# Patient Record
Sex: FEMALE | Race: ASIAN | NOT HISPANIC OR LATINO | Employment: UNEMPLOYED | URBAN - METROPOLITAN AREA
[De-identification: names, ages, dates, MRNs, and addresses within clinical notes are randomized per-mention and may not be internally consistent; named-entity substitution may affect disease eponyms.]

---

## 2017-01-05 ENCOUNTER — HOSPITAL ENCOUNTER (EMERGENCY)
Facility: CLINIC | Age: 32
Discharge: HOME | End: 2017-01-05
Admitting: EMERGENCY MEDICINE
Payer: COMMERCIAL

## 2017-01-05 VITALS
OXYGEN SATURATION: 98 % | SYSTOLIC BLOOD PRESSURE: 133 MMHG | TEMPERATURE: 97.9 F | RESPIRATION RATE: 16 BRPM | HEART RATE: 89 BPM | DIASTOLIC BLOOD PRESSURE: 88 MMHG

## 2017-01-05 PROCEDURE — 40000268 ZZH STATISTIC NO CHARGES

## 2017-01-05 NOTE — ED NOTES
Pt states she is feeling better and doesn't wish to be seen. Pt left with her family. Will return if needed.

## 2017-03-24 ENCOUNTER — TELEPHONE (OUTPATIENT)
Dept: ALLERGY | Facility: CLINIC | Age: 32
End: 2017-03-24

## 2017-03-24 DIAGNOSIS — Z51.6 NEED FOR DESENSITIZATION TO ALLERGENS: ICD-10-CM

## 2017-03-24 RX ORDER — EPINEPHRINE 0.3 MG/.3ML
0.3 INJECTION SUBCUTANEOUS
Qty: 0.6 ML | Refills: 0 | Status: SHIPPED | OUTPATIENT
Start: 2017-03-24 | End: 2017-05-11

## 2017-03-24 NOTE — TELEPHONE ENCOUNTER
Patient came for her first allergy injections today without an epipen.  Patient was told she could go home, get her epipen and come back for shots.   Patient came back again frustrated because she could not find her epipens.  Explained how unfortunately we cannot give shots without it per policy.  Patient states understanding.  Requests that I send a refill to her pharmacy.  Coupon card given and Rx sent per [facility] refill policy.  Shirley Grant RN

## 2017-04-20 ENCOUNTER — OFFICE VISIT (OUTPATIENT)
Dept: FAMILY MEDICINE | Facility: CLINIC | Age: 32
End: 2017-04-20
Payer: COMMERCIAL

## 2017-04-20 VITALS
SYSTOLIC BLOOD PRESSURE: 111 MMHG | WEIGHT: 192 LBS | HEART RATE: 101 BPM | HEIGHT: 66 IN | BODY MASS INDEX: 30.86 KG/M2 | TEMPERATURE: 97.6 F | DIASTOLIC BLOOD PRESSURE: 78 MMHG

## 2017-04-20 DIAGNOSIS — J30.2 SEASONAL ALLERGIC RHINITIS, UNSPECIFIED ALLERGIC RHINITIS TRIGGER: ICD-10-CM

## 2017-04-20 DIAGNOSIS — H61.23 EXCESSIVE CERUMEN IN BOTH EAR CANALS: Primary | ICD-10-CM

## 2017-04-20 PROCEDURE — 99212 OFFICE O/P EST SF 10 MIN: CPT | Mod: 25 | Performed by: FAMILY MEDICINE

## 2017-04-20 PROCEDURE — 69210 REMOVE IMPACTED EAR WAX UNI: CPT | Mod: 50 | Performed by: FAMILY MEDICINE

## 2017-04-20 RX ORDER — FLUTICASONE PROPIONATE 50 MCG
1-2 SPRAY, SUSPENSION (ML) NASAL DAILY
Qty: 16 G | Refills: 11 | COMMUNITY
Start: 2017-04-20 | End: 2017-12-22

## 2017-04-20 NOTE — NURSING NOTE
"Chief Complaint   Patient presents with     Cerumen Impaction     Both Ears       Initial /78 (BP Location: Left arm, Patient Position: Chair, Cuff Size: Adult Regular)  Pulse 101  Temp 97.6  F (36.4  C) (Tympanic)  Ht 5' 5.5\" (1.664 m)  Wt 192 lb (87.1 kg)  BMI 31.46 kg/m2 Estimated body mass index is 31.46 kg/(m^2) as calculated from the following:    Height as of this encounter: 5' 5.5\" (1.664 m).    Weight as of this encounter: 192 lb (87.1 kg).  Medication Reconciliation: complete    "

## 2017-04-20 NOTE — PATIENT INSTRUCTIONS
Thank you for choosing [facility] (formerly [facility]).  You may be receiving a survey in the mail from [employer] regarding your visit today.  Please take a few minutes to complete and return the survey to let us know how we are doing.      If you have questions or concerns, please contact us via J[employer]in[employer] or you can contact your care team at [phone].    Our Clinic hours are:  Monday 6:40 am  to 7:00 pm  Tuesday -Friday 6:40 am to 5:00 pm    The [place] outpatient lab hours are:  Monday - Friday 6:10 am to 4:45 pm  Saturdays 7:00 am to 11:00 am  Appointments are required, call [phone]    If you have clinical questions after hours or would like to schedule an appointment,  call the clinic at [phone].

## 2017-04-20 NOTE — MR AVS SNAPSHOT
After Visit Summary   4/20/2017    Maddy Ortiz    MRN: [id number]           Patient Information     Date Of Birth          1985        Visit Information        Provider Department      4/20/2017 9:40 AM Shaun Ng MD [facility]        Today's Diagnoses     Excessive cerumen in both ear canals    -  1    Seasonal allergic rhinitis, unspecified allergic rhinitis trigger          Care Instructions          Thank you for choosing [facility].  You may be receiving a survey in the mail from [employer] [employer] regarding your visit today.  Please take a few minutes to complete and return the survey to let us know how we are doing.      If you have questions or concerns, please contact us via Trellie or you can contact your care team at [phone].    Our Clinic hours are:  Monday 6:40 am  to 7:00 pm  Tuesday -Friday 6:40 am to 5:00 pm    The [place] outpatient lab hours are:  Monday - Friday 6:10 am to 4:45 pm  Saturdays 7:00 am to 11:00 am  Appointments are required, call [phone]    If you have clinical questions after hours or would like to schedule an appointment,  call the clinic at [phone].          Follow-ups after your visit        Follow-up notes from your care team     Return if symptoms worsen or fail to improve.      Who to contact     If you have questions or need follow up information about today's clinic visit or your schedule please contact [facility] directly at [phone].  Normal or non-critical lab and imaging results will be communicated to you by MyChart, letter or phone within 4 business days after the clinic has received the results. If you do not hear from us within 7 days, please contact the clinic through Kaiimat or phone. If you have a critical or abnormal lab result, we will notify you by phone as soon as possible.  Submit refill requests through Trellie or call your pharmacy and they will forward the refill request to  "us. Please allow 3 business days for your refill to be completed.          Additional Information About Your Visit        MyChart Information     [employer] lets you send messages to your doctor, view your test results, renew your prescriptions, schedule appointments and more. To sign up, go to [email or web address] . Click on \"Log in\" on the left side of the screen, which will take you to the Welcome page. Then click on \"Sign up Now\" on the right side of the page.     You will be asked to enter the access code listed below, as well as some personal information. Please follow the directions to create your username and password.     Your access code is: [email or web address]  Expires: 2017 10:37 AM     Your access code will  in 90 days. If you need help or a new code, please call your [place] clinic or [phone].        Care EveryWhere ID     This is your [facility] EveryWhere ID. This could be used by other organizations to access your [place] medical records  [id number]        Your Vitals Were     Pulse Temperature Height BMI (Body Mass Index)          101 97.6  F (36.4  C) (Tympanic) 5' 5.5\" (1.664 m) 31.46 kg/m2         Blood Pressure from Last 3 Encounters:   17 111/78   17 133/88   16 125/87    Weight from Last 3 Encounters:   17 192 lb (87.1 kg)   16 180 lb 9.6 oz (81.9 kg)   16 169 lb (76.7 kg)              Today, you had the following     No orders found for display       Primary Care Provider Office Phone #    [facility] [phone] [address] [address]        Thank you!     Thank you for choosing [facility]  for your care. Our goal is always to provide you with excellent care. Hearing back from our patients is one way we can continue to improve our services. Please take a few minutes to complete the written survey that you may receive in the mail after your visit with us. Thank you!             Your " Updated Medication List - Protect others around you: Learn how to safely use, store and throw away your medicines at www.disposemymeds.org.          This list is accurate as of: 4/20/17 10:37 AM.  Always use your most recent med list.                   Brand Name Dispense Instructions for use    * ALLERGEN IMMUNOTHERAPY PRESCRIPTION     5 mL    Cat Hair, Standardized 10,000 BAU/mL, ALK  3.0 ml Dog Hair Dander, A. P.  1:100 w/v, HS  1.0 ml Dust Mites F 30,000AU/mL, HS  0.5 ml Dust Mites P. 30,000 AU/mL, HS  0.5 ml  Diluent: HSA qs to 5ml       * ALLERGEN IMMUNOTHERAPY PRESCRIPTION     5 mL    Alternaria Tenuis GLY 1:10 w/v, HS  0.5 ml Epicoccum Nigrum 1:10 w/v, HS 0.5 ml Hormodendrum Cladosporioides 1:10 w/v, HS 0.5 ml Diluent: HSA qs to 5ml       * ALLERGEN IMMUNOTHERAPY PRESCRIPTION     5 mL    Estuardo, White  GLY 1:20 w/v, HS  0.5 ml Birch Mix GLY 1:20 w/v, HS  0.5 ml Elm, American GLY 1:20 w/v, HS  0.5 ml Hackberry GLY 1:20 w/v, HS 0.5 ml [place], Shagbark GLY 1:20 w/v, HS  0.5 ml [place] Mix GLY 1:20 w/v, HS 0.5 ml Oak Mix RVW GLY 1:20 w/v, HS 0.5 ml [place] Tree, Black GLY 1:20 w/v, HS 0.5 ml [place], Black GLY 1:20 w/v, HS 0.5 ml Diluent: HSA qs to 5ml       * ALLERGEN IMMUNOTHERAPY PRESCRIPTION     5 mL    Kochia GLY 1:20 w/v, HS 1.0 ml Nettle GLY 1:20 w/v, HS 1.0 ml Plantain, English GLY 1:20 w/v, HS 1.0 ml Ragweed Mixed 1:20 w/v ALK  0.6 ml Russian Thistle GLY 1:20 w/v, HS 1.0 ml Diluent: HSA qs to 5ml       EPINEPHrine 0.3 MG/0.3ML injection    EPIPEN 2-ODETTE    0.6 mL    Inject 0.3 mLs (0.3 mg) into the muscle once as needed for anaphylaxis       FLONASE 50 MCG/ACT spray   Generic drug:  fluticasone     16 g    Spray 1-2 sprays into both nostrils daily       lamoTRIgine 200 MG tablet    LaMICtal     Take 200 mg by mouth every morning       LORazepam 2 MG tablet    ATIVAN     Take 2 mg by mouth every 6 hours as needed for anxiety Reported on 4/20/2017       ziprasidone 40 MG capsule    GEODON     Take 120 mg by  mouth At Bedtime       * Notice:  This list has 4 medication(s) that are the same as other medications prescribed for you. Read the directions carefully, and ask your doctor or other care provider to review them with you.

## 2017-04-20 NOTE — PROGRESS NOTES
SUBJECTIVE:                                                    Maddy Ortiz is a 31 year old female who presents to clinic today for the following health issues:        Concern - Ears Feel Plugged      Onset: 2 weeks     Description:   Ears Feel Plugged, Wax impaction per patient .     Intensity: severe    Progression of Symptoms:  worsening    Accompanying Signs & Symptoms:  Pressure in Ears    Denies hearing impairment either ear, nasal congestion, ear discharge, sore throat, fever, dizziness.     Previous history of similar problem:   Has had issues with was impaction in the past     Precipitating factors:   Worsened by: none     Alleviating factors:  Improved by: none        Therapies Tried and outcome: None     Patient then states she has seasonal allergies, and has been having mild runny nose the last 2 weeks.  Patient denies sinus pressure, sore throat or nasal pain.  Does not take antihistamine or use nasal sprays.  Allergen immunotherapy is listed in her meds.      Problem list and histories reviewed & adjusted, as indicated.  Additional history: as documented    Patient Active Problem List   Diagnosis     Seasonal allergic rhinitis     Animal dander allergy     CARDIOVASCULAR SCREENING; LDL GOAL LESS THAN 160     Contraception     Psychosis     Paranoid type delusional disorder (H)     Bipolar 1 disorder (H)     Encounter for IUD removal     Insomnia     Anxiety     Health Care Home     Orthostatic hypotension     Depression with anxiety     Seasonal allergic rhinitis due to pollen     Allergic rhinitis due to mold     Allergic rhinitis due to animal dander     Allergic rhinitis due to dust mite     Past Surgical History:   Procedure Laterality Date     TONSILLECTOMY & ADENOIDECTOMY      as a child       Social History   Substance Use Topics     Smoking status: Never Smoker     Smokeless tobacco: Never Used     Alcohol use Yes      Comment: rare wine ( very rare)     Family History   Problem Relation  Age of Onset     Adopted: Yes     Unknown/Adopted Mother      Unknown/Adopted Father      Unknown/Adopted Other          Current Outpatient Prescriptions   Medication Sig Dispense Refill     fluticasone (FLONASE) 50 MCG/ACT spray Spray 1-2 sprays into both nostrils daily 16 g 11     EPINEPHrine (EPIPEN 2-ODETTE) 0.3 MG/0.3ML injection Inject 0.3 mLs (0.3 mg) into the muscle once as needed for anaphylaxis 0.6 mL 0     ziprasidone (GEODON) 40 MG capsule Take 120 mg by mouth At Bedtime   5     lamoTRIgine (LAMICTAL) 200 MG tablet Take 200 mg by mouth every morning   0     ORDER FOR ALLERGEN IMMUNOTHERAPY Cat Hair, Standardized 10,000 BAU/mL, ALK  3.0 ml  Dog Hair Dander, A. P.  1:100 w/v, HS  1.0 ml  Dust Mites F 30,000AU/mL, HS  0.5 ml  Dust Mites P. 30,000 AU/mL, HS  0.5 ml   Diluent: HSA qs to 5ml (Patient not taking: Reported on 4/20/2017) 5 mL PRN     ORDER FOR ALLERGEN IMMUNOTHERAPY Alternaria Tenuis GLY 1:10 w/v, HS  0.5 ml  Epicoccum Nigrum 1:10 w/v, HS 0.5 ml  Hormodendrum Cladosporioides 1:10 w/v, HS 0.5 ml  Diluent: HSA qs to 5ml (Patient not taking: Reported on 4/20/2017) 5 mL PRN     ORDER FOR ALLERGEN IMMUNOTHERAPY Estuardo, White  GLY 1:20 w/v, HS  0.5 ml  Birch Mix GLY 1:20 w/v, HS  0.5 ml  Elm, American GLY 1:20 w/v, HS  0.5 ml  Hackberry GLY 1:20 w/v, HS 0.5 ml  [place], Shagbark GLY 1:20 w/v, HS  0.5 ml  [place] Mix GLY 1:20 w/v, HS 0.5 ml  Oak Mix RVW GLY 1:20 w/v, HS 0.5 ml  [place] Tree, Black GLY 1:20 w/v, HS 0.5 ml  [place], Black GLY 1:20 w/v, HS 0.5 ml  Diluent: HSA qs to 5ml (Patient not taking: Reported on 4/20/2017) 5 mL PRN     ORDER FOR ALLERGEN IMMUNOTHERAPY Kochia GLY 1:20 w/v, HS 1.0 ml  Nettle GLY 1:20 w/v, HS 1.0 ml  Plantain, English GLY 1:20 w/v, HS 1.0 ml  Ragweed Mixed 1:20 w/v ALK  0.6 ml  Russian Thistle GLY 1:20 w/v, HS 1.0 ml  Diluent: HSA qs to 5ml 5 mL PRN     LORazepam (ATIVAN) 2 MG tablet Take 2 mg by mouth every 6 hours as needed for anxiety Reported on 4/20/2017       Allergies  "  Allergen Reactions     Animal Dander      Nkda [No Known Drug Allergies]      Seasonal Allergies        ROS:  C: NEGATIVE for fever, chills, change in weight  I: NEGATIVE for worrisome rashes, moles or lesions  E: NEGATIVE for vision changes or irritation  ENT/MOUTH: see above  RESP:as above  CV: NEGATIVE for chest pain, palpitations or peripheral edema  GI: NEGATIVE for nausea, abdominal pain, heartburn, or change in bowel habits  M: NEGATIVE for significant arthralgias or myalgia    OBJECTIVE:                                                    /78 (BP Location: Left arm, Patient Position: Chair, Cuff Size: Adult Regular)  Pulse 101  Temp 97.6  F (36.4  C) (Tympanic)  Ht 5' 5.5\" (1.664 m)  Wt 192 lb (87.1 kg)  BMI 31.46 kg/m2  Body mass index is 31.46 kg/(m^2).  GENERAL: alert and no distress  EYES: Eyes grossly normal to inspection, extraocular movements - intact, and PERRL  HENT: Ears - EAM with moderate amt of cerumen too far in to remove by curette, tympanic membrane intact and nonerythematous bilaterally but with clear effusion bilaterally; Nose - pale swollen turbinates, small amt of clcear nasal mucus prsent, no sinus tenderness bilaterally; throat nonerythematous  NECK: nontender anterior cervical lymphadenopathy present.  RESP: lungs clear to auscultation - no rales, no rhonchi, no wheezes  CV: regular rates and rhythm, normal S1 S2, no S3 or S4 and no murmur  SKIN:no rashes    Diagnostic test results:  Diagnostic Test Results:  none      ASSESSMENT/PLAN:                                                        ICD-10-CM    1. Excessive cerumen in both ear canals H61.23 After patient verbally consented to remove cerumen, started with left ear, EAM exposed by gently pulling lobe posteriorly.  Directly visualized cerumen in EAM on either ear.  Partial cerumen removal with plastic ear curette achieved but more cerumen too far in for curette removal. Aural irrigation receommended and patient agreed. " Mar Rondon CMA performed successful aural irrigation using warm tap water to both ears.. Tympanic membranes visualized intact.  Patient tolerated procedure well.  Post procedure instructions given.  Ear care discussed.  Return precautions discussed and given to patient.     2. Seasonal allergic rhinitis, unspecified allergic rhinitis trigger J30.2 fluticasone (FLONASE) 50 MCG/ACT spray  Advised patient this condition may result in increased inner and middle ear pressure and effusion.  Flonase use discussed.  Return precautions discussed and given to patient.         Follow up with Provider - prn   Patient Instructions         Thank you for choosing [facility] ([facility]  You may be receiving a survey in the mail from [employer] regarding your visit today.  Please take a few minutes to complete and return the survey to let us know how we are doing.      If you have questions or concerns, please contact us via [employer] or you can contact your care team at [phone].    Our Clinic hours are:  Monday 6:40 am  to 7:00 pm  Tuesday -Friday 6:40 am to 5:00 pm    The [place] outpatient lab hours are:  Monday - Friday 6:10 am to 4:45 pm  Saturdays 7:00 am to 11:00 am  Appointments are required, call [phone]    If you have clinical questions after hours or would like to schedule an appointment,  call the clinic at [phone].        Shaun Ng MD  [facility]

## 2017-05-01 ENCOUNTER — TELEPHONE (OUTPATIENT)
Dept: ALLERGY | Facility: CLINIC | Age: 32
End: 2017-05-01

## 2017-05-01 NOTE — TELEPHONE ENCOUNTER
Patient was seen by Dr. Blevins on 11/14/16.  Has not come in for any injections and green vials will be expiring on 5/8/17 so put in the serum expiring section of the fridge.    Rita Bai CMA

## 2017-05-11 ENCOUNTER — TELEPHONE (OUTPATIENT)
Dept: ALLERGY | Facility: CLINIC | Age: 32
End: 2017-05-11

## 2017-05-11 DIAGNOSIS — J30.1 SEASONAL ALLERGIC RHINITIS DUE TO POLLEN: ICD-10-CM

## 2017-05-11 DIAGNOSIS — J30.89 ALLERGIC RHINITIS DUE TO MOLD: ICD-10-CM

## 2017-05-11 DIAGNOSIS — J30.89 ALLERGIC RHINITIS DUE TO DUST MITE: ICD-10-CM

## 2017-05-11 DIAGNOSIS — J30.81 ALLERGIC RHINITIS DUE TO ANIMAL DANDER: ICD-10-CM

## 2017-05-11 DIAGNOSIS — Z51.6 NEED FOR DESENSITIZATION TO ALLERGENS: ICD-10-CM

## 2017-05-11 RX ORDER — EPINEPHRINE 0.3 MG/.3ML
0.3 INJECTION SUBCUTANEOUS
Qty: 0.6 ML | Refills: 3 | Status: SHIPPED | OUTPATIENT
Start: 2017-05-11 | End: 2018-05-23

## 2017-05-11 NOTE — TELEPHONE ENCOUNTER
ALLERGY SOLUTION RE-ORDER REQUEST    Maddy Ortiz 1985 MRN: [id number]    Patient never came in for allergy injections and green vials have .  Patient wants to start injections now.    DATE NEEDED:  ASAP  Vial Color Content   Top Dose   Last Dose     Vial Size  Green 1:1,000 Weeds   Red 1:1 0.5   Has not received any injections yet  5ml  Green 1:1,000 Molds   Red 1:1 0.5   Has not received any injections yet  5ml  Green 1:1,000 Cat, Dog, Dust Mite   Red 1:1 0.5   Has not received any injections yet  5ml  Green 1:1,000 Trees   Red 1:1 0.5   Has not received any injections yet  5ml      Shot Clinic Location:  [place]  Ship to Location: [place]  Special Instructions:  I will be sending patients blue vials to the compound pharmacy so they can do a mix down for the green vials.    PLEASE CALL PATIENT WHEN SERUMS ARRIVE.      Updated Prescription Needed: No      Requester Signature  Rita Bai

## 2017-05-11 NOTE — TELEPHONE ENCOUNTER
Patient is on the schedule 17 and patient hasn't come in for any allergy injections and green vials have .  She saw Dr. Blevins on 16 and serums were ordered but she never came in.  We would need to get a verbal ok from patient for a mix down to get green vials again.  I spoke with patient and she gave me and Juju the ok to send vials in for a mix down and get green vials again so she can start immunotherapy.  I will send patients blue vials down to the compound pharmacy to get green vials.      Rita Bai, CMA

## 2017-05-11 NOTE — TELEPHONE ENCOUNTER
RN refilled medication per [facility] Refill Protocol.     Omayra Martínez RN

## 2017-05-16 DIAGNOSIS — J30.1 SEASONAL ALLERGIC RHINITIS DUE TO POLLEN: Primary | ICD-10-CM

## 2017-05-16 PROCEDURE — 95165 ANTIGEN THERAPY SERVICES: CPT | Performed by: ALLERGY & IMMUNOLOGY

## 2017-05-16 NOTE — TELEPHONE ENCOUNTER
Allergy serums received at [place].     Vials received below:    Vial Color Content                      Vial Size Expiration Date  Green 1:1,000 Cat, Dog, Dust Mite 5mL  11/8/17  Green 1:1,000 Weeds 5mL  11/8/17  Green 1:1,000 Trees 5mL  11/8/17  Green 1:1,000 Molds 5mL  11/8/17    Left message to notify patient that serums arrived in clinic.      Signature  Sherrie Henao

## 2017-05-16 NOTE — PROGRESS NOTES
Allergy serums billed at [place].     Vials billed below:    Vial Color Content                      Vial Size Expiration Date  Green 1:1,000 Cat, Dog, Dust Mite 5mL  11/8/17  Green 1:1,000 Weeds 5mL  11/8/17  Green 1:1,000 Trees 5mL  11/8/17  Green 1:1,000 Molds 5mL  11/8/17      Original Refill encounter date: 5/1/17    **Left message to notify patient serums received in clinic*      Signature  Sherrie Henao

## 2017-05-16 NOTE — TELEPHONE ENCOUNTER
Pt returned call. Informed of serum arrival. Pt will call back to make appointment.   No further questions.   Callie VELASQUEZ RN  [facility]

## 2017-05-18 ENCOUNTER — OFFICE VISIT (OUTPATIENT)
Dept: FAMILY MEDICINE | Facility: CLINIC | Age: 32
End: 2017-05-18
Payer: COMMERCIAL

## 2017-05-18 VITALS
SYSTOLIC BLOOD PRESSURE: 121 MMHG | BODY MASS INDEX: 30.97 KG/M2 | TEMPERATURE: 99.2 F | DIASTOLIC BLOOD PRESSURE: 81 MMHG | WEIGHT: 189 LBS

## 2017-05-18 DIAGNOSIS — R07.0 THROAT PAIN: ICD-10-CM

## 2017-05-18 DIAGNOSIS — J06.9 VIRAL URI: Primary | ICD-10-CM

## 2017-05-18 LAB
DEPRECATED S PYO AG THROAT QL EIA: NORMAL
MICRO REPORT STATUS: NORMAL
SPECIMEN SOURCE: NORMAL

## 2017-05-18 PROCEDURE — 87880 STREP A ASSAY W/OPTIC: CPT | Performed by: NURSE PRACTITIONER

## 2017-05-18 PROCEDURE — 99213 OFFICE O/P EST LOW 20 MIN: CPT | Performed by: NURSE PRACTITIONER

## 2017-05-18 PROCEDURE — 87081 CULTURE SCREEN ONLY: CPT | Performed by: NURSE PRACTITIONER

## 2017-05-18 NOTE — PROGRESS NOTES
SUBJECTIVE:                                                    Maddy Ortiz is a 31 year old female who presents to clinic today for the following health issues:      ENT Symptoms             Symptoms: cc Present Absent Comment   Fever/Chills  x     Fatigue  x     Muscle Aches   x    Eye Irritation   x    Sneezing   x    Nasal Peewee/Drg   x    Sinus Pressure/Pain   x    Loss of smell   x    Dental pain   x    Sore Throat  x     Swollen Glands  x     Ear Pain/Fullness   x    Cough   x    Wheeze   x    Chest Pain   x    Shortness of breath   x    Rash   x    Other   x      Symptom duration:  4 days   Symptom severity:  moderate   Treatments tried:  none   Contacts:  daughter with strep         Problem list and histories reviewed & adjusted, as indicated.  Additional history: as documented    Reviewed and updated as needed this visit by clinical staff  Meds       Reviewed and updated as needed this visit by Provider         ROS:  Constitutional, HEENT, cardiovascular, pulmonary, gi and gu systems are negative, except as otherwise noted.    OBJECTIVE:                                                    /81 (BP Location: Right arm, Patient Position: Chair, Cuff Size: Adult Large)  Temp 99.2  F (37.3  C) (Tympanic)  Wt 189 lb (85.7 kg)  BMI 30.97 kg/m2  Body mass index is 30.97 kg/(m^2).  GENERAL: healthy, alert and no distress  HENT: ear canals and TM's normal, nose and mouth without ulcers or lesions  NECK: no adenopathy, no asymmetry, masses, or scars and thyroid normal to palpation  RESP: lungs clear to auscultation - no rales, rhonchi or wheezes  CV: regular rate and rhythm, normal S1 S2, no S3 or S4, no murmur, click or rub, no peripheral edema and peripheral pulses strong    Diagnostic Test Results:  Results for orders placed or performed in visit on 05/18/17 (from the past 24 hour(s))   Rapid strep screen   Result Value Ref Range    Specimen Description Throat     Rapid Strep A Screen       NEGATIVE: No  Group A streptococcal antigen detected by immunoassay, await   culture report.      Micro Report Status FINAL 05/18/2017         ASSESSMENT/PLAN:                                                          ICD-10-CM    1. Viral URI J06.9     B97.89    2. Throat pain R07.0 Rapid strep screen     Beta strep group A culture     1. We will call you and let you know the results of your strep culture.   2. May use tylenol 1000 mg every 8 hours or ibuprofen 600 mg every 6 hours for throat discomfort as needed.   3. May use throat lozenges for comfort as needed.   4. Warm salt water gargles may also help with the discomfort.      The risks, benefits and treatment options of prescribed medications or other treatments have been discussed with the patient. The patient verbalized their understanding and should call or follow up if no improvement or if they develop further problems.    PHILL Yan [facility]

## 2017-05-18 NOTE — MR AVS SNAPSHOT
"              After Visit Summary   5/18/2017    Maddy Ortiz    MRN: [id number]           Patient Information     Date Of Birth          1985        Visit Information        Provider Department      5/18/2017 2:20 PM Shirley Freeman APRN CNP [facility]        Today's Diagnoses     Viral URI    -  1    Throat pain           Follow-ups after your visit        Your next 10 appointments already scheduled     May 19, 2017 10:45 AM CDT   Nurse Only with ALLERGY [facility] ([facility])    [address] [address]   [phone]           Every allergy patient MUST wait 30 minutes after their allergy shot. No exceptions.  Xolair shots #1-3 should plan to wait 2 hours in clinic Xolair shots after #4 should plan 30 minute wait in clinic              Who to contact     If you have questions or need follow up information about today's clinic visit or your schedule please contact [facility] directly at [phone].  Normal or non-critical lab and imaging results will be communicated to you by Oja.lahart, letter or phone within 4 business days after the clinic has received the results. If you do not hear from us within 7 days, please contact the clinic through PetCoacht or phone. If you have a critical or abnormal lab result, we will notify you by phone as soon as possible.  Submit refill requests through Engage or call your pharmacy and they will forward the refill request to us. Please allow 3 business days for your refill to be completed.          Additional Information About Your Visit        Oja.lahart Information     Engage lets you send messages to your doctor, view your test results, renew your prescriptions, schedule appointments and more. To sign up, go to [email or web address] . Click on \"Log in\" on the left side of the screen, which will take you to the Welcome page. Then click on \"Sign up Now\" on the right " side of the page.     You will be asked to enter the access code listed below, as well as some personal information. Please follow the directions to create your username and password.     Your access code is: [email or web address]  Expires: 2017 10:37 AM     Your access code will  in 90 days. If you need help or a new code, please call your [place] clinic or [phone].        Care EveryWhere ID     This is your Care EveryWhere ID. This could be used by other organizations to access your [place] medical records  [id number]        Your Vitals Were     Temperature BMI (Body Mass Index)                99.2  F (37.3  C) (Tympanic) 30.97 kg/m2           Blood Pressure from Last 3 Encounters:   17 121/81   17 111/78   17 133/88    Weight from Last 3 Encounters:   17 189 lb (85.7 kg)   17 192 lb (87.1 kg)   16 180 lb 9.6 oz (81.9 kg)              We Performed the Following     Beta strep group A culture     Rapid strep screen        Primary Care Provider Office Phone #    [facility] [phone] [address] [address]        Thank you!     Thank you for choosing [facility]  for your care. Our goal is always to provide you with excellent care. Hearing back from our patients is one way we can continue to improve our services. Please take a few minutes to complete the written survey that you may receive in the mail after your visit with us. Thank you!             Your Updated Medication List - Protect others around you: Learn how to safely use, store and throw away your medicines at www.disposemymeds.org.          This list is accurate as of: 17  2:37 PM.  Always use your most recent med list.                   Brand Name Dispense Instructions for use    * ALLERGEN IMMUNOTHERAPY PRESCRIPTION     5 mL    Cat Hair, Standardized 10,000 BAU/mL, ALK  3.0 ml Dog Hair Dander, A. P.  1:100 w/v, HS  1.0 ml Dust Mites F  30,000AU/mL, HS  0.5 ml Dust Mites P. 30,000 AU/mL, HS  0.5 ml  Diluent: HSA qs to 5ml       * ALLERGEN IMMUNOTHERAPY PRESCRIPTION     5 mL    Alternaria Tenuis GLY 1:10 w/v, HS  0.5 ml Epicoccum Nigrum 1:10 w/v, HS 0.5 ml Hormodendrum Cladosporioides 1:10 w/v, HS 0.5 ml Diluent: HSA qs to 5ml       * ALLERGEN IMMUNOTHERAPY PRESCRIPTION     5 mL    Estuardo, White  GLY 1:20 w/v, HS  0.5 ml Birch Mix GLY 1:20 w/v, HS  0.5 ml Elm, American GLY 1:20 w/v, HS  0.5 ml Hackberry GLY 1:20 w/v, HS 0.5 ml Hickory, Shagbark GLY 1:20 w/v, HS  0.5 ml [place]t [place] Mix GLY 1:20 w/v, HS 0.5 ml Oak Mix RVW GLY 1:20 w/v, HS 0.5 ml [place] Tree, Black GLY 1:20 w/v, HS 0.5 ml [place], Black GLY 1:20 w/v, HS 0.5 ml Diluent: HSA qs to 5ml       * ALLERGEN IMMUNOTHERAPY PRESCRIPTION     5 mL    Kochia GLY 1:20 w/v, HS 1.0 ml Nettle GLY 1:20 w/v, HS 1.0 ml Plantain, English GLY 1:20 w/v, HS 1.0 ml Ragweed Mixed 1:20 w/v ALK  0.6 ml Russian Thistle GLY 1:20 w/v, HS 1.0 ml Diluent: HSA qs to 5ml       EPINEPHrine 0.3 MG/0.3ML injection    EPIPEN 2-ODETTE    0.6 mL    Inject 0.3 mLs (0.3 mg) into the muscle once as needed for anaphylaxis       FLONASE 50 MCG/ACT spray   Generic drug:  fluticasone     16 g    Spray 1-2 sprays into both nostrils daily       lamoTRIgine 200 MG tablet    LaMICtal     Take 200 mg by mouth every morning       LORazepam 2 MG tablet    ATIVAN     Take 2 mg by mouth every 6 hours as needed for anxiety Reported on 4/20/2017       ziprasidone 40 MG capsule    GEODON     Take 120 mg by mouth At Bedtime       * Notice:  This list has 4 medication(s) that are the same as other medications prescribed for you. Read the directions carefully, and ask your doctor or other care provider to review them with you.

## 2017-05-18 NOTE — NURSING NOTE
"Initial /81 (BP Location: Right arm, Patient Position: Chair, Cuff Size: Adult Large)  Temp 99.2  F (37.3  C) (Tympanic)  Wt 189 lb (85.7 kg)  BMI 30.97 kg/m2 Estimated body mass index is 30.97 kg/(m^2) as calculated from the following:    Height as of 4/20/17: 5' 5.5\" (1.664 m).    Weight as of this encounter: 189 lb (85.7 kg). .    Patience Baca    "

## 2017-05-19 ENCOUNTER — ALLIED HEALTH/NURSE VISIT (OUTPATIENT)
Dept: ALLERGY | Facility: CLINIC | Age: 32
End: 2017-05-19
Payer: COMMERCIAL

## 2017-05-19 DIAGNOSIS — J30.9 ALLERGIC RHINITIS, UNSPECIFIED: Primary | ICD-10-CM

## 2017-05-19 LAB
BACTERIA SPEC CULT: NORMAL
MICRO REPORT STATUS: NORMAL
SPECIMEN SOURCE: NORMAL

## 2017-05-19 PROCEDURE — 95117 IMMUNOTHERAPY INJECTIONS: CPT

## 2017-05-19 NOTE — MR AVS SNAPSHOT
"              After Visit Summary   2017    Maddy Ortiz    MRN: [id number]           Patient Information     Date Of Birth          1985        Visit Information        Provider Department      2017 10:45 AM ALLERGY [facility] - [facility]        Today's Diagnoses     Allergic rhinitis, unspecified    -  1       Follow-ups after your visit        Who to contact     If you have questions or need follow up information about today's clinic visit or your schedule please contact [facility] directly at [phone].  Normal or non-critical lab and imaging results will be communicated to you by Samplify Systemshart, letter or phone within 4 business days after the clinic has received the results. If you do not hear from us within 7 days, please contact the clinic through Samplify Systemshart or phone. If you have a critical or abnormal lab result, we will notify you by phone as soon as possible.  Submit refill requests through Kairos or call your pharmacy and they will forward the refill request to us. Please allow 3 business days for your refill to be completed.          Additional Information About Your Visit        MyChart Information     Kairos lets you send messages to your doctor, view your test results, renew your prescriptions, schedule appointments and more. To sign up, go to [email or web address] . Click on \"Log in\" on the left side of the screen, which will take you to the Welcome page. Then click on \"Sign up Now\" on the right side of the page.     You will be asked to enter the access code listed below, as well as some personal information. Please follow the directions to create your username and password.     Your access code is: [email or web address]  Expires: 2017 10:37 AM     Your access code will  in 90 days. If you need help or a new code, please call your [facility] or [phone].        Care EveryWhere ID     This is your Care EveryWhere ID. This could be used by other " organizations to access your [place] medical records  [id number]         Blood Pressure from Last 3 Encounters:   05/18/17 121/81   04/20/17 111/78   01/05/17 133/88    Weight from Last 3 Encounters:   05/18/17 189 lb (85.7 kg)   04/20/17 192 lb (87.1 kg)   11/04/16 180 lb 9.6 oz (81.9 kg)              We Performed the Following     Allergy Shot: Two or more injections        Primary Care Provider Office Phone #    [facility] [phone] [address] [address]        Thank you!     Thank you for choosing [facility]  for your care. Our goal is always to provide you with excellent care. Hearing back from our patients is one way we can continue to improve our services. Please take a few minutes to complete the written survey that you may receive in the mail after your visit with us. Thank you!             Your Updated Medication List - Protect others around you: Learn how to safely use, store and throw away your medicines at www.disposemymeds.org.          This list is accurate as of: 5/19/17 11:10 AM.  Always use your most recent med list.                   Brand Name Dispense Instructions for use    * ALLERGEN IMMUNOTHERAPY PRESCRIPTION     5 mL    Cat Hair, Standardized 10,000 BAU/mL, ALK  3.0 ml Dog Hair Dander, A. P.  1:100 w/v, HS  1.0 ml Dust Mites F 30,000AU/mL, HS  0.5 ml Dust Mites P. 30,000 AU/mL, HS  0.5 ml  Diluent: HSA qs to 5ml       * ALLERGEN IMMUNOTHERAPY PRESCRIPTION     5 mL    Alternaria Tenuis GLY 1:10 w/v, HS  0.5 ml Epicoccum Nigrum 1:10 w/v, HS 0.5 ml Hormodendrum Cladosporioides 1:10 w/v, HS 0.5 ml Diluent: HSA qs to 5ml       * ALLERGEN IMMUNOTHERAPY PRESCRIPTION     5 mL    Estuardo, White  GLY 1:20 w/v, HS  0.5 ml Birch Mix GLY 1:20 w/v, HS  0.5 ml Elm, American GLY 1:20 w/v, HS  0.5 ml Hackberry GLY 1:20 w/v, HS 0.5 ml Hickory, Shagbark GLY 1:20 w/v, HS  0.5 ml [place] Mix GLY 1:20 w/v, HS 0.5 ml Oak Mix RVW GLY 1:20 w/v, HS  0.5 ml [place] Tree, Black GLY 1:20 w/v, HS 0.5 ml [place], Black GLY 1:20 w/v, HS 0.5 ml Diluent: HSA qs to 5ml       * ALLERGEN IMMUNOTHERAPY PRESCRIPTION     5 mL    Kochia GLY 1:20 w/v, HS 1.0 ml Nettle GLY 1:20 w/v, HS 1.0 ml Plantain, English GLY 1:20 w/v, HS 1.0 ml Ragweed Mixed 1:20 w/v ALK  0.6 ml Russian Thistle GLY 1:20 w/v, HS 1.0 ml Diluent: HSA qs to 5ml       EPINEPHrine 0.3 MG/0.3ML injection    EPIPEN 2-ODETTE    0.6 mL    Inject 0.3 mLs (0.3 mg) into the muscle once as needed for anaphylaxis       FLONASE 50 MCG/ACT spray   Generic drug:  fluticasone     16 g    Spray 1-2 sprays into both nostrils daily       lamoTRIgine 200 MG tablet    LaMICtal     Take 200 mg by mouth every morning       LORazepam 2 MG tablet    ATIVAN     Take 2 mg by mouth every 6 hours as needed for anxiety Reported on 4/20/2017       ziprasidone 40 MG capsule    GEODON     Take 120 mg by mouth At Bedtime       * Notice:  This list has 4 medication(s) that are the same as other medications prescribed for you. Read the directions carefully, and ask your doctor or other care provider to review them with you.

## 2017-05-24 ENCOUNTER — OFFICE VISIT (OUTPATIENT)
Dept: FAMILY MEDICINE | Facility: CLINIC | Age: 32
End: 2017-05-24
Payer: COMMERCIAL

## 2017-05-24 VITALS
DIASTOLIC BLOOD PRESSURE: 82 MMHG | BODY MASS INDEX: 30.81 KG/M2 | SYSTOLIC BLOOD PRESSURE: 132 MMHG | HEIGHT: 66 IN | HEART RATE: 99 BPM | WEIGHT: 191.7 LBS | TEMPERATURE: 97.9 F

## 2017-05-24 DIAGNOSIS — R82.90 NONSPECIFIC FINDING ON EXAMINATION OF URINE: ICD-10-CM

## 2017-05-24 DIAGNOSIS — N30.00 ACUTE CYSTITIS WITHOUT HEMATURIA: ICD-10-CM

## 2017-05-24 DIAGNOSIS — R30.0 DYSURIA: Primary | ICD-10-CM

## 2017-05-24 LAB
ALBUMIN UR-MCNC: NEGATIVE MG/DL
APPEARANCE UR: ABNORMAL
BACTERIA #/AREA URNS HPF: ABNORMAL /HPF
BILIRUB UR QL STRIP: NEGATIVE
COLOR UR AUTO: YELLOW
GLUCOSE UR STRIP-MCNC: NEGATIVE MG/DL
HGB UR QL STRIP: NEGATIVE
KETONES UR STRIP-MCNC: NEGATIVE MG/DL
LEUKOCYTE ESTERASE UR QL STRIP: NEGATIVE
NITRATE UR QL: POSITIVE
NON-SQ EPI CELLS #/AREA URNS LPF: ABNORMAL /LPF
PH UR STRIP: 5.5 PH (ref 5–7)
RBC #/AREA URNS AUTO: ABNORMAL /HPF (ref 0–2)
SP GR UR STRIP: 1.02 (ref 1–1.03)
URN SPEC COLLECT METH UR: ABNORMAL
UROBILINOGEN UR STRIP-ACNC: 0.2 EU/DL (ref 0.2–1)
WBC #/AREA URNS AUTO: ABNORMAL /HPF (ref 0–2)

## 2017-05-24 PROCEDURE — 81001 URINALYSIS AUTO W/SCOPE: CPT | Performed by: NURSE PRACTITIONER

## 2017-05-24 PROCEDURE — 99213 OFFICE O/P EST LOW 20 MIN: CPT | Performed by: NURSE PRACTITIONER

## 2017-05-24 PROCEDURE — 87086 URINE CULTURE/COLONY COUNT: CPT | Performed by: NURSE PRACTITIONER

## 2017-05-24 RX ORDER — NITROFURANTOIN 25; 75 MG/1; MG/1
100 CAPSULE ORAL 2 TIMES DAILY
Qty: 10 CAPSULE | Refills: 0 | Status: SHIPPED | OUTPATIENT
Start: 2017-05-24 | End: 2017-05-29

## 2017-05-24 NOTE — MR AVS SNAPSHOT
"              After Visit Summary   2017    Maddy Ortiz    MRN: [id number]           Patient Information     Date Of Birth          1985        Visit Information        Provider Department      2017 10:20 AM Mary Anne Tello APRN CNP [facility]        Today's Diagnoses     Dysuria    -  1    Nonspecific finding on examination of urine        Acute cystitis without hematuria          Care Instructions    Macrobid 1 tablet twice daily for 5 days  Drink more fluids              Follow-ups after your visit        Who to contact     If you have questions or need follow up information about today's clinic visit or your schedule please contact [facility] directly at [phone].  Normal or non-critical lab and imaging results will be communicated to you by MyChart, letter or phone within 4 business days after the clinic has received the results. If you do not hear from us within 7 days, please contact the clinic through MyChart or phone. If you have a critical or abnormal lab result, we will notify you by phone as soon as possible.  Submit refill requests through [employer] or call your pharmacy and they will forward the refill request to us. Please allow 3 business days for your refill to be completed.          Additional Information About Your Visit        MyChart Information     [employer] lets you send messages to your doctor, view your test results, renew your prescriptions, schedule appointments and more. To sign up, go to [email or web address] . Click on \"Log in\" on the left side of the screen, which will take you to the Welcome page. Then click on \"Sign up Now\" on the right side of the page.     You will be asked to enter the access code listed below, as well as some personal information. Please follow the directions to create your username and password.     Your access code is: [email or web address]  Expires: 2017 10:37 AM     Your access code will  in 90 days. If " "you need help or a new code, please call your [place] clinic or [phone].        Care EveryWhere ID     This is your Care EveryWhere ID. This could be used by other organizations to access your [place] medical records  [id number]        Your Vitals Were     Pulse Temperature Height BMI (Body Mass Index)          99 97.9  F (36.6  C) (Tympanic) 5' 5.5\" (1.664 m) 31.42 kg/m2         Blood Pressure from Last 3 Encounters:   05/24/17 132/82   05/18/17 121/81   04/20/17 111/78    Weight from Last 3 Encounters:   05/24/17 191 lb 11.2 oz (87 kg)   05/18/17 189 lb (85.7 kg)   04/20/17 192 lb (87.1 kg)              We Performed the Following     *UA reflex to Microscopic and Culture ([place] [facility] (except [place] and [place])     Urine Culture Aerobic Bacterial     Urine Microscopic          Today's Medication Changes          These changes are accurate as of: 5/24/17 10:46 AM.  If you have any questions, ask your nurse or doctor.               Start taking these medicines.        Dose/Directions    nitrofurantoin (macrocrystal-monohydrate) 100 MG capsule   Commonly known as:  MACROBID   Used for:  Acute cystitis without hematuria   Started by:  Mary Anne Tello APRN CNP        Dose:  100 mg   Take 1 capsule (100 mg) by mouth 2 times daily for 5 days   Quantity:  10 capsule   Refills:  0            Where to get your medicines      These medications were sent to [address] [address]     Phone:  [phone]     nitrofurantoin (macrocrystal-monohydrate) 100 MG capsule                Primary Care Provider Office Phone #    [facility] [phone] [address] [address]        Thank you!     Thank you for choosing [facility]  for your care. Our goal is always to provide you with excellent care. Hearing back from our patients is one way we can continue " to improve our services. Please take a few minutes to complete the written survey that you may receive in the mail after your visit with us. Thank you!             Your Updated Medication List - Protect others around you: Learn how to safely use, store and throw away your medicines at www.disposemymeds.org.          This list is accurate as of: 5/24/17 10:46 AM.  Always use your most recent med list.                   Brand Name Dispense Instructions for use    * ALLERGEN IMMUNOTHERAPY PRESCRIPTION     5 mL    Cat Hair, Standardized 10,000 BAU/mL, ALK  3.0 ml Dog Hair Dander, A. P.  1:100 w/v, HS  1.0 ml Dust Mites F 30,000AU/mL, HS  0.5 ml Dust Mites P. 30,000 AU/mL, HS  0.5 ml  Diluent: HSA qs to 5ml       * ALLERGEN IMMUNOTHERAPY PRESCRIPTION     5 mL    Alternaria Tenuis GLY 1:10 w/v, HS  0.5 ml Epicoccum Nigrum 1:10 w/v, HS 0.5 ml Hormodendrum Cladosporioides 1:10 w/v, HS 0.5 ml Diluent: HSA qs to 5ml       * ALLERGEN IMMUNOTHERAPY PRESCRIPTION     5 mL    Estuardo, White  GLY 1:20 w/v, HS  0.5 ml Birch Mix GLY 1:20 w/v, HS  0.5 ml Elm, American GLY 1:20 w/v, HS  0.5 ml Hackberry GLY 1:20 w/v, HS 0.5 ml Hickory, Shagbark GLY 1:20 w/v, HS  0.5 ml [place] Mix GLY 1:20 w/v, HS 0.5 ml Oak Mix RVW GLY 1:20 w/v, HS 0.5 ml [place] Tree, Black GLY 1:20 w/v, HS 0.5 ml [place], Black GLY 1:20 w/v, HS 0.5 ml Diluent: HSA qs to 5ml       * ALLERGEN IMMUNOTHERAPY PRESCRIPTION     5 mL    Kochia GLY 1:20 w/v, HS 1.0 ml Nettle GLY 1:20 w/v, HS 1.0 ml Plantain, English GLY 1:20 w/v, HS 1.0 ml Ragweed Mixed 1:20 w/v ALK  0.6 ml Russian Thistle GLY 1:20 w/v, HS 1.0 ml Diluent: HSA qs to 5ml       EPINEPHrine 0.3 MG/0.3ML injection    EPIPEN 2-ODETTE    0.6 mL    Inject 0.3 mLs (0.3 mg) into the muscle once as needed for anaphylaxis       FLONASE 50 MCG/ACT spray   Generic drug:  fluticasone     16 g    Spray 1-2 sprays into both nostrils daily       lamoTRIgine 200 MG tablet    LaMICtal     Take 200 mg by mouth every morning        LORazepam 2 MG tablet    ATIVAN     Take 2 mg by mouth every 6 hours as needed for anxiety Reported on 4/20/2017       nitrofurantoin (macrocrystal-monohydrate) 100 MG capsule    MACROBID    10 capsule    Take 1 capsule (100 mg) by mouth 2 times daily for 5 days       ziprasidone 40 MG capsule    GEODON     Take 120 mg by mouth At Bedtime       * Notice:  This list has 4 medication(s) that are the same as other medications prescribed for you. Read the directions carefully, and ask your doctor or other care provider to review them with you.

## 2017-05-24 NOTE — NURSING NOTE
"Chief Complaint   Patient presents with     UTI       Initial /82  Pulse 99  Temp 97.9  F (36.6  C) (Tympanic)  Ht 5' 5.5\" (1.664 m)  Wt 191 lb 11.2 oz (87 kg)  BMI 31.42 kg/m2 Estimated body mass index is 31.42 kg/(m^2) as calculated from the following:    Height as of this encounter: 5' 5.5\" (1.664 m).    Weight as of this encounter: 191 lb 11.2 oz (87 kg).  Medication Reconciliation: complete  "

## 2017-05-24 NOTE — PROGRESS NOTES
"  SUBJECTIVE:                                                    Maddy Ortiz is a 31 year old female who presents to clinic today for the following health issues:    URINARY TRACT SYMPTOMS     Onset: 1 day     Description:   Painful urination (Dysuria): no   Blood in urine (Hematuria): no   Delay in urine (Hesitency): YES    Intensity: moderate    Progression of Symptoms:  same    Accompanying Signs & Symptoms:  Fever/chills: no   Flank pain YES  Nausea and vomiting: no   Any vaginal symptoms: none  Abdominal/Pelvic Pain: YES   History:   History of frequent UTI's: YES  History of kidney stones: no   Sexually Active: YES  Possibility of pregnancy: No    Precipitating factors:   None        Therapies Tried and outcome: Azo    Problem list and histories reviewed & adjusted, as indicated.  Additional history: as documented    Labs reviewed in EPIC    Reviewed and updated as needed this visit by clinical staff  Tobacco  Allergies  Med Hx  Surg Hx  Fam Hx  Soc Hx      Reviewed and updated as needed this visit by Provider         ROS:  Constitutional, HEENT, cardiovascular, pulmonary, gi and gu systems are negative, except as otherwise noted.    OBJECTIVE:                                                    /82  Pulse 99  Temp 97.9  F (36.6  C) (Tympanic)  Ht 5' 5.5\" (1.664 m)  Wt 191 lb 11.2 oz (87 kg)  BMI 31.42 kg/m2  Body mass index is 31.42 kg/(m^2).  GENERAL: healthy, alert and no distress  ABDOMEN: soft, nontender, mild suprapubic discomfort   BACK: no CVA tenderness, no paralumbar tenderness    Diagnostic Test Results:  Urinalysis - positive for mild UTI  urine culture pending      ASSESSMENT/PLAN:                                                      1. Dysuria  - UA reflex to Microscopic and Culture ([place] [facility] (except [place] and [place])-positive for mild UTI  - Urine Microscopic    2. Nonspecific finding on examination of urine  - Urine Culture Aerobic Bacterial-pending "     3. Acute cystitis without hematuria  - nitrofurantoin, macrocrystal-monohydrate, (MACROBID) 100 MG capsule; Take 1 capsule (100 mg) by mouth 2 times daily for 5 days  Dispense: 10 capsule; Refill: 0  -drink more fluids     See Patient Instructions    PHILL Freed [facility]

## 2017-05-25 ASSESSMENT — PATIENT HEALTH QUESTIONNAIRE - PHQ9: SUM OF ALL RESPONSES TO PHQ QUESTIONS 1-9: 3

## 2017-05-26 LAB
BACTERIA SPEC CULT: NORMAL
MICRO REPORT STATUS: NORMAL
SPECIMEN SOURCE: NORMAL

## 2017-05-30 ENCOUNTER — OFFICE VISIT (OUTPATIENT)
Dept: FAMILY MEDICINE | Facility: CLINIC | Age: 32
End: 2017-05-30
Payer: COMMERCIAL

## 2017-05-30 VITALS
TEMPERATURE: 97.9 F | WEIGHT: 193.4 LBS | HEIGHT: 66 IN | BODY MASS INDEX: 31.08 KG/M2 | SYSTOLIC BLOOD PRESSURE: 129 MMHG | HEART RATE: 96 BPM | DIASTOLIC BLOOD PRESSURE: 83 MMHG | OXYGEN SATURATION: 95 %

## 2017-05-30 DIAGNOSIS — R39.15 URINARY URGENCY: Primary | ICD-10-CM

## 2017-05-30 DIAGNOSIS — R82.90 NONSPECIFIC FINDING ON EXAMINATION OF URINE: ICD-10-CM

## 2017-05-30 LAB
ALBUMIN UR-MCNC: NEGATIVE MG/DL
APPEARANCE UR: ABNORMAL
BILIRUB UR QL STRIP: NEGATIVE
COLOR UR AUTO: YELLOW
GLUCOSE UR STRIP-MCNC: NEGATIVE MG/DL
HGB UR QL STRIP: NEGATIVE
KETONES UR STRIP-MCNC: NEGATIVE MG/DL
LEUKOCYTE ESTERASE UR QL STRIP: NEGATIVE
NITRATE UR QL: POSITIVE
NON-SQ EPI CELLS #/AREA URNS LPF: NORMAL /LPF
PH UR STRIP: 6.5 PH (ref 5–7)
RBC #/AREA URNS AUTO: NORMAL /HPF (ref 0–2)
SP GR UR STRIP: 1.02 (ref 1–1.03)
URN SPEC COLLECT METH UR: ABNORMAL
UROBILINOGEN UR STRIP-ACNC: 1 EU/DL (ref 0.2–1)
WBC #/AREA URNS AUTO: NORMAL /HPF (ref 0–2)

## 2017-05-30 PROCEDURE — 81001 URINALYSIS AUTO W/SCOPE: CPT | Performed by: INTERNAL MEDICINE

## 2017-05-30 PROCEDURE — 99213 OFFICE O/P EST LOW 20 MIN: CPT | Performed by: INTERNAL MEDICINE

## 2017-05-30 PROCEDURE — 87086 URINE CULTURE/COLONY COUNT: CPT | Performed by: INTERNAL MEDICINE

## 2017-05-30 NOTE — NURSING NOTE
"Chief Complaint   Patient presents with     UTI     x 1.5 weeks, urgency       Initial /83 (BP Location: Left arm, Patient Position: Chair, Cuff Size: Adult Regular)  Pulse 96  Temp 97.9  F (36.6  C) (Tympanic)  Ht 5' 5.5\" (1.664 m)  Wt 193 lb 6.4 oz (87.7 kg)  SpO2 95%  BMI 31.69 kg/m2 Estimated body mass index is 31.69 kg/(m^2) as calculated from the following:    Height as of this encounter: 5' 5.5\" (1.664 m).    Weight as of this encounter: 193 lb 6.4 oz (87.7 kg).  Medication Reconciliation: complete   Belen TERRELL CMA ([facility])    "

## 2017-05-30 NOTE — PROGRESS NOTES
SUBJECTIVE:                                                    Maddy Ortiz is a 31 year old female who presents to clinic today for the following health issues:  Chief Complaint   Patient presents with     UTI     x 1.5 weeks, urgency     URINARY TRACT SYMPTOMS     Onset: x 1.5 weeks     Description:   Painful urination (Dysuria): no   Blood in urine (Hematuria): no   Delay in urine (Hesitency): no     Intensity: mild    Progression of Symptoms:  same    Accompanying Signs & Symptoms:  Fever/chills: no   Flank pain no   Nausea and vomiting: YES- some nausea   Any vaginal symptoms: vaginal discharge- typical, no change  Abdominal/Pelvic Pain: YES   History:   History of frequent UTI's: YES  History of kidney stones: no   Sexually Active: YES  Possibility of pregnancy: No    Precipitating factors:   None          Therapies Tried and outcome:  Received Macrobid on 5/24/17 and symptoms did not completely resolve. Taking Azo now       Maddy was seen last week for bladder symptoms and had a U/A that was somewhat suggestive of UTI, so she was treated with nitrofurantoin with no improvement.  Culture did not grow any bacteria.      Problem list and histories reviewed & adjusted, as indicated.  Additional history: as documented    Current Outpatient Prescriptions   Medication Sig Dispense Refill     fluticasone (FLONASE) 50 MCG/ACT spray Spray 1-2 sprays into both nostrils daily 16 g 11     LORazepam (ATIVAN) 2 MG tablet Take 2 mg by mouth every 6 hours as needed for anxiety Reported on 4/20/2017       ziprasidone (GEODON) 40 MG capsule Take 120 mg by mouth At Bedtime   5     lamoTRIgine (LAMICTAL) 200 MG tablet Take 200 mg by mouth every morning   0     ORDER FOR ALLERGEN IMMUNOTHERAPY Cat Hair, Standardized 10,000 BAU/mL, ALK  3.0 ml  Dog Hair Dander, A. P.  1:100 w/v, HS  1.0 ml  Dust Mites F 30,000AU/mL, HS  0.5 ml  Dust Mites P. 30,000 AU/mL, HS  0.5 ml   Diluent: HSA qs to 5ml 5 mL PRN     ORDER FOR ALLERGEN  "IMMUNOTHERAPY Alternaria Tenuis GLY 1:10 w/v, HS  0.5 ml  Epicoccum Nigrum 1:10 w/v, HS 0.5 ml  Hormodendrum Cladosporioides 1:10 w/v, HS 0.5 ml  Diluent: HSA qs to 5ml 5 mL PRN     ORDER FOR ALLERGEN IMMUNOTHERAPY Estuardo, White  GLY 1:20 w/v, HS  0.5 ml  Birch Mix GLY 1:20 w/v, HS  0.5 ml  Elm, American GLY 1:20 w/v, HS  0.5 ml  Hackberry GLY 1:20 w/v, HS 0.5 ml  [place], Shagbark GLY 1:20 w/v, HS  0.5 ml  [place] Mix GLY 1:20 w/v, HS 0.5 ml  Oak Mix RVW GLY 1:20 w/v, HS 0.5 ml  [place] Tree, Black GLY 1:20 w/v, HS 0.5 ml  [place], Black GLY 1:20 w/v, HS 0.5 ml  Diluent: HSA qs to 5ml 5 mL PRN     ORDER FOR ALLERGEN IMMUNOTHERAPY Kochia GLY 1:20 w/v, HS 1.0 ml  Nettle GLY 1:20 w/v, HS 1.0 ml  Plantain, English GLY 1:20 w/v, HS 1.0 ml  Ragweed Mixed 1:20 w/v ALK  0.6 ml  Russian Thistle GLY 1:20 w/v, HS 1.0 ml  Diluent: HSA qs to 5ml 5 mL PRN     EPINEPHrine (EPIPEN 2-ODETTE) 0.3 MG/0.3ML injection Inject 0.3 mLs (0.3 mg) into the muscle once as needed for anaphylaxis (Patient not taking: Reported on 5/24/2017) 0.6 mL 3     Allergies   Allergen Reactions     Animal Dander      Nkda [No Known Drug Allergies]      Seasonal Allergies        Reviewed and updated as needed this visit by clinical staff  Tobacco  Allergies  Med Hx  Surg Hx  Fam Hx  Soc Hx      Reviewed and updated as needed this visit by Provider         ROS:  Constitutional, HEENT, cardiovascular, pulmonary, gi and gu systems are negative, except as otherwise noted.    OBJECTIVE:                                                    /83 (BP Location: Left arm, Patient Position: Chair, Cuff Size: Adult Regular)  Pulse 96  Temp 97.9  F (36.6  C) (Tympanic)  Ht 5' 5.5\" (1.664 m)  Wt 193 lb 6.4 oz (87.7 kg)  SpO2 95%  BMI 31.69 kg/m2  Body mass index is 31.69 kg/(m^2).    GENERAL: healthy, alert and no distress  RESP: lungs clear to auscultation - no rales, rhonchi or wheezes  CV: regular rate and rhythm, normal S1 S2, no S3 or S4, no murmur, click or " rub  ABDOMEN: soft, tender over the bladder, no hepatosplenomegaly, no masses and bowel sounds normal  BACK: no CVA tenderness     Diagnostic Test Results:  Results for orders placed or performed in visit on 05/30/17 (from the past 24 hour(s))   *UA reflex to Microscopic and Culture ([facility] (except [place] and [place])   Result Value Ref Range    Color Urine Yellow     Appearance Urine Slightly Cloudy     Glucose Urine Negative NEG mg/dL    Bilirubin Urine Negative NEG    Ketones Urine Negative NEG mg/dL    Specific Gravity Urine 1.025 1.003 - 1.035    Blood Urine Negative NEG    pH Urine 6.5 5.0 - 7.0 pH    Protein Albumin Urine Negative NEG mg/dL    Urobilinogen Urine 1.0 0.2 - 1.0 EU/dL    Nitrite Urine Positive (A) NEG    Leukocyte Esterase Urine Negative NEG    Source Midstream Urine    Urine Microscopic   Result Value Ref Range    WBC Urine O - 2 0 - 2 /HPF    RBC Urine O - 2 0 - 2 /HPF    Squamous Epithelial /LPF Urine Few FEW /LPF        ASSESSMENT/PLAN:                                                        1. Urinary urgency    U/A shows nitrites but no WBCs and urine culture last week did not grow any bacteria, so I doubt that she has a UTI as a cause of her ongoing symptoms.  She does state she has a history of overactive bladder, and I think this is probably the cause of her current symptoms.  We briefly reviewed treatment for this- avoid bladder irritants, could consider pelvic floor PT if continues to be an ongoing problem.  Follow-up as needed.      - *UA reflex to Microscopic and Culture ([facility] (except [place] and [place])  - Urine Microscopic  - Urine Culture Aerobic Bacterial    MareValeria Solis MD  [facility]

## 2017-05-30 NOTE — MR AVS SNAPSHOT
After Visit Summary   5/30/2017    Maddy Ortiz    MRN: [id number]           Patient Information     Date Of Birth          1985        Visit Information        Provider Department      5/30/2017 8:40 AM Raymond Solis MD [facility]        Today's Diagnoses     Urinary urgency    -  1    Nonspecific finding on examination of urine          Care Instructions      Overactive Bladder Syndrome (OAB)  Your health care provider has told you that you have overactive bladder syndrome (OAB). Why OAB occurs is not known. But treatments are available to help control the bladder muscle and manage OAB. Read on to learn more.  What is overactive bladder syndrome?     Normally, urine stays in the bladder until a person decides to release it. With OAB, the bladder muscles contract involuntarily, causing a sudden urge to urinate and even urine leakage.   OAB causes the bladder muscle to contract (squeeze involuntarily). This causes an intense urge to urinate, known as urgency. Urgency can occur many times during the day and night. In some cases, accidental urine leakage occurs with the urgency. This is called urge incontinence, which is the inability to control the urinary bladder function. There are many types of incontinence, urge incontinence being one of then. A disease that affects the bladder nerves, such as multiple sclerosis, can lead to OAB. Other conditions, such as urinary tract infection (UTI) or prostate problems in men, can lead to OAB.  But the exact cause of OAB is often not known.  How is overactive bladder syndrome diagnosed?  Your health care provider examines you and asks about your symptoms and health history. You may also have one or more of the following:    Urine test to take samples of urine and have them checked for problems.    Urinary diary to record how much fluid you take in and urinate out in a 3 day period.    Bladder ultrasound to study the bladder as it empties.  Ultrasound uses sound waves to create detailed images of the inside of the body.    Cystoscopy to allow the health care provider to look for problems in the urinary tract. The test uses a thin, flexible scope called a cystoscope with a light and camera on the end. The scope is inserted into the urethra (the tube that carries urine out of the body).    Urodynamic studies, a battery of tests designed to measure and record many aspects of urinary bladder function, including pressures, volume, and urine flow.  How is overactive bladder syndrome treated?  Treatment depends on the cause and severity of your OAB. Treatments may include the following:    Changing urination habits may be suggested. For instance, your health care provider may suggest that you urinate as soon as you feel the urge. You may also need to limit how much fluid you have during the day.    Exercising your pelvic muscles can help strengthen muscles used during urination. These exercises are called Kegels. They involve nehemias as if you were stopping your urine stream and tightening your rectum as if trying not to pass gas. Your health care provider can help you learn how to do Kegels.    Biofeedback to help you learn to control the movement of your bladder muscles. Sensors are placed on your abdomen. They turn signals given off by your muscles into lines on a computer screen.    Medication may be given to relax the bladder muscle. Medication can also help ease bladder contractions, which reduces the urge to urinate.    Neuromodulation may be done if medication and behavioral changes don t work. Electrical pulses are sent to the sacral nerves (nerves that affect the pelvic area). These pulses help relieve OAB and urge incontinence.    Surgery to make the bladder larger may be done in severe cases.  With treatment, OAB can be managed. A condition, such as UTI, that has caused you to have OAB will be treated. Treatment may involve taking medications  "for months or years. You may also need to make changes in your daily routine. This may include going to the bathroom more often than you think you need to. Or, you may need to cut back on caffeine and alcohol because these can make OAB symptoms worse. Your health care provider can tell you more.     Call the health care provider right away if you have any of the following:    Fever of 100.4 F (38.0  C) or higher     No improvement with treatment    Trouble urinating because of pain    Back or abdominal pain     4978-2495 [employer]. [address] [place] [address]11. All rights reserved. This information is not intended as a substitute for professional medical care. Always follow your healthcare professional's instructions.                Follow-ups after your visit        Who to contact     If you have questions or need follow up information about today's clinic visit or your schedule please contact [facility] directly at [phone].  Normal or non-critical lab and imaging results will be communicated to you by MyChart, letter or phone within 4 business days after the clinic has received the results. If you do not hear from us within 7 days, please contact the clinic through [employer] or phone. If you have a critical or abnormal lab result, we will notify you by phone as soon as possible.  Submit refill requests through [employer] or call your pharmacy and they will forward the refill request to us. Please allow 3 business days for your refill to be completed.          Additional Information About Your Visit        [employer] Information     [employer] lets you send messages to your doctor, view your test results, renew your prescriptions, schedule appointments and more. To sign up, go to [email or web address] . Click on \"Log in\" on the left side of the screen, which will take you to the Welcome page. Then click on \"Sign up Now\" on the right side of the page.     You will be asked to " "enter the access code listed below, as well as some personal information. Please follow the directions to create your username and password.     Your access code is: [email or web address]  Expires: 2017 10:37 AM     Your access code will  in 90 days. If you need help or a new code, please call your [place] clinic or [phone].        Care EveryWhere ID     This is your Care EveryWhere ID. This could be used by other organizations to access your [place] medical records  [id number]        Your Vitals Were     Pulse Temperature Height Pulse Oximetry BMI (Body Mass Index)       96 97.9  F (36.6  C) (Tympanic) 5' 5.5\" (1.664 m) 95% 31.69 kg/m2        Blood Pressure from Last 3 Encounters:   17 129/83   17 132/82   17 121/81    Weight from Last 3 Encounters:   17 193 lb 6.4 oz (87.7 kg)   17 191 lb 11.2 oz (87 kg)   17 189 lb (85.7 kg)              We Performed the Following     *UA reflex to Microscopic and Culture ([place] [facility] (except [place] and [place])     Urine Culture Aerobic Bacterial     Urine Microscopic        Primary Care Provider Office Phone #    [facility] [phone] [address] [facility] [phone]        Thank you!     Thank you for choosing [facility]  for your care. Our goal is always to provide you with excellent care. Hearing back from our patients is one way we can continue to improve our services. Please take a few minutes to complete the written survey that you may receive in the mail after your visit with us. Thank you!             Your Updated Medication List - Protect others around you: Learn how to safely use, store and throw away your medicines at www.disposemymeds.org.          This list is accurate as of: 17  9:19 AM.  Always use your most recent med list.                   Brand Name Dispense Instructions for use    * ALLERGEN IMMUNOTHERAPY PRESCRIPTION     5 mL    Cat " Hair, Standardized 10,000 BAU/mL, ALK  3.0 ml Dog Hair Dander, A. P.  1:100 w/v, HS  1.0 ml Dust Mites F 30,000AU/mL, HS  0.5 ml Dust Mites P. 30,000 AU/mL, HS  0.5 ml  Diluent: HSA qs to 5ml       * ALLERGEN IMMUNOTHERAPY PRESCRIPTION     5 mL    Alternaria Tenuis GLY 1:10 w/v, HS  0.5 ml Epicoccum Nigrum 1:10 w/v, HS 0.5 ml Hormodendrum Cladosporioides 1:10 w/v, HS 0.5 ml Diluent: HSA qs to 5ml       * ALLERGEN IMMUNOTHERAPY PRESCRIPTION     5 mL    Estuardo, White  GLY 1:20 w/v, HS  0.5 ml Birch Mix GLY 1:20 w/v, HS  0.5 ml Elm, American GLY 1:20 w/v, HS  0.5 ml Hackberry GLY 1:20 w/v, HS 0.5 ml [place], Shagbark GLY 1:20 w/v, HS  0.5 ml [place] Mix GLY 1:20 w/v, HS 0.5 ml Oak Mix RVW GLY 1:20 w/v, HS 0.5 ml [place] Tree, Black GLY 1:20 w/v, HS 0.5 ml [place], Black GLY 1:20 w/v, HS 0.5 ml Diluent: HSA qs to 5ml       * ALLERGEN IMMUNOTHERAPY PRESCRIPTION     5 mL    Kochia GLY 1:20 w/v, HS 1.0 ml Nettle GLY 1:20 w/v, HS 1.0 ml Plantain, English GLY 1:20 w/v, HS 1.0 ml Ragweed Mixed 1:20 w/v ALK  0.6 ml Russian Thistle GLY 1:20 w/v, HS 1.0 ml Diluent: HSA qs to 5ml       EPINEPHrine 0.3 MG/0.3ML injection    EPIPEN 2-ODETTE    0.6 mL    Inject 0.3 mLs (0.3 mg) into the muscle once as needed for anaphylaxis       FLONASE 50 MCG/ACT spray   Generic drug:  fluticasone     16 g    Spray 1-2 sprays into both nostrils daily       lamoTRIgine 200 MG tablet    LaMICtal     Take 200 mg by mouth every morning       LORazepam 2 MG tablet    ATIVAN     Take 2 mg by mouth every 6 hours as needed for anxiety Reported on 4/20/2017       ziprasidone 40 MG capsule    GEODON     Take 120 mg by mouth At Bedtime       * Notice:  This list has 4 medication(s) that are the same as other medications prescribed for you. Read the directions carefully, and ask your doctor or other care provider to review them with you.

## 2017-05-30 NOTE — PATIENT INSTRUCTIONS
Overactive Bladder Syndrome (OAB)  Your health care provider has told you that you have overactive bladder syndrome (OAB). Why OAB occurs is not known. But treatments are available to help control the bladder muscle and manage OAB. Read on to learn more.  What is overactive bladder syndrome?     Normally, urine stays in the bladder until a person decides to release it. With OAB, the bladder muscles contract involuntarily, causing a sudden urge to urinate and even urine leakage.   OAB causes the bladder muscle to contract (squeeze involuntarily). This causes an intense urge to urinate, known as urgency. Urgency can occur many times during the day and night. In some cases, accidental urine leakage occurs with the urgency. This is called urge incontinence, which is the inability to control the urinary bladder function. There are many types of incontinence, urge incontinence being one of then. A disease that affects the bladder nerves, such as multiple sclerosis, can lead to OAB. Other conditions, such as urinary tract infection (UTI) or prostate problems in men, can lead to OAB.  But the exact cause of OAB is often not known.  How is overactive bladder syndrome diagnosed?  Your health care provider examines you and asks about your symptoms and health history. You may also have one or more of the following:    Urine test to take samples of urine and have them checked for problems.    Urinary diary to record how much fluid you take in and urinate out in a 3 day period.    Bladder ultrasound to study the bladder as it empties. Ultrasound uses sound waves to create detailed images of the inside of the body.    Cystoscopy to allow the health care provider to look for problems in the urinary tract. The test uses a thin, flexible scope called a cystoscope with a light and camera on the end. The scope is inserted into the urethra (the tube that carries urine out of the body).    Urodynamic studies, a battery of tests  designed to measure and record many aspects of urinary bladder function, including pressures, volume, and urine flow.  How is overactive bladder syndrome treated?  Treatment depends on the cause and severity of your OAB. Treatments may include the following:    Changing urination habits may be suggested. For instance, your health care provider may suggest that you urinate as soon as you feel the urge. You may also need to limit how much fluid you have during the day.    Exercising your pelvic muscles can help strengthen muscles used during urination. These exercises are called Kegels. They involve nehemias as if you were stopping your urine stream and tightening your rectum as if trying not to pass gas. Your health care provider can help you learn how to do Kegels.    Biofeedback to help you learn to control the movement of your bladder muscles. Sensors are placed on your abdomen. They turn signals given off by your muscles into lines on a computer screen.    Medication may be given to relax the bladder muscle. Medication can also help ease bladder contractions, which reduces the urge to urinate.    Neuromodulation may be done if medication and behavioral changes don t work. Electrical pulses are sent to the sacral nerves (nerves that affect the pelvic area). These pulses help relieve OAB and urge incontinence.    Surgery to make the bladder larger may be done in severe cases.  With treatment, OAB can be managed. A condition, such as UTI, that has caused you to have OAB will be treated. Treatment may involve taking medications for months or years. You may also need to make changes in your daily routine. This may include going to the bathroom more often than you think you need to. Or, you may need to cut back on caffeine and alcohol because these can make OAB symptoms worse. Your health care provider can tell you more.     Call the health care provider right away if you have any of the following:    Fever  of 100.4 F (38.0  C) or higher     No improvement with treatment    Trouble urinating because of pain    Back or abdominal pain     7454-6833 [employer]. [address] [place] [address], [place] [address]. All rights reserved. This information is not intended as a substitute for professional medical care. Always follow your healthcare professional's instructions.

## 2017-05-31 ENCOUNTER — HOSPITAL ENCOUNTER (EMERGENCY)
Facility: CLINIC | Age: 32
Discharge: HOME | End: 2017-05-31
Attending: EMERGENCY MEDICINE | Admitting: EMERGENCY MEDICINE
Payer: COMMERCIAL

## 2017-05-31 VITALS
SYSTOLIC BLOOD PRESSURE: 132 MMHG | WEIGHT: 193 LBS | BODY MASS INDEX: 34.2 KG/M2 | DIASTOLIC BLOOD PRESSURE: 91 MMHG | TEMPERATURE: 97.7 F | HEIGHT: 63 IN | RESPIRATION RATE: 11 BRPM | OXYGEN SATURATION: 96 %

## 2017-05-31 DIAGNOSIS — R00.2 PALPITATIONS: ICD-10-CM

## 2017-05-31 LAB
ALBUMIN SERPL-MCNC: 3.4 G/DL (ref 3.4–5)
ALP SERPL-CCNC: 60 U/L (ref 40–150)
ALT SERPL W P-5'-P-CCNC: 41 U/L (ref 0–50)
ANION GAP SERPL CALCULATED.3IONS-SCNC: 9 MMOL/L (ref 3–14)
AST SERPL W P-5'-P-CCNC: 19 U/L (ref 0–45)
BASOPHILS # BLD AUTO: 0.1 10E9/L (ref 0–0.2)
BASOPHILS NFR BLD AUTO: 0.3 %
BILIRUB SERPL-MCNC: 0.2 MG/DL (ref 0.2–1.3)
BUN SERPL-MCNC: 21 MG/DL (ref 7–30)
CALCIUM SERPL-MCNC: 8.6 MG/DL (ref 8.5–10.1)
CHLORIDE SERPL-SCNC: 106 MMOL/L (ref 94–109)
CO2 SERPL-SCNC: 24 MMOL/L (ref 20–32)
CREAT SERPL-MCNC: 0.84 MG/DL (ref 0.52–1.04)
DIFFERENTIAL METHOD BLD: ABNORMAL
EOSINOPHIL # BLD AUTO: 0.2 10E9/L (ref 0–0.7)
EOSINOPHIL NFR BLD AUTO: 1.4 %
ERYTHROCYTE [DISTWIDTH] IN BLOOD BY AUTOMATED COUNT: 12 % (ref 10–15)
GFR SERPL CREATININE-BSD FRML MDRD: 78 ML/MIN/1.7M2
GLUCOSE SERPL-MCNC: 106 MG/DL (ref 70–99)
HCT VFR BLD AUTO: 40.5 % (ref 35–47)
HGB BLD-MCNC: 13.7 G/DL (ref 11.7–15.7)
IMM GRANULOCYTES # BLD: 0.1 10E9/L (ref 0–0.4)
IMM GRANULOCYTES NFR BLD: 0.5 %
LYMPHOCYTES # BLD AUTO: 2.4 10E9/L (ref 0.8–5.3)
LYMPHOCYTES NFR BLD AUTO: 15.4 %
MCH RBC QN AUTO: 29.6 PG (ref 26.5–33)
MCHC RBC AUTO-ENTMCNC: 33.8 G/DL (ref 31.5–36.5)
MCV RBC AUTO: 88 FL (ref 78–100)
MONOCYTES # BLD AUTO: 1.8 10E9/L (ref 0–1.3)
MONOCYTES NFR BLD AUTO: 11.7 %
NEUTROPHILS # BLD AUTO: 11 10E9/L (ref 1.6–8.3)
NEUTROPHILS NFR BLD AUTO: 70.7 %
PLATELET # BLD AUTO: 235 10E9/L (ref 150–450)
POTASSIUM SERPL-SCNC: 3.4 MMOL/L (ref 3.4–5.3)
PROT SERPL-MCNC: 6 G/DL (ref 6.8–8.8)
RBC # BLD AUTO: 4.63 10E12/L (ref 3.8–5.2)
SODIUM SERPL-SCNC: 139 MMOL/L (ref 133–144)
WBC # BLD AUTO: 15.5 10E9/L (ref 4–11)

## 2017-05-31 PROCEDURE — 96360 HYDRATION IV INFUSION INIT: CPT

## 2017-05-31 PROCEDURE — 80053 COMPREHEN METABOLIC PANEL: CPT | Performed by: EMERGENCY MEDICINE

## 2017-05-31 PROCEDURE — 93010 ELECTROCARDIOGRAM REPORT: CPT | Performed by: EMERGENCY MEDICINE

## 2017-05-31 PROCEDURE — 93005 ELECTROCARDIOGRAM TRACING: CPT

## 2017-05-31 PROCEDURE — 25000128 H RX IP 250 OP 636: Performed by: EMERGENCY MEDICINE

## 2017-05-31 PROCEDURE — 85025 COMPLETE CBC W/AUTO DIFF WBC: CPT | Performed by: EMERGENCY MEDICINE

## 2017-05-31 PROCEDURE — 99284 EMERGENCY DEPT VISIT MOD MDM: CPT | Mod: 25

## 2017-05-31 PROCEDURE — 96361 HYDRATE IV INFUSION ADD-ON: CPT

## 2017-05-31 PROCEDURE — 99284 EMERGENCY DEPT VISIT MOD MDM: CPT | Mod: 25 | Performed by: EMERGENCY MEDICINE

## 2017-05-31 RX ADMIN — SODIUM CHLORIDE, POTASSIUM CHLORIDE, SODIUM LACTATE AND CALCIUM CHLORIDE 1000 ML: 600; 310; 30; 20 INJECTION, SOLUTION INTRAVENOUS at 01:59

## 2017-05-31 RX ADMIN — SODIUM CHLORIDE, POTASSIUM CHLORIDE, SODIUM LACTATE AND CALCIUM CHLORIDE 1000 ML: 600; 310; 30; 20 INJECTION, SOLUTION INTRAVENOUS at 03:07

## 2017-05-31 ASSESSMENT — ENCOUNTER SYMPTOMS
FEVER: 0
WEAKNESS: 0
DYSURIA: 0
NUMBNESS: 0
COUGH: 0
CHILLS: 0
HEADACHES: 0
BACK PAIN: 0
SHORTNESS OF BREATH: 0
CONSTIPATION: 0
ABDOMINAL PAIN: 0
NECK PAIN: 0
CHEST TIGHTNESS: 0
VOMITING: 0
LIGHT-HEADEDNESS: 1
DIARRHEA: 0
NAUSEA: 1
FATIGUE: 1

## 2017-05-31 NOTE — ED NOTES
Pt was alberto to contact parents they will be coming for her daughter who rode with there in ambulance  Lights dimmed per pt request

## 2017-05-31 NOTE — DISCHARGE INSTRUCTIONS
"  * Heart Palpitations    Palpitations refers to the feeling that your heart is beating hard, fast or irregular. Some people describe it as \"pounding\" or \"skipped beats\". Palpitations may occur in persons with heart disease, but can also occur in healthy persons. Your doctor does not believe that anything dangerous is causing your symptoms at this time.  Heart-Related Causes:    Arrhythmia (a change from the heart's normal rhythm)    Disease of the heart valves  Non-Heart-Related Causes:    Certain medicines (such as asthma inhalers and decongestants)    Some herbal supplements, energy drinks and pills, and weight loss pills    Illegal stimulant drugs (such as cocaine, crank, methamphetamine, PCP)    Caffeine, alcohol and tobacco    Medical conditions such as thyroid disease, anemia, anxiety and panic disorder  Sometimes the cause cannot be found.  Home Care:  1. Avoid excess caffeine, alcohol, tobacco and any stimulant drugs.  2. Tell your doctor about any prescription or over-the-counter or herbal medicines you take.  Follow Up  with your doctor or as advised by our staff.  Get Prompt Medical Attention  if any of the following occur together with palpitations:    Weakness, dizziness, light-headed or fainting    Chest pain or shortness of breath    Rapid heart rate (over 120 beats per minute, at rest)    Palpitations that lasts over 20 minutes    Weakness of an arm or leg or one side of the face    Difficulty with speech or vision    4450-0129 [employer]. [address] [place] [address]. All rights reserved. This information is not intended as a substitute for professional medical care. Always follow your healthcare professional's instructions.        "

## 2017-05-31 NOTE — ED NOTES
"Seen in clinic yesterday for \"overactivbe bladder\"  Gave plasma during the day  Took her Geodon at 2030 Ate chicken dinner then began to feel palpitations took benadryl  states \"it got difficult to breath i thought it was my allergies\"   Called 911 \"i felt uncomfortable with the palpitations\" walked to ambulance without difficulty enroute to ER meds began to kick in pt is now sleepy  Was sinus tach in ambulance  Reports still feeling palpitations rate in 80s no ectopy noted    "

## 2017-05-31 NOTE — ED PROVIDER NOTES
"  History     Chief Complaint   Patient presents with     palpatations     132/85  100heart rate   dizzy     HPI  Maddy Ortiz is a 31 year old female with history of bipolar, anxiety, and allergies except for evaluation of palpitations tonight.  Patient states she was preparing for bed and took her Zyprexa down as previously prescribed.  When she noticed she wasn't feeling tired, she ate some food and took several allergy medications including Zyrtec, Benadryl, and an over-the-counter cold medicine.  Patient reports she then started to develop palpitations and racing heartbeat.  Patient reports she has had similar symptoms in the past but never been diagnosed with any specific problem.  Patient denies associated headache, chest pain, difficulty breathing, abdominal pain.  Patient does report some associated dizziness and nausea.  Denies recent infectious symptoms.  Denies fever or chills.  Patient reports she has subsequently developed significant fatigue as she feels her medications have \"kicked in\"    I have reviewed the Medications, Allergies, Past Medical and Surgical History, and Social History in the Epic system.    Review of Systems   Constitutional: Positive for fatigue. Negative for chills and fever.   HENT: Negative for congestion and dental problem.    Respiratory: Negative for cough, chest tightness and shortness of breath.    Cardiovascular: Negative for chest pain.   Gastrointestinal: Positive for nausea. Negative for abdominal pain, constipation, diarrhea and vomiting.   Genitourinary: Negative for dysuria.   Musculoskeletal: Negative for back pain and neck pain.   Skin: Negative for rash.   Neurological: Positive for light-headedness. Negative for weakness, numbness and headaches.   All other systems reviewed and are negative.      Physical Exam   BP: 128/78  Heart Rate: 101  Temp: 97.7  F (36.5  C)  Resp: 17  Height: 160 cm (5' 3\")  Weight: 87.5 kg (193 lb)  SpO2: 96 %  Physical Exam "   Constitutional: She is oriented to person, place, and time. She appears well-developed and well-nourished. No distress.   Fatigued appearing, required some stimulation to awaken   HENT:   Head: Normocephalic and atraumatic.   Eyes: Conjunctivae are normal.   Neck: Normal range of motion. Neck supple.   Cardiovascular: Normal rate and normal heart sounds.    Pulmonary/Chest: Effort normal and breath sounds normal. No respiratory distress.   Abdominal: There is no tenderness.   Musculoskeletal: Normal range of motion. She exhibits no edema or tenderness.   Neurological: She is alert and oriented to person, place, and time.   Skin: Skin is warm and dry. She is not diaphoretic.   Psychiatric: She has a normal mood and affect.   Nursing note and vitals reviewed.      ED Course     ED Course          EKG Interpretation:      Interpreted by Júnior Olmos  Time reviewed:0040   Symptoms at time of EKG: palpitations   Rhythm: normal sinus   Rate: normal  Axis: NORMAL  Ectopy: none  Conduction: normal  ST Segments/ T Waves: Nonspecific T-wave abnormality with flattening  Q Waves: none  Comparison to prior: No old EKG available    Clinical Impression: Sinus rhythm with nonspecific T-wave abnormality, no acute ischemic abnormality.  No evidence of arrhythmia    Procedures        Results for orders placed or performed during the hospital encounter of 05/31/17   CBC with platelets, differential   Result Value Ref Range    WBC 15.5 (H) 4.0 - 11.0 10e9/L    RBC Count 4.63 3.8 - 5.2 10e12/L    Hemoglobin 13.7 11.7 - 15.7 g/dL    Hematocrit 40.5 35.0 - 47.0 %    MCV 88 78 - 100 fl    MCH 29.6 26.5 - 33.0 pg    MCHC 33.8 31.5 - 36.5 g/dL    RDW 12.0 10.0 - 15.0 %    Platelet Count 235 150 - 450 10e9/L    Diff Method Automated Method     % Neutrophils 70.7 %    % Lymphocytes 15.4 %    % Monocytes 11.7 %    % Eosinophils 1.4 %    % Basophils 0.3 %    % Immature Granulocytes 0.5 %    Absolute Neutrophil 11.0 (H) 1.6 - 8.3  10e9/L    Absolute Lymphocytes 2.4 0.8 - 5.3 10e9/L    Absolute Monocytes 1.8 (H) 0.0 - 1.3 10e9/L    Absolute Eosinophils 0.2 0.0 - 0.7 10e9/L    Absolute Basophils 0.1 0.0 - 0.2 10e9/L    Abs Immature Granulocytes 0.1 0 - 0.4 10e9/L   Comprehensive metabolic panel   Result Value Ref Range    Sodium 139 133 - 144 mmol/L    Potassium 3.4 3.4 - 5.3 mmol/L    Chloride 106 94 - 109 mmol/L    Carbon Dioxide 24 20 - 32 mmol/L    Anion Gap 9 3 - 14 mmol/L    Glucose 106 (H) 70 - 99 mg/dL    Urea Nitrogen 21 7 - 30 mg/dL    Creatinine 0.84 0.52 - 1.04 mg/dL    GFR Estimate 78 >60 mL/min/1.7m2    GFR Estimate If Black >90   GFR Calc   >60 mL/min/1.7m2    Calcium 8.6 8.5 - 10.1 mg/dL    Bilirubin Total 0.2 0.2 - 1.3 mg/dL    Albumin 3.4 3.4 - 5.0 g/dL    Protein Total 6.0 (L) 6.8 - 8.8 g/dL    Alkaline Phosphatase 60 40 - 150 U/L    ALT 41 0 - 50 U/L    AST 19 0 - 45 U/L         3:07 AM: Patient ambulated to the bathroom without difficulty.    4:04 AM: PT re-assessed.  Patient ambulated to the bathroom again without difficulty.  Patient reports she is feeling back to normal.    Assessments & Plan (with Medical Decision Making)  31-year-old female sent in for evaluation of palpitations tonight.  Patient reports acute onset of palpitations while eating tender after taking her normal nighttime dose of Zyprexa.  Patient had associated difficulty breathing which she thought was allergy related prompting her to take multiple allergy medications and eventually calling 911.  Symptoms gradually resolved over time.  Patient did have a moderate tachycardia without significant abnormality on EKG.  Patient hydrated in the emergency department with 2 L of fluid and her tachycardia improved along with her symptoms.  Blood work did show a mild leukocytosis with left shift of unclear etiology.  Patient is afebrile and does not report any infectious symptoms.  Metabolic panel normal without electrolyte disturbance to promote  arrhythmia.  Exact etiology unclear but could be from medication reaction, medication interaction, anxiety, or arrhythmia.  Given the absence of a clear etiology for evaluation and the resolution of symptoms, recommended close follow-up with primary care for further evaluation      I have reviewed the nursing notes.    I have reviewed the findings, diagnosis, plan and need for follow up with the patient.    New Prescriptions    No medications on file       Final diagnoses:   Palpitations       5/31/2017   [facility] EMERGENCY DEPARTMENT          Olmos, Júnior Elder MD  05/31/17 0413

## 2017-05-31 NOTE — ED AVS SNAPSHOT
[facility] Emergency Department    [address] [address]    Phone:  [phone]    Fax:  [phone]                                       Maddy Ortiz   MRN: [id number]    Department:  [facility] Emergency Department   Date of Visit:  5/31/2017           After Visit Summary Signature Page     I have received my discharge instructions, and my questions have been answered. I have discussed any challenges I see with this plan with the nurse or doctor.    ..........................................................................................................................................  Patient/Patient Representative Signature      ..........................................................................................................................................  Patient Representative Print Name and Relationship to Patient    ..................................................               ................................................  Date                                            Time    ..........................................................................................................................................  Reviewed by Signature/Title    ...................................................              ..............................................  Date                                                            Time

## 2017-06-01 LAB
BACTERIA SPEC CULT: NORMAL
MICRO REPORT STATUS: NORMAL
SPECIMEN SOURCE: NORMAL

## 2017-06-06 ENCOUNTER — ALLIED HEALTH/NURSE VISIT (OUTPATIENT)
Dept: ALLERGY | Facility: CLINIC | Age: 32
End: 2017-06-06
Payer: COMMERCIAL

## 2017-06-06 DIAGNOSIS — J30.9 ALLERGIC RHINITIS, UNSPECIFIED: Primary | ICD-10-CM

## 2017-06-06 PROCEDURE — 95117 IMMUNOTHERAPY INJECTIONS: CPT

## 2017-06-06 NOTE — MR AVS SNAPSHOT
"              After Visit Summary   6/6/2017    Maddy Ortiz    MRN: [id number]           Patient Information     Date Of Birth          1985        Visit Information        Provider Department      6/6/2017 1:00 PM ALLERGY [facility]        Today's Diagnoses     Allergic rhinitis, unspecified    -  1       Follow-ups after your visit        Your next 10 appointments already scheduled     Jul 07, 2017 11:00 AM CDT   Return Visit with Magdy Blevins, DO   [facility]    [address]  [place] [address] [phone]              Who to contact     If you have questions or need follow up information about today's clinic visit or your schedule please contact [facility] directly at [phone].  Normal or non-critical lab and imaging results will be communicated to you by MyChart, letter or phone within 4 business days after the clinic has received the results. If you do not hear from us within 7 days, please contact the clinic through MyChart or phone. If you have a critical or abnormal lab result, we will notify you by phone as soon as possible.  Submit refill requests through [employer] or call your pharmacy and they will forward the refill request to us. Please allow 3 business days for your refill to be completed.          Additional Information About Your Visit        MyChart Information     [employer] lets you send messages to your doctor, view your test results, renew your prescriptions, schedule appointments and more. To sign up, go to [email or web address] . Click on \"Log in\" on the left side of the screen, which will take you to the Welcome page. Then click on \"Sign up Now\" on the right side of the page.     You will be asked to enter the access code listed below, as well as some personal information. Please follow the directions to create your username and password.     Your access code is: " [email or web address]  Expires: 2017 10:37 AM     Your access code will  in 90 days. If you need help or a new code, please call your [facility] or [phone].        Care EveryWhere ID     This is your Care EveryWhere ID. This could be used by other organizations to access your [facility] medical records  [id number]         Blood Pressure from Last 3 Encounters:   17 (!) 132/91   17 129/83   17 132/82    Weight from Last 3 Encounters:   17 193 lb (87.5 kg)   17 193 lb 6.4 oz (87.7 kg)   17 191 lb 11.2 oz (87 kg)              We Performed the Following     Allergy Shot: Two or more injections        Primary Care Provider Office Phone #    [facility] [phone] [address] [address]        Thank you!     Thank you for choosing [facility]  for your care. Our goal is always to provide you with excellent care. Hearing back from our patients is one way we can continue to improve our services. Please take a few minutes to complete the written survey that you may receive in the mail after your visit with us. Thank you!             Your Updated Medication List - Protect others around you: Learn how to safely use, store and throw away your medicines at www.disposemymeds.org.          This list is accurate as of: 17  1:33 PM.  Always use your most recent med list.                   Brand Name Dispense Instructions for use    * ALLERGEN IMMUNOTHERAPY PRESCRIPTION     5 mL    Cat Hair, Standardized 10,000 BAU/mL, ALK  3.0 ml Dog Hair Dander, A. P.  1:100 w/v, HS  1.0 ml Dust Mites F 30,000AU/mL, HS  0.5 ml Dust Mites P. 30,000 AU/mL, HS  0.5 ml  Diluent: HSA qs to 5ml       * ALLERGEN IMMUNOTHERAPY PRESCRIPTION     5 mL    Alternaria Tenuis GLY 1:10 w/v, HS  0.5 ml Epicoccum Nigrum 1:10 w/v, HS 0.5 ml Hormodendrum Cladosporioides 1:10 w/v, HS 0.5 ml Diluent: HSA qs to 5ml       * ALLERGEN IMMUNOTHERAPY PRESCRIPTION      5 mL    Estuardo, White  GLY 1:20 w/v, HS  0.5 ml Birch Mix GLY 1:20 w/v, HS  0.5 ml Elm, American GLY 1:20 w/v, HS  0.5 ml Hackberry GLY 1:20 w/v, HS 0.5 ml Hickory, Shagbark GLY 1:20 w/v, HS  0.5 ml Lewis Mix GLY 1:20 w/v, HS 0.5 ml Oak Mix RVW GLY 1:20 w/v, HS 0.5 ml [place] Tree, Black GLY 1:20 w/v, HS 0.5 ml [place], Black GLY 1:20 w/v, HS 0.5 ml Diluent: HSA qs to 5ml       * ALLERGEN IMMUNOTHERAPY PRESCRIPTION     5 mL    Kochia GLY 1:20 w/v, HS 1.0 ml Nettle GLY 1:20 w/v, HS 1.0 ml Plantain, English GLY 1:20 w/v, HS 1.0 ml Ragweed Mixed 1:20 w/v ALK  0.6 ml Russian Thistle GLY 1:20 w/v, HS 1.0 ml Diluent: HSA qs to 5ml       BENADRYL PO      Take 50 mg by mouth nightly as needed for allergies or sleep       EPINEPHrine 0.3 MG/0.3ML injection    EPIPEN 2-ODETTE    0.6 mL    Inject 0.3 mLs (0.3 mg) into the muscle once as needed for anaphylaxis       FLONASE 50 MCG/ACT spray   Generic drug:  fluticasone     16 g    Spray 1-2 sprays into both nostrils daily       lamoTRIgine 200 MG tablet    LaMICtal     Take 200 mg by mouth every morning       ziprasidone 40 MG capsule    GEODON     Take 160 mg by mouth At Bedtime       * Notice:  This list has 4 medication(s) that are the same as other medications prescribed for you. Read the directions carefully, and ask your doctor or other care provider to review them with you.

## 2017-06-06 NOTE — PROGRESS NOTES
Patient presented after waiting 30 minutes with no reaction to  injections. Discharged from clinic.      Simona Delacruz RN

## 2017-06-13 ENCOUNTER — ALLIED HEALTH/NURSE VISIT (OUTPATIENT)
Dept: ALLERGY | Facility: CLINIC | Age: 32
End: 2017-06-13
Payer: COMMERCIAL

## 2017-06-13 ENCOUNTER — OFFICE VISIT (OUTPATIENT)
Dept: FAMILY MEDICINE | Facility: CLINIC | Age: 32
End: 2017-06-13
Payer: COMMERCIAL

## 2017-06-13 VITALS
HEART RATE: 105 BPM | WEIGHT: 192 LBS | HEIGHT: 66 IN | DIASTOLIC BLOOD PRESSURE: 87 MMHG | TEMPERATURE: 98.3 F | BODY MASS INDEX: 30.86 KG/M2 | SYSTOLIC BLOOD PRESSURE: 132 MMHG

## 2017-06-13 DIAGNOSIS — J30.9 ALLERGIC RHINITIS, UNSPECIFIED: Primary | ICD-10-CM

## 2017-06-13 DIAGNOSIS — R00.2 PALPITATIONS: Primary | ICD-10-CM

## 2017-06-13 PROCEDURE — 99214 OFFICE O/P EST MOD 30 MIN: CPT | Performed by: FAMILY MEDICINE

## 2017-06-13 PROCEDURE — 95117 IMMUNOTHERAPY INJECTIONS: CPT

## 2017-06-13 NOTE — PROGRESS NOTES
SUBJECTIVE:                                                    Maddy Ortiz is a 31 year old female who presents to clinic today for the following health issues:      ED/UC Followup:    Facility:  [facility]  Date of visit: 5-31-17  Reason for visit: palpitations EKG and blood test were done 5-31-17  Current Status: Patient again had palpitation 2 nights ago    31 yr old female with bipolar disorder here for palpitations. She has been experiencing this for the last few years. She typically notices this right after she takes her Geodon. She has mentioned this to her psychiatrist but she reports that she was told that this was unlikely . She had an episode the other day and was taken to the ER by ambulance. She reports that is the worst that it has ever been . She reports that she had some shortness of breath but no chest pain . Patient reports no dizziness or feeling of fainting. She has no cardiac history . The day she was seen in the ER she had taken Benadryl and also some other cough medication but she reports that she has been experiencing this even before that incident. No other symptoms reported.         Problem list and histories reviewed & adjusted, as indicated.  Additional history: as documented    Patient Active Problem List   Diagnosis     Seasonal allergic rhinitis     Animal dander allergy     CARDIOVASCULAR SCREENING; LDL GOAL LESS THAN 160     Contraception     Psychosis     Paranoid type delusional disorder (H)     Bipolar 1 disorder (H)     Encounter for IUD removal     Insomnia     Anxiety     Health Care Home     Orthostatic hypotension     Depression with anxiety     Seasonal allergic rhinitis due to pollen     Allergic rhinitis due to mold     Allergic rhinitis due to animal dander     Allergic rhinitis due to dust mite     Past Surgical History:   Procedure Laterality Date     TONSILLECTOMY & ADENOIDECTOMY      as a child       Social History   Substance Use Topics     Smoking status: Never Smoker      Smokeless tobacco: Never Used     Alcohol use Yes      Comment: rare wine ( very rare)     Family History   Problem Relation Age of Onset     Adopted: Yes     Unknown/Adopted Mother      Unknown/Adopted Father      Unknown/Adopted Other          Current Outpatient Prescriptions   Medication Sig Dispense Refill     DiphenhydrAMINE HCl (BENADRYL PO) Take 50 mg by mouth nightly as needed for allergies or sleep       ORDER FOR ALLERGEN IMMUNOTHERAPY Cat Hair, Standardized 10,000 BAU/mL, ALK  3.0 ml  Dog Hair Dander, A. P.  1:100 w/v, HS  1.0 ml  Dust Mites F 30,000AU/mL, HS  0.5 ml  Dust Mites P. 30,000 AU/mL, HS  0.5 ml   Diluent: HSA qs to 5ml 5 mL PRN     fluticasone (FLONASE) 50 MCG/ACT spray Spray 1-2 sprays into both nostrils daily 16 g 11     ziprasidone (GEODON) 40 MG capsule Take 160 mg by mouth At Bedtime   5     lamoTRIgine (LAMICTAL) 200 MG tablet Take 200 mg by mouth every morning   0     ORDER FOR ALLERGEN IMMUNOTHERAPY Alternaria Tenuis GLY 1:10 w/v, HS  0.5 ml  Epicoccum Nigrum 1:10 w/v, HS 0.5 ml  Hormodendrum Cladosporioides 1:10 w/v, HS 0.5 ml  Diluent: HSA qs to 5ml 5 mL PRN     ORDER FOR ALLERGEN IMMUNOTHERAPY Estuardo, White  GLY 1:20 w/v, HS  0.5 ml  Birch Mix GLY 1:20 w/v, HS  0.5 ml  Elm, American GLY 1:20 w/v, HS  0.5 ml  Hackberry GLY 1:20 w/v, HS 0.5 ml  [place], Shagbark GLY 1:20 w/v, HS  0.5 ml  [place] Mix GLY 1:20 w/v, HS 0.5 ml  Oak Mix RVW GLY 1:20 w/v, HS 0.5 ml  [place] Tree, Black GLY 1:20 w/v, HS 0.5 ml  Wendel, Black GLY 1:20 w/v, HS 0.5 ml  Diluent: HSA qs to 5ml 5 mL PRN     ORDER FOR ALLERGEN IMMUNOTHERAPY Kochia GLY 1:20 w/v, HS 1.0 ml  Nettle GLY 1:20 w/v, HS 1.0 ml  Plantain, English GLY 1:20 w/v, HS 1.0 ml  Ragweed Mixed 1:20 w/v ALK  0.6 ml  Russian Thistle GLY 1:20 w/v, HS 1.0 ml  Diluent: HSA qs to 5ml 5 mL PRN     EPINEPHrine (EPIPEN 2-ODETTE) 0.3 MG/0.3ML injection Inject 0.3 mLs (0.3 mg) into the muscle once as needed for anaphylaxis (Patient not taking: Reported on  "5/24/2017) 0.6 mL 3     Allergies   Allergen Reactions     Animal Dander      Nkda [No Known Drug Allergies]      Seasonal Allergies        Reviewed and updated as needed this visit by clinical staff  Tobacco  Allergies  Med Hx  Surg Hx  Fam Hx  Soc Hx      Reviewed and updated as needed this visit by Provider         ROS:  Constitutional, HEENT, cardiovascular, pulmonary, gi and gu systems are negative, except as otherwise noted.    OBJECTIVE:                                                    /87 (BP Location: Left arm, Cuff Size: Adult Regular)  Pulse 105  Temp 98.3  F (36.8  C) (Tympanic)  Ht 5' 5.5\" (1.664 m)  Wt 192 lb (87.1 kg)  BMI 31.46 kg/m2  Body mass index is 31.46 kg/(m^2).  GENERAL: healthy, alert and no distress  NECK: no adenopathy, no asymmetry, masses, or scars and thyroid normal to palpation  RESP: lungs clear to auscultation - no rales, rhonchi or wheezes  CV: regular rate and rhythm, normal S1 S2, no S3 or S4, no murmur, click or rub, no peripheral edema and peripheral pulses strong  ABDOMEN: soft, nontender, no hepatosplenomegaly, no masses and bowel sounds normal  MS: no gross musculoskeletal defects noted, no edema    Diagnostic Test Results:  none      ASSESSMENT/PLAN:                                                        (R00.2) Palpitations  (primary encounter diagnosis)  Comment: Patient asked to come in for a Holter monitor and when results are back she will be notified.   Plan: Holter Monitor 48 hour - Adult, TSH with free        T4 reflex FUTURE anytime        FUTURE APPOINTMENTS:       - Follow-up visit as needed    Deni Fernandez MD  [facility]  "

## 2017-06-13 NOTE — MR AVS SNAPSHOT
After Visit Summary   6/13/2017    Maddy Ortiz    MRN: [id number]           Patient Information     Date Of Birth          1985        Visit Information        Provider Department      6/13/2017 9:40 AM Deni Fernandez MD [facility]        Today's Diagnoses     Palpitations    -  1      Care Instructions          Thank you for choosing [facility].  You may be receiving a survey in the mail from [facility] regarding your visit today.  Please take a few minutes to complete and return the survey to let us know how we are doing.      If you have questions or concerns, please contact us via Zivix or you can contact your care team at [phone].    Our Clinic hours are:  Monday 6:40 am  to 7:00 pm  Tuesday -Friday 6:40 am to 5:00 pm    The [place] outpatient lab hours are:  Monday - Friday 6:10 am to 4:45 pm  Saturdays 7:00 am to 11:00 am  Appointments are required, call [phone]    If you have clinical questions after hours or would like to schedule an appointment,  call the clinic at [phone].          Follow-ups after your visit        Your next 10 appointments already scheduled     Jun 20, 2017 10:00 AM CDT   Nurse Only with ALLERGY [facility] ([facility])    [address] [address]   [phone]           Every allergy patient MUST wait 30 minutes after their allergy shot. No exceptions.  Xolair shots #1-3 should plan to wait 2 hours in clinic Xolair shots after #4 should plan 30 minute wait in clinic            Jun 20, 2017 11:20 AM CDT   Holter Monitor with [place] [facility]   [facility] ([facility])    [address] [address]   [phone]            Jun 27, 2017 10:00 AM CDT   Nurse Only with ALLERGY [facility] ([facility])    [address] [address]  "  [phone]           Every allergy patient MUST wait 30 minutes after their allergy shot. No exceptions.  Xolair shots #1-3 should plan to wait 2 hours in clinic Xolair shots after #4 should plan 30 minute wait in clinic            Jul 07, 2017 11:00 AM CDT   Return Visit with Magdy Blevins,    [facility] ([facility]    [address]  [place] [address] [phone]              Future tests that were ordered for you today     Open Future Orders        Priority Expected Expires Ordered    **TSH with free T4 reflex FUTURE anytime Routine 6/14/2017 6/14/2018 6/14/2017    Holter Monitor 48 hour - Adult Routine  7/28/2017 6/13/2017            Who to contact     If you have questions or need follow up information about today's clinic visit or your schedule please contact [facility] directly at [phone].  Normal or non-critical lab and imaging results will be communicated to you by [employer] The[employer]hart, letter or phone within 4 business days after the clinic has received the results. If you do not hear from us within 7 days, please contact the clinic through Iunikat or phone. If you have a critical or abnormal lab result, we will notify you by phone as soon as possible.  Submit refill requests through Swipely or call your pharmacy and they will forward the refill request to us. Please allow 3 business days for your refill to be completed.          Additional Information About Your Visit        [employer] Information     Swipely lets you send messages to your doctor, view your test results, renew your prescriptions, schedule appointments and more. To sign up, go to [email or web address] . Click on \"Log in\" on the left side of the screen, which will take you to the Welcome page. Then click on \"Sign up Now\" on the right side of the page.     You will be asked to enter the access code listed below, as well as some personal information. Please follow the directions to " "create your username and password.     Your access code is: [email or web address]  Expires: 2017 10:37 AM     Your access code will  in 90 days. If you need help or a new code, please call your [facility] or [phone].        Care EveryWhere ID     This is your Care EveryWhere ID. This could be used by other organizations to access your [place] medical records  [id number]        Your Vitals Were     Pulse Temperature Height BMI (Body Mass Index)          105 98.3  F (36.8  C) (Tympanic) 5' 5.5\" (1.664 m) 31.46 kg/m2         Blood Pressure from Last 3 Encounters:   17 132/87   17 (!) 132/91   17 129/83    Weight from Last 3 Encounters:   17 192 lb (87.1 kg)   17 193 lb (87.5 kg)   17 193 lb 6.4 oz (87.7 kg)               Primary Care Provider Office Phone #    [facility] [phone] [address] [address]        Thank you!     Thank you for choosing [facility]  for your care. Our goal is always to provide you with excellent care. Hearing back from our patients is one way we can continue to improve our services. Please take a few minutes to complete the written survey that you may receive in the mail after your visit with us. Thank you!             Your Updated Medication List - Protect others around you: Learn how to safely use, store and throw away your medicines at www.disposemymeds.org.          This list is accurate as of: 17 11:59 PM.  Always use your most recent med list.                   Brand Name Dispense Instructions for use    * ALLERGEN IMMUNOTHERAPY PRESCRIPTION     5 mL    Cat Hair, Standardized 10,000 BAU/mL, ALK  3.0 ml Dog Hair Dander, A. P.  1:100 w/v, HS  1.0 ml Dust Mites F 30,000AU/mL, HS  0.5 ml Dust Mites P. 30,000 AU/mL, HS  0.5 ml  Diluent: HSA qs to 5ml       * ALLERGEN IMMUNOTHERAPY PRESCRIPTION     5 mL    Alternaria Tenuis GLY 1:10 w/v, HS  0.5 ml Epicoccum Nigrum 1:10 " w/v, HS 0.5 ml Hormodendrum Cladosporioides 1:10 w/v, HS 0.5 ml Diluent: HSA qs to 5ml       * ALLERGEN IMMUNOTHERAPY PRESCRIPTION     5 mL    Estuardo, White  GLY 1:20 w/v, HS  0.5 ml Birch Mix GLY 1:20 w/v, HS  0.5 ml Elm, American GLY 1:20 w/v, HS  0.5 ml Hackberry GLY 1:20 w/v, HS 0.5 ml Hickory, Shagbark GLY 1:20 w/v, HS  0.5 ml [place] Mix GLY 1:20 w/v, HS 0.5 ml Oak Mix RVW GLY 1:20 w/v, HS 0.5 ml [place] Tree, Black GLY 1:20 w/v, HS 0.5 ml [place], Black GLY 1:20 w/v, HS 0.5 ml Diluent: HSA qs to 5ml       * ALLERGEN IMMUNOTHERAPY PRESCRIPTION     5 mL    Kochia GLY 1:20 w/v, HS 1.0 ml Nettle GLY 1:20 w/v, HS 1.0 ml Plantain, English GLY 1:20 w/v, HS 1.0 ml Ragweed Mixed 1:20 w/v ALK  0.6 ml Russian Thistle GLY 1:20 w/v, HS 1.0 ml Diluent: HSA qs to 5ml       BENADRYL PO      Take 50 mg by mouth nightly as needed for allergies or sleep       EPINEPHrine 0.3 MG/0.3ML injection    EPIPEN 2-ODETTE    0.6 mL    Inject 0.3 mLs (0.3 mg) into the muscle once as needed for anaphylaxis       FLONASE 50 MCG/ACT spray   Generic drug:  fluticasone     16 g    Spray 1-2 sprays into both nostrils daily       lamoTRIgine 200 MG tablet    LaMICtal     Take 200 mg by mouth every morning       ziprasidone 40 MG capsule    GEODON     Take 160 mg by mouth At Bedtime       * Notice:  This list has 4 medication(s) that are the same as other medications prescribed for you. Read the directions carefully, and ask your doctor or other care provider to review them with you.

## 2017-06-13 NOTE — MR AVS SNAPSHOT
After Visit Summary   6/13/2017    Maddy Ortiz    MRN: [id number]           Patient Information     Date Of Birth          1985        Visit Information        Provider Department      6/13/2017 8:45 AM ALLERGY [facility]        Today's Diagnoses     Allergic rhinitis, unspecified    -  1       Follow-ups after your visit        Your next 10 appointments already scheduled     Jun 13, 2017  9:40 AM CDT   SHORT with Deni Fernandez MD   [facility]    [address] [address]   [phone]            Jun 20, 2017 10:00 AM CDT   Nurse Only with ALLERGY [facility] ([facility])    [address] [address]   [phone]           Every allergy patient MUST wait 30 minutes after their allergy shot. No exceptions.  Xolair shots #1-3 should plan to wait 2 hours in clinic Xolair shots after #4 should plan 30 minute wait in clinic            Jul 07, 2017 11:00 AM CDT   Return Visit with Magdy Blevins DO   [facility]    [address] [place] [address] [phone]              Who to contact     If you have questions or need follow up information about today's clinic visit or your schedule please contact [facility] directly at [phone].  Normal or non-critical lab and imaging results will be communicated to you by MyChart, letter or phone within 4 business days after the clinic has received the results. If you do not hear from us within 7 days, please contact the clinic through MyChart or phone. If you have a critical or abnormal lab result, we will notify you by phone as soon as possible.  Submit refill requests through Usarium or call your pharmacy and they will forward the refill request to us. Please allow 3 business days for your refill to be completed.           "Additional Information About Your Visit        MyChart Information     ZeniMax lets you send messages to your doctor, view your test results, renew your prescriptions, schedule appointments and more. To sign up, go to [email or web address] . Click on \"Log in\" on the left side of the screen, which will take you to the Welcome page. Then click on \"Sign up Now\" on the right side of the page.     You will be asked to enter the access code listed below, as well as some personal information. Please follow the directions to create your username and password.     Your access code is: [email or web address]  Expires: 2017 10:37 AM     Your access code will  in 90 days. If you need help or a new code, please call your [place] clinic or [phone].        Care EveryWhere ID     This is your Care EveryWhere ID. This could be used by other organizations to access your [place] medical records  [id number]         Blood Pressure from Last 3 Encounters:   17 (!) 132/91   17 129/83   17 132/82    Weight from Last 3 Encounters:   17 193 lb (87.5 kg)   17 193 lb 6.4 oz (87.7 kg)   17 191 lb 11.2 oz (87 kg)              We Performed the Following     Allergy Shot: Two or more injections        Primary Care Provider Office Phone #    [facility] [phone] [address] [address]        Thank you!     Thank you for choosing [facility]  for your care. Our goal is always to provide you with excellent care. Hearing back from our patients is one way we can continue to improve our services. Please take a few minutes to complete the written survey that you may receive in the mail after your visit with us. Thank you!             Your Updated Medication List - Protect others around you: Learn how to safely use, store and throw away your medicines at www.disposemymeds.org.          This list is accurate as of: 17  9:21 AM.  Always use " your most recent med list.                   Brand Name Dispense Instructions for use    * ALLERGEN IMMUNOTHERAPY PRESCRIPTION     5 mL    Cat Hair, Standardized 10,000 BAU/mL, ALK  3.0 ml Dog Hair Dander, A. P.  1:100 w/v, HS  1.0 ml Dust Mites F 30,000AU/mL, HS  0.5 ml Dust Mites P. 30,000 AU/mL, HS  0.5 ml  Diluent: HSA qs to 5ml       * ALLERGEN IMMUNOTHERAPY PRESCRIPTION     5 mL    Alternaria Tenuis GLY 1:10 w/v, HS  0.5 ml Epicoccum Nigrum 1:10 w/v, HS 0.5 ml Hormodendrum Cladosporioides 1:10 w/v, HS 0.5 ml Diluent: HSA qs to 5ml       * ALLERGEN IMMUNOTHERAPY PRESCRIPTION     5 mL    Estuardo, White  GLY 1:20 w/v, HS  0.5 ml Birch Mix GLY 1:20 w/v, HS  0.5 ml Elm, American GLY 1:20 w/v, HS  0.5 ml Hackberry GLY 1:20 w/v, HS 0.5 ml Hickory, Shagbark GLY 1:20 w/v, HS  0.5 ml [place] Mix GLY 1:20 w/v, HS 0.5 ml Oak Mix RVW GLY 1:20 w/v, HS 0.5 ml [place] Tree, Black GLY 1:20 w/v, HS 0.5 ml [place], Black GLY 1:20 w/v, HS 0.5 ml Diluent: HSA qs to 5ml       * ALLERGEN IMMUNOTHERAPY PRESCRIPTION     5 mL    Kochia GLY 1:20 w/v, HS 1.0 ml Nettle GLY 1:20 w/v, HS 1.0 ml Plantain, English GLY 1:20 w/v, HS 1.0 ml Ragweed Mixed 1:20 w/v ALK  0.6 ml Russian Thistle GLY 1:20 w/v, HS 1.0 ml Diluent: HSA qs to 5ml       BENADRYL PO      Take 50 mg by mouth nightly as needed for allergies or sleep       EPINEPHrine 0.3 MG/0.3ML injection    EPIPEN 2-ODETTE    0.6 mL    Inject 0.3 mLs (0.3 mg) into the muscle once as needed for anaphylaxis       FLONASE 50 MCG/ACT spray   Generic drug:  fluticasone     16 g    Spray 1-2 sprays into both nostrils daily       lamoTRIgine 200 MG tablet    LaMICtal     Take 200 mg by mouth every morning       ziprasidone 40 MG capsule    GEODON     Take 160 mg by mouth At Bedtime       * Notice:  This list has 4 medication(s) that are the same as other medications prescribed for you. Read the directions carefully, and ask your doctor or other care provider to review them with you.

## 2017-06-13 NOTE — NURSING NOTE
"Chief Complaint   Patient presents with     ER F/U       Initial /87 (BP Location: Left arm, Cuff Size: Adult Regular)  Pulse 105  Temp 98.3  F (36.8  C) (Tympanic)  Ht 5' 5.5\" (1.664 m)  Wt 192 lb (87.1 kg)  BMI 31.46 kg/m2 Estimated body mass index is 31.46 kg/(m^2) as calculated from the following:    Height as of this encounter: 5' 5.5\" (1.664 m).    Weight as of this encounter: 192 lb (87.1 kg).  Medication Reconciliation: complete  "

## 2017-06-13 NOTE — PATIENT INSTRUCTIONS
Thank you for choosing [facility].  You may be receiving a survey in the mail from [facility] regarding your visit today.  Please take a few minutes to complete and return the survey to let us know how we are doing.      If you have questions or concerns, please contact us via SOLARBRUSH or you can contact your care team at [phone].    Our Clinic hours are:  Monday 6:40 am  to 7:00 pm  Tuesday -Friday 6:40 am to 5:00 pm    The [place] outpatient lab hours are:  Monday - Friday 6:10 am to 4:45 pm  Saturdays 7:00 am to 11:00 am  Appointments are required, call [phone]    If you have clinical questions after hours or would like to schedule an appointment,  call the clinic at [phone].

## 2017-06-15 ENCOUNTER — TELEPHONE (OUTPATIENT)
Dept: UROLOGY | Facility: CLINIC | Age: 32
End: 2017-06-15

## 2017-06-15 DIAGNOSIS — R39.15 URINARY URGENCY: Primary | ICD-10-CM

## 2017-06-15 NOTE — TELEPHONE ENCOUNTER
Reason for Call:  Other call back    Detailed comments: Patient called to request an referral for Physical Therapy located at the [facility], please call to discuss further    Phone Number Patient can be reached at: Home number on file [phone] (home)    Best Time: today    Can we leave a detailed message on this number? YES    Call taken on 6/15/2017 at 2:20 PM by Addie Cornejo

## 2017-06-19 ENCOUNTER — ALLIED HEALTH/NURSE VISIT (OUTPATIENT)
Dept: ALLERGY | Facility: CLINIC | Age: 32
End: 2017-06-19
Payer: COMMERCIAL

## 2017-06-19 DIAGNOSIS — J30.9 ALLERGIC RHINITIS, UNSPECIFIED: Primary | ICD-10-CM

## 2017-06-19 PROCEDURE — 99207 ZZC NO CHARGE LOS: CPT

## 2017-06-19 PROCEDURE — 95117 IMMUNOTHERAPY INJECTIONS: CPT

## 2017-06-21 ENCOUNTER — HOSPITAL ENCOUNTER (OUTPATIENT)
Dept: CARDIOLOGY | Facility: CLINIC | Age: 32
Discharge: HOME | End: 2017-06-21
Attending: FAMILY MEDICINE | Admitting: FAMILY MEDICINE
Payer: COMMERCIAL

## 2017-06-21 DIAGNOSIS — R00.2 PALPITATIONS: ICD-10-CM

## 2017-06-21 PROCEDURE — 93226 XTRNL ECG REC<48 HR SCAN A/R: CPT

## 2017-06-21 PROCEDURE — 93227 XTRNL ECG REC<48 HR R&I: CPT | Performed by: INTERNAL MEDICINE

## 2017-06-27 ENCOUNTER — ALLIED HEALTH/NURSE VISIT (OUTPATIENT)
Dept: ALLERGY | Facility: CLINIC | Age: 32
End: 2017-06-27
Payer: COMMERCIAL

## 2017-06-27 DIAGNOSIS — J30.9 ALLERGIC RHINITIS, UNSPECIFIED: Primary | ICD-10-CM

## 2017-06-27 PROCEDURE — 95117 IMMUNOTHERAPY INJECTIONS: CPT

## 2017-06-27 PROCEDURE — 99207 ZZC NO CHARGE LOS: CPT

## 2017-06-27 NOTE — MR AVS SNAPSHOT
"              After Visit Summary   6/27/2017    Maddy Ortiz    MRN: [id number]           Patient Information     Date Of Birth          1985        Visit Information        Provider Department      6/27/2017 10:00 AM ALLERGY [facility]        Today's Diagnoses     Allergic rhinitis, unspecified    -  1       Follow-ups after your visit        Your next 10 appointments already scheduled     Jul 07, 2017 11:00 AM CDT   Return Visit with Magdy Blevins, DO   [facility] ([facility]    [address]  [place] [address] [phone]              Who to contact     If you have questions or need follow up information about today's clinic visit or your schedule please contact [facility] directly at [phone].  Normal or non-critical lab and imaging results will be communicated to you by MyChart, letter or phone within 4 business days after the clinic has received the results. If you do not hear from us within 7 days, please contact the clinic through MyChart or phone. If you have a critical or abnormal lab result, we will notify you by phone as soon as possible.  Submit refill requests through Twyxt or call your pharmacy and they will forward the refill request to us. Please allow 3 business days for your refill to be completed.          Additional Information About Your Visit        MyChart Information     Twyxt lets you send messages to your doctor, view your test results, renew your prescriptions, schedule appointments and more. To sign up, go to [email or web address] . Click on \"Log in\" on the left side of the screen, which will take you to the Welcome page. Then click on \"Sign up Now\" on the right side of the page.     You will be asked to enter the access code listed below, as well as some personal information. Please follow the directions to create your username and password.     Your access code is: " [email or web address]  Expires: 2017 10:37 AM     Your access code will  in 90 days. If you need help or a new code, please call your [place] clinic or [phone].        Care EveryWhere ID     This is your Care EveryWhere ID. This could be used by other organizations to access your [place] medical records  [id number]         Blood Pressure from Last 3 Encounters:   17 132/87   17 (!) 132/91   17 129/83    Weight from Last 3 Encounters:   17 192 lb (87.1 kg)   17 193 lb (87.5 kg)   17 193 lb 6.4 oz (87.7 kg)              We Performed the Following     Allergy Shot: Two or more injections        Primary Care Provider Office Phone #    [facility] [phone] [address] [address]        Equal Access to Services     ARMOND MCKEON : Hadii aad ku hadasho Soomaali, waaxda luqadaha, qaybta kaalmada adeegyada, waxay coltenin hayflorencen srinivasan maciel . So [facility] [phone].    ATENCIÓN: Si habla español, tiene a titus disposición servicios gratuitos de asistencia lingüística. Llame al [phone].    We comply with applicable federal civil rights laws and [place] laws. We do not discriminate on the basis of race, color, national origin, age, disability sex, sexual orientation or gender identity.            Thank you!     Thank you for choosing [facility]  for your care. Our goal is always to provide you with excellent care. Hearing back from our patients is one way we can continue to improve our services. Please take a few minutes to complete the written survey that you may receive in the mail after your visit with us. Thank you!             Your Updated Medication List - Protect others around you: Learn how to safely use, store and throw away your medicines at www.disposemymeds.org.          This list is accurate as of: 17 10:34 AM.  Always use your most recent med list.                   Brand Name Dispense  Instructions for use Diagnosis    * ALLERGEN IMMUNOTHERAPY PRESCRIPTION     5 mL    Cat Hair, Standardized 10,000 BAU/mL, ALK  3.0 ml Dog Hair Dander, A. P.  1:100 w/v, HS  1.0 ml Dust Mites F 30,000AU/mL, HS  0.5 ml Dust Mites P. 30,000 AU/mL, HS  0.5 ml  Diluent: HSA qs to 5ml    Allergic rhinitis due to animal dander, Allergic rhinitis due to dust mite       * ALLERGEN IMMUNOTHERAPY PRESCRIPTION     5 mL    Alternaria Tenuis GLY 1:10 w/v, HS  0.5 ml Epicoccum Nigrum 1:10 w/v, HS 0.5 ml Hormodendrum Cladosporioides 1:10 w/v, HS 0.5 ml Diluent: HSA qs to 5ml    Allergic rhinitis due to mold       * ALLERGEN IMMUNOTHERAPY PRESCRIPTION     5 mL    Estuardo, White  GLY 1:20 w/v, HS  0.5 ml Birch Mix GLY 1:20 w/v, HS  0.5 ml Elm, American GLY 1:20 w/v, HS  0.5 ml Hackberry GLY 1:20 w/v, HS 0.5 ml Hickory, Shagbark GLY 1:20 w/v, HS  0.5 ml [place] Mix GLY 1:20 w/v, HS 0.5 ml Oak Mix RVW GLY 1:20 w/v, HS 0.5 ml [place] Tree, Black GLY 1:20 w/v, HS 0.5 ml [place], Black GLY 1:20 w/v, HS 0.5 ml Diluent: HSA qs to 5ml    Seasonal allergic rhinitis due to pollen       * ALLERGEN IMMUNOTHERAPY PRESCRIPTION     5 mL    Kochia GLY 1:20 w/v, HS 1.0 ml Nettle GLY 1:20 w/v, HS 1.0 ml Plantain, English GLY 1:20 w/v, HS 1.0 ml Ragweed Mixed 1:20 w/v ALK  0.6 ml Russian Thistle GLY 1:20 w/v, HS 1.0 ml Diluent: HSA qs to 5ml    Seasonal allergic rhinitis due to pollen       BENADRYL PO      Take 50 mg by mouth nightly as needed for allergies or sleep        EPINEPHrine 0.3 MG/0.3ML injection    EPIPEN 2-ODETTE    0.6 mL    Inject 0.3 mLs (0.3 mg) into the muscle once as needed for anaphylaxis    Need for desensitization to allergens       FLONASE 50 MCG/ACT spray   Generic drug:  fluticasone     16 g    Spray 1-2 sprays into both nostrils daily    Seasonal allergic rhinitis, unspecified allergic rhinitis trigger       lamoTRIgine 200 MG tablet    LaMICtal     Take 200 mg by mouth every morning        ziprasidone 40 MG capsule    GEODON      Take 160 mg by mouth At Bedtime        * Notice:  This list has 4 medication(s) that are the same as other medications prescribed for you. Read the directions carefully, and ask your doctor or other care provider to review them with you.

## 2017-07-06 ENCOUNTER — ALLIED HEALTH/NURSE VISIT (OUTPATIENT)
Dept: ALLERGY | Facility: CLINIC | Age: 32
End: 2017-07-06
Payer: COMMERCIAL

## 2017-07-06 DIAGNOSIS — J30.9 ALLERGIC RHINITIS, UNSPECIFIED: Primary | ICD-10-CM

## 2017-07-06 PROCEDURE — 99207 ZZC NO CHARGE LOS: CPT

## 2017-07-06 PROCEDURE — 95117 IMMUNOTHERAPY INJECTIONS: CPT

## 2017-07-06 NOTE — PROGRESS NOTES
Patient presented after waiting 30 minutes with no reaction to  injections. Discharged from clinic.   Juju Landry RN

## 2017-07-06 NOTE — MR AVS SNAPSHOT
"              After Visit Summary   7/6/2017    Maddy Ortiz    MRN: [id number]           Patient Information     Date Of Birth          1985        Visit Information        Provider Department      7/6/2017 1:00 PM ALLERGY MA - [facility]        Today's Diagnoses     Allergic rhinitis, unspecified    -  1       Follow-ups after your visit        Your next 10 appointments already scheduled     Jul 07, 2017 11:00 AM CDT   Return Visit with Magdy Blevins, DO   [facility] ([facility]    [address]  [place] [address] [phone]              Who to contact     If you have questions or need follow up information about today's clinic visit or your schedule please contact [facility] directly at [phone].  Normal or non-critical lab and imaging results will be communicated to you by MyChart, letter or phone within 4 business days after the clinic has received the results. If you do not hear from us within 7 days, please contact the clinic through MyChart or phone. If you have a critical or abnormal lab result, we will notify you by phone as soon as possible.  Submit refill requests through [employer] or call your pharmacy and they will forward the refill request to us. Please allow 3 business days for your refill to be completed.          Additional Information About Your Visit        MyChart Information     Relive lets you send messages to your doctor, view your test results, renew your prescriptions, schedule appointments and more. To sign up, go to [email or web address] . Click on \"Log in\" on the left side of the screen, which will take you to the Welcome page. Then click on \"Sign up Now\" on the right side of the page.     You will be asked to enter the access code listed below, as well as some personal information. Please follow the directions to create your username and password.     Your access code is: " [email or web address]  Expires: 2017 10:37 AM     Your access code will  in 90 days. If you need help or a new code, please call your [facility] clinic or [phone].        Care EveryWhere ID     This is your Care EveryWhere ID. This could be used by other organizations to access your [facility] medical records  [id number]         Blood Pressure from Last 3 Encounters:   17 132/87   17 (!) 132/91   17 129/83    Weight from Last 3 Encounters:   17 192 lb (87.1 kg)   17 193 lb (87.5 kg)   17 193 lb 6.4 oz (87.7 kg)              We Performed the Following     Allergy Shot: Two or more injections        Primary Care Provider Office Phone #    [facility] [phone] [address] [address]        Equal Access to Services     ARMOND MCKEON : Hadii aad ku hadasho Soomaali, waaxda luqadaha, qaybta kaalmada adeegyada, waxay coltenin hayflorencen srinivasan maciel . So [facility] [phone].    ATENCIÓN: Si habla español, tiene a titus disposición servicios gratuitos de asistencia lingüística. Llame al [phone].    We comply with applicable federal civil rights laws and [place] laws. We do not discriminate on the basis of race, color, national origin, age, disability sex, sexual orientation or gender identity.            Thank you!     Thank you for choosing [facility]  for your care. Our goal is always to provide you with excellent care. Hearing back from our patients is one way we can continue to improve our services. Please take a few minutes to complete the written survey that you may receive in the mail after your visit with us. Thank you!             Your Updated Medication List - Protect others around you: Learn how to safely use, store and throw away your medicines at www.disposemymeds.org.          This list is accurate as of: 17  1:42 PM.  Always use your most recent med list.                   Brand Name Dispense  Instructions for use Diagnosis    * ALLERGEN IMMUNOTHERAPY PRESCRIPTION     5 mL    Cat Hair, Standardized 10,000 BAU/mL, ALK  3.0 ml Dog Hair Dander, A. P.  1:100 w/v, HS  1.0 ml Dust Mites F 30,000AU/mL, HS  0.5 ml Dust Mites P. 30,000 AU/mL, HS  0.5 ml  Diluent: HSA qs to 5ml    Allergic rhinitis due to animal dander, Allergic rhinitis due to dust mite       * ALLERGEN IMMUNOTHERAPY PRESCRIPTION     5 mL    Alternaria Tenuis GLY 1:10 w/v, HS  0.5 ml Epicoccum Nigrum 1:10 w/v, HS 0.5 ml Hormodendrum Cladosporioides 1:10 w/v, HS 0.5 ml Diluent: HSA qs to 5ml    Allergic rhinitis due to mold       * ALLERGEN IMMUNOTHERAPY PRESCRIPTION     5 mL    Estuardo, White  GLY 1:20 w/v, HS  0.5 ml Birch Mix GLY 1:20 w/v, HS  0.5 ml Elm, American GLY 1:20 w/v, HS  0.5 ml Hackberry GLY 1:20 w/v, HS 0.5 ml Hickory, Shagbark GLY 1:20 w/v, HS  0.5 ml Thermal Mix GLY 1:20 w/v, HS 0.5 ml Oak Mix RVW GLY 1:20 w/v, HS 0.5 ml [place] Tree, Black GLY 1:20 w/v, HS 0.5 ml [place], Black GLY 1:20 w/v, HS 0.5 ml Diluent: HSA qs to 5ml    Seasonal allergic rhinitis due to pollen       * ALLERGEN IMMUNOTHERAPY PRESCRIPTION     5 mL    Kochia GLY 1:20 w/v, HS 1.0 ml Nettle GLY 1:20 w/v, HS 1.0 ml Plantain, English GLY 1:20 w/v, HS 1.0 ml Ragweed Mixed 1:20 w/v ALK  0.6 ml Russian Thistle GLY 1:20 w/v, HS 1.0 ml Diluent: HSA qs to 5ml    Seasonal allergic rhinitis due to pollen       BENADRYL PO      Take 50 mg by mouth nightly as needed for allergies or sleep        EPINEPHrine 0.3 MG/0.3ML injection    EPIPEN 2-ODETTE    0.6 mL    Inject 0.3 mLs (0.3 mg) into the muscle once as needed for anaphylaxis    Need for desensitization to allergens       FLONASE 50 MCG/ACT spray   Generic drug:  fluticasone     16 g    Spray 1-2 sprays into both nostrils daily    Seasonal allergic rhinitis, unspecified allergic rhinitis trigger       lamoTRIgine 200 MG tablet    LaMICtal     Take 200 mg by mouth every morning        ziprasidone 40 MG capsule    GEODON      Take 160 mg by mouth At Bedtime        * Notice:  This list has 4 medication(s) that are the same as other medications prescribed for you. Read the directions carefully, and ask your doctor or other care provider to review them with you.

## 2017-07-07 ENCOUNTER — OFFICE VISIT (OUTPATIENT)
Dept: ALLERGY | Facility: CLINIC | Age: 32
End: 2017-07-07
Payer: COMMERCIAL

## 2017-07-07 VITALS
HEART RATE: 69 BPM | SYSTOLIC BLOOD PRESSURE: 119 MMHG | DIASTOLIC BLOOD PRESSURE: 83 MMHG | WEIGHT: 192.8 LBS | BODY MASS INDEX: 32.12 KG/M2 | HEIGHT: 65 IN

## 2017-07-07 DIAGNOSIS — J30.89 ALLERGIC RHINITIS DUE TO MOLD: ICD-10-CM

## 2017-07-07 DIAGNOSIS — J30.89 ALLERGIC RHINITIS DUE TO DUST MITE: ICD-10-CM

## 2017-07-07 DIAGNOSIS — J30.1 CHRONIC SEASONAL ALLERGIC RHINITIS DUE TO POLLEN: Primary | ICD-10-CM

## 2017-07-07 DIAGNOSIS — J30.81 ALLERGIC RHINITIS DUE TO ANIMAL DANDER: ICD-10-CM

## 2017-07-07 PROCEDURE — 99213 OFFICE O/P EST LOW 20 MIN: CPT | Performed by: ALLERGY & IMMUNOLOGY

## 2017-07-07 RX ORDER — CETIRIZINE HYDROCHLORIDE 10 MG/1
10 TABLET ORAL EVERY EVENING
Qty: 30 TABLET | Refills: 11 | Status: ON HOLD | OUTPATIENT
Start: 2017-07-07 | End: 2018-05-02

## 2017-07-07 NOTE — PATIENT INSTRUCTIONS
- Use Flonase 2 sprays/nostril daily if symptomatic with nasal congestion, sneezing, runny nose or post nasal drip. Use everyday if develop persistent symptoms.   - Zyrtec 10mg by mouth daily on allergy shot days. You can also use daily as needed for allergy symptoms.   - Continue allergy shots.   - Return to clinic in 1 year.

## 2017-07-07 NOTE — MR AVS SNAPSHOT
After Visit Summary   7/7/2017    Maddy Ortiz    MRN: [id number]           Patient Information     Date Of Birth          1985        Visit Information        Provider Department      7/7/2017 11:00 AM Magdy Blevins,  [facility]        Today's Diagnoses     Chronic seasonal allergic rhinitis due to pollen    -  1      Care Instructions    - Use Flonase 2 sprays/nostril daily if symptomatic with nasal congestion, sneezing, runny nose or post nasal drip. Use everyday if develop persistent symptoms.   - Zyrtec 10mg by mouth daily on allergy shot days. You can also use daily as needed for allergy symptoms.   - Continue allergy shots.   - Return to clinic in 1 year.           Follow-ups after your visit        Follow-up notes from your care team     Return in about 1 year (around 7/7/2018).      Your next 10 appointments already scheduled     Jul 12, 2017 10:15 AM CDT   Nurse Only with ALLERGY MA - [facility] ([facility])    [address] [address]   [phone]           Every allergy patient MUST wait 30 minutes after their allergy shot. No exceptions.  Xolair shots #1-3 should plan to wait 2 hours in clinic Xolair shots after #4 should plan 30 minute wait in clinic              Who to contact     If you have questions or need follow up information about today's clinic visit or your schedule please contact [facility] directly at [phone].  Normal or non-critical lab and imaging results will be communicated to you by MyChart, letter or phone within 4 business days after the clinic has received the results. If you do not hear from us within 7 days, please contact the clinic through MyChart or phone. If you have a critical or abnormal lab result, we will notify you by phone as soon as possible.  Submit refill requests through [employer] or call your pharmacy and they will forward the refill request to us.  "Please allow 3 business days for your refill to be completed.          Additional Information About Your Visit        MyChart Information     [employer] lets you send messages to your doctor, view your test results, renew your prescriptions, schedule appointments and more. To sign up, go to [email or web address] . Click on \"Log in\" on the left side of the screen, which will take you to the Welcome page. Then click on \"Sign up Now\" on the right side of the page.     You will be asked to enter the access code listed below, as well as some personal information. Please follow the directions to create your username and password.     Your access code is: [email or web address]  Expires: 2017 10:37 AM     Your access code will  in 90 days. If you need help or a new code, please call your [facility] clinic or [phone].        Care EveryWhere ID     This is your Care EveryWhere ID. This could be used by other organizations to access your [facility] medical records  [id number]        Your Vitals Were     Pulse Height BMI (Body Mass Index)             69 1.649 m (5' 4.92\") 32.16 kg/m2          Blood Pressure from Last 3 Encounters:   17 119/83   17 132/87   17 (!) 132/91    Weight from Last 3 Encounters:   17 87.5 kg (192 lb 12.8 oz)   17 87.1 kg (192 lb)   17 87.5 kg (193 lb)              Today, you had the following     No orders found for display         Today's Medication Changes          These changes are accurate as of: 17 11:13 AM.  If you have any questions, ask your nurse or doctor.               Start taking these medicines.        Dose/Directions    cetirizine 10 MG tablet   Commonly known as:  zyrTEC   Used for:  Chronic seasonal allergic rhinitis due to pollen   Started by:  Magdy Blevins DO        Dose:  10 mg   Take 1 tablet (10 mg) by mouth every evening   Quantity:  30 tablet   Refills:  11            Where to get your medicines      These medications were sent " to [facility] [place], [place] - [address] [facility]  [address] [facility] [place] [address]     Phone:  [phone]     cetirizine 10 MG tablet                Primary Care Provider Office Phone #    [facility] [phone] [address] [address]        Equal Access to Services     ARMOND MCKEON : Hadii aad ku hadasho Soomaali, waaxda luqadaha, qaybta kaalmada adeegyada, waxay idiin hayaan adeeg kharash la'aan . So [facility] [phone].    ATENCIÓN: Si habla español, tiene a titus disposición servicios gratuitos de asistencia lingüística. Yadira al [phone].    We comply with applicable federal civil rights laws and [place] laws. We do not discriminate on the basis of race, color, national origin, age, disability sex, sexual orientation or gender identity.            Thank you!     Thank you for choosing [facility]  for your care. Our goal is always to provide you with excellent care. Hearing back from our patients is one way we can continue to improve our services. Please take a few minutes to complete the written survey that you may receive in the mail after your visit with us. Thank you!             Your Updated Medication List - Protect others around you: Learn how to safely use, store and throw away your medicines at www.disposemymeds.org.          This list is accurate as of: 7/7/17 11:13 AM.  Always use your most recent med list.                   Brand Name Dispense Instructions for use Diagnosis    * ALLERGEN IMMUNOTHERAPY PRESCRIPTION     5 mL    Cat Hair, Standardized 10,000 BAU/mL, ALK  3.0 ml Dog Hair Dander, A. P.  1:100 w/v, HS  1.0 ml Dust Mites F 30,000AU/mL, HS  0.5 ml Dust Mites P. 30,000 AU/mL, HS  0.5 ml  Diluent: HSA qs to 5ml    Allergic rhinitis due to animal dander, Allergic rhinitis due to dust mite       * ALLERGEN IMMUNOTHERAPY PRESCRIPTION     5 mL    Alternaria Tenuis GLY 1:10 w/v, HS  0.5 ml Epicoccum Nigrum  1:10 w/v, HS 0.5 ml Hormodendrum Cladosporioides 1:10 w/v, HS 0.5 ml Diluent: HSA qs to 5ml    Allergic rhinitis due to mold       * ALLERGEN IMMUNOTHERAPY PRESCRIPTION     5 mL    Estuardo, White  GLY 1:20 w/v, HS  0.5 ml Birch Mix GLY 1:20 w/v, HS  0.5 ml Elm, American GLY 1:20 w/v, HS  0.5 ml Hackberry GLY 1:20 w/v, HS 0.5 ml Hickory, Shagbark GLY 1:20 w/v, HS  0.5 ml [place] Mix GLY 1:20 w/v, HS 0.5 ml Oak Mix RVW GLY 1:20 w/v, HS 0.5 ml [place] Tree, Black GLY 1:20 w/v, HS 0.5 ml [place], Black GLY 1:20 w/v, HS 0.5 ml Diluent: HSA qs to 5ml    Seasonal allergic rhinitis due to pollen       * ALLERGEN IMMUNOTHERAPY PRESCRIPTION     5 mL    Kochia GLY 1:20 w/v, HS 1.0 ml Nettle GLY 1:20 w/v, HS 1.0 ml Plantain, English GLY 1:20 w/v, HS 1.0 ml Ragweed Mixed 1:20 w/v ALK  0.6 ml Russian Thistle GLY 1:20 w/v, HS 1.0 ml Diluent: HSA qs to 5ml    Seasonal allergic rhinitis due to pollen       BENADRYL PO      Take 50 mg by mouth nightly as needed for allergies or sleep        cetirizine 10 MG tablet    zyrTEC    30 tablet    Take 1 tablet (10 mg) by mouth every evening    Chronic seasonal allergic rhinitis due to pollen       EPINEPHrine 0.3 MG/0.3ML injection    EPIPEN 2-ODETTE    0.6 mL    Inject 0.3 mLs (0.3 mg) into the muscle once as needed for anaphylaxis    Need for desensitization to allergens       FLONASE 50 MCG/ACT spray   Generic drug:  fluticasone     16 g    Spray 1-2 sprays into both nostrils daily    Seasonal allergic rhinitis, unspecified allergic rhinitis trigger       lamoTRIgine 200 MG tablet    LaMICtal     Take 200 mg by mouth every morning        ziprasidone 40 MG capsule    GEODON     Take 160 mg by mouth At Bedtime        * Notice:  This list has 4 medication(s) that are the same as other medications prescribed for you. Read the directions carefully, and ask your doctor or other care provider to review them with you.

## 2017-07-07 NOTE — NURSING NOTE
"Chief Complaint   Patient presents with     RECHECK       Initial /83  Pulse 69  Ht 1.649 m (5' 4.92\")  Wt 87.5 kg (192 lb 12.8 oz)  BMI 32.16 kg/m2 Estimated body mass index is 32.16 kg/(m^2) as calculated from the following:    Height as of this encounter: 1.649 m (5' 4.92\").    Weight as of this encounter: 87.5 kg (192 lb 12.8 oz).  Medication Reconciliation: complete    "

## 2017-07-07 NOTE — PROGRESS NOTES
Maddy Ortiz is a 31 year old  female with previous medical history significant for allergic rhinitis who returns for a follow up visit. Maddy Ortiz is being seen today for seasonal allergies.     As you are aware the patient has a history of seasonal and perennial allergy symptoms. Seasons involved include fall and spring. Allergy testing in 2013 positive for dust mite, cat, dog, molds, trees, grasses and weeds. She was started on allergen immunotherapy. She did not start allergen immunotherapy till late spring of 2017. She has tolerated allergen immunotherapy today. She is not taking oral antihistamine on the day of her allergy shot. No systemic reactions. No large local reactions. She currently denies any congestion, rhinorrhea, nasal itching, postnasal drainage, ocular itching or ocular watering. This spring she had congestion and sinus pressure. She treated with Sudafed and over-the-counter cold medication. She did not use oral antihistamine or nasal corticosteroid. She has not used her ocular antihistamine. She wishes to continue on allergen immunotherapy.        Past Medical History:   Diagnosis Date     Bipolar 1 disorder (H) 12/6/2012     Paranoid type delusional disorder (H) 11/14/2012     Family History   Problem Relation Age of Onset     Adopted: Yes     Unknown/Adopted Mother      Unknown/Adopted Father      Unknown/Adopted Other      Past Surgical History:   Procedure Laterality Date     TONSILLECTOMY & ADENOIDECTOMY      as a child       REVIEW OF SYSTEMS:  General: negative for weight gain. negative for weight loss. negative for changes in sleep.   Ears: negative for fullness. negative for hearing loss. positive  for dizziness.   Nose: negative for snoring.negative for changes in smell. negative for drainage.   Throat: negative for hoarseness. negative for sore throat. negative for trouble swallowing.   Lungs: negative for shortness of breath.negative for wheezing. negative for sputum  production.   Cardiovascular: negative for chest pain. negative for swelling of ankles. negative for fast or irregular heartbeat.   Gastrointestinal: negative for nausea. negative for heartburn. positive  for acid reflux.   Musculoskeletal: negative for joint pain. negative for joint stiffness. negative for joint swelling.   Neurologic: negative for seizures. negative for fainting. negative for weakness.   Psychiatric: negative for changes in mood. positive  for anxiety.   Endocrine: negative for cold intolerance. negative for heat intolerance. negative for tremors.   Hematologic: negative for easy bruising. negative for easy bleeding.  Integumentary: negative for rash. negative for scaling. negative for nail changes.       Current Outpatient Prescriptions:      cetirizine (ZYRTEC) 10 MG tablet, Take 1 tablet (10 mg) by mouth every evening, Disp: 30 tablet, Rfl: 11     DiphenhydrAMINE HCl (BENADRYL PO), Take 50 mg by mouth nightly as needed for allergies or sleep, Disp: , Rfl:      ORDER FOR ALLERGEN IMMUNOTHERAPY, Cat Hair, Standardized 10,000 BAU/mL, ALK  3.0 ml Dog Hair Dander, A. P.  1:100 w/v, HS  1.0 ml Dust Mites F 30,000AU/mL, HS  0.5 ml Dust Mites P. 30,000 AU/mL, HS  0.5 ml  Diluent: HSA qs to 5ml, Disp: 5 mL, Rfl: PRN     ORDER FOR ALLERGEN IMMUNOTHERAPY, Alternaria Tenuis GLY 1:10 w/v, HS  0.5 ml Epicoccum Nigrum 1:10 w/v, HS 0.5 ml Hormodendrum Cladosporioides 1:10 w/v, HS 0.5 ml Diluent: HSA qs to 5ml, Disp: 5 mL, Rfl: PRN     ORDER FOR ALLERGEN IMMUNOTHERAPY, Estuardo, White  GLY 1:20 w/v, HS  0.5 ml Birch Mix GLY 1:20 w/v, HS  0.5 ml Elm, American GLY 1:20 w/v, HS  0.5 ml Hackberry GLY 1:20 w/v, HS 0.5 ml Hickory, Shagbark GLY 1:20 w/v, HS  0.5 ml [place] Mix GLY 1:20 w/v, HS 0.5 ml Oak Mix RVW GLY 1:20 w/v, HS 0.5 ml [place] Tree, Black GLY 1:20 w/v, HS 0.5 ml [place], Black GLY 1:20 w/v, HS 0.5 ml Diluent: HSA qs to 5ml, Disp: 5 mL, Rfl: PRN     ORDER FOR ALLERGEN IMMUNOTHERAPY, Phyllis GLY 1:20 w/v, HS  1.0 ml Nettle GLY 1:20 w/v, HS 1.0 ml Plantain, English GLY 1:20 w/v, HS 1.0 ml Ragweed Mixed 1:20 w/v ALK  0.6 ml Russian Thistle GLY 1:20 w/v, HS 1.0 ml Diluent: HSA qs to 5ml, Disp: 5 mL, Rfl: PRN     EPINEPHrine (EPIPEN 2-ODETTE) 0.3 MG/0.3ML injection, Inject 0.3 mLs (0.3 mg) into the muscle once as needed for anaphylaxis, Disp: 0.6 mL, Rfl: 3     fluticasone (FLONASE) 50 MCG/ACT spray, Spray 1-2 sprays into both nostrils daily, Disp: 16 g, Rfl: 11     ziprasidone (GEODON) 40 MG capsule, Take 160 mg by mouth At Bedtime , Disp: , Rfl: 5     lamoTRIgine (LAMICTAL) 200 MG tablet, Take 200 mg by mouth every morning , Disp: , Rfl: 0  Immunization History   Administered Date(s) Administered     Influenza (IIV3) 12/06/2012     TDAP Vaccine (Adacel) 12/06/2012     Allergies   Allergen Reactions     Animal Dander      Nkda [No Known Drug Allergies]      Seasonal Allergies          EXAM:   Constitutional:  Appears well-developed and well-nourished. No distress.   HEENT:   Head: Normocephalic.   Right Ear: External ear normal. TM normal  Left Ear: External ear normal. TM normal  Mouth/Throat: No cobblestoning.   Boggy nasal tissue and pale.    Eyes: Conjunctivae are non-erythematous   No maxillary or frontal sinus tenderness to palpation.   Cardiovascular: Normal rate, regular rhythm and normal heart sounds. Exam reveals no gallop and no friction rub.   No murmur heard.  Respiratory: Effort normal and breath sounds normal. No respiratory distress. No wheezes. No rales.   Musculoskeletal: Normal range of motion.   Lymphadenopathy:   No cervical adenopathy.   No lower extremity edema.   Neuro: Oriented to person, place, and time.  Skin: Skin is warm and dry. No rash noted.   Psychiatric: Normal mood and affect.     Nursing note and vitals reviewed.      ASSESSMENT/PLAN:  Problem List Items Addressed This Visit        Respiratory    Seasonal allergic rhinitis - Primary     History of nasal and ocular symptoms for multiple  years. Perennial with spring and fall worsening. Tried cetirizine and loratadine which were helpful. No use of nasal corticosteroid. Allergy testing done in 2013 positive for dust mite, cat, dog, molds, trees, grasses and weeds.      Skin testing  Positive for cat, dog, dust mites, weeds, trees and molds.      - Flonase 2 spray/nostril daily. Use spring and fall at the very least.   - Cetirizine as needed and on allergy shot days.   - Pazeo 1 gtt/eye daily as needed.   - Continue allergen immunotherapy.          Relevant Medications    cetirizine (ZYRTEC) 10 MG tablet    Allergic rhinitis due to mold    Relevant Medications    cetirizine (ZYRTEC) 10 MG tablet    Allergic rhinitis due to animal dander    Relevant Medications    cetirizine (ZYRTEC) 10 MG tablet    Allergic rhinitis due to dust mite    Relevant Medications    cetirizine (ZYRTEC) 10 MG tablet          Chart documentation with Dragon Voice recognition Software. Although reviewed after completion, some words and grammatical errors may remain.    Magdy Blevins,    Allergy/Immunology  [facility] [place]

## 2017-07-07 NOTE — ASSESSMENT & PLAN NOTE
History of nasal and ocular symptoms for multiple years. Perennial with spring and fall worsening. Tried cetirizine and loratadine which were helpful. No use of nasal corticosteroid. Allergy testing done in 2013 positive for dust mite, cat, dog, molds, trees, grasses and weeds.      Skin testing  Positive for cat, dog, dust mites, weeds, trees and molds.      - Flonase 2 spray/nostril daily. Use spring and fall at the very least.   - Cetirizine as needed and on allergy shot days.   - Pazeo 1 gtt/eye daily as needed.   - Continue allergen immunotherapy.

## 2017-07-10 ENCOUNTER — TELEPHONE (OUTPATIENT)
Dept: FAMILY MEDICINE | Facility: CLINIC | Age: 32
End: 2017-07-10

## 2017-07-10 DIAGNOSIS — B85.2 LICE: Primary | ICD-10-CM

## 2017-07-10 NOTE — TELEPHONE ENCOUNTER
Patient calls clinic.  Daughter has lice.  Patient now has lice.  RX sent to pharmacy.  Jovanna HERNANDEZ RN

## 2017-07-12 ENCOUNTER — ALLIED HEALTH/NURSE VISIT (OUTPATIENT)
Dept: ALLERGY | Facility: CLINIC | Age: 32
End: 2017-07-12
Payer: COMMERCIAL

## 2017-07-12 DIAGNOSIS — J30.9 ALLERGIC RHINITIS, UNSPECIFIED: Primary | ICD-10-CM

## 2017-07-12 PROCEDURE — 95117 IMMUNOTHERAPY INJECTIONS: CPT

## 2017-07-12 PROCEDURE — 99207 ZZC NO CHARGE LOS: CPT

## 2017-07-12 NOTE — MR AVS SNAPSHOT
"              After Visit Summary   2017    Maddy Ortiz    MRN: [id number]           Patient Information     Date Of Birth          1985        Visit Information        Provider Department      2017 10:15 AM ALLERGY MA - [facility]        Today's Diagnoses     Allergic rhinitis, unspecified    -  1       Follow-ups after your visit        Who to contact     If you have questions or need follow up information about today's clinic visit or your schedule please contact [facility] directly at [phone].  Normal or non-critical lab and imaging results will be communicated to you by Shomptonhart, letter or phone within 4 business days after the clinic has received the results. If you do not hear from us within 7 days, please contact the clinic through Shomptonhart or phone. If you have a critical or abnormal lab result, we will notify you by phone as soon as possible.  Submit refill requests through Glanse or call your pharmacy and they will forward the refill request to us. Please allow 3 business days for your refill to be completed.          Additional Information About Your Visit        MyChart Information     Glanse lets you send messages to your doctor, view your test results, renew your prescriptions, schedule appointments and more. To sign up, go to [email or web address] . Click on \"Log in\" on the left side of the screen, which will take you to the Welcome page. Then click on \"Sign up Now\" on the right side of the page.     You will be asked to enter the access code listed below, as well as some personal information. Please follow the directions to create your username and password.     Your access code is: [email or web address]  Expires: 2017 10:37 AM     Your access code will  in 90 days. If you need help or a new code, please call your [facility] or [phone].        Care EveryWhere ID     This is your Care EveryWhere ID. This could be used by other " organizations to access your [place] medical records  [id number]         Blood Pressure from Last 3 Encounters:   07/07/17 119/83   06/13/17 132/87   05/31/17 (!) 132/91    Weight from Last 3 Encounters:   07/07/17 192 lb 12.8 oz (87.5 kg)   06/13/17 192 lb (87.1 kg)   05/31/17 193 lb (87.5 kg)              We Performed the Following     Allergy Shot: Two or more injections        Primary Care Provider Office Phone #    [facility] [phone] [address] [address]        Equal Access to Services     ARMODN MCKEON : Hadii aad ku hadasho Soradha, waaxda luqadaha, qaybta kaalmada adeegyada, lincoln maciel . So [facility] [phone].    ATENCIÓN: Si habla español, tiene a titus disposición servicios gratuitos de asistencia lingüística. [facility] [phone].    We comply with applicable federal civil rights laws and [place] laws. We do not discriminate on the basis of race, color, national origin, age, disability sex, sexual orientation or gender identity.            Thank you!     Thank you for choosing [facility]  for your care. Our goal is always to provide you with excellent care. Hearing back from our patients is one way we can continue to improve our services. Please take a few minutes to complete the written survey that you may receive in the mail after your visit with us. Thank you!             Your Updated Medication List - Protect others around you: Learn how to safely use, store and throw away your medicines at www.disposemymeds.org.          This list is accurate as of: 7/12/17 11:01 AM.  Always use your most recent med list.                   Brand Name Dispense Instructions for use Diagnosis    * ALLERGEN IMMUNOTHERAPY PRESCRIPTION     5 mL    Cat Hair, Standardized 10,000 BAU/mL, ALK  3.0 ml Dog Hair Dander, A. P.  1:100 w/v, HS  1.0 ml Dust Mites F 30,000AU/mL, HS  0.5 ml Dust Mites P. 30,000 AU/mL, HS  0.5 ml   Diluent: HSA qs to 5ml    Allergic rhinitis due to animal dander, Allergic rhinitis due to dust mite       * ALLERGEN IMMUNOTHERAPY PRESCRIPTION     5 mL    Alternaria Tenuis GLY 1:10 w/v, HS  0.5 ml Epicoccum Nigrum 1:10 w/v, HS 0.5 ml Hormodendrum Cladosporioides 1:10 w/v, HS 0.5 ml Diluent: HSA qs to 5ml    Allergic rhinitis due to mold       * ALLERGEN IMMUNOTHERAPY PRESCRIPTION     5 mL    Estuardo, White  GLY 1:20 w/v, HS  0.5 ml Birch Mix GLY 1:20 w/v, HS  0.5 ml Elm, American GLY 1:20 w/v, HS  0.5 ml Hackberry GLY 1:20 w/v, HS 0.5 ml Hickory, Shagbark GLY 1:20 w/v, HS  0.5 ml [place] Mix GLY 1:20 w/v, HS 0.5 ml Oak Mix RVW GLY 1:20 w/v, HS 0.5 ml [place] Tree, Black GLY 1:20 w/v, HS 0.5 ml Cascade, Black GLY 1:20 w/v, HS 0.5 ml Diluent: HSA qs to 5ml    Seasonal allergic rhinitis due to pollen       * ALLERGEN IMMUNOTHERAPY PRESCRIPTION     5 mL    Kochia GLY 1:20 w/v, HS 1.0 ml Nettle GLY 1:20 w/v, HS 1.0 ml Plantain, English GLY 1:20 w/v, HS 1.0 ml Ragweed Mixed 1:20 w/v ALK  0.6 ml Russian Thistle GLY 1:20 w/v, HS 1.0 ml Diluent: HSA qs to 5ml    Seasonal allergic rhinitis due to pollen       BENADRYL PO      Take 50 mg by mouth nightly as needed for allergies or sleep        cetirizine 10 MG tablet    zyrTEC    30 tablet    Take 1 tablet (10 mg) by mouth every evening    Chronic seasonal allergic rhinitis due to pollen       EPINEPHrine 0.3 MG/0.3ML injection    EPIPEN 2-ODETTE    0.6 mL    Inject 0.3 mLs (0.3 mg) into the muscle once as needed for anaphylaxis    Need for desensitization to allergens       FLONASE 50 MCG/ACT spray   Generic drug:  fluticasone     16 g    Spray 1-2 sprays into both nostrils daily    Seasonal allergic rhinitis, unspecified allergic rhinitis trigger       lamoTRIgine 200 MG tablet    LaMICtal     Take 200 mg by mouth every morning        permethrin 1 % Liqd     60 mL    Apply to clean, towel-dried hair, saturate hair and scalp, wash off after 10 min.    Lice       ziprasidone 40 MG  capsule    GEODON     Take 160 mg by mouth At Bedtime        * Notice:  This list has 4 medication(s) that are the same as other medications prescribed for you. Read the directions carefully, and ask your doctor or other care provider to review them with you.

## 2017-07-19 ENCOUNTER — ALLIED HEALTH/NURSE VISIT (OUTPATIENT)
Dept: ALLERGY | Facility: CLINIC | Age: 32
End: 2017-07-19
Payer: COMMERCIAL

## 2017-07-19 DIAGNOSIS — J30.9 ALLERGIC RHINITIS, UNSPECIFIED: Primary | ICD-10-CM

## 2017-07-19 PROCEDURE — 99207 ZZC NO CHARGE LOS: CPT

## 2017-07-19 PROCEDURE — 95117 IMMUNOTHERAPY INJECTIONS: CPT

## 2017-07-19 NOTE — PROGRESS NOTES
Patient presented after waiting 30 minutes with no reaction to  injections. Discharged from clinic.    Elizabeth Melchor LPN

## 2017-07-19 NOTE — MR AVS SNAPSHOT
"              After Visit Summary   2017    Maddy Ortiz    MRN: [id number]           Patient Information     Date Of Birth          1985        Visit Information        Provider Department      2017 9:15 AM ALLERGY MA - [facility]        Today's Diagnoses     Allergic rhinitis, unspecified    -  1       Follow-ups after your visit        Who to contact     If you have questions or need follow up information about today's clinic visit or your schedule please contact [facility] directly at [phone].  Normal or non-critical lab and imaging results will be communicated to you by 3KeyIthart, letter or phone within 4 business days after the clinic has received the results. If you do not hear from us within 7 days, please contact the clinic through 3KeyIthart or phone. If you have a critical or abnormal lab result, we will notify you by phone as soon as possible.  Submit refill requests through Caro Nut or call your pharmacy and they will forward the refill request to us. Please allow 3 business days for your refill to be completed.          Additional Information About Your Visit        MyChart Information     Caro Nut lets you send messages to your doctor, view your test results, renew your prescriptions, schedule appointments and more. To sign up, go to [email or web address] . Click on \"Log in\" on the left side of the screen, which will take you to the Welcome page. Then click on \"Sign up Now\" on the right side of the page.     You will be asked to enter the access code listed below, as well as some personal information. Please follow the directions to create your username and password.     Your access code is: [email or web address]  Expires: 2017 10:37 AM     Your access code will  in 90 days. If you need help or a new code, please call your [facility] or [phone].        Care EveryWhere ID     This is your Care EveryWhere ID. This could be used by other " organizations to access your [place] medical records  [id number]         Blood Pressure from Last 3 Encounters:   07/07/17 119/83   06/13/17 132/87   05/31/17 (!) 132/91    Weight from Last 3 Encounters:   07/07/17 192 lb 12.8 oz (87.5 kg)   06/13/17 192 lb (87.1 kg)   05/31/17 193 lb (87.5 kg)              We Performed the Following     Allergy Shot: Two or more injections        Primary Care Provider Office Phone #    [facility] [phone] [address] [address]        Equal Access to Services     ARMOND MCKEON : Hadii aad ku hadasho Soradha, waaxda luqadaha, qaybta kaalmada adeegyada, lincoln maciel . So [facility] [phone].    ATENCIÓN: Si habla español, tiene a titus disposición servicios gratuitos de asistencia lingüística. Ll[facility] [phone].    We comply with applicable federal civil rights laws and [place] laws. We do not discriminate on the basis of race, color, national origin, age, disability sex, sexual orientation or gender identity.            Thank you!     Thank you for choosing [facility]  for your care. Our goal is always to provide you with excellent care. Hearing back from our patients is one way we can continue to improve our services. Please take a few minutes to complete the written survey that you may receive in the mail after your visit with us. Thank you!             Your Updated Medication List - Protect others around you: Learn how to safely use, store and throw away your medicines at www.disposemymeds.org.          This list is accurate as of: 7/19/17  9:44 AM.  Always use your most recent med list.                   Brand Name Dispense Instructions for use Diagnosis    * ALLERGEN IMMUNOTHERAPY PRESCRIPTION     5 mL    Cat Hair, Standardized 10,000 BAU/mL, ALK  3.0 ml Dog Hair Dander, A. P.  1:100 w/v, HS  1.0 ml Dust Mites F 30,000AU/mL, HS  0.5 ml Dust Mites P. 30,000 AU/mL, HS  0.5 ml   Diluent: HSA qs to 5ml    Allergic rhinitis due to animal dander, Allergic rhinitis due to dust mite       * ALLERGEN IMMUNOTHERAPY PRESCRIPTION     5 mL    Alternaria Tenuis GLY 1:10 w/v, HS  0.5 ml Epicoccum Nigrum 1:10 w/v, HS 0.5 ml Hormodendrum Cladosporioides 1:10 w/v, HS 0.5 ml Diluent: HSA qs to 5ml    Allergic rhinitis due to mold       * ALLERGEN IMMUNOTHERAPY PRESCRIPTION     5 mL    Estuardo, White  GLY 1:20 w/v, HS  0.5 ml Birch Mix GLY 1:20 w/v, HS  0.5 ml Elm, American GLY 1:20 w/v, HS  0.5 ml Hackberry GLY 1:20 w/v, HS 0.5 ml Hickory, Shagbark GLY 1:20 w/v, HS  0.5 ml Melrose Mix GLY 1:20 w/v, HS 0.5 ml Oak Mix RVW GLY 1:20 w/v, HS 0.5 ml [place] Tree, Black GLY 1:20 w/v, HS 0.5 ml [place], Black GLY 1:20 w/v, HS 0.5 ml Diluent: HSA qs to 5ml    Seasonal allergic rhinitis due to pollen       * ALLERGEN IMMUNOTHERAPY PRESCRIPTION     5 mL    Kochia GLY 1:20 w/v, HS 1.0 ml Nettle GLY 1:20 w/v, HS 1.0 ml Plantain, English GLY 1:20 w/v, HS 1.0 ml Ragweed Mixed 1:20 w/v ALK  0.6 ml Russian Thistle GLY 1:20 w/v, HS 1.0 ml Diluent: HSA qs to 5ml    Seasonal allergic rhinitis due to pollen       BENADRYL PO      Take 50 mg by mouth nightly as needed for allergies or sleep        cetirizine 10 MG tablet    zyrTEC    30 tablet    Take 1 tablet (10 mg) by mouth every evening    Chronic seasonal allergic rhinitis due to pollen       EPINEPHrine 0.3 MG/0.3ML injection 2-pack    EPIPEN 2-ODETTE    0.6 mL    Inject 0.3 mLs (0.3 mg) into the muscle once as needed for anaphylaxis    Need for desensitization to allergens       FLONASE 50 MCG/ACT spray   Generic drug:  fluticasone     16 g    Spray 1-2 sprays into both nostrils daily    Seasonal allergic rhinitis, unspecified allergic rhinitis trigger       lamoTRIgine 200 MG tablet    LaMICtal     Take 200 mg by mouth every morning        permethrin 1 % Liqd     60 mL    Apply to clean, towel-dried hair, saturate hair and scalp, wash off after 10 min.    Lice       ziprasidone  40 MG capsule    GEODON     Take 160 mg by mouth At Bedtime        * Notice:  This list has 4 medication(s) that are the same as other medications prescribed for you. Read the directions carefully, and ask your doctor or other care provider to review them with you.

## 2017-07-20 ENCOUNTER — TELEPHONE (OUTPATIENT)
Dept: FAMILY MEDICINE | Facility: CLINIC | Age: 32
End: 2017-07-20

## 2017-07-20 ENCOUNTER — OFFICE VISIT (OUTPATIENT)
Dept: FAMILY MEDICINE | Facility: CLINIC | Age: 32
End: 2017-07-20
Payer: COMMERCIAL

## 2017-07-20 VITALS
HEIGHT: 65 IN | BODY MASS INDEX: 32.42 KG/M2 | TEMPERATURE: 99 F | SYSTOLIC BLOOD PRESSURE: 122 MMHG | DIASTOLIC BLOOD PRESSURE: 82 MMHG | HEART RATE: 106 BPM | WEIGHT: 194.6 LBS

## 2017-07-20 DIAGNOSIS — R00.2 PALPITATIONS: Primary | ICD-10-CM

## 2017-07-20 DIAGNOSIS — R94.31 HOLTER MONITOR, ABNORMAL: ICD-10-CM

## 2017-07-20 DIAGNOSIS — M53.3 SACRAL BACK PAIN: Primary | ICD-10-CM

## 2017-07-20 PROCEDURE — 99213 OFFICE O/P EST LOW 20 MIN: CPT | Performed by: NURSE PRACTITIONER

## 2017-07-20 RX ORDER — CYCLOBENZAPRINE HCL 10 MG
5-10 TABLET ORAL AT BEDTIME
Qty: 30 TABLET | Refills: 0 | Status: SHIPPED | OUTPATIENT
Start: 2017-07-20 | End: 2018-05-15

## 2017-07-20 RX ORDER — CYCLOBENZAPRINE HCL 10 MG
5-10 TABLET ORAL AT BEDTIME
Qty: 30 TABLET | Refills: 0 | Status: SHIPPED | OUTPATIENT
Start: 2017-07-20 | End: 2017-07-20

## 2017-07-20 NOTE — PATIENT INSTRUCTIONS
physical therapy   Flexeril 0.5-1 tablet daily at bedtime as needed   Capsaicin cream as needed every 6 hrs for pain  Aspercreme as needed for pain   Naproxen 220 mg twice daily as needed

## 2017-07-20 NOTE — TELEPHONE ENCOUNTER
Reason for Call:  Other head lice     Detailed comments: pt is calling wanting to know if she can do a phone visit for head lice or be treated over the phone?     Phone Number Patient can be reached at: Home number on file [phone] (home)    Best Time: any     Can we leave a detailed message on this number? YES    Call taken on 7/20/2017 at 4:37 PM by Margarita Loaiza

## 2017-07-20 NOTE — MR AVS SNAPSHOT
"              After Visit Summary   7/20/2017    Maddy Ortiz    MRN: [id number]           Patient Information     Date Of Birth          1985        Visit Information        Provider Department      7/20/2017 1:00 PM Mary Anne Tello APRN [facility]        Today's Diagnoses     Sacral back pain    -  1      Care Instructions    physical therapy   Flexeril 0.5-1 tablet daily at bedtime as needed   Capsaicin cream as needed every 6 hrs for pain  Aspercreme as needed for pain   Naproxen 220 mg twice daily as needed               Follow-ups after your visit        Additional Services     PHYSICAL THERAPY REFERRAL       *This therapy referral will be filtered to a centralized scheduling office at [facility] and the patient will receive a call to schedule an appointment at a [facility] location most convenient for them. *     [facility] provides Physical Therapy evaluation and treatment and many specialty services across the [facility] system.  If requesting a specialty program, please choose from the list below.    If you have not heard from the scheduling office within 2 business days, please call [phone] for all locations, with the exception of [place], please call [phone].  Treatment: Evaluation & Treatment  Special Instructions/Modalities: none  Special Programs: None    Please be aware that coverage of these services is subject to the terms and limitations of your health insurance plan.  Call member services at your health plan with any benefit or coverage questions.      **Note to Provider:  If you are referring outside of [facility] for the therapy appointment, please list the name of the location in the \"special instructions\" above, print the referral and give to the patient to schedule the appointment.                  Your next 10 appointments already scheduled     Aug 01, 2017  1:00 PM CDT   New Visit with Shilo Geiger, " "MD   [facility] PHYSICIAN HEART AT [facility])    [address]  [place] [facility] [place] [address] [phone]              Who to contact     If you have questions or need follow up information about today's clinic visit or your schedule please contact [facility] directly at [phone].  Normal or non-critical lab and imaging results will be communicated to you by MyChart, letter or phone within 4 business days after the clinic has received the results. If you do not hear from us within 7 days, please contact the clinic through MyChart or phone. If you have a critical or abnormal lab result, we will notify you by phone as soon as possible.  Submit refill requests through Interactive Advisory Software or call your pharmacy and they will forward the refill request to us. Please allow 3 business days for your refill to be completed.          Additional Information About Your Visit        Interactive Advisory Software Information     Interactive Advisory Software lets you send messages to your doctor, view your test results, renew your prescriptions, schedule appointments and more. To sign up, go to [email or web address] . Click on \"Log in\" on the left side of the screen, which will take you to the Welcome page. Then click on \"Sign up Now\" on the right side of the page.     You will be asked to enter the access code listed below, as well as some personal information. Please follow the directions to create your username and password.     Your access code is: [email or web address]  Expires: 10/18/2017  1:19 PM     Your access code will  in 90 days. If you need help or a new code, please call your [facility] or [phone].        Care EveryWhere ID     This is your Care EveryWhere ID. This could be used by other organizations to access your [place] medical records  [id number]        Your Vitals Were     Pulse Temperature Height BMI (Body Mass Index)          106 99  F (37.2  C) (Tympanic) 5' 4.92\" (1.649 m) 32.46 kg/m2         " Blood Pressure from Last 3 Encounters:   07/20/17 122/82   07/07/17 119/83   06/13/17 132/87    Weight from Last 3 Encounters:   07/20/17 194 lb 9.6 oz (88.3 kg)   07/07/17 192 lb 12.8 oz (87.5 kg)   06/13/17 192 lb (87.1 kg)              We Performed the Following     PHYSICAL THERAPY REFERRAL          Today's Medication Changes          These changes are accurate as of: 7/20/17  1:19 PM.  If you have any questions, ask your nurse or doctor.               Start taking these medicines.        Dose/Directions    cyclobenzaprine 10 MG tablet   Commonly known as:  FLEXERIL   Used for:  Sacral back pain   Started by:  Mary Anne Tello APRN CNP        Dose:  5-10 mg   Take 0.5-1 tablets (5-10 mg) by mouth At Bedtime   Quantity:  30 tablet   Refills:  0            Where to get your medicines      These medications were sent to [facility] [address] [place] [address] [address]   [address] [facility] [address]     Phone:  [phone]     cyclobenzaprine 10 MG tablet                Primary Care Provider Office Phone #    [facility] [phone] [address] [facility] [address]        Equal Access to Services     ARMOND MCKEON AH: Kamryn robbinso Soomaali, waaxda luqadaha, qaybta kaalmada adeegyada, lincoln fernandez. So [facility] [phone].    ATENCIÓN: Si habla español, tiene a titus disposición servicios gratuitos de asistencia lingüística. Yadira al [phone].    We comply with applicable federal civil rights laws and [place] laws. We do not discriminate on the basis of race, color, national origin, age, disability sex, sexual orientation or gender identity.            Thank you!     Thank you for choosing [facility]  for your care. Our goal is always to provide you with excellent care. Hearing back from our patients is one way we can continue to improve our services. Please take a few minutes to complete the written  survey that you may receive in the mail after your visit with us. Thank you!             Your Updated Medication List - Protect others around you: Learn how to safely use, store and throw away your medicines at www.disposemymeds.org.          This list is accurate as of: 7/20/17  1:19 PM.  Always use your most recent med list.                   Brand Name Dispense Instructions for use Diagnosis    * ALLERGEN IMMUNOTHERAPY PRESCRIPTION     5 mL    Cat Hair, Standardized 10,000 BAU/mL, ALK  3.0 ml Dog Hair Dander, A. P.  1:100 w/v, HS  1.0 ml Dust Mites F 30,000AU/mL, HS  0.5 ml Dust Mites P. 30,000 AU/mL, HS  0.5 ml  Diluent: HSA qs to 5ml    Allergic rhinitis due to animal dander, Allergic rhinitis due to dust mite       * ALLERGEN IMMUNOTHERAPY PRESCRIPTION     5 mL    Alternaria Tenuis GLY 1:10 w/v, HS  0.5 ml Epicoccum Nigrum 1:10 w/v, HS 0.5 ml Hormodendrum Cladosporioides 1:10 w/v, HS 0.5 ml Diluent: HSA qs to 5ml    Allergic rhinitis due to mold       * ALLERGEN IMMUNOTHERAPY PRESCRIPTION     5 mL    Estuardo, White  GLY 1:20 w/v, HS  0.5 ml Birch Mix GLY 1:20 w/v, HS  0.5 ml Elm, American GLY 1:20 w/v, HS  0.5 ml Hackberry GLY 1:20 w/v, HS 0.5 ml Hickory, Shagbark GLY 1:20 w/v, HS  0.5 ml [place] Mix GLY 1:20 w/v, HS 0.5 ml Oak Mix RVW GLY 1:20 w/v, HS 0.5 ml [place] Tree, Black GLY 1:20 w/v, HS 0.5 ml [place], Black GLY 1:20 w/v, HS 0.5 ml Diluent: HSA qs to 5ml    Seasonal allergic rhinitis due to pollen       * ALLERGEN IMMUNOTHERAPY PRESCRIPTION     5 mL    Kochia GLY 1:20 w/v, HS 1.0 ml Nettle GLY 1:20 w/v, HS 1.0 ml Plantain, English GLY 1:20 w/v, HS 1.0 ml Ragweed Mixed 1:20 w/v ALK  0.6 ml Russian Thistle GLY 1:20 w/v, HS 1.0 ml Diluent: HSA qs to 5ml    Seasonal allergic rhinitis due to pollen       BENADRYL PO      Take 50 mg by mouth nightly as needed for allergies or sleep        cetirizine 10 MG tablet    zyrTEC    30 tablet    Take 1 tablet (10 mg) by mouth every evening    Chronic seasonal allergic  rhinitis due to pollen       cyclobenzaprine 10 MG tablet    FLEXERIL    30 tablet    Take 0.5-1 tablets (5-10 mg) by mouth At Bedtime    Sacral back pain       EPINEPHrine 0.3 MG/0.3ML injection 2-pack    EPIPEN 2-ODETTE    0.6 mL    Inject 0.3 mLs (0.3 mg) into the muscle once as needed for anaphylaxis    Need for desensitization to allergens       FLONASE 50 MCG/ACT spray   Generic drug:  fluticasone     16 g    Spray 1-2 sprays into both nostrils daily    Seasonal allergic rhinitis, unspecified allergic rhinitis trigger       lamoTRIgine 200 MG tablet    LaMICtal     Take 200 mg by mouth every morning        permethrin 1 % Liqd     60 mL    Apply to clean, towel-dried hair, saturate hair and scalp, wash off after 10 min.    Lice       ziprasidone 40 MG capsule    GEODON     Take 160 mg by mouth At Bedtime        * Notice:  This list has 4 medication(s) that are the same as other medications prescribed for you. Read the directions carefully, and ask your doctor or other care provider to review them with you.

## 2017-07-20 NOTE — PROGRESS NOTES
"  SUBJECTIVE:                                                    Maddy Ortiz is a 31 year old female who presents to clinic today for the following health issues:  Tail bone pain , started about a year ago. States it hurts when he is sitting. No trauma, or injury. Denies abdominal pain, rectal pain, constipation, blood in stools, pregnancy.   Describes pain as dull ache.    Concern - Tail bone pain   Onset: one year, has been getting worse this month     Description:   Hurts when sits and when she gets out of a chair.     Intensity: moderate    Progression of Symptoms:  worsening    Accompanying Signs & Symptoms:  None     Previous history of similar problem:   None     Precipitating factors:   Worsened by: Sitting     Alleviating factors:  Improved by: None     Therapies Tried and outcome: None       Problem list and histories reviewed & adjusted, as indicated.  Additional history: as documented    Reviewed and updated as needed this visit by clinical staffTobacco  Allergies  Med Hx  Surg Hx  Fam Hx  Soc Hx      Reviewed and updated as needed this visit by Provider         ROS:  Constitutional, HEENT, cardiovascular, pulmonary, gi and gu systems are negative, except as otherwise noted.      OBJECTIVE:   /82  Pulse 106  Temp 99  F (37.2  C) (Tympanic)  Ht 5' 4.92\" (1.649 m)  Wt 194 lb 9.6 oz (88.3 kg)  BMI 32.46 kg/m2  Body mass index is 32.46 kg/(m^2).  GENERAL: healthy, alert and no distress  MS: no gross musculoskeletal defects noted, no edema  NEURO: Normal strength and tone, mentation intact and speech normal  BACK: no CVA tenderness, no paralumbar tenderness  Comprehensive back pain exam:  No tenderness, Range of motion not limited by pain, Lower extremity strength functional and equal on both sides, Lower extremity sensation normal and equal on both sides and Straight leg raise negative bilaterally  PSYCH: mentation appears normal, affect normal/bright    Diagnostic Test Results:  none "     ASSESSMENT/PLAN:     1. Sacral back pain  -unclear etiology, likely musculoskeletal, recommended to try physical therapy  -no imaging is indicated currently since there is no history of trauma.    - PHYSICAL THERAPY REFERRAL  - cyclobenzaprine (FLEXERIL) 10 MG tablet; Take 0.5-1 tablets (5-10 mg) by mouth At Bedtime  Dispense: 30 tablet; Refill: 0  -Naproxen 220 mg twice daily as needed  -Lidocaine cream twice daily as needed     See Patient Instructions    PHILL Freed [facility]

## 2017-07-20 NOTE — NURSING NOTE
"Chief Complaint   Patient presents with     Tailbone Pain     Hurts when she sits and only when she gets out of a chair.       Initial /82  Pulse 106  Temp 99  F (37.2  C) (Tympanic)  Ht 5' 4.92\" (1.649 m)  Wt 194 lb 9.6 oz (88.3 kg)  BMI 32.46 kg/m2 Estimated body mass index is 32.46 kg/(m^2) as calculated from the following:    Height as of this encounter: 5' 4.92\" (1.649 m).    Weight as of this encounter: 194 lb 9.6 oz (88.3 kg).  Medication Reconciliation: complete  "

## 2017-07-21 ENCOUNTER — TELEPHONE (OUTPATIENT)
Dept: FAMILY MEDICINE | Facility: CLINIC | Age: 32
End: 2017-07-21

## 2017-07-21 ENCOUNTER — VIRTUAL VISIT (OUTPATIENT)
Dept: FAMILY MEDICINE | Facility: CLINIC | Age: 32
End: 2017-07-21
Payer: COMMERCIAL

## 2017-07-21 DIAGNOSIS — B85.0 HEAD LICE: Primary | ICD-10-CM

## 2017-07-21 PROCEDURE — 99207 ZZC NO CHARGE NURSE ONLY: CPT | Mod: 25 | Performed by: NURSE PRACTITIONER

## 2017-07-21 RX ORDER — IVERMECTIN 10 MG/G
CREAM TOPICAL
Qty: 45 G | Refills: 0 | Status: SHIPPED | OUTPATIENT
Start: 2017-07-21 | End: 2017-07-21

## 2017-07-21 NOTE — PROGRESS NOTES
Patient was seen yesterday, reported head lice to [facility], not provider. Requesting prescription for lice. Sent Permethrin lotion.       Sig: Prior to application, wash hair with conditioner-free shampoo; rinse with water and towel dry. Apply a sufficient amount of lotion or cream rinse to saturate the hair and scalp (especially behind the ears and nape of neck). Leave on hair for no longer than 10 minutes, then rinse off with warm water; remove remaining nits with nit comb. A single application is generally sufficient; however may repeat 7 days after first treatment if lice or nits are still present.       PHILL Freed CNP

## 2017-07-21 NOTE — TELEPHONE ENCOUNTER
Called Patient to notify her she does not need a telephone encounter today as we saw her yesterday. Mary Anne sent the prescription to [facility].    Mari Brantley MA

## 2017-07-21 NOTE — MR AVS SNAPSHOT
"              After Visit Summary   7/21/2017    Maddy Ortiz    MRN: [id number]           Patient Information     Date Of Birth          1985        Visit Information        Provider Department      7/21/2017 11:00 AM Mary Anne Tello APRN CNP [facility]        Today's Diagnoses     Head lice    -  1       Follow-ups after your visit        Your next 10 appointments already scheduled     Jul 21, 2017 11:00 AM CDT   Telephone Visit with PHILL Arguello CNP   [facility]    [address] [id number] [phone]           Note: this is not an onsite visit; there is no need to come to the facility.            Aug 01, 2017  1:00 PM CDT   New Visit with Shilo Geiger MD   [facility] PHYSICIAN HEART AT [facility] ([facility])    [address] [address] [phone]              Who to contact     If you have questions or need follow up information about today's clinic visit or your schedule please contact [facility] directly at [phone].  Normal or non-critical lab and imaging results will be communicated to you by MyChart, letter or phone within 4 business days after the clinic has received the results. If you do not hear from us within 7 days, please contact the clinic through MyChart or phone. If you have a critical or abnormal lab result, we will notify you by phone as soon as possible.  Submit refill requests through EverPresent or call your pharmacy and they will forward the refill request to us. Please allow 3 business days for your refill to be completed.          Additional Information About Your Visit        MyChart Information     EverPresent lets you send messages to your doctor, view your test results, renew your prescriptions, schedule appointments and more. To sign up, go to [email or web address] . Click on \"Log in\" on the left side " "of the screen, which will take you to the Welcome page. Then click on \"Sign up Now\" on the right side of the page.     You will be asked to enter the access code listed below, as well as some personal information. Please follow the directions to create your username and password.     Your access code is: [email or web address]  Expires: 10/18/2017  1:19 PM     Your access code will  in 90 days. If you need help or a new code, please call your [facility] or [phone].        Care EveryWhere ID     This is your Care EveryWhere ID. This could be used by other organizations to access your [place] medical records  [id number]         Blood Pressure from Last 3 Encounters:   17 122/82   17 119/83   17 132/87    Weight from Last 3 Encounters:   17 194 lb 9.6 oz (88.3 kg)   17 192 lb 12.8 oz (87.5 kg)   17 192 lb (87.1 kg)              Today, you had the following     No orders found for display         Today's Medication Changes          These changes are accurate as of: 17  7:07 AM.  If you have any questions, ask your nurse or doctor.               These medicines have changed or have updated prescriptions.        Dose/Directions    * permethrin 1 % Liqd   This may have changed:  Another medication with the same name was added. Make sure you understand how and when to take each.   Used for:  Lice        Apply to clean, towel-dried hair, saturate hair and scalp, wash off after 10 min.   Quantity:  60 mL   Refills:  1       * permethrin 1 % Liqd   This may have changed:  You were already taking a medication with the same name, and this prescription was added. Make sure you understand how and when to take each.   Used for:  Head lice        Apply to clean, towel-dried hair, saturate hair and scalp, wash off after 10 min.   Quantity:  60 mL   Refills:  1       * Notice:  This list has 2 medication(s) that are the same as other medications prescribed for you. Read the directions " carefully, and ask your doctor or other care provider to review them with you.         Where to get your medicines      These medications were sent to [facility] - [place], [place] - [address]  [address] [place] [address]     Phone:  [phone]     permethrin 1 % Liqd                Primary Care Provider Office Phone #    [facility] [phone] [address] [address]        Equal Access to Services     ARMOND MCKEON : Hadii aad ku hadasho Soomaali, waaxda luqadaha, qaybta kaalmada adeegyada, waxay idiin hayaan adeeg kharash la'aan ah. So [facility] [phone].    ATENCIÓN: Si habla español, tiene a titus disposición servicios gratuitos de asistencia lingüística. Yadira al [phone].    We comply with applicable federal civil rights laws and [place] laws. We do not discriminate on the basis of race, color, national origin, age, disability sex, sexual orientation or gender identity.            Thank you!     Thank you for choosing [facility]  for your care. Our goal is always to provide you with excellent care. Hearing back from our patients is one way we can continue to improve our services. Please take a few minutes to complete the written survey that you may receive in the mail after your visit with us. Thank you!             Your Updated Medication List - Protect others around you: Learn how to safely use, store and throw away your medicines at www.disposemymeds.org.          This list is accurate as of: 7/21/17  7:07 AM.  Always use your most recent med list.                   Brand Name Dispense Instructions for use Diagnosis    * ALLERGEN IMMUNOTHERAPY PRESCRIPTION     5 mL    Cat Hair, Standardized 10,000 BAU/mL, ALK  3.0 ml Dog Hair Dander, A. P.  1:100 w/v, HS  1.0 ml Dust Mites F 30,000AU/mL, HS  0.5 ml Dust Mites P. 30,000 AU/mL, HS  0.5 ml  Diluent: HSA qs to 5ml    Allergic rhinitis due to animal dander,  Allergic rhinitis due to dust mite       * ALLERGEN IMMUNOTHERAPY PRESCRIPTION     5 mL    Alternaria Tenuis GLY 1:10 w/v, HS  0.5 ml Epicoccum Nigrum 1:10 w/v, HS 0.5 ml Hormodendrum Cladosporioides 1:10 w/v, HS 0.5 ml Diluent: HSA qs to 5ml    Allergic rhinitis due to mold       * ALLERGEN IMMUNOTHERAPY PRESCRIPTION     5 mL    Estuardo, White  GLY 1:20 w/v, HS  0.5 ml Birch Mix GLY 1:20 w/v, HS  0.5 ml Elm, American GLY 1:20 w/v, HS  0.5 ml Hackberry GLY 1:20 w/v, HS 0.5 ml Hickory, Shagbark GLY 1:20 w/v, HS  0.5 ml [place] Mix GLY 1:20 w/v, HS 0.5 ml Oak Mix RVW GLY 1:20 w/v, HS 0.5 ml [place] Tree, Black GLY 1:20 w/v, HS 0.5 ml [place], Black GLY 1:20 w/v, HS 0.5 ml Diluent: HSA qs to 5ml    Seasonal allergic rhinitis due to pollen       * ALLERGEN IMMUNOTHERAPY PRESCRIPTION     5 mL    Kochia GLY 1:20 w/v, HS 1.0 ml Nettle GLY 1:20 w/v, HS 1.0 ml Plantain, English GLY 1:20 w/v, HS 1.0 ml Ragweed Mixed 1:20 w/v ALK  0.6 ml Russian Thistle GLY 1:20 w/v, HS 1.0 ml Diluent: HSA qs to 5ml    Seasonal allergic rhinitis due to pollen       BENADRYL PO      Take 50 mg by mouth nightly as needed for allergies or sleep        cetirizine 10 MG tablet    zyrTEC    30 tablet    Take 1 tablet (10 mg) by mouth every evening    Chronic seasonal allergic rhinitis due to pollen       cyclobenzaprine 10 MG tablet    FLEXERIL    30 tablet    Take 0.5-1 tablets (5-10 mg) by mouth At Bedtime    Sacral back pain       EPINEPHrine 0.3 MG/0.3ML injection 2-pack    EPIPEN 2-ODETTE    0.6 mL    Inject 0.3 mLs (0.3 mg) into the muscle once as needed for anaphylaxis    Need for desensitization to allergens       FLONASE 50 MCG/ACT spray   Generic drug:  fluticasone     16 g    Spray 1-2 sprays into both nostrils daily    Seasonal allergic rhinitis, unspecified allergic rhinitis trigger       lamoTRIgine 200 MG tablet    LaMICtal     Take 200 mg by mouth every morning        * permethrin 1 % Liqd     60 mL    Apply to clean, towel-dried hair,  saturate hair and scalp, wash off after 10 min.    Lice       * permethrin 1 % Liqd     60 mL    Apply to clean, towel-dried hair, saturate hair and scalp, wash off after 10 min.    Head lice       ziprasidone 40 MG capsule    GEODON     Take 160 mg by mouth At Bedtime        * Notice:  This list has 6 medication(s) that are the same as other medications prescribed for you. Read the directions carefully, and ask your doctor or other care provider to review them with you.

## 2017-07-25 ENCOUNTER — TELEPHONE (OUTPATIENT)
Dept: FAMILY MEDICINE | Facility: CLINIC | Age: 32
End: 2017-07-25

## 2017-07-26 ENCOUNTER — ALLIED HEALTH/NURSE VISIT (OUTPATIENT)
Dept: ALLERGY | Facility: CLINIC | Age: 32
End: 2017-07-26
Payer: COMMERCIAL

## 2017-07-26 DIAGNOSIS — J30.9 ALLERGIC RHINITIS, UNSPECIFIED: Primary | ICD-10-CM

## 2017-07-26 PROCEDURE — 95117 IMMUNOTHERAPY INJECTIONS: CPT

## 2017-07-26 PROCEDURE — 99207 ZZC NO CHARGE LOS: CPT

## 2017-07-26 NOTE — MR AVS SNAPSHOT
"              After Visit Summary   7/26/2017    Maddy Ortiz    MRN: [id number]           Patient Information     Date Of Birth          1985        Visit Information        Provider Department      7/26/2017 10:45 AM ALLERGY MA - [facility]        Today's Diagnoses     Allergic rhinitis, unspecified    -  1       Follow-ups after your visit        Your next 10 appointments already scheduled     Aug 01, 2017  1:00 PM CDT   New Visit with Shilo Geiger MD   [facility] PHYSICIAN HEART AT [facility] ([facility])    [address] [id number] [phone]            Aug 03, 2017  1:30 PM CDT   Women's Health Eval with Renee Sutton PT   [facility] ([facility])    [address]  [address]  [place] [id number] [phone]              Who to contact     If you have questions or need follow up information about today's clinic visit or your schedule please contact [facility] directly at [phone].  Normal or non-critical lab and imaging results will be communicated to you by Precoghart, letter or phone within 4 business days after the clinic has received the results. If you do not hear from us within 7 days, please contact the clinic through Precoghart or phone. If you have a critical or abnormal lab result, we will notify you by phone as soon as possible.  Submit refill requests through S[employer] or call your pharmacy and they will forward the refill request to us. Please allow 3 business days for your refill to be completed.          Additional Information About Your Visit        MyChart Information     [employer] lets you send messages to your doctor, view your test results, renew your prescriptions, schedule appointments and more. To sign up, go to [email or web address] . Click on \"Log in\" on the left side of the screen, which will take you to the Welcome page. Then click on " "\"Sign up Now\" on the right side of the page.     You will be asked to enter the access code listed below, as well as some personal information. Please follow the directions to create your username and password.     Your access code is: [email or web address]  Expires: 10/18/2017  1:19 PM     Your access code will  in 90 days. If you need help or a new code, please call your [facility] or [phone].        Care EveryWhere ID     This is your Care EveryWhere ID. This could be used by other organizations to access your [place] medical records  [id number]         Blood Pressure from Last 3 Encounters:   17 122/82   17 119/83   17 132/87    Weight from Last 3 Encounters:   17 194 lb 9.6 oz (88.3 kg)   17 192 lb 12.8 oz (87.5 kg)   17 192 lb (87.1 kg)              We Performed the Following     Allergy Shot: Two or more injections        Primary Care Provider Office Phone #    [facility] [phone] [address] [address]        Equal Access to Services     ARMOND MCKEON AH: Hadii linda ku itzelo Soradha, waaxda luqadaha, qaybta kaalmada adeegyada, lincoln fernandez. So [facility] [phone].    ATENCIÓN: Si habla español, tiene a titus disposición servicios gratuitos de asistencia lingüística. Yadira al [phone].    We comply with applicable federal civil rights laws and [place] laws. We do not discriminate on the basis of race, color, national origin, age, disability sex, sexual orientation or gender identity.            Thank you!     Thank you for choosing [facility]  for your care. Our goal is always to provide you with excellent care. Hearing back from our patients is one way we can continue to improve our services. Please take a few minutes to complete the written survey that you may receive in the mail after your visit with us. Thank you!             Your Updated Medication List - Protect " others around you: Learn how to safely use, store and throw away your medicines at www.disposemymeds.org.          This list is accurate as of: 7/26/17 11:29 AM.  Always use your most recent med list.                   Brand Name Dispense Instructions for use Diagnosis    * ALLERGEN IMMUNOTHERAPY PRESCRIPTION     5 mL    Cat Hair, Standardized 10,000 BAU/mL, ALK  3.0 ml Dog Hair Dander, A. P.  1:100 w/v, HS  1.0 ml Dust Mites F 30,000AU/mL, HS  0.5 ml Dust Mites P. 30,000 AU/mL, HS  0.5 ml  Diluent: HSA qs to 5ml    Allergic rhinitis due to animal dander, Allergic rhinitis due to dust mite       * ALLERGEN IMMUNOTHERAPY PRESCRIPTION     5 mL    Alternaria Tenuis GLY 1:10 w/v, HS  0.5 ml Epicoccum Nigrum 1:10 w/v, HS 0.5 ml Hormodendrum Cladosporioides 1:10 w/v, HS 0.5 ml Diluent: HSA qs to 5ml    Allergic rhinitis due to mold       * ALLERGEN IMMUNOTHERAPY PRESCRIPTION     5 mL    Estuardo, White  GLY 1:20 w/v, HS  0.5 ml Birch Mix GLY 1:20 w/v, HS  0.5 ml Elm, American GLY 1:20 w/v, HS  0.5 ml Hackberry GLY 1:20 w/v, HS 0.5 ml Hickory, Shagbark GLY 1:20 w/v, HS  0.5 ml [place] Mix GLY 1:20 w/v, HS 0.5 ml Oak Mix RVW GLY 1:20 w/v, HS 0.5 ml [place] Tree, Black GLY 1:20 w/v, HS 0.5 ml [place], Black GLY 1:20 w/v, HS 0.5 ml Diluent: HSA qs to 5ml    Seasonal allergic rhinitis due to pollen       * ALLERGEN IMMUNOTHERAPY PRESCRIPTION     5 mL    Kochia GLY 1:20 w/v, HS 1.0 ml Nettle GLY 1:20 w/v, HS 1.0 ml Plantain, English GLY 1:20 w/v, HS 1.0 ml Ragweed Mixed 1:20 w/v ALK  0.6 ml Russian Thistle GLY 1:20 w/v, HS 1.0 ml Diluent: HSA qs to 5ml    Seasonal allergic rhinitis due to pollen       BENADRYL PO      Take 50 mg by mouth nightly as needed for allergies or sleep        cetirizine 10 MG tablet    zyrTEC    30 tablet    Take 1 tablet (10 mg) by mouth every evening    Chronic seasonal allergic rhinitis due to pollen       cyclobenzaprine 10 MG tablet    FLEXERIL    30 tablet    Take 0.5-1 tablets (5-10 mg) by mouth At  Bedtime    Sacral back pain       EPINEPHrine 0.3 MG/0.3ML injection 2-pack    EPIPEN 2-ODETTE    0.6 mL    Inject 0.3 mLs (0.3 mg) into the muscle once as needed for anaphylaxis    Need for desensitization to allergens       FLONASE 50 MCG/ACT spray   Generic drug:  fluticasone     16 g    Spray 1-2 sprays into both nostrils daily    Seasonal allergic rhinitis, unspecified allergic rhinitis trigger       lamoTRIgine 200 MG tablet    LaMICtal     Take 200 mg by mouth every morning        * permethrin 1 % Liqd     60 mL    Apply to clean, towel-dried hair, saturate hair and scalp, wash off after 10 min.    Lice       * permethrin 1 % Liqd     60 mL    Apply to clean, towel-dried hair, saturate hair and scalp, wash off after 10 min.    Head lice       ziprasidone 40 MG capsule    GEODON     Take 160 mg by mouth At Bedtime        * Notice:  This list has 6 medication(s) that are the same as other medications prescribed for you. Read the directions carefully, and ask your doctor or other care provider to review them with you.

## 2017-07-26 NOTE — PROGRESS NOTES
Patient presented after waiting 30 minutes with no reaction to  injections. Discharged from clinic.    Callie VELASQUEZ RN  Specialty [facility]

## 2017-07-26 NOTE — TELEPHONE ENCOUNTER
PA for soolantra 1% completed at cover my med and faxed to [place] - will await response    Id number [id number]  [phone]

## 2017-07-27 NOTE — TELEPHONE ENCOUNTER
PA for soolantra has been denied by [place].  Patient has third party coverage and I believe BC/BS will need to make a payment first.  Pharmacy notified.

## 2017-08-01 ENCOUNTER — ALLIED HEALTH/NURSE VISIT (OUTPATIENT)
Dept: ALLERGY | Facility: CLINIC | Age: 32
End: 2017-08-01
Payer: COMMERCIAL

## 2017-08-01 ENCOUNTER — OFFICE VISIT (OUTPATIENT)
Dept: CARDIOLOGY | Facility: CLINIC | Age: 32
End: 2017-08-01
Attending: FAMILY MEDICINE
Payer: COMMERCIAL

## 2017-08-01 VITALS
SYSTOLIC BLOOD PRESSURE: 121 MMHG | DIASTOLIC BLOOD PRESSURE: 81 MMHG | WEIGHT: 195 LBS | OXYGEN SATURATION: 94 % | HEART RATE: 91 BPM | BODY MASS INDEX: 32.53 KG/M2

## 2017-08-01 DIAGNOSIS — R00.0 SINUS TACHYCARDIA: Primary | ICD-10-CM

## 2017-08-01 DIAGNOSIS — J30.9 ALLERGIC RHINITIS, UNSPECIFIED: Primary | ICD-10-CM

## 2017-08-01 PROCEDURE — 99207 ZZC NO CHARGE LOS: CPT

## 2017-08-01 PROCEDURE — 95117 IMMUNOTHERAPY INJECTIONS: CPT

## 2017-08-01 PROCEDURE — 99204 OFFICE O/P NEW MOD 45 MIN: CPT | Performed by: INTERNAL MEDICINE

## 2017-08-01 NOTE — MR AVS SNAPSHOT
After Visit Summary   8/1/2017    Maddy Ortiz    MRN: [id number]           Patient Information     Date Of Birth          1985        Visit Information        Provider Department      8/1/2017 1:00 PM Shilo Gieger MD [facility] PHYSICIAN HEART AT [facility]        Today's Diagnoses     Sinus tachycardia    -  1       Follow-ups after your visit        Your next 10 appointments already scheduled     Aug 01, 2017  2:00 PM CDT   Nurse Only with ALLERGY [facility] ([facility])    [address] [facility] [address]   [phone]           Every allergy patient MUST wait 30 minutes after their allergy shot. No exceptions.  Xolair shots #1-3 should plan to wait 2 hours in clinic Xolair shots after #4 should plan 30 minute wait in clinic            Aug 03, 2017  1:30 PM CDT   Women's Health Eval with Renee Sutton PT   [facility] ([facility])    [address]  [address]  [place] [id number] [phone]            Aug 08, 2017  1:00 PM CDT   Ech Complete with BIA   [facility] ([facility])    [address] [id number] [phone]           1. Please bring or wear a comfortable two-piece outfit. 2. You may eat, drink and take your normal medicines. 3. For any questions that cannot be answered, please contact the ordering physician              Future tests that were ordered for you today     Open Future Orders        Priority Expected Expires Ordered    Echocardiogram Routine 8/8/2017 8/1/2018 8/1/2017            Who to contact     If you have questions or need follow up information about today's clinic visit or your schedule please contact [facility] PHYSICIAN HEART AT [facility] directly at [phone].  Normal or non-critical lab and imaging results will be communicated to you by [facility]  "letter or phone within 4 business days after the clinic has received the results. If you do not hear from us within 7 days, please contact the clinic through [employer] or phone. If you have a critical or abnormal lab result, we will notify you by phone as soon as possible.  Submit refill requests through [employer] or call your pharmacy and they will forward the refill request to us. Please allow 3 business days for your refill to be completed.          Additional Information About Your Visit        [employer] Information     [employer] lets you send messages to your doctor, view your test results, renew your prescriptions, schedule appointments and more. To sign up, go to [email or web address] . Click on \"Log in\" on the left side of the screen, which will take you to the Welcome page. Then click on \"Sign up Now\" on the right side of the page.     You will be asked to enter the access code listed below, as well as some personal information. Please follow the directions to create your username and password.     Your access code is: [email or web address]  Expires: 10/18/2017  1:19 PM     Your access code will  in 90 days. If you need help or a new code, please call your [facility] clinic or [phone].        Care EveryWhere ID     This is your Care EveryWhere ID. This could be used by other organizations to access your [facility] medical records  [id number]        Your Vitals Were     Pulse Pulse Oximetry BMI (Body Mass Index)             91 94% 32.53 kg/m2          Blood Pressure from Last 3 Encounters:   17 121/81   17 122/82   17 119/83    Weight from Last 3 Encounters:   17 88.5 kg (195 lb)   17 88.3 kg (194 lb 9.6 oz)   17 87.5 kg (192 lb 12.8 oz)              We Performed the Following     TSH with free T4 reflex        Primary Care Provider Office Phone #    [facility] [phone] [address] [address]        Equal Access to Services  "    ARMOND MCKEON : Hadii aad ku verenice Sohubertali, waaxda luqadaha, qaybta kaalmada adeegyada, lincoln paulino catrachodaniela mikecarmelita alonsochele maciel . So [facility] [phone].    ATENCIÓN: Si elida west, tiene a titus disposición servicios gratuitos de asistencia lingüística. Llame al [phone].    We comply with applicable federal civil rights laws and [place] laws. We do not discriminate on the basis of race, color, national origin, age, disability sex, sexual orientation or gender identity.            Thank you!     Thank you for choosing [facility] PHYSICIAN HEART AT [facility]  for your care. Our goal is always to provide you with excellent care. Hearing back from our patients is one way we can continue to improve our services. Please take a few minutes to complete the written survey that you may receive in the mail after your visit with us. Thank you!             Your Updated Medication List - Protect others around you: Learn how to safely use, store and throw away your medicines at www.disposemymeds.org.          This list is accurate as of: 8/1/17  1:39 PM.  Always use your most recent med list.                   Brand Name Dispense Instructions for use Diagnosis    * ALLERGEN IMMUNOTHERAPY PRESCRIPTION     5 mL    Cat Hair, Standardized 10,000 BAU/mL, ALK  3.0 ml Dog Hair Dander, A. P.  1:100 w/v, HS  1.0 ml Dust Mites F 30,000AU/mL, HS  0.5 ml Dust Mites P. 30,000 AU/mL, HS  0.5 ml  Diluent: HSA qs to 5ml    Allergic rhinitis due to animal dander, Allergic rhinitis due to dust mite       * ALLERGEN IMMUNOTHERAPY PRESCRIPTION     5 mL    Alternaria Tenuis GLY 1:10 w/v, HS  0.5 ml Epicoccum Nigrum 1:10 w/v, HS 0.5 ml Hormodendrum Cladosporioides 1:10 w/v, HS 0.5 ml Diluent: HSA qs to 5ml    Allergic rhinitis due to mold       * ALLERGEN IMMUNOTHERAPY PRESCRIPTION     5 mL    Estuardo, White  GLY 1:20 w/v, HS  0.5 ml Birch Mix GLY 1:20 w/v, HS  0.5 ml Elm, American GLY 1:20 w/v, HS  0.5 ml Hackberry GLY 1:20  w/v, HS 0.5 ml Diamond, Shagbark GLY 1:20 w/v, HS  0.5 ml [place] Mix GLY 1:20 w/v, HS 0.5 ml Oak Mix RVW GLY 1:20 w/v, HS 0.5 ml [place] Tree, Black GLY 1:20 w/v, HS 0.5 ml [place], Black GLY 1:20 w/v, HS 0.5 ml Diluent: HSA qs to 5ml    Seasonal allergic rhinitis due to pollen       * ALLERGEN IMMUNOTHERAPY PRESCRIPTION     5 mL    Kochia GLY 1:20 w/v, HS 1.0 ml Nettle GLY 1:20 w/v, HS 1.0 ml Plantain, English GLY 1:20 w/v, HS 1.0 ml Ragweed Mixed 1:20 w/v ALK  0.6 ml Russian Thistle GLY 1:20 w/v, HS 1.0 ml Diluent: HSA qs to 5ml    Seasonal allergic rhinitis due to pollen       BENADRYL PO      Take 50 mg by mouth nightly as needed for allergies or sleep        cetirizine 10 MG tablet    zyrTEC    30 tablet    Take 1 tablet (10 mg) by mouth every evening    Chronic seasonal allergic rhinitis due to pollen       cyclobenzaprine 10 MG tablet    FLEXERIL    30 tablet    Take 0.5-1 tablets (5-10 mg) by mouth At Bedtime    Sacral back pain       EPINEPHrine 0.3 MG/0.3ML injection 2-pack    EPIPEN 2-ODETTE    0.6 mL    Inject 0.3 mLs (0.3 mg) into the muscle once as needed for anaphylaxis    Need for desensitization to allergens       FLONASE 50 MCG/ACT spray   Generic drug:  fluticasone     16 g    Spray 1-2 sprays into both nostrils daily    Seasonal allergic rhinitis, unspecified allergic rhinitis trigger       lamoTRIgine 200 MG tablet    LaMICtal     Take 200 mg by mouth every morning        * permethrin 1 % Liqd     60 mL    Apply to clean, towel-dried hair, saturate hair and scalp, wash off after 10 min.    Lice       * permethrin 1 % Liqd     60 mL    Apply to clean, towel-dried hair, saturate hair and scalp, wash off after 10 min.    Head lice       ziprasidone 40 MG capsule    GEODON     Take 160 mg by mouth At Bedtime        * Notice:  This list has 6 medication(s) that are the same as other medications prescribed for you. Read the directions carefully, and ask your doctor or other care provider to review them  with you.

## 2017-08-01 NOTE — LETTER
8/1/2017    Deni Fernandez MD  [facility]  [address] [place] [address]    RE: Maddy Ortiz       Dear Colleague,    I had the pleasure of seeing Maddy Ortiz in the [facility].    CARDIOLOGY CONSULT    REASON FOR CONSULT: tachycardia    PRIMARY CARE PHYSICIAN:  [facility]    HISTORY OF PRESENT ILLNESS:  31-year-old female with no cardiac history is seen for sinus tachycardia.     Patient reports intermittent palpitations dating back a few years.  Historically this happens about once per month.  She feels gradual onset of a strong and racing heart with some associated dizziness.  No history of syncope.  She has no chest pain.  This sometimes comes on after eating, but generally has no provoking factor.    She has been on Geodon for many years.  The dose was increased earlier this summer and her palpitation symptoms increased in frequency to about once per week.  She decreased the dose back to 80 mg and her palpitations seem to decrease.    48 hour Holter monitor, June 2017 showed sinus rhythm, average heart rate 95, minimum 60, maximum 147, daytime heart rate ranged from 100-115 on average, 80s at night.     PAST MEDICAL HISTORY:  Past Medical History:   Diagnosis Date     Bipolar 1 disorder (H) 12/6/2012     Paranoid type delusional disorder (H) 11/14/2012       MEDICATIONS:  Current Outpatient Prescriptions   Medication     permethrin 1 % LIQD     cyclobenzaprine (FLEXERIL) 10 MG tablet     permethrin 1 % LIQD     cetirizine (ZYRTEC) 10 MG tablet     DiphenhydrAMINE HCl (BENADRYL PO)     ORDER FOR ALLERGEN IMMUNOTHERAPY     ORDER FOR ALLERGEN IMMUNOTHERAPY     ORDER FOR ALLERGEN IMMUNOTHERAPY     ORDER FOR ALLERGEN IMMUNOTHERAPY     EPINEPHrine (EPIPEN 2-ODETTE) 0.3 MG/0.3ML injection     fluticasone (FLONASE) 50 MCG/ACT spray     ziprasidone (GEODON) 40 MG capsule     lamoTRIgine (LAMICTAL) 200 MG tablet     No current  facility-administered medications for this visit.        ALLERGIES:  Allergies   Allergen Reactions     Animal Dander      Nkda [No Known Drug Allergies]      Seasonal Allergies        SOCIAL HISTORY:  I have reviewed this patient's social history and updated it with pertinent information if needed. Maddy Ortiz  reports that she has never smoked. She has never used smokeless tobacco. She reports that she drinks alcohol. She reports that she does not use illicit drugs.    FAMILY HISTORY:  I have reviewed this patient's family history and updated it with pertinent information if needed.   Family History   Problem Relation Age of Onset     Adopted: Yes     Unknown/Adopted Mother      Unknown/Adopted Father      Unknown/Adopted Other        REVIEW OF SYSTEMS:  Constitutional:  No weight loss, fever, chills, weakness or fatigue.  HEENT:  Eyes:  No visual loss, blurred vision, double vision or yellow sclerae. No hearing loss, sneezing, congestion, runny nose or sore throat.  Skin:  No rash or itching.  Cardiovascular: per HPI  Respiratory: per HPI  GI:  No anorexia, nausea, vomiting or diarrhea. No abdominal pain or blood.  :  No dysurea, hematuria  Neurologic:  No headache, dizziness, syncope, paralysis, ataxia, numbness or tingling in the extremities. No change in bowel or bladder control.  Musculoskeletal:  No muscle, back pain, joint pain or stiffness.  Hematologic:  No anemia, bleeding or bruising.  Lymphatics:  No enlarged nodes. No history of splenectomy.  Psychiatric:  No history of depression or anxiety.  Endocrine:  No reports of sweating, cold or heat intolerance. No polyuria or polydipsia.  Allergies:  No history of asthma, hives, eczema or rhinitis.    PHYSICAL EXAM:  /81  Pulse 91  Wt 88.5 kg (195 lb)  SpO2 94%  BMI 32.53 kg/m2  Constitutional: awake, alert, no distress  Eyes: PERRL, sclera nonicteric  ENT: trachea midline  Respiratory: Lungs clear  Cardiovascular: Regular rate and rhythm, no  murmurs  GI: nondistended, nontender, bowel sounds present  Lymph/Hematologic: no lymphadenopathy  Skin: dry, no rash  Musculoskeletal: good muscle tone, strength 5/5 in upper and lower extremities  Neurologic: no focal deficits  Neuropsychiatric: appropriate affact    DATA:  Labs:   May 2017: White count 15, hemoglobin 13.7, potassium 3.4, creatinine 0.8     EKG May 31, 2017: Sinus rhythm, rate 98, very minor nonspecific ST changes     ASSESSMENT:  31-year-old female seen for evaluation of palpitations.  It seems she has sinus tachycardia with some occasional associated symptoms.  Suspect that this could be related to the Geodon.  There is a 1-2% incidence of tachycardia with this medication.  Her symptoms worsened when she was on a higher dose.    Echocardiogram will be done to ensure there is no underlying valve issue or cardiomyopathy.  Thyroid labs will be checked today also.  Otherwise she denies any significant caffeine use, stimulants, or other substances that would increase her heart rate.    She seeing her psychiatrist next week.  They will discuss whether or not Geodon could be stopped for some period of time or changed to a different medication.    RECOMMENDATIONS:  1.  Palpitations, likely sinus tachycardia, suspect medication related  - Echocardiogram  - TSH today  - She will discuss Geodon dosage and use with her psychiatrist next week  - If symptoms were to persist in the future or become more frequent, metoprolol could be tried    Follow up as needed.    Shilo Geiger MD  Cardiology - [facility]  Pager:  [phone]  Text Page  August 1, 2017    Thank you for allowing me to participate in the care of your patient.    Sincerely,     Shilo Geiger MD     [facility]

## 2017-08-01 NOTE — PROGRESS NOTES
CARDIOLOGY CONSULT    REASON FOR CONSULT: tachycardia    PRIMARY CARE PHYSICIAN:  [facility]    HISTORY OF PRESENT ILLNESS:  31-year-old female with no cardiac history is seen for sinus tachycardia.     Patient reports intermittent palpitations dating back a few years.  Historically this happens about once per month.  She feels gradual onset of a strong and racing heart with some associated dizziness.  No history of syncope.  She has no chest pain.  This sometimes comes on after eating, but generally has no provoking factor.    She has been on Geodon for many years.  The dose was increased earlier this summer and her palpitation symptoms increased in frequency to about once per week.  She decreased the dose back to 80 mg and her palpitations seem to decrease.    48 hour Holter monitor, June 2017 showed sinus rhythm, average heart rate 95, minimum 60, maximum 147, daytime heart rate ranged from 100-115 on average, 80s at night.     PAST MEDICAL HISTORY:  Past Medical History:   Diagnosis Date     Bipolar 1 disorder (H) 12/6/2012     Paranoid type delusional disorder (H) 11/14/2012       MEDICATIONS:  Current Outpatient Prescriptions   Medication     permethrin 1 % LIQD     cyclobenzaprine (FLEXERIL) 10 MG tablet     permethrin 1 % LIQD     cetirizine (ZYRTEC) 10 MG tablet     DiphenhydrAMINE HCl (BENADRYL PO)     ORDER FOR ALLERGEN IMMUNOTHERAPY     ORDER FOR ALLERGEN IMMUNOTHERAPY     ORDER FOR ALLERGEN IMMUNOTHERAPY     ORDER FOR ALLERGEN IMMUNOTHERAPY     EPINEPHrine (EPIPEN 2-ODETTE) 0.3 MG/0.3ML injection     fluticasone (FLONASE) 50 MCG/ACT spray     ziprasidone (GEODON) 40 MG capsule     lamoTRIgine (LAMICTAL) 200 MG tablet     No current facility-administered medications for this visit.        ALLERGIES:  Allergies   Allergen Reactions     Animal Dander      Nkda [No Known Drug Allergies]      Seasonal Allergies        SOCIAL HISTORY:  I have reviewed this patient's social history and updated  it with pertinent information if needed. Maddy Ortiz  reports that she has never smoked. She has never used smokeless tobacco. She reports that she drinks alcohol. She reports that she does not use illicit drugs.    FAMILY HISTORY:  I have reviewed this patient's family history and updated it with pertinent information if needed.   Family History   Problem Relation Age of Onset     Adopted: Yes     Unknown/Adopted Mother      Unknown/Adopted Father      Unknown/Adopted Other        REVIEW OF SYSTEMS:  Constitutional:  No weight loss, fever, chills, weakness or fatigue.  HEENT:  Eyes:  No visual loss, blurred vision, double vision or yellow sclerae. No hearing loss, sneezing, congestion, runny nose or sore throat.  Skin:  No rash or itching.  Cardiovascular: per HPI  Respiratory: per HPI  GI:  No anorexia, nausea, vomiting or diarrhea. No abdominal pain or blood.  :  No dysurea, hematuria  Neurologic:  No headache, dizziness, syncope, paralysis, ataxia, numbness or tingling in the extremities. No change in bowel or bladder control.  Musculoskeletal:  No muscle, back pain, joint pain or stiffness.  Hematologic:  No anemia, bleeding or bruising.  Lymphatics:  No enlarged nodes. No history of splenectomy.  Psychiatric:  No history of depression or anxiety.  Endocrine:  No reports of sweating, cold or heat intolerance. No polyuria or polydipsia.  Allergies:  No history of asthma, hives, eczema or rhinitis.    PHYSICAL EXAM:  /81  Pulse 91  Wt 88.5 kg (195 lb)  SpO2 94%  BMI 32.53 kg/m2  Constitutional: awake, alert, no distress  Eyes: PERRL, sclera nonicteric  ENT: trachea midline  Respiratory: Lungs clear  Cardiovascular: Regular rate and rhythm, no murmurs  GI: nondistended, nontender, bowel sounds present  Lymph/Hematologic: no lymphadenopathy  Skin: dry, no rash  Musculoskeletal: good muscle tone, strength 5/5 in upper and lower extremities  Neurologic: no focal deficits  Neuropsychiatric: appropriate  affact    DATA:  Labs:   May 2017: White count 15, hemoglobin 13.7, potassium 3.4, creatinine 0.8     EKG May 31, 2017: Sinus rhythm, rate 98, very minor nonspecific ST changes     ASSESSMENT:  31-year-old female seen for evaluation of palpitations.  It seems she has sinus tachycardia with some occasional associated symptoms.  Suspect that this could be related to the Geodon.  There is a 1-2% incidence of tachycardia with this medication.  Her symptoms worsened when she was on a higher dose.    Echocardiogram will be done to ensure there is no underlying valve issue or cardiomyopathy.  Thyroid labs will be checked today also.  Otherwise she denies any significant caffeine use, stimulants, or other substances that would increase her heart rate.    She seeing her psychiatrist next week.  They will discuss whether or not Geodon could be stopped for some period of time or changed to a different medication.    RECOMMENDATIONS:  1.  Palpitations, likely sinus tachycardia, suspect medication related  - Echocardiogram  - TSH today  - She will discuss Geodon dosage and use with her psychiatrist next week  - If symptoms were to persist in the future or become more frequent, metoprolol could be tried    Follow up as needed.    Shilo Geiger MD  Cardiology - [facility]  Pager:  [phone]  Text Page  August 1, 2017

## 2017-08-01 NOTE — MR AVS SNAPSHOT
After Visit Summary   8/1/2017    Maddy Ortiz    MRN: [id number]           Patient Information     Date Of Birth          1985        Visit Information        Provider Department      8/1/2017 2:00 PM ALLERGY [place] - [facility]        Today's Diagnoses     Allergic rhinitis, unspecified    -  1       Follow-ups after your visit        Your next 10 appointments already scheduled     Aug 03, 2017  1:30 PM CDT   Women's Health Eval with Renee Sutton, PT   [facility] Physical Therapy ([facility])    [address]  [address]  [facility] [id number] [phone]            Aug 08, 2017  1:00 PM CDT   Ech Complete with BIA   [facility] Echocardiography ([facility])    [address]  [facility] [id number] [phone]           1. Please bring or wear a comfortable two-piece outfit. 2. You may eat, drink and take your normal medicines. 3. For any questions that cannot be answered, please contact the ordering physician              Future tests that were ordered for you today     Open Future Orders        Priority Expected Expires Ordered    Echocardiogram Routine 8/8/2017 8/1/2018 8/1/2017            Who to contact     If you have questions or need follow up information about today's clinic visit or your schedule please contact [facility] directly at [phone].  Normal or non-critical lab and imaging results will be communicated to you by MyChart, letter or phone within 4 business days after the clinic has received the results. If you do not hear from us within 7 days, please contact the clinic through MyChart or phone. If you have a critical or abnormal lab result, we will notify you by phone as soon as possible.  Submit refill requests through [employer] or call your pharmacy and they will forward the refill request to us. Please allow 3 business days for your refill to be completed.          Additional  "Information About Your Visit        MyChart Information     [employer] lets you send messages to your doctor, view your test results, renew your prescriptions, schedule appointments and more. To sign up, go to [email or web address] . Click on \"Log in\" on the left side of the screen, which will take you to the Welcome page. Then click on \"Sign up Now\" on the right side of the page.     You will be asked to enter the access code listed below, as well as some personal information. Please follow the directions to create your username and password.     Your access code is: [email or web address]  Expires: 10/18/2017  1:19 PM     Your access code will  in 90 days. If you need help or a new code, please call your [facility] clinic or [phone].        Care EveryWhere ID     This is your Care EveryWhere ID. This could be used by other organizations to access your [facility] medical records  [id number]         Blood Pressure from Last 3 Encounters:   17 121/81   17 122/82   17 119/83    Weight from Last 3 Encounters:   17 195 lb (88.5 kg)   17 194 lb 9.6 oz (88.3 kg)   17 192 lb 12.8 oz (87.5 kg)              We Performed the Following     Allergy Shot: Two or more injections        Primary Care Provider Office Phone #    [facility] [phone] [address] [facility] [phone]        Equal Access to Services     ARMOND MCKEON : Hadii aad ku hadasho Soomaali, waaxda luqadaha, qaybta kaalmada adeegyada, waxay paulino fernandez. So [facility] [phone].    ATENCIÓN: Si habla español, tiene a titus disposición servicios gratuitos de asistencia lingüística. Llame al [phone].    We comply with applicable federal civil rights laws and [place] laws. We do not discriminate on the basis of race, color, national origin, age, disability sex, sexual orientation or gender identity.            Thank you!     Thank you for choosing [facility] " [place]  for your care. Our goal is always to provide you with excellent care. Hearing back from our patients is one way we can continue to improve our services. Please take a few minutes to complete the written survey that you may receive in the mail after your visit with us. Thank you!             Your Updated Medication List - Protect others around you: Learn how to safely use, store and throw away your medicines at www.disposemymeds.org.          This list is accurate as of: 8/1/17  2:03 PM.  Always use your most recent med list.                   Brand Name Dispense Instructions for use Diagnosis    * ALLERGEN IMMUNOTHERAPY PRESCRIPTION     5 mL    Cat Hair, Standardized 10,000 BAU/mL, ALK  3.0 ml Dog Hair Dander, A. P.  1:100 w/v, HS  1.0 ml Dust Mites F 30,000AU/mL, HS  0.5 ml Dust Mites P. 30,000 AU/mL, HS  0.5 ml  Diluent: HSA qs to 5ml    Allergic rhinitis due to animal dander, Allergic rhinitis due to dust mite       * ALLERGEN IMMUNOTHERAPY PRESCRIPTION     5 mL    Alternaria Tenuis GLY 1:10 w/v, HS  0.5 ml Epicoccum Nigrum 1:10 w/v, HS 0.5 ml Hormodendrum Cladosporioides 1:10 w/v, HS 0.5 ml Diluent: HSA qs to 5ml    Allergic rhinitis due to mold       * ALLERGEN IMMUNOTHERAPY PRESCRIPTION     5 mL    Estuardo, White  GLY 1:20 w/v, HS  0.5 ml Birch Mix GLY 1:20 w/v, HS  0.5 ml Elm, American GLY 1:20 w/v, HS  0.5 ml Hackberry GLY 1:20 w/v, HS 0.5 ml Hickory, Shagbark GLY 1:20 w/v, HS  0.5 ml Salome Mix GLY 1:20 w/v, HS 0.5 ml Oak Mix RVW GLY 1:20 w/v, HS 0.5 ml [place] Tree, Black GLY 1:20 w/v, HS 0.5 ml [place]brook, Black GLY 1:20 w/v, HS 0.5 ml Diluent: HSA qs to 5ml    Seasonal allergic rhinitis due to pollen       * ALLERGEN IMMUNOTHERAPY PRESCRIPTION     5 mL    Kochia GLY 1:20 w/v, HS 1.0 ml Nettle GLY 1:20 w/v, HS 1.0 ml Plantain, English GLY 1:20 w/v, HS 1.0 ml Ragweed Mixed 1:20 w/v ALK  0.6 ml Russian Thistle GLY 1:20 w/v, HS 1.0 ml Diluent: HSA qs to 5ml    Seasonal allergic rhinitis due to pollen        BENADRYL PO      Take 50 mg by mouth nightly as needed for allergies or sleep        cetirizine 10 MG tablet    zyrTEC    30 tablet    Take 1 tablet (10 mg) by mouth every evening    Chronic seasonal allergic rhinitis due to pollen       cyclobenzaprine 10 MG tablet    FLEXERIL    30 tablet    Take 0.5-1 tablets (5-10 mg) by mouth At Bedtime    Sacral back pain       EPINEPHrine 0.3 MG/0.3ML injection 2-pack    EPIPEN 2-ODETTE    0.6 mL    Inject 0.3 mLs (0.3 mg) into the muscle once as needed for anaphylaxis    Need for desensitization to allergens       FLONASE 50 MCG/ACT spray   Generic drug:  fluticasone     16 g    Spray 1-2 sprays into both nostrils daily    Seasonal allergic rhinitis, unspecified allergic rhinitis trigger       lamoTRIgine 200 MG tablet    LaMICtal     Take 200 mg by mouth every morning        * permethrin 1 % Liqd     60 mL    Apply to clean, towel-dried hair, saturate hair and scalp, wash off after 10 min.    Lice       * permethrin 1 % Liqd     60 mL    Apply to clean, towel-dried hair, saturate hair and scalp, wash off after 10 min.    Head lice       ziprasidone 40 MG capsule    GEODON     Take 160 mg by mouth At Bedtime        * Notice:  This list has 6 medication(s) that are the same as other medications prescribed for you. Read the directions carefully, and ask your doctor or other care provider to review them with you.

## 2017-08-03 ENCOUNTER — HOSPITAL ENCOUNTER (OUTPATIENT)
Dept: PHYSICAL THERAPY | Facility: CLINIC | Age: 32
Setting detail: THERAPIES SERIES
End: 2017-08-03
Attending: UROLOGY
Payer: COMMERCIAL

## 2017-08-03 PROCEDURE — 40000841 ZZH STATISTIC WOMEN'S HEALTH VISIT: Performed by: PHYSICAL THERAPIST

## 2017-08-03 PROCEDURE — 97161 PT EVAL LOW COMPLEX 20 MIN: CPT | Mod: GP | Performed by: PHYSICAL THERAPIST

## 2017-08-03 PROCEDURE — 97110 THERAPEUTIC EXERCISES: CPT | Mod: GP | Performed by: PHYSICAL THERAPIST

## 2017-08-04 NOTE — PROGRESS NOTES
[facility]          OUTPATIENT PHYSICAL THERAPY ORTHOPEDIC EVALUATION  PLAN OF TREATMENT FOR OUTPATIENT REHABILITATION  (COMPLETE FOR INITIAL CLAIMS ONLY)  Patient's Last Name, First Name, M.I.  YOB: 1985  AngelMaddy  MARYSOL    Provider s Name:  [facility]   Medical Record No.  [id number]   Start of Care Date:  08/03/17   Onset Date:  06/15/17   Type:     _X__PT   ___OT   ___SLP Medical Diagnosis:  Urinary Urgency     PT Diagnosis:  Impaired function of the PFM   Visits from SOC:  1      _________________________________________________________________________________  Plan of Treatment/Functional Goals:  joint mobilization, manual therapy, motor coordination training, neuromuscular re-education, strengthening, stretching     Biofeedback, Electrical stimulation     Goals  Goal Identifier: STG  Goal Description: 1) Pt will improve PFM control to report no wetness in underwear 3/7 days of the week, in 4 weeks.  Target Date: 08/31/17    Goal Identifier: STG  Goal Description: 2) Pt will improve pelvic support to report 2/10 pain or less with sitting 1 hour for classes in 4 weeks.  Target Date: 08/31/17    Goal Identifier: LTG  Goal Description: 3) Pt will report 5/7 days dry in 8 weeks.  Target Date: 09/28/17    Goal Identifier: LTG  Goal Description: 4) Pt will be ind in HEP to prevent return of symptoms in 8 weeks.  Target Date: 09/28/17    Goal Identifier: LTG  Goal Description: 5) Pt will report no pain with sitting in 8 weeks.  Target Date: 09/28/17                                     Therapy Frequency:  1 time/week  Predicted Duration of Therapy Intervention:  8 weeks weaning to every other week for up to 6 sessions    Renee Sutton, PT                 I CERTIFY THE NEED FOR THESE SERVICES FURNISHED UNDER        THIS PLAN OF TREATMENT AND WHILE UNDER MY CARE     (Physician co-signature of this document indicates review and certification of the therapy  plan).                         Certification Date From:  08/03/17   Certification Date To:  09/28/17    Referring Provider:  Dax Sanchez MD    Initial Assessment        See Epic Evaluation Start of Care Date: 08/03/17

## 2017-08-04 NOTE — PROGRESS NOTES
Physical Therapy Initial Pelvic Floor Muscle Evaluation   08/03/17 1300   General Information   Type of Visit Initial OP Ortho PT Evaluation   Start of Care Date 08/03/17   Referring Physician Dax Sanchez MD   Patient/Family Goals Statement Decrease symptom: urgency, tailbone pain with sitting   Orders Evaluate and Treat   Date of Order 06/15/17   Insurance Type MA;[employer]   Insurance Comments/Visits Authorized 20 visits auth'd   Medical Diagnosis Urinary urgency   Surgical/Medical history reviewed Yes   Precautions/Limitations no known precautions/limitations   General Information Comments PMHx: UTI (1x in last year, but was more frequent), generally healthy, depression/bipolar,    Body Part(s)   Body Part(s) Pelvic Floor Dysfunction   Presentation and Etiology   Pertinent history of current problem (include personal factors and/or comorbidities that impact the POC) About 1.5 yrs ago, pt began to get UTI's and bladder infections.  Pt thought she just had another UTI, but was told about 1 yr ago that she has an OAB. Was put on anticholinergics (ditropan, oxybutinin, etc) and they helped for a while, but then symptoms would come back.  Currently was referred to PT for urinary urgency.  Additionally pt has pain around the tailbone with sitting.    Impairments P. Bowel or bladder problems;A. Pain;B. Decreased WB tolerance;E. Decreased flexibility   Functional Limitations perform activities of daily living;perform desired leisure / sports activities   Symptom Location Génesis-tailbone pain   How/Where did it occur From insidious onset   Onset date of current episode/exacerbation 06/15/17   Chronicity Chronic   Best (/10) 0  (lie down or stand)   Worst (/10) 6   Pain quality A. Sharp;C. Aching;G. Cramping   Frequency of pain/symptoms C. With activity  (after sitting)   Pain/symptoms are: Worse during the day   Pain/symptoms exacerbated by A. Sitting;G. Certain positions   Pain/symptoms eased by B. Walking;C. Rest;E.  "Changing positions   Progression of symptoms since onset: Improved  (slightly)   Prior Level of Function   Functional Level Prior Comment Was doing well without UTI's or symptoms > 1.5 yrs ago   Current Level of Function   Current Community Support Family/friend caregiver   Patient role/employment history B. Student   Living environment Apartment/condo   Home/community accessibility Stairs used to bother, but not currently   Fall Risk Screen   Fall screen completed by PT   Per patient - Fall 2 or more times in past year? No   Per patient - Fall with injury in past year? No   Is patient a fall risk? No   Functional Scales   Q1: Sit > 45 mins for school   Q1(/10): 010   Q2: Void times  of every 3-4 hours   Q2( /10): 2/10   Q3: Stay dry all day every day   Q3( /10): 0/10   Pelvic Floor Dysfunction Questions   Regular exercise No   Fluid intake-glasses/day (one glass/cup = 8oz carbonated canned water = 36oz/day   Caffeinated beverages-glasses/day Root Beer = 24oz/day   Alcoholic beverages - glasses/day none   Recent diet change? No   How long can you delay the need to urinate?  varies   How many times do you wake to urinate at night?   1   How often do you urinate during the day?   Every 1 hour   Can you stop the flow of urine when on the toilet?  Yes   Is the volume of urine passed usually  Medium   Do you have the sensation that you need to go to the toilet?  Yes   Do you empty your bladder frequently, before you experience the urge to pass urine?  No   Do you have \"triggers\" that make you feel you can't wait to go to the toilet?  No   Number of bladder infections last year?  1   Frequency of bowel movements:  Daily    Consistency of stool?  Soft   Do you ignore the urge to defecate?  No   Women's Health Questions   Number of pregnancies  1   Number of vaginal deliveries  1   Number of  section deliveries  0   Weight of largest baby  7lbs 8oz   Number of episiotomies  1   Pelvic Floor Dysfunction Objective " Findings   Pain-pelvic dysfunction Pelvic pain   Observation Changes/shifts position often needed to lie down during session   Type of Storage Problem urge incontinence;urgency;frequency;stress incontinence   Type of Emptying Problem strain to void   Protection needed None   Power (MMT at Levator Ani) 3+/5   Endurance (Up to 10 seconds as long as still 50% power) 10 sec   Repetitions (Contract 10 seconds or MVC, rest 4 seconds and count max number of reps) 5   Fast Twitch (Number of 1 second contractions can do in 10 seconds. Norm=7 reps) 7   Elevation (Able to lift posterior vaginal wall toward head and public bone) Present   Pelvic Palpation of the LA (iliococcygeus) is tender (L) > (R); no increased tenderness with deeper palpation at Alcock's canal. (B) sacrotuberous ligs painful with mod pressure.   None States she may need to wear a pad, but prefers not to, and is okay just changing underwear.   Planned Therapy Interventions   Planned Therapy Interventions joint mobilization;manual therapy;motor coordination training;neuromuscular re-education;strengthening;stretching   Planned Modality Interventions   Planned Modality Interventions Biofeedback;Electrical stimulation   Clinical Impression   Criteria for Skilled Therapeutic Interventions Met yes, treatment indicated   PT Diagnosis Impaired function of the PFM   Influenced by the following impairments weakness, pain, decreased flexibility in the pelvic ligaments (sacrotuberous)   Functional limitations due to impairments decreased tolerance for sitting, painful sit to stand   Clinical Presentation Stable/Uncomplicated   Clinical Presentation Rationale Pt states symptoms have been present and fairly unchanged for the last 1 year. Minimal comorbidities.   Clinical Decision Making (Complexity) Low complexity   Therapy Frequency 1 time/week   Predicted Duration of Therapy Intervention (days/wks) 8 weeks weaning to every other week for up to 6 sessions   Risk &  "Benefits of therapy have been explained Yes   Patient, Family & other staff in agreement with plan of care Yes   Clinical Impression Comments Pt presents with 1 to 1.5 yr h/o OAB symptoms similar to UTI.  In last 1 yr describes pain in \"skin around the tailbone.\" Pt referred to PT for urinary urgency.  Pt could benefit from skilled PT to improve PFM control to ease OAB symptoms and decrease pelvic pain.   Education Assessment   Preferred Learning Style Listening;Reading;Demonstration;Pictures/video   Barriers to Learning No barriers   Ortho Goal 1   Goal Identifier STG   Goal Description 1) Pt will improve PFM control to report no wetness in underwear 3/7 days of the week, in 4 weeks.   Target Date 08/31/17   Ortho Goal 2   Goal Identifier STG   Goal Description 2) Pt will improve pelvic support to report 2/10 pain or less with sitting 1 hour for classes in 4 weeks.   Target Date 08/31/17   Ortho Goal 3   Goal Identifier LTG   Goal Description 3) Pt will report 5/7 days dry in 8 weeks.   Target Date 09/28/17   Ortho Goal 4   Goal Identifier LTG   Goal Description 4) Pt will be ind in HEP to prevent return of symptoms in 8 weeks.   Target Date 09/28/17   Ortho Goal 5   Goal Identifier LTG   Goal Description 5) Pt will report no pain with sitting in 8 weeks.   Target Date 09/28/17   Total Evaluation Time   Total Evaluation Time 35   Therapy Certification   Certification date from 08/03/17   Certification date to 09/28/17   Medical Diagnosis Urinary Urgency   Thank you for the referral of this patient.  Renee Sutton, PT, MA  #[id number]    "

## 2017-08-08 ENCOUNTER — HOSPITAL ENCOUNTER (OUTPATIENT)
Dept: CARDIOLOGY | Facility: CLINIC | Age: 32
Discharge: HOME | End: 2017-08-08
Attending: INTERNAL MEDICINE | Admitting: INTERNAL MEDICINE
Payer: COMMERCIAL

## 2017-08-08 ENCOUNTER — ALLIED HEALTH/NURSE VISIT (OUTPATIENT)
Dept: ALLERGY | Facility: CLINIC | Age: 32
End: 2017-08-08
Payer: COMMERCIAL

## 2017-08-08 DIAGNOSIS — J30.9 ALLERGIC RHINITIS, UNSPECIFIED: Primary | ICD-10-CM

## 2017-08-08 DIAGNOSIS — R00.0 SINUS TACHYCARDIA: ICD-10-CM

## 2017-08-08 PROCEDURE — 93306 TTE W/DOPPLER COMPLETE: CPT

## 2017-08-08 PROCEDURE — 99207 ZZC NO CHARGE LOS: CPT

## 2017-08-08 PROCEDURE — 93306 TTE W/DOPPLER COMPLETE: CPT | Mod: 26 | Performed by: INTERNAL MEDICINE

## 2017-08-08 PROCEDURE — 95117 IMMUNOTHERAPY INJECTIONS: CPT

## 2017-08-08 NOTE — MR AVS SNAPSHOT
After Visit Summary   8/8/2017    Maddy Ortiz    MRN: [id number]           Patient Information     Date Of Birth          1985        Visit Information        Provider Department      8/8/2017 2:00 PM ALLERGY [facility]        Today's Diagnoses     Allergic rhinitis, unspecified    -  1       Follow-ups after your visit        Your next 10 appointments already scheduled     Aug 08, 2017  2:00 PM CDT   Nurse Only with ALLERGY [facility] ([facility])    [address] [address] [phone]           Every allergy patient MUST wait 30 minutes after their allergy shot. No exceptions.  Xolair shots #1-3 should plan to wait 2 hours in clinic Xolair shots after #4 should plan 30 minute wait in clinic            Aug 29, 2017 10:00 AM CDT   Women's Health Treatment with Renee Sutton PT   [facility] ([facility])    [address]  [address]  [place] [id number] [phone]            Sep 05, 2017 10:00 AM CDT   Women's Health Treatment with Renee Sutton PT   [facility] ([facility])    [address]  [address]  [facility] [id number] [phone]              Who to contact     If you have questions or need follow up information about today's clinic visit or your schedule please contact [facility] directly at [phone].  Normal or non-critical lab and imaging results will be communicated to you by MyChart, letter or phone within 4 business days after the clinic has received the results. If you do not hear from us within 7 days, please contact the clinic through MyChart or phone. If you have a critical or abnormal lab result, we will notify you by phone as soon as possible.  Submit refill requests through [employer] or call your pharmacy and they will forward the refill request to us. Please allow 3  "business days for your refill to be completed.          Additional Information About Your Visit        MyChart Information     [employer] lets you send messages to your doctor, view your test results, renew your prescriptions, schedule appointments and more. To sign up, go to [email or web address] . Click on \"Log in\" on the left side of the screen, which will take you to the Welcome page. Then click on \"Sign up Now\" on the right side of the page.     You will be asked to enter the access code listed below, as well as some personal information. Please follow the directions to create your username and password.     Your access code is: [email or web address]  Expires: 10/18/2017  1:19 PM     Your access code will  in 90 days. If you need help or a new code, please call your [place] clinic or [phone].        Care EveryWhere ID     This is your Care EveryWhere ID. This could be used by other organizations to access your [place] medical records  [id number]         Blood Pressure from Last 3 Encounters:   17 121/81   17 122/82   17 119/83    Weight from Last 3 Encounters:   17 195 lb (88.5 kg)   17 194 lb 9.6 oz (88.3 kg)   17 192 lb 12.8 oz (87.5 kg)              We Performed the Following     Allergy Shot: Two or more injections        Primary Care Provider Office Phone #    [facility] [phone] [address] [facility] [address]        Equal Access to Services     ARMOND MCKEON AH: Hadii linda robbinso Soradha, waaxda luqadaha, qaybta kaalmada russ, lincoln fernandez. So [facility] [phone].    ATENCIÓN: Si habla español, tiene a titus disposición servicios gratuitos de asistencia lingüística. Llame al [phone].    We comply with applicable federal civil rights laws and [place] laws. We do not discriminate on the basis of race, color, national origin, age, disability sex, sexual orientation or gender " identity.            Thank you!     Thank you for choosing [facility]  for your care. Our goal is always to provide you with excellent care. Hearing back from our patients is one way we can continue to improve our services. Please take a few minutes to complete the written survey that you may receive in the mail after your visit with us. Thank you!             Your Updated Medication List - Protect others around you: Learn how to safely use, store and throw away your medicines at www.disposemymeds.org.          This list is accurate as of: 8/8/17  1:44 PM.  Always use your most recent med list.                   Brand Name Dispense Instructions for use Diagnosis    * ALLERGEN IMMUNOTHERAPY PRESCRIPTION     5 mL    Cat Hair, Standardized 10,000 BAU/mL, ALK  3.0 ml Dog Hair Dander, A. P.  1:100 w/v, HS  1.0 ml Dust Mites F 30,000AU/mL, HS  0.5 ml Dust Mites P. 30,000 AU/mL, HS  0.5 ml  Diluent: HSA qs to 5ml    Allergic rhinitis due to animal dander, Allergic rhinitis due to dust mite       * ALLERGEN IMMUNOTHERAPY PRESCRIPTION     5 mL    Alternaria Tenuis GLY 1:10 w/v, HS  0.5 ml Epicoccum Nigrum 1:10 w/v, HS 0.5 ml Hormodendrum Cladosporioides 1:10 w/v, HS 0.5 ml Diluent: HSA qs to 5ml    Allergic rhinitis due to mold       * ALLERGEN IMMUNOTHERAPY PRESCRIPTION     5 mL    Estuardo, White  GLY 1:20 w/v, HS  0.5 ml Birch Mix GLY 1:20 w/v, HS  0.5 ml Elm, American GLY 1:20 w/v, HS  0.5 ml Hackberry GLY 1:20 w/v, HS 0.5 ml Hickory, Shagbark GLY 1:20 w/v, HS  0.5 ml Quilcene Mix GLY 1:20 w/v, HS 0.5 ml Oak Mix RVW GLY 1:20 w/v, HS 0.5 ml [place] Tree, Black GLY 1:20 w/v, HS 0.5 ml [place], Black GLY 1:20 w/v, HS 0.5 ml Diluent: HSA qs to 5ml    Seasonal allergic rhinitis due to pollen       * ALLERGEN IMMUNOTHERAPY PRESCRIPTION     5 mL    Kochia GLY 1:20 w/v, HS 1.0 ml Nettle GLY 1:20 w/v, HS 1.0 ml Plantain, English GLY 1:20 w/v, HS 1.0 ml Ragweed Mixed 1:20 w/v ALK  0.6 ml Russian Thistle GLY 1:20 w/v, HS 1.0  ml Diluent: HSA qs to 5ml    Seasonal allergic rhinitis due to pollen       BENADRYL PO      Take 50 mg by mouth nightly as needed for allergies or sleep        cetirizine 10 MG tablet    zyrTEC    30 tablet    Take 1 tablet (10 mg) by mouth every evening    Chronic seasonal allergic rhinitis due to pollen       cyclobenzaprine 10 MG tablet    FLEXERIL    30 tablet    Take 0.5-1 tablets (5-10 mg) by mouth At Bedtime    Sacral back pain       EPINEPHrine 0.3 MG/0.3ML injection 2-pack    EPIPEN 2-ODETTE    0.6 mL    Inject 0.3 mLs (0.3 mg) into the muscle once as needed for anaphylaxis    Need for desensitization to allergens       FLONASE 50 MCG/ACT spray   Generic drug:  fluticasone     16 g    Spray 1-2 sprays into both nostrils daily    Seasonal allergic rhinitis, unspecified allergic rhinitis trigger       lamoTRIgine 200 MG tablet    LaMICtal     Take 200 mg by mouth every morning        * permethrin 1 % Liqd     60 mL    Apply to clean, towel-dried hair, saturate hair and scalp, wash off after 10 min.    Lice       * permethrin 1 % Liqd     60 mL    Apply to clean, towel-dried hair, saturate hair and scalp, wash off after 10 min.    Head lice       ziprasidone 40 MG capsule    GEODON     Take 160 mg by mouth At Bedtime        * Notice:  This list has 6 medication(s) that are the same as other medications prescribed for you. Read the directions carefully, and ask your doctor or other care provider to review them with you.

## 2017-08-17 ENCOUNTER — ALLIED HEALTH/NURSE VISIT (OUTPATIENT)
Dept: ALLERGY | Facility: CLINIC | Age: 32
End: 2017-08-17
Payer: COMMERCIAL

## 2017-08-17 DIAGNOSIS — J30.9 ALLERGIC RHINITIS, UNSPECIFIED: Primary | ICD-10-CM

## 2017-08-17 PROCEDURE — 95117 IMMUNOTHERAPY INJECTIONS: CPT

## 2017-08-17 NOTE — MR AVS SNAPSHOT
"              After Visit Summary   8/17/2017    Maddy Ortiz    MRN: [id number]           Patient Information     Date Of Birth          1985        Visit Information        Provider Department      8/17/2017 3:45 PM ALLERGY [facility] - [facility]        Today's Diagnoses     Allergic rhinitis, unspecified    -  1       Follow-ups after your visit        Your next 10 appointments already scheduled     Aug 29, 2017 10:00 AM CDT   Women's [facility] Treatment with Renee Sutton PT   [facility] ([facility])    [address]  [address]  [place] [address]   [phone]            Sep 05, 2017 10:00 AM CDT   [facility] Treatment with Renee Sutton PT   [facility] ([facility])    [address]  [place] [address]   [phone]              Who to contact     If you have questions or need follow up information about today's clinic visit or your schedule please contact [facility] directly at [phone].  Normal or non-critical lab and imaging results will be communicated to you by TMShart, letter or phone within 4 business days after the clinic has received the results. If you do not hear from us within 7 days, please contact the clinic through TMShart or phone. If you have a critical or abnormal lab result, we will notify you by phone as soon as possible.  Submit refill requests through [employer] or call your pharmacy and they will forward the refill request to us. Please allow 3 business days for your refill to be completed.          Additional Information About Your Visit        MyChart Information     [employer] lets you send messages to your doctor, view your test results, renew your prescriptions, schedule appointments and more. To sign up, go to [email or web address] . Click on \"Log in\" on the left side of the screen, which will take you to the Welcome page. Then click on " "\"Sign up Now\" on the right side of the page.     You will be asked to enter the access code listed below, as well as some personal information. Please follow the directions to create your username and password.     Your access code is: [email or web address]  Expires: 10/18/2017  1:19 PM     Your access code will  in 90 days. If you need help or a new code, please call your [facility] or [phone].        Care EveryWhere ID     This is your Care EveryWhere ID. This could be used by other organizations to access your [place] medical records  [id number]         Blood Pressure from Last 3 Encounters:   17 121/81   17 122/82   17 119/83    Weight from Last 3 Encounters:   17 195 lb (88.5 kg)   17 194 lb 9.6 oz (88.3 kg)   17 192 lb 12.8 oz (87.5 kg)              We Performed the Following     Allergy Shot: Two or more injections        Primary Care Provider Office Phone #    [facility] [phone] [address] [address]        Equal Access to Services     ARMOND MCKEON AH: Hadii linda ku itzelo Soradha, waaxda luqadaha, qaybta kaalmada adeegyada, lnicoln fernandez. So [facility] [place] [facility] [phone].    ATENCIÓN: Si habla español, tiene a titus disposición servicios gratuitos de asistencia lingüística. Yadira al [phone].    We comply with applicable federal civil rights laws and [place] laws. We do not discriminate on the basis of race, color, national origin, age, disability sex, sexual orientation or gender identity.            Thank you!     Thank you for choosing [facility]  for your care. Our goal is always to provide you with excellent care. Hearing back from our patients is one way we can continue to improve our services. Please take a few minutes to complete the written survey that you may receive in the mail after your visit with us. Thank you!             Your Updated Medication List - Protect " others around you: Learn how to safely use, store and throw away your medicines at www.disposemymeds.org.          This list is accurate as of: 8/17/17  4:02 PM.  Always use your most recent med list.                   Brand Name Dispense Instructions for use Diagnosis    * ALLERGEN IMMUNOTHERAPY PRESCRIPTION     5 mL    Cat Hair, Standardized 10,000 BAU/mL, ALK  3.0 ml Dog Hair Dander, A. P.  1:100 w/v, HS  1.0 ml Dust Mites F 30,000AU/mL, HS  0.5 ml Dust Mites P. 30,000 AU/mL, HS  0.5 ml  Diluent: HSA qs to 5ml    Allergic rhinitis due to animal dander, Allergic rhinitis due to dust mite       * ALLERGEN IMMUNOTHERAPY PRESCRIPTION     5 mL    Alternaria Tenuis GLY 1:10 w/v, HS  0.5 ml Epicoccum Nigrum 1:10 w/v, HS 0.5 ml Hormodendrum Cladosporioides 1:10 w/v, HS 0.5 ml Diluent: HSA qs to 5ml    Allergic rhinitis due to mold       * ALLERGEN IMMUNOTHERAPY PRESCRIPTION     5 mL    Estuardo, White  GLY 1:20 w/v, HS  0.5 ml Birch Mix GLY 1:20 w/v, HS  0.5 ml Elm, American GLY 1:20 w/v, HS  0.5 ml Hackberry GLY 1:20 w/v, HS 0.5 ml Hickory, Shagbark GLY 1:20 w/v, HS  0.5 ml [place] Mix GLY 1:20 w/v, HS 0.5 ml Oak Mix RVW GLY 1:20 w/v, HS 0.5 ml [place] Tree, Black GLY 1:20 w/v, HS 0.5 ml [place], Black GLY 1:20 w/v, HS 0.5 ml Diluent: HSA qs to 5ml    Seasonal allergic rhinitis due to pollen       * ALLERGEN IMMUNOTHERAPY PRESCRIPTION     5 mL    Kochia GLY 1:20 w/v, HS 1.0 ml Nettle GLY 1:20 w/v, HS 1.0 ml Plantain, English GLY 1:20 w/v, HS 1.0 ml Ragweed Mixed 1:20 w/v ALK  0.6 ml Russian Thistle GLY 1:20 w/v, HS 1.0 ml Diluent: HSA qs to 5ml    Seasonal allergic rhinitis due to pollen       BENADRYL PO      Take 50 mg by mouth nightly as needed for allergies or sleep        cetirizine 10 MG tablet    zyrTEC    30 tablet    Take 1 tablet (10 mg) by mouth every evening    Chronic seasonal allergic rhinitis due to pollen       cyclobenzaprine 10 MG tablet    FLEXERIL    30 tablet    Take 0.5-1 tablets (5-10 mg) by mouth At  Bedtime    Sacral back pain       EPINEPHrine 0.3 MG/0.3ML injection 2-pack    EPIPEN 2-ODETTE    0.6 mL    Inject 0.3 mLs (0.3 mg) into the muscle once as needed for anaphylaxis    Need for desensitization to allergens       FLONASE 50 MCG/ACT spray   Generic drug:  fluticasone     16 g    Spray 1-2 sprays into both nostrils daily    Seasonal allergic rhinitis, unspecified allergic rhinitis trigger       lamoTRIgine 200 MG tablet    LaMICtal     Take 200 mg by mouth every morning        * permethrin 1 % Liqd     60 mL    Apply to clean, towel-dried hair, saturate hair and scalp, wash off after 10 min.    Lice       * permethrin 1 % Liqd     60 mL    Apply to clean, towel-dried hair, saturate hair and scalp, wash off after 10 min.    Head lice       ziprasidone 40 MG capsule    GEODON     Take 160 mg by mouth At Bedtime        * Notice:  This list has 6 medication(s) that are the same as other medications prescribed for you. Read the directions carefully, and ask your doctor or other care provider to review them with you.

## 2017-08-22 ENCOUNTER — ALLIED HEALTH/NURSE VISIT (OUTPATIENT)
Dept: ALLERGY | Facility: CLINIC | Age: 32
End: 2017-08-22
Payer: COMMERCIAL

## 2017-08-22 DIAGNOSIS — J30.9 ALLERGIC RHINITIS, UNSPECIFIED: Primary | ICD-10-CM

## 2017-08-22 PROCEDURE — 95117 IMMUNOTHERAPY INJECTIONS: CPT

## 2017-08-22 NOTE — MR AVS SNAPSHOT
"              After Visit Summary   8/22/2017    Maddy Ortiz    MRN: [id number]           Patient Information     Date Of Birth          1985        Visit Information        Provider Department      8/22/2017 2:00 PM ALLERGY [facility] - [facility]        Today's Diagnoses     Allergic rhinitis, unspecified    -  1       Follow-ups after your visit        Your next 10 appointments already scheduled     Aug 29, 2017 10:00 AM CDT   Women's [facility] Treatment with Renee Sutton PT   [facility] ([facility])    [address]  [address]  [place] [address]   [phone]            Sep 05, 2017 10:00 AM CDT   [facility] Treatment with Renee Sutton PT   [facility] ([facility])    [address]  [place] [address]   [phone]              Who to contact     If you have questions or need follow up information about today's clinic visit or your schedule please contact [facility] directly at [phone].  Normal or non-critical lab and imaging results will be communicated to you by [employer], letter or phone within 4 business days after the clinic has received the results. If you do not hear from us within 7 days, please contact the clinic through [employer] or phone. If you have a critical or abnormal lab result, we will notify you by phone as soon as possible.  Submit refill requests through [employer] or call your pharmacy and they will forward the refill request to us. Please allow 3 business days for your refill to be completed.          Additional Information About Your Visit        MyChart Information     [employer] lets you send messages to your doctor, view your test results, renew your prescriptions, schedule appointments and more. To sign up, go to [email or web address] . Click on \"Log in\" on the left side of the screen, which will take you to the Welcome page. Then click on " "\"Sign up Now\" on the right side of the page.     You will be asked to enter the access code listed below, as well as some personal information. Please follow the directions to create your username and password.     Your access code is: [email or web address]  Expires: 10/18/2017  1:19 PM     Your access code will  in 90 days. If you need help or a new code, please call your [facility] or [phone].        Care EveryWhere ID     This is your Care EveryWhere ID. This could be used by other organizations to access your [place] medical records  [id number]         Blood Pressure from Last 3 Encounters:   17 121/81   17 122/82   17 119/83    Weight from Last 3 Encounters:   17 195 lb (88.5 kg)   17 194 lb 9.6 oz (88.3 kg)   17 192 lb 12.8 oz (87.5 kg)              We Performed the Following     Allergy Shot: Two or more injections        Primary Care Provider Office Phone #    [facility] [phone] [address] [address]        Equal Access to Services     ARMOND MCKEON AH: Hadii linda ku itzelo Soradha, waaxda luqadaha, qaybta kaalmada adeegyada, lincoln fernandez. So [facility] [phone].    ATENCIÓN: Si habla español, tiene a titus disposición servicios gratuitos de asistencia lingüística. Yadira al [phone].    We comply with applicable federal civil rights laws and [place] laws. We do not discriminate on the basis of race, color, national origin, age, disability sex, sexual orientation or gender identity.            Thank you!     Thank you for choosing [facility]  for your care. Our goal is always to provide you with excellent care. Hearing back from our patients is one way we can continue to improve our services. Please take a few minutes to complete the written survey that you may receive in the mail after your visit with us. Thank you!             Your Updated Medication List - Protect " others around you: Learn how to safely use, store and throw away your medicines at www.disposemymeds.org.          This list is accurate as of: 8/22/17  2:55 PM.  Always use your most recent med list.                   Brand Name Dispense Instructions for use Diagnosis    * ALLERGEN IMMUNOTHERAPY PRESCRIPTION     5 mL    Cat Hair, Standardized 10,000 BAU/mL, ALK  3.0 ml Dog Hair Dander, A. P.  1:100 w/v, HS  1.0 ml Dust Mites F 30,000AU/mL, HS  0.5 ml Dust Mites P. 30,000 AU/mL, HS  0.5 ml  Diluent: HSA qs to 5ml    Allergic rhinitis due to animal dander, Allergic rhinitis due to dust mite       * ALLERGEN IMMUNOTHERAPY PRESCRIPTION     5 mL    Alternaria Tenuis GLY 1:10 w/v, HS  0.5 ml Epicoccum Nigrum 1:10 w/v, HS 0.5 ml Hormodendrum Cladosporioides 1:10 w/v, HS 0.5 ml Diluent: HSA qs to 5ml    Allergic rhinitis due to mold       * ALLERGEN IMMUNOTHERAPY PRESCRIPTION     5 mL    Estuardo, White  GLY 1:20 w/v, HS  0.5 ml Birch Mix GLY 1:20 w/v, HS  0.5 ml Elm, American GLY 1:20 w/v, HS  0.5 ml Hackberry GLY 1:20 w/v, HS 0.5 ml Hickory, Shagbark GLY 1:20 w/v, HS  0.5 ml [place] Mix GLY 1:20 w/v, HS 0.5 ml Oak Mix RVW GLY 1:20 w/v, HS 0.5 ml [place] Tree, Black GLY 1:20 w/v, HS 0.5 ml [place], Black GLY 1:20 w/v, HS 0.5 ml Diluent: HSA qs to 5ml    Seasonal allergic rhinitis due to pollen       * ALLERGEN IMMUNOTHERAPY PRESCRIPTION     5 mL    Kochia GLY 1:20 w/v, HS 1.0 ml Nettle GLY 1:20 w/v, HS 1.0 ml Plantain, English GLY 1:20 w/v, HS 1.0 ml Ragweed Mixed 1:20 w/v ALK  0.6 ml Russian Thistle GLY 1:20 w/v, HS 1.0 ml Diluent: HSA qs to 5ml    Seasonal allergic rhinitis due to pollen       BENADRYL PO      Take 50 mg by mouth nightly as needed for allergies or sleep        cetirizine 10 MG tablet    zyrTEC    30 tablet    Take 1 tablet (10 mg) by mouth every evening    Chronic seasonal allergic rhinitis due to pollen       cyclobenzaprine 10 MG tablet    FLEXERIL    30 tablet    Take 0.5-1 tablets (5-10 mg) by mouth At  Bedtime    Sacral back pain       EPINEPHrine 0.3 MG/0.3ML injection 2-pack    EPIPEN 2-ODETTE    0.6 mL    Inject 0.3 mLs (0.3 mg) into the muscle once as needed for anaphylaxis    Need for desensitization to allergens       FLONASE 50 MCG/ACT spray   Generic drug:  fluticasone     16 g    Spray 1-2 sprays into both nostrils daily    Seasonal allergic rhinitis, unspecified allergic rhinitis trigger       lamoTRIgine 200 MG tablet    LaMICtal     Take 200 mg by mouth every morning        * permethrin 1 % Liqd     60 mL    Apply to clean, towel-dried hair, saturate hair and scalp, wash off after 10 min.    Lice       * permethrin 1 % Liqd     60 mL    Apply to clean, towel-dried hair, saturate hair and scalp, wash off after 10 min.    Head lice       ziprasidone 40 MG capsule    GEODON     Take 160 mg by mouth At Bedtime        * Notice:  This list has 6 medication(s) that are the same as other medications prescribed for you. Read the directions carefully, and ask your doctor or other care provider to review them with you.

## 2017-09-05 ENCOUNTER — HOSPITAL ENCOUNTER (OUTPATIENT)
Dept: PHYSICAL THERAPY | Facility: CLINIC | Age: 32
Setting detail: THERAPIES SERIES
End: 2017-09-05
Attending: UROLOGY
Payer: COMMERCIAL

## 2017-09-05 ENCOUNTER — ALLIED HEALTH/NURSE VISIT (OUTPATIENT)
Dept: ALLERGY | Facility: CLINIC | Age: 32
End: 2017-09-05
Payer: MEDICAID

## 2017-09-05 DIAGNOSIS — J30.9 ALLERGIC RHINITIS, UNSPECIFIED: Primary | ICD-10-CM

## 2017-09-05 PROCEDURE — 95117 IMMUNOTHERAPY INJECTIONS: CPT

## 2017-09-05 PROCEDURE — 90911 ZZHC PT BIOFEEDBACK (PFM): CPT | Mod: GP | Performed by: PHYSICAL THERAPIST

## 2017-09-05 PROCEDURE — 40000841 ZZH STATISTIC WOMEN'S HEALTH VISIT: Performed by: PHYSICAL THERAPIST

## 2017-09-05 PROCEDURE — 97110 THERAPEUTIC EXERCISES: CPT | Mod: GP | Performed by: PHYSICAL THERAPIST

## 2017-09-05 NOTE — PROGRESS NOTES
Patient presented after waiting 30 minutes with no reaction to  injections. Discharged from clinic.    Maddison Cabezas RN

## 2017-09-05 NOTE — MR AVS SNAPSHOT
"              After Visit Summary   9/5/2017    Maddy Ortiz    MRN: [id number]           Patient Information     Date Of Birth          1985        Visit Information        Provider Department      9/5/2017 11:00 AM ALLERGY [place] - [facility]        Today's Diagnoses     Allergic rhinitis, unspecified    -  1       Follow-ups after your visit        Your next 10 appointments already scheduled     Sep 22, 2017 10:00 AM CDT   Women's Health Treatment with Renee Sutton PT   [facility] ([facility])    [address]  [address]  [place] [address] [phone]              Who to contact     If you have questions or need follow up information about today's clinic visit or your schedule please contact [facility] directly at [phone].  Normal or non-critical lab and imaging results will be communicated to you by MyChart, letter or phone within 4 business days after the clinic has received the results. If you do not hear from us within 7 days, please contact the clinic through MyChart or phone. If you have a critical or abnormal lab result, we will notify you by phone as soon as possible.  Submit refill requests through [employer] or call your pharmacy and they will forward the refill request to us. Please allow 3 business days for your refill to be completed.          Additional Information About Your Visit        MyChart Information     [employer] lets you send messages to your doctor, view your test results, renew your prescriptions, schedule appointments and more. To sign up, go to [email or web address] . Click on \"Log in\" on the left side of the screen, which will take you to the Welcome page. Then click on \"Sign up Now\" on the right side of the page.     You will be asked to enter the access code listed below, as well as some personal information. Please follow the directions to create your username and password.     Your access " code is: [email or web address]  Expires: 10/18/2017  1:19 PM     Your access code will  in 90 days. If you need help or a new code, please call your [place] clinic or [phone].        Care EveryWhere ID     This is your Care EveryWhere ID. This could be used by other organizations to access your [place] medical records  [id number]         Blood Pressure from Last 3 Encounters:   17 121/81   17 122/82   17 119/83    Weight from Last 3 Encounters:   17 195 lb (88.5 kg)   17 194 lb 9.6 oz (88.3 kg)   17 192 lb 12.8 oz (87.5 kg)              We Performed the Following     Allergy Shot: Two or more injections        Primary Care Provider Office Phone #    [facility] [phone] [address] [address]        Equal Access to Services     ARMOND MCKEON : Hadii aad ku hadasho Soomaali, waaxda luqadaha, qaybta kaalmada adeegyada, waxay idiin hayflorencen srinivasan maciel . So [facility] [phone].    ATENCIÓN: Si habla español, tiene a titus disposición servicios gratuitos de asistencia lingüística. Llame al [phone].    We comply with applicable federal civil rights laws and [place] laws. We do not discriminate on the basis of race, color, national origin, age, disability sex, sexual orientation or gender identity.            Thank you!     Thank you for choosing [facility]  for your care. Our goal is always to provide you with excellent care. Hearing back from our patients is one way we can continue to improve our services. Please take a few minutes to complete the written survey that you may receive in the mail after your visit with us. Thank you!             Your Updated Medication List - Protect others around you: Learn how to safely use, store and throw away your medicines at www.disposemymeds.org.          This list is accurate as of: 17 12:17 PM.  Always use your most recent med list.                   Brand Name  Dispense Instructions for use Diagnosis    * ALLERGEN IMMUNOTHERAPY PRESCRIPTION     5 mL    Cat Hair, Standardized 10,000 BAU/mL, ALK  3.0 ml Dog Hair Dander, A. P.  1:100 w/v, HS  1.0 ml Dust Mites F 30,000AU/mL, HS  0.5 ml Dust Mites P. 30,000 AU/mL, HS  0.5 ml  Diluent: HSA qs to 5ml    Allergic rhinitis due to animal dander, Allergic rhinitis due to dust mite       * ALLERGEN IMMUNOTHERAPY PRESCRIPTION     5 mL    Alternaria Tenuis GLY 1:10 w/v, HS  0.5 ml Epicoccum Nigrum 1:10 w/v, HS 0.5 ml Hormodendrum Cladosporioides 1:10 w/v, HS 0.5 ml Diluent: HSA qs to 5ml    Allergic rhinitis due to mold       * ALLERGEN IMMUNOTHERAPY PRESCRIPTION     5 mL    Estuardo, White  GLY 1:20 w/v, HS  0.5 ml Birch Mix GLY 1:20 w/v, HS  0.5 ml Elm, American GLY 1:20 w/v, HS  0.5 ml Hackberry GLY 1:20 w/v, HS 0.5 ml Hickory, Shagbark GLY 1:20 w/v, HS  0.5 ml [place] Mix GLY 1:20 w/v, HS 0.5 ml Oak Mix RVW GLY 1:20 w/v, HS 0.5 ml [place] Tree, Black GLY 1:20 w/v, HS 0.5 ml [place], Black GLY 1:20 w/v, HS 0.5 ml Diluent: HSA qs to 5ml    Seasonal allergic rhinitis due to pollen       * ALLERGEN IMMUNOTHERAPY PRESCRIPTION     5 mL    Kochia GLY 1:20 w/v, HS 1.0 ml Nettle GLY 1:20 w/v, HS 1.0 ml Plantain, English GLY 1:20 w/v, HS 1.0 ml Ragweed Mixed 1:20 w/v ALK  0.6 ml Russian Thistle GLY 1:20 w/v, HS 1.0 ml Diluent: HSA qs to 5ml    Seasonal allergic rhinitis due to pollen       BENADRYL PO      Take 50 mg by mouth nightly as needed for allergies or sleep        cetirizine 10 MG tablet    zyrTEC    30 tablet    Take 1 tablet (10 mg) by mouth every evening    Chronic seasonal allergic rhinitis due to pollen       cyclobenzaprine 10 MG tablet    FLEXERIL    30 tablet    Take 0.5-1 tablets (5-10 mg) by mouth At Bedtime    Sacral back pain       EPINEPHrine 0.3 MG/0.3ML injection 2-pack    EPIPEN 2-ODETTE    0.6 mL    Inject 0.3 mLs (0.3 mg) into the muscle once as needed for anaphylaxis    Need for desensitization to allergens       FLONASE  50 MCG/ACT spray   Generic drug:  fluticasone     16 g    Spray 1-2 sprays into both nostrils daily    Seasonal allergic rhinitis, unspecified allergic rhinitis trigger       lamoTRIgine 200 MG tablet    LaMICtal     Take 200 mg by mouth every morning        * permethrin 1 % Liqd     60 mL    Apply to clean, towel-dried hair, saturate hair and scalp, wash off after 10 min.    Lice       * permethrin 1 % Liqd     60 mL    Apply to clean, towel-dried hair, saturate hair and scalp, wash off after 10 min.    Head lice       ziprasidone 40 MG capsule    GEODON     Take 160 mg by mouth At Bedtime        * Notice:  This list has 6 medication(s) that are the same as other medications prescribed for you. Read the directions carefully, and ask your doctor or other care provider to review them with you.

## 2017-09-13 ENCOUNTER — NURSE TRIAGE (OUTPATIENT)
Dept: NURSING | Facility: CLINIC | Age: 32
End: 2017-09-13

## 2017-09-14 NOTE — TELEPHONE ENCOUNTER
Reason for Disposition    [1] Periods are WORSE (more bleeding or pain) since IUD was placed AND [2] more than 3 months (3 menstrual cycles) since IUD was placed    Additional Information    Negative: Pregnant    Negative: [1] Periods with > 6 soaked pads or tampons per day AND [2] last > 7 days    Negative: Worried that IUD is not in right place (e.g., not able to feel the IUD string, string longer than usual, can feel hard plastic of IUD)    Negative: IUD came out (e.g., has IUD in her hand)    Negative: Bad smelling vaginal discharge    Negative: Abnormal color vaginal discharge (i.e., yellow, green, gray)    Protocols used: CONTRACEPTION - IUD SYMPTOMS AND QUESTIONS-ADULT-

## 2017-09-15 ENCOUNTER — OFFICE VISIT (OUTPATIENT)
Dept: OBGYN | Facility: CLINIC | Age: 32
End: 2017-09-15
Payer: COMMERCIAL

## 2017-09-15 ENCOUNTER — ALLIED HEALTH/NURSE VISIT (OUTPATIENT)
Dept: ALLERGY | Facility: CLINIC | Age: 32
End: 2017-09-15
Payer: COMMERCIAL

## 2017-09-15 VITALS
HEART RATE: 88 BPM | DIASTOLIC BLOOD PRESSURE: 71 MMHG | BODY MASS INDEX: 31.69 KG/M2 | WEIGHT: 190.2 LBS | HEIGHT: 65 IN | SYSTOLIC BLOOD PRESSURE: 116 MMHG

## 2017-09-15 DIAGNOSIS — Z30.430 ENCOUNTER FOR INSERTION OF MIRENA IUD: ICD-10-CM

## 2017-09-15 DIAGNOSIS — J30.9 ALLERGIC RHINITIS, UNSPECIFIED: Primary | ICD-10-CM

## 2017-09-15 DIAGNOSIS — Z30.432 ENCOUNTER FOR IUD REMOVAL: Primary | ICD-10-CM

## 2017-09-15 LAB — BETA HCG QUAL IFA URINE: NEGATIVE

## 2017-09-15 PROCEDURE — 95117 IMMUNOTHERAPY INJECTIONS: CPT

## 2017-09-15 PROCEDURE — 58300 INSERT INTRAUTERINE DEVICE: CPT | Performed by: OBSTETRICS & GYNECOLOGY

## 2017-09-15 PROCEDURE — 58301 REMOVE INTRAUTERINE DEVICE: CPT | Performed by: OBSTETRICS & GYNECOLOGY

## 2017-09-15 PROCEDURE — 84703 CHORIONIC GONADOTROPIN ASSAY: CPT | Performed by: OBSTETRICS & GYNECOLOGY
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
## 2017-09-15 NOTE — MR AVS SNAPSHOT
After Visit Summary   9/15/2017    Maddy Ortiz    MRN: [id number]           Patient Information     Date Of Birth          1985        Visit Information        Provider Department      9/15/2017 2:15 PM ALLERGY [facility]        Today's Diagnoses     Allergic rhinitis, unspecified    -  1       Follow-ups after your visit        Your next 10 appointments already scheduled     Sep 15, 2017  1:30 PM CDT   Office Visit with Krista Keller MD   [facility]    [address] [address] [phone]           Bring a current list of meds and any records pertaining to this visit. For Physicals, please bring immunization records and any forms needing to be filled out. Please arrive 10 minutes early to complete paperwork.            Sep 15, 2017  2:15 PM CDT   Nurse Only with ALLERGY [facility] ([facility])    [address] [id number] [phone]           Every allergy patient MUST wait 30 minutes after their allergy shot. No exceptions.  Xolair shots #1-3 should plan to wait 2 hours in clinic Xolair shots after #4 should plan 30 minute wait in clinic            Sep 22, 2017 10:00 AM CDT   Women's Health Treatment with Renee Sutton PT   [facility] ([facility])    [address]  [address]  [facility] [place] [address] [phone]              Who to contact     If you have questions or need follow up information about today's clinic visit or your schedule please contact [facility] directly at [phone].  Normal or non-critical lab and imaging results will be communicated to you by MyChart, letter or phone within 4 business days after the clinic has received the results. If you do not hear from us within 7 days, please contact the clinic through MyChart or phone. If you have a critical  "or abnormal lab result, we will notify you by phone as soon as possible.  Submit refill requests through [employer] or call your pharmacy and they will forward the refill request to us. Please allow 3 business days for your refill to be completed.          Additional Information About Your Visit        [employer] Information     [employer] lets you send messages to your doctor, view your test results, renew your prescriptions, schedule appointments and more. To sign up, go to [email or web address] . Click on \"Log in\" on the left side of the screen, which will take you to the Welcome page. Then click on \"Sign up Now\" on the right side of the page.     You will be asked to enter the access code listed below, as well as some personal information. Please follow the directions to create your username and password.     Your access code is: [email or web address]  Expires: 10/18/2017  1:19 PM     Your access code will  in 90 days. If you need help or a new code, please call your [place] clinic or [phone].        Care EveryWhere ID     This is your Care EveryWhere ID. This could be used by other organizations to access your [place] medical records  [id number]         Blood Pressure from Last 3 Encounters:   17 121/81   17 122/82   17 119/83    Weight from Last 3 Encounters:   17 195 lb (88.5 kg)   17 194 lb 9.6 oz (88.3 kg)   17 192 lb 12.8 oz (87.5 kg)              We Performed the Following     Allergy Shot: Two or more injections        Primary Care Provider Office Phone #    [facility] [phone] [address] [address]        Equal Access to Services     ARMOND MCKEON : Kamryn Ackerman, pancho rondon, sally solano, lincoln fernandez. So [facility] [phone].    ATENCIÓN: Si habla español, tiene a titus disposición servicios gratuitos de asistencia lingüística. Llame al [phone].    We " comply with applicable federal civil rights laws and [place] laws. We do not discriminate on the basis of race, color, national origin, age, disability sex, sexual orientation or gender identity.            Thank you!     Thank you for choosing [facility]  for your care. Our goal is always to provide you with excellent care. Hearing back from our patients is one way we can continue to improve our services. Please take a few minutes to complete the written survey that you may receive in the mail after your visit with us. Thank you!             Your Updated Medication List - Protect others around you: Learn how to safely use, store and throw away your medicines at www.disposemymeds.org.          This list is accurate as of: 9/15/17  1:29 PM.  Always use your most recent med list.                   Brand Name Dispense Instructions for use Diagnosis    * ALLERGEN IMMUNOTHERAPY PRESCRIPTION     5 mL    Cat Hair, Standardized 10,000 BAU/mL, ALK  3.0 ml Dog Hair Dander, A. P.  1:100 w/v, HS  1.0 ml Dust Mites F 30,000AU/mL, HS  0.5 ml Dust Mites P. 30,000 AU/mL, HS  0.5 ml  Diluent: HSA qs to 5ml    Allergic rhinitis due to animal dander, Allergic rhinitis due to dust mite       * ALLERGEN IMMUNOTHERAPY PRESCRIPTION     5 mL    Alternaria Tenuis GLY 1:10 w/v, HS  0.5 ml Epicoccum Nigrum 1:10 w/v, HS 0.5 ml Hormodendrum Cladosporioides 1:10 w/v, HS 0.5 ml Diluent: HSA qs to 5ml    Allergic rhinitis due to mold       * ALLERGEN IMMUNOTHERAPY PRESCRIPTION     5 mL    Estuardo, White  GLY 1:20 w/v, HS  0.5 ml Birch Mix GLY 1:20 w/v, HS  0.5 ml Elm, American GLY 1:20 w/v, HS  0.5 ml Hackberry GLY 1:20 w/v, HS 0.5 ml Hickory, Shagbark GLY 1:20 w/v, HS  0.5 ml [place] Mix GLY 1:20 w/v, HS 0.5 ml Oak Mix RVW GLY 1:20 w/v, HS 0.5 ml [place] Tree, Black GLY 1:20 w/v, HS 0.5 ml [place], Black GLY 1:20 w/v, HS 0.5 ml Diluent: HSA qs to 5ml    Seasonal allergic rhinitis due to pollen       * ALLERGEN IMMUNOTHERAPY  PRESCRIPTION     5 mL    Kochia GLY 1:20 w/v, HS 1.0 ml Nettle GLY 1:20 w/v, HS 1.0 ml Plantain, English GLY 1:20 w/v, HS 1.0 ml Ragweed Mixed 1:20 w/v ALK  0.6 ml Russian Thistle GLY 1:20 w/v, HS 1.0 ml Diluent: HSA qs to 5ml    Seasonal allergic rhinitis due to pollen       BENADRYL PO      Take 50 mg by mouth nightly as needed for allergies or sleep        cetirizine 10 MG tablet    zyrTEC    30 tablet    Take 1 tablet (10 mg) by mouth every evening    Chronic seasonal allergic rhinitis due to pollen       cyclobenzaprine 10 MG tablet    FLEXERIL    30 tablet    Take 0.5-1 tablets (5-10 mg) by mouth At Bedtime    Sacral back pain       EPINEPHrine 0.3 MG/0.3ML injection 2-pack    EPIPEN 2-ODETTE    0.6 mL    Inject 0.3 mLs (0.3 mg) into the muscle once as needed for anaphylaxis    Need for desensitization to allergens       FLONASE 50 MCG/ACT spray   Generic drug:  fluticasone     16 g    Spray 1-2 sprays into both nostrils daily    Seasonal allergic rhinitis, unspecified allergic rhinitis trigger       lamoTRIgine 200 MG tablet    LaMICtal     Take 200 mg by mouth every morning        * permethrin 1 % Liqd     60 mL    Apply to clean, towel-dried hair, saturate hair and scalp, wash off after 10 min.    Lice       * permethrin 1 % Liqd     60 mL    Apply to clean, towel-dried hair, saturate hair and scalp, wash off after 10 min.    Head lice       ziprasidone 40 MG capsule    GEODON     Take 160 mg by mouth At Bedtime        * Notice:  This list has 6 medication(s) that are the same as other medications prescribed for you. Read the directions carefully, and ask your doctor or other care provider to review them with you.

## 2017-09-15 NOTE — NURSING NOTE
"Initial /71 (BP Location: Right arm, Cuff Size: Adult Large)  Pulse 88  Ht 5' 4.9\" (1.648 m)  Wt 190 lb 3.2 oz (86.3 kg)  LMP 09/08/2017  BMI 31.75 kg/m2 Estimated body mass index is 31.75 kg/(m^2) as calculated from the following:    Height as of this encounter: 5' 4.9\" (1.648 m).    Weight as of this encounter: 190 lb 3.2 oz (86.3 kg). .      "

## 2017-09-15 NOTE — MR AVS SNAPSHOT
"              After Visit Summary   9/15/2017    Maddy Ortiz    MRN: [id number]           Patient Information     Date Of Birth          1985        Visit Information        Provider Department      9/15/2017 1:30 PM Krista Keller MD [facility]        Today's Diagnoses     Encounter for IUD removal    -  1    Encounter for insertion of mirena IUD           Follow-ups after your visit        Your next 10 appointments already scheduled     Sep 22, 2017 10:00 AM CDT   Women's Health Treatment with Renee Sutton PT   [facility] ([facility])    [address]  [address]  [place] [id number] [phone]              Who to contact     If you have questions or need follow up information about today's clinic visit or your schedule please contact [facility] directly at [phone].  Normal or non-critical lab and imaging results will be communicated to you by MyChart, letter or phone within 4 business days after the clinic has received the results. If you do not hear from us within 7 days, please contact the clinic through MyChart or phone. If you have a critical or abnormal lab result, we will notify you by phone as soon as possible.  Submit refill requests through [employer] or call your pharmacy and they will forward the refill request to us. Please allow 3 business days for your refill to be completed.          Additional Information About Your Visit        MyChart Information     [employer] lets you send messages to your doctor, view your test results, renew your prescriptions, schedule appointments and more. To sign up, go to [email or web address] . Click on \"Log in\" on the left side of the screen, which will take you to the Welcome page. Then click on \"Sign up Now\" on the right side of the page.     You will be asked to enter the access code listed below, as well as some personal information. Please follow the directions to create " "your username and password.     Your access code is: [email or web address]  Expires: 10/18/2017  1:19 PM     Your access code will  in 90 days. If you need help or a new code, please call your [facility] or [phone].        Care EveryWhere ID     This is your Care EveryWhere ID. This could be used by other organizations to access your [place] medical records  [id number]        Your Vitals Were     Pulse Height Last Period BMI (Body Mass Index)          88 5' 4.9\" (1.648 m) 2017 31.75 kg/m2         Blood Pressure from Last 3 Encounters:   09/15/17 116/71   17 121/81   17 122/82    Weight from Last 3 Encounters:   09/15/17 190 lb 3.2 oz (86.3 kg)   17 195 lb (88.5 kg)   17 194 lb 9.6 oz (88.3 kg)              We Performed the Following     HC LEVONORGESTREL IU 52MG 5 YR     INSERTION INTRAUTERINE DEVICE     REMOVE INTRAUTERINE DEVICE          Today's Medication Changes          These changes are accurate as of: 9/15/17  2:29 PM.  If you have any questions, ask your nurse or doctor.               Start taking these medicines.        Dose/Directions    levonorgestrel 20 MCG/24HR IUD   Commonly known as:  MIRENA   Used for:  Encounter for insertion of mirena IUD   Started by:  Krista Keller MD        Dose:  1 each   1 each (20 mcg) by Intrauterine route continuous   Refills:  0         Stop taking these medicines if you haven't already. Please contact your care team if you have questions.     lamoTRIgine 200 MG tablet   Commonly known as:  LaMICtal   Stopped by:  Krista Keller MD                Where to get your medicines      Some of these will need a paper prescription and others can be bought over the counter.  Ask your nurse if you have questions.     You don't need a prescription for these medications     levonorgestrel 20 MCG/24HR IUD                Primary Care Provider Office Phone #    [facility] [phone] [address] [place] [address] [place]  [facility] [address]        Equal Access to Services     ARMOND MCKEON : Hadii aad ku hadall Sohubertali, waaxda luqadaha, qaybta karafaelada rashidjef, waxhannah paulino catrachodaniela mattson gavinchele maciel . So [facility] [phone].    ATENCIÓN: Si habla español, tiene a titus disposición servicios gratuitos de asistencia lingüística. Llame al [phone].    We comply with applicable federal civil rights laws and [place] laws. We do not discriminate on the basis of race, color, national origin, age, disability sex, sexual orientation or gender identity.            Thank you!     Thank you for choosing [facility]  for your care. Our goal is always to provide you with excellent care. Hearing back from our patients is one way we can continue to improve our services. Please take a few minutes to complete the written survey that you may receive in the mail after your visit with us. Thank you!             Your Updated Medication List - Protect others around you: Learn how to safely use, store and throw away your medicines at www.disposemymeds.org.          This list is accurate as of: 9/15/17  2:29 PM.  Always use your most recent med list.                   Brand Name Dispense Instructions for use Diagnosis    * ALLERGEN IMMUNOTHERAPY PRESCRIPTION     5 mL    Cat Hair, Standardized 10,000 BAU/mL, ALK  3.0 ml Dog Hair Dander, A. P.  1:100 w/v, HS  1.0 ml Dust Mites F 30,000AU/mL, HS  0.5 ml Dust Mites P. 30,000 AU/mL, HS  0.5 ml  Diluent: HSA qs to 5ml    Allergic rhinitis due to animal dander, Allergic rhinitis due to dust mite       * ALLERGEN IMMUNOTHERAPY PRESCRIPTION     5 mL    Alternaria Tenuis GLY 1:10 w/v, HS  0.5 ml Epicoccum Nigrum 1:10 w/v, HS 0.5 ml Hormodendrum Cladosporioides 1:10 w/v, HS 0.5 ml Diluent: HSA qs to 5ml    Allergic rhinitis due to mold       * ALLERGEN IMMUNOTHERAPY PRESCRIPTION     5 mL    Estuardo, White  GLY 1:20 w/v, HS  0.5 ml Birch Mix GLY 1:20 w/v, HS  0.5 ml Elm, American GLY 1:20  w/v, HS  0.5 ml Hackberry GLY 1:20 w/v, HS 0.5 ml Hickory, Shagbark GLY 1:20 w/v, HS  0.5 ml [place] Mix GLY 1:20 w/v, HS 0.5 ml Oak Mix RVW GLY 1:20 w/v, HS 0.5 ml [place] Tree, Black GLY 1:20 w/v, HS 0.5 ml [place], Black GLY 1:20 w/v, HS 0.5 ml Diluent: HSA qs to 5ml    Seasonal allergic rhinitis due to pollen       * ALLERGEN IMMUNOTHERAPY PRESCRIPTION     5 mL    Kochia GLY 1:20 w/v, HS 1.0 ml Nettle GLY 1:20 w/v, HS 1.0 ml Plantain, English GLY 1:20 w/v, HS 1.0 ml Ragweed Mixed 1:20 w/v ALK  0.6 ml Russian Thistle GLY 1:20 w/v, HS 1.0 ml Diluent: HSA qs to 5ml    Seasonal allergic rhinitis due to pollen       BENADRYL PO      Take 50 mg by mouth nightly as needed for allergies or sleep        cetirizine 10 MG tablet    zyrTEC    30 tablet    Take 1 tablet (10 mg) by mouth every evening    Chronic seasonal allergic rhinitis due to pollen       cyclobenzaprine 10 MG tablet    FLEXERIL    30 tablet    Take 0.5-1 tablets (5-10 mg) by mouth At Bedtime    Sacral back pain       EPINEPHrine 0.3 MG/0.3ML injection 2-pack    EPIPEN 2-ODETTE    0.6 mL    Inject 0.3 mLs (0.3 mg) into the muscle once as needed for anaphylaxis    Need for desensitization to allergens       FLONASE 50 MCG/ACT spray   Generic drug:  fluticasone     16 g    Spray 1-2 sprays into both nostrils daily    Seasonal allergic rhinitis, unspecified allergic rhinitis trigger       levonorgestrel 20 MCG/24HR IUD    MIRENA     1 each (20 mcg) by Intrauterine route continuous    Encounter for insertion of mirena IUD       * permethrin 1 % Liqd     60 mL    Apply to clean, towel-dried hair, saturate hair and scalp, wash off after 10 min.    Lice       * permethrin 1 % Liqd     60 mL    Apply to clean, towel-dried hair, saturate hair and scalp, wash off after 10 min.    Head lice       ziprasidone 40 MG capsule    GEODON     Take 160 mg by mouth At Bedtime        * Notice:  This list has 6 medication(s) that are the same as other medications prescribed for  you. Read the directions carefully, and ask your doctor or other care provider to review them with you.

## 2017-09-22 ENCOUNTER — HOSPITAL ENCOUNTER (OUTPATIENT)
Dept: PHYSICAL THERAPY | Facility: CLINIC | Age: 32
Setting detail: THERAPIES SERIES
End: 2017-09-22
Attending: UROLOGY
Payer: COMMERCIAL

## 2017-09-22 ENCOUNTER — ALLIED HEALTH/NURSE VISIT (OUTPATIENT)
Dept: ALLERGY | Facility: CLINIC | Age: 32
End: 2017-09-22
Payer: COMMERCIAL

## 2017-09-22 DIAGNOSIS — J30.9 ALLERGIC RHINITIS, UNSPECIFIED: Primary | ICD-10-CM

## 2017-09-22 PROCEDURE — 40000841 ZZH STATISTIC WOMEN'S HEALTH VISIT: Performed by: PHYSICAL THERAPIST

## 2017-09-22 PROCEDURE — 95117 IMMUNOTHERAPY INJECTIONS: CPT

## 2017-09-22 PROCEDURE — 97110 THERAPEUTIC EXERCISES: CPT | Mod: GP | Performed by: PHYSICAL THERAPIST

## 2017-09-22 NOTE — MR AVS SNAPSHOT
After Visit Summary   9/22/2017    Maddy Ortiz    MRN: [id number]           Patient Information     Date Of Birth          1985        Visit Information        Provider Department      9/22/2017 11:00 AM ALLERGY [facility]        Today's Diagnoses     Allergic rhinitis, unspecified    -  1       Follow-ups after your visit        Your next 10 appointments already scheduled     Sep 22, 2017 10:00 AM CDT   Women's Health Treatment with Renee Sutton PT   [facility] ([facility])    [address]  [address]  [facility] [place] [id number] [phone]            Sep 22, 2017 11:00 AM CDT   Nurse Only with ALLERGY [facility] ([facility])    [address] [id number] [phone]           Every allergy patient MUST wait 30 minutes after their allergy shot. No exceptions.  Xolair shots #1-3 should plan to wait 2 hours in clinic Xolair shots after #4 should plan 30 minute wait in clinic              Who to contact     If you have questions or need follow up information about today's clinic visit or your schedule please contact [facility] directly at [phone].  Normal or non-critical lab and imaging results will be communicated to you by [employer], letter or phone within 4 business days after the clinic has received the results. If you do not hear from us within 7 days, please contact the clinic through [employer] or phone. If you have a critical or abnormal lab result, we will notify you by phone as soon as possible.  Submit refill requests through [employer] or call your pharmacy and they will forward the refill request to us. Please allow 3 business days for your refill to be completed.          Additional Information About Your Visit        [employer] Information     [employer] lets you send messages to your doctor, view your test results, renew your prescriptions,  "schedule appointments and more. To sign up, go to [email or web address] . Click on \"Log in\" on the left side of the screen, which will take you to the Welcome page. Then click on \"Sign up Now\" on the right side of the page.     You will be asked to enter the access code listed below, as well as some personal information. Please follow the directions to create your username and password.     Your access code is: [email or web address]  Expires: 10/18/2017  1:19 PM     Your access code will  in 90 days. If you need help or a new code, please call your [place] clinic or [phone].        Care EveryWhere ID     This is your Care EveryWhere ID. This could be used by other organizations to access your [place] medical records  [id number]        Your Vitals Were     Last Period                   2017            Blood Pressure from Last 3 Encounters:   09/15/17 116/71   17 121/81   17 122/82    Weight from Last 3 Encounters:   09/15/17 190 lb 3.2 oz (86.3 kg)   17 195 lb (88.5 kg)   17 194 lb 9.6 oz (88.3 kg)              We Performed the Following     Allergy Shot: Two or more injections        Primary Care Provider Office Phone #    [facility] [phone] [address] [address]        Equal Access to Services     ARMOND MCKEON AH: Hadii linda ku hadasho Sohubertali, waaxda luqadaha, qaybta kaalmada adeegyada, lincoln fernandez. So [facility] [place] [phone].    ATENCIÓN: Si habla español, tiene a titus disposición servicios gratuitos de asistencia lingüística. Llame al [phone].    We comply with applicable federal civil rights laws and [place] laws. We do not discriminate on the basis of race, color, national origin, age, disability sex, sexual orientation or gender identity.            Thank you!     Thank you for choosing [facility]  for your care. Our goal is always to provide you with excellent care. Hearing " back from our patients is one way we can continue to improve our services. Please take a few minutes to complete the written survey that you may receive in the mail after your visit with us. Thank you!             Your Updated Medication List - Protect others around you: Learn how to safely use, store and throw away your medicines at www.disposemymeds.org.          This list is accurate as of: 9/22/17  9:53 AM.  Always use your most recent med list.                   Brand Name Dispense Instructions for use Diagnosis    * ALLERGEN IMMUNOTHERAPY PRESCRIPTION     5 mL    Cat Hair, Standardized 10,000 BAU/mL, ALK  3.0 ml Dog Hair Dander, A. P.  1:100 w/v, HS  1.0 ml Dust Mites F 30,000AU/mL, HS  0.5 ml Dust Mites P. 30,000 AU/mL, HS  0.5 ml  Diluent: HSA qs to 5ml    Allergic rhinitis due to animal dander, Allergic rhinitis due to dust mite       * ALLERGEN IMMUNOTHERAPY PRESCRIPTION     5 mL    Alternaria Tenuis GLY 1:10 w/v, HS  0.5 ml Epicoccum Nigrum 1:10 w/v, HS 0.5 ml Hormodendrum Cladosporioides 1:10 w/v, HS 0.5 ml Diluent: HSA qs to 5ml    Allergic rhinitis due to mold       * ALLERGEN IMMUNOTHERAPY PRESCRIPTION     5 mL    Estuardo, White  GLY 1:20 w/v, HS  0.5 ml Birch Mix GLY 1:20 w/v, HS  0.5 ml Elm, American GLY 1:20 w/v, HS  0.5 ml Hackberry GLY 1:20 w/v, HS 0.5 ml Hickory, Shagbark GLY 1:20 w/v, HS  0.5 ml [place] Mix GLY 1:20 w/v, HS 0.5 ml Oak Mix RVW GLY 1:20 w/v, HS 0.5 ml [place] Tree, Black GLY 1:20 w/v, HS 0.5 ml [place], Black GLY 1:20 w/v, HS 0.5 ml Diluent: HSA qs to 5ml    Seasonal allergic rhinitis due to pollen       * ALLERGEN IMMUNOTHERAPY PRESCRIPTION     5 mL    Kochia GLY 1:20 w/v, HS 1.0 ml Nettle GLY 1:20 w/v, HS 1.0 ml Plantain, English GLY 1:20 w/v, HS 1.0 ml Ragweed Mixed 1:20 w/v ALK  0.6 ml Russian Thistle GLY 1:20 w/v, HS 1.0 ml Diluent: HSA qs to 5ml    Seasonal allergic rhinitis due to pollen       BENADRYL PO      Take 50 mg by mouth nightly as needed for allergies or sleep         cetirizine 10 MG tablet    zyrTEC    30 tablet    Take 1 tablet (10 mg) by mouth every evening    Chronic seasonal allergic rhinitis due to pollen       cyclobenzaprine 10 MG tablet    FLEXERIL    30 tablet    Take 0.5-1 tablets (5-10 mg) by mouth At Bedtime    Sacral back pain       EPINEPHrine 0.3 MG/0.3ML injection 2-pack    EPIPEN 2-ODETTE    0.6 mL    Inject 0.3 mLs (0.3 mg) into the muscle once as needed for anaphylaxis    Need for desensitization to allergens       FLONASE 50 MCG/ACT spray   Generic drug:  fluticasone     16 g    Spray 1-2 sprays into both nostrils daily    Seasonal allergic rhinitis, unspecified allergic rhinitis trigger       levonorgestrel 20 MCG/24HR IUD    MIRENA     1 each (20 mcg) by Intrauterine route continuous    Encounter for insertion of mirena IUD       * permethrin 1 % Liqd     60 mL    Apply to clean, towel-dried hair, saturate hair and scalp, wash off after 10 min.    Lice       * permethrin 1 % Liqd     60 mL    Apply to clean, towel-dried hair, saturate hair and scalp, wash off after 10 min.    Head lice       ziprasidone 40 MG capsule    GEODON     Take 160 mg by mouth At Bedtime        * Notice:  This list has 6 medication(s) that are the same as other medications prescribed for you. Read the directions carefully, and ask your doctor or other care provider to review them with you.
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
## 2017-09-25 NOTE — ADDENDUM NOTE
Encounter addended by: Renee Sutton PT on: 9/25/2017  3:50 PM<BR>     Actions taken: Charge Capture section accepted

## 2017-09-29 ENCOUNTER — ALLIED HEALTH/NURSE VISIT (OUTPATIENT)
Dept: ALLERGY | Facility: CLINIC | Age: 32
End: 2017-09-29
Payer: COMMERCIAL

## 2017-09-29 DIAGNOSIS — J30.9 ALLERGIC RHINITIS, UNSPECIFIED: Primary | ICD-10-CM

## 2017-09-29 PROCEDURE — 99207 ZZC NO CHARGE LOS: CPT

## 2017-09-29 PROCEDURE — 95117 IMMUNOTHERAPY INJECTIONS: CPT

## 2017-09-29 NOTE — MR AVS SNAPSHOT
"              After Visit Summary   2017    Maddy Ortiz    MRN: [id number]           Patient Information     Date Of Birth          1985        Visit Information        Provider Department      2017 10:15 AM ALLERGY [facility] - [facility]        Today's Diagnoses     Allergic rhinitis, unspecified    -  1       Follow-ups after your visit        Who to contact     If you have questions or need follow up information about today's clinic visit or your schedule please contact [facility] directly at [phone].  Normal or non-critical lab and imaging results will be communicated to you by LoopIthart, letter or phone within 4 business days after the clinic has received the results. If you do not hear from us within 7 days, please contact the clinic through LoopIthart or phone. If you have a critical or abnormal lab result, we will notify you by phone as soon as possible.  Submit refill requests through [employer] or call your pharmacy and they will forward the refill request to us. Please allow 3 business days for your refill to be completed.          Additional Information About Your Visit        MyChart Information     [employer] lets you send messages to your doctor, view your test results, renew your prescriptions, schedule appointments and more. To sign up, go to [email or web address] . Click on \"Log in\" on the left side of the screen, which will take you to the Welcome page. Then click on \"Sign up Now\" on the right side of the page.     You will be asked to enter the access code listed below, as well as some personal information. Please follow the directions to create your username and password.     Your access code is: [email or web address]  Expires: 10/18/2017  1:19 PM     Your access code will  in 90 days. If you need help or a new code, please call your [facility] or [phone].        Care EveryWhere ID     This is your Care EveryWhere ID. This could be used by other " organizations to access your [facility] medical records  [id number]        Your Vitals Were     Last Period                   09/08/2017            Blood Pressure from Last 3 Encounters:   09/15/17 116/71   08/01/17 121/81   07/20/17 122/82    Weight from Last 3 Encounters:   09/15/17 190 lb 3.2 oz (86.3 kg)   08/01/17 195 lb (88.5 kg)   07/20/17 194 lb 9.6 oz (88.3 kg)              We Performed the Following     Allergy Shot: Two or more injections        Primary Care Provider Office Phone # Fax #    [facility] [phone] [address] [facility] [address]        Equal Access to Services     ARMOND MCKEON : Hadii aad ku hadasho Sohubertali, waaxda luqadaha, qaybta kaalmada adeegyada, waxay idiin haygermania fernandez. So [facility] [phone].    ATENCIÓN: Si habla español, tiene a titus disposición servicios gratuitos de asistencia lingüística. [facility] [phone].    We comply with applicable federal civil rights laws and [place] laws. We do not discriminate on the basis of race, color, national origin, age, disability sex, sexual orientation or gender identity.            Thank you!     Thank you for choosing [facility]  for your care. Our goal is always to provide you with excellent care. Hearing back from our patients is one way we can continue to improve our services. Please take a few minutes to complete the written survey that you may receive in the mail after your visit with us. Thank you!             Your Updated Medication List - Protect others around you: Learn how to safely use, store and throw away your medicines at www.disposemymeds.org.          This list is accurate as of: 9/29/17 11:13 AM.  Always use your most recent med list.                   Brand Name Dispense Instructions for use Diagnosis    * ALLERGEN IMMUNOTHERAPY PRESCRIPTION     5 mL    Cat Hair, Standardized 10,000 BAU/mL, ALK  3.0 ml Dog Hair Dander, A. P.  1:100 w/v, HS   1.0 ml Dust Mites F 30,000AU/mL, HS  0.5 ml Dust Mites P. 30,000 AU/mL, HS  0.5 ml  Diluent: HSA qs to 5ml    Allergic rhinitis due to animal dander, Allergic rhinitis due to dust mite       * ALLERGEN IMMUNOTHERAPY PRESCRIPTION     5 mL    Alternaria Tenuis GLY 1:10 w/v, HS  0.5 ml Epicoccum Nigrum 1:10 w/v, HS 0.5 ml Hormodendrum Cladosporioides 1:10 w/v, HS 0.5 ml Diluent: HSA qs to 5ml    Allergic rhinitis due to mold       * ALLERGEN IMMUNOTHERAPY PRESCRIPTION     5 mL    Estuardo, White  GLY 1:20 w/v, HS  0.5 ml Birch Mix GLY 1:20 w/v, HS  0.5 ml Elm, American GLY 1:20 w/v, HS  0.5 ml Hackberry GLY 1:20 w/v, HS 0.5 ml Hickory, Shagbark GLY 1:20 w/v, HS  0.5 ml [place] Mix GLY 1:20 w/v, HS 0.5 ml Oak Mix RVW GLY 1:20 w/v, HS 0.5 ml [place] Tree, Black GLY 1:20 w/v, HS 0.5 ml [place], Black GLY 1:20 w/v, HS 0.5 ml Diluent: HSA qs to 5ml    Seasonal allergic rhinitis due to pollen       * ALLERGEN IMMUNOTHERAPY PRESCRIPTION     5 mL    Kochia GLY 1:20 w/v, HS 1.0 ml Nettle GLY 1:20 w/v, HS 1.0 ml Plantain, English GLY 1:20 w/v, HS 1.0 ml Ragweed Mixed 1:20 w/v ALK  0.6 ml Russian Thistle GLY 1:20 w/v, HS 1.0 ml Diluent: HSA qs to 5ml    Seasonal allergic rhinitis due to pollen       BENADRYL PO      Take 50 mg by mouth nightly as needed for allergies or sleep        cetirizine 10 MG tablet    zyrTEC    30 tablet    Take 1 tablet (10 mg) by mouth every evening    Chronic seasonal allergic rhinitis due to pollen       cyclobenzaprine 10 MG tablet    FLEXERIL    30 tablet    Take 0.5-1 tablets (5-10 mg) by mouth At Bedtime    Sacral back pain       EPINEPHrine 0.3 MG/0.3ML injection 2-pack    EPIPEN 2-ODETTE    0.6 mL    Inject 0.3 mLs (0.3 mg) into the muscle once as needed for anaphylaxis    Need for desensitization to allergens       FLONASE 50 MCG/ACT spray   Generic drug:  fluticasone     16 g    Spray 1-2 sprays into both nostrils daily    Seasonal allergic rhinitis, unspecified allergic rhinitis trigger        levonorgestrel 20 MCG/24HR IUD    MIRENA     1 each (20 mcg) by Intrauterine route continuous    Encounter for insertion of mirena IUD       * permethrin 1 % Liqd     60 mL    Apply to clean, towel-dried hair, saturate hair and scalp, wash off after 10 min.    Lice       * permethrin 1 % Liqd     60 mL    Apply to clean, towel-dried hair, saturate hair and scalp, wash off after 10 min.    Head lice       ziprasidone 40 MG capsule    GEODON     Take 160 mg by mouth At Bedtime        * Notice:  This list has 6 medication(s) that are the same as other medications prescribed for you. Read the directions carefully, and ask your doctor or other care provider to review them with you.

## 2017-10-13 ENCOUNTER — ALLIED HEALTH/NURSE VISIT (OUTPATIENT)
Dept: ALLERGY | Facility: CLINIC | Age: 32
End: 2017-10-13
Payer: COMMERCIAL

## 2017-10-13 DIAGNOSIS — J30.2 SEASONAL ALLERGIC RHINITIS: Primary | ICD-10-CM

## 2017-10-13 PROCEDURE — 99207 ZZC NO CHARGE LOS: CPT

## 2017-10-13 PROCEDURE — 95117 IMMUNOTHERAPY INJECTIONS: CPT

## 2017-10-13 NOTE — MR AVS SNAPSHOT
"              After Visit Summary   10/13/2017    Maddy Ortiz    MRN: [id number]           Patient Information     Date Of Birth          1985        Visit Information        Provider Department      10/13/2017 10:00 AM ALLERGY [facility]        Today's Diagnoses     Seasonal allergic rhinitis    -  1       Follow-ups after your visit        Your next 10 appointments already scheduled     Oct 16, 2017  7:30 AM CDT   Nurse Only with ALLERGY [facility] ([facility])    [address] [facility] [address]   [phone]           Every allergy patient MUST wait 30 minutes after their allergy shot. No exceptions.  Xolair shots #1-3 should plan to wait 2 hours in clinic Xolair shots after #4 should plan 30 minute wait in clinic              Who to contact     If you have questions or need follow up information about today's clinic visit or your schedule please contact [facility] directly at [phone].  Normal or non-critical lab and imaging results will be communicated to you by Sviralhart, letter or phone within 4 business days after the clinic has received the results. If you do not hear from us within 7 days, please contact the clinic through Wi[employer] or phone. If you have a critical or abnormal lab result, we will notify you by phone as soon as possible.  Submit refill requests through [employer] or call your pharmacy and they will forward the refill request to us. Please allow 3 business days for your refill to be completed.          Additional Information About Your Visit        SviralharShmoop Information     [employer] lets you send messages to your doctor, view your test results, renew your prescriptions, schedule appointments and more. To sign up, go to [email or web address] . Click on \"Log in\" on the left side of the screen, which will take you to the Welcome page. Then click on \"Sign up Now\" on the right side of the page. "     You will be asked to enter the access code listed below, as well as some personal information. Please follow the directions to create your username and password.     Your access code is: [email or web address]  Expires: 10/18/2017  1:19 PM     Your access code will  in 90 days. If you need help or a new code, please call your [facility] clinic or [phone].        Care EveryWhere ID     This is your Care EveryWhere ID. This could be used by other organizations to access your [facility] medical records  [id number]        Your Vitals Were     Last Period                   2017            Blood Pressure from Last 3 Encounters:   09/15/17 116/71   17 121/81   17 122/82    Weight from Last 3 Encounters:   09/15/17 190 lb 3.2 oz (86.3 kg)   17 195 lb (88.5 kg)   17 194 lb 9.6 oz (88.3 kg)              We Performed the Following     Allergy Shot: Two or more injections        Primary Care Provider Office Phone # Fax #    [facility] [phone] [address] [facility] [address]        Equal Access to Services     ARMOND MCKEON AH: Hadii linda robbinso Sohubertali, waaxda luqadaha, qaybta kaalmada adeegyada, lincoln fernandez. So [facility] [phone].    ATENCIÓN: Si habla español, tiene a titus disposición servicios gratuitos de asistencia lingüística. Yadira al [phone].    We comply with applicable federal civil rights laws and [place] laws. We do not discriminate on the basis of race, color, national origin, age, disability, sex, sexual orientation, or gender identity.            Thank you!     Thank you for choosing [facility]  for your care. Our goal is always to provide you with excellent care. Hearing back from our patients is one way we can continue to improve our services. Please take a few minutes to complete the written survey that you may receive in the mail after your visit with us. Thank you!             Your  Updated Medication List - Protect others around you: Learn how to safely use, store and throw away your medicines at www.disposemymeds.org.          This list is accurate as of: 10/13/17 10:12 AM.  Always use your most recent med list.                   Brand Name Dispense Instructions for use Diagnosis    * ALLERGEN IMMUNOTHERAPY PRESCRIPTION     5 mL    Cat Hair, Standardized 10,000 BAU/mL, ALK  3.0 ml Dog Hair Dander, A. P.  1:100 w/v, HS  1.0 ml Dust Mites F 30,000AU/mL, HS  0.5 ml Dust Mites P. 30,000 AU/mL, HS  0.5 ml  Diluent: HSA qs to 5ml    Allergic rhinitis due to animal dander, Allergic rhinitis due to dust mite       * ALLERGEN IMMUNOTHERAPY PRESCRIPTION     5 mL    Alternaria Tenuis GLY 1:10 w/v, HS  0.5 ml Epicoccum Nigrum 1:10 w/v, HS 0.5 ml Hormodendrum Cladosporioides 1:10 w/v, HS 0.5 ml Diluent: HSA qs to 5ml    Allergic rhinitis due to mold       * ALLERGEN IMMUNOTHERAPY PRESCRIPTION     5 mL    Estuardo, White  GLY 1:20 w/v, HS  0.5 ml Birch Mix GLY 1:20 w/v, HS  0.5 ml Elm, American GLY 1:20 w/v, HS  0.5 ml Hackberry GLY 1:20 w/v, HS 0.5 ml Hickory, Shagbark GLY 1:20 w/v, HS  0.5 ml [place] Mix GLY 1:20 w/v, HS 0.5 ml Oak Mix RVW GLY 1:20 w/v, HS 0.5 ml [place] Tree, Black GLY 1:20 w/v, HS 0.5 ml [place], Black GLY 1:20 w/v, HS 0.5 ml Diluent: HSA qs to 5ml    Seasonal allergic rhinitis due to pollen       * ALLERGEN IMMUNOTHERAPY PRESCRIPTION     5 mL    Kochia GLY 1:20 w/v, HS 1.0 ml Nettle GLY 1:20 w/v, HS 1.0 ml Plantain, English GLY 1:20 w/v, HS 1.0 ml Ragweed Mixed 1:20 w/v ALK  0.6 ml Russian Thistle GLY 1:20 w/v, HS 1.0 ml Diluent: HSA qs to 5ml    Seasonal allergic rhinitis due to pollen       BENADRYL PO      Take 50 mg by mouth nightly as needed for allergies or sleep        cetirizine 10 MG tablet    zyrTEC    30 tablet    Take 1 tablet (10 mg) by mouth every evening    Chronic seasonal allergic rhinitis due to pollen       cyclobenzaprine 10 MG tablet    FLEXERIL    30 tablet    Take  0.5-1 tablets (5-10 mg) by mouth At Bedtime    Sacral back pain       EPINEPHrine 0.3 MG/0.3ML injection 2-pack    EPIPEN 2-ODETTE    0.6 mL    Inject 0.3 mLs (0.3 mg) into the muscle once as needed for anaphylaxis    Need for desensitization to allergens       FLONASE 50 MCG/ACT spray   Generic drug:  fluticasone     16 g    Spray 1-2 sprays into both nostrils daily    Seasonal allergic rhinitis, unspecified allergic rhinitis trigger       levonorgestrel 20 MCG/24HR IUD    MIRENA     1 each (20 mcg) by Intrauterine route continuous    Encounter for insertion of mirena IUD       * permethrin 1 % Liqd     60 mL    Apply to clean, towel-dried hair, saturate hair and scalp, wash off after 10 min.    Lice       * permethrin 1 % Liqd     60 mL    Apply to clean, towel-dried hair, saturate hair and scalp, wash off after 10 min.    Head lice       ziprasidone 40 MG capsule    GEODON     Take 160 mg by mouth At Bedtime        * Notice:  This list has 6 medication(s) that are the same as other medications prescribed for you. Read the directions carefully, and ask your doctor or other care provider to review them with you.

## 2017-11-21 ENCOUNTER — OFFICE VISIT (OUTPATIENT)
Dept: FAMILY MEDICINE | Facility: CLINIC | Age: 32
End: 2017-11-21
Payer: COMMERCIAL

## 2017-11-21 ENCOUNTER — RADIANT APPOINTMENT (OUTPATIENT)
Dept: GENERAL RADIOLOGY | Facility: CLINIC | Age: 32
End: 2017-11-21
Attending: NURSE PRACTITIONER
Payer: COMMERCIAL

## 2017-11-21 VITALS
SYSTOLIC BLOOD PRESSURE: 112 MMHG | HEART RATE: 94 BPM | DIASTOLIC BLOOD PRESSURE: 81 MMHG | HEIGHT: 65 IN | OXYGEN SATURATION: 96 % | TEMPERATURE: 97.2 F | RESPIRATION RATE: 16 BRPM | WEIGHT: 194.8 LBS | BODY MASS INDEX: 32.46 KG/M2

## 2017-11-21 DIAGNOSIS — M54.50 RIGHT-SIDED LOW BACK PAIN WITHOUT SCIATICA, UNSPECIFIED CHRONICITY: ICD-10-CM
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
## 2017-11-21 DIAGNOSIS — R25.2 CRAMP OF LIMB: ICD-10-CM

## 2017-11-21 DIAGNOSIS — M54.50 RIGHT-SIDED LOW BACK PAIN WITHOUT SCIATICA, UNSPECIFIED CHRONICITY: Primary | ICD-10-CM

## 2017-11-21 DIAGNOSIS — Z23 NEED FOR PROPHYLACTIC VACCINATION AND INOCULATION AGAINST INFLUENZA: ICD-10-CM

## 2017-11-21 LAB
ERYTHROCYTE [DISTWIDTH] IN BLOOD BY AUTOMATED COUNT: 11.8 % (ref 10–15)
FERRITIN SERPL-MCNC: 88 NG/ML (ref 12–150)
HCT VFR BLD AUTO: 41.1 % (ref 35–47)
HGB BLD-MCNC: 14 G/DL (ref 11.7–15.7)
IRON SATN MFR SERPL: 31 % (ref 15–46)
IRON SERPL-MCNC: 92 UG/DL (ref 35–180)
MAGNESIUM SERPL-MCNC: 2.3 MG/DL (ref 1.6–2.3)
MCH RBC QN AUTO: 30 PG (ref 26.5–33)
MCHC RBC AUTO-ENTMCNC: 34.1 G/DL (ref 31.5–36.5)
MCV RBC AUTO: 88 FL (ref 78–100)
PLATELET # BLD AUTO: 260 10E9/L (ref 150–450)
POTASSIUM SERPL-SCNC: 3.9 MMOL/L (ref 3.4–5.3)
RBC # BLD AUTO: 4.66 10E12/L (ref 3.8–5.2)
TIBC SERPL-MCNC: 293 UG/DL (ref 240–430)
WBC # BLD AUTO: 8 10E9/L (ref 4–11)

## 2017-11-21 PROCEDURE — 83550 IRON BINDING TEST: CPT | Performed by: NURSE PRACTITIONER

## 2017-11-21 PROCEDURE — 85027 COMPLETE CBC AUTOMATED: CPT | Performed by: NURSE PRACTITIONER

## 2017-11-21 PROCEDURE — 90471 IMMUNIZATION ADMIN: CPT | Performed by: NURSE PRACTITIONER

## 2017-11-21 PROCEDURE — 90686 IIV4 VACC NO PRSV 0.5 ML IM: CPT | Performed by: NURSE PRACTITIONER

## 2017-11-21 PROCEDURE — 36415 COLL VENOUS BLD VENIPUNCTURE: CPT | Performed by: NURSE PRACTITIONER

## 2017-11-21 PROCEDURE — 84132 ASSAY OF SERUM POTASSIUM: CPT | Performed by: NURSE PRACTITIONER

## 2017-11-21 PROCEDURE — 99214 OFFICE O/P EST MOD 30 MIN: CPT | Performed by: NURSE PRACTITIONER

## 2017-11-21 PROCEDURE — 82728 ASSAY OF FERRITIN: CPT | Performed by: NURSE PRACTITIONER

## 2017-11-21 PROCEDURE — 83540 ASSAY OF IRON: CPT | Performed by: NURSE PRACTITIONER

## 2017-11-21 PROCEDURE — 72100 X-RAY EXAM L-S SPINE 2/3 VWS: CPT

## 2017-11-21 PROCEDURE — 83735 ASSAY OF MAGNESIUM: CPT | Performed by: NURSE PRACTITIONER

## 2017-11-21 NOTE — NURSING NOTE
"Chief Complaint   Patient presents with     Back Pain       Initial /81 (BP Location: Left arm, Patient Position: Sitting)  Pulse 94  Temp 97.2  F (36.2  C) (Tympanic)  Resp 16  Ht 5' 4.9\" (1.648 m)  Wt 194 lb 12.8 oz (88.4 kg)  SpO2 96%  BMI 32.52 kg/m2 Estimated body mass index is 32.52 kg/(m^2) as calculated from the following:    Height as of this encounter: 5' 4.9\" (1.648 m).    Weight as of this encounter: 194 lb 12.8 oz (88.4 kg).  Medication Reconciliation: complete  "

## 2017-11-21 NOTE — LETTER
November 21, 2017      Maddy Ortiz  [address] [address]        Dear ,    We are writing to inform you of your test results.  HI Maddy,    Here is a copy of your recent labs for your records. The blood counts and iron levels are normal, so signs of anemia or infection. The magnesium and potassium levels are normal. Ensure you are drinking enough water. Try adding the B-complex vitamin and doing stretching of the calves before bed. If your symptoms persist please follow back up in clinic. Please let us know if you have any questions.  Component      Latest Ref Rng & Units 11/21/2017   WBC      4.0 - 11.0 10e9/L 8.0   RBC Count      3.8 - 5.2 10e12/L 4.66   Hemoglobin      11.7 - 15.7 g/dL 14.0   Hematocrit      35.0 - 47.0 % 41.1   MCV      78 - 100 fl 88   MCH      26.5 - 33.0 pg 30.0   MCHC      31.5 - 36.5 g/dL 34.1   RDW      10.0 - 15.0 % 11.8   Platelet Count      150 - 450 10e9/L 260   Iron      35 - 180 ug/dL 92   Iron Binding Cap      240 - 430 ug/dL 293   Iron Saturation Index      15 - 46 % 31   Ferritin      12 - 150 ng/mL 88   Potassium      3.4 - 5.3 mmol/L 3.9   Magnesium      1.6 - 2.3 mg/dL 2.3     If you have any questions or concerns, please call the clinic at the number listed above.       Sincerely,        PHILL Rodriguez CNP

## 2017-11-21 NOTE — PROGRESS NOTES
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
## 2017-11-21 NOTE — PATIENT INSTRUCTIONS
Recommend to start B complex vitamin, this is available over the counter. Perform calf stretches every night before bed.   Exercises to Strengthen Your Lower Back  Strong lower back and abdominal muscles work together to support your spine. The exercises below will help strengthen the lower back. It is important that you begin exercising slowly and increase levels gradually.  Always begin any exercise program with stretching. If you feel pain while doing any of these exercises, stop and talk to your doctor about a more specific exercise program that better suits your condition.   Low back stretch  The point of stretching is to make you more flexible and increase your range of motion. Stretch only as much as you are able. Stretch slowly. Do not push your stretch to the limit. If at any point you feel pain while stretching, this is your (temporary) limit.    Lie on your back with your knees bent and both feet on the ground.    Slowly raise your left knee to your chest as you flatten your lower back against the floor. Hold for 5 seconds.    Relax and repeat the exercise with your right knee.    Do 10 of these exercises for each leg.    Repeat hugging both knees to your chest at the same time.  Building lower back strength  Start your exercise routine with 10 to 30 minutes a day, 1 to 3 times a day.  Initial exercises  Lying on your back:  1. Ankle pumps: Move your foot up and down, towards your head, and then away. Repeat 10 times with each foot.  2. Heel slides: Slowly bend your knee, drawing the heel of your foot towards you. Then slide your heel/foot from you, straightening your knee. Do not lift your foot off the floor (this is not a leg lift).  3. Abdominal contraction: Bend your knees and put your hands on your stomach. Tighten your stomach muscles. Hold for 5 seconds, then relax. Repeat 10 times.  4. Straight leg raise: Bend one leg at the knee and keep the other leg straight. Tighten your stomach muscles.  Slowly lift your straight leg 6 to 12 inches off the floor and hold for up to 5 seconds. Repeat 10 times on each side.  Standin. Wall squats: Stand with your back against the wall. Move your feet about 12 inches away from the wall. Tighten your stomach muscles, and slowly bend your knees until they are at about a 45 degree angle. Do not go down too far. Hold about 5 seconds. Then slowly return to your starting position. Repeat 10 times.  2. Heel raises: Stand facing the wall. Slowly raise the heels of your feet up and down, while keeping your toes on the floor. If you have trouble balancing, you can touch the wall with your hands. Repeat 10 times.  More advanced exercises  When you feel comfortable enough, try these exercises.  1. Kneeling lumbar extension: Begin on your hands and knees. At the same time, raise and straighten your right arm and left leg until they are parallel to the ground. Hold for 2 seconds and come back slowly to a starting position. Repeat with left arm and right leg, alternating 10 times.  2. Prone lumbar extension: Lie face down, arms extended overhead, palms on the floor. At the same time, raise your right arm and left leg as high as comfortably possible. Hold for 10 seconds and slowly return to start. Repeat with left arm and right leg, alternating 10 times. Gradually build up to 20 times. (Advanced: Repeat this exercise raising both arms and both legs a few inches off the floor at the same time. Hold for 5 seconds and release.)  3. Pelvic tilt: Lie on the floor on your back with your knees bent at 90 degrees. Your feet should be flat on the floor. Inhale, exhale, then slowly contract your abdominal muscles bringing your navel toward your spine. Let your pelvis rock back until your lower back is flat on the floor. Hold for 10 seconds while breathing smoothly.  4. Abdominal crunch: Perform a pelvic tilt (above) flattening your lower back against the floor. Holding the tension in your  abdominal muscles, take another breath and raise your shoulder blades off the ground (this is not a full sit-up). Keep your head in line with your body (don t bend your neck forward). Hold for 2 seconds, then slowly lower.  Date Last Reviewed: 6/1/2016 2000-2017 [employer]. [address] [place] [address]. All rights reserved. This information is not intended as a substitute for professional medical care. Always follow your healthcare professional's instructions.        Causes of Lumbar (Low Back) Pain  Low back pain can be caused by problems with any part of the lumbar spine. A disk can herniate (push out) and press on a nerve. Vertebrae can rub against each other or slip out of place. This can irritate facet joints and nerves. It can also lead to stenosis, a narrowing of the spinal canal or foramen.  Pressure from a disk  Constant wear and tear on a disk can cause it to weaken and push outward. Part of the disk may then press on nearby nerves. There are two common types of herniated disks:  Contained means the soft nucleus is protruding outward.   Extruded means the firm annulus has torn, letting the soft center squeeze through.     Pressure from bone  An unstable spine   With age, a disk may thin and wear out. Vertebrae above and below the disk may begin to touch. This can put pressure on nerves. It can also cause bone spurs (growths) to form where the bones rub together.    Stenosis results when bone spurs narrow the foramen or spinal canal. This also puts pressure on nerves. Slipping vertebrae can irritate nerves and joints. They can also worsen stenosis.    In some cases, vertebrae become unstable and slip forward. This is called spondylolisthesis.     Date Last Reviewed: 10/12/2015    [id number] [employer]. [address] [place] [id number]. All rights reserved. This information is not intended as a substitute for professional medical care. Always follow your  healthcare professional's instructions.        Leg Cramps  A muscle cramp or spasm is a strong contraction of the muscle fibers. It is also called a charley horse. This may occur in the foot, calf, or thigh at night when the legs are elevated. If the spasm is prolonged, it can become very painful.  This may be caused by sleeping in an uncomfortable position, muscle fatigue, poor muscle tone from lack of exercise and stretching, dehydration, electrolyte imbalance, diabetes, alcohol use, and certain medicine.  Home care    Drink plenty of fluids during the day to prevent dehydration.    Stretch your legs before bedtime.    Eat a diet high in potassium. These foods include fresh fruit, such as bananas, oranges, cantaloupe, and honeydew melon. It also includes apple, prune, orange, grape and pineapple juices. Other foods high in potassium are white, red, and zavala beans, baked potatoes, raw spinach, cod, flounder, halibut, salmon, and scallops.    Talk with your healthcare provider about taking mineral and vitamin supplements that contain magnesium and vitamin B-12 if you are not already taking these. Other prescription medicines may also be used.    Avoid stimulants such as caffeine, nicotine, decongestants.  How to relieve an acute leg cramp    For mild pain, getting out of the bed and walking may help. Some people find relief with heat and massage. You can apply heat with a warm shower, bath, or compress. Some people feel better with a cold packs. You can make an ice pack by filling a plastic bag that seals at the top with ice cubes and then wrapping it with a thin towel. Try both and use the method that feels best for 15 to 20 minutes at a time.    For severe pain, stretching the muscle that is in spasm may quickly relieve the pain.    When the spasm is in your foot, your toes may curl up or down. To stretch the muscle in spasm, bend your toes in the opposite direction. If the spasm pulls your toes up, bend them  down. If the spasm pulls them down, bend them up.    When the spasm is in your calf, bend the ankle so the foot points upward toward your knee.    When the spasm is in your thigh, bend or straighten the knee and hip until you feel relief.  Follow-up care  Follow up with your healthcare provider, or as advised.  When to seek medical advice  Call your healthcare provider right away if any of these occur:    Walking makes your pain worse and rest makes it better    You develop weakness in the affected leg    Pain or frequency of spasms increases and is not controlled by the above measures  Date Last Reviewed: 11/23/2015 2000-2017 [employer]. [address] [place] [address], [place] [address]. All rights reserved. This information is not intended as a substitute for professional medical care. Always follow your healthcare professional's instructions.
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
## 2017-12-01 ENCOUNTER — TELEPHONE (OUTPATIENT)
Dept: ALLERGY | Facility: CLINIC | Age: 32
End: 2017-12-01

## 2017-12-01 DIAGNOSIS — J30.81 ALLERGIC RHINITIS DUE TO ANIMAL DANDER: ICD-10-CM

## 2017-12-01 DIAGNOSIS — J30.1 CHRONIC SEASONAL ALLERGIC RHINITIS DUE TO POLLEN: ICD-10-CM

## 2017-12-01 DIAGNOSIS — J30.89 ALLERGIC RHINITIS DUE TO DUST MITE: ICD-10-CM

## 2017-12-01 DIAGNOSIS — J30.89 ALLERGIC RHINITIS DUE TO MOLD: ICD-10-CM

## 2017-12-01 NOTE — TELEPHONE ENCOUNTER
Please contact patient and inquire as to whether or not she plans to continue allergy shots. I would have to send new serum. Thanks.

## 2017-12-01 NOTE — TELEPHONE ENCOUNTER
ALLERGY SOLUTION RE-ORDER REQUEST    Maddy Ortiz 1985 MRN: [id number]    DATE NEEDED:  ASAP  Vial Color  Content   Top Dose   Last Dose  Vial Size  Blue 1:100, Yellow 1:10 and Red 1:1 Cat, Dog, Dust Mite Red 1:1 0.5   Yellow 1:100.25 5mL each  Blue 1:100, Yellow 1:10 and Red 1:1 Trees    Red 1:1 0.5   Yellow 1:100.25 5mL each  Blue 1:100, Yellow 1:10 and Red 1:1 Molds    Red 1:1 0.5   Yellow 1:100.25 5mL each  Blue 1:100, Yellow 1:10 and Red 1:1 Weeds    Red 1:1 0.5   Yellow 1:100.25 5mL each      Shot Clinic Location:  [place]  Ship to Location: [place]  Special Instructions:  Call pt when serum arrives      Updated Prescription Needed: No      Requester Signature  Kenney Pena

## 2017-12-01 NOTE — TELEPHONE ENCOUNTER
Please have her restart at the beginning of the blue vial. Obviously new serum will need to be reordered and made. Thanks.     Magdy Blevins

## 2017-12-01 NOTE — TELEPHONE ENCOUNTER
Mariella CALLAHAN Obtained verbal consent and Kelby ELI Witnessed to reorder serum per pt.  Mariella CALLAHAN updated flow sheets and Kelby ELI ordered serum.    Call pt when serum arrives.    Kenney Pena / JACKELYN

## 2017-12-01 NOTE — TELEPHONE ENCOUNTER
Spoke with patient and informed that all of her allergy serums have .  She states she has been very busy with work and school.  She thinks she will have more time after 17 and would like to start allergy shots again after this date.  Patient last received 0.25 ml of yellow for all shots on 10/13/17.  Please advise for restart dose.  Shirley Grant RN

## 2017-12-01 NOTE — TELEPHONE ENCOUNTER
Last OV with Dr. Blevins was 17.  Patient's serums discarded at the [place] [facility] since they have  (on 17).  Shirley Grant RN

## 2017-12-20 ENCOUNTER — TELEPHONE (OUTPATIENT)
Dept: ALLERGY | Facility: CLINIC | Age: 32
End: 2017-12-20

## 2017-12-20 NOTE — TELEPHONE ENCOUNTER
Reviewed patient chart, she has started her allergen immunotherapy, but had difficulty maintaining the building schedule. Contacted patient, and discussed.  She is very interested in cluster or rush immunotherapy, and would like to discuss how to get started. Advised that she would need to follow up with the provider to discuss the risks and benefits, as well as premedicating and getting scheduled. She verbalized understanding to this, and a return visit was scheduled with the provider for this Friday. 12/22/17.   Nadja Bone RN ............   12/20/2017...1:19 PM

## 2017-12-20 NOTE — TELEPHONE ENCOUNTER
Reason for call:  Other   Patient called regarding (reason for call): call back  Additional comments: Patient walked in , [facility] referred her to receive Cluster Immunotherapy. Please call patient at [phone]      Phone number to reach patient:  Home number on file [phone] (home)    Best Time:  Anytime    Can we leave a detailed message on this number?  YES

## 2017-12-22 ENCOUNTER — OFFICE VISIT (OUTPATIENT)
Dept: ALLERGY | Facility: CLINIC | Age: 32
End: 2017-12-22
Payer: COMMERCIAL

## 2017-12-22 VITALS
DIASTOLIC BLOOD PRESSURE: 89 MMHG | SYSTOLIC BLOOD PRESSURE: 129 MMHG | TEMPERATURE: 97.5 F | OXYGEN SATURATION: 96 % | BODY MASS INDEX: 33.15 KG/M2 | WEIGHT: 199 LBS | HEIGHT: 65 IN | HEART RATE: 116 BPM | RESPIRATION RATE: 20 BRPM

## 2017-12-22 DIAGNOSIS — J30.81 ALLERGIC RHINITIS DUE TO ANIMAL DANDER: ICD-10-CM

## 2017-12-22 DIAGNOSIS — R06.02 SOB (SHORTNESS OF BREATH): Primary | ICD-10-CM

## 2017-12-22 DIAGNOSIS — J30.89 ALLERGIC RHINITIS DUE TO DUST MITE: ICD-10-CM

## 2017-12-22 DIAGNOSIS — J30.1 CHRONIC SEASONAL ALLERGIC RHINITIS DUE TO POLLEN: ICD-10-CM

## 2017-12-22 DIAGNOSIS — J30.89 ALLERGIC RHINITIS DUE TO MOLD: ICD-10-CM

## 2017-12-22 DIAGNOSIS — J30.2 CHRONIC SEASONAL ALLERGIC RHINITIS DUE TO OTHER ALLERGEN: Primary | ICD-10-CM

## 2017-12-22 PROCEDURE — 95165 ANTIGEN THERAPY SERVICES: CPT | Performed by: ALLERGY & IMMUNOLOGY

## 2017-12-22 PROCEDURE — 95165 ANTIGEN THERAPY SERVICES: CPT | Mod: 59 | Performed by: ALLERGY & IMMUNOLOGY

## 2017-12-22 PROCEDURE — 99214 OFFICE O/P EST MOD 30 MIN: CPT | Performed by: ALLERGY & IMMUNOLOGY

## 2017-12-22 RX ORDER — FLUTICASONE PROPIONATE 50 MCG
1-2 SPRAY, SUSPENSION (ML) NASAL DAILY
Qty: 16 G | Refills: 11 | Status: ON HOLD | OUTPATIENT
Start: 2017-12-22 | End: 2018-05-02

## 2017-12-22 RX ORDER — ALBUTEROL SULFATE 90 UG/1
2 AEROSOL, METERED RESPIRATORY (INHALATION) EVERY 4 HOURS PRN
Qty: 1 INHALER | Refills: 3 | Status: ON HOLD | OUTPATIENT
Start: 2017-12-22 | End: 2018-05-02

## 2017-12-22 NOTE — LETTER
12/22/2017         RE: Maddy Ortiz  [address]    [facility] [address]        Dear Colleague,    Thank you for referring your patient, Maddy Ortiz, to the [facility]. Please see a copy of my visit note below.    Maddy Ortiz is a 32 year old  female with previous medical history significant for allergic rhinitis who returns for a follow up visit. Maddy Ortiz is being seen today for asthma and seasonal allergies.     Patient returns for follow-up.  She was started on allergen immunotherapy for cat, dog, dust, mold, trees and weeds.  She is tolerating allergen immunotherapy.  However, she has difficulty coming in to get her weekly allergy shots.  She is wanting to switch to cluster immunotherapy.  Her last allergy shot was 12/1/2017.  Prior to that it was in October 2017.  She will have sneezing and congestion around pets.  She works as an .  She will of increased symptoms in the spring and the fall.  She is not using Flonase.  She will use oral antihistamine on a as needed basis.  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  She denies chest tightness, coughing or wheezing.  She does not have an albuterol inhaler.  No other triggers of symptoms.    Past Medical History:   Diagnosis Date     Bipolar 1 disorder (H) 12/6/2012     Paranoid type delusional disorder (H) 11/14/2012     Family History   Problem Relation Age of Onset     Adopted: Yes     Unknown/Adopted Mother      Unknown/Adopted Father      Unknown/Adopted Other      Past Surgical History:   Procedure Laterality Date     TONSILLECTOMY & ADENOIDECTOMY      as a child       REVIEW OF SYSTEMS:  General: positive  for weight gain. negative for weight loss. negative for changes in sleep.   Ears: negative for fullness. negative for hearing loss. negative for dizziness.   Nose: negative for snoring.negative for changes in smell. negative  for drainage.   Throat: negative for hoarseness. negative for sore throat. negative for trouble swallowing.   Lungs: negative for shortness of breath.negative for wheezing. negative for sputum production.   Cardiovascular: negative for chest pain. negative for swelling of ankles. negative for fast or irregular heartbeat.   Gastrointestinal: negative for nausea. negative for heartburn. negative for acid reflux.   Musculoskeletal: negative for joint pain. negative for joint stiffness. negative for joint swelling.   Neurologic: negative for seizures. negative for fainting. negative for weakness.   Psychiatric: negative for changes in mood. negative for anxiety.   Endocrine: negative for cold intolerance. negative for heat intolerance. negative for tremors.   Hematologic: negative for easy bruising. negative for easy bleeding.  Integumentary: negative for rash. negative for scaling. negative for nail changes.       Current Outpatient Prescriptions:      albuterol (PROAIR HFA/PROVENTIL HFA/VENTOLIN HFA) 108 (90 BASE) MCG/ACT Inhaler, Inhale 2 puffs into the lungs every 4 hours as needed, Disp: 1 Inhaler, Rfl: 3     fluticasone (FLONASE) 50 MCG/ACT spray, Spray 1-2 sprays into both nostrils daily, Disp: 16 g, Rfl: 11     levonorgestrel (MIRENA) 20 MCG/24HR IUD, 1 each (20 mcg) by Intrauterine route continuous, Disp: , Rfl:      DiphenhydrAMINE HCl (BENADRYL PO), Take 50 mg by mouth nightly as needed for allergies or sleep, Disp: , Rfl:      EPINEPHrine (EPIPEN 2-ODETTE) 0.3 MG/0.3ML injection, Inject 0.3 mLs (0.3 mg) into the muscle once as needed for anaphylaxis, Disp: 0.6 mL, Rfl: 3     ziprasidone (GEODON) 40 MG capsule, Take 20 mg by mouth At Bedtime , Disp: , Rfl: 5     ORDER FOR ALLERGEN IMMUNOTHERAPY, Cat Hair, Standardized 10,000 BAU/mL, ALK  3.0 ml Dog Hair Dander, A. P.  1:100 w/v, HS  1.0 ml Dust Mites F 30,000AU/mL, HS  0.5 ml Dust Mites P. 30,000 AU/mL, HS  0.5 ml  Diluent: HSA qs to 5ml (Patient not taking:  Reported on 12/22/2017), Disp: 5 mL, Rfl: PRN     ORDER FOR ALLERGEN IMMUNOTHERAPY, Alternaria Tenuis 1:10 w/v, HS  0.5 ml Epicoccum Nigrum 1:10 w/v, HS 0.5 ml Hormodendrum Cladosporioides 1:10 w/v, HS 0.5 ml Diluent: HSA qs to 5ml (Patient not taking: Reported on 12/22/2017), Disp: 5 mL, Rfl: PRN     ORDER FOR ALLERGEN IMMUNOTHERAPY, Estuardo, White  1:20 w/v, HS  0.5 ml Birch Mix PRW 1:20 w/v, HS  0.5 ml Elm, American 1:20 w/v, HS  0.5 ml Hackberry 1:20 w/v, HS 0.5 ml Hickory, Shagbark 1:20 w/v, HS  0.5 ml [place] Mix 1:20 w/v, HS 0.5 ml Oak Mix RVW 1:20 w/v, HS 0.5 ml [place] Tree, Black 1:20 w/v, HS 0.5 ml [place], Black 1:20 w/v, HS 0.5 ml Diluent: HSA qs to 5ml (Patient not taking: Reported on 12/22/2017), Disp: 5 mL, Rfl: PRN     ORDER FOR ALLERGEN IMMUNOTHERAPY, Kochia 1:20 w/v, HS 1.0 ml Nettle 1:20 w/v, HS 1.0 ml Plantain, English 1:20 w/v, HS 1.0 ml Ragweed Mixed 1:20 w/v ALK  0.6 ml Russian Thistle 1:20 w/v, HS 1.0 ml Diluent: HSA qs to 5ml (Patient not taking: Reported on 12/22/2017), Disp: 5 mL, Rfl: PRN     cyclobenzaprine (FLEXERIL) 10 MG tablet, Take 0.5-1 tablets (5-10 mg) by mouth At Bedtime (Patient not taking: Reported on 8/1/2017), Disp: 30 tablet, Rfl: 0     permethrin 1 % LIQD, Apply to clean, towel-dried hair, saturate hair and scalp, wash off after 10 min. (Patient not taking: Reported on 9/15/2017), Disp: 60 mL, Rfl: 1     cetirizine (ZYRTEC) 10 MG tablet, Take 1 tablet (10 mg) by mouth every evening (Patient not taking: Reported on 8/1/2017), Disp: 30 tablet, Rfl: 11  Immunization History   Administered Date(s) Administered     Influenza (IIV3) PF 12/06/2012     Influenza Vaccine IM 3yrs+ 4 Valent IIV4 11/21/2017     TDAP Vaccine (Adacel) 12/06/2012     Allergies   Allergen Reactions     Animal Dander      Nkda [No Known Drug Allergies]      Seasonal Allergies      Weeds and mold         EXAM:   Constitutional:  Appears well-developed and well-nourished. No distress.   HEENT:   Head:  Normocephalic.   Mouth/Throat: No oropharyngeal exudate present.   Boggy nasal tissue and pale.    Eyes: Conjunctivae are non-erythematous   Cardiovascular: Normal rate, regular rhythm and normal heart sounds. Exam reveals no gallop and no friction rub.   No murmur heard.  Respiratory: Effort normal and breath sounds normal. No respiratory distress. No wheezes. No rales.   Musculoskeletal: Normal range of motion.    Neuro: Oriented to person, place, and time.  Skin: Skin is warm and dry. No rash noted.   Psychiatric: Normal mood and affect.     Nursing note and vitals reviewed.    ASSESSMENT/PLAN:  Problem List Items Addressed This Visit        Respiratory    Seasonal allergic rhinitis due to pollen     History of nasal and ocular symptoms for multiple years. Perennial with spring and fall worsening. Tried cetirizine and loratadine which were helpful. Currently used Allegra as needed. Allergy testing done in 2013 positive for dust mite, cat, dog, molds, trees, grasses and weeds.       Skin testing  Positive for cat, dog, dust mites, weeds, trees and molds.       - Flonase 2 spray/nostril daily. Use spring and fall at the very least.   - Allegra or Zyrtec as needed and on allergy shot days.    - Continue allergen immunotherapy. Starting cluster immunotherapy. Discussed protocol in depth. Discussed premedication regime with patient.          Relevant Medications    albuterol (PROAIR HFA/PROVENTIL HFA/VENTOLIN HFA) 108 (90 BASE) MCG/ACT Inhaler    fluticasone (FLONASE) 50 MCG/ACT spray    Allergic rhinitis due to mold    Relevant Medications    albuterol (PROAIR HFA/PROVENTIL HFA/VENTOLIN HFA) 108 (90 BASE) MCG/ACT Inhaler    fluticasone (FLONASE) 50 MCG/ACT spray    Allergic rhinitis due to animal dander    Relevant Medications    albuterol (PROAIR HFA/PROVENTIL HFA/VENTOLIN HFA) 108 (90 BASE) MCG/ACT Inhaler    fluticasone (FLONASE) 50 MCG/ACT spray    Allergic rhinitis due to dust mite    Relevant Medications     albuterol (PROAIR HFA/PROVENTIL HFA/VENTOLIN HFA) 108 (90 BASE) MCG/ACT Inhaler    fluticasone (FLONASE) 50 MCG/ACT spray    SOB (shortness of breath) - Primary     Shortness of breath around pet dander.  Likely allergic asthma.  I am going to prescribe her an albuterol inhaler that she can use to ascertain if beneficial or not.    Albuterol 2-4 puffs inhaled (use a spacer unless using a Proair Respiclick device) every 4 hours as needed for chest tightness, wheezing, shortness of breath and/or coughing.            Relevant Medications    albuterol (PROAIR HFA/PROVENTIL HFA/VENTOLIN HFA) 108 (90 BASE) MCG/ACT Inhaler    fluticasone (FLONASE) 50 MCG/ACT spray          Chart documentation with Dragon Voice recognition Software. Although reviewed after completion, some words and grammatical errors may remain.    Magdy Blevins,    Allergy/Immunology  [facility]        Again, thank you for allowing me to participate in the care of your patient.        Sincerely,        Magdy Blevins, DO

## 2017-12-22 NOTE — MR AVS SNAPSHOT
After Visit Summary   12/22/2017    Maddy Ortiz    MRN: [id number]           Patient Information     Date Of Birth          1985        Visit Information        Provider Department      12/22/2017 3:40 PM Magdy Blevins DO [facility]        Today's Diagnoses     SOB (shortness of breath)    -  1    Allergic rhinitis due to animal dander        Allergic rhinitis due to dust mite        Allergic rhinitis due to mold        Chronic seasonal allergic rhinitis due to pollen          Care Instructions    Allergy Staff Appt Hours Shot Hours Locations    Physician     Magdy Blevins DO       Support Staff     MARY ANNE Meneses MA  Monday:                      [place] 8-7     Tuesday:         [place] 8-5     Wednesday:        [place]: 7-5     Friday:        [place] 7-5   [place]        Monday: 9-6 Friday: 7-2     [place]        Tuesday: 7-10:45        Thursday: 1:30-6:30     [place]        Tuesday: 1-7        Wednesday: 11-6 Thursday: 7-12 [facility]  [address] [place] [address]  Appt Line: [phone]  Allergy RN (Monday):  [phone]    [facility]  [address] [place] [address]  Appt Line: [phone]  Allergy RN (Tues & Wed):  [phone]    [facility]  [address] [place] [address]  Appt Line: [phone]  Allergy RN (Friday):  [phone]       Important Scheduling Information  Aspirin Desensitization: Appt will last 2 clinic days. Please call the Allergy RN line for your clinic to schedule. Discontinue antihistamines 7 days prior to the appointment.     Food Challenges: Appt will last 3-4 hours. Please call the Allergy RN line for your clinic to schedule. Discontinue antihistamines 7 days prior to the appointment.     Penicillin Testing: Appt will last 2-3 hours. Please call the Allergy RN line for your clinic to schedule. Discontinue antihistamines 7 days prior to the appointment.      Skin Testing: Appt will about 40 minutes. Call the appointment line for your clinic to schedule. Discontinue antihistamines 7 days prior to the appointment.     Venom Testing: Appt will last 2-3 hours. Please call the Allergy RN line for your clinic to schedule. Discontinue antihistamines 7 days prior to the appointment.     Thank you for trusting us with your Allergy, Asthma, and Immunology care. Please feel free to contact us with any questions or concerns you may have.      - Albuterol 2-4 puffs inhaled (use a spacer unless using a Proair Respiclick device) every 4 hours as needed for chest tightness, wheezing, shortness of breath and/or coughing.   - Zyrtec as needed.   - Flonase 2 sprays/nostril daily in spring and fall.   - Start Cluster allergy shots.     ACCELERATED IMMUNOTHERAPY PATIENT INFORMATION    Immunotherapy is a treatment that alters the patient s immune system so they have less allergy symptoms, use less medications to control symptoms, improved quality of life, and less health care utilization    Accelerated immunotherapy schedules are designed to allow patients to reach their maintenance immunotherapy dose in a shorter time frame than conventional immunotherapy.    Clinical benefit can be reached more rapidly using accelerated immunotherapy.     A lot of patients do not want to start allergen immunotherapy secondary to the upfront time commitment associated with conventional allergy shots. Rush immunotherapy would allow a patient to be on monthly allergy shots within 6-10 weeks. Cluster immunotherapy would allow the patient to be on monthly injections around 8-9 weeks.     Rush immunotherapy has historically been associated with an increased risk of reactions, however the risk is substantially lowered by only advancing on RUSH immunotherapy day to the mid-yellow vial, using a pre-medication regimen consisting of steroids and antihistamines, and making sure asthma is well controlled the RUSH  immunotherapy day. This puts the risk of a systemic reaction similar to conventional immunotherapy. Cluster immunotherapy is similar in the risk of systemic reaction to conventional immunotherapy. The patient would still need to take oral antihistamine on cluster immunotherapy days.    CLUSTER IMMUNOTHERAPY PATIENT INSTRUCTIONS    Asthma medications must be continued and asthma must be well controlled prior to receiving CLUSTER immunotherapy. Immunotherapy should not be given if you are feeling ill.    Other allergy medications may be continued    You should plan to spend approximately 2 hours at the clinic. Please feel free to bring things to occupy your time such as books, work, or computers. You may also wish to bring something to eat as you will not be able to leave the clinic once the procedure has begun.    You will be required to bring an epinephrine auto-injector with you to your CLUSTER immunotherapy appointments and all subsequent allergy shot appointments    You will be required to take the following pre-medication regimen prior  to your CLUSTER immunotherapy appointments  o Medications to be taken 1 day prior to CLUSTER:  - Zyrtec 10mg or Allegra 180mg twice daily  o Medications to be taken the morning of CLUSTER:  - Zyrtec 10mg or Allegra 180mg                Follow-ups after your visit        Your next 10 appointments already scheduled     Dec 26, 2017 10:45 AM CST   Nurse Only with ALLERGY [facility])    [address] [address]   [phone]           Every allergy patient MUST wait 30 minutes after their allergy shot. No exceptions.  Xolair shots #1-3 should plan to wait 2 hours in clinic Xolair shots after #4 should plan 30 minute wait in clinic            Jan 03, 2018 10:00 AM CST   Nurse Only with ALLERGY [facility])    [address] [address]  "  [phone]           Every allergy patient MUST wait 30 minutes after their allergy shot. No exceptions.  Xolair shots #1-3 should plan to wait 2 hours in clinic Xolair shots after #4 should plan 30 minute wait in clinic            2018 10:30 AM CST   Ortho Eval with Christina Donald, PT   [facility] ([facility])    [address]  [address]  [facility] [address] [phone]              Who to contact     If you have questions or need follow up information about today's clinic visit or your schedule please contact [facility] directly at [phone].  Normal or non-critical lab and imaging results will be communicated to you by Roombeatshart, letter or phone within 4 business days after the clinic has received the results. If you do not hear from us within 7 days, please contact the clinic through Roombeatshart or phone. If you have a critical or abnormal lab result, we will notify you by phone as soon as possible.  Submit refill requests through ePrep or call your pharmacy and they will forward the refill request to us. Please allow 3 business days for your refill to be completed.          Additional Information About Your Visit        Roombeatshart Information     ePrep lets you send messages to your doctor, view your test results, renew your prescriptions, schedule appointments and more. To sign up, go to [email or web address] . Click on \"Log in\" on the left side of the screen, which will take you to the Welcome page. Then click on \"Sign up Now\" on the right side of the page.     You will be asked to enter the access code listed below, as well as some personal information. Please follow the directions to create your username and password.     Your access code is: [email or web address]  Expires: 2018 10:21 AM     Your access code will  in 90 days. If you need help or a new code, please call your [facility] clinic or [phone].        Care EveryWhere ID  " "   This is your Care EveryWhere ID. This could be used by other organizations to access your [facility] medical records  [id number]        Your Vitals Were     Pulse Temperature Respirations Height Pulse Oximetry BMI (Body Mass Index)    116 97.5  F (36.4  C) (Oral) 20 1.651 m (5' 5\") 96% 33.12 kg/m2       Blood Pressure from Last 3 Encounters:   12/22/17 129/89   11/21/17 112/81   09/15/17 116/71    Weight from Last 3 Encounters:   12/22/17 90.3 kg (199 lb)   11/21/17 88.4 kg (194 lb 12.8 oz)   09/15/17 86.3 kg (190 lb 3.2 oz)              Today, you had the following     No orders found for display         Today's Medication Changes          These changes are accurate as of: 12/22/17  4:15 PM.  If you have any questions, ask your nurse or doctor.               Start taking these medicines.        Dose/Directions    albuterol 108 (90 BASE) MCG/ACT Inhaler   Commonly known as:  PROAIR HFA/PROVENTIL HFA/VENTOLIN HFA   Used for:  SOB (shortness of breath)   Started by:  Magdy Blevins DO        Dose:  2 puff   Inhale 2 puffs into the lungs every 4 hours as needed   Quantity:  1 Inhaler   Refills:  3            Where to get your medicines      These medications were sent to [place], [place] - [address]  [address] [address]     Phone:  [phone]     albuterol 108 (90 BASE) MCG/ACT Inhaler    fluticasone 50 MCG/ACT spray                Primary Care Provider Office Phone # Fax #    [facility] [phone] [address] [address]        Equal Access to Services     ARMOND MCKEON AH: Kamryn Ackerman, pancho rondon, salyl kaalpauline solano, lincoln fernandez. Shanice [facility] [phone].    ATENCIÓN: Si habla español, tiene a titus disposición servicios gratuitos de asistencia lingüística. Llame al [phone].    We comply with applicable federal civil rights laws and [place] laws. " We do not discriminate on the basis of race, color, national origin, age, disability, sex, sexual orientation, or gender identity.            Thank you!     Thank you for choosing [facility]  for your care. Our goal is always to provide you with excellent care. Hearing back from our patients is one way we can continue to improve our services. Please take a few minutes to complete the written survey that you may receive in the mail after your visit with us. Thank you!             Your Updated Medication List - Protect others around you: Learn how to safely use, store and throw away your medicines at www.disposemymeds.org.          This list is accurate as of: 12/22/17  4:15 PM.  Always use your most recent med list.                   Brand Name Dispense Instructions for use Diagnosis    albuterol 108 (90 BASE) MCG/ACT Inhaler    PROAIR HFA/PROVENTIL HFA/VENTOLIN HFA    1 Inhaler    Inhale 2 puffs into the lungs every 4 hours as needed    SOB (shortness of breath)       * ALLERGEN IMMUNOTHERAPY PRESCRIPTION     5 mL    Cat Hair, Standardized 10,000 BAU/mL, ALK  3.0 ml Dog Hair Dander, A. P.  1:100 w/v, HS  1.0 ml Dust Mites F 30,000AU/mL, HS  0.5 ml Dust Mites P. 30,000 AU/mL, HS  0.5 ml  Diluent: HSA qs to 5ml    Allergic rhinitis due to animal dander, Allergic rhinitis due to dust mite       * ALLERGEN IMMUNOTHERAPY PRESCRIPTION     5 mL    Alternaria Tenuis 1:10 w/v, HS  0.5 ml Epicoccum Nigrum 1:10 w/v, HS 0.5 ml Hormodendrum Cladosporioides 1:10 w/v, HS 0.5 ml Diluent: HSA qs to 5ml    Allergic rhinitis due to mold       * ALLERGEN IMMUNOTHERAPY PRESCRIPTION     5 mL    Estuardo, White  1:20 w/v, HS  0.5 ml Birch Mix PRW 1:20 w/v, HS  0.5 ml Elm, American 1:20 w/v, HS  0.5 ml Hackberry 1:20 w/v, HS 0.5 ml Hickory, Shagbark 1:20 w/v, HS  0.5 ml [place] Mix 1:20 w/v, HS 0.5 ml Oak Mix RVW 1:20 w/v, HS 0.5 ml [place] Tree, Black 1:20 w/v, HS 0.5 ml [place], Black 1:20 w/v, HS 0.5 ml Diluent: HSA qs to 5ml     Chronic seasonal allergic rhinitis due to pollen       * ALLERGEN IMMUNOTHERAPY PRESCRIPTION     5 mL    Kochia 1:20 w/v, HS 1.0 ml Nettle 1:20 w/v, HS 1.0 ml Plantain, English 1:20 w/v, HS 1.0 ml Ragweed Mixed 1:20 w/v ALK  0.6 ml Russian Thistle 1:20 w/v, HS 1.0 ml Diluent: HSA qs to 5ml    Chronic seasonal allergic rhinitis due to pollen       BENADRYL PO      Take 50 mg by mouth nightly as needed for allergies or sleep        cetirizine 10 MG tablet    zyrTEC    30 tablet    Take 1 tablet (10 mg) by mouth every evening    Chronic seasonal allergic rhinitis due to pollen       cyclobenzaprine 10 MG tablet    FLEXERIL    30 tablet    Take 0.5-1 tablets (5-10 mg) by mouth At Bedtime    Sacral back pain       EPINEPHrine 0.3 MG/0.3ML injection 2-pack    EPIPEN 2-ODETTE    0.6 mL    Inject 0.3 mLs (0.3 mg) into the muscle once as needed for anaphylaxis    Need for desensitization to allergens       fluticasone 50 MCG/ACT spray    FLONASE    16 g    Spray 1-2 sprays into both nostrils daily    Allergic rhinitis due to animal dander, Allergic rhinitis due to dust mite, Allergic rhinitis due to mold, Chronic seasonal allergic rhinitis due to pollen       levonorgestrel 20 MCG/24HR IUD    MIRENA     1 each (20 mcg) by Intrauterine route continuous    Encounter for insertion of mirena IUD       permethrin 1 % Liqd     60 mL    Apply to clean, towel-dried hair, saturate hair and scalp, wash off after 10 min.    Lice       ziprasidone 40 MG capsule    GEODON     Take 20 mg by mouth At Bedtime        * Notice:  This list has 4 medication(s) that are the same as other medications prescribed for you. Read the directions carefully, and ask your doctor or other care provider to review them with you.

## 2017-12-22 NOTE — ASSESSMENT & PLAN NOTE
Shortness of breath around pet dander.  Likely allergic asthma.  I am going to prescribe her an albuterol inhaler that she can use to ascertain if beneficial or not.    Albuterol 2-4 puffs inhaled (use a spacer unless using a Proair Respiclick device) every 4 hours as needed for chest tightness, wheezing, shortness of breath and/or coughing.

## 2017-12-22 NOTE — PROGRESS NOTES
Maddy Ortiz is a 32 year old  female with previous medical history significant for allergic rhinitis who returns for a follow up visit. Maddy Ortiz is being seen today for asthma and seasonal allergies.     Patient returns for follow-up.  She was started on allergen immunotherapy for cat, dog, dust, mold, trees and weeds.  She is tolerating allergen immunotherapy.  However, she has difficulty coming in to get her weekly allergy shots.  She is wanting to switch to cluster immunotherapy.  Her last allergy shot was 12/1/2017.  Prior to that it was in October 2017.  She will have sneezing and congestion around pets.  She works as an .  She will of increased symptoms in the spring and the fall.  She is not using Flonase.  She will use oral antihistamine on a as needed basis.  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  She denies chest tightness, coughing or wheezing.  She does not have an albuterol inhaler.  No other triggers of symptoms.    Past Medical History:   Diagnosis Date     Bipolar 1 disorder (H) 12/6/2012     Paranoid type delusional disorder (H) 11/14/2012     Family History   Problem Relation Age of Onset     Adopted: Yes     Unknown/Adopted Mother      Unknown/Adopted Father      Unknown/Adopted Other      Past Surgical History:   Procedure Laterality Date     TONSILLECTOMY & ADENOIDECTOMY      as a child       REVIEW OF SYSTEMS:  General: positive  for weight gain. negative for weight loss. negative for changes in sleep.   Ears: negative for fullness. negative for hearing loss. negative for dizziness.   Nose: negative for snoring.negative for changes in smell. negative for drainage.   Throat: negative for hoarseness. negative for sore throat. negative for trouble swallowing.   Lungs: negative for shortness of breath.negative for wheezing. negative for sputum production.   Cardiovascular: negative for chest pain.  negative for swelling of ankles. negative for fast or irregular heartbeat.   Gastrointestinal: negative for nausea. negative for heartburn. negative for acid reflux.   Musculoskeletal: negative for joint pain. negative for joint stiffness. negative for joint swelling.   Neurologic: negative for seizures. negative for fainting. negative for weakness.   Psychiatric: negative for changes in mood. negative for anxiety.   Endocrine: negative for cold intolerance. negative for heat intolerance. negative for tremors.   Hematologic: negative for easy bruising. negative for easy bleeding.  Integumentary: negative for rash. negative for scaling. negative for nail changes.       Current Outpatient Prescriptions:      albuterol (PROAIR HFA/PROVENTIL HFA/VENTOLIN HFA) 108 (90 BASE) MCG/ACT Inhaler, Inhale 2 puffs into the lungs every 4 hours as needed, Disp: 1 Inhaler, Rfl: 3     fluticasone (FLONASE) 50 MCG/ACT spray, Spray 1-2 sprays into both nostrils daily, Disp: 16 g, Rfl: 11     levonorgestrel (MIRENA) 20 MCG/24HR IUD, 1 each (20 mcg) by Intrauterine route continuous, Disp: , Rfl:      DiphenhydrAMINE HCl (BENADRYL PO), Take 50 mg by mouth nightly as needed for allergies or sleep, Disp: , Rfl:      EPINEPHrine (EPIPEN 2-ODETTE) 0.3 MG/0.3ML injection, Inject 0.3 mLs (0.3 mg) into the muscle once as needed for anaphylaxis, Disp: 0.6 mL, Rfl: 3     ziprasidone (GEODON) 40 MG capsule, Take 20 mg by mouth At Bedtime , Disp: , Rfl: 5     ORDER FOR ALLERGEN IMMUNOTHERAPY, Cat Hair, Standardized 10,000 BAU/mL, ALK  3.0 ml Dog Hair Dander, A. P.  1:100 w/v, HS  1.0 ml Dust Mites F 30,000AU/mL, HS  0.5 ml Dust Mites P. 30,000 AU/mL, HS  0.5 ml  Diluent: HSA qs to 5ml (Patient not taking: Reported on 12/22/2017), Disp: 5 mL, Rfl: PRN     ORDER FOR ALLERGEN IMMUNOTHERAPY, Alternaria Tenuis 1:10 w/v, HS  0.5 ml Epicoccum Nigrum 1:10 w/v, HS 0.5 ml Hormodendrum Cladosporioides 1:10 w/v, HS 0.5 ml Diluent: HSA qs to 5ml (Patient not  taking: Reported on 12/22/2017), Disp: 5 mL, Rfl: PRN     ORDER FOR ALLERGEN IMMUNOTHERAPY, Estuardo, White  1:20 w/v, HS  0.5 ml Birch Mix PRW 1:20 w/v, HS  0.5 ml Elm, American 1:20 w/v, HS  0.5 ml Hackberry 1:20 w/v, HS 0.5 ml Hickory, Shagbark 1:20 w/v, HS  0.5 ml [place] Mix 1:20 w/v, HS 0.5 ml Oak Mix RVW 1:20 w/v, HS 0.5 ml [place] Tree, Black 1:20 w/v, HS 0.5 ml De Lancey, Black 1:20 w/v, HS 0.5 ml Diluent: HSA qs to 5ml (Patient not taking: Reported on 12/22/2017), Disp: 5 mL, Rfl: PRN     ORDER FOR ALLERGEN IMMUNOTHERAPY, Kochia 1:20 w/v, HS 1.0 ml Nettle 1:20 w/v, HS 1.0 ml Plantain, English 1:20 w/v, HS 1.0 ml Ragweed Mixed 1:20 w/v ALK  0.6 ml Russian Thistle 1:20 w/v, HS 1.0 ml Diluent: HSA qs to 5ml (Patient not taking: Reported on 12/22/2017), Disp: 5 mL, Rfl: PRN     cyclobenzaprine (FLEXERIL) 10 MG tablet, Take 0.5-1 tablets (5-10 mg) by mouth At Bedtime (Patient not taking: Reported on 8/1/2017), Disp: 30 tablet, Rfl: 0     permethrin 1 % LIQD, Apply to clean, towel-dried hair, saturate hair and scalp, wash off after 10 min. (Patient not taking: Reported on 9/15/2017), Disp: 60 mL, Rfl: 1     cetirizine (ZYRTEC) 10 MG tablet, Take 1 tablet (10 mg) by mouth every evening (Patient not taking: Reported on 8/1/2017), Disp: 30 tablet, Rfl: 11  Immunization History   Administered Date(s) Administered     Influenza (IIV3) PF 12/06/2012     Influenza Vaccine IM 3yrs+ 4 Valent IIV4 11/21/2017     TDAP Vaccine (Adacel) 12/06/2012     Allergies   Allergen Reactions     Animal Dander      Nkda [No Known Drug Allergies]      Seasonal Allergies      Weeds and mold         EXAM:   Constitutional:  Appears well-developed and well-nourished. No distress.   HEENT:   Head: Normocephalic.   Mouth/Throat: No oropharyngeal exudate present.   Boggy nasal tissue and pale.    Eyes: Conjunctivae are non-erythematous   Cardiovascular: Normal rate, regular rhythm and normal heart sounds. Exam reveals no gallop and no friction rub.    No murmur heard.  Respiratory: Effort normal and breath sounds normal. No respiratory distress. No wheezes. No rales.   Musculoskeletal: Normal range of motion.    Neuro: Oriented to person, place, and time.  Skin: Skin is warm and dry. No rash noted.   Psychiatric: Normal mood and affect.     Nursing note and vitals reviewed.    ASSESSMENT/PLAN:  Problem List Items Addressed This Visit        Respiratory    Seasonal allergic rhinitis due to pollen     History of nasal and ocular symptoms for multiple years. Perennial with spring and fall worsening. Tried cetirizine and loratadine which were helpful. Currently used Allegra as needed. Allergy testing done in 2013 positive for dust mite, cat, dog, molds, trees, grasses and weeds.       Skin testing  Positive for cat, dog, dust mites, weeds, trees and molds.       - Flonase 2 spray/nostril daily. Use spring and fall at the very least.   - Allegra or Zyrtec as needed and on allergy shot days.    - Continue allergen immunotherapy. Starting cluster immunotherapy. Discussed protocol in depth. Discussed premedication regime with patient.          Relevant Medications    albuterol (PROAIR HFA/PROVENTIL HFA/VENTOLIN HFA) 108 (90 BASE) MCG/ACT Inhaler    fluticasone (FLONASE) 50 MCG/ACT spray    Allergic rhinitis due to mold    Relevant Medications    albuterol (PROAIR HFA/PROVENTIL HFA/VENTOLIN HFA) 108 (90 BASE) MCG/ACT Inhaler    fluticasone (FLONASE) 50 MCG/ACT spray    Allergic rhinitis due to animal dander    Relevant Medications    albuterol (PROAIR HFA/PROVENTIL HFA/VENTOLIN HFA) 108 (90 BASE) MCG/ACT Inhaler    fluticasone (FLONASE) 50 MCG/ACT spray    Allergic rhinitis due to dust mite    Relevant Medications    albuterol (PROAIR HFA/PROVENTIL HFA/VENTOLIN HFA) 108 (90 BASE) MCG/ACT Inhaler    fluticasone (FLONASE) 50 MCG/ACT spray    SOB (shortness of breath) - Primary     Shortness of breath around pet dander.  Likely allergic asthma.  I am going to prescribe her  an albuterol inhaler that she can use to ascertain if beneficial or not.    Albuterol 2-4 puffs inhaled (use a spacer unless using a Proair Respiclick device) every 4 hours as needed for chest tightness, wheezing, shortness of breath and/or coughing.            Relevant Medications    albuterol (PROAIR HFA/PROVENTIL HFA/VENTOLIN HFA) 108 (90 BASE) MCG/ACT Inhaler    fluticasone (FLONASE) 50 MCG/ACT spray          Chart documentation with Dragon Voice recognition Software. Although reviewed after completion, some words and grammatical errors may remain.    Magdy Blevins,    Allergy/Immunology  [facility], [place] and [place]

## 2017-12-22 NOTE — TELEPHONE ENCOUNTER
December 22, 2017      Vials received below:  Allergy serums received at [place].     Vials received below:    Vial Color  Content                      Vial Size Expiration Date  Blue 1:100, yellow 1:10, red 1:1 Cat, Dog, Dust Mite 5mL  12/20/18  Blue 1:100, yellow 1:10, red 1:1 Trees   5mL  12/20/18  Blue 1:100, yellow 1:10, red 1:1 Molds   5mL  12/20/18  Blue 1:100, yellow 1:10, red 1:1 Weeds   5mL  12/20/18      Saad Grant    Called and spoke with patient.  Notified that her serums have arrived.  She has an appointment scheduled for 12/26/17.    Saad Grant

## 2017-12-22 NOTE — PROGRESS NOTES
Allergy serums billed at [place].     Vials received below:    Vial Color  Content                      Vial Size Expiration Date  Blue 1:100, yellow 1:10, red 1:1 Cat, Dog, Dust Mite 5mL  12/20/18  Blue 1:100, yellow 1:10, red 1:1 Trees   5mL  12/20/18  Blue 1:100, yellow 1:10, red 1:1 Molds   5mL  12/20/18  Blue 1:100, yellow 1:10, red 1:1 Weeds   5mL  12/20/18        Original Refill encounter date: 12/1/17      Signature  Shirley Grant

## 2017-12-22 NOTE — PATIENT INSTRUCTIONS
Allergy Staff Appt Hours Shot Hours Locations    Physician     Magdy Blevins DO       Support Staff     Nessa SIERRA RN      Shirley SILVA MA  Monday:                      [place] 8-7 Tuesday:         [place] 8-5 Wednesday:        [place]: 7-5     Friday:        [place] 7-5   [place]        Monday: 9-6        Friday: 7-2     [place]        Tuesday: 7-10:45        Thursday: 1:30-6:30     [place] Tuesday: 1-7        Wednesday: 11-6 Thursday: 7-12 [facility]  [address] [place] [address]  Appt Line: [phone]  Allergy RN (Monday):  [phone]    [facility]  [address] [place] [address]  Appt Line: [phone]  Allergy RN (Tues & Wed):  [phone]    [facility]  [address] [place] [address]  Appt Line: [phone]  Allergy RN (Friday):  [phone]       Important Scheduling Information  Aspirin Desensitization: Appt will last 2 clinic days. Please call the Allergy RN line for your clinic to schedule. Discontinue antihistamines 7 days prior to the appointment.     Food Challenges: Appt will last 3-4 hours. Please call the Allergy RN line for your clinic to schedule. Discontinue antihistamines 7 days prior to the appointment.     Penicillin Testing: Appt will last 2-3 hours. Please call the Allergy RN line for your clinic to schedule. Discontinue antihistamines 7 days prior to the appointment.     Skin Testing: Appt will about 40 minutes. Call the appointment line for your clinic to schedule. Discontinue antihistamines 7 days prior to the appointment.     Venom Testing: Appt will last 2-3 hours. Please call the Allergy RN line for your clinic to schedule. Discontinue antihistamines 7 days prior to the appointment.     Thank you for trusting us with your Allergy, Asthma, and Immunology care. Please feel free to contact us with any questions or concerns you may have.      - Albuterol 2-4 puffs inhaled (use a spacer unless using a  Proair Respiclick device) every 4 hours as needed for chest tightness, wheezing, shortness of breath and/or coughing.   - Zyrtec as needed.   - Flonase 2 sprays/nostril daily in spring and fall.   - Start Cluster allergy shots.     ACCELERATED IMMUNOTHERAPY PATIENT INFORMATION    Immunotherapy is a treatment that alters the patient s immune system so they have less allergy symptoms, use less medications to control symptoms, improved quality of life, and less health care utilization    Accelerated immunotherapy schedules are designed to allow patients to reach their maintenance immunotherapy dose in a shorter time frame than conventional immunotherapy.    Clinical benefit can be reached more rapidly using accelerated immunotherapy.     A lot of patients do not want to start allergen immunotherapy secondary to the upfront time commitment associated with conventional allergy shots. Rush immunotherapy would allow a patient to be on monthly allergy shots within 6-10 weeks. Cluster immunotherapy would allow the patient to be on monthly injections around 8-9 weeks.     Rush immunotherapy has historically been associated with an increased risk of reactions, however the risk is substantially lowered by only advancing on RUSH immunotherapy day to the mid-yellow vial, using a pre-medication regimen consisting of steroids and antihistamines, and making sure asthma is well controlled the RUSH immunotherapy day. This puts the risk of a systemic reaction similar to conventional immunotherapy. Cluster immunotherapy is similar in the risk of systemic reaction to conventional immunotherapy. The patient would still need to take oral antihistamine on cluster immunotherapy days.    CLUSTER IMMUNOTHERAPY PATIENT INSTRUCTIONS    Asthma medications must be continued and asthma must be well controlled prior to receiving CLUSTER immunotherapy. Immunotherapy should not be given if you are feeling ill.    Other allergy medications may be  continued    You should plan to spend approximately 2 hours at the clinic. Please feel free to bring things to occupy your time such as books, work, or computers. You may also wish to bring something to eat as you will not be able to leave the clinic once the procedure has begun.    You will be required to bring an epinephrine auto-injector with you to your CLUSTER immunotherapy appointments and all subsequent allergy shot appointments    You will be required to take the following pre-medication regimen prior  to your CLUSTER immunotherapy appointments  o Medications to be taken 1 day prior to CLUSTER:  - Zyrtec 10mg or Allegra 180mg twice daily  o Medications to be taken the morning of CLUSTER:  - Zyrtec 10mg or Allegra 180mg

## 2017-12-22 NOTE — ASSESSMENT & PLAN NOTE
History of nasal and ocular symptoms for multiple years. Perennial with spring and fall worsening. Tried cetirizine and loratadine which were helpful. Currently used Allegra as needed. Allergy testing done in 2013 positive for dust mite, cat, dog, molds, trees, grasses and weeds.       Skin testing  Positive for cat, dog, dust mites, weeds, trees and molds.       - Flonase 2 spray/nostril daily. Use spring and fall at the very least.   - Allegra or Zyrtec as needed and on allergy shot days.    - Continue allergen immunotherapy. Starting cluster immunotherapy. Discussed protocol in depth. Discussed premedication regime with patient.

## 2017-12-27 ENCOUNTER — TELEPHONE (OUTPATIENT)
Dept: ALLERGY | Facility: CLINIC | Age: 32
End: 2017-12-27

## 2018-01-03 ENCOUNTER — TELEPHONE (OUTPATIENT)
Dept: ALLERGY | Facility: CLINIC | Age: 33
End: 2018-01-03

## 2018-01-03 NOTE — TELEPHONE ENCOUNTER
Patient is receiving cluster therapy in [place] Feb 6th 2018.  Scheduled  for [facility] deliver on 1/4/2018.    Bradford Roe RN

## 2018-01-04 NOTE — TELEPHONE ENCOUNTER
[facility] picked up patients serum at 9am 1/4/18 to deliver to [place].    Simona Delacruz RN

## 2018-02-05 ENCOUNTER — TELEPHONE (OUTPATIENT)
Dept: ALLERGY | Facility: CLINIC | Age: 33
End: 2018-02-05

## 2018-02-05 NOTE — TELEPHONE ENCOUNTER
"Called patient to reminder her of her appointment scheduled on 2/6/18 with Dr. Blevins for her first cluster immunotherapy. Patient states that she will need to cancel all of her cluster appointments as she has a class that is \"far away\" and she will not be able to make her appointments. She states that she would not like to start allergy shots at this time. Patient's appointments have been canceled per her request.To DENTON Cort[facility].    Neeru Carrasco [facility]  "

## 2018-03-06 ENCOUNTER — OFFICE VISIT (OUTPATIENT)
Dept: ALLERGY | Facility: OTHER | Age: 33
End: 2018-03-06
Payer: COMMERCIAL

## 2018-03-06 ENCOUNTER — TELEPHONE (OUTPATIENT)
Dept: FAMILY MEDICINE | Facility: OTHER | Age: 33
End: 2018-03-06

## 2018-03-06 VITALS
WEIGHT: 187.5 LBS | OXYGEN SATURATION: 96 % | SYSTOLIC BLOOD PRESSURE: 132 MMHG | HEIGHT: 65 IN | DIASTOLIC BLOOD PRESSURE: 94 MMHG | RESPIRATION RATE: 16 BRPM | BODY MASS INDEX: 31.24 KG/M2 | HEART RATE: 104 BPM

## 2018-03-06 DIAGNOSIS — J30.1 CHRONIC SEASONAL ALLERGIC RHINITIS DUE TO POLLEN: ICD-10-CM

## 2018-03-06 DIAGNOSIS — J30.89 ALLERGIC RHINITIS DUE TO MOLD: ICD-10-CM

## 2018-03-06 DIAGNOSIS — J30.81 ALLERGIC RHINITIS DUE TO ANIMAL DANDER: ICD-10-CM

## 2018-03-06 DIAGNOSIS — Z53.9 ERRONEOUS ENCOUNTER--DISREGARD: Primary | ICD-10-CM

## 2018-03-06 DIAGNOSIS — J30.89 ALLERGIC RHINITIS DUE TO DUST MITE: ICD-10-CM

## 2018-03-06 NOTE — TELEPHONE ENCOUNTER
ALLERGY SOLUTION RE-ORDER REQUEST    Maddy Ortiz 1985 MRN: [id number]    DATE NEEDED:  3/12/18  Vial Color Content  Top Dose   Last Dose  Vial Size  Green 1:1,000 Molds  Red 1:1 0.5  Yellow 1:100   0.25       5ml  Green 1:1,000 Weeds  Red 1:1 0.5  Yellow 1:100    0.25  5ml  Green 1:1,000 Trees  Red 1:1 0.5  Yellow 1:100  0.25  5ml  Green 1:1,000 Cat, Dog, Dust Mite Red 1:1 0.5      Yellow 1:100   0.25  5ml      Shot Clinic Location:  [email or web address]  Ship to Location: Mobile  Special Instructions:  Please mix down blue vials to green.  Sent by  (rush) on 3/6/18 at 1:58pm.  Tracking number: [id number].    Updated Prescription Needed: No      Requester Signature  Nessa Sanchez

## 2018-03-06 NOTE — LETTER
3/6/2018         RE: Maddy Ortiz  [address]   [facility] [address]        Dear Colleague,    Thank you for referring your patient, Maddy rOtiz, to the [facility]. Please see a copy of my visit note below.    erroneous encounter.       Again, thank you for allowing me to participate in the care of your patient.        Sincerely,        Magdy Blevins, DO

## 2018-03-06 NOTE — MR AVS SNAPSHOT
After Visit Summary   3/6/2018    Maddy Ortiz    MRN: [id number]           Patient Information     Date Of Birth          1985        Visit Information        Provider Department      3/6/2018 1:00 PM Magdy Blevins DO [facility]        Today's Diagnoses     ERRONEOUS ENCOUNTER--DISREGARD    -  1       Follow-ups after your visit        Your next 10 appointments already scheduled     Mar 13, 2018  1:00 PM CDT   [facility] Visit with Magdy Blevins DO   [facility]    [address]  [facility] [address]   [phone]            Mar 20, 2018  1:00 PM CDT   Return Visit with Magdy Blevins DO   [facility]    [address]  [place] [address]   [phone]            Mar 20, 2018  4:00 PM CDT   SHORT with Paul [facility]ae MD   [facility]    [address] [address]   [phone]            Mar 27, 2018  1:20 PM CDT   [facility] Visit with Magdy Blevins DO   [facility]    [address]  [place] [address]   [phone]            Apr 05, 2018  1:30 PM CDT   Nurse Only with ER ALLERGY SHOTS   [facility]    [address]  [place] [address]   [phone]            Apr 10, 2018  1:00 PM CDT   Return Visit with Magdy Blevins DO   [facility]    [address]  [place] [address]   [phone]            Apr 17, 2018  1:00 PM CDT   Return Visit with Magdy Blevins DO   [facility]    [address]  [place] [address]   [phone]            Apr 24, 2018  1:00 PM CDT   Return Visit with Magdy Blevins DO  "  [facility]    [address]  [facility] [address] [phone]              Who to contact     If you have questions or need follow up information about today's clinic visit or your schedule please contact [facility] directly at [phone].  Normal or non-critical lab and imaging results will be communicated to you by MyChart, letter or phone within 4 business days after the clinic has received the results. If you do not hear from us within 7 days, please contact the clinic through MyChart or phone. If you have a critical or abnormal lab result, we will notify you by phone as soon as possible.  Submit refill requests through [employer] or call your pharmacy and they will forward the refill request to us. Please allow 3 business days for your refill to be completed.          Additional Information About Your Visit        [employer] Information     [employer] lets you send messages to your doctor, view your test results, renew your prescriptions, schedule appointments and more. To sign up, go to [email or web address] . Click on \"Log in\" on the left side of the screen, which will take you to the Welcome page. Then click on \"Sign up Now\" on the right side of the page.     You will be asked to enter the access code listed below, as well as some personal information. Please follow the directions to create your username and password.     Your access code is: [email or web address]  Expires: 2018  2:10 PM     Your access code will  in 90 days. If you need help or a new code, please call your [facility] or [phone].        Care EveryWhere ID     This is your Care EveryWhere ID. This could be used by other organizations to access your [place] medical records  [id number]        Your Vitals Were     Pulse Respirations Height Pulse Oximetry BMI (Body Mass Index)       104 16 1.64 m (5' 4.57\") 96% 31.62 kg/m2        Blood Pressure from Last " 3 Encounters:   03/06/18 (!) 132/94   12/22/17 129/89   11/21/17 112/81    Weight from Last 3 Encounters:   03/06/18 85 kg (187 lb 8 oz)   12/22/17 90.3 kg (199 lb)   11/21/17 88.4 kg (194 lb 12.8 oz)              Today, you had the following     No orders found for display       Primary Care Provider Office Phone # Fax #    [facility] [phone] [address] [address]        Equal Access to Services     BRIGID MCKEON : Hadii linda caldrea Soradha, waaxda luqadaha, qaybta kaalmada russ, lincoln maciel . So [facility] [phone].    ATENCIÓN: Si habla español, tiene a titus disposición servicios gratuitos de asistencia lingüística. [facility] [phone].    We comply with applicable federal civil rights laws and [place] laws. We do not discriminate on the basis of race, color, national origin, age, disability, sex, sexual orientation, or gender identity.            Thank you!     Thank you for choosing [facility]  for your care. Our goal is always to provide you with excellent care. Hearing back from our patients is one way we can continue to improve our services. Please take a few minutes to complete the written survey that you may receive in the mail after your visit with us. Thank you!             Your Updated Medication List - Protect others around you: Learn how to safely use, store and throw away your medicines at www.disposemymeds.org.          This list is accurate as of 3/6/18  2:10 PM.  Always use your most recent med list.                   Brand Name Dispense Instructions for use Diagnosis    albuterol 108 (90 BASE) MCG/ACT Inhaler    PROAIR HFA/PROVENTIL HFA/VENTOLIN HFA    1 Inhaler    Inhale 2 puffs into the lungs every 4 hours as needed    SOB (shortness of breath)       * ALLERGEN IMMUNOTHERAPY PRESCRIPTION     5 mL    Cat Hair, Standardized 10,000 BAU/mL, ALK  3.0 ml Dog Hair Dander, A. P.  1:100 w/v, HS   1.0 ml Dust Mites F 30,000AU/mL, HS  0.5 ml Dust Mites P. 30,000 AU/mL, HS  0.5 ml  Diluent: HSA qs to 5ml    Allergic rhinitis due to animal dander, Allergic rhinitis due to dust mite       * ALLERGEN IMMUNOTHERAPY PRESCRIPTION     5 mL    Alternaria Tenuis 1:10 w/v, HS  0.5 ml Epicoccum Nigrum 1:10 w/v, HS 0.5 ml Hormodendrum Cladosporioides 1:10 w/v, HS 0.5 ml Diluent: HSA qs to 5ml    Allergic rhinitis due to mold       * ALLERGEN IMMUNOTHERAPY PRESCRIPTION     5 mL    Estuardo, White  1:20 w/v, HS  0.5 ml Birch Mix PRW 1:20 w/v, HS  0.5 ml Elm, American 1:20 w/v, HS  0.5 ml Hackberry 1:20 w/v, HS 0.5 ml Hickory, Shagbark 1:20 w/v, HS  0.5 ml [place] Mix 1:20 w/v, HS 0.5 ml Oak Mix RVW 1:20 w/v, HS 0.5 ml Turkey Tree, Black 1:20 w/v, HS 0.5 ml [place], Black 1:20 w/v, HS 0.5 ml Diluent: HSA qs to 5ml    Chronic seasonal allergic rhinitis due to pollen       * ALLERGEN IMMUNOTHERAPY PRESCRIPTION     5 mL    Kochia 1:20 w/v, HS 1.0 ml Nettle 1:20 w/v, HS 1.0 ml Plantain, English 1:20 w/v, HS 1.0 ml Ragweed Mixed 1:20 w/v ALK  0.6 ml Russian Thistle 1:20 w/v, HS 1.0 ml Diluent: HSA qs to 5ml    Chronic seasonal allergic rhinitis due to pollen       BENADRYL PO      Take 50 mg by mouth nightly as needed for allergies or sleep        cetirizine 10 MG tablet    zyrTEC    30 tablet    Take 1 tablet (10 mg) by mouth every evening    Chronic seasonal allergic rhinitis due to pollen       cyclobenzaprine 10 MG tablet    FLEXERIL    30 tablet    Take 0.5-1 tablets (5-10 mg) by mouth At Bedtime    Sacral back pain       EPINEPHrine 0.3 MG/0.3ML injection 2-pack    EPIPEN 2-ODETTE    0.6 mL    Inject 0.3 mLs (0.3 mg) into the muscle once as needed for anaphylaxis    Need for desensitization to allergens       fluticasone 50 MCG/ACT spray    FLONASE    16 g    Spray 1-2 sprays into both nostrils daily    Allergic rhinitis due to animal dander, Allergic rhinitis due to dust mite, Allergic rhinitis due to mold, Chronic seasonal  allergic rhinitis due to pollen       levonorgestrel 20 MCG/24HR IUD    MIRENA     1 each (20 mcg) by Intrauterine route continuous    Encounter for insertion of mirena IUD       permethrin 1 % Liqd     60 mL    Apply to clean, towel-dried hair, saturate hair and scalp, wash off after 10 min.    Lice       ziprasidone 40 MG capsule    GEODON     Take 20 mg by mouth At Bedtime        * Notice:  This list has 4 medication(s) that are the same as other medications prescribed for you. Read the directions carefully, and ask your doctor or other care provider to review them with you.

## 2018-03-06 NOTE — TELEPHONE ENCOUNTER
Patient walked into clinic asking for a .  She has a concern that someone left a ripped sheet of paper on her door at home that says to call them.  With phone number  [phone]. (Per patient: backside of paper looked like is says [facility]) Unable to decipher name. Patient does not know anyone by this number.  She is worried about trying to call this unknown number.  She did not bring actual paper but a photo of it.      Asked her if she called her local police and she states that she did not want to get them involved.    Reverse look up shows that this number is from [facility]: Yaima Sandy from Child foster services.  The signature does not look like it would be this name.      Patient doesn't want a return call via phone.  She likes to talk to be face to face.  But is worried why someone would come into her apartment building and leave this partial paper note.      During entire conversation patient wore her sunglasses.  RN advised her to not contact that number at this time and speak with someone at [facility] services.  If it was a professional person they would reach out again.     Patient is mainly here for Allergy visit.  Asked that RN did to not call her on this matter just wanted it written down.  She has an appointment next week and can follow up then if needed.    Will follow up with supervisor if there is anything else that we can do from clinic to help this patient.    Juan Aldridge, RN, BSN

## 2018-03-08 DIAGNOSIS — J30.2 SEASONAL ALLERGIC RHINITIS: Primary | ICD-10-CM

## 2018-03-08 PROCEDURE — 95165 ANTIGEN THERAPY SERVICES: CPT | Performed by: ALLERGY & IMMUNOLOGY

## 2018-03-08 NOTE — PROGRESS NOTES
Allergy serums billed at [facility].     Vials received below:    Vial Color                                         Content                      Vial Size                   Expiration Date  Green 1:1,000                                   Cat, Dog, Dust Mite              5 mL                                    9/7/2018  Green 1:1,000                                   Molds                                    5 mL                                    9/7/2018  Green 1:1,000                                   Trees                                     5 mL                                    9/7/2018  Green 1:1,000                                   Weeds                                   5 mL                                    9/7/2018    Original Refill encounter date: 3/6/2018      Signature  Priscilla Palacios

## 2018-03-08 NOTE — TELEPHONE ENCOUNTER
Allergy serums received at [facility].     Vials received below:    Vial Color Content                      Vial Size Expiration Date  Green 1:1,000 Cat, Dog, Dust Mite 5 mL  9/7/2018  Green 1:1,000 Molds 5 mL  9/7/2018  Green 1:1,000 Trees 5 mL  9/7/2018  Green 1:1,000 Weeds 5 mL  9/7/2018      Saad Palacios

## 2018-03-13 ENCOUNTER — OFFICE VISIT (OUTPATIENT)
Dept: ALLERGY | Facility: OTHER | Age: 33
End: 2018-03-13
Payer: COMMERCIAL

## 2018-03-13 VITALS
BODY MASS INDEX: 32.46 KG/M2 | HEART RATE: 90 BPM | DIASTOLIC BLOOD PRESSURE: 78 MMHG | TEMPERATURE: 97.6 F | WEIGHT: 192.5 LBS | SYSTOLIC BLOOD PRESSURE: 122 MMHG | OXYGEN SATURATION: 96 %

## 2018-03-13 DIAGNOSIS — J30.1 CHRONIC SEASONAL ALLERGIC RHINITIS DUE TO POLLEN: Primary | ICD-10-CM

## 2018-03-13 DIAGNOSIS — J30.89 ALLERGIC RHINITIS DUE TO MOLD: ICD-10-CM

## 2018-03-13 DIAGNOSIS — J30.89 ALLERGIC RHINITIS DUE TO DUST MITE: ICD-10-CM

## 2018-03-13 DIAGNOSIS — J30.81 ALLERGIC RHINITIS DUE TO ANIMAL DANDER: ICD-10-CM

## 2018-03-13 PROCEDURE — 99207 ZZC DROP WITH A PROCEDURE: CPT | Mod: 25 | Performed by: ALLERGY & IMMUNOLOGY

## 2018-03-13 PROCEDURE — 95180 RAPID DESENSITIZATION: CPT | Performed by: ALLERGY & IMMUNOLOGY

## 2018-03-13 NOTE — MR AVS SNAPSHOT
After Visit Summary   3/13/2018    Maddy Ortiz    MRN: [id number]           Patient Information     Date Of Birth          1985        Visit Information        Provider Department      3/13/2018 1:00 PM Magdy Blevins DO [facility]        Today's Diagnoses     Chronic seasonal allergic rhinitis due to pollen    -  1    Allergic rhinitis due to mold        Allergic rhinitis due to dust mite        Allergic rhinitis due to animal dander          Care Instructions    Allergy Staff Appt Hours Shot Hours Locations    Physician     Magdy Blevins DO       Support Staff     MARY ANNE Meneses MA  Monday:                      [place] 8-7     Tuesday:         [place] 8-5     Wednesday:        [place]: 7-5     Friday:        [place] 7-5   [place]        Monday: 9-5:50        Wednesday: 2-5:50        Friday: 7-12:50     [place]        Tuesday: 7-10:50        Thursday: 1:30-6:30     [place] Monday: 7:10-4:50        Tuesday: 12:30-6:30        Thursday: 7-11:50 [facility]  [address] [place] [address]  Appt Line: [phone]  Allergy RN (Monday):  [phone]    [facility]  [address] [place] [address]  Appt Line: [phone]  Allergy RN (Tues & Wed):  [phone]    [facility]  [address] [place] [address]  Appt Line: [phone]  Allergy RN (Friday):  [phone]       Important Scheduling Information  Aspirin Desensitization: Appt will last 2 clinic days. Please call the Allergy RN line for your clinic to schedule. Discontinue antihistamines 7 days prior to the appointment.     Food Challenges: Appt will last 3-4 hours. Please call the Allergy RN line for your clinic to schedule. Discontinue antihistamines 7 days prior to the appointment.     Penicillin Testing: Appt will last 2-3 hours. Please call the Allergy RN line for your clinic to schedule. Discontinue antihistamines 7 days prior to the  appointment.     Skin Testing: Appt will about 40 minutes. Call the appointment line for your clinic to schedule. Discontinue antihistamines 7 days prior to the appointment.     Venom Testing: Appt will last 2-3 hours. Please call the Allergy RN line for your clinic to schedule. Discontinue antihistamines 7 days prior to the appointment.     Thank you for trusting us with your Allergy, Asthma, and Immunology care. Please feel free to contact us with any questions or concerns you may have.                Follow-ups after your visit        Your next 10 appointments already scheduled     Mar 20, 2018  1:00 PM CDT   [facility]    [address]  [facility] [address]   [phone]            Mar 20, 2018  4:00 PM CDT   SHORT with Paul Bae MD   [facility]    [address] [facility] [address]   [phone]            Mar 27, 2018  1:20 PM CDT   [facility] Visit with Mag[facility]   [facility]    [address]  [facility] [address]   [phone]            Apr 05, 2018  1:30 PM CDT   Nurse Only with ER ALLERGY SHOTS   [facility]    [address]  [facility] [address]   [phone]            Apr 10, 2018  1:00 PM CDT   [facility] Visit with Magdy [facility]ins DO   [facility]    [address]  [place] [address]   [phone]            Apr 17, 2018  1:00 PM CDT   [facility] Visit with Magdy B[facility]ins DO   [facility]    [address]  [place] [address]   [phone]            Apr 24, 2018  1:00 PM CDT   Return Visit with Magdy Blevins DO   [facility]  "[place])    [address]  [facility] [address] [phone]              Who to contact     If you have questions or need follow up information about today's clinic visit or your schedule please contact [facility] directly at [phone].  Normal or non-critical lab and imaging results will be communicated to you by MyChart, letter or phone within 4 business days after the clinic has received the results. If you do not hear from us within 7 days, please contact the clinic through MyChart or phone. If you have a critical or abnormal lab result, we will notify you by phone as soon as possible.  Submit refill requests through [employer] or call your pharmacy and they will forward the refill request to us. Please allow 3 business days for your refill to be completed.          Additional Information About Your Visit        MyChart Information     [employer] lets you send messages to your doctor, view your test results, renew your prescriptions, schedule appointments and more. To sign up, go to [email or web address] . Click on \"Log in\" on the left side of the screen, which will take you to the Welcome page. Then click on \"Sign up Now\" on the right side of the page.     You will be asked to enter the access code listed below, as well as some personal information. Please follow the directions to create your username and password.     Your access code is: [email or web address]  Expires: 2018  3:10 PM     Your access code will  in 90 days. If you need help or a new code, please call your [place] clinic or [phone].        Care EveryWhere ID     This is your Care EveryWhere ID. This could be used by other organizations to access your [place] medical records  [id number]        Your Vitals Were     Pulse Temperature Pulse Oximetry BMI (Body Mass Index)          90 97.6  F (36.4  C) (Oral) 96% 32.46 kg/m2         Blood Pressure from Last 3 Encounters:   18 122/78   18 (!) " 132/94   12/22/17 129/89    Weight from Last 3 Encounters:   03/13/18 87.3 kg (192 lb 8 oz)   03/06/18 85 kg (187 lb 8 oz)   12/22/17 90.3 kg (199 lb)              We Performed the Following     RAPID DESENSITIZATION        Primary Care Provider Office Phone # Fax #    [facility] [phone] [address] [address]        Equal Access to Services     ARMOND MCKEON : Hadii aad ku hadasho Soomaali, waaxda luqadaha, qaybta kaalmada adeegyada, waxay idiin hayaan adeeg kharachele laloida fernandez. So [facility] [phone].    ATENCIÓN: Si habla español, tiene a titus disposición servicios gratuitos de asistencia lingüística. [facility] [phone].    We comply with applicable federal civil rights laws and [place] laws. We do not discriminate on the basis of race, color, national origin, age, disability, sex, sexual orientation, or gender identity.            Thank you!     Thank you for choosing [facility]  for your care. Our goal is always to provide you with excellent care. Hearing back from our patients is one way we can continue to improve our services. Please take a few minutes to complete the written survey that you may receive in the mail after your visit with us. Thank you!             Your Updated Medication List - Protect others around you: Learn how to safely use, store and throw away your medicines at www.disposemymeds.org.          This list is accurate as of 3/13/18  3:34 PM.  Always use your most recent med list.                   Brand Name Dispense Instructions for use Diagnosis    albuterol 108 (90 BASE) MCG/ACT Inhaler    PROAIR HFA/PROVENTIL HFA/VENTOLIN HFA    1 Inhaler    Inhale 2 puffs into the lungs every 4 hours as needed    SOB (shortness of breath)       * ALLERGEN IMMUNOTHERAPY PRESCRIPTION     5 mL    Cat Hair, Standardized 10,000 BAU/mL, ALK  3.0 ml Dog Hair Dander, A. P.  1:100 w/v, HS  1.0 ml Dust Mites F 30,000AU/mL, HS  0.5 ml Dust Mites  P. 30,000 AU/mL, HS  0.5 ml  Diluent: HSA qs to 5ml    Allergic rhinitis due to animal dander, Allergic rhinitis due to dust mite       * ALLERGEN IMMUNOTHERAPY PRESCRIPTION     5 mL    Alternaria Tenuis 1:10 w/v, HS  0.5 ml Epicoccum Nigrum 1:10 w/v, HS 0.5 ml Hormodendrum Cladosporioides 1:10 w/v, HS 0.5 ml Diluent: HSA qs to 5ml    Allergic rhinitis due to mold       * ALLERGEN IMMUNOTHERAPY PRESCRIPTION     5 mL    Estuardo, White  1:20 w/v, HS  0.5 ml Birch Mix PRW 1:20 w/v, HS  0.5 ml Elm, American 1:20 w/v, HS  0.5 ml Hackberry 1:20 w/v, HS 0.5 ml Hickory, Shagbark 1:20 w/v, HS  0.5 ml [place] Mix RW 1:20 w/v, HS 0.5 ml Oak Mix RVW 1:20 w/v, HS 0.5 ml [place] Tree, Black 1:20 w/v, HS 0.5 ml [place], Black 1:20 w/v, HS 0.5 ml Diluent: HSA qs to 5ml    Chronic seasonal allergic rhinitis due to pollen       * ALLERGEN IMMUNOTHERAPY PRESCRIPTION     5 mL    Kochia 1:20 w/v, HS 1.0 ml Nettle 1:20 w/v, HS 1.0 ml Plantain, English 1:20 w/v, HS 1.0 ml Ragweed Mixed 1:20 w/v ALK  0.6 ml Russian Thistle 1:20 w/v, HS 1.0 ml Diluent: HSA qs to 5ml    Chronic seasonal allergic rhinitis due to pollen       BENADRYL PO      Take 50 mg by mouth nightly as needed for allergies or sleep        cetirizine 10 MG tablet    zyrTEC    30 tablet    Take 1 tablet (10 mg) by mouth every evening    Chronic seasonal allergic rhinitis due to pollen       cyclobenzaprine 10 MG tablet    FLEXERIL    30 tablet    Take 0.5-1 tablets (5-10 mg) by mouth At Bedtime    Sacral back pain       EPINEPHrine 0.3 MG/0.3ML injection 2-pack    EPIPEN 2-ODETTE    0.6 mL    Inject 0.3 mLs (0.3 mg) into the muscle once as needed for anaphylaxis    Need for desensitization to allergens       fluticasone 50 MCG/ACT spray    FLONASE    16 g    Spray 1-2 sprays into both nostrils daily    Allergic rhinitis due to animal dander, Allergic rhinitis due to dust mite, Allergic rhinitis due to mold, Chronic seasonal allergic rhinitis due to pollen       levonorgestrel 20  MCG/24HR IUD    MIRENA     1 each (20 mcg) by Intrauterine route continuous    Encounter for insertion of mirena IUD       permethrin 1 % Liqd     60 mL    Apply to clean, towel-dried hair, saturate hair and scalp, wash off after 10 min.    Lice       ziprasidone 40 MG capsule    GEODON     Take 20 mg by mouth At Bedtime        * Notice:  This list has 4 medication(s) that are the same as other medications prescribed for you. Read the directions carefully, and ask your doctor or other care provider to review them with you.

## 2018-03-13 NOTE — PROGRESS NOTES
Maddy Ortiz is a 32 year old  female with previous medical history significant for allergic rhinitis who returns for a follow up visit. Maddy Ortiz is being seen today for asthma and seasonal allergies.     Patient presents today for cluster immunotherapy. The patient is currently in a good state of health. No recent fevers, chills, cough, wheezing, shortness of breath, skin rash, angioedema, nausea, vomiting or diarrhea. The risks and benefits were discussed and the patient/patient's family wishes to proceed. The consent was signed.    Past Medical History:   Diagnosis Date     Bipolar 1 disorder (H) 12/6/2012     Paranoid type delusional disorder (H) 11/14/2012     Family History   Problem Relation Age of Onset     Adopted: Yes     Unknown/Adopted Mother      Unknown/Adopted Father      Unknown/Adopted Other      Past Surgical History:   Procedure Laterality Date     TONSILLECTOMY & ADENOIDECTOMY      as a child       REVIEW OF SYSTEMS:  General: negative for weight gain. positive  for weight loss. negative for changes in sleep.   Ears: negative for fullness. negative for hearing loss. negative for dizziness.   Nose: negative for snoring.negative for changes in smell. negative for drainage.   Throat: negative for hoarseness. negative for sore throat. negative for trouble swallowing.   Lungs: negative for shortness of breath.negative for wheezing. negative for sputum production.   Cardiovascular: negative for chest pain. negative for swelling of ankles. negative for fast or irregular heartbeat.   Gastrointestinal: negative for nausea. negative for heartburn. negative for acid reflux.   Musculoskeletal: negative for joint pain. negative for joint stiffness. negative for joint swelling.   Neurologic: negative for seizures. negative for fainting. negative for weakness.   Psychiatric: negative for changes in mood. negative for anxiety.   Endocrine: negative for cold intolerance. negative for heat intolerance.  negative for tremors.   Hematologic: negative for easy bruising. negative for easy bleeding.  Integumentary: negative for rash. negative for scaling. negative for nail changes.       Current Outpatient Prescriptions:      ORDER FOR ALLERGEN IMMUNOTHERAPY, Cat Hair, Standardized 10,000 BAU/mL, ALK  3.0 ml Dog Hair Dander, A. P.  1:100 w/v, HS  1.0 ml Dust Mites F 30,000AU/mL, HS  0.5 ml Dust Mites P. 30,000 AU/mL, HS  0.5 ml  Diluent: HSA qs to 5ml, Disp: 5 mL, Rfl: PRN     ORDER FOR ALLERGEN IMMUNOTHERAPY, Alternaria Tenuis 1:10 w/v, HS  0.5 ml Epicoccum Nigrum 1:10 w/v, HS 0.5 ml Hormodendrum Cladosporioides 1:10 w/v, HS 0.5 ml Diluent: HSA qs to 5ml, Disp: 5 mL, Rfl: PRN     ORDER FOR ALLERGEN IMMUNOTHERAPY, Estuardo, White  1:20 w/v, HS  0.5 ml Birch Mix PRW 1:20 w/v, HS  0.5 ml Elm, American 1:20 w/v, HS  0.5 ml Hackberry 1:20 w/v, HS 0.5 ml Hickory, Shagbark 1:20 w/v, HS  0.5 ml [place] Mix RW 1:20 w/v, HS 0.5 ml Oak Mix RVW 1:20 w/v, HS 0.5 ml [place] Tree, Black 1:20 w/v, HS 0.5 ml [place], Black 1:20 w/v, HS 0.5 ml Diluent: HSA qs to 5ml, Disp: 5 mL, Rfl: PRN     ORDER FOR ALLERGEN IMMUNOTHERAPY, Kochia 1:20 w/v, HS 1.0 ml Nettle 1:20 w/v, HS 1.0 ml Plantain, English 1:20 w/v, HS 1.0 ml Ragweed Mixed 1:20 w/v ALK  0.6 ml Russian Thistle 1:20 w/v, HS 1.0 ml Diluent: HSA qs to 5ml, Disp: 5 mL, Rfl: PRN     albuterol (PROAIR HFA/PROVENTIL HFA/VENTOLIN HFA) 108 (90 BASE) MCG/ACT Inhaler, Inhale 2 puffs into the lungs every 4 hours as needed, Disp: 1 Inhaler, Rfl: 3     levonorgestrel (MIRENA) 20 MCG/24HR IUD, 1 each (20 mcg) by Intrauterine route continuous, Disp: , Rfl:      cetirizine (ZYRTEC) 10 MG tablet, Take 1 tablet (10 mg) by mouth every evening, Disp: 30 tablet, Rfl: 11     DiphenhydrAMINE HCl (BENADRYL PO), Take 50 mg by mouth nightly as needed for allergies or sleep, Disp: , Rfl:      EPINEPHrine (EPIPEN 2-ODETTE) 0.3 MG/0.3ML injection, Inject 0.3 mLs (0.3 mg) into the muscle once as needed for anaphylaxis,  Disp: 0.6 mL, Rfl: 3     ziprasidone (GEODON) 40 MG capsule, Take 20 mg by mouth At Bedtime , Disp: , Rfl: 5     fluticasone (FLONASE) 50 MCG/ACT spray, Spray 1-2 sprays into both nostrils daily (Patient not taking: Reported on 3/13/2018), Disp: 16 g, Rfl: 11     cyclobenzaprine (FLEXERIL) 10 MG tablet, Take 0.5-1 tablets (5-10 mg) by mouth At Bedtime (Patient not taking: Reported on 8/1/2017), Disp: 30 tablet, Rfl: 0     permethrin 1 % LIQD, Apply to clean, towel-dried hair, saturate hair and scalp, wash off after 10 min. (Patient not taking: Reported on 9/15/2017), Disp: 60 mL, Rfl: 1  Immunization History   Administered Date(s) Administered     Influenza (IIV3) PF 12/06/2012     Influenza Vaccine IM 3yrs+ 4 Valent IIV4 11/21/2017     TDAP Vaccine (Adacel) 12/06/2012     Allergies   Allergen Reactions     Animal Dander      Nkda [No Known Drug Allergies]      Seasonal Allergies      Weeds and mold         EXAM:   Constitutional:  Appears well-developed and well-nourished. No distress.   HEENT:   Head: Normocephalic.   Mouth/Throat: No oropharyngeal exudate present.   No cobblestoning of posterior oropharynx.   Nasal tissue pink and normal appearing.  No rhinorrhea noted.    Eyes: Conjunctivae are non-erythematous   Cardiovascular: Normal rate, regular rhythm and normal heart sounds. Exam reveals no gallop and no friction rub.   No murmur heard.  Respiratory: Effort normal and breath sounds normal. No respiratory distress. No wheezes. No rales.   Musculoskeletal: Normal range of motion.   Neuro: Oriented to person, place, and time.  Skin: Skin is warm and dry. No rash noted.   Psychiatric: Normal mood and affect.     Nursing note and vitals reviewed.      WORKUP:   Cluster immunotherapy   The patient received green 0.1, green 0.4 and blue 0.1ml today. Each dose was  by 30 minutes. Full report will be scanned into electronic medical record. The pateint was in office for over 1.5  hours.    ASSESSMENT/PLAN:  Problem List Items Addressed This Visit        Respiratory    Seasonal allergic rhinitis due to pollen - Primary     History of nasal and ocular symptoms for multiple years. Perennial with spring and fall worsening. Tried cetirizine and loratadine which were helpful. Currently used Allegra as needed. Allergy testing done in 2013 positive for dust mite, cat, dog, molds, trees, grasses and weeds. Tolerated cluster immunotherapy visit 1 today.      Skin testing  Positive for cat, dog, dust mites, weeds, trees and molds.       - Flonase 2 spray/nostril daily. Use spring and fall at the very least.   - Allegra or Zyrtec as needed and on allergy shot days.    - Return to clinic in 1 week for cluster immunotherapy.          Relevant Orders    RAPID DESENSITIZATION (Completed)    Allergic rhinitis due to mold    Relevant Orders    RAPID DESENSITIZATION (Completed)    Allergic rhinitis due to animal dander    Relevant Orders    RAPID DESENSITIZATION (Completed)    Allergic rhinitis due to dust mite    Relevant Orders    RAPID DESENSITIZATION (Completed)          Chart documentation with Dragon Voice recognition Software. Although reviewed after completion, some words and grammatical errors may remain.    Magdy Blevins,    Allergy/Immunology  [facility], [place], [place]

## 2018-03-13 NOTE — LETTER
3/13/2018         RE: Maddy Ortiz  [address]   [facility] [address]        Dear Colleague,    Thank you for referring your patient, Maddy Ortiz, to the [facility]. Please see a copy of my visit note below.    Maddy Ortiz is a 32 year old  female with previous medical history significant for allergic rhinitis who returns for a follow up visit. Maddy Ortiz is being seen today for asthma and seasonal allergies.     Patient presents today for cluster immunotherapy. The patient is currently in a good state of health. No recent fevers, chills, cough, wheezing, shortness of breath, skin rash, angioedema, nausea, vomiting or diarrhea. The risks and benefits were discussed and the patient/patient's family wishes to proceed. The consent was signed.    Past Medical History:   Diagnosis Date     Bipolar 1 disorder (H) 12/6/2012     Paranoid type delusional disorder (H) 11/14/2012     Family History   Problem Relation Age of Onset     Adopted: Yes     Unknown/Adopted Mother      Unknown/Adopted Father      Unknown/Adopted Other      Past Surgical History:   Procedure Laterality Date     TONSILLECTOMY & ADENOIDECTOMY      as a child       REVIEW OF SYSTEMS:  General: negative for weight gain. positive  for weight loss. negative for changes in sleep.   Ears: negative for fullness. negative for hearing loss. negative for dizziness.   Nose: negative for snoring.negative for changes in smell. negative for drainage.   Throat: negative for hoarseness. negative for sore throat. negative for trouble swallowing.   Lungs: negative for shortness of breath.negative for wheezing. negative for sputum production.   Cardiovascular: negative for chest pain. negative for swelling of ankles. negative for fast or irregular heartbeat.   Gastrointestinal: negative for nausea. negative for heartburn. negative for acid reflux.   Musculoskeletal: negative for joint pain. negative for joint stiffness.  negative for joint swelling.   Neurologic: negative for seizures. negative for fainting. negative for weakness.   Psychiatric: negative for changes in mood. negative for anxiety.   Endocrine: negative for cold intolerance. negative for heat intolerance. negative for tremors.   Hematologic: negative for easy bruising. negative for easy bleeding.  Integumentary: negative for rash. negative for scaling. negative for nail changes.       Current Outpatient Prescriptions:      ORDER FOR ALLERGEN IMMUNOTHERAPY, Cat Hair, Standardized 10,000 BAU/mL, ALK  3.0 ml Dog Hair Dander, A. P.  1:100 w/v, HS  1.0 ml Dust Mites F 30,000AU/mL, HS  0.5 ml Dust Mites P. 30,000 AU/mL, HS  0.5 ml  Diluent: HSA qs to 5ml, Disp: 5 mL, Rfl: PRN     ORDER FOR ALLERGEN IMMUNOTHERAPY, Alternaria Tenuis 1:10 w/v, HS  0.5 ml Epicoccum Nigrum 1:10 w/v, HS 0.5 ml Hormodendrum Cladosporioides 1:10 w/v, HS 0.5 ml Diluent: HSA qs to 5ml, Disp: 5 mL, Rfl: PRN     ORDER FOR ALLERGEN IMMUNOTHERAPY, Estuardo, White  1:20 w/v, HS  0.5 ml Birch Mix PRW 1:20 w/v, HS  0.5 ml Elm, American 1:20 w/v, HS  0.5 ml Hackberry 1:20 w/v, HS 0.5 ml Hickory, Shagbark 1:20 w/v, HS  0.5 ml [place] Mix RW 1:20 w/v, HS 0.5 ml Oak Mix RVW 1:20 w/v, HS 0.5 ml [place] Tree, Black 1:20 w/v, HS 0.5 ml [place], Black 1:20 w/v, HS 0.5 ml Diluent: HSA qs to 5ml, Disp: 5 mL, Rfl: PRN     ORDER FOR ALLERGEN IMMUNOTHERAPY, Kochia 1:20 w/v, HS 1.0 ml Nettle 1:20 w/v, HS 1.0 ml Plantain, English 1:20 w/v, HS 1.0 ml Ragweed Mixed 1:20 w/v ALK  0.6 ml Russian Thistle 1:20 w/v, HS 1.0 ml Diluent: HSA qs to 5ml, Disp: 5 mL, Rfl: PRN     albuterol (PROAIR HFA/PROVENTIL HFA/VENTOLIN HFA) 108 (90 BASE) MCG/ACT Inhaler, Inhale 2 puffs into the lungs every 4 hours as needed, Disp: 1 Inhaler, Rfl: 3     levonorgestrel (MIRENA) 20 MCG/24HR IUD, 1 each (20 mcg) by Intrauterine route continuous, Disp: , Rfl:      cetirizine (ZYRTEC) 10 MG tablet, Take 1 tablet (10 mg) by mouth every evening, Disp: 30  tablet, Rfl: 11     DiphenhydrAMINE HCl (BENADRYL PO), Take 50 mg by mouth nightly as needed for allergies or sleep, Disp: , Rfl:      EPINEPHrine (EPIPEN 2-ODETTE) 0.3 MG/0.3ML injection, Inject 0.3 mLs (0.3 mg) into the muscle once as needed for anaphylaxis, Disp: 0.6 mL, Rfl: 3     ziprasidone (GEODON) 40 MG capsule, Take 20 mg by mouth At Bedtime , Disp: , Rfl: 5     fluticasone (FLONASE) 50 MCG/ACT spray, Spray 1-2 sprays into both nostrils daily (Patient not taking: Reported on 3/13/2018), Disp: 16 g, Rfl: 11     cyclobenzaprine (FLEXERIL) 10 MG tablet, Take 0.5-1 tablets (5-10 mg) by mouth At Bedtime (Patient not taking: Reported on 8/1/2017), Disp: 30 tablet, Rfl: 0     permethrin 1 % LIQD, Apply to clean, towel-dried hair, saturate hair and scalp, wash off after 10 min. (Patient not taking: Reported on 9/15/2017), Disp: 60 mL, Rfl: 1  Immunization History   Administered Date(s) Administered     Influenza (IIV3) PF 12/06/2012     Influenza Vaccine IM 3yrs+ 4 Valent IIV4 11/21/2017     TDAP Vaccine (Adacel) 12/06/2012     Allergies   Allergen Reactions     Animal Dander      Nkda [No Known Drug Allergies]      Seasonal Allergies      Weeds and mold         EXAM:   Constitutional:  Appears well-developed and well-nourished. No distress.   HEENT:   Head: Normocephalic.   Mouth/Throat: No oropharyngeal exudate present.   No cobblestoning of posterior oropharynx.   Nasal tissue pink and normal appearing.  No rhinorrhea noted.    Eyes: Conjunctivae are non-erythematous   Cardiovascular: Normal rate, regular rhythm and normal heart sounds. Exam reveals no gallop and no friction rub.   No murmur heard.  Respiratory: Effort normal and breath sounds normal. No respiratory distress. No wheezes. No rales.   Musculoskeletal: Normal range of motion.   Neuro: Oriented to person, place, and time.  Skin: Skin is warm and dry. No rash noted.   Psychiatric: Normal mood and affect.     Nursing note and vitals  reviewed.      WORKUP:   Cluster immunotherapy   The patient received green 0.1, green 0.4 and blue 0.1ml today. Each dose was  by 30 minutes. Full report will be scanned into electronic medical record. The pateint was in office for over 1.5 hours.    ASSESSMENT/PLAN:  Problem List Items Addressed This Visit        Respiratory    Seasonal allergic rhinitis due to pollen - Primary     History of nasal and ocular symptoms for multiple years. Perennial with spring and fall worsening. Tried cetirizine and loratadine which were helpful. Currently used Allegra as needed. Allergy testing done in 2013 positive for dust mite, cat, dog, molds, trees, grasses and weeds. Tolerated cluster immunotherapy visit 1 today.      Skin testing  Positive for cat, dog, dust mites, weeds, trees and molds.       - Flonase 2 spray/nostril daily. Use spring and fall at the very least.   - Allegra or Zyrtec as needed and on allergy shot days.    - Return to clinic in 1 week for cluster immunotherapy.          Relevant Orders    RAPID DESENSITIZATION (Completed)    Allergic rhinitis due to mold    Relevant Orders    RAPID DESENSITIZATION (Completed)    Allergic rhinitis due to animal dander    Relevant Orders    RAPID DESENSITIZATION (Completed)    Allergic rhinitis due to dust mite    Relevant Orders    RAPID DESENSITIZATION (Completed)          Chart documentation with Dragon Voice recognition Software. Although reviewed after completion, some words and grammatical errors may remain.    Magdy Blevins, DO   Allergy/Immunology  [facility], [place] [facility] [place]      Again, thank you for allowing me to participate in the care of your patient.        Sincerely,        Magdy Blevins, DO

## 2018-03-13 NOTE — ASSESSMENT & PLAN NOTE
History of nasal and ocular symptoms for multiple years. Perennial with spring and fall worsening. Tried cetirizine and loratadine which were helpful. Currently used Allegra as needed. Allergy testing done in 2013 positive for dust mite, cat, dog, molds, trees, grasses and weeds. Tolerated cluster immunotherapy visit 1 today.      Skin testing  Positive for cat, dog, dust mites, weeds, trees and molds.       - Flonase 2 spray/nostril daily. Use spring and fall at the very least.   - Allegra or Zyrtec as needed and on allergy shot days.    - Return to clinic in 1 week for cluster immunotherapy.

## 2018-03-13 NOTE — PATIENT INSTRUCTIONS
Allergy Staff Appt Hours Shot Hours Locations    Physician     Magdy Blevins DO       Support Staff     Nessa SIERRA RN      Shirley SILVA MA  Monday:                      [place] 8-7     Tuesday:         [place] 8-5     Wednesday:        [place]: 7-5     Friday:        [place] 7-5   [place]        Monday: 9-5:50        Wednesday: 2-5:50        Friday: 7-12:50     [place]        Tuesday: 7-10:50        Thursday: 1:30-6:30     [place] Monday: 7:10-4:50        Tuesday: 12:30-6:30        Thursday: 7-11:50 [facility]  [address] [place] [address]  Appt Line: [phone]  Allergy RN (Monday):  [phone]    [facility]  [address] [place] [address]  Appt Line: [phone]  Allergy RN (Tues & Wed):  [phone]    [facility]  [address] [place] [address]  Appt Line: [phone]  Allergy RN (Friday):  [phone]       Important Scheduling Information  Aspirin Desensitization: Appt will last 2 clinic days. Please call the Allergy RN line for your clinic to schedule. Discontinue antihistamines 7 days prior to the appointment.     Food Challenges: Appt will last 3-4 hours. Please call the Allergy RN line for your clinic to schedule. Discontinue antihistamines 7 days prior to the appointment.     Penicillin Testing: Appt will last 2-3 hours. Please call the Allergy RN line for your clinic to schedule. Discontinue antihistamines 7 days prior to the appointment.     Skin Testing: Appt will about 40 minutes. Call the appointment line for your clinic to schedule. Discontinue antihistamines 7 days prior to the appointment.     Venom Testing: Appt will last 2-3 hours. Please call the Allergy RN line for your clinic to schedule. Discontinue antihistamines 7 days prior to the appointment.     Thank you for trusting us with your Allergy, Asthma, and Immunology care. Please feel free to contact us with any questions or concerns you may have.

## 2018-03-20 ENCOUNTER — OFFICE VISIT (OUTPATIENT)
Dept: ALLERGY | Facility: OTHER | Age: 33
End: 2018-03-20
Payer: COMMERCIAL

## 2018-03-20 ENCOUNTER — OFFICE VISIT (OUTPATIENT)
Dept: FAMILY MEDICINE | Facility: CLINIC | Age: 33
End: 2018-03-20
Payer: COMMERCIAL

## 2018-03-20 VITALS
WEIGHT: 194 LBS | HEART RATE: 106 BPM | DIASTOLIC BLOOD PRESSURE: 82 MMHG | TEMPERATURE: 97.9 F | SYSTOLIC BLOOD PRESSURE: 128 MMHG | RESPIRATION RATE: 20 BRPM | OXYGEN SATURATION: 97 % | BODY MASS INDEX: 32.72 KG/M2

## 2018-03-20 VITALS
TEMPERATURE: 97.4 F | OXYGEN SATURATION: 98 % | DIASTOLIC BLOOD PRESSURE: 109 MMHG | SYSTOLIC BLOOD PRESSURE: 155 MMHG | HEART RATE: 98 BPM

## 2018-03-20 DIAGNOSIS — J30.89 ALLERGIC RHINITIS DUE TO DUST MITE: ICD-10-CM

## 2018-03-20 DIAGNOSIS — J30.1 CHRONIC SEASONAL ALLERGIC RHINITIS DUE TO POLLEN: ICD-10-CM

## 2018-03-20 DIAGNOSIS — M54.5 ACUTE LOW BACK PAIN, UNSPECIFIED BACK PAIN LATERALITY, WITH SCIATICA PRESENCE UNSPECIFIED: Primary | ICD-10-CM

## 2018-03-20 DIAGNOSIS — Z63.8 PARENTAL CONCERN ABOUT CHILD: ICD-10-CM

## 2018-03-20 DIAGNOSIS — J30.81 ALLERGIC RHINITIS DUE TO ANIMAL DANDER: Primary | ICD-10-CM

## 2018-03-20 DIAGNOSIS — J30.89 ALLERGIC RHINITIS DUE TO MOLD: ICD-10-CM

## 2018-03-20 PROCEDURE — 95180 RAPID DESENSITIZATION: CPT | Performed by: ALLERGY & IMMUNOLOGY

## 2018-03-20 PROCEDURE — 99207 ZZC DROP WITH A PROCEDURE: CPT | Mod: 25 | Performed by: ALLERGY & IMMUNOLOGY

## 2018-03-20 PROCEDURE — 99213 OFFICE O/P EST LOW 20 MIN: CPT | Performed by: PHYSICIAN ASSISTANT

## 2018-03-20 SDOH — SOCIAL STABILITY - SOCIAL INSECURITY: OTHER SPECIFIED PROBLEMS RELATED TO PRIMARY SUPPORT GROUP: Z63.8

## 2018-03-20 NOTE — LETTER
[facility]  [address] [facility] [place] [address]  Phone: [phone]    March 20, 2018        Maddy Ortiz  [address] [facility] [address]  [facility] [id number]          To whom it may concern:    RE: Maddy Ortiz    Patient was seen and treated today at our clinic today for back pain. Please allow her to sleep on the floor.    Please contact me for questions or concerns.      Sincerely,        Laquita Hager PA-C

## 2018-03-20 NOTE — ASSESSMENT & PLAN NOTE
History of nasal and ocular symptoms for multiple years. Perennial with spring and fall worsening. Tried cetirizine and loratadine which were helpful. No use of nasal corticosteroid. Allergy testing done in 2013 positive for dust mite, cat, dog, molds, trees, grasses and weeds. Tolerated cluster immunotherapy as noted.       Skin testing  Positive for cat, dog, dust mites, weeds, trees and molds.       - Flonase 2 spray/nostril daily. Use spring and fall at the very least.   - Cetirizine as needed and on allergy shot days.   - P[place]o 1 gtt/eye daily as needed.   - Continue allergen immunotherapy.

## 2018-03-20 NOTE — PATIENT INSTRUCTIONS
Allergy Staff Appt Hours Shot Hours Locations    Physician     Magdy Blevins, DO       Support Staff     Nessa SIERRA RN      Neeru SIERRA, [facility]  Monday:                      [place] 8-7     Tuesday:         [place] 8-5     Wednesday:        [place]: 7-5     Friday:        [place] 7-5   [place]        Monday: 9-5:50        Wednesday: 2-5:50        Friday: 7-12:50     [place]        Tuesday: 7-10:50        Thursday: 1:30-6:30     [place] Monday: 7:10-4:50        Tuesday: 12:30-6:30        Thursday: 7-11:50 [facility]  [address] [place] [address]  Appt Line: [phone]  Allergy RN (Monday):  [phone]    [facility]  [address] [place] [address]  Appt Line: [phone]  Allergy RN (Tues & Wed):  [phone]    [facility]  [address] [place] [address]  Appt Line: [phone]  Allergy RN (Friday):  [phone]       Important Scheduling Information  Aspirin Desensitization: Appt will last 2 clinic days. Please call the Allergy RN line for your clinic to schedule. Discontinue antihistamines 7 days prior to the appointment.     Food Challenges: Appt will last 3-4 hours. Please call the Allergy RN line for your clinic to schedule. Discontinue antihistamines 7 days prior to the appointment.     Penicillin Testing: Appt will last 2-3 hours. Please call the Allergy RN line for your clinic to schedule. Discontinue antihistamines 7 days prior to the appointment.     Skin Testing: Appt will about 40 minutes. Call the appointment line for your clinic to schedule. Discontinue antihistamines 7 days prior to the appointment.     Venom Testing: Appt will last 2-3 hours. Please call the Allergy RN line for your clinic to schedule. Discontinue antihistamines 7 days prior to the appointment.     Thank you for trusting us with your Allergy, Asthma, and Immunology care. Please feel free to contact us with any questions or concerns you may have.

## 2018-03-20 NOTE — MR AVS SNAPSHOT
After Visit Summary   3/20/2018    Maddy Ortiz    MRN: [id number]           Patient Information     Date Of Birth          1985        Visit Information        Provider Department      3/20/2018 3:40 PM Laquita Hager PA-C [facility]        Today's Diagnoses     Acute low back pain, unspecified back pain laterality, with sciatica presence unspecified    -  1    Parental concern about child           Follow-ups after your visit        Your next 10 appointments already scheduled     Mar 27, 2018  2:20 PM CDT   [facility]    [address] [address]   [phone]            Apr 05, 2018  1:30 PM CDT   Nurse Only with ER ALLERGY SHOTS   [facility]    [address] [address]   [phone]            Apr 10, 2018  1:00 PM CDT   [facility] Visit [facility]   [facility]    [address] [address]   [phone]            Apr 17, 2018  1:00 PM CDT   [facility] Visit [facility]    [address] [address]   [phone]            Apr 24, 2018  1:00 PM CDT   [facility]    [address] [place] [address]   [phone]              Who to contact     If you have questions or need follow up information about today's clinic visit or your schedule please contact [facility] directly at [phone].  Normal or non-critical lab and imaging results will be communicated to you by MyChart, letter or phone within 4 business days after the clinic has received the results. If you do not hear from us  "within 7 days, please contact the clinic through [employer] or phone. If you have a critical or abnormal lab result, we will notify you by phone as soon as possible.  Submit refill requests through [employer] or call your pharmacy and they will forward the refill request to us. Please allow 3 business days for your refill to be completed.          Additional Information About Your Visit        [employer] Information     [employer] lets you send messages to your doctor, view your test results, renew your prescriptions, schedule appointments and more. To sign up, go to [email or web address] . Click on \"Log in\" on the left side of the screen, which will take you to the Welcome page. Then click on \"Sign up Now\" on the right side of the page.     You will be asked to enter the access code listed below, as well as some personal information. Please follow the directions to create your username and password.     Your access code is: [email or web address]  Expires: 2018  3:10 PM     Your access code will  in 90 days. If you need help or a new code, please call your [place] clinic or [phone].        Care EveryWhere ID     This is your Care EveryWhere ID. This could be used by other organizations to access your [place] medical records  [id number]        Your Vitals Were     Pulse Temperature Pulse Oximetry             98 97.4  F (36.3  C) (Oral) 98%          Blood Pressure from Last 3 Encounters:   18 (!) 155/109   18 128/82   18 122/78    Weight from Last 3 Encounters:   18 194 lb (88 kg)   18 192 lb 8 oz (87.3 kg)   18 187 lb 8 oz (85 kg)              Today, you had the following     No orders found for display       Primary Care Provider Office Phone # Fax #    [facility] [phone] [address] [facility] [address]        Equal Access to Services     ARMOND MCKEON AH: pancho Machado, [place] do " paulino erica hummel'aan ah. So [facility] [phone].    ATENCIÓN: Si elida west, tiene a titus disposición servicios gratuitos de asistencia lingüística. Yadira al [phone].    We comply with applicable federal civil rights laws and [place] laws. We do not discriminate on the basis of race, color, national origin, age, disability, sex, sexual orientation, or gender identity.            Thank you!     Thank you for choosing [facility]  for your care. Our goal is always to provide you with excellent care. Hearing back from our patients is one way we can continue to improve our services. Please take a few minutes to complete the written survey that you may receive in the mail after your visit with us. Thank you!             Your Updated Medication List - Protect others around you: Learn how to safely use, store and throw away your medicines at www.disposemymeds.org.          This list is accurate as of 3/20/18  4:31 PM.  Always use your most recent med list.                   Brand Name Dispense Instructions for use Diagnosis    albuterol 108 (90 BASE) MCG/ACT Inhaler    PROAIR HFA/PROVENTIL HFA/VENTOLIN HFA    1 Inhaler    Inhale 2 puffs into the lungs every 4 hours as needed    SOB (shortness of breath)       * ALLERGEN IMMUNOTHERAPY PRESCRIPTION     5 mL    Cat Hair, Standardized 10,000 BAU/mL, ALK  3.0 ml Dog Hair Dander, A. P.  1:100 w/v, HS  1.0 ml Dust Mites F 30,000AU/mL, HS  0.5 ml Dust Mites P. 30,000 AU/mL, HS  0.5 ml  Diluent: HSA qs to 5ml    Allergic rhinitis due to animal dander, Allergic rhinitis due to dust mite       * ALLERGEN IMMUNOTHERAPY PRESCRIPTION     5 mL    Alternaria Tenuis 1:10 w/v, HS  0.5 ml Epicoccum Nigrum 1:10 w/v, HS 0.5 ml Hormodendrum Cladosporioides 1:10 w/v, HS 0.5 ml Diluent: HSA qs to 5ml    Allergic rhinitis due to mold       * ALLERGEN IMMUNOTHERAPY PRESCRIPTION     5 mL    Estuardo, White  1:20 w/v, HS  0.5 ml Birch Mix PRW 1:20 w/v, HS  0.5 ml Elm,  American 1:20 w/v, HS  0.5 ml Hackberry 1:20 w/v, HS 0.5 ml Hickory, Shagbark 1:20 w/v, HS  0.5 ml [place] Mix RW 1:20 w/v, HS 0.5 ml Oak Mix RVW 1:20 w/v, HS 0.5 ml [place] Tree, Black 1:20 w/v, HS 0.5 ml Winn, Black 1:20 w/v, HS 0.5 ml Diluent: HSA qs to 5ml    Chronic seasonal allergic rhinitis due to pollen       * ALLERGEN IMMUNOTHERAPY PRESCRIPTION     5 mL    Kochia 1:20 w/v, HS 1.0 ml Nettle 1:20 w/v, HS 1.0 ml Plantain, English 1:20 w/v, HS 1.0 ml Ragweed Mixed 1:20 w/v ALK  0.6 ml Russian Thistle 1:20 w/v, HS 1.0 ml Diluent: HSA qs to 5ml    Chronic seasonal allergic rhinitis due to pollen       BENADRYL PO      Take 50 mg by mouth nightly as needed for allergies or sleep        cetirizine 10 MG tablet    zyrTEC    30 tablet    Take 1 tablet (10 mg) by mouth every evening    Chronic seasonal allergic rhinitis due to pollen       cyclobenzaprine 10 MG tablet    FLEXERIL    30 tablet    Take 0.5-1 tablets (5-10 mg) by mouth At Bedtime    Sacral back pain       EPINEPHrine 0.3 MG/0.3ML injection 2-pack    EPIPEN 2-ODETTE    0.6 mL    Inject 0.3 mLs (0.3 mg) into the muscle once as needed for anaphylaxis    Need for desensitization to allergens       fluticasone 50 MCG/ACT spray    FLONASE    16 g    Spray 1-2 sprays into both nostrils daily    Allergic rhinitis due to animal dander, Allergic rhinitis due to dust mite, Allergic rhinitis due to mold, Chronic seasonal allergic rhinitis due to pollen       levonorgestrel 20 MCG/24HR IUD    MIRENA     1 each (20 mcg) by Intrauterine route continuous    Encounter for insertion of mirena IUD       permethrin 1 % Liqd     60 mL    Apply to clean, towel-dried hair, saturate hair and scalp, wash off after 10 min.    Lice       ziprasidone 40 MG capsule    GEODON     Take 20 mg by mouth At Bedtime        * Notice:  This list has 4 medication(s) that are the same as other medications prescribed for you. Read the directions carefully, and ask your doctor or other care  provider to review them with you.

## 2018-03-20 NOTE — MR AVS SNAPSHOT
After Visit Summary   3/20/2018    Maddy Ortiz    MRN: [id number]           Patient Information     Date Of Birth          1985        Visit Information        Provider Department      3/20/2018 1:40 PM Magdy Blevins DO [facility]        Today's Diagnoses     Allergic rhinitis due to animal dander    -  1    Allergic rhinitis due to dust mite        Allergic rhinitis due to mold        Chronic seasonal allergic rhinitis due to pollen          Care Instructions    Allergy Staff Appt Hours Shot Hours Locations    Physician     Magdy Blevins DO       Support Staff     MARY ANNE Meneses, [facility]  Monday:                      [place] 8-7     Tuesday:         [place] 8-5     Wednesday:        [place]: 7-5     Friday:        [place] 7-5   [place]        Monday: 9-5:50        Wednesday: 2-5:50        Friday: 7-12:50     [place]        Tuesday: 7-10:50        Thursday: 1:30-6:30     [place] Monday: 7:10-4:50        Tuesday: 12:30-6:30        Thursday: 7-11:50 [facility]  [address] [place] [address]  Appt Line: [phone]  Allergy RN (Monday):  [phone]    [facility]  [address] [place] [address]  Appt Line: [phone]  Allergy RN (Tues & Wed):  [phone]    [facility]  [address] [place] [address]  Appt Line: [phone]  Allergy RN (Friday):  [phone]       Important Scheduling Information  Aspirin Desensitization: Appt will last 2 clinic days. Please call the Allergy RN line for your clinic to schedule. Discontinue antihistamines 7 days prior to the appointment.     Food Challenges: Appt will last 3-4 hours. Please call the Allergy RN line for your clinic to schedule. Discontinue antihistamines 7 days prior to the appointment.     Penicillin Testing: Appt will last 2-3 hours. Please call the Allergy RN line for your clinic to schedule. Discontinue antihistamines 7 days prior to the  appointment.     Skin Testing: Appt will about 40 minutes. Call the appointment line for your clinic to schedule. Discontinue antihistamines 7 days prior to the appointment.     Venom Testing: Appt will last 2-3 hours. Please call the Allergy RN line for your clinic to schedule. Discontinue antihistamines 7 days prior to the appointment.     Thank you for trusting us with your Allergy, Asthma, and Immunology care. Please feel free to contact us with any questions or concerns you may have.                Follow-ups after your visit        Your next 10 appointments already scheduled     Mar 20, 2018  3:40 PM CDT   Office Visit with Laquita Hager PA-C   [facility]    [address] [facility] [id number] [phone]           Bring a current list of meds and any records pertaining to this visit. For Physicals, please bring immunization records and any forms needing to be filled out. Please arrive 10 minutes early to complete paperwork.            Mar 27, 2018  2:20 PM CDT   Return Visit with Magdy Blevins DO   [facility]    [address] [place] [address]   [phone]            Apr 05, 2018  1:30 PM CDT   Nurse Only with ER ALLERGY SHOTS   [facility] ([facility]    [address] [place] [address]   [phone]            Apr 10, 2018  1:00 PM CDT   Return Visit with Magdy Blevins DO   [facility] ([facility]    [address] [place] [address]   [phone]            Apr 17, 2018  1:00 PM CDT   Return Visit with Magdy Blevins DO   [facility] ([facility]    [address] [place] [address]   [phone]            Apr 24, 2018  1:00 PM CDT   Return Visit with Magdy Blevins DO   [facility] ([place]  "[facility])    [address] [facility] [address]  [facility] [address] [phone]              Who to contact     If you have questions or need follow up information about today's clinic visit or your schedule please contact [facility] directly at [phone].  Normal or non-critical lab and imaging results will be communicated to you by MyChart, letter or phone within 4 business days after the clinic has received the results. If you do not hear from us within 7 days, please contact the clinic through MyChart or phone. If you have a critical or abnormal lab result, we will notify you by phone as soon as possible.  Submit refill requests through [employer] or call your pharmacy and they will forward the refill request to us. Please allow 3 business days for your refill to be completed.          Additional Information About Your Visit        [employer]zehart Information     [employer] lets you send messages to your doctor, view your test results, renew your prescriptions, schedule appointments and more. To sign up, go to [email or web address] . Click on \"Log in\" on the left side of the screen, which will take you to the Welcome page. Then click on \"Sign up Now\" on the right side of the page.     You will be asked to enter the access code listed below, as well as some personal information. Please follow the directions to create your username and password.     Your access code is: [email or web address]  Expires: 2018  3:10 PM     Your access code will  in 90 days. If you need help or a new code, please call your [place] clinic or [phone].        Care EveryWhere ID     This is your Care EveryWhere ID. This could be used by other organizations to access your [place] medical records  [id number]        Your Vitals Were     Pulse Temperature Respirations Pulse Oximetry BMI (Body Mass Index)       106 97.9  F (36.6  C) (Oral) 20 97% 32.72 kg/m2        Blood Pressure from Last 3 Encounters:   18 " 128/82   03/13/18 122/78   03/06/18 (!) 132/94    Weight from Last 3 Encounters:   03/20/18 88 kg (194 lb)   03/13/18 87.3 kg (192 lb 8 oz)   03/06/18 85 kg (187 lb 8 oz)              We Performed the Following     RAPID DESENSITIZATION        Primary Care Provider Office Phone # Fax #    [facility] [phone] [address] [facility] [address]        Equal Access to Services     ARMOND MCKEON : Hadii aad ku hadasho Soomaali, waaxda luqadaha, qaybta kaalmada adeegyada, waxay idiin hayaan adeeg odny maciel . So [facility] [phone].    ATENCIÓN: Si habla español, tiene a titus disposición servicios gratuitos de asistencia lingüística. Llame al [phone].    We comply with applicable federal civil rights laws and [place] laws. We do not discriminate on the basis of race, color, national origin, age, disability, sex, sexual orientation, or gender identity.            Thank you!     Thank you for choosing [facility]  for your care. Our goal is always to provide you with excellent care. Hearing back from our patients is one way we can continue to improve our services. Please take a few minutes to complete the written survey that you may receive in the mail after your visit with us. Thank you!             Your Updated Medication List - Protect others around you: Learn how to safely use, store and throw away your medicines at www.disposemymeds.org.          This list is accurate as of 3/20/18  3:30 PM.  Always use your most recent med list.                   Brand Name Dispense Instructions for use Diagnosis    albuterol 108 (90 BASE) MCG/ACT Inhaler    PROAIR HFA/PROVENTIL HFA/VENTOLIN HFA    1 Inhaler    Inhale 2 puffs into the lungs every 4 hours as needed    SOB (shortness of breath)       * ALLERGEN IMMUNOTHERAPY PRESCRIPTION     5 mL    Cat Hair, Standardized 10,000 BAU/mL, ALK  3.0 ml Dog Hair Dander, A. P.  1:100 w/v, HS  1.0 ml Dust Mites F 30,000AU/mL, HS   0.5 ml Dust Mites P. 30,000 AU/mL, HS  0.5 ml  Diluent: HSA qs to 5ml    Allergic rhinitis due to animal dander, Allergic rhinitis due to dust mite       * ALLERGEN IMMUNOTHERAPY PRESCRIPTION     5 mL    Alternaria Tenuis 1:10 w/v, HS  0.5 ml Epicoccum Nigrum 1:10 w/v, HS 0.5 ml Hormodendrum Cladosporioides 1:10 w/v, HS 0.5 ml Diluent: HSA qs to 5ml    Allergic rhinitis due to mold       * ALLERGEN IMMUNOTHERAPY PRESCRIPTION     5 mL    Estuardo, White  1:20 w/v, HS  0.5 ml Birch Mix PRW 1:20 w/v, HS  0.5 ml Elm, American 1:20 w/v, HS  0.5 ml Hackberry 1:20 w/v, HS 0.5 ml Hickory, Shagbark 1:20 w/v, HS  0.5 ml [place] Mix RW 1:20 w/v, HS 0.5 ml Oak Mix RVW 1:20 w/v, HS 0.5 ml [place] Tree, Black 1:20 w/v, HS 0.5 ml [place], Black 1:20 w/v, HS 0.5 ml Diluent: HSA qs to 5ml    Chronic seasonal allergic rhinitis due to pollen       * ALLERGEN IMMUNOTHERAPY PRESCRIPTION     5 mL    Kochia 1:20 w/v, HS 1.0 ml Nettle 1:20 w/v, HS 1.0 ml Plantain, English 1:20 w/v, HS 1.0 ml Ragweed Mixed 1:20 w/v ALK  0.6 ml Russian Thistle 1:20 w/v, HS 1.0 ml Diluent: HSA qs to 5ml    Chronic seasonal allergic rhinitis due to pollen       BENADRYL PO      Take 50 mg by mouth nightly as needed for allergies or sleep        cetirizine 10 MG tablet    zyrTEC    30 tablet    Take 1 tablet (10 mg) by mouth every evening    Chronic seasonal allergic rhinitis due to pollen       cyclobenzaprine 10 MG tablet    FLEXERIL    30 tablet    Take 0.5-1 tablets (5-10 mg) by mouth At Bedtime    Sacral back pain       EPINEPHrine 0.3 MG/0.3ML injection 2-pack    EPIPEN 2-ODETTE    0.6 mL    Inject 0.3 mLs (0.3 mg) into the muscle once as needed for anaphylaxis    Need for desensitization to allergens       fluticasone 50 MCG/ACT spray    FLONASE    16 g    Spray 1-2 sprays into both nostrils daily    Allergic rhinitis due to animal dander, Allergic rhinitis due to dust mite, Allergic rhinitis due to mold, Chronic seasonal allergic rhinitis due to pollen        levonorgestrel 20 MCG/24HR IUD    MIRENA     1 each (20 mcg) by Intrauterine route continuous    Encounter for insertion of mirena IUD       permethrin 1 % Liqd     60 mL    Apply to clean, towel-dried hair, saturate hair and scalp, wash off after 10 min.    Lice       ziprasidone 40 MG capsule    GEODON     Take 20 mg by mouth At Bedtime        * Notice:  This list has 4 medication(s) that are the same as other medications prescribed for you. Read the directions carefully, and ask your doctor or other care provider to review them with you.

## 2018-03-20 NOTE — PROGRESS NOTES
Maddy Ortiz is a 32 year old  female with previous medical history significant for allergic rhinitis who returns for a follow up visit. Maddy Ortiz is being seen today for asthma and seasonal allergies.      Patient presents today for cluster immunotherapy. The patient is currently in a good state of health. No recent fevers, chills, cough, wheezing, shortness of breath, skin rash, angioedema, nausea, vomiting or diarrhea. The risks and benefits were discussed and the patient/patient's family wishes to proceed. The consent was signed.    Past Medical History:   Diagnosis Date     Bipolar 1 disorder (H) 12/6/2012     Paranoid type delusional disorder (H) 11/14/2012     Family History   Problem Relation Age of Onset     Adopted: Yes     Unknown/Adopted Mother      Unknown/Adopted Father      Unknown/Adopted Other      Past Surgical History:   Procedure Laterality Date     TONSILLECTOMY & ADENOIDECTOMY      as a child       REVIEW OF SYSTEMS:  General: negative for weight gain. negative for weight loss. negative for changes in sleep.   Ears: negative for fullness. negative for hearing loss. negative for dizziness.   Nose: negative for snoring.negative for changes in smell. negative for drainage.   Throat: negative for hoarseness. negative for sore throat. negative for trouble swallowing.   Lungs: negative for shortness of breath.negative for wheezing. negative for sputum production.   Cardiovascular: negative for chest pain. negative for swelling of ankles. negative for fast or irregular heartbeat.   Gastrointestinal: negative for nausea. negative for heartburn. negative for acid reflux.   Musculoskeletal: negative for joint pain. negative for joint stiffness. negative for joint swelling.   Neurologic: negative for seizures. negative for fainting. negative for weakness.   Psychiatric: negative for changes in mood. negative for anxiety.   Endocrine: negative for cold intolerance. negative for heat intolerance.  negative for tremors.   Hematologic: negative for easy bruising. negative for easy bleeding.  Integumentary: negative for rash. negative for scaling. negative for nail changes.       Current Outpatient Prescriptions:      ORDER FOR ALLERGEN IMMUNOTHERAPY, Cat Hair, Standardized 10,000 BAU/mL, ALK  3.0 ml Dog Hair Dander, A. P.  1:100 w/v, HS  1.0 ml Dust Mites F 30,000AU/mL, HS  0.5 ml Dust Mites P. 30,000 AU/mL, HS  0.5 ml  Diluent: HSA qs to 5ml, Disp: 5 mL, Rfl: PRN     ORDER FOR ALLERGEN IMMUNOTHERAPY, Alternaria Tenuis 1:10 w/v, HS  0.5 ml Epicoccum Nigrum 1:10 w/v, HS 0.5 ml Hormodendrum Cladosporioides 1:10 w/v, HS 0.5 ml Diluent: HSA qs to 5ml, Disp: 5 mL, Rfl: PRN     ORDER FOR ALLERGEN IMMUNOTHERAPY, Estuardo, White  1:20 w/v, HS  0.5 ml Birch Mix PRW 1:20 w/v, HS  0.5 ml Elm, American 1:20 w/v, HS  0.5 ml Hackberry 1:20 w/v, HS 0.5 ml Hickory, Shagbark 1:20 w/v, HS  0.5 ml [place] Mix RW 1:20 w/v, HS 0.5 ml Oak Mix RVW 1:20 w/v, HS 0.5 ml [place] Tree, Black 1:20 w/v, HS 0.5 ml [place], Black 1:20 w/v, HS 0.5 ml Diluent: HSA qs to 5ml, Disp: 5 mL, Rfl: PRN     ORDER FOR ALLERGEN IMMUNOTHERAPY, Kochia 1:20 w/v, HS 1.0 ml Nettle 1:20 w/v, HS 1.0 ml Plantain, English 1:20 w/v, HS 1.0 ml Ragweed Mixed 1:20 w/v ALK  0.6 ml Russian Thistle 1:20 w/v, HS 1.0 ml Diluent: HSA qs to 5ml, Disp: 5 mL, Rfl: PRN     albuterol (PROAIR HFA/PROVENTIL HFA/VENTOLIN HFA) 108 (90 BASE) MCG/ACT Inhaler, Inhale 2 puffs into the lungs every 4 hours as needed, Disp: 1 Inhaler, Rfl: 3     levonorgestrel (MIRENA) 20 MCG/24HR IUD, 1 each (20 mcg) by Intrauterine route continuous, Disp: , Rfl:      cetirizine (ZYRTEC) 10 MG tablet, Take 1 tablet (10 mg) by mouth every evening, Disp: 30 tablet, Rfl: 11     DiphenhydrAMINE HCl (BENADRYL PO), Take 50 mg by mouth nightly as needed for allergies or sleep, Disp: , Rfl:      EPINEPHrine (EPIPEN 2-ODETTE) 0.3 MG/0.3ML injection, Inject 0.3 mLs (0.3 mg) into the muscle once as needed for anaphylaxis,  Disp: 0.6 mL, Rfl: 3     ziprasidone (GEODON) 40 MG capsule, Take 20 mg by mouth At Bedtime , Disp: , Rfl: 5     fluticasone (FLONASE) 50 MCG/ACT spray, Spray 1-2 sprays into both nostrils daily (Patient not taking: Reported on 3/13/2018), Disp: 16 g, Rfl: 11     cyclobenzaprine (FLEXERIL) 10 MG tablet, Take 0.5-1 tablets (5-10 mg) by mouth At Bedtime (Patient not taking: Reported on 8/1/2017), Disp: 30 tablet, Rfl: 0     permethrin 1 % LIQD, Apply to clean, towel-dried hair, saturate hair and scalp, wash off after 10 min. (Patient not taking: Reported on 9/15/2017), Disp: 60 mL, Rfl: 1  Immunization History   Administered Date(s) Administered     Influenza (IIV3) PF 12/06/2012     Influenza Vaccine IM 3yrs+ 4 Valent IIV4 11/21/2017     TDAP Vaccine (Adacel) 12/06/2012     Allergies   Allergen Reactions     Animal Dander      Nkda [No Known Drug Allergies]      Seasonal Allergies      Weeds and mold         EXAM:   Constitutional:  Appears well-developed and well-nourished. No distress.   HEENT:   Head: Normocephalic.   Mouth/Throat: No oropharyngeal exudate present.   No cobblestoning of posterior oropharynx.   Nasal tissue pink and normal appearing.  No rhinorrhea noted.    Eyes: Conjunctivae are non-erythematous   Cardiovascular: Normal rate, regular rhythm and normal heart sounds. Exam reveals no gallop and no friction rub.   No murmur heard.  Respiratory: Effort normal and breath sounds normal. No respiratory distress. No wheezes. No rales.   Musculoskeletal: Normal range of motion.   Neuro: Oriented to person, place, and time.  Skin: Skin is warm and dry. No rash noted.   Psychiatric: Normal mood and affect.     Nursing note and vitals reviewed.      WORKUP:   Cluster immunotherapy   The patient received blue 0.2, blue 0.4, and yellow 0.05. Each dose was  by 30 minutes. Full report will be scanned into electronic medical record. The patient was in office for over 1.5  hours.    ASSESSMENT/PLAN:  Problem List Items Addressed This Visit        Respiratory    Seasonal allergic rhinitis due to pollen     History of nasal and ocular symptoms for multiple years. Perennial with spring and fall worsening. Tried cetirizine and loratadine which were helpful. Currently used Allegra as needed. Allergy testing done in 2013 positive for dust mite, cat, dog, molds, trees, grasses and weeds. Tolerated cluster immunotherapy visit 2 today.      Skin testing  Positive for cat, dog, dust mites, weeds, trees and molds.       - Flonase 2 spray/nostril daily. Use spring and fall at the very least.   - Allegra or Zyrtec as needed and on allergy shot days.   - Return to clinic in 1 week for cluster immunotherapy.          Relevant Orders    RAPID DESENSITIZATION (Completed)    Allergic rhinitis due to mold    Relevant Orders    RAPID DESENSITIZATION (Completed)    Allergic rhinitis due to animal dander - Primary    Relevant Orders    RAPID DESENSITIZATION (Completed)    Allergic rhinitis due to dust mite    Relevant Orders    RAPID DESENSITIZATION (Completed)          Chart documentation with Dragon Voice recognition Software. Although reviewed after completion, some words and grammatical errors may remain.    Magdy Blevins,    Allergy/Immunology  [facility], [place] and [place], [place]

## 2018-03-20 NOTE — LETTER
3/20/2018         RE: Maddy Ortiz  [address]   [facility] [address]        Dear Colleague,    Thank you for referring your patient, Maddy Ortiz, to the [facility]. Please see a copy of my visit note below.    Maddy Ortiz is a 32 year old  female with previous medical history significant for allergic rhinitis who returns for a follow up visit. Maddy Ortiz is being seen today for asthma and seasonal allergies.      Patient presents today for cluster immunotherapy. The patient is currently in a good state of health. No recent fevers, chills, cough, wheezing, shortness of breath, skin rash, angioedema, nausea, vomiting or diarrhea. The risks and benefits were discussed and the patient/patient's family wishes to proceed. The consent was signed.    Past Medical History:   Diagnosis Date     Bipolar 1 disorder (H) 12/6/2012     Paranoid type delusional disorder (H) 11/14/2012     Family History   Problem Relation Age of Onset     Adopted: Yes     Unknown/Adopted Mother      Unknown/Adopted Father      Unknown/Adopted Other      Past Surgical History:   Procedure Laterality Date     TONSILLECTOMY & ADENOIDECTOMY      as a child       REVIEW OF SYSTEMS:  General: negative for weight gain. negative for weight loss. negative for changes in sleep.   Ears: negative for fullness. negative for hearing loss. negative for dizziness.   Nose: negative for snoring.negative for changes in smell. negative for drainage.   Throat: negative for hoarseness. negative for sore throat. negative for trouble swallowing.   Lungs: negative for shortness of breath.negative for wheezing. negative for sputum production.   Cardiovascular: negative for chest pain. negative for swelling of ankles. negative for fast or irregular heartbeat.   Gastrointestinal: negative for nausea. negative for heartburn. negative for acid reflux.   Musculoskeletal: negative for joint pain. negative for joint stiffness.  negative for joint swelling.   Neurologic: negative for seizures. negative for fainting. negative for weakness.   Psychiatric: negative for changes in mood. negative for anxiety.   Endocrine: negative for cold intolerance. negative for heat intolerance. negative for tremors.   Hematologic: negative for easy bruising. negative for easy bleeding.  Integumentary: negative for rash. negative for scaling. negative for nail changes.       Current Outpatient Prescriptions:      ORDER FOR ALLERGEN IMMUNOTHERAPY, Cat Hair, Standardized 10,000 BAU/mL, ALK  3.0 ml Dog Hair Dander, A. P.  1:100 w/v, HS  1.0 ml Dust Mites F 30,000AU/mL, HS  0.5 ml Dust Mites P. 30,000 AU/mL, HS  0.5 ml  Diluent: HSA qs to 5ml, Disp: 5 mL, Rfl: PRN     ORDER FOR ALLERGEN IMMUNOTHERAPY, Alternaria Tenuis 1:10 w/v, HS  0.5 ml Epicoccum Nigrum 1:10 w/v, HS 0.5 ml Hormodendrum Cladosporioides 1:10 w/v, HS 0.5 ml Diluent: HSA qs to 5ml, Disp: 5 mL, Rfl: PRN     ORDER FOR ALLERGEN IMMUNOTHERAPY, Estuardo, White  1:20 w/v, HS  0.5 ml Birch Mix PRW 1:20 w/v, HS  0.5 ml Elm, American 1:20 w/v, HS  0.5 ml Hackberry 1:20 w/v, HS 0.5 ml Hickory, Shagbark 1:20 w/v, HS  0.5 ml [place] Mix RW 1:20 w/v, HS 0.5 ml Oak Mix RVW 1:20 w/v, HS 0.5 ml [place] Tree, Black 1:20 w/v, HS 0.5 ml [place], Black 1:20 w/v, HS 0.5 ml Diluent: HSA qs to 5ml, Disp: 5 mL, Rfl: PRN     ORDER FOR ALLERGEN IMMUNOTHERAPY, Kochia 1:20 w/v, HS 1.0 ml Nettle 1:20 w/v, HS 1.0 ml Plantain, English 1:20 w/v, HS 1.0 ml Ragweed Mixed 1:20 w/v ALK  0.6 ml Russian Thistle 1:20 w/v, HS 1.0 ml Diluent: HSA qs to 5ml, Disp: 5 mL, Rfl: PRN     albuterol (PROAIR HFA/PROVENTIL HFA/VENTOLIN HFA) 108 (90 BASE) MCG/ACT Inhaler, Inhale 2 puffs into the lungs every 4 hours as needed, Disp: 1 Inhaler, Rfl: 3     levonorgestrel (MIRENA) 20 MCG/24HR IUD, 1 each (20 mcg) by Intrauterine route continuous, Disp: , Rfl:      cetirizine (ZYRTEC) 10 MG tablet, Take 1 tablet (10 mg) by mouth every evening, Disp: 30  tablet, Rfl: 11     DiphenhydrAMINE HCl (BENADRYL PO), Take 50 mg by mouth nightly as needed for allergies or sleep, Disp: , Rfl:      EPINEPHrine (EPIPEN 2-ODETTE) 0.3 MG/0.3ML injection, Inject 0.3 mLs (0.3 mg) into the muscle once as needed for anaphylaxis, Disp: 0.6 mL, Rfl: 3     ziprasidone (GEODON) 40 MG capsule, Take 20 mg by mouth At Bedtime , Disp: , Rfl: 5     fluticasone (FLONASE) 50 MCG/ACT spray, Spray 1-2 sprays into both nostrils daily (Patient not taking: Reported on 3/13/2018), Disp: 16 g, Rfl: 11     cyclobenzaprine (FLEXERIL) 10 MG tablet, Take 0.5-1 tablets (5-10 mg) by mouth At Bedtime (Patient not taking: Reported on 8/1/2017), Disp: 30 tablet, Rfl: 0     permethrin 1 % LIQD, Apply to clean, towel-dried hair, saturate hair and scalp, wash off after 10 min. (Patient not taking: Reported on 9/15/2017), Disp: 60 mL, Rfl: 1  Immunization History   Administered Date(s) Administered     Influenza (IIV3) PF 12/06/2012     Influenza Vaccine IM 3yrs+ 4 Valent IIV4 11/21/2017     TDAP Vaccine (Adacel) 12/06/2012     Allergies   Allergen Reactions     Animal Dander      Nkda [No Known Drug Allergies]      Seasonal Allergies      Weeds and mold         EXAM:   Constitutional:  Appears well-developed and well-nourished. No distress.   HEENT:   Head: Normocephalic.   Mouth/Throat: No oropharyngeal exudate present.   No cobblestoning of posterior oropharynx.   Nasal tissue pink and normal appearing.  No rhinorrhea noted.    Eyes: Conjunctivae are non-erythematous   Cardiovascular: Normal rate, regular rhythm and normal heart sounds. Exam reveals no gallop and no friction rub.   No murmur heard.  Respiratory: Effort normal and breath sounds normal. No respiratory distress. No wheezes. No rales.   Musculoskeletal: Normal range of motion.   Neuro: Oriented to person, place, and time.  Skin: Skin is warm and dry. No rash noted.   Psychiatric: Normal mood and affect.     Nursing note and vitals  reviewed.      WORKUP:   Cluster immunotherapy   The patient received blue 0.2, blue 0.4, and yellow 0.05. Each dose was  by 30 minutes. Full report will be scanned into electronic medical record. The patient was in office for over 1.5 hours.    ASSESSMENT/PLAN:  Problem List Items Addressed This Visit        Respiratory    Seasonal allergic rhinitis due to pollen     History of nasal and ocular symptoms for multiple years. Perennial with spring and fall worsening. Tried cetirizine and loratadine which were helpful. Currently used Allegra as needed. Allergy testing done in 2013 positive for dust mite, cat, dog, molds, trees, grasses and weeds. Tolerated cluster immunotherapy visit 2 today.      Skin testing  Positive for cat, dog, dust mites, weeds, trees and molds.       - Flonase 2 spray/nostril daily. Use spring and fall at the very least.   - Allegra or Zyrtec as needed and on allergy shot days.   - Return to clinic in 1 week for cluster immunotherapy.          Relevant Orders    RAPID DESENSITIZATION (Completed)    Allergic rhinitis due to mold    Relevant Orders    RAPID DESENSITIZATION (Completed)    Allergic rhinitis due to animal dander - Primary    Relevant Orders    RAPID DESENSITIZATION (Completed)    Allergic rhinitis due to dust mite    Relevant Orders    RAPID DESENSITIZATION (Completed)          Chart documentation with Dragon Voice recognition Software. Although reviewed after completion, some words and grammatical errors may remain.    Magdy Blevins, DO   Allergy/Immunology  [facility] [place] and [place]      Again, thank you for allowing me to participate in the care of your patient.        Sincerely,        Magdy Blevins, DO
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
## 2018-03-26 ENCOUNTER — TRANSFERRED RECORDS (OUTPATIENT)
Dept: HEALTH INFORMATION MANAGEMENT | Facility: CLINIC | Age: 33
End: 2018-03-26

## 2018-03-27 ENCOUNTER — OFFICE VISIT (OUTPATIENT)
Dept: ALLERGY | Facility: OTHER | Age: 33
End: 2018-03-27
Payer: COMMERCIAL

## 2018-03-27 ENCOUNTER — TRANSFERRED RECORDS (OUTPATIENT)
Dept: HEALTH INFORMATION MANAGEMENT | Facility: CLINIC | Age: 33
End: 2018-03-27

## 2018-03-27 VITALS
DIASTOLIC BLOOD PRESSURE: 86 MMHG | HEART RATE: 109 BPM | BODY MASS INDEX: 32.38 KG/M2 | OXYGEN SATURATION: 96 % | TEMPERATURE: 98 F | WEIGHT: 192 LBS | SYSTOLIC BLOOD PRESSURE: 128 MMHG

## 2018-03-27 DIAGNOSIS — J30.1 CHRONIC SEASONAL ALLERGIC RHINITIS DUE TO POLLEN: ICD-10-CM

## 2018-03-27 DIAGNOSIS — J30.81 ALLERGIC RHINITIS DUE TO ANIMAL DANDER: Primary | ICD-10-CM

## 2018-03-27 DIAGNOSIS — J30.89 ALLERGIC RHINITIS DUE TO MOLD: ICD-10-CM

## 2018-03-27 DIAGNOSIS — J30.89 ALLERGIC RHINITIS DUE TO DUST MITE: ICD-10-CM

## 2018-03-27 LAB — PAP-ABSTRACT: NORMAL

## 2018-03-27 PROCEDURE — 95180 RAPID DESENSITIZATION: CPT | Performed by: ALLERGY & IMMUNOLOGY

## 2018-03-27 PROCEDURE — 99207 ZZC DROP WITH A PROCEDURE: CPT | Mod: 25 | Performed by: ALLERGY & IMMUNOLOGY

## 2018-03-27 NOTE — PROGRESS NOTES
Maddy Ortiz is a 32 year old  female with previous medical history significant for allergic rhinitis who returns for a follow up visit. Maddy Ortiz is being seen today for asthma and seasonal allergies.       Patient presents today for cluster immunotherapy. The patient is currently in a good state of health. No recent fevers, chills, cough, wheezing, shortness of breath, skin rash, angioedema, nausea, vomiting or diarrhea. The risks and benefits were discussed and the patient/patient's family wishes to proceed. The consent was signed.    Past Medical History:   Diagnosis Date     Bipolar 1 disorder (H) 12/6/2012     Paranoid type delusional disorder (H) 11/14/2012     Family History   Problem Relation Age of Onset     Adopted: Yes     Unknown/Adopted Mother      Unknown/Adopted Father      Unknown/Adopted Other      Past Surgical History:   Procedure Laterality Date     TONSILLECTOMY & ADENOIDECTOMY      as a child       REVIEW OF SYSTEMS:  General: negative for weight gain. negative for weight loss. negative for changes in sleep.   Ears: negative for fullness. negative for hearing loss. negative for dizziness.   Nose: negative for snoring.negative for changes in smell. negative for drainage.   Throat: negative for hoarseness. negative for sore throat. negative for trouble swallowing.   Lungs: negative for shortness of breath.negative for wheezing. negative for sputum production.   Cardiovascular: negative for chest pain. negative for swelling of ankles. negative for fast or irregular heartbeat.   Gastrointestinal: negative for nausea. negative for heartburn. negative for acid reflux.   Musculoskeletal: negative for joint pain. negative for joint stiffness. negative for joint swelling.   Neurologic: negative for seizures. negative for fainting. negative for weakness.   Psychiatric: negative for changes in mood. negative for anxiety.   Endocrine: negative for cold intolerance. negative for heat intolerance.  negative for tremors.   Hematologic: negative for easy bruising. negative for easy bleeding.  Integumentary: negative for rash. negative for scaling. negative for nail changes.       Current Outpatient Prescriptions:      ORDER FOR ALLERGEN IMMUNOTHERAPY, Cat Hair, Standardized 10,000 BAU/mL, ALK  3.0 ml Dog Hair Dander, A. P.  1:100 w/v, HS  1.0 ml Dust Mites F 30,000AU/mL, HS  0.5 ml Dust Mites P. 30,000 AU/mL, HS  0.5 ml  Diluent: HSA qs to 5ml, Disp: 5 mL, Rfl: PRN     ORDER FOR ALLERGEN IMMUNOTHERAPY, Alternaria Tenuis 1:10 w/v, HS  0.5 ml Epicoccum Nigrum 1:10 w/v, HS 0.5 ml Hormodendrum Cladosporioides 1:10 w/v, HS 0.5 ml Diluent: HSA qs to 5ml, Disp: 5 mL, Rfl: PRN     ORDER FOR ALLERGEN IMMUNOTHERAPY, Estuardo, White  1:20 w/v, HS  0.5 ml Birch Mix PRW 1:20 w/v, HS  0.5 ml Elm, American 1:20 w/v, HS  0.5 ml Hackberry 1:20 w/v, HS 0.5 ml Hickory, Shagbark 1:20 w/v, HS  0.5 ml [place] Mix RW 1:20 w/v, HS 0.5 ml Oak Mix RVW 1:20 w/v, HS 0.5 ml [place] Tree, Black 1:20 w/v, HS 0.5 ml [place], Black 1:20 w/v, HS 0.5 ml Diluent: HSA qs to 5ml, Disp: 5 mL, Rfl: PRN     ORDER FOR ALLERGEN IMMUNOTHERAPY, Kochia 1:20 w/v, HS 1.0 ml Nettle 1:20 w/v, HS 1.0 ml Plantain, English 1:20 w/v, HS 1.0 ml Ragweed Mixed 1:20 w/v ALK  0.6 ml Russian Thistle 1:20 w/v, HS 1.0 ml Diluent: HSA qs to 5ml, Disp: 5 mL, Rfl: PRN     albuterol (PROAIR HFA/PROVENTIL HFA/VENTOLIN HFA) 108 (90 BASE) MCG/ACT Inhaler, Inhale 2 puffs into the lungs every 4 hours as needed, Disp: 1 Inhaler, Rfl: 3     levonorgestrel (MIRENA) 20 MCG/24HR IUD, 1 each (20 mcg) by Intrauterine route continuous, Disp: , Rfl:      permethrin 1 % LIQD, Apply to clean, towel-dried hair, saturate hair and scalp, wash off after 10 min., Disp: 60 mL, Rfl: 1     cetirizine (ZYRTEC) 10 MG tablet, Take 1 tablet (10 mg) by mouth every evening, Disp: 30 tablet, Rfl: 11     DiphenhydrAMINE HCl (BENADRYL PO), Take 50 mg by mouth nightly as needed for allergies or sleep, Disp: , Rfl:       EPINEPHrine (EPIPEN 2-ODETTE) 0.3 MG/0.3ML injection, Inject 0.3 mLs (0.3 mg) into the muscle once as needed for anaphylaxis, Disp: 0.6 mL, Rfl: 3     ziprasidone (GEODON) 40 MG capsule, Take 20 mg by mouth At Bedtime , Disp: , Rfl: 5     fluticasone (FLONASE) 50 MCG/ACT spray, Spray 1-2 sprays into both nostrils daily (Patient not taking: Reported on 3/13/2018), Disp: 16 g, Rfl: 11     cyclobenzaprine (FLEXERIL) 10 MG tablet, Take 0.5-1 tablets (5-10 mg) by mouth At Bedtime (Patient not taking: Reported on 8/1/2017), Disp: 30 tablet, Rfl: 0  Immunization History   Administered Date(s) Administered     Influenza (IIV3) PF 12/06/2012     Influenza Vaccine IM 3yrs+ 4 Valent IIV4 11/21/2017     TDAP Vaccine (Adacel) 12/06/2012     Allergies   Allergen Reactions     Animal Dander      Nkda [No Known Drug Allergies]      Seasonal Allergies      Weeds and mold         EXAM:   Constitutional:  Appears well-developed and well-nourished. No distress.   HEENT:   Head: Normocephalic.   Mouth/Throat: No oropharyngeal exudate present.   No cobblestoning of posterior oropharynx.   Eyes: Conjunctivae are non-erythematous    Cardiovascular: Normal rate, regular rhythm and normal heart sounds. Exam reveals no gallop and no friction rub.   No murmur heard.  Respiratory: Effort normal and breath sounds normal. No respiratory distress. No wheezes. No rales.   Musculoskeletal: Normal range of motion.   Neuro: Oriented to person, place, and time.  Skin: Skin is warm and dry. No rash noted.   Psychiatric: Normal mood and affect.     Nursing note and vitals reviewed.      WORKUP:   Cluster immunotherapy   The patient received yellow 0.1, yellow 0.15, and yellow 0.25. Each dose was  by 30 minutes. Full report will be scanned into electronic medical record. The patient was in office for over 1.5 hours.    ASSESSMENT/PLAN:  Problem List Items Addressed This Visit        Respiratory    Seasonal allergic rhinitis due to pollen      History of nasal and ocular symptoms for multiple years. Perennial with spring and fall worsening. Tried cetirizine and loratadine which were helpful. Currently used Allegra as needed. Allergy testing done in 2013 positive for dust mite, cat, dog, molds, trees, grasses and weeds. Tolerated cluster immunotherapy today.      Skin testing  Positive for cat, dog, dust mites, weeds, trees and molds.       - Flonase 2 spray/nostril daily. Use spring and fall at the very least.   - Allegra or Zyrtec as needed and on allergy shot days.   - Return to clinic for cluster immunotherapy.          Relevant Orders    RAPID DESENSITIZATION    Allergic rhinitis due to mold    Relevant Orders    RAPID DESENSITIZATION    Allergic rhinitis due to animal dander - Primary    Relevant Orders    RAPID DESENSITIZATION (Completed)    Allergic rhinitis due to dust mite    Relevant Orders    RAPID DESENSITIZATION (Completed)          Chart documentation with Dragon Voice recognition Software. Although reviewed after completion, some words and grammatical errors may remain.    Magdy Blevins,    Allergy/Immunology  [facility]]-[place] and [place]

## 2018-03-27 NOTE — LETTER
3/27/2018         RE: Maddy Ortiz  [address]   [place] [id number]        Dear Colleague,    Thank you for referring your patient, Maddy Ortiz, to the [facility]. Please see a copy of my visit note below.    Maddy Ortiz is a 32 year old  female with previous medical history significant for allergic rhinitis who returns for a follow up visit. Maddy Ortiz is being seen today for asthma and seasonal allergies.       Patient presents today for cluster immunotherapy. The patient is currently in a good state of health. No recent fevers, chills, cough, wheezing, shortness of breath, skin rash, angioedema, nausea, vomiting or diarrhea. The risks and benefits were discussed and the patient/patient's family wishes to proceed. The consent was signed.    Past Medical History:   Diagnosis Date     Bipolar 1 disorder (H) 12/6/2012     Paranoid type delusional disorder (H) 11/14/2012     Family History   Problem Relation Age of Onset     Adopted: Yes     Unknown/Adopted Mother      Unknown/Adopted Father      Unknown/Adopted Other      Past Surgical History:   Procedure Laterality Date     TONSILLECTOMY & ADENOIDECTOMY      as a child       REVIEW OF SYSTEMS:  General: negative for weight gain. negative for weight loss. negative for changes in sleep.   Ears: negative for fullness. negative for hearing loss. negative for dizziness.   Nose: negative for snoring.negative for changes in smell. negative for drainage.   Throat: negative for hoarseness. negative for sore throat. negative for trouble swallowing.   Lungs: negative for shortness of breath.negative for wheezing. negative for sputum production.   Cardiovascular: negative for chest pain. negative for swelling of ankles. negative for fast or irregular heartbeat.   Gastrointestinal: negative for nausea. negative for heartburn. negative for acid reflux.   Musculoskeletal: negative for joint pain. negative for joint stiffness.  negative for joint swelling.   Neurologic: negative for seizures. negative for fainting. negative for weakness.   Psychiatric: negative for changes in mood. negative for anxiety.   Endocrine: negative for cold intolerance. negative for heat intolerance. negative for tremors.   Hematologic: negative for easy bruising. negative for easy bleeding.  Integumentary: negative for rash. negative for scaling. negative for nail changes.       Current Outpatient Prescriptions:      ORDER FOR ALLERGEN IMMUNOTHERAPY, Cat Hair, Standardized 10,000 BAU/mL, ALK  3.0 ml Dog Hair Dander, A. P.  1:100 w/v, HS  1.0 ml Dust Mites F 30,000AU/mL, HS  0.5 ml Dust Mites P. 30,000 AU/mL, HS  0.5 ml  Diluent: HSA qs to 5ml, Disp: 5 mL, Rfl: PRN     ORDER FOR ALLERGEN IMMUNOTHERAPY, Alternaria Tenuis 1:10 w/v, HS  0.5 ml Epicoccum Nigrum 1:10 w/v, HS 0.5 ml Hormodendrum Cladosporioides 1:10 w/v, HS 0.5 ml Diluent: HSA qs to 5ml, Disp: 5 mL, Rfl: PRN     ORDER FOR ALLERGEN IMMUNOTHERAPY, Estuardo, White  1:20 w/v, HS  0.5 ml Birch Mix PRW 1:20 w/v, HS  0.5 ml Elm, American 1:20 w/v, HS  0.5 ml Hackberry 1:20 w/v, HS 0.5 ml Hickory, Shagbark 1:20 w/v, HS  0.5 ml Lottie Mix RW 1:20 w/v, HS 0.5 ml Oak Mix RVW 1:20 w/v, HS 0.5 ml [place] Tree, Black 1:20 w/v, HS 0.5 ml [place], Black 1:20 w/v, HS 0.5 ml Diluent: HSA qs to 5ml, Disp: 5 mL, Rfl: PRN     ORDER FOR ALLERGEN IMMUNOTHERAPY, Kochia 1:20 w/v, HS 1.0 ml Nettle 1:20 w/v, HS 1.0 ml Plantain, English 1:20 w/v, HS 1.0 ml Ragweed Mixed 1:20 w/v ALK  0.6 ml Russian Thistle 1:20 w/v, HS 1.0 ml Diluent: HSA qs to 5ml, Disp: 5 mL, Rfl: PRN     albuterol (PROAIR HFA/PROVENTIL HFA/VENTOLIN HFA) 108 (90 BASE) MCG/ACT Inhaler, Inhale 2 puffs into the lungs every 4 hours as needed, Disp: 1 Inhaler, Rfl: 3     levonorgestrel (MIRENA) 20 MCG/24HR IUD, 1 each (20 mcg) by Intrauterine route continuous, Disp: , Rfl:      permethrin 1 % LIQD, Apply to clean, towel-dried hair, saturate hair and scalp, wash off after  10 min., Disp: 60 mL, Rfl: 1     cetirizine (ZYRTEC) 10 MG tablet, Take 1 tablet (10 mg) by mouth every evening, Disp: 30 tablet, Rfl: 11     DiphenhydrAMINE HCl (BENADRYL PO), Take 50 mg by mouth nightly as needed for allergies or sleep, Disp: , Rfl:      EPINEPHrine (EPIPEN 2-ODETTE) 0.3 MG/0.3ML injection, Inject 0.3 mLs (0.3 mg) into the muscle once as needed for anaphylaxis, Disp: 0.6 mL, Rfl: 3     ziprasidone (GEODON) 40 MG capsule, Take 20 mg by mouth At Bedtime , Disp: , Rfl: 5     fluticasone (FLONASE) 50 MCG/ACT spray, Spray 1-2 sprays into both nostrils daily (Patient not taking: Reported on 3/13/2018), Disp: 16 g, Rfl: 11     cyclobenzaprine (FLEXERIL) 10 MG tablet, Take 0.5-1 tablets (5-10 mg) by mouth At Bedtime (Patient not taking: Reported on 8/1/2017), Disp: 30 tablet, Rfl: 0  Immunization History   Administered Date(s) Administered     Influenza (IIV3) PF 12/06/2012     Influenza Vaccine IM 3yrs+ 4 Valent IIV4 11/21/2017     TDAP Vaccine (Adacel) 12/06/2012     Allergies   Allergen Reactions     Animal Dander      Nkda [No Known Drug Allergies]      Seasonal Allergies      Weeds and mold         EXAM:   Constitutional:  Appears well-developed and well-nourished. No distress.   HEENT:   Head: Normocephalic.   Mouth/Throat: No oropharyngeal exudate present.   No cobblestoning of posterior oropharynx.   Eyes: Conjunctivae are non-erythematous    Cardiovascular: Normal rate, regular rhythm and normal heart sounds. Exam reveals no gallop and no friction rub.   No murmur heard.  Respiratory: Effort normal and breath sounds normal. No respiratory distress. No wheezes. No rales.   Musculoskeletal: Normal range of motion.   Neuro: Oriented to person, place, and time.  Skin: Skin is warm and dry. No rash noted.   Psychiatric: Normal mood and affect.     Nursing note and vitals reviewed.      WORKUP:   Cluster immunotherapy   The patient received yellow 0.1, yellow 0.15, and yellow 0.25. Each dose was   by 30 minutes. Full report will be scanned into electronic medical record. The patient was in office for over 1.5 hours.    ASSESSMENT/PLAN:  Problem List Items Addressed This Visit        Respiratory    Seasonal allergic rhinitis due to pollen     History of nasal and ocular symptoms for multiple years. Perennial with spring and fall worsening. Tried cetirizine and loratadine which were helpful. Currently used Allegra as needed. Allergy testing done in 2013 positive for dust mite, cat, dog, molds, trees, grasses and weeds. Tolerated cluster immunotherapy today.      Skin testing  Positive for cat, dog, dust mites, weeds, trees and molds.       - Flonase 2 spray/nostril daily. Use spring and fall at the very least.   - Allegra or Zyrtec as needed and on allergy shot days.   - Return to clinic for cluster immunotherapy.          Relevant Orders    RAPID DESENSITIZATION    Allergic rhinitis due to mold    Relevant Orders    RAPID DESENSITIZATION    Allergic rhinitis due to animal dander - Primary    Relevant Orders    RAPID DESENSITIZATION (Completed)    Allergic rhinitis due to dust mite    Relevant Orders    RAPID DESENSITIZATION (Completed)          Chart documentation with Dragon Voice recognition Software. Although reviewed after completion, some words and grammatical errors may remain.    Magdy Blevins,    Allergy/Immunology  [facility], [place] and [place]      Again, thank you for allowing me to participate in the care of your patient.        Sincerely,        Magdy Blevins, DO

## 2018-03-27 NOTE — ASSESSMENT & PLAN NOTE
History of nasal and ocular symptoms for multiple years. Perennial with spring and fall worsening. Tried cetirizine and loratadine which were helpful. Currently used Allegra as needed. Allergy testing done in 2013 positive for dust mite, cat, dog, molds, trees, grasses and weeds. Tolerated cluster immunotherapy today.      Skin testing  Positive for cat, dog, dust mites, weeds, trees and molds.       - Flonase 2 spray/nostril daily. Use spring and fall at the very least.   - Allegra or Zyrtec as needed and on allergy shot days.   - Return to clinic for cluster immunotherapy.

## 2018-03-27 NOTE — MR AVS SNAPSHOT
After Visit Summary   3/27/2018    Maddy Ortiz    MRN: [id number]           Patient Information     Date Of Birth          1985        Visit Information        Provider Department      3/27/2018 2:20 PM Magdy Blevins DO [facility]        Today's Diagnoses     Allergic rhinitis due to animal dander    -  1    Allergic rhinitis due to dust mite        Allergic rhinitis due to mold        Chronic seasonal allergic rhinitis due to pollen          Care Instructions    Allergy Staff Appt Hours Shot Hours Locations    Physician     Magdy Blevins DO       Support Staff     MARY ANNE Meneses, [facility]  Monday:                      [place] 8-7     Tuesday:         [place] 8-5     Wednesday:        [place]: 7-5     Friday:        [place] 7-5   [place]        Monday: 9-5:50        Wednesday: 2-5:50        Friday: 7-12:50     [place]        Tuesday: 7-10:50        Thursday: 1:30-6:30     [place] Monday: 7:10-4:50        Tuesday: 12:30-6:30        Thursday: 7-11:50 [facility]  [address] [place] [address]  Appt Line: [phone]  Allergy RN (Monday):  [phone]    [facility]  [address] [place] [address]  Appt Line: [phone]  Allergy RN (Tues & Wed):  [phone]    [facility]  [address] [place] [address]  Appt Line: [phone]  Allergy RN (Friday):  [phone]       Important Scheduling Information  Aspirin Desensitization: Appt will last 2 clinic days. Please call the Allergy RN line for your clinic to schedule. Discontinue antihistamines 7 days prior to the appointment.     Food Challenges: Appt will last 3-4 hours. Please call the Allergy RN line for your clinic to schedule. Discontinue antihistamines 7 days prior to the appointment.     Penicillin Testing: Appt will last 2-3 hours. Please call the Allergy RN line for your clinic to schedule. Discontinue antihistamines 7 days prior to the  appointment.     Skin Testing: Appt will about 40 minutes. Call the appointment line for your clinic to schedule. Discontinue antihistamines 7 days prior to the appointment.     Venom Testing: Appt will last 2-3 hours. Please call the Allergy RN line for your clinic to schedule. Discontinue antihistamines 7 days prior to the appointment.     Thank you for trusting us with your Allergy, Asthma, and Immunology care. Please feel free to contact us with any questions or concerns you may have.                Follow-ups after your visit        Your next 10 appointments already scheduled     Apr 05, 2018  1:30 PM CDT   Nurse Only with ER ALLERGY SHOTS   [facility]    [address] [place] [address]   [phone]            Apr 10, 2018  1:00 PM CDT   Return Visit with Magdy Blevins DO   [facility]    [address] [place] [address]   [phone]            Apr 12, 2018  3:00 PM CDT   Office Visit with Radha Nguyen MD   [facility]    [address] [facility] [facility] [address]   [phone]           Bring a current list of meds and any records pertaining to this visit. For Physicals, please bring immunization records and any forms needing to be filled out. Please arrive 10 minutes early to complete paperwork.            Apr 17, 2018  1:00 PM CDT   Return Visit with Magdy Blevins DO   [facility]    [address] [place] [address]   [phone]            Apr 24, 2018  1:00 PM CDT   Return Visit with Magdy Blevins DO   [facility]    [address] [place] [address]   [phone]            May 01, 2018  1:00 PM CDT   Return Visit with Magdy Blevins DO   [facility] ([place]  [facility])    [address]  [facility] [address] [phone]              Who to contact     If you have questions or need follow up information about today's clinic visit or your schedule please contact [facility] directly at [phone].  Normal or non-critical lab and imaging results will be communicated to you by MyChart, letter or phone within 4 business days after the clinic has received the results. If you do not hear from us within 7 days, please contact the clinic through MyChart or phone. If you have a critical or abnormal lab result, we will notify you by phone as soon as possible.  Submit refill requests through [employer] or call your pharmacy and they will forward the refill request to us. Please allow 3 business days for your refill to be completed.          Additional Information About Your Visit        MyChart Information     [employer] lets you send messages to your doctor, view your test results, renew your prescriptions, schedule appointments and more. To sign up, go to [email or web address] . Click on \"Log in\" on the left side of the screen, which will take you to the Welcome page. Then click on \"Sign up Now\" on the right side of the page.     You will be asked to enter the access code listed below, as well as some personal information. Please follow the directions to create your username and password.     Your access code is: [email or web address]  Expires: 2018  3:10 PM     Your access code will  in 90 days. If you need help or a new code, please call your [place] clinic or [phone].        Care EveryWhere ID     This is your Care EveryWhere ID. This could be used by other organizations to access your [place] medical records  [id number]        Your Vitals Were     Pulse Temperature Pulse Oximetry BMI (Body Mass Index)          109 98  F (36.7  C) (Oral) 96% 32.38 kg/m2         Blood Pressure from Last 3 Encounters:   18 128/86 "   03/20/18 (!) 155/109   03/20/18 128/82    Weight from Last 3 Encounters:   03/27/18 87.1 kg (192 lb)   03/20/18 88 kg (194 lb)   03/13/18 87.3 kg (192 lb 8 oz)              We Performed the Following     RAPID DESENSITIZATION        Primary Care Provider Office Phone # Fax #    [facility] [phone] [address] [facility] [address]        Equal Access to Services     ARMOND MCKEON : Hadii aad ku hadasho Soomaali, waaxda luqadaha, qaybta kaalmada adeegyada, waxay coltenin haygermania maciel . So [facility] [phone].    ATENCIÓN: Si habla español, tiene a titus disposición servicios gratuitos de asistencia lingüística. Llame al [phone].    We comply with applicable federal civil rights laws and [place] laws. We do not discriminate on the basis of race, color, national origin, age, disability, sex, sexual orientation, or gender identity.            Thank you!     Thank you for choosing [facility]  for your care. Our goal is always to provide you with excellent care. Hearing back from our patients is one way we can continue to improve our services. Please take a few minutes to complete the written survey that you may receive in the mail after your visit with us. Thank you!             Your Updated Medication List - Protect others around you: Learn how to safely use, store and throw away your medicines at www.disposemymeds.org.          This list is accurate as of 3/27/18  4:52 PM.  Always use your most recent med list.                   Brand Name Dispense Instructions for use Diagnosis    albuterol 108 (90 BASE) MCG/ACT Inhaler    PROAIR HFA/PROVENTIL HFA/VENTOLIN HFA    1 Inhaler    Inhale 2 puffs into the lungs every 4 hours as needed    SOB (shortness of breath)       * ALLERGEN IMMUNOTHERAPY PRESCRIPTION     5 mL    Cat Hair, Standardized 10,000 BAU/mL, ALK  3.0 ml Dog Hair Dander, A. P.  1:100 w/v, HS  1.0 ml Dust Mites F 30,000AU/mL, HS  0.5 ml  Dust Mites P. 30,000 AU/mL, HS  0.5 ml  Diluent: HSA qs to 5ml    Allergic rhinitis due to animal dander, Allergic rhinitis due to dust mite       * ALLERGEN IMMUNOTHERAPY PRESCRIPTION     5 mL    Alternaria Tenuis 1:10 w/v, HS  0.5 ml Epicoccum Nigrum 1:10 w/v, HS 0.5 ml Hormodendrum Cladosporioides 1:10 w/v, HS 0.5 ml Diluent: HSA qs to 5ml    Allergic rhinitis due to mold       * ALLERGEN IMMUNOTHERAPY PRESCRIPTION     5 mL    Estuardo, White  1:20 w/v, HS  0.5 ml Birch Mix PRW 1:20 w/v, HS  0.5 ml Elm, American 1:20 w/v, HS  0.5 ml Hackberry 1:20 w/v, HS 0.5 ml Hickory, Shagbark 1:20 w/v, HS  0.5 ml Myton Mix RW 1:20 w/v, HS 0.5 ml Oak Mix RVW 1:20 w/v, HS 0.5 ml [place] Tree, Black 1:20 w/v, HS 0.5 ml [place], Black 1:20 w/v, HS 0.5 ml Diluent: HSA qs to 5ml    Chronic seasonal allergic rhinitis due to pollen       * ALLERGEN IMMUNOTHERAPY PRESCRIPTION     5 mL    Kochia 1:20 w/v, HS 1.0 ml Nettle 1:20 w/v, HS 1.0 ml Plantain, English 1:20 w/v, HS 1.0 ml Ragweed Mixed 1:20 w/v ALK  0.6 ml Russian Thistle 1:20 w/v, HS 1.0 ml Diluent: HSA qs to 5ml    Chronic seasonal allergic rhinitis due to pollen       BENADRYL PO      Take 50 mg by mouth nightly as needed for allergies or sleep        cetirizine 10 MG tablet    zyrTEC    30 tablet    Take 1 tablet (10 mg) by mouth every evening    Chronic seasonal allergic rhinitis due to pollen       cyclobenzaprine 10 MG tablet    FLEXERIL    30 tablet    Take 0.5-1 tablets (5-10 mg) by mouth At Bedtime    Sacral back pain       EPINEPHrine 0.3 MG/0.3ML injection 2-pack    EPIPEN 2-ODETTE    0.6 mL    Inject 0.3 mLs (0.3 mg) into the muscle once as needed for anaphylaxis    Need for desensitization to allergens       fluticasone 50 MCG/ACT spray    FLONASE    16 g    Spray 1-2 sprays into both nostrils daily    Allergic rhinitis due to animal dander, Allergic rhinitis due to dust mite, Allergic rhinitis due to mold, Chronic seasonal allergic rhinitis due to pollen        levonorgestrel 20 MCG/24HR IUD    MIRENA     1 each (20 mcg) by Intrauterine route continuous    Encounter for insertion of mirena IUD       permethrin 1 % Liqd     60 mL    Apply to clean, towel-dried hair, saturate hair and scalp, wash off after 10 min.    Lice       ziprasidone 40 MG capsule    GEODON     Take 20 mg by mouth At Bedtime        * Notice:  This list has 4 medication(s) that are the same as other medications prescribed for you. Read the directions carefully, and ask your doctor or other care provider to review them with you.

## 2018-03-27 NOTE — PATIENT INSTRUCTIONS
Allergy Staff Appt Hours Shot Hours Locations    Physician     Magdy Blevins, DO       Support Staff     Nessa SIERRA RN      Neeru SIERRA, [facility]  Monday:                      [place] 8-7     Tuesday:         [place] 8-5     Wednesday:        [place]: 7-5     Friday:        [place] 7-5   [place]        Monday: 9-5:50        Wednesday: 2-5:50        Friday: 7-12:50     [place]        Tuesday: 7-10:50        Thursday: 1:30-6:30     [place] Monday: 7:10-4:50        Tuesday: 12:30-6:30        Thursday: 7-11:50 [facility]  [address] [place] [address]  Appt Line: [phone]  Allergy RN (Monday):  [phone]    [facility]  [address] [place] [address]  Appt Line: [phone]  Allergy RN (Tues & Wed):  [phone]    [facility]  [address] [place] [address]  Appt Line: [phone]  Allergy RN (Friday):  [phone]       Important Scheduling Information  Aspirin Desensitization: Appt will last 2 clinic days. Please call the Allergy RN line for your clinic to schedule. Discontinue antihistamines 7 days prior to the appointment.     Food Challenges: Appt will last 3-4 hours. Please call the Allergy RN line for your clinic to schedule. Discontinue antihistamines 7 days prior to the appointment.     Penicillin Testing: Appt will last 2-3 hours. Please call the Allergy RN line for your clinic to schedule. Discontinue antihistamines 7 days prior to the appointment.     Skin Testing: Appt will about 40 minutes. Call the appointment line for your clinic to schedule. Discontinue antihistamines 7 days prior to the appointment.     Venom Testing: Appt will last 2-3 hours. Please call the Allergy RN line for your clinic to schedule. Discontinue antihistamines 7 days prior to the appointment.     Thank you for trusting us with your Allergy, Asthma, and Immunology care. Please feel free to contact us with any questions or concerns you may have.

## 2018-04-04 ENCOUNTER — ALLIED HEALTH/NURSE VISIT (OUTPATIENT)
Dept: FAMILY MEDICINE | Facility: CLINIC | Age: 33
End: 2018-04-04
Payer: COMMERCIAL

## 2018-04-04 DIAGNOSIS — F31.9 BIPOLAR 1 DISORDER (H): Primary | ICD-10-CM

## 2018-04-04 PROCEDURE — 99207 ZZC NO CHARGE NURSE ONLY: CPT

## 2018-04-04 NOTE — MR AVS SNAPSHOT
After Visit Summary   4/4/2018    Maddy Ortiz    MRN: [id number]           Patient Information     Date Of Birth          1985        Visit Information        Provider Department      4/4/2018 11:00 AM [place] [facility]        Today's Diagnoses     Bipolar 1 disorder (H)    -  1       Follow-ups after your visit        Your next 10 appointments already scheduled     Apr 05, 2018  8:40 AM CDT   SHORT with Deni Fernandez MD   [facility] ([facility])    [address] [address]   [phone]            Apr 05, 2018  1:30 PM CDT   Nurse Only with ER ALLERGY SHOTS   [facility] ([facility])    [address] [place] [address]   [phone]            Apr 10, 2018  1:00 PM CDT   Return Visit with Magdy Blevins DO   [facility] ([facility])    [address] [place] [address]   [phone]            Apr 12, 2018  3:00 PM CDT   Office Visit with Radha Nguyen MD   [facility] ([facility])    [address] [facility] [address]   [phone]           Bring a current list of meds and any records pertaining to this visit. For Physicals, please bring immunization records and any forms needing to be filled out. Please arrive 10 minutes early to complete paperwork.            Apr 17, 2018  1:00 PM CDT   Return Visit with Magdy Blevins DO   [facility] ([facility])    [address]  [facility] [address]   [phone]            Apr 24, 2018  1:00 PM CDT   Return Visit with Magdy Blevins DO   [facility] ([facility])    [address] [place] [address]   [phone]            May 01, 2018  1:00 PM CDT   Return Visit with Magdy Blevins DO   [facility]  [facility]    [address]  [facility] [address] [phone]              Who to contact     If you have questions or need follow up information about today's clinic visit or your schedule please contact [facility] directly at [phone].  Normal or non-critical lab and imaging results will be communicated to you by MyChart, letter or phone within 4 business days after the clinic has received the results. If you do not hear from us within 7 days, please contact the clinic through MyChart or phone. If you have a critical or abnormal lab result, we will notify you by phone as soon as possible.  Submit refill requests through [employer] or call your pharmacy and they will forward the refill request to us. Please allow 3 business days for your refill to be completed.          Additional Information About Your Visit        [employer] Information     [employer] lets you send messages to your doctor, view your test results, renew your prescriptions, schedule appointments and more. To sign up, go to [email or web address] . Click on \"Log in\" on the left side of the screen, which will take you to the Welcome page. Then click on \"Sign up Now\" on the right side of the page.     You will be asked to enter the access code listed below, as well as some personal information. Please follow the directions to create your username and password.     Your access code is: [email or web address]  Expires: 2018  3:10 PM     Your access code will  in 90 days. If you need help or a new code, please call your [place] clinic or [phone].        Care EveryWhere ID     This is your Care EveryWhere ID. This could be used by other organizations to access your [place] medical records  [id number]         Blood Pressure from Last 3 Encounters:   18 128/86   18 (!) 155/109   18 128/82    Weight from Last 3 Encounters:   18 192 lb (87.1 kg)   18 194 lb (88 " kg)   03/13/18 192 lb 8 oz (87.3 kg)              Today, you had the following     No orders found for display       Primary Care Provider Office Phone # Fax #    [facility] [phone] [address] [facility] [address]        Equal Access to Services     ARMOND MCKEON : Hadii aad ku hadasho Soomaali, waaxda luqadaha, qaybta kaalmada adeegyada, waxay idiin hayaan adeeg dony laDorisaan . So [facility] [phone].    ATENCIÓN: Si habla español, tiene a titus disposición servicios gratuitos de asistencia lingüística. [place] [phone].    We comply with applicable federal civil rights laws and [place] laws. We do not discriminate on the basis of race, color, national origin, age, disability, sex, sexual orientation, or gender identity.            Thank you!     Thank you for choosing [facility]  for your care. Our goal is always to provide you with excellent care. Hearing back from our patients is one way we can continue to improve our services. Please take a few minutes to complete the written survey that you may receive in the mail after your visit with us. Thank you!             Your Updated Medication List - Protect others around you: Learn how to safely use, store and throw away your medicines at www.disposemymeds.org.          This list is accurate as of 4/4/18 11:07 AM.  Always use your most recent med list.                   Brand Name Dispense Instructions for use Diagnosis    albuterol 108 (90 BASE) MCG/ACT Inhaler    PROAIR HFA/PROVENTIL HFA/VENTOLIN HFA    1 Inhaler    Inhale 2 puffs into the lungs every 4 hours as needed    SOB (shortness of breath)       * ALLERGEN IMMUNOTHERAPY PRESCRIPTION     5 mL    Cat Hair, Standardized 10,000 BAU/mL, ALK  3.0 ml Dog Hair Dander, A. P.  1:100 w/v, HS  1.0 ml Dust Mites F 30,000AU/mL, HS  0.5 ml Dust Mites P. 30,000 AU/mL, HS  0.5 ml  Diluent: HSA qs to 5ml    Allergic rhinitis due to animal dander, Allergic  rhinitis due to dust mite       * ALLERGEN IMMUNOTHERAPY PRESCRIPTION     5 mL    Alternaria Tenuis 1:10 w/v, HS  0.5 ml Epicoccum Nigrum 1:10 w/v, HS 0.5 ml Hormodendrum Cladosporioides 1:10 w/v, HS 0.5 ml Diluent: HSA qs to 5ml    Allergic rhinitis due to mold       * ALLERGEN IMMUNOTHERAPY PRESCRIPTION     5 mL    Estuardo, White  1:20 w/v, HS  0.5 ml Birch Mix PRW 1:20 w/v, HS  0.5 ml Elm, American 1:20 w/v, HS  0.5 ml Hackberry 1:20 w/v, HS 0.5 ml Hickory, Shagbark 1:20 w/v, HS  0.5 ml [place]asabe Mix RW 1:20 w/v, HS 0.5 ml Oak Mix RVW 1:20 w/v, HS 0.5 ml [place] Tree, Black 1:20 w/v, HS 0.5 ml [place], Black 1:20 w/v, HS 0.5 ml Diluent: HSA qs to 5ml    Chronic seasonal allergic rhinitis due to pollen       * ALLERGEN IMMUNOTHERAPY PRESCRIPTION     5 mL    Kochia 1:20 w/v, HS 1.0 ml Nettle 1:20 w/v, HS 1.0 ml Plantain, English 1:20 w/v, HS 1.0 ml Ragweed Mixed 1:20 w/v ALK  0.6 ml Russian Thistle 1:20 w/v, HS 1.0 ml Diluent: HSA qs to 5ml    Chronic seasonal allergic rhinitis due to pollen       BENADRYL PO      Take 50 mg by mouth nightly as needed for allergies or sleep        cetirizine 10 MG tablet    zyrTEC    30 tablet    Take 1 tablet (10 mg) by mouth every evening    Chronic seasonal allergic rhinitis due to pollen       cyclobenzaprine 10 MG tablet    FLEXERIL    30 tablet    Take 0.5-1 tablets (5-10 mg) by mouth At Bedtime    Sacral back pain       EPINEPHrine 0.3 MG/0.3ML injection 2-pack    EPIPEN 2-ODETTE    0.6 mL    Inject 0.3 mLs (0.3 mg) into the muscle once as needed for anaphylaxis    Need for desensitization to allergens       fluticasone 50 MCG/ACT spray    FLONASE    16 g    Spray 1-2 sprays into both nostrils daily    Allergic rhinitis due to animal dander, Allergic rhinitis due to dust mite, Allergic rhinitis due to mold, Chronic seasonal allergic rhinitis due to pollen       levonorgestrel 20 MCG/24HR IUD    MIRENA     1 each (20 mcg) by Intrauterine route continuous    Encounter for insertion of  mirena IUD       permethrin 1 % Liqd     60 mL    Apply to clean, towel-dried hair, saturate hair and scalp, wash off after 10 min.    Lice       ziprasidone 40 MG capsule    GEODON     Take 20 mg by mouth At Bedtime        * Notice:  This list has 4 medication(s) that are the same as other medications prescribed for you. Read the directions carefully, and ask your doctor or other care provider to review them with you.

## 2018-04-05 ENCOUNTER — OFFICE VISIT (OUTPATIENT)
Dept: FAMILY MEDICINE | Facility: CLINIC | Age: 33
End: 2018-04-05
Payer: COMMERCIAL

## 2018-04-05 VITALS
DIASTOLIC BLOOD PRESSURE: 89 MMHG | WEIGHT: 191 LBS | HEART RATE: 94 BPM | HEIGHT: 65 IN | BODY MASS INDEX: 31.82 KG/M2 | SYSTOLIC BLOOD PRESSURE: 126 MMHG | TEMPERATURE: 97.7 F

## 2018-04-05 DIAGNOSIS — Z31.89 ENCOUNTER FOR FERTILITY PLANNING: Primary | ICD-10-CM

## 2018-04-05 DIAGNOSIS — F31.9 BIPOLAR I DISORDER (H): ICD-10-CM

## 2018-04-05 DIAGNOSIS — F22 PARANOID DISORDER (H): ICD-10-CM

## 2018-04-05 PROCEDURE — 99214 OFFICE O/P EST MOD 30 MIN: CPT | Performed by: FAMILY MEDICINE

## 2018-04-05 NOTE — PROGRESS NOTES
"  SUBJECTIVE:   Maddy Otriz is a 32 year old female who presents to clinic today for the following health issues:      Chief Complaint   Patient presents with     Referral     for OBGYN would like to talk about pregn      Referral     for Psych        32 yr old female with vast mental history including Bipolar disorder and paranoid disorder. She is here requesting a referral to talk to an OB provider about starting a family . She denies that she has any infertility issues but says she will like to talk to a specialist before starting to try . She is here with her partner.     Other requests today is that she will like to switch mental health provider. She is presently seeing a psychiatrist at [facility] . She says she \"senses\" that her psychiatrist does not wish to see her anymore. No acute symptoms at this time.     Problem list and histories reviewed & adjusted, as indicated.  Additional history: as documented    Patient Active Problem List   Diagnosis     Animal dander allergy     CARDIOVASCULAR SCREENING; LDL GOAL LESS THAN 160     Psychosis     Paranoid type delusional disorder (H)     Bipolar 1 disorder (H)     Insomnia     Anxiety     Health Care Home     Orthostatic hypotension     Depression with anxiety     Seasonal allergic rhinitis due to pollen     Allergic rhinitis due to mold     Allergic rhinitis due to animal dander     Allergic rhinitis due to dust mite     Encounter for IUD insertion     SOB (shortness of breath)     Past Surgical History:   Procedure Laterality Date     TONSILLECTOMY & ADENOIDECTOMY      as a child       Social History   Substance Use Topics     Smoking status: Never Smoker     Smokeless tobacco: Never Used     Alcohol use Yes      Comment: rare wine ( very rare)     Family History   Problem Relation Age of Onset     Adopted: Yes     Unknown/Adopted Mother      Unknown/Adopted Father      Unknown/Adopted Other          Current Outpatient Prescriptions   Medication " Sig Dispense Refill     cetirizine (ZYRTEC) 10 MG tablet Take 1 tablet (10 mg) by mouth every evening 30 tablet 11     DiphenhydrAMINE HCl (BENADRYL PO) Take 50 mg by mouth nightly as needed for allergies or sleep       ziprasidone (GEODON) 40 MG capsule Take 20 mg by mouth At Bedtime   5     ORDER FOR ALLERGEN IMMUNOTHERAPY Cat Hair, Standardized 10,000 BAU/mL, ALK  3.0 ml  Dog Hair Dander, A. P.  1:100 w/v, HS  1.0 ml  Dust Mites F 30,000AU/mL, HS  0.5 ml  Dust Mites P. 30,000 AU/mL, HS  0.5 ml   Diluent: HSA qs to 5ml 5 mL PRN     ORDER FOR ALLERGEN IMMUNOTHERAPY Alternaria Tenuis 1:10 w/v, HS  0.5 ml  Epicoccum Nigrum 1:10 w/v, HS 0.5 ml  Hormodendrum Cladosporioides 1:10 w/v, HS 0.5 ml  Diluent: HSA qs to 5ml 5 mL PRN     ORDER FOR ALLERGEN IMMUNOTHERAPY Estuardo, White  1:20 w/v, HS  0.5 ml  Birch Mix PRW 1:20 w/v, HS  0.5 ml  Elm, American 1:20 w/v, HS  0.5 ml  Hackberry 1:20 w/v, HS 0.5 ml  Hickory, Shagbark 1:20 w/v, HS  0.5 ml  [place] Mix RW 1:20 w/v, HS 0.5 ml  Oak Mix RVW 1:20 w/v, HS 0.5 ml  [place] Tree, Black 1:20 w/v, HS 0.5 ml  Stevenson, Black 1:20 w/v, HS 0.5 ml  Diluent: HSA qs to 5ml 5 mL PRN     ORDER FOR ALLERGEN IMMUNOTHERAPY Kochia 1:20 w/v, HS 1.0 ml  Nettle 1:20 w/v, HS 1.0 ml  Plantain, English 1:20 w/v, HS 1.0 ml  Ragweed Mixed 1:20 w/v ALK  0.6 ml  Russian Thistle 1:20 w/v, HS 1.0 ml  Diluent: HSA qs to 5ml 5 mL PRN     albuterol (PROAIR HFA/PROVENTIL HFA/VENTOLIN HFA) 108 (90 BASE) MCG/ACT Inhaler Inhale 2 puffs into the lungs every 4 hours as needed (Patient not taking: Reported on 4/5/2018) 1 Inhaler 3     fluticasone (FLONASE) 50 MCG/ACT spray Spray 1-2 sprays into both nostrils daily (Patient not taking: Reported on 3/13/2018) 16 g 11     levonorgestrel (MIRENA) 20 MCG/24HR IUD 1 each (20 mcg) by Intrauterine route continuous (Patient not taking: Reported on 4/5/2018)       cyclobenzaprine (FLEXERIL) 10 MG tablet Take 0.5-1 tablets (5-10 mg) by mouth At Bedtime (Patient not taking:  "Reported on 8/1/2017) 30 tablet 0     permethrin 1 % LIQD Apply to clean, towel-dried hair, saturate hair and scalp, wash off after 10 min. (Patient not taking: Reported on 4/5/2018) 60 mL 1     EPINEPHrine (EPIPEN 2-ODETTE) 0.3 MG/0.3ML injection Inject 0.3 mLs (0.3 mg) into the muscle once as needed for anaphylaxis 0.6 mL 3     Allergies   Allergen Reactions     Animal Dander      Nkda [No Known Drug Allergies]      Seasonal Allergies      Weeds and mold     BP Readings from Last 3 Encounters:   04/05/18 126/89   03/27/18 128/86   03/20/18 (!) 155/109    Wt Readings from Last 3 Encounters:   04/05/18 191 lb (86.6 kg)   03/27/18 192 lb (87.1 kg)   03/20/18 194 lb (88 kg)                  Labs reviewed in EPIC    Reviewed and updated as needed this visit by clinical staff  Tobacco  Allergies  Med Hx  Surg Hx  Fam Hx  Soc Hx      Reviewed and updated as needed this visit by Provider         ROS:  Constitutional, HEENT, cardiovascular, pulmonary, gi and gu systems are negative, except as otherwise noted.    OBJECTIVE:     /89 (BP Location: Left arm, Cuff Size: Adult Regular)  Pulse 94  Temp 97.7  F (36.5  C) (Tympanic)  Ht 5' 4.5\" (1.638 m)  Wt 191 lb (86.6 kg)  BMI 32.28 kg/m2  Body mass index is 32.28 kg/(m^2).  GENERAL: healthy, alert and no distress  EYES: Eyes grossly normal to inspection, PERRL and conjunctivae and sclerae normal  HENT: ear canals and TM's normal, nose and mouth without ulcers or lesions  NECK: no adenopathy, no asymmetry, masses, or scars and thyroid normal to palpation  RESP: lungs clear to auscultation - no rales, rhonchi or wheezes  CV: regular rate and rhythm, normal S1 S2, no S3 or S4, no murmur, click or rub, no peripheral edema and peripheral pulses strong  MS: no gross musculoskeletal defects noted, no edema  PSYCH: mentation appears normal, affect flat and appearance well groomed    Diagnostic Test Results:  none     ASSESSMENT/PLAN:   1. Encounter for fertility " planning  Referral placed .   - OB/GYN REFERRAL    2. Bipolar I disorder (H)  Referral placed   - MENTAL HEALTH REFERRAL  - Adult; Psychiatry and Medication Management; [place]iat[place]; [facility] - [place], [place] [phone]  Medication management & future refills will be returned to [facility] PCP upon compl...    3. Paranoid disorder (H)  - MENTAL HEALTH REFERRAL  - Adult; Psychiatry and Medication Management; Psychiatry; [facility] - [place], [place] [phone]  Medication management & future refills will be returned to [facility] PCP upon compl...    FUTURE APPOINTMENTS:       - Follow-up visit as needed.    Deni Fernandez MD  [facility]

## 2018-04-05 NOTE — MR AVS SNAPSHOT
After Visit Summary   4/5/2018    Maddy Ortiz    MRN: [id number]           Patient Information     Date Of Birth          1985        Visit Information        Provider Department      4/5/2018 7:20 AM Deni Fernandez MD [facility]        Today's Diagnoses     Encounter for fertility planning    -  1    Bipolar I disorder (H)        Paranoid disorder (H)          Care Instructions          Thank you for choosing [facility].  You may be receiving a survey in the mail from [facility] regarding your visit today.  Please take a few minutes to complete and return the survey to let us know how we are doing.      If you have questions or concerns, please contact us via [employer] or you can contact your care team at [phone].    Our Clinic hours are:  Monday 6:40 am  to 7:00 pm  Tuesday -Friday 6:40 am to 5:00 pm    The [place] outpatient lab hours are:  Monday - Friday 6:10 am to 4:45 pm  Saturdays 7:00 am to 11:00 am  Appointments are required, call [phone]    If you have clinical questions after hours or would like to schedule an appointment,  call the clinic at [phone].          Follow-ups after your visit        Additional Services     MENTAL HEALTH REFERRAL  - Adult; Psychiatry and Medication Management; Psychiatry; [facility] [place]: [facility]   [place], [place] [phone]  Medication management & future refills will be returned to [facility] PCP upon compl...       All scheduling is subject to the client's specific insurance plan & benefits, provider/location availability, and provider clinical specialities.  Please arrive 15 minutes early for your first appointment and bring your completed paperwork.    Please be aware that coverage of these services is subject to the terms and limitations of your health insurance plan.  Call member services at your health plan with any benefit or coverage questions.                      OB/GYN  REFERRAL       Your provider has referred you to:  [facility]: [facility] [phone]   [email or web address]    Will like to discuss fertility .trying to have a baby    Please be aware that coverage of these services is subject to the terms and limitations of your health insurance plan.  Call member services at your health plan with any benefit or coverage questions.      Please bring the following with you to your appointment:    (1) Any X-Rays, CTs or MRIs which have been performed.  Contact the facility where they were done to arrange for  prior to your scheduled appointment.   (2) List of current medications   (3) This referral request   (4) Any documents/labs given to you for this referral                  Your next 10 appointments already scheduled     Apr 05, 2018  1:30 PM CDT   Nurse Only with ER ALLERGY SHOTS   [facility] ([facility])    [address] [place] [address]   [phone]            Apr 10, 2018  1:00 PM CDT   Return Visit with Magdy Blevins DO   [facility] ([facility])    [address] [place] [address]   [phone]            Apr 12, 2018  3:00 PM CDT   Office Visit with Radha Nguyen MD   [facility]    [address] [address] [phone]           Bring a current list of meds and any records pertaining to this visit. For Physicals, please bring immunization records and any forms needing to be filled out. Please arrive 10 minutes early to complete paperwork.            Apr 17, 2018  1:00 PM CDT   Return Visit with Magdy Blevins DO   [facility] ([facility])    [address] [facility] [address] [place] [address]   [phone]            Apr 24, 2018  1:00 PM CDT   Return Visit with Magdy Blevins DO   [facility]  "[place] ([facility])    [address]  [facility] [address]   [phone]            May 01, 2018  1:00 PM CDT   Return Visit with Magdy Blevins, DO   [facility] ([facility])    [address]  [facility] [address]   [phone]              Who to contact     If you have questions or need follow up information about today's clinic visit or your schedule please contact [facility] directly at [phone].  Normal or non-critical lab and imaging results will be communicated to you by [employer], letter or phone within 4 business days after the clinic has received the results. If you do not hear from us within 7 days, please contact the clinic through [employer] or phone. If you have a critical or abnormal lab result, we will notify you by phone as soon as possible.  Submit refill requests through [employer] or call your pharmacy and they will forward the refill request to us. Please allow 3 business days for your refill to be completed.          Additional Information About Your Visit        MyChart Information     [employer] lets you send messages to your doctor, view your test results, renew your prescriptions, schedule appointments and more. To sign up, go to [email or web address] . Click on \"Log in\" on the left side of the screen, which will take you to the Welcome page. Then click on \"Sign up Now\" on the right side of the page.     You will be asked to enter the access code listed below, as well as some personal information. Please follow the directions to create your username and password.     Your access code is: [email or web address]  Expires: 2018  3:10 PM     Your access code will  in 90 days. If you need help or a new code, please call your [facility] or [phone].        Care EveryWhere ID     This is your Care EveryWhere ID. This could be used by other organizations to access your Early medical " "records  [id number]        Your Vitals Were     Pulse Temperature Height BMI (Body Mass Index)          94 97.7  F (36.5  C) (Tympanic) 5' 4.5\" (1.638 m) 32.28 kg/m2         Blood Pressure from Last 3 Encounters:   04/05/18 126/89   03/27/18 128/86   03/20/18 (!) 155/109    Weight from Last 3 Encounters:   04/05/18 191 lb (86.6 kg)   03/27/18 192 lb (87.1 kg)   03/20/18 194 lb (88 kg)              We Performed the Following     MENTAL HEALTH REFERRAL  - Adult; Psychiatry and Medication Management; Psychiatry; [facility]: [facility]   [place], [place] [phone]  Medication management & future refills will be returned to [facility] PCP upon compl...     OB/GYN REFERRAL        Primary Care Provider Office Phone # Fax #    [facility] [phone] [address] [address]        Equal Access to Services     [facility]: Hadii linda castro hadasho Soradha, waaxda luqadaha, qaybta kaalmada adeegyatruong, lincoln maciel . So [facility] [phone].    ATENCIÓN: Si habla español, tiene a titus disposición servicios gratuitos de asistencia lingüística. Llame al [phone].    We comply with applicable federal civil rights laws and [place] laws. We do not discriminate on the basis of race, color, national origin, age, disability, sex, sexual orientation, or gender identity.            Thank you!     Thank you for choosing [facility]  for your care. Our goal is always to provide you with excellent care. Hearing back from our patients is one way we can continue to improve our services. Please take a few minutes to complete the written survey that you may receive in the mail after your visit with us. Thank you!             Your Updated Medication List - Protect others around you: Learn how to safely use, store and throw away your medicines at www.disposemymeds.org.          This list is accurate as of 4/5/18  7:42 AM.  " Always use your most recent med list.                   Brand Name Dispense Instructions for use Diagnosis    albuterol 108 (90 BASE) MCG/ACT Inhaler    PROAIR HFA/PROVENTIL HFA/VENTOLIN HFA    1 Inhaler    Inhale 2 puffs into the lungs every 4 hours as needed    SOB (shortness of breath)       * ALLERGEN IMMUNOTHERAPY PRESCRIPTION     5 mL    Cat Hair, Standardized 10,000 BAU/mL, ALK  3.0 ml Dog Hair Dander, A. P.  1:100 w/v, HS  1.0 ml Dust Mites F 30,000AU/mL, HS  0.5 ml Dust Mites P. 30,000 AU/mL, HS  0.5 ml  Diluent: HSA qs to 5ml    Allergic rhinitis due to animal dander, Allergic rhinitis due to dust mite       * ALLERGEN IMMUNOTHERAPY PRESCRIPTION     5 mL    Alternaria Tenuis 1:10 w/v, HS  0.5 ml Epicoccum Nigrum 1:10 w/v, HS 0.5 ml Hormodendrum Cladosporioides 1:10 w/v, HS 0.5 ml Diluent: HSA qs to 5ml    Allergic rhinitis due to mold       * ALLERGEN IMMUNOTHERAPY PRESCRIPTION     5 mL    Estuardo, White  1:20 w/v, HS  0.5 ml Birch Mix PRW 1:20 w/v, HS  0.5 ml Elm, American 1:20 w/v, HS  0.5 ml Hackberry 1:20 w/v, HS 0.5 ml Hickory, Shagbark 1:20 w/v, HS  0.5 ml [place] Mix RW 1:20 w/v, HS 0.5 ml Oak Mix RVW 1:20 w/v, HS 0.5 ml Lincoln Tree, Black 1:20 w/v, HS 0.5 ml Beatty, Black 1:20 w/v, HS 0.5 ml Diluent: HSA qs to 5ml    Chronic seasonal allergic rhinitis due to pollen       * ALLERGEN IMMUNOTHERAPY PRESCRIPTION     5 mL    Kochia 1:20 w/v, HS 1.0 ml Nettle 1:20 w/v, HS 1.0 ml Plantain, English 1:20 w/v, HS 1.0 ml Ragweed Mixed 1:20 w/v ALK  0.6 ml Russian Thistle 1:20 w/v, HS 1.0 ml Diluent: HSA qs to 5ml    Chronic seasonal allergic rhinitis due to pollen       BENADRYL PO      Take 50 mg by mouth nightly as needed for allergies or sleep        cetirizine 10 MG tablet    zyrTEC    30 tablet    Take 1 tablet (10 mg) by mouth every evening    Chronic seasonal allergic rhinitis due to pollen       cyclobenzaprine 10 MG tablet    FLEXERIL    30 tablet    Take 0.5-1 tablets (5-10 mg) by mouth At Bedtime     Sacral back pain       EPINEPHrine 0.3 MG/0.3ML injection 2-pack    EPIPEN 2-ODETTE    0.6 mL    Inject 0.3 mLs (0.3 mg) into the muscle once as needed for anaphylaxis    Need for desensitization to allergens       fluticasone 50 MCG/ACT spray    FLONASE    16 g    Spray 1-2 sprays into both nostrils daily    Allergic rhinitis due to animal dander, Allergic rhinitis due to dust mite, Allergic rhinitis due to mold, Chronic seasonal allergic rhinitis due to pollen       levonorgestrel 20 MCG/24HR IUD    MIRENA     1 each (20 mcg) by Intrauterine route continuous    Encounter for insertion of mirena IUD       permethrin 1 % Liqd     60 mL    Apply to clean, towel-dried hair, saturate hair and scalp, wash off after 10 min.    Lice       ziprasidone 40 MG capsule    GEODON     Take 20 mg by mouth At Bedtime        * Notice:  This list has 4 medication(s) that are the same as other medications prescribed for you. Read the directions carefully, and ask your doctor or other care provider to review them with you.

## 2018-04-05 NOTE — PATIENT INSTRUCTIONS
Thank you for choosing [facility].  You may be receiving a survey in the mail from [facility] regarding your visit today.  Please take a few minutes to complete and return the survey to let us know how we are doing.      If you have questions or concerns, please contact us via [employer] or you can contact your care team at [phone].    Our Clinic hours are:  Monday 6:40 am  to 7:00 pm  Tuesday -Friday 6:40 am to 5:00 pm    The [place] outpatient lab hours are:  Monday - Friday 6:10 am to 4:45 pm  Saturdays 7:00 am to 11:00 am  Appointments are required, call [phone]    If you have clinical questions after hours or would like to schedule an appointment,  call the clinic at [phone].

## 2018-04-05 NOTE — NURSING NOTE
"Chief Complaint   Patient presents with     Referral     for OBGYN would like to talk about pregn      Referral     for Psych        Initial /89 (BP Location: Left arm, Cuff Size: Adult Regular)  Pulse 94  Temp 97.7  F (36.5  C) (Tympanic)  Ht 5' 4.5\" (1.638 m)  Wt 191 lb (86.6 kg)  BMI 32.28 kg/m2 Estimated body mass index is 32.28 kg/(m^2) as calculated from the following:    Height as of this encounter: 5' 4.5\" (1.638 m).    Weight as of this encounter: 191 lb (86.6 kg).  Medication Reconciliation: complete  "

## 2018-04-10 ENCOUNTER — OFFICE VISIT (OUTPATIENT)
Dept: ALLERGY | Facility: OTHER | Age: 33
End: 2018-04-10
Payer: COMMERCIAL

## 2018-04-10 DIAGNOSIS — J30.81 ALLERGIC RHINITIS DUE TO ANIMAL DANDER: Primary | ICD-10-CM

## 2018-04-10 DIAGNOSIS — J30.1 CHRONIC SEASONAL ALLERGIC RHINITIS DUE TO POLLEN: ICD-10-CM

## 2018-04-10 DIAGNOSIS — J30.89 ALLERGIC RHINITIS DUE TO DUST MITE: ICD-10-CM

## 2018-04-10 PROCEDURE — 99213 OFFICE O/P EST LOW 20 MIN: CPT | Performed by: ALLERGY & IMMUNOLOGY

## 2018-04-10 NOTE — MR AVS SNAPSHOT
After Visit Summary   4/10/2018    Maddy Ortiz    MRN: [id number]           Patient Information     Date Of Birth          1985        Visit Information        Provider Department      4/10/2018 1:00 PM Magdy Blevins DO [facility]        Today's Diagnoses     Allergic rhinitis due to animal dander    -  1       Follow-ups after your visit        Your next 10 appointments already scheduled     Apr 12, 2018  3:00 PM CDT   Office Visit with Radha Nguyen MD   [facility] ([facility])    [address] [address]   [phone]           Bring a current list of meds and any records pertaining to this visit. For Physicals, please bring immunization records and any forms needing to be filled out. Please arrive 10 minutes early to complete paperwork.            Apr 17, 2018  1:00 PM CDT   [facility]    [address]  [place] [address]   [phone]            Apr 24, 2018  1:00 PM CDT   [facility]    [address]  [place] [address]   [phone]            May 01, 2018  1:00 PM CDT   [facility]    [address]  [place] [address]   [phone]            May 15, 2018  7:45 AM CDT   (Arrive by 7:30 AM)   New Visit with PHILL Kelley CNS   [facility]    [address] [address]   [phone]              Who to contact     If you have questions or need follow up information about today's clinic visit or your schedule please contact [facility] directly at [phone].  Normal or non-critical lab and imaging results  "will be communicated to you by MyChart, letter or phone within 4 business days after the clinic has received the results. If you do not hear from us within 7 days, please contact the clinic through [facility] or phone. If you have a critical or abnormal lab result, we will notify you by phone as soon as possible.  Submit refill requests through [employer] or call your pharmacy and they will forward the refill request to us. Please allow 3 business days for your refill to be completed.          Additional Information About Your Visit        [employer] Information     [employer] lets you send messages to your doctor, view your test results, renew your prescriptions, schedule appointments and more. To sign up, go to [email or web address] . Click on \"Log in\" on the left side of the screen, which will take you to the Welcome page. Then click on \"Sign up Now\" on the right side of the page.     You will be asked to enter the access code listed below, as well as some personal information. Please follow the directions to create your username and password.     Your access code is: [email or web address]  Expires: 2018  3:10 PM     Your access code will  in 90 days. If you need help or a new code, please call your [facility] or [phone].        Care EveryWhere ID     This is your Care EveryWhere ID. This could be used by other organizations to access your [place] medical records  [id number]         Blood Pressure from Last 3 Encounters:   18 126/89   18 128/86   18 (!) 155/109    Weight from Last 3 Encounters:   18 86.6 kg (191 lb)   18 87.1 kg (192 lb)   18 88 kg (194 lb)              Today, you had the following     No orders found for display       Primary Care Provider Office Phone # Fax #    [facility] [phone] [address] [address]        Equal Access to Services     ARMOND MCKEON AH: pancho Machado, " sally solano, lincoln chapaaan ah. So [facility] [phone].    ATENCIÓN: Si elida west, tiene a titus disposición servicios gratuitos de asistencia lingüística. Yadira al [phone].    We comply with applicable federal civil rights laws and [place] laws. We do not discriminate on the basis of race, color, national origin, age, disability, sex, sexual orientation, or gender identity.            Thank you!     Thank you for choosing [facility]  for your care. Our goal is always to provide you with excellent care. Hearing back from our patients is one way we can continue to improve our services. Please take a few minutes to complete the written survey that you may receive in the mail after your visit with us. Thank you!             Your Updated Medication List - Protect others around you: Learn how to safely use, store and throw away your medicines at www.disposemymeds.org.          This list is accurate as of 4/10/18  2:19 PM.  Always use your most recent med list.                   Brand Name Dispense Instructions for use Diagnosis    albuterol 108 (90 Base) MCG/ACT Inhaler    PROAIR HFA/PROVENTIL HFA/VENTOLIN HFA    1 Inhaler    Inhale 2 puffs into the lungs every 4 hours as needed    SOB (shortness of breath)       * ALLERGEN IMMUNOTHERAPY PRESCRIPTION     5 mL    Cat Hair, Standardized 10,000 BAU/mL, ALK  3.0 ml Dog Hair Dander, A. P.  1:100 w/v, HS  1.0 ml Dust Mites F 30,000AU/mL, HS  0.5 ml Dust Mites P. 30,000 AU/mL, HS  0.5 ml  Diluent: HSA qs to 5ml    Allergic rhinitis due to animal dander, Allergic rhinitis due to dust mite       * ALLERGEN IMMUNOTHERAPY PRESCRIPTION     5 mL    Alternaria Tenuis 1:10 w/v, HS  0.5 ml Epicoccum Nigrum 1:10 w/v, HS 0.5 ml Hormodendrum Cladosporioides 1:10 w/v, HS 0.5 ml Diluent: HSA qs to 5ml    Allergic rhinitis due to mold       * ALLERGEN IMMUNOTHERAPY PRESCRIPTION     5 mL    Estuardo, White  1:20 w/v, HS  0.5 ml Birch Mix  PRW 1:20 w/v, HS  0.5 ml Elm, American 1:20 w/v, HS  0.5 ml Hackberry 1:20 w/v, HS 0.5 ml Hickory, Shagbark 1:20 w/v, HS  0.5 ml [place] Mix RW 1:20 w/v, HS 0.5 ml Oak Mix RVW 1:20 w/v, HS 0.5 ml [place] Tree, Black 1:20 w/v, HS 0.5 ml Melvin, Black 1:20 w/v, HS 0.5 ml Diluent: HSA qs to 5ml    Chronic seasonal allergic rhinitis due to pollen       * ALLERGEN IMMUNOTHERAPY PRESCRIPTION     5 mL    Kochia 1:20 w/v, HS 1.0 ml Nettle 1:20 w/v, HS 1.0 ml Plantain, English 1:20 w/v, HS 1.0 ml Ragweed Mixed 1:20 w/v ALK  0.6 ml Russian Thistle 1:20 w/v, HS 1.0 ml Diluent: HSA qs to 5ml    Chronic seasonal allergic rhinitis due to pollen       BENADRYL PO      Take 50 mg by mouth nightly as needed for allergies or sleep        cetirizine 10 MG tablet    zyrTEC    30 tablet    Take 1 tablet (10 mg) by mouth every evening    Chronic seasonal allergic rhinitis due to pollen       cyclobenzaprine 10 MG tablet    FLEXERIL    30 tablet    Take 0.5-1 tablets (5-10 mg) by mouth At Bedtime    Sacral back pain       EPINEPHrine 0.3 MG/0.3ML injection 2-pack    EPIPEN 2-ODETTE    0.6 mL    Inject 0.3 mLs (0.3 mg) into the muscle once as needed for anaphylaxis    Need for desensitization to allergens       fluticasone 50 MCG/ACT spray    FLONASE    16 g    Spray 1-2 sprays into both nostrils daily    Allergic rhinitis due to animal dander, Allergic rhinitis due to dust mite, Allergic rhinitis due to mold, Chronic seasonal allergic rhinitis due to pollen       levonorgestrel 20 MCG/24HR IUD    MIRENA     1 each (20 mcg) by Intrauterine route continuous    Encounter for insertion of mirena IUD       permethrin 1 % Liqd     60 mL    Apply to clean, towel-dried hair, saturate hair and scalp, wash off after 10 min.    Lice       ziprasidone 40 MG capsule    GEODON     Take 20 mg by mouth At Bedtime        * Notice:  This list has 4 medication(s) that are the same as other medications prescribed for you. Read the directions carefully, and ask  your doctor or other care provider to review them with you.

## 2018-04-10 NOTE — PROGRESS NOTES
The patient presented today for cluster immunotherapy.  She was hesitant to begin shots today.  She stated that she was not feeling well, but she could not describe how she was feeling.  Upon further questioning the patient stated that she felt like she was shrinking and the shots were getting bigger.  The patient has psychiatric medical conditions and follows with psychiatry.  She additionally complained of right ear pain.    Past Medical History:   Diagnosis Date     Bipolar 1 disorder (H) 12/6/2012     Paranoid type delusional disorder (H) 11/14/2012     Family History   Problem Relation Age of Onset     Adopted: Yes     Unknown/Adopted Mother      Unknown/Adopted Father      Unknown/Adopted Other      Past Surgical History:   Procedure Laterality Date     TONSILLECTOMY & ADENOIDECTOMY      as a child         Current Outpatient Prescriptions:      ORDER FOR ALLERGEN IMMUNOTHERAPY, Cat Hair, Standardized 10,000 BAU/mL, ALK  3.0 ml Dog Hair Dander, A. P.  1:100 w/v, HS  1.0 ml Dust Mites F 30,000AU/mL, HS  0.5 ml Dust Mites P. 30,000 AU/mL, HS  0.5 ml  Diluent: HSA qs to 5ml, Disp: 5 mL, Rfl: PRN     ORDER FOR ALLERGEN IMMUNOTHERAPY, Alternaria Tenuis 1:10 w/v, HS  0.5 ml Epicoccum Nigrum 1:10 w/v, HS 0.5 ml Hormodendrum Cladosporioides 1:10 w/v, HS 0.5 ml Diluent: HSA qs to 5ml, Disp: 5 mL, Rfl: PRN     ORDER FOR ALLERGEN IMMUNOTHERAPY, Estuardo, White  1:20 w/v, HS  0.5 ml Birch Mix PRW 1:20 w/v, HS  0.5 ml Elm, American 1:20 w/v, HS  0.5 ml Hackberry 1:20 w/v, HS 0.5 ml Hickory, Shagbark 1:20 w/v, HS  0.5 ml [place] Mix RW 1:20 w/v, HS 0.5 ml Oak Mix RVW 1:20 w/v, HS 0.5 ml [place] Tree, Black 1:20 w/v, HS 0.5 ml [place], Black 1:20 w/v, HS 0.5 ml Diluent: HSA qs to 5ml, Disp: 5 mL, Rfl: PRN     ORDER FOR ALLERGEN IMMUNOTHERAPY, Kochia 1:20 w/v, HS 1.0 ml Nettle 1:20 w/v, HS 1.0 ml Plantain, English 1:20 w/v, HS 1.0 ml Ragweed Mixed 1:20 w/v ALK  0.6 ml Russian Thistle 1:20 w/v, HS 1.0 ml Diluent: HSA qs to 5ml, Disp:  5 mL, Rfl: PRN     albuterol (PROAIR HFA/PROVENTIL HFA/VENTOLIN HFA) 108 (90 BASE) MCG/ACT Inhaler, Inhale 2 puffs into the lungs every 4 hours as needed (Patient not taking: Reported on 4/5/2018), Disp: 1 Inhaler, Rfl: 3     fluticasone (FLONASE) 50 MCG/ACT spray, Spray 1-2 sprays into both nostrils daily (Patient not taking: Reported on 3/13/2018), Disp: 16 g, Rfl: 11     levonorgestrel (MIRENA) 20 MCG/24HR IUD, 1 each (20 mcg) by Intrauterine route continuous (Patient not taking: Reported on 4/5/2018), Disp: , Rfl:      cyclobenzaprine (FLEXERIL) 10 MG tablet, Take 0.5-1 tablets (5-10 mg) by mouth At Bedtime (Patient not taking: Reported on 8/1/2017), Disp: 30 tablet, Rfl: 0     permethrin 1 % LIQD, Apply to clean, towel-dried hair, saturate hair and scalp, wash off after 10 min. (Patient not taking: Reported on 4/5/2018), Disp: 60 mL, Rfl: 1     cetirizine (ZYRTEC) 10 MG tablet, Take 1 tablet (10 mg) by mouth every evening, Disp: 30 tablet, Rfl: 11     DiphenhydrAMINE HCl (BENADRYL PO), Take 50 mg by mouth nightly as needed for allergies or sleep, Disp: , Rfl:      EPINEPHrine (EPIPEN 2-ODETTE) 0.3 MG/0.3ML injection, Inject 0.3 mLs (0.3 mg) into the muscle once as needed for anaphylaxis, Disp: 0.6 mL, Rfl: 3     ziprasidone (GEODON) 40 MG capsule, Take 20 mg by mouth At Bedtime , Disp: , Rfl: 5  Immunization History   Administered Date(s) Administered     Influenza (IIV3) PF 12/06/2012     Influenza Vaccine IM 3yrs+ 4 Valent IIV4 11/21/2017     TDAP Vaccine (Adacel) 12/06/2012     Allergies   Allergen Reactions     Animal Dander      Nkda [No Known Drug Allergies]      Seasonal Allergies      Weeds and mold         EXAM:   Constitutional:  Appears well-developed and well-nourished. No distress.   HEENT:   Head: Normocephalic.    Neuro: Oriented to person, place, and time.  Skin: Skin is warm and dry. No rash noted.   Psychiatric: Patient very quiet and thought process were scattered and comments not appropriate  for questions asked.     Nursing note and vitals reviewed.      ASSESSMENT/PLAN:  Problem List Items Addressed This Visit        Respiratory    Seasonal allergic rhinitis due to pollen     History of nasal and ocular symptoms for multiple years. Perennial with spring and fall worsening. Tried cetirizine and loratadine which were helpful. Currently used Allegra as needed. Allergy testing done in 2013 positive for dust mite, cat, dog, molds, trees, grasses and weeds.       Skin testing  Positive for cat, dog, dust mites, weeds, trees and molds.       - Flonase 2 spray/nostril daily. Use spring and fall at the very least.   - Allegra or Zyrtec as needed and on allergy shot days.   - I did not feel that the patient was psychiatrically stable to proceed with allergy shots today.  In fact I would prefer to hold the patient from getting any further allergy shots until she has followed up with psychiatry and is in a more stable position.  I instructed that the patient follow-up with her primary care provider as soon as possible and psychiatry as soon as possible.           Allergic rhinitis due to animal dander - Primary    Allergic rhinitis due to dust mite          Chart documentation with Dragon Voice recognition Software. Although reviewed after completion, some words and grammatical errors may remain.    Magdy Blevins,    Allergy/Immunology  [facility], [place] and [facility]

## 2018-04-10 NOTE — LETTER
4/10/2018         RE: Mdady Ortiz  [address]   [place] [address]        Dear Colleague,    Thank you for referring your patient, Maddy Ortiz, to the [facility]. Please see a copy of my visit note below.    The patient presented today for cluster immunotherapy.  She was hesitant to begin shots today.  She stated that she was not feeling well, but she could not describe how she was feeling.  Upon further questioning the patient stated that she felt like she was shrinking and the shots were getting bigger.  The patient has psychiatric medical conditions and follows with psychiatry.  I did not feel that the patient was psychiatrically stable to proceed with allergy shots today.  In fact I would prefer to hold the patient from getting any further allergy shots until she has followed up with psychiatry and is in a more stable position.  I instructed that the patient follow-up with her primary care provider as soon as possible and psychiatry as soon as possible.    Again, thank you for allowing me to participate in the care of your patient.        Sincerely,        Magdy Blevins, DO

## 2018-04-11 NOTE — ASSESSMENT & PLAN NOTE
History of nasal and ocular symptoms for multiple years. Perennial with spring and fall worsening. Tried cetirizine and loratadine which were helpful. Currently used Allegra as needed. Allergy testing done in 2013 positive for dust mite, cat, dog, molds, trees, grasses and weeds.       Skin testing  Positive for cat, dog, dust mites, weeds, trees and molds.       - Flonase 2 spray/nostril daily. Use spring and fall at the very least.   - Allegra or Zyrtec as needed and on allergy shot days.   - I did not feel that the patient was psychiatrically stable to proceed with allergy shots today.  In fact I would prefer to hold the patient from getting any further allergy shots until she has followed up with psychiatry and is in a more stable position.  I instructed that the patient follow-up with her primary care provider as soon as possible and psychiatry as soon as possible.

## 2018-04-12 ENCOUNTER — OFFICE VISIT (OUTPATIENT)
Dept: OBGYN | Facility: CLINIC | Age: 33
End: 2018-04-12
Payer: COMMERCIAL

## 2018-04-12 VITALS
DIASTOLIC BLOOD PRESSURE: 83 MMHG | RESPIRATION RATE: 16 BRPM | BODY MASS INDEX: 32.15 KG/M2 | TEMPERATURE: 98.2 F | WEIGHT: 193 LBS | HEIGHT: 65 IN | SYSTOLIC BLOOD PRESSURE: 122 MMHG | HEART RATE: 114 BPM

## 2018-04-12 DIAGNOSIS — Z31.69 ENCOUNTER FOR PRECONCEPTION CONSULTATION: Primary | ICD-10-CM

## 2018-04-12 PROBLEM — R06.02 SOB (SHORTNESS OF BREATH): Status: RESOLVED | Noted: 2017-12-22 | Resolved: 2018-04-12

## 2018-04-12 PROCEDURE — 99213 OFFICE O/P EST LOW 20 MIN: CPT | Performed by: OBSTETRICS & GYNECOLOGY

## 2018-04-12 NOTE — PROGRESS NOTES
"Maddy is a 32 year old  here for follow up of IUD removal (possibly).     She is here with her partner, and she would like a referral to [facility].  She is interested in using donor egg and having PGD.  She and her partner have been together for over 4 years.      ROS: Ten point review of systems was reviewed and negative except the above.    PMH: Her past medical, surgical, and obstetric histories were reviewed and are documented in their appropriate chart areas.    ALL/Meds: Her medication and allergy histories were reviewed and are documented in their appropriate chart areas.    PE: /83 (BP Location: Left arm, Patient Position: Sitting, Cuff Size: Adult Large)  Pulse 114  Temp 98.2  F (36.8  C) (Tympanic)  Resp 16  Ht 5' 4.5\" (1.638 m)  Wt 193 lb (87.5 kg)  Breastfeeding? No  BMI 32.62 kg/m2    General Appearance:  healthy, alert, active, no distress  HEENT: NCAT    A/P 32 year old  here for     ICD-10-CM    1. Encounter for preconception consultation Z31.69         1. Patient and partner counseled regarding what is involved with donor egg and PGD.  Discussed these issues are better explored with [facility] clinic.  Patient is interested in donor egg due to her age and due to concerns about her health.  She is planning on seeing a psychiatry NP to ensure she is on the proper medication.      We discussed that if her plan includes donor egg rather than her own conception, removing her IUD would result in risk of natural conception.  If this is undesirable to her, then she should consider leaving it in until she is sure she is ready to conceive.      Resources for area [facility] clinics were given to the patient.  She will let us know when she is really ready to have it removed.      We discussed healthy pre-conception habits including making sure that her mental health is optimized. Discussed prenatal vitamins, healthy diet and exercise.    Radha Nguyen M.D.      20 minutes was spent face to face with " the patient today discussing her history, diagnosis, and follow-up plan as noted above.  Over 50% of the visit was spent in counseling and coordination of care.

## 2018-04-12 NOTE — NURSING NOTE
"Chief Complaint   Patient presents with     Consult       Initial /83 (BP Location: Left arm, Patient Position: Sitting, Cuff Size: Adult Large)  Pulse 114  Temp 98.2  F (36.8  C) (Tympanic)  Resp 16  Ht 5' 4.5\" (1.638 m)  Wt 193 lb (87.5 kg)  Breastfeeding? No  BMI 32.62 kg/m2 Estimated body mass index is 32.62 kg/(m^2) as calculated from the following:    Height as of this encounter: 5' 4.5\" (1.638 m).    Weight as of this encounter: 193 lb (87.5 kg).  Medication Reconciliation: complete   Sridevi Anand CMA      "

## 2018-04-12 NOTE — MR AVS SNAPSHOT
After Visit Summary   4/12/2018    Maddy Ortiz    MRN: [id number]           Patient Information     Date Of Birth          1985        Visit Information        Provider Department      4/12/2018 3:00 PM Radha Nguyen MD [facility]        Today's Diagnoses     Encounter for preconception consultation    -  1       Follow-ups after your visit        Your next 10 appointments already scheduled     Apr 17, 2018  1:00 PM CDT   Return Visit with Magdy Blevins DO   [facility] ([facility]    [address]  [place] [address]   [phone]            Apr 24, 2018  1:00 PM CDT   Return Visit with Magdy Blevins DO   [facility] ([facility])    [address]  [place] [address]   [phone]            May 01, 2018  1:00 PM CDT   Return Visit with Magdy Blevins DO   [facility] ([facility])    [address]  [place] [address]   [phone]            May 15, 2018  7:45 AM CDT   (Arrive by 7:30 AM)   New Visit with PHILL Kelley [facility] ([facility])    [address] [address] [phone]              Who to contact     If you have questions or need follow up information about today's clinic visit or your schedule please contact [facility] directly at [phone].  Normal or non-critical lab and imaging results will be communicated to you by MyChart, letter or phone within 4 business days after the clinic has received the results. If you do not hear from us within 7 days, please contact the clinic through MyChart or phone. If you have a critical or abnormal lab result, we will notify you by phone as soon as possible.  Submit refill requests through [employer] or call your pharmacy and they will forward the refill request to us.  "Please allow 3 business days for your refill to be completed.          Additional Information About Your Visit        MyChart Information     [facility] lets you send messages to your doctor, view your test results, renew your prescriptions, schedule appointments and more. To sign up, go to [email or web address] . Click on \"Log in\" on the left side of the screen, which will take you to the Welcome page. Then click on \"Sign up Now\" on the right side of the page.     You will be asked to enter the access code listed below, as well as some personal information. Please follow the directions to create your username and password.     Your access code is: [email or web address]  Expires: 2018  3:10 PM     Your access code will  in 90 days. If you need help or a new code, please call your [place] clinic or [phone].        Care EveryWhere ID     This is your [facility] EveryWhere ID. This could be used by other organizations to access your [place] medical records  [id number]        Your Vitals Were     Pulse Temperature Respirations Height Breastfeeding? BMI (Body Mass Index)    114 98.2  F (36.8  C) (Tympanic) 16 5' 4.5\" (1.638 m) No 32.62 kg/m2       Blood Pressure from Last 3 Encounters:   18 122/83   18 126/89   18 128/86    Weight from Last 3 Encounters:   18 193 lb (87.5 kg)   18 191 lb (86.6 kg)   18 192 lb (87.1 kg)              Today, you had the following     No orders found for display       Primary Care Provider Office Phone # Fax #    [facility] [phone] [address] [address]        Equal Access to Services     ARMOND MCKEON : Kamryn Ackerman, pancho rondon, sally solano, lincoln fernandez. So [facility] [phone].    ATENCIÓN: Si habla español, tiene a titus disposición servicios gratuitos de asistencia lingüística. Llame al [phone].    We comply with applicable " federal civil rights laws and [place] laws. We do not discriminate on the basis of race, color, national origin, age, disability, sex, sexual orientation, or gender identity.            Thank you!     Thank you for choosing [facility]  for your care. Our goal is always to provide you with excellent care. Hearing back from our patients is one way we can continue to improve our services. Please take a few minutes to complete the written survey that you may receive in the mail after your visit with us. Thank you!             Your Updated Medication List - Protect others around you: Learn how to safely use, store and throw away your medicines at www.disposemymeds.org.          This list is accurate as of 4/12/18  3:38 PM.  Always use your most recent med list.                   Brand Name Dispense Instructions for use Diagnosis    albuterol 108 (90 Base) MCG/ACT Inhaler    PROAIR HFA/PROVENTIL HFA/VENTOLIN HFA    1 Inhaler    Inhale 2 puffs into the lungs every 4 hours as needed    SOB (shortness of breath)       * ALLERGEN IMMUNOTHERAPY PRESCRIPTION     5 mL    Cat Hair, Standardized 10,000 BAU/mL, ALK  3.0 ml Dog Hair Dander, A. P.  1:100 w/v, HS  1.0 ml Dust Mites F 30,000AU/mL, HS  0.5 ml Dust Mites P. 30,000 AU/mL, HS  0.5 ml  Diluent: HSA qs to 5ml    Allergic rhinitis due to animal dander, Allergic rhinitis due to dust mite       * ALLERGEN IMMUNOTHERAPY PRESCRIPTION     5 mL    Alternaria Tenuis 1:10 w/v, HS  0.5 ml Epicoccum Nigrum 1:10 w/v, HS 0.5 ml Hormodendrum Cladosporioides 1:10 w/v, HS 0.5 ml Diluent: HSA qs to 5ml    Allergic rhinitis due to mold       * ALLERGEN IMMUNOTHERAPY PRESCRIPTION     5 mL    Estuardo, White  1:20 w/v, HS  0.5 ml Birch Mix PRW 1:20 w/v, HS  0.5 ml Elm, American 1:20 w/v, HS  0.5 ml Hackberry 1:20 w/v, HS 0.5 ml Hickory, Shagbark 1:20 w/v, HS  0.5 ml [place] Mix RW 1:20 w/v, HS 0.5 ml Oak Mix RVW 1:20 w/v, HS 0.5 ml [place] Tree, Black 1:20 w/v, HS 0.5 ml [place],  Black 1:20 w/v, HS 0.5 ml Diluent: HSA qs to 5ml    Chronic seasonal allergic rhinitis due to pollen       * ALLERGEN IMMUNOTHERAPY PRESCRIPTION     5 mL    Kochia 1:20 w/v, HS 1.0 ml Nettle 1:20 w/v, HS 1.0 ml Plantain, English 1:20 w/v, HS 1.0 ml Ragweed Mixed 1:20 w/v ALK  0.6 ml Russian Thistle 1:20 w/v, HS 1.0 ml Diluent: HSA qs to 5ml    Chronic seasonal allergic rhinitis due to pollen       BENADRYL PO      Take 50 mg by mouth nightly as needed for allergies or sleep        cetirizine 10 MG tablet    zyrTEC    30 tablet    Take 1 tablet (10 mg) by mouth every evening    Chronic seasonal allergic rhinitis due to pollen       cyclobenzaprine 10 MG tablet    FLEXERIL    30 tablet    Take 0.5-1 tablets (5-10 mg) by mouth At Bedtime    Sacral back pain       EPINEPHrine 0.3 MG/0.3ML injection 2-pack    EPIPEN 2-ODETTE    0.6 mL    Inject 0.3 mLs (0.3 mg) into the muscle once as needed for anaphylaxis    Need for desensitization to allergens       fluticasone 50 MCG/ACT spray    FLONASE    16 g    Spray 1-2 sprays into both nostrils daily    Allergic rhinitis due to animal dander, Allergic rhinitis due to dust mite, Allergic rhinitis due to mold, Chronic seasonal allergic rhinitis due to pollen       levonorgestrel 20 MCG/24HR IUD    MIRENA     1 each (20 mcg) by Intrauterine route continuous    Encounter for insertion of mirena IUD       permethrin 1 % Liqd     60 mL    Apply to clean, towel-dried hair, saturate hair and scalp, wash off after 10 min.    Lice       ziprasidone 40 MG capsule    GEODON     Take 20 mg by mouth At Bedtime        * Notice:  This list has 4 medication(s) that are the same as other medications prescribed for you. Read the directions carefully, and ask your doctor or other care provider to review them with you.

## 2018-04-17 ENCOUNTER — TELEPHONE (OUTPATIENT)
Dept: ALLERGY | Facility: CLINIC | Age: 33
End: 2018-04-17

## 2018-04-17 NOTE — TELEPHONE ENCOUNTER
Contacted the patient, per Dr. Blevins's request. At her appointment last week he spoke with the patient regarding her mental health and advised her to see her psychiatrist or PCP before continuing on with allergy injections. Asked patient if she had followed up with either yet, she stated that she has not, but she will be able to make an appointment with one of them tomorrow. Advised the patient to give us a call after her follow up appointment with them to update Dr. Blevins on what was decided at that appointment. Cancelled the patient's appointment for today, will give the patient a call on 4/20/18 if we have not heard from her by then. Patient agrees to plan. To DENTON Cortez.    Neeru Carrasco, BRUCE on 4/17/2018 at 10:42 AM

## 2018-04-23 ENCOUNTER — TELEPHONE (OUTPATIENT)
Dept: BEHAVIORAL HEALTH | Facility: CLINIC | Age: 33
End: 2018-04-23

## 2018-04-23 ENCOUNTER — HOSPITAL ENCOUNTER (EMERGENCY)
Facility: CLINIC | Age: 33
Discharge: OTHER INSTITUTION | End: 2018-04-23
Attending: STUDENT IN AN ORGANIZED HEALTH CARE EDUCATION/TRAINING PROGRAM | Admitting: STUDENT IN AN ORGANIZED HEALTH CARE EDUCATION/TRAINING PROGRAM
Payer: COMMERCIAL

## 2018-04-23 ENCOUNTER — HOSPITAL ENCOUNTER (INPATIENT)
Facility: CLINIC | Age: 33
LOS: 10 days | Discharge: HOME | End: 2018-05-03
Attending: PSYCHIATRY & NEUROLOGY | Admitting: PSYCHIATRY & NEUROLOGY
Payer: COMMERCIAL

## 2018-04-23 ENCOUNTER — TELEPHONE (OUTPATIENT)
Dept: FAMILY MEDICINE | Facility: CLINIC | Age: 33
End: 2018-04-23

## 2018-04-23 VITALS
RESPIRATION RATE: 18 BRPM | HEIGHT: 63 IN | TEMPERATURE: 98 F | WEIGHT: 190 LBS | DIASTOLIC BLOOD PRESSURE: 89 MMHG | OXYGEN SATURATION: 97 % | SYSTOLIC BLOOD PRESSURE: 125 MMHG | BODY MASS INDEX: 33.66 KG/M2

## 2018-04-23 DIAGNOSIS — F41.8 DEPRESSION WITH ANXIETY: ICD-10-CM

## 2018-04-23 DIAGNOSIS — G47.00 INSOMNIA, UNSPECIFIED TYPE: ICD-10-CM

## 2018-04-23 DIAGNOSIS — R45.89 THOUGHTS OF SELF HARM: ICD-10-CM

## 2018-04-23 DIAGNOSIS — F29 PSYCHOSIS, UNSPECIFIED PSYCHOSIS TYPE (H): ICD-10-CM

## 2018-04-23 DIAGNOSIS — J30.1 CHRONIC SEASONAL ALLERGIC RHINITIS DUE TO POLLEN: ICD-10-CM

## 2018-04-23 DIAGNOSIS — F31.9 BIPOLAR 1 DISORDER (H): Primary | ICD-10-CM

## 2018-04-23 DIAGNOSIS — F41.9 ANXIETY: ICD-10-CM

## 2018-04-23 LAB
ALBUMIN SERPL-MCNC: 3.6 G/DL (ref 3.4–5)
ALP SERPL-CCNC: 73 U/L (ref 40–150)
ALT SERPL W P-5'-P-CCNC: 36 U/L (ref 0–50)
AMPHETAMINES UR QL SCN: NEGATIVE
ANION GAP SERPL CALCULATED.3IONS-SCNC: 6 MMOL/L (ref 3–14)
APAP SERPL-MCNC: <2 MG/L (ref 10–20)
AST SERPL W P-5'-P-CCNC: 18 U/L (ref 0–45)
BARBITURATES UR QL: NEGATIVE
BASOPHILS # BLD AUTO: 0 10E9/L (ref 0–0.2)
BASOPHILS NFR BLD AUTO: 0.3 %
BENZODIAZ UR QL: NEGATIVE
BILIRUB SERPL-MCNC: 0.3 MG/DL (ref 0.2–1.3)
BUN SERPL-MCNC: 13 MG/DL (ref 7–30)
CALCIUM SERPL-MCNC: 8 MG/DL (ref 8.5–10.1)
CANNABINOIDS UR QL SCN: NEGATIVE
CHLORIDE SERPL-SCNC: 109 MMOL/L (ref 94–109)
CO2 SERPL-SCNC: 22 MMOL/L (ref 20–32)
COCAINE UR QL: NEGATIVE
CREAT SERPL-MCNC: 0.76 MG/DL (ref 0.52–1.04)
DIFFERENTIAL METHOD BLD: ABNORMAL
EOSINOPHIL # BLD AUTO: 0.2 10E9/L (ref 0–0.7)
EOSINOPHIL NFR BLD AUTO: 1.2 %
ERYTHROCYTE [DISTWIDTH] IN BLOOD BY AUTOMATED COUNT: 11.9 % (ref 10–15)
ETHANOL SERPL-MCNC: <0.01 G/DL
GFR SERPL CREATININE-BSD FRML MDRD: 88 ML/MIN/1.7M2
GLUCOSE SERPL-MCNC: 123 MG/DL (ref 70–99)
HCG UR QL: NEGATIVE
HCT VFR BLD AUTO: 41 % (ref 35–47)
HGB BLD-MCNC: 14 G/DL (ref 11.7–15.7)
IMM GRANULOCYTES # BLD: 0 10E9/L (ref 0–0.4)
IMM GRANULOCYTES NFR BLD: 0.3 %
LYMPHOCYTES # BLD AUTO: 2.4 10E9/L (ref 0.8–5.3)
LYMPHOCYTES NFR BLD AUTO: 16.8 %
MCH RBC QN AUTO: 29.8 PG (ref 26.5–33)
MCHC RBC AUTO-ENTMCNC: 34.1 G/DL (ref 31.5–36.5)
MCV RBC AUTO: 87 FL (ref 78–100)
MONOCYTES # BLD AUTO: 0.8 10E9/L (ref 0–1.3)
MONOCYTES NFR BLD AUTO: 5.9 %
NEUTROPHILS # BLD AUTO: 10.7 10E9/L (ref 1.6–8.3)
NEUTROPHILS NFR BLD AUTO: 75.5 %
OPIATES UR QL SCN: NEGATIVE
PCP UR QL SCN: NEGATIVE
PLATELET # BLD AUTO: 245 10E9/L (ref 150–450)
POTASSIUM SERPL-SCNC: 3.7 MMOL/L (ref 3.4–5.3)
PROT SERPL-MCNC: 6.8 G/DL (ref 6.8–8.8)
RBC # BLD AUTO: 4.7 10E12/L (ref 3.8–5.2)
SALICYLATES SERPL-MCNC: <2 MG/DL
SODIUM SERPL-SCNC: 137 MMOL/L (ref 133–144)
TSH SERPL DL<=0.005 MIU/L-ACNC: 2.33 MU/L (ref 0.4–4)
WBC # BLD AUTO: 14.1 10E9/L (ref 4–11)

## 2018-04-23 PROCEDURE — 80329 ANALGESICS NON-OPIOID 1 OR 2: CPT | Performed by: STUDENT IN AN ORGANIZED HEALTH CARE EDUCATION/TRAINING PROGRAM

## 2018-04-23 PROCEDURE — 12400007 ZZH R&B MH INTERMEDIATE UMMC

## 2018-04-23 PROCEDURE — 99285 EMERGENCY DEPT VISIT HI MDM: CPT | Mod: Z6 | Performed by: STUDENT IN AN ORGANIZED HEALTH CARE EDUCATION/TRAINING PROGRAM

## 2018-04-23 PROCEDURE — 85025 COMPLETE CBC W/AUTO DIFF WBC: CPT | Performed by: STUDENT IN AN ORGANIZED HEALTH CARE EDUCATION/TRAINING PROGRAM

## 2018-04-23 PROCEDURE — 99285 EMERGENCY DEPT VISIT HI MDM: CPT | Mod: 25 | Performed by: STUDENT IN AN ORGANIZED HEALTH CARE EDUCATION/TRAINING PROGRAM

## 2018-04-23 PROCEDURE — 84443 ASSAY THYROID STIM HORMONE: CPT | Performed by: STUDENT IN AN ORGANIZED HEALTH CARE EDUCATION/TRAINING PROGRAM

## 2018-04-23 PROCEDURE — 80307 DRUG TEST PRSMV CHEM ANLYZR: CPT | Performed by: STUDENT IN AN ORGANIZED HEALTH CARE EDUCATION/TRAINING PROGRAM

## 2018-04-23 PROCEDURE — 25000132 ZZH RX MED GY IP 250 OP 250 PS 637

## 2018-04-23 PROCEDURE — 80307 DRUG TEST PRSMV CHEM ANLYZR: CPT | Mod: 59 | Performed by: STUDENT IN AN ORGANIZED HEALTH CARE EDUCATION/TRAINING PROGRAM

## 2018-04-23 PROCEDURE — 80320 DRUG SCREEN QUANTALCOHOLS: CPT | Mod: 59 | Performed by: STUDENT IN AN ORGANIZED HEALTH CARE EDUCATION/TRAINING PROGRAM

## 2018-04-23 PROCEDURE — 81025 URINE PREGNANCY TEST: CPT | Performed by: STUDENT IN AN ORGANIZED HEALTH CARE EDUCATION/TRAINING PROGRAM

## 2018-04-23 PROCEDURE — 80329 ANALGESICS NON-OPIOID 1 OR 2: CPT | Mod: 91 | Performed by: STUDENT IN AN ORGANIZED HEALTH CARE EDUCATION/TRAINING PROGRAM

## 2018-04-23 PROCEDURE — 90791 PSYCH DIAGNOSTIC EVALUATION: CPT

## 2018-04-23 PROCEDURE — 80053 COMPREHEN METABOLIC PANEL: CPT | Performed by: STUDENT IN AN ORGANIZED HEALTH CARE EDUCATION/TRAINING PROGRAM

## 2018-04-23 RX ORDER — CETIRIZINE HYDROCHLORIDE 10 MG/1
10 TABLET ORAL EVERY EVENING
Status: DISCONTINUED | OUTPATIENT
Start: 2018-04-23 | End: 2018-04-29

## 2018-04-23 RX ORDER — ALBUTEROL SULFATE 90 UG/1
2 AEROSOL, METERED RESPIRATORY (INHALATION) EVERY 4 HOURS PRN
Status: DISCONTINUED | OUTPATIENT
Start: 2018-04-23 | End: 2018-05-03 | Stop reason: HOSPADM

## 2018-04-23 RX ORDER — FLUTICASONE PROPIONATE 50 MCG
1-2 SPRAY, SUSPENSION (ML) NASAL DAILY
Status: DISCONTINUED | OUTPATIENT
Start: 2018-04-24 | End: 2018-04-24

## 2018-04-23 RX ORDER — HYDROXYZINE HYDROCHLORIDE 25 MG/1
25 TABLET, FILM COATED ORAL EVERY 4 HOURS PRN
Status: DISCONTINUED | OUTPATIENT
Start: 2018-04-23 | End: 2018-05-03 | Stop reason: HOSPADM

## 2018-04-23 RX ORDER — ZIPRASIDONE HYDROCHLORIDE 40 MG/1
40 CAPSULE ORAL EVERY EVENING
Status: DISCONTINUED | OUTPATIENT
Start: 2018-04-23 | End: 2018-04-24

## 2018-04-23 RX ADMIN — CETIRIZINE HYDROCHLORIDE 10 MG: 10 TABLET, FILM COATED ORAL at 20:20

## 2018-04-23 RX ADMIN — ZIPRASIDONE HCL 40 MG: 40 CAPSULE ORAL at 18:34

## 2018-04-23 ASSESSMENT — ACTIVITIES OF DAILY LIVING (ADL)
DRESS: INDEPENDENT
GROOMING: INDEPENDENT
ORAL_HYGIENE: INDEPENDENT
LAUNDRY: WITH SUPERVISION

## 2018-04-23 NOTE — PLAN OF CARE
Problem: Cognitive Impairment (Psychotic Signs/Symptoms) (Adult)  Goal: Improved Thought Clarity/Organization (Psychotic Signs/Symptoms)  Outcome: Improving  Patient denied all psych symptoms when asked. However, she said that she is hospitalized, because she had awful feelings and thoughts (declined to talk about it).   Patient's goal is to have her medications adjusted, and she thinks it will be helpful for her. Patient thinks she will get better here in the hospital.   No concerns verbalized at this time.

## 2018-04-23 NOTE — TELEPHONE ENCOUNTER
RN left message for patient to return call to RN's direct line @ [phone].  Per provider, we are going to cancel her upcoming cluster immunotherapy appointments.  Patient needs to be following up with her psychiatrist and more stable before proceeding with allergy shots.    Nessa Sanchez RN

## 2018-04-23 NOTE — TELEPHONE ENCOUNTER
S - Pt. Bib family for decompensation and paranoid behavior  B - pmh/x of schizoaffective d/o bi polar , psychosis and depression , Pt. Reports inability to  concentrate or complete a thought, indecisive , unable to care for herself or 9 year old daughter, Pt. Having thoughts of self harm w/ plan to cut her nose, father not currently in the picture and pt. Has slight fixation on tereza father. In ED Standing during initial interview, Pt. described paranoid thinking feeling people are judging her and giving her dirty looks.Pt. also focused on her Apt. Not being clean enough , has not been sleeping , Pt. Has OP psychiatry and taking Geodon 40mg, U tox pending , Pt. Reports 2 previous MH IP Tx in 2013 and 2014, Pt. Has been in Day Tx in the past. No chronic health concerns reported, Pt. Medically cleared at [facility] ED  A - VOL   R - admit to [facility] / Dr. Plascencia accepting / Charge RN Randy Knight notified 1:33pm / [facility] EDDIE Clark notified 1:40pm

## 2018-04-23 NOTE — IP AVS SNAPSHOT
[address] [address]    Phone:  [phone]                                       After Visit Summary   4/23/2018    Maddy Ortiz    MRN: [id number]           After Visit Summary Signature Page     I have received my discharge instructions, and my questions have been answered. I have discussed any challenges I see with this plan with the nurse or doctor.    ..........................................................................................................................................  Patient/Patient Representative Signature      ..........................................................................................................................................  Patient Representative Print Name and Relationship to Patient    ..................................................               ................................................  Date                                            Time    ..........................................................................................................................................  Reviewed by Signature/Title    ...................................................              ..............................................  Date                                                            Time

## 2018-04-23 NOTE — ED NOTES
"Pt arrived with friend her and 9 yr old daughter.  Pt sees Dr Rodriguez, states \" I needs my meds adjusted, I just don't feel safe, the people at my apartment building give me dirty looks, \" pt did not call Dr Rodriguez's office, pt is hesitant in her story, looks at her friend to see what she should say.  States \" At times I think about cutting my nose\"  "

## 2018-04-23 NOTE — PROGRESS NOTES
"Admission Note:  S. Patient admitted from [facility] as voluntary. Reason for admission as per patient: \"I was having awful thoughts and feelings\" (she did not want to talk more about it). Per report, patient had made statements that she feels she wants to cut her own nose, and was feeling paranoid about the way people were looking at her.     B. Patient admits to a history of bipolar disorder and paranoia. She said she has been taking her medications. Patient denied suicide attempts or thoughts if killing self, denied family history of suicide. Patient denied history of self injurious behaviors. No history of abuse.  She lives with her 8 yo daughter, who currently is staying with patient's parents. Patient has a boy friend, Jay Jay. Patient works as a home health aid, and said that's he has some financial problems, and that she needs to  more hours at work.   Patient admits to 5 prior [facility] admission. Denied use of alcohol, street or prescription drugs, or tobacco.     A. Patient denied having suicidal or SIB thoughts at this time, denied wishing to be dead. She denied depression, anxiety, hallucinations, or paranoia. Reported occasional sleep problems, but said she is generally sleeping ok.   Patient denied pain and all other physical issues. Denied history of seizures or falls. Patient has positive attitude and thinks she will get better here.   Patient is pleasant on approach. Kept good eye contact. Thoughts were lineal and logical. Speech fluent and organized. Affect was appropriate to the situation, and somewhat blunted.     R. Patient's goal is to have her medications adjusted, as she has been taking the same medications for a long time, and feels that they maybe not working as before.       "

## 2018-04-23 NOTE — IP AVS SNAPSHOT
MRN:[id number]                      After Visit Summary   4/23/2018    Maddy Ortiz    MRN: [id number]           Thank you!     Thank you for choosing [facility] for your care. Our goal is always to provide you with excellent care.        Patient Information     Date Of Birth          1985        Designated Caregiver       Most Recent Value    Caregiver    Will someone help with your care after discharge? yes    Name of designated caregiver Jay Jay coats    Phone number of caregiver [phone]    Caregiver address [place]      About your hospital stay     You were admitted on:  April 23, 2018 You last received care in the:  [facility]    You were discharged on:  May 3, 2018       Who to Call     For medical emergencies, please call 911.  For non-urgent questions about your medical care, please call your primary care provider or clinic, [phone]          Attending Provider     Provider Specialty    Marian Plascencia MD Psychiatry       Primary Care Provider Office Phone # Fax #    [facility] [phone]      Your next 10 appointments already scheduled     May 15, 2018  7:45 AM CDT   (Arrive by 7:30 AM)   New Visit with PHILL Kelley [facility] ([facility])    [address] [facility] [address] [phone]              Additional Services     BEHAVIORAL HEALTH OUTPATIENT PROGRAM       Your provider has referred you to the Adult Mental Health Outpatient Program.    Order Diagnostic Assessment: Assess and Treat. Program placement will be determined by the     Provider recommendation to consider placement for (select one or more programs): Day Treatment: Specializes in multiple tracks grouped by Diagnosis, Age, Onset and Current Level of Functioning (3 days/week)    Is it clinically necessary for the Diagnostic Assessment to be completed prior to the patient's discharge from the hospital: Yes   Patient has history of decompensating quickly after discharge      If you have questions about this referral,  please contact Behavioral Access at: [phone]    Programs are all located at:  [facility] [place] [address], [address] [address]    Please be aware that coverage of these services is subject to the terms and limitations of your health insurance plan.  Call member services at your health plan with any benefit or coverage questions.      Please bring the following with you to your appointment:      (1) List of current medications   (2) This referral request   (3) Any documents/labs given to you for this referral            BEHAVIORAL HEALTH OUTPATIENT PROGRAM       Your provider has referred you to the Adult Mental Health Outpatient Program.    Order Diagnostic Assessment: Assess and Treat. Program placement will be determined by the     Provider recommendation to consider placement for (select one or more programs): Day Treatment: Specializes in multiple tracks grouped by Diagnosis, Age, Onset and Current Level of Functioning (3 days/week)    Is it clinically necessary for the Diagnostic Assessment to be completed prior to the patient's discharge from the hospital: Yes  Patient has history of decompensating quickly after discharge      If you have questions about this referral,  please contact Behavioral Access at: [phone]    Programs are all located at:  [facility] [place] [address], [address] [address]    Please be aware that coverage of these services is subject to the terms and limitations of your health insurance plan.  Call member services at your health plan with any benefit or coverage questions.      Please bring the following with you to your appointment:      (1) List of current medications   (2) This referral request    (3) Any documents/labs given to you for this referral                  Further instructions from your care team        Behavioral Discharge Planning and Instructions      Summary:  You were admitted on 4/23/2018  due to Psychotic Symptomology.  You were treated by Dr. Marian Plascencia MD and discharged on 5/3/18 from [facility]   to Home      Principal Diagnosis: Schizoaffective      Health Care Follow-up Appointments:     Day Treatment Monday, Tuesday, Thursday 1-4pm   [facility] Day Treatment Located in [facility] Floor [address] ; [facility] [address] [place] [address] Phone:[phone]  Rides: You will be picked up at your home at 12pm by [employer] [phone] on Monday, Tuesday, Thursday  Any questions regarding rides or to schedule additional medical rides call [phone]    Date/Time: Friday May 18th 1:30pm    Provider: Dr. Rachid Rodriguez   Address: [facility]  [phone]  Fax: [phone]    Attend all scheduled appointments with your outpatient providers. Call at least 24 hours in advance if you need to reschedule an appointment to ensure continued access to your outpatient providers.   Major Treatments, Procedures and Findings:  You were provided with: a psychiatric assessment, medication evaluation and/or management and milieu management    Symptoms to Report: feeling more aggressive, increased confusion, mood getting worse or thoughts of suicide    Early warning signs can include: increased depression or anxiety increased thoughts or behaviors of suicide or self-harm  increased unusual thinking, such as paranoia or hearing voices    Safety and Wellness:  Take all medicines as directed.  Make no changes unless your doctor suggests them.      Follow treatment recommendations.  Refrain from alcohol and non-prescribed drugs.  If there is a concern for safety, call 511.    Resources:   Crisis  "Intervention: 298.531.5542 or 650-364-8457 (TTY: 961.277.1512).  Call anytime for help.  National [place] on Mental Illness (www.[place].vincent.org): 353.538.7809 or 977-904-9400.  National Suicide Prevention Line (www.mentalhealthmn.org): 459-433-RNXZ (6234)  Mental Health Consumer/Survivor Network of [place] (www.mhcsn.net): 953.869.9221 or 063-103-8950  Mental Health Association of [place] (www.mentalhealth.org): 928.222.3747 or 694-706-6856  Self- Management and Recovery Training., SMART-- Toll free: 811.372.3788  www.[place]  [facility] Crisis (COPE) Response - Adult 795 684-6170  Text 4 Life: txt \"LIFE\" to 98269 for immediate support and crisis intervention  Crisis text line: Text \"[place]\" to 531369. Free, confidential, 24/7.  Crisis Intervention: 541.662.1258 or 609-507-3873. Call anytime for help.   [facility] Mental Health Crisis Team - Child: [phone]    The treatment team has appreciated the opportunity to work with you.     If you have any questions or concerns our unit number is [phone]  You may be receiving a follow-up phone call within the next three days from a representative from behavioral health.            Pending Results     No orders found from 4/21/2018 to 4/24/2018.            Statement of Approval     Ordered          05/03/18 1236  I have reviewed and agree with all the recommendations and orders detailed in this document.  EFFECTIVE NOW     Approved and electronically signed by:  Jay Jay Dumont MD             Admission Information     Date & Time Provider Department Dept. Phone    4/23/2018 Marian Plascencia MD [facility] [phone]      Your Vitals Were     Blood Pressure Pulse Temperature Respirations Height Weight    108/71 86 97.4  F (36.3  C) (Tympanic) 16 1.6 m (5' 3\") 87.1 kg (192 lb)    Pulse Oximetry BMI (Body Mass Index)                97% 34.01 kg/m2          MyChart Information     MyChart lets you send messages to your doctor, view your test results, renew your " "prescriptions, schedule appointments and more. To sign up, go to [email or web address] . Click on \"Log in\" on the left side of the screen, which will take you to the Welcome page. Then click on \"Sign up Now\" on the right side of the page.     You will be asked to enter the access code listed below, as well as some personal information. Please follow the directions to create your username and password.     Your access code is: [email or web address]  Expires: 2018  3:10 PM     Your access code will  in 90 days. If you need help or a new code, please call your [facility] clinic or [phone].        Care EveryWhere ID     This is your Care EveryWhere ID. This could be used by other organizations to access your [facility] medical records  [id number]        Equal Access to Services     ARMOND MCKEON : Kamryn Ackerman, pancho rondon, sally solano, lincoln maciel . So [facility] [phone].    ATENCIÓN: Si habla español, tiene a titus disposición servicios gratuitos de asistencia lingüística. Yadira al [phone].    We comply with applicable federal civil rights laws and [place] laws. We do not discriminate on the basis of race, color, national origin, age, disability, sex, sexual orientation, or gender identity.               Review of your medicines      START taking        Dose / Directions    clonazePAM 0.5 MG tablet   Commonly known as:  klonoPIN   Used for:  Depression with anxiety        Dose:  0.5 mg   Take 1 tablet (0.5 mg) by mouth 2 times daily as needed for anxiety   Quantity:  60 tablet   Refills:  0       haloperidol 1 MG tablet   Commonly known as:  HALDOL        Dose:  7 mg   Take 7 tablets (7 mg) by mouth daily (with dinner)   Quantity:  210 tablet   Refills:  0       hydrOXYzine 25 MG tablet   Commonly known as:  ATARAX   Used for:  Depression with anxiety        Dose:  25 mg   Take 1 tablet (25 mg) by mouth every 4 hours as needed for anxiety "   Quantity:  120 tablet   Refills:  0       melatonin 3 MG tablet   Used for:  Insomnia, unspecified type        Dose:  3 mg   Take 1 tablet (3 mg) by mouth nightly as needed for sleep   Quantity:  30 tablet   Refills:  0       traZODone 50 MG tablet   Commonly known as:  DESYREL   Used for:  Insomnia, unspecified type        Dose:  50 mg   Take 1 tablet (50 mg) by mouth every evening as needed for sleep   Quantity:  30 tablet   Refills:  0         CONTINUE these medicines which may have CHANGED, or have new prescriptions. If we are uncertain of the size of tablets/capsules you have at home, strength may be listed as something that might have changed.        Dose / Directions    albuterol 108 (90 Base) MCG/ACT Inhaler   Commonly known as:  PROAIR HFA/PROVENTIL HFA/VENTOLIN HFA   This may have changed:  reasons to take this   Used for:  Chronic seasonal allergic rhinitis due to pollen        Dose:  2 puff   Inhale 2 puffs into the lungs every 4 hours as needed for wheezing or shortness of breath / dyspnea   Quantity:  1 Inhaler   Refills:  0       cetirizine 10 MG tablet   Commonly known as:  zyrTEC   This may have changed:    - when to take this  - reasons to take this   Used for:  Chronic seasonal allergic rhinitis due to pollen        Dose:  10 mg   Take 1 tablet (10 mg) by mouth daily as needed for allergies   Quantity:  30 tablet   Refills:  0       fluticasone 50 MCG/ACT spray   Commonly known as:  FLONASE   This may have changed:    - when to take this  - reasons to take this   Used for:  Chronic seasonal allergic rhinitis due to pollen        Dose:  1-2 spray   Spray 1-2 sprays into both nostrils daily as needed for rhinitis   Quantity:  1 Bottle   Refills:  0         CONTINUE these medicines which have NOT CHANGED        Dose / Directions    * ALLERGEN IMMUNOTHERAPY PRESCRIPTION   Used for:  Allergic rhinitis due to animal dander, Allergic rhinitis due to dust mite        Cat Hair, Standardized 10,000  BAU/mL, ALK  3.0 ml Dog Hair Dander, A. P.  1:100 w/v, HS  1.0 ml Dust Mites F 30,000AU/mL, HS  0.5 ml Dust Mites P. 30,000 AU/mL, HS  0.5 ml  Diluent: HSA qs to 5ml   Quantity:  5 mL   Refills:  PRN       * ALLERGEN IMMUNOTHERAPY PRESCRIPTION   Used for:  Allergic rhinitis due to mold        Alternaria Tenuis 1:10 w/v, HS  0.5 ml Epicoccum Nigrum 1:10 w/v, HS 0.5 ml Hormodendrum Cladosporioides 1:10 w/v, HS 0.5 ml Diluent: HSA qs to 5ml   Quantity:  5 mL   Refills:  PRN       * ALLERGEN IMMUNOTHERAPY PRESCRIPTION   Used for:  Chronic seasonal allergic rhinitis due to pollen        Estuardo, White  1:20 w/v, HS  0.5 ml Birch Mix PRW 1:20 w/v, HS  0.5 ml Elm, American 1:20 w/v, HS  0.5 ml Hackberry 1:20 w/v, HS 0.5 ml Hickory, Shagbark 1:20 w/v, HS  0.5 ml [place] Mix RW 1:20 w/v, HS 0.5 ml Oak Mix RVW 1:20 w/v, HS 0.5 ml [place] Tree, Black 1:20 w/v, HS 0.5 ml [place], Black 1:20 w/v, HS 0.5 ml Diluent: HSA qs to 5ml   Quantity:  5 mL   Refills:  PRN       * ALLERGEN IMMUNOTHERAPY PRESCRIPTION   Used for:  Chronic seasonal allergic rhinitis due to pollen        Kochia 1:20 w/v, HS 1.0 ml Nettle 1:20 w/v, HS 1.0 ml Plantain, English 1:20 w/v, HS 1.0 ml Ragweed Mixed 1:20 w/v ALK  0.6 ml Russian Thistle 1:20 w/v, HS 1.0 ml Diluent: HSA qs to 5ml   Quantity:  5 mL   Refills:  PRN       cyclobenzaprine 10 MG tablet   Commonly known as:  FLEXERIL   Used for:  Sacral back pain        Dose:  5-10 mg   Take 0.5-1 tablets (5-10 mg) by mouth At Bedtime   Quantity:  30 tablet   Refills:  0       EPINEPHrine 0.3 MG/0.3ML injection 2-pack   Commonly known as:  EPIPEN 2-ODETTE   Used for:  Need for desensitization to allergens        Dose:  0.3 mg   Inject 0.3 mLs (0.3 mg) into the muscle once as needed for anaphylaxis   Quantity:  0.6 mL   Refills:  3       levonorgestrel 20 MCG/24HR IUD   Commonly known as:  MIRENA   Used for:  Encounter for insertion of mirena IUD        Dose:  1 each   1 each (20 mcg) by Intrauterine route continuous    Refills:  0       permethrin 1 % Liqd   Used for:  Lice        Apply to clean, towel-dried hair, saturate hair and scalp, wash off after 10 min.   Quantity:  60 mL   Refills:  1       * Notice:  This list has 4 medication(s) that are the same as other medications prescribed for you. Read the directions carefully, and ask your doctor or other care provider to review them with you.      STOP taking     BENADRYL PO           ziprasidone 40 MG capsule   Commonly known as:  GEODON                Where to get your medicines      These medications were sent to [facility] [place], [place] - [address]  [address], [facility] [address]     Phone:  [phone]     albuterol 108 (90 Base) MCG/ACT Inhaler    cetirizine 10 MG tablet    fluticasone 50 MCG/ACT spray    haloperidol 1 MG tablet    hydrOXYzine 25 MG tablet    melatonin 3 MG tablet    traZODone 50 MG tablet         Some of these will need a paper prescription and others can be bought over the counter. Ask your nurse if you have questions.     Bring a paper prescription for each of these medications     clonazePAM 0.5 MG tablet                Protect others around you: Learn how to safely use, store and throw away your medicines at www.disposemymeds.org.             Medication List: This is a list of all your medications and when to take them. Check marks below indicate your daily home schedule. Keep this list as a reference.      Medications           Morning Afternoon Evening Bedtime As Needed    albuterol 108 (90 Base) MCG/ACT Inhaler   Commonly known as:  PROAIR HFA/PROVENTIL HFA/VENTOLIN HFA   Inhale 2 puffs into the lungs every 4 hours as needed for wheezing or shortness of breath / dyspnea                                * ALLERGEN IMMUNOTHERAPY PRESCRIPTION   Cat Hair, Standardized 10,000 BAU/mL, ALK  3.0 ml Dog Hair Dander, A. P.  1:100 w/v, HS  1.0 ml Dust Mites F 30,000AU/mL, HS  0.5 ml Dust Mites P. 30,000 AU/mL, HS  0.5  ml  Diluent: HSA qs to 5ml                                * ALLERGEN IMMUNOTHERAPY PRESCRIPTION   Alternaria Tenuis 1:10 w/v, HS  0.5 ml Epicoccum Nigrum 1:10 w/v, HS 0.5 ml Hormodendrum Cladosporioides 1:10 w/v, HS 0.5 ml Diluent: HSA qs to 5ml                                * ALLERGEN IMMUNOTHERAPY PRESCRIPTION   Estuardo, White  1:20 w/v, HS  0.5 ml Birch Mix PRW 1:20 w/v, HS  0.5 ml Elm, American 1:20 w/v, HS  0.5 ml Hackberry 1:20 w/v, HS 0.5 ml Hickory, Shagbark 1:20 w/v, HS  0.5 ml [place] Mix RW 1:20 w/v, HS 0.5 ml Oak Mix RVW 1:20 w/v, HS 0.5 ml [place] Tree, Black 1:20 w/v, HS 0.5 ml Welcome, Black 1:20 w/v, HS 0.5 ml Diluent: HSA qs to 5ml                                * ALLERGEN IMMUNOTHERAPY PRESCRIPTION   Kochia 1:20 w/v, HS 1.0 ml Nettle 1:20 w/v, HS 1.0 ml Plantain, English 1:20 w/v, HS 1.0 ml Ragweed Mixed 1:20 w/v ALK  0.6 ml Russian Thistle 1:20 w/v, HS 1.0 ml Diluent: HSA qs to 5ml                                cetirizine 10 MG tablet   Commonly known as:  zyrTEC   Take 1 tablet (10 mg) by mouth daily as needed for allergies   Last time this was given:  10 mg on 4/29/2018  4:54 PM                                clonazePAM 0.5 MG tablet   Commonly known as:  klonoPIN   Take 1 tablet (0.5 mg) by mouth 2 times daily as needed for anxiety   Last time this was given:  0.5 mg on 5/2/2018  6:45 PM                                cyclobenzaprine 10 MG tablet   Commonly known as:  FLEXERIL   Take 0.5-1 tablets (5-10 mg) by mouth At Bedtime                                EPINEPHrine 0.3 MG/0.3ML injection 2-pack   Commonly known as:  EPIPEN 2-ODETTE   Inject 0.3 mLs (0.3 mg) into the muscle once as needed for anaphylaxis                                fluticasone 50 MCG/ACT spray   Commonly known as:  FLONASE   Spray 1-2 sprays into both nostrils daily as needed for rhinitis                                haloperidol 1 MG tablet   Commonly known as:  HALDOL   Take 7 tablets (7 mg) by mouth daily (with dinner)    Last time this was given:  7 mg on 5/2/2018  6:44 PM                                hydrOXYzine 25 MG tablet   Commonly known as:  ATARAX   Take 1 tablet (25 mg) by mouth every 4 hours as needed for anxiety   Last time this was given:  25 mg on 4/29/2018  9:46 PM                                levonorgestrel 20 MCG/24HR IUD   Commonly known as:  MIRENA   1 each (20 mcg) by Intrauterine route continuous                                melatonin 3 MG tablet   Take 1 tablet (3 mg) by mouth nightly as needed for sleep                                permethrin 1 % Liqd   Apply to clean, towel-dried hair, saturate hair and scalp, wash off after 10 min.                                traZODone 50 MG tablet   Commonly known as:  DESYREL   Take 1 tablet (50 mg) by mouth every evening as needed for sleep   Last time this was given:  50 mg on 5/2/2018  7:26 PM                                * Notice:  This list has 4 medication(s) that are the same as other medications prescribed for you. Read the directions carefully, and ask your doctor or other care provider to review them with you.

## 2018-04-23 NOTE — ED PROVIDER NOTES
History     Chief Complaint   Patient presents with     Anxiety     pt seeing a pyschiatrist but unable to get into psychiatrist.  pt reports thoughts of cutting nose for self harm     HPI  Maddy Ortiz is a 32 year old female with past medical history which includes paranoid type delusional disorder, bipolar 1 disorder, insomnia, depression and anxiety who presents for evaluation of feeling helpless.  She explains that this morning she felt as though she she was helpless and incapable of performing her daily activities.  She admits that she is taking her prescription medication Geodon as written without changes in the past 2 years.  Patient has not seen her psychiatrist since taking classes at the [employer] but has since discontinued schooling.  She also has an outpatient appointment for psychology intake next week but has not met this person before.  Patient has thoughts of cutting herself including her nose but denies suicidal ideation or plan.  No recent fever/illness, injury, alcohol or illicit drug use.    Problem List:    Patient Active Problem List    Diagnosis Date Noted     Encounter for IUD insertion 09/15/2017     Priority: Medium     inserted 9/15/17 by Krista Keller MD    LOT: [id number]  Exp: 04/20       Seasonal allergic rhinitis due to pollen 11/04/2016     Priority: Medium     Allergic rhinitis due to mold 11/04/2016     Priority: Medium     Allergic rhinitis due to animal dander 11/04/2016     Priority: Medium     Allergic rhinitis due to dust mite 11/04/2016     Priority: Medium     Depression with anxiety 01/26/2015     Priority: Medium     Health Care Home 11/21/2013     Priority: Medium     State Tier Level:    Status:  Closed 2/12/14  Care Coordinator:  Kat Tripp               Insomnia 07/18/2013     Priority: Medium     Bipolar 1 disorder (H) 12/06/2012     Priority: Medium     Planning on seeing Purvi (psychiatric nurse)       Paranoid type delusional disorder (H) 11/14/2012      "Priority: Medium     Psychosis 10/16/2012     Priority: Medium     Animal dander allergy 05/09/2012     Priority: Medium     Dog and Cat       CARDIOVASCULAR SCREENING; LDL GOAL LESS THAN 160 05/09/2012     Priority: Medium        Past Medical History:    Past Medical History:   Diagnosis Date     Bipolar 1 disorder (H) 12/6/2012     Paranoid type delusional disorder (H) 11/14/2012       Past Surgical History:    Past Surgical History:   Procedure Laterality Date     TONSILLECTOMY & ADENOIDECTOMY      as a child       Family History:    Family History   Problem Relation Age of Onset     Adopted: Yes     Unknown/Adopted Mother      Unknown/Adopted Father      Unknown/Adopted Other        Social History:  Marital Status:  Single [1]  Social History   Substance Use Topics     Smoking status: Never Smoker     Smokeless tobacco: Never Used     Alcohol use Yes      Comment: rare wine ( very rare)        Medications:      albuterol (PROAIR HFA/PROVENTIL HFA/VENTOLIN HFA) 108 (90 BASE) MCG/ACT Inhaler   cetirizine (ZYRTEC) 10 MG tablet   cyclobenzaprine (FLEXERIL) 10 MG tablet   DiphenhydrAMINE HCl (BENADRYL PO)   EPINEPHrine (EPIPEN 2-ODETTE) 0.3 MG/0.3ML injection   fluticasone (FLONASE) 50 MCG/ACT spray   levonorgestrel (MIRENA) 20 MCG/24HR IUD   ORDER FOR ALLERGEN IMMUNOTHERAPY   ORDER FOR ALLERGEN IMMUNOTHERAPY   ORDER FOR ALLERGEN IMMUNOTHERAPY   ORDER FOR ALLERGEN IMMUNOTHERAPY   permethrin 1 % LIQD   ziprasidone (GEODON) 40 MG capsule         Review of Systems  Constitutional:  Negative for fever or recent illness.  Cardiovascular:  Negative for chest pain.  Respiratory:  Negative for shortness of breath.  Gastrointestinal:  Negative for abdominal pain, nausea, or vomiting.  Musculoskeletal:  Negative for recent injuries.    All others reviewed and are negative.      Physical Exam   BP: 125/89  Heart Rate: 91  Temp: 98  F (36.7  C)  Resp: 18  Height: 160 cm (5' 3\")  Weight: 86.2 kg (190 lb)  SpO2: 97 %      Physical " Exam  Constitutional:  Well developed, well nourished.  Appears nontoxic but anxious pacing the room.  HENT:  Normocephalic and atraumatic.  Symmetric in appearance.  Eyes:  Conjunctivae are normal.  Neck:  Neck supple.  Cardiovascular:  No cyanosis.  RRR.  No audible murmurs noted.    Respiratory:  Effort normal, no respiratory distress.  CTAB without diminished regions.  No wheezing, rhonchi, or crackles.  Gastrointestinal:  Soft, nondistended abdomen.  Nontender and without guarding.  No rigidity or rebound tenderness.  Negative Alex's sign.    Musculoskeletal:  Moves extremities spontaneously.  Neurological:  Patient is alert.  Skin:  Skin is warm and dry.  Psych:  Patient does not show signs of confusion or intoxication.  Appears moderately anxious but not agitated or threatening.  Displays rambling speech and routinely looks to significant other to answer direct questions.  Admits to thoughts of self-harm but denies suicidal ideation or intent.       ED Course     ED Course     Procedures              Critical Care time:  none               Results for orders placed or performed during the hospital encounter of 04/23/18 (from the past 24 hour(s))   Drug abuse screen 77 urine ([place],[facility])   Result Value Ref Range    Amphetamine Qual Urine Negative NEG^Negative    Barbiturates Qual Urine Negative NEG^Negative    Benzodiazepine Qual Urine Negative NEG^Negative    Cannabinoids Qual Urine Negative NEG^Negative    Cocaine Qual Urine Negative NEG^Negative    Opiates Qualitative Urine Negative NEG^Negative    PCP Qual Urine Negative NEG^Negative   HCG qualitative urine (UPT)   Result Value Ref Range    HCG Qual Urine Negative NEG^Negative   CBC with platelets differential   Result Value Ref Range    WBC 14.1 (H) 4.0 - 11.0 10e9/L    RBC Count 4.70 3.8 - 5.2 10e12/L    Hemoglobin 14.0 11.7 - 15.7 g/dL    Hematocrit 41.0 35.0 - 47.0 %    MCV 87 78 - 100 fl    MCH 29.8 26.5 - 33.0 pg    MCHC 34.1 31.5 - 36.5 g/dL    RDW  "11.9 10.0 - 15.0 %    Platelet Count 245 150 - 450 10e9/L    Diff Method Automated Method     % Neutrophils 75.5 %    % Lymphocytes 16.8 %    % Monocytes 5.9 %    % Eosinophils 1.2 %    % Basophils 0.3 %    % Immature Granulocytes 0.3 %    Absolute Neutrophil 10.7 (H) 1.6 - 8.3 10e9/L    Absolute Lymphocytes 2.4 0.8 - 5.3 10e9/L    Absolute Monocytes 0.8 0.0 - 1.3 10e9/L    Absolute Eosinophils 0.2 0.0 - 0.7 10e9/L    Absolute Basophils 0.0 0.0 - 0.2 10e9/L    Abs Immature Granulocytes 0.0 0 - 0.4 10e9/L       Medications - No data to display    Assessments & Plan (with Medical Decision Making)   Maddy Ortiz is a 32 year old female who presented to the department with significant other requesting help for mental health and psychiatric medications.  She has been taking the same dose of her Geodon for \"a couple of years\" without change but does not follow with psychology or counseling.  Comorbidities include past diagnosis of bipolar disorder and paranoid type delusional disorder.  She denies suicidal intent but is having a difficult time answering questions directly and appears somewhat paranoid at this time.  She was independently interviewed by DEC  Lloyd who also has concerns and recommends transfer to inpatient psychiatric facility for evaluation and management planning.  The patient has no signs of injury or infectious symptoms.  Both she and accompanying significant other have been informed of this recommendation and are in agreement with plan for transfer to [place].        Disclaimer:  This note consists of symbols derived from keyboarding, dictation, and/or voice recognition software.  As a result, there may be errors in the script that have gone undetected.  Please consider this when interpreting information found in the chart.        I have reviewed the nursing notes.    I have reviewed the findings, diagnosis, plan and need for follow up with the patient.       New Prescriptions    No " medications on file       Final diagnoses:   Anxiety   Thoughts of self harm       4/23/2018   [facility] EMERGENCY DEPARTMENT     Harshad Torres DO  04/23/18 [id number]

## 2018-04-23 NOTE — PROGRESS NOTES
04/23/18 1636   Patient Belongings   Did you bring any home meds/supplements to the hospital?  Yes   Disposition of meds  Sent to security/pharmacy per site process   Patient Belongings contacts;shoes;clothing;cell phone/electronics   Disposition of Belongings Kept with patient;Locker;Sent to security per site process   Belongings Search Yes   Clothing Search Yes   Second Staff Sandra KURTZ     Belongings: Contacts + case + solution, 1 key, playing cards, 2 pens, charging port, sunglasses language dictionary's, headphones, Iphone, stripped leggings, piece of mail, calender book, yellow pages book, 2 dvd's, paint brush, metal brief case w/ papers and Ipad, dress, gray cardigan     Security: Epi pen, Diphenhydramine, Equate (allergy relief)         Items brought in and searched on 4/26/18  Black dress, grey sweatpants, black pants x2, red pants, white tank top, pink t-shirt, black bra x2, blue bra, socks x3, underwear x4, DVD, various toiletries, weekender bag             A               Admission:  I am responsible for any personal items that are not sent to the safe or pharmacy.  [facility] is not responsible for loss, theft or damage of any property in my possession.    Signature:  _________________________________ Date: _______  Time: _____                                              Staff Signature:  ____________________________ Date: ________  Time: _____      2nd Staff person, if patient is unable/unwilling to sign:    Signature: ________________________________ Date: ________  Time: _____     Discharge:  [facility] has returned all of my personal belongings:    Signature: _________________________________ Date: ________  Time: _____                                          Staff Signature:  ____________________________ Date: ________  Time: _____

## 2018-04-24 PROCEDURE — 25000132 ZZH RX MED GY IP 250 OP 250 PS 637

## 2018-04-24 PROCEDURE — 99223 1ST HOSP IP/OBS HIGH 75: CPT | Mod: AI | Performed by: PSYCHIATRY & NEUROLOGY

## 2018-04-24 PROCEDURE — 93005 ELECTROCARDIOGRAM TRACING: CPT

## 2018-04-24 PROCEDURE — 12400007 ZZH R&B MH INTERMEDIATE UMMC

## 2018-04-24 RX ORDER — HALOPERIDOL 2 MG/1
2 TABLET ORAL AT BEDTIME
Status: DISCONTINUED | OUTPATIENT
Start: 2018-04-24 | End: 2018-04-25

## 2018-04-24 RX ORDER — FLUTICASONE PROPIONATE 50 MCG
1-2 SPRAY, SUSPENSION (ML) NASAL DAILY PRN
Status: DISCONTINUED | OUTPATIENT
Start: 2018-04-24 | End: 2018-05-03 | Stop reason: HOSPADM

## 2018-04-24 RX ADMIN — HYDROXYZINE HYDROCHLORIDE 25 MG: 25 TABLET ORAL at 13:53

## 2018-04-24 RX ADMIN — HALOPERIDOL 2 MG: 2 TABLET ORAL at 20:44

## 2018-04-24 RX ADMIN — CETIRIZINE HYDROCHLORIDE 10 MG: 10 TABLET, FILM COATED ORAL at 20:41

## 2018-04-24 RX ADMIN — HYDROXYZINE HYDROCHLORIDE 25 MG: 25 TABLET ORAL at 20:41

## 2018-04-24 ASSESSMENT — ACTIVITIES OF DAILY LIVING (ADL)
LAUNDRY: WITH SUPERVISION
HYGIENE/GROOMING: INDEPENDENT
GROOMING: INDEPENDENT
DRESS: SCRUBS (BEHAVIORAL HEALTH)
ORAL_HYGIENE: INDEPENDENT
ORAL_HYGIENE: INDEPENDENT
LAUNDRY: WITH SUPERVISION
DRESS: INDEPENDENT

## 2018-04-24 NOTE — PROGRESS NOTES
Pt woke at 0330 and remained awake for the rest of night shift.  Pt asked for a snack of applesauce and then returned to room.  Pt came to the nurse's desk to ask permission to use a different toilet as her roommate was praying and Pt did not wish to disrespect her Muslim.  Writer informed Pt this was only allowed in case of emergency and that the private bathroom in her room was hers to use.  Pt reluctantly accepted this and returned to her room to use the restroom.

## 2018-04-24 NOTE — PROVIDER NOTIFICATION
04/24/18 1300   Behavioral Health   Thoughts/Cognition (WDL) ex   Hallucinations denies / not responding to hallucinations   Thinking distractable   Orientation time: oriented;date: oriented;place: oriented;person: oriented   Memory baseline memory   Insight admits / accepts;insight appropriate to situation;insight appropriate to events   Judgement impaired   Eye Contact at examiner   Affect/Mood (WDL) WDL   Affect full range affect   Mood anxious   ADL Assessment (WDL) WDL   Physical Appearance/Attire attire appropriate to age and situation;neat;appears stated age   Hygiene well groomed   Suicidality (WDL) WDL   Suicidality other (see comments)  (denies)   1. Wish to be Dead No   Wish to be Dead Description n/a   2. Non-Specific Active Suicidal Thoughts  No   Non-Specific Active Suicidal Thought Description n/a   3. Active Sucidal Ideation with any Methods (Not Plan) Without Intent to Act  No   Active Suicidal Ideation with any Methods (Not Plan) Description  n/a   4. Active Suicidal Ideation with Some Intent to Act, Without Specific Plan  No   Active Suicidal Ideation with Some Intent to Act, Without Specific Plan Description  n/a   5. Active Suicidal Ideation with Specific Plan and Intent  No   Active Suicidal Ideation with Specific Plan and Intent Description  n/a   Duration (Lifetime) NA   Change in Protective Factors? No   Enviromental Risk Factors None   Self Injury other (see comment)  (denies)   Elopement (WDL) WDL   Activity (WDL) Ex   Activity isolative   Speech (WDL) WDL   Speech coherent;clear   Medication Sensitivity (WDL) WDL   Medication Sensitivity no observed side effects;no stated side effects   Psychomotor Gait (WDL) WDL   Psychomotor / Gait balanced;steady   Overt Agression (WDL) WDL   Substance Withdrawal   Substance Withdrawal None   Ren Risk Assessment   Sensory Perception 4-->no impairment   Moisture 4-->rarely moist   Activity 4-->walks frequently   Mobility 4-->no limitation  "  Nutrition 3-->adequate   Friction and Shear 3-->no apparent problem   Ren Score 22   Safety   Suicidality Status 15   Fall Assessment   Get up and Go Test 0 - pushes up, successful in 1 attempt   Fall Risk Interventions   High fall risk medications prescribed? no   Psycho Education   Type of Intervention 1:1 intervention   Response participates, initiates socially appropriate   Hours 0.5   Treatment Detail check-in   Daily Care   Activity up ad mauricio   Activities of Daily Living   Hygiene/Grooming independent   Oral Hygiene independent   Dress independent   Laundry with supervision   Room Organization independent   Activity   Activity Assistance Provided independent   Hygiene Care Assistance   Hygiene Assistance per patient   Discharge - Next Level of Care   Continuing Care information sent Yes     I met with the patient for 1:1 interview and the patient reported that she came to the hospital because \" I wasn't feeling well. I felt a lot of fear\". Patient told me that she was very fearful that she would do something intentionally to hurt herself. She reported a history of depression and anxiety. Patient denied having depressive episodes during the interview but reported increasing anxiety (7/10). She denied SI/SIB/HI thoughts, denied having paranoid and racing thoughts, denied having psychotic symptoms. Patient reported that her current medications are not working saying that \" I plan to taper off my current medications and start new once\". Patient's hygiene is good, reported good appetite, denied physical and emotional pain. Reported that she wants to be here to get help. She has been following directions in the unit. Patient denied having any concerns. Staff will continue to monitor.  "
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
## 2018-04-25 LAB — INTERPRETATION ECG - MUSE: NORMAL

## 2018-04-25 PROCEDURE — 12400007 ZZH R&B MH INTERMEDIATE UMMC

## 2018-04-25 PROCEDURE — 25000132 ZZH RX MED GY IP 250 OP 250 PS 637

## 2018-04-25 PROCEDURE — 99232 SBSQ HOSP IP/OBS MODERATE 35: CPT | Mod: GC | Performed by: PSYCHIATRY & NEUROLOGY

## 2018-04-25 RX ORDER — HALOPERIDOL 5 MG/1
5 TABLET ORAL AT BEDTIME
Status: DISCONTINUED | OUTPATIENT
Start: 2018-04-25 | End: 2018-04-27

## 2018-04-25 RX ORDER — TRAZODONE HYDROCHLORIDE 50 MG/1
50 TABLET, FILM COATED ORAL
Status: DISCONTINUED | OUTPATIENT
Start: 2018-04-25 | End: 2018-05-01

## 2018-04-25 RX ADMIN — HYDROXYZINE HYDROCHLORIDE 25 MG: 25 TABLET ORAL at 10:55

## 2018-04-25 RX ADMIN — HALOPERIDOL 5 MG: 5 TABLET ORAL at 20:25

## 2018-04-25 RX ADMIN — CETIRIZINE HYDROCHLORIDE 10 MG: 10 TABLET, FILM COATED ORAL at 20:24

## 2018-04-25 RX ADMIN — HYDROXYZINE HYDROCHLORIDE 25 MG: 25 TABLET ORAL at 20:25

## 2018-04-25 ASSESSMENT — ACTIVITIES OF DAILY LIVING (ADL)
ORAL_HYGIENE: INDEPENDENT
DRESS: INDEPENDENT
LAUNDRY: WITH SUPERVISION
HYGIENE/GROOMING: INDEPENDENT

## 2018-04-25 NOTE — PROGRESS NOTES
Sleeping nearly all day. Appears anxious and agitated when awake. Rapid gait. Keeps to self.          04/25/18 1442   Behavioral Health   Thinking distractable;delusional;paranoid;poor concentration   Orientation situation, disoriented;person: oriented   Insight poor;denial of illness   Judgement impaired   Eye Contact at examiner   Affect tense;irritable   Mood labile;irritable   Physical Appearance/Attire attire appropriate to age and situation   Hygiene well groomed   1. Wish to be Dead No   2. Non-Specific Active Suicidal Thoughts  No   Speech pressured;clear;rambling   Safety   Suicidality Status 15

## 2018-04-25 NOTE — PROGRESS NOTES
"Patient presented as labile and somewhat anxious upon approach this shift. Patient's thinking initially appeared to be intact, but patient was overheard making a number of odd statements on the phone, requesting that the person on the other line change their shirt because \"I can't be independent when you wear that shirt, it prevents me from being independent\" and stating that she felt her medications were also preventing her from becoming independent. Patient was observed eating supper on the floor in her room this evening.  Patient was visible in the milieu at times this evening but socialized minimally with peers. Patient made no complaints of physical discomfort or emotional distress and currently bed rests. Will continue to monitor closely and assess.       04/24/18 0550   Behavioral Health   Hallucinations denies / not responding to hallucinations   Thinking distractable;other (see comment)  (anne if delusional, made some odd comments)   Orientation person: oriented;place: oriented;date: oriented;time: oriented   Memory other (see comment)  (anne (unaware of baseline))   Insight poor   Judgement impaired   Eye Contact at examiner   Affect irritable   Mood labile;anxious   Physical Appearance/Attire appears stated age;attire appropriate to age and situation   Hygiene well groomed   Suicidality other (see comments)  (none endorsed or observed)   1. Wish to be Dead No   2. Non-Specific Active Suicidal Thoughts  No   Change in Protective Factors? No   Enviromental Risk Factors None   Self Injury other (see comment)  (none endorsed or observed)   Elopement (none observed this shift)   Activity isolative;other (see comment)  (visible in milieu, but socialized minimally)   Speech clear;coherent   Medication Sensitivity no stated side effects;no observed side effects   Psychomotor / Gait balanced;steady   Activities of Daily Living   Hygiene/Grooming independent   Oral Hygiene independent   Dress scrubs (behavioral " health)   Laundespinoza with supervision   Room Organization independent

## 2018-04-25 NOTE — PROGRESS NOTES
"  ----------------------------------------------------------------------------------------------------------  [facility], [place]   Psychiatric Progress Note  Hospital Day #2     Interim History:   The patient's care was discussed with the treatment team and chart notes were reviewed.  Sleep: 5.5  PRNs: Vistaril x2    Staff report: Overheard making odd comments on the phone such as \"you need to change your shirt. I can't be independent when you wear the shirt, it's preventing me from being independent.\" Was observed to be eating her dinner on the floor of her room.     Patient interview: Patient was seen in the AM in the conference room. She is pleasant and cooperative.    Says she is going through \"withdrawal\" from the geodon. Says she has \"chills\" and feels anxious. Says \"time\" will help and says this has happened before with withdrawal. Says her mood is \"good.\" Talked with her family and bf yesterday and when asked how the conversations went she said, \"I don't know how to say.\" When asked to clarify she says her bf was upset that she wants to go on disability and says he said he has a \"high bar\" for her. Asked about her dropping out of school and she says it was \"the best thing for my family care.\" Thinks she was overwhelmed by school and managing family life. Wants to take good care of Leonora. Says she wants to wait to go back to school until her debt is better managed. Says she is still feeling dread. Asked about her IVF plans and her bf sharing her desire to have a red headed child on the 4th of July. Says her GF bday was on the 4th of July and says, \"he was a good man.\" Would like this to be a \"family tradition.\" Also shares that her bf has two nephews that have red hair which would also be a family tradition. Shares that she would like to have a wedding on Saturday, July 4th, 2020 after giving birth on July 4th, 2019, which would make Leonora 11 and her other children 2 yo. Says " "\"this is consistent with my [employer] education.\" Asked her about giving away money to neighbors and she says, \"well I asked for financial aid. I went to the student-parent center and they said I couldn't get  assistance. I had to pay out of pocket.\" Asked her to clarify how this relates to her neighbors and says, \"I had to pay $100/mo for babysitting.\" Again asks how this relates and she says that her neighbors were caring for Leonora. Says she has been avoiding her neighborhood because \"I didn't agree with their care.\" When asked to clarify she says, \"it was negative care. It was negative care when she needs positive care I see in other people.\" She says she made a document on [email or web address] detailing her unhappiness with the care of her daughter and says, \"they got agree with me, they didn't agree with my opinions.\" Is unable to say how they found out about the document or knew how she felt. Is amenable to increasing haldol. Has not gone to groups because of the withdrawal.       The risks, benefits, alternatives and side effects of any medication changes have been discussed and are understood by the patient and other caregivers.         Psychiatric Examination:   BP (!) 145/95  Pulse 104  Temp 99  F (37.2  C)  Resp 12  Ht 1.6 m (5' 3\")  Wt 86.4 kg (190 lb 8 oz)  SpO2 100%  BMI 33.75 kg/m2  Weight is 190 lbs 8 oz  Body mass index is 33.75 kg/(m^2).    Appearance:  awake, alert, well groomed, wearing her own clothes and appeared as age stated  Attitude:  mostly cooperative, but guarded with some topics  Eye Contact:  good  Mood:  \"I'm having withdrawal\"  Affect:  mood congruent, appropriate and in normal range  Speech:  clear, coherent, normal rate and prosody  Psychomotor Behavior:  no evidence of tardive dyskinesia, dystonia, or tics  Thought Process: Some disorganization  Associations:  loosening of associations present  Thought Content:  Cassidy SI, HI, VH, and AH. Reports of paranoia  Insight:  " "fair  Judgment:  fair  Oriented to:  time, person, and place  Attention Span and Concentration:  fair  Recent and Remote Memory:  intact  Language:  english with appropriate syntax and vocabulary  Fund of Knowledge: appropriate  Muscle Strength and Tone: grossly normal  Gait and Station: Normal     Assessment    Diagnostic Impression: Maddy Ortiz is a 32 year old female with history of schizoaffective disorder, bipolar type who was admitted with psychosis, paranoia, and threats of self harm (cutting nose) in the context of possible medication non-adherence. Family history is unknown, as the patient is adopted, and substance use does not appear to be contributing. The patient's last hospitalization was in 2013 at [facility] with sofía and paranoia. She had previously been on higher doses of Geodon, but she has decreased this gradually due to concerns of side effects. The patient's family does not believe she has been taking her medications recently. MSE is notable for the presence of unspecified \"dread\", a mood incongruent affect, and loosening of associations. Family notes the patient has been paranoid, but the patient denies this. Presentation is consistent with her historical diagnosis of schizoaffective disorder, bipolar type.      Reason for inpatient hospitalization is psychosis, paranoia, and threats of self-harm Disposition pending clinical stabilization, medication optimization, and formulation of safe discharge plan.     Hospital course: Maddy Ortiz was admitted to station 22 as a voluntary patient, for increasing paranoid ideation, disorganized thoughts/behavior, and threatening to cut her nose off. Due to her desire to have a pregnancy in the near future, she expressed desire to switch from geodon to a different medication. She was amenable to starting haldol, which was titrated to a final dose of 5 mg.    Medical course: N/A    Plan     Principal psychiatric diagnosis:   # Schizoaffective disorder, bipolar " type      Secondary psychiatric diagnoses:  # H/O borderline personality disorder, per chart review      Medications:   -Haloperidol 5 mg HS  -Hydroxyzine 25 mg q4 hours prn for anxiety  -Trazodone 50 mg HS prn      Laboratory/Imaging: None      Relevant psychosocial stressors: Financial strain, feeling overwhelmed with scool      Legal Status: Voluntary      Safety Assessment:     Behavioral Checks      Routine Programming      Code 1 - Restrict to Unit      Status 15    - Patient will be treated in therapeutic milieu with appropriate individual and group therapies as described.    Medical diagnoses to be addressed this admission:   # Allergies   - Continue PTA Ceterizine and flonase      # Asthma  - albuterol prn    Anticipated Disposition/Discharge Date: TBD following further assessment and clinical stabilization/improvement.      -------------------------------------------------------------------------------------  IMalinda, MS3, acted as a scribe for Dr.Ryan Jalloh, PGY1.    I have reviewed and edited the documentation recorded by the scribe.  This documentation accurately reflects the services I personally performed and treatment decisions made by me in consultation with the attending physician Marian Plascencia MD.    Maynor Jalloh MD  Psychiatry PGY-1  [phone]    Attestation:  This patient has been seen and evaluated by me, Marian Plascencia.  I have discussed this patient with the house staff team including the resident and medical student and I agree with the findings and plan in this note.    I have reviewed today's vital signs, medications, labs and imaging. Marian Plascencia MD             Labs/Imaging:     No results found for this or any previous visit (from the past 24 hour(s)).

## 2018-04-26 PROCEDURE — 25000132 ZZH RX MED GY IP 250 OP 250 PS 637

## 2018-04-26 PROCEDURE — 12400007 ZZH R&B MH INTERMEDIATE UMMC

## 2018-04-26 PROCEDURE — 99232 SBSQ HOSP IP/OBS MODERATE 35: CPT | Mod: GC | Performed by: PSYCHIATRY & NEUROLOGY

## 2018-04-26 RX ADMIN — TRAZODONE HYDROCHLORIDE 50 MG: 50 TABLET ORAL at 20:04

## 2018-04-26 RX ADMIN — HYDROXYZINE HYDROCHLORIDE 25 MG: 25 TABLET ORAL at 20:04

## 2018-04-26 RX ADMIN — HYDROXYZINE HYDROCHLORIDE 25 MG: 25 TABLET ORAL at 15:09

## 2018-04-26 RX ADMIN — HYDROXYZINE HYDROCHLORIDE 25 MG: 25 TABLET ORAL at 09:44

## 2018-04-26 RX ADMIN — HALOPERIDOL 5 MG: 5 TABLET ORAL at 20:02

## 2018-04-26 RX ADMIN — CETIRIZINE HYDROCHLORIDE 10 MG: 10 TABLET, FILM COATED ORAL at 20:02

## 2018-04-26 ASSESSMENT — ACTIVITIES OF DAILY LIVING (ADL)
HYGIENE/GROOMING: INDEPENDENT
HYGIENE/GROOMING: INDEPENDENT
DRESS: INDEPENDENT
DRESS: INDEPENDENT
LAUNDRY: WITH SUPERVISION
ORAL_HYGIENE: INDEPENDENT
ORAL_HYGIENE: INDEPENDENT

## 2018-04-26 NOTE — PROGRESS NOTES
"  ----------------------------------------------------------------------------------------------------------  [facility], [place]   Psychiatric Progress Note  Hospital Day #3     Interim History:   The patient's care was discussed with the treatment team and chart notes were reviewed.  Sleep: 6.25  PRNs: Vistaril x2    Staff report: Told staff she was unable to sleep due to \"withdrawal\" from geodon, which was giving her chills. Also reported being anxious. Had a visit with her BF.     Patient interview: Patient was seen in the AM in the conference room. She is pleasant and cooperative.    Patient says she was feeling anxious this morning and took vistaril, \"to help me relax.\" Thinks she is anxious, \"because of my mental illness.\" Says last night she slept ok. Says her mood is \"good.\" Still having the \"dread\" feeling. When asked if she still thinks it involves bullies she says yes, but can't articulate how they are involved. Has not been going to groups because she feels tired during the day. When asked about her visit with her boyfriend she says, \"we are forgetful people. We call each other because we forgot.\" When asked what they talk about she says, \"He keeps trying to go back to the neighbors.\" When asked to clarify she says, \"we could manage finances better if we were separate from the neighbors. There are problems with school.\" When asked how this relates she says, \"the standard of care is poor at my apartment.\" Asked about withdrawal from geodon and she says she feels cold and has chills, which is similar to previous withdrawal. Denies feeling feverish, having body aches, or cough. Has a runny nose. Has not noted a difference in the haldol 5 mg and has no side effects. Tried to clarify the scenario prior to admission when she threatened to cut her nose and she says \"it didn't cause anything.\" She says her daughter was there when they were arguing, but says \"my daughter likes to " "argue to. I think we might take her to [employer] like all the other neighbors.\" When asked what [employer] is she says \"it's for children with disabilities.\" Says her daughter's father has a disability and that Leonora was diagnosed with AYAKA.     The risks, benefits, alternatives and side effects of any medication changes have been discussed and are understood by the patient and other caregivers.         Psychiatric Examination:   BP (!) 145/95  Pulse 104  Temp 96.7  F (35.9  C)  Resp 12  Ht 1.6 m (5' 3\")  Wt 85.5 kg (188 lb 8 oz)  SpO2 100%  BMI 33.39 kg/m2  Weight is 188 lbs 8 oz  Body mass index is 33.39 kg/(m^2).    Appearance:  awake, alert, well groomed, wearing her own clothes and appeared as age stated  Attitude:  mostly cooperative, but guarded with some topics  Eye Contact:  good  Mood:  \"Fine\"  Affect:  mood congruent, appropriate and in normal range  Speech:  clear, coherent, normal rate and prosody  Psychomotor Behavior:  no evidence of tardive dyskinesia, dystonia, or tics  Thought Process: Some disorganization  Associations:  loosening of associations present  Thought Content:  Cassidy SI, HI, VH, and AH. Reports of paranoia  Insight:  fair  Judgment:  fair  Oriented to:  time, person, and place  Attention Span and Concentration:  fair  Recent and Remote Memory:  intact  Language:  english with appropriate syntax and vocabulary  Fund of Knowledge: appropriate  Muscle Strength and Tone: grossly normal  Gait and Station: Normal     Assessment    Diagnostic Impression: Maddy Ortiz is a 32 year old female with history of schizoaffective disorder, bipolar type who was admitted with psychosis, paranoia, and threats of self harm (cutting nose) in the context of possible medication non-adherence. Family history is unknown, as the patient is adopted, and substance use does not appear to be contributing. The patient's last hospitalization was in 2013 at [facility] with sofía and paranoia. She had previously been on " "higher doses of Geodon, but she has decreased this gradually due to concerns of side effects. The patient's family does not believe she has been taking her medications recently. MSE is notable for the presence of unspecified \"dread\", a mood incongruent affect, and loosening of associations. Family notes the patient has been paranoid, but the patient denies this. Presentation is consistent with her historical diagnosis of schizoaffective disorder, bipolar type.      Reason for inpatient hospitalization is psychosis, paranoia, and threats of self-harm Disposition pending clinical stabilization, medication optimization, and formulation of safe discharge plan.     Hospital course: Maddy Ortiz was admitted to station 22 as a voluntary patient, for increasing paranoid ideation, disorganized thoughts/behavior, and threatening to cut her nose off. Due to her desire to have a pregnancy in the near future, she expressed desire to switch from geodon to a different medication. She was amenable to starting haldol, which was titrated to a final dose of 5 mg.    Medical course: N/A    Plan     Principal psychiatric diagnosis:   # Schizoaffective disorder, bipolar type      Secondary psychiatric diagnoses:  # H/O borderline personality disorder, per chart review      Medications:   -Haloperidol 5 mg HS  -Hydroxyzine 25 mg q4 hours prn for anxiety  -Trazodone 50 mg HS prn      Laboratory/Imaging: None          Legal Status: Voluntary      Safety Assessment:     Behavioral Checks      Routine Programming      Code 1 - Restrict to Unit      Status 15    - Patient will be treated in therapeutic milieu with appropriate individual and group therapies as described.    Medical diagnoses to be addressed this admission:   # Allergies   - Continue PTA Ceterizine and flonase      # Asthma  - albuterol prn    Anticipated Disposition/Discharge Date: TBD following further assessment and clinical " stabilization/improvement.      -------------------------------------------------------------------------------------  I, Malinda Jensen, MS3, acted as a scribe for Dr.Ryan Jalloh, PGY1.    I have reviewed and edited the documentation recorded by the scribe.  This documentation accurately reflects the services I personally performed and treatment decisions made by me in consultation with the attending physician Marian Plascencia MD.    Maynor Jalloh MD  Psychiatry PGY-1  [phone]    Attestation:  This patient has been seen and evaluated by me, Marian Plascencia.  I have discussed this patient with the house staff team including the resident and medical student and I agree with the findings and plan in this note.    I have reviewed today's vital signs, medications, labs and imaging. Marian Plascencia MD             Labs/Imaging:     No results found for this or any previous visit (from the past 24 hour(s)).

## 2018-04-26 NOTE — PROGRESS NOTES
Isolative to her room most of shift, not social. Declined all groups. Guarded. Ate meals.       04/26/18 1100   Behavioral Health   Thinking distractable;poor concentration;paranoid   Orientation person: oriented;place: oriented   Insight poor   Judgement impaired   Eye Contact at examiner   Affect blunted, flat   Mood anxious   Physical Appearance/Attire attire appropriate to age and situation   Hygiene other (see comment)  (adequate)   1. Wish to be Dead No   2. Non-Specific Active Suicidal Thoughts  No   Activity withdrawn;isolative   Speech clear;coherent   Psychomotor / Gait balanced;steady   Psycho Education   Type of Intervention structured groups   Response refuses   Activities of Daily Living   Hygiene/Grooming independent   Oral Hygiene independent   Dress independent   Laundry with supervision   Room Organization independent

## 2018-04-26 NOTE — PROGRESS NOTES
Pt spent the majority of the evening in her room lying in bed/sleeping.  During check in pt stated she didn't sleep all night due to withdrawal symptoms (chills).  Pt reports feeling anxious.  Pt had a visit with her boyfriend and made an attempt to give her boyfriend hospital food but was stopped by writer.  Pt ate dinner.  Denies all hallucinations, SI/SIB and medication sensitivity.         04/25/18 2100   Behavioral Health   Hallucinations denies / not responding to hallucinations   Thinking distractable;delusional   Orientation person: oriented;place: oriented   Insight poor   Judgement impaired   Eye Contact at examiner   Affect blunted, flat   Mood anxious;mood is calm   Physical Appearance/Attire attire appropriate to age and situation   Hygiene other (see comment)  (fair)   Suicidality other (see comments)  (denies)   1. Wish to be Dead No   2. Non-Specific Active Suicidal Thoughts  No   Self Injury other (see comment)  (denies)   Activity isolative   Speech clear;coherent   Medication Sensitivity no stated side effects;no observed side effects   Psychomotor / Gait balanced;steady   Psycho Education   Type of Intervention 1:1 intervention   Response participates, initiates socially appropriate   Hours 0.5   Treatment Detail check in   Activities of Daily Living   Hygiene/Grooming independent   Oral Hygiene independent   Dress independent   Laundry with supervision   Room Organization independent

## 2018-04-27 PROCEDURE — 25000132 ZZH RX MED GY IP 250 OP 250 PS 637

## 2018-04-27 PROCEDURE — 99232 SBSQ HOSP IP/OBS MODERATE 35: CPT | Mod: GC | Performed by: PSYCHIATRY & NEUROLOGY

## 2018-04-27 PROCEDURE — 12400007 ZZH R&B MH INTERMEDIATE UMMC

## 2018-04-27 RX ORDER — CLONAZEPAM 0.5 MG/1
0.5 TABLET ORAL 2 TIMES DAILY PRN
Status: DISCONTINUED | OUTPATIENT
Start: 2018-04-27 | End: 2018-05-03 | Stop reason: HOSPADM

## 2018-04-27 RX ADMIN — CETIRIZINE HYDROCHLORIDE 10 MG: 10 TABLET, FILM COATED ORAL at 19:49

## 2018-04-27 RX ADMIN — TRAZODONE HYDROCHLORIDE 50 MG: 50 TABLET ORAL at 21:13

## 2018-04-27 RX ADMIN — HYDROXYZINE HYDROCHLORIDE 25 MG: 25 TABLET ORAL at 06:57

## 2018-04-27 RX ADMIN — TRAZODONE HYDROCHLORIDE 50 MG: 50 TABLET ORAL at 20:42

## 2018-04-27 RX ADMIN — HALOPERIDOL 7 MG: 5 TABLET ORAL at 19:49

## 2018-04-27 ASSESSMENT — ACTIVITIES OF DAILY LIVING (ADL)
GROOMING: INDEPENDENT
LAUNDRY: WITH SUPERVISION
DRESS: INDEPENDENT
LAUNDRY: WITH SUPERVISION
ORAL_HYGIENE: INDEPENDENT
DRESS: INDEPENDENT;SCRUBS (BEHAVIORAL HEALTH)
ORAL_HYGIENE: INDEPENDENT
GROOMING: INDEPENDENT

## 2018-04-27 NOTE — PROGRESS NOTES
Pt was visible in the milieu multiple times throughout the shift. She appears confused, told staff she had an interview today and had trouble elaborating. Pt denies SI/SIB, hallucinations, and medication side affects. Observed making phone calls frequently, ate meals, and did not remain in groups. Thinking appears scattered, she stated she felt anxious, and observed restless.      04/27/18 7503   Behavioral Health   Hallucinations denies / not responding to hallucinations   Thinking distractable;delusional;confused   Orientation person: oriented;place: oriented   Insight poor   Judgement impaired   Eye Contact at examiner   Affect blunted, flat   Mood anxious   Physical Appearance/Attire attire appropriate to age and situation   Hygiene other (see comment)  (adequate )   Suicidality other (see comments)  (denies )   1. Wish to be Dead No   2. Non-Specific Active Suicidal Thoughts  No   Self Injury other (see comment)  (denies )   Elopement (No current concerns)   Activity isolative;withdrawn   Speech coherent;clear   Medication Sensitivity no stated side effects   Psychomotor / Gait balanced;steady   Psycho Education   Type of Intervention 1:1 intervention   Response participates with encouragement   Hours 0.5   Treatment Detail check in    Activities of Daily Living   Hygiene/Grooming independent   Oral Hygiene independent   Dress independent   Laundry with supervision   Room Organization independent   Activity   Activity Assistance Provided independent

## 2018-04-27 NOTE — PROGRESS NOTES
"  ----------------------------------------------------------------------------------------------------------  [facility], [place]   Psychiatric Progress Note  Hospital Day #4     Interim History:   The patient's care was discussed with the treatment team and chart notes were reviewed.  Sleep: 6.75  PRNs: Hydroxyzine x4, trazodone x1    Staff report: Patient was isolative to her room, and she declined all groups. Appeared irritable during visit with boyfriend.       Patient interview: The patient was interviewed in her bedroom this morning. She was cooperative, but relatively superficial regarding her symptoms. She had an argument with her boyfriend last night. She had trouble explaining what the argument was about, but she eventually shared that it had to do with her neighbors. She thinks that if she just \"deletes\" her neighbors out of her mind and leave them alone, then things will be okay. She was unable to explain what types of concerns she had with her neighbors. Her mood was \"good\", and she is sleeping okay. Her appetite is \"big\", and she is unsure if this is different from her baseline. Her feeling of \"dread\" is improving, which she attributes to taking medications. We talked about a ANTUNEZ, and she was open to this.     Further information was gathered regarding the events leading up to the hospitalization. The patient got into an argument with her boyfriend, and threatened to cut her nose. She held a closed  during argument. The patient's daughter witnessed the incident. The case was discussed with CTCs from the [facility]. Given that the boxcutter was closed and there was no reason to believe the patient's daughter was in danger, a CPS report will not be filed.       The risks, benefits, alternatives and side effects of any medication changes have been discussed and are understood by the patient and other caregivers.         Psychiatric Examination:   BP " "(!) 145/95  Pulse 104  Temp 96.7  F (35.9  C)  Resp 12  Ht 1.6 m (5' 3\")  Wt 85.5 kg (188 lb 8 oz)  SpO2 100%  BMI 33.39 kg/m2  Weight is 188 lbs 8 oz  Body mass index is 33.39 kg/(m^2).    Appearance:  awake, alert, well groomed, wearing hospital scrubs  Attitude:  mostly cooperative, but guarded with some topics  Eye Contact:  good  Mood:  \"Good\"  Affect:  mood congruent, appropriate and in normal range  Speech:  clear, coherent, normal rate and prosody  Psychomotor Behavior:  no evidence of tardive dyskinesia, dystonia, or tics  Thought Process: Some disorganization  Associations:  loosening of associations present  Thought Content:  Cassidy SI, HI, VH, and AH. Reports of paranoia.   Insight:  fair  Judgment:  fair  Oriented to:  time, person, and place  Attention Span and Concentration:  fair  Recent and Remote Memory:  intact  Language:  english with appropriate syntax and vocabulary  Fund of Knowledge: appropriate  Muscle Strength and Tone: grossly normal  Gait and Station: Normal     Assessment    Diagnostic Impression: Maddy Ortiz is a 32 year old female with history of schizoaffective disorder, bipolar type who was admitted with psychosis, paranoia, and threats of self harm (cutting nose) in the context of possible medication non-adherence. Family history is unknown, as the patient is adopted, and substance use does not appear to be contributing. The patient's last hospitalization was in 2013 at [facility] with sofía and paranoia. She had previously been on higher doses of Geodon, but she has decreased this gradually due to concerns of side effects. The patient's family does not believe she has been taking her medications recently. MSE is notable for the presence of unspecified \"dread\", a mood incongruent affect, and loosening of associations. Family notes the patient has been paranoid, but the patient denies this. Presentation is consistent with her historical diagnosis of schizoaffective disorder, bipolar " type.      Reason for inpatient hospitalization is psychosis, paranoia, and threats of self-harm Disposition pending clinical stabilization, medication optimization, and formulation of safe discharge plan.     Hospital course: Maddy Ortiz was admitted to station 22 as a voluntary patient, for increasing paranoid ideation, disorganized thoughts/behavior, and threatening to cut her nose off. Due to her desire to have a pregnancy in the near future, she expressed desire to switch from geodon to a different medication. She was amenable to starting haldol, which was titrated to a final dose of 7 mg. Klonopin prn was provided for anxiety.     Medical course: N/A    Plan     Principal psychiatric diagnosis:   # Schizoaffective disorder, bipolar type      Secondary psychiatric diagnoses:  # H/O borderline personality disorder, per chart review      Medications:   -Haloperidol 7 mg HS  -Hydroxyzine 25 mg q4 hours prn for anxiety  -Trazodone 50 mg HS prn  -Klonopin 0.5 mg bid prn      Laboratory/Imaging: None          Legal Status: Voluntary      Safety Assessment:     Behavioral Checks      Routine Programming      Code 1 - Restrict to Unit      Status 15    - Patient will be treated in therapeutic milieu with appropriate individual and group therapies as described.    Medical diagnoses to be addressed this admission:   # Allergies   - Continue PTA Ceterizine and flonase      # Asthma  - albuterol prn    Anticipated Disposition/Discharge Date: TBD following further assessment and clinical stabilization/improvement.      -------------------------------------------------------------------------------------  Patient seen and discussed with attending, Dr. Temo Jalloh MD  Psychiatry PGY-1  [phone]    Attestation:  This patient has been seen and evaluated by me, Marian Plascencia.  I have discussed this patient with the house staff team including the resident and medical student and I agree with the findings and plan  in this note.    I have reviewed today's vital signs, medications, labs and imaging. Marian Plascencia MD             Labs/Imaging:   TSH 2.33  EtOH <0.01  Acetaminophen <2, Salicylate <2  BMP wnl  CBC with WBC of 14.1, otherwise wnl  HCG negative  Utox negative  EKG with NSR,

## 2018-04-27 NOTE — PROGRESS NOTES
Pt is isolative, keeps to herself.  Blunted, flat affect.  Though pt got irritable during visit with boyfriend.  Pt reports having an okay day, feeling relaxed.  Is feeling drowsy from medications.  Denies hallucinations and SI/SIB.         04/26/18 2200   Behavioral Health   Hallucinations denies / not responding to hallucinations   Thinking distractable;delusional   Orientation person: oriented;place: oriented   Insight poor   Judgement impaired   Eye Contact at examiner   Affect blunted, flat;irritable   Mood mood is calm   Physical Appearance/Attire attire appropriate to age and situation   Hygiene other (see comment)  (fair)   Suicidality other (see comments)  (denies)   1. Wish to be Dead No   2. Non-Specific Active Suicidal Thoughts  No   Self Injury other (see comment)  (denies)   Activity isolative   Speech clear;coherent   Medication Sensitivity other (see comment)  (drowsiness)   Psychomotor / Gait balanced;steady   Psycho Education   Type of Intervention 1:1 intervention   Response participates, initiates socially appropriate   Hours 0.5   Treatment Detail check in   Activities of Daily Living   Hygiene/Grooming independent   Oral Hygiene independent   Dress independent   Room Organization independent

## 2018-04-27 NOTE — PROGRESS NOTES
Pt has yet to attend OT group.   Plan: Pt will be given a self assessment form, or writer will review this information verbally with patient. OT staff will explain the value of being included in treatment planning, and offer options to meet needs and identified goals.

## 2018-04-28 PROCEDURE — 12400007 ZZH R&B MH INTERMEDIATE UMMC

## 2018-04-28 PROCEDURE — 25000132 ZZH RX MED GY IP 250 OP 250 PS 637

## 2018-04-28 RX ADMIN — TRAZODONE HYDROCHLORIDE 50 MG: 50 TABLET ORAL at 19:47

## 2018-04-28 RX ADMIN — TRAZODONE HYDROCHLORIDE 50 MG: 50 TABLET ORAL at 20:23

## 2018-04-28 RX ADMIN — HALOPERIDOL 7 MG: 5 TABLET ORAL at 19:47

## 2018-04-28 RX ADMIN — CLONAZEPAM 0.5 MG: 0.5 TABLET ORAL at 10:56

## 2018-04-28 RX ADMIN — CETIRIZINE HYDROCHLORIDE 10 MG: 10 TABLET, FILM COATED ORAL at 19:47

## 2018-04-28 ASSESSMENT — ACTIVITIES OF DAILY LIVING (ADL)
ORAL_HYGIENE: INDEPENDENT
DRESS: INDEPENDENT
ORAL_HYGIENE: INDEPENDENT
LAUNDRY: WITH SUPERVISION
HYGIENE/GROOMING: INDEPENDENT;SHOWER
DRESS: INDEPENDENT;STREET CLOTHES
LAUNDRY: WITH SUPERVISION
GROOMING: INDEPENDENT
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
## 2018-04-28 NOTE — PLAN OF CARE
Problem: Cognitive Impairment (Psychotic Signs/Symptoms) (Adult)  Goal: Improved Thought Clarity/Organization (Psychotic Signs/Symptoms)  Outcome: Improving  Maddy continues disorganized-put hospital towels and scrubs in laundry with personal clothes and insisted on washing without soap-appears anxious-guarded in interactions-does report continued feeling of dread and anxiety-received Klonopin 0.5 mg PO at 1056-initially stated no improvement, but post pausing and thinking stated she did feel some relief-less pacing today

## 2018-04-28 NOTE — PROGRESS NOTES
"Patient was calm this evening but reported feeling \"tired.\" Patient attended and participated in community meeting, was visible in the milieu at times but was only quietly social with others. Patient was withdrawn and isolative to her room sleeping and napping during the evening. Patient paced the halls this evening and toward the end of the night complained of a sore knee and not being able to sleep. Patient denies SI/SIB as well as hallucinations. ADL's are independent with no nutrition concerns. Patient did her laundry this evening and did not have any visitors. Patient did not report any further concerns or input for her treatment team.        04/27/18 2050   Behavioral Health   Hallucinations denies / not responding to hallucinations   Thinking distractable;delusional;poor concentration   Orientation other (see comment)  (EMILY)   Memory baseline memory   Insight poor   Judgement impaired   Eye Contact at examiner   Affect blunted, flat;tense   Mood mood is calm;anxious   Physical Appearance/Attire attire appropriate to age and situation;neat   Hygiene other (see comment)  (fair)   Suicidality other (see comments)  (Patient denies)   Self Injury other (see comment)  (Patient denies)   Elopement (Patient does not appear to be a risk)   Activity restless;withdrawn;isolative;other (see comment)  (Did attend community meeting, pacing hallways)   Speech clear;coherent   Medication Sensitivity no observed side effects;no stated side effects   Psychomotor / Gait steady;balanced;paces     "

## 2018-04-29 PROCEDURE — 97150 GROUP THERAPEUTIC PROCEDURES: CPT | Mod: GO

## 2018-04-29 PROCEDURE — 12400007 ZZH R&B MH INTERMEDIATE UMMC

## 2018-04-29 PROCEDURE — 25000132 ZZH RX MED GY IP 250 OP 250 PS 637

## 2018-04-29 PROCEDURE — 25000132 ZZH RX MED GY IP 250 OP 250 PS 637: Performed by: STUDENT IN AN ORGANIZED HEALTH CARE EDUCATION/TRAINING PROGRAM

## 2018-04-29 RX ORDER — CALCIUM CARBONATE 500 MG/1
500-1000 TABLET, CHEWABLE ORAL EVERY 4 HOURS PRN
Status: DISCONTINUED | OUTPATIENT
Start: 2018-04-29 | End: 2018-05-03 | Stop reason: HOSPADM

## 2018-04-29 RX ORDER — CETIRIZINE HYDROCHLORIDE 10 MG/1
10 TABLET ORAL DAILY PRN
Status: DISCONTINUED | OUTPATIENT
Start: 2018-04-29 | End: 2018-05-03 | Stop reason: HOSPADM

## 2018-04-29 RX ADMIN — HALOPERIDOL 7 MG: 5 TABLET ORAL at 19:30

## 2018-04-29 RX ADMIN — CETIRIZINE HYDROCHLORIDE 10 MG: 10 TABLET, FILM COATED ORAL at 16:54

## 2018-04-29 RX ADMIN — TRAZODONE HYDROCHLORIDE 50 MG: 50 TABLET ORAL at 20:11

## 2018-04-29 RX ADMIN — CALCIUM CARBONATE (ANTACID) CHEW TAB 500 MG 1000 MG: 500 CHEW TAB at 20:34

## 2018-04-29 RX ADMIN — CLONAZEPAM 0.5 MG: 0.5 TABLET ORAL at 09:30

## 2018-04-29 RX ADMIN — TRAZODONE HYDROCHLORIDE 50 MG: 50 TABLET ORAL at 19:35

## 2018-04-29 RX ADMIN — HYDROXYZINE HYDROCHLORIDE 25 MG: 25 TABLET ORAL at 21:46

## 2018-04-29 RX ADMIN — CLONAZEPAM 0.5 MG: 0.5 TABLET ORAL at 20:11

## 2018-04-29 ASSESSMENT — ACTIVITIES OF DAILY LIVING (ADL)
GROOMING: INDEPENDENT
GROOMING: INDEPENDENT
DRESS: STREET CLOTHES;INDEPENDENT
ORAL_HYGIENE: INDEPENDENT
ORAL_HYGIENE: INDEPENDENT
LAUNDRY: WITH SUPERVISION

## 2018-04-29 NOTE — PROGRESS NOTES
04/29/18 1151   Behavioral Health   Hallucinations denies / not responding to hallucinations   Thinking distractable   Orientation person: oriented;place: oriented;date: oriented;time: oriented   Memory baseline memory   Insight poor   Judgement impaired   Eye Contact at examiner   Affect blunted, flat   Mood mood is calm   Physical Appearance/Attire attire appropriate to age and situation   Hygiene well groomed   Suicidality other (see comments)  (pt denies)   1. Wish to be Dead No   2. Non-Specific Active Suicidal Thoughts  No   Self Injury other (see comment)  (pt denies)   Elopement (none observed)   Activity restless   Speech clear;coherent   Psychomotor / Gait balanced;steady   Psycho Education   Type of Intervention 1:1 intervention   Response participates, initiates socially appropriate   Hours 0.5   Treatment Detail check-in   Activities of Daily Living   Hygiene/Grooming independent   Oral Hygiene independent   Room Organization independent     Pt presents with a calm affect. Pt active in milieu; walked in the gonzalez with other patients. Pleasant and cooperative with staff and other patients. Attended OT. During staff check-in pt stated she feels good. Pt stated she believes the medications are working well for her. Denies any SI, SIB, hallucinations this shift.

## 2018-04-29 NOTE — PROGRESS NOTES
"Pt visible in milieu, keeps to self, did laundry.  Appetite good.  Reports doing good, her goal was to speak with her boyfriend about personal things which she did.  Boyfriend visited which went well.  Pt reports feeling more energized today and said \"I think the medications are working\".  Denies all symptoms including hallucinations, SI/SIB and medication sensitivity.         04/28/18 2100   Behavioral Health   Hallucinations denies / not responding to hallucinations   Thinking distractable   Orientation person: oriented;place: oriented   Memory baseline memory   Insight poor   Judgement impaired   Eye Contact at examiner   Affect blunted, flat;tense   Mood mood is calm   Physical Appearance/Attire attire appropriate to age and situation   Hygiene well groomed   Suicidality other (see comments)  (denies)   1. Wish to be Dead No   2. Non-Specific Active Suicidal Thoughts  No   Self Injury other (see comment)  (denies)   Activity isolative   Speech clear;coherent   Medication Sensitivity no stated side effects;no observed side effects   Psychomotor / Gait balanced;steady   Psycho Education   Type of Intervention 1:1 intervention   Response participates, initiates socially appropriate   Hours 0.5   Treatment Detail check in   Activities of Daily Living   Hygiene/Grooming independent;shower   Oral Hygiene independent   Dress independent;street clothes   Laundry with supervision   Room Organization independent     "

## 2018-04-29 NOTE — PROGRESS NOTES
Initially seen by OT on this date. Maddy  participated in OT clinic and was able to initiate task, follow through with plan and ask for help as needed.  Worked with focus on beautiful detailed drawing, which she reported was a portrait of Beni. Quiet and guarded in verbal interaction. Will be given a written self-assessment upon increased group attendance. More observation needed to complete initial evaluation at this time.

## 2018-04-30 ENCOUNTER — TELEPHONE (OUTPATIENT)
Dept: BEHAVIORAL HEALTH | Facility: CLINIC | Age: 33
End: 2018-04-30

## 2018-04-30 PROCEDURE — 12400007 ZZH R&B MH INTERMEDIATE UMMC

## 2018-04-30 PROCEDURE — 97150 GROUP THERAPEUTIC PROCEDURES: CPT | Mod: GO

## 2018-04-30 PROCEDURE — 99232 SBSQ HOSP IP/OBS MODERATE 35: CPT | Mod: GC | Performed by: PSYCHIATRY & NEUROLOGY

## 2018-04-30 PROCEDURE — 25000132 ZZH RX MED GY IP 250 OP 250 PS 637

## 2018-04-30 RX ADMIN — CLONAZEPAM 0.5 MG: 0.5 TABLET ORAL at 21:59

## 2018-04-30 RX ADMIN — HALOPERIDOL 7 MG: 5 TABLET ORAL at 20:24

## 2018-04-30 RX ADMIN — TRAZODONE HYDROCHLORIDE 50 MG: 50 TABLET ORAL at 21:05

## 2018-04-30 ASSESSMENT — ACTIVITIES OF DAILY LIVING (ADL)
ORAL_HYGIENE: INDEPENDENT
LAUNDRY: WITH SUPERVISION
DRESS: INDEPENDENT
ORAL_HYGIENE: INDEPENDENT
GROOMING: INDEPENDENT
DRESS: INDEPENDENT
GROOMING: INDEPENDENT

## 2018-04-30 NOTE — PROGRESS NOTES
Maddy   attended 1 of 3 OT groups today. She  participated in OT clinic and was able to initiate task, follow through with plan and ask for help as needed.  Is working on a series of free-form drawings that she hopes to make into a coloring book. Pt is withdrawn and softspoken in verbal interactions, slightly anxious. Was given a written self-assessment, has not yet completed it.

## 2018-04-30 NOTE — PROGRESS NOTES
Pt appeared tired throughout the shift, in the milieu one moment, then napping in room the next. She was isolative and withdrawn, ate meals in her room. Pt was observed having an intense conversation on the phone early this morning. Pt denies SI/SIB, hallucinations, and medication side affects. She refused groups stating she could work on projects independently of group. She states she will be joining an outpatient group after discharge, she plans on biking from [place] to this hospital, which is a 2hr and 55min ride. She questioned if it would be possible to be involved in a program that is half the day rather then a full day, allowing her to get her child ready for school in the morning.      04/30/18 1054   Behavioral Health   Hallucinations denies / not responding to hallucinations   Thinking distractable;intact   Orientation person: oriented;place: oriented   Memory baseline memory   Insight poor   Judgement impaired   Eye Contact at examiner   Affect blunted, flat   Mood mood is calm   Physical Appearance/Attire attire appropriate to age and situation   Hygiene well groomed   Suicidality other (see comments)  (denies )   1. Wish to be Dead No   2. Non-Specific Active Suicidal Thoughts  No   Self Injury other (see comment)  (denies )   Elopement (No current concerns)   Activity restless   Speech clear;coherent   Medication Sensitivity no stated side effects;no observed side effects   Psychomotor / Gait balanced;steady   Psycho Education   Type of Intervention 1:1 intervention   Response participates with encouragement   Hours 0.5   Treatment Detail check in    Activities of Daily Living   Hygiene/Grooming independent   Oral Hygiene independent   Dress independent   Laundry with supervision   Room Organization independent   Activity   Activity Assistance Provided independent

## 2018-04-30 NOTE — TELEPHONE ENCOUNTER
Writer reviewed the Day Treatment referral and request to be seen on the inpatient unit. Maddy will be scheduled to be seen tomorrow morning. The only concern that needs to be addressed is her plan to ride her bike to and from the program. She lives over 20 miles from the program so this may not be a wise idea.

## 2018-04-30 NOTE — PROGRESS NOTES
"  ----------------------------------------------------------------------------------------------------------  [facility], [place]   Psychiatric Progress Note  Hospital Day #7     Interim History:   The patient's care was discussed with the treatment team and chart notes were reviewed.  Sleep: 7 hours  PRNs: 4/27 - Hydroxyzine x2, Trazodone x1              4/28 - Klonopin x1, trazodone x2              4/29 - Klonopin x2, hydroxyzine x1, Trazodone x2    Staff report: Patient was disorganized/scattered during staff check-in. Reported continued feeling of dread. Had a good visit with her boyfriend. Felt more energized, and thinks the medications are helping.       Patient interview: The patient was interviewed in the conference room this morning. She was planning on getting into the shower, so she requested that the interview be brief. She stated she did well over the weekend. She continues to have some dread, but she had difficulty describing it. She states it gets worse during certain situations, and it is sometimes triggered by people, particularly when they have an \"attitude\". She had difficulty explaining further. \"I don't know what to say\". She had a nice visit with her boyfriend over the weekend. She hasn't been able see her daughter yet, but she is hoping to see her soon. She feels that the Klonopin has been helpful for sleep. She has been taking Klonopin and trazodone at night, and she feels groggy in the morning. She is planning to try not taking trazodone tonight to see if this improves. We discussed discharge planning, and she is okay with doing day treatment upon discharge.     The risks, benefits, alternatives and side effects of any medication changes have been discussed and are understood by the patient and other caregivers.         Psychiatric Examination:   /80  Pulse 91  Temp 97.8  F (36.6  C) (Tympanic)  Resp 16  Ht 1.6 m (5' 3\")  Wt 87.1 kg (192 lb)  SpO2 94% " " BMI 34.01 kg/m2  Weight is 192 lbs 0 oz  Body mass index is 34.01 kg/(m^2).    Appearance:  awake, alert, well groomed, wearing hospital scrubs  Attitude: Cooperative   Eye Contact:  good  Mood:  \"fine\"  Affect:  mood congruent, constricted  Speech:  clear, coherent, normal rate and prosody  Psychomotor Behavior:  no evidence of tardive dyskinesia, dystonia, or tics  Thought Process: Some disorganization  Associations:  loosening of associations present  Thought Content:  Cassidy SI, HI, VH, and AH. Reports of paranoia.   Insight:  fair  Judgment:  fair  Oriented to:  time, person, and place  Attention Span and Concentration:  fair  Recent and Remote Memory:  intact  Language:  english with appropriate syntax and vocabulary  Fund of Knowledge: appropriate  Muscle Strength and Tone: grossly normal  Gait and Station: Normal     Assessment    Diagnostic Impression: Maddy Ortiz is a 32 year old female with history of schizoaffective disorder, bipolar type who was admitted with psychosis, paranoia, and threats of self harm (cutting nose) in the context of possible medication non-adherence. Family history is unknown, as the patient is adopted, and substance use does not appear to be contributing. The patient's last hospitalization was in 2013 at [facility] with sofía and paranoia. She had previously been on higher doses of Geodon, but she has decreased this gradually due to concerns of side effects. The patient's family does not believe she has been taking her medications recently. MSE is notable for the presence of unspecified \"dread\", a mood incongruent affect, and loosening of associations. Family notes the patient has been paranoid, but the patient denies this. Presentation is consistent with her historical diagnosis of schizoaffective disorder, bipolar type.      Reason for inpatient hospitalization is psychosis, paranoia, and threats of self-harm Disposition pending clinical stabilization, medication optimization, and " formulation of safe discharge plan.     Hospital course: Maddy Ortiz was admitted to station 22 as a voluntary patient, for increasing paranoid ideation, disorganized thoughts/behavior, and threatening to cut her nose off. Due to her desire to have a pregnancy in the near future, she expressed desire to switch from geodon to a different medication. She was amenable to starting haldol, which was titrated to a final dose of 7 mg. Klonopin prn was provided for anxiety.     Medical course: N/A    Plan     Principal psychiatric diagnosis:   # Schizoaffective disorder, bipolar type      Secondary psychiatric diagnoses:  # H/O borderline personality disorder, per chart review      Medications:   -Haloperidol 7 mg HS  -Hydroxyzine 25 mg q4 hours prn for anxiety  -Trazodone 50 mg HS prn  -Klonopin 0.5 mg bid prn       Laboratory/Imaging: None          Legal Status: Voluntary      Safety Assessment:     Behavioral Checks      Routine Programming      Code 1 - Restrict to Unit      Status 15    - Patient will be treated in therapeutic milieu with appropriate individual and group therapies as described.    Medical diagnoses to be addressed this admission:   # Allergies   - Continue PTA Ceterizine and flonase      # Asthma  - albuterol prn    Anticipated Disposition/Discharge Date: Likely discharge home later this week with day treatment      -------------------------------------------------------------------------------------  Patient seen and discussed with attending, Dr. Temo Jalloh MD  Psychiatry PGY-1  [phone]      Attestation:  This patient has been seen and evaluated by me, Marian Plascencia.  I have discussed this patient with the house staff team including the resident and medical student and I agree with the findings and plan in this note.    I have reviewed today's vital signs, medications, labs and imaging. Marian Plascencia MD             Labs/Imaging:   TSH 2.33  EtOH <0.01  Acetaminophen <2, Salicylate  <2  BMP wnl  CBC with WBC of 14.1, otherwise wnl  HCG negative  Utox negative  EKG with NSR,

## 2018-05-01 ENCOUNTER — BEH TREATMENT PLAN (OUTPATIENT)
Dept: BEHAVIORAL HEALTH | Facility: CLINIC | Age: 33
End: 2018-05-01
Attending: PSYCHIATRY & NEUROLOGY

## 2018-05-01 PROCEDURE — 12400007 ZZH R&B MH INTERMEDIATE UMMC

## 2018-05-01 PROCEDURE — 99232 SBSQ HOSP IP/OBS MODERATE 35: CPT | Mod: GC | Performed by: PSYCHIATRY & NEUROLOGY

## 2018-05-01 PROCEDURE — 25000132 ZZH RX MED GY IP 250 OP 250 PS 637

## 2018-05-01 RX ORDER — TRAZODONE HYDROCHLORIDE 50 MG/1
50 TABLET, FILM COATED ORAL
Status: DISCONTINUED | OUTPATIENT
Start: 2018-05-01 | End: 2018-05-03 | Stop reason: HOSPADM

## 2018-05-01 RX ADMIN — CLONAZEPAM 0.5 MG: 0.5 TABLET ORAL at 20:30

## 2018-05-01 RX ADMIN — HALOPERIDOL 7 MG: 5 TABLET ORAL at 19:38

## 2018-05-01 ASSESSMENT — ACTIVITIES OF DAILY LIVING (ADL)
DRESS: INDEPENDENT
GROOMING: INDEPENDENT
ORAL_HYGIENE: INDEPENDENT
GROOMING: INDEPENDENT

## 2018-05-01 NOTE — PROGRESS NOTES
"Initial Individual Treatment Plan     Patient: Maddy Ortiz   MRN: [id number]  : 1985  Age: 32 year old  Sex: female    Diagnostic Assessment Date / Date of Initial Individual Treatment Plan: 18      Immediate Health Concerns:  No     Immediate Safety Concerns:  No    Identify the issues to be addressed in treatment:  Symptom Management, Personal Safety, Community Resources/Discharge Planning, Develop / Improve Independent Living Skills and Develop Socialization / Interpersonal Relationship Skills    Client Initial Individualized Goals for Treatment: \"to prepare for a healthy lifestyle\".    Initial Treatment suggestions for the client during the time between Diagnostic Assessment and completion of the Individualized Treatment Plan:   Ask for more information, support and/or assistance as needed.  Follow up with providers/community supports as needed:   Report increases or changes in symptoms to staff.  Report any personal safety concerns to staff.   Take medications as prescribed.  Report medication changes and/or side effects to staff.  Attend and participate in groups as scheduled or notify staff if unable to do so.  Report any use of substances to staff as this may impact your symptoms and/or  personal safety.  Notify staff if you have any other issues that need to be addressed. This may include  any current abuse / neglect / exploitation or other vulnerability.  Follow recommendations of your treatment team and discuss concerns if not in  agreement.     Treatment Team Responsible: Day Treatment (DT)      Therapeutic Interventions/Treatment Strategies may include:  Support, Redirection, Feedback, Limit/Boundaries, Safety Assessments, Structured Activity, Problem Solving, Clarification, Education, Motivational Enhancement and Relapse Prevention as needed.    Sridevi Aldana, Student  Nessa Loera, [facility]              "

## 2018-05-01 NOTE — PROGRESS NOTES
"  ----------------------------------------------------------------------------------------------------------  [facility], [place]   Psychiatric Progress Note  Hospital Day #8     Interim History:   The patient's care was discussed with the treatment team and chart notes were reviewed.  Sleep: 7 hours  PRNs: Klonopin x1, Trazodone x1     Staff report: Patient was relatively isolative and withdrawn, and she ate her meals in her room. When asked if she had anxiety, stated \"I don't know I think I just need to take my meds\".     Patient interview: The patient was interviewed in the conference room this morning. Just prior to meeting with the team, she had an intake appointment for day treatment, which she states went well. She felt tired this morning, and she requested that her medications be given earlier in the evening to reduce AM sedation. There is still some feelings of \"dread\" present, but it has improved from admission. She had a good phone call with her boyfriend yesterday. She still has some concerns about her neighbors. When asked about how she will handle these concerns, she stated that she will try to keep to herself. She was asked to expound further on the concerns, and she responded \"I don't know what to say\". She is hoping to discharge within the next few days.     The risks, benefits, alternatives and side effects of any medication changes have been discussed and are understood by the patient and other caregivers.         Psychiatric Examination:   /81  Pulse 92  Temp 97.6  F (36.4  C) (Tympanic)  Resp 16  Ht 1.6 m (5' 3\")  Wt 87.1 kg (192 lb)  SpO2 97%  BMI 34.01 kg/m2  Weight is 192 lbs 0 oz  Body mass index is 34.01 kg/(m^2).    Appearance:  awake, alert, well groomed, wearing hospital scrubs  Attitude: Cooperative   Eye Contact:  good  Mood:  \"fine\"  Affect:  mood congruent, constricted  Speech:  clear, coherent, normal rate and prosody  Psychomotor Behavior:  " "no evidence of tardive dyskinesia, dystonia, or tics  Thought Process: Some disorganization  Associations:  No loosening of associations  Thought Content:  Cassidy SI, HI, VH, and AH. Paranoia about her neighbors  Insight:  fair  Judgment:  fair  Oriented to:  time, person, and place  Attention Span and Concentration:  fair  Recent and Remote Memory:  intact  Language:  english with appropriate syntax and vocabulary  Fund of Knowledge: appropriate  Muscle Strength and Tone: grossly normal  Gait and Station: Normal     Assessment    Diagnostic Impression: Maddy Ortiz is a 32 year old female with history of schizoaffective disorder, bipolar type who was admitted with psychosis, paranoia, and threats of self harm (cutting nose) in the context of possible medication non-adherence. Family history is unknown, as the patient is adopted, and substance use does not appear to be contributing. The patient's last hospitalization was in 2013 at [facility] with sofía and paranoia. She had previously been on higher doses of Geodon, but she has decreased this gradually due to concerns of side effects. The patient's family does not believe she has been taking her medications recently. MSE is notable for the presence of unspecified \"dread\", a mood incongruent affect, and loosening of associations. Family notes the patient has been paranoid, but the patient denies this. Presentation is consistent with her historical diagnosis of schizoaffective disorder, bipolar type.      Reason for inpatient hospitalization is psychosis, paranoia, and threats of self-harm.     Hospital course: Maddy Ortiz was admitted to station 22 as a voluntary patient. The patient expressed desire to switch from Geodon to a different medication, as she was thinking about becoming pregnant, and she was under the impression that Geodon could cause harm during pregnancy. Haldol was initiated, and titrated to a final dose of 7 mg HS. During her initial presentation, she " was disorganized and paranoid with clear loosening of association. After the above medication changes, she was more organized during our interviews, and loosening of associations was no longer apparent. She also ceased voicing concerns about her previous paranoid delusions. She expressed a good deal of anxiety, so Klonopin 0.5 mg bid prn was initiated with good effect.     Medical course: N/A    Plan     Principal psychiatric diagnosis:   # Schizoaffective disorder, bipolar type      Secondary psychiatric diagnoses:  # H/O borderline personality disorder, per chart review      Medications:   -Haloperidol 7 mg HS  -Hydroxyzine 25 mg q4 hours prn for anxiety  -Trazodone 50 mg HS prn  -Klonopin 0.5 mg bid prn       Laboratory/Imaging: None          Legal Status: Voluntary      Safety Assessment:     Behavioral Checks      Routine Programming      Code 1 - Restrict to Unit      Status 15    - Patient will be treated in therapeutic milieu with appropriate individual and group therapies as described.    Medical diagnoses to be addressed this admission:   # Allergies   - Continue PTA Ceterizine and flonase      # Asthma  - albuterol prn    Anticipated Disposition/Discharge Date: Likely discharge home later this week with day treatment      -------------------------------------------------------------------------------------  Patient seen and discussed with attending, Dr. Temo Jalloh MD  Psychiatry PGY-1  [phone]      Attestation:  This patient has been seen and evaluated by me, Marian Plascencia.  I have discussed this patient with the house staff team including the resident and medical student and I agree with the findings and plan in this note.    I have reviewed today's vital signs, medications, labs and imaging. Marian Plascencia MD             Labs/Imaging:   TSH 2.33  EtOH <0.01  Acetaminophen <2, Salicylate <2  BMP wnl  CBC with WBC of 14.1, otherwise wnl  HCG negative  Utox negative  EKG with NSR,

## 2018-05-01 NOTE — PROGRESS NOTES
"Maddy was present in the milieu but did not attend community meeting. She was withdrawn and had a blunted affect. She denied any depression and when asked about anxiety, she stated \"I don't know I think I just need to take my meds.\" Maddy denied symptoms of SI/SIB, AH & VH. She only stated that she felt fatigued and had taken many naps throughout the day.      04/30/18 2141   Behavioral Health   Hallucinations denies / not responding to hallucinations   Thinking poor concentration   Orientation person: oriented;place: oriented   Memory baseline memory   Insight poor   Judgement impaired   Eye Contact at examiner   Affect blunted, flat   Mood mood is calm   Physical Appearance/Attire neat   Hygiene well groomed   Suicidality other (see comments)  (Denies)   1. Wish to be Dead No   2. Non-Specific Active Suicidal Thoughts  No   Self Injury other (see comment)  (Denies)   Elopement (Not current concern)   Activity restless   Speech clear;coherent   Medication Sensitivity no stated side effects;no observed side effects   Psychomotor / Gait balanced;steady   Psycho Education   Type of Intervention 1:1 intervention   Response participates with encouragement   Hours 0.5   Treatment Detail check in   Activities of Daily Living   Hygiene/Grooming independent   Oral Hygiene independent   Dress independent   Room Organization independent       "

## 2018-05-01 NOTE — PLAN OF CARE
"Problem: Cognitive Impairment (Psychotic Signs/Symptoms) (Adult)  Goal: Improved Thought Clarity/Organization (Psychotic Signs/Symptoms)  Outcome: Improving  \"I think I'm better. I don't feel the negative feelings as much\". Denies depression. C/o anxiety. Patient states attends most the groups and is social with peers. Riding bike in [place] this morning.       "

## 2018-05-01 NOTE — PROGRESS NOTES
Acknowledgement of Current Treatment Plan       I have reviewed my treatment plan with my therapist / counselor on 5/15/2018. I agree with the plan as it is written in the electronic health record.    Name Signature   Maddy Ortiz    Name of Therapist / Counselor     Treatment Team - Adult Day Treatment Program    CARLOS Patel,  [phone]  Marlo Omalley RN   [phone]  Jacek Duke. DARNELL., Licensed Psychologist,  [phone]

## 2018-05-02 PROCEDURE — 25000132 ZZH RX MED GY IP 250 OP 250 PS 637

## 2018-05-02 PROCEDURE — 99232 SBSQ HOSP IP/OBS MODERATE 35: CPT | Mod: GC | Performed by: PSYCHIATRY & NEUROLOGY

## 2018-05-02 PROCEDURE — 12400007 ZZH R&B MH INTERMEDIATE UMMC

## 2018-05-02 RX ORDER — FLUTICASONE PROPIONATE 50 MCG
1-2 SPRAY, SUSPENSION (ML) NASAL DAILY PRN
Qty: 1 BOTTLE | Refills: 0 | Status: SHIPPED | OUTPATIENT
Start: 2018-05-02 | End: 2018-05-03

## 2018-05-02 RX ORDER — TRAZODONE HYDROCHLORIDE 50 MG/1
50 TABLET, FILM COATED ORAL
Qty: 30 TABLET | Refills: 0 | Status: SHIPPED | OUTPATIENT
Start: 2018-05-02 | End: 2018-05-03

## 2018-05-02 RX ORDER — HALOPERIDOL 1 MG/1
7 TABLET ORAL
Qty: 210 TABLET | Refills: 0 | Status: SHIPPED | OUTPATIENT
Start: 2018-05-03 | End: 2018-05-03

## 2018-05-02 RX ORDER — ALBUTEROL SULFATE 90 UG/1
2 AEROSOL, METERED RESPIRATORY (INHALATION) EVERY 4 HOURS PRN
Qty: 1 INHALER | Refills: 0 | Status: SHIPPED | OUTPATIENT
Start: 2018-05-02 | End: 2018-05-03

## 2018-05-02 RX ORDER — CETIRIZINE HYDROCHLORIDE 10 MG/1
10 TABLET ORAL DAILY PRN
Qty: 30 TABLET | Refills: 0 | Status: SHIPPED | OUTPATIENT
Start: 2018-05-02 | End: 2018-05-03

## 2018-05-02 RX ORDER — CLONAZEPAM 0.5 MG/1
0.5 TABLET ORAL 2 TIMES DAILY PRN
Qty: 60 TABLET | Refills: 0 | Status: SHIPPED | OUTPATIENT
Start: 2018-05-02 | End: 2018-05-03

## 2018-05-02 RX ORDER — HYDROXYZINE HYDROCHLORIDE 25 MG/1
25 TABLET, FILM COATED ORAL EVERY 4 HOURS PRN
Qty: 120 TABLET | Refills: 0 | Status: SHIPPED | OUTPATIENT
Start: 2018-05-02 | End: 2018-05-03

## 2018-05-02 RX ADMIN — HALOPERIDOL 7 MG: 5 TABLET ORAL at 18:44

## 2018-05-02 RX ADMIN — TRAZODONE HYDROCHLORIDE 50 MG: 50 TABLET ORAL at 19:26

## 2018-05-02 RX ADMIN — TRAZODONE HYDROCHLORIDE 50 MG: 50 TABLET ORAL at 18:49

## 2018-05-02 RX ADMIN — CLONAZEPAM 0.5 MG: 0.5 TABLET ORAL at 18:45

## 2018-05-02 ASSESSMENT — ACTIVITIES OF DAILY LIVING (ADL)
ORAL_HYGIENE: INDEPENDENT
DRESS: SCRUBS (BEHAVIORAL HEALTH)
GROOMING: INDEPENDENT
DRESS: INDEPENDENT
ORAL_HYGIENE: INDEPENDENT
LAUNDRY: WITH SUPERVISION
LAUNDRY: UNABLE TO COMPLETE
GROOMING: INDEPENDENT

## 2018-05-02 NOTE — PROGRESS NOTES
"Pt was visibly restless, in and out of her room. Pt appears paranoid of another pt, she approached staff and stated \"I just want you to know she's bothering me\", and requested to discharge. When ask how other pt was bothering her pt did not elaborate. Pt later approached staff again and stated that the pt was still bothering her, this instance she elaborated by stating the pt was \"sniffing\" her when they crossed paths. Pt said she was not reporting this concern because she was angry, just worried because it has happened to her before. She stated she was fired from [employer] because she reported a co-worker \"sniffing\" her. Pt denies SI/SIB, hallucinations, and medication side affects. No further concerns or issues to report.     05/02/18 1029   Behavioral Health   Hallucinations denies / not responding to hallucinations   Thinking distractable;poor concentration   Orientation person: oriented;place: oriented;date: oriented   Memory baseline memory   Insight poor   Judgement impaired   Eye Contact at examiner   Affect tense;blunted, flat   Mood anxious   Physical Appearance/Attire attire appropriate to age and situation   Hygiene well groomed   Suicidality other (see comments)  (denies )   1. Wish to be Dead No   2. Non-Specific Active Suicidal Thoughts  No   Self Injury other (see comment)  (denies )   Elopement (No current concerns )   Activity restless   Speech coherent;clear   Medication Sensitivity no stated side effects;no observed side effects   Psychomotor / Gait balanced;steady   Psycho Education   Type of Intervention 1:1 intervention   Response participates with cues/redirection   Hours 0.5   Treatment Detail check in    Activities of Daily Living   Hygiene/Grooming independent   Oral Hygiene independent   Dress independent   Laundry with supervision   Room Organization independent   Activity   Activity Assistance Provided independent     "

## 2018-05-02 NOTE — PROGRESS NOTES
"  ----------------------------------------------------------------------------------------------------------  [facility], [place]   Psychiatric Progress Note  Hospital Day #9     Interim History:   The patient's care was discussed with the treatment team and chart notes were reviewed.  Sleep: 6.5 hours  PRNs: Klonopin x1     Staff report: Patient reports doing better, fewer negative feelings, no depression.  Endorses some anxiety.  Attends groups, social.  Asked for her meds to be dispensed earlier.    Patient interview: The patient was interviewed in the conference room this morning. Pt reported doing well today, mood \"OK\", and looked forward to discharging tomorrow evening.  Pt plans to attend Day Tx after d/c.  Pt had no further questions or concerns.    Pt gave verbal approval for team to talk with her boyfriend Jay Jay Beatty in order to coordinate her d/c.  Jay Jay stated that he has some minor questions about medications but can speak with the team tomorrow, and plans to pick pt up around 6pm.    The risks, benefits, alternatives and side effects of any medication changes have been discussed and are understood by the patient and other caregivers.         Psychiatric Examination:   /81  Pulse 92  Temp 97.7  F (36.5  C) (Tympanic)  Resp 16  Ht 1.6 m (5' 3\")  Wt 87.5 kg (193 lb)  SpO2 97%  BMI 34.19 kg/m2  Weight is 193 lbs 0 oz  Body mass index is 34.19 kg/(m^2).    Appearance:  awake, alert, well groomed, wearing hospital scrubs  Attitude: Cooperative   Eye Contact:  good  Mood:  \"OK\"  Affect:  mood congruent, constricted  Speech:  clear, coherent, normal rate and prosody  Psychomotor Behavior:  no evidence of tardive dyskinesia, dystonia, or tics  Thought Process: Some disorganization  Associations:  No loosening of associations  Thought Content:  Cassidy SI, HI, VH, and AH. Paranoia about her neighbors  Insight:  fair  Judgment:  fair  Oriented to:  time, person, and " "place  Attention Span and Concentration:  fair  Recent and Remote Memory:  intact  Language:  english with appropriate syntax and vocabulary  Fund of Knowledge: appropriate  Muscle Strength and Tone: grossly normal  Gait and Station: Normal     Assessment    Diagnostic Impression: Maddy Ortiz is a 32 year old female with history of schizoaffective disorder, bipolar type who was admitted with psychosis, paranoia, and threats of self harm (cutting nose) in the context of possible medication non-adherence. Family history is unknown, as the patient is adopted, and substance use does not appear to be contributing. The patient's last hospitalization was in 2013 at [facility] with sofía and paranoia. She had previously been on higher doses of Geodon, but she has decreased this gradually due to concerns of side effects. The patient's family does not believe she has been taking her medications recently. MSE is notable for the presence of unspecified \"dread\", a mood incongruent affect, and loosening of associations. Family notes the patient has been paranoid, but the patient denies this. Presentation is consistent with her historical diagnosis of schizoaffective disorder, bipolar type.      Reason for inpatient hospitalization is psychosis, paranoia, and threats of self-harm.     Hospital course: Maddy Ortiz was admitted to station 22 as a voluntary patient. The patient expressed desire to switch from Geodon to a different medication, as she was thinking about becoming pregnant, and she was under the impression that Geodon could cause harm during pregnancy. Haldol was initiated, and titrated to a final dose of 7 mg HS. During her initial presentation, she was disorganized and paranoid with clear loosening of association. After the above medication changes, she was more organized during our interviews, and loosening of associations was no longer apparent. She also ceased voicing concerns about her previous paranoid delusions. She " expressed a good deal of anxiety, so Klonopin 0.5 mg bid prn was initiated with good effect.     Medical course: N/A    Plan     Principal psychiatric diagnosis:   # Schizoaffective disorder, bipolar type      Secondary psychiatric diagnoses:  # H/O borderline personality disorder, per chart review      Medications:   -Haloperidol 7 mg daily with supper  -Hydroxyzine 25 mg q4 hours prn for anxiety  -Trazodone 50 mg HS prn  -Klonopin 0.5 mg bid prn       Laboratory/Imaging: None      Legal Status: Voluntary      Safety Assessment:     Behavioral Checks      Routine Programming      Code 1 - Restrict to Unit      Status 15    - Patient will be treated in therapeutic milieu with appropriate individual and group therapies as described.    Medical diagnoses to be addressed this admission:   # Allergies   - Continue PTA Ceterizine and flonase      # Asthma  - albuterol prn    Anticipated Disposition/Discharge Date: 5/3/18 in evening, then followed by day treatment      -------------------------------------------------------------------------------------    This documentation accurately reflects the services I personally performed and treatment decisions made by me in consultation with the attending physician, Dr. Plascencia.    Jay Jay Dumont MD, PGY-2 Psychiatry resident  Pager [phone]    Attestation:  This patient has been seen and evaluated by me, Marian Plascencia.  I have discussed this patient with the house staff team including the resident and medical student and I agree with the findings and plan in this note.    I have reviewed today's vital signs, medications, labs and imaging. Marian Plascencia MD             Labs/Imaging:   TSH 2.33  EtOH <0.01  Acetaminophen <2, Salicylate <2  BMP wnl  CBC with WBC of 14.1, otherwise wnl  HCG negative  Utox negative  EKG with NSR,   CMP wnl except Ca 8.0 (L) , glucose 123 (H)

## 2018-05-02 NOTE — PROGRESS NOTES
05/01/18 2140   Behavioral Health   Thinking distractable;poor concentration   Insight poor   Judgement impaired   Eye Contact out of corner of eyes   Affect tense   Mood anxious   Physical Appearance/Attire attire appropriate to age and situation   Hygiene neglected grooming - unclean body, hair, teeth   Activity restless;withdrawn   Speech coherent   Psycho Education   Type of Intervention 1:1 intervention   Response participates with cues/redirection   Group Therapy Session   Group Attendance refused to attend group session   Group Type community   Activities of Daily Living   Hygiene/Grooming independent   Pt presents with blunted affect. She is anxious on approach. Guarded and distracted during 1:1 interview attempt. Pt would walk away mid sentence. Pt is pleasant. She complains of anxiety, seeking prn medications. Pt is isolative to self, mostly in room. She is restless, often walking the halls. Pt states no further concerns to this writer.

## 2018-05-03 VITALS
DIASTOLIC BLOOD PRESSURE: 71 MMHG | WEIGHT: 192 LBS | TEMPERATURE: 97.4 F | RESPIRATION RATE: 16 BRPM | BODY MASS INDEX: 34.02 KG/M2 | HEIGHT: 63 IN | HEART RATE: 86 BPM | SYSTOLIC BLOOD PRESSURE: 108 MMHG | OXYGEN SATURATION: 97 %

## 2018-05-03 PROCEDURE — 97150 GROUP THERAPEUTIC PROCEDURES: CPT | Mod: GO

## 2018-05-03 PROCEDURE — 90791 PSYCH DIAGNOSTIC EVALUATION: CPT

## 2018-05-03 PROCEDURE — 99238 HOSP IP/OBS DSCHRG MGMT 30/<: CPT | Mod: GC | Performed by: PSYCHIATRY & NEUROLOGY

## 2018-05-03 PROCEDURE — 25000132 ZZH RX MED GY IP 250 OP 250 PS 637

## 2018-05-03 RX ORDER — CLONAZEPAM 0.5 MG/1
0.5 TABLET ORAL 2 TIMES DAILY PRN
Qty: 60 TABLET | Refills: 0 | Status: SHIPPED | OUTPATIENT
Start: 2018-05-03 | End: 2018-05-15

## 2018-05-03 RX ORDER — CLONAZEPAM 0.5 MG/1
0.5 TABLET ORAL 2 TIMES DAILY PRN
Qty: 60 TABLET | Refills: 0 | Status: SHIPPED | OUTPATIENT
Start: 2018-05-03 | End: 2018-05-03

## 2018-05-03 RX ORDER — HALOPERIDOL 1 MG/1
7 TABLET ORAL
Qty: 210 TABLET | Refills: 0 | Status: SHIPPED | OUTPATIENT
Start: 2018-05-03 | End: 2018-05-03

## 2018-05-03 RX ORDER — LANOLIN ALCOHOL/MO/W.PET/CERES
3 CREAM (GRAM) TOPICAL
Qty: 30 TABLET | Refills: 0 | Status: SHIPPED | OUTPATIENT
Start: 2018-05-03 | End: 2018-05-15

## 2018-05-03 RX ORDER — ALBUTEROL SULFATE 90 UG/1
2 AEROSOL, METERED RESPIRATORY (INHALATION) EVERY 4 HOURS PRN
Qty: 1 INHALER | Refills: 0 | Status: SHIPPED | OUTPATIENT
Start: 2018-05-03 | End: 2018-05-03

## 2018-05-03 RX ORDER — TRAZODONE HYDROCHLORIDE 50 MG/1
50 TABLET, FILM COATED ORAL
Qty: 30 TABLET | Refills: 0 | Status: SHIPPED | OUTPATIENT
Start: 2018-05-03 | End: 2018-05-15

## 2018-05-03 RX ORDER — HALOPERIDOL 1 MG/1
7 TABLET ORAL
Qty: 210 TABLET | Refills: 0 | Status: SHIPPED | OUTPATIENT
Start: 2018-05-03 | End: 2018-05-15

## 2018-05-03 RX ORDER — CETIRIZINE HYDROCHLORIDE 10 MG/1
10 TABLET ORAL DAILY PRN
Qty: 30 TABLET | Refills: 0 | Status: SHIPPED | OUTPATIENT
Start: 2018-05-03 | End: 2018-05-03

## 2018-05-03 RX ORDER — CETIRIZINE HYDROCHLORIDE 10 MG/1
10 TABLET ORAL DAILY PRN
Qty: 30 TABLET | Refills: 0 | Status: SHIPPED | OUTPATIENT
Start: 2018-05-03 | End: 2018-05-23

## 2018-05-03 RX ORDER — FLUTICASONE PROPIONATE 50 MCG
1-2 SPRAY, SUSPENSION (ML) NASAL DAILY PRN
Qty: 1 BOTTLE | Refills: 0 | Status: SHIPPED | OUTPATIENT
Start: 2018-05-03 | End: 2018-05-03

## 2018-05-03 RX ORDER — HYDROXYZINE HYDROCHLORIDE 25 MG/1
25 TABLET, FILM COATED ORAL EVERY 4 HOURS PRN
Qty: 120 TABLET | Refills: 0 | Status: SHIPPED | OUTPATIENT
Start: 2018-05-03 | End: 2018-05-03

## 2018-05-03 RX ORDER — FLUTICASONE PROPIONATE 50 MCG
1-2 SPRAY, SUSPENSION (ML) NASAL DAILY PRN
Qty: 1 BOTTLE | Refills: 0 | Status: SHIPPED | OUTPATIENT
Start: 2018-05-03 | End: 2018-05-15

## 2018-05-03 RX ORDER — TRAZODONE HYDROCHLORIDE 50 MG/1
50 TABLET, FILM COATED ORAL
Qty: 30 TABLET | Refills: 0 | Status: SHIPPED | OUTPATIENT
Start: 2018-05-03 | End: 2018-05-03

## 2018-05-03 RX ORDER — HYDROXYZINE HYDROCHLORIDE 25 MG/1
25 TABLET, FILM COATED ORAL EVERY 4 HOURS PRN
Qty: 120 TABLET | Refills: 0 | Status: SHIPPED | OUTPATIENT
Start: 2018-05-03 | End: 2018-05-15

## 2018-05-03 RX ORDER — ALBUTEROL SULFATE 90 UG/1
2 AEROSOL, METERED RESPIRATORY (INHALATION) EVERY 4 HOURS PRN
Qty: 1 INHALER | Refills: 0 | Status: SHIPPED | OUTPATIENT
Start: 2018-05-03 | End: 2018-05-15

## 2018-05-03 RX ADMIN — HALOPERIDOL 7 MG: 5 TABLET ORAL at 17:23

## 2018-05-03 ASSESSMENT — ACTIVITIES OF DAILY LIVING (ADL)
DRESS: INDEPENDENT
LAUNDRY: WITH SUPERVISION
GROOMING: INDEPENDENT
ORAL_HYGIENE: INDEPENDENT

## 2018-05-03 NOTE — DISCHARGE SUMMARY
"    ----------------------------------------------------------------------------------------------------------  [facility], [place]   Discharge Summary      Maddy Ortiz MRN# [id number]   Age: 32 year old YOB: 1985     Date of Admission:  4/23/2018  Date of Discharge:  5/3/2018  Admitting Physician:  Marian Plascencia MD  Discharge Physician:  Marian Plascencia MD         Event Leading to Hospitalization:   This patient is a 32 year old female with historical diagnoses of of schizoaffective disorder, delusional disorder, and bipolar 1 disorder with who presented with psychotic symptoms and threats of self harm on 04/23/2018.    Per chart review and the patient's father, there has been increasing concern for for unusual behavior the patient has been exhibiting. She has been increasingly paranoid, believing the neighbors are giving her dirty looks. She has made comments suggesting that she is suspicious of nearly everyone except her daughter and her boyfriend. When visiting the patient's boyfriends apartment, the patient asked to leave because \"they might see us here\". She appears to see a lot of danger in situations in which there is none. She believed that others at school were not \"perceiving\" things about her correctly, so she dropped out, even though she is just a few credits short of a degree. She turned off her GPS when driving to [place] out of concern that she might be monitored, and got lost as a result. 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 On a scale 1-5, the patient's father believes her functioning is a \"-2\". The patient's daughter is currently staying with her grandparents. He believes the patient discontinued her medications some time ago because someone told her they can cause heart failure. Per the grandparents, she appears to be doing an " "adequate job taking care of her daughter, and they had no concern for safety to self or others. Of note, she told the DEC  that she had thoughts of cutting her nose.    Upon interview, the patient is cooperative and pleasant, though is guarded in select parts. She also laughs and smiles inappropriately throughout the interview. Patient reports that she came into the hospital due to increasing feelings of \"dread.\" She describes the dread as \"an awful feeling of unease.\" She says that it \"overwhelmed\" her. It started last night when she couldn't get to sleep and then was worse when she woke up in the morning. She says that she needs her medications adjusted. When asked about if she thinks something bad will happen she says yes, but when asked what bad thing will happen she says, \"I don't want to say.\" Notably when she does not want to share she giggles somewhat inappropriately. Says \"time\" makes her dread worse but can't explain how. Says nothing makes it better. When asked about recent stressors she denies any increased stress, though does allow some relationship issues. When asked to clarify she says \"I don't know what to say.\" Says that her and her partner are planning a pregnancy so she would like to switch to a different medication besides geodon, but says that getting rid of the \"dread\" is the priority. Says she is sensitive to medications and gets side effects of \"my face feels strange and I get nauseous\" when she takes too much geodon. She can also get palpitations when the dose is high. She does not think right now is the best time to get pregnant because she thinks the dread might worsen.      When asked if she fears for her safety, she denies it. When asked if she feels her family has been replaced she says, \"I don't want to say.\" Denies thought insertion but says as a child she thought people could read her thoughts. Does not think that now. Denies auditory or visual hallucinations. No ideas of " "reference. Has not been working recently due to not being assigned hours. When asked about how she is managing financially she says, \"I don't know what to say. I manage my own finances.\" Says she is a student at [employer] of [employer], however, states that she is on a leave of absence. She denies any history of going days without sleep and high energy, feeling grandiose, having racing thoughts or distractibility. 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 Says she wants to take a SBA class before getting started. Describes her mood as \"dread.\" Says she feels nostalgic about family members that have passed away. Denies feeling guilty but has some regret about taking out student loans. Says she is \"impatient\" about the loans, \"other people aren't as serious.\" Had difficulty sleeping last night and took some Nyquil to help. Normally she takes melatonin, but when that didn't work she took Nyquil. Feels her focus and energy are good. Denies suicidal or homicidal ideation. When asked about impulsive behaviors like spending lots of money she says she has recently, \"I spend a lot of money recently on stuff for my house.\" When asked why she says, \"I felt bullied and afraid.\" Is unable to clearly explain how the two relate but says, \"I wanted to exercise my purchasing power.\" States that she threatened self harm of cutting the bridge of her nose in an argument with her partner yesterday, but says her main intent was to divert his attention. Her goals for this hospitalization are to focus on her mental health and adjust her medications.        See Admission note by Marian Plascencia MD on 5/3/18 for additional details.          Diagnoses:     Principal psychiatric diagnosis:   # Schizoaffective disorder, bipolar type      Secondary psychiatric diagnoses:  # borderline personality disorder (per chart review)    Medical " diagnoses:   # Allergies  # Asthma         Labs:     TSH 2.33  EtOH <0.01  Acetaminophen <2, Salicylate <2  BMP wnl  CBC with WBC of 14.1, otherwise wnl  HCG negative  Utox negative  EKG with NSR,   CMP wnl except Ca 8.0 (L) , glucose 123 (H)         Consults:     No consultations were requested during this admission         Hospital Course:     Maddy Oritz was admitted to [facility] with attending Marian Plascencia MD as a voluntary patient. The patient was placed under status 15 (15 minute checks) to ensure patient safety. CBC, BMP and utox obtained.  Patient  did not require seclusion or administration of emergency medications to manage behavior.    On admission, outpatient medications were continued.    Presentation of current episode: Thus far this hospitalization, the patient expressed desire to switch from Geodon to a different medication, as she was thinking about becoming pregnant, and she was under the impression that Geodon could cause harm during pregnancy. Haldol was initiated, and titrated to a final dose of 7 mg HS. During her initial presentation, she was disorganized and paranoid with clear loosening of association. After the above medication changes, she was more organized during our interviews, and loosening of associations was no longer apparent. She also ceased voicing concerns about her previous paranoid delusions. She expressed a good deal of anxiety, so Klonopin 0.5 mg bid prn was initiated with good effect.     The patient's symptoms of disorganization, paranoia and anxiety improved.     Maddy Ortiz was discharged to home with her boyfriend Jay Jay Beatty. At the time of discharge Maddy Ortiz was determined to not be a danger to herself or others.     Today Maddy Ortiz reports no SI/SIB. In addition, Maddy Ortiz has notable risk factors for self-harm, including anxiety and previous SA and self-harm. However, risk is mitigated by ability to volunteer a safety plan and history of  seeking help when needed.Additional steps taken to minimize risk include: follow-up with [facility] after d/c. Therefore, based on all available evidence including the factors cited above, Maddy Ortiz does not appear to be at imminent risk for self-harm, and is appropriate for outpatient level of care.     This document serves as a transfer of care to Maddy Ortiz's outpatient providers.         Discharge Medications:     Psychiatry Medications Dose/Frequency    Clonazepam 0.5 mg po BID PRN, anxiety, qty 60    Haldol 7 mg (1mg x 7) po daily with dinner, qty 210    Hydroxyzine 25 mg po q4h prn, anxiety, qty 120    Melatonin 3 mg po QHS prn, sleep, qty 30    Trazodone 50 mg po QHS prn, sleep, qty 30    Non Psychiatry Medications Dose/Frequency    Albuterol 108 mcg inhaler, inhale 2 puffs into lungs q4h prn, wheezing or SOB/dyspnea, qty 1 inhaler    Cetirizine 10 mg po daily PRN, allergies, qty 30    Flonase 50 mcg spray, spray 1-2 sprays into both nostrils daily as needed for rhinitis, qty 1 bottle         Psychiatric Examination:     Appearance:  awake, alert, adequately groomed, dressed in hospital scrubs, appeared as age stated, cooperative, no apparent distress and mildly obese  Attitude:  cooperative  Eye Contact:  good  Mood:  good  Affect:  appropriate and in normal range and mood congruent  Speech:  clear, coherent and normal prosody  Psychomotor Behavior:  no evidence of tardive dyskinesia, dystonia, or tics and intact station, gait and muscle tone  Thought Process:  linear and goal oriented  Associations:  no loose associations  Thought Content:  no evidence of suicidal ideation or homicidal ideation and no evidence of psychotic thought  Insight:  fair  Judgment:  fair  Oriented to:  time, person, and place  Attention Span and Concentration:  fair  Recent and Remote Memory:  intact  Language: Fluent in conversational English   Fund of Knowledge: appropriate  Muscle Strength and Tone: grossly normal  Gait  and Station: Normal         Discharge Plan:     Day Treatment Monday, Tuesday, Thursday 1-4pm   General acute hospital Day Treatment Located in [facility] Floor [address] ; [address] [place] [address] Phone:[phone]    Rides: You will be picked up at your home at 12pm by [employer] [phone] on Monday, Tuesday, Thursday  Any questions regarding rides or to schedule additional medical rides call [phone]     Date/Time: Friday May 18th 1:30pm    Provider: Dr. Rachid Rodriguez   Address: [facility]  [phone]  Fax: [phone]       This documentation accurately reflects the services I personally performed and treatment decisions made by me in consultation with the attending physician, Marian Plascencia MD.    Jay Jay Dumont MD, PGY-2 Psychiatry resident  Pager [phone]    Attestation:   The patient has been seen and evaluated by me,  Marian Plascencia. I have examined the patient today and reviewed the discharge plan with the resident and medical student. I agree with the final assessment and plan, as noted in the discharge summary. I have reviewed today's vital signs, medications, labs and imaging.  Total time discharge plannin minutes  Marian Plascencia MD

## 2018-05-03 NOTE — TELEPHONE ENCOUNTER
----- Message from Sridevi Aldana sent at 5/3/2018  1:18 PM CDT -----  [facility]  Contact: [phone]  Hi everyone,     Maddy is going to be joining the 4C track when she discharges from inpatient. We are not certain when that will be but she is willing to start right after. She has Blue [employer] [facility] for insurance, no authorization is required.     Thank you,    Sofie Aldana

## 2018-05-03 NOTE — PROGRESS NOTES
"Pt. Was visible in the milieu and social with others. She needed redirection around boundaries at times, for example, she offered for another pt. To come live with her and her boyfriend. Pt. Continues to express that other female Pt.s are \"bothering\" her through \"non-verbal ways.\" She appears to be somewhat paranoid. Denies SI/SIB.       05/02/18 3421   Behavioral Health   Hallucinations denies / not responding to hallucinations   Thinking delusional;paranoid   Orientation person: oriented;place: oriented;date: oriented   Memory baseline memory   Insight poor   Judgement impaired   Eye Contact at examiner   Affect full range affect   Mood mood is calm   Physical Appearance/Attire attire appropriate to age and situation   Hygiene well groomed   Suicidality other (see comments)  (denies)   1. Wish to be Dead No   2. Non-Specific Active Suicidal Thoughts  No   Self Injury other (see comment)  (denies)   Elopement (Pt. did not make statements about wanting to leave.)   Activity other (see comment)  (Visible in the milieu, social engaged with others.)   Speech clear;coherent   Medication Sensitivity no stated side effects;no observed side effects   Psychomotor / Gait balanced;steady   Coping/Psychosocial   Verbalized Emotional State hopefulness   Psycho Education   Type of Intervention 1:1 intervention   Response participates, initiates socially appropriate   Hours 0.5   Treatment Detail 1:1 check-in   Activities of Daily Living   Hygiene/Grooming independent   Oral Hygiene independent   Dress scrubs (behavioral health)   Laundry unable to complete   Room Organization independent     "

## 2018-05-03 NOTE — DISCHARGE INSTRUCTIONS
Behavioral Discharge Planning and Instructions      Summary:  You were admitted on 4/23/2018  due to Psychotic Symptomology.  You were treated by Dr. Marian Plascencia MD and discharged on 5/3/18 from [facility]   to Home      Principal Diagnosis: Schizoaffective      Health Care Follow-up Appointments:     Day Treatment Monday, Tuesday, Thursday 1-4pm   [facility] Day Treatment Located in [facility] Floor [facility] ; [address] [place] [address] Phone:[phone]  Rides: You will be picked up at your home at 12pm by [employer] [phone] on Monday, Tuesday, Thursday  Any questions regarding rides or to schedule additional medical rides call [phone]    Date/Time: Friday May 18th 1:30pm    Provider: Dr. Rachid Rodriguez   Address[facility]  [phone]  Fax: [phone]    Attend all scheduled appointments with your outpatient providers. Call at least 24 hours in advance if you need to reschedule an appointment to ensure continued access to your outpatient providers.   Major Treatments, Procedures and Findings:  You were provided with: a psychiatric assessment, medication evaluation and/or management and milieu management    Symptoms to Report: feeling more aggressive, increased confusion, mood getting worse or thoughts of suicide    Early warning signs can include: increased depression or anxiety increased thoughts or behaviors of suicide or self-harm  increased unusual thinking, such as paranoia or hearing voices    Safety and Wellness:  Take all medicines as directed.  Make no changes unless your doctor suggests them.      Follow treatment recommendations.  Refrain from alcohol and non-prescribed drugs.  If there is a concern for safety, call 911.    Resources:   Crisis Intervention: 283.378.3835 or 960-818-3676 (TTY: 620.818.2643).  Call anytime for help.  National [place] on Mental Illness  "(www.[place].vincent.org): 142.635.5556 or 992-566-4755.  National Suicide Prevention Line (www.mentalhealthmn.org): 459-341-YLWP (8079)  Mental Health Consumer/Survivor Network of [place] (www.mhcsn.net): 802.502.3239 or 393-836-1917  Mental Health Association of [place] (www.mentalhealth.org): 955.910.7959 or 917-696-1273  Self- Management and Recovery Training., SMART-- Toll free: 676.393.2775  www.[employer].QponDirect  [facility] Crisis (COPE) Response - Adult 209 842-2117  Text 4 Life: txt \"LIFE\" to 55258 for immediate support and crisis intervention  Crisis text line: Text \"[place]\" to 596030. Free, confidential, 24/7.  Crisis Intervention: 320.313.8794 or 719-298-6001. Call anytime for help.   [facility] Mental Health Crisis Team - Child: [phone]    The treatment team has appreciated the opportunity to work with you.     If you have any questions or concerns our unit number is [phone]  You may be receiving a follow-up phone call within the next three days from a representative from behavioral health.          "

## 2018-05-03 NOTE — PROGRESS NOTES
Pt was visible in the milieu, restless, and social with peers. Pt attended groups and participated. She is discharging this evening and looking forward to it. Pt stated she feels anxious, although it may be because she is ready to discharge. Pt denies SI/SIB, hallucinations, and medication side affects.      05/03/18 1248   Behavioral Health   Hallucinations denies / not responding to hallucinations   Thinking distractable;poor concentration;paranoid   Orientation person: oriented;place: oriented;date: oriented;time: oriented   Memory baseline memory   Insight poor   Judgement impaired   Eye Contact at examiner   Affect full range affect   Mood mood is calm   Physical Appearance/Attire attire appropriate to age and situation   Hygiene well groomed   Suicidality other (see comments)  (denies)   1. Wish to be Dead No   2. Non-Specific Active Suicidal Thoughts  No   Self Injury other (see comment)  (denies )   Elopement (No current concerns )   Activity other (see comment)  (visible, social )   Speech clear;coherent   Medication Sensitivity no stated side effects;no observed side effects   Psychomotor / Gait steady;balanced   Psycho Education   Type of Intervention 1:1 intervention   Response participates, initiates socially appropriate   Hours 0.5   Treatment Detail check in    Activities of Daily Living   Hygiene/Grooming independent   Oral Hygiene independent   Dress independent   Laundry with supervision   Room Organization independent   Activity   Activity Assistance Provided independent

## 2018-05-03 NOTE — PROGRESS NOTES
Pt discharged to home in stable condition accompanied by her boyfriend . Pt given discharge instructions and appointments and discharge meds

## 2018-05-04 ENCOUNTER — OFFICE VISIT (OUTPATIENT)
Dept: FAMILY MEDICINE | Facility: CLINIC | Age: 33
End: 2018-05-04
Payer: COMMERCIAL

## 2018-05-04 ENCOUNTER — TELEPHONE (OUTPATIENT)
Dept: FAMILY MEDICINE | Facility: CLINIC | Age: 33
End: 2018-05-04

## 2018-05-04 VITALS
WEIGHT: 193 LBS | HEIGHT: 65 IN | TEMPERATURE: 98.1 F | DIASTOLIC BLOOD PRESSURE: 82 MMHG | BODY MASS INDEX: 32.15 KG/M2 | HEART RATE: 73 BPM | SYSTOLIC BLOOD PRESSURE: 124 MMHG

## 2018-05-04 DIAGNOSIS — R63.5 ABNORMAL WEIGHT GAIN: Primary | ICD-10-CM

## 2018-05-04 PROCEDURE — 99213 OFFICE O/P EST LOW 20 MIN: CPT | Performed by: FAMILY MEDICINE

## 2018-05-04 NOTE — MR AVS SNAPSHOT
After Visit Summary   5/4/2018    Maddy Ortiz    MRN: [id number]           Patient Information     Date Of Birth          1985        Visit Information        Provider Department      5/4/2018 7:40 AM Deni Fernandez MD [facility]        Today's Diagnoses     Abnormal weight gain    -  1       Follow-ups after your visit        Additional Services     BARIATRIC ADULT REFERRAL       Your provider has referred you to: [facility]: Medical [facility] [phone]. [email or web address]    Please be aware that coverage of these services is subject to the terms and limitations of your health insurance plan.  Call member services at your health plan with any benefit or coverage questions.      Please bring the following with you to your appointment:      (1) List of current medications   (2) This referral request   (3) Any documents/labs given to you for this referral                  Your next 10 appointments already scheduled     May 15, 2018  7:45 AM CDT   (Arrive by 7:30 AM)   New Visit with PHILL Kelley [facility] ([facility])    [address] [address] [phone]              Who to contact     If you have questions or need follow up information about today's clinic visit or your schedule please contact [facility] directly at [phone].  Normal or non-critical lab and imaging results will be communicated to you by MyChart, letter or phone within 4 business days after the clinic has received the results. If you do not hear from us within 7 days, please contact the clinic through MyChart or phone. If you have a critical or abnormal lab result, we will notify you by phone as soon as possible.  Submit refill requests through M[employer] or call your pharmacy and they will forward the refill  "request to us. Please allow 3 business days for your refill to be completed.          Additional Information About Your Visit        MetaCerthart Information     Olo lets you send messages to your doctor, view your test results, renew your prescriptions, schedule appointments and more. To sign up, go to [email or web address] . Click on \"Log in\" on the left side of the screen, which will take you to the Welcome page. Then click on \"Sign up Now\" on the right side of the page.     You will be asked to enter the access code listed below, as well as some personal information. Please follow the directions to create your username and password.     Your access code is: [email or web address]  Expires: 2018  3:10 PM     Your access code will  in 90 days. If you need help or a new code, please call your [facility] clinic or [phone].        Care EveryWhere ID     This is your Care EveryWhere ID. This could be used by other organizations to access your [facility] medical records  [id number]        Your Vitals Were     Pulse Temperature Height BMI (Body Mass Index)          73 98.1  F (36.7  C) (Tympanic) 5' 4.5\" (1.638 m) 32.62 kg/m2         Blood Pressure from Last 3 Encounters:   18 124/82   18 108/71   18 125/89    Weight from Last 3 Encounters:   18 193 lb (87.5 kg)   18 192 lb (87.1 kg)   18 190 lb (86.2 kg)              We Performed the Following     BARIATRIC ADULT REFERRAL        Primary Care Provider Office Phone # Fax #    [facility] [phone] [address] [address]        Equal Access to Services     ARMOND MCKEON : Kamryn Ackerman, pancho rondon, lincoln do. So [facility] [phone].    ATENCIÓN: Si habla español, tiene a titus disposición servicios gratuitos de asistencia lingüística. Yadira yuen [phone].    We comply with applicable federal civil rights " laws and [place] laws. We do not discriminate on the basis of race, color, national origin, age, disability, sex, sexual orientation, or gender identity.            Thank you!     Thank you for choosing [facility]  for your care. Our goal is always to provide you with excellent care. Hearing back from our patients is one way we can continue to improve our services. Please take a few minutes to complete the written survey that you may receive in the mail after your visit with us. Thank you!             Your Updated Medication List - Protect others around you: Learn how to safely use, store and throw away your medicines at www.disposemymeds.org.          This list is accurate as of 5/4/18  7:53 AM.  Always use your most recent med list.                   Brand Name Dispense Instructions for use Diagnosis    albuterol 108 (90 Base) MCG/ACT Inhaler    PROAIR HFA/PROVENTIL HFA/VENTOLIN HFA    1 Inhaler    Inhale 2 puffs into the lungs every 4 hours as needed for wheezing or shortness of breath / dyspnea    Chronic seasonal allergic rhinitis due to pollen       * ALLERGEN IMMUNOTHERAPY PRESCRIPTION     5 mL    Cat Hair, Standardized 10,000 BAU/mL, ALK  3.0 ml Dog Hair Dander, A. P.  1:100 w/v, HS  1.0 ml Dust Mites F 30,000AU/mL, HS  0.5 ml Dust Mites P. 30,000 AU/mL, HS  0.5 ml  Diluent: HSA qs to 5ml    Allergic rhinitis due to animal dander, Allergic rhinitis due to dust mite       * ALLERGEN IMMUNOTHERAPY PRESCRIPTION     5 mL    Alternaria Tenuis 1:10 w/v, HS  0.5 ml Epicoccum Nigrum 1:10 w/v, HS 0.5 ml Hormodendrum Cladosporioides 1:10 w/v, HS 0.5 ml Diluent: HSA qs to 5ml    Allergic rhinitis due to mold       * ALLERGEN IMMUNOTHERAPY PRESCRIPTION     5 mL    Estuardo, White  1:20 w/v, HS  0.5 ml Birch Mix PRW 1:20 w/v, HS  0.5 ml Elm, American 1:20 w/v, HS  0.5 ml Hackberry 1:20 w/v, HS 0.5 ml Hickory, Shagbark 1:20 w/v, HS  0.5 ml Woodlawn Mix RW 1:20 w/v, HS 0.5 ml Oak Mix RVW 1:20 w/v, HS 0.5 ml  [place] Tree, Black 1:20 w/v, HS 0.5 ml Bessemer, Black 1:20 w/v, HS 0.5 ml Diluent: HSA qs to 5ml    Chronic seasonal allergic rhinitis due to pollen       * ALLERGEN IMMUNOTHERAPY PRESCRIPTION     5 mL    Kochia 1:20 w/v, HS 1.0 ml Nettle 1:20 w/v, HS 1.0 ml Plantain, English 1:20 w/v, HS 1.0 ml Ragweed Mixed 1:20 w/v ALK  0.6 ml Russian Thistle 1:20 w/v, HS 1.0 ml Diluent: HSA qs to 5ml    Chronic seasonal allergic rhinitis due to pollen       cetirizine 10 MG tablet    zyrTEC    30 tablet    Take 1 tablet (10 mg) by mouth daily as needed for allergies    Chronic seasonal allergic rhinitis due to pollen       clonazePAM 0.5 MG tablet    klonoPIN    60 tablet    Take 1 tablet (0.5 mg) by mouth 2 times daily as needed for anxiety    Depression with anxiety       cyclobenzaprine 10 MG tablet    FLEXERIL    30 tablet    Take 0.5-1 tablets (5-10 mg) by mouth At Bedtime    Sacral back pain       EPINEPHrine 0.3 MG/0.3ML injection 2-pack    EPIPEN 2-ODETTE    0.6 mL    Inject 0.3 mLs (0.3 mg) into the muscle once as needed for anaphylaxis    Need for desensitization to allergens       fluticasone 50 MCG/ACT spray    FLONASE    1 Bottle    Spray 1-2 sprays into both nostrils daily as needed for rhinitis    Chronic seasonal allergic rhinitis due to pollen       haloperidol 1 MG tablet    HALDOL    210 tablet    Take 7 tablets (7 mg) by mouth daily (with dinner)    Psychosis, unspecified psychosis type       hydrOXYzine 25 MG tablet    ATARAX    120 tablet    Take 1 tablet (25 mg) by mouth every 4 hours as needed for anxiety    Depression with anxiety       levonorgestrel 20 MCG/24HR IUD    MIRENA     1 each (20 mcg) by Intrauterine route continuous    Encounter for insertion of mirena IUD       melatonin 3 MG tablet     30 tablet    Take 1 tablet (3 mg) by mouth nightly as needed for sleep    Insomnia, unspecified type       permethrin 1 % Liqd     60 mL    Apply to clean, towel-dried hair, saturate hair and scalp, wash off  after 10 min.    Lice       traZODone 50 MG tablet    DESYREL    30 tablet    Take 1 tablet (50 mg) by mouth every evening as needed for sleep    Insomnia, unspecified type       * Notice:  This list has 4 medication(s) that are the same as other medications prescribed for you. Read the directions carefully, and ask your doctor or other care provider to review them with you.

## 2018-05-04 NOTE — TELEPHONE ENCOUNTER
"ED/Discharge Protocol    \"Hi, my name is Nicci Cox, a registered nurse, and I am calling on behalf of Dr. Fernandez's office at [facility].  I am calling to follow up and see how things are going for you after your recent visit.\"    \"I see that you were in the (ER/UC/IP) on 4/23/18.    How are you doing now that you are home?\" ok    Is patient experiencing symptoms that may require a hospital visit?  no    Discharge Instructions    \"Let's review your discharge instructions.  What is/are the follow-up recommendations?  Pt. Response: yes    \"Were you instructed to make a follow-up appointment?\"  Pt. Response: No.       \"When you see the provider, I would recommend that you bring your discharge instructions with you.    Medications    \"How many new medications are you on since your hospitalization/ED visit?\"    0-1  \"How many of your current medicines changed (dose, timing, name, etc.) while you were in the hospital/ED visit?\"   0-1  \"Do you have questions about your medications?\"   No  \"Were you newly diagnosed with heart failure, COPD, diabetes or did you have a heart attack?\"   No  For patients on insulin: \"Did you start on insulin in the hospital or did you have your insulin dose changed?\"   No    Medication reconciliation completed? No, due to     Was MTM referral placed (*Make sure to put transitions as reason for referral)?   No    Call Summary    \"Do you have any questions or concerns about your condition or care plan at the moment?\"    Yes I want to have haldol as an injectable to be more accurate.  Triage nurse advice given: to follow up with psych and then get the medication order for the haldol injectable. Let us know so we can set this up for her in the clinic.    Patient was in ER 1 in the past year (assess appropriateness of ER visits.)      \"If you have questions or things don't continue to improve, we encourage you contact us through the main clinic number,  [phone].  Even if the clinic is not " "open, triage nurses are available 24/7 to help you.     We would like you to know that our clinic has extended hours (provide information).  We also have urgent care (provide details on closest location and hours/contact info)\"      \"Thank you for your time and take care!\"        "

## 2018-05-04 NOTE — TELEPHONE ENCOUNTER
ED/UC/IP follow up phone call:   05/03/18  Bipolar 1 Disorder    RN please call to follow up    Number of ED visits in past 12 mths:   1    Ally Gonzalo  Clinic Station

## 2018-05-04 NOTE — TELEPHONE ENCOUNTER
ED / Discharge Outreach Protocol    Patient Contact    Attempt # 1    Was call answered?  No.  Left message on voicemail with information to call me back.    Jaja Ortiz RN on 5/4/2018 at 9:40 AM

## 2018-05-04 NOTE — NURSING NOTE
"Initial /82 (BP Location: Left arm, Cuff Size: Adult Regular)  Pulse 73  Temp 98.1  F (36.7  C) (Tympanic)  Ht 5' 4.5\" (1.638 m)  Wt 193 lb (87.5 kg)  BMI 32.62 kg/m2 Estimated body mass index is 32.62 kg/(m^2) as calculated from the following:    Height as of this encounter: 5' 4.5\" (1.638 m).    Weight as of this encounter: 193 lb (87.5 kg). .      "

## 2018-05-05 ENCOUNTER — NURSE TRIAGE (OUTPATIENT)
Dept: NURSING | Facility: CLINIC | Age: 33
End: 2018-05-05

## 2018-05-06 NOTE — TELEPHONE ENCOUNTER
Patient calls because she would like to donate blood plasma and has a form for her PCP to sign. Patient will call and make an appointment with her PCP and bring form to appointment. Patient agrees with plan.  Additional Information    Negative: [1] Caller is not with the adult (patient) AND [2] reporting urgent symptoms    Negative: Lab result questions    Negative: Medication questions    Negative: Caller cannot be reached by phone    Negative: Caller has already spoken to PCP or another triager    Negative: RN needs further essential information from caller in order to complete triage    Negative: Requesting regular office appointment    Negative: [1] Caller requesting NON-URGENT health information AND [2] PCP's office is the best resource    Health Information question, no triage required and triager able to answer question    Protocols used: INFORMATION ONLY CALL-ADULT-

## 2018-05-07 ENCOUNTER — HOSPITAL ENCOUNTER (OUTPATIENT)
Dept: BEHAVIORAL HEALTH | Facility: CLINIC | Age: 33
End: 2018-05-07
Attending: PSYCHIATRY & NEUROLOGY
Payer: COMMERCIAL

## 2018-05-07 PROCEDURE — H2012 BEHAV HLTH DAY TREAT, PER HR: HCPCS

## 2018-05-07 ASSESSMENT — ANXIETY QUESTIONNAIRES
1. FEELING NERVOUS, ANXIOUS, OR ON EDGE: SEVERAL DAYS
GAD7 TOTAL SCORE: 7
IF YOU CHECKED OFF ANY PROBLEMS ON THIS QUESTIONNAIRE, HOW DIFFICULT HAVE THESE PROBLEMS MADE IT FOR YOU TO DO YOUR WORK, TAKE CARE OF THINGS AT HOME, OR GET ALONG WITH OTHER PEOPLE: VERY DIFFICULT
2. NOT BEING ABLE TO STOP OR CONTROL WORRYING: SEVERAL DAYS
6. BECOMING EASILY ANNOYED OR IRRITABLE: SEVERAL DAYS
3. WORRYING TOO MUCH ABOUT DIFFERENT THINGS: SEVERAL DAYS
5. BEING SO RESTLESS THAT IT IS HARD TO SIT STILL: NOT AT ALL
7. FEELING AFRAID AS IF SOMETHING AWFUL MIGHT HAPPEN: MORE THAN HALF THE DAYS

## 2018-05-07 ASSESSMENT — PATIENT HEALTH QUESTIONNAIRE - PHQ9: 5. POOR APPETITE OR OVEREATING: SEVERAL DAYS

## 2018-05-07 NOTE — TELEPHONE ENCOUNTER
----- Message from SABRA Villaseñor sent at 5/7/2018  9:48 AM CDT -----  Regarding: new start  Maddy will be starting in the 4C track today, 5/7  [place] will be provider  4C meets M,T,Th 1-4  No auth required.  Has [employer]      Thanks!

## 2018-05-07 NOTE — PROGRESS NOTES
"Adult Mental Health Outpatient Group Therapy Progress Note       Name of Group:  4C Group Therapy   Time:            2:00-2:50 pm  Group Therapy      Date:              5/7//2018    Therapist:      Dariusz Spaulding, D,  L.P.          Client Initial Individualized Goals for Treatment:   \"to prepare for a healthy lifestyle\".        Diagnosis  295.70  (F25) Schizoaffective Disorder Bipolar Type      Treatment Goals:   1.  Personal Safety  Report any urges or thoughts to harm yourself to the Tx team.      2.  Self-Support Skills:  practice coping skills/strategies to help   3.  Group Therapy learn and practice 2 skills to manage the anxiety, depression and reduce negative thoughts.   4.  Community Resources:       Area of Treatment Focus:  Symptom Management  Personal Safety      Therapeutic Interventions/Treatment Strategies:  Support, Feedback, Safety Assessments, Structured Activity and Education      Response to Treatment Strategies:  Accepted Feedback, Listened, Attentive, Accepted Support and Alert      Description and Therapeutic Outcome:  4CGroup Therapy   Time:            2:00-2:50 pm  Group Therapy                                 Maddy reported being safe today.  She reported that she has problems with anxiety and panic attacks and had one on the weekend. She stated that she walked to her boyfriend's house and visited with him for a while, and then walked back home. She stated that her anxiety level was still very high. She stated that she had a Diet Coke and a Monster drink and thought that the caffeine may have caused her anxiety.  She talked with the group about the situation, and they helped her problem-solve.  She stated that she listened to a You Tube video on Beethoven and eventually her anxiety level went down. She stated that she felt less anxious at the end of the day, after listening to more Beethoven.  The group talked about music and how important it can be to the mood.      Client " demonstrated understanding of session content by participating in the group therapy discussion, and sharing ideas with others in the group for ways to cope.      Client will benefit from additional opportunities to practice and implement content from this session and receive feedback from the group.        The group practiced 8 minutes of mindfulness.      Is this a Weekly Review of the Progress on the Treatment Plan?  Yes.        Are Treatment Plan Goals being addressed?  Yes, continue treatment goals          Are Treatment Plan Strategies to Address Goals Effective?  Yes, continue treatment strategies          Are there any current contracts in place?  No

## 2018-05-08 ENCOUNTER — HOSPITAL ENCOUNTER (OUTPATIENT)
Dept: BEHAVIORAL HEALTH | Facility: CLINIC | Age: 33
End: 2018-05-08
Attending: PSYCHIATRY & NEUROLOGY
Payer: COMMERCIAL

## 2018-05-08 PROCEDURE — H2012 BEHAV HLTH DAY TREAT, PER HR: HCPCS

## 2018-05-08 ASSESSMENT — ANXIETY QUESTIONNAIRES: GAD7 TOTAL SCORE: 7

## 2018-05-08 ASSESSMENT — PATIENT HEALTH QUESTIONNAIRE - PHQ9: SUM OF ALL RESPONSES TO PHQ QUESTIONS 1-9: 6

## 2018-05-08 NOTE — PROGRESS NOTES
"Adult Mental Health Outpatient Group Therapy Progress Note       Name of Group:  4C Mental Health Management and Group Therapy   Time:            1:00-1:50 and  2:00-2:50 pm  Group Therapy      Date:              5/8//2018    Therapist:      Dariusz Spaulding, D,  L.P.          Client Initial Individualized Goals for Treatment:   \"to prepare for a healthy lifestyle\".        Diagnosis  295.70  (F25) Schizoaffective Disorder Bipolar Type      Treatment Goals:   1.  Personal Safety  Report any urges or thoughts to harm yourself to the Tx team.      2.  Self-Support Skills:  practice coping skills/strategies to help   3.  Group Therapy learn and practice 2 skills to manage the anxiety, depression and reduce negative thoughts.   4.  Community Resources:       Area of Treatment Focus:  Symptom Management  Personal Safety      Therapeutic Interventions/Treatment Strategies:  Support, Feedback, Safety Assessments, Structured Activity and Education      Response to Treatment Strategies:  Accepted Feedback, Listened, Attentive, Accepted Support and Alert      Description and Therapeutic Outcome:  4CGroup Therapy   Time:            1:00 pm - 1:50 pm Mental Health Management and 2:00-2:50 pm  Group Therapy                                 Maddy reported being safe today.  She reported that she was having difficulty with paranoia, comprehending conversations, and that she had an argument with her boyfriend. She started to laugh after this and said she didn't know why she was laughing and apologized.  She said he is mischievious. She stated that he wanted to break-up with her.  She reported feeling anxious and took a sleeping pill last night and slept better.  She reported difficulty with organizing herself at home and getting things done.  She reported that she thought her boyfriend was trying to do something against her.  She reported feeling suspicious about an e-mail from a mindfulness class and unsubscribed from it.  " She said that the instructor sent her a blessing and she didn't like it, and didn't want to be blessed.  She reported that she is working on re-staining a magazine rack, having difficulty and may show it to her daughter's grandpa so that she can get some assistance with removing the stain.    Mental Health Management  The group practiced a 10 minute mindfulness deep breathing exercise.  The group discussed grief and loss issues, triggers to negative thoughts, trauma issues, and how to manage them.  The group discussed emotions that come with grief and loss, and different types of losses.  The group listened to a talk about self-compassion and discussed different aspects of self-compassion.  The group discussed being non-judgmental when observing negative thoughts and managing moods of anxiety, depression, or other emotions.  Client demonstrated understanding of session content by participating in the group therapy discussion, and sharing ideas with others in the group for ways to cope.      Client will benefit from additional opportunities to practice and implement content from this session and receive feedback from the group.        The group practiced 8 minutes of mindfulness.      Is this a Weekly Review of the Progress on the Treatment Plan?  Yes.        Are Treatment Plan Goals being addressed?  Yes, continue treatment goals          Are Treatment Plan Strategies to Address Goals Effective?  Yes, continue treatment strategies          Are there any current contracts in place?  No

## 2018-05-08 NOTE — PROGRESS NOTES
"Adult Mental Health Outpatient Group Therapy Progress Note     Client Initial Individualized Goals for Treatment: \"to prepare for a healthy lifestyle\"      See Initial Treatment suggestions for the client during the time between Diagnostic Assessment and completion of the Master Individualized Treatment Plan.    Treatment Goals:  Ask for more information, support and/or assistance as needed.  Follow up with providers/community supports as needed.  Report increases or changes in symptoms to staff.  Report any personal safety concerns to staff.   Take medications as prescribed.  Report medication changes and/or side effects to staff.  Attend and participate in groups as scheduled or notify staff if unable to do so.  Report any use of substances to staff as this may impact your symptoms and/or personal safety.  Notify staff if you have any other issues that need to be addressed. This may include  any current abuse / neglect / exploitation or other vulnerability.  Follow recommendations of your treatment team and discuss concerns if not in agreement.     Area of Treatment Focus:  Symptom Management    Therapeutic Interventions/Treatment Strategies:  Support, Feedback, Safety Assessments, Structured Activity and Education    Response to Treatment Strategies:  Accepted Feedback, Listened, Attentive, Accepted Support and Alert    Name of Group: Self Support Skills (3:00-3:50)    Description and Outcome:  Client presented with calm,even mood during first session in the Day Treatment Program.Psych education information presented included a discussion related to mental health recovery. Client acknowledged that she has troubles identifying the early warning signs of becoming unwell. She also identified the following treatment goals....... Stress management (\"relax and focus deceasing anxiety\"), decrease procrastination (\"work on projects and work right away\") and improve ability to make better personal decisions( \"prepare " "myself,be independent\").  Client would benefit from additional opportunities to practice and implement content from this session in her every day life and mental health recovery.    Is this a Weekly Review of the Progress on the Treatment Plan?  Yes.      Are Treatment Plan Goals being addressed?  Yes, continue treatment goals      Are Treatment Plan Strategies to Address Goals Effective?  Yes, continue treatment strategies      Are there any current contracts in place?  No      "

## 2018-05-09 NOTE — PROGRESS NOTES
"Adult Mental Health Outpatient Group Therapy Progress Note     Client Initial Individualized Goals for Treatment: \"to prepare for a healthy lifestyle\"      See Initial Treatment suggestions for the client during the time between Diagnostic Assessment and completion of the Master Individualized Treatment Plan.    Treatment Goals:  Ask for more information, support and/or assistance as needed.  Follow up with providers/community supports as needed.  Report increases or changes in symptoms to staff.  Report any personal safety concerns to staff.   Take medications as prescribed.  Report medication changes and/or side effects to staff.  Attend and participate in groups as scheduled or notify staff if unable to do so.  Report any use of substances to staff as this may impact your symptoms and/or personal safety.  Notify staff if you have any other issues that need to be addressed. This may include  any current abuse / neglect / exploitation or other vulnerability.  Follow recommendations of your treatment team and discuss concerns if not in agreement.     Area of Treatment Focus:  Symptom Management    Therapeutic Interventions/Treatment Strategies:  Support, Feedback, Safety Assessments, Structured Activity and Education    Response to Treatment Strategies:  Accepted Feedback, Listened, Attentive, Accepted Support and Alert    Name of Group: Self Support Skills (3:00-3:50)    Description and Outcome:  Client presented with calm,even mood .Psych education information presented included a discussion related to communication with a focus on interpersonal style. Thought process clear,goal directed and reality based.  Client would benefit from additional opportunities to practice and implement content from this session in her every day life and mental health recovery.    Is this a Weekly Review of the Progress on the Treatment Plan?  Yes.      Are Treatment Plan Goals being addressed?  Yes, continue treatment goals      Are " Treatment Plan Strategies to Address Goals Effective?  Yes, continue treatment strategies      Are there any current contracts in place?  No

## 2018-05-10 ENCOUNTER — HOSPITAL ENCOUNTER (OUTPATIENT)
Dept: BEHAVIORAL HEALTH | Facility: CLINIC | Age: 33
End: 2018-05-10
Attending: PSYCHIATRY & NEUROLOGY
Payer: COMMERCIAL

## 2018-05-10 PROCEDURE — H2012 BEHAV HLTH DAY TREAT, PER HR: HCPCS

## 2018-05-10 NOTE — PROGRESS NOTES
Individualized Treatment Plan     Date of Plan: 18    Name: Maddy Ortiz MRN: [id number]    : 1985    Programs:  Adult Day Treatment (DT)     Clinical Track (if applicable):  4C    DSM5 Diagnosis  295.70 (F25)  Schizoaffective Disorder, Bipolar Type    Team Members Contributing to Plan:  4C Treatment Team - Adult Day Treatment Program    CARLOS Patel,  [phone]  Marlo Omalley RN   [phone]  Dariusz Duke, Licensed Psychologist,  [phone]      Client Strengths:  caring, committed to sobriety, creative, educated, empathetic, employed, goal-focused, good listener, has a previous history of therapy, insightful, intelligent, open to learning, open to suggestions / feedback, responsible parent, support of family, friends and providers, supportive, wants to learn, willing to ask questions, willing to relate to others and work history    Client Participation in Plan:  Contributed to goals and plan   Attended individual treatment plan meeting on 7/10/18  Agrees with plan   Received copy of treatment plan   Discussed with staff     Areas of Vulnerability:  Suicidal Ideation   Psychotic symptoms/behavior   Anxiety  Depressive symptoms     Long-Term Goals:  Knowledge about illness and management of symptoms   Maintenance of personal safety   Maintenance of sobriety     Abuse Prevention Plan:  Safe, therapeutic environment   Safety coping plan as needed   Education regarding illness and skill development   Coordination with care providers     Discharge Criteria:  Satisfactory progress toward treatment goals   Improvement re: identified problems and symptoms   Ability to continue recovery at next level of service   Has a discharge plan in place      Tentative Discharge Date: 18    Areas of Treatment Focus            Area of Treatment Focus:   Personal Safety  Start Date:    5/15/18    Goal:  Target Date: 18, 7/10, / Status: Active  Client will notify staff when needing  assistance to develop or implement a coping plan to manage suicidal or self injurious urges.      Progress:  6/12/2018  Maddy reported being safe during the group therapy hour.   7/10/18: Maddy denies any safety concerns. 7/30/2018  Maddy reported being safe today.             Treatment Strategies:   Teach adaptive coping skills and communication skills        Area of Treatment Focus:   Symptom Stabilization and Management  Start Date:    5/15/18    Goal:  Target Date: 6/12/18, 7/10, 7/31 Status: Active  When in self support skills group client will learn and practice 1-2 coping skills to help mange stress,decrease procrastination and make better personal decisions providing an update of progress weekly.      Progress:   6/12 Client reports progress.    7/10 Client reports that she is using her skills to manage stress 100% of the time. She is procrastinating less and is trying to make good personal decisions.    Treatment Strategies:   Facilitate increased self awareness  Provide education regarding stress management,procrastination and time management.        Area of Treatment Focus:   Symptom Stabilization and Management   Group Therapy  Start Date:    5/15/18    Goal:  Target Date: 6/12/18, 7/10, 7/31 Status: Active  Will report on symptoms and identify skills to use to manage a balanced routine, and increase skills to become more independent      Progress:  6/12/2018  Maddy reported using distraction skills to manage her anxiety by going for walks, being with her daughter and her boyfriend, cooking, and grocery shopping.     7/10/18: Maddy feels she is continuing to work on having a balanced routine by attending Day Treatment, walking, spending time with her daughter, working with [employer] for jobs She is not interested in any support groups and is encouraged to explore what structure after Day Treatment will look like for her. She would like to start working at some point.  7/30/2018 Maddy  reported that she felt people were driving haphazardly on the roads, and that people were acting strange towards her in social situations. She reported that she broke-up with her boyfriend and has a new love of her life, and that she wants to change psychiatry clinics.         Treatment Strategies:   Teach adaptive coping skills and communication skills        Area of Treatment Focus:   Wellness Goal  Start Date:    5/15/18    Goal:  Target Date: 6/12/18, 7/10, 7/31 Status: Active  Maddy will set at least one weekly goal related to developing wellness behaviors to support stability of symptoms during treatment and after discharge.      Progress:   7/10/18: Maddy has been sleeping a lot, she is encouraged not to take her Hydroxyzine during the daytime hours, as she has reported doing so and then sleeping alot during the day. She uses music and distraction to also help with her anxiety as coping skills. Maddy has also done well at setting goals around nutrition and eating healthier and smaller portion sizes, and recently started Topamax as an  Appetite suppressant to assist in this.        Treatment Strategies:   Facilitate increased self awareness  Provide education regarding Goal setting to support mental and physical well being  Use reality based supportive approach

## 2018-05-11 ENCOUNTER — OFFICE VISIT (OUTPATIENT)
Dept: FAMILY MEDICINE | Facility: CLINIC | Age: 33
End: 2018-05-11
Payer: COMMERCIAL

## 2018-05-11 ENCOUNTER — TELEPHONE (OUTPATIENT)
Dept: ALLERGY | Facility: OTHER | Age: 33
End: 2018-05-11

## 2018-05-11 VITALS
HEART RATE: 103 BPM | SYSTOLIC BLOOD PRESSURE: 124 MMHG | DIASTOLIC BLOOD PRESSURE: 76 MMHG | BODY MASS INDEX: 32.19 KG/M2 | HEIGHT: 65 IN | WEIGHT: 193.2 LBS | TEMPERATURE: 97.6 F | RESPIRATION RATE: 20 BRPM

## 2018-05-11 DIAGNOSIS — F25.0 SCHIZOAFFECTIVE DISORDER, BIPOLAR TYPE (H): ICD-10-CM

## 2018-05-11 DIAGNOSIS — F22 PARANOID TYPE DELUSIONAL DISORDER (H): ICD-10-CM

## 2018-05-11 DIAGNOSIS — F31.9 BIPOLAR 1 DISORDER (H): Primary | ICD-10-CM

## 2018-05-11 PROCEDURE — 99213 OFFICE O/P EST LOW 20 MIN: CPT | Performed by: NURSE PRACTITIONER

## 2018-05-11 NOTE — TELEPHONE ENCOUNTER
Why dont I have I see her in clinic to discuss further shots before we commit to proceeding forward with shots. Thanks.

## 2018-05-11 NOTE — PROGRESS NOTES
"Adult Mental Health Outpatient Group Therapy Progress Note     Client Initial Individualized Goals for Treatment: \"to prepare for a healthy lifestyle\"      See Initial Treatment suggestions for the client during the time between Diagnostic Assessment and completion of the Master Individualized Treatment Plan.    Treatment Goals:  Ask for more information, support and/or assistance as needed.  Follow up with providers/community supports as needed.  Report increases or changes in symptoms to staff.  Report any personal safety concerns to staff.   Take medications as prescribed.  Report medication changes and/or side effects to staff.  Attend and participate in groups as scheduled or notify staff if unable to do so.  Report any use of substances to staff as this may impact your symptoms and/or personal safety.  Notify staff if you have any other issues that need to be addressed. This may include  any current abuse / neglect / exploitation or other vulnerability.  Follow recommendations of your treatment team and discuss concerns if not in agreement.     Area of Treatment Focus:  Symptom Management    Therapeutic Interventions/Treatment Strategies:  Support, Feedback, Safety Assessments, Structured Activity and Education    Response to Treatment Strategies:  Accepted Feedback, Listened, Attentive, Accepted Support and Alert    Name of Group: Self Support Skills (3:00-3:50)    Description and Outcome:  Client presented with calm,even mood .Psychoeducation information presented included a discussion related to self esteem perception and pitfalls which can lower self esteem. Client talked about want to be more aware of the symptoms that relate to her mental health problems and wanting to be more independent. Thought process clear,goal directed and reality based.  Client would benefit from additional opportunities to practice and implement content from this session in her every day life and mental health recovery.    Is " this a Weekly Review of the Progress on the Treatment Plan?  Yes.      Are Treatment Plan Goals being addressed?  Yes, continue treatment goals      Are Treatment Plan Strategies to Address Goals Effective?  Yes, continue treatment strategies      Are there any current contracts in place?  No

## 2018-05-11 NOTE — PROGRESS NOTES
SUBJECTIVE:   Maddy Ortiz is a 32 year old female who presents to clinic today for the following health issues:    Chief Complaint   Patient presents with     Forms     Patient needing clearance to donate plasma.     Looking to donate plasma 1-2 X weekly for money.     Patient Active Problem List   Diagnosis     Animal dander allergy     CARDIOVASCULAR SCREENING; LDL GOAL LESS THAN 160     Psychosis     Paranoid type delusional disorder (H)     Bipolar 1 disorder (H)     Insomnia     Health Care Home     Depression with anxiety     Seasonal allergic rhinitis due to pollen     Allergic rhinitis due to mold     Allergic rhinitis due to animal dander     Allergic rhinitis due to dust mite     Encounter for IUD insertion     Schizoaffective disorder, bipolar type (H)     Current Outpatient Prescriptions   Medication     albuterol (PROAIR HFA/PROVENTIL HFA/VENTOLIN HFA) 108 (90 Base) MCG/ACT Inhaler     cetirizine (ZYRTEC) 10 MG tablet     clonazePAM (KLONOPIN) 0.5 MG tablet     haloperidol (HALDOL) 1 MG tablet     hydrOXYzine (ATARAX) 25 MG tablet     levonorgestrel (MIRENA) 20 MCG/24HR IUD     melatonin 3 MG tablet     ORDER FOR ALLERGEN IMMUNOTHERAPY     ORDER FOR ALLERGEN IMMUNOTHERAPY     ORDER FOR ALLERGEN IMMUNOTHERAPY     ORDER FOR ALLERGEN IMMUNOTHERAPY     permethrin 1 % LIQD     traZODone (DESYREL) 50 MG tablet     cyclobenzaprine (FLEXERIL) 10 MG tablet     EPINEPHrine (EPIPEN 2-ODETTE) 0.3 MG/0.3ML injection     fluticasone (FLONASE) 50 MCG/ACT spray     No current facility-administered medications for this visit.        Depression and Anxiety Follow-Up    Status since last visit: Improved seeing Psychology - has appointment today at 1 pm.    Recently hospitalized 4/23 for Bipolar.    Doing better per patient - only taking Haldol 7 mg at dinner.    No other maintenance medications at this time.    Other associated symptoms:None    Complicating factors:     Significant life event: No     Current substance  abuse: None  Has group therapy Monday, Tuesday and Thursday's in addition to above.    PHQ-9 5/23/2016 5/24/2017 5/7/2018   Total Score 6 3 6   Q9: Suicide Ideation Not at all Not at all Not at all     AYAKA-7 SCORE 1/19/2015 5/23/2016 5/7/2018   Total Score 2 - -   Total Score - 0 7     In the past two weeks have you had thoughts of suicide or self-harm?  No.    Do you have concerns about your personal safety or the safety of others?   No  PHQ-9  English  PHQ-9   Any Language  AYAKA-7  Suicide Assessment Five-step Evaluation and Treatment (SAFE-T)    Problem list and histories reviewed & adjusted, as indicated.  Additional history: as documented    Patient Active Problem List   Diagnosis     Animal dander allergy     CARDIOVASCULAR SCREENING; LDL GOAL LESS THAN 160     Psychosis     Paranoid type delusional disorder (H)     Bipolar 1 disorder (H)     Insomnia     Health Care Home     Depression with anxiety     Seasonal allergic rhinitis due to pollen     Allergic rhinitis due to mold     Allergic rhinitis due to animal dander     Allergic rhinitis due to dust mite     Encounter for IUD insertion     Schizoaffective disorder, bipolar type (H)     Past Surgical History:   Procedure Laterality Date     TONSILLECTOMY & ADENOIDECTOMY      as a child       Social History   Substance Use Topics     Smoking status: Never Smoker     Smokeless tobacco: Never Used     Alcohol use Yes      Comment: rare wine ( very rare)     Family History   Problem Relation Age of Onset     Adopted: Yes     Unknown/Adopted Mother      Unknown/Adopted Father      Unknown/Adopted Other          Current Outpatient Prescriptions   Medication Sig Dispense Refill     albuterol (PROAIR HFA/PROVENTIL HFA/VENTOLIN HFA) 108 (90 Base) MCG/ACT Inhaler Inhale 2 puffs into the lungs every 4 hours as needed for wheezing or shortness of breath / dyspnea 1 Inhaler 0     cetirizine (ZYRTEC) 10 MG tablet Take 1 tablet (10 mg) by mouth daily as needed for allergies  30 tablet 0     clonazePAM (KLONOPIN) 0.5 MG tablet Take 1 tablet (0.5 mg) by mouth 2 times daily as needed for anxiety 60 tablet 0     haloperidol (HALDOL) 1 MG tablet Take 7 tablets (7 mg) by mouth daily (with dinner) 210 tablet 0     hydrOXYzine (ATARAX) 25 MG tablet Take 1 tablet (25 mg) by mouth every 4 hours as needed for anxiety 120 tablet 0     levonorgestrel (MIRENA) 20 MCG/24HR IUD 1 each (20 mcg) by Intrauterine route continuous       melatonin 3 MG tablet Take 1 tablet (3 mg) by mouth nightly as needed for sleep 30 tablet 0     ORDER FOR ALLERGEN IMMUNOTHERAPY Cat Hair, Standardized 10,000 BAU/mL, ALK  3.0 ml  Dog Hair Dander, A. P.  1:100 w/v, HS  1.0 ml  Dust Mites F 30,000AU/mL, HS  0.5 ml  Dust Mites P. 30,000 AU/mL, HS  0.5 ml   Diluent: HSA qs to 5ml 5 mL PRN     ORDER FOR ALLERGEN IMMUNOTHERAPY Alternaria Tenuis 1:10 w/v, HS  0.5 ml  Epicoccum Nigrum 1:10 w/v, HS 0.5 ml  Hormodendrum Cladosporioides 1:10 w/v, HS 0.5 ml  Diluent: HSA qs to 5ml 5 mL PRN     ORDER FOR ALLERGEN IMMUNOTHERAPY Estuardo, White  1:20 w/v, HS  0.5 ml  Birch Mix PRW 1:20 w/v, HS  0.5 ml  Elm, American 1:20 w/v, HS  0.5 ml  Hackberry 1:20 w/v, HS 0.5 ml  Hickory, Shagbark 1:20 w/v, HS  0.5 ml  Stanford Mix RW 1:20 w/v, HS 0.5 ml  Oak Mix RVW 1:20 w/v, HS 0.5 ml  Delmar Tree, Black 1:20 w/v, HS 0.5 ml  Tecumseh, Black 1:20 w/v, HS 0.5 ml  Diluent: HSA qs to 5ml 5 mL PRN     ORDER FOR ALLERGEN IMMUNOTHERAPY Kochia 1:20 w/v, HS 1.0 ml  Nettle 1:20 w/v, HS 1.0 ml  Plantain, English 1:20 w/v, HS 1.0 ml  Ragweed Mixed 1:20 w/v ALK  0.6 ml  Russian Thistle 1:20 w/v, HS 1.0 ml  Diluent: HSA qs to 5ml 5 mL PRN     permethrin 1 % LIQD Apply to clean, towel-dried hair, saturate hair and scalp, wash off after 10 min. 60 mL 1     traZODone (DESYREL) 50 MG tablet Take 1 tablet (50 mg) by mouth every evening as needed for sleep 30 tablet 0     cyclobenzaprine (FLEXERIL) 10 MG tablet Take 0.5-1 tablets (5-10 mg) by mouth At Bedtime (Patient not  "taking: Reported on 8/1/2017) 30 tablet 0     EPINEPHrine (EPIPEN 2-ODETTE) 0.3 MG/0.3ML injection Inject 0.3 mLs (0.3 mg) into the muscle once as needed for anaphylaxis (Patient not taking: Reported on 4/12/2018) 0.6 mL 3     fluticasone (FLONASE) 50 MCG/ACT spray Spray 1-2 sprays into both nostrils daily as needed for rhinitis (Patient not taking: Reported on 5/11/2018) 1 Bottle 0     Allergies   Allergen Reactions     Animal Dander      Nkda [No Known Drug Allergies]      Seasonal Allergies      Weeds and mold     Recent Labs   Lab Test  04/23/18   1355  11/21/17   1043  05/31/17   0200  01/11/16   2115   06/09/13   0804  06/07/13   1645   A1C   --    --    --    --    --   4.7   --    ALT  36   --   41  29   < >   --    --    CR  0.76   --   0.84  0.64   < >   --   0.66   GFRESTIMATED  88   --   78  >90  Non  GFR Calc     < >   --   >90   GFRESTBLACK  >90   --   >90   GFR Calc    >90   GFR Calc     < >   --   >90   POTASSIUM  3.7  3.9  3.4  3.4   < >  3.9  3.3*   TSH  2.33   --    --    --    --    --   1.35    < > = values in this interval not displayed.      BP Readings from Last 3 Encounters:   05/11/18 124/76   05/04/18 124/82   05/03/18 108/71    Wt Readings from Last 3 Encounters:   05/11/18 193 lb 3.2 oz (87.6 kg)   05/04/18 193 lb (87.5 kg)   05/03/18 192 lb (87.1 kg)                  Labs reviewed in EPIC    Reviewed and updated as needed this visit by clinical staff  Tobacco  Allergies  Med Hx  Surg Hx  Fam Hx  Soc Hx      Reviewed and updated as needed this visit by Provider         ROS:  Constitutional, HEENT, cardiovascular, pulmonary, GI, , musculoskeletal, neuro, skin, endocrine and psych systems are negative, except as otherwise noted.    OBJECTIVE:     /76 (BP Location: Right arm, Patient Position: Chair, Cuff Size: Adult Regular)  Pulse 103  Temp 97.6  F (36.4  C) (Tympanic)  Resp 20  Ht 5' 5\" (1.651 m)  Wt 193 lb 3.2 oz (87.6 kg)  " BMI 32.15 kg/m2  Body mass index is 32.15 kg/(m^2).  GENERAL: healthy, alert and no distress  PSYCH: mentation appears normal, affect flat, speech pressured and judgement and insight impaired    Diagnostic Test Results:  none     ASSESSMENT/PLAN:     1. Bipolar 1 disorder (H)  Recently hospitalization.  Following with Psych.  Group counseling    2. Paranoid type delusional disorder (H)       3. Schizoaffective disorder, bipolar type (H)     Forms completed.      See Patient Instructions    Ewelina Esparza NP  [facility]

## 2018-05-11 NOTE — TELEPHONE ENCOUNTER
Pt called stating she started behavioral health therapy on M,T, and Th. States she will need to adjust the days that she gets her cluster shots. Says Wednesdays In ER would work for her. Notified her that someone from Dr. Blevins's team will call her.    (vm not set up, she will return call if she misses it)    Guido Overton RN....5/11/2018 10:21 AM

## 2018-05-11 NOTE — MR AVS SNAPSHOT
After Visit Summary   5/11/2018    Maddy Ortiz    MRN: [id number]           Patient Information     Date Of Birth          1985        Visit Information        Provider Department      5/11/2018 10:00 AM Ewelina Esparza NP [facility]        Today's Diagnoses     Bipolar 1 disorder (H)    -  1    Paranoid type delusional disorder (H)        Schizoaffective disorder, bipolar type (H)          Care Instructions    Immunization History   Administered Date(s) Administered     Influenza (IIV3) PF 12/06/2012     Influenza Vaccine IM 3yrs+ 4 Valent IIV4 11/21/2017     TDAP Vaccine (Adacel) 12/06/2012     Patient Active Problem List   Diagnosis     Animal dander allergy     CARDIOVASCULAR SCREENING; LDL GOAL LESS THAN 160     Psychosis     Paranoid type delusional disorder (H)     Bipolar 1 disorder (H)     Insomnia     Health Care Home     Depression with anxiety     Seasonal allergic rhinitis due to pollen     Allergic rhinitis due to mold     Allergic rhinitis due to animal dander     Allergic rhinitis due to dust mite     Encounter for IUD insertion     Schizoaffective disorder, bipolar type (H)     Current Outpatient Prescriptions   Medication     albuterol (PROAIR HFA/PROVENTIL HFA/VENTOLIN HFA) 108 (90 Base) MCG/ACT Inhaler     cetirizine (ZYRTEC) 10 MG tablet     clonazePAM (KLONOPIN) 0.5 MG tablet     haloperidol (HALDOL) 1 MG tablet     hydrOXYzine (ATARAX) 25 MG tablet     levonorgestrel (MIRENA) 20 MCG/24HR IUD     melatonin 3 MG tablet     ORDER FOR ALLERGEN IMMUNOTHERAPY     ORDER FOR ALLERGEN IMMUNOTHERAPY     ORDER FOR ALLERGEN IMMUNOTHERAPY     ORDER FOR ALLERGEN IMMUNOTHERAPY     permethrin 1 % LIQD     traZODone (DESYREL) 50 MG tablet     cyclobenzaprine (FLEXERIL) 10 MG tablet     EPINEPHrine (EPIPEN 2-ODETTE) 0.3 MG/0.3ML injection     fluticasone (FLONASE) 50 MCG/ACT spray     No current facility-administered medications for this visit.      Thank you for choosing  [facility].  You may be receiving a survey in the mail from Roma Longoria regarding your visit today.  Please take a few minutes to complete and return the survey to let us know how we are doing.  Our Clinic hours are:  Mondays    7:20 am - 7 pm  Tues -  Fri  7:20 am - 5 pm  Clinic Phone: [phone]  The clinic lab opens at 7:30 am Mon - Fri and appointments are required.  [facility]. [phone]  Monday-Thursday 8 am - 7pm  Tues/Wed/Fri 8 am - 5:30 pm            Follow-ups after your visit        Your next 10 appointments already scheduled     May 14, 2018  1:00 PM CDT   Return Visit with ADULT  DAY 4C   [facility] ([facility])    [address] [place] [address]   [phone]            May 15, 2018  7:45 AM CDT   (Arrive by 7:30 AM)   New Visit with PHILL Kelley [facility] ([facility])    [address] [facility] [address]   [phone]            May 15, 2018  1:00 PM CDT   Return Visit with ADULT  DAY 4C   [facility] ([facility])    [address] [place] [address]   [phone]            May 17, 2018  1:00 PM CDT   Return Visit with ADULT  DAY 4C   [facility] ([facility])    [address] [place] [address]   [phone]            May 18, 2018 12:00 PM CDT   (Arrive by 11:45 AM)   New Patient Visit with Aggie Lehman MD   [facility] ([facility])    [address] [address] [phone]            May 21, 2018  1:00 PM CDT   Return Visit with ADULT MH DAY 4C   [facility] ([facility])    [address] [place] [address]  [facility] [address]   [phone]            May 22, 2018  1:00 PM CDT   Return Visit with ADULT MH DAY 4C   [facility] ([facility])    [address] [place] [address]   [phone]            May 24, 2018  1:00 PM CDT   Return Visit with ADULT MH DAY 4C   [facility] ([facility])    [address] [address]   [phone]            May 29, 2018  1:00 PM CDT   Return Visit with ADULT MH DAY 4C   [facility] ([facility])    [address] [address]   [phone]            May 31, 2018  1:00 PM CDT   Return Visit with ADULT MH DAY 4C   [facility] ([facility])    [address] [address]   [phone]              Who to contact     If you have questions or need follow up information about today's clinic visit or your schedule please contact [facility] directly at [phone].  Normal or non-critical lab and imaging results will be communicated to you by MyChart, letter or phone within 4 business days after the clinic has received the results. If you do not hear from us within 7 days, please contact the clinic through MyChart or phone. If you have a critical or abnormal lab result, we will notify you by phone as soon as possible.  Submit refill requests through [employer] or call your pharmacy and they will forward the refill request to us. Please allow 3 business days for your refill to be completed.          Additional Information About Your Visit        [employer] Information     [employer] lets you send messages to your doctor, view your test results, renew your prescriptions, schedule appointments and more. To sign up, go to [email or web address] . Click  "on \"Log in\" on the left side of the screen, which will take you to the Welcome page. Then click on \"Sign up Now\" on the right side of the page.     You will be asked to enter the access code listed below, as well as some personal information. Please follow the directions to create your username and password.     Your access code is: [email or web address]  Expires: 2018  3:10 PM     Your access code will  in 90 days. If you need help or a new code, please call your [place] clinic or [phone].        Care EveryWhere ID     This is your Care EveryWhere ID. This could be used by other organizations to access your [place] medical records  [id number]        Your Vitals Were     Pulse Temperature Respirations Height BMI (Body Mass Index)       103 97.6  F (36.4  C) (Tympanic) 20 5' 5\" (1.651 m) 32.15 kg/m2        Blood Pressure from Last 3 Encounters:   18 124/76   18 124/82   18 108/71    Weight from Last 3 Encounters:   18 193 lb 3.2 oz (87.6 kg)   18 193 lb (87.5 kg)   18 192 lb (87.1 kg)              Today, you had the following     No orders found for display       Primary Care Provider Office Phone # Fax #    [facility] [phone] [address] [facility] [address]        Equal Access to Services     ARMOND MCKEON AH: Hadii aad ku hadasho Soomaali, waaxda luqadaha, qaybta kaalmada adeegyada, waxhannah maciel . So [facility] [phone].    ATENCIÓN: Si habla español, tiene a titus disposición servicios gratuitos de asistencia lingüística. Llame al [phone].    We comply with applicable federal civil rights laws and [place] laws. We do not discriminate on the basis of race, color, national origin, age, disability, sex, sexual orientation, or gender identity.            Thank you!     Thank you for choosing [facility]  for your care. Our goal is always to provide you with excellent care. Hearing back " from our patients is one way we can continue to improve our services. Please take a few minutes to complete the written survey that you may receive in the mail after your visit with us. Thank you!             Your Updated Medication List - Protect others around you: Learn how to safely use, store and throw away your medicines at www.disposemymeds.org.          This list is accurate as of 5/11/18 11:32 AM.  Always use your most recent med list.                   Brand Name Dispense Instructions for use Diagnosis    albuterol 108 (90 Base) MCG/ACT Inhaler    PROAIR HFA/PROVENTIL HFA/VENTOLIN HFA    1 Inhaler    Inhale 2 puffs into the lungs every 4 hours as needed for wheezing or shortness of breath / dyspnea    Chronic seasonal allergic rhinitis due to pollen       * ALLERGEN IMMUNOTHERAPY PRESCRIPTION     5 mL    Cat Hair, Standardized 10,000 BAU/mL, ALK  3.0 ml Dog Hair Dander, A. P.  1:100 w/v, HS  1.0 ml Dust Mites F 30,000AU/mL, HS  0.5 ml Dust Mites P. 30,000 AU/mL, HS  0.5 ml  Diluent: HSA qs to 5ml    Allergic rhinitis due to animal dander, Allergic rhinitis due to dust mite       * ALLERGEN IMMUNOTHERAPY PRESCRIPTION     5 mL    Alternaria Tenuis 1:10 w/v, HS  0.5 ml Epicoccum Nigrum 1:10 w/v, HS 0.5 ml Hormodendrum Cladosporioides 1:10 w/v, HS 0.5 ml Diluent: HSA qs to 5ml    Allergic rhinitis due to mold       * ALLERGEN IMMUNOTHERAPY PRESCRIPTION     5 mL    Estuardo, White  1:20 w/v, HS  0.5 ml Birch Mix PRW 1:20 w/v, HS  0.5 ml Elm, American 1:20 w/v, HS  0.5 ml Hackberry 1:20 w/v, HS 0.5 ml Hickory, Shagbark 1:20 w/v, HS  0.5 ml Salinas Mix RW 1:20 w/v, HS 0.5 ml Oak Mix RVW 1:20 w/v, HS 0.5 ml Mildred Tree, Black 1:20 w/v, HS 0.5 ml [place], Black 1:20 w/v, HS 0.5 ml Diluent: HSA qs to 5ml    Chronic seasonal allergic rhinitis due to pollen       * ALLERGEN IMMUNOTHERAPY PRESCRIPTION     5 mL    Kochia 1:20 w/v, HS 1.0 ml Nettle 1:20 w/v, HS 1.0 ml Plantain, English 1:20 w/v, HS 1.0 ml Ragweed Mixed 1:20  w/v ALK  0.6 ml Russian Thistle 1:20 w/v, HS 1.0 ml Diluent: HSA qs to 5ml    Chronic seasonal allergic rhinitis due to pollen       cetirizine 10 MG tablet    zyrTEC    30 tablet    Take 1 tablet (10 mg) by mouth daily as needed for allergies    Chronic seasonal allergic rhinitis due to pollen       clonazePAM 0.5 MG tablet    klonoPIN    60 tablet    Take 1 tablet (0.5 mg) by mouth 2 times daily as needed for anxiety    Depression with anxiety       cyclobenzaprine 10 MG tablet    FLEXERIL    30 tablet    Take 0.5-1 tablets (5-10 mg) by mouth At Bedtime    Sacral back pain       EPINEPHrine 0.3 MG/0.3ML injection 2-pack    EPIPEN 2-ODETTE    0.6 mL    Inject 0.3 mLs (0.3 mg) into the muscle once as needed for anaphylaxis    Need for desensitization to allergens       fluticasone 50 MCG/ACT spray    FLONASE    1 Bottle    Spray 1-2 sprays into both nostrils daily as needed for rhinitis    Chronic seasonal allergic rhinitis due to pollen       haloperidol 1 MG tablet    HALDOL    210 tablet    Take 7 tablets (7 mg) by mouth daily (with dinner)    Psychosis, unspecified psychosis type       hydrOXYzine 25 MG tablet    ATARAX    120 tablet    Take 1 tablet (25 mg) by mouth every 4 hours as needed for anxiety    Depression with anxiety       levonorgestrel 20 MCG/24HR IUD    MIRENA     1 each (20 mcg) by Intrauterine route continuous    Encounter for insertion of mirena IUD       melatonin 3 MG tablet     30 tablet    Take 1 tablet (3 mg) by mouth nightly as needed for sleep    Insomnia, unspecified type       permethrin 1 % Liqd     60 mL    Apply to clean, towel-dried hair, saturate hair and scalp, wash off after 10 min.    Lice       traZODone 50 MG tablet    DESYREL    30 tablet    Take 1 tablet (50 mg) by mouth every evening as needed for sleep    Insomnia, unspecified type       * Notice:  This list has 4 medication(s) that are the same as other medications prescribed for you. Read the directions carefully, and ask  your doctor or other care provider to review them with you.

## 2018-05-11 NOTE — PATIENT INSTRUCTIONS
Immunization History   Administered Date(s) Administered     Influenza (IIV3) PF 12/06/2012     Influenza Vaccine IM 3yrs+ 4 Valent IIV4 11/21/2017     TDAP Vaccine (Adacel) 12/06/2012     Patient Active Problem List   Diagnosis     Animal dander allergy     CARDIOVASCULAR SCREENING; LDL GOAL LESS THAN 160     Psychosis     Paranoid type delusional disorder (H)     Bipolar 1 disorder (H)     Insomnia     Health Care Home     Depression with anxiety     Seasonal allergic rhinitis due to pollen     Allergic rhinitis due to mold     Allergic rhinitis due to animal dander     Allergic rhinitis due to dust mite     Encounter for IUD insertion     Schizoaffective disorder, bipolar type (H)     Current Outpatient Prescriptions   Medication     albuterol (PROAIR HFA/PROVENTIL HFA/VENTOLIN HFA) 108 (90 Base) MCG/ACT Inhaler     cetirizine (ZYRTEC) 10 MG tablet     clonazePAM (KLONOPIN) 0.5 MG tablet     haloperidol (HALDOL) 1 MG tablet     hydrOXYzine (ATARAX) 25 MG tablet     levonorgestrel (MIRENA) 20 MCG/24HR IUD     melatonin 3 MG tablet     ORDER FOR ALLERGEN IMMUNOTHERAPY     ORDER FOR ALLERGEN IMMUNOTHERAPY     ORDER FOR ALLERGEN IMMUNOTHERAPY     ORDER FOR ALLERGEN IMMUNOTHERAPY     permethrin 1 % LIQD     traZODone (DESYREL) 50 MG tablet     cyclobenzaprine (FLEXERIL) 10 MG tablet     EPINEPHrine (EPIPEN 2-ODETTE) 0.3 MG/0.3ML injection     fluticasone (FLONASE) 50 MCG/ACT spray     No current facility-administered medications for this visit.      Thank you for choosing [facility].  You may be receiving a survey in the mail from [facility] regarding your visit today.  Please take a few minutes to complete and return the survey to let us know how we are doing.  Our Clinic hours are:  Mondays    7:20 am - 7 pm  Tues -  Fri  7:20 am - 5 pm  Clinic Phone: [phone]  The clinic lab opens at 7:30 am Mon - Fri and appointments are required.  [facility]. [phone]  Monday-Thursday 8 am -  7pm  Tues/Wed/Fri 8 am - 5:30 pm

## 2018-05-11 NOTE — TELEPHONE ENCOUNTER
Forwarding to provider to advise if he would like patient to continue cluster immunotherapy or change to traditional immunotherapy.  Patient states she is receiving behavioral health therapy on M, T, Th.  Last allergy shot was 3/27/18 (yellow 0.25).  Please provide new orders for patient as well, thank you.    Nessa Sanchez RN

## 2018-05-11 NOTE — NURSING NOTE
"Chief Complaint   Patient presents with     Forms     Patient needing clearance to donate plasma.        Initial /76 (BP Location: Right arm, Patient Position: Chair, Cuff Size: Adult Regular)  Pulse 103  Temp 97.6  F (36.4  C) (Tympanic)  Resp 20  Ht 5' 5\" (1.651 m)  Wt 193 lb 3.2 oz (87.6 kg)  BMI 32.15 kg/m2 Estimated body mass index is 32.15 kg/(m^2) as calculated from the following:    Height as of this encounter: 5' 5\" (1.651 m).    Weight as of this encounter: 193 lb 3.2 oz (87.6 kg).    Medication Reconciliation: complete    Karly Vasquez MA  "

## 2018-05-11 NOTE — PROGRESS NOTES
Psychiatry staffing: case discussed  Diagnosis:  Schizoaffective, recent start.  Doing well initially, here after IP    Current Outpatient Prescriptions   Medication     albuterol (PROAIR HFA/PROVENTIL HFA/VENTOLIN HFA) 108 (90 Base) MCG/ACT Inhaler     cetirizine (ZYRTEC) 10 MG tablet     clonazePAM (KLONOPIN) 0.5 MG tablet     cyclobenzaprine (FLEXERIL) 10 MG tablet     EPINEPHrine (EPIPEN 2-ODETTE) 0.3 MG/0.3ML injection     fluticasone (FLONASE) 50 MCG/ACT spray     haloperidol (HALDOL) 1 MG tablet     hydrOXYzine (ATARAX) 25 MG tablet     levonorgestrel (MIRENA) 20 MCG/24HR IUD     melatonin 3 MG tablet     ORDER FOR ALLERGEN IMMUNOTHERAPY     ORDER FOR ALLERGEN IMMUNOTHERAPY     ORDER FOR ALLERGEN IMMUNOTHERAPY     ORDER FOR ALLERGEN IMMUNOTHERAPY     permethrin 1 % LIQD     traZODone (DESYREL) 50 MG tablet     No current facility-administered medications for this encounter.      Past Medical History:   Diagnosis Date     Bipolar 1 disorder (H) 12/6/2012     Depressive disorder      Paranoid type delusional disorder (H) 11/14/2012

## 2018-05-14 ENCOUNTER — TELEPHONE (OUTPATIENT)
Dept: FAMILY MEDICINE | Facility: CLINIC | Age: 33
End: 2018-05-14

## 2018-05-14 ENCOUNTER — HOSPITAL ENCOUNTER (OUTPATIENT)
Dept: BEHAVIORAL HEALTH | Facility: CLINIC | Age: 33
End: 2018-05-14
Attending: PSYCHIATRY & NEUROLOGY
Payer: COMMERCIAL

## 2018-05-14 PROCEDURE — H2012 BEHAV HLTH DAY TREAT, PER HR: HCPCS

## 2018-05-14 NOTE — PROGRESS NOTES
"Adult Mental Health Outpatient Group Therapy Progress Note       Name of Group:  4C Group Therapy   Time:            2:00-2:50 pm  Group Therapy      Date:              5/10//2018    Therapist:      Dariusz Spaulding, D,  L.P.          Client Initial Individualized Goals for Treatment:   \"to prepare for a healthy lifestyle\".        Diagnosis  295.70  (F25) Schizoaffective Disorder Bipolar Type      Treatment Goals:   1.  Personal Safety  Report any urges or thoughts to harm yourself to the Tx team.      2.  Self-Support Skills:  practice coping skills/strategies to help   3.  Group Therapy learn and practice 2 skills to manage the anxiety, depression and reduce negative thoughts.   4.  Community Resources: Find a support group near home, such as [facility]      Area of Treatment Focus:  Symptom Management  Personal Safety      Therapeutic Interventions/Treatment Strategies:  Support, Feedback, Safety Assessments, Structured Activity and Education      Response to Treatment Strategies:  Accepted Feedback, Listened, Attentive, Accepted Support and Alert      Description and Therapeutic Outcome:                         4C  Group Therapy   Time:     2:00-2:50 pm  Group Therapy                                 Maddy reported being safe today.  She appeared to be restless and agitated and left group twice, but did return. She stated that she went off her Haldol, due to side effects and stiffness.  She stated that she had an appointment with her Nurse Practitioner tomorrow at [facility].  She reported that she worked on a project and cleaned the garage, made ads and made a logo.  She stated that she plans to attend her appointment tomorrow and will talk to the doctor about her medications.  She stated that she couldn't stay in group to talk longer, since she had to get up and stretch before the next group.  She wore her sunglasses inside, during the entire group and it was not bright inside, or abel outside.        Client " demonstrated understanding of session content by participating in the group therapy discussion, and sharing ideas with others in the group for ways to cope.      Client will benefit from additional opportunities to practice and implement content from this session and receive feedback from the group.        The group practiced 10 minutes of mindfulness.      Is this a Weekly Review of the Progress on the Treatment Plan?  Yes.        Are Treatment Plan Goals being addressed?  Yes, continue treatment goals          Are Treatment Plan Strategies to Address Goals Effective?  Yes, continue treatment strategies          Are there any current contracts in place?  No

## 2018-05-14 NOTE — TELEPHONE ENCOUNTER
Reason for Call:  Other fyi    Detailed comments: FYI for Olga Loja:  Dr. Nely Arthur from [facility] states this patient went off her Haldol and Dr. Arthur wanted Olga to be aware as she has an appointment with patient tomorrow (5/15/18).  Dr. Arthur feels patient is not stable.    Phone Number Dr. Arthur can be reached at: Other phone number:  [phone]    Best Time: any    Can we leave a detailed message on this number? YES    Call taken on 5/14/2018 at 3:40 PM by Alejandra Hancock

## 2018-05-15 ENCOUNTER — OFFICE VISIT (OUTPATIENT)
Dept: PSYCHIATRY | Facility: CLINIC | Age: 33
End: 2018-05-15
Attending: FAMILY MEDICINE
Payer: COMMERCIAL

## 2018-05-15 VITALS
TEMPERATURE: 97.6 F | WEIGHT: 195 LBS | BODY MASS INDEX: 32.45 KG/M2 | DIASTOLIC BLOOD PRESSURE: 97 MMHG | SYSTOLIC BLOOD PRESSURE: 143 MMHG | HEART RATE: 74 BPM

## 2018-05-15 DIAGNOSIS — F25.0 SCHIZOAFFECTIVE DISORDER, BIPOLAR TYPE (H): Primary | ICD-10-CM

## 2018-05-15 DIAGNOSIS — G47.00 INSOMNIA, UNSPECIFIED TYPE: ICD-10-CM

## 2018-05-15 PROCEDURE — 90792 PSYCH DIAG EVAL W/MED SRVCS: CPT | Performed by: CLINICAL NURSE SPECIALIST

## 2018-05-15 RX ORDER — TRAZODONE HYDROCHLORIDE 50 MG/1
50 TABLET, FILM COATED ORAL
Qty: 30 TABLET | Refills: 3 | COMMUNITY
Start: 2018-05-15 | End: 2020-08-13

## 2018-05-15 RX ORDER — LANOLIN ALCOHOL/MO/W.PET/CERES
3 CREAM (GRAM) TOPICAL AT BEDTIME
Qty: 30 TABLET | Refills: 0 | Status: ON HOLD | COMMUNITY
Start: 2018-05-15 | End: 2018-08-24

## 2018-05-15 ASSESSMENT — ANXIETY QUESTIONNAIRES
4. TROUBLE RELAXING: NOT AT ALL
3. WORRYING TOO MUCH ABOUT DIFFERENT THINGS: NOT AT ALL
6. BECOMING EASILY ANNOYED OR IRRITABLE: NOT AT ALL
1. FEELING NERVOUS, ANXIOUS, OR ON EDGE: NOT AT ALL
IF YOU CHECKED OFF ANY PROBLEMS ON THIS QUESTIONNAIRE, HOW DIFFICULT HAVE THESE PROBLEMS MADE IT FOR YOU TO DO YOUR WORK, TAKE CARE OF THINGS AT HOME, OR GET ALONG WITH OTHER PEOPLE: NOT DIFFICULT AT ALL
7. FEELING AFRAID AS IF SOMETHING AWFUL MIGHT HAPPEN: NEARLY EVERY DAY
2. NOT BEING ABLE TO STOP OR CONTROL WORRYING: NOT AT ALL
5. BEING SO RESTLESS THAT IT IS HARD TO SIT STILL: NOT AT ALL
GAD7 TOTAL SCORE: 3

## 2018-05-15 NOTE — PROGRESS NOTES
"                                                         Outpatient Psychiatric Evaluation-Standard    Name: Maddy Ortiz  : 1985  Date: 5/15/2018    Source of Referral:  Primary Care Physician: Tigre Forbes [place]  Current Psychotherapist: Consuelo Zayas    Identifying Data:  Patient is a 32 year old, single female who presents for initial visit with me.  Patient is currently a student, [employer]. Patient attended the session alone.   60 minutes were spent on evaluation with 40 minutes CC time.    HPI:  Patient reports starting medications in her teens \"because I was not feeling well. I don't know\". Patient reports sometimes feeling \"dread\". Patient reports concern with death, feeling sad and grief. Patient refers to several older relatives who have .     Patient was hospitalized at  in  due to paranoia and disorganization.  Patient was prescribed Haldol 7 mg daily and stopped taking it 4 days ago due to feeling \"stiff and constipated\". Patient then states \"You don't know what love is in the brain\". Patient reports the hospitalization was due to \"environmental stressors\" and declines to elaborate. Patient denies symptoms that were outlined in the note authored by Marian Moulton MD, [facility]. Patient then states \"That was just a misunderstanding\".  Patient repeatedly states \"I'm trying to lose weight\" at inappropriate times. Patient is currently in day treatment and recently started individual therapy. Patient reports meeting with Dr. Wilson, Psychiatrist, at [facility] last week.     We started to discuss options other than Haldol to target \"dread\" as patient refers to her feelings. Patient declined to discuss options, stated the Haldol would have to be out of her system first and only Dr. Wilson would be prescribing for her. Attempted to discuss return of symptoms that led to hospitalization and medication would help avoid a return to the hospital. Patient discounted the hospitalization as \"a " "mistake\". Patient politely declined further discussion, stood up and left. Unfortunately, no one accompanied patient today to provide more information.     Psychiatric Review of Symptoms:  Depression: Sleep: \"I don't know\"  Depressed Mood Interest: Decrease Energy: Decrease    Last PHQ-9 score = 5 vs 13  Tierra:  No symptoms  Mood Disorder Questionnaire:     Anxiety: Thoughts of impending doom    GAD7 score: 3  Panic:  No symptoms  Agoraphobia:  No  OCD:  No symptoms  Psychosis: Paranoia Ideas of Reference  ADD / ADHD: No symptoms  Gambling or shoplifting: No  Eating Disorder:  No symptoms  Suicide attempts:  Yes in her teens - tried Tylenol overdose  Current SI risk:  No          Patient reports no suicidal feelings today. In addition, he has notable risk factors for self-harm, including previous SA.  However, risk is mitigated by commitment to family \"People just shouldn't hurt themselves\" Therefore, based on all available evidence including the factors cited above, he does not appear to be at imminent risk for self-harm, does not meet criteria for a 72-hr hold, and therefore remains appropriate for ongoing outpatient level of care. Currently has a therapist.     Significant Losses / Trauma / Abuse / Neglect Issues:  There are indications or report of significant loss, trauma, abuse or neglect issues related to: death of \"older relatives\".    PTSD:  No symptoms    Issues of possible neglect are not present.    A safety and risk management plan has not been developed at this time, however client was given the after-hours number / 911 should there be a change in any of these risk factors.      Psychiatric History:   Hospitalizations: [facility] 2018  Past psychotherapy: counseling, day treatment and medication(s) from physician / PCP    Past medication trials: (patient was presented with a list to review all currently available antidepressants, mood stabilizers, tranquilizers, hypnotics and " "antipsychotics)  New Antidepressants:  Celexa (citalopram), Prozac (fluoxetine) and Wellbutrin, Zyban, Aplenzin (bupropion)  Mood Stabilizers:  Depakote and Depakot ER (valproate/valproic acid) and Lamictal (lamotrigine)  Older Antipsychotics:  Haldol (haloperidol)  Newer Antipsychotics: aripiprazole (Abilify), Geodon (ziprasidone), Latuda (lurasidone), Risperdal (risperidone) and Zyprexa (olanzapine)  Sedatives/Hypnotics:  Ambien (zolpidem)  Tranquilizers:  Ativan (lorazepam)      Chemical Use History:  Patient has not received chemical dependency treatment in the past.  Patient reports no problems as a result of their drinking / drug use.  Current use of drugs or alcohol: N/A  CAGE: None of the patient's responses to the CAGE screening were positive / Negative CAGE score   Based on the negative Cage-Aid score and clinical interview there  are not indications of drug or alcohol abuse.  Tobacco use: No  Ready to quit?  No  NRT tried: NA    Past Medical History:  Surgery:   Past Surgical History:   Procedure Laterality Date     TONSILLECTOMY & ADENOIDECTOMY      as a child     Allergies:    Allergies   Allergen Reactions     Animal Dander      Nkda [No Known Drug Allergies]      Seasonal Allergies      Weeds and mold     Primary MD: [facility]  Seizures or head injury: No  Diet: \"Normal\"  Exercise: sporadic or irregular exercise  Supplements: none    Current Medications:  Current Outpatient Prescriptions   Medication Sig     cetirizine (ZYRTEC) 10 MG tablet Take 1 tablet (10 mg) by mouth daily as needed for allergies     EPINEPHrine (EPIPEN 2-ODETTE) 0.3 MG/0.3ML injection Inject 0.3 mLs (0.3 mg) into the muscle once as needed for anaphylaxis     levonorgestrel (MIRENA) 20 MCG/24HR IUD 1 each (20 mcg) by Intrauterine route continuous     melatonin 3 MG tablet Take 1 tablet (3 mg) by mouth nightly as needed for sleep     traZODone (DESYREL) 50 MG tablet Take 1 tablet (50 mg) by mouth every evening as needed " for sleep     haloperidol (HALDOL) 1 MG tablet Take 7 tablets (7 mg) by mouth daily (with dinner) (Patient not taking: Reported on 5/15/2018)     ORDER FOR ALLERGEN IMMUNOTHERAPY Cat Hair, Standardized 10,000 BAU/mL, ALK  3.0 ml  Dog Hair Dander, A. P.  1:100 w/v, HS  1.0 ml  Dust Mites F 30,000AU/mL, HS  0.5 ml  Dust Mites P. 30,000 AU/mL, HS  0.5 ml   Diluent: HSA qs to 5ml     ORDER FOR ALLERGEN IMMUNOTHERAPY Alternaria Tenuis 1:10 w/v, HS  0.5 ml  Epicoccum Nigrum 1:10 w/v, HS 0.5 ml  Hormodendrum Cladosporioides 1:10 w/v, HS 0.5 ml  Diluent: HSA qs to 5ml     ORDER FOR ALLERGEN IMMUNOTHERAPY Estuardo, White  1:20 w/v, HS  0.5 ml  Birch Mix PRW 1:20 w/v, HS  0.5 ml  Elm, American 1:20 w/v, HS  0.5 ml  Hackberry 1:20 w/v, HS 0.5 ml  Hickory, Shagbark 1:20 w/v, HS  0.5 ml  [place] Mix RW 1:20 w/v, HS 0.5 ml  Oak Mix RVW 1:20 w/v, HS 0.5 ml  [place] Tree, Black 1:20 w/v, HS 0.5 ml  Milo, Black 1:20 w/v, HS 0.5 ml  Diluent: HSA qs to 5ml     ORDER FOR ALLERGEN IMMUNOTHERAPY Kochia 1:20 w/v, HS 1.0 ml  Nettle 1:20 w/v, HS 1.0 ml  Plantain, English 1:20 w/v, HS 1.0 ml  Ragweed Mixed 1:20 w/v ALK  0.6 ml  Russian Thistle 1:20 w/v, HS 1.0 ml  Diluent: HSA qs to 5ml     No current facility-administered medications for this visit.        Vital Signs:  BP (!) 143/97 (BP Location: Left arm, Patient Position: Sitting, Cuff Size: Adult Regular)  Pulse 74  Temp 97.6  F (36.4  C) (Tympanic)  Wt 195 lb (88.5 kg)  BMI 32.45 kg/m2      Review of Systems:  (constitutional, HEENT, Neuro, Cardiac, Pulmonary, GI, , Heme / Lymph, Endocrine, Skin / Breast, MSK reviewed)  10 point ROS was negative except for the following: those listed above.    Family History:   (with focus on psychiatric and substance abuse)  Chemical use problems None  Mental health history: None  Patient reports family history includes Unknown/Adopted in her father, mother, and another family member. She was adopted.    Social History:   Patient grew up in  "[place], [place]    Siblings: 4  Intact family growing up?; Yes  Highest education level was some college.   Marital status and living situation: Lives with daughter  one children. Daughter age 9  she has not been involved with the legal system.      Mental Status Assessment:     Appearance:  Well groomed      Behavior/relationship to examiner/demeanor:  Cooperative, engaged and pleasant  Motor activity:  Normal  Gait:  Normal   Speech:  Normal in volume, articulation, coherence   Mood (subjective report):  \"Good\"  Affect (objective appearance):  Mood congruent  Thought Process (Associations):  Logical, linear and goal directed  Thought content:  No evidence of suicidal or homicidal ideation,          no overt psychosis and                    patient does not appear to be responding to internal stimuli  Oriented to person, place, date/time   Attention Span and concentration: Intact   Memory:  Short-term memory intact and Long-term memory; Intact  Language:  Fluent   Fund of Knowledge/Intelligence:  Average  Use of language: Intact   Abstraction:  Normal  Insight:  Adequate  Judgment:  Adequate for safety    DSM5  Diagnosis:    295.70  (F25) Schizoaffective Disorder Bipolar Type  Psychosocial & Contextual Factors: unemployment, education issues    Strengths and Liabilities:   Patient identified the following strengths or resources that will help her  succeed in counseling: cesar / spirituality, friends / good social support and family support.  Things that may interfere with the patient's success include:few friends and financial hardship.    WHODAS 2.0 TOTAL SCORES 5/15/2018   Total Score 16         Impression:  Patient discontinued Haldol several days ago due to stiffness and constipation. Patient appears somewhat disorganized; however, is able to refocus with slight relapses into disorganization as outlined above. While patient would benefit from a antipsychotic at this time, she flatly refuses to consider starting " medication. Patient is not a harm to herself or others, is not psychotic, so there is no basis for hospitalization or a hold.     Patient also states she has a psychiatrist at [facility] whom she meets with regularly.     Medication side effects and alternatives reviewed.     Treatment Plan:  Change to trazodone (Desyrel) 50 mg at bedtime and melatonin 3 mg at bedtime.     Follow up with psychiatrist at [facility]. Patient was informed of my leaving this position which should not interfere with care as she has a psychiatrist.     - Recommend patient discuss medications with their pharmacist. Risks and benefits of medications discussed, including side effect profile.   - Safety plan was reviewed; to the ER as needed or call after hours crisis line; [phone]  - Education and counseling was done regarding use of medications, psychotherapy options  - Call [phone] for appointment or to speak to a nurse.   -Office hours: Monday through Thursday 8:00 am to 4:30 pm.   - Patient was given a copy of this Treatment Plan today.     My Practice Policy was reviewed and signed: YES       The patient is being referred to long term community psychiatry care and provider will provide bridging until patient is established with new community provider.       Signed: Olga Loja, RN, MS, APRN                 Psychiatry

## 2018-05-15 NOTE — TELEPHONE ENCOUNTER
Phone call from Maddy to say that she had a psychiatry appointment and   Wouldn't be at the program today.    Nely Arthur, Psy., D,  L.P.  [facility]ult [facility]

## 2018-05-15 NOTE — MR AVS SNAPSHOT
After Visit Summary   5/15/2018    Maddy Ortiz    MRN: [id number]           Patient Information     Date Of Birth          1985        Visit Information        Provider Department      5/15/2018 7:45 AM Olga Loja APRN [facility]        Today's Diagnoses     Insomnia, unspecified type          Care Instructions    Treatment Plan:  Change to trazodone (Desyrel) 50 mg at bedtime and melatonin 3 mg at bedtime.     Follow up with psychiatrist at [facility].     - Recommend patient discuss medications with their pharmacist. Risks and benefits of medications discussed, including side effect profile.   - Safety plan was reviewed; to the ER as needed or call after hours crisis line; [phone]  - Education and counseling was done regarding use of medications, psychotherapy options  - Call [phone] for appointment or to speak to a nurse.   -Office hours: Monday through Thursday 8:00 am to 4:30 pm.             Follow-ups after your visit        Your next 10 appointments already scheduled     May 15, 2018  1:00 PM CDT   Return Visit with ADULT MH DAY 4C [facility] ([facility])    [address] [place] [address]   [phone]            May 16, 2018  1:20 PM CDT   Return Visit with Magdy Blevins DO   [facility]    [address]  [facility] [address]   [phone]            May 17, 2018  1:00 PM CDT   Return Visit with ADULT  DAY 4C [facility] ([facility])    [address] [place] [address]   [phone]            May 18, 2018 12:00 PM CDT   (Arrive by 11:45 AM)   New Patient Visit with Aggie Lehman MD   [facility] ([facility])    [address]  [address]   [phone]            May 21, 2018  1:00 PM CDT   Return Visit with ADULT MH DAY 4C   [facility] ([facility])    [address] [address]   [phone]            May 22, 2018  1:00 PM CDT   Return Visit with ADULT MH DAY 4C   [facility] ([facility])    [address] [address]   [phone]            May 24, 2018  1:00 PM CDT   Return Visit with ADULT MH DAY 4C   [facility] ([facility])    [facility][note 2][address] [address]   [phone]            May 29, 2018  1:00 PM CDT   Return Visit with ADULT  DAY 4C   [facility] ([facility])    [facility][note 2][address] [address]   [phone]            May 31, 2018  1:00 PM CDT   Return Visit with ADULT MH DAY 4C   [facility] ([facility])    [facility][note 2][address] [address]   [phone]            Jun 04, 2018  1:00 PM CDT   Return Visit with ADULT  DAY 4C   [facility] ([facility])    [facility][note 2][address] [address]   [phone]              Who to contact     If you have questions or need follow up information about today's clinic visit or your schedule please contact [facility] directly at [phone].  Normal or non-critical lab and imaging results will be communicated to you by MyChart, letter or phone within 4 business days after the clinic has received the results. If you do not hear from us within 7 days, please contact the clinic through MyChart or phone. If you have a critical or abnormal lab result, we will notify you by phone as soon as  "possible.  Submit refill requests through Eko or call your pharmacy and they will forward the refill request to us. Please allow 3 business days for your refill to be completed.          Additional Information About Your Visit        Eko Information     Eko lets you send messages to your doctor, view your test results, renew your prescriptions, schedule appointments and more. To sign up, go to [email or web address] . Click on \"Log in\" on the left side of the screen, which will take you to the Welcome page. Then click on \"Sign up Now\" on the right side of the page.     You will be asked to enter the access code listed below, as well as some personal information. Please follow the directions to create your username and password.     Your access code is: [email or web address]  Expires: 2018  3:10 PM     Your access code will  in 90 days. If you need help or a new code, please call your [place] clinic or [phone].        Care EveryWhere ID     This is your Care EveryWhere ID. This could be used by other organizations to access your [place] medical records  [id number]        Your Vitals Were     Pulse Temperature BMI (Body Mass Index)             74 97.6  F (36.4  C) (Tympanic) 32.45 kg/m2          Blood Pressure from Last 3 Encounters:   05/15/18 (!) 143/97   18 124/76   18 124/82    Weight from Last 3 Encounters:   05/15/18 195 lb (88.5 kg)   18 193 lb 3.2 oz (87.6 kg)   18 193 lb (87.5 kg)              Today, you had the following     No orders found for display         Today's Medication Changes          These changes are accurate as of 5/15/18  8:23 AM.  If you have any questions, ask your nurse or doctor.               These medicines have changed or have updated prescriptions.        Dose/Directions    melatonin 3 MG tablet   This may have changed:    - when to take this  - reasons to take this   Used for:  Insomnia, unspecified type   Changed by:  Olga Loja " PHILL [place] CNS        Dose:  3 mg   Take 1 tablet (3 mg) by mouth At Bedtime   Quantity:  30 tablet   Refills:  0       traZODone 50 MG tablet   Commonly known as:  DESYREL   This may have changed:    - when to take this  - reasons to take this   Used for:  Insomnia, unspecified type   Changed by:  Olga Loja APRN CNS        Dose:  50 mg   Take 1 tablet (50 mg) by mouth At Bedtime   Quantity:  30 tablet   Refills:  3         Stop taking these medicines if you haven't already. Please contact your care team if you have questions.     albuterol 108 (90 Base) MCG/ACT Inhaler   Commonly known as:  PROAIR HFA/PROVENTIL HFA/VENTOLIN HFA   Stopped by:  Olga Loja APRN CNS           clonazePAM 0.5 MG tablet   Commonly known as:  klonoPIN   Stopped by:  Olga Loja APRN CNS           cyclobenzaprine 10 MG tablet   Commonly known as:  FLEXERIL   Stopped by:  Olga Loja APRN CNS           fluticasone 50 MCG/ACT spray   Commonly known as:  FLONASE   Stopped by:  Olga Loja APRN CNS           hydrOXYzine 25 MG tablet   Commonly known as:  ATARAX   Stopped by:  Olga Loja APRN CNS           permethrin 1 % Liqd   Stopped by:  Olga Loja APRN CNS                    Primary Care Provider Office Phone # Fax #    [facility] [phone] [address] [address]        Equal Access to Services     [facility] AH: Hadii aad ku hadasho Soomaali, waaxda luqadaha, qaybta kaalmada adeegyada, waxay paulino maciel . So [facility] [place] [phone].    ATENCIÓN: Si colleenla español, tiene a titus disposición servicios gratuitos de asistencia lingüística. Llame al [phone].    We comply with applicable federal civil rights laws and [place] laws. We do not discriminate on the basis of race, color, national origin, age, disability, sex, sexual orientation, or gender identity.             Thank you!     Thank you for choosing [facility]  for your care. Our goal is always to provide you with excellent care. Hearing back from our patients is one way we can continue to improve our services. Please take a few minutes to complete the written survey that you may receive in the mail after your visit with us. Thank you!             Your Updated Medication List - Protect others around you: Learn how to safely use, store and throw away your medicines at www.disposemymeds.org.          This list is accurate as of 5/15/18  8:23 AM.  Always use your most recent med list.                   Brand Name Dispense Instructions for use Diagnosis    * ALLERGEN IMMUNOTHERAPY PRESCRIPTION     5 mL    Cat Hair, Standardized 10,000 BAU/mL, ALK  3.0 ml Dog Hair Dander, A. P.  1:100 w/v, HS  1.0 ml Dust Mites F 30,000AU/mL, HS  0.5 ml Dust Mites P. 30,000 AU/mL, HS  0.5 ml  Diluent: HSA qs to 5ml    Allergic rhinitis due to animal dander, Allergic rhinitis due to dust mite       * ALLERGEN IMMUNOTHERAPY PRESCRIPTION     5 mL    Alternaria Tenuis 1:10 w/v, HS  0.5 ml Epicoccum Nigrum 1:10 w/v, HS 0.5 ml Hormodendrum Cladosporioides 1:10 w/v, HS 0.5 ml Diluent: HSA qs to 5ml    Allergic rhinitis due to mold       * ALLERGEN IMMUNOTHERAPY PRESCRIPTION     5 mL    Estuardo, White  1:20 w/v, HS  0.5 ml Birch Mix PRW 1:20 w/v, HS  0.5 ml Elm, American 1:20 w/v, HS  0.5 ml Hackberry 1:20 w/v, HS 0.5 ml Hickory, Shagbark 1:20 w/v, HS  0.5 ml [place] Mix RW 1:20 w/v, HS 0.5 ml Oak Mix RVW 1:20 w/v, HS 0.5 ml [place] Tree, Black 1:20 w/v, HS 0.5 ml [place], Black 1:20 w/v, HS 0.5 ml Diluent: HSA qs to 5ml    Chronic seasonal allergic rhinitis due to pollen       * ALLERGEN IMMUNOTHERAPY PRESCRIPTION     5 mL    Kochia 1:20 w/v, HS 1.0 ml Nettle 1:20 w/v, HS 1.0 ml Plantain, English 1:20 w/v, HS 1.0 ml Ragweed Mixed 1:20 w/v ALK  0.6 ml Russian Thistle 1:20 w/v, HS 1.0 ml Diluent: HSA qs to 5ml    Chronic seasonal allergic  rhinitis due to pollen       cetirizine 10 MG tablet    zyrTEC    30 tablet    Take 1 tablet (10 mg) by mouth daily as needed for allergies    Chronic seasonal allergic rhinitis due to pollen       EPINEPHrine 0.3 MG/0.3ML injection 2-pack    EPIPEN 2-ODETTE    0.6 mL    Inject 0.3 mLs (0.3 mg) into the muscle once as needed for anaphylaxis    Need for desensitization to allergens       levonorgestrel 20 MCG/24HR IUD    MIRENA     1 each (20 mcg) by Intrauterine route continuous    Encounter for insertion of mirena IUD       melatonin 3 MG tablet     30 tablet    Take 1 tablet (3 mg) by mouth At Bedtime    Insomnia, unspecified type       traZODone 50 MG tablet    DESYREL    30 tablet    Take 1 tablet (50 mg) by mouth At Bedtime    Insomnia, unspecified type       * Notice:  This list has 4 medication(s) that are the same as other medications prescribed for you. Read the directions carefully, and ask your doctor or other care provider to review them with you.

## 2018-05-15 NOTE — PATIENT INSTRUCTIONS
Treatment Plan:  Change to trazodone (Desyrel) 50 mg at bedtime and melatonin 3 mg at bedtime.     Follow up with psychiatrist at [facility].     - Recommend patient discuss medications with their pharmacist. Risks and benefits of medications discussed, including side effect profile.   - Safety plan was reviewed; to the ER as needed or call after hours crisis line; [phone]  - Education and counseling was done regarding use of medications, psychotherapy options  - Call [phone] for appointment or to speak to a nurse.   -Office hours: Monday through Thursday 8:00 am to 4:30 pm.

## 2018-05-15 NOTE — PROGRESS NOTES
"Adult Mental Health Outpatient Group Therapy Progress Note     Client Initial Individualized Goals for Treatment: \"to prepare for a healthy lifestyle\"      See Initial Treatment suggestions for the client during the time between Diagnostic Assessment and completion of the Master Individualized Treatment Plan.    Treatment Goals:  Ask for more information, support and/or assistance as needed.  Follow up with providers/community supports as needed.  Report increases or changes in symptoms to staff.  Report any personal safety concerns to staff.   Take medications as prescribed.  Report medication changes and/or side effects to staff.  Attend and participate in groups as scheduled or notify staff if unable to do so.  Report any use of substances to staff as this may impact your symptoms and/or personal safety.  Notify staff if you have any other issues that need to be addressed. This may include  any current abuse / neglect / exploitation or other vulnerability.  Follow recommendations of your treatment team and discuss concerns if not in agreement.     Area of Treatment Focus:  Symptom Management    Therapeutic Interventions/Treatment Strategies:  Support, Feedback, Safety Assessments, Structured Activity and Education    Response to Treatment Strategies:  Accepted Feedback, Listened, Attentive, Accepted Support and Alert    Name of Group: Self Support Skills (3:00-3:50)    Description and Outcome:  Client presented with calm,even mood with fair concentration and focus.Psychoeducation information presented included a discussion related to stress management with a focus on healthy lifestyle. Thought process clear,goal directed and reality based.  Client would benefit from additional opportunities to practice and implement content from this session in her every day life and mental health recovery.    Is this a Weekly Review of the Progress on the Treatment Plan?  Yes.      Are Treatment Plan Goals being addressed?  Yes, " continue treatment goals      Are Treatment Plan Strategies to Address Goals Effective?  Yes, continue treatment strategies      Are there any current contracts in place?  No

## 2018-05-16 ENCOUNTER — OFFICE VISIT (OUTPATIENT)
Dept: ALLERGY | Facility: OTHER | Age: 33
End: 2018-05-16
Payer: COMMERCIAL

## 2018-05-16 VITALS
SYSTOLIC BLOOD PRESSURE: 118 MMHG | HEART RATE: 104 BPM | OXYGEN SATURATION: 96 % | TEMPERATURE: 97.7 F | DIASTOLIC BLOOD PRESSURE: 76 MMHG | BODY MASS INDEX: 32.28 KG/M2 | WEIGHT: 194 LBS

## 2018-05-16 DIAGNOSIS — J30.89 ALLERGIC RHINITIS DUE TO MOLD: ICD-10-CM

## 2018-05-16 DIAGNOSIS — J30.89 ALLERGIC RHINITIS DUE TO DUST MITE: ICD-10-CM

## 2018-05-16 DIAGNOSIS — J30.1 CHRONIC SEASONAL ALLERGIC RHINITIS DUE TO POLLEN: Primary | ICD-10-CM

## 2018-05-16 DIAGNOSIS — J30.81 ALLERGIC RHINITIS DUE TO ANIMAL DANDER: ICD-10-CM

## 2018-05-16 PROCEDURE — 99213 OFFICE O/P EST LOW 20 MIN: CPT | Performed by: ALLERGY & IMMUNOLOGY

## 2018-05-16 ASSESSMENT — PATIENT HEALTH QUESTIONNAIRE - PHQ9: SUM OF ALL RESPONSES TO PHQ QUESTIONS 1-9: 15

## 2018-05-16 ASSESSMENT — ANXIETY QUESTIONNAIRES: GAD7 TOTAL SCORE: 3

## 2018-05-16 NOTE — MR AVS SNAPSHOT
After Visit Summary   5/16/2018    Maddy Ortiz    MRN: [id number]           Patient Information     Date Of Birth          1985        Visit Information        Provider Department      5/16/2018 1:20 PM Magdy Blevins DO [facility]        Today's Diagnoses     Chronic seasonal allergic rhinitis due to pollen    -  1    Allergic rhinitis due to mold        Allergic rhinitis due to dust mite        Allergic rhinitis due to animal dander          Care Instructions    Allergy Staff Appt Hours Shot Hours Locations    Physician     Magdy Blevins DO       Support Staff     Nessa SIERRA RN      Neeru SIERRA, [facility]  Monday:                      [place] 8-7     Tuesday:         [place] 8-5     Wednesday:        [place]: 7-5     Friday:        [place] 7-5   [place]        Monday: 9-5:50        Wednesday: 2-5:50        Friday: 7-12:50     [place]        Tuesday: 7-10:50        Thursday: 1:30-6:30        Friday: 8:00-3:50     [place]        Monday: 7:10-4:50        Tuesday: 12:30-6:30        Thursday: 7-11:50 [facility]  [address] [place] [address]  Appt Line: [phone]  Allergy RN (Monday):  [phone]    [facility]  [address] [place] [address]  Appt Line: [phone]  Allergy RN (Tues & Wed):  [phone]    [facility]  [address] [place] [address]  Appt Line: [phone]  Allergy RN (Friday):  [phone]       Important Scheduling Information  Aspirin Desensitization: Appt will last 2 clinic days. Please call the Allergy RN line for your clinic to schedule. Discontinue antihistamines 7 days prior to the appointment.     Food Challenges: Appt will last 3-4 hours. Please call the Allergy RN line for your clinic to schedule. Discontinue antihistamines 7 days prior to the appointment.     Penicillin Testing: Appt will last 2-3 hours. Please call the Allergy RN line for your clinic to schedule. Discontinue antihistamines 7  days prior to the appointment.     Skin Testing: Appt will about 40 minutes. Call the appointment line for your clinic to schedule. Discontinue antihistamines 7 days prior to the appointment.     Venom Testing: Appt will last 2-3 hours. Please call the Allergy RN line for your clinic to schedule. Discontinue antihistamines 7 days prior to the appointment.     Thank you for trusting us with your Allergy, Asthma, and Immunology care. Please feel free to contact us with any questions or concerns you may have.                Follow-ups after your visit        Your next 10 appointments already scheduled     May 17, 2018  1:00 PM CDT   Return Visit with ADULT  DAY 4C   [facility] ([facility])    [address] [address]   [phone]            May 18, 2018 12:00 PM CDT   (Arrive by 11:45 AM)   New Patient Visit with Aggie Lehman MD   [facility] ([facility])    [address]  4th [address]  [facility] [address]   [phone]            May 21, 2018  1:00 PM CDT   Return Visit with ADULT  DAY 4C [facility] ([facility])    [address] [address]   [phone]            May 22, 2018  1:00 PM CDT   Return Visit with ADULT MH DAY 4C [facility] ([facility])    [address] [address]   [phone]            May 23, 2018  2:00 PM CDT   Return Visit with Magdy Blevins DO   [facility]    [address]  [facility] [address]   [phone]            May 24, 2018  1:00 PM CDT   Return Visit with ADULT MH DAY 4C [facility] ([facility])    [address] [place] [address]  "[facility] [address]   [phone]            May 29, 2018  1:00 PM CDT   Return Visit with ADULT MH DAY 4C   [facility] ([facility])    [address] [address]   [phone]            May 30, 2018  1:00 PM CDT   [facility]ins [facility]    [address]  [facility] [address]   [phone]            May 31, 2018  1:00 PM CDT   Return Visit with ADULT MH DAY 4C   [facility] ([facility])    [facility] [address] [address]   [phone]            Jun 04, 2018  1:00 PM CDT   Return Visit with ADULT MH DAY 4C   [facility] ([facility])    [facility] [address] [address]   [phone]              Who to contact     If you have questions or need follow up information about today's clinic visit or your schedule please contact [facility] directly at [phone].  Normal or non-critical lab and imaging results will be communicated to you by MyChart, letter or phone within 4 business days after the clinic has received the results. If you do not hear from us within 7 days, please contact the clinic through Kiromichart or phone. If you have a critical or abnormal lab result, we will notify you by phone as soon as possible.  Submit refill requests through Site Tour or call your pharmacy and they will forward the refill request to us. Please allow 3 business days for your refill to be completed.          Additional Information About Your Visit        Site Tour Information     Site Tour lets you send messages to your doctor, view your test results, renew your prescriptions, schedule appointments and more. To sign up, go to [email or web address] . Click on \"Log in\" on the " "left side of the screen, which will take you to the Welcome page. Then click on \"Sign up Now\" on the right side of the page.     You will be asked to enter the access code listed below, as well as some personal information. Please follow the directions to create your username and password.     Your access code is: [email or web address]  Expires: 2018  3:10 PM     Your access code will  in 90 days. If you need help or a new code, please call your [facility] clinic or [phone].        Care EveryWhere ID     This is your Care EveryWhere ID. This could be used by other organizations to access your [facility] medical records  [id number]        Your Vitals Were     Pulse Temperature Pulse Oximetry BMI (Body Mass Index)          104 97.7  F (36.5  C) (Oral) 96% 32.28 kg/m2         Blood Pressure from Last 3 Encounters:   18 118/76   05/15/18 (!) 143/97   18 124/76    Weight from Last 3 Encounters:   18 88 kg (194 lb)   05/15/18 88.5 kg (195 lb)   18 87.6 kg (193 lb 3.2 oz)              Today, you had the following     No orders found for display       Primary Care Provider Office Phone # Fax #    [facility] [phone] [address] [address]        Equal Access to Services     ARMOND MCKEON : Hadii linda robbinso Soradha, waaxda luqadaha, qaybta kaalmada adecarmelitayatruong, lincoln fernandez. So [facility] [phone].    ATENCIÓN: Si habla español, tiene a titus disposición servicios gratuitos de asistencia lingüística. Llame al [phone].    We comply with applicable federal civil rights laws and [place] laws. We do not discriminate on the basis of race, color, national origin, age, disability, sex, sexual orientation, or gender identity.            Thank you!     Thank you for choosing [facility]  for your care. Our goal is always to provide you with excellent care. Hearing back from our patients is one way we can " continue to improve our services. Please take a few minutes to complete the written survey that you may receive in the mail after your visit with us. Thank you!             Your Updated Medication List - Protect others around you: Learn how to safely use, store and throw away your medicines at www.disposemymeds.org.          This list is accurate as of 5/16/18  1:59 PM.  Always use your most recent med list.                   Brand Name Dispense Instructions for use Diagnosis    * ALLERGEN IMMUNOTHERAPY PRESCRIPTION     5 mL    Cat Hair, Standardized 10,000 BAU/mL, ALK  3.0 ml Dog Hair Dander, A. P.  1:100 w/v, HS  1.0 ml Dust Mites F 30,000AU/mL, HS  0.5 ml Dust Mites P. 30,000 AU/mL, HS  0.5 ml  Diluent: HSA qs to 5ml    Allergic rhinitis due to animal dander, Allergic rhinitis due to dust mite       * ALLERGEN IMMUNOTHERAPY PRESCRIPTION     5 mL    Alternaria Tenuis 1:10 w/v, HS  0.5 ml Epicoccum Nigrum 1:10 w/v, HS 0.5 ml Hormodendrum Cladosporioides 1:10 w/v, HS 0.5 ml Diluent: HSA qs to 5ml    Allergic rhinitis due to mold       * ALLERGEN IMMUNOTHERAPY PRESCRIPTION     5 mL    Estuardo, White  1:20 w/v, HS  0.5 ml Birch Mix PRW 1:20 w/v, HS  0.5 ml Elm, American 1:20 w/v, HS  0.5 ml Hackberry 1:20 w/v, HS 0.5 ml Hickory, Shagbark 1:20 w/v, HS  0.5 ml [place]bury Mix RW 1:20 w/v, HS 0.5 ml Oak Mix RVW 1:20 w/v, HS 0.5 ml [place] Tree, Black 1:20 w/v, HS 0.5 ml Weinert, Black 1:20 w/v, HS 0.5 ml Diluent: HSA qs to 5ml    Chronic seasonal allergic rhinitis due to pollen       * ALLERGEN IMMUNOTHERAPY PRESCRIPTION     5 mL    Kochia 1:20 w/v, HS 1.0 ml Nettle 1:20 w/v, HS 1.0 ml Plantain, English 1:20 w/v, HS 1.0 ml Ragweed Mixed 1:20 w/v ALK  0.6 ml Russian Thistle 1:20 w/v, HS 1.0 ml Diluent: HSA qs to 5ml    Chronic seasonal allergic rhinitis due to pollen       cetirizine 10 MG tablet    zyrTEC    30 tablet    Take 1 tablet (10 mg) by mouth daily as needed for allergies    Chronic seasonal allergic rhinitis due to  pollen       EPINEPHrine 0.3 MG/0.3ML injection 2-pack    EPIPEN 2-ODETTE    0.6 mL    Inject 0.3 mLs (0.3 mg) into the muscle once as needed for anaphylaxis    Need for desensitization to allergens       levonorgestrel 20 MCG/24HR IUD    MIRENA     1 each (20 mcg) by Intrauterine route continuous    Encounter for insertion of mirena IUD       melatonin 3 MG tablet     30 tablet    Take 1 tablet (3 mg) by mouth At Bedtime    Insomnia, unspecified type       traZODone 50 MG tablet    DESYREL    30 tablet    Take 1 tablet (50 mg) by mouth At Bedtime    Insomnia, unspecified type       * Notice:  This list has 4 medication(s) that are the same as other medications prescribed for you. Read the directions carefully, and ask your doctor or other care provider to review them with you.       Contraindicated

## 2018-05-16 NOTE — PATIENT INSTRUCTIONS
Allergy Staff Appt Hours Shot Hours Locations    Physician     Magdy Blevins DO       Support Staff     Nessa SIERRA, RN      Neeru SIERRA, [facility]  Monday:                      [place] 8-7     Tuesday:         [place] 8-5     Wednesday:        [place]: 7-5     Friday:        [place] 7-5   [place]        Monday: 9-5:50        Wednesday: 2-5:50        Friday: 7-12:50     [place]        Tuesday: 7-10:50        Thursday: 1:30-6:30        Friday: 8:00-3:50     [place] Monday: 7:10-4:50        Tuesday: 12:30-6:30        Thursday: 7-11:50 [facility]  [address] [place] [address]  Appt Line: [phone]  Allergy RN (Monday):  [phone]    [facility]  [address] [place] [address]  Appt Line: [phone]  Allergy RN (Tues & Wed):  [phone]    [facility]  [address] [place] [address]  Appt Line: [phone]  Allergy RN (Friday):  [phone]       Important Scheduling Information  Aspirin Desensitization: Appt will last 2 clinic days. Please call the Allergy RN line for your clinic to schedule. Discontinue antihistamines 7 days prior to the appointment.     Food Challenges: Appt will last 3-4 hours. Please call the Allergy RN line for your clinic to schedule. Discontinue antihistamines 7 days prior to the appointment.     Penicillin Testing: Appt will last 2-3 hours. Please call the Allergy RN line for your clinic to schedule. Discontinue antihistamines 7 days prior to the appointment.     Skin Testing: Appt will about 40 minutes. Call the appointment line for your clinic to schedule. Discontinue antihistamines 7 days prior to the appointment.     Venom Testing: Appt will last 2-3 hours. Please call the Allergy RN line for your clinic to schedule. Discontinue antihistamines 7 days prior to the appointment.     Thank you for trusting us with your Allergy, Asthma, and Immunology care. Please feel free to contact us with any questions or concerns you may  have.

## 2018-05-16 NOTE — LETTER
5/16/2018         RE: Maddy Ortiz  [address]   [facility] [address]        Dear Colleague,    Thank you for referring your patient, Maddy Ortiz, to the [facility]. Please see a copy of my visit note below.    Maddy rOtiz is a 32 year old  female with previous medical history significant for allergic rhinitis who returns for a follow up visit. Maddy Ortiz is being seen today for seasonal allergies.     The patient returns for follow-up.  She was last seen in early April and was due to receive cluster immunotherapy at that time.  However, when she was seen she was psychiatrically unstable.  We did not proceed with cluster immunotherapy at that time and requested that she follow-up with your psychiatrist and primary care physician.  She did this and was admitted and been subsequently discharged.  She is doing much better from a psychiatric standpoint.  She is wishing to resume allergen immunotherapy.  She took to cetirizine prior to today's appointment.  She last received an allergy shot in March 2018.  At that time she received yellow 0.25 mL.    Past Medical History:   Diagnosis Date     Bipolar 1 disorder (H) 12/6/2012     Depressive disorder      Paranoid type delusional disorder (H) 11/14/2012     Family History   Problem Relation Age of Onset     Adopted: Yes     Unknown/Adopted Mother      Unknown/Adopted Father      Unknown/Adopted Other      Past Surgical History:   Procedure Laterality Date     TONSILLECTOMY & ADENOIDECTOMY      as a child       REVIEW OF SYSTEMS:  General: positive  for weight gain. negative for weight loss. negative for changes in sleep.   Ears: negative for fullness. negative for hearing loss. negative for dizziness.   Nose: negative for snoring.negative for changes in smell. negative for drainage.   Throat: positive  for hoarseness. positive  for sore throat. negative for trouble swallowing.   Lungs: negative for shortness of  breath.negative for wheezing. negative for sputum production.   Cardiovascular: negative for chest pain. negative for swelling of ankles. negative for fast or irregular heartbeat.   Gastrointestinal: negative for nausea. negative for heartburn. negative for acid reflux.   Musculoskeletal: negative for joint pain. negative for joint stiffness. negative for joint swelling.   Neurologic: negative for seizures. negative for fainting. negative for weakness.   Psychiatric: negative for changes in mood. negative for anxiety.   Endocrine: negative for cold intolerance. negative for heat intolerance. negative for tremors.   Hematologic: negative for easy bruising. negative for easy bleeding.  Integumentary: negative for rash. negative for scaling. negative for nail changes.       Current Outpatient Prescriptions:      cetirizine (ZYRTEC) 10 MG tablet, Take 1 tablet (10 mg) by mouth daily as needed for allergies, Disp: 30 tablet, Rfl: 0     EPINEPHrine (EPIPEN 2-ODETTE) 0.3 MG/0.3ML injection, Inject 0.3 mLs (0.3 mg) into the muscle once as needed for anaphylaxis, Disp: 0.6 mL, Rfl: 3     levonorgestrel (MIRENA) 20 MCG/24HR IUD, 1 each (20 mcg) by Intrauterine route continuous, Disp: , Rfl:      melatonin 3 MG tablet, Take 1 tablet (3 mg) by mouth At Bedtime, Disp: 30 tablet, Rfl: 0     traZODone (DESYREL) 50 MG tablet, Take 1 tablet (50 mg) by mouth At Bedtime, Disp: 30 tablet, Rfl: 3     ORDER FOR ALLERGEN IMMUNOTHERAPY, Cat Hair, Standardized 10,000 BAU/mL, ALK  3.0 ml Dog Hair Dander, A. P.  1:100 w/v, HS  1.0 ml Dust Mites F 30,000AU/mL, HS  0.5 ml Dust Mites P. 30,000 AU/mL, HS  0.5 ml  Diluent: HSA qs to 5ml, Disp: 5 mL, Rfl: PRN     ORDER FOR ALLERGEN IMMUNOTHERAPY, Alternaria Tenuis 1:10 w/v, HS  0.5 ml Epicoccum Nigrum 1:10 w/v, HS 0.5 ml Hormodendrum Cladosporioides 1:10 w/v, HS 0.5 ml Diluent: HSA qs to 5ml, Disp: 5 mL, Rfl: PRN     ORDER FOR ALLERGEN IMMUNOTHERAPY, Estuardo, White  1:20 w/v, HS  0.5 ml Birch Mix PRW 1:20  w/v, HS  0.5 ml Elm, American 1:20 w/v, HS  0.5 ml Hackberry 1:20 w/v, HS 0.5 ml Hickory, Shagbark 1:20 w/v, HS  0.5 ml Reddell Mix RW 1:20 w/v, HS 0.5 ml Oak Mix RVW 1:20 w/v, HS 0.5 ml Norman Tree, Black 1:20 w/v, HS 0.5 ml [place], Black 1:20 w/v, HS 0.5 ml Diluent: HSA qs to 5ml, Disp: 5 mL, Rfl: PRN     ORDER FOR ALLERGEN IMMUNOTHERAPY, Kochia 1:20 w/v, HS 1.0 ml Nettle 1:20 w/v, HS 1.0 ml Plantain, English 1:20 w/v, HS 1.0 ml Ragweed Mixed 1:20 w/v ALK  0.6 ml Russian Thistle 1:20 w/v, HS 1.0 ml Diluent: HSA qs to 5ml, Disp: 5 mL, Rfl: PRN  Immunization History   Administered Date(s) Administered     Influenza (IIV3) PF 12/06/2012     Influenza Vaccine IM 3yrs+ 4 Valent IIV4 11/21/2017     TDAP Vaccine (Adacel) 12/06/2012     Allergies   Allergen Reactions     Animal Dander      Nkda [No Known Drug Allergies]      Seasonal Allergies      Weeds and mold         EXAM:   Constitutional:  Appears well-developed and well-nourished. No distress.   HEENT:   Head: Normocephalic.   Mouth/Throat: No oropharyngeal exudate present.   No cobblestoning of posterior oropharynx.   Nasal tissue pink and normal appearing.  No rhinorrhea noted.    Eyes: Conjunctivae are non-erythematous   Cardiovascular: Normal rate, regular rhythm and normal heart sounds. Exam reveals no gallop and no friction rub.   No murmur heard.  Respiratory: Effort normal and breath sounds normal. No respiratory distress. No wheezes. No rales.   Musculoskeletal: Normal range of motion.   Neuro: Oriented to person, place, and time.  Skin: Skin is warm and dry. No rash noted.   Psychiatric: Normal mood and affect.     Nursing note and vitals reviewed.          ASSESSMENT/PLAN:  Problem List Items Addressed This Visit        Respiratory    Seasonal allergic rhinitis due to pollen - Primary     History of nasal and ocular symptoms for multiple years. Perennial with spring and fall worsening. Tried cetirizine and loratadine which were helpful. Currently used  Allegra as needed. Allergy testing done in 2013 positive for dust mite, cat, dog, molds, trees, grasses and weeds. Was tolerating cluster but stopped given psychiatric concerns. She has followed with psychiatrist and now in a place to resume shots and feeling much better from a psychiatric standpoint. Last received yellow 0.25ml in late march of 2018.       Skin testing  Positive for cat, dog, dust mites, weeds, trees and molds.       - Flonase 2 spray/nostril daily. Use spring and fall at the very least.   - Allegra or Zyrtec as needed and on allergy shot days.   - Continue allergen immunotherapy. Return to clinic to continue cluster immunotherapy. Will reduce dose to blue 0.2ml and resume building.          Allergic rhinitis due to mold    Allergic rhinitis due to animal dander    Allergic rhinitis due to dust mite          Chart documentation with Dragon Voice recognition Software. Although reviewed after completion, some words and grammatical errors may remain.    Magdy Blevins, DO   Allergy/Immunology  [facility] [place] and [facility]      Again, thank you for allowing me to participate in the care of your patient.        Sincerely,        Magdy Blevins, DO

## 2018-05-16 NOTE — PROGRESS NOTES
Maddy Ortiz is a 32 year old  female with previous medical history significant for allergic rhinitis who returns for a follow up visit. Maddy Ortiz is being seen today for seasonal allergies.     The patient returns for follow-up.  She was last seen in early April and was due to receive cluster immunotherapy at that time.  However, when she was seen she was psychiatrically unstable.  We did not proceed with cluster immunotherapy at that time and requested that she follow-up with your psychiatrist and primary care physician.  She did this and was admitted and been subsequently discharged.  She is doing much better from a psychiatric standpoint.  She is wishing to resume allergen immunotherapy.  She took to cetirizine prior to today's appointment.  She last received an allergy shot in March 2018.  At that time she received yellow 0.25 mL.    Past Medical History:   Diagnosis Date     Bipolar 1 disorder (H) 12/6/2012     Depressive disorder      Paranoid type delusional disorder (H) 11/14/2012     Family History   Problem Relation Age of Onset     Adopted: Yes     Unknown/Adopted Mother      Unknown/Adopted Father      Unknown/Adopted Other      Past Surgical History:   Procedure Laterality Date     TONSILLECTOMY & ADENOIDECTOMY      as a child       REVIEW OF SYSTEMS:  General: positive  for weight gain. negative for weight loss. negative for changes in sleep.   Ears: negative for fullness. negative for hearing loss. negative for dizziness.   Nose: negative for snoring.negative for changes in smell. negative for drainage.   Throat: positive  for hoarseness. positive  for sore throat. negative for trouble swallowing.   Lungs: negative for shortness of breath.negative for wheezing. negative for sputum production.   Cardiovascular: negative for chest pain. negative for swelling of ankles. negative for fast or irregular heartbeat.   Gastrointestinal: negative for nausea. negative for heartburn. negative for acid  reflux.   Musculoskeletal: negative for joint pain. negative for joint stiffness. negative for joint swelling.   Neurologic: negative for seizures. negative for fainting. negative for weakness.   Psychiatric: negative for changes in mood. negative for anxiety.   Endocrine: negative for cold intolerance. negative for heat intolerance. negative for tremors.   Hematologic: negative for easy bruising. negative for easy bleeding.  Integumentary: negative for rash. negative for scaling. negative for nail changes.       Current Outpatient Prescriptions:      cetirizine (ZYRTEC) 10 MG tablet, Take 1 tablet (10 mg) by mouth daily as needed for allergies, Disp: 30 tablet, Rfl: 0     EPINEPHrine (EPIPEN 2-ODETTE) 0.3 MG/0.3ML injection, Inject 0.3 mLs (0.3 mg) into the muscle once as needed for anaphylaxis, Disp: 0.6 mL, Rfl: 3     levonorgestrel (MIRENA) 20 MCG/24HR IUD, 1 each (20 mcg) by Intrauterine route continuous, Disp: , Rfl:      melatonin 3 MG tablet, Take 1 tablet (3 mg) by mouth At Bedtime, Disp: 30 tablet, Rfl: 0     traZODone (DESYREL) 50 MG tablet, Take 1 tablet (50 mg) by mouth At Bedtime, Disp: 30 tablet, Rfl: 3     ORDER FOR ALLERGEN IMMUNOTHERAPY, Cat Hair, Standardized 10,000 BAU/mL, ALK  3.0 ml Dog Hair Dander, A. P.  1:100 w/v, HS  1.0 ml Dust Mites F 30,000AU/mL, HS  0.5 ml Dust Mites P. 30,000 AU/mL, HS  0.5 ml  Diluent: HSA qs to 5ml, Disp: 5 mL, Rfl: PRN     ORDER FOR ALLERGEN IMMUNOTHERAPY, Alternaria Tenuis 1:10 w/v, HS  0.5 ml Epicoccum Nigrum 1:10 w/v, HS 0.5 ml Hormodendrum Cladosporioides 1:10 w/v, HS 0.5 ml Diluent: HSA qs to 5ml, Disp: 5 mL, Rfl: PRN     ORDER FOR ALLERGEN IMMUNOTHERAPY, Estuardo, White  1:20 w/v, HS  0.5 ml Birch Mix PRW 1:20 w/v, HS  0.5 ml Elm, American 1:20 w/v, HS  0.5 ml Hackberry 1:20 w/v, HS 0.5 ml Hickory, Shagbark 1:20 w/v, HS  0.5 ml [place] Mix RW 1:20 w/v, HS 0.5 ml Oak Mix RVW 1:20 w/v, HS 0.5 ml [place] Tree, Black 1:20 w/v, HS 0.5 ml [place], Black 1:20 w/v, HS 0.5 ml  Diluent: HSA qs to 5ml, Disp: 5 mL, Rfl: PRN     ORDER FOR ALLERGEN IMMUNOTHERAPY, Kochia 1:20 w/v, HS 1.0 ml Nettle 1:20 w/v, HS 1.0 ml Plantain, English 1:20 w/v, HS 1.0 ml Ragweed Mixed 1:20 w/v ALK  0.6 ml Russian Thistle 1:20 w/v, HS 1.0 ml Diluent: HSA qs to 5ml, Disp: 5 mL, Rfl: PRN  Immunization History   Administered Date(s) Administered     Influenza (IIV3) PF 12/06/2012     Influenza Vaccine IM 3yrs+ 4 Valent IIV4 11/21/2017     TDAP Vaccine (Adacel) 12/06/2012     Allergies   Allergen Reactions     Animal Dander      Nkda [No Known Drug Allergies]      Seasonal Allergies      Weeds and mold         EXAM:   Constitutional:  Appears well-developed and well-nourished. No distress.   HEENT:   Head: Normocephalic.   Mouth/Throat: No oropharyngeal exudate present.   No cobblestoning of posterior oropharynx.   Nasal tissue pink and normal appearing.  No rhinorrhea noted.    Eyes: Conjunctivae are non-erythematous   Cardiovascular: Normal rate, regular rhythm and normal heart sounds. Exam reveals no gallop and no friction rub.   No murmur heard.  Respiratory: Effort normal and breath sounds normal. No respiratory distress. No wheezes. No rales.   Musculoskeletal: Normal range of motion.   Neuro: Oriented to person, place, and time.  Skin: Skin is warm and dry. No rash noted.   Psychiatric: Normal mood and affect.     Nursing note and vitals reviewed.          ASSESSMENT/PLAN:  Problem List Items Addressed This Visit        Respiratory    Seasonal allergic rhinitis due to pollen - Primary     History of nasal and ocular symptoms for multiple years. Perennial with spring and fall worsening. Tried cetirizine and loratadine which were helpful. Currently used Allegra as needed. Allergy testing done in 2013 positive for dust mite, cat, dog, molds, trees, grasses and weeds. Was tolerating cluster but stopped given psychiatric concerns. She has followed with psychiatrist and now in a place to resume shots and feeling  much better from a psychiatric standpoint. Last received yellow 0.25ml in late march of 2018.       Skin testing  Positive for cat, dog, dust mites, weeds, trees and molds.       - Flonase 2 spray/nostril daily. Use spring and fall at the very least.   - Allegra or Zyrtec as needed and on allergy shot days.   - Continue allergen immunotherapy. Return to clinic to continue cluster immunotherapy. Will reduce dose to blue 0.2ml and resume building.          Allergic rhinitis due to mold    Allergic rhinitis due to animal dander    Allergic rhinitis due to dust mite          Chart documentation with Dragon Voice recognition Software. Although reviewed after completion, some words and grammatical errors may remain.    Magdy Blevins,    Allergy/Immunology  [facility], [place] and [facility]

## 2018-05-17 NOTE — TELEPHONE ENCOUNTER
"Phone call from Maddy, who said that she had a \"personal emergency,\" and wouldn't be here today.      Nely Arthur, Jacek., D,  L.P.  Adult Day Tx  "

## 2018-05-18 ENCOUNTER — OFFICE VISIT (OUTPATIENT)
Dept: FAMILY MEDICINE | Facility: CLINIC | Age: 33
End: 2018-05-18
Payer: COMMERCIAL

## 2018-05-18 VITALS
SYSTOLIC BLOOD PRESSURE: 123 MMHG | WEIGHT: 193.5 LBS | HEART RATE: 81 BPM | BODY MASS INDEX: 32.24 KG/M2 | TEMPERATURE: 97.8 F | HEIGHT: 65 IN | DIASTOLIC BLOOD PRESSURE: 85 MMHG

## 2018-05-18 DIAGNOSIS — Z02.89 ENCOUNTER FOR COMPLETION OF FORM WITH PATIENT: Primary | ICD-10-CM

## 2018-05-18 PROCEDURE — 99212 OFFICE O/P EST SF 10 MIN: CPT | Performed by: NURSE PRACTITIONER

## 2018-05-18 RX ORDER — HALOPERIDOL 0.5 MG/1
1 TABLET ORAL
Status: ON HOLD | COMMUNITY
End: 2018-08-24

## 2018-05-18 NOTE — MR AVS SNAPSHOT
After Visit Summary   5/18/2018    Maddy Ortiz    MRN: [id number]           Patient Information     Date Of Birth          1985        Visit Information        Provider Department      5/18/2018 9:20 AM Shirley Freeman APRN [facility]        Today's Diagnoses     Encounter for completion of form with patient    -  1      Care Instructions          Thank you for choosing [facility].  You may be receiving a survey in the mail from Roma Longoria regarding your visit today.  Please take a few minutes to complete and return the survey to let us know how we are doing.      If you have questions or concerns, please contact us via [employer] or you can contact your care team at [phone].    Our Clinic hours are:  Monday 6:40 am  to 7:00 pm  Tuesday -Friday 6:40 am to 5:00 pm    The [place] outpatient lab hours are:  Monday - Friday 6:10 am to 4:45 pm  Saturdays 7:00 am to 11:00 am  Appointments are required, call [phone]    If you have clinical questions after hours or would like to schedule an appointment,  call the clinic at [phone].            Follow-ups after your visit        Your next 10 appointments already scheduled     May 21, 2018  1:00 PM CDT   Return Visit with ADULT  DAY 4C [facility] ([facility])    [address] [place] [id number] [phone]            May 22, 2018  1:00 PM CDT   Return Visit with ADULT  DAY 4C   [facility] ([facility])    [address] [place] [address]   [phone]            May 23, 2018  2:00 PM CDT   [facility] Magdy Blevins DO   [facility]    [address]  [facility] [address] [phone]            May 24, 2018  1:00 PM CDT   Return Visit with ADULT  DAY [address] [place] [address]  [facility] ([facility])    [facility] [address] [address]   [phone]            May 29, 2018  1:00 PM CDT   Return Visit with ADULT MH DAY 4C   [facility] ([facility])    [facility] [address] [address]   [phone]            May 30, 2018  1:00 PM CDT   Return Visit with Magdy Blevins DO   [facility]    [address]  [facility] [address]   [phone]            May 31, 2018  1:00 PM CDT   Return Visit with ADULT MH DAY 4C   [facility] ([facility])    [address] [place] [address]   [phone]            Jun 04, 2018  1:00 PM CDT   Return Visit with ADULT MH DAY 4C   [facility] ([facility])    [address] [place] [address]   [phone]            Jun 05, 2018  1:00 PM CDT   Return Visit with ADULT MH DAY 4C   [facility] ([facility])    [address] [place] [address]   [phone]            Jun 06, 2018  1:00 PM CDT   Return Visit with Magdy Blevins DO   [facility]    [address]  [facility] [address]   [phone]              Who to contact     If you have questions or need follow up information about today's clinic visit or your schedule please contact [facility] directly at [phone].  Normal or non-critical lab and imaging results will be communicated to you by MyChart, letter or phone within 4 business days after the clinic has received the results. If you do not hear from us within 7 days, please contact the clinic through MyChart or phone. If you  "have a critical or abnormal lab result, we will notify you by phone as soon as possible.  Submit refill requests through Gogo or call your pharmacy and they will forward the refill request to us. Please allow 3 business days for your refill to be completed.          Additional Information About Your Visit        [facility] Information     Gogo lets you send messages to your doctor, view your test results, renew your prescriptions, schedule appointments and more. To sign up, go to [email or web address] . Click on \"Log in\" on the left side of the screen, which will take you to the Welcome page. Then click on \"Sign up Now\" on the right side of the page.     You will be asked to enter the access code listed below, as well as some personal information. Please follow the directions to create your username and password.     Your access code is: [email or web address]  Expires: 2018  3:10 PM     Your access code will  in 90 days. If you need help or a new code, please call your [facility] clinic or [phone].        Care EveryWhere ID     This is your Care EveryWhere ID. This could be used by other organizations to access your [facility] medical records  [id number]        Your Vitals Were     Pulse Temperature Height BMI (Body Mass Index)          81 97.8  F (36.6  C) (Tympanic) 5' 5\" (1.651 m) 32.2 kg/m2         Blood Pressure from Last 3 Encounters:   18 123/85   18 118/76   05/15/18 (!) 143/97    Weight from Last 3 Encounters:   18 193 lb 8 oz (87.8 kg)   18 194 lb (88 kg)   05/15/18 195 lb (88.5 kg)              Today, you had the following     No orders found for display       Primary Care Provider Office Phone # Fax #    [facility] [phone] [address] [address]        Equal Access to Services     ARMOND MCKEON : Kamryn Ackerman, pancho rondon, qaybta kaalmada adeeglincoln gustafson. So " [facility] [phone].    ATENCIÓN: Si elida west, tiene a titus disposición servicios gratuitos de asistencia lingüística. Yadira yuen [phone].    We comply with applicable federal civil rights laws and [place] laws. We do not discriminate on the basis of race, color, national origin, age, disability, sex, sexual orientation, or gender identity.            Thank you!     Thank you for choosing [facility]  for your care. Our goal is always to provide you with excellent care. Hearing back from our patients is one way we can continue to improve our services. Please take a few minutes to complete the written survey that you may receive in the mail after your visit with us. Thank you!             Your Updated Medication List - Protect others around you: Learn how to safely use, store and throw away your medicines at www.disposemymeds.org.          This list is accurate as of 5/18/18 10:36 AM.  Always use your most recent med list.                   Brand Name Dispense Instructions for use Diagnosis    * ALLERGEN IMMUNOTHERAPY PRESCRIPTION     5 mL    Cat Hair, Standardized 10,000 BAU/mL, ALK  3.0 ml Dog Hair Dander, A. P.  1:100 w/v, HS  1.0 ml Dust Mites F 30,000AU/mL, HS  0.5 ml Dust Mites P. 30,000 AU/mL, HS  0.5 ml  Diluent: HSA qs to 5ml    Allergic rhinitis due to animal dander, Allergic rhinitis due to dust mite       * ALLERGEN IMMUNOTHERAPY PRESCRIPTION     5 mL    Alternaria Tenuis 1:10 w/v, HS  0.5 ml Epicoccum Nigrum 1:10 w/v, HS 0.5 ml Hormodendrum Cladosporioides 1:10 w/v, HS 0.5 ml Diluent: HSA qs to 5ml    Allergic rhinitis due to mold       * ALLERGEN IMMUNOTHERAPY PRESCRIPTION     5 mL    Estuardo, White  1:20 w/v, HS  0.5 ml Birch Mix PRW 1:20 w/v, HS  0.5 ml Elm, American 1:20 w/v, HS  0.5 ml Hackberry 1:20 w/v, HS 0.5 ml Hickory, Shagbark 1:20 w/v, HS  0.5 ml [place] Mix RW 1:20 w/v, HS 0.5 ml Oak Mix RVW 1:20 w/v, HS 0.5 ml [place] Tree, Black 1:20 w/v, HS 0.5 ml [place], Black 1:20 w/v, HS  0.5 ml Diluent: HSA qs to 5ml    Chronic seasonal allergic rhinitis due to pollen       * ALLERGEN IMMUNOTHERAPY PRESCRIPTION     5 mL    Kochia 1:20 w/v, HS 1.0 ml Nettle 1:20 w/v, HS 1.0 ml Plantain, English 1:20 w/v, HS 1.0 ml Ragweed Mixed 1:20 w/v ALK  0.6 ml Russian Thistle 1:20 w/v, HS 1.0 ml Diluent: HSA qs to 5ml    Chronic seasonal allergic rhinitis due to pollen       cetirizine 10 MG tablet    zyrTEC    30 tablet    Take 1 tablet (10 mg) by mouth daily as needed for allergies    Chronic seasonal allergic rhinitis due to pollen       EPINEPHrine 0.3 MG/0.3ML injection 2-pack    EPIPEN 2-ODETTE    0.6 mL    Inject 0.3 mLs (0.3 mg) into the muscle once as needed for anaphylaxis    Need for desensitization to allergens       haloperidol 0.5 MG tablet    HALDOL     Take 1 mg by mouth 2 times daily 3mg in the morning;  4 mg at night        levonorgestrel 20 MCG/24HR IUD    MIRENA     1 each (20 mcg) by Intrauterine route continuous    Encounter for insertion of mirena IUD       melatonin 3 MG tablet     30 tablet    Take 1 tablet (3 mg) by mouth At Bedtime    Insomnia, unspecified type       traZODone 50 MG tablet    DESYREL    30 tablet    Take 1 tablet (50 mg) by mouth At Bedtime    Insomnia, unspecified type       * Notice:  This list has 4 medication(s) that are the same as other medications prescribed for you. Read the directions carefully, and ask your doctor or other care provider to review them with you.

## 2018-05-18 NOTE — PROGRESS NOTES
"  SUBJECTIVE:   Maddy Ortiz is a 32 year old female who presents to clinic today for the following health issues:      Chief Complaint   Patient presents with     Forms     consent for healthcare provider to release medical records-  plasma donor; [facility] plasma facility  Fax to [phone]     Weight Problem     referral for weight loss clinic;  is there a different one than the [facility]?  - can't get in until July         Problem list and histories reviewed & adjusted, as indicated.  Additional history: as documented    Reviewed and updated as needed this visit by clinical staff  Tobacco  Allergies  Meds       Reviewed and updated as needed this visit by Provider           OBJECTIVE:     /85 (BP Location: Right arm)  Pulse 81  Temp 97.8  F (36.6  C) (Tympanic)  Ht 5' 5\" (1.651 m)  Wt 193 lb 8 oz (87.8 kg)  BMI 32.2 kg/m2  Body mass index is 32.2 kg/(m^2).  GENERAL: healthy, alert and no distress    Lab Results   Component Value Date    WBC 14.1 04/23/2018     Lab Results   Component Value Date    RBC 4.70 04/23/2018     Lab Results   Component Value Date    HGB 14.0 04/23/2018     Lab Results   Component Value Date    HCT 41.0 04/23/2018     No components found for: MCT  Lab Results   Component Value Date    MCV 87 04/23/2018     Lab Results   Component Value Date    MCH 29.8 04/23/2018     Lab Results   Component Value Date    MCHC 34.1 04/23/2018     Lab Results   Component Value Date    RDW 11.9 04/23/2018     Lab Results   Component Value Date     04/23/2018       ASSESSMENT/PLAN:       ICD-10-CM    1. Encounter for completion of form with patient Z02.89      Form signed - I see no contraindication to plasma donation.      The risks, benefits and treatment options of prescribed medications or other treatments have been discussed with the patient. The patient verbalized their understanding and should call or follow up if no improvement or if they develop further problems.    Shirley Andrade " PHILL [facility]

## 2018-05-18 NOTE — PATIENT INSTRUCTIONS
Thank you for choosing [facility].  You may be receiving a survey in the mail from Roma Longoria regarding your visit today.  Please take a few minutes to complete and return the survey to let us know how we are doing.      If you have questions or concerns, please contact us via [employer] or you can contact your care team at [phone].    Our Clinic hours are:  Monday 6:40 am  to 7:00 pm  Tuesday -Friday 6:40 am to 5:00 pm    The [place] outpatient lab hours are:  Monday - Friday 6:10 am to 4:45 pm  Saturdays 7:00 am to 11:00 am  Appointments are required, call [phone]    If you have clinical questions after hours or would like to schedule an appointment,  call the clinic at [phone].

## 2018-05-21 ENCOUNTER — HOSPITAL ENCOUNTER (OUTPATIENT)
Dept: BEHAVIORAL HEALTH | Facility: CLINIC | Age: 33
End: 2018-05-21
Attending: PSYCHIATRY & NEUROLOGY
Payer: COMMERCIAL

## 2018-05-21 PROCEDURE — H2012 BEHAV HLTH DAY TREAT, PER HR: HCPCS

## 2018-05-21 NOTE — PROGRESS NOTES
"Adult Mental Health Outpatient Group Therapy Progress Note       Name of Group:  4C Group Therapy   Time:            2:00-2:50 pm  Group Therapy      Date:              5/10//2018    Therapist:      Dariusz Spaulding, D,  L.P.          Client Initial Individualized Goals for Treatment:   \"to prepare for a healthy lifestyle\".        Diagnosis  295.70  (F25) Schizoaffective Disorder Bipolar Type      Treatment Goals:   1.  Personal Safety  Report any urges or thoughts to harm yourself to the Tx team.      2.  Self-Support Skills:  practice coping skills/strategies to help   3.  Group Therapy learn and practice 2 skills to manage the anxiety, depression and reduce negative thoughts.   4.  Community Resources: Find a support group near home, such as [facility]      Area of Treatment Focus:  Symptom Management  Personal Safety      Therapeutic Interventions/Treatment Strategies:  Support, Feedback, Safety Assessments, Structured Activity and Education      Response to Treatment Strategies:  Accepted Feedback, Listened, Attentive, Accepted Support and Alert      Description and Therapeutic Outcome:                         4C  Group Therapy   Time:     2:00-2:50 pm  Group Therapy                                 Maddy reported being safe today.  She participated for part of a group therapy with another Thought Disorder group, but left early and a staff talked to her about the group.  She stated that she preferred to check in with current staff.  She reported that she didn't notice any changes in her medications at this time. She reported that she stayed home on the weekend and rested. She stated that she has run out of money and was trying to problem-solve on how to get more money. She decided to try to ride her bike to a plasma center to get paid for donating plasma, but realized it was too far to bike. She then decided to try to call the [facility] to try to get more financial assistance and get an appointment with the Food [facility] [employer].  She decided after this that she would ask her boyfriend to borrow his car to go to the plasma center to donate and get paid for her plasma.  She planned to call the [facility] to ask about the [employer] appointment.      Client demonstrated understanding of session content by participating in the group therapy discussion, and sharing ideas with others in the group for ways to cope.      Client will benefit from additional opportunities to practice and implement content from this session and receive feedback from the group.        The group practiced 10 minutes of mindfulness.      Is this a Weekly Review of the Progress on the Treatment Plan?  Yes.        Are Treatment Plan Goals being addressed?  Yes, continue treatment goals          Are Treatment Plan Strategies to Address Goals Effective?  Yes, continue treatment strategies          Are there any current contracts in place?  No

## 2018-05-22 ENCOUNTER — HOSPITAL ENCOUNTER (OUTPATIENT)
Dept: BEHAVIORAL HEALTH | Facility: CLINIC | Age: 33
End: 2018-05-22
Attending: PSYCHIATRY & NEUROLOGY
Payer: COMMERCIAL

## 2018-05-22 PROCEDURE — H2012 BEHAV HLTH DAY TREAT, PER HR: HCPCS

## 2018-05-22 ASSESSMENT — ANXIETY QUESTIONNAIRES
2. NOT BEING ABLE TO STOP OR CONTROL WORRYING: NOT AT ALL
6. BECOMING EASILY ANNOYED OR IRRITABLE: NOT AT ALL
1. FEELING NERVOUS, ANXIOUS, OR ON EDGE: SEVERAL DAYS
5. BEING SO RESTLESS THAT IT IS HARD TO SIT STILL: NOT AT ALL
GAD7 TOTAL SCORE: 5
IF YOU CHECKED OFF ANY PROBLEMS ON THIS QUESTIONNAIRE, HOW DIFFICULT HAVE THESE PROBLEMS MADE IT FOR YOU TO DO YOUR WORK, TAKE CARE OF THINGS AT HOME, OR GET ALONG WITH OTHER PEOPLE: EXTREMELY DIFFICULT
3. WORRYING TOO MUCH ABOUT DIFFERENT THINGS: NOT AT ALL
7. FEELING AFRAID AS IF SOMETHING AWFUL MIGHT HAPPEN: NEARLY EVERY DAY

## 2018-05-22 ASSESSMENT — PATIENT HEALTH QUESTIONNAIRE - PHQ9: 5. POOR APPETITE OR OVEREATING: SEVERAL DAYS

## 2018-05-22 NOTE — PROGRESS NOTES
"Adult Mental Health Outpatient Group Therapy Progress Note       Name of Group:  4C Group Therapy   Time:            2:00-2:50 pm  Group Therapy      Date:              5/22//2018    Therapist:      Dariusz Spaulding, D,  L.P.          Client Initial Individualized Goals for Treatment:   \"to prepare for a healthy lifestyle\".        Diagnosis  295.70  (F25) Schizoaffective Disorder Bipolar Type      Treatment Goals:   1.  Personal Safety  Report any urges or thoughts to harm yourself to the Tx team.      2.  Self-Support Skills:  practice coping skills/strategies to help   3.  Group Therapy learn and practice 2 skills to manage the anxiety, depression and reduce negative thoughts.   4.  Community Resources: Find a support group near home, such as [facility]      Area of Treatment Focus:  Symptom Management  Personal Safety      Therapeutic Interventions/Treatment Strategies:  Support, Feedback, Safety Assessments, Structured Activity and Education      Response to Treatment Strategies:  Accepted Feedback, Listened, Attentive, Accepted Support and Alert      Description and Therapeutic Outcome:     4C  Mental Health Management 1:00 - 1:50 pm                  Maddy reported being safe today.  She participated in a structured activity of stretches and mindfulness with the group.  The group discussed where they felt stiffness and stress in the body and how the stretching helped alleviate it.                         The group participated in a structured activity that involved reading a worksheet about different strengths and writing about those that they felt were helpful for them.  The group identified activities that helped them feel more motivation, discussed hobbies that they had, and things that they would like to do.     Time:     2:00-2:50 pm  Group Therapy                                 Maddy reported being safe today.  She did not wear her sunglasses during group, but stated that she did feel anxious and took " a PRN medicine.  She asked to play some classical music, and put on some Vivaldi. She reported that she did some cooking over the weekend. She stated that she goes to a Mindfulness group each week and looks forward to it.  She talked to the group about her MAPPINGworking project and how she cleaned and organized her place.   She participated in talking about depression and anxiety issues and filled out a worksheet about them.  She reported that she didn't know why she felt so anxious.  She listened to other's situations and issues.  She left group once and excused herself.          Client demonstrated understanding of session content by participating in the group therapy discussion, and sharing ideas with others in the group for ways to cope.      Client will benefit from additional opportunities to practice and implement content from this session and receive feedback from the group.              Is this a Weekly Review of the Progress on the Treatment Plan?  Yes.        Are Treatment Plan Goals being addressed?  Yes, continue treatment goals          Are Treatment Plan Strategies to Address Goals Effective?  Yes, continue treatment strategies          Are there any current contracts in place?  No

## 2018-05-22 NOTE — PROGRESS NOTES
"Adult Mental Health Outpatient Group Therapy Progress Note     Client Initial Individualized Goals for Treatment: \"to prepare for a healthy lifestyle\"      See Initial Treatment suggestions for the client during the time between Diagnostic Assessment and completion of the Master Individualized Treatment Plan.    Treatment Goals:  Ask for more information, support and/or assistance as needed.  Follow up with providers/community supports as needed.  Report increases or changes in symptoms to staff.  Report any personal safety concerns to staff.   Take medications as prescribed.  Report medication changes and/or side effects to staff.  Attend and participate in groups as scheduled or notify staff if unable to do so.  Report any use of substances to staff as this may impact your symptoms and/or personal safety.  Notify staff if you have any other issues that need to be addressed. This may include  any current abuse / neglect / exploitation or other vulnerability.  Follow recommendations of your treatment team and discuss concerns if not in agreement.     Area of Treatment Focus:  Symptom Management    Therapeutic Interventions/Treatment Strategies:  Support, Feedback, Safety Assessments, Structured Activity and Education    Response to Treatment Strategies:  Accepted Feedback, Listened, Attentive, Accepted Support and Alert    Name of Group: Life Skills (3:00-3:50)    Description and Outcome:  Client presented with calm,even mood with fair concentration and focus.Psychoeducation information presented included a discussion related to stress management and coping skills Thought process clear,goal directed and reality based.  Client would benefit from additional opportunities to practice and implement content from this session in her every day life and mental health recovery.    Is this a Weekly Review of the Progress on the Treatment Plan?  Yes.      Are Treatment Plan Goals being addressed?  Yes, continue treatment " goals      Are Treatment Plan Strategies to Address Goals Effective?  Yes, continue treatment strategies      Are there any current contracts in place?  No

## 2018-05-23 ENCOUNTER — OFFICE VISIT (OUTPATIENT)
Dept: ALLERGY | Facility: OTHER | Age: 33
End: 2018-05-23
Payer: COMMERCIAL

## 2018-05-23 VITALS
TEMPERATURE: 97.7 F | SYSTOLIC BLOOD PRESSURE: 118 MMHG | DIASTOLIC BLOOD PRESSURE: 82 MMHG | OXYGEN SATURATION: 97 % | WEIGHT: 199 LBS | HEART RATE: 114 BPM | BODY MASS INDEX: 33.12 KG/M2

## 2018-05-23 DIAGNOSIS — J30.89 ALLERGIC RHINITIS DUE TO MOLD: ICD-10-CM

## 2018-05-23 DIAGNOSIS — J30.81 ALLERGIC RHINITIS DUE TO ANIMAL DANDER: ICD-10-CM

## 2018-05-23 DIAGNOSIS — J30.89 ALLERGIC RHINITIS DUE TO DUST MITE: ICD-10-CM

## 2018-05-23 DIAGNOSIS — Z51.6 NEED FOR DESENSITIZATION TO ALLERGENS: ICD-10-CM

## 2018-05-23 DIAGNOSIS — J30.1 CHRONIC SEASONAL ALLERGIC RHINITIS DUE TO POLLEN: Primary | ICD-10-CM

## 2018-05-23 PROCEDURE — 95180 RAPID DESENSITIZATION: CPT | Performed by: ALLERGY & IMMUNOLOGY

## 2018-05-23 PROCEDURE — 99207 ZZC DROP WITH A PROCEDURE: CPT | Mod: 25 | Performed by: ALLERGY & IMMUNOLOGY

## 2018-05-23 RX ORDER — EPINEPHRINE 0.3 MG/.3ML
0.3 INJECTION SUBCUTANEOUS
Qty: 0.6 ML | Refills: 3 | Status: SHIPPED | OUTPATIENT
Start: 2018-05-23 | End: 2018-06-06

## 2018-05-23 RX ORDER — CETIRIZINE HYDROCHLORIDE 10 MG/1
10 TABLET ORAL 2 TIMES DAILY
Qty: 60 TABLET | Refills: 11 | Status: ON HOLD | OUTPATIENT
Start: 2018-05-23 | End: 2018-08-24

## 2018-05-23 NOTE — PROGRESS NOTES
"Adult Mental Health Outpatient Group Therapy Progress Note     Client Initial Individualized Goals for Treatment: \"to prepare for a healthy lifestyle\"      See Initial Treatment suggestions for the client during the time between Diagnostic Assessment and completion of the Master Individualized Treatment Plan.    Treatment Goals:  1.Client will notify staff when needing assistance to develop or implement a coping plan to manage suicidal or self injurious urges.  2. When in self support skills group client will learn and practice 1-2 coping skills to help mange stress,decrease procrastination and make better personal decisions providing an update of progress weekly.     Area of Treatment Focus:  Symptom Management    Therapeutic Interventions/Treatment Strategies:  Support, Feedback, Safety Assessments, Structured Activity and Education    Response to Treatment Strategies:  Accepted Feedback, Listened, Attentive, Accepted Support and Alert    Name of Group: Life Skills (3:00-3:50)    Description and Outcome:  Client presented with calm,even mood with fair concentration and focus.Psychoeducation information presented included a discussion related to communication with a focus on social risk taking. Demonstrated minimal insight and personal understanding of group topic.Thought process clear,goal directed and reality based.Goal #1 (no personal safety concerns reported or observed). Goal #2 Not addressed.  Client would benefit from additional opportunities to practice and implement content from this session in her every day life and mental health recovery.    Is this a Weekly Review of the Progress on the Treatment Plan?  Yes.      Are Treatment Plan Goals being addressed?  Yes, continue treatment goals      Are Treatment Plan Strategies to Address Goals Effective?  Yes, continue treatment strategies      Are there any current contracts in place?  No      "

## 2018-05-23 NOTE — PATIENT INSTRUCTIONS
Allergy Staff Appt Hours Shot Hours Locations    Physician     Magdy Blevins DO       Support Staff     Nessa SIERRA, RN      Neeru SIERRA, [facility]  Monday:                      [place] 8-7     Tuesday:         [place] 8-5     Wednesday:        [place]: 7-5     Friday:        [place] 7-5   [place]        Monday: 9-5:50        Wednesday: 2-5:50        Friday: 7-12:50     [place]        Tuesday: 7-10:50        Thursday: 1:30-6:30        Friday: 8:00-3:50     [place] Monday: 7:10-4:50        Tuesday: 12:30-6:30        Thursday: 7-11:50 [facility]  [address] [place] [address]  Appt Line: [phone]  Allergy RN (Monday):  [phone]    [facility]  [address] [place] [address]  Appt Line: [phone]  Allergy RN (Tues & Wed):  [phone]    [facility]  [address] [place] [address]  Appt Line: [phone]  Allergy RN (Friday):  [phone]       Important Scheduling Information  Aspirin Desensitization: Appt will last 2 clinic days. Please call the Allergy RN line for your clinic to schedule. Discontinue antihistamines 7 days prior to the appointment.     Food Challenges: Appt will last 3-4 hours. Please call the Allergy RN line for your clinic to schedule. Discontinue antihistamines 7 days prior to the appointment.     Penicillin Testing: Appt will last 2-3 hours. Please call the Allergy RN line for your clinic to schedule. Discontinue antihistamines 7 days prior to the appointment.     Skin Testing: Appt will about 40 minutes. Call the appointment line for your clinic to schedule. Discontinue antihistamines 7 days prior to the appointment.     Venom Testing: Appt will last 2-3 hours. Please call the Allergy RN line for your clinic to schedule. Discontinue antihistamines 7 days prior to the appointment.     Thank you for trusting us with your Allergy, Asthma, and Immunology care. Please feel free to contact us with any questions or concerns you may  have.      - Zyrtec 10mg by mouth twice daily.   - Return for cluster immunotherapy.   - Refill of EpiPen sent.

## 2018-05-23 NOTE — ASSESSMENT & PLAN NOTE
History of nasal and ocular symptoms for multiple years. Perennial with spring and fall worsening. Tried cetirizine and loratadine which were helpful. Currently used Allegra as needed. Allergy testing done in 2013 positive for dust mite, cat, dog, molds, trees, grasses and weeds. Was tolerating cluster but stopped given psychiatric concerns. She has followed with psychiatrist and now in a place to resume shots and feeling much better from a psychiatric standpoint. Last received yellow 0.25ml in late march of 2018. Cluster immunotherapy today. Tolerated      Skin testing  Positive for cat, dog, dust mites, weeds, trees and molds.       - Flonase 2 spray/nostril daily. Use spring and fall at the very least.   - Allegra or Zyrtec as needed and on allergy shot days.   - Continue allergen immunotherapy.

## 2018-05-23 NOTE — MR AVS SNAPSHOT
After Visit Summary   5/23/2018    Maddy Ortiz    MRN: [id number]           Patient Information     Date Of Birth          1985        Visit Information        Provider Department      5/23/2018 2:00 PM Magdy Blevins DO [facility]        Today's Diagnoses     Chronic seasonal allergic rhinitis due to pollen    -  1    Need for desensitization to allergens        Allergic rhinitis due to mold        Allergic rhinitis due to dust mite        Allergic rhinitis due to animal dander          Care Instructions    Allergy Staff Appt Hours Shot Hours Locations    Physician     Magdy Blevins DO       Support Staff     Nessa SIERRA RN      Neeru SIERRA, [facility]  Monday:                      [place] 8-7     Tuesday:         [place] 8-5     Wednesday:        [place]: 7-5     Friday:        [place] 7-5   [place]        Monday: 9-5:50        Wednesday: 2-5:50        Friday: 7-12:50     [place]        Tuesday: 7-10:50        Thursday: 1:30-6:30        Friday: 8:00-3:50     [place]        Monday: 7:10-4:50        Tuesday: 12:30-6:30        Thursday: 7-11:50 [facility]  [address] [place] [address]  Appt Line: [phone]  Allergy RN (Monday):  [phone]    [facility]  [address] [place] [address]  Appt Line: [phone]  Allergy RN (Tues & Wed):  [phone]    [facility]  [address] [place] [address]  Appt Line: [phone]  Allergy RN (Friday):  [phone]       Important Scheduling Information  Aspirin Desensitization: Appt will last 2 clinic days. Please call the Allergy RN line for your clinic to schedule. Discontinue antihistamines 7 days prior to the appointment.     Food Challenges: Appt will last 3-4 hours. Please call the Allergy RN line for your clinic to schedule. Discontinue antihistamines 7 days prior to the appointment.     Penicillin Testing: Appt will last 2-3 hours. Please call the Allergy RN line for your  clinic to schedule. Discontinue antihistamines 7 days prior to the appointment.     Skin Testing: Appt will about 40 minutes. Call the appointment line for your clinic to schedule. Discontinue antihistamines 7 days prior to the appointment.     Venom Testing: Appt will last 2-3 hours. Please call the Allergy RN line for your clinic to schedule. Discontinue antihistamines 7 days prior to the appointment.     Thank you for trusting us with your Allergy, Asthma, and Immunology care. Please feel free to contact us with any questions or concerns you may have.      - Zyrtec 10mg by mouth twice daily.   - Return for cluster immunotherapy.   - Refill of EpiPen sent.           Follow-ups after your visit        Your next 10 appointments already scheduled     May 24, 2018  1:00 PM CDT   Return Visit with ADULT  DAY 4C   [facility] ([facility])    [address] [place] [address]   [phone]            May 29, 2018  1:00 PM CDT   Return Visit with ADULT  DAY 4C   [facility] ([facility])    [address] [place] [address]   [phone]            May 30, 2018  1:00 PM CDT   [facility]    [address]  [facility] [address]   [phone]            May 31, 2018  1:00 PM CDT   Return Visit with ADULT  DAY 4C   [facility] ([facility])    [address] [place] [address]   [phone]            Jun 04, 2018  1:00 PM CDT   Return Visit with ADULT  DAY 4C   [facility] ([facility])    [address] [place] [address]   [phone]            Jun 05, 2018  1:00 PM CDT   Return Visit with ADULT  DAY  4C   [facility] ([facility])    [address] [address]   [phone]            Jun 06, 2018  1:00 PM CDT   Return Visit with Magdy Blevins,    [facility]    [address]  [facility] [address]   [phone]            Jun 07, 2018  1:00 PM CDT   Return Visit with ADULT MH DAY 4C   [facility] ([facility])    [facility] [address]   [phone]            Jun 11, 2018  1:00 PM CDT   Return Visit with ADULT MH DAY 4C   [facility] ([facility])    [facility] [address] [address]   [phone]            Jun 12, 2018  1:00 PM CDT   Return Visit with ADULT MH DAY 4C   [facility] ([facility])    [facility] [address] [address]   [phone]              Who to contact     If you have questions or need follow up information about today's clinic visit or your schedule please contact [facility] directly at [phone].  Normal or non-critical lab and imaging results will be communicated to you by MyChart, letter or phone within 4 business days after the clinic has received the results. If you do not hear from us within 7 days, please contact the clinic through MyChart or phone. If you have a critical or abnormal lab result, we will notify you by phone as soon as possible.  Submit refill requests through [employer] or call your pharmacy and they will forward the refill request to us. Please allow 3 business days for your refill to be completed.          Additional Information About Your Visit        [employer] Information     [employer] lets you send messages to your doctor, view your test results, renew your  "prescriptions, schedule appointments and more. To sign up, go to [email or web address] . Click on \"Log in\" on the left side of the screen, which will take you to the Welcome page. Then click on \"Sign up Now\" on the right side of the page.     You will be asked to enter the access code listed below, as well as some personal information. Please follow the directions to create your username and password.     Your access code is: [email or web address]  Expires: 2018  3:10 PM     Your access code will  in 90 days. If you need help or a new code, please call your [facility] clinic or [phone].        Care EveryWhere ID     This is your Care EveryWhere ID. This could be used by other organizations to access your [facility] medical records  [id number]        Your Vitals Were     Pulse Temperature Pulse Oximetry BMI (Body Mass Index)          114 97.7  F (36.5  C) (Oral) 97% 33.12 kg/m2         Blood Pressure from Last 3 Encounters:   18 118/82   18 123/85   18 118/76    Weight from Last 3 Encounters:   18 90.3 kg (199 lb)   18 87.8 kg (193 lb 8 oz)   18 88 kg (194 lb)              Today, you had the following     No orders found for display         Today's Medication Changes          These changes are accurate as of 18  3:20 PM.  If you have any questions, ask your nurse or doctor.               These medicines have changed or have updated prescriptions.        Dose/Directions    cetirizine 10 MG tablet   Commonly known as:  zyrTEC   This may have changed:    - when to take this  - reasons to take this   Used for:  Chronic seasonal allergic rhinitis due to pollen   Changed by:  Magdy Blevins,         Dose:  10 mg   Take 1 tablet (10 mg) by mouth 2 times daily   Quantity:  60 tablet   Refills:  11            Where to get your medicines      These medications were sent to [facility] - [place], [place] - [address] [place] [address] " [id number]     Phone:  [phone]     cetirizine 10 MG tablet    EPINEPHrine 0.3 MG/0.3ML injection 2-pack                Primary Care Provider Office Phone # Fax #    [facility] [phone] [address] [facility] [address]        Equal Access to Services     ARMOND MCKEON : Hadii aad ku hadasho Soomaali, waaxda luqadaha, qaybta kaalmada adeegyada, waxay idiin hayaan adeeg khtrishsh laDorisflorencen . So [facility] [phone].    ATENCIÓN: Si habla español, tiene a titus disposición servicios gratuitos de asistencia lingüística. Phoenixtroy al [phone].    We comply with applicable federal civil rights laws and [place] laws. We do not discriminate on the basis of race, color, national origin, age, disability, sex, sexual orientation, or gender identity.            Thank you!     Thank you for choosing [facility]  for your care. Our goal is always to provide you with excellent care. Hearing back from our patients is one way we can continue to improve our services. Please take a few minutes to complete the written survey that you may receive in the mail after your visit with us. Thank you!             Your Updated Medication List - Protect others around you: Learn how to safely use, store and throw away your medicines at www.disposemymeds.org.          This list is accurate as of 5/23/18  3:20 PM.  Always use your most recent med list.                   Brand Name Dispense Instructions for use Diagnosis    * ALLERGEN IMMUNOTHERAPY PRESCRIPTION     5 mL    Cat Hair, Standardized 10,000 BAU/mL, ALK  3.0 ml Dog Hair Dander, A. P.  1:100 w/v, HS  1.0 ml Dust Mites F 30,000AU/mL, HS  0.5 ml Dust Mites P. 30,000 AU/mL, HS  0.5 ml  Diluent: HSA qs to 5ml    Allergic rhinitis due to animal dander, Allergic rhinitis due to dust mite       * ALLERGEN IMMUNOTHERAPY PRESCRIPTION     5 mL    Alternaria Tenuis 1:10 w/v, HS  0.5 ml Epicoccum Nigrum 1:10 w/v, HS 0.5 ml Hormodendrum Cladosporioides  1:10 w/v, HS 0.5 ml Diluent: HSA qs to 5ml    Allergic rhinitis due to mold       * ALLERGEN IMMUNOTHERAPY PRESCRIPTION     5 mL    Estuardo, White  1:20 w/v, HS  0.5 ml Birch Mix PRW 1:20 w/v, HS  0.5 ml Elm, American 1:20 w/v, HS  0.5 ml Hackberry 1:20 w/v, HS 0.5 ml Hickory, Shagbark 1:20 w/v, HS  0.5 ml [place] Mix RW 1:20 w/v, HS 0.5 ml Oak Mix RVW 1:20 w/v, HS 0.5 ml [place] Tree, Black 1:20 w/v, HS 0.5 ml [place], Black 1:20 w/v, HS 0.5 ml Diluent: HSA qs to 5ml    Chronic seasonal allergic rhinitis due to pollen       * ALLERGEN IMMUNOTHERAPY PRESCRIPTION     5 mL    Kochia 1:20 w/v, HS 1.0 ml Nettle 1:20 w/v, HS 1.0 ml Plantain, English 1:20 w/v, HS 1.0 ml Ragweed Mixed 1:20 w/v ALK  0.6 ml Russian Thistle 1:20 w/v, HS 1.0 ml Diluent: HSA qs to 5ml    Chronic seasonal allergic rhinitis due to pollen       cetirizine 10 MG tablet    zyrTEC    60 tablet    Take 1 tablet (10 mg) by mouth 2 times daily    Chronic seasonal allergic rhinitis due to pollen       EPINEPHrine 0.3 MG/0.3ML injection 2-pack    EPIPEN 2-ODETTE    0.6 mL    Inject 0.3 mLs (0.3 mg) into the muscle once as needed for anaphylaxis    Need for desensitization to allergens       haloperidol 0.5 MG tablet    HALDOL     Take 1 mg by mouth 2 times daily 3mg in the morning;  4 mg at night        levonorgestrel 20 MCG/24HR IUD    MIRENA     1 each (20 mcg) by Intrauterine route continuous    Encounter for insertion of mirena IUD       melatonin 3 MG tablet     30 tablet    Take 1 tablet (3 mg) by mouth At Bedtime    Insomnia, unspecified type       traZODone 50 MG tablet    DESYREL    30 tablet    Take 1 tablet (50 mg) by mouth At Bedtime    Insomnia, unspecified type       * Notice:  This list has 4 medication(s) that are the same as other medications prescribed for you. Read the directions carefully, and ask your doctor or other care provider to review them with you.

## 2018-05-23 NOTE — LETTER
5/23/2018         RE: Maddy Ortiz  [address]  [facility] [address]        Dear Colleague,    Thank you for referring your patient, Maddy Ortiz, to the [facility]. Please see a copy of my visit note below.      Maddy Ortiz is a 32 year old  female with previous medical history significant for allergic rhinitis who returns for a follow up visit. Maddy Ortiz is being seen today for asthma and seasonal allergies.       Patient presents today for cluster immunotherapy. The patient is currently in a good state of health. No recent fevers, chills, cough, wheezing, shortness of breath, skin rash, angioedema, nausea, vomiting or diarrhea. The risks and benefits were discussed and the patient/patient's family wishes to proceed. The consent was signed.    Past Medical History:   Diagnosis Date     Bipolar 1 disorder (H) 12/6/2012     Depressive disorder      Paranoid type delusional disorder (H) 11/14/2012     Family History   Problem Relation Age of Onset     Adopted: Yes     Unknown/Adopted Mother      Unknown/Adopted Father      Unknown/Adopted Other      Past Surgical History:   Procedure Laterality Date     TONSILLECTOMY & ADENOIDECTOMY      as a child       REVIEW OF SYSTEMS:  General: positive  for weight gain. negative for weight loss. negative for changes in sleep.   Ears: negative for fullness. negative for hearing loss. negative for dizziness.   Nose: negative for snoring.negative for changes in smell. negative for drainage.   Throat: negative for hoarseness. negative for sore throat. negative for trouble swallowing.   Lungs: negative for shortness of breath.negative for wheezing. negative for sputum production.   Cardiovascular: negative for chest pain. negative for swelling of ankles. negative for fast or irregular heartbeat.   Gastrointestinal: negative for nausea. negative for heartburn. negative for acid reflux.   Musculoskeletal: negative for joint pain.  negative for joint stiffness. negative for joint swelling.   Neurologic: negative for seizures. negative for fainting. negative for weakness.   Psychiatric: negative for changes in mood. negative for anxiety.   Endocrine: negative for cold intolerance. negative for heat intolerance. negative for tremors.   Hematologic: negative for easy bruising. negative for easy bleeding.  Integumentary: negative for rash. negative for scaling. negative for nail changes.       Current Outpatient Prescriptions:      cetirizine (ZYRTEC) 10 MG tablet, Take 1 tablet (10 mg) by mouth 2 times daily, Disp: 60 tablet, Rfl: 11     EPINEPHrine (EPIPEN 2-ODETTE) 0.3 MG/0.3ML injection 2-pack, Inject 0.3 mLs (0.3 mg) into the muscle once as needed for anaphylaxis, Disp: 0.6 mL, Rfl: 3     haloperidol (HALDOL) 0.5 MG tablet, Take 1 mg by mouth 2 times daily 3mg in the morning;  4 mg at night, Disp: , Rfl:      levonorgestrel (MIRENA) 20 MCG/24HR IUD, 1 each (20 mcg) by Intrauterine route continuous, Disp: , Rfl:      melatonin 3 MG tablet, Take 1 tablet (3 mg) by mouth At Bedtime, Disp: 30 tablet, Rfl: 0     ORDER FOR ALLERGEN IMMUNOTHERAPY, Cat Hair, Standardized 10,000 BAU/mL, ALK  3.0 ml Dog Hair Dander, A. P.  1:100 w/v, HS  1.0 ml Dust Mites F 30,000AU/mL, HS  0.5 ml Dust Mites P. 30,000 AU/mL, HS  0.5 ml  Diluent: HSA qs to 5ml, Disp: 5 mL, Rfl: PRN     ORDER FOR ALLERGEN IMMUNOTHERAPY, Alternaria Tenuis 1:10 w/v, HS  0.5 ml Epicoccum Nigrum 1:10 w/v, HS 0.5 ml Hormodendrum Cladosporioides 1:10 w/v, HS 0.5 ml Diluent: HSA qs to 5ml, Disp: 5 mL, Rfl: PRN     ORDER FOR ALLERGEN IMMUNOTHERAPY, Estuardo, White  1:20 w/v, HS  0.5 ml Birch Mix PRW 1:20 w/v, HS  0.5 ml Elm, American 1:20 w/v, HS  0.5 ml Hackberry 1:20 w/v, HS 0.5 ml Hickory, Shagbark 1:20 w/v, HS  0.5 ml [place] Mix RW 1:20 w/v, HS 0.5 ml Oak Mix RVW 1:20 w/v, HS 0.5 ml [place] Tree, Black 1:20 w/v, HS 0.5 ml [place], Black 1:20 w/v, HS 0.5 ml Diluent: HSA qs to 5ml, Disp: 5 mL, Rfl:  PRN     ORDER FOR ALLERGEN IMMUNOTHERAPY, Kochia 1:20 w/v, HS 1.0 ml Nettle 1:20 w/v, HS 1.0 ml Plantain, English 1:20 w/v, HS 1.0 ml Ragweed Mixed 1:20 w/v ALK  0.6 ml Russian Thistle 1:20 w/v, HS 1.0 ml Diluent: HSA qs to 5ml, Disp: 5 mL, Rfl: PRN     traZODone (DESYREL) 50 MG tablet, Take 1 tablet (50 mg) by mouth At Bedtime, Disp: 30 tablet, Rfl: 3  Immunization History   Administered Date(s) Administered     Influenza (IIV3) PF 12/06/2012     Influenza Vaccine IM 3yrs+ 4 Valent IIV4 11/21/2017     TDAP Vaccine (Adacel) 12/06/2012     Allergies   Allergen Reactions     Animal Dander      Nkda [No Known Drug Allergies]      Seasonal Allergies      Weeds and mold         EXAM:   Constitutional:  Appears well-developed and well-nourished. No distress.   HEENT:   Head: Normocephalic.   Mouth/Throat:  No cobblestoning of posterior oropharynx.   Nasal tissue pink and normal appearing.  No rhinorrhea noted.    Eyes: Conjunctivae are non-erythematous   Cardiovascular: Normal rate, regular rhythm and normal heart sounds. Exam reveals no gallop and no friction rub.   No murmur heard.  Respiratory: Effort normal and breath sounds normal. No respiratory distress. No wheezes. No rales.   Musculoskeletal: Normal range of motion.    Neuro: Oriented to person, place, and time.  Skin: Skin is warm and dry. No rash noted.   Psychiatric: Normal mood and affect.     Nursing note and vitals reviewed.      WORKUP: Cluster immunotherapy   The patient received blue 0.2, blue 0.4, and yellow 0.05. Each dose was  by 30 minutes. Full report will be scanned into electronic medical record. The patient was in office for over 1.5 hours.    ASSESSMENT/PLAN:  Problem List Items Addressed This Visit        Respiratory    Seasonal allergic rhinitis due to pollen - Primary     History of nasal and ocular symptoms for multiple years. Perennial with spring and fall worsening. Tried cetirizine and loratadine which were helpful. Currently  used Allegra as needed. Allergy testing done in 2013 positive for dust mite, cat, dog, molds, trees, grasses and weeds. Was tolerating cluster but stopped given psychiatric concerns. She has followed with psychiatrist and now in a place to resume shots and feeling much better from a psychiatric standpoint. Last received yellow 0.25ml in late march of 2018. Cluster immunotherapy today. Tolerated      Skin testing  Positive for cat, dog, dust mites, weeds, trees and molds.       - Flonase 2 spray/nostril daily. Use spring and fall at the very least.   - Allegra or Zyrtec as needed and on allergy shot days.   - Continue allergen immunotherapy.          Relevant Medications    cetirizine (ZYRTEC) 10 MG tablet    Other Relevant Orders    RAPID DESENSITIZATION    Allergic rhinitis due to mold    Relevant Medications    cetirizine (ZYRTEC) 10 MG tablet    Other Relevant Orders    RAPID DESENSITIZATION    Allergic rhinitis due to animal dander    Relevant Medications    cetirizine (ZYRTEC) 10 MG tablet    Other Relevant Orders    RAPID DESENSITIZATION    Allergic rhinitis due to dust mite    Relevant Medications    cetirizine (ZYRTEC) 10 MG tablet    Other Relevant Orders    RAPID DESENSITIZATION      Other Visit Diagnoses     Need for desensitization to allergens        Relevant Medications    EPINEPHrine (EPIPEN 2-ODETTE) 0.3 MG/0.3ML injection 2-pack          Chart documentation with Dragon Voice recognition Software. Although reviewed after completion, some words and grammatical errors may remain.    Magdy Blevins,    Allergy/Immunology  [facility] [place]      Again, thank you for allowing me to participate in the care of your patient.        Sincerely,        Magdy Blevins, DO

## 2018-05-23 NOTE — PROGRESS NOTES
Maddy Oritz is a 32 year old  female with previous medical history significant for allergic rhinitis who returns for a follow up visit. Maddy Ortiz is being seen today for asthma and seasonal allergies.       Patient presents today for cluster immunotherapy. The patient is currently in a good state of health. No recent fevers, chills, cough, wheezing, shortness of breath, skin rash, angioedema, nausea, vomiting or diarrhea. The risks and benefits were discussed and the patient/patient's family wishes to proceed. The consent was signed.    Past Medical History:   Diagnosis Date     Bipolar 1 disorder (H) 12/6/2012     Depressive disorder      Paranoid type delusional disorder (H) 11/14/2012     Family History   Problem Relation Age of Onset     Adopted: Yes     Unknown/Adopted Mother      Unknown/Adopted Father      Unknown/Adopted Other      Past Surgical History:   Procedure Laterality Date     TONSILLECTOMY & ADENOIDECTOMY      as a child       REVIEW OF SYSTEMS:  General: positive  for weight gain. negative for weight loss. negative for changes in sleep.   Ears: negative for fullness. negative for hearing loss. negative for dizziness.   Nose: negative for snoring.negative for changes in smell. negative for drainage.   Throat: negative for hoarseness. negative for sore throat. negative for trouble swallowing.   Lungs: negative for shortness of breath.negative for wheezing. negative for sputum production.   Cardiovascular: negative for chest pain. negative for swelling of ankles. negative for fast or irregular heartbeat.   Gastrointestinal: negative for nausea. negative for heartburn. negative for acid reflux.   Musculoskeletal: negative for joint pain. negative for joint stiffness. negative for joint swelling.   Neurologic: negative for seizures. negative for fainting. negative for weakness.   Psychiatric: negative for changes in mood. negative for anxiety.   Endocrine: negative for cold intolerance.  negative for heat intolerance. negative for tremors.   Hematologic: negative for easy bruising. negative for easy bleeding.  Integumentary: negative for rash. negative for scaling. negative for nail changes.       Current Outpatient Prescriptions:      cetirizine (ZYRTEC) 10 MG tablet, Take 1 tablet (10 mg) by mouth 2 times daily, Disp: 60 tablet, Rfl: 11     EPINEPHrine (EPIPEN 2-ODETTE) 0.3 MG/0.3ML injection 2-pack, Inject 0.3 mLs (0.3 mg) into the muscle once as needed for anaphylaxis, Disp: 0.6 mL, Rfl: 3     haloperidol (HALDOL) 0.5 MG tablet, Take 1 mg by mouth 2 times daily 3mg in the morning;  4 mg at night, Disp: , Rfl:      levonorgestrel (MIRENA) 20 MCG/24HR IUD, 1 each (20 mcg) by Intrauterine route continuous, Disp: , Rfl:      melatonin 3 MG tablet, Take 1 tablet (3 mg) by mouth At Bedtime, Disp: 30 tablet, Rfl: 0     ORDER FOR ALLERGEN IMMUNOTHERAPY, Cat Hair, Standardized 10,000 BAU/mL, ALK  3.0 ml Dog Hair Dander, A. P.  1:100 w/v, HS  1.0 ml Dust Mites F 30,000AU/mL, HS  0.5 ml Dust Mites P. 30,000 AU/mL, HS  0.5 ml  Diluent: HSA qs to 5ml, Disp: 5 mL, Rfl: PRN     ORDER FOR ALLERGEN IMMUNOTHERAPY, Alternaria Tenuis 1:10 w/v, HS  0.5 ml Epicoccum Nigrum 1:10 w/v, HS 0.5 ml Hormodendrum Cladosporioides 1:10 w/v, HS 0.5 ml Diluent: HSA qs to 5ml, Disp: 5 mL, Rfl: PRN     ORDER FOR ALLERGEN IMMUNOTHERAPY, Estuardo, White  1:20 w/v, HS  0.5 ml Birch Mix PRW 1:20 w/v, HS  0.5 ml Elm, American 1:20 w/v, HS  0.5 ml Hackberry 1:20 w/v, HS 0.5 ml Hickory, Shagbark 1:20 w/v, HS  0.5 ml [place] Mix RW 1:20 w/v, HS 0.5 ml Oak Mix RVW 1:20 w/v, HS 0.5 ml [place] Tree, Black 1:20 w/v, HS 0.5 ml [place], Black 1:20 w/v, HS 0.5 ml Diluent: HSA qs to 5ml, Disp: 5 mL, Rfl: PRN     ORDER FOR ALLERGEN IMMUNOTHERAPY, Kochia 1:20 w/v, HS 1.0 ml Nettle 1:20 w/v, HS 1.0 ml Plantain, English 1:20 w/v, HS 1.0 ml Ragweed Mixed 1:20 w/v ALK  0.6 ml Russian Thistle 1:20 w/v, HS 1.0 ml Diluent: HSA qs to 5ml, Disp: 5 mL, Rfl: PRN      traZODone (DESYREL) 50 MG tablet, Take 1 tablet (50 mg) by mouth At Bedtime, Disp: 30 tablet, Rfl: 3  Immunization History   Administered Date(s) Administered     Influenza (IIV3) PF 12/06/2012     Influenza Vaccine IM 3yrs+ 4 Valent IIV4 11/21/2017     TDAP Vaccine (Adacel) 12/06/2012     Allergies   Allergen Reactions     Animal Dander      Nkda [No Known Drug Allergies]      Seasonal Allergies      Weeds and mold         EXAM:   Constitutional:  Appears well-developed and well-nourished. No distress.   HEENT:   Head: Normocephalic.   Mouth/Throat:  No cobblestoning of posterior oropharynx.   Nasal tissue pink and normal appearing.  No rhinorrhea noted.    Eyes: Conjunctivae are non-erythematous   Cardiovascular: Normal rate, regular rhythm and normal heart sounds. Exam reveals no gallop and no friction rub.   No murmur heard.  Respiratory: Effort normal and breath sounds normal. No respiratory distress. No wheezes. No rales.   Musculoskeletal: Normal range of motion.    Neuro: Oriented to person, place, and time.  Skin: Skin is warm and dry. No rash noted.   Psychiatric: Normal mood and affect.     Nursing note and vitals reviewed.      WORKUP: Cluster immunotherapy   The patient received blue 0.2, blue 0.4, and yellow 0.05. Each dose was  by 30 minutes. Full report will be scanned into electronic medical record. The patient was in office for over 1.5 hours.    ASSESSMENT/PLAN:  Problem List Items Addressed This Visit        Respiratory    Seasonal allergic rhinitis due to pollen - Primary     History of nasal and ocular symptoms for multiple years. Perennial with spring and fall worsening. Tried cetirizine and loratadine which were helpful. Currently used Allegra as needed. Allergy testing done in 2013 positive for dust mite, cat, dog, molds, trees, grasses and weeds. Was tolerating cluster but stopped given psychiatric concerns. She has followed with psychiatrist and now in a place to resume shots and  feeling much better from a psychiatric standpoint. Last received yellow 0.25ml in late march of 2018. Cluster immunotherapy today. Tolerated      Skin testing  Positive for cat, dog, dust mites, weeds, trees and molds.       - Flonase 2 spray/nostril daily. Use spring and fall at the very least.   - Allegra or Zyrtec as needed and on allergy shot days.   - Continue allergen immunotherapy.          Relevant Medications    cetirizine (ZYRTEC) 10 MG tablet    Other Relevant Orders    RAPID DESENSITIZATION    Allergic rhinitis due to mold    Relevant Medications    cetirizine (ZYRTEC) 10 MG tablet    Other Relevant Orders    RAPID DESENSITIZATION    Allergic rhinitis due to animal dander    Relevant Medications    cetirizine (ZYRTEC) 10 MG tablet    Other Relevant Orders    RAPID DESENSITIZATION    Allergic rhinitis due to dust mite    Relevant Medications    cetirizine (ZYRTEC) 10 MG tablet    Other Relevant Orders    RAPID DESENSITIZATION      Other Visit Diagnoses     Need for desensitization to allergens        Relevant Medications    EPINEPHrine (EPIPEN 2-ODETTE) 0.3 MG/0.3ML injection 2-pack          Chart documentation with Dragon Voice recognition Software. Although reviewed after completion, some words and grammatical errors may remain.    Magdy Blevins,    Allergy/Immunology  [facility], [place]

## 2018-05-24 ENCOUNTER — HOSPITAL ENCOUNTER (EMERGENCY)
Facility: CLINIC | Age: 33
Discharge: HOME | End: 2018-05-24
Attending: EMERGENCY MEDICINE | Admitting: EMERGENCY MEDICINE
Payer: COMMERCIAL

## 2018-05-24 ENCOUNTER — HOSPITAL ENCOUNTER (OUTPATIENT)
Dept: BEHAVIORAL HEALTH | Facility: CLINIC | Age: 33
End: 2018-05-24
Attending: PSYCHIATRY & NEUROLOGY
Payer: COMMERCIAL

## 2018-05-24 ENCOUNTER — PATIENT OUTREACH (OUTPATIENT)
Dept: CARE COORDINATION | Facility: CLINIC | Age: 33
End: 2018-05-24

## 2018-05-24 VITALS
TEMPERATURE: 97.7 F | OXYGEN SATURATION: 97 % | BODY MASS INDEX: 33.12 KG/M2 | RESPIRATION RATE: 18 BRPM | DIASTOLIC BLOOD PRESSURE: 92 MMHG | SYSTOLIC BLOOD PRESSURE: 136 MMHG | WEIGHT: 199 LBS

## 2018-05-24 DIAGNOSIS — K21.9 GASTROESOPHAGEAL REFLUX DISEASE WITHOUT ESOPHAGITIS: ICD-10-CM

## 2018-05-24 PROCEDURE — H2012 BEHAV HLTH DAY TREAT, PER HR: HCPCS

## 2018-05-24 PROCEDURE — 99283 EMERGENCY DEPT VISIT LOW MDM: CPT | Performed by: EMERGENCY MEDICINE

## 2018-05-24 PROCEDURE — 25000125 ZZHC RX 250: Performed by: EMERGENCY MEDICINE

## 2018-05-24 PROCEDURE — 99284 EMERGENCY DEPT VISIT MOD MDM: CPT | Mod: Z6 | Performed by: EMERGENCY MEDICINE

## 2018-05-24 PROCEDURE — 25000132 ZZH RX MED GY IP 250 OP 250 PS 637: Performed by: EMERGENCY MEDICINE

## 2018-05-24 RX ADMIN — LIDOCAINE HYDROCHLORIDE 30 ML: 20 SOLUTION ORAL; TOPICAL at 03:29

## 2018-05-24 ASSESSMENT — ENCOUNTER SYMPTOMS
ABDOMINAL PAIN: 0
FATIGUE: 0
BACK PAIN: 0
COUGH: 0
FEVER: 0
CHILLS: 0
HEADACHES: 0
CHEST TIGHTNESS: 0
SHORTNESS OF BREATH: 0

## 2018-05-24 ASSESSMENT — PAIN DESCRIPTION - DESCRIPTORS: DESCRIPTORS: BURNING

## 2018-05-24 NOTE — LETTER
Health Care Home - Access Care Plan  About Me  Patient Name:  Maddy Ortiz  YOB: 1985  Age:                             32 year old   [facility] MRN:            [id number] Telephone Information:     Home Phone [phone]   Mobile [phone]       Address:    [address] [place] [address] Email address:  No e-mail address on record      Emergency Contact(s)  Name Relationship Lgl Grd Work Phone Home Phone Mobile Phone   1. RAMY ORTIZ * Mother  [phone]   2. KASHIF DIAZ Friend  none [phone]      Health Maintenance: Routine Health maintenance Reviewed: Up to date  My Access Plan  Medical Emergency 911   Questions or concerns during clinic hours Primary Clinic Line, I will call the clinic directly: [facility] - [phone]   24 Hour Appointment Line [phone] or  [phone]) (toll free)   24 Hour Nurse Line [phone] (toll free)   Questions or concerns outside clinic hours 24 Hour Appointment Line, I will call the after-hours on-call line:   [facility] [phone] or [phone] ([phone]) (toll-free)   Preferred Urgent Care [facility], [phone]   Preferred Hospital [place], [place]  [phone]   Preferred Pharmacy [facility] [place], [place] - [address]     Behavioral Health Crisis Line The National Suicide Prevention Lifeline at 1-543.472.1832 or 911                     My Care Team Members  Patient Care Team       Relationship Specialty Notifications Start End    Shirley Freeman APRN CNP PCP - General Nurse Practitioner  5/24/18     Phone: [phone] Fax: [phone] [address] [facility] [address]    Cayla Winchester Psychologist Psychology  11/21/13     Comment:  [employer] [facility]- TIMOTHY signed 11/20/13-   Email- [email or web address]    Phone: [phone]  Fax: [phone]         [employer] [address] [facility] [address]    Callie Welsh ARM worker   11/21/13     Temi PONCE    11/21/13     Comment:  [facility]  (TIMOTHY signed 11/20/13)    Phone: [phone]         Wallace Calabrese MD MD Psychiatry  11/21/13     Comment:  [facility] [place] (TIMOTHY signed 11/20/13)    Phone: [phone] Fax: [phone]         [facility] [address] [facility] [address]    Deni Fernandez MD MD Family Practice  5/4/18     Phone: [phone] Fax: [phone] [address] [facility] [address]    Aggie Lehman MD MD INTERNAL MEDICINE - ENDOCRINOLOGY, DIABETES & METABOLISM  5/4/18     Phone: [phone] Fax: [phone]         [address] [place] [address] [address] [facility] [address]           My Medical and Care Information  Problem List   Patient Active Problem List   Diagnosis     Animal dander allergy     CARDIOVASCULAR SCREENING; LDL GOAL LESS THAN 160     Psychosis     Paranoid type delusional disorder (H)     Bipolar 1 disorder (H)     Insomnia     Depression with anxiety     Seasonal allergic rhinitis due to pollen     Allergic rhinitis due to mold     Allergic rhinitis due to animal dander     Allergic rhinitis due to dust mite     Encounter for IUD insertion     Schizoaffective disorder, bipolar type (H)      Current Medications and Allergies:  See printed Medication Report

## 2018-05-24 NOTE — ED PROVIDER NOTES
History     Chief Complaint   Patient presents with     Heartburn     HPI  Maddy Ortiz is a 32 year old female with history of schizoaffective disorder, bipolar, insomnia, and depression presenting for evaluation of acid reflux.  Patient reports frequent episodes of burning discomfort in her epigastrium radiating to her chest.  Symptoms are worse at night when she lays down and improve when sitting upright.  Also reports an increased appetite and weight gain recently.  Denies any trouble breathing or chest pain.  Denies any sweating or nausea.  Has not taken anything for heartburn and she reports she has no money and cannot afford over-the-counter medication.  Denies fever chills.  Denies any urinary or bowel symptoms.    Problem List:    Patient Active Problem List    Diagnosis Date Noted     Schizoaffective disorder, bipolar type (H) 05/07/2018     Priority: Medium     Encounter for IUD insertion 09/15/2017     Priority: Medium     inserted 9/15/17 by Krista Keller MD    LOT: [id number]  Exp: 04/20       Seasonal allergic rhinitis due to pollen 11/04/2016     Priority: Medium     Allergic rhinitis due to mold 11/04/2016     Priority: Medium     Allergic rhinitis due to animal dander 11/04/2016     Priority: Medium     Allergic rhinitis due to dust mite 11/04/2016     Priority: Medium     Depression with anxiety 01/26/2015     Priority: Medium     Health Care Home 11/21/2013     Priority: Medium     State Tier Level:    Status:  Closed 2/12/14  Care Coordinator:  Kat Tripp               Insomnia 07/18/2013     Priority: Medium     Bipolar 1 disorder (H) 12/06/2012     Priority: Medium     Planning on seeing Purvi (psychiatric nurse)       Paranoid type delusional disorder (H) 11/14/2012     Priority: Medium     Psychosis 10/16/2012     Priority: Medium     Animal dander allergy 05/09/2012     Priority: Medium     Dog and Cat       CARDIOVASCULAR SCREENING; LDL GOAL LESS THAN 160 05/09/2012     Priority:  Medium        Past Medical History:    Past Medical History:   Diagnosis Date     Bipolar 1 disorder (H) 12/6/2012     Depressive disorder      Paranoid type delusional disorder (H) 11/14/2012       Past Surgical History:    Past Surgical History:   Procedure Laterality Date     TONSILLECTOMY & ADENOIDECTOMY      as a child       Family History:    Family History   Problem Relation Age of Onset     Adopted: Yes     Unknown/Adopted Mother      Unknown/Adopted Father      Unknown/Adopted Other        Social History:  Marital Status:  Single [1]  Social History   Substance Use Topics     Smoking status: Never Smoker     Smokeless tobacco: Never Used     Alcohol use Yes      Comment: rare wine ( very rare)        Medications:      ranitidine (ZANTAC) 150 MG tablet   cetirizine (ZYRTEC) 10 MG tablet   EPINEPHrine (EPIPEN 2-ODETTE) 0.3 MG/0.3ML injection 2-pack   haloperidol (HALDOL) 0.5 MG tablet   levonorgestrel (MIRENA) 20 MCG/24HR IUD   melatonin 3 MG tablet   ORDER FOR ALLERGEN IMMUNOTHERAPY   ORDER FOR ALLERGEN IMMUNOTHERAPY   ORDER FOR ALLERGEN IMMUNOTHERAPY   ORDER FOR ALLERGEN IMMUNOTHERAPY   traZODone (DESYREL) 50 MG tablet         Review of Systems   Constitutional: Negative for chills, fatigue and fever.   HENT: Negative for congestion.    Respiratory: Negative for cough, chest tightness and shortness of breath.    Cardiovascular: Negative for chest pain.   Gastrointestinal: Negative for abdominal pain.   Musculoskeletal: Negative for back pain.   Skin: Negative for rash.   Neurological: Negative for headaches.   All other systems reviewed and are negative.      Physical Exam   BP: (!) 136/92  Heart Rate: 90  Temp: 97.7  F (36.5  C)  Resp: 18  Weight: 90.3 kg (199 lb)  SpO2: 97 %      Physical Exam   Constitutional: She is oriented to person, place, and time. She appears well-developed and well-nourished. No distress.   HENT:   Head: Normocephalic and atraumatic.   Mouth/Throat: Oropharynx is clear and moist.    Eyes: Conjunctivae are normal.   Cardiovascular: Normal rate.    Pulmonary/Chest: Effort normal and breath sounds normal.   Abdominal: Soft. There is no tenderness.   Musculoskeletal: Normal range of motion.   Neurological: She is alert and oriented to person, place, and time.   Skin: Skin is warm and dry. She is not diaphoretic.   Psychiatric: She has a normal mood and affect.   Nursing note and vitals reviewed.      ED Course     ED Course     Procedures                   No results found for this or any previous visit (from the past 24 hour(s)).    Medications   lidocaine (viscous) (XYLOCAINE) 2 % 15 mL, alum & mag hydroxide-simethicone (MYLANTA ES/MAALOX  ES) 15 mL GI Cocktail (30 mLs Oral Given 5/24/18 1802)       Assessments & Plan (with Medical Decision Making)  32-year-old female with history of schizoaffective disorder with bipolar, paranoia, and depression presenting for evaluation of recurrent episodes of burning discomfort in her epigastrium radiating to her chest.  Symptoms worse at night and when laying down and somewhat improved sitting upright but can happen all day long.  Denies associated diaphoresis, nausea, or dyspnea.  Symptoms are nonexertional.  Has not taken any medications because she reports she has been wanting to take an over-the-counter antacid.  Well-appearing in the emergency department in no distress with no concerning underlying features suggest a dangerous underlying cause of her symptoms.  Treated with a GI cocktail given a prescription for ranitidine and recognition of follow-up with primary care in about 1 week to reassess.     I have reviewed the nursing notes.    I have reviewed the findings, diagnosis, plan and need for follow up with the patient.       Discharge Medication List as of 5/24/2018  3:31 AM      START taking these medications    Details   ranitidine (ZANTAC) 150 MG tablet Take 1 tablet (150 mg) by mouth 2 times daily, Disp-60 tablet, R-0, E-Prescribe              Final diagnoses:   Gastroesophageal reflux disease without esophagitis       5/24/2018   [facility] EMERGENCY DEPARTMENT     Olmos, Júnior Elder MD  05/24/18 6083

## 2018-05-24 NOTE — ED AVS SNAPSHOT
[facility] Emergency Department    [address] [address]    Phone:  [phone]    Fax:  [phone]                                       Maddy Ortiz   MRN: [id number]    Department:  [facility] Emergency Department   Date of Visit:  5/24/2018           After Visit Summary Signature Page     I have received my discharge instructions, and my questions have been answered. I have discussed any challenges I see with this plan with the nurse or doctor.    ..........................................................................................................................................  Patient/Patient Representative Signature      ..........................................................................................................................................  Patient Representative Print Name and Relationship to Patient    ..................................................               ................................................  Date                                            Time    ..........................................................................................................................................  Reviewed by Signature/Title    ...................................................              ..............................................  Date                                                            Time

## 2018-05-24 NOTE — ED AVS SNAPSHOT
[facility] Emergency Department    [address] [facility] [address]    Phone:  [phone]    Fax:  [phone]                                       Maddy Ortiz   MRN: [id number]    Department:  [facility] Emergency Department   Date of Visit:  5/24/2018           Patient Information     Date Of Birth          1985        Your diagnoses for this visit were:     Gastroesophageal reflux disease without esophagitis        You were seen by Júnior Olmos MD.      Follow-up Information     Follow up with Shirley Freeman APRN CNP. Schedule an appointment as soon as possible for a visit in 1 week.    Specialty:  Nurse Practitioner    Why:  For follow up    Contact information:    [address] [facility] [address] [id number] [phone]          Discharge Instructions         Medicines for Acid Reflux  Your healthcare provider has told you that you have acid reflux. This condition causes stomach acid to wash up into your throat. For most people, acid reflux is troubling but not dangerous. But left untreated, acid reflux sometimes damages the esophagus. Medicines can help control acid reflux and limit your risk of future problems.  Medicines for acid reflux  Your healthcare provider may prescribe medicine to help treat your acid reflux. Medicine will be based on your symptoms and any test results. Your provider will explain how to take your medicine. You will also be told about possible side effects.  Reducing stomach acid  Your provider may suggest antacids that you can buy over the counter. Antacids can give fast relief. Or you may be told to take a type of medicine called H2 blockers. These are available over the counter and by prescription (for higher doses).  Blocking stomach acid  In more severe cases, your healthcare provider may suggest stronger medicines such as proton pump inhibitors (PPIs). These keep the stomach from making acid. They are often prescribed for  long-term use.  Other medicines  In some cases medicines to reduce or block stomach acid may not work. Then you may be switched to another type of medicine that helps your stomach empty better.     Date Last Reviewed: 10/1/2016    4531-3189 [employer]. [address] [place] [address], [place] [address]. All rights reserved. This information is not intended as a substitute for professional medical care. Always follow your healthcare professional's instructions.          Discharge Instructions for Gastroesophageal Reflux Disease (GERD)  Gastroesophageal reflux disease (GERD) is a backflow of acid from the stomach into the swallowing tube (esophagus).  Home care  These home care steps can help you manage GERD:    Maintain a healthy weight. Get help to lose any extra pounds.    Avoid lying down after meals.    Avoid eating late at night.    Elevate the head of your bed by 6 inches. You can do this by placing wooden blocks or bed risers under the head of your bed.    Avoid wearing tight-fitting clothes.    Avoid foods that might irritate your stomach, such as the following:  ? Alcohol  ? Fat  ? Chocolate  ? Caffeine  ? Spearmint or peppermint    Talk to your healthcare provider if you are taking any of the following medicines. These medicines can make GERD symptoms worse:  ? Calcium channel blockers  ? Theophylline  ? Anticholinergic medicines, such as oxybutynin and benzatropine    Begin an exercise program. Ask your healthcare provider how to get started. You can benefit from simple activities, such as walking or gardening.    Break the smoking habit. Enroll in a stop-smoking program to improve your chances of success.    Limit alcohol intake to no more than 2 drinks a day.    Take your medicines exactly as directed. Don t skip doses.    Avoid over-the-counter nonsteroidal anti-inflammatory medicines, such as aspirin and ibuprofen, unless recommended by your healthcare provider for certain conditions.     If  possible, avoid nitrates (heart medicines, such as nitroglycerin and isosorbide dinitrate ).  Follow-up care  Make a follow-up appointment as directed by our staff.     When to call the healthcare provider  Call your healthcare provider immediately if you have any of the following:    Trouble swallowing    Pain when swallowing    Feeling of food caught in your chest or throat    Pain in the neck, chest, or back    Heartburn that causes you to vomit    Vomiting blood    Black or tarry stools (from digested blood)    More saliva (watering of the mouth) than usual    Weight loss of more than 3% to 5% of your total body weight in a month    Hoarseness or sore throat that won t go away    Choking, coughing, or wheezing   Date Last Reviewed: 7/1/2016 2000-2017 [employer]. [address] [place] [address]. All rights reserved. This information is not intended as a substitute for professional medical care. Always follow your healthcare professional's instructions.          Your next 10 appointments already scheduled     May 24, 2018  6:40 AM CDT   SHORT with Deni Fernandez MD   [facility]    [address] [facility] [address]   [phone]            May 24, 2018  1:00 PM CDT   Return Visit with ADULT  DAY 4C   [facility] ([facility])    [address] [address]   [phone]            May 29, 2018  1:00 PM CDT   Return Visit with ADULT  DAY 4C   [facility] ([facility])    [address] [address]   [phone]            May 30, 2018  1:00 PM CDT   Return Visit with Magdy Blevins DO   [facility]    [address]  [facility] [address] [phone]            May 31, 2018  1:00 PM CDT    Return Visit with ADULT  DAY 4C   [facility] ([facility])    [address] [address]   [phone]            Jun 04, 2018  1:00 PM CDT   Return Visit with ADULT  DAY 4C   [facility] ([facility])    [facility] [address] [address]   [phone]            Jun 05, 2018  1:00 PM CDT   Return Visit with ADULT  DAY 4C   [facility] ([facility])    [facility] [address] [address]   [phone]            Jun 06, 2018  1:00 PM CDT   Return Visit with Magdy Blevins,    [facility] ([facility])    [address]  [facility] [address]   [phone]            Jun 07, 2018  1:00 PM CDT   Return Visit with ADULT  DAY 4C   [facility] ([facility])    [facility] [address] [address]   [phone]            Jun 11, 2018  1:00 PM CDT   Return Visit with ADULT  DAY 4C   [facility] ([facility])    [address] [place] [address]   [phone]              24 Hour Appointment Hotline       To make an appointment at any [facility], call [phone] ([phone]). If you don't have a family doctor or clinic, we will help you find one. [facility] are conveniently located to serve the needs of you and your family.             Review of your medicines      START taking        Dose / Directions Last dose taken    ranitidine 150 MG tablet   Commonly known as:  ZANTAC   Dose:  150 mg   Quantity:  60 tablet        Take 1 tablet (150 mg) by mouth 2 times daily   Refills:  0          Our records show that you are taking the medicines listed below. If  these are incorrect, please call your family doctor or clinic.        Dose / Directions Last dose taken    * ALLERGEN IMMUNOTHERAPY PRESCRIPTION   Quantity:  5 mL        Cat Hair, Standardized 10,000 BAU/mL, ALK  3.0 ml Dog Hair Dander, A. P.  1:100 w/v, HS  1.0 ml Dust Mites F 30,000AU/mL, HS  0.5 ml Dust Mites P. 30,000 AU/mL, HS  0.5 ml  Diluent: HSA qs to 5ml   Refills:  PRN        * ALLERGEN IMMUNOTHERAPY PRESCRIPTION   Quantity:  5 mL        Alternaria Tenuis 1:10 w/v, HS  0.5 ml Epicoccum Nigrum 1:10 w/v, HS 0.5 ml Hormodendrum Cladosporioides 1:10 w/v, HS 0.5 ml Diluent: HSA qs to 5ml   Refills:  PRN        * ALLERGEN IMMUNOTHERAPY PRESCRIPTION   Quantity:  5 mL        Estuardo, White  1:20 w/v, HS  0.5 ml Birch Mix PRW 1:20 w/v, HS  0.5 ml Elm, American 1:20 w/v, HS  0.5 ml Hackberry 1:20 w/v, HS 0.5 ml Hickory, Shagbark 1:20 w/v, HS  0.5 ml [place] Mix RW 1:20 w/v, HS 0.5 ml Oak Mix RVW 1:20 w/v, HS 0.5 ml Colbert Tree, Black 1:20 w/v, HS 0.5 ml [place], Black 1:20 w/v, HS 0.5 ml Diluent: HSA qs to 5ml   Refills:  PRN        * ALLERGEN IMMUNOTHERAPY PRESCRIPTION   Quantity:  5 mL        Kochia 1:20 w/v, HS 1.0 ml Nettle 1:20 w/v, HS 1.0 ml Plantain, English 1:20 w/v, HS 1.0 ml Ragweed Mixed 1:20 w/v ALK  0.6 ml Russian Thistle 1:20 w/v, HS 1.0 ml Diluent: HSA qs to 5ml   Refills:  PRN        cetirizine 10 MG tablet   Commonly known as:  zyrTEC   Dose:  10 mg   Quantity:  60 tablet        Take 1 tablet (10 mg) by mouth 2 times daily   Refills:  11        EPINEPHrine 0.3 MG/0.3ML injection 2-pack   Commonly known as:  EPIPEN 2-ODETTE   Dose:  0.3 mg   Quantity:  0.6 mL        Inject 0.3 mLs (0.3 mg) into the muscle once as needed for anaphylaxis   Refills:  3        haloperidol 0.5 MG tablet   Commonly known as:  HALDOL   Dose:  1 mg        Take 1 mg by mouth 2 times daily 3mg in the morning;  4 mg at night   Refills:  0        levonorgestrel 20 MCG/24HR IUD   Commonly known as:  MIRENA   Dose:  1 each        1 each  (20 mcg) by Intrauterine route continuous   Refills:  0        melatonin 3 MG tablet   Dose:  3 mg   Quantity:  30 tablet        Take 1 tablet (3 mg) by mouth At Bedtime   Refills:  0        traZODone 50 MG tablet   Commonly known as:  DESYREL   Dose:  50 mg   Quantity:  30 tablet        Take 1 tablet (50 mg) by mouth At Bedtime   Refills:  3        * Notice:  This list has 4 medication(s) that are the same as other medications prescribed for you. Read the directions carefully, and ask your doctor or other care provider to review them with you.            Prescriptions were sent or printed at these locations (1 Prescription)                   [place], [place] - [address] [place] [address]    Telephone:  [phone]   Fax:  [phone]   Hours:                  E-Prescribed (1 of 1)         ranitidine (ZANTAC) 150 MG tablet                Orders Needing Specimen Collection     None      Pending Results     No orders found from 5/22/2018 to 5/25/2018.            Pending Culture Results     No orders found from 5/22/2018 to 5/25/2018.            Pending Results Instructions     If you had any lab results that were not finalized at the time of your Discharge, you can call the ED Lab Result RN at [phone]. You will be contacted by this team for any positive Lab results or changes in treatment. The nurses are available 7 days a week from 10A to 6:30P.  You can leave a message 24 hours per day and they will return your call.        Test Results From Your Hospital Stay               Thank you for choosing [facility]       Thank you for choosing [facility] for your care. Our goal is always to provide you with excellent care. Hearing back from our patients is one way we can continue to improve our services. Please take a few minutes to complete the written survey that you may receive in the mail after you visit with us. Thank you!        MyChart Information      "[employer] lets you send messages to your doctor, view your test results, renew your prescriptions, schedule appointments and more. To sign up, go to [email or web address] . Click on \"Log in\" on the left side of the screen, which will take you to the Welcome page. Then click on \"Sign up Now\" on the right side of the page.     You will be asked to enter the access code listed below, as well as some personal information. Please follow the directions to create your username and password.     Your access code is: [email or web address]  Expires: 2018  3:10 PM     Your access code will  in 90 days. If you need help or a new code, please call your [facility] clinic or [phone].        Care EveryWhere ID     This is your Care EveryWhere ID. This could be used by other organizations to access your [facility] medical records  [id number]        Equal Access to Services     ARMOND MCKEON AH: Hadii linda robbinso Soradha, waaxda luqadaha, qaybta kaalmada adejef, lincoln maciel . So [facility] [phone].    ATENCIÓN: Si habla raymundoañol, tiene a titus disposición servicios gratuitos de asistencia lingüística. Llame al [phone].    We comply with applicable federal civil rights laws and [place] laws. We do not discriminate on the basis of race, color, national origin, age, disability, sex, sexual orientation, or gender identity.            After Visit Summary       This is your record. Keep this with you and show to your community pharmacist(s) and doctor(s) at your next visit.                  "

## 2018-05-24 NOTE — DISCHARGE INSTRUCTIONS
Medicines for Acid Reflux  Your healthcare provider has told you that you have acid reflux. This condition causes stomach acid to wash up into your throat. For most people, acid reflux is troubling but not dangerous. But left untreated, acid reflux sometimes damages the esophagus. Medicines can help control acid reflux and limit your risk of future problems.  Medicines for acid reflux  Your healthcare provider may prescribe medicine to help treat your acid reflux. Medicine will be based on your symptoms and any test results. Your provider will explain how to take your medicine. You will also be told about possible side effects.  Reducing stomach acid  Your provider may suggest antacids that you can buy over the counter. Antacids can give fast relief. Or you may be told to take a type of medicine called H2 blockers. These are available over the counter and by prescription (for higher doses).  Blocking stomach acid  In more severe cases, your healthcare provider may suggest stronger medicines such as proton pump inhibitors (PPIs). These keep the stomach from making acid. They are often prescribed for long-term use.  Other medicines  In some cases medicines to reduce or block stomach acid may not work. Then you may be switched to another type of medicine that helps your stomach empty better.     Date Last Reviewed: 10/1/2016    2927-3509 [employer]. [address] [place] [address], [place] [address]. All rights reserved. This information is not intended as a substitute for professional medical care. Always follow your healthcare professional's instructions.          Discharge Instructions for Gastroesophageal Reflux Disease (GERD)  Gastroesophageal reflux disease (GERD) is a backflow of acid from the stomach into the swallowing tube (esophagus).  Home care  These home care steps can help you manage GERD:    Maintain a healthy weight. Get help to lose any extra pounds.    Avoid lying down after meals.    Avoid  eating late at night.    Elevate the head of your bed by 6 inches. You can do this by placing wooden blocks or bed risers under the head of your bed.    Avoid wearing tight-fitting clothes.    Avoid foods that might irritate your stomach, such as the following:  ? Alcohol  ? Fat  ? Chocolate  ? Caffeine  ? Spearmint or peppermint    Talk to your healthcare provider if you are taking any of the following medicines. These medicines can make GERD symptoms worse:  ? Calcium channel blockers  ? Theophylline  ? Anticholinergic medicines, such as oxybutynin and benzatropine    Begin an exercise program. Ask your healthcare provider how to get started. You can benefit from simple activities, such as walking or gardening.    Break the smoking habit. Enroll in a stop-smoking program to improve your chances of success.    Limit alcohol intake to no more than 2 drinks a day.    Take your medicines exactly as directed. Don t skip doses.    Avoid over-the-counter nonsteroidal anti-inflammatory medicines, such as aspirin and ibuprofen, unless recommended by your healthcare provider for certain conditions.     If possible, avoid nitrates (heart medicines, such as nitroglycerin and isosorbide dinitrate ).  Follow-up care  Make a follow-up appointment as directed by our staff.     When to call the healthcare provider  Call your healthcare provider immediately if you have any of the following:    Trouble swallowing    Pain when swallowing    Feeling of food caught in your chest or throat    Pain in the neck, chest, or back    Heartburn that causes you to vomit    Vomiting blood    Black or tarry stools (from digested blood)    More saliva (watering of the mouth) than usual    Weight loss of more than 3% to 5% of your total body weight in a month    Hoarseness or sore throat that won t go away    Choking, coughing, or wheezing   Date Last Reviewed: 7/1/2016 2000-2017 The "[employer]. [address] [facility], [facility] [place]  [address]. All rights reserved. This information is not intended as a substitute for professional medical care. Always follow your healthcare professional's instructions.

## 2018-05-24 NOTE — LETTER
[place] CARE COORDINATION  [address] [place] [address]        May 24, 2018      Maddy Ortiz  [address] [address]    Dear Maddy,    I am a clinic care coordinator who works with PHILL Luo CNP at the [facility]. I wanted to thank you for spending the time to talk with me. I alsowanted to  provide you with my contact information so that you can call me with questions or concerns about your health care. Below is a description of clinic care coordination and how I can further assist you.     The clinic care coordinator is a registered nurse and/or  who understand the health care system. The goal of clinic care coordination is to help you manage your health and improve access to the [facility] in the most efficient manner. The registered nurse can assist you in meeting your health care goals by providing education, coordinating services, and strengthening the communication among your providers. The  can assist you with financial, behavioral, psychosocial, chemical dependency, counseling, and/or psychiatric resources.    Please feel free to contact me at [phone], with any questions or concerns. We at [facility] are focused on providing you with the highest-quality healthcare experience possible and that all starts with you.     Sincerely,     Richard SMITH,RN- BC  Clinic Care Coordinator  [facility]  Phone: [phone]    Enclosed: I have enclosed a copy of a 24 Hour Access Plan. This has helpful phone numbers for you to call when needed. I have also enclosed helpful educational material. Please review and call me with any questions.

## 2018-05-24 NOTE — PROGRESS NOTES
"Adult Mental Health Outpatient Group Therapy Progress Note       Name of Group:  4C Group Therapy   Time:            2:00-2:50 pm  Group Therapy      Date:              5/24//2018    Therapist:      Dariusz Spaulding, D,  L.P.          Client Initial Individualized Goals for Treatment:   \"to prepare for a healthy lifestyle\".        Diagnosis  295.70  (F25) Schizoaffective Disorder Bipolar Type      Treatment Goals:   1.  Personal Safety  Report any urges or thoughts to harm yourself to the Tx team.      2.  Self-Support Skills:  practice coping skills/strategies to help   3.  Group Therapy learn and practice 2 skills to manage the anxiety, depression and reduce negative thoughts.   4.  Community Resources: Find a support group near home, such as [facility]      Area of Treatment Focus:  Symptom Management  Personal Safety      Therapeutic Interventions/Treatment Strategies:  Support, Feedback, Safety Assessments, Structured Activity and Education      Response to Treatment Strategies:  Accepted Feedback, Listened, Attentive, Accepted Support and Alert      Description and Therapeutic Outcome:     Time:     2:00-2:50 pm  Group Therapy                   Maddy reported being safe today.  She reported that she baked on the weekend and visited with her boyfriend. She said that she bought a muffin tin and some mix and they made blueberry muffins.  She reported that she continues to work on her woodworking project, but needs more materials, so she has to wait for another check to arrive, so she can buy more supplies.  She reported that she enjoys baking and talked with the group about baking bread and other items.   She stated that she goes to a Mindfulness group each week and looks forward to it.   She participated in talking about depression and anxiety issues and talked to other members about their issues.  She colored on a mandala and stayed in group the entire hour.  She participated in 10 minutes of mindfulness " stretches with the group.         Client demonstrated understanding of session content by participating in the group therapy discussion, and sharing ideas with others in the group for ways to cope.      Client will benefit from additional opportunities to practice and implement content from this session and receive feedback from the group.               Is this a Weekly Review of the Progress on the Treatment Plan?  Yes.        Are Treatment Plan Goals being addressed?  Yes, continue treatment goals          Are Treatment Plan Strategies to Address Goals Effective?  Yes, continue treatment strategies          Are there any current contracts in place?  No

## 2018-05-24 NOTE — PROGRESS NOTES
"Adult Mental Health Outpatient Group Therapy Progress Note     Client Initial Individualized Goals for Treatment: \"to prepare for a healthy lifestyle\"      See Initial Treatment suggestions for the client during the time between Diagnostic Assessment and completion of the Master Individualized Treatment Plan.    Treatment Goals:    1.Client will notify staff when needing assistance to develop or implement a coping plan to manage suicidal or self injurious urges.  2. When in self support skills group client will learn and practice 1-2 coping skills to help mange stress,decrease procrastination and make better personal decisions providing an update of progress weekly.     Area of Treatment Focus:  Symptom Management, Develop / Improve Independent Living Skills and Develop Socialization / Interpersonal Relationship Skills    Therapeutic Interventions/Treatment Strategies:  Support, Feedback and Structured Activity    Response to Treatment Strategies:  Accepted Feedback, Gave Feedback, Listened, Attentive and Alert    Name of Group:  1:00 Self support skills     Description and Outcome:  Maddy attended self support skills today. She presented as fairly groomed with restricted affect, but pleasant interactions. She was observed to giggle to herself occassionally. She initiated contribution and shared when encouraged. Group focused on the importance of self-esteem building through verbal psychoeducation and group feedback, facilitated by story creation. She demonstrated concrete interpretation of instruction. When identifying something she feels ashamed of she said \"being sleepy/drowsy.\" When asked clarifying questions she giggled and said \"I don't know, I don't really feel shameful about anything.\" At one point, reported \"I am worried about being crazy. I don't want to be crazy.\" Did not expand on her perception of \"crazy\" when asked to do so.     Client would benefit from additional opportunities to practice and " implement content from this session.      Is this a Weekly Review of the Progress on the Treatment Plan?  No

## 2018-05-24 NOTE — ED NOTES
PT presents to the ED with complaints of epigastric pain. States that she is also having feeling of urning in her throat. States she was unable to buy OTC meds so she came to the ED for reflux meds. PT is A&OX4, appears to be in NAD with no SOB noted at this time. All needs are being assessed and will be met and all comfort measures are being addressed. Awaiting MD fink and orders at this time.

## 2018-05-24 NOTE — PROGRESS NOTES
Clinic Care Coordination Contact  Clinic Care Coordination Contact  OUTREACH  Referral Information:  Referral Source: ED Follow-Up  Primary Diagnosis: GI Disorders (gastro esopgageal reflux)  Chief Complaint   Patient presents with     Clinic Care Coordination - Initial     ED F/U RN CC   [place] Utilization: Clinic Utilization  Difficulty keeping appointments: Yes (8 no shows in 1 yr)  Utilization    Last refreshed: 5/24/2018  4:01 AM:  No Show Count (past year) 8       Last refreshed: 5/24/2018  4:01 AM:  ED visits 3       Last refreshed: 5/24/2018  4:01 AM:  Hospital admissions 0          Current as of: 5/24/2018  4:01 AM         Clinical Concerns:  Current Medical Concerns:  Came to ED with epigastric pain. Per provider note:  Assessments & Plan (with Medical Decision Making)  32-year-old female with history of schizoaffective disorder with bipolar, paranoia, and depression presenting for evaluation of recurrent episodes of burning discomfort in her epigastrium radiating to her chest.  Symptoms worse at night and when laying down and somewhat improved sitting upright but can happen all day long.  Denies associated diaphoresis, nausea, or dyspnea.  Symptoms are nonexertional.  Has not taken any medications because she reports she has been wanting to take an over-the-counter antacid.  Well-appearing in the emergency department in no distress with no concerning underlying features suggest a dangerous underlying cause of her symptoms.  Treated with a GI cocktail given a prescription for ranitidine and recognition of follow-up with primary care in about 1 week to reassess.      Current Behavioral Concerns: schizoaffective disorder with bipolar, paranoia, and depression (sees counselor/psych CNS at [place])-has supports in place  Education Provided to patient: role of RN CC, filling Zantac script     Pain  Chronic pain (GOAL)::No  Health Maintenance Reviewed: Up to date  Clinical Pathway: None    Medication  Management:  Has not filled Zantac script. Reports she will do today     Functional Status:  Mobility Status: Independent  Fallen 2 or more times in the past year?: No  Any fall with injury in the past year?: No    Living Situation:  Current living arrangement: I live in a private home with family    Diet/Exercise/Sleep:  Diet:: Regular  Inadequate nutrition (GOAL): No  Food Insecurity: No  Tube Feeding: No  Inadequate activity/exercise (GOAL):: No  Significant changes in sleep pattern (GOAL): No    Transportation:  Transportation concerns (GOAL): No  Transportation means: Accessible car, Regular car     Psychosocial:  Rastafari or spiritual beliefs that impact treatment: No  Mental health DX: Yes  Mental health DX how managed: Medication, BHC Services at Primary Care, Outpatient Counseling, Group therapy  Mental health management concern (GOAL): No  Informal Support system: Family  Financial/Insurance:   Could not afford OTC heart burn/reflux meds so came to ED. Now has a script to be filled.     Resources and Interventions:  Current Resources:  Community Resources: OP Mental Health  Equipment Currently Used at Home: none    Advance Care Plan/Directive  Advanced Care Plans/Directives on file: No    Patient/Caregiver understanding:patient reports she is not having heart burn type pain today. Reports it is always worse at night. Discussed not eating late, avoiding spicy, acidic foods, propping in bed if needed. She reports she has a good understanding of preventative measures. Reports she has new script and intends to fill. Discussed importance of taking this twice daily as directed. Verbalizes understanding. Advised that she should make an appointment with PCP within a week (as per DC instruction) to discuss ongoing plan/treatment for reflux.Reports she cannot make appointment at this time but will call later today. Denied any ongoing CC needs.Declined follow up at this time.       Future Appointments               Today ADULT MH DAY 4C [facility]    In 5 days ADULT MH DAY 4C [facility]    In 6 days Magdy Blevins DO [facility] [place], [place]    In 1 week ADULT MH DAY 4C [facility]    In 1 week ADULT MH DAY 4C [facility]    In 1 week ADULT MH DAY 4C [facility]    In 1 week Magdy Blevins DO [facility] [place], [place]    In 2 weeks ADULT MH DAY 4C [facility]    In 2 weeks ADULT MH DAY 4C [facility]    In 2 weeks ADULT MH DAY 4C [facility]    In 3 weeks ADULT MH DAY 4C [facility]    In 3 weeks ADULT MH DAY 4C [facility]    In 3 weeks ADULT MH DAY 4C [facility]    In 3 weeks Magdy Blevins DO [facility] [place], [place]    In 1 month Magdy Blevins DO [facility] [place], [place]    In 1 month Maldonado Michele MD [facility], [facility]    In 1 month Magdy Blevins DO [facility] [place], [place]    In 1 month GenMagdy williamson DO [facility] [place], [place]          Plan: RN CC will mail intro letter but no further outreaches planned at this time.    Richard SMITH,RN- BC  Clinic Care Coordinator  [facility]  Phone: [phone]

## 2018-05-25 ASSESSMENT — PATIENT HEALTH QUESTIONNAIRE - PHQ9: SUM OF ALL RESPONSES TO PHQ QUESTIONS 1-9: 3

## 2018-05-25 ASSESSMENT — ANXIETY QUESTIONNAIRES: GAD7 TOTAL SCORE: 5

## 2018-05-29 ENCOUNTER — HOSPITAL ENCOUNTER (OUTPATIENT)
Dept: BEHAVIORAL HEALTH | Facility: CLINIC | Age: 33
End: 2018-05-29
Attending: PSYCHIATRY & NEUROLOGY
Payer: COMMERCIAL

## 2018-05-29 PROCEDURE — H2012 BEHAV HLTH DAY TREAT, PER HR: HCPCS
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
## 2018-05-30 ENCOUNTER — OFFICE VISIT (OUTPATIENT)
Dept: ALLERGY | Facility: OTHER | Age: 33
End: 2018-05-30
Payer: COMMERCIAL

## 2018-05-30 VITALS
DIASTOLIC BLOOD PRESSURE: 68 MMHG | SYSTOLIC BLOOD PRESSURE: 120 MMHG | BODY MASS INDEX: 33.28 KG/M2 | HEART RATE: 86 BPM | OXYGEN SATURATION: 97 % | TEMPERATURE: 97.8 F | WEIGHT: 200 LBS

## 2018-05-30 DIAGNOSIS — J30.1 CHRONIC SEASONAL ALLERGIC RHINITIS DUE TO POLLEN: Primary | ICD-10-CM

## 2018-05-30 DIAGNOSIS — J30.89 ALLERGIC RHINITIS DUE TO MOLD: ICD-10-CM

## 2018-05-30 DIAGNOSIS — J30.89 ALLERGIC RHINITIS DUE TO DUST MITE: ICD-10-CM

## 2018-05-30 DIAGNOSIS — J30.81 ALLERGIC RHINITIS DUE TO ANIMAL DANDER: ICD-10-CM

## 2018-05-30 PROCEDURE — 95180 RAPID DESENSITIZATION: CPT | Performed by: ALLERGY & IMMUNOLOGY

## 2018-05-30 PROCEDURE — 99207 ZZC DROP WITH A PROCEDURE: CPT | Mod: 25 | Performed by: ALLERGY & IMMUNOLOGY

## 2018-05-30 RX ORDER — ALBUTEROL SULFATE 90 UG/1
AEROSOL, METERED RESPIRATORY (INHALATION)
Refills: 2 | Status: ON HOLD | COMMUNITY
Start: 2018-01-18 | End: 2018-08-24

## 2018-05-30 RX ORDER — HALOPERIDOL 1 MG/1
7 TABLET ORAL AT BEDTIME
Refills: 2 | Status: ON HOLD | COMMUNITY
Start: 2018-05-04 | End: 2018-08-29

## 2018-05-30 NOTE — PROGRESS NOTES
"Adult Mental Health Outpatient Group Therapy Progress Note     Client Initial Individualized Goals for Treatment: \"to prepare for a healthy lifestyle\"      See Initial Treatment suggestions for the client during the time between Diagnostic Assessment and completion of the Master Individualized Treatment Plan.    Treatment Goals:  1.Client will notify staff when needing assistance to develop or implement a coping plan to manage suicidal or self injurious urges.  2. When in self support skills group client will learn and practice 1-2 coping skills to help mange stress,decrease procrastination and make better personal decisions providing an update of progress weekly.     Area of Treatment Focus:  Symptom Management    Therapeutic Interventions/Treatment Strategies:  Support, Feedback, Safety Assessments, Structured Activity and Education    Response to Treatment Strategies:  Accepted Feedback, Listened, Attentive, Accepted Support and Alert    Name of Group: Life Skills (3:00-3:50)    Description and Outcome:  Client presented with calm,even mood with fair concentration and focus.Psychoeducation information presented included a discussion related to communication with a focus on social supportThought process clear,goal directed and reality based.Goal #1 (no personal safety concerns reported or observed). Goal #2 Not addressed.  Client would benefit from additional opportunities to practice and implement content from this session in her every day life and mental health recovery.    Is this a Weekly Review of the Progress on the Treatment Plan?  Yes.      Are Treatment Plan Goals being addressed?  Yes, continue treatment goals      Are Treatment Plan Strategies to Address Goals Effective?  Yes, continue treatment strategies      Are there any current contracts in place?  No      "

## 2018-05-30 NOTE — PROGRESS NOTES
Maddy Ortiz is a 32 year old  female with previous medical history significant for allergic rhinitis who returns for a follow up visit. Maddy Ortiz is being seen today for asthma and seasonal allergies.       Patient presents today for cluster immunotherapy. The patient is currently in a good state of health. No recent fevers, chills, cough, wheezing, shortness of breath, skin rash, angioedema, nausea, vomiting or diarrhea. The risks and benefits were discussed and the patient/patient's family wishes to proceed. The consent was signed.    Past Medical History:   Diagnosis Date     Bipolar 1 disorder (H) 12/6/2012     Depressive disorder      Paranoid type delusional disorder (H) 11/14/2012     Family History   Problem Relation Age of Onset     Adopted: Yes     Unknown/Adopted Mother      Unknown/Adopted Father      Unknown/Adopted Other      Past Surgical History:   Procedure Laterality Date     TONSILLECTOMY & ADENOIDECTOMY      as a child       REVIEW OF SYSTEMS:  General: negative for weight gain. negative for weight loss. negative for changes in sleep.   Ears: negative for fullness. negative for hearing loss. negative for dizziness.   Nose: negative for snoring.negative for changes in smell. negative for drainage.   Throat: negative for hoarseness. negative for sore throat. negative for trouble swallowing.   Lungs: negative for shortness of breath.negative for wheezing. negative for sputum production.   Cardiovascular: negative for chest pain. negative for swelling of ankles. negative for fast or irregular heartbeat.   Gastrointestinal: negative for nausea. negative for heartburn. negative for acid reflux.   Musculoskeletal: negative for joint pain. negative for joint stiffness. negative for joint swelling.   Neurologic: negative for seizures. negative for fainting. negative for weakness.   Psychiatric: negative for changes in mood. negative for anxiety.   Endocrine: negative for cold intolerance.  negative for heat intolerance. negative for tremors.   Hematologic: negative for easy bruising. negative for easy bleeding.  Integumentary: negative for rash. negative for scaling. negative for nail changes.       Current Outpatient Prescriptions:      cetirizine (ZYRTEC) 10 MG tablet, Take 1 tablet (10 mg) by mouth 2 times daily, Disp: 60 tablet, Rfl: 11     EPINEPHrine (EPIPEN 2-ODETTE) 0.3 MG/0.3ML injection 2-pack, Inject 0.3 mLs (0.3 mg) into the muscle once as needed for anaphylaxis, Disp: 0.6 mL, Rfl: 3     haloperidol (HALDOL) 1 MG tablet, Take 7 mg by mouth daily , Disp: , Rfl: 2     levonorgestrel (MIRENA) 20 MCG/24HR IUD, 1 each (20 mcg) by Intrauterine route continuous, Disp: , Rfl:      melatonin 3 MG tablet, Take 1 tablet (3 mg) by mouth At Bedtime, Disp: 30 tablet, Rfl: 0     ORDER FOR ALLERGEN IMMUNOTHERAPY, Cat Hair, Standardized 10,000 BAU/mL, ALK  3.0 ml Dog Hair Dander, A. P.  1:100 w/v, HS  1.0 ml Dust Mites F 30,000AU/mL, HS  0.5 ml Dust Mites P. 30,000 AU/mL, HS  0.5 ml  Diluent: HSA qs to 5ml, Disp: 5 mL, Rfl: PRN     ORDER FOR ALLERGEN IMMUNOTHERAPY, Alternaria Tenuis 1:10 w/v, HS  0.5 ml Epicoccum Nigrum 1:10 w/v, HS 0.5 ml Hormodendrum Cladosporioides 1:10 w/v, HS 0.5 ml Diluent: HSA qs to 5ml, Disp: 5 mL, Rfl: PRN     ORDER FOR ALLERGEN IMMUNOTHERAPY, Estuardo, White  1:20 w/v, HS  0.5 ml Birch Mix PRW 1:20 w/v, HS  0.5 ml Elm, American 1:20 w/v, HS  0.5 ml Hackberry 1:20 w/v, HS 0.5 ml Hickory, Shagbark 1:20 w/v, HS  0.5 ml [place] Mix RW 1:20 w/v, HS 0.5 ml Oak Mix RVW 1:20 w/v, HS 0.5 ml [place] Tree, Black 1:20 w/v, HS 0.5 ml [place], Black 1:20 w/v, HS 0.5 ml Diluent: HSA qs to 5ml, Disp: 5 mL, Rfl: PRN     ORDER FOR ALLERGEN IMMUNOTHERAPY, Kochia 1:20 w/v, HS 1.0 ml Nettle 1:20 w/v, HS 1.0 ml Plantain, English 1:20 w/v, HS 1.0 ml Ragweed Mixed 1:20 w/v ALK  0.6 ml Russian Thistle 1:20 w/v, HS 1.0 ml Diluent: HSA qs to 5ml, Disp: 5 mL, Rfl: PRN     ranitidine (ZANTAC) 150 MG tablet, Take 1  tablet (150 mg) by mouth 2 times daily, Disp: 60 tablet, Rfl: 0     traZODone (DESYREL) 50 MG tablet, Take 1 tablet (50 mg) by mouth At Bedtime, Disp: 30 tablet, Rfl: 3     haloperidol (HALDOL) 0.5 MG tablet, Take 1 mg by mouth 2 times daily 3mg in the morning;  4 mg at night, Disp: , Rfl:      VENTOLIN  (90 Base) MCG/ACT Inhaler, , Disp: , Rfl: 2  Immunization History   Administered Date(s) Administered     Influenza (IIV3) PF 12/06/2012     Influenza Vaccine IM 3yrs+ 4 Valent IIV4 11/21/2017     TDAP Vaccine (Adacel) 12/06/2012     Allergies   Allergen Reactions     Animal Dander      Nkda [No Known Drug Allergies]      Seasonal Allergies      Weeds and mold         EXAM:   Constitutional:  Appears well-developed and well-nourished. No distress.   HEENT:   Head: Normocephalic.   Mouth/Throat: No oropharyngeal exudate present.   No cobblestoning of posterior oropharynx.   Nasal tissue pink and normal appearing.  No rhinorrhea noted.    Eyes: Conjunctivae are non-erythematous   Cardiovascular: Normal rate, regular rhythm and normal heart sounds. Exam reveals no gallop and no friction rub.   No murmur heard.  Respiratory: Effort normal and breath sounds normal. No respiratory distress. No wheezes. No rales.   Musculoskeletal: Normal range of motion.   Lymphadenopathy:   No cervical adenopathy.   Neuro: Oriented to person, place, and time.  Skin: Skin is warm and dry. No rash noted.   Psychiatric: Normal mood and affect.     Nursing note and vitals reviewed.      WORKUP:  Cluster immunotherapy   The patient received yellow 0.1, yellow 0.15, and yellow 0.25. Each dose was  by 30 minutes. Full report will be scanned into electronic medical record. The patient was in office for over 1.5 hours.      ASSESSMENT/PLAN:  Problem List Items Addressed This Visit        Respiratory    Seasonal allergic rhinitis due to pollen - Primary     History of nasal and ocular symptoms for multiple years. Perennial  with spring and fall worsening. Tried cetirizine and loratadine which were helpful. Currently used Allegra as needed. Allergy testing done in 2013 positive for dust mite, cat, dog, molds, trees, grasses and weeds. Was tolerating cluster but stopped given psychiatric concerns. She has followed with psychiatrist and now in a place to resume shots and feeling much better from a psychiatric standpoint. Tolerated cluster immunotherapy at last visit.       Skin testing  Positive for cat, dog, dust mites, weeds, trees and molds.       - Flonase 2 spray/nostril daily. Use spring and fall at the very least.   - Allegra or Zyrtec as needed and on allergy shot days.   - Continue allergen immunotherapy.                   Relevant Medications    VENTOLIN  (90 Base) MCG/ACT Inhaler    Other Relevant Orders    RAPID DESENSITIZATION (Completed)    Allergic rhinitis due to mold    Relevant Medications    VENTOLIN  (90 Base) MCG/ACT Inhaler    Other Relevant Orders    RAPID DESENSITIZATION (Completed)    Allergic rhinitis due to animal dander    Relevant Medications    VENTOLIN  (90 Base) MCG/ACT Inhaler    Other Relevant Orders    RAPID DESENSITIZATION (Completed)    Allergic rhinitis due to dust mite    Relevant Medications    VENTOLIN  (90 Base) MCG/ACT Inhaler    Other Relevant Orders    RAPID DESENSITIZATION (Completed)          Chart documentation with Dragon Voice recognition Software. Although reviewed after completion, some words and grammatical errors may remain.    Magdy Blevins,    Allergy/Immunology  [facility], [place] and [place]

## 2018-05-30 NOTE — LETTER
5/30/2018         RE: Maddy Ortiz  [address]  [facility] [id number]        Dear Colleague,    Thank you for referring your patient, Maddy Ortiz, to the [facility]. Please see a copy of my visit note below.      Maddy Ortiz is a 32 year old  female with previous medical history significant for allergic rhinitis who returns for a follow up visit. Maddy Ortiz is being seen today for asthma and seasonal allergies.       Patient presents today for cluster immunotherapy. The patient is currently in a good state of health. No recent fevers, chills, cough, wheezing, shortness of breath, skin rash, angioedema, nausea, vomiting or diarrhea. The risks and benefits were discussed and the patient/patient's family wishes to proceed. The consent was signed.    Past Medical History:   Diagnosis Date     Bipolar 1 disorder (H) 12/6/2012     Depressive disorder      Paranoid type delusional disorder (H) 11/14/2012     Family History   Problem Relation Age of Onset     Adopted: Yes     Unknown/Adopted Mother      Unknown/Adopted Father      Unknown/Adopted Other      Past Surgical History:   Procedure Laterality Date     TONSILLECTOMY & ADENOIDECTOMY      as a child       REVIEW OF SYSTEMS:  General: negative for weight gain. negative for weight loss. negative for changes in sleep.   Ears: negative for fullness. negative for hearing loss. negative for dizziness.   Nose: negative for snoring.negative for changes in smell. negative for drainage.   Throat: negative for hoarseness. negative for sore throat. negative for trouble swallowing.   Lungs: negative for shortness of breath.negative for wheezing. negative for sputum production.   Cardiovascular: negative for chest pain. negative for swelling of ankles. negative for fast or irregular heartbeat.   Gastrointestinal: negative for nausea. negative for heartburn. negative for acid reflux.   Musculoskeletal: negative for joint pain.  negative for joint stiffness. negative for joint swelling.   Neurologic: negative for seizures. negative for fainting. negative for weakness.   Psychiatric: negative for changes in mood. negative for anxiety.   Endocrine: negative for cold intolerance. negative for heat intolerance. negative for tremors.   Hematologic: negative for easy bruising. negative for easy bleeding.  Integumentary: negative for rash. negative for scaling. negative for nail changes.       Current Outpatient Prescriptions:      cetirizine (ZYRTEC) 10 MG tablet, Take 1 tablet (10 mg) by mouth 2 times daily, Disp: 60 tablet, Rfl: 11     EPINEPHrine (EPIPEN 2-ODETTE) 0.3 MG/0.3ML injection 2-pack, Inject 0.3 mLs (0.3 mg) into the muscle once as needed for anaphylaxis, Disp: 0.6 mL, Rfl: 3     haloperidol (HALDOL) 1 MG tablet, Take 7 mg by mouth daily , Disp: , Rfl: 2     levonorgestrel (MIRENA) 20 MCG/24HR IUD, 1 each (20 mcg) by Intrauterine route continuous, Disp: , Rfl:      melatonin 3 MG tablet, Take 1 tablet (3 mg) by mouth At Bedtime, Disp: 30 tablet, Rfl: 0     ORDER FOR ALLERGEN IMMUNOTHERAPY, Cat Hair, Standardized 10,000 BAU/mL, ALK  3.0 ml Dog Hair Dander, A. P.  1:100 w/v, HS  1.0 ml Dust Mites F 30,000AU/mL, HS  0.5 ml Dust Mites P. 30,000 AU/mL, HS  0.5 ml  Diluent: HSA qs to 5ml, Disp: 5 mL, Rfl: PRN     ORDER FOR ALLERGEN IMMUNOTHERAPY, Alternaria Tenuis 1:10 w/v, HS  0.5 ml Epicoccum Nigrum 1:10 w/v, HS 0.5 ml Hormodendrum Cladosporioides 1:10 w/v, HS 0.5 ml Diluent: HSA qs to 5ml, Disp: 5 mL, Rfl: PRN     ORDER FOR ALLERGEN IMMUNOTHERAPY, Estuardo, White  1:20 w/v, HS  0.5 ml Birch Mix PRW 1:20 w/v, HS  0.5 ml Elm, American 1:20 w/v, HS  0.5 ml Hackberry 1:20 w/v, HS 0.5 ml Hickory, Shagbark 1:20 w/v, HS  0.5 ml [place] Mix RW 1:20 w/v, HS 0.5 ml Oak Mix RVW 1:20 w/v, HS 0.5 ml [place] Tree, Black 1:20 w/v, HS 0.5 ml [place], Black 1:20 w/v, HS 0.5 ml Diluent: HSA qs to 5ml, Disp: 5 mL, Rfl: PRN     ORDER FOR ALLERGEN IMMUNOTHERAPY,  Kochia 1:20 w/v, HS 1.0 ml Nettle 1:20 w/v, HS 1.0 ml Plantain, English 1:20 w/v, HS 1.0 ml Ragweed Mixed 1:20 w/v ALK  0.6 ml Russian Thistle 1:20 w/v, HS 1.0 ml Diluent: HSA qs to 5ml, Disp: 5 mL, Rfl: PRN     ranitidine (ZANTAC) 150 MG tablet, Take 1 tablet (150 mg) by mouth 2 times daily, Disp: 60 tablet, Rfl: 0     traZODone (DESYREL) 50 MG tablet, Take 1 tablet (50 mg) by mouth At Bedtime, Disp: 30 tablet, Rfl: 3     haloperidol (HALDOL) 0.5 MG tablet, Take 1 mg by mouth 2 times daily 3mg in the morning;  4 mg at night, Disp: , Rfl:      VENTOLIN  (90 Base) MCG/ACT Inhaler, , Disp: , Rfl: 2  Immunization History   Administered Date(s) Administered     Influenza (IIV3) PF 12/06/2012     Influenza Vaccine IM 3yrs+ 4 Valent IIV4 11/21/2017     TDAP Vaccine (Adacel) 12/06/2012     Allergies   Allergen Reactions     Animal Dander      Nkda [No Known Drug Allergies]      Seasonal Allergies      Weeds and mold         EXAM:   Constitutional:  Appears well-developed and well-nourished. No distress.   HEENT:   Head: Normocephalic.   Mouth/Throat: No oropharyngeal exudate present.   No cobblestoning of posterior oropharynx.   Nasal tissue pink and normal appearing.  No rhinorrhea noted.    Eyes: Conjunctivae are non-erythematous   Cardiovascular: Normal rate, regular rhythm and normal heart sounds. Exam reveals no gallop and no friction rub.   No murmur heard.  Respiratory: Effort normal and breath sounds normal. No respiratory distress. No wheezes. No rales.   Musculoskeletal: Normal range of motion.   Lymphadenopathy:   No cervical adenopathy.   Neuro: Oriented to person, place, and time.  Skin: Skin is warm and dry. No rash noted.   Psychiatric: Normal mood and affect.     Nursing note and vitals reviewed.      WORKUP:  Cluster immunotherapy   The patient received yellow 0.1, yellow 0.15, and yellow 0.25. Each dose was  by 30 minutes. Full report will be scanned into electronic medical record. The  patient was in office for over 1.5 hours.      ASSESSMENT/PLAN:  Problem List Items Addressed This Visit        Respiratory    Seasonal allergic rhinitis due to pollen - Primary     History of nasal and ocular symptoms for multiple years. Perennial with spring and fall worsening. Tried cetirizine and loratadine which were helpful. Currently used Allegra as needed. Allergy testing done in 2013 positive for dust mite, cat, dog, molds, trees, grasses and weeds. Was tolerating cluster but stopped given psychiatric concerns. She has followed with psychiatrist and now in a place to resume shots and feeling much better from a psychiatric standpoint. Tolerated cluster immunotherapy at last visit.       Skin testing  Positive for cat, dog, dust mites, weeds, trees and molds.       - Flonase 2 spray/nostril daily. Use spring and fall at the very least.   - Allegra or Zyrtec as needed and on allergy shot days.   - Continue allergen immunotherapy.                   Relevant Medications    VENTOLIN  (90 Base) MCG/ACT Inhaler    Other Relevant Orders    RAPID DESENSITIZATION (Completed)    Allergic rhinitis due to mold    Relevant Medications    VENTOLIN  (90 Base) MCG/ACT Inhaler    Other Relevant Orders    RAPID DESENSITIZATION (Completed)    Allergic rhinitis due to animal dander    Relevant Medications    VENTOLIN  (90 Base) MCG/ACT Inhaler    Other Relevant Orders    RAPID DESENSITIZATION (Completed)    Allergic rhinitis due to dust mite    Relevant Medications    VENTOLIN  (90 Base) MCG/ACT Inhaler    Other Relevant Orders    RAPID DESENSITIZATION (Completed)          Chart documentation with Dragon Voice recognition Software. Although reviewed after completion, some words and grammatical errors may remain.    Magdy Blevins,    Allergy/Immunology  [facility], [place] and [place], [place]      Again, thank you for allowing me to participate in the care of your patient.         Sincerely,        Magdy Blevins, DO

## 2018-05-30 NOTE — MR AVS SNAPSHOT
After Visit Summary   5/30/2018    Maddy Ortiz    MRN: [id number]           Patient Information     Date Of Birth          1985        Visit Information        Provider Department      5/30/2018 1:00 PM Magdy Blevins DO [facility]        Today's Diagnoses     Chronic seasonal allergic rhinitis due to pollen    -  1    Allergic rhinitis due to mold        Allergic rhinitis due to dust mite        Allergic rhinitis due to animal dander          Care Instructions    Allergy Staff Appt Hours Shot Hours Locations    Physician     Magdy Blevins DO       Support Staff     Nessa SIERRA RN      Neeru SIERRA, [facility]  Monday:                      [place] 8-7     Tuesday:         [place] 8-5     Wednesday:        [place]: 7-5     Friday:        [place] 7-5   [place]        Monday: 9-5:50        Wednesday: 2-5:50        Friday: 7-12:50     [place]        Tuesday: 7-10:50        Thursday: 1:30-6:30        Friday: 8:00-3:50     [place]        Monday: 7:10-4:50        Tuesday: 12:30-6:30        Thursday: 7-11:50 [facility]  [address] [place] [address]  Appt Line: [phone]  Allergy RN (Monday):  [phone]    [facility]  [address] [place] [address]  Appt Line: [phone]  Allergy RN (Tues & Wed):  [phone]    [facility]  [address] [place] [address]  Appt Line: [phone]  Allergy RN (Friday):  [phone]       Important Scheduling Information  Aspirin Desensitization: Appt will last 2 clinic days. Please call the Allergy RN line for your clinic to schedule. Discontinue antihistamines 7 days prior to the appointment.     Food Challenges: Appt will last 3-4 hours. Please call the Allergy RN line for your clinic to schedule. Discontinue antihistamines 7 days prior to the appointment.     Penicillin Testing: Appt will last 2-3 hours. Please call the Allergy RN line for your clinic to schedule. Discontinue antihistamines 7  days prior to the appointment.     Skin Testing: Appt will about 40 minutes. Call the appointment line for your clinic to schedule. Discontinue antihistamines 7 days prior to the appointment.     Venom Testing: Appt will last 2-3 hours. Please call the Allergy RN line for your clinic to schedule. Discontinue antihistamines 7 days prior to the appointment.     Thank you for trusting us with your Allergy, Asthma, and Immunology care. Please feel free to contact us with any questions or concerns you may have.                Follow-ups after your visit        Your next 10 appointments already scheduled     May 31, 2018  1:00 PM CDT   Return Visit with ADULT  DAY 4C   [facility] ([facility])    [address] [place] [address]   [phone]            Jun 04, 2018  1:00 PM CDT   Return Visit with ADULT  DAY 4C   [facility] ([facility])    [address] [place] [address]   [phone]            Jun 05, 2018  1:00 PM CDT   Return Visit with ADULT  DAY 4C [facility] ([facility])    [address] [place] [address]   [phone]            Jun 06, 2018  1:00 PM CDT   Return Visit with Magdy Blevins DO   [facility]    [address]  [facility] [address]   [phone]            Jun 07, 2018  1:00 PM CDT   Return Visit with ADULT  DAY 4C [facility] ([facility])    [address] [place] [address]   [phone]            Jun 11, 2018  1:00 PM CDT   Return Visit with ADULT  DAY 4C [facility] ([facility])    [address] [place] [address]  "  [phone]            Jun 12, 2018  1:00 PM CDT   Return Visit with ADULT MH DAY 4C   [facility] ([facility])    [address] [address]   [phone]            Jun 14, 2018  1:00 PM CDT   Return Visit with ADULT MH DAY 4C   [facility] ([facility])    [address] [address]   [phone]            Jun 18, 2018  1:00 PM CDT   Return Visit with ADULT MH DAY 4C   [facility] ([facility])    [address] [address]   [phone]            Jun 19, 2018  1:00 PM CDT   Return Visit with ADULT MH DAY 4C   [facility]    [facility] [address] [address]   [phone]              Who to contact     If you have questions or need follow up information about today's clinic visit or your schedule please contact [facility] directly at [phone].  Normal or non-critical lab and imaging results will be communicated to you by MyChart, letter or phone within 4 business days after the clinic has received the results. If you do not hear from us within 7 days, please contact the clinic through MyChart or phone. If you have a critical or abnormal lab result, we will notify you by phone as soon as possible.  Submit refill requests through [employer] or call your pharmacy and they will forward the refill request to us. Please allow 3 business days for your refill to be completed.          Additional Information About Your Visit        spotfluxharC[employer] and Power Information     [employer] lets you send messages to your doctor, view your test results, renew your prescriptions, schedule appointments and more. To sign up, go to [email or web address] . Click on \"Log in\" on the left " "side of the screen, which will take you to the Welcome page. Then click on \"Sign up Now\" on the right side of the page.     You will be asked to enter the access code listed below, as well as some personal information. Please follow the directions to create your username and password.     Your access code is: [email or web address]  Expires: 2018  3:10 PM     Your access code will  in 90 days. If you need help or a new code, please call your [facility] clinic or [phone].        Care EveryWhere ID     This is your Care EveryWhere ID. This could be used by other organizations to access your [facility] medical records  [id number]        Your Vitals Were     Pulse Temperature Pulse Oximetry BMI (Body Mass Index)          86 97.8  F (36.6  C) (Oral) 97% 33.28 kg/m2         Blood Pressure from Last 3 Encounters:   18 120/68   18 (!) 136/92   18 118/82    Weight from Last 3 Encounters:   18 90.7 kg (200 lb)   18 90.3 kg (199 lb)   18 90.3 kg (199 lb)              We Performed the Following     RAPID DESENSITIZATION        Primary Care Provider Office Phone # Fax #    Shirley PHILL [facility] [phone] [address] [facility] [address]        Equal Access to Services     ARMOND MCKEON : Hadii linda ku hadasho Sohubertali, waaxda luqadaha, qaybta kaalmada adeegyada, lincoln maciel . So [facility] [phone].    ATENCIÓN: Si habla español, tiene a titus disposición servicios gratuitos de asistencia lingüística. Llame al [phone].    We comply with applicable federal civil rights laws and [place] laws. We do not discriminate on the basis of race, color, national origin, age, disability, sex, sexual orientation, or gender identity.            Thank you!     Thank you for choosing [facility]  for your care. Our goal is always to provide you with excellent care. Hearing back from our patients is one way we can continue " to improve our services. Please take a few minutes to complete the written survey that you may receive in the mail after your visit with us. Thank you!             Your Updated Medication List - Protect others around you: Learn how to safely use, store and throw away your medicines at www.disposemymeds.org.          This list is accurate as of 5/30/18  3:50 PM.  Always use your most recent med list.                   Brand Name Dispense Instructions for use Diagnosis    * ALLERGEN IMMUNOTHERAPY PRESCRIPTION     5 mL    Cat Hair, Standardized 10,000 BAU/mL, ALK  3.0 ml Dog Hair Dander, A. P.  1:100 w/v, HS  1.0 ml Dust Mites F 30,000AU/mL, HS  0.5 ml Dust Mites P. 30,000 AU/mL, HS  0.5 ml  Diluent: HSA qs to 5ml    Allergic rhinitis due to animal dander, Allergic rhinitis due to dust mite       * ALLERGEN IMMUNOTHERAPY PRESCRIPTION     5 mL    Alternaria Tenuis 1:10 w/v, HS  0.5 ml Epicoccum Nigrum 1:10 w/v, HS 0.5 ml Hormodendrum Cladosporioides 1:10 w/v, HS 0.5 ml Diluent: HSA qs to 5ml    Allergic rhinitis due to mold       * ALLERGEN IMMUNOTHERAPY PRESCRIPTION     5 mL    Estuardo, White  1:20 w/v, HS  0.5 ml Birch Mix PRW 1:20 w/v, HS  0.5 ml Elm, American 1:20 w/v, HS  0.5 ml Hackberry 1:20 w/v, HS 0.5 ml Hickory, Shagbark 1:20 w/v, HS  0.5 ml [place] Mix RW 1:20 w/v, HS 0.5 ml Oak Mix RVW 1:20 w/v, HS 0.5 ml Sterling Tree, Black 1:20 w/v, HS 0.5 ml [place], Black 1:20 w/v, HS 0.5 ml Diluent: HSA qs to 5ml    Chronic seasonal allergic rhinitis due to pollen       * ALLERGEN IMMUNOTHERAPY PRESCRIPTION     5 mL    Kochia 1:20 w/v, HS 1.0 ml Nettle 1:20 w/v, HS 1.0 ml Plantain, English 1:20 w/v, HS 1.0 ml Ragweed Mixed 1:20 w/v ALK  0.6 ml Russian Thistle 1:20 w/v, HS 1.0 ml Diluent: HSA qs to 5ml    Chronic seasonal allergic rhinitis due to pollen       cetirizine 10 MG tablet    zyrTEC    60 tablet    Take 1 tablet (10 mg) by mouth 2 times daily    Chronic seasonal allergic rhinitis due to pollen       EPINEPHrine 0.3  MG/0.3ML injection 2-pack    EPIPEN 2-ODETTE    0.6 mL    Inject 0.3 mLs (0.3 mg) into the muscle once as needed for anaphylaxis    Need for desensitization to allergens       * haloperidol 0.5 MG tablet    HALDOL     Take 1 mg by mouth 2 times daily 3mg in the morning;  4 mg at night        * haloperidol 1 MG tablet    HALDOL     Take 7 mg by mouth daily        levonorgestrel 20 MCG/24HR IUD    MIRENA     1 each (20 mcg) by Intrauterine route continuous    Encounter for insertion of mirena IUD       melatonin 3 MG tablet     30 tablet    Take 1 tablet (3 mg) by mouth At Bedtime    Insomnia, unspecified type       ranitidine 150 MG tablet    ZANTAC    60 tablet    Take 1 tablet (150 mg) by mouth 2 times daily        traZODone 50 MG tablet    DESYREL    30 tablet    Take 1 tablet (50 mg) by mouth At Bedtime    Insomnia, unspecified type       VENTOLIN  (90 Base) MCG/ACT Inhaler   Generic drug:  albuterol           * Notice:  This list has 6 medication(s) that are the same as other medications prescribed for you. Read the directions carefully, and ask your doctor or other care provider to review them with you.

## 2018-05-30 NOTE — ASSESSMENT & PLAN NOTE
History of nasal and ocular symptoms for multiple years. Perennial with spring and fall worsening. Tried cetirizine and loratadine which were helpful. Currently used Allegra as needed. Allergy testing done in 2013 positive for dust mite, cat, dog, molds, trees, grasses and weeds. Was tolerating cluster but stopped given psychiatric concerns. She has followed with psychiatrist and now in a place to resume shots and feeling much better from a psychiatric standpoint. Tolerated cluster immunotherapy at last visit.       Skin testing  Positive for cat, dog, dust mites, weeds, trees and molds.       - Flonase 2 spray/nostril daily. Use spring and fall at the very least.   - Allegra or Zyrtec as needed and on allergy shot days.   - Continue allergen immunotherapy.

## 2018-05-31 ENCOUNTER — HOSPITAL ENCOUNTER (OUTPATIENT)
Dept: BEHAVIORAL HEALTH | Facility: CLINIC | Age: 33
End: 2018-05-31
Attending: PSYCHIATRY & NEUROLOGY
Payer: COMMERCIAL

## 2018-05-31 PROCEDURE — H2012 BEHAV HLTH DAY TREAT, PER HR: HCPCS

## 2018-05-31 NOTE — PROGRESS NOTES
"Adult Mental Health Outpatient Group Therapy Progress Note       Name of Group:  4C Group Therapy   Time:            1:00-1:50 pm  Wellness and 2:00-2:50 pm  Group Therapy      Therapist:      Dariusz Spaulding, D,  L.P.          Client Initial Individualized Goals for Treatment:   \"to prepare for a healthy lifestyle\".        Diagnosis  295.70  (F25) Schizoaffective Disorder Bipolar Type      Treatment Goals:   1.  Personal Safety  Report any urges or thoughts to harm yourself to the Tx team.      2.  Self-Support Skills:  practice coping skills/strategies to help   3.  Group Therapy learn and practice 2 skills to manage the anxiety, depression and reduce negative thoughts.   4.  Community Resources: Find a support group near home, such as [facility]      Area of Treatment Focus:  Symptom Management  Personal Safety      Therapeutic Interventions/Treatment Strategies:  Support, Feedback, Safety Assessments, Structured Activity and Education      Response to Treatment Strategies:  Accepted Feedback, Listened, Attentive, Accepted Support and Alert      Name of Group:  4C Wellness and Group Therapy      Description and Therapeutic Outcome:            Nathan Soto reported being safe today. The group participated in a structured activity that involved reading about different ways to manage negative thoughts, voices, visual hallucinations, and paranoia. The group shared their ideas and discussed the skills with other members. The group discussed psychosis symptoms in detail and paranoia, as well as different symptoms of anxiety and fear.  The group discussed Reality Checks. The group shared their responses and experiences.  The group shared their responses to each questions.         The group participated in 10 minutes of mindfulness breathing.       Time:     2:00-2:50 pm  Group Therapy                             Maddy reported that she stopped attending her Mindfulness class with her " boyfriend. She stated that she has been outside for walks, and has been biking.  She reported that her boyfriend is a support for her, and she enjoys his company.  She reported some decrease in the negative thoughts, decrease in the voices, no flashbacks of trauma, but said that she has had some nightmares and problems with sleep.  She reported that she has had some problems with GERD waking her at night and the group discussed this with her.        Client demonstrated understanding of session content by participating in the group therapy discussion, and sharing ideas with others in the group for ways to cope.      Client will benefit from additional opportunities to practice and implement content from this session and receive feedback from the group.                Is this a Weekly Review of the Progress on the Treatment Plan?  Yes.        Are Treatment Plan Goals being addressed?  Yes, continue treatment goals          Are Treatment Plan Strategies to Address Goals Effective?  Yes, continue treatment strategies          Are there any current contracts in place?  No

## 2018-06-01 NOTE — PROGRESS NOTES
"Adult Mental Health Outpatient Group Therapy Progress Note     Client Initial Individualized Goals for Treatment: \"to prepare for a healthy lifestyle\"      See Initial Treatment suggestions for the client during the time between Diagnostic Assessment and completion of the Master Individualized Treatment Plan.    Treatment Goals:  1.Client will notify staff when needing assistance to develop or implement a coping plan to manage suicidal or self injurious urges.  2. When in self support skills group client will learn and practice 1-2 coping skills to help mange stress,decrease procrastination and make better personal decisions providing an update of progress weekly.     Area of Treatment Focus:  Symptom Management    Therapeutic Interventions/Treatment Strategies:  Support, Feedback, Safety Assessments, Structured Activity and Education    Response to Treatment Strategies:  Accepted Feedback, Listened, Attentive, Accepted Support and Alert    Name of Group: Life Skills (3:00-3:50)    Description and Outcome:  Client presented with calm,even mood with fair concentration and focus.Psychoeducation information presented included a discussion related to self esteem with a focus on personal responsibility.Thought process clear,goal directed and reality based.Goal #1 (no personal safety concerns reported or observed). Goal #2 \"I don't know\". Client reports she has an appointment with Rehabilitation [facility] at [facility] on June 12 to  help her get a job. Client is only three classes short of a college degree but she is not motivated to complete the degree. She reports $18,000 in college debt.  Client would benefit from additional opportunities to practice and implement content from this session in her every day life and mental health recovery.    Is this a Weekly Review of the Progress on the Treatment Plan?  Yes.      Are Treatment Plan Goals being addressed?  Yes, continue treatment goals      Are Treatment Plan " Strategies to Address Goals Effective?  Yes, continue treatment strategies      Are there any current contracts in place?  No

## 2018-06-04 ENCOUNTER — HOSPITAL ENCOUNTER (OUTPATIENT)
Dept: BEHAVIORAL HEALTH | Facility: CLINIC | Age: 33
End: 2018-06-04
Attending: PSYCHIATRY & NEUROLOGY
Payer: COMMERCIAL

## 2018-06-04 PROCEDURE — H2012 BEHAV HLTH DAY TREAT, PER HR: HCPCS

## 2018-06-04 NOTE — PROGRESS NOTES
"Adult Mental Health Outpatient Group Therapy Progress Note       Name of Group:  4C Group Therapy   Time:            2:00-2:50 pm  Group Therapy        Therapist:      Dariusz Spaulding, D,  L.P.          Client Initial Individualized Goals for Treatment:   \"to prepare for a healthy lifestyle\".        Diagnosis  295.70  (F25) Schizoaffective Disorder Bipolar Type      Treatment Goals:   1.  Personal Safety  Report any urges or thoughts to harm yourself to the Tx team.      2.  Self-Support Skills:  practice coping skills/strategies to help   3.  Group Therapy learn and practice 2 skills to manage the anxiety, depression and reduce negative thoughts.   4.  Community Resources: Find a support group near home, such as [facility]      Area of Treatment Focus:  Symptom Management  Personal Safety      Therapeutic Interventions/Treatment Strategies:  Support, Feedback, Safety Assessments, Structured Activity and Education      Response to Treatment Strategies:  Accepted Feedback, Listened, Attentive, Accepted Support and Alert      Description and Therapeutic Outcome:     Time:     2:00-2:50 pm  Group Therapy                   Maddy reported being safe today.  She reported that she went to the [employer] and got several bags of groceries.  She reported that she cooked dinner on different days, and heated up pre-made meals.  She reported that she was with her daughter, who is 9 years old, and enjoyed being with her daughter, holding her hand, and taking care of her.  She said that they went to the store to get some bread for sandwiches.  She reported that she feels better about her depression and anxiety level, when she sees her daughter, who is a motivation for her.  She reported that she found a phone russell for reducing anxiety and has been using it.  She reported feeling better, and is more grounded and present when she is with her daughter.    She participated in 10 minutes of mindfulness stretches with the " group.          Client demonstrated understanding of session content by participating in the group therapy discussion, and sharing ideas with others in the group for ways to cope.      Client will benefit from additional opportunities to practice and implement content from this session and receive feedback from the group.                Is this a Weekly Review of the Progress on the Treatment Plan?  Yes.        Are Treatment Plan Goals being addressed?  Yes, continue treatment goals          Are Treatment Plan Strategies to Address Goals Effective?  Yes, continue treatment strategies          Are there any current contracts in place?  No

## 2018-06-05 ENCOUNTER — HOSPITAL ENCOUNTER (OUTPATIENT)
Dept: BEHAVIORAL HEALTH | Facility: CLINIC | Age: 33
End: 2018-06-05
Attending: PSYCHIATRY & NEUROLOGY
Payer: COMMERCIAL

## 2018-06-05 PROCEDURE — H2012 BEHAV HLTH DAY TREAT, PER HR: HCPCS

## 2018-06-05 NOTE — PROGRESS NOTES
"Adult Mental Health Outpatient Group Therapy Progress Note       Name of Group:  4C Group Therapy   Time:            1:00-1:50 pm  Wellness and 2:00-2:50 pm  Group Therapy      Therapist:      Dariusz Spaulding, D,  L.P.          Client Initial Individualized Goals for Treatment:   \"to prepare for a healthy lifestyle\".        Diagnosis  295.70  (F25) Schizoaffective Disorder Bipolar Type      Treatment Goals:   1.  Personal Safety  Report any urges or thoughts to harm yourself to the Tx team.      2.  Self-Support Skills:  practice coping skills/strategies to help   3.  Group Therapy learn and practice 2 skills to manage the anxiety, depression and reduce negative thoughts.   4.  Community Resources: Find a support group near home, such as [facility]      Area of Treatment Focus:  Symptom Management  Personal Safety      Therapeutic Interventions/Treatment Strategies:  Support, Feedback, Safety Assessments, Structured Activity and Education      Response to Treatment Strategies:  Accepted Feedback, Listened, Attentive, Accepted Support and Alert      Name of Group:  4C Wellness and Group Therapy      Description and Therapeutic Outcome:          Mental Health Management   The client reported being safe today. The group participated in a structured activity that involved reading about grounding skills to manage de-personalization and dissociation.   The group practiced an example of a mental grounding skill and named all things in the room that were green.   The group discussed the use of the skills in different situations with other members. The group discussed anxiety symptoms and their feelings of doom that may accompany it, and how to lower the distress level with the grounding skills. The group discussed self-soothing skills and physical focused skills.  The group formed a list of skills to use. The group made a small book to put their list of skills in for future reference.  The group shared their responses to " each questions.          The group participated in 10 minutes of mindfulness breathing.       Time:     2:00-2:50 pm  Group Therapy                  Maddy reported that she has been doing more things with her daughter, and is not ignoring her as before.  She stated that she made Ranch Chicken for supper, and ate it with her daughter and her boyfriend. She stated that her boyfriend will take a leave of absence from his job, since he is having problems with auditory hallucinations and paranoia.  She stated that she went to the grocery store, and bought food for the week, and that tonight they will go to the [employer] for a dinner.  She talked to the group about how Haldol helps her feel happier and thinks that her boyfriend needs a medication adjustment.      Client demonstrated understanding of session content by participating in the group therapy discussion, and sharing ideas with others in the group for ways to cope.   She stated that he has a Fertility appointment on 6/26, and another doctor appointment on 7/2 for her weight.  She offered suggestions to other group members.       Client will benefit from additional opportunities to practice and implement content from this session and receive feedback from the group.        Is this a Weekly Review of the Progress on the Treatment Plan?  Yes.        Are Treatment Plan Goals being addressed?  Yes, continue treatment goals          Are Treatment Plan Strategies to Address Goals Effective?  Yes, continue treatment strategies          Are there any current contracts in place?  No

## 2018-06-05 NOTE — PROGRESS NOTES
"Adult Mental Health Outpatient Group Therapy Progress Note     Client Initial Individualized Goals for Treatment: \"to prepare for a healthy lifestyle\"      See Initial Treatment suggestions for the client during the time between Diagnostic Assessment and completion of the Master Individualized Treatment Plan.    Treatment Goals:  1.Client will notify staff when needing assistance to develop or implement a coping plan to manage suicidal or self injurious urges.  2. When in self support skills group client will learn and practice 1-2 coping skills to help mange stress,decrease procrastination and make better personal decisions providing an update of progress weekly.     Area of Treatment Focus:  Symptom Management    Therapeutic Interventions/Treatment Strategies:  Support, Feedback, Safety Assessments, Structured Activity and Education    Response to Treatment Strategies:  Accepted Feedback, Listened, Attentive, Accepted Support and Alert    Name of Group: Life Skills (3:00-3:50)    Description and Outcome:  Client presented with calm,even mood with fair concentration and focus.Psychoeducation information presented included a discussion related to mental health recovery. Client reports that she has had some troubles sleeping which she thinks is related to GERD.Thought process clear,goal directed and reality based.Goal #1 (no personal safety concerns reported or observed). Goal #2 Addressed per group discussion  Client would benefit from additional opportunities to practice and implement content from this session in her every day life and mental health recovery.    Is this a Weekly Review of the Progress on the Treatment Plan?  Yes.      Are Treatment Plan Goals being addressed?  Yes, continue treatment goals      Are Treatment Plan Strategies to Address Goals Effective?  Yes, continue treatment strategies      Are there any current contracts in place?  No      "

## 2018-06-06 ENCOUNTER — OFFICE VISIT (OUTPATIENT)
Dept: ALLERGY | Facility: OTHER | Age: 33
End: 2018-06-06
Payer: COMMERCIAL

## 2018-06-06 VITALS
TEMPERATURE: 98 F | HEART RATE: 90 BPM | DIASTOLIC BLOOD PRESSURE: 68 MMHG | SYSTOLIC BLOOD PRESSURE: 106 MMHG | WEIGHT: 203 LBS | BODY MASS INDEX: 33.78 KG/M2 | OXYGEN SATURATION: 96 %

## 2018-06-06 DIAGNOSIS — Z51.6 NEED FOR DESENSITIZATION TO ALLERGENS: ICD-10-CM

## 2018-06-06 DIAGNOSIS — J30.1 CHRONIC SEASONAL ALLERGIC RHINITIS DUE TO POLLEN: Primary | ICD-10-CM

## 2018-06-06 DIAGNOSIS — J30.89 ALLERGIC RHINITIS DUE TO DUST MITE: ICD-10-CM

## 2018-06-06 DIAGNOSIS — J30.81 ALLERGIC RHINITIS DUE TO ANIMAL DANDER: ICD-10-CM

## 2018-06-06 DIAGNOSIS — J30.89 ALLERGIC RHINITIS DUE TO MOLD: ICD-10-CM

## 2018-06-06 PROCEDURE — 99207 ZZC DROP WITH A PROCEDURE: CPT | Mod: 25 | Performed by: ALLERGY & IMMUNOLOGY

## 2018-06-06 PROCEDURE — 95180 RAPID DESENSITIZATION: CPT | Performed by: ALLERGY & IMMUNOLOGY

## 2018-06-06 RX ORDER — EPINEPHRINE 0.3 MG/.3ML
0.3 INJECTION SUBCUTANEOUS
Qty: 0.6 ML | Refills: 3 | Status: SHIPPED | OUTPATIENT
Start: 2018-06-06 | End: 2019-01-10

## 2018-06-06 NOTE — ASSESSMENT & PLAN NOTE
History of nasal and ocular symptoms for multiple years. Perennial with spring and fall worsening. Tried cetirizine and loratadine which were helpful. Currently used Allegra as needed. Allergy testing done in 2013 positive for dust mite, cat, dog, molds, trees, grasses and weeds. Tolerated cluster immunotherapy.       Skin testing  Positive for cat, dog, dust mites, weeds, trees and molds.       - Flonase 2 spray/nostril daily. Use spring and fall at the very least.   - Allegra or Zyrtec as needed and on allergy shot days.   - Continue allergen immunotherapy.

## 2018-06-06 NOTE — MR AVS SNAPSHOT
After Visit Summary   6/6/2018    Maddy Ortiz    MRN: [id number]           Patient Information     Date Of Birth          1985        Visit Information        Provider Department      6/6/2018 1:00 PM Magdy Blevins DO [facility]        Today's Diagnoses     Chronic seasonal allergic rhinitis due to pollen    -  1    Allergic rhinitis due to mold        Allergic rhinitis due to dust mite        Allergic rhinitis due to animal dander        Need for desensitization to allergens          Care Instructions    Allergy Staff Appt Hours Shot Hours Locations    Physician     Magdy Blevins DO       Support Staff     Nessa SIERRA RN      Neeru SIERRA, BRUCE  Monday:                      [place] 8-7     Tuesday:         [place] 8-5     Wednesday:        [place]: 7-5     Friday:        [place] 7-5   [place]        Monday: 9-5:50        Wednesday: 2-5:50        Friday: 7-12:50     [place]        Tuesday: 7-10:50        Thursday: 1:30-6:30        Friday: 8:00-3:50     [place]        Monday: 7:10-4:50        Tuesday: 12:30-6:30        Thursday: 7-11:50 [facility]  [address] [place] [address]  Appt Line: [phone]  Allergy RN (Monday):  [phone]    [facility]  [address] [place] [address]  Appt Line: [phone]  Allergy RN (Tues & Wed):  [phone]    [facility]  [address] [place] [address]  Appt Line: [phone]  Allergy RN (Friday):  [phone]       Important Scheduling Information  Aspirin Desensitization: Appt will last 2 clinic days. Please call the Allergy RN line for your clinic to schedule. Discontinue antihistamines 7 days prior to the appointment.     Food Challenges: Appt will last 3-4 hours. Please call the Allergy RN line for your clinic to schedule. Discontinue antihistamines 7 days prior to the appointment.     Penicillin Testing: Appt will last 2-3 hours. Please call the Allergy RN line for your  clinic to schedule. Discontinue antihistamines 7 days prior to the appointment.     Skin Testing: Appt will about 40 minutes. Call the appointment line for your clinic to schedule. Discontinue antihistamines 7 days prior to the appointment.     Venom Testing: Appt will last 2-3 hours. Please call the Allergy RN line for your clinic to schedule. Discontinue antihistamines 7 days prior to the appointment.     Thank you for trusting us with your Allergy, Asthma, and Immunology care. Please feel free to contact us with any questions or concerns you may have.                Follow-ups after your visit        Your next 10 appointments already scheduled     Jun 07, 2018  1:00 PM CDT   Return Visit with ADULT  DAY 4C [facility] ([facility])    [address] [place] [address]   [phone]            Jun 11, 2018  1:00 PM CDT   Return Visit with ADULT  DAY 4C [facility] ([facility])    [address] [place] [address]   [phone]            Jun 12, 2018  1:00 PM CDT   Return Visit with ADULT  DAY 4C [facility] ([facility])    [address] [place] [address]   [phone]            Jun 14, 2018  1:00 PM CDT   Return Visit with ADULT  DAY 4C [facility] ([facility])    [address] [place] [address]   [phone]            Jun 18, 2018  1:00 PM CDT   Return Visit with ADULT  DAY [address] [place] [address] [facility] ([facility])    [address] [place] [address]   [phone]            Jun 19, 2018  1:00 PM CDT   Return Visit with ADULT  DAY 4C [facility] ([facility])    [address] [address]   [phone]            Jun 20, 2018  1:00 PM CDT   Return Visit with Magdy Blevins DO   [facility] ([facility])    [address]  [facility] [address]   [phone]            Jun 27, 2018  1:00 PM CDT   Return Visit with Magdy Blevins DO   [facility] ([facility])    [address]  [facility] [address]   [phone]            Jul 02, 2018  9:00 AM CDT   (Arrive by 8:45 AM)   New Patient Visit with Maldonado Michele MD   [facility] ([facility])    [address]  [address] [facility] [address]   [phone]            Jul 11, 2018  1:00 PM CDT   Return Visit with Magdy Blevins DO   [facility] ([facility])    [address]  [facility] [address]   [phone]              Who to contact     If you have questions or need follow up information about today's clinic visit or your schedule please contact [facility] [place] directly at [phone].  Normal or non-critical lab and imaging results will be communicated to you by MyChart, letter or phone within 4 business days after the clinic has received the results. If you do not hear from us within 7 days, please contact the clinic through MyChart or phone. If you have a critical or abnormal lab result, we will notify you by phone as soon as possible.  Submit refill requests through [employer] or call your pharmacy and they will forward the refill request to us. Please allow 3 business days for your refill to be completed.          Additional Information About Your Visit        Care EveryWhere ID     This is your Care EveryWhere ID. This could be used by other organizations to access your [place] medical records  [id number]        Your Vitals Were     Pulse  Temperature Pulse Oximetry BMI (Body Mass Index)          90 98  F (36.7  C) (Oral) 96% 33.78 kg/m2         Blood Pressure from Last 3 Encounters:   06/06/18 106/68   05/30/18 120/68   05/24/18 (!) 136/92    Weight from Last 3 Encounters:   06/06/18 92.1 kg (203 lb)   05/30/18 90.7 kg (200 lb)   05/24/18 90.3 kg (199 lb)              We Performed the Following     RAPID DESENSITIZATION          Where to get your medicines      These medications [place] [facility] [place] [address]  [address], [facility] [address]     Phone:  [phone]     EPINEPHrine 0.3 MG/0.3ML injection 2-pack          Primary Care Provider Office Phone # Fax #    Shirley Andrade Elaina Freeman, [place] [employer] [phone] [address] [address]        Equal Access to Services     ARMOND MCKEON : Hadii aad ku hadasho Soomaali, waaxda luqadaha, qaybta kaalmada adeegyada, waxay idiin haygermania maciel . So [facility] [phone].    ATENCIÓN: Si habla español, tiene a titus disposición servicios gratuitos de asistencia lingüística. Yadira al [phone].    We comply with applicable federal civil rights laws and [place] laws. We do not discriminate on the basis of race, color, national origin, age, disability, sex, sexual orientation, or gender identity.            Thank you!     Thank you for choosing [facility]  for your care. Our goal is always to provide you with excellent care. Hearing back from our patients is one way we can continue to improve our services. Please take a few minutes to complete the written survey that you may receive in the mail after your visit with us. Thank you!             Your Updated Medication List - Protect others around you: Learn how to safely use, store and throw away your medicines at www.disposemymeds.org.          This list is accurate as of 6/6/18  2:44 PM.  Always use your most recent med list.                   Brand Name  Dispense Instructions for use Diagnosis    * ALLERGEN IMMUNOTHERAPY PRESCRIPTION     5 mL    Cat Hair, Standardized 10,000 BAU/mL, ALK  3.0 ml Dog Hair Dander, A. P.  1:100 w/v, HS  1.0 ml Dust Mites F 30,000AU/mL, HS  0.5 ml Dust Mites P. 30,000 AU/mL, HS  0.5 ml  Diluent: HSA qs to 5ml    Allergic rhinitis due to animal dander, Allergic rhinitis due to dust mite       * ALLERGEN IMMUNOTHERAPY PRESCRIPTION     5 mL    Alternaria Tenuis 1:10 w/v, HS  0.5 ml Epicoccum Nigrum 1:10 w/v, HS 0.5 ml Hormodendrum Cladosporioides 1:10 w/v, HS 0.5 ml Diluent: HSA qs to 5ml    Allergic rhinitis due to mold       * ALLERGEN IMMUNOTHERAPY PRESCRIPTION     5 mL    Estuardo, White  1:20 w/v, HS  0.5 ml Birch Mix PRW 1:20 w/v, HS  0.5 ml Elm, American 1:20 w/v, HS  0.5 ml Hackberry 1:20 w/v, HS 0.5 ml Hickory, Shagbark 1:20 w/v, HS  0.5 ml [place] Mix RW 1:20 w/v, HS 0.5 ml Oak Mix RVW 1:20 w/v, HS 0.5 ml [place] Tree, Black 1:20 w/v, HS 0.5 ml [place], Black 1:20 w/v, HS 0.5 ml Diluent: HSA qs to 5ml    Chronic seasonal allergic rhinitis due to pollen       * ALLERGEN IMMUNOTHERAPY PRESCRIPTION     5 mL    Kochia 1:20 w/v, HS 1.0 ml Nettle 1:20 w/v, HS 1.0 ml Plantain, English 1:20 w/v, HS 1.0 ml Ragweed Mixed 1:20 w/v ALK  0.6 ml Russian Thistle 1:20 w/v, HS 1.0 ml Diluent: HSA qs to 5ml    Chronic seasonal allergic rhinitis due to pollen       cetirizine 10 MG tablet    zyrTEC    60 tablet    Take 1 tablet (10 mg) by mouth 2 times daily    Chronic seasonal allergic rhinitis due to pollen       EPINEPHrine 0.3 MG/0.3ML injection 2-pack    EPIPEN 2-ODETTE    0.6 mL    Inject 0.3 mLs (0.3 mg) into the muscle once as needed for anaphylaxis    Need for desensitization to allergens       * haloperidol 0.5 MG tablet    HALDOL     Take 1 mg by mouth 2 times daily 3mg in the morning;  4 mg at night        * haloperidol 1 MG tablet    HALDOL     Take 7 mg by mouth daily        levonorgestrel 20 MCG/24HR IUD    MIRENA     1 each (20 mcg) by  Intrauterine route continuous    Encounter for insertion of mirena IUD       melatonin 3 MG tablet     30 tablet    Take 1 tablet (3 mg) by mouth At Bedtime    Insomnia, unspecified type       ranitidine 150 MG tablet    ZANTAC    60 tablet    Take 1 tablet (150 mg) by mouth 2 times daily        traZODone 50 MG tablet    DESYREL    30 tablet    Take 1 tablet (50 mg) by mouth At Bedtime    Insomnia, unspecified type       VENTOLIN  (90 Base) MCG/ACT Inhaler   Generic drug:  albuterol           * Notice:  This list has 6 medication(s) that are the same as other medications prescribed for you. Read the directions carefully, and ask your doctor or other care provider to review them with you.

## 2018-06-06 NOTE — PROGRESS NOTES
"Adult Mental Health Outpatient Group Therapy Progress Note     Client Initial Individualized Goals for Treatment: \"to prepare for a healthy lifestyle\"      See Initial Treatment suggestions for the client during the time between Diagnostic Assessment and completion of the Master Individualized Treatment Plan.    Treatment Goals:  1.Client will notify staff when needing assistance to develop or implement a coping plan to manage suicidal or self injurious urges.  2. When in self support skills group client will learn and practice 1-2 coping skills to help mange stress,decrease procrastination and make better personal decisions providing an update of progress weekly.     Area of Treatment Focus:  Symptom Management    Therapeutic Interventions/Treatment Strategies:  Support, Feedback, Safety Assessments, Structured Activity and Education    Response to Treatment Strategies:  Accepted Feedback, Listened, Attentive, Accepted Support and Alert    Name of Group: Life Skills (3:00-3:50)    Description and Outcome:  Client presented with calm,even mood with fair concentration and focus.Psychoeducation information presented included a discussion related to communication with a focus on listening skills /barriers.Thought process clear,goal directed and reality based.Goal #1 (no personal safety concerns reported or observed). Goal #2 Not addressed.  Client would benefit from additional opportunities to practice and implement content from this session in her every day life and mental health recovery.    Is this a Weekly Review of the Progress on the Treatment Plan?  Yes.      Are Treatment Plan Goals being addressed?  Yes, continue treatment goals      Are Treatment Plan Strategies to Address Goals Effective?  Yes, continue treatment strategies      Are there any current contracts in place?  No      "

## 2018-06-06 NOTE — LETTER
6/6/2018         RE: Maddy Ortiz  [address]  [facility] [address]        Dear Colleague,    Thank you for referring your patient, Maddy Ortiz, to the [facility]. Please see a copy of my visit note below.    Maddy Ortiz is a 32 year old  female with previous medical history significant for allergic rhinitis who returns for a follow up visit. Maddy Ortiz is being seen today for asthma and seasonal allergies.       Patient presents today for cluster immunotherapy. The patient is currently in a good state of health. No recent fevers, chills, cough, wheezing, shortness of breath, skin rash, angioedema, nausea, vomiting or diarrhea. The risks and benefits were discussed and the patient/patient's family wishes to proceed. The consent was signed.    Past Medical History:   Diagnosis Date     Bipolar 1 disorder (H) 12/6/2012     Depressive disorder      Paranoid type delusional disorder (H) 11/14/2012     Family History   Problem Relation Age of Onset     Adopted: Yes     Unknown/Adopted Mother      Unknown/Adopted Father      Unknown/Adopted Other      Past Surgical History:   Procedure Laterality Date     TONSILLECTOMY & ADENOIDECTOMY      as a child       REVIEW OF SYSTEMS:  General: negative for weight gain. negative for weight loss. negative for changes in sleep.   Ears: negative for fullness. negative for hearing loss. negative for dizziness.   Nose: negative for snoring.negative for changes in smell. negative for drainage.   Throat: negative for hoarseness. negative for sore throat. negative for trouble swallowing.   Lungs: negative for shortness of breath.negative for wheezing. negative for sputum production.   Cardiovascular: negative for chest pain. negative for swelling of ankles. negative for fast or irregular heartbeat.   Gastrointestinal: negative for nausea. negative for heartburn. negative for acid reflux.   Musculoskeletal: negative for joint pain. negative  for joint stiffness. negative for joint swelling.   Neurologic: negative for seizures. negative for fainting. negative for weakness.   Psychiatric: negative for changes in mood. negative for anxiety.   Endocrine: negative for cold intolerance. negative for heat intolerance. negative for tremors.   Hematologic: negative for easy bruising. negative for easy bleeding.  Integumentary: negative for rash. negative for scaling. negative for nail changes.       Current Outpatient Prescriptions:      cetirizine (ZYRTEC) 10 MG tablet, Take 1 tablet (10 mg) by mouth 2 times daily, Disp: 60 tablet, Rfl: 11     EPINEPHrine (EPIPEN 2-ODETTE) 0.3 MG/0.3ML injection 2-pack, Inject 0.3 mLs (0.3 mg) into the muscle once as needed for anaphylaxis, Disp: 0.6 mL, Rfl: 3     haloperidol (HALDOL) 0.5 MG tablet, Take 1 mg by mouth 2 times daily 3mg in the morning;  4 mg at night, Disp: , Rfl:      haloperidol (HALDOL) 1 MG tablet, Take 7 mg by mouth daily , Disp: , Rfl: 2     levonorgestrel (MIRENA) 20 MCG/24HR IUD, 1 each (20 mcg) by Intrauterine route continuous, Disp: , Rfl:      melatonin 3 MG tablet, Take 1 tablet (3 mg) by mouth At Bedtime, Disp: 30 tablet, Rfl: 0     ORDER FOR ALLERGEN IMMUNOTHERAPY, Cat Hair, Standardized 10,000 BAU/mL, ALK  3.0 ml Dog Hair Dander, A. P.  1:100 w/v, HS  1.0 ml Dust Mites F 30,000AU/mL, HS  0.5 ml Dust Mites P. 30,000 AU/mL, HS  0.5 ml  Diluent: HSA qs to 5ml, Disp: 5 mL, Rfl: PRN     ORDER FOR ALLERGEN IMMUNOTHERAPY, Alternaria Tenuis 1:10 w/v, HS  0.5 ml Epicoccum Nigrum 1:10 w/v, HS 0.5 ml Hormodendrum Cladosporioides 1:10 w/v, HS 0.5 ml Diluent: HSA qs to 5ml, Disp: 5 mL, Rfl: PRN     ORDER FOR ALLERGEN IMMUNOTHERAPY, Estuardo, White  1:20 w/v, HS  0.5 ml Birch Mix PRW 1:20 w/v, HS  0.5 ml Elm, American 1:20 w/v, HS  0.5 ml Hackberry 1:20 w/v, HS 0.5 ml Hickory, Shagbark 1:20 w/v, HS  0.5 ml [place] Mix RW 1:20 w/v, HS 0.5 ml Oak Mix RVW 1:20 w/v, HS 0.5 ml [place] Tree, Black 1:20 w/v, HS 0.5 ml  [place], Black 1:20 w/v, HS 0.5 ml Diluent: HSA qs to 5ml, Disp: 5 mL, Rfl: PRN     ORDER FOR ALLERGEN IMMUNOTHERAPY, Kochia 1:20 w/v, HS 1.0 ml Nettle 1:20 w/v, HS 1.0 ml Plantain, English 1:20 w/v, HS 1.0 ml Ragweed Mixed 1:20 w/v ALK  0.6 ml Russian Thistle 1:20 w/v, HS 1.0 ml Diluent: HSA qs to 5ml, Disp: 5 mL, Rfl: PRN     ranitidine (ZANTAC) 150 MG tablet, Take 1 tablet (150 mg) by mouth 2 times daily, Disp: 60 tablet, Rfl: 0     traZODone (DESYREL) 50 MG tablet, Take 1 tablet (50 mg) by mouth At Bedtime, Disp: 30 tablet, Rfl: 3     VENTOLIN  (90 Base) MCG/ACT Inhaler, , Disp: , Rfl: 2  Immunization History   Administered Date(s) Administered     Influenza (IIV3) PF 12/06/2012     Influenza Vaccine IM 3yrs+ 4 Valent IIV4 11/21/2017     TDAP Vaccine (Adacel) 12/06/2012     Allergies   Allergen Reactions     Animal Dander      Nkda [No Known Drug Allergies]      Seasonal Allergies      Weeds and mold         EXAM:   Constitutional:  Appears well-developed and well-nourished. No distress.   HEENT:   Head: Normocephalic.   Mouth/Throat: No oropharyngeal exudate present.   No cobblestoning of posterior oropharynx.   Nasal tissue pink and normal appearing.  No rhinorrhea noted.    Eyes: Conjunctivae are non-erythematous   Cardiovascular: Normal rate, regular rhythm and normal heart sounds. Exam reveals no gallop and no friction rub.   No murmur heard.  Respiratory: Effort normal and breath sounds normal. No respiratory distress. No wheezes. No rales.   Musculoskeletal: Normal range of motion.    Neuro: Oriented to person, place, and time.  Skin: Skin is warm and dry. No rash noted.   Psychiatric: Normal mood and affect.     Nursing note and vitals reviewed.      WORKUP:   Cluster immunotherapy   The patient received yellow 0.35, yellow 0.5. Each dose was  by 30 minutes. Full report will be scanned into electronic medical record. The patient was in office for over 1.0 hours. Large local reaction  involving left upper arm measuring at 50mm in size.     ASSESSMENT/PLAN:  Problem List Items Addressed This Visit        Respiratory    Seasonal allergic rhinitis due to pollen - Primary     History of nasal and ocular symptoms for multiple years. Perennial with spring and fall worsening. Tried cetirizine and loratadine which were helpful. Currently used Allegra as needed. Allergy testing done in 2013 positive for dust mite, cat, dog, molds, trees, grasses and weeds. Tolerated cluster immunotherapy.       Skin testing  Positive for cat, dog, dust mites, weeds, trees and molds.       - Flonase 2 spray/nostril daily. Use spring and fall at the very least.   - Allegra or Zyrtec as needed and on allergy shot days.   - Continue allergen immunotherapy.          Relevant Orders    RAPID DESENSITIZATION    Allergic rhinitis due to mold    Relevant Orders    RAPID DESENSITIZATION    Allergic rhinitis due to animal dander    Relevant Orders    RAPID DESENSITIZATION    Allergic rhinitis due to dust mite    Relevant Orders    RAPID DESENSITIZATION      Other Visit Diagnoses     Need for desensitization to allergens        Relevant Medications    EPINEPHrine (EPIPEN 2-ODETTE) 0.3 MG/0.3ML injection 2-pack          Chart documentation with Dragon Voice recognition Software. Although reviewed after completion, some words and grammatical errors may remain.    Magdy Blevins,    Allergy/Immunology  [facility], [place]      Again, thank you for allowing me to participate in the care of your patient.        Sincerely,        Magdy Blevins, DO

## 2018-06-06 NOTE — PATIENT INSTRUCTIONS
Allergy Staff Appt Hours Shot Hours Locations    Physician     Magdy Blevins DO       Support Staff     Nessa SIERRA, RN      Neeru SIERRA, [facility]  Monday:                      [place] 8-7     Tuesday:         [place] 8-5     Wednesday:        [place]: 7-5     Friday:        [place] 7-5   [place]        Monday: 9-5:50        Wednesday: 2-5:50        Friday: 7-12:50     [place]        Tuesday: 7-10:50        Thursday: 1:30-6:30        Friday: 8:00-3:50     [place] Monday: 7:10-4:50        Tuesday: 12:30-6:30        Thursday: 7-11:50 [facility]  [address] [place] [address]  Appt Line: [phone]  Allergy RN (Monday):  [phone]    [facility]  [address] [place] [address]  Appt Line: [phone]  Allergy RN (Tues & Wed):  [phone]    [facility]  [address] [place] [address]  Appt Line: [phone]  Allergy RN (Friday):  [phone]       Important Scheduling Information  Aspirin Desensitization: Appt will last 2 clinic days. Please call the Allergy RN line for your clinic to schedule. Discontinue antihistamines 7 days prior to the appointment.     Food Challenges: Appt will last 3-4 hours. Please call the Allergy RN line for your clinic to schedule. Discontinue antihistamines 7 days prior to the appointment.     Penicillin Testing: Appt will last 2-3 hours. Please call the Allergy RN line for your clinic to schedule. Discontinue antihistamines 7 days prior to the appointment.     Skin Testing: Appt will about 40 minutes. Call the appointment line for your clinic to schedule. Discontinue antihistamines 7 days prior to the appointment.     Venom Testing: Appt will last 2-3 hours. Please call the Allergy RN line for your clinic to schedule. Discontinue antihistamines 7 days prior to the appointment.     Thank you for trusting us with your Allergy, Asthma, and Immunology care. Please feel free to contact us with any questions or concerns you may  have.

## 2018-06-06 NOTE — PROGRESS NOTES
Maddy Ortiz is a 32 year old  female with previous medical history significant for allergic rhinitis who returns for a follow up visit. Maddy Ortiz is being seen today for asthma and seasonal allergies.       Patient presents today for cluster immunotherapy. The patient is currently in a good state of health. No recent fevers, chills, cough, wheezing, shortness of breath, skin rash, angioedema, nausea, vomiting or diarrhea. The risks and benefits were discussed and the patient/patient's family wishes to proceed. The consent was signed.    Past Medical History:   Diagnosis Date     Bipolar 1 disorder (H) 12/6/2012     Depressive disorder      Paranoid type delusional disorder (H) 11/14/2012     Family History   Problem Relation Age of Onset     Adopted: Yes     Unknown/Adopted Mother      Unknown/Adopted Father      Unknown/Adopted Other      Past Surgical History:   Procedure Laterality Date     TONSILLECTOMY & ADENOIDECTOMY      as a child       REVIEW OF SYSTEMS:  General: negative for weight gain. negative for weight loss. negative for changes in sleep.   Ears: negative for fullness. negative for hearing loss. negative for dizziness.   Nose: negative for snoring.negative for changes in smell. negative for drainage.   Throat: negative for hoarseness. negative for sore throat. negative for trouble swallowing.   Lungs: negative for shortness of breath.negative for wheezing. negative for sputum production.   Cardiovascular: negative for chest pain. negative for swelling of ankles. negative for fast or irregular heartbeat.   Gastrointestinal: negative for nausea. negative for heartburn. negative for acid reflux.   Musculoskeletal: negative for joint pain. negative for joint stiffness. negative for joint swelling.   Neurologic: negative for seizures. negative for fainting. negative for weakness.   Psychiatric: negative for changes in mood. negative for anxiety.   Endocrine: negative for cold intolerance.  negative for heat intolerance. negative for tremors.   Hematologic: negative for easy bruising. negative for easy bleeding.  Integumentary: negative for rash. negative for scaling. negative for nail changes.       Current Outpatient Prescriptions:      cetirizine (ZYRTEC) 10 MG tablet, Take 1 tablet (10 mg) by mouth 2 times daily, Disp: 60 tablet, Rfl: 11     EPINEPHrine (EPIPEN 2-ODETTE) 0.3 MG/0.3ML injection 2-pack, Inject 0.3 mLs (0.3 mg) into the muscle once as needed for anaphylaxis, Disp: 0.6 mL, Rfl: 3     haloperidol (HALDOL) 0.5 MG tablet, Take 1 mg by mouth 2 times daily 3mg in the morning;  4 mg at night, Disp: , Rfl:      haloperidol (HALDOL) 1 MG tablet, Take 7 mg by mouth daily , Disp: , Rfl: 2     levonorgestrel (MIRENA) 20 MCG/24HR IUD, 1 each (20 mcg) by Intrauterine route continuous, Disp: , Rfl:      melatonin 3 MG tablet, Take 1 tablet (3 mg) by mouth At Bedtime, Disp: 30 tablet, Rfl: 0     ORDER FOR ALLERGEN IMMUNOTHERAPY, Cat Hair, Standardized 10,000 BAU/mL, ALK  3.0 ml Dog Hair Dander, A. P.  1:100 w/v, HS  1.0 ml Dust Mites F 30,000AU/mL, HS  0.5 ml Dust Mites P. 30,000 AU/mL, HS  0.5 ml  Diluent: HSA qs to 5ml, Disp: 5 mL, Rfl: PRN     ORDER FOR ALLERGEN IMMUNOTHERAPY, Alternaria Tenuis 1:10 w/v, HS  0.5 ml Epicoccum Nigrum 1:10 w/v, HS 0.5 ml Hormodendrum Cladosporioides 1:10 w/v, HS 0.5 ml Diluent: HSA qs to 5ml, Disp: 5 mL, Rfl: PRN     ORDER FOR ALLERGEN IMMUNOTHERAPY, Estuardo, White  1:20 w/v, HS  0.5 ml Birch Mix PRW 1:20 w/v, HS  0.5 ml Elm, American 1:20 w/v, HS  0.5 ml Hackberry 1:20 w/v, HS 0.5 ml Hickory, Shagbark 1:20 w/v, HS  0.5 ml [place] Mix RW 1:20 w/v, HS 0.5 ml Oak Mix RVW 1:20 w/v, HS 0.5 ml Eugene Tree, Black 1:20 w/v, HS 0.5 ml [place]wood, Black 1:20 w/v, HS 0.5 ml Diluent: HSA qs to 5ml, Disp: 5 mL, Rfl: PRN     ORDER FOR ALLERGEN IMMUNOTHERAPY, Kochia 1:20 w/v, HS 1.0 ml Nettle 1:20 w/v, HS 1.0 ml Plantain, English 1:20 w/v, HS 1.0 ml Ragweed Mixed 1:20 w/v ALK  0.6 ml  Russian Thistle 1:20 w/v, HS 1.0 ml Diluent: HSA qs to 5ml, Disp: 5 mL, Rfl: PRN     ranitidine (ZANTAC) 150 MG tablet, Take 1 tablet (150 mg) by mouth 2 times daily, Disp: 60 tablet, Rfl: 0     traZODone (DESYREL) 50 MG tablet, Take 1 tablet (50 mg) by mouth At Bedtime, Disp: 30 tablet, Rfl: 3     VENTOLIN  (90 Base) MCG/ACT Inhaler, , Disp: , Rfl: 2  Immunization History   Administered Date(s) Administered     Influenza (IIV3) PF 12/06/2012     Influenza Vaccine IM 3yrs+ 4 Valent IIV4 11/21/2017     TDAP Vaccine (Adacel) 12/06/2012     Allergies   Allergen Reactions     Animal Dander      Nkda [No Known Drug Allergies]      Seasonal Allergies      Weeds and mold         EXAM:   Constitutional:  Appears well-developed and well-nourished. No distress.   HEENT:   Head: Normocephalic.   Mouth/Throat: No oropharyngeal exudate present.   No cobblestoning of posterior oropharynx.   Nasal tissue pink and normal appearing.  No rhinorrhea noted.    Eyes: Conjunctivae are non-erythematous   Cardiovascular: Normal rate, regular rhythm and normal heart sounds. Exam reveals no gallop and no friction rub.   No murmur heard.  Respiratory: Effort normal and breath sounds normal. No respiratory distress. No wheezes. No rales.   Musculoskeletal: Normal range of motion.    Neuro: Oriented to person, place, and time.  Skin: Skin is warm and dry. No rash noted.   Psychiatric: Normal mood and affect.     Nursing note and vitals reviewed.      WORKUP:   Cluster immunotherapy   The patient received yellow 0.35, yellow 0.5. Each dose was  by 30 minutes. Full report will be scanned into electronic medical record. The patient was in office for over 1.0 hours. Large local reaction involving left upper arm measuring at 50mm in size.     ASSESSMENT/PLAN:  Problem List Items Addressed This Visit        Respiratory    Seasonal allergic rhinitis due to pollen - Primary     History of nasal and ocular symptoms for multiple years.  Perennial with spring and fall worsening. Tried cetirizine and loratadine which were helpful. Currently used Allegra as needed. Allergy testing done in 2013 positive for dust mite, cat, dog, molds, trees, grasses and weeds. Tolerated cluster immunotherapy.       Skin testing  Positive for cat, dog, dust mites, weeds, trees and molds.       - Flonase 2 spray/nostril daily. Use spring and fall at the very least.   - Allegra or Zyrtec as needed and on allergy shot days.   - Continue allergen immunotherapy.          Relevant Orders    RAPID DESENSITIZATION    Allergic rhinitis due to mold    Relevant Orders    RAPID DESENSITIZATION    Allergic rhinitis due to animal dander    Relevant Orders    RAPID DESENSITIZATION    Allergic rhinitis due to dust mite    Relevant Orders    RAPID DESENSITIZATION      Other Visit Diagnoses     Need for desensitization to allergens        Relevant Medications    EPINEPHrine (EPIPEN 2-ODETTE) 0.3 MG/0.3ML injection 2-pack          Chart documentation with Dragon Voice recognition Software. Although reviewed after completion, some words and grammatical errors may remain.    Magdy Blevins,    Allergy/Immunology  [facility], [place] and [place], [place]

## 2018-06-07 ENCOUNTER — HOSPITAL ENCOUNTER (OUTPATIENT)
Dept: BEHAVIORAL HEALTH | Facility: CLINIC | Age: 33
End: 2018-06-07
Attending: PSYCHIATRY & NEUROLOGY
Payer: COMMERCIAL

## 2018-06-07 PROCEDURE — H2012 BEHAV HLTH DAY TREAT, PER HR: HCPCS

## 2018-06-07 NOTE — PROGRESS NOTES
"Adult Mental Health Outpatient Group Therapy Progress Note       Name of Group:  4C Group Therapy   Time:            2:00-2:50 pm  Group Therapy      Therapist:      Dariusz Spaulding, D,  L.P.          Client Initial Individualized Goals for Treatment:   \"to prepare for a healthy lifestyle\".        Diagnosis  295.70  (F25) Schizoaffective Disorder Bipolar Type      Treatment Goals:   1.  Personal Safety  Report any urges or thoughts to harm yourself to the Tx team.      2.  Self-Support Skills:  practice coping skills/strategies to help   3.  Group Therapy learn and practice 2 skills to manage the anxiety, depression and reduce negative thoughts.   4.  Community Resources: Find a support group near home, such as [facility]      Area of Treatment Focus:  Symptom Management  Personal Safety      Therapeutic Interventions/Treatment Strategies:  Support, Feedback, Safety Assessments, Structured Activity and Education      Response to Treatment Strategies:  Accepted Feedback, Listened, Attentive, Accepted Support and Alert      Name of Group:  4C Wellness and Group Therapy      Description and Therapeutic Outcome:          Mental Health Management                         The client reported being safe today. The group participated in a structured activity that involved reading about grounding skills to manage de-personalization and dissociation.   The group practiced an example of a mental grounding skill and named all things in the room that were green.   The group discussed the use of the skills in different situations with other members. The group discussed anxiety symptoms and their feelings of doom that may accompany it, and how to lower the distress level with the grounding skills. The group discussed self-soothing skills and physical focused skills.  The group formed a list of skills to use. The group made a small book to put their list of skills in for future reference.  The group shared their responses to each " questions.          The group participated in 10 minutes of mindfulness breathing.       Time:     2:00-2:50 pm  Group Therapy                  Maddy reported that she has been safe. She stated that she has problems with allergies and took a zyrtec.  She stated that she took her Klonopin last night and feels drowsy, and said she felt disoriented this morning, but feels better now.  She talked to the group about her boyfriend, who said she was aggressive.  The group asked what type of aggressive behavior, but she was unable to say.  The group said that she has been assertive, but didn't feel she was aggressive, during group therapy hours.  She reported that she wants to stay alert, since she has to  her daughter and doesn't want to forget anything.  She reported that she has an appointment with her psychiatrist and will talk about her medications. She reported that she took a bath this morning and felt very relaxed.    She practiced 10 minutes of mindfulness stretches with the group.      Client demonstrated understanding of session content by participating in the group therapy discussion, and sharing ideas with others in the group for ways to cope.   She stated that he has a Fertility appointment on 6/26, and another doctor appointment on 7/2 for her weight.  She offered suggestions to other group members.      Client will benefit from additional opportunities to practice and implement content from this session and receive feedback from the group.        Is this a Weekly Review of the Progress on the Treatment Plan?  Yes.        Are Treatment Plan Goals being addressed?  Yes, continue treatment goals          Are Treatment Plan Strategies to Address Goals Effective?  Yes, continue treatment strategies          Are there any current contracts in place?  No

## 2018-06-08 ENCOUNTER — TELEPHONE (OUTPATIENT)
Dept: BEHAVIORAL HEALTH | Facility: CLINIC | Age: 33
End: 2018-06-08

## 2018-06-08 ENCOUNTER — OFFICE VISIT (OUTPATIENT)
Dept: FAMILY MEDICINE | Facility: CLINIC | Age: 33
End: 2018-06-08
Payer: COMMERCIAL

## 2018-06-08 VITALS
OXYGEN SATURATION: 97 % | SYSTOLIC BLOOD PRESSURE: 114 MMHG | TEMPERATURE: 98.6 F | HEART RATE: 101 BPM | WEIGHT: 200 LBS | BODY MASS INDEX: 33.28 KG/M2 | DIASTOLIC BLOOD PRESSURE: 81 MMHG

## 2018-06-08 DIAGNOSIS — K21.9 GASTROESOPHAGEAL REFLUX DISEASE WITHOUT ESOPHAGITIS: Primary | ICD-10-CM

## 2018-06-08 PROCEDURE — 99213 OFFICE O/P EST LOW 20 MIN: CPT | Performed by: NURSE PRACTITIONER

## 2018-06-08 NOTE — MR AVS SNAPSHOT
After Visit Summary   6/8/2018    Maddy Ortiz    MRN: [id number]           Patient Information     Date Of Birth          1985        Visit Information        Provider Department      6/8/2018 1:00 PM Shirley Freeman APRN [facility]        Today's Diagnoses     Gastroesophageal reflux disease without esophagitis    -  1      Care Instructions    Continue ranitidine 150 mg twice daily    1. Avoid eating within 3-4 hours of bedtime.    2. Eat frequent small meals per day rather than large meals.    3. Avoid tobacco and alcohol products, avoid tight fitting clothes, elevate head of bed with six inch blocks.  4. Take antacids, like TUMS, for occasional heart burn.    5. Avoid NSAIDS (ibuprofen or naproxen), aspirin.  6. If overweight, weight loss is recommended. Losing even as little as 10 lbs may decrease symptoms.  7. Avoid high fat meals and other foods that aggravate the problem. Foods that may cause more symptoms are: chocolate, tomato-based foods, alcohol, peppermint, caffeinated products, citrus fruits and drinks, onions and garlic.            Thank you for choosing [facility].  You may be receiving a survey in the mail from [employer] regarding your visit today.  Please take a few minutes to complete and return the survey to let us know how we are doing.      If you have questions or concerns, please contact us via "[employer] or you can contact your care team at [phone].    Our Clinic hours are:  Monday 6:40 am  to 7:00 pm  Tuesday -Friday 6:40 am to 5:00 pm    The [place] outpatient lab hours are:  Monday - Friday 6:10 am to 4:45 pm  Saturdays 7:00 am to 11:00 am  Appointments are required, call [phone]    If you have clinical questions after hours or would like to schedule an appointment,  call the clinic at [phone].            Follow-ups after your visit        Your next 10 appointments already scheduled     Jun 11, 2018  1:00 PM STEV[place]    Return Visit with ADULT MH DAY 4C   [facility] ([facility])    [address] [address]   [phone]            Jun 12, 2018  1:00 PM CDT   Return Visit with ADULT MH DAY 4C   [facility] ([facility])    [address] [address]   [phone]            Jun 14, 2018  1:00 PM CDT   Return Visit with ADULT MH DAY 4C   [facility] ([facility])    [address] [address]   [phone]            Jun 18, 2018  1:00 PM CDT   Return Visit with ADULT MH DAY 4C   [facility] ([facility])    [facility] [address] [address]   [phone]            Jun 19, 2018  1:00 PM CDT   Return Visit with ADULT MH DAY 4C   [facility] ([facility])    [facility] [address] [address]   [phone]            Jun 20, 2018  1:00 PM CDT   Return Visit with DO [facility])    [address]  [facility] [address]   [phone]            Jun 27, 2018  1:00 PM CDT   Return Visit with DO [facility] ([facility])    [address]  [facility] [address]   [phone]            Jul 02, 2018  9:00 AM CDT   (Arrive by 8:45 AM)   New Patient Visit with Maldonado Michele MD   [facility] ([facility])    [address]  [facility] [address]   [phone]            Jul 11, 2018  1:00 PM CDT   Return Visit with DO [facility] ([facility])    [address]  [facility] [address]  [facility] [address] [phone]            Jul 18, 2018  1:00 PM CDT   [facility] Visit with Magdy Blevins,    [facility]    [address]  [facility] [address] [phone]              Who to contact     If you have questions or need follow up information about today's clinic visit or your schedule please contact [facility] directly at [phone].  Normal or non-critical lab and imaging results will be communicated to you by MyChart, letter or phone within 4 business days after the clinic has received the results. If you do not hear from us within 7 days, please contact the clinic through MyChart or phone. If you have a critical or abnormal lab result, we will notify you by phone as soon as possible.  Submit refill requests through [employer] or call your pharmacy and they will forward the refill request to us. Please allow 3 business days for your refill to be completed.          Additional Information About Your Visit        Care EveryWhere ID     This is your Care EveryWhere ID. This could be used by other organizations to access your [facility] medical records  [id number]        Your Vitals Were     Pulse Temperature Pulse Oximetry Breastfeeding? BMI (Body Mass Index)       101 98.6  F (37  C) (Tympanic) 97% No 33.28 kg/m2        Blood Pressure from Last 3 Encounters:   06/08/18 114/81   06/06/18 106/68   05/30/18 120/68    Weight from Last 3 Encounters:   06/08/18 200 lb (90.7 kg)   06/06/18 203 lb (92.1 kg)   05/30/18 200 lb (90.7 kg)              Today, you had the following     No orders found for display       Primary Care Provider Office Phone # Fax #    PHILL Rodriguez [phone] [address] [facility] [address]        Equal Access to Services     ARMOND MCKEON : Kamryn Ackerman, pancho rondon, [place] do  erica rashidcarmelita sandrafroylan la'aan ah. So [facility] [phone].    ATENCIÓN: Si elida west, tiene a titus disposición servicios gratuitos de asistencia lingüística. Yadira yuen [phone].    We comply with applicable federal civil rights laws and [place] laws. We do not discriminate on the basis of race, color, national origin, age, disability, sex, sexual orientation, or gender identity.            Thank you!     Thank you for choosing [facility]  for your care. Our goal is always to provide you with excellent care. Hearing back from our patients is one way we can continue to improve our services. Please take a few minutes to complete the written survey that you may receive in the mail after your visit with us. Thank you!             Your Updated Medication List - Protect others around you: Learn how to safely use, store and throw away your medicines at www.disposemymeds.org.          This list is accurate as of 6/8/18  1:26 PM.  Always use your most recent med list.                   Brand Name Dispense Instructions for use Diagnosis    * ALLERGEN IMMUNOTHERAPY PRESCRIPTION     5 mL    Cat Hair, Standardized 10,000 BAU/mL, ALK  3.0 ml Dog Hair Dander, A. P.  1:100 w/v, HS  1.0 ml Dust Mites F 30,000AU/mL, HS  0.5 ml Dust Mites P. 30,000 AU/mL, HS  0.5 ml  Diluent: HSA qs to 5ml    Allergic rhinitis due to animal dander, Allergic rhinitis due to dust mite       * ALLERGEN IMMUNOTHERAPY PRESCRIPTION     5 mL    Alternaria Tenuis 1:10 w/v, HS  0.5 ml Epicoccum Nigrum 1:10 w/v, HS 0.5 ml Hormodendrum Cladosporioides 1:10 w/v, HS 0.5 ml Diluent: HSA qs to 5ml    Allergic rhinitis due to mold       * ALLERGEN IMMUNOTHERAPY PRESCRIPTION     5 mL    Estuardo, White  1:20 w/v, HS  0.5 ml Birch Mix PRW 1:20 w/v, HS  0.5 ml Elm, American 1:20 w/v, HS  0.5 ml Hackberry 1:20 w/v, HS 0.5 ml Hickory, Shagbark 1:20 w/v, HS  0.5 ml [place] Mix RW 1:20 w/v, HS 0.5 ml Oak Mix RVW 1:20 w/v, HS 0.5 ml [place] Tree, Black 1:20 w/v, HS  0.5 ml Seym[place], Black 1:20 w/v, HS 0.5 ml Diluent: HSA qs to 5ml    Chronic seasonal allergic rhinitis due to pollen       * ALLERGEN IMMUNOTHERAPY PRESCRIPTION     5 mL    Kochia 1:20 w/v, HS 1.0 ml Nettle 1:20 w/v, HS 1.0 ml Plantain, English 1:20 w/v, HS 1.0 ml Ragweed Mixed 1:20 w/v ALK  0.6 ml Russian Thistle 1:20 w/v, HS 1.0 ml Diluent: HSA qs to 5ml    Chronic seasonal allergic rhinitis due to pollen       cetirizine 10 MG tablet    zyrTEC    60 tablet    Take 1 tablet (10 mg) by mouth 2 times daily    Chronic seasonal allergic rhinitis due to pollen       EPINEPHrine 0.3 MG/0.3ML injection 2-pack    EPIPEN 2-ODETTE    0.6 mL    Inject 0.3 mLs (0.3 mg) into the muscle once as needed for anaphylaxis    Need for desensitization to allergens       * haloperidol 0.5 MG tablet    HALDOL     Take 1 mg by mouth 2 times daily 3mg in the morning;  4 mg at night        * haloperidol 1 MG tablet    HALDOL     Take 7 mg by mouth daily        levonorgestrel 20 MCG/24HR IUD    MIRENA     1 each (20 mcg) by Intrauterine route continuous    Encounter for insertion of mirena IUD       melatonin 3 MG tablet     30 tablet    Take 1 tablet (3 mg) by mouth At Bedtime    Insomnia, unspecified type       ranitidine 150 MG tablet    ZANTAC    60 tablet    Take 1 tablet (150 mg) by mouth 2 times daily        traZODone 50 MG tablet    DESYREL    30 tablet    Take 1 tablet (50 mg) by mouth At Bedtime    Insomnia, unspecified type       VENTOLIN  (90 Base) MCG/ACT Inhaler   Generic drug:  albuterol           * Notice:  This list has 6 medication(s) that are the same as other medications prescribed for you. Read the directions carefully, and ask your doctor or other care provider to review them with you.

## 2018-06-08 NOTE — TELEPHONE ENCOUNTER
Phone call from Maddy to request a copy of her treatment plan  And assessment.  A message was left for her that she can get  Them next week, and that we have a review on Tuesday.    Jacek Spaulding., D,  L.P.

## 2018-06-08 NOTE — PROGRESS NOTES
"Adult Mental Health Outpatient Group Therapy Progress Note     Client Initial Individualized Goals for Treatment: \"to prepare for a healthy lifestyle\"      See Initial Treatment suggestions for the client during the time between Diagnostic Assessment and completion of the Master Individualized Treatment Plan.    Treatment Goals:  1.Client will notify staff when needing assistance to develop or implement a coping plan to manage suicidal or self injurious urges.  2. When in self support skills group client will learn and practice 1-2 coping skills to help mange stress,decrease procrastination and make better personal decisions providing an update of progress weekly.     Area of Treatment Focus:  Symptom Management    Therapeutic Interventions/Treatment Strategies:  Support, Feedback, Safety Assessments, Structured Activity and Education    Response to Treatment Strategies:  Accepted Feedback, Listened, Attentive, Accepted Support and Alert    Name of Group: Life Skills (3:00-3:50)    Description and Outcome:  Client presented with calm,even mood with fair concentration and focus.Psychoeducation information presented included a discussion related to self esteem and strategies for self improvement.Thought process clear,goal directed and reality based.Goal #1 (no personal safety concerns reported or observed). Goal #2 Not addressed.  Client would benefit from additional opportunities to practice and implement content from this session in her every day life and mental health recovery.    Is this a Weekly Review of the Progress on the Treatment Plan?  Yes.      Are Treatment Plan Goals being addressed?  Yes, continue treatment goals      Are Treatment Plan Strategies to Address Goals Effective?  Yes, continue treatment strategies      Are there any current contracts in place?  No      "

## 2018-06-08 NOTE — PATIENT INSTRUCTIONS
Continue ranitidine 150 mg twice daily    1. Avoid eating within 3-4 hours of bedtime.    2. Eat frequent small meals per day rather than large meals.    3. Avoid tobacco and alcohol products, avoid tight fitting clothes, elevate head of bed with six inch blocks.  4. Take antacids, like TUMS, for occasional heart burn.    5. Avoid NSAIDS (ibuprofen or naproxen), aspirin.  6. If overweight, weight loss is recommended. Losing even as little as 10 lbs may decrease symptoms.  7. Avoid high fat meals and other foods that aggravate the problem. Foods that may cause more symptoms are: chocolate, tomato-based foods, alcohol, peppermint, caffeinated products, citrus fruits and drinks, onions and garlic.            Thank you for choosing [facility].  You may be receiving a survey in the mail from Roma Longoria regarding your visit today.  Please take a few minutes to complete and return the survey to let us know how we are doing.      If you have questions or concerns, please contact us via [employer] or you can contact your care team at [phone].    Our Clinic hours are:  Monday 6:40 am  to 7:00 pm  Tuesday -Friday 6:40 am to 5:00 pm    The [place] outpatient lab hours are:  Monday - Friday 6:10 am to 4:45 pm  Saturdays 7:00 am to 11:00 am  Appointments are required, call [phone]    If you have clinical questions after hours or would like to schedule an appointment,  call the clinic at [phone].

## 2018-06-08 NOTE — PROGRESS NOTES
SUBJECTIVE:   Maddy Ortiz is a 32 year old female who presents to clinic today for the following health issues:      GERD/Heartburn  Onset: couple months    Description:     Burning in chest: YES    Intensity: moderate    Progression of Symptoms: worsening and constant    Accompanying Signs & Symptoms:  Does it feel like food gets stuck: no  Nausea: no  Vomiting (bloody?): no  Abdominal Pain: YES  Black-Tarry stools: no:  Bloody stools: no    History:   Previous ulcers: no    Precipitating factors:   Caffeine use: YES  Alcohol use: no  NSAID/Aspirin use: YES  Tobacco use: YES  Worse with spicy foods.  Has gained weight    Alleviating factors:  unsure    Therapies Tried and outcome: ranitidine twice daily is helpful.          Problem list and histories reviewed & adjusted, as indicated.  Additional history: as documented    Reviewed and updated as needed this visit by clinical staff       Reviewed and updated as needed this visit by Provider         ROS:  Constitutional, HEENT, cardiovascular, pulmonary, gi and gu systems are negative, except as otherwise noted.    OBJECTIVE:     /81 (BP Location: Right arm, Patient Position: Sitting, Cuff Size: Adult Large)  Pulse 101  Temp 98.6  F (37  C) (Tympanic)  Wt 200 lb (90.7 kg)  SpO2 97%  Breastfeeding? No  BMI 33.28 kg/m2  Body mass index is 33.28 kg/(m^2).  GENERAL: healthy, alert and no distress  RESP: lungs clear to auscultation - no rales, rhonchi or wheezes  CV: regular rate and rhythm, normal S1 S2, no S3 or S4, no murmur, click or rub, no peripheral edema and peripheral pulses strong  ABDOMEN: soft, nontender, no hepatosplenomegaly, no masses and bowel sounds normal    ASSESSMENT/PLAN:       ICD-10-CM    1. Gastroesophageal reflux disease without esophagitis K21.9 ranitidine (ZANTAC) 150 MG tablet     Improving on ranitidine, but still having some breakthrough symptoms.  Discussed lifestyle changes - see below.    Patient Instructions   Continue  ranitidine 150 mg twice daily    1. Avoid eating within 3-4 hours of bedtime.    2. Eat frequent small meals per day rather than large meals.    3. Avoid tobacco and alcohol products, avoid tight fitting clothes, elevate head of bed with six inch blocks.  4. Take antacids, like TUMS, for occasional heart burn.    5. Avoid NSAIDS (ibuprofen or naproxen), aspirin.  6. If overweight, weight loss is recommended. Losing even as little as 10 lbs may decrease symptoms.  7. Avoid high fat meals and other foods that aggravate the problem. Foods that may cause more symptoms are: chocolate, tomato-based foods, alcohol, peppermint, caffeinated products, citrus fruits and drinks, onions and garlic.      The risks, benefits and treatment options of prescribed medications or other treatments have been discussed with the patient. The patient verbalized their understanding and should call or follow up if no improvement or if they develop further problems.    PHILL Yan [facility]

## 2018-06-11 ENCOUNTER — TELEPHONE (OUTPATIENT)
Dept: BEHAVIORAL HEALTH | Facility: CLINIC | Age: 33
End: 2018-06-11

## 2018-06-11 NOTE — TELEPHONE ENCOUNTER
Phone call from Maddy, who said that she is feeling ill and  Won't be here today.    Nely Arthur, Jacek., D,  L.P.

## 2018-06-12 ENCOUNTER — TELEPHONE (OUTPATIENT)
Dept: BEHAVIORAL HEALTH | Facility: CLINIC | Age: 33
End: 2018-06-12

## 2018-06-12 NOTE — TELEPHONE ENCOUNTER
Phone call to Maddy and a message was left on her phone to check-in with staff.    Nely Arthur, Jacek., D,  L.P.  Adult Day Tx

## 2018-06-14 ENCOUNTER — TELEPHONE (OUTPATIENT)
Dept: BEHAVIORAL HEALTH | Facility: CLINIC | Age: 33
End: 2018-06-14

## 2018-06-14 NOTE — TELEPHONE ENCOUNTER
Phone call from Maddy, who said that she is ill and would not be at group today.      Nely Arthur, Jacek., D,  L.P.

## 2018-06-18 ENCOUNTER — HOSPITAL ENCOUNTER (OUTPATIENT)
Dept: BEHAVIORAL HEALTH | Facility: CLINIC | Age: 33
End: 2018-06-18
Attending: PSYCHIATRY & NEUROLOGY
Payer: COMMERCIAL

## 2018-06-18 PROCEDURE — H2012 BEHAV HLTH DAY TREAT, PER HR: HCPCS

## 2018-06-18 NOTE — PROGRESS NOTES
"Group Therapy Progress Notes       Client Initial Individualized Goals for Treatment:   \"to prepare for a healthy lifestyle\".        Diagnosis  295.70  (F25) Schizoaffective Disorder Bipolar Type      Treatment Goals:   1.  Personal Safety  Report any urges or thoughts to harm yourself to the Tx team.      2.  Self-Support Skills:  practice coping skills/strategies to help   3.  Group Therapy learn and practice 2 skills to manage the anxiety, depression and reduce negative thoughts.   4.  Community Resources: Find a support group near home, such as [facility]      Area of Treatment Focus:    Area of Treatment Focus:  Symptom Management and Personal Safety    Therapeutic Interventions/Treatment Strategies:  Support, Feedback, Safety Assessments, Structured Activity and Problem Solving    Response to Treatment Strategies:  Accepted Feedback, Gave Feedback, Listened, Focused on Goals and Attentive    Name of Group:  Group Psychotherapy 1:00-1:50PM    Progress Note  Maddy endorsed absence of suicidal ideation and self-injurious behavior urges. She described feeling \"very anxious\" and added that she believes that something bad will happen. She shared that in order to reduce her anxiety she has been consistently taking her medications, listening to Beethoven, and practicing deep breathing/guided meditations. Writer offered Maddy validation regarding distress of anxiety. Writer assisted Maddy in identifying other ways she can practice reducing anxiety including physical activity, reading, and watching television to distract herself. Maddy was receptive to feedback. Writer encouraged Maddy to talk to nurse on staff about whether she should contact her psychiatrist before next appointment on July 5, 2018.     Yamilet Perkins, [facility], [facility]  6/18/2018      Is this a Weekly Review of the Progress on the Treatment Plan?  No     "

## 2018-06-19 NOTE — PROGRESS NOTES
"Adult Mental Health Outpatient Group Therapy Progress Note     Client Initial Individualized Goals for Treatment: \"to prepare for a healthy lifestyle\"      See Initial Treatment suggestions for the client during the time between Diagnostic Assessment and completion of the Master Individualized Treatment Plan.    Treatment Goals:  1.Client will notify staff when needing assistance to develop or implement a coping plan to manage suicidal or self injurious urges.  2. When in self support skills group client will learn and practice 1-2 coping skills to help mange stress,decrease procrastination and make better personal decisions providing an update of progress weekly.  3.  Group Therapy learn and practice 2 skills to manage the anxiety, depression and reduce negative thoughts.   4.  Community Resources: Find a support group near home, such as [facility]    Area of Treatment Focus:  Symptom Management    Therapeutic Interventions/Treatment Strategies:  Support, Feedback, Safety Assessments, Structured Activity and Education    Response to Treatment Strategies:  Accepted Feedback, Listened, Attentive, Accepted Support and Alert    Name of Group: Life Skills (3:00-3:50)    Description and Outcome:  Client presented with calm,even mood with fair concentration and focus.Psychoeducation information presented included a discussion related to stress management and resiliency skills.Thought process clear,goal directed and reality based.Goal #1 (no personal safety concerns reported or observed). Goal #2 Not addressed.  Client would benefit from additional opportunities to practice and implement content from this session in her every day life and mental health recovery.    Is this a Weekly Review of the Progress on the Treatment Plan?  Yes.      Are Treatment Plan Goals being addressed?  Yes, continue treatment goals      Are Treatment Plan Strategies to Address Goals Effective?  Yes, continue treatment strategies      Are there any " current contracts in place?  No

## 2018-06-20 ENCOUNTER — OFFICE VISIT (OUTPATIENT)
Dept: ALLERGY | Facility: OTHER | Age: 33
End: 2018-06-20
Payer: COMMERCIAL

## 2018-06-20 VITALS
OXYGEN SATURATION: 95 % | RESPIRATION RATE: 20 BRPM | WEIGHT: 197 LBS | DIASTOLIC BLOOD PRESSURE: 80 MMHG | TEMPERATURE: 98.7 F | BODY MASS INDEX: 32.78 KG/M2 | HEART RATE: 102 BPM | SYSTOLIC BLOOD PRESSURE: 128 MMHG

## 2018-06-20 DIAGNOSIS — J30.89 ALLERGIC RHINITIS DUE TO MOLD: ICD-10-CM

## 2018-06-20 DIAGNOSIS — J30.1 CHRONIC SEASONAL ALLERGIC RHINITIS DUE TO POLLEN: Primary | ICD-10-CM

## 2018-06-20 DIAGNOSIS — J30.89 ALLERGIC RHINITIS DUE TO DUST MITE: ICD-10-CM

## 2018-06-20 DIAGNOSIS — J30.81 ALLERGIC RHINITIS DUE TO ANIMAL DANDER: ICD-10-CM

## 2018-06-20 PROCEDURE — 99207 ZZC DROP WITH A PROCEDURE: CPT | Mod: 25 | Performed by: ALLERGY & IMMUNOLOGY

## 2018-06-20 PROCEDURE — 95180 RAPID DESENSITIZATION: CPT | Performed by: ALLERGY & IMMUNOLOGY

## 2018-06-20 RX ORDER — HYDROXYZINE HYDROCHLORIDE 25 MG/1
TABLET, FILM COATED ORAL
Refills: 2 | Status: ON HOLD | COMMUNITY
Start: 2018-05-29 | End: 2018-08-24

## 2018-06-20 NOTE — MR AVS SNAPSHOT
After Visit Summary   6/20/2018    Maddy Ortiz    MRN: [id number]           Patient Information     Date Of Birth          1985        Visit Information        Provider Department      6/20/2018 1:00 PM Magdy Blevins DO [facility]        Today's Diagnoses     Chronic seasonal allergic rhinitis due to pollen    -  1    Allergic rhinitis due to mold        Allergic rhinitis due to dust mite        Allergic rhinitis due to animal dander          Care Instructions    Allergy Staff Appt Hours Shot Hours Locations    Physician     Magdy Blevins DO       Support Staff     Nessa SIERRA RN      Neeru SIERRA, [facility]  Monday:                      [place] 8-7     Tuesday:         [place] 8-5     Wednesday:        [place]: 7-5     Friday:        [place] 7-5   [place]        Monday: 9-5:50        Wednesday: 2-5:50        Friday: 7-12:50     [place]        Tuesday: 7-10:50        Thursday: 1:30-6:30        Friday: 8:00-3:50     [place]        Monday: 7:10-4:50        Tuesday: 12:30-6:30        Thursday: 7-11:50 [facility]  [address] [place] [address]  Appt Line: [phone]  Allergy RN (Monday):  [phone]    [facility]  [address] [place] [address]  Appt Line: [phone]  Allergy RN (Tues & Wed):  [phone]    [facility]  [address] [place] [address]  Appt Line: [phone]  Allergy RN (Friday):  [phone]       Important Scheduling Information  Aspirin Desensitization: Appt will last 2 clinic days. Please call the Allergy RN line for your clinic to schedule. Discontinue antihistamines 7 days prior to the appointment.     Food Challenges: Appt will last 3-4 hours. Please call the Allergy RN line for your clinic to schedule. Discontinue antihistamines 7 days prior to the appointment.     Penicillin Testing: Appt will last 2-3 hours. Please call the Allergy RN line for your clinic to schedule. Discontinue antihistamines 7  days prior to the appointment.     Skin Testing: Appt will about 40 minutes. Call the appointment line for your clinic to schedule. Discontinue antihistamines 7 days prior to the appointment.     Venom Testing: Appt will last 2-3 hours. Please call the Allergy RN line for your clinic to schedule. Discontinue antihistamines 7 days prior to the appointment.     Thank you for trusting us with your Allergy, Asthma, and Immunology care. Please feel free to contact us with any questions or concerns you may have.                Follow-ups after your visit        Your next 10 appointments already scheduled     Jun 27, 2018  1:00 PM CDT   [facility]ins [facility]   [facility]    [address]  [facility] [facility] [address]   [phone]            Jul 02, 2018  9:00 AM CDT   (Arrive by 8:45 AM)   New Patient Visit with Maldonado Michele MD   [facility] ([facility])    [address]  [address]  [place] [facility] [address] [phone]            Jul 11, 2018  1:00 PM CDT   Return Visit with Magdy Blevins DO   [facility]    [address]  [place] [address]   [phone]            Jul 18, 2018  1:00 PM CDT   Nurse Only with ALLERGY RN - [facility] ([facility])    [address] [address] [phone]              Who to contact     If you have questions or need follow up information about today's clinic visit or your schedule please contact [facility] directly at [phone].  Normal or non-critical lab and imaging results will be communicated to you by MyChart, letter or phone within 4 business days after the clinic has received the results. If you do not hear from us within 7 days, please contact the clinic through MyChart or phone. If you have a  critical or abnormal lab result, we will notify you by phone as soon as possible.  Submit refill requests through [employer] or call your pharmacy and they will forward the refill request to us. Please allow 3 business days for your refill to be completed.          Additional Information About Your Visit        Care EveryWhere ID     This is your Care EveryWhere ID. This could be used by other organizations to access your [facility] medical records  [id number]        Your Vitals Were     Pulse Temperature Respirations Pulse Oximetry BMI (Body Mass Index)       102 98.7  F (37.1  C) (Oral) 20 95% 32.78 kg/m2        Blood Pressure from Last 3 Encounters:   06/20/18 128/80   06/08/18 114/81   06/06/18 106/68    Weight from Last 3 Encounters:   06/20/18 89.4 kg (197 lb)   06/08/18 90.7 kg (200 lb)   06/06/18 92.1 kg (203 lb)              We Performed the Following     RAPID DESENSITIZATION        Primary Care Provider Office Phone # Fax #    Shirley Andrade PHILL [facility] [phone] [address] [address]        Equal Access to Services     BRIGID MCKEON : Hadii aad ku hadasho Sohubertali, waaxda luqadaha, qaybta kaalmada adeegyada, lincoln maciel . So [facility] [phone].    ATENCIÓN: Si habla español, tiene a titus disposición servicios gratuitos de asistencia lingüística. Llame al [phone].    We comply with applicable federal civil rights laws and [place] laws. We do not discriminate on the basis of race, color, national origin, age, disability, sex, sexual orientation, or gender identity.            Thank you!     Thank you for choosing [facility]  for your care. Our goal is always to provide you with excellent care. Hearing back from our patients is one way we can continue to improve our services. Please take a few minutes to complete the written survey that you may receive in the mail after your visit with us. Thank you!              Your Updated Medication List - Protect others around you: Learn how to safely use, store and throw away your medicines at www.disposemymeds.org.          This list is accurate as of 6/20/18  2:34 PM.  Always use your most recent med list.                   Brand Name Dispense Instructions for use Diagnosis    * ALLERGEN IMMUNOTHERAPY PRESCRIPTION     5 mL    Cat Hair, Standardized 10,000 BAU/mL, ALK  3.0 ml Dog Hair Dander, A. P.  1:100 w/v, HS  1.0 ml Dust Mites F 30,000AU/mL, HS  0.5 ml Dust Mites P. 30,000 AU/mL, HS  0.5 ml  Diluent: HSA qs to 5ml    Allergic rhinitis due to animal dander, Allergic rhinitis due to dust mite       * ALLERGEN IMMUNOTHERAPY PRESCRIPTION     5 mL    Alternaria Tenuis 1:10 w/v, HS  0.5 ml Epicoccum Nigrum 1:10 w/v, HS 0.5 ml Hormodendrum Cladosporioides 1:10 w/v, HS 0.5 ml Diluent: HSA qs to 5ml    Allergic rhinitis due to mold       * ALLERGEN IMMUNOTHERAPY PRESCRIPTION     5 mL    Estuardo, White  1:20 w/v, HS  0.5 ml Birch Mix PRW 1:20 w/v, HS  0.5 ml Elm, American 1:20 w/v, HS  0.5 ml Hackberry 1:20 w/v, HS 0.5 ml Hickory, Shagbark 1:20 w/v, HS  0.5 ml [place] Mix RW 1:20 w/v, HS 0.5 ml Oak Mix RVW 1:20 w/v, HS 0.5 ml [place] Tree, Black 1:20 w/v, HS 0.5 ml [employer], Black 1:20 w/v, HS 0.5 ml Diluent: HSA qs to 5ml    Chronic seasonal allergic rhinitis due to pollen       * ALLERGEN IMMUNOTHERAPY PRESCRIPTION     5 mL    Kochia 1:20 w/v, HS 1.0 ml Nettle 1:20 w/v, HS 1.0 ml Plantain, English 1:20 w/v, HS 1.0 ml Ragweed Mixed 1:20 w/v ALK  0.6 ml Russian Thistle 1:20 w/v, HS 1.0 ml Diluent: HSA qs to 5ml    Chronic seasonal allergic rhinitis due to pollen       cetirizine 10 MG tablet    zyrTEC    60 tablet    Take 1 tablet (10 mg) by mouth 2 times daily    Chronic seasonal allergic rhinitis due to pollen       EPINEPHrine 0.3 MG/0.3ML injection 2-pack    EPIPEN 2-ODETTE    0.6 mL    Inject 0.3 mLs (0.3 mg) into the muscle once as needed for anaphylaxis    Need for desensitization to  allergens       * haloperidol 0.5 MG tablet    HALDOL     Take 1 mg by mouth 2 times daily 3mg in the morning;  4 mg at night        * haloperidol 1 MG tablet    HALDOL     Take 7 mg by mouth daily        hydrOXYzine 25 MG tablet    ATARAX          levonorgestrel 20 MCG/24HR IUD    MIRENA     1 each (20 mcg) by Intrauterine route continuous    Encounter for insertion of mirena IUD       melatonin 3 MG tablet     30 tablet    Take 1 tablet (3 mg) by mouth At Bedtime    Insomnia, unspecified type       ranitidine 150 MG tablet    ZANTAC    60 tablet    Take 1 tablet (150 mg) by mouth 2 times daily    Gastroesophageal reflux disease without esophagitis       traZODone 50 MG tablet    DESYREL    30 tablet    Take 1 tablet (50 mg) by mouth At Bedtime    Insomnia, unspecified type       VENTOLIN  (90 Base) MCG/ACT Inhaler   Generic drug:  albuterol           * Notice:  This list has 6 medication(s) that are the same as other medications prescribed for you. Read the directions carefully, and ask your doctor or other care provider to review them with you.

## 2018-06-20 NOTE — PATIENT INSTRUCTIONS
Allergy Staff Appt Hours Shot Hours Locations    Physician     Magdy Blevins DO       Support Staff     Nessa SIERRA, RN      Neeru SIERRA, [facility]  Monday:                      [place] 8-7     Tuesday:         [place] 8-5     Wednesday:        [place]: 7-5     Friday:        [place] 7-5   [place]        Monday: 9-5:50        Wednesday: 2-5:50        Friday: 7-12:50     [place]        Tuesday: 7-10:50        Thursday: 1:30-6:30        Friday: 8:00-3:50     [place] Monday: 7:10-4:50        Tuesday: 12:30-6:30        Thursday: 7-11:50 [facility]  [address] [place] [address]  Appt Line: [phone]  Allergy RN (Monday):  [phone]    [facility]  [address] [place] [address]  Appt Line: [phone]  Allergy RN (Tues & Wed):  [phone]    [facility]  [address] [place] [address]  Appt Line: [phone]  Allergy RN (Friday):  [phone]       Important Scheduling Information  Aspirin Desensitization: Appt will last 2 clinic days. Please call the Allergy RN line for your clinic to schedule. Discontinue antihistamines 7 days prior to the appointment.     Food Challenges: Appt will last 3-4 hours. Please call the Allergy RN line for your clinic to schedule. Discontinue antihistamines 7 days prior to the appointment.     Penicillin Testing: Appt will last 2-3 hours. Please call the Allergy RN line for your clinic to schedule. Discontinue antihistamines 7 days prior to the appointment.     Skin Testing: Appt will about 40 minutes. Call the appointment line for your clinic to schedule. Discontinue antihistamines 7 days prior to the appointment.     Venom Testing: Appt will last 2-3 hours. Please call the Allergy RN line for your clinic to schedule. Discontinue antihistamines 7 days prior to the appointment.     Thank you for trusting us with your Allergy, Asthma, and Immunology care. Please feel free to contact us with any questions or concerns you may  have.

## 2018-06-20 NOTE — LETTER
6/20/2018         RE: Maddy Ortiz  [address]  [facility] [address]        Dear Colleague,    Thank you for referring your patient, Maddy Ortiz, to the [facility]. Please see a copy of my visit note below.    Maddy Ortiz is a 32 year old  female with previous medical history significant for allergic rhinitis who returns for a follow up visit. Maddy Ortiz is being seen today for asthma and seasonal allergies.       Patient presents today for cluster immunotherapy. The patient is currently in a good state of health. No recent fevers, chills, cough, wheezing, shortness of breath, skin rash, angioedema, nausea, vomiting or diarrhea. She has had some recent nasal congestion. Mild URI.  The risks and benefits were discussed and the patient/patient's family wishes to proceed. The consent was signed.      Past Medical History:   Diagnosis Date     Bipolar 1 disorder (H) 12/6/2012     Depressive disorder      Paranoid type delusional disorder (H) 11/14/2012     Family History   Problem Relation Age of Onset     Adopted: Yes     Unknown/Adopted Mother      Unknown/Adopted Father      Unknown/Adopted Other      Past Surgical History:   Procedure Laterality Date     TONSILLECTOMY & ADENOIDECTOMY      as a child       REVIEW OF SYSTEMS:  General: negative for weight gain. negative for weight loss. negative for changes in sleep.   Ears: negative for fullness. negative for hearing loss. negative for dizziness.   Nose: negative for snoring.negative for changes in smell. negative for drainage.   Throat: negative for hoarseness. negative for sore throat. negative for trouble swallowing.   Lungs: negative for shortness of breath.negative for wheezing. negative for sputum production.   Cardiovascular: negative for chest pain. negative for swelling of ankles. negative for fast or irregular heartbeat.   Gastrointestinal: negative for nausea. negative for heartburn. negative for acid  reflux.   Musculoskeletal: negative for joint pain. negative for joint stiffness. negative for joint swelling.   Neurologic: negative for seizures. negative for fainting. negative for weakness.   Psychiatric: negative for changes in mood. negative for anxiety.   Endocrine: negative for cold intolerance. negative for heat intolerance. negative for tremors.   Hematologic: negative for easy bruising. negative for easy bleeding.  Integumentary: negative for rash. negative for scaling. negative for nail changes.       Current Outpatient Prescriptions:      cetirizine (ZYRTEC) 10 MG tablet, Take 1 tablet (10 mg) by mouth 2 times daily, Disp: 60 tablet, Rfl: 11     EPINEPHrine (EPIPEN 2-ODETTE) 0.3 MG/0.3ML injection 2-pack, Inject 0.3 mLs (0.3 mg) into the muscle once as needed for anaphylaxis, Disp: 0.6 mL, Rfl: 3     haloperidol (HALDOL) 0.5 MG tablet, Take 1 mg by mouth 2 times daily 3mg in the morning;  4 mg at night, Disp: , Rfl:      haloperidol (HALDOL) 1 MG tablet, Take 7 mg by mouth daily , Disp: , Rfl: 2     hydrOXYzine (ATARAX) 25 MG tablet, , Disp: , Rfl: 2     levonorgestrel (MIRENA) 20 MCG/24HR IUD, 1 each (20 mcg) by Intrauterine route continuous, Disp: , Rfl:      melatonin 3 MG tablet, Take 1 tablet (3 mg) by mouth At Bedtime, Disp: 30 tablet, Rfl: 0     ORDER FOR ALLERGEN IMMUNOTHERAPY, Cat Hair, Standardized 10,000 BAU/mL, ALK  3.0 ml Dog Hair Dander, A. P.  1:100 w/v, HS  1.0 ml Dust Mites F 30,000AU/mL, HS  0.5 ml Dust Mites P. 30,000 AU/mL, HS  0.5 ml  Diluent: HSA qs to 5ml, Disp: 5 mL, Rfl: PRN     ORDER FOR ALLERGEN IMMUNOTHERAPY, Alternaria Tenuis 1:10 w/v, HS  0.5 ml Epicoccum Nigrum 1:10 w/v, HS 0.5 ml Hormodendrum Cladosporioides 1:10 w/v, HS 0.5 ml Diluent: HSA qs to 5ml, Disp: 5 mL, Rfl: PRN     ORDER FOR ALLERGEN IMMUNOTHERAPY, Estuardo, White  1:20 w/v, HS  0.5 ml Birch Mix PRW 1:20 w/v, HS  0.5 ml Elm, American 1:20 w/v, HS  0.5 ml Hackberry 1:20 w/v, HS 0.5 ml Hickory, Shagbark 1:20 w/v, HS  0.5  ml [place] Mix RW 1:20 w/v, HS 0.5 ml Oak Mix RVW 1:20 w/v, HS 0.5 ml [place] Tree, Black 1:20 w/v, HS 0.5 ml [place], Black 1:20 w/v, HS 0.5 ml Diluent: HSA qs to 5ml, Disp: 5 mL, Rfl: PRN     ORDER FOR ALLERGEN IMMUNOTHERAPY, Kochia 1:20 w/v, HS 1.0 ml Nettle 1:20 w/v, HS 1.0 ml Plantain, English 1:20 w/v, HS 1.0 ml Ragweed Mixed 1:20 w/v ALK  0.6 ml Russian Thistle 1:20 w/v, HS 1.0 ml Diluent: HSA qs to 5ml, Disp: 5 mL, Rfl: PRN     ranitidine (ZANTAC) 150 MG tablet, Take 1 tablet (150 mg) by mouth 2 times daily, Disp: 60 tablet, Rfl: 1     traZODone (DESYREL) 50 MG tablet, Take 1 tablet (50 mg) by mouth At Bedtime, Disp: 30 tablet, Rfl: 3     VENTOLIN  (90 Base) MCG/ACT Inhaler, , Disp: , Rfl: 2  Immunization History   Administered Date(s) Administered     Influenza (IIV3) PF 12/06/2012     Influenza Vaccine IM 3yrs+ 4 Valent IIV4 11/21/2017     TDAP Vaccine (Adacel) 12/06/2012     Allergies   Allergen Reactions     Animal Dander      Nkda [No Known Drug Allergies]      Seasonal Allergies      Weeds and mold         EXAM:   Constitutional:  Appears well-developed and well-nourished. No distress.   HEENT:   Head: Normocephalic.   Mouth/Throat:  No cobblestoning of posterior oropharynx.   Nasal tissue pink and normal appearing.  Clear rhinorrhea noted.    Eyes: Conjunctivae are non-erythematous   No maxillary or frontal sinus tenderness to palpation.   Cardiovascular: Normal rate, regular rhythm and normal heart sounds. Exam reveals no gallop and no friction rub.   No murmur heard.  Respiratory: Effort normal and breath sounds normal. No respiratory distress. No wheezes. No rales.   Musculoskeletal: Normal range of motion.   Neuro: Oriented to person, place, and time.  Skin: Skin is warm and dry. No rash noted.   Psychiatric: Normal mood and affect.     Nursing note and vitals reviewed.      WORKUP: Cluster immunotherapy   The patient received red 0.05, red 0.10. Each dose was  by 30 minutes. Full  report will be scanned into electronic medical record. The patient was in office for over 1.0 hours.      ASSESSMENT/PLAN:  Problem List Items Addressed This Visit        Respiratory    Seasonal allergic rhinitis due to pollen - Primary     History of nasal and ocular symptoms for multiple years. Perennial with spring and fall worsening. Tried cetirizine and loratadine which were helpful. Currently used Allegra as needed. Allergy testing done in 2013 positive for dust mite, cat, dog, molds, trees, grasses and weeds. Tolerated cluster immunotherapy.       Skin testing  Positive for cat, dog, dust mites, weeds, trees and molds.       - Flonase 2 spray/nostril daily. Use spring and fall at the very least.   - Allegra or Zyrtec as needed and on allergy shot days.   - Continue allergen immunotherapy.          Relevant Orders    RAPID DESENSITIZATION (Completed)    Allergic rhinitis due to mold    Relevant Orders    RAPID DESENSITIZATION (Completed)    Allergic rhinitis due to animal dander    Relevant Orders    RAPID DESENSITIZATION (Completed)    Allergic rhinitis due to dust mite    Relevant Orders    RAPID DESENSITIZATION (Completed)          Chart documentation with Dragon Voice recognition Software. Although reviewed after completion, some words and grammatical errors may remain.    Magdy Blevins,    Allergy/Immunology  [facility], [place]      Again, thank you for allowing me to participate in the care of your patient.        Sincerely,        Magdy Blevins, DO

## 2018-06-20 NOTE — PROGRESS NOTES
Maddy Ortiz is a 32 year old  female with previous medical history significant for allergic rhinitis who returns for a follow up visit. Maddy Ortiz is being seen today for asthma and seasonal allergies.       Patient presents today for cluster immunotherapy. The patient is currently in a good state of health. No recent fevers, chills, cough, wheezing, shortness of breath, skin rash, angioedema, nausea, vomiting or diarrhea. She has had some recent nasal congestion. Mild URI.  The risks and benefits were discussed and the patient/patient's family wishes to proceed. The consent was signed.      Past Medical History:   Diagnosis Date     Bipolar 1 disorder (H) 12/6/2012     Depressive disorder      Paranoid type delusional disorder (H) 11/14/2012     Family History   Problem Relation Age of Onset     Adopted: Yes     Unknown/Adopted Mother      Unknown/Adopted Father      Unknown/Adopted Other      Past Surgical History:   Procedure Laterality Date     TONSILLECTOMY & ADENOIDECTOMY      as a child       REVIEW OF SYSTEMS:  General: negative for weight gain. negative for weight loss. negative for changes in sleep.   Ears: negative for fullness. negative for hearing loss. negative for dizziness.   Nose: negative for snoring.negative for changes in smell. negative for drainage.   Throat: negative for hoarseness. negative for sore throat. negative for trouble swallowing.   Lungs: negative for shortness of breath.negative for wheezing. negative for sputum production.   Cardiovascular: negative for chest pain. negative for swelling of ankles. negative for fast or irregular heartbeat.   Gastrointestinal: negative for nausea. negative for heartburn. negative for acid reflux.   Musculoskeletal: negative for joint pain. negative for joint stiffness. negative for joint swelling.   Neurologic: negative for seizures. negative for fainting. negative for weakness.   Psychiatric: negative for changes in mood. negative for  anxiety.   Endocrine: negative for cold intolerance. negative for heat intolerance. negative for tremors.   Hematologic: negative for easy bruising. negative for easy bleeding.  Integumentary: negative for rash. negative for scaling. negative for nail changes.       Current Outpatient Prescriptions:      cetirizine (ZYRTEC) 10 MG tablet, Take 1 tablet (10 mg) by mouth 2 times daily, Disp: 60 tablet, Rfl: 11     EPINEPHrine (EPIPEN 2-ODETTE) 0.3 MG/0.3ML injection 2-pack, Inject 0.3 mLs (0.3 mg) into the muscle once as needed for anaphylaxis, Disp: 0.6 mL, Rfl: 3     haloperidol (HALDOL) 0.5 MG tablet, Take 1 mg by mouth 2 times daily 3mg in the morning;  4 mg at night, Disp: , Rfl:      haloperidol (HALDOL) 1 MG tablet, Take 7 mg by mouth daily , Disp: , Rfl: 2     hydrOXYzine (ATARAX) 25 MG tablet, , Disp: , Rfl: 2     levonorgestrel (MIRENA) 20 MCG/24HR IUD, 1 each (20 mcg) by Intrauterine route continuous, Disp: , Rfl:      melatonin 3 MG tablet, Take 1 tablet (3 mg) by mouth At Bedtime, Disp: 30 tablet, Rfl: 0     ORDER FOR ALLERGEN IMMUNOTHERAPY, Cat Hair, Standardized 10,000 BAU/mL, ALK  3.0 ml Dog Hair Dander, A. P.  1:100 w/v, HS  1.0 ml Dust Mites F 30,000AU/mL, HS  0.5 ml Dust Mites P. 30,000 AU/mL, HS  0.5 ml  Diluent: HSA qs to 5ml, Disp: 5 mL, Rfl: PRN     ORDER FOR ALLERGEN IMMUNOTHERAPY, Alternaria Tenuis 1:10 w/v, HS  0.5 ml Epicoccum Nigrum 1:10 w/v, HS 0.5 ml Hormodendrum Cladosporioides 1:10 w/v, HS 0.5 ml Diluent: HSA qs to 5ml, Disp: 5 mL, Rfl: PRN     ORDER FOR ALLERGEN IMMUNOTHERAPY, Estuardo, White  1:20 w/v, HS  0.5 ml Birch Mix PRW 1:20 w/v, HS  0.5 ml Elm, American 1:20 w/v, HS  0.5 ml Hackberry 1:20 w/v, HS 0.5 ml Hickory, Shagbark 1:20 w/v, HS  0.5 ml [place] Mix RW 1:20 w/v, HS 0.5 ml Oak Mix RVW 1:20 w/v, HS 0.5 ml [place] Tree, Black 1:20 w/v, HS 0.5 ml [place], Black 1:20 w/v, HS 0.5 ml Diluent: HSA qs to 5ml, Disp: 5 mL, Rfl: PRN     ORDER FOR ALLERGEN IMMUNOTHERAPY, Kochia 1:20 w/v, HS 1.0  ml Nettle 1:20 w/v, HS 1.0 ml Plantain, English 1:20 w/v, HS 1.0 ml Ragweed Mixed 1:20 w/v ALK  0.6 ml Russian Thistle 1:20 w/v, HS 1.0 ml Diluent: HSA qs to 5ml, Disp: 5 mL, Rfl: PRN     ranitidine (ZANTAC) 150 MG tablet, Take 1 tablet (150 mg) by mouth 2 times daily, Disp: 60 tablet, Rfl: 1     traZODone (DESYREL) 50 MG tablet, Take 1 tablet (50 mg) by mouth At Bedtime, Disp: 30 tablet, Rfl: 3     VENTOLIN  (90 Base) MCG/ACT Inhaler, , Disp: , Rfl: 2  Immunization History   Administered Date(s) Administered     Influenza (IIV3) PF 12/06/2012     Influenza Vaccine IM 3yrs+ 4 Valent IIV4 11/21/2017     TDAP Vaccine (Adacel) 12/06/2012     Allergies   Allergen Reactions     Animal Dander      Nkda [No Known Drug Allergies]      Seasonal Allergies      Weeds and mold         EXAM:   Constitutional:  Appears well-developed and well-nourished. No distress.   HEENT:   Head: Normocephalic.   Mouth/Throat:  No cobblestoning of posterior oropharynx.   Nasal tissue pink and normal appearing.  Clear rhinorrhea noted.    Eyes: Conjunctivae are non-erythematous   No maxillary or frontal sinus tenderness to palpation.   Cardiovascular: Normal rate, regular rhythm and normal heart sounds. Exam reveals no gallop and no friction rub.   No murmur heard.  Respiratory: Effort normal and breath sounds normal. No respiratory distress. No wheezes. No rales.   Musculoskeletal: Normal range of motion.   Neuro: Oriented to person, place, and time.  Skin: Skin is warm and dry. No rash noted.   Psychiatric: Normal mood and affect.     Nursing note and vitals reviewed.      WORKUP: Cluster immunotherapy   The patient received red 0.05, red 0.10. Each dose was  by 30 minutes. Full report will be scanned into electronic medical record. The patient was in office for over 1.0 hours.      ASSESSMENT/PLAN:  Problem List Items Addressed This Visit        Respiratory    Seasonal allergic rhinitis due to pollen - Primary     History of  nasal and ocular symptoms for multiple years. Perennial with spring and fall worsening. Tried cetirizine and loratadine which were helpful. Currently used Allegra as needed. Allergy testing done in 2013 positive for dust mite, cat, dog, molds, trees, grasses and weeds. Tolerated cluster immunotherapy.       Skin testing  Positive for cat, dog, dust mites, weeds, trees and molds.       - Flonase 2 spray/nostril daily. Use spring and fall at the very least.   - Allegra or Zyrtec as needed and on allergy shot days.   - Continue allergen immunotherapy.          Relevant Orders    RAPID DESENSITIZATION (Completed)    Allergic rhinitis due to mold    Relevant Orders    RAPID DESENSITIZATION (Completed)    Allergic rhinitis due to animal dander    Relevant Orders    RAPID DESENSITIZATION (Completed)    Allergic rhinitis due to dust mite    Relevant Orders    RAPID DESENSITIZATION (Completed)          Chart documentation with Dragon Voice recognition Software. Although reviewed after completion, some words and grammatical errors may remain.    Magdy Blevins,    Allergy/Immunology  [facility] [place] and [place], [place]

## 2018-06-21 ENCOUNTER — HOSPITAL ENCOUNTER (OUTPATIENT)
Dept: BEHAVIORAL HEALTH | Facility: CLINIC | Age: 33
End: 2018-06-21
Attending: PSYCHIATRY & NEUROLOGY
Payer: COMMERCIAL

## 2018-06-21 PROCEDURE — H2012 BEHAV HLTH DAY TREAT, PER HR: HCPCS

## 2018-06-21 NOTE — PROGRESS NOTES
"Adult Mental Health Outpatient Group Therapy Progress Note     Client Initial Individualized Goals for Treatment: \"to prepare for a healthy lifestyle\"      See Initial Treatment suggestions for the client during the time between Diagnostic Assessment and completion of the Master Individualized Treatment Plan.    Treatment Goals:  1.Client will notify staff when needing assistance to develop or implement a coping plan to manage suicidal or self injurious urges.  2. When in self support skills group client will learn and practice 1-2 coping skills to help mange stress,decrease procrastination and make better personal decisions providing an update of progress weekly.  3.  Group Therapy learn and practice 2 skills to manage the anxiety, depression and reduce negative thoughts.   4.  Community Resources: Find a support group near home, such as [facility]    Area of Treatment Focus:  Symptom Management    Therapeutic Interventions/Treatment Strategies:  Support, Feedback, Safety Assessments, Structured Activity and Education    Response to Treatment Strategies:  Accepted Feedback, Listened, Attentive, Accepted Support and Alert    Name of Group: Group Psychotherapy 2-2:50pm    Description and Outcome:  Maddy reported a goal of doing couples therapy with her boyfriend, stated her parents feel he is manipulating her. States it's scheduled for the 25th. States she saw her therapist today, and psychiatry is on 7/6. Also states she is working on finding a new job. Plans to clean the house this weekend and spend time with her daughter. Denied any SI.       Is this a Weekly Review of the Progress on the Treatment Plan?  Yes.      Are Treatment Plan Goals being addressed?  Yes, continue treatment goals      Are Treatment Plan Strategies to Address Goals Effective?  Yes, continue treatment strategies      Are there any current contracts in place?  No      "

## 2018-06-22 NOTE — PROGRESS NOTES
"Adult Mental Health Outpatient Group Therapy Progress Note     Client Initial Individualized Goals for Treatment: \"to prepare for a healthy lifestyle\"      See Initial Treatment suggestions for the client during the time between Diagnostic Assessment and completion of the Master Individualized Treatment Plan.    Treatment Goals:  1.Client will notify staff when needing assistance to develop or implement a coping plan to manage suicidal or self injurious urges.  2. When in self support skills group client will learn and practice 1-2 coping skills to help mange stress,decrease procrastination and make better personal decisions providing an update of progress weekly.  3.  Group Therapy learn and practice 2 skills to manage the anxiety, depression and reduce negative thoughts.   4.  Community Resources: Find a support group near home, such as [facility]    Area of Treatment Focus:  Symptom Management    Therapeutic Interventions/Treatment Strategies:  Support, Feedback, Safety Assessments, Structured Activity and Education    Response to Treatment Strategies:  Accepted Feedback, Listened, Attentive, Accepted Support and Alert    Name of Group: Life Skills (3:00-3:50)    Description and Outcome:  Client presented with calm,even mood with fair concentration and focus.Psychoeducation information presented included a discussion related to time management and weekly schedules.Thought process clear,goal directed and reality based.Goal #1 (no personal safety concerns reported or observed). Goal #2 Not addressed.  Client would benefit from additional opportunities to practice and implement content from this session in her every day life and mental health recovery.    Is this a Weekly Review of the Progress on the Treatment Plan?  Yes.      Are Treatment Plan Goals being addressed?  Yes, continue treatment goals      Are Treatment Plan Strategies to Address Goals Effective?  Yes, continue treatment strategies      Are there any " current contracts in place?  No

## 2018-06-25 ENCOUNTER — HOSPITAL ENCOUNTER (OUTPATIENT)
Dept: BEHAVIORAL HEALTH | Facility: CLINIC | Age: 33
End: 2018-06-25
Attending: PSYCHIATRY & NEUROLOGY
Payer: COMMERCIAL

## 2018-06-25 PROCEDURE — H2012 BEHAV HLTH DAY TREAT, PER HR: HCPCS

## 2018-06-25 NOTE — PROGRESS NOTES
"Adult Mental Health Outpatient Group Therapy Progress Note       Name of Group:  4C Group Therapy   Time:            2:00-2:50 pm  Group Therapy      Therapist:      Dariusz Spaulding, D,  L.P.          Client Initial Individualized Goals for Treatment:   \"to prepare for a healthy lifestyle\".        Diagnosis  295.70  (F25) Schizoaffective Disorder Bipolar Type      Treatment Goals:   1.  Personal Safety  Report any urges or thoughts to harm yourself to the Tx team.      2.  Self-Support Skills:  practice coping skills/strategies to help   3.  Group Therapy learn and practice 2 skills to manage the anxiety, depression and reduce negative thoughts.   4.  Community Resources: Find a support group near home, such as [facility]      Area of Treatment Focus:  Symptom Management  Personal Safety      Therapeutic Interventions/Treatment Strategies:  Support, Feedback, Safety Assessments, Structured Activity and Education      Response to Treatment Strategies:  Accepted Feedback, Listened, Attentive, Accepted Support and Alert      Name of Group:  4C Wellness and Group Therapy      Description and Therapeutic Outcome:          Mental Health Management                         The client reported being safe today. The group participated in a structured activity that involved reading about grounding skills to manage de-personalization and dissociation.   The group practiced an example of a mental grounding skill and named all things in the room that were green.   The group discussed the use of the skills in different situations with other members. The group discussed anxiety symptoms and their feelings of doom that may accompany it, and how to lower the distress level with the grounding skills. The group discussed self-soothing skills and physical focused skills.  The group formed a list of skills to use. The group made a small book to put their list of skills in for future reference.  The group shared their responses to each " questions.          The group participated in 10 minutes of mindfulness breathing.       Time:     2:00-2:50 pm  Group Therapy                  Maddy reported that she has been safe. She stated that her mom and her daughter went to a relative's cabin for the week, so she was at home by herself. She stated that her boyfriend came over and they cooked a meal, and talked to the group about what she cooked.  She said that she mainly heats up frozen dinners and entrees.   She stated that they watched a movie.  She reported feeling more anxiety and restlessness, lately.  She stated that she tries to do distracting activities, but she did take PRN's this weekend, and slept.    She practiced 10 minutes of mindfulness stretches with the group.      Client demonstrated understanding of session content by participating in the group therapy discussion, and sharing ideas with others in the group for ways to cope.   She stated that he has a Fertility appointment on 6/26, and another doctor appointment on 7/2 for her weight.  She offered suggestions to other group members.      Client will benefit from additional opportunities to practice and implement content from this session and receive feedback from the group.        Is this a Weekly Review of the Progress on the Treatment Plan?  Yes.        Are Treatment Plan Goals being addressed?  Yes, continue treatment goals          Are Treatment Plan Strategies to Address Goals Effective?  Yes, continue treatment strategies          Are there any current contracts in place?  No

## 2018-06-26 ENCOUNTER — TELEPHONE (OUTPATIENT)
Dept: BEHAVIORAL HEALTH | Facility: CLINIC | Age: 33
End: 2018-06-26

## 2018-06-26 NOTE — PROGRESS NOTES
"Adult Mental Health Outpatient Group Therapy Progress Note     Client Initial Individualized Goals for Treatment: \"to prepare for a healthy lifestyle\"      See Initial Treatment suggestions for the client during the time between Diagnostic Assessment and completion of the Master Individualized Treatment Plan.    Treatment Goals:  1.Client will notify staff when needing assistance to develop or implement a coping plan to manage suicidal or self injurious urges.  2. When in self support skills group client will learn and practice 1-2 coping skills to help mange stress,decrease procrastination and make better personal decisions providing an update of progress weekly.  3.  Group Therapy learn and practice 2 skills to manage the anxiety, depression and reduce negative thoughts.   4.  Community Resources: Find a support group near home, such as [facility]    Area of Treatment Focus:  Symptom Management    Therapeutic Interventions/Treatment Strategies:  Support, Feedback, Safety Assessments, Structured Activity and Education    Response to Treatment Strategies:  Accepted Feedback, Listened, Attentive, Accepted Support and Alert    Name of Group: Life Skills (3:00-3:50)    Description and Outcome:  Client presented with calm,even mood with fair concentration and focus.Psychoeducation information presented included a discussion related to stress management and a review of coping skills.Thought process clear,goal directed and reality based.Goal #1 (no personal safety concerns reported or observed). Goal #2 Addressed per discussion.  Client would benefit from additional opportunities to practice and implement content from this session in her every day life and mental health recovery.    Is this a Weekly Review of the Progress on the Treatment Plan?  Yes.      Are Treatment Plan Goals being addressed?  Yes, continue treatment goals      Are Treatment Plan Strategies to Address Goals Effective?  Yes, continue treatment " strategies      Are there any current contracts in place?  No

## 2018-06-26 NOTE — TELEPHONE ENCOUNTER
Phone call from Maddy, who said that she had another  Appointment and would be here on Thursday.    Nely Arhtur, Jacek., D,  L.P.

## 2018-06-27 ENCOUNTER — OFFICE VISIT (OUTPATIENT)
Dept: ALLERGY | Facility: OTHER | Age: 33
End: 2018-06-27
Payer: COMMERCIAL

## 2018-06-27 VITALS
WEIGHT: 196.5 LBS | OXYGEN SATURATION: 100 % | TEMPERATURE: 97.9 F | DIASTOLIC BLOOD PRESSURE: 70 MMHG | BODY MASS INDEX: 32.7 KG/M2 | SYSTOLIC BLOOD PRESSURE: 104 MMHG | HEART RATE: 92 BPM

## 2018-06-27 DIAGNOSIS — J30.89 ALLERGIC RHINITIS DUE TO DUST MITE: ICD-10-CM

## 2018-06-27 DIAGNOSIS — J30.1 CHRONIC SEASONAL ALLERGIC RHINITIS DUE TO POLLEN: Primary | ICD-10-CM

## 2018-06-27 DIAGNOSIS — J30.81 ALLERGIC RHINITIS DUE TO ANIMAL DANDER: ICD-10-CM

## 2018-06-27 DIAGNOSIS — J30.89 ALLERGIC RHINITIS DUE TO MOLD: ICD-10-CM

## 2018-06-27 PROCEDURE — 99207 ZZC DROP WITH A PROCEDURE: CPT | Mod: 25 | Performed by: ALLERGY & IMMUNOLOGY

## 2018-06-27 PROCEDURE — 95180 RAPID DESENSITIZATION: CPT | Performed by: ALLERGY & IMMUNOLOGY

## 2018-06-27 NOTE — PATIENT INSTRUCTIONS
Allergy Staff Appt Hours Shot Hours Locations    Physician     Magdy Blevins DO       Support Staff     Nessa SIERRA, RN      Neeru SIERRA, [facility]  Monday:                      [place] 8-7     Tuesday:         [place] 8-5     Wednesday:        [place]: 7-5     Friday:        [place] 7-5   [place]        Monday: 9-5:50        Wednesday: 2-5:50        Friday: 7-12:50     [place]        Tuesday: 7-10:50        Thursday: 1:30-6:30        Friday: 8:00-3:50     [place] Monday: 7:10-4:50        Tuesday: 12:30-6:30        Thursday: 7-11:50 [facility]  [address] [place] [address]  Appt Line: [phone]  Allergy RN (Monday):  [phone]    [facility]  [address] [place] [address]  Appt Line: [phone]  Allergy RN (Tues & Wed):  [phone]    [facility]  [address] [place] [address]  Appt Line: [phone]  Allergy RN (Friday):  [phone]       Important Scheduling Information  Aspirin Desensitization: Appt will last 2 clinic days. Please call the Allergy RN line for your clinic to schedule. Discontinue antihistamines 7 days prior to the appointment.     Food Challenges: Appt will last 3-4 hours. Please call the Allergy RN line for your clinic to schedule. Discontinue antihistamines 7 days prior to the appointment.     Penicillin Testing: Appt will last 2-3 hours. Please call the Allergy RN line for your clinic to schedule. Discontinue antihistamines 7 days prior to the appointment.     Skin Testing: Appt will about 40 minutes. Call the appointment line for your clinic to schedule. Discontinue antihistamines 7 days prior to the appointment.     Venom Testing: Appt will last 2-3 hours. Please call the Allergy RN line for your clinic to schedule. Discontinue antihistamines 7 days prior to the appointment.     Thank you for trusting us with your Allergy, Asthma, and Immunology care. Please feel free to contact us with any questions or concerns you may  have.

## 2018-06-27 NOTE — PROGRESS NOTES
Maddy Ortiz is a 32 year old  female with previous medical history significant for allergic rhinitis who returns for a follow up visit. Maddy Ortiz is being seen today for asthma and seasonal allergies.       Patient presents today for cluster immunotherapy. The patient is currently in a good state of health. No recent fevers, chills, cough, wheezing, shortness of breath, skin rash, angioedema, nausea, vomiting or diarrhea.The risks and benefits were discussed and the patient/patient's family wishes to proceed. The consent was signed.    Past Medical History:   Diagnosis Date     Bipolar 1 disorder (H) 12/6/2012     Depressive disorder      Paranoid type delusional disorder (H) 11/14/2012     Family History   Problem Relation Age of Onset     Adopted: Yes     Unknown/Adopted Mother      Unknown/Adopted Father      Unknown/Adopted Other      Past Surgical History:   Procedure Laterality Date     TONSILLECTOMY & ADENOIDECTOMY      as a child       REVIEW OF SYSTEMS:  General: negative for weight gain. negative for weight loss. negative for changes in sleep.   Ears: negative for fullness. negative for hearing loss. negative for dizziness.   Nose: negative for snoring.negative for changes in smell. negative for drainage.   Throat: negative for hoarseness. negative for sore throat. negative for trouble swallowing.   Lungs: negative for shortness of breath.negative for wheezing. negative for sputum production.   Cardiovascular: negative for chest pain. negative for swelling of ankles. negative for fast or irregular heartbeat.   Gastrointestinal: negative for nausea. negative for heartburn. negative for acid reflux.   Musculoskeletal: negative for joint pain. negative for joint stiffness. negative for joint swelling.   Neurologic: negative for seizures. negative for fainting. negative for weakness.   Psychiatric: negative for changes in mood. negative for anxiety.   Endocrine: negative for cold intolerance.  negative for heat intolerance. negative for tremors.   Hematologic: negative for easy bruising. negative for easy bleeding.  Integumentary: negative for rash. negative for scaling. negative for nail changes.       Current Outpatient Prescriptions:      cetirizine (ZYRTEC) 10 MG tablet, Take 1 tablet (10 mg) by mouth 2 times daily, Disp: 60 tablet, Rfl: 11     EPINEPHrine (EPIPEN 2-ODETTE) 0.3 MG/0.3ML injection 2-pack, Inject 0.3 mLs (0.3 mg) into the muscle once as needed for anaphylaxis, Disp: 0.6 mL, Rfl: 3     haloperidol (HALDOL) 0.5 MG tablet, Take 1 mg by mouth 2 times daily 3mg in the morning;  4 mg at night, Disp: , Rfl:      haloperidol (HALDOL) 1 MG tablet, Take 7 mg by mouth daily , Disp: , Rfl: 2     hydrOXYzine (ATARAX) 25 MG tablet, , Disp: , Rfl: 2     levonorgestrel (MIRENA) 20 MCG/24HR IUD, 1 each (20 mcg) by Intrauterine route continuous, Disp: , Rfl:      ORDER FOR ALLERGEN IMMUNOTHERAPY, Cat Hair, Standardized 10,000 BAU/mL, ALK  3.0 ml Dog Hair Dander, A. P.  1:100 w/v, HS  1.0 ml Dust Mites F 30,000AU/mL, HS  0.5 ml Dust Mites P. 30,000 AU/mL, HS  0.5 ml  Diluent: HSA qs to 5ml, Disp: 5 mL, Rfl: PRN     ORDER FOR ALLERGEN IMMUNOTHERAPY, Alternaria Tenuis 1:10 w/v, HS  0.5 ml Epicoccum Nigrum 1:10 w/v, HS 0.5 ml Hormodendrum Cladosporioides 1:10 w/v, HS 0.5 ml Diluent: HSA qs to 5ml, Disp: 5 mL, Rfl: PRN     ORDER FOR ALLERGEN IMMUNOTHERAPY, Estuardo, White  1:20 w/v, HS  0.5 ml Birch Mix PRW 1:20 w/v, HS  0.5 ml Elm, American 1:20 w/v, HS  0.5 ml Hackberry 1:20 w/v, HS 0.5 ml Hickory, Shagbark 1:20 w/v, HS  0.5 ml Ajo Mix RW 1:20 w/v, HS 0.5 ml Oak Mix RVW 1:20 w/v, HS 0.5 ml [place] Tree, Black 1:20 w/v, HS 0.5 ml Howard, Black 1:20 w/v, HS 0.5 ml Diluent: HSA qs to 5ml, Disp: 5 mL, Rfl: PRN     ORDER FOR ALLERGEN IMMUNOTHERAPY, Kochia 1:20 w/v, HS 1.0 ml Nettle 1:20 w/v, HS 1.0 ml Plantain, English 1:20 w/v, HS 1.0 ml Ragweed Mixed 1:20 w/v ALK  0.6 ml Russian Thistle 1:20 w/v, HS 1.0 ml  Diluent: HSA qs to 5ml, Disp: 5 mL, Rfl: PRN     ranitidine (ZANTAC) 150 MG tablet, Take 1 tablet (150 mg) by mouth 2 times daily, Disp: 60 tablet, Rfl: 1     traZODone (DESYREL) 50 MG tablet, Take 1 tablet (50 mg) by mouth At Bedtime, Disp: 30 tablet, Rfl: 3     VENTOLIN  (90 Base) MCG/ACT Inhaler, , Disp: , Rfl: 2     melatonin 3 MG tablet, Take 1 tablet (3 mg) by mouth At Bedtime, Disp: 30 tablet, Rfl: 0  Immunization History   Administered Date(s) Administered     Influenza (IIV3) PF 12/06/2012     Influenza Vaccine IM 3yrs+ 4 Valent IIV4 11/21/2017     TDAP Vaccine (Adacel) 12/06/2012     Allergies   Allergen Reactions     Animal Dander      Nkda [No Known Drug Allergies]      Seasonal Allergies      Weeds and mold         EXAM:   Constitutional:  Appears well-developed and well-nourished. No distress.   HEENT:   Head: Normocephalic.   Mouth/Throat: No oropharyngeal exudate present.   No cobblestoning of posterior oropharynx.   Nasal tissue pink and normal appearing.  No rhinorrhea noted.    Eyes: Conjunctivae are non-erythematous   Cardiovascular: Normal rate, regular rhythm and normal heart sounds. Exam reveals no gallop and no friction rub.   No murmur heard.  Respiratory: Effort normal and breath sounds normal. No respiratory distress. No wheezes. No rales.   Musculoskeletal: Normal range of motion.   Neuro: Oriented to person, place, and time.  Skin: Skin is warm and dry. No rash noted.   Psychiatric: Normal mood and affect.     Nursing note and vitals reviewed.      WORKUP:  Cluster immunotherapy   The patient received red 0.15, red 0.20. Each dose was  by 30 minutes. Full report will be scanned into electronic medical record. The patient was in office for over 1.0 hours.    ASSESSMENT/PLAN:  Problem List Items Addressed This Visit        Respiratory    Seasonal allergic rhinitis due to pollen - Primary     History of nasal and ocular symptoms for multiple years. Perennial with spring and  fall worsening. Tried cetirizine and loratadine which were helpful. Currently used Allegra as needed. Allergy testing done in 2013 positive for dust mite, cat, dog, molds, trees, grasses and weeds. Tolerated cluster immunotherapy.       Skin testing  Positive for cat, dog, dust mites, weeds, trees and molds.       - Flonase 2 spray/nostril daily. Use spring and fall at the very least.   - Allegra or Zyrtec as needed and on allergy shot days.   - Continue allergen immunotherapy.          Relevant Orders    RAPID DESENSITIZATION    Allergic rhinitis due to mold    Relevant Orders    RAPID DESENSITIZATION    Allergic rhinitis due to animal dander    Relevant Orders    RAPID DESENSITIZATION    Allergic rhinitis due to dust mite    Relevant Orders    RAPID DESENSITIZATION          Chart documentation with Dragon Voice recognition Software. Although reviewed after completion, some words and grammatical errors may remain.    Magdy Blevins,    Allergy/Immunology  [facility] [place] and [place], [place]

## 2018-06-27 NOTE — LETTER
6/27/2018         RE: Maddy Ortiz  [address]  [facility] [id number]        Dear Colleague,    Thank you for referring your patient, Maddy Ortiz, to the [facility]. Please see a copy of my visit note below.      Maddy Ortiz is a 32 year old  female with previous medical history significant for allergic rhinitis who returns for a follow up visit. Maddy Ortiz is being seen today for asthma and seasonal allergies.       Patient presents today for cluster immunotherapy. The patient is currently in a good state of health. No recent fevers, chills, cough, wheezing, shortness of breath, skin rash, angioedema, nausea, vomiting or diarrhea. She has had some recent nasal congestion. Mild URI.  The risks and benefits were discussed and the patient/patient's family wishes to proceed. The consent was signed.    Past Medical History:   Diagnosis Date     Bipolar 1 disorder (H) 12/6/2012     Depressive disorder      Paranoid type delusional disorder (H) 11/14/2012     Family History   Problem Relation Age of Onset     Adopted: Yes     Unknown/Adopted Mother      Unknown/Adopted Father      Unknown/Adopted Other      Past Surgical History:   Procedure Laterality Date     TONSILLECTOMY & ADENOIDECTOMY      as a child       REVIEW OF SYSTEMS:  General: negative for weight gain. negative for weight loss. negative for changes in sleep.   Ears: negative for fullness. negative for hearing loss. negative for dizziness.   Nose: negative for snoring.negative for changes in smell. negative for drainage.   Throat: negative for hoarseness. negative for sore throat. negative for trouble swallowing.   Lungs: negative for shortness of breath.negative for wheezing. negative for sputum production.   Cardiovascular: negative for chest pain. negative for swelling of ankles. negative for fast or irregular heartbeat.   Gastrointestinal: negative for nausea. negative for heartburn. negative for acid  reflux.   Musculoskeletal: negative for joint pain. negative for joint stiffness. negative for joint swelling.   Neurologic: negative for seizures. negative for fainting. negative for weakness.   Psychiatric: negative for changes in mood. negative for anxiety.   Endocrine: negative for cold intolerance. negative for heat intolerance. negative for tremors.   Hematologic: negative for easy bruising. negative for easy bleeding.  Integumentary: negative for rash. negative for scaling. negative for nail changes.       Current Outpatient Prescriptions:      cetirizine (ZYRTEC) 10 MG tablet, Take 1 tablet (10 mg) by mouth 2 times daily, Disp: 60 tablet, Rfl: 11     EPINEPHrine (EPIPEN 2-ODETTE) 0.3 MG/0.3ML injection 2-pack, Inject 0.3 mLs (0.3 mg) into the muscle once as needed for anaphylaxis, Disp: 0.6 mL, Rfl: 3     haloperidol (HALDOL) 0.5 MG tablet, Take 1 mg by mouth 2 times daily 3mg in the morning;  4 mg at night, Disp: , Rfl:      haloperidol (HALDOL) 1 MG tablet, Take 7 mg by mouth daily , Disp: , Rfl: 2     hydrOXYzine (ATARAX) 25 MG tablet, , Disp: , Rfl: 2     levonorgestrel (MIRENA) 20 MCG/24HR IUD, 1 each (20 mcg) by Intrauterine route continuous, Disp: , Rfl:      ORDER FOR ALLERGEN IMMUNOTHERAPY, Cat Hair, Standardized 10,000 BAU/mL, ALK  3.0 ml Dog Hair Dander, A. P.  1:100 w/v, HS  1.0 ml Dust Mites F 30,000AU/mL, HS  0.5 ml Dust Mites P. 30,000 AU/mL, HS  0.5 ml  Diluent: HSA qs to 5ml, Disp: 5 mL, Rfl: PRN     ORDER FOR ALLERGEN IMMUNOTHERAPY, Alternaria Tenuis 1:10 w/v, HS  0.5 ml Epicoccum Nigrum 1:10 w/v, HS 0.5 ml Hormodendrum Cladosporioides 1:10 w/v, HS 0.5 ml Diluent: HSA qs to 5ml, Disp: 5 mL, Rfl: PRN     ORDER FOR ALLERGEN IMMUNOTHERAPY, Estuardo, White  1:20 w/v, HS  0.5 ml Birch Mix PRW 1:20 w/v, HS  0.5 ml Elm, American 1:20 w/v, HS  0.5 ml Hackberry 1:20 w/v, HS 0.5 ml Hickory, Shagbark 1:20 w/v, HS  0.5 ml Wilder Mix RW 1:20 w/v, HS 0.5 ml Oak Mix RVW 1:20 w/v, HS 0.5 ml [place] Tree, Black  1:20 w/v, HS 0.5 ml Orgas, Black 1:20 w/v, HS 0.5 ml Diluent: HSA qs to 5ml, Disp: 5 mL, Rfl: PRN     ORDER FOR ALLERGEN IMMUNOTHERAPY, Kochia 1:20 w/v, HS 1.0 ml Nettle 1:20 w/v, HS 1.0 ml Plantain, English 1:20 w/v, HS 1.0 ml Ragweed Mixed 1:20 w/v ALK  0.6 ml Russian Thistle 1:20 w/v, HS 1.0 ml Diluent: HSA qs to 5ml, Disp: 5 mL, Rfl: PRN     ranitidine (ZANTAC) 150 MG tablet, Take 1 tablet (150 mg) by mouth 2 times daily, Disp: 60 tablet, Rfl: 1     traZODone (DESYREL) 50 MG tablet, Take 1 tablet (50 mg) by mouth At Bedtime, Disp: 30 tablet, Rfl: 3     VENTOLIN  (90 Base) MCG/ACT Inhaler, , Disp: , Rfl: 2     melatonin 3 MG tablet, Take 1 tablet (3 mg) by mouth At Bedtime, Disp: 30 tablet, Rfl: 0  Immunization History   Administered Date(s) Administered     Influenza (IIV3) PF 12/06/2012     Influenza Vaccine IM 3yrs+ 4 Valent IIV4 11/21/2017     TDAP Vaccine (Adacel) 12/06/2012     Allergies   Allergen Reactions     Animal Dander      Nkda [No Known Drug Allergies]      Seasonal Allergies      Weeds and mold         EXAM:   Constitutional:  Appears well-developed and well-nourished. No distress.   HEENT:   Head: Normocephalic.   Mouth/Throat: No oropharyngeal exudate present.   No cobblestoning of posterior oropharynx.   Nasal tissue pink and normal appearing.  No rhinorrhea noted.    Eyes: Conjunctivae are non-erythematous   Cardiovascular: Normal rate, regular rhythm and normal heart sounds. Exam reveals no gallop and no friction rub.   No murmur heard.  Respiratory: Effort normal and breath sounds normal. No respiratory distress. No wheezes. No rales.   Musculoskeletal: Normal range of motion.   Neuro: Oriented to person, place, and time.  Skin: Skin is warm and dry. No rash noted.   Psychiatric: Normal mood and affect.     Nursing note and vitals reviewed.      WORKUP:  Cluster immunotherapy   The patient received red 0.15, red 0.20. Each dose was  by 30 minutes. Full report will be scanned  into electronic medical record. The patient was in office for over 1.0 hours.    ASSESSMENT/PLAN:  Problem List Items Addressed This Visit        Respiratory    Seasonal allergic rhinitis due to pollen - Primary     History of nasal and ocular symptoms for multiple years. Perennial with spring and fall worsening. Tried cetirizine and loratadine which were helpful. Currently used Allegra as needed. Allergy testing done in 2013 positive for dust mite, cat, dog, molds, trees, grasses and weeds. Tolerated cluster immunotherapy.       Skin testing  Positive for cat, dog, dust mites, weeds, trees and molds.       - Flonase 2 spray/nostril daily. Use spring and fall at the very least.   - Allegra or Zyrtec as needed and on allergy shot days.   - Continue allergen immunotherapy.          Relevant Orders    RAPID DESENSITIZATION    Allergic rhinitis due to mold    Relevant Orders    RAPID DESENSITIZATION    Allergic rhinitis due to animal dander    Relevant Orders    RAPID DESENSITIZATION    Allergic rhinitis due to dust mite    Relevant Orders    RAPID DESENSITIZATION          Chart documentation with Dragon Voice recognition Software. Although reviewed after completion, some words and grammatical errors may remain.    Magdy Blevins,    Allergy/Immunology  [facility], [place] and [place]      Again, thank you for allowing me to participate in the care of your patient.        Sincerely,        Magdy Blevins, DO

## 2018-06-27 NOTE — MR AVS SNAPSHOT
After Visit Summary   6/27/2018    Madyd Ortiz    MRN: [id number]           Patient Information     Date Of Birth          1985        Visit Information        Provider Department      6/27/2018 1:00 PM Magdy Blevins DO [facility]        Today's Diagnoses     Chronic seasonal allergic rhinitis due to pollen    -  1    Allergic rhinitis due to mold        Allergic rhinitis due to dust mite        Allergic rhinitis due to animal dander          Care Instructions    Allergy Staff Appt Hours Shot Hours Locations    Physician     Magdy Blevins DO       Support Staff     Nessa SIERRA RN      Neeru SIERRA, [facility]  Monday:                      [place] 8-7     Tuesday:         [place] 8-5     Wednesday:        [place]: 7-5     Friday:        [place] 7-5   [place]        Monday: 9-5:50        Wednesday: 2-5:50        Friday: 7-12:50     [place]        Tuesday: 7-10:50        Thursday: 1:30-6:30        Friday: 8:00-3:50     [place]        Monday: 7:10-4:50        Tuesday: 12:30-6:30        Thursday: 7-11:50 [facility]  [address] [place] [address]  Appt Line: [phone]  Allergy RN (Monday):  [phone]    [facility]  [address] [place] [address]  Appt Line: [phone]  Allergy RN (Tues & Wed):  [phone]    [facility]  [address] [place] [address]  Appt Line: [phone]  Allergy RN (Friday):  [phone]       Important Scheduling Information  Aspirin Desensitization: Appt will last 2 clinic days. Please call the Allergy RN line for your clinic to schedule. Discontinue antihistamines 7 days prior to the appointment.     Food Challenges: Appt will last 3-4 hours. Please call the Allergy RN line for your clinic to schedule. Discontinue antihistamines 7 days prior to the appointment.     Penicillin Testing: Appt will last 2-3 hours. Please call the Allergy RN line for your clinic to schedule. Discontinue antihistamines 7  days prior to the appointment.     Skin Testing: Appt will about 40 minutes. Call the appointment line for your clinic to schedule. Discontinue antihistamines 7 days prior to the appointment.     Venom Testing: Appt will last 2-3 hours. Please call the Allergy RN line for your clinic to schedule. Discontinue antihistamines 7 days prior to the appointment.     Thank you for trusting us with your Allergy, Asthma, and Immunology care. Please feel free to contact us with any questions or concerns you may have.                Follow-ups after your visit        Your next 10 appointments already scheduled     Jun 28, 2018  1:00 PM CDT   Return Visit with ADULT  DAY 4C [facility] ([facility])    [address] [place] [address]   [phone]            Jul 02, 2018  9:00 AM CDT   (Arrive by 8:45 AM)   New Patient Visit with Maldonado Michele MD   [facility] ([facility])    [address] [place] [address]  [facility] [address]   [phone]            Jul 02, 2018  1:00 PM CDT   Return Visit with ADULT MH DAY 4C [facility] ([facility])    [address] [place] [address]   [phone]            Jul 03, 2018  1:00 PM CDT   Return Visit with ADULT  DAY 4C [facility] ([facility])    [address] [place] [address]   [phone]            Jul 05, 2018  1:00 PM CDT   Return Visit with ADULT MH DAY 4C [facility] ([facility])    [address] [place] [address]   [phone]            Jul 09, 2018  1:00 PM CDT   Return Visit with ADULT MH DAY 4C [facility] ([facility]  [place])    [address] [address]   [phone]            Jul 10, 2018  1:00 PM CDT   Return Visit with ADULT MH DAY 4C   [facility] ([facility])    [address] [address]   [phone]            Jul 11, 2018  1:00 PM CDT   [facility]ins,    [facility]    [address]  [facility] [address]   [phone]            Jul 12, 2018  1:00 PM CDT   Return Visit with ADULT MH DAY 4C   [facility] ([facility])    [facility] [address]   [phone]            Jul 16, 2018  1:00 PM CDT   Return Visit with ADULT MH DAY 4C   [facility] ([facility])    [address] [place] [address]   [phone]              Who to contact     If you have questions or need follow up information about today's clinic visit or your schedule please contact [facility] directly at [phone].  Normal or non-critical lab and imaging results will be communicated to you by MyChart, letter or phone within 4 business days after the clinic has received the results. If you do not hear from us within 7 days, please contact the clinic through MyChart or phone. If you have a critical or abnormal lab result, we will notify you by phone as soon as possible.  Submit refill requests through [employer] or call your pharmacy and they will forward the refill request to us. Please allow 3 business days for your refill to be completed.          Additional Information About Your Visit        Care EveryWhere ID     This is your Care EveryWhere ID. This could be used by other organizations to access your [facility] medical records  [id number]        Your Vitals Were     Pulse Temperature  Pulse Oximetry BMI (Body Mass Index)          92 97.9  F (36.6  C) (Oral) 100% 32.7 kg/m2         Blood Pressure from Last 3 Encounters:   06/27/18 104/70   06/20/18 128/80   06/08/18 114/81    Weight from Last 3 Encounters:   06/27/18 89.1 kg (196 lb 8 oz)   06/20/18 89.4 kg (197 lb)   06/08/18 90.7 kg (200 lb)              We Performed the Following     RAPID DESENSITIZATION        Primary Care Provider Office Phone # Fax #    Shirley Andrade Elaina Freeman, APRN [phone] [address] [facility] [address]        Equal Access to Services     ARMOND MCKEON : Kamryn robbinso Tyron, waaxda luqadaha, qaybta kaalmada adeegyada, lincoln maciel . So [facility] [phone].    ATENCIÓN: Si habla español, tiene a ttius disposición servicios gratuitos de asistencia lingüística. [facility] [phone].    We comply with applicable federal civil rights laws and [place] laws. We do not discriminate on the basis of race, color, national origin, age, disability, sex, sexual orientation, or gender identity.            Thank you!     Thank you for choosing [facility]  for your care. Our goal is always to provide you with excellent care. Hearing back from our patients is one way we can continue to improve our services. Please take a few minutes to complete the written survey that you may receive in the mail after your visit with us. Thank you!             Your Updated Medication List - Protect others around you: Learn how to safely use, store and throw away your medicines at www.disposemymeds.org.          This list is accurate as of 6/27/18  2:33 PM.  Always use your most recent med list.                   Brand Name Dispense Instructions for use Diagnosis    * ALLERGEN IMMUNOTHERAPY PRESCRIPTION     5 mL    Cat Hair, Standardized 10,000 BAU/mL, ALK  3.0 ml Dog Hair Dander, A. P.  1:100 w/v, HS  1.0 ml Dust Mites F 30,000AU/mL, HS  0.5 ml Dust Mites P. 30,000  AU/mL, HS  0.5 ml  Diluent: HSA qs to 5ml    Allergic rhinitis due to animal dander, Allergic rhinitis due to dust mite       * ALLERGEN IMMUNOTHERAPY PRESCRIPTION     5 mL    Alternaria Tenuis 1:10 w/v, HS  0.5 ml Epicoccum Nigrum 1:10 w/v, HS 0.5 ml Hormodendrum Cladosporioides 1:10 w/v, HS 0.5 ml Diluent: HSA qs to 5ml    Allergic rhinitis due to mold       * ALLERGEN IMMUNOTHERAPY PRESCRIPTION     5 mL    Estuardo, White  1:20 w/v, HS  0.5 ml Birch Mix PRW 1:20 w/v, HS  0.5 ml Elm, American 1:20 w/v, HS  0.5 ml Hackberry 1:20 w/v, HS 0.5 ml Hickory, Shagbark 1:20 w/v, HS  0.5 ml Girard Mix RW 1:20 w/v, HS 0.5 ml Oak Mix RVW 1:20 w/v, HS 0.5 ml Spencer Tree, Black 1:20 w/v, HS 0.5 ml Boston, Black 1:20 w/v, HS 0.5 ml Diluent: HSA qs to 5ml    Chronic seasonal allergic rhinitis due to pollen       * ALLERGEN IMMUNOTHERAPY PRESCRIPTION     5 mL    Kochia 1:20 w/v, HS 1.0 ml Nettle 1:20 w/v, HS 1.0 ml Plantain, English 1:20 w/v, HS 1.0 ml Ragweed Mixed 1:20 w/v ALK  0.6 ml Russian Thistle 1:20 w/v, HS 1.0 ml Diluent: HSA qs to 5ml    Chronic seasonal allergic rhinitis due to pollen       cetirizine 10 MG tablet    zyrTEC    60 tablet    Take 1 tablet (10 mg) by mouth 2 times daily    Chronic seasonal allergic rhinitis due to pollen       EPINEPHrine 0.3 MG/0.3ML injection 2-pack    EPIPEN 2-ODETTE    0.6 mL    Inject 0.3 mLs (0.3 mg) into the muscle once as needed for anaphylaxis    Need for desensitization to allergens       * haloperidol 0.5 MG tablet    HALDOL     Take 1 mg by mouth 2 times daily 3mg in the morning;  4 mg at night        * haloperidol 1 MG tablet    HALDOL     Take 7 mg by mouth daily        hydrOXYzine 25 MG tablet    ATARAX          levonorgestrel 20 MCG/24HR IUD    MIRENA     1 each (20 mcg) by Intrauterine route continuous    Encounter for insertion of mirena IUD       melatonin 3 MG tablet     30 tablet    Take 1 tablet (3 mg) by mouth At Bedtime    Insomnia, unspecified type       ranitidine 150  MG tablet    ZANTAC    60 tablet    Take 1 tablet (150 mg) by mouth 2 times daily    Gastroesophageal reflux disease without esophagitis       traZODone 50 MG tablet    DESYREL    30 tablet    Take 1 tablet (50 mg) by mouth At Bedtime    Insomnia, unspecified type       VENTOLIN  (90 Base) MCG/ACT Inhaler   Generic drug:  albuterol           * Notice:  This list has 6 medication(s) that are the same as other medications prescribed for you. Read the directions carefully, and ask your doctor or other care provider to review them with you.

## 2018-06-28 ENCOUNTER — HOSPITAL ENCOUNTER (OUTPATIENT)
Dept: BEHAVIORAL HEALTH | Facility: CLINIC | Age: 33
End: 2018-06-28
Attending: PSYCHIATRY & NEUROLOGY
Payer: COMMERCIAL

## 2018-06-28 PROCEDURE — H2012 BEHAV HLTH DAY TREAT, PER HR: HCPCS

## 2018-06-28 NOTE — PROGRESS NOTES
"Adult Mental Health Outpatient Group Therapy Progress Note       Name of Group:  4C Group Therapy   Time:            2:00-2:50 pm  Group Therapy      Therapist:      Dariusz Spaulding, D,  L.P.          Client Initial Individualized Goals for Treatment:   \"to prepare for a healthy lifestyle\".        Diagnosis  295.70  (F25) Schizoaffective Disorder Bipolar Type      Treatment Goals:   1.  Personal Safety  Report any urges or thoughts to harm yourself to the Tx team.      2.  Self-Support Skills:  practice coping skills/strategies to help   3.  Group Therapy learn and practice 2 skills to manage the anxiety, depression and reduce negative thoughts.   4.  Community Resources: Find a support group near home, such as [facility]      Area of Treatment Focus:  Symptom Management  Personal Safety      Therapeutic Interventions/Treatment Strategies:  Support, Feedback, Safety Assessments, Structured Activity and Education      Response to Treatment Strategies:  Accepted Feedback, Listened, Attentive, Accepted Support and Alert      Name of Group:  4C Wellness and Group Therapy                  Maddy reported that she has been safe. She stated that her mom and her daughter went to a relative's cabin for the week, so she was at home by herself. She stated that she has had arguments with her boyfriend and that they broke-up, but then got back together.   She said that she mainly heats up frozen pizzas, and doesn't feel like baking anything.  She stated that she and her boyfriend will go to [facility].   She stated that she played a computer game called [employer], went to [employer] each morning with her boyfriend, and talked to her boyfriend. She stated that she has an appointment with the [facility] on Friday. She stated that she got information about housing for disabled people in [place] from [employer] and plans to look for housing in [place], to be closer to her boyfriend. She reported that she will " visit with her mom and her daughter, who return on Saturday from their week at the cabin.      She practiced 10 minutes of mindfulness stretches with the group.  The group discussed deep breathing techniques.      Client demonstrated understanding of session content by participating in the group therapy discussion, and sharing ideas with others in the group for ways to cope.   She stated that he has a doctor appointment on 7/2 for her weight.  She offered suggestions to other group members.      Client will benefit from additional opportunities to practice and implement content from this session and receive feedback from the group.        Is this a Weekly Review of the Progress on the Treatment Plan?  Yes.        Are Treatment Plan Goals being addressed?  Yes, continue treatment goals          Are Treatment Plan Strategies to Address Goals Effective?  Yes, continue treatment strategies          Are there any current contracts in place?  No

## 2018-06-29 NOTE — PROGRESS NOTES
"Adult Mental Health Outpatient Group Therapy Progress Note     Client Initial Individualized Goals for Treatment: \"to prepare for a healthy lifestyle\"      See Initial Treatment suggestions for the client during the time between Diagnostic Assessment and completion of the Master Individualized Treatment Plan.    Treatment Goals:  1.Client will notify staff when needing assistance to develop or implement a coping plan to manage suicidal or self injurious urges.  2. When in self support skills group client will learn and practice 1-2 coping skills to help mange stress,decrease procrastination and make better personal decisions providing an update of progress weekly.  3.  Group Therapy learn and practice 2 skills to manage the anxiety, depression and reduce negative thoughts.   4.  Community Resources: Find a support group near home, such as [facility]    Area of Treatment Focus:  Symptom Management    Therapeutic Interventions/Treatment Strategies:  Support, Feedback, Safety Assessments, Structured Activity and Education    Response to Treatment Strategies:  Accepted Feedback, Listened, Attentive, Accepted Support and Alert    Name of Group: Life Skills (3:00-3:50)    Group Attendance: 4/5 clients    Description and Outcome:  Client presented with calm,even mood with fair concentration and focus.Psychoeducation information presented included a discussion related to self esteem with focus on strategies to decrease procrastination. Thought process clear,goal directed and reality based.Goal #1 (no personal safety concerns reported or observed). Goal #2 Addressed per discussion.  Client would benefit from additional opportunities to practice and implement content from this session in her every day life and mental health recovery.    Is this a Weekly Review of the Progress on the Treatment Plan?  Yes.      Are Treatment Plan Goals being addressed?  Yes, continue treatment goals      Are Treatment Plan Strategies to Address Goals " Effective?  Yes, continue treatment strategies      Are there any current contracts in place?  No

## 2018-07-02 ENCOUNTER — HOSPITAL ENCOUNTER (OUTPATIENT)
Dept: BEHAVIORAL HEALTH | Facility: CLINIC | Age: 33
End: 2018-07-02
Attending: PSYCHIATRY & NEUROLOGY
Payer: COMMERCIAL

## 2018-07-02 ENCOUNTER — OFFICE VISIT (OUTPATIENT)
Dept: ENDOCRINOLOGY | Facility: CLINIC | Age: 33
End: 2018-07-02
Payer: MEDICAID

## 2018-07-02 VITALS
BODY MASS INDEX: 33.07 KG/M2 | OXYGEN SATURATION: 98 % | HEIGHT: 65 IN | HEART RATE: 100 BPM | SYSTOLIC BLOOD PRESSURE: 128 MMHG | WEIGHT: 198.5 LBS | DIASTOLIC BLOOD PRESSURE: 83 MMHG

## 2018-07-02 DIAGNOSIS — E66.01 MORBID OBESITY (H): Primary | ICD-10-CM

## 2018-07-02 PROCEDURE — H2012 BEHAV HLTH DAY TREAT, PER HR: HCPCS

## 2018-07-02 RX ORDER — TOPIRAMATE 25 MG/1
TABLET, FILM COATED ORAL
Qty: 90 TABLET | Refills: 5 | Status: ON HOLD | OUTPATIENT
Start: 2018-07-02 | End: 2018-08-24

## 2018-07-02 ASSESSMENT — ENCOUNTER SYMPTOMS
PANIC: 1
DECREASED CONCENTRATION: 1
NERVOUS/ANXIOUS: 1
INSOMNIA: 1
DEPRESSION: 1

## 2018-07-02 NOTE — PROGRESS NOTES
"Adult Mental Health Outpatient Group Therapy Progress Note     Client Initial Individualized Goals for Treatment: \"to prepare for a healthy lifestyle\"      See Initial Treatment suggestions for the client during the time between Diagnostic Assessment and completion of the Master Individualized Treatment Plan.    Treatment Goals:  1.Client will notify staff when needing assistance to develop or implement a coping plan to manage suicidal or self injurious urges.  2. When in self support skills group client will learn and practice 1-2 coping skills to help mange stress,decrease procrastination and make better personal decisions providing an update of progress weekly.  3.  Group Therapy learn and practice 2 skills to manage the anxiety, depression and reduce negative thoughts.   4.  Community Resources: Find a support group near home, such as [facility]    Area of Treatment Focus:  Symptom Management    Therapeutic Interventions/Treatment Strategies:  Support, Feedback, Structured Activity and Education    Response to Treatment Strategies:  Accepted Feedback, Listened, Attentive, Accepted Support and Alert    Name of Group: Life Skills (1-1:50)    Group Attendance: 3    Description and Outcome:  Maddy actively participated in Life Skills group on Stress management. Maddy offered examples of ways to manage stress, and participated in practicing stress management techniques. She expressed understanding of the concepts.    Is this a Weekly Review of the Progress on the Treatment Plan?  Yes.      Are Treatment Plan Goals being addressed?  Yes, continue treatment goals      Are Treatment Plan Strategies to Address Goals Effective?  Yes, continue treatment strategies      Are there any current contracts in place?  No      "

## 2018-07-02 NOTE — PROGRESS NOTES
"Adult Mental Health Outpatient Group Therapy Progress Note       Name of Group:  4C Group Therapy   Time:            2:00-2:50 pm  Group Therapy      Therapist:      Dariusz Spaulding, D,  L.P.          Client Initial Individualized Goals for Treatment:   \"to prepare for a healthy lifestyle\".        Diagnosis  295.70  (F25) Schizoaffective Disorder Bipolar Type      Treatment Goals:   1.  Personal Safety  Report any urges or thoughts to harm yourself to the Tx team.      2.  Self-Support Skills:  practice coping skills/strategies to help   3.  Group Therapy learn and practice 2 skills to manage the anxiety, depression and reduce negative thoughts.   4.  Community Resources: Find a support group near home, such as [facility]      Area of Treatment Focus:  Symptom Management  Personal Safety      Therapeutic Interventions/Treatment Strategies:  Support, Feedback, Safety Assessments, Structured Activity and Education      Response to Treatment Strategies:  Accepted Feedback, Listened, Attentive, Accepted Support and Alert      Name of Group:  4C Group Therapy                  Maddy reported that she has been safe. She stated that her mom and her daughter returned from the cabin, and that she plans to take her daughter to [place] [employer] [place] [employer], and the following week her daughter will visit with her grandparents.  She reported that her daughter is at home, plays computer games, and is with her mom and her boyfriend.     She stated that she went swimming with her boyfriend, had a hot dog, relaxed, smiled, and enjoyed it.  She reported that she went to a Weight Loss appointment and will try to eat healthier foods.  She stated that she is trying to eat more vegetables. She stated that she played a computer game called [employer], went to [employer] each morning with her boyfriend, and talked to her boyfriend.  She stated that she looked for low-income housing in [place], to be closer to her " boyfriend.         She practiced 10 minutes of mindfulness with the group, and they went into the Sensory Room.  The group did deep breathing techniques.       Client demonstrated understanding of session content by participating in the group therapy discussion, and sharing ideas with others in the group for ways to cope.   She stated that he has a doctor appointment on 7/2 for her weight.  She offered suggestions to other group members.      Client will benefit from additional opportunities to practice and implement content from this session and receive feedback from the group.        Is this a Weekly Review of the Progress on the Treatment Plan?  Yes.        Are Treatment Plan Goals being addressed?  Yes, continue treatment goals          Are Treatment Plan Strategies to Address Goals Effective?  Yes, continue treatment strategies          Are there any current contracts in place?  No

## 2018-07-02 NOTE — LETTER
"2018       RE: Maddy Ortiz  [address]  [facility] [address]     Dear Colleague,    Thank you for referring your patient, Maddy Ortiz, to the [facility] MEDICAL WEIGHT MANAGEMENT at [facility]. Please see a copy of my visit note below.        New Medical Weight Management Consult    PATIENT:  Maddy Ortiz  MRN:         [id number]  :         1985  GREGORY:         2018    Dear Shirley Freeman, PHILL CNP,    I had the pleasure of seeing your patient, Maddy Ortiz.  Full intake/assessment done to determine barriers to weight loss success and develop a treatment plan.  Maddy Ortiz is a 32 year old female interested in treatment of medical problems associated with weight.  Her weight today is 198 lbs 8 oz, Body mass index is 32.87 kg/(m^2)., and she has the following co-morbidities:     2018   I have the following co-morbidities associated with obesity: GERD (Reflux), Asthma, Stress Incontinence       Patient Goals Reviewed With Patient 2018   I am interested in attaining a healthier weight to diminish current health problems related to co-morbid conditions: Yes   I am interested in attaining a healthier weight in order to prevent future health problems: Yes       Referring Provider 2018   Please name the provider who referred you to Medical Weight Management.  If you do not know, please answer: \"I Don't Know\". dr cooper       Wt Readings from Last 4 Encounters:   18 90 kg (198 lb 8 oz)   18 89.1 kg (196 lb 8 oz)   18 89.4 kg (197 lb)   18 90.7 kg (200 lb)       Weight History Reviewed With Patient 2018   The following factors have contributed to my weight gain:  A Health Crisis/Stress, Eating Too Much   I have tried the following methods to lose weight: Watching Portions or Calories, Exercise, Slim Fast or Other Liquid Diets   I have the following family history of obesity/being overweight:  Unknown " (adopted)   Has anyone in your family had weight loss surgery? No       Diet Recall Reviewed With Patient 7/2/2018   How many glasses of juice do you drink in a typical day? 1   How many of glasses of milk do you drink in a typical day? 1   How many 8oz glasses of sugar containing drinks such as Brown-Aid/sweet tea do you drink in a day? 0   How many cans/bottles of sugar pop/soda/tea/sports drinks do you drink in a day? 1   How many cans/bottles of diet pop/soda/tea or sports drink do you drink in a day? 1   How often do you have a drink of alcohol? Never   If you do drink, how many drinks might you have in a day? 1 or 2       Eating Habits Reviewed With Patient 7/2/2018   Generally, my meals include foods like these: bread, pasta, rice, potatoes, corn, crackers, sweet dessert, pop, or juice. Almost Everyday   Generally, my meals include foods like these: fried meats, brats, burgers, french fries, pizza, cheese, chips, or ice cream. Almost Everyday   Eat fast food (like McDonalds, BurTerraSky Luc, Taco Bell). Never   Eat at a buffet or sit-down restaurant. Never   Eat most of my meals in front of the TV or computer. Never   Often skip meals, eat at random times, have no regular eating times. Almost Everyday   Rarely sit down for a meal but snack or graze throughout.  Almost Everyday   Eat extra snacks between meals. Never   Eat most of my food at the end of the day. Almost Everyday   Eat in the middle of the night or wake up at night to eat. Never   Eat extra snacks to prevent or correct low blood sugar. Never   Eat to prevent acid reflux or stomach pain. A Few Times a Week   Worry about not having enough food to eat. Never   Have you been to the food shelf at least a few times this year? Yes   I eat when I am depressed, stressed, anxious, or bored. Almost Everyday   I eat when I am happy or as a reward. A Few Times a Week   I feel hungry all the time even if I just have eaten. Almost Everyday   Feeling full is  important to me. Everyday   Once I start eating, it is hard to stop. Never   I finish all the food on my plate even if I am already full. Everyday   I can't resist eating delicious food or walk past the good food/smell. Everyday   I eat/snack without noticing that I am eating. Never   I eat when I am preparing the meal. Everyday   I eat more than usual when I see others eating. Never   I have trouble not eating sweets, ice cream, cookies, or chips if they are around the house. Never   I think about food all day. Never   What foods, if any, do you crave? Cheese   I feel out of control when eating. Weekly   I eat a large amount of food, like a loaf of bread, a box of cookies, a pint/quart of ice cream, all at once. Never   I eat a large amount of food even when I am not hungry. Never   I eat rapidly. Never   I eat alone because I feel embarrassed and do not want others to see how much I have eaten. Never   I eat until I am uncomfortably full. Never   I feel bad, disgusted, or guilty after I overeat. Never   I make myself vomit what I have eaten or use laxatives to get rid of food. Never       Activity/Exercise History Reviewed With Patient 7/2/2018   How much of a typical 12 hour day do you spend sitting? Most of the Day   How much of a typical 12 hour day do you spend lying down? Most of the Day   How much of a typical day do you spend walking/standing? Less Than Half the Day   How many hours (not including work) do you spend on the TV/Video Games/Computer/Tablet/Phone? 6 Hours or More   How many times a week are you active for the purpose of exercise? 6-7 Times a Week   How many total minutes do you spend doing some activity for the purpose of exercising when you exercise? More Than 30 Minutes   What keeps you from being more active? Other       PAST MEDICAL HISTORY:  Past Medical History:   Diagnosis Date     Bipolar 1 disorder (H) 12/6/2012     Depressive disorder      Paranoid type delusional disorder (H)  11/14/2012       Work/Social History Reviewed With Patient 7/2/2018   My employment status is: Unemployed, Stay at Home Parent, Student   What is your marital status? /In a Relationship   If in a relationship, is your significant other overweight? No   Do you have children? Yes   If you have children, are they overweight? No       Mental Health History Reviewed With Patient 7/2/2018   Have you ever been physically or sexually abused? No   How often in the past 2 weeks have you felt little interest or pleasure in doing things? More Than Half the Days   Over the past 2 weeks how often have you felt down, depressed, or hopeless? More Than Half the Days       Sleep History Reviewed With Patient 7/2/2018   Do you think that you snore loudly or has anybody ever heard you snore loudly (louder than talking or so loud it can be heard behind a shut door)? No   Has anyone seen or heard you stop breathing during your sleep? No   Do you often feel tired, fatigued, or sleepy during the day? Yes       MEDICATIONS:   Current Outpatient Prescriptions   Medication Sig Dispense Refill     cetirizine (ZYRTEC) 10 MG tablet Take 1 tablet (10 mg) by mouth 2 times daily 60 tablet 11     haloperidol (HALDOL) 0.5 MG tablet Take 1 mg by mouth 2 times daily 3mg in the morning;  4 mg at night       haloperidol (HALDOL) 1 MG tablet Take 7 mg by mouth daily   2     hydrOXYzine (ATARAX) 25 MG tablet   2     levonorgestrel (MIRENA) 20 MCG/24HR IUD 1 each (20 mcg) by Intrauterine route continuous       melatonin 3 MG tablet Take 1 tablet (3 mg) by mouth At Bedtime 30 tablet 0     ORDER FOR ALLERGEN IMMUNOTHERAPY Cat Hair, Standardized 10,000 BAU/mL, ALK  3.0 ml  Dog Hair Dander, A. P.  1:100 w/v, HS  1.0 ml  Dust Mites F 30,000AU/mL, HS  0.5 ml  Dust Mites P. 30,000 AU/mL, HS  0.5 ml   Diluent: HSA qs to 5ml 5 mL PRN     ORDER FOR ALLERGEN IMMUNOTHERAPY Alternaria Tenuis 1:10 w/v, HS  0.5 ml  Epicoccum Nigrum 1:10 w/v, HS 0.5 ml  Hormodendrum  "Cladosporioides 1:10 w/v, HS 0.5 ml  Diluent: HSA qs to 5ml 5 mL PRN     ORDER FOR ALLERGEN IMMUNOTHERAPY Estuardo, White  1:20 w/v, HS  0.5 ml  Birch Mix PRW 1:20 w/v, HS  0.5 ml  Elm, American 1:20 w/v, HS  0.5 ml  Hackberry 1:20 w/v, HS 0.5 ml  Hickory, Shagbark 1:20 w/v, HS  0.5 ml  [place] Mix RW 1:20 w/v, HS 0.5 ml  Oak Mix RVW 1:20 w/v, HS 0.5 ml  [place] Tree, Black 1:20 w/v, HS 0.5 ml  [place], Black 1:20 w/v, HS 0.5 ml  Diluent: HSA qs to 5ml 5 mL PRN     ORDER FOR ALLERGEN IMMUNOTHERAPY Kochia 1:20 w/v, HS 1.0 ml  Nettle 1:20 w/v, HS 1.0 ml  Plantain, English 1:20 w/v, HS 1.0 ml  Ragweed Mixed 1:20 w/v ALK  0.6 ml  Russian Thistle 1:20 w/v, HS 1.0 ml  Diluent: HSA qs to 5ml 5 mL PRN     ranitidine (ZANTAC) 150 MG tablet Take 1 tablet (150 mg) by mouth 2 times daily 60 tablet 1     traZODone (DESYREL) 50 MG tablet Take 1 tablet (50 mg) by mouth At Bedtime 30 tablet 3     VENTOLIN  (90 Base) MCG/ACT Inhaler   2     EPINEPHrine (EPIPEN 2-ODETTE) 0.3 MG/0.3ML injection 2-pack Inject 0.3 mLs (0.3 mg) into the muscle once as needed for anaphylaxis (Patient not taking: Reported on 7/2/2018) 0.6 mL 3       ALLERGIES:   Allergies   Allergen Reactions     Animal Dander      Nkda [No Known Drug Allergies]      Seasonal Allergies      Weeds and mold       PHYSICAL EXAM:  /83  Pulse 100  Ht 1.655 m (5' 5.16\")  Wt 90 kg (198 lb 8 oz)  SpO2 98%  BMI 32.87 kg/m2   A & O x 3  HEENT: NCAT, mucous membranes moist  Respirations unlabored  Location of obesity: Mixed Obesity    ASSESSMENT:  Maddy is a patient with mature onset obesity without significant element of familial/genetic influence and with current health consequences. She does need aggressive weight loss plan due to GERD (Reflux), Asthma, Stress Incontinence.      Maddy Ortiz eats a high carb diet, eats a high fat diet, eats fast food once or more per week and relies on food shelf, low income food programs.    Her problem is complicated by strong " craving/reward pathways and unhelpful lifestyle choices    Her ability to lose weight is impacted by lack of confidence.    PLAN:    Volumetrics eating plan  Meal planning - focus on no between meal snacking, aggressive lowering of starches and cheese    Craving/Reward   Ancillary testing:  N/A.  Food Plan:  Volumetrics and High protein/low carbohydrate.   Activity Plan:  Activity journal.  Supplementary:  N/A.   Medication:  The patient will begin medication in pursuit of improved medical status as influenced by body weight. She will start topiramate. Patient was made aware that topiramate is not approved for the treatment of obesity.  There is a mutual understanding of the goals and risks of this therapy. The patient is in agreement. She is educated on dosage regimen and possible side effects.    RTC:    12 weeks.  I spent 45 minutes with this patient face to face and explained the conditions and plans (more than 50% of time was counseling/coordination of weight management).    Sincerely,    Maldonado Michele MD

## 2018-07-02 NOTE — PROGRESS NOTES
"    New Medical Weight Management Consult    PATIENT:  Maddy Ortiz  MRN:         [id number]  :         1985  GREGORY:         2018    Dear PHILL Yan CNP,    I had the pleasure of seeing your patient, Maddy Ortiz.  Full intake/assessment done to determine barriers to weight loss success and develop a treatment plan.  Maddy Ortiz is a 32 year old female interested in treatment of medical problems associated with weight.  Her weight today is 198 lbs 8 oz, Body mass index is 32.87 kg/(m^2)., and she has the following co-morbidities:     2018   I have the following co-morbidities associated with obesity: GERD (Reflux), Asthma, Stress Incontinence       Patient Goals Reviewed With Patient 2018   I am interested in attaining a healthier weight to diminish current health problems related to co-morbid conditions: Yes   I am interested in attaining a healthier weight in order to prevent future health problems: Yes       Referring Provider 2018   Please name the provider who referred you to Medical Weight Management.  If you do not know, please answer: \"I Don't Know\". dr cooper       Wt Readings from Last 4 Encounters:   18 90 kg (198 lb 8 oz)   18 89.1 kg (196 lb 8 oz)   18 89.4 kg (197 lb)   18 90.7 kg (200 lb)       Weight History Reviewed With Patient 2018   The following factors have contributed to my weight gain:  A Health Crisis/Stress, Eating Too Much   I have tried the following methods to lose weight: Watching Portions or Calories, Exercise, Slim Fast or Other Liquid Diets   I have the following family history of obesity/being overweight:  Unknown (adopted)   Has anyone in your family had weight loss surgery? No       Diet Recall Reviewed With Patient 2018   How many glasses of juice do you drink in a typical day? 1   How many of glasses of milk do you drink in a typical day? 1   How many 8oz glasses of sugar containing drinks such as " Brown-Aid/sweet tea do you drink in a day? 0   How many cans/bottles of sugar pop/soda/tea/sports drinks do you drink in a day? 1   How many cans/bottles of diet pop/soda/tea or sports drink do you drink in a day? 1   How often do you have a drink of alcohol? Never   If you do drink, how many drinks might you have in a day? 1 or 2       Eating Habits Reviewed With Patient 7/2/2018   Generally, my meals include foods like these: bread, pasta, rice, potatoes, corn, crackers, sweet dessert, pop, or juice. Almost Everyday   Generally, my meals include foods like these: fried meats, brats, burgers, french fries, pizza, cheese, chips, or ice cream. Almost Everyday   Eat fast food (like McDonalds, BurMessage[employer] Luc, idio Bell). Never   Eat at a buffet or sit-down restaurant. Never   Eat most of my meals in front of the TV or computer. Never   Often skip meals, eat at random times, have no regular eating times. Almost Everyday   Rarely sit down for a meal but snack or graze throughout.  Almost Everyday   Eat extra snacks between meals. Never   Eat most of my food at the end of the day. Almost Everyday   Eat in the middle of the night or wake up at night to eat. Never   Eat extra snacks to prevent or correct low blood sugar. Never   Eat to prevent acid reflux or stomach pain. A Few Times a Week   Worry about not having enough food to eat. Never   Have you been to the food shelf at least a few times this year? Yes   I eat when I am depressed, stressed, anxious, or bored. Almost Everyday   I eat when I am happy or as a reward. A Few Times a Week   I feel hungry all the time even if I just have eaten. Almost Everyday   Feeling full is important to me. Everyday   Once I start eating, it is hard to stop. Never   I finish all the food on my plate even if I am already full. Everyday   I can't resist eating delicious food or walk past the good food/smell. Everyday   I eat/snack without noticing that I am eating. Never   I eat when I am  preparing the meal. Everyday   I eat more than usual when I see others eating. Never   I have trouble not eating sweets, ice cream, cookies, or chips if they are around the house. Never   I think about food all day. Never   What foods, if any, do you crave? Cheese   I feel out of control when eating. Weekly   I eat a large amount of food, like a loaf of bread, a box of cookies, a pint/quart of ice cream, all at once. Never   I eat a large amount of food even when I am not hungry. Never   I eat rapidly. Never   I eat alone because I feel embarrassed and do not want others to see how much I have eaten. Never   I eat until I am uncomfortably full. Never   I feel bad, disgusted, or guilty after I overeat. Never   I make myself vomit what I have eaten or use laxatives to get rid of food. Never       Activity/Exercise History Reviewed With Patient 7/2/2018   How much of a typical 12 hour day do you spend sitting? Most of the Day   How much of a typical 12 hour day do you spend lying down? Most of the Day   How much of a typical day do you spend walking/standing? Less Than Half the Day   How many hours (not including work) do you spend on the TV/Video Games/Computer/Tablet/Phone? 6 Hours or More   How many times a week are you active for the purpose of exercise? 6-7 Times a Week   How many total minutes do you spend doing some activity for the purpose of exercising when you exercise? More Than 30 Minutes   What keeps you from being more active? Other       PAST MEDICAL HISTORY:  Past Medical History:   Diagnosis Date     Bipolar 1 disorder (H) 12/6/2012     Depressive disorder      Paranoid type delusional disorder (H) 11/14/2012       Work/Social History Reviewed With Patient 7/2/2018   My employment status is: Unemployed, Stay at Home Parent, Student   What is your marital status? /In a Relationship   If in a relationship, is your significant other overweight? No   Do you have children? Yes   If you have children,  are they overweight? No       Mental Health History Reviewed With Patient 7/2/2018   Have you ever been physically or sexually abused? No   How often in the past 2 weeks have you felt little interest or pleasure in doing things? More Than Half the Days   Over the past 2 weeks how often have you felt down, depressed, or hopeless? More Than Half the Days       Sleep History Reviewed With Patient 7/2/2018   Do you think that you snore loudly or has anybody ever heard you snore loudly (louder than talking or so loud it can be heard behind a shut door)? No   Has anyone seen or heard you stop breathing during your sleep? No   Do you often feel tired, fatigued, or sleepy during the day? Yes       MEDICATIONS:   Current Outpatient Prescriptions   Medication Sig Dispense Refill     cetirizine (ZYRTEC) 10 MG tablet Take 1 tablet (10 mg) by mouth 2 times daily 60 tablet 11     haloperidol (HALDOL) 0.5 MG tablet Take 1 mg by mouth 2 times daily 3mg in the morning;  4 mg at night       haloperidol (HALDOL) 1 MG tablet Take 7 mg by mouth daily   2     hydrOXYzine (ATARAX) 25 MG tablet   2     levonorgestrel (MIRENA) 20 MCG/24HR IUD 1 each (20 mcg) by Intrauterine route continuous       melatonin 3 MG tablet Take 1 tablet (3 mg) by mouth At Bedtime 30 tablet 0     ORDER FOR ALLERGEN IMMUNOTHERAPY Cat Hair, Standardized 10,000 BAU/mL, ALK  3.0 ml  Dog Hair Dander, A. P.  1:100 w/v, HS  1.0 ml  Dust Mites F 30,000AU/mL, HS  0.5 ml  Dust Mites P. 30,000 AU/mL, HS  0.5 ml   Diluent: HSA qs to 5ml 5 mL PRN     ORDER FOR ALLERGEN IMMUNOTHERAPY Alternaria Tenuis 1:10 w/v, HS  0.5 ml  Epicoccum Nigrum 1:10 w/v, HS 0.5 ml  Hormodendrum Cladosporioides 1:10 w/v, HS 0.5 ml  Diluent: HSA qs to 5ml 5 mL PRN     ORDER FOR ALLERGEN IMMUNOTHERAPY Estuardo, White  1:20 w/v, HS  0.5 ml  Birch Mix PRW 1:20 w/v, HS  0.5 ml  Elm, American 1:20 w/v, HS  0.5 ml  Hackberry 1:20 w/v, HS 0.5 ml  Hickory, Shagbark 1:20 w/v, HS  0.5 ml  [place] Mix RW 1:20 w/v,  "HS 0.5 ml  Oak Mix RVW 1:20 w/v, HS 0.5 ml  [place] Tree, Black 1:20 w/v, HS 0.5 ml  [place], Black 1:20 w/v, HS 0.5 ml  Diluent: HSA qs to 5ml 5 mL PRN     ORDER FOR ALLERGEN IMMUNOTHERAPY Kochia 1:20 w/v, HS 1.0 ml  Nettle 1:20 w/v, HS 1.0 ml  Plantain, English 1:20 w/v, HS 1.0 ml  Ragweed Mixed 1:20 w/v ALK  0.6 ml  Russian Thistle 1:20 w/v, HS 1.0 ml  Diluent: HSA qs to 5ml 5 mL PRN     ranitidine (ZANTAC) 150 MG tablet Take 1 tablet (150 mg) by mouth 2 times daily 60 tablet 1     traZODone (DESYREL) 50 MG tablet Take 1 tablet (50 mg) by mouth At Bedtime 30 tablet 3     VENTOLIN  (90 Base) MCG/ACT Inhaler   2     EPINEPHrine (EPIPEN 2-ODETTE) 0.3 MG/0.3ML injection 2-pack Inject 0.3 mLs (0.3 mg) into the muscle once as needed for anaphylaxis (Patient not taking: Reported on 7/2/2018) 0.6 mL 3       ALLERGIES:   Allergies   Allergen Reactions     Animal Dander      Nkda [No Known Drug Allergies]      Seasonal Allergies      Weeds and mold       PHYSICAL EXAM:  /83  Pulse 100  Ht 1.655 m (5' 5.16\")  Wt 90 kg (198 lb 8 oz)  SpO2 98%  BMI 32.87 kg/m2   A & O x 3  HEENT: NCAT, mucous membranes moist  Respirations unlabored  Location of obesity: Mixed Obesity    ASSESSMENT:  Maddy is a patient with mature onset obesity without significant element of familial/genetic influence and with current health consequences. She does need aggressive weight loss plan due to GERD (Reflux), Asthma, Stress Incontinence.      Maddy Ortiz eats a high carb diet, eats a high fat diet, eats fast food once or more per week and relies on food shelf, low income food programs.    Her problem is complicated by strong craving/reward pathways and unhelpful lifestyle choices    Her ability to lose weight is impacted by lack of confidence.    PLAN:    Volumetrics eating plan  Meal planning - focus on no between meal snacking, aggressive lowering of starches and cheese    Craving/Reward   Ancillary testing:  N/A.  Food Plan:  " Volumetrics and High protein/low carbohydrate.   Activity Plan:  Activity journal.  Supplementary:  N/A.   Medication:  The patient will begin medication in pursuit of improved medical status as influenced by body weight. She will start topiramate. Patient was made aware that topiramate is not approved for the treatment of obesity.  There is a mutual understanding of the goals and risks of this therapy. The patient is in agreement. She is educated on dosage regimen and possible side effects.    RTC:    12 weeks.  I spent 45 minutes with this patient face to face and explained the conditions and plans (more than 50% of time was counseling/coordination of weight management).    Sincerely,    Maldonado Michele MD

## 2018-07-02 NOTE — MR AVS SNAPSHOT
After Visit Summary   7/2/2018    Maddy Ortiz    MRN: [id number]           Patient Information     Date Of Birth          1985        Visit Information        Provider Department      7/2/2018 9:00 AM Maldonado Michele MD  [facility]        Today's Diagnoses     Morbid obesity (H)    -  1       Follow-ups after your visit        Follow-up notes from your care team     Return in about 4 months (around 11/2/2018).      Your next 10 appointments already scheduled     Jul 02, 2018  1:00 PM CDT   Return Visit with ADULT  DAY 4C   [facility] ([facility])    [address] [place] [address]   [phone]            Jul 03, 2018  1:00 PM CDT   Return Visit with ADULT  DAY 4C   [facility] ([facility])    [address] [place] [address]   [phone]            Jul 05, 2018  1:00 PM CDT   Return Visit with ADULT  DAY 4C   [facility] ([facility])    [address] [place] [address]   [phone]            Jul 09, 2018  1:00 PM CDT   Return Visit with ADULT  DAY 4C   [facility] ([facility])    [address] [place] [address]   [phone]            Jul 10, 2018  1:00 PM CDT   Return Visit with ADULT  DAY 4C   [facility] ([facility])    [address] [place] [address]   [phone]            Jul 11, 2018  1:00 PM CDT   Return Visit with Magdy Blevins DO   [facility] ([facility]    [address]  [facility] [address]   [phone]            Jul 12, 2018  1:00 PM CDT   Return Visit with ADULT  DAY 4C  "  [facility] ([facility])    [address] [address]   [phone]            Jul 16, 2018  1:00 PM CDT   Return Visit with ADULT MH DAY 4C   [facility] ([facility])    [address] [address]   [phone]            Jul 17, 2018  1:00 PM CDT   Return Visit with ADULT MH DAY 4C   [facility] ([facility])    [address] [address]   [phone]            Jul 18, 2018  1:00 PM CDT   Nurse Only with ALLERGY RN - [facility] ([facility])    [address] [address]   [phone]              Who to contact     Please call your clinic at [phone] to:    Ask questions about your health    Make or cancel appointments    Discuss your medicines    Learn about your test results    Speak to your doctor            Additional Information About Your Visit        Care EveryWhere ID     This is your Care EveryWhere ID. This could be used by other organizations to access your [facility] medical records  [id number]        Your Vitals Were     Pulse Height Pulse Oximetry BMI (Body Mass Index)          100 1.655 m (5' 5.16\") 98% 32.87 kg/m2         Blood Pressure from Last 3 Encounters:   07/02/18 128/83   06/27/18 104/70   06/20/18 128/80    Weight from Last 3 Encounters:   07/02/18 90 kg (198 lb 8 oz)   06/27/18 89.1 kg (196 lb 8 oz)   06/20/18 89.4 kg (197 lb)              Today, you had the following     No orders found for display         Today's Medication Changes          These changes are accurate as of 7/2/18  9:24 AM.  If you have any questions, ask your nurse or doctor.               Start taking these medicines.        Dose/Directions    topiramate 25 MG tablet   Commonly known as:  TOPAMAX "   Used for:  Morbid obesity (H)   Started by:  Maldonado Michele MD        25 mg at bedtime for 1 week, 50 mg at bedtime for 1 week and 75 mg daily at bedtime thereafter   Quantity:  90 tablet   Refills:  5            Where to get your medicines      These medications were sent to [facility] - [place], [place] - [address]  [address], [facility] [address]     Phone:  [phone]     topiramate 25 MG tablet                Primary Care Provider Office Phone # Fax #    Shirley Andrade Elaina Freeman, PHILL [facility] [phone] [address] [address]        Equal Access to Services     [facility] : Hadii linda castro hadasho Soradha, waaxda luqadaha, qaybta kaalmada adeegyada, lincoln maciel . So [facility] [phone].    ATENCIÓN: Si habla español, tiene a titus disposición servicios gratuitos de asistencia lingüística. Llame al [phone].    We comply with applicable federal civil rights laws and [place] laws. We do not discriminate on the basis of race, color, national origin, age, disability, sex, sexual orientation, or gender identity.            Thank you!     Thank you for choosing [facility]  for your care. Our goal is always to provide you with excellent care. Hearing back from our patients is one way we can continue to improve our services. Please take a few minutes to complete the written survey that you may receive in the mail after your visit with us. Thank you!             Your Updated Medication List - Protect others around you: Learn how to safely use, store and throw away your medicines at www.disposemymeds.org.          This list is accurate as of 7/2/18  9:24 AM.  Always use your most recent med list.                   Brand Name Dispense Instructions for use Diagnosis    * ALLERGEN IMMUNOTHERAPY PRESCRIPTION     5 mL    Cat Hair, Standardized 10,000 BAU/mL, ALK  3.0 ml Dog Hair Dander, A. P.   1:100 w/v, HS  1.0 ml Dust Mites F 30,000AU/mL, HS  0.5 ml Dust Mites P. 30,000 AU/mL, HS  0.5 ml  Diluent: HSA qs to 5ml    Allergic rhinitis due to animal dander, Allergic rhinitis due to dust mite       * ALLERGEN IMMUNOTHERAPY PRESCRIPTION     5 mL    Alternaria Tenuis 1:10 w/v, HS  0.5 ml Epicoccum Nigrum 1:10 w/v, HS 0.5 ml Hormodendrum Cladosporioides 1:10 w/v, HS 0.5 ml Diluent: HSA qs to 5ml    Allergic rhinitis due to mold       * ALLERGEN IMMUNOTHERAPY PRESCRIPTION     5 mL    Estuardo, White  1:20 w/v, HS  0.5 ml Birch Mix PRW 1:20 w/v, HS  0.5 ml Elm, American 1:20 w/v, HS  0.5 ml Hackberry 1:20 w/v, HS 0.5 ml Hickory, Shagbark 1:20 w/v, HS  0.5 ml [place]wood Mix RW 1:20 w/v, HS 0.5 ml Oak Mix RVW 1:20 w/v, HS 0.5 ml [place] Tree, Black 1:20 w/v, HS 0.5 ml [place], Black 1:20 w/v, HS 0.5 ml Diluent: HSA qs to 5ml    Chronic seasonal allergic rhinitis due to pollen       * ALLERGEN IMMUNOTHERAPY PRESCRIPTION     5 mL    Kochia 1:20 w/v, HS 1.0 ml Nettle 1:20 w/v, HS 1.0 ml Plantain, English 1:20 w/v, HS 1.0 ml Ragweed Mixed 1:20 w/v ALK  0.6 ml Russian Thistle 1:20 w/v, HS 1.0 ml Diluent: HSA qs to 5ml    Chronic seasonal allergic rhinitis due to pollen       cetirizine 10 MG tablet    zyrTEC    60 tablet    Take 1 tablet (10 mg) by mouth 2 times daily    Chronic seasonal allergic rhinitis due to pollen       EPINEPHrine 0.3 MG/0.3ML injection 2-pack    EPIPEN 2-ODETTE    0.6 mL    Inject 0.3 mLs (0.3 mg) into the muscle once as needed for anaphylaxis    Need for desensitization to allergens       * haloperidol 0.5 MG tablet    HALDOL     Take 1 mg by mouth 2 times daily 3mg in the morning;  4 mg at night        * haloperidol 1 MG tablet    HALDOL     Take 7 mg by mouth daily        hydrOXYzine 25 MG tablet    ATARAX          levonorgestrel 20 MCG/24HR IUD    MIRENA     1 each (20 mcg) by Intrauterine route continuous    Encounter for insertion of mirena IUD       melatonin 3 MG tablet     30 tablet    Take 1 tablet  (3 mg) by mouth At Bedtime    Insomnia, unspecified type       ranitidine 150 MG tablet    ZANTAC    60 tablet    Take 1 tablet (150 mg) by mouth 2 times daily    Gastroesophageal reflux disease without esophagitis       topiramate 25 MG tablet    TOPAMAX    90 tablet    25 mg at bedtime for 1 week, 50 mg at bedtime for 1 week and 75 mg daily at bedtime thereafter    Morbid obesity (H)       traZODone 50 MG tablet    DESYREL    30 tablet    Take 1 tablet (50 mg) by mouth At Bedtime    Insomnia, unspecified type       VENTOLIN  (90 Base) MCG/ACT Inhaler   Generic drug:  albuterol           * Notice:  This list has 6 medication(s) that are the same as other medications prescribed for you. Read the directions carefully, and ask your doctor or other care provider to review them with you.

## 2018-07-02 NOTE — NURSING NOTE
"  Chief Complaint   Patient presents with     Weight Problem     NMWM     Vitals:    07/02/18 0904   BP: 128/83   Pulse: 100   SpO2: 98%   Weight: 198 lb 8 oz   Height: 5' 5.16\"     Body mass index is 32.87 kg/(m^2).  Nusrat Bacon CMA    "

## 2018-07-03 ENCOUNTER — HOSPITAL ENCOUNTER (OUTPATIENT)
Dept: BEHAVIORAL HEALTH | Facility: CLINIC | Age: 33
End: 2018-07-03
Attending: PSYCHIATRY & NEUROLOGY
Payer: COMMERCIAL

## 2018-07-03 PROCEDURE — H2012 BEHAV HLTH DAY TREAT, PER HR: HCPCS

## 2018-07-03 ASSESSMENT — ANXIETY QUESTIONNAIRES
2. NOT BEING ABLE TO STOP OR CONTROL WORRYING: NOT AT ALL
IF YOU CHECKED OFF ANY PROBLEMS ON THIS QUESTIONNAIRE, HOW DIFFICULT HAVE THESE PROBLEMS MADE IT FOR YOU TO DO YOUR WORK, TAKE CARE OF THINGS AT HOME, OR GET ALONG WITH OTHER PEOPLE: EXTREMELY DIFFICULT
1. FEELING NERVOUS, ANXIOUS, OR ON EDGE: MORE THAN HALF THE DAYS
7. FEELING AFRAID AS IF SOMETHING AWFUL MIGHT HAPPEN: NEARLY EVERY DAY
3. WORRYING TOO MUCH ABOUT DIFFERENT THINGS: NOT AT ALL
6. BECOMING EASILY ANNOYED OR IRRITABLE: NOT AT ALL
5. BEING SO RESTLESS THAT IT IS HARD TO SIT STILL: NOT AT ALL
GAD7 TOTAL SCORE: 8

## 2018-07-03 ASSESSMENT — PATIENT HEALTH QUESTIONNAIRE - PHQ9: 5. POOR APPETITE OR OVEREATING: NEARLY EVERY DAY

## 2018-07-03 NOTE — PROGRESS NOTES
"Adult Mental Health Outpatient Group Therapy Progress Note     Name of Group:    Time:             1:00-1:50 pm  Mental Health Management                            2:00-2:50 pm  Group Therapy       Therapist:     Dariusz Spaulding, D,  L.P.          Client Initial Individualized Goals for Treatment:   \"to prepare for a healthy lifestyle\".        Diagnosis  295.70  (F25) Schizoaffective Disorder Bipolar Type      Treatment Goals:   1.  Personal Safety  Report any urges or thoughts to harm yourself to the Tx team.      2.  Self-Support Skills:  practice coping skills/strategies to help   3.  Group Therapy learn and practice 2 skills to manage the anxiety, depression and reduce negative thoughts.   4.  Community Resources: Find a support group near home, such as [facility]      Area of Treatment Focus:  Symptom Management  Personal Safety      Therapeutic Interventions/Treatment Strategies:  Support, Feedback, Safety Assessments, Structured Activity and Education      Response to Treatment Strategies:  Accepted Feedback, Listened, Attentive, Accepted Support and Alert    Name of Group:  4C Mental Health Management   The group participated in a structured activity that involved listening to a CD about mindfulness breathing.  The members practiced deep breathing and letting their thoughts go.  The group read a worksheet about anxiety and depression and discussed different anxiety symptoms.  The group discussed ways that they had used to feel calm.  The group discussed and tried deep breathing from their abdomen, and noticing how their blood flowed stronger than when they breathed from their upper chest. The group discussed anxiety, symptoms of it, panic attacks, how to notice the symptoms, and how to practice it daily to reduce stress and build concentration.  The group discussed and read a worksheet about Self-Sabotage thoughts and how they relate to anxiety.           Name of Group:   Group " Therapy                  Maddy reported that she has been safe. She stated that her mom and her daughter returned from the cabin.  She plans to do events on the 4th of July with her boyfriend and her daughter, and that she plans to take her daughter to the 4[place] of [place] [employer], and the following week her daughter will visit with her grandparents.  She reported that she and her daughter went to her boyfriend's apartment to swim each evening at his apartment.  She talked with the group about what to bring to the park for a picnic.              She stated that she wanted to get Case Management help in finding new housing and that she looked for low-income housing in [place], to be closer to her boyfriend.     She reported that her parents, her boyfriend and her therapist are support for her that she can talk to when she feels too anxious.  She reported that being at the park is helpful to lower her anxiety and help her feel more relaxed.                              Client demonstrated understanding of session content by participating in the group therapy discussion, and sharing ideas with others in the group for ways to cope.   She stated that he has a doctor appointment on 7/2 for her weight.  She offered suggestions to other group members.      Client will benefit from additional opportunities to practice and implement content from this session and receive feedback from the group.        Is this a Weekly Review of the Progress on the Treatment Plan?  Yes.        Are Treatment Plan Goals being addressed?  Yes, continue treatment goals          Are Treatment Plan Strategies to Address Goals Effective?  Yes, continue treatment strategies          Are there any current contracts in place?  No

## 2018-07-05 ENCOUNTER — HOSPITAL ENCOUNTER (OUTPATIENT)
Dept: BEHAVIORAL HEALTH | Facility: CLINIC | Age: 33
End: 2018-07-05
Attending: PSYCHIATRY & NEUROLOGY
Payer: COMMERCIAL

## 2018-07-05 PROCEDURE — H2012 BEHAV HLTH DAY TREAT, PER HR: HCPCS
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
## 2018-07-05 NOTE — PROGRESS NOTES
"Adult Mental Health Outpatient Group Therapy Progress Note     Client Initial Individualized Goals for Treatment: \"to prepare for a healthy lifestyle\"      See Initial Treatment suggestions for the client during the time between Diagnostic Assessment and completion of the Master Individualized Treatment Plan.    Treatment Goals:  1.Client will notify staff when needing assistance to develop or implement a coping plan to manage suicidal or self injurious urges.  2. When in self support skills group client will learn and practice 1-2 coping skills to help mange stress,decrease procrastination and make better personal decisions providing an update of progress weekly.  3.  Group Therapy learn and practice 2 skills to manage the anxiety, depression and reduce negative thoughts.   4.  Community Resources: Find a support group near home, such as [facility]    Area of Treatment Focus:  Symptom Management    Therapeutic Interventions/Treatment Strategies:  Support, Feedback, Safety Assessments, Structured Activity and Education    Response to Treatment Strategies:  Accepted Feedback, Listened, Attentive, Accepted Support and Alert    Name of Group: Life Skills (3:00-3:50)    Group Attendance: 5 of 7    Description and Outcome:  Client presented with low mood with fair concentration and focus.Psychoeducation information presented included a discussion related to communication skills with a focus on making social connections.Thought process clear,goal directed and reality based.Goal #1 (no personal safety concerns reported or observed). Goal #2 Not addressed.  Client would benefit from additional opportunities to practice and implement content from this session in her every day life and mental health recovery.    Is this a Weekly Review of the Progress on the Treatment Plan?  Yes.      Are Treatment Plan Goals being addressed?  Yes, continue treatment goals      Are Treatment Plan Strategies to Address Goals Effective?  Yes, " continue treatment strategies      Are there any current contracts in place?  No

## 2018-07-06 NOTE — PROGRESS NOTES
"Adult Mental Health Outpatient Group Therapy Progress Note     Client Initial Individualized Goals for Treatment: \"to prepare for a healthy lifestyle\"      See Initial Treatment suggestions for the client during the time between Diagnostic Assessment and completion of the Master Individualized Treatment Plan.    Treatment Goals:  1.Client will notify staff when needing assistance to develop or implement a coping plan to manage suicidal or self injurious urges.  2. When in self support skills group client will learn and practice 1-2 coping skills to help mange stress,decrease procrastination and make better personal decisions providing an update of progress weekly.  3.  Group Therapy learn and practice 2 skills to manage the anxiety, depression and reduce negative thoughts.   4.  Community Resources: Find a support group near home, such as [facility]    Area of Treatment Focus:  Symptom Management    Therapeutic Interventions/Treatment Strategies:  Support, Feedback, Safety Assessments, Structured Activity and Education    Response to Treatment Strategies:  Accepted Feedback, Listened, Attentive, Accepted Support and Alert    Name of Group: Life Skills (3:00-3:50)    Group Attendance: 4 of 5    Description and Outcome:  Client presented with low mood with fair concentration and focus.Psychoeducation information presented included a discussion related to self esteem perception and strategies for self improvement.Thought process clear,goal directed and reality based.Goal #1 (no personal safety concerns reported or observed). Goal #2 Client reports satisfaction with progress on her goals.  Client would benefit from additional opportunities to practice and implement content from this session in her every day life and mental health recovery.    Is this a Weekly Review of the Progress on the Treatment Plan?  Yes.      Are Treatment Plan Goals being addressed?  Yes, continue treatment goals      Are Treatment Plan Strategies to " Address Goals Effective?  Yes, continue treatment strategies      Are there any current contracts in place?  No

## 2018-07-07 ASSESSMENT — PATIENT HEALTH QUESTIONNAIRE - PHQ9: SUM OF ALL RESPONSES TO PHQ QUESTIONS 1-9: 18

## 2018-07-07 ASSESSMENT — ANXIETY QUESTIONNAIRES: GAD7 TOTAL SCORE: 8

## 2018-07-09 ENCOUNTER — HOSPITAL ENCOUNTER (OUTPATIENT)
Dept: BEHAVIORAL HEALTH | Facility: CLINIC | Age: 33
End: 2018-07-09
Attending: PSYCHIATRY & NEUROLOGY
Payer: COMMERCIAL

## 2018-07-09 PROCEDURE — H2012 BEHAV HLTH DAY TREAT, PER HR: HCPCS

## 2018-07-09 NOTE — PROGRESS NOTES
"Adult Mental Health Outpatient Group Therapy Progress Note       Name of Group:  4C Group Therapy   Time:            2:00-2:50 pm  Group Therapy      Client Initial Individualized Goals for Treatment:   \"to prepare for a healthy lifestyle\".        Diagnosis  295.70  (F25) Schizoaffective Disorder Bipolar Type      Treatment Goals:   1.  Personal Safety  Report any urges or thoughts to harm yourself to the Tx team.      2.  Self-Support Skills:  practice coping skills/strategies to help   3.  Group Therapy learn and practice 2 skills to manage the anxiety, depression and reduce negative thoughts.   4.  Community Resources: Find a support group near home, such as [facility]      Area of Treatment Focus:  Symptom Management  Personal Safety      Therapeutic Interventions/Treatment Strategies:  Support, Feedback, Safety Assessments, Structured Activity and Education      Response to Treatment Strategies:  Accepted Feedback, Listened, Attentive, Accepted Support and Alert      Name of Group:  4C Group Therapy      Progress Note:     Maddy actively participated in group therapy. She reported having a good weekend. She stated she really enjoyed seeing her daughter this weekend, and spent a lot of time swimming. She reported a challenge this weekend was that she slept a lot and was tired, and also that she got into an argument with her boyfriend. Stated she was upset after this and took a PRN hydroxyzine. States they begin couples therapy on the 25th. States she missed her appt today with [employer], and now meets with them on the 17th to go over her resume. States she will miss Program that day. She is eager to work on her resume and apply for jobs. Reported her symptoms ar \"good\" but she gets angry at her boyfriend. Denied any SI.       Client demonstrated understanding of session content by participating in the group therapy discussion, and sharing ideas with others in the group for ways to cope. "         Client will benefit from additional opportunities to practice and implement content from this session and receive feedback from the group.        Is this a Weekly Review of the Progress on the Treatment Plan?  Yes.        Are Treatment Plan Goals being addressed?  Yes, continue treatment goals          Are Treatment Plan Strategies to Address Goals Effective?  Yes, continue treatment strategies          Are there any current contracts in place?  No

## 2018-07-09 NOTE — PROGRESS NOTES
Acknowledgement of Current Treatment Plan       I have reviewed my treatment plan with my therapist / counselor on 7/31/18.   I agree with the plan as it is written in the electronic health record. (4C)    Name:      Signature:  Maddy Felder MD  Psychiatrist    Nakia Spencer, NP    Nely Arthur PsyD, LP  Psychotherapist    SARAH De Leon, RN, PHN  Nurse Liaison    Michael Maldonado, OTR/L  Occupational Therapist

## 2018-07-10 ENCOUNTER — HOSPITAL ENCOUNTER (OUTPATIENT)
Dept: BEHAVIORAL HEALTH | Facility: CLINIC | Age: 33
End: 2018-07-10
Attending: PSYCHIATRY & NEUROLOGY
Payer: COMMERCIAL

## 2018-07-10 PROCEDURE — H2012 BEHAV HLTH DAY TREAT, PER HR: HCPCS

## 2018-07-10 NOTE — PROGRESS NOTES
"Adult Mental Health Outpatient Group Therapy Progress Note             Client Initial Individualized Goals for Treatment:   \"to prepare for a healthy lifestyle\".        Diagnosis  295.70  (F25) Schizoaffective Disorder Bipolar Type      Treatment Goals:     1.  Personal Safety  Report any urges or thoughts to harm yourself to the Tx team.      2.  Self-Support Skills:  practice coping skills/strategies to help   3.  Group Therapy learn and practice 2 skills to manage the anxiety, depression and reduce negative thoughts.   4.  Community Resources: Find a support group near home, such as [facility]      Area of Treatment Focus:  Symptom Management, Personal Safety      Therapeutic Interventions/Treatment Strategies:  Support, Feedback, Safety Assessments, Structured Activity and Education      Response to Treatment Strategies:  Accepted Feedback, Listened, Attentive, Accepted Support and Alert      Name of Group:   Group Therapy, 2:00-2:50 PM. 4 attendees.      Progress Note:   Maddy actively participated in group therapy. She reported feeling very tired the past several days and correlated this with starting Topamax approximately one week ago.  She said the nurse told her to \"give it a month,\" and writer reiterated this advice, as Maddy said she has notices some symptom reduction benefits on the new medication.  Maddy also talked about swimming (exercise) and spending time with her boyfriend.  Maddy said her symptoms are \"a little better,\" was supportive of other group members, and denied any safety concerns.      Client demonstrated understanding of session content by participating in the group therapy discussion, and sharing ideas with others in the group for ways to manage anxiety.         Client will benefit from additional opportunities to practice and implement content from this session and receive feedback from the group.        Is this a Weekly Review of the Progress on the Treatment Plan?  No.    "

## 2018-07-10 NOTE — PROGRESS NOTES
"Adult Mental Health Outpatient Group Therapy Progress Note     Client Initial Individualized Goals for Treatment: \"to prepare for a healthy lifestyle\"      See Initial Treatment suggestions for the client during the time between Diagnostic Assessment and completion of the Master Individualized Treatment Plan.    Treatment Goals:  1.Client will notify staff when needing assistance to develop or implement a coping plan to manage suicidal or self injurious urges.  2. When in self support skills group client will learn and practice 1-2 coping skills to help mange stress,decrease procrastination and make better personal decisions providing an update of progress weekly.  3.  Group Therapy learn and practice 2 skills to manage the anxiety, depression and reduce negative thoughts.   4.  Community Resources: Find a support group near home, such as [facility]    Area of Treatment Focus:  Symptom Management    Therapeutic Interventions/Treatment Strategies:  Support, Feedback, Safety Assessments, Structured Activity and Education    Response to Treatment Strategies:  Accepted Feedback, Listened, Attentive, Accepted Support and Alert    Name of Group: Life Skills (3:00-3:50)    Group Attendance: 4 of 5    Description and Outcome:  Client presented with low mood with fair concentration and focus.Psychoeducation information presented included a discussion related to personal recovery and time management. Client reports that she feels that she is sleeping too much which could be related to her medication (she is already prescribed Haldol (7 mg and Topamax was added about a week ago to help her lose some weight per her report. Client reports that she is unable to identify the early warning signs of becoming unwell and she does not know anything which helps her to stay well. Client does not feel part of a social community and does not like where she is living.Thought process clear,goal directed and reality based.Goal #1 (no personal " safety concerns reported or observed). Goal #2 Client reports that she is using her skills to manage stress 100% of the time.  Client would benefit from additional opportunities to practice and implement content from this session in her every day life and mental health recovery.    Is this a Weekly Review of the Progress on the Treatment Plan?  Yes.      Are Treatment Plan Goals being addressed?  Yes, continue treatment goals      Are Treatment Plan Strategies to Address Goals Effective?  Yes, continue treatment strategies      Are there any current contracts in place?  No

## 2018-07-11 ENCOUNTER — OFFICE VISIT (OUTPATIENT)
Dept: ALLERGY | Facility: OTHER | Age: 33
End: 2018-07-11
Payer: COMMERCIAL

## 2018-07-11 ENCOUNTER — TELEPHONE (OUTPATIENT)
Dept: ALLERGY | Facility: OTHER | Age: 33
End: 2018-07-11

## 2018-07-11 VITALS
DIASTOLIC BLOOD PRESSURE: 80 MMHG | HEART RATE: 114 BPM | WEIGHT: 201 LBS | OXYGEN SATURATION: 100 % | TEMPERATURE: 97.4 F | SYSTOLIC BLOOD PRESSURE: 110 MMHG | BODY MASS INDEX: 33.29 KG/M2 | RESPIRATION RATE: 16 BRPM

## 2018-07-11 DIAGNOSIS — J30.89 ALLERGIC RHINITIS DUE TO MOLD: ICD-10-CM

## 2018-07-11 DIAGNOSIS — J30.1 CHRONIC SEASONAL ALLERGIC RHINITIS DUE TO POLLEN: Primary | ICD-10-CM

## 2018-07-11 DIAGNOSIS — J30.81 ALLERGIC RHINITIS DUE TO ANIMAL DANDER: ICD-10-CM

## 2018-07-11 DIAGNOSIS — J30.89 ALLERGIC RHINITIS DUE TO DUST MITE: ICD-10-CM

## 2018-07-11 PROCEDURE — 95180 RAPID DESENSITIZATION: CPT | Performed by: ALLERGY & IMMUNOLOGY

## 2018-07-11 NOTE — MR AVS SNAPSHOT
After Visit Summary   7/11/2018    Maddy Ortiz    MRN: [id number]           Patient Information     Date Of Birth          1985        Visit Information        Provider Department      7/11/2018 1:00 PM Magdy Blevins DO [facility]        Today's Diagnoses     Chronic seasonal allergic rhinitis due to pollen    -  1    Allergic rhinitis due to mold        Allergic rhinitis due to dust mite        Allergic rhinitis due to animal dander          Care Instructions    Allergy Staff Appt Hours Shot Hours Locations    Physician     Magdy Blevins DO       Support Staff     MARY ANNE Meneses, [facility]  Monday:                      [place] 8-7     Tuesday:         [place] 8-5     Wednesday:        [place]: 7-5     Friday:        [place] 7-5   [place]        Monday: 9-5:50        Wednesday: 2-5:50        Friday: 7-12:50     [place]        Tuesday: 7-10:50        Thursday: 1:30-6:30     [place] Monday: 7:10-4:50        Tuesday: 12:30-6:30        Thursday: 7-11:50 [facility]  [address] [place] [address]  Appt Line: [phone]  Allergy RN (Monday):  [phone]    [facility]  [address] [place] [address]  Appt Line: [phone]  Allergy RN (Tues & Wed):  [phone]    [facility]  [address] [place] [address]  Appt Line: [phone]  Allergy RN (Friday):  [phone]       Important Scheduling Information  Aspirin Desensitization: Appt will last 2 clinic days. Please call the Allergy RN line for your clinic to schedule. Discontinue antihistamines 7 days prior to the appointment.     Food Challenges: Appt will last 3-4 hours. Please call the Allergy RN line for your clinic to schedule. Discontinue antihistamines 7 days prior to the appointment.     Penicillin Testing: Appt will last 2-3 hours. Please call the Allergy RN line for your clinic to schedule. Discontinue antihistamines 7 days prior to the  appointment.     Skin Testing: Appt will about 40 minutes. Call the appointment line for your clinic to schedule. Discontinue antihistamines 7 days prior to the appointment.     Venom Testing: Appt will last 2-3 hours. Please call the Allergy RN line for your clinic to schedule. Discontinue antihistamines 7 days prior to the appointment.     Thank you for trusting us with your Allergy, Asthma, and Immunology care. Please feel free to contact us with any questions or concerns you may have.                Follow-ups after your visit        Your next 10 appointments already scheduled     Jul 12, 2018  1:00 PM CDT   Return Visit with ADULT  DAY 4C   [facility] ([facility])    [address] [place] [address]   [phone]            Jul 16, 2018  1:00 PM CDT   Return Visit with ADULT  DAY 4C [facility] ([facility])    [address] [place] [address]   [phone]            Jul 17, 2018  1:00 PM CDT   Return Visit with ADULT  DAY 4C [facility] ([facility])    [address] [place] [address]   [phone]            Jul 18, 2018  1:00 PM CDT   Nurse Only with ALLERGY RN - [facility] ([facility])    [address] [facility] [address]   [phone]            Jul 19, 2018  1:00 PM CDT   Return Visit with ADULT  DAY 4C [facility] ([facility])    [address] [place] [address]   [phone]            Jul 23, 2018  1:00 PM CDT   Return Visit with ADULT  DAY 4C [facility] ([facility])    [facility] [address] [address]   [phone]            Jul 24, 2018   1:00 PM CDT   Return Visit with ADULT MH DAY 4C   [facility] ([facility])    [address] [address]   [phone]            Jul 26, 2018  1:00 PM CDT   Return Visit with ADULT MH DAY 4C   [facility] ([facility])    [address] [address]   [phone]            Jul 30, 2018  1:00 PM CDT   Return Visit with ADULT MH DAY 4C   [facility] ([facility])    [address] [address]   [phone]            Jul 31, 2018  1:00 PM CDT   Return Visit with ADULT MH DAY 4C   [facility] ([facility])    [address] [address]   [phone]              Who to contact     If you have questions or need follow up information about today's clinic visit or your schedule please contact [facility] directly at [phone].  Normal or non-critical lab and imaging results will be communicated to you by MyChart, letter or phone within 4 business days after the clinic has received the results. If you do not hear from us within 7 days, please contact the clinic through MyChart or phone. If you have a critical or abnormal lab result, we will notify you by phone as soon as possible.  Submit refill requests through [employer] or call your pharmacy and they will forward the refill request to us. Please allow 3 business days for your refill to be completed.          Additional Information About Your Visit        Care EveryWhere ID     This is your Care EveryWhere ID. This could be used by other organizations to access your [place] medical records  [id number]        Your Vitals Were     Pulse Temperature Respirations Pulse Oximetry BMI (Body Mass Index)       114 97.4  F  (36.3  C) (Oral) 16 100% 33.29 kg/m2        Blood Pressure from Last 3 Encounters:   07/11/18 110/80   07/02/18 128/83   06/27/18 104/70    Weight from Last 3 Encounters:   07/11/18 91.2 kg (201 lb)   07/02/18 90 kg (198 lb 8 oz)   06/27/18 89.1 kg (196 lb 8 oz)              We Performed the Following     RAPID DESENSITIZATION        Primary Care Provider Office Phone # Fax #    Shirley Andrade [facility] [phone] [address] [facility] [address]        Equal Access to Services     BRIGID MCKEON : Hadii linda robbinso Soradha, waaxda luqadaha, qaybta kaalmada adeegyada, lincoln maciel . So [facility] [phone].    ATENCIÓN: Si habla español, tiene a titus disposición servicios gratuitos de asistencia lingüística. Llame al [phone].    We comply with applicable federal civil rights laws and [place] laws. We do not discriminate on the basis of race, color, national origin, age, disability, sex, sexual orientation, or gender identity.            Thank you!     Thank you for choosing [facility]  for your care. Our goal is always to provide you with excellent care. Hearing back from our patients is one way we can continue to improve our services. Please take a few minutes to complete the written survey that you may receive in the mail after your visit with us. Thank you!             Your Updated Medication List - Protect others around you: Learn how to safely use, store and throw away your medicines at www.disposemymeds.org.          This list is accurate as of 7/11/18  3:14 PM.  Always use your most recent med list.                   Brand Name Dispense Instructions for use Diagnosis    * ALLERGEN IMMUNOTHERAPY PRESCRIPTION     5 mL    Cat Hair, Standardized 10,000 BAU/mL, ALK  3.0 ml Dog Hair Dander, A. P.  1:100 w/v, HS  1.0 ml Dust Mites F 30,000AU/mL, HS  0.5 ml Dust Mites P. 30,000 AU/mL, HS  0.5 ml  Diluent: HSA qs to 5ml    Allergic  rhinitis due to animal dander, Allergic rhinitis due to dust mite       * ALLERGEN IMMUNOTHERAPY PRESCRIPTION     5 mL    Alternaria Tenuis 1:10 w/v, HS  0.5 ml Epicoccum Nigrum 1:10 w/v, HS 0.5 ml Hormodendrum Cladosporioides 1:10 w/v, HS 0.5 ml Diluent: HSA qs to 5ml    Allergic rhinitis due to mold       * ALLERGEN IMMUNOTHERAPY PRESCRIPTION     5 mL    Estuardo, White  1:20 w/v, HS  0.5 ml Birch Mix PRW 1:20 w/v, HS  0.5 ml Elm, American 1:20 w/v, HS  0.5 ml Hackberry 1:20 w/v, HS 0.5 ml Hickory, Shagbark 1:20 w/v, HS  0.5 ml [place] Mix RW 1:20 w/v, HS 0.5 ml Oak Mix RVW 1:20 w/v, HS 0.5 ml [place] Natl Pk Tree, Black 1:20 w/v, HS 0.5 ml Beaver, Black 1:20 w/v, HS 0.5 ml Diluent: HSA qs to 5ml    Chronic seasonal allergic rhinitis due to pollen       * ALLERGEN IMMUNOTHERAPY PRESCRIPTION     5 mL    Kochia 1:20 w/v, HS 1.0 ml Nettle 1:20 w/v, HS 1.0 ml Plantain, English 1:20 w/v, HS 1.0 ml Ragweed Mixed 1:20 w/v ALK  0.6 ml Russian Thistle 1:20 w/v, HS 1.0 ml Diluent: HSA qs to 5ml    Chronic seasonal allergic rhinitis due to pollen       cetirizine 10 MG tablet    zyrTEC    60 tablet    Take 1 tablet (10 mg) by mouth 2 times daily    Chronic seasonal allergic rhinitis due to pollen       EPINEPHrine 0.3 MG/0.3ML injection 2-pack    EPIPEN 2-ODETTE    0.6 mL    Inject 0.3 mLs (0.3 mg) into the muscle once as needed for anaphylaxis    Need for desensitization to allergens       * haloperidol 0.5 MG tablet    HALDOL     Take 1 mg by mouth 2 times daily 3mg in the morning;  4 mg at night        * haloperidol 1 MG tablet    HALDOL     Take 7 mg by mouth daily        hydrOXYzine 25 MG tablet    ATARAX          levonorgestrel 20 MCG/24HR IUD    MIRENA     1 each (20 mcg) by Intrauterine route continuous    Encounter for insertion of mirena IUD       melatonin 3 MG tablet     30 tablet    Take 1 tablet (3 mg) by mouth At Bedtime    Insomnia, unspecified type       ranitidine 150 MG tablet    ZANTAC    60 tablet    Take 1 tablet (150  mg) by mouth 2 times daily    Gastroesophageal reflux disease without esophagitis       topiramate 25 MG tablet    TOPAMAX    90 tablet    25 mg at bedtime for 1 week, 50 mg at bedtime for 1 week and 75 mg daily at bedtime thereafter    Morbid obesity (H)       traZODone 50 MG tablet    DESYREL    30 tablet    Take 1 tablet (50 mg) by mouth At Bedtime    Insomnia, unspecified type       VENTOLIN  (90 Base) MCG/ACT Inhaler   Generic drug:  albuterol           * Notice:  This list has 6 medication(s) that are the same as other medications prescribed for you. Read the directions carefully, and ask your doctor or other care provider to review them with you.

## 2018-07-11 NOTE — PATIENT INSTRUCTIONS
Allergy Staff Appt Hours Shot Hours Locations    Physician     Magdy Blevins, DO       Support Staff     Nessa SIERRA RN      Neeru SIERRA, [facility]  Monday:                      [place] 8-7     Tuesday:         [place] 8-5     Wednesday:        [place]: 7-5     Friday:        [place] 7-5   [place]        Monday: 9-5:50        Wednesday: 2-5:50        Friday: 7-12:50     [place]        Tuesday: 7-10:50        Thursday: 1:30-6:30     [place] Monday: 7:10-4:50        Tuesday: 12:30-6:30        Thursday: 7-11:50 [facility]  [address] [place] [address]  Appt Line: [phone]  Allergy RN (Monday):  [phone]    [facility]  [address] [place] [address]  Appt Line: [phone]  Allergy RN (Tues & Wed):  [phone]    [facility]  [address] [place] [address]  Appt Line: [phone]  Allergy RN (Friday):  [phone]       Important Scheduling Information  Aspirin Desensitization: Appt will last 2 clinic days. Please call the Allergy RN line for your clinic to schedule. Discontinue antihistamines 7 days prior to the appointment.     Food Challenges: Appt will last 3-4 hours. Please call the Allergy RN line for your clinic to schedule. Discontinue antihistamines 7 days prior to the appointment.     Penicillin Testing: Appt will last 2-3 hours. Please call the Allergy RN line for your clinic to schedule. Discontinue antihistamines 7 days prior to the appointment.     Skin Testing: Appt will about 40 minutes. Call the appointment line for your clinic to schedule. Discontinue antihistamines 7 days prior to the appointment.     Venom Testing: Appt will last 2-3 hours. Please call the Allergy RN line for your clinic to schedule. Discontinue antihistamines 7 days prior to the appointment.     Thank you for trusting us with your Allergy, Asthma, and Immunology care. Please feel free to contact us with any questions or concerns you may have.

## 2018-07-11 NOTE — PROGRESS NOTES
Maddy Ortiz is a 32 year old  female with previous medical history significant for allergic rhinitis who returns for a follow up visit. Maddy Ortiz is being seen today for asthma and seasonal allergies.       Patient presents today for cluster immunotherapy. The patient is currently in a good state of health. No recent fevers, chills, cough, wheezing, shortness of breath, skin rash, angioedema, nausea, vomiting or diarrhea.The risks and benefits were discussed and the patient/patient's family wishes to proceed. The consent was signed.      Past Medical History:   Diagnosis Date     Bipolar 1 disorder (H) 12/6/2012     Depressive disorder      Paranoid type delusional disorder (H) 11/14/2012     Family History   Problem Relation Age of Onset     Adopted: Yes     Unknown/Adopted Mother      Unknown/Adopted Father      Unknown/Adopted Other      Past Surgical History:   Procedure Laterality Date     TONSILLECTOMY & ADENOIDECTOMY      as a child       REVIEW OF SYSTEMS:  General: negative for weight gain. negative for weight loss. negative for changes in sleep.   Ears: negative for fullness. negative for hearing loss. negative for dizziness.   Nose: negative for snoring.negative for changes in smell. negative for drainage.   Throat: negative for hoarseness. negative for sore throat. negative for trouble swallowing.   Lungs: negative for shortness of breath.negative for wheezing. negative for sputum production.   Cardiovascular: negative for chest pain. negative for swelling of ankles. negative for fast or irregular heartbeat.   Gastrointestinal: negative for nausea. negative for heartburn. negative for acid reflux.   Musculoskeletal: negative for joint pain. negative for joint stiffness. negative for joint swelling.   Neurologic: negative for seizures. negative for fainting. negative for weakness.   Psychiatric: negative for changes in mood. negative for anxiety.   Endocrine: negative for cold intolerance.  negative for heat intolerance. negative for tremors.   Hematologic: negative for easy bruising. negative for easy bleeding.  Integumentary: negative for rash. negative for scaling. negative for nail changes.       Current Outpatient Prescriptions:      cetirizine (ZYRTEC) 10 MG tablet, Take 1 tablet (10 mg) by mouth 2 times daily, Disp: 60 tablet, Rfl: 11     EPINEPHrine (EPIPEN 2-ODETTE) 0.3 MG/0.3ML injection 2-pack, Inject 0.3 mLs (0.3 mg) into the muscle once as needed for anaphylaxis, Disp: 0.6 mL, Rfl: 3     haloperidol (HALDOL) 0.5 MG tablet, Take 1 mg by mouth 2 times daily 3mg in the morning;  4 mg at night, Disp: , Rfl:      haloperidol (HALDOL) 1 MG tablet, Take 7 mg by mouth daily , Disp: , Rfl: 2     hydrOXYzine (ATARAX) 25 MG tablet, , Disp: , Rfl: 2     levonorgestrel (MIRENA) 20 MCG/24HR IUD, 1 each (20 mcg) by Intrauterine route continuous, Disp: , Rfl:      melatonin 3 MG tablet, Take 1 tablet (3 mg) by mouth At Bedtime, Disp: 30 tablet, Rfl: 0     ORDER FOR ALLERGEN IMMUNOTHERAPY, Cat Hair, Standardized 10,000 BAU/mL, ALK  3.0 ml Dog Hair Dander, A. P.  1:100 w/v, HS  1.0 ml Dust Mites F 30,000AU/mL, HS  0.5 ml Dust Mites P. 30,000 AU/mL, HS  0.5 ml  Diluent: HSA qs to 5ml, Disp: 5 mL, Rfl: PRN     ORDER FOR ALLERGEN IMMUNOTHERAPY, Alternaria Tenuis 1:10 w/v, HS  0.5 ml Epicoccum Nigrum 1:10 w/v, HS 0.5 ml Hormodendrum Cladosporioides 1:10 w/v, HS 0.5 ml Diluent: HSA qs to 5ml, Disp: 5 mL, Rfl: PRN     ORDER FOR ALLERGEN IMMUNOTHERAPY, Estuardo, White  1:20 w/v, HS  0.5 ml Birch Mix PRW 1:20 w/v, HS  0.5 ml Elm, American 1:20 w/v, HS  0.5 ml Hackberry 1:20 w/v, HS 0.5 ml Hickory, Shagbark 1:20 w/v, HS  0.5 ml [place] Mix RW 1:20 w/v, HS 0.5 ml Oak Mix RVW 1:20 w/v, HS 0.5 ml [place] Tree, Black 1:20 w/v, HS 0.5 ml [place], Black 1:20 w/v, HS 0.5 ml Diluent: HSA qs to 5ml, Disp: 5 mL, Rfl: PRN     ORDER FOR ALLERGEN IMMUNOTHERAPY, Kochia 1:20 w/v, HS 1.0 ml Nettle 1:20 w/v, HS 1.0 ml Plantain, English 1:20  w/v, HS 1.0 ml Ragweed Mixed 1:20 w/v ALK  0.6 ml Russian Thistle 1:20 w/v, HS 1.0 ml Diluent: HSA qs to 5ml, Disp: 5 mL, Rfl: PRN     ranitidine (ZANTAC) 150 MG tablet, Take 1 tablet (150 mg) by mouth 2 times daily, Disp: 60 tablet, Rfl: 1     topiramate (TOPAMAX) 25 MG tablet, 25 mg at bedtime for 1 week, 50 mg at bedtime for 1 week and 75 mg daily at bedtime thereafter, Disp: 90 tablet, Rfl: 5     traZODone (DESYREL) 50 MG tablet, Take 1 tablet (50 mg) by mouth At Bedtime, Disp: 30 tablet, Rfl: 3     VENTOLIN  (90 Base) MCG/ACT Inhaler, , Disp: , Rfl: 2  Immunization History   Administered Date(s) Administered     Influenza (IIV3) PF 12/06/2012     Influenza Vaccine IM 3yrs+ 4 Valent IIV4 11/21/2017     TDAP Vaccine (Adacel) 12/06/2012     Allergies   Allergen Reactions     Animal Dander      Nkda [No Known Drug Allergies]      Seasonal Allergies      Weeds and mold         EXAM:   Constitutional:  Appears well-developed and well-nourished. No distress.   HEENT:   Head: Normocephalic.   Mouth/Throat: No oropharyngeal exudate present.   No cobblestoning of posterior oropharynx.   Nasal tissue pink and normal appearing.  No rhinorrhea noted.    Cardiovascular: Normal rate, regular rhythm and normal heart sounds. Exam reveals no gallop and no friction rub.   No murmur heard.  Respiratory: Effort normal and breath sounds normal. No respiratory distress. No wheezes. No rales.   Musculoskeletal: Normal range of motion.   Neuro: Oriented to person, place, and time.  Skin: Skin is warm and dry. No rash noted.   Psychiatric: Normal mood and affect.     Nursing note and vitals reviewed.      WORKUP:   Cluster immunotherapy   The patient received red 0.3, red 0.40. Each dose was  by 30 minutes. Full report will be scanned into electronic medical record. The patient was in office for over 1.0 hours.    ASSESSMENT/PLAN:  Problem List Items Addressed This Visit        Respiratory    Seasonal allergic rhinitis  due to pollen - Primary     History of nasal and ocular symptoms for multiple years. Perennial with spring and fall worsening. Tried cetirizine and loratadine which were helpful. Currently used Allegra as needed. Allergy testing done in 2013 positive for dust mite, cat, dog, molds, trees, grasses and weeds. Tolerated cluster immunotherapy.       Skin testing  Positive for cat, dog, dust mites, weeds, trees and molds.       - Flonase 2 spray/nostril daily. Use spring and fall at the very least.   - Allegra or Zyrtec as needed and on allergy shot days.   - Continue allergen immunotherapy.          Relevant Orders    RAPID DESENSITIZATION    Allergic rhinitis due to mold    Relevant Orders    RAPID DESENSITIZATION    Allergic rhinitis due to animal dander    Relevant Orders    RAPID DESENSITIZATION    Allergic rhinitis due to dust mite    Relevant Orders    RAPID DESENSITIZATION          Chart documentation with Dragon Voice recognition Software. Although reviewed after completion, some words and grammatical errors may remain.    Magdy Blevins,    Allergy/Immunology  [facility] [place]

## 2018-07-11 NOTE — PROGRESS NOTES
"Adult Mental Health Outpatient Group Therapy Progress Note     Client Initial Individualized Goals for Treatment: \"to prepare for a healthy lifestyle\"      See Initial Treatment suggestions for the client during the time between Diagnostic Assessment and completion of the Master Individualized Treatment Plan.    Treatment Goals:  1.Client will notify staff when needing assistance to develop or implement a coping plan to manage suicidal or self injurious urges.  2. When in self support skills group client will learn and practice 1-2 coping skills to help mange stress,decrease procrastination and make better personal decisions providing an update of progress weekly.  3.  Group Therapy learn and practice 2 skills to manage the anxiety, depression and reduce negative thoughts.   4.  Community Resources: Find a support group near home, such as [facility]    Area of Treatment Focus:  Symptom Management    Therapeutic Interventions/Treatment Strategies:  Support, Feedback, Safety Assessments, Structured Activity and Education    Response to Treatment Strategies:  Accepted Feedback, Listened, Attentive, Accepted Support and Alert    Name of Group: Life Skills (3:00-3:50)    Group Attendance: 4 of 5    Description and Outcome:  Client presented with low mood with fair concentration and focus.Psychoeducation information presented included a discussion related to communication with a focus on conversation skills.Thought process clear,goal directed and reality based.Goal #1 (no personal safety concerns reported or observed). Goal #2 Not addressed   Client would benefit from additional opportunities to practice and implement content from this session in her every day life and mental health recovery.    Is this a Weekly Review of the Progress on the Treatment Plan?  Yes.      Are Treatment Plan Goals being addressed?  Yes, continue treatment goals      Are Treatment Plan Strategies to Address Goals Effective?  Yes, continue treatment " strategies      Are there any current contracts in place?  No

## 2018-07-11 NOTE — LETTER
7/11/2018         RE: Maddy Ortiz  [address]  [place] [address]        Dear Colleague,    Thank you for referring your patient, Maddy Ortiz, to the [facility]. Please see a copy of my visit note below.    Maddy Ortiz is a 32 year old  female with previous medical history significant for allergic rhinitis who returns for a follow up visit. Maddy Ortiz is being seen today for asthma and seasonal allergies.       Patient presents today for cluster immunotherapy. The patient is currently in a good state of health. No recent fevers, chills, cough, wheezing, shortness of breath, skin rash, angioedema, nausea, vomiting or diarrhea.The risks and benefits were discussed and the patient/patient's family wishes to proceed. The consent was signed.      Past Medical History:   Diagnosis Date     Bipolar 1 disorder (H) 12/6/2012     Depressive disorder      Paranoid type delusional disorder (H) 11/14/2012     Family History   Problem Relation Age of Onset     Adopted: Yes     Unknown/Adopted Mother      Unknown/Adopted Father      Unknown/Adopted Other      Past Surgical History:   Procedure Laterality Date     TONSILLECTOMY & ADENOIDECTOMY      as a child       REVIEW OF SYSTEMS:  General: negative for weight gain. negative for weight loss. negative for changes in sleep.   Ears: negative for fullness. negative for hearing loss. negative for dizziness.   Nose: negative for snoring.negative for changes in smell. negative for drainage.   Throat: negative for hoarseness. negative for sore throat. negative for trouble swallowing.   Lungs: negative for shortness of breath.negative for wheezing. negative for sputum production.   Cardiovascular: negative for chest pain. negative for swelling of ankles. negative for fast or irregular heartbeat.   Gastrointestinal: negative for nausea. negative for heartburn. negative for acid reflux.   Musculoskeletal: negative for joint pain.  negative for joint stiffness. negative for joint swelling.   Neurologic: negative for seizures. negative for fainting. negative for weakness.   Psychiatric: negative for changes in mood. negative for anxiety.   Endocrine: negative for cold intolerance. negative for heat intolerance. negative for tremors.   Hematologic: negative for easy bruising. negative for easy bleeding.  Integumentary: negative for rash. negative for scaling. negative for nail changes.       Current Outpatient Prescriptions:      cetirizine (ZYRTEC) 10 MG tablet, Take 1 tablet (10 mg) by mouth 2 times daily, Disp: 60 tablet, Rfl: 11     EPINEPHrine (EPIPEN 2-ODETTE) 0.3 MG/0.3ML injection 2-pack, Inject 0.3 mLs (0.3 mg) into the muscle once as needed for anaphylaxis, Disp: 0.6 mL, Rfl: 3     haloperidol (HALDOL) 0.5 MG tablet, Take 1 mg by mouth 2 times daily 3mg in the morning;  4 mg at night, Disp: , Rfl:      haloperidol (HALDOL) 1 MG tablet, Take 7 mg by mouth daily , Disp: , Rfl: 2     hydrOXYzine (ATARAX) 25 MG tablet, , Disp: , Rfl: 2     levonorgestrel (MIRENA) 20 MCG/24HR IUD, 1 each (20 mcg) by Intrauterine route continuous, Disp: , Rfl:      melatonin 3 MG tablet, Take 1 tablet (3 mg) by mouth At Bedtime, Disp: 30 tablet, Rfl: 0     ORDER FOR ALLERGEN IMMUNOTHERAPY, Cat Hair, Standardized 10,000 BAU/mL, ALK  3.0 ml Dog Hair Dander, A. P.  1:100 w/v, HS  1.0 ml Dust Mites F 30,000AU/mL, HS  0.5 ml Dust Mites P. 30,000 AU/mL, HS  0.5 ml  Diluent: HSA qs to 5ml, Disp: 5 mL, Rfl: PRN     ORDER FOR ALLERGEN IMMUNOTHERAPY, Alternaria Tenuis 1:10 w/v, HS  0.5 ml Epicoccum Nigrum 1:10 w/v, HS 0.5 ml Hormodendrum Cladosporioides 1:10 w/v, HS 0.5 ml Diluent: HSA qs to 5ml, Disp: 5 mL, Rfl: PRN     ORDER FOR ALLERGEN IMMUNOTHERAPY, Estuardo, White  1:20 w/v, HS  0.5 ml Birch Mix PRW 1:20 w/v, HS  0.5 ml Elm, American 1:20 w/v, HS  0.5 ml Hackberry 1:20 w/v, HS 0.5 ml Hickory, Shagbark 1:20 w/v, HS  0.5 ml Stella Mix RW 1:20 w/v, HS 0.5 ml Oak Mix RVW  1:20 w/v, HS 0.5 ml Dorr Tree, Black 1:20 w/v, HS 0.5 ml Cropsey, Black 1:20 w/v, HS 0.5 ml Diluent: HSA qs to 5ml, Disp: 5 mL, Rfl: PRN     ORDER FOR ALLERGEN IMMUNOTHERAPY, Kochia 1:20 w/v, HS 1.0 ml Nettle 1:20 w/v, HS 1.0 ml Plantain, English 1:20 w/v, HS 1.0 ml Ragweed Mixed 1:20 w/v ALK  0.6 ml Russian Thistle 1:20 w/v, HS 1.0 ml Diluent: HSA qs to 5ml, Disp: 5 mL, Rfl: PRN     ranitidine (ZANTAC) 150 MG tablet, Take 1 tablet (150 mg) by mouth 2 times daily, Disp: 60 tablet, Rfl: 1     topiramate (TOPAMAX) 25 MG tablet, 25 mg at bedtime for 1 week, 50 mg at bedtime for 1 week and 75 mg daily at bedtime thereafter, Disp: 90 tablet, Rfl: 5     traZODone (DESYREL) 50 MG tablet, Take 1 tablet (50 mg) by mouth At Bedtime, Disp: 30 tablet, Rfl: 3     VENTOLIN  (90 Base) MCG/ACT Inhaler, , Disp: , Rfl: 2  Immunization History   Administered Date(s) Administered     Influenza (IIV3) PF 12/06/2012     Influenza Vaccine IM 3yrs+ 4 Valent IIV4 11/21/2017     TDAP Vaccine (Adacel) 12/06/2012     Allergies   Allergen Reactions     Animal Dander      Nkda [No Known Drug Allergies]      Seasonal Allergies      Weeds and mold         EXAM:   Constitutional:  Appears well-developed and well-nourished. No distress.   HEENT:   Head: Normocephalic.   Mouth/Throat: No oropharyngeal exudate present.   No cobblestoning of posterior oropharynx.   Nasal tissue pink and normal appearing.  No rhinorrhea noted.    Cardiovascular: Normal rate, regular rhythm and normal heart sounds. Exam reveals no gallop and no friction rub.   No murmur heard.  Respiratory: Effort normal and breath sounds normal. No respiratory distress. No wheezes. No rales.   Musculoskeletal: Normal range of motion.   Neuro: Oriented to person, place, and time.  Skin: Skin is warm and dry. No rash noted.   Psychiatric: Normal mood and affect.     Nursing note and vitals reviewed.      WORKUP:   Cluster immunotherapy   The patient received red 0.3, red 0.40.  Each dose was  by 30 minutes. Full report will be scanned into electronic medical record. The patient was in office for over 1.0 hours.    ASSESSMENT/PLAN:  Problem List Items Addressed This Visit        Respiratory    Seasonal allergic rhinitis due to pollen - Primary     History of nasal and ocular symptoms for multiple years. Perennial with spring and fall worsening. Tried cetirizine and loratadine which were helpful. Currently used Allegra as needed. Allergy testing done in 2013 positive for dust mite, cat, dog, molds, trees, grasses and weeds. Tolerated cluster immunotherapy.       Skin testing  Positive for cat, dog, dust mites, weeds, trees and molds.       - Flonase 2 spray/nostril daily. Use spring and fall at the very least.   - Allegra or Zyrtec as needed and on allergy shot days.   - Continue allergen immunotherapy.          Relevant Orders    RAPID DESENSITIZATION    Allergic rhinitis due to mold    Relevant Orders    RAPID DESENSITIZATION    Allergic rhinitis due to animal dander    Relevant Orders    RAPID DESENSITIZATION    Allergic rhinitis due to dust mite    Relevant Orders    RAPID DESENSITIZATION          Chart documentation with Dragon Voice recognition Software. Although reviewed after completion, some words and grammatical errors may remain.    Magdy Blevins,    Allergy/Immunology  [facility], [place]      Again, thank you for allowing me to participate in the care of your patient.        Sincerely,        Magdy Blevins, DO

## 2018-07-11 NOTE — TELEPHONE ENCOUNTER
Patient's last cluster immunotherapy appointment was today.  Patient will be starting traditional immunotherapy at [facility] location next week.  Serums sent by  to [place] [facility] today (tracking number: [id number]).  Please close encounter once received, thank you.    Nessa Sanchez RN

## 2018-07-12 ENCOUNTER — HOSPITAL ENCOUNTER (OUTPATIENT)
Dept: BEHAVIORAL HEALTH | Facility: CLINIC | Age: 33
End: 2018-07-12
Attending: PSYCHIATRY & NEUROLOGY
Payer: COMMERCIAL

## 2018-07-12 PROCEDURE — H2012 BEHAV HLTH DAY TREAT, PER HR: HCPCS

## 2018-07-12 NOTE — PROGRESS NOTES
"Adult Mental Health Outpatient Group Therapy Progress Note     Client Initial Individualized Goals for Treatment: \"to prepare for a healthy lifestyle\"       See Initial Treatment suggestions for the client during the time between Diagnostic Assessment and completion of the Master Individualized Treatment Plan.     Treatment Goals:  1.Client will notify staff when needing assistance to develop or implement a coping plan to manage suicidal or self injurious urges.  2. When in self support skills group client will learn and practice 1-2 coping skills to help mange stress,decrease procrastination and make better personal decisions providing an update of progress weekly.  3.  Group Therapy learn and practice 2 skills to manage the anxiety, depression and reduce negative thoughts.   4.  Community Resources: Find a support group near home, such as [facility]      Area of Treatment Focus:  Symptom Management, Personal Safety and Develop / Improve Independent Living Skills    Therapeutic Interventions/Treatment Strategies:  Support, Feedback, Safety Assessments, Structured Activity, Education and sensory modalities and weighted materials    Response to Treatment Strategies:  Gave Feedback, Listened, Accepted Support and Alert    Name of Group:  Mental Health Management [id number]     Group Participants: 4 of 5.      Description and Outcome:  Maddy participated in a psycho-educational group focused on learning about our sensory system and how sensory input can be helpful in managing symptoms and stress.  Maddy was generally quiet in the group but was observed to be listening.  She did share a few times when asked directly for her input on helpful sensory input (music).  Maddy was supportive of others.  Thought process was clear and goal directed.  Energy level seemed low.  Mood was even. Client would benefit from additional opportunities to practice and implement content from this session.    Is this a Weekly Review of the Progress " on the Treatment Plan?  No

## 2018-07-12 NOTE — PROGRESS NOTES
"Adult Mental Health Outpatient Group Therapy Progress Note         Client Initial Individualized Goals for Treatment: \"to prepare for a healthy lifestyle\"       See Initial Treatment suggestions for the client during the time between Diagnostic Assessment and completion of the Master Individualized Treatment Plan.     Treatment Goals:  1.Client will notify staff when needing assistance to develop or implement a coping plan to manage suicidal or self injurious urges.  2. When in self support skills group client will learn and practice 1-2 coping skills to help mange stress,decrease procrastination and make better personal decisions providing an update of progress weekly.  3.  Group Therapy learn and practice 2 skills to manage the anxiety, depression and reduce negative thoughts.   4.  Community Resources: Find a support group near home, such as [facility]     Area of Treatment Focus:  Symptom Management     Therapeutic Interventions/Treatment Strategies:  Support, Feedback, Safety Assessments, Structured Activity and Education     Response to Treatment Strategies:  Accepted Feedback, Listened, Attentive, Accepted Support and Alert     Name of Group: Group Therapy (2:00-2:50)     Group Attendance: 5 of 5     Description and Outcome:   Maddy reported being safe today.  She participated in a Gratitude Check-in with others. She reported that she is doing her self-cares, and volunteering at a [employer].  She reported that she enjoyed the work at the [employer] and helped with cleaning.  She stated that she liked the smell of the  and was happy to be at the store.  She stated that she went to breakfast with her boyfriend at [employer] and ate an omelet with hash browns.  She stated that he ordered pancakes and talked to the group about the restaurant.  She reported feeling fatigued and felt that she had no energy to go swimming.  She noticed that swimming was enjoyable, but that her medications were making her tired, " lately.  She reported that her family and friends were supportive for her.                          The group participated in mindfulness Yoga stretches for 10 minutes.     Is this a Weekly Review of the Progress on the Treatment Plan?  Yes.       Are Treatment Plan Goals being addressed?  Yes, continue treatment goals        Are Treatment Plan Strategies to Address Goals Effective?  Yes, continue treatment strategies        Are there any current contracts in place?  No

## 2018-07-13 NOTE — PROGRESS NOTES
"Adult Mental Health Outpatient Group Therapy Progress Note     Client Initial Individualized Goals for Treatment: \"to prepare for a healthy lifestyle\"      See Initial Treatment suggestions for the client during the time between Diagnostic Assessment and completion of the Master Individualized Treatment Plan.    Treatment Goals:  1.Client will notify staff when needing assistance to develop or implement a coping plan to manage suicidal or self injurious urges.  2. When in self support skills group client will learn and practice 1-2 coping skills to help mange stress,decrease procrastination and make better personal decisions providing an update of progress weekly.  3.  Group Therapy learn and practice 2 skills to manage the anxiety, depression and reduce negative thoughts.   4.  Community Resources: Find a support group near home, such as [facility]    Area of Treatment Focus:  Symptom Management    Therapeutic Interventions/Treatment Strategies:  Support, Feedback, Safety Assessments, Structured Activity and Education    Response to Treatment Strategies:  Accepted Feedback, Listened, Attentive, Accepted Support and Alert    Name of Group: Life Skills (3:00-3:50)    Group Attendance: 5 of 5    Description and Outcome:  Client presented with low mood with fair concentration and focus.Psychoeducation information presented included a discussion related to communication with a focus on conversation skills.Thought process clear,goal directed and reality based.Goal #1 (no personal safety concerns reported or observed). Goal #2 Client reports that she is trying to decrease procrastination by following through with her appointments and goals.  Client would benefit from additional opportunities to practice and implement content from this session in her every day life and mental health recovery.    Is this a Weekly Review of the Progress on the Treatment Plan?  Yes.      Are Treatment Plan Goals being addressed?  Yes, continue " treatment goals      Are Treatment Plan Strategies to Address Goals Effective?  Yes, continue treatment strategies      Are there any current contracts in place?  No

## 2018-07-16 ENCOUNTER — HOSPITAL ENCOUNTER (OUTPATIENT)
Dept: BEHAVIORAL HEALTH | Facility: CLINIC | Age: 33
End: 2018-07-16
Attending: PSYCHIATRY & NEUROLOGY
Payer: COMMERCIAL

## 2018-07-16 PROCEDURE — H2012 BEHAV HLTH DAY TREAT, PER HR: HCPCS

## 2018-07-16 NOTE — PROGRESS NOTES
"Adult Mental Health Outpatient Group Therapy Progress Note              Client Initial Individualized Goals for Treatment:   \"to prepare for a healthy lifestyle\".        Diagnosis  295.70  (F25) Schizoaffective Disorder Bipolar Type      Treatment Goals:      1.  Personal Safety  Report any urges or thoughts to harm yourself to the Tx team.      2.  Self-Support Skills:  practice coping skills/strategies to help   3.  Group Therapy learn and practice 2 skills to manage the anxiety, depression and reduce negative thoughts.   4.  Community Resources: Find a support group near home, such as [facility]      Area of Treatment Focus:  Symptom Management, Personal Safety      Therapeutic Interventions/Treatment Strategies:  Support, Feedback, Safety Assessments, Structured Activity and Education      Response to Treatment Strategies:  Accepted Feedback, Listened, Attentive, Accepted Support and Alert      Name of Group:   Group Therapy, 2:00-2:50 PM. 5 attendees.      Progress Note:   Maddy actively participated in group therapy. She reported she is thinking about breaking up with her boyfriend.  She was asked about the reasons for this and replied: \"I think it is bad for my niece that I am with him.\"  We explored this reason in detail, and Maddy was able to explain it to the group, justifying it in a round-about way, but without any delusional thinking.  She also talked about feeling depressed, describing symptoms including fatigue and anhedonia.  She is scheduled to see her psychiatry provider in late August, and was encouraged to see if she can get in sooner.  Maddy said she will try to get an earlier appointment.  Maddy said her symptoms are \"a little better,\" was supportive of other group members, and denied any safety concerns.      Client demonstrated understanding of session content by participating in the group therapy discussion, and sharing ideas with others in the group for ways to manage anxiety.         Client " will benefit from additional opportunities to practice and implement content from this session and receive feedback from the group.        Is this a Weekly Review of the Progress on the Treatment Plan?  No.

## 2018-07-17 NOTE — PROGRESS NOTES
"Adult Mental Health Outpatient Group Therapy Progress Note     Client Initial Individualized Goals for Treatment: \"to prepare for a healthy lifestyle\"      See Initial Treatment suggestions for the client during the time between Diagnostic Assessment and completion of the Master Individualized Treatment Plan.    Treatment Goals:  1.Client will notify staff when needing assistance to develop or implement a coping plan to manage suicidal or self injurious urges.  2. When in self support skills group client will learn and practice 1-2 coping skills to help mange stress,decrease procrastination and make better personal decisions providing an update of progress weekly.  3.  Group Therapy learn and practice 2 skills to manage the anxiety, depression and reduce negative thoughts.   4.  Community Resources: Find a support group near home, such as [facility]    Area of Treatment Focus:  Symptom Management    Therapeutic Interventions/Treatment Strategies:  Support, Feedback, Safety Assessments, Structured Activity and Education    Response to Treatment Strategies:  Accepted Feedback, Listened, Attentive, Accepted Support and Alert    Name of Group: Life Skills (3:00-3:50)    Group Attendance: 5 of 6    Description and Outcome:  Client presented with even mood with good concentration and focus.Psychoeducation information presented included a discussion related to stress management and coping skills.Thought process clear,goal directed and reality based.Goal #1 (no personal safety concerns reported or observed). Goal #2 Client reports that she is trying to decrease procrastination by following through with her appointments and goals.  Client would benefit from additional opportunities to practice and implement content from this session in her every day life and mental health recovery.    Is this a Weekly Review of the Progress on the Treatment Plan?  Yes.      Are Treatment Plan Goals being addressed?  Yes, continue treatment " goals      Are Treatment Plan Strategies to Address Goals Effective?  Yes, continue treatment strategies      Are there any current contracts in place?  No

## 2018-07-18 ENCOUNTER — ALLIED HEALTH/NURSE VISIT (OUTPATIENT)
Dept: ALLERGY | Facility: CLINIC | Age: 33
End: 2018-07-18
Payer: COMMERCIAL

## 2018-07-18 DIAGNOSIS — J30.9 ALLERGIC RHINITIS: Primary | ICD-10-CM

## 2018-07-18 PROCEDURE — 99207 ZZC DROP WITH A PROCEDURE: CPT

## 2018-07-18 PROCEDURE — 95117 IMMUNOTHERAPY INJECTIONS: CPT

## 2018-07-18 NOTE — MR AVS SNAPSHOT
After Visit Summary   7/18/2018    Maddy Ortiz    MRN: [id number]           Patient Information     Date Of Birth          1985        Visit Information        Provider Department      7/18/2018 1:00 PM ALLERGY [facility]        Today's Diagnoses     Allergic rhinitis    -  1       Follow-ups after your visit        Your next 10 appointments already scheduled     Jul 19, 2018  1:00 PM CDT   Return Visit with ADULT  DAY 4C   [facility] ([facility])    [address] [place] [address]   [phone]            Jul 23, 2018  1:00 PM CDT   Return Visit with ADULT  DAY 4C   [facility] ([facility])    [address] [place] [address]   [phone]            Jul 24, 2018  1:00 PM CDT   Return Visit with ADULT  DAY 4C   [facility] ([facility])    [address] [place] [address]   [phone]            Jul 26, 2018  1:00 PM CDT   Return Visit with ADULT  DAY 4C   [facility] ([facility])    [address] [place] [address]   [phone]            Jul 30, 2018  1:00 PM CDT   Return Visit with ADULT  DAY 4C   [facility] ([facility])    [address] [place] [address]   [phone]            Jul 31, 2018  1:00 PM CDT   Return Visit with ADULT  DAY 4C   [facility] ([facility])    [address] [place] [address]   [phone]            Aug 02, 2018  1:00 PM CDT   Return Visit with ADULT  DAY 4C   [facility] ([facility])    [place] [address] [address]   [phone]            Aug 06, 2018  1:00 PM CDT   Return Visit with ADULT MH DAY 4C   [facility] ([facility])    [place] [address] [address]   [phone]            Aug 07, 2018  1:00 PM CDT   Return Visit with ADULT MH DAY 4C   [facility] ([facility])    [place] [address] [address]   [phone]            Aug 09, 2018  1:00 PM CDT   Return Visit with ADULT MH DAY 4C   [facility] ([facility])    [place] [address] [address]   [phone]              Who to contact     If you have questions or need follow up information about today's clinic visit or your schedule please contact [facility] directly at [phone].  Normal or non-critical lab and imaging results will be communicated to you by MyChart, letter or phone within 4 business days after the clinic has received the results. If you do not hear from us within 7 days, please contact the clinic through MyChart or phone. If you have a critical or abnormal lab result, we will notify you by phone as soon as possible.  Submit refill requests through [employer] or call your pharmacy and they will forward the refill request to us. Please allow 3 business days for your refill to be completed.          Additional Information About Your Visit        Care EveryWhere ID     This is your Care EveryWhere ID. This could be used by other organizations to access your [facility] medical records  [id number]         Blood Pressure from Last 3 Encounters:   07/11/18 110/80   07/02/18 128/83   06/27/18 104/70    Weight from Last 3 Encounters:   07/11/18 91.2 kg (201 lb)   07/02/18 90 kg (198 lb 8 oz)   06/27/18 89.1 kg (196 lb 8 oz)              We Performed the  Following     Allergy Shot: Two or more injections        Primary Care Provider Office Phone # Fax #    Shirley Freeman, APRN [facility] [phone] [address] [facility] [address]        Equal Access to Services     ARMOND MCKEON : Hadii aad ku hadasho Soomaali, waaxda luqadaha, qaybta kaalmada adeegyada, waxay idiin hayaan adeeg kharash la'aan ah. So [facility] [phone].    ATENCIÓN: Si habla español, tiene a titus disposición servicios gratuitos de asistencia lingüística. Llame al [phone].    We comply with applicable federal civil rights laws and [place] laws. We do not discriminate on the basis of race, color, national origin, age, disability, sex, sexual orientation, or gender identity.            Thank you!     Thank you for choosing [facility]  for your care. Our goal is always to provide you with excellent care. Hearing back from our patients is one way we can continue to improve our services. Please take a few minutes to complete the written survey that you may receive in the mail after your visit with us. Thank you!             Your Updated Medication List - Protect others around you: Learn how to safely use, store and throw away your medicines at www.disposemymeds.org.          This list is accurate as of 7/18/18  1:58 PM.  Always use your most recent med list.                   Brand Name Dispense Instructions for use Diagnosis    * ALLERGEN IMMUNOTHERAPY PRESCRIPTION     5 mL    Cat Hair, Standardized 10,000 BAU/mL, ALK  3.0 ml Dog Hair Dander, A. P.  1:100 w/v, HS  1.0 ml Dust Mites F 30,000AU/mL, HS  0.5 ml Dust Mites P. 30,000 AU/mL, HS  0.5 ml  Diluent: HSA qs to 5ml    Allergic rhinitis due to animal dander, Allergic rhinitis due to dust mite       * ALLERGEN IMMUNOTHERAPY PRESCRIPTION     5 mL    Alternaria Tenuis 1:10 w/v, HS  0.5 ml Epicoccum Nigrum 1:10 w/v, HS 0.5 ml Hormodendrum Cladosporioides 1:10 w/v, HS 0.5 ml Diluent: HSA qs to 5ml    Allergic  rhinitis due to mold       * ALLERGEN IMMUNOTHERAPY PRESCRIPTION     5 mL    Estuardo, White  1:20 w/v, HS  0.5 ml Birch Mix PRW 1:20 w/v, HS  0.5 ml Elm, American 1:20 w/v, HS  0.5 ml Hackberry 1:20 w/v, HS 0.5 ml Hickory, Shagbark 1:20 w/v, HS  0.5 ml [place] Mix RW 1:20 w/v, HS 0.5 ml Oak Mix RVW 1:20 w/v, HS 0.5 ml [place] Tree, Black 1:20 w/v, HS 0.5 ml [place], Black 1:20 w/v, HS 0.5 ml Diluent: HSA qs to 5ml    Chronic seasonal allergic rhinitis due to pollen       * ALLERGEN IMMUNOTHERAPY PRESCRIPTION     5 mL    Kochia 1:20 w/v, HS 1.0 ml Nettle 1:20 w/v, HS 1.0 ml Plantain, English 1:20 w/v, HS 1.0 ml Ragweed Mixed 1:20 w/v ALK  0.6 ml Russian Thistle 1:20 w/v, HS 1.0 ml Diluent: HSA qs to 5ml    Chronic seasonal allergic rhinitis due to pollen       cetirizine 10 MG tablet    zyrTEC    60 tablet    Take 1 tablet (10 mg) by mouth 2 times daily    Chronic seasonal allergic rhinitis due to pollen       EPINEPHrine 0.3 MG/0.3ML injection 2-pack    EPIPEN 2-ODETTE    0.6 mL    Inject 0.3 mLs (0.3 mg) into the muscle once as needed for anaphylaxis    Need for desensitization to allergens       * haloperidol 0.5 MG tablet    HALDOL     Take 1 mg by mouth 2 times daily 3mg in the morning;  4 mg at night        * haloperidol 1 MG tablet    HALDOL     Take 7 mg by mouth daily        hydrOXYzine 25 MG tablet    ATARAX          levonorgestrel 20 MCG/24HR IUD    MIRENA     1 each (20 mcg) by Intrauterine route continuous    Encounter for insertion of mirena IUD       melatonin 3 MG tablet     30 tablet    Take 1 tablet (3 mg) by mouth At Bedtime    Insomnia, unspecified type       ranitidine 150 MG tablet    ZANTAC    60 tablet    Take 1 tablet (150 mg) by mouth 2 times daily    Gastroesophageal reflux disease without esophagitis       topiramate 25 MG tablet    TOPAMAX    90 tablet    25 mg at bedtime for 1 week, 50 mg at bedtime for 1 week and 75 mg daily at bedtime thereafter    Morbid obesity (H)       traZODone 50 MG  tablet    DESYREL    30 tablet    Take 1 tablet (50 mg) by mouth At Bedtime    Insomnia, unspecified type       VENTOLIN  (90 Base) MCG/ACT Inhaler   Generic drug:  albuterol           * Notice:  This list has 6 medication(s) that are the same as other medications prescribed for you. Read the directions carefully, and ask your doctor or other care provider to review them with you.

## 2018-07-23 ENCOUNTER — TELEPHONE (OUTPATIENT)
Dept: BEHAVIORAL HEALTH | Facility: CLINIC | Age: 33
End: 2018-07-23

## 2018-07-23 NOTE — TELEPHONE ENCOUNTER
Phone call to Maddy to check status, and she reported that she had a psychiatry appointment.    Jacek Spaulding., D,  L.P.

## 2018-07-24 ENCOUNTER — HOSPITAL ENCOUNTER (OUTPATIENT)
Dept: BEHAVIORAL HEALTH | Facility: CLINIC | Age: 33
End: 2018-07-24
Attending: PSYCHIATRY & NEUROLOGY
Payer: COMMERCIAL

## 2018-07-24 PROCEDURE — H2012 BEHAV HLTH DAY TREAT, PER HR: HCPCS

## 2018-07-24 NOTE — PROGRESS NOTES
"Adult Mental Health Outpatient Group Therapy Progress Note           Groups:  Group Therapy    2:00 - 2:50   Mental Health Management:   1:00 - 1:50    Client Initial Individualized Goals for Treatment: \"to prepare for a healthy lifestyle\"        See Initial Treatment suggestions for the client during the time between Diagnostic Assessment and completion of the Master Individualized Treatment Plan.      Treatment Goals:  1.Client will notify staff when needing assistance to develop or implement a coping plan to manage suicidal or self injurious urges.  2. When in self support skills group client will learn and practice 1-2 coping skills to help mange stress,decrease procrastination and make better personal decisions providing an update of progress weekly.  3.  Group Therapy learn and practice 2 skills to manage the anxiety, depression and reduce negative thoughts.   4.  Community Resources: Find a support group near home, such as [facility]      Area of Treatment Focus:  Symptom Management      Therapeutic Interventions/Treatment Strategies:  Support, Feedback, Safety Assessments, Structured Activity and Education      Response to Treatment Strategies:  Accepted Feedback, Listened, Attentive, Accepted Support and Alert            Group Attendance: 4 of 5      Description and Outcome:                         Maddy reported being safe today.  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 She stated that she may take a job soon, and work in the afternoons, but wants to continue to come here to get support and build her routine.  She reported that her symptoms have decreased and that her concentration is better. She stated that she has had arguments with her boyfriend, but they continue to plan to do things together. She stated that she plans to go biking with her boyfriend " and that he fixed her bike. She stated that she wants to lose weight and is trying to eat healthier foods.    Mental Health Management                        The group participated in a structured activity that involved listening to a guided meditation about deep breathing and mindfulness. The group read a worksheet about feelings and discussed different feelings, the importance of identifying feelings, and how they were using them in their communications.  The group discussed thought distortions and what type they recognized. discussed negative thoughts and psychosis symptoms and what sort of thoughts came from paranoia.      Is this a Weekly Review of the Progress on the Treatment Plan?  Yes.        Are Treatment Plan Goals being addressed?  Yes, continue treatment goals          Are Treatment Plan Strategies to Address Goals Effective?  Yes, continue treatment strategies          Are there any current contracts in place?  No

## 2018-07-25 NOTE — PROGRESS NOTES
"Adult Mental Health Outpatient Group Therapy Progress Note     Client Initial Individualized Goals for Treatment: \"to prepare for a healthy lifestyle\"      See Initial Treatment suggestions for the client during the time between Diagnostic Assessment and completion of the Master Individualized Treatment Plan.    Treatment Goals:  1.Client will notify staff when needing assistance to develop or implement a coping plan to manage suicidal or self injurious urges.  2. When in self support skills group client will learn and practice 1-2 coping skills to help mange stress,decrease procrastination and make better personal decisions providing an update of progress weekly.  3.  Group Therapy learn and practice 2 skills to manage the anxiety, depression and reduce negative thoughts.   4.  Community Resources: Find a support group near home, such as [facility]    Area of Treatment Focus:  Symptom Management    Therapeutic Interventions/Treatment Strategies:  Support, Feedback, Safety Assessments, Structured Activity and Education    Response to Treatment Strategies:  Accepted Feedback, Listened, Attentive, Accepted Support and Alert    Name of Group: Life Skills (3:00-3:50)    Group Attendance: 4 of 5    Description and Outcome:  Client presented with even mood with good concentration and focus.Psychoeducation information presented included a discussion related to creating a vision for health and wellness.Thought process clear,goal directed and reality based. Client acknowledged that she has been busy with job interviews. 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 This therapist informed client that a referral to [facility] ([facility]) as part of her discharge plan.  Goal #1 (no personal safety concerns reported or observed). Goal #2 Addressed per discussion.  Client would benefit from additional opportunities to practice and implement content from this session in her every day life " and mental health recovery.    Is this a Weekly Review of the Progress on the Treatment Plan?  Yes.      Are Treatment Plan Goals being addressed?  Yes, continue treatment goals      Are Treatment Plan Strategies to Address Goals Effective?  Yes, continue treatment strategies      Are there any current contracts in place?  No

## 2018-07-26 ENCOUNTER — HOSPITAL ENCOUNTER (OUTPATIENT)
Dept: BEHAVIORAL HEALTH | Facility: CLINIC | Age: 33
End: 2018-07-26
Attending: PSYCHIATRY & NEUROLOGY
Payer: COMMERCIAL

## 2018-07-26 NOTE — PROGRESS NOTES
"Adult Mental Health Outpatient Group Therapy Progress Note           Client Initial Individualized Goals for Treatment: \"to prepare for a healthy lifestyle\"        See Initial Treatment suggestions for the client during the time between Diagnostic Assessment and completion of the Master Individualized Treatment Plan.      Treatment Goals:  1.Client will notify staff when needing assistance to develop or implement a coping plan to manage suicidal or self injurious urges.  2. When in self support skills group client will learn and practice 1-2 coping skills to help mange stress,decrease procrastination and make better personal decisions providing an update of progress weekly.  3.  Group Therapy learn and practice 2 skills to manage the anxiety, depression and reduce negative thoughts.   4.  Community Resources: Find a support group near home, such as [facility]      Area of Treatment Focus:  Symptom Management      Therapeutic Interventions/Treatment Strategies:  Support, Feedback, Safety Assessments, Structured Activity and Education      Response to Treatment Strategies:  Accepted Feedback, Listened, Attentive, Accepted Support and Alert      Name of Group: Group Therapy (2:00-2:50)      Group Attendance: 5 of 5      Description and Outcome:                         Maddy reported being safe today. She reported that she feels comfortable in her home and practices mindfulness by laying in her bed and listening to Classical music.  She stated that her therapist and her mom are supportive people for her, and that she feels comfortable talking to them.  She was observed wearing her sunglasses during the session, touching the wall, touching the hand  dispenser, and laughing, when others were checking in with the group.  She apologized and said that she had funny thoughts.  She reported that she has been applying for jobs and that she may get 2 different job offers, and was encouraged to tell the group when she received " an offer and made a job schedule. She reported that she listens to music to lower her anxiety and that she holds the tension in her shoulders.  She offered suggestions to others about how to manage their anxiety levels and paranoia.  .    Client verbalized understanding of session content by sharing her thoughts and giving feedback.    Client would benefit from additional opportunities to practice and implement content from this session.    Is this a Weekly Review of the Progress on the Treatment Plan?  Yes.        Are Treatment Plan Goals being addressed?  Yes, continue treatment goals          Are Treatment Plan Strategies to Address Goals Effective?  Yes, continue treatment strategies          Are there any current contracts in place?  No

## 2018-07-27 ENCOUNTER — MEDICAL CORRESPONDENCE (OUTPATIENT)
Dept: HEALTH INFORMATION MANAGEMENT | Facility: CLINIC | Age: 33
End: 2018-07-27

## 2018-07-27 NOTE — PROGRESS NOTES
"Adult Mental Health Outpatient Group Therapy Progress Note     Client Initial Individualized Goals for Treatment: \"to prepare for a healthy lifestyle\"      See Initial Treatment suggestions for the client during the time between Diagnostic Assessment and completion of the Master Individualized Treatment Plan.    Treatment Goals:  1.Client will notify staff when needing assistance to develop or implement a coping plan to manage suicidal or self injurious urges.  2. When in self support skills group client will learn and practice 1-2 coping skills to help mange stress,decrease procrastination and make better personal decisions providing an update of progress weekly.  3.  Group Therapy learn and practice 2 skills to manage the anxiety, depression and reduce negative thoughts.   4.  Community Resources: Find a support group near home, such as [facility]    Area of Treatment Focus:  Symptom Management    Therapeutic Interventions/Treatment Strategies:  Support, Feedback, Safety Assessments, Structured Activity and Education    Response to Treatment Strategies:  Accepted Feedback, Listened, Attentive, Accepted Support and Alert    Name of Group: Life Skills (3:00-3:50)    Group Attendance: 5 of 5    Description and Outcome:  Client presented with low mood and energy level. Poor to fair focus and concentration.Psychoeducation information presented included a discussion related to how to manage with social anxiety and paranoia.  Thought process clear,goal directed and reality based. Goal #1 (no personal safety concerns reported or observed). Goal #2 Not addressed.  Client would benefit from additional opportunities to practice and implement content from this session in her every day life and mental health recovery.    Is this a Weekly Review of the Progress on the Treatment Plan?  Yes.      Are Treatment Plan Goals being addressed?  Yes, continue treatment goals      Are Treatment Plan Strategies to Address Goals Effective?  Yes, " continue treatment strategies      Are there any current contracts in place?  No

## 2018-07-30 ENCOUNTER — OFFICE VISIT (OUTPATIENT)
Dept: FAMILY MEDICINE | Facility: CLINIC | Age: 33
End: 2018-07-30
Payer: COMMERCIAL

## 2018-07-30 ENCOUNTER — HOSPITAL ENCOUNTER (OUTPATIENT)
Dept: BEHAVIORAL HEALTH | Facility: CLINIC | Age: 33
End: 2018-07-30
Attending: PSYCHIATRY & NEUROLOGY
Payer: COMMERCIAL

## 2018-07-30 VITALS
OXYGEN SATURATION: 95 % | BODY MASS INDEX: 33.82 KG/M2 | DIASTOLIC BLOOD PRESSURE: 80 MMHG | HEART RATE: 92 BPM | WEIGHT: 204.2 LBS | TEMPERATURE: 96.3 F | SYSTOLIC BLOOD PRESSURE: 102 MMHG

## 2018-07-30 DIAGNOSIS — H61.23 BILATERAL IMPACTED CERUMEN: Primary | ICD-10-CM

## 2018-07-30 PROCEDURE — H2012 BEHAV HLTH DAY TREAT, PER HR: HCPCS

## 2018-07-30 PROCEDURE — 69210 REMOVE IMPACTED EAR WAX UNI: CPT | Mod: 50 | Performed by: FAMILY MEDICINE

## 2018-07-30 NOTE — MR AVS SNAPSHOT
After Visit Summary   7/30/2018    Maddy Ortiz    MRN: [id number]           Patient Information     Date Of Birth          1985        Visit Information        Provider Department      7/30/2018 7:40 AM Jay Jay Hargrove MD [facility]        Today's Diagnoses     Bilateral impacted cerumen    -  1      Care Instructions    (H61.23) Bilateral impacted cerumen  (primary encounter diagnosis)  Comment:   Plan: REMOVE IMPACTED CERUMEN        We removed the wax with the curette. Instructions given on diagnoses for prevention and avoid Q-tips and other things in the ear canal.   Consider the once a month Debrox or Cerumenex to avoid the buildup. Follow up as needed.             Follow-ups after your visit        Your next 10 appointments already scheduled     Jul 30, 2018  1:00 PM CDT   Return Visit with ADULT MH DAY 4C [facility] ([facility])    [address] [place] [address]   [phone]            Jul 31, 2018  1:00 PM CDT   Return Visit with ADULT MH DAY 4C [facility] ([facility])    [address] [place] [address]   [phone]            Aug 01, 2018  9:20 AM CDT   SHORT with PHILL Rodriguez [facility] ([facility])    [address] [address]   [phone]            Aug 01, 2018 10:45 AM CDT   Nurse Only with ALLERGY [facility] ([facility])    [address] [address]   [phone]           Every allergy patient MUST wait 30 minutes after their allergy shot. No exceptions.  Xolair shots #1-3 should plan to wait 2 hours in clinic Xolair shots after #4 should plan 30 minute wait in clinic            Aug 02, 2018  1:00 PM CDT   Return Visit with ADULT [address]  [facility] ([facility])    [address] [address]   [phone]            Aug 06, 2018  1:00 PM CDT   Return Visit with ADULT MH DAY 4C   [facility] ([facility])    [place] [address] [address]   [phone]            Aug 07, 2018  1:00 PM CDT   Return Visit with ADULT MH DAY 4C   [facility] ([facility])    [place] [address] [address]   [phone]            Aug 09, 2018  1:00 PM CDT   Return Visit with ADULT MH DAY 4C   [facility] ([facility])    [place] [facility] [address] [address]   [phone]            Aug 13, 2018  1:00 PM CDT   Return Visit with ADULT MH DAY 4C   [facility] ([facility])    [facility] [facility] [address] [address]   [phone]            Aug 14, 2018  1:00 PM CDT   Return Visit with ADULT MH DAY 4C   [facility] ([facility])    [place] [facility] [address] [address]   [phone]              Who to contact     If you have questions or need follow up information about today's clinic visit or your schedule please contact [facility] directly at [phone].  Normal or non-critical lab and imaging results will be communicated to you by MyChart, letter or phone within 4 business days after the clinic has received the results. If you do not hear from us within 7 days, please contact the clinic through MyChart or phone. If you have a critical or abnormal lab result, we will notify you by phone as soon as possible.  Submit refill requests through [employer] or call your pharmacy and they will forward the refill  request to us. Please allow 3 business days for your refill to be completed.          Additional Information About Your Visit        Care EveryWhere ID     This is your Care EveryWhere ID. This could be used by other organizations to access your [place] medical records  [id number]        Your Vitals Were     Pulse Temperature Pulse Oximetry BMI (Body Mass Index)          92 96.3  F (35.7  C) (Tympanic) 95% 33.82 kg/m2         Blood Pressure from Last 3 Encounters:   07/30/18 102/80   07/11/18 110/80   07/02/18 128/83    Weight from Last 3 Encounters:   07/30/18 204 lb 3.2 oz (92.6 kg)   07/11/18 201 lb (91.2 kg)   07/02/18 198 lb 8 oz (90 kg)              We Performed the Following     REMOVE JSOHUA HANKS        Primary Care Provider Office Phone # Fax #    Shirley Andrade PHILL Clark [phone] [address] [facility] [address]        Equal Access to Services     ARMOND MCKEON : Hadii aad ku hadasho Soomaali, waaxda luqadaha, qaybta kaalmada adeegyada, waxay idiin hayflorencen srinivasan maciel . So [facility] [phone].    ATENCIÓN: Si habla español, tiene a titus disposición servicios gratuitos de asistencia lingüística. [place] al [phone].    We comply with applicable federal civil rights laws and [place] laws. We do not discriminate on the basis of race, color, national origin, age, disability, sex, sexual orientation, or gender identity.            Thank you!     Thank you for choosing [facility]  for your care. Our goal is always to provide you with excellent care. Hearing back from our patients is one way we can continue to improve our services. Please take a few minutes to complete the written survey that you may receive in the mail after your visit with us. Thank you!             Your Updated Medication List - Protect others around you: Learn how to safely use, store and throw away your medicines at www.disposemymeds.org.          This list is accurate as  of 7/30/18  8:24 AM.  Always use your most recent med list.                   Brand Name Dispense Instructions for use Diagnosis    * ALLERGEN IMMUNOTHERAPY PRESCRIPTION     5 mL    Cat Hair, Standardized 10,000 BAU/mL, ALK  3.0 ml Dog Hair Dander, A. P.  1:100 w/v, HS  1.0 ml Dust Mites F 30,000AU/mL, HS  0.5 ml Dust Mites P. 30,000 AU/mL, HS  0.5 ml  Diluent: HSA qs to 5ml    Allergic rhinitis due to animal dander, Allergic rhinitis due to dust mite       * ALLERGEN IMMUNOTHERAPY PRESCRIPTION     5 mL    Alternaria Tenuis 1:10 w/v, HS  0.5 ml Epicoccum Nigrum 1:10 w/v, HS 0.5 ml Hormodendrum Cladosporioides 1:10 w/v, HS 0.5 ml Diluent: HSA qs to 5ml    Allergic rhinitis due to mold       * ALLERGEN IMMUNOTHERAPY PRESCRIPTION     5 mL    Estuardo, White  1:20 w/v, HS  0.5 ml Birch Mix PRW 1:20 w/v, HS  0.5 ml Elm, American 1:20 w/v, HS  0.5 ml Hackberry 1:20 w/v, HS 0.5 ml Hickory, Shagbark 1:20 w/v, HS  0.5 ml [place] Mix RW 1:20 w/v, HS 0.5 ml Oak Mix RVW 1:20 w/v, HS 0.5 ml Ste[place] Tree, Black 1:20 w/v, HS 0.5 ml Madison, Black 1:20 w/v, HS 0.5 ml Diluent: HSA qs to 5ml    Chronic seasonal allergic rhinitis due to pollen       * ALLERGEN IMMUNOTHERAPY PRESCRIPTION     5 mL    Kochia 1:20 w/v, HS 1.0 ml Nettle 1:20 w/v, HS 1.0 ml Plantain, English 1:20 w/v, HS 1.0 ml Ragweed Mixed 1:20 w/v ALK  0.6 ml Russian Thistle 1:20 w/v, HS 1.0 ml Diluent: HSA qs to 5ml    Chronic seasonal allergic rhinitis due to pollen       cetirizine 10 MG tablet    zyrTEC    60 tablet    Take 1 tablet (10 mg) by mouth 2 times daily    Chronic seasonal allergic rhinitis due to pollen       EPINEPHrine 0.3 MG/0.3ML injection 2-pack    EPIPEN 2-ODETTE    0.6 mL    Inject 0.3 mLs (0.3 mg) into the muscle once as needed for anaphylaxis    Need for desensitization to allergens       * haloperidol 0.5 MG tablet    HALDOL     Take 1 mg by mouth 2 times daily 3mg in the morning;  4 mg at night        * haloperidol 1 MG tablet    HALDOL     Take 7 mg by  mouth daily        hydrOXYzine 25 MG tablet    ATARAX          levonorgestrel 20 MCG/24HR IUD    MIRENA     1 each (20 mcg) by Intrauterine route continuous    Encounter for insertion of mirena IUD       melatonin 3 MG tablet     30 tablet    Take 1 tablet (3 mg) by mouth At Bedtime    Insomnia, unspecified type       ranitidine 150 MG tablet    ZANTAC    60 tablet    Take 1 tablet (150 mg) by mouth 2 times daily    Gastroesophageal reflux disease without esophagitis       topiramate 25 MG tablet    TOPAMAX    90 tablet    25 mg at bedtime for 1 week, 50 mg at bedtime for 1 week and 75 mg daily at bedtime thereafter    Morbid obesity (H)       traZODone 50 MG tablet    DESYREL    30 tablet    Take 1 tablet (50 mg) by mouth At Bedtime    Insomnia, unspecified type       VENTOLIN  (90 Base) MCG/ACT Inhaler   Generic drug:  albuterol           * Notice:  This list has 6 medication(s) that are the same as other medications prescribed for you. Read the directions carefully, and ask your doctor or other care provider to review them with you.

## 2018-07-30 NOTE — PROGRESS NOTES
SUBJECTIVE:   Maddy Ortiz is a 32 year old female who presents to clinic today for the following health issues:      ENT Symptoms- Plugged ears             Symptoms: cc Present Absent Comment   Fever/Chills   x    Fatigue   x    Muscle Aches   x    Eye Irritation   x    Sneezing   x    Nasal Peewee/Drg   x    Sinus Pressure/Pain   x    Loss of smell   x    Dental pain   x    Sore Throat   x    Swollen Glands   x    Ear Pain/Fullness  x  plugged   Cough   x    Wheeze   x    Chest Pain   x    Shortness of breath   x    Rash   x    Other   x      Symptom duration:  one week   Symptom severity:  moderate   Treatments tried:  nothing   Contacts:  n/a       Current Outpatient Prescriptions:      haloperidol (HALDOL) 0.5 MG tablet, Take 1 mg by mouth 2 times daily 3mg in the morning;  4 mg at night, Disp: , Rfl:      haloperidol (HALDOL) 1 MG tablet, Take 7 mg by mouth daily , Disp: , Rfl: 2     ranitidine (ZANTAC) 150 MG tablet, Take 1 tablet (150 mg) by mouth 2 times daily, Disp: 60 tablet, Rfl: 1     topiramate (TOPAMAX) 25 MG tablet, 25 mg at bedtime for 1 week, 50 mg at bedtime for 1 week and 75 mg daily at bedtime thereafter, Disp: 90 tablet, Rfl: 5     cetirizine (ZYRTEC) 10 MG tablet, Take 1 tablet (10 mg) by mouth 2 times daily (Patient not taking: Reported on 7/30/2018), Disp: 60 tablet, Rfl: 11     EPINEPHrine (EPIPEN 2-ODETTE) 0.3 MG/0.3ML injection 2-pack, Inject 0.3 mLs (0.3 mg) into the muscle once as needed for anaphylaxis, Disp: 0.6 mL, Rfl: 3     hydrOXYzine (ATARAX) 25 MG tablet, , Disp: , Rfl: 2     levonorgestrel (MIRENA) 20 MCG/24HR IUD, 1 each (20 mcg) by Intrauterine route continuous, Disp: , Rfl:      melatonin 3 MG tablet, Take 1 tablet (3 mg) by mouth At Bedtime, Disp: 30 tablet, Rfl: 0     ORDER FOR ALLERGEN IMMUNOTHERAPY, Cat Hair, Standardized 10,000 BAU/mL, ALK  3.0 ml Dog Hair Dander, A. P.  1:100 w/v, HS  1.0 ml Dust Mites F 30,000AU/mL, HS  0.5 ml Dust Mites P. 30,000 AU/mL, HS  0.5 ml   Diluent: HSA qs to 5ml, Disp: 5 mL, Rfl: PRN     ORDER FOR ALLERGEN IMMUNOTHERAPY, Alternaria Tenuis 1:10 w/v, HS  0.5 ml Epicoccum Nigrum 1:10 w/v, HS 0.5 ml Hormodendrum Cladosporioides 1:10 w/v, HS 0.5 ml Diluent: HSA qs to 5ml, Disp: 5 mL, Rfl: PRN     ORDER FOR ALLERGEN IMMUNOTHERAPY, Estuardo, White  1:20 w/v, HS  0.5 ml Birch Mix PRW 1:20 w/v, HS  0.5 ml Elm, American 1:20 w/v, HS  0.5 ml Hackberry 1:20 w/v, HS 0.5 ml Hickory, Shagbark 1:20 w/v, HS  0.5 ml [place] Mix RW 1:20 w/v, HS 0.5 ml Oak Mix RVW 1:20 w/v, HS 0.5 ml Sacramento Tree, Black 1:20 w/v, HS 0.5 ml Beaum[place], Black 1:20 w/v, HS 0.5 ml Diluent: HSA qs to 5ml, Disp: 5 mL, Rfl: PRN     ORDER FOR ALLERGEN IMMUNOTHERAPY, Kochia 1:20 w/v, HS 1.0 ml Nettle 1:20 w/v, HS 1.0 ml Plantain, English 1:20 w/v, HS 1.0 ml Ragweed Mixed 1:20 w/v ALK  0.6 ml Russian Thistle 1:20 w/v, HS 1.0 ml Diluent: HSA qs to 5ml, Disp: 5 mL, Rfl: PRN     traZODone (DESYREL) 50 MG tablet, Take 1 tablet (50 mg) by mouth At Bedtime, Disp: 30 tablet, Rfl: 3     VENTOLIN  (90 Base) MCG/ACT Inhaler, , Disp: , Rfl: 2    Patient Active Problem List   Diagnosis     Animal dander allergy     CARDIOVASCULAR SCREENING; LDL GOAL LESS THAN 160     Psychosis     Paranoid type delusional disorder (H)     Bipolar 1 disorder (H)     Insomnia     Depression with anxiety     Seasonal allergic rhinitis due to pollen     Allergic rhinitis due to mold     Allergic rhinitis due to animal dander     Allergic rhinitis due to dust mite     Encounter for IUD insertion     Schizoaffective disorder, bipolar type (H)     Gastroesophageal reflux disease without esophagitis       Blood pressure 102/80, pulse 92, temperature 96.3  F (35.7  C), temperature source Tympanic, weight 204 lb 3.2 oz (92.6 kg), SpO2 95 %, not currently breastfeeding.  'Exam:  GENERAL APPEARANCE: healthy, alert and no distress  HENT: cerumen bilaterally impacted      (H61.23) Bilateral impacted cerumen  (primary encounter  diagnosis)  Comment:   Plan: REMOVE IMPACTED CERUMEN        We removed the wax with the curette. Instructions given on diagnoses for prevention and avoid Q-tips and other things in the ear canal.   Consider the once a month Debrox or Cerumenex to avoid the buildup. Follow up as needed.     Jay Jay Hargrove

## 2018-07-30 NOTE — PATIENT INSTRUCTIONS
(H61.23) Bilateral impacted cerumen  (primary encounter diagnosis)  Comment:   Plan: REMOVE IMPACTED CERUMEN        We removed the wax with the curette. Instructions given on diagnoses for prevention and avoid Q-tips and other things in the ear canal.   Consider the once a month Debrox or Cerumenex to avoid the buildup. Follow up as needed.

## 2018-07-30 NOTE — PROGRESS NOTES
"Adult Mental Health Outpatient Group Therapy Progress Note           Client Initial Individualized Goals for Treatment: \"to prepare for a healthy lifestyle\"        See Initial Treatment suggestions for the client during the time between Diagnostic Assessment and completion of the Master Individualized Treatment Plan.      Treatment Goals:  1.Client will notify staff when needing assistance to develop or implement a coping plan to manage suicidal or self injurious urges.  2. When in self support skills group client will learn and practice 1-2 coping skills to help mange stress,decrease procrastination and make better personal decisions providing an update of progress weekly.  3.  Group Therapy learn and practice 2 skills to manage the anxiety, depression and reduce negative thoughts.   4.  Community Resources: Find a support group near home, such as [facility]      Area of Treatment Focus:  Symptom Management      Therapeutic Interventions/Treatment Strategies:  Support, Feedback, Safety Assessments, Structured Activity and Education      Response to Treatment Strategies:  Accepted Feedback, Listened, Attentive, Accepted Support and Alert      Name of Group: Group Therapy (2:00-2:50)      Group Attendance: 5 of 5      Description and Outcome:        Maddy reported being safe today. She reported that she broke-up with her boyfriend and moved back home with her parents in [place]. She stated that her daughter will live with her on a full-time basis starting on Friday. She stated that she wants to discharge at the end of the week.      She stated that she will start new jobs, after some things are in place.  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  She also reported that she needs to do orientation and training for the PCA job, and has not completed that yet, and " stated that she won't start it for a while. 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      She stated that she fell in love with another man, who is in a wheelchair and may become his PCA.     She reported feeling paranoid about others, stating that people are driving haphazardly on the freeways and that people are acting strange in social situations to her.     She participated in a structured yoga stretch activity for 8-10 minutes.  .    Client verbalized understanding of session content by sharing her thoughts and giving feedback.     Client would benefit from additional opportunities to practice and implement content from this session.     Is this a Weekly Review of the Progress on the Treatment Plan?  Yes.        Are Treatment Plan Goals being addressed?  Yes, continue treatment goals          Are Treatment Plan Strategies to Address Goals Effective?  Yes, continue treatment strategies          Are there any current contracts in place?  No

## 2018-07-30 NOTE — NURSING NOTE
"Initial /80 (BP Location: Left arm, Patient Position: Chair, Cuff Size: Adult Large)  Pulse 92  Temp 96.3  F (35.7  C) (Tympanic)  Wt 204 lb 3.2 oz (92.6 kg)  SpO2 95%  BMI 33.82 kg/m2 Estimated body mass index is 33.82 kg/(m^2) as calculated from the following:    Height as of 7/2/18: 5' 5.16\" (1.655 m).    Weight as of this encounter: 204 lb 3.2 oz (92.6 kg). .    Patience Baca    "

## 2018-07-30 NOTE — DISCHARGE SUMMARY
Adult Mental Health Intensive Outpatient Discharge Summary/Instructions      Patient: Maddy Ortiz MRN: [id number]   : 1985 Age: 32 year old Sex: female     Admission Date: 18  Discharge Date: 18  Diagnosis:     Focus of Treatment / Progress    Personal Safety: Maddy reported being safe during Group Therapy      * Follow your safety plan     * Call crisis lines as needed:    [facility] [phone] [facility] [phone]  [facility] [phone] Crisis Connection [phone]  [facility] [phone] [facility] [phone]  [facility] [phone] National Suicide Prevention [phone]  [facility] [phone] Suicide Prevention [phone]  [facility] [phone]    Managing symptoms of:  Paranoia, depression, anxiety, disorganized thoughts    Community support/health:  [facility] [place] [facility]     [phone]  [facility], [facility]  Tuesdays 3-4 pm,    [facility], 2nd floor, [facility], [address]  [phone]  [facility]  [phone]      Managing Symptoms and Preventing Relapse    * Go to all of your appointments    * Take all medications as directed.      * Carry a current list if medication with you    * Do not use illicit (street) drugs.  Avoid alcohol    * Report these symptoms to your care team. These are early signs of relapse:   Thoughts of suicide   Losing more sleep   Increased confusion   Mood getting worse   Feeling more aggressive   Other:  Keep appointments    *Use these skills daily:  Mindfulness, Reality Checks, Opposite to Emotion, Identify thought distortions, feelings, actions, express self, keep appointments, take medications daily, journal, use gratitude, self-compassion        Copy of summary sent to:     Dr. Rodriguez,  [facility]  Dr. Calabrese,  Psychiatry [facility]  Dr. Olga Loja,    Psychiatry, [facility]  Consuelo John, Therapy [facility]    Follow  up with psychiatrist / main caregiver:   Dr. 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  Please call [phone] with any further questions.      Client Signature:_______________________  Date / Time:___________    Staff Signature:________________________   Date / Time:___________

## 2018-07-31 ENCOUNTER — HOSPITAL ENCOUNTER (OUTPATIENT)
Dept: BEHAVIORAL HEALTH | Facility: CLINIC | Age: 33
End: 2018-07-31
Attending: PSYCHIATRY & NEUROLOGY
Payer: COMMERCIAL

## 2018-07-31 PROCEDURE — H2012 BEHAV HLTH DAY TREAT, PER HR: HCPCS

## 2018-07-31 ASSESSMENT — ANXIETY QUESTIONNAIRES
1. FEELING NERVOUS, ANXIOUS, OR ON EDGE: SEVERAL DAYS
6. BECOMING EASILY ANNOYED OR IRRITABLE: SEVERAL DAYS
3. WORRYING TOO MUCH ABOUT DIFFERENT THINGS: SEVERAL DAYS
GAD7 TOTAL SCORE: 9
IF YOU CHECKED OFF ANY PROBLEMS ON THIS QUESTIONNAIRE, HOW DIFFICULT HAVE THESE PROBLEMS MADE IT FOR YOU TO DO YOUR WORK, TAKE CARE OF THINGS AT HOME, OR GET ALONG WITH OTHER PEOPLE: EXTREMELY DIFFICULT
5. BEING SO RESTLESS THAT IT IS HARD TO SIT STILL: SEVERAL DAYS
2. NOT BEING ABLE TO STOP OR CONTROL WORRYING: SEVERAL DAYS
7. FEELING AFRAID AS IF SOMETHING AWFUL MIGHT HAPPEN: NEARLY EVERY DAY

## 2018-07-31 ASSESSMENT — PATIENT HEALTH QUESTIONNAIRE - PHQ9: 5. POOR APPETITE OR OVEREATING: SEVERAL DAYS

## 2018-07-31 NOTE — PROGRESS NOTES
"Adult Mental Health Outpatient Group Therapy Progress Note     Client Initial Individualized Goals for Treatment: \"to prepare for a healthy lifestyle\"      See Initial Treatment suggestions for the client during the time between Diagnostic Assessment and completion of the Master Individualized Treatment Plan.    Treatment Goals:  1.Client will notify staff when needing assistance to develop or implement a coping plan to manage suicidal or self injurious urges.  2. When in self support skills group client will learn and practice 1-2 coping skills to help mange stress,decrease procrastination and make better personal decisions providing an update of progress weekly.  3.  Group Therapy learn and practice 2 skills to manage the anxiety, depression and reduce negative thoughts.   4.  Community Resources: Find a support group near home, such as [employer]    Area of Treatment Focus:  Symptom Management    Therapeutic Interventions/Treatment Strategies:  Support, Feedback, Safety Assessments, Structured Activity and Education    Response to Treatment Strategies:  Accepted Feedback, Listened, Attentive, Accepted Support and Alert    Name of Group: Life Skills (3:00-3:50)    Group Attendance: 4 of 9    Description and Outcome:  Client presented with even mood with good concentration and focus.Psychoeducation information presented included a discussion related to sources of stress and coping skillsThought process clear,goal directed and reality based.  Goal #1 (no personal safety concerns reported or observed). Goal #2 Addressed per discussion.  Client would benefit from additional opportunities to practice and implement content from this session in her every day life and mental health recovery.    Is this a Weekly Review of the Progress on the Treatment Plan?  Yes.      Are Treatment Plan Goals being addressed?  Yes, continue treatment goals      Are Treatment Plan Strategies to Address Goals Effective?  Yes, continue treatment " strategies      Are there any current contracts in place?  No

## 2018-07-31 NOTE — PROGRESS NOTES
"Adult Mental Health Outpatient Group Therapy Progress Note           Client Initial Individualized Goals for Treatment: \"to prepare for a healthy lifestyle\"        See Initial Treatment suggestions for the client during the time between Diagnostic Assessment and completion of the Master Individualized Treatment Plan.      Treatment Goals:  1.Client will notify staff when needing assistance to develop or implement a coping plan to manage suicidal or self injurious urges.  2. When in self support skills group client will learn and practice 1-2 coping skills to help mange stress,decrease procrastination and make better personal decisions providing an update of progress weekly.  3.  Group Therapy learn and practice 2 skills to manage the anxiety, depression and reduce negative thoughts.   4.  Community Resources: Find a support group near home, such as [facility]      Area of Treatment Focus:  Symptom Management      Therapeutic Interventions/Treatment Strategies:  Support, Feedback, Safety Assessments, Structured Activity and Education      Response to Treatment Strategies:  Accepted Feedback, Listened, Attentive, Accepted Support and Alert      Name of Group: Group Therapy (2:00-2:50)  Mental Health Management (1:00 - 1:50)      Group Attendance: 4 of 6      Description and Outcome:                         Maddy reported being safe today. She reported that she organized and cleaned her apartment, biked to [employer] to get groceries, and and is eating healthy food.  She stated that she is taking her medication as prescribed.  she reported that she has a new relationship with Kenny, and that she broke-up with her boyfriend, and was happy to be in a new relationship.   She reported feeling fatigued today and that she wanted to discharge today from the program.  She reported that she has job interviews and believes that she will move into a job position soon.  She stated that her daughter will live with her on a full-time basis " starting on Friday.      She stated that other people were acting strange around her, and when staff questioned her about this, she said it was strangers who were acting strange, not the staff in the psychiatry offices. She stated that she wants to change psychiatry clinics, and made an appointment with Yoselin Gutierrez, Dr. Milner, for psychiatry.  Her chart notes that she has been seen at the following clinics:  [facility]:    Dr. Rodriguez  [facility]   [facility]                           She stated that she will start new jobs.   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 Calabrese for her note.  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                                                     She reported feeling paranoid about others, stating that people are driving haphazardly on the freeways and that people are acting strange in social situations to her.                          Mental Health Management                                The group participated in a structured activity that involved listening to a guided meditation about deep breathing and mindfulness. The group read a worksheet about thought distortions and related their negative thoughts to their anxiety, depression, or other feelings, and to their psychosis thoughts.  The group discussed thought distortions and what type they recognized. They discussed negative thoughts and psychosis symptoms and what sort of thoughts came from paranoia, and how they could manage the symptoms. The group discussed how thoughts, feelings and actions are related.  They put together a coping book for skills.    .    Client verbalized  understanding of session content by sharing her thoughts and giving feedback.      Client would benefit from additional opportunities to practice and implement content from this session.      Is this a Weekly Review of the Progress on the Treatment Plan?  Yes.        Are Treatment Plan Goals being addressed?  Yes, continue treatment goals          Are Treatment Plan Strategies to Address Goals Effective?  Yes, continue treatment strategies          Are there any current contracts in place?  No

## 2018-08-01 ENCOUNTER — ALLIED HEALTH/NURSE VISIT (OUTPATIENT)
Dept: ALLERGY | Facility: CLINIC | Age: 33
End: 2018-08-01
Payer: COMMERCIAL

## 2018-08-01 DIAGNOSIS — J30.9 ALLERGIC RHINITIS: Primary | ICD-10-CM

## 2018-08-01 PROCEDURE — 95117 IMMUNOTHERAPY INJECTIONS: CPT

## 2018-08-01 PROCEDURE — 99207 ZZC DROP WITH A PROCEDURE: CPT

## 2018-08-01 NOTE — MR AVS SNAPSHOT
After Visit Summary   8/1/2018    Maddy Ortiz    MRN: [id number]           Patient Information     Date Of Birth          1985        Visit Information        Provider Department      8/1/2018 10:45 AM ALLERGY [facility]        Today's Diagnoses     Allergic rhinitis    -  1       Follow-ups after your visit        Your next 10 appointments already scheduled     Aug 02, 2018  1:00 PM CDT   Return Visit with ADULT  DAY 4C   [facility] ([facility])    [address] [place] [address]   [phone]            Aug 06, 2018  1:00 PM CDT   Return Visit with ADULT MH DAY 4C   [facility] ([facility])    [address] [place] [address]   [phone]            Aug 07, 2018  1:00 PM CDT   Return Visit with ADULT MH DAY 4C   [facility] ([facility])    [address] [place] [address]   [phone]            Aug 09, 2018  1:00 PM CDT   Return Visit with ADULT MH DAY 4C   [facility] ([facility])    [address] [place] [address]   [phone]            Aug 13, 2018  1:00 PM CDT   Return Visit with ADULT  DAY 4C   [facility] ([facility])    [address] [place] [address]   [phone]            Aug 14, 2018  1:00 PM CDT   Return Visit with ADULT  DAY 4C   [facility] ([facility])    [address] [place] [address]   [phone]            Aug 15, 2018 10:45 AM CDT   Nurse Only with ALLERGY [facility] ([facility])    [address] [facility]  [place] [address]   [phone]           Every allergy patient MUST wait 30 minutes after their allergy shot. No exceptions.  Xolair shots #1-3 should plan to wait 2 hours in clinic Xolair shots after #4 should plan 30 minute wait in clinic            Aug 16, 2018  1:00 PM CDT   Return Visit with ADULT  DAY 4C   [facility] ([facility])    [address] [address]   [phone]            Aug 20, 2018  1:00 PM CDT   Return Visit with ADULT  DAY 4C   [facility] ([facility])    [facility] [address] [address]   [phone]            Aug 21, 2018  1:00 PM CDT   Return Visit with ADULT  DAY 4C   [facility] ([facility])    [facility] [address] [address]   [phone]              Who to contact     If you have questions or need follow up information about today's clinic visit or your schedule please contact [facility] directly at [phone].  Normal or non-critical lab and imaging results will be communicated to you by MyChart, letter or phone within 4 business days after the clinic has received the results. If you do not hear from us within 7 days, please contact the clinic through MyChart or phone. If you have a critical or abnormal lab result, we will notify you by phone as soon as possible.  Submit refill requests through [employer] or call your pharmacy and they will forward the refill request to us. Please allow 3 business days for your refill to be completed.          Additional Information About Your Visit        Care EveryWhere ID     This is your Care EveryWhere ID. This could be used by other organizations to access your [facility] medical records  [id number]         Blood Pressure from Last 3 Encounters:   07/30/18 102/80   07/11/18 110/80    07/02/18 128/83    Weight from Last 3 Encounters:   07/30/18 92.6 kg (204 lb 3.2 oz)   07/11/18 91.2 kg (201 lb)   07/02/18 90 kg (198 lb 8 oz)              We Performed the Following     Allergy Shot: Two or more injections        Primary Care Provider Office Phone # Fax #    Shirley Freeman, APRN [facility] [phone] [address] [facility] [address]        Equal Access to Services     ARMOND MCKEON : Hadii aad ku hadasho Soomaali, waaxda luqadaha, qaybta kaalmada adeegyada, waxay idiin hayaan adeeg kharachele maciel . So [facility] [phone].    ATENCIÓN: Si habla español, tiene a titus disposición servicios gratuitos de asistencia lingüística. Llame al [phone].    We comply with applicable federal civil rights laws and [place] laws. We do not discriminate on the basis of race, color, national origin, age, disability, sex, sexual orientation, or gender identity.            Thank you!     Thank you for choosing [facility]  for your care. Our goal is always to provide you with excellent care. Hearing back from our patients is one way we can continue to improve our services. Please take a few minutes to complete the written survey that you may receive in the mail after your visit with us. Thank you!             Your Updated Medication List - Protect others around you: Learn how to safely use, store and throw away your medicines at www.disposemymeds.org.          This list is accurate as of 8/1/18 12:04 PM.  Always use your most recent med list.                   Brand Name Dispense Instructions for use Diagnosis    * ALLERGEN IMMUNOTHERAPY PRESCRIPTION     5 mL    Cat Hair, Standardized 10,000 BAU/mL, ALK  3.0 ml Dog Hair Dander, A. P.  1:100 w/v, HS  1.0 ml Dust Mites F 30,000AU/mL, HS  0.5 ml Dust Mites P. 30,000 AU/mL, HS  0.5 ml  Diluent: HSA qs to 5ml    Allergic rhinitis due to animal dander, Allergic rhinitis due to dust mite       * ALLERGEN IMMUNOTHERAPY  PRESCRIPTION     5 mL    Alternaria Tenuis 1:10 w/v, HS  0.5 ml Epicoccum Nigrum 1:10 w/v, HS 0.5 ml Hormodendrum Cladosporioides 1:10 w/v, HS 0.5 ml Diluent: HSA qs to 5ml    Allergic rhinitis due to mold       * ALLERGEN IMMUNOTHERAPY PRESCRIPTION     5 mL    Estuardo, White  1:20 w/v, HS  0.5 ml Birch Mix PRW 1:20 w/v, HS  0.5 ml Elm, American 1:20 w/v, HS  0.5 ml Hackberry 1:20 w/v, HS 0.5 ml Hickory, Shagbark 1:20 w/v, HS  0.5 ml [place] Mix RW 1:20 w/v, HS 0.5 ml Oak Mix RVW 1:20 w/v, HS 0.5 ml [place] Tree, Black 1:20 w/v, HS 0.5 ml [place], Black 1:20 w/v, HS 0.5 ml Diluent: HSA qs to 5ml    Chronic seasonal allergic rhinitis due to pollen       * ALLERGEN IMMUNOTHERAPY PRESCRIPTION     5 mL    Kochia 1:20 w/v, HS 1.0 ml Nettle 1:20 w/v, HS 1.0 ml Plantain, English 1:20 w/v, HS 1.0 ml Ragweed Mixed 1:20 w/v ALK  0.6 ml Russian Thistle 1:20 w/v, HS 1.0 ml Diluent: HSA qs to 5ml    Chronic seasonal allergic rhinitis due to pollen       cetirizine 10 MG tablet    zyrTEC    60 tablet    Take 1 tablet (10 mg) by mouth 2 times daily    Chronic seasonal allergic rhinitis due to pollen       EPINEPHrine 0.3 MG/0.3ML injection 2-pack    EPIPEN 2-ODETTE    0.6 mL    Inject 0.3 mLs (0.3 mg) into the muscle once as needed for anaphylaxis    Need for desensitization to allergens       * haloperidol 0.5 MG tablet    HALDOL     Take 1 mg by mouth 2 times daily 3mg in the morning;  4 mg at night        * haloperidol 1 MG tablet    HALDOL     Take 7 mg by mouth daily        hydrOXYzine 25 MG tablet    ATARAX          levonorgestrel 20 MCG/24HR IUD    MIRENA     1 each (20 mcg) by Intrauterine route continuous    Encounter for insertion of mirena IUD       melatonin 3 MG tablet     30 tablet    Take 1 tablet (3 mg) by mouth At Bedtime    Insomnia, unspecified type       ranitidine 150 MG tablet    ZANTAC    60 tablet    Take 1 tablet (150 mg) by mouth 2 times daily    Gastroesophageal reflux disease without esophagitis        topiramate 25 MG tablet    TOPAMAX    90 tablet    25 mg at bedtime for 1 week, 50 mg at bedtime for 1 week and 75 mg daily at bedtime thereafter    Morbid obesity (H)       traZODone 50 MG tablet    DESYREL    30 tablet    Take 1 tablet (50 mg) by mouth At Bedtime    Insomnia, unspecified type       VENTOLIN  (90 Base) MCG/ACT Inhaler   Generic drug:  albuterol           * Notice:  This list has 6 medication(s) that are the same as other medications prescribed for you. Read the directions carefully, and ask your doctor or other care provider to review them with you.

## 2018-08-01 NOTE — PROGRESS NOTES
Patient presented after waiting 30 minutes with no reaction to  injections. Discharged from clinic.    Cami CINTRON RN   Specialty Clinics

## 2018-08-01 NOTE — PROGRESS NOTES
"Adult Mental Health Outpatient Group Therapy Progress Note     Client Initial Individualized Goals for Treatment: \"to prepare for a healthy lifestyle\"      See Initial Treatment suggestions for the client during the time between Diagnostic Assessment and completion of the Master Individualized Treatment Plan.    Treatment Goals:  1.Client will notify staff when needing assistance to develop or implement a coping plan to manage suicidal or self injurious urges.  2. When in self support skills group client will learn and practice 1-2 coping skills to help mange stress,decrease procrastination and make better personal decisions providing an update of progress weekly.  3.  Group Therapy learn and practice 2 skills to manage the anxiety, depression and reduce negative thoughts.   4.  Community Resources: Find a support group near home, such as [facility]    Area of Treatment Focus:  Symptom Management    Therapeutic Interventions/Treatment Strategies:  Support, Feedback, Safety Assessments, Structured Activity and Education    Response to Treatment Strategies:  Accepted Feedback, Listened, Attentive, Accepted Support and Alert    Name of Group: Life Skills (3:00-3:50)    Group Attendance: 4 of 9    Description and Outcome:  Client presented with even mood with good concentration and focus during her last day in this program.Psychoeducation information presented included a discussion related to strategies to improve motivation.Thought process clear,goal directed and reality based.  Goal #1 (no personal safety concerns reported or observed). Goal #2 Goal met.  Client would benefit from additional opportunities to practice and implement content from this session in her every day life and mental health recovery.    Is this a Weekly Review of the Progress on the Treatment Plan?  Yes.      Are Treatment Plan Goals being addressed?  Yes, client discharged      Are Treatment Plan Strategies to Address Goals Effective?  Yes, client " discharged      Are there any current contracts in place?  No

## 2018-08-02 ENCOUNTER — TELEPHONE (OUTPATIENT)
Dept: BEHAVIORAL HEALTH | Facility: CLINIC | Age: 33
End: 2018-08-02

## 2018-08-02 NOTE — TELEPHONE ENCOUNTER
"To [facility] clinic staff to coordinate care:  Upon discharge from Adult Day Tx:  Maddy noted that she has seen many psychiatrists and switched  Clinics since \"people were acting strange towards me.\"  When questioned if this  Was the staff, she said that it was strangers, and not the staff.  She has been seen by these psychiatrists for medications at these clinics:    [facility]:   Dr. Rodriguez, Psychiatry  [facility]  Dr. Calabrese, Psychiatry  [facility]   , Psychiatry      Nely Arthur, Psy., D,  L.P.  Adult Day Tx    "

## 2018-08-03 ASSESSMENT — ANXIETY QUESTIONNAIRES: GAD7 TOTAL SCORE: 9

## 2018-08-03 ASSESSMENT — PATIENT HEALTH QUESTIONNAIRE - PHQ9: SUM OF ALL RESPONSES TO PHQ QUESTIONS 1-9: 13

## 2018-08-15 ENCOUNTER — ALLIED HEALTH/NURSE VISIT (OUTPATIENT)
Dept: ALLERGY | Facility: CLINIC | Age: 33
End: 2018-08-15
Payer: COMMERCIAL

## 2018-08-15 DIAGNOSIS — J30.9 ALLERGIC RHINITIS: Primary | ICD-10-CM

## 2018-08-15 PROCEDURE — 99207 ZZC DROP WITH A PROCEDURE: CPT

## 2018-08-15 PROCEDURE — 95117 IMMUNOTHERAPY INJECTIONS: CPT

## 2018-08-15 NOTE — MR AVS SNAPSHOT
After Visit Summary   8/15/2018    Maddy Ortiz    MRN: [id number]           Patient Information     Date Of Birth          1985        Visit Information        Provider Department      8/15/2018 1:15 PM ALLERGY [facility]        Today's Diagnoses     Allergic rhinitis    -  1       Follow-ups after your visit        Your next 10 appointments already scheduled     Aug 29, 2018 10:30 AM CDT   Nurse Only with ALLERGY [facility])    [address] [address]   [phone]           Every allergy patient MUST wait 30 minutes after their allergy shot. No exceptions.  Xolair shots #1-3 should plan to wait 2 hours in clinic Xolair shots after #4 should plan 30 minute wait in clinic            Sep 12, 2018 10:45 AM CDT   Nurse Only with ALLERGY [facility] ([facility])    [address] [address]   [phone]           Every allergy patient MUST wait 30 minutes after their allergy shot. No exceptions.  Xolair shots #1-3 should plan to wait 2 hours in clinic Xolair shots after #4 should plan 30 minute wait in clinic            Oct 15, 2018 10:45 AM CDT   (Arrive by 10:30 AM)   Return Weight Management Visit with Maldonado Michele MD   [facility] ([facility])    [address] [place] [address]   [phone]            Nov 05, 2018 11:00 AM CST   (Arrive by 10:45 AM)   New Patient Visit with Maldonado Michele MD   [facility] ([facility])    [address] [place] [address]   [phone]              Who to contact     If you have questions or need follow up information about today's clinic visit or your schedule please contact [facility] directly at  [phone].  Normal or non-critical lab and imaging results will be communicated to you by MyChart, letter or phone within 4 business days after the clinic has received the results. If you do not hear from us within 7 days, please contact the clinic through MyChart or phone. If you have a critical or abnormal lab result, we will notify you by phone as soon as possible.  Submit refill requests through [employer] or call your pharmacy and they will forward the refill request to us. Please allow 3 business days for your refill to be completed.          Additional Information About Your Visit        Care EveryWhere ID     This is your Care EveryWhere ID. This could be used by other organizations to access your [facility] medical records  [id number]         Blood Pressure from Last 3 Encounters:   07/30/18 102/80   07/11/18 110/80   07/02/18 128/83    Weight from Last 3 Encounters:   07/30/18 92.6 kg (204 lb 3.2 oz)   07/11/18 91.2 kg (201 lb)   07/02/18 90 kg (198 lb 8 oz)              We Performed the Following     Allergy Shot: Two or more injections        Primary Care Provider Office Phone # Fax #    Shirley Andrade Elaina Freeman, PHILL [facility] [phone] [address] [facility] [address]        Equal Access to Services     ARMOND MCKEON : Hadii aad ku hadasho Soomaali, waaxda luqadaha, qaybta kaalmada adeegyada, waxay idiin hayaan srinivasan maciel . So [facility] [phone].    ATENCIÓN: Si habla español, tiene a titus disposición servicios gratuitos de asistencia lingüística. Yadira al [phone].    We comply with applicable federal civil rights laws and [place] laws. We do not discriminate on the basis of race, color, national origin, age, disability, sex, sexual orientation, or gender identity.            Thank you!     Thank you for choosing [facility]  for your care. Our goal is always to provide you with excellent care. Hearing back from our patients is one way we can continue  to improve our services. Please take a few minutes to complete the written survey that you may receive in the mail after your visit with us. Thank you!             Your Updated Medication List - Protect others around you: Learn how to safely use, store and throw away your medicines at www.disposemymeds.org.          This list is accurate as of 8/15/18  2:12 PM.  Always use your most recent med list.                   Brand Name Dispense Instructions for use Diagnosis    cetirizine 10 MG tablet    zyrTEC    60 tablet    Take 1 tablet (10 mg) by mouth 2 times daily    Chronic seasonal allergic rhinitis due to pollen       EPINEPHrine 0.3 MG/0.3ML injection 2-pack    EPIPEN 2-ODETTE    0.6 mL    Inject 0.3 mLs (0.3 mg) into the muscle once as needed for anaphylaxis    Need for desensitization to allergens       * haloperidol 0.5 MG tablet    HALDOL     Take 1 mg by mouth 2 times daily 3mg in the morning;  4 mg at night        * haloperidol 1 MG tablet    HALDOL     Take 7 mg by mouth daily        hydrOXYzine 25 MG tablet    ATARAX          levonorgestrel 20 MCG/24HR IUD    MIRENA     1 each (20 mcg) by Intrauterine route continuous    Encounter for insertion of mirena IUD       melatonin 3 MG tablet     30 tablet    Take 1 tablet (3 mg) by mouth At Bedtime    Insomnia, unspecified type       * ORDER FOR ALLERGEN IMMUNOTHERAPY 5 mL vial     5 mL    Cat Hair, Standardized 10,000 BAU/mL, ALK  3.0 ml Dog Hair Dander, A. P.  1:100 w/v, HS  1.0 ml Dust Mites F 30,000AU/mL, HS  0.5 ml Dust Mites P. 30,000 AU/mL, HS  0.5 ml  Diluent: HSA qs to 5ml    Allergic rhinitis due to animal dander, Allergic rhinitis due to dust mite       * ORDER FOR ALLERGEN IMMUNOTHERAPY 5 mL vial     5 mL    Alternaria Tenuis 1:10 w/v, HS  0.5 ml Epicoccum Nigrum 1:10 w/v, HS 0.5 ml Hormodendrum Cladosporioides 1:10 w/v, HS 0.5 ml Diluent: HSA qs to 5ml    Allergic rhinitis due to mold       * ORDER FOR ALLERGEN IMMUNOTHERAPY 5 mL vial     5 mL    Estuardo,  White  1:20 w/v, HS  0.5 ml Birch Mix PRW 1:20 w/v, HS  0.5 ml Elm, American 1:20 w/v, HS  0.5 ml Hackberry 1:20 w/v, HS 0.5 ml Hickory, Shagbark 1:20 w/v, HS  0.5 ml [place] Mix RW 1:20 w/v, HS 0.5 ml Oak Mix RVW 1:20 w/v, HS 0.5 ml [place] Tree, Black 1:20 w/v, HS 0.5 ml [place], Black 1:20 w/v, HS 0.5 ml Diluent: HSA qs to 5ml    Chronic seasonal allergic rhinitis due to pollen       * ORDER FOR ALLERGEN IMMUNOTHERAPY 5 mL vial     5 mL    Kochia 1:20 w/v, HS 1.0 ml Nettle 1:20 w/v, HS 1.0 ml Plantain, English 1:20 w/v, HS 1.0 ml Ragweed Mixed 1:20 w/v ALK  0.6 ml Russian Thistle 1:20 w/v, HS 1.0 ml Diluent: HSA qs to 5ml    Chronic seasonal allergic rhinitis due to pollen       ranitidine 150 MG tablet    ZANTAC    60 tablet    Take 1 tablet (150 mg) by mouth 2 times daily    Gastroesophageal reflux disease without esophagitis       topiramate 25 MG tablet    TOPAMAX    90 tablet    25 mg at bedtime for 1 week, 50 mg at bedtime for 1 week and 75 mg daily at bedtime thereafter    Morbid obesity (H)       traZODone 50 MG tablet    DESYREL    30 tablet    Take 1 tablet (50 mg) by mouth At Bedtime    Insomnia, unspecified type       VENTOLIN  (90 Base) MCG/ACT inhaler   Generic drug:  albuterol           * Notice:  This list has 6 medication(s) that are the same as other medications prescribed for you. Read the directions carefully, and ask your doctor or other care provider to review them with you.

## 2018-08-24 ENCOUNTER — HOSPITAL ENCOUNTER (INPATIENT)
Facility: CLINIC | Age: 33
LOS: 5 days | Discharge: HOME | End: 2018-08-29
Attending: EMERGENCY MEDICINE | Admitting: PSYCHIATRY & NEUROLOGY
Payer: MEDICAID

## 2018-08-24 DIAGNOSIS — F22 PARANOIA (H): Primary | ICD-10-CM

## 2018-08-24 DIAGNOSIS — R41.89 DISORGANIZED THINKING: ICD-10-CM

## 2018-08-24 DIAGNOSIS — F22 PARANOIA (PSYCHOSIS) (H): ICD-10-CM

## 2018-08-24 DIAGNOSIS — F25.0 SCHIZOAFFECTIVE DISORDER, BIPOLAR TYPE (H): ICD-10-CM

## 2018-08-24 LAB
AMPHETAMINES UR QL SCN: NEGATIVE
BARBITURATES UR QL: NEGATIVE
BENZODIAZ UR QL: NEGATIVE
CANNABINOIDS UR QL SCN: NEGATIVE
COCAINE UR QL: NEGATIVE
HCG UR QL: NEGATIVE
OPIATES UR QL SCN: NEGATIVE
PCP UR QL SCN: NEGATIVE

## 2018-08-24 PROCEDURE — 81025 URINE PREGNANCY TEST: CPT | Performed by: EMERGENCY MEDICINE

## 2018-08-24 PROCEDURE — 90791 PSYCH DIAGNOSTIC EVALUATION: CPT

## 2018-08-24 PROCEDURE — 12400006 ZZH R&B MH INTERMEDIATE

## 2018-08-24 PROCEDURE — 25000132 ZZH RX MED GY IP 250 OP 250 PS 637: Performed by: PSYCHIATRY & NEUROLOGY

## 2018-08-24 PROCEDURE — 99285 EMERGENCY DEPT VISIT HI MDM: CPT | Mod: 25

## 2018-08-24 PROCEDURE — 80307 DRUG TEST PRSMV CHEM ANLYZR: CPT | Performed by: EMERGENCY MEDICINE

## 2018-08-24 RX ORDER — EPINEPHRINE 0.3 MG/.3ML
0.3 INJECTION SUBCUTANEOUS
Status: DISCONTINUED | OUTPATIENT
Start: 2018-08-24 | End: 2018-08-24

## 2018-08-24 RX ORDER — CETIRIZINE HYDROCHLORIDE 10 MG/1
10 TABLET ORAL 2 TIMES DAILY PRN
Status: DISCONTINUED | OUTPATIENT
Start: 2018-08-24 | End: 2018-08-29 | Stop reason: HOSPADM

## 2018-08-24 RX ORDER — CETIRIZINE HYDROCHLORIDE 10 MG/1
10 TABLET ORAL 2 TIMES DAILY PRN
COMMUNITY
End: 2020-03-17

## 2018-08-24 RX ORDER — CLONAZEPAM 0.5 MG/1
0.5 TABLET ORAL 2 TIMES DAILY PRN
Status: DISCONTINUED | OUTPATIENT
Start: 2018-08-24 | End: 2018-08-29 | Stop reason: HOSPADM

## 2018-08-24 RX ORDER — TRAZODONE HYDROCHLORIDE 50 MG/1
50 TABLET, FILM COATED ORAL
Status: DISCONTINUED | OUTPATIENT
Start: 2018-08-24 | End: 2018-08-29 | Stop reason: HOSPADM

## 2018-08-24 RX ORDER — HALOPERIDOL 5 MG/1
10 TABLET ORAL AT BEDTIME
Status: DISCONTINUED | OUTPATIENT
Start: 2018-08-24 | End: 2018-08-25

## 2018-08-24 RX ADMIN — TRAZODONE HYDROCHLORIDE 50 MG: 50 TABLET ORAL at 22:01

## 2018-08-24 RX ADMIN — RANITIDINE 150 MG: 150 TABLET ORAL at 20:53

## 2018-08-24 RX ADMIN — HALOPERIDOL 10 MG: 5 TABLET ORAL at 20:53

## 2018-08-24 ASSESSMENT — ACTIVITIES OF DAILY LIVING (ADL)
ORAL_HYGIENE: INDEPENDENT
GROOMING: INDEPENDENT
DRESS: SCRUBS (BEHAVIORAL HEALTH)

## 2018-08-24 NOTE — ED PROVIDER NOTES
"  History     Chief Complaint:  \"I'm having disorganized thinking\"     HPI   Maddy Ortiz is a 32 year old female, with EPIC records showing history of bipolar disorder, schizophrenia, and depression among other conditions who presents with feeling disorganized. Per DEC report, the patient was sent over by Dr. Milner from [facility] because she appeared paranoid and disorganized, to be admitted to the psychiatric unit. This is the first time this patient has met with Dr. Milner, and he notes that he is concerned for this patient as he is not confident she is taking her medications. Furthermore, she presented to him today with her 10 year old daughter, and he is further concerned that she may not be fit at the moment to care for her. Child services, and the patient's parents have been contacted regarding this, and per patient report, the parents are on their way to  her daughter. The patient has a notable history of schizoaffective disorder, and was admitted near the end of April this year into May. While in the ED, the patient reports that for the last week or so she has had \"disorganized thinking,\" prompting her visit to [facility] today. The patient notes that she is also concerned that she may be experiencing schizophrenic hallucinations, as she notes that is able to \"code and decode messages from other people.\" The patient otherwise denies suicidal or homicidal intent, or any drug or alcohol abuse. 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  The patient is currently taking Clonopin, Topamax, Haldol (7x 1mg/day), and trazodone as needed for sleep aid.     Allergies:  Animal Dander  Nkda [No Known Drug Allergies]  Seasonal Allergies    Medications:    Zyrtec  Epinephrine  Haldol  Atarax  Mirena  Topamax  Desyrel  Zantac  Clonopin  Trazodone     Past Medical History:    GERD  Schizoaffective " "disorder  Bipolar disorder  Depression  Anxiety  Paranoid delusional disorder  Psychosis    Past Surgical History:    Tonsillectomy  Adenoidectomy    Family History:    History reviewed. No pertinent family history.     Social History:  The patient was accompanied to the ED by her daughter.  Smoking Status: Never  Smokeless Tobacco: Never  Alcohol Use: Yes  Occupation: PCA  Marital Status:  Single     Review of Systems   Unable to perform ROS: Psychiatric disorder     Physical Exam   First Vitals:  BP: 130/90  Pulse: 89  Heart Rate: 89  Temp: 97.7  F (36.5  C)  Resp: 16  Height: 160 cm (5' 3\")  Weight: 88.5 kg (195 lb)  SpO2: 98 %    Physical Exam  General: woman sitting upright in room 16, daughter at her side in [facility]  HENT: mucous membranes moist  Eyes: PERRL without nystagmus  CV: extremities well perfused, regular rhythm  Resp:  normal effort, clear throughout  GI: abdomen soft and nontender, no guarding  MSK: no bony tenderness   Skin: appropriately warm and dry  Neuro: alert, clear speech, oriented, normal tone in extremities, ambulatory  Psych: calm, cooperative, denies feeling suicidal, reports severe paranoia and disorganized thought process      Emergency Department Course   Laboratory:  Drug abuse screen: Negative  HCG Qualitative Urine: Negative      Emergency Department Course:  Nursing notes and vitals reviewed.    I spoke with DEC, who had received a call from Dr. Milner prior to the patient's arrival.    1441: I performed an exam of the patient as documented above.  1628: Patient rechecked and updated.      Admitted to [facility] unit.    Impression & Plan      Medical Decision Making:  She was referred here by psychiatrist for mental health hospitalization.  Medical triggers for her presenting symptoms were considered, but are not suspected based on her current history and exam, as well as the fact that she had laboratory studies done under similar circumstances just a few months ago which " "were unremarkable.  She has been accepted to the mental health unit here by a psychiatrist who saw her in clinic.  She was not markedly agitated here nor did she require emergency medication while in the emergency department.  She is accepted by Dr. Milner to the seventh floor here at [facility].  She is voluntary and wishes to be hospitalized to further address the symptoms so she was not placed on a 72 hour hold.  No evidence of intoxication.      Diagnosis:    ICD-10-CM    1. Paranoia (H) F22 HCG qualitative urine     Drug abuse screen 77 urine ([place], , )   2. Disorganized thinking R41.89        Disposition:  Admitted to Dr. Milner, Psychiatry at [facility]    This record was created at least in part using electronic voice recognition software, so please excuse any \"typos.\"      Wendie Mcduffie  8/24/2018    EMERGENCY DEPARTMENT  I, Wendie Mcduffie, am serving as a scribe at 2:41 PM on 8/24/2018 to document services personally performed by Eder Alvarenga,  based on my observations and the provider's statements to me.       Eder Alvarenga MD  08/24/18 [id number]    "

## 2018-08-24 NOTE — PHARMACY-ADMISSION MEDICATION HISTORY
Admission medication history interview status for the 8/24/2018  admission is complete. See EPIC admission navigator for prior to admission medications     Medication history source reliability:Moderate    Actions taken by pharmacist (provider contacted, etc): interviewed pt.     Additional medication history information not noted on PTA med list :    Clonazepam and Trazodone clarified with [facility].  --  Pt goes to the clinic for her allergy shots.  Some she receives every other week and some every month.    Medication reconciliation/reorder completed by provider prior to medication history? No    Time spent in this activity: 15 minutes      Prior to Admission medications    Medication Sig Last Dose Taking? Auth Provider   cetirizine (ZYRTEC) 10 MG tablet Take 10 mg by mouth 2 times daily as needed for allergies prn Yes Unknown, Entered By History   ClonazePAM (KLONOPIN PO) Take 1 mg by mouth daily as needed for anxiety  prn Yes Unknown, Entered By History   EPINEPHrine (EPIPEN 2-ODETTE) 0.3 MG/0.3ML injection 2-pack Inject 0.3 mLs (0.3 mg) into the muscle once as needed for anaphylaxis prn Yes Magdy Blevins DO   haloperidol (HALDOL) 1 MG tablet Take 7 mg by mouth At Bedtime  8/23/2018 at hs Yes Reported, Patient   levonorgestrel (MIRENA) 20 MCG/24HR IUD 1 each (20 mcg) by Intrauterine route continuous  Yes Krista Keller MD   ranitidine (ZANTAC) 150 MG tablet Take 150 mg by mouth At Bedtime 8/23/2018 at hs Yes Unknown, Entered By History   traZODone (DESYREL) 50 MG tablet Take 50 mg by mouth nightly as needed for sleep  prn Yes Olga Loja APRN CNS   ORDER FOR ALLERGEN IMMUNOTHERAPY Cat Hair, Standardized 10,000 BAU/mL, ALK  3.0 ml  Dog Hair Dander, A. P.  1:100 w/v, HS  1.0 ml  Dust Mites F 30,000AU/mL, HS  0.5 ml  Dust Mites P. 30,000 AU/mL, HS  0.5 ml   Diluent: HSA qs to 5ml   Magdy Blevins DO   ORDER FOR ALLERGEN IMMUNOTHERAPY Alternaria Tenuis 1:10 w/v, HS  0.5  ml  Epicoccum Nigrum 1:10 w/v, HS 0.5 ml  Hormodendrum Cladosporioides 1:10 w/v, HS 0.5 ml  Diluent: HSA qs to 5ml   Magdy Blevins,    ORDER FOR ALLERGEN IMMUNOTHERAPY Estuardo, White  1:20 w/v, HS  0.5 ml  Birch Mix PRW 1:20 w/v, HS  0.5 ml  Elm, American 1:20 w/v, HS  0.5 ml  Hackberry 1:20 w/v, HS 0.5 ml  Hickory, Shagbark 1:20 w/v, HS  0.5 ml  Randolph Mix RW 1:20 w/v, HS 0.5 ml  Oak Mix RVW 1:20 w/v, HS 0.5 ml  Live Oak Tree, Black 1:20 w/v, HS 0.5 ml  Moore, Black 1:20 w/v, HS 0.5 ml  Diluent: HSA qs to 5ml   Magdy Blevins DO   ORDER FOR ALLERGEN IMMUNOTHERAPY Kochia 1:20 w/v, HS 1.0 ml  Nettle 1:20 w/v, HS 1.0 ml  Plantain, English 1:20 w/v, HS 1.0 ml  Ragweed Mixed 1:20 w/v ALK  0.6 ml  Russian Thistle 1:20 w/v, HS 1.0 ml  Diluent: HSA qs to 5ml   Magdy Blevins,

## 2018-08-24 NOTE — PROGRESS NOTES
Welcome packet reviewed with patient. Information reviewed includes getting emergency help, preventing infections, understanding your care, using medication safely, reducing falls, preventing pressure ulcers, smoking cessation, powerful choices and Patients Bill of Rights. Pt. given tour of the unit and instruction on use of facility including emergency call light. Program schedule reviewed with patient. Questions regarding the unit addressed. Pt. Search completed and belongings inventoried. Nursing assessment complete including patient and medication profiles. Risk assessments completed addressing suicide,fall,skin,nutrition and safety issues. Care plan initiated. Assessments reviewed with physician and admit orders received. Video monitoring in progress, Patient Informed.    Nursing assessment complete including patient and medication profiles. Risk assessments completed addressing suicide,fall,skin,nutrition and safety issues. Care plan initiated. Assessments reviewed with physician and admit orders received. Video monitoring in progress, Patient Informed.

## 2018-08-24 NOTE — IP AVS SNAPSHOT
MRN:[id number]                      After Visit Summary   8/24/2018    Maddy Ortiz    MRN: [id number]           Thank you!     Thank you for choosing [facility] for your care. Our goal is always to provide you with excellent care.        Patient Information     Date Of Birth          1985        Designated Caregiver       Most Recent Value    Caregiver    Will someone help with your care after discharge? no      About your hospital stay     You were admitted on:  August 24, 2018 You last received care in the:  [facility]    You were discharged on:  August 29, 2018       Who to Call     For medical emergencies, please call 911.  For non-urgent questions about your medical care, please call your primary care provider or clinic, [phone]          Attending Provider     Provider Specialty    Eder Alvarenga MD Emergency Medicine    Alf, Krishna Bynum MD Psychiatry    Harshad Gee MD Psychiatry       Primary Care Provider Office Phone # Fax #    Shirley Andrade Elaina Freeman, PH[facility] [phone]      Your next 10 appointments already scheduled     Aug 30, 2018  1:00 PM CDT   Nurse Only with ALLERGY [facility] ([facility])    [address] [address]   [phone]           Every allergy patient MUST wait 30 minutes after their allergy shot. No exceptions.  Xolair shots #1-3 should plan to wait 2 hours in clinic Xolair shots after #4 should plan 30 minute wait in clinic            Sep 12, 2018 10:45 AM CDT   Nurse Only with ALLERGY [facility] ([facility])    [address] [address]   [phone]           Every allergy patient MUST wait 30 minutes after their allergy shot. No exceptions.  Xolair shots #1-3 should plan to wait 2 hours in clinic Xolair shots after #4 should plan 30 minute wait in clinic             Oct 15, 2018 10:45 AM CDT   (Arrive by 10:30 AM)   Return Weight Management Visit with Maldonado Michele MD   [facility] ([facility])    [address] [place] [address]   [phone]            Nov 05, 2018 11:00 AM CST   (Arrive by 10:45 AM)   New Patient Visit with Maldonado Michele MD   [facility] ([facility])    [address] [place] [address]   [phone]              Further instructions from your care team       Behavioral Discharge Planning and Instructions    Summary:  Admitted for psychosis.     Main Diagnosis:   Schizoaffective Disorder, bipolar type.     Major Treatments, Procedures and Findings:  Psychiatric assessment. Medication adjustment.     Symptoms to Report: Feeling more agitated, Increased confusion or psychosis, Losing more sleep, Mood getting worse or Thoughts of suicide    Lifestyle Adjustment:  Follow all treatment recommendations. Develop and follow safety plan.     Psychiatry Follow-up:     We have called and left a message with scheduling at [facility]. If we are unable to get an appointment set up prior to discharge, please call and schedule a post hospital follow up visit with Dr. Krishna Milner at [facility] in two to four weeks so that you can get your medications refilled.     [facility]  [address]  [place]  [phone] / Fax [phone]    You have a therapy appointment scheduled with Consuelo at [facility] in [place] on Thursday, August 30, 2018 at 11:00 am.     [address]  [phone] / Fax [phone]     A report was made to Child Protective Services by your physician upon admission. If you have any questions, please contact Child Protective Services directly.     [facility]   [phone] or  "[phone] (after hours and on weekends)    [facility]   [phone]    Resources:   Crisis Intervention: 285.288.3447 or 279-717-6451 (TTY: 498.861.8942).  Call anytime for help.  National [place] on Mental Illness (www.mn.vincent.org): 531.283.2041 or 083-007-7222.  National Suicide Prevention Line (www.mentalhealthmn.org): 946-522-BNJF (3937)  If you or someone you know is experiencing a mental health crisis, intervention services are available for [facility] residents through [employer] by calling [phone]. This phone line is staffed 24 hours a day, seven days a week.    General Medication Instructions:   See your medication sheet(s) for instructions.   Take all medicines as directed.  Make no changes unless your doctor suggests them.   Go to all your doctor visits.  Be sure to have all your required lab tests. This way, your medicines can be refilled on time.  Do not use any drugs not prescribed by your doctor.  Avoid alcohol.      Pending Results     No orders found from 8/22/2018 to 8/25/2018.            Statement of Approval     Ordered          08/29/18 6260  I have reviewed and agree with all the recommendations and orders detailed in this document.  EFFECTIVE NOW     Approved and electronically signed by:  Harshad Gee MD             Admission Information     Date & Time Provider Department Dept. Phone    8/24/2018 Harshad Gee MD [facility] [phone]      Your Vitals Were     Blood Pressure Pulse Temperature Respirations Height Weight    130/83 85 97.4  F (36.3  C) (Oral) 16 1.6 m (5' 3\") 90.5 kg (199 lb 8 oz)    Pulse Oximetry BMI (Body Mass Index)                97% 35.34 kg/m2          Care EveryWhere ID     This is your Care EveryWhere ID. This could be used by other organizations to access your [facility] [place] medical records  [id number]        Equal Access to Services     ARMOND MCKEON AH: Hadii pancho Block " dilmaraysa sally solano, lincoln chapaaan ah. So [facility] [phone].    ATENCIÓN: Si elida west, tiene a titus disposición servicios gratuitos de asistencia lingüística. Yadira al [phone].    We comply with applicable federal civil rights laws and [place] laws. We do not discriminate on the basis of race, color, national origin, age, disability, sex, sexual orientation, or gender identity.               Review of your medicines      START taking        Dose / Directions    lurasidone 60 MG Tabs tablet   Commonly known as:  LATUDA        Dose:  60 mg   Take 1 tablet (60 mg) by mouth daily   Quantity:  30 tablet   Refills:  0       topiramate 25 MG tablet   Commonly known as:  TOPAMAX   Used for:  Schizoaffective disorder, bipolar type (H)        Dose:  75 mg   Take 3 tablets (75 mg) by mouth 2 times daily   Quantity:  180 tablet   Refills:  0       ziprasidone 80 MG capsule   Commonly known as:  GEODON        Dose:  80 mg   Take 1 capsule (80 mg) by mouth At Bedtime   Quantity:  30 capsule   Refills:  0         CONTINUE these medicines which have NOT CHANGED        Dose / Directions    cetirizine 10 MG tablet   Commonly known as:  zyrTEC        Dose:  10 mg   Take 10 mg by mouth 2 times daily as needed for allergies   Refills:  0       EPINEPHrine 0.3 MG/0.3ML injection 2-pack   Commonly known as:  EPIPEN 2-ODETTE   Used for:  Need for desensitization to allergens        Dose:  0.3 mg   Inject 0.3 mLs (0.3 mg) into the muscle once as needed for anaphylaxis   Quantity:  0.6 mL   Refills:  3       KLONOPIN PO        Dose:  1 mg   Take 1 mg by mouth daily as needed for anxiety   Refills:  0       levonorgestrel 20 MCG/24HR IUD   Commonly known as:  MIRENA   Used for:  Encounter for insertion of mirena IUD        Dose:  1 each   1 each (20 mcg) by Intrauterine route continuous   Refills:  0       * ORDER FOR ALLERGEN IMMUNOTHERAPY 5 mL vial   Used for:  Allergic rhinitis due to animal  dander, Allergic rhinitis due to dust mite        Cat Hair, Standardized 10,000 BAU/mL, ALK  3.0 ml Dog Hair Dander, A. P.  1:100 w/v, HS  1.0 ml Dust Mites F 30,000AU/mL, HS  0.5 ml Dust Mites P. 30,000 AU/mL, HS  0.5 ml  Diluent: HSA qs to 5ml   Quantity:  5 mL   Refills:  PRN       * ORDER FOR ALLERGEN IMMUNOTHERAPY 5 mL vial   Used for:  Allergic rhinitis due to mold        Alternaria Tenuis 1:10 w/v, HS  0.5 ml Epicoccum Nigrum 1:10 w/v, HS 0.5 ml Hormodendrum Cladosporioides 1:10 w/v, HS 0.5 ml Diluent: HSA qs to 5ml   Quantity:  5 mL   Refills:  PRN       * ORDER FOR ALLERGEN IMMUNOTHERAPY 5 mL vial   Used for:  Chronic seasonal allergic rhinitis due to pollen        Estuardo, White  1:20 w/v, HS  0.5 ml Birch Mix PRW 1:20 w/v, HS  0.5 ml Elm, American 1:20 w/v, HS  0.5 ml Hackberry 1:20 w/v, HS 0.5 ml Hickory, Shagbark 1:20 w/v, HS  0.5 ml Powers Mix RW 1:20 w/v, HS 0.5 ml Oak Mix RVW 1:20 w/v, HS 0.5 ml [place]mwood Tree, Black 1:20 w/v, HS 0.5 ml [place], Black 1:20 w/v, HS 0.5 ml Diluent: HSA qs to 5ml   Quantity:  5 mL   Refills:  PRN       * ORDER FOR ALLERGEN IMMUNOTHERAPY 5 mL vial   Used for:  Chronic seasonal allergic rhinitis due to pollen        Kochia 1:20 w/v, HS 1.0 ml Nettle 1:20 w/v, HS 1.0 ml Plantain, English 1:20 w/v, HS 1.0 ml Ragweed Mixed 1:20 w/v ALK  0.6 ml Russian Thistle 1:20 w/v, HS 1.0 ml Diluent: HSA qs to 5ml   Quantity:  5 mL   Refills:  PRN       ranitidine 150 MG tablet   Commonly known as:  ZANTAC        Dose:  150 mg   Take 150 mg by mouth At Bedtime   Refills:  0       traZODone 50 MG tablet   Commonly known as:  DESYREL   Used for:  Insomnia, unspecified type        Dose:  50 mg   Take 50 mg by mouth nightly as needed for sleep   Quantity:  30 tablet   Refills:  3       * Notice:  This list has 4 medication(s) that are the same as other medications prescribed for you. Read the directions carefully, and ask your doctor or other care provider to review them with you.      STOP taking      haloperidol 1 MG tablet   Commonly known as:  HALDOL                Where to get your medicines      These medications were sent to [facility] [address] [place] [address] [place], [place] - [address]  [address] [place] [address]     Phone:  [phone]     lurasidone 60 MG Tabs tablet    topiramate 25 MG tablet    ziprasidone 80 MG capsule                Protect others around you: Learn how to safely use, store and throw away your medicines at www.disposemymeds.org.             Medication List: This is a list of all your medications and when to take them. Check marks below indicate your daily home schedule. Keep this list as a reference.      Medications           Morning Afternoon Evening Bedtime As Needed    cetirizine 10 MG tablet   Commonly known as:  zyrTEC   Take 10 mg by mouth 2 times daily as needed for allergies                                      EPINEPHrine 0.3 MG/0.3ML injection 2-pack   Commonly known as:  EPIPEN 2-ODETTE   Inject 0.3 mLs (0.3 mg) into the muscle once as needed for anaphylaxis                                   KLONOPIN PO   Take 1 mg by mouth daily as needed for anxiety   Last time this was given:  0.5 mg on 8/26/2018  3:18 PM                                   levonorgestrel 20 MCG/24HR IUD   Commonly known as:  MIRENA   1 each (20 mcg) by Intrauterine route continuous                                lurasidone 60 MG Tabs tablet   Commonly known as:  LATUDA   Take 1 tablet (60 mg) by mouth daily   Last time this was given:  40 mg on 8/28/2018  5:05 PM                    5-6 PM with dinner               * ORDER FOR ALLERGEN IMMUNOTHERAPY 5 mL vial   Cat Hair, Standardized 10,000 BAU/mL, ALK  3.0 ml Dog Hair Dander, A. P.  1:100 w/v, HS  1.0 ml Dust Mites F 30,000AU/mL, HS  0.5 ml Dust Mites P. 30,000 AU/mL, HS  0.5 ml  Diluent: HSA qs to 5ml                                * ORDER FOR ALLERGEN IMMUNOTHERAPY 5 mL vial   Alternaria Tenuis 1:10 w/v, HS   0.5 ml Epicoccum Nigrum 1:10 w/v, HS 0.5 ml Hormodendrum Cladosporioides 1:10 w/v, HS 0.5 ml Diluent: HSA qs to 5ml                                * ORDER FOR ALLERGEN IMMUNOTHERAPY 5 mL vial   Estuardo, White  1:20 w/v, HS  0.5 ml Birch Mix PRW 1:20 w/v, HS  0.5 ml Elm, American 1:20 w/v, HS  0.5 ml Hackberry 1:20 w/v, HS 0.5 ml Hickory, Shagbark 1:20 w/v, HS  0.5 ml [employer] Mix RW 1:20 w/v, HS 0.5 ml Oak Mix RVW 1:20 w/v, HS 0.5 ml [place] Tree, Black 1:20 w/v, HS 0.5 ml [place], Black 1:20 w/v, HS 0.5 ml Diluent: HSA qs to 5ml                                * ORDER FOR ALLERGEN IMMUNOTHERAPY 5 mL vial   Kochia 1:20 w/v, HS 1.0 ml Nettle 1:20 w/v, HS 1.0 ml Plantain, English 1:20 w/v, HS 1.0 ml Ragweed Mixed 1:20 w/v ALK  0.6 ml Russian Thistle 1:20 w/v, HS 1.0 ml Diluent: HSA qs to 5ml                                ranitidine 150 MG tablet   Commonly known as:  ZANTAC   Take 150 mg by mouth At Bedtime   Last time this was given:  150 mg on 8/28/2018  9:53 PM                                   topiramate 25 MG tablet   Commonly known as:  TOPAMAX   Take 3 tablets (75 mg) by mouth 2 times daily   Last time this was given:  75 mg on 8/28/2018  9:53 PM                                      traZODone 50 MG tablet   Commonly known as:  DESYREL   Take 50 mg by mouth nightly as needed for sleep   Last time this was given:  50 mg on 8/27/2018 10:02 PM                            For sleep       ziprasidone 80 MG capsule   Commonly known as:  GEODON   Take 1 capsule (80 mg) by mouth At Bedtime   Last time this was given:  80 mg on 8/28/2018  9:56 PM                                   * Notice:  This list has 4 medication(s) that are the same as other medications prescribed for you. Read the directions carefully, and ask your doctor or other care provider to review them with you.              More Information        Schizoaffective Disorder  Schizoaffective disorder is an illness in which a psychotic person also has symptoms of a  mood disorder such as depression, or bipolar disorder.  Schizophrenia is a chronic, often disabling mental health disorder that makes functioning in work and society difficult. It is a type of psychosis, and involves perceiving reality differently from those around you. The difference been reality and what you think become blurred in your mind. The cause of schizophrenia is not yet known. It is believed to be a result of genetic and biological factors like brain chemistry and structure. Symptoms of schizophrenia include:    Hallucinations (seeing or hearing things that are not there)    Delusions (false beliefs)    Disorganized thinking and speech    Social withdrawal    Severe anxiety    Feeling unreal    Paranoia    Insomnia    Trouble thinking or concentrating clearly    Depression, feeling suicidal    Withdrawal from those around you  Depression is one type of mood disorder that is related to brain chemistry. It is not just a state of unhappiness or sadness but a true disease. You may feel a lack of interest in normal activities. Sometimes there is sadness or guilt without any clear reason. Thinking may become slow and there can be a lack energy or feeling of hopelessness. Some people have thoughts of harming themselves at this stage. Thoughts can even turn to suicide.  Bipolar disorder (also called manic depression) is the other major mood disorder. It is an illness that causes strong mood swings between depression and sofía. In the manic phase you may think fast and do things quickly. It may seem like you are getting a lot done. At first, this may feel very good; but in the extreme this can lead to a life style that is disorganized, chaotic and includes risky behavior (spending sprees, sexual acting out or drug use). In later stages, it may affect eating (no interest in food) and sleeping (unable to sleep for days at a time). Speech may speed up and become difficult for others to understand. You may appear to  others as if you are in your own world.  The exact cause of schizoaffective disorder is unknown. However, a person is more likely to get this illness if a family member has it. Use of drugs such as amphetamines (speed) and cocaine increase the risk of getting this disorder. People with this illness will generally have to treat it long-term. Medicine and psychotherapy can help.  Home care    On-going care and support helps people manage this illness.  Find a healthcare provider and therapist who meet your needs.    Be sure to take your prescribed medicine as directed, even if you think you don t need it.    Get the required lab work to check youroverall health and certain you are getting the right amount of medication    Seek support from trusted friends or family by talking about your feelings and thoughts. Ask them to help you recognize behavior changes early so you can get help and, if needed, medicines can be adjusted.    Tell each of your healthcare providers about all of the prescription medicines, over-the-counter medicines, and supplements you take. Certain supplements interact with medicines and can cause dangerous side effects.  Ask your pharmacist when you have questions about medicine interactions.    If you are having trouble managing workplace issues, or caring for yourself because of this illness, contact your local Americans with Disabilities (ADA) office to see if they can help.  The U.S. Department  of Justice operates a toll-free ADA information line at: 778.479.2642 (Voice), or 171-210-6511 (TTY).  They can help you find a local office.    Don't use amphetamines, cocaine, and related street drugs. These will only make your condition worse.  Follow-up care  Follow up with your healthcare provider, or as advised.  Call 911  Call 911 if you:    Have suicidal thoughts, a suicide plan, and the means to carry out the plan    Have trouble breathing    Are very confused    Are very drowsy or have trouble  awakening    Faint or lose of consciousness    Have a rapid heart rate, very low heart rate, or a new irregular heart rate    Have a seizure  When to seek medical advice  Call your healthcare provider right away if any of these occur:    Feeling like your symptoms are getting worse    Feeling out of control or that you are being controlled by others    Feeling like you want to harm yourself or another    Unable to care for yourself    Worsening hallucinations (hearing voices)    Worsening depression or anxiety  Date Last Reviewed: 9/29/2015 2000-2017 [employer]. [address] [place] [address]. All rights reserved. This information is not intended as a substitute for professional medical care. Always follow your healthcare professional's instructions.

## 2018-08-24 NOTE — PROGRESS NOTES
08/24/18 3049   Patient Belongings   Did you bring any home meds/supplements to the hospital?  No   Patient Belongings other (see comments)   Disposition of Belongings Locker   Belongings Search Yes   Clothing Search Yes   Second Staff Camilla   General Info Comment All belongings searched and placed in locker     1 pair sandals   White jacket  Dress    Underwear x 1   Bra x 1   1 pair of sunglasses  Cell phone   Papers   Folders   3 clip boards   1 Planner   1 pair pf socks  1 roll of packing tape   Tote bag   Makeup bag   1 pair of scissors   Flash drive  Lip gloss  Mascara   Wallet   Loose change   Insurance card   Business cards   Student ID   $43.00 cash     In Security Envelope #[id number]:  CareCredit #[id number]  [employer] Visa #[id number]  [place] EBT #[id number]  [employer]cf Visa #[id number]  [employer] Redcard #[id number]  [employer] Visa #[id number]  [employer] Card $25.00  [employer] mastercard #[id number]    In security Envelope #[id number]:  2 Epi pens     A               Admission:  I am responsible for any personal items that are not sent to the safe or pharmacy.  [facility] is not responsible for loss, theft or damage of any property in my possession.    Signature:  _________________________________ Date: _______  Time: _____                                              Staff Signature:  ____________________________ Date: ________  Time: _____      2nd Staff person, if patient is unable/unwilling to sign:    Signature: ________________________________ Date: ________  Time: _____     Discharge:  Council has returned all of my personal belongings:    Signature: _________________________________ Date: ________  Time: _____                                          Staff Signature:  ____________________________ Date: ________  Time: _____

## 2018-08-24 NOTE — ED NOTES
Bed: ED16  Expected date:   Expected time:   Means of arrival:   Comments:  nicolle BERRY. Triage

## 2018-08-24 NOTE — PLAN OF CARE
Problem: Psychotic Symptoms  Goal: Psychotic Symptoms  Signs and symptoms of listed problems will be absent or manageable.   8/24 Admit note-  32 yr. Old female admitted with a history of schizoaffective disorder, bipolar type.  Patient was seen by Dr. Milner today.  She is presenting with decompensating behaviors:  Paranoia, disorganization, & tangential thinking.  She was an in-pt. In  on 05/18.  It is unknown if patient has been medication compliant.  Evidently, patient has been yelling at her neighbors & is paranoid about .  CPS involved per MD.  Patient denies any suicidal or homicidal ideation.

## 2018-08-24 NOTE — PROGRESS NOTES
"Pt is outpatient of Dr. Milner who came due to some paranoia behavior in the neighborhood yelling that some people are out to get her and her daughter. Pt has hx of schizoaffective disorder bipolar type. Pt is alert and oriented x 4 but with disorganized thoughts. Pt is pleasant and cooperative with blunt affect but calm mood. Pt has a 10 years old daughter and CPS has been involved. Pt Dad went home with the child. Pt stated that her medications have not been working much and would need adjustment. My stressors include a boy friend I met that does not care and continue to block my number. I just need someone that cares about me\". Denies SI but reports pain of 8/10.    "

## 2018-08-24 NOTE — ED NOTES
Ayad called and informed that patient is on her way up to unit with security and ERT now that med rec has been completed by pharmacy.

## 2018-08-24 NOTE — IP AVS SNAPSHOT
[place] [address] [place] [address]    Phone:  [phone]                                       After Visit Summary   8/24/2018    Maddy Ortiz    MRN: [id number]           After Visit Summary Signature Page     I have received my discharge instructions, and my questions have been answered. I have discussed any challenges I see with this plan with the nurse or doctor.    ..........................................................................................................................................  Patient/Patient Representative Signature      ..........................................................................................................................................  Patient Representative Print Name and Relationship to Patient    ..................................................               ................................................  Date                                            Time    ..........................................................................................................................................  Reviewed by Signature/Title    ...................................................              ..............................................  Date                                                            Time          22EPIC Rev 08/18

## 2018-08-25 PROCEDURE — 99207 ZZC CONSULT E&M CHANGED TO SUBSEQUENT LEVEL: CPT | Performed by: INTERNAL MEDICINE

## 2018-08-25 PROCEDURE — 99232 SBSQ HOSP IP/OBS MODERATE 35: CPT | Performed by: INTERNAL MEDICINE

## 2018-08-25 PROCEDURE — 12400006 ZZH R&B MH INTERMEDIATE

## 2018-08-25 PROCEDURE — 25000132 ZZH RX MED GY IP 250 OP 250 PS 637: Performed by: PSYCHIATRY & NEUROLOGY

## 2018-08-25 RX ORDER — ZIPRASIDONE HYDROCHLORIDE 40 MG/1
40 CAPSULE ORAL 2 TIMES DAILY WITH MEALS
Status: DISCONTINUED | OUTPATIENT
Start: 2018-08-25 | End: 2018-08-27

## 2018-08-25 RX ORDER — HALOPERIDOL 5 MG/1
5 TABLET ORAL AT BEDTIME
Status: DISCONTINUED | OUTPATIENT
Start: 2018-08-25 | End: 2018-08-28

## 2018-08-25 RX ORDER — ALBUTEROL SULFATE 90 UG/1
2 AEROSOL, METERED RESPIRATORY (INHALATION) 4 TIMES DAILY PRN
Status: DISCONTINUED | OUTPATIENT
Start: 2018-08-25 | End: 2018-08-29 | Stop reason: HOSPADM

## 2018-08-25 RX ORDER — TOPIRAMATE 25 MG/1
75 TABLET, FILM COATED ORAL AT BEDTIME
Status: DISCONTINUED | OUTPATIENT
Start: 2018-08-25 | End: 2018-08-29 | Stop reason: HOSPADM

## 2018-08-25 RX ADMIN — RANITIDINE 150 MG: 150 TABLET ORAL at 20:49

## 2018-08-25 RX ADMIN — CLONAZEPAM 0.5 MG: 0.5 TABLET ORAL at 11:58

## 2018-08-25 RX ADMIN — HALOPERIDOL 5 MG: 5 TABLET ORAL at 20:49

## 2018-08-25 RX ADMIN — TOPIRAMATE 75 MG: 25 TABLET, FILM COATED ORAL at 20:49

## 2018-08-25 RX ADMIN — ZIPRASIDONE HCL 40 MG: 40 CAPSULE ORAL at 17:49

## 2018-08-25 RX ADMIN — CLONAZEPAM 0.5 MG: 0.5 TABLET ORAL at 14:54

## 2018-08-25 RX ADMIN — ZIPRASIDONE HCL 40 MG: 40 CAPSULE ORAL at 12:54

## 2018-08-25 ASSESSMENT — ACTIVITIES OF DAILY LIVING (ADL)
DRESS: STREET CLOTHES;INDEPENDENT
GROOMING: INDEPENDENT
ORAL_HYGIENE: INDEPENDENT

## 2018-08-25 NOTE — PROGRESS NOTES
"SPIRITUAL HEALTH SERVICES Progress Note  [facility]     visited pt on request. Pt reports that she had an episode of \"disorganized thinking\" and is getting her meds adjusted. Pt has a new job as a  and hopes to dc before the 29th so she can start work. Pt reports that she has a 10 YO daughter and has been \"trying to find a  and father\" on dating web sites. Pt reports jose this has not worked well and her current boyfriend is now blocking her calls. Pt reports that her parents are close and supportive. Pt reports she is Shinto but does not attend [facility]. Pt reports that she has never had an issue being med compliant. Pt was seeking advice on what she should do.     provided generous reflective nonjudgmental listening and support and facilitated pt's exploration of her gifts and options for fulfilling the desires of her heart. Pt affirmed that she may not be able to find the kind of relationship she seeks using her current strategy and laughed about some experiences. Pt welcomed prayer.    Pt appears resilient and names a good support system. Pt struggles with self image issues.     remains available by request. Pt plans to attend spirituality group tomorrow.    Lanre Trevino M.Div.  Chaplain Resident  [phone] Pager  "
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
## 2018-08-25 NOTE — PLAN OF CARE
Problem: Psychotic Symptoms  Goal: Psychotic Symptoms  1.Signs and symptoms of listed problems will be absent or manageable.   2. Pt will verbalized their emotions  3. Pt will aheld to medication regiment.  4. Will attend groups to learn coping skills   Outcome: No Change   Pt presented as anxious and tense in the morning as her medications were not ordered properly but this was fixed after she met with her doctor. Pt then appeared to calm down. She attended part of activity group but was withdrawn. Pt then bed rested the remainder of the shift.
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
## 2018-08-26 PROCEDURE — 25000132 ZZH RX MED GY IP 250 OP 250 PS 637: Performed by: PSYCHIATRY & NEUROLOGY

## 2018-08-26 PROCEDURE — 90853 GROUP PSYCHOTHERAPY: CPT

## 2018-08-26 PROCEDURE — 12400006 ZZH R&B MH INTERMEDIATE

## 2018-08-26 RX ADMIN — CLONAZEPAM 0.5 MG: 0.5 TABLET ORAL at 15:18

## 2018-08-26 RX ADMIN — ZIPRASIDONE HCL 40 MG: 40 CAPSULE ORAL at 17:36

## 2018-08-26 RX ADMIN — ZIPRASIDONE HCL 40 MG: 40 CAPSULE ORAL at 08:41

## 2018-08-26 RX ADMIN — HALOPERIDOL 5 MG: 5 TABLET ORAL at 21:53

## 2018-08-26 RX ADMIN — TOPIRAMATE 75 MG: 25 TABLET, FILM COATED ORAL at 21:53

## 2018-08-26 RX ADMIN — RANITIDINE 150 MG: 150 TABLET ORAL at 21:53

## 2018-08-26 ASSESSMENT — ACTIVITIES OF DAILY LIVING (ADL)
GROOMING: INDEPENDENT
ORAL_HYGIENE: INDEPENDENT
ORAL_HYGIENE: INDEPENDENT
GROOMING: INDEPENDENT
DRESS: STREET CLOTHES;INDEPENDENT
DRESS: SCRUBS (BEHAVIORAL HEALTH);INDEPENDENT

## 2018-08-26 NOTE — PLAN OF CARE
Problem: Psychotic Symptoms  Goal: Psychotic Symptoms  1.Signs and symptoms of listed problems will be absent or manageable.   2. Pt will verbalized their emotions  3. Pt will aheld to medication regiment.  4. Will attend groups to learn coping skills   Outcome: No Change  Patient remained isolative & withdrawn to her bed most of the shift.  She was encouraged to attend groups, but declined.  Consumed all of her dinner & is drinking po fluids.  Denies any suicidal ideation/hallucinations.  Thought process appears quite disorganized during the 1:1.  The patient is difficult to follow.  She is medication compliant.
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
## 2018-08-27 PROCEDURE — 90791 PSYCH DIAGNOSTIC EVALUATION: CPT

## 2018-08-27 PROCEDURE — 12400006 ZZH R&B MH INTERMEDIATE

## 2018-08-27 PROCEDURE — 90853 GROUP PSYCHOTHERAPY: CPT

## 2018-08-27 PROCEDURE — 25000132 ZZH RX MED GY IP 250 OP 250 PS 637: Performed by: PSYCHIATRY & NEUROLOGY

## 2018-08-27 RX ORDER — LURASIDONE HYDROCHLORIDE 20 MG/1
20 TABLET, FILM COATED ORAL DAILY
Status: DISCONTINUED | OUTPATIENT
Start: 2018-08-27 | End: 2018-08-28

## 2018-08-27 RX ORDER — ZIPRASIDONE HYDROCHLORIDE 40 MG/1
80 CAPSULE ORAL AT BEDTIME
Status: DISCONTINUED | OUTPATIENT
Start: 2018-08-27 | End: 2018-08-29 | Stop reason: HOSPADM

## 2018-08-27 RX ADMIN — TRAZODONE HYDROCHLORIDE 50 MG: 50 TABLET ORAL at 22:02

## 2018-08-27 RX ADMIN — TOPIRAMATE 75 MG: 25 TABLET, FILM COATED ORAL at 20:06

## 2018-08-27 RX ADMIN — RANITIDINE 150 MG: 150 TABLET ORAL at 20:06

## 2018-08-27 RX ADMIN — ZIPRASIDONE HCL 80 MG: 40 CAPSULE ORAL at 20:06

## 2018-08-27 RX ADMIN — LURASIDONE HYDROCHLORIDE 20 MG: 20 TABLET, FILM COATED ORAL at 17:45

## 2018-08-27 RX ADMIN — TRAZODONE HYDROCHLORIDE 50 MG: 50 TABLET ORAL at 02:21

## 2018-08-27 RX ADMIN — HALOPERIDOL 5 MG: 5 TABLET ORAL at 20:06

## 2018-08-27 ASSESSMENT — ACTIVITIES OF DAILY LIVING (ADL)
GROOMING: INDEPENDENT
DRESS: SCRUBS (BEHAVIORAL HEALTH);INDEPENDENT
ORAL_HYGIENE: INDEPENDENT

## 2018-08-27 NOTE — PLAN OF CARE
Problem: Psychotic Symptoms  Goal: Psychotic Symptoms  1.Signs and symptoms of listed problems will be absent or manageable.   2. Pt will verbalized their emotions  3. Pt will aheld to medication regiment.  4. Will attend groups to learn coping skills   Pt presents with a flat affect but brightens with conversation.  Pt's thought process seems tangential and disorganized.  Pt up all shift.  Attended all groups and participated appropriately.  Pt denies SI or hallucinations but seems to be responding at times and somewhat internally preoccupied.
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
## 2018-08-27 NOTE — PROGRESS NOTES
"SPIRITUAL HEALTH SERVICES Progress Note  [facility]     was consulted because pt would like a \"Zoroastrianism consultation.\"  will fill out a referral for a .      did not speak with pt because pt is currently in group.     will follow up with pt later today as available to inform her of the referral.    Add:  returned later in the day to inform pt of the referral for the . The , Father Reji, said he would visit pt on Tuesday as long as no emergencies came up. Pt asked  if a prayer on a [facility] bookmark is copyrighted. Pt would like to share the prayer, a Cheondoism [employer] Kindness meditation, with her friends.  will look into this and get back with pt tomorrow as available. Pt requested prayer for herself and for her niece Jeannette.  provided prayer.  will follow up tomorrow as available.    Jeannette Gipson  Chaplain Resident  Pager: [phone]  Office: [phone]  "
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
## 2018-08-27 NOTE — PLAN OF CARE
Problem: General Plan of Care (Inpatient Behavioral)  Goal: Team Discussion  Team Plan:   Outcome: No Change  BEHAVIORAL TEAM DISCUSSION    Participants: Dr. Gee, social workers, nurses, psych asst  Progress: Pt is doing okay, not much change. Pt will have her meds adjusted. Pt is not requesting to leave  Continued Stay Criteria/Rationale: Pt needs further treatment, pt is encouraged to attend groups, pt meds will be adjusted  Medical/Physical: Latuda 20 mg. 80 mg Geodon at night  Precautions:   Behavioral Orders   Procedures     Code 1 - Restrict to Unit     Routine Programming     As clinically indicated     Status 15     Every 15 minutes.     Plan: Pt has not made much improvement, pts meds are being adjusted. Pt will remain on the unit  Rationale for change in precautions or plan: Pt needs to be monitored to on new medication, pt needs more treatment on the unit.       Problem: Patient Care Overview  Goal: Team Discussion  Team Plan:   Outcome: No Change  BEHAVIORAL TEAM DISCUSSION    Participants: Dr. Gee, social workers, nurses, psych asst  Progress: Pt is doing okay, not much change. Pt will have her meds adjusted. Pt is not requesting to leave  Continued Stay Criteria/Rationale: Pt needs further treatment, pt is encouraged to attend groups, pt meds will be adjusted  Medical/Physical: Latuda 20 mg. 80 mg Geodon at night  Precautions:   Behavioral Orders   Procedures     Code 1 - Restrict to Unit     Routine Programming     As clinically indicated     Status 15     Every 15 minutes.     Plan: Pt has not made much improvement, pts meds are being adjusted. Pt will remain on the unit  Rationale for change in precautions or plan: Pt needs to be monitored to on new medication, pt needs more treatment on the unit.

## 2018-08-28 PROCEDURE — 90853 GROUP PSYCHOTHERAPY: CPT

## 2018-08-28 PROCEDURE — 12400006 ZZH R&B MH INTERMEDIATE

## 2018-08-28 PROCEDURE — 25000132 ZZH RX MED GY IP 250 OP 250 PS 637: Performed by: PSYCHIATRY & NEUROLOGY

## 2018-08-28 RX ORDER — HALOPERIDOL 2 MG/1
2 TABLET ORAL AT BEDTIME
Status: DISCONTINUED | OUTPATIENT
Start: 2018-08-28 | End: 2018-08-29 | Stop reason: HOSPADM

## 2018-08-28 RX ORDER — LURASIDONE HYDROCHLORIDE 40 MG/1
40 TABLET, FILM COATED ORAL DAILY
Status: DISCONTINUED | OUTPATIENT
Start: 2018-08-28 | End: 2018-08-29

## 2018-08-28 RX ADMIN — RANITIDINE 150 MG: 150 TABLET ORAL at 21:53

## 2018-08-28 RX ADMIN — ZIPRASIDONE HCL 80 MG: 40 CAPSULE ORAL at 21:56

## 2018-08-28 RX ADMIN — HALOPERIDOL 2 MG: 2 TABLET ORAL at 21:53

## 2018-08-28 RX ADMIN — LURASIDONE HYDROCHLORIDE 40 MG: 40 TABLET, FILM COATED ORAL at 17:05

## 2018-08-28 RX ADMIN — ZIPRASIDONE HCL 80 MG: 40 CAPSULE ORAL at 17:47

## 2018-08-28 RX ADMIN — TOPIRAMATE 75 MG: 25 TABLET, FILM COATED ORAL at 21:53

## 2018-08-28 NOTE — PLAN OF CARE
Problem: General Plan of Care (Inpatient Behavioral)  Goal: Discharge Planning  Patient attended Process Group on 8/27 and 8/28/18. Participation complete and appropriate.

## 2018-08-28 NOTE — PROGRESS NOTES
[facility] Psychiatric Progress Note      Interval History:   Pt seen, chart reviewed, reviewed case with nursing. Pt was seen on 8/28/18 by Dr. Gee. Pt states that she is doing okay today. Pt slept well, reporting no voices or nightmares. Dr. Gee will up the dosage of Latuda from 20mg to 40mg and overlap it with her current medication.     Pt continues to have delusional thoughts and magical thinking, for example when pt was asked why she did not complete her last few credits for her college degree, pt stated that she was worried about her niece and niece's relationship with a person the opt did not like. Pt was worried that if she finished her degree, her niece would end up with this person. When challenged about the link between her degree and the relationship with this person being psychotic, pt was not convinced, demonstrating she has poor insight. Pt was also concerned about the child protection report because she does  on call for a living, pt is worried that she will lose her job.      Review of systems:   10 point Review of Systems conducted by Dr Milner is negative, other than noted in the HPI     Medications:       haloperidol  5 mg Oral At Bedtime     lurasidone  20 mg Oral Daily     ranitidine  150 mg Oral At Bedtime     topiramate  75 mg Oral At Bedtime     ziprasidone  80 mg Oral At Bedtime     albuterol, cetirizine, clonazePAM (klonoPIN) tablet 0.5 mg, traZODone    Mental Status Examination:       Appearance Sitting in chair, dressed in hospital scrubs. Appears stated age. Awake, alert   Attitude Cooperative    Orientation Oriented to person, place and time   Eye Contact Normal   Speech Fast pace but nolonger pressured   Language Normal   Psychomotor Behavior No evidence of tardive dyskinesia, dystonia or tics   Mood Much improved   Affect Flat   Thought Process Disorganization has resolved and tangential thoughts have resolved. Not rambling. Pt did not have  loose associations.   Associations Intact, Oriented to time, place and person   Thought Content Not grandiose, still delusional, not paranoid, less tangential   Fund of Knowledge  Moderate   Insight Impaired   Judgement  Improving, increasingly cooperative    Attention Span & Concentration Intact   Recent & Remote Memory  intact   Gait Normal   Muscle Tone Normal             Labs/Vitals:   No results found for this or any previous visit (from the past 24 hour(s)).  B/P: 115/86, T: 97.7, P: 90, R: 16  IMPRESSION:  Schizoaffective disorder, bipolar type.       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  She has history of schizoaffective disorder, bipolar type, as well as delusional disorder and possible borderline personality disorder, who was admitted acutely decompensated from outpatient clinic yesterday where she demonstrated substantial paranoia and thought disorganization. Pt is responding favorable to the addition of Latuda and it's overlap with Geodon. We will lower Haldol from 5mg to 2mg bedtime.         Plan:       1.  Continue hospitalization at [facility].   2.  Increase Latuda to 40mg PO q6pm with food  3.  Decrease Haldol from 5mg to 2mg  4.  Continue Geodon 40mg twice a day    Attestation:  Patient has been seen and evaluated by me,  Harshad Gee MD

## 2018-08-28 NOTE — PLAN OF CARE
Problem: Psychotic Symptoms  Goal: Psychotic Symptoms  1.Signs and symptoms of listed problems will be absent or manageable.   2. Pt will verbalized their emotions  3. Pt will aheld to medication regiment.  4. Will attend groups to learn coping skills   Outcome: Improving  Remains disorganized in thought process.  Appears internally preoccupied at times, but denies any hallucinations.  States that she wants a new MD at [facility] & is requesting Dr. Montemayor.  Patient was more active in the milieu & involved in activities.  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

## 2018-08-28 NOTE — PLAN OF CARE
Problem: Psychotic Symptoms  Goal: Psychotic Symptoms  1.Signs and symptoms of listed problems will be absent or manageable.   2. Pt will verbalized their emotions  3. Pt will aheld to medication regiment.  4. Will attend groups to learn coping skills        Pt presents with a blunt affect and calm mood.  Pt attended groups and participated but thought process seemed disorganized and tangential.  Pt still seems somewhat internally preoccupied.  Pt spoke about wanting to discharge soon so that she can go to work. Med compliant.

## 2018-08-29 ENCOUNTER — HOSPITAL ENCOUNTER (EMERGENCY)
Facility: CLINIC | Age: 33
Discharge: HOME | End: 2018-08-29
Attending: EMERGENCY MEDICINE | Admitting: EMERGENCY MEDICINE
Payer: MEDICAID

## 2018-08-29 VITALS
OXYGEN SATURATION: 97 % | TEMPERATURE: 97.4 F | RESPIRATION RATE: 16 BRPM | WEIGHT: 199.5 LBS | DIASTOLIC BLOOD PRESSURE: 83 MMHG | BODY MASS INDEX: 35.35 KG/M2 | HEART RATE: 85 BPM | SYSTOLIC BLOOD PRESSURE: 130 MMHG | HEIGHT: 63 IN

## 2018-08-29 VITALS
SYSTOLIC BLOOD PRESSURE: 133 MMHG | RESPIRATION RATE: 18 BRPM | BODY MASS INDEX: 35.44 KG/M2 | HEIGHT: 63 IN | WEIGHT: 200 LBS | TEMPERATURE: 98.4 F | DIASTOLIC BLOOD PRESSURE: 95 MMHG | OXYGEN SATURATION: 100 % | HEART RATE: 87 BPM

## 2018-08-29 DIAGNOSIS — Z76.0 ENCOUNTER FOR MEDICATION REFILL: ICD-10-CM

## 2018-08-29 PROCEDURE — 90853 GROUP PSYCHOTHERAPY: CPT

## 2018-08-29 PROCEDURE — 99282 EMERGENCY DEPT VISIT SF MDM: CPT

## 2018-08-29 RX ORDER — ZIPRASIDONE HYDROCHLORIDE 80 MG/1
80 CAPSULE ORAL AT BEDTIME
Qty: 30 CAPSULE | Refills: 0 | Status: SHIPPED | OUTPATIENT
Start: 2018-08-29 | End: 2019-01-10

## 2018-08-29 RX ORDER — TOPIRAMATE 25 MG/1
75 TABLET, FILM COATED ORAL
Qty: 180 TABLET | Refills: 0 | Status: SHIPPED | OUTPATIENT
Start: 2018-08-29 | End: 2018-10-15

## 2018-08-29 RX ORDER — LURASIDONE HYDROCHLORIDE 60 MG/1
60 TABLET, FILM COATED ORAL DAILY
Status: DISCONTINUED | OUTPATIENT
Start: 2018-08-29 | End: 2018-08-29 | Stop reason: HOSPADM

## 2018-08-29 RX ORDER — LURASIDONE HYDROCHLORIDE 60 MG/1
60 TABLET, FILM COATED ORAL DAILY
Status: DISCONTINUED | OUTPATIENT
Start: 2018-08-30 | End: 2018-08-29 | Stop reason: HOSPADM

## 2018-08-29 RX ORDER — TOPIRAMATE 25 MG/1
75 TABLET, FILM COATED ORAL EVERY 12 HOURS SCHEDULED
Status: DISCONTINUED | OUTPATIENT
Start: 2018-08-29 | End: 2018-08-29 | Stop reason: HOSPADM

## 2018-08-29 RX ORDER — LURASIDONE HYDROCHLORIDE 60 MG/1
60 TABLET, FILM COATED ORAL DAILY
Qty: 30 TABLET | Refills: 0 | Status: SHIPPED | OUTPATIENT
Start: 2018-08-29 | End: 2019-01-10

## 2018-08-29 RX ORDER — ZIPRASIDONE HYDROCHLORIDE 80 MG/1
80 CAPSULE ORAL AT BEDTIME
Status: DISCONTINUED | OUTPATIENT
Start: 2018-08-29 | End: 2018-08-29 | Stop reason: HOSPADM

## 2018-08-29 NOTE — DISCHARGE SUMMARY
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  She has history of schizoaffective disorder, bipolar type, as well as delusional disorder and possible borderline personality disorder, who was admitted acutely decompensated from outpatient clinic yesterday where she demonstrated substantial paranoia and thought disorganization. For history, see dictation by Dr. Gee on 8/29/18.     HOSPITAL COURSE:      Pt states that she believes that she cannot take Geodon at it's current 80mg dosage due to its reported side-effects. Latuda will substitute pt's current medication Haldol 5mg. Dr. Gee will also taper of the Geodon 80mg.    Pt continues to do well on the medication, reporting good sleep and no nightmares. Latuda dosage upped from 20mg to 40mg and overlapped with her current medication.     Pt continue to do remarkably better and states that once discharged she has an appointment scheduled with Dr. Consuelo KIM at [facility] on Thursday August, 30. Dr. Gee again recommends pt to see Dr. Milner after being discharged. Latuda was also increased from 40mg to 60mg today.     DISCHARGE MENTAL STATUS EXAMINATION:    Mental Status Examination:         Appearance Sitting in chair, dressed in normal clothing. Appears stated age. Awake, alert   Attitude Cooperative    Orientation Oriented to person, place and time   Eye Contact Normal   Speech Fast pace but nolonger pressured   Language Normal   Psychomotor Behavior No evidence of tardive dyskinesia, dystonia or tics   Mood Much improved   Affect Flat   Thought Process Disorganization has resolved and tangential thoughts have resolved. Not rambling. Pt did not have loose associations.  "  Associations Intact, Oriented to time, place and person   Thought Content Not grandiose, still delusional, not paranoid, less tangential   Fund of Knowledge  Moderate   Insight Intact   Judgement  Improving, increasingly cooperative    Attention Span & Concentration Intact   Recent & Remote Memory  intact   Gait Normal   Muscle Tone Normal          LABORATORY DATA:    Refer to hospitalist admission dictation.  No results found for this or any previous visit (from the past 24 hour(s)).     /83  Pulse 85  Temp 97.4  F (36.3  C) (Oral)  Resp 16  Ht 1.6 m (5' 3\")  Wt 90.5 kg (199 lb 8 oz)  SpO2 97%  BMI 35.34 kg/m2     DISCHARGE MEDICATIONS:      Review of your medicines      START taking       Dose / Directions    lurasidone 60 MG Tabs tablet   Commonly known as:  LATUDA        Dose:  60 mg   Take 1 tablet (60 mg) by mouth daily   Quantity:  30 tablet   Refills:  0       topiramate 25 MG tablet   Commonly known as:  TOPAMAX   Used for:  Schizoaffective disorder, bipolar type (H)        Dose:  75 mg   Take 3 tablets (75 mg) by mouth 2 times daily   Quantity:  180 tablet   Refills:  0       ziprasidone 80 MG capsule   Commonly known as:  GEODON        Dose:  80 mg   Take 1 capsule (80 mg) by mouth At Bedtime   Quantity:  30 capsule   Refills:  0         CONTINUE these medicines which have NOT CHANGED       Dose / Directions    cetirizine 10 MG tablet   Commonly known as:  zyrTEC        Dose:  10 mg   Take 10 mg by mouth 2 times daily as needed for allergies   Refills:  0       EPINEPHrine 0.3 MG/0.3ML injection 2-pack   Commonly known as:  EPIPEN 2-ODETTE   Used for:  Need for desensitization to allergens        Dose:  0.3 mg   Inject 0.3 mLs (0.3 mg) into the muscle once as needed for anaphylaxis   Quantity:  0.6 mL   Refills:  3       KLONOPIN PO        Dose:  1 mg   Take 1 mg by mouth daily as needed for anxiety   Refills:  0       levonorgestrel 20 MCG/24HR IUD   Commonly known as:  MIRENA   Used for:  " Encounter for insertion of mirena IUD        Dose:  1 each   1 each (20 mcg) by Intrauterine route continuous   Refills:  0       * ORDER FOR ALLERGEN IMMUNOTHERAPY 5 mL vial   Used for:  Allergic rhinitis due to animal dander, Allergic rhinitis due to dust mite        Cat Hair, Standardized 10,000 BAU/mL, ALK  3.0 ml Dog Hair Dander, A. P.  1:100 w/v, HS  1.0 ml Dust Mites F 30,000AU/mL, HS  0.5 ml Dust Mites P. 30,000 AU/mL, HS  0.5 ml  Diluent: HSA qs to 5ml   Quantity:  5 mL   Refills:  PRN       * ORDER FOR ALLERGEN IMMUNOTHERAPY 5 mL vial   Used for:  Allergic rhinitis due to mold        Alternaria Tenuis 1:10 w/v, HS  0.5 ml Epicoccum Nigrum 1:10 w/v, HS 0.5 ml Hormodendrum Cladosporioides 1:10 w/v, HS 0.5 ml Diluent: HSA qs to 5ml   Quantity:  5 mL   Refills:  PRN       * ORDER FOR ALLERGEN IMMUNOTHERAPY 5 mL vial   Used for:  Chronic seasonal allergic rhinitis due to pollen        Estuardo, White  1:20 w/v, HS  0.5 ml Birch Mix PRW 1:20 w/v, HS  0.5 ml Elm, American 1:20 w/v, HS  0.5 ml Hackberry 1:20 w/v, HS 0.5 ml Hickory, Shagbark 1:20 w/v, HS  0.5 ml Aurora Mix RW 1:20 w/v, HS 0.5 ml Oak Mix RVW 1:20 w/v, HS 0.5 ml Miami Tree, Black 1:20 w/v, HS 0.5 ml [place], Black 1:20 w/v, HS 0.5 ml Diluent: HSA qs to 5ml   Quantity:  5 mL   Refills:  PRN       * ORDER FOR ALLERGEN IMMUNOTHERAPY 5 mL vial   Used for:  Chronic seasonal allergic rhinitis due to pollen        Kochia 1:20 w/v, HS 1.0 ml Nettle 1:20 w/v, HS 1.0 ml Plantain, English 1:20 w/v, HS 1.0 ml Ragweed Mixed 1:20 w/v ALK  0.6 ml Russian Thistle 1:20 w/v, HS 1.0 ml Diluent: HSA qs to 5ml   Quantity:  5 mL   Refills:  PRN       ranitidine 150 MG tablet   Commonly known as:  ZANTAC        Dose:  150 mg   Take 150 mg by mouth At Bedtime   Refills:  0       traZODone 50 MG tablet   Commonly known as:  DESYREL   Used for:  Insomnia, unspecified type        Dose:  50 mg   Take 50 mg by mouth nightly as needed for sleep   Quantity:  30 tablet   Refills:  3        * Notice:  This list has 4 medication(s) that are the same as other medications prescribed for you. Read the directions carefully, and ask your doctor or other care provider to review them with you.      STOP taking          haloperidol 1 MG tablet   Commonly known as:  HALDOL                Where to get your medicines      These medications were sent to [id number] - [place] - [address] & [facility]  [address] [address]     Phone:  [phone]      lurasidone 60 MG Tabs tablet     topiramate 25 MG tablet     ziprasidone 80 MG capsule             DISCHARGE DIAGNOSES:    1. Schizoaffective disorder, bipolar type.     DISCHARGE PLAN:        1.  Continue hospitalization at [facility].   2.  Increase Latuda to 60mg PO q6pm with food  3.  Continue Geodon 40mg twice a day       DISCHARGE FOLLOW-UP:    [facility]  [address] [place]  [phone] / Fax [phone]     You have a therapy appointment scheduled with Consuelo at [facility] in [place] on Thursday, August 30, 2018 at 11:00 am.      [address]  [place]  [phone] / Fax [phone]        Attestation:   Patient has been seen and evaluated by me, Harshad Gee MD.    Patient ID:    Name: Maddy Ortiz MRN: [id number]  Admission: 8/24/2018  YOB: 1985

## 2018-08-29 NOTE — PROGRESS NOTES
met with patient to discuss her safety plan with her.  answered all questions that she had. Patient was pleasant and cooperative and acknowledged understanding of the care plan. Patient denies any thoughts of suicide or self harm at this time.

## 2018-08-29 NOTE — ED AVS SNAPSHOT
Emergency Department    [address] [address]    Phone:  [phone]    Fax:  [phone]                                       Maddy Ortiz   MRN: [id number]    Department:   Emergency Department   Date of Visit:  8/29/2018           Patient Information     Date Of Birth          1985        Your diagnoses for this visit were:     Encounter for medication refill        You were seen by Maddy Haines MD.      Follow-up Information     Follow up with Shirley Freeman APRN CNP.    Specialty:  Nurse Practitioner    Contact information:    [address] [facility] [address]  [phone]          Follow up with  Emergency Department.    Specialty:  EMERGENCY MEDICINE    Why:  If symptoms worsen    Contact information:    [address] [facility] [id number] [phone]        Discharge Instructions       Call social work tomorrow to follow up with your insurance    Your next 10 appointments already scheduled     Aug 30, 2018  1:00 PM CDT   Nurse Only with ALLERGY [facility] ([facility])    [address] [facility] [address]   [phone]           Every allergy patient MUST wait 30 minutes after their allergy shot. No exceptions.  Xolair shots #1-3 should plan to wait 2 hours in clinic Xolair shots after #4 should plan 30 minute wait in clinic            Sep 12, 2018 10:45 AM CDT   Nurse Only with ALLERGY [facility] ([facility])    [address] [facility] [address]   [phone]           Every allergy patient MUST wait 30 minutes after their allergy shot. No exceptions.  Xolair shots #1-3 should plan to wait 2 hours in clinic Xolair shots after #4 should plan 30 minute wait in clinic            Oct 15, 2018 10:45 AM CDT   (Arrive by 10:30 AM)   Return Weight Management Visit with Maldonado Michele MD   [facility]  Weight Management ([facility])    [address]  [address] [facility] [address]   [phone]            Nov 05, 2018 11:00 AM CST   (Arrive by 10:45 AM)   New Patient Visit with Maldonado Michele MD   [facility] Weight Management ([facility])    [address]  [address] [facility] [address]   [phone]              24 Hour Appointment Hotline       To make an appointment at any [facility], call [phone]). If you don't have a family doctor or clinic, we will help you find one. [facility] are conveniently located to serve the needs of you and your family.             Review of your medicines      Our records show that you are taking the medicines listed below. If these are incorrect, please call your family doctor or clinic.        Dose / Directions Last dose taken    cetirizine 10 MG tablet   Commonly known as:  zyrTEC   Dose:  10 mg        Take 10 mg by mouth 2 times daily as needed for allergies   Refills:  0        EPINEPHrine 0.3 MG/0.3ML injection 2-pack   Commonly known as:  EPIPEN 2-ODETTE   Dose:  0.3 mg   Quantity:  0.6 mL        Inject 0.3 mLs (0.3 mg) into the muscle once as needed for anaphylaxis   Refills:  3        KLONOPIN PO   Dose:  1 mg        Take 1 mg by mouth daily as needed for anxiety   Refills:  0        levonorgestrel 20 MCG/24HR IUD   Commonly known as:  MIRENA   Dose:  1 each        1 each (20 mcg) by Intrauterine route continuous   Refills:  0        lurasidone 60 MG Tabs tablet   Commonly known as:  LATUDA   Dose:  60 mg   Quantity:  30 tablet        Take 1 tablet (60 mg) by mouth daily   Refills:  0        * ORDER FOR ALLERGEN IMMUNOTHERAPY 5 mL vial   Quantity:  5 mL        Cat Hair, Standardized 10,000 BAU/mL, ALK  3.0 ml Dog Hair Dander, A. P.  1:100 w/v, HS  1.0 ml Dust Mites F 30,000AU/mL, HS  0.5 ml Dust Mites P. 30,000 AU/mL, HS  0.5 ml  Diluent: HSA qs to  5ml   Refills:  PRN        * ORDER FOR ALLERGEN IMMUNOTHERAPY 5 mL vial   Quantity:  5 mL        Alternaria Tenuis 1:10 w/v, HS  0.5 ml Epicoccum Nigrum 1:10 w/v, HS 0.5 ml Hormodendrum Cladosporioides 1:10 w/v, HS 0.5 ml Diluent: HSA qs to 5ml   Refills:  PRN        * ORDER FOR ALLERGEN IMMUNOTHERAPY 5 mL vial   Quantity:  5 mL        Estuardo, White  1:20 w/v, HS  0.5 ml Birch Mix PRW 1:20 w/v, HS  0.5 ml Elm, American 1:20 w/v, HS  0.5 ml Hackberry 1:20 w/v, HS 0.5 ml Hickory, Shagbark 1:20 w/v, HS  0.5 ml Kamiah Mix RW 1:20 w/v, HS 0.5 ml Oak Mix RVW 1:20 w/v, HS 0.5 ml Dalhart Tree, Black 1:20 w/v, HS 0.5 ml Akutan, Black 1:20 w/v, HS 0.5 ml Diluent: HSA qs to 5ml   Refills:  PRN        * ORDER FOR ALLERGEN IMMUNOTHERAPY 5 mL vial   Quantity:  5 mL        Kochia 1:20 w/v, HS 1.0 ml Nettle 1:20 w/v, HS 1.0 ml Plantain, English 1:20 w/v, HS 1.0 ml Ragweed Mixed 1:20 w/v ALK  0.6 ml Russian Thistle 1:20 w/v, HS 1.0 ml Diluent: HSA qs to 5ml   Refills:  PRN        ranitidine 150 MG tablet   Commonly known as:  ZANTAC   Dose:  150 mg        Take 150 mg by mouth At Bedtime   Refills:  0        topiramate 25 MG tablet   Commonly known as:  TOPAMAX   Dose:  75 mg   Quantity:  180 tablet        Take 3 tablets (75 mg) by mouth 2 times daily   Refills:  0        traZODone 50 MG tablet   Commonly known as:  DESYREL   Dose:  50 mg   Quantity:  30 tablet        Take 50 mg by mouth nightly as needed for sleep   Refills:  3        ziprasidone 80 MG capsule   Commonly known as:  GEODON   Dose:  80 mg   Quantity:  30 capsule        Take 1 capsule (80 mg) by mouth At Bedtime   Refills:  0        * Notice:  This list has 4 medication(s) that are the same as other medications prescribed for you. Read the directions carefully, and ask your doctor or other care provider to review them with you.            Orders Needing Specimen Collection     None      Pending Results     No orders found from 8/27/2018 to 8/30/2018.            Pending  Culture Results     No orders found from 8/27/2018 to 8/30/2018.            Pending Results Instructions     If you had any lab results that were not finalized at the time of your Discharge, you can call the ED Lab Result RN at [phone]. You will be contacted by this team for any positive Lab results or changes in treatment. The nurses are available 7 days a week from 10A to 6:30P.  You can leave a message 24 hours per day and they will return your call.        Test Results From Your Hospital Stay               Clinical Quality Measure: Blood Pressure Screening     Your blood pressure was checked while you were in the emergency department today. The last reading we obtained was  BP: (!) 133/95 . Please read the guidelines below about what these numbers mean and what you should do about them.  If your systolic blood pressure (the top number) is less than 120 and your diastolic blood pressure (the bottom number) is less than 80, then your blood pressure is normal. There is nothing more that you need to do about it.  If your systolic blood pressure (the top number) is 120-139 or your diastolic blood pressure (the bottom number) is 80-89, your blood pressure may be higher than it should be. You should have your blood pressure rechecked within a year by a primary care provider.  If your systolic blood pressure (the top number) is 140 or greater or your diastolic blood pressure (the bottom number) is 90 or greater, you may have high blood pressure. High blood pressure is treatable, but if left untreated over time it can put you at risk for heart attack, stroke, or kidney failure. You should have your blood pressure rechecked by a primary care provider within the next 4 weeks.  If your provider in the emergency department today gave you specific instructions to follow-up with your doctor or provider even sooner than that, you should follow that instruction and not wait for up to 4 weeks for your follow-up visit.         Thank you for choosing [facility]       Thank you for choosing [facility] for your care. Our goal is always to provide you with excellent care. Hearing back from our patients is one way we can continue to improve our services. Please take a few minutes to complete the written survey that you may receive in the mail after you visit with us. Thank you!        Care EveryWhere ID     This is your Care EveryWhere ID. This could be used by other organizations to access your [facility] medical records  [id number]        Equal Access to Services     ARMOND MCKEON : Hadii linda robbinso Soradha, waaxda luqadaha, qaybta kaalmada adecarmelitayatruong, lincoln maciel . So [facility] [phone].    ATENCIÓN: Si habla español, tiene a titus disposición servicios gratuitos de asistencia lingüística. [facility] [phone].    We comply with applicable federal civil rights laws and [place] laws. We do not discriminate on the basis of race, color, national origin, age, disability, sex, sexual orientation, or gender identity.            After Visit Summary       This is your record. Keep this with you and show to your community pharmacist(s) and doctor(s) at your next visit.

## 2018-08-29 NOTE — PROGRESS NOTES
Discharge teaching done. Pt denied SI and denied HI.  Pt verbalized understanding of medication and out pt f/u TX plan.   Medications are being filled at pt's own pharmacy. Belongings returned to pt at discharge. Pt's father arrived to the unit to provide pt with transportation home. Pt dischrged to home.

## 2018-08-29 NOTE — PROGRESS NOTES
[facility] Psychiatric Progress Note      Interval History:   Pt seen, chart reviewed, reviewed case with nursing. Pt was seen on 8/29/18 by Dr. Gee. Pt is feeling better today. Pt states once discharged she has an appointment scheduled with Dr. Consuelo KIM at [facility] on Thursday August, 30. Dr. Gee again recommends pt to see Dr. Milner after being discharged.  will icnrease Latuda from 40mg to 60mg today.      Review of systems:   10 point Review of Systems conducted by Dr Milner is negative, other than noted in the HPI     Medications:       haloperidol  2 mg Oral At Bedtime     lurasidone  40 mg Oral Daily     ranitidine  150 mg Oral At Bedtime     topiramate  75 mg Oral At Bedtime     ziprasidone  80 mg Oral At Bedtime     albuterol, cetirizine, clonazePAM (klonoPIN) tablet 0.5 mg, traZODone    Mental Status Examination:       Appearance Sitting in chair, dressed in normal clothing. Appears stated age. Awake, alert   Attitude Cooperative    Orientation Oriented to person, place and time   Eye Contact Normal   Speech Fast pace but nolonger pressured   Language Normal   Psychomotor Behavior No evidence of tardive dyskinesia, dystonia or tics   Mood Much improved   Affect Flat   Thought Process Disorganization has resolved and tangential thoughts have resolved. Not rambling. Pt did not have loose associations.   Associations Intact, Oriented to time, place and person   Thought Content Not grandiose, still delusional, not paranoid, less tangential   Fund of Knowledge  Moderate   Insight Intact   Judgement  Improving, increasingly cooperative    Attention Span & Concentration Intact   Recent & Remote Memory  intact   Gait Normal   Muscle Tone Normal             Labs/Vitals:   No results found for this or any previous visit (from the past 24 hour(s)).  B/P: 115/86, T: 97.7, P: 90, R: 16  IMPRESSION:  Schizoaffective disorder, bipolar type.       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  She has history of schizoaffective disorder, bipolar type, as well as delusional disorder and possible borderline personality disorder, who was admitted acutely decompensated from outpatient clinic yesterday where she demonstrated substantial paranoia and thought disorganization. Pt is responding favorable to the addition of Latuda and it's overlap with Geodon. We will increase Latuda dosage from 40mg to 60mg. Pt will be discharged today.          Plan:       1.  Continue hospitalization at [facility].   2.  Increase Latuda to 60mg PO q6pm with food  3.  Continue Geodon 40mg twice a day    Attestation:  Patient has been seen and evaluated by me,  Harshad Gee MD

## 2018-08-29 NOTE — PLAN OF CARE
Problem: Psychotic Symptoms  Goal: Psychotic Symptoms  1.Signs and symptoms of listed problems will be absent or manageable.   2. Pt will verbalized their emotions  3. Pt will aheld to medication regiment.  4. Will attend groups to learn coping skills   Outcome: No Change  Patient presents with a blunt affect and calm mood. Pt is visible on the unit seen in the halls socializing with staff and peers. Later in the evening pt appears tense and disorganized. Pt kept peering into the ITC  and requested to go back to the ITC to visit with peers. Pt requested Geodon medication to be taken with meals instead of bedtime. Pt refused to attend groups when prompted by staff. Pt retreated to her room and spent the duration of the shift asleep. Pt is med complaint and denies SI.

## 2018-08-29 NOTE — PROGRESS NOTES
"Patient discharged this afternoon.  She called back around 5:45 because she went to her Pharmacy in [facility] and   They will not fill her medications because her insurance did not go through..  I verified information with the pharmacy and they told me that her insurance is not active.  Maddy tells me she is really afraid of not taking her medications because she does not want to be neglectful by not taking her medications and getting her self in trouble.  Stated \" My medicaid did not work, I feel like those people should be building, buildings instead of making rules\"  , she said,  I am going back to the ER because  \"  I need my medications\".   This note was created at 1800.    "

## 2018-08-29 NOTE — ED AVS SNAPSHOT
Emergency Department    [address] [facility] [address]    Phone:  [phone]    Fax:  [phone]                                       Maddy Ortiz   MRN: [id number]    Department:   Emergency Department   Date of Visit:  8/29/2018           After Visit Summary Signature Page     I have received my discharge instructions, and my questions have been answered. I have discussed any challenges I see with this plan with the nurse or doctor.    ..........................................................................................................................................  Patient/Patient Representative Signature      ..........................................................................................................................................  Patient Representative Print Name and Relationship to Patient    ..................................................               ................................................  Date                                            Time    ..........................................................................................................................................  Reviewed by Signature/Title    ...................................................              ..............................................  Date                                                            Time          22EPIC Rev 08/18

## 2018-08-29 NOTE — DISCHARGE INSTRUCTIONS
Behavioral Discharge Planning and Instructions    Summary:  Admitted for psychosis.     Main Diagnosis:   Schizoaffective Disorder, bipolar type.     Major Treatments, Procedures and Findings:  Psychiatric assessment. Medication adjustment.     Symptoms to Report: Feeling more agitated, Increased confusion or psychosis, Losing more sleep, Mood getting worse or Thoughts of suicide    Lifestyle Adjustment:  Follow all treatment recommendations. Develop and follow safety plan.     Psychiatry Follow-up:     We have called and left a message with scheduling at [facility]. If we are unable to get an appointment set up prior to discharge, please call and schedule a post hospital follow up visit with Dr. Krishna Milner at [facility] in two to four weeks so that you can get your medications refilled.     [facility]  [address] [place]  [phone] / Fax [phone]    You have a therapy appointment scheduled with Consuelo at [facility] on Thursday, August 30, 2018 at 11:00 am.     [address]  [phone] / Fax [phone]     A report was made to Child Protective Services by your physician upon admission. If you have any questions, please contact Child Protective Services directly.     [facility]   [phone] or [phone] (after hours and on weekends)    [facility]   [phone]    Resources:   Crisis Intervention: 382.425.4020 or 352-419-8776 (TTY: 348.648.6274).  Call anytime for help.  National [place] on Mental Illness (www.mn.vincent.org): 494.836.2015 or 905-029-1154.  National Suicide Prevention Line (www.mentalhealthmn.org): 713-003-STVD (8570)  If you or someone you know is experiencing a mental health crisis, intervention services are available for [facility] residents through [facility] by calling [phone]. This phone line is staffed 24 hours a day, seven  days a week.    General Medication Instructions:   See your medication sheet(s) for instructions.   Take all medicines as directed.  Make no changes unless your doctor suggests them.   Go to all your doctor visits.  Be sure to have all your required lab tests. This way, your medicines can be refilled on time.  Do not use any drugs not prescribed by your doctor.  Avoid alcohol.

## 2018-08-30 ENCOUNTER — PATIENT OUTREACH (OUTPATIENT)
Dept: CARE COORDINATION | Facility: CLINIC | Age: 33
End: 2018-08-30

## 2018-08-30 ENCOUNTER — ALLIED HEALTH/NURSE VISIT (OUTPATIENT)
Dept: ALLERGY | Facility: CLINIC | Age: 33
End: 2018-08-30
Payer: MEDICAID

## 2018-08-30 DIAGNOSIS — Z53.9 ERRONEOUS ENCOUNTER--DISREGARD: Primary | ICD-10-CM

## 2018-08-30 NOTE — LETTER
[facility] Home  Complex Care Plan  About Me  Patient Name:  Maddy Ortiz    YOB: 1985  Age:     32 year old   [place] MRN:   [id number] Telephone Information:    Home Phone [phone]   Mobile [phone]       Address:    [address]  [facility] [address] Email address:  No e-mail address on record      Emergency Contact(s)  Name Relationship Lgl Grd Work Phone Home Phone Mobile Phone   1. RAMY ORTIZ * Mother  [phone]   2. KASHIF DIAZ Friend  none [phone]           Primary language:  English     needed? No   [facility]:  [phone] op. 1  Other communication barriers: Lack of coping  Preferred Method of Communication:  Mail  Current living arrangement: I live in a private home with family (with 10 y.o dtre, Dtr currenlty staying with grandparents CPS involved.)  Mobility Status/ Medical Equipment: Independent    Health Maintenance  Health Maintenance Reviewed:      My Access Plan  Medical Emergency 911   Primary Clinic Line [facility] - [phone]   24 Hour Appointment Line [phone] or  [phone] ([phone]) (toll-free)   24 Hour Nurse Line [phone] (toll-free)   Preferred Urgent Care [facility], [phone]   Preferred Hospital [facility]  [phone]   Preferred Pharmacy [facility] - [place], [place] - [address] [place] [address]     Behavioral Health Crisis Line The National Suicide Prevention Lifeline at 1-153.417.8703 or 911     My Care Team Members    Care Team Members      Relationship Specialty Notifications Start End    Shirley Freeman APRN CNP PCP - General Nurse Practitioner  5/24/18     Phone: [phone] Fax: [phone] [address] [facility] [address]    Cayla Winchester Psychologist Psychology  11/21/13     Comment:  [facility]- TIMOTHY signed 11/20/13-    Email- [email or web address]    Phone: [phone] Fax: [phone]         [email or web address] [address] [facility] [address]    Callie Welsh ARMHS worker   11/21/13     Temi PONCE    11/21/13     Comment:  [facility]  (TIMOTHY signed 11/20/13)    Phone: [phone]         Wallace Calabrese MD MD Psychiatry  11/21/13     Comment:  [facility] [place] (TIMOTHY signed 11/20/13)    Phone: [phone] Fax: [phone]         [address] [facility] [address]    Deni Fernandez MD MD Family Practice  5/4/18     Phone: [phone] Fax: [phone] [address] [facility] [address]    Aggie Lehman MD MD INTERNAL MEDICINE - ENDOCRINOLOGY, DIABETES & METABOLISM  5/4/18     Phone: [phone] Fax: [phone]         [address] [facility] [address] [facility] [address]    Padmini Beatty LSW Lead Care Coordinator Primary Care - CC  8/30/18     Comment:  [facility] (Peggy Johnson)     Phone: [phone] Fax: [phone]                My Care Plans  Self Management and Treatment Plan  Goals and (Comments)  Goals        General    Medication 1 (pt-stated)     Notes - Note created  8/30/2018  4:26 PM by Padmini Beatty LSW    Goal Statement: I need to get my medication.  Measure of Success: will have medication  Supportive Steps to Achieve: Verified MA is active  Barriers:   Strengths: pt called ins, will go to pharmacy to get meds  Date to Achieve By: 9/1/18  Patient expressed understanding of goal: fair        Mental Health Management (pt-stated)     Notes - Note created  8/31/2018  9:12 AM by Padmini Beatty LSW    Goal Statement: I will follow up with my therapist and Psychiatrist  Measure of Success: will keep scheduled appts  Supportive Steps to Achieve: has appts  Barriers:   Strengths: has established supports   Date to Achieve By:Sept 15th.   Patient expressed understanding of  goal: yes               Action Plans on File:    Depression       Advance Care Plans/Directives Type:        My Medical and Care Information  Problem List   Patient Active Problem List   Diagnosis     Animal dander allergy     CARDIOVASCULAR SCREENING; LDL GOAL LESS THAN 160     Psychosis     Paranoid type delusional disorder (H)     Bipolar 1 disorder (H)     Insomnia     Depression with anxiety     Seasonal allergic rhinitis due to pollen     Allergic rhinitis due to mold     Allergic rhinitis due to animal dander     Allergic rhinitis due to dust mite     Encounter for IUD insertion     Schizoaffective disorder, bipolar type (H)     Gastroesophageal reflux disease without esophagitis     Paranoia (psychosis) (H)      Current Medications and Allergies:  See printed Medication Report.    Care Coordination Start Date: 8/30/2018   Frequency of Care Coordination: weekly   Form Last Updated: 08/31/2018

## 2018-08-30 NOTE — MR AVS SNAPSHOT
After Visit Summary   8/30/2018    Maddy Ortiz    MRN: [id number]           Patient Information     Date Of Birth          1985        Visit Information        Provider Department      8/30/2018 1:00 PM ALLERGY [facility]        Today's Diagnoses     ERRONEOUS ENCOUNTER--DISREGARD    -  1       Follow-ups after your visit        Your next 10 appointments already scheduled     Aug 31, 2018  3:00 PM CDT   Nurse Only with ALLERGY [facility] ([facility])    [address] [facility] [address]   [phone]           Every allergy patient MUST wait 30 minutes after their allergy shot. No exceptions.  Xolair shots #1-3 should plan to wait 2 hours in clinic Xolair shots after #4 should plan 30 minute wait in clinic            Sep 12, 2018 10:45 AM CDT   Nurse Only with ALLERGY [facility] ([facility])    [address] [facility] [address]   [phone]           Every allergy patient MUST wait 30 minutes after their allergy shot. No exceptions.  Xolair shots #1-3 should plan to wait 2 hours in clinic Xolair shots after #4 should plan 30 minute wait in clinic            Oct 15, 2018 10:45 AM CDT   (Arrive by 10:30 AM)   Return Weight Management Visit with Maldonado Michele MD   [facility] ([facility])    [address] [place] [address]   [phone]            Nov 05, 2018 11:00 AM CST   (Arrive by 10:45 AM)   New Patient Visit with Maldonado Michele MD   [facility] ([facility])    [address] [place] [id number] [phone]              Who to contact     If you have questions or need follow up information about today's clinic visit or your schedule please contact [facility] directly at  [phone].  Normal or non-critical lab and imaging results will be communicated to you by MyChart, letter or phone within 4 business days after the clinic has received the results. If you do not hear from us within 7 days, please contact the clinic through MyChart or phone. If you have a critical or abnormal lab result, we will notify you by phone as soon as possible.  Submit refill requests through MyChart or call your pharmacy and they will forward the refill request to us. Please allow 3 business days for your refill to be completed.          Additional Information About Your Visit        Care EveryWhere ID     This is your Care EveryWhere ID. This could be used by other organizations to access your [facility] medical records  [id number]         Blood Pressure from Last 3 Encounters:   08/29/18 (!) 133/95   08/29/18 130/83   07/30/18 102/80    Weight from Last 3 Encounters:   08/29/18 90.7 kg (200 lb)   08/28/18 90.5 kg (199 lb 8 oz)   07/30/18 92.6 kg (204 lb 3.2 oz)              Today, you had the following     No orders found for display       Primary Care Provider Office Phone # Fax #    Shirley Andrade PHILL [facility] [phone] [address] [facility] [address]        Goals        General    Medication 1 (pt-stated)     Notes - Note created  8/30/2018  4:26 PM by Padmini Beatty LSW    Goal Statement: I need to get my medication.  Measure of Success: will have medication  Supportive Steps to Achieve: Verified MA is active  Barriers:   Strengths: pt called ins, will go to pharmacy to get meds  Date to Achieve By: 9/1/18  Patient expressed understanding of goal: fair          Equal Access to Services     [facility] MIKE AH: Hadii linda Ackerman, waaxda luqadaha, qaybta kaalmada adeegyada, [place] fernandez. So [facility] [phone].    ATENCIÓN: Si habla español, tiene a titus disposición servicios gratuitos de asistencia lingüística. Llame al  [phone].    We comply with applicable federal civil rights laws and [place] laws. We do not discriminate on the basis of race, color, national origin, age, disability, sex, sexual orientation, or gender identity.            Thank you!     Thank you for choosing [facility]  for your care. Our goal is always to provide you with excellent care. Hearing back from our patients is one way we can continue to improve our services. Please take a few minutes to complete the written survey that you may receive in the mail after your visit with us. Thank you!             Your Updated Medication List - Protect others around you: Learn how to safely use, store and throw away your medicines at www.disposemymeds.org.          This list is accurate as of 8/30/18  4:34 PM.  Always use your most recent med list.                   Brand Name Dispense Instructions for use Diagnosis    cetirizine 10 MG tablet    zyrTEC     Take 10 mg by mouth 2 times daily as needed for allergies        EPINEPHrine 0.3 MG/0.3ML injection 2-pack    EPIPEN 2-ODETTE    0.6 mL    Inject 0.3 mLs (0.3 mg) into the muscle once as needed for anaphylaxis    Need for desensitization to allergens       KLONOPIN PO      Take 1 mg by mouth daily as needed for anxiety        levonorgestrel 20 MCG/24HR IUD    MIRENA     1 each (20 mcg) by Intrauterine route continuous    Encounter for insertion of mirena IUD       lurasidone 60 MG Tabs tablet    LATUDA    30 tablet    Take 1 tablet (60 mg) by mouth daily    Paranoia (psychosis) (H)       * ORDER FOR ALLERGEN IMMUNOTHERAPY 5 mL vial     5 mL    Cat Hair, Standardized 10,000 BAU/mL, ALK  3.0 ml Dog Hair Dander, A. P.  1:100 w/v, HS  1.0 ml Dust Mites F 30,000AU/mL, HS  0.5 ml Dust Mites P. 30,000 AU/mL, HS  0.5 ml  Diluent: HSA qs to 5ml    Allergic rhinitis due to animal dander, Allergic rhinitis due to dust mite       * ORDER FOR ALLERGEN IMMUNOTHERAPY 5 mL vial     5 mL    Alternaria Tenuis 1:10 w/v,  HS  0.5 ml Epicoccum Nigrum 1:10 w/v, HS 0.5 ml Hormodendrum Cladosporioides 1:10 w/v, HS 0.5 ml Diluent: HSA qs to 5ml    Allergic rhinitis due to mold       * ORDER FOR ALLERGEN IMMUNOTHERAPY 5 mL vial     5 mL    Estuardo, White  1:20 w/v, HS  0.5 ml Birch Mix PRW 1:20 w/v, HS  0.5 ml Elm, American 1:20 w/v, HS  0.5 ml Hackberry 1:20 w/v, HS 0.5 ml Hickory, Shagbark 1:20 w/v, HS  0.5 ml Liscomb Mix RW 1:20 w/v, HS 0.5 ml Oak Mix RVW 1:20 w/v, HS 0.5 ml [place] Tree, Black 1:20 w/v, HS 0.5 ml Cooter, Black 1:20 w/v, HS 0.5 ml Diluent: HSA qs to 5ml    Chronic seasonal allergic rhinitis due to pollen       * ORDER FOR ALLERGEN IMMUNOTHERAPY 5 mL vial     5 mL    Kochia 1:20 w/v, HS 1.0 ml Nettle 1:20 w/v, HS 1.0 ml Plantain, English 1:20 w/v, HS 1.0 ml Ragweed Mixed 1:20 w/v ALK  0.6 ml Russian Thistle 1:20 w/v, HS 1.0 ml Diluent: HSA qs to 5ml    Chronic seasonal allergic rhinitis due to pollen       ranitidine 150 MG tablet    ZANTAC     Take 150 mg by mouth At Bedtime        topiramate 25 MG tablet    TOPAMAX    180 tablet    Take 3 tablets (75 mg) by mouth 2 times daily    Schizoaffective disorder, bipolar type (H)       traZODone 50 MG tablet    DESYREL    30 tablet    Take 50 mg by mouth nightly as needed for sleep    Insomnia, unspecified type       ziprasidone 80 MG capsule    GEODON    30 capsule    Take 1 capsule (80 mg) by mouth At Bedtime    Paranoia (psychosis) (H)       * Notice:  This list has 4 medication(s) that are the same as other medications prescribed for you. Read the directions carefully, and ask your doctor or other care provider to review them with you.

## 2018-08-30 NOTE — LETTER
[place] CARE COORDINATION  [facility]   [address] [place] [address]  Phone: [phone]    August 31, 2018    Maddy Ortiz  [address]    [facility] [address]      Dear Maddy,    I am a clinic care coordinator who works with the providers  at the [facility]. I wanted to thank you for spending the time to talk with me yesterday.  I wanted to introduce myself and provide you with my contact information so that you can call me with questions or concerns about your health care. Below is a description of clinic care coordination and how I can further assist you.     The clinic care coordinator is a registered nurse and/or  who understand the health care system. The goal of clinic care coordination is to help you manage your health and improve access to the [facility] system in the most efficient manner. The registered nurse can assist you in meeting your health care goals by providing education, coordinating services, and strengthening the communication among your providers. The  can assist you with financial, behavioral, psychosocial, chemical dependency, counseling, and/or psychiatric resources.    You requested I call again today, which I did.  I did leave a message and hope to hear back from you.    Please remember to schedule an appointment at the clinic, as a follow up after your hospitalization.    I am enclosing a Depression Survival kit which you may find helpful. This includes Crisis Phone Numbers, if needed.    Please feel free to contact me at [phone], with any questions or concerns. We at [facility] are focused on providing you with the highest-quality healthcare experience possible and that all starts with you.     Sincerely,         Rosemary Beatty [facility]  , Clinic Care Coordination  Clinics:  Verena Juárez,  [place], [place], [place], [facility]   [phone]   8/31/2018    9:19 AM    Enclosed: I have enclosed a copy of the Complex Care Plan. This has helpful information and goals that we have talked about. Please keep this in an easy to access place to use as needed.

## 2018-08-30 NOTE — ED PROVIDER NOTES
"  History     Chief Complaint:  Medication Refill     HPI   Maddy Ortiz is a 32 year old female who presents to the emergency department for medication refill. The patient reports she was discharged from station 77 today and was prescribed 80 mg Geodon, 75 mg Topamax, and 40 mg Latuda. She reports she tried to refill her prescriptions earlier today at a [facility] she hasn't gone to before to no avail. She reports she was not able to refill her prescriptions because her primary insurance   and the pharmacy tried to verify that her primary insurance is , so they can bill her secondary insurance, but there was an issue with her secondary insurance being billable. The patient reports she she used to get her prescriptions at [facility], but she can't anymore because she is not a student.     Allergies:  No known drug allergies     Medications:    Cetirizine   Clonazepam    Epinephrine   levonorgestrel    lurasidone    Ranitidine   topiramate   trazodone   Ziprasidone     Past Medical History:    Bipolar disorder  Depression Paranoid type delusional disorder  Psychosis  Anxiety  Allergic rhinitis  GERD w/o esophagitis    Past Surgical History:    Tonsillectomy  Adenoidectomy    Family History:    History reviewed. No pertinent family history.     Social History:  Smoking status: No  Alcohol use: Rarely  Marital Status:  Single [1]     Review of Systems  No review of systems was performed.     Physical Exam     Patient Vitals for the past 24 hrs:   BP Temp Temp src Pulse Resp SpO2 Height Weight   18 2031 (!) 133/95 98.4  F (36.9  C) Oral 87 18 100 % 1.6 m (5' 3\") 90.7 kg (200 lb)       Physical Exam  General: Patient is alert and normal appearing.  HEENT: Head atraumatic    Eyes: pupils equal and reactive. Conjunctiva clear   Nares: patent   Oropharynx: no lesions, uvula midline, no palatal draping, normal voice, no trismus  Neck: Supple without lymphadenopathy, no meningismus  Chest: Heart " regular rate and rhythm.   Lungs: Equal clear to auscultation with no wheeze or rales  Abdomen: Soft, non tender, nondistended, normal bowel sounds  Back: No costovertebral angle tenderness, no midline C, T or L spine tenderness  Neuro: Grossly nonfocal, normal speech, strength equal bilaterally, CN 2-12 intact  Extremities: No deformities, equal radial and DP pulses. No clubbing, cyanosis.  No edema  Skin: Warm and dry with no rash.     Emergency Department Course     Emergency Department Course:  Past medical records, nursing notes, and vitals reviewed.  2048: I performed an exam of the patient and obtained history, as documented above.    2131: I rechecked the patient. Findings and plan explained to the Patient. Patient discharged home with instructions regarding supportive care, medications, and reasons to return. The importance of close follow-up was reviewed.     Impression & Plan      Medical Decision Making:  Patient is a 32-year-old female who presents the emergency department due to not being able to have her medications filled at [facility].  Patient was discharged from station 77 today with a plan for restarting her Latuda, Topamax, Geodon.  When patient went to [facility] they stated her primary insurance had been canceled and her secondary insurance would not fill the medications until they confirmed her primary insurance was canceled.  Due to her feeling the need to continue her medication she presents the emergency department.  She denies suicidal or homicidal ideation at this time.  She denies any new symptoms.  Gave patient resources for follow-up with social work.  He was given a 3 day supply of medications while she could sort out her insurance situation.  Patient expressed agreement and understand the plan for return.  All patient's questions and concerns addressed.    Diagnosis:    ICD-10-CM   1. Encounter for medication refill Z76.0     Disposition:  discharged to home    Carla  Mercy  8/29/2018    EMERGENCY DEPARTMENT  I, Carla Mercy, am serving as a scribe at 8:48 PM on 8/29/2018 to document services personally performed by Maddy Haines MD based on my observations and the provider's statements to me.        Maddy Haines MD  08/30/18 0046

## 2018-08-30 NOTE — LETTER
My Depression Action Plan  Name: Maddy Ortiz   Date of Birth 1985  Date: 8/31/2018    My doctor: Shirley Freeman   My clinic: [address] [address] [phone]          GREEN    ZONE   Good Control    What it looks like:     Things are going generally well. You have normal up s and down s. You may even feel depressed from time to time, but bad moods usually last less than a day.   What you need to do:  1. Continue to care for yourself (see self care plan)  2. Check your depression survival kit and update it as needed  3. Follow your physician s recommendations including any medication.  4. Do not stop taking medication unless you consult with your physician first.           YELLOW         ZONE Getting Worse    What it looks like:     Depression is starting to interfere with your life.     It may be hard to get out of bed; you may be starting to isolate yourself from others.    Symptoms of depression are starting to last most all day and this has happened for several days.     You may have suicidal thoughts but they are not constant.   What you need to do:     1. Call your care team, your response to treatment will improve if you keep your care team informed of your progress. Yellow periods are signs an adjustment may need to be made.     2. Continue your self-care, even if you have to fake it!    3. Talk to someone in your support network    4. Open up your depression survival kit           RED    ZONE Medical Alert - Get Help    What it looks like:     Depression is seriously interfering with your life.     You may experience these or other symptoms: You can t get out of bed most days, can t work or engage in other necessary activities, you have trouble taking care of basic hygiene, or basic responsibilities, thoughts of suicide or death that will not go away, self-injurious behavior.     What you need to do:  1. Call your  care team and request a same-day appointment. If they are not available (weekends or after hours) call your local crisis line, emergency room or 911.            Depression Self Care Plan / Survival Kit    Self-Care for Depression  Here s the deal. Your body and mind are really not as separate as most people think.  What you do and think affects how you feel and how you feel influences what you do and think. This means if you do things that people who feel good do, it will help you feel better.  Sometimes this is all it takes.  There is also a place for medication and therapy depending on how severe your depression is, so be sure to consult with your medical provider and/ or Behavioral Health Consultant if your symptoms are worsening or not improving.     In order to better manage my stress, I will:    Exercise  Get some form of exercise, every day. This will help reduce pain and release endorphins, the  feel good  chemicals in your brain. This is almost as good as taking antidepressants!  This is not the same as joining a gym and then never going! (they count on that by the way ) It can be as simple as just going for a walk or doing some gardening, anything that will get you moving.      Hygiene   Maintain good hygiene (Get out of bed in the morning, Make your bed, Brush your teeth, Take a shower, and Get dressed like you were going to work, even if you are unemployed).  If your clothes don't fit try to get ones that do.    Diet  I will strive to eat foods that are good for me, drink plenty of water, and avoid excessive sugar, caffeine, alcohol, and other mood-altering substances.  Some foods that are helpful in depression are: complex carbohydrates, B vitamins, flaxseed, fish or fish oil, fresh fruits and vegetables.    Psychotherapy  I agree to participate in Individual Therapy (if recommended).    Medication  If prescribed medications, I agree to take them.  Missing doses can result in serious side effects.  I  understand that drinking alcohol, or other illicit drug use, may cause potential side effects.  I will not stop my medication abruptly without first discussing it with my provider.    Staying Connected With Others  I will stay in touch with my friends, family members, and my primary care provider/team.    Use your imagination  Be creative.  We all have a creative side; it doesn t matter if it s oil painting, sand castles, or mud pies! This will also kick up the endorphins.    Witness Beauty  (AKA stop and smell the roses) Take a look outside, even in mid-winter. Notice colors, textures. Watch the squirrels and birds.     Service to others  Be of service to others.  There is always someone else in need.  By helping others we can  get out of ourselves  and remember the really important things.  This also provides opportunities for practicing all the other parts of the program.    Humor  Laugh and be silly!  Adjust your TV habits for less news and crime-drama and more comedy.    Control your stress  Try breathing deep, massage therapy, biofeedback, and meditation. Find time to relax each day.     My support system    Clinic Contact:  Phone number:    Contact 1: Rosemary STARKS  Phone number: [phone]   Contact 2:  Phone number:    Voodoo/:  Phone number:    Therapist:  Phone number:    Local crisis center:   National Suicide Prevention Lifeline Phone number: 1-496.893.5334   Other community support:  Phone number:

## 2018-08-30 NOTE — PROGRESS NOTES
Clinic Care Coordination Contact    Clinic Care Coordination Contact  OUTREACH-Social Work    Referral Information:  Referral Source: IP Report Discharged from  SD on 8/29/18  Admitted for paranoia and Psychosis on 8/24/18     Primary Diagnosis: Behavioral Health    Chief Complaint   Patient presents with     Clinic Care Coordination - Post Hospital        [facility] Utilization:   Clinic Utilization  Difficulty keeping appointments:: Yes  Compliance Concerns: Yes  No-Show Concerns: Yes  Utilization    Last refreshed: 8/30/2018  4:19 PM:  No Show Count (past year) 17       Last refreshed: 8/30/2018  4:19 PM:  ED visits 3       Last refreshed: 8/30/2018  4:19 PM:  Hospital admissions 0          Current as of: 8/30/2018  4:19 PM         Clinical Concerns:  Current Medical Concerns:    Patient Active Problem List   Diagnosis     Animal dander allergy     CARDIOVASCULAR SCREENING; LDL GOAL LESS THAN 160     Psychosis     Paranoid type delusional disorder (H)     Bipolar 1 disorder (H)     Insomnia     Depression with anxiety     Seasonal allergic rhinitis due to pollen     Allergic rhinitis due to mold     Allergic rhinitis due to animal dander     Allergic rhinitis due to dust mite     Encounter for IUD insertion     Schizoaffective disorder, bipolar type (H)     Gastroesophageal reflux disease without esophagitis     Paranoia (psychosis) (H)      Current Behavioral Concerns: Recent hospitalization for paranoia.    Paranoid thoughts about neighbors Sent to Hospital from Psychiatrists office..  CPS called for daughter who was with her.   Education Provided to patient: Role of SWCC,      Health Maintenance Reviewed:    Clinical Pathway:   Clinic Care Coordinator - Depression Initial Assessment    Hospital summary:   Day of hospital discharge: 8/29/18  What recommendations were made for follow up after your recent hospitalization? Follow up with therapist and psychiatrist  Have the follow up appointments been  scheduled? Yes  If not, can I help you set up these appointments? N/A  Transportation concerns (GOAL):: No    Symptoms:  Pt identified current zone as: Yellow    Pt identifies current symptoms of: inability to concentrate    Most recent PHQ-9 score:   PHQ-9 SCORE 7/31/2018   Total Score -   Total Score 13     Pt is taking medications as prescribed:  Yes  Pt has the following behavioral providers/supports involved:  Therapist and Psych appts  Depression action plan/survival kit reviewed, revised as needed and sent to patient:  Yes  Care plan developed/updated that outlines goals with help of patient and mailed to pt:   Yes    Medication Management:  Pt was given a 3 day supply. Confusion over her insurance, MA is still active BC/BS ended.. Pt will  mediations      Functional Status:  Dependent ADLs:: Independent  Mobility Status: Independent    Living Situation:  Current living arrangement:: I live in a private home with family (with 10 Y.O daughter , DTR currently staying with grandparents, CPS involved.)  Type of residence:: Apartment    Diet/Exercise/Sleep:   not addressed    Transportation:  Transportation concerns (GOAL):: No  Transportation means:: Regular car, Has a car and drives. Trying to use bicycle more than drive. Has Medical transport, Friend helps     Psychosocial:  Mental health DX:: Yes (Schizo affective disorder -Bi polar)  Mental health DX how managed:: Medication, Outpatient Counseling, Psychiatrist  Mental health management concern (GOAL):: Yes     Financial/Insurance: Medical Assistance   Financial/Insurance concerns (GOAL):: Yes (needed to clarify insurance, 8/30 MA is active)    Resources and Interventions:  Current Resources:    ; Community Resources: Child  Protective Services, County Programs,  Mental Health, Transportation Services (Therapist at [facility], Psychiatrist , Child Protection for daughter)  Supplies used at home:: None  Equipment Currently Used at Home:  none    [facility]  Cancelled appt for 8/30 rescheduled for 9/6.   Psychiatrist Dr. Krishna Milner [facility] in [place]. [phone]     Goals:   Goals        General    Medication 1 (pt-stated)     Notes - Note created  8/30/2018  4:26 PM by Padmini Beatty LSW    Goal Statement: I need to get my medication.  Measure of Success: will have medication  Supportive Steps to Achieve: Verified MA is active  Barriers:   Strengths: pt called ins, will go to pharmacy to get meds  Date to Achieve By: 9/1/18  Patient expressed understanding of goal: fair          Patient/Caregiver understanding: fair    Outreach Frequency: weekly  Future Appointments              Today ALLERGY MA - [facility]    In 1 week ALLERGY MA - [facility]    In 1 month Maldonado Michele MD M [facility] Weight Management, [facility]    In 2 months Maldonado Michele MD M [facility] Medical Weight Management, [facility]      Plan: Pt will get medications from pharmacy  SW will contact 8/31/18 ,as pt was getting anxious. Will call crisis numbers or parents if needed.    Rosemary Beatty [facility]  , Clinic Care Coordination  Clinics:  [facility] [place], [place]  [phone]   8/31/2018   8:50 AM    Addendum: Social Work   Call placed to patient as requested yesterday. Message left asking pt to call me back regarding her medication.    Plan:  [facility] will call pt in 3-5 business days if no response to message.    Rosemary Beatty [facility]  , Clinic Care Coordination  Clinics:  [facility], [place]  [phone]   8/31/2018   9:10 AM

## 2018-08-30 NOTE — TREATMENT PLAN
I spoke with this patient and she wanted to call her insurance company to see if her medications were covered.  I encouraged this but also explained that if her insurance doesn't work she should go to [facility] as they have some prescriptions that are $4. She agreed to this.  Registration registered her with MA (per epic chart) so she should be covered.  I think having her call the insurance number will help her sort her insurance out if there are issues.

## 2018-08-31 ENCOUNTER — ALLIED HEALTH/NURSE VISIT (OUTPATIENT)
Dept: ALLERGY | Facility: CLINIC | Age: 33
End: 2018-08-31
Payer: MEDICAID

## 2018-08-31 ENCOUNTER — PATIENT OUTREACH (OUTPATIENT)
Dept: CARE COORDINATION | Facility: CLINIC | Age: 33
End: 2018-08-31

## 2018-08-31 DIAGNOSIS — J30.9 ALLERGIC RHINITIS: Primary | ICD-10-CM

## 2018-08-31 PROCEDURE — 99207 ZZC DROP WITH A PROCEDURE: CPT

## 2018-08-31 PROCEDURE — 95117 IMMUNOTHERAPY INJECTIONS: CPT

## 2018-08-31 NOTE — PROGRESS NOTES
Patient presented after waiting 30 minutes with no reaction to  injections. Discharged from clinic.    Shirley Grant RN

## 2018-08-31 NOTE — PROGRESS NOTES
Clinic Care Coordination Contact  Care Team Conversations  Received a return call from pt. She was vague in her speech and answers.  Looking to me to carry the conversation.  Said she cried much of the morning , but better now. Discussed  things she could do to move out of that mood,. Go outside, listen to music , call someone, Go for a bike ride.  She responded as thought she had neve thought of tying to do something other than sitting and crying.  Discussed things on the Depression Action Plan that I mailed to her earlier today; Stated No plans of hurting herself or others.  She did not think she was provided with crisis numbers when she left the hospital.   I had her write down the [place] Suicide Prevention Lifeline. Pt stated she would call her parents or friends if needed    .     She was able to get her medications  this morning.  Her Medical Assistance is active . Pt stated she takes al of her pills at night.  Discussed ways to remember . Pt shared she feels better on current medications. If she needs to go back to the hospital she only wants to go to [facility]. She does not want to go back to [place].     She has therapy appointments scheduled with her therapist Consuelo at [facility] for 9/6,9/13,9/20,/9/27. She will see her Psychiatrist  DR Milner on Sept 19th..     Introduction to CC letter ,care plan, and Depression Action Plan, mailed to pt. this morning.     Plan : Pt will see therapist and Psychiatrist, call crisis line or family if needed.  SW will follow up in 2 weeks     Rosemary Beatty [facility] Services  , Clinic Care Coordination  Clinics:  [facility], [place]  [phone]   8/31/2018   2:28 PM

## 2018-08-31 NOTE — MR AVS SNAPSHOT
After Visit Summary   8/31/2018    Maddy Ortiz    MRN: [id number]           Patient Information     Date Of Birth          1985        Visit Information        Provider Department      8/31/2018 3:00 PM ALLERGY [facility]        Today's Diagnoses     Allergic rhinitis    -  1       Follow-ups after your visit        Your next 10 appointments already scheduled     Sep 12, 2018 10:45 AM CDT   Nurse Only with ALLERGY [facility] ([facility])    [address] [address]   [phone]           Every allergy patient MUST wait 30 minutes after their allergy shot. No exceptions.  Xolair shots #1-3 should plan to wait 2 hours in clinic Xolair shots after #4 should plan 30 minute wait in clinic            Oct 15, 2018 10:45 AM CDT   (Arrive by 10:30 AM)   Return Weight Management Visit with Maldonado Michele MD   [facility] ([facility])    [address] [place] [id number] [phone]            Nov 05, 2018 11:00 AM CST   (Arrive by 10:45 AM)   New Patient Visit with Maldonado Michele MD   [facility] ([facility])    [address] [place] [id number] [phone]              Who to contact     If you have questions or need follow up information about today's clinic visit or your schedule please contact [facility] directly at [phone].  Normal or non-critical lab and imaging results will be communicated to you by MyChart, letter or phone within 4 business days after the clinic has received the results. If you do not hear from us within 7 days, please contact the clinic through MyCleanhart or phone. If you have a critical or abnormal lab result, we will notify you by phone as soon as possible.  Submit refill requests through Embrace or  "call your pharmacy and they will forward the refill request to us. Please allow 3 business days for your refill to be completed.          Additional Information About Your Visit        MyChart Information     Theravaschart lets you send messages to your doctor, view your test results, renew your prescriptions, schedule appointments and more. To sign up, go to [email or web address] . Click on \"Log in\" on the left side of the screen, which will take you to the Welcome page. Then click on \"Sign up Now\" on the right side of the page.     You will be asked to enter the access code listed below, as well as some personal information. Please follow the directions to create your username and password.     Your access code is: [email or web address]  Expires: 12/3/2018  3:17 PM     Your access code will  in 90 days. If you need help or a new code, please call your [facility] clinic or [phone].        Care EveryWhere ID     This is your Care EveryWhere ID. This could be used by other organizations to access your [facility] medical records  [id number]         Blood Pressure from Last 3 Encounters:   18 128/89   18 (!) 135/99   18 (!) 133/95    Weight from Last 3 Encounters:   18 89.4 kg (197 lb)   18 89.4 kg (197 lb)   18 90.7 kg (200 lb)              We Performed the Following     Allergy Shot: Two or more injections        Primary Care Provider Office Phone # Fax #    Shirley PHILL [facility] [phone] [address] [address]        Goals        General    Medication 1 (pt-stated)     Notes - Note created  2018  4:26 PM by Padmini Beatty LSW    Goal Statement: I need to get my medication.  Measure of Success: will have medication  Supportive Steps to Achieve: Verified MA is active  Barriers:   Strengths: pt called ins, will go to pharmacy to get meds  Date to Achieve By: 18  Patient expressed understanding of goal: fair        Mental " Health Management (pt-stated)     Notes - Note created  8/31/2018  9:12 AM by Padmini Beatty LSW    Goal Statement: I will follow up with my therapist and Psychiatrist  Measure of Success: will keep scheduled appts  Supportive Steps to Achieve: has appts  Barriers:   Strengths: has established supports   Date to Achieve By:Sept 15th.   Patient expressed understanding of goal: yes          Equal Access to Services     [facility]: Hadii aad ku hadasho Soomaali, waaxda luqadaha, qaybta kaalmada adeegyada, waxay idiin hayaan adeeg kharash la'aan . So [facility] [phone].    ATENCIÓN: Si habla español, tiene a titus disposición servicios gratuitos de asistencia lingüística. Llame al [phone].    We comply with applicable federal civil rights laws and [place] laws. We do not discriminate on the basis of race, color, national origin, age, disability, sex, sexual orientation, or gender identity.            Thank you!     Thank you for choosing [facility]  for your care. Our goal is always to provide you with excellent care. Hearing back from our patients is one way we can continue to improve our services. Please take a few minutes to complete the written survey that you may receive in the mail after your visit with us. Thank you!             Your Updated Medication List - Protect others around you: Learn how to safely use, store and throw away your medicines at www.disposemymeds.org.          This list is accurate as of 8/31/18 11:59 PM.  Always use your most recent med list.                   Brand Name Dispense Instructions for use Diagnosis    cetirizine 10 MG tablet    zyrTEC     Take 10 mg by mouth 2 times daily as needed for allergies        EPINEPHrine 0.3 MG/0.3ML injection 2-pack    EPIPEN 2-ODETTE    0.6 mL    Inject 0.3 mLs (0.3 mg) into the muscle once as needed for anaphylaxis    Need for desensitization to allergens       KLONOPIN PO      Take 1 mg by mouth daily as needed for anxiety         levonorgestrel 20 MCG/24HR IUD    MIRENA     1 each (20 mcg) by Intrauterine route continuous    Encounter for insertion of mirena IUD       lurasidone 60 MG Tabs tablet    LATUDA    30 tablet    Take 1 tablet (60 mg) by mouth daily    Paranoia (psychosis) (H)       * ORDER FOR ALLERGEN IMMUNOTHERAPY 5 mL vial     5 mL    Cat Hair, Standardized 10,000 BAU/mL, ALK  3.0 ml Dog Hair Dander, A. P.  1:100 w/v, HS  1.0 ml Dust Mites F 30,000AU/mL, HS  0.5 ml Dust Mites P. 30,000 AU/mL, HS  0.5 ml  Diluent: HSA qs to 5ml    Allergic rhinitis due to animal dander, Allergic rhinitis due to dust mite       * ORDER FOR ALLERGEN IMMUNOTHERAPY 5 mL vial     5 mL    Alternaria Tenuis 1:10 w/v, HS  0.5 ml Epicoccum Nigrum 1:10 w/v, HS 0.5 ml Hormodendrum Cladosporioides 1:10 w/v, HS 0.5 ml Diluent: HSA qs to 5ml    Allergic rhinitis due to mold       * ORDER FOR ALLERGEN IMMUNOTHERAPY 5 mL vial     5 mL    Estuardo, White  1:20 w/v, HS  0.5 ml Birch Mix PRW 1:20 w/v, HS  0.5 ml Elm, American 1:20 w/v, HS  0.5 ml Hackberry 1:20 w/v, HS 0.5 ml Hickory, Shagbark 1:20 w/v, HS  0.5 ml Cash Mix RW 1:20 w/v, HS 0.5 ml Oak Mix RVW 1:20 w/v, HS 0.5 ml [place] Tree, Black 1:20 w/v, HS 0.5 ml [place], Black 1:20 w/v, HS 0.5 ml Diluent: HSA qs to 5ml    Chronic seasonal allergic rhinitis due to pollen       * ORDER FOR ALLERGEN IMMUNOTHERAPY 5 mL vial     5 mL    Kochia 1:20 w/v, HS 1.0 ml Nettle 1:20 w/v, HS 1.0 ml Plantain, English 1:20 w/v, HS 1.0 ml Ragweed Mixed 1:20 w/v ALK  0.6 ml Russian Thistle 1:20 w/v, HS 1.0 ml Diluent: HSA qs to 5ml    Chronic seasonal allergic rhinitis due to pollen       ranitidine 150 MG tablet    ZANTAC     Take 150 mg by mouth At Bedtime        topiramate 25 MG tablet    TOPAMAX    180 tablet    Take 3 tablets (75 mg) by mouth 2 times daily    Schizoaffective disorder, bipolar type (H)       traZODone 50 MG tablet    DESYREL    30 tablet    Take 50 mg by mouth nightly as needed for sleep    Insomnia,  unspecified type       ziprasidone 80 MG capsule    GEODON    30 capsule    Take 1 capsule (80 mg) by mouth At Bedtime    Paranoia (psychosis) (H)       * Notice:  This list has 4 medication(s) that are the same as other medications prescribed for you. Read the directions carefully, and ask your doctor or other care provider to review them with you.

## 2018-09-03 ENCOUNTER — HOSPITAL ENCOUNTER (EMERGENCY)
Facility: CLINIC | Age: 33
Discharge: HOME | End: 2018-09-03
Attending: EMERGENCY MEDICINE | Admitting: EMERGENCY MEDICINE
Payer: MEDICAID

## 2018-09-03 VITALS
DIASTOLIC BLOOD PRESSURE: 99 MMHG | RESPIRATION RATE: 18 BRPM | TEMPERATURE: 97.5 F | OXYGEN SATURATION: 96 % | SYSTOLIC BLOOD PRESSURE: 135 MMHG | HEIGHT: 63 IN | WEIGHT: 197 LBS | BODY MASS INDEX: 34.91 KG/M2

## 2018-09-03 DIAGNOSIS — H10.31 ACUTE CONJUNCTIVITIS OF RIGHT EYE, UNSPECIFIED ACUTE CONJUNCTIVITIS TYPE: ICD-10-CM

## 2018-09-03 PROCEDURE — 99282 EMERGENCY DEPT VISIT SF MDM: CPT

## 2018-09-03 RX ORDER — PROPARACAINE HYDROCHLORIDE 5 MG/ML
SOLUTION/ DROPS OPHTHALMIC
Status: DISCONTINUED
Start: 2018-09-03 | End: 2018-09-03 | Stop reason: HOSPADM

## 2018-09-03 RX ORDER — PROPARACAINE HYDROCHLORIDE 5 MG/ML
1 SOLUTION/ DROPS OPHTHALMIC ONCE
Status: DISCONTINUED | OUTPATIENT
Start: 2018-09-03 | End: 2018-09-03 | Stop reason: HOSPADM

## 2018-09-03 RX ORDER — POLYMYXIN B SULFATE AND TRIMETHOPRIM 1; 10000 MG/ML; [USP'U]/ML
1 SOLUTION OPHTHALMIC
Qty: 1 BOTTLE | Refills: 0 | Status: SHIPPED | OUTPATIENT
Start: 2018-09-03 | End: 2018-09-04

## 2018-09-03 ASSESSMENT — ENCOUNTER SYMPTOMS
EYE REDNESS: 1
WEAKNESS: 0
EYE DISCHARGE: 1
FACIAL ASYMMETRY: 0
SPEECH DIFFICULTY: 0
PHOTOPHOBIA: 0
DIZZINESS: 0
FEVER: 0
NUMBNESS: 0
HEADACHES: 0
EYE PAIN: 1
VOMITING: 0

## 2018-09-03 ASSESSMENT — VISUAL ACUITY
OS: 20/50
OD: 20/70

## 2018-09-03 NOTE — DISCHARGE INSTRUCTIONS
"Discharge Instructions  Conjunctivitis  Conjunctivitis, or \"pinkeye\", is inflammation of the conjunctiva, which is the thin membrane that lines the inner surface of the eyelids and the whites of the eyes.   There are four main types of conjunctivitis: viral, bacterial, allergic, and non-specific. Both bacterial and viral conjunctivitis spread easily from one person to another by contact with the eye or another person s hands, by an object the infected person has touched (such as a door handle), or by sharing an object that has touched their eye (such as a towel or pillowcase). Because of this, children with bacterial conjunctivitis cannot go back to school or  until they have been on antibiotics for 24 hours.  Generally, every Emergency Department visit should have a follow-up clinic visit with either a primary or a specialty clinic/provider. Please follow-up as instructed by your emergency provider today.  VIRAL CONJUNCTIVITIS: The virus that causes the common cold and is often seen as part of a general cold typically causes this type of conjunctivitis.  This type of conjunctivitis is not treated with antibiotics, and usually lasts 3 - 5 days.  An over-the-counter antihistamine/decongestant eye drop may help to relieve the itching and irritation of viral conjunctivitis.  BACTERIAL CONJUNCTIVITIS:  This is treated with an antibiotic ointment or eye drop.  In both bacterial and viral conjunctivitis, do not wear contact lenses until your eye is no longer red.   Your contact case should be thrown away and the contacts disinfected overnight, or replaced if disposable.  NON-SPECIFIC CONJUNCTIVITIS: Sometimes a red eye is caused by other things such as dry eye, chemical exposure, or foreign body in the eye such as dust or eyelash.   All of these problems generally improve on their own within 24 hours.  ALLERGIC CONJUNCTIVITIS: These are eye symptoms caused by allergies. This type of conjunctivitis will be treated " with allergy medications.    Return to the Emergency Department if:    If you have blurry vision.    If you have increasing eye pain or drainage.    If you have new redness or swelling in the skin around the eye.  If you were given a prescription for medicine here today, be sure to read all of the information (including the package insert) that comes with your prescription.  This will include important information about the medicine, its side effects, and any warnings that you need to know about.  The pharmacist who fills the prescription can provide more information and answer questions you may have about the medicine.  If you have questions or concerns that the pharmacist cannot address, please call or return to the Emergency Department.   Remember that you can always come back to the Emergency Department if you are not able to see your regular provider in the amount of time listed above, if you get any new symptoms, or if there is anything that worries you.

## 2018-09-03 NOTE — ED AVS SNAPSHOT
"  Emergency Department    [address] [facility] [address]    Phone:  [phone]    Fax:  [phone]                                       Maddy Ortiz   MRN: [id number]    Department:   Emergency Department   Date of Visit:  9/3/2018           Patient Information     Date Of Birth          1985        Your diagnoses for this visit were:     Acute conjunctivitis of right eye, unspecified acute conjunctivitis type        You were seen by Mason Vail MD.      Follow-up Information     Follow up with  Emergency Department.    Specialty:  EMERGENCY MEDICINE    Why:  As needed, If symptoms worsen    Contact information:    [address] [facility] [address] [phone]        Follow up with Shirley Freeman APRN CNP In 3 days.    Specialty:  Nurse Practitioner    Why:  If symptoms persist    Contact information:    [address] [facility] [address]  [phone]          Discharge Instructions       Discharge Instructions  Conjunctivitis  Conjunctivitis, or \"pinkeye\", is inflammation of the conjunctiva, which is the thin membrane that lines the inner surface of the eyelids and the whites of the eyes.   There are four main types of conjunctivitis: viral, bacterial, allergic, and non-specific. Both bacterial and viral conjunctivitis spread easily from one person to another by contact with the eye or another person s hands, by an object the infected person has touched (such as a door handle), or by sharing an object that has touched their eye (such as a towel or pillowcase). Because of this, children with bacterial conjunctivitis cannot go back to school or  until they have been on antibiotics for 24 hours.  Generally, every Emergency Department visit should have a follow-up clinic visit with either a primary or a specialty clinic/provider. Please follow-up as instructed by your emergency provider today.  VIRAL CONJUNCTIVITIS: The virus " that causes the common cold and is often seen as part of a general cold typically causes this type of conjunctivitis.  This type of conjunctivitis is not treated with antibiotics, and usually lasts 3 - 5 days.  An over-the-counter antihistamine/decongestant eye drop may help to relieve the itching and irritation of viral conjunctivitis.  BACTERIAL CONJUNCTIVITIS:  This is treated with an antibiotic ointment or eye drop.  In both bacterial and viral conjunctivitis, do not wear contact lenses until your eye is no longer red.   Your contact case should be thrown away and the contacts disinfected overnight, or replaced if disposable.  NON-SPECIFIC CONJUNCTIVITIS: Sometimes a red eye is caused by other things such as dry eye, chemical exposure, or foreign body in the eye such as dust or eyelash.   All of these problems generally improve on their own within 24 hours.  ALLERGIC CONJUNCTIVITIS: These are eye symptoms caused by allergies. This type of conjunctivitis will be treated with allergy medications.    Return to the Emergency Department if:    If you have blurry vision.    If you have increasing eye pain or drainage.    If you have new redness or swelling in the skin around the eye.  If you were given a prescription for medicine here today, be sure to read all of the information (including the package insert) that comes with your prescription.  This will include important information about the medicine, its side effects, and any warnings that you need to know about.  The pharmacist who fills the prescription can provide more information and answer questions you may have about the medicine.  If you have questions or concerns that the pharmacist cannot address, please call or return to the Emergency Department.   Remember that you can always come back to the Emergency Department if you are not able to see your regular provider in the amount of time listed above, if you get any new symptoms, or if there is anything that  worries you.      Your next 10 appointments already scheduled     Sep 12, 2018 10:45 AM CDT   Nurse Only with ALLERGY [facility] ([facility])    [address] [facility] [address]   [phone]           Every allergy patient MUST wait 30 minutes after their allergy shot. No exceptions.  Xolair shots #1-3 should plan to wait 2 hours in clinic Xolair shots after #4 should plan 30 minute wait in clinic            Oct 15, 2018 10:45 AM CDT   (Arrive by 10:30 AM)   Return Weight Management Visit with Maldonado Michele MD   [facility] ([facility])    [address] [place] [id number] [phone]            Nov 05, 2018 11:00 AM CST   (Arrive by 10:45 AM)   New Patient Visit with Maldonado Michele MD   [facility] ([facility])    [address] [place] [id number] [phone]              24 Hour Appointment Hotline       To make an appointment at any [facility], call [phone] ([phone]). If you don't have a family doctor or clinic, we will help you find one. [facility] are conveniently located to serve the needs of you and your family.             Review of your medicines      START taking        Dose / Directions Last dose taken    trimethoprim-polymyxin b ophthalmic solution   Commonly known as:  POLYTRIM   Dose:  1 drop   Quantity:  1 Bottle        Apply 1 drop to eye every 3 hours for 7 days   Refills:  0          Our records show that you are taking the medicines listed below. If these are incorrect, please call your family doctor or clinic.        Dose / Directions Last dose taken    cetirizine 10 MG tablet   Commonly known as:  zyrTEC   Dose:  10 mg        Take 10 mg by mouth 2 times daily as needed for allergies   Refills:  0        EPINEPHrine 0.3 MG/0.3ML injection  2-pack   Commonly known as:  EPIPEN 2-ODETTE   Dose:  0.3 mg   Quantity:  0.6 mL        Inject 0.3 mLs (0.3 mg) into the muscle once as needed for anaphylaxis   Refills:  3        KLONOPIN PO   Dose:  1 mg        Take 1 mg by mouth daily as needed for anxiety   Refills:  0        levonorgestrel 20 MCG/24HR IUD   Commonly known as:  MIRENA   Dose:  1 each        1 each (20 mcg) by Intrauterine route continuous   Refills:  0        lurasidone 60 MG Tabs tablet   Commonly known as:  LATUDA   Dose:  60 mg   Quantity:  30 tablet        Take 1 tablet (60 mg) by mouth daily   Refills:  0        * ORDER FOR ALLERGEN IMMUNOTHERAPY 5 mL vial   Quantity:  5 mL        Cat Hair, Standardized 10,000 BAU/mL, ALK  3.0 ml Dog Hair Dander, A. P.  1:100 w/v, HS  1.0 ml Dust Mites F 30,000AU/mL, HS  0.5 ml Dust Mites P. 30,000 AU/mL, HS  0.5 ml  Diluent: HSA qs to 5ml   Refills:  PRN        * ORDER FOR ALLERGEN IMMUNOTHERAPY 5 mL vial   Quantity:  5 mL        Alternaria Tenuis 1:10 w/v, HS  0.5 ml Epicoccum Nigrum 1:10 w/v, HS 0.5 ml Hormodendrum Cladosporioides 1:10 w/v, HS 0.5 ml Diluent: HSA qs to 5ml   Refills:  PRN        * ORDER FOR ALLERGEN IMMUNOTHERAPY 5 mL vial   Quantity:  5 mL        Estuardo, White  1:20 w/v, HS  0.5 ml Birch Mix PRW 1:20 w/v, HS  0.5 ml Elm, American 1:20 w/v, HS  0.5 ml Hackberry 1:20 w/v, HS 0.5 ml Hickory, Shagbark 1:20 w/v, HS  0.5 ml Bartlett Mix RW 1:20 w/v, HS 0.5 ml Oak Mix RVW 1:20 w/v, HS 0.5 ml [place] Tree, Black 1:20 w/v, HS 0.5 ml [place] Luis, Black 1:20 w/v, HS 0.5 ml Diluent: HSA qs to 5ml   Refills:  PRN        * ORDER FOR ALLERGEN IMMUNOTHERAPY 5 mL vial   Quantity:  5 mL        Kochia 1:20 w/v, HS 1.0 ml Nettle 1:20 w/v, HS 1.0 ml Plantain, English 1:20 w/v, HS 1.0 ml Ragweed Mixed 1:20 w/v ALK  0.6 ml Russian Thistle 1:20 w/v, HS 1.0 ml Diluent: HSA qs to 5ml   Refills:  PRN        ranitidine 150 MG tablet   Commonly known as:  ZANTAC   Dose:  150 mg        Take 150 mg by mouth At Bedtime   Refills:   0        topiramate 25 MG tablet   Commonly known as:  TOPAMAX   Dose:  75 mg   Quantity:  180 tablet        Take 3 tablets (75 mg) by mouth 2 times daily   Refills:  0        traZODone 50 MG tablet   Commonly known as:  DESYREL   Dose:  50 mg   Quantity:  30 tablet        Take 50 mg by mouth nightly as needed for sleep   Refills:  3        ziprasidone 80 MG capsule   Commonly known as:  GEODON   Dose:  80 mg   Quantity:  30 capsule        Take 1 capsule (80 mg) by mouth At Bedtime   Refills:  0        * Notice:  This list has 4 medication(s) that are the same as other medications prescribed for you. Read the directions carefully, and ask your doctor or other care provider to review them with you.            Prescriptions were sent or printed at these locations (1 Prescription)                   Other Prescriptions                Printed at Department/Unit printer (1 of 1)         trimethoprim-polymyxin b (POLYTRIM) ophthalmic solution                Orders Needing Specimen Collection     None      Pending Results     No orders found from 9/1/2018 to 9/4/2018.            Pending Culture Results     No orders found from 9/1/2018 to 9/4/2018.            Pending Results Instructions     If you had any lab results that were not finalized at the time of your Discharge, you can call the ED Lab Result RN at [phone]. You will be contacted by this team for any positive Lab results or changes in treatment. The nurses are available 7 days a week from 10A to 6:30P.  You can leave a message 24 hours per day and they will return your call.        Test Results From Your Hospital Stay               Clinical Quality Measure: Blood Pressure Screening     Your blood pressure was checked while you were in the emergency department today. The last reading we obtained was  BP: (!) 135/99 . Please read the guidelines below about what these numbers mean and what you should do about them.  If your systolic blood pressure (the top  number) is less than 120 and your diastolic blood pressure (the bottom number) is less than 80, then your blood pressure is normal. There is nothing more that you need to do about it.  If your systolic blood pressure (the top number) is 120-139 or your diastolic blood pressure (the bottom number) is 80-89, your blood pressure may be higher than it should be. You should have your blood pressure rechecked within a year by a primary care provider.  If your systolic blood pressure (the top number) is 140 or greater or your diastolic blood pressure (the bottom number) is 90 or greater, you may have high blood pressure. High blood pressure is treatable, but if left untreated over time it can put you at risk for heart attack, stroke, or kidney failure. You should have your blood pressure rechecked by a primary care provider within the next 4 weeks.  If your provider in the emergency department today gave you specific instructions to follow-up with your doctor or provider even sooner than that, you should follow that instruction and not wait for up to 4 weeks for your follow-up visit.        Thank you for choosing [facility]       Thank you for choosing [facility] for your care. Our goal is always to provide you with excellent care. Hearing back from our patients is one way we can continue to improve our services. Please take a few minutes to complete the written survey that you may receive in the mail after you visit with us. Thank you!        Care EveryWhere ID     This is your Care EveryWhere ID. This could be used by other organizations to access your [facility] medical records  [id number]        Equal Access to Services     ARMOND MCKEON : Hadii linda Ackerman, waaxda luqadaha, qaybta kaalmada russ, lincoln maciel . So [facility] [phone].    ATENCIÓN: Si habla español, tiene a titus disposición servicios gratuitos de asistencia lingüística. Llame al [phone].    We comply with  applicable federal civil rights laws and [place] laws. We do not discriminate on the basis of race, color, national origin, age, disability, sex, sexual orientation, or gender identity.            After Visit Summary       This is your record. Keep this with you and show to your community pharmacist(s) and doctor(s) at your next visit.

## 2018-09-03 NOTE — ED PROVIDER NOTES
History     Chief Complaint:  Eye pain    HPI   Maddy Ortiz is a 32 year old female with history of bipolar disorder, and paranoid delusional disorder who presents for evaluation of right eye pain and redness.  Patient states that she woke up this morning with the symptoms.  She has had a significant amount of tearing associated with this as well, but no purulent discharge.  She denies history of injury to the eye, tanning bed usage, exposure to sand, dust, or other fine particles.  The patient wears contacts, but has felt that it has been somewhat more difficult to see out of the side.  She denies itching, or other upper respiratory symptoms.  Patient denies numbness, weakness, headache, vomiting.  The patient is a contact lens wearer, and denies sleeping in her contact lenses.  She does not currently have working pair of glasses at this time.  Of note, the patient does report that one of her friends who she spent time with during the last several days has also been experiencing similar symptoms.    Allergies:  Animal Dander  Nkda [No Known Drug Allergies]  Seasonal Allergies     Medications:    cetirizine (ZYRTEC) 10 MG tablet  ClonazePAM (KLONOPIN PO)  EPINEPHrine (EPIPEN 2-ODETTE) 0.3 MG/0.3ML injection 2-pack  levonorgestrel (MIRENA) 20 MCG/24HR IUD  Latuda  ranitidine (ZANTAC) 150 MG tablet  topiramate (TOPAMAX) 25 MG tablet  traZODone (DESYREL) 50 MG tablet  ziprasidone (GEODON) 80 MG capsule     Past Medical History:    Paranoia psychosis  GERD  Schizoaffective bipolar disorder  Depression with anxiety  Insomnia     Past Surgical History:    Tonsillectomy & adenoidectomy     Family History:    The patient is adopted    Social History:  Non-smoker.  Marital Status:  Single [1]       Review of Systems   Constitutional: Negative for fever.   Eyes: Positive for pain, discharge and redness. Negative for photophobia.   Gastrointestinal: Negative for vomiting.   Neurological: Negative for dizziness, facial  "asymmetry, speech difficulty, weakness, numbness and headaches.   All other systems reviewed and are negative.    Physical Exam   First Vitals:  BP: (!) 135/99  Heart Rate: 90  Temp: 97.5  F (36.4  C)  Resp: 16  Height: 160 cm (5' 3\")  Weight: 89.4 kg (197 lb)  SpO2: 95 %  Visual Acuity-Left: 20/50  Visual Acuity-Right: 20/70      Physical Exam  General: Alert and cooperative with exam. Normal mentation.  Head:  Scalp is NC/AT  Eyes:  Right conjunctival injection with tearing, no purulent discharge.  Left eye normal.  PERRL, EOMI.  No foreign bodies present with eversion of upper and lower eyelids. Exam of eye with fluoroscein dye reveals no area of dye uptake .  No lesions or ulcerations visualized on slit-lamp exam.  IOP 15 mmHg in right eye.  ENT:  The external nose and ears are normal  CV:  Regular rate and rhythm    No pathologic murmur, rubs, or gallops.  Resp:  Breath sounds are clear bilaterally.  No crackles, wheezes, rhonchi.    Non-labored, no retractions or accessory muscle use  Skin:  Warm and dry, No rash or lesions noted.  Neuro: Oriented x 3. No gross motor deficits.        Emergency Department Course     Emergency Department Course:  Past medical records, nursing notes, and vitals reviewed.   1025: I performed an exam of the patient as documented above.   Seen in conjunction with  Mason Vail MD     I rechecked patient.    I discussed the treatment plan with the patient. She expressed understanding of this plan and consented to discharge. She will be discharged home with instructions for care and follow up. In addition, the patient will return to the emergency department if symptoms persist, worsen, if new symptoms arise or if there is any concern.  All questions were answered.      Impression & Plan    Medical Decision Making:  Maddy Ortiz is a 32 year old female who presents with right eye pain and redness.  Patient history and records reviewed.  Broad differential was considered.  On " examination, the patient has conjunctival injection of the right eye with watery discharge.  There is no evidence of corneal laceration, abrasion, ulceration, or keratitis on slit-lamp exam with and without fluorescein.  IOP normal, and pupils equal round reactive.  The patient denies floaters or other symptoms of retinal detachment or vitreous hemorrhage at this time.    I suspect the patient's symptoms are the result of an acute infectious conjunctivitis, possibly bacterial in nature though difficult to determine for sure.  The patient was given a prescription for drops as below.  She was instructed to minimize contact lens use as much as possible, though the patient does not currently have a working pair of glasses therefore she states she may need to use them some still.  Discussed indications to return to the ED if any new or worsening symptoms.  Follow-up with primary care provider if symptoms persisting.    Diagnosis:    ICD-10-CM    1. Acute conjunctivitis of right eye, unspecified acute conjunctivitis type H10.31        Disposition:   discharged to home    Discharge Medications:  Discharge Medication List as of 9/3/2018 11:30 AM      START taking these medications    Details   trimethoprim-polymyxin b (POLYTRIM) ophthalmic solution Apply 1 drop to eye every 3 hours for 7 days, Disp-1 Bottle, R-0, Local Print             Scribe Disclosure:  I, Patrice Barragan, am serving as a scribe at 10:37 AM on 9/3/2018 to document services personally performed by NANCY Price based on my observations and the provider's statements to me.    Patrice Barragan  9/3/2018   EMERGENCY DEPARTMENT      Corey Price PA-C  09/03/18 0777

## 2018-09-03 NOTE — ED NOTES
Emergency Department Attending Supervision Note  9/3/2018  10:43 AM      I evaluated this patient in conjunction with NANCY Price      Maddy Ortiz is a 32 year old female who presents with right eye redness and blurriness. She does wear contacts. She has a visual acuity of 20/70 on the right and 20/50 on the left. A friend of hers also has some eye redness. Past history also includes bipolar disorder and schizophrenia.     On exam there is some mild conjunctival injection with tearing. The cornea itself is clear. There is no sign of foreign body or abscess. Upper lid eversion showed no foreign material. Intraocular pressure was done by NANCY Price and was 15. This is unlikely to be corneal abrasion, corneal abscess, or retained foreign body. There is no sign of glaucoma. This is most consistent with conjunctivitis. The patient will be given some antibiotic drops and should follow up with PCP or Ophthalmology in a few weeks.      Impression:    ICD-10-CM    1. Acute conjunctivitis of right eye, unspecified acute conjunctivitis type H10.31          Mason Vail MD Steinman, Randall Ira, MD  09/06/18 0605

## 2018-09-03 NOTE — ED AVS SNAPSHOT
Emergency Department    [address] [facility] [address]    Phone:  [phone]    Fax:  [phone]                                       Maddy Ortiz   MRN: [id number]    Department:   Emergency Department   Date of Visit:  9/3/2018           After Visit Summary Signature Page     I have received my discharge instructions, and my questions have been answered. I have discussed any challenges I see with this plan with the nurse or doctor.    ..........................................................................................................................................  Patient/Patient Representative Signature      ..........................................................................................................................................  Patient Representative Print Name and Relationship to Patient    ..................................................               ................................................  Date                                            Time    ..........................................................................................................................................  Reviewed by Signature/Title    ...................................................              ..............................................  Date                                                            Time          22EPIC Rev 08/18

## 2018-09-04 ENCOUNTER — PATIENT OUTREACH (OUTPATIENT)
Dept: CARE COORDINATION | Facility: CLINIC | Age: 33
End: 2018-09-04

## 2018-09-04 ENCOUNTER — HOSPITAL ENCOUNTER (EMERGENCY)
Facility: CLINIC | Age: 33
Discharge: HOME | End: 2018-09-04
Attending: NURSE PRACTITIONER | Admitting: NURSE PRACTITIONER
Payer: MEDICAID

## 2018-09-04 VITALS
TEMPERATURE: 97.4 F | OXYGEN SATURATION: 96 % | DIASTOLIC BLOOD PRESSURE: 89 MMHG | SYSTOLIC BLOOD PRESSURE: 128 MMHG | RESPIRATION RATE: 18 BRPM | HEIGHT: 63 IN | BODY MASS INDEX: 34.91 KG/M2 | WEIGHT: 197 LBS

## 2018-09-04 DIAGNOSIS — H10.9 BACTERIAL CONJUNCTIVITIS OF RIGHT EYE: ICD-10-CM

## 2018-09-04 PROCEDURE — G0463 HOSPITAL OUTPT CLINIC VISIT: HCPCS | Performed by: NURSE PRACTITIONER

## 2018-09-04 PROCEDURE — 99214 OFFICE O/P EST MOD 30 MIN: CPT | Mod: Z6 | Performed by: NURSE PRACTITIONER

## 2018-09-04 RX ORDER — OFLOXACIN 3 MG/ML
2 SOLUTION/ DROPS OPHTHALMIC EVERY 4 HOURS
Qty: 1 BOTTLE | Refills: 0 | Status: SHIPPED | OUTPATIENT
Start: 2018-09-04 | End: 2018-09-04

## 2018-09-04 RX ORDER — OFLOXACIN 3 MG/ML
2 SOLUTION/ DROPS OPHTHALMIC EVERY 4 HOURS
Qty: 1 BOTTLE | Refills: 0 | Status: SHIPPED | OUTPATIENT
Start: 2018-09-04 | End: 2019-01-10

## 2018-09-04 NOTE — DISCHARGE INSTRUCTIONS
Stop the polytrim eye drop.  Start ofloxacin 0.3% eye drops 2 drops to right eye every 4 hours.  Make appointment for recheck with [facility] for recheck in 1-2 days.  [phone]  Return to the emergency department for fever, increased eye pain, increased redness, or increased swelling.

## 2018-09-04 NOTE — PROGRESS NOTES
"Clinic Care Coordination Contact  ED follow up. Pt was seen in the ED on 9/3/18 for painful  [facility]. She said the Urgent Care was not open.    Still having pain.  Will schedule an appointment wit PCP if not better tomorrow.  Pt did not want to talk. \"I just want to rest:\" When asked about moods, she said \"Im Fine.\"  and ended the conversation.    Plan: Pt will follow up with PCP and Crisis numbers if needed.  SW to follow in 2 weeks.     Rosemary Beatty [facility]  , Clinic Care Coordination  Clinics:  [facility] [place], [place]  [phone]   9/4/2018   10:40 AM  "

## 2018-09-04 NOTE — ED AVS SNAPSHOT
[facility] [place] Emergency Department    [address] [facility] [address]    Phone:  [phone]    Fax:  [phone]                                       Maddy Ortiz   MRN: [id number]    Department:  [facility] [place] [facility]   Date of Visit:  9/4/2018           After Visit Summary Signature Page     I have received my discharge instructions, and my questions have been answered. I have discussed any challenges I see with this plan with the nurse or doctor.    ..........................................................................................................................................  Patient/Patient Representative Signature      ..........................................................................................................................................  Patient Representative Print Name and Relationship to Patient    ..................................................               ................................................  Date                                            Time    ..........................................................................................................................................  Reviewed by Signature/Title    ...................................................              ..............................................  Date                                                            Time          22EPIC Rev 08/18

## 2018-09-04 NOTE — ED AVS SNAPSHOT
[facility] Emergency Department    [address] [facility] [address]    Phone:  [phone]    Fax:  [phone]                                       Maddy Ortiz   MRN: [id number]    Department:  [facility] Emergency Department   Date of Visit:  9/4/2018           Patient Information     Date Of Birth          1985        Your diagnoses for this visit were:     Bacterial conjunctivitis of right eye        You were seen by Jammie Christine APRN CNP.      Follow-up Information     Follow up with [facility]. Schedule an appointment as soon as possible for a visit in 1 day.    Contact information:    [address] [facility] [address] [phone]        Discharge Instructions       Stop the polytrim eye drop.  Start ofloxacin 0.3% eye drops 2 drops to right eye every 4 hours.  Make appointment for recheck with [facility] for recheck in 1-2 days.  [phone]  Return to the emergency department for fever, increased eye pain, increased redness, or increased swelling.    Your next 10 appointments already scheduled     Sep 12, 2018 10:45 AM CDT   Nurse Only with ALLERGY [facility] ([facility])    [address] [facility] [address] [phone]           Every allergy patient MUST wait 30 minutes after their allergy shot. No exceptions.  Xolair shots #1-3 should plan to wait 2 hours in clinic Xolair shots after #4 should plan 30 minute wait in clinic            Oct 15, 2018 10:45 AM CDT   (Arrive by 10:30 AM)   Return Weight Management Visit with Maldonado Michele MD   [facility] ([facility])    [address] [place] [address]   [phone]            Nov 05, 2018 11:00 AM CST   (Arrive by 10:45 AM)   New Patient Visit with Maldonado Michele MD   [facility] ([facility])     [address] [place] [id number] [phone]              24 Hour Appointment Hotline       To make an appointment at any [facility], call [phone] ([phone]). If you don't have a family doctor or clinic, we will help you find one. [place] clinics are conveniently located to serve the needs of you and your family.             Review of your medicines      START taking        Dose / Directions Last dose taken    ofloxacin 0.3 % ophthalmic solution   Commonly known as:  OCUFLOX   Dose:  2 drop   Quantity:  1 Bottle        Place 2 drops into the right eye every 4 hours   Refills:  0          Our records show that you are taking the medicines listed below. If these are incorrect, please call your family doctor or clinic.        Dose / Directions Last dose taken    cetirizine 10 MG tablet   Commonly known as:  zyrTEC   Dose:  10 mg        Take 10 mg by mouth 2 times daily as needed for allergies   Refills:  0        EPINEPHrine 0.3 MG/0.3ML injection 2-pack   Commonly known as:  EPIPEN 2-ODETTE   Dose:  0.3 mg   Quantity:  0.6 mL        Inject 0.3 mLs (0.3 mg) into the muscle once as needed for anaphylaxis   Refills:  3        KLONOPIN PO   Dose:  1 mg        Take 1 mg by mouth daily as needed for anxiety   Refills:  0        levonorgestrel 20 MCG/24HR IUD   Commonly known as:  MIRENA   Dose:  1 each        1 each (20 mcg) by Intrauterine route continuous   Refills:  0        lurasidone 60 MG Tabs tablet   Commonly known as:  LATUDA   Dose:  60 mg   Quantity:  30 tablet        Take 1 tablet (60 mg) by mouth daily   Refills:  0        * ORDER FOR ALLERGEN IMMUNOTHERAPY 5 mL vial   Quantity:  5 mL        Cat Hair, Standardized 10,000 BAU/mL, ALK  3.0 ml Dog Hair Dander, A. P.  1:100 w/v, HS  1.0 ml Dust Mites F 30,000AU/mL, HS  0.5 ml Dust Mites P. 30,000 AU/mL, HS  0.5 ml  Diluent: HSA qs to 5ml   Refills:  PRN        * ORDER FOR ALLERGEN IMMUNOTHERAPY 5 mL vial   Quantity:   5 mL        Alternaria Tenuis 1:10 w/v, HS  0.5 ml Epicoccum Nigrum 1:10 w/v, HS 0.5 ml Hormodendrum Cladosporioides 1:10 w/v, HS 0.5 ml Diluent: HSA qs to 5ml   Refills:  PRN        * ORDER FOR ALLERGEN IMMUNOTHERAPY 5 mL vial   Quantity:  5 mL        Estuardo, White  1:20 w/v, HS  0.5 ml Birch Mix PRW 1:20 w/v, HS  0.5 ml Elm, American 1:20 w/v, HS  0.5 ml Hackberry 1:20 w/v, HS 0.5 ml Hickory, Shagbark 1:20 w/v, HS  0.5 ml [place] Mix RW 1:20 w/v, HS 0.5 ml Oak Mix RVW 1:20 w/v, HS 0.5 ml Banner Tree, Black 1:20 w/v, HS 0.5 ml [place], Black 1:20 w/v, HS 0.5 ml Diluent: HSA qs to 5ml   Refills:  PRN        * ORDER FOR ALLERGEN IMMUNOTHERAPY 5 mL vial   Quantity:  5 mL        Kochia 1:20 w/v, HS 1.0 ml Nettle 1:20 w/v, HS 1.0 ml Plantain, English 1:20 w/v, HS 1.0 ml Ragweed Mixed 1:20 w/v ALK  0.6 ml Russian Thistle 1:20 w/v, HS 1.0 ml Diluent: HSA qs to 5ml   Refills:  PRN        ranitidine 150 MG tablet   Commonly known as:  ZANTAC   Dose:  150 mg        Take 150 mg by mouth At Bedtime   Refills:  0        topiramate 25 MG tablet   Commonly known as:  TOPAMAX   Dose:  75 mg   Quantity:  180 tablet        Take 3 tablets (75 mg) by mouth 2 times daily   Refills:  0        traZODone 50 MG tablet   Commonly known as:  DESYREL   Dose:  50 mg   Quantity:  30 tablet        Take 50 mg by mouth nightly as needed for sleep   Refills:  3        trimethoprim-polymyxin b ophthalmic solution   Commonly known as:  POLYTRIM   Dose:  1 drop   Quantity:  1 Bottle        Apply 1 drop to eye every 3 hours for 7 days   Refills:  0        ziprasidone 80 MG capsule   Commonly known as:  GEODON   Dose:  80 mg   Quantity:  30 capsule        Take 1 capsule (80 mg) by mouth At Bedtime   Refills:  0        * Notice:  This list has 4 medication(s) that are the same as other medications prescribed for you. Read the directions carefully, and ask your doctor or other care provider to review them with you.            Prescriptions were sent or  "printed at these locations (1 Prescription)                   [facility] [place], [place] - [address] [facility] [facility]   [address], [place] [address]    Telephone:  [phone]   Fax:  [phone]   Hours:                  E-Prescribed (1 of 1)         ofloxacin (OCUFLOX) 0.3 % ophthalmic solution                Orders Needing Specimen Collection     None      Pending Results     No orders found from 2018 to 2018.            Pending Culture Results     No orders found from 2018 to 2018.            Pending Results Instructions     If you had any lab results that were not finalized at the time of your Discharge, you can call the ED Lab Result RN at [phone]. You will be contacted by this team for any positive Lab results or changes in treatment. The nurses are available 7 days a week from 10A to 6:30P.  You can leave a message 24 hours per day and they will return your call.        Test Results From Your Hospital Stay               Thank you for choosing [facility]       Thank you for choosing [facility] for your care. Our goal is always to provide you with excellent care. Hearing back from our patients is one way we can continue to improve our services. Please take a few minutes to complete the written survey that you may receive in the mail after you visit with us. Thank you!        [email or web address] Information     AnyPerk lets you send messages to your doctor, view your test results, renew your prescriptions, schedule appointments and more. To sign up, go to [email or web address] . Click on \"Log in\" on the left side of the screen, which will take you to the Welcome page. Then click on \"Sign up Now\" on the right side of the page.     You will be asked to enter the access code listed below, as well as some personal information. Please follow the directions to create your username and password.     Your access code is: [email or web address]  Expires: 12/3/2018  3:17 PM     Your access code will  in " 90 days. If you need help or a new code, please call your [place] clinic or [phone].        Care EveryWhere ID     This is your Care EveryWhere ID. This could be used by other organizations to access your [place] medical records  [id number]        Equal Access to Services     ARMOND MCKEON : Kamryn Ackerman, waaxda luqadaha, qaybta kaalmatruong solano, lincoln fernandez. So [facility] [phone].    ATENCIÓN: Si habla español, tiene a titus disposición servicios gratuitos de asistencia lingüística. Llame al [phone].    We comply with applicable federal civil rights laws and [place] laws. We do not discriminate on the basis of race, color, national origin, age, disability, sex, sexual orientation, or gender identity.            After Visit Summary       This is your record. Keep this with you and show to your community pharmacist(s) and doctor(s) at your next visit.

## 2018-09-04 NOTE — ED PROVIDER NOTES
History     Chief Complaint   Patient presents with     Eye Problem     redness in the right eye started yesterday, started drops at about 2 pm yesterday, no improvement.      HPI  Maddy Ortiz is a 32 year old female who presents to urgent care for evaluation of right eye redness and drainage that started 2 days ago.  Patient states she initially thought she had left a contact lens in her eye, but did not find any foreign body in her eye.  She was exposed to a friend who she was staying overnight with who also had red eye and drainage.  Patient was evaluated at [facility] emergency department yesterday and was diagnosed with a right acute bacterial conjunctivitis and was prescribed Polytrim.  Patient started the eyedrops at 2 PM yesterday.  Today patient states her eye is more red and she has increased drainage.  Denies fever or chills.  Denies pain with eye movement, but I continues to feel rotated and is very watery.  Patient has not put in contact lenses since she was evaluated in the emergency department yesterday.    Problem List:    Patient Active Problem List    Diagnosis Date Noted     Paranoia (psychosis) (H) 08/24/2018     Priority: Medium     Gastroesophageal reflux disease without esophagitis 06/08/2018     Priority: Medium     Schizoaffective disorder, bipolar type (H) 05/07/2018     Priority: Medium     Encounter for IUD insertion 09/15/2017     Priority: Medium     inserted 9/15/17 by Krista Keller MD    LOT: [id number]  Exp: 04/20       Seasonal allergic rhinitis due to pollen 11/04/2016     Priority: Medium     Allergic rhinitis due to mold 11/04/2016     Priority: Medium     Allergic rhinitis due to animal dander 11/04/2016     Priority: Medium     Allergic rhinitis due to dust mite 11/04/2016     Priority: Medium     Depression with anxiety 01/26/2015     Priority: Medium     Insomnia 07/18/2013     Priority: Medium     Bipolar 1 disorder (H) 12/06/2012     Priority: Medium     Planning on  "seeing Purvi (psychiatric nurse)       Paranoid type delusional disorder (H) 11/14/2012     Priority: Medium     Psychosis 10/16/2012     Priority: Medium     Animal dander allergy 05/09/2012     Priority: Medium     Dog and Cat       CARDIOVASCULAR SCREENING; LDL GOAL LESS THAN 160 05/09/2012     Priority: Medium        Past Medical History:    Past Medical History:   Diagnosis Date     Bipolar 1 disorder (H) 12/6/2012     Depressive disorder      Paranoid type delusional disorder (H) 11/14/2012       Past Surgical History:    Past Surgical History:   Procedure Laterality Date     TONSILLECTOMY & ADENOIDECTOMY      as a child       Family History:    Family History   Problem Relation Age of Onset     Adopted: Yes     Unknown/Adopted Mother      Unknown/Adopted Father      Unknown/Adopted Other        Social History:  Marital Status:  Single [1]  Social History   Substance Use Topics     Smoking status: Never Smoker     Smokeless tobacco: Never Used     Alcohol use Yes      Comment: rare wine ( very rare)        Medications:      ofloxacin (OCUFLOX) 0.3 % ophthalmic solution   cetirizine (ZYRTEC) 10 MG tablet   ClonazePAM (KLONOPIN PO)   EPINEPHrine (EPIPEN 2-ODETTE) 0.3 MG/0.3ML injection 2-pack   levonorgestrel (MIRENA) 20 MCG/24HR IUD   lurasidone (LATUDA) 60 MG TABS tablet   ORDER FOR ALLERGEN IMMUNOTHERAPY   ORDER FOR ALLERGEN IMMUNOTHERAPY   ORDER FOR ALLERGEN IMMUNOTHERAPY   ORDER FOR ALLERGEN IMMUNOTHERAPY   ranitidine (ZANTAC) 150 MG tablet   topiramate (TOPAMAX) 25 MG tablet   traZODone (DESYREL) 50 MG tablet   ziprasidone (GEODON) 80 MG capsule         Review of Systems  As mentioned above in the history present illness. All other systems were reviewed and are negative.    Physical Exam   BP: 128/89  Heart Rate: 101  Temp: 97.4  F (36.3  C)  Resp: 18  Height: 160 cm (5' 3\")  Weight: 89.4 kg (197 lb)  SpO2: 96 %      Physical Exam   Constitutional: She appears well-developed and well-nourished. She appears " distressed.   HENT:   Head: Normocephalic and atraumatic.   Right Ear: External ear normal.   Left Ear: External ear normal.   Nose: Nose normal.   Mouth/Throat: Oropharynx is clear and moist.   Eyes: EOM are normal. Pupils are equal, round, and reactive to light. Right eye exhibits discharge. No foreign body present in the right eye. Right conjunctiva is injected. Right conjunctiva has no hemorrhage.   Cardiovascular: Normal rate, regular rhythm and normal heart sounds.    Pulmonary/Chest: Effort normal and breath sounds normal.       ED Course     ED Course     Procedures             No results found for this or any previous visit (from the past 24 hour(s)).    Medications - No data to display    Assessments & Plan (with Medical Decision Making)   On exam right eye is extremely injected, no periorbital swelling or erythema.  Copious amounts of tearing.  Normal extraocular eye movement.  No pain with eye movement.  There is concern for possible reaction to the Polytrim; however, I was not the one who examined her I initially and cannot exclude worsening bacterial conjunctivitis.  She was instructed to stop the Polytrim and was changed to ofloxacin.  Patient instructed to urgently follow up with ophthalmology in the next 1-2 days.  [facility]  Patient was given a note for work.  Patient instructed to return for fever, pain with eye movement, or redness or swelling of the eyelid.  I have reviewed the nursing notes.    I have reviewed the findings, diagnosis, plan and need for follow up with the patient.    Discharge Medication List as of 9/4/2018  3:17 PM      START taking these medications    Details   ofloxacin (OCUFLOX) 0.3 % ophthalmic solution Place 2 drops into the right eye every 4 hours, Disp-1 Bottle, R-0, E-Prescribe             Final diagnoses:   Bacterial conjunctivitis of right eye       9/4/2018   [facility] EMERGENCY DEPARTMENT     Jammie Christine  PHILL Matute CNP  09/04/18 [id number]

## 2018-09-11 ENCOUNTER — TELEPHONE (OUTPATIENT)
Dept: ALLERGY | Facility: CLINIC | Age: 33
End: 2018-09-11

## 2018-09-11 NOTE — TELEPHONE ENCOUNTER
Left a message for patient to let her know that her appt. She scheduled on 9/12/18 is too early for her next injection. Patient can come in Sept. 21 thru Oct 5th, patient is now at her top dose and needs to schedule appt. 21-35 days.    Simona Delacruz RN

## 2018-09-12 ENCOUNTER — TELEPHONE (OUTPATIENT)
Dept: OBGYN | Facility: CLINIC | Age: 33
End: 2018-09-12

## 2018-09-19 ENCOUNTER — TELEPHONE (OUTPATIENT)
Dept: ALLERGY | Facility: CLINIC | Age: 33
End: 2018-09-19

## 2018-09-20 ENCOUNTER — PATIENT OUTREACH (OUTPATIENT)
Dept: CARE COORDINATION | Facility: CLINIC | Age: 33
End: 2018-09-20

## 2018-09-20 NOTE — PROGRESS NOTES
"Clinic Care Coordination Contact  Care Team Conversations Social Work     Follow -up call placed to patient. She was vague with answers, Stated doing OK . Has been able to meet with therapist and Psychiatrist.  \"Keeping regular appointments.\" As this writer started to ask for questions, Pt said she had to take another call    No real assessment completed.     Plan: SW will follow up with pt again in 3 weeks.    Rosemary Beatty [facility]  , Clinic Care Coordination  Clinics:  [facility] [place], [place]  [phone]   9/20/2018   3:14 PM    "

## 2018-09-27 ENCOUNTER — ALLIED HEALTH/NURSE VISIT (OUTPATIENT)
Dept: ALLERGY | Facility: CLINIC | Age: 33
End: 2018-09-27
Payer: MEDICAID

## 2018-09-27 DIAGNOSIS — J30.9 ALLERGIC RHINITIS: Primary | ICD-10-CM

## 2018-09-27 PROCEDURE — 95117 IMMUNOTHERAPY INJECTIONS: CPT

## 2018-09-27 PROCEDURE — 99207 ZZC DROP WITH A PROCEDURE: CPT

## 2018-09-27 NOTE — PROGRESS NOTES
Patient presented after waiting 30 minutes with no reaction to  injections. Discharged from clinic.  Bradford Roe RN

## 2018-09-27 NOTE — MR AVS SNAPSHOT
"              After Visit Summary   9/27/2018    Maddy Ortiz    MRN: [id number]           Patient Information     Date Of Birth          1985        Visit Information        Provider Department      9/27/2018 1:00 PM ALLERGY [facility]        Today's Diagnoses     Allergic rhinitis    -  1       Follow-ups after your visit        Your next 10 appointments already scheduled     Oct 15, 2018 10:45 AM CDT   (Arrive by 10:30 AM)   Return Weight Management Visit with Maldonado Michele MD   [facility] ([facility])    [address] [place] [id number] [phone]            Nov 05, 2018 11:00 AM CST   (Arrive by 10:45 AM)   New Patient Visit with Maldonado Michele MD   [facility] ([facility])    [address] [place] [id number] [phone]              Who to contact     If you have questions or need follow up information about today's clinic visit or your schedule please contact [facility] directly at [phone].  Normal or non-critical lab and imaging results will be communicated to you by [email or web address], letter or phone within 4 business days after the clinic has received the results. If you do not hear from us within 7 days, please contact the clinic through [email or web address] or phone. If you have a critical or abnormal lab result, we will notify you by phone as soon as possible.  Submit refill requests through Opp.io or call your pharmacy and they will forward the refill request to us. Please allow 3 business days for your refill to be completed.          Additional Information About Your Visit        Panorama9hart Information     Opp.io lets you send messages to your doctor, view your test results, renew your prescriptions, schedule appointments and more. To sign up, go to [email or web address] . Click on \"Log in\" on " "the left side of the screen, which will take you to the Welcome page. Then click on \"Sign up Now\" on the right side of the page.     You will be asked to enter the access code listed below, as well as some personal information. Please follow the directions to create your username and password.     Your access code is: [email or web address]  Expires: 12/3/2018  3:17 PM     Your access code will  in 90 days. If you need help or a new code, please call your [place] clinic or [phone].        Care EveryWhere ID     This is your Care EveryWhere ID. This could be used by other organizations to access your [place] medical records  [id number]         Blood Pressure from Last 3 Encounters:   18 128/89   18 (!) 135/99   18 (!) 133/95    Weight from Last 3 Encounters:   18 89.4 kg (197 lb)   18 89.4 kg (197 lb)   18 90.7 kg (200 lb)              We Performed the Following     Allergy Shot: Two or more injections        Primary Care Provider Office Phone # Fax #    Shirley Andrade Elaina Freeman, PHILL [facility] [phone] [address] [facility] [address]        Goals        General    Medication 1 (pt-stated)     Notes - Note created  2018  4:26 PM by Padmini Beatty LSW    Goal Statement: I need to get my medication.  Measure of Success: will have medication  Supportive Steps to Achieve: Verified MA is active  Barriers:   Strengths: pt called ins, will go to pharmacy to get meds  Date to Achieve By: 18  Patient expressed understanding of goal: fair        Mental Health Management (pt-stated)     Notes - Note created  2018  9:12 AM by Padmini Beatty LSW    Goal Statement: I will follow up with my therapist and Psychiatrist  Measure of Success: will keep scheduled appts  Supportive Steps to Achieve: has appts  Barriers:   Strengths: has established supports   Date to Achieve By:.   Patient expressed understanding of goal: yes          Equal " Access to Services     [facility]: Hadii aad ku hadronaldmonica Tyron, waaxda luqadaha, qaybta kaalmatruong solano, lincoln fernandez. So [facility] [phone].    ATENCIÓN: Si colleenla brett, tiene a titus disposición servicios gratuitos de asistencia lingüística. Llame al [phone].    We comply with applicable federal civil rights laws and [place] laws. We do not discriminate on the basis of race, color, national origin, age, disability, sex, sexual orientation, or gender identity.            Thank you!     Thank you for choosing [facility]  for your care. Our goal is always to provide you with excellent care. Hearing back from our patients is one way we can continue to improve our services. Please take a few minutes to complete the written survey that you may receive in the mail after your visit with us. Thank you!             Your Updated Medication List - Protect others around you: Learn how to safely use, store and throw away your medicines at www.disposemymeds.org.          This list is accurate as of 9/27/18  1:47 PM.  Always use your most recent med list.                   Brand Name Dispense Instructions for use Diagnosis    cetirizine 10 MG tablet    zyrTEC     Take 10 mg by mouth 2 times daily as needed for allergies        EPINEPHrine 0.3 MG/0.3ML injection 2-pack    EPIPEN 2-ODETTE    0.6 mL    Inject 0.3 mLs (0.3 mg) into the muscle once as needed for anaphylaxis    Need for desensitization to allergens       KLONOPIN PO      Take 1 mg by mouth daily as needed for anxiety        levonorgestrel 20 MCG/24HR IUD    MIRENA     1 each (20 mcg) by Intrauterine route continuous    Encounter for insertion of mirena IUD       lurasidone 60 MG Tabs tablet    LATUDA    30 tablet    Take 1 tablet (60 mg) by mouth daily    Paranoia (psychosis) (H)       ofloxacin 0.3 % ophthalmic solution    OCUFLOX    1 Bottle    Place 2 drops into the right eye every 4 hours        * ORDER FOR  ALLERGEN IMMUNOTHERAPY 5 mL vial     5 mL    Cat Hair, Standardized 10,000 BAU/mL, ALK  3.0 ml Dog Hair Dander, A. P.  1:100 w/v, HS  1.0 ml Dust Mites F 30,000AU/mL, HS  0.5 ml Dust Mites P. 30,000 AU/mL, HS  0.5 ml  Diluent: HSA qs to 5ml    Allergic rhinitis due to animal dander, Allergic rhinitis due to dust mite       * ORDER FOR ALLERGEN IMMUNOTHERAPY 5 mL vial     5 mL    Alternaria Tenuis 1:10 w/v, HS  0.5 ml Epicoccum Nigrum 1:10 w/v, HS 0.5 ml Hormodendrum Cladosporioides 1:10 w/v, HS 0.5 ml Diluent: HSA qs to 5ml    Allergic rhinitis due to mold       * ORDER FOR ALLERGEN IMMUNOTHERAPY 5 mL vial     5 mL    Estuardo, White  1:20 w/v, HS  0.5 ml Birch Mix PRW 1:20 w/v, HS  0.5 ml Elm, American 1:20 w/v, HS  0.5 ml Hackberry 1:20 w/v, HS 0.5 ml Hickory, Shagbark 1:20 w/v, HS  0.5 ml [place] Mix RW 1:20 w/v, HS 0.5 ml Oak Mix RVW 1:20 w/v, HS 0.5 ml [place] Tree, Black 1:20 w/v, HS 0.5 ml Strong, Black 1:20 w/v, HS 0.5 ml Diluent: HSA qs to 5ml    Chronic seasonal allergic rhinitis due to pollen       * ORDER FOR ALLERGEN IMMUNOTHERAPY 5 mL vial     5 mL    Kochia 1:20 w/v, HS 1.0 ml Nettle 1:20 w/v, HS 1.0 ml Plantain, English 1:20 w/v, HS 1.0 ml Ragweed Mixed 1:20 w/v ALK  0.6 ml Russian Thistle 1:20 w/v, HS 1.0 ml Diluent: HSA qs to 5ml    Chronic seasonal allergic rhinitis due to pollen       ranitidine 150 MG tablet    ZANTAC     Take 150 mg by mouth At Bedtime        topiramate 25 MG tablet    TOPAMAX    180 tablet    Take 3 tablets (75 mg) by mouth 2 times daily    Schizoaffective disorder, bipolar type (H)       traZODone 50 MG tablet    DESYREL    30 tablet    Take 50 mg by mouth nightly as needed for sleep    Insomnia, unspecified type       ziprasidone 80 MG capsule    GEODON    30 capsule    Take 1 capsule (80 mg) by mouth At Bedtime    Paranoia (psychosis) (H)       * Notice:  This list has 4 medication(s) that are the same as other medications prescribed for you. Read the directions carefully, and  ask your doctor or other care provider to review them with you.

## 2018-10-15 ENCOUNTER — OFFICE VISIT (OUTPATIENT)
Dept: ENDOCRINOLOGY | Facility: CLINIC | Age: 33
End: 2018-10-15
Payer: MEDICAID

## 2018-10-15 VITALS
HEIGHT: 63 IN | BODY MASS INDEX: 34.84 KG/M2 | TEMPERATURE: 97.9 F | SYSTOLIC BLOOD PRESSURE: 130 MMHG | HEART RATE: 74 BPM | WEIGHT: 196.6 LBS | DIASTOLIC BLOOD PRESSURE: 91 MMHG | OXYGEN SATURATION: 97 % | RESPIRATION RATE: 18 BRPM

## 2018-10-15 DIAGNOSIS — F25.0 SCHIZOAFFECTIVE DISORDER, BIPOLAR TYPE (H): ICD-10-CM

## 2018-10-15 RX ORDER — TOPIRAMATE 25 MG/1
50 TABLET, FILM COATED ORAL
Qty: 180 TABLET | Refills: 5 | Status: SHIPPED | OUTPATIENT
Start: 2018-10-15 | End: 2019-03-13

## 2018-10-15 RX ORDER — LURASIDONE HYDROCHLORIDE 80 MG/1
80 TABLET, FILM COATED ORAL 2 TIMES DAILY
COMMUNITY
Start: 2018-09-19 | End: 2020-08-13

## 2018-10-15 ASSESSMENT — PAIN SCALES - GENERAL: PAINLEVEL: NO PAIN (0)

## 2018-10-15 NOTE — MR AVS SNAPSHOT
"              After Visit Summary   10/15/2018    Maddy Ortiz    MRN: [id number]           Patient Information     Date Of Birth          1985        Visit Information        Provider Department      10/15/2018 10:45 AM Maldonado Michele MD [facility]        Today's Diagnoses     Schizoaffective disorder, bipolar type (H)           Follow-ups after your visit        Follow-up notes from your care team     Return in about 3 months (around 1/15/2019).      Your next 10 appointments already scheduled     Nov 02, 2018  1:00 PM CDT   Nurse Only with ALLERGY [facility] ([facility])    [address] [facility] [address]   [phone]           Every allergy patient MUST wait 30 minutes after their allergy shot. No exceptions.  Xolair shots #1-3 should plan to wait 2 hours in clinic Xolair shots after #4 should plan 30 minute wait in clinic            Nov 05, 2018 11:00 AM CST   (Arrive by 10:45 AM)   New Patient Visit with MD JAMAL [facility] ([facility])    [address] [place] [id number] [phone]              Who to contact     Please call your clinic at [phone] to:    Ask questions about your health    Make or cancel appointments    Discuss your medicines    Learn about your test results    Speak to your doctor            Additional Information About Your Visit        Care EveryWhere ID     This is your Care EveryWhere ID. This could be used by other organizations to access your [place] medical records  [id number]        Your Vitals Were     Pulse Temperature Respirations Height Pulse Oximetry BMI (Body Mass Index)    74 97.9  F (36.6  C) (Oral) 18 1.6 m (5' 3\") 97% 34.83 kg/m2       Blood Pressure from Last 3 Encounters:   10/15/18 (!) 130/91   09/04/18 128/89   09/03/18 (!) 135/99    Weight from Last 3 Encounters: "   10/15/18 89.2 kg (196 lb 9.6 oz)   09/04/18 89.4 kg (197 lb)   09/03/18 89.4 kg (197 lb)              Today, you had the following     No orders found for display         Today's Medication Changes          These changes are accurate as of 10/15/18 11:21 AM.  If you have any questions, ask your nurse or doctor.               These medicines have changed or have updated prescriptions.        Dose/Directions    topiramate 25 MG tablet   Commonly known as:  TOPAMAX   This may have changed:  how much to take   Used for:  Schizoaffective disorder, bipolar type (H)   Changed by:  Maldonado Michele MD        Dose:  50 mg   Take 2 tablets (50 mg) by mouth 2 times daily   Quantity:  180 tablet   Refills:  5            Where to get your medicines      These medications were sent to [facility] [address]  [address] [facility] [address]     Phone:  [phone]     topiramate 25 MG tablet                Primary Care Provider Office Phone # Fax #    Shirley Andrade PHILL [facility] [phone]       [address] [facility] [address]        Goals        General    Medication 1 (pt-stated)     Notes - Note created  8/30/2018  4:26 PM by Padmini Beatty LSW    Goal Statement: I need to get my medication.  Measure of Success: will have medication  Supportive Steps to Achieve: Verified MA is active  Barriers:   Strengths: pt called ins, will go to pharmacy to get meds  Date to Achieve By: 9/1/18  Patient expressed understanding of goal: fair        Mental Health Management (pt-stated)     Notes - Note created  8/31/2018  9:12 AM by Padmini Beatty LSW    Goal Statement: I will follow up with my therapist and Psychiatrist  Measure of Success: will keep scheduled appts  Supportive Steps to Achieve: has appts  Barriers:   Strengths: has established supports   Date to Achieve By:Sept 15th.   Patient expressed understanding of goal: yes          Equal  Access to Services     [facility]: Hadii linda castro verenice Ackerman, waaxda luqadaha, qaybta kaalpauline rashidceceliatruong, waxhannah paulino florestrishchele fernandez. So [facility] [phone].    ATENCIÓN: Si colleenla brett, tiene a titus disposición servicios gratuitos de asistencia lingüística. Llame al [phone].    We comply with applicable federal civil rights laws and [place] laws. We do not discriminate on the basis of race, color, national origin, age, disability, sex, sexual orientation, or gender identity.            Thank you!     Thank you for choosing [facility]  for your care. Our goal is always to provide you with excellent care. Hearing back from our patients is one way we can continue to improve our services. Please take a few minutes to complete the written survey that you may receive in the mail after your visit with us. Thank you!             Your Updated Medication List - Protect others around you: Learn how to safely use, store and throw away your medicines at www.disposemymeds.org.          This list is accurate as of 10/15/18 11:21 AM.  Always use your most recent med list.                   Brand Name Dispense Instructions for use Diagnosis    cetirizine 10 MG tablet    zyrTEC     Take 10 mg by mouth 2 times daily as needed for allergies        EPINEPHrine 0.3 MG/0.3ML injection 2-pack    EPIPEN 2-ODETTE    0.6 mL    Inject 0.3 mLs (0.3 mg) into the muscle once as needed for anaphylaxis    Need for desensitization to allergens       KLONOPIN PO      Take 1 mg by mouth daily as needed for anxiety        levonorgestrel 20 MCG/24HR IUD    MIRENA     1 each (20 mcg) by Intrauterine route continuous    Encounter for insertion of mirena IUD       * lurasidone 60 MG Tabs tablet    LATUDA    30 tablet    Take 1 tablet (60 mg) by mouth daily    Paranoia (psychosis) (H)       * LATUDA 80 MG Tabs tablet   Generic drug:  lurasidone           ofloxacin 0.3 % ophthalmic solution    OCUFLOX    1  Bottle    Place 2 drops into the right eye every 4 hours        * ORDER FOR ALLERGEN IMMUNOTHERAPY 5 mL vial     5 mL    Cat Hair, Standardized 10,000 BAU/mL, ALK  3.0 ml Dog Hair Dander, A. P.  1:100 w/v, HS  1.0 ml Dust Mites F 30,000AU/mL, HS  0.5 ml Dust Mites P. 30,000 AU/mL, HS  0.5 ml  Diluent: HSA qs to 5ml    Allergic rhinitis due to animal dander, Allergic rhinitis due to dust mite       * ORDER FOR ALLERGEN IMMUNOTHERAPY 5 mL vial     5 mL    Alternaria Tenuis 1:10 w/v, HS  0.5 ml Epicoccum Nigrum 1:10 w/v, HS 0.5 ml Hormodendrum Cladosporioides 1:10 w/v, HS 0.5 ml Diluent: HSA qs to 5ml    Allergic rhinitis due to mold       * ORDER FOR ALLERGEN IMMUNOTHERAPY 5 mL vial     5 mL    Estuardo, White  1:20 w/v, HS  0.5 ml Birch Mix PRW 1:20 w/v, HS  0.5 ml Elm, American 1:20 w/v, HS  0.5 ml Hackberry 1:20 w/v, HS 0.5 ml Hickory, Shagbark 1:20 w/v, HS  0.5 ml [place] Mix RW 1:20 w/v, HS 0.5 ml Oak Mix RVW 1:20 w/v, HS 0.5 ml [place] Tree, Black 1:20 w/v, HS 0.5 ml [place], Black 1:20 w/v, HS 0.5 ml Diluent: HSA qs to 5ml    Chronic seasonal allergic rhinitis due to pollen       * ORDER FOR ALLERGEN IMMUNOTHERAPY 5 mL vial     5 mL    Kochia 1:20 w/v, HS 1.0 ml Nettle 1:20 w/v, HS 1.0 ml Plantain, English 1:20 w/v, HS 1.0 ml Ragweed Mixed 1:20 w/v ALK  0.6 ml Russian Thistle 1:20 w/v, HS 1.0 ml Diluent: HSA qs to 5ml    Chronic seasonal allergic rhinitis due to pollen       ranitidine 150 MG tablet    ZANTAC     Take 150 mg by mouth At Bedtime        topiramate 25 MG tablet    TOPAMAX    180 tablet    Take 2 tablets (50 mg) by mouth 2 times daily    Schizoaffective disorder, bipolar type (H)       traZODone 50 MG tablet    DESYREL    30 tablet    Take 50 mg by mouth nightly as needed for sleep    Insomnia, unspecified type       ziprasidone 80 MG capsule    GEODON    30 capsule    Take 1 capsule (80 mg) by mouth At Bedtime    Paranoia (psychosis) (H)       * Notice:  This list has 6 medication(s) that are the same  as other medications prescribed for you. Read the directions carefully, and ask your doctor or other care provider to review them with you.

## 2018-10-15 NOTE — LETTER
"10/15/2018     RE: Maddy Ortiz  [address]  [place] [facility] [address]     Dear Colleague,    Thank you for referring your patient, Maddy Ortiz, to the [facility] at [facility]. Please see a copy of my visit note below.    Return Medical Weight Management Note     Maddy Ortiz  MRN:  [id number]  :  1985  GREGORY:  10/15/2018    Dear Shirley Freeman, PHILL CNP,    I had the pleasure of seeing your patient Maddy Ortiz.  She is a 32 year old female who I am continuing to see for treatment of obesity related to:       2018   I have the following co-morbidities associated with obesity: GERD (Reflux), Asthma, Stress Incontinence       CURRENT WEIGHT:   196 lbs 9.6 oz    Wt Readings from Last 4 Encounters:   18 89.4 kg (197 lb)   18 89.4 kg (197 lb)   18 90.7 kg (200 lb)   18 90.5 kg (199 lb 8 oz)       Height:  5' 3\"  Body Mass Index:  Body mass index is 34.83 kg/(m^2).    Initial consult weight was 198 on 2018.  Weight change since last seen on 2018 is down 2 pounds.   Total loss is 2 pounds.    INTERVAL HISTORY:  No weight loss though reports food changes. Was hospitalized for psychosis and may have intended on discharge for her to be taking topiramate 75 BID, but she is taking 75 HS.    Diet and Activity Changes Since Last Visit Reviewed With Patient 10/15/2018   I have made the following changes to my diet since my last visit: soup no pizza   With regards to my diet, I am still struggling with: none   I have made the following changes to my activity/exercise since my last visit: walking   With regards to my activity/exercise, I am still struggling with: tiredness       MEDICATIONS:   Current Outpatient Prescriptions   Medication     cetirizine (ZYRTEC) 10 MG tablet     ClonazePAM (KLONOPIN PO)     EPINEPHrine (EPIPEN 2-ODETTE) 0.3 MG/0.3ML injection 2-pack     levonorgestrel (MIRENA) 20 MCG/24HR IUD "     lurasidone (LATUDA) 60 MG TABS tablet     ofloxacin (OCUFLOX) 0.3 % ophthalmic solution     ORDER FOR ALLERGEN IMMUNOTHERAPY     ORDER FOR ALLERGEN IMMUNOTHERAPY     ORDER FOR ALLERGEN IMMUNOTHERAPY     ORDER FOR ALLERGEN IMMUNOTHERAPY     ranitidine (ZANTAC) 150 MG tablet     topiramate (TOPAMAX) 25 MG tablet     traZODone (DESYREL) 50 MG tablet     ziprasidone (GEODON) 80 MG capsule     No current facility-administered medications for this visit.        Weight Loss Medication History Reviewed With Patient 10/15/2018   Which weight loss medications are you currently taking on a regular basis?  Topamax (topiramate)   If you are not taking a weight loss medication that was prescribed to you, please indicate why: Other   Are you having any side effects from the weight loss medication that we have prescribed you? No       ASSESSMENT:   Will increase topiramate to 50 bid and reinforce food plan - focus on no between meal snacking, aggressive lowering of starches and cheese    FOLLOW-UP:    12 weeks.  I spent 15 minutes with this patient face to face and explained the conditions and plans (more than 50% of time was counseling/coordination of weight management).    Sincerely,    Maldonado Michele MD

## 2018-10-15 NOTE — PROGRESS NOTES
"    Return Medical Weight Management Note     Maddy Ortiz  MRN:  [id number]  :  1985  GREGORY:  10/15/2018    Dear Shirley Freeman, PHILL ESPINOZA,    I had the pleasure of seeing your patient Maddy Ortiz.  She is a 32 year old female who I am continuing to see for treatment of obesity related to:       2018   I have the following co-morbidities associated with obesity: GERD (Reflux), Asthma, Stress Incontinence       CURRENT WEIGHT:   196 lbs 9.6 oz    Wt Readings from Last 4 Encounters:   18 89.4 kg (197 lb)   18 89.4 kg (197 lb)   18 90.7 kg (200 lb)   18 90.5 kg (199 lb 8 oz)       Height:  5' 3\"  Body Mass Index:  Body mass index is 34.83 kg/(m^2).    Initial consult weight was 198 on 2018.  Weight change since last seen on 2018 is down 2 pounds.   Total loss is 2 pounds.    INTERVAL HISTORY:  No weight loss though reports food changes. Was hospitalized for psychosis and may have intended on discharge for her to be taking topiramate 75 BID, but she is taking 75 HS.    Diet and Activity Changes Since Last Visit Reviewed With Patient 10/15/2018   I have made the following changes to my diet since my last visit: soup no pizza   With regards to my diet, I am still struggling with: none   I have made the following changes to my activity/exercise since my last visit: walking   With regards to my activity/exercise, I am still struggling with: tiredness       MEDICATIONS:   Current Outpatient Prescriptions   Medication     cetirizine (ZYRTEC) 10 MG tablet     ClonazePAM (KLONOPIN PO)     EPINEPHrine (EPIPEN 2-ODETTE) 0.3 MG/0.3ML injection 2-pack     levonorgestrel (MIRENA) 20 MCG/24HR IUD     lurasidone (LATUDA) 60 MG TABS tablet     ofloxacin (OCUFLOX) 0.3 % ophthalmic solution     ORDER FOR ALLERGEN IMMUNOTHERAPY     ORDER FOR ALLERGEN IMMUNOTHERAPY     ORDER FOR ALLERGEN IMMUNOTHERAPY     ORDER FOR ALLERGEN IMMUNOTHERAPY     ranitidine (ZANTAC) 150 MG tablet     topiramate " (TOPAMAX) 25 MG tablet     traZODone (DESYREL) 50 MG tablet     ziprasidone (GEODON) 80 MG capsule     No current facility-administered medications for this visit.        Weight Loss Medication History Reviewed With Patient 10/15/2018   Which weight loss medications are you currently taking on a regular basis?  Topamax (topiramate)   If you are not taking a weight loss medication that was prescribed to you, please indicate why: Other   Are you having any side effects from the weight loss medication that we have prescribed you? No       ASSESSMENT:   Will increase topiramate to 50 bid and reinforce food plan - focus on no between meal snacking, aggressive lowering of starches and cheese    FOLLOW-UP:    12 weeks.  I spent 15 minutes with this patient face to face and explained the conditions and plans (more than 50% of time was counseling/coordination of weight management).    Sincerely,    Maldonado Michele MD

## 2018-10-15 NOTE — NURSING NOTE
"Chief Complaint   Patient presents with     Weight Problem     Pt here for weight management follow up.       Vitals:    10/15/18 1050   BP: (!) 130/91   BP Location: Left arm   Patient Position: Chair   Cuff Size: Adult Regular   Pulse: 74   Resp: 18   Temp: 97.9  F (36.6  C)   TempSrc: Oral   SpO2: 97%   Weight: 196 lb 9.6 oz   Height: 5' 3\"       Body mass index is 34.83 kg/(m^2).      BRYN Gaines, EMT                      "

## 2018-10-18 ENCOUNTER — PATIENT OUTREACH (OUTPATIENT)
Dept: CARE COORDINATION | Facility: CLINIC | Age: 33
End: 2018-10-18

## 2018-10-18 NOTE — PROGRESS NOTES
"Clinic Care Coordination Contact  Care Team Conversations Social Work     Follow -up call placed to patient. Pt  was again vague and in a hurry. 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 Time to re -certify for Medical Assistance.  She plans to take paperwork to the [facility] today. Encouraged her not to be late or her MA will end and she will need to reapply.   When I asked about her MH appointments, she said she was\" real busy and had to go. \"  She has been connected with Mental Health supports.     Plan: Pt will continue with MH supports.  SW will follow up with pt in 4-6 weeks.    Rosemary Beatty [facility]  , Clinic Care Coordination  Clinics:  [facility] [place], [place]  [phone]   10/18/2018   4:14 PM        " normal...

## 2018-11-05 ENCOUNTER — ALLIED HEALTH/NURSE VISIT (OUTPATIENT)
Dept: ALLERGY | Facility: CLINIC | Age: 33
End: 2018-11-05
Payer: MEDICAID

## 2018-11-05 ENCOUNTER — TELEPHONE (OUTPATIENT)
Dept: ALLERGY | Facility: CLINIC | Age: 33
End: 2018-11-05

## 2018-11-05 DIAGNOSIS — Z53.9 ERRONEOUS ENCOUNTER--DISREGARD: Primary | ICD-10-CM

## 2018-11-05 NOTE — MR AVS SNAPSHOT
After Visit Summary   11/5/2018    Maddy Ortiz    MRN: [id number]           Patient Information     Date Of Birth          1985        Visit Information        Provider Department      11/5/2018 1:30 PM ALLERGY [facility]        Today's Diagnoses     ERRONEOUS ENCOUNTER--DISREGARD    -  1       Follow-ups after your visit        Your next 10 appointments already scheduled     Nov 09, 2018  1:00 PM CST   Nurse Only with ALLERGY [facility] ([facility])    [address] [address] [phone]           Every allergy patient MUST wait 30 minutes after their allergy shot. No exceptions.  Xolair shots #1-3 should plan to wait 2 hours in clinic Xolair shots after #4 should plan 30 minute wait in clinic              Who to contact     If you have questions or need follow up information about today's clinic visit or your schedule please contact [facility] directly at [phone].  Normal or non-critical lab and imaging results will be communicated to you by Shuropodyhart, letter or phone within 4 business days after the clinic has received the results. If you do not hear from us within 7 days, please contact the clinic through Shuropodyhart or phone. If you have a critical or abnormal lab result, we will notify you by phone as soon as possible.  Submit refill requests through [employer] or call your pharmacy and they will forward the refill request to us. Please allow 3 business days for your refill to be completed.          Additional Information About Your Visit        Care EveryWhere ID     This is your Care EveryWhere ID. This could be used by other organizations to access your [place] medical records  [id number]         Blood Pressure from Last 3 Encounters:   10/15/18 (!) 130/91   09/04/18 128/89   09/03/18 (!) 135/99    Weight from Last 3 Encounters:   10/15/18 89.2 kg (196 lb 9.6 oz)   09/04/18 89.4  kg (197 lb)   09/03/18 89.4 kg (197 lb)              Today, you had the following     No orders found for display       Primary Care Provider Office Phone # Fax #    PHILL Rodriguez [facility] [phone] [address] [address]        Goals        General    Medication 1 (pt-stated)     Notes - Note created  8/30/2018  4:26 PM by Padmini Beatty LSW    Goal Statement: I need to get my medication.  Measure of Success: will have medication  Supportive Steps to Achieve: Verified MA is active  Barriers:   Strengths: pt called ins, will go to pharmacy to get meds  Date to Achieve By: 9/1/18  Patient expressed understanding of goal: fair        Mental Health Management (pt-stated)     Notes - Note created  8/31/2018  9:12 AM by Padmini Beatty LSW    Goal Statement: I will follow up with my therapist and Psychiatrist  Measure of Success: will keep scheduled appts  Supportive Steps to Achieve: has appts  Barriers:   Strengths: has established supports   Date to Achieve By:Sept 15th.   Patient expressed understanding of goal: yes          Equal Access to Services     [facility]: Hadii linda ku hadasho Soradha, waaxda luqadaha, qaybta kaalmada russ, lincoln maciel . So [facility] [phone].    ATENCIÓN: Si habla español, tiene a titus disposición servicios gratuitos de asistencia lingüística. Llame al [phone].    We comply with applicable federal civil rights laws and [place] laws. We do not discriminate on the basis of race, color, national origin, age, disability, sex, sexual orientation, or gender identity.            Thank you!     Thank you for choosing [facility]  for your care. Our goal is always to provide you with excellent care. Hearing back from our patients is one way we can continue to improve our services. Please take a few minutes to complete the written survey that you may receive in the mail after your  visit with us. Thank you!             Your Updated Medication List - Protect others around you: Learn how to safely use, store and throw away your medicines at www.disposemymeds.org.          This list is accurate as of 11/5/18  2:27 PM.  Always use your most recent med list.                   Brand Name Dispense Instructions for use Diagnosis    cetirizine 10 MG tablet    zyrTEC     Take 10 mg by mouth 2 times daily as needed for allergies        EPINEPHrine 0.3 MG/0.3ML injection 2-pack    EPIPEN 2-ODETTE    0.6 mL    Inject 0.3 mLs (0.3 mg) into the muscle once as needed for anaphylaxis    Need for desensitization to allergens       KLONOPIN PO      Take 1 mg by mouth daily as needed for anxiety        levonorgestrel 20 MCG/24HR IUD    MIRENA     1 each (20 mcg) by Intrauterine route continuous    Encounter for insertion of mirena IUD       * lurasidone 60 MG Tabs tablet    LATUDA    30 tablet    Take 1 tablet (60 mg) by mouth daily    Paranoia (psychosis) (H)       * LATUDA 80 MG Tabs tablet   Generic drug:  lurasidone           ofloxacin 0.3 % ophthalmic solution    OCUFLOX    1 Bottle    Place 2 drops into the right eye every 4 hours        * ORDER FOR ALLERGEN IMMUNOTHERAPY 5 mL vial     5 mL    Cat Hair, Standardized 10,000 BAU/mL, ALK  3.0 ml Dog Hair Dander, A. P.  1:100 w/v, HS  1.0 ml Dust Mites F 30,000AU/mL, HS  0.5 ml Dust Mites P. 30,000 AU/mL, HS  0.5 ml  Diluent: HSA qs to 5ml    Allergic rhinitis due to animal dander, Allergic rhinitis due to dust mite       * ORDER FOR ALLERGEN IMMUNOTHERAPY 5 mL vial     5 mL    Alternaria Tenuis 1:10 w/v, HS  0.5 ml Epicoccum Nigrum 1:10 w/v, HS 0.5 ml Hormodendrum Cladosporioides 1:10 w/v, HS 0.5 ml Diluent: HSA qs to 5ml    Allergic rhinitis due to mold       * ORDER FOR ALLERGEN IMMUNOTHERAPY 5 mL vial     5 mL    Estuardo, White  1:20 w/v, HS  0.5 ml Birch Mix PRW 1:20 w/v, HS  0.5 ml Elm, American 1:20 w/v, HS  0.5 ml Hackberry 1:20 w/v, HS 0.5 ml Hickory,  Shagbark 1:20 w/v, HS  0.5 ml Luebbering Mix RW 1:20 w/v, HS 0.5 ml Oak Mix RVW 1:20 w/v, HS 0.5 ml Union Tree, Black 1:20 w/v, HS 0.5 ml Byram, Black 1:20 w/v, HS 0.5 ml Diluent: HSA qs to 5ml    Chronic seasonal allergic rhinitis due to pollen       * ORDER FOR ALLERGEN IMMUNOTHERAPY 5 mL vial     5 mL    Kochia 1:20 w/v, HS 1.0 ml Nettle 1:20 w/v, HS 1.0 ml Plantain, English 1:20 w/v, HS 1.0 ml Ragweed Mixed 1:20 w/v ALK  0.6 ml Russian Thistle 1:20 w/v, HS 1.0 ml Diluent: HSA qs to 5ml    Chronic seasonal allergic rhinitis due to pollen       ranitidine 150 MG tablet    ZANTAC     Take 150 mg by mouth At Bedtime        topiramate 25 MG tablet    TOPAMAX    180 tablet    Take 2 tablets (50 mg) by mouth 2 times daily    Schizoaffective disorder, bipolar type (H)       traZODone 50 MG tablet    DESYREL    30 tablet    Take 50 mg by mouth nightly as needed for sleep    Insomnia, unspecified type       ziprasidone 80 MG capsule    GEODON    30 capsule    Take 1 capsule (80 mg) by mouth At Bedtime    Paranoia (psychosis) (H)       * Notice:  This list has 6 medication(s) that are the same as other medications prescribed for you. Read the directions carefully, and ask your doctor or other care provider to review them with you.

## 2018-11-05 NOTE — TELEPHONE ENCOUNTER
Reason for Call:  Other allergy shots    Detailed comments: Pt has questions about scheduling allergy shots, please call    Phone Number Patient can be reached at: Cell number on file:    Telephone Information:   Mobile [phone]       Best Time: today    Can we leave a detailed message on this number? YES    Call taken on 11/5/2018 at 1:32 PM by Reanna Cowan

## 2018-11-06 NOTE — TELEPHONE ENCOUNTER
Pt has appointment for allergy injection on 11/9/2018. Closing encounter.  Callie VELASQUEZ   Allergy RN

## 2018-11-09 ENCOUNTER — TELEPHONE (OUTPATIENT)
Dept: ALLERGY | Facility: CLINIC | Age: 33
End: 2018-11-09

## 2018-11-09 ENCOUNTER — ALLIED HEALTH/NURSE VISIT (OUTPATIENT)
Dept: ALLERGY | Facility: CLINIC | Age: 33
End: 2018-11-09
Payer: MEDICAID

## 2018-11-09 DIAGNOSIS — J30.1 CHRONIC SEASONAL ALLERGIC RHINITIS DUE TO POLLEN: ICD-10-CM

## 2018-11-09 DIAGNOSIS — J30.89 ALLERGIC RHINITIS DUE TO MOLD: ICD-10-CM

## 2018-11-09 DIAGNOSIS — J30.9 ALLERGIC RHINITIS: Primary | ICD-10-CM

## 2018-11-09 DIAGNOSIS — J30.89 ALLERGIC RHINITIS DUE TO DUST MITE: ICD-10-CM

## 2018-11-09 DIAGNOSIS — Z53.9 ERRONEOUS ENCOUNTER--DISREGARD: Primary | ICD-10-CM

## 2018-11-09 DIAGNOSIS — J30.81 ALLERGIC RHINITIS DUE TO ANIMAL DANDER: ICD-10-CM

## 2018-11-09 PROCEDURE — 95117 IMMUNOTHERAPY INJECTIONS: CPT

## 2018-11-09 PROCEDURE — 99207 ZZC DROP WITH A PROCEDURE: CPT

## 2018-11-09 NOTE — TELEPHONE ENCOUNTER
New allergy serum has been ordered for patient. Please advise new vial start dose.    Top Dose: 0.5 RED  Last injection given on 11/09/18.       Vial Color Content  Dose   Red 1:1 Cat, Dog, Dust Mite  0.45     Red 1:1 Molds  0.45     Red 1:1 Trees  0.45     Red 1:1 Weeds  0.45           Signature  Kenney Pena

## 2018-11-09 NOTE — MR AVS SNAPSHOT
After Visit Summary   11/9/2018    Maddy Ortiz    MRN: [id number]           Patient Information     Date Of Birth          1985        Visit Information        Provider Department      11/9/2018 1:00 PM ALLERGY [facility] - [facility]        Today's Diagnoses     Allergic rhinitis    -  1       Follow-ups after your visit        Who to contact     If you have questions or need follow up information about today's clinic visit or your schedule please contact [facility] directly at [phone].  Normal or non-critical lab and imaging results will be communicated to you by MyChart, letter or phone within 4 business days after the clinic has received the results. If you do not hear from us within 7 days, please contact the clinic through MyChart or phone. If you have a critical or abnormal lab result, we will notify you by phone as soon as possible.  Submit refill requests through [employer] or call your pharmacy and they will forward the refill request to us. Please allow 3 business days for your refill to be completed.          Additional Information About Your Visit        Care EveryWhere ID     This is your Care EveryWhere ID. This could be used by other organizations to access your [place] medical records  [id number]         Blood Pressure from Last 3 Encounters:   10/15/18 (!) 130/91   09/04/18 128/89   09/03/18 (!) 135/99    Weight from Last 3 Encounters:   10/15/18 89.2 kg (196 lb 9.6 oz)   09/04/18 89.4 kg (197 lb)   09/03/18 89.4 kg (197 lb)              We Performed the Following     Allergy Shot: Two or more injections        Primary Care Provider Office Phone # Fax #    Shirley PHILL [facility] [phone]       [address] [address]        Goals        General    Medication 1 (pt-stated)     Notes - Note created  8/30/2018  4:26 PM by Padmini Beatty LSW    Goal Statement: I need to get my medication.  Measure  of Success: will have medication  Supportive Steps to Achieve: Verified MA is active  Barriers:   Strengths: pt called ins, will go to pharmacy to get meds  Date to Achieve By: 9/1/18  Patient expressed understanding of goal: fair        Mental Health Management (pt-stated)     Notes - Note created  8/31/2018  9:12 AM by Padmini Beatty LSW    Goal Statement: I will follow up with my therapist and Psychiatrist  Measure of Success: will keep scheduled appts  Supportive Steps to Achieve: has appts  Barriers:   Strengths: has established supports   Date to Achieve By:Sept 15th.   Patient expressed understanding of goal: yes          Equal Access to Services     [facility]: Hadii aad ku hadasho Soomaali, waaxda luqadaha, qaybta kaalmada adeegyada, waxhannah bob hayaan adeeg khfroylan maciel . So [facility] [place] [phone].    ATENCIÓN: Si habla español, tiene a titus disposición servicios gratuitos de asistencia lingüística. Llame al [phone].    We comply with applicable federal civil rights laws and [place] laws. We do not discriminate on the basis of race, color, national origin, age, disability, sex, sexual orientation, or gender identity.            Thank you!     Thank you for choosing [facility]  for your care. Our goal is always to provide you with excellent care. Hearing back from our patients is one way we can continue to improve our services. Please take a few minutes to complete the written survey that you may receive in the mail after your visit with us. Thank you!             Your Updated Medication List - Protect others around you: Learn how to safely use, store and throw away your medicines at www.disposemymeds.org.          This list is accurate as of 11/9/18  2:05 PM.  Always use your most recent med list.                   Brand Name Dispense Instructions for use Diagnosis    cetirizine 10 MG tablet    zyrTEC     Take 10 mg by mouth 2 times daily as needed for allergies        EPINEPHrine  0.3 MG/0.3ML injection 2-pack    EPIPEN 2-ODETTE    0.6 mL    Inject 0.3 mLs (0.3 mg) into the muscle once as needed for anaphylaxis    Need for desensitization to allergens       KLONOPIN PO      Take 1 mg by mouth daily as needed for anxiety        levonorgestrel 20 MCG/24HR IUD    MIRENA     1 each (20 mcg) by Intrauterine route continuous    Encounter for insertion of mirena IUD       * lurasidone 60 MG Tabs tablet    LATUDA    30 tablet    Take 1 tablet (60 mg) by mouth daily    Paranoia (psychosis) (H)       * LATUDA 80 MG Tabs tablet   Generic drug:  lurasidone           ofloxacin 0.3 % ophthalmic solution    OCUFLOX    1 Bottle    Place 2 drops into the right eye every 4 hours        * ORDER FOR ALLERGEN IMMUNOTHERAPY 5 mL vial     5 mL    Cat Hair, Standardized 10,000 BAU/mL, ALK  3.0 ml Dog Hair Dander, A. P.  1:100 w/v, HS  1.0 ml Dust Mites F 30,000AU/mL, HS  0.5 ml Dust Mites P. 30,000 AU/mL, HS  0.5 ml  Diluent: HSA qs to 5ml    Allergic rhinitis due to animal dander, Allergic rhinitis due to dust mite       * ORDER FOR ALLERGEN IMMUNOTHERAPY 5 mL vial     5 mL    Alternaria Tenuis 1:10 w/v, HS  0.5 ml Epicoccum Nigrum 1:10 w/v, HS 0.5 ml Hormodendrum Cladosporioides 1:10 w/v, HS 0.5 ml Diluent: HSA qs to 5ml    Allergic rhinitis due to mold       * ORDER FOR ALLERGEN IMMUNOTHERAPY 5 mL vial     5 mL    Estuardo, White  1:20 w/v, HS  0.5 ml Birch Mix PRW 1:20 w/v, HS  0.5 ml Elm, American 1:20 w/v, HS  0.5 ml Hackberry 1:20 w/v, HS 0.5 ml Hickory, Shagbark 1:20 w/v, HS  0.5 ml Sweet Briar Mix RW 1:20 w/v, HS 0.5 ml Oak Mix RVW 1:20 w/v, HS 0.5 ml [place] Tree, Black 1:20 w/v, HS 0.5 ml Black Diamond, Black 1:20 w/v, HS 0.5 ml Diluent: HSA qs to 5ml    Chronic seasonal allergic rhinitis due to pollen       * ORDER FOR ALLERGEN IMMUNOTHERAPY 5 mL vial     5 mL    Kochia 1:20 w/v, HS 1.0 ml Nettle 1:20 w/v, HS 1.0 ml Plantain, English 1:20 w/v, HS 1.0 ml Ragweed Mixed 1:20 w/v ALK  0.6 ml Russian Thistle 1:20 w/v, HS 1.0 ml  Diluent: HSA qs to 5ml    Chronic seasonal allergic rhinitis due to pollen       ranitidine 150 MG tablet    ZANTAC     Take 150 mg by mouth At Bedtime        topiramate 25 MG tablet    TOPAMAX    180 tablet    Take 2 tablets (50 mg) by mouth 2 times daily    Schizoaffective disorder, bipolar type (H)       traZODone 50 MG tablet    DESYREL    30 tablet    Take 50 mg by mouth nightly as needed for sleep    Insomnia, unspecified type       ziprasidone 80 MG capsule    GEODON    30 capsule    Take 1 capsule (80 mg) by mouth At Bedtime    Paranoia (psychosis) (H)       * Notice:  This list has 6 medication(s) that are the same as other medications prescribed for you. Read the directions carefully, and ask your doctor or other care provider to review them with you.

## 2018-11-09 NOTE — TELEPHONE ENCOUNTER
ALLERGY SOLUTION RE-ORDER REQUEST    Maddy Ortiz 1985 MRN: [id number]    DATE NEEDED:  ASAP  Vial Color Content   Top Dose   Last Dose Vial Size  Red 1:1 Cat, Dog, Dust Mite   Red 1:1 0.5   Red 1:10.45 5mL  Red 1:1 Molds   Red 1:1 0.5   Red 1:10.45 5mL  Red 1:1 Trees   Red 1:1 0.5   Red 1:10.45 5mL  Red 1:1 Weeds   Red 1:1 0.5   Red 1:10.45 5mL      Serum reorder consent signed and patient/parent was advised that new serums would be ordered through the pharmacy and billed to their insurance company when they arrive in clinic. Yes    Shot Clinic Location:  [place]  Ship to Location: [place]  Serum billed to:  [place]    Special Instructions:          Updated Prescription Needed: No      Requester Signature  Kenney Pena

## 2018-11-15 DIAGNOSIS — J30.1 SEASONAL ALLERGIC RHINITIS DUE TO POLLEN: ICD-10-CM

## 2018-11-15 DIAGNOSIS — J30.89 ALLERGIC RHINITIS DUE TO MOLD: Primary | ICD-10-CM

## 2018-11-15 DIAGNOSIS — J30.81 ALLERGIC RHINITIS DUE TO ANIMAL DANDER: ICD-10-CM

## 2018-11-15 DIAGNOSIS — J30.89 ALLERGIC RHINITIS DUE TO DUST MITE: ICD-10-CM

## 2018-11-15 PROCEDURE — 95165 ANTIGEN THERAPY SERVICES: CPT | Performed by: ALLERGY & IMMUNOLOGY

## 2018-11-15 NOTE — TELEPHONE ENCOUNTER
Allergy serums received at [place].     Vials received below:    Vial Color Content                      Vial Size Expiration Date  Red 1:1 Cat, Dog, Dust Mite 5mL  11/13/2019  Red 1:1 Molds 5mL  11/13/2019  Red 1:1 Trees 5mL  11/13/2019  Red 1:1 Weeds 5mL  11/13/2019      Signature  Bradford Roe RN

## 2018-11-15 NOTE — PROGRESS NOTES
Allergy serums billed at [place].     Vials received below:    Vial Color Content                      Vial Size Expiration Date  Red 1:1 Cat, Dog, Dust Mite 5mL  11/13/2019  Red 1:1 Molds 5mL  11/13/2019  Red 1:1 Trees 5mL  11/13/2019  Red 1:1 Weeds 5mL  11/13/2019    Original Refill encounter date: 11/9/2018      Signature  Bradford Roe RN

## 2018-11-29 ENCOUNTER — TELEPHONE (OUTPATIENT)
Dept: ALLERGY | Facility: CLINIC | Age: 33
End: 2018-11-29

## 2018-11-29 ENCOUNTER — ALLIED HEALTH/NURSE VISIT (OUTPATIENT)
Dept: ALLERGY | Facility: CLINIC | Age: 33
End: 2018-11-29
Payer: MEDICAID

## 2018-11-29 DIAGNOSIS — J30.9 ALLERGIC RHINITIS: Primary | ICD-10-CM

## 2018-11-29 PROCEDURE — 95117 IMMUNOTHERAPY INJECTIONS: CPT

## 2018-11-29 PROCEDURE — 99207 ZZC DROP WITH A PROCEDURE: CPT

## 2018-11-29 NOTE — TELEPHONE ENCOUNTER
Patient's last dose 11/9/18 0.45mL Red All vials.  Patient now has to begin all new vials.  Dr Blevins had given a restart dose on 11/9/18 0.3 mL Red All Vials  Patient has injection appointment today 11/29/18 (day 20) at 2pm  Please advise: What is restart dose for new vials at patient's injection appointment today?  How long is dose good for?  Routed to Dr Blevins for consideration.  Bradford Roe RN

## 2018-11-29 NOTE — MR AVS SNAPSHOT
After Visit Summary   11/29/2018    Maddy Ortiz    MRN: [id number]           Patient Information     Date Of Birth          1985        Visit Information        Provider Department      11/29/2018 2:00 PM ALLERGY [facility]        Today's Diagnoses     Allergic rhinitis    -  1       Follow-ups after your visit        Your next 10 appointments already scheduled     Nov 29, 2018  2:00 PM CST   Nurse Only with ALLERGY [facility] ([facility])    [address] [address] [phone]           Every allergy patient MUST wait 30 minutes after their allergy shot. No exceptions.  Xolair shots #1-3 should plan to wait 2 hours in clinic Xolair shots after #4 should plan 30 minute wait in clinic            Dec 06, 2018  1:00 PM CST   Nurse Only with ALLERGY [facility] ([facility])    [address] [address] [phone]           Every allergy patient MUST wait 30 minutes after their allergy shot. No exceptions.  Xolair shots #1-3 should plan to wait 2 hours in clinic Xolair shots after #4 should plan 30 minute wait in clinic              Who to contact     If you have questions or need follow up information about today's clinic visit or your schedule please contact [facility] directly at [phone].  Normal or non-critical lab and imaging results will be communicated to you by MyChart, letter or phone within 4 business days after the clinic has received the results. If you do not hear from us within 7 days, please contact the clinic through MyChart or phone. If you have a critical or abnormal lab result, we will notify you by phone as soon as possible.  Submit refill requests through [employer] or call your pharmacy and they will forward the refill request to us. Please allow 3 business days for your refill to be completed.           Additional Information About Your Visit        Care EveryWhere ID     This is your Care EveryWhere ID. This could be used by other organizations to access your [place] medical records  [id number]         Blood Pressure from Last 3 Encounters:   10/15/18 (!) 130/91   09/04/18 128/89   09/03/18 (!) 135/99    Weight from Last 3 Encounters:   10/15/18 89.2 kg (196 lb 9.6 oz)   09/04/18 89.4 kg (197 lb)   09/03/18 89.4 kg (197 lb)              We Performed the Following     Allergy Shot: Two or more injections        Primary Care Provider Office Phone # Fax #    Shirley Delaney Pete, APRN [facility] [phone] [address] [address]        Goals        General    Medication 1 (pt-stated)     Notes - Note created  8/30/2018  4:26 PM by Padmini Beatty LSW    Goal Statement: I need to get my medication.  Measure of Success: will have medication  Supportive Steps to Achieve: Verified MA is active  Barriers:   Strengths: pt called ins, will go to pharmacy to get meds  Date to Achieve By: 9/1/18  Patient expressed understanding of goal: fair        Mental Health Management (pt-stated)     Notes - Note created  8/31/2018  9:12 AM by Padmini Beatty LSW    Goal Statement: I will follow up with my therapist and Psychiatrist  Measure of Success: will keep scheduled appts  Supportive Steps to Achieve: has appts  Barriers:   Strengths: has established supports   Date to Achieve By:Sept 15th.   Patient expressed understanding of goal: yes          Equal Access to Services     BRIGID MCKEON AH: Hadii aad ku hadasho Soomaali, waaxda luqadaha, qaybta kaalmada adeegyada, lincoln maciel . So [facility] [phone].    ATENCIÓN: Si habla español, tiene a titus disposición servicios gratuitos de asistencia lingüística. Llame al [phone].    We comply with applicable federal civil rights laws and [place] laws. We do not discriminate on the basis of race, color, national  origin, age, disability, sex, sexual orientation, or gender identity.            Thank you!     Thank you for choosing [facility]  for your care. Our goal is always to provide you with excellent care. Hearing back from our patients is one way we can continue to improve our services. Please take a few minutes to complete the written survey that you may receive in the mail after your visit with us. Thank you!             Your Updated Medication List - Protect others around you: Learn how to safely use, store and throw away your medicines at www.disposemymeds.org.          This list is accurate as of 11/29/18  1:37 PM.  Always use your most recent med list.                   Brand Name Dispense Instructions for use Diagnosis    cetirizine 10 MG tablet    zyrTEC     Take 10 mg by mouth 2 times daily as needed for allergies        EPINEPHrine 0.3 MG/0.3ML injection 2-pack    EPIPEN 2-ODETTE    0.6 mL    Inject 0.3 mLs (0.3 mg) into the muscle once as needed for anaphylaxis    Need for desensitization to allergens       KLONOPIN PO      Take 1 mg by mouth daily as needed for anxiety        levonorgestrel 20 MCG/24HR IUD    MIRENA     1 each (20 mcg) by Intrauterine route continuous    Encounter for insertion of mirena IUD       * lurasidone 60 MG Tabs tablet    LATUDA    30 tablet    Take 1 tablet (60 mg) by mouth daily    Paranoia (psychosis) (H)       * LATUDA 80 MG Tabs tablet   Generic drug:  lurasidone           ofloxacin 0.3 % ophthalmic solution    OCUFLOX    1 Bottle    Place 2 drops into the right eye every 4 hours        ORDER FOR ALLERGEN IMMUNOTHERAPY 5 mL vial     5 mL    Cat Hair, Standardized 10,000 BAU/mL, ALK  3.0 ml Dog Hair Dander, A. P.  1:100 w/v, HS  1.0 ml Dust Mites F 30,000AU/mL, HS  0.5 ml Dust Mites P. 30,000 AU/mL, HS  0.5 ml  Diluent: HSA qs to 5ml    Allergic rhinitis due to animal dander, Allergic rhinitis due to dust mite       ORDER FOR ALLERGEN IMMUNOTHERAPY 5 mL vial     5 mL     Alternaria Tenuis 1:10 w/v, HS  0.5 ml Epicoccum Nigrum 1:10 w/v, HS 0.5 ml Hormodendrum Cladosporioides 1:10 w/v, HS 0.5 ml Diluent: HSA qs to 5ml    Allergic rhinitis due to mold       ORDER FOR ALLERGEN IMMUNOTHERAPY 5 mL vial     5 mL    Estuardo, White  1:20 w/v, HS  0.5 ml Birch Mix PRW 1:20 w/v, HS  0.5 ml Elm, American 1:20 w/v, HS  0.5 ml Hackberry 1:20 w/v, HS 0.5 ml Hickory, Shagbark 1:20 w/v, HS  0.5 ml [place] Mix RW 1:20 w/v, HS 0.5 ml Oak Mix RVW 1:20 w/v, HS 0.5 ml [place] Tree, Black 1:20 w/v, HS 0.5 ml [place], Black 1:20 w/v, HS 0.5 ml Diluent: HSA qs to 5ml    Chronic seasonal allergic rhinitis due to pollen       ORDER FOR ALLERGEN IMMUNOTHERAPY 5 mL vial     5 mL    Kochia 1:20 w/v, HS 1.0 ml Nettle 1:20 w/v, HS 1.0 ml Plantain, English 1:20 w/v, HS 1.0 ml Ragweed Mixed 1:20 w/v ALK  0.6 ml Russian Thistle 1:20 w/v, HS 1.0 ml Diluent: HSA qs to 5ml    Chronic seasonal allergic rhinitis due to pollen       ranitidine 150 MG tablet    ZANTAC     Take 150 mg by mouth At Bedtime        topiramate 25 MG tablet    TOPAMAX    180 tablet    Take 2 tablets (50 mg) by mouth 2 times daily    Schizoaffective disorder, bipolar type (H)       traZODone 50 MG tablet    DESYREL    30 tablet    Take 50 mg by mouth nightly as needed for sleep    Insomnia, unspecified type       ziprasidone 80 MG capsule    GEODON    30 capsule    Take 1 capsule (80 mg) by mouth At Bedtime    Paranoia (psychosis) (H)       * Notice:  This list has 2 medication(s) that are the same as other medications prescribed for you. Read the directions carefully, and ask your doctor or other care provider to review them with you.

## 2018-11-30 ENCOUNTER — PATIENT OUTREACH (OUTPATIENT)
Dept: CARE COORDINATION | Facility: CLINIC | Age: 33
End: 2018-11-30

## 2018-11-30 NOTE — PROGRESS NOTES
Social Work Care Coordination  Call placed to patient as requested.   Had to leave a message on her voice mail, requesting a return call.    Plan: Sw will attempt to contact pt again in 2-3 business days.      Rosemary Beatty [facility]  , Clinic Care Coordination  Clinics:  [facility] [place], [place]  [phone]   11/30/2018   1:40 PM      Clinic Care Coordination Contact  Care Team Conversations - Social Work    Follow -up call placed to patient. She was in the medical Transport car and could not really talk. She id say she is in the process of changing therapist.  Pt asked me to call back this afternoon.    Plan SW will attempt to contact pt in 1-2 business days if not reached today.    Rosemary Beatty [facility]  , Clinic Care Coordination  Clinics:  [facility] [place], [place]  [phone]   11/30/2018   11:19 AM

## 2018-12-04 ENCOUNTER — PATIENT OUTREACH (OUTPATIENT)
Dept: CARE COORDINATION | Facility: CLINIC | Age: 33
End: 2018-12-04

## 2018-12-04 NOTE — PROGRESS NOTES
Clinic Care Coordination Contact  [facility]/Voicemail Social Work  Received a return call from pt.Asking me to call her today. Had to leave a message,    Clinical Data: . Left message on voicemail with call back information and requested return call.    Plan: Care Coordinator will mail out care coordination introduction letter with care coordinator contact information and explanation of care coordination services. Care Coordinator will try to reach patient again in 1 week    Rosemary Beatty [facility]  , Clinic Care Coordination  Clinics:  [facility] [place], [place]  [phone]   12/4/2018   12:16 PM

## 2018-12-06 ENCOUNTER — ALLIED HEALTH/NURSE VISIT (OUTPATIENT)
Dept: ALLERGY | Facility: CLINIC | Age: 33
End: 2018-12-06
Payer: MEDICAID

## 2018-12-06 DIAGNOSIS — J30.9 ALLERGIC RHINITIS: Primary | ICD-10-CM

## 2018-12-06 PROCEDURE — 95117 IMMUNOTHERAPY INJECTIONS: CPT

## 2018-12-06 PROCEDURE — 99207 ZZC DROP WITH A PROCEDURE: CPT

## 2018-12-06 NOTE — MR AVS SNAPSHOT
After Visit Summary   12/6/2018    Maddy Ortiz    MRN: [id number]           Patient Information     Date Of Birth          1985        Visit Information        Provider Department      12/6/2018 1:00 PM ALLERGY [facility] - [facility]        Today's Diagnoses     Allergic rhinitis    -  1       Follow-ups after your visit        Who to contact     If you have questions or need follow up information about today's clinic visit or your schedule please contact [facility] directly at [phone].  Normal or non-critical lab and imaging results will be communicated to you by MyChart, letter or phone within 4 business days after the clinic has received the results. If you do not hear from us within 7 days, please contact the clinic through MyChart or phone. If you have a critical or abnormal lab result, we will notify you by phone as soon as possible.  Submit refill requests through [employer] or call your pharmacy and they will forward the refill request to us. Please allow 3 business days for your refill to be completed.          Additional Information About Your Visit        Care EveryWhere ID     This is your Care EveryWhere ID. This could be used by other organizations to access your [place] medical records  [id number]         Blood Pressure from Last 3 Encounters:   10/15/18 (!) 130/91   09/04/18 128/89   09/03/18 (!) 135/99    Weight from Last 3 Encounters:   10/15/18 89.2 kg (196 lb 9.6 oz)   09/04/18 89.4 kg (197 lb)   09/03/18 89.4 kg (197 lb)              We Performed the Following     Allergy Shot: Two or more injections        Primary Care Provider Office Phone # Fax #    Shirley PHILL [facility] [phone]       [address] [address]        Goals        General    Medication 1 (pt-stated)     Notes - Note created  8/30/2018  4:26 PM by Padmini Beatty LSW    Goal Statement: I need to get my medication.  Measure  of Success: will have medication  Supportive Steps to Achieve: Verified MA is active  Barriers:   Strengths: pt called ins, will go to pharmacy to get meds  Date to Achieve By: 9/1/18  Patient expressed understanding of goal: fair        Mental Health Management (pt-stated)     Notes - Note created  8/31/2018  9:12 AM by Padmini Beatty LSW    Goal Statement: I will follow up with my therapist and Psychiatrist  Measure of Success: will keep scheduled appts  Supportive Steps to Achieve: has appts  Barriers:   Strengths: has established supports   Date to Achieve By:Sept 15th.   Patient expressed understanding of goal: yes          Equal Access to Services     [facility]: Hadii aad ku hadasho Soomaali, waaxda luqadaha, qaybta kaalmada adeegyada, waxhannah bob hayaan adeeg khfroylan maciel . So [facility] [phone].    ATENCIÓN: Si habla español, tiene a titus disposición servicios gratuitos de asistencia lingüística. Llame al [phone].    We comply with applicable federal civil rights laws and [place] laws. We do not discriminate on the basis of race, color, national origin, age, disability, sex, sexual orientation, or gender identity.            Thank you!     Thank you for choosing [facility]  for your care. Our goal is always to provide you with excellent care. Hearing back from our patients is one way we can continue to improve our services. Please take a few minutes to complete the written survey that you may receive in the mail after your visit with us. Thank you!             Your Updated Medication List - Protect others around you: Learn how to safely use, store and throw away your medicines at www.disposemymeds.org.          This list is accurate as of 12/6/18  1:35 PM.  Always use your most recent med list.                   Brand Name Dispense Instructions for use Diagnosis    cetirizine 10 MG tablet    zyrTEC     Take 10 mg by mouth 2 times daily as needed for allergies        EPINEPHrine  0.3 MG/0.3ML injection 2-pack    EPIPEN 2-ODETTE    0.6 mL    Inject 0.3 mLs (0.3 mg) into the muscle once as needed for anaphylaxis    Need for desensitization to allergens       KLONOPIN PO      Take 1 mg by mouth daily as needed for anxiety        levonorgestrel 20 MCG/24HR IUD    MIRENA     1 each (20 mcg) by Intrauterine route continuous    Encounter for insertion of mirena IUD       * lurasidone 60 MG Tabs tablet    LATUDA    30 tablet    Take 1 tablet (60 mg) by mouth daily    Paranoia (psychosis) (H)       * LATUDA 80 MG Tabs tablet   Generic drug:  lurasidone           ofloxacin 0.3 % ophthalmic solution    OCUFLOX    1 Bottle    Place 2 drops into the right eye every 4 hours        ORDER FOR ALLERGEN IMMUNOTHERAPY 5 mL vial     5 mL    Cat Hair, Standardized 10,000 BAU/mL, ALK  3.0 ml Dog Hair Dander, A. P.  1:100 w/v, HS  1.0 ml Dust Mites F 30,000AU/mL, HS  0.5 ml Dust Mites P. 30,000 AU/mL, HS  0.5 ml  Diluent: HSA qs to 5ml    Allergic rhinitis due to animal dander, Allergic rhinitis due to dust mite       ORDER FOR ALLERGEN IMMUNOTHERAPY 5 mL vial     5 mL    Alternaria Tenuis 1:10 w/v, HS  0.5 ml Epicoccum Nigrum 1:10 w/v, HS 0.5 ml Hormodendrum Cladosporioides 1:10 w/v, HS 0.5 ml Diluent: HSA qs to 5ml    Allergic rhinitis due to mold       ORDER FOR ALLERGEN IMMUNOTHERAPY 5 mL vial     5 mL    Estuardo, White  1:20 w/v, HS  0.5 ml Birch Mix PRW 1:20 w/v, HS  0.5 ml Elm, American 1:20 w/v, HS  0.5 ml Hackberry 1:20 w/v, HS 0.5 ml Hickory, Shagbark 1:20 w/v, HS  0.5 ml [place] Mix RW 1:20 w/v, HS 0.5 ml Oak Mix RVW 1:20 w/v, HS 0.5 ml [place] Tree, Black 1:20 w/v, HS 0.5 ml [place], Black 1:20 w/v, HS 0.5 ml Diluent: HSA qs to 5ml    Chronic seasonal allergic rhinitis due to pollen       ORDER FOR ALLERGEN IMMUNOTHERAPY 5 mL vial     5 mL    Kochia 1:20 w/v, HS 1.0 ml Nettle 1:20 w/v, HS 1.0 ml Plantain, English 1:20 w/v, HS 1.0 ml Ragweed Mixed 1:20 w/v ALK  0.6 ml Russian Thistle 1:20 w/v, HS 1.0 ml  Diluent: HSA qs to 5ml    Chronic seasonal allergic rhinitis due to pollen       ranitidine 150 MG tablet    ZANTAC     Take 150 mg by mouth At Bedtime        topiramate 25 MG tablet    TOPAMAX    180 tablet    Take 2 tablets (50 mg) by mouth 2 times daily    Schizoaffective disorder, bipolar type (H)       traZODone 50 MG tablet    DESYREL    30 tablet    Take 50 mg by mouth nightly as needed for sleep    Insomnia, unspecified type       ziprasidone 80 MG capsule    GEODON    30 capsule    Take 1 capsule (80 mg) by mouth At Bedtime    Paranoia (psychosis) (H)       * Notice:  This list has 2 medication(s) that are the same as other medications prescribed for you. Read the directions carefully, and ask your doctor or other care provider to review them with you.

## 2018-12-12 ENCOUNTER — PATIENT OUTREACH (OUTPATIENT)
Dept: CARE COORDINATION | Facility: CLINIC | Age: 33
End: 2018-12-12

## 2018-12-13 ENCOUNTER — ALLIED HEALTH/NURSE VISIT (OUTPATIENT)
Dept: ALLERGY | Facility: CLINIC | Age: 33
End: 2018-12-13
Payer: MEDICAID

## 2018-12-13 DIAGNOSIS — J30.9 ALLERGIC RHINITIS: Primary | ICD-10-CM

## 2018-12-13 PROCEDURE — 99207 ZZC DROP WITH A PROCEDURE: CPT

## 2018-12-13 PROCEDURE — 95117 IMMUNOTHERAPY INJECTIONS: CPT

## 2018-12-20 ENCOUNTER — PATIENT OUTREACH (OUTPATIENT)
Dept: CARE COORDINATION | Facility: CLINIC | Age: 33
End: 2018-12-20

## 2018-12-20 NOTE — LETTER
[place] CARE COORDINATION    [facility]  [address], [place]  [address]      December 20, 2018    Maddy Ortiz  [address]   [facility] [address]      Dear Maddy,    I am a clinic care coordinator who works with PHILL Yan CNP at the [facility].  I recently tried to call and was unable to reach you. When we have connected,  you have not been able to talk. I wanted to again  introduce myself and provide you with my contact information so that you can call me with questions or concerns about your health care. Below is a description of clinic care coordination and how I can further assist you.     The clinic care coordinator is a registered nurse and/or  who understand the health care system. The goal of clinic care coordination is to help you manage your health and improve access to the [facility] system in the most efficient manner. The registered nurse can assist you in meeting your health care goals by providing education, coordinating services, and strengthening the communication among your providers. The  can assist you with financial, behavioral, psychosocial, chemical dependency, counseling, and/or psychiatric resources.    If I can be of any assistance, please feel free to contact me at [phone], with any questions or concerns. We at [facility] are focused on providing you with the highest-quality healthcare experience possible and that all starts with you.     Sincerely,       Rosemary Beatty [facility]  , Clinic Care Coordination  Clinics:  [place], [place]  [phone]   12/20/2018   3:55 PM    Enclosed: I have enclosed a copy of the Complex Care Plan. This has helpful information and goals that we have talked about. Please keep this in an easy to access place to use as needed.

## 2018-12-20 NOTE — PROGRESS NOTES
Clinic Care Coordination Contact  [facility]  Social Work     Referral Source:    Clinical Data: Care Coordinator Outreach    Outreach attempted x 3.   When I do reach patient,  she always asks if she can call me back and does not   We arranged a time for me to call her and she was not available.    Plan: Care Coordinator will mail out care coordination Unable to contact Letter information and explanation of care coordination services.   If  patient  does not respond  By Jan 8th . Care Coordinator will do no further outreaches at this time.    Rosemary Beatty [facility]  , Clinic Care Coordination  Clinics:  [place],  [place], [place]  [phone]   12/20/2018   3:52 PM

## 2018-12-27 ENCOUNTER — TELEPHONE (OUTPATIENT)
Dept: ALLERGY | Facility: CLINIC | Age: 33
End: 2018-12-27

## 2019-01-02 ENCOUNTER — OFFICE VISIT (OUTPATIENT)
Dept: FAMILY MEDICINE | Facility: CLINIC | Age: 34
End: 2019-01-02
Payer: MEDICAID

## 2019-01-02 VITALS
DIASTOLIC BLOOD PRESSURE: 70 MMHG | BODY MASS INDEX: 35.08 KG/M2 | SYSTOLIC BLOOD PRESSURE: 112 MMHG | OXYGEN SATURATION: 98 % | WEIGHT: 198 LBS | HEART RATE: 109 BPM | TEMPERATURE: 99.6 F | HEIGHT: 63 IN

## 2019-01-02 DIAGNOSIS — E66.01 MORBID OBESITY (H): ICD-10-CM

## 2019-01-02 DIAGNOSIS — H61.23 EXCESSIVE CERUMEN IN EAR CANAL, BILATERAL: Primary | ICD-10-CM

## 2019-01-02 DIAGNOSIS — F25.0 SCHIZOAFFECTIVE DISORDER, BIPOLAR TYPE (H): ICD-10-CM

## 2019-01-02 PROCEDURE — 99214 OFFICE O/P EST MOD 30 MIN: CPT | Performed by: NURSE PRACTITIONER

## 2019-01-02 RX ORDER — OLANZAPINE 20 MG/1
20 TABLET ORAL AT BEDTIME
COMMUNITY
End: 2020-08-13

## 2019-01-02 ASSESSMENT — MIFFLIN-ST. JEOR: SCORE: 1567.25

## 2019-01-02 NOTE — NURSING NOTE
Patient identified using two patient identifiers.  Ear exam showing wax occlusion completed by provider.  H202/H20 was placed in the bilateral ear(s) via 60cc syringe.

## 2019-01-02 NOTE — PATIENT INSTRUCTIONS
Thank you for choosing [facility].  You may be receiving a survey in the mail from Roma Longoria regarding your visit today.  Please take a few minutes to complete and return the survey to let us know how we are doing.      If you have questions or concerns, please contact us via [employer] or you can contact your care team at [phone].    Our Clinic hours are:  Monday 6:40 am  to 7:00 pm  Tuesday -Friday 6:40 am to 5:00 pm    The [place] outpatient lab hours are:  Monday - Friday 6:10 am to 4:45 pm  Saturdays 7:00 am to 11:00 am  Appointments are required, call [phone]    If you have clinical questions after hours or would like to schedule an appointment,  call the clinic at [phone].

## 2019-01-02 NOTE — PROGRESS NOTES
"  SUBJECTIVE:   Maddy Ortiz is a 33 year old female who presents to clinic today for the following health issues:      Ear problem      Duration: 1-2,weeks    Description (location/character/radiation): patient c/o itching in her ears and irritation    Thinks maybe she has wax buildup    Intensity:  moderate    Accompanying signs and symptoms: no pain, can hear OK    History (similar episodes/previous evaluation): admits to ear wax build up in the past, has had to have her ears flushed    Precipitating or alleviating factors: None    Therapies tried and outcome: OTC ear drops for wax       Schizoaffective disorder:  Managed by psychiatry  Patient reports that she is currently feeling good.  Medications have caused weight gain.        Problem list and histories reviewed & adjusted, as indicated.  Additional history: as documented    Reviewed and updated as needed this visit by clinical staff  Tobacco  Allergies  Meds       Reviewed and updated as needed this visit by Provider         ROS:  Constitutional, HEENT, cardiovascular, pulmonary, gi and gu systems are negative, except as otherwise noted.    OBJECTIVE:     /70 (BP Location: Right arm)   Pulse 109   Temp 99.6  F (37.6  C) (Tympanic)   Ht 1.6 m (5' 3\")   Wt 89.8 kg (198 lb)   SpO2 98%   BMI 35.07 kg/m    Body mass index is 35.07 kg/m .  GENERAL: healthy, alert and no distress  HENT: Cerumen in both ear canals, not obstructing. Cleared with irrigation by CMA - exam was then normal.      ASSESSMENT/PLAN:       ICD-10-CM    1. Excessive cerumen in ear canal, bilateral H61.23 Removed with irrigation by CMA.  Patient advised to stop using drops, q-tips or other wax removal tools.     2. Morbid obesity (H) E66.01 Encourage weight loss     3. Schizoaffective disorder, bipolar type (H) F25.0 Stable  Following with psychiartry         The risks, benefits and treatment options of prescribed medications or other treatments have been discussed with the " patient. The patient verbalized their understanding and should call or follow up if no improvement or if they develop further problems.    PHILL Yan [facility]

## 2019-01-03 ENCOUNTER — ALLIED HEALTH/NURSE VISIT (OUTPATIENT)
Dept: ALLERGY | Facility: CLINIC | Age: 34
End: 2019-01-03
Payer: MEDICAID

## 2019-01-03 ENCOUNTER — OFFICE VISIT (OUTPATIENT)
Dept: ALLERGY | Facility: CLINIC | Age: 34
End: 2019-01-03
Payer: MEDICAID

## 2019-01-03 VITALS
OXYGEN SATURATION: 96 % | HEART RATE: 106 BPM | TEMPERATURE: 97 F | SYSTOLIC BLOOD PRESSURE: 118 MMHG | BODY MASS INDEX: 35.69 KG/M2 | DIASTOLIC BLOOD PRESSURE: 80 MMHG | RESPIRATION RATE: 18 BRPM | WEIGHT: 201.5 LBS

## 2019-01-03 DIAGNOSIS — J30.89 ALLERGIC RHINITIS DUE TO MOLD: ICD-10-CM

## 2019-01-03 DIAGNOSIS — J30.1 SEASONAL ALLERGIC RHINITIS DUE TO POLLEN: Primary | ICD-10-CM

## 2019-01-03 DIAGNOSIS — J30.81 ALLERGIC RHINITIS DUE TO ANIMAL DANDER: ICD-10-CM

## 2019-01-03 DIAGNOSIS — J30.89 ALLERGIC RHINITIS DUE TO DUST MITE: ICD-10-CM

## 2019-01-03 DIAGNOSIS — J30.9 ALLERGIC RHINITIS: Primary | ICD-10-CM

## 2019-01-03 DIAGNOSIS — Z23 NEED FOR PROPHYLACTIC VACCINATION AND INOCULATION AGAINST INFLUENZA: ICD-10-CM

## 2019-01-03 PROCEDURE — 99214 OFFICE O/P EST MOD 30 MIN: CPT | Mod: 25 | Performed by: ALLERGY & IMMUNOLOGY

## 2019-01-03 PROCEDURE — 95117 IMMUNOTHERAPY INJECTIONS: CPT

## 2019-01-03 PROCEDURE — 90471 IMMUNIZATION ADMIN: CPT | Performed by: ALLERGY & IMMUNOLOGY

## 2019-01-03 PROCEDURE — 99207 ZZC DROP WITH A PROCEDURE: CPT

## 2019-01-03 PROCEDURE — 90686 IIV4 VACC NO PRSV 0.5 ML IM: CPT | Performed by: ALLERGY & IMMUNOLOGY

## 2019-01-03 ASSESSMENT — ENCOUNTER SYMPTOMS
EYE ITCHING: 0
ARTHRALGIAS: 0
CHEST TIGHTNESS: 0
EYE REDNESS: 0
SHORTNESS OF BREATH: 0
VOMITING: 0
RHINORRHEA: 0
SINUS PRESSURE: 0
EYE DISCHARGE: 0
MYALGIAS: 0
NAUSEA: 0
COUGH: 0
HEADACHES: 0
FACIAL SWELLING: 0
JOINT SWELLING: 0
ACTIVITY CHANGE: 0
ADENOPATHY: 0
FEVER: 0
DIARRHEA: 0
WHEEZING: 0
CHILLS: 0

## 2019-01-03 NOTE — LETTER
1/3/2019         RE: Maddy Ortiz  [address]  [facility] [id number]        Dear Colleague,    Thank you for referring your patient, Maddy Ortiz, to the [facility]. Please see a copy of my visit note below.    SUBJECTIVE:                                                               Maddy Ortiz presents today to our Allergy Clinic at [facility]  for a new patient visit.  As you know, she is a 33 year old female with allergic rhinitis. Transfer from Dr. Blevins.  Percutaneous skin puncture testing for aeroallergens performed in November 2016 showed sensitivity to dog, cat, dust mite, molds, pollen of trees, grass, and weeds.  In spring-summer 2018, she had a buildup using cluster schedule for allergen immunotherapy.  She reached maintenance dose in July 2018.    Allergy Immunotherapy  Date/time of injection(s): 1/3/2019    Vial Color Content  Dose  Red 1:1 Cat, Dog, Dust Mite  0.5mL  Red 1:1 Molds  0.5mL   Red 1:1 Trees  0.5mL  Red 1:1   Weeds       0.5mL    She tolerates injections well without persistent large local reactions or systemic reactions.   She thinks she has improved with allergen immunotherapy. She doesn't use any nasal sprays. She doesn't take oral antihistamines. She states that oral antihistamines make her hungry, and she doesn't like using nasal sprays.  The patient denies clear rhinorrhea, nasal itch, stuffiness, sneezing or interval sinusitis symptoms of fever, facial pain or purulent rhinorrhea. She is satisfied with allergen immunotherapy and would like to continue for the next year.    Patient Active Problem List   Diagnosis     Animal dander allergy     CARDIOVASCULAR SCREENING; LDL GOAL LESS THAN 160     Psychosis (H)     Paranoid type delusional disorder (H)     Bipolar 1 disorder (H)     Insomnia     Depression with anxiety     Seasonal allergic rhinitis due to pollen     Allergic rhinitis due to mold     Allergic rhinitis due to  animal dander     Allergic rhinitis due to dust mite     Encounter for IUD insertion     Schizoaffective disorder, bipolar type (H)     Gastroesophageal reflux disease without esophagitis     Paranoia (psychosis) (H)     Obesity (BMI 35.0-39.9) with comorbidity (H)       Past Medical History:   Diagnosis Date     Bipolar 1 disorder (H) 12/6/2012     Depressive disorder      Paranoid type delusional disorder (H) 11/14/2012      *Patient is Adopted       Problem (# of Occurrences) Relation (Name,Age of Onset)    Unknown/Adopted (3) Mother, Father, Other        Past Surgical History:   Procedure Laterality Date     TONSILLECTOMY & ADENOIDECTOMY      as a child     Social History     Socioeconomic History     Marital status: Single     Spouse name: None     Number of children: None     Years of education: None     Highest education level: None   Social Needs     Financial resource strain: None     Food insecurity - worry: None     Food insecurity - inability: None     Transportation needs - medical: None     Transportation needs - non-medical: None   Occupational History     None   Tobacco Use     Smoking status: Never Smoker     Smokeless tobacco: Never Used   Substance and Sexual Activity     Alcohol use: Yes     Comment: rare wine ( very rare)     Drug use: No     Sexual activity: Not Currently     Partners: Male     Birth control/protection: IUD   Other Topics Concern     Parent/sibling w/ CABG, MI or angioplasty before 65F 55M? No     Comment: patient was adopted; unknown family history   Social History Narrative    One child    Education: some college        January 3, 2019    ENVIRONMENTAL HISTORY: [place] The home is heated with a electric furnace. They do not have central air conditioning. The patient's bedroom is furnished with stuffed animals in bed, carpeting in bedroom and fabric window coverings. No pets inside the house. There is history of cockroach or mice  infestation. There are no smokers in the house.  The house does not have a damp basement.            Review of Systems   Constitutional: Negative for activity change, chills and fever.   HENT: Negative for congestion, dental problem, ear pain, facial swelling, nosebleeds, postnasal drip, rhinorrhea, sinus pressure and sneezing.    Eyes: Negative for discharge, redness and itching.   Respiratory: Negative for cough, chest tightness, shortness of breath and wheezing.    Cardiovascular: Negative for chest pain.   Gastrointestinal: Negative for diarrhea, nausea and vomiting.   Musculoskeletal: Negative for arthralgias, joint swelling and myalgias.   Skin: Negative for rash.   Allergic/Immunologic: Positive for environmental allergies.   Neurological: Negative for headaches.   Hematological: Negative for adenopathy.   Psychiatric/Behavioral: Negative for behavioral problems and self-injury.           Current Outpatient Medications:      cetirizine (ZYRTEC) 10 MG tablet, Take 10 mg by mouth 2 times daily as needed for allergies, Disp: , Rfl:      ClonazePAM (KLONOPIN PO), Take 1 mg by mouth daily as needed for anxiety , Disp: , Rfl:      LATUDA 80 MG TABS tablet, , Disp: , Rfl:      levonorgestrel (MIRENA) 20 MCG/24HR IUD, 1 each (20 mcg) by Intrauterine route continuous, Disp: , Rfl:      OLANZapine (ZYPREXA) 20 MG tablet, Take 20 mg by mouth At Bedtime, Disp: , Rfl:      ORDER FOR ALLERGEN IMMUNOTHERAPY, Cat Hair, Standardized 10,000 BAU/mL, ALK  3.0 ml Dog Hair Dander, A. P.  1:100 w/v, HS  1.0 ml Dust Mites F 30,000AU/mL, HS  0.5 ml Dust Mites P. 30,000 AU/mL, HS  0.5 ml  Diluent: HSA qs to 5ml, Disp: 5 mL, Rfl: PRN     ORDER FOR ALLERGEN IMMUNOTHERAPY, Alternaria Tenuis 1:10 w/v, HS  0.5 ml Epicoccum Nigrum 1:10 w/v, HS 0.5 ml Hormodendrum Cladosporioides 1:10 w/v, HS 0.5 ml Diluent: HSA qs to 5ml, Disp: 5 mL, Rfl: PRN     ORDER FOR ALLERGEN IMMUNOTHERAPY, Estuardo, White  1:20 w/v, HS  0.5 ml Birch Mix PRW 1:20 w/v, HS   0.5 ml Elm, American 1:20 w/v, HS  0.5 ml Hackberry 1:20 w/v, HS 0.5 ml Hickory, Shagbark 1:20 w/v, HS  0.5 ml [place] Mix RW 1:20 w/v, HS 0.5 ml Oak Mix RVW 1:20 w/v, HS 0.5 ml [place] Tree, Black 1:20 w/v, HS 0.5 ml Anna, Black 1:20 w/v, HS 0.5 ml Diluent: HSA qs to 5ml, Disp: 5 mL, Rfl: PRN     ORDER FOR ALLERGEN IMMUNOTHERAPY, Kochia 1:20 w/v, HS 1.0 ml Nettle 1:20 w/v, HS 1.0 ml Plantain, English 1:20 w/v, HS 1.0 ml Ragweed Mixed 1:20 w/v ALK  0.6 ml Russian Thistle 1:20 w/v, HS 1.0 ml Diluent: HSA qs to 5ml, Disp: 5 mL, Rfl: PRN     ranitidine (ZANTAC) 150 MG tablet, Take 150 mg by mouth At Bedtime, Disp: , Rfl:      topiramate (TOPAMAX) 25 MG tablet, Take 2 tablets (50 mg) by mouth 2 times daily, Disp: 180 tablet, Rfl: 5     traZODone (DESYREL) 50 MG tablet, Take 50 mg by mouth nightly as needed for sleep , Disp: 30 tablet, Rfl: 3     EPINEPHrine (EPIPEN 2-ODETTE) 0.3 MG/0.3ML injection 2-pack, Inject 0.3 mLs (0.3 mg) into the muscle once as needed for anaphylaxis (Patient not taking: Reported on 1/3/2019), Disp: 0.6 mL, Rfl: 3     lurasidone (LATUDA) 60 MG TABS tablet, Take 1 tablet (60 mg) by mouth daily (Patient not taking: Reported on 10/15/2018), Disp: 30 tablet, Rfl: 0     ofloxacin (OCUFLOX) 0.3 % ophthalmic solution, Place 2 drops into the right eye every 4 hours (Patient not taking: Reported on 1/2/2019), Disp: 1 Bottle, Rfl: 0     ziprasidone (GEODON) 80 MG capsule, Take 1 capsule (80 mg) by mouth At Bedtime (Patient not taking: Reported on 1/2/2019), Disp: 30 capsule, Rfl: 0  Immunization History   Administered Date(s) Administered     Influenza (IIV3) PF 12/06/2012     Influenza Vaccine IM 3yrs+ 4 Valent IIV4 11/21/2017, 01/03/2019     TDAP Vaccine (Adacel) 12/06/2012     Allergies   Allergen Reactions     Animal Dander      Nkda [No Known Drug Allergies]      Seasonal Allergies      Weeds and mold     OBJECTIVE:                                                                 /80 (BP  Location: Left arm, Patient Position: Sitting, Cuff Size: Adult Regular)   Pulse 106   Temp 97  F (36.1  C) (Oral)   Resp 18   Wt 91.4 kg (201 lb 8 oz)   SpO2 96%   BMI 35.69 kg/m           Physical Exam   Constitutional: She is oriented to person, place, and time. No distress.   HENT:   Head: Normocephalic and atraumatic.   Right Ear: Tympanic membrane, external ear and ear canal normal.   Left Ear: Tympanic membrane, external ear and ear canal normal.   Nose: Septal deviation (mild, to the right) present. No mucosal edema or rhinorrhea.   Mouth/Throat: Oropharynx is clear and moist and mucous membranes are normal.   Eyes: Conjunctivae are normal. Right eye exhibits no discharge. Left eye exhibits no discharge.   Neck: Normal range of motion.   Cardiovascular: Normal rate, regular rhythm and normal heart sounds.   No murmur heard.  Pulmonary/Chest: Effort normal and breath sounds normal. No respiratory distress. She has no wheezes. She has no rales.   Musculoskeletal: Normal range of motion.   Lymphadenopathy:     She has no cervical adenopathy.   Neurological: She is alert and oriented to person, place, and time.   Skin: Skin is warm. No rash noted. She is not diaphoretic.   Nursing note and vitals reviewed.    ASSESSMENT/PLAN:      Problem List Items Addressed This Visit        Respiratory    1. Seasonal allergic rhinitis due to pollen - Primary  Currently well controlled with allergen immunotherapy.  The patient is satisfied with the efficacy of immunotherapy and would like to continue it further, for the next year.  -Anticipate immunotherapy until 1349-2195.    2. Allergic rhinitis due to mold    3. Allergic rhinitis due to animal dander    4. Allergic rhinitis due to dust mite      Other Visit Diagnoses     5. Need for prophylactic vaccination and inoculation against influenza        Relevant Orders    FLU VACCINE, SPLIT VIRUS, IM (QUADRIVALENT) [50392]- >3 YRS (Completed)    Vaccine Administration,  Initial [89110] (Completed)        Return in about 1 year (around 1/3/2020), or if symptoms worsen or fail to improve.    Thank you for allowing us to participate in the care of this patient. Please feel free to contact us if there are any questions or concerns about the patient.    Disclaimer: This note consists of symbols derived from keyboarding, dictation and/or voice recognition software. As a result, there may be errors in the script that have gone undetected. Please consider this when interpreting information found in this chart.    Rudy Shin MD, [facility]  Allergy, Asthma and Immunology  [place], [place] and [place]      Injectable Influenza Immunization Documentation    1.  Is the person to be vaccinated sick today?   No    2. Does the person to be vaccinated have an allergy to a component   of the vaccine?   No  Egg Allergy Algorithm Link    3. Has the person to be vaccinated ever had a serious reaction   to influenza vaccine in the past?   No    4. Has the person to be vaccinated ever had Guillain-Barré syndrome?   No    Form completed by Callie VELASQUEZ   Allergy RN               Again, thank you for allowing me to participate in the care of your patient.        Sincerely,        Rudy Shin MD

## 2019-01-03 NOTE — PROGRESS NOTES
SUBJECTIVE:                                                               Maddy Ortiz presents today to our Allergy Clinic at [facility]  for a new patient visit.  As you know, she is a 33 year old female with allergic rhinitis. Transfer from Dr. Blevins.  Percutaneous skin puncture testing for aeroallergens performed in November 2016 showed sensitivity to dog, cat, dust mite, molds, pollen of trees, grass, and weeds.  In spring-summer 2018, she had a buildup using cluster schedule for allergen immunotherapy.  She reached maintenance dose in July 2018.    Allergy Immunotherapy  Date/time of injection(s): 1/3/2019    Vial Color Content  Dose  Red 1:1 Cat, Dog, Dust Mite  0.5mL  Red 1:1 Molds  0.5mL   Red 1:1 Trees  0.5mL  Red 1:1   Weeds       0.5mL    She tolerates injections well without persistent large local reactions or systemic reactions.   She thinks she has improved with allergen immunotherapy. She doesn't use any nasal sprays. She doesn't take oral antihistamines. She states that oral antihistamines make her hungry, and she doesn't like using nasal sprays.  The patient denies clear rhinorrhea, nasal itch, stuffiness, sneezing or interval sinusitis symptoms of fever, facial pain or purulent rhinorrhea. She is satisfied with allergen immunotherapy and would like to continue for the next year.    Patient Active Problem List   Diagnosis     Animal dander allergy     CARDIOVASCULAR SCREENING; LDL GOAL LESS THAN 160     Psychosis (H)     Paranoid type delusional disorder (H)     Bipolar 1 disorder (H)     Insomnia     Depression with anxiety     Seasonal allergic rhinitis due to pollen     Allergic rhinitis due to mold     Allergic rhinitis due to animal dander     Allergic rhinitis due to dust mite     Encounter for IUD insertion     Schizoaffective disorder, bipolar type (H)     Gastroesophageal reflux disease without esophagitis     Paranoia (psychosis) (H)     Obesity (BMI 35.0-39.9) with  comorbidity (H)       Past Medical History:   Diagnosis Date     Bipolar 1 disorder (H) 12/6/2012     Depressive disorder      Paranoid type delusional disorder (H) 11/14/2012      *Patient is Adopted       Problem (# of Occurrences) Relation (Name,Age of Onset)    Unknown/Adopted (3) Mother, Father, Other        Past Surgical History:   Procedure Laterality Date     TONSILLECTOMY & ADENOIDECTOMY      as a child     Social History     Socioeconomic History     Marital status: Single     Spouse name: None     Number of children: None     Years of education: None     Highest education level: None   Social Needs     Financial resource strain: None     Food insecurity - worry: None     Food insecurity - inability: None     Transportation needs - medical: None     Transportation needs - non-medical: None   Occupational History     None   Tobacco Use     Smoking status: Never Smoker     Smokeless tobacco: Never Used   Substance and Sexual Activity     Alcohol use: Yes     Comment: rare wine ( very rare)     Drug use: No     Sexual activity: Not Currently     Partners: Male     Birth control/protection: IUD   Other Topics Concern     Parent/sibling w/ CABG, MI or angioplasty before 65F 55M? No     Comment: patient was adopted; unknown family history   Social History Narrative    One child    Education: some college        January 3, 2019    ENVIRONMENTAL HISTORY: [place] The home is heated with a electric furnace. They do not have central air conditioning. The patient's bedroom is furnished with stuffed animals in bed, carpeting in bedroom and fabric window coverings. No pets inside the house. There is history of cockroach or mice infestation. There are no smokers in the house.  The house does not have a damp basement.            Review of Systems   Constitutional: Negative for activity change, chills and fever.   HENT: Negative for congestion, dental problem, ear pain,  facial swelling, nosebleeds, postnasal drip, rhinorrhea, sinus pressure and sneezing.    Eyes: Negative for discharge, redness and itching.   Respiratory: Negative for cough, chest tightness, shortness of breath and wheezing.    Cardiovascular: Negative for chest pain.   Gastrointestinal: Negative for diarrhea, nausea and vomiting.   Musculoskeletal: Negative for arthralgias, joint swelling and myalgias.   Skin: Negative for rash.   Allergic/Immunologic: Positive for environmental allergies.   Neurological: Negative for headaches.   Hematological: Negative for adenopathy.   Psychiatric/Behavioral: Negative for behavioral problems and self-injury.           Current Outpatient Medications:      cetirizine (ZYRTEC) 10 MG tablet, Take 10 mg by mouth 2 times daily as needed for allergies, Disp: , Rfl:      ClonazePAM (KLONOPIN PO), Take 1 mg by mouth daily as needed for anxiety , Disp: , Rfl:      LATUDA 80 MG TABS tablet, , Disp: , Rfl:      levonorgestrel (MIRENA) 20 MCG/24HR IUD, 1 each (20 mcg) by Intrauterine route continuous, Disp: , Rfl:      OLANZapine (ZYPREXA) 20 MG tablet, Take 20 mg by mouth At Bedtime, Disp: , Rfl:      ORDER FOR ALLERGEN IMMUNOTHERAPY, Cat Hair, Standardized 10,000 BAU/mL, ALK  3.0 ml Dog Hair Dander, A. P.  1:100 w/v, HS  1.0 ml Dust Mites F 30,000AU/mL, HS  0.5 ml Dust Mites P. 30,000 AU/mL, HS  0.5 ml  Diluent: HSA qs to 5ml, Disp: 5 mL, Rfl: PRN     ORDER FOR ALLERGEN IMMUNOTHERAPY, Alternaria Tenuis 1:10 w/v, HS  0.5 ml Epicoccum Nigrum 1:10 w/v, HS 0.5 ml Hormodendrum Cladosporioides 1:10 w/v, HS 0.5 ml Diluent: HSA qs to 5ml, Disp: 5 mL, Rfl: PRN     ORDER FOR ALLERGEN IMMUNOTHERAPY, Estuardo, White  1:20 w/v, HS  0.5 ml Birch Mix PRW 1:20 w/v, HS  0.5 ml Elm, American 1:20 w/v, HS  0.5 ml Hackberry 1:20 w/v, HS 0.5 ml Hickory, Shagbark 1:20 w/v, HS  0.5 ml [place] Mix RW 1:20 w/v, HS 0.5 ml Oak Mix RVW 1:20 w/v, HS 0.5 ml [place] Tree, Black 1:20 w/v, HS 0.5 ml [place], Black 1:20 w/v, HS  0.5 ml Diluent: HSA qs to 5ml, Disp: 5 mL, Rfl: PRN     ORDER FOR ALLERGEN IMMUNOTHERAPY, Kochia 1:20 w/v, HS 1.0 ml Nettle 1:20 w/v, HS 1.0 ml Plantain, English 1:20 w/v, HS 1.0 ml Ragweed Mixed 1:20 w/v ALK  0.6 ml Russian Thistle 1:20 w/v, HS 1.0 ml Diluent: HSA qs to 5ml, Disp: 5 mL, Rfl: PRN     ranitidine (ZANTAC) 150 MG tablet, Take 150 mg by mouth At Bedtime, Disp: , Rfl:      topiramate (TOPAMAX) 25 MG tablet, Take 2 tablets (50 mg) by mouth 2 times daily, Disp: 180 tablet, Rfl: 5     traZODone (DESYREL) 50 MG tablet, Take 50 mg by mouth nightly as needed for sleep , Disp: 30 tablet, Rfl: 3     EPINEPHrine (EPIPEN 2-ODETTE) 0.3 MG/0.3ML injection 2-pack, Inject 0.3 mLs (0.3 mg) into the muscle once as needed for anaphylaxis (Patient not taking: Reported on 1/3/2019), Disp: 0.6 mL, Rfl: 3     lurasidone (LATUDA) 60 MG TABS tablet, Take 1 tablet (60 mg) by mouth daily (Patient not taking: Reported on 10/15/2018), Disp: 30 tablet, Rfl: 0     ofloxacin (OCUFLOX) 0.3 % ophthalmic solution, Place 2 drops into the right eye every 4 hours (Patient not taking: Reported on 1/2/2019), Disp: 1 Bottle, Rfl: 0     ziprasidone (GEODON) 80 MG capsule, Take 1 capsule (80 mg) by mouth At Bedtime (Patient not taking: Reported on 1/2/2019), Disp: 30 capsule, Rfl: 0  Immunization History   Administered Date(s) Administered     Influenza (IIV3) PF 12/06/2012     Influenza Vaccine IM 3yrs+ 4 Valent IIV4 11/21/2017, 01/03/2019     TDAP Vaccine (Adacel) 12/06/2012     Allergies   Allergen Reactions     Animal Dander      Nkda [No Known Drug Allergies]      Seasonal Allergies      Weeds and mold     OBJECTIVE:                                                                 /80 (BP Location: Left arm, Patient Position: Sitting, Cuff Size: Adult Regular)   Pulse 106   Temp 97  F (36.1  C) (Oral)   Resp 18   Wt 91.4 kg (201 lb 8 oz)   SpO2 96%   BMI 35.69 kg/m          Physical Exam   Constitutional: She is oriented to person,  place, and time. No distress.   HENT:   Head: Normocephalic and atraumatic.   Right Ear: Tympanic membrane, external ear and ear canal normal.   Left Ear: Tympanic membrane, external ear and ear canal normal.   Nose: Septal deviation (mild, to the right) present. No mucosal edema or rhinorrhea.   Mouth/Throat: Oropharynx is clear and moist and mucous membranes are normal.   Eyes: Conjunctivae are normal. Right eye exhibits no discharge. Left eye exhibits no discharge.   Neck: Normal range of motion.   Cardiovascular: Normal rate, regular rhythm and normal heart sounds.   No murmur heard.  Pulmonary/Chest: Effort normal and breath sounds normal. No respiratory distress. She has no wheezes. She has no rales.   Musculoskeletal: Normal range of motion.   Lymphadenopathy:     She has no cervical adenopathy.   Neurological: She is alert and oriented to person, place, and time.   Skin: Skin is warm. No rash noted. She is not diaphoretic.   Nursing note and vitals reviewed.    ASSESSMENT/PLAN:      Problem List Items Addressed This Visit        Respiratory    1. Seasonal allergic rhinitis due to pollen - Primary  Currently well controlled with allergen immunotherapy.  The patient is satisfied with the efficacy of immunotherapy and would like to continue it further, for the next year.  -Anticipate immunotherapy until 3577-9931.    2. Allergic rhinitis due to mold    3. Allergic rhinitis due to animal dander    4. Allergic rhinitis due to dust mite      Other Visit Diagnoses     5. Need for prophylactic vaccination and inoculation against influenza        Relevant Orders    FLU VACCINE, SPLIT VIRUS, IM (QUADRIVALENT) [34727]- >3 YRS (Completed)    Vaccine Administration, Initial [57694] (Completed)        Return in about 1 year (around 1/3/2020), or if symptoms worsen or fail to improve.    Thank you for allowing us to participate in the care of this patient. Please feel free to contact us if there are any questions or  concerns about the patient.    Disclaimer: This note consists of symbols derived from keyboarding, dictation and/or voice recognition software. As a result, there may be errors in the script that have gone undetected. Please consider this when interpreting information found in this chart.    Rudy Shin MD, [facility]  Allergy, Asthma and Immunology  [place], [place] and [place]

## 2019-01-03 NOTE — PROGRESS NOTES

## 2019-01-10 ENCOUNTER — OFFICE VISIT (OUTPATIENT)
Dept: OBGYN | Facility: CLINIC | Age: 34
End: 2019-01-10
Payer: MEDICAID

## 2019-01-10 VITALS
DIASTOLIC BLOOD PRESSURE: 84 MMHG | WEIGHT: 195 LBS | HEART RATE: 109 BPM | BODY MASS INDEX: 34.55 KG/M2 | HEIGHT: 63 IN | SYSTOLIC BLOOD PRESSURE: 116 MMHG

## 2019-01-10 DIAGNOSIS — Z31.69 ENCOUNTER FOR PRECONCEPTION CONSULTATION: Primary | ICD-10-CM

## 2019-01-10 PROCEDURE — G0463 HOSPITAL OUTPT CLINIC VISIT: HCPCS | Mod: ZF

## 2019-01-10 ASSESSMENT — PAIN SCALES - GENERAL: PAINLEVEL: NO PAIN (0)

## 2019-01-10 ASSESSMENT — MIFFLIN-ST. JEOR: SCORE: 1553.64

## 2019-01-10 NOTE — PROGRESS NOTES
[facility]  Gynecology Clinic Note    HPI:    Maddy Ortiz is a 33 year old , here for a preconception counseling visit. She is accompanied by her partner Jay Jay, who is present and supportive. Maddy's medical history is pertinent for bipolar disorder and schizoaffective disorder. She is currently taking Latuda, Zyprexa and topamax. She was recently hospitalized in May 2018 for mental health concerns. Jay Jay was also recently hospitalized for mental health concerns this year. She follows with Dr. Montemayor for her mental health concerns. Maddy and Jay Jay are thinking about having a child and they would like to discuss pregnancy, stressors of pregnancy and raising a baby and the financial burden of having a child.     GYN History  - LMP: No LMP recorded. Patient is not currently having periods (Reason: IUD).  - Pap Smears:   3/27/18 NIL   Lab Results   Component Value Date    PAP NIL 2015    PAP NIL 2012     - Contraception: Mirena IUD  - Sexual Activity: yes with men    OBHx  , full term  in     PMHx:   Past Medical History:   Diagnosis Date     Bipolar 1 disorder (H) 2012     Depressive disorder      Paranoid type delusional disorder (H) 2012       PSHx:   Past Surgical History:   Procedure Laterality Date     TONSILLECTOMY & ADENOIDECTOMY      as a child       Meds:   Current Outpatient Medications   Medication     cetirizine (ZYRTEC) 10 MG tablet     ClonazePAM (KLONOPIN PO)     LATUDA 80 MG TABS tablet     levonorgestrel (MIRENA) 20 MCG/24HR IUD     OLANZapine (ZYPREXA) 20 MG tablet     ORDER FOR ALLERGEN IMMUNOTHERAPY     ORDER FOR ALLERGEN IMMUNOTHERAPY     ORDER FOR ALLERGEN IMMUNOTHERAPY     ORDER FOR ALLERGEN IMMUNOTHERAPY     ranitidine (ZANTAC) 150 MG tablet     topiramate (TOPAMAX) 25 MG tablet     traZODone (DESYREL) 50 MG tablet     No current facility-administered medications for this visit.      Allergies:    Allergies   Allergen Reactions     Animal  "Fay      Nkda [No Known Drug Allergies]      Seasonal Allergies      Weeds and mold       ROS: A 14 point review of systems was completed and was negative except for points mentioned in the HPI.       Physical Exam  /84   Pulse 109   Ht 1.6 m (5' 3\")   Wt 88.5 kg (195 lb)   Breastfeeding? No   BMI 34.54 kg/m    Gen: Well-appearing, NAD  HEENT: Normocephalic, atraumatic  CV:  Well perfused  Pulm: No increased work of breathing    --Ideal BMI: 18.5-24.9  Current BMI: Body mass index is 34.54 kg/m .  --Underweight = <18.5  --Normal weight = 18.5-24.9  --Overweight = 25-29.9  --Obesity = >30    Assessment/Plan  Maddy Ortiz is a 33 year old  female here for preconception counseling.   Discussed stressors of pregnancy including physical discomfort, nausea and vomiting and sleep disturbances. Discussed that the financial cost of pregnancy and delivery are dependent on insurance. Patients usually take 6 weeks to 3 months off from work to recover from delivery and care for the . Reviewed possible risk of birth defects with some psychiatric medications. These medications are not well studied in pregnancy and we are not sure of all the fetal effects.   After the baby is born, reviewed stresses of sleep deprivation and caring for a . Review some potential costs of formula, diapers, , clothing and education.     Strongly encouraged the patient and her partner to speak with their psychiatric providers and seek their insight if the patient and her partner are ready for parenthood, especially given recent hospitalizations for their mental health for each of the them.     Encouraged her to start a PNV.     Follow Up: as needed    Patient discussed with Dr. Torres.     Abigail Lobo MD PhD  Ob/Gyn PGY-3  1/10/2019 5:14 PM    The patient was seen in resident continuity clinic by Dr. Lobo.  I have reviewed the history and exam, the assessment and plan were jointly made.     Lisset" MD Brian, FACOG

## 2019-01-10 NOTE — NURSING NOTE
Chief Complaint   Patient presents with     Establish Care     Pre-pregnancy consult   Yanet Almaraz LPN

## 2019-01-11 LAB — PHQ9 SCORE: 24

## 2019-01-18 ENCOUNTER — PATIENT OUTREACH (OUTPATIENT)
Dept: CARE COORDINATION | Facility: CLINIC | Age: 34
End: 2019-01-18

## 2019-01-18 NOTE — PROGRESS NOTES
Clinic Care Coordination Contact Social Work     Situation: Patient chart reviewed by care coordinator.    Background: MH concerns  Insurance     Assessment: SW has been attempting to connect with pt since august of 2018. Each conversation has been brief with pt stating she is busy or cannot talk. Arranged times for a call, pt not available . Unable to Contact letters have been mailed with no response.     Plan/Recommendations: [facility] will plan no further outreach at this time.    Rosemary Beatty [facility]  , Clinic Care Coordination  Clinics:  [facility] [place], [place]  [phone]   1/18/2019   9:39 AM

## 2019-01-30 ENCOUNTER — ALLIED HEALTH/NURSE VISIT (OUTPATIENT)
Dept: ALLERGY | Facility: CLINIC | Age: 34
End: 2019-01-30
Payer: MEDICAID

## 2019-01-30 DIAGNOSIS — J30.9 ALLERGIC RHINITIS: Primary | ICD-10-CM

## 2019-01-30 PROCEDURE — 99207 ZZC DROP WITH A PROCEDURE: CPT

## 2019-01-30 PROCEDURE — 95117 IMMUNOTHERAPY INJECTIONS: CPT

## 2019-02-06 LAB — PHQ9 SCORE: 9

## 2019-03-05 ENCOUNTER — ALLIED HEALTH/NURSE VISIT (OUTPATIENT)
Dept: ALLERGY | Facility: CLINIC | Age: 34
End: 2019-03-05
Payer: MEDICAID

## 2019-03-05 DIAGNOSIS — J30.9 ALLERGIC RHINITIS: Primary | ICD-10-CM

## 2019-03-05 PROCEDURE — 95117 IMMUNOTHERAPY INJECTIONS: CPT

## 2019-03-05 PROCEDURE — 99207 ZZC DROP WITH A PROCEDURE: CPT

## 2019-03-05 NOTE — PROGRESS NOTES
Patient presented after waiting 30 minutes with no reaction to  injections. Discharged from clinic.      Simona Dleacruz RN

## 2019-03-08 ENCOUNTER — TELEPHONE (OUTPATIENT)
Dept: FAMILY MEDICINE | Facility: CLINIC | Age: 34
End: 2019-03-08

## 2019-03-09 NOTE — TELEPHONE ENCOUNTER
Clinic Action Needed:Yes  Reason for Call: Patient calling to report that the over the counter Zyrtec is make her feel drowsy.  Is there another allergy medication that you would recommend she take instead of the Zyrtec.  Please call patient to advise.  She may be reached at [phone] - best reached between noon and 4 p.m. Monday-Friday.    Routed to: [facility] Care Team    Ewelina Malcolm RN  [facility]

## 2019-03-11 NOTE — TELEPHONE ENCOUNTER
Left a message for patient to call back. Please let patient know she switch to Xyzal, this medication is over the counter. She may take 1/2 tablet to start and increase to full tablet depending on drowsiness and symptom control.    Simona Delacruz RN

## 2019-03-11 NOTE — TELEPHONE ENCOUNTER
Patient called back, gave her Dr. Acharya to switch to Xyzal along with instructions noted below. Advised patient to call back is she had any further questions, patient verbalized understanding.    Simona Delacruz RN

## 2019-03-11 NOTE — TELEPHONE ENCOUNTER
Sure, she may consider switching to Xyzal that is over-the-counter as well.  She may start with half tablet, and increase to full tablet depending on drowsiness effect and rhinoconjunctivitis symptom control.  Rudy Shin

## 2019-03-13 ENCOUNTER — TELEPHONE (OUTPATIENT)
Dept: ALLERGY | Facility: CLINIC | Age: 34
End: 2019-03-13

## 2019-03-13 ENCOUNTER — OFFICE VISIT (OUTPATIENT)
Dept: ALLERGY | Facility: CLINIC | Age: 34
End: 2019-03-13
Payer: MEDICAID

## 2019-03-13 VITALS
BODY MASS INDEX: 36.38 KG/M2 | OXYGEN SATURATION: 96 % | DIASTOLIC BLOOD PRESSURE: 84 MMHG | SYSTOLIC BLOOD PRESSURE: 120 MMHG | WEIGHT: 205.4 LBS | HEART RATE: 92 BPM

## 2019-03-13 DIAGNOSIS — J30.89 ALLERGIC RHINITIS DUE TO DUST MITE: ICD-10-CM

## 2019-03-13 DIAGNOSIS — J30.81 ALLERGIC RHINITIS DUE TO ANIMAL DANDER: ICD-10-CM

## 2019-03-13 DIAGNOSIS — J30.89 ALLERGIC RHINITIS DUE TO MOLD: ICD-10-CM

## 2019-03-13 DIAGNOSIS — J30.1 SEASONAL ALLERGIC RHINITIS DUE TO POLLEN: Primary | ICD-10-CM

## 2019-03-13 PROCEDURE — 99213 OFFICE O/P EST LOW 20 MIN: CPT | Performed by: ALLERGY & IMMUNOLOGY

## 2019-03-13 RX ORDER — TOPIRAMATE 100 MG/1
200 TABLET, FILM COATED ORAL AT BEDTIME
Refills: 3 | COMMUNITY
Start: 2019-03-05 | End: 2020-05-29 | Stop reason: ALTCHOICE

## 2019-03-13 RX ORDER — LEVOCETIRIZINE DIHYDROCHLORIDE 5 MG/1
5 TABLET, FILM COATED ORAL EVERY EVENING
Qty: 30 TABLET | Refills: 11 | Status: SHIPPED | OUTPATIENT
Start: 2019-03-13 | End: 2020-03-09 | Stop reason: SINTOL

## 2019-03-13 NOTE — TELEPHONE ENCOUNTER
Prior Authorization Retail Medication Request    Medication/Dose: levocetirizine 5mg tabs  ICD code (if different than what is on RX):    Previously Tried and Failed:    Rationale:      Insurance Name:  [place] [employer]  Insurance ID:  [id number]      Pharmacy Information (if different than what is on RX)  Name:    Phone:      ThanksSridevi  Certified Pharmacy Technician  [facility]  [phone]

## 2019-03-13 NOTE — PATIENT INSTRUCTIONS
If you have any questions regarding your allergies, asthma, or what we discussed during your visit today please call the allergy clinic or contact us via [employer].    [facility] ([place], [place]): [phone]      Start with 1/2 a tablet of levocetirizine (xyzal) and increase up to a full tablet if needed.    Continue with your other medications as prescribed

## 2019-03-13 NOTE — TELEPHONE ENCOUNTER
Date: 3/13/2019    Time of Call: 1:42 PM     Diagnosis: allergic rhinitis     [ VORB ] Ordering provider: Dr. Arizmendi  Order: Initiate PA for levocetirizine     Order received by: Mariella SWEENEY RN     Follow-up/additional notes:     Pt has sedation with Allegra; sedation and worsening depression with cetirizine.    Shirley Grant RN

## 2019-03-13 NOTE — LETTER
3/13/2019         RE: Maddy Ortiz  [address] [facility] [facility] [address]  [place] [address]        Dear Colleague,    Thank you for referring your patient, Maddy Ortiz, to the [facility]. Please see a copy of my visit note below.    Maddy Ortiz was seen in the Allergy Clinic at [facility]. The following are my recommendations regarding her Allergic Rhinitis Due to Animals, Allergic Rhinitis Due to Pollen, Allergic Rhinitis Due to Dust Mites and Allergic Rhinitis Due to Mold    1. Discontinue cetirizine  2. Begin levocetirizine 5mg daily  3. Continue allergen immunotherapy per protocol  4. Follow-up in 1 year      Maddy Ortiz is a 33 year old  female being seen today in consultation for allergies. She has previously been followed in the allergy clinic by both Dianelys Blevins and Aleida and she was most recently seen on 1/3/19. She started allergen immunotherapy treatment in 5/2017 and reached her maintenance dose in 7/2018. She has no history of significant large local or systemic reactions to treatment. Maddy feels her symptoms have improved with immunotherapy. After her most recent follow-up visit she began taking cetirizine but states that the medication has made her tired and worsened her depression. She used to take fexofenadine I the past but reports having sedation with this medication as well. Maddy would like to discuss alternative treatment options. Per review of her chart she was prescribed loratadine in 2012 and 2013 but this medication was discontinued. She does not recall why she stopped taking the medication or if she had any side effects with loratadine.      Past Medical History:   Diagnosis Date     Bipolar 1 disorder (H) 12/6/2012     Depressive disorder      Paranoid type delusional disorder (H) 11/14/2012     Family History   Adopted: Yes   Problem Relation Age of Onset     Unknown/Adopted Mother      Unknown/Adopted Father      Unknown/Adopted Other       Past Surgical History:   Procedure Laterality Date     TONSILLECTOMY & ADENOIDECTOMY      as a child       ENVIRONMENTAL HISTORY: [place]. The home is heated with a electric furnace. They do not have central air conditioning. The patient's bedroom is furnished with stuffed animals in bed, carpeting in bedroom and fabric window coverings. No pets inside the house. There is history of cockroach or mice infestation. There are no smokers in the house.  The house does not have a damp basement.     SOCIAL HISTORY:   Maddy is currently not working. She lives with her daughter.      REVIEW OF SYSTEMS:  General: negative for weight gain. negative for weight loss. negative for changes in sleep.   Eyes: negative for itching. negative for redness. negative for tearing/watering. negative for vision changes  Ears: negative for fullness. negative for hearing loss. negative for dizziness.   Nose: negative for snoring.negative for changes in smell. negative for drainage.   Throat: negative for hoarseness. negative for sore throat. negative for trouble swallowing.   Lungs: negative for cough. negative for shortness of breath.negative for wheezing. negative for sputum production.   Cardiovascular: negative for chest pain. negative for swelling of ankles. negative for fast or irregular heartbeat.   Gastrointestinal: negative for nausea. negative for heartburn. negative for acid reflux.   Musculoskeletal: negative for joint pain. negative for joint stiffness. negative for joint swelling.   Neurologic: negative for seizures. negative for fainting. negative for weakness.   Psychiatric: negative for changes in mood. negative for anxiety.   Endocrine: negative for cold intolerance. negative for heat intolerance. negative for tremors.   Hematologic: negative for easy bruising. negative for easy bleeding.  Integumentary: negative for rash. negative for scaling. negative for nail changes.        Current Outpatient Medications:      ClonazePAM (KLONOPIN PO), Take 1 mg by mouth daily as needed for anxiety , Disp: , Rfl:      LATUDA 80 MG TABS tablet, , Disp: , Rfl:      levocetirizine (XYZAL) 5 MG tablet, Take 1 tablet (5 mg) by mouth every evening, Disp: 30 tablet, Rfl: 11     levonorgestrel (MIRENA) 20 MCG/24HR IUD, 1 each (20 mcg) by Intrauterine route continuous, Disp: , Rfl:      OLANZapine (ZYPREXA) 20 MG tablet, Take 20 mg by mouth At Bedtime, Disp: , Rfl:      ORDER FOR ALLERGEN IMMUNOTHERAPY, Cat Hair, Standardized 10,000 BAU/mL, ALK  3.0 ml Dog Hair Dander, A. P.  1:100 w/v, HS  1.0 ml Dust Mites F 30,000AU/mL, HS  0.5 ml Dust Mites P. 30,000 AU/mL, HS  0.5 ml  Diluent: HSA qs to 5ml, Disp: 5 mL, Rfl: PRN     ORDER FOR ALLERGEN IMMUNOTHERAPY, Alternaria Tenuis 1:10 w/v, HS  0.5 ml Epicoccum Nigrum 1:10 w/v, HS 0.5 ml Hormodendrum Cladosporioides 1:10 w/v, HS 0.5 ml Diluent: HSA qs to 5ml, Disp: 5 mL, Rfl: PRN     ORDER FOR ALLERGEN IMMUNOTHERAPY, Estuardo, White  1:20 w/v, HS  0.5 ml Birch Mix PRW 1:20 w/v, HS  0.5 ml Elm, American 1:20 w/v, HS  0.5 ml Hackberry 1:20 w/v, HS 0.5 ml Hickory, Shagbark 1:20 w/v, HS  0.5 ml [place] Mix RW 1:20 w/v, HS 0.5 ml Oak Mix RVW 1:20 w/v, HS 0.5 ml [place] Tree, Black 1:20 w/v, HS 0.5 ml [place], Black 1:20 w/v, HS 0.5 ml Diluent: HSA qs to 5ml, Disp: 5 mL, Rfl: PRN     ORDER FOR ALLERGEN IMMUNOTHERAPY, Kochia 1:20 w/v, HS 1.0 ml Nettle 1:20 w/v, HS 1.0 ml Plantain, English 1:20 w/v, HS 1.0 ml Ragweed Mixed 1:20 w/v ALK  0.6 ml Russian Thistle 1:20 w/v, HS 1.0 ml Diluent: HSA qs to 5ml, Disp: 5 mL, Rfl: PRN     ranitidine (ZANTAC) 150 MG tablet, Take 150 mg by mouth At Bedtime, Disp: , Rfl:      traZODone (DESYREL) 50 MG tablet, Take 50 mg by mouth nightly as needed for sleep , Disp: 30 tablet, Rfl: 3     cetirizine (ZYRTEC) 10 MG tablet, Take 10 mg by mouth 2 times daily as needed for allergies, Disp: , Rfl:      topiramate (TOPAMAX) 100 MG tablet, 200 mg At  Bedtime, Disp: , Rfl: 3  Immunization History   Administered Date(s) Administered     Influenza (IIV3) PF 12/06/2012     Influenza Vaccine IM 3yrs+ 4 Valent IIV4 11/21/2017, 01/03/2019     TDAP Vaccine (Adacel) 12/06/2012     Allergies   Allergen Reactions     Animal Dander      Nkda [No Known Drug Allergies]      Seasonal Allergies      Weeds and mold       EXAM:   /84 (BP Location: Left arm, Patient Position: Sitting, Cuff Size: Adult Large)   Pulse 92   Wt 93.2 kg (205 lb 6.4 oz)   SpO2 96%   BMI 36.38 kg/m     GENERAL APPEARANCE: cooperative, not in distress and appears tired  SKIN: no rashes, no lesions  HEAD: atraumatic, normocephalic  EYES: lids and lashes normal, conjunctivae and sclerae clear, pupils equal, round, reactive to light, EOM full and intact  ENT: no scars or lesions, nasal exam showed no discharge, swelling or lesions noted, otoscopy showed external auditory canals clear, tympanic membranes normal, tongue midline and normal, soft palate, uvula, and tonsils normal  NECK: no asymmetry, masses, or scars, supple without significant adenopathy  LUNGS: unlabored respirations, no intercostal retractions or accessory muscle use, clear to auscultation without rales or wheezes  HEART: regular rate and rhythm without murmurs and normal S1 and S2  MUSCULOSKELETAL: no musculoskeletal defects are noted  NEURO: no focal deficits noted  PSYCH: does not appear depressed or anxious    WORKUP: None    ASSESSMENT/PLAN:  Maddy Ortiz is a 33 year old female here for evaluation of allergic rhinitis. She is currently on allergen immunotherapy treatment and reports that she has been doing well. She did resume antihistamine therapy with cetirizine after her last clinic visit but reports side effects of sedation and worsening depression. We discussed alternative medications as well as potential side effects, including sedation.     1. Discontinue cetirizine  2. Begin levocetirizine 5mg daily  3. Continue  allergen immunotherapy per protocol  4. Follow-up in 1 year      Arleth Arizmendi MD  Allergy/Immunology  [place], [place]      Chart documentation done in part with Dragon Voice Recognition Software. Although reviewed after completion, some word and grammatical errors may remain.    Again, thank you for allowing me to participate in the care of your patient.        Sincerely,        Arleth Arizmendi MD

## 2019-03-13 NOTE — PROGRESS NOTES
Maddy Ortiz was seen in the Allergy Clinic at [facility]. The following are my recommendations regarding her Allergic Rhinitis Due to Animals, Allergic Rhinitis Due to Pollen, Allergic Rhinitis Due to Dust Mites and Allergic Rhinitis Due to Mold    1. Discontinue cetirizine  2. Begin levocetirizine 5mg daily  3. Continue allergen immunotherapy per protocol  4. Follow-up in 1 year      Maddy Ortiz is a 33 year old  female being seen today in consultation for allergies. She has previously been followed in the allergy clinic by both Dianelys Blevins and Aleida and she was most recently seen on 1/3/19. She started allergen immunotherapy treatment in 5/2017 and reached her maintenance dose in 7/2018. She has no history of significant large local or systemic reactions to treatment. Maddy feels her symptoms have improved with immunotherapy. After her most recent follow-up visit she began taking cetirizine but states that the medication has made her tired and worsened her depression. She used to take fexofenadine I the past but reports having sedation with this medication as well. Maddy would like to discuss alternative treatment options. Per review of her chart she was prescribed loratadine in 2012 and 2013 but this medication was discontinued. She does not recall why she stopped taking the medication or if she had any side effects with loratadine.      Past Medical History:   Diagnosis Date     Bipolar 1 disorder (H) 12/6/2012     Depressive disorder      Paranoid type delusional disorder (H) 11/14/2012     Family History   Adopted: Yes   Problem Relation Age of Onset     Unknown/Adopted Mother      Unknown/Adopted Father      Unknown/Adopted Other      Past Surgical History:   Procedure Laterality Date     TONSILLECTOMY & ADENOIDECTOMY      as a child       ENVIRONMENTAL HISTORY: [place] The home is heated with a electric furnace. They do not have  central air conditioning. The patient's bedroom is furnished with stuffed animals in bed, carpeting in bedroom and fabric window coverings. No pets inside the house. There is history of cockroach or mice infestation. There are no smokers in the house.  The house does not have a damp basement.     SOCIAL HISTORY:   Maddy is currently not working. She lives with her daughter.      REVIEW OF SYSTEMS:  General: negative for weight gain. negative for weight loss. negative for changes in sleep.   Eyes: negative for itching. negative for redness. negative for tearing/watering. negative for vision changes  Ears: negative for fullness. negative for hearing loss. negative for dizziness.   Nose: negative for snoring.negative for changes in smell. negative for drainage.   Throat: negative for hoarseness. negative for sore throat. negative for trouble swallowing.   Lungs: negative for cough. negative for shortness of breath.negative for wheezing. negative for sputum production.   Cardiovascular: negative for chest pain. negative for swelling of ankles. negative for fast or irregular heartbeat.   Gastrointestinal: negative for nausea. negative for heartburn. negative for acid reflux.   Musculoskeletal: negative for joint pain. negative for joint stiffness. negative for joint swelling.   Neurologic: negative for seizures. negative for fainting. negative for weakness.   Psychiatric: negative for changes in mood. negative for anxiety.   Endocrine: negative for cold intolerance. negative for heat intolerance. negative for tremors.   Hematologic: negative for easy bruising. negative for easy bleeding.  Integumentary: negative for rash. negative for scaling. negative for nail changes.       Current Outpatient Medications:      ClonazePAM (KLONOPIN PO), Take 1 mg by mouth daily as needed for anxiety , Disp: , Rfl:      LATUDA 80 MG TABS tablet, , Disp: , Rfl:      levocetirizine (XYZAL) 5 MG tablet, Take 1 tablet (5 mg) by mouth every  evening, Disp: 30 tablet, Rfl: 11     levonorgestrel (MIRENA) 20 MCG/24HR IUD, 1 each (20 mcg) by Intrauterine route continuous, Disp: , Rfl:      OLANZapine (ZYPREXA) 20 MG tablet, Take 20 mg by mouth At Bedtime, Disp: , Rfl:      ORDER FOR ALLERGEN IMMUNOTHERAPY, Cat Hair, Standardized 10,000 BAU/mL, ALK  3.0 ml Dog Hair Dander, A. P.  1:100 w/v, HS  1.0 ml Dust Mites F 30,000AU/mL, HS  0.5 ml Dust Mites P. 30,000 AU/mL, HS  0.5 ml  Diluent: HSA qs to 5ml, Disp: 5 mL, Rfl: PRN     ORDER FOR ALLERGEN IMMUNOTHERAPY, Alternaria Tenuis 1:10 w/v, HS  0.5 ml Epicoccum Nigrum 1:10 w/v, HS 0.5 ml Hormodendrum Cladosporioides 1:10 w/v, HS 0.5 ml Diluent: HSA qs to 5ml, Disp: 5 mL, Rfl: PRN     ORDER FOR ALLERGEN IMMUNOTHERAPY, Estuardo, White  1:20 w/v, HS  0.5 ml Birch Mix PRW 1:20 w/v, HS  0.5 ml Elm, American 1:20 w/v, HS  0.5 ml Hackberry 1:20 w/v, HS 0.5 ml Hickory, Shagbark 1:20 w/v, HS  0.5 ml [place] Mix RW 1:20 w/v, HS 0.5 ml Oak Mix RVW 1:20 w/v, HS 0.5 ml [place] Tree, Black 1:20 w/v, HS 0.5 ml [place], Black 1:20 w/v, HS 0.5 ml Diluent: HSA qs to 5ml, Disp: 5 mL, Rfl: PRN     ORDER FOR ALLERGEN IMMUNOTHERAPY, Kochia 1:20 w/v, HS 1.0 ml Nettle 1:20 w/v, HS 1.0 ml Plantain, English 1:20 w/v, HS 1.0 ml Ragweed Mixed 1:20 w/v ALK  0.6 ml Russian Thistle 1:20 w/v, HS 1.0 ml Diluent: HSA qs to 5ml, Disp: 5 mL, Rfl: PRN     ranitidine (ZANTAC) 150 MG tablet, Take 150 mg by mouth At Bedtime, Disp: , Rfl:      traZODone (DESYREL) 50 MG tablet, Take 50 mg by mouth nightly as needed for sleep , Disp: 30 tablet, Rfl: 3     cetirizine (ZYRTEC) 10 MG tablet, Take 10 mg by mouth 2 times daily as needed for allergies, Disp: , Rfl:      topiramate (TOPAMAX) 100 MG tablet, 200 mg At Bedtime, Disp: , Rfl: 3  Immunization History   Administered Date(s) Administered     Influenza (IIV3) PF 12/06/2012     Influenza Vaccine IM 3yrs+ 4 Valent IIV4 11/21/2017, 01/03/2019     TDAP Vaccine (Adacel) 12/06/2012     Allergies   Allergen Reactions      Animal Dander      Nkda [No Known Drug Allergies]      Seasonal Allergies      Weeds and mold       EXAM:   /84 (BP Location: Left arm, Patient Position: Sitting, Cuff Size: Adult Large)   Pulse 92   Wt 93.2 kg (205 lb 6.4 oz)   SpO2 96%   BMI 36.38 kg/m    GENERAL APPEARANCE: cooperative, not in distress and appears tired  SKIN: no rashes, no lesions  HEAD: atraumatic, normocephalic  EYES: lids and lashes normal, conjunctivae and sclerae clear, pupils equal, round, reactive to light, EOM full and intact  ENT: no scars or lesions, nasal exam showed no discharge, swelling or lesions noted, otoscopy showed external auditory canals clear, tympanic membranes normal, tongue midline and normal, soft palate, uvula, and tonsils normal  NECK: no asymmetry, masses, or scars, supple without significant adenopathy  LUNGS: unlabored respirations, no intercostal retractions or accessory muscle use, clear to auscultation without rales or wheezes  HEART: regular rate and rhythm without murmurs and normal S1 and S2  MUSCULOSKELETAL: no musculoskeletal defects are noted  NEURO: no focal deficits noted  PSYCH: does not appear depressed or anxious    WORKUP: None    ASSESSMENT/PLAN:  Maddy Ortiz is a 33 year old female here for evaluation of allergic rhinitis. She is currently on allergen immunotherapy treatment and reports that she has been doing well. She did resume antihistamine therapy with cetirizine after her last clinic visit but reports side effects of sedation and worsening depression. We discussed alternative medications as well as potential side effects, including sedation.     1. Discontinue cetirizine  2. Begin levocetirizine 5mg daily  3. Continue allergen immunotherapy per protocol  4. Follow-up in 1 year      Arleth Arizmendi MD  Allergy/Immunology  [place], [place]      Chart documentation done in part with Dragon Voice Recognition Software. Although reviewed after completion, some  word and grammatical errors may remain.

## 2019-03-13 NOTE — TELEPHONE ENCOUNTER
Reason for Call:  Other prescription    Detailed comments: pt at pharmacy now.  Levocetirizine 5mg is not covered by pt's insurance.    Wondering if you would like to try for something else or try a prior auth?    Phone Number Patient can be reached at: [phone] Shellie    Best Time:     Can we leave a detailed message on this number? YES    Call taken on 3/13/2019 at 1:15 PM by Dena Dominguez

## 2019-03-18 NOTE — TELEPHONE ENCOUNTER
PA Initiation    Medication: LEVOCETIRIZINE- INITIATED  Insurance Company[place] - Phone [phone] Fax [phone]  Pharmacy Filling the Rx: [facility] - [place], [place] - [address]  Filling Pharmacy Phone: [phone]  Filling Pharmacy Fax:    Start Date: 3/18/2019

## 2019-03-20 NOTE — TELEPHONE ENCOUNTER
PRIOR AUTHORIZATION DENIED    Medication: LEVOCETIRIZINE- DENIED    Denial Date: 3/20/2019    Denial Rational: Patient must have tried cetirizine AND loratadine for at least 2 weeks in the last 6 months.    Appeal Information: If provider would like to appeal we will need a detailed letter of medical necessity to start the process. Then re-route this request back to the PA pool.

## 2019-04-02 ENCOUNTER — COMMUNICATION - HEALTHEAST (OUTPATIENT)
Dept: SURGERY | Facility: CLINIC | Age: 34
End: 2019-04-02

## 2019-04-03 ENCOUNTER — PATIENT OUTREACH (OUTPATIENT)
Dept: CARE COORDINATION | Facility: CLINIC | Age: 34
End: 2019-04-03

## 2019-04-03 NOTE — PROGRESS NOTES
Clinic Care Coordination Contact  Voicemail- Social Work - Late entry      Voice mail received from patient on 3/16/19, while SW was out of office.    Pt was closed to Care Coordination on 1/18/19 after no response to calls and letter.     Her message left on 3/16/19 when I was on vacation, stated she found my letter, and left the following information.:     Completed treatment at [facility] but will follow for support.  She might possibly attend an intensive out pt program.  She will receive Medication Management with Oscar TERRELL at [facility], .   Has an [facility] worker, Blaise Palmer who comes every Tuesday.   , Anurag Vera  Has been approved for Social Security,  which will start  In April . Has an appt to apply for Disability.    Her messages said she has good support and has no other needs.     Plan: No further outreach planned at this time.     Rosemary Beatty [facility]  , Clinic Care Coordination  Clinics:  [facility] [place], [place]  [phone]   4/3/2019   4:35 PM

## 2019-04-04 RX ORDER — LORATADINE 10 MG/1
10 TABLET ORAL DAILY
Qty: 30 TABLET | Refills: 11 | Status: SHIPPED | OUTPATIENT
Start: 2019-04-04 | End: 2020-03-09 | Stop reason: SINTOL

## 2019-04-04 NOTE — TELEPHONE ENCOUNTER
Please call patient and let her know insurance requires her to try loratadine first - she must try and fail both cetirizine and loratadine within a 6 month period. Rx pended to be routed to her preferred pharmacy.

## 2019-04-04 NOTE — TELEPHONE ENCOUNTER
PRIOR AUTHORIZATION was denied, please see reasoning below.  Change med or write letter of medical necessity?  Please advise.  Shirley Grant RN

## 2019-04-04 NOTE — TELEPHONE ENCOUNTER
Left message for patient to call us back.  Please inform patient of message and update pharmacy.  Shirley Grant RN

## 2019-04-04 NOTE — TELEPHONE ENCOUNTER
Pt calling back stating she has tried the zyrtec in the past and it makes her too tired. She will try the loratadine.   Please send in rx    Shellie Chowdhury  Specialty CSS

## 2019-04-23 ENCOUNTER — ALLIED HEALTH/NURSE VISIT (OUTPATIENT)
Dept: ALLERGY | Facility: CLINIC | Age: 34
End: 2019-04-23
Payer: MEDICAID

## 2019-04-23 DIAGNOSIS — J30.9 ALLERGIC RHINITIS: Primary | ICD-10-CM

## 2019-04-23 PROCEDURE — 99207 ZZC DROP WITH A PROCEDURE: CPT

## 2019-04-23 PROCEDURE — 95117 IMMUNOTHERAPY INJECTIONS: CPT

## 2019-05-01 ENCOUNTER — OFFICE VISIT (OUTPATIENT)
Dept: FAMILY MEDICINE | Facility: CLINIC | Age: 34
End: 2019-05-01
Payer: MEDICAID

## 2019-05-01 VITALS
SYSTOLIC BLOOD PRESSURE: 126 MMHG | BODY MASS INDEX: 36.86 KG/M2 | WEIGHT: 208 LBS | DIASTOLIC BLOOD PRESSURE: 80 MMHG | TEMPERATURE: 97.9 F | HEIGHT: 63 IN | OXYGEN SATURATION: 99 % | HEART RATE: 101 BPM

## 2019-05-01 DIAGNOSIS — M54.50 ACUTE MIDLINE LOW BACK PAIN WITHOUT SCIATICA: Primary | ICD-10-CM

## 2019-05-01 PROCEDURE — 99213 OFFICE O/P EST LOW 20 MIN: CPT | Performed by: NURSE PRACTITIONER

## 2019-05-01 RX ORDER — OLANZAPINE 10 MG/1
5 TABLET ORAL
Refills: 3 | COMMUNITY
Start: 2019-04-15 | End: 2020-08-13

## 2019-05-01 RX ORDER — LURASIDONE HYDROCHLORIDE 40 MG/1
40 TABLET, FILM COATED ORAL
Refills: 3 | COMMUNITY
Start: 2019-04-13 | End: 2020-08-13

## 2019-05-01 ASSESSMENT — ASTHMA QUESTIONNAIRES
ACT_TOTALSCORE: 24
QUESTION_5 LAST FOUR WEEKS HOW WOULD YOU RATE YOUR ASTHMA CONTROL: WELL CONTROLLED
QUESTION_4 LAST FOUR WEEKS HOW OFTEN HAVE YOU USED YOUR RESCUE INHALER OR NEBULIZER MEDICATION (SUCH AS ALBUTEROL): NOT AT ALL
QUESTION_1 LAST FOUR WEEKS HOW MUCH OF THE TIME DID YOUR ASTHMA KEEP YOU FROM GETTING AS MUCH DONE AT WORK, SCHOOL OR AT HOME: NONE OF THE TIME
QUESTION_2 LAST FOUR WEEKS HOW OFTEN HAVE YOU HAD SHORTNESS OF BREATH: NOT AT ALL
QUESTION_3 LAST FOUR WEEKS HOW OFTEN DID YOUR ASTHMA SYMPTOMS (WHEEZING, COUGHING, SHORTNESS OF BREATH, CHEST TIGHTNESS OR PAIN) WAKE YOU UP AT NIGHT OR EARLIER THAN USUAL IN THE MORNING: NOT AT ALL

## 2019-05-01 ASSESSMENT — MIFFLIN-ST. JEOR: SCORE: 1612.61

## 2019-05-01 NOTE — LETTER
My Depression Action Plan  Name: Maddy Ortiz   Date of Birth 1985  Date: 5/1/2019    My doctor: Shirley Freeman   My clinic: [facility]  [address] [address]  [phone]          GREEN    ZONE   Good Control    What it looks like:     Things are going generally well. You have normal up s and down s. You may even feel depressed from time to time, but bad moods usually last less than a day.   What you need to do:  1. Continue to care for yourself (see self care plan)  2. Check your depression survival kit and update it as needed  3. Follow your physician s recommendations including any medication.  4. Do not stop taking medication unless you consult with your physician first.           YELLOW         ZONE Getting Worse    What it looks like:     Depression is starting to interfere with your life.     It may be hard to get out of bed; you may be starting to isolate yourself from others.    Symptoms of depression are starting to last most all day and this has happened for several days.     You may have suicidal thoughts but they are not constant.   What you need to do:     1. Call your care team, your response to treatment will improve if you keep your care team informed of your progress. Yellow periods are signs an adjustment may need to be made.     2. Continue your self-care, even if you have to fake it!    3. Talk to someone in your support network    4. Open up your depression survival kit           RED    ZONE Medical Alert - Get Help    What it looks like:     Depression is seriously interfering with your life.     You may experience these or other symptoms: You can t get out of bed most days, can t work or engage in other necessary activities, you have trouble taking care of basic hygiene, or basic responsibilities, thoughts of suicide or death that will not go away, self-injurious behavior.     What you need to do:  1. Call your  care team and request a same-day appointment. If they are not available (weekends or after hours) call your local crisis line, emergency room or 911.            Depression Self Care Plan / Survival Kit    Self-Care for Depression  Here s the deal. Your body and mind are really not as separate as most people think.  What you do and think affects how you feel and how you feel influences what you do and think. This means if you do things that people who feel good do, it will help you feel better.  Sometimes this is all it takes.  There is also a place for medication and therapy depending on how severe your depression is, so be sure to consult with your medical provider and/ or Behavioral Health Consultant if your symptoms are worsening or not improving.     In order to better manage my stress, I will:    Exercise  Get some form of exercise, every day. This will help reduce pain and release endorphins, the  feel good  chemicals in your brain. This is almost as good as taking antidepressants!  This is not the same as joining a gym and then never going! (they count on that by the way ) It can be as simple as just going for a walk or doing some gardening, anything that will get you moving.      Hygiene   Maintain good hygiene (Get out of bed in the morning, Make your bed, Brush your teeth, Take a shower, and Get dressed like you were going to work, even if you are unemployed).  If your clothes don't fit try to get ones that do.    Diet  I will strive to eat foods that are good for me, drink plenty of water, and avoid excessive sugar, caffeine, alcohol, and other mood-altering substances.  Some foods that are helpful in depression are: complex carbohydrates, B vitamins, flaxseed, fish or fish oil, fresh fruits and vegetables.    Psychotherapy  I agree to participate in Individual Therapy (if recommended).    Medication  If prescribed medications, I agree to take them.  Missing doses can result in serious side effects.  I  understand that drinking alcohol, or other illicit drug use, may cause potential side effects.  I will not stop my medication abruptly without first discussing it with my provider.    Staying Connected With Others  I will stay in touch with my friends, family members, and my primary care provider/team.    Use your imagination  Be creative.  We all have a creative side; it doesn t matter if it s oil painting, sand castles, or mud pies! This will also kick up the endorphins.    Witness Beauty  (AKA stop and smell the roses) Take a look outside, even in mid-winter. Notice colors, textures. Watch the squirrels and birds.     Service to others  Be of service to others.  There is always someone else in need.  By helping others we can  get out of ourselves  and remember the really important things.  This also provides opportunities for practicing all the other parts of the program.    Humor  Laugh and be silly!  Adjust your TV habits for less news and crime-drama and more comedy.    Control your stress  Try breathing deep, massage therapy, biofeedback, and meditation. Find time to relax each day.     My support system    Clinic Contact:  Phone number:    Contact 1:  Phone number:    Contact 2:  Phone number:    Congregation/:  Phone number:    Therapist:  Phone number:    Local crisis center:    Phone number:    Other community support:  Phone number:

## 2019-05-01 NOTE — PATIENT INSTRUCTIONS
Thank you for choosing [facility].  You may be receiving an email and/or telephone survey request from [facility] Customer Experience regarding your visit today.  Please take a few minutes to respond to the survey to let us know how we are doing.      If you have questions or concerns, please contact us via [employer] or you can contact your care team at [phone].    Our Clinic hours are:  Monday 6:40 am  to 7:00 pm  Tuesday -Friday 6:40 am to 5:00 pm    The [place] outpatient lab hours are:  Monday - Friday 6:10 am to 4:45 pm  Saturdays 7:00 am to 11:00 am  Appointments are required, call [phone]    If you have clinical questions after hours or would like to schedule an appointment,  call the clinic at [phone].

## 2019-05-01 NOTE — PROGRESS NOTES
"  SUBJECTIVE:   Maddy Ortiz is a 33 year old female who presents to clinic today for the following   health issues:      Back Pain       Duration: \"long time\"        Specific cause: none  Only hurts with sitting - no pain with standing or walking    Description:   Location of pain: one spot in lower lumbar spine  Stiff and sore  She thinks it is getting worse- possibly due to her medication?  Character of pain: can't describe pain- other than saying it is stiff and sore;  intermittent  Pain radiation:none  New numbness or weakness in legs, not attributed to pain:  no     Intensity: severe    History:   Pain interferes with job: Not applicable  History of back problems: yes  Any previous MRI or X-rays: None  Sees a specialist for back pain:  No  Therapies tried without relief: nothing  Just tries not to sit too long    Alleviating factors:   Improved by: staying active      Precipitating factors:  Worsened by: Sitting for long period of time      Accompanying Signs & Symptoms:  Risk of Fracture:  None  Risk of Cauda Equina:  None  Risk of Infection:  None  Risk of Cancer:  None  Risk of Ankylosing Spondylitis:  Onset at age <35, male, AND morning back stiffness. no              Additional history: as documented    Reviewed  and updated as needed this visit by clinical staff  Tobacco  Allergies  Meds  Med Hx  Surg Hx  Fam Hx  Soc Hx        Reviewed and updated as needed this visit by Provider           ROS:  Constitutional, HEENT, cardiovascular, pulmonary, gi and gu systems are negative, except as otherwise noted.    OBJECTIVE:     /80 (BP Location: Right arm)   Pulse 101   Temp 97.9  F (36.6  C) (Tympanic)   Ht 1.6 m (5' 3\")   Wt 94.3 kg (208 lb)   SpO2 99%   BMI 36.85 kg/m    Body mass index is 36.85 kg/m .  GENERAL: healthy, alert and no distress  Comprehensive back pain exam:  No tenderness, Range of motion not limited by pain, Lower extremity strength functional and equal on both sides, Lower " extremity reflexes within normal limits bilaterally, Lower extremity sensation normal and equal on both sides and Straight leg raise negative bilaterally    ASSESSMENT/PLAN:       ICD-10-CM    1. Acute midline low back pain without sciatica M54.5 PHYSICAL THERAPY REFERRAL       Ice or heat as preferred at least twice daily.   NSAID (ibuprofen 600 mg every 6 hours or aleve 2 tabs twice daily) for pain and inflammation.    Schedule PT      The risks, benefits and treatment options of prescribed medications or other treatments have been discussed with the patient. The patient verbalized their understanding and should call or follow up if no improvement or if they develop further problems.    PHILL Yan CNP  [facility]

## 2019-05-02 ASSESSMENT — ASTHMA QUESTIONNAIRES: ACT_TOTALSCORE: 24

## 2019-05-06 ENCOUNTER — ALLIED HEALTH/NURSE VISIT (OUTPATIENT)
Dept: ALLERGY | Facility: CLINIC | Age: 34
End: 2019-05-06
Payer: MEDICAID

## 2019-05-06 ENCOUNTER — TELEPHONE (OUTPATIENT)
Dept: ALLERGY | Facility: CLINIC | Age: 34
End: 2019-05-06

## 2019-05-06 DIAGNOSIS — J30.89 ALLERGIC RHINITIS DUE TO DUST MITE: ICD-10-CM

## 2019-05-06 DIAGNOSIS — J30.89 ALLERGIC RHINITIS DUE TO MOLD: ICD-10-CM

## 2019-05-06 DIAGNOSIS — J30.81 ALLERGIC RHINITIS DUE TO ANIMAL DANDER: ICD-10-CM

## 2019-05-06 DIAGNOSIS — J30.9 ALLERGIC RHINITIS: Primary | ICD-10-CM

## 2019-05-06 DIAGNOSIS — J30.1 SEASONAL ALLERGIC RHINITIS DUE TO POLLEN: Primary | ICD-10-CM

## 2019-05-06 PROCEDURE — 95117 IMMUNOTHERAPY INJECTIONS: CPT

## 2019-05-06 PROCEDURE — 99207 ZZC DROP WITH A PROCEDURE: CPT

## 2019-05-06 NOTE — TELEPHONE ENCOUNTER
It seems that the patient has been managed by Dr. Arizmendi.  Please ask the patient what is her preference to follow-up in the future.  Rudy Shin

## 2019-05-06 NOTE — TELEPHONE ENCOUNTER
Patient is requesting a antihistamine that is a nasal spray.  Patient is currently taking claritin1 tablet daily, she states it makes her tired.  Patient states tried Allegra and Zyrtec which also make her tired.  Patient states the Xyzal was not covered by insurance.    Patient would like Rx sent to [facility]    Please advise    Simona Delacruz RN

## 2019-05-07 RX ORDER — AZELASTINE 1 MG/ML
2 SPRAY, METERED NASAL 2 TIMES DAILY
Qty: 1 BOTTLE | Refills: 5 | Status: SHIPPED | OUTPATIENT
Start: 2019-05-07 | End: 2020-03-09

## 2019-05-07 RX ORDER — AZELASTINE 1 MG/ML
2 SPRAY, METERED NASAL 2 TIMES DAILY
Qty: 1 BOTTLE | Refills: 5 | Status: SHIPPED | OUTPATIENT
Start: 2019-05-07 | End: 2019-05-07

## 2019-05-07 NOTE — TELEPHONE ENCOUNTER
Pt called and relayed providers recommendations. Pt requesting that Rx be sent to [facility] in [place] instead as Pt does not have a vehicle at this time.       Elaine Sparks CMA

## 2019-05-07 NOTE — TELEPHONE ENCOUNTER
Rx sent for azelastine nasal spray to [facility] pharmacy. She may use 2 sprays in each nostril once to twice per day.

## 2019-05-09 ENCOUNTER — HOSPITAL ENCOUNTER (OUTPATIENT)
Dept: PHYSICAL THERAPY | Facility: CLINIC | Age: 34
Setting detail: THERAPIES SERIES
End: 2019-05-09
Attending: NURSE PRACTITIONER
Payer: MEDICAID

## 2019-05-09 DIAGNOSIS — M54.50 ACUTE MIDLINE LOW BACK PAIN WITHOUT SCIATICA: ICD-10-CM

## 2019-05-09 PROCEDURE — 97162 PT EVAL MOD COMPLEX 30 MIN: CPT | Mod: GP | Performed by: PHYSICAL THERAPIST

## 2019-05-09 PROCEDURE — 97140 MANUAL THERAPY 1/> REGIONS: CPT | Mod: GP | Performed by: PHYSICAL THERAPIST

## 2019-05-09 PROCEDURE — 97110 THERAPEUTIC EXERCISES: CPT | Mod: GP | Performed by: PHYSICAL THERAPIST

## 2019-05-09 NOTE — PROGRESS NOTES
[facility]          OUTPATIENT PHYSICAL THERAPY ORTHOPEDIC EVALUATION  PLAN OF TREATMENT FOR OUTPATIENT REHABILITATION  (COMPLETE FOR INITIAL CLAIMS ONLY)  Patient's Last Name, First Name, M.I.  YOB: 1985  AngelMaddy  MARYSOL    Provider s Name:  [facility]   Medical Record No.  [id number]   Start of Care Date:  05/09/19   Onset Date:  03/09/19   Type:     _X__PT   ___OT   ___SLP Medical Diagnosis:  Acute midline low back pain without sciatica      PT Diagnosis:  SI dysfunction   Visits from SOC:  1      _________________________________________________________________________________  Plan of Treatment/Functional Goals:  joint mobilization, manual therapy, neuromuscular re-education, ROM, strengthening, stretching           Goals  Goal Identifier: 1  Goal Description:  Patient can go from Sit<>stand without pain.  Target Date: 06/20/19    Goal Identifier: 2  Goal Description: Patient can sit for x>30 minutes without pain  Target Date: 06/20/19    Goal Identifier: 3  Goal Description: Patient will be IND with HEP in order to aid in recovery  Target Date: 06/20/19                                                           Therapy Frequency:  1 time/week  Predicted Duration of Therapy Intervention:  6 weeks    Gladis Domingo, PT                 I CERTIFY THE NEED FOR THESE SERVICES FURNISHED UNDER        THIS PLAN OF TREATMENT AND WHILE UNDER MY CARE     (Physician co-signature of this document indicates review and certification of the therapy plan).                       Certification Date From:  05/09/19   Certification Date To:  06/20/19    Referring Provider:  Shirley Freeman    Initial Assessment        See Epic Evaluation Start of Care Date: 05/09/19

## 2019-05-09 NOTE — PROGRESS NOTES
[facility]          OUTPATIENT PHYSICAL THERAPY ORTHOPEDIC EVALUATION  PLAN OF TREATMENT FOR OUTPATIENT REHABILITATION  (COMPLETE FOR INITIAL CLAIMS ONLY)  Patient's Last Name, First Name, M.I.  YOB: 1985  AngelMaddy  MARYSOL    Provider s Name:  [facility]   Medical Record No.  [id number]   Start of Care Date:  05/09/19   Onset Date:  03/09/19   Type:     _X__PT   ___OT   ___SLP Medical Diagnosis:  Acute midline low back pain without sciatica      PT Diagnosis:  SI dysfunction   Visits from SOC:  1      _________________________________________________________________________________  Plan of Treatment/Functional Goals:  joint mobilization, manual therapy, neuromuscular re-education, ROM, strengthening, stretching           Goals  Goal Identifier: 1  Goal Description:  Patient can go from Sit<>stand without pain.  Target Date: 06/20/19    Goal Identifier: 2  Goal Description: Patient can sit for x>30 minutes without pain  Target Date: 06/20/19    Goal Identifier: 3  Goal Description: Patient will be IND with HEP in order to aid in recovery  Target Date: 06/20/19                                                           Therapy Frequency:  1 time/week  Predicted Duration of Therapy Intervention:  6 weeks    Gladis Domingo, PT                 I CERTIFY THE NEED FOR THESE SERVICES FURNISHED UNDER        THIS PLAN OF TREATMENT AND WHILE UNDER MY CARE     (Physician co-signature of this document indicates review and certification of the therapy plan).                       Certification Date From:  05/09/19   Certification Date To:  06/20/19      Referring Provider:  Shirley Freeman    Initial Assessment        See Epic Evaluation Start of Care Date: 05/09/19

## 2019-05-09 NOTE — PROGRESS NOTES
05/09/19 1400   General Information   Type of Visit Initial OP Ortho PT Evaluation   Start of Care Date 05/09/19   Referring Physician Shirley Freeman   Patient/Family Goals Statement To have less pain   Orders Evaluate and Treat   Date of Order 05/01/19   Certification Required? Yes   Medical Diagnosis Acute midline low back pain without sciatica    Surgical/Medical history reviewed Yes   Precautions/Limitations no known precautions/limitations   Body Part(s)   Body Part(s) Lumbar Spine/SI   Presentation and Etiology   Pertinent history of current problem (include personal factors and/or comorbidities that impact the POC) Patient indicates that she experiences pain with sitting in low back near pelvis, this has been happening for ~2 years, getting worse recently. Patient been seen for overactive bladder by PT. Sitting and traveling is when the pain is worst.   Impairments A. Pain;B. Decreased WB tolerance;E. Decreased flexibility   Functional Limitations perform activities of daily living   Symptom Location Pain primarily in low back, does not travel, no sciatica sxns   Onset date of current episode/exacerbation 03/09/19   Chronicity Chronic   Pain rating (0-10 point scale) Best (/10);Worst (/10)   Worst (/10) 7   Pain quality A. Sharp;C. Aching  (Sharp pain when going from sitting<>standing, pressure sit)   Frequency of pain/symptoms B. Intermittent   Pain/symptoms exacerbated by A. Sitting   Pain/symptoms eased by C. Rest  (stand/lie down)   Progression of symptoms since onset: Worsened  (Especially with last 2 months)   Prior Level of Function   Prior Level of Function-Mobility Indpendent   Prior Level of Function-ADLs Independent   Functional Level Prior Comment limited with sitting and car travel.   Current Level of Function   Patient role/employment history C. Homemaker   Fall Risk Screen   Fall screen completed by PT   Have you fallen 2 or more times in the past year? No   Have you fallen and had an  injury in the past year? No   Is patient a fall risk? No   Lumbar Spine/SI Objective Findings   Observation slight R rotation in standing, spine straight in flexion. R inominate higher vs. L. Pain with rolling into sidelying.   Posture rounded shoulders   Flexion ROM finger tips to mid-shin; HS tightness   Extension ROM Mildly limited. Minor pain with extension, pain in center   Right Side Bending ROM superior pole of patella   Left Side Bending ROM superior pole of patella   Pelvic Screen R:Thigh thrust(-), L: Thigh thrust (-) ; scaral thrust (-), SI compression (-), gapping (-)   Hip Screen R: Negative FADIR, ARGENIS knee flatScour neg. for hip but produces LBP, hip ER 70 degrees L: LBP with passive hip extension. 45 degrees hip ER, FADIR (-), Scour (-), ARGENIS unable to achieve level   Hip Flexion (L2) Strength 5/5 bilaterally   Hip Abduction Strength R: 4/5; L: 4/5   Knee Flexion Strength 5/5 bilaterally   Knee Extension (L3) Strength 5/5 bilaterally   Ankle Dorsiflexion (L4) Strength 5/5   Great Toe Extension (L5) Strength 5/5   Ankle Plantar Flexion (S1) Strength 5/5   Hamstring Flexibility Mildly tight bilaterally   Piriformis Flexibility Piriformis signifcantly tighter on R side   SLR (-) bilaterally   Spring Test (-)   Segmental Mobility Normal mobility   Palpation Pain in sacral region with sitting, piriformis and glutes tighter on R. L on R sacral torsion.   Planned Therapy Interventions   Planned Therapy Interventions joint mobilization;manual therapy;neuromuscular re-education;ROM;strengthening;stretching   Clinical Impression   Criteria for Skilled Therapeutic Interventions Met yes, treatment indicated   PT Diagnosis SI dysfunction   Influenced by the following impairments pain, decreased ROM, decreased flexibility, decreased strength, Anteriorly rotated L inominate   Functional limitations due to impairments Sitting, traveling, sit<>stand   Clinical Presentation Evolving/Changing   Clinical Presentation  Rationale Chronic with new exacerbation, age, depression   Clinical Decision Making (Complexity) Moderate complexity   Therapy Frequency 1 time/week   Predicted Duration of Therapy Intervention (days/wks) 6 weeks   Risk & Benefits of therapy have been explained Yes   Patient, Family & other staff in agreement with plan of care Yes   Clinical Impression Comments Maddy is coming in 2/2 pain primarily with sitting and transitions in/out of sitting. Maddy would benefit from ongoing skilled PT to correct pelvic malallignment and improve strength.   ORTHO GOALS   PT Ortho Eval Goals 1;2;3   Ortho Goal 1   Goal Identifier 1   Goal Description  Patient can go from Sit<>stand without pain.   Target Date 06/20/19   Ortho Goal 2   Goal Identifier 2   Goal Description Patient can sit for x>30 minutes without pain   Target Date 06/20/19   Ortho Goal 3   Goal Identifier 3   Goal Description Patient will be IND with HEP in order to aid in recovery   Target Date 06/20/19   Total Evaluation Time   PT Brittanyal, Moderate Complexity Minutes (48335) 35   Therapy Certification   Certification date from 05/09/19   Certification date to 06/20/19   Medical Diagnosis Acute midline low back pain without sciatica

## 2019-05-17 ENCOUNTER — HOSPITAL ENCOUNTER (OUTPATIENT)
Dept: PHYSICAL THERAPY | Facility: CLINIC | Age: 34
Setting detail: THERAPIES SERIES
End: 2019-05-17
Attending: NURSE PRACTITIONER
Payer: MEDICAID

## 2019-05-17 PROCEDURE — 97110 THERAPEUTIC EXERCISES: CPT | Mod: GP | Performed by: PHYSICAL THERAPIST

## 2019-05-17 PROCEDURE — 97140 MANUAL THERAPY 1/> REGIONS: CPT | Mod: GP | Performed by: PHYSICAL THERAPIST

## 2019-05-23 ENCOUNTER — HOSPITAL ENCOUNTER (OUTPATIENT)
Dept: PHYSICAL THERAPY | Facility: CLINIC | Age: 34
Setting detail: THERAPIES SERIES
End: 2019-05-23
Attending: NURSE PRACTITIONER
Payer: MEDICAID

## 2019-05-23 PROCEDURE — 97110 THERAPEUTIC EXERCISES: CPT | Mod: GP | Performed by: PHYSICAL THERAPIST

## 2019-05-29 ENCOUNTER — OFFICE VISIT - HEALTHEAST (OUTPATIENT)
Dept: SURGERY | Facility: CLINIC | Age: 34
End: 2019-05-29

## 2019-05-29 ENCOUNTER — TRANSFERRED RECORDS (OUTPATIENT)
Dept: HEALTH INFORMATION MANAGEMENT | Facility: CLINIC | Age: 34
End: 2019-05-29

## 2019-05-29 DIAGNOSIS — E66.09 CLASS 1 OBESITY DUE TO EXCESS CALORIES WITH SERIOUS COMORBIDITY AND BODY MASS INDEX (BMI) OF 34.0 TO 34.9 IN ADULT: ICD-10-CM

## 2019-05-29 DIAGNOSIS — K21.9 GASTROESOPHAGEAL REFLUX DISEASE WITHOUT ESOPHAGITIS: ICD-10-CM

## 2019-05-29 DIAGNOSIS — E66.811 CLASS 1 OBESITY DUE TO EXCESS CALORIES WITH SERIOUS COMORBIDITY AND BODY MASS INDEX (BMI) OF 34.0 TO 34.9 IN ADULT: ICD-10-CM

## 2019-05-29 ASSESSMENT — MIFFLIN-ST. JEOR: SCORE: 1599.21

## 2019-05-30 ENCOUNTER — HOSPITAL ENCOUNTER (OUTPATIENT)
Dept: PHYSICAL THERAPY | Facility: CLINIC | Age: 34
Setting detail: THERAPIES SERIES
End: 2019-05-30
Attending: NURSE PRACTITIONER
Payer: MEDICAID

## 2019-05-30 PROCEDURE — 97110 THERAPEUTIC EXERCISES: CPT | Mod: GP | Performed by: PHYSICAL THERAPIST

## 2019-06-04 ENCOUNTER — OFFICE VISIT - HEALTHEAST (OUTPATIENT)
Dept: SURGERY | Facility: CLINIC | Age: 34
End: 2019-06-04

## 2019-06-04 ENCOUNTER — TRANSFERRED RECORDS (OUTPATIENT)
Dept: HEALTH INFORMATION MANAGEMENT | Facility: CLINIC | Age: 34
End: 2019-06-04

## 2019-06-04 DIAGNOSIS — Z71.3 NUTRITIONAL COUNSELING: ICD-10-CM

## 2019-06-04 DIAGNOSIS — K21.9 GASTROESOPHAGEAL REFLUX DISEASE WITHOUT ESOPHAGITIS: ICD-10-CM

## 2019-06-04 DIAGNOSIS — E66.811 OBESITY, CLASS I, BMI 30.0-34.9 (SEE ACTUAL BMI): ICD-10-CM

## 2019-06-04 ASSESSMENT — MIFFLIN-ST. JEOR: SCORE: 1605.1

## 2019-06-06 ENCOUNTER — COMMUNICATION - HEALTHEAST (OUTPATIENT)
Dept: SURGERY | Facility: CLINIC | Age: 34
End: 2019-06-06

## 2019-06-06 ENCOUNTER — HOSPITAL ENCOUNTER (OUTPATIENT)
Dept: PHYSICAL THERAPY | Facility: CLINIC | Age: 34
Setting detail: THERAPIES SERIES
End: 2019-06-06
Attending: NURSE PRACTITIONER
Payer: MEDICAID

## 2019-06-06 PROCEDURE — 97110 THERAPEUTIC EXERCISES: CPT | Mod: GP | Performed by: PHYSICAL THERAPIST

## 2019-06-07 ENCOUNTER — ALLIED HEALTH/NURSE VISIT (OUTPATIENT)
Dept: ALLERGY | Facility: CLINIC | Age: 34
End: 2019-06-07
Payer: MEDICAID

## 2019-06-07 DIAGNOSIS — J30.1 CHRONIC SEASONAL ALLERGIC RHINITIS DUE TO POLLEN: ICD-10-CM

## 2019-06-07 DIAGNOSIS — J30.89 ALLERGIC RHINITIS DUE TO DUST MITE: ICD-10-CM

## 2019-06-07 DIAGNOSIS — J30.9 ALLERGIC RHINITIS: Primary | ICD-10-CM

## 2019-06-07 DIAGNOSIS — J30.89 ALLERGIC RHINITIS DUE TO MOLD: ICD-10-CM

## 2019-06-07 DIAGNOSIS — J30.81 ALLERGIC RHINITIS DUE TO ANIMAL DANDER: ICD-10-CM

## 2019-06-07 PROCEDURE — 95117 IMMUNOTHERAPY INJECTIONS: CPT

## 2019-06-07 PROCEDURE — 99207 ZZC DROP WITH A PROCEDURE: CPT

## 2019-06-07 NOTE — TELEPHONE ENCOUNTER
ALLERGY SOLUTION RE-ORDER REQUEST    Maddy Ortiz 1985 MRN: [id number]    DATE NEEDED:  06/21/19  Vial Color Content   Top Dose   Last Dose Vial Size  Red 1:1 Cat, Dog, Dust Mite   Red 1:1 0.5   Red 1:10.5 5ml  Red 1:1 Molds   Red 1:1 0.5   Red 1:10.5 5mL  Red 1:1 Trees   Red 1:1 0.5   Red 1:10.5 5mL  Red 1:1 Weeds   Red 1:1 0.5   Red 1:10.5 5mL      Serum reorder consent signed and patient/parent was advised that new serums would be ordered through the pharmacy and billed to their insurance company when they arrive in clinic. Yes    Shot Clinic Location:  [place]  Ship to Location: [place]  Serum billed to:  [place]    Special Instructions:        Updated Prescription Needed: No      Requester Signature  Kenney Saeed reorder consent signed and patient/parent was advised that new serums would be ordered through the pharmacy and billed to their insurance company when they arrive in clinic. Yes

## 2019-06-17 ENCOUNTER — COMMUNICATION - HEALTHEAST (OUTPATIENT)
Dept: SURGERY | Facility: CLINIC | Age: 34
End: 2019-06-17

## 2019-06-18 ENCOUNTER — TELEPHONE (OUTPATIENT)
Dept: ALLERGY | Facility: CLINIC | Age: 34
End: 2019-06-18

## 2019-06-18 DIAGNOSIS — J30.2 SEASONAL ALLERGIC RHINITIS: Primary | ICD-10-CM

## 2019-06-18 PROCEDURE — 95165 ANTIGEN THERAPY SERVICES: CPT | Performed by: ALLERGY & IMMUNOLOGY

## 2019-06-18 NOTE — TELEPHONE ENCOUNTER
Pt vials came with Dr. Blevins's name on them. The vials should read Dr Arizmendi. Spoke with Alexander CINTRON At [facility] and he said he will send new labels to [facility].  Kenney Pena / JACKELYN

## 2019-06-18 NOTE — TELEPHONE ENCOUNTER
Allergy serums received at [place].     Vials received below:    Vial Color Content                      Vial Size Expiration Date  Red 1:1 Molds 5mL  06/17/20  Red 1:1 Cat, Dog, Dust Mite 5mL  06/17/20  Red 1:1 Weeds 5mL  06/17/20  Red 1:1 Trees 5mL  06/17/20      Signature  Kenney Pena

## 2019-06-18 NOTE — PROGRESS NOTES
Allergy serums billed at [place].     Vials received below:    Vial Color Content                      Vial Size Expiration Date  Red 1:1 Molds 5mL  06/17/20  Red 1:1 Cat, Dog, Dust Mite 5mL  06/17/20  Red 1:1 Weeds 5mL  06/17/20  Red 1:1 Trees 5mL  06/17/20    Original Refill encounter date: 06/07/19      Signature  Kenney Pena

## 2019-06-20 ENCOUNTER — HOSPITAL ENCOUNTER (OUTPATIENT)
Dept: PHYSICAL THERAPY | Facility: CLINIC | Age: 34
Setting detail: THERAPIES SERIES
End: 2019-06-20
Attending: NURSE PRACTITIONER
Payer: MEDICAID

## 2019-06-20 PROCEDURE — 97140 MANUAL THERAPY 1/> REGIONS: CPT | Mod: GP | Performed by: PHYSICAL THERAPIST

## 2019-06-20 NOTE — PROGRESS NOTES
Physical Therapy Progress Note    Physical Therapy Progress Note and Medicare RECERTIFICATION    Maddy Ortiz  1985    Session Number: 6 Medicaid since start of care.    Reasons for Continuing Treatment:   Pt has not had direct coccyx pain addressed yet and may benefit from mobs and taping.    Frequency/Duration  1 times per week for 4 weeks for a total of 4 visits.    Recertification Period  5/9/19 - 7/20/19    Physician Signature:    Date:    X_______________________________________________________    Physician Name: Shirley Freeman NP    I certify the need for these services furnished under this plan of treatment and while under my care. Physician co-signature of this document indicates review and certification of the therapy plan.  This signature may be written on paper, or electronically signed within EPIC.                06/20/19 1400   Signing Clinician's Name / Credentials   Signing clinician's name / credentials Renee Sutton, PT MA #[id number]   Session Number   Session Number 6 Medicaid   Progress Note/Recertification   Progress Note Due Date 06/20/19   Progress Note Completed Date 06/20/19   Ortho Goal 1   Goal Identifier 1   Goal Description  Patient can go from Sit<>stand without pain.   Target Date 06/20/19   Date Met 06/07/19   Ortho Goal 2   Goal Identifier 2   Goal Description Patient can sit for x>30 minutes without pain.  Not Met: pain after 10 mins sitting.   (Cont for 2 weeks. )   Target Date 07/04/19   Ortho Goal 3   Goal Identifier 3   Goal Description Patient will be IND with HEP in order to aid in recovery  (Ongoing goal, updated each session; cont for 4 weeks. )   Target Date 07/18/19   Subjective Report   Subjective Report Pain in tailbone with sitting.  Can sit for about 10 mins and then has to shift positions or get up.    Objective Measure 1   Objective Measure Pain   Details 3/10 with sitting.    Objective Measure 2   Objective Measure Pelvic Alignement   Details WNL in  sagittal plane; Transverse torsion detected with (R) ASIS prominent vs (L).   Objective Measure 3   Objective Measure Palpation   Details Tender (L) > (R) sacrospinous and sacrotuberous ligs.  Not tender directly over coccyx.    Therapeutic Procedure/exercise   Therapeutic Procedures: strength, endurance, ROM, flexibillity minutes (43314) 5   Skilled Intervention Exer; stretching, progression of HEP   Patient Response Pt able to demo exers correctly, but states hasn't been doing as often as suggested.  Likes to do pool exers best.    Treatment Detail Reviewed piriformis stretch and deep squats.  Educated on why these exers may help her pain.  Told could try deep squats in shallow end of pool.    Manual Therapy   Manual Therapy: Mobilization, MFR, MLD, friction massage minutes (32859) 25   Skilled Intervention MT: METS, STM for ligamentous stretching   Patient Response Less tender (L) sacrospinous after rx.    Treatment Detail Supine for shotgun technique to correct for transverse torsion.  Symph Pub METS to set the corrected position.  Prone for sacrotuberous release (B).  STM and manual stretch with cross fiber STM over sacrospinous (L).  Pudendal nerve glides (L) to release pain and tension.  Taped for coccyx posterior draw to relieve tension and pain.  Done in sitting with Strapping Tape.    Plan   Home program Cont with current stretching and pool exers.    Plan for next session See pt in one week. Cont with taping.  Try coccyx mobs.  Cont to advance HEP.  See pt for up to 4 more sessions.    Total Session Time   Timed Code Treatment Minutes 30 (2MT)   Total Treatment Time (sum of timed and untimed services) 30 (2MT)   Thank you for the referral of this patient.  Renee Sutton, PT, MA  #[id number]

## 2019-06-26 ENCOUNTER — HOSPITAL ENCOUNTER (OUTPATIENT)
Dept: PHYSICAL THERAPY | Facility: CLINIC | Age: 34
Setting detail: THERAPIES SERIES
End: 2019-06-26
Attending: NURSE PRACTITIONER
Payer: MEDICAID

## 2019-06-26 PROCEDURE — 97140 MANUAL THERAPY 1/> REGIONS: CPT | Mod: GP | Performed by: PHYSICAL THERAPIST

## 2019-06-26 PROCEDURE — 97110 THERAPEUTIC EXERCISES: CPT | Mod: GP | Performed by: PHYSICAL THERAPIST

## 2019-07-02 ENCOUNTER — HOSPITAL ENCOUNTER (OUTPATIENT)
Dept: PHYSICAL THERAPY | Facility: CLINIC | Age: 34
Setting detail: THERAPIES SERIES
End: 2019-07-02
Attending: NURSE PRACTITIONER
Payer: MEDICAID

## 2019-07-02 PROCEDURE — 97140 MANUAL THERAPY 1/> REGIONS: CPT | Mod: GP | Performed by: PHYSICAL THERAPIST

## 2019-07-02 PROCEDURE — 97110 THERAPEUTIC EXERCISES: CPT | Mod: GP | Performed by: PHYSICAL THERAPIST

## 2019-07-05 ENCOUNTER — ALLIED HEALTH/NURSE VISIT (OUTPATIENT)
Dept: ALLERGY | Facility: CLINIC | Age: 34
End: 2019-07-05
Payer: MEDICAID

## 2019-07-05 DIAGNOSIS — Z51.6 NEED FOR DESENSITIZATION TO ALLERGENS: Primary | ICD-10-CM

## 2019-07-05 PROCEDURE — 95117 IMMUNOTHERAPY INJECTIONS: CPT

## 2019-07-05 PROCEDURE — 99207 ZZC DROP WITH A PROCEDURE: CPT

## 2019-07-11 ENCOUNTER — TRANSFERRED RECORDS (OUTPATIENT)
Dept: HEALTH INFORMATION MANAGEMENT | Facility: CLINIC | Age: 34
End: 2019-07-11

## 2019-07-11 ENCOUNTER — OFFICE VISIT - HEALTHEAST (OUTPATIENT)
Dept: SURGERY | Facility: CLINIC | Age: 34
End: 2019-07-11

## 2019-07-11 DIAGNOSIS — E66.811 OBESITY, CLASS I, BMI 30.0-34.9 (SEE ACTUAL BMI): ICD-10-CM

## 2019-07-11 DIAGNOSIS — Z71.3 NUTRITIONAL COUNSELING: ICD-10-CM

## 2019-07-11 ASSESSMENT — MIFFLIN-ST. JEOR: SCORE: 1590.13

## 2019-07-12 ENCOUNTER — HOSPITAL ENCOUNTER (OUTPATIENT)
Dept: PHYSICAL THERAPY | Facility: CLINIC | Age: 34
Setting detail: THERAPIES SERIES
End: 2019-07-12
Attending: NURSE PRACTITIONER
Payer: MEDICAID

## 2019-07-12 PROCEDURE — 97110 THERAPEUTIC EXERCISES: CPT | Mod: GP | Performed by: PHYSICAL THERAPIST

## 2019-07-12 PROCEDURE — 97140 MANUAL THERAPY 1/> REGIONS: CPT | Mod: GP | Performed by: PHYSICAL THERAPIST

## 2019-07-16 ENCOUNTER — PATIENT OUTREACH (OUTPATIENT)
Dept: CARE COORDINATION | Facility: CLINIC | Age: 34
End: 2019-07-16

## 2019-07-16 ASSESSMENT — ACTIVITIES OF DAILY LIVING (ADL): DEPENDENT_IADLS:: INDEPENDENT

## 2019-07-16 NOTE — LETTER
[facility]  Complex Care Plan  About Me:    Patient Name:  Maddy Ortiz    YOB: 1985  Age:         33 year old   Tigre MRN:    [id number] Telephone Information:  Home Phone [phone]   Mobile [phone]       Address:  [address]  [facility] [address] Email address:  No e-mail address on record      Emergency Contact(s)    Name Relationship Lgl Grd Work Phone Home Phone Mobile Phone   1. RAMY ORTIZ Mother No  [phone]    2. KASHIF ORTIZ Father No  [phone]    3. KASHIF DIAZ Friend No none [phone]           Primary language:  English    Other communication barriers: None  Preferred Method of Communication:  Mail  Current living arrangement: I live in a private home with my daughter.  Mobility Status/ Medical Equipment: Independent    Health Maintenance:  Health Maintenance Reviewed: Due/Overdue:  Health Maintenance Due   Topic Date Due     PREVENTIVE CARE VISIT  09/21/2013     My Access Plan  Medical Emergency 911   Primary Clinic Line [facility] - [phone]   24 Hour Appointment Line [phone] or  [phone]) (toll-free)   24 Hour Nurse Line [phone] (toll-free)   Preferred Urgent Care [facility], [phone]   Preferred Hospital [facility]  [phone]   Preferred Pharmacy [facility]     Behavioral Health Crisis Line The National Suicide Prevention Lifeline at 1-742.163.5271 or 911     My Care Team Members  Patient Care Team       Relationship Specialty Notifications Start End    Shirley Freeman APRN CNP PCP - General Nurse Practitioner  5/24/18     Phone: [phone] Fax: [phone] [address] [facility] [address]    Christie CLEVELAND worker   11/21/13     [facility]    Phone: [phone] [address]  [place] [address].      Eileen Steinerleonoron   Licensed Mental Health  11/21/13     [facility]      Phone: [phone]         [facility] Occupational Therapist Licensed Mental Health  7/16/17     Mental Health OT sessions; attends sessions 2x/week    Phone: [phone]         [facility] [facility], [address], [place] [address]                            Aggie Lehman MD MD INTERNAL MEDICINE - ENDOCRINOLOGY, DIABETES & METABOLISM  5/4/18     Phone: [phone] Fax: [phone]         [address] [place] [address] [address] [facility] [address]    Alexander Montemayor MD MD Psychiatry  7/16/18     [facility] Location:  [phone]    Phone: [phone] Fax: [phone]         [facility] [address] [place] [address]    Sridevi Lincoln LSW Clinic Care Coordinator Primary Care - CC Admissions 7/16/19     Phone: [phone] Fax: [phone]                My Care Plans  Self Management and Treatment Plan, Goals and (Comments)  Goals        General    Mental Health Management (pt-stated)     Notes - Note created  7/16/2019 12:14 PM by Sridevi Lincoln LSW    Goal Statement: I want to attend another mental health day program beginning in September, 2019  Measure of Success: Pt will enroll & engage in a day program, if eligible for services.  Supportive Steps to Achieve: Pt, care team & this writer to find appropriate program to meet the pt's needs.  Barriers: N/A  Strengths: Pt appears motivated to engage with another day program once her daughter returns to school  Date to Achieve By: September 30, 2019  Patient expressed understanding of goal: Yes                 Action Plans on File:  Depression      Advance Care Plans/Directives Type: No.       My Medical and Care Information  Problem List   Patient Active Problem List   Diagnosis     Animal dander allergy     CARDIOVASCULAR SCREENING; LDL GOAL LESS THAN 160      Psychosis (H)     Paranoid type delusional disorder (H)     Bipolar 1 disorder (H)     Insomnia     Depression with anxiety     Seasonal allergic rhinitis due to pollen     Allergic rhinitis due to mold     Allergic rhinitis due to animal dander     Allergic rhinitis due to dust mite     Encounter for IUD insertion     Schizoaffective disorder, bipolar type (H)     Gastroesophageal reflux disease without esophagitis     Paranoia (psychosis) (H)     Obesity (BMI 35.0-39.9) with comorbidity (H)      Current Medications and Allergies:  See printed Medication Report.    Care Coordination Start Date: 7/16/2019   Frequency of Care Coordination: 3 weeks   Form Last Updated: 07/16/2019

## 2019-07-16 NOTE — LETTER
Health Care Home - Access Care Plan  About Me:    Patient Name:  Maddy Ortiz    YOB: 1985  Age:                      33 year old   Ecru MRN:     [id number] Telephone Information:   Home Phone [phone]   Mobile [phone]       Address:  [address] [facility] [address]  [facility] [address] Email address:  No e-mail address on record      Emergency Contact(s)   Name Relationship Lgl Grd Work Phone Home Phone Mobile Phone   1. RAMY ORTIZ Mother No  [phone]    2. KASHIF ORTIZ Father No  [phone]    3. KASHIF DIAZ Friend No none [phone]             Health Maintenance: Routine Health maintenance Reviewed: Due/Overdue ***    My Access Plan  Medical Emergency 911   Questions or concerns during clinic hours Primary Clinic Line, I will call the clinic directly: [facility] - [phone]   24 Hour Appointment Line [phone] or  [phone]) (toll free)   24 Hour Nurse Line [phone] (toll free)   Questions or concerns outside clinic hours 24 Hour Appointment Line, I will call the after-hours on-call line:   {Clinics - Healthcare Home:182152}   Preferred Urgent Care [facility], [phone]   Preferred Hospital [facility]  [phone]   Preferred Pharmacy [address]     Behavioral Health Crisis Line The National Suicide Prevention Lifeline at 1-137.859.9456 or 911                     My Care Team Members  Patient Care Team       Relationship Specialty Notifications Start End    Shirley Freeman APRN CNP PCP - General Nurse Practitioner  5/24/18     Phone: [phone] Fax: [phone] [address] [facility] [address]    Cayla Winchester Psychologist Psychology  11/21/13     [facility]- TIMOTHY signed 11/20/13-   Email- [email or web address]    Phone: [phone] Fax:  [phone]         [facility] [address] [facility] [address]    Callie Welsh ARM worker   11/21/13     Temi PONCE    11/21/13     [facility]  (TIMOTHY signed 11/20/13)    Phone: [phone]         Wallace Calabrese MD MD Psychiatry  11/21/13     [facility] [place] (TIMOTHY signed 11/20/13)    Phone: [phone] Fax: [phone]         [facility] [address] [facility] [address]    Aggie Lehman MD MD INTERNAL MEDICINE - ENDOCRINOLOGY, DIABETES & METABOLISM  5/4/18     Phone: [phone] Fax: [phone]         [address] [address]    Shirley Freeman APRN CNP Assigned PCP   6/17/18     Phone: [phone] Fax: [phone] [address] [facility] [address]    Sridevi Lincoln LSW Clinic Care Coordinator Primary Care - CC Admissions 7/16/19     Phone: [phone] Fax: [phone]               My Medical and Care Information  Problem List   Patient Active Problem List   Diagnosis     Animal dander allergy     CARDIOVASCULAR SCREENING; LDL GOAL LESS THAN 160     Psychosis (H)     Paranoid type delusional disorder (H)     Bipolar 1 disorder (H)     Insomnia     Depression with anxiety     Seasonal allergic rhinitis due to pollen     Allergic rhinitis due to mold     Allergic rhinitis due to animal dander     Allergic rhinitis due to dust mite     Encounter for IUD insertion     Schizoaffective disorder, bipolar type (H)     Gastroesophageal reflux disease without esophagitis     Paranoia (psychosis) (H)     Obesity (BMI 35.0-39.9) with comorbidity (H)      Current Medications and Allergies:  See printed Medication Report

## 2019-07-16 NOTE — PROGRESS NOTES
Clinic Care Coordination Contact    Clinic Care Coordination Contact  OUTREACH    Referral Information:  Referral Source: Self-patient/Caregiver  Primary Diagnosis: Behavioral Health    Pt called previous CC, Rosemary, and left a voice message today. SW reviewed pt's EMR and returned call, introducing self, title and role.  SW explained that Rosemary has retired, but that I work at the [facility] and would like to partner with her to assist with her needs.  Pt was agreeable to our conversation and allowed this writer to complete an initial assessment, as she is a new patient to me.     Pt shared she is looking for guidance as to what outpatient mental health program she should engage with once her daughter, Leonora age 10, returns to school.  Pt shared she does not have day care assistance, but Leonora has been to camps this summer & returns to school Sept. 4, 2019.    Pt shared that she currently is engaged with the following providers for services: (please also see care team tab)    [facility], Psychiatrist, Dr. Montemayor at  [phone]:  Pt shared one of her family members is not keen on his work with the pt, so she has made an appointment to get a second opinion from her previous Psychiatrist, Dr. Rodriguez of [facility] in [place] - [phone].  Appointment is for the end of this month.    [facility] is her [facility] provider:   Address: [address], [place] [address].  [phone]. Christie comes every Tuesday.     [facility] , Eileen Vera, [phone]    [facility]:  [facility] [facility], [address], [place] [address].  [phone]. Per the website: [facility] is a Medicare certified outpatient rehabilitation agency that specializes in providing individualized occupational therapy to those who struggle with their mental health.  Pt goes to programming 2/x week    She  completed intensive treatment at "[facility] and is not sure if she wants to return to programming there as she became paranoid of the people in her group.  Pt has also been to [facility] outpatient program & is somewhat ambivalent returning to program there.    Chief Complaint   Patient presents with     Clinic Care Coordination - Initial     social work    Universal Utilization: Clinic Utilization  Difficulty keeping appointments:: Yes  Compliance Concerns: Yes  No-Show Concerns: Yes  No PCP office visit in Past Year: No  Utilization    Last refreshed: 7/15/2019  7:56 PM:  Hospital Admissions 0           Last refreshed: 7/15/2019  7:56 PM:  ED Visits 3           Last refreshed: 7/15/2019  7:56 PM:  No Show Count (past year) 9              Current as of: 7/15/2019  7:56 PM            Clinical Concerns:  Current Medical Concerns:  Pt lives with allergies & mental health issues    Current Behavioral Concerns: bipolar 1, depression w/anxiety, paranoid type delusional disorder, and schizoaffective disorder   Education Provided to patient: See above     Pain  Pain (GOAL):: No    Health Maintenance Reviewed: Due/Overdue:  Health Maintenance Due   Topic Date Due     PREVENTIVE CARE VISIT  09/21/2013     Clinical Pathway: Clinic Care Coordinator - Depression Symptoms  Symptoms reported by patient at the time of admission or diagnosis included: depressed mood, insomnia, and feelings of hopelessness  Reviewed patient's current symptoms: depressed mood but manageable.    Medication Management:  Pt takes medication on own & is able to afford them via her MA     Functional Status:  Dependent ADLs:: Independent  Dependent IADLs:: Independent  Bed or wheelchair confined:: No  Mobility Status: Independent  Fallen 2 or more times in the past year?: No  Any fall with injury in the past year?: No    Living Situation:  Current living arrangement:: I live in a private home with my daughter, Leonora, age 10 (10 in  August)  Type of residence:: Apartment; low income pays 30% of income    Diet/Exercise/Sleep:  Diet:: Regular  Inadequate nutrition (GOAL):: No  Food Insecurity: No  Tube Feeding: No  Exercise:: Currently not exercising  Inadequate activity/exercise (GOAL):: No  Significant changes in sleep pattern (GOAL): No    Transportation:  Transportation concerns (GOAL):: No  Transportation means:: Medical transport via [facility]     Psychosocial:  Episcopalian or spiritual beliefs that impact treatment:: No  Mental health DX:: Yes  Mental health DX how managed:: Medication, Psychiatrist, Outpatient Counseling  Mental health management concern (GOAL):: Yes  Informal Support system:: Parent, Children     Financial/Insurance: SSDI of $754, Child Support of $155, and $120 in SNAP for food.  Financial/Insurance concerns (GOAL):: No  Pt manages her own finances.     Resources and Interventions:  Current Resources: Please see care team listed above.  Pt also has very supportive parents.   Community Resources: [facility] Programs, [facility] Worker, [facility],  Mental Health, School, , Transportation Services, Volunteer(SNAP for food support & child support )  Supplies used at home:: None  Equipment Currently Used at Home: none    Advance Care Plan/Directive  Advanced Care Plans/Directives on file:: No  Advanced Care Plan/Directive Status: Considering Options.  Will discuss at a later date.    Referrals Placed: Behavioral Health Providers, Mental Health     Goals:   Goals        General    Mental Health Management (pt-stated)     Notes - Note created  7/16/2019 12:14 PM by Sridevi Lincoln LSW    Goal Statement: I want to attend another mental health day program beginning in September, 2019  Measure of Success: Pt will enroll & engage in a day program, if eligible for services.  Supportive Steps to Achieve: Pt, care team & this writer to find appropriate program to meet the pt's needs.  Barriers: N/A  Strengths: Pt appears motivated  to engage with another day program once her daughter returns to school  Date to Achieve By: September 30, 2019  Patient expressed understanding of goal: Yes              Patient/Caregiver understanding: Pt is aware that this writer is wishing to partner with her for mental health stability & navigation of her medical cares.    Outreach Frequency: 3 weeks; PRN    Future Appointments              In 3 days Renee Sutton, E[facility] [place] [facility]    In 1 week Renee Sutton PT [facility]        Plan: Pt wishes to attend the Psych appointment with Dr. Patrick to determine what type of day program would best benefit her.  SW and pt to speak again after that appointment.  Pt is also aware she can contact this writer during business hours, as needed.    Sridevi Anderson  Social Work Care Coordinator  [facility]  [phone]

## 2019-07-19 ENCOUNTER — HOSPITAL ENCOUNTER (OUTPATIENT)
Dept: PHYSICAL THERAPY | Facility: CLINIC | Age: 34
Setting detail: THERAPIES SERIES
End: 2019-07-19
Attending: NURSE PRACTITIONER
Payer: MEDICAID

## 2019-07-19 PROCEDURE — 97140 MANUAL THERAPY 1/> REGIONS: CPT | Mod: GP | Performed by: PHYSICAL THERAPIST

## 2019-07-19 PROCEDURE — 97110 THERAPEUTIC EXERCISES: CPT | Mod: GP | Performed by: PHYSICAL THERAPIST

## 2019-07-19 NOTE — PROGRESS NOTES
"Physical Therapy Progress Note and Medicare RECERTIFICATION    Maddy Ortiz  1985    Session Number: 10 Medicaid since start of care.    Reasons for Continuing Treatment:   Pt not yet painfree with sit to stand motion, but is \"better.\"   Continues to have pelvic alignment and lumbar segment mobility issues. Pt could benefit from further MT for alignment and mobility and taping for coccyx pain relief, as she states this is helping to relieve pain with sitting.       Frequency/Duration  1 times per every 2 weeks for 6 weeks for a total of 3 visits.    Recertification Period  7/19/19 - 8/30/19    Physician Signature:    Date:    X_______________________________________________________    Physician Name: Shirley Freeman CNP    I certify the need for these services furnished under this plan of treatment and while under my care. Physician co-signature of this document indicates review and certification of the therapy plan.  This signature may be written on paper, or electronically signed within EPIC.          07/19/19 1100   Signing Clinician's Name / Credentials   Signing clinician's name / credentials Renee Sutton, PT MA #[id number]   Session Number   Session Number 10 Medicaid   Progress Note/Recertification   Progress Note Due Date 08/30/19   Progress Note Completed Date 07/19/19   Recertification Due Date 08/30/19   Ortho Goal 1   Goal Description  Patient can go from Sit<>stand without pain. Not Met: sit to stand is better, but not all gone.   (cont for 6 weeks)   Target Date 08/30/19   Goal Identifier 1   Ortho Goal 2   Goal Description Patient can sit for x>30 minutes without pain.  Met: was able to sit for 30 mins and then pain began in waiting room.   (Cont for 2 weeks. )   Target Date 07/04/19   Goal Identifier 2   Date Met 07/19/19   Ortho Goal 3   Goal Description Patient will be IND with HEP in order to aid in recovery  (Ongoing goal, updated each session; cont for 3 weeks. )   Target Date 08/30/19 " "  Goal Identifier 3   Subjective Report   Subjective Report Pt states noticing less pain, but still has with sit to stand.  Feels best lying or standing/walking.  Not as much c/o fatigue today, but still yawning often during session.    Objective Measure 1   Objective Measure Pelvic Alignment   Details (L) ilium posteriorly rotated   Objective Measure 2   Objective Measure Segmental mobility   Details PA pressures in prone at lumbar facets (B)'ly are tender and not springing in L5-4 and in L2-1.   Objective Measure 3   Objective Measure Palpation   Details Tenderness at (L) > (R) sacrospinous lig, but \"less than before, I think.\"  No tenderness over coccyx. Tender over (L) sacral base.    Therapeutic Procedure/exercise   Therapeutic Procedures: strength, endurance, ROM, flexibillity minutes (22349) 15   Skilled Intervention Exer: stretching, strengthening; progression of HEP   Patient Response Pt able to demo all exer correctly and without increasing pain.    Treatment Detail Reviewed piriformis stretching and deep squats for PFM relaxation.  Instructed in seated trunk rotation as pt was c/o'ing \"feels like I need to stretch something.\"  Instructed in prone arm/leg lifts over 2 pillows.    Manual Therapy   Manual Therapy: Mobilization, MFR, MLD, friction massage minutes (25356) 30   Skilled Intervention MT: METS, mobs, taping   Patient Response Neutral pelvic position after METs.    Treatment Detail Supine METS to correct (L) posterior ilium rotation. Symph Pub METS to set the corrected position. Prone for sacrotuberous release (R), cross-fiber friction massage to sacrospinous ligs (B) for desenistization and stretching. STM and TrP release to (B) glute med and pririformis mms.  UPAs and  to full lumbar spine with focus (R) L2-1 first and then working back down to full L-spine as L5-4 (B) were painful when started UPAs in this region.  Taped for coccyx posterior draw to relieve tension and pain.  Done in sitting " with Strapping Tape.    Plan   Home program Given sheet on trunk rotation and prone arm/leg lifts.    Plan for next session See pt in 2 weeks. Cont to progress to indep in HEP.  See pt for up to 3 more sessions to finalize HEP and cont with taping if needed.    Total Session Time   Timed Code Treatment Minutes 45 (2MT,TE)   Total Treatment Time (sum of timed and untimed services) 45 (2MT,TE)   Thank you for the referral of this patient.  Renee Sutton, PT, MA  #[id number]

## 2019-07-20 ENCOUNTER — COMMUNICATION - HEALTHEAST (OUTPATIENT)
Dept: SURGERY | Facility: CLINIC | Age: 34
End: 2019-07-20

## 2019-07-25 ENCOUNTER — PATIENT OUTREACH (OUTPATIENT)
Dept: CARE COORDINATION | Facility: CLINIC | Age: 34
End: 2019-07-25

## 2019-07-25 DIAGNOSIS — F22 PARANOID TYPE DELUSIONAL DISORDER (H): ICD-10-CM

## 2019-07-25 DIAGNOSIS — F25.0 SCHIZOAFFECTIVE DISORDER, BIPOLAR TYPE (H): Primary | ICD-10-CM

## 2019-07-25 DIAGNOSIS — F41.8 DEPRESSION WITH ANXIETY: ICD-10-CM

## 2019-07-25 DIAGNOSIS — F31.9 BIPOLAR 1 DISORDER (H): ICD-10-CM

## 2019-07-25 ASSESSMENT — ACTIVITIES OF DAILY LIVING (ADL): DEPENDENT_IADLS:: INDEPENDENT

## 2019-07-25 NOTE — PROGRESS NOTES
Clinic Care Coordination Contact    Follow Up Progress Note      Assessment: Pt called SW asking to attend a Mental Health Day treatment program in September 2019.  SW and pt discussed that we had this conversation on 7-16-19, and it was this writer's understanding that pt wanted to attend a Psych appointment with Dr. Patrick on 7-30-19 before action was taken.  SW paraphrased our initial encounter chart note.  Pt agrees with this plan of care, but shared she wants this writer to be aware of programs she has attended in the past:  1.  [facility] [place] [facility]  2.  [facility]  3.  [facility] in [place]    SW asked pt what areas of concern she would like to address while at day treatment and pt shared she hopes to address her paranoia, lack of ability to problem solve, and her social awkwardness.    Pt & SW discussed that her Psych appointment on 7-30-19 may provide good information for which program will be a good fit for her.  Pt agreed with with this plan of care.  SW did request VERBAL PERMISSION to contact Eileen Norton, [facility].  Pt agreed and provided contact phone number.    SW placed call to Eileen & we discussed that Eileen has worked with the pt for just over a year, that the pt is very self sufficient and may perseverate on ideas/topics.      Eileen strongly [facility], as she has been there before & received benefit from it.  Pt's therapist also works for [employer].    Goals addressed this encounter:   Goals Addressed                 This Visit's Progress       Patient Stated      Mental Health Management (pt-stated)   On track     Goal Statement: I want to attend another mental health day program beginning in September, 2019  Measure of Success: Pt will enroll & engage in a day program, if eligible for services.  Supportive Steps to Achieve: Pt, care team & this writer to find  appropriate program to meet the pt's needs.  Barriers: N/A  Strengths: Pt appears motivated to engage with another day program once her daughter returns to school  Date to Achieve By: September 30, 2019  Patient expressed understanding of goal: Yes               Intervention/Education provided during outreach: Discussed topics that pt wishes to address during her next mental health day program.     Outreach Frequency: 2 weeks    Plan:   Pt & Eileen to attend pt's Psych appointment with Dr. Patrick on 7-30-19.  Pt, Eileen & SW to continue to discuss day treatment programs that may be beneficial for the pt.    Care Coordinator will follow up in 2 weeks or less.    Sridevi Anderson  Social Work Care Coordinator  [facility]  [phone]

## 2019-07-26 ENCOUNTER — HOSPITAL ENCOUNTER (OUTPATIENT)
Dept: PHYSICAL THERAPY | Facility: CLINIC | Age: 34
Setting detail: THERAPIES SERIES
End: 2019-07-26
Attending: NURSE PRACTITIONER
Payer: MEDICAID

## 2019-07-26 PROCEDURE — 97140 MANUAL THERAPY 1/> REGIONS: CPT | Mod: GP | Performed by: PHYSICAL THERAPIST

## 2019-07-26 PROCEDURE — 97110 THERAPEUTIC EXERCISES: CPT | Mod: GP | Performed by: PHYSICAL THERAPIST

## 2019-07-29 ENCOUNTER — ALLIED HEALTH/NURSE VISIT (OUTPATIENT)
Dept: ALLERGY | Facility: CLINIC | Age: 34
End: 2019-07-29
Payer: MEDICAID

## 2019-07-29 ENCOUNTER — PATIENT OUTREACH (OUTPATIENT)
Dept: CARE COORDINATION | Facility: CLINIC | Age: 34
End: 2019-07-29

## 2019-07-29 DIAGNOSIS — Z51.6 NEED FOR DESENSITIZATION TO ALLERGENS: Primary | ICD-10-CM

## 2019-07-29 PROCEDURE — 99207 ZZC DROP WITH A PROCEDURE: CPT

## 2019-07-29 PROCEDURE — 95115 IMMUNOTHERAPY ONE INJECTION: CPT

## 2019-07-29 NOTE — PROGRESS NOTES
"Clinic Care Coordination Contact    Follow Up Progress Note      Assessment: Pt called this writer today and began by stating, \"I think I need to go to the hospital, can you help me with day care for my daughter?\"    After conversation about what the pt is experiencing, the pt determined that she does not need to come to the hospital, rather, she will attend a therapist appointment at [facility] today at 2PM with murphy Laureano, and she has a Psych provider appointment at Dr. Cool's office tomorrow.      When this writer asked pt if she is safe, pt stated her [employer] worker, Christie, is with her right now.  SW asked the pt if she has thoughts of self harm or harming others & pt declined, stating she believes she is having situational depression with symptoms including sleeping in bed too much this weekend.  Pt shared her mom is aware & helping with her daughter.    SW and pt again discussed mental health day treatment programs & this writer explained that both her [facility]  & I support the idea of her returning to [facility] for programming.  SW explained that as pt currently has a therapist, Sridevi,  via [facility] and she is aware of the program, pt may feel most comfortable there.  Pt agrees with this plan of care and said she will  Speak with murphy Laureano today to be the process going.    Goals addressed this encounter:   Goals Addressed                 This Visit's Progress       Patient Stated      Mental Health Management (pt-stated)   On track     Goal Statement: I want to attend another mental health day program beginning in September, 2019  Measure of Success: Pt will enroll & engage in a day program, if eligible for services.  Supportive Steps to Achieve: Pt, care team & this writer to find appropriate program to meet the pt's needs.  Barriers: N/A  Strengths: Pt appears motivated to engage with another day program once her daughter returns to school  Date to Achieve By: " September 30, 2019  Patient expressed understanding of goal: Yes              Intervention/Education provided during outreach:   1.  SW assessed pt for safety.  2.  SW and pt discussed mental health options for today.  3.  SW and pt discussed that pt will speak with her therapist today to begin the intake process for day treatment.     Outreach Frequency: 2 weeks    Plan:   SW to continue to follow and will speak with pt in 1-2 weeks.    Sridevi Anderson  Social Work Care Coordinator  [facility]  [phone]

## 2019-07-31 ENCOUNTER — PATIENT OUTREACH (OUTPATIENT)
Dept: CARE COORDINATION | Facility: CLINIC | Age: 34
End: 2019-07-31

## 2019-07-31 ASSESSMENT — ACTIVITIES OF DAILY LIVING (ADL): DEPENDENT_IADLS:: INDEPENDENT

## 2019-07-31 NOTE — PROGRESS NOTES
"Addendum:  SW received phone call from Eileen, Mental Health , who shared that she just spoke with the pt a few minutes ago.    [facility] at [facility].    Eileen also shared that the pt's appointment with Dr. Rodriguez yesterday was not what the pt was expecting, as Dr. Rodriguez listened to the pt and then directly told her that she is being manipulative with her mental health dx.  According to Eileen & Dr. Rodriguez's assessment, the pt showed no signs of depression during the appointment, showed no signs of crisis, and expected him to change medications based off 1 appointment, especially when the pt has not been to him in over a year.  Dr. Rodriguez challenged the pt to stop focusing on her mental health & to engage in the community more.  The pt requested to be hospitalized and Dr. Rodriguez declined, stating there was no critical need for hospitalization.      Eileen also said that she will check on the pt on 8.2-19    Sridevi Anderson, Clinic Care Coordination-Social Work  [phone]    Clinic Care Coordination Contact    Follow Up Progress Note      Assessment: Pt called this writer and said that she needs to find a new Psych provider.  SW inquired why, as the pt just had an appointment with Dr. Rodriguez at [facility] just yesterday.  Pt shared that she is upset with Dr. Rodriguez as he did not remember her the way she wished & he would not adjust her medications as he did not have her current records from [facility].    Pt stated, \"I am suffering.  I should be in a hospital getting my medications adjusted. I need to stay there until my medications are ok.  I am in pain & suffering.\"      SW was able to determine that pt has no thoughts of self harm or thoughts of harming others.      SW recommended that pt call [facility] and request a same day or urgent appointment, as SW educated that even if we did find a new Psych provider for the pt, it is most likely " that no one will adjust medications until they also receive her records.  Pt agreed with this plan and said she would call [facility] & I said I would call her MH Case Manger, Eileen.    SW placed call and left a message for Eileen to return call to either this writer or the pt this afternoon.    SW called the pt back.  She said she left a message for [facility] to call her.  SW asked the pt if she felt she needed to see her Therapist or a Crisis Worker & pt declined, stating she just saw her therapist yesterday & that she just wants a new medication.    SW explained that Psych providers who know her will most likely be the only provider who will adjust her medications at this time.  Again, pt states she is not having feelings of self harm, no thoughts of harming her daughter or others at this time.    Pt is aware that if she needs to, she can come to the ED for evaluation, but she declines.      Pt got another incoming call and asked if she could call me back later.      Goals addressed this encounter:   Goals Addressed                 This Visit's Progress       Patient Stated      Mental Health Management (pt-stated)   On track     Goal Statement: I want to attend another mental health day program beginning in September, 2019  Measure of Success: Pt will enroll & engage in a day program, if eligible for services.  Supportive Steps to Achieve: Pt, care team & this writer to find appropriate program to meet the pt's needs.  Barriers: N/A  Strengths: Pt appears motivated to engage with another day program once her daughter returns to school  Date to Achieve By: September 30, 2019  Patient expressed understanding of goal: Yes              Intervention/Education provided during outreach: See above     Outreach Frequency: 2 weeks    Plan:   Pt to contact [facility] and request urgent clinic appointment.    Pt to call this writer back if she needs anything.    Care Coordinator will follow up in 2 weeks or sooner.      Sridevi Jiménez [facility]  Primary Care Clinic- Social Work Care Coordinator  [facility]  [phone]

## 2019-08-06 ENCOUNTER — ALLIED HEALTH/NURSE VISIT (OUTPATIENT)
Dept: ALLERGY | Facility: CLINIC | Age: 34
End: 2019-08-06
Payer: MEDICAID

## 2019-08-06 DIAGNOSIS — J30.9 ALLERGIC RHINITIS: ICD-10-CM

## 2019-08-06 DIAGNOSIS — Z51.6 NEED FOR DESENSITIZATION TO ALLERGENS: Primary | ICD-10-CM

## 2019-08-06 PROCEDURE — 99207 ZZC DROP WITH A PROCEDURE: CPT

## 2019-08-06 PROCEDURE — 95117 IMMUNOTHERAPY INJECTIONS: CPT

## 2019-08-08 ENCOUNTER — HOSPITAL ENCOUNTER (OUTPATIENT)
Dept: PHYSICAL THERAPY | Facility: CLINIC | Age: 34
Setting detail: THERAPIES SERIES
End: 2019-08-08
Attending: NURSE PRACTITIONER
Payer: MEDICAID

## 2019-08-08 PROCEDURE — 97110 THERAPEUTIC EXERCISES: CPT | Mod: GP | Performed by: PHYSICAL THERAPIST

## 2019-08-08 PROCEDURE — 97140 MANUAL THERAPY 1/> REGIONS: CPT | Mod: GP | Performed by: PHYSICAL THERAPIST

## 2019-08-13 ENCOUNTER — ALLIED HEALTH/NURSE VISIT (OUTPATIENT)
Dept: ALLERGY | Facility: CLINIC | Age: 34
End: 2019-08-13
Payer: MEDICAID

## 2019-08-13 DIAGNOSIS — Z53.9 ERRONEOUS ENCOUNTER--DISREGARD: Primary | ICD-10-CM

## 2019-08-13 DIAGNOSIS — Z51.6 NEED FOR DESENSITIZATION TO ALLERGENS: Primary | ICD-10-CM

## 2019-08-13 PROCEDURE — 95117 IMMUNOTHERAPY INJECTIONS: CPT

## 2019-08-13 PROCEDURE — 99207 ZZC DROP WITH A PROCEDURE: CPT

## 2019-08-14 ENCOUNTER — HOSPITAL ENCOUNTER (OUTPATIENT)
Dept: PHYSICAL THERAPY | Facility: CLINIC | Age: 34
Setting detail: THERAPIES SERIES
End: 2019-08-14
Attending: NURSE PRACTITIONER
Payer: MEDICAID

## 2019-08-14 PROCEDURE — 97110 THERAPEUTIC EXERCISES: CPT | Mod: GP,59 | Performed by: PHYSICAL THERAPIST

## 2019-08-14 PROCEDURE — 97140 MANUAL THERAPY 1/> REGIONS: CPT | Mod: GP | Performed by: PHYSICAL THERAPIST

## 2019-08-14 PROCEDURE — 90911 ZZHC PT BIOFEEDBACK (PFM): CPT | Mod: GP | Performed by: PHYSICAL THERAPIST

## 2019-08-20 ENCOUNTER — PATIENT OUTREACH (OUTPATIENT)
Dept: CARE COORDINATION | Facility: CLINIC | Age: 34
End: 2019-08-20

## 2019-08-20 ASSESSMENT — ACTIVITIES OF DAILY LIVING (ADL): DEPENDENT_IADLS:: INDEPENDENT

## 2019-08-20 NOTE — PROGRESS NOTES
Clinic Care Coordination Contact    Follow Up Progress Note      Assessment: SW and pt discussed that pt is not happy with her current Psych provider for medication management and she wants this writer to find her another medication provider.      SW asked the pt why she feels her current provider of over a year is not a good fit for her & pt shared that her parents to not like him, therefore, she wonders if she should continue to stay with him.  Pt and SW discussed what traits the pt is looking for in a provider.  Pt stated she wants someone female, if possible.  SW and pt discussed the option of see if there is a female Psych provider at [facility] that she can see and explained that having providers in 1 agency can be helpful.  Pt said she likes this idea & will call [facility] once we hang up.    SW and pt also discussed that pt has registered for mental health day programming at [facility], which will begin on 9-4-19.  Pt expressed she is not that interested in the program anymore, as she feels that she may not get benefit from attending.  SW began to ask the pt about her day program & feelings of it not being helpful when pt became upset with this writer, telling me she does not feel I am being helpful to her today, as my questions are not pertinent to her needs today.  SW apologized to the pt, but also explained that if I am not understanding the pt's perspective, I am trying to better understand her needs.    Pt expressed frustration in not knowing if she is on the correct medications and not knowing what her next steps are.  SW advised the pt to have a discussion with her therapist about her needs/expectations and to create a plan of care for finding a new Psych provider. If this does not work, pt will also discuss her needs with her [facility] health SW.  Pt agreed with this idea.      SW placed call to Eileen Stone, pt's mental health SW & left a message.  Awaiting a return  call.    Goals addressed this encounter:   Goals Addressed                 This Visit's Progress       Patient Stated      Mental Health Management (pt-stated)   On track     Goal Statement: I want to attend another mental health day program beginning in September, 2019  Measure of Success: Pt will enroll & engage in a day program, if eligible for services.  Supportive Steps to Achieve: Pt, care team & this writer to find appropriate program to meet the pt's needs.  Barriers: N/A  Strengths: Pt appears motivated to engage with another day program once her daughter returns to school  Date to Achieve By: September 30, 2019  Patient expressed understanding of goal: Yes              Intervention/Education provided during outreach: See above.     Outreach Frequency: monthly    Plan:   Pt to see if [facility] has available female Psych providers  Care Coordinator will follow up in 1 month.    Pt has an extensive care team that supports her in the community.  Please see Care Team tab.    Sridevi Jiménez [facility]  Primary Care Clinic- Social Work Care Coordinator  [facility]  8/20/2019 12:14 PM  [phone]

## 2019-08-21 ASSESSMENT — ACTIVITIES OF DAILY LIVING (ADL): DEPENDENT_IADLS:: INDEPENDENT
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
## 2019-08-22 ENCOUNTER — HOSPITAL ENCOUNTER (OUTPATIENT)
Dept: PHYSICAL THERAPY | Facility: CLINIC | Age: 34
Setting detail: THERAPIES SERIES
End: 2019-08-22
Attending: NURSE PRACTITIONER
Payer: MEDICAID

## 2019-08-22 PROCEDURE — 97140 MANUAL THERAPY 1/> REGIONS: CPT | Mod: GP | Performed by: PHYSICAL THERAPIST

## 2019-08-22 PROCEDURE — 97110 THERAPEUTIC EXERCISES: CPT | Mod: GP | Performed by: PHYSICAL THERAPIST

## 2019-08-27 ENCOUNTER — OFFICE VISIT - HEALTHEAST (OUTPATIENT)
Dept: SURGERY | Facility: CLINIC | Age: 34
End: 2019-08-27

## 2019-08-27 ENCOUNTER — TRANSFERRED RECORDS (OUTPATIENT)
Dept: HEALTH INFORMATION MANAGEMENT | Facility: CLINIC | Age: 34
End: 2019-08-27

## 2019-08-27 DIAGNOSIS — K21.9 GASTROESOPHAGEAL REFLUX DISEASE WITHOUT ESOPHAGITIS: ICD-10-CM

## 2019-08-27 DIAGNOSIS — E66.811 OBESITY, CLASS I, BMI 30.0-34.9 (SEE ACTUAL BMI): ICD-10-CM

## 2019-08-27 ASSESSMENT — MIFFLIN-ST. JEOR: SCORE: 1596.94

## 2019-09-10 ENCOUNTER — ALLIED HEALTH/NURSE VISIT (OUTPATIENT)
Dept: ALLERGY | Facility: CLINIC | Age: 34
End: 2019-09-10
Payer: MEDICAID

## 2019-09-10 DIAGNOSIS — Z51.6 NEED FOR DESENSITIZATION TO ALLERGENS: Primary | ICD-10-CM

## 2019-09-10 PROCEDURE — 95117 IMMUNOTHERAPY INJECTIONS: CPT

## 2019-09-10 PROCEDURE — 99207 ZZC DROP WITH A PROCEDURE: CPT

## 2019-10-08 ENCOUNTER — ALLIED HEALTH/NURSE VISIT (OUTPATIENT)
Dept: ALLERGY | Facility: CLINIC | Age: 34
End: 2019-10-08
Payer: MEDICAID

## 2019-10-08 DIAGNOSIS — Z51.6 NEED FOR DESENSITIZATION TO ALLERGENS: Primary | ICD-10-CM

## 2019-10-08 PROCEDURE — 99207 ZZC DROP WITH A PROCEDURE: CPT

## 2019-10-08 PROCEDURE — 95117 IMMUNOTHERAPY INJECTIONS: CPT

## 2019-10-08 RX ORDER — EPINEPHRINE 0.3 MG/.3ML
0.3 INJECTION SUBCUTANEOUS PRN
Qty: 0.6 ML | Refills: 3 | Status: SHIPPED | OUTPATIENT
Start: 2019-10-08 | End: 2021-11-05

## 2019-10-08 NOTE — TELEPHONE ENCOUNTER
Last office visit 3/13/2019 with Dr Arizmendi. Pt does not currently of epinephrine auto injector on her active medication list. Pt will need need prescription for her AIT. Unable to refill per protocol.     [facility]. Cued up.    Callie VELASQUEZ   Allergy RN

## 2019-10-08 NOTE — PROGRESS NOTES
Patient presented after waiting 30 minutes with no reaction to  injections. Discharged from clinic.    Callie VELASQUEZ   Allergy MARY ANNE

## 2019-10-11 ENCOUNTER — OFFICE VISIT (OUTPATIENT)
Dept: FAMILY MEDICINE | Facility: CLINIC | Age: 34
End: 2019-10-11
Payer: MEDICAID

## 2019-10-11 VITALS
HEIGHT: 63 IN | HEART RATE: 93 BPM | WEIGHT: 203 LBS | TEMPERATURE: 99.3 F | BODY MASS INDEX: 35.97 KG/M2 | DIASTOLIC BLOOD PRESSURE: 88 MMHG | SYSTOLIC BLOOD PRESSURE: 124 MMHG | OXYGEN SATURATION: 98 %

## 2019-10-11 DIAGNOSIS — M53.3 COCCYDYNIA: Primary | ICD-10-CM

## 2019-10-11 DIAGNOSIS — L70.0 ACNE VULGARIS: ICD-10-CM

## 2019-10-11 DIAGNOSIS — H60.393 INFECTIVE OTITIS EXTERNA, BILATERAL: ICD-10-CM

## 2019-10-11 PROCEDURE — 99214 OFFICE O/P EST MOD 30 MIN: CPT | Performed by: NURSE PRACTITIONER

## 2019-10-11 RX ORDER — PHENOL 1.4 %
10 AEROSOL, SPRAY (ML) MUCOUS MEMBRANE
Status: ON HOLD | COMMUNITY
End: 2022-03-29

## 2019-10-11 RX ORDER — ADAPALENE 0.1 G/100G
CREAM TOPICAL AT BEDTIME
Qty: 45 G | Refills: 3 | Status: SHIPPED | OUTPATIENT
Start: 2019-10-11 | End: 2020-08-13

## 2019-10-11 RX ORDER — NEOMYCIN SULFATE, POLYMYXIN B SULFATE, HYDROCORTISONE 3.5; 10000; 1 MG/ML; [USP'U]/ML; MG/ML
3 SOLUTION/ DROPS AURICULAR (OTIC) 4 TIMES DAILY
Qty: 10 ML | Refills: 0 | Status: SHIPPED | OUTPATIENT
Start: 2019-10-11 | End: 2020-03-17

## 2019-10-11 RX ORDER — ARIPIPRAZOLE 10 MG/1
5 TABLET ORAL DAILY
Refills: 1 | COMMUNITY
Start: 2019-09-27 | End: 2020-08-13

## 2019-10-11 ASSESSMENT — MIFFLIN-ST. JEOR: SCORE: 1589.93
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
## 2019-10-11 NOTE — PATIENT INSTRUCTIONS
Make an appointment with ortho to discuss your tailbone pain.    For ears:  Use the drops in both ears four times daily for one week.    For acne:  Apply the Differin cream to clean face at bedtime.      Thank you for choosing [facility].  You may be receiving an email and/or telephone survey request from [facility] regarding your visit today.  Please take a few minutes to respond to the survey to let us know how we are doing.      If you have questions or concerns, please contact us via [employer] or you can contact your care team at [phone].    Our Clinic hours are:  Monday 6:40 am  to 7:00 pm  Tuesday -Friday 6:40 am to 5:00 pm    The [place] outpatient lab hours are:  Monday - Friday 6:10 am to 4:45 pm  Saturdays 7:00 am to 11:00 am  Appointments are required, call [phone]    If you have clinical questions after hours or would like to schedule an appointment,  call the clinic at [phone].

## 2019-10-29 LAB — PHQ9 SCORE: 2

## 2019-10-30 NOTE — PROGRESS NOTES
PHYSICAL THERAPY DISCHARGE SUMMARY    DATE:  10/30/2019  NAME:  Maddy Ortiz           MR#:  [id number]  YOB: 1985  Diagnosis:  LBP  Referring Physician:  Shirley Freeman NP    Patient was seen from 5/9/19 to 8/12/19.    Session Number: 14 Medicaid      Subjective Report: Is better, but thinks it is more because she is staying at home more, and can lie down on bed and sit in her favorite chair ( kind of like a bar stool with a backrest, but is shorter than a traditional bar stool).       Objective Measurements:  Objective Measure: Pelvic Alignment  Details: Moderate transverse torsion, WNL in sagittal plane  Objective Measure: Pain  Details: Discomfort today, not pain.             Goals:    Goal Identifier 1   Goal Description  Patient can go from Sit<>stand without pain.  Met: pt states she is not really in pain right now. (cont for 6 weeks)   Target Date 08/30/19   Date Met  08/22/19   Progress:     Goal Identifier 2   Goal Description Patient can sit for x>30 minutes without pain.  Met: was able to sit for 30 mins and then pain began in waiting room. (Cont for 2 weeks. )   Target Date 07/04/19   Date Met  07/19/19   Progress:     Goal Identifier 3   Goal Description Patient will be IND with HEP in order to aid in recovery.  Met: pt able to demo full and updated HEP today.    Target Date 08/30/19   Date Met  08/22/19   Progress:         Progress Toward Goals:   Progress this reporting period: Goals minimally met.    Plan:  Discharge from therapy.Cont with HEP on her own.    Reason for Discharge:  Patient has met all goals.      Renee Sutton, PT, MA  #[id number]

## 2019-11-01 ENCOUNTER — HOSPITAL ENCOUNTER (OUTPATIENT)
Dept: BEHAVIORAL HEALTH | Facility: CLINIC | Age: 34
Discharge: HOME | End: 2019-11-01
Attending: PSYCHIATRY & NEUROLOGY | Admitting: PSYCHIATRY & NEUROLOGY
Payer: MEDICAID

## 2019-11-01 PROCEDURE — 90791 PSYCH DIAGNOSTIC EVALUATION: CPT | Performed by: COUNSELOR

## 2019-11-01 ASSESSMENT — COLUMBIA-SUICIDE SEVERITY RATING SCALE - C-SSRS
3. HAVE YOU BEEN THINKING ABOUT HOW YOU MIGHT KILL YOURSELF?: NO
4. HAVE YOU HAD THESE THOUGHTS AND HAD SOME INTENTION OF ACTING ON THEM?: NO
TOTAL  NUMBER OF ABORTED OR SELF INTERRUPTED ATTEMPTS PAST 3 MONTHS: NO
REASONS FOR IDEATION LIFETIME: MOSTLY TO END OR STOP THE PAIN (YOU COULDN'T GO ON LIVING WITH THE PAIN OR HOW YOU WERE FEELING)
TOTAL  NUMBER OF INTERRUPTED ATTEMPTS PAST 3 MONTHS: NO
5. HAVE YOU STARTED TO WORK OUT OR WORKED OUT THE DETAILS OF HOW TO KILL YOURSELF? DO YOU INTEND TO CARRY OUT THIS PLAN?: NO
1. IN THE PAST MONTH, HAVE YOU WISHED YOU WERE DEAD OR WISHED YOU COULD GO TO SLEEP AND NOT WAKE UP?: YES
5. HAVE YOU STARTED TO WORK OUT OR WORKED OUT THE DETAILS OF HOW TO KILL YOURSELF? DO YOU INTEND TO CARRY OUT THIS PLAN?: NO
ATTEMPT PAST THREE MONTHS: NO
2. HAVE YOU ACTUALLY HAD ANY THOUGHTS OF KILLING YOURSELF?: NO
1. IN THE PAST MONTH, HAVE YOU WISHED YOU WERE DEAD OR WISHED YOU COULD GO TO SLEEP AND NOT WAKE UP?: NO
2. HAVE YOU ACTUALLY HAD ANY THOUGHTS OF KILLING YOURSELF LIFETIME?: NO
4. HAVE YOU HAD THESE THOUGHTS AND HAD SOME INTENTION OF ACTING ON THEM?: NO
6. HAVE YOU EVER DONE ANYTHING, STARTED TO DO ANYTHING, OR PREPARED TO DO ANYTHING TO END YOUR LIFE?: NO
TOTAL  NUMBER OF ABORTED OR SELF INTERRUPTED ATTEMPTS PAST LIFETIME: NO
TOTAL  NUMBER OF INTERRUPTED ATTEMPTS LIFETIME: NO
ATTEMPT LIFETIME: NO
6. HAVE YOU EVER DONE ANYTHING, STARTED TO DO ANYTHING, OR PREPARED TO DO ANYTHING TO END YOUR LIFE?: NO

## 2019-11-01 ASSESSMENT — PAIN SCALES - GENERAL: PAINLEVEL: NO PAIN (0)
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
## 2019-11-05 ENCOUNTER — NURSE TRIAGE (OUTPATIENT)
Dept: NURSING | Facility: CLINIC | Age: 34
End: 2019-11-05

## 2019-11-05 NOTE — TELEPHONE ENCOUNTER
"Maddy has a migraine.  Maddy took over the counter medication and it is not working.  Debora states that \"this is the worst headache\", normally it will go away but this headache is not.       Reason for Disposition    [1] SEVERE headache (e.g., excruciating) AND [2] \"worst headache\" of life    Additional Information    Negative: Difficult to awaken or acting confused (e.g., disoriented, slurred speech)    Negative: [1] Weakness of the face, arm or leg on one side of the body AND [2] new onset    Negative: [1] Numbness of the face, arm or leg on one side of the body AND [2] new onset    Negative: [1] Loss of speech or garbled speech AND [2] new onset    Negative: Passed out (i.e., lost consciousness, collapsed and was not responding)    Negative: Sounds like a life-threatening emergency to the triager    Negative: Unable to walk, or can only walk with assistance (e.g., requires support)    Negative: Stiff neck (can't touch chin to chest)    Negative: Severe pain in one eye    Negative: [1] Other family members (or roommates) with headaches AND [2] possibility of carbon monoxide exposure    Protocols used: HEADACHE-A-AH      "

## 2019-11-05 NOTE — TELEPHONE ENCOUNTER
Pt calling back to see if she should call 911 for ambulance transport to the hospital due to her pain.  Pt also questions if she should wait until 8am when she can get a ride to the ED.      Education given on both options.  She verbalized understanding and had no further questions, she plans to wait until she can get a non-ambulance ride at 8am.     Milla Elaine RN/FNA

## 2019-11-07 ENCOUNTER — HOSPITAL ENCOUNTER (OUTPATIENT)
Dept: BEHAVIORAL HEALTH | Facility: CLINIC | Age: 34
End: 2019-11-07
Attending: PSYCHIATRY & NEUROLOGY
Payer: MEDICAID

## 2019-11-07 ENCOUNTER — TELEPHONE (OUTPATIENT)
Facility: CLINIC | Age: 34
End: 2019-11-07

## 2019-11-07 PROCEDURE — G0177 OPPS/PHP; TRAIN & EDUC SERV: HCPCS

## 2019-11-07 ASSESSMENT — ANXIETY QUESTIONNAIRES
2. NOT BEING ABLE TO STOP OR CONTROL WORRYING: NEARLY EVERY DAY
GAD7 TOTAL SCORE: 11
6. BECOMING EASILY ANNOYED OR IRRITABLE: NOT AT ALL
1. FEELING NERVOUS, ANXIOUS, OR ON EDGE: SEVERAL DAYS
IF YOU CHECKED OFF ANY PROBLEMS ON THIS QUESTIONNAIRE, HOW DIFFICULT HAVE THESE PROBLEMS MADE IT FOR YOU TO DO YOUR WORK, TAKE CARE OF THINGS AT HOME, OR GET ALONG WITH OTHER PEOPLE: EXTREMELY DIFFICULT
7. FEELING AFRAID AS IF SOMETHING AWFUL MIGHT HAPPEN: NOT AT ALL
5. BEING SO RESTLESS THAT IT IS HARD TO SIT STILL: SEVERAL DAYS
3. WORRYING TOO MUCH ABOUT DIFFERENT THINGS: NEARLY EVERY DAY

## 2019-11-07 ASSESSMENT — PATIENT HEALTH QUESTIONNAIRE - PHQ9
SUM OF ALL RESPONSES TO PHQ QUESTIONS 1-9: 11
5. POOR APPETITE OR OVEREATING: NEARLY EVERY DAY

## 2019-11-07 NOTE — GROUP NOTE
Life Skills/OT Group Note    PATIENT'S NAME: Maddy Ortiz  MRN:   [id number]  :   1985  ACCT. NUMBER: [id number]  DATE OF SERVICE: 19  START TIME:  1:00 PM  END TIME:  1:50 PM  FACILITATOR: Richard Eddy OT  TOPIC:  Life Skills Group: Sensory Approaches in Mental Health  Adult Mental Health Day Treatment  TRACK: 6B    NUMBER OF PARTICIPANTS: 3    Summary of Group / Topics Discussed:  Sensory Approaches in Mental Health:  Sensory Enhanced Mindfulness: Patients were taught and provided with an opportunity to explore and practice how using sensory enhanced mindfulness practices can help them stay grounded in the present moment as a way to manage mental health symptoms and stressors.         Patient Session Goals / Objectives:    Identified how using sensory enhanced mindfulness practices can be used for grounding, stress management, and self regulation      Improved awareness of different types of sensory enhanced mindfulness activities that assist with healthy coping of stress and symptoms      Established a plan for practice of these skills in their own environments    Practiced and reflected on how to generalize taught skills to their everyday life        Patient Participation / Response:  Moderately participated, sharing some personal reflections / insights and adequately adequately received / provided feedback with other participants.    Patient presentation: first day first session. Appeared anxious initially. shared some. Reports feeling confused today but able to participate. , Verbalized understanding of content and Patient would benefit from additional opportunities to practice the content to be able to generalize it to their everyday life with increased intentionality, consistency, and efficacy in support of their psychiatric recovery    Treatment Plan:  Patient has an initial individualized treatment plan that was created as part of their diagnostic assessment / admission process.  A  master individualized treatment plan is in the process of being developed with the patient and multi-disciplinary care team.    Richard Eddy, OT

## 2019-11-07 NOTE — TELEPHONE ENCOUNTER
Phone call to Maddy, who said that she felt confused  So she was at the front door waiting for her ride to   Take her home.  Staff talked to her and she agreed  To return next week.    Jacek Spaulding., D,  L.P.

## 2019-11-08 ASSESSMENT — ANXIETY QUESTIONNAIRES: GAD7 TOTAL SCORE: 11

## 2019-11-08 NOTE — PROGRESS NOTES
LOCUS Worksheet     Name: Maddy Ortiz MRN: [id number]    : 1985      Gender:  female    PMI:   [id number]  Provider Name: CARLOS ALBERTO Gonzales MA, LADC  Provider NPI:  [id number]    Actual level of Care Provided:  DA.    Service(s) receiving or referred to:  Day Tx.  Reason for Variance:  Treatment      Rating completed by:  CARLOS ALBERTO Gonzales MA, LADC       I. Risk of Harm:   3      Moderate Risk of Harm    II. Functional Status:   3      Moderate Impairment    III. Co-Morbidity:   2      Minor Co-Morbidity    IV - A. Recovery Environment - Level of Stress:   2      Mildly Stressful Environment    IV - B. Recovery Environment - Level of Support:   3      Limited Support in Environment    V. Treatment and Recovery History:   3      Moderate to Equivocal Response to Treatment and Recovery Management    VI. Engagement and Recovery Project:   3      Limited Engagement and Recovery       19 Composite Score    Level of Care Recommendation:   17 to 19       High Intensity Community Based Services

## 2019-11-08 NOTE — PROGRESS NOTES
Functional Assessment       Recipient Medicaid ID (PMI) #:    Recipient Social Security #:     Name: Maddy Ortiz MRN: [id number]    : 1985      Time of functional assessment: Initial    Diagnostic assessment date:  2019 Functional assessment date:  2019      1. Mental health symptoms  Moderate Problem    2. Mental health service needs  Moderate Problem    3. Use of drugs or alcohol  No Problem    4. Vocational functioning  Slight Problem    5. Educational functioning  Moderate Problem    6. Social functioning, including use of leisure time  Moderate Problem    7. Interpersonal functioning, including relationships with the adult's family  Moderate Problem    8. Self-care and independent living capacity  Moderate Problem    9. Medical health  Slight Problem    10. Dental health  No Problem    11. Obtaining and maintaining financial assistance  Slight Problem    12. Obtaining and maintaining housing  No Problem    13. Using transportation  Slight Problem    14. Other area -   Moderate Problem    The following areas will be part of the Treatment Plan:  Mental health symptoms  Mental health service needs  Interpersonal functioning, including relationships with the adult's family  Self-care and independent living capacity           Staff Signature:      Date:        ANNETTE LORA  Professional Signature:  Elías Sanford MA, LPCC, LADC      Date:  2019

## 2019-11-11 ENCOUNTER — HOSPITAL ENCOUNTER (OUTPATIENT)
Dept: BEHAVIORAL HEALTH | Facility: CLINIC | Age: 34
End: 2019-11-11
Attending: PSYCHIATRY & NEUROLOGY
Payer: MEDICAID

## 2019-11-11 PROCEDURE — G0177 OPPS/PHP; TRAIN & EDUC SERV: HCPCS

## 2019-11-11 PROCEDURE — 90853 GROUP PSYCHOTHERAPY: CPT | Performed by: PSYCHOLOGIST

## 2019-11-11 NOTE — GROUP NOTE
Process Group Note    PATIENT'S NAME: Maddy Ortiz  MRN:   [id number]  :   1985  ACCT. NUMBER: [id number]  DATE OF SERVICE: 19  START TIME:  1:00 PM  END TIME:  1:50 PM  FACILITATOR: Nely Arthur PsyD  TOPIC:  Process Group      Adult Mental Health Day Treatment  TRACK: 6B    NUMBER OF PARTICIPANTS: 3        Diagnoses:  DSM5 Diagnoses: (Sustained by DSM5 Criteria Listed Above)  Diagnoses:      295.70  (F25) Schizoaffective Disorder Bipolar Type  300.00 (F41.9) Unspecified Anxiety Disorder   Consider F22 Paranoid-Type Delusional Disorder    Data:  Session content: At the start of this group, patients were invited to check in by identifying themselves, describing their current emotional status, and identifying issues to address in this group.   Area(s) of treatment focus addressed in this session included Symptom Management, Personal Safety and Community Resources/Discharge Planning.                 Maddy reported being safe today. She listened to others and was respectful.  She reported that she did things with her daughter on the past weekend and went to [employer].  She stated that she took her medications, her mood was ok, but she was crying on the weekend. She reported some problems with poor sleep, but got better sleep last night.  When asked about psychosis symptoms, there was a long silence and she didn't answer the question and skipped to another section.              Therapeutic Interventions/Treatment Strategies:  Psychotherapist offered support, feedback and validation and reinforced use of skills. Treatment modalities used include Motivational Interviewing, Cognitive Behavioral Therapy and Dialectical Behavioral Therapy. Interventions include Cognitive Restructuring:  Assisted patient in formulating new neutral/positive alternatives to challenge less helpful / ineffective thoughts and Assisted patient in identifying new neutral/positive core beliefs, Coping Skills: Facilitated discussion on  "learning and applying radical acceptance skill, Discussed use of self-soothe skills to decrease distress in the body, Assisted patient in identifying 1-2 healthy distraction skills to reduce overall distress, Discussed how the use of intentional \"in the moment\" actions can help reduce distress, Reviewed patients current calming practices and discussed a more formal way of practicing and accessing skills, Promoted understanding of how and when to apply grounding strategies to reduce distress and increase presence in the moment, Discussed meditation skills and addressed ways to implement meditation skills , Assisted patient in understanding the purpose of planning / creating / participating / sharing in positive experiences and Addressed barriers to utilizing coping skills when in distress, Mindfulness: Facilitated discussion of when/how to use mindfulness skills to benefit general health, mental health symptoms, and stressors and Encouraged a plan to use mindfulness skills in daily life, Symptoms Management: Promoted understanding of their diagnoses and how it impacts their functioning, Emotions Management:  Reinforced the purpose and biological basis of emotions, Discussed barriers to emotional regulation, Reviewed opposite action skill and Increased awareness of daily mood patterns/changes and Relationship Skills: Assisted patients in implementing more effective communication skills in their relationships, Encouraged development and maintenance  of healthy boundaries and Discussed strategies to promote healthier understanding of interpersonal relationships.     Assessment:     Patient response:   Patient responded to session by accepting feedback, giving feedback, listening, focusing on goals, being attentive, accepting support and appearing alert     Possible barriers to participation / learning include: and no barriers identified     Health Issues:              None reported     Substance Use " Review:              Substance Use: No active concerns identified.     Mental Status/Behavioral Observations  Appearance:                            Appropriate   Eye Contact:                           Good   Psychomotor Behavior:          Normal   Attitude:                                   Cooperative   Orientation:                             All  Speech              Rate / Production:       Normal               Volume:                       Normal   Mood:                                      Normal  Affect:                                      Appropriate      Thought Content:                    Clear and Psychosis denies any symptoms of psychosis, however reported confusion   Thought Form:                        Coherent  she is hesitant to talk about symptoms  Insight:                                     Fair      Plan:     Safety Plan: No current safety concerns identified.  Recommended that patient call 911 or go to the local ED should there be a change in any of these risk factors.     Barriers to treatment: None identified    Patient Contracts (see media tab):  None    Substance Use: Not addressed in session     Continue or Discharge: Patient will continue in Day Treatment (DT)  as planned. Patient is likely to benefit from learning and using skills as they work toward the goals identified in their treatment plan.     Jacek Spaulding., D,  L.P.    November 11, 2019

## 2019-11-11 NOTE — GROUP NOTE
RN Group Note    PATIENT'S NAME: Maddy Otriz  MRN:   [id number]  :   1985  ACCT. NUMBER: [id number]  DATE OF SERVICE: 19  START TIME:  3:00 PM  END TIME:  3:50 PM  FACILITATOR: Shirley Cooper RN  TOPIC:  RN Group: Health Maintenance  Adult Mental Health Day Treatment  TRACK: 6B    NUMBER OF PARTICIPANTS: 3    Summary of Group / Topics Discussed:  Health Maintenance: Goal Setting: Meaningful goals can bring a sense of direction and purpose in life.  They also highlight our most important values. Patients were assisted by instructor to identify short term goals to promote their mental health recovery and improve overall health and wellness. Patients were educated on SMART goal setting framework as a strategy to increase outcomes and promote success.    Patient Session Goals / Objectives:  ? Explained the key concepts of SMART goal setting framework  ? Identified three goals successfully using SMART goal setting framework  ? Reviewed concept of balance in wellness as it pertains to goal setting        Patient Participation / Response:  Moderately participated, sharing some personal reflections / insights and adequately adequately received / provided feedback with other participants.    Identified / Expressed personal readiness to practice skills    Treatment Plan:  Patient has an initial individualized treatment plan that was created as part of their diagnostic assessment / admission process.  A master individualized treatment plan is in the process of being developed with the patient and multi-disciplinary care team.    Shirley Cooper RN

## 2019-11-12 ENCOUNTER — TELEPHONE (OUTPATIENT)
Dept: BEHAVIORAL HEALTH | Facility: CLINIC | Age: 34
End: 2019-11-12

## 2019-11-12 NOTE — GROUP NOTE
Life Skills/OT Group Note    PATIENT'S NAME: Maddy Ortiz  MRN:   [id number]  :   1985  ACCT. NUMBER: [id number]  DATE OF SERVICE: 19  START TIME:  2:00 PM  END TIME:  2:50 PM  FACILITATOR: Johnny Maldonado OTR/L  TOPIC:  Life Skills Group: Cognitive Functioning  Adult Mental Health Day Treatment  TRACK: 6B    NUMBER OF PARTICIPANTS: 3    Summary of Group / Topics Discussed:  Cognitive Functioning(Active Listening Skills): Patients were taught and provided with an opportunity to gain awareness of how their mental health symptoms impact their current cognitive functioning as well as how this impacts their performance and participation in meaningful roles, relationships, and routines.  Patients were taught skills and strategies on how to monitor and improve cognitive performance through remediation or compensatory strategies.  Patients were given opportunities to practice taught skills and techniques in session and how to apply to everyday life.        Patient Session Goals / Objectives:    Identified how their mental health symptoms impact their functioning, focusing on specific cognitive challenges     Improved awareness of specific remediation and/or compensatory strategies to improve  executive functioning skills and how this relates to mental health recovery        Established a plan for practice of these skills in their own environments    Practiced and reflected on how to generalize taught skills to their everyday life          Patient Participation / Response:  Moderately participated, sharing some personal reflections / insights and adequately adequately received / provided feedback with other participants.    Patient presentation: Calm,alert,focused with stable mood and thought process.Patient scored an 18/30 on a mental health recovery scale. and Patient would benefit from additional opportunities to practice the content to be able to generalize it to their everyday life with increased  intentionality, consistency, and efficacy in support of their psychiatric recovery    Treatment Plan:  Patient has a current master individualized treatment plan.  See Epic treatment plan for more information.    Johnny Maldonado, OTR/L

## 2019-11-12 NOTE — TELEPHONE ENCOUNTER
Phone call from Maddy, who had a problem with a lost key  To the apartment for her daughter.    Nely Arthur, Jacek., D,  L.P.

## 2019-11-14 ENCOUNTER — TELEPHONE (OUTPATIENT)
Dept: BEHAVIORAL HEALTH | Facility: CLINIC | Age: 34
End: 2019-11-14
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
## 2019-11-14 NOTE — TELEPHONE ENCOUNTER
Phone call to Maddy, who said that she will need to stay  At home and take care of her daughter. She said that she  Needs to make a key for her.  She feels she needs to be home before school.    Jacek Spaulding., D,  L.P.

## 2019-11-18 ENCOUNTER — TELEPHONE (OUTPATIENT)
Dept: BEHAVIORAL HEALTH | Facility: CLINIC | Age: 34
End: 2019-11-18

## 2019-11-18 NOTE — TELEPHONE ENCOUNTER
Phone call to Maddy and coordinated care around her  Discharge, as she can't make it to the program  Since her daughter lost the key to the apartment.    She will try to do individual therapy.    Jacek Spaulding., D,  L.P.    
No

## 2019-11-18 NOTE — TELEPHONE ENCOUNTER
Phone call to [facility] : Albino Stone  To leave a message that Maddy was having problems  Coming, due to her daughter losing a key to the apartment.    Jacek Spaulding., D,  L.P.

## 2019-11-20 ENCOUNTER — TRANSFERRED RECORDS (OUTPATIENT)
Dept: HEALTH INFORMATION MANAGEMENT | Facility: CLINIC | Age: 34
End: 2019-11-20

## 2019-11-21 ENCOUNTER — TELEPHONE (OUTPATIENT)
Dept: BEHAVIORAL HEALTH | Facility: CLINIC | Age: 34
End: 2019-11-21

## 2019-12-02 ENCOUNTER — NURSE TRIAGE (OUTPATIENT)
Dept: NURSING | Facility: CLINIC | Age: 34
End: 2019-12-02

## 2019-12-02 NOTE — TELEPHONE ENCOUNTER
"Around noon Pt started with a headache. Drank water and took tylenol. It has gotten progressively worse throughout the afternoon and now she is nauseated. Advised to be seen at [facility] or ED which is the same check in desk and they can help her decide which is best place for her to be seen. Advised to have someone else drive her.     Mariola Anguiano RN  [facility]  Triage Nurse Advisors      Reason for Disposition    [1] SEVERE headache (e.g., excruciating) AND [2] not improved after 2 hours of pain medicine    Additional Information    Negative: Difficult to awaken or acting confused (e.g., disoriented, slurred speech)    Negative: [1] Weakness of the face, arm or leg on one side of the body AND [2] new onset    Negative: [1] Numbness of the face, arm or leg on one side of the body AND [2] new onset    Negative: [1] Loss of speech or garbled speech AND [2] new onset    Negative: Passed out (i.e., lost consciousness, collapsed and was not responding)    Negative: Sounds like a life-threatening emergency to the triager    Negative: Followed a head injury within last 3 days    Negative: Pregnant    Negative: Traumatic Brain Injury (TBI) is suspected    Negative: Unable to walk, or can only walk with assistance (e.g., requires support)    Negative: Stiff neck (can't touch chin to chest)    Negative: Severe pain in one eye    Negative: [1] Other family members (or roommates) with headaches AND [2] possibility of carbon monoxide exposure    Negative: [1] SEVERE headache (e.g., excruciating) AND [2] \"worst headache\" of life    Negative: [1] SEVERE headache AND [2] sudden-onset (i.e., reaching maximum intensity within seconds)    Negative: [1] SEVERE headache AND [2] fever    Negative: Loss of vision or double vision (Exception: same as prior migraines)    Negative: [1] Fever > 100.0 F (37.8 C) AND [2] diabetes mellitus or weak immune system (e.g., HIV positive, cancer chemo, splenectomy, chronic steroids)    " Negative: Patient sounds very sick or weak to the triager    Protocols used: HEADACHE-A-AH

## 2019-12-03 ENCOUNTER — TELEPHONE (OUTPATIENT)
Dept: ALLERGY | Facility: CLINIC | Age: 34
End: 2019-12-03

## 2019-12-03 NOTE — TELEPHONE ENCOUNTER
Patient requesting to restart allergy immunotherapy. Please advise restart dose for immunotherapy as well as duration of time restart dose is valid.    Patient currently has red vials available on site. If dose reduction requires new serum mixing for patient, please provide ample time for mixing when advising duration restart dose is valid.    Hx of reactions to immunotherapy: NO  Hx of asthma: NO      Top Dose: Red 0.5  Last injection given on 10/08/19.       Vial Color Content  Dose   Red 1:1 Cat, Dog, Dust Mite  0.5     Red 1:1 Molds  0.5     Red 1:1 Trees  0.5     Red 1:1 Weeds  0.5           Signature  Kenney Pena LPN

## 2019-12-03 NOTE — TELEPHONE ENCOUNTER
Decrease to 0.4mL of red vial for all injections. If tolerated build back to maintenance dose in 0.1mL increments every 3-14 days. Dose adjustment valid through 12/20/19.

## 2019-12-11 ENCOUNTER — OFFICE VISIT - HEALTHEAST (OUTPATIENT)
Dept: SURGERY | Facility: CLINIC | Age: 34
End: 2019-12-11

## 2019-12-11 DIAGNOSIS — E66.812 OBESITY, CLASS II, BMI 35-39.9, ISOLATED (SEE ACTUAL BMI): ICD-10-CM

## 2019-12-11 DIAGNOSIS — K21.9 GASTROESOPHAGEAL REFLUX DISEASE WITHOUT ESOPHAGITIS: ICD-10-CM

## 2019-12-11 DIAGNOSIS — Z71.3 NUTRITIONAL COUNSELING: ICD-10-CM

## 2019-12-11 DIAGNOSIS — E66.811 OBESITY, CLASS I, BMI 30.0-34.9 (SEE ACTUAL BMI): ICD-10-CM

## 2019-12-11 ASSESSMENT — MIFFLIN-ST. JEOR: SCORE: 1570.91

## 2019-12-17 ENCOUNTER — TRANSFERRED RECORDS (OUTPATIENT)
Dept: HEALTH INFORMATION MANAGEMENT | Facility: CLINIC | Age: 34
End: 2019-12-17

## 2019-12-20 ENCOUNTER — ALLIED HEALTH/NURSE VISIT (OUTPATIENT)
Dept: ALLERGY | Facility: CLINIC | Age: 34
End: 2019-12-20
Payer: MEDICAID

## 2019-12-20 DIAGNOSIS — Z51.6 NEED FOR DESENSITIZATION TO ALLERGENS: Primary | ICD-10-CM

## 2019-12-20 PROCEDURE — 99207 ZZC DROP WITH A PROCEDURE: CPT

## 2019-12-20 PROCEDURE — 95117 IMMUNOTHERAPY INJECTIONS: CPT

## 2019-12-23 ENCOUNTER — TRANSFERRED RECORDS (OUTPATIENT)
Dept: HEALTH INFORMATION MANAGEMENT | Facility: CLINIC | Age: 34
End: 2019-12-23

## 2019-12-27 ENCOUNTER — ALLIED HEALTH/NURSE VISIT (OUTPATIENT)
Dept: ALLERGY | Facility: CLINIC | Age: 34
End: 2019-12-27
Payer: MEDICAID

## 2019-12-27 ENCOUNTER — HOSPITAL ENCOUNTER (OUTPATIENT)
Dept: MRI IMAGING | Facility: CLINIC | Age: 34
Discharge: HOME | End: 2019-12-27
Attending: FAMILY MEDICINE | Admitting: FAMILY MEDICINE
Payer: MEDICAID

## 2019-12-27 DIAGNOSIS — Z51.6 NEED FOR DESENSITIZATION TO ALLERGENS: Primary | ICD-10-CM

## 2019-12-27 DIAGNOSIS — S39.92XA TAILBONE INJURY: ICD-10-CM

## 2019-12-27 PROCEDURE — 72195 MRI PELVIS W/O DYE: CPT

## 2019-12-27 PROCEDURE — 99207 ZZC DROP WITH A PROCEDURE: CPT

## 2019-12-27 PROCEDURE — 95117 IMMUNOTHERAPY INJECTIONS: CPT

## 2020-01-01 NOTE — PROGRESS NOTES
Pt visible and active w/in milieu throughout majority of evening. Pleasant and approachable but appeared dismissive and wanting to get through check in quickly. Pt reports that she is doing well this evening. Mood is good with no depression or anxiety. Denies psychotic sx and si/sib. No other notable bx this shift.         04/29/18 2000   Behavioral Health   Hallucinations denies / not responding to hallucinations   Thinking distractable;poor concentration   Orientation person: oriented;place: oriented;date: oriented;time: oriented   Memory baseline memory   Insight poor   Judgement impaired   Eye Contact at examiner   Affect blunted, flat   Mood mood is calm   Physical Appearance/Attire attire appropriate to age and situation;neat   Hygiene well groomed   Suicidality (denies)   1. Wish to be Dead No   2. Non-Specific Active Suicidal Thoughts  No   Self Injury (denies)   Elopement (no concerns)   Activity restless   Speech clear;coherent   Medication Sensitivity no stated side effects;no observed side effects   Psychomotor / Gait balanced;steady   Psycho Education   Type of Intervention 1:1 intervention   Response participates, initiates socially appropriate  (brief yes/no responses)   Hours 0.5   Treatment Detail check in   Activities of Daily Living   Hygiene/Grooming independent   Oral Hygiene independent   Dress street clothes;independent   Laundry with supervision   Room Organization independent   Activity   Activity Assistance Provided independent      35.25

## 2020-01-08 ENCOUNTER — TRANSFERRED RECORDS (OUTPATIENT)
Dept: HEALTH INFORMATION MANAGEMENT | Facility: CLINIC | Age: 35
End: 2020-01-08

## 2020-01-20 ENCOUNTER — TRANSFERRED RECORDS (OUTPATIENT)
Dept: HEALTH INFORMATION MANAGEMENT | Facility: CLINIC | Age: 35
End: 2020-01-20

## 2020-01-20 LAB — PHQ9 SCORE: 3

## 2020-01-31 ENCOUNTER — ALLIED HEALTH/NURSE VISIT (OUTPATIENT)
Dept: ALLERGY | Facility: CLINIC | Age: 35
End: 2020-01-31
Payer: MEDICAID

## 2020-01-31 DIAGNOSIS — Z51.6 NEED FOR DESENSITIZATION TO ALLERGENS: Primary | ICD-10-CM

## 2020-01-31 PROCEDURE — 99207 ZZC DROP WITH A PROCEDURE: CPT

## 2020-01-31 PROCEDURE — 95117 IMMUNOTHERAPY INJECTIONS: CPT

## 2020-02-06 ENCOUNTER — TRANSFERRED RECORDS (OUTPATIENT)
Dept: HEALTH INFORMATION MANAGEMENT | Facility: CLINIC | Age: 35
End: 2020-02-06

## 2020-02-10 ENCOUNTER — COMMUNICATION - HEALTHEAST (OUTPATIENT)
Dept: SURGERY | Facility: CLINIC | Age: 35
End: 2020-02-10

## 2020-02-12 ENCOUNTER — OFFICE VISIT - HEALTHEAST (OUTPATIENT)
Dept: SURGERY | Facility: CLINIC | Age: 35
End: 2020-02-12

## 2020-02-12 ENCOUNTER — TRANSFERRED RECORDS (OUTPATIENT)
Dept: HEALTH INFORMATION MANAGEMENT | Facility: CLINIC | Age: 35
End: 2020-02-12

## 2020-02-12 DIAGNOSIS — E66.812 OBESITY, CLASS II, BMI 35-39.9, ISOLATED (SEE ACTUAL BMI): ICD-10-CM

## 2020-02-12 DIAGNOSIS — K21.9 GASTROESOPHAGEAL REFLUX DISEASE WITHOUT ESOPHAGITIS: ICD-10-CM

## 2020-02-12 ASSESSMENT — MIFFLIN-ST. JEOR: SCORE: 1589.91

## 2020-02-20 ENCOUNTER — TRANSFERRED RECORDS (OUTPATIENT)
Dept: HEALTH INFORMATION MANAGEMENT | Facility: CLINIC | Age: 35
End: 2020-02-20

## 2020-03-03 ENCOUNTER — ALLIED HEALTH/NURSE VISIT (OUTPATIENT)
Dept: ALLERGY | Facility: CLINIC | Age: 35
End: 2020-03-03
Payer: COMMERCIAL

## 2020-03-03 DIAGNOSIS — Z51.6 NEED FOR DESENSITIZATION TO ALLERGENS: Primary | ICD-10-CM

## 2020-03-03 PROCEDURE — 99207 ZZC DROP WITH A PROCEDURE: CPT

## 2020-03-03 PROCEDURE — 95117 IMMUNOTHERAPY INJECTIONS: CPT

## 2020-03-04 ENCOUNTER — TRANSFERRED RECORDS (OUTPATIENT)
Dept: HEALTH INFORMATION MANAGEMENT | Facility: CLINIC | Age: 35
End: 2020-03-04

## 2020-03-09 ENCOUNTER — OFFICE VISIT (OUTPATIENT)
Dept: ALLERGY | Facility: CLINIC | Age: 35
End: 2020-03-09
Payer: COMMERCIAL

## 2020-03-09 VITALS
BODY MASS INDEX: 35.96 KG/M2 | HEART RATE: 73 BPM | WEIGHT: 203 LBS | SYSTOLIC BLOOD PRESSURE: 131 MMHG | DIASTOLIC BLOOD PRESSURE: 86 MMHG | OXYGEN SATURATION: 99 %

## 2020-03-09 DIAGNOSIS — J30.89 ALLERGIC RHINITIS DUE TO MOLD: ICD-10-CM

## 2020-03-09 DIAGNOSIS — J30.81 ALLERGIC RHINITIS DUE TO ANIMAL DANDER: ICD-10-CM

## 2020-03-09 DIAGNOSIS — J30.89 ALLERGIC RHINITIS DUE TO DUST MITE: ICD-10-CM

## 2020-03-09 DIAGNOSIS — J30.1 SEASONAL ALLERGIC RHINITIS DUE TO POLLEN: ICD-10-CM

## 2020-03-09 PROCEDURE — 99213 OFFICE O/P EST LOW 20 MIN: CPT | Performed by: ALLERGY & IMMUNOLOGY

## 2020-03-09 RX ORDER — AZELASTINE 1 MG/ML
2 SPRAY, METERED NASAL 2 TIMES DAILY
Qty: 1 BOTTLE | Refills: 5 | Status: SHIPPED | OUTPATIENT
Start: 2020-03-09 | End: 2020-05-29

## 2020-03-09 NOTE — LETTER
3/9/2020         RE: Maddy Ortiz  [address]  [facility] [address]        Dear Colleague,    Thank you for referring your patient, Maddy Ortiz, to the [facility]. Please see a copy of my visit note below.    Maddy Ortiz was seen in the Allergy Clinic at [facility]. The following are my recommendations regarding her Allergic Rhinitis Due to Animals, Allergic Rhinitis Due to Pollen, Allergic Rhinitis Due to Dust Mites and Allergic Rhinitis Due to Mold    1. Continue allergen immunotherapy per protocol  2. Continue to carry epinephrine auto-injector until expiration - will not need to refill prescription or carry epinephrine auto-injector once current prescription expires  3. Use azelastine nasal spray once to twice daily as needed  4. Follow-up in 1 year      Maddy Ortiz is a 34 year old Azeri female who is seen today for follow-up of allergic rhinitis. She began allergen immunotherapy in 11/2016 and reached her maintenance dose in 7/2018. She denies any prior history of systemic or significant large local reactions. Maddy feels that immunotherapy has been helpful in managing her symptoms. On occasion she does have nasal congestion and will take Sudafed though this is not frequent. She is not taking antihistamines as they have caused side effects of increased appetite, sedation, and distraction. She uses azelastine nasal spray periodically and has not needed to use any eye drops. Maddy has not had any sinus infections or other significant respiratory illnesses in the past year.      Past Medical History:   Diagnosis Date     Bipolar 1 disorder (H) 12/6/2012     Depressive disorder      Paranoid type delusional disorder (H) 11/14/2012     Family History   Adopted: Yes   Problem Relation Age of Onset     Unknown/Adopted Mother      Unknown/Adopted Father      Unknown/Adopted Other      Social History     Tobacco Use     Smoking status: Never Smoker     Smokeless  tobacco: Never Used   Substance Use Topics     Alcohol use: Not Currently     Comment: rare wine ( very rare)     Drug use: No       Past medical, family, and social history were reviewed.    REVIEW OF SYSTEMS:  General: negative for weight gain. negative for weight loss. negative for changes in sleep.   Eyes: negative for itching. negative for redness. negative for tearing/watering. negative for vision changes  Ears: negative for fullness. negative for hearing loss. negative for dizziness.   Nose: negative for snoring.negative for changes in smell. negative for drainage.   Throat: negative for hoarseness. negative for sore throat. negative for trouble swallowing.   Lungs: negative for cough. negative for shortness of breath.negative for wheezing. negative for sputum production.   Cardiovascular: negative for chest pain. negative for swelling of ankles. negative for fast or irregular heartbeat.   Gastrointestinal: negative for nausea. negative for heartburn. negative for acid reflux.   Musculoskeletal: negative for joint pain. negative for joint stiffness. negative for joint swelling.   Neurologic: negative for seizures. negative for fainting. negative for weakness.   Psychiatric: negative for changes in mood. negative for anxiety.   Endocrine: negative for cold intolerance. negative for heat intolerance. negative for tremors.   Hematologic: negative for easy bruising. negative for easy bleeding.  Integumentary: negative for rash. negative for scaling. negative for nail changes.       Current Outpatient Medications:      cariprazine (VRAYLAR) 1.5 MG CAPS capsule, Take by mouth daily, Disp: , Rfl:      levonorgestrel (MIRENA) 20 MCG/24HR IUD, 1 each (20 mcg) by Intrauterine route continuous, Disp: , Rfl:      Melatonin 10 MG TABS tablet, Take 10 mg by mouth nightly as needed for sleep, Disp: , Rfl:      ORDER FOR ALLERGEN IMMUNOTHERAPY, Cat Hair, Standardized 10,000 BAU/mL, ALK  3.0 ml Dog Hair Dander, A. P.  1:100  w/v, HS  1.0 ml Dust Mites F 30,000AU/mL, HS  0.5 ml Dust Mites P. 30,000 AU/mL, HS  0.5 ml  Diluent: HSA qs to 5ml, Disp: 5 mL, Rfl: PRN     ORDER FOR ALLERGEN IMMUNOTHERAPY, Alternaria Tenuis 1:10 w/v, HS  0.5 ml Epicoccum Nigrum 1:10 w/v, HS 0.5 ml Hormodendrum Cladosporioides 1:10 w/v, HS 0.5 ml Diluent: HSA qs to 5ml, Disp: 5 mL, Rfl: PRN     ORDER FOR ALLERGEN IMMUNOTHERAPY, Estuardo, White  1:20 w/v, HS  0.5 ml Birch Mix PRW 1:20 w/v, HS  0.5 ml Elm, American 1:20 w/v, HS  0.5 ml Hackberry 1:20 w/v, HS 0.5 ml Hickory, Shagbark 1:20 w/v, HS  0.5 ml [place] Mix RW 1:20 w/v, HS 0.5 ml Oak Mix RVW 1:20 w/v, HS 0.5 ml [place] Tree, Black 1:20 w/v, HS 0.5 ml [place], Black 1:20 w/v, HS 0.5 ml Diluent: HSA qs to 5ml, Disp: 5 mL, Rfl: PRN     ORDER FOR ALLERGEN IMMUNOTHERAPY, Kochia 1:20 w/v, HS 1.0 ml Nettle 1:20 w/v, HS 1.0 ml Plantain, English 1:20 w/v, HS 1.0 ml Ragweed Mixed 1:20 w/v ALK  0.6 ml Russian Thistle 1:20 w/v, HS 1.0 ml Diluent: HSA qs to 5ml, Disp: 5 mL, Rfl: PRN     adapalene (DIFFERIN) 0.1 % external cream, Apply topically At Bedtime (Patient not taking: Reported on 3/9/2020), Disp: 45 g, Rfl: 3     ARIPiprazole (ABILIFY) 10 MG tablet, Take 5 mg by mouth daily, Disp: , Rfl: 1     azelastine (ASTELIN) 0.1 % nasal spray, Spray 2 sprays into both nostrils 2 times daily (Patient not taking: Reported on 10/11/2019), Disp: 1 Bottle, Rfl: 5     cetirizine (ZYRTEC) 10 MG tablet, Take 10 mg by mouth 2 times daily as needed for allergies, Disp: , Rfl:      ClonazePAM (KLONOPIN PO), Take 1 mg by mouth daily as needed for anxiety , Disp: , Rfl:      EPINEPHrine (EPIPEN/ADRENACLICK/OR ANY BX GENERIC EQUIV) 0.3 MG/0.3ML injection 2-pack, Inject 0.3 mLs (0.3 mg) into the muscle as needed for anaphylaxis (Patient not taking: Reported on 3/9/2020), Disp: 0.6 mL, Rfl: 3     LATUDA 40 MG TABS tablet, 40 mg 3 tablets at night, Disp: , Rfl: 3     LATUDA 80 MG TABS tablet, 80 mg 2 times daily , Disp: , Rfl:       neomycin-polymyxin-hydrocortisone (CORTISPORIN) 3.5-25379-6 otic solution, Place 3 drops into both ears 4 times daily For one week (Patient not taking: Reported on 11/1/2019), Disp: 10 mL, Rfl: 0     OLANZapine (ZYPREXA) 10 MG tablet, 5 mg, Disp: , Rfl: 3     OLANZapine (ZYPREXA) 20 MG tablet, Take 20 mg by mouth At Bedtime, Disp: , Rfl:      ranitidine (ZANTAC) 150 MG tablet, Take 150 mg by mouth At Bedtime, Disp: , Rfl:      topiramate (TOPAMAX) 100 MG tablet, 200 mg At Bedtime, Disp: , Rfl: 3     traZODone (DESYREL) 50 MG tablet, Take 50 mg by mouth nightly as needed for sleep , Disp: 30 tablet, Rfl: 3    EXAM:   /86 (BP Location: Left arm, Patient Position: Sitting, Cuff Size: Adult Large)   Pulse 73   Wt 92.1 kg (203 lb)   SpO2 99%   BMI 35.96 kg/m    GENERAL APPEARANCE: alert, cooperative and not in distress  SKIN: no rashes, no lesions  HEAD: atraumatic, normocephalic  EYES: lids and lashes normal, conjunctivae and sclerae clear  ENT: no scars or lesions, nasal exam showed no discharge, swelling or lesions noted, tongue midline and normal, soft palate, uvula, and tonsils normal  NECK: no asymmetry, masses, or scars, supple without significant adenopathy  LUNGS: unlabored respirations, no intercostal retractions or accessory muscle use, clear to auscultation without rales or wheezes  HEART: regular rate and rhythm without murmurs and normal S1 and S2  MUSCULOSKELETAL: no musculoskeletal defects are noted  NEURO: no focal deficits noted  PSYCH: does not appear depressed or anxious      WORKUP:  None    ASSESSMENT/PLAN:  Maddy Ortiz is a 34 year old female here for follow-up of allergic rhinitis. She began allergen immunotherapy treatment several years ago but did not reach her maintenance dose until 7/2018. She feels that immunotherapy has been beneficial in treating her symptoms. She reports having occasional nasal congestion but no other significant rhinitis symptoms. She was counseled regarding  the risks, benefits, and recommended duration of immunotherapy treatment and wishes to continue at this time.    1. Continue allergen immunotherapy per protocol  2. Continue to carry epinephrine auto-injector until expiration - will not need to refill prescription or carry epinephrine auto-injector once current prescription expires  3. Use azelastine nasal spray once to twice daily as needed  4. Follow-up in 1 year      Thank you for allowing me to participate in the care of Maddy Ortiz.      Arleth Arizmendi MD  Allergy/Immunology  [facility]      Chart documentation done in part with Dragon Voice Recognition Software. Although reviewed after completion, some word and grammatical errors may remain.    Again, thank you for allowing me to participate in the care of your patient.        Sincerely,        Arleth Arizmendi MD

## 2020-03-09 NOTE — PATIENT INSTRUCTIONS
If you have any questions regarding your allergies, asthma, or what we discussed during your visit today please call the allergy clinic or contact us via [employer].    [facility] Allergy RN Line: [phone]  [facility] Scheduling Line: [phone]  [facility] Scheduling Line: [phone]      Continue getting your allergy shots in [place]    Bring your epinephrine auto-injector with you to each visit - once this expires in 1/2021 you will not need to refill this medication    Follow-up in  year

## 2020-03-09 NOTE — Clinical Note
FYI, patient will no longer need to carry epinephrine auto-injector once her current one expires in 1/2021. Follow-up visit due 3/2021.

## 2020-03-09 NOTE — PROGRESS NOTES
Maddy Ortiz was seen in the Allergy Clinic at [facility]. The following are my recommendations regarding her Allergic Rhinitis Due to Animals, Allergic Rhinitis Due to Pollen, Allergic Rhinitis Due to Dust Mites and Allergic Rhinitis Due to Mold    1. Continue allergen immunotherapy per protocol  2. Continue to carry epinephrine auto-injector until expiration - will not need to refill prescription or carry epinephrine auto-injector once current prescription expires  3. Use azelastine nasal spray once to twice daily as needed  4. Follow-up in 1 year      Maddy Ortiz is a 34 year old Pashto female who is seen today for follow-up of allergic rhinitis. She began allergen immunotherapy in 11/2016 and reached her maintenance dose in 7/2018. She denies any prior history of systemic or significant large local reactions. Maddy feels that immunotherapy has been helpful in managing her symptoms. On occasion she does have nasal congestion and will take Sudafed though this is not frequent. She is not taking antihistamines as they have caused side effects of increased appetite, sedation, and distraction. She uses azelastine nasal spray periodically and has not needed to use any eye drops. Maddy has not had any sinus infections or other significant respiratory illnesses in the past year.      Past Medical History:   Diagnosis Date     Bipolar 1 disorder (H) 12/6/2012     Depressive disorder      Paranoid type delusional disorder (H) 11/14/2012     Family History   Adopted: Yes   Problem Relation Age of Onset     Unknown/Adopted Mother      Unknown/Adopted Father      Unknown/Adopted Other      Social History     Tobacco Use     Smoking status: Never Smoker     Smokeless tobacco: Never Used   Substance Use Topics     Alcohol use: Not Currently     Comment: rare wine ( very rare)     Drug use: No       Past medical, family, and social history were reviewed.    REVIEW OF SYSTEMS:  General: negative for weight gain.  negative for weight loss. negative for changes in sleep.   Eyes: negative for itching. negative for redness. negative for tearing/watering. negative for vision changes  Ears: negative for fullness. negative for hearing loss. negative for dizziness.   Nose: negative for snoring.negative for changes in smell. negative for drainage.   Throat: negative for hoarseness. negative for sore throat. negative for trouble swallowing.   Lungs: negative for cough. negative for shortness of breath.negative for wheezing. negative for sputum production.   Cardiovascular: negative for chest pain. negative for swelling of ankles. negative for fast or irregular heartbeat.   Gastrointestinal: negative for nausea. negative for heartburn. negative for acid reflux.   Musculoskeletal: negative for joint pain. negative for joint stiffness. negative for joint swelling.   Neurologic: negative for seizures. negative for fainting. negative for weakness.   Psychiatric: negative for changes in mood. negative for anxiety.   Endocrine: negative for cold intolerance. negative for heat intolerance. negative for tremors.   Hematologic: negative for easy bruising. negative for easy bleeding.  Integumentary: negative for rash. negative for scaling. negative for nail changes.       Current Outpatient Medications:      cariprazine (VRAYLAR) 1.5 MG CAPS capsule, Take by mouth daily, Disp: , Rfl:      levonorgestrel (MIRENA) 20 MCG/24HR IUD, 1 each (20 mcg) by Intrauterine route continuous, Disp: , Rfl:      Melatonin 10 MG TABS tablet, Take 10 mg by mouth nightly as needed for sleep, Disp: , Rfl:      ORDER FOR ALLERGEN IMMUNOTHERAPY, Cat Hair, Standardized 10,000 BAU/mL, ALK  3.0 ml Dog Hair Dander, A. P.  1:100 w/v, HS  1.0 ml Dust Mites F 30,000AU/mL, HS  0.5 ml Dust Mites P. 30,000 AU/mL, HS  0.5 ml  Diluent: HSA qs to 5ml, Disp: 5 mL, Rfl: PRN     ORDER FOR ALLERGEN IMMUNOTHERAPY, Alternaria Tenuis 1:10 w/v, HS  0.5 ml Epicoccum Nigrum 1:10 w/v, HS 0.5  ml Hormodendrum Cladosporioides 1:10 w/v, HS 0.5 ml Diluent: HSA qs to 5ml, Disp: 5 mL, Rfl: PRN     ORDER FOR ALLERGEN IMMUNOTHERAPY, Estuardo, White  1:20 w/v, HS  0.5 ml Birch Mix PRW 1:20 w/v, HS  0.5 ml Elm, American 1:20 w/v, HS  0.5 ml Hackberry 1:20 w/v, HS 0.5 ml Hickory, Shagbark 1:20 w/v, HS  0.5 ml [place] Mix RW 1:20 w/v, HS 0.5 ml Oak Mix RVW 1:20 w/v, HS 0.5 ml [place] Tree, Black 1:20 w/v, HS 0.5 ml [place], Black 1:20 w/v, HS 0.5 ml Diluent: HSA qs to 5ml, Disp: 5 mL, Rfl: PRN     ORDER FOR ALLERGEN IMMUNOTHERAPY, Kochia 1:20 w/v, HS 1.0 ml Nettle 1:20 w/v, HS 1.0 ml Plantain, English 1:20 w/v, HS 1.0 ml Ragweed Mixed 1:20 w/v ALK  0.6 ml Russian Thistle 1:20 w/v, HS 1.0 ml Diluent: HSA qs to 5ml, Disp: 5 mL, Rfl: PRN     adapalene (DIFFERIN) 0.1 % external cream, Apply topically At Bedtime (Patient not taking: Reported on 3/9/2020), Disp: 45 g, Rfl: 3     ARIPiprazole (ABILIFY) 10 MG tablet, Take 5 mg by mouth daily, Disp: , Rfl: 1     azelastine (ASTELIN) 0.1 % nasal spray, Spray 2 sprays into both nostrils 2 times daily (Patient not taking: Reported on 10/11/2019), Disp: 1 Bottle, Rfl: 5     cetirizine (ZYRTEC) 10 MG tablet, Take 10 mg by mouth 2 times daily as needed for allergies, Disp: , Rfl:      ClonazePAM (KLONOPIN PO), Take 1 mg by mouth daily as needed for anxiety , Disp: , Rfl:      EPINEPHrine (EPIPEN/ADRENACLICK/OR ANY BX GENERIC EQUIV) 0.3 MG/0.3ML injection 2-pack, Inject 0.3 mLs (0.3 mg) into the muscle as needed for anaphylaxis (Patient not taking: Reported on 3/9/2020), Disp: 0.6 mL, Rfl: 3     LATUDA 40 MG TABS tablet, 40 mg 3 tablets at night, Disp: , Rfl: 3     LATUDA 80 MG TABS tablet, 80 mg 2 times daily , Disp: , Rfl:      neomycin-polymyxin-hydrocortisone (CORTISPORIN) 3.5-50356-4 otic solution, Place 3 drops into both ears 4 times daily For one week (Patient not taking: Reported on 11/1/2019), Disp: 10 mL, Rfl: 0     OLANZapine (ZYPREXA) 10 MG tablet, 5 mg, Disp: , Rfl: 3      OLANZapine (ZYPREXA) 20 MG tablet, Take 20 mg by mouth At Bedtime, Disp: , Rfl:      ranitidine (ZANTAC) 150 MG tablet, Take 150 mg by mouth At Bedtime, Disp: , Rfl:      topiramate (TOPAMAX) 100 MG tablet, 200 mg At Bedtime, Disp: , Rfl: 3     traZODone (DESYREL) 50 MG tablet, Take 50 mg by mouth nightly as needed for sleep , Disp: 30 tablet, Rfl: 3    EXAM:   /86 (BP Location: Left arm, Patient Position: Sitting, Cuff Size: Adult Large)   Pulse 73   Wt 92.1 kg (203 lb)   SpO2 99%   BMI 35.96 kg/m    GENERAL APPEARANCE: alert, cooperative and not in distress  SKIN: no rashes, no lesions  HEAD: atraumatic, normocephalic  EYES: lids and lashes normal, conjunctivae and sclerae clear  ENT: no scars or lesions, nasal exam showed no discharge, swelling or lesions noted, tongue midline and normal, soft palate, uvula, and tonsils normal  NECK: no asymmetry, masses, or scars, supple without significant adenopathy  LUNGS: unlabored respirations, no intercostal retractions or accessory muscle use, clear to auscultation without rales or wheezes  HEART: regular rate and rhythm without murmurs and normal S1 and S2  MUSCULOSKELETAL: no musculoskeletal defects are noted  NEURO: no focal deficits noted  PSYCH: does not appear depressed or anxious      WORKUP:  None    ASSESSMENT/PLAN:  Maddy Ortiz is a 34 year old female here for follow-up of allergic rhinitis. She began allergen immunotherapy treatment several years ago but did not reach her maintenance dose until 7/2018. She feels that immunotherapy has been beneficial in treating her symptoms. She reports having occasional nasal congestion but no other significant rhinitis symptoms. She was counseled regarding the risks, benefits, and recommended duration of immunotherapy treatment and wishes to continue at this time.    1. Continue allergen immunotherapy per protocol  2. Continue to carry epinephrine auto-injector until expiration - will not need to refill  prescription or carry epinephrine auto-injector once current prescription expires  3. Use azelastine nasal spray once to twice daily as needed  4. Follow-up in 1 year      Thank you for allowing me to participate in the care of Maddy Ortiz.      Arleth Arizmendi MD  Allergy/Immunology  [facility]      Chart documentation done in part with Dragon Voice Recognition Software. Although reviewed after completion, some word and grammatical errors may remain.

## 2020-03-10 ENCOUNTER — TRANSFERRED RECORDS (OUTPATIENT)
Dept: HEALTH INFORMATION MANAGEMENT | Facility: CLINIC | Age: 35
End: 2020-03-10

## 2020-03-17 ENCOUNTER — OFFICE VISIT (OUTPATIENT)
Dept: FAMILY MEDICINE | Facility: CLINIC | Age: 35
End: 2020-03-17
Payer: COMMERCIAL

## 2020-03-17 VITALS
DIASTOLIC BLOOD PRESSURE: 74 MMHG | HEART RATE: 100 BPM | TEMPERATURE: 98 F | RESPIRATION RATE: 20 BRPM | WEIGHT: 200 LBS | OXYGEN SATURATION: 97 % | SYSTOLIC BLOOD PRESSURE: 122 MMHG | BODY MASS INDEX: 33.32 KG/M2 | HEIGHT: 65 IN

## 2020-03-17 DIAGNOSIS — Z30.09 GENERAL COUNSELING FOR PRESCRIPTION OF ORAL CONTRACEPTIVES: Primary | ICD-10-CM

## 2020-03-17 PROCEDURE — 99212 OFFICE O/P EST SF 10 MIN: CPT | Performed by: FAMILY MEDICINE

## 2020-03-17 RX ORDER — ESCITALOPRAM OXALATE 20 MG/1
20 TABLET ORAL DAILY
COMMUNITY
End: 2020-08-13

## 2020-03-17 RX ORDER — CARIPRAZINE 4.5 MG/1
4.5 CAPSULE, GELATIN COATED ORAL DAILY
COMMUNITY
Start: 2020-02-08 | End: 2021-04-26

## 2020-03-17 ASSESSMENT — MIFFLIN-ST. JEOR: SCORE: 1603.07

## 2020-03-17 NOTE — PATIENT INSTRUCTIONS
Mirena IUD was placed 9/15/2017 so is not due for removal until 9/2022.   This may be just one period and then you go back to minimal periods, or you may start to get periods again.  I would recommend that we wait another few months to see what direction periods do go.    Certainly if periods become heavier or more often then plan replacement in the fall or next year.

## 2020-03-17 NOTE — PROGRESS NOTES
"Subjective     Maddy Ortiz is a 34 year old female who presents to clinic today for the following health issues:    HPI   Chief Complaint   Patient presents with     Contraception     IUD removal and replacement     Mirena was replaced 9/15/2017 see note from Dr. Keller.  In the past just spotting with IUD and then this month did have a full period. Not very crampy or bothersome. Thinking the IUD needs replacing.      BP Readings from Last 3 Encounters:   03/17/20 122/74   03/09/20 131/86   10/11/19 124/88    Wt Readings from Last 3 Encounters:   03/17/20 90.7 kg (200 lb)   03/09/20 92.1 kg (203 lb)   11/12/19 91.2 kg (201 lb)                 Reviewed and updated as needed this visit by Provider         Review of Systems   ROS COMP: Constitutional, HEENT, cardiovascular, pulmonary, gi and gu systems are negative, except as otherwise noted.      Objective    /74   Pulse 100   Temp 98  F (36.7  C) (Tympanic)   Resp 20   Ht 1.651 m (5' 5\")   Wt 90.7 kg (200 lb)   SpO2 97%   BMI 33.28 kg/m    Body mass index is 33.28 kg/m .  Physical Exam   GENERAL: healthy, alert and no distress  PSYCH: mentation appears normal and affect normal/bright    Diagnostic Test Results:  Labs reviewed in Epic        Assessment & Plan     Maddy was seen today for contraception.    Diagnoses and all orders for this visit:    General counseling for prescription of oral contraceptives: we discussed that the Mirena IUD is not due until 9/2022 and that one period doesn't mean it is needing replacement.  Discussed options today and we decided to watchful waiting for few months.  If heavier and more frequent periods then we can replace.  Will be due for pap in March 2021 so can also discuss at that time.         BMI:   Estimated body mass index is 33.28 kg/m  as calculated from the following:    Height as of this encounter: 1.651 m (5' 5\").    Weight as of this encounter: 90.7 kg (200 lb).   Weight management plan: Discussed healthy " diet and exercise guidelines        Patient Instructions   Mirena IUD was placed 9/15/2017 so is not due for removal until 9/2022.   This may be just one period and then you go back to minimal periods, or you may start to get periods again.  I would recommend that we wait another few months to see what direction periods do go.    Certainly if periods become heavier or more often then plan replacement in the fall or next year.      Return in about 1 year (around 3/17/2021).    David Vivar MD  [facility]

## 2020-03-30 ENCOUNTER — TRANSFERRED RECORDS (OUTPATIENT)
Dept: HEALTH INFORMATION MANAGEMENT | Facility: CLINIC | Age: 35
End: 2020-03-30

## 2020-04-11 ENCOUNTER — NURSE TRIAGE (OUTPATIENT)
Dept: NURSING | Facility: CLINIC | Age: 35
End: 2020-04-11

## 2020-04-11 NOTE — TELEPHONE ENCOUNTER
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 I am looking for an appointment, maybe next week. I need to schedule transportation.\"   SX: \"I feel aroused all the time, it is difficult for me to sleep. It is affecting my judgment and decision making, wanting to relieve my sx.\" She states she has both vaginal sx and sexual excitement that is not appropriate when she is stressed.   After taking medication (Braylar) she notices it more often. She has also been more aware of sensations since she has been meditating.     Message will be forwarded to PCP. Patient is requesting a referral to an appropriate specialist for this problem. Please contact patient with this information.     Reason for Disposition    All other vaginal symptoms  (Exception: feels like prior yeast infection, minor abrasion, mild rash < 24 hour duration, mild itching)    Additional Information    Negative: Followed a genital area injury    Negative: Foreign body in vagina (e.g., tampon)    Negative: Vaginal bleeding is main symptom    Negative: Vaginal discharge is main symptom    Negative: Pain or burning with urination is main symptom    Negative: Menstrual cramps is main symptom    Negative: Abdomen pain is main symptom    Negative: Pubic lice suspected    Negative: Itching or rash of external female genital area (vulva)    Negative: Patient sounds very sick or weak to the triager    Negative: [1] SEVERE pain AND [2] not improved 2 hours after pain medicine    Negative: [1] Genital area looks infected (e.g., draining sore, spreading redness) AND [2] fever    Negative: [1] Something is hanging out of the vagina AND [2] can't easily be pushed back inside    Negative: MODERATE-SEVERE itching (i.e., interferes with school, work, or sleep)    Negative: Genital area looks infected (e.g., draining sore, spreading redness)    Negative: Rash with painful tiny water " "blisters    Negative: [1] Rash (e.g., redness, tiny bumps, sore) of genital area AND [2] present > 24 hours    Negative: Tender lump (swelling or \"ball\") at vaginal opening    Negative: [1] Symptoms of a yeast infection (i.e., itchy, white discharge, not bad smelling) AND    [2] not improved > 3 days following CARE ADVICE    Negative: [1] Vaginal itching AND [2] not improved > 3 days following CARE ADVICE    Negative: Patient is worried about sexually transmitted disease (STD)    Negative: Feels like something inside is falling out of vagina (e.g., pressure, heaviness, fullness)    Negative: [1] Vaginal dryness or itching AND [2] nearing menopause or after menopause    Negative: Pain with sexual intercourse (dyspareunia)  (Exception: feels like prior yeast infection, minor abrasion, minor rash < 24 hour duration, mild itching)    Negative: Pain in genital area is a chronic symptom (recurrent or ongoing AND present > 4 weeks)    Protocols used: VAGINAL SYMPTOMS-A-AH      "

## 2020-04-13 NOTE — TELEPHONE ENCOUNTER
Patient notified of recommendation from PcP.  Patient verbalized understanding.    Amparo HERNANDEZ Rn

## 2020-04-15 ENCOUNTER — TRANSFERRED RECORDS (OUTPATIENT)
Dept: HEALTH INFORMATION MANAGEMENT | Facility: CLINIC | Age: 35
End: 2020-04-15

## 2020-05-05 ENCOUNTER — TELEPHONE (OUTPATIENT)
Dept: ALLERGY | Facility: CLINIC | Age: 35
End: 2020-05-05

## 2020-05-05 NOTE — TELEPHONE ENCOUNTER
Patient last received Red 0.5 ml's for all shots on 3/3/2020.  Please indicate if patient is a priority restart for allergy shots.  If so, please provide new dosing orders.  IF not, please disregard.  Shirley Grant RN

## 2020-05-11 ENCOUNTER — TRANSFERRED RECORDS (OUTPATIENT)
Dept: HEALTH INFORMATION MANAGEMENT | Facility: CLINIC | Age: 35
End: 2020-05-11

## 2020-05-15 ENCOUNTER — TELEPHONE (OUTPATIENT)
Dept: ALLERGY | Facility: CLINIC | Age: 35
End: 2020-05-15

## 2020-05-15 NOTE — TELEPHONE ENCOUNTER
Reason for Call:  Other appointment    Detailed comments: Pt calling to get her allergy shots rescheduled.    Phone Number Patient can be reached at: Home number on file [phone] (home)    Best Time:     Can we leave a detailed message on this number? YES    Call taken on 5/15/2020 at 2:53 PM by Dena Dominguez

## 2020-05-18 ENCOUNTER — TELEPHONE (OUTPATIENT)
Dept: ALLERGY | Facility: CLINIC | Age: 35
End: 2020-05-18

## 2020-05-18 NOTE — TELEPHONE ENCOUNTER
Reason for Call:  Other appointment    Detailed comments: pt calling stating she needs to r/s her allergy shot appt.     Phone Number Patient can be reached at: Home number on file [phone] (home)    Best Time: any     Can we leave a detailed message on this number? YES    Call taken on 5/18/2020 at 12:10 PM by Shellie Chowdhury

## 2020-05-18 NOTE — TELEPHONE ENCOUNTER
[id number] [facility] scheduled to be delivered 5.19.2020 Huddled with Katlyn.  There is nothing else we can do at this time. Will close encounter.    Juan Aldridge, RN, BSN

## 2020-05-22 ENCOUNTER — ALLIED HEALTH/NURSE VISIT (OUTPATIENT)
Dept: ALLERGY | Facility: CLINIC | Age: 35
End: 2020-05-22
Payer: COMMERCIAL

## 2020-05-22 DIAGNOSIS — J30.9 ALLERGIC RHINITIS: ICD-10-CM

## 2020-05-22 DIAGNOSIS — Z51.6 NEED FOR DESENSITIZATION TO ALLERGENS: Primary | ICD-10-CM

## 2020-05-22 PROCEDURE — 99207 ZZC DROP WITH A PROCEDURE: CPT

## 2020-05-22 PROCEDURE — 95117 IMMUNOTHERAPY INJECTIONS: CPT

## 2020-05-25 ENCOUNTER — NURSE TRIAGE (OUTPATIENT)
Dept: NURSING | Facility: CLINIC | Age: 35
End: 2020-05-25

## 2020-05-25 NOTE — TELEPHONE ENCOUNTER
Maddy is calling about concerns about her health/facebook and a worker that works at [place] [employer].  FNA advised to  on Tuesday, May 26th and patient agreed.      Additional Information    Negative: Nursing judgment, per information in Reference    Negative: Information only call about a Well Adult (no illness or injury)    Negative: Nursing judgment or information in reference    Negative: Nursing judgment or information in reference    Negative: Nursing judgment or information in reference    Negative: Nursing judgment or information in reference    Negative: Nursing judgment or information in reference    Negative: Nursing judgment or information in reference    Negative: Nursing judgment or information in reference    Negative: Nursing judgment or information in reference    Negative: Nursing judgment or information in reference    Negative: Nursing judgment or information in reference    Negative: Nursing judgment or information in reference    Negative: Nursing judgment or information in reference    Negative: Nursing judgment or information in reference    Nursing judgment or information in reference    Protocols used: NO GUIDELINE [id number]

## 2020-05-26 ENCOUNTER — TELEPHONE (OUTPATIENT)
Dept: ALLERGY | Facility: CLINIC | Age: 35
End: 2020-05-26

## 2020-05-26 DIAGNOSIS — J30.1 CHRONIC SEASONAL ALLERGIC RHINITIS DUE TO POLLEN: ICD-10-CM

## 2020-05-26 DIAGNOSIS — J30.89 ALLERGIC RHINITIS DUE TO MOLD: ICD-10-CM

## 2020-05-26 DIAGNOSIS — J30.81 ALLERGIC RHINITIS DUE TO ANIMAL DANDER: ICD-10-CM

## 2020-05-26 DIAGNOSIS — J30.89 ALLERGIC RHINITIS DUE TO DUST MITE: ICD-10-CM

## 2020-05-26 NOTE — TELEPHONE ENCOUNTER
Next appointment scheduled for 6/19/20. Patient will be dropped to 0.1mL of red vial if receiving injection by that date.

## 2020-05-26 NOTE — TELEPHONE ENCOUNTER
ALLERGY SOLUTION RE-ORDER REQUEST    Maddy Ortiz 1985 MRN: [id number]      DATE NEEDED:  06/09/2020  Vial Color Content   Top Dose   Last Dose Vial Size  Red 1:1 Cat, Dog, Dust Mite   Red 1:1 0.5   Red 1:10.2 5 mL  Red 1:1 Molds   Red 1:1 0.5   Red 1:10.2 5 mL  Red 1:1 Trees   Red 1:1 0.5   Red 1:10.2 5 mL  Red 1:1 Weeds  Red 1:1 0.5   Red 1:10.2 5 mL        Serum reorder consent signed and patient/parent was advised that new serums would be ordered through the pharmacy and billed to their insurance company when they arrive in clinic. Yes    Shot Clinic Location:  [place]  Ship to Location: [place]  Serum billed to:  [place]    Special Instructions:  Pt switching from Dr Arizmendi to Dr Shin      Updated Prescription Needed: Yes    Requester Signature  Kenney Pena LPN

## 2020-05-26 NOTE — TELEPHONE ENCOUNTER
New allergy serum has been ordered for patient. Please advise new vial start dose.    Top Dose: Red 0.5  Last injection given on 5/22/2020.       Vial Color Content  Dose   Red 1:1 Cat, Dog, Dust Mite  0.2     Red 1:1 Molds  0.2     Red 1:1 Trees  0.2     Red 1:1 Weeds  0.2       Vials have not been ordered yet because we need to know if pt will be dropped back to Yellow vials.  Do we order yellow vials?    Signature  Kenney Pena LPN

## 2020-05-27 ENCOUNTER — TRANSFERRED RECORDS (OUTPATIENT)
Dept: HEALTH INFORMATION MANAGEMENT | Facility: CLINIC | Age: 35
End: 2020-05-27

## 2020-05-29 ENCOUNTER — OFFICE VISIT (OUTPATIENT)
Dept: ALLERGY | Facility: CLINIC | Age: 35
End: 2020-05-29
Payer: COMMERCIAL

## 2020-05-29 VITALS
DIASTOLIC BLOOD PRESSURE: 84 MMHG | SYSTOLIC BLOOD PRESSURE: 112 MMHG | HEART RATE: 108 BPM | WEIGHT: 212.3 LBS | TEMPERATURE: 97.7 F | OXYGEN SATURATION: 95 % | BODY MASS INDEX: 35.33 KG/M2

## 2020-05-29 DIAGNOSIS — J30.89 ALLERGIC RHINITIS DUE TO DUST MITE: ICD-10-CM

## 2020-05-29 DIAGNOSIS — J30.81 ALLERGIC RHINITIS DUE TO ANIMAL DANDER: ICD-10-CM

## 2020-05-29 DIAGNOSIS — J30.1 SEASONAL ALLERGIC RHINITIS DUE TO POLLEN: ICD-10-CM

## 2020-05-29 DIAGNOSIS — J30.89 ALLERGIC RHINITIS DUE TO MOLD: ICD-10-CM

## 2020-05-29 DIAGNOSIS — J30.1 CHRONIC SEASONAL ALLERGIC RHINITIS DUE TO POLLEN: Primary | ICD-10-CM

## 2020-05-29 PROCEDURE — 99213 OFFICE O/P EST LOW 20 MIN: CPT | Performed by: ALLERGY & IMMUNOLOGY

## 2020-05-29 RX ORDER — AZELASTINE 1 MG/ML
2 SPRAY, METERED NASAL 2 TIMES DAILY PRN
Qty: 1 BOTTLE | Refills: 5 | Status: SHIPPED | OUTPATIENT
Start: 2020-05-29 | End: 2020-08-13

## 2020-05-29 RX ORDER — BENZTROPINE MESYLATE 0.5 MG/1
0.5 TABLET ORAL DAILY PRN
Status: ON HOLD | COMMUNITY
Start: 2020-05-11 | End: 2022-01-11

## 2020-05-29 RX ORDER — HYDROXYZINE HYDROCHLORIDE 10 MG/1
10 TABLET, FILM COATED ORAL DAILY PRN
Qty: 90 TABLET | Refills: 1 | Status: SHIPPED | OUTPATIENT
Start: 2020-05-29 | End: 2021-10-21

## 2020-05-29 RX ORDER — MOMETASONE FUROATE MONOHYDRATE 50 UG/1
2 SPRAY, METERED NASAL DAILY
Qty: 17 G | Refills: 6 | Status: SHIPPED | OUTPATIENT
Start: 2020-05-29 | End: 2021-03-19

## 2020-05-29 RX ORDER — HYDROXYZINE HYDROCHLORIDE 10 MG/1
TABLET, FILM COATED ORAL PRN
COMMUNITY
Start: 2020-05-11 | End: 2020-10-26

## 2020-05-29 ASSESSMENT — ENCOUNTER SYMPTOMS
ADENOPATHY: 0
VOMITING: 0
EYE ITCHING: 0
FEVER: 0
CHILLS: 0
DIARRHEA: 1
RHINORRHEA: 0
COUGH: 0
JOINT SWELLING: 0
EYE DISCHARGE: 0
SINUS PRESSURE: 0
MYALGIAS: 0
SHORTNESS OF BREATH: 0
WHEEZING: 0
NAUSEA: 0
HEADACHES: 1
CHEST TIGHTNESS: 0
SINUS PAIN: 1
ARTHRALGIAS: 0
EYE REDNESS: 0
FACIAL SWELLING: 0
ACTIVITY CHANGE: 0

## 2020-05-29 NOTE — PATIENT INSTRUCTIONS
Continue allergy shots as is.  You can increase azelastine up to 2 sprays in each nostril twice daily if needed.  If you still have nasal symptoms, start Nasonex 2 sprays in each nostril once daily, and use it until your symptoms get better.     Try not to take Sudafed. Take hydroxyzine  10 mg by mouth once daily as needed for nasal symptoms.

## 2020-05-29 NOTE — PROGRESS NOTES
SUBJECTIVE:                                                               Maddy Ortiz presents today to our Allergy Clinic at [facility] for a follow up visit.  As you know, she is a 34 year old female with allergic rhinitis. Percutaneous skin puncture testing for aeroallergens performed in November 2016 showed sensitivity to dog, cat, dust mite, molds, pollen of trees, grass, and weeds. In spring-summer 2018, she had a buildup using cluster schedule for allergen immunotherapy.  She reached maintenance dose in July 2018.     Allergy Immunotherapy  Date/time of injection(s): 5/22/2020  Vial Color                               Content                                  Dose  Red 1:1                                   Cat, Dog, Dust Mite                0.2mL  Red 1:1                                   Molds                                       0.2mL    Red 1:1                                   Trees                                       0.2mL  Red 1:1                                   Weeds                                     0.2mL     She tolerates injections well without persistent large local reactions or systemic reactions.  The patient is satisfied with allergen immunotherapy. She states that her symptoms significantly improved since she started it. She cannot tell exactly by how much, but she thinks that her symptoms improved by more than a half. She uses azelastine 1 spray in each nostril once daily. She believes it helps her. She does not take oral antihistamines and does not use nasal steroids.  In the past, she was taken cetirizine. She noticed that if she uses hydroxyzine, it helps more. It seems to improve several things at the same time, her rhinoconjunctivitis symptoms, anxiety, and occasional pruritus of the skin. Maddy wonders if I can prescribe her hydroxyzine.  She may develop clear rhinorrhea, nasal itch, stuffiness, and sneezing several times a week. The symptoms are mild. If she  takes hydroxyzine, Sudafed, and uses azelastine, her symptoms are improving within an hour.  Patient Active Problem List   Diagnosis     Animal dander allergy     CARDIOVASCULAR SCREENING; LDL GOAL LESS THAN 160     Psychosis (H)     Paranoid type delusional disorder (H)     Bipolar 1 disorder (H)     Insomnia     Depression with anxiety     Seasonal allergic rhinitis due to pollen     Allergic rhinitis due to mold     Allergic rhinitis due to animal dander     Allergic rhinitis due to dust mite     Encounter for IUD insertion     Schizoaffective disorder, bipolar type (H)     Gastroesophageal reflux disease without esophagitis     Paranoia (psychosis) (H)     Obesity (BMI 35.0-39.9) with comorbidity (H)       Past Medical History:   Diagnosis Date     Bipolar 1 disorder (H) 12/6/2012     Depressive disorder      Paranoid type delusional disorder (H) 11/14/2012      *Patient is Adopted       Problem (# of Occurrences) Relation (Name,Age of Onset)    Unknown/Adopted (3) Mother, Father, Other        Past Surgical History:   Procedure Laterality Date     TONSILLECTOMY & ADENOIDECTOMY      as a child     Social History     Socioeconomic History     Marital status: Single     Spouse name: None     Number of children: None     Years of education: None     Highest education level: None   Occupational History     None   Social Needs     Financial resource strain: None     Food insecurity     Worry: None     Inability: None     Transportation needs     Medical: None     Non-medical: None   Tobacco Use     Smoking status: Never Smoker     Smokeless tobacco: Never Used     Tobacco comment: around 2nd hand smoke   Substance and Sexual Activity     Alcohol use: Not Currently     Comment: rare wine ( very rare)     Drug use: No     Sexual activity: Not Currently     Partners: Male     Birth control/protection: I.U.D.   Lifestyle     Physical activity     Days per week: None     Minutes per session: None     Stress: None    Relationships     Social connections     Talks on phone: None     Gets together: None     Attends Taoist service: None     Active member of club or organization: None     Attends meetings of clubs or organizations: None     Relationship status: None     Intimate partner violence     Fear of current or ex partner: None     Emotionally abused: None     Physically abused: None     Forced sexual activity: None   Other Topics Concern     Parent/sibling w/ CABG, MI or angioplasty before 65F 55M? No     Comment: patient was adopted; unknown family history   Social History Narrative    One child    Education: some college        May 29, 2020    ENVIRONMENTAL HISTORY: [place] The home is heated with a electric furnace. They do not have central air conditioning, does have box air conditioner in the wall and has air purifier. The patient's bedroom is furnished with stuffed animals in bed, carpeting in bedroom and fabric window coverings. No pets. There is history of cockroach or mice infestation. There are no smokers in the house.  The apartment does not have a basement.            Review of Systems   Constitutional: Negative for activity change, chills and fever.   HENT: Positive for congestion, postnasal drip and sinus pain (occasionally in the morning). Negative for dental problem, ear pain, facial swelling, nosebleeds, rhinorrhea, sinus pressure and sneezing.    Eyes: Negative for discharge, redness and itching.   Respiratory: Negative for cough, chest tightness, shortness of breath and wheezing.    Cardiovascular: Negative for chest pain.   Gastrointestinal: Positive for diarrhea. Negative for nausea and vomiting.   Musculoskeletal: Negative for arthralgias, joint swelling and myalgias.   Skin: Negative for rash.   Allergic/Immunologic: Positive for environmental allergies.   Neurological: Positive for headaches.   Hematological: Negative for adenopathy.    Psychiatric/Behavioral: Positive for behavioral problems (started day treatment). Negative for self-injury.           Current Outpatient Medications:      azelastine (ASTELIN) 0.1 % nasal spray, Spray 2 sprays into both nostrils 2 times daily as needed for rhinitis, Disp: 1 Bottle, Rfl: 5     benztropine (COGENTIN) 0.5 MG tablet, as needed, Disp: , Rfl:      hydrOXYzine (ATARAX) 10 MG tablet, as needed, Disp: , Rfl:      hydrOXYzine (ATARAX) 10 MG tablet, Take 1 tablet (10 mg) by mouth daily as needed for itching (nasal al,lergy symptoms), Disp: 90 tablet, Rfl: 1     levonorgestrel (MIRENA) 20 MCG/24HR IUD, 1 each (20 mcg) by Intrauterine route continuous, Disp: , Rfl:      Melatonin 10 MG TABS tablet, Take 10 mg by mouth nightly as needed for sleep, Disp: , Rfl:      mometasone (NASONEX) 50 MCG/ACT nasal spray, Spray 2 sprays into both nostrils daily, Disp: 17 g, Rfl: 6     ORDER FOR ALLERGEN IMMUNOTHERAPY, Cat Hair, Standardized 10,000 BAU/mL, ALK  3.0 ml Dog Hair Dander, A. P.  1:100 w/v, HS  1.0 ml Dust Mites F 30,000AU/mL, HS  0.5 ml Dust Mites P. 30,000 AU/mL, HS  0.5 ml  Diluent: HSA qs to 5ml, Disp: 5 mL, Rfl: PRN     ORDER FOR ALLERGEN IMMUNOTHERAPY, Alternaria Tenuis 1:10 w/v, HS  0.5 ml Epicoccum Nigrum 1:10 w/v, HS 0.5 ml Hormodendrum Cladosporioides 1:10 w/v, HS 0.5 ml Diluent: HSA qs to 5ml, Disp: 5 mL, Rfl: PRN     ORDER FOR ALLERGEN IMMUNOTHERAPY, Estuardo, White  1:20 w/v, HS  0.5 ml Birch Mix PRW 1:20 w/v, HS  0.5 ml Elm, American 1:20 w/v, HS  0.5 ml Hackberry 1:20 w/v, HS 0.5 ml Hickory, Shagbark 1:20 w/v, HS  0.5 ml Duke Mix RW 1:20 w/v, HS 0.5 ml Oak Mix RVW 1:20 w/v, HS 0.5 ml [place] Tree, Black 1:20 w/v, HS 0.5 ml [place], Black 1:20 w/v, HS 0.5 ml Diluent: HSA qs to 5ml, Disp: 5 mL, Rfl: PRN     ORDER FOR ALLERGEN IMMUNOTHERAPY, Kochia 1:20 w/v, HS 1.0 ml Nettle 1:20 w/v, HS 1.0 ml Plantain, English 1:20 w/v, HS 1.0 ml Ragweed Mixed 1:20 w/v ALK  0.6 ml Russian Thistle 1:20 w/v, HS 1.0 ml  Diluent: HSA qs to 5ml, Disp: 5 mL, Rfl: PRN     sertraline (ZOLOFT) 50 MG tablet, daily, Disp: , Rfl:      VRAYLAR 4.5 MG CAPS capsule, TK 1 C PO D, Disp: , Rfl:      adapalene (DIFFERIN) 0.1 % external cream, Apply topically At Bedtime (Patient not taking: Reported on 5/29/2020), Disp: 45 g, Rfl: 3     ARIPiprazole (ABILIFY) 10 MG tablet, Take 5 mg by mouth daily, Disp: , Rfl: 1     ClonazePAM (KLONOPIN PO), Take 1 mg by mouth daily as needed for anxiety , Disp: , Rfl:      EPINEPHrine (EPIPEN/ADRENACLICK/OR ANY BX GENERIC EQUIV) 0.3 MG/0.3ML injection 2-pack, Inject 0.3 mLs (0.3 mg) into the muscle as needed for anaphylaxis (Patient not taking: Reported on 3/17/2020), Disp: 0.6 mL, Rfl: 3     escitalopram (LEXAPRO) 20 MG tablet, Take 20 mg by mouth daily, Disp: , Rfl:      LATUDA 40 MG TABS tablet, 40 mg 3 tablets at night, Disp: , Rfl: 3     LATUDA 80 MG TABS tablet, 80 mg 2 times daily , Disp: , Rfl:      OLANZapine (ZYPREXA) 10 MG tablet, 5 mg, Disp: , Rfl: 3     OLANZapine (ZYPREXA) 20 MG tablet, Take 20 mg by mouth At Bedtime, Disp: , Rfl:      Pseudoephedrine HCl (SUDAFED PO), Take by mouth as needed for congestion, Disp: , Rfl:      ranitidine (ZANTAC) 150 MG tablet, Take 150 mg by mouth At Bedtime, Disp: , Rfl:      traZODone (DESYREL) 50 MG tablet, Take 50 mg by mouth nightly as needed for sleep , Disp: 30 tablet, Rfl: 3  Immunization History   Administered Date(s) Administered     DTAP (<7y) 03/01/1985, 06/01/1985, 07/01/1985, 07/01/1986, 07/26/1990     HPV Quadrivalent 12/12/2008, 09/06/2011     Hep B, Peds or Adolescent 02/16/1998     Historical DTP/aP 03/01/1985, 06/01/1985, 07/01/1985, 07/01/1986, 07/26/1990     Historical Hepb 02/16/1998     Influenza (IIV3) PF 12/09/2008, 12/06/2012     Influenza Vaccine IM > 6 months Valent IIV4 11/21/2017, 01/03/2019, 09/17/2019     MMR 02/27/1986, 07/17/1997     Poliovirus, inactivated (IPV) 06/01/1985, 07/01/1985, 01/01/1987, 07/26/1990     TDAP Vaccine  (Adacel) 06/30/1997, 03/31/2008, 05/14/2010, 12/06/2012     Td (Adult), Adsorbed 06/30/1997     Allergies   Allergen Reactions     Animal Dander      Other reaction(s): *Unknown     Metformin Other (See Comments)     fatigue     Mold      Trees      OBJECTIVE:                                                                 /84 (BP Location: Left arm, Patient Position: Sitting, Cuff Size: Adult Large)   Pulse 108   Temp 97.7  F (36.5  C) (Tympanic)   Wt 96.3 kg (212 lb 4.9 oz)   SpO2 95%   BMI 35.33 kg/m          Physical Exam  Vitals signs and nursing note reviewed.   Constitutional:       General: She is not in acute distress.     Appearance: She is not diaphoretic.   HENT:      Head: Normocephalic and atraumatic.      Right Ear: Tympanic membrane, ear canal and external ear normal.      Left Ear: Tympanic membrane, ear canal and external ear normal.      Nose: Septal deviation (mild, to the right) present. No mucosal edema or rhinorrhea.   Eyes:      General:         Right eye: No discharge.         Left eye: No discharge.      Conjunctiva/sclera: Conjunctivae normal.   Neck:      Musculoskeletal: Normal range of motion.   Cardiovascular:      Rate and Rhythm: Normal rate and regular rhythm.      Heart sounds: Normal heart sounds. No murmur.   Pulmonary:      Effort: Pulmonary effort is normal. No respiratory distress.      Breath sounds: Normal breath sounds. No wheezing or rales.   Musculoskeletal: Normal range of motion.   Lymphadenopathy:      Cervical: No cervical adenopathy.   Skin:     General: Skin is warm.      Findings: No rash.   Neurological:      Mental Status: She is alert and oriented to person, place, and time.             ASSESSMENT/PLAN:    1. Chronic seasonal allergic rhinitis due to pollen 2. Allergic rhinitis due to mold 3. Allergic rhinitis due to dust mite 4. Allergic rhinitis due to animal dander 5. Seasonal allergic rhinitis due to pollen  Symptoms seem to be well  controlled.  -Continue allergen immunotherapy.  It seems to be effective and the patient is satisfied with the results.  Anticipate treatment until 2023.  - If needed, she can increase azelastine up to 2 sprays in each nostril twice daily.  - She can take hydroxyzine 10 mg by mouth once daily if needed.  She prefers hydroxyzine.  It also controls her anxiety and occasional pruritus of the skin.  - Advised against using oral decongestants frequently.  If she starts having persistent nasal symptoms, I recommend using an intranasal steroid, like mometasone 2 sprays in each nostril once daily.     - hydrOXYzine (ATARAX) 10 MG tablet  Dispense: 90 tablet; Refill: 1  - mometasone (NASONEX) 50 MCG/ACT nasal spray  Dispense: 17 g; Refill: 6  - azelastine (ASTELIN) 0.1 % nasal spray  Dispense: 1 Bottle; Refill: 5      Return in about 1 year (around 5/29/2021), or if symptoms worsen or fail to improve.    Thank you for allowing us to participate in the care of this patient. Please feel free to contact us if there are any questions or concerns about the patient.    Disclaimer: This note consists of symbols derived from keyboarding, dictation and/or voice recognition software. As a result, there may be errors in the script that have gone undetected. Please consider this when interpreting information found in this chart.    Rudy Shin MD, FAAAAI, FACAAI  Allergy, Asthma and Immunology  [facility]

## 2020-05-29 NOTE — LETTER
5/29/2020         RE: Maddy Ortiz  [address]  [facility] [address]        Dear Colleague,    Thank you for referring your patient, Maddy Ortiz, to the [facility]. Please see a copy of my visit note below.    SUBJECTIVE:                                                               Maddy Ortiz presents today to our Allergy Clinic at [facility] for a follow up visit.  As you know, she is a 34 year old female with allergic rhinitis. Percutaneous skin puncture testing for aeroallergens performed in November 2016 showed sensitivity to dog, cat, dust mite, molds, pollen of trees, grass, and weeds. In spring-summer 2018, she had a buildup using cluster schedule for allergen immunotherapy.  She reached maintenance dose in July 2018.     Allergy Immunotherapy  Date/time of injection(s): 5/22/2020  Vial Color                               Content                                  Dose  Red 1:1                                   Cat, Dog, Dust Mite                0.2mL  Red 1:1                                   Molds                                       0.2mL    Red 1:1                                   Trees                                       0.2mL  Red 1:1                                   Weeds                                     0.2mL     She tolerates injections well without persistent large local reactions or systemic reactions.  The patient is satisfied with allergen immunotherapy. She states that her symptoms significantly improved since she started it. She cannot tell exactly by how much, but she thinks that her symptoms improved by more than a half. She uses azelastine 1 spray in each nostril once daily. She believes it helps her. She does not take oral antihistamines and does not use nasal steroids.  In the past, she was taken cetirizine. She noticed that if she uses hydroxyzine, it helps more. It seems to improve several things at the same time, her  rhinoconjunctivitis symptoms, anxiety, and occasional pruritus of the skin. Maddy wonders if I can prescribe her hydroxyzine.  She may develop clear rhinorrhea, nasal itch, stuffiness, and sneezing several times a week. The symptoms are mild. If she takes hydroxyzine, Sudafed, and uses azelastine, her symptoms are improving within an hour.  Patient Active Problem List   Diagnosis     Animal dander allergy     CARDIOVASCULAR SCREENING; LDL GOAL LESS THAN 160     Psychosis (H)     Paranoid type delusional disorder (H)     Bipolar 1 disorder (H)     Insomnia     Depression with anxiety     Seasonal allergic rhinitis due to pollen     Allergic rhinitis due to mold     Allergic rhinitis due to animal dander     Allergic rhinitis due to dust mite     Encounter for IUD insertion     Schizoaffective disorder, bipolar type (H)     Gastroesophageal reflux disease without esophagitis     Paranoia (psychosis) (H)     Obesity (BMI 35.0-39.9) with comorbidity (H)       Past Medical History:   Diagnosis Date     Bipolar 1 disorder (H) 12/6/2012     Depressive disorder      Paranoid type delusional disorder (H) 11/14/2012      *Patient is Adopted       Problem (# of Occurrences) Relation (Name,Age of Onset)    Unknown/Adopted (3) Mother, Father, Other        Past Surgical History:   Procedure Laterality Date     TONSILLECTOMY & ADENOIDECTOMY      as a child     Social History     Socioeconomic History     Marital status: Single     Spouse name: None     Number of children: None     Years of education: None     Highest education level: None   Occupational History     None   Social Needs     Financial resource strain: None     Food insecurity     Worry: None     Inability: None     Transportation needs     Medical: None     Non-medical: None   Tobacco Use     Smoking status: Never Smoker     Smokeless tobacco: Never Used     Tobacco comment: around 2nd hand smoke   Substance and Sexual Activity     Alcohol use: Not Currently      Comment: rare wine ( very rare)     Drug use: No     Sexual activity: Not Currently     Partners: Male     Birth control/protection: I.U.D.   Lifestyle     Physical activity     Days per week: None     Minutes per session: None     Stress: None   Relationships     Social connections     Talks on phone: None     Gets together: None     Attends Jehovah's witness service: None     Active member of club or organization: None     Attends meetings of clubs or organizations: None     Relationship status: None     Intimate partner violence     Fear of current or ex partner: None     Emotionally abused: None     Physically abused: None     Forced sexual activity: None   Other Topics Concern     Parent/sibling w/ CABG, MI or angioplasty before 65F 55M? No     Comment: patient was adopted; unknown family history   Social History Narrative    One child    Education: some college        May 29, 2020    ENVIRONMENTAL HISTORY: [place] The home is heated with a electric furnace. They do not have central air conditioning, does have box air conditioner in the wall and has air purifier. The patient's bedroom is furnished with stuffed animals in bed, carpeting in bedroom and fabric window coverings. No pets. There is history of cockroach or mice infestation. There are no smokers in the house.  The apartment does not have a basement.            Review of Systems   Constitutional: Negative for activity change, chills and fever.   HENT: Positive for congestion, postnasal drip and sinus pain (occasionally in the morning). Negative for dental problem, ear pain, facial swelling, nosebleeds, rhinorrhea, sinus pressure and sneezing.    Eyes: Negative for discharge, redness and itching.   Respiratory: Negative for cough, chest tightness, shortness of breath and wheezing.    Cardiovascular: Negative for chest pain.   Gastrointestinal: Positive for diarrhea. Negative for nausea and vomiting.    Musculoskeletal: Negative for arthralgias, joint swelling and myalgias.   Skin: Negative for rash.   Allergic/Immunologic: Positive for environmental allergies.   Neurological: Positive for headaches.   Hematological: Negative for adenopathy.   Psychiatric/Behavioral: Positive for behavioral problems (started day treatment). Negative for self-injury.           Current Outpatient Medications:      azelastine (ASTELIN) 0.1 % nasal spray, Spray 2 sprays into both nostrils 2 times daily as needed for rhinitis, Disp: 1 Bottle, Rfl: 5     benztropine (COGENTIN) 0.5 MG tablet, as needed, Disp: , Rfl:      hydrOXYzine (ATARAX) 10 MG tablet, as needed, Disp: , Rfl:      hydrOXYzine (ATARAX) 10 MG tablet, Take 1 tablet (10 mg) by mouth daily as needed for itching (nasal al,lergy symptoms), Disp: 90 tablet, Rfl: 1     levonorgestrel (MIRENA) 20 MCG/24HR IUD, 1 each (20 mcg) by Intrauterine route continuous, Disp: , Rfl:      Melatonin 10 MG TABS tablet, Take 10 mg by mouth nightly as needed for sleep, Disp: , Rfl:      mometasone (NASONEX) 50 MCG/ACT nasal spray, Spray 2 sprays into both nostrils daily, Disp: 17 g, Rfl: 6     ORDER FOR ALLERGEN IMMUNOTHERAPY, Cat Hair, Standardized 10,000 BAU/mL, ALK  3.0 ml Dog Hair Dander, A. P.  1:100 w/v, HS  1.0 ml Dust Mites F 30,000AU/mL, HS  0.5 ml Dust Mites P. 30,000 AU/mL, HS  0.5 ml  Diluent: HSA qs to 5ml, Disp: 5 mL, Rfl: PRN     ORDER FOR ALLERGEN IMMUNOTHERAPY, Alternaria Tenuis 1:10 w/v, HS  0.5 ml Epicoccum Nigrum 1:10 w/v, HS 0.5 ml Hormodendrum Cladosporioides 1:10 w/v, HS 0.5 ml Diluent: HSA qs to 5ml, Disp: 5 mL, Rfl: PRN     ORDER FOR ALLERGEN IMMUNOTHERAPY, Estuardo, White  1:20 w/v, HS  0.5 ml Birch Mix PRW 1:20 w/v, HS  0.5 ml Elm, American 1:20 w/v, HS  0.5 ml Hackberry 1:20 w/v, HS 0.5 ml Hickory, Shagbark 1:20 w/v, HS  0.5 ml [place] Mix RW 1:20 w/v, HS 0.5 ml Oak Mix RVW 1:20 w/v, HS 0.5 ml [place] Tree, Black 1:20 w/v, HS 0.5 ml [place], Black 1:20 w/v, HS 0.5 ml  Diluent: HSA qs to 5ml, Disp: 5 mL, Rfl: PRN     ORDER FOR ALLERGEN IMMUNOTHERAPY, Kochia 1:20 w/v, HS 1.0 ml Nettle 1:20 w/v, HS 1.0 ml Plantain, English 1:20 w/v, HS 1.0 ml Ragweed Mixed 1:20 w/v ALK  0.6 ml Russian Thistle 1:20 w/v, HS 1.0 ml Diluent: HSA qs to 5ml, Disp: 5 mL, Rfl: PRN     sertraline (ZOLOFT) 50 MG tablet, daily, Disp: , Rfl:      VRAYLAR 4.5 MG CAPS capsule, TK 1 C PO D, Disp: , Rfl:      adapalene (DIFFERIN) 0.1 % external cream, Apply topically At Bedtime (Patient not taking: Reported on 5/29/2020), Disp: 45 g, Rfl: 3     ARIPiprazole (ABILIFY) 10 MG tablet, Take 5 mg by mouth daily, Disp: , Rfl: 1     ClonazePAM (KLONOPIN PO), Take 1 mg by mouth daily as needed for anxiety , Disp: , Rfl:      EPINEPHrine (EPIPEN/ADRENACLICK/OR ANY BX GENERIC EQUIV) 0.3 MG/0.3ML injection 2-pack, Inject 0.3 mLs (0.3 mg) into the muscle as needed for anaphylaxis (Patient not taking: Reported on 3/17/2020), Disp: 0.6 mL, Rfl: 3     escitalopram (LEXAPRO) 20 MG tablet, Take 20 mg by mouth daily, Disp: , Rfl:      LATUDA 40 MG TABS tablet, 40 mg 3 tablets at night, Disp: , Rfl: 3     LATUDA 80 MG TABS tablet, 80 mg 2 times daily , Disp: , Rfl:      OLANZapine (ZYPREXA) 10 MG tablet, 5 mg, Disp: , Rfl: 3     OLANZapine (ZYPREXA) 20 MG tablet, Take 20 mg by mouth At Bedtime, Disp: , Rfl:      Pseudoephedrine HCl (SUDAFED PO), Take by mouth as needed for congestion, Disp: , Rfl:      ranitidine (ZANTAC) 150 MG tablet, Take 150 mg by mouth At Bedtime, Disp: , Rfl:      traZODone (DESYREL) 50 MG tablet, Take 50 mg by mouth nightly as needed for sleep , Disp: 30 tablet, Rfl: 3  Immunization History   Administered Date(s) Administered     DTAP (<7y) 03/01/1985, 06/01/1985, 07/01/1985, 07/01/1986, 07/26/1990     HPV Quadrivalent 12/12/2008, 09/06/2011     Hep B, Peds or Adolescent 02/16/1998     Historical DTP/aP 03/01/1985, 06/01/1985, 07/01/1985, 07/01/1986, 07/26/1990     Historical Hepb 02/16/1998     Influenza  (IIV3) PF 12/09/2008, 12/06/2012     Influenza Vaccine IM > 6 months Valent IIV4 11/21/2017, 01/03/2019, 09/17/2019     MMR 02/27/1986, 07/17/1997     Poliovirus, inactivated (IPV) 06/01/1985, 07/01/1985, 01/01/1987, 07/26/1990     TDAP Vaccine (Adacel) 06/30/1997, 03/31/2008, 05/14/2010, 12/06/2012     Td (Adult), Adsorbed 06/30/1997     Allergies   Allergen Reactions     Animal Dander      Other reaction(s): *Unknown     Metformin Other (See Comments)     fatigue     Mold      Trees      OBJECTIVE:                                                                 /84 (BP Location: Left arm, Patient Position: Sitting, Cuff Size: Adult Large)   Pulse 108   Temp 97.7  F (36.5  C) (Tympanic)   Wt 96.3 kg (212 lb 4.9 oz)   SpO2 95%   BMI 35.33 kg/m          Physical Exam  Vitals signs and nursing note reviewed.   Constitutional:       General: She is not in acute distress.     Appearance: She is not diaphoretic.   HENT:      Head: Normocephalic and atraumatic.      Right Ear: Tympanic membrane, ear canal and external ear normal.      Left Ear: Tympanic membrane, ear canal and external ear normal.      Nose: Septal deviation (mild, to the right) present. No mucosal edema or rhinorrhea.   Eyes:      General:         Right eye: No discharge.         Left eye: No discharge.      Conjunctiva/sclera: Conjunctivae normal.   Neck:      Musculoskeletal: Normal range of motion.   Cardiovascular:      Rate and Rhythm: Normal rate and regular rhythm.      Heart sounds: Normal heart sounds. No murmur.   Pulmonary:      Effort: Pulmonary effort is normal. No respiratory distress.      Breath sounds: Normal breath sounds. No wheezing or rales.   Musculoskeletal: Normal range of motion.   Lymphadenopathy:      Cervical: No cervical adenopathy.   Skin:     General: Skin is warm.      Findings: No rash.   Neurological:      Mental Status: She is alert and oriented to person, place, and time.             ASSESSMENT/PLAN:    1.  Chronic seasonal allergic rhinitis due to pollen 2. Allergic rhinitis due to mold 3. Allergic rhinitis due to dust mite 4. Allergic rhinitis due to animal dander 5. Seasonal allergic rhinitis due to pollen  Symptoms seem to be well controlled.  -Continue allergen immunotherapy.  It seems to be effective and the patient is satisfied with the results.  Anticipate treatment until 2023.  - If needed, she can increase azelastine up to 2 sprays in each nostril twice daily.  - She can take hydroxyzine 10 mg by mouth once daily if needed.  She prefers hydroxyzine.  It also controls her anxiety and occasional pruritus of the skin.  - Advised against using oral decongestants frequently.  If she starts having persistent nasal symptoms, I recommend using an intranasal steroid, like mometasone 2 sprays in each nostril once daily.     - hydrOXYzine (ATARAX) 10 MG tablet  Dispense: 90 tablet; Refill: 1  - mometasone (NASONEX) 50 MCG/ACT nasal spray  Dispense: 17 g; Refill: 6  - azelastine (ASTELIN) 0.1 % nasal spray  Dispense: 1 Bottle; Refill: 5      Return in about 1 year (around 5/29/2021), or if symptoms worsen or fail to improve.    Thank you for allowing us to participate in the care of this patient. Please feel free to contact us if there are any questions or concerns about the patient.    Disclaimer: This note consists of symbols derived from keyboarding, dictation and/or voice recognition software. As a result, there may be errors in the script that have gone undetected. Please consider this when interpreting information found in this chart.    Rudy Shin MD, FAAAAI, FACAAI  Allergy, Asthma and Immunology  [place], [place] and [place]      Again, thank you for allowing me to participate in the care of your patient.        Sincerely,        Rudy Shin MD

## 2020-06-04 DIAGNOSIS — J30.81 ALLERGIC RHINITIS DUE TO ANIMAL DANDER: ICD-10-CM

## 2020-06-04 DIAGNOSIS — J30.89 ALLERGIC RHINITIS DUE TO MOLD: ICD-10-CM

## 2020-06-04 DIAGNOSIS — J30.89 ALLERGIC RHINITIS DUE TO DUST MITE: ICD-10-CM

## 2020-06-04 DIAGNOSIS — J30.1 CHRONIC SEASONAL ALLERGIC RHINITIS DUE TO POLLEN: ICD-10-CM

## 2020-06-04 PROCEDURE — 95165 ANTIGEN THERAPY SERVICES: CPT | Performed by: ALLERGY & IMMUNOLOGY

## 2020-06-04 NOTE — TELEPHONE ENCOUNTER
Allergy serums received at [place].     Vials received below:    Vial Color Content                      Vial Size Expiration Date  Red 1:1 Cat, Dog, Dust Mite 5mL  06/01/2021  Red 1:1 Weeds 5mL  06/01/2021  Red 1:1 Molds 5mL  06/01/2021  Red 1:1 Trees 5mL  06/01/2021      Signature  Kenney Pena LPN

## 2020-06-04 NOTE — PROGRESS NOTES
Allergy serums billed at [place].     Vials billed below:    Vial Color Content                      Vial Size Expiration Date  Red 1:1 Cat, Dog, Dust Mite 5mL  06/01/2021  Red 1:1 Weeds 5mL  06/01/2021  Red 1:1 Molds 5mL  06/01/2021  Red 1:1 Trees 5mL  06/01/2021    Original Refill encounter date: 05/28/2020      Signature  Kenney Pena LPN

## 2020-06-16 ENCOUNTER — OFFICE VISIT - HEALTHEAST (OUTPATIENT)
Dept: SURGERY | Facility: CLINIC | Age: 35
End: 2020-06-16

## 2020-06-16 DIAGNOSIS — E66.812 OBESITY, CLASS II, BMI 35-39.9, ISOLATED (SEE ACTUAL BMI): ICD-10-CM

## 2020-06-16 DIAGNOSIS — K21.9 GASTROESOPHAGEAL REFLUX DISEASE WITHOUT ESOPHAGITIS: ICD-10-CM

## 2020-06-16 ASSESSMENT — MIFFLIN-ST. JEOR: SCORE: 1611.68

## 2020-06-18 ENCOUNTER — TELEPHONE (OUTPATIENT)
Dept: ALLERGY | Facility: CLINIC | Age: 35
End: 2020-06-18

## 2020-06-18 NOTE — TELEPHONE ENCOUNTER
Patient's Red 1:1 serums  on 2020. Last injection given 2020. Serums discarded.    Kenney Pena LPN

## 2020-06-19 ENCOUNTER — ALLIED HEALTH/NURSE VISIT (OUTPATIENT)
Dept: ALLERGY | Facility: CLINIC | Age: 35
End: 2020-06-19
Payer: COMMERCIAL

## 2020-06-19 DIAGNOSIS — Z51.6 NEED FOR DESENSITIZATION TO ALLERGENS: Primary | ICD-10-CM

## 2020-06-19 PROCEDURE — 99207 ZZC DROP WITH A PROCEDURE: CPT

## 2020-06-19 PROCEDURE — 95117 IMMUNOTHERAPY INJECTIONS: CPT

## 2020-06-23 ENCOUNTER — OFFICE VISIT - HEALTHEAST (OUTPATIENT)
Dept: SURGERY | Facility: CLINIC | Age: 35
End: 2020-06-23

## 2020-06-23 DIAGNOSIS — Z71.3 NUTRITIONAL COUNSELING: ICD-10-CM

## 2020-06-23 DIAGNOSIS — E66.812 OBESITY, CLASS II, BMI 35-39.9, ISOLATED (SEE ACTUAL BMI): ICD-10-CM

## 2020-06-23 ASSESSMENT — MIFFLIN-ST. JEOR: SCORE: 1634.36

## 2020-06-26 ENCOUNTER — TELEPHONE (OUTPATIENT)
Dept: OPTOMETRY | Facility: CLINIC | Age: 35
End: 2020-06-26

## 2020-07-14 ENCOUNTER — TRANSFERRED RECORDS (OUTPATIENT)
Dept: HEALTH INFORMATION MANAGEMENT | Facility: CLINIC | Age: 35
End: 2020-07-14

## 2020-08-05 ENCOUNTER — TRANSFERRED RECORDS (OUTPATIENT)
Dept: HEALTH INFORMATION MANAGEMENT | Facility: CLINIC | Age: 35
End: 2020-08-05

## 2020-08-13 ENCOUNTER — ALLIED HEALTH/NURSE VISIT (OUTPATIENT)
Dept: ALLERGY | Facility: CLINIC | Age: 35
End: 2020-08-13
Payer: COMMERCIAL

## 2020-08-13 ENCOUNTER — OFFICE VISIT (OUTPATIENT)
Dept: FAMILY MEDICINE | Facility: CLINIC | Age: 35
End: 2020-08-13
Payer: COMMERCIAL

## 2020-08-13 VITALS
RESPIRATION RATE: 12 BRPM | SYSTOLIC BLOOD PRESSURE: 116 MMHG | WEIGHT: 223 LBS | BODY MASS INDEX: 37.15 KG/M2 | HEIGHT: 65 IN | TEMPERATURE: 98.2 F | OXYGEN SATURATION: 96 % | HEART RATE: 85 BPM | DIASTOLIC BLOOD PRESSURE: 80 MMHG

## 2020-08-13 DIAGNOSIS — M54.50 CHRONIC BILATERAL LOW BACK PAIN WITHOUT SCIATICA: Primary | ICD-10-CM

## 2020-08-13 DIAGNOSIS — Z51.6 NEED FOR DESENSITIZATION TO ALLERGENS: Primary | ICD-10-CM

## 2020-08-13 DIAGNOSIS — G89.29 CHRONIC BILATERAL LOW BACK PAIN WITHOUT SCIATICA: Primary | ICD-10-CM

## 2020-08-13 PROCEDURE — 95117 IMMUNOTHERAPY INJECTIONS: CPT

## 2020-08-13 PROCEDURE — 99207 ZZC DROP WITH A PROCEDURE: CPT

## 2020-08-13 PROCEDURE — 99213 OFFICE O/P EST LOW 20 MIN: CPT | Performed by: NURSE PRACTITIONER

## 2020-08-13 ASSESSMENT — MIFFLIN-ST. JEOR: SCORE: 1707.4

## 2020-08-13 NOTE — PROGRESS NOTES
"Subjective     Maddy Ortiz is a 34 year old female who presents to clinic today for the following health issues:    HPI       Back Pain       Duration: chronic        Specific cause: none  She thinks she has an alignment problem  Or her muscles are weakening  Wants to see a chiropractor    Description:   Location of pain: low back bilateral, worse on the right  Character of pain: dull ache  Pain radiation:none  New numbness or weakness in legs, not attributed to pain:  no     Intensity: moderate    History:   Pain interferes with job: Not applicable  History of back problems: recurrent self limited episodes of low back pain in the past  Any previous MRI or X-rays: Yes- at [facility].  Date xray of lumbar spine 11/21/2017;  MR of sacrum 12/27/2019  Sees a specialist for back pain:  No  Therapies tried without relief: tylenol gives temporary relief;  Hasn't really tried other things.    Alleviating factors:   Improved by: Physical Therapy      Precipitating factors:  Worsened by: Sitting      Accompanying Signs & Symptoms:  Risk of Fracture:  None  Risk of Cauda Equina:  None  Risk of Infection:  None  Risk of Cancer:  None  Risk of Ankylosing Spondylitis:  Onset at age <35, male, AND morning back stiffness. no       Reviewed and updated as needed this visit by Provider         Review of Systems   Constitutional, HEENT, cardiovascular, pulmonary, gi and gu systems are negative, except as otherwise noted.      Objective    /80 (BP Location: Right arm)   Pulse 85   Temp 98.2  F (36.8  C) (Tympanic)   Resp 12   Ht 1.651 m (5' 5\")   Wt 101.2 kg (223 lb)   SpO2 96%   BMI 37.11 kg/m    Body mass index is 37.11 kg/m .  Physical Exam   GENERAL: healthy, alert and no distress  Comprehensive back pain exam:  No tenderness, Range of motion not limited by pain, Lower extremity strength functional and equal on both sides, Lower extremity reflexes within normal limits bilaterally, Lower extremity sensation normal and " equal on both sides and Straight leg raise negative bilaterally            Assessment & Plan       ICD-10-CM    1. Chronic bilateral low back pain without sciatica  M54.5 Orthopedic & Spine  Referral    G89.29 PHYSICAL THERAPY REFERRAL     Chronic low back pain, worse on the right.  No radicular symptoms.  Exam benign.  Options discussed.   She would like a referral for chiropractor and PT.        Return in about 4 weeks (around 9/10/2020), or if symptoms worsen or fail to improve.    The risks, benefits and treatment options of prescribed medications or other treatments have been discussed with the patient. The patient verbalized their understanding and should call or follow up if no improvement or if they develop further problems.    PHILL Luo [facility]

## 2020-08-14 ENCOUNTER — COMMUNICATION - HEALTHEAST (OUTPATIENT)
Dept: SURGERY | Facility: CLINIC | Age: 35
End: 2020-08-14

## 2020-08-14 ENCOUNTER — TELEPHONE (OUTPATIENT)
Dept: FAMILY MEDICINE | Facility: CLINIC | Age: 35
End: 2020-08-14

## 2020-08-14 DIAGNOSIS — M54.50 CHRONIC BILATERAL LOW BACK PAIN WITHOUT SCIATICA: Primary | ICD-10-CM

## 2020-08-14 DIAGNOSIS — G89.29 CHRONIC BILATERAL LOW BACK PAIN WITHOUT SCIATICA: Primary | ICD-10-CM

## 2020-08-14 NOTE — TELEPHONE ENCOUNTER
----- Message from Jeinffer Motta sent at 8/14/2020 10:13 AM CDT -----  Regarding: chiropractor referral  Hello and Happy Friday!,    I'm with ortho .  We have received an order for a chiropractor and this is not something ortho con schedules for.  Could you please put in an order for [facility] CHIROPRACTIC REFERRAL  # [phone] instead?      Thanks,    Jeniffer Motta  [facility]

## 2020-08-15 ENCOUNTER — NURSE TRIAGE (OUTPATIENT)
Dept: NURSING | Facility: CLINIC | Age: 35
End: 2020-08-15

## 2020-08-16 NOTE — TELEPHONE ENCOUNTER
Pt is calling.    She stated that she has increase fatigue. She has slept all day and night for the last 2 days/nights. She currently has an IUD in place, and is currently having her menses.  Bleeding is light.  She is complaining of lower back pain and lower abdominal cramping. Bleeding has been for the past week. She stated that she normally does not have her menses with the IUD, but she will have spotting. This is more than spotting and has lasted for a week now. Usually she has spotting for 4 days. History of low back pain, so she is unsure if this is more than usual or from her back injury or not, but she is having cramping. No fever.  Pt stated that she does not know how to check the strings on her IUD and has never done that.  I reviewed with her over the phone, on how to check for them.   I advised her to get a pregnancy test and test for that.  I advised her that depending on the type of IUD, the Mirena needs to be replaced every 5 hours. She stated that she has had this one for over 4 years, and is unsure how long. I encouraged her to follow up with her PCP or GYN regarding her IUD to see if it is due to change it out. May need to be seen for the irregular bleeding as well. Call back immediately if the pregnancy test comes back positive, fever, severe pain.  If increase fatigue continues, follow up with PCP regarding this, with an appointment.  Call back with any new or worsening signs, symptoms, concerns, or questions.  She verbalized understanding.      Reason for Disposition    Periods last > 7 days    Additional Information    Negative: Shock suspected (e.g., cold/pale/clammy skin, too weak to stand, low BP, rapid pulse)    Negative: Difficult to awaken or acting confused (e.g., disoriented, slurred speech)    Negative: Passed out (i.e., lost consciousness, collapsed and was not responding)    Negative: Sounds like a life-threatening emergency to the triager    Negative: Followed a genital area  injury    Negative: Pregnant > 20 weeks  (5 months or more)    Negative: Pregnant < 20 weeks  (less than 5 months)    Negative: Postpartum (from 0 to 6 weeks after delivery)    Negative: Bleeding occurring > 12 months after menopause    Negative: Bleeding from sexual abuse or rape    Negative: [1] Vaginal discharge is main symptom AND [2] small amount of blood    Negative: SEVERE abdominal pain    Negative: SEVERE dizziness (e.g., unable to stand, requires support to walk, feels like passing out now)    Negative: SEVERE vaginal bleeding (i.e., soaking 2 pads or tampons per hour and present 2 or more hours; 1 menstrual cup every 2 hours)    Negative: Patient sounds very sick or weak to the triager    Negative: MODERATE vaginal bleeding (i.e., soaking 1 pad or tampon per hour and present > 6 hours; 1 menstrual cup every 6 hours)    Negative: [1] Constant abdominal pain AND [2] present > 2 hours    Negative: Pale skin (pallor) of new onset or worsening    Negative: Passed tissue (e.g., gray-white)    Negative: Taking Coumadin (warfarin) or other strong blood thinner, or known bleeding disorder (e.g., thrombocytopenia)    Negative: [1] Skin bruises or nosebleed AND [2] not caused by an injury    Negative: [1] Periods with > 6 soaked pads or tampons per day AND [2] last > 7 days    Negative: [1] Bleeding or spotting after procedure (e.g., biopsy) or pelvic examination (e.g., pap smear) AND [2] lasts > 7 days    Negative: Periods with > 6 soaked pads or tampons per day    Protocols used: VAGINAL BLEEDING - [id number]    Radha Agee RN  [facility] Triage Nurse Advisor  8/15/2020 at 9:19 PM

## 2020-08-17 ENCOUNTER — OFFICE VISIT (OUTPATIENT)
Dept: FAMILY MEDICINE | Facility: CLINIC | Age: 35
End: 2020-08-17
Payer: COMMERCIAL

## 2020-08-17 VITALS
HEIGHT: 65 IN | DIASTOLIC BLOOD PRESSURE: 84 MMHG | WEIGHT: 220.4 LBS | OXYGEN SATURATION: 98 % | SYSTOLIC BLOOD PRESSURE: 116 MMHG | TEMPERATURE: 97.9 F | HEART RATE: 92 BPM | BODY MASS INDEX: 36.72 KG/M2

## 2020-08-17 DIAGNOSIS — R53.83 FATIGUE, UNSPECIFIED TYPE: Primary | ICD-10-CM

## 2020-08-17 LAB
ANION GAP SERPL CALCULATED.3IONS-SCNC: 2 MMOL/L (ref 3–14)
BUN SERPL-MCNC: 15 MG/DL (ref 7–30)
CALCIUM SERPL-MCNC: 8.4 MG/DL (ref 8.5–10.1)
CHLORIDE SERPL-SCNC: 107 MMOL/L (ref 94–109)
CO2 SERPL-SCNC: 28 MMOL/L (ref 20–32)
CREAT SERPL-MCNC: 0.8 MG/DL (ref 0.52–1.04)
ERYTHROCYTE [DISTWIDTH] IN BLOOD BY AUTOMATED COUNT: 12 % (ref 10–15)
GFR SERPL CREATININE-BSD FRML MDRD: >90 ML/MIN/{1.73_M2}
GLUCOSE SERPL-MCNC: 88 MG/DL (ref 70–99)
HCG SERPL QL: NEGATIVE
HCT VFR BLD AUTO: 43.1 % (ref 35–47)
HGB BLD-MCNC: 14.8 G/DL (ref 11.7–15.7)
MCH RBC QN AUTO: 29.9 PG (ref 26.5–33)
MCHC RBC AUTO-ENTMCNC: 34.3 G/DL (ref 31.5–36.5)
MCV RBC AUTO: 87 FL (ref 78–100)
PLATELET # BLD AUTO: 250 10E9/L (ref 150–450)
POTASSIUM SERPL-SCNC: 4.1 MMOL/L (ref 3.4–5.3)
RBC # BLD AUTO: 4.95 10E12/L (ref 3.8–5.2)
SODIUM SERPL-SCNC: 137 MMOL/L (ref 133–144)
TSH SERPL DL<=0.005 MIU/L-ACNC: 1.69 MU/L (ref 0.4–4)
WBC # BLD AUTO: 12.4 10E9/L (ref 4–11)

## 2020-08-17 PROCEDURE — 84703 CHORIONIC GONADOTROPIN ASSAY: CPT | Performed by: NURSE PRACTITIONER

## 2020-08-17 PROCEDURE — 85027 COMPLETE CBC AUTOMATED: CPT | Performed by: NURSE PRACTITIONER

## 2020-08-17 PROCEDURE — 84443 ASSAY THYROID STIM HORMONE: CPT | Performed by: NURSE PRACTITIONER

## 2020-08-17 PROCEDURE — 36415 COLL VENOUS BLD VENIPUNCTURE: CPT | Performed by: NURSE PRACTITIONER

## 2020-08-17 PROCEDURE — 80048 BASIC METABOLIC PNL TOTAL CA: CPT | Performed by: NURSE PRACTITIONER

## 2020-08-17 PROCEDURE — 99213 OFFICE O/P EST LOW 20 MIN: CPT | Performed by: NURSE PRACTITIONER

## 2020-08-17 ASSESSMENT — MIFFLIN-ST. JEOR: SCORE: 1695.61

## 2020-08-17 NOTE — PROGRESS NOTES
"Subjective     Maddy Ortiz is a 34 year old female who presents to clinic today for the following health issues:    HPI       Concern - Sleeping a lot   Onset: Started Friday     Description:   Sleeping more than normal feeling fatigue, notes she has PMS and is also wondering if she has low iron.    Above HPI reviewed. Additionally, significant mental health history including depression. Notes that her mother  2 weeks ago and she is feeling quite overwhelmed. Met with her counselor this morning and will meet with her again next week. Taking medications as prescribed.    Has IUD, recently had first period in quite a while. Was not particularly heavy. Last intercourse 2 weeks ago.      Review of Systems   Constitutional, HEENT, cardiovascular, pulmonary, gi and gu systems are negative, except as otherwise noted.      Objective    /84 (BP Location: Right arm, Patient Position: Sitting, Cuff Size: Adult Large)   Pulse 92   Temp 97.9  F (36.6  C) (Tympanic)   Ht 1.651 m (5' 5\")   Wt 100 kg (220 lb 6.4 oz)   SpO2 98%   BMI 36.68 kg/m    Body mass index is 36.68 kg/m .  Physical Exam  Vitals signs and nursing note reviewed.   Constitutional:       General: She is not in acute distress.     Appearance: Normal appearance.   HENT:      Head: Normocephalic and atraumatic.      Mouth/Throat:      Mouth: Mucous membranes are moist.   Neck:      Musculoskeletal: Neck supple.   Cardiovascular:      Rate and Rhythm: Normal rate and regular rhythm.      Heart sounds: No murmur. No friction rub. No gallop.    Pulmonary:      Effort: Pulmonary effort is normal. No respiratory distress.      Breath sounds: Normal breath sounds.   Skin:     General: Skin is warm and dry.   Neurological:      General: No focal deficit present.      Mental Status: She is alert.   Psychiatric:         Mood and Affect: Affect is flat.         Speech: Speech is delayed.         Behavior: Behavior is slowed.         Thought Content: Thought " content does not include homicidal or suicidal ideation. Thought content does not include homicidal or suicidal plan.      Comments: Poor eye contact            Diagnostic Test Results:  Labs reviewed in Epic  Results for orders placed or performed in visit on 08/17/20 (from the past 24 hour(s))   CBC with platelets   Result Value Ref Range    WBC 12.4 (H) 4.0 - 11.0 10e9/L    RBC Count 4.95 3.8 - 5.2 10e12/L    Hemoglobin 14.8 11.7 - 15.7 g/dL    Hematocrit 43.1 35.0 - 47.0 %    MCV 87 78 - 100 fl    MCH 29.9 26.5 - 33.0 pg    MCHC 34.3 31.5 - 36.5 g/dL    RDW 12.0 10.0 - 15.0 %    Platelet Count 250 150 - 450 10e9/L   HCG qualitative, Blood (CMI102)   Result Value Ref Range    HCG Qualitative Serum Negative NEG^Negative           Assessment & Plan     1. Fatigue, unspecified type  We will check labs today, however I suspect this is due to depression. Mild leukocytosis, however labs reviewed and WBC is regularly elevated. Negative HCG. TSH, BMP pending. She will follow closely with her mental health providers.  - CBC with platelets  - TSH with free T4 reflex  - Basic metabolic panel  - HCG qualitative, Blood ([id number])  - JUST IN CASE       Patient Instructions   Labs today. I will call with results.  Rest.  Make sure you keep working with your therapist.      Return in about 1 week (around 8/24/2020) for worsening or continued symptoms.    PHLIL Martinez [facility]

## 2020-08-17 NOTE — NURSING NOTE
"Initial /84 (BP Location: Right arm, Patient Position: Sitting, Cuff Size: Adult Large)   Pulse 92   Temp 97.9  F (36.6  C) (Tympanic)   Ht 1.651 m (5' 5\")   Wt 100 kg (220 lb 6.4 oz)   SpO2 98%   BMI 36.68 kg/m   Estimated body mass index is 36.68 kg/m  as calculated from the following:    Height as of this encounter: 1.651 m (5' 5\").    Weight as of this encounter: 100 kg (220 lb 6.4 oz). .      "

## 2020-08-20 ENCOUNTER — ALLIED HEALTH/NURSE VISIT (OUTPATIENT)
Dept: ALLERGY | Facility: CLINIC | Age: 35
End: 2020-08-20
Payer: COMMERCIAL

## 2020-08-20 DIAGNOSIS — Z51.6 NEED FOR DESENSITIZATION TO ALLERGENS: Primary | ICD-10-CM

## 2020-08-20 PROCEDURE — 99207 ZZC DROP WITH A PROCEDURE: CPT

## 2020-08-20 PROCEDURE — 95117 IMMUNOTHERAPY INJECTIONS: CPT

## 2020-08-27 ENCOUNTER — ALLIED HEALTH/NURSE VISIT (OUTPATIENT)
Dept: ALLERGY | Facility: CLINIC | Age: 35
End: 2020-08-27
Payer: COMMERCIAL

## 2020-08-27 DIAGNOSIS — Z51.6 NEED FOR DESENSITIZATION TO ALLERGENS: Primary | ICD-10-CM

## 2020-08-27 PROCEDURE — 99207 ZZC DROP WITH A PROCEDURE: CPT

## 2020-08-27 PROCEDURE — 95117 IMMUNOTHERAPY INJECTIONS: CPT

## 2020-08-31 ENCOUNTER — TRANSFERRED RECORDS (OUTPATIENT)
Dept: HEALTH INFORMATION MANAGEMENT | Facility: CLINIC | Age: 35
End: 2020-08-31

## 2020-09-03 ENCOUNTER — ALLIED HEALTH/NURSE VISIT (OUTPATIENT)
Dept: ALLERGY | Facility: CLINIC | Age: 35
End: 2020-09-03
Payer: COMMERCIAL

## 2020-09-03 DIAGNOSIS — Z51.6 NEED FOR DESENSITIZATION TO ALLERGENS: Primary | ICD-10-CM

## 2020-09-03 PROCEDURE — 95117 IMMUNOTHERAPY INJECTIONS: CPT

## 2020-09-03 PROCEDURE — 99207 ZZC DROP WITH A PROCEDURE: CPT

## 2020-09-08 ENCOUNTER — ALLIED HEALTH/NURSE VISIT (OUTPATIENT)
Dept: ALLERGY | Facility: CLINIC | Age: 35
End: 2020-09-08
Payer: COMMERCIAL

## 2020-09-08 ENCOUNTER — THERAPY VISIT (OUTPATIENT)
Dept: CHIROPRACTIC MEDICINE | Facility: CLINIC | Age: 35
End: 2020-09-08
Payer: COMMERCIAL

## 2020-09-08 ENCOUNTER — OFFICE VISIT (OUTPATIENT)
Dept: ALLERGY | Facility: CLINIC | Age: 35
End: 2020-09-08
Payer: COMMERCIAL

## 2020-09-08 ENCOUNTER — HOSPITAL ENCOUNTER (EMERGENCY)
Facility: CLINIC | Age: 35
Discharge: HOME | End: 2020-09-08
Attending: EMERGENCY MEDICINE | Admitting: EMERGENCY MEDICINE
Payer: COMMERCIAL

## 2020-09-08 VITALS
OXYGEN SATURATION: 97 % | WEIGHT: 220 LBS | HEIGHT: 63 IN | BODY MASS INDEX: 38.98 KG/M2 | DIASTOLIC BLOOD PRESSURE: 88 MMHG | RESPIRATION RATE: 23 BRPM | HEART RATE: 105 BPM | TEMPERATURE: 97.8 F | SYSTOLIC BLOOD PRESSURE: 150 MMHG

## 2020-09-08 VITALS
HEART RATE: 119 BPM | SYSTOLIC BLOOD PRESSURE: 137 MMHG | DIASTOLIC BLOOD PRESSURE: 93 MMHG | RESPIRATION RATE: 22 BRPM | OXYGEN SATURATION: 95 %

## 2020-09-08 DIAGNOSIS — T78.2XXA ACUTE ANAPHYLAXIS, INITIAL ENCOUNTER: ICD-10-CM

## 2020-09-08 DIAGNOSIS — M99.05 SEGMENTAL DYSFUNCTION OF PELVIC REGION: Primary | ICD-10-CM

## 2020-09-08 DIAGNOSIS — M99.03 SEGMENTAL DYSFUNCTION OF LUMBAR REGION: ICD-10-CM

## 2020-09-08 DIAGNOSIS — M25.611 SHOULDER STIFFNESS, RIGHT: ICD-10-CM

## 2020-09-08 DIAGNOSIS — M99.02 THORACIC SEGMENT DYSFUNCTION: ICD-10-CM

## 2020-09-08 DIAGNOSIS — T45.0X5A ANAPHYLAXIS DUE TO ALLERGEN IMMUNOTHERAPY: Primary | ICD-10-CM

## 2020-09-08 DIAGNOSIS — M54.2 CERVICALGIA: ICD-10-CM

## 2020-09-08 DIAGNOSIS — M99.01 CERVICAL SEGMENT DYSFUNCTION: ICD-10-CM

## 2020-09-08 DIAGNOSIS — M99.07 SOMATIC DYSFUNCTION OF UPPER EXTREMITIES: ICD-10-CM

## 2020-09-08 DIAGNOSIS — G89.29 CHRONIC BILATERAL LOW BACK PAIN WITHOUT SCIATICA: ICD-10-CM

## 2020-09-08 DIAGNOSIS — M62.838 SPASM OF MUSCLE: ICD-10-CM

## 2020-09-08 DIAGNOSIS — M54.50 CHRONIC BILATERAL LOW BACK PAIN WITHOUT SCIATICA: ICD-10-CM

## 2020-09-08 DIAGNOSIS — T88.6XXA ANAPHYLAXIS DUE TO ALLERGEN IMMUNOTHERAPY: Primary | ICD-10-CM

## 2020-09-08 DIAGNOSIS — J30.9 ALLERGIC RHINITIS: ICD-10-CM

## 2020-09-08 DIAGNOSIS — Z51.6 NEED FOR DESENSITIZATION TO ALLERGENS: Primary | ICD-10-CM

## 2020-09-08 PROCEDURE — 95117 IMMUNOTHERAPY INJECTIONS: CPT

## 2020-09-08 PROCEDURE — 99284 EMERGENCY DEPT VISIT MOD MDM: CPT | Mod: 25 | Performed by: EMERGENCY MEDICINE

## 2020-09-08 PROCEDURE — 99207 ZZC DROP WITH A PROCEDURE: CPT

## 2020-09-08 PROCEDURE — 97110 THERAPEUTIC EXERCISES: CPT | Performed by: CHIROPRACTOR

## 2020-09-08 PROCEDURE — 98943 CHIROPRACT MANJ XTRSPINL 1/>: CPT | Performed by: CHIROPRACTOR

## 2020-09-08 PROCEDURE — 99203 OFFICE O/P NEW LOW 30 MIN: CPT | Mod: 25 | Performed by: CHIROPRACTOR

## 2020-09-08 PROCEDURE — 98941 CHIROPRACT MANJ 3-4 REGIONS: CPT | Mod: AT | Performed by: CHIROPRACTOR

## 2020-09-08 PROCEDURE — 25000128 H RX IP 250 OP 636: Performed by: EMERGENCY MEDICINE

## 2020-09-08 PROCEDURE — 25800030 ZZH RX IP 258 OP 636: Performed by: EMERGENCY MEDICINE

## 2020-09-08 PROCEDURE — 96375 TX/PRO/DX INJ NEW DRUG ADDON: CPT | Performed by: EMERGENCY MEDICINE

## 2020-09-08 PROCEDURE — 99215 OFFICE O/P EST HI 40 MIN: CPT | Mod: 25 | Performed by: ALLERGY & IMMUNOLOGY

## 2020-09-08 PROCEDURE — 96361 HYDRATE IV INFUSION ADD-ON: CPT | Performed by: EMERGENCY MEDICINE

## 2020-09-08 PROCEDURE — 96365 THER/PROPH/DIAG IV INF INIT: CPT | Performed by: EMERGENCY MEDICINE

## 2020-09-08 PROCEDURE — 99291 CRITICAL CARE FIRST HOUR: CPT | Mod: Z6 | Performed by: EMERGENCY MEDICINE

## 2020-09-08 RX ORDER — METHYLPREDNISOLONE SODIUM SUCCINATE 125 MG/2ML
125 INJECTION, POWDER, LYOPHILIZED, FOR SOLUTION INTRAMUSCULAR; INTRAVENOUS ONCE
Status: COMPLETED | OUTPATIENT
Start: 2020-09-08 | End: 2020-09-08

## 2020-09-08 RX ORDER — DEXAMETHASONE 4 MG/1
10 TABLET ORAL ONCE
Qty: 3 TABLET | Refills: 0 | Status: SHIPPED | OUTPATIENT
Start: 2020-09-08 | End: 2020-10-26

## 2020-09-08 RX ADMIN — METHYLPREDNISOLONE SODIUM SUCCINATE 125 MG: 125 INJECTION, POWDER, FOR SOLUTION INTRAMUSCULAR; INTRAVENOUS at 16:50

## 2020-09-08 RX ADMIN — SODIUM CHLORIDE 1000 ML: 9 INJECTION, SOLUTION INTRAVENOUS at 16:49

## 2020-09-08 RX ADMIN — FAMOTIDINE 20 MG: 20 INJECTION, SOLUTION INTRAVENOUS at 16:55

## 2020-09-08 ASSESSMENT — ENCOUNTER SYMPTOMS
CHILLS: 0
WHEEZING: 1
VOMITING: 0
SHORTNESS OF BREATH: 1
DIAPHORESIS: 0
FEVER: 0
TROUBLE SWALLOWING: 0
NAUSEA: 0
ABDOMINAL PAIN: 0
BACK PAIN: 1
DIARRHEA: 0
HEADACHES: 0
NERVOUS/ANXIOUS: 0
LIGHT-HEADEDNESS: 0
VOICE CHANGE: 0

## 2020-09-08 ASSESSMENT — MIFFLIN-ST. JEOR: SCORE: 1662.04

## 2020-09-08 NOTE — PROGRESS NOTES
Chiropractic Clinic Visit    PCP: Shirley Freeman    Maddy Ortiz is a 34 year old female who is seen  as a self referral presenting with low back and neck pain . Patient reports that the onset was many years ago insidiously. When asked, patient denies:, falling, slipping, bending and reaching or sleeping awkwardly. Maddy reports that she is having pain and stiffness in her low back and neck region.  She rates her symptoms at a 3 out of 10 and describes them as a stiff soreness.  There are no radiating symptoms and her sleep is not interrupted by pain.    Injury: none    Location of Pain: lower neck and lower back  at the following level(s) C6 , C7 , T1 , T2 , L4  and L5   Duration of Pain: many year(s)  Rating of Pain at worst: 7/10  Rating of Pain Currently: 3/10  Symptoms are better with: Rest and Standing  Symptoms are worse with: sitting and exercising  Additional Features:      Other evaluation and/or treatments so far consists of: Ice    Health History  as reported by the patient:    How does the patient rate their own health:   Poor    Current or past medical history:   Depression, High blood pressure, Incontinence, Menopause, Migraines/headaches, Numbness/tingling, Overweight and Sleep disorder/apnea    Medical allergies  None    Past Traumas/Surgeries  Other:  adenoids and tonsils    Family History  The family history includes Unknown/Adopted in her father, mother, and another family member. She was adopted.    Medications:  Anti-depressants, Pain and Sleep    Occupation:  homemaker    Primary job tasks:       Barriers as home/work:   none    Additional health Issues:                   Review of Systems  Musculoskeletal: as above  Remainder of review of systems is negative including constitutional, CV, pulmonary, GI, Skin and Neurologic except as noted in HPI or medical history.    Past Medical History:   Diagnosis Date     Bipolar 1 disorder (H) 12/6/2012     Depressive disorder      Paranoid type  "delusional disorder (H) 11/14/2012     Past Surgical History:   Procedure Laterality Date     TONSILLECTOMY & ADENOIDECTOMY      as a child       Objective  There were no vitals taken for this visit.    GENERAL APPEARANCE: healthy, alert and no distress   GAIT: NORMAL  SKIN: no suspicious lesions or rashes  NEURO: Normal strength and tone, mentation intact and speech normal  PSYCH:  mentation appears normal and affect normal/bright      Maddy was asked to complete the Neck Disability Index, the Oswestry Low Back Disability Index and Jailyn Start Back screening tool, today in the office. Patient declined to complete the NDI form., The Oswestry Disability score: 26%. Keel Start Total Score:4 Sub Score: 2       Cervical Spine Exam    Range of Motion:         Full active and passive ROM forward flexion, extension, lateral rotation, lateral flexion.    Inspection:         Anterior head position and severely forward shoulder positions    Tender:        upper border of trapezius    Non-Tender:        remainder of cervical spine area    Muscle strength:       C4 (shoulder shrug)  symmetric 5/5 Normal       C5 (shoulder abduction) symmetric 5/5 Normal       C6 (elbow flexion) symmetric 5/5 Normal       C7 (elbow extension) symmetric 5/5 Normal       C8 (finger abduction, thumb flexion) symmetric 5/5 Normal    Sensation:       grossly intact througout bilateral upper extremities    Lymphatics:        no edema noted in the upper extremities       Lumbar exam:    Inspection:  \"     no visible deformity in the low back       normal skin\",    ROM:       full flexion       full extension    Tender:       paraspinal muscles    Non Tender:       remainder of lumbar spine    Strength:       hip flexion 5/5 Normal       knee extension 5/5 Normal       ankle dorsiflexion 5/5 Normal       ankle plantarflexion 5/5 Normal       dorsiflexion of the great toe 5/5 Normal    Sensation:      grossly intact throughout lower " extremities    Special tests:  Fabere - Right positive, Yeoman's - Right negative and Left negative, Stepan - Right negative and Left negative and Ely's - Right negative and Left negative    Segmental spinal dysfunction/restrictions found at:  C6 , C7 , T1 , T2 , L4 , L5 , PSIS Right  and Extra-spinal: R Scapula    The following soft tissue hypotonicities were observed:  Piriformis: right, ache, dull pain and stiff, referred pain: no  Traps: bilateral, ache, dull pain and stiff, referred pain: no    Trigger points were found in:  Lumbar erector spine, Piriformis and Traps    Muscle spasm found in:  Lumbar erector spine, Piriformis and Traps      Radiology:      Assessment:    1. Segmental dysfunction of pelvic region    2. Chronic bilateral low back pain without sciatica    3. Segmental dysfunction of lumbar region    4. Spasm of muscle    5. Thoracic segment dysfunction    6. Cervicalgia    7. Cervical segment dysfunction    8. Somatic dysfunction of upper extremities    9. Shoulder stiffness, right        RX ordered/plan of care  Anticipated outcomes  Possible risks and side effects    After discussing the risk and benefits of care, patient consented to treatment    Prognosis: Good      Patient's condition:  Patient had restrictions pre-manipulation    Treatment effectiveness:  Post manipulation there is better intersegmental movement and Patient claims to feel looser post manipulation      Plan:    Procedures:  Evaluation and Management  03846 Moderate level exam 30 min    CMT:  70707 Chiropractic manipulative treatment 3-4 regions performed   29652 Chiropractic manipulative treatment extraspinal dysfunction/restriction  Cervical: Diversified, C6, C7 , Prone  Thoracic: Diversified, T1, T2, Prone  Lumbar: Activator, L4, L5, Prone  Pelvis: Drop Table, PSIS Right , Prone  Extra-spinal: Drop Table, R Scapula, Prone    Modalities:  57544: MSTM:  To Lumbar erector spine, Piriformis and Traps  for 5 min    Therapeutic  procedures:  99187: Therapeutic Exercises  Direct one-on-one treatment to develop flexibilty, range of motion, strength and endurance.   The following were demonstrated and practiced:   Stretches - Reviewed stretches today for a minimum of 8 minutes  Crossed leg piriformis stretch supine  Scapula retraction seated  Cervical retraction seated    Response to Treatment  Reduction in symptoms as reported by patient    Treatment plan and goals:  Goals:  SITTING: the patient specific goal is to attain pre-injury status of  2 hours comfortably  WALKING: the patient would like to be able to walk comfortably for 15 min.    Frequency of care  Duration of care is estimated to be 8 weeks, from the initial treatment.  It is estimated that the patient will need a total of 8 visits to resolve this episode.  For the initial therapeutic trial of care, the frequency is recommended at 1 X week, once daily.  A reevaluation would be clinically appropriate in 8 visits, to determine progress and further course of care.    In-Office Treatment  Evaluation  Spinal Chiropractic Manipulative Therapy:    Extra-Spinal Chiropractic Manipulative Therapy:    Modalities: MSTM  Therapeutic exercises:  Stretches    Recommendations:    Instructions:  stretch as instructed  and ice 20 minutes every other hour as needed    Follow-up:    Return to care in one week       Disclaimer: This note consists of symbols derived from keyboarding, dictation and/or voice recognition software. As a result, there may be errors in the script that have gone undetected. Please consider this when interpreting information found in this chart.

## 2020-09-08 NOTE — ED NOTES
Bed: ED09  Expected date:   Expected time:   Means of arrival:   Comments:  Hold for rapid response

## 2020-09-08 NOTE — ED PROVIDER NOTES
"  History     Chief Complaint   Patient presents with     Allergic Reaction     rapid response to allergy clinic; resuming allergy shots (paused due to covid), had reaction, given epi SQ 0.3 x2 and 10 zyrtec     HPI  Maddy Ortiz is a 34 year old female with history of environmental allergies (currently receiving allergy shots), schizoaffective disorder, anxiety, allergy induced asthma (albuterol as needed), who presents from the allergy clinic concern for severe allergic reaction.  Patient reports that she has seasonal allergies as well as dust and animal and has been getting allergy shots for some time.  However, given the current pandemic she had a break in her treatment.  She was now back to receiving therapies and building back up to her maintenance dose.  While at the clinic, after her dose, she developed wheezing, pruritus, and a sensation of tightness in her throat.  No nausea, vomiting, abdominal cramping, or diarrhea.  She denies any hospitalizations related to her asthma and says that hers is \"exercise-induced\".  States uses an albuterol inhaler maybe 4-5 times per month.    Rapid response was called from the allergy clinic.  Per nursing report, she received 0.3 mg IM epinephrine x2 as well as 10 mg of cetirizine.        The patient's PMHx, Surgical Hx, Allergies, and Medications were all reviewed with the patient.    Allergies:  Allergies   Allergen Reactions     Animal Dander      Other reaction(s): *Unknown     Metformin Other (See Comments)     fatigue     Mold      Trees        Problem List:    Patient Active Problem List    Diagnosis Date Noted     Obesity (BMI 35.0-39.9) with comorbidity (H) 01/02/2019     Priority: Medium     Paranoia (psychosis) (H) 08/24/2018     Priority: Medium     Gastroesophageal reflux disease without esophagitis 06/08/2018     Priority: Medium     Schizoaffective disorder, bipolar type (H) 05/07/2018     Priority: Medium     Encounter for IUD insertion 09/15/2017     " Priority: Medium     inserted 9/15/17 by Krista Keller MD    LOT: [id number]  Exp: 04/20       Seasonal allergic rhinitis due to pollen 11/04/2016     Priority: Medium     Allergic rhinitis due to mold 11/04/2016     Priority: Medium     Allergic rhinitis due to animal dander 11/04/2016     Priority: Medium     Allergic rhinitis due to dust mite 11/04/2016     Priority: Medium     Depression with anxiety 01/26/2015     Priority: Medium     Insomnia 07/18/2013     Priority: Medium     Bipolar 1 disorder (H) 12/06/2012     Priority: Medium     Planning on seeing Purvi (psychiatric nurse)       Paranoid type delusional disorder (H) 11/14/2012     Priority: Medium     Psychosis (H) 10/16/2012     Priority: Medium     Animal dander allergy 05/09/2012     Priority: Medium     Dog and Cat       CARDIOVASCULAR SCREENING; LDL GOAL LESS THAN 160 05/09/2012     Priority: Medium        Past Medical History:    Past Medical History:   Diagnosis Date     Bipolar 1 disorder (H) 12/6/2012     Depressive disorder      Paranoid type delusional disorder (H) 11/14/2012       Past Surgical History:    Past Surgical History:   Procedure Laterality Date     TONSILLECTOMY & ADENOIDECTOMY      as a child       Family History:    Family History   Adopted: Yes   Problem Relation Age of Onset     Unknown/Adopted Mother      Unknown/Adopted Father      Unknown/Adopted Other        Social History:  Marital Status:  Single [1]  Social History     Tobacco Use     Smoking status: Never Smoker     Smokeless tobacco: Never Used     Tobacco comment: around 2nd hand smoke   Substance Use Topics     Alcohol use: Not Currently     Comment: rare wine ( very rare)     Drug use: No        Medications:    dexamethasone (DECADRON) 4 MG tablet  benztropine (COGENTIN) 0.5 MG tablet  EPINEPHrine (EPIPEN/ADRENACLICK/OR ANY BX GENERIC EQUIV) 0.3 MG/0.3ML injection 2-pack  hydrOXYzine (ATARAX) 10 MG tablet  hydrOXYzine (ATARAX) 10 MG tablet  levonorgestrel  "(MIRENA) 20 MCG/24HR IUD  Melatonin 10 MG TABS tablet  mometasone (NASONEX) 50 MCG/ACT nasal spray  ORDER FOR ALLERGEN IMMUNOTHERAPY  ORDER FOR ALLERGEN IMMUNOTHERAPY  ORDER FOR ALLERGEN IMMUNOTHERAPY  ORDER FOR ALLERGEN IMMUNOTHERAPY  Pseudoephedrine HCl (SUDAFED PO)  sertraline (ZOLOFT) 50 MG tablet  VRAYLAR 4.5 MG CAPS capsule          Review of Systems   Constitutional: Negative for chills, diaphoresis and fever.   HENT: Negative for trouble swallowing and voice change.    Eyes: Negative for visual disturbance.   Respiratory: Positive for shortness of breath and wheezing.    Cardiovascular: Negative for chest pain.   Gastrointestinal: Negative for abdominal pain, diarrhea, nausea and vomiting.   Genitourinary: Positive for menstrual problem.   Musculoskeletal: Positive for back pain.   Neurological: Negative for light-headedness and headaches.   Psychiatric/Behavioral: The patient is not nervous/anxious.        Physical Exam   BP: (!) 155/108  Pulse: 115  Temp: 97.8  F (36.6  C)  Resp: 20  Height: 160 cm (5' 3\")  Weight: 99.8 kg (220 lb)  SpO2: 98 %    Physical Exam  GEN: Awake, alert, and cooperative.  Appears distressed  HENT: MMM. External ears and nose normal bilaterally.  EYES: EOM intact. Conjunctiva clear. No discharge. Bilateral periorbital edema.   NECK: Supple, symmetric. Non tender  CV : Tachycardic rate, regular rhythm. No murmurs appreciated.   PULM: Normal effort. No wheezes, rales, or rhonchi bilaterally. No prolongation of expiratory phase.   ABD: Soft, non-tender, non-distended. No rebound or guarding.   NEURO: Normal speech. Following commands. CN II-XII grossly intact. Answering questions and interacting appropriately.   EXT: No gross deformity. Warm and well perfused  INT: few symmetric urticaria on upper and lower extremities.        ED Course        Procedures           Critical Care time:  was 35 minutes for this patient excluding procedures.               No results found for this or any " previous visit (from the past 24 hour(s)).    Medications   famotidine (PEPCID) infusion 20 mg (0 mg Intravenous Stopped 9/8/20 1753)   methylPREDNISolone sodium succinate (solu-MEDROL) injection 125 mg (125 mg Intravenous Given 9/8/20 1650)   0.9% sodium chloride BOLUS (1,000 mLs Intravenous New Bag 9/8/20 1649)       Assessments & Plan (with Medical Decision Making)   34 year old female with past medical history of exercise induced asthma, psyzhoaffective disorder, environmental allergies who presents from the allergy clinic for severe allergic reaction likely related to allergy injections.  On arrival to Emergency Department, vital signs were blood pressure 155/108, temperature 97.8, pulse 115, respiratory rate 20, SPO2 98% on room air.    Patient received 0.3 mg IM epinephrine x2 as well as 10 mg of Zyrtec prior to arrival in the emergency department.  On exam she had no wheezing, good air movement bilaterally, no prolongation of expiratory phase, periorbital edema bilaterally no lingual or periorbital swelling, and few scattered urticaria.  She was given on 125 mg IV Solu-Medrol, 1 L of normal saline, and 20 mg IV famotidine.    On reevaluation she had resolution of her urticaria and was feeling much improved.  She was observed emergency department for just over 2 hours.  She has EpiPen (2 pack) on her person.  Prescription for dexamethasone sent to preferred pharmacy.  Follow-up and ED return precautions discussed.  She expresses agreement understanding of plan and was discharged in improved condition.    I have reviewed the nursing notes.         New Prescriptions    DEXAMETHASONE (DECADRON) 4 MG TABLET    Take 2.5 tablets (10 mg) by mouth once for 1 dose       Final diagnoses:   Acute anaphylaxis, initial encounter     Rafael Choudhary MD    9/8/2020   [facility] EMERGENCY DEPARTMENT    Disclaimer: This note consists of words and symbols derived from keyboarding and dictation using voice recognition  software.  As a result, there may be errors that have gone undetected.  Please consider this when interpreting information found in this note.             Rafael Choudhary MD  09/08/20 0884

## 2020-09-08 NOTE — PROGRESS NOTES
Patient received allergy immunotherapy. Shortly after injection noted to sneeze x2. Writer noticed patient to be itching palms and having watery eyes. Patient brought to exam room. Vitals obtained, zyrtec given, Dr. Shin informed. RRT called after first epi given. RRT given report by Dr. Shin. Patient monitored completely during reaction. See sx below. Patient stable upon discharge to ED. To follow-up with provider to discuss further treatment and dosing prior to next appointment.     SYSTEMIC REACTION/ ANAPHYLAXIS TREATMENT RECORD    Prior systemic reaction: No    History of asthma: No    Date/time of injection(s):    1553    Date/time of reaction:    1605    Allergy injections given today:  C;D;DM 1:1 Red 0.5ml RENETTA  M 1:1 Red 0.5ml LLA  T 1:1 Red 0.5ml BASIL  W 1:1 Red 0.5 ml RLA         SYMPTOMS:    RESPIRATORY:  SHORTNESS OF BREATH   WHEEZING               SKIN:   HIVES   ANGIOEDEMA   GENERALIZED ITCH       EYE/ NASAL:  CONGESTION   SNEEZING RED and WATERY EYES         Vitals:    09/08/20 1609   BP: (!) 137/93   BP Location: Left arm   Patient Position: Sitting   Cuff Size: Adult Large   Pulse: 119   Resp: 22   SpO2: 95%         MEDICATIONS ADMINISTERED:    MEDICATION: Zyrtec   ROUTE: PO  DOSE: 20 mg  LOT #: [id number]  :  [employer]  EXPIRATION DATE:  09/2021  NDC#: 1082-3587-67    MEDICATION: Epinephrine  ROUTE: IM  SITE: Vastus Lateralis - Left  DOSE: 0.3mg  LOT #: [id number]  :  [employer]  EXPIRATION DATE:  Feb 2021  NDC#: 58121 [id number]    MEDICATION: Epinephrine  ROUTE: IM  SITE: Vastus Lateralis - Right  DOSE: 0.3mg  LOT #: [id number]  :  [employer]  EXPIRATION DATE:  Feb 2021  NDC#: 74685 [id number]    Time of discharge:    1622    Condition upon release:    Stable    Patient instructions:     Yes prior to transfer to ED by Dr. Aleida CINTRON RN   Specialty Clinics

## 2020-09-08 NOTE — LETTER
9/8/2020         RE: Maddy Ortiz  [address]  [facility] [address]        Dear Colleague,    Thank you for referring your patient, Maddy Ortiz, to the [facility]. Please see a copy of my visit note below.    SUBJECTIVE:                                                                   Maddy Ortiz is a 34-year-old female with allergic rhinitis, seen for allergic reaction.      Percutaneous skin puncture testing for aeroallergens performed in November 2016 showed sensitivity to dog, cat, dust mite, molds, pollen of trees, grass, and weeds. In spring-summer 2018, she had a buildup using cluster schedule for allergen immunotherapy.  She reached maintenance dose in July 2018.    Allergy Immunotherapy  Date/time of injection(s): 9/8/2020   Vial Color                               Content                                  Dose  Red 1:1                                   Cat, Dog, Dust Mite                0.5mL  Red 1:1                                   Molds                                       0.5mL    Red 1:1                                   Trees                                       0.5mL  Red 1:1                                   Weeds                                     0.5mL    Was notified by allergy RN that 10 minutes after receiving the injections, she developed sneezing, itchy palms and soles, and watery eyes.  She was given cetirizine 10 mg by mouth.    Patient Active Problem List   Diagnosis     Animal dander allergy     CARDIOVASCULAR SCREENING; LDL GOAL LESS THAN 160     Psychosis (H)     Paranoid type delusional disorder (H)     Bipolar 1 disorder (H)     Insomnia     Depression with anxiety     Seasonal allergic rhinitis due to pollen     Allergic rhinitis due to mold     Allergic rhinitis due to animal dander     Allergic rhinitis due to dust mite     Encounter for IUD insertion     Schizoaffective disorder, bipolar type (H)     Gastroesophageal reflux disease  without esophagitis     Paranoia (psychosis) (H)     Obesity (BMI 35.0-39.9) with comorbidity (H)       Past Medical History:   Diagnosis Date     Bipolar 1 disorder (H) 12/6/2012     Depressive disorder      Paranoid type delusional disorder (H) 11/14/2012      *Patient is Adopted       Problem (# of Occurrences) Relation (Name,Age of Onset)    Unknown/Adopted (3) Mother, Father, Other        Past Surgical History:   Procedure Laterality Date     TONSILLECTOMY & ADENOIDECTOMY      as a child     Social History     Socioeconomic History     Marital status: Single     Spouse name: Not on file     Number of children: Not on file     Years of education: Not on file     Highest education level: Not on file   Occupational History     Not on file   Social Needs     Financial resource strain: Not on file     Food insecurity     Worry: Not on file     Inability: Not on file     Transportation needs     Medical: Not on file     Non-medical: Not on file   Tobacco Use     Smoking status: Never Smoker     Smokeless tobacco: Never Used     Tobacco comment: around 2nd hand smoke   Substance and Sexual Activity     Alcohol use: Not Currently     Comment: rare wine ( very rare)     Drug use: No     Sexual activity: Not Currently     Partners: Male     Birth control/protection: I.U.D.   Lifestyle     Physical activity     Days per week: Not on file     Minutes per session: Not on file     Stress: Not on file   Relationships     Social connections     Talks on phone: Not on file     Gets together: Not on file     Attends Church service: Not on file     Active member of club or organization: Not on file     Attends meetings of clubs or organizations: Not on file     Relationship status: Not on file     Intimate partner violence     Fear of current or ex partner: Not on file     Emotionally abused: Not on file     Physically abused: Not on file     Forced sexual activity: Not on file   Other Topics Concern     Parent/sibling w/  CABG, MI or angioplasty before 65F 55M? No     Comment: patient was adopted; unknown family history   Social History Narrative    One child    Education: some college        May 29, 2020    ENVIRONMENTAL HISTORY: [place] The home is heated with a electric furnace. They do not have central air conditioning, does have box air conditioner in the wall and has air purifier. The patient's bedroom is furnished with stuffed animals in bed, carpeting in bedroom and fabric window coverings. No pets. There is history of cockroach or mice infestation. There are no smokers in the house.  The apartment does not have a basement.            Review of Systems        Current Outpatient Medications:      benztropine (COGENTIN) 0.5 MG tablet, as needed, Disp: , Rfl:      dexamethasone (DECADRON) 4 MG tablet, Take 2.5 tablets (10 mg) by mouth once for 1 dose, Disp: 3 tablet, Rfl: 0     EPINEPHrine (EPIPEN/ADRENACLICK/OR ANY BX GENERIC EQUIV) 0.3 MG/0.3ML injection 2-pack, Inject 0.3 mLs (0.3 mg) into the muscle as needed for anaphylaxis, Disp: 0.6 mL, Rfl: 3     hydrOXYzine (ATARAX) 10 MG tablet, as needed, Disp: , Rfl:      hydrOXYzine (ATARAX) 10 MG tablet, Take 1 tablet (10 mg) by mouth daily as needed for itching (nasal al,lergy symptoms), Disp: 90 tablet, Rfl: 1     levonorgestrel (MIRENA) 20 MCG/24HR IUD, 1 each (20 mcg) by Intrauterine route continuous, Disp: , Rfl:      Melatonin 10 MG TABS tablet, Take 10 mg by mouth nightly as needed for sleep, Disp: , Rfl:      mometasone (NASONEX) 50 MCG/ACT nasal spray, Spray 2 sprays into both nostrils daily, Disp: 17 g, Rfl: 6     ORDER FOR ALLERGEN IMMUNOTHERAPY, Cat Hair, Standardized 10,000 BAU/mL, ALK  3.0 ml Dog Hair Dander, A. P.  1:100 w/v, HS  1.0 ml Dust Mites F 30,000AU/mL, HS  0.5 ml Dust Mites P. 30,000 AU/mL, HS  0.5 ml  Diluent: HSA qs to 5ml, Disp: 5 mL, Rfl: PRN     ORDER FOR ALLERGEN IMMUNOTHERAPY, Alternaria Tenuis  1:10 w/v, HS  0.5 ml Epicoccum Nigrum 1:10 w/v, HS 0.5 ml Hormodendrum Cladosporioides 1:10 w/v, HS 0.5 ml Diluent: HSA qs to 5ml, Disp: 5 mL, Rfl: PRN     ORDER FOR ALLERGEN IMMUNOTHERAPY, Estuardo, White  1:20 w/v, HS  0.5 ml Birch Mix PRW 1:20 w/v, HS  0.5 ml Elm, American 1:20 w/v, HS  0.5 ml Hackberry 1:20 w/v, HS 0.5 ml Hickory, Shagbark 1:20 w/v, HS  0.5 ml Siloam Mix RW 1:20 w/v, HS 0.5 ml Oak Mix RVW 1:20 w/v, HS 0.5 ml [place] Tree, Black 1:20 w/v, HS 0.5 ml [place], Black 1:20 w/v, HS 0.5 ml Diluent: HSA qs to 5ml, Disp: 5 mL, Rfl: PRN     ORDER FOR ALLERGEN IMMUNOTHERAPY, Kochia 1:20 w/v, HS 1.0 ml Nettle 1:20 w/v, HS 1.0 ml Plantain, English 1:20 w/v, HS 1.0 ml Ragweed Mixed 1:20 w/v ALK  0.6 ml Russian Thistle 1:20 w/v, HS 1.0 ml Diluent: HSA qs to 5ml, Disp: 5 mL, Rfl: PRN     Pseudoephedrine HCl (SUDAFED PO), Take by mouth as needed for congestion, Disp: , Rfl:      sertraline (ZOLOFT) 50 MG tablet, daily 150mg daily, Disp: , Rfl:      VRAYLAR 4.5 MG CAPS capsule, TK 1 C PO D, Disp: , Rfl:   No current facility-administered medications for this visit.   Immunization History   Administered Date(s) Administered     DTAP (<7y) 03/01/1985, 06/01/1985, 07/01/1985, 07/01/1986, 07/26/1990     HPV Quadrivalent 12/12/2008, 09/06/2011     Hep B, Peds or Adolescent 02/16/1998     Historical DTP/aP 03/01/1985, 06/01/1985, 07/01/1985, 07/01/1986, 07/26/1990     Historical Hepb 02/16/1998     Influenza (IIV3) PF 12/09/2008, 12/06/2012     Influenza Vaccine IM > 6 months Valent IIV4 11/21/2017, 01/03/2019, 09/17/2019     MMR 02/27/1986, 07/17/1997     Poliovirus, inactivated (IPV) 06/01/1985, 07/01/1985, 01/01/1987, 07/26/1990     TDAP Vaccine (Adacel) 06/30/1997, 03/31/2008, 05/14/2010, 12/06/2012     Td (Adult), Adsorbed 06/30/1997     Allergies   Allergen Reactions     Animal Dander      Other reaction(s): *Unknown     Metformin Other (See Comments)     fatigue     Mold      Trees      OBJECTIVE:                                                                  Vital Signs 9/8/2020   Systolic 137   Diastolic 93   Pulse 119   Temperature    Respirations 22   Weight (LB)    Height    BMI (Calculated)    Pain    O2 95     On my exam, O2 saturation was varying between 90 to 95%.    Physical Exam  Vitals signs and nursing note reviewed.   Constitutional:       General: She is not in acute distress.     Appearance: She is not diaphoretic.   HENT:      Head: Normocephalic and atraumatic.      Right Ear: Tympanic membrane, ear canal and external ear normal.      Left Ear: Tympanic membrane, ear canal and external ear normal.      Nose: Septal deviation (mild, to the right) present. No mucosal edema or rhinorrhea.      Mouth/Throat:      Mouth: Mucous membranes are moist.      Pharynx: Oropharynx is clear. No oropharyngeal exudate.   Eyes:      General:         Right eye: Discharge present.         Left eye: Discharge present.     Conjunctiva/sclera:      Right eye: Right conjunctiva is injected.      Left eye: Left conjunctiva is injected.      Comments: Periorbital edema bilaterally   Neck:      Musculoskeletal: Normal range of motion.   Cardiovascular:      Rate and Rhythm: Normal rate and regular rhythm.      Heart sounds: Normal heart sounds. No murmur.   Pulmonary:      Effort: No respiratory distress.      Breath sounds: Wheezing (Scattered wheezing bilaterally) present. No rales.   Musculoskeletal: Normal range of motion.   Lymphadenopathy:      Cervical: No cervical adenopathy.   Skin:     General: Skin is warm.      Comments: Multiple urticarial lesions on torso   Neurological:      Mental Status: She is alert and oriented to person, place, and time.   Psychiatric:         Mood and Affect: Mood normal.         Behavior: Behavior normal.           ASSESSMENT/PLAN:    Anaphylaxis due to allergen immunotherapy     I instructed the nurse to give the patient epinephrine 0.3 mg intramuscularly and another dose of cetirizine 10 mg by  mouth.  5 minutes later, she reported worsening in breathing.  On auscultation, wheezing progressed.  Another dose of epinephrine 0.3 mg intramuscularly was administered.  The patient reported improvement in breathing.  Rapid response team was called and the patient was escorted for observation to the ED.    In the past, she found allergen immunotherapy helpful.  The prescription of allergen immunotherapy contains allergens on a higher limit of suggested effective dosages.  In the future, I am planning to drop the maintenance dose to read 1: 1, 0.2 mL and make it a new maintenance dose.  Even with that drop in the dose, she still should get appropriate amount of extracts to control her rhinitis symptoms  Thank you for allowing us to participate in the care of this patient. Please feel free to contact us if there are any questions or concerns about the patient.    Disclaimer: This note consists of symbols derived from keyboarding, dictation and/or voice recognition software. As a result, there may be errors in the script that have gone undetected. Please consider this when interpreting information found in this chart.    Rudy Shin MD, FAAAAI, FACJERICAI  Allergy, Asthma and Immunology    [facility]    Again, thank you for allowing me to participate in the care of your patient.        Sincerely,        Rudy Shin MD

## 2020-09-08 NOTE — ED TRIAGE NOTES
rapid response to allergy clinic; resuming allergy shots (paused due to covid), had reaction, given epi SQ 0.3 x2 and 10 zyrtec

## 2020-09-08 NOTE — DISCHARGE INSTRUCTIONS
You were treated in the Emergency Department for a severe allergic reaction. You may take zyrtec one daily and pepcid twice daily as needed for rash and itchy skin for up to 5 days. Take 10 mg of dexamethasone once tomorrow. Follow up with the allergy clinic. If you develop difficulty breathing, tightness in your throat, or other new or concerning symptoms, please return to the emergency department immediately for further evaluation and treatment.

## 2020-09-08 NOTE — ED AVS SNAPSHOT
[facility] Emergency Department  [address] [address]  Phone:  [phone]  Fax:  [phone]                                    Maddy Ortiz   MRN: [id number]    Department:  [facility] Emergency Department   Date of Visit:  9/8/2020           After Visit Summary Signature Page    I have received my discharge instructions, and my questions have been answered. I have discussed any challenges I see with this plan with the nurse or doctor.    ..........................................................................................................................................  Patient/Patient Representative Signature      ..........................................................................................................................................  Patient Representative Print Name and Relationship to Patient    ..................................................               ................................................  Date                                   Time    ..........................................................................................................................................  Reviewed by Signature/Title    ...................................................              ..............................................  Date                                               Time          22EPIC Rev 08/18

## 2020-09-09 NOTE — PROGRESS NOTES
SUBJECTIVE:                                                                   Maddy Ortiz is a 34-year-old female with allergic rhinitis, seen for allergic reaction.      Percutaneous skin puncture testing for aeroallergens performed in November 2016 showed sensitivity to dog, cat, dust mite, molds, pollen of trees, grass, and weeds. In spring-summer 2018, she had a buildup using cluster schedule for allergen immunotherapy.  She reached maintenance dose in July 2018.    Allergy Immunotherapy  Date/time of injection(s): 9/8/2020   Vial Color                               Content                                  Dose  Red 1:1                                   Cat, Dog, Dust Mite                0.5mL  Red 1:1                                   Molds                                       0.5mL    Red 1:1                                   Trees                                       0.5mL  Red 1:1                                   Weeds                                     0.5mL    Was notified by allergy RN that 10 minutes after receiving the injections, she developed sneezing, itchy palms and soles, and watery eyes.  She was given cetirizine 10 mg by mouth.    Patient Active Problem List   Diagnosis     Animal dander allergy     CARDIOVASCULAR SCREENING; LDL GOAL LESS THAN 160     Psychosis (H)     Paranoid type delusional disorder (H)     Bipolar 1 disorder (H)     Insomnia     Depression with anxiety     Seasonal allergic rhinitis due to pollen     Allergic rhinitis due to mold     Allergic rhinitis due to animal dander     Allergic rhinitis due to dust mite     Encounter for IUD insertion     Schizoaffective disorder, bipolar type (H)     Gastroesophageal reflux disease without esophagitis     Paranoia (psychosis) (H)     Obesity (BMI 35.0-39.9) with comorbidity (H)       Past Medical History:   Diagnosis Date     Bipolar 1 disorder (H) 12/6/2012     Depressive disorder      Paranoid type delusional disorder (H)  11/14/2012      *Patient is Adopted       Problem (# of Occurrences) Relation (Name,Age of Onset)    Unknown/Adopted (3) Mother, Father, Other        Past Surgical History:   Procedure Laterality Date     TONSILLECTOMY & ADENOIDECTOMY      as a child     Social History     Socioeconomic History     Marital status: Single     Spouse name: Not on file     Number of children: Not on file     Years of education: Not on file     Highest education level: Not on file   Occupational History     Not on file   Social Needs     Financial resource strain: Not on file     Food insecurity     Worry: Not on file     Inability: Not on file     Transportation needs     Medical: Not on file     Non-medical: Not on file   Tobacco Use     Smoking status: Never Smoker     Smokeless tobacco: Never Used     Tobacco comment: around 2nd hand smoke   Substance and Sexual Activity     Alcohol use: Not Currently     Comment: rare wine ( very rare)     Drug use: No     Sexual activity: Not Currently     Partners: Male     Birth control/protection: I.U.D.   Lifestyle     Physical activity     Days per week: Not on file     Minutes per session: Not on file     Stress: Not on file   Relationships     Social connections     Talks on phone: Not on file     Gets together: Not on file     Attends Caodaism service: Not on file     Active member of club or organization: Not on file     Attends meetings of clubs or organizations: Not on file     Relationship status: Not on file     Intimate partner violence     Fear of current or ex partner: Not on file     Emotionally abused: Not on file     Physically abused: Not on file     Forced sexual activity: Not on file   Other Topics Concern     Parent/sibling w/ CABG, MI or angioplasty before 65F 55M? No     Comment: patient was adopted; unknown family history   Social History Narrative    One child    Education: some college        May 29, 2020    ENVIRONMENTAL HISTORY: [place] The home is heated with a electric furnace. They do not have central air conditioning, does have box air conditioner in the wall and has air purifier. The patient's bedroom is furnished with stuffed animals in bed, carpeting in bedroom and fabric window coverings. No pets. There is history of cockroach or mice infestation. There are no smokers in the house.  The apartment does not have a basement.            Review of Systems        Current Outpatient Medications:      benztropine (COGENTIN) 0.5 MG tablet, as needed, Disp: , Rfl:      dexamethasone (DECADRON) 4 MG tablet, Take 2.5 tablets (10 mg) by mouth once for 1 dose, Disp: 3 tablet, Rfl: 0     EPINEPHrine (EPIPEN/ADRENACLICK/OR ANY BX GENERIC EQUIV) 0.3 MG/0.3ML injection 2-pack, Inject 0.3 mLs (0.3 mg) into the muscle as needed for anaphylaxis, Disp: 0.6 mL, Rfl: 3     hydrOXYzine (ATARAX) 10 MG tablet, as needed, Disp: , Rfl:      hydrOXYzine (ATARAX) 10 MG tablet, Take 1 tablet (10 mg) by mouth daily as needed for itching (nasal al,lergy symptoms), Disp: 90 tablet, Rfl: 1     levonorgestrel (MIRENA) 20 MCG/24HR IUD, 1 each (20 mcg) by Intrauterine route continuous, Disp: , Rfl:      Melatonin 10 MG TABS tablet, Take 10 mg by mouth nightly as needed for sleep, Disp: , Rfl:      mometasone (NASONEX) 50 MCG/ACT nasal spray, Spray 2 sprays into both nostrils daily, Disp: 17 g, Rfl: 6     ORDER FOR ALLERGEN IMMUNOTHERAPY, Cat Hair, Standardized 10,000 BAU/mL, ALK  3.0 ml Dog Hair Dander, A. P.  1:100 w/v, HS  1.0 ml Dust Mites F 30,000AU/mL, HS  0.5 ml Dust Mites P. 30,000 AU/mL, HS  0.5 ml  Diluent: HSA qs to 5ml, Disp: 5 mL, Rfl: PRN     ORDER FOR ALLERGEN IMMUNOTHERAPY, Alternaria Tenuis 1:10 w/v, HS  0.5 ml Epicoccum Nigrum 1:10 w/v, HS 0.5 ml Hormodendrum Cladosporioides 1:10 w/v, HS 0.5 ml Diluent: HSA qs to 5ml, Disp: 5 mL, Rfl: PRN     ORDER FOR ALLERGEN IMMUNOTHERAPY, Estuardo, White  1:20 w/v, HS  0.5 ml Birch Mix PRW 1:20 w/v, HS  0.5  ml Elm, American 1:20 w/v, HS  0.5 ml Hackberry 1:20 w/v, HS 0.5 ml Hickory, Shagbark 1:20 w/v, HS  0.5 ml [place] Mix RW 1:20 w/v, HS 0.5 ml Oak Mix RVW 1:20 w/v, HS 0.5 ml [place] Tree, Black 1:20 w/v, HS 0.5 ml Apex, Black 1:20 w/v, HS 0.5 ml Diluent: HSA qs to 5ml, Disp: 5 mL, Rfl: PRN     ORDER FOR ALLERGEN IMMUNOTHERAPY, Kochia 1:20 w/v, HS 1.0 ml Nettle 1:20 w/v, HS 1.0 ml Plantain, English 1:20 w/v, HS 1.0 ml Ragweed Mixed 1:20 w/v ALK  0.6 ml Russian Thistle 1:20 w/v, HS 1.0 ml Diluent: HSA qs to 5ml, Disp: 5 mL, Rfl: PRN     Pseudoephedrine HCl (SUDAFED PO), Take by mouth as needed for congestion, Disp: , Rfl:      sertraline (ZOLOFT) 50 MG tablet, daily 150mg daily, Disp: , Rfl:      VRAYLAR 4.5 MG CAPS capsule, TK 1 C PO D, Disp: , Rfl:   No current facility-administered medications for this visit.   Immunization History   Administered Date(s) Administered     DTAP (<7y) 03/01/1985, 06/01/1985, 07/01/1985, 07/01/1986, 07/26/1990     HPV Quadrivalent 12/12/2008, 09/06/2011     Hep B, Peds or Adolescent 02/16/1998     Historical DTP/aP 03/01/1985, 06/01/1985, 07/01/1985, 07/01/1986, 07/26/1990     Historical Hepb 02/16/1998     Influenza (IIV3) PF 12/09/2008, 12/06/2012     Influenza Vaccine IM > 6 months Valent IIV4 11/21/2017, 01/03/2019, 09/17/2019     MMR 02/27/1986, 07/17/1997     Poliovirus, inactivated (IPV) 06/01/1985, 07/01/1985, 01/01/1987, 07/26/1990     TDAP Vaccine (Adacel) 06/30/1997, 03/31/2008, 05/14/2010, 12/06/2012     Td (Adult), Adsorbed 06/30/1997     Allergies   Allergen Reactions     Animal Dander      Other reaction(s): *Unknown     Metformin Other (See Comments)     fatigue     Mold      Trees      OBJECTIVE:                                                                 Vital Signs 9/8/2020   Systolic 137   Diastolic 93   Pulse 119   Temperature    Respirations 22   Weight (LB)    Height    BMI (Calculated)    Pain    O2 95     On my exam, O2 saturation was varying between 90  to 95%.    Physical Exam  Vitals signs and nursing note reviewed.   Constitutional:       General: She is not in acute distress.     Appearance: She is not diaphoretic.   HENT:      Head: Normocephalic and atraumatic.      Right Ear: Tympanic membrane, ear canal and external ear normal.      Left Ear: Tympanic membrane, ear canal and external ear normal.      Nose: Septal deviation (mild, to the right) present. No mucosal edema or rhinorrhea.      Mouth/Throat:      Mouth: Mucous membranes are moist.      Pharynx: Oropharynx is clear. No oropharyngeal exudate.   Eyes:      General:         Right eye: Discharge present.         Left eye: Discharge present.     Conjunctiva/sclera:      Right eye: Right conjunctiva is injected.      Left eye: Left conjunctiva is injected.      Comments: Periorbital edema bilaterally   Neck:      Musculoskeletal: Normal range of motion.   Cardiovascular:      Rate and Rhythm: Normal rate and regular rhythm.      Heart sounds: Normal heart sounds. No murmur.   Pulmonary:      Effort: No respiratory distress.      Breath sounds: Wheezing (Scattered wheezing bilaterally) present. No rales.   Musculoskeletal: Normal range of motion.   Lymphadenopathy:      Cervical: No cervical adenopathy.   Skin:     General: Skin is warm.      Comments: Multiple urticarial lesions on torso   Neurological:      Mental Status: She is alert and oriented to person, place, and time.   Psychiatric:         Mood and Affect: Mood normal.         Behavior: Behavior normal.           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  5 minutes later, she reported worsening in breathing.  On auscultation, wheezing progressed.  Another dose of epinephrine 0.3 mg intramuscularly was administered.  The patient reported improvement in breathing.  Rapid response team was called and  the patient was escorted for observation to the ED.    In the past, she found allergen immunotherapy helpful.  The prescription of allergen immunotherapy contains allergens on a higher limit of suggested effective dosages.  In the future, I am planning to drop the maintenance dose to read 1: 1, 0.2 mL and make it a new maintenance dose.  Even with that drop in the dose, she still should get appropriate amount of extracts to control her rhinitis symptoms  Thank you for allowing us to participate in the care of this patient. Please feel free to contact us if there are any questions or concerns about the patient.    Disclaimer: This note consists of symbols derived from keyboarding, dictation and/or voice recognition software. As a result, there may be errors in the script that have gone undetected. Please consider this when interpreting information found in this chart.    Rudy Shin MD, FAAAAI, FACAAI  Allergy, Asthma and Immunology    [facility]

## 2020-09-11 ENCOUNTER — OFFICE VISIT (OUTPATIENT)
Dept: FAMILY MEDICINE | Facility: CLINIC | Age: 35
End: 2020-09-11
Payer: COMMERCIAL

## 2020-09-11 VITALS
RESPIRATION RATE: 20 BRPM | TEMPERATURE: 97.8 F | SYSTOLIC BLOOD PRESSURE: 130 MMHG | WEIGHT: 215 LBS | DIASTOLIC BLOOD PRESSURE: 88 MMHG | HEART RATE: 104 BPM | BODY MASS INDEX: 38.09 KG/M2 | HEIGHT: 63 IN | OXYGEN SATURATION: 98 %

## 2020-09-11 DIAGNOSIS — N93.8 DUB (DYSFUNCTIONAL UTERINE BLEEDING): Primary | ICD-10-CM

## 2020-09-11 DIAGNOSIS — Z11.3 SCREEN FOR STD (SEXUALLY TRANSMITTED DISEASE): ICD-10-CM

## 2020-09-11 LAB — HCG UR QL: NEGATIVE

## 2020-09-11 PROCEDURE — 36415 COLL VENOUS BLD VENIPUNCTURE: CPT | Performed by: NURSE PRACTITIONER

## 2020-09-11 PROCEDURE — 99213 OFFICE O/P EST LOW 20 MIN: CPT | Performed by: NURSE PRACTITIONER

## 2020-09-11 PROCEDURE — 87389 HIV-1 AG W/HIV-1&-2 AB AG IA: CPT | Performed by: NURSE PRACTITIONER

## 2020-09-11 PROCEDURE — 87491 CHLMYD TRACH DNA AMP PROBE: CPT | Performed by: NURSE PRACTITIONER

## 2020-09-11 PROCEDURE — 87340 HEPATITIS B SURFACE AG IA: CPT | Performed by: NURSE PRACTITIONER

## 2020-09-11 PROCEDURE — 86706 HEP B SURFACE ANTIBODY: CPT | Performed by: NURSE PRACTITIONER

## 2020-09-11 PROCEDURE — 81025 URINE PREGNANCY TEST: CPT | Performed by: NURSE PRACTITIONER

## 2020-09-11 PROCEDURE — 86803 HEPATITIS C AB TEST: CPT | Performed by: NURSE PRACTITIONER

## 2020-09-11 PROCEDURE — 87591 N.GONORRHOEAE DNA AMP PROB: CPT | Performed by: NURSE PRACTITIONER

## 2020-09-11 PROCEDURE — 86780 TREPONEMA PALLIDUM: CPT | Performed by: NURSE PRACTITIONER

## 2020-09-11 RX ORDER — LEVONORGESTREL/ETHIN.ESTRADIOL 0.1-0.02MG
1 TABLET ORAL DAILY
Qty: 84 TABLET | Refills: 0 | Status: SHIPPED | OUTPATIENT
Start: 2020-09-11 | End: 2020-09-28

## 2020-09-11 ASSESSMENT — MIFFLIN-ST. JEOR: SCORE: 1639.36

## 2020-09-11 NOTE — PATIENT INSTRUCTIONS
Start birth control pills and continue for 3 months.  Then stop.  If breakthrough bleeding returns, follow up in clinic.          Thank you for choosing [facility].  You may be receiving an email and/or telephone survey request from [facility] Customer Experience regarding your visit today.  Please take a few minutes to respond to the survey to let us know how we are doing.      If you have questions or concerns, please contact us via [employer] or you can contact your care team at [phone].    Our Clinic hours are:  Monday 6:40 am  to 7:00 pm  Tuesday -Friday 6:40 am to 5:00 pm    The [place] outpatient lab hours are:  Monday - Friday 6:10 am to 4:45 pm  Saturdays 7:00 am to 11:00 am  Appointments are required, call [phone]    If you have clinical questions after hours or would like to schedule an appointment,  call the clinic at [phone].

## 2020-09-11 NOTE — PROGRESS NOTES
"Subjective     Maddy Ortiz is a 34 year old female who presents to clinic today for the following health issues:    HPI       Chief Complaint   Patient presents with     Patient Request     patient asking to go on the birth control pill;  is having a little break through bleeding with the IUD     Has had an IUD in place for three years  Hasn't always had bleeding - seems intermittent  Recently had a heavy period;  has been spotting ever since.  No cramping.  No blood clots.  Sexually active  New partner this last weekend    Recent TSH normal.  Recent CBC normal.  UPT 8/17 negative.    Nonsmoker          Review of Systems   Constitutional, HEENT, cardiovascular, pulmonary, gi and gu systems are negative, except as otherwise noted.      Objective    /88 (BP Location: Right arm)   Pulse 104   Temp 97.8  F (36.6  C) (Tympanic)   Resp 20   Ht 1.6 m (5' 3\")   Wt 97.5 kg (215 lb)   SpO2 98%   BMI 38.09 kg/m    Body mass index is 38.09 kg/m .  Physical Exam   GENERAL: healthy, alert and no distress  PSYCH: mentation appears normal and affect normal/bright    Results for orders placed or performed in visit on 09/11/20 (from the past 24 hour(s))   HCG Qual, Urine (EDC4297)   Result Value Ref Range    HCG Qual Urine Negative NEG^Negative           Assessment & Plan     DUB (dysfunctional uterine bleeding)  Breakthrough bleeding on the IUD.  Patient requesting to add OCP  UPT negative.    Add OCP for 3 months.  Follow up if bleeding returns.    - HCG Qual, Urine (COG1092)  - levonorgestrel-ethinyl estradiol (AVIANE) 0.1-20 MG-MCG tablet; Take 1 tablet by mouth daily      Screen for STD (sexually transmitted disease)  - NEISSERIA GONORRHOEA PCR  - CHLAMYDIA TRACHOMATIS PCR  - HIV Antigen Antibody Combo  - Treponema Abs w Reflex to RPR and Titer  - Hepatitis C antibody  - Hepatitis B surface antigen  - Hepatitis B Surface Antibody         Return in about 3 months (around 12/11/2020).    The risks, benefits and " treatment options of prescribed medications or other treatments have been discussed with the patient. The patient verbalized their understanding and should call or follow up if no improvement or if they develop further problems.    PHILL Luo [facility]

## 2020-09-12 LAB — T PALLIDUM AB SER QL: NONREACTIVE

## 2020-09-13 LAB
C TRACH DNA SPEC QL NAA+PROBE: NEGATIVE
HBV SURFACE AB SERPL IA-ACNC: 0.32 M[IU]/ML
HBV SURFACE AG SERPL QL IA: NONREACTIVE
HCV AB SERPL QL IA: NONREACTIVE
HIV 1+2 AB+HIV1 P24 AG SERPL QL IA: NONREACTIVE
N GONORRHOEA DNA SPEC QL NAA+PROBE: NEGATIVE
SPECIMEN SOURCE: NORMAL
SPECIMEN SOURCE: NORMAL

## 2020-09-15 ENCOUNTER — COMMUNICATION - HEALTHEAST (OUTPATIENT)
Dept: SURGERY | Facility: CLINIC | Age: 35
End: 2020-09-15

## 2020-09-16 ENCOUNTER — ALLIED HEALTH/NURSE VISIT (OUTPATIENT)
Dept: FAMILY MEDICINE | Facility: CLINIC | Age: 35
End: 2020-09-16
Payer: COMMERCIAL

## 2020-09-16 DIAGNOSIS — Z23 NEED FOR PROPHYLACTIC VACCINATION AND INOCULATION AGAINST INFLUENZA: Primary | ICD-10-CM

## 2020-09-16 PROCEDURE — 90686 IIV4 VACC NO PRSV 0.5 ML IM: CPT

## 2020-09-16 PROCEDURE — 99207 ZZC NO CHARGE NURSE ONLY: CPT

## 2020-09-16 PROCEDURE — 90471 IMMUNIZATION ADMIN: CPT

## 2020-09-17 ENCOUNTER — VIRTUAL VISIT (OUTPATIENT)
Dept: FAMILY MEDICINE | Facility: OTHER | Age: 35
End: 2020-09-17

## 2020-09-17 NOTE — PROGRESS NOTES
"Date: 2020 09:07:46  Clinician: Chapin Rayo  Clinician NPI: [id number]  Patient: Maddy Ortiz  Patient : 1985  Patient Address: [address] [place] [address]  Patient Phone: [phone]  Visit Protocol: URI  Patient Summary:  Maddy is a 34 year old ( : 1985 ) female who initiated a [facility] Visit for COVID-19 (Coronavirus) evaluation and screening. When asked the question \"Please sign me up to receive news, health information and promotions. \", Maddy responded \"Yes\".    When asked when her symptoms started, Maddy reported that she does not have any symptoms.   She denies taking antibiotic medication in the past month and having recent facial or sinus surgery in the past 60 days.    Pertinent COVID-19 (Coronavirus) information  In the past 14 days, Maddy has not worked in a congregate living setting.   She does not work or volunteer as healthcare worker or a  and does not work or volunteer in a healthcare facility.   Maddy also has not lived in a congregate living setting in the past 14 days. She does not live with a healthcare worker.   Maddy has had a close contact with a laboratory-confirmed COVID-19 patient in the last 14 days. Additional information about contact with COVID-19 (Coronavirus) patient as reported by the patient (free text): Matthew Alfonso who lives in [place] visited me as my guest in my apartment on -2020.   Patient reported they are not living in the same household with a COVID-19 positive patient.  Patient was in an enclosed space for greater than 15 minutes with a COVID-19 patient.  Since 2019, Maddy and has not had upper respiratory infection or influenza-like illness. Has not been diagnosed with lab-confirmed COVID-19 test   Pertinent medical history  Maddy does not get yeast infections when she takes antibiotics.   Maddy does not need a return to work/school note.   Weight: 215 lbs   Maddy does not smoke or use " smokeless tobacco.   She denies pregnancy and denies breastfeeding. She has menstruated in the past month.   Additional information as reported by the patient (free text): I need to get tested because I have appointments and need to know if I can go or if I need to reschedule   Weight: 215 lbs    MEDICATIONS: sertraline oral, Vraylar oral, ALLERGIES: NKDA  Clinician Response:  Dear Maddy,   Based on your exposure to COVID-19 (coronavirus), we would like to test you for this virus.  1. Please call [phone] to schedule your visit. Explain that you were referred by [facility] to have a COVID-19 test. Be ready to share [facility] number.  The following will serve as your written order for this COVID Test, ordered by me, for the indication of suspected COVID [Z20.828]: The test will be ordered in [facility], our electronic health record, after you are scheduled. It will show as ordered and authorized by Hector Andrade MD.  Order: COVID-19 (coronavirus) PCR for ASYMPTOMATIC EXPOSURE testing from [facility].  If you know you have had close contact with someone who tested positive, you should be quarantined for 14 days after this exposure. You should stay in quarantine for the14 days even if the covid test is negative, the optimal time to test after exposure is 5-7 days from the exposure  Quarantine means   What should I do?  For safety, it's very important to follow these rules. Do this for 14 days after the date you were last exposed to the virus..  Stay home and away from others. Don't go to school or anywhere else. Generally quarantine means staying home from work but there are some exceptions to this. Please contact your workplace.   No hugging, kissing or shaking hands.  Don't let anyone visit.  Cover your mouth and nose with a mask, tissue or washcloth to avoid spreading germs.  Wash your hands and face often. Use soap and water.  What are the symptoms of COVID-19?  The most common symptoms are cough, fever and trouble  breathing. Less common symptoms include headache, body aches, fatigue (feeling very tired), chills, sore throat, stuffy or runny nose, diarrhea (loose poop), loss of taste or smell, belly pain, and nausea or vomiting (feeling sick to your stomach or throwing up).  After 14 days, if you have still don't have symptoms, you likely don't have this virus.  If you develop symptoms, follow these guidelines.  If you're normally healthy: Please start another [facility] visit to report your symptoms. Go to [email or web address].  If you have a serious health problem (like cancer, heart failure, an organ transplant or kidney disease): Call your specialty clinic. Let them know that you might have COVID-19.  2. When it's time for your COVID test:  Stay at least 6 feet away from others. (If someone will drive you to your test, stay in the backseat, as far away from the  as you can.)  Cover your mouth and nose with a mask, tissue or washcloth.  Go straight to the testing site. Don't make any stops on the way there or back.  Please note  Caregivers in these groups are at risk for severe illness due to COVID-19:  o People 65 years and older  o People who live in a nursing home or long-term care facility  o People with chronic disease (lung, heart, cancer, diabetes, kidney, liver, immunologic)  o People who have a weakened immune system, including those who:  Are in cancer treatment  Take medicine that weakens the immune system, such as corticosteroids  Had a bone marrow or organ transplant  Have an immune deficiency  Have poorly controlled HIV or AIDS  Are obese (body mass index of 40 or higher)  Smoke regularly  Where can I get more information?   [employer] [place] -- About COVID-19: [email or web address]  CDC -- What to Do If You're Sick: www.cdc.gov/coronavirus/2019-ncov/about/steps-when-sick.html  CDC -- Ending Home Isolation: www.cdc.gov/coronavirus/2019-ncov/hcp/disposition-in-home-patients.html  CDC -- Caring for Someone:  www.cdc.gov/coronavirus/2019-ncov/if-you-are-sick/care-for-someone.html  [facility] -- Interim Guidance for Hospital Discharge to Home: www.health.[facility].[place].us/diseases/coronavirus/hcp/hospdischarge.pdf  [facility] clinical trials (COVID-19 research studies): clinicalaffairs.[facility]-clinical-trials  Below are the COVID-19 hotlines at the [place] Department of Health ([facility]). Interpreters are available.  For health questions: Call 388-129-2768 or 1-561.422.3942 (7 a.m. to 7 p.m.)  For questions about schools and childcare: Call 394-789-6397 or 1-494.438.2746 (7 a.m. to 7 p.m.)    Diagnosis: Contact with and (suspected) exposure to other viral communicable diseases  Diagnosis ICD: Z20.828

## 2020-09-19 DIAGNOSIS — Z20.822 ENCOUNTER FOR LABORATORY TESTING FOR COVID-19 VIRUS: Primary | ICD-10-CM

## 2020-09-19 PROCEDURE — U0003 INFECTIOUS AGENT DETECTION BY NUCLEIC ACID (DNA OR RNA); SEVERE ACUTE RESPIRATORY SYNDROME CORONAVIRUS 2 (SARS-COV-2) (CORONAVIRUS DISEASE [COVID-19]), AMPLIFIED PROBE TECHNIQUE, MAKING USE OF HIGH THROUGHPUT TECHNOLOGIES AS DESCRIBED BY CMS-2020-01-R: HCPCS | Performed by: FAMILY MEDICINE

## 2020-09-20 LAB
SARS-COV-2 RNA SPEC QL NAA+PROBE: NOT DETECTED
SPECIMEN SOURCE: NORMAL

## 2020-09-22 ENCOUNTER — ALLIED HEALTH/NURSE VISIT (OUTPATIENT)
Dept: ALLERGY | Facility: CLINIC | Age: 35
End: 2020-09-22
Payer: COMMERCIAL

## 2020-09-22 ENCOUNTER — THERAPY VISIT (OUTPATIENT)
Dept: CHIROPRACTIC MEDICINE | Facility: CLINIC | Age: 35
End: 2020-09-22
Payer: COMMERCIAL

## 2020-09-22 DIAGNOSIS — M99.05 SEGMENTAL DYSFUNCTION OF PELVIC REGION: Primary | ICD-10-CM

## 2020-09-22 DIAGNOSIS — M99.01 CERVICAL SEGMENT DYSFUNCTION: ICD-10-CM

## 2020-09-22 DIAGNOSIS — M54.50 LOW BACK PAIN: ICD-10-CM

## 2020-09-22 DIAGNOSIS — M62.838 SPASM OF MUSCLE: ICD-10-CM

## 2020-09-22 DIAGNOSIS — Z51.6 NEED FOR DESENSITIZATION TO ALLERGENS: Primary | ICD-10-CM

## 2020-09-22 DIAGNOSIS — M99.02 THORACIC SEGMENT DYSFUNCTION: ICD-10-CM

## 2020-09-22 DIAGNOSIS — M99.03 SEGMENTAL DYSFUNCTION OF LUMBAR REGION: ICD-10-CM

## 2020-09-22 DIAGNOSIS — M54.2 NECK ACHE: ICD-10-CM

## 2020-09-22 DIAGNOSIS — J30.9 ALLERGIC RHINITIS: ICD-10-CM

## 2020-09-22 PROCEDURE — 99207 ZZC DROP WITH A PROCEDURE: CPT

## 2020-09-22 PROCEDURE — 95117 IMMUNOTHERAPY INJECTIONS: CPT

## 2020-09-22 PROCEDURE — 98941 CHIROPRACT MANJ 3-4 REGIONS: CPT | Mod: AT | Performed by: CHIROPRACTOR

## 2020-09-22 NOTE — PROGRESS NOTES
Visit #:  2 of 8, based on treatment plan    Subjective:  Maddy Ortiz is a 34 year old female who is seen in f/u up for:        Segmental dysfunction of pelvic region  Low back pain  Segmental dysfunction of lumbar region  Spasm of muscle  Thoracic segment dysfunction  Neck ache  Cervical segment dysfunction.     Since last visit on 9/8/2020,  Maddy Ortiz reports the following changes: Pain immediately after last treatment: 3/10 and their pain level today 4/10.  Maddy reports that she felt better after last treatment and then her pain returned.  Her low back is feeling stiff and sore R>>L and her neck and upper back is feeling very stiff and sore    Area of chief complaint:  Cervical and Lumbar :  Symptoms are graded at 4/10. The quality is described as stiff, achey.  Motion has increased, but is still not normal. Patient feels that they are improved due to a reduction in symptoms.        Objective:  The following was observed:    P: palpatory tendernessPiriformis and Traps     A: static palpation demonstrates intersegmental asymmetry , cervical, thoracic, lumbar, pelvis    R: motion palpation notes restricted motion, C6 , C7 , T1 , T2 , T5 , T6 , T7 , L4 , L5  and PSIS Right     T: The following soft tissue hypotonicities were observed:  Piriformis: right, ache, dull pain and stiff, referred pain: no  Traps: bilateral, ache, dull pain and stiff, referred pain: no      Assessment:    Segmental spinal dysfunction/restrictions found at:  C6   C7   T1   T2   T5  T6  T7  L4  L5  PSIS Right    Diagnoses:      1. Segmental dysfunction of pelvic region    2. Low back pain    3. Segmental dysfunction of lumbar region    4. Spasm of muscle    5. Thoracic segment dysfunction    6. Neck ache    7. Cervical segment dysfunction        Patient's condition:  Patient had restrictions pre-manipulation    Treatment effectiveness:  Post manipulation there is better intersegmental movement and Patient claims to feel looser post  manipulation      Procedures:  CMT:  40514 Chiropractic manipulative treatment 3-4 regions performed   Cervical: Diversified, C6, C7 , Prone  Thoracic: Diversified, T1, T2, T5, T6, T7, Prone, Supine  Lumbar: Activator, L4, L5, Prone  Pelvis: Drop Table, PSIS Right , Prone    Modalities:  25801: MSTM:  To Piriformis and Traps  for 5 min    Therapeutic procedures:  None      Prognosis: Good    Progress towards Goals: Patient is making progress towards the goal     Response to Treatment:   Reduction in symptoms as reported by patient      Recommendations:    Instructions:  stretch as instructed  and ice 20 minutes every other hour as needed    Follow-up:   Return to care in one week

## 2020-09-26 ENCOUNTER — NURSE TRIAGE (OUTPATIENT)
Dept: NURSING | Facility: CLINIC | Age: 35
End: 2020-09-26

## 2020-09-26 NOTE — TELEPHONE ENCOUNTER
"Patient is wondering if she can go on the birth control pill with her Mirena IUD.    Patient was seen in the clinic 9/11/20 and was given OCP's to take for breakthrough bleeding on the IUD. Patient states she likes being on the birth control and is requesting to continue.   Pt feels like the birth control has improved her mood as well.  Pt is requesting to stay on it past the 3 months that was recommended. Okay to continue past 3 months or should she follow up in clinic to discuss?     Please advise.    Alka Patel RN 09/26/20 6:49 PM  [facility] Nurse Advisor      Reason for Disposition    Caller requesting a NON-URGENT new prescription or refill and triager unable to refill per unit policy    Additional Information    Negative: Caller has NON-URGENT medication question about med that PCP prescribed and triager unable to answer question    Negative: [1] DOUBLE DOSE (an extra dose or lesser amount) of prescription drug AND [2] NO symptoms (Exception: a double dose of antibiotics)    Negative: Diabetes drug error or overdose (e.g., insulin or extra dose)    Negative: [1] Request for URGENT new prescription or refill of \"essential\" medication (i.e., likelihood of harm to patient if not taken) AND [2] triager unable to fill per unit policy    Negative: [1] Prescription not at pharmacy AND [2] was prescribed today by PCP    Negative: Pharmacy calling with prescription questions and triager unable to answer question    Negative: Caller has urgent medication question about med that PCP prescribed and triager unable to answer question    Negative: MORE THAN A DOUBLE DOSE of a prescription or over-the-counter (OTC) drug    Negative: [1] DOUBLE DOSE (an extra dose or lesser amount) of over-the-counter (OTC) drug AND [2] any symptoms (e.g., dizziness, nausea, pain, sleepiness)    Negative: [1] DOUBLE DOSE (an extra dose or lesser amount) of prescription drug AND [2] any symptoms (e.g., dizziness, nausea, pain, " sleepiness)    Negative: Took another person's prescription drug    Negative: Drug overdose and nurse unable to answer question    Negative: Caller requesting information not related to medicine    Negative: Caller requesting a prescription for Strep throat and has a positive culture result    Negative: Rash while taking a medication or within 3 days of stopping it    Negative: Immunization reaction suspected    Negative: [1] Asthma AND [2] having symptoms of asthma (cough, wheezing, etc)    Protocols used: MEDICATION QUESTION CALL-A-

## 2020-09-28 ENCOUNTER — VIRTUAL VISIT (OUTPATIENT)
Dept: FAMILY MEDICINE | Facility: CLINIC | Age: 35
End: 2020-09-28
Payer: COMMERCIAL

## 2020-09-28 DIAGNOSIS — N93.8 DUB (DYSFUNCTIONAL UTERINE BLEEDING): ICD-10-CM

## 2020-09-28 PROCEDURE — 99213 OFFICE O/P EST LOW 20 MIN: CPT | Mod: 95 | Performed by: NURSE PRACTITIONER

## 2020-09-28 RX ORDER — LEVONORGESTREL/ETHIN.ESTRADIOL 0.1-0.02MG
1 TABLET ORAL DAILY
Qty: 84 TABLET | Refills: 3 | Status: SHIPPED | OUTPATIENT
Start: 2020-09-28 | End: 2022-03-03

## 2020-09-28 NOTE — PROGRESS NOTES
"Maddy Ortiz is a 34 year old female who is being evaluated via a billable telephone visit.      The patient has been notified of following:     \"This telephone visit will be conducted via a call between you and your physician/provider. We have found that certain health care needs can be provided without the need for a physical exam.  This service lets us provide the care you need with a short phone conversation.  If a prescription is necessary we can send it directly to your pharmacy.  If lab work is needed we can place an order for that and you can then stop by our lab to have the test done at a later time.    Telephone visits are billed at different rates depending on your insurance coverage. During this emergency period, for some insurers they may be billed the same as an in-person visit.  Please reach out to your insurance provider with any questions.    If during the course of the call the physician/provider feels a telephone visit is not appropriate, you will not be charged for this service.\"    Patient has given verbal consent for Telephone visit?  Yes    What phone number would you like to be contacted at? [phone]    How would you like to obtain your AVS? MyChart    Subjective     Maddy Ortiz is a 34 year old female who presents via phone visit today for the following health issues:    HPI    Chief Complaint   Patient presents with     Contraception     follow up-  patient wants to discuss continuing the birth control pill- it has improved her mood and she feel much better on it.   Can she continue the IUD and birth control pill?     OCP:  Helps with mood.  Feels more motivated.  Controls bleeding.  She wants to stay on the OCP.  IUD placed in 2017              Review of Systems   Constitutional, HEENT, cardiovascular, pulmonary, gi and gu systems are negative, except as otherwise noted.       Objective          Vitals:  No vitals were obtained today due to virtual visit.  PSYCH: Alert and oriented " times 3; coherent speech, normal   rate and volume, able to articulate logical thoughts, able   to abstract reason, no tangential thoughts, no hallucinations   or delusions   RESP: No cough, no audible wheezing, able to talk in full sentences  Remainder of exam unable to be completed due to telephone visits              Assessment & Plan     DUB (dysfunctional uterine bleeding)  Options discussed.  Patient wants to continue the OCP  Discussed removing IUD - she will schedule another appointment with me for removal.  - levonorgestrel-ethinyl estradiol (AVIANE) 0.1-20 MG-MCG tablet; Take 1 tablet by mouth daily         Return in about 4 weeks (around 10/26/2020).    The risks, benefits and treatment options of prescribed medications or other treatments have been discussed with the patient. The patient verbalized their understanding and should call or follow up if no improvement or if they develop further problems.    PHILL Luo [facility]    Phone call duration:  5 minutes

## 2020-09-29 ENCOUNTER — THERAPY VISIT (OUTPATIENT)
Dept: CHIROPRACTIC MEDICINE | Facility: CLINIC | Age: 35
End: 2020-09-29
Payer: COMMERCIAL

## 2020-09-29 DIAGNOSIS — M99.01 CERVICAL SEGMENT DYSFUNCTION: ICD-10-CM

## 2020-09-29 DIAGNOSIS — M54.50 LUMBAGO: ICD-10-CM

## 2020-09-29 DIAGNOSIS — M99.02 THORACIC SEGMENT DYSFUNCTION: ICD-10-CM

## 2020-09-29 DIAGNOSIS — M62.838 SPASM OF MUSCLE: ICD-10-CM

## 2020-09-29 DIAGNOSIS — M99.05 SEGMENTAL DYSFUNCTION OF PELVIC REGION: Primary | ICD-10-CM

## 2020-09-29 DIAGNOSIS — M54.2 CERVICALGIA: ICD-10-CM

## 2020-09-29 DIAGNOSIS — M99.03 SEGMENTAL DYSFUNCTION OF LUMBAR REGION: ICD-10-CM

## 2020-09-29 PROCEDURE — 98941 CHIROPRACT MANJ 3-4 REGIONS: CPT | Mod: AT | Performed by: CHIROPRACTOR

## 2020-09-29 NOTE — PROGRESS NOTES
Visit #:  3 of 8, based on treatment plan    Subjective:  Maddy Ortiz is a 34 year old female who is seen in f/u up for:        Segmental dysfunction of pelvic region  Lumbago  Segmental dysfunction of lumbar region  Spasm of muscle  Thoracic segment dysfunction  Cervicalgia  Cervical segment dysfunction.     Since last visit on 9/22/2020,  Maddy Ortiz reports the following changes: Pain immediately after last treatment: 4/10 and their pain level today 2/10.  Maddy reports that she felt better after last treatmen.  Her low back is feeling stiff and sore R>>L and her neck and upper back is feeling  stiff and sore, but overall she is feeling better    Area of chief complaint:  Cervical and Lumbar :  Symptoms are graded at 2/10. The quality is described as stiff, achey.  Motion has increased, but is still not normal. Patient feels that they are improved due to a reduction in symptoms.        Objective:  The following was observed:    P: palpatory tendernessPiriformis and Traps     A: static palpation demonstrates intersegmental asymmetry , cervical, thoracic, lumbar, pelvis    R: motion palpation notes restricted motion, C6 , C7 , T1 , T2 , T5 , T6 , T7 , L4 , L5  and PSIS Right     T: The following soft tissue hypotonicities were observed:  Piriformis: right, ache, dull pain and stiff, referred pain: no  Traps: bilateral, ache, dull pain and stiff, referred pain: no      Assessment:    Segmental spinal dysfunction/restrictions found at:  C6   C7   T1   T2   T5  T6  T7  L4  L5  PSIS Right    Diagnoses:      1. Segmental dysfunction of pelvic region    2. Lumbago    3. Segmental dysfunction of lumbar region    4. Spasm of muscle    5. Thoracic segment dysfunction    6. Cervicalgia    7. Cervical segment dysfunction        Patient's condition:  Patient had restrictions pre-manipulation    Treatment effectiveness:  Post manipulation there is better intersegmental movement and Patient claims to feel looser post  manipulation      Procedures:  CMT:  02031 Chiropractic manipulative treatment 3-4 regions performed   Cervical: Diversified, C6, C7 , Prone  Thoracic: Diversified, T1, T2, T5, T6, T7, Prone, Supine  Lumbar: Activator, L4, L5, Prone  Pelvis: Drop Table, PSIS Right , Prone    Modalities:  05154: MSTM:  To Piriformis and Traps  for 5 min    Therapeutic procedures:  None      Prognosis: Good    Progress towards Goals: Patient is making progress towards the goal     Response to Treatment:   Reduction in symptoms as reported by patient      Recommendations:    Instructions:  stretch as instructed  and ice 20 minutes every other hour as needed    Follow-up:   Return to care in one week

## 2020-10-06 ENCOUNTER — NURSE TRIAGE (OUTPATIENT)
Dept: NURSING | Facility: CLINIC | Age: 35
End: 2020-10-06

## 2020-10-06 NOTE — TELEPHONE ENCOUNTER
Pt called in states she has vaginal bleeding.  The bleeding started 2 weeks ago.  The Pt use 4-5 pad per day.  The Pt sates her period is irregular.  Has abdominal cramp.  The cramp is mild.  Pt is not pregnant.  Pt is taking birth control.  Pt is not breast feeding.  No blood thinner medication.  Pt see blood clots 3 times.  Pt able to stand and walk.  Pt drink fluid okay,  No fever.  Care advice given per protocol.  Appointment is made for the Pt  Patient agrees with care advice given.   Agreed to call back if he has additional symptoms or questions.    Lauro King [place] Nurse Advisor 10/6/2020 6:37 PM      Additional Information    Negative: Shock suspected (e.g., cold/pale/clammy skin, too weak to stand, low BP, rapid pulse)    Negative: Difficult to awaken or acting confused (e.g., disoriented, slurred speech)    Negative: Passed out (i.e., lost consciousness, collapsed and was not responding)    Negative: Sounds like a life-threatening emergency to the triager    Negative: Followed a genital area injury    Negative: Pregnant > 20 weeks  (5 months or more)    Negative: Pregnant < 20 weeks  (less than 5 months)    Negative: Postpartum (from 0 to 6 weeks after delivery)    Negative: Bleeding occurring > 12 months after menopause    Negative: Bleeding from sexual abuse or rape    Negative: [1] Vaginal discharge is main symptom AND [2] small amount of blood    Negative: SEVERE abdominal pain    Negative: SEVERE dizziness (e.g., unable to stand, requires support to walk, feels like passing out now)    Negative: SEVERE vaginal bleeding (i.e., soaking 2 pads or tampons per hour and present 2 or more hours; 1 menstrual cup every 2 hours)    Negative: Patient sounds very sick or weak to the triager    Negative: MODERATE vaginal bleeding (i.e., soaking 1 pad or tampon per hour and present > 6 hours; 1 menstrual cup every 6 hours)    Negative: [1] Constant abdominal pain AND [2] present > 2 hours    Negative: Pale  skin (pallor) of new onset or worsening    Negative: Passed tissue (e.g., gray-white)    Negative: Taking Coumadin (warfarin) or other strong blood thinner, or known bleeding disorder (e.g., thrombocytopenia)    Negative: [1] Skin bruises or nosebleed AND [2] not caused by an injury    [1] Periods with > 6 soaked pads or tampons per day AND [2] last > 7 days    Protocols used: VAGINAL BLEEDING - [id number]

## 2020-10-08 ENCOUNTER — THERAPY VISIT (OUTPATIENT)
Dept: CHIROPRACTIC MEDICINE | Facility: CLINIC | Age: 35
End: 2020-10-08
Payer: MEDICARE

## 2020-10-08 DIAGNOSIS — M99.02 THORACIC SEGMENT DYSFUNCTION: ICD-10-CM

## 2020-10-08 DIAGNOSIS — M99.05 SEGMENTAL DYSFUNCTION OF PELVIC REGION: Primary | ICD-10-CM

## 2020-10-08 DIAGNOSIS — M54.50 LUMBAGO: ICD-10-CM

## 2020-10-08 DIAGNOSIS — M99.01 CERVICAL SEGMENT DYSFUNCTION: ICD-10-CM

## 2020-10-08 DIAGNOSIS — M99.03 SEGMENTAL DYSFUNCTION OF LUMBAR REGION: ICD-10-CM

## 2020-10-08 DIAGNOSIS — M54.2 CERVICALGIA: ICD-10-CM

## 2020-10-08 DIAGNOSIS — M62.838 SPASM OF MUSCLE: ICD-10-CM

## 2020-10-08 PROCEDURE — 98941 CHIROPRACT MANJ 3-4 REGIONS: CPT | Mod: AT | Performed by: CHIROPRACTOR

## 2020-10-08 NOTE — PROGRESS NOTES
Visit #:  4 of 8, based on treatment plan    Subjective:  Maddy Ortiz is a 34 year old female who is seen in f/u up for:        Segmental dysfunction of pelvic region  Lumbago  Segmental dysfunction of lumbar region  Spasm of muscle  Thoracic segment dysfunction  Cervicalgia  Cervical segment dysfunction.     Since last visit on 9/29/2020,  Maddy Ortiz reports the following changes: Pain immediately after last treatment: 2/10 and their pain level today 2/10.  Maddy reports that she felt better after last treatment. She is still having some stiffness and soreness in her low back and neck.  She does mention that she ia ble to exercise more and overall is feeling better despite still having some symptoms.     Area of chief complaint:  Cervical and Lumbar :  Symptoms are graded at 2/10. The quality is described as stiff, achey.  Motion has increased, but is still not normal. Patient feels that they are improved due to a reduction in symptoms.        Objective:  The following was observed:    P: palpatory tendernessPiriformis and Traps     A: static palpation demonstrates intersegmental asymmetry , cervical, thoracic, lumbar, pelvis    R: motion palpation notes restricted motion, C6 , C7 , T1 , T2 , T5 , T6 , T7 , L4 , L5  and PSIS Right     T: The following soft tissue hypotonicities were observed:  Piriformis: right, ache, dull pain and stiff, referred pain: no  Traps: bilateral, ache, dull pain and stiff, referred pain: no      Assessment:    Segmental spinal dysfunction/restrictions found at:  C6   C7   T1   T2   T5  T6  T7  L4  L5  PSIS Right    Diagnoses:      1. Segmental dysfunction of pelvic region    2. Lumbago    3. Segmental dysfunction of lumbar region    4. Spasm of muscle    5. Thoracic segment dysfunction    6. Cervicalgia    7. Cervical segment dysfunction        Patient's condition:  Patient had restrictions pre-manipulation    Treatment effectiveness:  Post manipulation there is better  intersegmental movement and Patient claims to feel looser post manipulation      Procedures:  CMT:  46084 Chiropractic manipulative treatment 3-4 regions performed   Cervical: Diversified, C6, C7 , Prone  Thoracic: Diversified, T1, T2, T5, T6, T7, Prone, Supine  Lumbar: Activator, L4, L5, Prone  Pelvis: Drop Table, PSIS Right , Prone    Modalities:  83418: MSTM:  To Piriformis and Traps  for 5 min    Therapeutic procedures:  None      Prognosis: Good    Progress towards Goals: Patient is making progress towards the goal     Response to Treatment:   Reduction in symptoms as reported by patient      Recommendations:    Instructions:  stretch as instructed  and ice 20 minutes every other hour as needed    Follow-up:   Return to care in one week            1

## 2020-10-08 NOTE — PROGRESS NOTES
"Subjective     Maddy Ortiz is a 34 year old female who presents to clinic today for the following health issues: irregular periods since started OCP, patient also have Merina IUD, would like to discontinue IUD and continue only OCP because it helps her with PMS symptoms     HPI         Menstrual Concern  Onset/Duration: 3 1/2 weeks  Description:   Duration of bleeding episodes:daily  Frequency of periods: (1st day of one to 1st day of next):  Irregular periods  Describe bleeding/flow:   Clots: YES  Number of pads/day: daily        Cramping: moderate  Accompanying Signs & Symptoms:  Lightheadedness: no  Temperature intolerance: no  Nosebleeds/Easy bruising: no  Vaginal Discharge: YES  Acne: no  Change in body hair: no  History:  Patient's last menstrual period was 09/21/2020 (approximate).  Previous normal periods: no   Contraceptive use: oral contraceptive  and IUD   Sexually active: YES  Any bleeding after intercourse: YES once  Abnormal PAP Smears: no  Precipitating or alleviating factors: None  Therapies tried and outcome: None      Review of Systems   Constitutional, HEENT, cardiovascular, pulmonary, gi and gu systems are negative, except as otherwise noted.      Objective    /78   Pulse 88   Temp 98.2  F (36.8  C) (Tympanic)   Resp 16   Ht 1.6 m (5' 3\")   Wt 100.6 kg (221 lb 12.8 oz)   LMP 09/21/2020 (Approximate)   SpO2 96%   Breastfeeding No   BMI 39.29 kg/m    Body mass index is 39.29 kg/m .  Physical Exam   GENERAL: healthy, alert and no distress  EYES: Eyes grossly normal to inspection, PERRL and conjunctivae and sclerae normal  ABDOMEN: soft, nontender   (female): normal female external genitalia, normal urethral meatus , vaginal mucosa pink, moist, well rugated and normal cervix, adnexae, and uterus without masses. IUD in place, was removed today after patient agreed to proceed with removal, consent signed, patient tolerated procedure well.   MS: no gross musculoskeletal defects noted, " no edema  PSYCH: mentation appears normal, affect normal/bright            Assessment & Plan     Encounter for IUD removal  -IUD removed, recommended patient to continue OCP, follow up in 2-3 months if still having irregular periods               See Patient Instructions    Return in about 3 months (around 1/22/2021), or if symptoms worsen or fail to improve.    PHILL Freed [facility]

## 2020-10-12 ENCOUNTER — VIRTUAL VISIT (OUTPATIENT)
Dept: FAMILY MEDICINE | Facility: OTHER | Age: 35
End: 2020-10-12

## 2020-10-12 NOTE — PROGRESS NOTES
"Date: 10/12/2020 08:36:05  Clinician: Chapin Rayo  Clinician NPI: [id number]  Patient: Maddy Ortiz  Patient : 1985  Patient Address: [address] [place] [id number]  Patient Phone: [phone]  Visit Protocol: URI  Patient Summary:  Maddy is a 34 year old ( : 1985 ) female who initiated a [facility] Visit for COVID-19 (Coronavirus) evaluation and screening. When asked the question \"Please sign me up to receive news, health information and promotions. \", Maddy responded \"Yes\".    Maddy states her symptoms started 1-2 days ago.   Her symptoms consist of a cough, nasal congestion, rhinitis, myalgia, chills, malaise, a sore throat, and diarrhea.   Symptom details     Nasal secretions: The color of her mucus is blood-tinged and clear.    Cough: Maddy coughs a few times an hour and her cough is not more bothersome at night. Phlegm does not come into her throat when she coughs. She believes her cough is caused by post-nasal drip.     Sore throat: Maddy reports having mild throat pain (1-3 on a 10 point pain scale), does not have exudate on her tonsils, and can swallow liquids. The lymph nodes in her neck are not enlarged. A rash has not appeared on the skin since the sore throat started.      Maddy denies having ear pain, headache, wheezing, fever, enlarged lymph nodes, anosmia, vomiting, nausea, facial pain or pressure, teeth pain, and ageusia. She also denies taking antibiotic medication in the past month and having recent facial or sinus surgery in the past 60 days. She is not experiencing dyspnea.   Precipitating events  Within the past week, Maddy has not been exposed to someone with strep throat. She has not recently been exposed to someone with influenza. Maddy has not been in close contact with any high risk individuals.   Pertinent COVID-19 (Coronavirus) information  In the past 14 days, Maddy has not worked in a congregate living setting.   She does not work or volunteer as " healthcare worker or a  and does not work or volunteer in a healthcare facility.   Maddy also has not lived in a congregate living setting in the past 14 days. She does not live with a healthcare worker.   Maddy has not had a close contact with a laboratory-confirmed COVID-19 patient within 14 days of symptom onset.   Since December 2019, Maddy and has not had upper respiratory infection or influenza-like illness. Has not been diagnosed with lab-confirmed COVID-19 test   Pertinent medical history  Maddy does not get yeast infections when she takes antibiotics.   Maddy does not need a return to work/school note.   Weight: 220 lbs   Maddy does not smoke or use smokeless tobacco.   She denies pregnancy and denies breastfeeding. She is currently menstruating.   Weight: 220 lbs  Reason for repeat visit for the same protocol within 24 hours:  I click the wrong button  See the History of referred by protocol and completed visits section for details on previous visits (visits currently in queue to be diagnosed will not appear in this section).    MEDICATIONS: Multiple Vitamin-Minerals oral, sertraline oral, Vraylar oral, ALLERGIES: mometasone furoate  Clinician Response:  Dear Maddy,   Your symptoms show that you may have coronavirus (COVID-19). This illness can cause fever, cough and trouble breathing. Many people get a mild case and get better on their own. Some people can get very sick.  What should I do?  We would like to test you for this virus.   1. Please call [phone] to schedule your visit. Explain that you were referred by [facility] to have a COVID-19 test. Be ready to share your [facility] visit ID number.  The following will serve as your written order for this COVID Test, ordered by me, for the indication of suspected COVID [Z20.828]: The test will be ordered in [facility], our electronic health record, after you are scheduled. It will show as ordered and authorized by Hector Andrade MD.  Order: COVID-19  "(Coronavirus) PCR for SYMPTOMATIC testing from [facility].      2. When it's time for your COVID test:  Stay at least 6 feet away from others. (If someone will drive you to your test, stay in the backseat, as far away from the  as you can.)   Cover your mouth and nose with a mask, tissue or washcloth.  Go straight to the testing site. Don't make any stops on the way there or back.      3.Starting now: Stay home and away from others (self-isolate) until:   You've had no fever---and no medicine that reduces fever---for one full day (24 hours). And...   Your other symptoms have gotten better. For example, your cough or breathing has improved. And...   At least 10 days have passed since your symptoms started.       During this time, don't leave the house except for testing or medical care.   Stay in your own room, even for meals. Use your own bathroom if you can.   Stay away from others in your home. No hugging, kissing or shaking hands. No visitors.  Don't go to work, school or anywhere else.    Clean \"high touch\" surfaces often (doorknobs, counters, handles, etc.). Use a household cleaning spray or wipes. You'll find a full list of  on the EPA website: www.epa.gov/pesticide-registration/list-n-disinfectants-use-against-sars-cov-2.   Cover your mouth and nose with a mask, tissue or washcloth to avoid spreading germs.  Wash your hands and face often. Use soap and water.  Caregivers in these groups are at risk for severe illness due to COVID-19:  o People 65 years and older  o People who live in a nursing home or long-term care facility  o People with chronic disease (lung, heart, cancer, diabetes, kidney, liver, immunologic)  o People who have a weakened immune system, including those who:   Are in cancer treatment  Take medicine that weakens the immune system, such as corticosteroids  Had a bone marrow or organ transplant  Have an immune deficiency  Have poorly controlled HIV or AIDS  Are obese (body mass " index of 40 or higher)  Smoke regularly   o Caregivers should wear gloves while washing dishes, handling laundry and cleaning bedrooms and bathrooms.  o Use caution when washing and drying laundry: Don't shake dirty laundry, and use the warmest water setting that you can.  o For more tips, go to www.cdc.gov/coronavirus/2019-ncov/downloads/10Things.pdf.       How can I take care of myself?   Get lots of rest. Drink extra fluids (unless a doctor has told you not to).   Take Tylenol (acetaminophen) for fever or pain. If you have liver or kidney problems, ask your family doctor if it's okay to take Tylenol.   Adults can take either:    650 mg (two 325 mg pills) every 4 to 6 hours, or...   1,000 mg (two 500 mg pills) every 8 hours as needed.    Note: Don't take more than 3,000 mg in one day. Acetaminophen is found in many medicines (both prescribed and over-the-counter medicines). Read all labels to be sure you don't take too much.   For children, check the Tylenol bottle for the right dose. The dose is based on the child's age or weight.    If you have other health problems (like cancer, heart failure, an organ transplant or severe kidney disease): Call your specialty clinic if you don't feel better in the next 2 days.       Know when to call 911. Emergency warning signs include:    Trouble breathing or shortness of breath Pain or pressure in the chest that doesn't go away Feeling confused like you haven't felt before, or not being able to wake up Bluish-colored lips or face.  Where can I get more information?    [facility] -- About COVID-19: [email or web address]   CDC -- What to Do If You're Sick: www.cdc.gov/coronavirus/2019-ncov/about/steps-when-sick.html   CDC -- Ending Home Isolation: www.cdc.gov/coronavirus/2019-ncov/hcp/disposition-in-home-patients.html   CDC -- Caring for Someone: www.cdc.gov/coronavirus/2019-ncov/if-you-are-sick/care-for-someone.html   [facility] -- Interim Guidance for Hospital  Discharge to Home: www.health.[facility].mn.us/diseases/coronavirus/hcp/hospdischarge.pdf   [facility] clinical trials (COVID-19 research studies): clinicalaffairs.[facility]/n-clinical-trials    Below are the COVID-19 hotlines at the [place] [facility] of [facility] ([facility]). Interpreters are available.    For health questions: Call 411-002-6853 or 1-907.241.1205 (7 a.m. to 7 p.m.) For questions about schools and childcare: Call 846-496-9598 or 1-536.979.4960 (7 a.m. to 7 p.m.)    Diagnosis: Nasal congestion  Diagnosis ICD: R09.81

## 2020-10-13 DIAGNOSIS — Z20.822 ENCOUNTER FOR LABORATORY TESTING FOR COVID-19 VIRUS: Primary | ICD-10-CM

## 2020-10-13 PROCEDURE — U0003 INFECTIOUS AGENT DETECTION BY NUCLEIC ACID (DNA OR RNA); SEVERE ACUTE RESPIRATORY SYNDROME CORONAVIRUS 2 (SARS-COV-2) (CORONAVIRUS DISEASE [COVID-19]), AMPLIFIED PROBE TECHNIQUE, MAKING USE OF HIGH THROUGHPUT TECHNOLOGIES AS DESCRIBED BY CMS-2020-01-R: HCPCS | Performed by: FAMILY MEDICINE

## 2020-10-14 LAB
SARS-COV-2 RNA SPEC QL NAA+PROBE: NOT DETECTED
SPECIMEN SOURCE: NORMAL

## 2020-10-15 ENCOUNTER — THERAPY VISIT (OUTPATIENT)
Dept: CHIROPRACTIC MEDICINE | Facility: CLINIC | Age: 35
End: 2020-10-15
Payer: MEDICARE

## 2020-10-15 DIAGNOSIS — M99.02 THORACIC SEGMENT DYSFUNCTION: ICD-10-CM

## 2020-10-15 DIAGNOSIS — M54.2 CERVICALGIA: ICD-10-CM

## 2020-10-15 DIAGNOSIS — M99.03 SEGMENTAL DYSFUNCTION OF LUMBAR REGION: ICD-10-CM

## 2020-10-15 DIAGNOSIS — M62.838 SPASM OF MUSCLE: ICD-10-CM

## 2020-10-15 DIAGNOSIS — M99.05 SEGMENTAL DYSFUNCTION OF PELVIC REGION: Primary | ICD-10-CM

## 2020-10-15 DIAGNOSIS — M99.01 CERVICAL SEGMENT DYSFUNCTION: ICD-10-CM

## 2020-10-15 DIAGNOSIS — M54.50 LUMBAGO: ICD-10-CM

## 2020-10-15 PROCEDURE — 98941 CHIROPRACT MANJ 3-4 REGIONS: CPT | Mod: AT | Performed by: CHIROPRACTOR

## 2020-10-15 NOTE — PROGRESS NOTES
Visit #:  5 of 8, based on treatment plan    Subjective:  Maddy Ortiz is a 34 year old female who is seen in f/u up for:        Segmental dysfunction of pelvic region  Lumbago  Segmental dysfunction of lumbar region  Spasm of muscle  Thoracic segment dysfunction  Cervicalgia  Cervical segment dysfunction.     Since last visit on 10/8/2020,  Maddy Ortiz reports the following changes: Pain immediately after last treatment: 2/10 and their pain level today 2/10.  Maddy reports that she felt better after last treatment. She mentions that she feels stiff this morning due to the cold weather.  Maddy is going to be increasing her walking.  She was asking about exercising and how this relates to her back pain.  She is felt that this is going to help her.       Area of chief complaint:  Cervical and Lumbar :  Symptoms are graded at 2/10. The quality is described as stiff, achey.  Motion has increased, but is still not normal. Patient feels that they are improved due to a reduction in symptoms.        Objective:  The following was observed:    P: palpatory tendernessPiriformis and Traps     A: static palpation demonstrates intersegmental asymmetry , cervical, thoracic, lumbar, pelvis    R: motion palpation notes restricted motion, C6 , C7 , T1 , T2 , T5 , T6 , T7 , L4 , L5  and PSIS Right     T: The following soft tissue hypotonicities were observed:  Piriformis: right, ache, dull pain and stiff, referred pain: no  Traps: bilateral, ache, dull pain and stiff, referred pain: no      Assessment:    Segmental spinal dysfunction/restrictions found at:  C6   C7   T1   T2   T5  T6  T7  L4  L5  PSIS Right    Diagnoses:      1. Segmental dysfunction of pelvic region    2. Lumbago    3. Segmental dysfunction of lumbar region    4. Spasm of muscle    5. Thoracic segment dysfunction    6. Cervicalgia    7. Cervical segment dysfunction        Patient's condition:  Patient had restrictions pre-manipulation    Treatment  effectiveness:  Post manipulation there is better intersegmental movement and Patient claims to feel looser post manipulation      Procedures:  CMT:  92917 Chiropractic manipulative treatment 3-4 regions performed   Cervical: Diversified, C6, C7 , Prone  Thoracic: Diversified, T1, T2, T5, T6, T7, Prone, Supine  Lumbar: Activator, L4, L5, Prone  Pelvis: Drop Table, PSIS Right , Prone    Modalities:  99953: MSTM:  To Piriformis and Traps  for 5 min    Therapeutic procedures:  None      Prognosis: Good    Progress towards Goals: Patient is making progress towards the goal     Response to Treatment:   Reduction in symptoms as reported by patient      Recommendations:    Instructions:  stretch as instructed  and ice 20 minutes every other hour as needed    Follow-up:   Return to care in one week            1

## 2020-10-21 ENCOUNTER — THERAPY VISIT (OUTPATIENT)
Dept: CHIROPRACTIC MEDICINE | Facility: CLINIC | Age: 35
End: 2020-10-21
Payer: MEDICARE

## 2020-10-21 ENCOUNTER — TRANSFERRED RECORDS (OUTPATIENT)
Dept: HEALTH INFORMATION MANAGEMENT | Facility: CLINIC | Age: 35
End: 2020-10-21

## 2020-10-21 DIAGNOSIS — M99.03 SEGMENTAL DYSFUNCTION OF LUMBAR REGION: ICD-10-CM

## 2020-10-21 DIAGNOSIS — M99.02 THORACIC SEGMENT DYSFUNCTION: ICD-10-CM

## 2020-10-21 DIAGNOSIS — M62.838 SPASM OF MUSCLE: ICD-10-CM

## 2020-10-21 DIAGNOSIS — M99.05 SEGMENTAL DYSFUNCTION OF PELVIC REGION: Primary | ICD-10-CM

## 2020-10-21 DIAGNOSIS — M99.01 CERVICAL SEGMENT DYSFUNCTION: ICD-10-CM

## 2020-10-21 DIAGNOSIS — M54.50 LUMBAGO: ICD-10-CM

## 2020-10-21 DIAGNOSIS — M54.2 CERVICALGIA: ICD-10-CM

## 2020-10-21 PROCEDURE — 98941 CHIROPRACT MANJ 3-4 REGIONS: CPT | Mod: AT | Performed by: CHIROPRACTOR

## 2020-10-21 NOTE — PROGRESS NOTES
Visit #:  6 of 8, based on treatment plan    Subjective:  Maddy Ortiz is a 34 year old female who is seen in f/u up for:        Segmental dysfunction of pelvic region  Lumbago  Segmental dysfunction of lumbar region  Spasm of muscle  Thoracic segment dysfunction  Cervicalgia  Cervical segment dysfunction.     Since last visit on 10/15/2020,  Maddy Ortiz reports the following changes: Pain immediately after last treatment: 2/10 and their pain level today 2/10.  Maddy reports that she felt better after last treatment. She has not increased her walking and feels the cold weather is causing some stiffness in her low back.  She does also mention that when running her knees can be sore.  She currently does not have knee pain nor does she have knee pain every time she runs.    Area of chief complaint:  Cervical and Lumbar :  Symptoms are graded at 2/10. The quality is described as stiff, achey.  Motion has increased, but is still not normal. Patient feels that they are improved due to a reduction in symptoms.        Objective:  The following was observed:    P: palpatory tendernessPiriformis and Traps     A: static palpation demonstrates intersegmental asymmetry , cervical, thoracic, lumbar, pelvis    R: motion palpation notes restricted motion, C6 , C7 , T1 , T2 , T5 , T6 , T7 , L4 , L5  and PSIS Right     T: The following soft tissue hypotonicities were observed:  Piriformis: right, ache, dull pain and stiff, referred pain: no  Traps: bilateral, ache, dull pain and stiff, referred pain: no      Assessment:    Segmental spinal dysfunction/restrictions found at:  C6   C7   T1   T2   T5  T6  T7  L4  L5  PSIS Right    Diagnoses:      1. Segmental dysfunction of pelvic region    2. Lumbago    3. Segmental dysfunction of lumbar region    4. Spasm of muscle    5. Thoracic segment dysfunction    6. Cervicalgia    7. Cervical segment dysfunction        Patient's condition:  Patient had restrictions  pre-manipulation    Treatment effectiveness:  Post manipulation there is better intersegmental movement and Patient claims to feel looser post manipulation      Procedures:  CMT:  81037 Chiropractic manipulative treatment 3-4 regions performed   Cervical: Diversified, C6, C7 , Prone  Thoracic: Diversified, T1, T2, T5, T6, T7, Prone, Supine  Lumbar: Activator, L4, L5, Prone  Pelvis: Drop Table, PSIS Right , Prone    Modalities:  22762: MSTM:  To Piriformis and Traps  for 5 min    Therapeutic procedures:  None      Prognosis: Good    Progress towards Goals: Patient is making progress towards the goal     Response to Treatment:   Reduction in symptoms as reported by patient      Recommendations:    Instructions:  stretch as instructed  and ice 20 minutes every other hour as needed    Follow-up:   Return to care in two weeks

## 2020-10-22 ENCOUNTER — OFFICE VISIT (OUTPATIENT)
Dept: FAMILY MEDICINE | Facility: CLINIC | Age: 35
End: 2020-10-22
Payer: MEDICARE

## 2020-10-22 VITALS
WEIGHT: 221.8 LBS | HEART RATE: 88 BPM | DIASTOLIC BLOOD PRESSURE: 78 MMHG | RESPIRATION RATE: 16 BRPM | OXYGEN SATURATION: 96 % | BODY MASS INDEX: 39.3 KG/M2 | HEIGHT: 63 IN | SYSTOLIC BLOOD PRESSURE: 120 MMHG | TEMPERATURE: 98.2 F

## 2020-10-22 DIAGNOSIS — Z30.432 ENCOUNTER FOR IUD REMOVAL: Primary | ICD-10-CM

## 2020-10-22 PROCEDURE — 99213 OFFICE O/P EST LOW 20 MIN: CPT | Performed by: NURSE PRACTITIONER

## 2020-10-22 ASSESSMENT — PAIN SCALES - GENERAL: PAINLEVEL: NO PAIN (0)

## 2020-10-22 ASSESSMENT — MIFFLIN-ST. JEOR: SCORE: 1670.21

## 2020-10-22 NOTE — PATIENT INSTRUCTIONS
IUD removed, continue birth control pill, follow up if still have issues with irregular periods

## 2020-10-24 ENCOUNTER — NURSE TRIAGE (OUTPATIENT)
Dept: NURSING | Facility: CLINIC | Age: 35
End: 2020-10-24

## 2020-10-24 NOTE — TELEPHONE ENCOUNTER
Caller is complaining of large abdominal girth. Caller denies any pain or bruises. Caller does have 1 child and states she has been having bleeding for about 3 weeks now. Triage guidelines recommend to see provider within 3 days. Caller verbalized and understands directives. Caller transferred to scheduling to make an appointment.   COVID 19 Nurse Triage Plan/Patient Instructions    Please be aware that novel coronavirus (COVID-19) may be circulating in the community. If you develop symptoms such as fever, cough, or SOB or if you have concerns about the presence of another infection including coronavirus (COVID-19), please contact your health care provider or visit [email or web address].     Disposition/Instructions    In-Person Visit with provider recommended. Reference Visit Selection Guide.    Thank you for taking steps to prevent the spread of this virus.  o Limit your contact with others.  o Wear a simple mask to cover your cough.  o Wash your hands well and often.    Resources    [facility]: About COVID-19: [email or web address]    CDC: What to Do If You're Sick: www.cdc.gov/coronavirus/2019-ncov/about/steps-when-sick.html    CDC: Ending Home Isolation: www.cdc.gov/coronavirus/2019-ncov/hcp/disposition-in-home-patients.html     CDC: Caring for Someone: www.cdc.gov/coronavirus/2019-ncov/if-you-are-sick/care-for-someone.html     [facility]: Interim Guidance for Hospital Discharge to Home: www.health.[facility].mn.us/diseases/coronavirus/hcp/hospdischarge.pdf    [facility] clinical trials (COVID-19 research studies): clinicalaffairs.[facility]/umn-clinical-trials     Below are the COVID-19 hotlines at the [facility] ([facility]). Interpreters are available.   o For health questions: Call 352-366-3617 or 1-817.673.9904 (7 a.m. to 7 p.m.)  o For questions about schools and childcare: Call 723-842-3391 or 1-253.111.5027 (7 a.m. to 7 p.m.)                     Additional Information    Negative:  [1] Abdominal pain, moderate-severe AND [2] upper    Negative: [1] Abdominal pain AND [2] pregnant > 20 weeks    Negative: [1] Abdominal pain AND [2] pregnant < 20 weeks    Negative: [1] Abdominal pain, moderate-severe AND [2] female    Negative: [1] Abdominal pain, moderate-severe AND [2] male    Negative: [1] Pregnant < 20 Weeks AND [2] nausea/vomiting began in early pregnancy (i.e., 4-8 weeks pregnant)    Negative: Vomiting    Negative: Diarrhea    Negative: [1] Abdominal pain, mild AND [2] female    Negative: [1] Abdominal pain, mild AND [2] male    Negative: Nausea    Negative: Constipation    Negative: Fever    Protocols used: GI MULTIPLE SYMPTOMS - GUIDELINE [id number]

## 2020-10-26 ENCOUNTER — HOSPITAL ENCOUNTER (EMERGENCY)
Facility: CLINIC | Age: 35
Discharge: HOME | End: 2020-10-26
Attending: EMERGENCY MEDICINE | Admitting: EMERGENCY MEDICINE
Payer: MEDICARE

## 2020-10-26 ENCOUNTER — NURSE TRIAGE (OUTPATIENT)
Dept: FAMILY MEDICINE | Facility: CLINIC | Age: 35
End: 2020-10-26

## 2020-10-26 ENCOUNTER — APPOINTMENT (OUTPATIENT)
Dept: ULTRASOUND IMAGING | Facility: CLINIC | Age: 35
End: 2020-10-26
Attending: EMERGENCY MEDICINE
Payer: MEDICARE

## 2020-10-26 VITALS
RESPIRATION RATE: 14 BRPM | DIASTOLIC BLOOD PRESSURE: 95 MMHG | BODY MASS INDEX: 39.16 KG/M2 | SYSTOLIC BLOOD PRESSURE: 142 MMHG | WEIGHT: 221 LBS | TEMPERATURE: 98 F | HEIGHT: 63 IN | OXYGEN SATURATION: 98 % | HEART RATE: 102 BPM

## 2020-10-26 DIAGNOSIS — Z87.42 HISTORY OF IRREGULAR MENSTRUAL BLEEDING: ICD-10-CM

## 2020-10-26 DIAGNOSIS — N93.9 VAGINAL BLEEDING: ICD-10-CM

## 2020-10-26 LAB
ABO + RH BLD: NORMAL
ABO + RH BLD: NORMAL
BASOPHILS # BLD AUTO: 0.1 10E9/L (ref 0–0.2)
BASOPHILS NFR BLD AUTO: 0.5 %
BLD GP AB SCN SERPL QL: NORMAL
BLOOD BANK CMNT PATIENT-IMP: NORMAL
DIFFERENTIAL METHOD BLD: ABNORMAL
EOSINOPHIL # BLD AUTO: 0.4 10E9/L (ref 0–0.7)
EOSINOPHIL NFR BLD AUTO: 3.2 %
ERYTHROCYTE [DISTWIDTH] IN BLOOD BY AUTOMATED COUNT: 11.9 % (ref 10–15)
HCG SERPL QL: NEGATIVE
HCT VFR BLD AUTO: 43.1 % (ref 35–47)
HGB BLD-MCNC: 14.4 G/DL (ref 11.7–15.7)
IMM GRANULOCYTES # BLD: 0.1 10E9/L (ref 0–0.4)
IMM GRANULOCYTES NFR BLD: 0.5 %
LYMPHOCYTES # BLD AUTO: 4.1 10E9/L (ref 0.8–5.3)
LYMPHOCYTES NFR BLD AUTO: 33.8 %
MCH RBC QN AUTO: 29.3 PG (ref 26.5–33)
MCHC RBC AUTO-ENTMCNC: 33.4 G/DL (ref 31.5–36.5)
MCV RBC AUTO: 88 FL (ref 78–100)
MONOCYTES # BLD AUTO: 1 10E9/L (ref 0–1.3)
MONOCYTES NFR BLD AUTO: 8.2 %
NEUTROPHILS # BLD AUTO: 6.5 10E9/L (ref 1.6–8.3)
NEUTROPHILS NFR BLD AUTO: 53.8 %
NRBC # BLD AUTO: 0 10*3/UL
NRBC BLD AUTO-RTO: 0 /100
PLATELET # BLD AUTO: 264 10E9/L (ref 150–450)
RBC # BLD AUTO: 4.91 10E12/L (ref 3.8–5.2)
SPECIMEN EXP DATE BLD: NORMAL
WBC # BLD AUTO: 12 10E9/L (ref 4–11)

## 2020-10-26 PROCEDURE — 84703 CHORIONIC GONADOTROPIN ASSAY: CPT | Performed by: EMERGENCY MEDICINE

## 2020-10-26 PROCEDURE — 86900 BLOOD TYPING SEROLOGIC ABO: CPT | Performed by: EMERGENCY MEDICINE

## 2020-10-26 PROCEDURE — 86901 BLOOD TYPING SEROLOGIC RH(D): CPT | Performed by: EMERGENCY MEDICINE

## 2020-10-26 PROCEDURE — 99284 EMERGENCY DEPT VISIT MOD MDM: CPT | Mod: 25 | Performed by: EMERGENCY MEDICINE

## 2020-10-26 PROCEDURE — 86850 RBC ANTIBODY SCREEN: CPT | Performed by: EMERGENCY MEDICINE

## 2020-10-26 PROCEDURE — 93976 VASCULAR STUDY: CPT

## 2020-10-26 PROCEDURE — 99285 EMERGENCY DEPT VISIT HI MDM: CPT | Performed by: EMERGENCY MEDICINE

## 2020-10-26 PROCEDURE — 85025 COMPLETE CBC W/AUTO DIFF WBC: CPT | Performed by: EMERGENCY MEDICINE

## 2020-10-26 RX ORDER — SERTRALINE HYDROCHLORIDE 100 MG/1
200 TABLET, FILM COATED ORAL EVERY EVENING
Status: ON HOLD | COMMUNITY
Start: 2020-10-21 | End: 2021-07-21

## 2020-10-26 RX ORDER — AZELASTINE 1 MG/ML
2 SPRAY, METERED NASAL DAILY PRN
COMMUNITY
Start: 2019-05-07 | End: 2021-03-19

## 2020-10-26 ASSESSMENT — ENCOUNTER SYMPTOMS
CONSTITUTIONAL NEGATIVE: 1
NEUROLOGICAL NEGATIVE: 1
ALLERGIC/IMMUNOLOGIC NEGATIVE: 1
PSYCHIATRIC NEGATIVE: 1
MUSCULOSKELETAL NEGATIVE: 1
RESPIRATORY NEGATIVE: 1
CARDIOVASCULAR NEGATIVE: 1
ABDOMINAL PAIN: 1
HEMATOLOGIC/LYMPHATIC NEGATIVE: 1
ENDOCRINE NEGATIVE: 1
EYES NEGATIVE: 1

## 2020-10-26 ASSESSMENT — MIFFLIN-ST. JEOR: SCORE: 1666.58

## 2020-10-26 NOTE — ED AVS SNAPSHOT
[facility] Emergency Dept  [address] [address]  Phone: [phone]  Fax: [phone]                                    Maddy Ortiz   MRN: [id number]    Department: [facility] Emergency Dept   Date of Visit: 10/26/2020           After Visit Summary Signature Page    I have received my discharge instructions, and my questions have been answered. I have discussed any challenges I see with this plan with the nurse or doctor.    ..........................................................................................................................................  Patient/Patient Representative Signature      ..........................................................................................................................................  Patient Representative Print Name and Relationship to Patient    ..................................................               ................................................  Date                                   Time    ..........................................................................................................................................  Reviewed by Signature/Title    ...................................................              ..............................................  Date                                               Time          22EPIC Rev 08/18

## 2020-10-26 NOTE — ED PROVIDER NOTES
"  History     Chief Complaint   Patient presents with     Vaginal Bleeding     Pt c/o vaginal bleeding that started Friday - denies pregnancy - states, \"I took several home pregnancy tests and they were negative     HPI  Maddy Ortiz is a 34 year old female who presents for evaluation for vaginal bleeding.  Patient on arrival reports she has had bleeding ongoing for the last 3 days. Patient arrived by car with a friend (Jay Jay) from [place], [place] reporting she began bleeding and passing finger length like clots after her IUD was removed 4 days earlier.  She reports her IUD had been in place for about 3 years but she developed bleeding and ultimately elected to have the IUD removed.  She reports no abnormal vaginal discharge.  She reports a history of heavy and abnormal/irregular vaginal bleeding.  She is a G1, P1.  She reports lower abdominal cramping.  She reports since passing clots after IUD removal she has began to feel weak and that her legs felt shaky and reports lower abdominal cramping.  She is uncertain what her blood type is.  She reports no fever chills and no urinary symptoms.  Noted in triage that she took home pregnancy test that was negative.  She reported she had taking some ibuprofen for lower abdominal cramping which has been helpful.   Patient has multiple medical diagnoses including history of paranoid type delusional disorder, bipolar 1 disorder, history of psychosis, history of depression with anxiety, history of allergic rhinitis, gastroesophageal reflux disease without esophagitis.    Patient is currently prescribed hydroxyzine, aviane, Nasonex, Cogentin as needed, melatonin 10 mg at night as needed, sertraline 150 mg daily, vraylar,       Allergies:  Allergies   Allergen Reactions     Animal Dander      Other reaction(s): *Unknown     Metformin Other (See Comments)     fatigue     Mold      Trees        Problem List:    Patient Active Problem List    Diagnosis Date Noted     Obesity (BMI " 35.0-39.9) with comorbidity (H) 01/02/2019     Priority: Medium     Paranoia (psychosis) (H) 08/24/2018     Priority: Medium     Gastroesophageal reflux disease without esophagitis 06/08/2018     Priority: Medium     Schizoaffective disorder, bipolar type (H) 05/07/2018     Priority: Medium     Encounter for IUD insertion 09/15/2017     Priority: Medium     inserted 9/15/17 by Krista Keller MD    LOT: [id number]  Exp: 04/20       Seasonal allergic rhinitis due to pollen 11/04/2016     Priority: Medium     Allergic rhinitis due to mold 11/04/2016     Priority: Medium     Allergic rhinitis due to animal dander 11/04/2016     Priority: Medium     Allergic rhinitis due to dust mite 11/04/2016     Priority: Medium     Depression with anxiety 01/26/2015     Priority: Medium     Insomnia 07/18/2013     Priority: Medium     Bipolar 1 disorder (H) 12/06/2012     Priority: Medium     Planning on seeing Purvi (psychiatric nurse)       Paranoid type delusional disorder (H) 11/14/2012     Priority: Medium     Psychosis (H) 10/16/2012     Priority: Medium     Animal dander allergy 05/09/2012     Priority: Medium     Dog and Cat       CARDIOVASCULAR SCREENING; LDL GOAL LESS THAN 160 05/09/2012     Priority: Medium        Past Medical History:    Past Medical History:   Diagnosis Date     Bipolar 1 disorder (H) 12/6/2012     Depressive disorder      Paranoid type delusional disorder (H) 11/14/2012       Past Surgical History:    Past Surgical History:   Procedure Laterality Date     TONSILLECTOMY & ADENOIDECTOMY      as a child       Family History:    Family History   Adopted: Yes   Problem Relation Age of Onset     Unknown/Adopted Mother      Unknown/Adopted Father      Unknown/Adopted Other        Social History:  Marital Status:  Single [1]  Social History     Tobacco Use     Smoking status: Never Smoker     Smokeless tobacco: Never Used     Tobacco comment: around 2nd hand smoke   Substance Use Topics     Alcohol use: Not  "Currently     Comment: rare wine ( very rare)     Drug use: No        Medications:         azelastine (ASTELIN) 0.1 % nasal spray       benztropine (COGENTIN) 0.5 MG tablet       EPINEPHrine (EPIPEN/ADRENACLICK/OR ANY BX GENERIC EQUIV) 0.3 MG/0.3ML injection 2-pack       hydrOXYzine (ATARAX) 10 MG tablet       mometasone (NASONEX) 50 MCG/ACT nasal spray       sertraline (ZOLOFT) 100 MG tablet       VRAYLAR 4.5 MG CAPS capsule       levonorgestrel-ethinyl estradiol (AVIANE) 0.1-20 MG-MCG tablet       Melatonin 10 MG TABS tablet       ORDER FOR ALLERGEN IMMUNOTHERAPY       ORDER FOR ALLERGEN IMMUNOTHERAPY       ORDER FOR ALLERGEN IMMUNOTHERAPY       ORDER FOR ALLERGEN IMMUNOTHERAPY       Pseudoephedrine HCl (SUDAFED PO)          Review of Systems   Constitutional: Negative.    HENT: Negative.    Eyes: Negative.    Respiratory: Negative.    Cardiovascular: Negative.    Gastrointestinal: Positive for abdominal pain (lower abdominal cramping).   Endocrine: Negative.    Genitourinary: Positive for vaginal bleeding.   Musculoskeletal: Negative.    Skin: Negative.    Allergic/Immunologic: Negative.    Neurological: Negative.    Hematological: Negative.    Psychiatric/Behavioral: Negative.    All other systems reviewed and are negative.      Physical Exam   BP: (!) 140/101  Pulse: 102  Temp: 98.1  F (36.7  C)  Resp: 16  Height: 160 cm (5' 3\")  Weight: 100.2 kg (221 lb)  SpO2: 98 %      Physical Exam  Exam conducted with a chaperone present.   Constitutional:       General: She is not in acute distress.     Appearance: She is not ill-appearing, toxic-appearing or diaphoretic.   HENT:      Head: Normocephalic and atraumatic.      Nose: Nose normal. No congestion or rhinorrhea.   Eyes:      General: No scleral icterus.        Right eye: No discharge.         Left eye: No discharge.      Extraocular Movements: Extraocular movements intact.      Pupils: Pupils are equal, round, and reactive to light.   Neck:      Musculoskeletal: " Normal range of motion and neck supple. No neck rigidity or muscular tenderness.      Vascular: No carotid bruit.   Cardiovascular:      Pulses: Normal pulses.      Heart sounds: Normal heart sounds. No murmur. No gallop.    Pulmonary:      Effort: Pulmonary effort is normal. No respiratory distress.      Breath sounds: Normal breath sounds. No stridor. No wheezing, rhonchi or rales.   Chest:      Chest wall: No tenderness.   Genitourinary:     Exam position: Lithotomy position.      Labia:         Right: No rash, tenderness, lesion or injury.         Left: No rash, tenderness, lesion or injury.       Vagina: No vaginal discharge.      Cervix: Cervical bleeding present. No discharge, friability, lesion or erythema.   Musculoskeletal:         General: No swelling, tenderness, deformity or signs of injury.      Left lower leg: No edema.   Lymphadenopathy:      Cervical: No cervical adenopathy.   Skin:     Capillary Refill: Capillary refill takes less than 2 seconds.      Coloration: Skin is not jaundiced or pale.      Findings: No bruising, erythema, lesion or rash.   Neurological:      General: No focal deficit present.      Mental Status: She is alert and oriented to person, place, and time.      Cranial Nerves: No cranial nerve deficit.      Sensory: No sensory deficit.      Motor: No weakness.      Coordination: Coordination normal.      Gait: Gait normal.      Deep Tendon Reflexes: Reflexes normal.   Psychiatric:         Mood and Affect: Mood normal.         Behavior: Behavior normal.         Thought Content: Thought content normal.         Judgment: Judgment normal.         ED Course        Procedures               Critical Care time:  none               ED medications: none    ED vitals:  Vitals:    10/26/20 1507 10/26/20 1834   BP: (!) 140/101 (!) 142/95   Pulse: 102    Resp: 16 14   Temp: 98.1  F (36.7  C) 98  F (36.7  C)   TempSrc: Temporal Oral   SpO2: 98%    Weight: 100.2 kg (221 lb)    Height: 1.6 m (5'  "3\")        ED labs and imaging:  Results for orders placed or performed during the hospital encounter of 10/26/20   US Pelvis Cmplt w Transvag & Doppler LmtPel Duplex Limited     Status: None (Preliminary result)    Narrative    PELVIC ULTRASOUND WITH ENDOVAGINAL TRANSDUCER    10/26/2020 5:36 PM     HISTORY: Lower abdominal cramping and discomfort. Vaginal bleeding  status post intrauterine device (IUD) removal 4 days prior. History of  abnormal vaginal bleeding. Evaluate for acute  process.    TECHNIQUE:  Endovaginal sonography was added to the transabdominal  exam.    COMPARISON: Pelvic MRI on 12/27/2019, CT abdomen pelvis on 7/20/2016  and pelvic ultrasound on 1/11/2016    FINDINGS:   Endometrium: Endometrium is 4 mm thick.    Uterus: Measures 9.0 x 5.1 x 6.2 cm. No uterine fibroids.    Right ovary: Measures 2.4 x 2.1 x 3.0 cm. It demonstrates normal  follicular structure and flow.    Left ovary: Measures 2.3 x 1.9 x 2.2 cm. It demonstrates normal  follicular structure and flow.    Additional findings: No significant free fluid in the pelvis.      Impression    IMPRESSION:  Normal pelvic ultrasound.   HCG qualitative pregnancy (blood)     Status: None   Result Value Ref Range    HCG Qualitative Serum Negative NEG^Negative   CBC with platelets differential     Status: Abnormal   Result Value Ref Range    WBC 12.0 (H) 4.0 - 11.0 10e9/L    RBC Count 4.91 3.8 - 5.2 10e12/L    Hemoglobin 14.4 11.7 - 15.7 g/dL    Hematocrit 43.1 35.0 - 47.0 %    MCV 88 78 - 100 fl    MCH 29.3 26.5 - 33.0 pg    MCHC 33.4 31.5 - 36.5 g/dL    RDW 11.9 10.0 - 15.0 %    Platelet Count 264 150 - 450 10e9/L    Diff Method Automated Method     % Neutrophils 53.8 %    % Lymphocytes 33.8 %    % Monocytes 8.2 %    % Eosinophils 3.2 %    % Basophils 0.5 %    % Immature Granulocytes 0.5 %    Nucleated RBCs 0 0 /100    Absolute Neutrophil 6.5 1.6 - 8.3 10e9/L    Absolute Lymphocytes 4.1 0.8 - 5.3 10e9/L    Absolute Monocytes 1.0 0.0 - 1.3 10e9/L "    Absolute Eosinophils 0.4 0.0 - 0.7 10e9/L    Absolute Basophils 0.1 0.0 - 0.2 10e9/L    Abs Immature Granulocytes 0.1 0 - 0.4 10e9/L    Absolute Nucleated RBC 0.0    ABO/Rh type and screen     Status: None   Result Value Ref Range    ABO A     RH(D) Pos     Antibody Screen Neg     Test Valid Only At [facility]        Specimen Expires 10/29/2020            Assessments & Plan (with Medical Decision Making)   Assessment Summary and Clinical Impression: 34-year-old female  with multiple medical diagnoses who presented to the department with report of 4-day history of vaginal bleeding after IUD removal. She reported a history of irregular-heavy menstrual bleeding.  Patient's bleeding may be related to her recent IUD removal after reassuring pelvic ultrasound and unremarkable blood work.  She is discharged home with close outpatient follow-up with low threshold to return for reevaluation.   Patient reported IUD had been in place for about 3 years prior to his removal- 4 days prior. Patient reported she elected to have it removed because she was bleeding.  She arrived by car from home stating she developed lower abdominal cramping and felt like she was feeling weak.  She was in no acute distress and noted to be tachycardic- (HR-102) on arrival in triage blood pressure was 140/101.  High BMI, abdomen soft non-tender no rebound or guarding.  Chaperoned pelvic exam with Olga Torres RN- revealed no labial lacerations or ulcerations.  Speculum exam revealed scant bleeding from the cervix without large clots and no brisk of bright red blood per vagina. No vaginal discharge.    ED course and Plan:  Reviewed the medical record.  Reviewed nurse triage phone conversation prior to arrival in the department.  Patient's last hemogram was from 2020 when she had a normal hemoglobin hematocrit.  She was tested for COVID-19 on 2020 which was negative. Reviewed office visit on ,  2020-(IUD removal) , and September 28, 2020- placed on (aviane- OCP).  We discussed possible causes for her ongoing bleeding including expected course after IUD removal, with her history of irregular bleeding although she reported having a negative pregnancy at home we agreed to a pelvic ultrasound to exclude other potential causes for abnormal bleeding.  HCG obtained and CBC as patient reported feeling weak and was noted to be tachycardic on arrival although not hypotensive.  She also did not know her blood types of blood type was obtained.   Work-up today revealed chronic leukocytosis- (12.0) was 12.4 in August 2020. Normal hemogram.  Negative serum hCG.  Her blood type is A+.  Pelvic ultrasound today revealed no acute process.  See additional details in the radiology report above.  Patient was reassured by her evaluation and expressed comfort going home with plan for close outpatient follow-up.  We discussed and reviewed reasons to return to the department to be reevaluated she expressed understanding and agreement of plan of care            Disclaimer: This note consists of symbols derived from keyboarding, dictation and/or voice recognition software. As a result, there may be errors in the script that have gone undetected. Please consider this when interpreting information found in this chart.  I have reviewed the nursing notes.    I have reviewed the findings, diagnosis, plan and need for follow up with the patient.       New Prescriptions    No medications on file       Final diagnoses:   Vaginal bleeding - after IUD removal 4 days prior   History of irregular menstrual bleeding       10/26/2020   [facility] EMERGENCY DEPT     Frederick Tony MD  10/27/20 0216

## 2020-10-26 NOTE — TELEPHONE ENCOUNTER
Reason for call:  Patient reporting a symptom    Symptom or request: Patient is acalling about fingure size clots her mentral cycle started the she said she has had her period since September 28th saw a doctor on October 22nd to have her IUD removed. Since removed she has had heavy periods and more cramping.    Duration (how long have symptoms been present): 1 months    Have you been treated for this before? Yes      Phone Number patient can be reached at:  Home number on file [phone] (home)    Best Time:  any    Can we leave a detailed message on this number:  YES    Call taken on 10/26/2020 at 1:40 PM by Barb Ramos

## 2020-10-26 NOTE — TELEPHONE ENCOUNTER
"    Additional Information    Negative: SEVERE vaginal bleeding (e.g., continuous red blood from vagina, or large blood clots) and very weak (can't stand)    Negative: Passed out (i.e., fainted, collapsed and was not responding)    Negative: Difficult to awaken or acting confused (e.g., disoriented, slurred speech)    Negative: Shock suspected (e.g., cold/pale/clammy skin, too weak to stand, low BP, rapid pulse)    Negative: Sounds like a life-threatening emergency to the triager    Negative: Pregnant > 20 weeks (5 months or more)    Negative: Pregnant < 20 weeks (less than 5 months)    Negative: Postpartum (from 0 to 6 weeks after delivery)    Negative: Vaginal discharge is the main symptom and bleeding is slight    Negative: SEVERE abdominal pain (e.g., excruciating)    Negative: SEVERE dizziness (e.g., unable to stand, requires support to walk, feels like passing out now)    SEVERE vaginal bleeding (i.e., soaking 2 pads or tampons per hour and present 2 or more hours; 1 menstrual cup every 2 hours)    Answer Assessment - Initial Assessment Questions  1. AMOUNT: \"Describe the bleeding that you are having.\"     - SPOTTING: spotting, or pinkish / brownish mucous discharge; does not fill panti-liner or pad     - MILD:  less than 1 pad / hour; less than patient's usual menstrual bleeding    - MODERATE: 1-2 pads / hour; 1 menstrual cup every 6 hours; small-medium blood clots (e.g., pea, grape, small coin)    - SEVERE: soaking 2 or more pads/hour for 2 or more hours; 1 menstrual cup every 2 hours; bleeding not contained by pads or continuous red blood from vagina; large blood clots (e.g., golf ball, large coin)       Disposable underwear - large coin  2. ONSET: \"When did the bleeding begin?\" \"Is it continuing now?\"      9/27/20 but this weekend for the past 3 days bleeding heavier.  3. MENSTRUAL PERIOD: \"When was the last normal menstrual period?\" \"How is this different than your period?\"      9/1/20.  4. REGULARITY: " "\"How regular are your periods?\"      IUD removed on Thursday.  5. ABDOMINAL PAIN: \"Do you have any pain?\" \"How bad is the pain?\"  (e.g., Scale 1-10; mild, moderate, or severe)    - MILD (1-3): doesn't interfere with normal activities, abdomen soft and not tender to touch     - MODERATE (4-7): interferes with normal activities or awakens from sleep, tender to touch     - SEVERE (8-10): excruciating pain, doubled over, unable to do any normal activities       mild  6. PREGNANCY: \"Could you be pregnant?\" \"Are you sexually active?\" \"Did you recently give birth?\"      no  7. BREASTFEEDING: \"Are you breastfeeding?\"      non  8. HORMONES: \"Are you taking any hormone medications, prescription or OTC?\" (e.g., birth control pills, estrogen)      Stopped the birth control  9. BLOOD THINNERS: \"Do you take any blood thinners?\" (e.g., Coumadin/warfarin, Pradaxa/dabigatran, aspirin)      none  10. CAUSE: \"What do you think is causing the bleeding?\" (e.g., recent gyn surgery, recent gyn procedure; known bleeding disorder, cervical cancer, polycystic ovarian disease, fibroids)          IUD was removed 4 days ago  11. HEMODYNAMIC STATUS: \"Are you weak or feeling lightheaded?\" If so, ask: \"Can you stand and walk normally?\"         Weak and light headed.  12. OTHER SYMPTOMS: \"What other symptoms are you having with the bleeding?\" (e.g., passed tissue, vaginal discharge, fever, menstrual-type cramps)        Legs sore, back pain and cramping.    Protocols used: VAGINAL BLEEDING - [id number]      "

## 2020-10-26 NOTE — DISCHARGE INSTRUCTIONS
1) Your evaluation today does not suggest an emergency diagnosis.  The cause of your bleeding after IUD removal is likely related to IUD removal as noted by your treating provider.    2) Your evaluation does not suggest significant blood loss as your hemoglobin is normal. Pelvic ultrasound also revealed no complications from IUD placement and removal and no evidence for abnormality about the uterus or ovaries with normal blood flow    3) we have agreed that you are stable for discharge to home with plan to follow-up with your clinic or treating provider in the next 3 to 5 days.  Continue taking  birth control as prescribed.  Although you appear stable for discharge to home if you develop new symptoms of concern including fever, chills, increased abdominal pain or cramping you should return to the department to be reevaluated

## 2020-10-27 ENCOUNTER — OFFICE VISIT (OUTPATIENT)
Dept: FAMILY MEDICINE | Facility: CLINIC | Age: 35
End: 2020-10-27
Payer: MEDICARE

## 2020-10-27 VITALS
TEMPERATURE: 98.5 F | DIASTOLIC BLOOD PRESSURE: 92 MMHG | SYSTOLIC BLOOD PRESSURE: 130 MMHG | OXYGEN SATURATION: 96 % | HEART RATE: 99 BPM

## 2020-10-27 DIAGNOSIS — R05.9 COUGH: ICD-10-CM

## 2020-10-27 DIAGNOSIS — Z20.822 SUSPECTED COVID-19 VIRUS INFECTION: Primary | ICD-10-CM

## 2020-10-27 DIAGNOSIS — J02.9 SORE THROAT: ICD-10-CM

## 2020-10-27 DIAGNOSIS — N93.9 ABNORMAL UTERINE BLEEDING (AUB): ICD-10-CM

## 2020-10-27 PROCEDURE — U0003 INFECTIOUS AGENT DETECTION BY NUCLEIC ACID (DNA OR RNA); SEVERE ACUTE RESPIRATORY SYNDROME CORONAVIRUS 2 (SARS-COV-2) (CORONAVIRUS DISEASE [COVID-19]), AMPLIFIED PROBE TECHNIQUE, MAKING USE OF HIGH THROUGHPUT TECHNOLOGIES AS DESCRIBED BY CMS-2020-01-R: HCPCS | Performed by: FAMILY MEDICINE

## 2020-10-27 PROCEDURE — 99214 OFFICE O/P EST MOD 30 MIN: CPT | Performed by: FAMILY MEDICINE

## 2020-10-27 ASSESSMENT — ENCOUNTER SYMPTOMS
ABDOMINAL PAIN: 0
CHILLS: 0
FEVER: 0
SHORTNESS OF BREATH: 0
COUGH: 1
SORE THROAT: 1

## 2020-10-27 NOTE — PROGRESS NOTES
Subjective       HPI            AUB  34 year old female who initially presented to the clinic for concerns of abnormal uterine bleeding after IUD removal on 10/22. She presented to the ED yesterday and underwent further testing with a Pelvic US and also CBC ( hemoglobin 14.4) which returned satisfactory. She is currently on her off week from her OCP (Aviane) and is planning to restart it this upcoming Sunday. She denies any abdominal pain or tenderness but still continues to have bleeding at this time.     Cough/ sore throat  Of note, patient was immediately roomed due to reporting symptoms of sore throat which started yesterday. She has not had any fevers or chills. Occasional dry cough which she believes is related to being around animals and allergies. No respiratory distress or chest pain. She does not work in healthcare and is unsure if had any exposure to anyone with COVID.     Review of Systems   Constitutional: Negative for chills and fever.   HENT: Positive for sore throat.    Respiratory: Positive for cough. Negative for shortness of breath.    Cardiovascular: Negative for chest pain.   Gastrointestinal: Negative for abdominal pain.   Genitourinary: Positive for vaginal bleeding.      LMP 10/26/2020   There is no height or weight on file to calculate BMI.   BP (!) 130/92   Pulse 99   Temp 98.5  F (36.9  C)   LMP 10/26/2020   SpO2 96%     Physical Exam   GENERAL: No distress.   HEENT: NC/AT. Oropharynx clear and without erythema. Tonsils non-enlarged.   NECK: no adenopathy, no asymmetry, masses, or scars and thyroid normal to palpation  RESP: lungs clear to auscultation - no rales, rhonchi or wheezes  CV: regular rate and rhythm, normal S1 S2, no S3 or S4, no murmur, click or rub, no peripheral edema   ABDOMEN: soft, nontender, no masses and bowel sounds normal  MS: no gross musculoskeletal defects noted, no edema        Assessment & Plan   Suspected COVID-19 virus infection   Sore throat  Cough  -  High suspicion for COVID at this time. Patient is hemodynamically stable and does not require emergency care or hospitalization at this time.   - Symptomatic COVID-19 Virus (Coronavirus) by PCR  - Covid testing completed today.  - Advised symptomatic cares.   - Reasons to present to ED discussed.   Home Isolation for Known or Suspected COVID    1. Stay home.  Do not use public transportation or ride share ([employer], Ly[employer])  2. Separate yourself from people in your house.  As much as possible, you should stay in a specific room and away from other people in her home.  Also use a separate bathroom, if available.    3. Wear a facemask if you need to be around other people and cover your mouth and nose with the tissue when cough or sneeze  4. Avoid sharing personal household items.  You should not share dishes, drinking glasses, cups, eating utensils, towels, or bedding with other people in your home.  After using these items, they should be washed thoroughly with soap and water.  Clean all high touch surfaces in your home daily  5. Wash your hands often with soap and water for at least 20 seconds, or used an alcohol based hand  containing 60% to 95% alcohol.  Avoid touching your face with unwashed hands.  6. Watch for worsening symptoms, shortness of breath, or difficulty breathing.  7. If you need medical care, contact your healthcare provider.  If you need emergency medical attention during this time, call 911 and let them know you are being tested for COVID-19.  8. If you have any questions please contact our clinic directly or [facility] at [phone].    Abnormal uterine bleeding (AUB)  - Continues to have vaginal bleeding since removal of IUD 4 days ago. She is not currently taking her OCP Aviane as its her off week. She will start taking her OCP Aviane to see if this helps with her uterine bleeding. If still having issues could consider switching formulations to a different OCP.  - If  patient's symptoms persist patient will make a follow up appointment and reassess.  - Would consider endometrial biopsy or referral to gynecology as next steps.    - It is reassuring hemoglobin was 14.4 and Pelvic US was unremarkable which was completed yesterday 10/26/2020    Kolton Kapoor [facility]

## 2020-10-28 LAB
SARS-COV-2 RNA SPEC QL NAA+PROBE: NOT DETECTED
SPECIMEN SOURCE: NORMAL

## 2020-10-29 ENCOUNTER — TELEPHONE (OUTPATIENT)
Dept: FAMILY MEDICINE | Facility: CLINIC | Age: 35
End: 2020-10-29

## 2020-10-29 NOTE — TELEPHONE ENCOUNTER
RN received below msg from provider:  Kolton Kapoor, Jovanna Grove RN   Caller: Unspecified (Today,  8:42 AM)             Regarding the patient's sore throat and ear being plugged if this is concerning enough for patient she should make a return appointment and be seen in clinic for evaluation. I can see patient tomorrow in clinic if she desires. COVID testing was negative which is great.     I do not see common side effects of milk thistle being dry throat.     Thanks,     Kolton Kapoor

## 2020-10-29 NOTE — TELEPHONE ENCOUNTER
Reason for call:    Symptom or request:     Patient called asking to speak with a nurse regarding her allergies.       Best Time:  any    Can we leave a detailed message on this number?  YES     Judith Carmichael

## 2020-10-29 NOTE — TELEPHONE ENCOUNTER
"Patient had OV 10-27-20 for sore throat.  Covid test ordered - negative.  Patient's sore throat continues (left side, right side no pain) - same, denies worsening.  She is hydrating well and throat lozenges help with the discomfort.  She reports new left ear discomfort, \"may be plugged\" - denies pain.    Patient would like to know if you think milk thistle (has been taking for about a month) is causing her dry throat?  The bottle says do not take if you have allergies to ragweed and/or dandelions.  She gets allergy injections that contain ragweed.  Ragweed is not a listed allergy in Epic and she is not certain if she has an actual allergy to ragweed.    Thoughts?    Routing to provider.  Jovanna HERNANDEZ RN, BSN         "

## 2020-10-30 ENCOUNTER — OFFICE VISIT (OUTPATIENT)
Dept: FAMILY MEDICINE | Facility: CLINIC | Age: 35
End: 2020-10-30
Payer: MEDICARE

## 2020-10-30 VITALS
DIASTOLIC BLOOD PRESSURE: 88 MMHG | BODY MASS INDEX: 39.15 KG/M2 | WEIGHT: 221 LBS | HEART RATE: 112 BPM | TEMPERATURE: 98.4 F | OXYGEN SATURATION: 98 % | SYSTOLIC BLOOD PRESSURE: 122 MMHG | RESPIRATION RATE: 12 BRPM

## 2020-10-30 DIAGNOSIS — J02.0 STREPTOCOCCAL SORE THROAT: Primary | ICD-10-CM

## 2020-10-30 DIAGNOSIS — J02.9 SORE THROAT: ICD-10-CM

## 2020-10-30 LAB
DEPRECATED S PYO AG THROAT QL EIA: NEGATIVE
SPECIMEN SOURCE: NORMAL

## 2020-10-30 PROCEDURE — 99N1174 PR STATISTIC STREP A RAPID: Performed by: FAMILY MEDICINE

## 2020-10-30 PROCEDURE — 87651 STREP A DNA AMP PROBE: CPT | Performed by: FAMILY MEDICINE

## 2020-10-30 PROCEDURE — 99213 OFFICE O/P EST LOW 20 MIN: CPT | Performed by: FAMILY MEDICINE

## 2020-10-30 RX ORDER — NAPROXEN 500 MG/1
500 TABLET ORAL 2 TIMES DAILY WITH MEALS
Qty: 20 TABLET | Refills: 0 | Status: CANCELLED | OUTPATIENT
Start: 2020-10-30 | End: 2020-11-09

## 2020-10-30 RX ORDER — NAPROXEN 500 MG/1
500 TABLET ORAL 2 TIMES DAILY WITH MEALS
Qty: 20 TABLET | Refills: 0 | Status: SHIPPED | OUTPATIENT
Start: 2020-10-30 | End: 2020-11-09

## 2020-10-30 NOTE — PROGRESS NOTES
Subjective     Maddy Ortiz is a 34 year old female who presents to clinic today for the following health issues:    HPI          Sore Throat and Left Ear Pain    Follow up from 10/27/20 evaluation, COVID test was Negative.    Symptoms increasing.     Sore throat has increased, increased fatigue, left internal ear pain (no drainage). No hearing loss.     States that this is effecting her mental health.    States that she spoke to her mental health provider about if new medication dose is causing fatigue.     Has tried Dayquil, tylenol, IBU. States not helpful.      Review of Systems   Constitutional: Negative fevers, chills,  HEENT: Negative vision changes, hearing changes, difficulty with swallowing.  CV: Negative chest pain, palpitations.  Resp: Negative shortness of breath, significant cough, wheezing  GI: Negative nausea, vomiting, diarrhea, constipation, blood in stool      Objective    /88   Pulse 112   Temp 98.4  F (36.9  C) (Tympanic)   Resp 12   Wt 100.2 kg (221 lb)   LMP 10/26/2020   SpO2 98%   BMI 39.15 kg/m    Body mass index is 39.15 kg/m .  Physical Exam   General: alert, cooperative, no acute distress   HEENT: NC/AT, PERRL. Ears: External canals patent bilaterally. TM's intact with no erythema and in neutral position. No mastoid pain. Oropharynx mildly erythematous with 1+ tonsils bilaterally.   Neck: Supple, mild tenderness over anterior cervical chain on the left.   CV: RRR, no murmur  Resp: non-labored breathing          Assessment & Plan     Sore throat  - Centor score of 2. Will proceed with testing. If positive will treat with Amoxicillin 500 mg twice daily for 10 days.   - Discussed symptomatic cares including warm tea, honey, throat lozenges, and NSAID's.  Prescription for Naproxen 500 mg twice daily for 10 days provided. Advised to take with food. Medication risk, benefits, and side effects discussed. Advised to not take with any other NSAID's.   - Streptococcus A Rapid Scr w  Reflx to PCR- negative.   - Group A Streptococcus PCR Throat Swab  - naproxen (NAPROSYN) 500 MG tablet  Dispense: 20 tablet; Refill: 0         Follow up as needed.     Kolton Kapoor DO  [facility]

## 2020-10-30 NOTE — PATIENT INSTRUCTIONS
Patient Education     When You Have a Sore Throat  A sore throat can be painful. There are many reasons why you may have a sore throat. Your healthcare provider will work with you to find the cause of your sore throat. He or she will also find the best treatment for you.     What causes a sore throat?  Sore throats can be caused or worsened by:    Cold or flu viruses    Bacteria    Irritants such as tobacco smoke or air pollution    Acid reflux  A healthy throat  The tonsils are on the sides of the throat near the base of the tongue. They collect viruses and bacteria and help fight infection. The throat (pharynx) is the passage for air. Mucus from the nasal cavity also moves down the passage.  An inflamed throat  The tonsils and pharynx can become inflamed due to a cold or flu virus. Postnasal drip (excess mucus draining from the nasal cavity) can irritate the throat. It can also make the throat or tonsils more likely to be infected by bacteria. Severe, untreated tonsillitis in children or adults can cause a pocket of pus (abscess) to form near the tonsil.  Your evaluation  A medical evaluation can help find the cause of your sore throat. It can also help your healthcare provider choose the best treatment for you. The evaluation may include a health history, physical exam, and diagnostic tests.  Health history  Your healthcare provider may ask you the following:    How long has the sore throat lasted and how have you been treating it?    Do you have any other symptoms, such as body aches, fever, or cough?    Does your sore throat recur? If so, how often? How many days of school or work have you missed because of a sore throat?    Do you have trouble eating or swallowing?    Have you been told that you snore or have other sleep problems?    Do you have bad breath?    Do you cough up bad-tasting mucus?  Physical exam  During the exam, your healthcare provider checks your ears, nose, and throat for problems. He or she  also checks for swelling in the neck, and may listen to your chest.  Possible tests  Other tests your healthcare provider may perform include:    A throat swab to check for bacteria such as streptococcus (the bacteria that causes strep throat)    A blood test to check for mononucleosis (a viral infection)    A chest X-ray to rule out pneumonia, especially if you have a cough  Treating a sore throat  Treatment depends on many factors. What is the likely cause? Is the problem recent? Does it keep coming back? In many cases, the best thing to do is to treat the symptoms, rest, and let the problem heal itself. Antibiotics may help clear up some bacterial infections. For cases of severe or recurring tonsillitis, the tonsils may need to be removed.  Relieving your symptoms    Don t smoke, and stay away from secondhand smoke.    For children, try throat sprays or frozen ice pops. Adults and older children may try lozenges.    Drink warm liquids to soothe the throat and help thin mucus. Stay away from alcohol, spicy foods, and acidic drinks such as orange juice. These can irritate the throat.    Gargle with warm saltwater ( 1 teaspoon of salt to  8 ounces of warm water).    Use a humidifier to keep air moist and relieve throat dryness.    Try over-the-counter pain relievers such as acetaminophen or ibuprofen. Use as directed, and don t exceed the recommended dose. Don t give aspirin to children under age17.    Are antibiotics needed?  If your sore throat is due to a bacterial infection, antibiotics may speed healing and prevent complications. Although group A streptococcus (strep throat) is the major treatable infection for a sore throat, strep throat causes only 5% to 15% of sore throats in adults who seek medical care. Most sore throats are caused by cold or flu viruses. And antibiotics don t treat viral illness. In fact, using antibiotics when they re not needed may lead to bacteria that are harder to kill. Your  healthcare provider will prescribe antibiotics only if he or she thinks they are likely to help.  If antibiotics are prescribed  Take the medicine exactly as directed. Be sure to finish your prescription even if you re feeling better. Ask your healthcare provider or pharmacist what side effects are common and what to do about them.  Is surgery needed?  In some cases, tonsils need to be removed. This is often done as outpatient (same-day) surgery. Your healthcare provider may advise removing the tonsils in cases of:    Several severe bouts of tonsillitis in a year.  Severe  episodes include those that lead to missed days of school or work, or that need to be treated with antibiotics.    Tonsillitis that causes breathing problems during sleep    Tonsillitis caused by food particles collecting in pouches in the tonsils (cryptic tonsillitis)  When to call your healthcare provider  Call your healthcare provider immediately if any of the following occur:    Problems swallowing    Symptoms worsen, or new symptoms develop.    Swollen tonsils make breathing difficult.    The pain is severe enough to keep you from drinking liquids.    If a skin rash or hives, develops, call your healthcare provider immediately. Any of these could signal an allergic reaction to antibiotics.    Symptoms don t improve within a week.    Symptoms don t improve within  2 to 3  days of starting antibiotics.  Call 911  Call 911 if any of the following occur:    Trouble breathing or problems catching your breath may be a medical emergency.    Skin is blue, purple or gray in color    Trouble talking    Feeling dizzy or faint    Feeling of doom  Lola last reviewed this educational content on 7/1/2019 2000-2020 [employer]. [address] [place] [address], [place] [address]. All rights reserved. This information is not intended as a substitute for professional medical care. Always follow your healthcare professional's  instructions.    Naproxen 500 mg ( 1 tablet) twice daily for 10 days   IF have strep throat will send antibiotic to [facility].

## 2020-10-30 NOTE — NURSING NOTE
"Initial /88   Pulse 112   Temp 98.4  F (36.9  C) (Tympanic)   Resp 12   Wt 100.2 kg (221 lb)   LMP 10/26/2020   SpO2 98%   BMI 39.15 kg/m   Estimated body mass index is 39.15 kg/m  as calculated from the following:    Height as of 10/26/20: 1.6 m (5' 3\").    Weight as of this encounter: 100.2 kg (221 lb). .      "

## 2020-10-31 LAB
SPECIMEN SOURCE: NORMAL
STREP GROUP A PCR: NOT DETECTED

## 2020-11-04 ENCOUNTER — THERAPY VISIT (OUTPATIENT)
Dept: CHIROPRACTIC MEDICINE | Facility: CLINIC | Age: 35
End: 2020-11-04
Payer: MEDICARE

## 2020-11-04 DIAGNOSIS — M54.50 CHRONIC BILATERAL LOW BACK PAIN WITHOUT SCIATICA: ICD-10-CM

## 2020-11-04 DIAGNOSIS — M99.01 CERVICAL SEGMENT DYSFUNCTION: ICD-10-CM

## 2020-11-04 DIAGNOSIS — M62.838 SPASM OF MUSCLE: ICD-10-CM

## 2020-11-04 DIAGNOSIS — M54.2 CERVICALGIA: ICD-10-CM

## 2020-11-04 DIAGNOSIS — M99.02 THORACIC SEGMENT DYSFUNCTION: ICD-10-CM

## 2020-11-04 DIAGNOSIS — M99.03 SEGMENTAL DYSFUNCTION OF LUMBAR REGION: ICD-10-CM

## 2020-11-04 DIAGNOSIS — M99.05 SEGMENTAL DYSFUNCTION OF PELVIC REGION: Primary | ICD-10-CM

## 2020-11-04 DIAGNOSIS — G89.29 CHRONIC BILATERAL LOW BACK PAIN WITHOUT SCIATICA: ICD-10-CM

## 2020-11-04 PROCEDURE — 98941 CHIROPRACT MANJ 3-4 REGIONS: CPT | Mod: AT | Performed by: CHIROPRACTOR

## 2020-11-04 NOTE — PROGRESS NOTES
Visit #:  7 of 8, based on treatment plan    Subjective:  Maddy Ortiz is a 34 year old female who is seen in f/u up for:        Segmental dysfunction of pelvic region  Chronic bilateral low back pain without sciatica  Segmental dysfunction of lumbar region  Spasm of muscle  Thoracic segment dysfunction  Cervicalgia  Cervical segment dysfunction.     Since last visit on 10/21/2020,  Maddy Ortiz reports the following changes: Pain immediately after last treatment: 2/10 and their pain level today 2/10.  Maddy reports that she felt better after last treatment. She is feeling a little stiff and sore but overall is feeling better.  She will be starting pt as well for her back pain.    Area of chief complaint:  Cervical and Lumbar :  Symptoms are graded at 2/10. The quality is described as stiff, achey.  Motion has increased, but is still not normal. Patient feels that they are improved due to a reduction in symptoms.        Objective:  The following was observed:    P: palpatory tendernessPiriformis and Traps     A: static palpation demonstrates intersegmental asymmetry , cervical, thoracic, lumbar, pelvis    R: motion palpation notes restricted motion, C6 , C7 , T1 , T2 , T5 , T6 , T7 , L4 , L5  and PSIS Right     T: The following soft tissue hypotonicities were observed:  Piriformis: right, ache, dull pain and stiff, referred pain: no  Traps: bilateral, ache, dull pain and stiff, referred pain: no      Assessment:    Segmental spinal dysfunction/restrictions found at:  C6   C7   T1   T2   T5  T6  T7  L4  L5  PSIS Right    Diagnoses:      1. Segmental dysfunction of pelvic region    2. Chronic bilateral low back pain without sciatica    3. Segmental dysfunction of lumbar region    4. Spasm of muscle    5. Thoracic segment dysfunction    6. Cervicalgia    7. Cervical segment dysfunction        Patient's condition:  Patient had restrictions pre-manipulation    Treatment effectiveness:  Post manipulation there is better  intersegmental movement and Patient claims to feel looser post manipulation      Procedures:  CMT:  26043 Chiropractic manipulative treatment 3-4 regions performed   Cervical: Diversified, C6, C7 , Prone  Thoracic: Diversified, T1, T2, T5, T6, T7, Prone, Supine  Lumbar: Activator, L4, L5, Prone  Pelvis: Drop Table, PSIS Right , Prone    Modalities:  10755: MSTM:  To Piriformis and Traps  for 5 min    Therapeutic procedures:  None      Prognosis: Good    Progress towards Goals: Patient is making progress towards the goal     Response to Treatment:   Reduction in symptoms as reported by patient      Recommendations:    Instructions:  stretch as instructed  and ice 20 minutes every other hour as needed    Follow-up:   Return to care in two weeks

## 2020-11-06 ENCOUNTER — APPOINTMENT (OUTPATIENT)
Dept: GENERAL RADIOLOGY | Facility: CLINIC | Age: 35
End: 2020-11-06
Attending: PHYSICIAN ASSISTANT
Payer: MEDICARE

## 2020-11-06 ENCOUNTER — TELEPHONE (OUTPATIENT)
Dept: FAMILY MEDICINE | Facility: CLINIC | Age: 35
End: 2020-11-06

## 2020-11-06 ENCOUNTER — HOSPITAL ENCOUNTER (EMERGENCY)
Facility: CLINIC | Age: 35
Discharge: HOME | End: 2020-11-06
Attending: PHYSICIAN ASSISTANT | Admitting: PHYSICIAN ASSISTANT
Payer: MEDICARE

## 2020-11-06 ENCOUNTER — VIRTUAL VISIT (OUTPATIENT)
Dept: FAMILY MEDICINE | Facility: OTHER | Age: 35
End: 2020-11-06

## 2020-11-06 ENCOUNTER — TELEPHONE (OUTPATIENT)
Dept: ALLERGY | Facility: CLINIC | Age: 35
End: 2020-11-06

## 2020-11-06 VITALS
SYSTOLIC BLOOD PRESSURE: 148 MMHG | OXYGEN SATURATION: 94 % | TEMPERATURE: 97.5 F | RESPIRATION RATE: 16 BRPM | DIASTOLIC BLOOD PRESSURE: 94 MMHG | BODY MASS INDEX: 39.15 KG/M2 | HEART RATE: 103 BPM | WEIGHT: 221 LBS

## 2020-11-06 DIAGNOSIS — J30.1 CHRONIC SEASONAL ALLERGIC RHINITIS DUE TO POLLEN: ICD-10-CM

## 2020-11-06 DIAGNOSIS — R05.9 COUGH: Primary | ICD-10-CM

## 2020-11-06 DIAGNOSIS — J18.9 PNEUMONIA: ICD-10-CM

## 2020-11-06 LAB
DEPRECATED S PYO AG THROAT QL EIA: NEGATIVE
SPECIMEN SOURCE: NORMAL
SPECIMEN SOURCE: NORMAL
STREP GROUP A PCR: NOT DETECTED

## 2020-11-06 PROCEDURE — U0003 INFECTIOUS AGENT DETECTION BY NUCLEIC ACID (DNA OR RNA); SEVERE ACUTE RESPIRATORY SYNDROME CORONAVIRUS 2 (SARS-COV-2) (CORONAVIRUS DISEASE [COVID-19]), AMPLIFIED PROBE TECHNIQUE, MAKING USE OF HIGH THROUGHPUT TECHNOLOGIES AS DESCRIBED BY CMS-2020-01-R: HCPCS | Performed by: PHYSICIAN ASSISTANT

## 2020-11-06 PROCEDURE — 87651 STREP A DNA AMP PROBE: CPT | Performed by: PHYSICIAN ASSISTANT

## 2020-11-06 PROCEDURE — G0463 HOSPITAL OUTPT CLINIC VISIT: HCPCS | Mod: 25 | Performed by: PHYSICIAN ASSISTANT

## 2020-11-06 PROCEDURE — 999N001174 HC STATISTIC STREP A RAPID: Performed by: PHYSICIAN ASSISTANT

## 2020-11-06 PROCEDURE — C9803 HOPD COVID-19 SPEC COLLECT: HCPCS | Performed by: PHYSICIAN ASSISTANT

## 2020-11-06 PROCEDURE — 99214 OFFICE O/P EST MOD 30 MIN: CPT | Performed by: PHYSICIAN ASSISTANT

## 2020-11-06 PROCEDURE — 71045 X-RAY EXAM CHEST 1 VIEW: CPT

## 2020-11-06 RX ORDER — ALBUTEROL SULFATE 90 UG/1
2 AEROSOL, METERED RESPIRATORY (INHALATION) EVERY 6 HOURS PRN
Qty: 18 G | Refills: 0 | Status: SHIPPED | OUTPATIENT
Start: 2020-11-06 | End: 2021-06-14

## 2020-11-06 RX ORDER — ALBUTEROL SULFATE 90 UG/1
2 AEROSOL, METERED RESPIRATORY (INHALATION) EVERY 4 HOURS PRN
Qty: 1 INHALER | Refills: 0 | Status: SHIPPED | OUTPATIENT
Start: 2020-11-06 | End: 2021-03-19

## 2020-11-06 NOTE — ED PROVIDER NOTES
History     Chief Complaint   Patient presents with     Pharyngitis     tested for covid X 3 (about every 2 weeks).  Continues to have a sore throat, cough and shortness of breath.  Speaking in full sentences     HPI     Maddy Ortiz is a 34 year old female who presents to the urgent care with concern over 2-week history of illness.  She complains of sore throat, cough, shortness of breath.  She is also developed some left ear pain.  She has not had any objective fever, wheezing, nausea, vomiting, diarrhea or abdominal pain.  She does have a history of allergic rhinitis but denies any history of asthma, COPD or other breathing related disorders.  She is a non-smoker.  She does have a prescription for albuterol inhaler which she uses during peak allergy season.  She has used it several times daily since onset of illness.  She is a non-smoker.  Was evaluated approximately 1 week ago and did have negative strep and Covid testing at that time.    Allergies:  Allergies   Allergen Reactions     Animal Dander      Other reaction(s): *Unknown     Metformin Other (See Comments)     fatigue     Mold      Trees      Problem List:    Patient Active Problem List    Diagnosis Date Noted     Obesity (BMI 35.0-39.9) with comorbidity (H) 01/02/2019     Priority: Medium     Paranoia (psychosis) (H) 08/24/2018     Priority: Medium     Gastroesophageal reflux disease without esophagitis 06/08/2018     Priority: Medium     Schizoaffective disorder, bipolar type (H) 05/07/2018     Priority: Medium     Encounter for IUD insertion 09/15/2017     Priority: Medium     inserted 9/15/17 by Krista Keller MD    LOT: [id number]  Exp: 04/20       Seasonal allergic rhinitis due to pollen 11/04/2016     Priority: Medium     Allergic rhinitis due to mold 11/04/2016     Priority: Medium     Allergic rhinitis due to animal dander 11/04/2016     Priority: Medium     Allergic rhinitis due to dust mite 11/04/2016     Priority: Medium     Depression  with anxiety 01/26/2015     Priority: Medium     Insomnia 07/18/2013     Priority: Medium     Bipolar 1 disorder (H) 12/06/2012     Priority: Medium     Planning on seeing Purvi (psychiatric nurse)       Paranoid type delusional disorder (H) 11/14/2012     Priority: Medium     Psychosis (H) 10/16/2012     Priority: Medium     Animal dander allergy 05/09/2012     Priority: Medium     Dog and Cat       CARDIOVASCULAR SCREENING; LDL GOAL LESS THAN 160 05/09/2012     Priority: Medium        Past Medical History:    Past Medical History:   Diagnosis Date     Bipolar 1 disorder (H) 12/6/2012     Depressive disorder      Paranoid type delusional disorder (H) 11/14/2012       Past Surgical History:    Past Surgical History:   Procedure Laterality Date     TONSILLECTOMY & ADENOIDECTOMY      as a child       Family History:    Family History   Adopted: Yes   Problem Relation Age of Onset     Unknown/Adopted Mother      Unknown/Adopted Father      Unknown/Adopted Other        Social History:  Marital Status:  Single [1]  Social History     Tobacco Use     Smoking status: Never Smoker     Smokeless tobacco: Never Used     Tobacco comment: around 2nd hand smoke   Substance Use Topics     Alcohol use: Not Currently     Comment: rare wine ( very rare)     Drug use: No        Medications:         albuterol (PROAIR HFA/PROVENTIL HFA/VENTOLIN HFA) 108 (90 Base) MCG/ACT inhaler       azelastine (ASTELIN) 0.1 % nasal spray       benztropine (COGENTIN) 0.5 MG tablet       EPINEPHrine (EPIPEN/ADRENACLICK/OR ANY BX GENERIC EQUIV) 0.3 MG/0.3ML injection 2-pack       hydrOXYzine (ATARAX) 10 MG tablet       levonorgestrel-ethinyl estradiol (AVIANE) 0.1-20 MG-MCG tablet       Melatonin 10 MG TABS tablet       mometasone (NASONEX) 50 MCG/ACT nasal spray       naproxen (NAPROSYN) 500 MG tablet       ORDER FOR ALLERGEN IMMUNOTHERAPY       ORDER FOR ALLERGEN IMMUNOTHERAPY       ORDER FOR ALLERGEN IMMUNOTHERAPY       ORDER FOR ALLERGEN  IMMUNOTHERAPY       Pseudoephedrine HCl (SUDAFED PO)       sertraline (ZOLOFT) 100 MG tablet       VRAYLAR 4.5 MG CAPS capsule      Review of Systems  CONSTITUTIONAL:POSITIVE  for fatigue, chills, myalgias and NEGATIVE  for fever  INTEGUMENTARY/SKIN: NEGATIVE for worrisome rashes, moles or lesions  EYES: NEGATIVE for vision changes or irritation  ENT/MOUTH: POSITIVE for sore throat, left ear pain   RESP:POSITIVE for cough and shortness of breath NEGATIVE for wheezing  CV: NEGATIVE for chest pain, palpitations or peripheral edema  GI: NEGATIVE for abdominal pain, diarrhea, nausea and vomiting  Physical Exam   BP: (!) 148/94  Pulse: 103  Temp: 97.5  F (36.4  C)  Resp: 16  Weight: 100.2 kg (221 lb)  SpO2: 94 %  Physical Exam  GENERAL APPEARANCE: alert, cooperative no acute distress   EYES: EOMI,  PERRL, conjunctiva clear  HENT: ear canals and TM's normal.  Posterior pharynx nonerythematous without exudate  NECK: supple, nontender, no lymphadenopathy  RESP: Faint crackles at bases bilaterally, no wheezing  CV: regular rates and rhythm, normal S1 S2, no murmur noted  SKIN: no suspicious lesions or rashes  ED Course        Procedures          Critical Care time:  none          Results for orders placed or performed during the hospital encounter of 11/06/20   XR Chest Port 1 View     Status: None    Narrative    XR CHEST PORT 1 VW 11/6/2020 6:33 PM    HISTORY: Shortness of breath    COMPARISON: None.      Impression    IMPRESSION: Left lung base linear airspace opacities could represent  atelectasis or developing infiltrate. No pleural effusion, or  pneumothorax. The cardiac and mediastinal silhouettes are within  normal limits.    SUNIL ROSE MD   Symptomatic COVID-19 Virus (Coronavirus) by PCR     Status: None    Specimen: Nasopharyngeal   Result Value Ref Range    COVID-19 Virus PCR to [facility] - Source Nasopharyngeal     COVID-19 Virus PCR to [facility] - Result Not Detected    Streptococcus A Rapid Scr w Reflx to PCR      Status: None    Specimen: Throat   Result Value Ref Range    Strep Specimen Description Throat     Streptococcus Group A Rapid Screen Negative NEG^Negative   Group A Streptococcus PCR Throat Swab     Status: None    Specimen: Throat   Result Value Ref Range    Specimen Description Throat     Strep Group A PCR Not Detected NDET^Not Detected       Medications - No data to display    Assessments & Plan (with Medical Decision Making)     I have reviewed the nursing notes.    I have reviewed the findings, diagnosis, plan and need for follow up with the patient.       Discharge Medication List as of 11/6/2020  7:23 PM      START taking these medications    Details   !! albuterol (PROAIR HFA/PROVENTIL HFA/VENTOLIN HFA) 108 (90 Base) MCG/ACT inhaler Inhale 2 puffs into the lungs every 6 hours as needed for shortness of breath / dyspnea or wheezing, Disp-18 g, R-0, E-PrescribePharmacy may dispense brand covered by insurance (Proair, or proventil or ventolin or generic albuterol inhaler)      amoxicillin-clavulanate (AUGMENTIN) 875-125 MG tablet Take 1 tablet by mouth 2 times daily for 10 days, Disp-20 tablet, R-0, E-Prescribe       !! - Potential duplicate medications found. Please discuss with provider.          Final diagnoses:   Pneumonia     34-year-old female presents to the urgent care with concern over 2-week history of sore throat, cough, shortness of breath left ear pain.  She had mild tachycardia upon arrival, remainder of vital signs are stable.  Physical exam findings as described above included crackles in the bases bilaterally.  As part of evaluation she did have negative rapid strep test with PCR testing pending, PCR testing for Covid pending.  Chest x-ray did show left lung base linear opacities which could represent atelectasis or developing infiltrate.   Given clinical history we will treat patient presumptively for pneumonia.  Do not suspect mono at this time.  Differential would include bronchitis, I do  not suspect pertussis, PE.  She was discharged home stable with prescription for albuterol inhaler, Augmentin. Follow up with PCP if no improvement in the next 3-5 days.  Worrisome reasons to return to ER/UC sooner discussed.     Disclaimer: This note consists of symbols derived from keyboarding, dictation, and/or voice recognition software. As a result, there may be errors in the script that have gone undetected.  Please consider this when interpreting information found in the chart.     11/6/2020   [facility] EMERGENCY DEPT     Mela Roberts PA-C  11/08/20 1259

## 2020-11-06 NOTE — PROGRESS NOTES
"Date: 2020 13:39:11  Clinician: Brad Genao  Clinician NPI: [id number]  Patient: Maddy Ortiz  Patient : 1985  Patient Address: [address] [place] [id number]  Patient Phone: [phone]  Visit Protocol: URI  Patient Summary:  Maddy is a 34 year old ( : 1985 ) female who initiated a [facility] Visit for COVID-19 (Coronavirus) evaluation and screening. When asked the question \"Please sign me up to receive news, health information and promotions. \", Maddy responded \"Yes\".    Maddy states her symptoms started gradually 2-3 weeks ago.   Her symptoms consist of myalgia, malaise, a sore throat, diarrhea, ear pain, enlarged lymph nodes, and a cough. She is experiencing mild difficulty breathing with activities but can speak normally in full sentences.   Symptom details     Cough: Maddy coughs a few times an hour and her cough is not more bothersome at night. Phlegm does not come into her throat when she coughs. She believes her cough is caused by post-nasal drip.     Sore throat: Maddy reports having moderate throat pain (4-6 on a 10 point pain scale), has exudate on her tonsils, and can swallow liquids. The lymph nodes in her neck are enlarged. A rash has not appeared on the skin since the sore throat started.      Maddy denies having vomiting, rhinitis, facial pain or pressure, chills, teeth pain, ageusia, headache, wheezing, fever, nasal congestion, nausea, and anosmia. She also denies taking antibiotic medication in the past month, having recent facial or sinus surgery in the past 60 days, and double sickening (worsening symptoms after initial improvement).   Precipitating events  Maddy is not sure if she has been exposed to someone with strep throat. She has not recently been exposed to someone with influenza. Maddy has not been in close contact with any high risk individuals.   Pertinent COVID-19 (Coronavirus) information  Maddy does not work or volunteer as healthcare worker or a " . In the past 14 days, Maddy has not worked or volunteered at a healthcare facility or group living setting.   In the past 14 days, she also has not lived in a congregate living setting.   Maddy has not had a close contact with a laboratory-confirmed COVID-19 patient within 14 days of symptom onset.    Since December 2019, Maddy has been tested for COVID-19 and has had upper respiratory infection (URI) or influenza-like illness.      Result of COVID-19 test: Negative     Date of her COVID-19 test: 10/27/2020     Date(s) of previous URI or influenza-like illness (free-text): not sure     Symptoms Maddy experienced during previous URI or influenza-like illness as reported by the patient (free-text): not sure        Pertinent medical history  Maddy does not get yeast infections when she takes antibiotics.   Maddy does not need a return to work/school note.   Weight: 221 lbs   Maddy does not smoke or use smokeless tobacco.   She denies pregnancy and denies breastfeeding. She has menstruated in the past month.   Weight: 221 lbs    MEDICATIONS: Ventolin HFA inhalation, mometasone-formoterol inhalation, Collagen Plus Vitamin C oral, milk thistle oral, hydroxyzine HCl oral, Multiple Vitamin-Minerals oral, sertraline oral, Vraylar oral, ALLERGIES: mometasone furoate, EpiPen  Clinician Response:  Dear Maddy,   please go to one of our urgent care locations for evaluation and possible lab work.       Diagnosis: Contact with and (suspected) exposure to other viral communicable diseases  Diagnosis ICD: Z20.828

## 2020-11-06 NOTE — TELEPHONE ENCOUNTER
Called and spoke with pt. Appointment rescheduled. Pt requesting direction on Covid testing, instructed to go to [email or web address]. also requesting direction on strep testing. Instructed to discuss with PCP. No further questions.     Callie VELASQUEZ RN  Specialty/Allergy Clinics

## 2020-11-06 NOTE — TELEPHONE ENCOUNTER
Routing refill request to provider for review/approval because:  Drug not active on patient's medication list    Callie VELASQUEZ RN  Specialty/Allergy Clinics

## 2020-11-06 NOTE — TELEPHONE ENCOUNTER
Reason for call:  Patient reporting a symptom    Symptom or request: Sore throat, tired; patient has had sore throat 2 weeks, it's not better. She has had neg Strep and neg Covid test    Duration (how long have symptoms been present): 2 weeks    Have you been treated for this before? Yes    Phone Number patient can be reached at:  Home number on file [phone] (home)    Best Time:  Any    Can we leave a detailed message on this number:  YES    Call taken on 11/6/2020 at 1:26 PM by Simona Gibson

## 2020-11-06 NOTE — TELEPHONE ENCOUNTER
From what I can see, albuterol was prescribed 3 years ago by Dr. Blevins.  I have no issues refilling it, but I need to understand the reason.  From what I can see, Maddy has throat pain.  Albuterol is not designed to treat throat pain, it is for cough originating from the chest, or chest tightness, or wheezing, or shortness of breath.  Besides throat pain, does she have any other symptoms?    Rudy Shin MD

## 2020-11-06 NOTE — TELEPHONE ENCOUNTER
Her sore throat has maybe worsened. reviewed instructions from last visit. She thinks she saw white dots near tonsils. Noted she had a [facility] visit and provider recommended urgent care.  Juju Landry RN

## 2020-11-06 NOTE — ED AVS SNAPSHOT
[facility] Emergency Dept  [address] [address]  Phone: [phone]  Fax: [phone]                                    Maddy Ortiz   MRN: [id number]    Department: [facility] Emergency Dept   Date of Visit: 11/6/2020           After Visit Summary Signature Page    I have received my discharge instructions, and my questions have been answered. I have discussed any challenges I see with this plan with the nurse or doctor.    ..........................................................................................................................................  Patient/Patient Representative Signature      ..........................................................................................................................................  Patient Representative Print Name and Relationship to Patient    ..................................................               ................................................  Date                                   Time    ..........................................................................................................................................  Reviewed by Signature/Title    ...................................................              ..............................................  Date                                               Time          22EPIC Rev 08/18

## 2020-11-06 NOTE — TELEPHONE ENCOUNTER
Reason for call:  Patient reporting a symptom    Symptom or request: Pt has a sore throat still -was tested on 10-24-20 and in on 10-30-20 for FP for this concern and was treated with pain medication.  Was tested for strep and it was negative.  Will need to cancel today's injection at 2 because of her symptoms and needs RN to call her to reschedule.  States has missed a few before this because of illness and having to get tested for covid.    Duration (how long have symptoms been present):     Have you been treated for this before? No    Additional comments:     Phone Number patient can be reached at:  Home number on file [phone] (home)    Best Time:      Can we leave a detailed message on this number:  YES    Call taken on 11/6/2020 at 1:03 PM by Dena Dominguez

## 2020-11-06 NOTE — TELEPHONE ENCOUNTER
Requested Prescriptions   Pending Prescriptions Disp Refills     albuterol (PROAIR HFA/PROVENTIL HFA/VENTOLIN HFA) 108 (90 Base) MCG/ACT inhaler 1 Inhaler 0     Sig: Inhale 2 puffs into the lungs every 4 hours as needed for wheezing or shortness of breath / dyspnea       There is no refill protocol information for this order        Last office visit: 9/8/2020 with prescribing provider:  Dr. Shin   Future Office Visit:   Next 5 appointments (look out 90 days)    Nov 13, 2020  2:00 PM  Nurse Only with ALLERGY [facility] ([facility]) [address] [address]  [phone]               Denise Behrendt  Specialty CSS

## 2020-11-07 LAB
SARS-COV-2 RNA SPEC QL NAA+PROBE: NOT DETECTED
SPECIMEN SOURCE: NORMAL

## 2020-11-07 NOTE — RESULT ENCOUNTER NOTE
Group A Streptococcus PCR is NEGATIVE   No treatment or change in treatment [facility] ED lab result protocol - Strep protocol.

## 2020-11-17 ENCOUNTER — TELEPHONE (OUTPATIENT)
Dept: ALLERGY | Facility: CLINIC | Age: 35
End: 2020-11-17

## 2020-11-17 ENCOUNTER — TELEPHONE (OUTPATIENT)
Dept: FAMILY MEDICINE | Facility: CLINIC | Age: 35
End: 2020-11-17

## 2020-11-17 NOTE — TELEPHONE ENCOUNTER
Somewhat better.  Is not coughing any more but has some nasal congestion.  Try claritin D or allegra D.  Flonase.  Social distance, mask good hand washing.  Patient takes a muliti vit already.  Stay well hydrated and eat well balanced diet.  Immune system is down now recovering. No fever. Nicci PONCE RN

## 2020-11-17 NOTE — TELEPHONE ENCOUNTER
Start red 1: 1, 0.05 mL, and then increase by 0.05 mL, up to the maintenance dose.    Rudy Shin MD

## 2020-11-17 NOTE — TELEPHONE ENCOUNTER
Patient requesting to restart allergy immunotherapy. Please advise restart dose for immunotherapy as well as duration of time restart dose is valid.    Patient currently has Red vials available on site. If dose reduction requires new serum mixing for patient, please provide ample time for mixing when advising duration restart dose is valid.    Hx of reactions to immunotherapy: YES - Date: 09/08/20  Hx of asthma: UNKNOWN      Top Dose: RED 0.2  Last injection given on 09/22/20.       Vial Color Content  Dose   Red 1:1 Cat, Dog, Dust Mite  0.2     Red 1:1 Molds  0.2     Red 1:1 Trees  0.2     Red 1:1 Weeds  0.2           Signature  Kenney Pena LPN

## 2020-11-17 NOTE — TELEPHONE ENCOUNTER
Reason for call:  Patient reporting a symptom    Symptom or request: Patient had pneumonia, patient finished antibiotic medications is still tired and breathless and stuffy    Duration (how long have symptoms been present): Late October 2020    Have you been treated for this before? Yes    Phone Number patient can be reached at:  Home number on file [phone] (home)    Best Time:  Any    Can we leave a detailed message on this number:  YES    Call taken on 11/17/2020 at 10:42 AM by Simona Gibson

## 2020-11-19 ENCOUNTER — VIRTUAL VISIT (OUTPATIENT)
Dept: FAMILY MEDICINE | Facility: OTHER | Age: 35
End: 2020-11-19

## 2020-11-19 NOTE — PROGRESS NOTES
"Date: 2020 11:37:52  Clinician: Chapin Rayo  Clinician NPI: [id number]  Patient: Maddy Ortiz  Patient : 1985  Patient Address: [address] [place] [id number]  Patient Phone: [phone]  Visit Protocol: URI  Patient Summary:  Maddy is a 34 year old ( : 1985 ) female who initiated a [facility] Visit for cold, sinus infection, or influenza. When asked the question \"Please sign me up to receive news, health information and promotions. \", Maddy responded \"Yes\".    Maddy states her symptoms started gradually 1 month or more ago. After her symptoms started, they improved and then got worse again.   Her symptoms consist of myalgia, malaise, a sore throat, diarrhea, and ear pain. She is experiencing mild difficulty breathing with activities but can speak normally in full sentences.   Symptom details   Sore throat: Maddy reports having mild throat pain (1-3 on a 10 point pain scale), does not have exudate on her tonsils, and can swallow liquids. The lymph nodes in her neck are not enlarged. A rash has not appeared on the skin since the sore throat started.    Maddy denies having vomiting, rhinitis, facial pain or pressure, chills, teeth pain, ageusia, headache, wheezing, fever, enlarged lymph nodes, cough, nasal congestion, nausea, and anosmia. She also denies having recent facial or sinus surgery in the past 60 days.   Precipitating events  Within the past week, Maddy has not been exposed to someone with strep throat. She has not recently been exposed to someone with influenza. Maddy has not been in close contact with any high risk individuals.   Pertinent COVID-19 (Coronavirus) information  Maddy does not work or volunteer as healthcare worker or a . In the past 14 days, Maddy has not worked or volunteered at a healthcare facility or group living setting.   In the past 14 days, she also has not lived in a congregate living setting.   Maddy has not had a close contact " with a laboratory-confirmed COVID-19 patient within 14 days of symptom onset.    Since December 2019, Maddy has been tested for COVID-19 and has had upper respiratory infection (URI) or influenza-like illness.      Result of COVID-19 test: Negative     Date of her COVID-19 test: 11/06/2020     Date(s) of previous URI or influenza-like illness (free-text): 09/28/2020 to now     Symptoms Maddy experienced during previous URI or influenza-like illness as reported by the patient (free-text): pneumonia        Pertinent medical history  Maddy has taken an antibiotic medication in the past month. Antibiotic details as reported by the patient (free text): amoxicillin from 11/6/202-11/17/2020   Maddy does not get yeast infections when she takes antibiotics.   Maddy does not need a return to work/school note.   Weight: 221 lbs   Maddy does not smoke or use smokeless tobacco.   She denies pregnancy and denies breastfeeding. She has menstruated in the past month.   Weight: 221 lbs  A synchronous phone visit was initiated by the provider for the following reason: 1 month    MEDICATIONS: Ventolin HFA inhalation, mometasone-formoterol inhalation, Collagen Plus Vitamin C oral, milk thistle oral, hydroxyzine HCl oral, Multiple Vitamin-Minerals oral, sertraline oral, Vraylar oral, ALLERGIES: EpiPen, mometasone furoate, hydroxyzine  Clinician Response:  Dear Maddy,  Based on the information provided, you have acute bacterial sinusitis, also known as a sinus infection. Sinus infections are caused by bacteria or a virus and symptoms are almost always identical. The difference between the 2 types of infections is timing.  Sinus infections start as viral infections and symptoms improve on their own in about 7 days. If symptoms have not improved after 7 days or have even worsened, a bacterial infection may have developed.  Medication information  I am prescribing:     Amoxicillin-pot clavulanate 500-125 mg oral tablet. Take 1 tablet by  mouth every 8 hours for 10 days. There are no refills with this prescription.   Yeast infections can be a common side effect of antibiotics. The most common symptom of a yeast infection is itchiness in and around the vagina. Other signs and symptoms include burning, redness, or a thick, white vaginal discharge that looks like cottage cheese and does not have a bad smell.  If you become pregnant during this course of treatment, stop taking the medication and contact your primary care provider.  Unless you are allergic to the over-the-counter medication(s) below, I recommend using:       Ibuprofen (Advil or store brand) 200 mg oral tablet. Take 1-3 tablets (200-600 mg) by mouth every 8 hours to help with the discomfort. Make sure to take the ibuprofen with food. Do not exceed 2400 mg in 24 hours.      Dextromethorphan (Robitussin DM or store brand). This medication is a cough suppressant that works by decreasing the feeling of needing to cough. Please follow the instructions on the package.     Over-the-counter medications do not require a prescription. Ask the pharmacist if you have any questions.  Self care  Steps you can take to be as comfortable as possible:     Rest.    Drink plenty of fluids.    Use throat lozenges.    Suck on frozen items such as popsicles.    Drink hot tea with lemon and honey.    Gargle with warm salt water (1/4 teaspoon of salt per 8 ounce glass of water).     When to seek care  Please be seen in a clinic or urgent care if any of the following occur:     New symptoms develop, or symptoms become worse    Symptoms do not start to improve after 3 days of treatment     Call ahead before going to the clinic or urgent care.  It is possible to have an allergic reaction to an antibiotic even if you have not had one in the past. If you notice a new rash, significant swelling, or difficulty breathing, stop taking this medication immediately and go to a clinic or urgent care.  Call 911 or go to the  emergency room if you feel that your throat is closing off, you suddenly develop a rash, you are unable to swallow fluids, you are drooling, or you are having difficulty breathing.  COVID-19 (Coronavirus) General Information  Because there is currently no vaccine to prevent infection, the best way to protect yourself is to avoid being exposed to this virus. Common symptoms of COVID-19 include but are not limited to fever, cough, and shortness of breath. These symptoms appear 2-14 days after you are exposed to the virus that causes COVID-19. Click here for more information from the CDC on how to protect yourself.  If you are sick with COVID-19 or suspect you are infected with the virus that causes COVID-19, follow the steps here from the CDC to help prevent the disease from spreading to people in your home and community.  Click here for general information from the CDC on testing.  If you develop any of these emergency warning signs for COVID-19, get medical attention immediately:     Trouble breathing    Persistent pain or pressure in the chest    New confusion or inability to arouse    Bluish lips or face      Call your doctor or clinic before going in. Call 911 if you have a medical emergency and notify the  you have or think you may have COVID-19.  For more detailed and up to date information on COVID-19 (Coronavirus), please visit the CDC website.   Diagnosis: Acute bacterial sinusitis  Diagnosis ICD: J01.90  Triage Notes: I reviewed the patient's history, verified their identity, and explained the [facility] Visit process.    Diagnosed with pneumonia, will place patient on augmentin.  If not better in 10 days or worsening then be seen in a clinic.  Synchronous Triage: phone, status: completed, duration: 237 seconds  Prescription: amoxicillin-pot clavulanate 500-125 mg oral tablet 30 tablet, 10 days supply. Take 1 tablet by mouth every 8 hours for 10 days. Refills: 0, Refill as needed: no, Allow  substitutions: yes  Pharmacy: [id number] - [phone] - [address] [place] [id number]

## 2020-11-20 ENCOUNTER — NURSE TRIAGE (OUTPATIENT)
Dept: FAMILY MEDICINE | Facility: CLINIC | Age: 35
End: 2020-11-20

## 2020-11-20 DIAGNOSIS — B37.0 THRUSH: Primary | ICD-10-CM

## 2020-11-20 RX ORDER — NYSTATIN 100000/ML
500000 SUSPENSION, ORAL (FINAL DOSE FORM) ORAL 4 TIMES DAILY
Qty: 280 ML | Refills: 0 | Status: SHIPPED | OUTPATIENT
Start: 2020-11-20 | End: 2020-12-04

## 2020-11-20 NOTE — TELEPHONE ENCOUNTER
Reason for call:  Patient reporting a symptom    Symptom or request: green tongue, sore in spots but pt not sure if she has burnt her tongue.      Duration (how long have symptoms been present): x1 day    Have you been treated for this before? No    Additional comments: pt has been on a lot of antibiotics in the past month and ask if this is thrush. Pt request we send her a medication through the mail if possible    Phone Number patient can be reached at:  Home number on file [phone] (home)    Best Time:  any    Can we leave a detailed message on this number:  YES    Call taken on 11/20/2020 at 10:37 AM by Radha Abdalla

## 2020-11-20 NOTE — TELEPHONE ENCOUNTER
S-(situation): Patient thinks she has thrush.     B-(background): Emergency Department 11/6/20 for pneumonia, 11/19/20 virtual visit for pneumonia.     A-(assessment): patient thinks she might have thrush, has had a few rounds of antibiotics. Patient states started yesterday, thick lime greenish to white coating on tongue. Slightly sore inside the mouth.   Patient states she thinks its getting better but  Will be starting another round of antibiotics when she receives them in the mail and is worried thrush will get worse.   Patient does not drive and receives all medications through the mail.     R-(recommendations): no appointment available until Tuesday.   Provider please review and advise. Thank you.

## 2020-11-20 NOTE — TELEPHONE ENCOUNTER
Sent prescription for Nystatin suspension.  Follow-up with Shirley in person next week for recheck if not improving.  EMELY Still

## 2020-11-25 ENCOUNTER — VIRTUAL VISIT (OUTPATIENT)
Dept: FAMILY MEDICINE | Facility: OTHER | Age: 35
End: 2020-11-25

## 2020-11-25 NOTE — PROGRESS NOTES
"Date: 2020 00:08:45  Clinician: Sridevi Aguayo  Clinician NPI: [id number]  Patient: Maddy Ortiz  Patient : 1985  Patient Address: [address] [place] [address] [address], [place] [address]  Patient Phone: [phone]  Visit Protocol: URI  Patient Summary:  Maddy is a 34 year old ( : 1985 ) female who initiated a [facility] Visit for cold, sinus infection, or influenza. When asked the question \"Please sign me up to receive news, health information and promotions. \", Maddy responded \"Yes\".    Maddy states her symptoms started gradually 1 month or more ago. After her symptoms started, they improved and then got worse again.   Her symptoms consist of ear pain, enlarged lymph nodes, a cough, nasal congestion, rhinitis, myalgia, malaise, a sore throat, and diarrhea. She is experiencing mild difficulty breathing with activities but can speak normally in full sentences.   Symptom details     Nasal secretions: The color of her mucus is clear.    Cough: Maddy coughs a few times an hour and her cough is not more bothersome at night. Phlegm does not come into her throat when she coughs. She does not believe her cough is caused by post-nasal drip.     Sore throat: Maddy reports having moderate throat pain (4-6 on a 10 point pain scale), has exudate on her tonsils, and can swallow liquids. The lymph nodes in her neck are enlarged. A rash has not appeared on the skin since the sore throat started.      Maddy denies having headache, wheezing, fever, nausea, vomiting, facial pain or pressure, chills, teeth pain, ageusia, and anosmia. She also denies having recent facial or sinus surgery in the past 60 days.   Precipitating events  Within the past week, Maddy has not been exposed to someone with strep throat. She has not recently been exposed to someone with influenza. Maddy has been in close contact with the following high risk individuals: adults 65 or older.   Pertinent COVID-19 (Coronavirus) information  Maddy does not " work or volunteer as healthcare worker or a . In the past 14 days, Maddy has not worked or volunteered at a healthcare facility or group living setting.   In the past 14 days, she also has not lived in a congregate living setting.   Maddy has not had a close contact with a laboratory-confirmed COVID-19 patient within 14 days of symptom onset.    Since December 2019, Maddy has been tested for COVID-19 and has had upper respiratory infection (URI) or influenza-like illness.      Result of COVID-19 test: Negative     Date of her COVID-19 test: 11/27/2020     Date(s) of previous URI or influenza-like illness (free-text): for the last two months     Symptoms Maddy experienced during previous URI or influenza-like illness as reported by the patient (free-text): I have pneumonia and I wonder if I have an ear infection and whether the amoxicillin clavul will treat        Pertinent medical history  Maddy has asthma. She uses quick-relief inhaler more than two times per week. She refills her quick-relief inhaler more than two times per year. She wakes up at night with asthma symptoms less than two times per month.   Maddy has taken an antibiotic medication in the past month. Antibiotic details as reported by the patient (free text): amoxicillion starting Nov 6, amoxicillin-pot clavul 500-1 starting Nov 24   She has not been told by her provider to avoid NSAIDs.   Maddy does not get yeast infections when she takes antibiotics.   Maddy does not have diabetes. She denies having immunosuppressive conditions (e.g., chemotherapy, HIV, organ transplant, long-term use of steroids or other immunosuppressive medications, splenectomy). She does not have severe COPD and congestive heart failure.   Maddy does not need a return to work/school note.   Weight: 225 lbs   Maddy does not smoke or use smokeless tobacco.   She denies pregnancy and denies breastfeeding. She is currently menstruating.   Weight: 225 lbs     MEDICATIONS: Ventolin HFA inhalation, mometasone-formoterol inhalation, Collagen Plus Vitamin C oral, milk thistle oral, hydroxyzine HCl oral, Multiple Vitamin-Minerals oral, sertraline oral, Vraylar oral, ALLERGIES: hydroxyzine, EpiPen, mometasone furoate  Clinician Response:  Dear Maddy,  Based on the information provided, you have acute bacterial sinusitis, also known as a sinus infection. Sinus infections are caused by bacteria or a virus and symptoms are almost always identical. The difference between the 2 types of infections is timing.  Sinus infections start as viral infections and symptoms improve on their own in about 7 days. If symptoms have not improved after 7 days or have even worsened, a bacterial infection may have developed.  Medication information  I am prescribing:     Amoxicillin-pot clavulanate 875-125 mg oral tablet. Take 1 tablet by mouth every 12 hours for 7 days. There are no refills with this prescription.   Yeast infections can be a common side effect of antibiotics. The most common symptom of a yeast infection is itchiness in and around the vagina. Other signs and symptoms include burning, redness, or a thick, white vaginal discharge that looks like cottage cheese and does not have a bad smell.  If you become pregnant during this course of treatment, stop taking the medication and contact your primary care provider.  Unless you are allergic to the over-the-counter medication(s) below, I recommend using:   A decongestant such as Sudafed PE or store brand.   Over-the-counter medications do not require a prescription. Ask the pharmacist if you have any questions.  Self care  Steps you can take to be as comfortable as possible:     Rest.    Drink plenty of fluids.    Take a warm shower to loosen congestion    Use a cool-mist humidifier.    Use throat lozenges.    Suck on frozen items such as popsicles.    Drink hot tea with lemon and honey.    Gargle with warm salt water (1/4 teaspoon of salt  per 8 ounce glass of water).    Take a spoonful of honey to reduce your cough.     When to seek care  Please be seen in a clinic or urgent care if any of the following occur:     New symptoms develop, or symptoms become worse    Symptoms do not start to improve after 3 days of treatment     Call ahead before going to the clinic or urgent care.  It is possible to have an allergic reaction to an antibiotic even if you have not had one in the past. If you notice a new rash, significant swelling, or difficulty breathing, stop taking this medication immediately and go to a clinic or urgent care.  Call 911 or go to the emergency room if you feel that your throat is closing off, you suddenly develop a rash, you are unable to swallow fluids, you are drooling, or you are having difficulty breathing.  COVID-19 (Coronavirus) General Information  Because there is currently no vaccine to prevent infection, the best way to protect yourself is to avoid being exposed to this virus. Common symptoms of COVID-19 include but are not limited to fever, cough, and shortness of breath. These symptoms appear 2-14 days after you are exposed to the virus that causes COVID-19. Click here for more information from the CDC on how to protect yourself.  If you are sick with COVID-19 or suspect you are infected with the virus that causes COVID-19, follow the steps here from the CDC to help prevent the disease from spreading to people in your home and community.  Click here for general information from the CDC on testing.  If you develop any of these emergency warning signs for COVID-19, get medical attention immediately:     Trouble breathing    Persistent pain or pressure in the chest    New confusion or inability to arouse    Bluish lips or face      Call your doctor or clinic before going in. Call 911 if you have a medical emergency and notify the  you have or think you may have COVID-19.  For more detailed and up to date information on  COVID-19 (Coronavirus), please visit the CDC website.   Diagnosis: Acute bacterial sinusitis  Diagnosis ICD: J01.90  Prescription: amoxicillin-pot clavulanate 875-125 mg oral tablet 14 tablet, 7 days supply. Take 1 tablet by mouth every 12 hours for 7 days. Refills: 0, Refill as needed: no, Allow substitutions: yes  Pharmacy: [id number] - [phone] - [address] [place], [place] [id number]

## 2020-12-08 ENCOUNTER — HOSPITAL ENCOUNTER (OUTPATIENT)
Dept: PHYSICAL THERAPY | Facility: CLINIC | Age: 35
Setting detail: THERAPIES SERIES
End: 2020-12-08
Attending: NURSE PRACTITIONER
Payer: MEDICARE

## 2020-12-08 PROCEDURE — 97110 THERAPEUTIC EXERCISES: CPT | Mod: GP | Performed by: PHYSICAL MEDICINE & REHABILITATION

## 2020-12-08 PROCEDURE — 97161 PT EVAL LOW COMPLEX 20 MIN: CPT | Mod: GP | Performed by: PHYSICAL MEDICINE & REHABILITATION

## 2020-12-08 PROCEDURE — 97140 MANUAL THERAPY 1/> REGIONS: CPT | Mod: GP | Performed by: PHYSICAL MEDICINE & REHABILITATION

## 2020-12-08 NOTE — PROGRESS NOTES
"   12/08/20 1000   General Information   Type of Visit Initial OP Ortho PT Evaluation   Start of Care Date 12/08/20   Referring Physician Shirley Freeman APRN CNP   Patient/Family Goals Statement \"get stretches and exercises for home to better align my back and improve my overall health\"   Orders Evaluate and Treat   Date of Order 08/13/20   Certification Required? Yes  ([facility])   Medical Diagnosis Chronic bilateral low back pain without sciatica    Surgical/Medical history reviewed Yes   Precautions/Limitations no known precautions/limitations   General Information Comments PMHx per pt report: depression   Body Part(s)   Body Part(s) Lumbar Spine/SI   Presentation and Etiology   Pertinent history of current problem (include personal factors and/or comorbidities that impact the POC) Pt arrived to PT today for chronic LBP that started in 2008 when pregnant and during delivery. Notes pain persisted while breastfeeding and has continued since. Has seen chiropractor who states back is \"twisted.\" No longer seeing the chiropractor now but felt better following. Trying to lose weight currently but having a hard time with back pain. Pain worse on the right side.   Impairments A. Pain;B. Decreased WB tolerance;D. Decreased ROM;E. Decreased flexibility;H. Impaired gait;M. Locking or catching   Functional Limitations perform activities of daily living;perform required work activities;perform desired leisure / sports activities   Symptom Location LBP (R>L)   How/Where did it occur Other  (pregnancy)   Onset date of current episode/exacerbation 08/03/08   Chronicity Chronic   Pain rating (0-10 point scale) Best (/10);Worst (/10)   Best (/10) 0   Worst (/10) 7   Pain quality B. Dull;C. Aching   Frequency of pain/symptoms A. Constant   Pain/symptoms are: The same all the time   Pain/symptoms exacerbated by B. Walking   Pain/symptoms eased by C. Rest   Progression of symptoms since onset: Improved  (improved since chiropractor) "   Prior Level of Function   Prior Level of Function-Mobility independent   Prior Level of Function-ADLs independent   Current Level of Function   Current Community Support Family/friend caregiver   Patient role/employment history C. Homemaker   Living environment House/townhome   Current equipment-Gait/Locomotion None   Fall Risk Screen   Fall screen completed by PT   Have you fallen 2 or more times in the past year? No   Have you fallen and had an injury in the past year? No   Is patient a fall risk? No   Abuse Screen (yes response referral indicated)   Feels Unsafe at Home or Work/School no   Feels Threatened by Someone no   Does Anyone Try to Keep You From Having Contact with Others or Doing Things Outside Your Home? no   Physical Signs of Abuse Present no   System Outcome Measures   Outcome Measures Low Back Pain (see Oswestry and Jailyn)   Lumbar Spine/SI Objective Findings   Integumentary no palpable swelling   Posture rounded shoulders B   Gait/Locomotion slight toe in B   Flexion ROM fingertips to floor   Extension ROM no limitation   Right Side Bending ROM superior patella   Left Side Bending ROM superior patella   Repeated Extension-Standing ROM neg   Repeated Flexion-Standing ROM neg   Lumbar ROM Comment Rotation: wnl B. no pain with AROM   Pelvic Screen L posterior innominate, R anterior innominate. Neg SIJ compression and distraction   Hip Screen neg Scour and ARGENIS   Hip Flexion (L2) Strength R: 5-/5, L:4+/5   Hip Abduction Strength seated: 5/5 B   Hip Adduction Strength seated: 5/5 B   Hip Extension Strength able to perform supine bridge   Knee Flexion Strength R: 4+/5, L:5-/5   Knee Extension (L3) Strength R: 5-/5, L:5/5   Ankle Dorsiflexion (L4) Strength 5/5 B   Great Toe Extension (L5) Strength 5/5 B   Ankle Plantar Flexion (S1) Strength 5/5 B   Lumbar/Hip/Knee/Foot Strength Comments Hip IR: 5-/5 B, Hip ER: 5-/5 B   Hamstring Flexibility wnl B   Hip Flexor Flexibility wnl B   Quadricep Flexibility  "wnl B   Piriformis Flexibility wnl B   SLR neg B   Harshad Test wnl B   Prone Instability Test neg   Crossover SLR neg B   Repeated Extension Prone neg   Slump Test neg   Spring Test no pain   Segmental Mobility normal   Sensation Testing noted decreased sensation of L3 B (states, \"it just feels different, feels like you switched your arms)   Palpation Pt noted slight increase in sx with mod-max palpation of L2-4 paraspinals   Planned Therapy Interventions   Planned Therapy Interventions ADL retraining;joint mobilization;manual therapy;motor coordination training;neuromuscular re-education;ROM;strengthening;stretching;transfer training   Planned Modality Interventions   Planned Modality Interventions Cryotherapy;Electrical stimulation;Hot packs;TENS;Traction;Ultrasound   Planned Modality Interventions Comments only as needed   Clinical Impression   Criteria for Skilled Therapeutic Interventions Met yes, treatment indicated   PT Diagnosis chronic LBP   Influenced by the following impairments decreased ROM, decreased strength, pain   Functional limitations due to impairments walking, sitting, standing   Clinical Presentation Stable/Uncomplicated   Clinical Presentation Rationale ROM, strength, BRUCE, sofya, clinical judgement   Clinical Decision Making (Complexity) Low complexity   Therapy Frequency 1 time/week   Predicted Duration of Therapy Intervention (days/wks) 6 weeks   Risk & Benefits of therapy have been explained Yes   Patient, Family & other staff in agreement with plan of care Yes   Clinical Impression Comments Pt is a 35 y.o. female who presented to the PT clinic today with a rehab diagnosis of chronic LBP as evidenced by decreased ROM, decreased strength, and pain. Pt is appropriate for skilled PT to address previously listed impairments in order to decrease difficulty with sitting, standing and walking.    Education Assessment   Preferred Learning Style Listening;Reading;Demonstration;Pictures/video "   Barriers to Learning No barriers   ORTHO GOALS   PT Ortho Eval Goals 1;2;3;4   Ortho Goal 1   Goal Identifier 1   Goal Description Pt will be able to sit >1 hour without increase in sx in order to decrease difficulty with ADLs.    Target Date 12/29/20   Ortho Goal 2   Goal Identifier 2   Goal Description Pt will be able to stand/walk >1 hr without increase in sx in order to decrease difficulty with ADLs.    Target Date 12/29/20   Ortho Goal 3   Goal Identifier 3   Goal Description Pt will be able to demonstrate 5/5 B LE strength in order to decrease difficulty with walking   Target Date 01/22/21   Ortho Goal 4   Goal Identifier 4   Goal Description Pt will be independent with HEP in order to self manage symptomsm.    Target Date 01/22/21   Total Evaluation Time   PT Eval, Low Complexity Minutes (05533) 25   Therapy Certification   Certification date from 12/08/20   Certification date to 01/22/21   Medical Diagnosis Chronic bilateral low back pain without sciatica        Please contact me with any questions or concerns.    Thank you for your referral,     Akiko Welsh, PT, DPT  Physical Therapist  [address] [address] [phone]

## 2020-12-08 NOTE — PROGRESS NOTES
[facility]          OUTPATIENT PHYSICAL THERAPY ORTHOPEDIC EVALUATION  PLAN OF TREATMENT FOR OUTPATIENT REHABILITATION  (COMPLETE FOR INITIAL CLAIMS ONLY)  Patient's Last Name, First Name, M.I.  YOB: 1985  Maddy Ortiz  MARYSOL    Provider s Name:  [facility]   Medical Record No.  [id number]   Start of Care Date:  12/08/20   Onset Date:  08/03/08   Type:     _X__PT   ___OT   ___SLP Medical Diagnosis:  Chronic bilateral low back pain without sciatica      PT Diagnosis:  chronic LBP   Visits from SOC:  1      _________________________________________________________________________________  Plan of Treatment/Functional Goals:  ADL retraining, joint mobilization, manual therapy, motor coordination training, neuromuscular re-education, ROM, strengthening, stretching, transfer training     Cryotherapy, Electrical stimulation, Hot packs, TENS, Traction, Ultrasound  only as needed  Goals  Goal Identifier: 1  Goal Description: Pt will be able to sit >1 hour without increase in sx in order to decrease difficulty with ADLs.   Target Date: 12/29/20    Goal Identifier: 2  Goal Description: Pt will be able to stand/walk >1 hr without increase in sx in order to decrease difficulty with ADLs.   Target Date: 12/29/20    Goal Identifier: 3  Goal Description: Pt will be able to demonstrate 5/5 B LE strength in order to decrease difficulty with walking  Target Date: 01/22/21    Goal Identifier: 4  Goal Description: Pt will be independent with HEP in order to self manage symptomsm.   Target Date: 01/22/21    Therapy Frequency:  1 time/week  Predicted Duration of Therapy Intervention:  6 weeks    Akiko Welsh PT                 I CERTIFY THE NEED FOR THESE SERVICES FURNISHED UNDER        THIS PLAN OF TREATMENT AND WHILE UNDER MY CARE     (Physician co-signature of this document indicates review and certification of the therapy plan).                       Certification Date From:   12/08/20   Certification Date To:  01/22/21    Referring Provider:  Shirley Freeman APRN CNP    Initial Assessment        See Epic Evaluation Start of Care Date: 12/08/20

## 2020-12-10 ENCOUNTER — TELEPHONE (OUTPATIENT)
Dept: ALLERGY | Facility: CLINIC | Age: 35
End: 2020-12-10

## 2020-12-10 NOTE — TELEPHONE ENCOUNTER
Called pt to get consent to mix down Red vials to Yellow. Called 2 numbers and both cannot except calls at this time.    Kenney Pena / JACKELYN

## 2020-12-16 ENCOUNTER — TRANSFERRED RECORDS (OUTPATIENT)
Dept: HEALTH INFORMATION MANAGEMENT | Facility: CLINIC | Age: 35
End: 2020-12-16

## 2020-12-18 NOTE — TELEPHONE ENCOUNTER
Called pt and connection does not receive calls at this time. Called daughter number and line was busy.  Kenney Pena / JACKELYN

## 2020-12-21 ENCOUNTER — VIRTUAL VISIT (OUTPATIENT)
Dept: FAMILY MEDICINE | Facility: OTHER | Age: 35
End: 2020-12-21

## 2020-12-21 NOTE — PROGRESS NOTES
"Date: 2020 07:11:13  Clinician: Sharon Edwards  Clinician NPI: [id number]  Patient: Maddy Ortiz  Patient : 1985  Patient Address: [address] [place] [address] [address], [place] [address]  Patient Phone: [phone]  Visit Protocol: URI  Patient Summary:  Maddy is a 35 year old ( : 1985 ) female who initiated a [facility] Visit for COVID-19 (Coronavirus) evaluation and screening. When asked the question \"Please sign me up to receive news, health information and promotions. \", Maddy responded \"Yes\".    Maddy states her symptoms started today.   Her symptoms consist of diarrhea, myalgia, ear pain, a headache, enlarged lymph nodes, a cough, nasal congestion, nausea, malaise, a sore throat, tooth pain, and rhinitis.   Symptom details     Nasal secretions: The color of her mucus is clear, blood-tinged, and white.    Cough: Maddy coughs a few times an hour and her cough is not more bothersome at night. Phlegm does not come into her throat when she coughs. She believes her cough is caused by post-nasal drip.     Sore throat: Maddy reports having moderate throat pain (4-6 on a 10 point pain scale), does not have exudate on her tonsils, and can swallow liquids. The lymph nodes in her neck are enlarged. A rash has not appeared on the skin since the sore throat started.     Headache: She states the headache is mild (1-3 on a 10 point pain scale).     Tooth pain: The tooth pain is not caused by a cavity, recent dental work, or other mouth problems.      Maddy denies having facial pain or pressure, anosmia, wheezing, fever, chills, ageusia, and vomiting. She also denies having recent facial or sinus surgery in the past 60 days. She is not experiencing dyspnea.   Precipitating events  Within the past week, Maddy has not been exposed to someone with strep throat. She has not recently been exposed to someone with influenza. Maddy has not been in close contact with any high risk individuals.   Pertinent COVID-19 " (Coronavirus) information  Maddy does not work or volunteer as healthcare worker or a . In the past 14 days, Maddy has not worked or volunteered at a healthcare facility or group living setting.   In the past 14 days, she also has not lived in a congregate living setting.   Maddy has not had a close contact with a laboratory-confirmed COVID-19 patient within 14 days of symptom onset.    Maddy has been tested for COVID-19.      Date(s) of her COVID-19 test as reported by the patient (free text): Oct 13, 2020; Oct 27th, 2020; Oct 29th, 2020       Result of COVID-19 test as reported by the patient (free text): all: negative       Type of test as reported by the patient (free text): nasal        Pertinent medical history  Maddy has asthma. She uses quick-relief inhaler less than two times per week. She refills her quick-relief inhaler less than two times per year. She wakes up at night with asthma symptoms less than two times per month.   Maddy has taken an antibiotic medication in the past month. Antibiotic details as reported by the patient (free text): amoxicillin, 3 different prescriptions, 325mg (14 tablets), 500mg (30 tablets), 825mg (14 tablets)   She has not been told by her provider to avoid NSAIDs.   Maddy does not get yeast infections when she takes antibiotics.   Maddy does not have diabetes. She denies having immunosuppressive conditions (e.g., chemotherapy, HIV, organ transplant, long-term use of steroids or other immunosuppressive medications, splenectomy). She denies having congestive heart failure and severe COPD.   Maddy needs a return to work/school note.   Maddy does not smoke or use smokeless tobacco.   She denies pregnancy and denies breastfeeding. She has menstruated in the past month.   Additional information as reported by the patient (free text): I am wondering if I have covid because I have diarhea, but I also cracked my back and I feel like I have a numb leg, so is the diarrhea  from that?  I feel afraid to be around my worker because I don't want to get anybody sick   Weight: 220 lbs    MEDICATIONS: Ventolin HFA inhalation, mometasone-formoterol inhalation, Collagen Plus Vitamin C oral, milk thistle oral, hydroxyzine HCl oral, Multiple Vitamin-Minerals oral, sertraline oral, Vraylar oral, ALLERGIES: hydroxyzine, EpiPen, mometasone furoate  Clinician Response:  Dear Maddy,   Your symptoms show that you may have coronavirus (COVID-19). This illness can cause fever, cough and trouble breathing. Many people get a mild case and get better on their own. Some people can get very sick.  What should I do?  We would like to test you for this virus.   1. Please call [phone] to schedule your visit. Explain that you were referred by [facility] to have a COVID-19 test. Be ready to share your [facility] visit ID number.  * If you need to schedule in [facility] please call [phone] or for [place] employees please call [phone].  * If you need to schedule in the [place] area please call [phone]. [place] employees call [phone].  The following will serve as your written order for this COVID Test, ordered by me, for the indication of suspected COVID [Z20.828]: The test will be ordered in [employer], our electronic health record, after you are scheduled. It will show as ordered and authorized by Hector Andrade MD.  Order: COVID-19 (Coronavirus) PCR for SYMPTOMATIC testing from [facility].   2. When it's time for your COVID test:  Stay at least 6 feet away from others. (If someone will drive you to your test, stay in the backseat, as far away from the  as you can.)   Cover your mouth and nose with a mask, tissue or washcloth.  Go straight to the testing site. Don't make any stops on the way there or back.      3.Starting now: Stay home and away from others (self-isolate) until:   You've had no fever---and no medicine that reduces fever---for one full day (24 hours). And...   Your other symptoms have  "gotten better. For example, your cough or breathing has improved. And...   At least 10 days have passed since your symptoms started.       During this time, don't leave the house except for testing or medical care.   Stay in your own room, even for meals. Use your own bathroom if you can.   Stay away from others in your home. No hugging, kissing or shaking hands. No visitors.  Don't go to work, school or anywhere else.    Clean \"high touch\" surfaces often (doorknobs, counters, handles, etc.). Use a household cleaning spray or wipes. You'll find a full list of  on the EPA website: www.epa.gov/pesticide-registration/list-n-disinfectants-use-against-sars-cov-2.   Cover your mouth and nose with a mask, tissue or washcloth to avoid spreading germs.  Wash your hands and face often. Use soap and water.  Caregivers in these groups are at risk for severe illness due to COVID-19:  o People 65 years and older  o People who live in a nursing home or long-term care facility  o People with chronic disease (lung, heart, cancer, diabetes, kidney, liver, immunologic)  o People who have a weakened immune system, including those who:   Are in cancer treatment  Take medicine that weakens the immune system, such as corticosteroids  Had a bone marrow or organ transplant  Have an immune deficiency  Have poorly controlled HIV or AIDS  Are obese (body mass index of 40 or higher)  Smoke regularly   o Caregivers should wear gloves while washing dishes, handling laundry and cleaning bedrooms and bathrooms.  o Use caution when washing and drying laundry: Don't shake dirty laundry, and use the warmest water setting that you can.  o For more tips, go to www.cdc.gov/coronavirus/2019-ncov/downloads/10Things.pdf.    4.Sign up for Clarissa Sharma. We know it's scary to hear that you might have COVID-19. We want to track your symptoms to make sure you're okay over the next 2 weeks. Please look for an email from Clarissa Sharma---this is a free, " online program that we'll use to keep in touch. To sign up, follow the link in the email. Learn more at [email or web address]  How can I take care of myself?   Get lots of rest. Drink extra fluids (unless a doctor has told you not to).   Take Tylenol (acetaminophen) for fever or pain. If you have liver or kidney problems, ask your family doctor if it's okay to take Tylenol.   Adults can take either:    650 mg (two 325 mg pills) every 4 to 6 hours, or...   1,000 mg (two 500 mg pills) every 8 hours as needed.    Note: Don't take more than 3,000 mg in one day. Acetaminophen is found in many medicines (both prescribed and over-the-counter medicines). Read all labels to be sure you don't take too much.   For children, check the Tylenol bottle for the right dose. The dose is based on the child's age or weight.    If you have other health problems (like cancer, heart failure, an organ transplant or severe kidney disease): Call your specialty clinic if you don't feel better in the next 2 days.       Know when to call 911. Emergency warning signs include:    Trouble breathing or shortness of breath Pain or pressure in the chest that doesn't go away Feeling confused like you haven't felt before, or not being able to wake up Bluish-colored lips or face.  Where can I get more information?   [facility] -- About COVID-19: [email or web address]   CDC -- What to Do If You're Sick: www.cdc.gov/coronavirus/2019-ncov/about/steps-when-sick.html   CDC -- Ending Home Isolation: www.cdc.gov/coronavirus/2019-ncov/hcp/disposition-in-home-patients.html   CDC -- Caring for Someone: www.cdc.gov/coronavirus/2019-ncov/if-you-are-sick/care-for-someone.html   [facility] -- Interim Guidance for Hospital Discharge to Home: www.health.[facility].[place]/diseases/coronavirus/hcp/hospdischarge.pdf   [facility] clinical trials (COVID-19 research studies): clinicalaffairs.[facility]-clinical-trials    Below are the COVID-19  hotlines at the [place] [facility] ([facility]). Interpreters are available.    For health questions: Call 940-578-2242 or 1-176.628.1166 (7 a.m. to 7 p.m.) For questions about schools and childcare: Call 358-863-6258 or 1-501.358.9667 (7 a.m. to 7 p.m.)    Diagnosis: Cough  Diagnosis ICD: R05

## 2020-12-30 NOTE — TELEPHONE ENCOUNTER
Called pt [facility].    Closed encounter.     Pt needs a Mix down and a new restart dose. Staff needs to get consent from pt.    Kenney Pena / JACKELYN

## 2021-01-11 NOTE — PROGRESS NOTES
Outpatient Physical Therapy Discharge Note     Patient: Maddy Ortiz  : 1985    Beginning/End Dates of Reporting Period:  20 to 20     Referring Provider: Shirley Freeman APRN CNP    Therapy Diagnosis: chronic LBP    Patient did not return for follow up treatments as directed.  Goal status and current objective information is therefore unknown.  Discharge from PT services at this time for this episode of treatment. Please see attached documentation under this episode of care for further information including dates of service, start of care date, referring physician, Dx, treatment plan, treatments, etc.    Please contact me with any questions or concerns.    Thank you for your referral.    Akiko Welsh, PT, DPT  Physical Therapist  [address] [address] [phone]

## 2021-01-12 ENCOUNTER — TRANSFERRED RECORDS (OUTPATIENT)
Dept: HEALTH INFORMATION MANAGEMENT | Facility: CLINIC | Age: 36
End: 2021-01-12

## 2021-01-15 ENCOUNTER — HEALTH MAINTENANCE LETTER (OUTPATIENT)
Age: 36
End: 2021-01-15

## 2021-02-25 ENCOUNTER — TRANSFERRED RECORDS (OUTPATIENT)
Dept: HEALTH INFORMATION MANAGEMENT | Facility: CLINIC | Age: 36
End: 2021-02-25

## 2021-03-08 ENCOUNTER — TELEPHONE (OUTPATIENT)
Dept: ALLERGY | Facility: CLINIC | Age: 36
End: 2021-03-08

## 2021-03-08 DIAGNOSIS — J30.1 CHRONIC SEASONAL ALLERGIC RHINITIS DUE TO POLLEN: Primary | ICD-10-CM

## 2021-03-08 NOTE — TELEPHONE ENCOUNTER
[facility]    mometasone (NASONEX) 50 MCG/ACT nasal spray    ALTERNATIVE REQUESTE:  Non formulary; will pay for fluticasone    Denise Behrendt  Specialty CSS

## 2021-03-09 RX ORDER — FLUTICASONE PROPIONATE 50 MCG
1-2 SPRAY, SUSPENSION (ML) NASAL DAILY
Qty: 16 G | Refills: 3 | Status: SHIPPED | OUTPATIENT
Start: 2021-03-09 | End: 2021-07-13

## 2021-03-11 ENCOUNTER — TRANSFERRED RECORDS (OUTPATIENT)
Dept: HEALTH INFORMATION MANAGEMENT | Facility: CLINIC | Age: 36
End: 2021-03-11

## 2021-03-17 ENCOUNTER — TELEPHONE (OUTPATIENT)
Dept: FAMILY MEDICINE | Facility: CLINIC | Age: 36
End: 2021-03-17

## 2021-03-17 NOTE — TELEPHONE ENCOUNTER
Should pt have an office visit before resuming injections? She is due for an office visit 5-29-21.    Please advise.  Kenney Pena / JACKELNY

## 2021-03-17 NOTE — TELEPHONE ENCOUNTER
Spoke with pt and informed her that she needs an office visit. Pt understood.  Kenney Pena / JACKELYN

## 2021-03-17 NOTE — TELEPHONE ENCOUNTER
Reason for Call:  Other appointment    Detailed comments: please call to schedule allergy inj    Phone Number Patient can be reached at: Other phone number:  [phone]    Best Time: any    Can we leave a detailed message on this number? YES    Call taken on 3/17/2021 at 1:32 PM by Lisset Lindsay

## 2021-03-19 ENCOUNTER — OFFICE VISIT (OUTPATIENT)
Dept: ALLERGY | Facility: CLINIC | Age: 36
End: 2021-03-19
Payer: MEDICARE

## 2021-03-19 VITALS
HEART RATE: 109 BPM | TEMPERATURE: 98.5 F | OXYGEN SATURATION: 96 % | BODY MASS INDEX: 42.33 KG/M2 | WEIGHT: 238.98 LBS | DIASTOLIC BLOOD PRESSURE: 90 MMHG | SYSTOLIC BLOOD PRESSURE: 138 MMHG

## 2021-03-19 DIAGNOSIS — J30.89 ALLERGIC RHINITIS DUE TO MOLD: ICD-10-CM

## 2021-03-19 DIAGNOSIS — J30.1 CHRONIC SEASONAL ALLERGIC RHINITIS DUE TO POLLEN: Primary | ICD-10-CM

## 2021-03-19 DIAGNOSIS — J30.81 ALLERGIC RHINITIS DUE TO ANIMAL DANDER: ICD-10-CM

## 2021-03-19 DIAGNOSIS — J30.89 ALLERGIC RHINITIS DUE TO DUST MITE: ICD-10-CM

## 2021-03-19 DIAGNOSIS — R06.02 SHORTNESS OF BREATH: ICD-10-CM

## 2021-03-19 PROCEDURE — 99214 OFFICE O/P EST MOD 30 MIN: CPT | Performed by: ALLERGY & IMMUNOLOGY

## 2021-03-19 RX ORDER — AZELASTINE 1 MG/ML
2 SPRAY, METERED NASAL DAILY PRN
Qty: 30 ML | Refills: 3 | Status: SHIPPED | OUTPATIENT
Start: 2021-03-19 | End: 2021-05-19

## 2021-03-19 RX ORDER — ALBUTEROL SULFATE 90 UG/1
2 AEROSOL, METERED RESPIRATORY (INHALATION) EVERY 4 HOURS PRN
Qty: 18 G | Refills: 3 | Status: ON HOLD | OUTPATIENT
Start: 2021-03-19 | End: 2022-03-29

## 2021-03-19 NOTE — PATIENT INSTRUCTIONS
Today we discussed 2 options:  Option #1.  Restart allergy shots within the next month and continue them for 3 years.  You do have some concerns in regards to COVID-19 infection and coming to our office.  Option #2.  You received allergy injections for about 2 years.  Your symptoms improved.  It is hard to say how long you will have a sustained effect.  While your symptoms can come back within a matter of several months, you may do pretty well for several years too.    We decided that option #2 fits you better.  We will see each other in 6 months.  If at that time you continue doing well, nothing new needs to be done.  If your symptoms get worse, then we can restart allergy shots again.      Meanwhile, you can continue using azelastine nasal spray.  As a matter of fact you can use it 2 sprays in each nostril twice a day if it is needed.  However, if you continue having nasal congestion even with 2 sprays twice a day, I would recommend to add Flonase 1-2 sprays in each nostril once a day.  You can use Flonase for about 6 weeks, and then try stopping it.  See if your symptoms get worse or you can manage without it after that.     I also recommend you to get a pulmonary function test to understand more about your shortness of breath.    You can definitely try using albuterol inhaler before exercising and see if that helps.     Call to schedule pulmonary function test:    [phone]

## 2021-03-19 NOTE — PROGRESS NOTES
SUBJECTIVE:                                                                   Maddy Ortiz presents today to our Allergy Clinic at [facility] for a follow up visit. She is a 35 year old female with allergic rhinitis.      Percutaneous skin puncture testing for aeroallergens performed in November 2016 showed sensitivity to dog, cat, dust mite, molds, pollen of trees, grass, and weeds. In spring-summer 2018, she had a buildup using cluster schedule for allergen immunotherapy.  She reached maintenance dose in July 2018.  One episode of anaphylaxis on September 8, 2020, due to allergen immunotherapy, Red 1:1, 0.5mL  Was given epi x 2.  She tolerated a lowed dose x 1 after that.     These days, her environmental/seasonal allergy symptoms were not as bad as before she started allergen immunotherapy.  Besides mild nasal congestion, she denies sneezing, postnasal drainage, or itchy/watery eyes.  The only thing is that nasal congestion makes her sound a bit nasally.  She uses azelastine at night. Does not need to use intranasal steroids.    She had pneumonia in November 2020. Was using albuterol at that time. These days, she develops dyspnea each time she performs a strenuous physical activity. She does not use albuterol for that. She has not tried using it preexertionally either. Denies any nighttime chest symptoms.        Patient Active Problem List   Diagnosis     Animal dander allergy     CARDIOVASCULAR SCREENING; LDL GOAL LESS THAN 160     Psychosis (H)     Paranoid type delusional disorder (H)     Bipolar 1 disorder (H)     Insomnia     Depression with anxiety     Seasonal allergic rhinitis due to pollen     Allergic rhinitis due to mold     Allergic rhinitis due to animal dander     Allergic rhinitis due to dust mite     Encounter for IUD insertion     Schizoaffective disorder, bipolar type (H)     Gastroesophageal reflux disease without esophagitis     Paranoia (psychosis) (H)     Obesity  (BMI 35.0-39.9) with comorbidity (H)       Past Medical History:   Diagnosis Date     Bipolar 1 disorder (H) 12/6/2012     Depressive disorder      Paranoid type delusional disorder (H) 11/14/2012      *Patient is Adopted       Problem (# of Occurrences) Relation (Name,Age of Onset)    Unknown/Adopted (3) Mother, Father, Other        Past Surgical History:   Procedure Laterality Date     TONSILLECTOMY & ADENOIDECTOMY      as a child     Social History     Socioeconomic History     Marital status: Single     Spouse name: None     Number of children: None     Years of education: None     Highest education level: None   Occupational History     None   Social Needs     Financial resource strain: None     Food insecurity     Worry: None     Inability: None     Transportation needs     Medical: None     Non-medical: None   Tobacco Use     Smoking status: Never Smoker     Smokeless tobacco: Never Used     Tobacco comment: around 2nd hand smoke   Substance and Sexual Activity     Alcohol use: Not Currently     Comment: rare wine ( very rare)     Drug use: No     Sexual activity: Not Currently     Partners: Male     Birth control/protection: I.U.D.   Lifestyle     Physical activity     Days per week: None     Minutes per session: None     Stress: None   Relationships     Social connections     Talks on phone: None     Gets together: None     Attends Taoist service: None     Active member of club or organization: None     Attends meetings of clubs or organizations: None     Relationship status: None     Intimate partner violence     Fear of current or ex partner: None     Emotionally abused: None     Physically abused: None     Forced sexual activity: None   Other Topics Concern     Parent/sibling w/ CABG, MI or angioplasty before 65F 55M? No     Comment: patient was adopted; unknown family history   Social History Narrative    One child    Education: some college        March 19, 2021    ENVIRONMENTAL HISTORY: [place]  [place] The home is heated with a electric furnace. They do not have central air conditioning, does have box air conditioner in the wall and has air purifier. The patient's bedroom is furnished with stuffed animals in bed, carpeting in bedroom and fabric window coverings. No pets. There is history of cockroach or mice infestation. There are no smokers in the house.  The apartment does not have a basement.            Review of Systems   HENT: Positive for congestion. Negative for postnasal drip, rhinorrhea, sinus pressure, sinus pain and sneezing.    Respiratory: Positive for shortness of breath. Negative for cough, chest tightness and wheezing.    Allergic/Immunologic: Positive for environmental allergies.           Current Outpatient Medications:      albuterol (PROAIR HFA/PROVENTIL HFA/VENTOLIN HFA) 108 (90 Base) MCG/ACT inhaler, Inhale 2 puffs into the lungs every 4 hours as needed for wheezing or shortness of breath / dyspnea, Disp: 18 g, Rfl: 3     albuterol (PROAIR HFA/PROVENTIL HFA/VENTOLIN HFA) 108 (90 Base) MCG/ACT inhaler, Inhale 2 puffs into the lungs every 6 hours as needed for shortness of breath / dyspnea or wheezing, Disp: 18 g, Rfl: 0     azelastine (ASTELIN) 0.1 % nasal spray, Spray 2 sprays in nostril daily as needed for rhinitis or allergies, Disp: 30 mL, Rfl: 3     benztropine (COGENTIN) 0.5 MG tablet, Take 0.5 mg by mouth daily as needed , Disp: , Rfl:      hydrOXYzine (ATARAX) 10 MG tablet, Take 1 tablet (10 mg) by mouth daily as needed for itching (nasal al,lergy symptoms) (Patient taking differently: Take 10 mg by mouth 3 times daily as needed for itching (nasal al,lergy symptoms) ), Disp: 90 tablet, Rfl: 1     levonorgestrel-ethinyl estradiol (AVIANE) 0.1-20 MG-MCG tablet, Take 1 tablet by mouth daily, Disp: 84 tablet, Rfl: 3     Melatonin 10 MG TABS tablet, Take 10 mg by mouth nightly as needed for sleep, Disp: , Rfl:      sertraline (ZOLOFT) 100 MG  tablet, Take 200 mg by mouth every evening, Disp: , Rfl:      VRAYLAR 4.5 MG CAPS capsule, Take 4.5 mg by mouth daily , Disp: , Rfl:      EPINEPHrine (EPIPEN/ADRENACLICK/OR ANY BX GENERIC EQUIV) 0.3 MG/0.3ML injection 2-pack, Inject 0.3 mLs (0.3 mg) into the muscle as needed for anaphylaxis (Patient not taking: Reported on 3/19/2021), Disp: 0.6 mL, Rfl: 3     fluticasone (FLONASE) 50 MCG/ACT nasal spray, Spray 1-2 sprays into both nostrils daily (Patient not taking: Reported on 3/19/2021), Disp: 16 g, Rfl: 3     ORDER FOR ALLERGEN IMMUNOTHERAPY, Cat Hair, Standardized 10,000 BAU/mL, ALK  3.0 ml Dog Hair Dander, A. P.  1:100 w/v, HS  1.0 ml Dust Mites F 30,000AU/mL, HS  0.5 ml Dust Mites P. 30,000 AU/mL, HS  0.5 ml  Diluent: HSA qs to 5ml, Disp: 5 mL, Rfl: PRN     ORDER FOR ALLERGEN IMMUNOTHERAPY, Alternaria Tenuis 1:10 w/v, HS  0.5 ml Epicoccum Nigrum 1:10 w/v, HS 0.5 ml Hormodendrum Cladosporioides 1:10 w/v, HS 0.5 ml Diluent: HSA qs to 5ml, Disp: 5 mL, Rfl: PRN     ORDER FOR ALLERGEN IMMUNOTHERAPY, Estuardo, White  1:20 w/v, HS  0.5 ml Birch Mix PRW 1:20 w/v, HS  0.5 ml Elm, American 1:20 w/v, HS  0.5 ml Hackberry 1:20 w/v, HS 0.5 ml Hickory, Shagbark 1:20 w/v, HS  0.5 ml [place] Mix RW 1:20 w/v, HS 0.5 ml Oak Mix RVW 1:20 w/v, HS 0.5 ml [place] Tree, Black 1:20 w/v, HS 0.5 ml [place], Black 1:20 w/v, HS 0.5 ml Diluent: HSA qs to 5ml, Disp: 5 mL, Rfl: PRN     ORDER FOR ALLERGEN IMMUNOTHERAPY, Kochia 1:20 w/v, HS 1.0 ml Nettle 1:20 w/v, HS 1.0 ml Plantain, English 1:20 w/v, HS 1.0 ml Ragweed Mixed 1:20 w/v ALK  0.6 ml Russian Thistle 1:20 w/v, HS 1.0 ml Diluent: HSA qs to 5ml, Disp: 5 mL, Rfl: PRN     Pseudoephedrine HCl (SUDAFED PO), Take by mouth as needed for congestion, Disp: , Rfl:   Immunization History   Administered Date(s) Administered     DTAP (<7y) 03/01/1985, 06/01/1985, 07/01/1985, 07/01/1986, 07/26/1990     HPV Quadrivalent 12/12/2008, 09/06/2011     Hep B, Peds or Adolescent 02/16/1998     Historical  DTP/aP 03/01/1985, 06/01/1985, 07/01/1985, 07/01/1986, 07/26/1990     Historical Hepb 02/16/1998     Influenza (IIV3) PF 12/09/2008, 12/06/2012     Influenza Vaccine IM > 6 months Valent IIV4 11/21/2017, 01/03/2019, 09/17/2019, 09/16/2020     MMR 02/27/1986, 07/17/1997     Poliovirus, inactivated (IPV) 06/01/1985, 07/01/1985, 01/01/1987, 07/26/1990     TDAP Vaccine (Adacel) 06/30/1997, 03/31/2008, 05/14/2010, 12/06/2012     Td (Adult), Adsorbed 06/30/1997     Allergies   Allergen Reactions     Animal Dander      Other reaction(s): *Unknown     Metformin Fatigue     Mold      Other reaction(s): Runny Nose     Trees      OBJECTIVE:                                                                 BP (!) 138/90 (BP Location: Left arm, Patient Position: Sitting, Cuff Size: Adult Large)   Pulse 109   Temp 98.5  F (36.9  C) (Tympanic)   Wt 108.4 kg (238 lb 15.7 oz)   SpO2 96%   BMI 42.33 kg/m          Physical Exam  Vitals signs and nursing note reviewed.   Constitutional:       General: She is not in acute distress.     Appearance: She is not diaphoretic.   HENT:      Head: Normocephalic and atraumatic.      Right Ear: Tympanic membrane, ear canal and external ear normal.      Left Ear: Tympanic membrane, ear canal and external ear normal.      Nose: Septal deviation (mild, to the right) present. No mucosal edema or rhinorrhea.      Mouth/Throat:      Mouth: Mucous membranes are moist.      Pharynx: Oropharynx is clear. No oropharyngeal exudate.   Eyes:      General:         Right eye: No discharge.         Left eye: No discharge.      Conjunctiva/sclera:      Right eye: Right conjunctiva is not injected.      Left eye: Left conjunctiva is not injected.   Neck:      Musculoskeletal: Normal range of motion.   Cardiovascular:      Rate and Rhythm: Normal rate and regular rhythm.      Heart sounds: Normal heart sounds. No murmur.   Pulmonary:      Effort: Pulmonary effort is normal. No respiratory distress.      Breath  sounds: Normal breath sounds and air entry. No decreased breath sounds, wheezing, rhonchi or rales.   Musculoskeletal: Normal range of motion.   Lymphadenopathy:      Cervical: No cervical adenopathy.   Skin:     General: Skin is warm.   Neurological:      Mental Status: She is alert and oriented to person, place, and time.   Psychiatric:         Mood and Affect: Mood normal.         Behavior: Behavior normal.               WORKUP:   ACT Score:  17    ASSESSMENT/PLAN:    Chronic seasonal allergic rhinitis due to pollen  Allergic rhinitis due to mold  Allergic rhinitis due to dust mite  Allergic rhinitis due to animal dander    She stopped allergen immunotherapy in the Fall. She is worried about getting sick with COVID-19 if she comes to clinic.    Today we discussed 2 options:  Option #1. Restart allergy shots within the next month and continue them for 3 years. We will do our best to the possibility of infection.  Option #2. She received allergy injections for about 2 years. Her symptoms improved. It is hard to say how long she will have a sustained effect. We could wait and see while treating her symptoms with medications.    Maddy chose option #2.  I will see her back in 6 months.  At that time, if she continues doing well, nothing new needs to be done.  If her symptoms get worse, then we can discuss restarting allergen immunotherapy again.  Meanwhile, she can continue using azelastine nasal spray.  She can increase it up to 2 sprays in each nostril twice daily if needed to address nasal congestion.  If it does not help, she can add intranasal fluticasone 1-2 sprays in each nostril once daily.      - azelastine (ASTELIN) 0.1 % nasal spray  Dispense: 30 mL; Refill: 3    Shortness of breath     She can try using an albuterol inhaler preexertionally and as needed.  -I also ordered a pulmonary function test. Depending on albuterol effectiveness and results of PFT, may consider adding a controller.     - General PFT  Lab (Please always keep checked)  - Pulmonary Function Test  - albuterol (PROAIR HFA/PROVENTIL HFA/VENTOLIN HFA) 108 (90 Base) MCG/ACT inhaler  Dispense: 18 g; Refill: 3       Return in about 6 months (around 9/19/2021), or if symptoms worsen or fail to improve.    Thank you for allowing us to participate in the care of this patient. Please feel free to contact us if there are any questions or concerns about the patient.    Disclaimer: This note consists of symbols derived from keyboarding, dictation and/or voice recognition software. As a result, there may be errors in the script that have gone undetected. Please consider this when interpreting information found in this chart.    Rudy Shin MD, FAAAAI, FACAAI  Allergy, Asthma and Immunology    [facility]

## 2021-03-19 NOTE — LETTER
3/19/2021         RE: Maddy Ortiz  [address]  [facility] [address]        Dear Colleague,    Thank you for referring your patient, Maddy Ortiz, to the [facility]. Please see a copy of my visit note below.    SUBJECTIVE:                                                                   Maddy Ortiz presents today to our Allergy Clinic at [facility] for a follow up visit. She is a 35 year old female with allergic rhinitis.      Percutaneous skin puncture testing for aeroallergens performed in November 2016 showed sensitivity to dog, cat, dust mite, molds, pollen of trees, grass, and weeds. In spring-summer 2018, she had a buildup using cluster schedule for allergen immunotherapy.  She reached maintenance dose in July 2018.  One episode of anaphylaxis on September 8, 2020, due to allergen immunotherapy, Red 1:1, 0.5mL  Was given epi x 2.  She tolerated a lowed dose x 1 after that.     These days, her environmental/seasonal allergy symptoms were not as bad as before she started allergen immunotherapy.  Besides mild nasal congestion, she denies sneezing, postnasal drainage, or itchy/watery eyes.  The only thing is that nasal congestion makes her sound a bit nasally.  She uses azelastine at night. Does not need to use intranasal steroids.    She had pneumonia in November 2020. Was using albuterol at that time. These days, she develops dyspnea each time she performs a strenuous physical activity. She does not use albuterol for that. She has not tried using it preexertionally either. Denies any nighttime chest symptoms.        Patient Active Problem List   Diagnosis     Animal dander allergy     CARDIOVASCULAR SCREENING; LDL GOAL LESS THAN 160     Psychosis (H)     Paranoid type delusional disorder (H)     Bipolar 1 disorder (H)     Insomnia     Depression with anxiety     Seasonal allergic rhinitis due to pollen     Allergic rhinitis due to mold      Allergic rhinitis due to animal dander     Allergic rhinitis due to dust mite     Encounter for IUD insertion     Schizoaffective disorder, bipolar type (H)     Gastroesophageal reflux disease without esophagitis     Paranoia (psychosis) (H)     Obesity (BMI 35.0-39.9) with comorbidity (H)       Past Medical History:   Diagnosis Date     Bipolar 1 disorder (H) 12/6/2012     Depressive disorder      Paranoid type delusional disorder (H) 11/14/2012      *Patient is Adopted       Problem (# of Occurrences) Relation (Name,Age of Onset)    Unknown/Adopted (3) Mother, Father, Other        Past Surgical History:   Procedure Laterality Date     TONSILLECTOMY & ADENOIDECTOMY      as a child     Social History     Socioeconomic History     Marital status: Single     Spouse name: None     Number of children: None     Years of education: None     Highest education level: None   Occupational History     None   Social Needs     Financial resource strain: None     Food insecurity     Worry: None     Inability: None     Transportation needs     Medical: None     Non-medical: None   Tobacco Use     Smoking status: Never Smoker     Smokeless tobacco: Never Used     Tobacco comment: around 2nd hand smoke   Substance and Sexual Activity     Alcohol use: Not Currently     Comment: rare wine ( very rare)     Drug use: No     Sexual activity: Not Currently     Partners: Male     Birth control/protection: I.U.D.   Lifestyle     Physical activity     Days per week: None     Minutes per session: None     Stress: None   Relationships     Social connections     Talks on phone: None     Gets together: None     Attends [employer] service: None     Active member of club or organization: None     Attends meetings of clubs or organizations: None     Relationship status: None     Intimate partner violence     Fear of current or ex partner: None     Emotionally abused: None     Physically abused: None     Forced sexual activity: None   Other  Topics Concern     Parent/sibling w/ CABG, MI or angioplasty before 65F 55M? No     Comment: patient was adopted; unknown family history   Social History Narrative    One child    Education: some college        March 19, 2021    ENVIRONMENTAL HISTORY: [place] The home is heated with a electric furnace. They do not have central air conditioning, does have box air conditioner in the wall and has air purifier. The patient's bedroom is furnished with stuffed animals in bed, carpeting in bedroom and fabric window coverings. No pets. There is history of cockroach or mice infestation. There are no smokers in the house.  The apartment does not have a basement.            Review of Systems   HENT: Positive for congestion. Negative for postnasal drip, rhinorrhea, sinus pressure, sinus pain and sneezing.    Respiratory: Positive for shortness of breath. Negative for cough, chest tightness and wheezing.    Allergic/Immunologic: Positive for environmental allergies.           Current Outpatient Medications:      albuterol (PROAIR HFA/PROVENTIL HFA/VENTOLIN HFA) 108 (90 Base) MCG/ACT inhaler, Inhale 2 puffs into the lungs every 4 hours as needed for wheezing or shortness of breath / dyspnea, Disp: 18 g, Rfl: 3     albuterol (PROAIR HFA/PROVENTIL HFA/VENTOLIN HFA) 108 (90 Base) MCG/ACT inhaler, Inhale 2 puffs into the lungs every 6 hours as needed for shortness of breath / dyspnea or wheezing, Disp: 18 g, Rfl: 0     azelastine (ASTELIN) 0.1 % nasal spray, Spray 2 sprays in nostril daily as needed for rhinitis or allergies, Disp: 30 mL, Rfl: 3     benztropine (COGENTIN) 0.5 MG tablet, Take 0.5 mg by mouth daily as needed , Disp: , Rfl:      hydrOXYzine (ATARAX) 10 MG tablet, Take 1 tablet (10 mg) by mouth daily as needed for itching (nasal al,lergy symptoms) (Patient taking differently: Take 10 mg by mouth 3 times daily as needed for itching (nasal al,lergy symptoms) ), Disp: 90  tablet, Rfl: 1     levonorgestrel-ethinyl estradiol (AVIANE) 0.1-20 MG-MCG tablet, Take 1 tablet by mouth daily, Disp: 84 tablet, Rfl: 3     Melatonin 10 MG TABS tablet, Take 10 mg by mouth nightly as needed for sleep, Disp: , Rfl:      sertraline (ZOLOFT) 100 MG tablet, Take 200 mg by mouth every evening, Disp: , Rfl:      VRAYLAR 4.5 MG CAPS capsule, Take 4.5 mg by mouth daily , Disp: , Rfl:      EPINEPHrine (EPIPEN/ADRENACLICK/OR ANY BX GENERIC EQUIV) 0.3 MG/0.3ML injection 2-pack, Inject 0.3 mLs (0.3 mg) into the muscle as needed for anaphylaxis (Patient not taking: Reported on 3/19/2021), Disp: 0.6 mL, Rfl: 3     fluticasone (FLONASE) 50 MCG/ACT nasal spray, Spray 1-2 sprays into both nostrils daily (Patient not taking: Reported on 3/19/2021), Disp: 16 g, Rfl: 3     ORDER FOR ALLERGEN IMMUNOTHERAPY, Cat Hair, Standardized 10,000 BAU/mL, ALK  3.0 ml Dog Hair Dander, A. P.  1:100 w/v, HS  1.0 ml Dust Mites F 30,000AU/mL, HS  0.5 ml Dust Mites P. 30,000 AU/mL, HS  0.5 ml  Diluent: HSA qs to 5ml, Disp: 5 mL, Rfl: PRN     ORDER FOR ALLERGEN IMMUNOTHERAPY, Alternaria Tenuis 1:10 w/v, HS  0.5 ml Epicoccum Nigrum 1:10 w/v, HS 0.5 ml Hormodendrum Cladosporioides 1:10 w/v, HS 0.5 ml Diluent: HSA qs to 5ml, Disp: 5 mL, Rfl: PRN     ORDER FOR ALLERGEN IMMUNOTHERAPY, Estuardo, White  1:20 w/v, HS  0.5 ml Birch Mix PRW 1:20 w/v, HS  0.5 ml Elm, American 1:20 w/v, HS  0.5 ml Hackberry 1:20 w/v, HS 0.5 ml Hickory, Shagbark 1:20 w/v, HS  0.5 ml [place] Mix RW 1:20 w/v, HS 0.5 ml Oak Mix RVW 1:20 w/v, HS 0.5 ml [place] Tree, Black 1:20 w/v, HS 0.5 ml [place], Black 1:20 w/v, HS 0.5 ml Diluent: HSA qs to 5ml, Disp: 5 mL, Rfl: PRN     ORDER FOR ALLERGEN IMMUNOTHERAPY, Kochia 1:20 w/v, HS 1.0 ml Nettle 1:20 w/v, HS 1.0 ml Plantain, English 1:20 w/v, HS 1.0 ml Ragweed Mixed 1:20 w/v ALK  0.6 ml Russian Thistle 1:20 w/v, HS 1.0 ml Diluent: HSA qs to 5ml, Disp: 5 mL, Rfl: PRN     Pseudoephedrine HCl (SUDAFED PO), Take by mouth as needed for  congestion, Disp: , Rfl:   Immunization History   Administered Date(s) Administered     DTAP (<7y) 03/01/1985, 06/01/1985, 07/01/1985, 07/01/1986, 07/26/1990     HPV Quadrivalent 12/12/2008, 09/06/2011     Hep B, Peds or Adolescent 02/16/1998     Historical DTP/aP 03/01/1985, 06/01/1985, 07/01/1985, 07/01/1986, 07/26/1990     Historical Hepb 02/16/1998     Influenza (IIV3) PF 12/09/2008, 12/06/2012     Influenza Vaccine IM > 6 months Valent IIV4 11/21/2017, 01/03/2019, 09/17/2019, 09/16/2020     MMR 02/27/1986, 07/17/1997     Poliovirus, inactivated (IPV) 06/01/1985, 07/01/1985, 01/01/1987, 07/26/1990     TDAP Vaccine (Adacel) 06/30/1997, 03/31/2008, 05/14/2010, 12/06/2012     Td (Adult), Adsorbed 06/30/1997     Allergies   Allergen Reactions     Animal Dander      Other reaction(s): *Unknown     Metformin Fatigue     Mold      Other reaction(s): Runny Nose     Trees      OBJECTIVE:                                                                 BP (!) 138/90 (BP Location: Left arm, Patient Position: Sitting, Cuff Size: Adult Large)   Pulse 109   Temp 98.5  F (36.9  C) (Tympanic)   Wt 108.4 kg (238 lb 15.7 oz)   SpO2 96%   BMI 42.33 kg/m          Physical Exam  Vitals signs and nursing note reviewed.   Constitutional:       General: She is not in acute distress.     Appearance: She is not diaphoretic.   HENT:      Head: Normocephalic and atraumatic.      Right Ear: Tympanic membrane, ear canal and external ear normal.      Left Ear: Tympanic membrane, ear canal and external ear normal.      Nose: Septal deviation (mild, to the right) present. No mucosal edema or rhinorrhea.      Mouth/Throat:      Mouth: Mucous membranes are moist.      Pharynx: Oropharynx is clear. No oropharyngeal exudate.   Eyes:      General:         Right eye: No discharge.         Left eye: No discharge.      Conjunctiva/sclera:      Right eye: Right conjunctiva is not injected.      Left eye: Left conjunctiva is not injected.   Neck:       Musculoskeletal: Normal range of motion.   Cardiovascular:      Rate and Rhythm: Normal rate and regular rhythm.      Heart sounds: Normal heart sounds. No murmur.   Pulmonary:      Effort: Pulmonary effort is normal. No respiratory distress.      Breath sounds: Normal breath sounds and air entry. No decreased breath sounds, wheezing, rhonchi or rales.   Musculoskeletal: Normal range of motion.   Lymphadenopathy:      Cervical: No cervical adenopathy.   Skin:     General: Skin is warm.   Neurological:      Mental Status: She is alert and oriented to person, place, and time.   Psychiatric:         Mood and Affect: Mood normal.         Behavior: Behavior normal.               WORKUP:   ACT Score:  17    ASSESSMENT/PLAN:    Chronic seasonal allergic rhinitis due to pollen  Allergic rhinitis due to mold  Allergic rhinitis due to dust mite  Allergic rhinitis due to animal dander    She stopped allergen immunotherapy in the Fall. She is worried about getting sick with COVID-19 if she comes to clinic.    Today we discussed 2 options:  Option #1. Restart allergy shots within the next month and continue them for 3 years. We will do our best to the possibility of infection.  Option #2. She received allergy injections for about 2 years. Her symptoms improved. It is hard to say how long she will have a sustained effect. We could wait and see while treating her symptoms with medications.    Maddy chose option #2.  I will see her back in 6 months.  At that time, if she continues doing well, nothing new needs to be done.  If her symptoms get worse, then we can discuss restarting allergen immunotherapy again.  Meanwhile, she can continue using azelastine nasal spray.  She can increase it up to 2 sprays in each nostril twice daily if needed to address nasal congestion.  If it does not help, she can add intranasal fluticasone 1-2 sprays in each nostril once daily.      - azelastine (ASTELIN) 0.1 % nasal spray  Dispense: 30 mL;  Refill: 3    Shortness of breath     She can try using an albuterol inhaler preexertionally and as needed.  -I also ordered a pulmonary function test. Depending on albuterol effectiveness and results of PFT, may consider adding a controller.     - General PFT Lab (Please always keep checked)  - Pulmonary Function Test  - albuterol (PROAIR HFA/PROVENTIL HFA/VENTOLIN HFA) 108 (90 Base) MCG/ACT inhaler  Dispense: 18 g; Refill: 3       Return in about 6 months (around 9/19/2021), or if symptoms worsen or fail to improve.    Thank you for allowing us to participate in the care of this patient. Please feel free to contact us if there are any questions or concerns about the patient.    Disclaimer: This note consists of symbols derived from keyboarding, dictation and/or voice recognition software. As a result, there may be errors in the script that have gone undetected. Please consider this when interpreting information found in this chart.    Rudy Shin MD, FAAAAI, FACJERICAI  Allergy, Asthma and Immunology    [facility]         Again, thank you for allowing me to participate in the care of your patient.        Sincerely,        Rudy Shin MD

## 2021-03-20 ASSESSMENT — ASTHMA QUESTIONNAIRES: ACT_TOTALSCORE: 17

## 2021-03-22 ASSESSMENT — ENCOUNTER SYMPTOMS
CHEST TIGHTNESS: 0
COUGH: 0
RHINORRHEA: 0
SHORTNESS OF BREATH: 1
SINUS PAIN: 0
SINUS PRESSURE: 0
WHEEZING: 0

## 2021-04-13 ENCOUNTER — NURSE TRIAGE (OUTPATIENT)
Dept: NURSING | Facility: CLINIC | Age: 36
End: 2021-04-13

## 2021-04-13 ENCOUNTER — TELEPHONE (OUTPATIENT)
Dept: FAMILY MEDICINE | Facility: CLINIC | Age: 36
End: 2021-04-13

## 2021-04-13 NOTE — TELEPHONE ENCOUNTER
Left non-detailed message for patient to return a call to the clinic RN.       Pt last seen 10/22/20 by Mary Anne Tello for IUD removal.      I see a referral in pt's chart dated 8/13/20 for pelvic floor work.  Looks like prior auth was needed.  Did pt pursue this?  Need more information.    SHIRA Quinn RN

## 2021-04-13 NOTE — TELEPHONE ENCOUNTER
Wondering if she can get a prescription for 2 things    Oral med for atheletes foot  Something that will help her adangels     Refuses triaged, just wants these things prescribed.     Advised she will need an OV with a provider for these prescriptions. Patient would like a virtual visit. Transferred to scheduling for appointment.     Reason for Disposition    Caller requesting a NON-URGENT new prescription or refill and triager unable to refill per unit policy    Protocols used: MEDICATION QUESTION CALL-A-    Priscilla Phillips RN on 4/13/2021 at 3:12 AM

## 2021-04-15 ENCOUNTER — OFFICE VISIT (OUTPATIENT)
Dept: PODIATRY | Facility: CLINIC | Age: 36
End: 2021-04-15
Payer: MEDICARE

## 2021-04-15 VITALS
BODY MASS INDEX: 42.17 KG/M2 | HEIGHT: 63 IN | SYSTOLIC BLOOD PRESSURE: 143 MMHG | DIASTOLIC BLOOD PRESSURE: 104 MMHG | WEIGHT: 238 LBS | HEART RATE: 107 BPM

## 2021-04-15 DIAGNOSIS — B35.3 TINEA PEDIS OF BOTH FEET: Primary | ICD-10-CM

## 2021-04-15 DIAGNOSIS — R23.4 FISSURE IN SKIN OF BOTH FEET: ICD-10-CM

## 2021-04-15 PROCEDURE — 99203 OFFICE O/P NEW LOW 30 MIN: CPT | Performed by: PODIATRIST

## 2021-04-15 RX ORDER — PRENATAL VIT 91/IRON/FOLIC/DHA 28-975-200
COMBINATION PACKAGE (EA) ORAL 2 TIMES DAILY
Qty: 12 G | Refills: 1 | Status: ON HOLD | OUTPATIENT
Start: 2021-04-15 | End: 2022-03-29

## 2021-04-15 ASSESSMENT — MIFFLIN-ST. JEOR: SCORE: 1738.69

## 2021-04-15 NOTE — NURSING NOTE
"Chief Complaint   Patient presents with     Consult     athletes foot       Initial BP (!) 143/104   Pulse 107   Ht 1.6 m (5' 3\")   Wt 108 kg (238 lb)   BMI 42.16 kg/m   Estimated body mass index is 42.16 kg/m  as calculated from the following:    Height as of this encounter: 1.6 m (5' 3\").    Weight as of this encounter: 108 kg (238 lb).  Medications and allergies reviewed.      Kathryn PATRICK MA    "

## 2021-04-15 NOTE — PATIENT INSTRUCTIONS
Dry Skin Treatment Recommendations    1. Apply to dry skin daily.  a. Best time to apply creams is right after a shower or bath.  2. For thick, dry, cracked skin  a. First, apply a Vitamin D cream  i. Maxasorb D3 cream  b. Second, apply a keratolytic cream to help break down the thick, hard skin  i. CeraVe Moisturizing Cream for Psoriasis Treatment  8 Oz  With Salicylic Acid & Urea  ii. PurSources Urea 40% Foot Cream 4 oz.  3. For dry itchy skin on the lower legs  a. CeraVe Moisturizing Cream for Itch Relief  12 Ounce  Dry Skin Itch Relief Cream with Pramoxine Hydrochloride  Fragrance Free

## 2021-04-15 NOTE — LETTER
4/15/2021         RE: Maddy Ortiz  [address]  [facility] [address]        Dear Colleague,    Thank you for referring your patient, Maddy Ortiz, to the [facility] [place]. Please see a copy of my visit note below.    PATIENT HISTORY:  Maddy Ortiz is a 35 year old female who presents to clinic with a chief complaint of athletes foot.  .  The patient relates the pain is primarily located around the both feet.  The patient relates that the problem has been going on for terbinafine and is getting worse.  The patient relates trying ointment with relief.  The patient is currently employed as a .  The patient was referred by Rachel Phillips RN for consultation on the bilateral foot.  Any previous notes and studies that pertain to the patient's condition were reviewed.    Pertinent medical, surgical and family history was reviewed in Epic      Medications:   Current Outpatient Medications:      albuterol (PROAIR HFA/PROVENTIL HFA/VENTOLIN HFA) 108 (90 Base) MCG/ACT inhaler, Inhale 2 puffs into the lungs every 4 hours as needed for wheezing or shortness of breath / dyspnea, Disp: 18 g, Rfl: 3     albuterol (PROAIR HFA/PROVENTIL HFA/VENTOLIN HFA) 108 (90 Base) MCG/ACT inhaler, Inhale 2 puffs into the lungs every 6 hours as needed for shortness of breath / dyspnea or wheezing, Disp: 18 g, Rfl: 0     azelastine (ASTELIN) 0.1 % nasal spray, Spray 2 sprays in nostril daily as needed for rhinitis or allergies, Disp: 30 mL, Rfl: 3     benztropine (COGENTIN) 0.5 MG tablet, Take 0.5 mg by mouth daily as needed , Disp: , Rfl:      hydrOXYzine (ATARAX) 10 MG tablet, Take 1 tablet (10 mg) by mouth daily as needed for itching (nasal al,lergy symptoms) (Patient taking differently: Take 10 mg by mouth 3 times daily as needed for itching (nasal al,lergy symptoms) ), Disp: 90 tablet, Rfl: 1     levonorgestrel-ethinyl estradiol (AVIANE) 0.1-20 MG-MCG tablet, Take 1 tablet by mouth  daily, Disp: 84 tablet, Rfl: 3     Melatonin 10 MG TABS tablet, Take 10 mg by mouth nightly as needed for sleep, Disp: , Rfl:      ORDER FOR ALLERGEN IMMUNOTHERAPY, Cat Hair, Standardized 10,000 BAU/mL, ALK  3.0 ml Dog Hair Dander, A. P.  1:100 w/v, HS  1.0 ml Dust Mites F 30,000AU/mL, HS  0.5 ml Dust Mites P. 30,000 AU/mL, HS  0.5 ml  Diluent: HSA qs to 5ml, Disp: 5 mL, Rfl: PRN     ORDER FOR ALLERGEN IMMUNOTHERAPY, Alternaria Tenuis 1:10 w/v, HS  0.5 ml Epicoccum Nigrum 1:10 w/v, HS 0.5 ml Hormodendrum Cladosporioides 1:10 w/v, HS 0.5 ml Diluent: HSA qs to 5ml, Disp: 5 mL, Rfl: PRN     ORDER FOR ALLERGEN IMMUNOTHERAPY, Estuardo, White  1:20 w/v, HS  0.5 ml Birch Mix PRW 1:20 w/v, HS  0.5 ml Elm, American 1:20 w/v, HS  0.5 ml Hackberry 1:20 w/v, HS 0.5 ml Hickory, Shagbark 1:20 w/v, HS  0.5 ml [place] Mix RW 1:20 w/v, HS 0.5 ml Oak Mix RVW 1:20 w/v, HS 0.5 ml Pearson Tree, Black 1:20 w/v, HS 0.5 ml [place], Black 1:20 w/v, HS 0.5 ml Diluent: HSA qs to 5ml, Disp: 5 mL, Rfl: PRN     ORDER FOR ALLERGEN IMMUNOTHERAPY, Kochia 1:20 w/v, HS 1.0 ml Nettle 1:20 w/v, HS 1.0 ml Plantain, English 1:20 w/v, HS 1.0 ml Ragweed Mixed 1:20 w/v ALK  0.6 ml Russian Thistle 1:20 w/v, HS 1.0 ml Diluent: HSA qs to 5ml, Disp: 5 mL, Rfl: PRN     Pseudoephedrine HCl (SUDAFED PO), Take by mouth as needed for congestion, Disp: , Rfl:      sertraline (ZOLOFT) 100 MG tablet, Take 200 mg by mouth every evening, Disp: , Rfl:      terbinafine (LAMISIL AT) 1 % external cream, Apply topically 2 times daily, Disp: 12 g, Rfl: 1     VRAYLAR 4.5 MG CAPS capsule, Take 4.5 mg by mouth daily , Disp: , Rfl:      EPINEPHrine (EPIPEN/ADRENACLICK/OR ANY BX GENERIC EQUIV) 0.3 MG/0.3ML injection 2-pack, Inject 0.3 mLs (0.3 mg) into the muscle as needed for anaphylaxis (Patient not taking: Reported on 3/19/2021), Disp: 0.6 mL, Rfl: 3     fluticasone (FLONASE) 50 MCG/ACT nasal spray, Spray 1-2 sprays into both nostrils daily (Patient not taking: Reported on 3/19/2021),  "Disp: 16 g, Rfl: 3     Allergies:    Allergies   Allergen Reactions     Animal Dander      Other reaction(s): *Unknown     Metformin Fatigue     Mold      Other reaction(s): Runny Nose     Trees        Vitals: BP (!) 143/104   Pulse 107   Ht 1.6 m (5' 3\")   Wt 108 kg (238 lb)   BMI 42.16 kg/m    BMI= Body mass index is 42.16 kg/m .    LOWER EXTREMITY PHYSICAL EXAM    Dermatologic: Noted hyperkeratotic skin buildup around both heels with fissuring noted.  Minimal surrounding erythema noted.  Skin is intact to right and left lower extremities without significant lesions, rash or abrasion.        Vascular: DP & PT pulses are intact & regular on the right and left.   CFT and skin temperature is normal to the right and left lower extremities.     Neurologic: Lower extremity sensation is intact to light touch.  No evidence of weakness in the right and left lower extremities.        Musculoskeletal: Patient is ambulatory without assistive device or brace.  No gross ankle deformity noted.  No foot or ankle joint effusion is noted.            ASSESSMENT / PLAN:     ICD-10-CM    1. Tinea pedis of both feet  B35.3 terbinafine (LAMISIL AT) 1 % external cream   2. Fissure in skin of both feet  R23.4 Ankle/Foot Bracing Supplies DME       I have explained to Maddy about the conditions.  We discussed the underlying contributing factors of the condition as well as both conservative and surgical treatment options along with expected length of recovery.  At this time, the patient was prescribed terbinafine cream to be applied to the bottoms of both feet twice daily for 2 weeks.  The patient was dispensed a silicone heel cup that will help to offload the pressure causing the fissuring of the skin on both heels.  The patient was instructed return to the office if problems persist.    Maddy verbalized agreement with and understanding of the rational for the diagnosis and treatment plan.  All questions were answered to best of my " ability and the patient's satisfaction. The patient was advised to contact the clinic with any questions that may arise after the clinic visit.      Disclaimer: This note consists of symbols derived from keyboarding, dictation and/or voice recognition software. As a result, there may be errors in the script that have gone undetected. Please consider this when interpreting information found in this chart.       ALCIRA Garza D.P.M., F.A.C.F.A.S.        Again, thank you for allowing me to participate in the care of your patient.        Sincerely,        Rachid Garza DPM

## 2021-04-15 NOTE — PROGRESS NOTES
PATIENT HISTORY:  Maddy Ortiz is a 35 year old female who presents to clinic with a chief complaint of athletes foot.  .  The patient relates the pain is primarily located around the both feet.  The patient relates that the problem has been going on for terbinafine and is getting worse.  The patient relates trying ointment with relief.  The patient is currently employed as a .  The patient was referred by Rachel Phillips RN for consultation on the bilateral foot.  Any previous notes and studies that pertain to the patient's condition were reviewed.    Pertinent medical, surgical and family history was reviewed in Epic      Medications:   Current Outpatient Medications:      albuterol (PROAIR HFA/PROVENTIL HFA/VENTOLIN HFA) 108 (90 Base) MCG/ACT inhaler, Inhale 2 puffs into the lungs every 4 hours as needed for wheezing or shortness of breath / dyspnea, Disp: 18 g, Rfl: 3     albuterol (PROAIR HFA/PROVENTIL HFA/VENTOLIN HFA) 108 (90 Base) MCG/ACT inhaler, Inhale 2 puffs into the lungs every 6 hours as needed for shortness of breath / dyspnea or wheezing, Disp: 18 g, Rfl: 0     azelastine (ASTELIN) 0.1 % nasal spray, Spray 2 sprays in nostril daily as needed for rhinitis or allergies, Disp: 30 mL, Rfl: 3     benztropine (COGENTIN) 0.5 MG tablet, Take 0.5 mg by mouth daily as needed , Disp: , Rfl:      hydrOXYzine (ATARAX) 10 MG tablet, Take 1 tablet (10 mg) by mouth daily as needed for itching (nasal al,lergy symptoms) (Patient taking differently: Take 10 mg by mouth 3 times daily as needed for itching (nasal al,lergy symptoms) ), Disp: 90 tablet, Rfl: 1     levonorgestrel-ethinyl estradiol (AVIANE) 0.1-20 MG-MCG tablet, Take 1 tablet by mouth daily, Disp: 84 tablet, Rfl: 3     Melatonin 10 MG TABS tablet, Take 10 mg by mouth nightly as needed for sleep, Disp: , Rfl:      ORDER FOR ALLERGEN IMMUNOTHERAPY, Cat Hair, Standardized 10,000 BAU/mL, ALK  3.0 ml Dog Hair Dander, A. P.  1:100 w/v, HS  1.0 ml Dust Mites  "F 30,000AU/mL, HS  0.5 ml Dust Mites P. 30,000 AU/mL, HS  0.5 ml  Diluent: HSA qs to 5ml, Disp: 5 mL, Rfl: PRN     ORDER FOR ALLERGEN IMMUNOTHERAPY, Alternaria Tenuis 1:10 w/v, HS  0.5 ml Epicoccum Nigrum 1:10 w/v, HS 0.5 ml Hormodendrum Cladosporioides 1:10 w/v, HS 0.5 ml Diluent: HSA qs to 5ml, Disp: 5 mL, Rfl: PRN     ORDER FOR ALLERGEN IMMUNOTHERAPY, Estuardo, White  1:20 w/v, HS  0.5 ml Birch Mix PRW 1:20 w/v, HS  0.5 ml Elm, American 1:20 w/v, HS  0.5 ml Hackberry 1:20 w/v, HS 0.5 ml Hickory, Shagbark 1:20 w/v, HS  0.5 ml [place] Mix RW 1:20 w/v, HS 0.5 ml Oak Mix RVW 1:20 w/v, HS 0.5 ml [place] Tree, Black 1:20 w/v, HS 0.5 ml [place], Black 1:20 w/v, HS 0.5 ml Diluent: HSA qs to 5ml, Disp: 5 mL, Rfl: PRN     ORDER FOR ALLERGEN IMMUNOTHERAPY, Kochia 1:20 w/v, HS 1.0 ml Nettle 1:20 w/v, HS 1.0 ml Plantain, English 1:20 w/v, HS 1.0 ml Ragweed Mixed 1:20 w/v ALK  0.6 ml Russian Thistle 1:20 w/v, HS 1.0 ml Diluent: HSA qs to 5ml, Disp: 5 mL, Rfl: PRN     Pseudoephedrine HCl (SUDAFED PO), Take by mouth as needed for congestion, Disp: , Rfl:      sertraline (ZOLOFT) 100 MG tablet, Take 200 mg by mouth every evening, Disp: , Rfl:      terbinafine (LAMISIL AT) 1 % external cream, Apply topically 2 times daily, Disp: 12 g, Rfl: 1     VRAYLAR 4.5 MG CAPS capsule, Take 4.5 mg by mouth daily , Disp: , Rfl:      EPINEPHrine (EPIPEN/ADRENACLICK/OR ANY BX GENERIC EQUIV) 0.3 MG/0.3ML injection 2-pack, Inject 0.3 mLs (0.3 mg) into the muscle as needed for anaphylaxis (Patient not taking: Reported on 3/19/2021), Disp: 0.6 mL, Rfl: 3     fluticasone (FLONASE) 50 MCG/ACT nasal spray, Spray 1-2 sprays into both nostrils daily (Patient not taking: Reported on 3/19/2021), Disp: 16 g, Rfl: 3     Allergies:    Allergies   Allergen Reactions     Animal Dander      Other reaction(s): *Unknown     Metformin Fatigue     Mold      Other reaction(s): Runny Nose     Trees        Vitals: BP (!) 143/104   Pulse 107   Ht 1.6 m (5' 3\")   Wt 108 kg " (238 lb)   BMI 42.16 kg/m    BMI= Body mass index is 42.16 kg/m .    LOWER EXTREMITY PHYSICAL EXAM    Dermatologic: Noted hyperkeratotic skin buildup around both heels with fissuring noted.  Minimal surrounding erythema noted.  Skin is intact to right and left lower extremities without significant lesions, rash or abrasion.        Vascular: DP & PT pulses are intact & regular on the right and left.   CFT and skin temperature is normal to the right and left lower extremities.     Neurologic: Lower extremity sensation is intact to light touch.  No evidence of weakness in the right and left lower extremities.        Musculoskeletal: Patient is ambulatory without assistive device or brace.  No gross ankle deformity noted.  No foot or ankle joint effusion is noted.            ASSESSMENT / PLAN:     ICD-10-CM    1. Tinea pedis of both feet  B35.3 terbinafine (LAMISIL AT) 1 % external cream   2. Fissure in skin of both feet  R23.4 Ankle/Foot Bracing Supplies DME       I have explained to Maddy about the conditions.  We discussed the underlying contributing factors of the condition as well as both conservative and surgical treatment options along with expected length of recovery.  At this time, the patient was prescribed terbinafine cream to be applied to the bottoms of both feet twice daily for 2 weeks.  The patient was dispensed a silicone heel cup that will help to offload the pressure causing the fissuring of the skin on both heels.  The patient was instructed return to the office if problems persist.    Maddy verbalized agreement with and understanding of the rational for the diagnosis and treatment plan.  All questions were answered to best of my ability and the patient's satisfaction. The patient was advised to contact the clinic with any questions that may arise after the clinic visit.      Disclaimer: This note consists of symbols derived from keyboarding, dictation and/or voice recognition software. As a result,  there may be errors in the script that have gone undetected. Please consider this when interpreting information found in this chart.       ALCIRA Garza D.P.M., [email or web address].

## 2021-04-16 NOTE — TELEPHONE ENCOUNTER
Patient was instructed to return call to [facility] main line at [phone] to speak with an RN.    Chiquis Roe RN  [facility]

## 2021-04-19 ENCOUNTER — TRANSFERRED RECORDS (OUTPATIENT)
Dept: HEALTH INFORMATION MANAGEMENT | Facility: CLINIC | Age: 36
End: 2021-04-19

## 2021-04-20 ENCOUNTER — TELEPHONE (OUTPATIENT)
Dept: FAMILY MEDICINE | Facility: CLINIC | Age: 36
End: 2021-04-20

## 2021-04-20 NOTE — TELEPHONE ENCOUNTER
S-(situation): Patient calling us to report she just doesn't feel good. She is very vague and talks about maybe she should talk to her allergist.    B-(background): Patient was tested neg for covid in Nov.    A-(assessment):Patient has sore back  and stuffy nose.   Feels tired  Thinks it may be her allergies   Denies fever  Denies chest pain or shortness of breath  Denies cough    R-(recommendations): Patient has made an appt with Shirley Freeman , video, for Fri 4/20  She will self quarantine until then and find out if she needs covid test.  Jennifer Borrego RN

## 2021-04-22 ENCOUNTER — TRANSFERRED RECORDS (OUTPATIENT)
Dept: HEALTH INFORMATION MANAGEMENT | Facility: CLINIC | Age: 36
End: 2021-04-22

## 2021-04-23 ENCOUNTER — NURSE TRIAGE (OUTPATIENT)
Dept: NURSING | Facility: CLINIC | Age: 36
End: 2021-04-23

## 2021-04-23 NOTE — TELEPHONE ENCOUNTER
"Pt reports feeling overwhelmed by thoughts but states \"I don't think they are intrusive\". Pt states \"I let my apartment get messy but even when I was cleaning it I could not get away from my worries or thoughts, I just feel my worries are getting out of control\". Pt reports \"I definitely know my thoughts are too much for me to handle\". Pt reports she does not have confusion and sounds calm while speaking to Writer. Pt reports she does have a mental health therapist.     Advised pt to contact her mental health therapist now. Offered pt number for mental health crisis line.     Pt declines crisis phone number and agrees she will contact her mental health therapist and call back with any further questions or concerns.         Additional Information    Patient sounds very upset or troubled to the triager    Protocols used: ANXIETY AND PANIC ATTACK-A-AH      "

## 2021-04-26 ENCOUNTER — VIRTUAL VISIT (OUTPATIENT)
Dept: FAMILY MEDICINE | Facility: CLINIC | Age: 36
End: 2021-04-26
Payer: MEDICARE

## 2021-04-26 DIAGNOSIS — R32 URINARY INCONTINENCE, UNSPECIFIED TYPE: Primary | ICD-10-CM

## 2021-04-26 PROCEDURE — 99213 OFFICE O/P EST LOW 20 MIN: CPT | Mod: 95 | Performed by: NURSE PRACTITIONER

## 2021-04-26 RX ORDER — CARIPRAZINE 6 MG/1
6 CAPSULE, GELATIN COATED ORAL AT BEDTIME
Status: ON HOLD | COMMUNITY
Start: 2021-04-20 | End: 2022-01-11

## 2021-04-26 RX ORDER — MOMETASONE FUROATE MONOHYDRATE 50 UG/1
SPRAY, METERED NASAL
COMMUNITY
Start: 2021-01-04 | End: 2021-05-12

## 2021-04-26 NOTE — PROGRESS NOTES
"Maddy is a 35 year old who is being evaluated via a billable video visit.      How would you like to obtain your AVS? MyChart  If the video visit is dropped, the invitation should be resent by: text [phone]-  Use cell phone for visit. Not mychart.  Will anyone else be joining your video visit? No      Video Start Time: 3:16 PM        Assessment & Plan     Urinary incontinence, unspecified type  - PHYSICAL THERAPY REFERRAL; Future  - *UA reflex to Microscopic and Culture ([place] and [facility] (except [place] and [place]); Future      Patient Instructions   Make a lab appointment for urine sample to check for urine infection.      For treatment of incontinence, recommend pelvic floor therapy. Someone from the therapy department will call you to schedule an appointment.      Return in about 3 months (around 7/26/2021), or if symptoms worsen or fail to improve.    The risks, benefits and treatment options of prescribed medications or other treatments have been discussed with the patient. The patient verbalized their understanding and should call or follow up if no improvement or if they develop further problems.    PHILL Luo [facility]          Subjective   Maddy is a 35 year old who presents for the following health issues     HPI     Concern - incontinence  Onset: chronic for a long time - 2015  Description: patient would like to discuss \"leaking problem\"  She's had for years  Leaking all the time.  Intensity: moderate  Progression of Symptoms:  worsening   Accompanying Signs & Symptoms: denies any UTI symptoms  Previous history of similar problem: yes  Precipitating factors:        Worsened by: unknown  Alleviating factors:        Improved by: nothing  Therapies tried and outcome:   tried a medication in the past-  Worked at first, but then stopped working  Kegels - not effective            Review of Systems   Constitutional, HEENT, cardiovascular, pulmonary, gi " and gu systems are negative, except as otherwise noted.      Objective           Vitals:  No vitals were obtained today due to virtual visit.    Physical Exam   GENERAL: fatigued  EYES: Eyes grossly normal to inspection.  No discharge or erythema, or obvious scleral/conjunctival abnormalities.  RESP: No audible wheeze, cough, or visible cyanosis.  No visible retractions or increased work of breathing.    SKIN: Visible skin clear. No significant rash, abnormal pigmentation or lesions.  NEURO: Cranial nerves grossly intact.  Mentation and speech appropriate for age.  PSYCH: Mentation appears normal, affect normal/bright, judgement and insight intact, normal speech and appearance well-groomed.                Video-Visit Details    Type of service:  Video Visit    Video End Time:3:28 PM    Originating Location (pt. Location): Home    Distant Location (provider location):  [facility]     Platform used for Video Visit: [employer]

## 2021-04-26 NOTE — PATIENT INSTRUCTIONS
Make a lab appointment for urine sample to check for urine infection.      For treatment of incontinence, recommend pelvic floor therapy. Someone from the therapy department will call you to schedule an appointment.

## 2021-05-03 ENCOUNTER — TRANSFERRED RECORDS (OUTPATIENT)
Dept: HEALTH INFORMATION MANAGEMENT | Facility: CLINIC | Age: 36
End: 2021-05-03

## 2021-05-04 ENCOUNTER — TELEPHONE (OUTPATIENT)
Dept: ALLERGY | Facility: CLINIC | Age: 36
End: 2021-05-04

## 2021-05-04 NOTE — TELEPHONE ENCOUNTER
Reason for Call:  Other call back    Detailed comments: patient is calling requesting a call back to discuss starting allergy shots. Please call to discuss. Thank you.    Phone Number Patient can be reached at: Home number on file [phone] (home)    Best Time:     Can we leave a detailed message on this number? YES     Call taken on 5/4/2021 at 7:10 AM by Lissette Rush

## 2021-05-04 NOTE — TELEPHONE ENCOUNTER
Spoke with patient, appointment scheduled for May 6th to discuss restarting allergy injections  Simona Delacruz RN

## 2021-05-04 NOTE — TELEPHONE ENCOUNTER
The patient's last injection was in September 2020.  If she wants to restart allergen immunotherapy, she will need to start from the beginning. I recommend the patient to be seen in the clinic first.    Rudy Shin MD

## 2021-05-04 NOTE — TELEPHONE ENCOUNTER
Patient requesting to restart allergy immunotherapy. Please advise restart dose for immunotherapy as well as duration of time restart dose is valid.    Patient currently has RED vials available on site    Hx of reactions to immunotherapy: YES - Date: 2020  Hx of asthma: NO      Top Dose: 0.2ml-Red vial  Last injection given on 2020    PATIENTS RED VIALS  ON 2021  Please advise if you want office visit prior to restarting injections.    Signature  Simona Delacruz RN

## 2021-05-05 DIAGNOSIS — N30.00 ACUTE CYSTITIS WITHOUT HEMATURIA: Primary | ICD-10-CM

## 2021-05-05 DIAGNOSIS — R32 URINARY INCONTINENCE, UNSPECIFIED TYPE: ICD-10-CM

## 2021-05-05 DIAGNOSIS — R82.90 NONSPECIFIC FINDING ON EXAMINATION OF URINE: Primary | ICD-10-CM

## 2021-05-05 LAB
ALBUMIN UR-MCNC: ABNORMAL MG/DL
APPEARANCE UR: ABNORMAL
BILIRUB UR QL STRIP: NEGATIVE
COLOR UR AUTO: YELLOW
GLUCOSE UR STRIP-MCNC: NEGATIVE MG/DL
HGB UR QL STRIP: ABNORMAL
KETONES UR STRIP-MCNC: NEGATIVE MG/DL
LEUKOCYTE ESTERASE UR QL STRIP: ABNORMAL
NITRATE UR QL: NEGATIVE
NON-SQ EPI CELLS #/AREA URNS LPF: ABNORMAL /LPF
PH UR STRIP: 5.5 PH (ref 5–7)
RBC #/AREA URNS AUTO: ABNORMAL /HPF
SOURCE: ABNORMAL
SP GR UR STRIP: >1.03 (ref 1–1.03)
UROBILINOGEN UR STRIP-ACNC: 0.2 EU/DL (ref 0.2–1)
WBC #/AREA URNS AUTO: ABNORMAL /HPF

## 2021-05-05 PROCEDURE — 87086 URINE CULTURE/COLONY COUNT: CPT | Performed by: NURSE PRACTITIONER

## 2021-05-05 PROCEDURE — 81001 URINALYSIS AUTO W/SCOPE: CPT | Performed by: NURSE PRACTITIONER

## 2021-05-05 RX ORDER — NITROFURANTOIN 25; 75 MG/1; MG/1
100 CAPSULE ORAL 2 TIMES DAILY
Qty: 14 CAPSULE | Refills: 0 | Status: SHIPPED | OUTPATIENT
Start: 2021-05-05 | End: 2021-05-12

## 2021-05-06 LAB
BACTERIA SPEC CULT: NORMAL
SPECIMEN SOURCE: NORMAL

## 2021-05-06 NOTE — PROGRESS NOTES
Maddy is a 35 year old who is being evaluated via a billable video visit.      How would you like to obtain your AVS? MyChart  If the video visit is dropped, the invitation should be resent by: Text to cell phone: [phone]  Will anyone else be joining your video visit? No      Video Start Time: 8:33 AM    Assessment & Plan     Irritable bowel syndrome with constipation  Patient may continue her probiotic as she feels it is helpful      Patient Instructions   PATIENT INSTRUCTIONS    1. I suspect that you have constipation-predominant Irritable Bowel Syndrome (IBS).     The goal of treatment is lessening of symptoms and improving quality of life, and that erradication of IBS symptoms is not likely. IBS is not a disease but rather a collection of symptoms that are suspected to be a result of visceral hypersensitivity, environmental and genetic factors, altered gut motility, miscommunication between the brain and gut, and psychosocial factors. The role of emotional well-being and diet in IBS is essential, in addition to continuing a regular exercise program, eating healthfully, maintaining adequete sleep and stress behavior modification. Treatment of IBS is directing towards your most troublesome symptoms to improve quality of life.     2. Consume at least 8 glasses of water per day    3. Exercise for at least 30 minutes every day. Regular exercise helps to keep your digestive system active and and healthy and may help with constipation.    4. Increase dietary fiber. Goal of 25 grams per day for women, 35 grams per day for men. Fiber in some patients with IBS may increase symptoms of gas and bloating, however, so increase your intake gradually.   If unable to consume 25-35 grams through diet alone consider OTC supplements such as Benefiber, FiberCon, Metamucil, or Citrucel.    5. Recommend taking Miralax (17 grams = 1 scoop). Take once daily, can mix with anything. If you experience increasing bowel habits and  "diarrhea, decrease to every other day or every 3rd day.  Miralax is an osmotic laxative that increases the amount of water secreted by the intestines resulting in softer and easier to pass stools.     6. Also recommend stimulant laxative such as Senna, Ex Lax or Dulcolax. Stimulant laxatives speed up the colonic motility of your gut helping to induce a bowel movement. Take as needed at bedtime.     7. If you are still having difficulties with constipation may also add a stool softener to your bowel regimen such a Docusate.     8. I would also like you to trial a diet specific to IBS, has shown efficacy in decreasing symptoms of abdominal pain, bloating, cramping, and constipation. The FOD-MAP exclusionary diet is encouraged for patient to trial with recommendation of a book by Madiha Salazar entitled \"IBS: Free at Last\".   Found on [employer].com      9. For gas and bloating associated with meals, may consider Simethicone 125 mg after meals four times per day or 150 mg after meals three times per day, not to exceed 500 mg per day.            Return in about 6 months (around 11/7/2021).    The risks, benefits and treatment options of prescribed medications or other treatments have been discussed with the patient. The patient verbalized their understanding and should call or follow up if no improvement or if they develop further problems.    PHILL Luo [facility]      Terry Castañeda is a 35 year old who presents for the following health issues     HPI     Concern - probiotic with IBS symptoms  Onset: chronic,  Has always kind of had this.  Description: patient reports she wants to know if its ok to use a probiotic for symptoms   C/o bloating and gas  Intensity: mild  Progression of Symptoms:  same  Accompanying Signs & Symptoms: constipation   Denies diarrhea  Previous history of similar problem: yes  Precipitating factors:        Worsened by: unknown  Alleviating factors:        " Improved by: nothing  Therapies tried and outcome:  none ;  Eating more fiber makes it worse          Review of Systems   Constitutional, HEENT, cardiovascular, pulmonary, gi and gu systems are negative, except as otherwise noted.      Objective           Vitals:  No vitals were obtained today due to virtual visit.    Physical Exam   GENERAL: Healthy, alert and no distress  EYES: Eyes grossly normal to inspection.  No discharge or erythema, or obvious scleral/conjunctival abnormalities.  RESP: No audible wheeze, cough, or visible cyanosis.  No visible retractions or increased work of breathing.    SKIN: Visible skin clear. No significant rash, abnormal pigmentation or lesions.  NEURO: Cranial nerves grossly intact.  Mentation and speech appropriate for age.  PSYCH: Mentation appears normal, affect normal/bright, judgement and insight intact, normal speech and appearance well-groomed.                Video-Visit Details    Type of service:  Video Visit    Video End Time:8:40 AM    Originating Location (pt. Location): Home    Distant Location (provider location):  [facility]     Platform used for Video Visit: Jessie

## 2021-05-07 ENCOUNTER — VIRTUAL VISIT (OUTPATIENT)
Dept: FAMILY MEDICINE | Facility: CLINIC | Age: 36
End: 2021-05-07
Payer: MEDICARE

## 2021-05-07 DIAGNOSIS — K58.1 IRRITABLE BOWEL SYNDROME WITH CONSTIPATION: Primary | ICD-10-CM

## 2021-05-07 PROCEDURE — 99213 OFFICE O/P EST LOW 20 MIN: CPT | Mod: 95 | Performed by: NURSE PRACTITIONER

## 2021-05-07 NOTE — PATIENT INSTRUCTIONS
PATIENT INSTRUCTIONS    1. I suspect that you have constipation-predominant Irritable Bowel Syndrome (IBS).     The goal of treatment is lessening of symptoms and improving quality of life, and that erradication of IBS symptoms is not likely. IBS is not a disease but rather a collection of symptoms that are suspected to be a result of visceral hypersensitivity, environmental and genetic factors, altered gut motility, miscommunication between the brain and gut, and psychosocial factors. The role of emotional well-being and diet in IBS is essential, in addition to continuing a regular exercise program, eating healthfully, maintaining adequete sleep and stress behavior modification. Treatment of IBS is directing towards your most troublesome symptoms to improve quality of life.     2. Consume at least 8 glasses of water per day    3. Exercise for at least 30 minutes every day. Regular exercise helps to keep your digestive system active and and healthy and may help with constipation.    4. Increase dietary fiber. Goal of 25 grams per day for women, 35 grams per day for men. Fiber in some patients with IBS may increase symptoms of gas and bloating, however, so increase your intake gradually.   If unable to consume 25-35 grams through diet alone consider OTC supplements such as Benefiber, FiberCon, Metamucil, or Citrucel.    5. Recommend taking Miralax (17 grams = 1 scoop). Take once daily, can mix with anything. If you experience increasing bowel habits and diarrhea, decrease to every other day or every 3rd day.  Miralax is an osmotic laxative that increases the amount of water secreted by the intestines resulting in softer and easier to pass stools.     6. Also recommend stimulant laxative such as Senna, Ex Lax or Dulcolax. Stimulant laxatives speed up the colonic motility of your gut helping to induce a bowel movement. Take as needed at bedtime.     7. If you are still having difficulties with constipation may also add  "a stool softener to your bowel regimen such a Docusate.     8. I would also like you to trial a diet specific to IBS, has shown efficacy in decreasing symptoms of abdominal pain, bloating, cramping, and constipation. The FOD-MAP exclusionary diet is encouraged for patient to trial with recommendation of a book by Madiha Salazar entitled \"IBS: Free at Last\".   Found on Amazon.com      9. For gas and bloating associated with meals, may consider Simethicone 125 mg after meals four times per day or 150 mg after meals three times per day, not to exceed 500 mg per day.        "

## 2021-05-10 ENCOUNTER — AMBULATORY - HEALTHEAST (OUTPATIENT)
Dept: FAMILY MEDICINE | Facility: CLINIC | Age: 36
End: 2021-05-10

## 2021-05-10 ENCOUNTER — VIRTUAL VISIT (OUTPATIENT)
Dept: FAMILY MEDICINE | Facility: CLINIC | Age: 36
End: 2021-05-10
Payer: MEDICARE

## 2021-05-10 DIAGNOSIS — R11.11 VOMITING WITHOUT NAUSEA, INTRACTABILITY OF VOMITING NOT SPECIFIED, UNSPECIFIED VOMITING TYPE: ICD-10-CM

## 2021-05-10 DIAGNOSIS — R11.11 VOMITING WITHOUT NAUSEA, INTRACTABILITY OF VOMITING NOT SPECIFIED, UNSPECIFIED VOMITING TYPE: Primary | ICD-10-CM

## 2021-05-10 LAB
SARS-COV-2 PCR COMMENT: NORMAL
SARS-COV-2 RNA SPEC QL NAA+PROBE: NEGATIVE
SARS-COV-2 VIRUS SPECIMEN SOURCE: NORMAL

## 2021-05-10 PROCEDURE — 99441 PR PHYSICIAN TELEPHONE EVALUATION 5-10 MIN: CPT | Mod: 95 | Performed by: NURSE PRACTITIONER

## 2021-05-10 NOTE — PROGRESS NOTES
"Maddy is a 35 year old who is being evaluated via a billable telephone visit.      What phone number would you like to be contacted at? [phone]  -How would you like to obtain your AVS? MyChart    Assessment & Plan     Vomiting without nausea, intractability of vomiting not specified, unspecified vomiting type  One episode of vomiting 2 days ago. Does have mild cough and mild diarrhea, sore throat. Would like COVID testing, this order is placed.  - Symptomatic COVID-19 Virus (Coronavirus) by PCR; Future       BMI:   Estimated body mass index is 42.16 kg/m  as calculated from the following:    Height as of 4/15/21: 1.6 m (5' 3\").    Weight as of 4/15/21: 108 kg (238 lb).       Patient Instructions   COVID test is ordered, someone will call you to schedule this.      No follow-ups on file.    PHILL Martinez [facility]    Subjective   Maddy is a 35 year old who presents for the following health issues    HPI       Concern for COVID-19  About how many days ago did these symptoms start? 3 days  Is this your first visit for this illness? Yes  In the 14 days before your symptoms started, have you had close contact with someone with COVID-19 (Coronavirus)? No  Do you have a fever or chills? No  Are you having new or worsening difficulty breathing? No  Do you have new or worsening cough? Yes, it's a dry cough.   Have you had any new or unexplained body aches? YES    Have you experienced any of the following NEW symptoms?    Headache: No    Sore throat: YES    Loss of taste or smell: No    Chest pain: No    Diarrhea: YES    Rash: No  What treatments have you tried? Probiotic   Who do you live with? Daughter  Are you, or a household member, a healthcare worker or a ? No  Do you live in a nursing home, group home, or shelter? No  Do you have a way to get food/medications if quarantined? Yes instacart               Review of Systems   Constitutional, HEENT, cardiovascular, " pulmonary, gi and gu systems are negative, except as otherwise noted.      Objective           Vitals:  No vitals were obtained today due to virtual visit.    Physical Exam   healthy, alert and no distress  PSYCH: Alert and oriented times 3; coherent speech, normal   rate and volume, able to articulate logical thoughts, able   to abstract reason, no tangential thoughts, no hallucinations   or delusions  Her affect is normal  RESP: No cough, no audible wheezing, able to talk in full sentences  Remainder of exam unable to be completed due to telephone visits                Phone call duration: 5 minutes

## 2021-05-11 ENCOUNTER — COMMUNICATION - HEALTHEAST (OUTPATIENT)
Dept: SCHEDULING | Facility: CLINIC | Age: 36
End: 2021-05-11

## 2021-05-11 ENCOUNTER — COMMUNICATION - HEALTHEAST (OUTPATIENT)
Dept: FAMILY MEDICINE | Facility: CLINIC | Age: 36
End: 2021-05-11

## 2021-05-12 ENCOUNTER — NURSE TRIAGE (OUTPATIENT)
Dept: NURSING | Facility: CLINIC | Age: 36
End: 2021-05-12

## 2021-05-12 ENCOUNTER — VIRTUAL VISIT (OUTPATIENT)
Dept: FAMILY MEDICINE | Facility: CLINIC | Age: 36
End: 2021-05-12
Payer: MEDICARE

## 2021-05-12 ENCOUNTER — COMMUNICATION - HEALTHEAST (OUTPATIENT)
Dept: SCHEDULING | Facility: CLINIC | Age: 36
End: 2021-05-12

## 2021-05-12 ENCOUNTER — OFFICE VISIT (OUTPATIENT)
Dept: ALLERGY | Facility: CLINIC | Age: 36
End: 2021-05-12
Payer: MEDICARE

## 2021-05-12 VITALS
DIASTOLIC BLOOD PRESSURE: 90 MMHG | HEART RATE: 104 BPM | TEMPERATURE: 98.5 F | SYSTOLIC BLOOD PRESSURE: 138 MMHG | BODY MASS INDEX: 42.02 KG/M2 | WEIGHT: 237.22 LBS | OXYGEN SATURATION: 98 %

## 2021-05-12 DIAGNOSIS — R06.09 DYSPNEA ON EXERTION: Primary | ICD-10-CM

## 2021-05-12 DIAGNOSIS — J30.1 CHRONIC SEASONAL ALLERGIC RHINITIS DUE TO POLLEN: ICD-10-CM

## 2021-05-12 DIAGNOSIS — M25.511 ACUTE PAIN OF RIGHT SHOULDER: Primary | ICD-10-CM

## 2021-05-12 PROCEDURE — 99214 OFFICE O/P EST MOD 30 MIN: CPT | Performed by: ALLERGY & IMMUNOLOGY

## 2021-05-12 PROCEDURE — 99213 OFFICE O/P EST LOW 20 MIN: CPT | Mod: 95 | Performed by: NURSE PRACTITIONER

## 2021-05-12 RX ORDER — FLUNISOLIDE 0.25 MG/ML
2 SOLUTION NASAL EVERY 12 HOURS
Qty: 25 ML | Refills: 3 | Status: SHIPPED | OUTPATIENT
Start: 2021-05-12 | End: 2021-05-19

## 2021-05-12 RX ORDER — MOMETASONE FUROATE MONOHYDRATE 50 UG/1
2 SPRAY, METERED NASAL DAILY
Qty: 17 G | Refills: 1 | Status: SHIPPED | OUTPATIENT
Start: 2021-05-12 | End: 2021-05-12

## 2021-05-12 NOTE — LETTER
5/12/2021         RE: Maddy Ortiz  [address]  [facility] [address]        Dear Colleague,    Thank you for referring your patient, Maddy Ortiz, to the [facility]. Please see a copy of my visit note below.    SUBJECTIVE:                                                                   Maddy Ortiz presents today to our Allergy Clinic at [facility] for a follow up visit.  She is a 35 year old female with allergic rhinitis.  Percutaneous skin puncture testing for aeroallergens performed in November 2016 showed sensitivity to dog, cat, dust mite, molds, pollen of trees, grass, and weeds. In spring-summer 2018, she had a buildup using cluster schedule for allergen immunotherapy.  She reached maintenance dose in July 2018.  One episode of anaphylaxis on September 8, 2020, due to allergen immunotherapy, Red 1:1, 0.5mL  Was given epi x 2.  She tolerated a lower dose x 1 after that.     She develops shortness of breath and wheezing when she rides to work at [employer]. She works there 2.5 hours three times a week.  She develops it when she rides the bike on the way there and back. It takes about 15-20 minutes to get there. When she gets to work, she still has shortness of breath, which eventually gets better, but then she needs to ride back. She gets similar symptoms when she climbs the stairs.  The symptoms are not daily but happen often.    No symptoms at night. She tried using albuterol before and after exertion on several occasions but doesn't remember how it made her feel.   In March 2021,  I ordered PFT, but she didn't have time to get it done.    From allergic rhinoconjunctivitis, she is better than in previous years. She doesn't use any nasal sprays. She doesn't take any oral antihistamines.   She has daily mild nasal congestion, itchy and watery eyes. She doesn't think that intranasal fluticasone was as effective as mometasone. The problem is  that mometasone isn't covered by her insurance anymore. Denies frequent sneezing or rhinorrhea.       Patient Active Problem List   Diagnosis     Animal dander allergy     CARDIOVASCULAR SCREENING; LDL GOAL LESS THAN 160     Psychosis (H)     Paranoid type delusional disorder (H)     Bipolar 1 disorder (H)     Insomnia     Depression with anxiety     Seasonal allergic rhinitis due to pollen     Allergic rhinitis due to mold     Allergic rhinitis due to animal dander     Allergic rhinitis due to dust mite     Encounter for IUD insertion     Schizoaffective disorder, bipolar type (H)     Gastroesophageal reflux disease without esophagitis     Paranoia (psychosis) (H)     Obesity (BMI 35.0-39.9) with comorbidity (H)       Past Medical History:   Diagnosis Date     Bipolar 1 disorder (H) 12/6/2012     Depressive disorder      Paranoid type delusional disorder (H) 11/14/2012      *Patient is Adopted       Problem (# of Occurrences) Relation (Name,Age of Onset)    Unknown/Adopted (3) Mother, Father, Other        Past Surgical History:   Procedure Laterality Date     TONSILLECTOMY & ADENOIDECTOMY      as a child     Social History     Socioeconomic History     Marital status: Single     Spouse name: None     Number of children: None     Years of education: None     Highest education level: None   Occupational History     None   Social Needs     Financial resource strain: None     Food insecurity     Worry: None     Inability: None     Transportation needs     Medical: None     Non-medical: None   Tobacco Use     Smoking status: Never Smoker     Smokeless tobacco: Never Used     Tobacco comment: around 2nd hand smoke   Substance and Sexual Activity     Alcohol use: Not Currently     Comment: rare wine ( very rare)     Drug use: No     Sexual activity: Not Currently     Partners: Male     Birth control/protection: I.U.D.   Lifestyle     Physical activity     Days per week: None     Minutes per session: None     Stress: None    Relationships     Social connections     Talks on phone: None     Gets together: None     Attends Voodoo service: None     Active member of club or organization: None     Attends meetings of clubs or organizations: None     Relationship status: None     Intimate partner violence     Fear of current or ex partner: None     Emotionally abused: None     Physically abused: None     Forced sexual activity: None   Other Topics Concern     Parent/sibling w/ CABG, MI or angioplasty before 65F 55M? No     Comment: patient was adopted; unknown family history   Social History Narrative    One child    Education: some college        May 12, 2021    ENVIRONMENTAL HISTORY: [place] The home is heated with a electric furnace. They do not have central air conditioning, does have box air conditioner in the wall and has air purifier. The patient's bedroom is furnished with stuffed animals in bed, carpeting in bedroom and fabric window coverings. No pets. There is history of cockroach or mice infestation. There are no smokers in the house.  The apartment does not have a basement.            Review of Systems   HENT: Positive for congestion. Negative for rhinorrhea and sneezing.    Eyes: Positive for discharge and itching.   Respiratory: Positive for shortness of breath and wheezing. Negative for cough.    Allergic/Immunologic: Positive for environmental allergies.           Current Outpatient Medications:      albuterol (PROAIR HFA/PROVENTIL HFA/VENTOLIN HFA) 108 (90 Base) MCG/ACT inhaler, Inhale 2 puffs into the lungs every 4 hours as needed for wheezing or shortness of breath / dyspnea, Disp: 18 g, Rfl: 3     albuterol (PROAIR HFA/PROVENTIL HFA/VENTOLIN HFA) 108 (90 Base) MCG/ACT inhaler, Inhale 2 puffs into the lungs every 6 hours as needed for shortness of breath / dyspnea or wheezing, Disp: 18 g, Rfl: 0     benztropine (COGENTIN) 0.5 MG tablet, Take 0.5 mg by mouth daily as  needed , Disp: , Rfl:      flunisolide (NASALIDE) 25 MCG/ACT (0.025%) SOLN spray, Spray 2 sprays into both nostrils every 12 hours, Disp: 25 mL, Rfl: 3     levonorgestrel-ethinyl estradiol (AVIANE) 0.1-20 MG-MCG tablet, Take 1 tablet by mouth daily, Disp: 84 tablet, Rfl: 3     Melatonin 10 MG TABS tablet, Take 10 mg by mouth nightly as needed for sleep, Disp: , Rfl:      sertraline (ZOLOFT) 100 MG tablet, Take 200 mg by mouth every evening, Disp: , Rfl:      VRAYLAR 6 MG CAPS capsule, At Bedtime , Disp: , Rfl:      azelastine (ASTELIN) 0.1 % nasal spray, Spray 2 sprays in nostril daily as needed for rhinitis or allergies (Patient not taking: Reported on 5/12/2021), Disp: 30 mL, Rfl: 3     EPINEPHrine (EPIPEN/ADRENACLICK/OR ANY BX GENERIC EQUIV) 0.3 MG/0.3ML injection 2-pack, Inject 0.3 mLs (0.3 mg) into the muscle as needed for anaphylaxis (Patient not taking: Reported on 5/12/2021), Disp: 0.6 mL, Rfl: 3     fluticasone (FLONASE) 50 MCG/ACT nasal spray, Spray 1-2 sprays into both nostrils daily (Patient not taking: Reported on 5/12/2021), Disp: 16 g, Rfl: 3     hydrOXYzine (ATARAX) 10 MG tablet, Take 1 tablet (10 mg) by mouth daily as needed for itching (nasal al,lergy symptoms) (Patient not taking: Reported on 5/12/2021), Disp: 90 tablet, Rfl: 1     ORDER FOR ALLERGEN IMMUNOTHERAPY, Cat Hair, Standardized 10,000 BAU/mL, ALK  3.0 ml Dog Hair Dander, A. P.  1:100 w/v, HS  1.0 ml Dust Mites F 30,000AU/mL, HS  0.5 ml Dust Mites P. 30,000 AU/mL, HS  0.5 ml  Diluent: HSA qs to 5ml, Disp: 5 mL, Rfl: PRN     ORDER FOR ALLERGEN IMMUNOTHERAPY, Alternaria Tenuis 1:10 w/v, HS  0.5 ml Epicoccum Nigrum 1:10 w/v, HS 0.5 ml Hormodendrum Cladosporioides 1:10 w/v, HS 0.5 ml Diluent: HSA qs to 5ml, Disp: 5 mL, Rfl: PRN     ORDER FOR ALLERGEN IMMUNOTHERAPY, Estuardo, White  1:20 w/v, HS  0.5 ml Birch Mix PRW 1:20 w/v, HS  0.5 ml Elm, American 1:20 w/v, HS  0.5 ml Hackberry 1:20 w/v, HS 0.5 ml Hickory, Shagbark 1:20 w/v, HS  0.5 ml Monroe  Mix RW 1:20 w/v, HS 0.5 ml Oak Mix RVW 1:20 w/v, HS 0.5 ml Berger Tree, Black 1:20 w/v, HS 0.5 ml Carth[place], Black 1:20 w/v, HS 0.5 ml Diluent: HSA qs to 5ml, Disp: 5 mL, Rfl: PRN     ORDER FOR ALLERGEN IMMUNOTHERAPY, Kochia 1:20 w/v, HS 1.0 ml Nettle 1:20 w/v, HS 1.0 ml Plantain, English 1:20 w/v, HS 1.0 ml Ragweed Mixed 1:20 w/v ALK  0.6 ml Russian Thistle 1:20 w/v, HS 1.0 ml Diluent: HSA qs to 5ml, Disp: 5 mL, Rfl: PRN     Pseudoephedrine HCl (SUDAFED PO), Take by mouth as needed for congestion, Disp: , Rfl:      terbinafine (LAMISIL AT) 1 % external cream, Apply topically 2 times daily (Patient not taking: Reported on 5/12/2021), Disp: 12 g, Rfl: 1  Immunization History   Administered Date(s) Administered     DTAP (<7y) 03/01/1985, 06/01/1985, 07/01/1985, 07/01/1986, 07/26/1990     HPV Quadrivalent 12/12/2008, 09/06/2011     Hep B, Peds or Adolescent 02/16/1998     Historical DTP/aP 03/01/1985, 06/01/1985, 07/01/1985, 07/01/1986, 07/26/1990     Historical Hepb 02/16/1998     Influenza (IIV3) PF 12/09/2008, 12/06/2012     Influenza Vaccine IM > 6 months Valent IIV4 11/21/2017, 01/03/2019, 09/17/2019, 09/16/2020     MMR 02/27/1986, 07/17/1997     Poliovirus, inactivated (IPV) 06/01/1985, 07/01/1985, 01/01/1987, 07/26/1990     TDAP Vaccine (Adacel) 06/30/1997, 03/31/2008, 05/14/2010, 12/06/2012     Td (Adult), Adsorbed 06/30/1997     Allergies   Allergen Reactions     Animal Dander      Other reaction(s): *Unknown     Metformin Fatigue     Mold      Other reaction(s): Runny Nose     Trees      OBJECTIVE:                                                                 BP (!) 138/90 (BP Location: Left arm, Patient Position: Sitting, Cuff Size: Adult Large)   Pulse 104   Temp 98.5  F (36.9  C) (Tympanic)   Wt 107.6 kg (237 lb 3.4 oz)   LMP 04/21/2021 (Exact Date)   SpO2 98%   BMI 42.02 kg/m          Physical Exam  Vitals signs and nursing note reviewed.   Constitutional:       General: She is not in acute  distress.     Appearance: She is not diaphoretic.   HENT:      Head: Normocephalic and atraumatic.      Right Ear: Tympanic membrane, ear canal and external ear normal.      Left Ear: Tympanic membrane, ear canal and external ear normal.      Nose: Septal deviation (mild, to the right) present. No mucosal edema or rhinorrhea.      Mouth/Throat:      Mouth: Mucous membranes are moist.      Pharynx: Oropharynx is clear. No oropharyngeal exudate.   Eyes:      General:         Right eye: No discharge.         Left eye: No discharge.      Conjunctiva/sclera:      Right eye: Right conjunctiva is not injected.      Left eye: Left conjunctiva is not injected.   Neck:      Musculoskeletal: Normal range of motion.   Cardiovascular:      Rate and Rhythm: Normal rate and regular rhythm.      Heart sounds: Normal heart sounds. No murmur.   Pulmonary:      Effort: Pulmonary effort is normal. No respiratory distress.      Breath sounds: Normal breath sounds and air entry. No decreased breath sounds, wheezing, rhonchi or rales.   Musculoskeletal: Normal range of motion.   Lymphadenopathy:      Cervical: No cervical adenopathy.   Skin:     General: Skin is warm.   Neurological:      Mental Status: She is alert and oriented to person, place, and time.   Psychiatric:         Mood and Affect: Mood normal.         Behavior: Behavior normal.         ASSESSMENT/PLAN:    Dyspnea on exertion  Unfortunately, the patient is not a great historian and she did not schedule pulmonary function test.  -I asked her to pay more attention and may be write it down for the next office visit, how long it takes to develop shortness of breath when she rides to work, the duration, and efficacy of albuterol after using albuterol, both for chest symptoms and preexertionally.  -She will schedule pulmonary function test.        - General PFT Lab (Please always keep checked)  - Pulmonary Function Test    Chronic seasonal allergic rhinitis due to pollen  Has mild  symptoms that is significantly better compared with previous years, before allergen immunotherapy.  Mometasone is not covered by her insurance.  Intranasal fluticasone was not very effective.  -Start flunisolide 2 sprays in each nostril twice daily.  -Take cetirizine 10 mg by mouth once daily as needed.    - flunisolide (NASALIDE) 25 MCG/ACT (0.025%) SOLN spray  Dispense: 25 mL; Refill: 3       Return in about 4 weeks (around 6/9/2021), or if symptoms worsen or fail to improve.    Thank you for allowing us to participate in the care of this patient. Please feel free to contact us if there are any questions or concerns about the patient.    Disclaimer: This note consists of symbols derived from keyboarding, dictation and/or voice recognition software. As a result, there may be errors in the script that have gone undetected. Please consider this when interpreting information found in this chart.    Rudy Shin MD, FAAJREICAI, FACJERICAI  Allergy, Asthma and Immunology    [facility]         Again, thank you for allowing me to participate in the care of your patient.        Sincerely,        Rudy Shin MD

## 2021-05-12 NOTE — PROGRESS NOTES
SUBJECTIVE:                                                                   Maddy Ortiz presents today to our Allergy Clinic at [facility] for a follow up visit.  She is a 35 year old female with allergic rhinitis.  Percutaneous skin puncture testing for aeroallergens performed in November 2016 showed sensitivity to dog, cat, dust mite, molds, pollen of trees, grass, and weeds. In spring-summer 2018, she had a buildup using cluster schedule for allergen immunotherapy.  She reached maintenance dose in July 2018.  One episode of anaphylaxis on September 8, 2020, due to allergen immunotherapy, Red 1:1, 0.5mL  Was given epi x 2.  She tolerated a lower dose x 1 after that.     She develops shortness of breath and wheezing when she rides to work at [employer]. She works there 2.5 hours three times a week.  She develops it when she rides the bike on the way there and back. It takes about 15-20 minutes to get there. When she gets to work, she still has shortness of breath, which eventually gets better, but then she needs to ride back. She gets similar symptoms when she climbs the stairs.  The symptoms are not daily but happen often.    No symptoms at night. She tried using albuterol before and after exertion on several occasions but doesn't remember how it made her feel.   In March 2021,  I ordered PFT, but she didn't have time to get it done.    From allergic rhinoconjunctivitis, she is better than in previous years. She doesn't use any nasal sprays. She doesn't take any oral antihistamines.   She has daily mild nasal congestion, itchy and watery eyes. She doesn't think that intranasal fluticasone was as effective as mometasone. The problem is that mometasone isn't covered by her insurance anymore. Denies frequent sneezing or rhinorrhea.       Patient Active Problem List   Diagnosis     Animal dander allergy     CARDIOVASCULAR SCREENING; LDL GOAL LESS THAN 160     Psychosis (H)     Paranoid type  delusional disorder (H)     Bipolar 1 disorder (H)     Insomnia     Depression with anxiety     Seasonal allergic rhinitis due to pollen     Allergic rhinitis due to mold     Allergic rhinitis due to animal dander     Allergic rhinitis due to dust mite     Encounter for IUD insertion     Schizoaffective disorder, bipolar type (H)     Gastroesophageal reflux disease without esophagitis     Paranoia (psychosis) (H)     Obesity (BMI 35.0-39.9) with comorbidity (H)       Past Medical History:   Diagnosis Date     Bipolar 1 disorder (H) 12/6/2012     Depressive disorder      Paranoid type delusional disorder (H) 11/14/2012      *Patient is Adopted       Problem (# of Occurrences) Relation (Name,Age of Onset)    Unknown/Adopted (3) Mother, Father, Other        Past Surgical History:   Procedure Laterality Date     TONSILLECTOMY & ADENOIDECTOMY      as a child     Social History     Socioeconomic History     Marital status: Single     Spouse name: None     Number of children: None     Years of education: None     Highest education level: None   Occupational History     None   Social Needs     Financial resource strain: None     Food insecurity     Worry: None     Inability: None     Transportation needs     Medical: None     Non-medical: None   Tobacco Use     Smoking status: Never Smoker     Smokeless tobacco: Never Used     Tobacco comment: around 2nd hand smoke   Substance and Sexual Activity     Alcohol use: Not Currently     Comment: rare wine ( very rare)     Drug use: No     Sexual activity: Not Currently     Partners: Male     Birth control/protection: I.U.D.   Lifestyle     Physical activity     Days per week: None     Minutes per session: None     Stress: None   Relationships     Social connections     Talks on phone: None     Gets together: None     Attends Sabianist service: None     Active member of club or organization: None     Attends meetings of clubs or organizations: None     Relationship status: None      Intimate partner violence     Fear of current or ex partner: None     Emotionally abused: None     Physically abused: None     Forced sexual activity: None   Other Topics Concern     Parent/sibling w/ CABG, MI or angioplasty before 65F 55M? No     Comment: patient was adopted; unknown family history   Social History Narrative    One child    Education: some college        May 12, 2021    ENVIRONMENTAL HISTORY: [place] The home is heated with a electric furnace. They do not have central air conditioning, does have box air conditioner in the wall and has air purifier. The patient's bedroom is furnished with stuffed animals in bed, carpeting in bedroom and fabric window coverings. No pets. There is history of cockroach or mice infestation. There are no smokers in the house.  The apartment does not have a basement.            Review of Systems   HENT: Positive for congestion. Negative for rhinorrhea and sneezing.    Eyes: Positive for discharge and itching.   Respiratory: Positive for shortness of breath and wheezing. Negative for cough.    Allergic/Immunologic: Positive for environmental allergies.           Current Outpatient Medications:      albuterol (PROAIR HFA/PROVENTIL HFA/VENTOLIN HFA) 108 (90 Base) MCG/ACT inhaler, Inhale 2 puffs into the lungs every 4 hours as needed for wheezing or shortness of breath / dyspnea, Disp: 18 g, Rfl: 3     albuterol (PROAIR HFA/PROVENTIL HFA/VENTOLIN HFA) 108 (90 Base) MCG/ACT inhaler, Inhale 2 puffs into the lungs every 6 hours as needed for shortness of breath / dyspnea or wheezing, Disp: 18 g, Rfl: 0     benztropine (COGENTIN) 0.5 MG tablet, Take 0.5 mg by mouth daily as needed , Disp: , Rfl:      flunisolide (NASALIDE) 25 MCG/ACT (0.025%) SOLN spray, Spray 2 sprays into both nostrils every 12 hours, Disp: 25 mL, Rfl: 3     levonorgestrel-ethinyl estradiol (AVIANE) 0.1-20 MG-MCG tablet, Take 1 tablet by mouth daily,  Disp: 84 tablet, Rfl: 3     Melatonin 10 MG TABS tablet, Take 10 mg by mouth nightly as needed for sleep, Disp: , Rfl:      sertraline (ZOLOFT) 100 MG tablet, Take 200 mg by mouth every evening, Disp: , Rfl:      VRAYLAR 6 MG CAPS capsule, At Bedtime , Disp: , Rfl:      azelastine (ASTELIN) 0.1 % nasal spray, Spray 2 sprays in nostril daily as needed for rhinitis or allergies (Patient not taking: Reported on 5/12/2021), Disp: 30 mL, Rfl: 3     EPINEPHrine (EPIPEN/ADRENACLICK/OR ANY BX GENERIC EQUIV) 0.3 MG/0.3ML injection 2-pack, Inject 0.3 mLs (0.3 mg) into the muscle as needed for anaphylaxis (Patient not taking: Reported on 5/12/2021), Disp: 0.6 mL, Rfl: 3     fluticasone (FLONASE) 50 MCG/ACT nasal spray, Spray 1-2 sprays into both nostrils daily (Patient not taking: Reported on 5/12/2021), Disp: 16 g, Rfl: 3     hydrOXYzine (ATARAX) 10 MG tablet, Take 1 tablet (10 mg) by mouth daily as needed for itching (nasal al,lergy symptoms) (Patient not taking: Reported on 5/12/2021), Disp: 90 tablet, Rfl: 1     ORDER FOR ALLERGEN IMMUNOTHERAPY, Cat Hair, Standardized 10,000 BAU/mL, ALK  3.0 ml Dog Hair Dander, A. P.  1:100 w/v, HS  1.0 ml Dust Mites F 30,000AU/mL, HS  0.5 ml Dust Mites P. 30,000 AU/mL, HS  0.5 ml  Diluent: HSA qs to 5ml, Disp: 5 mL, Rfl: PRN     ORDER FOR ALLERGEN IMMUNOTHERAPY, Alternaria Tenuis 1:10 w/v, HS  0.5 ml Epicoccum Nigrum 1:10 w/v, HS 0.5 ml Hormodendrum Cladosporioides 1:10 w/v, HS 0.5 ml Diluent: HSA qs to 5ml, Disp: 5 mL, Rfl: PRN     ORDER FOR ALLERGEN IMMUNOTHERAPY, Estuardo, White  1:20 w/v, HS  0.5 ml Birch Mix PRW 1:20 w/v, HS  0.5 ml Elm, American 1:20 w/v, HS  0.5 ml Hackberry 1:20 w/v, HS 0.5 ml Hickory, Shagbark 1:20 w/v, HS  0.5 ml Loving Mix RW 1:20 w/v, HS 0.5 ml Oak Mix RVW 1:20 w/v, HS 0.5 ml [place] Tree, Black 1:20 w/v, HS 0.5 ml Tombstone, Black 1:20 w/v, HS 0.5 ml Diluent: HSA qs to 5ml, Disp: 5 mL, Rfl: PRN     ORDER FOR ALLERGEN IMMUNOTHERAPY, Kochia 1:20 w/v, HS 1.0 ml Nettle  1:20 w/v, HS 1.0 ml Plantain, English 1:20 w/v, HS 1.0 ml Ragweed Mixed 1:20 w/v ALK  0.6 ml Russian Thistle 1:20 w/v, HS 1.0 ml Diluent: HSA qs to 5ml, Disp: 5 mL, Rfl: PRN     Pseudoephedrine HCl (SUDAFED PO), Take by mouth as needed for congestion, Disp: , Rfl:      terbinafine (LAMISIL AT) 1 % external cream, Apply topically 2 times daily (Patient not taking: Reported on 5/12/2021), Disp: 12 g, Rfl: 1  Immunization History   Administered Date(s) Administered     DTAP (<7y) 03/01/1985, 06/01/1985, 07/01/1985, 07/01/1986, 07/26/1990     HPV Quadrivalent 12/12/2008, 09/06/2011     Hep B, Peds or Adolescent 02/16/1998     Historical DTP/aP 03/01/1985, 06/01/1985, 07/01/1985, 07/01/1986, 07/26/1990     Historical Hepb 02/16/1998     Influenza (IIV3) PF 12/09/2008, 12/06/2012     Influenza Vaccine IM > 6 months Valent IIV4 11/21/2017, 01/03/2019, 09/17/2019, 09/16/2020     MMR 02/27/1986, 07/17/1997     Poliovirus, inactivated (IPV) 06/01/1985, 07/01/1985, 01/01/1987, 07/26/1990     TDAP Vaccine (Adacel) 06/30/1997, 03/31/2008, 05/14/2010, 12/06/2012     Td (Adult), Adsorbed 06/30/1997     Allergies   Allergen Reactions     Animal Dander      Other reaction(s): *Unknown     Metformin Fatigue     Mold      Other reaction(s): Runny Nose     Trees      OBJECTIVE:                                                                 BP (!) 138/90 (BP Location: Left arm, Patient Position: Sitting, Cuff Size: Adult Large)   Pulse 104   Temp 98.5  F (36.9  C) (Tympanic)   Wt 107.6 kg (237 lb 3.4 oz)   LMP 04/21/2021 (Exact Date)   SpO2 98%   BMI 42.02 kg/m          Physical Exam  Vitals signs and nursing note reviewed.   Constitutional:       General: She is not in acute distress.     Appearance: She is not diaphoretic.   HENT:      Head: Normocephalic and atraumatic.      Right Ear: Tympanic membrane, ear canal and external ear normal.      Left Ear: Tympanic membrane, ear canal and external ear normal.      Nose: Septal  deviation (mild, to the right) present. No mucosal edema or rhinorrhea.      Mouth/Throat:      Mouth: Mucous membranes are moist.      Pharynx: Oropharynx is clear. No oropharyngeal exudate.   Eyes:      General:         Right eye: No discharge.         Left eye: No discharge.      Conjunctiva/sclera:      Right eye: Right conjunctiva is not injected.      Left eye: Left conjunctiva is not injected.   Neck:      Musculoskeletal: Normal range of motion.   Cardiovascular:      Rate and Rhythm: Normal rate and regular rhythm.      Heart sounds: Normal heart sounds. No murmur.   Pulmonary:      Effort: Pulmonary effort is normal. No respiratory distress.      Breath sounds: Normal breath sounds and air entry. No decreased breath sounds, wheezing, rhonchi or rales.   Musculoskeletal: Normal range of motion.   Lymphadenopathy:      Cervical: No cervical adenopathy.   Skin:     General: Skin is warm.   Neurological:      Mental Status: She is alert and oriented to person, place, and time.   Psychiatric:         Mood and Affect: Mood normal.         Behavior: Behavior normal.         ASSESSMENT/PLAN:    Dyspnea on exertion  Unfortunately, the patient is not a great historian and she did not schedule pulmonary function test.  -I asked her to pay more attention and may be write it down for the next office visit, how long it takes to develop shortness of breath when she rides to work, the duration, and efficacy of albuterol after using albuterol, both for chest symptoms and preexertionally.  -She will schedule pulmonary function test.        - General PFT Lab (Please always keep checked)  - Pulmonary Function Test    Chronic seasonal allergic rhinitis due to pollen  Has mild symptoms that is significantly better compared with previous years, before allergen immunotherapy.  Mometasone is not covered by her insurance.  Intranasal fluticasone was not very effective.  -Start flunisolide 2 sprays in each nostril twice  daily.  -Take cetirizine 10 mg by mouth once daily as needed.    - flunisolide (NASALIDE) 25 MCG/ACT (0.025%) SOLN spray  Dispense: 25 mL; Refill: 3       Return in about 4 weeks (around 6/9/2021), or if symptoms worsen or fail to improve.    Thank you for allowing us to participate in the care of this patient. Please feel free to contact us if there are any questions or concerns about the patient.    Disclaimer: This note consists of symbols derived from keyboarding, dictation and/or voice recognition software. As a result, there may be errors in the script that have gone undetected. Please consider this when interpreting information found in this chart.    Rudy Shin MD, FAAAAI, FACAAI  Allergy, Asthma and Immunology    [facility]

## 2021-05-12 NOTE — PATIENT INSTRUCTIONS
Start flunisolide 2 sprays in each nostril twice daily.  Take cetirizine 10 mg by mouth once daily as needed.       Call to schedule pulmonary function test:    [phone]    See how long it takes you to develop shortness of breath when you ride to work. See how long it takes for symptoms to get better when you are at work.     See if using albuterol for shortness of breath is helpful. See if using albuterol 2 puffs 15 minutes before riding the bike, improves or or prevent shortness of breath.

## 2021-05-12 NOTE — PROGRESS NOTES
Maddy is a 35 year old who is being evaluated via a billable video visit.      How would you like to obtain your AVS? MyChart  If the video visit is dropped, the invitation should be resent by: Text to cell phone: [phone]  Will anyone else be joining your video visit? No      Video Start Time: 10:12 AM    Assessment & Plan     Acute pain of right shoulder  Recommended PT  - PHYSICAL THERAPY REFERRAL; Future    Follow up in one month if no improvement.    The risks, benefits and treatment options of prescribed medications or other treatments have been discussed with the patient. The patient verbalized their understanding and should call or follow up if no improvement or if they develop further problems.    PHILL Luo CNP  [facility]        Subjective   Maddy is a 35 year old who presents for the following health issues     HPI     Musculoskeletal problem/pain  Onset/Duration: one week  Description  Location: shoulder and neck pain on right side - all the way down to hand  Dull ache  Joint Swelling: no  Redness: no  Pain: YES  Warmth: no  Intensity:  moderate  Progression of Symptoms:  same  Accompanying signs and symptoms:   Fevers: no  Numbness/tingling/weakness: YES- kind of numb feeling  History  Trauma to the area: no  May have slept kind of strange  Recent illness:  no  Previous similar problem: no  Previous evaluation:  no  Precipitating or alleviating factors:  Aggravating factors include: laying on her side  Therapies tried and outcome: stretching; using , exercising; pain medication  Saw a chiropractor and was told that her shoulder and hip was rotating                Review of Systems   Constitutional, HEENT, cardiovascular, pulmonary, gi and gu systems are negative, except as otherwise noted.      Objective           Vitals:  No vitals were obtained today due to virtual visit.    Physical Exam   GENERAL: Healthy, alert and no distress  EYES: Eyes grossly  normal to inspection.  No discharge or erythema, or obvious scleral/conjunctival abnormalities.  RESP: No audible wheeze, cough, or visible cyanosis.  No visible retractions or increased work of breathing.    SKIN: Visible skin clear. No significant rash, abnormal pigmentation or lesions.  NEURO: Cranial nerves grossly intact.  Mentation and speech appropriate for age.  PSYCH: Mentation appears normal, affect normal/bright, judgement and insight intact, normal speech and appearance well-groomed.  MS: exam limited as this was a video visit. ROM in neck was normal, although she reported pain with movements to the right. Shoulder: normal extension.                Video-Visit Details    Type of service:  Video Visit    Video End Time:10:19 AM    Originating Location (pt. Location): Home    Distant Location (provider location):  [facility]     Platform used for Video Visit: CNS Response

## 2021-05-13 ASSESSMENT — ENCOUNTER SYMPTOMS
EYE DISCHARGE: 1
EYE ITCHING: 1
SHORTNESS OF BREATH: 1
RHINORRHEA: 0
COUGH: 0
WHEEZING: 1

## 2021-05-13 NOTE — TELEPHONE ENCOUNTER
Forgot to  prescription at the [place] pharmacy today. Patient wants to know if the pharmacy can mail the prescription to her. Advised patient to call the pharmacy and ask. Gave patient the direct phone number to the [place] pharmacy. Patient had no further questions.     Temi Dunaway, RN/[facility]          Additional Information    Negative: [1] Caller is not with the adult (patient) AND [2] reporting urgent symptoms    Negative: Lab result questions    Negative: Medication questions    Negative: Caller can't be reached by phone    Negative: Caller has already spoken to PCP or another triager    Negative: RN needs further essential information from caller in order to complete triage    Negative: Requesting regular office appointment    Negative: [1] Caller requesting NON-URGENT health information AND [2] PCP's office is the best resource    Negative: Health Information question, no triage required and triager able to answer question    General information question, no triage required and triager able to answer question    Protocols used: INFORMATION ONLY CALL-A-

## 2021-05-17 NOTE — PROGRESS NOTES
"   SUBJECTIVE:   CC: Maddy Ortiz is an 35 year old woman who presents for preventive health visit.       Patient has been advised of split billing requirements and indicates understanding: Yes  Healthy Habits:    In general, how would you rate your overall health?  Fair    Frequency of exercise:  6-7 days/week    Duration of exercise:  45-60 minutes    Do you usually eat at least 4 servings of fruit and vegetables a day, include whole grains    & fiber and avoid regularly eating high fat or \"junk\" foods?  No    Taking medications regularly:  Yes    Barriers to taking medications:  None    Medication side effects:  None    Ability to successfully perform activities of daily living:  Housework requires assistance, money management requires assistance and shopping requires assistance    Home Safety:  No safety concerns identified    Hearing Impairment:  No hearing concerns    In the past 6 months, have you been bothered by leaking of urine? Yes    In general, how would you rate your overall mental or emotional health?  Good      PHQ-2 Total Score:    Additional concerns today:  No        Today's PHQ-2 Score:   PHQ-2 ( 1999 Pf[employer]) 5/19/2021   Q1: Little interest or pleasure in doing things 0   Q2: Feeling down, depressed or hopeless 1   PHQ-2 Score 1       Abuse: Current or Past (Physical, Sexual or Emotional) - No  Do you feel safe in your environment? Yes        Social History     Tobacco Use     Smoking status: Never Smoker     Smokeless tobacco: Never Used     Tobacco comment: around 2nd hand smoke   Substance Use Topics     Alcohol use: Not Currently     Comment: rare wine ( very rare)     If you drink alcohol do you typically have >3 drinks per day or >7 drinks per week? No    Alcohol Use 5/19/2021   Prescreen: >3 drinks/day or >7 drinks/week? No       Reviewed orders with patient.  Reviewed health maintenance and updated orders accordingly - Yes      Breast Cancer Screening:  Any new diagnosis of family " "breast, ovarian, or bowel cancer? No  Family history unknown- patient adopted    Patient under 40 years of age: Routine Mammogram Screening not recommended.   Pertinent mammograms are reviewed under the imaging tab.    History of abnormal Pap smear: NO - age 30-65 PAP every 5 years with negative HPV co-testing recommended  PAP / HPV 8/12/2015 9/21/2012   PAP NIL NIL     Reviewed and updated as needed this visit by clinical staff  Tobacco  Allergies  Meds   Med Hx  Surg Hx  Fam Hx  Soc Hx        Reviewed and updated as needed this visit by Provider                    Review of Systems  CONSTITUTIONAL: NEGATIVE for fever, chills, change in weight  INTEGUMENTARU/SKIN: NEGATIVE for worrisome rashes, moles or lesions  EYES: NEGATIVE for vision changes or irritation  ENT: NEGATIVE for ear, mouth and throat problems  RESP: NEGATIVE for significant cough or SOB  BREAST: NEGATIVE for masses, tenderness or discharge  CV: NEGATIVE for chest pain, palpitations or peripheral edema  GI: NEGATIVE for nausea, abdominal pain, heartburn, or change in bowel habits  : NEGATIVE for unusual urinary or vaginal symptoms. Periods are regular.  MUSCULOSKELETAL: NEGATIVE for significant arthralgias or myalgia  NEURO: NEGATIVE for weakness, dizziness or paresthesias  PSYCHIATRIC: NEGATIVE for changes in mood or affect     OBJECTIVE:   /88   Pulse 104   Temp 98.3  F (36.8  C) (Tympanic)   Ht 1.626 m (5' 4\")   Wt 104.3 kg (230 lb)   LMP 04/21/2021 (Exact Date)   SpO2 98%   BMI 39.48 kg/m    Physical Exam  GENERAL: healthy, alert and no distress  EYES: Eyes grossly normal to inspection, PERRL and conjunctivae and sclerae normal  HENT: ear canals and TM's normal, nose and mouth without ulcers or lesions  NECK: no adenopathy, no asymmetry, masses, or scars and thyroid normal to palpation  RESP: lungs clear to auscultation - no rales, rhonchi or wheezes  CV: regular rate and rhythm, normal S1 S2, no S3 or S4, no murmur, click or " "rub, no peripheral edema and peripheral pulses strong  ABDOMEN: soft, nontender, no hepatosplenomegaly, no masses and bowel sounds normal   (female): normal female external genitalia, normal urethral meatus, vaginal mucosa pink, moist, well rugated, and normal cervix/adnexa/uterus without masses or discharge  MS: no gross musculoskeletal defects noted, no edema  SKIN: no suspicious lesions or rashes  NEURO: Normal strength and tone, mentation intact and speech normal  PSYCH: mentation appears normal, affect normal/bright        ASSESSMENT/PLAN:       ICD-10-CM    1. Encounter for Medicare annual wellness exam  Z00.00    2. Cervical cancer screening  Z12.4 Pap imaged thin layer screen with HPV - recommended age 30 - 65 years (select HPV order below)     HPV High Risk Types DNA Cervical   3. Screen for STD (sexually transmitted disease)  Z11.3 NEISSERIA GONORRHOEA PCR     CHLAMYDIA TRACHOMATIS PCR       Patient has been advised of split billing requirements and indicates understanding: Yes       COUNSELING:  Reviewed preventive health counseling, as reflected in patient instructions    Estimated body mass index is 39.48 kg/m  as calculated from the following:    Height as of this encounter: 1.626 m (5' 4\").    Weight as of this encounter: 104.3 kg (230 lb).    Weight management plan: Discussed healthy diet and exercise guidelines    She reports that she has never smoked. She has never used smokeless tobacco.      The risks, benefits and treatment options of prescribed medications or other treatments have been discussed with the patient. The patient verbalized their understanding and should call or follow up if no improvement or if they develop further problems.    PHILL Luo [facility]          The patient was provided with suggestions to help her develop a healthy physical lifestyle.  The patient was counseled and encouraged to consider modifying their diet and eating habits. " She was provided with information on recommended healthy diet options.  The patient reports that she has difficulty with activities of daily living. I have asked that the patient make a follow up appointment in 12 weeks where this issue will be further evaluated and addressed.  Information on urinary incontinence and treatment options given to patient.

## 2021-05-18 ENCOUNTER — TRANSFERRED RECORDS (OUTPATIENT)
Dept: HEALTH INFORMATION MANAGEMENT | Facility: CLINIC | Age: 36
End: 2021-05-18

## 2021-05-19 ENCOUNTER — OFFICE VISIT (OUTPATIENT)
Dept: FAMILY MEDICINE | Facility: CLINIC | Age: 36
End: 2021-05-19
Payer: MEDICARE

## 2021-05-19 VITALS
HEIGHT: 64 IN | WEIGHT: 230 LBS | OXYGEN SATURATION: 98 % | TEMPERATURE: 98.3 F | BODY MASS INDEX: 39.27 KG/M2 | SYSTOLIC BLOOD PRESSURE: 136 MMHG | HEART RATE: 104 BPM | DIASTOLIC BLOOD PRESSURE: 88 MMHG

## 2021-05-19 DIAGNOSIS — Z12.4 CERVICAL CANCER SCREENING: ICD-10-CM

## 2021-05-19 DIAGNOSIS — Z11.3 SCREEN FOR STD (SEXUALLY TRANSMITTED DISEASE): ICD-10-CM

## 2021-05-19 DIAGNOSIS — Z00.00 ENCOUNTER FOR MEDICARE ANNUAL WELLNESS EXAM: Primary | ICD-10-CM

## 2021-05-19 PROCEDURE — 87491 CHLMYD TRACH DNA AMP PROBE: CPT | Performed by: NURSE PRACTITIONER

## 2021-05-19 PROCEDURE — G0145 SCR C/V CYTO,THINLAYER,RESCR: HCPCS | Performed by: NURSE PRACTITIONER

## 2021-05-19 PROCEDURE — 99395 PREV VISIT EST AGE 18-39: CPT | Performed by: NURSE PRACTITIONER

## 2021-05-19 PROCEDURE — 87624 HPV HI-RISK TYP POOLED RSLT: CPT | Performed by: NURSE PRACTITIONER

## 2021-05-19 PROCEDURE — 87591 N.GONORRHOEAE DNA AMP PROB: CPT | Performed by: NURSE PRACTITIONER

## 2021-05-19 ASSESSMENT — MIFFLIN-ST. JEOR: SCORE: 1718.27

## 2021-05-19 ASSESSMENT — ACTIVITIES OF DAILY LIVING (ADL)
CURRENT_FUNCTION: MONEY MANAGEMENT REQUIRES ASSISTANCE
CURRENT_FUNCTION: SHOPPING REQUIRES ASSISTANCE
CURRENT_FUNCTION: HOUSEWORK REQUIRES ASSISTANCE

## 2021-05-19 NOTE — PATIENT INSTRUCTIONS
Patient Education   Personalized Prevention Plan  You are due for the preventive services outlined below.  Your care team is available to assist you in scheduling these services.  If you have already completed any of these items, please share that information with your care team to update in your medical record.  Health Maintenance Due   Topic Date Due     ANNUAL REVIEW OF HM ORDERS  Never done     COVID-19 Vaccine (1) Never done     Hepatitis B Vaccine (2 of 3 - 3-dose primary series) 03/16/1998     PAP Smear  03/27/2021     Your Health Risk Assessment indicates you feel you are not in good health    A healthy lifestyle helps keep the body fit and the mind alert. It helps protect you from disease, helps you fight disease, and helps prevent chronic disease (disease that doesn't go away) from getting worse. This is important as you get older and begin to notice twinges in muscles and joints and a decline in the strength and stamina you once took for granted. A healthy lifestyle includes good healthcare, good nutrition, weight control, recreation, and regular exercise. Avoid harmful substances and do what you can to keep safe. Another part of a healthy lifestyle is stay mentally active and socially involved.    Good healthcare     Have a wellness visit every year.     If you have new symptoms, let us know right away. Don't wait until the next checkup.     Take medicines exactly as prescribed and keep your medicines in a safe place. Tell us if your medicine causes problems.   Healthy diet and weight control     Eat 3 or 4 small, nutritious, low-fat, high-fiber meals a day. Include a variety of fruits, vegetables, and whole-grain foods.     Make sure you get enough calcium in your diet. Calcium, vitamin D, and exercise help prevent osteoporosis (bone thinning).     If you live alone, try eating with others when you can. That way you get a good meal and have company while you eat it.     Try to keep a healthy weight.  If you eat more calories than your body uses for energy, it will be stored as fat and you will gain weight.     Recreation   Recreation is not limited to sports and team events. It includes any activity that provides relaxation, interest, enjoyment, and exercise. Recreation provides an outlet for physical, mental, and social energy. It can give a sense of worth and achievement. It can help you stay healthy.    Mental Exercise and Social Involvement  Mental and emotional health is as important as physical health. Keep in touch with friends and family. Stay as active as possible. Continue to learn and challenge yourself.   Things you can do to stay mentally active are:    Learn something new, like a foreign language or musical instrument.     Play SCRABBLE or do crossword puzzles. If you cannot find people to play these games with you at home, you can play them with others on your computer through the Internet.     Join a games club--anything from card games to chess or checkers or lawn bowling.     Start a new hobby.     Go back to school.     Volunteer.     Read.   Keep up with world events.    Understanding USDA MyPlate  The USDA has guidelines to help you make healthy food choices. These are called MyPlate. MyPlate shows the food groups that make up healthy meals using the image of a place setting. Before you eat, think about the healthiest choices for what to put on your plate or in your cup or bowl. To learn more about building a healthy plate, visit www.choosemyplate.gov.    The food groups    Fruits. Any fruit or 100% fruit juice counts as part of the Fruit Group. Fruits may be fresh, canned, frozen, or dried, and may be whole, cut-up, or pureed. Make 1/2 of your plate fruits and vegetables.    Vegetables. Any vegetable or 100% vegetable juice counts as a member of the Vegetable Group. Vegetables may be fresh, frozen, canned, or dried. They can be served raw or cooked and may be whole, cut-up, or mashed. Make  1/2 of your plate fruits and vegetables.    Grains. All foods made from grains are part of the Grains Group. These include wheat, rice, oats, cornmeal, and barley. Grains are often used to make foods such as bread, pasta, oatmeal, cereal, tortillas, and grits. Grains should be no more than 1/4 of your plate. At least half of your grains should be whole grains.    Protein. This group includes meat, poultry, seafood, beans and peas, eggs, processed soy products (such as tofu), nuts (including nut butters), and seeds. Make protein choices no more than 1/4 of your plate. Meat and poultry choices should be lean or low fat.    Dairy. The Dairy Group includes all fluid milk products and foods made from milk that contain calcium, such as yogurt and cheese. (Foods that have little calcium, such as cream, butter, and cream cheese, are not part of this group.) Most dairy choices should be low-fat or fat-free.    Oils. Oils aren't a food group, but they do contain essential nutrients. However it's important to watch your intake of oils. These are fats that are liquid at room temperature. They include canola, corn, olive, soybean, vegetable, and sunflower oil. Foods that are mainly oil include mayonnaise, certain salad dressings, and soft margarines. You likely already get your daily oil allowance from the foods you eat.  Things to limit  Eating healthy also means limiting these things in your diet:       Salt (sodium). Many processed foods have a lot of sodium. To keep sodium intake down, eat fresh vegetables, meats, poultry, and seafood when possible. Purchase low-sodium, reduced-sodium, or no-salt-added food products at the store. And don't add salt to your meals at home. Instead, season them with herbs and spices such as dill, oregano, cumin, and paprika. Or try adding flavor with lemon or lime zest and juice.    Saturated fat. Saturated fats are most often found in animal products such as beef, pork, and chicken. They are  often solid at room temperature, such as butter. To reduce your saturated fat intake, choose leaner cuts of meat and poultry. And try healthier cooking methods such as grilling, broiling, roasting, or baking. For a simple lower-fat swap, use plain nonfat yogurt instead of mayonnaise when making potato salad or macaroni salad.    Added sugars. These are sugars added to foods. They are in foods such as ice cream, candy, soda, fruit drinks, sports drinks, energy drinks, cookies, pastries, jams, and syrups. Cut down on added sugars by sharing sweet treats with a family member or friend. You can also choose fruit for dessert, and drink water or other unsweetened beverages.     [employer] last reviewed this educational content on 6/1/2020 2000-2021 [employer]. All rights reserved. This information is not intended as a substitute for professional medical care. Always follow your healthcare professional's instructions.        Activities of Daily Living    Your Health Risk Assessment indicates you have difficulties with activities of daily living such as housework, bathing, preparing meals, taking medication, etc. Please make a follow up appointment for us to address this issue in more detail.    Urinary Incontinence, Female (Adult)   Urinary incontinence means loss of bladder control. This problem affects many women, especially as they get older. If you have incontinence, you may be embarrassed to ask for help. But know that this problem can be treated.   Types of Incontinence  There are different types of incontinence. Two of the main types are described here. You can have more than one type.     Stress incontinence. With this type, urine leaks when pressure (stress) is put on the bladder. This may happen when you cough, sneeze, or laugh. Stress incontinence most often occurs because the pelvic floor muscles that support the bladder and urethra are weak. This can happen after pregnancy and vaginal  childbirth or a hysterectomy. It can also be due to excess body weight or hormone changes.    Urge incontinence (also called overactive bladder). With this type, a sudden urge to urinate is felt often. This may happen even though there may not be much urine in the bladder. The need to urinate often during the night is common. Urge incontinence most often occurs because of bladder spasms. This may be due to bladder irritation or infection. Damage to bladder nerves or pelvic muscles, constipation, and certain medicines can also lead to urge incontinence.  Treatment depends on the cause. Further evaluation is needed to find the type you have. This will likely include an exam and certain tests. Based on the results, you and your healthcare provider can then plan treatment. Until a diagnosis is made, the home care tips below can help ease symptoms.   Home care    Do pelvic floor muscle exercises, if they are prescribed. The pelvic floor muscles help support the bladder and urethra. Many women find that their symptoms improve when doing special exercises that strengthen these muscles. To do the exercises, contract the muscles you would use to stop your stream of urine. But do this when you re not urinating. Hold for 10 seconds, then relax. Repeat 10 to 20 times in a row, at least 3 times a day. Your healthcare provider may give you other instructions for how to do the exercises and how often.    Keep a bladder diary. This helps track how often and how much you urinate over a set period of time. Bring this diary with you to your next visit with the provider. The information can help your provider learn more about your bladder problem.    Lose weight, if advised to by your provider. Extra weight puts pressure on the bladder. Your provider can help you create a weight-loss plan that s right for you. This may include exercising more and making certain diet changes.    Don't have foods and drinks that may irritate the bladder.  These can include alcohol and caffeinated drinks.    Quit smoking. Smoking and other tobacco use can lead to a long-term (chronic) cough that strains the pelvic floor muscles. Smoking may also damage the bladder and urethra. Talk with your provider about treatments or methods you can use to quit smoking.    If drinking large amounts of fluid makes you have symptoms, you may be advised to limit your fluid intake. You may also be advised to drink most of your fluids during the day and to limit fluids at night.    If you re worried about urine leakage or accidents, you may wear absorbent pads to catch urine. Change the pads often. This helps reduce discomfort. It may also reduce the risk of skin or bladder infections.    Follow-up care  Follow up with your healthcare provider, or as directed. It may take some to find the right treatment for your problem. But healthy lifestyle changes can be made right away. These include such things as exercising on a regular basis, eating a healthy diet, losing weight (if needed), and quitting smoking. Your treatment plan may include special therapies or medicines. Certain procedures or surgery may also be options. Talk about any questions you have with your provider.   When to seek medical advice  Call the healthcare provider right away if any of these occur:    Fever of 100.4 F (38 C) or higher, or as directed by your provider    Bladder pain or fullness    Belly swelling    Nausea or vomiting    Back pain    Weakness, dizziness, or fainting  [employer] last reviewed this educational content on 1/1/2020 2000-2021 [employer]. All rights reserved. This information is not intended as a substitute for professional medical care. Always follow your healthcare professional's instructions.

## 2021-05-20 ENCOUNTER — HOSPITAL ENCOUNTER (OUTPATIENT)
Dept: PHYSICAL THERAPY | Facility: CLINIC | Age: 36
Setting detail: THERAPIES SERIES
End: 2021-05-20
Attending: NURSE PRACTITIONER
Payer: MEDICARE

## 2021-05-20 DIAGNOSIS — M25.511 ACUTE PAIN OF RIGHT SHOULDER: ICD-10-CM

## 2021-05-20 LAB
C TRACH DNA SPEC QL NAA+PROBE: NEGATIVE
N GONORRHOEA DNA SPEC QL NAA+PROBE: NEGATIVE
SPECIMEN SOURCE: NORMAL
SPECIMEN SOURCE: NORMAL

## 2021-05-20 PROCEDURE — 97110 THERAPEUTIC EXERCISES: CPT | Mod: GP | Performed by: PHYSICAL THERAPIST

## 2021-05-20 PROCEDURE — 97161 PT EVAL LOW COMPLEX 20 MIN: CPT | Mod: GP | Performed by: PHYSICAL THERAPIST

## 2021-05-20 PROCEDURE — 97535 SELF CARE MNGMENT TRAINING: CPT | Mod: GP | Performed by: PHYSICAL THERAPIST

## 2021-05-21 LAB
COPATH REPORT: NORMAL
PAP: NORMAL

## 2021-05-21 NOTE — PROGRESS NOTES
[facility]          OUTPATIENT PHYSICAL THERAPY ORTHOPEDIC EVALUATION  PLAN OF TREATMENT FOR OUTPATIENT REHABILITATION  (COMPLETE FOR INITIAL CLAIMS ONLY)  Patient's Last Name, First Name, M.I.  YOB: 1985  Maddy Ortiz    Provider s Name:  [facility]   Medical Record No.  [id number]   Start of Care Date:  05/20/21   Onset Date:      Type:     _X__PT   ___OT   ___SLP Medical Diagnosis:  Urinary incontinence, unspecified type R32  - Primary      PT Diagnosis:  PFM weakness   Visits from SOC:  1      _________________________________________________________________________________  Plan of Treatment/Functional Goals:  balance training, gait training, joint mobilization, manual therapy, ADL retraining, neuromuscular re-education, ROM, strengthening, stretching     Biofeedback, Cryotherapy, Electrical stimulation, Hot packs, TENS, Traction, Ultrasound     Goals  Goal Identifier: stress  Goal Description: Pt will relates leaking less than 1/5 times w cough sneeze  Target Date: 06/17/21    Goal Identifier: fluid intake  Goal Description: Pt will drink approx 100 oz of fluid per day with approx 2/3 being water to reduce bladder irritation and urgency  Target Date: 06/17/21    Goal Identifier: HEP  Goal Description: Pt will be ind in HEP to prevent return of symptoms  Target Date: 07/15/21    Goal Identifier: Dry  Goal Description: Pt will report staying dry 12/14 days  Target Date: 07/15/21                                                Therapy Frequency:  1 time/week  Predicted Duration of Therapy Intervention:  8 weeks    Cayla Jalloh, PT                 I CERTIFY THE NEED FOR THESE SERVICES FURNISHED UNDER        THIS PLAN OF TREATMENT AND WHILE UNDER MY CARE     (Physician co-signature of this document indicates review and certification of the therapy plan).                       Certification Date From:  05/20/21  Certification Date To:   07/16/21    Referring Provider:  Shirley Freeman APRN CNP     Initial Assessment        See Epic Evaluation Start of Care Date: 05/20/21 05/20/21 0700   General Information   Type of Visit Initial OP Ortho PT Evaluation   Start of Care Date 05/20/21   Referring Physician Shirley Freeman APRN CNP    Patient/Family Goals Statement reduce leaking   Orders Evaluate and Treat   Date of Order 04/26/21   Certification Required? Yes   Medical Diagnosis Urinary incontinence, unspecified type R32  - Primary    Surgical/Medical history reviewed Yes   Precautions/Limitations no known precautions/limitations   General Information Comments PMH:    Body Part(s)   Body Part(s) Pelvic Floor Dysfunction;Lumbar Spine/SI   Presentation and Etiology   Pertinent history of current problem (include personal factors and/or comorbidities that impact the POC) Pt relates she has been leaking on and off for years. Pt would like a product to help w strengthenign. Pt relates has been leaking since she was a child. Most leaking occurs w coughing. Pt relates worse leaking when LBP is bad. Pt relates she was seeing chiropractor but too busy at this time. Pt denies pain in pelvic floor but relates LBP w cracking. Pt has been trying to do kegels but relats may need more motivation.    Impairments A. Pain;C. Swelling;D. Decreased ROM;E. Decreased flexibility;H. Impaired gait;M. Locking or catching;P. Bowel or bladder problems   Functional Limitations perform activities of daily living   Symptom Location incontinence   Chronicity Recurrent   Pain rating (0-10 point scale) Best (/10);Worst (/10)   Best (/10) 3   Worst (/10) 7   Frequency of pain/symptoms C. With activity   Pain/symptoms exacerbated by E. Rest   Pain/symptoms eased by E. Changing positions;H. Cold   Progression of symptoms since onset: Worsened   Current Level of Function   Current Community Support Family/friend caregiver   Patient role/employment history G.  "Disabled   Living environment [facility]   Fall Risk Screen   Fall screen completed by PT   Have you fallen 2 or more times in the past year? No   Have you fallen and had an injury in the past year? No   Is patient a fall risk? No   Functional Scales   Functional Scales Other   Other Scales  JARROD-6: AUA: 31   Specific Questions   Specific Questions Pelvic Floor Dysfunction;Pregnancy;Women's Health   Pelvic Floor Dysfunction Questions   Regular exercise Yes  (bike to work, cleaning, )   Fluid intake-glasses/day (one glass/cup = 8oz 100+ oz   Caffeinated beverages-glasses/day 100+   Alcoholic beverages - glasses/day 0   Recent diet change? No  (loss of appetite due to med change)   How long can you delay the need to urinate?  3 hours   How many times do you wake to urinate at night?   3   How often do you urinate during the day?   6   Can you stop the flow of urine when on the toilet?  Yes   Is the volume of urine passed usually  Medium   Do you have the sensation that you need to go to the toilet?  Yes   Do you empty your bladder frequently, before you experience the urge to pass urine?  Yes   Do you have \"triggers\" that make you feel you can't wait to go to the toilet?  Yes  (sitting, cough, anxiety, cool air)   Number of bladder infections last year?  1 - just got off medication    Frequency of bowel movements:  3-4 per day   Consistency of stool?  Soft   Do you ignore the urge to defecate?  Yes   Women's Health Questions   Number of pregnancies  1   Number of vaginal deliveries  1 - 12 yrs ago   Number of episiotomies  1   Pelvic Floor Dysfunction Objective Findings   Protection needed Pad   Pad 2 per day   Planned Therapy Interventions   Planned Therapy Interventions balance training;gait training;joint mobilization;manual therapy;ADL retraining;neuromuscular re-education;ROM;strengthening;stretching   Planned Modality Interventions   Planned Modality Interventions Biofeedback;Cryotherapy;Electrical " stimulation;Hot packs;TENS;Traction;Ultrasound   Clinical Impression   Criteria for Skilled Therapeutic Interventions Met yes, treatment indicated   PT Diagnosis PFM weakness   Influenced by the following impairments poor tolieting schedueld, bladder irritants, weakness   Functional limitations due to impairments stress incontinenvce   Clinical Presentation Stable/Uncomplicated   Clinical Presentation Rationale Pt is pleasant 35 yr old old motivated to reduce incontinucen however has been dealing with for years and also has mental health issues that may effect treatment   Clinical Decision Making (Complexity) Low complexity   Therapy Frequency 1 time/week   Predicted Duration of Therapy Intervention (days/wks) 8 weeks   Risk & Benefits of therapy have been explained Yes   Patient, Family & other staff in agreement with plan of care Yes   Education Assessment   Preferred Learning Style Listening;Reading;Demonstration;Pictures/video   Barriers to Learning No barriers   ORTHO GOALS   PT Ortho Eval Goals 1;2;3;4   Ortho Goal 1   Goal Identifier stress   Goal Description Pt will relates leaking less than 1/5 times w cough sneeze   Target Date 06/17/21   Ortho Goal 2   Goal Identifier fluid intake   Goal Description Pt will drink approx 100 oz of fluid per day with approx 2/3 being water to reduce bladder irritation and urgency   Target Date 06/17/21   Ortho Goal 3   Goal Identifier HEP   Goal Description Pt will be ind in HEP to prevent return of symptoms   Target Date 07/15/21   Ortho Goal 4   Goal Identifier Dry   Goal Description Pt will report staying dry 12/14 days   Target Date 07/15/21   Total Evaluation Time   PT Eval, Low Complexity Minutes (87789) 30   Therapy Certification   Certification date from 05/20/21   Certification date to 07/16/21   Medical Diagnosis Urinary incontinence, unspecified type R32  - Primary

## 2021-05-24 LAB
FINAL DIAGNOSIS: NORMAL
HPV HR 12 DNA CVX QL NAA+PROBE: NEGATIVE
HPV16 DNA SPEC QL NAA+PROBE: NEGATIVE
HPV18 DNA SPEC QL NAA+PROBE: NEGATIVE
SPECIMEN DESCRIPTION: NORMAL
SPECIMEN SOURCE CVX/VAG CYTO: NORMAL

## 2021-05-29 NOTE — PROGRESS NOTES
HPI:  Maddy Ortiz is a 33 y.o. year old female with weight related co-morbidities of   Patient Active Problem List   Diagnosis     Allergic rhinitis due to animal dander     Anxiety state     Gastroesophageal reflux disease without esophagitis     Insomnia     Paranoid type delusional disorder (H)     Schizoaffective disorder, bipolar type (H)    who presents for medical bariatric consultation in the setting of weight related co-morbidities.  Her BMI is Body mass index is 34.14 kg/m ..      Assessment: Maddy is a 33 y.o. year old female who presents for medical weight management.      Plan:    1. Obesity  We discussed healthy habits to assist with weight loss.  She will try cutting out her sugared soda.  She will try decreasing eating out.  She will work on getting adequate protein.  She has a  to work with her and may be able to assist her with getting to the grocery store.  She will continue her Topamax.  We will add metformin.  Risks benefits and possible side effects were discussed.  She may possibly be a candidate for phentermine but she does have bipolar schizoaffective disorder and is worried about it triggering a manic episode.    2. GERD  This seems to have improved with a change in medication      Follow up: Next available with her dietitian and in 3 months with myself      >50 minutes spent with patient, > 50% spent in counseling          Counseling:  We discussed [facility] Bariatric Basics including:  -eating 3 meals daily  -eating protein first  -eating slowly, chewing food well  -avoiding/limiting calorie containing beverages  -choosing wheat, not white with breads, crackers, pastas, marquita, bagels, tortillas, rice  -limiting carbohydrates in general  -limiting restaurant or cafeteria eating to twice a week or less    We discussed the importance of restorative sleep and stress management in maintaining a healthy weight.    We reviewed medications associated with weight gain.    We discussed  insulin resistance and glycemic index as it relates to appetite and weight control.     We discussed the National Weight Control Registry healthy weight maintenance strategies and ways to optimize metabolism.  We discussed the importance of physical activity including cardiovascular and strength training in maintaining a healthier weight and explored viable options.    We discussed medications available for weight loss including phentermine, phendimetrazine, topamax, qsymia, lorcaserin, contrave, diethylproprion, and orlistat. We discussed the risks and benefits of each. We discussed indications, contraindications, potential side effects, and estimated costs of each. Literature was offered.    History Surrouding Consultation:  Struggles with weight started at age 15  Her weight at age 17 was 160 pounds  She has had several past supervised and unsupervised weight loss attempts  The most weight lost was: Unsure  Unfortunately there was not durable weight maintenance.  History of bulimia, anorexia, or binge eating disorder? She has binged in the past  If Present has eating disorder been in remission at least 3 years? na    Dietary History  Meals per day: 2  Snacks: occasionally  Typical Snack: sandwich  Who does the grocery shopping? Patient (she struggles with getting to the store because of her paranoia)  Who does the cooking? patient  A typical meal includes: B: skips  L: sandwich with turkey, robert and cheese and a sandwich with peanut butter or eats out  D: peanut butter sandwiches  Regular Pop: coke 2 glasses per day  Juice: none  Caffeine: in coke  Amount of restaurant eating per week: 3  Eating a the table with the TV off? Most of the time    Physical Activity Patterns  Current physical activity routine includes[place] [facility], [place], 3-4 x per week    Limitations from being physically active on a regular basis includes: weather        PMH: See problem list  Past Surgical Hx:  Past Surgical History:    Procedure Laterality Date     TONSILLECTOMY AND ADENOIDECTOMY         Medication:  Current Outpatient Medications on File Prior to Visit   Medication Sig Dispense Refill     EPINEPHrine (EPIPEN/ADRENACLICK/AUVI-Q) 0.3 mg/0.3 mL injection Inject 0.3 mg into the shoulder, thigh, or buttocks as needed.       levonorgestrel (MIRENA) 20 mcg/24 hours (5 yrs) 52 mg IUD 20 mcg by Intrauterine route.       lurasidone (LATUDA) 80 mg Tab tablet Take 160 mg by mouth daily with supper.       OLANZapine (ZYPREXA) 5 MG tablet Take 5 mg by mouth daily.       topiramate (TOPAMAX) 100 MG tablet Take 200 mg by mouth daily.       azelastine (ASTELIN) 137 mcg (0.1 %) nasal spray 2 sprays into each nostril 2 (two) times a day.       No current facility-administered medications on file prior to visit.        Allergies:Animal dander; Mold/mildew; and Pollen    Family Hx:  Family History   Adopted: Yes       Social Hx:  Social History     Socioeconomic History     Marital status: Single     Spouse name: Not on file     Number of children: Not on file     Years of education: Not on file     Highest education level: Not on file   Occupational History     Not on file   Social Needs     Financial resource strain: Not on file     Food insecurity:     Worry: Not on file     Inability: Not on file     Transportation needs:     Medical: Not on file     Non-medical: Not on file   Tobacco Use     Smoking status: Never Smoker     Smokeless tobacco: Never Used   Substance and Sexual Activity     Alcohol use: Not Currently     Drug use: Not on file     Sexual activity: Not on file   Lifestyle     Physical activity:     Days per week: Not on file     Minutes per session: Not on file     Stress: Not on file   Relationships     Social connections:     Talks on phone: Not on file     Gets together: Not on file     Attends Mosque service: Not on file     Active member of club or organization: Not on file     Attends meetings of clubs or organizations:  "Not on file     Relationship status: Not on file     Intimate partner violence:     Fear of current or ex partner: Not on file     Emotionally abused: Not on file     Physically abused: Not on file     Forced sexual activity: Not on file   Other Topics Concern     Not on file   Social History Narrative     Not on file       Tobacco Use Hx:  Social History     Tobacco Use   Smoking Status Never Smoker   Smokeless Tobacco Never Used       ROS  General  Fatigue: yes  Sleep Quality:good  HEENT  Hx of glaucoma: no  Vision changes: no  Cardiovascular  Chest Pain with Exertion: no  Palpitations: no  Hx of heart disease: no  Pulmonary  Shortness of breath at rest: no  Shortness of breath with exertion: no  Snoring: no  Stop-bang score: 2  Madison Score: 9  Gastrointestinal  Heartburn: no  Abdominal pain: some nausea with her medications  Psychiatric  Moods Stable: yes  Endocrine  Polydipsia: no  Polyuria: no  Neurologic:  Hx of seizures: no  Dermatologic  Rashes: no      /82 (Patient Site: Right Arm, Patient Position: Sitting, Cuff Size: Adult Large)   Pulse (!) 104   Ht 5' 4.5\" (1.638 m)   Wt 202 lb (91.6 kg)   SpO2 97%   Breastfeeding? No   BMI 34.14 kg/m    Wt Readings from Last 2 Encounters:   05/29/19 202 lb (91.6 kg)     Body mass index is 34.14 kg/m .  Neck circumference/Waist Circumference: Height: 5' 4.5\" (1.638 m) (5/29/2019  2:51 PM)  Initial Weight: 202 lbs (5/29/2019  2:51 PM)  Weight: 202 lb (91.6 kg) (5/29/2019  2:51 PM)  Weight loss from initial: 0 (5/29/2019  2:51 PM)  % Weight loss: 0 % (5/29/2019  2:51 PM)  BMI (Calculated): 34.2 (5/29/2019  2:51 PM)  SpO2: 97 % (5/29/2019  2:51 PM)  Waist Circumference (In): 42.25 Inches (5/29/2019  2:51 PM)  Hip Circumference (In): 44.5 Inches (5/29/2019  2:51 PM)  Neck Circumference (In): 17 Inches (5/29/2019  2:51 PM)        Physical Exam:    GEN: Alert and oriented in no acute distress.   HEENT: PERRLA, mucous membranes moist. Airway adequate  NECK: " Supple without LAD or thyromegaly. Carotid bruits absent.  LUNGS: CTA without wheezes or crackles, good air movement throughout  CV: RRR no MRG  ABDOMEN: Moderate protuberance, BS normal, non tender to palpation, no rebound or guarding, no HSM  SKIN: no rashes, no skin tags,  no acanthosis nigrans  EXTREMITIES:  No edema    Labs:    No results found for: WBC, HGB, HCT, MCV, PLT  No results found for: CHOL  No results found for: HDL  No results found for: LDLCALC  No results found for: TRIG  No components found for: CHOLHDL  No results found for: ALT, AST, GGT, ALKPHOS, BILITOT  No results found for: HGBA1C  No results found for: [id number]    Much or all of the text in this note was generated through the use of Dragon Dictate voice-to-text software. Errors in spelling or words which seem out of context are unintentional. Sound alike errors, in particular, may have escaped editing.  Answers for HPI/ROS submitted by the patient on 5/22/2019   BARIATRIC NEW PATIENT  Interested in Surgery?: No  [place] About?: Primary Care Doctor  Barriers to learning:: No  Attended Seminar?: No  Email for Newsletter:: [email or web address]  PCP:: Shirley Harrison, [facility]  Specialist:: EILEEN Griffith, [facility]  Mental Health Provider:: Sridevi Whitehead, [facility], Psychologist, [address] [place] [address], [address], [place] [address], [phone], [phone] (fax), , Dr. Montemayor, [facility], Psychiatrist, [facility] [address], [place] [address], [phone], [phone] (fax)  Present Ht:: 63  Present Wt:: 200  Age Overweight:: 15  Wt 17yrs:: 160  Wt 5yrs ago:: 140  # of Wt Loss Efforts:: 1  Prescribed Wt Loss Meds?: Yes  Prescribed Meds:: Topamax  OTC Wt Loss Meds?: No  Program1:: Dr. Beatty  Start Date1:: 6/1/2018  End Date1:: 12/31/2019  Total Months1:: 6  Starting Wt1:: 190  Ending Wt1:: 180  Pounds Lost1:: 10

## 2021-05-29 NOTE — TELEPHONE ENCOUNTER
Spoke to the patient but she was unable to talk at that time because she was in another office visit.  She will call back later in the week, likely Thursday.  Jeannette Iyer RN

## 2021-05-29 NOTE — TELEPHONE ENCOUNTER
Called the patient again to reinforce that the metformin isn't likely causing the fatigue.  Patient said she couldn't talk right now and hung up on me again.  Dr. Serna also sent her a [employer] message which gave the same message.  Jeannette Iyer RN

## 2021-05-29 NOTE — PATIENT INSTRUCTIONS - HE

## 2021-05-29 NOTE — TELEPHONE ENCOUNTER
Called the patient back to discuss her medications and the side effects she is having.  We discussed her backing off to one tablet daily for now or even stopping it altogether because she isn't sure it is even effective at this point, along with her naltrexone she has been taking.  She would like to discuss other medication options to help her with her weight loss.  I let her know that Dr. Serna isn't back in clinic until next Tuesday and she may want to see her in clinic to discuss other med options.  Jeannette Iyer RN

## 2021-05-29 NOTE — TELEPHONE ENCOUNTER
Per patient, her medication is making her really sleepy. She would like a nurse to call and follow up.

## 2021-05-29 NOTE — PROGRESS NOTES
Medical  Weight Loss Initial Diet Evaluation    Maddy is a 33 y.o. year old female presenting today for a new weight management nutrition consultation. Pt has also had an initial appointment with Bariatrician Dr. Serna.    Patient would like to manage weight. Patient is taking topamax and metformin. Goal weight is 140lb as this was the weight she was when working. She reports having paranoia and is working her her Arms Worker to go out and get comfortable with grocery shopping. She reports eating out frequently and states she has limited funds for food.   Nutrition Assessment:   Anthropometrics:  Pt's Initial Weight: 202 lbs  Weight: 203 lb 4.8 oz (92.2 kg)  Weight loss from initial: -1.3  % Weight loss: -0.64 %    BMI: Body mass index is 34.36 kg/m .  IBW: 120-130lb  Estimated RMR (Boyd-St Jeor equation): 1617kcal  Estimated protein needs (.5 grams to .9 grams per pound IBW): 60-90g    Medical History:  Patient Active Problem List   Diagnosis     Allergic rhinitis due to animal dander     Anxiety state     Gastroesophageal reflux disease without esophagitis     Insomnia     Paranoid type delusional disorder (H)     Schizoaffective disorder, bipolar type (H)       Nutrition History:   Food allergies/intolerances/restrictions: No   Biggest weight loss struggle per pt: lack of physical activity, patient reports getting really hungry   Vitamins/Mineral Supplementation: none    Dietary Recall: wake up 7a-8a  Breakfast: skip  Snack:none  Lunch: 12p-turkey sandwich with robert, potato salad and potato chips  Snack: none  Dinner: 5pm- cheeseburger and fries  Snack: none  Overnight eating: No    Hydration (type/oz. per day):  Water: 3 cups  Caffeine/carbonation: coffee occasionally  Juice: gatorade  Alcohol : none    Exercise:  Routine exercise established: No  Biking and walking with daughter.    Nutrition Diagnosis (PES statement):   (NB-1.7) Undesirable food choices related to food and nutrition related knowledge  deficit as evidenced by Intake of high caloric density foods/beverages (juice, soda, alcohol) at meals and/or snacks; large portion; frequent grazing; Estimated intake that exceeds estimated daily energy intake; Binge eating patterns; Frequent excessive fast food or restaurant intake; and BMI 34.36  Nutrition Intervention:  1. Discussed with patient how to build a meal: the importance of including a lean/low fat protein at each meal, include a source of vegetables at a minimum of lunch and dinner, and limiting carbohydrate intake to 1 serving (15 grams) per meal.  2. Placed emphasis on importance of developing a healthy eating routine, aiming for 3 meals a day and no snacks.   3. Educated on sources of lean protein, portion sizes, and the amount of grams found in each source. Recommend pt to aim for 20-30 grams of protein at each meal; 60-80 grams per day.  4. Discussed eating out and making better choices when eating fast food   Handouts provided:  Plate Method  List of Lean Protein Sources  Nutrition Monitoring/Evaluation:   Nutrition Goal Established by Patient:  1. Choose salad instead of fries when eating out.  2. Work with Arms worker to get comfortable with grocery shopping   Follow up/Monitoring:  Will monitor weight change, protein intake, hydration, fruit and vegetable intake and exercise for next visit.  Follow up with RD in 1 month      Time In: 9:25a  Time Out: 9:50a  ABN: Yes

## 2021-05-30 NOTE — PROGRESS NOTES
Medical  Weight Loss Follow-Up Diet Evaluation  Assessment:  Maddy is presenting today for a follow up weight management nutrition consultation. Pt has had an initial appointment with Dr. Serna.  Weight loss medication: Topamax and not taking metformin  Pt's Initial Weight: 202 lbs  Weight: 200 lb (90.7 kg)  Weight loss from initial: 2  % Weight loss: 0.99 %    BMI: Body mass index is 33.8 kg/m .  IBW: 120-130 lbs    Estimated RMR ([place]-St Jeor equation): 1603kcal   Recommended Protein Intake: 60-80 grams of protein/day  Patient Active Problem List:  Patient Active Problem List   Diagnosis     Allergic rhinitis due to animal dander     Anxiety state     Gastroesophageal reflux disease without esophagitis     Insomnia     Paranoid type delusional disorder (H)     Schizoaffective disorder, bipolar type (H)     Progress on goals from last visit: patient has been shopping  1. Choosing salad instead of fries- goal not met  2. Improve comfort with grocery shopping- goal met- patient has been shopping and has been making better choices with foods such as choosing vegetables and salads     Dietary Recall:  Breakfast: skip  Snack: none  Lunch: [employer]- chicken sandwich and potato wedges and biscuit  Snack: none  Dinner: [place] toast  Snack: none  Overnight eating: No  Eating out (frequency/week): couple times per week- fast food  Hydration (type/oz. per day):  Water: unsure of how much water  Caffeine: regular- 3 cups per day  Carbonation: none  Juice: rare occasions  Alcohol : none  Exercise:  Routine exercise established: Yes  Walking to places- was walking 20 miles a few days per week when daughter was in camp     Nutrition Diagnosis:    (NB-1.7) Undesirable food choices related to lack of motivation and/or readiness to apply change as evidenced by Intake of high caloric density foods/beverages (juice, soda, alcohol) at meals; large portion; Estimated intake that exceeds estimated daily energy intake; Frequent excessive  fast food or restaurant intake; and BMI      Intervention:  1. Recommend calorie/nutrient modification    Implementation:  1. Reviewed progress with previous goals  2. Nutrition Education- hydration and eating three meals per day  3. Reviewed meal planning    Monitoring/Evaluation:    Goals:  1. Eliminate soda intake- aim to drink water instead  2. Start taking metformin again      Follow up:  Pt will follow up in 1 month(s) with bariatrician and 2 month(s) with dietitian.     Time spent with patient: 30 minutes  France Kingsley RD     ABN signed: Yes

## 2021-05-31 NOTE — PROGRESS NOTES
[facility] Care Clinic Non Surgical Follow up Visit   Date of visit: 8/27/2019  Physician: Anuja Serna MD  Primary Care is Shirley Freeman  Maddy Ortiz   33 y.o.  female    Initial Weight: 202 pounds  Initial BMI: 34.14  Today's Weight:   Wt Readings from Last 1 Encounters:   08/27/19 201 lb 8 oz (91.4 kg)     Body mass index is 34.05 kg/m .  Weight: 201 lb 8 oz (91.4 kg)       Assessment and Plan   Assessment: Maddy is a 33 y.o. year old female who presents for medical weight management.      Plan:    1. Obesity, Class I, BMI 30.0-34.9 (see actual BMI)  Patient was congratulated on her success thus far.  Again reinforced the need to eliminate all sugared beverages.  She feels that drinking 4 cups of soda and juice per day is not problematic for her weight.  We will try increasing her vegetables and making sure she gets protein at 3 separate meals per day.  She will continue the Topamax.  We could try phentermine in the future but I am a little worried that it may trigger a manic episode for her.    2. Gastroesophageal reflux disease without esophagitis  This may improve with weight loss      Follow up in 1 month with a dietitian in 2 months with myself           INTERIM HISTORY  Patient stopped her metformin because she was very tired. Her energy improved after stopping the metformin. She is now sleeping 7-8 hours at night and sometimes naps.    DIETARY HISTORY  Meals Per Day: 2-3  Eating Protein First?: no  Food Diary: B:pizza or skips L:sandwich with turkey and cheese and robert on whole grain bread D:wants to start cooking more, is eating sandwiches or frozen pizza or macaroni and cheese  Snacks Per Day: none  Typical Snack: na  Fluid Intake: Lots of juice and soda minimal water  Portion Control: Working on it  Calorie Containing Beverages: soda 2 cups per day, juice 2 cups per day  Typical Protein Food Choices: Meat, cheese, deli meat  Choosing Whole Grains: sometimes  Meals at Restaurant per  week: Not discussed      Positive Changes Since Last Visit: Improved portion control  Struggling With: Drinking sugared beverages, exercise, eating 3 meals a day, protein intake, vegetable intake    Knowledgeable in Reading Food Labels: No  Getting Adequate Protein: No  Sleeping 7-8 hours/day yes  Stress management not discussed    PHYSICAL ACTIVITY PATTERNS:  Cardiovascular: cleaning the apartment, some walking  Strength Training: cleaning the apartment    REVIEW OF SYSTEMS  GENERAL/CONSTITUTIONAL:  Fatigue: Yes  HEENT:  Vision changes, glaucoma: No  CARDIOVASCULAR:  Chest Pain with Exertion: No  PULMONARY:  Dyspnea on exertion: Yes  PSYCHIATRIC:  Moods: Stable per patient  ENDOCRINE:  Monitoring Blood Sugars: Not applicable  Sugars Well Controlled: Not applicable       Patient Profile   Social History     Social History Narrative     Not on file        Past Medical History   No past medical history on file.  Patient Active Problem List   Diagnosis     Allergic rhinitis due to animal dander     Anxiety state     Gastroesophageal reflux disease without esophagitis     Insomnia     Paranoid type delusional disorder (H)     Schizoaffective disorder, bipolar type (H)     Current Outpatient Medications   Medication Sig     buPROPion (WELLBUTRIN SR) 100 MG 12 hr tablet TAKE 1 TABLET BY MOUTH TWICE DAILY FOR 1 WEEK THEN 2 TABLETS EVERY MORNING AND 1 TABLET AT 5PM     EPINEPHrine (EPIPEN/ADRENACLICK/AUVI-Q) 0.3 mg/0.3 mL injection Inject 0.3 mg into the shoulder, thigh, or buttocks as needed.     levonorgestrel (MIRENA) 20 mcg/24 hours (5 yrs) 52 mg IUD 20 mcg by Intrauterine route.     lurasidone (LATUDA) 80 mg Tab tablet Take 160 mg by mouth daily with supper.     topiramate (TOPAMAX) 100 MG tablet Take 200 mg by mouth daily.     azelastine (ASTELIN) 137 mcg (0.1 %) nasal spray 2 sprays into each nostril 2 (two) times a day.     metFORMIN (GLUCOPHAGE-XR) 500 MG 24 hr tablet 1 tab with dinner x q week then 2 tabs with  "dinner every day     OLANZapine (ZYPREXA) 5 MG tablet Take 5 mg by mouth daily.       Past Surgical History  She has a past surgical history that includes Tonsillectomy and adenoidectomy.     Examination   /72   Pulse 87   Resp 14   Ht 5' 4.5\" (1.638 m)   Wt 201 lb 8 oz (91.4 kg)   SpO2 98%   BMI 34.05 kg/m    Height: 5' 4.5\" (1.638 m) (8/27/2019 10:26 AM)  Initial Weight: 202 lbs (7/11/2019 10:00 AM)  Weight: 201 lb 8 oz (91.4 kg) (8/27/2019 10:26 AM)  Weight loss from initial: 2 (7/11/2019 10:00 AM)  % Weight loss: 0.99 % (7/11/2019 10:00 AM)  BMI (Calculated): 34.1 (8/27/2019 10:26 AM)  SpO2: 98 % (8/27/2019 10:26 AM)  Waist Circumference (In): 42.25 Inches (5/29/2019  2:51 PM)  Hip Circumference (In): 44.5 Inches (5/29/2019  2:51 PM)  Neck Circumference (In): 17 Inches (5/29/2019  2:51 PM)    General:  Alert and ambulatory, NAD  HEENT: Moist mucous membranes, neck is without LAD  Pulmonary:  Normal respiratory effort, no cough, no audible wheezes/crackles.  CV:  Regular rate and Rhythm, no murmurs  Pscyh/Mood: stable         Counseling:   We reviewed the important post op bariatric recommendations:  -eating 3 meals daily  -eating protein first, getting >60gm protein daily  -eating slowly, chewing food well  -avoiding/limiting calorie containing beverages  -limiting starchy vegetables and carbohydrates, choosing wheat, not white with breads,   crackers, pastas, marquita, bagels, tortillas, rice  -limiting restaurant or cafeteria eating to twice a week or less    We discussed the importance of restorative sleep and stress management in maintaining a healthy weight.  We discussed the National Weight Control Registry healthy weight maintenance strategies and ways to optimize metabolism.  We discussed the importance of physical activity including cardiovascular and strength training in maintaining a healthier weight.    > 25 min spent with patient, > 50% spent in counseling         LONDON Serna " MD  [facility].    Much or all of the text in this note was generated through the use of Dragon Dictate voice-to-text software. Errors in spelling or words which seem out of context are unintentional. Sound alike errors, in particular, may have escaped editing.

## 2021-06-02 VITALS — HEIGHT: 65 IN | WEIGHT: 203.3 LBS | BODY MASS INDEX: 33.87 KG/M2

## 2021-06-02 VITALS — BODY MASS INDEX: 33.66 KG/M2 | WEIGHT: 202 LBS | HEIGHT: 65 IN

## 2021-06-03 ENCOUNTER — HOSPITAL ENCOUNTER (OUTPATIENT)
Dept: PHYSICAL THERAPY | Facility: CLINIC | Age: 36
Setting detail: THERAPIES SERIES
End: 2021-06-03
Attending: NURSE PRACTITIONER
Payer: MEDICARE

## 2021-06-03 VITALS — BODY MASS INDEX: 33.57 KG/M2 | WEIGHT: 201.5 LBS | HEIGHT: 65 IN

## 2021-06-03 VITALS — BODY MASS INDEX: 33.32 KG/M2 | WEIGHT: 200 LBS | HEIGHT: 65 IN

## 2021-06-03 VITALS
SYSTOLIC BLOOD PRESSURE: 116 MMHG | WEIGHT: 201.01 LBS | HEART RATE: 93 BPM | DIASTOLIC BLOOD PRESSURE: 76 MMHG | RESPIRATION RATE: 16 BRPM | BODY MASS INDEX: 35.62 KG/M2 | OXYGEN SATURATION: 98 % | HEIGHT: 65 IN | HEIGHT: 63 IN | BODY MASS INDEX: 33.97 KG/M2

## 2021-06-03 PROCEDURE — 97140 MANUAL THERAPY 1/> REGIONS: CPT | Mod: GP | Performed by: PHYSICAL THERAPIST

## 2021-06-03 PROCEDURE — 97110 THERAPEUTIC EXERCISES: CPT | Mod: GP | Performed by: PHYSICAL THERAPIST

## 2021-06-03 PROCEDURE — 97535 SELF CARE MNGMENT TRAINING: CPT | Mod: GP | Performed by: PHYSICAL THERAPIST

## 2021-06-04 VITALS — WEIGHT: 210 LBS | HEIGHT: 63 IN | BODY MASS INDEX: 37.21 KG/M2

## 2021-06-04 VITALS — WEIGHT: 205.2 LBS | HEIGHT: 63 IN | BODY MASS INDEX: 36.36 KG/M2

## 2021-06-04 VITALS — BODY MASS INDEX: 38.09 KG/M2 | WEIGHT: 215 LBS | HEIGHT: 63 IN

## 2021-06-04 NOTE — PROGRESS NOTES
[facility] Non Surgical Follow up Visit   Date of visit: 12/11/2019  Physician: Anuja Serna MD  Primary Care is Shirley Freeman  Maddy Ortiz   34 y.o.  female    Initial Weight: 202#  Initial BMI: 34.14  Today's Weight:   Wt Readings from Last 1 Encounters:   08/27/19 201 lb 8 oz (91.4 kg)     There is no height or weight on file to calculate BMI.  No data recorded     Assessment and Plan   Assessment: Maddy is a 34 y.o. year old female who presents for medical weight management.      Plan:    1. Obesity, Class I, BMI 30.0-34.9 (see actual BMI)  We discussed healthy habits to assist with weight loss.  She is going to try to go to the website [employer] for some instruction on muscle building exercise.  She will continue to walk and bike when she can.  Is going to start looking at labels and try to keep each meal to less than 10 g sugar including beverages.  She is not interested in restarting Topamax.  She did not tolerate metformin.  For the phentermine would cause a manic episode or increase her anxiety.    2. Gastroesophageal reflux disease without esophagitis  This may improve with healthy habits and weight loss      Follow up next month with her dietitian and in 6 months with myself.           INTERIM HISTORY  Patient has had many changes to her psychiatric medications. Because of this, she stopped the topamax. She continues to drink a large amount of sugared beverages and doesn't seem to understand that this is hindering her weight loss efforts.  She came in today drinking Gatorade.  She has been eating out more because the grocery stores have been more crowded with the holidays.  She will be getting help soon so that she can get groceries and do some cooking at home.    DIETARY HISTORY  See dietician note today  Calorie Containing Beverages: 4 bottles a day    Positive Changes Since Last Visit: Some increased walking  Struggling With: Drinking sugared beverages, eating out,  inadequate vegetable intake    Knowledgeable in Reading Food Labels: Not sure  Getting Adequate Protein: No  Sleeping 7-8 hours/day not discussed  Stress management not discussed    PHYSICAL ACTIVITY PATTERNS:  Cardiovascular: Some walking to appointments, occasional biking  Strength Training: None    REVIEW OF SYSTEMS  GENERAL/CONSTITUTIONAL:  Fatigue: Improved with change in medication  HEENT:  Vision changes, glaucoma: No  CARDIOVASCULAR:  Chest Pain with Exertion: No  PULMONARY:  Dyspnea on exertion: Yes  ENDOCRINE:  Monitoring Blood Sugars: na  Sugars Well Controlled: na       Patient Profile   Social History     Social History Narrative     Not on file        Past Medical History   No past medical history on file.  Patient Active Problem List   Diagnosis     Allergic rhinitis due to animal dander     Anxiety state     Gastroesophageal reflux disease without esophagitis     Insomnia     Paranoid type delusional disorder (H)     Schizoaffective disorder, bipolar type (H)     Current Outpatient Medications   Medication Sig     azelastine (ASTELIN) 137 mcg (0.1 %) nasal spray 2 sprays into each nostril 2 (two) times a day.     buPROPion (WELLBUTRIN SR) 100 MG 12 hr tablet TAKE 1 TABLET BY MOUTH TWICE DAILY FOR 1 WEEK THEN 2 TABLETS EVERY MORNING AND 1 TABLET AT 5PM     EPINEPHrine (EPIPEN/ADRENACLICK/AUVI-Q) 0.3 mg/0.3 mL injection Inject 0.3 mg into the shoulder, thigh, or buttocks as needed.     levonorgestrel (MIRENA) 20 mcg/24 hours (5 yrs) 52 mg IUD 20 mcg by Intrauterine route.     lurasidone (LATUDA) 80 mg Tab tablet Take 160 mg by mouth daily with supper.     metFORMIN (GLUCOPHAGE-XR) 500 MG 24 hr tablet 1 tab with dinner x q week then 2 tabs with dinner every day     OLANZapine (ZYPREXA) 5 MG tablet Take 5 mg by mouth daily.     topiramate (TOPAMAX) 100 MG tablet Take 200 mg by mouth daily.       Past Surgical History  She has a past surgical history that includes Tonsillectomy and adenoidectomy.  "    Examination   There were no vitals taken for this visit.  Height: 5' 4.5\" (1.638 m) (8/27/2019 10:26 AM)  Initial Weight: 202 lbs (7/11/2019 10:00 AM)  Weight: 201 lb 8 oz (91.4 kg) (8/27/2019 10:26 AM)  Weight loss from initial: 2 (7/11/2019 10:00 AM)  % Weight loss: 0.99 % (7/11/2019 10:00 AM)  BMI (Calculated): 34.1 (8/27/2019 10:26 AM)  SpO2: 98 % (8/27/2019 10:26 AM)  Waist Circumference (In): 42.25 Inches (5/29/2019  2:51 PM)  Hip Circumference (In): 44.5 Inches (5/29/2019  2:51 PM)  Neck Circumference (In): 17 Inches (5/29/2019  2:51 PM)    General:  Alert and ambulatory, NAD  Pscyh/Mood: stable         Counseling:   We reviewed the important post op bariatric recommendations:  -eating 3 meals daily  -eating protein first, getting >60gm protein daily  -eating slowly, chewing food well  -avoiding/limiting calorie containing beverages  -limiting starchy vegetables and carbohydrates, choosing wheat, not white with breads,   crackers, pastas, marquita, bagels, tortillas, rice  -limiting restaurant or cafeteria eating to twice a week or less    We discussed the importance of restorative sleep and stress management in maintaining a healthy weight.  .  We discussed the importance of physical activity including cardiovascular and strength training in maintaining a healthier weight.    > 15 min spent with patient, > 50% spent in counseling         LONDON Serna MD  [facility].    Much or all of the text in this note was generated through the use of Dragon Dictate voice-to-text software. Errors in spelling or words which seem out of context are unintentional. Sound alike errors, in particular, may have escaped editing.       "

## 2021-06-04 NOTE — PROGRESS NOTES
"Medical  Weight Loss Follow-Up Diet Evaluation  Assessment:  Maddy is presenting today for a follow up weight management nutrition consultation. Pt has had an initial appointment with Dr. Serna.  Weight loss medication: none- stopped taking both metformin and topamax.  Pt's Initial Weight: 202 lbs  Weight: 201 lb 0.2 oz (91.2 kg)    BMI: Body mass index is 34.05 kg/m .  IBW: 115-125 lbs    Estimated RMR ([place]-St Jeor equation): 1579kcal   Recommended Protein Intake: 60-80 grams of protein/day  Patient Active Problem List:  Patient Active Problem List   Diagnosis     Allergic rhinitis due to animal dander     Anxiety state     Gastroesophageal reflux disease without esophagitis     Insomnia     Paranoid type delusional disorder (H)     Schizoaffective disorder, bipolar type (H)     Diabetes: No     Progress on goals from last visit:   1. Eliminate soda- is not usually buying it- but will buy it, will drink it- 5 cups recently  Going to the grocery store by herself at times- has trouble at one store- is now going to [employer], going with Arms worker and will be getting an [employer] worker    Dietary Recall: couldn't go to the grocery store- will go tomorrow  Breakfast: pizza- 2 pieces  Lunch: 3 pieces of pizza  Dinner: 2 pieces of pizza  Not really snacking  Eating out (frequency/week): doing more recently due to holidays- has trouble going to the store- eating foods that are  \"kind of healthy\" no salads, but small portions  Hydration (type/oz. per day):  Water: very little  Juice: drinking gatorade- using powder mix  Alcohol : none  Exercise:  Routine exercise established: Yes  Walking and biking- when roads are clear     Nutrition Diagnosis:    Overweight/Obesity (NC 3.3) related to overeating and poor lifestyle habits as evidenced by intake of high calorie beverages and foods and BMI 35.6  Not ready for diet/lifestyle change (NB 1.3) related to unsupported beliefs/attitudes about food, nutrition and nutrition-related " topics as evidenced by patient's subjective statements, denial of need to change, inability to meet goals previously set  Intervention:  1. Nutrition counseling: motivational interviewing and goal setting  +reviewed label reading for sugar and serving size    Monitoring/Evaluation:    Goals:  1. Read labels for sugar- 10g or less  2. Try recipes from recipe resource provided    Follow up:  Pt will follow up in 2 month(s) with dietitian.     Time spent with patient: 30 minutes  France Kingsley RD     ABN signed: Yes

## 2021-06-06 NOTE — PROGRESS NOTES
Medical  Weight Loss Follow-Up Diet Evaluation  Assessment:  Maddy is presenting today for a follow up weight management nutrition consultation. Pt has had an initial appointment with Dr. Serna  Weight loss medication: none  Pt's Initial Weight: 202 lbs  Weight: 205 lb 3.2 oz (93.1 kg)  Weight loss from initial: -3.2  % Weight loss: -1.58 %    BMI: Body mass index is 36.35 kg/m .  IBW: 115-125 lbs    Estimated RMR ([place]-St Jeor equation): 1598kcal   Recommended Protein Intake: 60-80 grams of protein/day  Patient Active Problem List:  Patient Active Problem List   Diagnosis     Allergic rhinitis due to animal dander     Anxiety state     Gastroesophageal reflux disease without esophagitis     Insomnia     Paranoid type delusional disorder (H)     Schizoaffective disorder, bipolar type (H)     Diabetes: No    Progress on goals from last visit: replaced sinks and faucets and feels more comfortable drinking more water- would like to buy a water filter- plan to drink instead of gatorade and juice  Would like to work on increasing vegetables- is buying skillet meals with veggies  Is skipping meals and still eating out frequently    Dietary Recall:  Breakfast: egg sandwich- with robert  Snack: none  Lunch: crackers and ham  Snack: couple of nutrigrain grain bars and ho-ho  Eating out (frequency/week): has plans to stop eating out due to needing to budget- will be assigned an [employer] worker for shopping and meal planning  Hydration (type/oz. per day):  Water: more water  Juice: used to drink gatorade  Exercise:  Routine exercise established: Yes  Walking a lot- [employer]- sometimes using the fitness machines and swimming     Nutrition Diagnosis:    Overweight/Obesity (NC 3.3) related to overeating and poor lifestyle habits as evidenced by skipping meals, frequent eating out, eating high fat and sugar foods and BMI 36.35  Not ready for diet/lifestyle change (NB 1.3) related to unsupported beliefs/attitudes about food, nutrition  and nutrition-related topics as evidenced by patient's subjective statements, denial of need to change, inability to meet goals previously set  Intervention:  1. Food and/or nutrient delivery: encouraged increased veggie intake, spent some time talking about meal planning and prepping  2. Nutrition counseling: goal setting    Monitoring/Evaluation:    Goals:  1. Plan and prep 3 meals per week  2. 1 vegetable per day    Follow up:  Pt will follow up in 4 month(s) with bariatrician and PRN with dietitian.     Time spent with patient: 15 minutes  France Kingsley RD     ABN signed: Yes

## 2021-06-08 NOTE — PROGRESS NOTES
"Maddy Ortiz is a 34 y.o. female who is being evaluated via a billable telephone visit.      The patient has been notified of following:     \"This telephone visit will be conducted via a call between you and your physician/provider. We have found that certain health care needs can be provided without the need for a physical exam.  This service lets us provide the care you need with a short phone conversation.  If a prescription is necessary we can send it directly to your pharmacy.  If lab work is needed we can place an order for that and you can then stop by our lab to have the test done at a later time.    Telephone visits are billed at different rates depending on your insurance coverage. During this emergency period, for some insurers they may be billed the same as an in-person visit.  Please reach out to your insurance provider with any questions.    If during the course of the call the physician/provider feels a telephone visit is not appropriate, you will not be charged for this service.\"    Patient has given verbal consent to a Telephone visit? Yes    What phone number would you like to be contacted at? [phone]    Patient would like to receive their AVS by AVS Preference: [place].    Additional provider notes:     [facility] Clinic Non Surgical Follow up Visit   Date of visit: 6/16/2020  Physician: Anuja Serna MD  Primary Care is Shirley Freeman  Maddy Ortiz   34 y.o.  female    Initial Weight: 202#  Initial BMI: 34.14  Today's Weight:   Wt Readings from Last 1 Encounters:   06/16/20 210 lb (95.3 kg)     Body mass index is 37.2 kg/m .  Initial Weight: 202 lbs  Weight: 210 lb (95.3 kg) (pt reported)  Weight loss from initial: -8  % Weight loss: -3.96 %       Assessment and Plan   Assessment: Maddy is a 34 y.o. year old female who presents for medical weight management.      Plan:    Obesity, Class II, BMI 35-39.9, isolated (see actual BMI)  We discussed healthy habits to assist " with weight loss. She will try doing some exercise videos in her apartment.. She will start looking at nutrition labels and try to eat <10 gms sugar and >20 grams protein per meal. She will try to eat one vegetable a day. We discussed medication that may assist with weight loss. Topamax was prescribed. She was on this in the past and feels that it was helpful.  Risks/ benefits and possible side effects were discussed and questions were answered. Written information was given.     Gastroesophageal reflux disease without esophagitis  This may improve with healthy habits and weight loss.      Follow up in 1 month with the dietician and in 3 months with myself           INTERIM HISTORY  She has been gaining weight. She started a new psyche medication and thinks that this is interfering with her weight loss efforts. She has been more paranoid and doesn't want to go out side to exercise.    DIETARY HISTORY  Meals Per Day: 3  Eating Protein First?: sometimes  Food Diary: B:mom's meal eggs and starch and granola with fruit, she is given nutrition information but isn't looking at it L:sometimes meat and fruit or peanut butter sandwich D:peanut butter sandwich or chicken or hamburger  Snacks Per Day: varies  Typical Snack: not discussed  Fluid Intake: crystal light  Portion Control: not sure  Calorie Containing Beverages: stopped  Typical Protein Food Choices: meat, eggs  Choosing Whole Grains: sometimes  Meals not prepared at home per week:0-1    Positive Changes Since Last Visit: less eating out, cut back on sugared beverages, portion control  Struggling With: inadequate vegetables, exercise    Knowledgeable in Reading Food Labels: not doing it  Getting Adequate Protein: no  Sleeping 7-8 hours/day : struggling- too little or too much  Stress management: listens to music, meditate    PHYSICAL ACTIVITY PATTERNS:  Cardiovascular: none  Strength Training: none    REVIEW OF SYSTEMS  GENERAL/CONSTITUTIONAL:  Fatigue:  sometimes  HEENT:  Vision changes, glaucoma: no  CARDIOVASCULAR:  Chest Pain with Exertion: no  PULMONARY:  Dyspnea on exertion: yes  NEUROLOGIC:  Paresthesias: sometimes  PSYCHIATRIC:  Moods: paranoid, working with psychiatrist  ENDOCRINE:  Monitoring Blood Sugars: na  Sugars Well Controlled: na       Patient Profile   Social History     Social History Narrative     Not on file        Past Medical History   No past medical history on file.  Patient Active Problem List   Diagnosis     Allergic rhinitis due to animal dander     Anxiety state     Gastroesophageal reflux disease without esophagitis     Insomnia     Paranoid type delusional disorder (H)     Schizoaffective disorder, bipolar type (H)     Allergic rhinitis due to mold     Animal dander allergy     Depression with anxiety     Encounter for IUD insertion     Morbid obesity (H)     Paranoia (psychosis) (H)     Psychosis (H)     Seasonal allergic rhinitis due to pollen     Current Outpatient Medications   Medication Sig     azelastine (ASTELIN) 137 mcg (0.1 %) nasal spray 2 sprays into each nostril 2 (two) times a day.     benztropine (COGENTIN) 0.5 MG tablet Take 0.5 mg by mouth daily as needed.     buPROPion (WELLBUTRIN SR) 100 MG 12 hr tablet TAKE 1 TABLET BY MOUTH TWICE DAILY FOR 1 WEEK THEN 2 TABLETS EVERY MORNING AND 1 TABLET AT 5PM     EPINEPHrine (EPIPEN/ADRENACLICK/AUVI-Q) 0.3 mg/0.3 mL injection Inject 0.3 mg into the shoulder, thigh, or buttocks as needed.     hydrOXYzine HCL (ATARAX) 10 MG tablet Take 10 mg by mouth daily.     levonorgestrel (MIRENA) 20 mcg/24 hours (5 yrs) 52 mg IUD 20 mcg by Intrauterine route.     lurasidone (LATUDA) 80 mg Tab tablet Take 160 mg by mouth daily with supper.     melatonin 10 mg Tab Take 10 mg by mouth.     OLANZapine (ZYPREXA) 5 MG tablet Take 5 mg by mouth daily.     sertraline (ZOLOFT) 50 MG tablet Take 1 tablet by mouth daily.     VRAYLAR 1.5 mg cap capsule TAKE 1 CAPSULE BY MOUTH EVERY DAY IN THE MORNING  "      Past Surgical History  She has a past surgical history that includes Tonsillectomy and adenoidectomy.     Examination   Ht 5' 3\" (1.6 m)   Wt 210 lb (95.3 kg) Comment: pt reported  Breastfeeding No   BMI 37.20 kg/m    Height: 5' 3\" (1.6 m) (6/16/2020  9:28 AM)  Initial Weight: 202 lbs (6/16/2020  9:28 AM)  Weight: 210 lb (95.3 kg) (pt reported) (6/16/2020  9:28 AM)  Weight loss from initial: -8 (6/16/2020  9:28 AM)  % Weight loss: -3.96 % (6/16/2020  9:28 AM)  BMI (Calculated): 37.2 (6/16/2020  9:28 AM)  SpO2: 98 % (12/11/2019 12:34 PM)    General:  Alert and ambulatory, NAD  HEENT: Moist mucous membranes, neck is without LAD  Pulmonary:  Normal respiratory effort, no cough, no audible wheezes/crackles.  CV:  Regular rate and Rhythm, no murmurs  Pscyh/Mood: stable         Counseling:   We reviewed the important post op bariatric recommendations:  -eating 3 meals daily  -eating protein first, getting >60gm protein daily  -eating slowly, chewing food well  -avoiding/limiting calorie containing beverages  -limiting starchy vegetables and carbohydrates, choosing wheat, not white with breads,   crackers, pastas, marquita, bagels, tortillas, rice  -limiting restaurant or cafeteria eating to twice a week or less    We discussed the importance of restorative sleep and stress management in maintaining a healthy weight.  We discussed the National Weight Control Registry healthy weight maintenance strategies and ways to optimize metabolism.  We discussed the importance of physical activity including cardiovascular and strength training in maintaining a healthier weight.            LONDON Serna MD  [facility].    Much or all of the text in this note was generated through the use of Dragon Dictate voice-to-text software. Errors in spelling or words which seem out of context are unintentional. Sound alike errors, in particular, may have escaped editing.         Phone call duration: 17 " rahul Little

## 2021-06-09 ENCOUNTER — COMMUNICATION - HEALTHEAST (OUTPATIENT)
Dept: SCHEDULING | Facility: CLINIC | Age: 36
End: 2021-06-09

## 2021-06-09 NOTE — PROGRESS NOTES
"Maddy Ortiz is a 34 y.o. female who is being evaluated via a billable telephone visit.      The patient has been notified of following:     \"This telephone visit will be conducted via a call between you and your physician/provider. We have found that certain health care needs can be provided without the need for a physical exam.  This service lets us provide the care you need with a short phone conversation.  If a prescription is necessary we can send it directly to your pharmacy.  If lab work is needed we can place an order for that and you can then stop by our lab to have the test done at a later time.    If during the course of the call the physician/provider feels a telephone visit is not appropriate, you will not be charged for this service.\"     Maddy Ortiz complains of    Chief Complaint   Patient presents with     Nutrition Counseling       I have reviewed and updated the patient's Past Medical History, Social History, Family History and Medication List.    ALLERGIES  Animal dander; Metformin; Mold/mildew; Pollen; and Trees    Additional provider notes:     Medical  Weight Loss Follow-Up Diet Evaluation  Assessment:  Maddy is presenting today for a follow up weight management nutrition consultation. Pt has had an initial appointment with Dr. Serna  Weight loss medication: topamax.  Pt's Initial Weight: 202 lbs  Weight: 215 lb (97.5 kg) (per patient- unsure if it correct)  Weight loss from initial: -13  % Weight loss: -6.44 %    BMI: Body mass index is 38.09 kg/m .  IBW: 115-125 lbs    Estimated RMR ([place]-St Jeor equation): 1600kcal   Recommended Protein Intake: 60-80 grams of protein/day  Patient Active Problem List:  Patient Active Problem List   Diagnosis     Allergic rhinitis due to animal dander     Anxiety state     Gastroesophageal reflux disease without esophagitis     Insomnia     Paranoid type delusional disorder (H)     Schizoaffective disorder, bipolar type (H)     Allergic rhinitis due " to mold     Animal dander allergy     Depression with anxiety     Encounter for IUD insertion     Morbid obesity (H)     Paranoia (psychosis) (H)     Psychosis (H)     Seasonal allergic rhinitis due to pollen     Obesity, Class II, BMI 35-39.9, isolated (see actual BMI)     Diabetes: No     Progress on goals from last visit: patient started a new medication and feels as though this is causing weight gain. She feels as though she is not overeating due not being hungry. Patient struggles with access to food due to anxiety going grocery shopping. She is receiving mom's meals and has her groceries delivered. She is planning to go in herself again soon with her Arms worker.  1. 1 vegetable per day- is getting mom's meals, there are vegetables on this.  2. Plan/prep 3 meals per week- goal not met    Dietary Recall:  Breakfast: peanut butter sandwich- will receive mom's meals today  Lunch: corn dog and mac and cheese  Dinner:hamburger, corn and tator tots  Eating out (frequency/week): three times per month  Hydration (type/oz. per day):  Water: is drinking quite a bit of water- adding unsweetened flavoring  Drinks pop only occasionally- couple times per week  Exercise:  Routine exercise established: No  Has been trying to figure out a way to get an exercise machine- membership to [employer], doesn't like to do online classes     Nutrition Diagnosis:    Overweight/Obesity (NC 3.3) related to overeating and poor lifestyle habits as evidenced by skipping meals, frequent eating out, eating high fat and sugar foods and BMI 38.09  Not ready for diet/lifestyle change (NB 1.3) related to unsupported beliefs/attitudes about food, nutrition and nutrition-related topics as evidenced by patient's subjective statements, denial of need to change, inability to meet goals previously set      Intervention:  1. Food and/or nutrient delivery: encouraged patient to continue working to get veggies in her diet  2. Nutrition education: educated  patient on reading food labels for 10g sugar or less, 10g fat or less per serving. Encouraged her to research labels before shopping to minimize time in the store.  3. Nutrition counseling: goal setting and motivational interviewing    Monitoring/Evaluation:    Goals:  1. Read labels for 10g sugar or less and 10g fat or less  2. Exercise 2 times per week     Assessment/Plan:    Patient to follow up in 2 months(s) with bariatrician and 3 month(s) with RD    Phone call duration: 23 minutes    France Kingsley RD

## 2021-06-10 ENCOUNTER — HOSPITAL ENCOUNTER (OUTPATIENT)
Dept: PHYSICAL THERAPY | Facility: CLINIC | Age: 36
Setting detail: THERAPIES SERIES
End: 2021-06-10
Attending: NURSE PRACTITIONER
Payer: MEDICARE

## 2021-06-10 PROCEDURE — 97110 THERAPEUTIC EXERCISES: CPT | Mod: GP,59 | Performed by: PHYSICAL THERAPIST

## 2021-06-10 PROCEDURE — 90912 BFB TRAINING 1ST 15 MIN: CPT | Mod: GP | Performed by: PHYSICAL THERAPIST

## 2021-06-11 NOTE — TELEPHONE ENCOUNTER
Attempted to call x2 for telephone visit at 7:30a. LVM to call and reschedule at earliest convenience.

## 2021-06-14 ENCOUNTER — OFFICE VISIT (OUTPATIENT)
Dept: FAMILY MEDICINE | Facility: CLINIC | Age: 36
End: 2021-06-14
Payer: MEDICARE

## 2021-06-14 VITALS
HEART RATE: 106 BPM | DIASTOLIC BLOOD PRESSURE: 88 MMHG | OXYGEN SATURATION: 98 % | BODY MASS INDEX: 39.44 KG/M2 | TEMPERATURE: 98.4 F | SYSTOLIC BLOOD PRESSURE: 122 MMHG | HEIGHT: 64 IN | WEIGHT: 231 LBS | RESPIRATION RATE: 16 BRPM

## 2021-06-14 DIAGNOSIS — M25.511 ACUTE PAIN OF RIGHT SHOULDER: Primary | ICD-10-CM

## 2021-06-14 PROCEDURE — 99214 OFFICE O/P EST MOD 30 MIN: CPT | Performed by: NURSE PRACTITIONER

## 2021-06-14 RX ORDER — AZELASTINE 1 MG/ML
SPRAY, METERED NASAL
COMMUNITY
Start: 2021-04-12 | End: 2021-06-14

## 2021-06-14 RX ORDER — FLUNISOLIDE 0.25 MG/ML
SOLUTION NASAL
COMMUNITY
Start: 2021-05-12 | End: 2021-07-13

## 2021-06-14 ASSESSMENT — MIFFLIN-ST. JEOR: SCORE: 1722.81

## 2021-06-14 NOTE — PROGRESS NOTES
"    Assessment & Plan     Acute pain of right shoulder  - PHYSICAL THERAPY REFERRAL; Future             Patient Instructions   Schedule physical therapy for the shoulder.    For pain:  Take ibuprofen 600 mg every 6 hours as needed, or  Tylenol 1000 mg every 8 hours as needed.      Return in about 4 weeks (around 7/12/2021).    The risks, benefits and treatment options of prescribed medications or other treatments have been discussed with the patient. The patient verbalized their understanding and should call or follow up if no improvement or if they develop further problems.    PHILL Luo CNP  [facility]        Terry Castañeda is a 35 year old who presents for the following health issues     HPI     Musculoskeletal problem/pain  Upper right side of back   Down arm and into hand  Onset/Duration: 2 weeks  Description  Location: right arm and hand -   Joint Swelling: no  Redness: no  Pain: YES  Warmth: no  Intensity:  Mild to moderate  Progression of Symptoms:  same  Accompanying signs and symptoms:   Fevers: no  Numbness/tingling/weakness: YES- whole right hand  History  Trauma to the area: no  Recent illness:  no  Previous similar problem: no  Previous evaluation:  no  Precipitating or alleviating factors:  Aggravating factors include: exercise and overuse  Therapies tried and outcome: stretching, acetaminophen and Ibuprofen    Patient is here today asking for new orders for physical therapy.        Review of Systems   Constitutional, HEENT, cardiovascular, pulmonary, gi and gu systems are negative, except as otherwise noted.      Objective    /88   Pulse 106   Temp 98.4  F (36.9  C) (Tympanic)   Resp 16   Ht 1.626 m (5' 4\")   Wt 104.8 kg (231 lb)   SpO2 98%   BMI 39.65 kg/m    Body mass index is 39.65 kg/m .  Physical Exam   GENERAL: healthy, alert and no distress                "

## 2021-06-14 NOTE — PATIENT INSTRUCTIONS
Schedule physical therapy for the shoulder.    For pain:  Take ibuprofen 600 mg every 6 hours as needed, or  Tylenol 1000 mg every 8 hours as needed.

## 2021-06-17 NOTE — TELEPHONE ENCOUNTER
Contacted patient in regards Her Daughter's covid results, and She also requested Hers. Results were read. No further actions required.

## 2021-06-19 NOTE — LETTER
Letter by Anuja Serna MD at      Author: Anuja Serna MD Service: -- Author Type: --    Filed:  Encounter Date: 4/2/2019 Status: (Other)         4/2/2019      Maddy Ortiz  [address]  [facility] [address]      Dear Maddy,    Welcome and thank you for your interest in the bariatric program at [facility]!     Your appointment is scheduled at our [place] Office on Wednesday May 29, 2019 at 2:15 PM with Dr. LONDON Serna.  We ask that you arrive 45 minutes prior to your appointment time to complete your registration.   We strive to avoid clinic delays for our patients, so patients arriving late will need to reschedule.    Your first appointment will take about two hours.     In preparation for this appointment you will need to bring the following:      Your insurance card and photo identification    Completed health history form (enclosed).  Please make sure that you bring this form with you completed. There will not be time to complete this in the office so we will need to reschedule if you forget to do this.     All your medications in their original containers, including over-the-counter medications.    Any lab results that you have had done within the last 6 months.     If you are interested in our surgical program; call your insurance company prior to this visit, to verify that your specific insurance plan covers Weight-Loss Surgery and what your out-of-pocket costs may be.     Your appointment has been scheduled at our [place] Office- [address], [address], [place] [address].  [phone].    If you find yourself unable to keep this appointment, please call us to reschedule at your earliest convenience so we can accommodate other patients.    We are excited that you have chosen our program and look forward to serving you!    Sincerely,  [facility]

## 2021-06-22 ENCOUNTER — HOSPITAL ENCOUNTER (OUTPATIENT)
Dept: PHYSICAL THERAPY | Facility: CLINIC | Age: 36
Setting detail: THERAPIES SERIES
End: 2021-06-22
Attending: NURSE PRACTITIONER
Payer: MEDICARE

## 2021-06-22 DIAGNOSIS — M25.511 ACUTE PAIN OF RIGHT SHOULDER: ICD-10-CM

## 2021-06-22 PROCEDURE — 97110 THERAPEUTIC EXERCISES: CPT | Mod: GP | Performed by: PHYSICAL THERAPIST

## 2021-06-22 PROCEDURE — 97012 MECHANICAL TRACTION THERAPY: CPT | Mod: GP | Performed by: PHYSICAL THERAPIST

## 2021-06-22 PROCEDURE — 97161 PT EVAL LOW COMPLEX 20 MIN: CPT | Mod: GP | Performed by: PHYSICAL THERAPIST
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
## 2021-06-22 NOTE — PROGRESS NOTES
[facility]          OUTPATIENT PHYSICAL THERAPY ORTHOPEDIC EVALUATION  PLAN OF TREATMENT FOR OUTPATIENT REHABILITATION  (COMPLETE FOR INITIAL CLAIMS ONLY)  Patient's Last Name, First Name, M.I.  YOB: 1985  Maddy Ortiz    Provider s Name:  [facility]   Medical Record No.  [id number]   Start of Care Date:  06/22/21   Onset Date:  01/01/17   Type:     _X__PT   ___OT   ___SLP Medical Diagnosis:  (P) acute pain of right shoulder     PT Diagnosis:  muscle tightness and radiating right arm pain    Visits from SOC:  1      _________________________________________________________________________________  Plan of Treatment/Functional Goals:  joint mobilization, manual therapy, ROM, strengthening, stretching, transfer training, neuromuscular re-education     Traction     Goals  Goal Identifier: (P) 1  Goal Description: (P) Patient will report no numbness into the right hand.   Target Date: (P) 07/20/21    Goal Identifier: (P) 2  Goal Description: (P) Patient will be independent with her HEP for strengthening outside of PT.   Target Date: (P) 07/20/21    Goal Identifier: (P) 3  Goal Description: (P) Patient will be able to complete household chores with pain staying below a 3/10 in the neck/right arm.   Target Date: (P) 08/17/21    Goal Identifier: (P) 4  Goal Description: (P) Patient will be able to lay on her side with no right shoulder/arm pain.   Target Date: (P) 08/17/21              Therapy Frequency:  2 times/Week  Predicted Duration of Therapy Intervention:  8 weeks    Lenka Galan, PT                 I CERTIFY THE NEED FOR THESE SERVICES FURNISHED UNDER        THIS PLAN OF TREATMENT AND WHILE UNDER MY CARE     (Physician co-signature of this document indicates review and certification of the therapy plan).                       Certification Date From:  (P) 06/22/21   Certification Date To:  (P) 08/17/21    Referring Provider:  Shirley  Pete     Initial Assessment        See Epic Evaluation Start of Care Date: 06/22/21

## 2021-06-25 NOTE — TELEPHONE ENCOUNTER
"I am in a lot of pain because of anxiety. I feel physical pain. When I was a teenager I felt the same. It is difficult for me. I am going to try and talk to someone tomorrow. I have a therapist @. [facility]. (last talked to her 2pm today).   Caller was crying at beginning of this call. She states she has pain located all over my body, I don't want to move. It started around 9pm tonight. It is very intense right now. Pain currently =\"9\". It gets worse when I focus on it, or think about feeling sad, or worries\". She says that in the past she usually takes a sleeping pill. She wants to know if there is a way she could take something stronger without going to the hospital, something that would relax her?   She has not discussed this with her therapist yet.   She states she started getting these pains when she had conflict with other people in High School.    She lives with a daughter= 12 yrs old.     She states she took her medications: Sertraline 200mg and Vraylar 6mg, both are daily in the evening, prescribed for schizoaffective disorder, paranoia and depression.   She thinks a warm bath may help relax her muscles. She states she feels more relaxed. She wonders if she was having an anxiety attack? The last time it happened was when her daughter was a baby, it woke her up..   She takes Melatonin for sleep and is going to take that now. At the end of the call she states she now feels tired, the pain is going away, and she is going to go to sleep.   PCP Dr ANA Freeman @ [facility].    Triaged to a disposition of see PCP within 24 hrs. Patient intends to call her therapist in the morning and discuss what happened tonight with her. Discussed option of calling oncall provider for her therapist if needed tonight.    Flor Nguyen RN Triage Nurse Advisor 10:52 PM 6/9/2021    Reason for Disposition    Patient sounds very upset or troubled to the triager    Additional Information    Negative: Severe difficulty " breathing (e.g., struggling for each breath, speaks in single words)    Negative: Bluish (or gray) lips or face now    Negative: Difficult to awaken or acting confused (e.g., disoriented, slurred speech)    Negative: Hysterical or combative behavior    Negative: Sounds like a life-threatening emergency to the triager    Negative: Chest pain    Negative: Palpitations, skipped heart beat, or rapid heart beat    Negative: Cough is main symptom    Negative: Suicide thoughts, threats, attempts, or questions    Negative: Depression is main problem or symptom (e.g., feelings of sadness or hopelessness)    Negative: [1] Difficulty breathing AND [2] persists > 10 minutes AND [3] not relieved by reassurance provided by triager    Negative: [1] Lightheadedness or dizziness AND [2] persists > 10 minutes AND [3] not relieved by reassurance provided by triager    Negative: [1] Alcohol or drug abuse, known or suspected AND [2] feeling very shaky (i.e., visible tremors of hands)    Negative: Patient sounds very sick or weak to the triager    Negative: Symptoms interfere with work or school    Negative: Requesting to talk to a counselor (e.g., mental health worker, psychiatrist)    Protocols used: ANXIETY AND PANIC ATTACK-A-AH  COVID 19 Nurse Triage Plan/Patient Instructions    Please be aware that novel coronavirus (COVID-19) may be circulating in the community. If you develop symptoms such as fever, cough, or SOB or if you have concerns about the presence of another infection including coronavirus (COVID-19), please contact your health care provider or visit  [email or web address].    Disposition/Instructions    In-Person Visit with provider recommended. Reference Visit Selection Guide.    Thank you for taking steps to prevent the spread of this virus.  o Limit your contact with others.  o Wear a simple mask to cover your cough.  o Wash your hands well and often.    Resources    [facility]: About COVID-19:  [email or web address]    CDC: What to Do If You're Sick: www.cdc.gov/coronavirus/2019-ncov/about/steps-when-sick.html    CDC: Ending Home Isolation: www.cdc.gov/coronavirus/2019-ncov/hcp/disposition-in-home-patients.html     CDC: Caring for Someone: www.cdc.gov/coronavirus/2019-ncov/if-you-are-sick/care-for-someone.html     [facility]: Interim Guidance for Hospital Discharge to Home: [email or web address]    [facility] clinical trials (COVID-19 research studies): [email or web address]-clinical-trials     Below are the COVID-19 hotlines at the [place] Department of Health ([facility]). Interpreters are available.   o For health questions: Call 078-356-1452 or 1-753.445.2332 (7 a.m. to 7 p.m.)  For questions about schools and childcare: Call 069-255-8514 or 1-450.461.2947 (7 a.m. to 7 p.m.)

## 2021-06-28 ENCOUNTER — HOSPITAL ENCOUNTER (OUTPATIENT)
Dept: PHYSICAL THERAPY | Facility: CLINIC | Age: 36
Setting detail: THERAPIES SERIES
End: 2021-06-28
Attending: NURSE PRACTITIONER
Payer: MEDICARE

## 2021-06-28 PROCEDURE — 97012 MECHANICAL TRACTION THERAPY: CPT | Mod: GP | Performed by: PHYSICAL THERAPIST

## 2021-07-06 ENCOUNTER — NURSE TRIAGE (OUTPATIENT)
Dept: NURSING | Facility: CLINIC | Age: 36
End: 2021-07-06

## 2021-07-06 NOTE — TELEPHONE ENCOUNTER
Pt called in states she has cold symptom.  Pt got 2 covid-19 shots.  Pt has runny nose.  Has cough.  The symptom started 4 days ago.  Pt states she feel  breathing difficulty when she go upstair.  No difficulty breathing at rest or normal walk.  Pt has history of asthma.  There is not fever.  Has little sore throat.  Pt is not pregnant.  The disposition is to be seen at the ED or PCP triage.  Phone appointment is made for today in 1 hour.  Care advice given per protocol.  Patient agrees with care advice given.   Agreed to call back if he has additional symptoms or questions.      Lauro King [place] Nurse Advisor 7/6/2021 5:00 PM      Reason for Disposition    [1] Difficulty breathing AND [2] not severe AND [3] not from stuffy nose (e.g., not relieved by cleaning out the nose)    Additional Information    Negative: Severe difficulty breathing (e.g., struggling for each breath, speaks in single words)    Negative: Sounds like a life-threatening emergency to the triager    Negative: Runny nose is caused by pollen or other allergies    Negative: Cough is main symptom    Negative: Severe sore throat    Negative: Fever > 104 F (40 C)    Protocols used: COMMON COLD-A-

## 2021-07-07 DIAGNOSIS — R05.9 COUGH: Primary | ICD-10-CM

## 2021-07-07 DIAGNOSIS — R05.9 COUGH: ICD-10-CM

## 2021-07-07 LAB
SARS-COV-2 RNA RESP QL NAA+PROBE: NORMAL
SPECIMEN SOURCE: NORMAL

## 2021-07-07 PROCEDURE — U0005 INFEC AGEN DETEC AMPLI PROBE: HCPCS | Performed by: PHYSICIAN ASSISTANT

## 2021-07-07 PROCEDURE — U0003 INFECTIOUS AGENT DETECTION BY NUCLEIC ACID (DNA OR RNA); SEVERE ACUTE RESPIRATORY SYNDROME CORONAVIRUS 2 (SARS-COV-2) (CORONAVIRUS DISEASE [COVID-19]), AMPLIFIED PROBE TECHNIQUE, MAKING USE OF HIGH THROUGHPUT TECHNOLOGIES AS DESCRIBED BY CMS-2020-01-R: HCPCS | Performed by: PHYSICIAN ASSISTANT

## 2021-07-08 ENCOUNTER — TELEPHONE (OUTPATIENT)
Dept: ALLERGY | Facility: CLINIC | Age: 36
End: 2021-07-08

## 2021-07-08 LAB
LABORATORY COMMENT REPORT: NORMAL
SARS-COV-2 RNA RESP QL NAA+PROBE: NEGATIVE
SPECIMEN SOURCE: NORMAL

## 2021-07-12 ENCOUNTER — HOSPITAL ENCOUNTER (INPATIENT)
Facility: CLINIC | Age: 36
LOS: 8 days | Discharge: HOME | DRG: 885 | End: 2021-07-21
Attending: EMERGENCY MEDICINE | Admitting: PSYCHIATRY & NEUROLOGY
Payer: MEDICARE

## 2021-07-12 DIAGNOSIS — F41.1 GENERALIZED ANXIETY DISORDER: ICD-10-CM

## 2021-07-12 DIAGNOSIS — R45.851 SUICIDE IDEATION: ICD-10-CM

## 2021-07-12 DIAGNOSIS — G47.00 INSOMNIA, UNSPECIFIED TYPE: Primary | ICD-10-CM

## 2021-07-12 DIAGNOSIS — F25.0 SCHIZOAFFECTIVE DISORDER, BIPOLAR TYPE (H): ICD-10-CM

## 2021-07-12 DIAGNOSIS — F31.9 BIPOLAR 1 DISORDER (H): ICD-10-CM

## 2021-07-12 DIAGNOSIS — K21.9 GASTROESOPHAGEAL REFLUX DISEASE WITHOUT ESOPHAGITIS: ICD-10-CM

## 2021-07-12 DIAGNOSIS — Z11.52 ENCOUNTER FOR SCREENING LABORATORY TESTING FOR SEVERE ACUTE RESPIRATORY SYNDROME CORONAVIRUS 2 (SARS-COV-2): ICD-10-CM

## 2021-07-12 PROCEDURE — 99284 EMERGENCY DEPT VISIT MOD MDM: CPT | Performed by: EMERGENCY MEDICINE

## 2021-07-12 PROCEDURE — 99285 EMERGENCY DEPT VISIT HI MDM: CPT | Mod: 25 | Performed by: EMERGENCY MEDICINE

## 2021-07-12 PROCEDURE — C9803 HOPD COVID-19 SPEC COLLECT: HCPCS | Performed by: EMERGENCY MEDICINE

## 2021-07-13 ENCOUNTER — TELEPHONE (OUTPATIENT)
Dept: BEHAVIORAL HEALTH | Facility: CLINIC | Age: 36
End: 2021-07-13

## 2021-07-13 PROBLEM — F25.9 SCHIZOAFFECTIVE DISORDER (H): Status: ACTIVE | Noted: 2021-07-13

## 2021-07-13 LAB
HCG UR QL: NEGATIVE
SARS-COV-2 RNA RESP QL NAA+PROBE: NEGATIVE

## 2021-07-13 PROCEDURE — 81025 URINE PREGNANCY TEST: CPT | Performed by: EMERGENCY MEDICINE

## 2021-07-13 PROCEDURE — 250N000013 HC RX MED GY IP 250 OP 250 PS 637: Performed by: STUDENT IN AN ORGANIZED HEALTH CARE EDUCATION/TRAINING PROGRAM

## 2021-07-13 PROCEDURE — 250N000013 HC RX MED GY IP 250 OP 250 PS 637: Performed by: EMERGENCY MEDICINE

## 2021-07-13 PROCEDURE — U0003 INFECTIOUS AGENT DETECTION BY NUCLEIC ACID (DNA OR RNA); SEVERE ACUTE RESPIRATORY SYNDROME CORONAVIRUS 2 (SARS-COV-2) (CORONAVIRUS DISEASE [COVID-19]), AMPLIFIED PROBE TECHNIQUE, MAKING USE OF HIGH THROUGHPUT TECHNOLOGIES AS DESCRIBED BY CMS-2020-01-R: HCPCS | Performed by: EMERGENCY MEDICINE

## 2021-07-13 PROCEDURE — 124N000002 HC R&B MH UMMC

## 2021-07-13 PROCEDURE — 90791 PSYCH DIAGNOSTIC EVALUATION: CPT

## 2021-07-13 PROCEDURE — H2032 ACTIVITY THERAPY, PER 15 MIN: HCPCS

## 2021-07-13 RX ORDER — ACETAMINOPHEN 325 MG/1
650 TABLET ORAL EVERY 4 HOURS PRN
Status: DISCONTINUED | OUTPATIENT
Start: 2021-07-13 | End: 2021-07-21 | Stop reason: HOSPADM

## 2021-07-13 RX ORDER — OLANZAPINE 5 MG/1
5 TABLET, ORALLY DISINTEGRATING ORAL ONCE
Status: COMPLETED | OUTPATIENT
Start: 2021-07-13 | End: 2021-07-13

## 2021-07-13 RX ORDER — LANOLIN ALCOHOL/MO/W.PET/CERES
3 CREAM (GRAM) TOPICAL
Status: DISCONTINUED | OUTPATIENT
Start: 2021-07-13 | End: 2021-07-21 | Stop reason: HOSPADM

## 2021-07-13 RX ORDER — ALBUTEROL SULFATE 90 UG/1
2 AEROSOL, METERED RESPIRATORY (INHALATION) EVERY 4 HOURS PRN
Status: DISCONTINUED | OUTPATIENT
Start: 2021-07-13 | End: 2021-07-21 | Stop reason: HOSPADM

## 2021-07-13 RX ORDER — BENZTROPINE MESYLATE 0.5 MG/1
0.5 TABLET ORAL DAILY PRN
Status: DISCONTINUED | OUTPATIENT
Start: 2021-07-13 | End: 2021-07-21 | Stop reason: HOSPADM

## 2021-07-13 RX ORDER — MAGNESIUM HYDROXIDE/ALUMINUM HYDROXICE/SIMETHICONE 120; 1200; 1200 MG/30ML; MG/30ML; MG/30ML
30 SUSPENSION ORAL EVERY 4 HOURS PRN
Status: DISCONTINUED | OUTPATIENT
Start: 2021-07-13 | End: 2021-07-21 | Stop reason: HOSPADM

## 2021-07-13 RX ORDER — LACTOBACILLUS RHAMNOSUS GG 10B CELL
1 CAPSULE ORAL DAILY
COMMUNITY
End: 2021-11-30

## 2021-07-13 RX ORDER — OLANZAPINE 10 MG/1
10 TABLET ORAL 3 TIMES DAILY PRN
Status: DISCONTINUED | OUTPATIENT
Start: 2021-07-13 | End: 2021-07-21 | Stop reason: HOSPADM

## 2021-07-13 RX ORDER — LACTOBACILLUS RHAMNOSUS GG 10B CELL
1 CAPSULE ORAL DAILY
Status: DISCONTINUED | OUTPATIENT
Start: 2021-07-14 | End: 2021-07-21 | Stop reason: HOSPADM

## 2021-07-13 RX ORDER — POLYETHYLENE GLYCOL 3350 17 G/17G
17 POWDER, FOR SOLUTION ORAL DAILY PRN
Status: DISCONTINUED | OUTPATIENT
Start: 2021-07-13 | End: 2021-07-21 | Stop reason: HOSPADM

## 2021-07-13 RX ORDER — OLANZAPINE 10 MG/2ML
10 INJECTION, POWDER, FOR SOLUTION INTRAMUSCULAR 3 TIMES DAILY PRN
Status: DISCONTINUED | OUTPATIENT
Start: 2021-07-13 | End: 2021-07-21 | Stop reason: HOSPADM

## 2021-07-13 RX ORDER — HYDROXYZINE HYDROCHLORIDE 25 MG/1
25 TABLET, FILM COATED ORAL EVERY 4 HOURS PRN
Status: DISCONTINUED | OUTPATIENT
Start: 2021-07-13 | End: 2021-07-21 | Stop reason: HOSPADM

## 2021-07-13 RX ADMIN — MELATONIN TAB 3 MG 3 MG: 3 TAB at 21:35

## 2021-07-13 RX ADMIN — OLANZAPINE 5 MG: 5 TABLET, ORALLY DISINTEGRATING ORAL at 01:30

## 2021-07-13 RX ADMIN — OLANZAPINE 5 MG: 5 TABLET, ORALLY DISINTEGRATING ORAL at 13:24

## 2021-07-13 RX ADMIN — OLANZAPINE 10 MG: 10 TABLET, FILM COATED ORAL at 21:35

## 2021-07-13 RX ADMIN — CARIPRAZINE 6 MG: 1.5 CAPSULE, GELATIN COATED ORAL at 20:52

## 2021-07-13 ASSESSMENT — ENCOUNTER SYMPTOMS
AGITATION: 0
ARTHRALGIAS: 0
NECK STIFFNESS: 0
CONFUSION: 0
ABDOMINAL PAIN: 0
EYE REDNESS: 0
SHORTNESS OF BREATH: 0
BRUISES/BLEEDS EASILY: 0
COLOR CHANGE: 0
DIFFICULTY URINATING: 0
HEADACHES: 0
FEVER: 0
HALLUCINATIONS: 0

## 2021-07-13 ASSESSMENT — ACTIVITIES OF DAILY LIVING (ADL)
HEARING_DIFFICULTY_OR_DEAF: NO
DOING_ERRANDS_INDEPENDENTLY_DIFFICULTY: YES
TOILETING_ISSUES: NO
WALKING_OR_CLIMBING_STAIRS_DIFFICULTY: NO
FALL_HISTORY_WITHIN_LAST_SIX_MONTHS: NO
DRESSING/BATHING_DIFFICULTY: NO
WEAR_GLASSES_OR_BLIND: YES
DIFFICULTY_EATING/SWALLOWING: NO
CONCENTRATING,_REMEMBERING_OR_MAKING_DECISIONS_DIFFICULTY: NO
DIFFICULTY_COMMUNICATING: NO

## 2021-07-13 ASSESSMENT — MIFFLIN-ST. JEOR: SCORE: 1699.22

## 2021-07-13 NOTE — PLAN OF CARE
"  Problem: General Rehab Plan of Care  Goal: Therapeutic Recreation/Music Therapy Goal  Description: The patient and/or their representative will achieve their patient-specific goals related to the plan of care.  The patient-specific goals include:  Outcome: No Change     Pt reported feeling \"frustrated\" and feeling physical and emotional discomfort. The focus of the group was on balancing change an acceptance. Music therapist sang a song about acceptance and change. Patients chose between drawing with markers to increase focus and control or drawing with paint sticks to increase acceptance. Pt talked about \"accepting mortality\" and making big changes in her life such as quitting her job. Pt left group early due to a headache.   "

## 2021-07-13 NOTE — PLAN OF CARE
"Patient admitted voluntarily from [place] [facility] to [facility]. Patient states reason for admission was, \"I was feeling paranoid.\" States she \"wants a better life for my daughter.\" Daughter currently staying with her brother, Alexander Ortiz (TIMOTHY signed). Patient stated she had considered \"moving to a group home and having Alexander raise Leonora, she needs a life without fighting and chaos.\"     During admission interview patient is tangential, often not answering the question asked. Did not make eye contact with writer and stared at wall. On admission patient denies SI, SIB, HI and psychotic symptoms. States she feels safe on the unit. Per EMR, patient has history of SA by overdose. Patient was cooperative with admission process.     States when she gets frustrated she \"complains a lot\" and the unit can help her manage this by \"taking PRNs.\"     Patient squinting frequently, states she has poor vision and wears glasses but does not know where they are.     Patient states she has a cough for one week and eating poorly because of this. States she has had two negative Covid-19 results since last week and received both doses of Pzifer vaccine (as reflected in EMR).     Oriented to unit. Provided and reviewed admission folder.   "

## 2021-07-13 NOTE — ED PROVIDER NOTES
"ED Provider Note  [facility]      History     Chief Complaint   Patient presents with     Paranoid     \"I'm suffering from paranoid delusions and I feel like I might risk hurting myself.\"     Suicidal     \"I'm worried I might hurt myself and I become so apathetic whether I die or not and I don't trust that I wouldn't do it.\"     The history is provided by the patient and medical records.     Maddy Ortiz is a 35 year old female with PMH notable for schizoaffective disorder, bipolar type and AYAKA who presents to the ED for paranoia and suicidal ideation. Patient reports that she is feeling increasingly manic recently. She is more talkative, bubbly, not sleeping and up most of the night, and does not eat well. She states that she has been taking all of her medications, but missed one last night. She states that she has been having difficulties reaching her therapist recently. She has been trying to reach her because she feels that her Sertraline is too high which is causing her to be more manic, but her therapist told her she did not want to adjust her meds last time they spoke. She states that she does not have auditory or visual hallucinations, but feels that she disconnects to reality. She feels that this is a constant feeling, but she is more unstable recently than is typical for her. She states that she is having paranoid delusions and misperceptions and is concerned that she may hurt herself or someone else on accident. She has no specific plan, but is worried because she has no safety plan in place. She states that today her brother called her because he saw that she was talking to \"Jay Jay Flynn\" on social media and they had an agreement that she was no longer going to talk to this person because they do not get along well and their disagreements have affected her family in the past. She states that she had a feeling that there was an \"emergency\" today and she became very tense, " frustrated, and raised her voice at her daughter, telling her to lock herself in her room and pack her things until the patient could be evaluated. She states that her daughter is now with her brother. The patient feels safe here in the ED. She denies substance use.     Past Medical History  Past Medical History:   Diagnosis Date     Bipolar 1 disorder (H) 12/6/2012     Depressive disorder      Paranoid type delusional disorder (H) 11/14/2012     Past Surgical History:   Procedure Laterality Date     TONSILLECTOMY & ADENOIDECTOMY      as a child     TONSILLECTOMY & ADENOIDECTOMY       albuterol (PROAIR HFA/PROVENTIL HFA/VENTOLIN HFA) 108 (90 Base) MCG/ACT inhaler  benztropine (COGENTIN) 0.5 MG tablet  EPINEPHrine (EPIPEN/ADRENACLICK/OR ANY BX GENERIC EQUIV) 0.3 MG/0.3ML injection 2-pack  flunisolide (NASALIDE) 25 MCG/ACT (0.025%) SOLN spray  fluticasone (FLONASE) 50 MCG/ACT nasal spray  hydrOXYzine (ATARAX) 10 MG tablet  levonorgestrel-ethinyl estradiol (AVIANE) 0.1-20 MG-MCG tablet  Melatonin 10 MG TABS tablet  ORDER FOR ALLERGEN IMMUNOTHERAPY  ORDER FOR ALLERGEN IMMUNOTHERAPY  ORDER FOR ALLERGEN IMMUNOTHERAPY  ORDER FOR ALLERGEN IMMUNOTHERAPY  Pseudoephedrine HCl (SUDAFED PO)  sertraline (ZOLOFT) 100 MG tablet  terbinafine (LAMISIL AT) 1 % external cream  VRAYLAR 6 MG CAPS capsule      Allergies   Allergen Reactions     Animal Dander      Other reaction(s): *Unknown     Metformin Fatigue     Mold      Other reaction(s): Runny Nose     Trees      Family History  Family History   Adopted: Yes   Problem Relation Age of Onset     Unknown/Adopted Mother      Unknown/Adopted Father      Unknown/Adopted Other      Hypertension Sister      Social History   Social History     Tobacco Use     Smoking status: Never Smoker     Smokeless tobacco: Never Used     Tobacco comment: around 2nd hand smoke   Substance Use Topics     Alcohol use: Not Currently     Comment: rare wine ( very rare)     Drug use: No      Past medical  history, past surgical history, medications, allergies, family history, and social history were reviewed with the patient. No additional pertinent items.       Review of Systems   Constitutional: Negative for fever.   HENT: Negative for congestion.    Eyes: Negative for redness.   Respiratory: Negative for shortness of breath.    Cardiovascular: Negative for chest pain.   Gastrointestinal: Negative for abdominal pain.   Endocrine: Negative for polyuria.   Genitourinary: Negative for difficulty urinating.   Musculoskeletal: Negative for arthralgias and neck stiffness.   Skin: Negative for color change.   Allergic/Immunologic: Negative for immunocompromised state.   Neurological: Negative for headaches.   Hematological: Does not bruise/bleed easily.   Psychiatric/Behavioral: Positive for behavioral problems (manic). Negative for agitation, confusion, hallucinations and self-injury.       Physical Exam   BP: (!) 150/105  Pulse: 107  Temp: 98.6  F (37  C)  Resp: 16  SpO2: 100 %  Physical Exam  General: Afebrile, no acute distress   HEENT: Normocephalic, atraumatic, conjunctivae normal. MMM  Neck: non-tender, supple  Cardio: regular rate. regular rhythm   Resp: Normal work of breathing, no respiratory distress, lungs clear bilaterally, no wheezing, rhonchi, rales  Chest/Back: no visual signs of trauma, no CVA tenderness   Abdomen: soft, non distension, no tenderness, no peritoneal signs   Neuro: alert and fully oriented. CN II-XII grossly intact. Grossly normal strength and sensation in all extremities.   MSK: no deformities. Normal range of motion  Integumentary/Skin: no rash visualized, normal color  Psych: poor eye contact, pressured speech, tangential thought, passive suicide ideation, no plan to self harm, no homicidal ideation, +reports paranoid delusions     ED Course      Procedures       No results found for any visits on 07/12/21.  Medications - No data to display     Assessments & Plan (with Medical Decision  Making)     Maddy Ortiz is a 35 year old female with PMH notable for schizoaffective disorder, bipolar type and AYAKA who presents to the ED for paranoia and suicidal ideation.  Upon arrival patient is well-appearing, afebrile, no distress.  Patient hypertensive with blood pressure 150/105, mildly tachycardic with heart rate 107, oxygen 100% on room air.  Patient here for mental health evaluation with concern for paranoid delusion, manic episode, and some passive suicidal ideation.    Patient signed out to morning provider pending behavioral health evaluation and disposition.    I have reviewed the nursing notes. I have reviewed the findings, diagnosis, plan and need for follow up with the patient.    New Prescriptions    No medications on file       Final diagnoses:   None   I, Kristi Arroyo, am serving as a trained medical scribe to document services personally performed by Faustina Mirza MD, based on the provider's statements to me.     I, Faustina Mirza MD, was physically present and have reviewed and verified the accuracy of this note documented by Kristi Arroyo.      --  Faustina Mirza MD  [facility] EMERGENCY DEPARTMENT  7/12/2021     Faustina Mirza MD  07/13/21 0718

## 2021-07-13 NOTE — TELEPHONE ENCOUNTER
S:  35Y/Female presents to [facility] ED BIB brother with SI and intent, but has no plan.     B:  Pt presents to the ED reporting SI with strong intent.   Pt does not feel she can keep herself safe.  Pt further reports delirium/paranoia and states not eating or sleeping.   Pt has a hx of Schizoaffective DO; Schizophrenia; Bipolar, and anxiety.  Pt last  Hospitalization was [place] 2 years ago.  Pt has a hx of 5 suicide attemots via tylenol overdose.  Pt denies substance use.  Pt does have both a psychiatrist and therapist in the community.       A:  Vol    R:  Patient cleared and ready for behavioral bed placement: Yes     Pt placed on Adult worklist pending bed availability.

## 2021-07-13 NOTE — PHARMACY-ADMISSION MEDICATION HISTORY
Admission Medication History Completed by Pharmacy    See [facility] Admission Navigator for allergy information, preferred outpatient pharmacy, prior to admission medications and immunization status.     Medication history sources:  patient interview, [employer]    Pertinent changes made to PTA medication list:  Added:   - probiotic  Deleted:   - nasal sprays  Changed: N/A    Additional medication history information:   - Patient denies taking any additional Rx/OTC medications other than the ones listed below.    Prior to Admission medications    Medication Sig Last Dose Taking? Auth Provider   albuterol (PROAIR HFA/PROVENTIL HFA/VENTOLIN HFA) 108 (90 Base) MCG/ACT inhaler Inhale 2 puffs into the lungs every 4 hours as needed for wheezing or shortness of breath / dyspnea PRN Yes Rudy Shin MD   benztropine (COGENTIN) 0.5 MG tablet Take 0.5 mg by mouth daily as needed  PRN Yes Reported, Patient   EPINEPHrine (EPIPEN/ADRENACLICK/OR ANY BX GENERIC EQUIV) 0.3 MG/0.3ML injection 2-pack Inject 0.3 mLs (0.3 mg) into the muscle as needed for anaphylaxis PRN Yes Arleth Arizmendi MD   hydrOXYzine (ATARAX) 10 MG tablet Take 1 tablet (10 mg) by mouth daily as needed for itching (nasal al,lergy symptoms) PRN Yes Rudy Shin MD   lactobacillus rhamnosus, GG, (CULTURELL) capsule Take 1 capsule by mouth daily Past Week Yes Unknown, Entered By History   levonorgestrel-ethinyl estradiol (AVIANE) 0.1-20 MG-MCG tablet Take 1 tablet by mouth daily Past Week Yes Shirley Freeman, PHILL CNP   Melatonin 10 MG TABS tablet Take 10 mg by mouth nightly as needed for sleep  Yes Reported, Patient   sertraline (ZOLOFT) 100 MG tablet Take 200 mg by mouth every evening Past Week Yes Reported, Patient   VRAYLAR 6 MG CAPS capsule Take 6 mg by mouth At Bedtime  Past Week Yes Reported, Patient     Date completed: 07/13/21    Medication history completed by:   Fletcher Welsh, PharmD, BCPS  [facility]  Department: [facility] *[id number]

## 2021-07-13 NOTE — TELEPHONE ENCOUNTER
R:  Patient cleared and ready for behavioral bed placement: Yes     Temo paged at 1:47pm to present for 22/Temo Plascencia called back at 1:51pm and accepted Pt.  Pt placed in queue and unit called with disposition at 1:54pm  ED Updated with placedment at 1:55pm

## 2021-07-14 LAB
ALBUMIN SERPL-MCNC: 3.9 G/DL (ref 3.4–5)
ALP SERPL-CCNC: 80 U/L (ref 40–150)
ALT SERPL W P-5'-P-CCNC: 59 U/L (ref 0–50)
ANION GAP SERPL CALCULATED.3IONS-SCNC: 6 MMOL/L (ref 3–14)
AST SERPL W P-5'-P-CCNC: 45 U/L (ref 0–45)
BASOPHILS # BLD AUTO: 0.1 10E3/UL (ref 0–0.2)
BASOPHILS NFR BLD AUTO: 1 %
BILIRUB SERPL-MCNC: 0.3 MG/DL (ref 0.2–1.3)
BUN SERPL-MCNC: 16 MG/DL (ref 7–30)
CALCIUM SERPL-MCNC: 9.2 MG/DL (ref 8.5–10.1)
CHLORIDE BLD-SCNC: 106 MMOL/L (ref 94–109)
CHOLEST SERPL-MCNC: 235 MG/DL
CO2 SERPL-SCNC: 24 MMOL/L (ref 20–32)
CREAT SERPL-MCNC: 0.85 MG/DL (ref 0.52–1.04)
DEPRECATED CALCIDIOL+CALCIFEROL SERPL-MC: 42 UG/L (ref 20–75)
EOSINOPHIL # BLD AUTO: 0.4 10E3/UL (ref 0–0.7)
EOSINOPHIL NFR BLD AUTO: 4 %
ERYTHROCYTE [DISTWIDTH] IN BLOOD BY AUTOMATED COUNT: 12 % (ref 10–15)
FASTING STATUS PATIENT QL REPORTED: YES
GFR SERPL CREATININE-BSD FRML MDRD: 88 ML/MIN/1.73M2
GLUCOSE BLD-MCNC: 111 MG/DL (ref 70–99)
HCT VFR BLD AUTO: 40.4 % (ref 35–47)
HDLC SERPL-MCNC: 34 MG/DL
HGB BLD-MCNC: 13.6 G/DL (ref 11.7–15.7)
IMM GRANULOCYTES # BLD: 0.1 10E3/UL
IMM GRANULOCYTES NFR BLD: 1 %
LDLC SERPL CALC-MCNC: 130 MG/DL
LYMPHOCYTES # BLD AUTO: 2.9 10E3/UL (ref 0.8–5.3)
LYMPHOCYTES NFR BLD AUTO: 28 %
MCH RBC QN AUTO: 29.6 PG (ref 26.5–33)
MCHC RBC AUTO-ENTMCNC: 33.7 G/DL (ref 31.5–36.5)
MCV RBC AUTO: 88 FL (ref 78–100)
MONOCYTES # BLD AUTO: 0.7 10E3/UL (ref 0–1.3)
MONOCYTES NFR BLD AUTO: 7 %
NEUTROPHILS # BLD AUTO: 6.1 10E3/UL (ref 1.6–8.3)
NEUTROPHILS NFR BLD AUTO: 59 %
NONHDLC SERPL-MCNC: 201 MG/DL
NRBC # BLD AUTO: 0 10E3/UL
NRBC BLD AUTO-RTO: 0 /100
PLATELET # BLD AUTO: 266 10E3/UL (ref 150–450)
POTASSIUM BLD-SCNC: 4.2 MMOL/L (ref 3.4–5.3)
PROT SERPL-MCNC: 7.7 G/DL (ref 6.8–8.8)
RBC # BLD AUTO: 4.59 10E6/UL (ref 3.8–5.2)
SODIUM SERPL-SCNC: 136 MMOL/L (ref 133–144)
TRIGL SERPL-MCNC: 356 MG/DL
TSH SERPL DL<=0.005 MIU/L-ACNC: 3.56 MU/L (ref 0.4–4)
WBC # BLD AUTO: 10.2 10E3/UL (ref 4–11)

## 2021-07-14 PROCEDURE — 80053 COMPREHEN METABOLIC PANEL: CPT | Performed by: STUDENT IN AN ORGANIZED HEALTH CARE EDUCATION/TRAINING PROGRAM

## 2021-07-14 PROCEDURE — 250N000013 HC RX MED GY IP 250 OP 250 PS 637: Performed by: STUDENT IN AN ORGANIZED HEALTH CARE EDUCATION/TRAINING PROGRAM

## 2021-07-14 PROCEDURE — 80061 LIPID PANEL: CPT | Performed by: STUDENT IN AN ORGANIZED HEALTH CARE EDUCATION/TRAINING PROGRAM

## 2021-07-14 PROCEDURE — 84443 ASSAY THYROID STIM HORMONE: CPT | Performed by: STUDENT IN AN ORGANIZED HEALTH CARE EDUCATION/TRAINING PROGRAM

## 2021-07-14 PROCEDURE — 85025 COMPLETE CBC W/AUTO DIFF WBC: CPT | Performed by: STUDENT IN AN ORGANIZED HEALTH CARE EDUCATION/TRAINING PROGRAM

## 2021-07-14 PROCEDURE — 36415 COLL VENOUS BLD VENIPUNCTURE: CPT | Performed by: PSYCHIATRY & NEUROLOGY

## 2021-07-14 PROCEDURE — 99222 1ST HOSP IP/OBS MODERATE 55: CPT | Mod: AI | Performed by: PSYCHIATRY & NEUROLOGY

## 2021-07-14 PROCEDURE — 124N000002 HC R&B MH UMMC

## 2021-07-14 PROCEDURE — 82306 VITAMIN D 25 HYDROXY: CPT | Performed by: PSYCHIATRY & NEUROLOGY

## 2021-07-14 PROCEDURE — 250N000013 HC RX MED GY IP 250 OP 250 PS 637

## 2021-07-14 RX ORDER — LITHIUM CARBONATE 300 MG/1
600 TABLET, FILM COATED, EXTENDED RELEASE ORAL AT BEDTIME
Status: DISCONTINUED | OUTPATIENT
Start: 2021-07-14 | End: 2021-07-15

## 2021-07-14 RX ORDER — LEVONORGESTREL/ETHIN.ESTRADIOL 0.1-0.02MG
1 TABLET ORAL DAILY
Status: DISCONTINUED | OUTPATIENT
Start: 2021-07-14 | End: 2021-07-21 | Stop reason: HOSPADM

## 2021-07-14 RX ADMIN — Medication 1 CAPSULE: at 07:58

## 2021-07-14 RX ADMIN — LEVONORGESTREL AND ETHINYL ESTRADIOL 1 TABLET: KIT at 22:47

## 2021-07-14 RX ADMIN — ACETAMINOPHEN 650 MG: 325 TABLET, FILM COATED ORAL at 18:52

## 2021-07-14 RX ADMIN — CARIPRAZINE 6 MG: 1.5 CAPSULE, GELATIN COATED ORAL at 22:09

## 2021-07-14 RX ADMIN — LITHIUM CARBONATE 600 MG: 300 TABLET, EXTENDED RELEASE ORAL at 22:02

## 2021-07-14 ASSESSMENT — ACTIVITIES OF DAILY LIVING (ADL)
HYGIENE/GROOMING: INDEPENDENT
DRESS: INDEPENDENT
LAUNDRY: WITH SUPERVISION
ORAL_HYGIENE: INDEPENDENT
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
## 2021-07-14 NOTE — PROGRESS NOTES
"  ----------------------------------------------------------------------------------------------------------  [facility], [place]   Psychiatric Progress Note  Hospital Day #1     Interim History:   The patient's care was discussed with the treatment team and chart notes were reviewed.    Sleep 6.25 hours (07/14/21 0500)  Scheduled Medications: compliant with Vraylar 6mg  PRN medications: No PRN medications requested/given.    Staff Report:   Pt up during the night X2 to come out and look at the clock, squinting because it is hard for her to see without glasses. No PRNs given or requested. No medical or behavioral events this shift.        Patient Interview:   Maddy Ortiz states that she is \"very paranoid\" and doesn't know if she has delusions. She is \"very concerned about Leonora (her daughter)\". She presented a pros and cons list about her living in a group home to continue care. Says she is \"very bored\" and that her job bores her, works as a  at [employer] for the last 2 months prior to quitting recently. 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 Also talked about the definition of work with loose associations. In discussing recent life, she states that \"everyone around her is dying\" like her neighbor and mom and that triggers her paranoia. She associates recent deaths with her apartment building in [place], says people who live there are \"irresponsible\". Endorses feeling unsafe at apt. Said paranoid thoughts and sofía started \"on the 4th of July\" and has gotten worse since her employment ended. She states that she hasn't been able to sleep and is \"manic\", endorses racing thoughts and attributes it to the sertraline, hydroxyzine \"helps a bit.\" Mood has been \"pretty good\" and \"in a state of euphoria\", does experience irritability. Endorses previous manic episode in 2018, \"Im sure I have been manic since\". Denies depression, says she has called [facility] " "crisis line 4 times since sofía started. Been on Cariprazine since \"transfering care\" doesn't know if it helps but thinks shes \"improving quite a bit from my delusions since 2018\", been on sertraline for the past couple months. Father of child lives in [place] and doesn't visit, daughter is currently with her brother. Denies substance abuse. Goal for hospitalization is to \"stay productive but not be manic\". Says she has been on Depakote, and has not taken lithium. Denies paranoid thoughts since being in the hospital. Says paranoid thoughts are telling her that she \"is a target\", but \"I know it is just my brain\". Discussed Lithium as an option for sofía and she is agreeable.     The risks, benefits, alternatives and side effects of any medication changes have been discussed and are understood by the patient and other caregivers.    Review of systems:     ROS was negative unless noted above.          Allergies:     Allergies   Allergen Reactions     Animal Dander      Other reaction(s): *Unknown     Metformin Fatigue     Mold      Other reaction(s): Runny Nose     Trees             Psychiatric Examination:   /87   Pulse 94   Temp 99  F (37.2  C) (Tympanic)   Resp 16   Ht 1.626 m (5' 4\")   Wt 102.4 kg (225 lb 12.8 oz)   SpO2 97%   BMI 38.76 kg/m    Weight is 225 lbs 12.8 oz  Body mass index is 38.76 kg/m .    MENTAL STATUS EXAM    Appearance:  no apparent distress, normal posture and appropriately dressed in scrubs  Attitude:  cooperative and friendly  Psychomotor:  no evidence of tics, dystonia, or tardive dyskinesia and restless  Eye Contact: appropriate  Speech:  fluent English, normal tone and talkative  Mood: \"pretty good and euphoric\"  Affect:  congruent and euphoric  Thought Content: endorses paranoid delusions prior to admission  Thought Process: racing thoughts  Sensorium: awake and alert  Cognition: memory grossly intact, distractible and poor concentration  Impulse control: fair  Insight: " good  Judgment: fair         Labs:     Results for orders placed or performed during the hospital encounter of 07/12/21 (from the past 24 hour(s))   CBC with platelets differential    Narrative    The following orders were created for panel order CBC with platelets differential.  Procedure                               Abnormality         Status                     ---------                               -----------         ------                     CBC with platelets and d...[[id number]]  Abnormal            Final result                 Please view results for these tests on the individual orders.   Comprehensive metabolic panel   Result Value Ref Range    Sodium 136 133 - 144 mmol/L    Potassium 4.2 3.4 - 5.3 mmol/L    Chloride 106 94 - 109 mmol/L    Carbon Dioxide (CO2) 24 20 - 32 mmol/L    Anion Gap 6 3 - 14 mmol/L    Urea Nitrogen 16 7 - 30 mg/dL    Creatinine 0.85 0.52 - 1.04 mg/dL    Calcium 9.2 8.5 - 10.1 mg/dL    Glucose 111 (H) 70 - 99 mg/dL    Alkaline Phosphatase 80 40 - 150 U/L    AST 45 0 - 45 U/L    ALT 59 (H) 0 - 50 U/L    Protein Total 7.7 6.8 - 8.8 g/dL    Albumin 3.9 3.4 - 5.0 g/dL    Bilirubin Total 0.3 0.2 - 1.3 mg/dL    GFR Estimate 88 >60 mL/min/1.73m2   Lipid panel   Result Value Ref Range    Cholesterol 235 (H) <200 mg/dL    Triglycerides 356 (H) <150 mg/dL    Direct Measure HDL 34 (L) >=50 mg/dL    LDL Cholesterol Calculated 130 (H) <=100 mg/dL    Non HDL Cholesterol 201 (H) <130 mg/dL    Patient Fasting > 8hrs? Yes    TSH with free T4 reflex and/or T3 as indicated   Result Value Ref Range    TSH 3.56 0.40 - 4.00 mU/L   CBC with platelets and differential   Result Value Ref Range    WBC Count 10.2 4.0 - 11.0 10e3/uL    RBC Count 4.59 3.80 - 5.20 10e6/uL    Hemoglobin 13.6 11.7 - 15.7 g/dL    Hematocrit 40.4 35.0 - 47.0 %    MCV 88 78 - 100 fL    MCH 29.6 26.5 - 33.0 pg    MCHC 33.7 31.5 - 36.5 g/dL    RDW 12.0 10.0 - 15.0 %    Platelet Count 266 150 - 450 10e3/uL    % Neutrophils 59 %    %  Lymphocytes 28 %    % Monocytes 7 %    % Eosinophils 4 %    % Basophils 1 %    % Immature Granulocytes 1 %    NRBCs per 100 WBC 0 <1 /100    Absolute Neutrophils 6.1 1.6 - 8.3 10e3/uL    Absolute Lymphocytes 2.9 0.8 - 5.3 10e3/uL    Absolute Monocytes 0.7 0.0 - 1.3 10e3/uL    Absolute Eosinophils 0.4 0.0 - 0.7 10e3/uL    Absolute Basophils 0.1 0.0 - 0.2 10e3/uL    Absolute Immature Granulocytes 0.1 (H) <=0.0 10e3/uL    Absolute NRBCs 0.0 10e3/uL        Assessment  & Plan      Assessment:   Maddy Ortiz is a 35 year old female with history of schizoaffective disorder, generalized anxiety disorder, delusional disorder, and concern for cluster B traits, who presented to [facility] ED on 2021 due to psychosis. This is in the context of recent psychosocial stressors of unemployment and individual at apartment complex who recently . Patient also has chronic stressors of mental illness, personal adoption, unstable relationship with father of daughter. Further history is necessary for full determination of events leading to hospitalization. Patient was disorganized on interview, and heavily fatigued. Presentation could be consistent with schizoaffective disorder vs. Delusional disorder. Fatigue likely 2/2 resolution of sofía symptoms, although difficult to ascertain due to patient ability to cooperate with interview.       Reason for inpatient hospitalization is disorganized behavior and suicidal ideation. Disposition pending clinical stabilization, medication optimization, and formulation of safe discharge plan.      Psychiatric Hospital course:   Maddy Ortiz was admitted to [facility] as a voluntary patient. Her  PTA sertraline was held due to patient's concern that it was contributing to her sofía. PTA Vraylar 6mg and melatonin 3mg were continued. 10 mg Olanzapine PO was given on 21 at 2135 for severe agitation/psychosis.     Principal Diagnosis:   #Schizoaffective disorder, bipolar type  - Start Lithium   mg QHS  -Vraylar 6mg PO at bed time    Secondary psychiatric & Medical diagnoses of concern this admission:   # Generalized anxiety disorder  - Hydroxyzine 25 mg Q4hrs PRN    PRN  Medications  - Hydroxyzine 25 mg Q4hrs PRN   - Olanzapine 10 mg PO/IM TID PRN severe agitation/psychosis  - Melatonin 3mg po at bedtime PRN  - Acetaminophen 650 mg po Q4hrs PRN pain     Legal Status:   Orders Placed This Encounter      Voluntary      Safety Assessment:   Behavioral Orders   Procedures     Code 1 - Restrict to Unit     Discontinue 1:1 attendant for suicide risk     Order Specific Question:   I have performed an in person assessment of the patient     Answer:   Based on this assessment the patient no longer requires a one on one attendant at this point in time.     Order Specific Question:   Rationale     Answer:   Medical Record Reviewed     Order Specific Question:   Rationale     Answer:   Patient States able to remain safe in hospital     Order Specific Question:   Rationale     Answer:   Modifications to care environment made to mitigate safety risk     Order Specific Question:   Rationale     Answer:   Routine observations are sufficient to monitor safety.     Routine Programming     As clinically indicated     Self Injury Precaution     Status 15     Every 15 minutes.     Suicide precautions     Patients on Suicide Precautions should have a Combination Diet ordered that includes a Diet selection(s) AND a Behavioral Tray selection for Safe Tray - with utensils, or Safe Tray - NO utensils         Disposition:  Pending stabilization & development of a safe discharge plan.     Patient will be treated in therapeutic milieu with appropriate individual and group therapies as described.  The patient was seen and the plan was discussed with the attending physician.     Gene Frank MD  Psychiatry PGY-1 Resident   Pager:  [phone]    Attestation:  IMarian MD, have personally performed an examination of this patient  and I have reviewed the resident's documentation.  I have edited the note to reflect all relevant changes.  I have discussed this patient with the house staff on 7/14/2021.  I agree with resident findings and plan in today's note and yesterdays resident H&P.  I have reviewed all vitals and laboratory findings.      I certifiy that the inpatient services were ordered in accordance with the Medicare regulations governing the order. This includes certification that hospital inpatient services are reasonable and necessary and in the case of services not specified as inpatient-only under 42 .22(n), that they are appropriately provided as inpatient services in accordance with the 2-midnight benchmark under 42 .3(e).     The reason for inpatient status is Bipolar Disorder.    Marian Plascencia M.D.,Ph.D.

## 2021-07-14 NOTE — PLAN OF CARE
"  Problem: Behavioral Health Plan of Care  Goal: Plan of Care Review  Outcome: No Change  Flowsheets (Taken 7/14/2021 1108)  Plan of Care Reviewed With: patient  Progress: no change  Patient Agreement with Plan of Care: agrees     Problem: Suicidal Behavior  Goal: Suicidal Behavior is Absent or Managed  Outcome: Improving  Flowsheets (Taken 7/14/2021 1108)  Mutually Determined Action Steps (Suicidal Behavior Absent/Managed): shares suicidal thoughts     Problem: Cognitive Impairment (Psychotic Signs/Symptoms)  Goal: Optimal Cognitive Function (Psychotic Signs/Symptoms)  Outcome: No Change     Problem: Mood Impairment (Psychotic Signs/Symptoms)  Goal: Improved Mood Symptoms (Psychotic Signs/Symptoms)  Outcome: No Change     Maddy denies SI-does c/o pressure in head-\"Not really pain that could be helped by Tylenol, but a pressure in my head. I think it's from stopping the sertraline.\" -acknowledges feeling foggy in her head-seems anxious, states she is just feeling funny \"from medicine I had last night\"-walking in [place] much of AM-states this is just for exercise-happy with new roommate-talking and exercising together which Maddy appeared to and stated she enjoyed  "

## 2021-07-14 NOTE — PLAN OF CARE
Assessment/Intervention/Current Symtoms and Care Coordination    Attended team meeting and reviewed chart notes.    Met with pt to complete initial assessment. Pt had a difficult time answering writers questions and following along. She did say she was willing to have a medication change.    Writer left message with pt's NAKUL Watson to gather additional information.    Discharge Plan or Goal  To home      Barriers to Discharge   Pt is disorganized      Referral Status    None made    Legal Status  voluntary   Yes - the patient is able to be screened
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
## 2021-07-14 NOTE — PROGRESS NOTES
07/13/21 2229   Patient Belongings   Did you bring any home meds/supplements to the hospital?  No   Patient Belongings locker   Patient Belongings Put in Hospital Secure Location (Security or Locker, etc.) clothing;keys   Belongings Search Yes   Clothing Search Yes   Second Staff Mercedez CALLAHAN     In locker:  T[place] shirt  Black zip up  Black sandals  Black jeans  Underwear x2  Bra x3  Lanyard with keys  Dl jeans  Thick black zip up sweatshirt  Blue t shirt    A               Admission:  I am responsible for any personal items that are not sent to the safe or pharmacy.  [facility] is not responsible for loss, theft or damage of any property in my possession.    Signature:  _________________________________ Date: _______  Time: _____                                              Staff Signature:  ____________________________ Date: ________  Time: _____      2nd Staff person, if patient is unable/unwilling to sign:    Signature: ________________________________ Date: ________  Time: _____     Discharge:  [facility] has returned all of my personal belongings:    Signature: _________________________________ Date: ________  Time: _____                                          Staff Signature:  ____________________________ Date: ________  Time: _____

## 2021-07-14 NOTE — PLAN OF CARE
Pt appeared to sleep 6.25 hours. Pt up during the night X2 to come out and look at the clock, squinting because it is hard for her to see without glasses. No PRNs given or requested. No medical or behavioral events this shift.      Problem: Sleep Disturbance  Goal: Adequate Sleep/Rest  Outcome: No Change

## 2021-07-14 NOTE — PLAN OF CARE
Assessment/Intervention/Current Symtoms and Care Coordination    Attended team meeting and reviewed chart notes    Initial psycho social complete and team note.        Discharge Plan or Goal  Return home with follow up in place.      Barriers to Discharge   Pt needs stabilization      Referral Status  None made      Legal Status  voluntary

## 2021-07-15 PROCEDURE — 250N000013 HC RX MED GY IP 250 OP 250 PS 637

## 2021-07-15 PROCEDURE — 250N000013 HC RX MED GY IP 250 OP 250 PS 637: Performed by: STUDENT IN AN ORGANIZED HEALTH CARE EDUCATION/TRAINING PROGRAM

## 2021-07-15 PROCEDURE — 124N000002 HC R&B MH UMMC

## 2021-07-15 PROCEDURE — 90853 GROUP PSYCHOTHERAPY: CPT

## 2021-07-15 RX ORDER — LITHIUM CARBONATE 450 MG
900 TABLET, EXTENDED RELEASE ORAL AT BEDTIME
Status: DISCONTINUED | OUTPATIENT
Start: 2021-07-15 | End: 2021-07-19

## 2021-07-15 RX ADMIN — LITHIUM CARBONATE 900 MG: 450 TABLET, EXTENDED RELEASE ORAL at 20:39

## 2021-07-15 RX ADMIN — LEVONORGESTREL AND ETHINYL ESTRADIOL 1 TABLET: KIT at 09:09

## 2021-07-15 RX ADMIN — Medication 1 CAPSULE: at 09:08

## 2021-07-15 RX ADMIN — CARIPRAZINE 6 MG: 1.5 CAPSULE, GELATIN COATED ORAL at 20:39

## 2021-07-15 ASSESSMENT — ACTIVITIES OF DAILY LIVING (ADL)
DRESS: INDEPENDENT
HYGIENE/GROOMING: INDEPENDENT
ORAL_HYGIENE: INDEPENDENT
LAUNDRY: WITH SUPERVISION

## 2021-07-15 NOTE — PROGRESS NOTES
"CLINICAL NUTRITION SERVICES - ASSESSMENT NOTE     Nutrition Prescription    RECOMMENDATIONS FOR MDs/PROVIDERS TO ORDER:  Consider referral to weight management as an outpatient pending pt preference as she was previously being seen in this clinic in 2020    Malnutrition Status:    Unable to determine    Recommendations already ordered by Registered Dietitian (RD):  None today    Future/Additional Recommendations:  None today     REASON FOR ASSESSMENT  Maddy Ortiz is a/an 35 year old female assessed by the dietitian for a positive MST screen for weight loss and a decreased appetite    NUTRITION HISTORY  - Pt reports her appetite PTA was reduced d/t medications as well as sofía. She reports with new medication changes, her appetite has come back very fast and she feels she is eating well here.  - Appears pt has been intermittently seeing RD at weight management clinic for weight loss support.     CURRENT NUTRITION ORDERS  Diet: Regular  Intake/Tolerance: pt reports she has been eating very well here and enjoys the food provided.    LABS  Labs reviewed    MEDICATIONS  Medications reviewed    ANTHROPOMETRICS  Ht Readings from Last 1 Encounters:   07/13/21 1.626 m (5' 4\")   Most Recent Weight: 102.4 kg (225 lb 12.8 oz)  IBW: 54.5 kg (188% IBW)  BMI: Obesity Grade II BMI 35-39.9  Weight History: Patient has lost 5 lbs (2%) over the last 1 month, overall gaining 5 lbs (2%) over the last 8 months. Pt previously following in weight management clinic, working on weight loss.   Wt Readings from Last 10 Encounters:   07/13/21 102.4 kg (225 lb 12.8 oz)   06/14/21 104.8 kg (231 lb)   05/19/21 104.3 kg (230 lb)   05/12/21 107.6 kg (237 lb 3.4 oz)   04/15/21 108 kg (238 lb)   03/19/21 108.4 kg (238 lb 15.7 oz)   11/06/20 100.2 kg (221 lb)   10/30/20 100.2 kg (221 lb)   10/26/20 100.2 kg (221 lb)   10/22/20 100.6 kg (221 lb 12.8 oz)     Dosing Weight: 66 kg - adjusted from admit wt    ASSESSED NUTRITION NEEDS  Estimated Energy " Needs: 6286-9262 kcals/day (25 - 30 kcals/kg)  Justification: Maintenance  Estimated Protein Needs: 66-79 grams protein/day (1 - 1.2 grams of pro/kg)  Justification: Obesity guidelines  Estimated Fluid Needs: 1 mL/kcal   Justification: Per provider pending fluid status    PHYSICAL FINDINGS  See malnutrition section below.    MALNUTRITION  % Intake: No decreased intake noted  % Weight Loss: Weight loss does not meet criteria   Subcutaneous Fat Loss: Unable to assess  Muscle Loss: Unable to assess  Fluid Accumulation/Edema: None noted  Malnutrition Diagnosis: Unable to determine    NUTRITION DIAGNOSIS  No nutrition diagnosis at this time       INTERVENTIONS  Implementation  Discussed nutrition history and PO since admission. Discussed menu ordering and snacks available on the unit. Pt filled out their menu and are finding foods they enjoy and feels she will get enough to eat during this admission. Denied any questions or concerns at this time.      Monitoring/Evaluation  No nutrition follow-up warranted at this time. RD to sign off. Please consult if further needs arise.       Mar Bernard RD, LD  5A/OB/Behavioral Health RD Pager: [phone]  Weekend Pager ([place]): [phone]

## 2021-07-15 NOTE — PROGRESS NOTES
"  ----------------------------------------------------------------------------------------------------------  [facility], [place]   Psychiatric Progress Note  Hospital Day #2     Interim History:   The patient's care was discussed with the treatment team and chart notes were reviewed.    Sleep 6.5 hours (07/15/21 0600)  Scheduled Medications: compliant with Vraylar 6mg  PRN medications: Given 650mg Tylenol at 1852 for \"pressure in her head\" .    Staff Report:   \"Pt was active and visible in the milieu; was appeared hypomanic and restless at the start of the shift ;attended group briefly; endorsed pressure in her head; received 650 mg tylenol at 1852;  rated anxiety 8/10 and depression 5/10; started on lithium; endorsed paranoia of some one chasseing her in her sleep; denied SI/SIB and AVH; medication compliant; denied medication side effect; will continue to monitor and assess.\"  \"Pt briefly attended the Therapeutic Recreation group with focus on healthy lifestyle, exercises, and relaxation.  Pt chose not to participate in the group and left after a few minutes.\"    Patient Interview:   Maddy Ortiz says she is doing \"good\" and she \"slept a lot better\". Appetite is \"ok\" and \"better than when she was manic\". Says she is \"more tired, but that is better because I am not manic\". Denies racing thoughts. Endorses increased thirst from starting lithium yesterday, discussed potential other side effects for her to look out for and why getting blood levels is important. Patient endorsed a headache last night and said \"it felt like I was going through withdrawal\" and attributes it to sertraline, however she denies one now. Patient asked about a study on schizophrenia and how medications work. Patient asked about paperwork for her brother to take care of her daughter. She doesn't want her brothers to visit her \"because they are not retired\" but would like her dad to visit. Discussed increasing " "lithium dose to 900 mg and patient agreed.     The risks, benefits, alternatives and side effects of any medication changes have been discussed and are understood by the patient and other caregivers.    Review of systems:     ROS was negative unless noted above.          Allergies:     Allergies   Allergen Reactions     Animal Dander      Other reaction(s): *Unknown     Metformin Fatigue     Mold      Other reaction(s): Runny Nose     Trees             Psychiatric Examination:   BP (!) 137/91   Pulse 94   Temp 97.8  F (36.6  C) (Oral)   Resp 16   Ht 1.626 m (5' 4\")   Wt 102.4 kg (225 lb 12.8 oz)   SpO2 98%   BMI 38.76 kg/m    Weight is 225 lbs 12.8 oz  Body mass index is 38.76 kg/m .    MENTAL STATUS EXAM    Appearance:  normal posture, normal gait and appropriately dressed in personal clothing  Attitude:  cooperative and engaged  Psychomotor:  no evidence of tics, dystonia, or tardive dyskinesia  Eye Contact: appropriate  Speech:  fluent English, normal tone, loud, hyperverbal but improving  Mood: \"good\"  Affect:  congruent and appropriate  Thought Content: denies suicidal ideation  Thought Process: linear and denies racing thoughts  Sensorium: awake and alert  Cognition: memory grossly intact  Impulse control: good  Insight: good  Judgment: fair         Labs:     No results found for this or any previous visit (from the past 24 hour(s)).     Assessment  & Plan      Assessment:   Maddy Ortiz is a 35 year old female with history of schizoaffective disorder, generalized anxiety disorder, delusional disorder, and concern for cluster B traits, who presented to [facility] ED on 2021 due to psychosis. This is in the context of recent psychosocial stressors of unemployment and individual at apartment [facility] who recently . Patient also has chronic stressors of mental illness, personal adoption, unstable relationship with father of daughter. Further history is necessary for full determination of events leading " to hospitalization. Patient was disorganized on interview, and heavily fatigued. Presentation could be consistent with schizoaffective disorder vs. Delusional disorder. Fatigue likely 2/2 resolution of sofía symptoms, although difficult to ascertain due to patient ability to cooperate with interview.       Reason for inpatient hospitalization is disorganized behavior and suicidal ideation. Disposition pending clinical stabilization, medication optimization, and formulation of safe discharge plan.      Psychiatric Hospital course:   Maddy Ortiz was admitted to [facility] as a voluntary patient. Her  PTA sertraline was held due to patient's concern that it was contributing to her sofía. PTA Vraylar 6mg and melatonin 3mg were continued. 10 mg Olanzapine PO was given on 7/13/21 at 2135 for severe agitation/psychosis. 7/14 pt agreed to start mood stabilizer Lithium. 7/15 pt agreed to increase Lithium dose to 900mg at bedtime.     Principal Diagnosis:   #Schizoaffective disorder, bipolar type  - Lithium  mg at bedtime  -Vraylar 6mg PO at bed time    Secondary psychiatric & Medical diagnoses of concern this admission:   # Generalized anxiety disorder  - Hydroxyzine 25 mg Q4hrs PRN    PRN  Medications  - Hydroxyzine 25 mg Q4hrs PRN   - Olanzapine 10 mg PO/IM TID PRN severe agitation/psychosis  - Melatonin 3mg po at bedtime PRN  - Acetaminophen 650 mg po Q4hrs PRN pain     Legal Status:   Orders Placed This Encounter      Voluntary      Safety Assessment:   Behavioral Orders   Procedures     Code 1 - Restrict to Unit     Discontinue 1:1 attendant for suicide risk     Order Specific Question:   I have performed an in person assessment of the patient     Answer:   Based on this assessment the patient no longer requires a one on one attendant at this point in time.     Order Specific Question:   Rationale     Answer:   Medical Record Reviewed     Order Specific Question:   Rationale     Answer:   Patient States able to  remain safe in hospital     Order Specific Question:   Rationale     Answer:   Modifications to care environment made to mitigate safety risk     Order Specific Question:   Rationale     Answer:   Routine observations are sufficient to monitor safety.     Routine Programming     As clinically indicated     Self Injury Precaution     Status 15     Every 15 minutes.     Suicide precautions     Patients on Suicide Precautions should have a Combination Diet ordered that includes a Diet selection(s) AND a Behavioral Tray selection for Safe Tray - with utensils, or Safe Tray - NO utensils         Disposition:  Pending stabilization & development of a safe discharge plan.     Patient will be treated in therapeutic milieu with appropriate individual and group therapies as described.  The patient was seen and the plan was discussed with the attending physician.     Isaiah Fonseca, MS3    Gene Frank MD  Psychiatry PGY-1 Resident   Pager:  [phone]    Resident attestation:  I was present with the medical/GUERO student who participated in the service and in the documentation of the note.  I have verified the history and medical decision making.  I agree with the assessment and plan of care as documented in the note.

## 2021-07-15 NOTE — PLAN OF CARE
"  Problem: Adult Inpatient Plan of Care  Goal: Optimal Comfort and Wellbeing  Outcome: No Change     Problem: Sleep Disturbance  Goal: Adequate Sleep/Rest  Outcome: No Change   Observed to have slept well w/ regular non-labored respirations and no reported or observed distress other than occasional non-productive coughing;  Pt.  Feels as though she may be getting a cold.  Afebrile.  Fluids encouraged and tolerated well.  Observed to have slept for approx 6.5 hrs overnight.  When questioned re: her hx of needing an Epi pen, stated that she had only needed it once quite some time ago \"when I had a reaction w/ my face and eyes swelling\" and admitted to difficulty swallowing during that time only after having taken an allergy shot . Safe, supportive therapeutic environment maintained.   "

## 2021-07-15 NOTE — PLAN OF CARE
Nursing plan of care   Problem: Mood Impairment (Psychotic Signs/Symptoms)  Goal: Improved Mood Symptoms (Psychotic Signs/Symptoms)  Outcome: No Change  Pt was active and visible in the milieu; was appeared hypomanic and restless at the start of the shift ;attended group briefly; endorsed pressure in her head; received 650 mg tylenol at 1852;  rated anxiety 8/10 and depression 5/10; started on lithium; endorsed paranoia of some one chasseing her in her sleep; denied SI/SIB and AVH; medication compliant; denied medication side effect; will continue to monitor and assess.

## 2021-07-15 NOTE — PLAN OF CARE
Assessment/Intervention/Current Symtoms and Care Coordination    Attended team meeting and reviewed chart notes.    Writer spoke with pt regarding the form she signed for care of her daughter. Pt wanted this done in case she could no longer care for her daughter. Pt appears to be displaying manic symptoms.    Pt's medication will be adjusted to target symptoms.    Discharge Plan or Goal   return to home with follow up      Barriers to Discharge   Pt needs further stabilization    Referral Status  None made      Legal Status  voluntary

## 2021-07-15 NOTE — PLAN OF CARE
Pt briefly attended the Therapeutic Recreation group with focus on healthy lifestyle, exercises, and relaxation.  Pt chose not to participate in the group and left after a few minutes.

## 2021-07-15 NOTE — PLAN OF CARE
Problem: Behavioral Health Plan of Care  Goal: Plan of Care Review  Outcome: Improving  Flowsheets (Taken 7/15/2021 1154)  Plan of Care Reviewed With: patient  Progress: improving  Patient Agreement with Plan of Care: agrees     Problem: Cognitive Impairment (Psychotic Signs/Symptoms)  Goal: Optimal Cognitive Function (Psychotic Signs/Symptoms)  Outcome: Improving  Flowsheets (Taken 7/15/2021 1154)  Mutually Determined Action Steps (Optimal Cognitive Function): remains focused during activity     Problem: Mood Impairment (Psychotic Signs/Symptoms)  Goal: Improved Mood Symptoms (Psychotic Signs/Symptoms)  Outcome: Improving  Flowsheets (Taken 7/15/2021 1154)  Mutually Determined Action Steps (Improved Mood Symptoms): acknowledges progress     Maddy river resting in room-denies SI-states she is tired and needs to catch up on sleep-in 1:1 states she is sad about all the things she cannot do which are bad for her-made plan to start list of things she likes to do that she can do and are beneficial for her-

## 2021-07-16 PROCEDURE — 250N000013 HC RX MED GY IP 250 OP 250 PS 637

## 2021-07-16 PROCEDURE — 99232 SBSQ HOSP IP/OBS MODERATE 35: CPT | Mod: GC | Performed by: PSYCHIATRY & NEUROLOGY

## 2021-07-16 PROCEDURE — 250N000013 HC RX MED GY IP 250 OP 250 PS 637: Performed by: STUDENT IN AN ORGANIZED HEALTH CARE EDUCATION/TRAINING PROGRAM

## 2021-07-16 PROCEDURE — 124N000002 HC R&B MH UMMC

## 2021-07-16 PROCEDURE — H2032 ACTIVITY THERAPY, PER 15 MIN: HCPCS

## 2021-07-16 PROCEDURE — G0177 OPPS/PHP; TRAIN & EDUC SERV: HCPCS

## 2021-07-16 RX ADMIN — LITHIUM CARBONATE 900 MG: 450 TABLET, EXTENDED RELEASE ORAL at 20:04

## 2021-07-16 RX ADMIN — HYDROXYZINE HYDROCHLORIDE 25 MG: 25 TABLET, FILM COATED ORAL at 15:35

## 2021-07-16 RX ADMIN — Medication 1 CAPSULE: at 08:40

## 2021-07-16 RX ADMIN — CARIPRAZINE 6 MG: 1.5 CAPSULE, GELATIN COATED ORAL at 20:35

## 2021-07-16 RX ADMIN — LEVONORGESTREL AND ETHINYL ESTRADIOL 1 TABLET: KIT at 08:42

## 2021-07-16 RX ADMIN — MELATONIN TAB 3 MG 3 MG: 3 TAB at 21:13

## 2021-07-16 ASSESSMENT — ACTIVITIES OF DAILY LIVING (ADL)
LAUNDRY: WITH SUPERVISION
DRESS: INDEPENDENT
ORAL_HYGIENE: INDEPENDENT
HYGIENE/GROOMING: INDEPENDENT

## 2021-07-16 NOTE — PROGRESS NOTES
Behavioral Health  Note   Behavioral Health  Spirituality Group Note     [facility]    Name: Maddy Ortiz    YOB: 1985   MRN: [id number]    Age: 35 year old     Patient attended -led group, which included discussion of spirituality, coping with illness and building resilience.   Patient attended group for 1.0 hrs.   patient demonstrated an appreciation of topic's application for their personal circumstances.     [facility]  Staff    Page [phone]

## 2021-07-16 NOTE — PLAN OF CARE
"Nursing plan of care   Problem: Mood Impairment (Psychotic Signs/Symptoms)  Goal: Improved Mood Symptoms (Psychotic Signs/Symptoms)  Outcome: Improving  Flowsheets (Taken 7/15/2021 2105)  Mutually Determined Action Steps (Improved Mood Symptoms):    verbalizes increased insight    acknowledges progress    identifies thought distortion   Pt has been active and visible in the milieu; presented with pleasant and full range affect-neutral; denied paranoia; appeared delusional with distorted reality of  relating what she saw on \"Hunger game\" might be happen in this hospital, but she said \"I don't scared\". Pt stated that people around her includes her family easily irritated with her school studies. 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      "

## 2021-07-16 NOTE — PLAN OF CARE
"  Problem: Adult Inpatient Plan of Care  Goal: Optimal Comfort and Wellbeing  Outcome: No Change     Problem: Sleep Disturbance  Goal: Adequate Sleep/Rest  Outcome: Declining    Rec'd pt. Sleeping in bed w/ regular non-labored respirations as shift commenced.  Slept well till approx 0400 at which time pt was up to BR for voiding.  W brief /dry non-productive coughing upon awakening.  Came out of room, stood by room door looking up at gonzalez clock for periods of time ;  Would occasionally say \"Oh I have to go write this down\".  C/o being \"really hungry\" and was provided a snack of her choice.  Paced the halls a little, attempted to journal out in the dining area, then ret'd to her room where she continued journaling on her bed.  \"I have to write this down for the Drs. this morning\" Came out several more time and stood looking at the clock intensely.  Verbalizes feeling \"a little better\"  and no longer feeling fearful or manic.  Does appear to be experiencing some paranoia however. Continues to nap, then journal.  \" I want to find out what causes me to be so frustrated when I do something new so I can tell my Dr.\"  Observed to have slept for approx 4.25 hrs overnight w/o reported or noted distress.  Safe, supportive, therapeutic environment maintained.    "

## 2021-07-16 NOTE — PLAN OF CARE
"  Problem: Manic Behavior Episode  Goal: Decreased Manic Symptoms  Outcome: Improving     Problem: Mood Impairment (Psychotic Signs/Symptoms)  Goal: Improved Mood Symptoms (Psychotic Signs/Symptoms)  Outcome: Improving     Pt describes her mood as \"stable\". She is disorganized on approach. Tangential during conversations. Stated she feels uncomfortable about her hygiene. Writer offered a shower, but pt declined, stated she needed to focus on her mental health. She is out on the unit. Social and journaling. Attending partial groups. Reports moderate anxiety and a little depression. Denies SI and hallucinations. Requested provider to look over her journal to get a better idea of her thought process- journal given to provider. Will continue to monitor and assist as needed.  "

## 2021-07-16 NOTE — PROGRESS NOTES
"  ----------------------------------------------------------------------------------------------------------  [facility], [place]   Psychiatric Progress Note  Hospital Day #3     Interim History:   The patient's care was discussed with the treatment team and chart notes were reviewed.    Sleep 4.25 hours (07/16/21 0600)  Scheduled Medications: compliant with Vraylar 6mg and Lithium 900 mg PO at bedtime. Lactobacillus rhamnosus, Aviane.  PRN medications: No PRN medications.    Staff Report:   \"Maddy river resting in room-denies SI-states she is tired and needs to catch up on sleep-in 1:1 states she is sad about all the things she cannot do which are bad for her-made plan to start list of things she likes to do that she can do and are beneficial for her.\"    \" Pt has been active and visible in the milieu; presented with pleasant and full range affect-neutral; denied paranoia; appeared delusional with distorted reality of  relating what she saw on \"Hunger game\" might be happen in this hospital, but she said \"I'm not scared\". Pt stated that people around her includes her family easily irritated with her school studies. 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 She also began reading a note she wrote saying she \"wishes she could do thinks she used to do\" such as \"go to school, socialize, go on social media\". She discussed her parents and future goals with loose associations, however she stated that \"I have a lot of delusions\" and that \"Im not sure if this is a delusion\".  She says that she is taking her meds and " "\"doesn't think I have PTSD\". Sleep was \"good but I woke up at 4 hungry\". She also read a list of her requests which included \"paint nails, check emails, buy food with ebt but probably not\".  Wants to get in contact with her therapist.      The risks, benefits, alternatives and side effects of any medication changes have been discussed and are understood by the patient and other caregivers.    Review of systems:     ROS was negative unless noted above.          Allergies:     Allergies   Allergen Reactions     Animal Dander      Other reaction(s): *Unknown     Metformin Fatigue     Mold      Other reaction(s): Runny Nose     Trees             Psychiatric Examination:   /81   Pulse 93   Temp 97.5  F (36.4  C) (Tympanic)   Resp 16   Ht 1.626 m (5' 4\")   Wt 102.4 kg (225 lb 12.8 oz)   SpO2 98%   BMI 38.76 kg/m    Weight is 225 lbs 12.8 oz  Body mass index is 38.76 kg/m .    MENTAL STATUS EXAM    Appearance:  no apparent distress, normal posture, normal gait and appropriately dressed  Attitude:  cooperative and pleasant  Psychomotor:  no evidence of tics, dystonia, or tardive dyskinesia  Eye Contact: appropriate, occasionally upgoing  Speech:  fluent English, normal tone and increasedl rate, increased volume.  Mood: \"good\"  Affect:  congruent  Thought Content: endorses delusions  Thought Process: looseness of association  Sensorium: awake and alert  Cognition: memory grossly intact  Impulse control: fair  Insight: fair  Judgment: fair           Labs:     No results found for this or any previous visit (from the past 24 hour(s)).     Assessment  & Plan      Assessment:   Maddy Ortiz is a 35 year old female with history of schizoaffective disorder, generalized anxiety disorder, delusional disorder, and concern for cluster B traits, who presented to [facility] ED on 07/13/2021 due to psychosis. This is in the context of recent psychosocial stressors of unemployment and individual at apartment complex who recently " . Patient also has chronic stressors of mental illness, personal adoption, unstable relationship with father of daughter. Further history is necessary for full determination of events leading to hospitalization. Patient was disorganized on interview, and heavily fatigued. Presentation could be consistent with schizoaffective disorder vs. Delusional disorder. Fatigue likely 2/2 resolution of sofía symptoms, although difficult to ascertain due to patient ability to cooperate with interview.       Reason for inpatient hospitalization is disorganized behavior and suicidal ideation. Disposition pending clinical stabilization, medication optimization, and formulation of safe discharge plan.      Psychiatric Hospital course:   Maddy Ortiz was admitted to [facility] as a voluntary patient. Her  PTA sertraline was held due to patient's concern that it was contributing to her sofía. PTA Vraylar 6mg and melatonin 3mg were continued. 10 mg Olanzapine PO was given on 21 at 2135 for severe agitation/psychosis.  pt agreed to start mood stabilizer Lithium. 7/15 pt agreed to increase Lithium dose to 900mg at bedtime. Lithium levels on     Principal Diagnosis:   #Schizoaffective disorder, bipolar type  - Lithium  mg at bedtime.   - Obtain lithium level on .  -Vraylar 6mg PO at bed time    Secondary psychiatric & Medical diagnoses of concern this admission:   # Generalized anxiety disorder  - Hydroxyzine 25 mg Q4hrs PRN    PRN  Medications  - Hydroxyzine 25 mg Q4hrs PRN   - Olanzapine 10 mg PO/IM TID PRN severe agitation/psychosis  - Melatonin 3mg po at bedtime PRN  - Acetaminophen 650 mg po Q4hrs PRN pain     Legal Status:   Orders Placed This Encounter      Voluntary      Safety Assessment:   Behavioral Orders   Procedures     Code 1 - Restrict to Unit     Discontinue 1:1 attendant for suicide risk     Order Specific Question:   I have performed an in person assessment of the patient     Answer:   Based  on this assessment the patient no longer requires a one on one attendant at this point in time.     Order Specific Question:   Rationale     Answer:   Medical Record Reviewed     Order Specific Question:   Rationale     Answer:   Patient States able to remain safe in hospital     Order Specific Question:   Rationale     Answer:   Modifications to care environment made to mitigate safety risk     Order Specific Question:   Rationale     Answer:   Routine observations are sufficient to monitor safety.     Routine Programming     As clinically indicated     Self Injury Precaution     Status 15     Every 15 minutes.     Suicide precautions     Patients on Suicide Precautions should have a Combination Diet ordered that includes a Diet selection(s) AND a Behavioral Tray selection for Safe Tray - with utensils, or Safe Tray - NO utensils         Disposition:  Pending stabilization & development of a safe discharge plan.     Patient will be treated in therapeutic milieu with appropriate individual and group therapies as described.  The patient was seen and the plan was discussed with the attending physician.     Isaiah Fonseca, MS3    Gene Frank MD  Psychiatry PGY-1 Resident   Pager:  [phone]    Resident attestation:  I was present with the medical/GUERO student who participated in the service and in the documentation of the note.  I have verified the history and medical decision making.  I agree with the assessment and plan of care as documented in the note.    Attestation:  This patient has been seen and evaluated by me, Marian Plascencia MD.  I have discussed this patient with the house staff team including the resident and medical student and I agree with the findings and plan in this note.    I have reviewed today's vital signs, medications, labs and imaging. Marian Plascencia MD , PhD.

## 2021-07-16 NOTE — PLAN OF CARE
Assessment/Intervention/Current Symtoms and Care Coordination    Attended team meeting and reviewed chart notes.    Pt is stabilizing on medication.    Writer left message with Pt's CM Shira Bhatt ([phone]) regarding pt needing clothing when she discharges due to weight gain.      Discharge Plan or Goal  Return home with follow up in place      Barriers to Discharge   Pt needs further stabilization      Referral Status    None made    Legal Status  voluntary

## 2021-07-16 NOTE — PROGRESS NOTES
" 07/15/21 1900   Groups   Details   (Psychotherapy)   Number of patients attending the group:  5  Group Length:  1 Hours    Group Therapy Type: Psychotherapy    Summary of Group / Topics Discussed:      The  Psychotherapy group goal is to promote insight to positive choice and change. Group processing is within a supportive and safe environment. Patients will process emotions using verbal group and expressive psychotherapy interventions including visual art/writing interventions.    Group interventions support patients by: creative self expression, communication/social skills and supports, self efficacy/empowerment and emotional regulation    Modalities to reach these goals include: Narrative psychology, Expressive Arts Therapies and Mindfulness practices    Subjective -patient report of mood today- \" irritability, high energy, adrenaline\"    Objective/ Intervention- Goal of group and Therapeutic modality utilized- Gratitude - Process group    Group Response- engaged    Patient Response- Pt stayed engaged throughout verbally.  She did not complete a worksheet or art about gratitude. She said she wants to work on social cues, extremely hyper verbal and with loose tangential thoughts. She spoke about \"Jay Jay putting her down\" and talked about trying to be \" no fuss, sense of being, low , quiet, obedient and good.\"     Juarez August, LMFT, ATR-BC            "

## 2021-07-17 PROCEDURE — 250N000013 HC RX MED GY IP 250 OP 250 PS 637: Performed by: STUDENT IN AN ORGANIZED HEALTH CARE EDUCATION/TRAINING PROGRAM

## 2021-07-17 PROCEDURE — 124N000002 HC R&B MH UMMC

## 2021-07-17 PROCEDURE — 250N000013 HC RX MED GY IP 250 OP 250 PS 637

## 2021-07-17 RX ADMIN — HYDROXYZINE HYDROCHLORIDE 25 MG: 25 TABLET, FILM COATED ORAL at 00:41

## 2021-07-17 RX ADMIN — LEVONORGESTREL AND ETHINYL ESTRADIOL 1 TABLET: KIT at 09:00

## 2021-07-17 RX ADMIN — CARIPRAZINE 6 MG: 1.5 CAPSULE, GELATIN COATED ORAL at 19:42

## 2021-07-17 RX ADMIN — Medication 1 CAPSULE: at 09:00

## 2021-07-17 RX ADMIN — LITHIUM CARBONATE 900 MG: 450 TABLET, EXTENDED RELEASE ORAL at 19:43

## 2021-07-17 ASSESSMENT — ACTIVITIES OF DAILY LIVING (ADL)
LAUNDRY: WITH SUPERVISION
ORAL_HYGIENE: INDEPENDENT
HYGIENE/GROOMING: INDEPENDENT
DRESS: INDEPENDENT
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
## 2021-07-17 NOTE — PLAN OF CARE
Patient in milieu majority of shift, interacting with peers.  Pt had one episode of excitability while talking to ex-BF on phone.  Pt encouraged to journal feelings which pt did.  Pt later told staff that she had called 'Jay Jay' again, but conversation was positive.

## 2021-07-17 NOTE — PLAN OF CARE
Pt attended the structured Therapeutic Recreation group, participating in a group activity. Pt participated in group discussion, leisure participation, and social engagement to gain self-esteem, manage behaviors, improve social skills, decrease isolation, and reduce anxiety/depression.   Pt participated in the group activity for approximately half of the group, going in and out several times during the hour. Pt helped contribute to the clues and descriptions throughout the activity.

## 2021-07-17 NOTE — PLAN OF CARE
"  Problem: Adult Inpatient Plan of Care  Goal: Optimal Comfort and Wellbeing  Outcome: No Change     Problem: Sleep Disturbance  Goal: Adequate Sleep/Rest  Outcome: No Change    Rec'd pt. Sleeping w/ regular non-labored respirations as shift commenced.  Wakeful shortly thereafter w/ c/o \"stomach discomfort\" and requesting food.  Attributes discomfort to a \"need to eat\".  Snack provided.  Pleasant w/ noted thought disorganization.  Sat out in lounge for a brief time.  Verbalized not being able to sleep because of anxiety which she chose not to discuss.  Realistic , supportive intervention provided.  Hydroxyzine administered w/ effectiveness w/I the hr.  Could be overheard w/ non-laborous snoring thereafter.  Up to BR on several occasions for voiding and would readily return to bed and back to sleep.  Observed to have slept for approx 5.75 hrs overnight.  Safe, therapeutic environment maintained.    "

## 2021-07-18 PROCEDURE — 124N000002 HC R&B MH UMMC

## 2021-07-18 PROCEDURE — G0177 OPPS/PHP; TRAIN & EDUC SERV: HCPCS

## 2021-07-18 PROCEDURE — 250N000013 HC RX MED GY IP 250 OP 250 PS 637

## 2021-07-18 PROCEDURE — 250N000013 HC RX MED GY IP 250 OP 250 PS 637: Performed by: STUDENT IN AN ORGANIZED HEALTH CARE EDUCATION/TRAINING PROGRAM

## 2021-07-18 RX ADMIN — MELATONIN TAB 3 MG 3 MG: 3 TAB at 20:28

## 2021-07-18 RX ADMIN — BENZTROPINE MESYLATE 0.5 MG: 0.5 TABLET ORAL at 04:05

## 2021-07-18 RX ADMIN — LEVONORGESTREL AND ETHINYL ESTRADIOL 1 TABLET: KIT at 08:19

## 2021-07-18 RX ADMIN — ALUMINUM HYDROXIDE, MAGNESIUM HYDROXIDE, AND SIMETHICONE 30 ML: 200; 200; 20 SUSPENSION ORAL at 18:01

## 2021-07-18 RX ADMIN — ALUMINUM HYDROXIDE, MAGNESIUM HYDROXIDE, AND SIMETHICONE 30 ML: 200; 200; 20 SUSPENSION ORAL at 22:02

## 2021-07-18 RX ADMIN — ALUMINUM HYDROXIDE, MAGNESIUM HYDROXIDE, AND SIMETHICONE 30 ML: 200; 200; 20 SUSPENSION ORAL at 13:59

## 2021-07-18 RX ADMIN — CARIPRAZINE 6 MG: 1.5 CAPSULE, GELATIN COATED ORAL at 20:28

## 2021-07-18 RX ADMIN — LITHIUM CARBONATE 900 MG: 450 TABLET, EXTENDED RELEASE ORAL at 20:28

## 2021-07-18 RX ADMIN — HYDROXYZINE HYDROCHLORIDE 25 MG: 25 TABLET, FILM COATED ORAL at 04:05

## 2021-07-18 RX ADMIN — HYDROXYZINE HYDROCHLORIDE 25 MG: 25 TABLET, FILM COATED ORAL at 20:31

## 2021-07-18 RX ADMIN — Medication 1 CAPSULE: at 08:19

## 2021-07-18 ASSESSMENT — MIFFLIN-ST. JEOR: SCORE: 1712.38

## 2021-07-18 ASSESSMENT — ACTIVITIES OF DAILY LIVING (ADL)
HYGIENE/GROOMING: INDEPENDENT
ORAL_HYGIENE: INDEPENDENT
DRESS: INDEPENDENT
LAUNDRY: WITH SUPERVISION

## 2021-07-18 NOTE — PLAN OF CARE
"Pt appeared to sleep 3 hours. Pt up @0230 reporting hunger that makes it hard for her to sleep, snacks given. Pt up writing in journal and pacing in room, she reports not feeling tired and is not redirectable to bed. Pt requested a PRN for anxiety and restlessness, stating she feels anxious and \"would like benztropine\" PRN benztropine and hydroxyzine given @0405 with reported improved symptoms an hour later. Pt remains awake in room frequently coming into the halls to check the time and returns to room. Pt found attempting to stand up on bed to \"look at the ceiling\" pt redirectable and remains disorganized. Pt would like to talk to the treatment team about her cough and getting a pregnancy test because \"my stomach is so big\".       Problem: Manic Behavior Episode  Goal: Decreased Manic Symptoms  Outcome: No Change     Problem: Sleep Disturbance  Goal: Adequate Sleep/Rest  Outcome: Declining     "

## 2021-07-18 NOTE — PLAN OF CARE
Patient out in milieu, interacting with peers and staff.  Patient journaling and talking on phone.  Patient discussed working on coping skills.

## 2021-07-18 NOTE — PLAN OF CARE
"  Problem: Behavioral Health Plan of Care  Goal: Plan of Care Review  Outcome: Improving  Flowsheets (Taken 7/18/2021 1239)  Plan of Care Reviewed With: patient  Progress: improving  Patient Agreement with Plan of Care: agrees     Problem: Cognitive Impairment (Psychotic Signs/Symptoms)  Goal: Optimal Cognitive Function (Psychotic Signs/Symptoms)  Outcome: Improving  Flowsheets (Taken 7/18/2021 1239)  Mutually Determined Action Steps (Optimal Cognitive Function): participates in problem resolution     Problem: Mood Impairment (Psychotic Signs/Symptoms)  Goal: Improved Mood Symptoms (Psychotic Signs/Symptoms)  Outcome: Improving  Flowsheets (Taken 7/18/2021 1239)  Mutually Determined Action Steps (Improved Mood Symptoms): acknowledges progress     Problem: Manic Behavior Episode  Goal: Decreased Manic Symptoms  Outcome: Improving     Maddy sleeping off and on throughout the day complaining of feeling very tired (up much of night)-states she is feeling sad-concerned she doesn't know how to learn new patterns to make positive changes in her life-feels she needs more guidance for how to make changes and more support post discharge to implement-states she continues relationship with ex-boyfriend,  Jay Jay, because she doesn't want to bother family by asking them to do things Jay Jay does for her-agrees to attempt to remain OOB bed rest of day and this evening to allow improved sleep tonight-states she continues to write thoughts/issues in her journal for review with tx team-Received Maalox at 1359 for c/o \"heart burn\"-discussed sitting up at least hour post eating to allow food to digest to prevent heart burn-reports at 1420 that Maalox already effective in reducing discomfort  "

## 2021-07-19 LAB — LITHIUM SERPL-SCNC: 0.5 MMOL/L

## 2021-07-19 PROCEDURE — 250N000013 HC RX MED GY IP 250 OP 250 PS 637: Performed by: STUDENT IN AN ORGANIZED HEALTH CARE EDUCATION/TRAINING PROGRAM

## 2021-07-19 PROCEDURE — G0177 OPPS/PHP; TRAIN & EDUC SERV: HCPCS

## 2021-07-19 PROCEDURE — 36415 COLL VENOUS BLD VENIPUNCTURE: CPT

## 2021-07-19 PROCEDURE — 80178 ASSAY OF LITHIUM: CPT

## 2021-07-19 PROCEDURE — 250N000013 HC RX MED GY IP 250 OP 250 PS 637

## 2021-07-19 PROCEDURE — 99231 SBSQ HOSP IP/OBS SF/LOW 25: CPT | Mod: GC | Performed by: PSYCHIATRY & NEUROLOGY

## 2021-07-19 PROCEDURE — 124N000002 HC R&B MH UMMC

## 2021-07-19 RX ADMIN — LEVONORGESTREL AND ETHINYL ESTRADIOL 1 TABLET: KIT at 07:37

## 2021-07-19 RX ADMIN — LITHIUM CARBONATE 1200 MG: 300 TABLET, EXTENDED RELEASE ORAL at 20:57

## 2021-07-19 RX ADMIN — HYDROXYZINE HYDROCHLORIDE 25 MG: 25 TABLET, FILM COATED ORAL at 03:57

## 2021-07-19 RX ADMIN — ALUMINUM HYDROXIDE, MAGNESIUM HYDROXIDE, AND SIMETHICONE 30 ML: 200; 200; 20 SUSPENSION ORAL at 12:04

## 2021-07-19 RX ADMIN — MELATONIN TAB 3 MG 3 MG: 3 TAB at 20:57

## 2021-07-19 RX ADMIN — ALUMINUM HYDROXIDE, MAGNESIUM HYDROXIDE, AND SIMETHICONE 30 ML: 200; 200; 20 SUSPENSION ORAL at 07:38

## 2021-07-19 RX ADMIN — BENZTROPINE MESYLATE 0.5 MG: 0.5 TABLET ORAL at 20:59

## 2021-07-19 RX ADMIN — ALUMINUM HYDROXIDE, MAGNESIUM HYDROXIDE, AND SIMETHICONE 30 ML: 200; 200; 20 SUSPENSION ORAL at 16:04

## 2021-07-19 RX ADMIN — ALUMINUM HYDROXIDE, MAGNESIUM HYDROXIDE, AND SIMETHICONE 30 ML: 200; 200; 20 SUSPENSION ORAL at 03:57

## 2021-07-19 RX ADMIN — ALUMINUM HYDROXIDE, MAGNESIUM HYDROXIDE, AND SIMETHICONE 30 ML: 200; 200; 20 SUSPENSION ORAL at 19:56

## 2021-07-19 RX ADMIN — CARIPRAZINE 6 MG: 1.5 CAPSULE, GELATIN COATED ORAL at 20:57

## 2021-07-19 RX ADMIN — Medication 1 CAPSULE: at 07:35

## 2021-07-19 ASSESSMENT — ACTIVITIES OF DAILY LIVING (ADL)
DRESS: INDEPENDENT
LAUNDRY: WITH SUPERVISION
HYGIENE/GROOMING: INDEPENDENT
ORAL_HYGIENE: INDEPENDENT

## 2021-07-19 NOTE — PROGRESS NOTES
"[facility]  Occupational Therapy Behavioral Health Assessment    Patient Name: Maddy Ortiz    Description: OT staff met with Maddy to review the role of occupational therapy and explain the value of having them involved in their treatment plan including options to meet current needs/self-identified goals. The below evaluation is a compilation of functional performance observation and information obtained from an OT self-assessment.     Clinical impression through direct observation:       07/19/21 1300   General Information   Date Initially Attended OT 07/19/21   Clinical Impression   Affect Excessive, manic   Appearance and ADLs General cleanliness observed in most areas   Attention to Internal Stimuli No observed signs   Ability to Communicate Needs Independent   Verbal Content Hyperverbal   Participation Independently participates   Concentration Concentrates 5-10 minutes   Ability to Concentrate Easily distracted   Organization Independently organizes simple tasks   Decision Making Changes mind frequently     Self-reported answers on Occupational Assessment:    Patient identified these emotional, physical or mental health concerns: \"I had so much difficulty with concentrating\"    Patient anisha with these concerns: \"the right medications\"    Patient enjoys spending time with: \"friends\"    Identified enjoyment in activities: \"cards, crafts, decor\"   Stressors or changes in the past year: Not answered    Patient identified values: \"forgiveness, harmony, love\"    Patient's goal for the future is \"keep decorating my apartment\"     Patient indicated success in: (Bolded items indicate activities the pt selected)   Time spent with family or friends  Relaxing and enjoying myself  Having a satisfying routine  Work or volunteering   Concentrating on my tasks     Living independently  Taking care of the place where I live  Transportation  Managing my finances  Managing my basic needs (food, medicine, sleep)  Learning " "new things  Ability to pursue my goals  Other:    Patient indicated barriers to success are: (Bolded items indicate activities the pt selected)   Difficulty concentrating  Memory problems  Physical health/Chronic Pain  Lacks support  Motivation/mood  Finances  Using drugs or alcohol  Sleeping too much or not enough  Missing work/appointments  Bothered by lights or sounds in the environment  Bothered by touch, texture, or movement  Lack of satisfying daily routine   Living environment  Transportation  Other: \"learning difficulty\"       Patient indicated these supports: (Bolded items indicate activities the pt selected)   Safe place to live  Family, friends or caregivers to help out when needed  A best friend or significant other  Pets  Belief in myself and abilities  Routines and rituals that support my wellness  Access to email, social media, phone calls  Leisure supplies to support my interests and hobbies  Professionals: , Therapist, etc.  Other:     Goals selected by patient to work on with OT: (Bolded items indicate activities the pt selected)   Have hope for the future  Feel better  Learn ways to stay well and avoid coming to the hospital  Share my thoughts and feelings  Feel more confident  Improve my sleep  *Improve my concentration  *Relax and enjoy myself  Improve my relationships with others  Handle my frustrations  Develop a satisfying daily routine  Manage my physical pain  Explore use of sensory coping strategies  Other:          Assessment: Maddy has notable strengths including good social skills, motivation for treatment, proven resilience through adversity, opportunities to live a meaningful life, optimism that change can occur, kindness to others. Due to those strengths, Maddy may benefit from continue engagement in OT groups that support healthy recovery, specifically exploration of positive coping skills for symptom management/relapse prevention.        Plan: Initiate care plan goals " and interventions.    Maddy participated in goal(s) selection and understands the plan of care: Yes    IP OT Goal:     With MIN A, pt will demonstrate illness management skills necessary for positive resumption of home and community roles by discharge.     Pt will engage in 30 minutes of functional activity with 1 verbal cue on 2 consecutive occasions by discharge, in order to prepare for resumption of daily roles      Plan for Next Treatment: Provide graded occupation-based activities for increased success and ongoing assessment.     Yaima Melendez on 7/19/2021 at 1:03 PM

## 2021-07-19 NOTE — PLAN OF CARE
Assessment/Intervention/Current Symtoms and Care Coordination    Attended team meeting and reviewed chart notes.    Pt will have blood draw for lithium.  The team will contact pt's brother for additional information.      Discharge Plan or Goal  Return to home      Barriers to Discharge   Pt needs stabilization      Referral Status  None made      Legal Status  voluntary

## 2021-07-19 NOTE — PLAN OF CARE
BEHAVIORAL TEAM DISCUSSION    Participants: Dr. Plascencia, resident Gnee Frank, RN Antonia Bacon, CTC Ewelina Lake, OT Robyn Guajardo   Progress: moderate  Anticipated length of stay: 5-7  Continued Stay Criteria/Rationale: pt continues to need stabilization  Medical/Physical: see IM consult  Precautions:   Behavioral Orders   Procedures     Code 1 - Restrict to Unit     Discontinue 1:1 attendant for suicide risk     Order Specific Question:   I have performed an in person assessment of the patient     Answer:   Based on this assessment the patient no longer requires a one on one attendant at this point in time.     Order Specific Question:   Rationale     Answer:   Medical Record Reviewed     Order Specific Question:   Rationale     Answer:   Patient States able to remain safe in hospital     Order Specific Question:   Rationale     Answer:   Modifications to care environment made to mitigate safety risk     Order Specific Question:   Rationale     Answer:   Routine observations are sufficient to monitor safety.     Routine Programming     As clinically indicated     Self Injury Precaution     Status 15     Every 15 minutes.     Suicide precautions     Patients on Suicide Precautions should have a Combination Diet ordered that includes a Diet selection(s) AND a Behavioral Tray selection for Safe Tray - with utensils, or Safe Tray - NO utensils       Plan: manage medications per psychiatry, staff to provide safe environment and therapeutic milieu, ctc to ensure proper follow up in place and assist when needed.  Rationale for change in precautions or plan: no change
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
## 2021-07-19 NOTE — PROGRESS NOTES
Outpatient Physical Therapy Discharge Note     Patient: Maddy Ortiz  : 1985    Beginning/End Dates of Reporting Period:  21 to 6/10/21    Referring Provider: Brunfelt    Therapy Diagnosis: PFM weakness     Client Self Report: Pt relates PF is good. Not sure if she has had leaks. Did not have time HEP. Pt is going to sign up for a mediation class. Pt relates PFM are the same but back pain is a little less. Pt relates encurance is her biggest problem    Objective Measurements:  Objective Measure: Pelvic Position  Details: neutral pelvis    Objective Measure: Lumbar ROM  Details: flex: 10 in from floor. ext: 100% - no pain - mod lumbar lordosis    Objective Measure: SLR  Details: R:90 deg  L: 70 deg     Objective Measure: Pelvic Exam/Palpation   Details: Pt consented and declines 2nd person: Resting tone noted with pelvic exam and pt TTP B cocygeus, L pubucoccygeus,  and B obturtor internus, L worse than R.     Objective Measure: PERF  Details: NT          Goals:  Goal Identifier stress   Goal Description Pt will relates leaking less than 1/5 times w cough sneeze   Target Date 21   Date Met      Progress (detail required for progress note):pt unsure     Goal Identifier fluid intake   Goal Description Pt will drink approx 100 oz of fluid per day with approx 2/3 being water to reduce bladder irritation and urgency   Target Date 21   Date Met      Progress (detail required for progress note):not assessed at last visit     Goal Identifier HEP   Goal Description Pt will be ind in HEP to prevent return of symptoms   Target Date 07/15/21   Date Met      Progress (detail required for progress note):not assessed at last visit     Goal Identifier Dry   Goal Description Pt will report staying dry 12/14 days   Target Date 07/15/21   Date Met      Progress (detail required for progress note):pt relates unsure          Progress this reporting period: Pt was seen for 3 visits in physical therapy over this  POC. Objective measures are all taken from last visit. At time of last visit continued to work on biofeedback and strengthening.  Pt has failed to schedule f/u visits within 30 days from last visit as instructed thus is being d/c from therapy at this time. Current status is unknown at this time.      Plan:  Discharge from therapy.    Discharge:    Reason for Discharge: Patient has failed to schedule further appointments.    Equipment Issued: NA    Discharge Plan: Patient to continue home program.

## 2021-07-19 NOTE — PLAN OF CARE
"  Problem: Behavioral Health Plan of Care  Goal: Plan of Care Review  Outcome: Improving  Flowsheets (Taken 7/19/2021 0951)  Plan of Care Reviewed With: patient  Progress: improving  Patient Agreement with Plan of Care: agrees     Problem: Cognitive Impairment (Psychotic Signs/Symptoms)  Goal: Optimal Cognitive Function (Psychotic Signs/Symptoms)  Outcome: Improving  Flowsheets (Taken 7/19/2021 0951)  Mutually Determined Action Steps (Optimal Cognitive Function): participates in problem resolution     Problem: Mood Impairment (Psychotic Signs/Symptoms)  Goal: Improved Mood Symptoms (Psychotic Signs/Symptoms)  Outcome: Improving  Flowsheets (Taken 7/19/2021 0951)  Mutually Determined Action Steps (Improved Mood Symptoms): acknowledges progress    Maddy awake more this AM-did c/o continued indigestion this AM-Received Maalox 30 ml at 0738 which was effective in relieving indigestion-Maddy approached desk asking for information about a mask that would be effective in protecting her daughter from drugs used by neighbors-c/o smoke and odors in [place] and seeping into their apt from apt next door-states she is very angry about this and fears it will cause her daughter to be infertile-reassured this would not happen-reviewed possible use of air filter to reduce smoke that may enter apt-Maddy agreeable to this solution and accepted reassurance would not cause daughter to be infertile-[id number] Note found on dining room floor signed by Maddy and addressed to male pt which states, \"Jemima my daughter might like to have you as a friend. Her phone # is (number given). My # is (number given). Maddy\"-Daughter is twelve yrs  "

## 2021-07-19 NOTE — PLAN OF CARE
Pt appeared to sleep 5.5 hours. Pt up pacing in room for a short period of time. Pt complained of heart burn, and anxiety, and appeared restless,  given PRN maalox and PRN hydroxyzine @0357 and returned to bed.     Problem: Sleep Disturbance  Goal: Adequate Sleep/Rest  Outcome: Improving

## 2021-07-19 NOTE — PLAN OF CARE
"   Patient spent time between room and lounge, interacting with peers and socializing with select peer.  Pt approached Nurses' station after dinner, \"Can I see my legal order to be here... cause I kind of feel like I should go home.\"  Patient sharing printout with select peer.  "

## 2021-07-20 PROCEDURE — 124N000002 HC R&B MH UMMC

## 2021-07-20 PROCEDURE — 90853 GROUP PSYCHOTHERAPY: CPT

## 2021-07-20 PROCEDURE — 250N000013 HC RX MED GY IP 250 OP 250 PS 637: Performed by: STUDENT IN AN ORGANIZED HEALTH CARE EDUCATION/TRAINING PROGRAM

## 2021-07-20 PROCEDURE — G0177 OPPS/PHP; TRAIN & EDUC SERV: HCPCS

## 2021-07-20 PROCEDURE — 99232 SBSQ HOSP IP/OBS MODERATE 35: CPT | Mod: GC | Performed by: PSYCHIATRY & NEUROLOGY

## 2021-07-20 PROCEDURE — 250N000013 HC RX MED GY IP 250 OP 250 PS 637

## 2021-07-20 RX ADMIN — ALUMINUM HYDROXIDE, MAGNESIUM HYDROXIDE, AND SIMETHICONE 30 ML: 200; 200; 20 SUSPENSION ORAL at 03:57

## 2021-07-20 RX ADMIN — Medication 1 CAPSULE: at 07:55

## 2021-07-20 RX ADMIN — CARIPRAZINE 6 MG: 1.5 CAPSULE, GELATIN COATED ORAL at 21:07

## 2021-07-20 RX ADMIN — ALUMINUM HYDROXIDE, MAGNESIUM HYDROXIDE, AND SIMETHICONE 30 ML: 200; 200; 20 SUSPENSION ORAL at 16:30

## 2021-07-20 RX ADMIN — ALUMINUM HYDROXIDE, MAGNESIUM HYDROXIDE, AND SIMETHICONE 30 ML: 200; 200; 20 SUSPENSION ORAL at 12:07

## 2021-07-20 RX ADMIN — LITHIUM CARBONATE 1200 MG: 300 TABLET, EXTENDED RELEASE ORAL at 21:08

## 2021-07-20 RX ADMIN — BENZTROPINE MESYLATE 0.5 MG: 0.5 TABLET ORAL at 21:11

## 2021-07-20 RX ADMIN — HYDROXYZINE HYDROCHLORIDE 25 MG: 25 TABLET, FILM COATED ORAL at 03:57

## 2021-07-20 RX ADMIN — LEVONORGESTREL AND ETHINYL ESTRADIOL 1 TABLET: KIT at 07:56

## 2021-07-20 RX ADMIN — OMEPRAZOLE 20 MG: 20 CAPSULE, DELAYED RELEASE ORAL at 07:55

## 2021-07-20 ASSESSMENT — MIFFLIN-ST. JEOR: SCORE: 1704.67

## 2021-07-20 ASSESSMENT — ACTIVITIES OF DAILY LIVING (ADL)
LAUNDRY: WITH SUPERVISION
DRESS: INDEPENDENT
HYGIENE/GROOMING: INDEPENDENT
ORAL_HYGIENE: INDEPENDENT

## 2021-07-20 NOTE — PLAN OF CARE
Problem: Behavioral Health Plan of Care  Goal: Plan of Care Review  Outcome: Improving  Flowsheets (Taken 7/20/2021 1011)  Plan of Care Reviewed With: patient  Progress: improving  Patient Agreement with Plan of Care: agrees     Problem: Cognitive Impairment (Psychotic Signs/Symptoms)  Goal: Optimal Cognitive Function (Psychotic Signs/Symptoms)  Outcome: Improving  Flowsheets (Taken 7/20/2021 1011)  Mutually Determined Action Steps (Optimal Cognitive Function): participates in problem resolution     Problem: Mood Impairment (Psychotic Signs/Symptoms)  Goal: Improved Mood Symptoms (Psychotic Signs/Symptoms)  Outcome: Improving  Flowsheets (Taken 7/20/2021 1011)  Mutually Determined Action Steps (Improved Mood Symptoms): acknowledges progress     Maddy active on unit today-social with staff and peers-[id number] Maddy attended grps today-appears, and states she feels, calmer-states she is planning discharge tomorrow, although she believes her medications need to be increased, feels this can be done outpt basis-

## 2021-07-20 NOTE — PLAN OF CARE
Problem: General Rehab Plan of Care  Goal: Occupational Therapy Goals  Description: With MIN A, pt will demonstrate illness management skills necessary for positive resumption of home and community roles by discharge.     Pt will engage in 30 minutes of functional activity with 1 verbal cue on 2 consecutive occasions by discharge, in order to prepare for resumption of daily roles    Daily note:  Pt attended 1 of 3 OT groups today. Pt participated in AM OT clinic IND, where she initiated a chosen project (drawing), followed through with plan, and asked for support with supplies as needed. Pt kept to herself and maintained attention to a single task for 30+ minutes. Pt was pleasant throughout.    Outcome: Improving

## 2021-07-20 NOTE — PLAN OF CARE
"Pt appeared to sleep 5 hours. Pt c/o indigestion and anxiety, saying \"can I have something to calm me down\" PRN maalox and hydroxyzine given @0357 with improvement. Pt returned to bed and snores while sleeping.     Problem: Sleep Disturbance  Goal: Adequate Sleep/Rest  Outcome: Improving     "

## 2021-07-20 NOTE — PLAN OF CARE
Number of patients attending the group:  5  Group Length:  0.5 Hours    Group Therapy     Summary of Group / Topics Discussed:      The  Psychotherapy group goal is to promote insight to positive choice and change. Group processing is within a supportive and safe environment. Patients will process emotions using verbal group and expressive psychotherapy interventions.    Group Topic - Letting Go of Stress - DVD with guided acupressure and self-massage, simple stretches, deep breathing and progressive relaxation followed by group discussion.        Assessment - attended end of the group only, was able to appropriately participate when present.         Patient Response- attended 5 - 10 minutes end of group - was able to participate appropriately when present.

## 2021-07-20 NOTE — PLAN OF CARE
Assessment/Intervention/Current Symtoms and Care Coordination  WR attended team and reviewed chart.   WR scheduled f/u psych and added to AVS.   Pt to discharge tomorrow.     Discharge Plan or Goal  Return to home discharge tomorrow     Barriers to Discharge   Needs to further stabilize     Referral Status  None     Legal Status  Voluntary

## 2021-07-20 NOTE — PROGRESS NOTES
"  ----------------------------------------------------------------------------------------------------------  [facility], [place]   Psychiatric Progress Note  Hospital Day #7     Interim History:   The patient's care was discussed with the treatment team and chart notes were reviewed.    Sleep 5 hours (07/20/21 0600)  Scheduled Medications: compliant with Vraylar 6mg and Lithium 1200 mg PO at bedtime. Lactobacillus rhamnosus, Aviane.  PRN medications: PRN maalox and hydroxyzine 25mg given @0357     Staff Report:     \"Patient spent time between room and lounge, interacting with peers and socializing with select peer.  Pt approached Nurses' station after dinner, \"Can I see my legal order to be here... cause I kind of feel like I should go home.\"  Patient sharing printout with select peer.\"    \"Pt appeared to sleep 5 hours. Pt c/o indigestion and anxiety, saying \"can I have something to calm me down\" PRN maalox and hydroxyzine given @0357 with improvement. Pt returned to bed and snores while sleeping.\"        Patient Interview:   Maddy Ortiz states that she feels like she's \"ready to go home\" because shes \"had enough time here\" and \"on the right meds\". Discussed lithium level and increasing dose and she denied any new side effects. In talking about discharging home, she denied sofía. We discussed the note that she had written to another patient about being her 12 year old daughters friend and she believes that it was appropriate because \"she doesn't have friends that are not like her\". In discussing her plan when she discharges, she states that she can \"take any meds PRN when I'm manic and check into a hotel\". She further endorses that there is no reason for her to be here and says that shes \"anxious\" about being here. Open to staying until tomorrow to give us time to \"put a plan together.\" Patient still hyper-verbal and disorganized but significantly improved from admission.    The risks, " "benefits, alternatives and side effects of any medication changes have been discussed and are understood by the patient and other caregivers.    Review of systems:     ROS was negative unless noted above.          Allergies:     Allergies   Allergen Reactions     Animal Dander      Other reaction(s): *Unknown     Metformin Fatigue     Mold      Other reaction(s): Runny Nose     Trees             Psychiatric Examination:   /84 (BP Location: Left arm)   Pulse 98   Temp 98.5  F (36.9  C) (Tympanic)   Resp 14   Ht 1.626 m (5' 4\")   Wt 103.7 kg (228 lb 11.2 oz)   SpO2 97%   BMI 39.26 kg/m    Weight is 228 lbs 11.2 oz  Body mass index is 39.26 kg/m .    MENTAL STATUS EXAM    Appearance:  no apparent distress, normal posture and appropriately dressed in personal clothing  Attitude:  engaged  Psychomotor:  no evidence of tics, dystonia, or tardive dyskinesia  Eye Contact: appropriate, occasionally looking upward  Speech:  fluent English, normal tone and increased rate  Mood: \"ok\"  Affect:  congruent  Thought Content: preoccupations about the safety of her apartment complex  Thought Process: goal directed  Sensorium: awake and alert  Cognition: memory grossly intact  Impulse control: fair  Insight: fair  Judgment: fair         Labs:     Results for orders placed or performed during the hospital encounter of 21 (from the past 24 hour(s))   Lithium level   Result Value Ref Range    Lithium 0.5   mmol/L        Assessment  & Plan      Assessment:   Maddy Ortiz is a 35 year old female with history of schizoaffective disorder, generalized anxiety disorder, delusional disorder, and concern for cluster B traits, who presented to [facility] ED on 2021 due to psychosis. This is in the context of recent psychosocial stressors of unemployment and individual at [facility] who recently . Patient also has chronic stressors of mental illness, personal adoption, unstable relationship with father of daughter. " Further history is necessary for full determination of events leading to hospitalization. Patient was disorganized on interview, and heavily fatigued. Presentation could be consistent with schizoaffective disorder vs. Delusional disorder. Fatigue likely 2/2 resolution of sofía symptoms, although difficult to ascertain due to patient ability to cooperate with interview.       Reason for inpatient hospitalization is disorganized behavior and suicidal ideation. Disposition pending clinical stabilization, medication optimization, and formulation of safe discharge plan.      Psychiatric Hospital course:   Maddy Ortiz was admitted to [facility] as a voluntary patient. Her  PTA sertraline was held due to patient's concern that it was contributing to her sofía. PTA Vraylar 6mg and melatonin 3mg were continued. 10 mg Olanzapine PO was given on 7/13/21 at 2135 for severe agitation/psychosis. 7/14 pt agreed to start mood stabilizer Lithium. 7/15 pt agreed to increase Lithium dose to 900mg at bedtime. Lithium level on 7/19 was 0.5mmol/L. 7/19 pt agreed to increase lithium dose to 1200 mg at bedtime.    Principal Diagnosis:   #Schizoaffective disorder, bipolar type  - Lithium 1200 mg PO at bedtime.   -Vraylar 6mg PO at bed time     Secondary psychiatric & Medical diagnoses of concern this admission:   # Generalized anxiety disorder  - Hydroxyzine 25 mg Q4hrs PRN    PRN  Medications  - Hydroxyzine 25 mg Q4hrs PRN   - Olanzapine 10 mg PO/IM TID PRN severe agitation/psychosis  - Melatonin 3mg po at bedtime PRN  - Acetaminophen 650 mg po Q4hrs PRN pain     Other medications:  Omeprazole 20 mg PO every AM before breakfast     Legal Status:   Orders Placed This Encounter      Voluntary      Safety Assessment:   Behavioral Orders   Procedures     Code 1 - Restrict to Unit     Discontinue 1:1 attendant for suicide risk     Order Specific Question:   I have performed an in person assessment of the patient     Answer:   Based on this  assessment the patient no longer requires a one on one attendant at this point in time.     Order Specific Question:   Rationale     Answer:   Medical Record Reviewed     Order Specific Question:   Rationale     Answer:   Patient States able to remain safe in hospital     Order Specific Question:   Rationale     Answer:   Modifications to care environment made to mitigate safety risk     Order Specific Question:   Rationale     Answer:   Routine observations are sufficient to monitor safety.     Routine Programming     As clinically indicated     Self Injury Precaution     Status 15     Every 15 minutes.     Suicide precautions     Patients on Suicide Precautions should have a Combination Diet ordered that includes a Diet selection(s) AND a Behavioral Tray selection for Safe Tray - with utensils, or Safe Tray - NO utensils         Disposition:  Pending stabilization & development of a safe discharge plan.     Patient will be treated in therapeutic milieu with appropriate individual and group therapies as described.  The patient was seen and the plan was discussed with the attending physician.     Isaiah Fonseca, MS3    Gene Frank MD  Psychiatry PGY-1 Resident   Pager:  [phone]    Resident attestation:  I was present with the medical/GUERO student who participated in the service and in the documentation of the note.  I have verified the history and medical decision making.  I agree with the assessment and plan of care as documented in the note.    Attestation:  This patient has been seen and evaluated by me, Marian Plascencia MD.  I have discussed this patient with the house staff team including the resident and medical student and I agree with the findings and plan in this note.    I have reviewed today's vital signs, medications, labs and imaging. Marian Plascencia MD , PhD.

## 2021-07-20 NOTE — PLAN OF CARE
"Nursing plan of care   Problem: Behavioral Health Plan of Care  Goal: Develops/Participates in Therapeutic Weed to Support Successful Transition  Outcome: Improving   Pt was active and visible in the milieu; attended group and pleasant on approach; excited about her discharge tomorrow, but stated,\" I should have to go before my welcome runs out\" ; denied SI/SIB,AVH and depression; endorsed little anxiety; good appetite and eye contact; reported indigestion and received PRN  Maalox at 1630; received PRN cogentin C/O Cariprazine keeping her awake at night; medication compliant; denied medication side effect; reported feeling dizziness after HS med and VS /81, pulse 114;  will continue to monitor and assess.    "

## 2021-07-20 NOTE — PROGRESS NOTES
Maddy participated in OT clinic this evening, where she initiated a chosen project (collage), followed through with plan, and asked for support with supplies as needed. Agreeable and focused on task.      07/19/21 2000   Occupational Therapy   Type of Intervention structured groups   Response Initiates, socially acceptable   Hours 1

## 2021-07-21 VITALS
BODY MASS INDEX: 38.76 KG/M2 | DIASTOLIC BLOOD PRESSURE: 89 MMHG | HEART RATE: 93 BPM | TEMPERATURE: 98.3 F | HEIGHT: 64 IN | SYSTOLIC BLOOD PRESSURE: 123 MMHG | RESPIRATION RATE: 16 BRPM | WEIGHT: 227 LBS | OXYGEN SATURATION: 96 %

## 2021-07-21 LAB — SARS-COV-2 RNA RESP QL NAA+PROBE: NEGATIVE

## 2021-07-21 PROCEDURE — U0003 INFECTIOUS AGENT DETECTION BY NUCLEIC ACID (DNA OR RNA); SEVERE ACUTE RESPIRATORY SYNDROME CORONAVIRUS 2 (SARS-COV-2) (CORONAVIRUS DISEASE [COVID-19]), AMPLIFIED PROBE TECHNIQUE, MAKING USE OF HIGH THROUGHPUT TECHNOLOGIES AS DESCRIBED BY CMS-2020-01-R: HCPCS | Performed by: PSYCHIATRY & NEUROLOGY

## 2021-07-21 PROCEDURE — 250N000013 HC RX MED GY IP 250 OP 250 PS 637: Performed by: STUDENT IN AN ORGANIZED HEALTH CARE EDUCATION/TRAINING PROGRAM

## 2021-07-21 PROCEDURE — G0177 OPPS/PHP; TRAIN & EDUC SERV: HCPCS

## 2021-07-21 PROCEDURE — 99238 HOSP IP/OBS DSCHRG MGMT 30/<: CPT | Mod: GC | Performed by: PSYCHIATRY & NEUROLOGY

## 2021-07-21 PROCEDURE — 250N000013 HC RX MED GY IP 250 OP 250 PS 637

## 2021-07-21 RX ORDER — LITHIUM CARBONATE 300 MG/1
1200 TABLET, FILM COATED, EXTENDED RELEASE ORAL AT BEDTIME
Qty: 120 TABLET | Refills: 0 | Status: ON HOLD | OUTPATIENT
Start: 2021-07-21 | End: 2022-01-11 | Stop reason: HOSPADM

## 2021-07-21 RX ORDER — LANOLIN ALCOHOL/MO/W.PET/CERES
3 CREAM (GRAM) TOPICAL
Qty: 30 TABLET | Refills: 0 | Status: SHIPPED | OUTPATIENT
Start: 2021-07-21 | End: 2021-07-21

## 2021-07-21 RX ADMIN — ALUMINUM HYDROXIDE, MAGNESIUM HYDROXIDE, AND SIMETHICONE 30 ML: 200; 200; 20 SUSPENSION ORAL at 14:09

## 2021-07-21 RX ADMIN — ALUMINUM HYDROXIDE, MAGNESIUM HYDROXIDE, AND SIMETHICONE 30 ML: 200; 200; 20 SUSPENSION ORAL at 02:53

## 2021-07-21 RX ADMIN — OMEPRAZOLE 20 MG: 20 CAPSULE, DELAYED RELEASE ORAL at 08:06

## 2021-07-21 RX ADMIN — Medication 1 CAPSULE: at 08:06

## 2021-07-21 RX ADMIN — LEVONORGESTREL AND ETHINYL ESTRADIOL 1 TABLET: KIT at 08:06

## 2021-07-21 RX ADMIN — HYDROXYZINE HYDROCHLORIDE 25 MG: 25 TABLET, FILM COATED ORAL at 02:53

## 2021-07-21 ASSESSMENT — ACTIVITIES OF DAILY LIVING (ADL)
ORAL_HYGIENE: INDEPENDENT
HYGIENE/GROOMING: INDEPENDENT
DRESS: INDEPENDENT
LAUNDRY: WITH SUPERVISION

## 2021-07-21 NOTE — PROGRESS NOTES
"Number of patients attending the group: 7  Group Length:  1 Hour     Group Therapy   Summary of Group / Topics Discussed:  The psychotherapy group goal is to promote insight to positive choice and change. Group processing is within a supportive and safe environment. Patients processed emotions using verbal group and expressive psychotherapy interventions. This group focused on coaching patients on ways to identify a particular problem area, especially in coping with mental health symptoms. Group members and writers took turn to provide feedback on healthy ways to cope with the identified problem behaviors or mental health challenges.      Assessment: This patient participated in the group. She provided feedback to other group members and reported his own challenges. She listened as group members offered her feedback, and responded in receipt of such recommendations. She presented with a congruent affect and with good eye contact. At the end of the group, she joined other group members in validating at least one other group member, by identifying one positive thing about another group member.     Patient Response: During this session, pt identified her main challenge as \"generally working with mental health challenges and medication management.\" Pt received feedback focusing on these targeted outcomes. She voiced understanding of the feedback and strategies discussed and reported willingness to practice the strategies.   "

## 2021-07-21 NOTE — DISCHARGE SUMMARY
Psychiatric Discharge Summary    Hospital Day #8    Maddy Ortiz MRN# [id number]   Age: 35 year old YOB: 1985     Date of Admission:  2021  Date of Discharge:  2021  Admitting Physician:  Marian Plascencia MD  Discharge Physician:  Marian Plascencia MD         Event Leading to Hospitalization:   Maddy Ortiz is a 35 year old female with history of schizoaffective disorder, generalized anxiety disorder, delusional disorder, and concern for cluster B traits, who presented to [facility] ED on 2021 due to psychosis. This is in the context of recent psychosocial stressors of unemployment and [facility] [place] . Patient also has chronic stressors of mental illness, personal adoption, unstable relationship with father of daughter. Further history is necessary for full determination of events leading to hospitalization. Patient was disorganized on interview, and heavily fatigued. Presentation could be consistent with schizoaffective disorder vs. Delusional disorder. Fatigue likely 2/2 resolution of sofía symptoms, although difficult to ascertain due to patient ability to cooperate with interview.         See Admission note by Marian Cardoso MD on 21 for additional details.          Diagnoses:   Primary Psychiatric Diagnosis  #Schizoaffective disorder, Bipolar type  - Lithium 1200 mg PO at bedtime.   -Lithium levels due on , outpatient   -Vraylar 6mg PO at bed time       Secondary Psychiatric Diagnosis  #Generalized anxiety disorder  - Hydroxyzine 25 mg Q4hrs PRN    Diagnostic Impression:   This patient is a 35 year old female with history of schizoaffective disorder, generalized anxiety disorder, delusional disorder, and concern for cluster B traits, who presented to [facility] ED on 2021 due to psychosis. This is in the context of recent psychosocial stressors of unemployment and [facility] at [facility] [place] recently . Patient also has chronic stressors  of mental illness, personal adoption, unstable relationship with father of daughter. Patient was disorganized on interview, and heavily fatigued. Presentation could be consistent with schizoaffective disorder vs. Delusional disorder. Fatigue likely 2/2 resolution of sofía symptoms, although difficult to ascertain due to patient ability to cooperate with interview at the time. Initially patient was hyper-verbal, irritable at times, engaging with disorganized ideas/ planning through hyper-graphia. Patient was grossly disorganized with loosened associations and delusions. Pt endorsed SI without a plan.      Reason for inpatient hospitalization was disorganized behavior and suicidal ideation. Throughout her hospitalization patient symptoms improved , medication optimization still in process and follow up is strongly advised. Formulation of safe discharge plan was coordinated and put in place.          Consults:   None         Hospital Course:   Psychiatric Course:  Maddy Ortiz was admitted to [facility]  with attending Marian Plascencia MD as a voluntary patient. Her  PTA sertraline was held due to patient's concern that it was contributing to her sofía. PTA Vraylar 6mg and melatonin 3mg were continued. 10 mg Olanzapine PO was given at ED on 7/13/21 at 2135 for severe agitation/psychosis. 7/14 pt agreed to start mood stabilizer Lithium. 7/15 pt agreed to increase Lithium dose to 900mg at bedtime. Lithium level on 7/19 was 0.5mmol/L. 7/19 pt agreed to increase lithium dose to 1200 mg at bedtime. 7/20 patient reported tolerating medication well. Plan to discharge on 7/21 was proposed and put on place. Pt discharged on  7/21    Initial presentation consistent with a manic episode in the context of schizoaffective disorder, bipolar type.  At the time of discharge, her symptoms of sofía had improved significantly and she displayed greater insight into her condition and desire to work on wellbeing.     Medical Course:  Lab  results on 7/14/21 at 0834 were notable for increased triglycerides, LDL cholesterol, and glucose as well as a low HDL. Outpatient follow up recommended COVID-19 Virus RT-PCR test was negative on 7/21/21 at 1132.    Risk Assessment:      Today Maddy Ortiz denies SI, SIB and HI. No overt evidence of psychosis or sofía observed. Patient grossly appears to be cognitively intact. Insight and judgement have improved since admission. Patient reports anxiety though denies manic symptoms.  Patient is aware of consequences of medication non-adherance . Patient expresses understanding of her condition and willingness to take medication and work with ACT team. Patient has not exhibited aggressive or violence behaviors since prior to this admission. Throughout patient's stay they were compliant to medication.  She has notable risk factors for self-harm, including comorbid medical condition of schizoaffective disorder. However, risk is mitigated by absence of past attempts. Therefore, based on all available evidence including the factors cited above, she does not appear to be at imminent risk for self-harm, does not meet criteria for a 72-hr hold, and therefore remains appropriate for ongoing outpatient level of care. Patient agreed to further reduce risk of self-harm by seeking help from publicly available resources like crisis phone lines  when needed and agreed to remain medication adherent. Additional steps taken to minimize risk include: medication optimization, close psychiatric follow up and provision of crisis resources including working with her ACT team, history of seeking help in the past when needed and good family support, especially her brother Alexander who is currently taking care of her 11 yo daughter per pt request. Voluntary referral for day treatment was offered, she accepted this offer. Patient expressed understanding of risk associated with medication non-adherance including increased risk of harm to self or  others.     Maddy was released to home. During this admission, she did participate in groups and was visible in the milieu, and her symptoms of sofía improved. At the time of discharge she was determined to not be a danger to herself or others.     This document serves as a transfer of care to Maddy Ortiz's outpatient providers.         Discharge Medications:     Current Discharge Medication List      START taking these medications    Details   lithium ER (LITHOBID) 300 MG CR tablet Take 4 tablets (1,200 mg) by mouth At Bedtime  Qty: 120 tablet, Refills: 0    Comments: Lithium levels due on 7/24. Follow-up outpatient  Associated Diagnoses: Bipolar 1 disorder (H)      omeprazole (PRILOSEC) 20 MG DR capsule Take 1 capsule (20 mg) by mouth every morning (before breakfast)  Qty: 30 capsule, Refills: 0    Associated Diagnoses: Gastroesophageal reflux disease without esophagitis         CONTINUE these medications which have NOT CHANGED    Details   albuterol (PROAIR HFA/PROVENTIL HFA/VENTOLIN HFA) 108 (90 Base) MCG/ACT inhaler Inhale 2 puffs into the lungs every 4 hours as needed for wheezing or shortness of breath / dyspnea  Qty: 18 g, Refills: 3    Comments: Pharmacy may dispense brand covered by insurance (Proair, or proventil or ventolin or generic albuterol inhaler)  Associated Diagnoses: Shortness of breath      benztropine (COGENTIN) 0.5 MG tablet Take 0.5 mg by mouth daily as needed       EPINEPHrine (EPIPEN/ADRENACLICK/OR ANY BX GENERIC EQUIV) 0.3 MG/0.3ML injection 2-pack Inject 0.3 mLs (0.3 mg) into the muscle as needed for anaphylaxis  Qty: 0.6 mL, Refills: 3    Associated Diagnoses: Need for desensitization to allergens      hydrOXYzine (ATARAX) 10 MG tablet Take 1 tablet (10 mg) by mouth daily as needed for itching (nasal al,lergy symptoms)  Qty: 90 tablet, Refills: 1    Associated Diagnoses: Chronic seasonal allergic rhinitis due to pollen      lactobacillus rhamnosus, GG, (CULTURELL) capsule Take 1  capsule by mouth daily      levonorgestrel-ethinyl estradiol (AVIANE) 0.1-20 MG-MCG tablet Take 1 tablet by mouth daily  Qty: 84 tablet, Refills: 3    Associated Diagnoses: DUB (dysfunctional uterine bleeding)      !! Melatonin 10 MG TABS tablet Take 10 mg by mouth nightly as needed for sleep      !! melatonin 3 MG tablet Take 1 tablet (3 mg) by mouth nightly as needed for sleep  Qty: 30 tablet, Refills: 0    Associated Diagnoses: Insomnia, unspecified type      VRAYLAR 6 MG CAPS capsule Take 6 mg by mouth At Bedtime       !! ORDER FOR ALLERGEN IMMUNOTHERAPY Cat Hair, Standardized 10,000 BAU/mL, ALK  3.0 ml  Dog Hair Dander, A. P.  1:100 w/v, HS  1.0 ml  Dust Mites F 30,000AU/mL, HS  0.5 ml  Dust Mites P. 30,000 AU/mL, HS  0.5 ml   Diluent: HSA qs to 5ml  Qty: 5 mL, Refills: PRN    Associated Diagnoses: Allergic rhinitis due to animal dander; Allergic rhinitis due to dust mite      !! ORDER FOR ALLERGEN IMMUNOTHERAPY Alternaria Tenuis 1:10 w/v, HS  0.5 ml  Epicoccum Nigrum 1:10 w/v, HS 0.5 ml  Hormodendrum Cladosporioides 1:10 w/v, HS 0.5 ml  Diluent: HSA qs to 5ml  Qty: 5 mL, Refills: PRN    Associated Diagnoses: Allergic rhinitis due to mold      !! ORDER FOR ALLERGEN IMMUNOTHERAPY Estuardo, White  1:20 w/v, HS  0.5 ml  Birch Mix PRW 1:20 w/v, HS  0.5 ml  Elm, American 1:20 w/v, HS  0.5 ml  Hackberry 1:20 w/v, HS 0.5 ml  Hickory, Shagbark 1:20 w/v, HS  0.5 ml  [place] Mix RW 1:20 w/v, HS 0.5 ml  Oak Mix RVW 1:20 w/v, HS 0.5 ml  [place] Tree, Black 1:20 w/v, HS 0.5 ml  [place], Black 1:20 w/v, HS 0.5 ml  Diluent: HSA qs to 5ml  Qty: 5 mL, Refills: PRN    Associated Diagnoses: Chronic seasonal allergic rhinitis due to pollen      !! ORDER FOR ALLERGEN IMMUNOTHERAPY Kochia 1:20 w/v, HS 1.0 ml  Nettle 1:20 w/v, HS 1.0 ml  Plantain, English 1:20 w/v, HS 1.0 ml  Ragweed Mixed 1:20 w/v ALK  0.6 ml  Russian Thistle 1:20 w/v, HS 1.0 ml  Diluent: HSA qs to 5ml  Qty: 5 mL, Refills: PRN    Associated Diagnoses: Chronic seasonal  "allergic rhinitis due to pollen      terbinafine (LAMISIL AT) 1 % external cream Apply topically 2 times daily  Qty: 12 g, Refills: 1    Associated Diagnoses: Tinea pedis of both feet       !! - Potential duplicate medications found. Please discuss with provider.      STOP taking these medications       sertraline (ZOLOFT) 100 MG tablet Comments:   Reason for Stopping:                    Psychiatric Examination:   Appearance:  no apparent distress, normal posture, normal gait and appropriately dressed in personal clothing  Attitude:  cooperative and engaged  Psychomotor:  no evidence of tics, dystonia, or tardive dyskinesia  Eye Contact: appropriate  Speech:  fluent English, normal tone, increased rate and talkative  Mood: mood was \"frustrated but good\"  Affect:  congruent  Thought Content: denies suicidal ideation and denies homicidal ideation  Thought Process: goal directed  Sensorium: awake and alert  Cognition: memory grossly intact  Impulse control: fair  Insight: fair  Judgment: fair         Discharge Plan:   Psychiatric Appointments: none    Psychotherapy Appointments: none      Other Referrals:    Telephone Visit with KARLAERIN FAULKNER RD  Thursday Jul 22, 2021 4:00 PM    Medication Therapy Management Referral     Pt seen and discussed with my attending physician, Marian Plascencia MD.   Gene Frank MD  PGY-1 Psychiatry Resident    Attestation:  This patient has been seen and evaluated by me, Marian Plascencia MD.  I have discussed this patient with the house staff team including the resident and medical student and I agree with the findings and plan in this note.    I have reviewed today's vital signs, medications, labs and imaging. Marian Plascencia MD , PhD.          Appendix A: All Labs This Admission:     Results for orders placed or performed during the hospital encounter of 07/12/21   HCG qualitative urine (UPT)     Status: Normal   Result Value Ref Range    hCG Urine Qualitative Negative Negative   SARS-COV2 " (COVID-19) Virus RT-PCR     Status: Normal    Specimen: Nasopharyngeal; Swab   Result Value Ref Range    SARS CoV2 PCR Negative Negative    Narrative    Testing was performed using the sommer  SARS-CoV-2 & Influenza A/B Assay on the sommer  Tracie  System.  This test should be ordered for the detection of SARS-COV-2 in individuals who meet SARS-CoV-2 clinical and/or epidemiological criteria. Test performance is unknown in asymptomatic patients.  This test is for in vitro diagnostic use under the FDA EUA for laboratories certified under CLIA to perform moderate and/or high complexity testing. This test has not been FDA cleared or approved.  A negative test does not rule out the presence of PCR inhibitors in the specimen or target RNA in concentration below the limit of detection for the assay. The possibility of a false negative should be considered if the patient's recent exposure or clinical presentation suggests COVID-19.  [facility] are certified under the Clinical Laboratory Improvement Amendments of 1988 (CLIA-88) as qualified to perform moderate and/or high complexity laboratory testing.   Comprehensive metabolic panel     Status: Abnormal   Result Value Ref Range    Sodium 136 133 - 144 mmol/L    Potassium 4.2 3.4 - 5.3 mmol/L    Chloride 106 94 - 109 mmol/L    Carbon Dioxide (CO2) 24 20 - 32 mmol/L    Anion Gap 6 3 - 14 mmol/L    Urea Nitrogen 16 7 - 30 mg/dL    Creatinine 0.85 0.52 - 1.04 mg/dL    Calcium 9.2 8.5 - 10.1 mg/dL    Glucose 111 (H) 70 - 99 mg/dL    Alkaline Phosphatase 80 40 - 150 U/L    AST 45 0 - 45 U/L    ALT 59 (H) 0 - 50 U/L    Protein Total 7.7 6.8 - 8.8 g/dL    Albumin 3.9 3.4 - 5.0 g/dL    Bilirubin Total 0.3 0.2 - 1.3 mg/dL    GFR Estimate 88 >60 mL/min/1.73m2   Lipid panel     Status: Abnormal   Result Value Ref Range    Cholesterol 235 (H) <200 mg/dL    Triglycerides 356 (H) <150 mg/dL    Direct Measure HDL 34 (L) >=50 mg/dL    LDL Cholesterol Calculated 130 (H) <=100  mg/dL    Non HDL Cholesterol 201 (H) <130 mg/dL    Patient Fasting > 8hrs? Yes    TSH with free T4 reflex and/or T3 as indicated     Status: Normal   Result Value Ref Range    TSH 3.56 0.40 - 4.00 mU/L   Vitamin D     Status: Normal   Result Value Ref Range    Vitamin D, Total (25-Hydroxy) 42 20 - 75 ug/L    Narrative    Season, race, dietary intake, and treatment affect the concentration of 25-hydroxy-Vitamin D. Values may decrease during winter months and increase during summer months. Values 20-29 ug/L may indicate Vitamin D insufficiency and values <20 ug/L may indicate Vitamin D deficiency.    Vitamin D determination is routinely performed by an immunoassay specific for 25 hydroxyvitamin D3.  If an individual is on vitamin D2(ergocalciferol) supplementation, please specify 25 OH vitamin D2 and D3 level determination by LCMSMS test VITD23.     CBC with platelets and differential     Status: Abnormal   Result Value Ref Range    WBC Count 10.2 4.0 - 11.0 10e3/uL    RBC Count 4.59 3.80 - 5.20 10e6/uL    Hemoglobin 13.6 11.7 - 15.7 g/dL    Hematocrit 40.4 35.0 - 47.0 %    MCV 88 78 - 100 fL    MCH 29.6 26.5 - 33.0 pg    MCHC 33.7 31.5 - 36.5 g/dL    RDW 12.0 10.0 - 15.0 %    Platelet Count 266 150 - 450 10e3/uL    % Neutrophils 59 %    % Lymphocytes 28 %    % Monocytes 7 %    % Eosinophils 4 %    % Basophils 1 %    % Immature Granulocytes 1 %    NRBCs per 100 WBC 0 <1 /100    Absolute Neutrophils 6.1 1.6 - 8.3 10e3/uL    Absolute Lymphocytes 2.9 0.8 - 5.3 10e3/uL    Absolute Monocytes 0.7 0.0 - 1.3 10e3/uL    Absolute Eosinophils 0.4 0.0 - 0.7 10e3/uL    Absolute Basophils 0.1 0.0 - 0.2 10e3/uL    Absolute Immature Granulocytes 0.1 (H) <=0.0 10e3/uL    Absolute NRBCs 0.0 10e3/uL   Lithium level     Status: Normal   Result Value Ref Range    Lithium 0.5   mmol/L   SARS-COV2 (COVID-19) Virus RT-PCR     Status: Normal    Specimen: Nasopharyngeal; Swab   Result Value Ref Range    SARS CoV2 PCR Negative Negative     Narrative    Testing was performed using the Xpert Xpress SARS-CoV-2 Assay on the  Cepheid Gene-Xpert Instrument Systems. Additional information about  this Emergency Use Authorization (EUA) assay can be found via the Lab  Guide. This test should be ordered for the detection of SARS-CoV-2 in  individuals who meet SARS-CoV-2 clinical and/or epidemiological  criteria. Test performance is unknown in asymptomatic patients. This  test is for in vitro diagnostic use under the FDA EUA for  laboratories certified under CLIA to perform high complexity testing.  This test has not been FDA cleared or approved. A negative result  does not rule out the presence of PCR inhibitors in the specimen or  target RNA in concentration below the limit of detection for the  assay. The possibility of a false negative should be considered if  the patient's recent exposure or clinical presentation suggests  COVID-19. This test was validated by the [facility]. This laboratory is certified under  the Clinical Laboratory Improvement Amendments of 1988 (CLIA-88) as  qualified to perform high complexity laboratory testing.     Asymptomatic COVID-19 Virus (Coronavirus) by PCR Nasopharyngeal     Status: Normal    Specimen: Nasopharyngeal; Swab    Narrative    The following orders were created for panel order Asymptomatic COVID-19 Virus (Coronavirus) by PCR Nasopharyngeal.  Procedure                               Abnormality         Status                     ---------                               -----------         ------                     SARS-COV2 (COVID-19) Vir...[[id number]]  Normal              Final result                 Please view results for these tests on the individual orders.   Asymptomatic COVID-19 Virus (Coronavirus) by PCR Nasopharyngeal     Status: Normal    Specimen: Nasopharyngeal; Swab    Narrative    The following orders were created for panel order Asymptomatic COVID-19 Virus  (Coronavirus) by PCR Nasopharyngeal.  Procedure                               Abnormality         Status                     ---------                               -----------         ------                     SARS-COV2 (COVID-19) Vir...[[id number]]  Normal              Final result                 Please view results for these tests on the individual orders.   Urine Drugs of Abuse Screen     Status: None ()    Narrative    The following orders were created for panel order Urine Drugs of Abuse Screen.  Procedure                               Abnormality         Status                     ---------                               -----------         ------                     Drug abuse screen 1 urin...[[id number]]                                                   Please view results for these tests on the individual orders.   CBC with platelets differential     Status: Abnormal    Narrative    The following orders were created for panel order CBC with platelets differential.  Procedure                               Abnormality         Status                     ---------                               -----------         ------                     CBC with platelets and d...[[id number]]  Abnormal            Final result                 Please view results for these tests on the individual orders.

## 2021-07-21 NOTE — PLAN OF CARE
Maddy   Problem: Behavioral Health Plan of Care  Goal: Plan of Care Review  Outcome: Improving  Flowsheets (Taken 7/21/2021 1016)  Plan of Care Reviewed With: patient  Progress: improving  Patient Agreement with Plan of Care: agrees     Problem: Cognitive Impairment (Psychotic Signs/Symptoms)  Goal: Optimal Cognitive Function (Psychotic Signs/Symptoms)  Outcome: Adequate for Discharge  Flowsheets (Taken 7/21/2021 1016)  Mutually Determined Action Steps (Optimal Cognitive Function): participates in problem resolution     Maddy active on unit-states mood is good-has found new interests in OT that she plans to continue post discharge-packed belongings and cleaned room Addendum-Maddy discharged 1425 via medical transport to return to her apartment-reviewed medication schedule and outpt tx plans and verbalized understanding-has copy of AVS has 30 day supply of lithium and omeprazole in her possession

## 2021-07-21 NOTE — PLAN OF CARE
"Problem: General Rehab Plan of Care  Goal: Occupational Therapy Goals  Description: With MIN A, pt will demonstrate illness management skills necessary for positive resumption of home and community roles by discharge.     Pt will engage in 30 minutes of functional activity with 1 verbal cue on 2 consecutive occasions by discharge, in order to prepare for resumption of daily roles    Outcome: Improving     Daily Note:  Pt attended 2 of 3 OT groups today. Pt participated in AM OT clinic IND, where she initiated a chosen project (making her own coloring sheets), followed through with plan, and asked for support with supplies as needed. Selected several songs online to share with the group. Pt maintained attention on task for an extended period of time, but took several breaks to look out the window. Additionally, pt engaged in therapeutic group activity and discussion addressing healthy coping, brainstorming various healthy coping strategies, and identified cleaning and going to the gym as strategies. Educated pt on use of a \"coping skills box\" when unsure how to deal with strong emotions with pt verbalizing understanding. Pt excused herself early from group due to discharge.        "

## 2021-07-21 NOTE — PLAN OF CARE
Pt appeared to sleep 5 hours. Pt c/o indigestion, anxiety, and trouble sleeping and was given PRN maalox and hydroxyzine @0253. Pt later woke up and requested another dose of maalox @0330 because she wanted to have it with a snack. Pt educated on indications for maalox, and the timing of the next available dose, and after multiple explanations pt verbalized understanding.      Problem: Sleep Disturbance  Goal: Adequate Sleep/Rest  Outcome: No Change     Problem: Cognitive Impairment (Psychotic Signs/Symptoms)  Goal: Optimal Cognitive Function (Psychotic Signs/Symptoms)  Outcome: No Change

## 2021-07-21 NOTE — PLAN OF CARE
Assessment/Intervention/Current Symtoms and Care Coordination    Attended team meeting and reviewed chart notes.    Pt will discharge to home today.    Pt will be transported by [facility] [phone].    Writer left NAKUL EL saying pt is discharging to home. Writer attempted to LM with brother Alexander however the phone disconnected.     Discharge Plan or Goal  Discharge to home      Barriers to Discharge   No barriers      Referral Status  none      Legal Status  voluntary

## 2021-07-21 NOTE — DISCHARGE INSTRUCTIONS
"Behavioral Discharge Planning and Instructions    Summary: You were admitted on 7/12/2021 due to Manic Symptomology.  You were treated by Dr. Plascencia and discharged on 7/21/21 from station 22 to Home    Main Diagnosis:   Schizoaffective disorder, bipolar type    Health Care Follow-up:   Date/Time: Wednesday July 28 8:20AM  Psychiatrist: Maddison Valentino PA-C  Address: [address] [place] [address], [address]  [place] [address]  Phone: [phone]  Fax: [phone]      Therapist @ [facility]- Every Wednesday @ 2:00       [facility] : Damion Watson [phone]        Attend all scheduled appointments with your outpatient providers. Call at least 24 hours in advance if you need to reschedule an appointment to ensure continued access to your outpatient providers.     Major Treatments, Procedures and Findings:  You were provided with: a psychiatric assessment, assessed for medical stability, medication evaluation and/or management and milieu management    Symptoms to Report: feeling more aggressive, increased confusion, losing more sleep, mood getting worse or thoughts of suicide    Early warning signs can include: increased depression or anxiety sleep disturbances increased thoughts or behaviors of suicide or self-harm  increased unusual thinking, such as paranoia or hearing voices    Safety and Wellness:  Take all medicines as directed.  Make no changes unless your doctor suggests them.  Follow treatment recommendations.  Refrain from alcohol and non-prescribed drugs.  If there is a concern for safety, call 911.    Resources:   Crisis Intervention: 988.501.4841 or 906-648-3560 (TTY: 331.590.3392).  Call anytime for help.  [employer] [place] [employer]ness (www.[place].vincent.org): 254.757.5708 or 335-406-1446.  Mental Health Association of [place] (www.mentalhealth.org): 551.970.2961 or 278-053-1360  Text 4 Life: txt \"LIFE\" to 73493 for immediate support and crisis " "intervention  Crisis text line: Text \"[place]\" to 506148. Free, confidential, 24/7.  [facility]: 1-993.246.3576     General Medication Instructions:   See your medication sheet(s) for instructions.   Take all medicines as directed.  Make no changes unless your doctor suggests them.   Go to all your doctor visits.  Be sure to have all your required lab tests. This way, your medicines can be refilled on time.  Do not use any drugs not prescribed by your doctor.  Avoid alcohol.    Advance Directives:   Scanned document on file with [employer]? No scanned doc  Is document scanned? Pt states no documents  Honoring Choices Your Rights Handout: Minor - N/A  Was more information offered? Pt declined    The Treatment team has appreciated the opportunity to work with you. If you have any questions or concerns about your recent admission, you can contact the unit which can receive your call 24 hours a day, 7 days a week. They will be able to get in touch with a Provider if needed. The unit number is [phone].      "

## 2021-07-22 ENCOUNTER — VIRTUAL VISIT (OUTPATIENT)
Dept: SURGERY | Facility: CLINIC | Age: 36
End: 2021-07-22
Payer: MEDICARE

## 2021-07-22 ENCOUNTER — PATIENT OUTREACH (OUTPATIENT)
Dept: NURSING | Facility: CLINIC | Age: 36
End: 2021-07-22
Payer: MEDICARE

## 2021-07-22 VITALS — HEIGHT: 64 IN | WEIGHT: 227 LBS | BODY MASS INDEX: 38.76 KG/M2

## 2021-07-22 DIAGNOSIS — Z71.89 OTHER SPECIFIED COUNSELING: ICD-10-CM

## 2021-07-22 DIAGNOSIS — Z71.3 NUTRITIONAL COUNSELING: ICD-10-CM

## 2021-07-22 DIAGNOSIS — K21.9 GASTROESOPHAGEAL REFLUX DISEASE WITHOUT ESOPHAGITIS: ICD-10-CM

## 2021-07-22 DIAGNOSIS — E66.812 OBESITY, CLASS II, BMI 35-39.9, ISOLATED (SEE ACTUAL BMI): Primary | ICD-10-CM

## 2021-07-22 PROCEDURE — 98967 PH1 ASSMT&MGMT NQHP 11-20: CPT | Performed by: DIETITIAN, REGISTERED

## 2021-07-22 ASSESSMENT — MIFFLIN-ST. JEOR: SCORE: 1704.67

## 2021-07-22 ASSESSMENT — ACTIVITIES OF DAILY LIVING (ADL): DEPENDENT_IADLS:: INDEPENDENT

## 2021-07-22 NOTE — PROGRESS NOTES
Clinic Care Coordination Contact    Clinic Care Coordination Contact  OUTREACH    Referral Information:  Referral Source: IP Report    Primary Diagnosis: Behavioral Health    Chief Complaint   Patient presents with     Clinic Care Coordination - Post Hospital     Schizoaffective disorder        [place] Utilization:   Clinic Utilization  No PCP office visit in Past Year: No    Utilization    Hospital Admissions  1             ED Visits  4             No Show Count (past year)  9                Current as of: 7/22/2021  8:23 AM              Clinical Concerns:  Pt was hospitalized at [facility] 7/12/21 to 7/21/21 for SI, insomnia, AYAKA, schizoaffective disorder. Ongoing psychosocial stressors including unemployment, apartment complex issues, issues with father of daughter.     Pt has ACT team.     Discharge plan:   Telephone Visit with KARLA FAULKNER RD  Thursday Jul 22, 2021 4:00 PM    Referral: Medication Therapy Management Referral     Health Care Follow-up:  Date/Time: Wednesday July 28 8:20AM  Psychiatrist: Maddison Valentino PA-C  Address: [address] [place] [address], [address]  [place] [address]  Phone: [phone]  Fax: [phone]    Therapist @ [facility]- Every Wednesday @ 2:00    [facility] : Damion Watson [phone]     CC outreach to pt for hospital follow up. Reviewed discharge plan, sent [employer] message after as well.     Pt declined to set up PCP appt right now, still getting acclimated at home. Will need refill of Antacid at some point in August. Advised to send [employer] message or call if needed. Pt agreed.     CC advised to call [facility] to ensure pt has appt and to set up Allison lab draw due 7/24/21. Pt will call this AM. They manage her psych meds.     Medication Management:  Medication review status: Medications reviewed and no changes reported per patient.           START taking:  lithium ER (LITHOBID)  omeprazole (priLOSEC)  Start  taking on: July 22, 2021    CHANGE how you take:  Melatonin    STOP taking:  sertraline 100 MG tablet (ZOLOFT)    Functional Status:  Dependent ADLs: Independent  Dependent IADLs: Independent  Bed or wheelchair confined: No  Mobility Status: Independent  Fallen 2 or more times in the past year?: No  Any fall with injury in the past year?: No    Living Situation:  Current living arrangement: I live in a private home with family    Lifestyle & Psychosocial Needs:    Social Determinants of Health     Tobacco Use: Low Risk      Smoking Tobacco Use: Never Smoker     Smokeless Tobacco Use: Never Used   Alcohol Use:      Frequency of Alcohol Consumption:      Average Number of Drinks:      Frequency of Binge Drinking:    Financial Resource Strain:      Difficulty of Paying Living Expenses:    Food Insecurity:      Worried About Running Out of Food in the Last Year:      Ran Out of Food in the Last Year:    Transportation Needs:      Lack of Transportation (Medical):      Lack of Transportation (Non-Medical):    Physical Activity:      Days of Exercise per Week:      Minutes of Exercise per Session:    Stress:      Feeling of Stress :    Social Connections:      Frequency of Communication with Friends and Family:      Frequency of Social Gatherings with Friends and Family:      Attends [employer] Services:      Active Member of Clubs or Organizations:      Attends Club or Organization Meetings:      Marital Status:    Intimate Partner Violence:      Fear of Current or Ex-Partner:      Emotionally Abused:      Physically Abused:      Sexually Abused:    Depression: Not at risk     PHQ-2 Score: 1   Housing Stability:      Unable to Pay for Housing in the Last Year:      Number of Places Lived in the Last Year:      Unstable Housing in the Last Year:         Mental health DX: Yes  Mental health DX how managed: Medication, Outpatient Counseling, Psychiatrist, Mental Health Targeted Care Manager, ACT  Mental health management  concern: Yes  Chemical Dependency Status: No Current Concerns  Informal Support system: Family     Care Coordinator has reviewed patient's Social Determinants of Health (SDoH) on this date. Upon review, changes were not made.      Resources and Interventions:  Current Resources:   Community Resources: Financial/Insurance, OP Mental Health, County Programs, County Worker,      Equipment Currently Used at Home: none     Advance Care Plan/Directive  Advanced Care Plans/Directives on file: No    Referrals Placed: None     Patient/Caregiver understanding: Pt reports understanding and denies any additional questions or concerns at this times. SYLVESTER CC engaged in [employer]ID[employer] communication during encounter.       Future Appointments              Today France Kingsley RD [facility]          Plan: No further outreaches will be made at this time unless a new referral is made or a change in the pt's status occurs.     Patient was provided with this writer's contact information and encouraged to call with any questions or concerns. Pt will call Joaquim to confirm appts and lab draw.     SYLVESTER CC will send care coordination introduction letter and 24 hr access plan to patient via [employer]. SYLVESTER FRAUSTO sent AVS summary via [employer] as well.     TEREZA Anthony   Social Work Clinic Care Coordinator   [facility]  [phone]  [email or web address]

## 2021-07-22 NOTE — LETTER
7/22/2021         RE: Maddy Ortiz  [address]  [place] [address]        Dear Colleague,    Thank you for referring your patient, Maddy Ortiz, to the [facility] [place]. Please see a copy of my visit note below.    Maddy Ortiz is a 35 year old who is being evaluated via a billable telephone visit.      What phone number would you like to be contacted at? [phone]  How would you like to obtain your AVS? [facility]  Weight Loss Follow-Up Diet Evaluation  Assessment:  Maddy is presenting today for a follow up weight management nutrition consultation. Pt has had an initial appointment with Dr. Serna  Weight loss medication: topamax.   Pt's weight today is 227lb. This is up about 25lb from her initial weight of 202lb.  BMI: Body mass index is 38.96 kg/m .  Ideal body weight: 54.7 kg (120 lb 9.5 oz)  Adjusted ideal body weight: 74 kg (163 lb 2.5 oz)    Estimated RMR ([place]-St Jeor equation):   1600 kcals     Recommended Protein Intake: 60-80 grams of protein/day  Patient Active Problem List:  Patient Active Problem List   Diagnosis     Animal dander allergy     CARDIOVASCULAR SCREENING; LDL GOAL LESS THAN 160     Psychosis (H)     Paranoid type delusional disorder (H)     Bipolar 1 disorder (H)     Insomnia     Depression with anxiety     Seasonal allergic rhinitis due to pollen     Allergic rhinitis due to mold     Allergic rhinitis due to animal dander     Allergic rhinitis due to dust mite     Encounter for IUD insertion     Schizoaffective disorder, bipolar type (H)     Gastroesophageal reflux disease without esophagitis     Paranoia (psychosis) (H)     Obesity (BMI 35.0-39.9) with comorbidity (H)     Schizoaffective disorder (H)     Progress on goals from last visit: patient was admitted to the hospital and her meds have changed and may need to follow a diet for acid reflux  +trying to eat smaller     Dietary  Recall:  Breakfast: couple bananas and a bagel and juice  Lunch: chicken bbq wrap and watermelon  Dinner: bagel with cream cheese, ice cream and lemonade  Beverages: usually drinking water- when out and about will drink sugary juices  Exercise: doing a lot of walking- walked to the store and hair salon, cleaning the house  Sometimes going for a bike ride  Nutrition Diagnosis:    Overweight/Obesity (NC 3.3) related to overeating and poor lifestyle habits as evidenced by skipping meals, frequent eating out, eating high fat and sugar foods and BMI 38.96  Not ready for diet/lifestyle change (NB 1.3) related to unsupported beliefs/attitudes about food, nutrition and nutrition-related topics as evidenced by patient's subjective statements, denial of need to change, inability to meet goals previously set      Intervention:  1. Food and/or nutrient delivery: educated patient on diet for reflux- encouraged her to eliminate sugary beverages, avoid high fat foods and to increase fruits and veggies  2. Nutrition counseling: goal setting    Monitoring/Evaluation:    Goals:  1. Buy a water bottle-use it when going out and about  2. Learn how to make sushi- discussed bowls- rice, imitation crab, veggies and seasoning     Patient to follow up in 2 month(s) with RD        Phone call duration: 16 min          Again, thank you for allowing me to participate in the care of your patient.        Sincerely,        KARLA FAULKNER RD

## 2021-07-22 NOTE — PROGRESS NOTES
Maddy Ortiz is a 35 year old who is being evaluated via a billable telephone visit.      What phone number would you like to be contacted at? [phone]  How would you like to obtain your AVS? [facility]  Weight Loss Follow-Up Diet Evaluation  Assessment:  Maddy is presenting today for a follow up weight management nutrition consultation. Pt has had an initial appointment with Dr. Serna  Weight loss medication: topamax.   Pt's weight today is 227lb. This is up about 25lb from her initial weight of 202lb.  BMI: Body mass index is 38.96 kg/m .  Ideal body weight: 54.7 kg (120 lb 9.5 oz)  Adjusted ideal body weight: 74 kg (163 lb 2.5 oz)    Estimated RMR ([place]-St Jeor equation):   1600 kcals     Recommended Protein Intake: 60-80 grams of protein/day  Patient Active Problem List:  Patient Active Problem List   Diagnosis     Animal dander allergy     CARDIOVASCULAR SCREENING; LDL GOAL LESS THAN 160     Psychosis (H)     Paranoid type delusional disorder (H)     Bipolar 1 disorder (H)     Insomnia     Depression with anxiety     Seasonal allergic rhinitis due to pollen     Allergic rhinitis due to mold     Allergic rhinitis due to animal dander     Allergic rhinitis due to dust mite     Encounter for IUD insertion     Schizoaffective disorder, bipolar type (H)     Gastroesophageal reflux disease without esophagitis     Paranoia (psychosis) (H)     Obesity (BMI 35.0-39.9) with comorbidity (H)     Schizoaffective disorder (H)     Progress on goals from last visit: patient was admitted to the hospital and her meds have changed and may need to follow a diet for acid reflux  +trying to eat smaller     Dietary Recall:  Breakfast: couple bananas and a bagel and juice  Lunch: chicken bbq wrap and watermelon  Dinner: bagel with cream cheese, ice cream and lemonade  Beverages: usually drinking water- when out and about will drink sugary juices  Exercise: doing a lot of walking- walked to the store and hair salon,  cleaning the house  Sometimes going for a bike ride  Nutrition Diagnosis:    Overweight/Obesity (NC 3.3) related to overeating and poor lifestyle habits as evidenced by skipping meals, frequent eating out, eating high fat and sugar foods and BMI 38.96  Not ready for diet/lifestyle change (NB 1.3) related to unsupported beliefs/attitudes about food, nutrition and nutrition-related topics as evidenced by patient's subjective statements, denial of need to change, inability to meet goals previously set      Intervention:  1. Food and/or nutrient delivery: educated patient on diet for reflux- encouraged her to eliminate sugary beverages, avoid high fat foods and to increase fruits and veggies  2. Nutrition counseling: goal setting    Monitoring/Evaluation:    Goals:  1. Buy a water bottle-use it when going out and about  2. Learn how to make sushi- discussed bowls- rice, imitation crab, veggies and seasoning     Patient to follow up in 2 month(s) with RD        Phone call duration: 16 min

## 2021-07-22 NOTE — LETTER
[facility] CARE COORDINATION  [facility]  [address] [place] [address]    July 22, 2021    Maddy Ortiz  [address] [facility] [address]  [place] [address]      Dear Maddy,    I am a clinic care coordinator who works with PHILL Luo CNP at [facility]. I wanted to thank you for spending the time to talk with me.  Below is a description of clinic care coordination and how I can further assist you.      The clinic care coordination team is made up of a registered nurse,  and community health worker who understand the health care system. The goal of clinic care coordination is to help you manage your health and improve access to the health care system in the most efficient manner. The team can assist you in meeting your health care goals by providing education, coordinating services, strengthening the communication among your providers and supporting you with any resource needs.    Please feel free to contact me at [phone] with any questions or concerns. We are focused on providing you with the highest-quality healthcare experience possible and that all starts with you.     Sincerely,     Yaima Sabillon, [facility]   Social Work Clinic Care Coordinator   [facility]  [phone]  [email or web address]     Enclosed: I have enclosed a copy of a 24 Hour Access Plan. This has helpful phone numbers for you to call when needed. Please keep this in an easy to access place to use as needed.

## 2021-07-23 ENCOUNTER — TELEPHONE (OUTPATIENT)
Dept: FAMILY MEDICINE | Facility: CLINIC | Age: 36
End: 2021-07-23

## 2021-07-23 NOTE — TELEPHONE ENCOUNTER
MTM referral from: Transitions of Care (recent hospital discharge or ED visit)    MTM referral outreach attempt #2 on July 23, 2021 at 1:35 PM      Outcome: Patient not reachable after several attempts, will route to MTM Pharmacist/Provider as an FYI. Thank you for the referral.    Jone Blackwood, MTM coordinator

## 2021-07-23 NOTE — TELEPHONE ENCOUNTER
ED / Discharge Outreach Protocol    Patient Contact    Attempt # 1    Was call answered?  No.  Left message on voicemail with information to call me back.    Elvira Quinn RN

## 2021-07-23 NOTE — TELEPHONE ENCOUNTER
ED/UC/IP follow up phone call: Insomnia, unspecified type, suicide ideation     RN please call to follow up. [facility]    Number of ED visits in past 12 months = 0

## 2021-07-28 ENCOUNTER — TRANSFERRED RECORDS (OUTPATIENT)
Dept: HEALTH INFORMATION MANAGEMENT | Facility: CLINIC | Age: 36
End: 2021-07-28

## 2021-08-08 ENCOUNTER — APPOINTMENT (OUTPATIENT)
Dept: GENERAL RADIOLOGY | Facility: CLINIC | Age: 36
End: 2021-08-08
Attending: EMERGENCY MEDICINE
Payer: MEDICARE

## 2021-08-08 ENCOUNTER — HOSPITAL ENCOUNTER (EMERGENCY)
Facility: CLINIC | Age: 36
Discharge: HOME | End: 2021-08-09
Attending: EMERGENCY MEDICINE | Admitting: EMERGENCY MEDICINE
Payer: MEDICARE

## 2021-08-08 DIAGNOSIS — F41.9 ANXIETY: ICD-10-CM

## 2021-08-08 DIAGNOSIS — J18.9 PNEUMONIA OF LEFT UPPER LOBE DUE TO INFECTIOUS ORGANISM: ICD-10-CM

## 2021-08-08 DIAGNOSIS — R06.02 SOB (SHORTNESS OF BREATH): ICD-10-CM

## 2021-08-08 DIAGNOSIS — R05.9 COUGH: ICD-10-CM

## 2021-08-08 LAB — SARS-COV-2 RNA RESP QL NAA+PROBE: NEGATIVE

## 2021-08-08 PROCEDURE — 250N000013 HC RX MED GY IP 250 OP 250 PS 637: Performed by: EMERGENCY MEDICINE

## 2021-08-08 PROCEDURE — 87635 SARS-COV-2 COVID-19 AMP PRB: CPT | Performed by: EMERGENCY MEDICINE

## 2021-08-08 PROCEDURE — 71046 X-RAY EXAM CHEST 2 VIEWS: CPT

## 2021-08-08 PROCEDURE — 99285 EMERGENCY DEPT VISIT HI MDM: CPT | Performed by: EMERGENCY MEDICINE

## 2021-08-08 PROCEDURE — 99284 EMERGENCY DEPT VISIT MOD MDM: CPT | Mod: 25 | Performed by: EMERGENCY MEDICINE

## 2021-08-08 PROCEDURE — C9803 HOPD COVID-19 SPEC COLLECT: HCPCS | Performed by: EMERGENCY MEDICINE

## 2021-08-08 RX ORDER — HYDROXYZINE HYDROCHLORIDE 25 MG/1
50 TABLET, FILM COATED ORAL ONCE
Status: COMPLETED | OUTPATIENT
Start: 2021-08-08 | End: 2021-08-08

## 2021-08-08 RX ADMIN — HYDROXYZINE HYDROCHLORIDE 50 MG: 25 TABLET, FILM COATED ORAL at 23:29

## 2021-08-08 ASSESSMENT — ENCOUNTER SYMPTOMS
NERVOUS/ANXIOUS: 1
FEVER: 0
ABDOMINAL PAIN: 0
SHORTNESS OF BREATH: 0

## 2021-08-08 ASSESSMENT — MIFFLIN-ST. JEOR: SCORE: 1702.4

## 2021-08-09 VITALS
HEART RATE: 110 BPM | OXYGEN SATURATION: 95 % | SYSTOLIC BLOOD PRESSURE: 149 MMHG | HEIGHT: 63 IN | BODY MASS INDEX: 40.75 KG/M2 | WEIGHT: 230 LBS | DIASTOLIC BLOOD PRESSURE: 108 MMHG | RESPIRATION RATE: 18 BRPM | TEMPERATURE: 98.7 F

## 2021-08-09 RX ORDER — AZITHROMYCIN 250 MG/1
TABLET, FILM COATED ORAL
Qty: 6 TABLET | Refills: 0 | Status: SHIPPED | OUTPATIENT
Start: 2021-08-09 | End: 2021-08-14

## 2021-08-09 NOTE — ED TRIAGE NOTES
Pt reports cough and shortness of breath x2-3 months. Patient reports what brought her into the er tonight is that a lot of people in her social network have been passing away and she is fearful that she is next. She reports feeling short of breath, waking up feeling like she can't breath, feeling on edge.

## 2021-08-09 NOTE — ED PROVIDER NOTES
History     Chief Complaint   Patient presents with     Anxiety     HPI  Maddy Ortiz is a 35 year old female who has past medical history significant for schizoaffective disorder, currently on lithium, presenting to the emergency department with 2 to 3 months worth of cough, and shortness of breath.  Patient tells me that she presents to the ED tonight because many individuals in her social network are dying recently.  1 person delayed care and subsequently passed away.  Another individual has passed away secondary to Covid.  Patient has received Covid immunization.  She has been seen at clinic previously.  I reviewed office visit from August 2, 6 days ago.  Patient had concerns regarding cough, requesting Covid testing, and also had earache at that point.  Patient was reassured at that time.    Patient now states ongoing cough, chest tightness, and feelings of waking up in the middle of the night, gasping for air.  No fever.  Nonproductive cough.  No severe abdominal pains.    Allergies:  Allergies   Allergen Reactions     Dust Mites Shortness Of Breath     Animal Dander      Other reaction(s): *Unknown     Metformin Fatigue     Mold      Other reaction(s): Runny Nose     Trees        Problem List:    Patient Active Problem List    Diagnosis Date Noted     Schizoaffective disorder (H) 07/13/2021     Priority: Medium     Obesity (BMI 35.0-39.9) with comorbidity (H) 01/02/2019     Priority: Medium     Paranoia (psychosis) (H) 08/24/2018     Priority: Medium     Gastroesophageal reflux disease without esophagitis 06/08/2018     Priority: Medium     Schizoaffective disorder, bipolar type (H) 05/07/2018     Priority: Medium     Encounter for IUD insertion 09/15/2017     Priority: Medium     inserted 9/15/17 by Krista Keller MD    LOT: [id number]  Exp: 04/20       Seasonal allergic rhinitis due to pollen 11/04/2016     Priority: Medium     Allergic rhinitis due to mold 11/04/2016     Priority: Medium     Allergic  rhinitis due to animal dander 11/04/2016     Priority: Medium     Allergic rhinitis due to dust mite 11/04/2016     Priority: Medium     Depression with anxiety 01/26/2015     Priority: Medium     Insomnia 07/18/2013     Priority: Medium     Bipolar 1 disorder (H) 12/06/2012     Priority: Medium     Planning on seeing Purvi (psychiatric nurse)       Paranoid type delusional disorder (H) 11/14/2012     Priority: Medium     Psychosis (H) 10/16/2012     Priority: Medium     Animal dander allergy 05/09/2012     Priority: Medium     Dog and Cat       CARDIOVASCULAR SCREENING; LDL GOAL LESS THAN 160 05/09/2012     Priority: Medium        Past Medical History:    Past Medical History:   Diagnosis Date     Bipolar 1 disorder (H) 12/6/2012     Depressive disorder      Paranoid type delusional disorder (H) 11/14/2012       Past Surgical History:    Past Surgical History:   Procedure Laterality Date     TONSILLECTOMY & ADENOIDECTOMY      as a child     TONSILLECTOMY & ADENOIDECTOMY         Family History:    Family History   Adopted: Yes   Problem Relation Age of Onset     Unknown/Adopted Mother      Unknown/Adopted Father      Unknown/Adopted Other      Hypertension Sister        Social History:  Marital Status:  Single [1]  Social History     Tobacco Use     Smoking status: Never Smoker     Smokeless tobacco: Never Used     Tobacco comment: around 2nd hand smoke   Substance Use Topics     Alcohol use: Not Currently     Comment: rare wine ( very rare)     Drug use: No        Medications:    amoxicillin-clavulanate (AUGMENTIN) 875-125 MG tablet  azithromycin (ZITHROMAX Z-ODETTE) 250 MG tablet  albuterol (PROAIR HFA/PROVENTIL HFA/VENTOLIN HFA) 108 (90 Base) MCG/ACT inhaler  benztropine (COGENTIN) 0.5 MG tablet  EPINEPHrine (EPIPEN/ADRENACLICK/OR ANY BX GENERIC EQUIV) 0.3 MG/0.3ML injection 2-pack  hydrOXYzine (ATARAX) 10 MG tablet  lactobacillus rhamnosus, GG, (CULTURELL) capsule  levonorgestrel-ethinyl estradiol (AVIANE) 0.1-20  "MG-MCG tablet  lithium ER (LITHOBID) 300 MG CR tablet  Melatonin 10 MG TABS tablet  omeprazole (PRILOSEC) 20 MG DR capsule  ORDER FOR ALLERGEN IMMUNOTHERAPY  ORDER FOR ALLERGEN IMMUNOTHERAPY  ORDER FOR ALLERGEN IMMUNOTHERAPY  ORDER FOR ALLERGEN IMMUNOTHERAPY  terbinafine (LAMISIL AT) 1 % external cream  VRAYLAR 6 MG CAPS capsule          Review of Systems   Constitutional: Negative for fever.   Respiratory: Negative for shortness of breath.    Cardiovascular: Negative for chest pain.   Gastrointestinal: Negative for abdominal pain.   Psychiatric/Behavioral: The patient is nervous/anxious.    All other systems reviewed and are negative.      Physical Exam   BP: (!) 128/99  Pulse: 105  Temp: 98.7  F (37.1  C)  Resp: 18  Height: 160 cm (5' 3\")  Weight: 104.3 kg (230 lb)  SpO2: 96 %      Physical Exam  BP (!) 149/108   Pulse 110   Temp 98.7  F (37.1  C) (Oral)   Resp 18   Ht 1.6 m (5' 3\")   Wt 104.3 kg (230 lb)   SpO2 95%   BMI 40.74 kg/m    BP (!) 149/108   Pulse 110   Temp 98.7  F (37.1  C) (Oral)   Resp 18   Ht 1.6 m (5' 3\")   Wt 104.3 kg (230 lb)   SpO2 95%   BMI 40.74 kg/m    General: alert and in no acute distress  Head: atraumatic, normocephalic  Abd: nondistended  Lungs:  CTA bilaterally.   Musculoskel/Extremities: normal extremities, no apparent edema,   Skin: no rashes, no diaphoresis and skin color normal  Neuro: Patient awake, alert, oriented, speech is fluent, gait is normal  Psychiatric: anxious          ED Course        Procedures              Critical Care time:  none               Results for orders placed or performed during the hospital encounter of 08/08/21 (from the past 24 hour(s))   Symptomatic COVID-19 Virus (Coronavirus) by PCR Nasopharyngeal    Specimen: Nasopharyngeal; Swab    Narrative    The following orders were created for panel order Symptomatic COVID-19 Virus (Coronavirus) by PCR Nasopharyngeal.  Procedure                               Abnormality         Status             "         ---------                               -----------         ------                     SARS-COV2 (COVID-19) Vir...[[id number]]  Normal              Final result                 Please view results for these tests on the individual orders.   SARS-COV2 (COVID-19) Virus RT-PCR    Specimen: Nasopharyngeal; Swab   Result Value Ref Range    SARS CoV2 PCR Negative Negative    Narrative    Testing was performed using the sommer  SARS-CoV-2 & Influenza A/B Assay on the sommre  Tracie  System.  This test should be ordered for the detection of SARS-COV-2 in individuals who meet SARS-CoV-2 clinical and/or epidemiological criteria. Test performance is unknown in asymptomatic patients.  This test is for in vitro diagnostic use under the FDA EUA for laboratories certified under CLIA to perform moderate and/or high complexity testing. This test has not been FDA cleared or approved.  A negative test does not rule out the presence of PCR inhibitors in the specimen or target RNA in concentration below the limit of detection for the assay. The possibility of a false negative should be considered if the patient's recent exposure or clinical presentation suggests COVID-19.  [facility] are certified under the Clinical Laboratory Improvement Amendments of 1988 (CLIA-88) as qualified to perform moderate and/or high complexity laboratory testing.   Chest XR,  PA & LAT    Narrative    EXAM: XR CHEST 2 VW  LOCATION: [facility] [place]  DATE/TIME: 8/8/2021 11:32 PM    INDICATION: Cough and shortness of breath  COMPARISON: 11/06/2020      Impression    IMPRESSION: Minimal interval increase in opacities involving the lingular segment of the left upper lobe adjacent to the cardiac apex. This may be due to lingular infiltrate related to pneumonia or increasing atelectasis adjacent to a prominent cardiac   fat pad which is unchanged. Normal heart size and pulmonary vascularity. Right lung clear. No pleural  fluid. No overt osseous abnormality.       Medications   hydrOXYzine (ATARAX) tablet 50 mg (50 mg Oral Given 8/8/21 2210)       Assessments & Plan (with Medical Decision Making)  35 year old female presenting to the emergency department with concerns regarding cough and shortness of breath over the past 2 to 3 months.  Patient, however feeling on edge, anxious concerned about other individuals in her social network dying around her.  Therefore patient wants to be checked out and reassured that she will not die.    Patient with normal vitals upon arrival.  She does complain of waking up gasping for air at night.  Feel that patient likely has component of sleep apnea.  Will refer to pulmonology.  Has had this referral previously.  Recommended sleep study.    Chest x-ray, reviewed by myself in addition to radiology interpretation showing concerns of possible left lingular pneumonia.  Covid test negative.  Will treat with Augmentin and azithromycin for potential pneumonia, especially given the prolonged cough.  However, not clear pneumonia as this may be artifact.  Regardless given the ongoing symptoms we will treat with antibiotics, and patient reassured given her significant amounts of anxiety with recent deaths of social network individuals.  Patient reassured, and discharged home.  Continue home medications, and follow-up in clinic as needed.  Sleep study recommended for likely sleep apnea     I have reviewed the nursing notes.    I have reviewed the findings, diagnosis, plan and need for follow up with the patient.       Discharge Medication List as of 8/9/2021 12:27 AM      START taking these medications    Details   amoxicillin-clavulanate (AUGMENTIN) 875-125 MG tablet Take 1 tablet by mouth 2 times daily for 7 days, Disp-14 tablet, R-0, E-Prescribe      azithromycin (ZITHROMAX Z-ODETTE) 250 MG tablet Two tablets on the first day, then one tablet daily for the next 4 days, Disp-6 tablet, R-0, E-Prescribe              Final diagnoses:   Cough   Anxiety   SOB (shortness of breath)   Pneumonia of left upper lobe due to infectious organism       8/8/2021   [facility] EMERGENCY DEPT     Jay Jay Ortiz MD  08/09/21 0036

## 2021-08-09 NOTE — DISCHARGE INSTRUCTIONS
Antibiotics as prescribed.  Follow-up in clinic to ensure resolution of symptoms.    Follow-up with sleep study in the future.

## 2021-08-14 ENCOUNTER — NURSE TRIAGE (OUTPATIENT)
Dept: NURSING | Facility: CLINIC | Age: 36
End: 2021-08-14

## 2021-08-14 ENCOUNTER — HOSPITAL ENCOUNTER (EMERGENCY)
Facility: CLINIC | Age: 36
Discharge: HOME | End: 2021-08-14
Attending: FAMILY MEDICINE | Admitting: FAMILY MEDICINE
Payer: MEDICARE

## 2021-08-14 ENCOUNTER — APPOINTMENT (OUTPATIENT)
Dept: GENERAL RADIOLOGY | Facility: CLINIC | Age: 36
End: 2021-08-14
Attending: FAMILY MEDICINE
Payer: MEDICARE

## 2021-08-14 VITALS
OXYGEN SATURATION: 97 % | DIASTOLIC BLOOD PRESSURE: 104 MMHG | SYSTOLIC BLOOD PRESSURE: 147 MMHG | TEMPERATURE: 97 F | BODY MASS INDEX: 41.63 KG/M2 | WEIGHT: 235 LBS | RESPIRATION RATE: 16 BRPM | HEART RATE: 113 BPM

## 2021-08-14 DIAGNOSIS — K52.1 ANTIBIOTIC-INDUCED COLITIS: ICD-10-CM

## 2021-08-14 DIAGNOSIS — T36.95XA ANTIBIOTIC-INDUCED COLITIS: ICD-10-CM

## 2021-08-14 DIAGNOSIS — G47.10: ICD-10-CM

## 2021-08-14 LAB
ALBUMIN SERPL-MCNC: 3.2 G/DL (ref 3.4–5)
ALP SERPL-CCNC: 88 U/L (ref 40–150)
ALT SERPL W P-5'-P-CCNC: 61 U/L (ref 0–50)
ANION GAP SERPL CALCULATED.3IONS-SCNC: 8 MMOL/L (ref 3–14)
AST SERPL W P-5'-P-CCNC: 45 U/L (ref 0–45)
BASOPHILS # BLD AUTO: 0.1 10E3/UL (ref 0–0.2)
BASOPHILS NFR BLD AUTO: 1 %
BILIRUB SERPL-MCNC: 0.3 MG/DL (ref 0.2–1.3)
BUN SERPL-MCNC: 12 MG/DL (ref 7–30)
CALCIUM SERPL-MCNC: 8.1 MG/DL (ref 8.5–10.1)
CHLORIDE BLD-SCNC: 110 MMOL/L (ref 94–109)
CO2 SERPL-SCNC: 22 MMOL/L (ref 20–32)
CREAT SERPL-MCNC: 0.67 MG/DL (ref 0.52–1.04)
EOSINOPHIL # BLD AUTO: 0.4 10E3/UL (ref 0–0.7)
EOSINOPHIL NFR BLD AUTO: 4 %
ERYTHROCYTE [DISTWIDTH] IN BLOOD BY AUTOMATED COUNT: 12.6 % (ref 10–15)
GFR SERPL CREATININE-BSD FRML MDRD: >90 ML/MIN/1.73M2
GLUCOSE BLD-MCNC: 214 MG/DL (ref 70–99)
HCT VFR BLD AUTO: 35.4 % (ref 35–47)
HGB BLD-MCNC: 12.2 G/DL (ref 11.7–15.7)
IMM GRANULOCYTES # BLD: 0.1 10E3/UL
IMM GRANULOCYTES NFR BLD: 1 %
LITHIUM SERPL-SCNC: 0.5 MMOL/L
LYMPHOCYTES # BLD AUTO: 2.2 10E3/UL (ref 0.8–5.3)
LYMPHOCYTES NFR BLD AUTO: 20 %
MCH RBC QN AUTO: 30.4 PG (ref 26.5–33)
MCHC RBC AUTO-ENTMCNC: 34.5 G/DL (ref 31.5–36.5)
MCV RBC AUTO: 88 FL (ref 78–100)
MONOCYTES # BLD AUTO: 0.7 10E3/UL (ref 0–1.3)
MONOCYTES NFR BLD AUTO: 6 %
NEUTROPHILS # BLD AUTO: 7.8 10E3/UL (ref 1.6–8.3)
NEUTROPHILS NFR BLD AUTO: 68 %
NRBC # BLD AUTO: 0 10E3/UL
NRBC BLD AUTO-RTO: 0 /100
PLATELET # BLD AUTO: 242 10E3/UL (ref 150–450)
POTASSIUM BLD-SCNC: 3.7 MMOL/L (ref 3.4–5.3)
PROT SERPL-MCNC: 6.8 G/DL (ref 6.8–8.8)
RBC # BLD AUTO: 4.01 10E6/UL (ref 3.8–5.2)
SODIUM SERPL-SCNC: 140 MMOL/L (ref 133–144)
WBC # BLD AUTO: 11.3 10E3/UL (ref 4–11)

## 2021-08-14 PROCEDURE — 80178 ASSAY OF LITHIUM: CPT | Performed by: FAMILY MEDICINE

## 2021-08-14 PROCEDURE — 74019 RADEX ABDOMEN 2 VIEWS: CPT

## 2021-08-14 PROCEDURE — 85025 COMPLETE CBC W/AUTO DIFF WBC: CPT | Performed by: FAMILY MEDICINE

## 2021-08-14 PROCEDURE — 99284 EMERGENCY DEPT VISIT MOD MDM: CPT | Performed by: FAMILY MEDICINE

## 2021-08-14 PROCEDURE — 80053 COMPREHEN METABOLIC PANEL: CPT | Performed by: FAMILY MEDICINE

## 2021-08-14 PROCEDURE — 36415 COLL VENOUS BLD VENIPUNCTURE: CPT | Performed by: FAMILY MEDICINE

## 2021-08-14 ASSESSMENT — ENCOUNTER SYMPTOMS
DIARRHEA: 1
WEAKNESS: 0
PALPITATIONS: 0
RHINORRHEA: 0
CHEST TIGHTNESS: 0
COUGH: 0
DIAPHORESIS: 0
CHILLS: 0
FEVER: 0
NAUSEA: 0
HEADACHES: 0
DIFFICULTY URINATING: 0
LIGHT-HEADEDNESS: 0
ABDOMINAL PAIN: 0
SLEEP DISTURBANCE: 1
ABDOMINAL DISTENTION: 1
VOMITING: 0
JOINT SWELLING: 0
SORE THROAT: 0
FATIGUE: 1
CONSTIPATION: 0
SHORTNESS OF BREATH: 0
SPEECH DIFFICULTY: 0

## 2021-08-14 NOTE — DISCHARGE INSTRUCTIONS
Discussed her sleeping problem with the psychiatric medication manager at your appointment on Monday.  Try a probiotic for your colon symptoms.  Follow-up in the clinic this week with primary care.  Return here if symptoms worsen.

## 2021-08-14 NOTE — TELEPHONE ENCOUNTER
"Patient calling with abdominal swelling and insomnia. Reports having a history of insomnia but is reporting she is having a hard time sleeping and will fall asleep standing or walking. Patient started taking Lithium about a month ago and has noticed symptoms worsening since starting this medication. Advised per protocol to be seen in 3 days but if interfering with her normal day to be seen in urgent care today. Patient agreeable to be seen in urgent care today.     Lexie Tolentino RN 08/14/21 9:31 AM   [facility] Triage Nurse Advisor    Reason for Disposition    [1] Insomnia persists > 1 week AND [2] no improvement after using CARE ADVICE    Additional Information    Negative: Difficulty breathing    Negative: Depression is suspected    Negative: Traumatic Brain Injury (TBI) is suspected    Negative: Alcohol  abuse or dependence is suspected    Negative: Substance abuse or dependence suspected    Negative: [1] Pain is causing insomnia AND [2] pain is not a chronic symptom (recurrent or ongoing AND present > 4 weeks)    Negative: Requesting medication for sleep (\"sleeping pill\")    Protocols used: INSOMNIA-A-AH    COVID 19 Nurse Triage Plan/Patient Instructions    Please be aware that novel coronavirus (COVID-19) may be circulating in the community. If you develop symptoms such as fever, cough, or SOB or if you have concerns about the presence of another infection including coronavirus (COVID-19), please contact your health care provider or visit [email or web address].     Disposition/Instructions    In-Person Visit with provider recommended. Reference Visit Selection Guide.    Thank you for taking steps to prevent the spread of this virus.  o Limit your contact with others.  o Wear a simple mask to cover your cough.  o Wash your hands well and often.    Resources    [facility]: About COVID-19: [email or web address]    CDC: What to Do If You're Sick: " www.cdc.gov/coronavirus/2019-ncov/about/steps-when-sick.html    CDC: Ending Home Isolation: www.cdc.gov/coronavirus/2019-ncov/hcp/disposition-in-home-patients.html     CDC: Caring for Someone: www.cdc.gov/coronavirus/2019-ncov/if-you-are-sick/care-for-someone.html     [facility]: Interim Guidance for Hospital Discharge to Home: [email or web address]/[email or web address]/hcp/hospdischarge.pdf    [facility] clinical trials (COVID-19 research studies): [email or web address]-[email or web address]     Below are the COVID-19 hotlines at the [place] [facility] ([facility]). Interpreters are available.   o For health questions: Call 700-137-6615 or 1-439.521.4287 (7 a.m. to 7 p.m.)  o For questions about schools and childcare: Call 148-579-8697 or 1-221.838.1729 (7 a.m. to 7 p.m.)

## 2021-08-14 NOTE — ED TRIAGE NOTES
Pt  has had insomnia for several weeks, states has been taking sleep aide at times but feels even when she does sleep well she is very tired t/o the day and falls asleep randomly. Pt  also has abd bloating x 1 year, feels she is having a side effect from lithium.

## 2021-08-14 NOTE — ED PROVIDER NOTES
History     Chief Complaint   Patient presents with     Insomnia     Bloated     HPI  Maddy Ortiz is a 35 year old female who presents with a couple concerns.    1.  She has insomnia associated with hypersomnia during the daytime.  This has been an issue for her and she was recently seen here in the department for similar complaints and is actually scheduled for sleep apnea testing and sleep consultation.  Of note is that she was hospitalized at the [facility] last month for episode of sofía, she had lithium therapy instituted and a lot of her fatigue was felt to be a post manic fatigue.    2.  She has some abdominal bloating and diarrhea.  Of note is that when she was seen in the department about a week or 2 ago she was diagnosed with possible pneumonia and started on 2 antibiotics is quite possible she could have some antibiotic associated diarrhea.    She states she is taking her medications regularly but I will check a lithium level today.    She is scheduled for follow-up with her psychiatric medication manager on Monday which is in 2 days.    She reports that her cough symptoms have largely abated since taking the course of antibiotics which she was prescribed last time she was here.    Allergies:  Allergies   Allergen Reactions     Dust Mites Shortness Of Breath     Animal Dander      Other reaction(s): *Unknown     Metformin Fatigue     Mold      Other reaction(s): Runny Nose     Trees        Problem List:    Patient Active Problem List    Diagnosis Date Noted     Schizoaffective disorder (H) 07/13/2021     Priority: Medium     Obesity (BMI 35.0-39.9) with comorbidity (H) 01/02/2019     Priority: Medium     Paranoia (psychosis) (H) 08/24/2018     Priority: Medium     Gastroesophageal reflux disease without esophagitis 06/08/2018     Priority: Medium     Schizoaffective disorder, bipolar type (H) 05/07/2018     Priority: Medium     Encounter for IUD insertion 09/15/2017     Priority: Medium     inserted  9/15/17 by Krista Keller MD    LOT: [id number]  Exp: 04/20       Seasonal allergic rhinitis due to pollen 11/04/2016     Priority: Medium     Allergic rhinitis due to mold 11/04/2016     Priority: Medium     Allergic rhinitis due to animal dander 11/04/2016     Priority: Medium     Allergic rhinitis due to dust mite 11/04/2016     Priority: Medium     Depression with anxiety 01/26/2015     Priority: Medium     Insomnia 07/18/2013     Priority: Medium     Bipolar 1 disorder (H) 12/06/2012     Priority: Medium     Planning on seeing Purvi (psychiatric nurse)       Paranoid type delusional disorder (H) 11/14/2012     Priority: Medium     Psychosis (H) 10/16/2012     Priority: Medium     Animal dander allergy 05/09/2012     Priority: Medium     Dog and Cat       CARDIOVASCULAR SCREENING; LDL GOAL LESS THAN 160 05/09/2012     Priority: Medium        Past Medical History:    Past Medical History:   Diagnosis Date     Bipolar 1 disorder (H) 12/6/2012     Depressive disorder      Paranoid type delusional disorder (H) 11/14/2012       Past Surgical History:    Past Surgical History:   Procedure Laterality Date     TONSILLECTOMY & ADENOIDECTOMY      as a child     TONSILLECTOMY & ADENOIDECTOMY         Family History:    Family History   Adopted: Yes   Problem Relation Age of Onset     Unknown/Adopted Mother      Unknown/Adopted Father      Unknown/Adopted Other      Hypertension Sister        Social History:  Marital Status:  Single [1]  Social History     Tobacco Use     Smoking status: Never Smoker     Smokeless tobacco: Never Used     Tobacco comment: around 2nd hand smoke   Substance Use Topics     Alcohol use: Not Currently     Comment: rare wine ( very rare)     Drug use: No        Medications:    albuterol (PROAIR HFA/PROVENTIL HFA/VENTOLIN HFA) 108 (90 Base) MCG/ACT inhaler  amoxicillin-clavulanate (AUGMENTIN) 875-125 MG tablet  azithromycin (ZITHROMAX Z-ODETTE) 250 MG tablet  benztropine (COGENTIN) 0.5 MG  tablet  EPINEPHrine (EPIPEN/ADRENACLICK/OR ANY BX GENERIC EQUIV) 0.3 MG/0.3ML injection 2-pack  hydrOXYzine (ATARAX) 10 MG tablet  lactobacillus rhamnosus, GG, (CULTURELL) capsule  levonorgestrel-ethinyl estradiol (AVIANE) 0.1-20 MG-MCG tablet  lithium ER (LITHOBID) 300 MG CR tablet  Melatonin 10 MG TABS tablet  omeprazole (PRILOSEC) 20 MG DR capsule  ORDER FOR ALLERGEN IMMUNOTHERAPY  ORDER FOR ALLERGEN IMMUNOTHERAPY  ORDER FOR ALLERGEN IMMUNOTHERAPY  ORDER FOR ALLERGEN IMMUNOTHERAPY  terbinafine (LAMISIL AT) 1 % external cream  VRAYLAR 6 MG CAPS capsule          Review of Systems   Constitutional: Positive for fatigue. Negative for chills, diaphoresis and fever.   HENT: Negative for congestion, ear pain, rhinorrhea and sore throat.    Respiratory: Negative for cough, chest tightness and shortness of breath.    Cardiovascular: Negative for chest pain and palpitations.   Gastrointestinal: Positive for abdominal distention and diarrhea. Negative for abdominal pain, constipation, nausea and vomiting.   Genitourinary: Negative for difficulty urinating.   Musculoskeletal: Negative for joint swelling.   Skin: Negative for rash.   Neurological: Negative for syncope, speech difficulty, weakness, light-headedness and headaches.   Psychiatric/Behavioral: Positive for sleep disturbance.       Physical Exam   BP: 138/86  Pulse: (!) 125  Temp: 97  F (36.1  C)  Resp: 18  Weight: 106.6 kg (235 lb)  SpO2: 97 %      Physical Exam  Vitals reviewed.   Constitutional:       General: She is not in acute distress.     Appearance: Normal appearance. She is obese. She is not ill-appearing, toxic-appearing or diaphoretic.   HENT:      Head: Normocephalic and atraumatic.      Right Ear: Tympanic membrane and ear canal normal.      Left Ear: Tympanic membrane and ear canal normal.      Nose: Nose normal.      Mouth/Throat:      Mouth: Mucous membranes are moist.      Pharynx: Oropharynx is clear. No oropharyngeal exudate or posterior  oropharyngeal erythema.   Eyes:      Extraocular Movements: Extraocular movements intact.   Cardiovascular:      Rate and Rhythm: Normal rate and regular rhythm.      Heart sounds: No murmur heard.     Pulmonary:      Effort: Pulmonary effort is normal.      Breath sounds: Normal breath sounds. No wheezing or rales.   Abdominal:      General: Bowel sounds are normal. There is distension.      Tenderness: There is no abdominal tenderness.   Musculoskeletal:         General: Normal range of motion.      Cervical back: Normal range of motion and neck supple.      Right lower leg: No edema.      Left lower leg: No edema.   Lymphadenopathy:      Cervical: No cervical adenopathy.   Skin:     General: Skin is warm and dry.      Findings: No rash.   Neurological:      General: No focal deficit present.   Psychiatric:      Comments: Patient gets somewhat somnolent at times through the interview.         ED Course        Procedures              Critical Care time:  none               Results for orders placed or performed during the hospital encounter of 08/14/21 (from the past 24 hour(s))   Lithium level   Result Value Ref Range    Lithium 0.5   mmol/L   Comprehensive metabolic panel   Result Value Ref Range    Sodium 140 133 - 144 mmol/L    Potassium 3.7 3.4 - 5.3 mmol/L    Chloride 110 (H) 94 - 109 mmol/L    Carbon Dioxide (CO2) 22 20 - 32 mmol/L    Anion Gap 8 3 - 14 mmol/L    Urea Nitrogen 12 7 - 30 mg/dL    Creatinine 0.67 0.52 - 1.04 mg/dL    Calcium 8.1 (L) 8.5 - 10.1 mg/dL    Glucose 214 (H) 70 - 99 mg/dL    Alkaline Phosphatase 88 40 - 150 U/L    AST 45 0 - 45 U/L    ALT 61 (H) 0 - 50 U/L    Protein Total 6.8 6.8 - 8.8 g/dL    Albumin 3.2 (L) 3.4 - 5.0 g/dL    Bilirubin Total 0.3 0.2 - 1.3 mg/dL    GFR Estimate >90 >60 mL/min/1.73m2   CBC with platelets differential    Narrative    The following orders were created for panel order CBC with platelets differential.  Procedure                               Abnormality          Status                     ---------                               -----------         ------                     CBC with platelets and d...[[id number]]  Abnormal            Final result                 Please view results for these tests on the individual orders.   CBC with platelets and differential   Result Value Ref Range    WBC Count 11.3 (H) 4.0 - 11.0 10e3/uL    RBC Count 4.01 3.80 - 5.20 10e6/uL    Hemoglobin 12.2 11.7 - 15.7 g/dL    Hematocrit 35.4 35.0 - 47.0 %    MCV 88 78 - 100 fL    MCH 30.4 26.5 - 33.0 pg    MCHC 34.5 31.5 - 36.5 g/dL    RDW 12.6 10.0 - 15.0 %    Platelet Count 242 150 - 450 10e3/uL    % Neutrophils 68 %    % Lymphocytes 20 %    % Monocytes 6 %    % Eosinophils 4 %    % Basophils 1 %    % Immature Granulocytes 1 %    NRBCs per 100 WBC 0 <1 /100    Absolute Neutrophils 7.8 1.6 - 8.3 10e3/uL    Absolute Lymphocytes 2.2 0.8 - 5.3 10e3/uL    Absolute Monocytes 0.7 0.0 - 1.3 10e3/uL    Absolute Eosinophils 0.4 0.0 - 0.7 10e3/uL    Absolute Basophils 0.1 0.0 - 0.2 10e3/uL    Absolute Immature Granulocytes 0.1 (H) <=0.0 10e3/uL    Absolute NRBCs 0.0 10e3/uL   XR Abdomen 2 Views    Narrative    XR ABDOMEN TWO VIEWS   8/14/2021 1:18 PM     HISTORY: Abdominal pain.    COMPARISON: CT abdomen pelvis on 7/20/2016      Impression    IMPRESSION: Upright and supine views of the abdomen and pelvis were  obtained. Nonobstructive bowel gas pattern. No free peritoneal or  portal venous gas. Few pelvic phleboliths.    JUANITA ANDRADE MD         SYSTEM ID:  [id number]       Medications - No data to display    Assessments & Plan (with Medical Decision Making)     I have reviewed the nursing notes.    I have reviewed the findings, diagnosis, plan and need for follow up with the patient.      35-year-old female who has sleep disorder which is quite possibly due to her currently treated bipolar disorder.  There is been no significant abnormalities on her lab or x-ray.    Her colon bloating is probably  due to antibiotic associated: Superinfection with the inappropriate bacteria and I think she would benefit from a probiotic for this.    She has follow-up scheduled with her psychiatric medication manager on Monday.  I like her to see primary care this week as well.    New Prescriptions    No medications on file       Final diagnoses:   Hypersomnolence disorder, subacute   Antibiotic-induced colitis       8/14/2021   [facility] EMERGENCY DEPT     Trevon Arce MD  08/14/21 [id number]

## 2021-08-18 ENCOUNTER — TRANSFERRED RECORDS (OUTPATIENT)
Dept: HEALTH INFORMATION MANAGEMENT | Facility: CLINIC | Age: 36
End: 2021-08-18

## 2021-08-30 ENCOUNTER — TRANSFERRED RECORDS (OUTPATIENT)
Dept: HEALTH INFORMATION MANAGEMENT | Facility: CLINIC | Age: 36
End: 2021-08-30

## 2021-09-05 ENCOUNTER — HEALTH MAINTENANCE LETTER (OUTPATIENT)
Age: 36
End: 2021-09-05

## 2021-09-20 ENCOUNTER — NURSE TRIAGE (OUTPATIENT)
Dept: NURSING | Facility: CLINIC | Age: 36
End: 2021-09-20

## 2021-09-20 ENCOUNTER — HOSPITAL ENCOUNTER (EMERGENCY)
Facility: CLINIC | Age: 36
Discharge: HOME | End: 2021-09-20
Attending: FAMILY MEDICINE | Admitting: FAMILY MEDICINE
Payer: MEDICARE

## 2021-09-20 VITALS
DIASTOLIC BLOOD PRESSURE: 82 MMHG | SYSTOLIC BLOOD PRESSURE: 130 MMHG | HEART RATE: 101 BPM | BODY MASS INDEX: 40.97 KG/M2 | WEIGHT: 240 LBS | OXYGEN SATURATION: 98 % | TEMPERATURE: 97.6 F | HEIGHT: 64 IN | RESPIRATION RATE: 22 BRPM

## 2021-09-20 DIAGNOSIS — R73.9 HYPERGLYCEMIA: ICD-10-CM

## 2021-09-20 DIAGNOSIS — M79.89 SWELLING OF LIMB: ICD-10-CM

## 2021-09-20 DIAGNOSIS — R14.0 BLOATING SYMPTOM: ICD-10-CM

## 2021-09-20 DIAGNOSIS — R20.2 PARESTHESIAS: ICD-10-CM

## 2021-09-20 LAB
ALBUMIN SERPL-MCNC: 3.4 G/DL (ref 3.4–5)
ALP SERPL-CCNC: 74 U/L (ref 40–150)
ALT SERPL W P-5'-P-CCNC: 55 U/L (ref 0–50)
ANION GAP SERPL CALCULATED.3IONS-SCNC: 7 MMOL/L (ref 3–14)
AST SERPL W P-5'-P-CCNC: 40 U/L (ref 0–45)
BASOPHILS # BLD AUTO: 0.1 10E3/UL (ref 0–0.2)
BASOPHILS NFR BLD AUTO: 1 %
BILIRUB SERPL-MCNC: 0.2 MG/DL (ref 0.2–1.3)
BUN SERPL-MCNC: 16 MG/DL (ref 7–30)
CALCIUM SERPL-MCNC: 8 MG/DL (ref 8.5–10.1)
CHLORIDE BLD-SCNC: 111 MMOL/L (ref 94–109)
CO2 SERPL-SCNC: 21 MMOL/L (ref 20–32)
CREAT SERPL-MCNC: 0.71 MG/DL (ref 0.52–1.04)
EOSINOPHIL # BLD AUTO: 0.3 10E3/UL (ref 0–0.7)
EOSINOPHIL NFR BLD AUTO: 3 %
ERYTHROCYTE [DISTWIDTH] IN BLOOD BY AUTOMATED COUNT: 12.7 % (ref 10–15)
GFR SERPL CREATININE-BSD FRML MDRD: >90 ML/MIN/1.73M2
GLUCOSE BLD-MCNC: 209 MG/DL (ref 70–99)
HCT VFR BLD AUTO: 36.4 % (ref 35–47)
HGB BLD-MCNC: 12.3 G/DL (ref 11.7–15.7)
IMM GRANULOCYTES # BLD: 0.1 10E3/UL
IMM GRANULOCYTES NFR BLD: 1 %
LITHIUM SERPL-SCNC: 0.4 MMOL/L
LYMPHOCYTES # BLD AUTO: 2.5 10E3/UL (ref 0.8–5.3)
LYMPHOCYTES NFR BLD AUTO: 23 %
MAGNESIUM SERPL-MCNC: 2.2 MG/DL (ref 1.6–2.3)
MCH RBC QN AUTO: 30.4 PG (ref 26.5–33)
MCHC RBC AUTO-ENTMCNC: 33.8 G/DL (ref 31.5–36.5)
MCV RBC AUTO: 90 FL (ref 78–100)
MONOCYTES # BLD AUTO: 1 10E3/UL (ref 0–1.3)
MONOCYTES NFR BLD AUTO: 9 %
NEUTROPHILS # BLD AUTO: 7.2 10E3/UL (ref 1.6–8.3)
NEUTROPHILS NFR BLD AUTO: 63 %
NRBC # BLD AUTO: 0 10E3/UL
NRBC BLD AUTO-RTO: 0 /100
NT-PROBNP SERPL-MCNC: 34 PG/ML (ref 0–450)
PLATELET # BLD AUTO: 264 10E3/UL (ref 150–450)
POTASSIUM BLD-SCNC: 3.8 MMOL/L (ref 3.4–5.3)
PROT SERPL-MCNC: 6.9 G/DL (ref 6.8–8.8)
RBC # BLD AUTO: 4.04 10E6/UL (ref 3.8–5.2)
SODIUM SERPL-SCNC: 139 MMOL/L (ref 133–144)
WBC # BLD AUTO: 11.3 10E3/UL (ref 4–11)

## 2021-09-20 PROCEDURE — 99284 EMERGENCY DEPT VISIT MOD MDM: CPT | Performed by: FAMILY MEDICINE

## 2021-09-20 PROCEDURE — 36415 COLL VENOUS BLD VENIPUNCTURE: CPT | Performed by: FAMILY MEDICINE

## 2021-09-20 PROCEDURE — 80178 ASSAY OF LITHIUM: CPT | Performed by: FAMILY MEDICINE

## 2021-09-20 PROCEDURE — 83880 ASSAY OF NATRIURETIC PEPTIDE: CPT | Performed by: FAMILY MEDICINE

## 2021-09-20 PROCEDURE — 99283 EMERGENCY DEPT VISIT LOW MDM: CPT | Performed by: FAMILY MEDICINE

## 2021-09-20 PROCEDURE — 82040 ASSAY OF SERUM ALBUMIN: CPT | Performed by: FAMILY MEDICINE

## 2021-09-20 PROCEDURE — 85025 COMPLETE CBC W/AUTO DIFF WBC: CPT | Performed by: FAMILY MEDICINE

## 2021-09-20 PROCEDURE — 83735 ASSAY OF MAGNESIUM: CPT | Performed by: FAMILY MEDICINE

## 2021-09-20 RX ORDER — LEVOTHYROXINE SODIUM 25 UG/1
1 TABLET ORAL
Status: ON HOLD | COMMUNITY
Start: 2021-09-13 | End: 2022-01-11

## 2021-09-20 ASSESSMENT — MIFFLIN-ST. JEOR: SCORE: 1755.69

## 2021-09-20 NOTE — ED PROVIDER NOTES
"  History     Chief Complaint   Patient presents with     Allergic Reaction     HPI  Maddy Ortiz is a 35 year old female, past medical history is significant for schizoaffective disorder, obesity, paranoia, GERD, seasonal allergic rhinitis, depression, insomnia, bipolar one disorder, paranoid type delusional disorder, psychosis, animal dander allergy, resents to the emergency department with multisystem concerns over the preceding 2 months by the patient's account.  The patient identifies primarily concerns regarding bloating to her abdomen, swelling of her hands and feet as well as occasional numbness in her hands and feet, mild shortness of air, more easily fatigable and short of breath on exertion but no associated chest pain.  She states that she has been having these issues beginning about 2 months ago when she was started on lithium.  The most recent lithium level check was 8/14/2021 that I reviewed and her EHR and was subtherapeutic at 0.5.  She also states that she has many \"psychological issues\" that she is dealing with in therapy with psychiatry.   She suspects but has not documented a significant weight gain in the 2-month time period described.        Allergies:  Allergies   Allergen Reactions     Dust Mites Shortness Of Breath     Animal Dander      Other reaction(s): *Unknown     Metformin Fatigue     Mold      Other reaction(s): Runny Nose     Trees        Problem List:    Patient Active Problem List    Diagnosis Date Noted     Schizoaffective disorder (H) 07/13/2021     Priority: Medium     Obesity (BMI 35.0-39.9) with comorbidity (H) 01/02/2019     Priority: Medium     Paranoia (psychosis) (H) 08/24/2018     Priority: Medium     Gastroesophageal reflux disease without esophagitis 06/08/2018     Priority: Medium     Schizoaffective disorder, bipolar type (H) 05/07/2018     Priority: Medium     Encounter for IUD insertion 09/15/2017     Priority: Medium     inserted 9/15/17 by Krista Keller " MD    LOT: [id number]  Exp: 04/20       Seasonal allergic rhinitis due to pollen 11/04/2016     Priority: Medium     Allergic rhinitis due to mold 11/04/2016     Priority: Medium     Allergic rhinitis due to animal dander 11/04/2016     Priority: Medium     Allergic rhinitis due to dust mite 11/04/2016     Priority: Medium     Depression with anxiety 01/26/2015     Priority: Medium     Insomnia 07/18/2013     Priority: Medium     Bipolar 1 disorder (H) 12/06/2012     Priority: Medium     Planning on seeing Purvi (psychiatric nurse)       Paranoid type delusional disorder (H) 11/14/2012     Priority: Medium     Psychosis (H) 10/16/2012     Priority: Medium     Animal dander allergy 05/09/2012     Priority: Medium     Dog and Cat       CARDIOVASCULAR SCREENING; LDL GOAL LESS THAN 160 05/09/2012     Priority: Medium        Past Medical History:    Past Medical History:   Diagnosis Date     Bipolar 1 disorder (H) 12/6/2012     Depressive disorder      Paranoid type delusional disorder (H) 11/14/2012       Past Surgical History:    Past Surgical History:   Procedure Laterality Date     TONSILLECTOMY & ADENOIDECTOMY      as a child     TONSILLECTOMY & ADENOIDECTOMY         Family History:    Family History   Adopted: Yes   Problem Relation Age of Onset     Unknown/Adopted Mother      Unknown/Adopted Father      Unknown/Adopted Other      Hypertension Sister        Social History:  Marital Status:  Single [1]  Social History     Tobacco Use     Smoking status: Never Smoker     Smokeless tobacco: Never Used     Tobacco comment: around 2nd hand smoke   Substance Use Topics     Alcohol use: Not Currently     Comment: rare wine ( very rare)     Drug use: No        Medications:    albuterol (PROAIR HFA/PROVENTIL HFA/VENTOLIN HFA) 108 (90 Base) MCG/ACT inhaler  benztropine (COGENTIN) 0.5 MG tablet  EPINEPHrine (EPIPEN/ADRENACLICK/OR ANY BX GENERIC EQUIV) 0.3 MG/0.3ML injection 2-pack  hydrOXYzine (ATARAX) 10 MG  "tablet  lactobacillus rhamnosus, GG, (CULTURELL) capsule  levonorgestrel-ethinyl estradiol (AVIANE) 0.1-20 MG-MCG tablet  levothyroxine (SYNTHROID/LEVOTHROID) 25 MCG tablet  lithium ER (LITHOBID) 300 MG CR tablet  Melatonin 10 MG TABS tablet  ORDER FOR ALLERGEN IMMUNOTHERAPY  ORDER FOR ALLERGEN IMMUNOTHERAPY  ORDER FOR ALLERGEN IMMUNOTHERAPY  ORDER FOR ALLERGEN IMMUNOTHERAPY  terbinafine (LAMISIL AT) 1 % external cream  VRAYLAR 6 MG CAPS capsule          Review of Systems   All other systems reviewed and are negative.      Physical Exam   BP: 134/84  Pulse: 101  Temp: 97.6  F (36.4  C)  Resp: 22  Height: 161.3 cm (5' 3.5\")  Weight: 108.9 kg (240 lb)  SpO2: 98 %      Physical Exam  Vitals and nursing note reviewed.   Constitutional:       Appearance: Normal appearance. She is normal weight.   HENT:      Head: Normocephalic and atraumatic.      Right Ear: Tympanic membrane normal.      Left Ear: Tympanic membrane normal.      Nose: Nose normal.      Mouth/Throat:      Mouth: Mucous membranes are dry.      Pharynx: Oropharynx is clear.   Eyes:      Extraocular Movements: Extraocular movements intact.      Conjunctiva/sclera: Conjunctivae normal.      Pupils: Pupils are equal, round, and reactive to light.   Cardiovascular:      Rate and Rhythm: Normal rate and regular rhythm.      Pulses: Normal pulses.      Heart sounds: Normal heart sounds.   Pulmonary:      Effort: Pulmonary effort is normal.      Breath sounds: Normal breath sounds.   Abdominal:      General: Bowel sounds are normal.      Palpations: Abdomen is soft.   Musculoskeletal:         General: Normal range of motion.      Cervical back: Normal range of motion and neck supple.   Skin:     General: Skin is warm and dry.      Capillary Refill: Capillary refill takes less than 2 seconds.   Neurological:      General: No focal deficit present.      Mental Status: She is alert and oriented to person, place, and time.   Psychiatric:         Mood and Affect: Mood " normal.         Behavior: Behavior normal.         ED Course        Procedures              Critical Care time:  none               Results for orders placed or performed during the hospital encounter of 09/20/21 (from the past 24 hour(s))   CBC with platelets, differential    Narrative    The following orders were created for panel order CBC with platelets, differential.  Procedure                               Abnormality         Status                     ---------                               -----------         ------                     CBC with platelets and d...[[id number]]  Abnormal            Final result                 Please view results for these tests on the individual orders.   Comprehensive metabolic panel   Result Value Ref Range    Sodium 139 133 - 144 mmol/L    Potassium 3.8 3.4 - 5.3 mmol/L    Chloride 111 (H) 94 - 109 mmol/L    Carbon Dioxide (CO2) 21 20 - 32 mmol/L    Anion Gap 7 3 - 14 mmol/L    Urea Nitrogen 16 7 - 30 mg/dL    Creatinine 0.71 0.52 - 1.04 mg/dL    Calcium 8.0 (L) 8.5 - 10.1 mg/dL    Glucose 209 (H) 70 - 99 mg/dL    Alkaline Phosphatase 74 40 - 150 U/L    AST 40 0 - 45 U/L    ALT 55 (H) 0 - 50 U/L    Protein Total 6.9 6.8 - 8.8 g/dL    Albumin 3.4 3.4 - 5.0 g/dL    Bilirubin Total 0.2 0.2 - 1.3 mg/dL    GFR Estimate >90 >60 mL/min/1.73m2   Lithium level   Result Value Ref Range    Lithium 0.4   mmol/L   NT pro BNP   Result Value Ref Range    N terminal Pro BNP Inpatient 34 0 - 450 pg/mL   Magnesium   Result Value Ref Range    Magnesium 2.2 1.6 - 2.3 mg/dL   CBC with platelets and differential   Result Value Ref Range    WBC Count 11.3 (H) 4.0 - 11.0 10e3/uL    RBC Count 4.04 3.80 - 5.20 10e6/uL    Hemoglobin 12.3 11.7 - 15.7 g/dL    Hematocrit 36.4 35.0 - 47.0 %    MCV 90 78 - 100 fL    MCH 30.4 26.5 - 33.0 pg    MCHC 33.8 31.5 - 36.5 g/dL    RDW 12.7 10.0 - 15.0 %    Platelet Count 264 150 - 450 10e3/uL    % Neutrophils 63 %    % Lymphocytes 23 %    % Monocytes 9 %    %  Eosinophils 3 %    % Basophils 1 %    % Immature Granulocytes 1 %    NRBCs per 100 WBC 0 <1 /100    Absolute Neutrophils 7.2 1.6 - 8.3 10e3/uL    Absolute Lymphocytes 2.5 0.8 - 5.3 10e3/uL    Absolute Monocytes 1.0 0.0 - 1.3 10e3/uL    Absolute Eosinophils 0.3 0.0 - 0.7 10e3/uL    Absolute Basophils 0.1 0.0 - 0.2 10e3/uL    Absolute Immature Granulocytes 0.1 (H) <=0.0 10e3/uL    Absolute NRBCs 0.0 10e3/uL       Medications - No data to display    Assessments & Plan (with Medical Decision Making)   35-year-old female past medical history reviewed as above who presents with multisystem concerns to the emergency department approximately 2 months duration as described in the HPI.  Symptoms correlate with the initiation of lithium therapy according to the patient.  She has been previously subtherapeutic, subtherapeutic again today.  No notable exam findings.  Lab diagnostics are also notable for hyperglycemia with a glucose of 209 nonfasting.  This was discussed with the patient and I recommended follow-up with a fasting blood glucose coordinated through her primary care provider.  She has an appointment with her psychiatrist this coming Monday 1 week from today.  I discussed discontinuing the lithium and considering alternative medication with her psychiatrist which she agrees to do.  We discussed differential diagnostic considerations for the perceived edema and paresthesias.  Follow-up is as planned.      Disclaimer: This note consists of symbols derived from keyboarding, dictation and/or voice recognition software. As a result, there may be errors in the script that have gone undetected. Please consider this when interpreting information found in this chart.      I have reviewed the nursing notes.    I have reviewed the findings, diagnosis, plan and need for follow up with the patient.          New Prescriptions    No medications on file       Final diagnoses:   Bloating symptom   Swelling of limb   Paresthesias    Hyperglycemia       9/20/2021   [facility] EMERGENCY DEPT     Blaine Tinsley MD  09/20/21 4418

## 2021-09-20 NOTE — ED TRIAGE NOTES
"Pt was using pet cleaning products and has a lot of pet dander. Pt also has a sleep study on the 5th pt called nurse line on Saturday regarding bloating, swelling, bilateral numbness in hands and legs and SOA. Pt does not make direct eye contact at any time and does appear anxious as well, states \"having psychological issues as well, but mostly bloating.\"  "

## 2021-09-20 NOTE — DISCHARGE INSTRUCTIONS
As we discussed as your symptoms seem to correlate with the use of lithium consideration could be given to discontinuing this and alternate medication considered.  Your blood sugar was elevated today and I would recommend confirming with a fasting glucose test the possibility of type 2 diabetes.  This can be ordered by your primary care provider.  Return to the emergency department if worse or changes.

## 2021-09-20 NOTE — TELEPHONE ENCOUNTER
Medication reaction and asthma inhaler.  Having hard time breathing. Uncomfortable. Narcolepsy. Tired.  Going to urgent care. Near her home and since she is having severe reaction, and alone, she is going there.    Speaking in full sentences.  Clearing throat. Has been going on for hours.  No benadryl at home.    Kathryn BERRY [facility] Nurse Advisor        Reason for Disposition    [1] MODERATE difficulty breathing (e.g., speaks in phrases, SOB even at rest, pulse 100-120) AND [2] NEW-onset or WORSE than normal    Additional Information    Negative: [1] Breathing stopped AND [2] hasn't returned    Negative: Choking on something    Negative: Severe difficulty breathing (e.g., struggling for each breath, speaks in single words)    Negative: Bluish (or gray) lips or face now    Negative: Difficult to awaken or acting confused (e.g., disoriented, slurred speech)    Negative: Passed out (i.e., lost consciousness, collapsed and was not responding)    Negative: Wheezing started suddenly after medicine, an allergic food or bee sting    Negative: Stridor    Negative: Slow, shallow and weak breathing    Negative: Sounds like a life-threatening emergency to the triager    Negative: Chest pain    Negative: [1] Wheezing (high pitched whistling sound) AND [2] previous asthma attacks or use of asthma medicines    Negative: [1] Difficulty breathing AND [2] only present when coughing    Negative: [1] Difficulty breathing AND [2] only from stuffy or runny nose    Negative: [1] Difficulty breathing AND [2] within 14 days of COVID-19 Exposure    Protocols used: BREATHING DIFFICULTY-A-AH

## 2021-09-23 ENCOUNTER — NURSE TRIAGE (OUTPATIENT)
Dept: NURSING | Facility: CLINIC | Age: 36
End: 2021-09-23

## 2021-09-23 ENCOUNTER — VIRTUAL VISIT (OUTPATIENT)
Dept: SURGERY | Facility: CLINIC | Age: 36
End: 2021-09-23
Payer: MEDICARE

## 2021-09-23 VITALS — HEIGHT: 64 IN | WEIGHT: 240 LBS | BODY MASS INDEX: 40.97 KG/M2

## 2021-09-23 DIAGNOSIS — E66.01 OBESITY, CLASS III, BMI 40-49.9 (MORBID OBESITY) (H): Primary | ICD-10-CM

## 2021-09-23 DIAGNOSIS — Z71.3 NUTRITIONAL COUNSELING: ICD-10-CM

## 2021-09-23 PROCEDURE — 98968 PH1 ASSMT&MGMT NQHP 21-30: CPT | Performed by: DIETITIAN, REGISTERED

## 2021-09-23 ASSESSMENT — MIFFLIN-ST. JEOR: SCORE: 1755.69

## 2021-09-23 NOTE — LETTER
9/23/2021         RE: Maddy Ortiz  [address]  [place] [address]        Dear Colleague,    Thank you for referring your patient, Maddy Ortiz, to the [facility] [place]. Please see a copy of my visit note below.    Maddy Otriz is a 35 year old who is being evaluated via a billable telephone visit.      What phone number would you like to be contacted at? [phone]  How would you like to obtain your AVS? [facility]  Weight Loss Follow-Up Diet Evaluation  Assessment:  Maddy is presenting today for a follow up weight management nutrition consultation. Pt has had an initial appointment with Dr. Serna  Weight loss medication: topamax- not taking  +feels like she doesn't want to take any medication  Pt's weight is 240 lbs 0 oz      Changes and Difficulties 10/15/2018   I have made the following changes to my diet since my last visit: soup no pizza   With regards to my diet, I am still struggling with: none   I have made the following changes to my activity/exercise since my last visit: walking   With regards to my activity/exercise, I am still struggling with: tiredness     BMI: Body mass index is 41.85 kg/m .  Ideal body weight: 53.5 kg (118 lb 0.9 oz)  Adjusted ideal body weight: 75.7 kg (166 lb 13.3 oz)    Recommended Protein Intake: 60-80 grams of protein/day  Patient Active Problem List:  Patient Active Problem List   Diagnosis     Animal dander allergy     CARDIOVASCULAR SCREENING; LDL GOAL LESS THAN 160     Psychosis (H)     Paranoid type delusional disorder (H)     Bipolar 1 disorder (H)     Insomnia     Depression with anxiety     Seasonal allergic rhinitis due to pollen     Allergic rhinitis due to mold     Allergic rhinitis due to animal dander     Allergic rhinitis due to dust mite     Encounter for IUD insertion     Schizoaffective disorder, bipolar type (H)     Gastroesophageal reflux disease without esophagitis      Paranoia (psychosis) (H)     Obesity (BMI 35.0-39.9) with comorbidity (H)     Schizoaffective disorder (H)     Progress on goals from last visit: Reports being a bit tired, napping frequently. She is a bit frantic on the phone today, stating that she plans to stop taking all medications as she feels they are having an impact on her weight, blood glucose, energy level, etc.  I advised her to call her prescribing physician to discuss this before abruptly stopping her meds. She has an appointment this Monday and there is a recorded phone call to her primary care today regarding this.     Is unsure of recent weight- reports bloating and pain.     Dietary Recall:  Breakfast: 2 bowls of raisin bran  Lunch:sushi- mauricio, cucumber and rice-states fish tasted too strong  Dinner: garden salad and pablito dressing and some chips  Typical snacks: broccoli  Beverages: bought an insulated water bottle and ice cube trays  Has been drinking some sugary beverages in order to keep herself awake  Exercise: walking to the store, cleaning   +taking a lot of naps  Nutrition Diagnosis:    Overweight/Obesity (NC 3.3) related to overeating and poor lifestyle habits as evidenced by skipping meals, frequent eating out, eating high fat and sugar foods and BMI 41.85    Not ready for diet/lifestyle change (NB 1.3) related to unsupported beliefs/attitudes about food, nutrition and nutrition-related topics as evidenced by patient's subjective statements, denial of need to change, inability to meet goals previously set        Intervention:  1. Food and/or nutrient delivery: encouraged patient to have consistency in her diet- work on balance with protein, veggies and carbs  2. Coordination of nutrition care: encouraged her to continue following up with he physicians regarding stopping her medications    Monitoring/Evaluation:    Goals:  1. Eat more vegetables  2. Problem solve energy    Patient to follow up in 2 month(s) with bariatrician and 3-4  month(s) with RD        Phone call duration: 23 minutes    KARLA FAULKNER RD        Again, thank you for allowing me to participate in the care of your patient.        Sincerely,        KARLA FAULKNER RD

## 2021-09-23 NOTE — PROGRESS NOTES
Maddy Ortiz is a 35 year old who is being evaluated via a billable telephone visit.      What phone number would you like to be contacted at? [phone]  How would you like to obtain your AVS? [facility]  Weight Loss Follow-Up Diet Evaluation  Assessment:  Maddy is presenting today for a follow up weight management nutrition consultation. Pt has had an initial appointment with Dr. Serna  Weight loss medication: topamax- not taking  +feels like she doesn't want to take any medication  Pt's weight is 240 lbs 0 oz      Changes and Difficulties 10/15/2018   I have made the following changes to my diet since my last visit: soup no pizza   With regards to my diet, I am still struggling with: none   I have made the following changes to my activity/exercise since my last visit: walking   With regards to my activity/exercise, I am still struggling with: tiredness     BMI: Body mass index is 41.85 kg/m .  Ideal body weight: 53.5 kg (118 lb 0.9 oz)  Adjusted ideal body weight: 75.7 kg (166 lb 13.3 oz)    Recommended Protein Intake: 60-80 grams of protein/day  Patient Active Problem List:  Patient Active Problem List   Diagnosis     Animal dander allergy     CARDIOVASCULAR SCREENING; LDL GOAL LESS THAN 160     Psychosis (H)     Paranoid type delusional disorder (H)     Bipolar 1 disorder (H)     Insomnia     Depression with anxiety     Seasonal allergic rhinitis due to pollen     Allergic rhinitis due to mold     Allergic rhinitis due to animal dander     Allergic rhinitis due to dust mite     Encounter for IUD insertion     Schizoaffective disorder, bipolar type (H)     Gastroesophageal reflux disease without esophagitis     Paranoia (psychosis) (H)     Obesity (BMI 35.0-39.9) with comorbidity (H)     Schizoaffective disorder (H)     Progress on goals from last visit: Reports being a bit tired, napping frequently. She is a bit frantic on the phone today, stating that she plans to stop taking all medications as  she feels they are having an impact on her weight, blood glucose, energy level, etc.  I advised her to call her prescribing physician to discuss this before abruptly stopping her meds. She has an appointment this Monday and there is a recorded phone call to her primary care today regarding this.     Is unsure of recent weight- reports bloating and pain.     Dietary Recall:  Breakfast: 2 bowls of raisin bran  Lunch:sushi- mauricio, cucumber and rice-states fish tasted too strong  Dinner: garden salad and pablito dressing and some chips  Typical snacks: broccoli  Beverages: bought an insulated water bottle and ice cube trays  Has been drinking some sugary beverages in order to keep herself awake  Exercise: walking to the store, cleaning   +taking a lot of naps  Nutrition Diagnosis:    Overweight/Obesity (NC 3.3) related to overeating and poor lifestyle habits as evidenced by skipping meals, frequent eating out, eating high fat and sugar foods and BMI 41.85    Not ready for diet/lifestyle change (NB 1.3) related to unsupported beliefs/attitudes about food, nutrition and nutrition-related topics as evidenced by patient's subjective statements, denial of need to change, inability to meet goals previously set        Intervention:  1. Food and/or nutrient delivery: encouraged patient to have consistency in her diet- work on balance with protein, veggies and carbs  2. Coordination of nutrition care: encouraged her to continue following up with he physicians regarding stopping her medications    Monitoring/Evaluation:    Goals:  1. Eat more vegetables  2. Problem solve energy    Patient to follow up in 2 month(s) with bariatrician and 3-4 month(s) with RD        Phone call duration: 23 minutes    KARLA FAULKNER RD

## 2021-09-23 NOTE — TELEPHONE ENCOUNTER
Patient is calling because she doesn't want to take her psych meds anymore, doesn't want to take her dose tonight. Reports lithium makes her feel sick. Lithium is prescribed by [facility]. RN did advise that she should speak with provider before stopping pysch meds abruptly. Patient states she will try to call [facility] clinic and see if there is an on call provider. Patient states she has an upcoming appointment with [facility] on 9/27.    Temi Dunaway RN/[facility] Nurse Advisors        Reason for Disposition    [1] Caller has NON-URGENT medication question about med that PCP prescribed AND [2] triager unable to answer question    Additional Information    Negative: Drug overdose and triager unable to answer question    Negative: Caller requesting information unrelated to medicine    Negative: Caller requesting a prescription for Strep throat and has a positive culture result    Negative: Rash while taking a medication or within 3 days of stopping it    Negative: Immunization reaction suspected    Negative: [1] Asthma and [2] having symptoms of asthma (cough, wheezing, etc.)    Negative: [1] Influenza symptoms AND [2] anti-viral med prescription request, such as Tamiflu    Negative: [1] Symptom of illness (e.g., headache, abdominal pain, earache, vomiting) AND [2] more than mild    Negative: MORE THAN A DOUBLE DOSE of a prescription or over-the-counter (OTC) drug    Negative: [1] DOUBLE DOSE (an extra dose or lesser amount) of over-the-counter (OTC) drug AND [2] any symptoms (e.g., dizziness, nausea, pain, sleepiness)    Negative: [1] DOUBLE DOSE (an extra dose or lesser amount) of prescription drug AND [2] any symptoms (e.g., dizziness, nausea, pain, sleepiness)    Negative: Took another person's prescription drug    Negative: [1] DOUBLE DOSE (an extra dose or lesser amount) of prescription drug AND [2] NO symptoms (Exception: a double dose of antibiotics)    Negative: Diabetes drug error or overdose  "(e.g., took wrong type of insulin or took extra dose)    Negative: [1] Request for URGENT new prescription or refill of \"essential\" medication (i.e., likelihood of harm to patient if not taken) AND [2] triager unable to fill per unit policy    Negative: [1] Prescription not at pharmacy AND [2] was prescribed by PCP recently    Negative: [1] Pharmacy calling with prescription questions AND [2] triager unable to answer question    Negative: [1] Caller has URGENT medication question about med that PCP or specialist prescribed AND [2] triager unable to answer question    Protocols used: MEDICATION QUESTION CALL-A-AH      "

## 2021-09-24 ENCOUNTER — NURSE TRIAGE (OUTPATIENT)
Dept: NURSING | Facility: CLINIC | Age: 36
End: 2021-09-24

## 2021-09-24 NOTE — TELEPHONE ENCOUNTER
Pt is calling.    Feeling upset and unhappy as she is feeling nauseated.  Does not want to take her medication. She thinks that it may make it worse.  Nauseated but is feeling hungry and wants to eat something.  Pre-Diabetic.  Denies vomiting. But has been gagging.  Cleaning her floors so the smell is getting to her of the . She thinks that this may be causing her nausea as well.  Care advice reviewed. When to call back reviewed per care advice.  I advised her to try to eat some toast, crackers. Small, bland diet starting out. That may help the nausea. Nausea can be made worse on an empty stomach.  Very important to take your medications.  She verbalized understanding and will try that. If that does not help, she stated that she will call us back.          COVID 19 Nurse Triage Plan/Patient Instructions    Please be aware that novel coronavirus (COVID-19) may be circulating in the community. If you develop symptoms such as fever, cough, or SOB or if you have concerns about the presence of another infection including coronavirus (COVID-19), please contact your health care provider or visit [email or web address].     Disposition/Instructions    Home care recommended. Follow home care protocol based instructions.    Thank you for taking steps to prevent the spread of this virus.  o Limit your contact with others.  o Wear a simple mask to cover your cough.  o Wash your hands well and often.    Resources    [facility]: About COVID-19: [email or web address]    CDC: What to Do If You're Sick: www.cdc.gov/coronavirus/2019-ncov/about/steps-when-sick.html    CDC: Ending Home Isolation: www.cdc.gov/coronavirus/2019-ncov/hcp/disposition-in-home-patients.html     CDC: Caring for Someone: www.cdc.gov/coronavirus/2019-ncov/if-you-are-sick/care-for-someone.html     [facility]: Interim Guidance for Hospital Discharge to Home: www.health.[facility].[place].us/diseases/coronavirus/hcp/hospdischarge.pdf    [facility]  [place] clinical trials (COVID-19 research studies): [email or web address].[facility]-clinical-trials     Below are the COVID-19 hotlines at the [facility] of [facility] ([facility]). Interpreters are available.   o For health questions: Call 779-265-4139 or 1-267.471.9998 (7 a.m. to 7 p.m.)  o For questions about schools and childcare: Call 572-929-8624 or 1-829.428.3038 (7 a.m. to 7 p.m.)   o   Reason for Disposition    Unexplained nausea    Additional Information    Negative: Shock suspected (e.g., cold/pale/clammy skin, too weak to stand, low BP, rapid pulse)    Negative: Sounds like a life-threatening emergency to the triager    Negative: [1] Nausea or vomiting AND [2] pregnancy < 20 weeks    Negative: Menstrual Period - Missed or Late (i.e., pregnancy suspected)    Negative: Heat exhaustion suspected (i.e., dehydration from heat exposure)    Negative: Motion sickness suspected (i.e., nausea with car, plane, boat, or train travel)    Negative: Anxiety or stress suspected (i.e., nausea with anxiety attacks or stressful situations)    Negative: Traumatic Brain Injury (TBI) suspected    Negative: Nausea (or Vomiting) in a cancer patient who is currently (or recently) receiving chemotherapy or radiation therapy, or cancer patient who has metastatic or end-stage cancer and is receiving palliative care    Negative: Vomiting occurs    Negative: Other symptom is present, see that guideline.  (e.g., chest pain, headache, dizziness, abdominal pain, colds, sore throat, etc.).    Negative: Unable to walk, or can only walk with assistance (e.g., requires support)    Negative: Difficulty breathing    Negative: [1] Insulin-dependent diabetes (Type I) AND [2] glucose > 400 mg/dl (22 mmol/l)    Negative: [1] Drinking very little AND [2] dehydration suspected (e.g., no urine > 12 hours, very dry mouth, very lightheaded)    Negative: Patient sounds very sick or weak to the triager    Negative: Fever > 104 F (40 C)    Negative: [1]  Fever > 101 F (38.3 C) AND [2] age > 60    Negative: [1] Fever > 100.0 F (37.8 C) AND [2] bedridden (e.g., nursing home patient, CVA, chronic illness, recovering from surgery)    Negative: [1] Fever > 100.0 F (37.8 C) AND [2] diabetes mellitus or weak immune system (e.g., HIV positive, cancer chemo, splenectomy, organ transplant, chronic steroids)    Negative: Taking any of the following medications: digoxin (Lanoxin), lithium, theophylline, phenytoin (Dilantin)    Negative: Yellowish color of the skin or white of the eye (i.e., jaundice)    Negative: Fever present > 3 days (72 hours)    Negative: Receiving cancer chemotherapy medication    Negative: Taking prescription medication that could cause nausea (e.g., narcotics/opiates, antibiotics, OCPs, many others)    Negative: Nausea lasts > 1 week    Negative: Alcohol or drug abuse, known or suspected    Negative: Nausea is a chronic symptom (recurrent or ongoing AND present > 4 weeks)    Protocols used: NAUSEA-A-    Radha Agee RN  [facility] Nurse Advisor  9/24/2021 at 5:02 AM

## 2021-09-27 ENCOUNTER — TRANSFERRED RECORDS (OUTPATIENT)
Dept: HEALTH INFORMATION MANAGEMENT | Facility: CLINIC | Age: 36
End: 2021-09-27

## 2021-09-27 ENCOUNTER — NURSE TRIAGE (OUTPATIENT)
Dept: NURSING | Facility: CLINIC | Age: 36
End: 2021-09-27

## 2021-09-27 NOTE — TELEPHONE ENCOUNTER
Patient is getting frustrated, she doesn't want to take her medications anymore. Patient states she can't breathe unless standing up. She wakes up every hour, thinks she has sleep apnea because of her neck, because she has so much fat on her neck. When she lays down, she can't breathe. Patient wants to  know if she goes to the doctor- can she get surgery to get rid of the fat on her neck?  States she has an upcoming sleep study on 10/5  Feels like someone is choking her. Feels short of breath even at rest.   States she has gained a lot of weight from her meds. Patient doesn't want to take the medications anymore, but her doctor keeps telling her to continue the medication. Patient wants to make an appointment with PCP.     RN did advise per RN triage guidelines for patient to visit the ER tonight due to breathing difficulty. Advised to get taxi or have someone (family or friend) drive her. Patient stated okay, she will go into the ER. Patient also wanted to schedule an appointment for this week. Transferred to scheduling.       Temi Dunaway, RN/[facility] Nurse Advisors          COVID 19 Nurse Triage Plan/Patient Instructions    Please be aware that novel coronavirus (COVID-19) may be circulating in the community. If you develop symptoms such as fever, cough, or SOB or if you have concerns about the presence of another infection including coronavirus (COVID-19), please contact your health care provider or visit [email or web address].     Disposition/Instructions    ED Visit recommended. Follow protocol based instructions.     Bring Your Own Device:  Please also bring your smart device(s) (smart phones, tablets, laptops) and their charging cables for your personal use and to communicate with your care team during your visit.    Thank you for taking steps to prevent the spread of this virus.  o Limit your contact with others.  o Wear a simple mask to cover your cough.  o Wash your hands well and  often.    Resources    [facility]: About COVID-19: [email or web address]    CDC: What to Do If You're Sick: www.cdc.gov/coronavirus/2019-ncov/about/steps-when-sick.html    CDC: Ending Home Isolation: www.cdc.gov/coronavirus/2019-ncov/hcp/disposition-in-home-patients.html     CDC: Caring for Someone: www.cdc.gov/coronavirus/2019-ncov/if-you-are-sick/care-for-someone.html     [facility]: Interim Guidance for Hospital Discharge to Home: www.health.[facility].mn./diseases/coronavirus/hcp/hospdischarge.pdf    [facility] clinical trials (COVID-19 research studies): [email or web address].[email or web address]-clinical-trials     Below are the COVID-19 hotlines at the [place] Department of Health ([facility]). Interpreters are available.   o For health questions: Call 579-377-5944 or 1-209.631.9271 (7 a.m. to 7 p.m.)  o For questions about schools and childcare: Call 469-180-1540 or 1-116.806.4560 (7 a.m. to 7 p.m.)     Reason for Disposition    [1] MODERATE difficulty breathing (e.g., speaks in phrases, SOB even at rest, pulse 100-120) AND [2] NEW-onset or WORSE than normal    Additional Information    Negative: Bluish (or gray) lips or face now    Negative: [1] Breathing stopped AND [2] hasn't returned    Negative: Choking on something    Negative: Severe difficulty breathing (e.g., struggling for each breath, speaks in single words)    Negative: Difficult to awaken or acting confused (e.g., disoriented, slurred speech)    Negative: Passed out (i.e., lost consciousness, collapsed and was not responding)    Negative: Wheezing started suddenly after medicine, an allergic food or bee sting    Negative: Stridor    Negative: Slow, shallow and weak breathing    Negative: Sounds like a life-threatening emergency to the triager    Protocols used: BREATHING DIFFICULTY-A-AH

## 2021-09-28 ENCOUNTER — TELEPHONE (OUTPATIENT)
Dept: ALLERGY | Facility: CLINIC | Age: 36
End: 2021-09-28

## 2021-09-28 ENCOUNTER — TELEPHONE (OUTPATIENT)
Dept: FAMILY MEDICINE | Facility: CLINIC | Age: 36
End: 2021-09-28

## 2021-09-28 NOTE — TELEPHONE ENCOUNTER
Elvira MACIAS CMA that is working with Dr Kapoor asked me to call this patient as Dr Kapoor has requested that she be seen in the ER and not on his scheduled this afternoon. I left a message for the patient to return our call.    Ioana Patel RN

## 2021-09-28 NOTE — TELEPHONE ENCOUNTER
Call placed to patient, discussed OV notes from 3/19/2021  Appointment scheduled on Oct. 21  Simona SEPULVEDA RN  Specialty/Allergy Clinic    3/19/2021 visit, follow-up 9/19/2021  Today we discussed 2 options:  Option #1. Restart allergy shots within the next month and continue them for 3 years. We will do our best to the possibility of infection.  Option #2. She received allergy injections for about 2 years. Her symptoms improved. It is hard to say how long she will have a sustained effect. We could wait and see while treating her symptoms with medications.     Maddy chose option #2.  I will see her back in 6 months.  At that time, if she continues doing well, nothing new needs to be done.  If her symptoms get worse, then we can discuss restarting allergen immunotherapy again.  Meanwhile, she can continue using azelastine nasal spray.  She can increase it up to 2 sprays in each nostril twice daily if needed to address nasal congestion.  If it does not help, she can add intranasal fluticasone 1-2 sprays in each nostril once daily.

## 2021-09-28 NOTE — TELEPHONE ENCOUNTER
Reason for Call:  Other call back    Detailed comments: Pt would like care team to reach out to her to discuss starting allergy shots because of new pet in household. Pt experiencing allergy symptoms. Please advise.    Phone Number Patient can be reached at: Home number on file [phone] (home)    Best Time: any    Can we leave a detailed message on this number? YES    Call taken on 9/28/2021 at 7:36 AM by Marlon Lebron

## 2021-09-28 NOTE — TELEPHONE ENCOUNTER
Patient returns our call and she is instructed to make her way to the ER to be evaluated. Nicci PONCE RN

## 2021-10-04 ENCOUNTER — TELEPHONE (OUTPATIENT)
Dept: FAMILY MEDICINE | Facility: CLINIC | Age: 36
End: 2021-10-04
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
## 2021-10-04 NOTE — TELEPHONE ENCOUNTER
herve called back and states they are not allowed to make contact with pt without more cause. They have stricter guidelines. Officer is familiar with pt and states sofía is her baseline so is not surprised. Called pt back to adv could not get ahold of therapist and ? If she called her father. She states she fell asleep due to her prn and will call him now. States she will probably go to the ed to check in for her sofía but will definitely call 911 with any suicidal thoughts.     Richard Russell RN

## 2021-10-08 NOTE — PROGRESS NOTES
Outpatient Physical Therapy Discharge Note     Patient: Maddy Ortiz  : 1985    Beginning/End Dates of Reporting Period:  21 to 21    Referring Provider: Shirley Mcclure Diagnosis: muscle tightness and radiating right arm pain      Client Self Report: felt good after PT for awhile, not sure how long it felt better for. Rating pain at 3/10. Not having as much pain in her mid back or down the arm. Most of the discomfort is in her neck.     Objective Measurements:  Objective Measure: pain   Details: neck region and low back, none down the arm.       Goals:  Goal Identifier 1   Goal Description Patient will report no numbness into the right hand.    Target Date 21   Date Met      Progress (detail required for progress note):       Goal Identifier 2   Goal Description Patient will be independent with her HEP for strengthening outside of PT.    Target Date 21   Date Met      Progress (detail required for progress note):       Goal Identifier 3   Goal Description Patient will be able to complete household chores with pain staying below a 3/10 in the neck/right arm.    Target Date 21   Date Met      Progress (detail required for progress note):       Goal Identifier 4   Goal Description Patient will be able to lay on her side with no right shoulder/arm pain.    Target Date 21   Date Met      Progress (detail required for progress note):         Progress towards Goals:   Progress this reporting period: All information listed above was at patient's last attended PT visit. At her last attended session, patient's Symptoms were improving with just 2 rounds of traction.. However, pt has failed to schedule f/u visits within 30 days from last visit thus is being d/c from therapy at this time. Objective measures are all taken from last visit. Current status is unknown at this time.    Plan:  Discharge from therapy.    Discharge:    Reason for Discharge: Patient has failed to  schedule further appointments.    Equipment Issued: none    Discharge Plan:  Not completed since patient failed to schedule further appointments.    Lenka Galan  PT, DPT       10/8/2021   [address] [address]  [email or web address]  Voicemail: [phone]

## 2021-10-10 ENCOUNTER — NURSE TRIAGE (OUTPATIENT)
Dept: NURSING | Facility: CLINIC | Age: 36
End: 2021-10-10

## 2021-10-11 NOTE — TELEPHONE ENCOUNTER
"Vaccinated with 2 doses of Pfizer in Summer 2021.  \"I am having a difficult time: racing thoughts, uninhibited. I have calmed down a bit since I took my medications. Can you communicate to my medication manager?. I have messaged my Dr via My Chart. I have tried to get some contact with someone who is a friend but is not responding to my messages. My daughter has a curfew of 10pm. I am having problems with person not enforcing curfew in [place]. I am having trouble being more effective. I think I may go to [facility]--new , new therapist, new medication manager, [employer] worker. It is hard for me to get support. I am trying to find a way to problem solve issues with my daughter\"..  Caller is not currently having difficulty caring for herself.   Triaged to a disposition of See physician within 24 hrs.   Advised to discuss with her therapist. She will call her in the morning. She is going to bed now.     Flor Nguyen RN Triage Nurse Advisor 11:21 PM 10/10/2021       Reason for Disposition    [1] Schizophrenia AND [2] worsening (e.g., increasing voices, thinking less clearly, more agitated, less able to do activities of daily living)    Additional Information    Negative: Patient attempted suicide    Negative: Patient is threatening suicide now    Negative: Hearing voices that are telling patient to commit suicide now    Negative: Violent behavior, or threatening to physically hurt or kill someone    Negative: Hearing voices that are telling patient to kill a specific person    Negative: [1] Patient is very confused (disoriented, slurred speech) AND [2] no other adult (e.g., friend or family member) available    Negative: [1] Difficult to awaken or acting very confused (disoriented, slurred speech) AND [2] new onset    Negative: Drug overdose suspected    Negative: Sounds like a life-threatening emergency to the triager    Negative: Alcohol use, abuse or dependence, question or problem related to    Negative: " "Drug abuse or dependence, question or problem related to    Negative: [1] Schizophrenia AND [2] unable to do any of normal activities (e.g., self care, school, work; in comparison to baseline).    Negative: Head is twisting to one side (or ask \"is it turning against your will?\")    Negative: Patient sounds very sick or weak to the triager    Protocols used: SCHIZOPHRENIA-A-      "

## 2021-10-21 ENCOUNTER — OFFICE VISIT (OUTPATIENT)
Dept: ALLERGY | Facility: CLINIC | Age: 36
End: 2021-10-21
Payer: MEDICARE

## 2021-10-21 VITALS
TEMPERATURE: 99 F | DIASTOLIC BLOOD PRESSURE: 98 MMHG | WEIGHT: 254.41 LBS | HEART RATE: 114 BPM | BODY MASS INDEX: 44.36 KG/M2 | OXYGEN SATURATION: 95 % | SYSTOLIC BLOOD PRESSURE: 131 MMHG

## 2021-10-21 DIAGNOSIS — J30.89 ALLERGIC RHINITIS DUE TO MOLD: ICD-10-CM

## 2021-10-21 DIAGNOSIS — J30.89 ALLERGIC RHINITIS DUE TO DUST MITE: ICD-10-CM

## 2021-10-21 DIAGNOSIS — J30.1 CHRONIC SEASONAL ALLERGIC RHINITIS DUE TO POLLEN: Primary | ICD-10-CM

## 2021-10-21 DIAGNOSIS — J30.81 ALLERGIC RHINITIS DUE TO ANIMAL DANDER: ICD-10-CM

## 2021-10-21 DIAGNOSIS — R06.09 DYSPNEA ON EXERTION: ICD-10-CM

## 2021-10-21 DIAGNOSIS — R40.0 DROWSINESS: ICD-10-CM

## 2021-10-21 PROCEDURE — 99214 OFFICE O/P EST MOD 30 MIN: CPT | Performed by: ALLERGY & IMMUNOLOGY

## 2021-10-21 RX ORDER — LITHIUM CARBONATE 300 MG/1
TABLET, FILM COATED, EXTENDED RELEASE ORAL
COMMUNITY
Start: 2021-09-27 | End: 2021-10-26

## 2021-10-21 ASSESSMENT — ENCOUNTER SYMPTOMS
HEADACHES: 0
FATIGUE: 1
SINUS PRESSURE: 0
EYE REDNESS: 0
UNEXPECTED WEIGHT CHANGE: 1
EYE ITCHING: 1
EYE DISCHARGE: 1
CHEST TIGHTNESS: 0
RHINORRHEA: 0
ADENOPATHY: 0
COUGH: 0
WHEEZING: 0
NAUSEA: 0
SHORTNESS OF BREATH: 0
FACIAL SWELLING: 0
VOMITING: 0
DIARRHEA: 0

## 2021-10-21 NOTE — LETTER
10/21/2021         RE: Maddy Ortiz  [address]  [facility] [address]        Dear Colleague,    Thank you for referring your patient, Maddy Ortiz, to the [facility]. Please see a copy of my visit note below.    SUBJECTIVE:                                                                   Maddy Ortiz presents today to our Allergy Clinic at [facility] for a follow up visit.  She is a 35 year old female with allergic rhinitis.  Percutaneous skin puncture testing for aeroallergens performed in November 2016 showed sensitivity to dog, cat, dust mite, molds, pollen of trees, grass, and weeds. In spring-summer 2018, she had a buildup using cluster schedule for allergen immunotherapy.  She reached maintenance dose in July 2018.  One episode of anaphylaxis on September 8, 2020, due to allergen immunotherapy, Red 1:1, 0.5mL  Was given epi x 2.  She tolerated a lower dose x 1 after that.        She states that she takes Atarax on as-needed basis for rhinitis symptoms and for anxiety.  These days, she reports increased anxiety due to her niece who just recently started dating a new person, and she is worried about the relationship.  She feels that hydroxyzine makes her drowsy.  As a [id number], she does look tired-appearing overall.  She denies taking any other oral antihistamines.  She does not use any nasal steroids.  She does not like the taste of azelastine, so she rarely uses it.  Besides the occasional itchy nose and itchy/watery eyes, she has no other symptoms.  She feels that allergen immunotherapy was effective.  She only wants to restart it because she wants to complete a full 5-year treatment.    On the other hand, she has had multiple episodes of shortness of breath.  In the past, I asked her whether albuterol was helpful, and she was not sure.  She is not sure about it today either.  Dyspnea is mainly on exertion.  She may also wake up  with shortness of breath and gasping for some air.  She does not use albuterol at the time.  Sometimes, she feels that if she eats something, it helps.  She feels that sometimes it is positional.  She does have a pending appointment with sleep medicine.      Patient Active Problem List   Diagnosis     Animal dander allergy     CARDIOVASCULAR SCREENING; LDL GOAL LESS THAN 160     Psychosis (H)     Paranoid type delusional disorder (H)     Bipolar 1 disorder (H)     Insomnia     Depression with anxiety     Seasonal allergic rhinitis due to pollen     Allergic rhinitis due to mold     Allergic rhinitis due to animal dander     Allergic rhinitis due to dust mite     Encounter for IUD insertion     Schizoaffective disorder, bipolar type (H)     Gastroesophageal reflux disease without esophagitis     Paranoia (psychosis) (H)     Obesity (BMI 35.0-39.9) with comorbidity (H)     Schizoaffective disorder (H)       Past Medical History:   Diagnosis Date     Bipolar 1 disorder (H) 12/6/2012     Depressive disorder      Paranoid type delusional disorder (H) 11/14/2012      *Patient is Adopted       Problem (# of Occurrences) Relation (Name,Age of Onset)    Hypertension (1) Sister    Unknown/Adopted (3) Mother, Father, Other        Past Surgical History:   Procedure Laterality Date     TONSILLECTOMY & ADENOIDECTOMY      as a child     TONSILLECTOMY & ADENOIDECTOMY       Social History     Socioeconomic History     Marital status: Single     Spouse name: None     Number of children: None     Years of education: None     Highest education level: None   Occupational History     Employer: CURRENTLY UNEMPLOYED AT THIS TIME   Tobacco Use     Smoking status: Never Smoker     Smokeless tobacco: Never Used     Tobacco comment: around 2nd hand smoke   Substance and Sexual Activity     Alcohol use: Not Currently     Comment: rare wine ( very rare)     Drug use: No     Sexual activity: Not Currently     Partners: Male     Birth  control/protection: I.U.D.   Other Topics Concern     Parent/sibling w/ CABG, MI or angioplasty before 65F 55M? No     Comment: patient was adopted; unknown family history   Social History Narrative    One child    Education: some college        October 21, 2021    ENVIRONMENTAL HISTORY: [place] The home is heated with a electric furnace. They do not have central air conditioning, does have box air conditioner in the wall and has air purifier. The patient's bedroom is furnished with stuffed animals in bed, carpeting in bedroom and fabric window coverings. Pets inside the apartment includes 1 cat. There is history of cockroach or mice infestation. There are no smokers in the house.  The apartment does not have a basement.      Social Determinants of Health     Financial Resource Strain:      Difficulty of Paying Living Expenses:    Food Insecurity:      Worried About Running Out of Food in the Last Year:      Ran Out of Food in the Last Year:    Transportation Needs:      Lack of Transportation (Medical):      Lack of Transportation (Non-Medical):    Physical Activity:      Days of Exercise per Week:      Minutes of Exercise per Session:    Stress:      Feeling of Stress :    Social Connections:      Frequency of Communication with Friends and Family:      Frequency of Social Gatherings with Friends and Family:      Attends Taoism Services:      Active Member of Clubs or Organizations:      Attends Club or Organization Meetings:      Marital Status:    Intimate Partner Violence:      Fear of Current or Ex-Partner:      Emotionally Abused:      Physically Abused:      Sexually Abused:            Review of Systems   Constitutional: Positive for fatigue and unexpected weight change (weight gain).   HENT: Negative for congestion, ear pain, facial swelling, nosebleeds, postnasal drip, rhinorrhea, sinus pressure and sneezing.    Eyes: Positive for discharge and  itching. Negative for redness.   Respiratory: Negative for cough, chest tightness, shortness of breath and wheezing.    Cardiovascular: Negative for chest pain.   Gastrointestinal: Negative for diarrhea, nausea and vomiting.   Skin: Negative for rash.   Allergic/Immunologic: Positive for environmental allergies.   Neurological: Negative for headaches.   Hematological: Negative for adenopathy.           Current Outpatient Medications:      albuterol (PROAIR HFA/PROVENTIL HFA/VENTOLIN HFA) 108 (90 Base) MCG/ACT inhaler, Inhale 2 puffs into the lungs every 4 hours as needed for wheezing or shortness of breath / dyspnea, Disp: 18 g, Rfl: 3     EPINEPHrine (EPIPEN/ADRENACLICK/OR ANY BX GENERIC EQUIV) 0.3 MG/0.3ML injection 2-pack, Inject 0.3 mLs (0.3 mg) into the muscle as needed for anaphylaxis, Disp: 0.6 mL, Rfl: 3     levonorgestrel-ethinyl estradiol (AVIANE) 0.1-20 MG-MCG tablet, Take 1 tablet by mouth daily, Disp: 84 tablet, Rfl: 3     levothyroxine (SYNTHROID/LEVOTHROID) 25 MCG tablet, Take 1 tablet by mouth every morning (before breakfast), Disp: , Rfl:      lithium ER (LITHOBID) 300 MG CR tablet, , Disp: , Rfl:      Melatonin 10 MG TABS tablet, Take 10 mg by mouth nightly as needed for sleep, Disp: , Rfl:      VRAYLAR 6 MG CAPS capsule, Take 6 mg by mouth At Bedtime , Disp: , Rfl:      benztropine (COGENTIN) 0.5 MG tablet, Take 0.5 mg by mouth daily as needed  (Patient not taking: Reported on 10/21/2021), Disp: , Rfl:      lactobacillus rhamnosus, GG, (CULTURELL) capsule, Take 1 capsule by mouth daily (Patient not taking: Reported on 10/21/2021), Disp: , Rfl:      lithium ER (LITHOBID) 300 MG CR tablet, Take 4 tablets (1,200 mg) by mouth At Bedtime, Disp: 120 tablet, Rfl: 0     ORDER FOR ALLERGEN IMMUNOTHERAPY, Cat Hair, Standardized 10,000 BAU/mL, ALK  3.0 ml Dog Hair Dander, A. P.  1:100 w/v, HS  1.0 ml Dust Mites F 30,000AU/mL, HS  0.5 ml Dust Mites P. 30,000 AU/mL, HS  0.5 ml  Diluent: HSA qs to 5ml (Patient  not taking: Reported on 10/21/2021), Disp: 5 mL, Rfl: PRN     ORDER FOR ALLERGEN IMMUNOTHERAPY, Alternaria Tenuis 1:10 w/v, HS  0.5 ml Epicoccum Nigrum 1:10 w/v, HS 0.5 ml Hormodendrum Cladosporioides 1:10 w/v, HS 0.5 ml Diluent: HSA qs to 5ml (Patient not taking: Reported on 10/21/2021), Disp: 5 mL, Rfl: PRN     ORDER FOR ALLERGEN IMMUNOTHERAPY, Estuardo, White  1:20 w/v, HS  0.5 ml Birch Mix PRW 1:20 w/v, HS  0.5 ml Elm, American 1:20 w/v, HS  0.5 ml Hackberry 1:20 w/v, HS 0.5 ml Hickory, Shagbark 1:20 w/v, HS  0.5 ml [place] Mix RW 1:20 w/v, HS 0.5 ml Oak Mix RVW 1:20 w/v, HS 0.5 ml Palmer Tree, Black 1:20 w/v, HS 0.5 ml [place], Black 1:20 w/v, HS 0.5 ml Diluent: HSA qs to 5ml (Patient not taking: Reported on 10/21/2021), Disp: 5 mL, Rfl: PRN     ORDER FOR ALLERGEN IMMUNOTHERAPY, Kochia 1:20 w/v, HS 1.0 ml Nettle 1:20 w/v, HS 1.0 ml Plantain, English 1:20 w/v, HS 1.0 ml Ragweed Mixed 1:20 w/v ALK  0.6 ml Russian Thistle 1:20 w/v, HS 1.0 ml Diluent: HSA qs to 5ml (Patient not taking: Reported on 10/21/2021), Disp: 5 mL, Rfl: PRN     terbinafine (LAMISIL AT) 1 % external cream, Apply topically 2 times daily (Patient not taking: Reported on 7/13/2021), Disp: 12 g, Rfl: 1  Immunization History   Administered Date(s) Administered     COVID-19,PF,Pfizer 05/18/2021, 06/08/2021     DTAP (<7y) 03/01/1985, 06/01/1985, 07/01/1985, 07/01/1986, 07/26/1990     HPV Quadrivalent 12/12/2008, 09/06/2011     Hep B, Peds or Adolescent 02/16/1998     Historical DTP/aP 03/01/1985, 06/01/1985, 07/01/1985, 07/01/1986, 07/26/1990     Historical Hepb 02/16/1998     Influenza (IIV3) PF 12/09/2008, 12/06/2012     Influenza Vaccine IM > 6 months Valent IIV4 (Alfuria,Fluzone) 11/21/2017, 01/03/2019, 09/17/2019, 09/16/2020     MMR 02/27/1986, 07/17/1997     Poliovirus, inactivated (IPV) 06/01/1985, 07/01/1985, 01/01/1987, 07/26/1990     TDAP Vaccine (Adacel) 06/30/1997, 03/31/2008, 05/14/2010, 12/06/2012     Td (Adult), Adsorbed 06/30/1997      Allergies   Allergen Reactions     Dust Mites Shortness Of Breath     Animal Dander      Other reaction(s): *Unknown     Metformin Fatigue     Mold      Other reaction(s): Runny Nose     Trees      OBJECTIVE:                                                                 BP (!) 131/98 (BP Location: Left arm, Patient Position: Sitting, Cuff Size: Adult Large)   Pulse 114   Temp 99  F (37.2  C) (Tympanic)   Wt 115.4 kg (254 lb 6.6 oz)   SpO2 95%   BMI 44.36 kg/m          Physical Exam  Vitals and nursing note reviewed.   Constitutional:       Appearance: She is not diaphoretic.      Comments: Tired appearing   HENT:      Head: Normocephalic and atraumatic.      Right Ear: Tympanic membrane, ear canal and external ear normal.      Left Ear: Tympanic membrane, ear canal and external ear normal.      Nose: Septal deviation (mild, to the right) present. No mucosal edema or rhinorrhea.      Mouth/Throat:      Mouth: Mucous membranes are moist.      Pharynx: Oropharynx is clear. No oropharyngeal exudate.   Eyes:      General:         Right eye: No discharge.         Left eye: No discharge.      Conjunctiva/sclera:      Right eye: Right conjunctiva is not injected.      Left eye: Left conjunctiva is not injected.   Cardiovascular:      Rate and Rhythm: Normal rate and regular rhythm.      Heart sounds: Normal heart sounds. No murmur heard.     Pulmonary:      Effort: Pulmonary effort is normal. No respiratory distress.      Breath sounds: Normal breath sounds and air entry. No decreased breath sounds, wheezing, rhonchi or rales.   Musculoskeletal:         General: Normal range of motion.      Cervical back: Normal range of motion.   Lymphadenopathy:      Cervical: No cervical adenopathy.   Skin:     General: Skin is warm.   Psychiatric:      Comments: Pressured speech.  Flat affect.  Poor eye contact         ASSESSMENT/PLAN:    Chronic seasonal allergic rhinitis due to pollen  Allergic rhinitis due to  mold  Allergic rhinitis due to dust mite  Allergic rhinitis due to animal dander    Overall, her symptoms are well controlled with azelastine on as-needed basis.  -I recommend she take TicTac fresh mint if it bothers her.  Otherwise, we will need to restart intranasal steroids, although she has symptoms occasionally.  Considering increased anxiety and shortness of breath, I do not think that she is a good candidate for allergen immunotherapy at this time.  Once it is figured out, we can discuss allergen immunotherapy again.    Dyspnea on exertion  At night, she has snoring and symptoms suggesting DYANA.  She also has dyspnea on exertion.  She gained more than 15 pounds within the last several months, which she attributes her psychiatry medications.   While it is the case, I recommend the patient to discuss that with her psychiatrist.  -Also recommend her to see a sleep medicine for DAYNA evaluation.  -I also recommend getting pulmonary function test.    - General PFT Lab (Please always keep checked)  - Pulmonary Function Test    Drowsiness    She does appear tired and sleepy.  States that hydroxyzine makes her feel that way.  I will stop prescribing her hydroxyzine for allergy reasons.  -She needs to talk to her therapist and to discuss an alternative for anxiety symptoms.    Return in about 3 months (around 1/21/2022), or if symptoms worsen or fail to improve.    Thank you for allowing us to participate in the care of this patient. Please feel free to contact us if there are any questions or concerns about the patient.    Disclaimer: This note consists of symbols derived from keyboarding, dictation and/or voice recognition software. As a result, there may be errors in the script that have gone undetected. Please consider this when interpreting information found in this chart.    Rudy Shin MD, FAAAAI, FACAAI  Allergy, Asthma and Immunology     [facility]         Again, thank you for allowing me to participate in the care of your patient.        Sincerely,        Rudy Shin MD

## 2021-10-21 NOTE — PATIENT INSTRUCTIONS
Call to schedule pulmonary function test:    [phone]      See Sleep medicine folks.       -Continue using albuterol inhaler 2-4 puffs every 4 hours as needed for chest tightness/wheezing/shortness of breath/persistent cough.    Stop taking hydroxyzine for allergy purposes.     Ask your psych specialist if they can prescribe you different meds for anxiety since Atarax makes you so drowsy.       -Use azelastine 2 sprays in each nostril twice a day when necessary. Take tic tac mints if you feel that azelastine taste is unpleasant.               Allergy Staff Appt Hours Shot Hours Locations    Physician     Rudy Shin MD       Support Staff     Callie Lama LPN     Quinn CMA    Tuesday:   [place] 7-5 Wednesday:  [place]: 7-5 Thursday:         [facility] 7-5 Friday:  [facility] 7-3  [place]        Thursday: 7-5:20        Friday: 7-12:20     [place]        Tuesday: 7- 4:20 Wednesday: 7-4:20 [place] Monday: 7-2:20 Tuesday: 9-5:20 Thursday: 7-2:20    [place]       Tues & Wed: 7-5:20 Mon & Thurs: 7-4:20       Fri: 7-2:20           [facility]  [address] [place] [address]  Appt Line: [phone]      [facility]  [address] [place] [address]  Appt Line: [phone]       Important Scheduling Information (if recommended by provider):  Aspirin Desensitization: Appt will last 2 clinic days. Please call the Allergy RN line for your clinic to schedule. Discontinue antihistamines 7 days prior to the appointment.     Food Challenges: Appt will last 3-4 hours. Please call the Allergy RN line for your clinic to schedule. Discontinue antihistamines 7 days prior to the appointment.     Penicillin Testing: Appt will last 2-3 hours. Please call the Allergy RN line for your clinic to schedule. Discontinue antihistamines 7 days prior to the appointment.     Skin Testing: Appt will about 40 minutes. Call the appointment line for your  clinic to schedule. Discontinue antihistamines 7 days prior to the appointment.     Thank you for trusting us with your Allergy, Asthma, and Immunology care. Please feel free to contact us with any questions or concerns you may have.

## 2021-10-21 NOTE — PROGRESS NOTES
SUBJECTIVE:                                                                   Maddy Ortiz presents today to our Allergy Clinic at [facility] for a follow up visit.  She is a 35 year old female with allergic rhinitis.  Percutaneous skin puncture testing for aeroallergens performed in November 2016 showed sensitivity to dog, cat, dust mite, molds, pollen of trees, grass, and weeds. In spring-summer 2018, she had a buildup using cluster schedule for allergen immunotherapy.  She reached maintenance dose in July 2018.  One episode of anaphylaxis on September 8, 2020, due to allergen immunotherapy, Red 1:1, 0.5mL  Was given epi x 2.  She tolerated a lower dose x 1 after that.        She states that she takes Atarax on as-needed basis for rhinitis symptoms and for anxiety.  These days, she reports increased anxiety due to her niece who just recently started dating a new person, and she is worried about the relationship.  She feels that hydroxyzine makes her drowsy.  As a [id number], she does look tired-appearing overall.  She denies taking any other oral antihistamines.  She does not use any nasal steroids.  She does not like the taste of azelastine, so she rarely uses it.  Besides the occasional itchy nose and itchy/watery eyes, she has no other symptoms.  She feels that allergen immunotherapy was effective.  She only wants to restart it because she wants to complete a full 5-year treatment.    On the other hand, she has had multiple episodes of shortness of breath.  In the past, I asked her whether albuterol was helpful, and she was not sure.  She is not sure about it today either.  Dyspnea is mainly on exertion.  She may also wake up with shortness of breath and gasping for some air.  She does not use albuterol at the time.  Sometimes, she feels that if she eats something, it helps.  She feels that sometimes it is positional.  She does have a pending appointment with sleep  medicine.      Patient Active Problem List   Diagnosis     Animal dander allergy     CARDIOVASCULAR SCREENING; LDL GOAL LESS THAN 160     Psychosis (H)     Paranoid type delusional disorder (H)     Bipolar 1 disorder (H)     Insomnia     Depression with anxiety     Seasonal allergic rhinitis due to pollen     Allergic rhinitis due to mold     Allergic rhinitis due to animal dander     Allergic rhinitis due to dust mite     Encounter for IUD insertion     Schizoaffective disorder, bipolar type (H)     Gastroesophageal reflux disease without esophagitis     Paranoia (psychosis) (H)     Obesity (BMI 35.0-39.9) with comorbidity (H)     Schizoaffective disorder (H)       Past Medical History:   Diagnosis Date     Bipolar 1 disorder (H) 12/6/2012     Depressive disorder      Paranoid type delusional disorder (H) 11/14/2012      *Patient is Adopted       Problem (# of Occurrences) Relation (Name,Age of Onset)    Hypertension (1) Sister    Unknown/Adopted (3) Mother, Father, Other        Past Surgical History:   Procedure Laterality Date     TONSILLECTOMY & ADENOIDECTOMY      as a child     TONSILLECTOMY & ADENOIDECTOMY       Social History     Socioeconomic History     Marital status: Single     Spouse name: None     Number of children: None     Years of education: None     Highest education level: None   Occupational History     Employer: CURRENTLY UNEMPLOYED AT THIS TIME   Tobacco Use     Smoking status: Never Smoker     Smokeless tobacco: Never Used     Tobacco comment: around 2nd hand smoke   Substance and Sexual Activity     Alcohol use: Not Currently     Comment: rare wine ( very rare)     Drug use: No     Sexual activity: Not Currently     Partners: Male     Birth control/protection: I.U.D.   Other Topics Concern     Parent/sibling w/ CABG, MI or angioplasty before 65F 55M? No     Comment: patient was adopted; unknown family history   Social History Narrative    One child    Education: some college        October  21, 2021    ENVIRONMENTAL HISTORY: [place] The home is heated with a electric furnace. They do not have central air conditioning, does have box air conditioner in the wall and has air purifier. The patient's bedroom is furnished with stuffed animals in bed, carpeting in bedroom and fabric window coverings. Pets inside the apartment includes 1 cat. There is history of cockroach or mice infestation. There are no smokers in the house.  The apartment does not have a basement.      Social Determinants of Health     Financial Resource Strain:      Difficulty of Paying Living Expenses:    Food Insecurity:      Worried About Running Out of Food in the Last Year:      Ran Out of Food in the Last Year:    Transportation Needs:      Lack of Transportation (Medical):      Lack of Transportation (Non-Medical):    Physical Activity:      Days of Exercise per Week:      Minutes of Exercise per Session:    Stress:      Feeling of Stress :    Social Connections:      Frequency of Communication with Friends and Family:      Frequency of Social Gatherings with Friends and Family:      Attends Orthodox Services:      Active Member of Clubs or Organizations:      Attends Club or Organization Meetings:      Marital Status:    Intimate Partner Violence:      Fear of Current or Ex-Partner:      Emotionally Abused:      Physically Abused:      Sexually Abused:            Review of Systems   Constitutional: Positive for fatigue and unexpected weight change (weight gain).   HENT: Negative for congestion, ear pain, facial swelling, nosebleeds, postnasal drip, rhinorrhea, sinus pressure and sneezing.    Eyes: Positive for discharge and itching. Negative for redness.   Respiratory: Negative for cough, chest tightness, shortness of breath and wheezing.    Cardiovascular: Negative for chest pain.   Gastrointestinal: Negative for diarrhea, nausea and vomiting.   Skin: Negative for rash.    Allergic/Immunologic: Positive for environmental allergies.   Neurological: Negative for headaches.   Hematological: Negative for adenopathy.           Current Outpatient Medications:      albuterol (PROAIR HFA/PROVENTIL HFA/VENTOLIN HFA) 108 (90 Base) MCG/ACT inhaler, Inhale 2 puffs into the lungs every 4 hours as needed for wheezing or shortness of breath / dyspnea, Disp: 18 g, Rfl: 3     EPINEPHrine (EPIPEN/ADRENACLICK/OR ANY BX GENERIC EQUIV) 0.3 MG/0.3ML injection 2-pack, Inject 0.3 mLs (0.3 mg) into the muscle as needed for anaphylaxis, Disp: 0.6 mL, Rfl: 3     levonorgestrel-ethinyl estradiol (AVIANE) 0.1-20 MG-MCG tablet, Take 1 tablet by mouth daily, Disp: 84 tablet, Rfl: 3     levothyroxine (SYNTHROID/LEVOTHROID) 25 MCG tablet, Take 1 tablet by mouth every morning (before breakfast), Disp: , Rfl:      lithium ER (LITHOBID) 300 MG CR tablet, , Disp: , Rfl:      Melatonin 10 MG TABS tablet, Take 10 mg by mouth nightly as needed for sleep, Disp: , Rfl:      VRAYLAR 6 MG CAPS capsule, Take 6 mg by mouth At Bedtime , Disp: , Rfl:      benztropine (COGENTIN) 0.5 MG tablet, Take 0.5 mg by mouth daily as needed  (Patient not taking: Reported on 10/21/2021), Disp: , Rfl:      lactobacillus rhamnosus, GG, (CULTURELL) capsule, Take 1 capsule by mouth daily (Patient not taking: Reported on 10/21/2021), Disp: , Rfl:      lithium ER (LITHOBID) 300 MG CR tablet, Take 4 tablets (1,200 mg) by mouth At Bedtime, Disp: 120 tablet, Rfl: 0     ORDER FOR ALLERGEN IMMUNOTHERAPY, Cat Hair, Standardized 10,000 BAU/mL, ALK  3.0 ml Dog Hair Dander, A. P.  1:100 w/v, HS  1.0 ml Dust Mites F 30,000AU/mL, HS  0.5 ml Dust Mites P. 30,000 AU/mL, HS  0.5 ml  Diluent: HSA qs to 5ml (Patient not taking: Reported on 10/21/2021), Disp: 5 mL, Rfl: PRN     ORDER FOR ALLERGEN IMMUNOTHERAPY, Alternaria Tenuis 1:10 w/v, HS  0.5 ml Epicoccum Nigrum 1:10 w/v, HS 0.5 ml Hormodendrum Cladosporioides 1:10 w/v, HS 0.5 ml Diluent: HSA qs to 5ml (Patient  not taking: Reported on 10/21/2021), Disp: 5 mL, Rfl: PRN     ORDER FOR ALLERGEN IMMUNOTHERAPY, Estuardo, White  1:20 w/v, HS  0.5 ml Birch Mix PRW 1:20 w/v, HS  0.5 ml Elm, American 1:20 w/v, HS  0.5 ml Hackberry 1:20 w/v, HS 0.5 ml Hickory, Shagbark 1:20 w/v, HS  0.5 ml [place] Mix RW 1:20 w/v, HS 0.5 ml Oak Mix RVW 1:20 w/v, HS 0.5 ml [place] Tree, Black 1:20 w/v, HS 0.5 ml [place], Black 1:20 w/v, HS 0.5 ml Diluent: HSA qs to 5ml (Patient not taking: Reported on 10/21/2021), Disp: 5 mL, Rfl: PRN     ORDER FOR ALLERGEN IMMUNOTHERAPY, Kochia 1:20 w/v, HS 1.0 ml Nettle 1:20 w/v, HS 1.0 ml Plantain, English 1:20 w/v, HS 1.0 ml Ragweed Mixed 1:20 w/v ALK  0.6 ml Russian Thistle 1:20 w/v, HS 1.0 ml Diluent: HSA qs to 5ml (Patient not taking: Reported on 10/21/2021), Disp: 5 mL, Rfl: PRN     terbinafine (LAMISIL AT) 1 % external cream, Apply topically 2 times daily (Patient not taking: Reported on 7/13/2021), Disp: 12 g, Rfl: 1  Immunization History   Administered Date(s) Administered     COVID-19,PF,Pfizer 05/18/2021, 06/08/2021     DTAP (<7y) 03/01/1985, 06/01/1985, 07/01/1985, 07/01/1986, 07/26/1990     HPV Quadrivalent 12/12/2008, 09/06/2011     Hep B, Peds or Adolescent 02/16/1998     Historical DTP/aP 03/01/1985, 06/01/1985, 07/01/1985, 07/01/1986, 07/26/1990     Historical Hepb 02/16/1998     Influenza (IIV3) PF 12/09/2008, 12/06/2012     Influenza Vaccine IM > 6 months Valent IIV4 (Alfuria,Fluzone) 11/21/2017, 01/03/2019, 09/17/2019, 09/16/2020     MMR 02/27/1986, 07/17/1997     Poliovirus, inactivated (IPV) 06/01/1985, 07/01/1985, 01/01/1987, 07/26/1990     TDAP Vaccine (Adacel) 06/30/1997, 03/31/2008, 05/14/2010, 12/06/2012     Td (Adult), Adsorbed 06/30/1997     Allergies   Allergen Reactions     Dust Mites Shortness Of Breath     Animal Dander      Other reaction(s): *Unknown     Metformin Fatigue     Mold      Other reaction(s): Runny Nose     Trees      OBJECTIVE:                                                                  BP (!) 131/98 (BP Location: Left arm, Patient Position: Sitting, Cuff Size: Adult Large)   Pulse 114   Temp 99  F (37.2  C) (Tympanic)   Wt 115.4 kg (254 lb 6.6 oz)   SpO2 95%   BMI 44.36 kg/m          Physical Exam  Vitals and nursing note reviewed.   Constitutional:       Appearance: She is not diaphoretic.      Comments: Tired appearing   HENT:      Head: Normocephalic and atraumatic.      Right Ear: Tympanic membrane, ear canal and external ear normal.      Left Ear: Tympanic membrane, ear canal and external ear normal.      Nose: Septal deviation (mild, to the right) present. No mucosal edema or rhinorrhea.      Mouth/Throat:      Mouth: Mucous membranes are moist.      Pharynx: Oropharynx is clear. No oropharyngeal exudate.   Eyes:      General:         Right eye: No discharge.         Left eye: No discharge.      Conjunctiva/sclera:      Right eye: Right conjunctiva is not injected.      Left eye: Left conjunctiva is not injected.   Cardiovascular:      Rate and Rhythm: Normal rate and regular rhythm.      Heart sounds: Normal heart sounds. No murmur heard.     Pulmonary:      Effort: Pulmonary effort is normal. No respiratory distress.      Breath sounds: Normal breath sounds and air entry. No decreased breath sounds, wheezing, rhonchi or rales.   Musculoskeletal:         General: Normal range of motion.      Cervical back: Normal range of motion.   Lymphadenopathy:      Cervical: No cervical adenopathy.   Skin:     General: Skin is warm.   Psychiatric:      Comments: Pressured speech.  Flat affect.  Poor eye contact         ASSESSMENT/PLAN:    Chronic seasonal allergic rhinitis due to pollen  Allergic rhinitis due to mold  Allergic rhinitis due to dust mite  Allergic rhinitis due to animal dander    Overall, her symptoms are well controlled with azelastine on as-needed basis.  -I recommend she take TicTac fresh mint if it bothers her.  Otherwise, we will need to restart intranasal  steroids, although she has symptoms occasionally.  Considering increased anxiety and shortness of breath, I do not think that she is a good candidate for allergen immunotherapy at this time.  Once it is figured out, we can discuss allergen immunotherapy again.    Dyspnea on exertion  At night, she has snoring and symptoms suggesting DAYNA.  She also has dyspnea on exertion.  She gained more than 15 pounds within the last several months, which she attributes her psychiatry medications.   While it is the case, I recommend the patient to discuss that with her psychiatrist.  -Also recommend her to see a sleep medicine for DAYNA evaluation.  -I also recommend getting pulmonary function test.    - General PFT Lab (Please always keep checked)  - Pulmonary Function Test    Drowsiness    She does appear tired and sleepy.  States that hydroxyzine makes her feel that way.  I will stop prescribing her hydroxyzine for allergy reasons.  -She needs to talk to her therapist and to discuss an alternative for anxiety symptoms.    Return in about 3 months (around 1/21/2022), or if symptoms worsen or fail to improve.    Thank you for allowing us to participate in the care of this patient. Please feel free to contact us if there are any questions or concerns about the patient.    Disclaimer: This note consists of symbols derived from keyboarding, dictation and/or voice recognition software. As a result, there may be errors in the script that have gone undetected. Please consider this when interpreting information found in this chart.    Rudy Shin MD, FAAAAI, FACAAI  Allergy, Asthma and Immunology     [facility]

## 2021-10-26 ENCOUNTER — OFFICE VISIT (OUTPATIENT)
Dept: FAMILY MEDICINE | Facility: CLINIC | Age: 36
End: 2021-10-26
Payer: MEDICARE

## 2021-10-26 ENCOUNTER — ANCILLARY PROCEDURE (OUTPATIENT)
Dept: GENERAL RADIOLOGY | Facility: CLINIC | Age: 36
End: 2021-10-26
Attending: NURSE PRACTITIONER
Payer: MEDICARE

## 2021-10-26 VITALS
WEIGHT: 252 LBS | BODY MASS INDEX: 43.02 KG/M2 | TEMPERATURE: 97.9 F | RESPIRATION RATE: 16 BRPM | OXYGEN SATURATION: 97 % | SYSTOLIC BLOOD PRESSURE: 132 MMHG | HEIGHT: 64 IN | HEART RATE: 99 BPM | DIASTOLIC BLOOD PRESSURE: 88 MMHG

## 2021-10-26 DIAGNOSIS — K42.9 UMBILICAL HERNIA WITHOUT OBSTRUCTION AND WITHOUT GANGRENE: ICD-10-CM

## 2021-10-26 DIAGNOSIS — R14.0 BLOATING: ICD-10-CM

## 2021-10-26 DIAGNOSIS — R14.0 BLOATING: Primary | ICD-10-CM

## 2021-10-26 PROCEDURE — 74019 RADEX ABDOMEN 2 VIEWS: CPT | Performed by: RADIOLOGY

## 2021-10-26 PROCEDURE — 99214 OFFICE O/P EST MOD 30 MIN: CPT | Performed by: NURSE PRACTITIONER

## 2021-10-26 ASSESSMENT — MIFFLIN-ST. JEOR: SCORE: 1810.12

## 2021-10-26 NOTE — PROGRESS NOTES
Assessment & Plan     Bloating  3 months of abdominal bloating and firmness.  Etiology unclear.  X-ray negative x2.  Will obtain CT scan for further evaluation.  - XR Abdomen 2 Views; Future  - CT Abdomen Pelvis w Contrast; Future    Umbilical hernia without obstruction and without gangrene  New umbilical hernia.  Currently asymptomatic.  Obtaining CT scan as part of work-up for bloating.  We will continue to monitor.  - XR Abdomen 2 Views; Future  - CT Abdomen Pelvis w Contrast; Future                 Return in about 2 weeks (around 11/9/2021).    The risks, benefits and treatment options of prescribed medications or other treatments have been discussed with the patient. The patient verbalized their understanding and should call or follow up if no improvement or if they develop further problems.    PHILL Luo CNP  [facility]        Terry Castañeda is a 35 year old who presents for the following health issues     HPI     Abdominal/Flank Pain  Onset/Duration: About 3 months of abdominal bloating, noticed lump at umbilicus about 2 months ago.  Description:   Character: Fullness and Cramping, pressure   Location: entire abdomen feels hard and bloated.  Radiation: no  Intensity: mild  Progression of Symptoms:  worsening  Accompanying Signs & Symptoms:  Fever/Chills: no  Gas/Bloating: YES   Nausea: no  Vomitting: YES- sensitive to smells   Diarrhea: YES  Constipation: no  Dysuria or Hematuria: no  History:   Trauma: no  Previous similar pain: no  Previous tests done: AXR 8/14/2021 - normal.  Precipitating factors:   Does the pain change with:     Food: no    Bowel Movement: no    Urination: no   Other factors:  no  Therapies tried and outcome: Gas X   No LMP recorded.          Wt Readings from Last 4 Encounters:   10/26/21 114.3 kg (252 lb)   10/21/21 115.4 kg (254 lb 6.6 oz)   09/23/21 108.9 kg (240 lb)   09/20/21 108.9 kg (240 lb)           Review of Systems   Constitutional,  "HEENT, cardiovascular, pulmonary, gi and gu systems are negative, except as otherwise noted.      Objective    /88   Pulse 99   Temp 97.9  F (36.6  C) (Tympanic)   Resp 16   Ht 1.613 m (5' 3.5\")   Wt 114.3 kg (252 lb)   SpO2 97%   BMI 43.94 kg/m    Body mass index is 43.94 kg/m .  Physical Exam   GENERAL: healthy, alert and no distress  RESP: lungs clear to auscultation - no rales, rhonchi or wheezes  CV: regular rate and rhythm, normal S1 S2, no S3 or S4, no murmur, click or rub, no peripheral edema and peripheral pulses strong  ABDOMEN: rounded and semi-firm. Nontender to palpation. + bowel sounds.  Umbilical hernia noted - no tenderness or discoloration - reducible.                 "

## 2021-10-28 ENCOUNTER — TRANSFERRED RECORDS (OUTPATIENT)
Dept: HEALTH INFORMATION MANAGEMENT | Facility: CLINIC | Age: 36
End: 2021-10-28

## 2021-10-29 ENCOUNTER — HOSPITAL ENCOUNTER (OUTPATIENT)
Dept: CT IMAGING | Facility: CLINIC | Age: 36
Discharge: HOME | End: 2021-10-29
Attending: NURSE PRACTITIONER | Admitting: NURSE PRACTITIONER
Payer: MEDICARE

## 2021-10-29 DIAGNOSIS — R14.0 BLOATING: ICD-10-CM

## 2021-10-29 DIAGNOSIS — K42.9 UMBILICAL HERNIA WITHOUT OBSTRUCTION AND WITHOUT GANGRENE: ICD-10-CM

## 2021-10-29 PROCEDURE — G1004 CDSM NDSC: HCPCS

## 2021-10-29 PROCEDURE — 250N000009 HC RX 250: Performed by: NURSE PRACTITIONER

## 2021-10-29 PROCEDURE — 250N000011 HC RX IP 250 OP 636: Performed by: NURSE PRACTITIONER

## 2021-10-29 RX ORDER — IOPAMIDOL 755 MG/ML
100 INJECTION, SOLUTION INTRAVASCULAR ONCE
Status: COMPLETED | OUTPATIENT
Start: 2021-10-29 | End: 2021-10-29

## 2021-10-29 RX ADMIN — SODIUM CHLORIDE 71 ML: 9 INJECTION, SOLUTION INTRAVENOUS at 13:09

## 2021-10-29 RX ADMIN — IOPAMIDOL 100 ML: 755 INJECTION, SOLUTION INTRAVENOUS at 13:09

## 2021-11-05 ENCOUNTER — OFFICE VISIT (OUTPATIENT)
Dept: FAMILY MEDICINE | Facility: CLINIC | Age: 36
End: 2021-11-05
Payer: MEDICARE

## 2021-11-05 VITALS
TEMPERATURE: 97.5 F | HEIGHT: 65 IN | WEIGHT: 260 LBS | DIASTOLIC BLOOD PRESSURE: 102 MMHG | BODY MASS INDEX: 43.32 KG/M2 | SYSTOLIC BLOOD PRESSURE: 166 MMHG | RESPIRATION RATE: 16 BRPM | HEART RATE: 94 BPM | OXYGEN SATURATION: 98 %

## 2021-11-05 DIAGNOSIS — Z51.6 NEED FOR DESENSITIZATION TO ALLERGENS: ICD-10-CM

## 2021-11-05 DIAGNOSIS — K76.0 FATTY LIVER: ICD-10-CM

## 2021-11-05 DIAGNOSIS — M79.671 PAIN IN BOTH FEET: Primary | ICD-10-CM

## 2021-11-05 DIAGNOSIS — M79.672 PAIN IN BOTH FEET: Primary | ICD-10-CM

## 2021-11-05 PROCEDURE — 99213 OFFICE O/P EST LOW 20 MIN: CPT | Performed by: NURSE PRACTITIONER

## 2021-11-05 RX ORDER — EPINEPHRINE 0.3 MG/.3ML
0.3 INJECTION SUBCUTANEOUS ONCE
Qty: 0.6 ML | Refills: 3 | Status: SHIPPED | OUTPATIENT
Start: 2021-11-05 | End: 2021-11-05

## 2021-11-05 RX ORDER — HYDROXYZINE HYDROCHLORIDE 10 MG/1
10 TABLET, FILM COATED ORAL DAILY PRN
Status: ON HOLD | COMMUNITY
Start: 2021-10-21 | End: 2022-01-11

## 2021-11-05 ASSESSMENT — MIFFLIN-ST. JEOR: SCORE: 1870.23

## 2021-11-05 NOTE — PROGRESS NOTES
Assessment & Plan     Pain in both feet  Patient requesting second opinion.  - Orthopedic  Referral; Future    Fatty liver  Discussed CT results.  Discussed need for weight loss - encouraged her to follow diet that the nutritionist recommended.  Follow up with weight management provider in 2 weeks as scheduled.    Need for desensitization to allergens  - EPINEPHrine (ANY BX GENERIC EQUIV) 0.3 MG/0.3ML injection 2-pack; Inject 0.3 mLs (0.3 mg) into the muscle once for 1 dose      The risks, benefits and treatment options of prescribed medications or other treatments have been discussed with the patient. The patient verbalized their understanding and should call or follow up if no improvement or if they develop further problems.    PHILL Luo CNP  [facility]          Terry Castañeda is a 35 year old who presents for the following health issues     HPI     Chief Complaint   Patient presents with     Musculoskeletal Problem     foot pain; wants a second opion     Results     discuss CT scan-  concerned about liver fat and bloating         Concern - foot pain  Onset: gradually worsening for a while now.  Description: patient reports she wants a second opinion regarding bilateral foot pain  R > L  Was seen by [facility]  Has pain on the top of foot  Intensity: mild when resting;  Up and walking it is more moderate to severe  Progression of Symptoms:  worsening  Accompanying Signs & Symptoms: swelling   Previous history of similar problem:   Precipitating factors:        Worsened by: maybe some of her medications are causing her feet to swell?  Alleviating factors:        Improved by: nothing, always has a dull ache  Therapies tried and outcome: naproxen and ibuprofen        CT results:  She is concerned about the fatty liver.  She has gained 60 lbs over the last 2 years - possibly due to psych medications.  She has seen the weight loss clinic - has a follow up appointment  "in 2 weeks.  Hasn't been able to stick to the diet from the nutritionist yet - however, states that this fatty liver is concerning her and she will work harder to eat healthier.        Review of Systems   Constitutional, HEENT, cardiovascular, pulmonary, gi and gu systems are negative, except as otherwise noted.      Objective    BP (!) 166/102 (BP Location: Right arm, Cuff Size: Adult Large)   Pulse 94   Temp 97.5  F (36.4  C) (Tympanic)   Resp 16   Ht 1.651 m (5' 5\")   Wt 117.9 kg (260 lb)   SpO2 98%   Breastfeeding No   BMI 43.27 kg/m    Body mass index is 43.27 kg/m .  Physical Exam   GENERAL: healthy, alert and no distress  NECK: no adenopathy, no asymmetry, masses, or scars and thyroid normal to palpation  RESP: lungs clear to auscultation - no rales, rhonchi or wheezes  CV: regular rate and rhythm, normal S1 S2, no S3 or S4, no murmur, click or rub, no peripheral edema and peripheral pulses strong  ABDOMEN: soft, nontender, no hepatosplenomegaly, no masses and bowel sounds normal  MS: no gross musculoskeletal defects noted, no edema                "

## 2021-11-08 ENCOUNTER — NURSE TRIAGE (OUTPATIENT)
Dept: NURSING | Facility: CLINIC | Age: 36
End: 2021-11-08

## 2021-11-08 ENCOUNTER — OFFICE VISIT (OUTPATIENT)
Dept: FAMILY MEDICINE | Facility: CLINIC | Age: 36
End: 2021-11-08
Payer: MEDICARE

## 2021-11-08 ENCOUNTER — NURSE TRIAGE (OUTPATIENT)
Dept: NURSING | Facility: CLINIC | Age: 36
End: 2021-11-08
Payer: MEDICARE

## 2021-11-08 VITALS
WEIGHT: 258.8 LBS | OXYGEN SATURATION: 98 % | HEART RATE: 93 BPM | HEIGHT: 65 IN | BODY MASS INDEX: 43.12 KG/M2 | DIASTOLIC BLOOD PRESSURE: 96 MMHG | TEMPERATURE: 98.9 F | SYSTOLIC BLOOD PRESSURE: 124 MMHG | RESPIRATION RATE: 16 BRPM

## 2021-11-08 DIAGNOSIS — R20.2 PARESTHESIA: Primary | ICD-10-CM

## 2021-11-08 DIAGNOSIS — F25.0 SCHIZOAFFECTIVE DISORDER, BIPOLAR TYPE (H): ICD-10-CM

## 2021-11-08 DIAGNOSIS — E11.9 TYPE 2 DIABETES MELLITUS WITHOUT COMPLICATION, WITHOUT LONG-TERM CURRENT USE OF INSULIN (H): Primary | ICD-10-CM

## 2021-11-08 DIAGNOSIS — R73.03 PREDIABETES: ICD-10-CM

## 2021-11-08 LAB
HBA1C MFR BLD: 6.9 % (ref 0–5.6)
VIT B12 SERPL-MCNC: 687 PG/ML (ref 193–986)

## 2021-11-08 PROCEDURE — 82607 VITAMIN B-12: CPT | Performed by: NURSE PRACTITIONER

## 2021-11-08 PROCEDURE — 36415 COLL VENOUS BLD VENIPUNCTURE: CPT | Performed by: NURSE PRACTITIONER

## 2021-11-08 PROCEDURE — 83036 HEMOGLOBIN GLYCOSYLATED A1C: CPT | Performed by: NURSE PRACTITIONER

## 2021-11-08 PROCEDURE — 99214 OFFICE O/P EST MOD 30 MIN: CPT | Performed by: NURSE PRACTITIONER

## 2021-11-08 RX ORDER — METFORMIN HCL 500 MG
500 TABLET, EXTENDED RELEASE 24 HR ORAL
Qty: 30 TABLET | Refills: 2 | Status: SHIPPED | OUTPATIENT
Start: 2021-11-08 | End: 2021-11-09

## 2021-11-08 ASSESSMENT — MIFFLIN-ST. JEOR: SCORE: 1864.79

## 2021-11-08 NOTE — PROGRESS NOTES
Assessment & Plan     Paresthesia  Patient feels like symptoms started 2 months ago after starting Lithium- recommend patient discuss/ follow up with psych regarding lithium side effects- will check for diabetes mellitus vs B12 def.   - Adult Neurology Referral; Future  - Hemoglobin A1c; Future  - Vitamin B12  - Hemoglobin A1c    Prediabetes     - Hemoglobin A1c; Future  - Hemoglobin A1c    Schizoaffective disorder, bipolar type (H)  Follow up as discussed in # 1.           No follow-ups on file.    PHILL Ramos CNP  [facility]    Terry Castañeda is a 35 year old who presents for the following health issues    Chief Complaint   Patient presents with     Pain     Pt here for pain in her feet and legs.     Pt was also seen on Friday for this.  HPI     Musculoskeletal problem/pain- symptoms started 2 1/2 months ago when she started Lithium.   Patient was seen on 11/5/21 and was given referral to orthopedics for the pain in her feet but states that the numbness in her legs and arms /pain and dull ache and did not want to see orthopedics.   Onset/Duration: ongoing, worse with exercise  Description  Location: foot and leg - bilateral, sometimes in her arms  Joint Swelling: maybe in feet and hands  Redness: no  Pain: YES  Warmth: no  Intensity:  4-8/10  Progression of Symptoms:  Worsening over the weekend due to exercise, walking, went shopping   Accompanying signs and symptoms:   Fevers: no  Numbness/tingling/weakness: YES- both numbness and tingling with sitting still, more of the pain when she is walking  History  Trauma to the area: no  Recent illness:  no  Previous similar problem: YES- since she started taking lithium 2 1/2 months ago  Previous evaluation:  YES  Precipitating or alleviating factors:  Aggravating factors include: walking  Therapies tried and outcome: acetaminophen and Ibuprofen - does not help        Review of Systems   Constitutional, HEENT, cardiovascular,  "pulmonary, GI, , musculoskeletal, neuro, skin, endocrine and psych systems are negative, except as otherwise noted.      Objective    BP (!) 124/96   Pulse 93   Temp 98.9  F (37.2  C) (Tympanic)   Resp 16   Ht 1.651 m (5' 5\")   Wt 117.4 kg (258 lb 12.8 oz)   SpO2 98%   BMI 43.07 kg/m    Body mass index is 43.07 kg/m .  Physical Exam   GENERAL: healthy, alert and no distress  RESP: lungs clear to auscultation - no rales, rhonchi or wheezes  CV: regular rate and rhythm, normal S1 S2, no S3 or S4, no murmur, click or rub, no peripheral edema and peripheral pulses strong  MS: no gross musculoskeletal defects noted, no edema  NEURO: Normal strength and tone, sensory exam grossly normal, light touch and pinprick normal and mentation intact                "

## 2021-11-08 NOTE — TELEPHONE ENCOUNTER
Pt has pain in her legs    Rates leg pain at 7/10     Pt is also having struggles with her mental illness and has an appointment     Per protocol - See PC within 24 hours     Care advice given per protocol and when to call back. Pt verbalized understanding and agrees to plan of care.    Eloina Lux RN  [place] Nurse Advisor  3:25 AM 11/8/2021      COVID 19 Nurse Triage Plan/Patient Instructions    Please be aware that novel coronavirus (COVID-19) may be circulating in the community. If you develop symptoms such as fever, cough, or SOB or if you have concerns about the presence of another infection including coronavirus (COVID-19), please contact your health care provider or visit [email or web address].     Disposition/Instructions    In-Person Visit with provider recommended. Reference Visit Selection Guide.    Thank you for taking steps to prevent the spread of this virus.  o Limit your contact with others.  o Wear a simple mask to cover your cough.  o Wash your hands well and often.    Resources    [facility]: About COVID-19: [email or web address]    CDC: What to Do If You're Sick: www.cdc.gov/coronavirus/2019-ncov/about/steps-when-sick.html    CDC: Ending Home Isolation: www.cdc.gov/coronavirus/2019-ncov/hcp/disposition-in-home-patients.html     CDC: Caring for Someone: www.cdc.gov/coronavirus/2019-ncov/if-you-are-sick/care-for-someone.html     [facility]: Interim Guidance for Hospital Discharge to Home: www.health.[facility].mn.us/diseases/coronavirus/hcp/hospdischarge.pdf    [facility] clinical trials (COVID-19 research studies): clinicalaffairs.[facility]/n-clinical-trials     Below are the COVID-19 hotlines at the [place] Department of Health ([facility]). Interpreters are available.   o For health questions: Call 763-030-4521 or 1-962.336.5338 (7 a.m. to 7 p.m.)  o For questions about schools and childcare: Call 324-653-7937 or 1-373.692.9227 (7 a.m. to 7 p.m.)  "                        Reason for Disposition    Numbness in a leg or foot (i.e., loss of sensation)    Additional Information    Negative: Looks like a broken bone or dislocated joint (e.g., crooked or deformed)    Negative: Sounds like a life-threatening emergency to the triager    Negative: Followed a leg injury    Negative: Leg swelling is main symptom    Negative: Back pain radiating (shooting) into leg(s)    Negative: Knee pain is main symptom    Negative: Ankle pain is main symptom    Negative: Pregnant    Negative: Postpartum (from 0 to 6 weeks after delivery)    Negative: Chest pain    Negative: Difficulty breathing    Negative: Entire foot is cool or blue in comparison to other side    Negative: Unable to walk    Negative: [1] Red area or streak AND [2] fever    Negative: [1] Swollen joint AND [2] fever    Negative: [1] Cast on leg or ankle AND [2] now increased pain    Negative: Patient sounds very sick or weak to the triager    Negative: [1] SEVERE pain (e.g., excruciating, unable to do any normal activities) AND [2] not improved after 2 hours of pain medicine    Negative: [1] Thigh or calf pain AND [2] only 1 side AND [3] present > 1 hour    Negative: [1] Thigh, calf, or ankle swelling AND [2] only 1 side    Negative: [1] Thigh, calf, or ankle swelling AND [2] bilateral AND [3] 1 side is more swollen    Negative: [1] Red area or streak AND [2] large (> 2 in. or 5 cm)    Negative: History of prior \"blood clot\" in leg or lungs (i.e., deep vein thrombosis, pulmonary embolism)    Negative: History of inherited increased risk of blood clots (e.g., Factor 5 Leiden, Anti-thrombin 3, Protein C or Protein S deficiency, Prothrombin mutation)    Negative: Major surgery in the past month    Negative: Hip or leg fracture (broken bone) in past month (or had cast on leg or ankle in past month)    Negative: Illness requiring prolonged bedrest in past month (e.g., immobilization, long hospital stay)    Negative: " "Long-distance travel in past month (e.g., car, bus, train, plane; with trip lasting 6 or more hours)    Negative: Cancer treatment in the past two months (or has cancer now)    Negative: [1] Painful rash AND [2] multiple small blisters grouped together (i.e., dermatomal distribution or \"band\" or \"stripe\")    Negative: Looks like a boil, infected sore, deep ulcer or other infected rash (spreading redness, pus)    Negative: [1] Localized rash is very painful AND [2] no fever    Protocols used: LEG PAIN-A-AH      "

## 2021-11-09 ENCOUNTER — TELEPHONE (OUTPATIENT)
Dept: FAMILY MEDICINE | Facility: CLINIC | Age: 36
End: 2021-11-09
Payer: MEDICARE

## 2021-11-09 DIAGNOSIS — E11.9 TYPE 2 DIABETES MELLITUS WITHOUT COMPLICATION, WITHOUT LONG-TERM CURRENT USE OF INSULIN (H): Primary | ICD-10-CM

## 2021-11-09 DIAGNOSIS — E11.9 TYPE 2 DIABETES MELLITUS WITHOUT COMPLICATION, WITHOUT LONG-TERM CURRENT USE OF INSULIN (H): ICD-10-CM

## 2021-11-09 RX ORDER — GLIPIZIDE 5 MG/1
5 TABLET, FILM COATED, EXTENDED RELEASE ORAL DAILY
Qty: 30 TABLET | Refills: 2 | Status: SHIPPED | OUTPATIENT
Start: 2021-11-09 | End: 2021-12-13 | Stop reason: ALTCHOICE

## 2021-11-09 NOTE — TELEPHONE ENCOUNTER
"\"I have a bad time with my memory sometimes. I just want to make sure that I should continue to take my psych meds. I found out that I likely have type 2 diabetes.\" Maddy states she thinks the pain in her arms and legs is caused by her diabetes. \"I want to get off all my psych meds until my diabetes is better managed.\" Maddy states she sees her psych provider at [facility] on Thursday in person. Patient is scheduled for an appointment with Dr. Garza ([facility]) on Wed. 11/10.     Triage guidelines recommend for Maddy to follow up with her psych provider in regards to the Lithium that is prescribed. [facility] RN advised that patient should continuing taking all meds, including her psych meds (Lithium), until a provider instructs her otherwise. RN advised discussing Lithium concerns with her psych provider and to continue taking all medications as prescribed. In addition, patient is requesting a second opinion in regards to her psych meds. [facility] RN advised to consult with her current psych provider on the best plan of care. RN also advised that Metformin was [facility] and [facility] in [place] and that Maddy should pick this up. Patient states she will do so on Tuesday, 11/9. [facility] RN advised to call back with any further questions or concerns. Patient verbalized understanding of and agreement with plan and had no further questions.     Reason for Disposition    [1] Caller requesting NON-URGENT health information AND [2] PCP's office is the best resource    Protocols used: INFORMATION ONLY CALL - NO TRIAGE-ABNER-    Marianela Watson RN-BSN  [facility] Nurse Advisors   "

## 2021-11-09 NOTE — TELEPHONE ENCOUNTER
Accu-Chek Guide: Insurance will only cover max once daily testing.  Please update and resend.  JAL

## 2021-11-11 ENCOUNTER — TRANSFERRED RECORDS (OUTPATIENT)
Dept: HEALTH INFORMATION MANAGEMENT | Facility: CLINIC | Age: 36
End: 2021-11-11
Payer: MEDICARE

## 2021-11-14 ENCOUNTER — NURSE TRIAGE (OUTPATIENT)
Dept: NURSING | Facility: CLINIC | Age: 36
End: 2021-11-14
Payer: MEDICARE

## 2021-11-15 ENCOUNTER — HOSPITAL ENCOUNTER (OUTPATIENT)
Dept: RESPIRATORY THERAPY | Facility: CLINIC | Age: 36
Discharge: HOME | End: 2021-11-15
Attending: INTERNAL MEDICINE | Admitting: INTERNAL MEDICINE
Payer: MEDICARE

## 2021-11-15 ENCOUNTER — TELEPHONE (OUTPATIENT)
Dept: FAMILY MEDICINE | Facility: CLINIC | Age: 36
End: 2021-11-15
Payer: MEDICARE

## 2021-11-15 DIAGNOSIS — F25.0 SCHIZOAFFECTIVE DISORDER, BIPOLAR TYPE (H): Primary | ICD-10-CM

## 2021-11-15 DIAGNOSIS — R06.02 SHORTNESS OF BREATH: ICD-10-CM

## 2021-11-15 PROCEDURE — 94060 EVALUATION OF WHEEZING: CPT

## 2021-11-15 PROCEDURE — 94729 DIFFUSING CAPACITY: CPT

## 2021-11-15 PROCEDURE — 94729 DIFFUSING CAPACITY: CPT | Mod: 26 | Performed by: INTERNAL MEDICINE

## 2021-11-15 PROCEDURE — 250N000009 HC RX 250: Performed by: ALLERGY & IMMUNOLOGY

## 2021-11-15 PROCEDURE — 94060 EVALUATION OF WHEEZING: CPT | Performed by: INTERNAL MEDICINE

## 2021-11-15 PROCEDURE — 94726 PLETHYSMOGRAPHY LUNG VOLUMES: CPT | Mod: 26 | Performed by: INTERNAL MEDICINE

## 2021-11-15 PROCEDURE — 94726 PLETHYSMOGRAPHY LUNG VOLUMES: CPT

## 2021-11-15 RX ORDER — ALBUTEROL SULFATE 0.83 MG/ML
2.5 SOLUTION RESPIRATORY (INHALATION) ONCE
Status: COMPLETED | OUTPATIENT
Start: 2021-11-15 | End: 2021-11-15

## 2021-11-15 RX ADMIN — ALBUTEROL SULFATE 2.5 MG: 2.5 SOLUTION RESPIRATORY (INHALATION) at 08:25

## 2021-11-15 NOTE — TELEPHONE ENCOUNTER
I am getting so frustrated with my psych meds.  They cause other problems.They make me gain weight and have to buy new clothes. Difficult to find work. I don't want to take my meds anymore. I am prediabetic or Diabetic type 2. The meds are very expensive. My Drs want me to keep taking them. I cannot afford them. It is difficult to talk to the Dr about this--my provider transferred my care because my scan found something with my liver: fatty liver. My care was transferred back to [facility]. PCP Tigre Turk has my internal medicine information. Appointment was on Thursday, and no one got back to me Friday.   I have a neurology appointment in February. My legs get pretty sore. I think it is better when I don't take my meds, the soreness is less. My employers want me to do physical work but I have very bad gas and diarrhea, nausea and vomiting if I smell something bad. The side effects are too much for me. I am at my credit limit. Everything in my kitchen disgusts me because of my medications.  Medication prescribed by [facility]: DEIDRE Joshua. When I go into the hospital they want to give me old medications, experimenting on how medications affect internal medicine. Lithium affects renal functioning and internal organs, needing more medication.  I am not going to take my medications because they are difficult to tolerate. I can't comply with the nurse's advice to take my medications. I last took my medications on Friday night....  Advised to take medications as prescribed and to discuss her concerns about her medications and her physical problems related to taking them with the provider.   Advised to contact [facility] tomorrow and let them know she has not gotten a call back from their referral to [facility]. She needs a phone number to contact them. Discussed option of calling [facility] for assistance.     At end of call, patient states she will take her  "medications tonight and wait for [facility] psych to call her back.     Flor Nguyen RN Triage Nurse Advisor 9:57 PM 11/14/2021    Reason for Disposition    Caller has medication question, adult has minor symptoms, caller declines triage, AND triager answers question    Additional Information    Negative: Drug overdose and triager unable to answer question    Negative: Caller requesting information unrelated to medicine    Negative: Caller requesting a prescription for Strep throat and has a positive culture result    Negative: Rash while taking a medication or within 3 days of stopping it    Negative: Immunization reaction suspected    Negative: [1] Asthma and [2] having symptoms of asthma (cough, wheezing, etc.)    Negative: [1] Influenza symptoms AND [2] anti-viral med prescription request, such as Tamiflu    Negative: [1] Symptom of illness (e.g., headache, abdominal pain, earache, vomiting) AND [2] more than mild    Negative: MORE THAN A DOUBLE DOSE of a prescription or over-the-counter (OTC) drug    Negative: [1] DOUBLE DOSE (an extra dose or lesser amount) of over-the-counter (OTC) drug AND [2] any symptoms (e.g., dizziness, nausea, pain, sleepiness)    Negative: [1] DOUBLE DOSE (an extra dose or lesser amount) of prescription drug AND [2] any symptoms (e.g., dizziness, nausea, pain, sleepiness)    Negative: Took another person's prescription drug    Negative: [1] DOUBLE DOSE (an extra dose or lesser amount) of prescription drug AND [2] NO symptoms (Exception: a double dose of antibiotics)    Negative: Diabetes drug error or overdose (e.g., took wrong type of insulin or took extra dose)    Negative: [1] Request for URGENT new prescription or refill of \"essential\" medication (i.e., likelihood of harm to patient if not taken) AND [2] triager unable to fill per unit policy    Negative: [1] Prescription not at pharmacy AND [2] was prescribed by PCP recently    Negative: [1] Pharmacy calling with prescription " questions AND [2] triager unable to answer question    Negative: [1] Caller has URGENT medication question about med that PCP or specialist prescribed AND [2] triager unable to answer question    Negative: [1] Caller has NON-URGENT medication question about med that PCP prescribed AND [2] triager unable to answer question    Negative: [1] Caller requesting a NON-URGENT new prescription or refill AND [2] triager unable to refill per unit policy    Negative: [1] Caller has medication question about med not prescribed by PCP AND [2] triager unable to answer question (e.g., compatibility with other med, storage)    Negative: Caller requesting a CONTROLLED substance prescription refill (e.g., narcotics, ADHD medicines)    Negative: Caller wants to use a complementary or alternative medicine    Negative: [1] Prescription prescribed recently is not at pharmacy AND [2] triager has access to patient's EMR AND [3] prescription is recorded in the EMR    Negative: [1] DOUBLE DOSE (an extra dose or lesser amount) of over-the-counter (OTC) drug AND [2] NO symptoms    Negative: [1] DOUBLE DOSE (an extra dose or lesser amount) of antibiotic drug AND [2] NO symptoms    Negative: Caller has medication question only, adult not sick, and triager answers question    Protocols used: MEDICATION QUESTION CALL-A-

## 2021-11-15 NOTE — TELEPHONE ENCOUNTER
Reason for Call: Request for an order or referral       :phychiatric care referral    Order or referral being requested: order for CCPS from [facility] specifically    Date needed: as soon as possible    Has the patient been seen by the PCP for this problem? YES    Phone number Patient can be reached at:  Home number on file [phone] (home)    Best Time:  any    Can we leave a detailed message on this number?  YES    Call taken on 11/15/2021 at 9:56 AM by Ivanna Arciniega

## 2021-11-16 ENCOUNTER — NURSE TRIAGE (OUTPATIENT)
Dept: NURSING | Facility: CLINIC | Age: 36
End: 2021-11-16
Payer: MEDICARE

## 2021-11-16 NOTE — TELEPHONE ENCOUNTER
"Multiple c/o:financial stress, problems supporting daughter, plans to stop taking medications for bipolar, says she has gained 120 pounds on these meds. Unable to work while taking the meds.  Estephania Gordillo RN  [facility] Nurse Advisors    Reason for Disposition    Requesting regular office appointment    Additional Information    Negative: [1] Caller is not with the adult (patient) AND [2] reporting urgent symptoms    Negative: Lab result questions    Negative: Medication questions    Negative: Caller can't be reached by phone    Negative: Caller has already spoken to PCP or another triager    Negative: RN needs further essential information from caller in order to complete triage    Answer Assessment - Initial Assessment Questions  1. REASON FOR CALL or QUESTION: \"What is your reason for calling today?\" or \"How can I best help you?\" or \"What question do you have that I can help answer?\"      Needs help dealing with stress. Has a counselor here. Child's father lives in [place].    Protocols used: INFORMATION ONLY CALL - NO TRIAGE-A-AH      "

## 2021-11-17 NOTE — TELEPHONE ENCOUNTER
Left non-detailed message for patient to return call to clinic.     *need to clarify request for psych referral.  Jeniffer Cabello RN

## 2021-11-18 NOTE — TELEPHONE ENCOUNTER
2nd call  Left non-detailed message for patient to return call to clinic.     Jeniffer Cabello RN

## 2021-11-19 NOTE — TELEPHONE ENCOUNTER
Patient returns our call and she is told of psychiatry referral and stay with current psych team until taken over by Fv. Nicci PONCE RN

## 2021-11-19 NOTE — TELEPHONE ENCOUNTER
I signed a referral for psychiatry.  But not for CCPS.  Patient is not appropriate to be managed by primary care.  She will need long-term psychiatry care.  She should be managed by her current psychiatry team until she can get into psychiatry here at [facility].  Shirley Freeman, CNP

## 2021-11-19 NOTE — TELEPHONE ENCOUNTER
Shirley,    Please see pt's request for referral to psych for medication management.    Pt called back  tp clarify & states she had seen someone at [facility] this month & they were going to do an intake form for [facility] to take over meds.  Pt states she called behavioral health at [facility] & they said this intake form would not work & said she would need her provider to fill out a CCPS form.  Please advise.    Jeniffer Cabello RN

## 2021-11-20 ENCOUNTER — NURSE TRIAGE (OUTPATIENT)
Dept: NURSING | Facility: CLINIC | Age: 36
End: 2021-11-20
Payer: MEDICARE

## 2021-11-21 NOTE — TELEPHONE ENCOUNTER
"Pt called reporting multiple;e concerns that include having  diabetes, diabetes medication and how it will affect her in the long run.job issue, phyc meds, mental health diagnosi,and concern with a friend who yelled at her and called her \"pitch\". Pt reported she cant function and woke, room is messy, and doesn the minium that is required to clean her room. Pt reported she sleep a lot. Pt stated she has negative reaction of her diabetesdoesnt now how to check her blood sugar, and has not checked it yet. Pt reported she is confused.      Per protocol pt was advised to call 911 and she stated she will call her dad, Pt was again advised to call 911 du to her confusion.and she stated okay.       Oumar Duncan RN  [facility] Nurse Advisors       COVID 19 Nurse Triage Plan/Patient Instructions    Please be aware that novel coronavirus (COVID-19) may be circulating in the community. If you develop symptoms such as fever, cough, or SOB or if you have concerns about the presence of another infection including coronavirus (COVID-19), please contact your health care provider or visit [email or web address].     Disposition/Instructions    Call to EMS/911 recommended. Follow protocol based instructions.     Bring Your Own Device:  Please also bring your smart device(s) (smart phones, tablets, laptops) and their charging cables for your personal use and to communicate with your care team during your visit.    Thank you for taking steps to prevent the spread of this virus.  o Limit your contact with others.  o Wear a simple mask to cover your cough.  o Wash your hands well and often.    Resources    [facility]: About COVID-19: [email or web address]    CDC: What to Do If You're Sick: www.cdc.gov/coronavirus/2019-ncov/about/steps-when-sick.html    CDC: Ending Home Isolation: www.cdc.gov/coronavirus/2019-ncov/hcp/disposition-in-home-patients.html     CDC: Caring for Someone: " www.cdc.gov/coronavirus/2019-ncov/if-you-are-sick/care-for-someone.html     [facility]: Interim Guidance for Hospital Discharge to Home: www.health.[facility].mn.us/diseases/coronavirus/hcp/hospdischarge.pdf    [facility] clinical trials (COVID-19 research studies): clinicalaffairs.[facility]-clinical-trials     Below are the COVID-19 hotlines at the [place] Department of Health ([facility]). Interpreters are available.   o For health questions: Call 379-112-5489 or 1-166.367.6166 (7 a.m. to 7 p.m.)  o For questions about schools and childcare: Call 708-396-9416 or 1-734.299.9665 (7 a.m. to 7 p.m.)                                                     Reason for Disposition    Acting confused (e.g., disoriented, slurred speech)    Additional Information    Negative: Unconscious or difficult to awaken    Protocols used: DIABETES - HIGH BLOOD SUGAR-A-AH

## 2021-11-22 ENCOUNTER — TELEPHONE (OUTPATIENT)
Dept: PSYCHIATRY | Facility: CLINIC | Age: 36
End: 2021-11-22
Payer: MEDICARE

## 2021-11-22 LAB
DLCOCOR-%PRED-PRE: 97 %
DLCOCOR-PRE: 22.24 ML/MIN/MMHG
DLCOUNC-%PRED-PRE: 93 %
DLCOUNC-PRE: 21.45 ML/MIN/MMHG
DLCOUNC-PRED: 22.9 ML/MIN/MMHG
ERV-%PRED-PRE: 138 %
ERV-PRE: 0.74 L
ERV-PRED: 0.54 L
EXPTIME-PRE: 6.41 SEC
FEF2575-%PRED-POST: 131 %
FEF2575-%PRED-PRE: 95 %
FEF2575-POST: 4.27 L/SEC
FEF2575-PRE: 3.09 L/SEC
FEF2575-PRED: 3.24 L/SEC
FEFMAX-%PRED-PRE: 85 %
FEFMAX-PRE: 6.1 L/SEC
FEFMAX-PRED: 7.16 L/SEC
FEV1-%PRED-PRE: 78 %
FEV1-PRE: 2.36 L
FEV1FEV6-PRE: 86 %
FEV1FEV6-PRED: 84 %
FEV1FVC-PRE: 86 %
FEV1FVC-PRED: 84 %
FEV1SVC-PRE: 68 %
FEV1SVC-PRED: 77 %
FIFMAX-PRE: 3.79 L/SEC
FRCPLETH-%PRED-PRE: 85 %
FRCPLETH-PRE: 2.35 L
FRCPLETH-PRED: 2.73 L
FVC-%PRED-PRE: 77 %
FVC-PRE: 2.76 L
FVC-PRED: 3.57 L
GAW-%PRED-PRE: 70 %
GAW-PRE: 0.73 L/S/CMH2O
GAW-PRED: 1.03 L/S/CMH2O
IC-%PRED-PRE: 70 %
IC-PRE: 2.38 L
IC-PRED: 3.36 L
RVPLETH-%PRED-PRE: 78 %
RVPLETH-PRE: 1.23 L
RVPLETH-PRED: 1.56 L
SGAW-%PRED-PRE: 298 %
SGAW-PRE: 0.3 1/CMH2O*S
SGAW-PRED: 0.1 1/CMH2O*S
SRAW-%PRED-PRE: 69 %
SRAW-PRE: 3.29 CMH2O*S
SRAW-PRED: 4.76 CMH2O*S
TLCPLETH-%PRED-PRE: 92 %
TLCPLETH-PRE: 4.72 L
TLCPLETH-PRED: 5.11 L
VA-%PRED-PRE: 81 %
VA-PRE: 4.19 L
VC-%PRED-PRE: 89 %
VC-PRE: 3.5 L
VC-PRED: 3.9 L

## 2021-11-22 NOTE — TELEPHONE ENCOUNTER
PSYCHIATRY CLINIC PHONE INTAKE     SERVICES REQUESTED / INTERESTED IN          Med Management    Presenting Problem and Brief History                              What would you like to be seen for? (brief description):  Pt was diagnosed with borderline over 10 years, 4 300mg tablets of lithium, 6mg of vraylar. Pt also takes medication for her diabetes, but she believes its been making her sleepy all day. Pt also takes hydroxyzine as needed, but has;t taken it lately because she feels drowsy. Pt feels like she's been having side effects from her medication. Current psychiatrist recommended to go to the  of  due to her medical complexities.   Have you received a mental health diagnosis? Yes   Which one (s): Schizoeffective Bi-polar disorder, borderline oer  Is there any history of developmental delay?  No   Are you currently seeing a mental health provider?  Yes            Who / month last seen:  [facility] in [place]. Callie Cohen at [facility] in [place].   Do you have mental health records elsewhere?  Yes  Will you sign a release so we can obtain them?  Yes    Have you ever been hospitalized for psychiatric reasons?  Yes  Describe:  Pt was admitted in the summer of 2021    Do you have current thoughts of self-harm?  No .  Do you currently have thoughts of harming others?  No       Substance Use History     Do you have any history of alcohol / illicit drug use?  No  Describe:  However, pt was exposed to vapors from street drugs and believed it impacted her mental health  Have you ever received treatment for this?  No    Describe:  NA     Social History     Who is the patient's a guardian?  No    Name / number: NA  Have you had an ACT team in last 12 months?  No  Describe: NA   OK to leave a detailed voicemail?  Yes    Would you be interested in learning more about research opportunities for which you or your child may qualify? We can connect you with a team member for more  information.  Yes  If yes, send an inbasket message to Rosanna Raymond    Medical/ Surgical History                                   Patient Active Problem List   Diagnosis     Animal dander allergy     CARDIOVASCULAR SCREENING; LDL GOAL LESS THAN 160     Psychosis (H)     Paranoid type delusional disorder (H)     Bipolar 1 disorder (H)     Insomnia     Depression with anxiety     Seasonal allergic rhinitis due to pollen     Allergic rhinitis due to mold     Allergic rhinitis due to animal dander     Allergic rhinitis due to dust mite     Encounter for IUD insertion     Schizoaffective disorder, bipolar type (H)     Gastroesophageal reflux disease without esophagitis     Paranoia (psychosis) (H)     Obesity (BMI 35.0-39.9) with comorbidity (H)     Schizoaffective disorder (H)     Umbilical hernia without obstruction and without gangrene     Fatty liver          Medications             Current Outpatient Medications   Medication Sig Dispense Refill     albuterol (PROAIR HFA/PROVENTIL HFA/VENTOLIN HFA) 108 (90 Base) MCG/ACT inhaler Inhale 2 puffs into the lungs every 4 hours as needed for wheezing or shortness of breath / dyspnea 18 g 3     benztropine (COGENTIN) 0.5 MG tablet Take 0.5 mg by mouth daily as needed        blood glucose (NO BRAND SPECIFIED) lancets standard Use to test blood sugar 1 time daily or as directed. 100 lancet 1     blood glucose (NO BRAND SPECIFIED) test strip Use to test blood sugar 1 time daily or as directed. 90 strip 1     blood glucose monitoring (NO BRAND SPECIFIED) meter device kit Use to test blood sugar 3  times daily or as directed. 1 kit 0     glipiZIDE (GLUCOTROL XL) 5 MG 24 hr tablet Take 1 tablet (5 mg) by mouth daily 30 tablet 2     hydrOXYzine (ATARAX) 10 MG tablet 10 mg As needed       lactobacillus rhamnosus, GG, (CULTURELL) capsule Take 1 capsule by mouth daily        levonorgestrel-ethinyl estradiol (AVIANE) 0.1-20 MG-MCG tablet Take 1 tablet by mouth daily (Patient not  taking: Reported on 11/8/2021) 84 tablet 3     levothyroxine (SYNTHROID/LEVOTHROID) 25 MCG tablet Take 1 tablet by mouth every morning (before breakfast) (Patient not taking: Reported on 11/8/2021)       lithium ER (LITHOBID) 300 MG CR tablet Take 4 tablets (1,200 mg) by mouth At Bedtime 120 tablet 0     Melatonin 10 MG TABS tablet Take 10 mg by mouth nightly as needed for sleep        ORDER FOR ALLERGEN IMMUNOTHERAPY Cat Hair, Standardized 10,000 BAU/mL, ALK  3.0 ml  Dog Hair Dander, A. P.  1:100 w/v, HS  1.0 ml  Dust Mites F 30,000AU/mL, HS  0.5 ml  Dust Mites P. 30,000 AU/mL, HS  0.5 ml   Diluent: HSA qs to 5ml (Patient not taking: Reported on 10/21/2021) 5 mL PRN     ORDER FOR ALLERGEN IMMUNOTHERAPY Alternaria Tenuis 1:10 w/v, HS  0.5 ml  Epicoccum Nigrum 1:10 w/v, HS 0.5 ml  Hormodendrum Cladosporioides 1:10 w/v, HS 0.5 ml  Diluent: HSA qs to 5ml (Patient not taking: Reported on 10/21/2021) 5 mL PRN     ORDER FOR ALLERGEN IMMUNOTHERAPY Estuardo, White  1:20 w/v, HS  0.5 ml  Birch Mix PRW 1:20 w/v, HS  0.5 ml  Elm, American 1:20 w/v, HS  0.5 ml  Hackberry 1:20 w/v, HS 0.5 ml  Hickory, Shagbark 1:20 w/v, HS  0.5 ml  Mize Mix RW 1:20 w/v, HS 0.5 ml  Oak Mix RVW 1:20 w/v, HS 0.5 ml  [place] Tree, Black 1:20 w/v, HS 0.5 ml  Linn, Black 1:20 w/v, HS 0.5 ml  Diluent: HSA qs to 5ml (Patient not taking: Reported on 10/21/2021) 5 mL PRN     ORDER FOR ALLERGEN IMMUNOTHERAPY Kochia 1:20 w/v, HS 1.0 ml  Nettle 1:20 w/v, HS 1.0 ml  Plantain, English 1:20 w/v, HS 1.0 ml  Ragweed Mixed 1:20 w/v ALK  0.6 ml  Russian Thistle 1:20 w/v, HS 1.0 ml  Diluent: HSA qs to 5ml (Patient not taking: Reported on 10/21/2021) 5 mL PRN     terbinafine (LAMISIL AT) 1 % external cream Apply topically 2 times daily 12 g 1     VRAYLAR 6 MG CAPS capsule Take 6 mg by mouth At Bedtime            DISPOSITION      11/22/21 Intake complete. Scheduled w/  on 4/5/22 at 8:00am.     Neeru Omalley,

## 2021-11-23 ENCOUNTER — VIRTUAL VISIT (OUTPATIENT)
Dept: EDUCATION SERVICES | Facility: CLINIC | Age: 36
End: 2021-11-23
Attending: NURSE PRACTITIONER
Payer: MEDICARE

## 2021-11-23 DIAGNOSIS — E11.9 TYPE 2 DIABETES MELLITUS WITHOUT COMPLICATION, WITHOUT LONG-TERM CURRENT USE OF INSULIN (H): ICD-10-CM

## 2021-11-23 PROCEDURE — G0108 DIAB MANAGE TRN  PER INDIV: HCPCS | Mod: 95 | Performed by: DIETITIAN, REGISTERED

## 2021-11-23 NOTE — Clinical Note
Zoltan Watson (FYI only) - we had a good conversation last week. (only had 1 blood sugar at that time).  We did discuss GLP1s which might be a good option for her and it looks like she is meeting with Dr. Vivar today to review medications.     Thanks! Yaa Hampton RD, LD, [facility]  Diabetes Education

## 2021-11-23 NOTE — PROGRESS NOTES
"Diabetes Self-Management Education & Support    Presents for: Initial Assessment for new diagnosis,   Type of Service: Telephone Visit  Originating Location (Patient Location): Home  Distant Location (Provider Location): Home  Mode of Communication:  Telephone  Telephone Visit Start Time: 216  Telephone Visit End Time (telephone visit stop time): 246    SUBJECTIVE/OBJECTIVE:  Presents for: Initial Assessment for new diagnosis  Accompanied by: Self  Focus of Visit: Monitoring,Reducing Risks,Taking Medication,Healthy Eating  Diabetes type: Type 2  Other concerns:: None  Cultural Influences/Ethnic Background:  [place]    Diabetes Symptoms & Complications:  Complications assessed today?: No  Patient Problem List and Family Medical History reviewed for relevant medical history, current medical status, and diabetes risk factors.    Vitals:  There were no vitals taken for this visit.  Estimated body mass index is 43.07 kg/m  as calculated from the following:    Height as of 11/8/21: 1.651 m (5' 5\").    Weight as of 11/8/21: 117.4 kg (258 lb 12.8 oz).   Last 3 BP:   BP Readings from Last 3 Encounters:   11/08/21 (!) 124/96   11/05/21 (!) 166/102   10/26/21 132/88       History   Smoking Status     Never Smoker   Smokeless Tobacco     Never Used     Comment: around 2nd hand smoke       Labs:  Lab Results   Component Value Date    A1C 6.9 11/08/2021    A1C 4.7 06/09/2013     Lab Results   Component Value Date     09/20/2021    GLC 88 08/17/2020     Lab Results   Component Value Date     07/14/2021     Direct Measure HDL   Date Value Ref Range Status   07/14/2021 34 (L) >=50 mg/dL Final     Comment:     0-19 years:       Greater than or equal to 45 mg/dL   Low: Less than 40 mg/dL   Borderline low: 40-44 mg/dL     20 years and older:   Female: Greater than or equal to 50 mg/dL   Male:   Greater than or equal to 40 mg/dL        ]  GFR Estimate   Date Value Ref Range Status   09/20/2021 >90 >60 mL/min/1.73m2 Final    "  Comment:     As of July 11, 2021, eGFR is calculated by the CKD-EPI creatinine equation, without race adjustment. eGFR can be influenced by muscle mass, exercise, and diet. The reported eGFR is an estimation only and is only applicable if the renal function is stable.   08/17/2020 >90 >60 mL/min/[1.73_m2] Final     Comment:     Non  GFR Calc  Starting 12/18/2018, serum creatinine based estimated GFR (eGFR) will be   calculated using the Chronic Kidney Disease Epidemiology Collaboration   (CKD-EPI) equation.       GFR Estimate If Black   Date Value Ref Range Status   08/17/2020 >90 >60 mL/min/[1.73_m2] Final     Comment:      GFR Calc  Starting 12/18/2018, serum creatinine based estimated GFR (eGFR) will be   calculated using the Chronic Kidney Disease Epidemiology Collaboration   (CKD-EPI) equation.       Lab Results   Component Value Date    CR 0.71 09/20/2021    CR 0.80 08/17/2020     No results found for: MICROALBUMIN    Healthy Eating:  Healthy Eating Assessed Today: Yes  Meal planning/habits: Avoiding sweets,Smaller portions    Being Active:  Being Active Assessed Today: Yes    Monitoring:  Monitoring Assessed Today: Yes    Taking Medications:  Diabetes Medication(s)     Sulfonylureas       glipiZIDE (GLUCOTROL XL) 5 MG 24 hr tablet    Take 1 tablet (5 mg) by mouth daily          Taking Medication Assessed Today: Yes  Current Treatments: Oral Medication (taken by mouth)    Problem Solving:  Hypoglycemia signs/symptoms and treatment     Reducing Risks:  Reducing Risks Assessed Today: No    Healthy Coping:  Healthy Coping Assessed Today: Yes  Emotional response to diabetes: Ready to learn  Stage of change: ACTION (Actively working towards change)  Patient Activation Measure Survey Score:  LANA Score (Last Two) 8/19/2014   LANA Raw Score 30   Activation Score 37.3   LANA Level 1       Diabetes knowledge and skills assessment:   Patient is knowledgeable in diabetes management concepts  related to: Monitoring and Taking Medication  Continue AADE7 education on the following diabetes management concepts: Healthy Eating, Being Active, Monitoring, Taking Medication, Problem Solving, Reducing Risks and Healthy Coping  Based on learning assessment above, most appropriate setting for further diabetes education would be: Individual setting.      INTERVENTIONS:  Education provided today on:  AADE Self-Care Behaviors:  Diabetes Pathophysiology  Healthy Eating: carbohydrate counting, consistency in amount, composition, and timing of food intake and portion control  Being Active: relationship to blood glucose  Monitoring: purpose, proper technique, log and interpret results, individual blood glucose targets and frequency of monitoring  Taking Medication: action of prescribed medication and side effects of prescribed medications  Problem Solving: high blood glucose - causes, signs/symptoms, treatment and prevention, low blood glucose - causes, signs/symptoms, treatment and prevention, carrying a carbohydrate source at all times and when to call health care provider    Opportunities for ongoing education and support in diabetes-self management were discussed.  Pt verbalized understanding of concepts discussed and recommendations provided today.       Education Materials Provided:  [facility] Healthy Living with Diabetes Book      ASSESSMENT:  First Diabetes Education visit.   Community paramedic came help and learn how to use the glucometer.  Reviewed when to check, blood sugar results, technique again etc.  Spent time reviewing blood sugar targets and goals, how to evaluate post prandial readings etc.  Discussed carbohydrate basics and what impacts blood sugars, Maddy is familiar with what foods have carbohydrates.  Only has 1 reading of 266 post prandial.  Patient tried metformin in the past, but it made her tired and she is afraid to take it again, therefor a sulfonylurea was prescribed.  Reviewed the  pros and cons of this medication.  Maddy may benefit from trying a GLP1.  She notes some of her medications cause increased appetite and weight gain and the GLP1 might work well given it's satiety effect.      Patient's most recent  is meeting goal of <7.0  Lab Results   Component Value Date    A1C 6.9 11/08/2021    A1C 4.7 06/09/2013       PLAN  Continue with positive diet and lifestyle changes  Consider GLP1s over sulfonylureas, patient to discuss again with PCP     Anna Hampton RD, LD, [facility]  Diabetes Education    Time Spent: 30 minutes  Encounter Type: Individual    A copy of this encounter was shared with the provider.

## 2021-11-24 ENCOUNTER — TELEPHONE (OUTPATIENT)
Dept: FAMILY MEDICINE | Facility: CLINIC | Age: 36
End: 2021-11-24
Payer: MEDICARE

## 2021-11-24 NOTE — TELEPHONE ENCOUNTER
"S-(situation): Pt called reporting that she is feeling \"overwhelmed and is crying more than usual.\"  Pt explains that she is getting ready for Thanksgiving and feels overwhelmed by this.  Also, she had a court date recently and is stressed by this.  Pt says that she feels she is having racing thoughts at times.  Pt says that she feels like she is not doing well right now.  Pt denies that she feels that she is a possible harm to herself or others.  Pt has appt with her therapist at 2 pm today with [facility].   She asks if she should just wait and talk to her therapist?    B-(background): See problem list for multiple mental health diagnoses    A-(assessment): exacerbation of mental health symptoms of anxiety and racing thoughts.  Feels overwhelmed.  Denies thoughts harm herself or others.    R-(recommendations): Advised to keep her appt with her therapist at 2 pm today since it is in one hour.  Advised ED if feels that she cannot wait for her appt.  Advised 911 if pt feels she may harm herself or others.    Pt agrees with plan and intends to wait for therapist appt at this time.    Elvira Quinn RN      "

## 2021-11-26 ENCOUNTER — NURSE TRIAGE (OUTPATIENT)
Dept: NURSING | Facility: CLINIC | Age: 36
End: 2021-11-26
Payer: MEDICARE

## 2021-11-27 NOTE — TELEPHONE ENCOUNTER
Patient calling about blood sugars that are runny high. Patient states that she gets so drowsy. Morning readings are running 160-230.    Blood sugar at 2:30 pm was 250.   Current blood sugar is 305. Patient admits to increased thirst and drowsiness. Encouraged patient to drink water. Per care advice one 8 oz glass every hour for the next 4 hours.   Patient is on glipizide ER 5 mg tablets that she takes at night. Patient thinks she may have missed a couple doses but has been on track recently. Last missed dose was last week. Patient is not logging blood glucose readings. Patient states that she needs to get a dedicated notebook or journal for this. Asked patient if she has a wall calendar, which she said yes. Encouraged patient to write her readings on the calendar on each day and bring that with to her appointment. If her glucometer has any memory of recent readings to add those to the calendar.   Protocol recommends home care.   Care advice given. Patient will call back with any worsening symptoms. Patient verbalized understanding and is comfortable with this plan.   Sarah Yates RN   11/26/21 11:20 PM  [facility] Nurse Advisor    Routing message to PCP and pool.       Reason for Disposition    [1] Blood glucose > 300 mg/dL (16.7 mmol/L) AND [2] does not  use insulin (e.g., not insulin-dependent; most people with type 2 diabetes)    Additional Information    Negative: Unconscious or difficult to awaken    Negative: Acting confused (e.g., disoriented, slurred speech)    Negative: Very weak (e.g., can't stand)    Negative: Sounds like a life-threatening emergency to the triager    Negative: [1] Vomiting AND [2] signs of dehydration (e.g., very dry mouth, lightheaded, dark urine)    Negative: [1] Blood glucose > 240 mg/dL (13.3 mmol/L) AND [2] rapid breathing    Negative: Blood glucose > 500 mg/dL (27.8 mmol/L)    Negative: [1] Blood glucose > 240 mg/dL (13.3 mmol/L) AND [2] urine ketones moderate-large (or  more than 1+)    Negative: [1] Blood glucose > 240 mg/dL (13.3 mmol/L) AND [2] blood ketones > 1.4 mmol/L    Negative: [1] Blood glucose > 240 mg/dL (13.3 mmol/L) AND [2] vomiting AND [3] unable to check for ketones (in blood or urine)    Negative: [1] New onset diabetes suspected (e.g., frequent urination, weak, weight loss) AND [2] vomiting or rapid breathing    Negative: Vomiting lasts > 4 hours    Negative: Patient sounds very sick or weak to the triager    Negative: Fever > 100.4 F (38.0 C)    Negative: Blood glucose > 400 mg/dL (22.2 mmol/L)    Negative: [1] Blood glucose > 300 mg/dL (16.7 mmol/L) AND [2] two or more times in a row    Negative: Urine ketones moderate - large (or blood ketones > 1.4 mmol/L)    Negative: [1] Caller has URGENT medication or insulin pump question AND [2] triager unable to answer question    Negative: [1] Symptoms of high blood sugar (e.g., frequent urination, weak, weight loss) AND [2] not able to test blood glucose    Negative: New onset diabetes suspected (e.g., frequent urination, weakness, weight loss)    Protocols used: DIABETES - HIGH BLOOD SUGAR-A-

## 2021-11-28 ENCOUNTER — NURSE TRIAGE (OUTPATIENT)
Dept: NURSING | Facility: CLINIC | Age: 36
End: 2021-11-28
Payer: MEDICARE

## 2021-11-28 NOTE — RESULT ENCOUNTER NOTE
FUELUPt message sent:     Mild obstructive/restrictive pattern.  While there is some improvement after albuterol, it is not enough to call it a significant reversibility.  -I suggest the patient to set up an appointment to discuss her symptoms.

## 2021-11-28 NOTE — TELEPHONE ENCOUNTER
"  Reason for Disposition    Requesting to talk with a counselor (mental health worker, psychiatrist, etc.)    Additional Information    Negative: Patient attempted suicide    Negative: Patient is threatening suicide now    Negative: Violent behavior, or threatening to physically hurt or kill someone    Negative: [1] Patient is very confused (disoriented, slurred speech) AND [2] no other adult (e.g., friend or family member) available    Negative: [1] Difficult to awaken or acting very confused (disoriented, slurred speech) AND [2] new onset    Negative: Drug overdose suspected    Negative: Sounds like a life-threatening emergency to the triager    Negative: Alcohol use, abuse or dependence, question or problem related to    Negative: Drug abuse or dependence, question or problem related to    Negative: [1] Bipolar disorder (manic depression) AND [2] unable to do any of normal activities (e.g., self care, school, work; in comparison to baseline).    Negative: Head is twisting to one side (or ask \"is it turning against your will?\")    Negative: Patient sounds very sick or weak to the triager    Negative: [1] Bipolar disorder (manic depression) AND [2] worsening (e.g., thinking less clearly, more agitated, less able to do activities of daily living)    Negative: Increasing restlessness or pacing    Negative: Sometimes has thoughts of suicide    Negative: Sometimes has thoughts of killing or hurting others    Negative: Fever > 101 F (38.3 C)    Negative: Alcohol or drug abuse, known or suspected    Protocols used: BIPOLAR DISORDER (MANIC DEPRESSION)-A-    "

## 2021-11-28 NOTE — TELEPHONE ENCOUNTER
"Triage Call    Pt calling with report that she took her medication late and has been having \"crazy thoughts\"      Denies being suicidal, or having thoughts of hurting self or others,  but says she had been wondering if she needed to go to the ED and try to be admitted into the hospital for inpatient care for a while.    Pt says she doesn't know how else to get any psych care.  Says she couldn't get an appointment with a Psych doctor in the  system until April.    Triaged to disposition of See PCP within 3 days with request to talk with a psychiatrist.    Akiko Peace, RN      Reason for Disposition    [1] Caller requesting NON-URGENT health information AND [2] PCP's office is the best resource    Additional Information    Negative: [1] Caller is not with the adult (patient) AND [2] reporting urgent symptoms    Negative: Lab result questions    Negative: Medication questions    Negative: Caller can't be reached by phone    Negative: Caller has already spoken to PCP or another triager    Negative: RN needs further essential information from caller in order to complete triage    Negative: Requesting regular office appointment    Protocols used: INFORMATION ONLY CALL - NO TRIAGE-A-      "

## 2021-11-29 ENCOUNTER — TELEPHONE (OUTPATIENT)
Dept: FAMILY MEDICINE | Facility: CLINIC | Age: 36
End: 2021-11-29
Payer: MEDICARE

## 2021-11-29 NOTE — TELEPHONE ENCOUNTER
Patient having symptoms of hyperglycemia. See note. Please advise if any changes needed with medication. Thank you!    Routing comment      Sarah Yates RN 3 days ago     KK       Patient calling about blood sugars that are runny high. Patient states that she gets so drowsy. Morning readings are running 160-230.    Blood sugar at 2:30 pm was 250.   Current blood sugar is 305. Patient admits to increased thirst and drowsiness. Encouraged patient to drink water. Per care advice one 8 oz glass every hour for the next 4 hours.   Patient is on glipizide ER 5 mg tablets that she takes at night. Patient thinks she may have missed a couple doses but has been on track recently. Last missed dose was last week. Patient is not logging blood glucose readings. Patient states that she needs to get a dedicated notebook or journal for this. Asked patient if she has a wall calendar, which she said yes. Encouraged patient to write her readings on the calendar on each day and bring that with to her appointment. If her glucometer has any memory of recent readings to add those to the calendar.   Protocol recommends home care.   Care advice given. Patient will call back with any worsening symptoms. Patient verbalized understanding and is comfortable with this plan.   Sarah Yates RN          11/26/21 11:20 PM  [facility] Nurse Advisor     Routing message to PCP and pool.

## 2021-11-30 ENCOUNTER — OFFICE VISIT (OUTPATIENT)
Dept: FAMILY MEDICINE | Facility: CLINIC | Age: 36
End: 2021-11-30
Payer: MEDICARE

## 2021-11-30 ENCOUNTER — TELEPHONE (OUTPATIENT)
Dept: FAMILY MEDICINE | Facility: CLINIC | Age: 36
End: 2021-11-30

## 2021-11-30 VITALS
DIASTOLIC BLOOD PRESSURE: 72 MMHG | WEIGHT: 255.6 LBS | HEART RATE: 88 BPM | SYSTOLIC BLOOD PRESSURE: 116 MMHG | TEMPERATURE: 97.1 F | RESPIRATION RATE: 20 BRPM | BODY MASS INDEX: 42.53 KG/M2 | OXYGEN SATURATION: 98 %

## 2021-11-30 DIAGNOSIS — E11.9 TYPE 2 DIABETES MELLITUS WITHOUT COMPLICATION, WITHOUT LONG-TERM CURRENT USE OF INSULIN (H): Primary | ICD-10-CM

## 2021-11-30 PROCEDURE — 99214 OFFICE O/P EST MOD 30 MIN: CPT | Performed by: FAMILY MEDICINE

## 2021-11-30 RX ORDER — METFORMIN HCL 500 MG
TABLET, EXTENDED RELEASE 24 HR ORAL
Qty: 60 TABLET | Refills: 0 | Status: SHIPPED | OUTPATIENT
Start: 2021-11-30 | End: 2021-12-31

## 2021-11-30 NOTE — PATIENT INSTRUCTIONS
Start metformin 500mg pill XR once a day with the biggest meal, usually dinner for one week.  If this goes well and no side effects then increase to take 2 pill (1000mg) with dinner.    Then recheck in clinic or with phone recheck in 2-4 weeks. Just keep the appt with the diabetic educator.  If this is going well then we can increase it.  Sooner if metformin is not going well.    Check morning or 2 hours after eating. Check 2 times a day.

## 2021-11-30 NOTE — TELEPHONE ENCOUNTER
[facility]  Shira BERRY returning a call to Malathi regarding patient. Shira states Maddy has not been doing well in regards to her mental health recently and has not been compliant with her meds. Will route message to [place] care team. Please call  Shira at [phone]  Cassidy JOHNSON RN

## 2021-11-30 NOTE — PROGRESS NOTES
"  Assessment & Plan     Type 2 diabetes mellitus without complication, without long-term current use of insulin (H)  Not controlled well.  Add metformin titrate up and recheck in 2 weeks with Diabetic Ed and can titrate up again.  - metFORMIN (GLUCOPHAGE-XR) 500 MG 24 hr tablet; Take 1 tablet (500 mg) by mouth daily (with dinner) for 7 days, THEN 2 tablets (1,000 mg) daily (with dinner).       BMI:   Estimated body mass index is 42.53 kg/m  as calculated from the following:    Height as of 11/8/21: 1.651 m (5' 5\").    Weight as of this encounter: 115.9 kg (255 lb 9.6 oz).     Blood sugar testing frequency justification:  Uncontrolled diabetes  Patient Instructions   Start metformin 500mg pill XR once a day with the biggest meal, usually dinner for one week.  If this goes well and no side effects then increase to take 2 pill (1000mg) with dinner.    Then recheck in clinic or with phone recheck in 2-4 weeks. Just keep the appt with the diabetic educator.  If this is going well then we can increase it.  Sooner if metformin is not going well.    Check morning or 2 hours after eating. Check 2 times a day.      No follow-ups on file.    David Vivar MD  [facility]    Terry Castañeda is a 35 year old who presents for the following health issues     History of Present Illness       Diabetes:   She presents for follow up of diabetes.  She is checking home blood glucose four or more times daily. She checks blood glucose before and after meals.  Blood glucose is sometimes over 200 and never under 70. She is aware of hypoglycemia symptoms including weakness and lethargy. She is concerned about blood sugar frequently over 200. She is having numbness in feet, excessive thirst and weight gain. The patient has not had a diabetic eye exam in the last 12 months.         She eats 2-3 servings of fruits and vegetables daily.She consumes 0 sweetened beverage(s) daily.She exercises with enough effort to " increase her heart rate 20 to 29 minutes per day.  She exercises with enough effort to increase her heart rate 6 days per week.   She is taking medications regularly.       Diabetes Follow-up    How often are you checking your blood sugar? Three times daily  Blood sugar testing frequency justification:  Uncontrolled diabetes  What time of day are you checking your blood sugars (select all that apply)?  Before and after meals  Have you had any blood sugars above 200?  Yes. Most of the time they are high. Patient states when her numbers do get into a good range she is very hungry.  Patient states she thinks she needs insulin or needs something to help her blood sugars stay in range. Patient states she is worried ab=nd would like to get her blood sugars under control.  Have you had any blood sugars below 70?  No    What symptoms do you notice when your blood sugar is low?  None    What concerns do you have today about your diabetes? None and Blood sugar is often over 200     Do you have any of these symptoms? (Select all that apply)  Excessive thirst, very hungry, very fatigue when sugars are really high, not sleeping.    Have you had a diabetic eye exam in the last 12 months? No        BP Readings from Last 2 Encounters:   11/08/21 (!) 124/96   11/05/21 (!) 166/102     Hemoglobin A1C (%)   Date Value   11/08/2021 6.9 (H)   06/09/2013 4.7     LDL Cholesterol Calculated (mg/dL)   Date Value   07/14/2021 130 (H)         How many servings of fruits and vegetables do you eat daily?  2-3- more veggies then fruit.    On average, how many sweetened beverages do you drink each day (Examples: soda, juice, sweet tea, etc.  Do NOT count diet or artificially sweetened beverages)?   0-2    How many days per week do you exercise enough to make your heart beat faster? 3 or less    How many minutes a day do you exercise enough to make your heart beat faster? 30 - 60    How many days per week do you miss taking your medication?  0      Review of Systems   Constitutional, HEENT, cardiovascular, pulmonary, gi and gu systems are negative, except as otherwise noted.      Objective    /72   Pulse 88   Temp 97.1  F (36.2  C) (Tympanic)   Resp 20   Wt 115.9 kg (255 lb 9.6 oz)   LMP 10/25/2021 (Approximate)   SpO2 98%   BMI 42.53 kg/m    Body mass index is 42.53 kg/m .  Physical Exam   GENERAL: healthy, alert and no distress  PSYCH: mentation appears normal, affect normal/bright

## 2021-12-10 NOTE — PROGRESS NOTES
Maddy Ortiz is 36 year old  female who presents for a billable video visit today.    How would you like to obtain your AVS? MyChart  If dropped from the video visit, the video invitation should be resent by: Send to e-mail at: [email or web address]  Will anyone else be joining your video visit? No      Video Start Time: 3:15 pm- converted to telephone due to technical difficulties    Provider Notes:     [facility] Non Surgical Follow up Visit   Date of visit: 12/13/2021  Physician: LONDON Serna MD, MD  Primary Care is Shirley Freeman.  Maddy Ortiz   36 year old  female     Initial Weight: 202#  Initial BMI: 34.14  Today's Weight:   Wt Readings from Last 1 Encounters:   12/13/21 111.1 kg (245 lb)     Body mass index is 40.77 kg/m .           Assessment and Plan   Assessment: Maddy is a 36 year old year old female who presents for medical weight management.      Plan:    1. Morbid obesity (H)  We discussed healthy habits to assist with weight loss. She will work on planning meals ahead of time using the plate method for portion control and macronutrient proportions. She will try keeping a food journal. I gave her some youtube exercise video sites. We discussed medication that may assist with weight loss. She will just continue the metformin for now. We could consider adding a GLP1 agonist      2. Fatty liver  This may improve with healthy habits and weight loss.    3. Type 2 diabetes mellitus without complication, without long-term current use of insulin (H)  This may improve with healthy habits and weight loss.    Follow up next available with our dietician and in 3 months with myself           INTERIM HISTORY  Patient was diagnosed with diabetes since I saw her last (she has been lost to follow up for over 1 year). She has gained 43#. She is back on metformin and seems to be tolerating it.     DIETARY HISTORY  Meals Per Day: 3  Eating Protein First?: no  Food Diary: B:eggs and bagel L:sandwich  (oatwheat bread) with turkey and carrots D:potatoes, salad  Snacks Per Day: mid afternoon, salad  Typical Snack: potatoes, (whatever she is making for her daughter)  Fluid Intake: 48 oz per day  Portion Control: no  Calorie Containing Beverages: orange juice 12-20 oz per day  Typical Protein Food Choices: turkey in sandwich  Choosing Whole Grains: sometimes  Meals at Restaurant per week:not discussed    Positive Changes Since Last Visit: off track  Struggling With: drinking sugared beverages, high carbohydrate diet, inadequate protein    Knowledgeable in Reading Food Labels: no  Getting Adequate Protein: no  Sleeping 7-8 hours/day not discussed  Stress management not discussed    PHYSICAL ACTIVITY PATTERNS:  Some walking, struggling with the cold. ADLs    REVIEW OF SYSTEMS  GENERAL/CONSTITUTIONAL:  Fatigue: yes  HEENT:  Vision changes, glaucoma: no  CARDIOVASCULAR:  Chest Pain with Exertion: no  PULMONARY:  Dyspnea on exertion: yes  PSYCHIATRIC:  Moods: she states she is stable  ENDOCRINE:  Monitoring Blood Sugars: yes  Sugars Well Controlled: no  No personal or family history of medullary thyroid cancer no  :  Birth control: none       Patient Profile   Social History     Social History Narrative    One child    Education: some college        October 21, 2021    ENVIRONMENTAL HISTORY: [place] The home is heated with a electric furnace. They do not have central air conditioning, does have box air conditioner in the wall and has air purifier. The patient's bedroom is furnished with stuffed animals in bed, carpeting in bedroom and fabric window coverings. Pets inside the apartment includes 1 cat. There is history of cockroach or mice infestation. There are no smokers in the house.  The apartment does not have a basement.         Past Medical History   Past Medical History:   Diagnosis Date     Bipolar 1 disorder (H) 12/6/2012     Depressive disorder      Diabetes  "mellitus, type 2 (H) 12/13/2021     Paranoid type delusional disorder (H) 11/14/2012     Patient Active Problem List   Diagnosis     Animal dander allergy     CARDIOVASCULAR SCREENING; LDL GOAL LESS THAN 160     Psychosis (H)     Paranoid type delusional disorder (H)     Bipolar 1 disorder (H)     Insomnia     Depression with anxiety     Seasonal allergic rhinitis due to pollen     Allergic rhinitis due to mold     Allergic rhinitis due to animal dander     Allergic rhinitis due to dust mite     Encounter for IUD insertion     Schizoaffective disorder, bipolar type (H)     Gastroesophageal reflux disease without esophagitis     Paranoia (psychosis) (H)     Morbid obesity (H)     Schizoaffective disorder (H)     Umbilical hernia without obstruction and without gangrene     Fatty liver     Diabetes mellitus, type 2 (H)       Past Surgical History  She has a past surgical history that includes tonsillectomy & adenoidectomy and tonsillectomy & adenoidectomy.     Examination   Ht 1.651 m (5' 5\")   Wt 111.1 kg (245 lb)   BMI 40.77 kg/m    GENERAL: Healthy, alert and no distress  EYES: Eyes grossly normal to inspection.  No discharge or erythema, or obvious scleral/conjunctival abnormalities.  RESP: No audible wheeze, cough, or visible cyanosis.  No visible retractions or increased work of breathing.    SKIN: Visible skin clear. No significant rash, abnormal pigmentation or lesions.  NEURO: Cranial nerves grossly intact.  Mentation and speech appropriate for age.  PSYCH: Mentation appears normal, affect normal/bright, judgement and insight intact, normal speech and appearance well-groomed.       Counseling:   We reviewed the important post op bariatric recommendations:  -eating 3 meals daily  -eating protein first, getting >60gm protein daily  -eating slowly, chewing food well  -avoiding/limiting calorie containing beverages  -limiting starchy vegetables and carbohydrates, choosing wheat, not white with breads,   " crackers, pastas, marquita, bagels, tortillas, rice  -limiting restaurant or cafeteria eating to twice a week or less    We discussed the importance of restorative sleep and stress management in maintaining a healthy weight.  We discussed the National Weight Control Registry healthy weight maintenance strategies and ways to optimize metabolism.  We discussed the importance of physical activity including cardiovascular and strength training in maintaining a healthier weight.    Total time spent on the date of this encounter doing: chart review, review of test results, patient visit, physical exam, education, counseling, developing plan of care and documenting = 40 minutes.         LONDON Serna MD  [facility]               Video-Visit Details    Type of service:  Video Visit    Video End Time (time video stopped): 3:43 PM  Originating Location (pt. Location): Home    Distant Location (provider location):  [facility] [place]     Platform used for Video Visit: [employer]

## 2021-12-13 ENCOUNTER — VIRTUAL VISIT (OUTPATIENT)
Dept: SURGERY | Facility: CLINIC | Age: 36
End: 2021-12-13
Payer: MEDICARE

## 2021-12-13 ENCOUNTER — VIRTUAL VISIT (OUTPATIENT)
Dept: EDUCATION SERVICES | Facility: CLINIC | Age: 36
End: 2021-12-13
Payer: MEDICARE

## 2021-12-13 VITALS — WEIGHT: 245 LBS | BODY MASS INDEX: 40.82 KG/M2 | HEIGHT: 65 IN

## 2021-12-13 DIAGNOSIS — E11.9 TYPE 2 DIABETES MELLITUS WITHOUT COMPLICATION, WITHOUT LONG-TERM CURRENT USE OF INSULIN (H): ICD-10-CM

## 2021-12-13 DIAGNOSIS — K76.0 FATTY LIVER: ICD-10-CM

## 2021-12-13 DIAGNOSIS — E66.01 MORBID OBESITY (H): Primary | ICD-10-CM

## 2021-12-13 PROCEDURE — 99214 OFFICE O/P EST MOD 30 MIN: CPT | Mod: 95 | Performed by: FAMILY MEDICINE

## 2021-12-13 PROCEDURE — 98967 PH1 ASSMT&MGMT NQHP 11-20: CPT | Mod: 95 | Performed by: DIETITIAN, REGISTERED

## 2021-12-13 ASSESSMENT — MIFFLIN-ST. JEOR: SCORE: 1797.19

## 2021-12-13 NOTE — PATIENT INSTRUCTIONS
Eat Better ? Move More ? Live Well    Eat 3 nutrient-rich meals each day     Don t skip meals--it will cause you to overeat later in the day!     Eating fiber (vegetables/fruits/whole grains) and protein with meals helps you stay full longer     Choose foods with less than 10 grams of sugar and 5 grams of fat per serving to prevent excess calories and weight re-gain   Eat around the same times each day to develop a routine eating schedule    Avoid snacking unless physically hungry.   Planned snacks: 1-2 times per day and no more than 150 calories    Eat protein first    Protein helps with healing, maintaining adequate muscle mass, reducing hunger and optimizing nutritional status    Aim for 60-80 grams of protein per day   Fill up on Fiber    Fiber comes from plants--fruits, veggies, whole grains, nuts/seeds and beans    Fiber is low in calories, high in phytonutrients and helps you stay full longer    Aim for 25-35 grams per day by eating fiber with meals and snacks  Eat [id number]    Take 20-30 minutes to eat each meal by taking small bites, chewing foods to applesauce consistency or 20-30 times before you swallow    Eating foods too fast can delay satiety/fullness signals and increase overeating   ? Slow down your eating by using toddler utensils, putting your fork/spoon down between bites and not watching TV or emailing during meals!   Keep a Journal          Writing down what you eat, how you feel and when you are active helps you identify new changes to work on from week to week          Look for ways to cut 100 calories from your current diet 2-3 times per day  Drink 64 ounces of 0-Calorie drinks between meals    Water    Zero calorie Propel  or Vitamin Water      SoBe [employer]  Zero Calories    Crystal Light , Sugar-Free Brown-Aid , and other sugar-free lemonade or flavored lara    Keep Caffeine to less than 300mg per day ie: 3-6oz cups coffee     Work up to 45-60 minutes of physical activity most days of  the week    Helps with losing weight and prevent regaining those extra pounds!     Do a combo of cardio (walking/water exercises) and strength training (lifting weights/Vinyasa yoga)    Avoid Mindless Eating    Be present when you eat--take note of the smell, taste and quality of your food    Make a list of alternative activities you could do to prevent eating out of boredom/stress  ? Go for a walk, call a friend, chew gum, paint your nails, re-organize the garage, etc      LEAN PROTEIN SOURCES      Protein Source Portion Calories Grams of Protein                           Nonfat, plain Greek yogurt    (10 grams sugar or less) 3/4 cup (6 oz)  12-17   Light Yogurt (10 grams sugar or less) 3/4 cup (6 oz)  6-8   Protein Shake 1 shake 110-180 15-30   Skim/1% Milk or lactose-free milk 1 cup ( 8 oz)  8   Plain or light, flavored soymilk 1 cup  7-8   Plain or light, hemp milk 1 cup 110 6   Fat Free or 1% Cottage Cheese 1/2 cup 90 15   Part skim ricotta cheese 1/2 cup 100 14   Part skim or reduced fat cheese slices 1 ounce 65-80 8     Mozzarella String Cheese 1 80 8   Canned tuna, chicken, crab or salmon  (canned in water)  1/2 cup 100 15-20   White fish (broiled, grilled, baked) 3 ounces 100 21   [place]/Tuna (broiled, grilled, baked) 3 ounces 150-180 21   Shrimp, Scallops, Lobster, Crab 3 ounces 100 21   Pork loin, Pork Tenderloin 3 ounces 150 21   Boneless, skinless chicken /turkey breast                          (broiled, grilled, baked) 3 ounces 120 21   [place], [place], [place], and Venison 3 ounces 120 21   Lean cuts of red meat and pork (sirloin,   round, tenderloin, flank, ground 93%-96%) 3 ounces 170 21   Lean or Extra Lean Ground Turkey 1/2 cup 150 20   90-95% Lean [place] Burger 1 lashawn 140-180 21   Low-fat casserole with lean meat 3/4 cup 200 17   Luncheon Meats                                                        (turkey, lean ham, roast beef, chicken) 3 ounces 100 21   Egg (boiled, poached,  scrambled) 1 Egg 60 7   Egg Substitute 1/2 cup 70 10   Nuts (limit to 1 serving per day)  3 Tbsp. 150 7   Nut Potlatch (peanut, almond)  Limit to 1 serving or less daily 1 Tbsp. 90 4   Soy Burger (varies) 1  15   Garbanzo, Black, [place] Beans 1/2 cup 110 7   Refried Beans 1/2 cup 100 7   Kidney and Lima beans 1/2 cup 110 7   Tempeh 3 oz 175 18   Vegan crumbles 1/2 cup 100 14   Tofu 1/2 cup 110 14   Chili (beans and extra lean beef or turkey) 1 cup 200 23   Lentil Stew/Soup 1 cup 150 12   Black Bean Soup 1 cup 175 12       Here are some recommended [employer] sites for exercise videos:    Yoga-https://www.ByteShield.Txt4/user/yogawithadriene    Body strength activities, dance, high intensity interval training- https://www.ByteShield.Txt4/user/popsugartvfit    Strength training- https://www.ByteShield.Txt4/user/KozakSportsPerform    Walking- https://www.ByteShield.Txt4/user/walkathomemedia    Sit and Be Fit- https://www.ByteShield.Txt4/channel/UCLgvL3aGzMByecNYtMcyK_g    Low impact cardio- Priscilla Bateman- https://www.ByteShield.com/channel/DFjtKWrZqqvBtdmT8jyozslG    Cardio Dorcas Omalley- https://www.ByteShield.Txt4/channel/BEImIgrt2WFeHWHBU9qUvUBg    30 Min low impact cardio- https://www.Quidsiube.com/watch?v=gC_L9qAHVJ8    Body Project- https://www.Quidsiube.Txt4/channel/HUWgn8U46-KuQfQLbFgsH5RM

## 2021-12-13 NOTE — LETTER
12/13/2021         RE: Maddy Ortiz  [address]  [facility] [address]        Dear Colleague,    Thank you for referring your patient, Maddy Ortiz, to the [facility] [place]. Please see a copy of my visit note below.    Maddy Ortiz is 36 year old  female who presents for a billable video visit today.    How would you like to obtain your AVS? MyChart  If dropped from the video visit, the video invitation should be resent by: Send to e-mail at: [email or web address]  Will anyone else be joining your video visit? No  {If patient encounters technical issues they should call [phone]}    Video Start Time: 3:15 pm- converted to telephone due to technical difficulties    Provider Notes:     [facility] Non Surgical Follow up Visit   Date of visit: 12/13/2021  Physician: LONDON Serna MD, MD  Primary Care is Shirley Freeman.  Maddy Ortiz   36 year old  female     Initial Weight: 202#  Initial BMI: 34.14  Today's Weight:   Wt Readings from Last 1 Encounters:   12/13/21 111.1 kg (245 lb)     Body mass index is 40.77 kg/m .           Assessment and Plan   Assessment: Maddy is a 36 year old year old female who presents for medical weight management.      Plan:    1. Morbid obesity (H)  We discussed healthy habits to assist with weight loss. She will work on planning meals ahead of time using the plate method for portion control and macronutrient proportions. She will try keeping a food journal. I gave her some youtube exercise video sites. We discussed medication that may assist with weight loss. She will just continue the metformin for now. We could consider adding a GLP1 agonist      2. Fatty liver  This may improve with healthy habits and weight loss.    3. Type 2 diabetes mellitus without complication, without long-term current use of insulin (H)  This may improve with healthy habits and weight loss.    Follow up next available  with our dietician and in 3 months with myself           INTERIM HISTORY  Patient was diagnosed with diabetes since I saw her last (she has been lost to follow up for over 1 year). She has gained 43#. She is back on metformin and seems to be tolerating it.     DIETARY HISTORY  Meals Per Day: 3  Eating Protein First?: no  Food Diary: B:eggs and bagel L:sandwich (oatwheat bread) with turkey and carrots D:potatoes, salad  Snacks Per Day: mid afternoon, salad  Typical Snack: potatoes, (whatever she is making for her daughter)  Fluid Intake: 48 oz per day  Portion Control: no  Calorie Containing Beverages: orange juice 12-20 oz per day  Typical Protein Food Choices: turkey in sandwich  Choosing Whole Grains: sometimes  Meals at Restaurant per week:not discussed    Positive Changes Since Last Visit: off track  Struggling With: drinking sugared beverages, high carbohydrate diet, inadequate protein    Knowledgeable in Reading Food Labels: no  Getting Adequate Protein: no  Sleeping 7-8 hours/day not discussed  Stress management not discussed    PHYSICAL ACTIVITY PATTERNS:  Some walking, struggling with the cold. ADLs    REVIEW OF SYSTEMS  GENERAL/CONSTITUTIONAL:  Fatigue: yes  HEENT:  Vision changes, glaucoma: no  CARDIOVASCULAR:  Chest Pain with Exertion: no  PULMONARY:  Dyspnea on exertion: yes  PSYCHIATRIC:  Moods: she states she is stable  ENDOCRINE:  Monitoring Blood Sugars: yes  Sugars Well Controlled: no  No personal or family history of medullary thyroid cancer no  :  Birth control: none       Patient Profile   Social History     Social History Narrative    One child    Education: some college        October 21, 2021    ENVIRONMENTAL HISTORY: [place] The home is heated with a electric furnace. They do not have central air conditioning, does have box air conditioner in the wall and has air purifier. The patient's bedroom is furnished with stuffed animals in bed,  "carpeting in bedroom and fabric window coverings. Pets inside the apartment includes 1 cat. There is history of cockroach or mice infestation. There are no smokers in the house.  The apartment does not have a basement.         Past Medical History   Past Medical History:   Diagnosis Date     Bipolar 1 disorder (H) 12/6/2012     Depressive disorder      Diabetes mellitus, type 2 (H) 12/13/2021     Paranoid type delusional disorder (H) 11/14/2012     Patient Active Problem List   Diagnosis     Animal dander allergy     CARDIOVASCULAR SCREENING; LDL GOAL LESS THAN 160     Psychosis (H)     Paranoid type delusional disorder (H)     Bipolar 1 disorder (H)     Insomnia     Depression with anxiety     Seasonal allergic rhinitis due to pollen     Allergic rhinitis due to mold     Allergic rhinitis due to animal dander     Allergic rhinitis due to dust mite     Encounter for IUD insertion     Schizoaffective disorder, bipolar type (H)     Gastroesophageal reflux disease without esophagitis     Paranoia (psychosis) (H)     Morbid obesity (H)     Schizoaffective disorder (H)     Umbilical hernia without obstruction and without gangrene     Fatty liver     Diabetes mellitus, type 2 (H)       Past Surgical History  She has a past surgical history that includes tonsillectomy & adenoidectomy and tonsillectomy & adenoidectomy.     Examination   Ht 1.651 m (5' 5\")   Wt 111.1 kg (245 lb)   BMI 40.77 kg/m    GENERAL: Healthy, alert and no distress  EYES: Eyes grossly normal to inspection.  No discharge or erythema, or obvious scleral/conjunctival abnormalities.  RESP: No audible wheeze, cough, or visible cyanosis.  No visible retractions or increased work of breathing.    SKIN: Visible skin clear. No significant rash, abnormal pigmentation or lesions.  NEURO: Cranial nerves grossly intact.  Mentation and speech appropriate for age.  PSYCH: Mentation appears normal, affect normal/bright, judgement and insight intact, normal speech " and appearance well-groomed.       Counseling:   We reviewed the important post op bariatric recommendations:  -eating 3 meals daily  -eating protein first, getting >60gm protein daily  -eating slowly, chewing food well  -avoiding/limiting calorie containing beverages  -limiting starchy vegetables and carbohydrates, choosing wheat, not white with breads,   crackers, pastas, marquita, bagels, tortillas, rice  -limiting restaurant or cafeteria eating to twice a week or less    We discussed the importance of restorative sleep and stress management in maintaining a healthy weight.  We discussed the National Weight Control Registry healthy weight maintenance strategies and ways to optimize metabolism.  We discussed the importance of physical activity including cardiovascular and strength training in maintaining a healthier weight.    Total time spent on the date of this encounter doing: chart review, review of test results, patient visit, physical exam, education, counseling, developing plan of care and documenting = 40 minutes.         LONDON Serna MD  [facility]               Video-Visit Details    Type of service:  Video Visit    Video End Time (time video stopped): {video visit now:[id number]}  Originating Location (pt. Location): Home    Distant Location (provider location):  [facility] [place]     Platform used for Video Visit: FloWell        Again, thank you for allowing me to participate in the care of your patient.        Sincerely,        LONDON Serna MD

## 2021-12-13 NOTE — PROGRESS NOTES
Diabetes Follow-up    Subjective/Objective:  Type of Service: Telephone Visit  Originating Location (Patient Location): Home  Distant Location (Provider Location): Home  Mode of Communication:  Telephone  Telephone Visit Start Time: 207  Telephone Visit End Time (telephone visit stop time): 220    Taking diabetes medications?   yes:     Diabetes Medication(s)     Biguanides       metFORMIN (GLUCOPHAGE-XR) 500 MG 24 hr tablet    Take 1 tablet (500 mg) by mouth daily (with dinner) for 7 days, THEN 2 tablets (1,000 mg) daily (with dinner).        Medication reconciliation - removed Glipizide.           Lab Results   Component Value Date    A1C 6.9 11/08/2021    A1C 4.7 06/09/2013         Assessment/Plan/Response:  Made the switch back to metformin after last MD visit and is willing to give it another try.  Extended release is going better.  No issues with tolerating 1 tab.  Discussed beginning 2 tabs (1000mg) starting tomorrow.  Maddy would prefer to try one with breakfast and one with dinner.   Has not been checking blood sugars (only has 2 strips left and wanted to save incase she needed to check).  Went through a lot of strips when initially learning to use the meter and it's too soon for a refill.  Suspect that Medicare is limitng strips to 1/day.  Resent prescription for dispensing 100 as it was listed as 90 and that quantity cannot be dispensed and added refills.  Patient might be interested in getting an OTC meter cash pay for more test strips (but advised to ask first if the strips are running through medicare or [facility]).  Will follow up again in January.     Anna Hampton RD, LD, [facility]  Diabetes Education  Time spent: 13 minutes

## 2021-12-20 NOTE — TELEPHONE ENCOUNTER
Looks like this message was done'd by accident.  Patient had a in office visit for her DM. Nicci PONCE RN

## 2021-12-30 ENCOUNTER — VIRTUAL VISIT (OUTPATIENT)
Dept: SURGERY | Facility: CLINIC | Age: 36
End: 2021-12-30
Payer: MEDICARE

## 2021-12-30 DIAGNOSIS — E66.01 OBESITY, CLASS III, BMI 40-49.9 (MORBID OBESITY) (H): ICD-10-CM

## 2021-12-30 DIAGNOSIS — E11.9 TYPE 2 DIABETES MELLITUS WITHOUT COMPLICATION, WITHOUT LONG-TERM CURRENT USE OF INSULIN (H): Primary | ICD-10-CM

## 2021-12-30 DIAGNOSIS — Z71.3 NUTRITIONAL COUNSELING: ICD-10-CM

## 2021-12-30 PROCEDURE — 98967 PH1 ASSMT&MGMT NQHP 11-20: CPT | Performed by: DIETITIAN, REGISTERED

## 2021-12-30 NOTE — PROGRESS NOTES
Maddy Ortiz is a 36 year old who is being evaluated via a billable telephone visit.      What phone number would you like to be contacted at? [phone]  How would you like to obtain your AVS? [facility]  Weight Loss Follow-Up Diet Evaluation  Assessment:  Maddy is presenting today for a follow up weight management nutrition consultation. Pt has had an initial appointment with Dr. Serna  Weight loss medication: metformin.   Pt's weight is 0 lbs 0 oz- no weight recorded today    Changes and Difficulties 10/15/2018   I have made the following changes to my diet since my last visit: soup no pizza   With regards to my diet, I am still struggling with: none   I have made the following changes to my activity/exercise since my last visit: walking   With regards to my activity/exercise, I am still struggling with: tiredness     BMI: 40.77  Ideal body weight: 57 kg (125 lb 10.6 oz)  Adjusted ideal body weight: 78.7 kg (173 lb 6.4 oz)    Estimated RMR (Wells-St Jeor equation):   1800 kcals x 1.2 (sedentary) = 216- kcals (for weight maintenance)  Recommended Protein Intake: 60-80 grams of protein/day  Patient Active Problem List:  Patient Active Problem List   Diagnosis     Animal dander allergy     CARDIOVASCULAR SCREENING; LDL GOAL LESS THAN 160     Psychosis (H)     Paranoid type delusional disorder (H)     Bipolar 1 disorder (H)     Insomnia     Depression with anxiety     Seasonal allergic rhinitis due to pollen     Allergic rhinitis due to mold     Allergic rhinitis due to animal dander     Allergic rhinitis due to dust mite     Encounter for IUD insertion     Schizoaffective disorder, bipolar type (H)     Gastroesophageal reflux disease without esophagitis     Paranoia (psychosis) (H)     Morbid obesity (H)     Schizoaffective disorder (H)     Umbilical hernia without obstruction and without gangrene     Fatty liver     Diabetes mellitus, type 2 (H)     Diabetes: new diagnosis- states she hasn't been  checking blood glucose- limited access to resources- unable to get prescription for testing strips    Progress on goals from last visit: feels like her mental health is affecting food choices- states she has a bit of apathy  +states she has been eating sweets over the holidays    Dietary Recall:  Breakfast: small piece of cake  Lunch: peanut butter sandwich  Dinner:pot roast with potatoes and carrots  Exercise: 10 min from youtube video yesterday- did not complete the video    Nutrition Diagnosis:    Overweight/Obesity (NC 3.3) related to overeating and poor lifestyle habits as evidenced by patient's report of inconsistent meals, large portions, frequent snacking of sweets, lack of activity and BMI 40.77  Not ready for diet/lifestyle change (NB 1.3) related to unsupported beliefs/attitudes about food, nutrition and nutrition-related topics as evidenced by patient's subjective statements, inability to meet goals previously set         Intervention:  1. Food and/or nutrient delivery: encouraged three meals per day, protein first, increasing veggies and limiting carbs to 1 cup or less per meal  2. Nutrition education: educated on foods that have an impact on blood glucose- advised to eat protein with every meal  3. Nutrition counseling: motivational interviewing and support for continued success    Monitoring/Evaluation:    Goals:  1. Reduce sugar- limit to 1 dessert per day  2. Exercise daily- small movement  3. Plan veggies for lunch and dinner  4. Track food and send mychart    Patient to follow up in 3 month(s) with bariatrician and 2 month(s) with RD        Phone call duration: 20 minutes    KARLA FAULKNER RD

## 2021-12-30 NOTE — LETTER
12/30/2021         RE: Maddy Ortiz  [address]  [facility] [address]        Dear Colleague,    Thank you for referring your patient, Maddy Ortiz, to the [facility] [place]. Please see a copy of my visit note below.    Maddy Ortiz is a 36 year old who is being evaluated via a billable telephone visit.      What phone number would you like to be contacted at? [phone]  How would you like to obtain your AVS? [facility]  Weight Loss Follow-Up Diet Evaluation  Assessment:  Maddy is presenting today for a follow up weight management nutrition consultation. Pt has had an initial appointment with Dr. Serna  Weight loss medication: metformin.   Pt's weight is 0 lbs 0 oz- no weight recorded today    Changes and Difficulties 10/15/2018   I have made the following changes to my diet since my last visit: soup no pizza   With regards to my diet, I am still struggling with: none   I have made the following changes to my activity/exercise since my last visit: walking   With regards to my activity/exercise, I am still struggling with: tiredness     BMI: 40.77  Ideal body weight: 57 kg (125 lb 10.6 oz)  Adjusted ideal body weight: 78.7 kg (173 lb 6.4 oz)    Estimated RMR ([place]-St Jeor equation):   1800 kcals x 1.2 (sedentary) = 216- kcals (for weight maintenance)  Recommended Protein Intake: 60-80 grams of protein/day  Patient Active Problem List:  Patient Active Problem List   Diagnosis     Animal dander allergy     CARDIOVASCULAR SCREENING; LDL GOAL LESS THAN 160     Psychosis (H)     Paranoid type delusional disorder (H)     Bipolar 1 disorder (H)     Insomnia     Depression with anxiety     Seasonal allergic rhinitis due to pollen     Allergic rhinitis due to mold     Allergic rhinitis due to animal dander     Allergic rhinitis due to dust mite     Encounter for IUD insertion     Schizoaffective disorder, bipolar type (H)      Gastroesophageal reflux disease without esophagitis     Paranoia (psychosis) (H)     Morbid obesity (H)     Schizoaffective disorder (H)     Umbilical hernia without obstruction and without gangrene     Fatty liver     Diabetes mellitus, type 2 (H)     Diabetes: new diagnosis- states she hasn't been checking blood glucose- limited access to resources- unable to get prescription for testing strips    Progress on goals from last visit: feels like her mental health is affecting food choices- states she has a bit of apathy  +states she has been eating sweets over the holidays    Dietary Recall:  Breakfast: small piece of cake  Lunch: peanut butter sandwich  Dinner:pot roast with potatoes and carrots  Exercise: 10 min from youtube video yesterday- did not complete the video    Nutrition Diagnosis:    Overweight/Obesity (NC 3.3) related to overeating and poor lifestyle habits as evidenced by patient's report of inconsistent meals, large portions, frequent snacking of sweets, lack of activity and BMI 40.77  Not ready for diet/lifestyle change (NB 1.3) related to unsupported beliefs/attitudes about food, nutrition and nutrition-related topics as evidenced by patient's subjective statements, inability to meet goals previously set         Intervention:  1. Food and/or nutrient delivery: encouraged three meals per day, protein first, increasing veggies and limiting carbs to 1 cup or less per meal  2. Nutrition education: educated on foods that have an impact on blood glucose- advised to eat protein with every meal  3. Nutrition counseling: motivational interviewing and support for continued success    Monitoring/Evaluation:    Goals:  1. Reduce sugar- limit to 1 dessert per day  2. Exercise daily- small movement  3. Plan veggies for lunch and dinner  4. Track food and send mychart    Patient to follow up in 3 month(s) with bariatrician and 2 month(s) with RD        Phone call duration: 20 minutes    KARLA FAULKNER  ELVIA        Again, thank you for allowing me to participate in the care of your patient.        Sincerely,        KARLA FAULKNER RD

## 2022-01-08 ENCOUNTER — TELEPHONE (OUTPATIENT)
Dept: BEHAVIORAL HEALTH | Facility: CLINIC | Age: 37
End: 2022-01-08

## 2022-01-08 ENCOUNTER — HOSPITAL ENCOUNTER (INPATIENT)
Facility: CLINIC | Age: 37
LOS: 3 days | Discharge: HOME | DRG: 178 | End: 2022-01-11
Attending: EMERGENCY MEDICINE | Admitting: HOSPITALIST
Payer: MEDICARE

## 2022-01-08 ENCOUNTER — APPOINTMENT (OUTPATIENT)
Dept: ULTRASOUND IMAGING | Facility: CLINIC | Age: 37
DRG: 178 | End: 2022-01-08
Attending: EMERGENCY MEDICINE
Payer: MEDICARE

## 2022-01-08 DIAGNOSIS — F25.8 OTHER SCHIZOAFFECTIVE DISORDERS (H): ICD-10-CM

## 2022-01-08 DIAGNOSIS — R46.89 DISORGANIZED BEHAVIOR: ICD-10-CM

## 2022-01-08 DIAGNOSIS — F25.0 SCHIZOAFFECTIVE DISORDER, BIPOLAR TYPE (H): ICD-10-CM

## 2022-01-08 DIAGNOSIS — E05.90 HYPERTHYROIDISM: Primary | ICD-10-CM

## 2022-01-08 DIAGNOSIS — F31.9 BIPOLAR 1 DISORDER (H): ICD-10-CM

## 2022-01-08 DIAGNOSIS — U07.1 INFECTION DUE TO 2019 NOVEL CORONAVIRUS: ICD-10-CM

## 2022-01-08 DIAGNOSIS — R79.89 ELEVATED LFTS: ICD-10-CM

## 2022-01-08 DIAGNOSIS — E11.9 TYPE 2 DIABETES MELLITUS WITHOUT COMPLICATION, WITHOUT LONG-TERM CURRENT USE OF INSULIN (H): ICD-10-CM

## 2022-01-08 DIAGNOSIS — U07.1 LAB TEST POSITIVE FOR DETECTION OF COVID-19 VIRUS: ICD-10-CM

## 2022-01-08 LAB
ALBUMIN SERPL-MCNC: 3.6 G/DL (ref 3.4–5)
ALP SERPL-CCNC: 101 U/L (ref 40–150)
ALT SERPL W P-5'-P-CCNC: 121 U/L (ref 0–50)
ANION GAP SERPL CALCULATED.3IONS-SCNC: 8 MMOL/L (ref 3–14)
AST SERPL W P-5'-P-CCNC: 118 U/L (ref 0–45)
BASOPHILS # BLD AUTO: 0.1 10E3/UL (ref 0–0.2)
BASOPHILS NFR BLD AUTO: 1 %
BILIRUB SERPL-MCNC: 0.5 MG/DL (ref 0.2–1.3)
BUN SERPL-MCNC: 13 MG/DL (ref 7–30)
CALCIUM SERPL-MCNC: 9.5 MG/DL (ref 8.5–10.1)
CHLORIDE BLD-SCNC: 102 MMOL/L (ref 94–109)
CO2 SERPL-SCNC: 26 MMOL/L (ref 20–32)
CREAT SERPL-MCNC: 0.67 MG/DL (ref 0.52–1.04)
EOSINOPHIL # BLD AUTO: 0.3 10E3/UL (ref 0–0.7)
EOSINOPHIL NFR BLD AUTO: 2 %
ERYTHROCYTE [DISTWIDTH] IN BLOOD BY AUTOMATED COUNT: 12.3 % (ref 10–15)
ETHANOL SERPL-MCNC: <0.01 G/DL
GFR SERPL CREATININE-BSD FRML MDRD: >90 ML/MIN/1.73M2
GLUCOSE BLD-MCNC: 248 MG/DL (ref 70–99)
GLUCOSE BLDC GLUCOMTR-MCNC: 247 MG/DL (ref 70–99)
GLUCOSE BLDC GLUCOMTR-MCNC: 300 MG/DL (ref 70–99)
HBA1C MFR BLD: 8.4 % (ref 0–5.6)
HCG SERPL QL: NEGATIVE
HCT VFR BLD AUTO: 43.4 % (ref 35–47)
HGB BLD-MCNC: 14.5 G/DL (ref 11.7–15.7)
IMM GRANULOCYTES # BLD: 0.1 10E3/UL
IMM GRANULOCYTES NFR BLD: 1 %
LITHIUM SERPL-SCNC: <0.2 MMOL/L
LYMPHOCYTES # BLD AUTO: 2.3 10E3/UL (ref 0.8–5.3)
LYMPHOCYTES NFR BLD AUTO: 20 %
MCH RBC QN AUTO: 29.4 PG (ref 26.5–33)
MCHC RBC AUTO-ENTMCNC: 33.4 G/DL (ref 31.5–36.5)
MCV RBC AUTO: 88 FL (ref 78–100)
MONOCYTES # BLD AUTO: 0.8 10E3/UL (ref 0–1.3)
MONOCYTES NFR BLD AUTO: 7 %
NEUTROPHILS # BLD AUTO: 7.9 10E3/UL (ref 1.6–8.3)
NEUTROPHILS NFR BLD AUTO: 69 %
NRBC # BLD AUTO: 0 10E3/UL
NRBC BLD AUTO-RTO: 0 /100
PLATELET # BLD AUTO: 283 10E3/UL (ref 150–450)
POTASSIUM BLD-SCNC: 3.6 MMOL/L (ref 3.4–5.3)
PROT SERPL-MCNC: 7.8 G/DL (ref 6.8–8.8)
RBC # BLD AUTO: 4.93 10E6/UL (ref 3.8–5.2)
SARS-COV-2 RNA RESP QL NAA+PROBE: POSITIVE
SODIUM SERPL-SCNC: 136 MMOL/L (ref 133–144)
WBC # BLD AUTO: 11.4 10E3/UL (ref 4–11)

## 2022-01-08 PROCEDURE — 87340 HEPATITIS B SURFACE AG IA: CPT | Performed by: HOSPITALIST

## 2022-01-08 PROCEDURE — 120N000001 HC R&B MED SURG/OB

## 2022-01-08 PROCEDURE — 83036 HEMOGLOBIN GLYCOSYLATED A1C: CPT | Performed by: HOSPITALIST

## 2022-01-08 PROCEDURE — 99285 EMERGENCY DEPT VISIT HI MDM: CPT | Mod: 25 | Performed by: EMERGENCY MEDICINE

## 2022-01-08 PROCEDURE — 80074 ACUTE HEPATITIS PANEL: CPT | Performed by: HOSPITALIST

## 2022-01-08 PROCEDURE — 80178 ASSAY OF LITHIUM: CPT | Performed by: EMERGENCY MEDICINE

## 2022-01-08 PROCEDURE — 258N000003 HC RX IP 258 OP 636: Performed by: EMERGENCY MEDICINE

## 2022-01-08 PROCEDURE — 99207 PR CDG-MDM COMPONENT: MEETS MODERATE - UP CODED: CPT | Performed by: HOSPITALIST

## 2022-01-08 PROCEDURE — 80053 COMPREHEN METABOLIC PANEL: CPT | Performed by: EMERGENCY MEDICINE

## 2022-01-08 PROCEDURE — 76705 ECHO EXAM OF ABDOMEN: CPT

## 2022-01-08 PROCEDURE — 96360 HYDRATION IV INFUSION INIT: CPT | Performed by: EMERGENCY MEDICINE

## 2022-01-08 PROCEDURE — 86705 HEP B CORE ANTIBODY IGM: CPT | Performed by: HOSPITALIST

## 2022-01-08 PROCEDURE — 80307 DRUG TEST PRSMV CHEM ANLYZR: CPT | Performed by: EMERGENCY MEDICINE

## 2022-01-08 PROCEDURE — 90791 PSYCH DIAGNOSTIC EVALUATION: CPT

## 2022-01-08 PROCEDURE — 36415 COLL VENOUS BLD VENIPUNCTURE: CPT | Performed by: EMERGENCY MEDICINE

## 2022-01-08 PROCEDURE — 87635 SARS-COV-2 COVID-19 AMP PRB: CPT | Performed by: EMERGENCY MEDICINE

## 2022-01-08 PROCEDURE — 99223 1ST HOSP IP/OBS HIGH 75: CPT | Mod: AI | Performed by: HOSPITALIST

## 2022-01-08 PROCEDURE — 85025 COMPLETE CBC W/AUTO DIFF WBC: CPT | Performed by: EMERGENCY MEDICINE

## 2022-01-08 PROCEDURE — C9803 HOPD COVID-19 SPEC COLLECT: HCPCS | Performed by: EMERGENCY MEDICINE

## 2022-01-08 PROCEDURE — 86140 C-REACTIVE PROTEIN: CPT | Performed by: INTERNAL MEDICINE

## 2022-01-08 PROCEDURE — 82077 ASSAY SPEC XCP UR&BREATH IA: CPT | Performed by: EMERGENCY MEDICINE

## 2022-01-08 PROCEDURE — 84703 CHORIONIC GONADOTROPIN ASSAY: CPT | Performed by: EMERGENCY MEDICINE

## 2022-01-08 PROCEDURE — 99285 EMERGENCY DEPT VISIT HI MDM: CPT | Performed by: EMERGENCY MEDICINE

## 2022-01-08 PROCEDURE — 81001 URINALYSIS AUTO W/SCOPE: CPT | Performed by: EMERGENCY MEDICINE

## 2022-01-08 RX ORDER — NICOTINE POLACRILEX 4 MG
15-30 LOZENGE BUCCAL
Status: DISCONTINUED | OUTPATIENT
Start: 2022-01-08 | End: 2022-01-11 | Stop reason: HOSPADM

## 2022-01-08 RX ORDER — ALBUTEROL SULFATE 90 UG/1
2 AEROSOL, METERED RESPIRATORY (INHALATION) EVERY 4 HOURS PRN
Status: DISCONTINUED | OUTPATIENT
Start: 2022-01-08 | End: 2022-01-11 | Stop reason: HOSPADM

## 2022-01-08 RX ORDER — LEVOTHYROXINE SODIUM 25 UG/1
25 TABLET ORAL
Status: DISCONTINUED | OUTPATIENT
Start: 2022-01-09 | End: 2022-01-11 | Stop reason: HOSPADM

## 2022-01-08 RX ORDER — HYDROXYZINE HYDROCHLORIDE 10 MG/1
10 TABLET, FILM COATED ORAL DAILY PRN
Status: DISCONTINUED | OUTPATIENT
Start: 2022-01-08 | End: 2022-01-11 | Stop reason: HOSPADM

## 2022-01-08 RX ORDER — METFORMIN HCL 500 MG
1000 TABLET, EXTENDED RELEASE 24 HR ORAL
Status: DISCONTINUED | OUTPATIENT
Start: 2022-01-08 | End: 2022-01-11 | Stop reason: HOSPADM

## 2022-01-08 RX ORDER — DEXTROSE MONOHYDRATE 25 G/50ML
25-50 INJECTION, SOLUTION INTRAVENOUS
Status: DISCONTINUED | OUTPATIENT
Start: 2022-01-08 | End: 2022-01-11 | Stop reason: HOSPADM

## 2022-01-08 RX ORDER — BENZTROPINE MESYLATE 0.5 MG/1
0.5 TABLET ORAL DAILY PRN
Status: DISCONTINUED | OUTPATIENT
Start: 2022-01-08 | End: 2022-01-11 | Stop reason: HOSPADM

## 2022-01-08 RX ADMIN — SODIUM CHLORIDE 1000 ML: 9 INJECTION, SOLUTION INTRAVENOUS at 19:04

## 2022-01-08 ASSESSMENT — ACTIVITIES OF DAILY LIVING (ADL)
ADLS_ACUITY_SCORE: 5

## 2022-01-08 ASSESSMENT — ENCOUNTER SYMPTOMS: SORE THROAT: 1

## 2022-01-08 ASSESSMENT — MIFFLIN-ST. JEOR: SCORE: 1831.21
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
## 2022-01-09 LAB
CRP SERPL-MCNC: 15 MG/L (ref 0–8)
GLUCOSE BLDC GLUCOMTR-MCNC: 186 MG/DL (ref 70–99)
GLUCOSE BLDC GLUCOMTR-MCNC: 245 MG/DL (ref 70–99)
GLUCOSE BLDC GLUCOMTR-MCNC: 263 MG/DL (ref 70–99)
GLUCOSE BLDC GLUCOMTR-MCNC: 281 MG/DL (ref 70–99)
GLUCOSE BLDC GLUCOMTR-MCNC: 285 MG/DL (ref 70–99)

## 2022-01-09 PROCEDURE — 250N000011 HC RX IP 250 OP 636: Performed by: HOSPITALIST

## 2022-01-09 PROCEDURE — 120N000001 HC R&B MED SURG/OB

## 2022-01-09 PROCEDURE — 99232 SBSQ HOSP IP/OBS MODERATE 35: CPT | Performed by: INTERNAL MEDICINE

## 2022-01-09 PROCEDURE — 96372 THER/PROPH/DIAG INJ SC/IM: CPT

## 2022-01-09 PROCEDURE — 250N000013 HC RX MED GY IP 250 OP 250 PS 637: Performed by: HOSPITALIST

## 2022-01-09 PROCEDURE — 250N000012 HC RX MED GY IP 250 OP 636 PS 637: Performed by: HOSPITALIST

## 2022-01-09 RX ADMIN — LEVOTHYROXINE SODIUM 25 MCG: 25 TABLET ORAL at 08:25

## 2022-01-09 RX ADMIN — INSULIN ASPART 3 UNITS: 100 INJECTION, SOLUTION INTRAVENOUS; SUBCUTANEOUS at 00:25

## 2022-01-09 RX ADMIN — INSULIN ASPART 3 UNITS: 100 INJECTION, SOLUTION INTRAVENOUS; SUBCUTANEOUS at 12:30

## 2022-01-09 RX ADMIN — LITHIUM CARBONATE 1200 MG: 300 TABLET, EXTENDED RELEASE ORAL at 22:15

## 2022-01-09 RX ADMIN — ENOXAPARIN SODIUM 40 MG: 40 INJECTION SUBCUTANEOUS at 00:24

## 2022-01-09 RX ADMIN — CARIPRAZINE 6 MG: 1.5 CAPSULE, GELATIN COATED ORAL at 22:12

## 2022-01-09 RX ADMIN — LITHIUM CARBONATE 1200 MG: 300 TABLET, EXTENDED RELEASE ORAL at 00:25

## 2022-01-09 RX ADMIN — CARIPRAZINE 6 MG: 1.5 CAPSULE, GELATIN COATED ORAL at 00:38

## 2022-01-09 RX ADMIN — METFORMIN HYDROCHLORIDE 1000 MG: 500 TABLET, EXTENDED RELEASE ORAL at 20:45

## 2022-01-09 RX ADMIN — INSULIN ASPART 3 UNITS: 100 INJECTION, SOLUTION INTRAVENOUS; SUBCUTANEOUS at 08:25

## 2022-01-09 RX ADMIN — INSULIN ASPART 2 UNITS: 100 INJECTION, SOLUTION INTRAVENOUS; SUBCUTANEOUS at 22:10

## 2022-01-09 RX ADMIN — ENOXAPARIN SODIUM 40 MG: 40 INJECTION SUBCUTANEOUS at 22:13

## 2022-01-09 RX ADMIN — INSULIN ASPART 2 UNITS: 100 INJECTION, SOLUTION INTRAVENOUS; SUBCUTANEOUS at 19:13

## 2022-01-09 ASSESSMENT — ACTIVITIES OF DAILY LIVING (ADL)
ADLS_ACUITY_SCORE: 5

## 2022-01-09 NOTE — PHARMACY-ADMISSION MEDICATION HISTORY
Admission Medication History status for the 1/8/2022 admission is complete.  See EPIC admission navigator for Prior to Admission medications.    Medication history sources:  patient and  Surescripts    Medication history source reliability: Moderate    Medication adherence:  Poor    Changes made to PTA medication list (reason)  Added: n/a  Deleted: n/a  Changed: n/a    Additional medication history information (including reliability of information, actions taken by pharmacist): None    Time spent in this activity: 20 minutes     Medication history completed by: Soledad Conde.D    Prior to Admission medications    Medication Sig Last Dose Taking? Auth Provider   albuterol (PROAIR HFA/PROVENTIL HFA/VENTOLIN HFA) 108 (90 Base) MCG/ACT inhaler Inhale 2 puffs into the lungs every 4 hours as needed for wheezing or shortness of breath / dyspnea More than a month at Unknown time Yes Rudy Shin MD   benztropine (COGENTIN) 0.5 MG tablet Take 0.5 mg by mouth daily as needed  More than a month at Unknown time Yes Reported, Patient   hydrOXYzine (ATARAX) 10 MG tablet Take 10 mg by mouth daily as needed for itching or anxiety As needed 1/8/2022 at Unknown time Yes Reported, Patient   levonorgestrel-ethinyl estradiol (AVIANE) 0.1-20 MG-MCG tablet Take 1 tablet by mouth daily More than a month at Unknown time Yes Shirley Freeman APRN CNP   lithium ER (LITHOBID) 300 MG CR tablet Take 4 tablets (1,200 mg) by mouth At Bedtime Past Week at Unknown time Yes Marian Plascencia MD   Melatonin 10 MG TABS tablet Take 10 mg by mouth nightly as needed for sleep  More than a month at Unknown time Yes Reported, Patient   metFORMIN (GLUCOPHAGE-XR) 500 MG 24 hr tablet Take 2 tablets (1,000 mg) by mouth daily (with dinner) Past Week at Unknown time Yes Shirley Freeman APRN CNP   terbinafine (LAMISIL AT) 1 % external cream Apply topically 2 times daily More than a month at Unknown time Yes Rachid Garza, CHELY   VRAYLAR 6 MG  CAPS capsule Take 6 mg by mouth At Bedtime  Past Week at Unknown time Yes Reported, Patient   levothyroxine (SYNTHROID/LEVOTHROID) 25 MCG tablet Take 1 tablet by mouth every morning (before breakfast)   Patient not taking: Reported on 12/13/2021   Reported, Patient
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
## 2022-01-10 ENCOUNTER — TELEPHONE (OUTPATIENT)
Dept: BEHAVIORAL HEALTH | Facility: CLINIC | Age: 37
End: 2022-01-10
Payer: MEDICARE

## 2022-01-10 LAB
ALBUMIN SERPL-MCNC: 3.3 G/DL (ref 3.4–5)
ALBUMIN UR-MCNC: 20 MG/DL
ALP SERPL-CCNC: 91 U/L (ref 40–150)
ALT SERPL W P-5'-P-CCNC: 108 U/L (ref 0–50)
AMPHETAMINES UR QL SCN: NORMAL
ANION GAP SERPL CALCULATED.3IONS-SCNC: 6 MMOL/L (ref 3–14)
APPEARANCE UR: CLEAR
AST SERPL W P-5'-P-CCNC: 94 U/L (ref 0–45)
BACTERIA #/AREA URNS HPF: ABNORMAL /HPF
BARBITURATES UR QL: NORMAL
BENZODIAZ UR QL: NORMAL
BILIRUB SERPL-MCNC: 0.4 MG/DL (ref 0.2–1.3)
BILIRUB UR QL STRIP: NEGATIVE
BUN SERPL-MCNC: 16 MG/DL (ref 7–30)
CALCIUM SERPL-MCNC: 9.6 MG/DL (ref 8.5–10.1)
CANNABINOIDS UR QL SCN: NORMAL
CHLORIDE BLD-SCNC: 104 MMOL/L (ref 94–109)
CO2 SERPL-SCNC: 24 MMOL/L (ref 20–32)
COCAINE UR QL: NORMAL
COLOR UR AUTO: YELLOW
CREAT SERPL-MCNC: 0.69 MG/DL (ref 0.52–1.04)
CRP SERPL-MCNC: 10 MG/L (ref 0–8)
ERYTHROCYTE [DISTWIDTH] IN BLOOD BY AUTOMATED COUNT: 12.5 % (ref 10–15)
GFR SERPL CREATININE-BSD FRML MDRD: >90 ML/MIN/1.73M2
GLUCOSE BLD-MCNC: 289 MG/DL (ref 70–99)
GLUCOSE BLDC GLUCOMTR-MCNC: 177 MG/DL (ref 70–99)
GLUCOSE BLDC GLUCOMTR-MCNC: 184 MG/DL (ref 70–99)
GLUCOSE BLDC GLUCOMTR-MCNC: 245 MG/DL (ref 70–99)
GLUCOSE BLDC GLUCOMTR-MCNC: 275 MG/DL (ref 70–99)
GLUCOSE BLDC GLUCOMTR-MCNC: 316 MG/DL (ref 70–99)
GLUCOSE UR STRIP-MCNC: NEGATIVE MG/DL
HAV IGM SERPL QL IA: NONREACTIVE
HBV CORE IGM SERPL QL IA: NONREACTIVE
HBV SURFACE AG SERPL QL IA: NONREACTIVE
HCT VFR BLD AUTO: 43.5 % (ref 35–47)
HCV AB SERPL QL IA: NONREACTIVE
HGB BLD-MCNC: 14.1 G/DL (ref 11.7–15.7)
HGB UR QL STRIP: NEGATIVE
KETONES UR STRIP-MCNC: NEGATIVE MG/DL
LEUKOCYTE ESTERASE UR QL STRIP: ABNORMAL
MCH RBC QN AUTO: 29 PG (ref 26.5–33)
MCHC RBC AUTO-ENTMCNC: 32.4 G/DL (ref 31.5–36.5)
MCV RBC AUTO: 89 FL (ref 78–100)
MUCOUS THREADS #/AREA URNS LPF: PRESENT /LPF
NITRATE UR QL: NEGATIVE
OPIATES UR QL SCN: NORMAL
PH UR STRIP: 6.5 [PH] (ref 5–7)
PLATELET # BLD AUTO: 293 10E3/UL (ref 150–450)
POTASSIUM BLD-SCNC: 4.3 MMOL/L (ref 3.4–5.3)
PROT SERPL-MCNC: 7.3 G/DL (ref 6.8–8.8)
RBC # BLD AUTO: 4.87 10E6/UL (ref 3.8–5.2)
RBC URINE: 2 /HPF
SODIUM SERPL-SCNC: 134 MMOL/L (ref 133–144)
SP GR UR STRIP: 1.03 (ref 1–1.03)
SQUAMOUS EPITHELIAL: 8 /HPF
UROBILINOGEN UR STRIP-MCNC: NORMAL MG/DL
WBC # BLD AUTO: 14.9 10E3/UL (ref 4–11)
WBC URINE: 5 /HPF

## 2022-01-10 PROCEDURE — 80053 COMPREHEN METABOLIC PANEL: CPT | Performed by: INTERNAL MEDICINE

## 2022-01-10 PROCEDURE — 250N000012 HC RX MED GY IP 250 OP 636 PS 637: Performed by: INTERNAL MEDICINE

## 2022-01-10 PROCEDURE — 120N000002 HC R&B MED SURG/OB UMMC

## 2022-01-10 PROCEDURE — 85027 COMPLETE CBC AUTOMATED: CPT | Performed by: INTERNAL MEDICINE

## 2022-01-10 PROCEDURE — 99232 SBSQ HOSP IP/OBS MODERATE 35: CPT | Performed by: INTERNAL MEDICINE

## 2022-01-10 PROCEDURE — 250N000012 HC RX MED GY IP 250 OP 636 PS 637: Performed by: HOSPITALIST

## 2022-01-10 PROCEDURE — 250N000013 HC RX MED GY IP 250 OP 250 PS 637: Performed by: HOSPITALIST

## 2022-01-10 PROCEDURE — 86140 C-REACTIVE PROTEIN: CPT | Performed by: INTERNAL MEDICINE

## 2022-01-10 PROCEDURE — 250N000011 HC RX IP 250 OP 636: Performed by: HOSPITALIST

## 2022-01-10 PROCEDURE — 36415 COLL VENOUS BLD VENIPUNCTURE: CPT | Performed by: INTERNAL MEDICINE

## 2022-01-10 RX ADMIN — INSULIN ASPART 4 UNITS: 100 INJECTION, SOLUTION INTRAVENOUS; SUBCUTANEOUS at 19:21

## 2022-01-10 RX ADMIN — INSULIN ASPART 3 UNITS: 100 INJECTION, SOLUTION INTRAVENOUS; SUBCUTANEOUS at 08:43

## 2022-01-10 RX ADMIN — METFORMIN HYDROCHLORIDE 1000 MG: 500 TABLET, EXTENDED RELEASE ORAL at 18:45

## 2022-01-10 RX ADMIN — LITHIUM CARBONATE 1200 MG: 300 TABLET, EXTENDED RELEASE ORAL at 22:55

## 2022-01-10 RX ADMIN — INSULIN GLARGINE 10 UNITS: 100 INJECTION, SOLUTION SUBCUTANEOUS at 22:58

## 2022-01-10 RX ADMIN — ENOXAPARIN SODIUM 40 MG: 40 INJECTION SUBCUTANEOUS at 22:55

## 2022-01-10 RX ADMIN — CARIPRAZINE 6 MG: 1.5 CAPSULE, GELATIN COATED ORAL at 22:56

## 2022-01-10 RX ADMIN — LEVOTHYROXINE SODIUM 25 MCG: 25 TABLET ORAL at 08:42

## 2022-01-10 ASSESSMENT — ACTIVITIES OF DAILY LIVING (ADL)
ADLS_ACUITY_SCORE: 5
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
## 2022-01-10 NOTE — ED NOTES
Per Psychiatry recommendation Lithium dose should be 600 mg then titrate by 300 mg every 2-3 days. ER doctor Mat was informed. She did not change the dose since did not write the note. Charge RN tried to call the doctor who wrote the recommendation but he was unable to reach the doctor. His call was re route to Intake.

## 2022-01-10 NOTE — ED NOTES
[facility] ED Mental Health Handoff Note:       Brief HPI:  This is a 36 year old female signed out to me by Dr. George.  See initial ED Provider note for full details of the presentation. Interval history is pertinent for SI.    Home meds reviewed and ordered/administered: Yes    Medically stable for inpatient mental health admission: MED admit, covid +.    Evaluated by mental health: Yes. The recommendation is for inpatient mental health treatment. Bed search in process    Safety concerns: At the time I received sign out, there were no safety concerns.    Hold Status:  Active Orders   N/A            Exam:   Patient Vitals for the past 24 hrs:   BP Temp Temp src Pulse Resp SpO2   01/09/22 2050 (!) 133/91 97.8  F (36.6  C) Oral 113 -- 97 %   01/09/22 0837 (!) 143/110 97.7  F (36.5  C) Oral 112 18 99 %           ED Course:    Medications   glucose gel 15-30 g (has no administration in time range)     Or   dextrose 50 % injection 25-50 mL (has no administration in time range)     Or   glucagon injection 1 mg (has no administration in time range)   insulin aspart (NovoLOG) injection (RAPID ACTING) (2 Units Subcutaneous Given 1/9/22 1913)   insulin aspart (NovoLOG) injection (RAPID ACTING) (2 Units Subcutaneous Given 1/9/22 2210)   enoxaparin ANTICOAGULANT (LOVENOX) injection 40 mg (40 mg Subcutaneous Given 1/9/22 2213)   albuterol (PROVENTIL HFA/VENTOLIN HFA) inhaler (has no administration in time range)   benztropine (COGENTIN) tablet 0.5 mg (has no administration in time range)   hydrOXYzine (ATARAX) tablet 10 mg (has no administration in time range)   levothyroxine (SYNTHROID/LEVOTHROID) tablet 25 mcg (25 mcg Oral Given 1/9/22 0825)   lithium ER (LITHOBID) CR tablet 1,200 mg (1,200 mg Oral Given 1/9/22 2215)   metFORMIN (GLUCOPHAGE-XR) 24 hr tablet 1,000 mg (1,000 mg Oral Given 1/9/22 2045)   cariprazine (VRAYLAR) capsule CAPS 6 mg (6 mg Oral Given 1/9/22 2212)   insulin glargine (LANTUS PEN) injection 10  Units ( Subcutaneous Canceled Entry 1/9/22 2200)   0.9% sodium chloride BOLUS (0 mLs Intravenous Stopped 1/8/22 2030)            There were no significant events during my shift.    Patient was signed out to the oncoming provider      Impression:    ICD-10-CM    1. Disorganized behavior  R46.89    2. Schizoaffective disorder, bipolar type (H)  F25.0    3. Elevated LFTs  R79.89    4. Infection due to 2019 novel coronavirus  U07.1    5. Lab test positive for detection of COVID-19 virus  U07.1        Plan:    1. Awaiting med admit w psych consult      RESULTS:   Results for orders placed or performed during the hospital encounter of 01/08/22 (from the past 24 hour(s))   Glucose by meter     Status: Abnormal    Collection Time: 01/09/22  8:18 AM   Result Value Ref Range    GLUCOSE BY METER POCT 245 (H) 70 - 99 mg/dL   Glucose by meter     Status: Abnormal    Collection Time: 01/09/22 12:26 PM   Result Value Ref Range    GLUCOSE BY METER POCT 281 (H) 70 - 99 mg/dL   Glucose by meter     Status: Abnormal    Collection Time: 01/09/22  7:01 PM   Result Value Ref Range    GLUCOSE BY METER POCT 186 (H) 70 - 99 mg/dL   Glucose by meter     Status: Abnormal    Collection Time: 01/09/22 10:09 PM   Result Value Ref Range    GLUCOSE BY METER POCT 285 (H) 70 - 99 mg/dL   Glucose by meter     Status: Abnormal    Collection Time: 01/10/22  2:16 AM   Result Value Ref Range    GLUCOSE BY METER POCT 184 (H) 70 - 99 mg/dL             MD Hamzah Hayes, Larry Bynum MD  01/10/22 0536

## 2022-01-10 NOTE — TELEPHONE ENCOUNTER
R: Patient in [place] ER waiting bed placement.     Patient COVID positive. Currently no beds available for [id number] COVID IP unit. Patient will remain on worklist pending bed availability on [id number].

## 2022-01-10 NOTE — ED NOTES
Triage & Transition Services, Extended Care     [facility] is reviewed for Extended Care service. Will follow and meet with patient/family as able or requested. Please call [phone] with urgent needs.     Monique Zuñiga [facility]  [facility], Extended Care   [phone]

## 2022-01-11 ENCOUNTER — NURSE TRIAGE (OUTPATIENT)
Dept: NURSING | Facility: CLINIC | Age: 37
End: 2022-01-11
Payer: MEDICARE

## 2022-01-11 VITALS
HEIGHT: 64 IN | WEIGHT: 256 LBS | OXYGEN SATURATION: 97 % | HEART RATE: 103 BPM | SYSTOLIC BLOOD PRESSURE: 134 MMHG | BODY MASS INDEX: 43.71 KG/M2 | RESPIRATION RATE: 20 BRPM | TEMPERATURE: 97.7 F | DIASTOLIC BLOOD PRESSURE: 90 MMHG

## 2022-01-11 LAB
GLUCOSE BLDC GLUCOMTR-MCNC: 193 MG/DL (ref 70–99)
GLUCOSE BLDC GLUCOMTR-MCNC: 217 MG/DL (ref 70–99)

## 2022-01-11 PROCEDURE — 250N000013 HC RX MED GY IP 250 OP 250 PS 637: Performed by: HOSPITALIST

## 2022-01-11 PROCEDURE — 99232 SBSQ HOSP IP/OBS MODERATE 35: CPT | Performed by: PSYCHIATRY & NEUROLOGY

## 2022-01-11 PROCEDURE — 99239 HOSP IP/OBS DSCHRG MGMT >30: CPT | Performed by: INTERNAL MEDICINE

## 2022-01-11 RX ORDER — LITHIUM CARBONATE 300 MG/1
1200 TABLET, FILM COATED, EXTENDED RELEASE ORAL AT BEDTIME
Qty: 120 TABLET | Refills: 1 | Status: ON HOLD | OUTPATIENT
Start: 2022-01-11 | End: 2022-03-29

## 2022-01-11 RX ORDER — CARIPRAZINE 6 MG/1
6 CAPSULE, GELATIN COATED ORAL AT BEDTIME
Qty: 30 CAPSULE | Refills: 1 | Status: ON HOLD | OUTPATIENT
Start: 2022-01-11 | End: 2022-03-03

## 2022-01-11 RX ORDER — BENZTROPINE MESYLATE 0.5 MG/1
0.5 TABLET ORAL DAILY PRN
Qty: 30 TABLET | Refills: 1 | Status: ON HOLD | OUTPATIENT
Start: 2022-01-11 | End: 2022-03-29

## 2022-01-11 RX ORDER — METFORMIN HCL 500 MG
1000 TABLET, EXTENDED RELEASE 24 HR ORAL
Qty: 60 TABLET | Refills: 1 | Status: SHIPPED | OUTPATIENT
Start: 2022-01-11 | End: 2022-01-19

## 2022-01-11 RX ORDER — LITHIUM CARBONATE 300 MG/1
1200 TABLET, FILM COATED, EXTENDED RELEASE ORAL AT BEDTIME
Qty: 120 TABLET | Refills: 1 | Status: SHIPPED | OUTPATIENT
Start: 2022-01-11 | End: 2022-01-11

## 2022-01-11 RX ORDER — LEVOTHYROXINE SODIUM 25 UG/1
25 TABLET ORAL
Qty: 30 TABLET | Refills: 1 | Status: ON HOLD | OUTPATIENT
Start: 2022-01-11 | End: 2022-03-29

## 2022-01-11 RX ORDER — HYDROXYZINE HYDROCHLORIDE 10 MG/1
10 TABLET, FILM COATED ORAL DAILY PRN
Qty: 30 TABLET | Refills: 1 | Status: ON HOLD | OUTPATIENT
Start: 2022-01-11 | End: 2022-03-03

## 2022-01-11 RX ADMIN — LEVOTHYROXINE SODIUM 25 MCG: 25 TABLET ORAL at 08:33

## 2022-01-11 RX ADMIN — INSULIN ASPART 2 UNITS: 100 INJECTION, SOLUTION INTRAVENOUS; SUBCUTANEOUS at 08:33

## 2022-01-11 RX ADMIN — INSULIN ASPART 2 UNITS: 100 INJECTION, SOLUTION INTRAVENOUS; SUBCUTANEOUS at 11:32

## 2022-01-11 ASSESSMENT — ACTIVITIES OF DAILY LIVING (ADL)
ADLS_ACUITY_SCORE: 5

## 2022-01-11 NOTE — PLAN OF CARE
"BP (!) 141/91   Pulse 106   Temp 97.7  F (36.5  C) (Oral)   Resp 18   Ht 1.626 m (5' 4\")   Wt 116.1 kg (256 lb)   LMP  (LMP Unknown)   SpO2 97%   BMI 43.94 kg/m  .     Maddy Ortiz is a 36 yr old female admitted on 1/8/22 for SI. COVID-19 diagnosed upon admission. Hx of schizoaffective disorder, depression, bipolar, and DMII.    Pt is alert and verbal. Oriented x 4. Calm, cooperative. Baseline numbness and tingling in bilateral lower extremities. Trace edema in bilateral hands and feet. LS clear. Currently on RA. Denies SOB. Denies chest pain. Last known BM on 1/8/22. Voids spontaneously. Independent in room. OOB to use restroom, otherwise remains in bed. R PIV and L PIV, saline locked. Pt on moderate consistent carb diet w/ blood sugar checks and novolog before meals. Lantus injections at bedtime. Pt denied pain at the beginning of shift. At end of shift pt reported abdominal discomfort - states this happens when her medications are increased. Pt reported being off medication for at least one week prior to admission.       "
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
## 2022-01-11 NOTE — PLAN OF CARE
A/O x 4. No report of suicidal ideation overnight. Sitter at bedside. No report of pain.did request to change clothes and get washed up, gave sitter new scrubs and  items for bath. VSS. Cont to assess.
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
## 2022-01-11 NOTE — PROGRESS NOTES
Triage & Transition Services, Extended Care     [facility] is reviewed for Extended Care service. Will follow and meet with patient/family as able or requested. Please call [phone] with urgent needs.     Monique Zuñiga [facility]  [facility], Extended Care   [phone]

## 2022-01-11 NOTE — PLAN OF CARE
"BP (!) 134/90 (BP Location: Right arm, Patient Position: Sitting)   Pulse 103   Temp 97.7  F (36.5  C) (Oral)   Resp 20   Ht 1.626 m (5' 4\")   Wt 116.1 kg (256 lb)   LMP  (LMP Unknown)   SpO2 97%   BMI 43.94 kg/m       1:1 sitter discontinued today after Psych consult was done this morning. Denies pain and nausea - patient reports emesis occurrence yesterday but none today - ate a late breakfast (omelette and bagel). Denies chest pain, shortness of breath - reports intermittent cough but lungs sound clear/slightly diminished. Encouraged IS use.     Denies SI or thoughts of self harm when asked, affect is somewhat withdrawn/quiet and seems to avoid eye contact. Per psych patient is safe to discharge. Plan to discharge this evening.   "

## 2022-01-11 NOTE — CONSULTS
Triage and Transition - Consult and Liaison     Maddy Ortiz  January 11, 2022      This note is entered in addition to note by Dr. Jay Jay Rouse (1/11/22). Consult and Liaison has facilitated setting up appointments for patient . The appointments are as follows:     1/25 at 8am   [facility]  [phone]    [facility]  Triage and Transition - Consult and Liaison   [phone]      [facility] maintains an extensive network of licensed behavioral health providers to connect patients with the services they need.  We do not charge providers a fee to participate in our referral network.  We match patients with providers based on a patient s specific treatment needs, insurance coverage, and location. Our first effort will be to refer you to a provider within your care system and will utilize providers outside your care system as needed.

## 2022-01-11 NOTE — TELEPHONE ENCOUNTER
R: it appears pt transferred to 8A yesterday. Author called 8A RN asking if they are still wanting pt to trans to 2800 at [facility] and she said yes. Author informed her that Ol[facility]'s psych consult from 1/9 did not mention a recommendation for admitting to psych. Author asked RN to clarify whether they are still seeking psych placement for pt. She said she will have the charge RN check and then call intake back. mbw  Per call to RN on 8a at 2:06 pm, pt is discharging home and no longer needs a psych bed. gonzález

## 2022-01-11 NOTE — PROGRESS NOTES
Pt discharged home today at 1700. Pt's father driving patient home. Discharge teaching completed and discharge form signed. Pharmacy medication reviewed and form signed. No IVs. Pt leaving with two bags of personal belongings. Pt sent with 5 extra facemasks due to COVID-19 infection. All questions an concerns answered.
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
## 2022-01-11 NOTE — CONSULTS
Psychiatry Consultation; Follow up              Reason for Consult, requesting source:    Routine f/u. Initially seen by Dr Olmos on 1/9. And has seen [facility] regarding MH services  Requesting source: Jovan Monzon    Labs and imaging reviewed, discussed with nursing     Telemedicine Visit: The patient was seen for a visit utilizing the [employer] system. Permission from the patient to conduct the exam by telemedicine was obtained prior to proceeding.  She was also informed that insurance will be billed for this contact.   Start Time: 11;40  Stop Time: 11:55  Patient Location):  [facility]   Provider Location: [facility]  As the provider I attest to compliance with applicable laws and regulations related to telemedicine                Interim history:    This 36-year-old woman with a diagnosis of schizoaffective disorder had been off her medications for a week and presented very disorganized and somewhat delusional.  She is doing better now that she is back on her medications and she is tolerating them well.  Dr. Olmos had suggested restarting her lithium at a lower dose, but she does seem to tolerate the 1200 mg dose okay.   She lives with her 13-year-old daughter who is currently staying with her father.  She thinks that she is stable enough to return home, but have her daughter stay with her father so she would not be exposed to her COVID. She denies any SI or thoughts of self harm.   She does not really care for the psychiatric care she gets at [facility] so she would like to find some other place to medications.  However, she has worked in the past with [facility] and she has called there to arrange in an appointment.           Medications:     Current Facility-Administered Medications   Medication     albuterol (PROVENTIL HFA/VENTOLIN HFA) inhaler     benztropine (COGENTIN) tablet 0.5 mg     cariprazine (VRAYLAR) capsule CAPS 6 mg     glucose  "gel 15-30 g    Or     dextrose 50 % injection 25-50 mL    Or     glucagon injection 1 mg     enoxaparin ANTICOAGULANT (LOVENOX) injection 40 mg     hydrOXYzine (ATARAX) tablet 10 mg     insulin aspart (NovoLOG) injection (RAPID ACTING)     insulin aspart (NovoLOG) injection (RAPID ACTING)     insulin glargine (LANTUS PEN) injection 10 Units     levothyroxine (SYNTHROID/LEVOTHROID) tablet 25 mcg     lithium ER (LITHOBID) CR tablet 1,200 mg     metFORMIN (GLUCOPHAGE-XR) 24 hr tablet 1,000 mg              MSE:     Lying quietly in the hospital bed, is well groomed, pleasant, cooperative. Eye contact is not good. Speech fluent. Moves upper extremities without difficulty, no tremor.  Associations tight.  Mood is \"fair\"  Affect quite restricted .  Thought process circumstantial.  Thought content negative for suicidal thoughts or delusions.  Orientation, recent and remote memory, attention span and concentration are not formally assessed.  Fund of knowledge, use of language appropriate.  Insight and judgment fair.     Vital signs:  Temp: 97.7  F (36.5  C) Temp src: Oral BP: (!) 134/90 Pulse: 103   Resp: 20 SpO2: 97 % O2 Device: None (Room air)   Height: 162.6 cm (5' 4\") Weight: 116.1 kg (256 lb)  Estimated body mass index is 43.94 kg/m  as calculated from the following:    Height as of this encounter: 1.626 m (5' 4\").    Weight as of this encounter: 116.1 kg (256 lb).            DSM-5 Diagnosis:   295.70  (F25) Schizoaffective Disorder Bipolar Type   AYAKA         Assessment:   She is stabilizing being back on her outpatient medications, and appears close to being ready for discharge.  She has psychotherapy set up but will need help finding a new place for her to medications, since she does not care for [facility].   She is no longer suicidal (if she ever was).           Summary of Recommendations:   Continue Vraylar 6 mg per day and lithium 1,200 mg HS  I have discontinued bedside attendant   She has an appointment for " "psychotherapy set up at [email or web address] Jan 25 at 1 pm  I will have [facility] contact her about medication management visit  She appears psychiatrically ready for discharge.   Page me or re-consult psychiatry as needed.     Jay Jay Rosue M.D.   [facility]   Contact information available via [facility] Paging/Directory.  If I am not available, then [facility] intake ([phone]) should know who   Is on call      \"Much or all of the text in this note was generated through the use of Dragon Dictate voice to text software. Errors in spelling or words which appear to be out of contact are unintentional, may be present due having escaped editing\"           "

## 2022-01-11 NOTE — PROGRESS NOTES
SPIRITUAL HEALTH SERVICES  SPIRITUAL ASSESSMENT Progress Note  [facility] ([facility]) 8 A Med Surge R 0813 1/11      REFERRAL SOURCE: Initial Visit    Pt mentioned she was discharging today and did not need anything from [facility].    PLAN: No plan at this time.     Collette Harvey  Associate    Pager: [phone]

## 2022-01-12 ENCOUNTER — MYC MEDICAL ADVICE (OUTPATIENT)
Dept: ALLERGY | Facility: CLINIC | Age: 37
End: 2022-01-12
Payer: MEDICARE

## 2022-01-12 ENCOUNTER — PATIENT OUTREACH (OUTPATIENT)
Dept: CARE COORDINATION | Facility: CLINIC | Age: 37
End: 2022-01-12
Payer: MEDICARE

## 2022-01-12 DIAGNOSIS — Z71.89 OTHER SPECIFIED COUNSELING: ICD-10-CM

## 2022-01-12 NOTE — PROGRESS NOTES
Clinic Care Coordination Contact    Background: Care Coordination referral placed from [facility] discharge report for reason of patient meeting criteria for a TCM outreach call by [facility] team.    Assessment: Upon chart review, [facility] Team member will cancel/close the referral for TCM outreach due to reason below:    Patient has discharged to a Group home, Memory Care or Nursing Home    Plan: Care Coordination referral for TCM outreach canceled.    TEREZA Nguyen  [facility] [place], [facility]

## 2022-01-12 NOTE — TELEPHONE ENCOUNTER
Patient does not appear to have an appointment scheduled.     Was to follow-up with provider towards the end of January. Message sent to assist in coordination of appointment    Cami CINTRON RN   Specialty Clinics

## 2022-01-12 NOTE — TELEPHONE ENCOUNTER
Patient called.  She vomited when she arrived home today.  She lost her metformin and cannot find it.  In looking at the chart there was a prescribed dose of metformin sent to the pharmacy today.  Patient lives in a group home and doesn't have a car.  Patient doesn't know how she is going to  her medication with having covid.  Explained the pharmacy has a drive through and she can just stay in her car and pick it up.  If she cannot find a ride she can call and see if they can send it to her.  She also stated she might have a  who can pick it up for her.  Patient plans to look into it tomorrow since she is really tired tonight.      Sridevi Rene RN   01/11/22 11:14 PM  [facility] Nurse Advisor    Reason for Disposition    Health Information question, no triage required and triager able to answer question    Additional Information    Negative: [1] Caller is not with the adult (patient) AND [2] reporting urgent symptoms    Negative: Lab result questions    Negative: Medication questions    Negative: Caller can't be reached by phone    Negative: Caller has already spoken to PCP or another triager    Negative: RN needs further essential information from caller in order to complete triage    Negative: Requesting regular office appointment    Negative: [1] Caller requesting NON-URGENT health information AND [2] PCP's office is the best resource    Protocols used: INFORMATION ONLY CALL - NO TRIAGE[facility]

## 2022-01-17 ENCOUNTER — VIRTUAL VISIT (OUTPATIENT)
Dept: EDUCATION SERVICES | Facility: CLINIC | Age: 37
End: 2022-01-17
Payer: MEDICARE

## 2022-01-17 DIAGNOSIS — E11.9 TYPE 2 DIABETES MELLITUS WITHOUT COMPLICATION, WITHOUT LONG-TERM CURRENT USE OF INSULIN (H): Primary | ICD-10-CM

## 2022-01-17 PROCEDURE — 98966 PH1 ASSMT&MGMT NQHP 5-10: CPT | Mod: 95 | Performed by: DIETITIAN, REGISTERED

## 2022-01-17 NOTE — PROGRESS NOTES
Diabetes Follow-up    Subjective/Objective:  Type of Service: Telephone Visit    Originating Location (Patient Location): Home  Distant Location (Provider Location): Home  Mode of Communication:  Telephone    Telephone Visit Start Time: 108  Telephone Visit End Time (telephone visit stop time): 117    How would patient like to obtain AVS? [employer]    Diabetes is being managed with   Lifestyle (diet/activity), Diabetes Medications   Diabetes Medication(s)     Biguanides       metFORMIN (GLUCOPHAGE-XR) 500 MG 24 hr tablet    Take 2 tablets (1,000 mg) by mouth 2 times daily (with meals)          Lab Results   Component Value Date    A1C 8.4 01/08/2022    A1C 6.9 11/08/2021    A1C 4.7 06/09/2013         Assessment:  Napping / didn't sleep well and very tired today, kept visit short at patient's request.  Maddy notes higher blood sugars with being sick, but now back to 160 range.  A1c increasing despite diet / lifestyle intervention this past fall / winter.   Has recently switched from a sulfonylurea and given metformin a second try, recommend working up to full dose, telephone encounter to PCP.  Then may need to consider a GLP1 as a second line treatment.      Plan/Response:  Telephone encounter to PCP about metformin   Follow up via [employer] for titration / blood sugar updates    Anna Hampton RD, TRUDY, [facility]  Diabetes Education  Time spent: 9 minutes

## 2022-01-18 ENCOUNTER — TELEPHONE (OUTPATIENT)
Dept: EDUCATION SERVICES | Facility: CLINIC | Age: 37
End: 2022-01-18
Payer: MEDICARE

## 2022-01-18 DIAGNOSIS — E11.9 TYPE 2 DIABETES MELLITUS WITHOUT COMPLICATION, WITHOUT LONG-TERM CURRENT USE OF INSULIN (H): ICD-10-CM

## 2022-01-18 NOTE — TELEPHONE ENCOUNTER
Hi Dr. Vivar,     Please note if you approve of this plan or indicate an alternate plan.   OK to increase metformin to max dose - A1c 8.4 and blood sugars remaninig elevated      Thanks!  Yaa Hampton RD, LD, [facility]  Diabetes Education

## 2022-01-19 RX ORDER — METFORMIN HCL 500 MG
1000 TABLET, EXTENDED RELEASE 24 HR ORAL 2 TIMES DAILY WITH MEALS
Qty: 120 TABLET | Refills: 1 | Status: ON HOLD | OUTPATIENT
Start: 2022-01-19 | End: 2022-03-29

## 2022-01-19 NOTE — TELEPHONE ENCOUNTER
Updated patient via [employer] as planned.  Updated prescription and sent to the pharmacy    Anna Hampton RD, TRUDY, [facility]  Diabetes Education

## 2022-02-04 ENCOUNTER — OFFICE VISIT (OUTPATIENT)
Dept: NEUROLOGY | Facility: CLINIC | Age: 37
End: 2022-02-04
Attending: NURSE PRACTITIONER
Payer: MEDICARE

## 2022-02-04 VITALS
WEIGHT: 253.2 LBS | SYSTOLIC BLOOD PRESSURE: 130 MMHG | BODY MASS INDEX: 43.46 KG/M2 | DIASTOLIC BLOOD PRESSURE: 84 MMHG | OXYGEN SATURATION: 95 % | HEART RATE: 71 BPM

## 2022-02-04 DIAGNOSIS — M79.671 PAIN IN BOTH FEET: ICD-10-CM

## 2022-02-04 DIAGNOSIS — R20.2 NUMBNESS AND TINGLING OF BOTH LEGS: Primary | ICD-10-CM

## 2022-02-04 DIAGNOSIS — R20.2 PARESTHESIA: ICD-10-CM

## 2022-02-04 DIAGNOSIS — R20.0 NUMBNESS AND TINGLING OF BOTH LEGS: Primary | ICD-10-CM

## 2022-02-04 DIAGNOSIS — M79.672 PAIN IN BOTH FEET: ICD-10-CM

## 2022-02-04 PROCEDURE — 99203 OFFICE O/P NEW LOW 30 MIN: CPT | Performed by: STUDENT IN AN ORGANIZED HEALTH CARE EDUCATION/TRAINING PROGRAM

## 2022-02-04 RX ORDER — GABAPENTIN 300 MG/1
300 CAPSULE ORAL 3 TIMES DAILY
Qty: 90 CAPSULE | Refills: 4 | Status: ON HOLD | OUTPATIENT
Start: 2022-02-04 | End: 2022-03-29

## 2022-02-04 NOTE — NURSING NOTE
"Maddy Ortiz is a 36 year old female who presents for:  Chief Complaint   Patient presents with     NEUROPATHY     Discuss possible neuropathy LOC legs; inj question        Initial Vitals:  /84   Pulse 71   Wt 253 lb 3.2 oz (114.9 kg)   LMP  (LMP Unknown)   SpO2 95%   BMI 43.46 kg/m   Estimated body mass index is 43.46 kg/m  as calculated from the following:    Height as of 1/8/22: 5' 4\" (1.626 m).    Weight as of this encounter: 253 lb 3.2 oz (114.9 kg).. Body surface area is 2.28 meters squared. BP completed using cuff size: regular    Jone Merritt MA    "

## 2022-02-04 NOTE — PATIENT INSTRUCTIONS
Start gabapentin 300 mg nightly. After about one week, take 300mg twice daily. After another week, take 300mg three times daily for a total of 900mg per day   A common side effect of gabapentin is fatigue, which is why we advise patients to start taking the medication at nighttime. The side effect of fatigue should resolve with time.  Other possible side effects include swelling, and less likely diarrhea.  It is commonly a well tolerated medicine.      Other good things for nerve health:   Better diabetes control  Better sleep  Exercise but not too much exercise
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
## 2022-02-07 ENCOUNTER — OFFICE VISIT (OUTPATIENT)
Dept: PODIATRY | Facility: CLINIC | Age: 37
End: 2022-02-07
Payer: MEDICARE

## 2022-02-07 VITALS
HEART RATE: 97 BPM | BODY MASS INDEX: 43.19 KG/M2 | HEIGHT: 64 IN | WEIGHT: 253 LBS | DIASTOLIC BLOOD PRESSURE: 88 MMHG | SYSTOLIC BLOOD PRESSURE: 124 MMHG

## 2022-02-07 DIAGNOSIS — E11.43 TYPE II DIABETES MELLITUS WITH PERIPHERAL AUTONOMIC NEUROPATHY (H): Primary | ICD-10-CM

## 2022-02-07 DIAGNOSIS — M54.10 RADICULOPATHY WITH LOWER EXTREMITY SYMPTOMS: ICD-10-CM

## 2022-02-07 DIAGNOSIS — R23.4 FISSURE IN SKIN OF BOTH FEET: ICD-10-CM

## 2022-02-07 PROCEDURE — 99213 OFFICE O/P EST LOW 20 MIN: CPT | Performed by: PODIATRIST

## 2022-02-07 RX ORDER — AMMONIUM LACTATE 12 G/100G
CREAM TOPICAL 2 TIMES DAILY PRN
Qty: 385 G | Refills: 3 | Status: SHIPPED | OUTPATIENT
Start: 2022-02-07 | End: 2022-08-25

## 2022-02-07 ASSESSMENT — MIFFLIN-ST. JEOR: SCORE: 1817.6

## 2022-02-07 NOTE — NURSING NOTE
"Chief Complaint   Patient presents with     Consult     diabetic \"callused\"       Initial /88   Pulse 97   Ht 1.626 m (5' 4\")   Wt 114.8 kg (253 lb)   LMP  (LMP Unknown)   BMI 43.43 kg/m   Estimated body mass index is 43.43 kg/m  as calculated from the following:    Height as of this encounter: 1.626 m (5' 4\").    Weight as of this encounter: 114.8 kg (253 lb).  Medications and allergies reviewed.      Kathryn PATRICK MA    "

## 2022-02-07 NOTE — PATIENT INSTRUCTIONS
"DIABETES AND YOUR FEET  Diabetes can result in several problems in the feet including ulcers (open sores) and amputations. Two of the most important reasons why people develop foot problems when they have diabetes is : 1. Neuropathy (loss of feeling)  2. Vascular disease (loss or decrease of blood flow).    Neuropathy is a term used to describe a loss of nerve function.  Patients with diabetes are at risk of developing neuropathy if their sugars continue to run high and are above the normal value. One theory for neuropathy is that the \"extra\" sugar in the body enters the nerves and is broken down. These by-products build up in the nerve causing it to swell and impairing nerve function. Often times, this can be prevented by controlling your sugars, dieting and exercise.    When a person develops neuropathy, they usually begin to feel numbness or tingling in their feet and sometime in their legs.  Other symptoms may include painful burning or hot feet, tingling or feeling like insects or ants are crawling on your feet or legs.  If the diabetes is sever and the sugars run high for long periods of time, neuropathy can also occur in the hands.    Vascular disease  is a term used to describe a loss or decrease in circulation (blood flow). There is a problem in getting blood and oxygen to areas that need it. Similar to neuropathy, sugars can build up in the walls of the arteries (blood vessels) and cause them to become swollen, thickened and hardened. This decreases the amount of blood that can go to an area that needs it. Though this is common in the legs of diabetic patients, it can also affect other arteries (blood vessels) in the body such as in the heart and eyes.    In the legs, vascular disease usually results in cramping. Patients who develop leg cramps after walking the same distance every time (i.e. One block, half a mile, ect.) need to let their doctors know so that their circulation may be checked. Cramps " "causing severe pain in the feet and/or legs while sleeping and the cramps go away when you stand or hang your legs off the side of the bed, may also be a sign of poor blood circulation.  Occasional cramping in cold weather or on rare occasions with activity may not be due to poor circulation, but you should inform your doctor.    PREVENTION OF THESE DISEASES  The key to prevention is good blood sugar control. Poor blood sugar control is a big reason many of these problems start. Physical activity (exercise) is a very good way to help decrease your blood sugars. Exercise can lower your blood sugar, blood pressure, and cholesterol. It also reduces your risk for heart disease and stroke, relieves stress, and strengthens your heart, muscles and bones.  In addition, regular activity helps insulin work better, improves your blood circulation, and keeps your joints flexible. If you're trying to lose weight, a combination of exercise and wise food choices can help you reach your target weight and maintain it.      PAIN MANAGEMENT (**Please speak with your primary doctor about any medications**)  1.Blood Sugar Control - Most important  2. Medications such as:  Amytriptylline, duloxetine, gabapentin, lyrica, tramadol (talk with your primary care doctor about this).     NUTRITION:  Nutrition is also important to help with healing. If your body does not have what it needs, it can't heal.   1. Increasing your protein intake is important.  With wounds you need 60-90gm of protein a day to help with healing. Over the counter protein shakes such as Allen, Glucerna, Ensure, ect... can help to supplement your daily protein intake.   2. It is also important to take Vitamins to help with healing.  Vitamins such as B12, B6 and Vitamin D3 are important for healing. These can be gotten over the counter at pharmacies or at stores like [employer] or the [employer].    I can also prescribe a dietary supplement called \"Rheumate\" that has a lot of " essential vitamins in one capsule.  This may not be covered by insurance though.     FOOT CARE RECOMMENDATIONS   1. Wash your feet with lukewarm water and a mild soap and then dry them thoroughly, especially between the toes.     2. Examine your feet daily looking for cuts, corns, blisters, cracks, ect, especially after wearing new shoes. Make sure to look between your toes. If you cannot see the bottom of your feet, set a mirror on the floor and hold your foot over it, or ask a spouse, friend or family member to examine your feet for you. Contact your doctor immediately if new problems are noted or if sores are not healing.     3. Immediately apply moisturizer to the tops and bottoms of your feet, avoiding areas between the toes. Hand lotion (Intesive Care, Ruby, Eucerin, Neutrogena, Curel, ect) is sufficient unless your doctor prescribes a medicated lotion. Apply sunscreen to your feet when going swimming outside.     4. Use clean comfortable shoes, wear white socks (if you have any bleeding or drainage, you will see it on white socks). Socks should not have thick seams or cut off the circulation around the leg. Break in new shoes slowly and rotate with older shoes until broken in. Check the inside of your shoes with your hand to look for areas of irritation or objects that may have fallen into your shoes.       5. Keep slippers by the side of your bed for use during the night.     6.  Shoes should be fitted by a professional and should not cause areas of irritation.  Check your feet regularly when wearing a new pair of shoes and replace them as needed.     7.  Talk to your doctor about proper exercise. Exercise and stretching stimulate blood flow to your feet and maintain proper glucose levels.     8.  Monitor your blood glucose level as instructed by your doctor. Notify your doctor immediately if your blood sugar is abnormally high or low.    9. Cut your nails straight across, but then gently round any sharp  edges with a cardboard nail file. If you have neuropathy, peripheral vascular disease or cannot see that well to trim your own toenails contact [employer] ([phone]) or [employer] ([phone]).      THINGS TO AVOID DOING   1.  Do not soak your feet if you have an open sore. Use only lukewarm water and always check the temperature with your hand as hot water can easily burn your feet.       2.  Never use a hot water bottle or heating pad on your feet. Also do not apply cold compresses to your feet. With decreased sensation, you could burn or freeze your feet.       3.  Do not apply any of these to your feet:    -  Over the counter medicine for corns or warts    -  Harsh chemicals like boric acid    -  Do not self-treat corns, cuts, blisters or infections. Always consult your doctor.       4.  Do not wear sandals, slippers or walk barefoot, especially on hot sand or concrete or other harsh surfaces.     5.  If you smoke, stop!!!            Patient Education     Exercises to Strengthen Your Lower Back  Strong lower back and abdominal muscles work together to support your spine. The exercises below will help strengthen the lower back. It's important that you start exercising slowly and increase levels gradually.   Always start any exercise program with stretching. If you feel pain while doing any of these exercises, stop and talk to your doctor about a more specific exercise program that better suits your condition.    Low back stretch  The point of stretching is to make you more flexible and increase your range of motion. Stretch only as much as you are able. Stretch slowly. Don't push your stretch to the limit. If at any point you feel pain while stretching, this is your (temporary) limit.     Lie on your back with your knees bent and both feet on the ground.    Slowly raise your left knee to your chest as you flatten your lower back against the floor. Hold for 5 seconds.    Relax and repeat the exercise with  your right knee.    Do 10 of these exercises for each leg.    Repeat hugging both knees to your chest at the same time.  Building lower back strength  Start your exercise routine with 10 to 30 minutes a day, 1 to 3 times a day.  Initial exercises  Lying on your back:  1. Ankle pumps. Move your foot up and down, towards your head, and then away. Repeat 10 times with each foot.  2. Heel slides. Slowly bend your knee, drawing the heel of your foot towards you. Then slide your heel/foot from you, straightening your knee. Don't lift your foot off the floor (this is not a leg lift).  3. Abdominal contraction. Bend your knees and put your hands on your stomach. Tighten your stomach muscles. Hold for 5 seconds, then relax. Repeat 10 times.  4. Straight leg raise. Bend one leg at the knee and keep the other leg straight. Tighten your stomach muscles. Slowly lift your straight leg 6 to 12 inches off the floor and hold for up to 5 seconds. Repeat 10 times on each side.  Standin. Wall squats. Stand with your back against the wall. Move your feet about 12 inches away from the wall. Tighten your stomach muscles, and slowly bend your knees until they are at about a 45 degree angle. Don't go down too far. Hold about 5 seconds. Then slowly return to your starting position. Repeat 10 times.  2. Heel raises. Stand facing the wall. Slowly raise the heels of your feet up and down, while keeping your toes on the floor. If you have trouble balancing, you can touch the wall with your hands. Repeat 10 times.  More advanced exercises  When you feel comfortable enough, try these exercises.  1. Kneeling lumbar extension. Start on your hands and knees. At the same time, raise and straighten your right arm and left leg until they are parallel to the ground. Hold for 2 seconds and come back slowly to a starting position. Repeat with left arm and right leg, alternating 10 times.  2. Prone lumbar extension. Lie face down, arms extended  overhead, palms on the floor. At the same time, raise your right arm and left leg as high as comfortably possible. Hold for 10 seconds and slowly return to start. Repeat with left arm and right leg, alternating 10 times. Gradually build up to 20 times. (Advanced: Repeat this exercise raising both arms and both legs a few inches off the floor at the same time. Hold for 5 seconds and release.)  3. Pelvic tilt. Lie on the floor on your back with your knees bent at 90 degrees. Your feet should be flat on the floor. Inhale, exhale, then slowly contract your abdominal muscles bringing your navel toward your spine. Let your pelvis rock back until your lower back is flat on the floor. Hold for 10 seconds while breathing smoothly.  4. Abdominal crunch. Perform a pelvic tilt (above) flattening your lower back against the floor. Holding the tension in your abdominal muscles, take another breath and raise your shoulder blades off the ground (this is not a full sit-up). Keep your head in line with your body (don t bend your neck forward). Hold for 2 seconds, then slowly lower.  [employer] last reviewed this educational content on 8/1/2019 2000-2021 [employer]. All rights reserved. This information is not intended as a substitute for professional medical care. Always follow your healthcare professional's instructions.

## 2022-02-07 NOTE — PROGRESS NOTES
PATIENT HISTORY:  Maddy Ortiz is a 36 year old female who presents to clinic for a diabetic foot evaluation.  The patient relates having dry cracked skin on the soles of both feet. The patient relates some numbness to the feet.  The patient denies any redness, swelling or open sores on both feet.  The patient relates blood sugars are within normal limits.      REVIEW OF SYSTEMS:  Constitutional, HEENT, cardiovascular, pulmonary, GI, , musculoskeletal, neuro, skin, endocrine and psych systems are negative, except as otherwise noted.     PAST MEDICAL HISTORY:   Past Medical History:   Diagnosis Date     Bipolar 1 disorder (H) 12/6/2012     Depressive disorder      Diabetes mellitus, type 2 (H) 12/13/2021     Paranoid type delusional disorder (H) 11/14/2012        PAST SURGICAL HISTORY:   Past Surgical History:   Procedure Laterality Date     TONSILLECTOMY & ADENOIDECTOMY      as a child     TONSILLECTOMY & ADENOIDECTOMY          MEDICATIONS:   Current Outpatient Medications:      albuterol (PROAIR HFA/PROVENTIL HFA/VENTOLIN HFA) 108 (90 Base) MCG/ACT inhaler, Inhale 2 puffs into the lungs every 4 hours as needed for wheezing or shortness of breath / dyspnea, Disp: 18 g, Rfl: 3     ammonium lactate (LAC-HYDRIN) 12 % external cream, Apply topically 2 times daily as needed for dry skin, Disp: 385 g, Rfl: 3     benztropine (COGENTIN) 0.5 MG tablet, Take 1 tablet (0.5 mg) by mouth daily as needed, Disp: 30 tablet, Rfl: 1     gabapentin (NEURONTIN) 300 MG capsule, Take 1 capsule (300 mg) by mouth 3 times daily, Disp: 90 capsule, Rfl: 4     hydrOXYzine (ATARAX) 10 MG tablet, Take 1 tablet (10 mg) by mouth daily as needed for itching or anxiety As needed, Disp: 30 tablet, Rfl: 1     levonorgestrel-ethinyl estradiol (AVIANE) 0.1-20 MG-MCG tablet, Take 1 tablet by mouth daily, Disp: 84 tablet, Rfl: 3     levothyroxine (SYNTHROID/LEVOTHROID) 25 MCG tablet, Take 1 tablet (25 mcg) by mouth every morning (before breakfast),  Disp: 30 tablet, Rfl: 1     lithium ER (LITHOBID) 300 MG CR tablet, Take 4 tablets (1,200 mg) by mouth At Bedtime, Disp: 120 tablet, Rfl: 1     Melatonin 10 MG TABS tablet, Take 10 mg by mouth nightly as needed for sleep , Disp: , Rfl:      metFORMIN (GLUCOPHAGE-XR) 500 MG 24 hr tablet, Take 2 tablets (1,000 mg) by mouth 2 times daily (with meals), Disp: 120 tablet, Rfl: 1     terbinafine (LAMISIL AT) 1 % external cream, Apply topically 2 times daily, Disp: 12 g, Rfl: 1     VRAYLAR 6 MG CAPS capsule, Take 1 capsule (6 mg) by mouth At Bedtime, Disp: 30 capsule, Rfl: 1     ALLERGIES:    Allergies   Allergen Reactions     Dust Mites Shortness Of Breath     Animal Dander      Other reaction(s): *Unknown     Metformin Fatigue     Mold      Other reaction(s): Runny Nose     Trees         SOCIAL HISTORY:   Social History     Socioeconomic History     Marital status: Single     Spouse name: Not on file     Number of children: Not on file     Years of education: Not on file     Highest education level: Not on file   Occupational History     Employer: CURRENTLY UNEMPLOYED AT THIS TIME   Tobacco Use     Smoking status: Never Smoker     Smokeless tobacco: Never Used     Tobacco comment: around 2nd hand smoke   Vaping Use     Vaping Use: Never used   Substance and Sexual Activity     Alcohol use: Not Currently     Comment: rare wine ( very rare)     Drug use: Not Currently     Sexual activity: Not Currently     Partners: Male     Birth control/protection: None   Other Topics Concern     Parent/sibling w/ CABG, MI or angioplasty before 65F 55M? No     Comment: patient was adopted; unknown family history   Social History Narrative    One child    Education: some college        October 21, 2021    ENVIRONMENTAL HISTORY: [place] The home is heated with a electric furnace. They do not have central air conditioning, does have box air conditioner in the wall and has air purifier. The  "patient's bedroom is furnished with stuffed animals in bed, carpeting in bedroom and fabric window coverings. Pets inside the apartment includes 1 cat. There is history of cockroach or mice infestation. There are no smokers in the house.  The apartment does not have a basement.      Social Determinants of Health     Financial Resource Strain: Not on file   Food Insecurity: Not on file   Transportation Needs: Not on file   Physical Activity: Not on file   Stress: Not on file   Social Connections: Not on file   Intimate Partner Violence: Not on file   Housing Stability: Not on file        FAMILY HISTORY:   Family History   Adopted: Yes   Problem Relation Age of Onset     Unknown/Adopted Mother      Unknown/Adopted Father      Unknown/Adopted Other      Hypertension Sister         Vitals: /88   Pulse 97   Ht 1.626 m (5' 4\")   Wt 114.8 kg (253 lb)   LMP  (LMP Unknown)   BMI 43.43 kg/m         Lower Extremity Evaluation:     Dermatologic: Skin is intact to both lower extremities without significant lesions, rash or abrasion.  Noted xerotic skin on the soles of both feet.     Vascular: DP & PT pulses are intact & regular bilaterally.   Capillary filling and skin temperature is normal to both lower extremities.  There are no varicosities, no edema and no trophic changes noted.      Neurologic:   No evidence of weakness in the lower extremities.  Noted evidence of neuropathy with diminished sensation bilaterally.       Musculoskeletal: Patient is ambulatory without assistive device or brace.  No gross ankle deformity noted.  No foot or ankle joint effusion is noted.  One notes hammertoe contracture of the lesser toes bilaterally.    Assessment:        ICD-10-CM    1. Type II diabetes mellitus with peripheral autonomic neuropathy (H)  E11.43 ammonium lactate (LAC-HYDRIN) 12 % external cream   2. Fissure in skin of both feet  R23.4 ammonium lactate (LAC-HYDRIN) 12 % external cream   3. Radiculopathy with lower " extremity symptoms  M54.10            Plan:  I have explained to the patient the underlying condition affecting the feet.  At this point, the patient was prescribed Lac Hydrin lotion to be applied to the feet daily.       I have discussed with the patient the importance of diabetic foot care and daily inspection of the feet.  The patient was instructed to come in anytime if there is any concern about the feet with redness, swelling or drainage is noted.    There is low risk of morbidity with the procedure.  There was no overlap in work associated with the evaluation/management and the work associated with the procedure.    Maddy verbalized agreement with and understanding of the rational for the diagnosis and treatment plan.  All questions were answered to best of my ability and the patient's satisfaction. The patient was advised to contact the clinic with any questions that may arise after the clinic visit.      Disclaimer: This note consists of symbols derived from keyboarding, dictation and/or voice recognition software. As a result, there may be errors in the script that have gone undetected. Please consider this when interpreting information found in this chart.        ALCIRA Garza D.P.M., F.ABNER.C.F.A.S.

## 2022-02-07 NOTE — LETTER
2/7/2022         RE: Maddy Ortiz  [address]  [facility] [address]        Dear Colleague,    Thank you for referring your patient, Maddy Ortiz, to the [facility] [place]. Please see a copy of my visit note below.    PATIENT HISTORY:  Maddy Ortiz is a 36 year old female who presents to clinic for a diabetic foot evaluation.  The patient relates having dry cracked skin on the soles of both feet. The patient relates some numbness to the feet.  The patient denies any redness, swelling or open sores on both feet.  The patient relates blood sugars are within normal limits.      REVIEW OF SYSTEMS:  Constitutional, HEENT, cardiovascular, pulmonary, GI, , musculoskeletal, neuro, skin, endocrine and psych systems are negative, except as otherwise noted.     PAST MEDICAL HISTORY:   Past Medical History:   Diagnosis Date     Bipolar 1 disorder (H) 12/6/2012     Depressive disorder      Diabetes mellitus, type 2 (H) 12/13/2021     Paranoid type delusional disorder (H) 11/14/2012        PAST SURGICAL HISTORY:   Past Surgical History:   Procedure Laterality Date     TONSILLECTOMY & ADENOIDECTOMY      as a child     TONSILLECTOMY & ADENOIDECTOMY          MEDICATIONS:   Current Outpatient Medications:      albuterol (PROAIR HFA/PROVENTIL HFA/VENTOLIN HFA) 108 (90 Base) MCG/ACT inhaler, Inhale 2 puffs into the lungs every 4 hours as needed for wheezing or shortness of breath / dyspnea, Disp: 18 g, Rfl: 3     ammonium lactate (LAC-HYDRIN) 12 % external cream, Apply topically 2 times daily as needed for dry skin, Disp: 385 g, Rfl: 3     benztropine (COGENTIN) 0.5 MG tablet, Take 1 tablet (0.5 mg) by mouth daily as needed, Disp: 30 tablet, Rfl: 1     gabapentin (NEURONTIN) 300 MG capsule, Take 1 capsule (300 mg) by mouth 3 times daily, Disp: 90 capsule, Rfl: 4     hydrOXYzine (ATARAX) 10 MG tablet, Take 1 tablet (10 mg) by mouth daily as needed for itching or anxiety As needed,  Disp: 30 tablet, Rfl: 1     levonorgestrel-ethinyl estradiol (AVIANE) 0.1-20 MG-MCG tablet, Take 1 tablet by mouth daily, Disp: 84 tablet, Rfl: 3     levothyroxine (SYNTHROID/LEVOTHROID) 25 MCG tablet, Take 1 tablet (25 mcg) by mouth every morning (before breakfast), Disp: 30 tablet, Rfl: 1     lithium ER (LITHOBID) 300 MG CR tablet, Take 4 tablets (1,200 mg) by mouth At Bedtime, Disp: 120 tablet, Rfl: 1     Melatonin 10 MG TABS tablet, Take 10 mg by mouth nightly as needed for sleep , Disp: , Rfl:      metFORMIN (GLUCOPHAGE-XR) 500 MG 24 hr tablet, Take 2 tablets (1,000 mg) by mouth 2 times daily (with meals), Disp: 120 tablet, Rfl: 1     terbinafine (LAMISIL AT) 1 % external cream, Apply topically 2 times daily, Disp: 12 g, Rfl: 1     VRAYLAR 6 MG CAPS capsule, Take 1 capsule (6 mg) by mouth At Bedtime, Disp: 30 capsule, Rfl: 1     ALLERGIES:    Allergies   Allergen Reactions     Dust Mites Shortness Of Breath     Animal Dander      Other reaction(s): *Unknown     Metformin Fatigue     Mold      Other reaction(s): Runny Nose     Trees         SOCIAL HISTORY:   Social History     Socioeconomic History     Marital status: Single     Spouse name: Not on file     Number of children: Not on file     Years of education: Not on file     Highest education level: Not on file   Occupational History     Employer: CURRENTLY UNEMPLOYED AT THIS TIME   Tobacco Use     Smoking status: Never Smoker     Smokeless tobacco: Never Used     Tobacco comment: around 2nd hand smoke   Vaping Use     Vaping Use: Never used   Substance and Sexual Activity     Alcohol use: Not Currently     Comment: rare wine ( very rare)     Drug use: Not Currently     Sexual activity: Not Currently     Partners: Male     Birth control/protection: None   Other Topics Concern     Parent/sibling w/ CABG, MI or angioplasty before 65F 55M? No     Comment: patient was adopted; unknown family history   Social History Narrative    One child    Education: some  "college        October 21, 2021    ENVIRONMENTAL HISTORY: [place] The home is heated with a electric furnace. They do not have central air conditioning, does have box air conditioner in the wall and has air purifier. The patient's bedroom is furnished with stuffed animals in bed, carpeting in bedroom and fabric window coverings. Pets inside the apartment includes 1 cat. There is history of cockroach or mice infestation. There are no smokers in the house.  The apartment does not have a basement.      Social Determinants of Health     Financial Resource Strain: Not on file   Food Insecurity: Not on file   Transportation Needs: Not on file   Physical Activity: Not on file   Stress: Not on file   Social Connections: Not on file   Intimate Partner Violence: Not on file   Housing Stability: Not on file        FAMILY HISTORY:   Family History   Adopted: Yes   Problem Relation Age of Onset     Unknown/Adopted Mother      Unknown/Adopted Father      Unknown/Adopted Other      Hypertension Sister         Vitals: /88   Pulse 97   Ht 1.626 m (5' 4\")   Wt 114.8 kg (253 lb)   LMP  (LMP Unknown)   BMI 43.43 kg/m         Lower Extremity Evaluation:     Dermatologic: Skin is intact to both lower extremities without significant lesions, rash or abrasion.  Noted xerotic skin on the soles of both feet.     Vascular: DP & PT pulses are intact & regular bilaterally.   Capillary filling and skin temperature is normal to both lower extremities.  There are no varicosities, no edema and no trophic changes noted.      Neurologic:   No evidence of weakness in the lower extremities.  Noted evidence of neuropathy with diminished sensation bilaterally.       Musculoskeletal: Patient is ambulatory without assistive device or brace.  No gross ankle deformity noted.  No foot or ankle joint effusion is noted.  One notes hammertoe contracture of the lesser toes bilaterally.    Assessment: "        ICD-10-CM    1. Type II diabetes mellitus with peripheral autonomic neuropathy (H)  E11.43 ammonium lactate (LAC-HYDRIN) 12 % external cream   2. Fissure in skin of both feet  R23.4 ammonium lactate (LAC-HYDRIN) 12 % external cream   3. Radiculopathy with lower extremity symptoms  M54.10            Plan:  I have explained to the patient the underlying condition affecting the feet.  At this point, the patient was prescribed Lac Hydrin lotion to be applied to the feet daily.       I have discussed with the patient the importance of diabetic foot care and daily inspection of the feet.  The patient was instructed to come in anytime if there is any concern about the feet with redness, swelling or drainage is noted.    There is low risk of morbidity with the procedure.  There was no overlap in work associated with the evaluation/management and the work associated with the procedure.    Maddy verbalized agreement with and understanding of the rational for the diagnosis and treatment plan.  All questions were answered to best of my ability and the patient's satisfaction. The patient was advised to contact the clinic with any questions that may arise after the clinic visit.      Disclaimer: This note consists of symbols derived from keyboarding, dictation and/or voice recognition software. As a result, there may be errors in the script that have gone undetected. Please consider this when interpreting information found in this chart.        DARNELL Abel.P.JAMAL., F.A.C.F.A.S.            Again, thank you for allowing me to participate in the care of your patient.        Sincerely,        Rachid Garza DPM

## 2022-02-10 ENCOUNTER — VIRTUAL VISIT (OUTPATIENT)
Dept: SURGERY | Facility: CLINIC | Age: 37
End: 2022-02-10
Payer: MEDICARE

## 2022-02-10 DIAGNOSIS — E66.01 OBESITY, CLASS III, BMI 40-49.9 (MORBID OBESITY) (H): ICD-10-CM

## 2022-02-10 DIAGNOSIS — E11.9 TYPE 2 DIABETES MELLITUS WITHOUT COMPLICATION, WITHOUT LONG-TERM CURRENT USE OF INSULIN (H): Primary | ICD-10-CM

## 2022-02-10 PROCEDURE — 97803 MED NUTRITION INDIV SUBSEQ: CPT | Mod: 95 | Performed by: DIETITIAN, REGISTERED

## 2022-02-10 NOTE — LETTER
2/10/2022         RE: Maddy Ortiz  [address]  [facility] [address]        Dear Colleague,    Thank you for referring your patient, Maddy Ortiz, to the [facility] [place]. Please see a copy of my visit note below.    Maddy Ortiz is a 36 year old who is being evaluated via a billable video visit.      How would you like to obtain your AVS? MyChart  If the video visit is dropped, the invitation should be resent by: Send to e-mail at: [email or web address]  Will anyone else be joining your video visit? Yes: ILS worker.     Video Start Time: 9:00 AM      Medical  Weight Loss Follow-Up Diet Evaluation  Assessment:  Maddy is presenting today for a follow up weight management nutrition consultation. Pt has had an initial appointment with Dr. Serna  Weight loss medication: metformin.   Current weight: 253lb    Changes and Difficulties 10/15/2018   I have made the following changes to my diet since my last visit: soup no pizza   With regards to my diet, I am still struggling with: none   I have made the following changes to my activity/exercise since my last visit: walking   With regards to my activity/exercise, I am still struggling with: tiredness     BMI: 43.43  Ideal body weight: 54.7 kg (120 lb 9.5 oz)  Adjusted ideal body weight: 78.7 kg (173 lb 8.9 oz)    Estimated RMR ([place]-St Jeor equation):   1820 kcals x 1.2 (sedentary) = 2184 kcals (for weight maintenance)  Recommended Protein Intake: 60-80 grams of protein/day  Patient Active Problem List:  Patient Active Problem List   Diagnosis     Animal dander allergy     CARDIOVASCULAR SCREENING; LDL GOAL LESS THAN 160     Psychosis (H)     Paranoid type delusional disorder (H)     Bipolar 1 disorder (H)     Insomnia     Depression with anxiety     Seasonal allergic rhinitis due to pollen     Allergic rhinitis due to mold     Allergic rhinitis due to animal dander     Allergic rhinitis due to dust  mite     Encounter for IUD insertion     Schizoaffective disorder, bipolar type (H)     Gastroesophageal reflux disease without esophagitis     Paranoia (psychosis) (H)     Morbid obesity (H)     Schizoaffective disorder (H)     Umbilical hernia without obstruction and without gangrene     Fatty liver     Diabetes mellitus, type 2 (H)     Elevated LFTs     Disorganized behavior     Infection due to 2019 novel coronavirus     Diabetes: testing every morning- 180-200 most days    Progress on goals from last visit: started gabapentin and states this makes her feel a unstable  +stressed about meal planning- states she prepares meals for her daughter but has a hard time planning things for herself. Reports not having a positive experience shopping at [employer]  +not liking animal protein at the moment- so we discussed plant based alternatives for protein  Goals:  1. Limit sweets and desserts to 1 per day- goal somewhat met  +states she has been eating more veggies    Dietary Recall:  Breakfast: carrot and candy   Lunch:1 rice bowls  +veggies throughout the day- cucumber and carrots  Exercise:  Going for a walk daily    Nutrition Diagnosis:      Overweight/Obesity (NC 3.3) related to overeating and poor lifestyle habits as evidenced by patient's report of inconsistent meals, large portions, frequent snacking of sweets, lack of activity and BMI 43.43    Intervention:  1. Food and/or nutrient delivery: discussed alternate sources of protein such as beans, yogurt and cottage cheese to try  2. Nutrition education: reviewed meal planning, encouraged patient to sit down with her daughter to plan meals for the week and to go to the grocery store with a plan and a list- ILS worker will take her to [employer] and [employer] to determine if shopping there will be a better experience  3. Nutrition counseling: motivational interviewing and goal setting    Monitoring/Evaluation:    Goals:  1. Add more variety for protein sources  2. Test blood  glucose at different times throughout the day  3. Plan meals with daughter    Patient to follow up in 1 month(s) with ELVIA      Video-Visit Details    Type of service:  Video Visit    Video End Time:9:30a    Originating Location (pt. Location): Home    Distant Location (provider location):  [facility] [place]     Platform used for Video Visit: Perri FAULKNER RD        Again, thank you for allowing me to participate in the care of your patient.        Sincerely,        KARLA FAULKNER RD

## 2022-02-10 NOTE — PROGRESS NOTES
Maddy Ortiz is a 36 year old who is being evaluated via a billable video visit.      How would you like to obtain your AVS? MyChart  If the video visit is dropped, the invitation should be resent by: Send to e-mail at: [email or web address]  Will anyone else be joining your video visit? Yes: ILS worker.     Video Start Time: 9:00 AM      Medical  Weight Loss Follow-Up Diet Evaluation  Assessment:  Maddy is presenting today for a follow up weight management nutrition consultation. Pt has had an initial appointment with Dr. Serna  Weight loss medication: metformin.   Current weight: 253lb    Changes and Difficulties 10/15/2018   I have made the following changes to my diet since my last visit: soup no pizza   With regards to my diet, I am still struggling with: none   I have made the following changes to my activity/exercise since my last visit: walking   With regards to my activity/exercise, I am still struggling with: tiredness     BMI: 43.43  Ideal body weight: 54.7 kg (120 lb 9.5 oz)  Adjusted ideal body weight: 78.7 kg (173 lb 8.9 oz)    Estimated RMR ([place]-St Jeor equation):   1820 kcals x 1.2 (sedentary) = 2184 kcals (for weight maintenance)  Recommended Protein Intake: 60-80 grams of protein/day  Patient Active Problem List:  Patient Active Problem List   Diagnosis     Animal dander allergy     CARDIOVASCULAR SCREENING; LDL GOAL LESS THAN 160     Psychosis (H)     Paranoid type delusional disorder (H)     Bipolar 1 disorder (H)     Insomnia     Depression with anxiety     Seasonal allergic rhinitis due to pollen     Allergic rhinitis due to mold     Allergic rhinitis due to animal dander     Allergic rhinitis due to dust mite     Encounter for IUD insertion     Schizoaffective disorder, bipolar type (H)     Gastroesophageal reflux disease without esophagitis     Paranoia (psychosis) (H)     Morbid obesity (H)     Schizoaffective disorder (H)     Umbilical hernia without obstruction and without gangrene      Fatty liver     Diabetes mellitus, type 2 (H)     Elevated LFTs     Disorganized behavior     Infection due to 2019 novel coronavirus     Diabetes: testing every morning- 180-200 most days    Progress on goals from last visit: started gabapentin and states this makes her feel a unstable  +stressed about meal planning- states she prepares meals for her daughter but has a hard time planning things for herself. Reports not having a positive experience shopping at [employer]  +not liking animal protein at the moment- so we discussed plant based alternatives for protein  Goals:  1. Limit sweets and desserts to 1 per day- goal somewhat met  +states she has been eating more veggies    Dietary Recall:  Breakfast: carrot and candy   Lunch:1 rice bowls  +veggies throughout the day- cucumber and carrots  Exercise:  Going for a walk daily    Nutrition Diagnosis:      Overweight/Obesity (NC 3.3) related to overeating and poor lifestyle habits as evidenced by patient's report of inconsistent meals, large portions, frequent snacking of sweets, lack of activity and BMI 43.43    Intervention:  1. Food and/or nutrient delivery: discussed alternate sources of protein such as beans, yogurt and cottage cheese to try  2. Nutrition education: reviewed meal planning, encouraged patient to sit down with her daughter to plan meals for the week and to go to the grocery store with a plan and a list- ILS worker will take her to [employer] and [employer] to determine if shopping there will be a better experience  3. Nutrition counseling: motivational interviewing and goal setting    Monitoring/Evaluation:    Goals:  1. Add more variety for protein sources  2. Test blood glucose at different times throughout the day  3. Plan meals with daughter    Patient to follow up in 1 month(s) with RD      Video-Visit Details    Type of service:  Video Visit    Video End Time:9:30a    Originating Location (pt. Location): Home    Distant Location (provider location):  M  [facility] [place]     Platform used for Video Visit: Perri FAULKNER RD

## 2022-02-22 ENCOUNTER — NURSE TRIAGE (OUTPATIENT)
Dept: NURSING | Facility: CLINIC | Age: 37
End: 2022-02-22
Payer: MEDICARE

## 2022-02-23 NOTE — TELEPHONE ENCOUNTER
"Pt states she feels nauseated due to \"hyperosmia\" which she believes is a side effect from her lithium. It appears she is an [facility] patient. Says she cannot sleep because every room in her apartment smells bad \"like cat poop\". Suggested she could try covering the smell up w/ air freshener but she did not like this suggestion. She could go elsewhere but she states she has no transportation.  She has an appointment to discuss this w/ doctor tomorrow. Advised pt we do not have a solution for her hyperosmia tonight. We cannot tell her to stop the Lithium. Advised she discuss this w/ Dr at tomorrow's appt. Or she can try calling her psych MD office.     Reason for Disposition    [1] Caller has NON-URGENT medication question about med that PCP prescribed AND [2] triager unable to answer question    Additional Information    Negative: Drug overdose and triager unable to answer question    Negative: Caller requesting information unrelated to medicine    Negative: Caller requesting a prescription for Strep throat and has a positive culture result    Negative: Rash while taking a medication or within 3 days of stopping it    Negative: Immunization reaction suspected    Negative: [1] Asthma and [2] having symptoms of asthma (cough, wheezing, etc.)    Negative: [1] Influenza symptoms AND [2] anti-viral med prescription request, such as Tamiflu    Negative: [1] Symptom of illness (e.g., headache, abdominal pain, earache, vomiting) AND [2] more than mild    Negative: MORE THAN A DOUBLE DOSE of a prescription or over-the-counter (OTC) drug    Negative: [1] DOUBLE DOSE (an extra dose or lesser amount) of over-the-counter (OTC) drug AND [2] any symptoms (e.g., dizziness, nausea, pain, sleepiness)    Negative: [1] DOUBLE DOSE (an extra dose or lesser amount) of prescription drug AND [2] any symptoms (e.g., dizziness, nausea, pain, sleepiness)    Negative: Took another person's prescription drug    Negative: [1] DOUBLE DOSE (an extra " "dose or lesser amount) of prescription drug AND [2] NO symptoms (Exception: a double dose of antibiotics)    Negative: Diabetes drug error or overdose (e.g., took wrong type of insulin or took extra dose)    Negative: [1] Request for URGENT new prescription or refill of \"essential\" medication (i.e., likelihood of harm to patient if not taken) AND [2] triager unable to fill per unit policy    Negative: [1] Prescription not at pharmacy AND [2] was prescribed by PCP recently    Negative: [1] Pharmacy calling with prescription questions AND [2] triager unable to answer question    Negative: [1] Caller has URGENT medication question about med that PCP or specialist prescribed AND [2] triager unable to answer question    Protocols used: MEDICATION QUESTION CALL-A-AH      "

## 2022-02-24 ENCOUNTER — TELEPHONE (OUTPATIENT)
Dept: FAMILY MEDICINE | Facility: CLINIC | Age: 37
End: 2022-02-24
Payer: MEDICARE

## 2022-02-24 RX ORDER — FLUNISOLIDE 0.25 MG/ML
SOLUTION NASAL
Status: ON HOLD | COMMUNITY
Start: 2022-01-12 | End: 2022-03-03 | Stop reason: SINTOL

## 2022-02-24 NOTE — TELEPHONE ENCOUNTER
Please see telephone encounter from 1/18/22 regarding metformin dosing approval from the MD.  I have only worked with Maddy and communicated with her via phone and [employer]. I am not familiar with [facility].  Is there a consent to communicate with them?   Maddy is correct, we discussed increasing to the full dose of 2000mg metformin     Anna Hampton RD, LD, [facility]  Diabetes Education

## 2022-02-24 NOTE — PROGRESS NOTES
Maddy is a 36 year old who is being evaluated via a billable telephone visit.      What phone number would you like to be contacted at? [phone]  How would you like to obtain your AVS? Saloni    Assessment & Plan     Parosmia  Discussed with patient that her thyroid medication is the only medication that would cause smell issues and this has been pretty persistent for some time so unlikely as cause. I also discussed with her that she could have changes due to her diabetes not being under control. Uncertain labs that are can be done at the [facility] today so recommend request of information be sent over on labs that were drawn. Follow-up with primary care once labs have been sent over so we can reevaluate if symptoms are still improving or if we need to do further labs or referral for ENT.    See Patient Instructions    Return in about 1 week (around 3/4/2022) for Follow up.    Malathi Falcon NP  [facility]    Subjective   Maddy is a 36 year old who presents for the following health issues     HPI     Change in sense of smell    Has been experiencing acute sense of smell.  She feels that this started after her lithium was increased.  She follows with a psychiatrist and is switching to [facility] for her psychiatry care at this time.  Her next appointment  Is March 2nd with psychiatry.  She is going to the [facility] today for lab work for medications like lithium levels but unsure what other tests they are running. She states that the sense of smell had been intense with taking all her medications at night.  Her nurse helped her to organize medications better to take twice daily.  This has improved this a little.  The over sense of smell is discomforting for her.      Review of Systems   CONSTITUTIONAL: NEGATIVE for fever, chills, change in weight  ENT/MOUTH: POSITIVE for heightened sense of smell  RESP: NEGATIVE for significant cough or SOB  CV: NEGATIVE for chest pain, palpitations or  peripheral edema  PSYCHIATRIC: NEGATIVE for changes in mood or affect  ROS otherwise negative      Objective       Vitals:  No vitals were obtained today due to virtual visit.    Physical Exam   healthy, alert and no distress  PSYCH: Alert and oriented times 3; coherent speech, normal   rate and volume, able to articulate logical thoughts, able   to abstract reason, no tangential thoughts, no hallucinations   or delusions  Her affect is normal  RESP: No cough, no audible wheezing, able to talk in full sentences  Remainder of exam unable to be completed due to telephone visits    Phone call duration: 10 minutes

## 2022-02-24 NOTE — TELEPHONE ENCOUNTER
Call from Callie with [facility] transferred to author  Callie attempting to reach the [place] Care Team    Callie requesting update on Metformin dosing instructions   Callie states they have patient taking 2 tablets daily and patient thought she was supposed to be taking 4 tablets daily     Reviewed chart and noted mychart message from Leonardo:  Dr. Ghazala Espinoza was OK with you increasing the metformin (the new prescription was sent to [facility]).  Please take 3 tabs (1500mg) for one week and if tolerating increase to 4 tabs (2000mg).  Because they are extended release you can take all the tabs at dinner and they will have a 24 hour impact.   Can you let me know how blood sugars are and how this is going in a week or two?      Thanks!   Yaa Hampton RD, LD, [facility]  Diabetes Education    Relayed mychart message to Callie Ledesma verbalized understanding   Though, reports patient has not been checking her blood sugars at home     Callie's call back number: [phone]    Will forward to PCP and Leonardo to review    Tim Godinez, RN

## 2022-02-25 ENCOUNTER — VIRTUAL VISIT (OUTPATIENT)
Dept: FAMILY MEDICINE | Facility: CLINIC | Age: 37
End: 2022-02-25
Payer: MEDICARE

## 2022-02-25 DIAGNOSIS — R43.1 PAROSMIA: Primary | ICD-10-CM

## 2022-02-25 PROCEDURE — 99213 OFFICE O/P EST LOW 20 MIN: CPT | Mod: 95 | Performed by: NURSE PRACTITIONER

## 2022-02-25 RX ORDER — RISPERIDONE 0.5 MG/1
0.5 TABLET ORAL 2 TIMES DAILY
Status: ON HOLD | COMMUNITY
Start: 2022-02-23 | End: 2022-03-29

## 2022-02-25 NOTE — TELEPHONE ENCOUNTER
Left message on answering machine for patient to call back.     Called to get verbal consent to speak to home care.    Thank you    Malathi BENSON RN

## 2022-02-25 NOTE — PATIENT INSTRUCTIONS
1.  Get labs at [facility] today and have them sent over to our clinic.  2.  Follow-up with Shirley Freeman for evaluation.  3.  It is important to get diabetes under control since this can affect nerves and possibly senses.  4.  No known smell changes from any of your medications except the levothyroxine in the study I saw.  5.  May need further labs and referral to ENT if persistent symptoms.

## 2022-02-25 NOTE — TELEPHONE ENCOUNTER
I am not sure if there is consent.  Can someone please check on this for the diabetic educator?    Although, that dose of Metformin is on her medication list so I think we can just give them the orders.  Shirley Freeman, CNP

## 2022-02-26 ENCOUNTER — NURSE TRIAGE (OUTPATIENT)
Dept: FAMILY MEDICINE | Facility: CLINIC | Age: 37
End: 2022-02-26
Payer: MEDICARE

## 2022-02-26 NOTE — TELEPHONE ENCOUNTER
"Patient calling about \"mood instability\" related to medication changes.  On Thursday she changed vraylar to risperdal.  Patient states she is having withdrawal of vraylar.  She is having symptoms of depression and paranoia.  Patient very concerned about documentation from [facility]where she receives PCP)  Patient's thoughts were somewhat erratic and hard to follow.  Writer provided supportive listening and encouraged patient to discuss feelings.  She states she feels safe and will not hurt herself.  Patient states her grief regarding the passing of her mother, is more intense. Since medication change  Patient endorses support system, which include father and daughter.  Disposition is home care  Patient verbalized understanding and agrees with plan.     Note to PCP: Patient requesting physical therapy orders for lower and upper back pain.   Writer will route note to PCP/care team.    Alejandra Walters RN  [facility] Nurse Advisor  3:36 PM 2/26/2022      Reason for Disposition    [1] Depression AND [2] NO worsening or change    Additional Information    Negative: Patient attempted suicide    Negative: Violent behavior, or threatening to physically hurt or kill someone    Negative: [1] Patient is very confused (disoriented, slurred speech) AND [2] no other adult (e.g., friend or family member) available    Negative: Patient is threatening suicide now    Negative: [1] Difficult to awaken or acting very confused (disoriented, slurred speech) AND [2] new onset    Negative: Sounds like a life-threatening emergency to the triager    Negative: Alcohol use, abuse or dependence, question or problem related to    Negative: Drug abuse or dependence, question or problem related to    Negative: Bipolar disorder (manic depression)    Negative: Depression during the postpartum period (< 1 year since delivery)    Negative: [1] Depression AND [2] unable to do any of normal activities (e.g., self care, school, work; " in comparison to baseline).    Negative: Patient sounds very sick or weak to the triager    Negative: Very strange or confused behavior    Negative: [1] Depression AND [2] worsening (e.g.,sleeping poorly, less able to do activities of daily living)    Negative: Symptoms interfere with work or school    Negative: Sometimes has thoughts of suicide    Negative: Fever > 101 F (38.3 C)    Negative: Alcohol or drug abuse, known or suspected    Negative: Requesting to talk with a counselor (mental health worker, psychiatrist, etc.)    Negative: [1] Significant weight loss (i.e., > 10 pounds or 5 kg) AND [2] not dieting    Negative: [1] New or changed psychiatric medications > 2 weeks ago AND [2] not feeling any better    Negative: [1] Started on anti-depressant medications < 2 weeks ago AND [2] not feeling any better    Negative: Feels depressed only on days just before menstrual period    Negative: Pregnant    Protocols used: DEPRESSION-A-AH  COVID 19 Nurse Triage Plan/Patient Instructions    Please be aware that novel coronavirus (COVID-19) may be circulating in the community. If you develop symptoms such as fever, cough, or SOB or if you have concerns about the presence of another infection including coronavirus (COVID-19), please contact your health care provider or visit [email or web address].     Disposition/Instructions    Home care recommended. Follow home care protocol based instructions.    Thank you for taking steps to prevent the spread of this virus.  o Limit your contact with others.  o Wear a simple mask to cover your cough.  o Wash your hands well and often.    Resources    [facility]: About COVID-19: [email or web address]    CDC: What to Do If You're Sick: www.cdc.gov/coronavirus/2019-ncov/about/steps-when-sick.html    CDC: Ending Home Isolation: www.cdc.gov/coronavirus/2019-ncov/hcp/disposition-in-home-patients.html     CDC: Caring for Someone:  www.cdc.gov/coronavirus/2019-ncov/if-you-are-sick/care-for-someone.html     [facility]: Interim Guidance for Hospital Discharge to Home: www.health.[facility].[place]/diseases/coronavirus/hcp/hospdischarge.pdf    [facility] clinical trials (COVID-19 research studies): clinicalaffairs.[facility]-clinical-trials     Below are the COVID-19 hotlines at the [place] [facility] ([facility]). Interpreters are available.   o For health questions: Call 519-123-7337 or 1-948.983.2319 (7 a.m. to 7 p.m.)  o For questions about schools and childcare: Call 034-953-2454 or 1-237.618.8314 (7 a.m. to 7 p.m.)

## 2022-02-28 DIAGNOSIS — G25.81 RESTLESS LEGS SYNDROME (RLS): ICD-10-CM

## 2022-02-28 DIAGNOSIS — Z13.220 SCREENING FOR LIPID DISORDERS: ICD-10-CM

## 2022-02-28 DIAGNOSIS — F20.9 SCHIZOPHRENIA (H): Primary | ICD-10-CM

## 2022-02-28 DIAGNOSIS — E11.9 DIABETES MELLITUS (H): ICD-10-CM

## 2022-02-28 DIAGNOSIS — F33.1 MAJOR DEPRESSIVE DISORDER, RECURRENT EPISODE, MODERATE (H): ICD-10-CM

## 2022-02-28 DIAGNOSIS — D64.9 ANEMIA, UNSPECIFIED: Primary | ICD-10-CM

## 2022-02-28 NOTE — TELEPHONE ENCOUNTER
Reached out to patient again and left general VM with instruction from PCP to f/u with psychiatrist this week.   Advised patient can call back at [phone] with any further questions/concerns.     Chiquis Roe RN  [facility]

## 2022-02-28 NOTE — TELEPHONE ENCOUNTER
Yes you are correct - patient has a psychiatrist and should follow up with that provider this week.  Shirley Freeman, CNP

## 2022-02-28 NOTE — TELEPHONE ENCOUNTER
Shirley,    I did attempt to call this patient and got her voicemail.  I did leave a message to call us back.  However, we do not manage her psych meds.  Please advise. Nicci PONCE RN

## 2022-03-01 ENCOUNTER — MYC MEDICAL ADVICE (OUTPATIENT)
Dept: FAMILY MEDICINE | Facility: CLINIC | Age: 37
End: 2022-03-01
Payer: MEDICARE

## 2022-03-01 NOTE — TELEPHONE ENCOUNTER
Left non-detailed message for patient to return a call to the clinic RN.       Pt is active in [employer] account.    Message sent to marty.    SHIRA Quinn RN

## 2022-03-03 ENCOUNTER — APPOINTMENT (OUTPATIENT)
Dept: CT IMAGING | Facility: CLINIC | Age: 37
End: 2022-03-03
Attending: EMERGENCY MEDICINE
Payer: MEDICARE

## 2022-03-03 ENCOUNTER — TELEPHONE (OUTPATIENT)
Dept: BEHAVIORAL HEALTH | Facility: CLINIC | Age: 37
End: 2022-03-03

## 2022-03-03 ENCOUNTER — HOSPITAL ENCOUNTER (EMERGENCY)
Facility: CLINIC | Age: 37
Discharge: PSYCHIATRIC OR SUBSTANCE USE FACILITY | End: 2022-03-03
Attending: EMERGENCY MEDICINE | Admitting: EMERGENCY MEDICINE
Payer: MEDICARE

## 2022-03-03 ENCOUNTER — HOSPITAL ENCOUNTER (INPATIENT)
Facility: CLINIC | Age: 37
LOS: 27 days | Discharge: HOME | DRG: 885 | End: 2022-03-30
Attending: PSYCHIATRY & NEUROLOGY | Admitting: PSYCHIATRY & NEUROLOGY
Payer: MEDICARE

## 2022-03-03 VITALS
RESPIRATION RATE: 22 BRPM | TEMPERATURE: 98.4 F | HEART RATE: 101 BPM | OXYGEN SATURATION: 97 % | SYSTOLIC BLOOD PRESSURE: 147 MMHG | BODY MASS INDEX: 45.24 KG/M2 | DIASTOLIC BLOOD PRESSURE: 102 MMHG | WEIGHT: 265 LBS | HEIGHT: 64 IN

## 2022-03-03 DIAGNOSIS — F25.0 SCHIZOAFFECTIVE DISORDER, BIPOLAR TYPE (H): ICD-10-CM

## 2022-03-03 DIAGNOSIS — R20.0 NUMBNESS AND TINGLING OF BOTH LEGS: ICD-10-CM

## 2022-03-03 DIAGNOSIS — R10.33 PERIUMBILICAL ABDOMINAL PAIN: ICD-10-CM

## 2022-03-03 DIAGNOSIS — M79.672 PAIN IN BOTH FEET: ICD-10-CM

## 2022-03-03 DIAGNOSIS — R06.02 SHORTNESS OF BREATH: ICD-10-CM

## 2022-03-03 DIAGNOSIS — R79.89 ELEVATED LFTS: ICD-10-CM

## 2022-03-03 DIAGNOSIS — J30.1 SEASONAL ALLERGIC RHINITIS DUE TO POLLEN: Primary | ICD-10-CM

## 2022-03-03 DIAGNOSIS — E03.8 OTHER SPECIFIED HYPOTHYROIDISM: ICD-10-CM

## 2022-03-03 DIAGNOSIS — B35.3 TINEA PEDIS OF BOTH FEET: ICD-10-CM

## 2022-03-03 DIAGNOSIS — R20.2 NUMBNESS AND TINGLING OF BOTH LEGS: ICD-10-CM

## 2022-03-03 DIAGNOSIS — M79.671 PAIN IN BOTH FEET: ICD-10-CM

## 2022-03-03 DIAGNOSIS — E11.9 TYPE 2 DIABETES, HBA1C GOAL < 7% (H): ICD-10-CM

## 2022-03-03 LAB
ALBUMIN SERPL-MCNC: 3.7 G/DL (ref 3.4–5)
ALBUMIN UR-MCNC: 100 MG/DL
ALP SERPL-CCNC: 102 U/L (ref 40–150)
ALT SERPL W P-5'-P-CCNC: 116 U/L (ref 0–50)
ANION GAP SERPL CALCULATED.3IONS-SCNC: 7 MMOL/L (ref 3–14)
APPEARANCE UR: ABNORMAL
AST SERPL W P-5'-P-CCNC: 149 U/L (ref 0–45)
BACTERIA #/AREA URNS HPF: ABNORMAL /HPF
BASOPHILS # BLD AUTO: 0.1 10E3/UL (ref 0–0.2)
BASOPHILS NFR BLD AUTO: 1 %
BILIRUB SERPL-MCNC: 0.5 MG/DL (ref 0.2–1.3)
BILIRUB UR QL STRIP: NEGATIVE
BUN SERPL-MCNC: 10 MG/DL (ref 7–30)
CALCIUM SERPL-MCNC: 9.5 MG/DL (ref 8.5–10.1)
CHLORIDE BLD-SCNC: 106 MMOL/L (ref 94–109)
CO2 SERPL-SCNC: 24 MMOL/L (ref 20–32)
COLOR UR AUTO: YELLOW
CREAT SERPL-MCNC: 0.74 MG/DL (ref 0.52–1.04)
EOSINOPHIL # BLD AUTO: 0.4 10E3/UL (ref 0–0.7)
EOSINOPHIL NFR BLD AUTO: 3 %
ERYTHROCYTE [DISTWIDTH] IN BLOOD BY AUTOMATED COUNT: 12.5 % (ref 10–15)
GFR SERPL CREATININE-BSD FRML MDRD: >90 ML/MIN/1.73M2
GLUCOSE BLD-MCNC: 200 MG/DL (ref 70–99)
GLUCOSE BLDC GLUCOMTR-MCNC: 194 MG/DL (ref 70–99)
GLUCOSE UR STRIP-MCNC: NEGATIVE MG/DL
HCG SERPL QL: NEGATIVE
HCT VFR BLD AUTO: 40.1 % (ref 35–47)
HGB BLD-MCNC: 13.2 G/DL (ref 11.7–15.7)
HGB UR QL STRIP: ABNORMAL
HOLD SPECIMEN: NORMAL
HYALINE CASTS: 1 /LPF
IMM GRANULOCYTES # BLD: 0.1 10E3/UL
IMM GRANULOCYTES NFR BLD: 1 %
KETONES UR STRIP-MCNC: NEGATIVE MG/DL
LEUKOCYTE ESTERASE UR QL STRIP: ABNORMAL
LIPASE SERPL-CCNC: 78 U/L (ref 73–393)
LITHIUM SERPL-SCNC: 0.4 MMOL/L
LYMPHOCYTES # BLD AUTO: 2.1 10E3/UL (ref 0.8–5.3)
LYMPHOCYTES NFR BLD AUTO: 17 %
MCH RBC QN AUTO: 30.2 PG (ref 26.5–33)
MCHC RBC AUTO-ENTMCNC: 32.9 G/DL (ref 31.5–36.5)
MCV RBC AUTO: 92 FL (ref 78–100)
MONOCYTES # BLD AUTO: 0.7 10E3/UL (ref 0–1.3)
MONOCYTES NFR BLD AUTO: 6 %
MUCOUS THREADS #/AREA URNS LPF: PRESENT /LPF
NEUTROPHILS # BLD AUTO: 8.7 10E3/UL (ref 1.6–8.3)
NEUTROPHILS NFR BLD AUTO: 72 %
NITRATE UR QL: NEGATIVE
NRBC # BLD AUTO: 0 10E3/UL
NRBC BLD AUTO-RTO: 0 /100
PH UR STRIP: 5 [PH] (ref 5–7)
PLATELET # BLD AUTO: 275 10E3/UL (ref 150–450)
POTASSIUM BLD-SCNC: 3.9 MMOL/L (ref 3.4–5.3)
PROT SERPL-MCNC: 7.6 G/DL (ref 6.8–8.8)
RBC # BLD AUTO: 4.37 10E6/UL (ref 3.8–5.2)
RBC URINE: 2 /HPF
SARS-COV-2 RNA RESP QL NAA+PROBE: NEGATIVE
SODIUM SERPL-SCNC: 137 MMOL/L (ref 133–144)
SP GR UR STRIP: 1.02 (ref 1–1.03)
SQUAMOUS EPITHELIAL: 7 /HPF
UROBILINOGEN UR STRIP-MCNC: NORMAL MG/DL
WBC # BLD AUTO: 12.1 10E3/UL (ref 4–11)
WBC URINE: 14 /HPF

## 2022-03-03 PROCEDURE — 250N000013 HC RX MED GY IP 250 OP 250 PS 637: Performed by: PSYCHIATRY & NEUROLOGY

## 2022-03-03 PROCEDURE — 87086 URINE CULTURE/COLONY COUNT: CPT | Performed by: EMERGENCY MEDICINE

## 2022-03-03 PROCEDURE — 81001 URINALYSIS AUTO W/SCOPE: CPT | Performed by: EMERGENCY MEDICINE

## 2022-03-03 PROCEDURE — C9803 HOPD COVID-19 SPEC COLLECT: HCPCS | Performed by: EMERGENCY MEDICINE

## 2022-03-03 PROCEDURE — 99285 EMERGENCY DEPT VISIT HI MDM: CPT | Mod: 25 | Performed by: EMERGENCY MEDICINE

## 2022-03-03 PROCEDURE — 85025 COMPLETE CBC W/AUTO DIFF WBC: CPT | Performed by: EMERGENCY MEDICINE

## 2022-03-03 PROCEDURE — 87635 SARS-COV-2 COVID-19 AMP PRB: CPT | Performed by: EMERGENCY MEDICINE

## 2022-03-03 PROCEDURE — 80053 COMPREHEN METABOLIC PANEL: CPT | Performed by: EMERGENCY MEDICINE

## 2022-03-03 PROCEDURE — 83690 ASSAY OF LIPASE: CPT | Performed by: EMERGENCY MEDICINE

## 2022-03-03 PROCEDURE — 36415 COLL VENOUS BLD VENIPUNCTURE: CPT | Performed by: EMERGENCY MEDICINE

## 2022-03-03 PROCEDURE — 84703 CHORIONIC GONADOTROPIN ASSAY: CPT | Performed by: EMERGENCY MEDICINE

## 2022-03-03 PROCEDURE — 250N000011 HC RX IP 250 OP 636: Performed by: EMERGENCY MEDICINE

## 2022-03-03 PROCEDURE — 80178 ASSAY OF LITHIUM: CPT | Performed by: EMERGENCY MEDICINE

## 2022-03-03 PROCEDURE — 250N000013 HC RX MED GY IP 250 OP 250 PS 637: Performed by: NURSE PRACTITIONER

## 2022-03-03 PROCEDURE — 74177 CT ABD & PELVIS W/CONTRAST: CPT | Mod: MG

## 2022-03-03 PROCEDURE — 124N000002 HC R&B MH UMMC

## 2022-03-03 PROCEDURE — 250N000009 HC RX 250: Performed by: EMERGENCY MEDICINE

## 2022-03-03 PROCEDURE — 99285 EMERGENCY DEPT VISIT HI MDM: CPT | Performed by: EMERGENCY MEDICINE

## 2022-03-03 PROCEDURE — 90791 PSYCH DIAGNOSTIC EVALUATION: CPT

## 2022-03-03 RX ORDER — ACETAMINOPHEN 325 MG/1
650 TABLET ORAL EVERY 4 HOURS PRN
Status: DISCONTINUED | OUTPATIENT
Start: 2022-03-03 | End: 2022-03-30 | Stop reason: HOSPADM

## 2022-03-03 RX ORDER — METFORMIN HCL 500 MG
1000 TABLET, EXTENDED RELEASE 24 HR ORAL 2 TIMES DAILY WITH MEALS
Status: DISCONTINUED | OUTPATIENT
Start: 2022-03-03 | End: 2022-03-30 | Stop reason: HOSPADM

## 2022-03-03 RX ORDER — MAGNESIUM HYDROXIDE/ALUMINUM HYDROXICE/SIMETHICONE 120; 1200; 1200 MG/30ML; MG/30ML; MG/30ML
30 SUSPENSION ORAL EVERY 4 HOURS PRN
Status: DISCONTINUED | OUTPATIENT
Start: 2022-03-03 | End: 2022-03-30 | Stop reason: HOSPADM

## 2022-03-03 RX ORDER — NAPROXEN 500 MG/1
500 TABLET ORAL 2 TIMES DAILY PRN
Status: ON HOLD | COMMUNITY
End: 2022-03-03

## 2022-03-03 RX ORDER — EPINEPHRINE 0.3 MG/.3ML
0.3 INJECTION SUBCUTANEOUS PRN
COMMUNITY
End: 2022-08-30

## 2022-03-03 RX ORDER — IBUPROFEN 600 MG/1
600 TABLET, FILM COATED ORAL EVERY 6 HOURS PRN
Status: DISCONTINUED | OUTPATIENT
Start: 2022-03-03 | End: 2022-03-03

## 2022-03-03 RX ORDER — IOPAMIDOL 755 MG/ML
100 INJECTION, SOLUTION INTRAVASCULAR ONCE
Status: COMPLETED | OUTPATIENT
Start: 2022-03-03 | End: 2022-03-03

## 2022-03-03 RX ORDER — OLANZAPINE 10 MG/1
10 TABLET ORAL 3 TIMES DAILY PRN
Status: DISCONTINUED | OUTPATIENT
Start: 2022-03-03 | End: 2022-03-20

## 2022-03-03 RX ORDER — AMOXICILLIN 250 MG
1 CAPSULE ORAL 2 TIMES DAILY PRN
Status: DISCONTINUED | OUTPATIENT
Start: 2022-03-03 | End: 2022-03-30 | Stop reason: HOSPADM

## 2022-03-03 RX ORDER — OLANZAPINE 10 MG/2ML
10 INJECTION, POWDER, FOR SOLUTION INTRAMUSCULAR 3 TIMES DAILY PRN
Status: DISCONTINUED | OUTPATIENT
Start: 2022-03-03 | End: 2022-03-20

## 2022-03-03 RX ORDER — HYDROXYZINE HYDROCHLORIDE 25 MG/1
1 TABLET, FILM COATED ORAL
Status: ON HOLD | COMMUNITY
Start: 2022-02-23 | End: 2022-03-29

## 2022-03-03 RX ORDER — MINERAL OIL/HYDROPHIL PETROLAT
OINTMENT (GRAM) TOPICAL DAILY PRN
COMMUNITY
End: 2022-12-31

## 2022-03-03 RX ORDER — HYDROXYZINE HYDROCHLORIDE 25 MG/1
25 TABLET, FILM COATED ORAL EVERY 4 HOURS PRN
Status: DISCONTINUED | OUTPATIENT
Start: 2022-03-03 | End: 2022-03-30 | Stop reason: HOSPADM

## 2022-03-03 RX ORDER — ONDANSETRON 4 MG/1
4 TABLET, ORALLY DISINTEGRATING ORAL EVERY 6 HOURS PRN
Status: DISCONTINUED | OUTPATIENT
Start: 2022-03-03 | End: 2022-03-30 | Stop reason: HOSPADM

## 2022-03-03 RX ADMIN — HYDROXYZINE HYDROCHLORIDE 25 MG: 25 TABLET, FILM COATED ORAL at 22:09

## 2022-03-03 RX ADMIN — IOPAMIDOL 100 ML: 755 INJECTION, SOLUTION INTRAVENOUS at 10:03

## 2022-03-03 RX ADMIN — SODIUM CHLORIDE 72 ML: 9 INJECTION, SOLUTION INTRAVENOUS at 10:03

## 2022-03-03 RX ADMIN — METFORMIN HYDROCHLORIDE 1000 MG: 500 TABLET, EXTENDED RELEASE ORAL at 22:09

## 2022-03-03 ASSESSMENT — ACTIVITIES OF DAILY LIVING (ADL)
HYGIENE/GROOMING: INDEPENDENT
ORAL_HYGIENE: INDEPENDENT
DRESS: INDEPENDENT

## 2022-03-03 ASSESSMENT — PATIENT HEALTH QUESTIONNAIRE - PHQ9: SUM OF ALL RESPONSES TO PHQ9 QUESTIONS 1 & 2: 2

## 2022-03-03 NOTE — PROGRESS NOTES
"Maddy Ortiz  March 3, 2022  SAFE Note    Critical Safety Issues: Pt reported suicidal thoughts, statements about being hopeless and that her family would be better off with out her. Pt denied thoughts of suicidal plans or intent, pt reported \" I am afraid of myself and If I would harm myself\". Pt reported olfactory hallucinations that have been worsening over the last several months. She reports her daughter whom she lives with reports what the pt is smelling is not there. Pt denied visual or auditory hallucinations. Pt reported to nursing staff \"I can feel my intestines moving in and out of my hernia\" in regards to abdominal pain she is reporting.        Current Suicidal Ideation/Self-Injurious Concerns/Methods: Other pt reporting she is afraid she cannot keep herself safe, worried about hallucinations and \"thoughts and delusions\", denied thoughts of specific plans       Current or Historical Inappropriate Sexual Behavior: No      Current or Historical Aggression/Homicidal Ideation: None - N/A      Triggers: depression, hallucinations, anxiety, interpersonal stressors     Updated care team: Yes: MD and intake notified     For additional details see full [facility] assessment.       CARLOS ALBERTO Patricio        "

## 2022-03-03 NOTE — ED NOTES
Pt presents to ED with c/o abdominal pain. Pt states hx of umbilical hernia since she had her daughter. Pt states pain/hernia has been worse over past couple months and much more painful over past 3 days. Pt states nauseated but unsure if its from change in medications.

## 2022-03-03 NOTE — ED NOTES
3/3/2022  Maddy Ortiz 1985     [facility] Crisis Assessment    Patient was assessed: remote  Patient location: [facility] ED    Referral Data and Chief Complaint  Maddy is a 36 year old who uses she/her pronouns. Patient presented to the ED with family/friends and was referred to the ED by self. Patient is presenting to the ED for the following concerns: abdominal pain and mental health concerns.      Informed Consent and Assessment Methods    Patient is her own guardian. Writer met with patient and explained the crisis assessment process, including applicable information disclosures and limits to confidentiality, assessed understanding of the process, and obtained consent to proceed with the assessment. Patient was observed to be able to participate in the assessment as evidenced by pt was alert and oriented during assessment . Assessment methods included conducting a formal interview with patient, review of medical records, collaboration with medical staff, and obtaining relevant collateral information from family and community providers when available.    Narrative Summary of Presenting Problem and Current Functioning  What led to the patient presenting for crisis services, factors that make the crisis life threatening or complex, stressors, how is this disrupting the patient's life, and how current functioning is in comparison to baseline. How is patient presenting during the assessment.     Pt reports her friend dropped her off at the ED today. She reports abdominal pain related to a hernia and concerns for her mental health. She reports olfactory hallucinations, symptoms of depression and anxiety and suicidal ideation. She reports her mental health has been decompensating for months and reports in January she was medically admitted but never transferred to psychiatric care ( see records in Epic 1/8/22). Pt was cooperative during assessment; pt kept her face covered for duration of assessment and never made eye  "contact with .    History of the Crisis  Duration of the current crisis, coping skills attempted to reduce the crisis, community resources used, and past presentations.    Pt reports she has been wanting to come to the ED for the last several days. She reports concerns about her hallucinations and \"thoughts and delusions\" for the last several months. Reports it has been disruptive to her sleep and her daughters life whom shares an apartment with her. Pt was tearful throughout assessment. Pt reports she has a psychiatrist, ILS worker , home nurse and DBT therapist. Pt was unsure on dates,names, and locations of all of her outpatient providers during assessment. Pt reported medication changes last week.     Collateral Information  Voicemail left for pts father at 10:48 AM 3/3/2022    Risk Assessment    Risk of Harm to Self     ESS-6  1.a. Over the past 2 weeks, have you had thoughts of killing yourself? Yes  1.b. Have you ever attempted to kill yourself and, if yes, when did this last happen? No   2. Recent or current suicide plan? No   3. Recent or current intent to act on ideation? No  4. Lifetime psychiatric hospitalization? Yes  5. Pattern of excessive substance use? No  6. Current irritability, agitation, or aggression? Yes  Scoring note: BOTH 1a and 1b must be yes for it to score 1 point, if both are not yes it is zero. All others are 1 point per number. If all questions 1a/1b - 6 are no, risk is negligible. If one of 1a/1b is yes, then risk is mild. If either question 2 or 3, but not both, is yes, then risk is automatically moderate regardless of total score. If both 2 and 3 are yes, risk is automatically high regardless of total score.     Score: 2, moderate risk    The patient has the following risk factors for suicide: depressive symptoms, health stressors, chronic pain, lack of support, psychosis, significant behavioral changes, restless/agitated and family disruption    Is the patient experiencing " current suicidal ideation: Yes. Thoughts to kill self with no plan or intent    Is the patient engaging in preparatory suicide behaviors (formulating how to act on plan, giving away possessions, saying goodbye, displaying dramatic behavior changes, etc)? No    Does the patient have access to firearms or other lethal means? no    The patient has the following protective factors: voluntarily seeking mental health support, established relationship community mental health provider(s), future focused thinking, sense of obligation to people/pets and safe/stable housing    Support system information: family and outpatient providers     Patient strengths: willing to share thoughts and feelings    Does the patient engage in non-suicidal self-injurious behavior (NSSI/SIB)? no    Is the patient vulnerable to sexual exploitation?  No    Is the patient experiencing abuse or neglect? no    Is the patient a vulnerable adult? No      Risk of Harm to Others  The patient has the following risk factors of harm to others: no risk factors identified    Does the patient have thoughts of harming others? No    Is the patient engaging in sexually inappropriate behavior?  no       Current Substance Abuse    Is there recent substance abuse? no    Was a urine drug screen or blood alcohol level obtained: No          Current Symptoms/Concerns    Symptoms  Attention, hyperactivity, and impulsivity symptoms present: No    Anxiety symptoms present: Yes: Generalized Symptoms: Agitation, Avoidance, Cognitive anxiety - feelings of doom, racing thoughts, difficulty concentrating  and Excessive worry      Appetite symptoms present: No     Behavioral difficulties present: Yes: Agitation, Impulsivity/Disinhibition and Withdrawal/Isolation      Cognitive impairment symptoms present: No    Depressive symptoms present: Yes Crying or feels like crying, Depressed mood, Feelings of hopelessness , Feelings of worthlessness , Impaired concentration, Isolative ,  Sleep disturbance  and Thoughts of suicide/death      Eating disorder symptoms present: No    Learning disabilities, cognitive challenges, and/or developmental disorder symptoms present: No     Manic/hypomanic symptoms present: No    Personality and interpersonal functioning difficulties present : No    Psychosis symptoms present: Yes: Hallucinations: Olfactory, Delusions: Paranoid: pt reports fear she will do something bad and that bad things will happen to people in her life and Paranoia      Sleep difficulties present: Yes: Difficulty falling asleep  and Difficulty staying sleep     Substance abuse disorder symptoms present: No     Trauma and stressor related symptoms present: No           Mental Status Exam   Affect: Appropriate   Appearance: Disheveled    Attention Span/Concentration: Attentive?    Eye Contact: Avoidant   Fund of Knowledge: Delayed    Language /Speech Content: Fluent   Language /Speech Volume: Soft    Language /Speech Rate/Productions: Normal    Recent Memory: Variable   Remote Memory: Variable   Mood: Anxious and Sad    Orientation to Person: Yes    Orientation to Place: Yes   Orientation to Time of Day: Yes    Orientation to Date: Yes    Situation (Do they understand why they are here?): Yes    Psychomotor Behavior: Normal    Thought Content: Hallucinations and Suicidal   Thought Form: Intact       Mental Health and Substance Abuse History    History  Current and historical diagnoses or mental health concerns: schizoaffective disorder, anxiety and depression    Prior MH services (inpatient, programmatic care, outpatient, etc) : Yes history of multiple inpatient admissions and current outpatient therapy and medication management     Has the patient used [facility] crisis team services before?: Yes [place] [facility] [place] distressed [place]    History of substance abuse: No    Prior ALEIDA services (inpatient, programmatic care, detox, outpatient, etc) : No    History of  commitment: No    Family history of MH/ALEIDA: No    Trauma history: No    Medication  Psychotropic medications: Yes. Pt is currently taking medications listed in epic. Medication compliant: No: pt reports missing dosage last night and has home nurse who helps with medications. Recent medication changes: Yes pt reports starting Risperdal last week    Current Care Team  Primary Care Provider: No    Psychiatrist: Yes. Name: pt did not know; previous records indicate [facility] . Location: n/a. Date of last visit: n/a. Frequency: n/a. Perceived helpfulness: n/a.    Therapist: Yes. Name: DBT program once weekly; pt did not know name. Location: [facility] . Date of last visit: Tuesdays. Frequency: weekly. Perceived helpfulness: helpful.    : No    CTSS or ARMHS: No    ACT Team: No    Other: No    Release of Information  Was a release of information signed: No. Reason: pt going inpatient       Biopsychosocial Information    Socioeconomic Information  Current living situation: pt reports living in apartment with her teenage daughter     Employment/income source: none reported by pt     Relevant legal issues: none reported      Cultural, Pentecostalism, or spiritual influences on mental health care:  Pt reported some of her family does not understand mental health and do not feel she needs to be treated at the hospital     Is the patient active in the  or a : No      Relevant Medical Concerns   Patient identifies concerns with completing ADLs? No     Patient can ambulate independently? Yes     Other medical concerns? Yes     History of concussion or TBI? No        Diagnosis  Schizoaffective disorder, Bipolar type F25.0  By history    Other specified anxiety disorder F41.8    Provisional        Therapeutic Intervention  The following therapeutic methodologies were employed when working with the patient: establishing rapport, active listening, assessing dimensions of crisis, psychoeducation,  "motivational interviewing and treatment planning. Patient response to intervention: pt was cooperative with assessment; pt avoided eye contact for duration of assessment.      Disposition  Recommended disposition: Inpatient Mental Health      Reviewed case and recommendations with attending provider. Attending Name:       Attending concurs with disposition: Yes      Patient concurs with disposition: Yes      Guardian concurs with disposition: NA     Final disposition: Inpatient mental health .     Inpatient Details (if applicable):  Is patient admitted voluntarily:Yes    Patient aware of potential for transfer if there is not appropriate placement? NA     Patient is willing to travel outside of the [facility] for placement? NA      Behavioral Intake Notified? Yes: Date: 3/3/2022 Time: 11AM .       Clinical Substantiation of Recommendations   Rationale with supporting factors for disposition and diagnosis.     Pt is 36 year old with history of schizoaffective disorder,bipolar type. Pt was alert and oriented during assessment. Pt did not make eye contact with  during assessment and kept her arm over her face the entire time. Pt was cooperative and did answer all questions asked. Pt denied substance use, SIB and HI. Pt reported drinking energy drinks in the morning due to fatigue. Pt reported disrupted sleep. Denied concerns with appetite. Pt reported suicidal thoughts, statements about being hopeless and that her family would be better off with out her. Pt denied thoughts of suicidal plans or intent, pt reported \" I am afraid of myself and If I would harm myself\". Pt reported olfactory hallucinations that have been worsening over the last several months. She reports her daughter whom she lives with reports what the pt is smelling is not there. Pt denied visual or auditory hallucinations. Pt reported to nursing staff \"I can feel my intestines moving in and out of my hernia\" in regards to abdominal pain she " is reporting.  Pt reports outpatient care team and recent medication changes; started Risperdal last week. Pt recommended for inpatient admission for safety, stabilization and mediation management as needed.       Assessment Details  Patient interview started at: 10:19 AM and completed at: 10:44 AM.    Total duration spent on the patient case in minutes: 2.0 hrs     CPT code(s) utilized: 55990 - Psychotherapy for Crisis - 60 (30-74*) min       Aftercare and Safety Planning  Follow up plans with MH/ALEIDA services: No      Aftercare plan placed in the AVS and provided to patient: No. Rationale: pt going inpatient     CARLOS ALBERTO Patricio

## 2022-03-03 NOTE — TELEPHONE ENCOUNTER
S:  ULISES Kilgore called at 10:58a with 36y/F in [facility] ED with SI, paranoia and delusions.    B:  Pt presents initially reporting abdominal pain, but also disclosed SI, depression, delusions of olfactory - reporting smells that are not present.   Pt only reporting Olfactory hallucination and denies AH or VH .   Pt has a dx hx of Schizoaffective DO - Bipolat type, Depression and anxiety.   Pt is a poor historian.   Pt reports paranoia that she is fearful something might happen and worried she will kill self.    ER  completed CT  R/t to hernia and complain of abdomina claim.  Results unremarkable.  Pt pain reports do not corollate with abdomina and possibly part of her delusions.       A:  Vol    R:  Patient cleared and ready for behavioral bed placement: Yes   Provider paged @11:39am to present for Manish/Lisa Reeves called back @ 12:40pm   Priscilla requested Doc to Doc [facility] DR nKott   DOC to DOC initiated @ 12:42pm  MIRIAM Reeves accepted for 32/Jonny @ 12:49pm  Pt placed in unit que and charge given disposition @ 12:50pm  Charge to call Intake back  ED Updated with placement @ 1:06pm

## 2022-03-03 NOTE — ED PROVIDER NOTES
History     Chief Complaint   Patient presents with     Abdominal Pain     Mental Health Problem     HPI  Maddy Ortiz is a 36 year old female with a past medical history significant for schizoaffective disorder bipolar type with history of diabetes type 2 and an umbilical hernia who presents the emergency department complaining of abdominal pain and difficulty thinking status post med change.  Patient states she has had an umbilical hernia for some time and now is feeling more distended over the last few days she has had increasing abdominal pain around the hernia and feels bloated.  Pain is worsened with touching.  She has had some mild nausea but no vomiting.  She denies any blood in her stool or diarrhea she is not any fevers or chills denies any headache or visual changes she has not had any neck pain or chest pain denies any shortness of breath she has not had any bowel or bladder dysfunction.  Currently rates her pain a 2 out of 10.  Patient also complains about recently being started on Risperdal and stopping another medication and having difficulty with her thoughts.  Cyst states she is unorganized at times and cannot get her thoughts together stating she wonders if she is manic at this time.  She denies any suicidal or homicidal ideation at this time.  She feels like she needs to talk to somebody.  Allergies:  Allergies   Allergen Reactions     Dust Mites Shortness Of Breath     Animal Dander      Other reaction(s): *Unknown     Metformin Fatigue     Mold      Other reaction(s): Runny Nose     Trees        Problem List:    Patient Active Problem List    Diagnosis Date Noted     Elevated LFTs 01/08/2022     Priority: Medium     Disorganized behavior 01/08/2022     Priority: Medium     Infection due to 2019 novel coronavirus 01/08/2022     Priority: Medium     Diabetes mellitus, type 2 (H) 12/13/2021     Priority: Medium     Fatty liver 11/05/2021     Priority: Medium     Umbilical hernia without  obstruction and without gangrene 10/26/2021     Priority: Medium     Schizoaffective disorder (H) 07/13/2021     Priority: Medium     Morbid obesity (H) 01/02/2019     Priority: Medium     Paranoia (psychosis) (H) 08/24/2018     Priority: Medium     Gastroesophageal reflux disease without esophagitis 06/08/2018     Priority: Medium     Schizoaffective disorder, bipolar type (H) 05/07/2018     Priority: Medium     Encounter for IUD insertion 09/15/2017     Priority: Medium     inserted 9/15/17 by Krista Keller MD    LOT: [id number]  Exp: 04/20       Seasonal allergic rhinitis due to pollen 11/04/2016     Priority: Medium     Allergic rhinitis due to mold 11/04/2016     Priority: Medium     Allergic rhinitis due to animal dander 11/04/2016     Priority: Medium     Allergic rhinitis due to dust mite 11/04/2016     Priority: Medium     Depression with anxiety 01/26/2015     Priority: Medium     Insomnia 07/18/2013     Priority: Medium     Bipolar 1 disorder (H) 12/06/2012     Priority: Medium     Planning on seeing Purvi (psychiatric nurse)       Paranoid type delusional disorder (H) 11/14/2012     Priority: Medium     Psychosis (H) 10/16/2012     Priority: Medium     Animal dander allergy 05/09/2012     Priority: Medium     Dog and Cat       CARDIOVASCULAR SCREENING; LDL GOAL LESS THAN 160 05/09/2012     Priority: Medium        Past Medical History:    Past Medical History:   Diagnosis Date     Bipolar 1 disorder (H) 12/6/2012     Depressive disorder      Diabetes mellitus, type 2 (H) 12/13/2021     Paranoid type delusional disorder (H) 11/14/2012       Past Surgical History:    Past Surgical History:   Procedure Laterality Date     TONSILLECTOMY & ADENOIDECTOMY      as a child     TONSILLECTOMY & ADENOIDECTOMY         Family History:    Family History   Adopted: Yes   Problem Relation Age of Onset     Unknown/Adopted Mother      Unknown/Adopted Father      Unknown/Adopted Other      Hypertension Sister   "      Social History:  Marital Status:  Single [1]  Social History     Tobacco Use     Smoking status: Never Smoker     Smokeless tobacco: Never Used     Tobacco comment: around 2nd hand smoke   Vaping Use     Vaping Use: Never used   Substance Use Topics     Alcohol use: Not Currently     Drug use: Not Currently        Medications:    albuterol (PROAIR HFA/PROVENTIL HFA/VENTOLIN HFA) 108 (90 Base) MCG/ACT inhaler  ammonium lactate (LAC-HYDRIN) 12 % external cream  benztropine (COGENTIN) 0.5 MG tablet  flunisolide (NASALIDE) 25 MCG/ACT (0.025%) SOLN spray  gabapentin (NEURONTIN) 300 MG capsule  hydrOXYzine (ATARAX) 10 MG tablet  levonorgestrel-ethinyl estradiol (AVIANE) 0.1-20 MG-MCG tablet  levothyroxine (SYNTHROID/LEVOTHROID) 25 MCG tablet  lithium ER (LITHOBID) 300 MG CR tablet  Melatonin 10 MG TABS tablet  metFORMIN (GLUCOPHAGE-XR) 500 MG 24 hr tablet  risperiDONE (RISPERDAL) 0.5 MG tablet  terbinafine (LAMISIL AT) 1 % external cream  VRAYLAR 6 MG CAPS capsule          Review of Systems  All systems reviewed and other than pertinent positives and negatives in HPI all other systems are negative.  Physical Exam   BP: (!) 162/116  Pulse: 101  Temp: 98.4  F (36.9  C)  Resp: 22  Height: 162.6 cm (5' 4\")  Weight: 120.2 kg (265 lb)  SpO2: 96 %      Physical Exam  Vitals and nursing note reviewed.   Constitutional:       General: She is not in acute distress.     Appearance: She is well-developed. She is obese. She is not ill-appearing, toxic-appearing or diaphoretic.   HENT:      Head: Normocephalic and atraumatic.      Nose: Nose normal.   Eyes:      Conjunctiva/sclera: Conjunctivae normal.   Cardiovascular:      Rate and Rhythm: Normal rate and regular rhythm.      Pulses: Normal pulses.      Heart sounds: Normal heart sounds. No murmur heard.  Pulmonary:      Effort: Pulmonary effort is normal.      Breath sounds: Normal breath sounds. No wheezing or rhonchi.   Chest:      Chest wall: No tenderness.   Abdominal:     "  General: Abdomen is flat.      Palpations: Abdomen is soft.      Comments: Nondistended, tender to palpation of helical region there is a periumbilical hernia present and I feel I am able to reduce this palpation does reproduce pain.  No erythema or swelling noted no other obvious abdominal tenderness bowel sounds are positive.   Musculoskeletal:         General: No swelling or tenderness. Normal range of motion.      Cervical back: Normal range of motion and neck supple.      Right lower leg: No edema.      Left lower leg: No edema.   Skin:     General: Skin is warm and dry.      Capillary Refill: Capillary refill takes less than 2 seconds.      Findings: No bruising or erythema.   Neurological:      General: No focal deficit present.      Mental Status: She is alert and oriented to person, place, and time.      Motor: No weakness.      Coordination: Coordination normal.   Psychiatric:      Comments: Patient appears anxious: Complaining of olfactory hallucinations.  Occasional flight of ideas and vague suicidal ideation.         ED Course         Suicide assessment completed by mental health (D.E.C., LCSW, etc.)       Procedures              Critical Care time:  none               Results for orders placed or performed during the hospital encounter of 03/03/22 (from the past 24 hour(s))   CBC with platelets differential    Narrative    The following orders were created for panel order CBC with platelets differential.  Procedure                               Abnormality         Status                     ---------                               -----------         ------                     CBC with platelets and d...[[id number]]  Abnormal            Final result                 Please view results for these tests on the individual orders.   Comprehensive metabolic panel   Result Value Ref Range    Sodium 137 133 - 144 mmol/L    Potassium 3.9 3.4 - 5.3 mmol/L    Chloride 106 94 - 109 mmol/L    Carbon Dioxide (CO2) 24  20 - 32 mmol/L    Anion Gap 7 3 - 14 mmol/L    Urea Nitrogen 10 7 - 30 mg/dL    Creatinine 0.74 0.52 - 1.04 mg/dL    Calcium 9.5 8.5 - 10.1 mg/dL    Glucose 200 (H) 70 - 99 mg/dL    Alkaline Phosphatase 102 40 - 150 U/L     (H) 0 - 45 U/L     (H) 0 - 50 U/L    Protein Total 7.6 6.8 - 8.8 g/dL    Albumin 3.7 3.4 - 5.0 g/dL    Bilirubin Total 0.5 0.2 - 1.3 mg/dL    GFR Estimate >90 >60 mL/min/1.73m2   Lipase   Result Value Ref Range    Lipase 78 73 - 393 U/L   HCG qualitative Blood   Result Value Ref Range    hCG Serum Qualitative Negative Negative   Lithium level   Result Value Ref Range    Lithium 0.4   mmol/L   CBC with platelets and differential   Result Value Ref Range    WBC Count 12.1 (H) 4.0 - 11.0 10e3/uL    RBC Count 4.37 3.80 - 5.20 10e6/uL    Hemoglobin 13.2 11.7 - 15.7 g/dL    Hematocrit 40.1 35.0 - 47.0 %    MCV 92 78 - 100 fL    MCH 30.2 26.5 - 33.0 pg    MCHC 32.9 31.5 - 36.5 g/dL    RDW 12.5 10.0 - 15.0 %    Platelet Count 275 150 - 450 10e3/uL    % Neutrophils 72 %    % Lymphocytes 17 %    % Monocytes 6 %    % Eosinophils 3 %    % Basophils 1 %    % Immature Granulocytes 1 %    NRBCs per 100 WBC 0 <1 /100    Absolute Neutrophils 8.7 (H) 1.6 - 8.3 10e3/uL    Absolute Lymphocytes 2.1 0.8 - 5.3 10e3/uL    Absolute Monocytes 0.7 0.0 - 1.3 10e3/uL    Absolute Eosinophils 0.4 0.0 - 0.7 10e3/uL    Absolute Basophils 0.1 0.0 - 0.2 10e3/uL    Absolute Immature Granulocytes 0.1 <=0.4 10e3/uL    Absolute NRBCs 0.0 10e3/uL   UA with Microscopic reflex to Culture    Specimen: Urine, Midstream   Result Value Ref Range    Color Urine Yellow Colorless, Straw, Light Yellow, Yellow    Appearance Urine Slightly Cloudy (A) Clear    Glucose Urine Negative Negative mg/dL    Bilirubin Urine Negative Negative    Ketones Urine Negative Negative mg/dL    Specific Gravity Urine 1.023 1.003 - 1.035    Blood Urine Large (A) Negative    pH Urine 5.0 5.0 - 7.0    Protein Albumin Urine 100  (A) Negative mg/dL     Urobilinogen Urine Normal Normal, 2.0 mg/dL    Nitrite Urine Negative Negative    Leukocyte Esterase Urine Small (A) Negative    Bacteria Urine Few (A) None Seen /HPF    Mucus Urine Present (A) None Seen /LPF    RBC Urine 2 <=2 /HPF    WBC Urine 14 (H) <=5 /HPF    Squamous Epithelials Urine 7 (H) <=1 /HPF    Hyaline Casts Urine 1 <=2 /LPF    Narrative    Urine Culture ordered based on laboratory criteria   CT Abdomen Pelvis w Contrast    Narrative    CT ABDOMEN AND PELVIS WITH CONTRAST 3/3/2022 10:12 AM    CLINICAL HISTORY: Abdominal pain. Abdominal distension.    TECHNIQUE: CT scan of the abdomen and pelvis was performed following  injection of IV contrast. Multiplanar reformats were obtained. Dose  reduction techniques were used.    CONTRAST: 100mL [id number]    COMPARISON: October 29, 2021    FINDINGS:   LOWER CHEST: Minimal linear atelectasis and/or fibrosis. No  infiltrates or effusions.    HEPATOBILIARY: Severe hepatic steatosis and hepatomegaly. No calcified  gallstones or biliary dilatation.    PANCREAS: No significant mass, duct dilatation, or inflammatory  change.    SPLEEN: Splenomegaly at 14.1 cm increased from 13.1 cm previously.    ADRENAL GLANDS: No significant nodules.    KIDNEYS/BLADDER: No significant mass, stones, or hydronephrosis.    BOWEL: No obstruction or inflammatory change.    PELVIC ORGANS: 3.8 cm cystic lesion in the right ovary. Stable  nonspecific low dense or cystic lesion in the posterolateral left  pelvis measuring 2.6 cm when measured in a similar manner.    ADDITIONAL FINDINGS: No free air or free fluid. Normal caliber aorta.  Fat-containing periumbilical hernia is stable with a small amount of  fluid and stranding.    MUSCULOSKELETAL: No frankly destructive bony lesions.      Impression    IMPRESSION:   1.  Severe hepatic steatosis and hepatomegaly, question  steatohepatitis.  2.  Mild splenomegaly at 14.1 cm increased from previous.  3.  3.8 cm cyst in the right ovary.  4.   Stable small fat-containing periumbilical hernia.    AUREA JEONG MD         SYSTEM ID:  [id number]       Medications - No data to display    Assessments & Plan (with Medical Decision Making) records were reviewed.  Labs were obtained.  CBC with a white count elevated at 12.1 and a left shift at 8.7 otherwise unremarkable.  Pregnancy test was negative.  Comprehensive metabolic panel significant for glucose of 200 AST and ALT are mildly elevated and are similar to previous.  Lipase was within normal limits.  Lithium level was 0.04.  Urine analysis with slightly cloudy 100 protein small leukocyte Estrace 14 WBCs but 7 squamo due to patient's us cells.  Patient denies urinary symptoms and we will therefore await urine culture results.  Due to her medical pain a CT scan was obtained.  This revealed severe hepatic steatosis and hepatomegaly which is similar to previous.  Mild splenomegaly.  A right ovarian cyst at 3.8 cm patient is not tender to palpation when I repalpated the right lower quadrant region.  She also does have this small fat-containing periumbilical hernia with no evidence of incarceration.  Due to patient's psychiatric complaints we also had a DEC consultation obtained.  Due to her increased sensation of olfactory hallucinations depression anxiety and occasional suicidal ideation patient is requesting possible admission as she cannot control her thoughts and delusions as well as usual and wants to get medications under control so she is doing better.  She was assessed and it was felt that it would be best to admit the patient.  Patient is agree with being admitted and feels comfortable coming in.  [facility] did have a bed available and I talked with the nurse practitioner at [facility] answered all her questions and felt she was medically stable to be admitted.  Patient again was informed of plan and she is in agreement.     I have reviewed the nursing notes.    I have reviewed the findings, diagnosis, plan  and need for follow up with the patient.       New Prescriptions    No medications on file       Final diagnoses:   Periumbilical abdominal pain   Schizoaffective disorder, bipolar type (H)       3/3/2022   [facility] EMERGENCY DEPT     Jay Jay Huerta MD  03/05/22 [id number]

## 2022-03-03 NOTE — PLAN OF CARE
03/03/22 1641   Patient Belongings   Did you bring any home meds/supplements to the hospital?  No   Patient Belongings locker   Patient Belongings Put in Hospital Secure Location (Security or Locker, etc.) clothing;jewelry;purse/wallet;shoes   Belongings Search Yes   Clothing Search Yes   Second Staff Cassidy valverde   Goal Outcome Evaluation:      Items placed in secure locker on unit ([address]):  Purple pants, purple zip up, Aquaphor, hand , green tank top, smart phone, black bra, flashlight, personal mail, watch, agenda, journal, black boots, laura purse, chapstick.    No wallet or keys  A               Admission:  I am responsible for any personal items that are not sent to the safe or pharmacy.  [facility] is not responsible for loss, theft or damage of any property in my possession.    Signature:  _________________________________ Date: _______  Time: _____                                              Staff Signature:  ____________________________ Date: ________  Time: _____      2nd Staff person, if patient is unable/unwilling to sign:    Signature: ________________________________ Date: ________  Time: _____     Discharge:  [facility] has returned all of my personal belongings:    Signature: _________________________________ Date: ________  Time: _____                                          Staff Signature:  ____________________________ Date: ________  Time: _____

## 2022-03-04 LAB
ALBUMIN SERPL-MCNC: 3.4 G/DL (ref 3.4–5)
ALP SERPL-CCNC: 96 U/L (ref 40–150)
ALT SERPL W P-5'-P-CCNC: 107 U/L (ref 0–50)
AST SERPL W P-5'-P-CCNC: 120 U/L (ref 0–45)
BACTERIA UR CULT: NORMAL
BASOPHILS # BLD AUTO: 0.1 10E3/UL (ref 0–0.2)
BASOPHILS NFR BLD AUTO: 1 %
BILIRUB DIRECT SERPL-MCNC: 0.1 MG/DL (ref 0–0.2)
BILIRUB SERPL-MCNC: 0.5 MG/DL (ref 0.2–1.3)
CHOLEST SERPL-MCNC: 208 MG/DL
DEPRECATED S PYO AG THROAT QL EIA: NEGATIVE
EOSINOPHIL # BLD AUTO: 0.4 10E3/UL (ref 0–0.7)
EOSINOPHIL NFR BLD AUTO: 3 %
ERYTHROCYTE [DISTWIDTH] IN BLOOD BY AUTOMATED COUNT: 12.7 % (ref 10–15)
FLUAV AG SPEC QL IA: NEGATIVE
FLUBV AG SPEC QL IA: NEGATIVE
GLUCOSE BLDC GLUCOMTR-MCNC: 152 MG/DL (ref 70–99)
GLUCOSE BLDC GLUCOMTR-MCNC: 162 MG/DL (ref 70–99)
GLUCOSE BLDC GLUCOMTR-MCNC: 241 MG/DL (ref 70–99)
GROUP A STREP BY PCR: NOT DETECTED
HCT VFR BLD AUTO: 38.5 % (ref 35–47)
HDLC SERPL-MCNC: 33 MG/DL
HGB BLD-MCNC: 12.9 G/DL (ref 11.7–15.7)
IMM GRANULOCYTES # BLD: 0.1 10E3/UL
IMM GRANULOCYTES NFR BLD: 1 %
LDLC SERPL CALC-MCNC: 114 MG/DL
LYMPHOCYTES # BLD AUTO: 1.6 10E3/UL (ref 0.8–5.3)
LYMPHOCYTES NFR BLD AUTO: 12 %
MCH RBC QN AUTO: 30.2 PG (ref 26.5–33)
MCHC RBC AUTO-ENTMCNC: 33.5 G/DL (ref 31.5–36.5)
MCV RBC AUTO: 90 FL (ref 78–100)
MONOCYTES # BLD AUTO: 0.9 10E3/UL (ref 0–1.3)
MONOCYTES NFR BLD AUTO: 7 %
NEUTROPHILS # BLD AUTO: 9.9 10E3/UL (ref 1.6–8.3)
NEUTROPHILS NFR BLD AUTO: 76 %
NONHDLC SERPL-MCNC: 175 MG/DL
NRBC # BLD AUTO: 0 10E3/UL
NRBC BLD AUTO-RTO: 0 /100
PLATELET # BLD AUTO: 249 10E3/UL (ref 150–450)
PROT SERPL-MCNC: 7.2 G/DL (ref 6.8–8.8)
RBC # BLD AUTO: 4.27 10E6/UL (ref 3.8–5.2)
T4 FREE SERPL-MCNC: 1.16 NG/DL (ref 0.76–1.46)
TRIGL SERPL-MCNC: 306 MG/DL
TSH SERPL DL<=0.005 MIU/L-ACNC: 6.22 MU/L (ref 0.4–4)
WBC # BLD AUTO: 12.9 10E3/UL (ref 4–11)

## 2022-03-04 PROCEDURE — 84439 ASSAY OF FREE THYROXINE: CPT | Performed by: NURSE PRACTITIONER

## 2022-03-04 PROCEDURE — 250N000013 HC RX MED GY IP 250 OP 250 PS 637: Performed by: NURSE PRACTITIONER

## 2022-03-04 PROCEDURE — 250N000013 HC RX MED GY IP 250 OP 250 PS 637: Performed by: PSYCHIATRY & NEUROLOGY

## 2022-03-04 PROCEDURE — 80061 LIPID PANEL: CPT | Performed by: NURSE PRACTITIONER

## 2022-03-04 PROCEDURE — 87651 STREP A DNA AMP PROBE: CPT | Performed by: PSYCHIATRY & NEUROLOGY

## 2022-03-04 PROCEDURE — 85004 AUTOMATED DIFF WBC COUNT: CPT | Performed by: NURSE PRACTITIONER

## 2022-03-04 PROCEDURE — 36415 COLL VENOUS BLD VENIPUNCTURE: CPT | Performed by: NURSE PRACTITIONER

## 2022-03-04 PROCEDURE — 124N000002 HC R&B MH UMMC

## 2022-03-04 PROCEDURE — 87804 INFLUENZA ASSAY W/OPTIC: CPT | Performed by: PSYCHIATRY & NEUROLOGY

## 2022-03-04 PROCEDURE — 82040 ASSAY OF SERUM ALBUMIN: CPT | Performed by: NURSE PRACTITIONER

## 2022-03-04 PROCEDURE — 84443 ASSAY THYROID STIM HORMONE: CPT | Performed by: NURSE PRACTITIONER

## 2022-03-04 PROCEDURE — 99223 1ST HOSP IP/OBS HIGH 75: CPT | Mod: AI | Performed by: PSYCHIATRY & NEUROLOGY

## 2022-03-04 RX ORDER — AMMONIUM LACTATE 12 G/100G
CREAM TOPICAL 2 TIMES DAILY PRN
Status: DISCONTINUED | OUTPATIENT
Start: 2022-03-04 | End: 2022-03-30 | Stop reason: HOSPADM

## 2022-03-04 RX ORDER — NICOTINE POLACRILEX 4 MG
15-30 LOZENGE BUCCAL
Status: DISCONTINUED | OUTPATIENT
Start: 2022-03-04 | End: 2022-03-30 | Stop reason: HOSPADM

## 2022-03-04 RX ORDER — LEVOTHYROXINE SODIUM 25 UG/1
50 TABLET ORAL
Status: DISCONTINUED | OUTPATIENT
Start: 2022-03-05 | End: 2022-03-30 | Stop reason: HOSPADM

## 2022-03-04 RX ORDER — SIMETHICONE 80 MG
80 TABLET,CHEWABLE ORAL EVERY 6 HOURS PRN
Status: DISCONTINUED | OUTPATIENT
Start: 2022-03-04 | End: 2022-03-30 | Stop reason: HOSPADM

## 2022-03-04 RX ORDER — PRENATAL VIT 91/IRON/FOLIC/DHA 28-975-200
COMBINATION PACKAGE (EA) ORAL 2 TIMES DAILY PRN
Status: DISCONTINUED | OUTPATIENT
Start: 2022-03-04 | End: 2022-03-30 | Stop reason: HOSPADM

## 2022-03-04 RX ORDER — BENZTROPINE MESYLATE 0.5 MG/1
0.5 TABLET ORAL DAILY PRN
Status: DISCONTINUED | OUTPATIENT
Start: 2022-03-04 | End: 2022-03-30 | Stop reason: HOSPADM

## 2022-03-04 RX ORDER — RISPERIDONE 0.5 MG/1
0.5 TABLET ORAL DAILY
Status: DISCONTINUED | OUTPATIENT
Start: 2022-03-05 | End: 2022-03-07

## 2022-03-04 RX ORDER — RISPERIDONE 0.5 MG/1
0.5 TABLET ORAL 2 TIMES DAILY
Status: DISCONTINUED | OUTPATIENT
Start: 2022-03-04 | End: 2022-03-04

## 2022-03-04 RX ORDER — RISPERIDONE 1 MG/1
1 TABLET ORAL EVERY EVENING
Status: DISCONTINUED | OUTPATIENT
Start: 2022-03-04 | End: 2022-03-07

## 2022-03-04 RX ORDER — LITHIUM CARBONATE 300 MG/1
600 TABLET, FILM COATED, EXTENDED RELEASE ORAL 2 TIMES DAILY
Status: DISCONTINUED | OUTPATIENT
Start: 2022-03-04 | End: 2022-03-14

## 2022-03-04 RX ORDER — METFORMIN HCL 500 MG
1000 TABLET, EXTENDED RELEASE 24 HR ORAL 2 TIMES DAILY WITH MEALS
Status: CANCELLED | OUTPATIENT
Start: 2022-03-04

## 2022-03-04 RX ORDER — GABAPENTIN 300 MG/1
300 CAPSULE ORAL 3 TIMES DAILY
Status: DISCONTINUED | OUTPATIENT
Start: 2022-03-04 | End: 2022-03-30 | Stop reason: HOSPADM

## 2022-03-04 RX ORDER — LEVOTHYROXINE SODIUM 25 UG/1
25 TABLET ORAL
Status: DISCONTINUED | OUTPATIENT
Start: 2022-03-04 | End: 2022-03-04

## 2022-03-04 RX ORDER — ALBUTEROL SULFATE 90 UG/1
2 AEROSOL, METERED RESPIRATORY (INHALATION) EVERY 4 HOURS PRN
Status: DISCONTINUED | OUTPATIENT
Start: 2022-03-04 | End: 2022-03-30 | Stop reason: HOSPADM

## 2022-03-04 RX ORDER — MINERAL OIL/HYDROPHIL PETROLAT
OINTMENT (GRAM) TOPICAL DAILY PRN
Status: DISCONTINUED | OUTPATIENT
Start: 2022-03-04 | End: 2022-03-30 | Stop reason: HOSPADM

## 2022-03-04 RX ORDER — HYDROXYZINE HYDROCHLORIDE 25 MG/1
25 TABLET, FILM COATED ORAL
Status: CANCELLED | OUTPATIENT
Start: 2022-03-04

## 2022-03-04 RX ORDER — DEXTROSE MONOHYDRATE 25 G/50ML
25-50 INJECTION, SOLUTION INTRAVENOUS
Status: DISCONTINUED | OUTPATIENT
Start: 2022-03-04 | End: 2022-03-30 | Stop reason: HOSPADM

## 2022-03-04 RX ADMIN — GABAPENTIN 300 MG: 300 CAPSULE ORAL at 21:11

## 2022-03-04 RX ADMIN — LITHIUM CARBONATE 600 MG: 300 TABLET, EXTENDED RELEASE ORAL at 21:11

## 2022-03-04 RX ADMIN — ACETAMINOPHEN 650 MG: 325 TABLET, FILM COATED ORAL at 18:03

## 2022-03-04 RX ADMIN — LITHIUM CARBONATE 600 MG: 300 TABLET, EXTENDED RELEASE ORAL at 09:00

## 2022-03-04 RX ADMIN — Medication 10 MG: at 21:11

## 2022-03-04 RX ADMIN — GABAPENTIN 300 MG: 300 CAPSULE ORAL at 09:00

## 2022-03-04 RX ADMIN — LEVOTHYROXINE SODIUM 25 MCG: 25 TABLET ORAL at 09:00

## 2022-03-04 RX ADMIN — RISPERIDONE 1 MG: 1 TABLET ORAL at 21:11

## 2022-03-04 RX ADMIN — METFORMIN HYDROCHLORIDE 1000 MG: 500 TABLET, EXTENDED RELEASE ORAL at 09:00

## 2022-03-04 RX ADMIN — METFORMIN HYDROCHLORIDE 1000 MG: 500 TABLET, EXTENDED RELEASE ORAL at 18:03

## 2022-03-04 RX ADMIN — HYDROXYZINE HYDROCHLORIDE 25 MG: 25 TABLET, FILM COATED ORAL at 21:11

## 2022-03-04 RX ADMIN — GABAPENTIN 300 MG: 300 CAPSULE ORAL at 13:40

## 2022-03-04 RX ADMIN — RISPERIDONE 0.5 MG: 0.5 TABLET ORAL at 09:00

## 2022-03-04 ASSESSMENT — ACTIVITIES OF DAILY LIVING (ADL)
LAUNDRY: WITH SUPERVISION
HYGIENE/GROOMING: INDEPENDENT
ORAL_HYGIENE: INDEPENDENT
DRESS: INDEPENDENT
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
## 2022-03-04 NOTE — PLAN OF CARE
Progress note:  Patient sat with a psych associate to attempt to complete a form for her daughter. Patient stated she needed to complete the form so that her daughter can use the bus to get to/from school. Psych associate reported patient did not complete the form.   When writer met with patient to ask if she was able to use the computer, patient appeared confused. She stated, no, and then yes, but was not able to elaborate.

## 2022-03-04 NOTE — PROGRESS NOTES
"CLINICAL NUTRITION SERVICES - ASSESSMENT NOTE     Nutrition Prescription    RECOMMENDATIONS FOR MDs/PROVIDERS TO ORDER:  none    Malnutrition Status:    Patient does not meet criteria for diagnosing malnutrition     Recommendations already ordered by Registered Dietitian (RD):  Change diet from high consistent CHO to moderate consistent CHO    Future/Additional Recommendations:  Monitor BG, food choices, weight     REASON FOR ASSESSMENT  Maddy Ortiz is a/an 36 year old female assessed by the dietitian for Provider Order - Pt with diabetes and obesity. Would like help with consistant CHO diet    NUTRITION HISTORY  Pt followed by outpatient RD for weight reduction and DM  Oberved eating lunch - meatball sandwich, rice, jello, canned fruit x 2 on tray.  Pt able ro verbalize CHO containing foods, including the difference between complex CHO, fructose and sucrose, therefore appear to be knowledgeable about diet, but compliance is questionable.  Per outpatient RD notes, pt struggles with high sugar snacks and low activity.  Goals included limiting to one sugar snack per day, increasing veggie intake, and planning meals with 15yo daughter.    CURRENT NUTRITION ORDERS  Diet: High Consistent Carbohydrate  Intake/Tolerance: 100%    LABS  Labs reviewed  (H) <-- 194 (3/3, H)    MEDICATIONS  Medications reviewed and include glucophage    ANTHROPOMETRICS  Height: 162.6 cm (5' 4\")  Most Recent Weight: 112.6 kg (248 lb 3.2 oz)    IBW: 55 kg  BMI: Obesity Grade III BMI >40  Weight History: current weight down from highest weight of 260 lbs 11/21  Dosing Weight: 69 kg (adjusted for obesity using adm weight of 112.6 and IBW of 55 kg)    Wt Readings from Last 10 Encounters:   03/03/22 112.6 kg (248 lb 3.2 oz)   03/03/22 120.2 kg (265 lb)   02/07/22 114.8 kg (253 lb)   02/04/22 114.9 kg (253 lb 3.2 oz)   01/08/22 116.1 kg (256 lb)   12/13/21 111.1 kg (245 lb)   11/30/21 115.9 kg (255 lb 9.6 oz)   11/08/21 117.4 kg (258 lb 12.8 " oz)   11/05/21 117.9 kg (260 lb)   10/26/21 114.3 kg (252 lb)       ASSESSED NUTRITION NEEDS  Estimated Energy Needs: 1225 - 1570 kcals/day (25-30 kcals/kg - 500 kcals / day)  Justification: weight loss desired  Estimated Protein Needs: 55 grams protein/day (0.8 grams of pro/kg)  Justification: Maintenance  Estimated Fluid Needs: 1 mL/kcal  Justification: Maintenance    PHYSICAL FINDINGS  See malnutrition section below.     MALNUTRITION  % Intake: No decreased intake noted  % Weight Loss: Weight loss does not meet criteria  Subcutaneous Fat Loss: None observed  Muscle Loss: None observed  Fluid Accumulation/Edema: Does not meet criteria  Malnutrition Diagnosis: Patient does not meet two of the established criteria necessary for diagnosing malnutrition    NUTRITION DIAGNOSIS  Excessive energy intake related to high sugar food choices as evidenced by -200 and BMI > 40 kg/m2      INTERVENTIONS  Implementation  Nutrition Education: provided handout on CHO content of hospital menu    Collaboration with other providers - discussed with RN     Goals  1. Patient to limit CHO to 45-60 gm / meal, snacks <= 15 gm CHO per meal  2. Pt to maintain weight @ 112 kg, with weight loss desired     Monitoring/Evaluation  Progress toward goals will be monitored and evaluated per protocol.    Omayra Washington RD, LD  Unit pager [phone]

## 2022-03-04 NOTE — CARE PLAN
03/04/22 1403   Team Discussion   Participants Janelle Elias, N.P., Paul, RN, Yanet, CTC, Maura, CTC   Progress New patient, no progress.   Anticipated length of stay 5-7 days   Continued Stay Criteria/Rationale Psychotic symptoms, need to stabilize and symptoms reduced.   Medical/Physical 1) Diabetes Type 2-Hgb A1c 8.4 on 1/8/22-Patient does not take home FSBG-Continue PTA Metformin-FSBG BID-Nutrition consult   2) Asthma-Pt denies concerns but has nasal congestion and pharyngitis. -Monitor for worsening asthma symptoms -PTA albuterol inhaler   3) Hernia -CT abdomen shows mandeep umbilical hernia -Evaluated in ED, please see ED provider note for details-Pain mgmt with tylenol-Monitor for worsening   4) Hypothyroidism-TSH 6.22 and T4 1.16 on 3/4/22-Patient inconsistant with medication -Continue home Synthroid 25 mcg  5) Possible UTI-UA 3/3/21 suspicious for UTI-Patient asymptomatic- culture results show mixed urogenital spencer 6) Leukocytosis-Elevated WBC on 3/3 and 3/4 (12.1 and 12.9 respectively)-Possibly related to Lithium use-Repeat CBC 3/5 -IM consulted  7) URI-Nasal congestion and pharyngitis-Strep negative-Influenza pending-Supportive cares   Precautions No precautions   Plan Observation, medication management, group therapy, milieu therapy, CTC management   Rationale for change in precautions or plan No change

## 2022-03-04 NOTE — PLAN OF CARE
03/04/22 1403   Individualization/Patient Specific Goals   Patient Personal Strengths family/social support   Patient Vulnerabilities recent loss   Anxieties, Fears or Concerns Patient stated she is not feeling well due to a virus.   Individualized Care Needs Stabilization and symptoms reduction.   Patient-Specific Goals (Include Timeframe) Patient states she wants to return home to her daughter and to feel better.   Interprofessional Rounds   Summary Patient care was discussed in team meeting - new patient.   Participants advanced practice nurse;;nursing;psychiatrist   Team Discussion   Participants Janelle Elias, N.P., Paul, RN, Yanet, CTC, Maura, CTC   Progress New patient, no progress.   Anticipated length of stay 5-7 days   Continued Stay Criteria/Rationale Psychotic symptoms, need to stabilize and symptoms reduced.   Medical/Physical 1) Diabetes Type 2-Hgb A1c 8.4 on 1/8/22-Patient does not take home FSBG-Continue PTA Metformin-FSBG BID-Nutrition consult   2) Asthma-Pt denies concerns but has nasal congestion and pharyngitis. -Monitor for worsening asthma symptoms -PTA albuterol inhaler   3) Hernia -CT abdomen shows mandeep umbilical hernia -Evaluated in ED, please see ED provider note for details-Pain mgmt with tylenol-Monitor for worsening   4) Hypothyroidism-TSH 6.22 and T4 1.16 on 3/4/22-Patient inconsistant with medication -Continue home Synthroid 25 mcg  5) Possible UTI-UA 3/3/21 suspicious for UTI-Patient asymptomatic- culture results show mixed urogenital spencer 6) Leukocytosis-Elevated WBC on 3/3 and 3/4 (12.1 and 12.9 respectively)-Possibly related to Lithium use-Repeat CBC 3/5 -IM consulted  7) URI-Nasal congestion and pharyngitis-Strep negative-Influenza pending-Supportive cares   Precautions No precautions   Plan Observation, medication management, group therapy, milieu therapy, CTC management   Rationale for change in precautions or plan No change   Safety Plan Code 1: S-15    Anticipated Discharge Disposition home with family         PRECAUTIONS AND SAFETY    Behavioral Orders   Procedures    Code 1 - Restrict to Unit    Routine Programming     As clinically indicated    Status 15     Every 15 minutes.       Safety  Safety WDL: WDL  Patient Location: patient room, own  Observed Behavior: calm  Safety Measures: environmental rounds completed, safety rounds completed, suicide check-in completed  Suicidality: Status 15, Behavioral scrubs (pajamas), Unpredictable frequency of checking on patient     Pt care was discussed in team meeting. Patient is new and more observation is needed. Patient is admitted due to symptoms of hallucinations and paranoia. Patient is her own guardian and voluntary.   Patient will be stabilized, symptoms reduced, medication therapy, group therapy, and CTC will provide case management.

## 2022-03-04 NOTE — CONSULTS
"  [facility]    Internal Medicine Initial Consult       Date of Admission: 3/3/2022  Consult Requested by: Juliette Wallace MD  Reason for Consult: \"Leukocytosis, appears long standing, some left shift, cannot find any records of additional work up/reasoning, is on lithium. Pt also reporting viral sx, covid negative, other labs ordered.\"    Assessment & Recommendations   Maddy Ortiz is a 36 year old woman with a past medical history of DM2, bipolar disorder, depression, and seasonal allergies who is admitted to station [facility] with suicidal ideations.        Suicidal Ideations - Patient endorsing suicidal ideations and olfactory hallucinations.   -Management per psychiatry team.     Nasal Congestion  Body Aches - Patient endorses body aches and nasal congestion. Denies cough, fevers.  COVID, Influenza A and B negative.  Suspect underlying viral syndrome   -Add on full respiratory viral panel    Abdominal Pain  Diarrhea   Hx of Umbilical Hernia - Patient endorses generalized achy abdominal pain with associated diarrhea.  She denies fevers, new foods, constipation. CT abdomen/pelvis with \"severe hepatic steatosis and hepatomegaly, question steatohepatitis. Mild splenomegaly at 14.1 cm increased from previous. 8 cm cyst in the right ovary.Stable small fat-containing periumbilical hernia.\" Etiology of abdominal pain uncertain, suspect gastroenteritis vs somatic vs other  -obtain enteric panel for diarrhea   -add gas-x for symptom management     Leukocytosis - Evident since 2017.  Appears to range between 11-13. Etiology uncertain, suspect underlying viral infection contributing. Very likely it is secondary to ongoing lithium use  -CTM    Elevated LFTs - , . Uptrending since 2021. Hepatitis testing all nonreactive. RUQ ultrasound in January 2022 with fatty liver. CT abdomen/pelvis 3/3 with severe hepatic steatosis and hepatomegaly.  Elevations likely secondary " to known fatty liver disease.   -Recommend outpatient follow up with PCP     Elevated TSH - TSH 6.22, free T4 1.16. C/f hypothyroidism 2/2 lithium use. Currently on levothyroxine 25mcg daily   -Increase to 50mcg daily   -repeat thyroid studies in 4-6 weeks     DM2 - Hgb A1c 8.4 in January 2022. Managed with metformin 1000mg BID   -Continue metformin while inpatient   -Consider adding pioglitazone or liraglutide pending blood glucose trends     Medicine will continue to follow, please page with any additional concerns.     Marsha Mcfarlane PA-C  Hospitalist Service  Contact information available via [facility] Paging/Directory  ______________________________________________________________________    Reason for Admission  Suicidal ideations    Chief Complaint   Abdominal pain     History of Present Illness   History is obtained from the patient and medical record.     Maddy Ortiz is a 36 year old year old woman with a PMH as listed above who is admitted to behavioral health for suicidal ideations.     Patient states she has felt unwell for the past 2 days.  She endorses nasal congestion, body aches, abdominal pain and diarrhea. She denies any sick contacts or new foods.  She admits she feels bloated and is passing more gas.   She denies fevers, chills, chest pain, palpitations, SOB, nausea, vomiting.     Review of Systems   10 point ROS performed and negative unless otherwise noted in HPI     Past Medical History    I have reviewed this patient's medical history and updated it with pertinent information if needed.   Past Medical History:   Diagnosis Date     Bipolar 1 disorder (H) 12/6/2012     Depressive disorder      Diabetes mellitus, type 2 (H) 12/13/2021     Paranoid type delusional disorder (H) 11/14/2012        Past Surgical History   I have reviewed this patient's surgical history and updated it with pertinent information if needed.  Past Surgical History:   Procedure Laterality Date     TONSILLECTOMY &  ADENOIDECTOMY      as a child     TONSILLECTOMY & ADENOIDECTOMY          Social History   Social History     Tobacco Use     Smoking status: Never Smoker     Smokeless tobacco: Never Used     Tobacco comment: around 2nd hand smoke   Vaping Use     Vaping Use: Never used   Substance Use Topics     Alcohol use: Not Currently     Drug use: Not Currently       Family History   I have reviewed this patient's family history and updated it with pertinent information if needed.   Family History   Adopted: Yes   Problem Relation Age of Onset     Unknown/Adopted Mother      Unknown/Adopted Father      Unknown/Adopted Other      Hypertension Sister        Medications   Medications Prior to Admission   Medication Sig Dispense Refill Last Dose     albuterol (PROAIR HFA/PROVENTIL HFA/VENTOLIN HFA) 108 (90 Base) MCG/ACT inhaler Inhale 2 puffs into the lungs every 4 hours as needed for wheezing or shortness of breath / dyspnea 18 g 3      ammonium lactate (LAC-HYDRIN) 12 % external cream Apply topically 2 times daily as needed for dry skin 385 g 3      benztropine (COGENTIN) 0.5 MG tablet Take 1 tablet (0.5 mg) by mouth daily as needed 30 tablet 1      EPINEPHrine (ANY BX GENERIC EQUIV) 0.3 MG/0.3ML injection 2-pack Inject 0.3 mg into the muscle as needed for anaphylaxis        gabapentin (NEURONTIN) 300 MG capsule Take 1 capsule (300 mg) by mouth 3 times daily 90 capsule 4 3/2/2022 at Unknown time     hydrOXYzine (ATARAX) 25 MG tablet Take 1 tablet by mouth nightly as needed        levothyroxine (SYNTHROID/LEVOTHROID) 25 MCG tablet Take 1 tablet (25 mcg) by mouth every morning (before breakfast) 30 tablet 1 Past Week at am     lithium ER (LITHOBID) 300 MG CR tablet Take 4 tablets (1,200 mg) by mouth At Bedtime (Patient taking differently: Take 600 mg by mouth 2 times daily ) 120 tablet 1 3/2/2022 at am     Melatonin 10 MG TABS tablet Take 10 mg by mouth nightly as needed for sleep         metFORMIN (GLUCOPHAGE-XR) 500 MG 24 hr  "tablet Take 2 tablets (1,000 mg) by mouth 2 times daily (with meals) 120 tablet 1 3/2/2022 at Unknown time     mineral oil-hydrophilic petrolatum (AQUAPHOR) external ointment Apply topically daily as needed for dry skin (apply to dry skin and rash around umbilical hernia)        risperiDONE (RISPERDAL) 0.5 MG tablet Take 0.5 mg by mouth 2 times daily    3/2/2022 at am     terbinafine (LAMISIL AT) 1 % external cream Apply topically 2 times daily 12 g 1 Past Month at Unknown time       Allergies      Allergies   Allergen Reactions     Dust Mites Shortness Of Breath     Animal Dander      Other reaction(s): *Unknown     Metformin Fatigue     Patient is taking at home as of 3/3/22     Mold      Other reaction(s): Runny Nose     Trees        Physical Exam   /87 (BP Location: Right arm, Patient Position: Sitting, Cuff Size: Adult Large)   Pulse 100   Temp 98.1  F (36.7  C) (Oral)   Resp 18   Ht 1.626 m (5' 4\")   Wt 112.6 kg (248 lb 3.2 oz)   SpO2 96%   BMI 42.60 kg/m     GENERAL: Flat affect. In NAD.  Cooperative   HEENT: Anicteric sclera. Mucous membranes moist.  Nasal congestion noted.   CV: RRR. S1, S2. No murmurs appreciated.   RESPIRATORY: Effort normal on room air. Lungs CTAB with no wheezing, rales, rhonchi.   GI: Abdomen soft, mildly distended.  Reduceable umbilical hernia in place. Bowel sounds present in all quadrants.   NEUROLOGICAL: No focal deficits. Moves all extremities.   EXTREMITIES: No peripheral edema. Warm and well perfused.   SKIN: No jaundice. No rashes.     Data   Data reviewed today: I reviewed all medications, new labs and imaging results over the last 24 hours.     Results for HARVEY MITCHELL (MRN [id number]) as of 3/4/2022 13:31   Ref. Range 3/4/2022 07:31   Albumin Latest Ref Range: 3.4 - 5.0 g/dL 3.4   Protein Total Latest Ref Range: 6.8 - 8.8 g/dL 7.2   Bilirubin Total Latest Ref Range: 0.2 - 1.3 mg/dL 0.5   Alkaline Phosphatase Latest Ref Range: 40 - 150 U/L 96   ALT Latest Ref " Range: 0 - 50 U/L 107 (H)   AST Latest Ref Range: 0 - 45 U/L 120 (H)   Bilirubin Direct Latest Ref Range: 0.0 - 0.2 mg/dL 0.1   Cholesterol Latest Ref Range: <200 mg/dL 208 (H)   HDL Cholesterol Latest Ref Range: >=50 mg/dL 33 (L)   LDL Cholesterol Calculated Latest Ref Range: <=100 mg/dL 114 (H)   Non HDL Cholesterol Latest Ref Range: <130 mg/dL 175 (H)   T4 Free Latest Ref Range: 0.76 - 1.46 ng/dL 1.16   Triglycerides Latest Ref Range: <150 mg/dL 306 (H)   TSH Latest Ref Range: 0.40 - 4.00 mU/L 6.22 (H)   WBC Latest Ref Range: 4.0 - 11.0 10e3/uL 12.9 (H)   Hemoglobin Latest Ref Range: 11.7 - 15.7 g/dL 12.9   Hematocrit Latest Ref Range: 35.0 - 47.0 % 38.5   Platelet Count Latest Ref Range: 150 - 450 10e3/uL 249   RBC Count Latest Ref Range: 3.80 - 5.20 10e6/uL 4.27   MCV Latest Ref Range: 78 - 100 fL 90   MCH Latest Ref Range: 26.5 - 33.0 pg 30.2   MCHC Latest Ref Range: 31.5 - 36.5 g/dL 33.5   RDW Latest Ref Range: 10.0 - 15.0 % 12.7   % Neutrophils Latest Units: % 76   % Lymphocytes Latest Units: % 12   % Monocytes Latest Units: % 7   % Eosinophils Latest Units: % 3   % Basophils Latest Units: % 1   Absolute Basophils Latest Ref Range: 0.0 - 0.2 10e3/uL 0.1   Absolute Eosinophils Latest Ref Range: 0.0 - 0.7 10e3/uL 0.4   Absolute Immature Granulocytes Latest Ref Range: <=0.4 10e3/uL 0.1   Absolute Lymphocytes Latest Ref Range: 0.8 - 5.3 10e3/uL 1.6   Absolute Monocytes Latest Ref Range: 0.0 - 1.3 10e3/uL 0.9   % Immature Granulocytes Latest Units: % 1   Absolute Neutrophils Latest Ref Range: 1.6 - 8.3 10e3/uL 9.9 (H)   Absolute NRBCs Latest Units: 10e3/uL 0.0   NRBCs per 100 WBC Latest Ref Range: <1 /100 0

## 2022-03-04 NOTE — PROGRESS NOTES
NOC Shift Report    Pt on status 15 min checks. Pt appears to be sleeping 4.25. Up on and off throughout the night for snacks and to use the bathroom. No prn given. Will continue to monitor.

## 2022-03-04 NOTE — PLAN OF CARE
"Initial Psychosocial Assessment    I have reviewed the chart, met with the patient, and developed Care Plan.  Information for assessment was obtained from:     Patient and chart review. Patient did not make eye contact during the interview. She stated she did not feel well and thinks she has a virus. She appeared confused at times, was not able to tell writer the years of her previous psychiatric admissions. Patient was calm during the interview.    Presenting Problem:  Maddy Ortiz is a 36 years old, female, prefers the pronouns she/her. She is her own guardian and admitted to unit 32 voluntarily.    She presented to the emergency department at [facility] via car (friend dropped her off), with difficulty thinking from a med change, and abdominal pain.  She has a history of inpatient psychiatric admissions, schizoaffective disorder bipolar type, and type 2 diabetes, and an umbilical hernia.     \"She reports olfactory hallucinations, symptoms of depression and anxiety and suicidal ideation. She reports her mental health has been decompensating for months and reports in January she was medically admitted but never transferred to psychiatric care ( see records in Epic 1/8/22).\" (ED note, [facility].)     She has olfactory hallucinations of different smells in different rooms in her apartment.    Patient denies SI and SIB.    History of Mental Health and Chemical Dependency:  Schizoaffective Disorder, bipolar type, with multiple episodes currently in acute episode   Generalized Anxiety Disorder     Patient's psychotic symptoms began at the age of 26. She said she became paranoid of her neighbors and called the crisis line numerous times. (from 7/28/21 records from [facility]).    Family Description (Constellation, Family Psychiatric History):  Patient has a father that lives in [place] (mother passed away approximately 1.5 years ago), and 3 brothers. She has a 14 year old daughter. Patient " stated she is close with two brothers, and her father.     Significant Life Events (Illness, Abuse, Trauma, Death):  Patient stated she is grieving the death of her mother.    Living Situation:  Patient lives with her daughter in an apartment in [place], [place]. She said her daughter is temporarily living with someone else, due to her illness. Patient asked to use the computer, because she needed to complete a form for her daughter, so that her daughter can use the bus for school.    Educational Background:  Patient completed highschool.    Occupational History:  Patient is unemployed due to her mental illness. She was not able to elaborate.    Financial Status:  Patient stated she is on disability.    Legal Issues:  None. Patient is admitted voluntary.    Ethnic/Cultural Issues:  None reported.    Spiritual Orientation:  None reported.     Service History:  None reported.    Social Functioning (organization, interests):  Patient stated she enjoys spending time with her daughter when she is feeling well.    Current Treatment Providers are:  Patient stated her psychiatrist:  KAYLA GONZALES  MSN, APRN, CNP, PMHNP-BC, PHN  [facility]  [email or web address]    Social Service Assessment/Plan:  Patient's symptoms will be observed, stabilized, and symptoms reduced, medication management, group therapy, and CTC case management.

## 2022-03-04 NOTE — PHARMACY-ADMISSION MEDICATION HISTORY
Admission Medication History Completed by Pharmacy    See [facility] Admission Navigator for prior to admission medications.     Medication History Sources:     Maddy    Dispense report    Changes made to PTA medication list (reason):    Added: EpiPen    Deleted: naproxen, Lamisil AT    Changed: lithium -> 600 mg PO BID    Additional Information:    Maddy verified her home medications.    Dispense report:  o Gabapentin 300 mg last filled 02/04/2022 for quantity 90 for 30 day supply    Prior to Admission medications    Medication Sig Last Dose Taking? Auth Provider   albuterol (PROAIR HFA/PROVENTIL HFA/VENTOLIN HFA) 108 (90 Base) MCG/ACT inhaler Inhale 2 puffs into the lungs every 4 hours as needed for wheezing or shortness of breath / dyspnea  Yes Rudy Shin MD   ammonium lactate (LAC-HYDRIN) 12 % external cream Apply topically 2 times daily as needed for dry skin  Yes Rachid Garza DPM   benztropine (COGENTIN) 0.5 MG tablet Take 1 tablet (0.5 mg) by mouth daily as needed  Yes Justina Gonzalez MD   EPINEPHrine (ANY BX GENERIC EQUIV) 0.3 MG/0.3ML injection 2-pack Inject 0.3 mg into the muscle as needed for anaphylaxis  Yes Unknown, Entered By History   gabapentin (NEURONTIN) 300 MG capsule Take 1 capsule (300 mg) by mouth 3 times daily 3/2/2022 at Unknown time Yes Krishna Olivia MD   hydrOXYzine (ATARAX) 25 MG tablet Take 1 tablet by mouth nightly as needed  Yes Reported, Patient   levothyroxine (SYNTHROID/LEVOTHROID) 25 MCG tablet Take 1 tablet (25 mcg) by mouth every morning (before breakfast) Past Week at am Yes Justina Gonzalez MD   lithium ER (LITHOBID) 300 MG CR tablet Take 600 mg by mouth 2 times daily  3/2/2022 at am Yes Justina Gonzalez MD   Melatonin 10 MG TABS tablet Take 10 mg by mouth nightly as needed for sleep   Yes Reported, Patient   metFORMIN (GLUCOPHAGE-XR) 500 MG 24 hr tablet Take 2 tablets (1,000 mg) by mouth 2 times daily (with meals) 3/2/2022 at Unknown time Yes David Vivar MD    mineral oil-hydrophilic petrolatum (AQUAPHOR) external ointment Apply topically daily as needed for dry skin (apply to dry skin and rash around umbilical hernia)  Yes Reported, Patient   risperiDONE (RISPERDAL) 0.5 MG tablet Take 0.5 mg by mouth 2 times daily  3/2/2022 at am Yes Reported, Patient       The information provided in this note is only as accurate as the sources available at the time of the update(s).    Date completed: 03/03/22    Medication history completed by: Sridevi Rust [facility]
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
## 2022-03-04 NOTE — PROGRESS NOTES
SPIRITUAL HEALTH SERVICES  SPIRITUAL ASSESSMENT Progress Note  [facility] ([facility]) [address]     REFERRAL SOURCE: I did visit patient Maddy twice per Epic consult order. I introduced myself as the unit  and shared all the info about the [facility]. However, on my both visit attempts pt was not interested for the  support at this moment. Pt was in her room reading books but when I talked to her she didn't even seen my eyes while I was talking to her. I also informed the unit staff about it. Pt is not ready for one to one  visit today.    PLAN: I will remain open to provide spiritual care for the pt as needed.    Gustavo Peterosn M.Div. ([place]), M.Th., D.Min., [facility]  Staff   Pager [phone]

## 2022-03-05 LAB
BASOPHILS # BLD AUTO: 0.1 10E3/UL (ref 0–0.2)
BASOPHILS NFR BLD AUTO: 1 %
C PNEUM DNA SPEC QL NAA+PROBE: NOT DETECTED
EOSINOPHIL # BLD AUTO: 0.4 10E3/UL (ref 0–0.7)
EOSINOPHIL NFR BLD AUTO: 4 %
ERYTHROCYTE [DISTWIDTH] IN BLOOD BY AUTOMATED COUNT: 12.6 % (ref 10–15)
FLUAV H1 2009 PAND RNA SPEC QL NAA+PROBE: NOT DETECTED
FLUAV H1 RNA SPEC QL NAA+PROBE: NOT DETECTED
FLUAV H3 RNA SPEC QL NAA+PROBE: NOT DETECTED
FLUAV RNA SPEC QL NAA+PROBE: NOT DETECTED
FLUBV RNA SPEC QL NAA+PROBE: NOT DETECTED
GLUCOSE BLDC GLUCOMTR-MCNC: 128 MG/DL (ref 70–99)
GLUCOSE BLDC GLUCOMTR-MCNC: 150 MG/DL (ref 70–99)
GLUCOSE BLDC GLUCOMTR-MCNC: 165 MG/DL (ref 70–99)
GLUCOSE BLDC GLUCOMTR-MCNC: 202 MG/DL (ref 70–99)
HADV DNA SPEC QL NAA+PROBE: NOT DETECTED
HCOV PNL SPEC NAA+PROBE: DETECTED
HCT VFR BLD AUTO: 37.4 % (ref 35–47)
HGB BLD-MCNC: 12.3 G/DL (ref 11.7–15.7)
HMPV RNA SPEC QL NAA+PROBE: NOT DETECTED
HPIV1 RNA SPEC QL NAA+PROBE: NOT DETECTED
HPIV2 RNA SPEC QL NAA+PROBE: NOT DETECTED
HPIV3 RNA SPEC QL NAA+PROBE: NOT DETECTED
HPIV4 RNA SPEC QL NAA+PROBE: NOT DETECTED
IMM GRANULOCYTES # BLD: 0.1 10E3/UL
IMM GRANULOCYTES NFR BLD: 1 %
LYMPHOCYTES # BLD AUTO: 1.9 10E3/UL (ref 0.8–5.3)
LYMPHOCYTES NFR BLD AUTO: 17 %
M PNEUMO DNA SPEC QL NAA+PROBE: NOT DETECTED
MCH RBC QN AUTO: 29.6 PG (ref 26.5–33)
MCHC RBC AUTO-ENTMCNC: 32.9 G/DL (ref 31.5–36.5)
MCV RBC AUTO: 90 FL (ref 78–100)
MONOCYTES # BLD AUTO: 1 10E3/UL (ref 0–1.3)
MONOCYTES NFR BLD AUTO: 9 %
NEUTROPHILS # BLD AUTO: 8 10E3/UL (ref 1.6–8.3)
NEUTROPHILS NFR BLD AUTO: 68 %
NRBC # BLD AUTO: 0 10E3/UL
NRBC BLD AUTO-RTO: 0 /100
PLATELET # BLD AUTO: 249 10E3/UL (ref 150–450)
RBC # BLD AUTO: 4.15 10E6/UL (ref 3.8–5.2)
RSV RNA SPEC QL NAA+PROBE: NOT DETECTED
RSV RNA SPEC QL NAA+PROBE: NOT DETECTED
RV+EV RNA SPEC QL NAA+PROBE: NOT DETECTED
WBC # BLD AUTO: 11.5 10E3/UL (ref 4–11)

## 2022-03-05 PROCEDURE — 85025 COMPLETE CBC W/AUTO DIFF WBC: CPT | Performed by: PSYCHIATRY & NEUROLOGY

## 2022-03-05 PROCEDURE — 250N000013 HC RX MED GY IP 250 OP 250 PS 637: Performed by: PSYCHIATRY & NEUROLOGY

## 2022-03-05 PROCEDURE — 36415 COLL VENOUS BLD VENIPUNCTURE: CPT | Performed by: PSYCHIATRY & NEUROLOGY

## 2022-03-05 PROCEDURE — 87486 CHLMYD PNEUM DNA AMP PROBE: CPT | Performed by: PHYSICIAN ASSISTANT

## 2022-03-05 PROCEDURE — 124N000002 HC R&B MH UMMC

## 2022-03-05 PROCEDURE — 250N000013 HC RX MED GY IP 250 OP 250 PS 637: Performed by: PHYSICIAN ASSISTANT

## 2022-03-05 PROCEDURE — 250N000013 HC RX MED GY IP 250 OP 250 PS 637: Performed by: NURSE PRACTITIONER

## 2022-03-05 RX ADMIN — METFORMIN HYDROCHLORIDE 1000 MG: 500 TABLET, EXTENDED RELEASE ORAL at 18:39

## 2022-03-05 RX ADMIN — LITHIUM CARBONATE 600 MG: 300 TABLET, EXTENDED RELEASE ORAL at 08:37

## 2022-03-05 RX ADMIN — ACETAMINOPHEN 650 MG: 325 TABLET, FILM COATED ORAL at 11:49

## 2022-03-05 RX ADMIN — ACETAMINOPHEN 650 MG: 325 TABLET, FILM COATED ORAL at 18:40

## 2022-03-05 RX ADMIN — GABAPENTIN 300 MG: 300 CAPSULE ORAL at 08:37

## 2022-03-05 RX ADMIN — RISPERIDONE 1 MG: 1 TABLET ORAL at 22:07

## 2022-03-05 RX ADMIN — GABAPENTIN 300 MG: 300 CAPSULE ORAL at 22:07

## 2022-03-05 RX ADMIN — RISPERIDONE 0.5 MG: 0.5 TABLET ORAL at 08:37

## 2022-03-05 RX ADMIN — METFORMIN HYDROCHLORIDE 1000 MG: 500 TABLET, EXTENDED RELEASE ORAL at 08:37

## 2022-03-05 RX ADMIN — LEVOTHYROXINE SODIUM 50 MCG: 25 TABLET ORAL at 08:37

## 2022-03-05 RX ADMIN — LITHIUM CARBONATE 600 MG: 300 TABLET, EXTENDED RELEASE ORAL at 22:07

## 2022-03-05 RX ADMIN — GABAPENTIN 300 MG: 300 CAPSULE ORAL at 14:01

## 2022-03-05 ASSESSMENT — ACTIVITIES OF DAILY LIVING (ADL)
DRESS: INDEPENDENT
LAUNDRY: WITH SUPERVISION
ORAL_HYGIENE: INDEPENDENT
HYGIENE/GROOMING: INDEPENDENT

## 2022-03-05 NOTE — PLAN OF CARE
Pt appeared to sleep  5.5 hours. Pt out of room to get water twice, appeared disorganized and mumbled to self before returning to room. No concerns reported. Safety checks done at least every 15 minutes.

## 2022-03-05 NOTE — PLAN OF CARE
"  Problem: Anxiety  Goal: Anxiety Reduction or Resolution  Outcome: Ongoing, Not Progressing   Goal Outcome Evaluation:    Plan of Care Reviewed With: patient      Patient approached writer after group and stated \"I feel like my anxiety is not being respected.\"  She was unable to clarify what she meant by this statement but did indicate her anxiety level was high and wondered if scheduled and prn medication might be helpful.  Patient then received HS medication plus PRN Hydroxyzine 25 mg and PRN Melatonin 5 mg.  Patient reported \"If I feel  like other people don't agree with the way I think I get very anxious\" although noted the verbal group she'd just attended was helpful in challenging negative thoughts.  She reported sleeping most of the day and feeling attacked by negative intrusive thoughts while awake.  When questioned she admitted these thoughts were about ex-bf and his family. Patient became more agitated during this conversation but was able to calm herself.  Patient agreed to notify RN in one hour if there was no improvement after taking above meds and discuss the possibility of taking PRN zyprexa.   She denied SI, HI, SIB urges, A/V hallucinations, depression, and medication side effects. When I inquired about olfactory hallucinations, patient said she thought she smelled cigarette smoke coming from her toilet after housekeeping cleaned it.   Patient received PRN tylenol for generalized body aches after she ate dinner and reported partial relief.  BGs 152 and 241.  Patient requested to defer covid, urine, and fecal tests until tomorrow.  She was noted to be distractable and required prompts to stay on topic during all interactions this evening.  /83   Pulse 105   Temp 98.2  F (36.8  C) (Oral)   Resp 17   Ht 1.626 m (5' 4\")   Wt 112.6 kg (248 lb 3.2 oz)   SpO2 94%   BMI 42.60 kg/m               "

## 2022-03-05 NOTE — PLAN OF CARE
"Goal Outcome Evaluation:    Plan of Care Reviewed With: patient      RN Note:    Patient up early this morning. 0800 BG = 202. Patient reports eating snacks prior to checking BG. Patient complaining of feeling bloated and stomach ache 6/10. Patient reports having 2 \"loose\" stools yesterday and 1 today. Denies nausea/vomiting and is afebrile. Patient continues to complain of anxiety, reports PRN hydroxyzine was effective last night. When asked about her anxiety, patient stated \"it is just so hard to talk about when everyone compares me to my family members.\" Patient reports writing in her journal has been helpful in learning coping strategies. Patient declined multiple offers of PRN intervention to aid in symptom management.     Patient presents with Restricted affect and depressed mood. Patient was calm and cooperative during this shift. Patient denies  SI, SIB, HI, and AVH. No statements about her ex-boyfriend or families safety/well being. Patient has been observed to be withdrawn. She avoids social interaction and eye contact. She is eating 100% of meals. Patient is med-compliant. No medication side effects observed or endorsed by patient.    Viral Panel NP swab collected. Patient reminded to use urine collection cup during next void.    1200 BG = 150. Patient continues to report generalized body aches 6/10, requests and received PRN Tylenol 650 mg. Patient also complaining of feeling fatigued and was observed sleeping comfortably in her room after lunch.    /79 (BP Location: Left arm, Patient Position: Sitting)   Pulse 111   Temp 98.3  F (36.8  C) (Oral)   Resp 16   Ht 1.626 m (5' 4\")   Wt 112.6 kg (248 lb 3.2 oz)   SpO2 96%   BMI 42.60 kg/m               "

## 2022-03-05 NOTE — PROGRESS NOTES
03/04/22 1900   Group Therapy Session   Group Attendance    (yes. made some drug related comments)   Time Session Began    (8:15)   Time Session Ended    (9 pm)   Total Time patient participated (minutes)    (45)   Total # Attendees 3   Group Type psychotherapeutic   Group Topic Covered ACT leaves on a stream meditation and mindfulness question prompts   Group Session Detail    (Art offered during psychotherapy conversation, all pts  declined)   Patient Response/Contribution Hyper verbal, calms with prompts to meditate and slow her brain   Patient Response Detail See below   Pt very hyper verbal with side effects worries, smell in her room, social media, her daughter, her conversation was all over the place. Writer directed a calming mediation and reminded her several times to come back to it. When she did she would close her eyes and breathe and calm for a short time. She was able to listen to others and not interrupt but when she spoke, she spoke run on sentences and tangential thoughts.

## 2022-03-06 LAB
AMPHETAMINES UR QL SCN: NORMAL
BARBITURATES UR QL: NORMAL
BENZODIAZ UR QL: NORMAL
CANNABINOIDS UR QL SCN: NORMAL
COCAINE UR QL: NORMAL
ETHANOL UR QL SCN: NORMAL
GLUCOSE BLDC GLUCOMTR-MCNC: 119 MG/DL (ref 70–99)
GLUCOSE BLDC GLUCOMTR-MCNC: 126 MG/DL (ref 70–99)
GLUCOSE BLDC GLUCOMTR-MCNC: 139 MG/DL (ref 70–99)
GLUCOSE BLDC GLUCOMTR-MCNC: 162 MG/DL (ref 70–99)
OPIATES UR QL SCN: NORMAL

## 2022-03-06 PROCEDURE — 250N000013 HC RX MED GY IP 250 OP 250 PS 637: Performed by: PHYSICIAN ASSISTANT

## 2022-03-06 PROCEDURE — 250N000013 HC RX MED GY IP 250 OP 250 PS 637: Performed by: NURSE PRACTITIONER

## 2022-03-06 PROCEDURE — G0177 OPPS/PHP; TRAIN & EDUC SERV: HCPCS

## 2022-03-06 PROCEDURE — 250N000013 HC RX MED GY IP 250 OP 250 PS 637: Performed by: PSYCHIATRY & NEUROLOGY

## 2022-03-06 PROCEDURE — 90853 GROUP PSYCHOTHERAPY: CPT

## 2022-03-06 PROCEDURE — 124N000002 HC R&B MH UMMC

## 2022-03-06 PROCEDURE — 80307 DRUG TEST PRSMV CHEM ANLYZR: CPT | Performed by: PSYCHIATRY & NEUROLOGY

## 2022-03-06 RX ADMIN — LEVOTHYROXINE SODIUM 50 MCG: 25 TABLET ORAL at 07:47

## 2022-03-06 RX ADMIN — RISPERIDONE 0.5 MG: 0.5 TABLET ORAL at 07:47

## 2022-03-06 RX ADMIN — METFORMIN HYDROCHLORIDE 1000 MG: 500 TABLET, EXTENDED RELEASE ORAL at 17:19

## 2022-03-06 RX ADMIN — LITHIUM CARBONATE 600 MG: 300 TABLET, EXTENDED RELEASE ORAL at 20:54

## 2022-03-06 RX ADMIN — GABAPENTIN 300 MG: 300 CAPSULE ORAL at 14:24

## 2022-03-06 RX ADMIN — GABAPENTIN 300 MG: 300 CAPSULE ORAL at 20:54

## 2022-03-06 RX ADMIN — GABAPENTIN 300 MG: 300 CAPSULE ORAL at 07:47

## 2022-03-06 RX ADMIN — METFORMIN HYDROCHLORIDE 1000 MG: 500 TABLET, EXTENDED RELEASE ORAL at 07:47

## 2022-03-06 RX ADMIN — ACETAMINOPHEN 650 MG: 325 TABLET, FILM COATED ORAL at 07:48

## 2022-03-06 RX ADMIN — ACETAMINOPHEN 650 MG: 325 TABLET, FILM COATED ORAL at 12:21

## 2022-03-06 RX ADMIN — RISPERIDONE 1 MG: 1 TABLET ORAL at 20:54

## 2022-03-06 RX ADMIN — LITHIUM CARBONATE 600 MG: 300 TABLET, EXTENDED RELEASE ORAL at 07:47

## 2022-03-06 ASSESSMENT — ACTIVITIES OF DAILY LIVING (ADL)
DRESS: INDEPENDENT
DRESS: INDEPENDENT
HYGIENE/GROOMING: INDEPENDENT
HYGIENE/GROOMING: INDEPENDENT
LAUNDRY: WITH SUPERVISION
LAUNDRY: WITH SUPERVISION
ORAL_HYGIENE: INDEPENDENT
ORAL_HYGIENE: INDEPENDENT

## 2022-03-06 NOTE — PLAN OF CARE
"  Problem: Thought Process Alteration  Goal: Optimal Thought Clarity  Outcome: Ongoing, Progressing   Goal Outcome Evaluation:      Patient more alert tonight.  Thinking is more organized and less scattered, less hesitation before answers given to questions.  She was able to make phone calls and have coherent conversations.  Affect less baffled  Patient denied SI, as well as HI, A/V hallucinations, depression, anxiety, SIB urges, medication side effects and  new physical problems.  She did receive tylenol for c/o generalized body aches.  Patient reported \"I've bee trying to figure out my Dad's side of the family, and I  think my ex's family thinks I'm doing better than I am because they don't know about all the damage Accutane did to my brain.\"  Maintenance had to unclog patient's toilet.  She denied flushing any foreign objects but objected to the nicotine smell when she flushed.  Patient asked about using staff toilet instead but changed her mind when told staff would have to stand outside the entire time.  She was satisfied with our mutually agreed upon plan to flush assisted through excretion process and then use M9 air freshener kept at desk.  Patient does exhibit some awareness of how snack intake affects her blood glucose, changing her mind after requesting snacks.  BGs this evening 165 and 128.  /84 (BP Location: Right arm, Patient Position: Sitting, Cuff Size: Adult Regular)   Pulse 102   Temp 97.4  F (36.3  C) (Oral)   Resp 18   Ht 1.626 m (5' 4\")   Wt 112.6 kg (248 lb 3.2 oz)   SpO2 97%   BMI 42.60 kg/m                        "

## 2022-03-06 NOTE — PLAN OF CARE
Pt appeared to sleep 6 hours. No concerns reported. Pt up once to lounge to get water and sat in lounge quietly for approximately 20 minutes. Pt did not engage with staff. Safety checks done at least every 15 minutes.

## 2022-03-06 NOTE — PROGRESS NOTES
03/05/22 2200   Group Therapy Session   Group Attendance attended group session   Total Time patient participated (minutes) 60   Total # Attendees 4   Group Type expressive therapy   Group Topic Covered emotions/expression   Patient Response/Contribution cooperative with task   Art Therapy directive was to create a personal shield as a symbol of personal strength and/or protection and safety.  Goals of directive: emotional expression, identifying personal strengths, trauma containment  Other Art Therapy directive suggested was to create an image of a safe place.  Goals of directive: trauma containment, emotional expression     Pt was an active participant, focused on task for the full hour that pt attended group. Pt finished and briefly shared her shield with group. Pt went from one topic to the next while explaining her work and it was difficult to understand her process. Pt said that the pattern on one side of the shield (a tiger/cat pattern) reminded her of a memory from her childhood and then she began talking about how she was bullied beginning at a young age. Pt seemed to veer away from the themes of her artwork and would begin talking about other topics. Pts mood was calm.

## 2022-03-06 NOTE — PLAN OF CARE
03/06/22 1500   Group Therapy Session   Group Attendance attended group session   Group Type psychotherapeutic   Group Topic Covered balanced lifestyle   Patient Response/Contribution cooperative with task     Maddy  attended a Life Skills group this afternoon that involved sharing reflections according to question prompts. Thoughtful and agreeable. Discussed her use of the Calm russell to support her mental health.

## 2022-03-06 NOTE — PLAN OF CARE
"Goal Outcome Evaluation:    Plan of Care Reviewed With: patient      RN Note:    Patient presents with normal range affect and  calm  mood. Patient was calm, cooperative, and pleasant during this shift. Patient denies  SI, SIB, HI, and AVH. Patient reports generalized body aches 3/10, requests and received PRN Tylenol 650 mg x 2 this shift. Patient was observed resting in bed or journaling in the dining room. She remains withdrawn to herself and does not interact with other peers on the unit. Patient reports her cold symptoms and anxiety have improved. Patient showing improvement insight to her mental illness stating \"I know that the people in magazines are not connected to my family in any way. It is similar to when I thought people on the TV were trying to tell me something.\" Patient is med-compliant and attended afternoon group. No medication side effects observed or endorsed by patient.      /84 (BP Location: Right arm, Patient Position: Sitting, Cuff Size: Adult Regular)   Pulse 102   Temp 97.1  F (36.2  C) (Tympanic)   Resp 18   Ht 1.626 m (5' 4\")   Wt 113.9 kg (251 lb 3.2 oz)   SpO2 96%   BMI 43.12 kg/m               "

## 2022-03-07 LAB
GLUCOSE BLDC GLUCOMTR-MCNC: 106 MG/DL (ref 70–99)
GLUCOSE BLDC GLUCOMTR-MCNC: 142 MG/DL (ref 70–99)
GLUCOSE BLDC GLUCOMTR-MCNC: 143 MG/DL (ref 70–99)
GLUCOSE BLDC GLUCOMTR-MCNC: 156 MG/DL (ref 70–99)
GLUCOSE BLDC GLUCOMTR-MCNC: 178 MG/DL (ref 70–99)

## 2022-03-07 PROCEDURE — 250N000013 HC RX MED GY IP 250 OP 250 PS 637: Performed by: PSYCHIATRY & NEUROLOGY

## 2022-03-07 PROCEDURE — 99233 SBSQ HOSP IP/OBS HIGH 50: CPT | Performed by: PSYCHIATRY & NEUROLOGY

## 2022-03-07 PROCEDURE — 250N000013 HC RX MED GY IP 250 OP 250 PS 637: Performed by: PHYSICIAN ASSISTANT

## 2022-03-07 PROCEDURE — H2032 ACTIVITY THERAPY, PER 15 MIN: HCPCS

## 2022-03-07 PROCEDURE — 124N000002 HC R&B MH UMMC

## 2022-03-07 RX ORDER — RISPERIDONE 2 MG/1
2 TABLET ORAL AT BEDTIME
Status: DISCONTINUED | OUTPATIENT
Start: 2022-03-07 | End: 2022-03-15

## 2022-03-07 RX ADMIN — METFORMIN HYDROCHLORIDE 1000 MG: 500 TABLET, EXTENDED RELEASE ORAL at 19:58

## 2022-03-07 RX ADMIN — LITHIUM CARBONATE 600 MG: 300 TABLET, EXTENDED RELEASE ORAL at 08:46

## 2022-03-07 RX ADMIN — LEVOTHYROXINE SODIUM 50 MCG: 25 TABLET ORAL at 08:46

## 2022-03-07 RX ADMIN — RISPERIDONE 2 MG: 2 TABLET ORAL at 21:29

## 2022-03-07 RX ADMIN — GABAPENTIN 300 MG: 300 CAPSULE ORAL at 14:30

## 2022-03-07 RX ADMIN — GABAPENTIN 300 MG: 300 CAPSULE ORAL at 08:46

## 2022-03-07 RX ADMIN — METFORMIN HYDROCHLORIDE 1000 MG: 500 TABLET, EXTENDED RELEASE ORAL at 08:45

## 2022-03-07 RX ADMIN — RISPERIDONE 0.5 MG: 0.5 TABLET ORAL at 08:46

## 2022-03-07 RX ADMIN — LITHIUM CARBONATE 600 MG: 300 TABLET, EXTENDED RELEASE ORAL at 19:58

## 2022-03-07 RX ADMIN — GABAPENTIN 300 MG: 300 CAPSULE ORAL at 19:58

## 2022-03-07 ASSESSMENT — ACTIVITIES OF DAILY LIVING (ADL)
LAUNDRY: WITH SUPERVISION
DRESS: INDEPENDENT
DRESS: INDEPENDENT
ORAL_HYGIENE: INDEPENDENT
ORAL_HYGIENE: INDEPENDENT
HYGIENE/GROOMING: INDEPENDENT
HYGIENE/GROOMING: INDEPENDENT

## 2022-03-07 NOTE — PLAN OF CARE
"Goal Outcome Evaluation:    Plan of Care Reviewed With: patient     RN Note:    Patient presents with Blunted/Flat affect and  calm  mood. Patient was calm and cooperative during this shift. Patient denies  SI, SIB, HI, and AVH. Patient denies  pain. Patient was observed resting/napping in bed for a majority of the shift. Patient reports feeling fatigued and drowsy and believes it is due to the medications. Patient requested to have Risperdal rescheduled to bedtime. She also reports her thoughts are more clear. During 1:1 check in, patient is more organized and linear. Patient reports she was still having \"loose\" bowel movements yesterday, but has no had one today. Patient did not attend groups. Patient is med-compliant. No medication side effects observed or endorsed by patient.    Blood Sugars this shift:    0830 = 143  1220 = 106      /84   Pulse 99   Temp 97.3  F (36.3  C) (Temporal)   Resp 16   Ht 1.626 m (5' 4\")   Wt 113.9 kg (251 lb 3.2 oz)   SpO2 94%   BMI 43.12 kg/m                "

## 2022-03-07 NOTE — PROGRESS NOTES
"[facility], [place]   Psychiatric Progress Note  Hospital Day: 4        Interim History:   The patient's care was discussed with the treatment team during the daily team meeting and/or staff's chart notes were reviewed.  Staff report patient has been visible in the milieu, attending some groups, improving in organization, having more insight, taking medications as prescribed, continues to have several somatic concerns, no acute events over weekend.     Upon interview, the patient was noted to be tearful in milieu, agreed to meet in group room, she states she has been feeling more upset and having more crying spells and unsure if this is because she stopped a medication and started another at a low dose. Writer did dicsuss how medication changes can leave one vulnerable for an increase in symptoms and recommended increasing risperidone and moving dose all to bedtime due to tiredness patient has also reported, she was in agreement with this. She also states she is worried about her family members, she got more tangential and difficult to follow about wanting to get counseling before supporting her family but she believes \"Family Planning\" is best for her niece as she would not be making these choices other wise as her boyfriend is not her sexual preference due to patients observation of nieces art and pictures. Redirected her back to treatment plan, she requests some time to be on the computer to pay her rent and communicate with her landlord so she does not get evicted. She reports the concern of her daughter riding bus home with friend has been resolved. She denies having any SI or HI, denies AVH. Sleeping and eating okay, did not make any statements of physical health concerns.          Medications:       gabapentin  300 mg Oral TID     levothyroxine  50 mcg Oral QAM AC     lithium ER  600 mg Oral BID     metFORMIN  1,000 mg Oral BID w/meals     risperiDONE  0.5 mg Oral Daily     " risperiDONE  1 mg Oral QPM          Allergies:     Allergies   Allergen Reactions     Dust Mites Shortness Of Breath     Animal Dander      Other reaction(s): *Unknown     Metformin Fatigue     Patient is taking at home as of 3/3/22     Mold      Other reaction(s): Runny Nose     Trees           Labs:     Recent Results (from the past 48 hour(s))   Glucose by meter    Collection Time: 03/05/22  8:29 AM   Result Value Ref Range    GLUCOSE BY METER POCT 202 (H) 70 - 99 mg/dL   Respiratory Panel PCR - NP Swab    Collection Time: 03/05/22  9:59 AM    Specimen: Nasopharyngeal; Swab   Result Value Ref Range    Adenovirus Not Detected Not Detected    Coronavirus Detected (A) Not Detected    Human Metapneumovirus Not Detected Not Detected    Human Rhin/Enterovirus Not Detected Not Detected    Influenza A Not Detected Not Detected    Influenza A, H1 Not Detected Not Detected    Influenza A 2009 H1N1 Not Detected Not Detected    Influenza A, H3 Not Detected Not Detected    Influenza B Not Detected Not Detected    Parainfluenza Virus 1 Not Detected Not Detected    Parainfluenza Virus 2 Not Detected Not Detected    Parainfluenza Virus 3 Not Detected Not Detected    Parainfluenza Virus 4 Not Detected Not Detected    Respiratory Syncytial Virus A Not Detected Not Detected    Respiratory Syncytial Virus B Not Detected Not Detected    Chlamydia Pneumoniae Not Detected Not Detected    Mycoplasma Pneumoniae Not Detected Not Detected   Glucose by meter    Collection Time: 03/05/22 12:39 PM   Result Value Ref Range    GLUCOSE BY METER POCT 150 (H) 70 - 99 mg/dL   Glucose by meter    Collection Time: 03/05/22  6:11 PM   Result Value Ref Range    GLUCOSE BY METER POCT 165 (H) 70 - 99 mg/dL   Glucose by meter    Collection Time: 03/05/22 10:10 PM   Result Value Ref Range    GLUCOSE BY METER POCT 128 (H) 70 - 99 mg/dL   Glucose by meter    Collection Time: 03/06/22  7:55 AM   Result Value Ref Range    GLUCOSE BY METER POCT 139 (H) 70 - 99  "mg/dL   Drug abuse screen 6 urine (chem dep) ([facility])    Collection Time: 03/06/22 11:59 AM   Result Value Ref Range    Amphetamines Urine Screen Negative Screen Negative    Barbiturates Urine Screen Negative Screen Negative    Benzodiazepines Urine Screen Negative Screen Negative    Cannabinoids Urine Screen Negative Screen Negative    Cocaine Urine Screen Negative Screen Negative    Ethanol Urine Screen Negative Screen Negative    Opiates Urine Screen Negative Screen Negative   Glucose by meter    Collection Time: 03/06/22 12:16 PM   Result Value Ref Range    GLUCOSE BY METER POCT 126 (H) 70 - 99 mg/dL   Glucose by meter    Collection Time: 03/06/22  5:14 PM   Result Value Ref Range    GLUCOSE BY METER POCT 162 (H) 70 - 99 mg/dL   Glucose by meter    Collection Time: 03/06/22  9:42 PM   Result Value Ref Range    GLUCOSE BY METER POCT 119 (H) 70 - 99 mg/dL   Glucose by meter    Collection Time: 03/07/22  4:57 AM   Result Value Ref Range    GLUCOSE BY METER POCT 142 (H) 70 - 99 mg/dL          Psychiatric Examination:     /84   Pulse 99   Temp 97.6  F (36.4  C) (Oral)   Resp 16   Ht 1.626 m (5' 4\")   Wt 113.9 kg (251 lb 3.2 oz)   SpO2 100%   BMI 43.12 kg/m    Weight is 251 lbs 3.2 oz  Body mass index is 43.12 kg/m .    Orthostatic Vitals  Report      Most Recent      Sitting Orthostatic /79 03/06 0835    Sitting Orthostatic Pulse (bpm) 98 03/06 0835    Standing Orthostatic /86 03/06 0835    Standing Orthostatic Pulse (bpm) 104 03/06 0835        Appearance: awake, alert, dressed in hospital scrubs and untidy  Attitude:  a bit guarded at times and somewhat cooperative  Eye Contact:  poor, intermittent, often closing eyes when talking  Mood:  anxious and depressed  Affect:  full range, a little labile, tearful at times, smiling at others  Speech:  clear, coherent and normal prosody  Language: fluent and intact in English  Psychomotor, Gait, Musculoskeletal:  no evidence of tardive dyskinesia, " dystonia, or tics  Thought Process:  more tangential as interview progressed  Associations:  no loose associations  Thought Content:  no evidence of suicidal ideation or homicidal ideation and denies AVH, did not appear responding, some paranoia/delusional content remains  Insight:  partial, improving  Judgement:  limited  Oriented to:  time, person, and place  Attention Span and Concentration:  limited, improving  Recent and Remote Memory:  appears intact to conversation  Fund of Knowledge:  appropriate    Clinical Global Impressions  First:  Considering your total clinical experience with this particular patient population, how severe are the patient's symptoms at this time?: 7 (03/04/22 1628)  Compared to the patient's condition at the START of treatment, this patient's condition is: 4 (03/04/22 1628)  Most recent:  Considering your total clinical experience with this particular patient population, how severe are the patient's symptoms at this time?: 7 (03/04/22 1628)  Compared to the patient's condition at the START of treatment, this patient's condition is: 4 (03/04/22 1628)           Precautions:     Behavioral Orders   Procedures     Code 1 - Restrict to Unit     Routine Programming     As clinically indicated     Status 15     Every 15 minutes.          Diagnoses:   Schizoaffective Disorder, bipolar type, with multiple episodes currently in acute episode with mixed mood state and active psychosis symptoms  Generalized Anxiety Disorder   Diabetes Mellitus, type 2  Hypothyroidism  Paranoid type Delusion disorder (H)  Asthma          Assessment & Plan:   Assessment and hospital summary:  This patient is a 36 year old female with history of schizoaffective disorder, bipolar type, generalized anxiety disorder, and diabetes who presented to ED with disorganized thinking and abdominal pain in context of medication change from Vraylar to Risperdal. She does have mandeep-umbilical hernia. Her symptoms of disorganized  thinking, paranoia and delusions, along with depressive symptoms including feelings of worthlessness, helplessness, low mood, impaired concentration decreased energy and irrititability are most consistant with an exacerbation of her Schizoaffective Disorder, bipolar type, with multiple episodes currently in acute episode.      Patient denies knowing of any family history of mental illness. Urine Toxicology has not been collected but patient denies substance abuse. Her recent medication change coupled with her poor medication adherence are likely the predominant factors contributing to her current presentation. Her lack of insight, poor medication adherence, and lack of close family support complicate and limit patient's prognosis of full remission of symptoms.      Regarding goals of hospitalization: Pt wants the learn about the thoughts that lead to her behaviors.      Continued home medications of lithium and Risperdal iincreased Risperdal to 0.5 mg am and 1 mg pm to further target psychosis. Continued lithium at PTA dose, Lithium level currently sub-therapeutic on admission but this is likely related to poor compliance as opposed to insufficient dose as she has been therapeutic at this dose in the past. Continued Gabapentin 300 mg TID for anxiety.      Inpatient psychiatric hospitalization is warranted at this time for safety, stabilization, and possible adjustment in medications.    Psychiatric treatment/inteventions:  Medications:   -Continue PTA Gabapentin 300 MG TID for anxiety   -Continue PTA Lithium  MG BID, Lithium level 0.4 on admission in setting of poor medication adherence, recheck lithium level  3/9/22  -Increase PTA Risperdal to 2mg for psychosis and move entire dose to bedtime due to tiredeness/sedation   -Continue PTA Cogentin 0.5 mg daily PRN for EPSE  -Continue PTA Melatonin 10 mg PRN insomnia      Other PRN medications to include:   -Hydroxyzine 25mg every 4 hours PRN for  anxiety  -Trazodone 50mg at bedtime PRN for Insomnia   -Zyprexa 10mg PO/IM TID PRN for agitation/psychosis       Laboratory/Imaging: Lithium level ordered for 3/9     Patient will be treated in therapeutic milieu with appropriate individual and group therapies as described.     Medical treatment/interventions:  Medical concerns:   1) Diabetes Type 2  -Hgb A1c 8.4 on 1/8/22  -Patient does not take home FSBG  -Continue PTA Metformin  -FSBG BID  -Nutrition consult      2) Asthma  -Pt denies concerns but has nasal congestion and pharyngitis.   -Monitor for worsening asthma symptoms   -PTA albuterol inhaler      3) Hernia   -CT abdomen shows mandeep umbilical hernia   -Evaluated in ED, please see ED provider note for details  -Pain mgmt with tylenol  -Monitor for worsening      4) Hypothyroidism  -TSH 6.22 and T4 1.16 on 3/4/22  -Patient inconsistant with medication   -Continue home Synthroid 25 mcg     5) Possible UTI  -UA 3/3/21 suspicious for UTI  -Patient asymptomatic  - culture results show mixed urogenital spencer     6) Leukocytosis  -Elevated WBC on 3/3 and 3/4 (12.1 and 12.9 respectively), with chart review patient has had elevated WBCs for several months  -Possibly related to Lithium use  -Repeat CBC 3/5   -IM consulted, notes indicate they will continue to follow, will contact IM for update 3/8.      7) URI  -Nasal congestion and pharyngitis  -Strep negative  -Influenza nevative  -Pt viral panel positive for general coronavirus, was negtiave for Covid-19 when test  -Supportive cares       This note was created by undersigned using a Dragon dictation system. All typing errors or contextual distortion are unintentional and software inherent.     Disposition Plan   Reason for ongoing admission: is unable to care for self due to severe psychosis or sofía  Discharge location: TBD, likely home  Discharge Medications: not ordered  Follow-up Appointments: not scheduled  Legal Status: voluntary  Entered by: Juliette  Rodrigo on 3/7/2022 at 6:50 AM

## 2022-03-07 NOTE — PLAN OF CARE
Pt requested blood glucose check. Pt stated she felt weird. While checking pt's blood sugar, pt stated she just wanted to have a snack after sample taken. This writer returned machine to base, came  back to talk to pt and she was eating a banana she had found. Pt stated she was going to discuss her diet with provider this am. Pt stated she was asymptotic after blood sugar check and result was 142. Pt has flat affect with minimal interaction with staff.

## 2022-03-07 NOTE — PLAN OF CARE
Problem: Thought Process Alteration  Goal: Optimal Thought Clarity  Outcome: Ongoing, Progressing   Goal Outcome Evaluation:    Plan of Care Reviewed With: patient        Patient doing ok, during this shift, she is alert and oriented. Patient seems to isolate self every now and again. Patient has been calm cooperative, and appropriate. Patient mood is ok, flat affect. Patient was noted to be incontinent of bladder, she stated that she did have an accident, requested for new briefs. Patient denies pain, depression and anxiety during this shift, she denies SI/SIB/HI and AVH. Patient's blood sugar was 162 and 119. Patient ate well, she is medication compliant, no stated side effects. Will continue to monitor patient.

## 2022-03-07 NOTE — PLAN OF CARE
Pt appeared to sleep 4.5 hours. Safety checks done at least every 15 minutes.

## 2022-03-07 NOTE — PLAN OF CARE
"[facility] [address]  Occupational Therapy Behavioral Health Assessment    Patient Name: Maddy Ortiz    Pt attended a short portion of OT clinic this morning; after ~10 minutes she reported feeling tired and returned to her room.    Description: OT staff met with pt to review the role of occupational therapy and explain the value of having them involved in their treatment plan including options to meet current needs/self-identified goals. The below evaluation is a compilation of functional performance observation and information obtained from an OT self-assessment.     When asked about the reason for the current hospitalization, pt stated \"I have been on meds and therapy since 2007, for 14.5 years, and I do not feel happy about this because I'm starting to feel like I'm being used rather than supported since I've been subjected to so much physical, emotional, and other side effects and have not been allowed to do what I want.\"    Pt endorsed an understanding of symptoms on function. Pt identified experiencing the following symptoms, selected from checklist provided:     Feelings - sadness, anxiety / fear, anger / rage, mood swings,despair, overwhelmed     Thoughts - negative,  memory problems, making decisions (\"not being allowed to make decisions\"), slowed thinking, obsessive, self-harm, suicidal    Behaviors -problems with relationship(s),  hostile reactions to others, compulsive behaviors, financial concerns / spending problems, victim of abuse or crime,    Pt identifies as goals for this hospitalization:    Find resources and support (treatment, therapy, support groups, housing, etc)  Learn to make choices and take action  Improve problem solving skills  Manage stress    Pt identified the following supports outside the hospital: \"family\"    Pt identified the following personal strengths to support her recovery: \"I tend to be proactive about my obsessive thoughts.\"      Assessment: Patient would benefit from " continue engagement in OT groups that support healthy recovery, specifically exploration of positive coping skills for symptom management/relapse prevention.    Plan: Initiate care plan goals and interventions.      Plan for Next Treatment: Provide graded occupation-based activities for increased success and ongoing assessment.     Corbin Jaramillo, OT on 3/7/2022 at 2:40 PM       03/07/22 1400   General Information   Date Initially Attended OT 03/07/22   Clinical Impression   Affect Appropriate to situation;Flat   Orientation Oriented to person, place and time   Appearance and ADLs General cleanliness observed in most areas   Attention to Internal Stimuli Appears distracted/preoccupied internally   Interaction Skills Interacts appropriately with staff;Interacts appropriately with peers   Ability to Communicate Needs Does so with prompts   Verbal Content Articulate;Appropriate to topic   Ability to Maintain Boundaries Maintains appropriate physical boundaries;Maintains appropriate verbal boundaries   Participation Independently participates   Concentration Concentrates 10-20 minutes   Ability to Concentrate With structure   Follows and Comprehends Directions Independently follows 2 step verbal directions   Memory Needs further assessment   Organization Independently organizes simple tasks   Decision Making Independent   Planning and Problem Solving Needs further assessment   Ability to Apply and Learn Concepts Applies within group structure   Frustrations / Stress Tolerance Independently identifies and applies coping skills   Level of Insight Identifies needs with structure/support   Self Esteem Takes risks with support and encouragement   Social Supports Has knowledge of support systems

## 2022-03-07 NOTE — PLAN OF CARE
Problem: Thought Process Alteration  Goal: Optimal Thought Clarity  Outcome: Ongoing, Progressing     Pt presented as alert and oriented to place and self throughout shift.  She was intermittently visible in the milieu throughout the evening watching tv with her peers or otherwise spending time in the room resting any relaxing.  She ate meals and is medication compliant.  Pt utilized her 20 minutes of computer time before dinner this evening.  Pt has a visitor this evening which appeared to have a heated discussion, however the visitor left pt on good terms.  She was dressed appropriately and speech was coherent.  Pt denied SI/HI/SIB and AVH.  Pt denied any acute physical health concerns, pain or side effects to medications this shift.

## 2022-03-07 NOTE — PLAN OF CARE
Number of patients attending the group:  6  Group Length:  0.5 Hours    Group Therapy     Summary of Group / Topics Discussed:    The psychotherapy group goal is to promote insight to positive choice and change. Group processing is within a supportive and safe environment. Patients processed emotions using verbal group and expressive psychotherapy interventions.    This group focused on specific strategies to maintain safety in a tensed environment, and ways to communicate their needs around perceived distress. Reviewed several ways to use communication to identify areas of needs, seek help with professionals and support systems. Patient and group members discussed helpful strategies they have used in the past to prevent symptoms exacerbation. Also discussed ways to support one another recognizing that everyone is going through a stressful time.    Received positive feedback from facilitator. Also discussed ways to write down what they would like to discuss with their treatment team tomorrow so they do not forget (group members requested strategies to prevent forgetting this).      Assessment: Pt reported she is struggling with thoughts of paranoia.       Patient Response: Pt participated for the entire duration of the team. Provided feedback to others.

## 2022-03-07 NOTE — PROGRESS NOTES
SPIRITUAL HEALTH SERVICES  SPIRITUAL ASSESSMENT Progress Note  [facility] ([facility] [address]     REFERRAL SOURCE: I did visit this morning patient Maddy per follow up  visit. Pt was writing something when I spoke with her. Pt appreciated my presence but rather than talking to me she prefer to continue writing what she started. I did respect her action and before I left her alone, I told her that, if she need one to one the  spiritual support that I always available for her and she agreed on that.     PLAN: I will remain open to provide spiritual care for the pt as needed.    Gustavo Peterson M.Div. ([place]), M.Th., D.Min., [facility]  Staff   Pager [phone]

## 2022-03-07 NOTE — PLAN OF CARE
Tasks Complete:    CTC attempted to meet with pt to discuss care coordination and discharge planning. However, pt was asleep and CTC could not meet at this time. CTC will attempt tomorrow morning    Post round meeting with team @9:30 am updates: Team discussed current symptoms and medication management.    Current Symptoms include the following: Patient presents with Blunted/Flat affect and  calm  mood. Patient was calm and cooperative during this shift. Patient denies  SI, SIB, HI, and AVH. Patient denies  pain.    Addressed patient needs/concerns: PT did not note any concerns at this time    Discharge Plan or Goal   Plan  Likely discharge home with outpatient services    Care Team   Shirley Gooden MSN, APRN, CNP, PMHNP-BC, PHN- [address], [place] [phone]     Barriers to Discharge   Ongoing stabilization     Referral Status  No referrals needed at this time.     Legal Status  Voluntary

## 2022-03-08 LAB
GLUCOSE BLDC GLUCOMTR-MCNC: 141 MG/DL (ref 70–99)
GLUCOSE BLDC GLUCOMTR-MCNC: 156 MG/DL (ref 70–99)
GLUCOSE BLDC GLUCOMTR-MCNC: 174 MG/DL (ref 70–99)
GLUCOSE BLDC GLUCOMTR-MCNC: 190 MG/DL (ref 70–99)

## 2022-03-08 PROCEDURE — 250N000013 HC RX MED GY IP 250 OP 250 PS 637: Performed by: NURSE PRACTITIONER

## 2022-03-08 PROCEDURE — 124N000002 HC R&B MH UMMC

## 2022-03-08 PROCEDURE — 250N000013 HC RX MED GY IP 250 OP 250 PS 637: Performed by: PSYCHIATRY & NEUROLOGY

## 2022-03-08 PROCEDURE — 99207 PR NO CHARGE LOS: CPT | Performed by: PHYSICIAN ASSISTANT

## 2022-03-08 PROCEDURE — 250N000013 HC RX MED GY IP 250 OP 250 PS 637: Performed by: PHYSICIAN ASSISTANT

## 2022-03-08 PROCEDURE — 90853 GROUP PSYCHOTHERAPY: CPT

## 2022-03-08 PROCEDURE — 99233 SBSQ HOSP IP/OBS HIGH 50: CPT | Performed by: PSYCHIATRY & NEUROLOGY

## 2022-03-08 RX ADMIN — METFORMIN HYDROCHLORIDE 1000 MG: 500 TABLET, EXTENDED RELEASE ORAL at 17:47

## 2022-03-08 RX ADMIN — LITHIUM CARBONATE 600 MG: 300 TABLET, EXTENDED RELEASE ORAL at 20:16

## 2022-03-08 RX ADMIN — RISPERIDONE 2 MG: 2 TABLET ORAL at 20:17

## 2022-03-08 RX ADMIN — GABAPENTIN 300 MG: 300 CAPSULE ORAL at 08:00

## 2022-03-08 RX ADMIN — GABAPENTIN 300 MG: 300 CAPSULE ORAL at 20:16

## 2022-03-08 RX ADMIN — GABAPENTIN 300 MG: 300 CAPSULE ORAL at 14:02

## 2022-03-08 RX ADMIN — METFORMIN HYDROCHLORIDE 1000 MG: 500 TABLET, EXTENDED RELEASE ORAL at 07:59

## 2022-03-08 RX ADMIN — HYDROXYZINE HYDROCHLORIDE 25 MG: 25 TABLET, FILM COATED ORAL at 15:54

## 2022-03-08 RX ADMIN — LITHIUM CARBONATE 600 MG: 300 TABLET, EXTENDED RELEASE ORAL at 08:00

## 2022-03-08 RX ADMIN — LEVOTHYROXINE SODIUM 50 MCG: 25 TABLET ORAL at 08:00

## 2022-03-08 ASSESSMENT — ACTIVITIES OF DAILY LIVING (ADL)
LAUNDRY: WITH SUPERVISION
DRESS: INDEPENDENT
HYGIENE/GROOMING: INDEPENDENT
ORAL_HYGIENE: INDEPENDENT
LAUNDRY: WITH SUPERVISION
HYGIENE/GROOMING: INDEPENDENT
ORAL_HYGIENE: INDEPENDENT
DRESS: INDEPENDENT

## 2022-03-08 NOTE — PROGRESS NOTES
03/07/22 2100   Group Therapy Session   Group Attendance attended group session   Total Time patient participated (minutes) 30   Total # Attendees 2   Group Type recreation   Group Topic Covered leisure exploration/use of leisure time   Patient Response/Contribution cooperative with task     Pt attended the Therapeutic Recreation group this evening, participating for approximately x30 minutes. Pt was a very quiet participant to start group, then seemed to brighten up and become more engaged during the group activity. Pt was sociable and interactive as part of the game. Pt abruptly decided to leave group to eat a snack and said she was not returning back to group.

## 2022-03-08 NOTE — PLAN OF CARE
03/08/22 1418   Group Therapy Session   Group Attendance attended group session   Time Session Began 1310   Time Session Ended 1405   Total Time patient participated (minutes) 55   Total # Attendees 3   Group Type psychoeducation   Group Topic Covered relationship   Group Session Detail Discussed relationships, boundaries, and traits. Watched the relationship tree by [employer] video. Discussed the video, the group members shared areas in their lives with which it resonated and the struggle of figuring out what relationships are 'roots' and which are 'leaves or branches'. Used the bullseye diagram to discuss levels of intimacy and boundaries.       Patient Response/Contribution cooperative with task;discussed personal experience with topic   Patient Response Detail Pt participated in group, often focused on relationships as role and task oriented rather than relational.

## 2022-03-08 NOTE — PROGRESS NOTES
"[facility], [place]   Psychiatric Progress Note  Hospital Day: 5        Interim History:   The patient's care was discussed with the treatment team during the daily team meeting and/or staff's chart notes were reviewed.  Staff report patient has been visible in the milieu, attending some groups, abruptly left group last evening, had visitor and had heated discussion per staff observation, taking medications as prescribed, no acute events overnight.     Upon interview, pt reports she feels she is improving, her mood is more stable, less crying spells. She is tolerating moving the risperidone to bedtime, less sedation today. She continues to report having \"intense smells\" which she attributes to possibly being on lithium, states again these smells are only in her apartment and in her room which led to her needing to sleep in her daughters room which caused some conflict between them due to her daughter being a teenager and wanting her space. She went into a long tangent about \"being different\" and people not letting her do things because of this and that her daughter is \"out of the nest\" soon and she doesn't want to spend her time in \"solitary confinement\". Attempted to process that daughter still has time at home but patient responded that she is \"out of the nest mentally\" due to \"wanting to play video games and be with her friends\". She expressed feeling unsupported by her family. Redirected back to progress and treatment plan, she denies any safety concerns today, denies SI or HI, denies AVH. She denies having ideas of reference. She is hoping to discharge \"soon\" as her symptoms are stabilizing, provided feedback she has been improving but not quite ready. Reminded her she has lithium level ordered for AM and she expressed understanding. She asked about using computer to address a \"charge\" on her card that was placed due to her being in the hospital and not being able to cancel the " charge, [facility] plans to help patient address.          Medications:       gabapentin  300 mg Oral TID     levothyroxine  50 mcg Oral QAM AC     lithium ER  600 mg Oral BID     metFORMIN  1,000 mg Oral BID w/meals     risperiDONE  2 mg Oral At Bedtime          Allergies:     Allergies   Allergen Reactions     Dust Mites Shortness Of Breath     Animal Dander      Other reaction(s): *Unknown     Metformin Fatigue     Patient is taking at home as of 3/3/22     Mold      Other reaction(s): Runny Nose     Trees           Labs:     Recent Results (from the past 48 hour(s))   Glucose by meter    Collection Time: 03/06/22  7:55 AM   Result Value Ref Range    GLUCOSE BY METER POCT 139 (H) 70 - 99 mg/dL   Drug abuse screen 6 urine (chem dep) ([facility])    Collection Time: 03/06/22 11:59 AM   Result Value Ref Range    Amphetamines Urine Screen Negative Screen Negative    Barbiturates Urine Screen Negative Screen Negative    Benzodiazepines Urine Screen Negative Screen Negative    Cannabinoids Urine Screen Negative Screen Negative    Cocaine Urine Screen Negative Screen Negative    Ethanol Urine Screen Negative Screen Negative    Opiates Urine Screen Negative Screen Negative   Glucose by meter    Collection Time: 03/06/22 12:16 PM   Result Value Ref Range    GLUCOSE BY METER POCT 126 (H) 70 - 99 mg/dL   Glucose by meter    Collection Time: 03/06/22  5:14 PM   Result Value Ref Range    GLUCOSE BY METER POCT 162 (H) 70 - 99 mg/dL   Glucose by meter    Collection Time: 03/06/22  9:42 PM   Result Value Ref Range    GLUCOSE BY METER POCT 119 (H) 70 - 99 mg/dL   Glucose by meter    Collection Time: 03/07/22  4:57 AM   Result Value Ref Range    GLUCOSE BY METER POCT 142 (H) 70 - 99 mg/dL   Glucose by meter    Collection Time: 03/07/22  8:29 AM   Result Value Ref Range    GLUCOSE BY METER POCT 143 (H) 70 - 99 mg/dL   Glucose by meter    Collection Time: 03/07/22 12:20 PM   Result Value Ref Range    GLUCOSE BY METER POCT 106 (H) 70 - 99 mg/dL  "  Glucose by meter    Collection Time: 03/07/22  6:06 PM   Result Value Ref Range    GLUCOSE BY METER POCT 178 (H) 70 - 99 mg/dL   Glucose by meter    Collection Time: 03/07/22 10:46 PM   Result Value Ref Range    GLUCOSE BY METER POCT 156 (H) 70 - 99 mg/dL          Psychiatric Examination:     /84   Pulse 99   Temp 98.9  F (37.2  C) (Temporal)   Resp 16   Ht 1.626 m (5' 4\")   Wt 113.9 kg (251 lb 3.2 oz)   SpO2 95%   BMI 43.12 kg/m    Weight is 251 lbs 3.2 oz  Body mass index is 43.12 kg/m .    Orthostatic Vitals  Report      Most Recent      Sitting Orthostatic /79 03/08 0740    Sitting Orthostatic Pulse (bpm) 94 03/08 0740    Standing Orthostatic /70 03/08 0740    Standing Orthostatic Pulse (bpm) 115 03/08 0740        Appearance: awake, alert, dressed in hospital scrubs and untidy  Attitude:  somewhat cooperative  Eye Contact:  poor, intermittent, often closing eyes when talking or looking away from writer  Mood:  anxious and depressed, improving  Affect:  full range, continues to be a bit labile  Speech:  clear, coherent and normal prosody, loud volume  Language: fluent and intact in English  Psychomotor, Gait, Musculoskeletal:  no evidence of tardive dyskinesia, dystonia, or tics  Thought Process:  more tangential as interview progressed  Associations:  no loose associations  Thought Content:  no evidence of suicidal ideation or homicidal ideation and denies AVH, did not appear responding, some paranoia/delusional content remains  Insight:  partial, improving  Judgement:  limited  Oriented to:  time, person, and place  Attention Span and Concentration:  limited, improving  Recent and Remote Memory:  appears intact to conversation  Fund of Knowledge:  appropriate    Clinical Global Impressions  First:  Considering your total clinical experience with this particular patient population, how severe are the patient's symptoms at this time?: 7 (03/04/22 9837)  Compared to the patient's " condition at the START of treatment, this patient's condition is: 4 (03/04/22 1628)  Most recent:  Considering your total clinical experience with this particular patient population, how severe are the patient's symptoms at this time?: 7 (03/04/22 1628)  Compared to the patient's condition at the START of treatment, this patient's condition is: 4 (03/04/22 1628)           Precautions:     Behavioral Orders   Procedures     Code 1 - Restrict to Unit     Routine Programming     As clinically indicated     Status 15     Every 15 minutes.          Diagnoses:   Schizoaffective Disorder, bipolar type, with multiple episodes currently in acute episode with mixed mood state and active psychosis symptoms  Generalized Anxiety Disorder   Diabetes Mellitus, type 2  Hypothyroidism  Paranoid type Delusion disorder (H)  Asthma   Chronic leukocytosis          Assessment & Plan:   Assessment and hospital summary:  This patient is a 36 year old female with history of schizoaffective disorder, bipolar type, generalized anxiety disorder, and diabetes who presented to ED with disorganized thinking and abdominal pain in context of medication change from Vraylar to Risperdal. She does have mandeep-umbilical hernia. Her symptoms of disorganized thinking, paranoia and delusions, along with depressive symptoms including feelings of worthlessness, helplessness, low mood, impaired concentration decreased energy and irrititability are most consistant with an exacerbation of her Schizoaffective Disorder, bipolar type, with multiple episodes currently in acute episode.      Patient denies knowing of any family history of mental illness. Urine Toxicology has not been collected but patient denies substance abuse. Her recent medication change coupled with her poor medication adherence are likely the predominant factors contributing to her current presentation. Her lack of insight, poor medication adherence, and lack of close family support complicate  and limit patient's prognosis of full remission of symptoms.      Regarding goals of hospitalization: Pt wants the learn about the thoughts that lead to her behaviors.      Continued home medications of lithium and Risperdal iincreased Risperdal to 0.5 mg am and 1 mg pm to further target psychosis. Continued lithium at PTA dose, Lithium level currently sub-therapeutic on admission but this is likely related to poor compliance as opposed to insufficient dose as she has been therapeutic at this dose in the past. Continued Gabapentin 300 mg TID for anxiety.      Inpatient psychiatric hospitalization is warranted at this time for safety, stabilization, and possible adjustment in medications.    Psychiatric treatment/inteventions:  Medications:   -Continue PTA Gabapentin 300 MG TID for anxiety   -Continue PTA Lithium  MG BID, Lithium level 0.4 on admission in setting of poor medication adherence, recheck lithium level  3/9/22  -Continue PTA Risperdal at increased dose of 2mg at bedtime for psychosis, consider initiating Consta prior to discharge if patient agreeable   -Continue PTA Cogentin 0.5 mg daily PRN for EPSE  -Continue PTA Melatonin 10 mg PRN insomnia      Other PRN medications to include:   -Hydroxyzine 25mg every 4 hours PRN for anxiety  -Trazodone 50mg at bedtime PRN for Insomnia   -Zyprexa 10mg PO/IM TID PRN for agitation/psychosis       Laboratory/Imaging: Lithium level ordered for 3/9     Patient will be treated in therapeutic milieu with appropriate individual and group therapies as described.     Medical treatment/interventions:  Medical concerns:   1) Diabetes Type 2  -Hgb A1c 8.4 on 1/8/22  -Patient does not take home FSBG  -Continue PTA Metformin  -FSBG BID  -Nutrition consult      2) Asthma  -Pt denies concerns but has nasal congestion and pharyngitis.   -Monitor for worsening asthma symptoms   -PTA albuterol inhaler      3) Hernia   -CT abdomen shows mandeep umbilical hernia   -Evaluated in ED,  please see ED provider note for details  -Pain mgmt with tylenol  -Monitor for worsening      4) Hypothyroidism  -TSH 6.22 and T4 1.16 on 3/4/22  -Patient inconsistant with medication   -Continue home Synthroid 25 mcg     5) Possible UTI  -UA 3/3/21 suspicious for UTI  -Patient asymptomatic  - culture results show mixed urogenital spencer     6) Leukocytosis  -Elevated WBC on 3/3 and 3/4 (12.1 and 12.9 respectively), with chart review patient has had elevated WBCs for several months  -Possibly related to Lithium use  -Repeat CBC 3/5 showed ongoing elevated WBC  -IM consulted, notes indicate they will continue to follow, contacted IM for update and will place updated progress note 3/8     7) URI  -Nasal congestion and pharyngitis  -Strep negative  -Influenza nevative  -Pt viral panel positive for general coronavirus, was negtiave for Covid-19 when test  -Supportive cares       This note was created by undersigned using a Dragon dictation system. All typing errors or contextual distortion are unintentional and software inherent.     Disposition Plan   Reason for ongoing admission: is unable to care for self due to severe psychosis or sofía  Discharge location: TBD, likely home  Discharge Medications: not ordered  Follow-up Appointments: not scheduled  Legal Status: voluntary     Entered by: Juliette Wallace on 3/8/2022 at 6:56 AM

## 2022-03-08 NOTE — PLAN OF CARE
Appointments:   PT has upcoming appointments next week. No cancellation unless discharge date is determined. See below     3/14- Weight Mangament Surg- Dr. Fidelia Nunez via video @ 8:45am.-[facility] [place], [place]    3/17- Allergy Appointment- Sommer Shin MD in person on 3/17 at 11:30am    Tasks Complete:    CTC met with patient to gather collateral and resign appropriate TIMOTHY for providers. PT noted that she is being charged for a prescription she did not purchase and requested to use the computer to access the companies phone number. CTC monitored pt while she accessed the phone number. In addition pt, clarified upcoming appointments and providers. PT has two scheduled appointments ( see above)next week according to Epic. There is no cancellation of appointments until discharge date is confirmed. In addition, pt stated that she needed to call her financial worker to inform FW that she is not able to complete the paperwork while hospitalized. CTC assisted pt obtain phone number.       Post round meeting with team @9:30 am updates: Team discussed discharge planning     Current Symptoms include the following:     Addressed patient needs/concerns:   See above. Concerns were addressed    Discharge Plan or Goal   Plan  Likely discharge home with outpatient services    Care Team   Care Team    COLLEEN Roche CM- [facility] [place]- [phone]   Therapist- Callie Manley LP-[phone]  Psychiatrist-Shirley Gooden NP- [facility]-   Financial Worker- Elvira Bae- [facility]- [phone]  Outpatient DBT Group through [facility])     Barriers to Discharge   Ongoing stabilization     Referral Status  No referrals needed at this time     Legal Status  Voluntary

## 2022-03-08 NOTE — PLAN OF CARE
Pt appeared to sleep 5 hours. Pt up to lounge twice on night shift getting water.  No concerns reported. Safety checks done at least every 15 minutes.

## 2022-03-08 NOTE — PLAN OF CARE
Goal Outcome Evaluation:    The patient spent time sitting by herself in the milieu and napping in her room. Had c/o that she has high anxiety and was given prn Hydroxyzine. She talked about feeling pressured that she has a lot of medical decisions to make in a short amount of time. Reviewed coping strategies with patient that she could utilize such as journaling and writing down her questions for the Doctor. Later patient reported Hydroxyzine effective in reducing her anxiety. She ate 100% of dinner. She did not attend the evening group. Was compliant with scheduled medications.     Blood sugars this shift: 190, 174    Prn's given this shift: Hydroxyzine

## 2022-03-08 NOTE — PLAN OF CARE
OCCUPATIONAL THERAPY PROGRESS NOTE:     03/08/22 1246   Group Therapy Session   Group Attendance attended group session   Time Session Began 0915   Time Session Ended 1015   Total Time patient participated (minutes) 10   Total # Attendees 2   Group Type expressive therapy   Group Topic Covered cognitive activities;emotions/expression   Group Session Detail Creative Expression, task selection, planning and problem solving   Patient Response/Contribution discussed personal experience with topic;requested more information about topic;other (see comments)  (left group due to drowsiness)   Patient Response Detail With structure and assistance was decisive with creative task and began set up, however, noted she was drowsy and unable to focus   Initiated group, responsive to inquiries and was able to follow through with simple decision making. Required assistance with set up and organization. Noted she was drowsy and needed to leave. Will continue to encourage and support consistent group attendance and participation for exploration of healthy self management.

## 2022-03-08 NOTE — PLAN OF CARE
Problem: Mood Impairment (Psychotic Signs/Symptoms)  Goal: Improved Mood Symptoms (Psychotic Signs/Symptoms)  Outcome: Ongoing, Progressing     Pt had a good shift, she was able to use the computer to access her bank account and pay bills. Pt was compliant with all medications. She reports that she has been feeling a lot better than when she first came to the hospital and has been getting adequate sleep. Affect was blunted, flat but pleasant and cooperative upon approach. Mood was calm. No SI/SIB noted or observed. Will continue to monitor.

## 2022-03-08 NOTE — PROGRESS NOTES
Brief Medicine Note    Medicine was following for leukocytosis. At the time of initial consult on 3/4/22 the patient also had several somatic complaints. Discussed with Dr. Wallace today, the patient has been medically stable and while she is still quite somatically-focused, her somatic complaints have subsided.     The patient appears to have a chronic leukocytosis and WBC has been at her baseline of ~11.5. I suspect this is related to her lithium use. There does not appear to be any other evidence concerning for an acute infectious process at this time.    Remainder of recommendations as per initial consult note dated 3/4/22.    Medicine will sign off. Please do not hesitate to contact if new questions or concerns arise.       Carolyn Farr PA-C  Hospitalist Service  Pager: [phone]

## 2022-03-09 LAB
GLUCOSE BLDC GLUCOMTR-MCNC: 114 MG/DL (ref 70–99)
GLUCOSE BLDC GLUCOMTR-MCNC: 119 MG/DL (ref 70–99)
GLUCOSE BLDC GLUCOMTR-MCNC: 144 MG/DL (ref 70–99)
GLUCOSE BLDC GLUCOMTR-MCNC: 150 MG/DL (ref 70–99)
GLUCOSE BLDC GLUCOMTR-MCNC: 92 MG/DL (ref 70–99)
LITHIUM SERPL-SCNC: 0.6 MMOL/L

## 2022-03-09 PROCEDURE — 250N000013 HC RX MED GY IP 250 OP 250 PS 637: Performed by: PSYCHIATRY & NEUROLOGY

## 2022-03-09 PROCEDURE — 124N000002 HC R&B MH UMMC

## 2022-03-09 PROCEDURE — 250N000013 HC RX MED GY IP 250 OP 250 PS 637: Performed by: PHYSICIAN ASSISTANT

## 2022-03-09 PROCEDURE — G0177 OPPS/PHP; TRAIN & EDUC SERV: HCPCS

## 2022-03-09 PROCEDURE — 36416 COLLJ CAPILLARY BLOOD SPEC: CPT | Performed by: PSYCHIATRY & NEUROLOGY

## 2022-03-09 PROCEDURE — 250N000013 HC RX MED GY IP 250 OP 250 PS 637: Performed by: NURSE PRACTITIONER

## 2022-03-09 PROCEDURE — 80178 ASSAY OF LITHIUM: CPT | Performed by: PSYCHIATRY & NEUROLOGY

## 2022-03-09 PROCEDURE — 90853 GROUP PSYCHOTHERAPY: CPT

## 2022-03-09 PROCEDURE — 99232 SBSQ HOSP IP/OBS MODERATE 35: CPT | Performed by: PSYCHIATRY & NEUROLOGY

## 2022-03-09 RX ORDER — RISPERIDONE 25 MG/2 ML
25 KIT INTRAMUSCULAR
Status: DISCONTINUED | OUTPATIENT
Start: 2022-03-10 | End: 2022-03-23

## 2022-03-09 RX ADMIN — METFORMIN HYDROCHLORIDE 1000 MG: 500 TABLET, EXTENDED RELEASE ORAL at 08:33

## 2022-03-09 RX ADMIN — GABAPENTIN 300 MG: 300 CAPSULE ORAL at 08:32

## 2022-03-09 RX ADMIN — LEVOTHYROXINE SODIUM 50 MCG: 25 TABLET ORAL at 08:33

## 2022-03-09 RX ADMIN — LITHIUM CARBONATE 600 MG: 300 TABLET, EXTENDED RELEASE ORAL at 21:10

## 2022-03-09 RX ADMIN — GABAPENTIN 300 MG: 300 CAPSULE ORAL at 21:10

## 2022-03-09 RX ADMIN — RISPERIDONE 2 MG: 2 TABLET ORAL at 21:42

## 2022-03-09 RX ADMIN — LITHIUM CARBONATE 600 MG: 300 TABLET, EXTENDED RELEASE ORAL at 08:33

## 2022-03-09 RX ADMIN — GABAPENTIN 300 MG: 300 CAPSULE ORAL at 13:47

## 2022-03-09 RX ADMIN — ACETAMINOPHEN 650 MG: 325 TABLET, FILM COATED ORAL at 16:03

## 2022-03-09 RX ADMIN — METFORMIN HYDROCHLORIDE 1000 MG: 500 TABLET, EXTENDED RELEASE ORAL at 17:43

## 2022-03-09 ASSESSMENT — ACTIVITIES OF DAILY LIVING (ADL)
HYGIENE/GROOMING: INDEPENDENT
LAUNDRY: WITH SUPERVISION
DRESS: SCRUBS (BEHAVIORAL HEALTH);INDEPENDENT
DRESS: SCRUBS (BEHAVIORAL HEALTH)
ORAL_HYGIENE: INDEPENDENT
HYGIENE/GROOMING: INDEPENDENT
ORAL_HYGIENE: INDEPENDENT

## 2022-03-09 NOTE — PLAN OF CARE
Appointments:   PT has upcoming appointments next week. No cancellation unless discharge date is determined. See below      3/14- [facility] Mangament Surg- Dr. Fidelia Nunez via video @ 8:45am.-[facility] [place], [place]     3/17- Allergy Appointment- Sommer Shin MD in person on 3/17 at 11:30am    Tasks Complete:    CTC return call to COLLEEN Silva CM regarding discharge plan. CTC left vm.     Post round meeting with team @9:30 am updates: Team discussed discharge planning.     Current Symptoms include the following:   Pt is observed attending groups this morning, pt endorses delusions and hallucinations    Addressed patient needs/concerns:   Pt did not express needs/concerns at this time    Discharge Plan or Goal   Plan  Likely discharge home with outpatient services     Care Team   Care Team    COLLEEN Roche CM- [facility]- [phone]   Therapist- Callie Manley LP-[phone]  Psychiatrist-Shirley Gooden NP- [facility]-   Financial Worker- Elvira Bae- [facility]- [phone]  Outpatient DBT Group through [facility] ( [place])     Barriers to Discharge   Ongoing stabilization     Referral Status  No referrals needed at this time     Legal Status  Voluntary

## 2022-03-09 NOTE — PLAN OF CARE
Goal Outcome Evaluation:    Plan of Care Reviewed With: patient        Reported abdominal tension rated at '5'/10 and was given prn tylenol. The patient reported having an ongoing cough and has a low grade fever 99.8 with intermittent complaints of body aches and fatigue. Infection prevention consulted and Internal Medicine was paged and recommended a non urgent consult to Internal Medicine so the patient could be seen for evaluation of viral symptoms and umbilicus hernia. On call provider Dr. Holly paged and ordered the Internal Medicine consult.     The patient has been sitting in her room and is withdrawn. Denied anxiety and depression. Said no SI or hallucinations and is agreeable to tell staff if she has any thoughts of hurting herself. Had a visitor this evening and her mood improved and her affect brightened. The patient attended the O.T. group. Was compliant with scheduled medications. Had a good appetite ate most of the dinner and snacks.     Prn's given this shift: Tylenol     Blood sugar's this shift: 150, 92 & 119

## 2022-03-09 NOTE — PROGRESS NOTES
[facility], [place]   Psychiatric Progress Note  Hospital Day: 6        Interim History:     The patient's care was discussed with the treatment team during the daily team meeting and/or staff's chart notes were reviewed.  Staff report patient has been more visible in the milieu, attending some groups, though, not for a full time of group, taking medications as prescribed, no acute events overnight. Subjectively, she reports feeling somewhat better, though, still highly anxious. Admits to still having some paranoia, thinks that she sometimes can tell what other people think.     Upon interview, pt was seen for Priscilla Reeves, NP who is off in presence of CNS and PA students. Maddy was asleep, but woke up and talked to us. Said that she was doing better. When asked what was better, said that she felt calmer, not as afraid as she was when was admitted. Appeared to be somewhat confused when we asked her about circumstances of her admission, then, said that she and her dad were concerned about cousin's relationship? She denied Auditory hallucinations, Visual hallucinations, Suicidal ideation, Homicidal thoughts. We suggested to start on Risperdal Consta, Maddy agreed with that, asked to give her injection tomorrow. She had no further questions or concerns. Today lab work was reviewed. Li level from 3/9/2022 was 0.6.         Medications:       gabapentin  300 mg Oral TID     levothyroxine  50 mcg Oral QAM AC     lithium ER  600 mg Oral BID     metFORMIN  1,000 mg Oral BID w/meals     risperiDONE  2 mg Oral At Bedtime     [START ON 3/10/2022] risperiDONE microspheres ER  25 mg Intramuscular Q14 Days          Allergies:     Allergies   Allergen Reactions     Dust Mites Shortness Of Breath     Animal Dander      Other reaction(s): *Unknown     Metformin Fatigue     Patient is taking at home as of 3/3/22     Mold      Other reaction(s): Runny Nose     Trees           Labs:     Recent Results (from  "the past 48 hour(s))   Glucose by meter    Collection Time: 03/07/22  6:06 PM   Result Value Ref Range    GLUCOSE BY METER POCT 178 (H) 70 - 99 mg/dL   Glucose by meter    Collection Time: 03/07/22 10:46 PM   Result Value Ref Range    GLUCOSE BY METER POCT 156 (H) 70 - 99 mg/dL   Glucose by meter    Collection Time: 03/08/22  8:08 AM   Result Value Ref Range    GLUCOSE BY METER POCT 156 (H) 70 - 99 mg/dL   Glucose by meter    Collection Time: 03/08/22 12:01 PM   Result Value Ref Range    GLUCOSE BY METER POCT 141 (H) 70 - 99 mg/dL   Glucose by meter    Collection Time: 03/08/22  5:43 PM   Result Value Ref Range    GLUCOSE BY METER POCT 174 (H) 70 - 99 mg/dL   Glucose by meter    Collection Time: 03/08/22  8:12 PM   Result Value Ref Range    GLUCOSE BY METER POCT 190 (H) 70 - 99 mg/dL   Lithium level    Collection Time: 03/09/22  7:55 AM   Result Value Ref Range    Lithium 0.6   mmol/L   Glucose by meter    Collection Time: 03/09/22  8:27 AM   Result Value Ref Range    GLUCOSE BY METER POCT 144 (H) 70 - 99 mg/dL   Glucose by meter    Collection Time: 03/09/22 12:36 PM   Result Value Ref Range    GLUCOSE BY METER POCT 114 (H) 70 - 99 mg/dL          Psychiatric Examination:     /79 (Patient Position: Sitting)   Pulse 101   Temp 97.5  F (36.4  C) (Oral)   Resp 16   Ht 1.626 m (5' 4\")   Wt 112.9 kg (249 lb)   SpO2 96%   BMI 42.74 kg/m    Weight is 249 lbs 0 oz  Body mass index is 42.74 kg/m .       Orthostatic Vitals  Report      Most Recent      Sitting Orthostatic /79 03/08 0740    Sitting Orthostatic Pulse (bpm) 94 03/08 0740    Standing Orthostatic /83 03/09 0830    Standing Orthostatic Pulse (bpm) 106 03/09 0830         Appearance: somewhat somnolent, dressed in hospital scrubs and untidy  Attitude:  somewhat cooperative  Eye Contact:  poor, intermittent, often closing eyes when talking or looking away from writer  Mood:  anxious and depressed, improving  Affect:  restricted, continues to be " a bit labile  Speech:  clear, coherent and normal prosody,   Language: fluent and intact in English  Psychomotor, Gait, Musculoskeletal:  no evidence of tardive dyskinesia, dystonia, or tics  Thought Process:  more tangential as interview progressed  Associations:  no loose associations  Thought Content:  no evidence of suicidal ideation or homicidal ideation and denies AVH, did not appear responding, some paranoia/delusional content remains  Insight:  partial, improving  Judgement:  limited, improving  Oriented to:  time, person, and place  Attention Span and Concentration:  limited, improving  Recent and Remote Memory:  appears intact to conversation  Fund of Knowledge:  appropriate    Clinical Global Impressions  First:  Considering your total clinical experience with this particular patient population, how severe are the patient's symptoms at this time?: 7 (03/04/22 1628)  Compared to the patient's condition at the START of treatment, this patient's condition is: 4 (03/04/22 1628)  Most recent:  Considering your total clinical experience with this particular patient population, how severe are the patient's symptoms at this time?: 7 (03/04/22 1628)  Compared to the patient's condition at the START of treatment, this patient's condition is: 4 (03/04/22 1628)           Precautions:     Behavioral Orders   Procedures     Code 1 - Restrict to Unit     Routine Programming     As clinically indicated     Status 15     Every 15 minutes.          Diagnoses:   Schizoaffective Disorder, bipolar type, with multiple episodes currently in acute episode with mixed mood state and active psychosis symptoms  Generalized Anxiety Disorder   Diabetes Mellitus, type 2  Hypothyroidism  Paranoid type Delusion disorder (H)  Asthma   Chronic leukocytosis          Assessment & Plan:   Assessment and hospital summary:  This patient is a 36 year old female with history of schizoaffective disorder, bipolar type, generalized anxiety disorder,  and diabetes who presented to ED with disorganized thinking and abdominal pain in context of medication change from Vraylar to Risperdal. She does have mandeep-umbilical hernia. Her symptoms of disorganized thinking, paranoia and delusions, along with depressive symptoms including feelings of worthlessness, helplessness, low mood, impaired concentration decreased energy and irrititability are most consistant with an exacerbation of her Schizoaffective Disorder, bipolar type, with multiple episodes currently in acute episode.      Patient denies knowing of any family history of mental illness. Urine Toxicology has not been collected but patient denies substance abuse. Her recent medication change coupled with her poor medication adherence caused hospitalization.    Treatment: will give first injection of Risperdal Consta 25 mg q2 weeks on 3/10/2022; will discuss tomorrow increasing Li dose. Will continue to provide support and structure. Will continue the rest of meds unchanged.      Disposition Plan   Reason for ongoing admission: is unable to care for self due to severe psychosis or sofía  Discharge location: TBD, likely home  Discharge Medications: not ordered  Follow-up Appointments: not scheduled  Legal Status: voluntary     Entered by: Trevon Motta on 3/9/2022 at 2:28 PM

## 2022-03-09 NOTE — PLAN OF CARE
Goal Outcome Evaluation:      Pt appeared to be a sleep @ all safety checks.  Pt slept 6 hours. She came out of her room and ate a snack.

## 2022-03-09 NOTE — PLAN OF CARE
"OT PROGRESS NOTE:      03/09/22 1059   Group Therapy Session   Group Attendance attended group session   Time Session Began 0915   Time Session Ended 1015   Total Time patient participated (minutes) 60   Total # Attendees 1   Group Type expressive therapy;life skill   Group Topic Covered emotions/expression;relationship   Group Session Detail during Creative Expression/executive skills conversed about relation ship issues   Patient Response/Contribution discussed personal experience with topic;cooperative with task;disorganized;expressed reluctance to alter behaviors   Patient Response Detail Tangential. difficulty with seeing her need for change but, points out others issues.     Initiated Creative Expression Group  X 60 minutes. Talked the entire session. Tangential and seemed paranoid. At one point noted she couldn't decide if she wanted to have another child or have gastric bypass surgery. \"Having a child is a great tax break.\" Talked extensively about classes at [employer] and feeling she was treated poorly because she was told she was being mean to other students. When asked when she last attended she noted in 2016. Talked of different groups of asians (foreign born and american born) and how the foreign born expect to be catered to and she called them out in class. Spoke of her child's father and his decision to return to [place] thus neglecting fatherly duties. When asked if she did want him involved she stated \"no\".  Will continue to discuss decision making, and the possibility of sharing her ideas with others before proceeding.  "

## 2022-03-09 NOTE — PLAN OF CARE
"  Problem: Behavior Regulation Impairment (Psychotic Signs/Symptoms)  Goal: Improved Behavioral Control (Psychotic Signs/Symptoms)  Outcome: Ongoing, Progressing   Goal Outcome Evaluation:    Plan of Care Reviewed With: patient      \"I'm doing better. I'm taking my meds.\" Depression is \"medium,\" anxiety is \"high\", racing thoughts have improved. Paranoia is present and has improved, \"I have delusions, I can tell what people are thinking sometimes.\" Attends some of the groups. Denies suicidal thoughts. Went to one group this morning. Ate breakfast and lunch. Has been sleeping in bed the rest of the shift.            "

## 2022-03-10 LAB
GLUCOSE BLDC GLUCOMTR-MCNC: 114 MG/DL (ref 70–99)
GLUCOSE BLDC GLUCOMTR-MCNC: 130 MG/DL (ref 70–99)
GLUCOSE BLDC GLUCOMTR-MCNC: 138 MG/DL (ref 70–99)
GLUCOSE BLDC GLUCOMTR-MCNC: 179 MG/DL (ref 70–99)

## 2022-03-10 PROCEDURE — 90853 GROUP PSYCHOTHERAPY: CPT

## 2022-03-10 PROCEDURE — 250N000013 HC RX MED GY IP 250 OP 250 PS 637: Performed by: PHYSICIAN ASSISTANT

## 2022-03-10 PROCEDURE — 99207 PR CONSULT E&M CHANGED TO INITIAL LEVEL: CPT | Performed by: PHYSICIAN ASSISTANT

## 2022-03-10 PROCEDURE — 99222 1ST HOSP IP/OBS MODERATE 55: CPT | Performed by: PHYSICIAN ASSISTANT

## 2022-03-10 PROCEDURE — 250N000013 HC RX MED GY IP 250 OP 250 PS 637: Performed by: PSYCHIATRY & NEUROLOGY

## 2022-03-10 PROCEDURE — 99232 SBSQ HOSP IP/OBS MODERATE 35: CPT | Performed by: PSYCHIATRY & NEUROLOGY

## 2022-03-10 PROCEDURE — 124N000002 HC R&B MH UMMC

## 2022-03-10 PROCEDURE — 250N000011 HC RX IP 250 OP 636: Performed by: PSYCHIATRY & NEUROLOGY

## 2022-03-10 RX ORDER — FLUTICASONE PROPIONATE 50 MCG
2 SPRAY, SUSPENSION (ML) NASAL DAILY
Status: DISCONTINUED | OUTPATIENT
Start: 2022-03-10 | End: 2022-03-30 | Stop reason: HOSPADM

## 2022-03-10 RX ORDER — CETIRIZINE HYDROCHLORIDE 10 MG/1
10 TABLET ORAL DAILY PRN
Status: DISCONTINUED | OUTPATIENT
Start: 2022-03-10 | End: 2022-03-11

## 2022-03-10 RX ADMIN — LITHIUM CARBONATE 600 MG: 300 TABLET, EXTENDED RELEASE ORAL at 20:04

## 2022-03-10 RX ADMIN — RISPERIDONE 25 MG: KIT at 11:10

## 2022-03-10 RX ADMIN — LITHIUM CARBONATE 600 MG: 300 TABLET, EXTENDED RELEASE ORAL at 08:38

## 2022-03-10 RX ADMIN — RISPERIDONE 2 MG: 2 TABLET ORAL at 21:05

## 2022-03-10 RX ADMIN — CETIRIZINE HYDROCHLORIDE 10 MG: 10 TABLET, FILM COATED ORAL at 12:06

## 2022-03-10 RX ADMIN — METFORMIN HYDROCHLORIDE 1000 MG: 500 TABLET, EXTENDED RELEASE ORAL at 17:39

## 2022-03-10 RX ADMIN — LEVOTHYROXINE SODIUM 50 MCG: 25 TABLET ORAL at 08:38

## 2022-03-10 RX ADMIN — FLUTICASONE PROPIONATE 2 SPRAY: 50 SPRAY, METERED NASAL at 12:07

## 2022-03-10 RX ADMIN — GABAPENTIN 300 MG: 300 CAPSULE ORAL at 20:04

## 2022-03-10 RX ADMIN — ALBUTEROL SULFATE 2 PUFF: 90 AEROSOL, METERED RESPIRATORY (INHALATION) at 17:38

## 2022-03-10 RX ADMIN — GABAPENTIN 300 MG: 300 CAPSULE ORAL at 13:21

## 2022-03-10 RX ADMIN — ALBUTEROL SULFATE 2 PUFF: 90 AEROSOL, METERED RESPIRATORY (INHALATION) at 12:07

## 2022-03-10 RX ADMIN — METFORMIN HYDROCHLORIDE 1000 MG: 500 TABLET, EXTENDED RELEASE ORAL at 08:38

## 2022-03-10 RX ADMIN — GABAPENTIN 300 MG: 300 CAPSULE ORAL at 08:38

## 2022-03-10 ASSESSMENT — ACTIVITIES OF DAILY LIVING (ADL)
LAUNDRY: WITH SUPERVISION
DRESS: INDEPENDENT
LAUNDRY: WITH SUPERVISION
ORAL_HYGIENE: INDEPENDENT
DRESS: INDEPENDENT
HYGIENE/GROOMING: INDEPENDENT
ORAL_HYGIENE: INDEPENDENT
HYGIENE/GROOMING: INDEPENDENT

## 2022-03-10 NOTE — PLAN OF CARE
"Goal Outcome Evaluation:    Plan of Care Reviewed With: patient      RN Note:    Patient presents with Blunted/Flat affect and anxious mood. Patient was calm and cooperative during this shift. Patient denies  SI, SIB, HI, and AVH. Patient states her thoughts are more clear and that she is a \"cautious women.\" Patient further explained that paranoia is from \"caution when put into a unfamiliar environment.\" Patient denies  pain. Patient was observed resting in bed for a majority of the shift. Patient observed making several phone calls to family members. Patient  did attend groups. Patient is med-compliant. No medication side effects observed or endorsed by patient.    Patient reports cough, nasal congestion, and body aches have improved. However, patient requested PRN zyrtec and albuterol. Patient reported relief after administration. Patient also drinking hot tea throughout shift - reported effective.    Incidents to note:    Patient received first dose of Risperdal Consta this morning. Tolerated well.  0800 BG = 130  1220 BG = 114    PRNs given this shift:    Zyrtec 10 mg - 1207  Albuterol 1206 for dyspnea    /83 (BP Location: Left arm)   Pulse 103   Temp 97.9  F (36.6  C) (Oral)   Resp 16   Ht 1.626 m (5' 4\")   Wt 114.1 kg (251 lb 8 oz)   SpO2 95%   BMI 43.17 kg/m    "

## 2022-03-10 NOTE — PLAN OF CARE
03/10/22 1421   Group Therapy Session   Group Attendance attended group session   Time Session Began 0115   Time Session Ended 0215   Total Time patient participated (minutes) 60   Total # Attendees 4   Group Type psychoeducation   Group Topic Covered cognitive therapy techniques;self-care activities;coping skills/lifestyle management;emotions/expression;balanced lifestyle   Patient Response/Contribution cooperative with task;discussed personal experience with topic;listened actively;expressed understanding of topic   Patient Response Detail PT enegaged when prompted. Pt stated that she listens to shlomo       Summary of Group / Topics Discussed:    The  Psychotherapy group goal is to promote insight to positive choice and change. Group processing is within a supportive and safe environment. Patients will process emotions using verbal group and expressive psychotherapy interventions.

## 2022-03-10 NOTE — PROGRESS NOTES
[facility], [place]   Psychiatric Progress Note  Hospital Day: 7        Interim History:     The patient's care was discussed with the treatment team during the daily team meeting and/or staff's chart notes were reviewed.  Staff report patient has been more visible in the milieu, attending some groups, she also spent some time in her bed, reported feeling tired. Today am got first injection of Risperdal Consta, tolerated it well. Yesterday was seen by IM due to complaints of cough, was offered Albuterol, Flonase and Zyprtec, but declined.    Upon interview, pt was seen with PA student. Maddy was in her room, bed. She sat up on her bed and talked to us. Said that she was doing better and wondered when she could be discharged home. We asked what progress she had noticed in self since admission. Maddy openly admitted that she was paranoid prior to admission and now feels better and more organized, has no fears of other people. She denied Suicidal ideation, Homicidal thoughts, Auditory hallucinations and Visual hallucinations. Li level from 3/9/2022 was 0.6.         Medications:       fluticasone  2 spray Both Nostrils Daily     gabapentin  300 mg Oral TID     levothyroxine  50 mcg Oral QAM AC     lithium ER  600 mg Oral BID     metFORMIN  1,000 mg Oral BID w/meals     risperiDONE  2 mg Oral At Bedtime     risperiDONE microspheres ER  25 mg Intramuscular Q14 Days          Allergies:     Allergies   Allergen Reactions     Dust Mites Shortness Of Breath     Animal Dander      Other reaction(s): *Unknown     Metformin Fatigue     Patient is taking at home as of 3/3/22     Mold      Other reaction(s): Runny Nose     Trees           Labs:     Recent Results (from the past 48 hour(s))   Glucose by meter    Collection Time: 03/08/22  5:43 PM   Result Value Ref Range    GLUCOSE BY METER POCT 174 (H) 70 - 99 mg/dL   Glucose by meter    Collection Time: 03/08/22  8:12 PM   Result Value Ref Range     "GLUCOSE BY METER POCT 190 (H) 70 - 99 mg/dL   Lithium level    Collection Time: 03/09/22  7:55 AM   Result Value Ref Range    Lithium 0.6   mmol/L   Glucose by meter    Collection Time: 03/09/22  8:27 AM   Result Value Ref Range    GLUCOSE BY METER POCT 144 (H) 70 - 99 mg/dL   Glucose by meter    Collection Time: 03/09/22 12:36 PM   Result Value Ref Range    GLUCOSE BY METER POCT 114 (H) 70 - 99 mg/dL   Glucose by meter    Collection Time: 03/09/22  5:47 PM   Result Value Ref Range    GLUCOSE BY METER POCT 150 (H) 70 - 99 mg/dL   Glucose by meter    Collection Time: 03/09/22  9:14 PM   Result Value Ref Range    GLUCOSE BY METER POCT 92 70 - 99 mg/dL   Glucose by meter    Collection Time: 03/09/22  9:31 PM   Result Value Ref Range    GLUCOSE BY METER POCT 119 (H) 70 - 99 mg/dL   Glucose by meter    Collection Time: 03/10/22  8:27 AM   Result Value Ref Range    GLUCOSE BY METER POCT 130 (H) 70 - 99 mg/dL   Glucose by meter    Collection Time: 03/10/22 12:22 PM   Result Value Ref Range    GLUCOSE BY METER POCT 114 (H) 70 - 99 mg/dL          Psychiatric Examination:     /83 (BP Location: Left arm)   Pulse 103   Temp 97.9  F (36.6  C) (Oral)   Resp 16   Ht 1.626 m (5' 4\")   Wt 114.1 kg (251 lb 8 oz)   SpO2 95%   BMI 43.17 kg/m    Weight is 251 lbs 8 oz  Body mass index is 43.17 kg/m .       Orthostatic Vitals  Report      Most Recent      Standing Orthostatic /70 03/10 0700    Standing Orthostatic Pulse (bpm) 108 03/10 0700         Appearance: somewhat somnolent, dressed in hospital scrubs and untidy  Attitude: more cooperative  Eye Contact:  poor, intermittent, often closing eyes when talking or looking away from writer  Mood:  anxious and depressed, improving  Affect: blunted, continues to be a bit labile  Speech:  clear, coherent and normal prosody,   Language: fluent and intact in English  Psychomotor, Gait, Musculoskeletal:  no evidence of tardive dyskinesia, dystonia, or tics  Thought Process:  " more tangential as interview progressed  Associations:  no loose associations  Thought Content:  no evidence of suicidal ideation or homicidal ideation and denies AVH, did not appear responding, some paranoia/delusional content remains  Insight:  partial, improving  Judgement:  limited, improving  Oriented to:  time, person, and place  Attention Span and Concentration:  limited, improving  Recent and Remote Memory:  appears intact to conversation  Fund of Knowledge:  appropriate    Clinical Global Impressions  First:  Considering your total clinical experience with this particular patient population, how severe are the patient's symptoms at this time?: 7 (03/04/22 1628)  Compared to the patient's condition at the START of treatment, this patient's condition is: 4 (03/04/22 1628)  Most recent:  Considering your total clinical experience with this particular patient population, how severe are the patient's symptoms at this time?: 7 (03/04/22 1628)  Compared to the patient's condition at the START of treatment, this patient's condition is: 4 (03/04/22 1628)           Precautions:     Behavioral Orders   Procedures     Code 1 - Restrict to Unit     Routine Programming     As clinically indicated     Status 15     Every 15 minutes.          Diagnoses:   Schizoaffective Disorder, bipolar type, with multiple episodes currently in acute episode with mixed mood state and active psychosis symptoms  Generalized Anxiety Disorder   Diabetes Mellitus, type 2  Hypothyroidism  Paranoid type Delusion disorder (H)  Asthma   Chronic leukocytosis          Assessment & Plan:   Assessment and hospital summary:  This patient is a 36 year old female with history of schizoaffective disorder, bipolar type, generalized anxiety disorder, and diabetes who presented to ED with disorganized thinking and abdominal pain in context of medication change from Vraylar to Risperdal. She does have mandeep-umbilical hernia. Her symptoms of disorganized  thinking, paranoia and delusions, along with depressive symptoms including feelings of worthlessness, helplessness, low mood, impaired concentration decreased energy and irrititability are most consistant with an exacerbation of her Schizoaffective Disorder, bipolar type, with multiple episodes currently in acute episode.      Patient denies knowing of any family history of mental illness. Urine Toxicology has not been collected but patient denies substance abuse. Her recent medication change coupled with her poor medication adherence caused hospitalization.    Treatment: was given first injection of Risperdal Consta 25 mg q2 weeks on 3/10/2022; will discuss tomorrow increasing Li dose. Will continue to provide support and structure. Will continue the rest of meds unchanged.      Disposition Plan   Reason for ongoing admission: is unable to care for self due to severe psychosis or sofía  Discharge location: TBD, likely home; CTC will talk to  tomorrow to evaluate Maddy's progress.  Discharge Medications: not ordered  Follow-up Appointments: not scheduled  Legal Status: voluntary     Trevon Motta MD  [facility]

## 2022-03-10 NOTE — CONSULTS
[facility]  Consult Note - Hospitalist Service  Date of Admission:  3/3/2022  Consult Requested by: Dr. Holly  Reason for Consult: Cough, abdominal pain    Assessment & Plan   Maddy Ortiz is a 36 year old female with a history of DM2, bipolar disorder, depression, and seasonal allergies who was admitted to inpatient behavioral health with suicidal ideation.     Cough  Viral syndrome vs allergic rhinitis  Patient was endorsing nasal congestion last week. COVID PCR, influenza A, influenza B all negative (she did actually have COVID in January, PCR positive on 1/8/22). Her nasal symptoms have improved slightly but now has a mild dry cough. Cough improved today. Unclear what her baseline allergy symptoms are - her cough and nasal congestion may simply be due to an allergic rhinitis flare vs other viral illness. She is not on any antihistamines.    - Albuterol PRN   - Flonase once daily   - Zyrtec PRN   - Notify Medicine for worsening cough, shortness of breath, hypoxia, fevers, or other concerning pulmonary symptoms    Abdominal pain, resolved  Periumbilical hernia  CT on admission demonstrated a small stable fat-containing periumbilical hernia. I am unable to palpate the hernia on exam. No evidence of a strangulated hernia at this time. Unclear etiology of her abdominal pain but it is reassuring that it has since resolved.    - Continue to monitor   - Notify Medicine for worsening abdominal pain       Medicine will sign off. Please do not hesitate to contact if new questions or concerns arise.       NANCY Lagunas [facility]  Securely message with the Vocera Web Console (learn more here)  Text page via [facility] Paging/Directory       Hospitalist Service    Clinically Significant Risk Factors Present on Admission                    ______________________________________________________________________    Chief  "Complaint   \"I'm tired\"    History is obtained from the patient    History of Present Illness   Maddy Ortiz is a 36 year old female with a history of DM2, bipolar disorder, depression, and seasonal allergies who was admitted to inpatient behavioral health with suicidal ideation.     I was contacted by nursing staff yesterday evening that the patient had a cough.  Cough started sometime within the last 24 hours.  Per chart review, it appears that the patient was evaluated by internal medicine on 3-4-22 for nasal congestion and body aches.  At that time the patient did not have a cough or a fever.  Her Covid PCR, influenza A, and influenza B were all negative. A respiratory viral panel at the time was positive for coronavirus, this was not COVID-19. It was felt that a different viral syndrome was causing the patient's symptoms. Of note, the patient did have a positive Covid PCR on 1-8-22 and fully recovered from that illness.  Patient remains afebrile with no other signs or symptoms of acute illness.    Today she feels that her cough is already improved. Her cough is nonproductive.  No shortness of breath. She still has some mild nasal congestion. She tells me she has \"bad allergies\" but she is unable to elaborate on her baseline allergic rhinitis symptoms.     The patient has also reported some abdominal pain to nursing staff recently.  Today the patient denies abdominal pain.  She does endorse a history of an umbilical hernia and has never had surgery for this.     She has no other medical concerns today.     Review of Systems   The 10 point Review of Systems is negative other than noted in the HPI or here.     Past Medical History    I have reviewed this patient's medical history and updated it with pertinent information if needed.   Past Medical History:   Diagnosis Date     Bipolar 1 disorder (H) 12/6/2012     Depressive disorder      Diabetes mellitus, type 2 (H) 12/13/2021     Paranoid type delusional " disorder (H) 11/14/2012       Past Surgical History   I have reviewed this patient's surgical history and updated it with pertinent information if needed.  Past Surgical History:   Procedure Laterality Date     TONSILLECTOMY & ADENOIDECTOMY      as a child     TONSILLECTOMY & ADENOIDECTOMY         Social History   I have reviewed this patient's social history and updated it with pertinent information if needed.  Social History     Tobacco Use     Smoking status: Never Smoker     Smokeless tobacco: Never Used     Tobacco comment: around 2nd hand smoke   Vaping Use     Vaping Use: Never used   Substance Use Topics     Alcohol use: Not Currently     Drug use: Not Currently       Family History   I have reviewed this patient's family history and updated it with pertinent information if needed.  Family History   Adopted: Yes   Problem Relation Age of Onset     Unknown/Adopted Mother      Unknown/Adopted Father      Unknown/Adopted Other      Hypertension Sister        Medications   Medications Prior to Admission   Medication Sig Dispense Refill Last Dose     albuterol (PROAIR HFA/PROVENTIL HFA/VENTOLIN HFA) 108 (90 Base) MCG/ACT inhaler Inhale 2 puffs into the lungs every 4 hours as needed for wheezing or shortness of breath / dyspnea 18 g 3      ammonium lactate (LAC-HYDRIN) 12 % external cream Apply topically 2 times daily as needed for dry skin 385 g 3      benztropine (COGENTIN) 0.5 MG tablet Take 1 tablet (0.5 mg) by mouth daily as needed 30 tablet 1      EPINEPHrine (ANY BX GENERIC EQUIV) 0.3 MG/0.3ML injection 2-pack Inject 0.3 mg into the muscle as needed for anaphylaxis        gabapentin (NEURONTIN) 300 MG capsule Take 1 capsule (300 mg) by mouth 3 times daily 90 capsule 4 3/2/2022 at Unknown time     hydrOXYzine (ATARAX) 25 MG tablet Take 1 tablet by mouth nightly as needed        levothyroxine (SYNTHROID/LEVOTHROID) 25 MCG tablet Take 1 tablet (25 mcg) by mouth every morning (before breakfast) 30 tablet 1 Past  Week at am     lithium ER (LITHOBID) 300 MG CR tablet Take 4 tablets (1,200 mg) by mouth At Bedtime (Patient taking differently: Take 600 mg by mouth 2 times daily ) 120 tablet 1 3/2/2022 at am     Melatonin 10 MG TABS tablet Take 10 mg by mouth nightly as needed for sleep         metFORMIN (GLUCOPHAGE-XR) 500 MG 24 hr tablet Take 2 tablets (1,000 mg) by mouth 2 times daily (with meals) 120 tablet 1 3/2/2022 at Unknown time     mineral oil-hydrophilic petrolatum (AQUAPHOR) external ointment Apply topically daily as needed for dry skin (apply to dry skin and rash around umbilical hernia)        risperiDONE (RISPERDAL) 0.5 MG tablet Take 0.5 mg by mouth 2 times daily    3/2/2022 at am     terbinafine (LAMISIL AT) 1 % external cream Apply topically 2 times daily 12 g 1 Past Month at Unknown time       Allergies   Allergies   Allergen Reactions     Dust Mites Shortness Of Breath     Animal Dander      Other reaction(s): *Unknown     Metformin Fatigue     Patient is taking at home as of 3/3/22     Mold      Other reaction(s): Runny Nose     Trees        Physical Exam   Vital Signs: Temp: 97.9  F (36.6  C) Temp src: Oral BP: 123/83 Pulse: 103   Resp: 16 SpO2: 95 % O2 Device: None (Room air)    Weight: 251 lbs 8 oz    Constitutional: Awake and alert, in no apparent distress. Laying comfortably in bed.   Eyes: Sclera clear, anicteric   Respiratory: Breathing comfortably on room air. Clear to auscultation bilaterally with no crackles, wheezing, or rhonchi. Good air entry throughout.   Cardiovascular:  RRR, normal S1/S2. No rubs or murmurs. No peripheral edema.   GI: Soft, non-tender, non-distended. Normoactive bowel sounds.   Skin:  Good color. No jaundice. No visible rashes, lesions, or bruising of concern.   Neurologic: Alert and fully oriented. No focal deficits.       Data   ROUTINE IP LABS (Last four results)  Recent Labs   Lab 03/10/22  0827 03/09/22  2131 03/09/22  2114 03/09/22  1747 03/04/22  0851 03/04/22  0731    * 119* 92 150*   < >  --    PROTTOTAL  --   --   --   --   --  7.2   ALBUMIN  --   --   --   --   --  3.4   BILITOTAL  --   --   --   --   --  0.5   ALKPHOS  --   --   --   --   --  96   AST  --   --   --   --   --  120*   ALT  --   --   --   --   --  107*    < > = values in this interval not displayed.     Recent Labs   Lab 03/05/22  0623 03/04/22  0731   WBC 11.5* 12.9*   RBC 4.15 4.27   HGB 12.3 12.9   HCT 37.4 38.5   MCV 90 90   MCH 29.6 30.2   MCHC 32.9 33.5   RDW 12.6 12.7    249     No lab results found in last 7 days.     Glucose Values Latest Ref Rng & Units 3/3/2022   Bedside Glucose (mg/dl )  - --   GLUCOSE 70 - 99 mg/dL 200(H)   Some recent data might be hidden

## 2022-03-10 NOTE — PROGRESS NOTES
03/09/22 2200   Group Therapy Session   Group Attendance attended group session   Time Session Began 2000   Time Session Ended 2100   Total Time patient participated (minutes) 60   Total # Attendees 3   Group Type psychotherapeutic   Group Topic Covered cognitive therapy techniques   Group Session Detail   (process discussion)   Patient Response/Contribution confused;discussed personal experience with topic;listened actively   Pt was less perseverative tonight, there is still some of that but she responds very well to mindfulness techniques. Pt reported mood as awake and happy .

## 2022-03-10 NOTE — PROGRESS NOTES
Pt appears to be sleeping the whole night. No prn needed. Pt on status 15 min check. Will continue to monitor.

## 2022-03-11 LAB
GLUCOSE BLDC GLUCOMTR-MCNC: 102 MG/DL (ref 70–99)
GLUCOSE BLDC GLUCOMTR-MCNC: 160 MG/DL (ref 70–99)
GLUCOSE BLDC GLUCOMTR-MCNC: 181 MG/DL (ref 70–99)
GLUCOSE BLDC GLUCOMTR-MCNC: 89 MG/DL (ref 70–99)
SARS-COV-2 RNA RESP QL NAA+PROBE: NEGATIVE

## 2022-03-11 PROCEDURE — U0003 INFECTIOUS AGENT DETECTION BY NUCLEIC ACID (DNA OR RNA); SEVERE ACUTE RESPIRATORY SYNDROME CORONAVIRUS 2 (SARS-COV-2) (CORONAVIRUS DISEASE [COVID-19]), AMPLIFIED PROBE TECHNIQUE, MAKING USE OF HIGH THROUGHPUT TECHNOLOGIES AS DESCRIBED BY CMS-2020-01-R: HCPCS | Performed by: PSYCHIATRY & NEUROLOGY

## 2022-03-11 PROCEDURE — 250N000013 HC RX MED GY IP 250 OP 250 PS 637: Performed by: PHYSICIAN ASSISTANT

## 2022-03-11 PROCEDURE — 250N000013 HC RX MED GY IP 250 OP 250 PS 637: Performed by: PSYCHIATRY & NEUROLOGY

## 2022-03-11 PROCEDURE — 99232 SBSQ HOSP IP/OBS MODERATE 35: CPT | Performed by: PSYCHIATRY & NEUROLOGY

## 2022-03-11 PROCEDURE — 250N000013 HC RX MED GY IP 250 OP 250 PS 637: Performed by: NURSE PRACTITIONER

## 2022-03-11 PROCEDURE — 124N000002 HC R&B MH UMMC

## 2022-03-11 RX ORDER — CETIRIZINE HYDROCHLORIDE 10 MG/1
10 TABLET ORAL
Status: DISCONTINUED | OUTPATIENT
Start: 2022-03-11 | End: 2022-03-30 | Stop reason: HOSPADM

## 2022-03-11 RX ADMIN — CETIRIZINE HYDROCHLORIDE 10 MG: 10 TABLET, FILM COATED ORAL at 08:10

## 2022-03-11 RX ADMIN — ACETAMINOPHEN 650 MG: 325 TABLET, FILM COATED ORAL at 18:16

## 2022-03-11 RX ADMIN — GABAPENTIN 300 MG: 300 CAPSULE ORAL at 13:11

## 2022-03-11 RX ADMIN — METFORMIN HYDROCHLORIDE 1000 MG: 500 TABLET, EXTENDED RELEASE ORAL at 17:39

## 2022-03-11 RX ADMIN — LITHIUM CARBONATE 600 MG: 300 TABLET, EXTENDED RELEASE ORAL at 08:10

## 2022-03-11 RX ADMIN — GABAPENTIN 300 MG: 300 CAPSULE ORAL at 08:10

## 2022-03-11 RX ADMIN — METFORMIN HYDROCHLORIDE 1000 MG: 500 TABLET, EXTENDED RELEASE ORAL at 08:10

## 2022-03-11 RX ADMIN — GABAPENTIN 300 MG: 300 CAPSULE ORAL at 20:28

## 2022-03-11 RX ADMIN — LEVOTHYROXINE SODIUM 50 MCG: 25 TABLET ORAL at 08:10

## 2022-03-11 RX ADMIN — FLUTICASONE PROPIONATE 2 SPRAY: 50 SPRAY, METERED NASAL at 08:11

## 2022-03-11 RX ADMIN — RISPERIDONE 2 MG: 2 TABLET ORAL at 20:28

## 2022-03-11 RX ADMIN — ALBUTEROL SULFATE 2 PUFF: 90 AEROSOL, METERED RESPIRATORY (INHALATION) at 08:10

## 2022-03-11 RX ADMIN — LITHIUM CARBONATE 600 MG: 300 TABLET, EXTENDED RELEASE ORAL at 20:28

## 2022-03-11 ASSESSMENT — ACTIVITIES OF DAILY LIVING (ADL)
ORAL_HYGIENE: INDEPENDENT
DRESS: SCRUBS (BEHAVIORAL HEALTH)
HYGIENE/GROOMING: INDEPENDENT
HYGIENE/GROOMING: INDEPENDENT
LAUNDRY: WITH SUPERVISION
ORAL_HYGIENE: INDEPENDENT
LAUNDRY: WITH SUPERVISION
DRESS: SCRUBS (BEHAVIORAL HEALTH)

## 2022-03-11 NOTE — PLAN OF CARE
Problem: Thought Process Alteration  Goal: Optimal Thought Clarity  Outcome: Ongoing, Progressing      Behavioral  Pt slept 6.25 hours overnight; Pt oriented x 4; Pt pleasant and cooperative upon approach;  pt compliant with medications and cares;  Pt rates appetite as good; eating and hydrating adequately; Pt social with selects staff and peers; visible in the milieu; attended groups; speech fluent and appropriate in context; pt slightly disorganized and tangential at times; Pt denied SI, SIB, HI, and hallucinations; Pt presents with flat and blunted affect mood;     Medical  Pt AM  (pt stated she ate 2 oranges overnight); Pt noon BG 89    Plan  Discharge home with outpatient services; date TBD

## 2022-03-11 NOTE — PLAN OF CARE
"Goal Outcome Evaluation:    Plan of Care Reviewed With: patient      The patient is pleasant upon approach and cooperative with cares. Said her mood is \"okay\" and c/o of boredom. The patient said she likes to read and agreed to try and find a book to start reading over the weekend. She also said she likes art and the patient was told to ask for markers if she is interested in using them for artwork. The patient was agreeable with goal to attend the evening group. Reported back pain '5'/10- prn tylenol given, denied anxiety and depression and said no SI or hallucinations and is agreeable to tell staff if she has any thoughts of suicide or self-harm. Compliant with covid test which was sent to lab. Ate 50% of meal in dinning room. Used the phone and has been napping. Was compliant with scheduled medications.     Blood sugars this shift: 160 & 102  Covid test resulted negative         "

## 2022-03-11 NOTE — PLAN OF CARE
"Appointments:   Canceled upcoming appointments next week and rescduled see below.    Tasks Complete:    CTC spoke with pt about discharge planning and care coordination. Pt thought process appeared disorganized and increased speech. In addition, she reports she has a Ph.D. in business management and is interested in going back to school to study human sexuality. When asked about her , she reports that she does not have a  and has a \" friend\" who she \"sees that is much older \" and is not her child's father. She reports that he is having a midlife crisis and that she likely will not plan her future with him. When asked about goals and things she would like to work on, the patient responded, \" I think a lot of my thoughts and goals come across as manic, so I will write those thoughts in a journal and tell my therapist\". Pt expressed that her daughter is 14 years old, but CTC could call her father to discuss her history and baseline symptoms. Pt provided conflicting information regarding her natural support. PT noted that both her friend/boyfriend's name is Jay Jay. CTC asked for clarification, and the pt endured a long pause and stated restated that both her friend and dad have the same name    CTC left a voicemail for Jay Jay Ortiz- pt's dad   CTC left a voicemail for [employer] for additional collateral and confirmation on if what the pt is reporting is reality based.    CTC canceled  and rescheduled appointments next week  -03/28 @10:45am -Weight Mangament Surg- Dr. Fidelia Nunez via video @ [facility] and [place], [place]  -5/06/22 @10:30 am  in person- Allergy Appointment- Sommer Shin MD       Current Symptoms include the following: PT engages in delusional thinking, disorganized and grandiose thoughts, and increased speech     Addressed patient needs/concerns: PT did not have any concerns/ needs at this time    Discharge Plan or Goal   Plan  Likely discharge home with outpatient " services     Care Team   Care Team    COLLEEN Roche CM- [facility]ina[facility]- [phone]   Therapist- Callie Manley LP-[phone]  Psychiatrist-Shirley Gooden,NP- [facility]-   Financial Worker- Elvira Bae- [facility]- [phone]  Outpatient DBT Group through [facility])     Barriers to Discharge   Ongoing stabilization     Referral Status  No referrals needed at this time     Legal Status  Voluntary

## 2022-03-11 NOTE — PLAN OF CARE
Goal Outcome Evaluation:      The patient reported her mood is okay with moments of sadness. Reported she does not have any intrusive or racing thoughts although admits she has fixated thoughts that can be troubling. Said she is trying to avoid catastrophic thinking and reported this is helpful for her. Added that she is feeling hopeful about education and therapy and plans to take a class at the [employer] after discharge. Also said she is trying to take her medications consistently. The patient SO visited this evening. The patient voice was raised and was agitated and argumentative with partner. The patient was asked by staff to lower her voice or end the visit. The patient was able to finish the visit and by the end of it the mood was calmer and they were no longer arguing. She attended the music therapy group. Was compliant with scheduled medications.    Said she has a bill due and would like 10 minutes on the computer tomorrow. Note left for the Provider.     Blood sugar this shift: 138, 179

## 2022-03-11 NOTE — PROGRESS NOTES
"[facility], [place]   Psychiatric Progress Note        Interim History:   The patient's care was discussed with the treatment team during the daily team meeting and/or staff's chart notes were reviewed. Staff report patient continues to be cooperative and pleasant. She has been more more visible in the milieu. [facility] spoke to patient about discharge planning and care coordination and patient was noted to be disorganized. When asked about her , patient reported her  is actually just a friend. Patient otherwise was noted to sleep approximately 6.25 hours. She has maintained an appropriate appetite.      Upon interview, pt was seen with PA student. Maddy was in the milieu sleeping in a chair. Patient stated that \"she's been sleeping all day and thinks it's the Zyrtec.\" Patient states she's doing \"OK\" and then became disorganized describing her disagreement with a visitor last night. Maddy stated, \"my friend came to visit me and I was upset because he was trying to cause my ideas to not work instead of letting them work on their own.\" She went on to say, \"I assumed he was doing this because we wanted to... he mentioned my neighbors and compared me to them... he felt like he wanted to force me in a situation where I like him.\" Maddy also voiced her friend is jealous of her and her family. She states he wants her to go to [employer], which she associates with family conflict. She states she'd \"never say publicly that Jall[place] is associated with conflict but I do think privately.\"  She otherwise states she feels better after her medications. She denies SI, HI, auditory hallucinations and visual hallucinations. No other concerns or questions at this time.          Medications:       fluticasone  2 spray Both Nostrils Daily     gabapentin  300 mg Oral TID     levothyroxine  50 mcg Oral QAM AC     lithium ER  600 mg Oral BID     metFORMIN  1,000 mg Oral BID w/meals     risperiDONE  2 mg " "Oral At Bedtime     risperiDONE microspheres ER  25 mg Intramuscular Q14 Days          Allergies:     Allergies   Allergen Reactions     Dust Mites Shortness Of Breath     Animal Dander      Other reaction(s): *Unknown     Metformin Fatigue     Patient is taking at home as of 3/3/22     Mold      Other reaction(s): Runny Nose     Trees           Labs:     Recent Results (from the past 24 hour(s))   Glucose by meter    Collection Time: 03/10/22  5:43 PM   Result Value Ref Range    GLUCOSE BY METER POCT 179 (H) 70 - 99 mg/dL   Glucose by meter    Collection Time: 03/10/22  9:01 PM   Result Value Ref Range    GLUCOSE BY METER POCT 138 (H) 70 - 99 mg/dL   Glucose by meter    Collection Time: 03/11/22  7:57 AM   Result Value Ref Range    GLUCOSE BY METER POCT 181 (H) 70 - 99 mg/dL   Glucose by meter    Collection Time: 03/11/22 11:39 AM   Result Value Ref Range    GLUCOSE BY METER POCT 89 70 - 99 mg/dL          Psychiatric Examination:     /80 (BP Location: Left arm, Patient Position: Sitting, Cuff Size: Adult Large)   Pulse 109   Temp 97.9  F (36.6  C) (Oral)   Resp 15   Ht 1.626 m (5' 4\")   Wt 114.1 kg (251 lb 8 oz)   SpO2 96%   BMI 43.17 kg/m    Weight is 251 lbs 8 oz  Body mass index is 43.17 kg/m .    Orthostatic Vitals  Report      Most Recent      Sitting Orthostatic /80 03/11 0700    Sitting Orthostatic Pulse (bpm) 109 03/11 0700    Standing Orthostatic /84 03/11 0700    Standing Orthostatic Pulse (bpm) 108 03/10 0700          Appearance: somewhat somnolent, dressed in hospital scrubs and untidy  Attitude: more cooperative  Eye Contact:  poor, intermittent, often closing eyes when talking or looking away  Mood:  anxious and depressed, improving  Affect: blunted, continues to be a bit labile  Speech:  clear, coherent and normal prosody,   Language: fluent and intact in English  Psychomotor, Gait, Musculoskeletal:  no evidence of tardive dyskinesia, dystonia, or tics  Thought Process:  " disorganized  Associations:  no loose associations  Thought Content:  no evidence of suicidal ideation or homicidal ideation and denies AVH, did not appear responding, some paranoia/delusional content remains  Insight:  limited  Judgement:  limited  Oriented to:  time, person, and place  Attention Span and Concentration: limited  Recent and Remote Memory:  appears intact to conversation  Fund of Knowledge:  limited    Clinical Global Impressions  First:  Considering your total clinical experience with this particular patient population, how severe are the patient's symptoms at this time?: 7 (03/04/22 1628)  Compared to the patient's condition at the START of treatment, this patient's condition is: 4 (03/04/22 1628)  Most recent:  Considering your total clinical experience with this particular patient population, how severe are the patient's symptoms at this time?: 7 (03/04/22 1628)  Compared to the patient's condition at the START of treatment, this patient's condition is: 4 (03/04/22 1628)         Precautions:     Behavioral Orders   Procedures     Code 1 - Restrict to Unit     Routine Programming     As clinically indicated     Status 15     Every 15 minutes.          Diagnoses:     Schizoaffective Disorder, bipolar type, with multiple episodes currently in acute episode with mixed mood state and active psychosis symptoms  Generalized Anxiety Disorder   Diabetes Mellitus, type 2  Hypothyroidism  Paranoid type Delusion disorder (H)  Asthma   Chronic leukocytosis          Plan:     1. Continue lithium  mg BID. Will consider increasing dosage on Monday. Continue risperidone 2 mg at bedtime.   2. Start Zyrtec 10 mg at bedtime vs morning due to reported lethargy.   3. Will continue to provide support and structure.         I was present with Amanda Gomez, PA student, who participated in the service and in the documentation of the note. I have verified the history and personally performed the physical  exam and medical decision making. I agree with the assessment and plan of care as documented in the note.     Trevon Motta MD  [facility] Psychiatry

## 2022-03-11 NOTE — PROGRESS NOTES
CLINICAL NUTRITION SERVICES - REASSESSMENT NOTE     Nutrition Prescription    RECOMMENDATIONS FOR MDs/PROVIDERS TO ORDER:  Please consult RDN if any nutrition needs arise.    Malnutrition Status:    Patient does not meet two of the established criteria necessary for diagnosing malnutrition.    Future/Additional Recommendations:  RDN to sign off. Will follow up if consulted.     EVALUATION OF THE PROGRESS TOWARD GOALS   Diet: Moderate Consistent Carbohydrate (60g CHO / meal)  Intake: Per patient, ~100% of meals     NEW FINDINGS   Patient states she doesn't remember her last RDN visit. She is trying better control her blood sugars through diet modification (ex: her BG was low this AM so she ate extra fruit and then at lunch she skipped eating the chips at lunch). She does not have any nutrition concerns at this time.    Weight:  Weight gain of 1.3% since admission.  Wt Readings from Last 3 Encounters:   03/10/22 114.1 kg (251 lb 8 oz)   03/03/22 112.6 kg (248lb 3.2oz)     Labs:  Reviewed 3/11   Glucose ranging between 114H-181H (3/10-3/11)    Medications:  Reviewed 3/11    MALNUTRITION  % Intake: No decreased intake noted  % Weight Loss: None noted  Subcutaneous Fat Loss: None observed (per last RDN [facility] 3/4)  Muscle Loss: None observed (per last RDN [facility] 3/4)  Fluid Accumulation/Edema: None noted (per last RDN [facility] 3/4)  Malnutrition Diagnosis: Patient does not meet two of the established criteria necessary for diagnosing malnutrition    Previous Goals   1. Patient to limit CHO to 45-60 gm / meal, snacks <= 15 gm CHO per meal  Evaluation: Met for the most part with fluctuations.  2. Pt to maintain weight @ 112 kg, with weight loss desired  Evaluation: Not met, gained weight    Previous Nutrition Diagnosis  Excessive energy intake related to high sugar food choices as evidenced by -200 and BMI > 40 kg/m2    Evaluation: Improving    CURRENT NUTRITION DIAGNOSIS  None at this time. RDN to sign off.      INTERVENTIONS  Implementation  Collaboration with other providers -- consult RDN if further nutrition needs arise.    Goals  1. Patient to limit CHO to 45-60 gm / meal, snacks <= 15 gm CHO per meal  2. Pt to maintain weight @ 112 kg, with weight loss desired and to not gain weight >114.1kg (highest weight this admission).  3. Continue following up with outpatient RDN.    Monitoring/Evaluation  Progress toward goals will be monitored and evaluated per protocol.    Sary Wheat, MPH, RDN, LD  Behavioral Clinical Dietitian  Mental Health Pager (M-F): [phone]  On Call Pager (weekends only): [phone]

## 2022-03-12 LAB
GLUCOSE BLDC GLUCOMTR-MCNC: 107 MG/DL (ref 70–99)
GLUCOSE BLDC GLUCOMTR-MCNC: 124 MG/DL (ref 70–99)
GLUCOSE BLDC GLUCOMTR-MCNC: 128 MG/DL (ref 70–99)
GLUCOSE BLDC GLUCOMTR-MCNC: 128 MG/DL (ref 70–99)

## 2022-03-12 PROCEDURE — 250N000013 HC RX MED GY IP 250 OP 250 PS 637: Performed by: PSYCHIATRY & NEUROLOGY

## 2022-03-12 PROCEDURE — 124N000002 HC R&B MH UMMC

## 2022-03-12 PROCEDURE — 250N000013 HC RX MED GY IP 250 OP 250 PS 637: Performed by: NURSE PRACTITIONER

## 2022-03-12 PROCEDURE — 250N000013 HC RX MED GY IP 250 OP 250 PS 637: Performed by: PHYSICIAN ASSISTANT

## 2022-03-12 RX ADMIN — RISPERIDONE 2 MG: 2 TABLET ORAL at 20:57

## 2022-03-12 RX ADMIN — Medication 10 MG: at 21:05

## 2022-03-12 RX ADMIN — LITHIUM CARBONATE 600 MG: 300 TABLET, EXTENDED RELEASE ORAL at 08:04

## 2022-03-12 RX ADMIN — LITHIUM CARBONATE 600 MG: 300 TABLET, EXTENDED RELEASE ORAL at 20:56

## 2022-03-12 RX ADMIN — GABAPENTIN 300 MG: 300 CAPSULE ORAL at 15:01

## 2022-03-12 RX ADMIN — GABAPENTIN 300 MG: 300 CAPSULE ORAL at 20:56

## 2022-03-12 RX ADMIN — METFORMIN HYDROCHLORIDE 1000 MG: 500 TABLET, EXTENDED RELEASE ORAL at 17:39

## 2022-03-12 RX ADMIN — METFORMIN HYDROCHLORIDE 1000 MG: 500 TABLET, EXTENDED RELEASE ORAL at 08:04

## 2022-03-12 RX ADMIN — GABAPENTIN 300 MG: 300 CAPSULE ORAL at 08:04

## 2022-03-12 RX ADMIN — FLUTICASONE PROPIONATE 2 SPRAY: 50 SPRAY, METERED NASAL at 08:04

## 2022-03-12 RX ADMIN — ACETAMINOPHEN 650 MG: 325 TABLET, FILM COATED ORAL at 17:08

## 2022-03-12 RX ADMIN — LEVOTHYROXINE SODIUM 50 MCG: 25 TABLET ORAL at 08:04

## 2022-03-12 RX ADMIN — ACETAMINOPHEN 650 MG: 325 TABLET, FILM COATED ORAL at 11:36

## 2022-03-12 ASSESSMENT — ACTIVITIES OF DAILY LIVING (ADL)
DRESS: SCRUBS (BEHAVIORAL HEALTH)
LAUNDRY: UNABLE TO COMPLETE
ORAL_HYGIENE: INDEPENDENT
HYGIENE/GROOMING: INDEPENDENT
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
## 2022-03-13 LAB
GLUCOSE BLDC GLUCOMTR-MCNC: 120 MG/DL (ref 70–99)
GLUCOSE BLDC GLUCOMTR-MCNC: 122 MG/DL (ref 70–99)
GLUCOSE BLDC GLUCOMTR-MCNC: 132 MG/DL (ref 70–99)
GLUCOSE BLDC GLUCOMTR-MCNC: 149 MG/DL (ref 70–99)

## 2022-03-13 PROCEDURE — 250N000013 HC RX MED GY IP 250 OP 250 PS 637: Performed by: NURSE PRACTITIONER

## 2022-03-13 PROCEDURE — 124N000002 HC R&B MH UMMC

## 2022-03-13 PROCEDURE — 250N000013 HC RX MED GY IP 250 OP 250 PS 637: Performed by: PHYSICIAN ASSISTANT

## 2022-03-13 PROCEDURE — 250N000013 HC RX MED GY IP 250 OP 250 PS 637: Performed by: PSYCHIATRY & NEUROLOGY

## 2022-03-13 RX ADMIN — GABAPENTIN 300 MG: 300 CAPSULE ORAL at 08:51

## 2022-03-13 RX ADMIN — GABAPENTIN 300 MG: 300 CAPSULE ORAL at 13:50

## 2022-03-13 RX ADMIN — GABAPENTIN 300 MG: 300 CAPSULE ORAL at 20:20

## 2022-03-13 RX ADMIN — LEVOTHYROXINE SODIUM 50 MCG: 25 TABLET ORAL at 08:51

## 2022-03-13 RX ADMIN — FLUTICASONE PROPIONATE 2 SPRAY: 50 SPRAY, METERED NASAL at 08:52

## 2022-03-13 RX ADMIN — LITHIUM CARBONATE 600 MG: 300 TABLET, EXTENDED RELEASE ORAL at 20:21

## 2022-03-13 RX ADMIN — ACETAMINOPHEN 650 MG: 325 TABLET, FILM COATED ORAL at 17:26

## 2022-03-13 RX ADMIN — RISPERIDONE 2 MG: 2 TABLET ORAL at 20:21

## 2022-03-13 RX ADMIN — METFORMIN HYDROCHLORIDE 1000 MG: 500 TABLET, EXTENDED RELEASE ORAL at 08:51

## 2022-03-13 RX ADMIN — LITHIUM CARBONATE 600 MG: 300 TABLET, EXTENDED RELEASE ORAL at 08:51

## 2022-03-13 RX ADMIN — METFORMIN HYDROCHLORIDE 1000 MG: 500 TABLET, EXTENDED RELEASE ORAL at 17:48

## 2022-03-13 ASSESSMENT — ACTIVITIES OF DAILY LIVING (ADL)
HYGIENE/GROOMING: INDEPENDENT
ORAL_HYGIENE: INDEPENDENT
DRESS: INDEPENDENT
LAUNDRY: UNABLE TO COMPLETE

## 2022-03-13 NOTE — PLAN OF CARE
Goal Outcome Evaluation:      Pt. willing to engage in a 1:1 with staff.  Pt. states that she is doing better overall.  The pt. states that she still has some times when she feels anxious and depressed but overall feels she is improving.  The pt. states that she is very tired, that she wants to rest a lot during the day.  The pt. Does appear lethargic  overall and slightly slow to respond to questions from this staff.  The pt. Reports that she lives at home with her 14 year old daughter, that it is going fairly well.  There is some financial stress but she and her daughter are managing overall.  The pt. Does not feel ready for discharge at this time.  Currently the pt. Denies suicidal ideation, self-injurious behavior,  auditory or visual hallucinations. The pt. States that her level of depression is much better.  Overall the pt. seems to be exhibiting some improvement.   The pt. Has no questions or requests at this time.

## 2022-03-13 NOTE — PLAN OF CARE
"Goal Outcome Evaluation:      The patient has been visible in the milieu, not social with peers. The patient reports she is feeling sad about a specific situation and would not give details about it. Said she has been trying to relax by \"grounding herself\"  and \"being in the present moment\" and  trying to think encouraging thoughts instead of negative ones. She also added that she was not sure if she was anxious or not. Denied SI and said no hallucinations and is agreed to tell staff if she starts to have any thoughts of self harm. Spent time listening to music and used the exercise bike. Had c/o of back pain '4'/10, prn tylenol given.The patient made eye contact briefly during check-in otherwise had her head turned looking away from writer. The patient ate 100% of dinner. Noted to have attended group and left early due to the discussion questions being too hard.     Prn's given this shift: Tylenol & Melatonin    Blood sugars this shift: 128 & 124           "

## 2022-03-13 NOTE — PROGRESS NOTES
Pt appears to be sleeping the whole night. No prn given. Pt on status 15 min check. Will continue to monitor.

## 2022-03-13 NOTE — PROGRESS NOTES
"   03/12/22 2100   Group Therapy Session   Group Attendance attended group session   Total Time patient participated (minutes) 10   Total # Attendees 3   Group Type recreation   Group Topic Covered relaxation techniques;coping skills/lifestyle management    Pt briefly attended the Therapeutic Recreation group this evening. Pt stayed for approximately x10 and followed the guided exercise routine. Pt left group during the discussion questions, stating they were \"too hard.\"  "

## 2022-03-14 LAB
GLUCOSE BLDC GLUCOMTR-MCNC: 116 MG/DL (ref 70–99)
GLUCOSE BLDC GLUCOMTR-MCNC: 168 MG/DL (ref 70–99)

## 2022-03-14 PROCEDURE — 250N000013 HC RX MED GY IP 250 OP 250 PS 637: Performed by: PHYSICIAN ASSISTANT

## 2022-03-14 PROCEDURE — 250N000013 HC RX MED GY IP 250 OP 250 PS 637: Performed by: PSYCHIATRY & NEUROLOGY

## 2022-03-14 PROCEDURE — 250N000013 HC RX MED GY IP 250 OP 250 PS 637: Performed by: NURSE PRACTITIONER

## 2022-03-14 PROCEDURE — 124N000002 HC R&B MH UMMC

## 2022-03-14 PROCEDURE — G0177 OPPS/PHP; TRAIN & EDUC SERV: HCPCS

## 2022-03-14 PROCEDURE — 99232 SBSQ HOSP IP/OBS MODERATE 35: CPT | Performed by: PSYCHIATRY & NEUROLOGY

## 2022-03-14 RX ORDER — LITHIUM CARBONATE 300 MG/1
600 TABLET, FILM COATED, EXTENDED RELEASE ORAL EVERY MORNING
Status: DISCONTINUED | OUTPATIENT
Start: 2022-03-15 | End: 2022-03-30 | Stop reason: HOSPADM

## 2022-03-14 RX ADMIN — LITHIUM CARBONATE 750 MG: 300 TABLET, EXTENDED RELEASE ORAL at 21:00

## 2022-03-14 RX ADMIN — HYDROXYZINE HYDROCHLORIDE 25 MG: 25 TABLET, FILM COATED ORAL at 04:40

## 2022-03-14 RX ADMIN — METFORMIN HYDROCHLORIDE 1000 MG: 500 TABLET, EXTENDED RELEASE ORAL at 08:59

## 2022-03-14 RX ADMIN — FLUTICASONE PROPIONATE 2 SPRAY: 50 SPRAY, METERED NASAL at 08:59

## 2022-03-14 RX ADMIN — RISPERIDONE 2 MG: 2 TABLET ORAL at 21:00

## 2022-03-14 RX ADMIN — GABAPENTIN 300 MG: 300 CAPSULE ORAL at 08:59

## 2022-03-14 RX ADMIN — GABAPENTIN 300 MG: 300 CAPSULE ORAL at 14:04

## 2022-03-14 RX ADMIN — LEVOTHYROXINE SODIUM 50 MCG: 25 TABLET ORAL at 08:59

## 2022-03-14 RX ADMIN — GABAPENTIN 300 MG: 300 CAPSULE ORAL at 21:00

## 2022-03-14 RX ADMIN — LITHIUM CARBONATE 600 MG: 300 TABLET, EXTENDED RELEASE ORAL at 08:59

## 2022-03-14 RX ADMIN — METFORMIN HYDROCHLORIDE 1000 MG: 500 TABLET, EXTENDED RELEASE ORAL at 18:50

## 2022-03-14 ASSESSMENT — ACTIVITIES OF DAILY LIVING (ADL)
LAUNDRY: UNABLE TO COMPLETE
HYGIENE/GROOMING: INDEPENDENT
HYGIENE/GROOMING: INDEPENDENT
ORAL_HYGIENE: INDEPENDENT
ORAL_HYGIENE: INDEPENDENT
DRESS: INDEPENDENT
DRESS: INDEPENDENT

## 2022-03-14 NOTE — PLAN OF CARE
"Goal Outcome Evaluation:    Plan of Care Reviewed With: patient     Maddy was up ad mauricio, alternated being in her room and out in the milieu. Maddy denied having suicidal ideation or self harm thoughts. Pt continues with disorganized thinking and impaired insight ie \"I want to talk to the doctor about my medications because I am thinking about getting pregnant, I just need something more positive to plan about\".     Maddy was med adherent though described feeling like she doesn't trust her caregivers because they try to motivate her in the wrong ways. Maddy was withdrawn, minimally social with select peers.                "

## 2022-03-14 NOTE — PLAN OF CARE
03/14/22 1400   Group Therapy Session   Group Attendance attended group session   Time Session Began 1015   Time Session Ended 1105   Total Time patient participated (minutes) 50   Total # Attendees 3   Group Type psychotherapeutic   Group Topic Covered balanced lifestyle   Patient Response/Contribution became angry or agitated;verbalizations were off topic      Maddy   attended 1 of 3 OT groups today. Tangential and irritable with a string of complaints that were loose and difficult. Opening to the suggestion of reframing her perspective in a more positive light.

## 2022-03-14 NOTE — PROGRESS NOTES
Pt appears to be sleeping 3 hrs. Up for snacks, refused and prn meds for sleep. Pt on status 15 min checks. Will continue to monitor.

## 2022-03-14 NOTE — PLAN OF CARE
"    Tasks Complete:    Collateral updated from [facility] NAKUL Silva  - NAKUL Silva,  reports that the pt has the following waiver services:Lyft transportation - for community integration, 6 hours a week of, Individualized Home Supports (similar to an ILS worker) and Weekly Skilled Nurse Visits - this service just began last month. [facility] inquired about additional wavier services: medication machine, peer supports, and positive supports to assist with her mental health symptoms and medication management as the pt reports she has difficulties with med management at times. [facility] also inquired about the pt's history and baseline.    -  reports that pt does not qualify for Mental health cm in [facility] as mental health cm is soley for those on a commitment. Pt does not have a medication machine but is eligible to receive one. Pt education is highschool diploma and she is not aware of any other natural supports besides her father. She noted that the pt's baseline is paranoid and delusional. Joaquim and Shahnaz was really advocating for an ACT team for her but her waiver services would close. She was on board but then before the initial assessment canceled and refused to meet with them.    PT informed [facility] that [facility] can call dad between 1:30pm and 2:45pm today.  [facility] spoke with Jay Jay Ortiz ( dad)  #[phone]  - Dad reports that none of what the pt has reported is reality-based and that she is not near her baseline per their phone conversation today. Dad says when pt is at baseline, she is aware of her paranoia, and she \" has a  on reality. Right now, she does not\". Dad reports that her mom passed away two years ago, and Ledy was left with $35,000. Pt spent her inheritance in a five-month time frame, despite her father's attempts to assist ledy with putting her money in a trust fund. Dad reports she has zero skills in dealing with money. Dad reports that ledy is insistent on having a  Baby and is against her wish to " "do so due to her age and mental health. Dad reports that her associate ( name unknown but pt refers to him as Jay Jay)  is not suitable for her mental health, and this is not an appropriate person to call for collateral.     In addition, dad reports that she does not have a Ph.D. in business management. However, the pt did attend the [employer]; she did not complete her degree due to mental health symptoms. When asked about pt's child, the dad disclosed that the pt has a daughter in [place] and a 14-year-old daughter, Roxy, who lives in an apartment with. CTC asked who was taking care of her daughter, and the dad reports, \" I offered to help, but it sounds like she is staying with a friend in Maddy's building. She is safe, and if she needs help, she knows she can call me. \"           Post round meeting with team @9:30 am updates: Team discussed discharge planning    Current Symptoms include the following: Pt engages in delusional thinking and paranoia.     Addressed patient needs/concerns:   Pt did not have any concerns or needs at this time    Discharge Plan or Goal   Plan  Discharge home with [facility] Waiver services. Pt would benefit from rep payee and additional cadi waiver services. PT would benefit from ACT team however has declined and would like to continue with [facility] Waiver services.     Care Team     [facility] Waiver CM- Shira MedinaVocgpty-[place]- [phone]     Therapist- Callie Manley LP-[phone]    Psychiatrist-Shirley Gooden NP- [facility]-     Financial Worker- Elvira Bae- [facility]- [phone]    Outpatient DBT Group through [facility])       Barriers to Discharge   Ongoing stabilization     Referral Status  No referrals needed at this time     Legal Status  Pt is voluntary                          "

## 2022-03-14 NOTE — PROGRESS NOTES
"[facility], [place]   Psychiatric Progress Note        Interim History:   The patient's care was discussed with the treatment team during the daily team meeting and/or staff's chart notes were reviewed. Staff report patient continues to be cooperative and pleasant. She has been more more visible in the milieu. [facility] left a message for the patient's dad, so far, didn't get call back, yet. Patient is reported to be cooperative with meds and more alert.      Upon interview, pt was seen with PA student in [facility]. She reported feeling more alert after change in timing of her Zyrtec dose. Said that her thinking was getting more and more organized and she was getting close to her baseline. Maddy, however, hesitated when we asked if she was ready to be discharged: \"I am ready when my treatment team feels that I am ready\". She talked about getting pregnant, asked our opinion. We advised her that even with being on her current meds she could get pregnant, however, also suggested that her psychotic symptoms needed to be under very good control due to risk of them getting worse during pregnancy. Maddy was receptive. She was OK with our plan to call her dad and clarify how far she was from her baseline, stated she feels better after her medications. She denies SI, HI, auditory hallucinations and visual hallucinations. No other concerns or questions at this time. Lab work from 3/14/2022 was reviewed.         Medications:       fluticasone  2 spray Both Nostrils Daily     gabapentin  300 mg Oral TID     levothyroxine  50 mcg Oral QAM AC     lithium ER  600 mg Oral BID     metFORMIN  1,000 mg Oral BID w/meals     risperiDONE  2 mg Oral At Bedtime     risperiDONE microspheres ER  25 mg Intramuscular Q14 Days          Allergies:     Allergies   Allergen Reactions     Dust Mites Shortness Of Breath     Animal Dander      Other reaction(s): *Unknown     Metformin Fatigue     Patient is taking at home as of " "3/3/22     Mold      Other reaction(s): Runny Nose     Trees           Labs:     Recent Results (from the past 24 hour(s))   Glucose by meter    Collection Time: 03/13/22  5:51 PM   Result Value Ref Range    GLUCOSE BY METER POCT 122 (H) 70 - 99 mg/dL   Glucose by meter    Collection Time: 03/13/22 10:53 PM   Result Value Ref Range    GLUCOSE BY METER POCT 120 (H) 70 - 99 mg/dL   Glucose by meter    Collection Time: 03/14/22  8:06 AM   Result Value Ref Range    GLUCOSE BY METER POCT 116 (H) 70 - 99 mg/dL          Psychiatric Examination:     /76 (BP Location: Left arm)   Pulse 86   Temp 98.5  F (36.9  C) (Tympanic)   Resp 17   Ht 1.626 m (5' 4\")   Wt 114.1 kg (251 lb 8 oz)   SpO2 95%   BMI 43.17 kg/m    Weight is 251 lbs 8 oz  Body mass index is 43.17 kg/m .    Orthostatic Vitals  Report    None         Appearance: dressed in hospital scrubs  Attitude: more cooperative  Eye Contact: better  Mood: less anxious and depressed, improving  Affect: more animated  Speech:  clear, coherent and normal prosody,   Language: fluent and intact in English  Psychomotor, Gait, Musculoskeletal:  no evidence of tardive dyskinesia, dystonia, or tics  Thought Process:  disorganized  Associations:  no loose associations  Thought Content:  no evidence of suicidal ideation or homicidal ideation and denies AVH, did not appear responding, some paranoia/delusional content remains, less vocal about it  Insight:  limited, but improving   Judgement:  limited, but improving   Oriented to:  time, person, and place  Attention Span and Concentration: limited  Recent and Remote Memory:  appears intact to conversation  Fund of Knowledge:  limited    Clinical Global Impressions  First:  Considering your total clinical experience with this particular patient population, how severe are the patient's symptoms at this time?: 7 (03/04/22 8354)  Compared to the patient's condition at the START of treatment, this patient's condition is: 4 " (03/04/22 1628)  Most recent:  Considering your total clinical experience with this particular patient population, how severe are the patient's symptoms at this time?: 5 (03/14/22 1628)  Compared to the patient's condition at the START of treatment, this patient's condition is: 3 (03/14/22 1628)         Precautions:     Behavioral Orders   Procedures     Code 1 - Restrict to Unit     Routine Programming     As clinically indicated     Status 15     Every 15 minutes.          Diagnoses:     Schizoaffective Disorder, bipolar type, with multiple episodes currently in acute episode with mixed mood state and active psychosis symptoms  Generalized Anxiety Disorder   Diabetes Mellitus, type 2  Hypothyroidism  Paranoid type Delusion disorder (H)  Asthma   Chronic leukocytosis          Plan:     1. Will increase Li to 600 +750 mg. Continue risperidone 2 mg at bedtime.   2. Start Zyrtec 10 mg at bedtime vs morning due to reported lethargy.   3. Will continue to provide support and structure.         I was present with Amanda Gomez, PA student, who participated in the service and in the documentation of the note. I have verified the history and personally performed the physical exam and medical decision making. I agree with the assessment and plan of care as documented in the note.     Trevon Motta MD  [facility] Psychiatry

## 2022-03-14 NOTE — PLAN OF CARE
Goal Outcome Evaluation:    Pt has been awake since 2 am. She drank sleeping time tea but remained awake. Prn Vistaril given at 0440.

## 2022-03-15 LAB
GLUCOSE BLDC GLUCOMTR-MCNC: 124 MG/DL (ref 70–99)
GLUCOSE BLDC GLUCOMTR-MCNC: 140 MG/DL (ref 70–99)
GLUCOSE BLDC GLUCOMTR-MCNC: 191 MG/DL (ref 70–99)

## 2022-03-15 PROCEDURE — 250N000013 HC RX MED GY IP 250 OP 250 PS 637: Performed by: PSYCHIATRY & NEUROLOGY

## 2022-03-15 PROCEDURE — H2032 ACTIVITY THERAPY, PER 15 MIN: HCPCS

## 2022-03-15 PROCEDURE — 124N000002 HC R&B MH UMMC

## 2022-03-15 PROCEDURE — 90853 GROUP PSYCHOTHERAPY: CPT

## 2022-03-15 PROCEDURE — G0177 OPPS/PHP; TRAIN & EDUC SERV: HCPCS

## 2022-03-15 PROCEDURE — 250N000013 HC RX MED GY IP 250 OP 250 PS 637: Performed by: PHYSICIAN ASSISTANT

## 2022-03-15 PROCEDURE — 99233 SBSQ HOSP IP/OBS HIGH 50: CPT | Performed by: PSYCHIATRY & NEUROLOGY

## 2022-03-15 RX ORDER — RISPERIDONE 3 MG/1
3 TABLET ORAL AT BEDTIME
Status: DISCONTINUED | OUTPATIENT
Start: 2022-03-15 | End: 2022-03-17

## 2022-03-15 RX ADMIN — LITHIUM CARBONATE 750 MG: 300 TABLET, EXTENDED RELEASE ORAL at 21:50

## 2022-03-15 RX ADMIN — METFORMIN HYDROCHLORIDE 1000 MG: 500 TABLET, EXTENDED RELEASE ORAL at 18:03

## 2022-03-15 RX ADMIN — GABAPENTIN 300 MG: 300 CAPSULE ORAL at 08:13

## 2022-03-15 RX ADMIN — FLUTICASONE PROPIONATE 2 SPRAY: 50 SPRAY, METERED NASAL at 08:13

## 2022-03-15 RX ADMIN — LEVOTHYROXINE SODIUM 50 MCG: 25 TABLET ORAL at 08:13

## 2022-03-15 RX ADMIN — METFORMIN HYDROCHLORIDE 1000 MG: 500 TABLET, EXTENDED RELEASE ORAL at 08:13

## 2022-03-15 RX ADMIN — RISPERIDONE 3 MG: 3 TABLET ORAL at 21:50

## 2022-03-15 RX ADMIN — GABAPENTIN 300 MG: 300 CAPSULE ORAL at 21:49

## 2022-03-15 RX ADMIN — ALBUTEROL SULFATE 2 PUFF: 90 AEROSOL, METERED RESPIRATORY (INHALATION) at 10:25

## 2022-03-15 RX ADMIN — GABAPENTIN 300 MG: 300 CAPSULE ORAL at 14:27

## 2022-03-15 RX ADMIN — LITHIUM CARBONATE 600 MG: 300 TABLET, EXTENDED RELEASE ORAL at 08:13

## 2022-03-15 ASSESSMENT — ACTIVITIES OF DAILY LIVING (ADL)
HYGIENE/GROOMING: INDEPENDENT
HYGIENE/GROOMING: INDEPENDENT
ORAL_HYGIENE: INDEPENDENT
ORAL_HYGIENE: INDEPENDENT
LAUNDRY: WITH SUPERVISION
LAUNDRY: WITH SUPERVISION
DRESS: INDEPENDENT
DRESS: INDEPENDENT

## 2022-03-15 NOTE — PLAN OF CARE
OT Progress Note:       03/15/22 1240   Group Therapy Session   Group Attendance attended group session   Time Session Began 0915   Time Session Ended 1015   Total Time patient participated (minutes) 50   Total # Attendees 2   Group Type expressive therapy;psychoeducation   Group Topic Covered emotions/expression;problem-solving   Group Session Detail Processed her desire to have baby and the possible barriers and realistic options   Patient Response/Contribution cooperative with task;discussed personal experience with topic;disorganized;expressed reluctance to alter behaviors   Patient Response Detail Variable mood throughout group. Was able to answer dirwect questions and partially see the barriers but, then made excuses.     Poor eye contact, consistent redirection needed to remain focus of conversation. Limited what she was willing to discuss as she felt it was a trap. Lacked insight regarding her own poor self management at this point and time of her life.

## 2022-03-15 NOTE — PLAN OF CARE
Pt appeared to sleep 6 hours. No concerns reported. Pt went to bathroom twice and laid back down as observed on safety checks. Safety checks done at least every 15 minutes.

## 2022-03-15 NOTE — PLAN OF CARE
"Goal Outcome Evaluation:    Plan of Care Reviewed With: patient     RN Note:    Patient presents with normal range affect and calm mood. She reports her anxiety and cold symptoms improved over the weekend. Patient was calm, cooperative, and pleasant during this shift. Patient denies  SI, SIB, HI, and AVH. Patient denies  pain. Patient was social with select peers this shift and present in the milieu. Patient was observed riding the stationary bike this afternoon. Patient  did attend groups. Patient is med-compliant. No medication side effects observed or endorsed by patient.     PRNs given this shift:    Albuterol 2 puffs - 1025 for dyspnea - reported immediate relief    /79 (BP Location: Left arm, Patient Position: Sitting, Cuff Size: Adult Large)   Pulse 94   Temp 98.2  F (36.8  C) (Oral)   Resp 16   Ht 1.626 m (5' 4\")   Wt 114.1 kg (251 lb 8 oz)   SpO2 94%   BMI 43.17 kg/m                "

## 2022-03-15 NOTE — PLAN OF CARE
03/15/22 1359   Group Therapy Session   Group Attendance attended group session   Time Session Began 1330   Time Session Ended 1400   Total Time patient participated (minutes) 30   Total # Attendees 2   Group Type psychotherapeutic   Group Topic Covered cognitive activities   Group Session Detail Group members participated in random questions and discussed 5 year goals, what they d like to change, best and worse phase of their lives.   Patient Response/Contribution cooperative with task;discussed personal experience with topic;disorganized;expressed reluctance to alter behaviors   Patient Response Detail Pt often gave fairly rambling answers to questions. Of note was her answer to how she knows she can trust someone: states that she is not allowed to make that decision

## 2022-03-15 NOTE — PLAN OF CARE
Post round meeting with team @9:30 am updates: Team discussed discharge planning     Current Symptoms include the following: Pt engages in delusional thinking and paranoia.      Addressed patient needs/concerns:   Pt did not have any concerns or needs at this time     Discharge Plan or Goal   Plan  Discharge home with [facility]I Waiver services. Pt would benefit from rep payee and additional cadi waiver services. PT would benefit from ACT team however has declined and would like to continue with [facility]I Waiver services.      Care Team   [facility] Waiver CM- Shira MedinaGcewzdi-[place]- [phone]   Therapist- Callie Manley LP-[phone]  Psychiatrist-Shirley GoodenNP- [facility]-   Financial Worker- Elvira Bae- [facility]- [phone]  Outpatient DBT Group through [facility])        Barriers to Discharge   Ongoing stabilization     Referral Status  No referrals needed at this time     Legal Status  Pt is voluntary

## 2022-03-15 NOTE — PROGRESS NOTES
"SPIRITUAL HEALTH SERVICES  SPIRITUAL ASSESSMENT Progress Note  [facility] ([facility] [address]     REFERRAL SOURCE: I did visit this afternoon patient Maddy per follow up visit. Pt appreciated the  visit and shared some of her fear and anxiety. Pt said, \"I don't like to return into my apartment because they are going to renew the carpet. If I stay there while they change a new carpet, I might be sick. So, I will ask my father to let me have my old room and stay with him until they done the new carpet. Other than that I am okay because of nice staff and medical team. Besides all God is always with me at all time in my private life and praise God.\" In order to give her some encouragement, I tried to comfort the pt by sharing the words of God and she loved it. At the end of our conversation, based on her prayer request, I offered her a prayer.      PLAN: I will remain open to provide spiritual care for the pt as needed.    Gustavo Peterson M.Div. ([place]), M.Th., D.Min., [facility]  Staff   Pager [phone]    "

## 2022-03-15 NOTE — PROGRESS NOTES
[facility], [place]   Psychiatric Progress Note        Interim History:   The patient's care was discussed with the treatment team during the daily team meeting and/or staff's chart notes were reviewed. Staff report patient continues to be cooperative and pleasant. She has been more more visible in the milieu and was also seen exercising on a stationary bike. Compliant with meds and denies major side effects. CTC talked to the patient's dad who reported that he didn't feel that Maddy was close to her baseline, said that she still had a lot of paranoia and delusions which would prevent her from staying in community, said that he would prefer patient would have more support in community as well.     Upon interview, pt was seen with PA and CNP students. She was resting in her room, but woke up and talked to us. Said that she had been feeling better, denied Auditory hallucinations, Visual hallucinations, Suicidal ideation, Homicidal thoughts. Contracted for safety. Had difficulties answering questions pointed at presence of paranoia, with help could say that she still believed that there was something wrong going on with/or under carpet in her apartment, however, also said that she was hoping that management would take care of it. Denied fears of people being after her. Subjectively, appeared to be unfocused and, very likely, still having psychotic symptoms. We discussed increase in Risperdal dose (Maddy agreed with that recommendation) and offered patient to go to Day Treatment. After hearing what Day Treatment involved, Maddy agreed to participate. She asked for computer time to pay her bills and didn't have any other questions or concerns.           Medications:       fluticasone  2 spray Both Nostrils Daily     gabapentin  300 mg Oral TID     levothyroxine  50 mcg Oral QAM AC     lithium ER  600 mg Oral QAM     lithium ER  750 mg Oral At Bedtime     metFORMIN  1,000 mg Oral BID w/meals  "    risperiDONE  3 mg Oral At Bedtime     risperiDONE microspheres ER  25 mg Intramuscular Q14 Days          Allergies:     Allergies   Allergen Reactions     Dust Mites Shortness Of Breath     Animal Dander      Other reaction(s): *Unknown     Metformin Fatigue     Patient is taking at home as of 3/3/22     Mold      Other reaction(s): Runny Nose     Trees           Labs:     Recent Results (from the past 24 hour(s))   Glucose by meter    Collection Time: 03/14/22  5:39 PM   Result Value Ref Range    GLUCOSE BY METER POCT 168 (H) 70 - 99 mg/dL   Glucose by meter    Collection Time: 03/15/22  8:09 AM   Result Value Ref Range    GLUCOSE BY METER POCT 124 (H) 70 - 99 mg/dL          Psychiatric Examination:     /79 (BP Location: Left arm, Patient Position: Sitting, Cuff Size: Adult Large)   Pulse 94   Temp 98.2  F (36.8  C) (Oral)   Resp 16   Ht 1.626 m (5' 4\")   Wt 114.1 kg (251 lb 8 oz)   SpO2 94%   BMI 43.17 kg/m    Weight is 251 lbs 8 oz  Body mass index is 43.17 kg/m .    Orthostatic Vitals  Report      Most Recent      Standing Orthostatic /76 03/15 0759    Standing Orthostatic Pulse (bpm) 118 03/15 0759         Appearance: dressed in hospital scrubs, somewhat somnolent  Attitude: more cooperative  Eye Contact: better  Mood: less anxious and depressed, improving  Affect: more animated  Speech:  clear, coherent and normal prosody,   Language: fluent and intact in English  Psychomotor, Gait, Musculoskeletal:  no evidence of tardive dyskinesia, dystonia, or tics  Thought Process: poorly organized  Associations: some looseness of associations  Thought Content:  no evidence of suicidal ideation or homicidal ideation and denies AVH, did not appear responding, some paranoia/delusional content remains, less vocal about it  Insight:  limited, but improving   Judgement:  limited, but improving   Oriented to:  time, person, and place  Attention Span and Concentration: limited  Recent and Remote Memory:  " appears intact to conversation  Fund of Knowledge: average    Clinical Global Impressions  First:  Considering your total clinical experience with this particular patient population, how severe are the patient's symptoms at this time?: 7 (03/04/22 1628)  Compared to the patient's condition at the START of treatment, this patient's condition is: 4 (03/04/22 1628)  Most recent:  Considering your total clinical experience with this particular patient population, how severe are the patient's symptoms at this time?: 5 (03/14/22 1628)  Compared to the patient's condition at the START of treatment, this patient's condition is: 3 (03/14/22 1628)         Precautions:     Behavioral Orders   Procedures     Code 1 - Restrict to Unit     Routine Programming     As clinically indicated     Status 15     Every 15 minutes.          Diagnoses:     Schizoaffective Disorder, bipolar type, with multiple episodes currently in acute episode with mixed mood state and active psychosis symptoms  Generalized Anxiety Disorder   Diabetes Mellitus, type 2  Hypothyroidism  Paranoid type Delusion disorder (H)  Asthma   Chronic leukocytosis          Plan:     1. Yesterday Li was increased to 600 +750 mg. On 3/15/2022 will increase risperidone to 3 mg at bedtime.   2. Change Zyrtec 10 mg at bedtime vs morning due to reported lethargy.   3. Will continue to provide support and structure.   4. Will refer to Day Treatment.   5. Will transfer care to Priscilla Reeves NP tomorrow.        I was present with Amanda Gomez, PA student, who participated in the service and in the documentation of the note. I have verified the history and personally performed the physical exam and medical decision making. I agree with the assessment and plan of care as documented in the note.     Trevon Motta MD  [facility] Psychiatry

## 2022-03-15 NOTE — PLAN OF CARE
Pt has a blunted affect with sad mood. Pt was guarded during check in with poor eye contact. Pt rates anxiety at 4/10 and depression 0/10. Pt rates pain at 0/10. Pt reports no SI/HI/SIB and contracts for safety. Pt denies any hallucinations and not noted responding to any internal stimuli. Pt was medication compliant. No reported or observed medication side effects. Pt was visible on unit but mostly withdrawn to self. BS was 168 prior to dinner. Continue current POC.      Plan of Care Reviewed With: patient     Overall Patient Progress: no change

## 2022-03-16 LAB
GLUCOSE BLDC GLUCOMTR-MCNC: 117 MG/DL (ref 70–99)
GLUCOSE BLDC GLUCOMTR-MCNC: 118 MG/DL (ref 70–99)
GLUCOSE BLDC GLUCOMTR-MCNC: 156 MG/DL (ref 70–99)

## 2022-03-16 PROCEDURE — G0177 OPPS/PHP; TRAIN & EDUC SERV: HCPCS

## 2022-03-16 PROCEDURE — 250N000013 HC RX MED GY IP 250 OP 250 PS 637: Performed by: NURSE PRACTITIONER

## 2022-03-16 PROCEDURE — 124N000002 HC R&B MH UMMC

## 2022-03-16 PROCEDURE — 250N000013 HC RX MED GY IP 250 OP 250 PS 637: Performed by: PHYSICIAN ASSISTANT

## 2022-03-16 PROCEDURE — 250N000013 HC RX MED GY IP 250 OP 250 PS 637: Performed by: PSYCHIATRY & NEUROLOGY

## 2022-03-16 PROCEDURE — 99232 SBSQ HOSP IP/OBS MODERATE 35: CPT | Performed by: NURSE PRACTITIONER

## 2022-03-16 RX ADMIN — ALBUTEROL SULFATE 2 PUFF: 90 AEROSOL, METERED RESPIRATORY (INHALATION) at 07:52

## 2022-03-16 RX ADMIN — LEVOTHYROXINE SODIUM 50 MCG: 25 TABLET ORAL at 07:49

## 2022-03-16 RX ADMIN — LITHIUM CARBONATE 750 MG: 300 TABLET, EXTENDED RELEASE ORAL at 21:05

## 2022-03-16 RX ADMIN — LITHIUM CARBONATE 600 MG: 300 TABLET, EXTENDED RELEASE ORAL at 07:49

## 2022-03-16 RX ADMIN — FLUTICASONE PROPIONATE 2 SPRAY: 50 SPRAY, METERED NASAL at 07:50

## 2022-03-16 RX ADMIN — METFORMIN HYDROCHLORIDE 1000 MG: 500 TABLET, EXTENDED RELEASE ORAL at 07:49

## 2022-03-16 RX ADMIN — GABAPENTIN 300 MG: 300 CAPSULE ORAL at 21:04

## 2022-03-16 RX ADMIN — RISPERIDONE 3 MG: 3 TABLET ORAL at 21:05

## 2022-03-16 RX ADMIN — GABAPENTIN 300 MG: 300 CAPSULE ORAL at 14:07

## 2022-03-16 RX ADMIN — GABAPENTIN 300 MG: 300 CAPSULE ORAL at 07:49

## 2022-03-16 RX ADMIN — METFORMIN HYDROCHLORIDE 1000 MG: 500 TABLET, EXTENDED RELEASE ORAL at 17:54

## 2022-03-16 RX ADMIN — ACETAMINOPHEN 650 MG: 325 TABLET, FILM COATED ORAL at 17:53

## 2022-03-16 ASSESSMENT — ACTIVITIES OF DAILY LIVING (ADL)
ORAL_HYGIENE: INDEPENDENT
LAUNDRY: WITH SUPERVISION
LAUNDRY: WITH SUPERVISION
ORAL_HYGIENE: INDEPENDENT
DRESS: INDEPENDENT
HYGIENE/GROOMING: INDEPENDENT
HYGIENE/GROOMING: INDEPENDENT
DRESS: INDEPENDENT

## 2022-03-16 NOTE — PROGRESS NOTES
"[facility], [place]   Psychiatric Progress Note        Interim History:     The patient's care was discussed with the treatment team during the daily team meeting and staff's chart notes were reviewed. Staff report patient continues to be cooperative and pleasant.  She has been attending groups but has been rather unfocused and disorganized.  She was documented as sleeping 4.5 hours during the overnight shift.  Pt had made notes about several topics she wanted to discuss.  She said she is disturbed by \"noticing things I'm not supposed to notice\" such as people's \"bodies and belongings.\"  Pt made a number of Christianity statements and discussed her opposition to the theory of evolution.  She said she would like to conceive a son in 2 years.  Pt made some comments acknowledging that some of her thoughts are delusional.  She reports she sometimes feels drowsy but is otherwise tolerating meds well.  She denies hallucinations.  States her mood is \"usually happy but there are times when I have thoughts that get me upset and I start to cry.\"  Her appetite is good.  She acknowledges impairment in short term memory and concentration.  She did not have the opportunity to use a computer to pay her bill today, and she plans to do so today.  She mentioned that she probably wouldn't feel comfortable returning to her apartment until the carpet is cleaned, but that she could stay at her father's home.            Medications:       fluticasone  2 spray Both Nostrils Daily     gabapentin  300 mg Oral TID     levothyroxine  50 mcg Oral QAM AC     lithium ER  600 mg Oral QAM     lithium ER  750 mg Oral At Bedtime     metFORMIN  1,000 mg Oral BID w/meals     risperiDONE  3 mg Oral At Bedtime     risperiDONE microspheres ER  25 mg Intramuscular Q14 Days          Allergies:     Allergies   Allergen Reactions     Dust Mites Shortness Of Breath     Animal Dander      Other reaction(s): *Unknown     Metformin " "Fatigue     Patient is taking at home as of 3/3/22     Mold      Other reaction(s): Runny Nose     Trees           Labs:     Recent Results (from the past 24 hour(s))   Glucose by meter    Collection Time: 03/15/22  6:01 PM   Result Value Ref Range    GLUCOSE BY METER POCT 191 (H) 70 - 99 mg/dL   Glucose by meter    Collection Time: 03/15/22  9:28 PM   Result Value Ref Range    GLUCOSE BY METER POCT 140 (H) 70 - 99 mg/dL   Glucose by meter    Collection Time: 03/16/22  7:46 AM   Result Value Ref Range    GLUCOSE BY METER POCT 117 (H) 70 - 99 mg/dL          Psychiatric Examination:     /88 (BP Location: Left arm, Patient Position: Sitting)   Pulse 101   Temp 98.2  F (36.8  C)   Resp 16   Ht 1.626 m (5' 4\")   Wt 114.1 kg (251 lb 8 oz)   SpO2 98%   BMI 43.17 kg/m    Weight is 251 lbs 8 oz  Body mass index is 43.17 kg/m .    Orthostatic Vitals  Report      Most Recent      Standing Orthostatic /76 03/15 0759    Standing Orthostatic Pulse (bpm) 118 03/15 0759         Appearance:  Alert, dressed in hospital scrubs, fair grooming/hygiene  Attitude: cooperative  Eye Contact:  minimal  Mood: \"usually happy,\" sad and upset at times  Affect: rather blunted  Speech:  clear, coherent and normal prosody  Language: fluent and intact in English  Psychomotor, Gait, Musculoskeletal:  no evidence of tardive dyskinesia, dystonia, or tics  Thought Process: poorly organized  Associations: some looseness of associations  Thought Content:  no evidence of suicidal ideation or homicidal ideation and denies hallucinations, paranoid/delusional thought content is present  Insight:  limited, but improving   Judgement:  limited, but improving   Oriented to:  time, date, person, and place  Attention Span and Concentration: limited  Recent and Remote Memory:  fair  Fund of Knowledge: average    Clinical Global Impressions  First:  Considering your total clinical experience with this particular patient population, how severe are " the patient's symptoms at this time?: 7 (03/04/22 1628)  Compared to the patient's condition at the START of treatment, this patient's condition is: 4 (03/04/22 1628)  Most recent:  Considering your total clinical experience with this particular patient population, how severe are the patient's symptoms at this time?: 5 (03/16/22 0833)  Compared to the patient's condition at the START of treatment, this patient's condition is: 3 (03/16/22 0833)           Precautions:     Behavioral Orders   Procedures     Code 1 - Restrict to Unit     Routine Programming     As clinically indicated     Status 15     Every 15 minutes.          Diagnoses:     Schizoaffective disorder, bipolar type, with multiple episodes, currently in acute episode with mixed mood state and active psychosis symptoms  Generalized anxiety disorder   Diabetes mellitus, type 2  Hypothyroidism  Asthma   Chronic leukocytosis          Plan:     Medications:  Continue Neurontin 300 mg TID.  Continue Lithium 600 mg in the AM and 750 mg at HS.  Obtain lithium level 3/18.  Continue Risperdal 3 mg at HS.  Risperdal Consta 25 mg was initiated on 3/10; plan to increase.  Continue PRNs of Cogentin, Hydroxyzine, Melatonin and Zyprexa.    Discharge to her apartment when stable.  She has outpatient psychiatry, ILS and a home care RN.          PHILL Barahona, CNP  [facility] Psychiatry

## 2022-03-16 NOTE — PLAN OF CARE
Tasks Complete:  [facility] spoke with OP day treatment provider, Nikki Barfield via email regarding IOP day treatment referral. CTC informed Nikki to discontinue referral at this time as video treatment would not be beneficial     CTC emailed CADI CM regarding pt reporting concerns surrounding her carpet and apartment having a strange spell.     Post round meeting with team @9:30 am updates: team discussed discharge planning. Pt reports that she is not comfortable discharging home until the smell coming from her carpet is addressed.     Current Symptoms include the following:   Engages in paranoia and delusions    Addressed patient needs/concerns:   Pt did not note any questions or concerns at this time  Discharge Plan or Goal   Plan  Discharge home with CADI Waiver services. Pt would benefit from rep payee and additional cadi waiver services. PT would benefit from ACT team however has declined and would like to continue with CADI Waiver services.      Care Team   [facility]I Waiver CM- Shira MedinaIwicfkk-[facility]- [phone]   Therapist- Callie Manley LP-[employer] [phone]  Psychiatrist-Shirley Gooden NP- [facility]-   Financial Worker- Elvira Bae- [facility]- [phone]  Outpatient DBT Group through [employer] [facility])        Barriers to Discharge   Ongoing stabilization     Referral Status  No referrals needed at this time     Legal Status  Pt is voluntary

## 2022-03-16 NOTE — CONSULTS
IP Consult acknowledged and chart reviewed.  called unit, left voicemail and sent email to [facility] Yanet Olvera to discuss referral and program.  was referred to [facility] Lauren Landry. Consult will be closed because in-person day treatment group would benefit patient better than video programming.

## 2022-03-16 NOTE — PLAN OF CARE
Goal Outcome Evaluation:    Plan of Care Reviewed With: patient        The patient has been pleasant upon approach and cooperative with cares. Denied pain and anxiety. Said she was feeling sad and that she is mourning. Would not give details as to what she was referring to. Also overheard patient talking about catepillars on opium and all the colors they turn. The patient speech is disorganized. Has a good appetite, ate 100 % at dinner. The patient attended the evening group. After group the patient self-initiated a shower. Was compliant with scheduled medications. The patient discussed her concern with her looking at people for too long. She denied starring although said she is concerned that she is too curious about peoples lives and feels it may be unhealthy or obsessive. Also reports feels social awkward and thinks she may have autism.  The patient agreed with the plan to talk about her concerns with her Doctor.    Blood sugars this shift: 191 & 140    Prn's given this shift: None

## 2022-03-16 NOTE — PLAN OF CARE
Pt appeared to sleep 4.5 hours. Pt up to lounge several times and redirected to room after falling asleep in lounge. No concerns reported. Safety checks done at least every 15 minutes.

## 2022-03-16 NOTE — PLAN OF CARE
"Goal Outcome Evaluation:    Plan of Care Reviewed With: patient           The patient was observed sitting at the dinning room table journaling and making phone calls. Her affect is blunted and speech is disorganized and tangential. Her mood is mostly calm and her thought process has delusions of grandiosity. When asked how her day was she responded \"I have an idea\" she then asked if the idea was a novel one, then talked about education. A while later the patient again talked about the \"idea\" and said that it is a implantable birthcontrol device that will help all of humanity. Also added that she is not sure how to present it to the world. She is using her journal said its helpful for her. When asked if she was feeling anxious she replied, \"I really don't know, I have an idea\". The patient denied feeling depressed, said no SI or hallucinations and agreed to tell staff if she started having thoughts of harming herself. Reported back pain and was too disorganized to rate her back pain, said, \"I forget, my back is tired it must be pain.\" The plan of care was reviewed for the evening and encouraged patient to attend the evening group. Was compliant with scheduled medications.       Prn's give this shift: Tylenol    Blood sugars this shift: 156 & 118      "

## 2022-03-16 NOTE — PLAN OF CARE
OT Progress Note:       03/16/22 1242   Group Therapy Session   Group Attendance attended group session   Time Session Began 1115   Time Session Ended 1215   Total Time patient participated (minutes) 45   Total # Attendees 3   Group Type expressive therapy   Group Topic Covered balanced lifestyle;emotions/expression   Group Session Detail Creative Expression using crafts and executive skill use   Patient Response/Contribution did not share thoughts verbally;disorganized;requested more information about topic   Patient Response Detail Initiated group. Looked at options, however, displayed some difficulty in making decisions.     Explored options available and had minimal interaction as staff was working with peers. Responded with brief answers. Appeared preoccupied.

## 2022-03-16 NOTE — PLAN OF CARE
"OT Ousmane Note:       03/16/22 1236   Group Therapy Session   Group Attendance attended group session   Time Session Began 1015   Time Session Ended 1115   Total Time patient participated (minutes) 45   Total # Attendees 3   Group Type psychoeducation   Group Topic Covered problem-solving;self-care activities   Group Session Detail Discussed reality orientation to healthy structure for self management   Patient Response/Contribution expressed reluctance to alter behaviors;became angry or agitated;discussed personal experience with topic;disorganized;verbalizations were off topic   Patient Response Detail Staring off into space. When cued began speaking and rambled on for 25 minutes. Unable to be redirected.     Spoke of being \"suspicious of the under utilization of certain services for certain people. People should be able to chose traits they want their baby to have without interference.\" Spoke of incident with [facility], where \"person would not answer me when I asked how much money  I would get if I had another baby\". Rambled on x 35 minutes about her thoughts on above. After group was overheard speaking to someone on the phone saying, \"I want to discuss and get your ideas  regarding a device implanted in the body for parents to determine the traits their child would have\".  "

## 2022-03-16 NOTE — PROGRESS NOTES
Behavioral Health  Note   Behavioral Health  Spirituality Group Note     Unit [address]    Name: Maddy Ortiz    YOB: 1985   MRN: [id number]    Age: 36 year old     Patient attended -led group, which included discussion of spirituality, coping with illness and building resilience.   Patient attended group for 1.0 hrs.   patient demonstrated an appreciation of topic's application for their personal circumstances.     [facility]  Staff    Page [phone]

## 2022-03-16 NOTE — PLAN OF CARE
OT Progress Note:       03/16/22 1416   Group Therapy Session   Group Attendance attended group session   Time Session Began 1315   Time Session Ended 1415   Total Time patient participated (minutes) 55   Total # Attendees 3   Group Type psychoeducation;task skill   Group Topic Covered coping skills/lifestyle management   Group Session Detail Development of a personal healthy coping skills list to post at home for improved self management   Patient Response/Contribution cooperative with task;discussed personal experience with topic;expressed understanding of topic;listened actively;organized   Patient Response Detail Made personal list and shared with peers her coping skills.

## 2022-03-16 NOTE — PROGRESS NOTES
03/15/22 2100   Group Therapy Session   Group Attendance attended group session   Total Time patient participated (minutes) 50   Total # Attendees 4   Group Type recreation   Group Topic Covered leisure exploration/use of leisure time   Patient Response/Contribution cooperative with task     Pt participated in Therapeutic Recreation group with focus on leisure participation, communication skills, and critical thinking. Engaged and focused in the group recreational activity via a group game and participated in the progressive muscle relaxation routine. Pt was a full participant and stayed the entire group, unlike previous TR groups. At the end of the group, pt shared an analogy for how it was for her taking her turns. She stated it was like she was wandering in a dark room and not being able to see where she was going, but slowly finding her way, as she figures out the clues in the activity.

## 2022-03-17 LAB
GLUCOSE BLDC GLUCOMTR-MCNC: 116 MG/DL (ref 70–99)
GLUCOSE BLDC GLUCOMTR-MCNC: 150 MG/DL (ref 70–99)

## 2022-03-17 PROCEDURE — 99232 SBSQ HOSP IP/OBS MODERATE 35: CPT | Performed by: NURSE PRACTITIONER

## 2022-03-17 PROCEDURE — 250N000013 HC RX MED GY IP 250 OP 250 PS 637: Performed by: PHYSICIAN ASSISTANT

## 2022-03-17 PROCEDURE — G0177 OPPS/PHP; TRAIN & EDUC SERV: HCPCS

## 2022-03-17 PROCEDURE — H2032 ACTIVITY THERAPY, PER 15 MIN: HCPCS

## 2022-03-17 PROCEDURE — 124N000002 HC R&B MH UMMC

## 2022-03-17 PROCEDURE — 250N000013 HC RX MED GY IP 250 OP 250 PS 637: Performed by: NURSE PRACTITIONER

## 2022-03-17 PROCEDURE — 250N000013 HC RX MED GY IP 250 OP 250 PS 637: Performed by: PSYCHIATRY & NEUROLOGY

## 2022-03-17 RX ORDER — RISPERIDONE 4 MG/1
4 TABLET ORAL AT BEDTIME
Status: DISCONTINUED | OUTPATIENT
Start: 2022-03-17 | End: 2022-03-21

## 2022-03-17 RX ADMIN — GABAPENTIN 300 MG: 300 CAPSULE ORAL at 21:01

## 2022-03-17 RX ADMIN — GABAPENTIN 300 MG: 300 CAPSULE ORAL at 08:33

## 2022-03-17 RX ADMIN — METFORMIN HYDROCHLORIDE 1000 MG: 500 TABLET, EXTENDED RELEASE ORAL at 17:40

## 2022-03-17 RX ADMIN — LEVOTHYROXINE SODIUM 50 MCG: 25 TABLET ORAL at 08:33

## 2022-03-17 RX ADMIN — LITHIUM CARBONATE 750 MG: 300 TABLET, EXTENDED RELEASE ORAL at 21:01

## 2022-03-17 RX ADMIN — LITHIUM CARBONATE 600 MG: 300 TABLET, EXTENDED RELEASE ORAL at 08:33

## 2022-03-17 RX ADMIN — GABAPENTIN 300 MG: 300 CAPSULE ORAL at 14:18

## 2022-03-17 RX ADMIN — METFORMIN HYDROCHLORIDE 1000 MG: 500 TABLET, EXTENDED RELEASE ORAL at 08:33

## 2022-03-17 RX ADMIN — Medication 10 MG: at 21:26

## 2022-03-17 RX ADMIN — FLUTICASONE PROPIONATE 2 SPRAY: 50 SPRAY, METERED NASAL at 08:32

## 2022-03-17 RX ADMIN — RISPERIDONE 4 MG: 4 TABLET ORAL at 21:01

## 2022-03-17 RX ADMIN — ACETAMINOPHEN 650 MG: 325 TABLET, FILM COATED ORAL at 10:21

## 2022-03-17 ASSESSMENT — ACTIVITIES OF DAILY LIVING (ADL)
HYGIENE/GROOMING: INDEPENDENT
DRESS: SCRUBS (BEHAVIORAL HEALTH);INDEPENDENT
ORAL_HYGIENE: INDEPENDENT

## 2022-03-17 NOTE — PLAN OF CARE
"Patient was visible in the milieu majority of the evening. She was observed exercising on the bike a few times and made some phone calls. During check in, patient reports feeling very bored, states she has been in here way too long and this is getting into my nerves. Patient states \"I feel frustrated, bored, and impatient, I really want to go home, I miss my daughter.\"   Patient reports low mood. She rated anxiety at a 5 out of 10. Concentration is poor. Appetite is good. She denies SI/SIB/AVH/HI. She  took her medications as scheduled. PRN melatonin was given at 2126 per patient's request.   "

## 2022-03-17 NOTE — PLAN OF CARE
OT Progress Note:       03/17/22 1312   Group Therapy Session   Group Attendance attended group session   Time Session Began 1115   Time Session Ended 1215   Total Time patient participated (minutes) 50   Total # Attendees 4   Group Type recreation;task skill   Group Topic Covered balanced lifestyle;self-care activities   Group Session Detail Leisure activity focused on socialization and engaging in activity for life balance   Patient Response/Contribution cooperative with task;discussed personal experience with topic;expressed understanding of topic;organized   Patient Response Detail Initiated, actively engaged, able to follow 3 step verbal directions and appropriately interact with peers. Made decisions in a timely manner.

## 2022-03-17 NOTE — PLAN OF CARE
"  Problem: Behavior Regulation Impairment (Psychotic Signs/Symptoms)  Goal: Improved Behavioral Control (Psychotic Signs/Symptoms)  Outcome: Ongoing, Progressing   Goal Outcome Evaluation:    Plan of Care Reviewed With: patient     \"I'm the same.\" Depression and anxiety are high. States thoughts are more clear Attends groups. Took a nap this afternoon.              "

## 2022-03-17 NOTE — PLAN OF CARE
"OT Progress Note:       03/17/22 1301   Group Therapy Session   Group Attendance attended group session   Time Session Began 0915   Time Session Ended 1015   Total Time patient participated (minutes) 45   Total # Attendees 3   Group Type expressive therapy   Group Topic Covered emotions/expression   Group Session Detail Creative expression as a means of increased self awareness and ability to process emotions through different mediums   Patient Response/Contribution discussed personal experience with topic;requested more information about topic   Patient Response Detail Initiated group requesting to journal to get thoughts organized and forward thinking. Identified and asked questions regarding emotions.     Noted, \"I feel my emotions are strained. I am feeling 2 conflicting emotions at a time.\" Unable to decide on creative task as \"supplies are limiting\". Proceeded to make suggestions/desire for used materials to re-purpose. Declined several options given related to her verbalized interests.   "

## 2022-03-17 NOTE — PROGRESS NOTES
Pt appears to be sleeping 6.25. Pt up once to get snacks. Pt on status 15 min check.Will continue to monitor.

## 2022-03-17 NOTE — PLAN OF CARE
"    Tasks Complete:  CM confirms that pt's claims about her apartment are not reality based. She called into [facility] crisis line about the issue. Below is the note from crisis       \"Maddy called tonchandni and was very emotional and upset. She stated that there was a \"horrible odor\" coming from her carpet so she is not able to leave her daughters room. She stated that she has locked herself in her daughters room and refuses to leave, but that her daughter is very upset about that. Maddy was telling her daughter that she had to sleep on the floor so Maddy could have the bed. Maddy mad gagging sounds multiple times while on the phone with TW due to the odor in the apartment. TW asked what Maddy was asking for from Crisis. Maddy stated that she was suicidal, but had no plan or intent, she just knew that she could \"not live with the odor coming from the carpets\" she stated that she was not doing well. At two points during the call with TW Maddy contradicted herself stating that she stopped taking her medications in December because they were causing her to gain weight and she did not want to take them and later stating that she only stopped taking her meds for one week, but is now taking them again.     She blamed the smell in her apartment on her medications, stating that they are causing her to be nauseous. TW attempted to help problem solve and create a plan, caller was unwilling to work with TW. TW made sure daughter was safe, and suggested since daughter had school tomorrow that she go and sleep in her mom's bed. Daughter stated she does not smell anything.    Once daughter left the room caller was able to calm down and plan for safety, she was able to come up with a plan for tomorrow to talk to management in the apartment building and if they are not able to help then she will go to [facility] and rent a . Caller was going to take her medications and go to sleep. Maddy's speech was very " "disorganized throughout the call and her moods fluctuated between teary/sad, excited, and upset\"      Post round meeting with team @9:30 am updates: Team discussed discharge planning. PT remains symptomatic. CM confirmed that pt's report regarding the smell in her apartment is not reality based    Current Symptoms include the following:   Engages in delusional thinking and paranoia     Addressed patient needs/concerns:   Pt did not note any needs/concerns at this time    Discharge Plan or Goal   Plan  Discharge home with CADI Waiver services. Pt would benefit from rep payee and additional cadi waiver services. PT would benefit from ACT team however has declined and would like to continue with CADI Waiver services.      Care Team     CADI Waiver CM- Shira MedinaZpkedpz-[place]- [phone]     Therapist- Callie Manley LP-[phone]    Psychiatrist-Shirley Gooden NP- [facility]-     Financial Worker- Elvira Bae- [facility]- [phone]    Outpatient DBT Group through [facility])        Barriers to Discharge   Ongoing stabilization     Referral Status  No referrals needed at this time     Legal Status  Pt is voluntary                         "

## 2022-03-17 NOTE — PROGRESS NOTES
"[facility], [place]   Psychiatric Progress Note        Interim History:     The patient's care was discussed with the treatment team during the daily team meeting and staff's chart notes were reviewed.  Staff report patient continues to be cooperative and pleasant.  She has been attending groups but has been rather unfocused and disorganized.  She has been spending time journaling and talking on the phone.  She was documented as sleeping 6.25 hours during the overnight shift.  She took PRN Tylenol x 1 and PRN Albuterol x 1 yesterday.  Pt talked about her desire to speak with an OB/GYN about developing an \"implanted device to select sperm and egg by genetic testing.\"  She perceives,inaccurately, that she is able to focus well during conversations and acknowledges difficulty focusing during groups because they do not interest her.  Pt reports alternating diarrhea and constipation.  Otherwise denies any potential side effects from meds.  She agreed to increase her dose of Risperdal.           Medications:       fluticasone  2 spray Both Nostrils Daily     gabapentin  300 mg Oral TID     levothyroxine  50 mcg Oral QAM AC     lithium ER  600 mg Oral QAM     lithium ER  750 mg Oral At Bedtime     metFORMIN  1,000 mg Oral BID w/meals     risperiDONE  4 mg Oral At Bedtime     risperiDONE microspheres ER  25 mg Intramuscular Q14 Days          Allergies:     Allergies   Allergen Reactions     Dust Mites Shortness Of Breath     Animal Dander      Other reaction(s): *Unknown     Metformin Fatigue     Patient is taking at home as of 3/3/22     Mold      Other reaction(s): Runny Nose     Trees           Labs:     Recent Results (from the past 24 hour(s))   Glucose by meter    Collection Time: 03/16/22  5:56 PM   Result Value Ref Range    GLUCOSE BY METER POCT 156 (H) 70 - 99 mg/dL   Glucose by meter    Collection Time: 03/16/22  9:01 PM   Result Value Ref Range    GLUCOSE BY METER POCT 118 (H) 70 - 99 " "mg/dL          Psychiatric Examination:     /79 (BP Location: Right arm)   Pulse 99   Temp 98.7  F (37.1  C) (Oral)   Resp 18   Ht 1.626 m (5' 4\")   Wt 114.1 kg (251 lb 8 oz)   SpO2 95%   BMI 43.17 kg/m    Weight is 251 lbs 8 oz  Body mass index is 43.17 kg/m .    Orthostatic Vitals  Report      Most Recent      Standing Orthostatic /76 03/15 0759    Standing Orthostatic Pulse (bpm) 118 03/15 0759         Appearance:  alert, dressed in hospital scrubs, fair grooming/hygiene  Attitude:  cooperative  Eye Contact:  minimal  Mood: \"good\"  Affect: rather blunted  Speech:  clear, coherent and normal prosody  Language: fluent and intact in English  Psychomotor, Gait, Musculoskeletal:  no evidence of tardive dyskinesia, dystonia, or tics  Thought Process: poorly organized  Associations: some looseness of associations  Thought Content:  no evidence of suicidal ideation or homicidal ideation and denies hallucinations, paranoid/delusional thought content is present  Insight:  limited, but improving   Judgement:  limited, but improving   Oriented to:  time, date, person, and place  Attention Span and Concentration: limited  Recent and Remote Memory:  fair  Fund of Knowledge: average    Clinical Global Impressions  First:  Considering your total clinical experience with this particular patient population, how severe are the patient's symptoms at this time?: 7 (03/04/22 1628)  Compared to the patient's condition at the START of treatment, this patient's condition is: 4 (03/04/22 1628)  Most recent:  Considering your total clinical experience with this particular patient population, how severe are the patient's symptoms at this time?: 5 (03/16/22 0833)  Compared to the patient's condition at the START of treatment, this patient's condition is: 3 (03/16/22 0833)           Precautions:     Behavioral Orders   Procedures     Code 1 - Restrict to Unit     Routine Programming     As clinically indicated     Status 15 "     Every 15 minutes.          Diagnoses:     Schizoaffective disorder, bipolar type, with multiple episodes, currently in acute episode with mixed mood state and active psychosis symptoms  Generalized anxiety disorder   Diabetes mellitus, type 2  Hypothyroidism  Asthma   Chronic leukocytosis          Plan:     Medications:  Continue Neurontin 300 mg TID.  Continue Lithium 600 mg in the AM and 750 mg at HS.  Obtain lithium level 3/18.  Increase Risperdal to 4 mg at HS.  Risperdal Consta 25 mg was initiated on 3/10; plan to increase.  Continue PRNs of Cogentin, Hydroxyzine, Melatonin and Zyprexa.    Discharge to her apartment when stable.  She has outpatient psychiatry, ILS and a home care RN.          PHILL Barahona, CNP  [facility] Psychiatry

## 2022-03-18 LAB
GLUCOSE BLDC GLUCOMTR-MCNC: 126 MG/DL (ref 70–99)
GLUCOSE BLDC GLUCOMTR-MCNC: 96 MG/DL (ref 70–99)
LITHIUM SERPL-SCNC: 0.9 MMOL/L

## 2022-03-18 PROCEDURE — 124N000002 HC R&B MH UMMC

## 2022-03-18 PROCEDURE — 250N000013 HC RX MED GY IP 250 OP 250 PS 637: Performed by: PSYCHIATRY & NEUROLOGY

## 2022-03-18 PROCEDURE — 250N000013 HC RX MED GY IP 250 OP 250 PS 637: Performed by: NURSE PRACTITIONER

## 2022-03-18 PROCEDURE — 36415 COLL VENOUS BLD VENIPUNCTURE: CPT | Performed by: NURSE PRACTITIONER

## 2022-03-18 PROCEDURE — 99232 SBSQ HOSP IP/OBS MODERATE 35: CPT | Performed by: NURSE PRACTITIONER

## 2022-03-18 PROCEDURE — 250N000013 HC RX MED GY IP 250 OP 250 PS 637: Performed by: PHYSICIAN ASSISTANT

## 2022-03-18 PROCEDURE — G0177 OPPS/PHP; TRAIN & EDUC SERV: HCPCS

## 2022-03-18 PROCEDURE — 80178 ASSAY OF LITHIUM: CPT | Performed by: NURSE PRACTITIONER

## 2022-03-18 RX ADMIN — GABAPENTIN 300 MG: 300 CAPSULE ORAL at 08:21

## 2022-03-18 RX ADMIN — RISPERIDONE 4 MG: 4 TABLET ORAL at 21:02

## 2022-03-18 RX ADMIN — METFORMIN HYDROCHLORIDE 1000 MG: 500 TABLET, EXTENDED RELEASE ORAL at 17:15

## 2022-03-18 RX ADMIN — GABAPENTIN 300 MG: 300 CAPSULE ORAL at 21:02

## 2022-03-18 RX ADMIN — FLUTICASONE PROPIONATE 2 SPRAY: 50 SPRAY, METERED NASAL at 08:21

## 2022-03-18 RX ADMIN — LITHIUM CARBONATE 600 MG: 300 TABLET, EXTENDED RELEASE ORAL at 08:20

## 2022-03-18 RX ADMIN — Medication 10 MG: at 21:04

## 2022-03-18 RX ADMIN — LEVOTHYROXINE SODIUM 50 MCG: 25 TABLET ORAL at 08:21

## 2022-03-18 RX ADMIN — LITHIUM CARBONATE 750 MG: 300 TABLET, EXTENDED RELEASE ORAL at 21:01

## 2022-03-18 RX ADMIN — METFORMIN HYDROCHLORIDE 1000 MG: 500 TABLET, EXTENDED RELEASE ORAL at 08:21

## 2022-03-18 RX ADMIN — GABAPENTIN 300 MG: 300 CAPSULE ORAL at 14:16

## 2022-03-18 ASSESSMENT — ACTIVITIES OF DAILY LIVING (ADL)
HYGIENE/GROOMING: INDEPENDENT
DRESS: INDEPENDENT
DRESS: INDEPENDENT
ORAL_HYGIENE: INDEPENDENT
LAUNDRY: WITH SUPERVISION
ORAL_HYGIENE: INDEPENDENT
HYGIENE/GROOMING: INDEPENDENT
LAUNDRY: WITH SUPERVISION

## 2022-03-18 NOTE — PROGRESS NOTES
CLINICAL NUTRITION SERVICES - BRIEF NOTE     Nutrition Prescription    Future/Additional Recommendations:  RDN to follow up if consulted.     REASON FOR ASSESSMENT  Maddy Ortiz is a/an 36 year old female assessed by the dietitian for Provider Order - Does not feel satiated after meals and would like some additional food options.  Has type 2 diabetes and is obese with BMI 43.17.    EVALUATION OF THE PROGRESS TOWARD GOALS   Diet: Consistent Carb (60g/meal)     NEW FINDINGS   Maddy reports she is not feeling satisfied after dinner times. She is not wanting to order HS snack for fear of heightening blood sugars before bedtime 2/2 the snack options.    Labs:  Glucose ranging between 116H-156H (3/16-3/18)       INTERVENTIONS  Double portions of non-starchy veggies (does not include corn, peas, or potatoes).  Chocolate Glucerna at HS snack.    Monitoring/Evaluation  Progress toward goals will be monitored and evaluated per protocol.       Sary Wheat, MPH, RDN, LD  Behavioral Clinical Dietitian  Mental Health Pager (M-F): [phone]  On Call Pager (weekends only): [phone]

## 2022-03-18 NOTE — PLAN OF CARE
OT Progress Note:       03/18/22 1155   Group Therapy Session   Group Attendance attended group session   Time Session Began 0915   Time Session Ended 1015   Total Time patient participated (minutes) 45   Total # Attendees 5   Group Type expressive therapy   Group Topic Covered emotions/expression   Group Session Detail Use of crafts as a means of self expression and awareness   Patient Response/Contribution able to recall/repeat info presented;cooperative with task;expressed understanding of topic;listened actively   Patient Response Detail Noted she was using journal to identify sx's, list coping skills and compensatory techniques to use upon exacerbation of sx's. Accepted feedback well, wrote down notes  and spoke of desire for change.

## 2022-03-18 NOTE — PROGRESS NOTES
"[facility], [place]   Psychiatric Progress Note        Interim History:     The patient's care was discussed with the treatment team during the daily team meeting and staff's chart notes were reviewed.  Pt was documented as sleeping 5 hours during the overnight shift.  She took PRN Tylenol x 1 and PRN Melatonin x 1 yesterday.  Lithium level today was 0.9.  She has been attending some groups.  She has been spending time on the phone, journaling, riding the stationary bike and resting in bed (denies frequent napping).  Pt had made a list of concerning symptoms including irritability, bordom, restlessness, difficulty sleeping, social anxiety, impaired concentration and low interest.  She also speculated that \"I might have some manic symptoms because when I'm on the phone I get talkative.\"  Pt continues to believe that a strong, offensive odor was being emitted from the carpet in her apartment, but also said that when she is not feeling well \"my smell perception is off.  I smell things very strongly here too.\"  Pt continues to speculate on the possibility of \"a birth control idea like an IUD as a selective contraceptive with an enzyme\" released to control the genetic makeup of offspring.   She denies side effects from meds, other than increased appetite.  She reports not feeling satiated after meals and asked for double portions, but given her type 2 diabetes and obesity, ordered a nutrition consult instead.          Medications:       fluticasone  2 spray Both Nostrils Daily     gabapentin  300 mg Oral TID     levothyroxine  50 mcg Oral QAM AC     lithium ER  600 mg Oral QAM     lithium ER  750 mg Oral At Bedtime     metFORMIN  1,000 mg Oral BID w/meals     risperiDONE  4 mg Oral At Bedtime     risperiDONE microspheres ER  25 mg Intramuscular Q14 Days          Allergies:     Allergies   Allergen Reactions     Dust Mites Shortness Of Breath     Animal Dander      Other reaction(s): *Unknown " "    Metformin Fatigue     Patient is taking at home as of 3/3/22     Mold      Other reaction(s): Runny Nose     Trees           Labs:     Recent Results (from the past 24 hour(s))   Glucose by meter    Collection Time: 03/17/22  4:50 PM   Result Value Ref Range    GLUCOSE BY METER POCT 116 (H) 70 - 99 mg/dL   Lithium level    Collection Time: 03/18/22  7:35 AM   Result Value Ref Range    Lithium 0.9   mmol/L   Glucose by meter    Collection Time: 03/18/22  8:14 AM   Result Value Ref Range    GLUCOSE BY METER POCT 126 (H) 70 - 99 mg/dL          Psychiatric Examination:     /79 (BP Location: Right arm)   Pulse 99   Temp 98.4  F (36.9  C) (Oral)   Resp 16   Ht 1.626 m (5' 4\")   Wt 114.1 kg (251 lb 8 oz)   SpO2 100%   BMI 43.17 kg/m    Weight is 251 lbs 8 oz  Body mass index is 43.17 kg/m .    Orthostatic Vitals  Report      Most Recent      Standing Orthostatic /76 03/15 0759    Standing Orthostatic Pulse (bpm) 118 03/15 0759         Appearance:  alert, dressed in hospital scrubs, fair grooming/hygiene  Attitude:  cooperative  Eye Contact:  minimal  Mood: irritable and anxious  Affect: somewhat blunted  Speech:  clear, coherent and normal prosody  Language: fluent and intact in English  Psychomotor, Gait, Musculoskeletal:  no evidence of tardive dyskinesia, dystonia, or tics  Thought Process: poorly organized but some improvement noted  Associations: some looseness of associations  Thought Content:  no evidence of suicidal ideation or homicidal ideation and denies hallucinations, paranoid/delusional thought content is present  Insight:  limited, but improving   Judgement:  limited, but improving   Oriented to:  time, date, person, and place  Attention Span and Concentration: limited  Recent and Remote Memory:  fair  Fund of Knowledge: average    Clinical Global Impressions  First:  Considering your total clinical experience with this particular patient population, how severe are the patient's " symptoms at this time?: 7 (03/04/22 1628)  Compared to the patient's condition at the START of treatment, this patient's condition is: 4 (03/04/22 1628)  Most recent:  Considering your total clinical experience with this particular patient population, how severe are the patient's symptoms at this time?: 5 (03/16/22 0833)  Compared to the patient's condition at the START of treatment, this patient's condition is: 3 (03/16/22 0833)           Precautions:     Behavioral Orders   Procedures     Code 1 - Restrict to Unit     Routine Programming     As clinically indicated     Status 15     Every 15 minutes.          Diagnoses:     Schizoaffective disorder, bipolar type, with multiple episodes, currently in acute episode with mixed mood state and active psychosis symptoms  Generalized anxiety disorder   Diabetes mellitus, type 2  Hypothyroidism  Asthma   Chronic leukocytosis          Plan:     Medications:  Continue Neurontin 300 mg TID.  Continue Lithium 600 mg in the AM and 750 mg at HS (level 0.9).  Continue Risperdal 4 mg at HS.  Risperdal Consta 25 mg was initiated on 3/10; plan to increase.  Continue PRNs of Cogentin, Hydroxyzine, Melatonin and Zyprexa.    Discharge to her apartment when stable.  She has outpatient psychiatry, ILS and a home care RN.          PHILL Braahona, CNP  [facility] Psychiatry

## 2022-03-18 NOTE — PROGRESS NOTES
Pt appears to be sleeping 5 hrs. Pt on status 15 min checks.Pt woke up once for snacks.No other concerns. Will continue to monitor.

## 2022-03-18 NOTE — PLAN OF CARE
Post round meeting with team @9:30 am updates: Team discussed discharge planning. PT remains symptomatic . Pt insight to mental health symptom have increased     Current Symptoms include the following:   Engages in delusional thinking and paranoia      Addressed patient needs/concerns:   Pt did not note any needs/concerns at this time     Discharge Plan or Goal   Plan  Discharge home with CADI Waiver services. Pt would benefit from rep payee and additional cadi waiver services. PT would benefit from ACT team however has declined and would like to continue with CADI Waiver services.      CADI CLOSES 4/3/22    Care Team   CADI Waiver CM- Shira MedinaLkxfsgb[place]- [phone]   Therapist- Callie Manley LP-[phone]  Psychiatrist-Shirley GoodenNP- [facility]-   Financial Worker- Elvira Bae- [facility]- [phone]  Outpatient DBT Group through [facility])        Barriers to Discharge   Ongoing stabilization     Referral Status  No referrals needed at this time     Legal Status  Pt is voluntary

## 2022-03-18 NOTE — PLAN OF CARE
"Goal Outcome Evaluation:    Plan of Care Reviewed With: patient      RN Note:    Patient presents with Restricted affect and  calm  mood. Patient was calm and cooperative during this shift. At the start of the shift, patient was anxious and repeatedly asking to meet with the provider. She explained \"I want to talk with her about the symptoms I am experiencing\" and proceeded to list several symptoms associated with sofía. Patient denies  SI, SIB, HI, and AVH. Patient denies  pain. Patient continues to report social anxiety, but has been making attempts to participate in groups and interact with peers in the lounge. Patient was observed sleeping in between groups and meals. Patient  did attend groups. Patient is med-compliant. Patient reports loose stool x1 this shift. Patient also reports feeling \"extra tired\" this shift and believes it is due to an increase in HS risperdal. Patient blood sugar prior to breakfast was 126. Patient also explained she misses her daughter and hopes to return home soon. Patient agrees with plan of care set in place at this time.         /79 (BP Location: Right arm)   Pulse 99   Temp 98.4  F (36.9  C) (Oral)   Resp 16   Ht 1.626 m (5' 4\")   Wt 114.1 kg (251 lb 8 oz)   SpO2 100%   BMI 43.17 kg/m               "

## 2022-03-18 NOTE — PLAN OF CARE
OT PROGRESS NOTE:       03/18/22 1405   Group Therapy Session   Group Attendance attended group session   Time Session Began 1300   Time Session Ended 1400   Total Time patient participated (minutes) 40   Total # Attendees 4   Group Type life skill;task skill   Group Topic Covered balanced lifestyle;coping skills/lifestyle management   Group Session Detail Practiced general stretching, massage and progressive relaxation exercises for improved self management and relaxation.   Patient Response/Contribution cooperative with task;listened actively   Patient Response Detail Practiced general stretching, self massage and progressive relaxation exercises in group environment. Left a bit early.

## 2022-03-18 NOTE — PROGRESS NOTES
03/17/22 2100   Group Therapy Session   Time Session Began 2005   Time Session Ended 2050   Total # Attendees 5   Group Type expressive therapy   Group Topic Covered balanced lifestyle;relaxation techniques;self-care activities     Music Therapy Group note    Clinical Hours in session: 0.5    Number of patients in group: 5    Scope of service: psychodynamic     Intervention: Evening Relaxation     Goal of group: anxiety reduction     Patient response/reaction to treatment intervention(s): Cooperatively engaged in Evening Music Relaxation group designed to decrease anxiety.  Calm affect, appropriately engaged in session, responding well to the music.      Maddy shared how she was making efforts to ground herself, and not engage in things or topics that activate or depress her (relationships, she mentioned).      She relaxed to the music well, and left early to get ready for bed.    28 minutes participation     GERMAN Vo

## 2022-03-19 LAB
GLUCOSE BLDC GLUCOMTR-MCNC: 132 MG/DL (ref 70–99)
GLUCOSE BLDC GLUCOMTR-MCNC: 202 MG/DL (ref 70–99)

## 2022-03-19 PROCEDURE — 124N000002 HC R&B MH UMMC

## 2022-03-19 PROCEDURE — 250N000013 HC RX MED GY IP 250 OP 250 PS 637: Performed by: PHYSICIAN ASSISTANT

## 2022-03-19 PROCEDURE — 250N000013 HC RX MED GY IP 250 OP 250 PS 637: Performed by: PSYCHIATRY & NEUROLOGY

## 2022-03-19 PROCEDURE — 250N000013 HC RX MED GY IP 250 OP 250 PS 637: Performed by: NURSE PRACTITIONER

## 2022-03-19 RX ADMIN — OLANZAPINE 10 MG: 10 TABLET, FILM COATED ORAL at 06:54

## 2022-03-19 RX ADMIN — GABAPENTIN 300 MG: 300 CAPSULE ORAL at 08:13

## 2022-03-19 RX ADMIN — METFORMIN HYDROCHLORIDE 1000 MG: 500 TABLET, EXTENDED RELEASE ORAL at 08:13

## 2022-03-19 RX ADMIN — ACETAMINOPHEN 650 MG: 325 TABLET, FILM COATED ORAL at 08:16

## 2022-03-19 RX ADMIN — LITHIUM CARBONATE 600 MG: 300 TABLET, EXTENDED RELEASE ORAL at 08:13

## 2022-03-19 RX ADMIN — RISPERIDONE 4 MG: 4 TABLET ORAL at 21:24

## 2022-03-19 RX ADMIN — FLUTICASONE PROPIONATE 2 SPRAY: 50 SPRAY, METERED NASAL at 08:13

## 2022-03-19 RX ADMIN — LITHIUM CARBONATE 750 MG: 300 TABLET, EXTENDED RELEASE ORAL at 21:24

## 2022-03-19 RX ADMIN — LEVOTHYROXINE SODIUM 50 MCG: 25 TABLET ORAL at 06:54

## 2022-03-19 RX ADMIN — METFORMIN HYDROCHLORIDE 1000 MG: 500 TABLET, EXTENDED RELEASE ORAL at 18:26

## 2022-03-19 RX ADMIN — GABAPENTIN 300 MG: 300 CAPSULE ORAL at 14:16

## 2022-03-19 RX ADMIN — ACETAMINOPHEN 650 MG: 325 TABLET, FILM COATED ORAL at 18:50

## 2022-03-19 RX ADMIN — GABAPENTIN 300 MG: 300 CAPSULE ORAL at 20:37

## 2022-03-19 ASSESSMENT — ACTIVITIES OF DAILY LIVING (ADL)
DRESS: INDEPENDENT
ORAL_HYGIENE: INDEPENDENT
HYGIENE/GROOMING: INDEPENDENT
ORAL_HYGIENE: INDEPENDENT
LAUNDRY: WITH SUPERVISION
LAUNDRY: WITH SUPERVISION
HYGIENE/GROOMING: INDEPENDENT
DRESS: INDEPENDENT

## 2022-03-19 NOTE — PROGRESS NOTES
"NOC Shift Report    Pt  in bed at beginning of shift, breathing quiet and unlabored. Pt comes out of her room to the lounge, pt is visualized napping in the lounge, encouraged to go sleep in the room, pt is provided with sanitary pads per request and goes back to her room. Pt declined having any concerns.Pt slept 5.25 hours. Will continue to monitor.    No pt complaints or concerns at this time.     No PRNs given. Will continue to monitor.     No Precautions    Addendum; 0654; Pt reports feeling \"Angry\" and feels like she's having \"a confrontation with police officers\" to writer, denies any SI/SIB AH or VH. PRN Zyprexa offered, pt takes meds without any issues, Morning scheduled Levothyroxine administered at this tie as well.  "

## 2022-03-19 NOTE — PLAN OF CARE
"Goal Outcome Evaluation:    Plan of Care Reviewed With: patient      RN Note:    Patient presents with Blunted/Flat affect and  calm  mood. She was calm and cooperative during all interactions. Patient appeared lethargic and reports feeling \"groggy and sedated\" during this shift. Patient reports after taking PRN Zyprexa at 0654 she feels \"over-sedated.\" Patient reports \"I feel like I have times where I get talkative and have lots of energy, then after taking medications I am too tired to even move.\" Patient also reports having difficulty staying asleep overnight. Patient thought process was more linear and organized during this check in, not as hyperverbal or tangential speech.    Patient denies  SI, SIB, HI, and AVH. Patient endorsed lower back pain 5/10 that was alleviated with PRN Tylenol (see below). Patient watching movies or resting in bed this shift. She remains withdrawn to herself during times in the lounge. Patient is med-compliant.    PRNs given this shift:    Tylenol 650 mg - 0816 for low back pain 5/10 - reported effective    /84 (BP Location: Right arm, Patient Position: Sitting, Cuff Size: Adult Large)   Pulse 86   Temp 97.5  F (36.4  C) (Oral)   Resp 18   Ht 1.626 m (5' 4\")   Wt 114.1 kg (251 lb 8 oz)   SpO2 97%   BMI 43.17 kg/m                 "

## 2022-03-19 NOTE — PLAN OF CARE
"Pt attended a self reflection OT discussion.  Participants were guided through discussion questions that reflected on the their challenges, lessons, mood, gratitude, and what they wanted to remember about this day/this period of time.    Pt was an active participant, tangential, hyperverbal at times.  Pt talked about how she is stressed by her friend Jay Jay, and doesn't know if they should be friends anymore.  Pt reports she has been journaling about her new invention, and will share her idea with her gynocologist when she sees her next.  Regarding progress, pt reports she has been \"getting help with my mental instability, I've been talking to my dad a lot about it, and journaling\".         03/18/22 2134   Group Therapy Session   Group Attendance attended group session   Time Session Began 2000   Time Session Ended 2050   Total Time patient participated (minutes) 50   Total # Attendees 2   Group Type psychoeducation   Group Topic Covered coping skills/lifestyle management;self-care activities   Group Session Detail OT discussion group   Patient Response/Contribution cooperative with task      03/18/22 2134   Group Therapy Session   Group Attendance attended group session   Time Session Began 2000   Time Session Ended 2050   Total Time patient participated (minutes) 50   Total # Attendees 2   Group Type psychoeducation   Group Topic Covered coping skills/lifestyle management;self-care activities   Group Session Detail OT discussion group   Patient Response/Contribution cooperative with task     "

## 2022-03-19 NOTE — PLAN OF CARE
Problem: Behavioral Health Plan of Care  Goal: Plan of Care Review  Outcome: Ongoing, Progressing  Flowsheets (Taken 3/18/2022 1800)  Plan of Care Reviewed With: patient  Patient Agreement with Plan of Care: agrees   Goal Outcome Evaluation:    Plan of Care Reviewed With: patient   Patient is alert and oriented x 4, she is independent of cares, and all Adls. Patient has been calm during this shift, she had a flat affect. Patient said to the writer that she will like to talk about relationships during her therapy session. Patient attended a self reflection OT discussion, she was cooperative, appropriate and attentive. Patient denies pain during this shift, she states that her depression is better and not that much anxious. Patient denies SI/SIB/HI and AVH. Patient's meal intake was adequate, blood sugar was 96 before meals, vs's, medication compliant and no stated side effects.

## 2022-03-20 LAB
GLUCOSE BLDC GLUCOMTR-MCNC: 128 MG/DL (ref 70–99)
GLUCOSE BLDC GLUCOMTR-MCNC: 136 MG/DL (ref 70–99)
GLUCOSE BLDC GLUCOMTR-MCNC: 184 MG/DL (ref 70–99)

## 2022-03-20 PROCEDURE — 250N000013 HC RX MED GY IP 250 OP 250 PS 637: Performed by: PSYCHIATRY & NEUROLOGY

## 2022-03-20 PROCEDURE — 124N000002 HC R&B MH UMMC

## 2022-03-20 PROCEDURE — 250N000013 HC RX MED GY IP 250 OP 250 PS 637: Performed by: NURSE PRACTITIONER

## 2022-03-20 PROCEDURE — 250N000013 HC RX MED GY IP 250 OP 250 PS 637: Performed by: PHYSICIAN ASSISTANT

## 2022-03-20 RX ORDER — OLANZAPINE 2.5 MG/1
2.5-5 TABLET, FILM COATED ORAL 3 TIMES DAILY PRN
Status: DISCONTINUED | OUTPATIENT
Start: 2022-03-20 | End: 2022-03-30 | Stop reason: HOSPADM

## 2022-03-20 RX ORDER — OLANZAPINE 10 MG/2ML
10 INJECTION, POWDER, FOR SOLUTION INTRAMUSCULAR 3 TIMES DAILY PRN
Status: DISCONTINUED | OUTPATIENT
Start: 2022-03-20 | End: 2022-03-30 | Stop reason: HOSPADM

## 2022-03-20 RX ADMIN — Medication 10 MG: at 00:16

## 2022-03-20 RX ADMIN — GABAPENTIN 300 MG: 300 CAPSULE ORAL at 08:13

## 2022-03-20 RX ADMIN — GABAPENTIN 300 MG: 300 CAPSULE ORAL at 19:50

## 2022-03-20 RX ADMIN — LITHIUM CARBONATE 750 MG: 300 TABLET, EXTENDED RELEASE ORAL at 21:01

## 2022-03-20 RX ADMIN — OLANZAPINE 5 MG: 2.5 TABLET, FILM COATED ORAL at 22:02

## 2022-03-20 RX ADMIN — RISPERIDONE 4 MG: 4 TABLET ORAL at 21:01

## 2022-03-20 RX ADMIN — METFORMIN HYDROCHLORIDE 1000 MG: 500 TABLET, EXTENDED RELEASE ORAL at 18:38

## 2022-03-20 RX ADMIN — HYDROXYZINE HYDROCHLORIDE 25 MG: 25 TABLET, FILM COATED ORAL at 00:16

## 2022-03-20 RX ADMIN — LEVOTHYROXINE SODIUM 50 MCG: 25 TABLET ORAL at 08:13

## 2022-03-20 RX ADMIN — LITHIUM CARBONATE 600 MG: 300 TABLET, EXTENDED RELEASE ORAL at 08:13

## 2022-03-20 RX ADMIN — OLANZAPINE 2.5 MG: 2.5 TABLET, FILM COATED ORAL at 12:28

## 2022-03-20 RX ADMIN — GABAPENTIN 300 MG: 300 CAPSULE ORAL at 14:00

## 2022-03-20 RX ADMIN — FLUTICASONE PROPIONATE 2 SPRAY: 50 SPRAY, METERED NASAL at 08:13

## 2022-03-20 RX ADMIN — METFORMIN HYDROCHLORIDE 1000 MG: 500 TABLET, EXTENDED RELEASE ORAL at 08:13

## 2022-03-20 ASSESSMENT — ACTIVITIES OF DAILY LIVING (ADL)
HYGIENE/GROOMING: INDEPENDENT
HYGIENE/GROOMING: INDEPENDENT
LAUNDRY: WITH SUPERVISION
ORAL_HYGIENE: INDEPENDENT
DRESS: INDEPENDENT
LAUNDRY: WITH SUPERVISION
DRESS: INDEPENDENT
ORAL_HYGIENE: INDEPENDENT

## 2022-03-20 NOTE — PLAN OF CARE
Problem: Sleep Disturbance (Psychotic Signs/Symptoms)  Goal: Improved Sleep (Psychotic Signs/Symptoms)  Outcome: Ongoing, Progressing   Goal Outcome Evaluation:     At the start of the shift, Pt was awake. She was given melatonin and Vistaril, both of which helped her fall asleep. Pt is not on any precautions, but the fifteen-minute safety timeframe is running smoothly. She slept for 5.25 hours and had a normal breathing rhythm. BG at 0630 result is 136.

## 2022-03-20 NOTE — PLAN OF CARE
"Goal Outcome Evaluation:    Plan of Care Reviewed With: patient      RN Note:    Patient presents with Blunted/Flat affect and  calm  mood. Patient was calm, cooperative, and pleasant during this shift. She took a shower this morning and has been independent with all ADLs. Patient denies  SI, SIB, HI, and AVH. Patient denies pain. Patient was observed doing journaling and watching movies in the lounge. She was social with a few peers. Patient is med-compliant. No medication side effects observed or endorsed by patient today.    Patient reports an increase in anxiety and \"my energy levels are rising, i'm having intrusive thoughts, and feel manic.\" Patient is concerned about sedation during waking hours when she takes Zyprexa and requested a lower dose. Patient reports Zyprexa is most effective in relieving these symptoms. Patient received one dose of Zyprexa 2.5 mg PRN at 1228. Approx 1.5 hours later, patient was observed to be asleep in the lounge. Patient reports PRN was effective in relieving symptoms, but still feels \"groggy.\" Nursing will continue to monitor.      /84 (BP Location: Right arm, Patient Position: Sitting, Cuff Size: Adult Large)   Pulse 92   Temp 98  F (36.7  C) (Oral)   Resp 16   Ht 1.626 m (5' 4\")   Wt 113.9 kg (251 lb)   SpO2 98%   BMI 43.08 kg/m    "

## 2022-03-20 NOTE — PLAN OF CARE
Problem: Anxiety  Goal: Anxiety Reduction or Resolution  Outcome: Ongoing, Not Progressing   Nursing Assessment    Recent Vitals: B/P:126/85  T:98.3  P:95     General Shift Summary  Withdrawn most of shift. Pt reports feeling bursts of energy. Pt feeling like they are out of control and cannot focus. Pt says they are unsure if this is due to psychosis. Pt stated that they wanted something at night time to help them feel calm. Writer discussed pt available PRN's should they need them later in the night.     Pt complaining of back pain 6/10, given Tylenol which pt reported being effective. Pt laying on back in bed to reduce pain. Pt fell asleep around 1915. Writer woke for meds and pt up briefly for snack but pt otherwise sleeping the rest of the shift.     Pt did not appear to be responding. No delusional statements made to writer.     Patient is medication compliant and reported no side effects. PRN medications other than Tylenol given.     Hygiene and appetite are appropriate.     Maynor Amado RN

## 2022-03-21 LAB
GLUCOSE BLDC GLUCOMTR-MCNC: 134 MG/DL (ref 70–99)
GLUCOSE BLDC GLUCOMTR-MCNC: 166 MG/DL (ref 70–99)

## 2022-03-21 PROCEDURE — 124N000002 HC R&B MH UMMC

## 2022-03-21 PROCEDURE — 250N000013 HC RX MED GY IP 250 OP 250 PS 637: Performed by: NURSE PRACTITIONER

## 2022-03-21 PROCEDURE — 250N000013 HC RX MED GY IP 250 OP 250 PS 637: Performed by: PSYCHIATRY & NEUROLOGY

## 2022-03-21 PROCEDURE — H2032 ACTIVITY THERAPY, PER 15 MIN: HCPCS

## 2022-03-21 PROCEDURE — 250N000013 HC RX MED GY IP 250 OP 250 PS 637: Performed by: PHYSICIAN ASSISTANT

## 2022-03-21 PROCEDURE — 99232 SBSQ HOSP IP/OBS MODERATE 35: CPT | Performed by: NURSE PRACTITIONER

## 2022-03-21 RX ORDER — OLANZAPINE 10 MG/1
10 TABLET ORAL EVERY EVENING
Status: DISCONTINUED | OUTPATIENT
Start: 2022-03-21 | End: 2022-03-23

## 2022-03-21 RX ORDER — RISPERIDONE 2 MG/1
2 TABLET ORAL EVERY EVENING
Status: DISCONTINUED | OUTPATIENT
Start: 2022-03-21 | End: 2022-03-23

## 2022-03-21 RX ADMIN — GABAPENTIN 300 MG: 300 CAPSULE ORAL at 19:04

## 2022-03-21 RX ADMIN — METFORMIN HYDROCHLORIDE 1000 MG: 500 TABLET, EXTENDED RELEASE ORAL at 17:58

## 2022-03-21 RX ADMIN — LITHIUM CARBONATE 600 MG: 300 TABLET, EXTENDED RELEASE ORAL at 08:20

## 2022-03-21 RX ADMIN — LITHIUM CARBONATE 750 MG: 300 TABLET, EXTENDED RELEASE ORAL at 21:07

## 2022-03-21 RX ADMIN — ALBUTEROL SULFATE 2 PUFF: 90 AEROSOL, METERED RESPIRATORY (INHALATION) at 18:54

## 2022-03-21 RX ADMIN — OLANZAPINE 10 MG: 10 TABLET, FILM COATED ORAL at 19:04

## 2022-03-21 RX ADMIN — FLUTICASONE PROPIONATE 2 SPRAY: 50 SPRAY, METERED NASAL at 08:21

## 2022-03-21 RX ADMIN — GABAPENTIN 300 MG: 300 CAPSULE ORAL at 14:28

## 2022-03-21 RX ADMIN — GABAPENTIN 300 MG: 300 CAPSULE ORAL at 08:20

## 2022-03-21 RX ADMIN — METFORMIN HYDROCHLORIDE 1000 MG: 500 TABLET, EXTENDED RELEASE ORAL at 08:20

## 2022-03-21 RX ADMIN — LEVOTHYROXINE SODIUM 50 MCG: 25 TABLET ORAL at 08:20

## 2022-03-21 RX ADMIN — OLANZAPINE 2.5 MG: 2.5 TABLET, FILM COATED ORAL at 11:47

## 2022-03-21 RX ADMIN — RISPERIDONE 2 MG: 2 TABLET ORAL at 19:04

## 2022-03-21 NOTE — PLAN OF CARE
Goal Outcome Evaluation:     Pt. Willing to engage in a 1:1 with staff.  Pt. States that she is doing better overall,  not experiencing auditory or visual hallucinations; however the pt. States that she is experiencing some intrusive thoughts that do not make sense.  The pt. Gives an example of  someone on the unit being a spy.  The pt. States that she knows that this person is not a spy but this thought will filter through her mind.  The pt. Received a prn of zyprexa to assist with this.  The pt. States that she does not mind being in the hospital, that she feels comfortable on the unit.  The pt. States that she wants to stay in the hospital until her mental health symptoms are no longer an issue.  Pt. Denies suicidal ideation, self-injurious behavior.  The pt. States that she attended part of a group; the pt. Is also listening to music, and walking in the gonzalez for exercise.  The pt.'s affect is flat and blunted;  however the pt. appears  in no distress.   The pt. Has no questions or requests at this time.
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
## 2022-03-21 NOTE — PLAN OF CARE
Pt had difficulty falling and staying asleep throughout the beginning of the overnight shift. She got up several times to either go to the bathroom, get water or check the time. Eventually, she was able to sleep 6.5 hours. No PRN medications were administered and no other concerns were noted.     Problem: Sleep Disturbance (Psychotic Signs/Symptoms)  Goal: Improved Sleep (Psychotic Signs/Symptoms)  Outcome: Ongoing, Progressing
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
## 2022-03-21 NOTE — CARE PLAN
03/21/22 1236   Team Discussion   Participants Provider- Mary Reeves, NP, Nursing- Macy Lei, RN- Clinical Treatment Coordinator- Lauren MEYER [facility], and OT- Corbin Jaramillo   Progress Pt engaes in delusional thinking and paranoia. She remains disorganized and medication adjustments are being made. She requires ongoing stabilization at this time.   Anticipated length of stay 1-2 weeks   Continued Stay Criteria/Rationale Psychotic symptoms, need to stabilize and symptoms reduced   Medical/Physical 1) Diabetes Type 2-Hgb A1c 8.4 on 1/8/22-Patient does not take home FSBG-Continue PTA Metformin-FSBG BID-Nutrition consult 2) Asthma-Pt denies concerns but has nasal congestion and pharyngitis. -Monitor for worsening asthma symptoms -PTA albuterol inhaler 3) Hernia -CT abdomen shows mandeep umbilical hernia -Evaluated in ED, please see ED provider note for details-Pain mgmt with tylenol-Monitor for worsening 4) Hypothyroidism-TSH 6.22 and T4 1.16 on 3/4/22-Patient inconsistant with medication -Continue home Synthroid 25 mcg 5) Possible UTI-UA 3/3/21 suspicious for UTI-Patient asymptomatic- culture results show mixed urogenital spencer 6) Leukocytosis-Elevated WBC on 3/3 and 3/4 (12.1 and 12.9 respectively)-Possibly related to Lithium use-Repeat CBC 3/5 -IM consulted 7) URI-Nasal congestion and pharyngitis-Strep negative-Influenza pending-Supportive cares   Precautions .   Plan Discharge home with out patient services. Pt has CADI Waiver services. OP providers recommend ACT team but pt does not want ACT team   Rationale for change in precautions or plan No change

## 2022-03-21 NOTE — PLAN OF CARE
03/21/22 1236   Individualization/Patient Specific Goals   Patient Personal Strengths family/social support   Patient Vulnerabilities recent loss   Anxieties, Fears or Concerns None noted   Individualized Care Needs Patient states she wants to return home to her daughter and to feel better.   Patient-Specific Goals (Include Timeframe) Patient states she wants to return home to her daughter and to feel better.   Interprofessional Rounds   Participants advanced practice nurse;;nursing;psychiatrist   Team Discussion   Participants Provider- Mary Reeves NP, Nursing- Macy Lei RN- Clinical Treatment Coordinator- Lauren Landry [facility], and OT- Corbin Jaramillo   Progress Pt engaes in delusional thinking and paranoia. She remains disorganized and medication adjustments are being made. She requires ongoing stabilization at this time.   Anticipated length of stay 1-2 weeks   Continued Stay Criteria/Rationale Psychotic symptoms, need to stabilize and symptoms reduced   Medical/Physical 1) Diabetes Type 2-Hgb A1c 8.4 on 1/8/22-Patient does not take home FSBG-Continue PTA Metformin-FSBG BID-Nutrition consult 2) Asthma-Pt denies concerns but has nasal congestion and pharyngitis. -Monitor for worsening asthma symptoms -PTA albuterol inhaler 3) Hernia -CT abdomen shows mandeep umbilical hernia -Evaluated in ED, please see ED provider note for details-Pain mgmt with tylenol-Monitor for worsening 4) Hypothyroidism-TSH 6.22 and T4 1.16 on 3/4/22-Patient inconsistant with medication -Continue home Synthroid 25 mcg 5) Possible UTI-UA 3/3/21 suspicious for UTI-Patient asymptomatic- culture results show mixed urogenital spencer 6) Leukocytosis-Elevated WBC on 3/3 and 3/4 (12.1 and 12.9 respectively)-Possibly related to Lithium use-Repeat CBC 3/5 -IM consulted 7) URI-Nasal congestion and pharyngitis-Strep negative-Influenza pending-Supportive cares       Plan Discharge home with out patient services. Pt has CADI  Waiver services. OP providers recommend ACT team but pt does not want ACT team   Rationale for change in precautions or plan No change       Anticipated Discharge Disposition home with family     PRECAUTIONS AND SAFETY    Behavioral Orders   Procedures    Code 1 - Restrict to Unit    Routine Programming     As clinically indicated    Status 15     Every 15 minutes.       Safety  Safety WDL: WDL  Patient Location: dining room, hallway, lounge, patient room, own  Observed Behavior: calm  Observed Behavior (Comment): Sitting, resting  Safety Measures: environmental rounds completed, suicide assessment completed, suicide check-in completed  Suicidality: Status 15

## 2022-03-21 NOTE — PROGRESS NOTES
"[facility], [place]   Psychiatric Progress Note        Interim History:     The patient's care was discussed with the treatment team during the daily team meeting and staff's chart notes were reviewed.  Pt was documented as sleeping between 5.25 and 6.5 hours during the weekend overnight shifts.  She has been taking PRNs of Tylenol, Melatonin, Hydroxyzine and Zyprexa.  She informed the weekend RN that Zyprexa is very helpful for anxiety, irritability and hypomanic symptoms but causes her to feel tired.  She asked for a lower PRN dose, so the PRN dose was reduced.  She has been attending groups.  Her mood is \"okay,\" irritable and anxious at times.  She denies feeling depressed.  She denies suicidal ideation.  She denies hallucinations.  She did not make any statements indicative of paranoid/delusional thought content.  She appeared more organized and was able to focus better on the content of the covnersation.   Pt states she also perceives that her concentration is better.  He appetite is high and she asked for Glucerna to be given TID with meals.  Pt asked for an order for family to drop off some items from home, and this was approved.  Pt says that PO Zyprexa is far more beneficial than PO Risperdal and would like to switch.  This is in the process of being implemented.  Pt is due for her weekly COVID-19 test today.            Medications:       fluticasone  2 spray Both Nostrils Daily     gabapentin  300 mg Oral TID     levothyroxine  50 mcg Oral QAM AC     lithium ER  600 mg Oral QAM     lithium ER  750 mg Oral At Bedtime     metFORMIN  1,000 mg Oral BID w/meals     OLANZapine  10 mg Oral QPM     risperiDONE  2 mg Oral QPM     risperiDONE microspheres ER  25 mg Intramuscular Q14 Days          Allergies:     Allergies   Allergen Reactions     Dust Mites Shortness Of Breath     Animal Dander      Other reaction(s): *Unknown     Metformin Fatigue     Patient is taking at home as of " "3/3/22     Mold      Other reaction(s): Runny Nose     Trees           Labs:     Recent Results (from the past 24 hour(s))   Glucose by meter    Collection Time: 03/20/22  5:32 PM   Result Value Ref Range    GLUCOSE BY METER POCT 184 (H) 70 - 99 mg/dL   Glucose by meter    Collection Time: 03/21/22  8:06 AM   Result Value Ref Range    GLUCOSE BY METER POCT 134 (H) 70 - 99 mg/dL          Psychiatric Examination:     /85 (BP Location: Left arm, Patient Position: Sitting, Cuff Size: Adult Large)   Pulse 99   Temp 97  F (36.1  C) (Oral)   Resp 17   Ht 1.626 m (5' 4\")   Wt 113.9 kg (251 lb)   SpO2 97%   BMI 43.08 kg/m    Weight is 251 lbs 0 oz  Body mass index is 43.08 kg/m .    Orthostatic Vitals  Report      Most Recent      Standing Orthostatic /76 03/15 0759    Standing Orthostatic Pulse (bpm) 118 03/15 0759         Appearance:  alert, dressed in hospital scrubs, fair grooming/hygiene  Attitude:  cooperative  Eye Contact:  minimal  Mood: \"okay,\" irritable and anxious at times  Affect:  somewhat blunted  Speech:  clear, coherent and normal prosody, no longer hyperverbal  Language: fluent and intact in English  Psychomotor, Gait, Musculoskeletal:  no evidence of tardive dyskinesia, dystonia, or tics  Thought Process: better organized  Associations: no looseness of associations  Thought Content:  no evidence of suicidal ideation or homicidal ideation and denies hallucinations, did not make statements indicative of paranoid/delusional thought content but cannot rule out  Insight:  limited, but improving   Judgement:  limited, but improving   Oriented to:  time, date, person, and place  Attention Span and Concentration: limited, improving  Recent and Remote Memory:  fair  Fund of Knowledge: average    Clinical Global Impressions  First:  Considering your total clinical experience with this particular patient population, how severe are the patient's symptoms at this time?: 7 (03/04/22 4116)  Compared to " the patient's condition at the START of treatment, this patient's condition is: 4 (03/04/22 1628)  Most recent:  Considering your total clinical experience with this particular patient population, how severe are the patient's symptoms at this time?: 5 (03/16/22 0833)  Compared to the patient's condition at the START of treatment, this patient's condition is: 3 (03/16/22 0833)           Precautions:     Behavioral Orders   Procedures     Code 1 - Restrict to Unit     Routine Programming     As clinically indicated     Status 15     Every 15 minutes.          Diagnoses:     Schizoaffective disorder, bipolar type, with multiple episodes, currently in acute episode with mixed mood state and active psychosis symptoms  Generalized anxiety disorder   Diabetes mellitus, type 2  Hypothyroidism  Asthma   Chronic leukocytosis          Plan:     Medications:  Continue Neurontin 300 mg TID.  Continue Lithium 600 mg in the AM and 750 mg at HS (level 0.9).  Begin cross titration from PO Risperdal to PO Zyprexa.  Risperdal Consta 25 mg was initiated on 3/10.  She is interested in Zyprexa Relprevv but aware that there is not on the hospital formulary, nor is it available through most agencies in the community.  Continue PRNs of Cogentin, Hydroxyzine, Melatonin and Zyprexa.    Discharge to her apartment when stable.  She has outpatient psychiatry, ILS and a home care RN.          PHILL Barahona, CNP  [facility] Psychiatry

## 2022-03-21 NOTE — PLAN OF CARE
"   03/20/22 2100   Group Therapy Session   Group Attendance attended group session   Time Session Began 2000   Time Session Ended 2100   Total Time patient participated (minutes) 45   Total # Attendees 5   Group Type expressive therapy  (art therapy)   Group Topic Covered emotions/expression   Group Session Detail self-compassion   Patient Response/Contribution cooperative with task;organized;discussed personal experience with topic   Patient Response Detail Maddy presented as calm and pleasant. She seemed quiet at first, but then engaged in group discussion about self-compassion and spoke at length about her thoughts on the subject. Her speech became slightly tangential. She participated in making art about self-compassion. She appeared calm and focused while painting an image of a red irwin. She shared her art with the group an talked about the irwin representing \"empowerment.\" She talked about wanting to stand up for herself and others. She interacted appropriately with peers and was a positive participant.    Goal Outcome Evaluation:                      "

## 2022-03-21 NOTE — PLAN OF CARE
Future Appointments   Date Time Provider Department Center   3/28/2022 10:45 AM [facility]   4/5/2022  8:00 AM Eli Alanis MD [facility]   4/14/2022 10:30 AM France Kingsley RD [facility]   5/6/2022 10:30 AM Rudy Shin MD [facility]   6/10/2022  8:00 AM Krishna Olivia MD [facility] CS     Tasks Complete:    Post round meeting with team @9:30 am updates: Team discussed pt symptoms and discharge planning. PT's Cadi waiver will close 4/3. Pt remains symptomatic.     Current Symptoms include the following: Pt engages in paranoia, delusional thinking, and  Disorganized thoughts.     Addressed patient needs/concerns:   PT did not express needs/concerns at this time    Discharge Plan or Goal   Plan  Discharge home with CADI Waiver services. Pt would benefit from rep payee and additional cadi waiver services. PT would benefit from ACT team however has declined and would like to continue with CADI Waiver services.      CADI CLOSES 4/3/22     Care Team     CADI Waiver CM- [place]ira [place]- [phone]     Therapist- Callie Manley LP-[phone]    Psychiatrist-Shirley Gooden NP- [facility]-     Financial Worker- Elvira Bae- [facility]- [phone]    Outpatient DBT Group through [facility])        Barriers to Discharge   Ongoing stabilization     Referral Status  No referrals needed at this time     Legal Status  Pt is voluntary

## 2022-03-22 LAB
GLUCOSE BLDC GLUCOMTR-MCNC: 155 MG/DL (ref 70–99)
GLUCOSE BLDC GLUCOMTR-MCNC: 166 MG/DL (ref 70–99)
SARS-COV-2 RNA RESP QL NAA+PROBE: NEGATIVE

## 2022-03-22 PROCEDURE — 87635 SARS-COV-2 COVID-19 AMP PRB: CPT | Performed by: NURSE PRACTITIONER

## 2022-03-22 PROCEDURE — 250N000013 HC RX MED GY IP 250 OP 250 PS 637: Performed by: NURSE PRACTITIONER

## 2022-03-22 PROCEDURE — 250N000013 HC RX MED GY IP 250 OP 250 PS 637: Performed by: PHYSICIAN ASSISTANT

## 2022-03-22 PROCEDURE — 250N000013 HC RX MED GY IP 250 OP 250 PS 637: Performed by: PSYCHIATRY & NEUROLOGY

## 2022-03-22 PROCEDURE — H2032 ACTIVITY THERAPY, PER 15 MIN: HCPCS

## 2022-03-22 PROCEDURE — 99232 SBSQ HOSP IP/OBS MODERATE 35: CPT | Performed by: NURSE PRACTITIONER

## 2022-03-22 PROCEDURE — 124N000002 HC R&B MH UMMC

## 2022-03-22 RX ADMIN — RISPERIDONE 2 MG: 2 TABLET ORAL at 21:11

## 2022-03-22 RX ADMIN — METFORMIN HYDROCHLORIDE 1000 MG: 500 TABLET, EXTENDED RELEASE ORAL at 18:43

## 2022-03-22 RX ADMIN — OLANZAPINE 10 MG: 10 TABLET, FILM COATED ORAL at 21:11

## 2022-03-22 RX ADMIN — ALBUTEROL SULFATE 2 PUFF: 90 AEROSOL, METERED RESPIRATORY (INHALATION) at 08:40

## 2022-03-22 RX ADMIN — Medication 10 MG: at 22:58

## 2022-03-22 RX ADMIN — GABAPENTIN 300 MG: 300 CAPSULE ORAL at 21:11

## 2022-03-22 RX ADMIN — GABAPENTIN 300 MG: 300 CAPSULE ORAL at 08:17

## 2022-03-22 RX ADMIN — LITHIUM CARBONATE 600 MG: 300 TABLET, EXTENDED RELEASE ORAL at 08:17

## 2022-03-22 RX ADMIN — LEVOTHYROXINE SODIUM 50 MCG: 25 TABLET ORAL at 08:17

## 2022-03-22 RX ADMIN — FLUTICASONE PROPIONATE 2 SPRAY: 50 SPRAY, METERED NASAL at 08:17

## 2022-03-22 RX ADMIN — Medication 10 MG: at 00:45

## 2022-03-22 RX ADMIN — METFORMIN HYDROCHLORIDE 1000 MG: 500 TABLET, EXTENDED RELEASE ORAL at 08:17

## 2022-03-22 RX ADMIN — LITHIUM CARBONATE 750 MG: 300 TABLET, EXTENDED RELEASE ORAL at 21:11

## 2022-03-22 RX ADMIN — GABAPENTIN 300 MG: 300 CAPSULE ORAL at 14:08

## 2022-03-22 ASSESSMENT — ACTIVITIES OF DAILY LIVING (ADL)
ORAL_HYGIENE: INDEPENDENT
LAUNDRY: WITH SUPERVISION
DRESS: INDEPENDENT
LAUNDRY: WITH SUPERVISION
ORAL_HYGIENE: INDEPENDENT
DRESS: INDEPENDENT
HYGIENE/GROOMING: INDEPENDENT
HYGIENE/GROOMING: INDEPENDENT

## 2022-03-22 NOTE — PROGRESS NOTES
"[facility], [place]   Psychiatric Progress Note        Interim History:     The patient's care was discussed with the treatment team during the daily team meeting and staff's chart notes were reviewed.  Pt was documented as sleeping 5.75 hours during the overnight shift.  She attended 1 group yesterday.  She took PRN Zyprexa 2.5 mg x 1, PRN Albuterol x 1 and PRN Melatonin during the overnight shift.  Staff report she made some paranoid statements about spies.  Pt reports that her mood has been somewhat anxious and irritable.  She said, \"I don't feel supported, I felling like I'm being experimented and tested on.\"  Pt does not believe she has delusional thoughts but does agree with the assessment that she has disorganized thoughts and some manic symptoms.  She was more locquacious and tangential today, speaking mostly about conflict with one of my her friends with whom she had a phone conversation yesterday.  States she talked to her  regarding her carpet and needs to check her e-mail for a further response.  This was approved.  Pt also asked for approval to wear her own clothing including bra, and this was approved.  Agrees with plan to continue cross titration from Risperdal to Zyprexa.            Medications:       fluticasone  2 spray Both Nostrils Daily     gabapentin  300 mg Oral TID     levothyroxine  50 mcg Oral QAM AC     lithium ER  600 mg Oral QAM     lithium ER  750 mg Oral At Bedtime     metFORMIN  1,000 mg Oral BID w/meals     OLANZapine  10 mg Oral QPM     risperiDONE  2 mg Oral QPM     risperiDONE microspheres ER  25 mg Intramuscular Q14 Days          Allergies:     Allergies   Allergen Reactions     Dust Mites Shortness Of Breath     Animal Dander      Other reaction(s): *Unknown     Metformin Fatigue     Patient is taking at home as of 3/3/22     Mold      Other reaction(s): Runny Nose     Trees           Labs:     Recent Results (from the past 24 " "hour(s))   Glucose by meter    Collection Time: 03/21/22  5:45 PM   Result Value Ref Range    GLUCOSE BY METER POCT 166 (H) 70 - 99 mg/dL   Glucose by meter    Collection Time: 03/22/22  8:00 AM   Result Value Ref Range    GLUCOSE BY METER POCT 155 (H) 70 - 99 mg/dL   Asymptomatic COVID-19 Virus (Coronavirus) by PCR Nasopharyngeal    Collection Time: 03/22/22 10:50 AM    Specimen: Nasopharyngeal; Swab   Result Value Ref Range    SARS CoV2 PCR Negative Negative          Psychiatric Examination:     /85 (BP Location: Left arm, Patient Position: Sitting)   Pulse 105   Temp 97.5  F (36.4  C) (Oral)   Resp 17   Ht 1.626 m (5' 4\")   Wt 114.3 kg (251 lb 14.4 oz)   SpO2 96%   BMI 43.24 kg/m    Weight is 251 lbs 14.4 oz  Body mass index is 43.24 kg/m .    Orthostatic Vitals  Report      Most Recent      Standing Orthostatic /76 03/15 0759    Standing Orthostatic Pulse (bpm) 118 03/15 0759         Appearance:  alert, dressed in hospital scrubs, fair grooming/hygiene  Attitude:  cooperative  Eye Contact:  minimal  Mood: \"okay,\" irritable and anxious at times  Affect:  somewhat blunted  Speech:  clear, coherent and normal prosody, somewhat hyperverbal  Language: fluent and intact in English  Psychomotor, Gait, Musculoskeletal:  no evidence of tardive dyskinesia, dystonia, or tics  Thought Process:  better organized, compared to admission  Associations: no looseness of associations  Thought Content:  no evidence of suicidal ideation or homicidal ideation and denies hallucinations, paranoid/delusional thought content is present  Insight:  limited, but improving   Judgement:  limited, but improving   Oriented to:  time, date, person, and place  Attention Span and Concentration:  limited, improving  Recent and Remote Memory:  fair  Fund of Knowledge: average      Clinical Global Impressions  First:  Considering your total clinical experience with this particular patient population, how severe are the patient's " symptoms at this time?: 7 (03/04/22 1628)  Compared to the patient's condition at the START of treatment, this patient's condition is: 4 (03/04/22 1628)  Most recent:  Considering your total clinical experience with this particular patient population, how severe are the patient's symptoms at this time?: 5 (03/16/22 0833)  Compared to the patient's condition at the START of treatment, this patient's condition is: 3 (03/16/22 0833)           Precautions:     Behavioral Orders   Procedures     Code 1 - Restrict to Unit     Routine Programming     As clinically indicated     Status 15     Every 15 minutes.          Diagnoses:     Schizoaffective disorder, bipolar type, with multiple episodes, currently in acute episode with mixed mood state and active psychosis symptoms  Generalized anxiety disorder   Diabetes mellitus, type 2  Hypothyroidism  Asthma   Chronic leukocytosis          Plan:     Medications:  Continue Neurontin 300 mg TID.  Continue Lithium 600 mg in the AM and 750 mg at HS (level 0.9).  Begin cross titration from PO Risperdal to PO Zyprexa.  Risperdal Consta 25 mg was initiated on 3/10.  She is interested in Zyprexa Relprevv but aware that this is not on the hospital formulary, nor is it available through most agencies in the community.  Continue PRNs of Cogentin, Hydroxyzine, Melatonin and Zyprexa.    Discharge to her apartment when stable.  She has outpatient psychiatry, ILS and a home care RN.          PHILL Barahona, CNP  [facility] Psychiatry

## 2022-03-22 NOTE — PLAN OF CARE
"  Pt was irritable this morning when talking about the clothing available at the hospital. \"Insurance should cover some more protective clothing because some people have PTSD and they feel exposed, like they just want to stay in their room all the time.\" Pt was speaking loudly and rapidly to this RN in front of other [place] [facility] [place] Provider ok'ed access to pt's underwire bra and this RN gave her a yellow sweatshirt from the unit supply, pt became much more calm and said she felt more comfortable. Affect was labile. Mood was anxious, tense at times. After getting her morning medications pt was more calm, she showered and took a nap after lunch. No SI/SIB noted or observed. Will continue to monitor.  "

## 2022-03-22 NOTE — PLAN OF CARE
"Appointments:   3/28/2022 10:45 AM [facility]   4/5/2022  8:00 AM Eli Alanis MD [facility]   4/14/2022 10:30 AM France Kingsley RD [facility]   5/6/2022 10:30 AM Rudy Shin MD [facility]   6/10/2022  8:00 AM Krishna Olivia MD [facility] CS         Tasks Complete:      [facility] emailed CADI CM to confirm if CADI CM plans to close waiver. If pt requires ongoing stabilization past the 30 day whitney then [facility] and CADI CM will work proactively to schedule MNCHOICE assessment so that pt will discharge with an open waiver if it should close. [facility] requested for CADI CM to keep waiver open incase pt requires ongoing stabilization past 3/4/22  - update: CADI  confirmed that pt waiver will remain open after 30 days and pt services will remain in place if pt's admission is longer than 30 days.  In addition, pt left  for CADI CM Requesting to move closer to her daughters school. CADI CM will call her back sometime today or tomorrow.    [facility] met with pt per pt's request.   PT stated her concerns regarding her carpet and landlord. PT requested assistance in calling her landlord and that she was supposed to receive an email from the landlord but has yet to receive an email.  [facility] discussed pt's symptoms and asked pt if she thought her thought process surrounding the carpet was symptomatic. PT stated that she stopped taking her medications and the smell of the carpet started to increase. [facility] assisted pt in calling her landlord. The landlord stated that the patient's email is anticipated to receive the resolution from the maintenance to determine the next steps surrounding her request to \" fix the carpet\". Pt responded and stated that she would like to cancel this request due to her perception not being reality-based. Pt stated that she would like to take care of the carpet independently. The landlord confirmed that the pt is not in trouble and they will support pt as best as possible. [facility] " discussed the benefits of working with an ACT team. The pt responded with ambivalence surrounding working with the ACT team. The pt stated that she wants to wait for her daughter to leave the nest before engaging with the ACT team. CTC noted that the ACT team would assist with managing her symptoms daily to reduce symptoms and hospitalizations. PT stated she enjoys going to her outpatient appointments, and it gives her a purpose. Pt stated that she would think about it and speak with CTC further about the ACT team tomorrow.   - CTC provided update to CM      Post round meeting with team @9:30 am updates: Team discussed discharge planning. Pt is currently still symptomatic.     Current Symptoms include the following: Pt engages in paranoia, delusional thinking, and  Disorganized thoughts.     Addressed patient needs/concerns:  PT did not express needs/concerns at this time    Discharge Plan or Goal   Plan  Discharge home with CADI Waiver services. Pt would benefit from rep payee and additional cadi waiver services. PT would benefit from ACT team however has declined and would like to continue with CADI Waiver services.      CADI WILL REMAIN OPEN PAST 30 days of hospital admission     Care Team     CADI Waiver [place]- [place]ina[place]- [phone]     Therapist- Callie Manley LP-[phone]    Psychiatrist-Shirley Gooden NP- [facility]-     Financial Worker- Elvira Bae- [facility]- [phone]    Outpatient DBT Group through [employer] ( [place])        Barriers to Discharge   Ongoing stabilization     Referral Status  No referrals needed at this time     Legal Status  Pt is voluntary

## 2022-03-22 NOTE — PLAN OF CARE
"Goal Outcome Evaluation:    Plan of Care Reviewed With: patient        BS = 166 at dinner time.  Appetite is good.    Patient remains medication compliant.      Patient presents as suspicious and paranoid this evening.    During 1:1 patient reports \"I am feeling sort of sorry for myself and paranoid.  I think some people are out to get me.  I am starting to think I might need long term therapy\".      Patient's friend Jay Jay called and asked staff how much patients rent is.  Jay Jay referred to talk with patient.         Patient attended evening group.    Denies SI/SIB/HI.        "

## 2022-03-22 NOTE — PROGRESS NOTES
03/21/22 2100   Group Therapy Session   Group Attendance attended group session   Total Time patient participated (minutes) 35   Total # Attendees 1   Group Type recreation   Group Topic Covered relaxation techniques   Patient Response/Contribution cooperative with task     Pt attended the Therapeutic Recreation group this evening. Pt was an active participant in the discussion about healthy coping skills and then followed along to a progressive muscle relaxation routine. Pt stated she felt nice and relaxed at the end of the group.

## 2022-03-23 LAB
GLUCOSE BLDC GLUCOMTR-MCNC: 120 MG/DL (ref 70–99)
GLUCOSE BLDC GLUCOMTR-MCNC: 156 MG/DL (ref 70–99)

## 2022-03-23 PROCEDURE — 99232 SBSQ HOSP IP/OBS MODERATE 35: CPT | Performed by: NURSE PRACTITIONER

## 2022-03-23 PROCEDURE — G0177 OPPS/PHP; TRAIN & EDUC SERV: HCPCS

## 2022-03-23 PROCEDURE — 90853 GROUP PSYCHOTHERAPY: CPT

## 2022-03-23 PROCEDURE — 124N000002 HC R&B MH UMMC

## 2022-03-23 PROCEDURE — 250N000013 HC RX MED GY IP 250 OP 250 PS 637: Performed by: PHYSICIAN ASSISTANT

## 2022-03-23 PROCEDURE — 250N000013 HC RX MED GY IP 250 OP 250 PS 637: Performed by: PSYCHIATRY & NEUROLOGY

## 2022-03-23 PROCEDURE — 250N000013 HC RX MED GY IP 250 OP 250 PS 637: Performed by: NURSE PRACTITIONER

## 2022-03-23 RX ORDER — OLANZAPINE 15 MG/1
15 TABLET ORAL EVERY EVENING
Status: DISCONTINUED | OUTPATIENT
Start: 2022-03-23 | End: 2022-03-25

## 2022-03-23 RX ORDER — RISPERIDONE 37.5MG/2ML
37.5 KIT INTRAMUSCULAR
Status: DISCONTINUED | OUTPATIENT
Start: 2022-03-24 | End: 2022-03-30 | Stop reason: HOSPADM

## 2022-03-23 RX ORDER — RISPERIDONE 1 MG/1
1 TABLET ORAL EVERY EVENING
Status: DISCONTINUED | OUTPATIENT
Start: 2022-03-23 | End: 2022-03-25

## 2022-03-23 RX ADMIN — LITHIUM CARBONATE 750 MG: 300 TABLET, EXTENDED RELEASE ORAL at 21:34

## 2022-03-23 RX ADMIN — GABAPENTIN 300 MG: 300 CAPSULE ORAL at 14:12

## 2022-03-23 RX ADMIN — GABAPENTIN 300 MG: 300 CAPSULE ORAL at 21:34

## 2022-03-23 RX ADMIN — LITHIUM CARBONATE 600 MG: 300 TABLET, EXTENDED RELEASE ORAL at 08:47

## 2022-03-23 RX ADMIN — RISPERIDONE 1 MG: 1 TABLET ORAL at 21:35

## 2022-03-23 RX ADMIN — OLANZAPINE 15 MG: 15 TABLET, FILM COATED ORAL at 21:34

## 2022-03-23 RX ADMIN — GABAPENTIN 300 MG: 300 CAPSULE ORAL at 08:47

## 2022-03-23 RX ADMIN — ALBUTEROL SULFATE 2 PUFF: 90 AEROSOL, METERED RESPIRATORY (INHALATION) at 08:48

## 2022-03-23 RX ADMIN — LEVOTHYROXINE SODIUM 50 MCG: 25 TABLET ORAL at 08:47

## 2022-03-23 RX ADMIN — METFORMIN HYDROCHLORIDE 1000 MG: 500 TABLET, EXTENDED RELEASE ORAL at 08:47

## 2022-03-23 RX ADMIN — METFORMIN HYDROCHLORIDE 1000 MG: 500 TABLET, EXTENDED RELEASE ORAL at 17:52

## 2022-03-23 RX ADMIN — FLUTICASONE PROPIONATE 2 SPRAY: 50 SPRAY, METERED NASAL at 08:47

## 2022-03-23 ASSESSMENT — ACTIVITIES OF DAILY LIVING (ADL)
DRESS: STREET CLOTHES
DRESS: SCRUBS (BEHAVIORAL HEALTH);INDEPENDENT
ORAL_HYGIENE: INDEPENDENT
LAUNDRY: WITH SUPERVISION
HYGIENE/GROOMING: INDEPENDENT
HYGIENE/GROOMING: INDEPENDENT
ORAL_HYGIENE: INDEPENDENT

## 2022-03-23 NOTE — PLAN OF CARE
Tasks Complete:  Appointment rescheduled with Dr. Fidelia Serna for 4/12/22 at 3:45pm.     Post round meeting with team @9:30 am updates: Team discussed discharge planning. CADI Waiver will not close.    Current Symptoms include the following: PT engages in delusional thinking and paranoia.     Addressed patient needs/concerns:   Pt did not note any concerns at this time    Discharge Plan or Goal   Plan  Discharge home with CADI Waiver services. Pt would benefit from rep payee and additional cadi waiver services. PT would benefit from ACT team however has declined and would like to continue with CADI Waiver services.      CADI WILL REMAIN OPEN PAST 30 days of hospital admission     Care Team   CADI Waiver CM- Shira MedinaPyfdnbg[place]- [phone]   Therapist- Callie Manley LP-[phone]  Psychiatrist-Shirley GoodenNP- [facility]-   Financial Worker- Elvira aBe- [facility]- [phone]  Outpatient DBT Group through [facility])        Barriers to Discharge   Ongoing stabilization     Referral Status  No referrals needed at this time     Legal Status  Pt is voluntary

## 2022-03-23 NOTE — PLAN OF CARE
03/23/22 1245   Group Therapy Session   Group Attendance attended group session   Time Session Began 1115   Time Session Ended 1200   Total Time patient participated (minutes) 45   Total # Attendees 4   Group Type life skill   Group Topic Covered emotions/expression;coping skills/lifestyle management   Group Session Detail OT Topic   Patient Response/Contribution disorganized   Patient Response Detail Pt attended OT topic group (Situations in our control vs outside) independently and was cooperative.  Pt demonstrated limited insight into topic and shared off topic information with peers and staff.  Pt was moderately  attententive to task however had limited interaction with staff and peers and no eye contact.Pt demonstrated a very limited understanding of material covered.

## 2022-03-23 NOTE — PLAN OF CARE
03/22/22 2100   Group Therapy Session   Group Attendance attended group session   Time Session Began 2000   Time Session Ended 2100   Total Time patient participated (minutes) 45   Total # Attendees 2   Group Type expressive therapy  (art therapy)   Group Topic Covered emotions/expression   Group Session Detail genuine self   Patient Response/Contribution cooperative with task;organized   Patient Response Detail Maddy presented as calm and pleasant. She engaged in group discussion about her genuine self, focusing on her connection with her family. She participated in making a drawing representing her genuine self. She made a drawing of lightening inside a silhouette of a person. She explained that the lightening is referencing a song that her daughter likes. She appeared calm and focused while drawing, but became tired and chose to leave group a little early, stating she would finish it in another group. She interacted appropriately and was a positive participant.    Goal Outcome Evaluation:

## 2022-03-23 NOTE — PLAN OF CARE
Pt appeared to sleep 4.25 hours. No concerns reported. Safety checks done at least every 15 minutes. Pt has been intermittently sitting in lounge reading and listening to headphones.

## 2022-03-23 NOTE — PLAN OF CARE
"   03/23/22 1200   Group Therapy Session   Group Attendance attended group session   Time Session Began 1015   Time Session Ended 1115   Total Time patient participated (minutes) 45   Total # Attendees 3   Group Type task skill   Group Topic Covered problem-solving;coping skills/lifestyle management   Group Session Detail OT Clinic   Patient Response/Contribution verbalizations were off topic;disorganized;discussed personal experience with topic   Patient Response Detail Pt actively participated in occupational therapy clinic to facilitate coping skill exploration, creative expression within personally meaningful activities, and clinical observation of social, cognitive, and kinesthetic performance skills. Pt response: Chosen activity: journal in notebook. Independent to initiate, gather materials, sequence and adjust to workspace demands as needed. Demonstrated poor focus, planning, and problem solving for this simple task. Pt shared her journal entry with this writer, and pt had a \"business plan\" written up for two businesses to work together to genetically  babies and use genetics to choose the appropriate partner.  Pt also reported she did journal about her symptoms, and felt like medications were helping her at this time.  She had very limited insight that current thoughts were off topic.  Pt made no eye contact with peers or staff during any conversation.                         "

## 2022-03-23 NOTE — PROGRESS NOTES
"[facility], [place]   Psychiatric Progress Note        Interim History:     The patient's care was discussed with the treatment team during the daily team meeting and staff's chart notes were reviewed.  Pt was documented as sleeping 4.25 hours during the overnight shift.  She took PRN Albuterol x 1 and PRN Melatonin x 1 yesterday.  She attended 1 group.  She has been spending time on the phone, napping and journaling.  States she woke twice in the middle of the night but believes she obtained an adequate amount of sleep.  Pt had written her plan for manufacturing a device to sort sperm and eggs based on genetic information with a goal of producing the ideal human.  She discussed her desire to find a company with enough capital to fund this project.  She recognizes she does not have the knowledge to develop this device, but believes that if she brings her ideas to a specialist, they would be able to do so.  Pt believes that her ideas \"might be grandiose, but not delusional.  I'm more grounded and less delusional.\"  Pt says that the carpet in her apartment has a \"chemical stain that's not visible\" from her cleaning it inappropriately.  She said she feels \"kind of\" comfortable returning to her apartment after discharge.  Pt reports feeling irritable at times, related to other patients' disruptive behaviors.  She endorses social anxiety.  She is worried about taking Zyprexa and Risperdal concurrently.  Discussed goals with cross titration and PO monotherapy with Zyprexa, along with ANTUNEZ Risperdal Consta.  Pt was agreeable.  She said she recognizes she would benefit from remaining in the hospital longer while this change is implemented.            Medications:       fluticasone  2 spray Both Nostrils Daily     gabapentin  300 mg Oral TID     levothyroxine  50 mcg Oral QAM AC     lithium ER  600 mg Oral QAM     lithium ER  750 mg Oral At Bedtime     metFORMIN  1,000 mg Oral BID w/meals     " "OLANZapine  15 mg Oral QPM     risperiDONE  1 mg Oral QPM     risperiDONE microspheres ER  25 mg Intramuscular Q14 Days          Allergies:     Allergies   Allergen Reactions     Dust Mites Shortness Of Breath     Animal Dander      Other reaction(s): *Unknown     Metformin Fatigue     Patient is taking at home as of 3/3/22     Mold      Other reaction(s): Runny Nose     Trees           Labs:     Recent Results (from the past 24 hour(s))   Glucose by meter    Collection Time: 03/22/22  5:41 PM   Result Value Ref Range    GLUCOSE BY METER POCT 166 (H) 70 - 99 mg/dL   Glucose by meter    Collection Time: 03/23/22  8:03 AM   Result Value Ref Range    GLUCOSE BY METER POCT 156 (H) 70 - 99 mg/dL          Psychiatric Examination:     /84 (BP Location: Left arm, Patient Position: Sitting)   Pulse 105   Temp 98.4  F (36.9  C) (Oral)   Resp 17   Ht 1.626 m (5' 4\")   Wt 114.3 kg (251 lb 14.4 oz)   SpO2 95%   BMI 43.24 kg/m    Weight is 251 lbs 14.4 oz  Body mass index is 43.24 kg/m .    Orthostatic Vitals  Report      Most Recent      Standing Orthostatic /76 03/15 0759    Standing Orthostatic Pulse (bpm) 118 03/15 0759         Appearance:  alert, dressed in hospital scrubs, fair grooming/hygiene  Attitude:  cooperative  Eye Contact:  minimal  Mood: reports feeling anxious and irritable at times  Affect:  somewhat blunted  Speech:  clear, coherent and normal prosody, somewhat hyperverbal, rambling  Language: fluent and intact in English  Psychomotor, Gait, Musculoskeletal:  no evidence of tardive dyskinesia, dystonia, or tics  Thought Process:  better organized, compared to admission  Associations: no looseness of associations  Thought Content:  no evidence of suicidal ideation or homicidal ideation and denies hallucinations, paranoid/delusional thought content is present  Insight:  limited, but improving   Judgement:  limited, but improving   Oriented to:  time, date, person, and place  Attention Span and " Concentration:  limited, improving  Recent and Remote Memory:  fair  Fund of Knowledge: average      Clinical Global Impressions  First:  Considering your total clinical experience with this particular patient population, how severe are the patient's symptoms at this time?: 7 (03/04/22 1628)  Compared to the patient's condition at the START of treatment, this patient's condition is: 4 (03/04/22 1628)  Most recent:  Considering your total clinical experience with this particular patient population, how severe are the patient's symptoms at this time?: 5 (03/16/22 0833)  Compared to the patient's condition at the START of treatment, this patient's condition is: 3 (03/16/22 0833)           Precautions:     Behavioral Orders   Procedures     Code 1 - Restrict to Unit     Routine Programming     As clinically indicated     Status 15     Every 15 minutes.          Diagnoses:     Schizoaffective disorder, bipolar type, with multiple episodes, currently in acute episode with mixed mood state and active psychosis symptoms  Generalized anxiety disorder   Diabetes mellitus, type 2  Hypothyroidism  Asthma   Chronic leukocytosis          Plan:     Medications:  Continue Neurontin 300 mg TID.  Continue Lithium 600 mg in the AM and 750 mg at HS (level 0.9).  Reduce Risperdal to 1 mg at HS.  Increase Zyprexa to 15 mg at HS.  Risperdal Consta 25 mg was initiated on 3/10; increase to 37.5 mg tomorrow.  She is interested in Zyprexa Relprevv but aware that this is not on the hospital formulary, nor is it available through most agencies in the community.  Continue PRNs of Cogentin, Hydroxyzine, Melatonin and Zyprexa.    Discharge to her apartment when stable.  She has outpatient psychiatry, ILS and a home care RN.          Mary Reeves, APRN, CNP  [facility] Psychiatry

## 2022-03-23 NOTE — PLAN OF CARE
Goal Outcome Evaluation:        The patient has c/o of feeling bored. Said she is tired of the hospital restrictions and being told what to do. Her affect is blunted and her mood is slightly anxious she did not look at writer for most of the time during check-in. The plan of care was reviewed and encouraged group attendance. Her appetite is good, ate 75% of dinner. The patient denied pain, anxiety and depression, said no SI or hallucinations and agreed to tell staff if she has any thoughts of hurting herself. She attended the evening group. Was compliant with scheduled medications.     Blood sugar this shift: 120    Prn's given this shift: None

## 2022-03-23 NOTE — PROGRESS NOTES
Behavioral Health  Note   Behavioral Health  Spirituality Group Note     [address]    Name: Maddy Ortiz    YOB: 1985   MRN: [id number]    Age: 36 year old     Patient attended -led group, which included discussion of spirituality, coping with illness and building resilience.   Patient attended group for 1.0 hrs.   patient demonstrated an appreciation of topic's application for their personal circumstances.     [facility]  Staff    Page [phone]

## 2022-03-23 NOTE — PLAN OF CARE
"  Problem: Behavior Regulation Impairment (Psychotic Signs/Symptoms)  Goal: Improved Behavioral Control (Psychotic Signs/Symptoms)  Outcome: Ongoing, Not Progressing   Goal Outcome Evaluation:    Plan of Care Reviewed With: patient      \"I'm better, I'm more grounded, and my thoughts are more organized.  My  sleep is getting better. States has social anxiety. Attends most the groups. Is \"a little\" anxious. Thoughts are more clear and is able to focus better. Denies racing thoughts and hallucinations. Spends a lot of time in room when not in groups.            "

## 2022-03-23 NOTE — PLAN OF CARE
"Goal Outcome Evaluation:    Plan of Care Reviewed With: patient     Patient has had an uneventful shift, reports that she feels \"better and hopeful\".  Mood is calm.      Patient made a few phone calls, attended evening group, is medication compliant.    BS = 166.     Denies SI/SIB/HI and AVH.        "

## 2022-03-24 LAB
GLUCOSE BLDC GLUCOMTR-MCNC: 107 MG/DL (ref 70–99)
GLUCOSE BLDC GLUCOMTR-MCNC: 160 MG/DL (ref 70–99)

## 2022-03-24 PROCEDURE — H2032 ACTIVITY THERAPY, PER 15 MIN: HCPCS

## 2022-03-24 PROCEDURE — 99232 SBSQ HOSP IP/OBS MODERATE 35: CPT | Performed by: NURSE PRACTITIONER

## 2022-03-24 PROCEDURE — 250N000013 HC RX MED GY IP 250 OP 250 PS 637: Performed by: NURSE PRACTITIONER

## 2022-03-24 PROCEDURE — 124N000002 HC R&B MH UMMC

## 2022-03-24 PROCEDURE — G0177 OPPS/PHP; TRAIN & EDUC SERV: HCPCS

## 2022-03-24 PROCEDURE — 250N000013 HC RX MED GY IP 250 OP 250 PS 637: Performed by: PSYCHIATRY & NEUROLOGY

## 2022-03-24 PROCEDURE — 250N000011 HC RX IP 250 OP 636: Performed by: NURSE PRACTITIONER

## 2022-03-24 PROCEDURE — 250N000013 HC RX MED GY IP 250 OP 250 PS 637: Performed by: PHYSICIAN ASSISTANT

## 2022-03-24 RX ADMIN — METFORMIN HYDROCHLORIDE 1000 MG: 500 TABLET, EXTENDED RELEASE ORAL at 17:39

## 2022-03-24 RX ADMIN — FLUTICASONE PROPIONATE 2 SPRAY: 50 SPRAY, METERED NASAL at 08:12

## 2022-03-24 RX ADMIN — LITHIUM CARBONATE 600 MG: 300 TABLET, EXTENDED RELEASE ORAL at 08:09

## 2022-03-24 RX ADMIN — GABAPENTIN 300 MG: 300 CAPSULE ORAL at 13:44

## 2022-03-24 RX ADMIN — GABAPENTIN 300 MG: 300 CAPSULE ORAL at 08:09

## 2022-03-24 RX ADMIN — LITHIUM CARBONATE 750 MG: 300 TABLET, EXTENDED RELEASE ORAL at 21:24

## 2022-03-24 RX ADMIN — GABAPENTIN 300 MG: 300 CAPSULE ORAL at 21:25

## 2022-03-24 RX ADMIN — LEVOTHYROXINE SODIUM 50 MCG: 25 TABLET ORAL at 08:09

## 2022-03-24 RX ADMIN — RISPERIDONE 1 MG: 1 TABLET ORAL at 21:25

## 2022-03-24 RX ADMIN — OLANZAPINE 15 MG: 15 TABLET, FILM COATED ORAL at 21:25

## 2022-03-24 RX ADMIN — ALBUTEROL SULFATE 2 PUFF: 90 AEROSOL, METERED RESPIRATORY (INHALATION) at 08:13

## 2022-03-24 RX ADMIN — RISPERIDONE 37.5 MG: KIT at 10:32

## 2022-03-24 RX ADMIN — METFORMIN HYDROCHLORIDE 1000 MG: 500 TABLET, EXTENDED RELEASE ORAL at 08:09

## 2022-03-24 ASSESSMENT — ACTIVITIES OF DAILY LIVING (ADL)
LAUNDRY: WITH SUPERVISION
ORAL_HYGIENE: INDEPENDENT
DRESS: INDEPENDENT
HYGIENE/GROOMING: INDEPENDENT

## 2022-03-24 NOTE — PLAN OF CARE
Tasks Complete:  Appointment rescheduled with Dr. Fidelia Serna for 4/12/22 at 3:45pm.      Post round meeting with team @9:30 am updates: Team discussed discharge planning. CADI Waiver will not close.  Undergoing medication changes (Risperidone to Zyprexa).  Will have consta injection today     Current Symptoms include the following: PT engages in delusional thinking and paranoia.      Addressed patient needs/concerns:   Pt did not note any concerns at this time     Discharge Plan or Goal   Plan  Discharge home with CADI Waiver services. Pt would benefit from rep payee and additional cadi waiver services. PT would benefit from ACT team however has declined and would like to continue with CADI Waiver services.      CADI WILL REMAIN OPEN PAST 30 days of hospital admission     Care Team     CADI Waiver CM- Shira MedinaKirxzca[place]- [phone]     Therapist- Callie Manley LP-[phone]    Psychiatrist-Shirley GoodenNP- [employer]-     Financial Worker- Elvira Bae- [facility]- [phone]    Outpatient DBT Group through [employer] ( [place])        Barriers to Discharge   Ongoing stabilization     Referral Status  No referrals needed at this time     Legal Status  Pt is voluntary

## 2022-03-24 NOTE — PLAN OF CARE
03/24/22 1345   Group Therapy Session   Group Attendance attended group session   Time Session Began 1015   Time Session Ended 1100   Total Time patient participated (minutes) 45   Total # Attendees 5   Group Type expressive therapy   Group Topic Covered coping skills/lifestyle management;emotions/expression   Group Session Detail OT Clinic   Patient Response/Contribution discussed personal experience with topic;disorganized   Patient Response Detail Pt actively participated in occupational therapy clinic to facilitate coping skill exploration, creative expression within personally meaningful activities, and clinical observation of social, cognitive, and kinesthetic performance skills. Pt response: Chosen activity: journaling. Independent to initiate, gather materials, sequence and adjust to workspace demands as needed. Demonstrated limited focus, planning, and problem solving for this simple task. Able to ask for assistance and selectively social with peers and staff. Interactions not always on topic.

## 2022-03-24 NOTE — PROGRESS NOTES
"[facility], [place]   Psychiatric Progress Note        Interim History:     The patient's care was discussed with the treatment team during the daily team meeting and staff's chart notes were reviewed.  Pt was documented as sleeping 6.25 hours during the overnight shift.  She attended 4 groups yesterday and was noted to be very talkative, often dominating the conversation.  She took PRN Albuterol x 1 yesterday.  Pt reports she has been exercising and journaling.  States her mood is \"good\" overall but bored and frustrated with hospitalization.  She says that her concentration is \"a little better\" as evidenced by ability to make progress working on a puzzle.  She reports her anxiety is low.  Sleep has been \"okay.\"  States appetite is unchanged compared to admission.  Pt was less hyperverbal and less preoccupied with her desire to devise a method to sort genetic material at the time of conception to create the ideal human.  Discussed plan for Risperdal Consta today and continuing cross titration from Risperdal to Zyprexa, and recommendation that she remain hospitalized over the weekend.  She was agreeable.            Medications:       fluticasone  2 spray Both Nostrils Daily     gabapentin  300 mg Oral TID     levothyroxine  50 mcg Oral QAM AC     lithium ER  600 mg Oral QAM     lithium ER  750 mg Oral At Bedtime     metFORMIN  1,000 mg Oral BID w/meals     OLANZapine  15 mg Oral QPM     risperiDONE  1 mg Oral QPM     risperiDONE microspheres ER  37.5 mg Intramuscular Q14 Days          Allergies:     Allergies   Allergen Reactions     Dust Mites Shortness Of Breath     Animal Dander      Other reaction(s): *Unknown     Metformin Fatigue     Patient is taking at home as of 3/3/22     Mold      Other reaction(s): Runny Nose     Trees           Labs:     Recent Results (from the past 24 hour(s))   Glucose by meter    Collection Time: 03/23/22  5:55 PM   Result Value Ref Range    GLUCOSE BY " "METER POCT 120 (H) 70 - 99 mg/dL   Glucose by meter    Collection Time: 03/24/22  8:05 AM   Result Value Ref Range    GLUCOSE BY METER POCT 160 (H) 70 - 99 mg/dL          Psychiatric Examination:     /82 (BP Location: Left arm)   Pulse 97   Temp 97.4  F (36.3  C) (Temporal)   Resp 16   Ht 1.626 m (5' 4\")   Wt 114.3 kg (251 lb 14.4 oz)   SpO2 95%   BMI 43.24 kg/m    Weight is 251 lbs 14.4 oz  Body mass index is 43.24 kg/m .    Orthostatic Vitals  Report      Most Recent      Standing Orthostatic /76 03/15 0759    Standing Orthostatic Pulse (bpm) 118 03/15 0759         Appearance:  alert, dressed in hospital scrubs, fair grooming/hygiene  Attitude:  cooperative  Eye Contact:  minimal  Mood: \"good\" overall, anxiety is low, endorses some irritability  Affect:  somewhat blunted  Speech:  clear, coherent and normal prosody, less hyperverbal/rambling  Language: fluent and intact in English  Psychomotor, Gait, Musculoskeletal:  no evidence of tardive dyskinesia, dystonia, or tics  Thought Process:  better organized, compared to admission  Associations: no looseness of associations  Thought Content:  no evidence of suicidal ideation or homicidal ideation and denies hallucinations, paranoid/delusional thought content is present  Insight:  limited, but improving   Judgement:  limited, but improving   Oriented to:  time, date, person, and place  Attention Span and Concentration:  limited, improving  Recent and Remote Memory:  fair  Fund of Knowledge: average      Clinical Global Impressions  First:  Considering your total clinical experience with this particular patient population, how severe are the patient's symptoms at this time?: 7 (03/04/22 1628)  Compared to the patient's condition at the START of treatment, this patient's condition is: 4 (03/04/22 1628)  Most recent:  Considering your total clinical experience with this particular patient population, how severe are the patient's symptoms at this time?: " 6 (03/24/22 0421)  Compared to the patient's condition at the START of treatment, this patient's condition is: 3 (03/24/22 0421)         Precautions:     Behavioral Orders   Procedures     Code 1 - Restrict to Unit     Routine Programming     As clinically indicated     Status 15     Every 15 minutes.          Diagnoses:     Schizoaffective disorder, bipolar type, with multiple episodes, currently in acute episode with mixed mood state and active psychosis symptoms  Generalized anxiety disorder   Diabetes mellitus, type 2  Hypothyroidism  Asthma   Chronic leukocytosis          Plan:     Medications:  Continue Neurontin 300 mg TID.  Continue Lithium 600 mg in the AM and 750 mg at HS (level 0.9).  Continue Risperdal 1 mg at HS; plan to taper off.  Continue Zyprexa to 15 mg at HS.  Risperdal Consta 25 mg was initiated on 3/10; increase to 37.5 mg tomorrow.  She is interested in Zyprexa Relprevv but aware that this is not on the hospital formulary, nor is it available through most agencies in the community.  Continue PRNs of Cogentin, Hydroxyzine, Melatonin and Zyprexa.    Discharge to her apartment when stable.  She has outpatient psychiatry, ILS and a home care RN.          Mary Reeves, PHILL, CNP  [facility] Psychiatry

## 2022-03-24 NOTE — PLAN OF CARE
03/24/22 1515   Group Therapy Session   Group Attendance attended group session   Time Session Began 1415   Time Session Ended 1500   Total Time patient participated (minutes) 45   Total # Attendees 2   Group Type expressive therapy   Group Topic Covered emotions/expression   Group Session Detail OT Clinic   Patient Response/Contribution did not discuss personal experience   Patient Response Detail Pt attended  occupational therapy clinic to facilitate coping skill exploration, creative expression within personally meaningful activities, and clinical observation of social, cognitive, and kinesthetic performance skills. Pt response: Chosen activity: Pt observed part of the time and journaled part of the time . Needed prompts to to initiate, gather materials, sequence and adjust to workspace demands as needed. Demonstrated minimal focus, planning, and problem solving for this simple task. Able to ask for assistance. Pt did socialize with staff if prompted. Pt reports she would like to obtain an education degree and teach elementary aged kids business skills.  She feels like they could choose business as an elective, and that she would not be qualified enough to teach business skills to high school kids.  Pt continues to demonstrated limited insight.

## 2022-03-24 NOTE — PLAN OF CARE
03/24/22 1400   Group Therapy Session   Group Attendance attended group session   Time Session Began 1115   Time Session Ended 1200   Total Time patient participated (minutes) 45   Total # Attendees 4   Group Type life skill   Group Topic Covered coping skills/lifestyle management;emotions/expression   Group Session Detail OT Topic Group   Patient Response/Contribution discussed personal experience with topic;expressed understanding of topic   Patient Response Detail Pt independently attended topic group and was cooperative.  Pt was able to complete handout independently and pt was on topic.  Pt shared thoughts about positive thinking with the group, however the more she interacted the more she veered off topic.  Pt continues to change topic to coming up with a business plan to genetically pick what type of child you would like to have.  Pt is redirectable however continues to appear to lack insight into current issues.

## 2022-03-24 NOTE — PLAN OF CARE
Goal Outcome Evaluation:    Plan of Care Reviewed With: patient         Reports that she feels much more comfortable at home and is looking forward to discharging soon. The patient talked about journaling as a helpful tool for her to process her emotions. She said that it is difficult for her to talk with people in the hospital. She rambling at times although speech is improved. She talked about education, her dreams and her skill set. Reported having social anxiety and said no depression. Denied SI and said no hallucinations and agreed to tell staff if she has thoughts of self-harm or suicide. Reports that she no longer has racing thoughts that has increased her ability to be mindful.  The plan of care was reviewed. The patient agreed with the goal to attend evening group. The patient met her goal, attended group. After group worked on a puzzle in the milieu. The patient was compliant with scheduled medications.     Blood sugar this shift: 107    Prn's this shift: None

## 2022-03-24 NOTE — PROGRESS NOTES
"   03/23/22 2100   Group Therapy Session   Group Attendance attended group session   Time Session Began 2000   Time Session Ended 2050   Total Time patient participated (minutes) 50   Total # Attendees 3   Group Type psychotherapeutic   Group Topic Covered emotions/expression;structured socialization   Group Session Detail   (verbal process group)   Patient Response/Contribution confused;cooperative with task;discussed personal experience with topic;disorganized;unable to interrupt patient;unable to sequence the task;verbalizations were off topic   pt reported mood as anxious and irritable. She perseverates about her past and is difficult to interrupt and share space with peers . She is not being disrespectful, but just has long stream of consciousness narratives about father of her child and drug use, about being a mother and about confusion about career direction. She also talks about feeling misunderstood because she has \" autism.\"  "

## 2022-03-24 NOTE — PLAN OF CARE
Problem: Suicidal Behavior  Goal: Suicidal Behavior is Absent or Managed  Outcome: Ongoing, Progressing     Problem: Anxiety  Goal: Anxiety Reduction or Resolution  Outcome: Ongoing, Progressing   Goal Outcome Evaluation:    Pt out in the milleu, not socializing with peers. Mood is calm, affect blunted. Pt reports being bored and tired of all the restrictions in the unit. Pt states hopeful, optimistic about future. Wants to call the [employer] and enrol online classes once discharged. Pt denies and Anxiety, depression, SI,SIB. Pt med compliant. Denies and side effects. Pt requested prn albuterol this morning. . Ate 100% of her meals. Pt attended groups.Will continue to monitor.

## 2022-03-25 LAB
GLUCOSE BLDC GLUCOMTR-MCNC: 170 MG/DL (ref 70–99)
GLUCOSE BLDC GLUCOMTR-MCNC: 171 MG/DL (ref 70–99)

## 2022-03-25 PROCEDURE — 124N000002 HC R&B MH UMMC

## 2022-03-25 PROCEDURE — 250N000013 HC RX MED GY IP 250 OP 250 PS 637: Performed by: NURSE PRACTITIONER

## 2022-03-25 PROCEDURE — G0177 OPPS/PHP; TRAIN & EDUC SERV: HCPCS

## 2022-03-25 PROCEDURE — 99232 SBSQ HOSP IP/OBS MODERATE 35: CPT | Performed by: NURSE PRACTITIONER

## 2022-03-25 PROCEDURE — 250N000013 HC RX MED GY IP 250 OP 250 PS 637: Performed by: PHYSICIAN ASSISTANT

## 2022-03-25 PROCEDURE — 250N000013 HC RX MED GY IP 250 OP 250 PS 637: Performed by: PSYCHIATRY & NEUROLOGY

## 2022-03-25 PROCEDURE — 90853 GROUP PSYCHOTHERAPY: CPT

## 2022-03-25 RX ORDER — RISPERIDONE 1 MG/1
1 TABLET ORAL EVERY EVENING
Status: COMPLETED | OUTPATIENT
Start: 2022-03-25 | End: 2022-03-26

## 2022-03-25 RX ORDER — OLANZAPINE 20 MG/1
20 TABLET ORAL EVERY EVENING
Status: DISCONTINUED | OUTPATIENT
Start: 2022-03-27 | End: 2022-03-30 | Stop reason: HOSPADM

## 2022-03-25 RX ORDER — OLANZAPINE 15 MG/1
15 TABLET ORAL EVERY EVENING
Status: COMPLETED | OUTPATIENT
Start: 2022-03-25 | End: 2022-03-26

## 2022-03-25 RX ADMIN — METFORMIN HYDROCHLORIDE 1000 MG: 500 TABLET, EXTENDED RELEASE ORAL at 08:09

## 2022-03-25 RX ADMIN — LEVOTHYROXINE SODIUM 50 MCG: 25 TABLET ORAL at 08:09

## 2022-03-25 RX ADMIN — FLUTICASONE PROPIONATE 2 SPRAY: 50 SPRAY, METERED NASAL at 08:11

## 2022-03-25 RX ADMIN — GABAPENTIN 300 MG: 300 CAPSULE ORAL at 08:09

## 2022-03-25 RX ADMIN — METFORMIN HYDROCHLORIDE 1000 MG: 500 TABLET, EXTENDED RELEASE ORAL at 17:43

## 2022-03-25 RX ADMIN — LITHIUM CARBONATE 600 MG: 300 TABLET, EXTENDED RELEASE ORAL at 08:09

## 2022-03-25 RX ADMIN — LITHIUM CARBONATE 750 MG: 300 TABLET, EXTENDED RELEASE ORAL at 21:28

## 2022-03-25 RX ADMIN — OLANZAPINE 15 MG: 15 TABLET, FILM COATED ORAL at 21:28

## 2022-03-25 RX ADMIN — GABAPENTIN 300 MG: 300 CAPSULE ORAL at 14:02

## 2022-03-25 RX ADMIN — GABAPENTIN 300 MG: 300 CAPSULE ORAL at 21:27

## 2022-03-25 RX ADMIN — RISPERIDONE 1 MG: 1 TABLET ORAL at 21:28

## 2022-03-25 ASSESSMENT — ACTIVITIES OF DAILY LIVING (ADL)
LAUNDRY: WITH SUPERVISION
HYGIENE/GROOMING: INDEPENDENT
DRESS: INDEPENDENT
ORAL_HYGIENE: INDEPENDENT
ORAL_HYGIENE: INDEPENDENT
LAUNDRY: WITH SUPERVISION
HYGIENE/GROOMING: INDEPENDENT
DRESS: INDEPENDENT

## 2022-03-25 NOTE — PLAN OF CARE
03/25/22 1221   Group Therapy Session   Group Attendance attended group session   Time Session Began 1015   Time Session Ended 1100   Total Time patient participated (minutes) 45   Total # Attendees 3   Group Type expressive therapy   Group Topic Covered problem-solving;coping skills/lifestyle management   Group Session Detail OT Clinic   Patient Response/Contribution disorganized   Patient Response Detail See Note     Pt observed in occupational therapy clinic to facilitate coping skill exploration, creative expression within personally meaningful activities, and clinical observation of social, cognitive, and kinesthetic performance skills. Pt response: Chosen activity: Pt sat at table looking preoccupied. Did look through cupboards for a task but did not choose one.. Even with assistance pt was unable to  to initiate, gather materials, sequence and adjust to workspace demands as needed. Demonstrated poor focus, planning, and problem solving.  Pt did initiate conversations with peers and staff, however it was off topic and very focused on business planning and genetically engineering babies (which has been a theme for her thoughts throughout the week.)  Pt demonstrated little insight into current issues.

## 2022-03-25 NOTE — PROGRESS NOTES
Pt appears to be sleeping 5.25 hrs. Pt on status 15 min checks. Pt was up here and there requesting snacks and some supplies from the locker. No other concerns. Will continue to monitor.

## 2022-03-25 NOTE — PROGRESS NOTES
03/24/22 2100   Group Therapy Session   Time Session Began 2005   Time Session Ended 2101   Total # Attendees 3   Group Type expressive therapy   Group Topic Covered relaxation techniques      Music Therapy Group note:     Number of patients in group: 3     Scope of service: psychodynamic      Intervention: Evening Relaxation      Goal of group: anxiety reduction      Patient response/reaction to treatment intervention(s): Cooperatively engaged in Evening Music Relaxation group designed to decrease anxiety.  Calm affect, appropriately engaged in session, responding well to the music.    Maddy stated how she found the session helpful at group close.  Much more grounded without pressured speech tonight.     56 minutes participation       Treva Henriquez, MT-BC  Board-Certified Music Therapist

## 2022-03-25 NOTE — PROGRESS NOTES
"[facility], [place]   Psychiatric Progress Note        Interim History:     The patient's care was discussed with the treatment team during the daily team meeting and staff's chart notes were reviewed.  Pt was documented as sleeping 5.25 hours during the overnight shift.  She has been attending groups, noted to be distractible.  Pt made eye contact and smiled on a few occasions today, an improvement for her.  She said that she generally avoids looking at people's directly, particularly when she is not well, because she looks at their clothing, body parts, etc. rather than looking at their faces.  Speech was less rambling and overly inclusive today.  Pt states she is \"less irritable, getting more stable.\"  She says her concentration as better as evidenced by ability to work on a puzzle.  States she has fewer racing thoughts.  Pt says she is \"less fixated\" on developing a gene sorting contraceptive device.  States she is willing to return to her apartment with carpet as is, explaining that she has endured worse smells in the hospital.   She denies hallucinations.  Pt reports concerns about weight gain, otherwise tolerating medications well.            Medications:       fluticasone  2 spray Both Nostrils Daily     gabapentin  300 mg Oral TID     levothyroxine  50 mcg Oral QAM AC     lithium ER  600 mg Oral QAM     lithium ER  750 mg Oral At Bedtime     metFORMIN  1,000 mg Oral BID w/meals     OLANZapine  15 mg Oral QPM     [START ON 3/27/2022] OLANZapine  20 mg Oral QPM     risperiDONE  1 mg Oral QPM     risperiDONE microspheres ER  37.5 mg Intramuscular Q14 Days          Allergies:     Allergies   Allergen Reactions     Dust Mites Shortness Of Breath     Animal Dander      Other reaction(s): *Unknown     Metformin Fatigue     Patient is taking at home as of 3/3/22     Mold      Other reaction(s): Runny Nose     Trees           Labs:     Recent Results (from the past 24 hour(s))   Glucose " "by meter    Collection Time: 03/24/22  5:43 PM   Result Value Ref Range    GLUCOSE BY METER POCT 107 (H) 70 - 99 mg/dL   Glucose by meter    Collection Time: 03/25/22  8:05 AM   Result Value Ref Range    GLUCOSE BY METER POCT 171 (H) 70 - 99 mg/dL          Psychiatric Examination:     /79 (BP Location: Left arm, Patient Position: Sitting, Cuff Size: Adult Large)   Pulse 99   Temp 97.9  F (36.6  C) (Oral)   Resp 18   Ht 1.626 m (5' 4\")   Wt 114.3 kg (251 lb 14.4 oz)   SpO2 95%   BMI 43.24 kg/m    Weight is 251 lbs 14.4 oz  Body mass index is 43.24 kg/m .    Orthostatic Vitals  Report      Most Recent      Standing Orthostatic /76 03/15 0759    Standing Orthostatic Pulse (bpm) 118 03/15 0759         Appearance:  alert, dressed in hospital scrubs, fair grooming/hygiene  Attitude:  cooperative  Eye Contact:  minimal but improved  Mood: \"better,\" less irritable  Affect:  more range, occasional smiling  Speech:  clear, coherent and normal prosody, less hyperverbal/rambling  Language: fluent and intact in English  Psychomotor, Gait, Musculoskeletal:  no evidence of tardive dyskinesia, dystonia, or tics  Thought Process:  better organized, compared to admission  Associations: no looseness of associations  Thought Content:  no evidence of suicidal ideation or homicidal ideation and denies hallucinations, paranoid/delusional thought content is present  Insight:  limited, but improving   Judgement:  limited, but improving   Oriented to:  time, date, person, and place  Attention Span and Concentration:  limited, improving  Recent and Remote Memory:  fair  Fund of Knowledge: average      Clinical Global Impressions  First:  Considering your total clinical experience with this particular patient population, how severe are the patient's symptoms at this time?: 7 (03/04/22 1628)  Compared to the patient's condition at the START of treatment, this patient's condition is: 4 (03/04/22 1628)  Most " recent:  Considering your total clinical experience with this particular patient population, how severe are the patient's symptoms at this time?: 6 (03/24/22 0421)  Compared to the patient's condition at the START of treatment, this patient's condition is: 3 (03/24/22 0421)         Precautions:     Behavioral Orders   Procedures     Code 1 - Restrict to Unit     Routine Programming     As clinically indicated     Status 15     Every 15 minutes.          Diagnoses:     Schizoaffective disorder, bipolar type, with multiple episodes, currently in acute episode with mixed mood state and active psychosis symptoms  Generalized anxiety disorder   Diabetes mellitus, type 2  Hypothyroidism  Asthma   Chronic leukocytosis          Plan:     Medications:  Continue Neurontin 300 mg TID.  Continue Lithium 600 mg in the AM and 750 mg at HS (level 0.9).  Complete taper off Risperdal and increase Zyprexa to 20 mg at HS this weekend.  Risperdal Consta 25 mg was initiated on 3/10 and 37.5 mg was administered on 3/24.  She is interested in Zyprexa Relprevv but aware that this is not on the hospital formulary, nor is it available through most agencies in the community.  Continue PRNs of Cogentin, Hydroxyzine, Melatonin and Zyprexa.    Discharge to her apartment when stable, goal for next week.  She has outpatient psychiatry, ILS and a home care RN.          PHILL Barahona, CNP  [facility]

## 2022-03-25 NOTE — PLAN OF CARE
03/25/22 1226   Group Therapy Session   Group Attendance attended group session   Time Session Began 1115   Time Session Ended 1200   Total Time patient participated (minutes) 45   Total # Attendees 3   Group Type life skill   Group Topic Covered coping skills/lifestyle management;problem-solving;emotions/expression   Group Session Detail OT Topic Group   Patient Response/Contribution unable to comprehend information   Patient Response Detail See Note   Pt independently attended topic group today and was cooperative.  Pt demonstrated a poor  understanding of the topic covered and no insight into their own specific issues related to topic.  Pt was independently social with peers and staff however off topic. Pt's affect was flat and attention span was decreased.  Pt will continue to be encouraged to attend groups for ongoing assessment and to work toward goals identified on plan of care.

## 2022-03-25 NOTE — PLAN OF CARE
Tasks Complete:    [facility] emailed [facility] CM with [facility] regarding medication management surrounding Risperdal consta injection    CTC left vm for pt's father to discuss baseline symptoms.    Post round meeting with team @9:30 am updates: med changes, follow ups, group attendance,summary of team meeting    Current Symptoms include the following:   She attended all groups today. Rapid speech is lessening and reports feeling less irriation and more stable. She reports her concenation is improving and is less fixated on delusional thoughts surrounding gene testing and making the perfect child.     Addressed patient needs/concerns:  PT did not note any concerns/ needs at this time    Discharge Plan or Goal   Plan    Discharge to her apartment when stable, goal for next week.  She has outpatient psychiatry, ILS and a home care RN.     PT received Risperdal consta injection 3/24 and will need injections every two weeks- needs med management plan prior to discharge   CADI WILL REMAIN OPEN PAST 30 days of hospital admission    Care Team     CAD[facility] Waiver CM- Shira MedinaVjojpgw[facility]- [phone]     Therapist- Callie Manley LP-[phone]    Psychiatrist-Shirley Gooden NP- [facility]-     Financial Worker- Elvira Bae- [facility]- [phone]    Outpatient DBT Group through [facility]       Barriers to Discharge   Ongoing stabilization     Referral Status  No referrals needed at this time     Legal Status  Pt is voluntary

## 2022-03-25 NOTE — PLAN OF CARE
Goal Outcome Evaluation:        The patient was present in the milieu, making phone calls, using the exercise bike and sitting at the dinning room table reading a book. She denied pain, anxiety and depression. Reports she feels tired today and thinks its due to her not sleeping as much as she typically does and from the Gabapentin she had earlier today. She took a nap earlier and said it helped. The patient was asked to zip her sweater as her tank top was not providing enough modesty for the common area-[facility]; the patient did follow though with request without complaining. Her goal for the evening is to journal. Her affect is blunted, mood is calm and speech is clearer. The patient attended the evening group. She was medication compliant.     Blood sugar this shift: 170 & refused hs blood sugar check  Prn's given this shift: None

## 2022-03-25 NOTE — PLAN OF CARE
Problem: Mood Impairment (Psychotic Signs/Symptoms)  Goal: Improved Mood Symptoms (Psychotic Signs/Symptoms)  Outcome: Ongoing, Progressing  Intervention: Optimize Emotion and Mood  Recent Flowsheet Documentation  Taken 3/25/2022 0905 by Anuja Reich RN  Diversional Activity:    journaling    television       Pt reported having some social anxiety and anxiety about whether her family is doing ok. Affect is blunted, flat but pleasant and cooperative upon approach. Mood is anxious. Pt was cooperative with medications and BG check this morning. She showered and spent time in the lounge, minimally social with peers, attended some groups. Pt stated that she feels restless and wants to be discharged home soon. No SI/SIB noted or observed. Will continue to monitor.

## 2022-03-25 NOTE — PLAN OF CARE
03/25/22 1422   Group Therapy Session   Group Attendance attended group session   Time Session Began 1315   Time Session Ended 1400   Total Time patient participated (minutes) 45   Total # Attendees 2   Group Type expressive therapy   Group Topic Covered problem-solving;coping skills/lifestyle management   Group Session Detail OT Clinic   Patient Response/Contribution cooperative with task;organized   Patient Response Detail See note   Pt actively participated in occupational therapy clinic to facilitate coping skill exploration, creative expression within personally meaningful activities, and clinical observation of social, cognitive, and kinesthetic performance skills. Pt response: Chosen activity: picture frame. Independent to initiate, gather materials, sequence and adjust to workspace demands as needed. Demonstrated good focus, planning, and problem solving for this moderately complex task. Able to ask for assistance and initiates interactions with peers and staff.

## 2022-03-26 LAB
GLUCOSE BLDC GLUCOMTR-MCNC: 107 MG/DL (ref 70–99)
GLUCOSE BLDC GLUCOMTR-MCNC: 125 MG/DL (ref 70–99)
GLUCOSE BLDC GLUCOMTR-MCNC: 144 MG/DL (ref 70–99)

## 2022-03-26 PROCEDURE — H2032 ACTIVITY THERAPY, PER 15 MIN: HCPCS

## 2022-03-26 PROCEDURE — 124N000002 HC R&B MH UMMC

## 2022-03-26 PROCEDURE — 250N000013 HC RX MED GY IP 250 OP 250 PS 637: Performed by: PHYSICIAN ASSISTANT

## 2022-03-26 PROCEDURE — 250N000013 HC RX MED GY IP 250 OP 250 PS 637: Performed by: PSYCHIATRY & NEUROLOGY

## 2022-03-26 PROCEDURE — 250N000013 HC RX MED GY IP 250 OP 250 PS 637: Performed by: NURSE PRACTITIONER

## 2022-03-26 RX ADMIN — METFORMIN HYDROCHLORIDE 1000 MG: 500 TABLET, EXTENDED RELEASE ORAL at 08:08

## 2022-03-26 RX ADMIN — GABAPENTIN 300 MG: 300 CAPSULE ORAL at 14:00

## 2022-03-26 RX ADMIN — OLANZAPINE 15 MG: 15 TABLET, FILM COATED ORAL at 20:59

## 2022-03-26 RX ADMIN — GABAPENTIN 300 MG: 300 CAPSULE ORAL at 08:08

## 2022-03-26 RX ADMIN — GABAPENTIN 300 MG: 300 CAPSULE ORAL at 20:58

## 2022-03-26 RX ADMIN — LITHIUM CARBONATE 600 MG: 300 TABLET, EXTENDED RELEASE ORAL at 08:08

## 2022-03-26 RX ADMIN — RISPERIDONE 1 MG: 1 TABLET ORAL at 20:59

## 2022-03-26 RX ADMIN — LITHIUM CARBONATE 750 MG: 300 TABLET, EXTENDED RELEASE ORAL at 20:58

## 2022-03-26 RX ADMIN — FLUTICASONE PROPIONATE 2 SPRAY: 50 SPRAY, METERED NASAL at 09:41

## 2022-03-26 RX ADMIN — LEVOTHYROXINE SODIUM 50 MCG: 25 TABLET ORAL at 08:08

## 2022-03-26 RX ADMIN — METFORMIN HYDROCHLORIDE 1000 MG: 500 TABLET, EXTENDED RELEASE ORAL at 17:42

## 2022-03-26 ASSESSMENT — ACTIVITIES OF DAILY LIVING (ADL)
DRESS: INDEPENDENT
DRESS: INDEPENDENT;SCRUBS (BEHAVIORAL HEALTH)
ORAL_HYGIENE: INDEPENDENT
LAUNDRY: WITH SUPERVISION
HYGIENE/GROOMING: INDEPENDENT
ORAL_HYGIENE: INDEPENDENT
HYGIENE/GROOMING: INDEPENDENT
LAUNDRY: WITH SUPERVISION

## 2022-03-26 NOTE — PROGRESS NOTES
NOC Shift Report    Pt in bed at beginning of shift, breathing quiet and unlabored. Pt slept through shift. Pt slept 6.5 hrs. Pt up once sat in the lounge for a little while, went back to bed. No pt complaints or concerns at this time.  Will continue to monitor.

## 2022-03-26 NOTE — PLAN OF CARE
Goal Outcome Evaluation:    Plan of Care Reviewed With: patient           The patient said she is looking forward to discharging and misses spending time with her daughter. Said she took two naps today and that helped her pass the time. Sat in the dinning room and played monopoly with a peer. Denied pain, anxiety and depression. Reported no SI or hallucinations and agreed to tell staff if she has any thoughts of hurting herself. Her affect is blunted and mood is calm. She had a visitor and he dropped off hygiene items (placed in locker) and clothing. The visit appeared to go well. She attended the evening group. Was compliant with scheduled medications. After group asked to talk with this writer. She talked about genetics and how marriages with her offspring may affect her life. She also talked about wanting to be self-sufficient. She had tangential speech during this time. The patient was encouraged to do calming activities as she seemed to get more upset the longer she talked.     Prn's given this shift: None    Blood sugars this shift: 125 & 107

## 2022-03-26 NOTE — PROGRESS NOTES
03/25/22 2100   Group Therapy Session   Group Attendance attended group session   Time Session Began 2010   Time Session Ended 2100   Total Time patient participated (minutes) 50   Total # Attendees 3   Group Type psychotherapeutic   Group Topic Covered coping skills/lifestyle management;emotions/expression;leisure exploration/use of leisure time;self-care activities   Group Session Detail   (Map of goals, values and self care skills)   Patient Response/Contribution able to recall/repeat info presented;cooperative with task;discussed personal experience with topic;unable to interrupt patient   Pt reported feeling ok, she was a bit less perseverative and tangential. She did an art piece in the shape of a flower with the center being gratitude and the petals, various aspects of her life and relationships she wants to work on. She also wrote about the process, journaled 2 full pages of text about her goals and all the things she is grateful for. Maddy did a very thoughtful job with the task.

## 2022-03-26 NOTE — PLAN OF CARE
"Goal Outcome Evaluation:    Plan of Care Reviewed With: patient      Maddy was up ad mauricio, spent roughly 50% of the shift in her room resting/reading/napping. Pt requested self reflection worksheets and journaled her answers. Pt denied having suicidal ideation or self harm thoughts. Maddy denied feeling anxious or depressed; she did say \"I feel bored, I want to do things that are more challenging to me\". Maddy was withdrawn from peers and staff, her affect was flat thought she brightened on approach.     Maddy's food and fluid intake were WNL, Pt was med adherent.            "

## 2022-03-27 LAB
GLUCOSE BLDC GLUCOMTR-MCNC: 158 MG/DL (ref 70–99)
GLUCOSE BLDC GLUCOMTR-MCNC: 158 MG/DL (ref 70–99)
GLUCOSE BLDC GLUCOMTR-MCNC: 229 MG/DL (ref 70–99)

## 2022-03-27 PROCEDURE — 250N000013 HC RX MED GY IP 250 OP 250 PS 637: Performed by: PHYSICIAN ASSISTANT

## 2022-03-27 PROCEDURE — 124N000002 HC R&B MH UMMC

## 2022-03-27 PROCEDURE — 250N000013 HC RX MED GY IP 250 OP 250 PS 637: Performed by: PSYCHIATRY & NEUROLOGY

## 2022-03-27 PROCEDURE — 250N000013 HC RX MED GY IP 250 OP 250 PS 637: Performed by: NURSE PRACTITIONER

## 2022-03-27 RX ADMIN — GABAPENTIN 300 MG: 300 CAPSULE ORAL at 19:56

## 2022-03-27 RX ADMIN — ACETAMINOPHEN 650 MG: 325 TABLET, FILM COATED ORAL at 05:14

## 2022-03-27 RX ADMIN — SENNOSIDES AND DOCUSATE SODIUM 1 TABLET: 50; 8.6 TABLET ORAL at 13:26

## 2022-03-27 RX ADMIN — FLUTICASONE PROPIONATE 2 SPRAY: 50 SPRAY, METERED NASAL at 09:36

## 2022-03-27 RX ADMIN — METFORMIN HYDROCHLORIDE 1000 MG: 500 TABLET, EXTENDED RELEASE ORAL at 09:04

## 2022-03-27 RX ADMIN — SENNOSIDES AND DOCUSATE SODIUM 1 TABLET: 50; 8.6 TABLET ORAL at 20:34

## 2022-03-27 RX ADMIN — GABAPENTIN 300 MG: 300 CAPSULE ORAL at 09:04

## 2022-03-27 RX ADMIN — ALBUTEROL SULFATE 2 PUFF: 90 AEROSOL, METERED RESPIRATORY (INHALATION) at 09:36

## 2022-03-27 RX ADMIN — LITHIUM CARBONATE 600 MG: 300 TABLET, EXTENDED RELEASE ORAL at 09:04

## 2022-03-27 RX ADMIN — GABAPENTIN 300 MG: 300 CAPSULE ORAL at 13:20

## 2022-03-27 RX ADMIN — LITHIUM CARBONATE 750 MG: 300 TABLET, EXTENDED RELEASE ORAL at 19:56

## 2022-03-27 RX ADMIN — METFORMIN HYDROCHLORIDE 1000 MG: 500 TABLET, EXTENDED RELEASE ORAL at 17:46

## 2022-03-27 RX ADMIN — LEVOTHYROXINE SODIUM 50 MCG: 25 TABLET ORAL at 09:04

## 2022-03-27 RX ADMIN — OLANZAPINE 20 MG: 20 TABLET, FILM COATED ORAL at 19:56

## 2022-03-27 ASSESSMENT — ACTIVITIES OF DAILY LIVING (ADL)
HYGIENE/GROOMING: INDEPENDENT
LAUNDRY: WITH SUPERVISION
DRESS: INDEPENDENT
LAUNDRY: WITH SUPERVISION
ORAL_HYGIENE: INDEPENDENT
HYGIENE/GROOMING: INDEPENDENT
ORAL_HYGIENE: INDEPENDENT
DRESS: INDEPENDENT

## 2022-03-27 NOTE — PLAN OF CARE
Goal Outcome Evaluation:    Plan of Care Reviewed With: patient        The patient spent time napping, sitting in the lounge journling and taking on the phone. Her affect is blunted and her mood is calm. The patient talked about the ideas she has been journling. Said she would like to have mental health worksheets that addresses her mood and wellbeing also daily coloring pages. The patient had good insight into mental health coping strategies. Her speech was easier to follow although she occasionally rambles. She reported having a bowel movement today and is concerned she has not fully evacuated her bowels. Reviewed ways to help prevent/alleviate constipation such as drinking water, eating high fiber food and exercise as well as utilizing prn medications. Was compliant with scheduled medications.    Blood sugars this shift: 229 & 158    Prn's given this shift: Senna

## 2022-03-27 NOTE — PLAN OF CARE
"Goal Outcome Evaluation:    Plan of Care Reviewed With: patient      Maddy was up ad mauricio, was somewhat drowsy until 08:45, then came out for breakfast and medications afterward. Pt has been interactive with select peers, playing [employer] and card games. Pt's appetite and fluid intake were WNL. Pt denied having suicidal ideation or self harm thoughts.     Maddy was med adherent. After lunch, Maddy reported feeling bloated \"I feel uncomfortable\"  writer inquired if Pt has had regular bowel movements and when her last one occurred. Pt said Maybe today though didn't give a definitive answer. Pt requested and was given PRN Senna and was encouraged to report to staff later today if she had any results or to let us know if she became more uncomfortable. Maddy also indicated she wanted to speak with her Provider about her prescribed lithium and if it could cause her to feel bloated.     Pt periodically engaged in rambling conversations with writer and other staff. Maddy was pleasant on approach, cooperative.            "

## 2022-03-27 NOTE — PROGRESS NOTES
"   03/26/22 2100   Group Therapy Session   Group Attendance attended group session   Time Session Began 2000   Time Session Ended 2050   Total Time patient participated (minutes) 40   Total # Attendees 1   Group Type recreation   Group Topic Covered relaxation techniques   Group Session Detail Stress Reduction   Patient Response/Contribution cooperative with task   Patient Response Detail Pt attended the structured Therapeutic Recreation group with a focus on leisure participation, socializing, and exercise. Pt participated in the guided exercise for the full duration of the group. Pt followed along, engaged in the guided chair exercise routine and added to the discussion prompts throughout the routine. Pt often gave very lengthy responses and occasionally tangential. Pt often referred to her \"ideas\" and others not agreeing with her and she doesn't understand why they disagree with her. Many of the responses drifted to talking about her \"ideas\". Pt was encouraged to use positive imagery with the deep breathing and stretching to foster relaxation, improves focus, and reduce stress.     "

## 2022-03-27 NOTE — PLAN OF CARE
Goal Outcome Evaluation:    Pt a wake x1 briefly otherwise appeared to be a sleep @ all safety checks.  Pt ate a snack when she woke up in the morning.  Pt also c/o headache.  Tylenol 650 mg po was administered per request with good results. Pt went back to sleep this morning shortly after administration.

## 2022-03-28 LAB
GLUCOSE BLDC GLUCOMTR-MCNC: 141 MG/DL (ref 70–99)
GLUCOSE BLDC GLUCOMTR-MCNC: 160 MG/DL (ref 70–99)

## 2022-03-28 PROCEDURE — 124N000002 HC R&B MH UMMC

## 2022-03-28 PROCEDURE — 250N000013 HC RX MED GY IP 250 OP 250 PS 637: Performed by: PHYSICIAN ASSISTANT

## 2022-03-28 PROCEDURE — 250N000013 HC RX MED GY IP 250 OP 250 PS 637: Performed by: NURSE PRACTITIONER

## 2022-03-28 PROCEDURE — 250N000013 HC RX MED GY IP 250 OP 250 PS 637: Performed by: PSYCHIATRY & NEUROLOGY

## 2022-03-28 PROCEDURE — G0177 OPPS/PHP; TRAIN & EDUC SERV: HCPCS

## 2022-03-28 PROCEDURE — 99232 SBSQ HOSP IP/OBS MODERATE 35: CPT | Performed by: NURSE PRACTITIONER

## 2022-03-28 RX ADMIN — OLANZAPINE 20 MG: 20 TABLET, FILM COATED ORAL at 19:18

## 2022-03-28 RX ADMIN — LITHIUM CARBONATE 750 MG: 300 TABLET, EXTENDED RELEASE ORAL at 21:10

## 2022-03-28 RX ADMIN — METFORMIN HYDROCHLORIDE 1000 MG: 500 TABLET, EXTENDED RELEASE ORAL at 08:50

## 2022-03-28 RX ADMIN — LEVOTHYROXINE SODIUM 50 MCG: 25 TABLET ORAL at 08:50

## 2022-03-28 RX ADMIN — Medication 10 MG: at 21:15

## 2022-03-28 RX ADMIN — GABAPENTIN 300 MG: 300 CAPSULE ORAL at 08:50

## 2022-03-28 RX ADMIN — METFORMIN HYDROCHLORIDE 1000 MG: 500 TABLET, EXTENDED RELEASE ORAL at 18:03

## 2022-03-28 RX ADMIN — ALBUTEROL SULFATE 2 PUFF: 90 AEROSOL, METERED RESPIRATORY (INHALATION) at 08:55

## 2022-03-28 RX ADMIN — GABAPENTIN 300 MG: 300 CAPSULE ORAL at 19:18

## 2022-03-28 RX ADMIN — LITHIUM CARBONATE 600 MG: 300 TABLET, EXTENDED RELEASE ORAL at 08:51

## 2022-03-28 RX ADMIN — GABAPENTIN 300 MG: 300 CAPSULE ORAL at 14:37

## 2022-03-28 RX ADMIN — FLUTICASONE PROPIONATE 2 SPRAY: 50 SPRAY, METERED NASAL at 08:54

## 2022-03-28 NOTE — PROGRESS NOTES
"[facility], [place]   Psychiatric Progress Note        Interim History:     The patient's care was discussed with the treatment team during the daily team meeting and staff's chart notes were reviewed.  Pt was documented as sleeping 6.5 - 7 hours during the weekend overnight shifts.  She has been spending time attending groups, journaling, riding the stationary bike and playing games.  Her attention during these activities has been variable.  At times she has been disorganized with rambling speech, other times fairly well focused.  She has been using PRNs of Tylenol, Senokot and Albuterol.  Pt states that she feels \"bloated,\" which she attributes to Lithium, but has been having 2-3 bowel movements daily.  States she is anxious because she would like to discharge home.  Pt continues to have some grandiose delusions regarding her ability to collaborate with professionals to design a contraceptive device that sorts DNA.  She has some insight into the fact that others deems this grandiose.  States that she has no concerns returning to her apartment, and if the carpet smell is unbearable, she will live with her father.  She made eye contact on several occasions during the conversation.           Medications:       fluticasone  2 spray Both Nostrils Daily     gabapentin  300 mg Oral TID     levothyroxine  50 mcg Oral QAM AC     lithium ER  600 mg Oral QAM     lithium ER  750 mg Oral At Bedtime     metFORMIN  1,000 mg Oral BID w/meals     OLANZapine  20 mg Oral QPM     risperiDONE microspheres ER  37.5 mg Intramuscular Q14 Days          Allergies:     Allergies   Allergen Reactions     Dust Mites Shortness Of Breath     Animal Dander      Other reaction(s): *Unknown     Metformin Fatigue     Patient is taking at home as of 3/3/22     Mold      Other reaction(s): Runny Nose     Trees           Labs:     Recent Results (from the past 24 hour(s))   Glucose by meter    Collection Time: 03/27/22  " "5:42 PM   Result Value Ref Range    GLUCOSE BY METER POCT 229 (H) 70 - 99 mg/dL   Glucose by meter    Collection Time: 03/27/22  8:04 PM   Result Value Ref Range    GLUCOSE BY METER POCT 158 (H) 70 - 99 mg/dL   Glucose by meter    Collection Time: 03/28/22  8:28 AM   Result Value Ref Range    GLUCOSE BY METER POCT 141 (H) 70 - 99 mg/dL          Psychiatric Examination:     /70 (BP Location: Left arm, Patient Position: Sitting)   Pulse 68   Temp 97.9  F (36.6  C) (Oral)   Resp 16   Ht 1.626 m (5' 4\")   Wt 114.9 kg (253 lb 3.2 oz)   SpO2 95%   BMI 43.46 kg/m    Weight is 253 lbs 3.2 oz  Body mass index is 43.46 kg/m .    Orthostatic Vitals  Report      Most Recent      Standing Orthostatic /76 03/15 0759    Standing Orthostatic Pulse (bpm) 118 03/15 0759         Appearance:  alert, dressed in hospital scrubs, fair grooming/hygiene  Attitude:  cooperative  Eye Contact:  minimal but improved compared to admission  Mood:  anxious at times, less irritable  Affect:  more range, occasional smiling  Speech:  clear, coherent and normal prosody, less hyperverbal/rambling  Language: fluent and intact in English  Psychomotor, Gait, Musculoskeletal:  no evidence of tardive dyskinesia, dystonia, or tics  Thought Process:  better organized, compared to admission  Associations: no looseness of associations  Thought Content:  no evidence of suicidal ideation or homicidal ideation and denies hallucinations, paranoid/delusional thought content is present but improved  Insight:  fair, improved   Judgement:  fair, improved  Oriented to:  time, date, person, and place  Attention Span and Concentration:  variable, improved overall  Recent and Remote Memory:  fair  Fund of Knowledge: average      Clinical Global Impressions  First:  Considering your total clinical experience with this particular patient population, how severe are the patient's symptoms at this time?: 7 (03/04/22 5180)  Compared to the patient's condition " at the START of treatment, this patient's condition is: 4 (03/04/22 1628)  Most recent:  Considering your total clinical experience with this particular patient population, how severe are the patient's symptoms at this time?: 6 (03/24/22 0421)  Compared to the patient's condition at the START of treatment, this patient's condition is: 3 (03/24/22 0421)         Precautions:     Behavioral Orders   Procedures     Code 1 - Restrict to Unit     Routine Programming     As clinically indicated     Status 15     Every 15 minutes.          Diagnoses:     Schizoaffective disorder, bipolar type, with multiple episodes, currently in acute episode with mixed mood state and active psychosis symptoms  Generalized anxiety disorder   Diabetes mellitus, type 2  Hypothyroidism  Asthma   Chronic leukocytosis          Plan:     Medications:  Continue Neurontin 300 mg TID.  Continue Lithium 600 mg in the AM and 750 mg at HS (level 0.9).  Continue Zyprexa 20 mg at HS.  Risperdal Consta 25 mg was initiated on 3/10 and 37.5 mg was administered on 3/24.  She is interested in Zyprexa Relprevv but aware that this is not on the hospital formulary, nor is it available through most agencies in the community.  Continue PRNs of Cogentin, Hydroxyzine, Melatonin and Zyprexa.    Plan for discharge to her apartment this week.  She has outpatient psychiatry, ILS and a home care RN.          PHILL Barahona, [facility]

## 2022-03-28 NOTE — PLAN OF CARE
03/28/22 1413   Group Therapy Session   Group Attendance attended group session   Time Session Began 1115   Time Session Ended 1205   Total Time patient participated (minutes) 50   Total # Attendees 3   Group Type expressive therapy   Group Topic Covered balanced lifestyle   Patient Response/Contribution   (Preoccupied; tangential)   Patient Response Detail Pt participated in OT clinic this a.m., where she initiated a chosen project (journaling) and followed through with plan. Openly discussed her hopes of creating a pregnancy-related invention that uses genetic testing. Comments were loose and odd. Pt appeared concerned about the likelihood of feedback from others that her ideas are delusional.

## 2022-03-28 NOTE — PLAN OF CARE
03/28/22 1339   Individualization/Patient Specific Goals   Patient Personal Strengths family/social support   Patient Vulnerabilities recent loss   Interprofessional Rounds   Participants advanced practice nurse;nursing;psychiatrist;CTC   Team Discussion   Participants Provider- Mary Reeves, DYANE, Nursing- Macy Lei, RN- Clinical Treatment Coordinator- Lauren Landry [facility], and OT- Corbin Jaramillo   Progress Pt engaes in delusional thinking and paranoia. She remains disorganized and medication adjustments are being made. She requires ongoing stabilization at this time.   Anticipated length of stay 1-2 weeks   Continued Stay Criteria/Rationale symptoms reduced and requires medication mangement surrrounding RC injection prior to discharge   Medical/Physical 1) Diabetes Type 2-Hgb A1c 8.4 on 1/8/22-Patient does not take home FSBG-Continue PTA Metformin-FSBG BID-Nutrition consult 2) Asthma-Pt denies concerns but has nasal congestion and pharyngitis. -Monitor for worsening asthma symptoms -PTA albuterol inhaler 3) Hernia -CT abdomen shows mandeep umbilical hernia -Evaluated in ED, please see ED provider note for details-Pain mgmt with tylenol-Monitor for worsening 4) Hypothyroidism-TSH 6.22 and T4 1.16 on 3/4/22-Patient inconsistant with medication -Continue home Synthroid 25 mcg 5) Possible UTI-UA 3/3/21 suspicious for UTI-Patient asymptomatic- culture results show mixed urogenital spencer 6) Leukocytosis-Elevated WBC on 3/3 and 3/4 (12.1 and 12.9 respectively)-Possibly related to Lithium use-Repeat CBC 3/5 -IM consulted 7) URI-Nasal congestion and pharyngitis-Strep negative-Influenza pending-Supportive cares   Plan Discharge home with out patient services. Pt has CADI Waiver services. OP providers recommend ACT team but pt does not want ACT team   Rationale for change in precautions or plan No change   Anticipated Discharge Disposition home with family   PRECAUTIONS AND SAFETY    Behavioral Orders    Procedures    Code 1 - Restrict to Unit    Routine Programming     As clinically indicated    Status 15     Every 15 minutes.       Safety  Safety WDL: WDL  Patient Location: dining room, hallway, patient room, own  Observed Behavior: calm  Observed Behavior (Comment): Playing cards and board game with peers.  Safety Measures: safety rounds completed  Diversional Activity: journaling  De-Escalation Techniques: appropriate behavior reinforced  Suicidality: Status 15
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
## 2022-03-28 NOTE — PLAN OF CARE
"Problem: Anxiety  Goal: Anxiety Reduction or Resolution  Outcome: Ongoing, Progressing     Problem: Mood Impairment (Psychotic Signs/Symptoms)  Goal: Improved Mood Symptoms (Psychotic Signs/Symptoms)  Outcome: Ongoing, Progressing      Goal Outcome Evaluation:    Plan of Care Reviewed With: patient     Pt observed out in the milieu, watching television and playing board games with peer. Presents with blunted affect, in a calm mood. Pt still makes odd and delusional statements. Verbalized \"I have to take care of everybody, every single person in the world you know\". Pt did not elaborate further when asked what she meant. Described her day as \"I feel ok today. Day is ok\". Pt denies SI/SIB/HI. She did not demonstrate any self injurious behavior. Pt denies experiencing any type of hallucinations. Denies having anxiety and depression. Pt expressed that she has been journaling and attending groups as means of coping while in the unit.   Pt attended group this shift.     Medical Concerns: DM type II. BG: 160 mg/dl. Complained of \"slight aching\" of her lower back and bilateral shoulders. Rated pain 2/10. Cold pack applied per her request. Reassessed, pt verbalized relief.   Appetite: Consumed 100% of share of dinner and took approximately 500 ml of fluids.   Sleep: pt denies concerns  LBM: 3/28/2022  ADLs: Independent  PRNs given: PRN Melatonin to aid for sleep given per request at 2115H.   Due medications given. Denies experiencing side effects.    Needs attended to. Staff will continue to monitor. No further concerns noted as of this time.                "

## 2022-03-28 NOTE — PROGRESS NOTES
NOC Shift Report    Pt in bed at beginning of shift, breathing quiet and unlabored. Pt appears to be sleeping the whole night.No pt complaints or concerns at this time. No PRNs given. Will continue to monitor.

## 2022-03-28 NOTE — PLAN OF CARE
Goal Outcome Evaluation:        Pt.  willing to engage in a 1:1 with staff.  Pt. states that she hopes to be discharged soon.  The pt. states that she is feeling better overall; however the  pt. states that she is tired about being in the hospital.  The pt. denies suicidal ideation, self-injurious behavior, auditory or visual hallucinations.  The pt. Does state that she is concerned for the safety of her extended family which includes cousins, other family members that live all over the country.  The pt. seems to believe that she has some responsibility for keeping all her extended family safe.  The pt. Continues to have poor eye contact when engaging with staff.  The pt. Is working on some short term and long term goals. Pt. Currently has no questions or requests.

## 2022-03-29 LAB
GLUCOSE BLDC GLUCOMTR-MCNC: 124 MG/DL (ref 70–99)
GLUCOSE BLDC GLUCOMTR-MCNC: 170 MG/DL (ref 70–99)
GLUCOSE BLDC GLUCOMTR-MCNC: 185 MG/DL (ref 70–99)
SARS-COV-2 RNA RESP QL NAA+PROBE: NEGATIVE

## 2022-03-29 PROCEDURE — G0177 OPPS/PHP; TRAIN & EDUC SERV: HCPCS

## 2022-03-29 PROCEDURE — 250N000013 HC RX MED GY IP 250 OP 250 PS 637: Performed by: PSYCHIATRY & NEUROLOGY

## 2022-03-29 PROCEDURE — 250N000013 HC RX MED GY IP 250 OP 250 PS 637: Performed by: PHYSICIAN ASSISTANT

## 2022-03-29 PROCEDURE — 99232 SBSQ HOSP IP/OBS MODERATE 35: CPT | Performed by: NURSE PRACTITIONER

## 2022-03-29 PROCEDURE — 87635 SARS-COV-2 COVID-19 AMP PRB: CPT | Performed by: NURSE PRACTITIONER

## 2022-03-29 PROCEDURE — 124N000002 HC R&B MH UMMC

## 2022-03-29 PROCEDURE — 90853 GROUP PSYCHOTHERAPY: CPT

## 2022-03-29 PROCEDURE — 250N000013 HC RX MED GY IP 250 OP 250 PS 637: Performed by: NURSE PRACTITIONER

## 2022-03-29 RX ORDER — METFORMIN HYDROCHLORIDE EXTENDED-RELEASE TABLETS 1000 MG/1
1000 TABLET, FILM COATED, EXTENDED RELEASE ORAL 2 TIMES DAILY WITH MEALS
Qty: 60 TABLET | Refills: 1 | Status: SHIPPED | OUTPATIENT
Start: 2022-03-29 | End: 2022-03-30

## 2022-03-29 RX ORDER — PHENOL 1.4 %
10 AEROSOL, SPRAY (ML) MUCOUS MEMBRANE
Qty: 30 TABLET | Refills: 1 | Status: ON HOLD | OUTPATIENT
Start: 2022-03-29 | End: 2022-06-22

## 2022-03-29 RX ORDER — LITHIUM CARBONATE 300 MG/1
600 TABLET, FILM COATED, EXTENDED RELEASE ORAL EVERY MORNING
Qty: 90 TABLET | Refills: 1 | Status: ON HOLD | OUTPATIENT
Start: 2022-03-30 | End: 2022-06-18

## 2022-03-29 RX ORDER — OLANZAPINE 20 MG/1
20 TABLET ORAL EVERY EVENING
Qty: 30 TABLET | Refills: 1 | Status: SHIPPED | OUTPATIENT
Start: 2022-03-29 | End: 2022-05-19

## 2022-03-29 RX ORDER — LEVOTHYROXINE SODIUM 50 UG/1
50 TABLET ORAL
Qty: 30 TABLET | Refills: 1 | Status: SHIPPED | OUTPATIENT
Start: 2022-03-30 | End: 2022-06-07

## 2022-03-29 RX ORDER — LITHIUM CARBONATE 450 MG
TABLET, EXTENDED RELEASE ORAL
Qty: 30 TABLET | Refills: 1 | Status: ON HOLD | OUTPATIENT
Start: 2022-03-29 | End: 2022-06-22

## 2022-03-29 RX ORDER — PRENATAL VIT 91/IRON/FOLIC/DHA 28-975-200
COMBINATION PACKAGE (EA) ORAL 2 TIMES DAILY PRN
Qty: 30 G | Refills: 1 | Status: SHIPPED | OUTPATIENT
Start: 2022-03-29 | End: 2022-08-30

## 2022-03-29 RX ORDER — HYDROXYZINE HYDROCHLORIDE 25 MG/1
25 TABLET, FILM COATED ORAL EVERY 4 HOURS PRN
Qty: 60 TABLET | Refills: 1 | Status: SHIPPED | OUTPATIENT
Start: 2022-03-29 | End: 2022-05-19

## 2022-03-29 RX ORDER — RISPERIDONE 37.5MG/2ML
37.5 KIT INTRAMUSCULAR
Qty: 1 EACH | Refills: 1 | Status: ON HOLD | OUTPATIENT
Start: 2022-04-07 | End: 2022-06-18

## 2022-03-29 RX ORDER — GABAPENTIN 300 MG/1
300 CAPSULE ORAL 3 TIMES DAILY
Qty: 90 CAPSULE | Refills: 1 | Status: SHIPPED | OUTPATIENT
Start: 2022-03-29 | End: 2022-04-06

## 2022-03-29 RX ORDER — CETIRIZINE HYDROCHLORIDE 10 MG/1
10 TABLET ORAL
Qty: 30 TABLET | Refills: 1 | Status: SHIPPED | OUTPATIENT
Start: 2022-03-29 | End: 2022-04-13

## 2022-03-29 RX ORDER — FLUTICASONE PROPIONATE 50 MCG
2 SPRAY, SUSPENSION (ML) NASAL DAILY
Qty: 15.8 ML | Refills: 1 | Status: SHIPPED | OUTPATIENT
Start: 2022-03-30 | End: 2022-09-12

## 2022-03-29 RX ORDER — ACETAMINOPHEN 325 MG/1
650 TABLET ORAL EVERY 4 HOURS PRN
Start: 2022-03-29

## 2022-03-29 RX ORDER — ALBUTEROL SULFATE 90 UG/1
2 AEROSOL, METERED RESPIRATORY (INHALATION) EVERY 4 HOURS PRN
Qty: 18 G | Refills: 1 | Status: SHIPPED | OUTPATIENT
Start: 2022-03-29 | End: 2023-07-19

## 2022-03-29 RX ADMIN — METFORMIN HYDROCHLORIDE 1000 MG: 500 TABLET, EXTENDED RELEASE ORAL at 08:12

## 2022-03-29 RX ADMIN — METFORMIN HYDROCHLORIDE 1000 MG: 500 TABLET, EXTENDED RELEASE ORAL at 18:00

## 2022-03-29 RX ADMIN — GABAPENTIN 300 MG: 300 CAPSULE ORAL at 14:00

## 2022-03-29 RX ADMIN — OLANZAPINE 20 MG: 20 TABLET, FILM COATED ORAL at 21:32

## 2022-03-29 RX ADMIN — LITHIUM CARBONATE 600 MG: 300 TABLET, EXTENDED RELEASE ORAL at 08:12

## 2022-03-29 RX ADMIN — FLUTICASONE PROPIONATE 2 SPRAY: 50 SPRAY, METERED NASAL at 08:12

## 2022-03-29 RX ADMIN — LITHIUM CARBONATE 750 MG: 300 TABLET, EXTENDED RELEASE ORAL at 21:31

## 2022-03-29 RX ADMIN — ALBUTEROL SULFATE 2 PUFF: 90 AEROSOL, METERED RESPIRATORY (INHALATION) at 14:10

## 2022-03-29 RX ADMIN — LEVOTHYROXINE SODIUM 50 MCG: 25 TABLET ORAL at 08:12

## 2022-03-29 RX ADMIN — GABAPENTIN 300 MG: 300 CAPSULE ORAL at 21:31

## 2022-03-29 RX ADMIN — GABAPENTIN 300 MG: 300 CAPSULE ORAL at 08:12

## 2022-03-29 ASSESSMENT — ACTIVITIES OF DAILY LIVING (ADL)
LAUNDRY: WITH SUPERVISION
HYGIENE/GROOMING: INDEPENDENT
DRESS: INDEPENDENT
LAUNDRY: WITH SUPERVISION
HYGIENE/GROOMING: INDEPENDENT
ORAL_HYGIENE: INDEPENDENT
ORAL_HYGIENE: INDEPENDENT
DRESS: INDEPENDENT

## 2022-03-29 NOTE — PLAN OF CARE
OT GROUP NOTE:       03/29/22 1252   Group Therapy Session   Group Attendance attended group session   Time Session Began 0915   Time Session Ended 1015   Total Time patient participated (minutes) 50   Total # Attendees 3   Group Type expressive therapy   Group Topic Covered emotions/expression;coping skills/lifestyle management   Group Session Detail Creative Expression using crafts, executive skills and discharge planning.   Patient Response/Contribution cooperative with task;discussed personal experience with topic;requested more information about topic   Patient Response Detail Initiated group. Focused on d/c planning. Unable to identify areas of progress and/or what is needed to be successful post d/c. Noted she is feeling tired but, wants to be productive and get back to a routine. Unable to discuss what the routine entailed. Discussed need to make appointments, encouraged to speak with  to ensure there weren't double bookings. Discussed /encouraged to develop a general schedule to ensure follow through post d/c. Talked of he desire to take online courses.

## 2022-03-29 NOTE — PLAN OF CARE
Tasks Complete:    CTC left vm and email for Dayana, pt's in home nurse regarding medication management. In addition, CTC left email and vm for CADI waiver Cm to assist with coordination  Direct Line: [phone] Main Office: [phone]  Fax: [phone]    CTC spoke with the director of in home nursing who suggested for provider to send 30 day script to [facility]. CTC discussed pt's barrier to upkeep her RC injection. [facility] requested for ILS staff to work with Maddy on the day of medication delivery to ensure that Maddy stores her medication properly. In addition, nursing will follow up with weekly visits and provide injections biweekly. Med machine has been ordered for this pt per CADI CM.     Provider should sent script to [facility] - Fax [phone] [phone]. Request for delivery format to pt's home.    Follow up appointment scheduled with OP psych for April 20th 930am via video   Follow up appointment scheduled with OP therapist for April 1 at 10:00 am via video    AVS completed     CTC met with pt to discuss discharge planning.      Post round meeting with team @9:30 am updates: Team discussed discharge planning. CTC has contacted nursing agency who confirmed that they are able to give RC injections . Team is waiting for nursing agency to confirm pharmacy, procedure of RC injection, etc.       Current Symptoms include the following:   The pt. denies suicidal ideation, self-injurious behavior, auditory or visual hallucinations     Addressed patient needs/concerns:   Pt did not note any concerns/needs at this time       Discharge Plan or Goal   Plan  Discharge home with CADI Waiver services. Pt would benefit from rep payee and additional cadi waiver services. PT would benefit from ACT team however has declined and would like to continue with CADI Waiver services.      SN will provide RC injection  Pharmacy: [facility]  [employer] will work with pt on day of  medication delivery to assist with storing RC injection     CADI WILL REMAIN OPEN PAST 30 days of hospital admission     Care Team     CADI Waiver CM- Shira MedinaRyt[place]vo[place]- [phone]     Therapist- Callie Manley LP-[phone]    Psychiatrist-Shirley Gooden,NP- [facility]-     Financial Worker- Elvira Bae- [facility]- [phone]    Outpatient DBT Group through [employer] ( [place])        Barriers to Discharge   Ongoing stabilization     Referral Status  No referrals needed at this time     Legal Status  Pt is voluntary

## 2022-03-29 NOTE — PLAN OF CARE
Pt appeared to sleep 4.5 hours. Pt up to lounge for water, sleeping with light on and door open and heard making loud noises intermittently. No concerns reported. Safety checks done at least every 15 minutes.

## 2022-03-29 NOTE — PROGRESS NOTES
"[facility], [place]   Psychiatric Progress Note        Interim History:     The patient's care was discussed with the treatment team during the daily team meeting and staff's chart notes were reviewed.  Pt was documented as sleeping 4.5 hours during the overnight shift, less than she had been the previous few nights.  She took PRN Albuterol x 1 and PRN Melatonin x 1 yesterday.  She has been attending groups.  Attention and level of disorganization remain variable throughout the day.  Pt reports her mood is \"alright.\"  She does feel tired after sleeping poorly last night.  She has been waking a couple time to urinate, generally, but is unsure why she had difficulty sleeping last night.  Pt was more concise and less disorganized during today's conversation.  States she is \"bored\" in the hospital and eager to discharge. Discussed that coordinating Risperdal Consta injection is currently a barrier to discharge.  She offered to assist with this but was advised not to make any calls in order to avoid redundancy/confusion as [facility] has been working on this.           Medications:       fluticasone  2 spray Both Nostrils Daily     gabapentin  300 mg Oral TID     levothyroxine  50 mcg Oral QAM AC     lithium ER  600 mg Oral QAM     lithium ER  750 mg Oral At Bedtime     metFORMIN  1,000 mg Oral BID w/meals     OLANZapine  20 mg Oral QPM     risperiDONE microspheres ER  37.5 mg Intramuscular Q14 Days          Allergies:     Allergies   Allergen Reactions     Dust Mites Shortness Of Breath     Animal Dander      Other reaction(s): *Unknown     Metformin Fatigue     Patient is taking at home as of 3/3/22     Mold      Other reaction(s): Runny Nose     Trees           Labs:     Recent Results (from the past 24 hour(s))   Glucose by meter    Collection Time: 03/28/22  5:06 PM   Result Value Ref Range    GLUCOSE BY METER POCT 160 (H) 70 - 99 mg/dL   Glucose by meter    Collection Time: 03/29/22  8:08 AM " "  Result Value Ref Range    GLUCOSE BY METER POCT 124 (H) 70 - 99 mg/dL          Psychiatric Examination:     /80 (BP Location: Left arm, Patient Position: Sitting, Cuff Size: Adult Regular)   Pulse 93   Temp 97.6  F (36.4  C) (Oral)   Resp 16   Ht 1.626 m (5' 4\")   Wt 107 kg (236 lb)   SpO2 93%   BMI 40.51 kg/m    Weight is 236 lbs 0 oz  Body mass index is 40.51 kg/m .    Orthostatic Vitals  Report      Most Recent      Standing Orthostatic /76 03/15 0759    Standing Orthostatic Pulse (bpm) 118 03/15 0759         Appearance:  alert, dressed in hospital scrubs, fair grooming/hygiene  Attitude:  cooperative  Eye Contact:  minimal but improved compared to admission  Mood:  anxious at times, \"bored\"  Affect:  more range compared to admission, occasional smiling  Speech:  clear, coherent and normal prosody, less hyperverbal/rambling  Language: fluent and intact in English  Psychomotor, Gait, Musculoskeletal:  no evidence of tardive dyskinesia, dystonia, or tics  Thought Process:  better organized, compared to admission  Associations: no looseness of associations  Thought Content:  no evidence of suicidal ideation or homicidal ideation and denies hallucinations, paranoid/delusional thought content is present but reduced compared to admission  Insight:  fair, improved   Judgement:  fair, improved  Oriented to:  time, date, person, and place  Attention Span and Concentration:  variable, improved overall  Recent and Remote Memory:  fair  Fund of Knowledge: average      Clinical Global Impressions  First:  Considering your total clinical experience with this particular patient population, how severe are the patient's symptoms at this time?: 7 (03/04/22 1628)  Compared to the patient's condition at the START of treatment, this patient's condition is: 4 (03/04/22 1628)  Most recent:  Considering your total clinical experience with this particular patient population, how severe are the patient's symptoms at " this time?: 6 (03/24/22 0421)  Compared to the patient's condition at the START of treatment, this patient's condition is: 3 (03/24/22 0421)         Precautions:     Behavioral Orders   Procedures     Code 1 - Restrict to Unit     Routine Programming     As clinically indicated     Status 15     Every 15 minutes.          Diagnoses:     Schizoaffective disorder, bipolar type, with multiple episodes, currently in acute episode with mixed mood state and active psychosis symptoms  Generalized anxiety disorder   Diabetes mellitus, type 2  Hypothyroidism  Asthma   Chronic leukocytosis          Plan:     Medications:  Continue Neurontin 300 mg TID.  Continue Lithium 600 mg in the AM and 750 mg at HS (level 0.9).  Continue Zyprexa 20 mg at HS.  Risperdal Consta 25 mg was initiated on 3/10 and 37.5 mg was administered on 3/24.  She is interested in Zyprexa Relprevv but aware that this is not on the hospital formulary, nor is it available through most agencies in the community.  Continue PRNs of Cogentin, Hydroxyzine, Melatonin and Zyprexa.    Plan for discharge to her apartment this week after plan for Risperdal Consta injection is coordinated.  She has outpatient psychiatry, ILS and a home care RN.          PHILL Barahona, CNP  [facility] Psychiatry

## 2022-03-29 NOTE — PLAN OF CARE
03/29/22 1413   Group Therapy Session   Group Attendance attended group session   Time Session Began 1305   Time Session Ended 1315   Total Time patient participated (minutes) 60   Group Type psychotherapeutic   Group Topic Covered coping skills/lifestyle management;cognitive therapy techniques   Group Session Detail Pt attended group. Initially engaged with random questions including a question on the definition of success. This segued into a discussion about the myth of happiness. CTC also discussed with the group determining the difference between daily emotions and reactions/stressful situations (distress and eustress) and chronic situations (mental illness, chemical dependency, trauma history, physical issues, etc).   Patient Response/Contribution cooperative with task;discussed personal experience with topic   Patient Response Detail Pt participated actively, primarily was on task and organized with the exception of one question when she digressed and began rambling. Pt was able to be re-directed.

## 2022-03-29 NOTE — PLAN OF CARE
03/28/22 2133   Group Therapy Session   Group Attendance attended group session   Time Session Began 1615   Time Session Ended 1715   Total Time patient participated (minutes) 45   Group Type recreation   Group Topic Covered leisure exploration/use of leisure time   Group Session Detail OT - leisure group   Patient Response/Contribution able to recall/repeat info presented;cooperative with task   Patient Response Detail Pt participated in the OT leisure group.  Able to learn new game, demonstrated understanding and was able to use strategy and problem solving skills.  Appropriate social interactions with others.  Left group about 15 minutes early complaining that her back was bothering her, and she needed to stand/walk around.

## 2022-03-29 NOTE — PLAN OF CARE
"Goal Outcome Evaluation:    Plan of Care Reviewed With: patient      RN Note:    Patient presents with Blunted/Flat affect and  calm  mood. Patient was calm, cooperative, and pleasant during this shift. Patient reports anxiety and depression have improved \"a lot\" and endorses feeling \"bored.\" Patient is hopeful to discharge this week. Patient denies  SI, SIB, HI, and AVH. Patient denies  pain. Patient was observed journaling, socializing with peers, and napping in bed. Patient  did attend groups. Patient is med-compliant. No medication side effects observed or endorsed by patient.    Patient compliant with COVID swab, results pending.    Patient reports feeling short of breath, requested and received PRN Albuterol 2 puffs at 1410. Patient reports immediate relief.    /80 (BP Location: Left arm, Patient Position: Sitting, Cuff Size: Adult Regular)   Pulse 93   Temp 97.6  F (36.4  C) (Oral)   Resp 16   Ht 1.626 m (5' 4\")   Wt 107 kg (236 lb)   SpO2 93%   BMI 40.51 kg/m                 "

## 2022-03-30 VITALS
WEIGHT: 236 LBS | RESPIRATION RATE: 16 BRPM | HEIGHT: 64 IN | SYSTOLIC BLOOD PRESSURE: 105 MMHG | BODY MASS INDEX: 40.29 KG/M2 | OXYGEN SATURATION: 98 % | TEMPERATURE: 98.2 F | HEART RATE: 68 BPM | DIASTOLIC BLOOD PRESSURE: 74 MMHG

## 2022-03-30 LAB — GLUCOSE BLDC GLUCOMTR-MCNC: 126 MG/DL (ref 70–99)

## 2022-03-30 PROCEDURE — 250N000013 HC RX MED GY IP 250 OP 250 PS 637: Performed by: PSYCHIATRY & NEUROLOGY

## 2022-03-30 PROCEDURE — G0177 OPPS/PHP; TRAIN & EDUC SERV: HCPCS

## 2022-03-30 PROCEDURE — 99239 HOSP IP/OBS DSCHRG MGMT >30: CPT | Performed by: NURSE PRACTITIONER

## 2022-03-30 PROCEDURE — 250N000013 HC RX MED GY IP 250 OP 250 PS 637: Performed by: PHYSICIAN ASSISTANT

## 2022-03-30 RX ORDER — METFORMIN HCL 500 MG
1000 TABLET, EXTENDED RELEASE 24 HR ORAL 2 TIMES DAILY WITH MEALS
Qty: 120 TABLET | Refills: 1 | Status: SHIPPED | OUTPATIENT
Start: 2022-03-30 | End: 2022-05-19 | Stop reason: DRUGHIGH

## 2022-03-30 RX ADMIN — LITHIUM CARBONATE 600 MG: 300 TABLET, EXTENDED RELEASE ORAL at 08:23

## 2022-03-30 RX ADMIN — METFORMIN HYDROCHLORIDE 1000 MG: 500 TABLET, EXTENDED RELEASE ORAL at 08:23

## 2022-03-30 RX ADMIN — FLUTICASONE PROPIONATE 2 SPRAY: 50 SPRAY, METERED NASAL at 08:23

## 2022-03-30 RX ADMIN — GABAPENTIN 300 MG: 300 CAPSULE ORAL at 08:23

## 2022-03-30 RX ADMIN — LEVOTHYROXINE SODIUM 50 MCG: 25 TABLET ORAL at 08:23

## 2022-03-30 NOTE — PLAN OF CARE
Pt appeared to sleep 4 hours. Pt up intermittently to lounge and redirected to room when falling asleep in chair. Pt up in room multiple times and turning light on each time. Pt encouraged to rest. No concerns reported. Safety checks done at least every 15 minutes.

## 2022-03-30 NOTE — PLAN OF CARE
Goal Outcome Evaluation:    Plan of Care Reviewed With: patient        Discharge order entered YES    Patient discharging 3/30/2022 accompanied by friend and destination is home. Discharge paperwork and medications reviewed with Patient, Caregiver who verbalizes understanding.     Patient denies current or recent suicidal ideation    SIB denies    HI: denies current or recent homicidal ideation or behaviors.    Copies provided: AVS YES    Patient completed Personal Plan of Care, identifying reasons for hospitalization and goals for discharge.     Survey provided.

## 2022-03-30 NOTE — PLAN OF CARE
OT PROGRESS NOTE:       03/30/22 1411   Group Therapy Session   Group Attendance attended group session   Time Session Began 1315   Time Session Ended 1400   Total Time patient participated (minutes) 45   Total # Attendees 1   Group Type life skill   Group Topic Covered self-care activities   Group Session Detail Discussed reality orientation and goal setting. Applied info discussed in activity.   Patient Response/Contribution cooperative with task;discussed personal experience with topic;disorganized;expressed reluctance to alter behaviors;verbalizations were off topic   Patient Response Detail Initiated group. Attentive to explanation of goal setting and purpose/desired outcome. Expressed she feels limited by out pt therapists simple tasks she finds unhelpful. Listened to explanation simple skill acheivement may be required for more complex skill attainment. Encouraged to write her thoughts and ideas down to organize and have a clear flow to content otherwise she may lose people in the tangents. Appeared open to the idea and willing to practice to see if it could be beneficial.

## 2022-03-30 NOTE — PLAN OF CARE
Tasks Complete:      CTC spoke with pt about discharge planning. Pt will need transportation to  her medications today before 5pm from [facility] in [place]. Pt confirmed that a friend is able to transport at 3pm today.     CTC called pharmacy to confirm discharge plan and that pt will  medications this afternoon. They confirmed medications will be filled when pt arrives.    [facility] coordinated with OP team and requested for ILS and nursing both to work with pt tomorrow to assist pt with medication management and organizing her upcoming appointments on her ipad. COLLEEN MOTA noted that both ILS and nursing is able to provide in home services tomorrow.       Post round meeting with team @9:30 am updates: Team discussed discharge planning. Pt is able to discharge today if she has transportation to [facility] to  her medications today. If not, pt will discharge tomorrow morning and medications will be delivered in the morning.     Current Symptoms include the following:  Pt engaed in groups and is medication compliant.     Addressed patient needs/concerns:   No concerns noted     Discharge Plan or Goal   Plan  Discharge home with CADI Waiver services. PT will  medication today from [facility] and has agreed to take her evening medications and store the Risperdal Consta injection in the fridge this evening. Nursing and ILS services will assist with medication management and organization tomorrow 3/31.     Pt would benefit from rep payee and ACT team however has declined and would like to continue with CADI Waiver services.     SN will provide RC injection  Pharmacy: [facility]    CADI WILL REMAIN OPEN PAST 30 days of hospital admission     Care Team   CADI Waiver CM- Shira NanceNuaq[place]- [phone]   Therapist- Callie Manley LP-[phone]  Psychiatrist-Shirley Gooden,NP- [facility]-   Financial Worker- Elvira Bae-  [facility] [phone]  Outpatient DBT Group through [facility] ( [place])        Barriers to Discharge   Ongoing stabilization     Referral Status  No referrals needed at this time     Legal Status  Pt is voluntary

## 2022-03-30 NOTE — PLAN OF CARE
"Goal Outcome Evaluation:    Plan of Care Reviewed With: patient        The patient had c/o low energy this evening and has been napping periodically.  Her affect is blunted and her mood is calm. Denied pain, reports having social anxiety and feeling \"a little\" depressed. Said she has been here in the hospital for almost a month and that she is looking forward to going home. Had a visitor this evening and visit appeared to go well. She did not attend the evening group as she was napping. She was complaint with scheduled medications    Blood sugars this evenin & 185         "

## 2022-03-30 NOTE — PLAN OF CARE
"Goal Outcome Evaluation:    Plan of Care Reviewed With: patient     RN Note:    Patient presents with Blunted/Flat affect and  calm  mood. Patient continues to report social anxiety but believes her anxiety and depression have resolved. She reports feeling happy to discharge and see her daughter, but her affect/mood is incongruent with these statements. Patient denies  SI, SIB, HI, and AVH. Patient denies  pain. Patient was observed reading in bed and using her journal. Patient  did attend groups. Patient is med-compliant. No medication side effects observed or endorsed by patient.        /74 (BP Location: Left arm, Patient Position: Sitting, Cuff Size: Adult Regular)   Pulse 68   Temp 98.2  F (36.8  C) (Oral)   Resp 16   Ht 1.626 m (5' 4\")   Wt 107 kg (236 lb)   SpO2 98%   BMI 40.51 kg/m          "

## 2022-03-30 NOTE — PLAN OF CARE
OT PROGRESS NOTE:       03/30/22 1308   Group Therapy Session   Group Attendance attended group session   Time Session Began 1115   Time Session Ended 1215   Total Time patient participated (minutes) 45   Total # Attendees 3   Group Type expressive therapy   Group Topic Covered emotions/expression   Group Session Detail Creative Expression with use of crafts and executive skill use for improved self management and assessment of functional performance.   Patient Response/Contribution able to recall/repeat info presented;cooperative with task;discussed personal experience with topic;expressed understanding of topic   Patient Response Detail Initiated group and asked for supplies assertively. Good attention to details and planning of familiar task. Spoke of her pending discharge. Looking forward to having her own space and routine.

## 2022-03-30 NOTE — DISCHARGE SUMMARY
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 3/24/2022.  She found PRN Zyprexa more beneficial than Risperdal in managing her symptoms.  She tapered off oral Risperdal.  Zyprexa was initiated and titrated to 30 mg at HS.  Lithium was titrated to 600 mg in the morning and 750 mg in the evening, resulting in a level of 0.9.  Neurontin 300 mg TID was continued to target anxiety.      Maddy Ortiz did participate in groups and was visible in the milieu.  Behavior was calm and cooperative.     The patient's symptoms of psychosis improved.  She denied hallucinations.  Thoughts were better organized overall.  She had less paranoia regarding the carpet in her apartment, stating that when she is decompensated she is generally more sensitive to odors.  She continued to have some grandiose thoughts, talking about her desire to collaborate with medical professionals to design an implantable device to select for certain genetics at the time of conception, though she did express some recognition of the grandiose nature of this.  Speech was intermittently rambling, but overall she was less hyperverbal.  Eye contact was slightly improved.  Mood was less depressed.  She denied suicidal ideation.  She was hopeful and future-oriented.  She endorsed intermittent anxiety and irritability.  Sleep was improved.  Appetite was normal.       Maddy Ortiz was released to home.  At the time of discharge Maddy Ortiz was determined to not be a danger to herself or others.          Discharge Medications:     Current Discharge Medication List      START taking these medications    Details   acetaminophen (TYLENOL) 325 MG tablet Take 2 tablets (650 mg) by mouth every 4 hours as needed for mild pain (to moderate pain)    Associated Diagnoses: Schizoaffective disorder, bipolar type (H)      cetirizine (ZYRTEC) 10 MG tablet Take 1 tablet (10 mg) by mouth nightly as needed for allergies or rhinitis  Qty: 30 tablet, Refills: 1    Associated Diagnoses: Seasonal allergic rhinitis due to pollen      fluticasone (FLONASE) 50 MCG/ACT nasal spray Spray 2 sprays into both nostrils daily  Qty: 15.8 mL, Refills: 1    Associated Diagnoses: Seasonal allergic rhinitis due to pollen      OLANZapine (ZYPREXA) 20 MG tablet Take 1 tablet (20 mg) by mouth every evening  Qty: 30 tablet, Refills: 1    Associated Diagnoses: Schizoaffective disorder, bipolar type (H)      risperiDONE microspheres ER (RISPERDAL CONSTA) 37.5 MG injection Inject 2 mLs (37.5 mg) into the muscle every 14 days .  Due 4/7/2022.  Qty: 1 each, Refills: 1    Associated Diagnoses: Schizoaffective disorder, bipolar type (H)         CONTINUE these medications which have CHANGED    Details   albuterol (PROAIR HFA/PROVENTIL HFA/VENTOLIN HFA) 108 (90 Base) MCG/ACT inhaler Inhale 2 puffs into the lungs every 4 hours as needed for wheezing or shortness of breath / dyspnea  Qty: 18 g, Refills: 1    Comments: Pharmacy may dispense brand covered by insurance (Proair, or proventil or ventolin or generic albuterol inhaler)  Associated Diagnoses: Shortness of breath      gabapentin (NEURONTIN) 300 MG capsule Take 1 capsule (300 mg) by mouth 3 times daily  Qty: 90 capsule, Refills: 1    Associated Diagnoses: Numbness and tingling of both legs; Pain in both feet      hydrOXYzine (ATARAX) 25 MG tablet Take 1 tablet (25  mg) by mouth every 4 hours as needed for anxiety  Qty: 60 tablet, Refills: 1    Associated Diagnoses: Schizoaffective disorder, bipolar type (H)      levothyroxine (SYNTHROID/LEVOTHROID) 50 MCG tablet Take 1 tablet (50 mcg) by mouth every morning (before breakfast)  Qty: 30 tablet, Refills: 1    Associated Diagnoses: Other specified hypothyroidism      !! lithium ER (ESKALITH CR/LITHOBID) 450 MG CR tablet Take 1 tablet (450 mg) by mouth every evening along with one 300 mg tablet for 750 mg total.  Qty: 30 tablet, Refills: 1    Associated Diagnoses: Schizoaffective disorder, bipolar type (H)      !! lithium ER (LITHOBID) 300 MG CR tablet Take 2 tablets (600 mg) by mouth every morning and take 1 tablet every evening along with one 450 mg tablet for 750 mg total in the evening  Qty: 90 tablet, Refills: 1    Associated Diagnoses: Schizoaffective disorder, bipolar type (H)      Melatonin 10 MG TABS tablet Take 1 tablet (10 mg) by mouth nightly as needed for sleep  Qty: 30 tablet, Refills: 1    Associated Diagnoses: Schizoaffective disorder, bipolar type (H)      metFORMIN (FORTAMET) 1000 MG 24 hr tablet Take 1 tablet (1,000 mg) by mouth 2 times daily (with meals)  Qty: 60 tablet, Refills: 1    Associated Diagnoses: Elevated LFTs      terbinafine (LAMISIL AT) 1 % external cream Apply topically 2 times daily as needed (rash/itching.  Apply to feet.)  Qty: 30 g, Refills: 1    Associated Diagnoses: Tinea pedis of both feet       !! - Potential duplicate medications found. Please discuss with provider.      CONTINUE these medications which have NOT CHANGED    Details   ammonium lactate (LAC-HYDRIN) 12 % external cream Apply topically 2 times daily as needed for dry skin  Qty: 385 g, Refills: 3    Associated Diagnoses: Type II diabetes mellitus with peripheral autonomic neuropathy (H); Fissure in skin of both feet      EPINEPHrine (ANY BX GENERIC EQUIV) 0.3 MG/0.3ML injection 2-pack Inject 0.3 mg into the muscle as needed for  "anaphylaxis      mineral oil-hydrophilic petrolatum (AQUAPHOR) external ointment Apply topically daily as needed for dry skin (apply to dry skin and rash around umbilical hernia)         STOP taking these medications       benztropine (COGENTIN) 0.5 MG tablet Comments:   Reason for Stopping:         risperiDONE (RISPERDAL) 0.5 MG tablet Comments:   Reason for Stopping:                    Psychiatric Examination:     Appearance:  alert, dressed in street clothes, fair grooming/hygiene  Attitude:  cooperative  Eye Contact:  minimal but improved compared to admission  Mood:  \"good,\" anxious at times  Affect:  more range compared to admission  Speech:  clear, coherent and normal prosody, less hyperverbal/rambling  Language: fluent and intact in English  Psychomotor, Gait, Musculoskeletal:  no evidence of tardive dyskinesia, dystonia, or tics  Thought Process:  better organized, compared to admission  Associations: no looseness of associations  Thought Content:  no evidence of suicidal ideation or homicidal ideation and denies hallucinations, paranoid/delusional thought content is present but reduced compared to admission  Insight:  fair, improved   Judgement:  fair, improved  Oriented to:  time, date, person, and place  Attention Span and Concentration:  variable, improved overall  Recent and Remote Memory:  fair  Fund of Knowledge:  average    /80 (BP Location: Left arm, Patient Position: Sitting, Cuff Size: Adult Regular)   Pulse 92   Temp 98.2  F (36.8  C) (Oral)   Resp 16   Ht 1.626 m (5' 4\")   Wt 107 kg (236 lb)   SpO2 93%   BMI 40.51 kg/m             Discharge Plan:     Per Discharge AVS:    Summary: You were admitted on 3/3/2022 due to disorganized thinking, paranoia and delusions, along with depressive symptoms.  You were treated by Mary Reeves NP and discharged on 03/29/22 from [place] [address] to home.    Health Care Follow-up:     Therapy Appointment: 04/01/2022 10:00am via " video  Callie Manley LP - [facility] # [phone]    Psychiatry Appointment: 04/20/20229:30AM via video  Shirley Gooden NP - [facility] #[phone]    Care Team     COLLEEN Roche CM- [place]- [phone] - You receive ILS and in home nursing    Therapist - Callie Manley LP - [phone]    Psychiatrist - Shirley Gooden NP - [facility]- [phone]    Financial Worker - Elvira Bae [facility]- [phone]    Outpatient DBT Group through [facility] ([place])       A 30-day supply of medications was e-prescribed to [facility].  She was advised to take her medications as prescribed and to abstain from alcohol and illicit substances.        Attestation:  The patient has been seen and evaluated by me, PHILL Mcrae CNP   Discharge time > 30 minutes

## 2022-03-31 ENCOUNTER — PATIENT OUTREACH (OUTPATIENT)
Dept: NURSING | Facility: CLINIC | Age: 37
End: 2022-03-31
Payer: MEDICARE

## 2022-03-31 ENCOUNTER — TELEPHONE (OUTPATIENT)
Dept: CARE COORDINATION | Facility: CLINIC | Age: 37
End: 2022-03-31

## 2022-03-31 DIAGNOSIS — Z71.89 OTHER SPECIFIED COUNSELING: ICD-10-CM

## 2022-03-31 ASSESSMENT — ACTIVITIES OF DAILY LIVING (ADL)
DEPENDENT_IADLS:: CLEANING;COOKING;LAUNDRY;SHOPPING;MEAL PREPARATION;TRANSPORTATION;MONEY MANAGEMENT;MEDICATION MANAGEMENT

## 2022-03-31 NOTE — TELEPHONE ENCOUNTER
Left message on answering machine for patient to call back.    Earliest appointment would be April 26 at 11am in [place].     There is nothing earlier than May 6th in [place] available. She can be placed on wait list if interested.    If pt should call back, please assist her in scheduling an appointment.     Callie VELASQUEZ RN  Specialty/Allergy Clinics

## 2022-03-31 NOTE — PROGRESS NOTES
Clinic Care Coordination Contact    Clinic Care Coordination Contact  OUTREACH with Post Discharge Assessment    Referral Information:  Referral Source: IP Report    Primary Diagnosis: Behavioral Health    Chief Complaint   Patient presents with     Clinic Care Coordination - Post Hospital     Psychosis, SI     Clinic Care Coordination - Initial        Universal Utilization:   Clinic Utilization  Difficulty keeping appointments: No  Compliance Concerns: No  No-Show Concerns: No  No PCP office visit in Past Year: No    Utilization    Hospital Admissions  3             ED Visits  6             No Show Count (past year)  5                Current as of: 3/31/2022  8:49 AM              Clinical Concerns:  Current Medical Concerns: Pt presented to [facility] ED 3/3/22 for evaluation of abdominal pain and difficulty thinking post medication change (Vraylar to Risperdal). Pt was transferred to [facility]. Pt was admitted 3/3/22 to 3/30/22 for psychosis, SI. Noted some issues with medication compliance, has home care RN.     Patient Active Problem List   Diagnosis     Animal dander allergy     CARDIOVASCULAR SCREENING; LDL GOAL LESS THAN 160     Psychosis (H)     Paranoid type delusional disorder (H)     Bipolar 1 disorder (H)     Insomnia     Depression with anxiety     Seasonal allergic rhinitis due to pollen     Allergic rhinitis due to mold     Allergic rhinitis due to animal dander     Allergic rhinitis due to dust mite     Encounter for IUD insertion     Schizoaffective disorder, bipolar type (H)     Gastroesophageal reflux disease without esophagitis     Paranoia (psychosis) (H)     Morbid obesity (H)     Schizoaffective disorder (H)     Umbilical hernia without obstruction and without gangrene     Fatty liver     Diabetes mellitus, type 2 (H)     Elevated LFTs     Disorganized behavior     Infection due to 2019 novel coronavirus        Current Behavioral Concerns: Mental health concerns, has extensive outpatient  care team involved       Education Provided to patient: AVS and appt review      Health Maintenance Reviewed: Due/Overdue     Health Maintenance Due   Topic Date Due     MICROALBUMIN  Never done     DIABETIC FOOT EXAM  Never done     EYE EXAM  Never done     Pneumococcal Vaccine: Pediatrics (0 to 5 Years) and At-Risk Patients (6 to 64 Years) (1 of 2 - PPSV23) Never done     HEPATITIS B IMMUNIZATION (2 of 3 - Risk 3-dose series) 03/16/1998     INFLUENZA VACCINE (1) 09/01/2021     A1C  04/08/2022       Clinical Pathway: None    Admission:    Admission Date: 03/03/22   Reason for Admission: abdominal pain, difficulty thinking post medication change  Discharge:   Discharge Date: 03/30/22  Discharge Diagnosis: psychosis, SI    Enrollment  Primary Care Care Coordination Status: Declined    Discharge Assessment  How are you doing now that you are home?: Okay  How are your symptoms? (Red Flag symptoms escalate to triage hotline per guidelines): Improved  Do you feel your condition is stable enough to be safe at home until your provider visit?: Yes  Does the patient have their discharge instructions? : Yes  Does the patient have questions regarding their discharge instructions? : No  Were you started on any new medications or were there changes to any of your previous medications? : Yes  Does the patient have all of their medications?: Yes  Do you have questions regarding any of your medications? : No  Do you have all of your needed medical supplies or equipment (DME)?  (i.e. oxygen tank, CPAP, cane, etc.): Yes  Discharge follow-up appointment scheduled within 14 calendar days? : No  Discharge Follow Up Appointment Date: 04/20/22  Discharge Follow Up Appointment Scheduled with?: Specialty Care Provider (Psychiatry, no PCP needs noted)  Is patient agreeable to assistance with scheduling? : No    AVS:    Therapy Appointment: 04/01/2022 10:00am via video  Callie Manley LP- [facility] # [phone]    Psychiatry  Appointment: 04/20/20229:30AM via video  Shirley Gooden NP- [facility] #[phone]    Care Team  COLLEEN Roche CM- [place]- [phone] - You receive ILS and in home nursing  Therapist- Callie Manley LP-[phone]  Psychiatrist-Shirley Gooden NP- [facility]- [phone]  [facility] [phone]  Outpatient DBT Group through [facility] ([place])    -----------------------     CC outreach to pt for hospital discharge follow up.     Pt would like sooner appt with allergy. CC to send message to allergy team.    Reviewed follow up therapy and psychiatry appts. Pt wrote down, these are her usual providers.     No PCP follow up needs noted in chart or from pt.     CC to send discharge summary via [employer] as well to pt. Pt agreed. Thanked CC for call.     Medication Management:  Medication review status: Medications reviewed.  Changes noted per patient report.    START taking:  acetaminophen (TYLENOL)  cetirizine (zyrTEC)  fluticasone (FLONASE)  OLANZapine (zyPREXA)  risperiDONE microspheres ER (risperDAL CONSTA)  Start taking on: April 7, 2022    CHANGE how you take:  hydrOXYzine (ATARAX)  levothyroxine (SYNTHROID/LEVOTHROID)  lithium (ESKALITH CR/LITHOBID)  lithium ER (LITHOBID)  terbinafine (lamISIL AT)    STOP taking:  benztropine 0.5 MG tablet (COGENTIN)  risperiDONE 0.5 MG tablet (risperDAL)    Functional Status:  Dependent ADLs: Independent  Dependent IADLs: Cleaning, Cooking, Laundry, Shopping, Meal Preparation, Transportation, Money Management, Medication Management  Bed or wheelchair confined: No  Mobility Status: Independent  Fallen 2 or more times in the past year?: No  Any fall with injury in the past year?: No    Living Situation:  Current living arrangement: I live alone  Type of residence: Apartment    Lifestyle & Psychosocial Needs:    Social Determinants of Health     Tobacco Use: Low Risk       Smoking Tobacco Use: Never Smoker     Smokeless Tobacco Use: Never Used   Alcohol Use: Not on file   Financial Resource Strain: Not on file   Food Insecurity: Not on file   Transportation Needs: Not on file   Physical Activity: Not on file   Stress: Not on file   Social Connections: Not on file   Intimate Partner Violence: Not on file   Depression: Not at risk     PHQ-2 Score: 2   Housing Stability: Not on file       Diet: Regular  Mental health DX: Yes  Mental health DX how managed: Medication, Outpatient Counseling, Psychiatrist  Mental health management concern: Yes  Chemical Dependency Status: No Current Concerns     Care Coordinator has reviewed patient's Social Determinants of Health (SDoH) on this date. Upon review, changes were not made.      Resources and Interventions:  Current Resources:   Community Resources: Financial/Insurance, County Programs, County Worker, ,  Mental Health, Housekeeping/Chore Agency, Transportation Services  Supplies Currently Used at Home: None  Equipment Currently Used at Home: none     Advance Care Plan/Directive  Advanced Care Plans/Directives on file: No    Referrals Placed: None     Patient/Caregiver understanding: Pt reports understanding and denies any additional questions or concerns at this times. SW CC engaged in AIDET communication during encounter.       Future Appointments              In 1 week ZAHRA Serna MD [facility] and [facility]ariatrics [facility] [place] [facility]    In 2 weeks France Kingsley RD [facility] and [facility]ariatrics [facility] [place] [facility]    In 1 month Rudy Shin MD [facility]    In 2 months ZAHRA Serna MD [facility] and [facility]ariatrics Care [place] [facility]    In 2 months Krishna Olivia MD [facility]          Plan: No further outreaches will be made at this time unless a new referral is made or a  change in the pt's status occurs.     Patient was provided with this writer's contact information and encouraged to call with any questions or concerns. Pt to attend scheduled appts.     SYLVESTER CC will send care coordination introduction letter to patient via [employer]. SYLVESTER CC sent discharge summary to pt via [employer]. SYLVESTER FRAUSTO sent msg to allergy team in separate encounter.     TEREZA Anthony   Social Work Clinic Care Coordinator   [facility]  [phone]  [email or web address]

## 2022-03-31 NOTE — TELEPHONE ENCOUNTER
Pt requesting assistance getting a sooner appt with allergy provider Dr Shin. Currently scheduled for 5/6/22.     TEREZA Anthony   Social Work Clinic Care Coordinator   [facility]

## 2022-03-31 NOTE — LETTER
[facility] CARE COORDINATION  [facility]  [address] [place] [address]    March 31, 2022    Maddy Ortiz  [address] [facility] [address]  [place] [address]      Dear Madyd,    I am a clinic care coordinator who works with PIHLL Luo CNP at [facility]. I wanted to thank you for spending the time to talk with me.  Below is a description of clinic care coordination and how I can further assist you.      The clinic care coordination team is made up of a registered nurse,  and community health worker who understand the health care system. The goal of clinic care coordination is to help you manage your health and improve access to the health care system in the most efficient manner. The team can assist you in meeting your health care goals by providing education, coordinating services, strengthening the communication among your providers and supporting you with any resource needs.    Please feel free to contact the Community Health Worker at [phone] with any questions or concerns. We are focused on providing you with the highest-quality healthcare experience possible and that all starts with you.     Sincerely,     TEREZA Anthony   Social Work Clinic Care Coordinator   [facility]

## 2022-04-01 ENCOUNTER — TELEPHONE (OUTPATIENT)
Dept: PSYCHIATRY | Facility: CLINIC | Age: 37
End: 2022-04-01
Payer: MEDICARE

## 2022-04-01 NOTE — TELEPHONE ENCOUNTER
MTM referral from: Transitions of Care (recent hospital discharge or ED visit)    MTM referral outreach attempt #2 on April 1, 2022 at 2:03 PM      Outcome: Patient not reachable after several attempts, will route to [facility] Pharmacist/Provider as an FYI.  [facility] scheduling number is [phone].  Thank you for the referral.    Liz Knight [facility]

## 2022-04-02 ENCOUNTER — NURSE TRIAGE (OUTPATIENT)
Dept: NURSING | Facility: CLINIC | Age: 37
End: 2022-04-02
Payer: MEDICARE

## 2022-04-03 NOTE — TELEPHONE ENCOUNTER
States she has pain down back of R leg due to peripheral neuropathy. Takes gabapentin 300 mg 3x/day  for this. She has had this pain for several months. Saw Neurology 2/4/22 and but bothering her more today. Rates pain 7 out of 10. Denies injury or overuse, new or strenuous activity. No swelling, redness or tenderness of leg. Mild numbness which is not new. Walking like usual, no increased pain w/ walking or weight bearing. Triaged to home care. Advised she contact her neurologist next week if pain continues to be bothersome. Pt voiced understanding and agreement.       Reason for Disposition    Leg pain    Additional Information    Negative: Looks like a broken bone or dislocated joint (e.g., crooked or deformed)    Negative: Sounds like a life-threatening emergency to the triager    Negative: Followed a leg injury    Negative: Leg swelling is main symptom    Negative: Back pain radiating (shooting) into leg(s)    Negative: Knee pain is main symptom    Negative: Ankle pain is main symptom    Negative: Pregnant    Negative: Postpartum (from 0 to 6 weeks after delivery)    Negative: Chest pain    Negative: Difficulty breathing    Negative: Entire foot is cool or blue in comparison to other side    Negative: Unable to walk    Negative: [1] Red area or streak AND [2] fever    Negative: [1] Swollen joint AND [2] fever    Negative: [1] Cast on leg or ankle AND [2] now increased pain    Negative: Patient sounds very sick or weak to the triager    Negative: [1] SEVERE pain (e.g., excruciating, unable to do any normal activities) AND [2] not improved after 2 hours of pain medicine    Negative: [1] Thigh or calf pain AND [2] only 1 side AND [3] present > 1 hour    Negative: [1] Thigh, calf, or ankle swelling AND [2] only 1 side    Negative: [1] Thigh, calf, or ankle swelling AND [2] bilateral AND [3] 1 side is more swollen    Negative: [1] Red area or streak AND [2] large (> 2 in. or 5 cm)    Negative: History of prior  "\"blood clot\" in leg or lungs (i.e., deep vein thrombosis, pulmonary embolism)    Negative: History of inherited increased risk of blood clots (e.g., Factor 5 Leiden, Anti-thrombin 3, Protein C or Protein S deficiency, Prothrombin mutation)    Negative: Major surgery in the past month    Negative: Hip or leg fracture (broken bone) in past month (or had cast on leg or ankle in past month)    Negative: Illness requiring prolonged bedrest in past month (e.g., immobilization, long hospital stay)    Negative: Long-distance travel in past month (e.g., car, bus, train, plane; with trip lasting 6 or more hours)    Negative: Cancer treatment in the past two months (or has cancer now)    Negative: [1] Painful rash AND [2] multiple small blisters grouped together (i.e., dermatomal distribution or \"band\" or \"stripe\")    Negative: Looks like a boil, infected sore, deep ulcer or other infected rash (spreading redness, pus)    Negative: [1] Localized rash is very painful AND [2] no fever    Negative: Numbness in a leg or foot (i.e., loss of sensation)    Negative: Localized pain, redness or hard lump along vein    Negative: [1] MODERATE pain (e.g., interferes with normal activities, limping) AND [2] present > 3 days    Negative: [1] Swollen joint AND [2] no fever or redness    Negative: [1] Leg pain which occurs after walking a certain distance AND [2] disappears with rest AND [3] age > 50    Negative: [1] Pain in front of the lower leg(s) (shins)     AND [2] occurs with running or jumping exercise  (e.g., jogging,  basketball)    Negative: [1] MILD pain (e.g., does not interfere with normal activities) AND [2] present > 7 days    Negative: Leg pain or muscle cramp is a chronic symptom (recurrent or ongoing AND present > 4 weeks)    Negative: Caused by phantom leg pain    Negative: Caused by previously diagnosed varicose veins (same pain, worsened by prolonged standing, bulging veins in legs with worm-like appearance)    Negative: " [1] Caused by muscle cramps in the thigh, calf, or foot AND [2] present < 1 hour (brief, now gone)    Negative: Caused by overuse from recent vigorous activity (e.g.,  aerobics, jogging/running, physical work, prolonged walking, sports)    Negative: Caused by strained muscle    Protocols used: LEG PAIN-A-AH

## 2022-04-05 ENCOUNTER — NURSE TRIAGE (OUTPATIENT)
Dept: NURSING | Facility: CLINIC | Age: 37
End: 2022-04-05
Payer: MEDICARE

## 2022-04-06 ENCOUNTER — MYC MEDICAL ADVICE (OUTPATIENT)
Dept: NEUROLOGY | Facility: CLINIC | Age: 37
End: 2022-04-06
Payer: MEDICARE

## 2022-04-06 DIAGNOSIS — M79.672 PAIN IN BOTH FEET: ICD-10-CM

## 2022-04-06 DIAGNOSIS — R20.0 NUMBNESS AND TINGLING OF BOTH LEGS: ICD-10-CM

## 2022-04-06 DIAGNOSIS — R20.2 NUMBNESS AND TINGLING OF BOTH LEGS: ICD-10-CM

## 2022-04-06 DIAGNOSIS — M79.671 PAIN IN BOTH FEET: ICD-10-CM

## 2022-04-06 RX ORDER — GABAPENTIN 300 MG/1
CAPSULE ORAL
Qty: 120 CAPSULE | Refills: 3 | Status: SHIPPED | OUTPATIENT
Start: 2022-04-06 | End: 2022-08-30

## 2022-04-06 NOTE — TELEPHONE ENCOUNTER
"Maddy is calling regarding ongoing neuropathy pain in her legs.    She is wondering if there is anything else that could help with her pain, such as medication, treatments/therapies. She also wonders if she would be eligible for an assistant/PCA to help her at home    Her pain has worsened today - rated ~7/10  - Walking around 2-2.5 hours shopping for essentials  - Needing to clean/prepare apartment for move out inspection    She has been using  - Gabapentin 300 mg 3 time a day  - Acetaminophen  - Hot baths    Advised to see PCP within 2-3 days  Care Advice reviewed  Discussed trying to minimize activities that aggravate her condition and to spread out or \"pace\" those things that she must do.    Scheduling offered for Primary care, declined. Notified that for Neurology, she would need to call during clinic hours    Phone number provided for Neurology (found in 2/4/22 AVS):  If you have questions or need follow up information about today's clinic visit or your schedule please contact [facility] directly at [phone].    COVID 19 Nurse Triage Plan/Patient Instructions    Please be aware that novel coronavirus (COVID-19) may be circulating in the community. If you develop symptoms such as fever, cough, or SOB or if you have concerns about the presence of another infection including coronavirus (COVID-19), please contact your health care provider or visit [email or web address].     Disposition/Instructions    In-Person Visit with provider recommended. Reference Visit Selection Guide.    Thank you for taking steps to prevent the spread of this virus.  o Limit your contact with others.  o Wear a simple mask to cover your cough.  o Wash your hands well and often.    Resources    [facility]: About COVID-19: [email or web address]    CDC: What to Do If You're Sick: www.cdc.gov/coronavirus/2019-ncov/about/steps-when-sick.html    CDC: Ending Home Isolation: " www.cdc.gov/coronavirus/2019-ncov/hcp/disposition-in-home-patients.html     CDC: Caring for Someone: www.cdc.gov/coronavirus/2019-ncov/if-you-are-sick/care-for-someone.html     [facility]: Interim Guidance for Hospital Discharge to Home: www.health.[facility].mn.[place]/diseases/coronavirus/hcp/hospdischarge.pdf    [facility] clinical trials (COVID-19 research studies): clinicalaffairs.[facility]/umn-clinical-trials     Below are the COVID-19 hotlines at the [place] Department of Health ([facility]). Interpreters are available.   o For health questions: Call 721-079-9056 or 1-602.439.5361 (7 a.m. to 7 p.m.)  o For questions about schools and childcare: Call 213-603-8615 or 1-899.634.7645 (7 a.m. to 7 p.m.)     Bekah Franco RN  [facility]      Reason for Disposition    [1] Numbness or tingling in one or both feet AND [2] is a chronic symptom (recurrent or ongoing AND present > 4 weeks)    [1] MODERATE pain (e.g., interferes with normal activities, limping) AND [2] present > 3 days    Additional Information    Negative: Looks like a broken bone or dislocated joint (e.g., crooked or deformed)    Negative: Sounds like a life-threatening emergency to the triager    Negative: [1] SEVERE weakness (i.e., unable to walk or barely able to walk, requires support) AND [2] new onset or worsening    Negative: [1] Weakness (i.e., paralysis, loss of muscle strength) of the face, arm / hand, or leg / foot on one side of the body AND [2] sudden onset AND [3] present now    Negative: [1] Numbness (i.e., loss of sensation) of the face, arm / hand, or leg / foot on one side of the body AND [2] sudden onset AND [3] present now    Negative: [1] Loss of speech or garbled speech AND [2] sudden onset AND [3] present now    Negative: Difficult to awaken or acting confused (e.g., disoriented, slurred speech)    Negative: Sounds like a life-threatening emergency to the triager    Negative: [1] Weakness (i.e., paralysis, loss of  muscle strength) of the face, arm / hand, or leg / foot on one side of the body AND [2] sudden onset AND [3] brief (now gone)    Negative: [1] Numbness (i.e., loss of sensation) of the face, arm / hand, or leg / foot on one side of the body AND [2] sudden onset AND [3] brief (now gone)    Negative: [1] Loss of speech or garbled speech AND [2] sudden onset AND [3] brief (now gone)    Negative: Bell's palsy suspected (i.e., weakness on only one side of the face, developing over hours to days, no other symptoms)    Negative: Patient sounds very sick or weak to the triager    Negative: Neck pain (and neurologic deficit)    Negative: Back pain (and neurologic deficit)    Negative: [1] Weakness of the face, arm / hand, or leg / foot on one side of the body AND [2] gradual onset (e.g., days to weeks) AND [3] present now    Negative: [1] Numbness (i.e., loss of sensation) of the face, arm / hand, or leg / foot on one side of the body AND [2] gradual onset (e.g., days to weeks) AND [3] present now    Negative: [1] Loss of speech or garbled speech AND [2] gradual onset (e.g., days to weeks) AND [3] present now    Negative: [1] Tingling (e.g., pins and needles) of the face, arm / hand, or leg / foot on one side of the body AND [2] present now    Negative: [1] Loss of speech or garbled speech AND [2] is a chronic symptom (recurrent or ongoing AND present > 4 weeks)    Negative: [1] Weakness of arm / hand, or leg / foot AND [2] is a chronic symptom (recurrent or ongoing AND present > 4 weeks)    Negative: [1] Numbness or tingling in one or both hands AND [2] is a chronic symptom (recurrent or ongoing AND present > 4 weeks)    Negative: Chest pain    Negative: Difficulty breathing    Negative: Entire foot is cool or blue in comparison to other side    Negative: Unable to walk    Negative: [1] Red area or streak AND [2] fever    Negative: [1] Swollen joint AND [2] fever    Negative: [1] Cast on leg or ankle AND [2] now increased  "pain    Negative: Patient sounds very sick or weak to the triager    Negative: [1] SEVERE pain (e.g., excruciating, unable to do any normal activities) AND [2] not improved after 2 hours of pain medicine    Negative: [1] Thigh or calf pain AND [2] only 1 side AND [3] present > 1 hour    Negative: [1] Thigh, calf, or ankle swelling AND [2] only 1 side    Negative: [1] Thigh, calf, or ankle swelling AND [2] bilateral AND [3] 1 side is more swollen    Negative: [1] Red area or streak AND [2] large (> 2 in. or 5 cm)    Negative: History of prior \"blood clot\" in leg or lungs (i.e., deep vein thrombosis, pulmonary embolism)    Negative: History of inherited increased risk of blood clots (e.g., Factor 5 Leiden, Anti-thrombin 3, Protein C or Protein S deficiency, Prothrombin mutation)    Negative: Major surgery in the past month    Negative: Hip or leg fracture (broken bone) in past month (or had cast on leg or ankle in past month)    Negative: Illness requiring prolonged bedrest in past month (e.g., immobilization, long hospital stay)    Negative: Long-distance travel in past month (e.g., car, bus, train, plane; with trip lasting 6 or more hours)    Negative: Cancer treatment in the past two months (or has cancer now)    Negative: [1] Painful rash AND [2] multiple small blisters grouped together (i.e., dermatomal distribution or \"band\" or \"stripe\")    Negative: Looks like a boil, infected sore, deep ulcer or other infected rash (spreading redness, pus)    Negative: [1] Localized rash is very painful AND [2] no fever    Negative: Numbness in a leg or foot (i.e., loss of sensation)    Negative: Localized pain, redness or hard lump along vein    Protocols used: NEUROLOGIC DEFICIT-A-AH, LEG PAIN-A-AH      "

## 2022-04-07 NOTE — TELEPHONE ENCOUNTER
Faxed RX April 7, 2022 to fax number [phone]    Right Fax confirmed at [phone]    Barbara Grijalva RN

## 2022-04-08 ENCOUNTER — HOSPITAL ENCOUNTER (EMERGENCY)
Facility: CLINIC | Age: 37
Discharge: HOME | End: 2022-04-09
Attending: EMERGENCY MEDICINE | Admitting: EMERGENCY MEDICINE
Payer: MEDICARE

## 2022-04-08 ENCOUNTER — NURSE TRIAGE (OUTPATIENT)
Dept: NURSING | Facility: CLINIC | Age: 37
End: 2022-04-08
Payer: MEDICARE

## 2022-04-08 DIAGNOSIS — E11.9 TYPE 2 DIABETES MELLITUS WITHOUT COMPLICATION, WITHOUT LONG-TERM CURRENT USE OF INSULIN (H): ICD-10-CM

## 2022-04-08 DIAGNOSIS — I10 HYPERTENSION, UNSPECIFIED TYPE: ICD-10-CM

## 2022-04-08 DIAGNOSIS — R06.00 DYSPNEA, UNSPECIFIED TYPE: ICD-10-CM

## 2022-04-08 DIAGNOSIS — R73.9 HYPERGLYCEMIA: ICD-10-CM

## 2022-04-08 LAB
BASOPHILS # BLD AUTO: 0.1 10E3/UL (ref 0–0.2)
BASOPHILS NFR BLD AUTO: 1 %
EOSINOPHIL # BLD AUTO: 0.4 10E3/UL (ref 0–0.7)
EOSINOPHIL NFR BLD AUTO: 3 %
ERYTHROCYTE [DISTWIDTH] IN BLOOD BY AUTOMATED COUNT: 12.8 % (ref 10–15)
GLUCOSE BLDC GLUCOMTR-MCNC: 254 MG/DL (ref 70–99)
HCT VFR BLD AUTO: 34.4 % (ref 35–47)
HGB BLD-MCNC: 11.3 G/DL (ref 11.7–15.7)
IMM GRANULOCYTES # BLD: 0.1 10E3/UL
IMM GRANULOCYTES NFR BLD: 1 %
LYMPHOCYTES # BLD AUTO: 2.5 10E3/UL (ref 0.8–5.3)
LYMPHOCYTES NFR BLD AUTO: 18 %
MCH RBC QN AUTO: 30.5 PG (ref 26.5–33)
MCHC RBC AUTO-ENTMCNC: 32.8 G/DL (ref 31.5–36.5)
MCV RBC AUTO: 93 FL (ref 78–100)
MONOCYTES # BLD AUTO: 1.1 10E3/UL (ref 0–1.3)
MONOCYTES NFR BLD AUTO: 8 %
NEUTROPHILS # BLD AUTO: 9.5 10E3/UL (ref 1.6–8.3)
NEUTROPHILS NFR BLD AUTO: 69 %
NRBC # BLD AUTO: 0 10E3/UL
NRBC BLD AUTO-RTO: 0 /100
PLATELET # BLD AUTO: 214 10E3/UL (ref 150–450)
RBC # BLD AUTO: 3.71 10E6/UL (ref 3.8–5.2)
WBC # BLD AUTO: 13.6 10E3/UL (ref 4–11)

## 2022-04-08 PROCEDURE — 83690 ASSAY OF LIPASE: CPT | Performed by: EMERGENCY MEDICINE

## 2022-04-08 PROCEDURE — 93005 ELECTROCARDIOGRAM TRACING: CPT | Performed by: EMERGENCY MEDICINE

## 2022-04-08 PROCEDURE — 84703 CHORIONIC GONADOTROPIN ASSAY: CPT | Performed by: EMERGENCY MEDICINE

## 2022-04-08 PROCEDURE — 84484 ASSAY OF TROPONIN QUANT: CPT | Performed by: EMERGENCY MEDICINE

## 2022-04-08 PROCEDURE — 80053 COMPREHEN METABOLIC PANEL: CPT | Performed by: EMERGENCY MEDICINE

## 2022-04-08 PROCEDURE — 83036 HEMOGLOBIN GLYCOSYLATED A1C: CPT

## 2022-04-08 PROCEDURE — 99285 EMERGENCY DEPT VISIT HI MDM: CPT | Mod: 25 | Performed by: EMERGENCY MEDICINE

## 2022-04-08 PROCEDURE — 36415 COLL VENOUS BLD VENIPUNCTURE: CPT | Performed by: EMERGENCY MEDICINE

## 2022-04-08 PROCEDURE — 93010 ELECTROCARDIOGRAM REPORT: CPT | Performed by: EMERGENCY MEDICINE

## 2022-04-08 PROCEDURE — 85379 FIBRIN DEGRADATION QUANT: CPT | Performed by: EMERGENCY MEDICINE

## 2022-04-08 PROCEDURE — 85025 COMPLETE CBC W/AUTO DIFF WBC: CPT | Performed by: EMERGENCY MEDICINE

## 2022-04-08 RX ORDER — SODIUM CHLORIDE 9 MG/ML
1000 INJECTION, SOLUTION INTRAVENOUS CONTINUOUS
Status: DISCONTINUED | OUTPATIENT
Start: 2022-04-08 | End: 2022-04-09 | Stop reason: HOSPADM

## 2022-04-08 NOTE — PATIENT INSTRUCTIONS

## 2022-04-09 ENCOUNTER — APPOINTMENT (OUTPATIENT)
Dept: CT IMAGING | Facility: CLINIC | Age: 37
End: 2022-04-09
Attending: EMERGENCY MEDICINE
Payer: MEDICARE

## 2022-04-09 ENCOUNTER — NURSE TRIAGE (OUTPATIENT)
Dept: NURSING | Facility: CLINIC | Age: 37
End: 2022-04-09
Payer: MEDICARE

## 2022-04-09 VITALS
OXYGEN SATURATION: 95 % | HEART RATE: 95 BPM | DIASTOLIC BLOOD PRESSURE: 85 MMHG | RESPIRATION RATE: 18 BRPM | SYSTOLIC BLOOD PRESSURE: 143 MMHG | BODY MASS INDEX: 44.83 KG/M2 | WEIGHT: 253 LBS | TEMPERATURE: 98.4 F | HEIGHT: 63 IN

## 2022-04-09 LAB
ALBUMIN SERPL-MCNC: 3.5 G/DL (ref 3.4–5)
ALBUMIN UR-MCNC: NEGATIVE MG/DL
ALP SERPL-CCNC: 86 U/L (ref 40–150)
ALT SERPL W P-5'-P-CCNC: 121 U/L (ref 0–50)
ANION GAP SERPL CALCULATED.3IONS-SCNC: 8 MMOL/L (ref 3–14)
APPEARANCE UR: ABNORMAL
AST SERPL W P-5'-P-CCNC: 119 U/L (ref 0–45)
BACTERIA #/AREA URNS HPF: ABNORMAL /HPF
BILIRUB SERPL-MCNC: 0.5 MG/DL (ref 0.2–1.3)
BILIRUB UR QL STRIP: NEGATIVE
BUN SERPL-MCNC: 10 MG/DL (ref 7–30)
CALCIUM SERPL-MCNC: 9.5 MG/DL (ref 8.5–10.1)
CHLORIDE BLD-SCNC: 103 MMOL/L (ref 94–109)
CO2 SERPL-SCNC: 24 MMOL/L (ref 20–32)
COLOR UR AUTO: YELLOW
CREAT SERPL-MCNC: 0.63 MG/DL (ref 0.52–1.04)
D DIMER PPP FEU-MCNC: 0.61 UG/ML FEU (ref 0–0.5)
GFR SERPL CREATININE-BSD FRML MDRD: >90 ML/MIN/1.73M2
GLUCOSE BLD-MCNC: 239 MG/DL (ref 70–99)
GLUCOSE BLDC GLUCOMTR-MCNC: 156 MG/DL (ref 70–99)
GLUCOSE UR STRIP-MCNC: >499 MG/DL
HCG SERPL QL: NEGATIVE
HGB UR QL STRIP: NEGATIVE
HOLD SPECIMEN: NORMAL
KETONES BLD-SCNC: 0.1 MMOL/L (ref 0–0.6)
KETONES UR STRIP-MCNC: NEGATIVE MG/DL
LACTATE SERPL-SCNC: 2.5 MMOL/L (ref 0.7–2)
LACTATE SERPL-SCNC: 2.7 MMOL/L (ref 0.7–2)
LEUKOCYTE ESTERASE UR QL STRIP: ABNORMAL
LIPASE SERPL-CCNC: 93 U/L (ref 73–393)
MUCOUS THREADS #/AREA URNS LPF: PRESENT /LPF
NITRATE UR QL: NEGATIVE
PH UR STRIP: 5 [PH] (ref 5–7)
POTASSIUM BLD-SCNC: 3.7 MMOL/L (ref 3.4–5.3)
PROT SERPL-MCNC: 7.6 G/DL (ref 6.8–8.8)
RBC URINE: 1 /HPF
SODIUM SERPL-SCNC: 135 MMOL/L (ref 133–144)
SP GR UR STRIP: 1.02 (ref 1–1.03)
SQUAMOUS EPITHELIAL: 3 /HPF
TROPONIN I SERPL HS-MCNC: 4 NG/L
UROBILINOGEN UR STRIP-MCNC: NORMAL MG/DL
WBC URINE: 18 /HPF

## 2022-04-09 PROCEDURE — 87086 URINE CULTURE/COLONY COUNT: CPT | Performed by: EMERGENCY MEDICINE

## 2022-04-09 PROCEDURE — 250N000011 HC RX IP 250 OP 636: Performed by: EMERGENCY MEDICINE

## 2022-04-09 PROCEDURE — 96361 HYDRATE IV INFUSION ADD-ON: CPT | Performed by: EMERGENCY MEDICINE

## 2022-04-09 PROCEDURE — G1004 CDSM NDSC: HCPCS

## 2022-04-09 PROCEDURE — 96365 THER/PROPH/DIAG IV INF INIT: CPT | Mod: 59 | Performed by: EMERGENCY MEDICINE

## 2022-04-09 PROCEDURE — 83605 ASSAY OF LACTIC ACID: CPT | Performed by: EMERGENCY MEDICINE

## 2022-04-09 PROCEDURE — 258N000003 HC RX IP 258 OP 636: Performed by: EMERGENCY MEDICINE

## 2022-04-09 PROCEDURE — 82010 KETONE BODYS QUAN: CPT | Performed by: EMERGENCY MEDICINE

## 2022-04-09 PROCEDURE — 93005 ELECTROCARDIOGRAM TRACING: CPT | Performed by: EMERGENCY MEDICINE

## 2022-04-09 PROCEDURE — 36415 COLL VENOUS BLD VENIPUNCTURE: CPT | Performed by: EMERGENCY MEDICINE

## 2022-04-09 PROCEDURE — 250N000009 HC RX 250: Performed by: EMERGENCY MEDICINE

## 2022-04-09 PROCEDURE — 81001 URINALYSIS AUTO W/SCOPE: CPT | Performed by: EMERGENCY MEDICINE

## 2022-04-09 RX ORDER — CEFTRIAXONE 1 G/1
1 INJECTION, POWDER, FOR SOLUTION INTRAMUSCULAR; INTRAVENOUS ONCE
Status: COMPLETED | OUTPATIENT
Start: 2022-04-09 | End: 2022-04-09

## 2022-04-09 RX ORDER — IOPAMIDOL 755 MG/ML
92 INJECTION, SOLUTION INTRAVASCULAR ONCE
Status: COMPLETED | OUTPATIENT
Start: 2022-04-09 | End: 2022-04-09

## 2022-04-09 RX ADMIN — CEFTRIAXONE 1 G: 1 INJECTION, POWDER, FOR SOLUTION INTRAMUSCULAR; INTRAVENOUS at 03:10

## 2022-04-09 RX ADMIN — IOPAMIDOL 92 ML: 755 INJECTION, SOLUTION INTRAVENOUS at 01:44

## 2022-04-09 RX ADMIN — SODIUM CHLORIDE 100 ML: 9 INJECTION, SOLUTION INTRAVENOUS at 01:44

## 2022-04-09 RX ADMIN — SODIUM CHLORIDE 1000 ML: 9 INJECTION, SOLUTION INTRAVENOUS at 00:25

## 2022-04-09 RX ADMIN — SODIUM CHLORIDE 1000 ML: 9 INJECTION, SOLUTION INTRAVENOUS at 01:33

## 2022-04-09 NOTE — ED PROVIDER NOTES
"     Emergency Department Patient Sign-out       Brief HPI:  This is a 36 year old female signed out to me by Dr. Huerta .  See initial ED Provider note for details of the presentation.            Significant Events prior to my assuming care: none      Exam:   Patient Vitals for the past 24 hrs:   BP Temp Temp src Pulse Resp SpO2 Height Weight   04/09/22 0330 (!) 154/101 -- -- 100 -- 95 % -- --   04/09/22 0315 -- -- -- -- -- 95 % -- --   04/09/22 0300 (!) 152/98 -- -- 94 -- 95 % -- --   04/09/22 0245 -- -- -- -- -- 93 % -- --   04/09/22 0230 126/79 -- -- 95 -- 96 % -- --   04/09/22 0215 128/76 -- -- -- -- 96 % -- --   04/09/22 0130 (!) 145/95 -- -- 101 -- 95 % -- --   04/09/22 0115 -- -- -- -- -- 95 % -- --   04/09/22 0100 115/82 -- -- 104 -- 95 % -- --   04/09/22 0030 135/81 -- -- 104 -- 96 % -- --   04/09/22 0027 (!) 140/79 -- -- -- -- 97 % -- --   04/09/22 0000 (!) 172/103 -- -- 105 -- 95 % -- --   04/08/22 2043 127/79 99.6  F (37.6  C) Tympanic 106 16 96 % 1.6 m (5' 3\") 114.8 kg (253 lb)           ED RESULTS:   Results for orders placed or performed during the hospital encounter of 04/08/22 (from the past 24 hour(s))   Glucose by meter     Status: Abnormal    Collection Time: 04/08/22 10:51 PM   Result Value Ref Range    GLUCOSE BY METER POCT 254 (H) 70 - 99 mg/dL   [place] Draw     Status: None    Collection Time: 04/08/22 11:22 PM    Narrative    The following orders were created for panel order [place] Draw.  Procedure                               Abnormality         Status                     ---------                               -----------         ------                     Extra Blue Top Tube[[id number]]                              Final result               Extra Red Top Tube[[id number]]                               Final result               Extra Green Top (Lithium...[[id number]]                      Final result               Extra Purple Top Tube[[id number]]                            Final result    "              Please view results for these tests on the individual orders.   Extra Blue Top Tube     Status: None    Collection Time: 04/08/22 11:22 PM   Result Value Ref Range    Hold Specimen JIC    Extra Red Top Tube     Status: None    Collection Time: 04/08/22 11:22 PM   Result Value Ref Range    Hold Specimen JIC    Extra Green Top (Lithium Heparin) Tube     Status: None    Collection Time: 04/08/22 11:22 PM   Result Value Ref Range    Hold Specimen JIC    Extra Purple Top Tube     Status: None    Collection Time: 04/08/22 11:22 PM   Result Value Ref Range    Hold Specimen JIC    CBC with platelets differential     Status: Abnormal    Collection Time: 04/08/22 11:22 PM    Narrative    The following orders were created for panel order CBC with platelets differential.  Procedure                               Abnormality         Status                     ---------                               -----------         ------                     CBC with platelets and d...[[id number]]  Abnormal            Final result                 Please view results for these tests on the individual orders.   Comprehensive metabolic panel     Status: Abnormal    Collection Time: 04/08/22 11:22 PM   Result Value Ref Range    Sodium 135 133 - 144 mmol/L    Potassium 3.7 3.4 - 5.3 mmol/L    Chloride 103 94 - 109 mmol/L    Carbon Dioxide (CO2) 24 20 - 32 mmol/L    Anion Gap 8 3 - 14 mmol/L    Urea Nitrogen 10 7 - 30 mg/dL    Creatinine 0.63 0.52 - 1.04 mg/dL    Calcium 9.5 8.5 - 10.1 mg/dL    Glucose 239 (H) 70 - 99 mg/dL    Alkaline Phosphatase 86 40 - 150 U/L     (H) 0 - 45 U/L     (H) 0 - 50 U/L    Protein Total 7.6 6.8 - 8.8 g/dL    Albumin 3.5 3.4 - 5.0 g/dL    Bilirubin Total 0.5 0.2 - 1.3 mg/dL    GFR Estimate >90 >60 mL/min/1.73m2   Lipase     Status: Normal    Collection Time: 04/08/22 11:22 PM   Result Value Ref Range    Lipase 93 73 - 393 U/L   HCG qualitative Blood     Status: Normal    Collection Time:  04/08/22 11:22 PM   Result Value Ref Range    hCG Serum Qualitative Negative Negative   CBC with platelets and differential     Status: Abnormal    Collection Time: 04/08/22 11:22 PM   Result Value Ref Range    WBC Count 13.6 (H) 4.0 - 11.0 10e3/uL    RBC Count 3.71 (L) 3.80 - 5.20 10e6/uL    Hemoglobin 11.3 (L) 11.7 - 15.7 g/dL    Hematocrit 34.4 (L) 35.0 - 47.0 %    MCV 93 78 - 100 fL    MCH 30.5 26.5 - 33.0 pg    MCHC 32.8 31.5 - 36.5 g/dL    RDW 12.8 10.0 - 15.0 %    Platelet Count 214 150 - 450 10e3/uL    % Neutrophils 69 %    % Lymphocytes 18 %    % Monocytes 8 %    % Eosinophils 3 %    % Basophils 1 %    % Immature Granulocytes 1 %    NRBCs per 100 WBC 0 <1 /100    Absolute Neutrophils 9.5 (H) 1.6 - 8.3 10e3/uL    Absolute Lymphocytes 2.5 0.8 - 5.3 10e3/uL    Absolute Monocytes 1.1 0.0 - 1.3 10e3/uL    Absolute Eosinophils 0.4 0.0 - 0.7 10e3/uL    Absolute Basophils 0.1 0.0 - 0.2 10e3/uL    Absolute Immature Granulocytes 0.1 <=0.4 10e3/uL    Absolute NRBCs 0.0 10e3/uL   D dimer quantitative     Status: Abnormal    Collection Time: 04/08/22 11:22 PM   Result Value Ref Range    D-Dimer Quantitative 0.61 (H) 0.00 - 0.50 ug/mL FEU    Narrative    This D-dimer assay is intended for use in conjunction with a clinical pretest probability assessment model to exclude pulmonary embolism (PE) and deep venous thrombosis (DVT) in outpatients suspected of PE or DVT. The cut-off value is 0.50 ug/mL FEU.   Troponin I     Status: Normal    Collection Time: 04/08/22 11:22 PM   Result Value Ref Range    Troponin I High Sensitivity 4 <54 ng/L   UA with Microscopic reflex to Culture     Status: Abnormal    Collection Time: 04/09/22 12:14 AM    Specimen: Urine, Midstream   Result Value Ref Range    Color Urine Yellow Colorless, Straw, Light Yellow, Yellow    Appearance Urine Slightly Cloudy (A) Clear    Glucose Urine >499 (A) Negative mg/dL    Bilirubin Urine Negative Negative    Ketones Urine Negative Negative mg/dL    Specific  Gravity Urine 1.024 1.003 - 1.035    Blood Urine Negative Negative    pH Urine 5.0 5.0 - 7.0    Protein Albumin Urine Negative Negative mg/dL    Urobilinogen Urine Normal Normal, 2.0 mg/dL    Nitrite Urine Negative Negative    Leukocyte Esterase Urine Small (A) Negative    Bacteria Urine Few (A) None Seen /HPF    Mucus Urine Present (A) None Seen /LPF    RBC Urine 1 <=2 /HPF    WBC Urine 18 (H) <=5 /HPF    Squamous Epithelials Urine 3 (H) <=1 /HPF    Narrative    Urine Culture ordered based on laboratory criteria   Lactic acid whole blood     Status: Abnormal    Collection Time: 04/09/22 12:52 AM   Result Value Ref Range    Lactic Acid 2.7 (H) 0.7 - 2.0 mmol/L   Ketone Beta-Hydroxybutyrate Quantitative     Status: Normal    Collection Time: 04/09/22 12:52 AM   Result Value Ref Range    Ketone (Beta-Hydroxybutyrate) Quantitative 0.1 0.0 - 0.6 mmol/L   CT Chest Pulmonary Embolism w Contrast     Status: None    Collection Time: 04/09/22  2:01 AM    Narrative    EXAM: CT CHEST WITH CONTRAST - PULMONARY EMBOLISM PROTOCOL   LOCATION: [facility]  DATE/TIME: 4/9/2022 1:43 AM    INDICATION: Shortness of breath. Positive D-dimer.  COMPARISON: 3/3/2022 - CT abdomen and pelvis.    TECHNIQUE: CT angiogram chest during pulmonary arterial phase injection IV contrast. Dose reduction techniques were used.   CONTRAST: 92 mL Isovue-370.    FINDINGS:    ANGIOGRAM CHEST: No visualized pulmonary embolus. The thoracic aorta is normal in caliber without dissection.    LUNGS AND PLEURA: A few curvilinear opacities scattered in both lungs likely represent atelectasis or scarring. The lungs are otherwise clear. A trace amount of right pleural fluid.    MEDIASTINUM/AXILLAE: Unremarkable.    CORONARY ARTERY CALCIFICATION: Absent.    MUSCULOSKELETAL: No acute findings.    UPPER ABDOMEN: The liver is prominent in size. Moderate diffuse fatty infiltration of the liver.      Impression    IMPRESSION:   1. A trace amount  of right pleural fluid.  2. The lungs are clear.  3. No visualized pulmonary embolus.    Glucose by meter     Status: Abnormal    Collection Time: 04/09/22  3:08 AM   Result Value Ref Range    GLUCOSE BY METER POCT 156 (H) 70 - 99 mg/dL   Lactic acid whole blood     Status: Abnormal    Collection Time: 04/09/22  3:11 AM   Result Value Ref Range    Lactic Acid 2.5 (H) 0.7 - 2.0 mmol/L       ED MEDICATIONS:   Medications   sodium chloride 0.9% infusion (has no administration in time range)   0.9% sodium chloride BOLUS (0 mLs Intravenous Stopped 4/9/22 0134)   iopamidol (ISOVUE-370) solution 92 mL (92 mLs Intravenous Given 4/9/22 0144)   sodium chloride 0.9 % bag 500mL for CT scan flush use (100 mLs Intravenous Given 4/9/22 0144)   0.9% sodium chloride BOLUS (0 mLs Intravenous Stopped 4/9/22 0310)   cefTRIAXone (ROCEPHIN) 1 g vial to attach to  mL bag for ADULTS or NS 50 mL bag for PEDS (0 g Intravenous Stopped 4/9/22 0327)         Impression:    ICD-10-CM    1. Hyperglycemia  R73.9    2. Dyspnea, unspecified type  R06.00        Plan:    Pending studies include repeat lactic acid. Patient has received IV fluids and is not thought to be septic. Anticipating improvement in lactic with a plan for discharge home after result returns.      3:37 AM Patient assessed: Patient resting comfortably.  He is tired and trying to rest.  Has been able to sleep here.  Denies any specific symptoms currently.  Repeat lactic acid is slightly improved although not quite normalized.  Urinalysis reviewed does show a dirty catch with 3 squamous epithelial cells and some white cells.  Patient denies any urinary symptoms currently.  Does have a slight white count with left shift however has no other infectious symptoms and has no fever. Minimal tachycardia improved.  Denies chest pain, cough, or difficulty breathing.  No abdominal pain.  Patient is concerned regarding her ongoing hyperglycemia.  Only on Metformin currently.  Advised  patient to follow-up with her primary doctor to talk about consideration for alternative therapies as well as dietary recommendations.  May benefit from a dietitian referral.  A follow-up appointment was scheduled for the patient next week with her primary doctor to help facilitate close follow-up as her next scheduled appointment is not for another month.      MD Nickolas Harrington Christopher James, MD  04/09/22 0351

## 2022-04-09 NOTE — DISCHARGE INSTRUCTIONS
Return if symptoms worsen or new symptoms develop.  Follow-up with primary care physician next week for recheck.  Follow-up for a sleep study and follow-up for further management of your blood sugars.  Push fluids.  If any chest pain abdominal pain fevers chills nausea vomiting or other symptoms present please return for recheck.  Urine culture was sent and if this comes back positive we will call to treat you for a urinary tract infection but at this point it is mild in nature and we will await culture results.

## 2022-04-09 NOTE — TELEPHONE ENCOUNTER
Maddy calls and says that her blood sugar was 300 around 7 pm and is 289 at 1924. Pt. Says that she has been very thirsty and hungry today. Pt. Also says that she has abdominal distension, which causes her to be SOB. Pt. Says that she is not going to call 911, but will have someone take her to the [facility] ER now. COVID 19 Nurse Triage Plan/Patient Instructions    Please be aware that novel coronavirus (COVID-19) may be circulating in the community. If you develop symptoms such as fever, cough, or SOB or if you have concerns about the presence of another infection including coronavirus (COVID-19), please contact your health care provider or visit [email or web address].     Disposition/Instructions    Call to EMS/911 recommended. Follow protocol based instructions.     Bring Your Own Device:  Please also bring your smart device(s) (smart phones, tablets, laptops) and their charging cables for your personal use and to communicate with your care team during your visit.    Thank you for taking steps to prevent the spread of this virus.  o Limit your contact with others.  o Wear a simple mask to cover your cough.  o Wash your hands well and often.    Resources    [facility]: About COVID-19: [email or web address]    CDC: What to Do If You're Sick: www.cdc.gov/coronavirus/2019-ncov/about/steps-when-sick.html    CDC: Ending Home Isolation: www.cdc.gov/coronavirus/2019-ncov/hcp/disposition-in-home-patients.html     CDC: Caring for Someone: www.cdc.gov/coronavirus/2019-ncov/if-you-are-sick/care-for-someone.html     [facility]: Interim Guidance for Hospital Discharge to Home: www.health.[facility].mn.us/diseases/coronavirus/hcp/hospdischarge.pdf    [facility] clinical trials (COVID-19 research studies): clinicalaffairs.[facility]/umn-clinical-trials     Below are the COVID-19 hotlines at the [facility] of Health ([facility]). Interpreters are available.   o For health questions: Call  [phone] or [phone] (7 a.m. to 7 p.m.)  o For questions about schools and childcare: Call 497-782-4016 or [phone] (7 a.m. to 7 p.m.)

## 2022-04-10 LAB — BACTERIA UR CULT: NORMAL

## 2022-04-10 NOTE — TELEPHONE ENCOUNTER
Maddy calls and says that she is having difficulty breathing. Pt. Says that she is SOB all the time. Pt. Says that she has been using her inhalers. Pt. Also says that she wants to know when her next appointment is. RN checked Epic and told pt. When her next appointment is. Pt. Voiced understanding. Pt. Says that she is not going to call 911 and will rest and monitor her breathing. COVID 19 Nurse Triage Plan/Patient Instructions    Please be aware that novel coronavirus (COVID-19) may be circulating in the community. If you develop symptoms such as fever, cough, or SOB or if you have concerns about the presence of another infection including coronavirus (COVID-19), please contact your health care provider or visit [email or web address].     Disposition/Instructions    Call to EMS/911 recommended. Follow protocol based instructions.     Bring Your Own Device:  Please also bring your smart device(s) (smart phones, tablets, laptops) and their charging cables for your personal use and to communicate with your care team during your visit.    Thank you for taking steps to prevent the spread of this virus.  o Limit your contact with others.  o Wear a simple mask to cover your cough.  o Wash your hands well and often.    Resources    [facility]: About COVID-19: [email or web address]    CDC: What to Do If You're Sick: www.cdc.gov/coronavirus/2019-ncov/about/steps-when-sick.html    CDC: Ending Home Isolation: www.cdc.gov/coronavirus/2019-ncov/hcp/disposition-in-home-patients.html     CDC: Caring for Someone: www.cdc.gov/coronavirus/2019-ncov/if-you-are-sick/care-for-someone.html     [facility]: Interim Guidance for Hospital Discharge to Home: www.health.[facility].mn.us/diseases/coronavirus/hcp/hospdischarge.pdf    [facility] clinical trials (COVID-19 research studies): clinicalaffairs.[facility]/umn-clinical-trials     Below are the COVID-19 hotlines at the [place] Department of Health ([facility]).  Interpreters are available.   o For health questions: Call 106-323-6128 or [phone] (7 a.m. to 7 p.m.)  o For questions about schools and childcare: Call 334-253-8528 or [phone] (7 a.m. to 7 p.m.)

## 2022-04-11 NOTE — ED PROVIDER NOTES
History     Chief Complaint   Patient presents with     Hyperglycemia     has been running 200-300     Shortness of Breath     for the last 1-2 years     HPI  Maddy Ortiz is a 36 year old female with past medical history significant for bipolar disorder depression diabetes type 2 who presents the emergency department complaining of generalized malaise, elevated blood sugars and dyspnea.  Patient states symptoms have been going on intermittently over the past several weeks and she is concerned her sugars are running high.  She has had decreased appetite and just is not feeling well.  She has had mild nausea.  She denies headache or visual changes has not had any neck pain.  She denies chest pain is a bit short of breath with some activity has been nauseated denies any abdominal pain.  She has not had any focal urinary symptoms denies any back pain has not any bowel or bladder dysfunction.  Patient has not had a rash.  She denies chance of pregnancy.  Patient is vague in her description of her symptoms.    Allergies:  Allergies   Allergen Reactions     Dust Mites Shortness Of Breath     Animal Dander      Other reaction(s): *Unknown     Metformin Fatigue     Patient is taking extended release at home as of 3/3/22     Mold      Other reaction(s): Runny Nose     Trees        Problem List:    Patient Active Problem List    Diagnosis Date Noted     Elevated LFTs 01/08/2022     Priority: Medium     Disorganized behavior 01/08/2022     Priority: Medium     Infection due to 2019 novel coronavirus 01/08/2022     Priority: Medium     Diabetes mellitus, type 2 (H) 12/13/2021     Priority: Medium     Fatty liver 11/05/2021     Priority: Medium     Umbilical hernia without obstruction and without gangrene 10/26/2021     Priority: Medium     Schizoaffective disorder (H) 07/13/2021     Priority: Medium     Morbid obesity (H) 01/02/2019     Priority: Medium     Paranoia (psychosis) (H) 08/24/2018     Priority: Medium      Gastroesophageal reflux disease without esophagitis 06/08/2018     Priority: Medium     Schizoaffective disorder, bipolar type (H) 05/07/2018     Priority: Medium     Encounter for IUD insertion 09/15/2017     Priority: Medium     inserted 9/15/17 by Krista Keller MD    LOT: [id number]  Exp: 04/20       Seasonal allergic rhinitis due to pollen 11/04/2016     Priority: Medium     Allergic rhinitis due to mold 11/04/2016     Priority: Medium     Allergic rhinitis due to animal dander 11/04/2016     Priority: Medium     Allergic rhinitis due to dust mite 11/04/2016     Priority: Medium     Depression with anxiety 01/26/2015     Priority: Medium     Insomnia 07/18/2013     Priority: Medium     Bipolar 1 disorder (H) 12/06/2012     Priority: Medium     Planning on seeing Purvi (psychiatric nurse)       Paranoid type delusional disorder (H) 11/14/2012     Priority: Medium     Psychosis (H) 10/16/2012     Priority: Medium     Animal dander allergy 05/09/2012     Priority: Medium     Dog and Cat       CARDIOVASCULAR SCREENING; LDL GOAL LESS THAN 160 05/09/2012     Priority: Medium        Past Medical History:    Past Medical History:   Diagnosis Date     Bipolar 1 disorder (H) 12/6/2012     Depressive disorder      Diabetes mellitus, type 2 (H) 12/13/2021     Paranoid type delusional disorder (H) 11/14/2012       Past Surgical History:    Past Surgical History:   Procedure Laterality Date     TONSILLECTOMY & ADENOIDECTOMY      as a child     TONSILLECTOMY & ADENOIDECTOMY         Family History:    Family History   Adopted: Yes   Problem Relation Age of Onset     Unknown/Adopted Mother      Unknown/Adopted Father      Unknown/Adopted Other      Hypertension Sister        Social History:  Marital Status:  Single [1]  Social History     Tobacco Use     Smoking status: Never Smoker     Smokeless tobacco: Never Used     Tobacco comment: around 2nd hand smoke   Vaping Use     Vaping Use: Never used   Substance Use Topics      "Alcohol use: Not Currently     Drug use: Not Currently        Medications:    acetaminophen (TYLENOL) 325 MG tablet  albuterol (PROAIR HFA/PROVENTIL HFA/VENTOLIN HFA) 108 (90 Base) MCG/ACT inhaler  ammonium lactate (LAC-HYDRIN) 12 % external cream  cetirizine (ZYRTEC) 10 MG tablet  EPINEPHrine (ANY BX GENERIC EQUIV) 0.3 MG/0.3ML injection 2-pack  fluticasone (FLONASE) 50 MCG/ACT nasal spray  gabapentin (NEURONTIN) 300 MG capsule  hydrOXYzine (ATARAX) 25 MG tablet  levothyroxine (SYNTHROID/LEVOTHROID) 50 MCG tablet  lithium ER (ESKALITH CR/LITHOBID) 450 MG CR tablet  lithium ER (LITHOBID) 300 MG CR tablet  Melatonin 10 MG TABS tablet  metFORMIN (GLUCOPHAGE-XR) 500 MG 24 hr tablet  mineral oil-hydrophilic petrolatum (AQUAPHOR) external ointment  OLANZapine (ZYPREXA) 20 MG tablet  risperiDONE microspheres ER (RISPERDAL CONSTA) 37.5 MG injection  terbinafine (LAMISIL AT) 1 % external cream          Review of Systems  All systems reviewed and other than pertinent positives and negatives in HPI all other systems are negative.  Physical Exam   BP: 127/79  Pulse: 106  Temp: 99.6  F (37.6  C)  Resp: 16  Height: 160 cm (5' 3\")  Weight: 114.8 kg (253 lb) (stated)  SpO2: 96 %      Physical Exam  Vitals and nursing note reviewed.   Constitutional:       Appearance: Normal appearance. She is obese. She is not ill-appearing, toxic-appearing or diaphoretic.      Comments: Mild generalized distress.   HENT:      Head: Normocephalic and atraumatic.      Nose: Nose normal.      Mouth/Throat:      Mouth: Mucous membranes are moist.      Pharynx: Oropharynx is clear. No oropharyngeal exudate or posterior oropharyngeal erythema.   Eyes:      Conjunctiva/sclera: Conjunctivae normal.   Cardiovascular:      Rate and Rhythm: Normal rate and regular rhythm.      Pulses: Normal pulses.      Heart sounds: Normal heart sounds.   Pulmonary:      Effort: Pulmonary effort is normal.      Breath sounds: No wheezing, rhonchi or rales.      Comments: " Breath sounds slightly decreased at bases.  Chest:      Chest wall: No tenderness.   Abdominal:      General: Abdomen is flat. Bowel sounds are normal. There is no distension.      Palpations: Abdomen is soft.      Tenderness: There is no abdominal tenderness. There is no right CVA tenderness or left CVA tenderness.   Musculoskeletal:         General: No tenderness. Normal range of motion.      Cervical back: Normal range of motion and neck supple. No rigidity or tenderness.      Right lower leg: No edema.      Left lower leg: No edema.   Skin:     General: Skin is warm and dry.      Capillary Refill: Capillary refill takes less than 2 seconds.      Findings: No rash.   Neurological:      General: No focal deficit present.      Mental Status: She is alert and oriented to person, place, and time.      Motor: No weakness.      Coordination: Coordination normal.   Psychiatric:         Mood and Affect: Mood normal.      Comments: There is no hallucinations or delusional thinking.         ED Course                 Procedures              EKG Interpretation:      Interpreted by Jay Jay Huerta MD  Rhythm: sinus tachycardia  Rate: 100-110  Axis: Normal  Ectopy: none  Conduction: normal  ST Segments/ T Waves: Non-specific ST-T wave changes  Q Waves: none  Comparison to prior: Unchanged from 4/24/2018 except mild tachycardia    Clinical Impression: Sinus tachycardia with no ST-T wave changes.      Critical Care time:  none               No results found for this or any previous visit (from the past 24 hour(s)).    Medications   0.9% sodium chloride BOLUS (0 mLs Intravenous Stopped 4/9/22 0134)   iopamidol (ISOVUE-370) solution 92 mL (92 mLs Intravenous Given 4/9/22 0144)   sodium chloride 0.9 % bag 500mL for CT scan flush use (100 mLs Intravenous Given 4/9/22 0144)   0.9% sodium chloride BOLUS (0 mLs Intravenous Stopped 4/9/22 0310)   cefTRIAXone (ROCEPHIN) 1 g vial to attach to  mL bag for ADULTS or NS 50 mL bag  for PEDS (0 g Intravenous Stopped 4/9/22 0327)     Results for orders placed or performed during the hospital encounter of 04/08/22   CT Chest Pulmonary Embolism w Contrast    Narrative    EXAM: CT CHEST WITH CONTRAST - PULMONARY EMBOLISM PROTOCOL   LOCATION: [facility]  DATE/TIME: 4/9/2022 1:43 AM    INDICATION: Shortness of breath. Positive D-dimer.  COMPARISON: 3/3/2022 - CT abdomen and pelvis.    TECHNIQUE: CT angiogram chest during pulmonary arterial phase injection IV contrast. Dose reduction techniques were used.   CONTRAST: 92 mL Isovue-370.    FINDINGS:    ANGIOGRAM CHEST: No visualized pulmonary embolus. The thoracic aorta is normal in caliber without dissection.    LUNGS AND PLEURA: A few curvilinear opacities scattered in both lungs likely represent atelectasis or scarring. The lungs are otherwise clear. A trace amount of right pleural fluid.    MEDIASTINUM/AXILLAE: Unremarkable.    CORONARY ARTERY CALCIFICATION: Absent.    MUSCULOSKELETAL: No acute findings.    UPPER ABDOMEN: The liver is prominent in size. Moderate diffuse fatty infiltration of the liver.      Impression    IMPRESSION:   1. A trace amount of right pleural fluid.  2. The lungs are clear.  3. No visualized pulmonary embolus.          Assessments & Plan (with Medical Decision Making) records were reviewed.  Labs were obtained.  EKG revealed a sinus tachycardia without other abnormality.  Patient was given IV fluids.  Patient's white count was 13.6 hemoglobin 11.3.  Platelet count was 214.  Comprehensive metabolic panel significant for glucose of 239 with mild elevation of AST and ALT.  UA with possible UTI.  Urine culture sent patient covered with ceftriaxone.  Troponin within normal limits.  Ketone 0.1.  Lactic acid was elevated at 2.7.  I do not think patient is septic I feel this is more likely secondary to her elevated glucose.  Due to elevated D-dimer CT PE protocol was obtained.  This revealed a trace  amount of right pleural fluid no D-dimer lungs are clear.  IV fluids were continued on patient.  Patient feeling better with the IV fluids feels comfortable going home.  I Марина recheck lactic acid and if this is improved we will have patient discharged home with close follow-up patient was signed out to Dr. Olmos for further evaluation and care.     I have reviewed the nursing notes.    I have reviewed the findings, diagnosis, plan and need for follow up with the patient.       Discharge Medication List as of 4/9/2022  3:58 AM          Final diagnoses:   Hyperglycemia   Dyspnea, unspecified type   Hypertension, unspecified type       4/8/2022   [facility] EMERGENCY DEPT     Eleanore, Jay Jay Okeefe MD  04/11/22 7327

## 2022-04-12 ENCOUNTER — VIRTUAL VISIT (OUTPATIENT)
Dept: SURGERY | Facility: CLINIC | Age: 37
End: 2022-04-12
Payer: MEDICARE

## 2022-04-12 ENCOUNTER — TELEPHONE (OUTPATIENT)
Dept: FAMILY MEDICINE | Facility: CLINIC | Age: 37
End: 2022-04-12

## 2022-04-12 VITALS — BODY MASS INDEX: 44.83 KG/M2 | HEIGHT: 63 IN | WEIGHT: 253 LBS

## 2022-04-12 DIAGNOSIS — E11.9 TYPE 2 DIABETES MELLITUS WITHOUT COMPLICATION, WITHOUT LONG-TERM CURRENT USE OF INSULIN (H): ICD-10-CM

## 2022-04-12 DIAGNOSIS — K76.0 FATTY LIVER: ICD-10-CM

## 2022-04-12 DIAGNOSIS — E66.01 MORBID OBESITY (H): Primary | ICD-10-CM

## 2022-04-12 PROCEDURE — 99215 OFFICE O/P EST HI 40 MIN: CPT | Mod: 95 | Performed by: FAMILY MEDICINE

## 2022-04-12 NOTE — LETTER
4/12/2022         RE: Maddy Ortiz  [address]  [place] [address]        Dear Colleague,    Thank you for referring your patient, Maddy Ortiz, to the [facility] [place]. Please see a copy of my visit note below.        B[facility] Non Surgical Follow up Visit   Date of visit: 4/8/2022  Physician: LONDON Serna MD, MD  Primary Care is Shirley Freeman.  Maddy Ortiz   36 year old  female     Initial Weigfht: 202#  Initial BMI: 34.14  Today's Weight:   Wt Readings from Last 1 Encounters:   04/12/22 114.8 kg (253 lb)     Body mass index is 44.83 kg/m .           Assessment and Plan   Assessment: Maddy is a 36 year old year old female who presents for medical weight management.      Plan:    1. Morbid obesity (H)  Patient was congratulated on the healthy changes she has made thus far. Healthy habits to assist with further weight loss were discussed. She needs to increase her vegetable intake, increase her protein intake, and decrease her starches and fats. She will continue the metformin. We discussed the patient's co-morbid conditions including type 2 DM and fatty liver disease. These likely will improve with healthy habits and weight loss.    2. Type 2 diabetes mellitus without complication, without long-term current use of insulin (H)  This may improve with healthy habits and weight loss. She would be a good candidate for liraglutide or semiglutide. I am not comfortable prescribing these unless she gets on some form of birth control. She will discuss with her primary MD.    3. Fatty liver  This may improve with healthy habits and weight loss.    Follow up in 3 months with myself, in 1 month with our dietician           INTERIM HISTORY  Patient is taking metformin and she is tolerating it. She is concerned about her diabetes. Her blood sugars have been running high (200-300). She states the people at the grocery store don't want her  to buy vegetables. She complains of hunger.     DIETARY HISTORY  Meals Per Day: 3  Eating Protein First?: no  Food Diary: B:rice cakes with peanut butter or nutella or leftovers L:chips and pepperoni sandwich D:pasta salad with vegetables, chicken nuggets  Typical Snack: cucumber  Fluid Intake: not sure  Portion Control: not sure  Calorie Containing Beverages: none  Typical Protein Food Choices: not sure      Positive Changes Since Last Visit: none  Struggling With: feels she doesn't have enough time to do this    Knowledgeable in Reading Food Labels: no  Getting Adequate Protein: no      PHYSICAL ACTIVITY PATTERNS:  None- neuropathy    REVIEW OF SYSTEMS  GENERAL/CONSTITUTIONAL:  Fatigue: yes  PULMONARY:  Dyspnea on exertion: she is not exerting herself  PSYCHIATRIC:  Moods: irritable  MUSCULOSKELETAL/RHEUMATOLOGIC  Arthralgias: yes  Myalgias: yes  ENDOCRINE:  Monitoring Blood Sugars: yes  Sugars Well Controlled: no  No personal or family history of medullary thyroid cancer no  :  Birth control: none       Patient Profile   Social History     Social History Narrative    One child    Education: some college        October 21, 2021    ENVIRONMENTAL HISTORY: [place] The home is heated with a electric furnace. They do not have central air conditioning, does have box air conditioner in the wall and has air purifier. The patient's bedroom is furnished with stuffed animals in bed, carpeting in bedroom and fabric window coverings. Pets inside the apartment includes 1 cat. There is history of cockroach or mice infestation. There are no smokers in the house.  The apartment does not have a basement.         Past Medical History   Past Medical History:   Diagnosis Date     Bipolar 1 disorder (H) 12/6/2012     Depressive disorder      Diabetes mellitus, type 2 (H) 12/13/2021     Paranoid type delusional disorder (H) 11/14/2012     Patient Active Problem List   Diagnosis      "Animal dander allergy     CARDIOVASCULAR SCREENING; LDL GOAL LESS THAN 160     Psychosis (H)     Paranoid type delusional disorder (H)     Bipolar 1 disorder (H)     Insomnia     Depression with anxiety     Seasonal allergic rhinitis due to pollen     Allergic rhinitis due to mold     Allergic rhinitis due to animal dander     Allergic rhinitis due to dust mite     Encounter for IUD insertion     Schizoaffective disorder, bipolar type (H)     Gastroesophageal reflux disease without esophagitis     Paranoia (psychosis) (H)     Morbid obesity (H)     Schizoaffective disorder (H)     Umbilical hernia without obstruction and without gangrene     Fatty liver     Diabetes mellitus, type 2 (H)     Elevated LFTs     Disorganized behavior     Infection due to 2019 novel coronavirus       Past Surgical History  She has a past surgical history that includes tonsillectomy & adenoidectomy and tonsillectomy & adenoidectomy.     Examination   Ht 1.6 m (5' 2.99\")   Wt 114.8 kg (253 lb)   BMI 44.83 kg/m    GENERAL: Healthy, alert and no distress  EYES: Eyes grossly normal to inspection.  No discharge or erythema, or obvious scleral/conjunctival abnormalities.  RESP: No audible wheeze, cough, or visible cyanosis.  No visible retractions or increased work of breathing.    SKIN: Visible skin clear. No significant rash, abnormal pigmentation or lesions.  NEURO: Cranial nerves grossly intact.  Mentation and speech appropriate for age.  PSYCH: Mentation appears normal, affect normal/bright, judgement and insight intact, normal speech and appearance well-groomed.       Counseling:   We reviewed the important post op bariatric recommendations:  -eating 3 meals daily  -eating protein first, getting >60gm protein daily  -eating slowly, chewing food well  -avoiding/limiting calorie containing beverages  -limiting starchy vegetables and carbohydrates, choosing wheat, not white with breads,   crackers, pastas, marquita, bagels, tortillas, " rice  -limiting restaurant or cafeteria eating to twice a week or less    We discussed the importance of restorative sleep and stress management in maintaining a healthy weight.  We discussed the National Weight Control Registry healthy weight maintenance strategies and ways to optimize metabolism.  We discussed the importance of physical activity including cardiovascular and strength training in maintaining a healthier weight.    Total time spent on the date of this encounter doing: chart review, review of test results, patient visit, physical exam, education, counseling, developing plan of care and documentation: 48 minutes        LONDON Serna MD  [facility]    Maddy Ortiz is 36 year old  female who presents for a billable video visit today.    How would you like to obtain your AVS? MyChart  If dropped from the video visit, the video invitation should be resent by: Send to e-mail at: [email or web address]  Will anyone else be joining your video visit? No      Video Start Time: 3:45 pm    Are there any specific questions or needs that you would like addressed at your visit today?     Pt c/o fatigue. Constant fatigue.          Video-Visit Details    Type of service:  Video Visit    Video End Time (time video stopped): 4:06 PM  Originating Location (pt. Location): Home    Distant Location (provider location):  [facility] [place]     Platform used for Video Visit: Perri           Again, thank you for allowing me to participate in the care of your patient.        Sincerely,        LONDON Serna MD

## 2022-04-13 ENCOUNTER — VIRTUAL VISIT (OUTPATIENT)
Dept: FAMILY MEDICINE | Facility: CLINIC | Age: 37
End: 2022-04-13
Payer: MEDICARE

## 2022-04-13 DIAGNOSIS — J30.1 SEASONAL ALLERGIC RHINITIS DUE TO POLLEN: ICD-10-CM

## 2022-04-13 DIAGNOSIS — F25.0 SCHIZOAFFECTIVE DISORDER, BIPOLAR TYPE (H): ICD-10-CM

## 2022-04-13 DIAGNOSIS — E11.9 TYPE 2 DIABETES MELLITUS WITHOUT COMPLICATION, WITHOUT LONG-TERM CURRENT USE OF INSULIN (H): Primary | ICD-10-CM

## 2022-04-13 DIAGNOSIS — Z09 HOSPITAL DISCHARGE FOLLOW-UP: ICD-10-CM

## 2022-04-13 LAB — HBA1C MFR BLD: 7.3 % (ref 0–5.6)

## 2022-04-13 PROCEDURE — 99214 OFFICE O/P EST MOD 30 MIN: CPT | Performed by: NURSE PRACTITIONER

## 2022-04-13 RX ORDER — CETIRIZINE HYDROCHLORIDE 10 MG/1
10 TABLET ORAL
Qty: 30 TABLET | Refills: 1 | Status: SHIPPED | OUTPATIENT
Start: 2022-04-13 | End: 2022-05-11

## 2022-04-13 NOTE — PROGRESS NOTES
Maddy is a 36 year old who is being evaluated via a billable video visit.      How would you like to obtain your AVS? MyChart  If the video visit is dropped, the invitation should be resent by: Text to cell phone: [phone]  Will anyone else be joining your video visit? No      Video Start Time: 11:11 AM    Assessment & Plan     Type 2 diabetes mellitus without complication, without long-term current use of insulin (H)  Poorly controlled blood sugars.  She is due for an A1c.  Continue Metformin as prescribed.  I reached out to our diabetic educator who will resume following her.  As I know this patient well, I would support the use of a GLP-1 to treat her diabetes and help with management of her obesity.  All diabetic medications, other than insulin, have a recommendation to use contraception with concurrent use; patient understands this recommendation and the risks associated with an unplanned pregnancy.  - Hemoglobin A1c; Future  - blood glucose (NO BRAND SPECIFIED) test strip; Use to test blood sugar 6 times daily or as directed.    Schizoaffective disorder, bipolar type (H)  Improving.  Managed by psychiatry.    Seasonal allergic rhinitis due to pollen  - cetirizine (ZYRTEC) 10 MG tablet; Take 1 tablet (10 mg) by mouth nightly as needed for allergies or rhinitis    Hospital discharge follow-up      The risks, benefits and treatment options of prescribed medications or other treatments have been discussed with the patient. The patient verbalized their understanding and should call or follow up if no improvement or if they develop further problems.    PHILL Luo [facility]          Subjective   Maddy is a 36 year old who presents for the following health issues     HPI     Post Discharge Outreach 3/31/2022   Admission Date 3/3/2022   Reason for Admission abdominal pain, difficulty thinking post medication change   Discharge Date 3/30/2022   Discharge Diagnosis psychosis, SI    How are you doing now that you are home? Okay   How are your symptoms? (Red Flag symptoms escalate to triage hotline per guidelines) Improved   Do you feel your condition is stable enough to be safe at home until your provider visit? Yes   Does the patient have their discharge instructions?  Yes   Does the patient have questions regarding their discharge instructions?  No   Were you started on any new medications or were there changes to any of your previous medications?  Yes   Does the patient have all of their medications? Yes   Do you have questions regarding any of your medications?  No   Do you have all of your needed medical supplies or equipment (DME)?  (i.e. oxygen tank, CPAP, cane, etc.) Yes   Discharge follow-up appointment scheduled within 14 calendar days?  No   Discharge Follow Up Appointment Date 4/20/2022   Discharge Follow Up Appointment Scheduled with? Specialty Care Provider     Hospital Follow-up Visit:    Hospital/Nursing Home/IP Rehab Facility: [facility]  Date of Admission: 3/3/22  Date of Discharge: 3/30/22  Reason(s) for Admission: psychiatric    Also had an ER visit on 4/8 that she is to follow up on  High blood sugars and shortness of breath          Was your hospitalization related to COVID-19? No   Problems taking medications regularly:  None  Medication changes since discharge: None  Problems adhering to non-medication therapy:      Summary of hospitalization:  [facility] discharge summary reviewed  Diagnostic Tests/Treatments reviewed.  Follow up needed: psychiatry   Other Healthcare Providers Involved in Patient s Care:         None  Update since discharge: improved.       Post Discharge Medication Reconciliation: discharge medications reconciled, continue medications without change.  Plan of care communicated with patient              HPI: Patient amended to mental health unit at the [facility] due to  "decompensated schizoaffective disorder bipolar type.  She was admitted on March 3 and discharged on March 30 in improving condition.  Patient reports that she is feeling better on her current medications.  She reports that she has psychiatry follow-up in place.    Patient has also had difficulty controlling her blood sugars.  Continues to take metformin as prescribed.  She is checking her blood sugars 5-6 times per day.  She read recent numbers off to me during this video visit and they were ranging from 190 - 250.  She had a virtual visit with a weight loss doctor yesterday.  Recommended that patient start GLP-1; however required patient to be on contraception.  Patient does not want to be on contraception as she feels she has negative side effects from it.  She reports that she is committed to abstinence and is not in a relationship at this time.  She is upset as she does not feel she should be forced to be on birth control just to get a medication that is medically necessary for her.  She is very concerned about her blood sugars.  This led her to be in the ER on April 8.  She previously worked with diabetic education and would like to do so again.            Review of Systems   Constitutional, HEENT, cardiovascular, pulmonary, gi and gu systems are negative, except as otherwise noted.      Objective    Vitals - Patient Reported  Weight (Patient Reported): 116.1 kg (256 lb)  Height (Patient Reported): 160 cm (5' 3\")  BMI (Based on Pt Reported Ht/Wt): 45.35  Pain Score: Severe Pain (6)  Pain Loc: Low Back        Physical Exam   GENERAL: Healthy, alert and no distress  EYES: Eyes grossly normal to inspection.  No discharge or erythema, or obvious scleral/conjunctival abnormalities.  RESP: No audible wheeze, cough, or visible cyanosis.  No visible retractions or increased work of breathing.    SKIN: Visible skin clear. No significant rash, abnormal pigmentation or lesions.  NEURO: Cranial nerves grossly intact.  " Mentation and speech appropriate for age.  PSYCH: Mentation appears normal, affect normal/bright, judgement and insight intact, normal speech and appearance well-groomed.                Video-Visit Details    Type of service:  Video Visit    Video End Time:11:37 AM    Originating Location (pt. Location): Home    Distant Location (provider location):  [facility]     Platform used for Video Visit: [employer]

## 2022-04-14 ENCOUNTER — VIRTUAL VISIT (OUTPATIENT)
Dept: SURGERY | Facility: CLINIC | Age: 37
End: 2022-04-14
Payer: MEDICARE

## 2022-04-14 DIAGNOSIS — E11.9 TYPE 2 DIABETES MELLITUS WITHOUT COMPLICATION, WITHOUT LONG-TERM CURRENT USE OF INSULIN (H): Primary | ICD-10-CM

## 2022-04-14 DIAGNOSIS — E66.01 OBESITY, CLASS III, BMI 40-49.9 (MORBID OBESITY) (H): ICD-10-CM

## 2022-04-14 PROCEDURE — 97803 MED NUTRITION INDIV SUBSEQ: CPT | Mod: 95 | Performed by: DIETITIAN, REGISTERED

## 2022-04-14 NOTE — LETTER
4/14/2022         RE: Maddy Ortiz  [address]  [place] [address]        Dear Colleague,    Thank you for referring your patient, Maddy Ortiz, to the [facility] [place]. Please see a copy of my visit note below.    Maddy Ortiz is a 36 year old who is being evaluated via a billable video visit.      How would you like to obtain your AVS? MyChart  If the video visit is dropped, the invitation should be resent by: Send to e-mail at: [email or web address]  Will anyone else be joining your video visit? No      Video Start Time: 10:30a      Medical  Weight Loss Follow-Up Diet Evaluation  Assessment:  Maddy is presenting today for a follow up weight management nutrition consultation. Pt has had an initial appointment with Dr. Serna  Weight loss medication: metformin.   Pt's weight is 253lb   Initial weight: 202lb  Weight change: up 51lb    Changes and Difficulties 10/15/2018   I have made the following changes to my diet since my last visit: soup no pizza   With regards to my diet, I am still struggling with: none   I have made the following changes to my activity/exercise since my last visit: walking   With regards to my activity/exercise, I am still struggling with: tiredness     BMI: 44.83  Ideal body weight: 52.4 kg (115 lb 7.7 oz)  Adjusted ideal body weight: 77.3 kg (170 lb 7.8 oz)    Estimated RMR (Lynn-St Jeor equation):   1820 kcals x 1.2 (sedentary) = 2184 kcals (for weight maintenance)  Recommended Protein Intake: 60-80 grams of protein/day  Patient Active Problem List:  Patient Active Problem List   Diagnosis     Animal dander allergy     CARDIOVASCULAR SCREENING; LDL GOAL LESS THAN 160     Psychosis (H)     Paranoid type delusional disorder (H)     Bipolar 1 disorder (H)     Insomnia     Depression with anxiety     Seasonal allergic rhinitis due to pollen     Allergic rhinitis due to mold     Allergic rhinitis due to animal dander      Allergic rhinitis due to dust mite     Encounter for IUD insertion     Schizoaffective disorder, bipolar type (H)     Gastroesophageal reflux disease without esophagitis     Paranoia (psychosis) (H)     Morbid obesity (H)     Schizoaffective disorder (H)     Umbilical hernia without obstruction and without gangrene     Fatty liver     Diabetes mellitus, type 2 (H)     Elevated LFTs     Disorganized behavior     Infection due to 2019 novel coronavirus     Diabetes: has been checking blood glucose quite a bit- 7-8 times per day- running 180-300s- taking metformin    Progress on goals from last visit: struggling with neuropathy- trying to take it easy in terms of movement  +started drinking slimfast  +would like to try semaglutide but needs to be on birth control- would rather practice abstinence- has not had period in a while and would like to get this back    +frozen veggies for lunch and dinner, less carbs- reports hunger between meals  _states she has been feeling a bit low mentally  +struggles to balance appointments, supporting her daughter and eating better- under a lot of stress financially  +no access to food zapata- has food stamps- currently ordering from [employer]    Dietary Recall:  Breakfast: couple rice cakes- didn't want to eat a lot as her blood glucose was high  Lunch: unable to recall- was waiting for food delivery  Dinner: frozen pizza, 2 sandwiches on whole wheat bread- ham and cheese, chicken salad- states she was really hungry  Beverages: states she is constantly thirsty- drinking a lot of water  Exercise: not a lot due to neuropathy    Nutrition Diagnosis:  Overweight/Obesity (NC 3.3) related to overeating and poor lifestyle habits as evidenced by patient's report of inconsistent meals, large portions, frequent snacking of sweets, lack of activity and BMI 44.83      Intervention:  1. Food and/or nutrient delivery: encouraged three meals per day even if blood glucose is high- encouraged her to  track her food for a few weeks and bring to diabetes educator for more fine tuning of plan- increased protein and fiber  2. Nutrition counseling: support for success- encouraged patient to simplify diet- eat three meals per day and have protein with every meal    Monitoring/Evaluation:    Goals:  1. Eat three meals per day- protein shake can be a meal replacement  2. Protein with every meal    Patient to follow up in 1 month(s) with bariatrician and 2-3 month(s) with RD      Video-Visit Details    Type of service:  Video Visit    Video End Time:10:54 AM    Originating Location (pt. Location): Home    Distant Location (provider location):  [facility] [place]     Platform used for Video Visit: Perri FAULKNER RD        Again, thank you for allowing me to participate in the care of your patient.        Sincerely,        KARLA FAULKNER RD

## 2022-04-14 NOTE — PROGRESS NOTES
Maddy Ortiz is a 36 year old who is being evaluated via a billable video visit.      How would you like to obtain your AVS? MyChart  If the video visit is dropped, the invitation should be resent by: Send to e-mail at: [email or web address]  Will anyone else be joining your video visit? No      Video Start Time: 10:30a      Medical  Weight Loss Follow-Up Diet Evaluation  Assessment:  Maddy is presenting today for a follow up weight management nutrition consultation. Pt has had an initial appointment with Dr. Serna  Weight loss medication: metformin.   Pt's weight is 253lb   Initial weight: 202lb  Weight change: up 51lb    Changes and Difficulties 10/15/2018   I have made the following changes to my diet since my last visit: soup no pizza   With regards to my diet, I am still struggling with: none   I have made the following changes to my activity/exercise since my last visit: walking   With regards to my activity/exercise, I am still struggling with: tiredness     BMI: 44.83  Ideal body weight: 52.4 kg (115 lb 7.7 oz)  Adjusted ideal body weight: 77.3 kg (170 lb 7.8 oz)    Estimated RMR (Mills-St Jeor equation):   1820 kcals x 1.2 (sedentary) = 2184 kcals (for weight maintenance)  Recommended Protein Intake: 60-80 grams of protein/day  Patient Active Problem List:  Patient Active Problem List   Diagnosis     Animal dander allergy     CARDIOVASCULAR SCREENING; LDL GOAL LESS THAN 160     Psychosis (H)     Paranoid type delusional disorder (H)     Bipolar 1 disorder (H)     Insomnia     Depression with anxiety     Seasonal allergic rhinitis due to pollen     Allergic rhinitis due to mold     Allergic rhinitis due to animal dander     Allergic rhinitis due to dust mite     Encounter for IUD insertion     Schizoaffective disorder, bipolar type (H)     Gastroesophageal reflux disease without esophagitis     Paranoia (psychosis) (H)     Morbid obesity (H)     Schizoaffective disorder (H)     Umbilical hernia without  obstruction and without gangrene     Fatty liver     Diabetes mellitus, type 2 (H)     Elevated LFTs     Disorganized behavior     Infection due to 2019 novel coronavirus     Diabetes: has been checking blood glucose quite a bit- 7-8 times per day- running 180-300s- taking metformin    Progress on goals from last visit: struggling with neuropathy- trying to take it easy in terms of movement  +started drinking slimfast  +would like to try semaglutide but needs to be on birth control- would rather practice abstinence- has not had period in a while and would like to get this back    +frozen veggies for lunch and dinner, less carbs- reports hunger between meals  _states she has been feeling a bit low mentally  +struggles to balance appointments, supporting her daughter and eating better- under a lot of stress financially  +no access to food zapata- has food stamps- currently ordering from [facility]    Dietary Recall:  Breakfast: couple rice cakes- didn't want to eat a lot as her blood glucose was high  Lunch: unable to recall- was waiting for food delivery  Dinner: frozen pizza, 2 sandwiches on whole wheat bread- ham and cheese, chicken salad- states she was really hungry  Beverages: states she is constantly thirsty- drinking a lot of water  Exercise: not a lot due to neuropathy    Nutrition Diagnosis:  Overweight/Obesity (NC 3.3) related to overeating and poor lifestyle habits as evidenced by patient's report of inconsistent meals, large portions, frequent snacking of sweets, lack of activity and BMI 44.83      Intervention:  1. Food and/or nutrient delivery: encouraged three meals per day even if blood glucose is high- encouraged her to track her food for a few weeks and bring to diabetes educator for more fine tuning of plan- increased protein and fiber  2. Nutrition counseling: support for success- encouraged patient to simplify diet- eat three meals per day and have protein with every  meal    Monitoring/Evaluation:    Goals:  1. Eat three meals per day- protein shake can be a meal replacement  2. Protein with every meal    Patient to follow up in 1 month(s) with bariatrician and 2-3 month(s) with RD      Video-Visit Details    Type of service:  Video Visit    Video End Time:10:54 AM    Originating Location (pt. Location): Home    Distant Location (provider location):  [facility] [place]     Platform used for Video Visit: Perri FAULKNER RD

## 2022-04-20 ENCOUNTER — TELEPHONE (OUTPATIENT)
Dept: EDUCATION SERVICES | Facility: CLINIC | Age: 37
End: 2022-04-20
Payer: MEDICARE

## 2022-04-20 ENCOUNTER — VIRTUAL VISIT (OUTPATIENT)
Dept: EDUCATION SERVICES | Facility: CLINIC | Age: 37
End: 2022-04-20
Payer: MEDICARE

## 2022-04-20 DIAGNOSIS — E11.9 TYPE 2 DIABETES MELLITUS WITHOUT COMPLICATION, WITHOUT LONG-TERM CURRENT USE OF INSULIN (H): Primary | ICD-10-CM

## 2022-04-20 PROCEDURE — G0108 DIAB MANAGE TRN  PER INDIV: HCPCS | Mod: 95 | Performed by: DIETITIAN, REGISTERED

## 2022-04-20 NOTE — PROGRESS NOTES
"Diabetes Self-Management Education & Support    Presents for: Individual review  Type of Service: Telephone Visit  Originating Location (Patient Location): Home  Distant Location (Provider Location): Home  Mode of Communication:  Telephone  Telephone Visit Start Time: 306  Telephone Visit End Time (telephone visit stop time): 336  How would patient like to obtain AVS? Saloni    SUBJECTIVE/OBJECTIVE:  Presents for: Individual review  Accompanied by: Self  Diabetes education in the past 24mo: Yes  Focus of Visit: GLP-1 Start  GLP-1 Type: Victoza  Diabetes type: Type 2  Disease course: Stable  Other concerns:: None  Cultural Influences/Ethnic Background:  No    Diabetes Symptoms & Complications:  Not assessed at this visit     Patient Problem List and Family Medical History reviewed for relevant medical history, current medical status, and diabetes risk factors.    Vitals:  LMP  (LMP Unknown)   Estimated body mass index is 44.65 kg/m  as calculated from the following:    Height as of 4/12/22: 1.6 m (5' 2.99\").    Weight as of 4/25/22: 114.3 kg (252 lb).   Last 3 BP:   BP Readings from Last 3 Encounters:   04/25/22 (!) 136/92   04/09/22 (!) 143/85   03/30/22 105/74       History   Smoking Status     Former Smoker     Packs/day: 0.50     Types: Cigarettes   Smokeless Tobacco     Former User     Comment: around 2nd hand smoke       Labs:  Lab Results   Component Value Date    A1C 7.3 04/08/2022    A1C 4.7 06/09/2013     Lab Results   Component Value Date     04/09/2022     04/08/2022    GLC 88 08/17/2020     Lab Results   Component Value Date     03/04/2022     Direct Measure HDL   Date Value Ref Range Status   03/04/2022 33 (L) >=50 mg/dL Final   ]  GFR Estimate   Date Value Ref Range Status   04/08/2022 >90 >60 mL/min/1.73m2 Final     Comment:     Effective December 21, 2021 eGFRcr in adults is calculated using the 2021 CKD-EPI creatinine equation which includes age and gender (Scott et al., NEJM, " DOI: 10.1056/FHRBdc2780673)   08/17/2020 >90 >60 mL/min/[1.73_m2] Final     Comment:     Non  GFR Calc  Starting 12/18/2018, serum creatinine based estimated GFR (eGFR) will be   calculated using the Chronic Kidney Disease Epidemiology Collaboration   (CKD-EPI) equation.       GFR Estimate If Black   Date Value Ref Range Status   08/17/2020 >90 >60 mL/min/[1.73_m2] Final     Comment:      GFR Calc  Starting 12/18/2018, serum creatinine based estimated GFR (eGFR) will be   calculated using the Chronic Kidney Disease Epidemiology Collaboration   (CKD-EPI) equation.       Lab Results   Component Value Date    CR 0.63 04/08/2022    CR 0.80 08/17/2020     No results found for: MICROALBUMIN    Healthy Eating:  Healthy Eating Assessed Today: Yes    Being Active:  Being Active Assessed Today: No    Monitoring:  Monitoring Assessed Today: Yes    Taking Medications:  Diabetes Medication(s)     Biguanides       metFORMIN (GLUCOPHAGE-XR) 500 MG 24 hr tablet    Take 2 tablets (1,000 mg) by mouth 2 times daily (with meals)    Incretin Mimetic Agents (GLP-1 Receptor Agonists)       liraglutide (VICTOZA) 18 MG/3ML solution    Inject 1.2 mg Subcutaneous daily Inject 0.6mg daily x 7 days, then increase to 1.2mg daily if tolerating)          Taking Medication Assessed Today: Yes    Problem Solving:   yes     Reducing Risks:  Reducing Risks Assessed Today: No    Healthy Coping:  Healthy Coping Assessed Today: Yes  Emotional response to diabetes: Ready to learn  Stage of change: ACTION (Actively working towards change)  Patient Activation Measure Survey Score:  LANA Score (Last Two) 8/19/2014   LANA Raw Score 30   Activation Score 37.3   LANA Level 1     Diabetes knowledge and skills assessment:   Patient is knowledgeable in diabetes management concepts related to: Healthy Eating, Being Active, Monitoring, Taking Medication, Problem Solving, Reducing Risks and Healthy Coping  Continue education on the  following diabetes management concepts: Healthy Eating, Monitoring, Taking Medication and Reducing Risks  Based on learning assessment above, most appropriate setting for further diabetes education would be: Individual setting.    INTERVENTIONS:  Education provided today on:  AADE Self-Care Behaviors:  Diabetes Pathophysiology  Healthy Eating: continue positive changes increasing vegetables and watching portions   Being Active: relationship to blood glucose  Monitoring: log and interpret results and individual blood glucose targets  Taking Medication: victoza instructions   Problem Solving: low blood glucose - causes, signs/symptoms, treatment and prevention and when to call health care provider    Opportunities for ongoing education and support in diabetes-self management were discussed.  Pt verbalized understanding of concepts discussed and recommendations provided today.       ASSESSMENT:  PCP reached out to writer and discussed beginning a GLP1 as a second line treatment due to hyperglycemia.  Set visit today to review victoza.  Patient denies any of the contraindications to a GLP1 medication including a history of Pancreatitis, history of Medullary Thyroid Cancer, or Multiple Endocrine Neoplasia Syndrome Type 2.   Instructions for taking Victoza were provided today. Pen injection technique was taught, patient verbalized understanding and was able to perform an accurate return demonstration. Side effects were discussed, if patient has any abdominal pain, with or without nausea and/or vomiting, stop Victoza and call provider, clinic or go to the emergency room.  Discussed if planning on becoming pregnant or pregnant this medication must be stopped immediately and transitioned likley to insulin.  Pt verbalized understanding of concepts discussed and recommendations provided.  Additional instructions sent via [facility].  Blood sugars increasing; 200s fasting and having some 260-270s sometimes after meals.  Maddy is  trying to balance meals, not skip eating and increase vegetables.     Patient's most recent   Lab Results   Component Value Date    A1C 7.3 04/08/2022    A1C 4.7 06/09/2013    is not meeting goal of <7.0    PLAN  Continue with positive diet & lifestyle changes   Mychart weekly with blood sugar results after beginning Florence Hampton RD, LD, [facility]  Diabetes Education    Time Spent: 30 minutes  Encounter Type: Individual     A copy of this encounter was shared with the provider.

## 2022-04-20 NOTE — LETTER
"    4/20/2022         RE: Maddy Ortiz  [address]  [facility] [address]        Dear Colleague,    Thank you for referring your patient, Maddy Ortiz, to the [facility] [place]. Please see a copy of my visit note below.    Diabetes Self-Management Education & Support    Presents for: Individual review  Type of Service: Telephone Visit  Originating Location (Patient Location): Home  Distant Location (Provider Location): Home  Mode of Communication:  Telephone  Telephone Visit Start Time: 306  Telephone Visit End Time (telephone visit stop time): 336  How would patient like to obtain AVS? MyChart    SUBJECTIVE/OBJECTIVE:  Presents for: Individual review  Accompanied by: Self  Diabetes education in the past 24mo: Yes  Focus of Visit: GLP-1 Start  GLP-1 Type: Victoza  Diabetes type: Type 2  Disease course: Stable  Other concerns:: None  Cultural Influences/Ethnic Background:  No    Diabetes Symptoms & Complications:  Not assessed at this visit     Patient Problem List and Family Medical History reviewed for relevant medical history, current medical status, and diabetes risk factors.    Vitals:  LMP  (LMP Unknown)   Estimated body mass index is 44.65 kg/m  as calculated from the following:    Height as of 4/12/22: 1.6 m (5' 2.99\").    Weight as of 4/25/22: 114.3 kg (252 lb).   Last 3 BP:   BP Readings from Last 3 Encounters:   04/25/22 (!) 136/92   04/09/22 (!) 143/85   03/30/22 105/74       History   Smoking Status     Former Smoker     Packs/day: 0.50     Types: Cigarettes   Smokeless Tobacco     Former User     Comment: around 2nd hand smoke       Labs:  Lab Results   Component Value Date    A1C 7.3 04/08/2022    A1C 4.7 06/09/2013     Lab Results   Component Value Date     04/09/2022     04/08/2022    GLC 88 08/17/2020     Lab Results   Component Value Date     03/04/2022     Direct Measure HDL   Date Value Ref Range Status   03/04/2022 33 (L) >=50 mg/dL Final "   ]  GFR Estimate   Date Value Ref Range Status   04/08/2022 >90 >60 mL/min/1.73m2 Final     Comment:     Effective December 21, 2021 eGFRcr in adults is calculated using the 2021 CKD-EPI creatinine equation which includes age and gender (Scott espinoza al., NEJM, DOI: 10.1056/MMAVwe8538425)   08/17/2020 >90 >60 mL/min/[1.73_m2] Final     Comment:     Non  GFR Calc  Starting 12/18/2018, serum creatinine based estimated GFR (eGFR) will be   calculated using the Chronic Kidney Disease Epidemiology Collaboration   (CKD-EPI) equation.       GFR Estimate If Black   Date Value Ref Range Status   08/17/2020 >90 >60 mL/min/[1.73_m2] Final     Comment:      GFR Calc  Starting 12/18/2018, serum creatinine based estimated GFR (eGFR) will be   calculated using the Chronic Kidney Disease Epidemiology Collaboration   (CKD-EPI) equation.       Lab Results   Component Value Date    CR 0.63 04/08/2022    CR 0.80 08/17/2020     No results found for: MICROALBUMIN    Healthy Eating:  Healthy Eating Assessed Today: Yes    Being Active:  Being Active Assessed Today: No    Monitoring:  Monitoring Assessed Today: Yes    Taking Medications:  Diabetes Medication(s)     Biguanides       metFORMIN (GLUCOPHAGE-XR) 500 MG 24 hr tablet    Take 2 tablets (1,000 mg) by mouth 2 times daily (with meals)    Incretin Mimetic Agents (GLP-1 Receptor Agonists)       liraglutide (VICTOZA) 18 MG/3ML solution    Inject 1.2 mg Subcutaneous daily Inject 0.6mg daily x 7 days, then increase to 1.2mg daily if tolerating)          Taking Medication Assessed Today: Yes    Problem Solving:   yes     Reducing Risks:  Reducing Risks Assessed Today: No    Healthy Coping:  Healthy Coping Assessed Today: Yes  Emotional response to diabetes: Ready to learn  Stage of change: ACTION (Actively working towards change)  Patient Activation Measure Survey Score:  LANA Score (Last Two) 8/19/2014   LANA Raw Score 30   Activation Score 37.3   LANA Level 1      Diabetes knowledge and skills assessment:   Patient is knowledgeable in diabetes management concepts related to: Healthy Eating, Being Active, Monitoring, Taking Medication, Problem Solving, Reducing Risks and Healthy Coping  Continue education on the following diabetes management concepts: Healthy Eating, Monitoring, Taking Medication and Reducing Risks  Based on learning assessment above, most appropriate setting for further diabetes education would be: Individual setting.    INTERVENTIONS:  Education provided today on:  AADE Self-Care Behaviors:  Diabetes Pathophysiology  Healthy Eating: continue positive changes increasing vegetables and watching portions   Being Active: relationship to blood glucose  Monitoring: log and interpret results and individual blood glucose targets  Taking Medication: victoza instructions   Problem Solving: low blood glucose - causes, signs/symptoms, treatment and prevention and when to call health care provider    Opportunities for ongoing education and support in diabetes-self management were discussed.  Pt verbalized understanding of concepts discussed and recommendations provided today.       ASSESSMENT:  PCP reached out to writer and discussed beginning a GLP1 as a second line treatment due to hyperglycemia.  Set visit today to review victoza.  Patient denies any of the contraindications to a GLP1 medication including a history of Pancreatitis, history of Medullary Thyroid Cancer, or Multiple Endocrine Neoplasia Syndrome Type 2.   Instructions for taking Victoza were provided today. Pen injection technique was taught, patient verbalized understanding and was able to perform an accurate return demonstration. Side effects were discussed, if patient has any abdominal pain, with or without nausea and/or vomiting, stop Victoza and call provider, clinic or go to the emergency room.  Discussed if planning on becoming pregnant or pregnant this medication must be stopped immediately  and transitioned likley to insulin.  Pt verbalized understanding of concepts discussed and recommendations provided.  Additional instructions sent via [employer].  Blood sugars increasing; 200s fasting and having some 260-270s sometimes after meals.  Maddy is trying to balance meals, not skip eating and increase vegetables.     Patient's most recent   Lab Results   Component Value Date    A1C 7.3 04/08/2022    A1C 4.7 06/09/2013    is not meeting goal of <7.0    PLAN  Continue with positive diet & lifestyle changes   Mychart weekly with blood sugar results after beginning Florence Hampton RD, LD, [facility] [place]  Diabetes Education    Time Spent: 30 minutes  Encounter Type: Individual     A copy of this encounter was shared with the provider.

## 2022-04-20 NOTE — TELEPHONE ENCOUNTER
Zoltan Watson,     Please note if you approve of this plan or indicate an alternate plan.    Begin Victoza 0.6mg x 1-2 weeks, if tolerating increase per usual protocol 1.2 x 4 weeks, then 1.8mg if needed     Patient denies any of the contraindications to a GLP1 medication including a history of Pancreatitis, history of Medullary Thyroid Cancer, or Multiple Endocrine Neoplasia Syndrome Type 2.         Thanks!  Yaa Hampton RD, LD, [facility] [place]  Diabetes Education

## 2022-04-21 ENCOUNTER — TELEPHONE (OUTPATIENT)
Dept: FAMILY MEDICINE | Facility: CLINIC | Age: 37
End: 2022-04-21
Payer: MEDICARE

## 2022-04-21 NOTE — TELEPHONE ENCOUNTER
Patient's call transferred to author  Patient attempting to reach the [place] Care Team     Patient wondering if the Victoza prescription can be sent to [facility] in [place] prior to Thursday next week as her home care nurse will be doing her weekly visit that day    Patient states she would be more comfortable doing her first injection with her home care nurse present     Tim Godinez RN

## 2022-04-22 RX ORDER — LIRAGLUTIDE 6 MG/ML
1.2 INJECTION SUBCUTANEOUS DAILY
Qty: 6 ML | Refills: 3 | Status: SHIPPED | OUTPATIENT
Start: 2022-04-22 | End: 2022-05-23

## 2022-04-22 NOTE — TELEPHONE ENCOUNTER
Victoza and 4mm pen needles sent to [facility].  Patient updated via criss Hampton RD, LD, [facility]  Diabetes Education

## 2022-04-22 NOTE — TELEPHONE ENCOUNTER
PCP approved Victoza and will send to [facility] this date, 4/22.  Will update patient via [employer] as planned     Anna Hampton RD, LD, [facility]  Diabetes Education

## 2022-04-25 ENCOUNTER — HOSPITAL ENCOUNTER (EMERGENCY)
Facility: CLINIC | Age: 37
Discharge: HOME | End: 2022-04-25
Attending: EMERGENCY MEDICINE | Admitting: EMERGENCY MEDICINE
Payer: MEDICARE

## 2022-04-25 VITALS
OXYGEN SATURATION: 96 % | DIASTOLIC BLOOD PRESSURE: 89 MMHG | HEART RATE: 92 BPM | BODY MASS INDEX: 44.65 KG/M2 | SYSTOLIC BLOOD PRESSURE: 136 MMHG | WEIGHT: 252 LBS | TEMPERATURE: 98.4 F | RESPIRATION RATE: 16 BRPM

## 2022-04-25 DIAGNOSIS — R41.89 SEDATED DUE TO MULTIPLE MEDICATIONS: ICD-10-CM

## 2022-04-25 DIAGNOSIS — Z79.899 POLYPHARMACY: ICD-10-CM

## 2022-04-25 LAB
ALBUMIN SERPL-MCNC: 3.8 G/DL (ref 3.5–5)
ALP SERPL-CCNC: 92 U/L (ref 45–120)
ALT SERPL W P-5'-P-CCNC: 109 U/L (ref 0–45)
ANION GAP SERPL CALCULATED.3IONS-SCNC: 12 MMOL/L (ref 5–18)
AST SERPL W P-5'-P-CCNC: 147 U/L (ref 0–40)
BILIRUB SERPL-MCNC: 0.6 MG/DL (ref 0–1)
BUN SERPL-MCNC: 8 MG/DL (ref 8–22)
CALCIUM SERPL-MCNC: 9.9 MG/DL (ref 8.5–10.5)
CHLORIDE BLD-SCNC: 102 MMOL/L (ref 98–107)
CO2 SERPL-SCNC: 25 MMOL/L (ref 22–31)
CREAT SERPL-MCNC: 0.98 MG/DL (ref 0.6–1.1)
ERYTHROCYTE [DISTWIDTH] IN BLOOD BY AUTOMATED COUNT: 13 % (ref 10–15)
GFR SERPL CREATININE-BSD FRML MDRD: 76 ML/MIN/1.73M2
GLUCOSE BLD-MCNC: 215 MG/DL (ref 70–125)
HCT VFR BLD AUTO: 37.3 % (ref 35–47)
HGB BLD-MCNC: 12 G/DL (ref 11.7–15.7)
LITHIUM SERPL-SCNC: 0.7 MMOL/L
MCH RBC QN AUTO: 29.9 PG (ref 26.5–33)
MCHC RBC AUTO-ENTMCNC: 32.2 G/DL (ref 31.5–36.5)
MCV RBC AUTO: 93 FL (ref 78–100)
PLATELET # BLD AUTO: 185 10E3/UL (ref 150–450)
POTASSIUM BLD-SCNC: 3.9 MMOL/L (ref 3.5–5)
PROT SERPL-MCNC: 7.5 G/DL (ref 6–8)
RBC # BLD AUTO: 4.01 10E6/UL (ref 3.8–5.2)
SODIUM SERPL-SCNC: 139 MMOL/L (ref 136–145)
WBC # BLD AUTO: 10.7 10E3/UL (ref 4–11)

## 2022-04-25 PROCEDURE — 85027 COMPLETE CBC AUTOMATED: CPT | Performed by: EMERGENCY MEDICINE

## 2022-04-25 PROCEDURE — 99283 EMERGENCY DEPT VISIT LOW MDM: CPT

## 2022-04-25 PROCEDURE — 80053 COMPREHEN METABOLIC PANEL: CPT | Performed by: EMERGENCY MEDICINE

## 2022-04-25 PROCEDURE — 80178 ASSAY OF LITHIUM: CPT | Performed by: EMERGENCY MEDICINE

## 2022-04-25 PROCEDURE — 36415 COLL VENOUS BLD VENIPUNCTURE: CPT | Performed by: EMERGENCY MEDICINE

## 2022-04-25 RX ORDER — PHENOL 1.4 %
10 AEROSOL, SPRAY (ML) MUCOUS MEMBRANE
COMMUNITY
Start: 2022-03-29 | End: 2022-04-25

## 2022-04-25 RX ORDER — DOCUSATE SODIUM 50MG AND SENNOSIDES 8.6MG 8.6; 5 MG/1; MG/1
1 TABLET, FILM COATED ORAL AT BEDTIME
COMMUNITY
Start: 2022-04-20 | End: 2022-05-19

## 2022-04-25 NOTE — DISCHARGE INSTRUCTIONS
Your sedation issues likely related to recent changes in medications.  You need to discuss this with your primary care physician and have them review your medication list.  Potential changes may need to be made.

## 2022-04-25 NOTE — ED TRIAGE NOTES
Pt was at the [facility] for a chiropractor appointment.  Pt fell asleep in the lobby and staff felt like she was slow to respond and overly drowsy so 911 called.  Pt was hospitalized March 3-30th at [facility] for mental health concerns.  Pt states they increased her Risperdal and started her on Zyprexa.  Pt denies nausea, vomiting, diarrhea.  EMS blood sugar is 252.  Pt answering questions appropriately and pt walked to room from [facility].

## 2022-04-25 NOTE — ED PROVIDER NOTES
"EMERGENCY DEPARTMENT ENCOUNTER      NAME: Maddy Ortiz  AGE: 36 year old female  YOB: 1985  MRN: [id number]  EVALUATION DATE & TIME: No admission date for patient encounter.    PCP: Shirley Freeman    ED PROVIDER: Alexander Garcia M.D.      Chief Complaint   Patient presents with     Altered Mental Status         FINAL IMPRESSION:  Medication effect  Sedation      ED COURSE & MEDICAL DECISION MAKING:    Pertinent Labs & Imaging studies reviewed. (See chart for details)  36 year old female presents to the Emergency Department for evaluation of fatigue, feeling sedated.  Patient reports onset of symptoms after recent hospitalization during which time her Risperdal was increased and Zyprexa started.  States she just feels drowsy in the morning still around 10 AM.  Was being seen in clinic and referred to the ER because she seemed to be \"off\".  On exam patient is obese female conversant and nonfocal.  Symptoms likely related to change in medications as patient relates.  No signs of systemic illness such as fevers, chills, vomiting, diarrhea.  Out of caution given her multiple medications and past medical history will obtain blood work to assess for contributory electrolyte imbalance, anemia.  Lithium level also will be obtained.. Patient appears non toxic with stable vitals signs. Overall exam is benign.      8:51 AM I met with the patient for the initial interview and physical examination. Discussed plan for treatment and workup in the ED.    9:45 AM.  Laboratory evaluation remarkable for minimal hyperglycemia and mild elevation of her transaminases.  Patient with history of elevated transaminases.  Values today consistent with prior testing.  Electrolytes normal.  CBC normal.  Lithium level pending.  Patient without outward signs of Elin only minimal signs for sedation.  Laboratory evaluation essentially normal.  Recommend follow-up with primary care physician to discuss medications and ongoing issues " with sedation.  Lithium level will be reviewed later today.  At the conclusion of the encounter I discussed the results of all of the tests and the disposition. The questions were answered and return precautions provided. The patient or family acknowledged understanding and was agreeable with the care plan.       PPE: Provider wore gloves, N95 mask, eye protection, surgical cap.    MEDICATIONS GIVEN IN THE EMERGENCY:  Medications - No data to display    NEW PRESCRIPTIONS STARTED AT TODAY'S ER VISIT  New Prescriptions    No medications on file          =================================================================    HPI    Patient information was obtained from: patient     Use of Intrepreter: N/A         Maddy Ortiz is a 36 year old female with a pertient history of schizoaffective disorder bipolar type, depression, anxiety, DM type II, who presents to the ED via EMS for evaluation of drowsiness.     Patient presents from [facility] where she was overlying drowsy/napping while waiting in the lobby for her chiropractor appointment. Staff were concerned and called EMS. She has been feeling very drowsy since recent medication changes during mental health admission last month 3/3-3/30. Her Risperdal dose was increased and she started Zyprexa. The drowsiness occurs daily until ~10am. She has informed her psychiatrist of her symptoms. Thought drowsiness may be related to her diabetes. Sugars have been around 250. Planning to start Victoza later this week.  Denies nausea, vomiting, diarrhea.     REVIEW OF SYSTEMS   Constitutional:  Denies fever, chills  Respiratory:  Denies productive cough or increased work of breathing  Cardiovascular:  Denies chest pain, palpitations  GI:  Denies abdominal pain, nausea, vomiting, or change in bowel or bladder habits   Musculoskeletal:  Denies any new muscle/joint swelling  Skin:  Denies rash   Neurologic: Positive for drowsiness. Denies focal weakness  All systems negative  except as marked.     PAST MEDICAL HISTORY:  Past Medical History:   Diagnosis Date     Bipolar 1 disorder (H) 12/6/2012     Depressive disorder      Diabetes mellitus, type 2 (H) 12/13/2021     Paranoid type delusional disorder (H) 11/14/2012       PAST SURGICAL HISTORY:  Past Surgical History:   Procedure Laterality Date     TONSILLECTOMY & ADENOIDECTOMY      as a child     TONSILLECTOMY & ADENOIDECTOMY           CURRENT MEDICATIONS:    No current facility-administered medications for this encounter.    Current Outpatient Medications:      acetaminophen (TYLENOL) 325 MG tablet, Take 2 tablets (650 mg) by mouth every 4 hours as needed for mild pain (to moderate pain), Disp: , Rfl:      albuterol (PROAIR HFA/PROVENTIL HFA/VENTOLIN HFA) 108 (90 Base) MCG/ACT inhaler, Inhale 2 puffs into the lungs every 4 hours as needed for wheezing or shortness of breath / dyspnea, Disp: 18 g, Rfl: 1     ammonium lactate (LAC-HYDRIN) 12 % external cream, Apply topically 2 times daily as needed for dry skin, Disp: 385 g, Rfl: 3     blood glucose (NO BRAND SPECIFIED) test strip, Use to test blood sugar 6 times daily or as directed., Disp: 300 strip, Rfl: 11     cetirizine (ZYRTEC) 10 MG tablet, Take 1 tablet (10 mg) by mouth nightly as needed for allergies or rhinitis, Disp: 30 tablet, Rfl: 1     EPINEPHrine (ANY BX GENERIC EQUIV) 0.3 MG/0.3ML injection 2-pack, Inject 0.3 mg into the muscle as needed for anaphylaxis, Disp: , Rfl:      fluticasone (FLONASE) 50 MCG/ACT nasal spray, Spray 2 sprays into both nostrils daily, Disp: 15.8 mL, Rfl: 1     gabapentin (NEURONTIN) 300 MG capsule, Take 300 mg in  in afternoon and 600 mg at night., Disp: 120 capsule, Rfl: 3     hydrOXYzine (ATARAX) 25 MG tablet, Take 1 tablet (25 mg) by mouth every 4 hours as needed for anxiety, Disp: 60 tablet, Rfl: 1     insulin pen needle (32G X 4 MM) 32G X 4 MM miscellaneous, Use 1 pen needles daily with Victoza, Disp: 50 each, Rfl: 11     levothyroxine  (SYNTHROID/LEVOTHROID) 50 MCG tablet, Take 1 tablet (50 mcg) by mouth every morning (before breakfast), Disp: 30 tablet, Rfl: 1     liraglutide (VICTOZA) 18 MG/3ML solution, Inject 1.2 mg Subcutaneous daily Inject 0.6mg daily x 7 days, then increase to 1.2mg daily if tolerating), Disp: 6 mL, Rfl: 3     lithium ER (ESKALITH CR/LITHOBID) 450 MG CR tablet, Take 1 tablet (450 mg) by mouth every evening along with one 300 mg tablet for 750 mg total., Disp: 30 tablet, Rfl: 1     lithium ER (LITHOBID) 300 MG CR tablet, Take 2 tablets (600 mg) by mouth every morning and take 1 tablet every evening along with one 450 mg tablet for 750 mg total in the evening, Disp: 90 tablet, Rfl: 1     Melatonin 10 MG TABS tablet, Take 1 tablet (10 mg) by mouth nightly as needed for sleep, Disp: 30 tablet, Rfl: 1     metFORMIN (GLUCOPHAGE-XR) 500 MG 24 hr tablet, Take 2 tablets (1,000 mg) by mouth 2 times daily (with meals), Disp: 120 tablet, Rfl: 1     mineral oil-hydrophilic petrolatum (AQUAPHOR) external ointment, Apply topically daily as needed for dry skin (apply to dry skin and rash around umbilical hernia), Disp: , Rfl:      OLANZapine (ZYPREXA) 20 MG tablet, Take 1 tablet (20 mg) by mouth every evening, Disp: 30 tablet, Rfl: 1     risperiDONE microspheres ER (RISPERDAL CONSTA) 37.5 MG injection, Inject 2 mLs (37.5 mg) into the muscle every 14 days .  Due 4/7/2022., Disp: 1 each, Rfl: 1     SENEXON-S 8.6-50 MG tablet, Take 1 tablet by mouth At Bedtime, Disp: , Rfl:      terbinafine (LAMISIL AT) 1 % external cream, Apply topically 2 times daily as needed (rash/itching.  Apply to feet.), Disp: 30 g, Rfl: 1    ALLERGIES:  Allergies   Allergen Reactions     Dust Mites Shortness Of Breath     Animal Dander      Other reaction(s): *Unknown     Metformin Fatigue     Patient is taking extended release at home as of 3/3/22     Mold      Other reaction(s): Runny Nose     Trees        FAMILY HISTORY:  Family History   Adopted: Yes   Problem  Relation Age of Onset     Unknown/Adopted Mother      Unknown/Adopted Father      Unknown/Adopted Other      Hypertension Sister        SOCIAL HISTORY:   Social History     Socioeconomic History     Marital status: Single   Occupational History     Employer: CURRENTLY UNEMPLOYED AT THIS TIME   Tobacco Use     Smoking status: Former Smoker     Packs/day: 0.50     Types: Cigarettes     Smokeless tobacco: Former User     Tobacco comment: around 2nd hand smoke   Vaping Use     Vaping Use: Never used   Substance and Sexual Activity     Alcohol use: Not Currently     Drug use: Not Currently     Sexual activity: Not Currently     Partners: Male     Birth control/protection: Abstinence, None   Other Topics Concern     Parent/sibling w/ CABG, MI or angioplasty before 65F 55M? No   Social History Narrative    One child    Education: some college        October 21, 2021    ENVIRONMENTAL HISTORY: [place] The home is heated with a electric furnace. They do not have central air conditioning, does have box air conditioner in the wall and has air purifier. The patient's bedroom is furnished with stuffed animals in bed, carpeting in bedroom and fabric window coverings. Pets inside the apartment includes 1 cat. There is history of cockroach or mice infestation. There are no smokers in the house.  The apartment does not have a basement.        VITALS:  Patient Vitals for the past 24 hrs:   BP Temp Temp src Pulse Resp SpO2 Weight   04/25/22 0855 (!) 136/92 98.4  F (36.9  C) Oral 92 18 97 % 114.3 kg (252 lb)        PHYSICAL EXAM    Constitutional:  Awake, alert, in no apparent distress  HENT:  Normocephalic, Atraumatic. Bilateral external ears normal. Oropharynx moist. Nose normal. Neck- Normal range of motion with no guarding, No midline cervical tenderness, Supple, No stridor.   Eyes:  PERRL, EOMI with no signs of entrapment, Conjunctiva normal, No discharge.   Respiratory:  Normal  breath sounds, No respiratory distress, No wheezing.    Cardiovascular:  Normal heart rate, Normal rhythm, No appreciable rubs or gallops.   GI:  Soft, No tenderness, No distension, No palpable masses  Musculoskeletal:  Intact distal pulses, No edema. Good range of motion in all major joints. No tenderness to palpation or major deformities noted.  Integument:  Warm, Dry, No erythema, No rash.   Neurologic:  Alert & oriented, Normal motor function, Normal sensory function, No focal deficits noted.   Psychiatric:  Affect normal, Judgment normal, Mood normal.     LAB:  All pertinent labs reviewed and interpreted.  Results for orders placed or performed during the hospital encounter of 04/25/22   Comprehensive metabolic panel     Status: Abnormal   Result Value Ref Range    Sodium 139 136 - 145 mmol/L    Potassium 3.9 3.5 - 5.0 mmol/L    Chloride 102 98 - 107 mmol/L    Carbon Dioxide (CO2) 25 22 - 31 mmol/L    Anion Gap 12 5 - 18 mmol/L    Urea Nitrogen 8 8 - 22 mg/dL    Creatinine 0.98 0.60 - 1.10 mg/dL    Calcium 9.9 8.5 - 10.5 mg/dL    Glucose 215 (H) 70 - 125 mg/dL    Alkaline Phosphatase 92 45 - 120 U/L     (H) 0 - 40 U/L     (H) 0 - 45 U/L    Protein Total 7.5 6.0 - 8.0 g/dL    Albumin 3.8 3.5 - 5.0 g/dL    Bilirubin Total 0.6 0.0 - 1.0 mg/dL    GFR Estimate 76 >60 mL/min/1.73m2   CBC (+ platelets, no diff)     Status: Normal   Result Value Ref Range    WBC Count 10.7 4.0 - 11.0 10e3/uL    RBC Count 4.01 3.80 - 5.20 10e6/uL    Hemoglobin 12.0 11.7 - 15.7 g/dL    Hematocrit 37.3 35.0 - 47.0 %    MCV 93 78 - 100 fL    MCH 29.9 26.5 - 33.0 pg    MCHC 32.2 31.5 - 36.5 g/dL    RDW 13.0 10.0 - 15.0 %    Platelet Count 185 150 - 450 10e3/uL       PROCEDURES:   None       JAYSHREE, Padmini Simon, am serving as a scribe to document services personally performed by Alexander Garcia MD, based on my observation and the provider's statements to me. I, Alexander Garcia MD attest that Padmini Simon is acting in a scribe  capacity, has observed my performance of the services and has documented them in accordance with my direction.    Alexander Garcia M.D.  Emergency Medicine  [facility] EMERGENCY ROOM     Alexander Garcia MD  04/25/22 0931

## 2022-04-27 ENCOUNTER — OFFICE VISIT (OUTPATIENT)
Dept: OBGYN | Facility: CLINIC | Age: 37
End: 2022-04-27
Payer: MEDICARE

## 2022-04-27 VITALS
DIASTOLIC BLOOD PRESSURE: 69 MMHG | RESPIRATION RATE: 16 BRPM | HEART RATE: 97 BPM | SYSTOLIC BLOOD PRESSURE: 127 MMHG | HEIGHT: 63 IN | WEIGHT: 256.2 LBS | TEMPERATURE: 99.4 F | BODY MASS INDEX: 45.39 KG/M2

## 2022-04-27 DIAGNOSIS — Z30.430 ENCOUNTER FOR INSERTION OF INTRAUTERINE CONTRACEPTIVE DEVICE: ICD-10-CM

## 2022-04-27 DIAGNOSIS — Z11.3 ROUTINE SCREENING FOR STI (SEXUALLY TRANSMITTED INFECTION): Primary | ICD-10-CM

## 2022-04-27 DIAGNOSIS — Z30.430 ENCOUNTER FOR IUD INSERTION: ICD-10-CM

## 2022-04-27 LAB
HCG UR QL: NEGATIVE
INTERNAL QC OK POCT: NORMAL
POCT KIT EXPIRATION DATE: NORMAL
POCT KIT LOT NUMBER: NORMAL

## 2022-04-27 PROCEDURE — 81025 URINE PREGNANCY TEST: CPT | Performed by: OBSTETRICS & GYNECOLOGY

## 2022-04-27 PROCEDURE — 87591 N.GONORRHOEAE DNA AMP PROB: CPT | Performed by: OBSTETRICS & GYNECOLOGY

## 2022-04-27 PROCEDURE — 86780 TREPONEMA PALLIDUM: CPT | Performed by: OBSTETRICS & GYNECOLOGY

## 2022-04-27 PROCEDURE — 58300 INSERT INTRAUTERINE DEVICE: CPT | Performed by: OBSTETRICS & GYNECOLOGY

## 2022-04-27 PROCEDURE — 87491 CHLMYD TRACH DNA AMP PROBE: CPT | Performed by: OBSTETRICS & GYNECOLOGY

## 2022-04-27 PROCEDURE — 99214 OFFICE O/P EST MOD 30 MIN: CPT | Mod: 25 | Performed by: OBSTETRICS & GYNECOLOGY

## 2022-04-27 PROCEDURE — 87389 HIV-1 AG W/HIV-1&-2 AB AG IA: CPT | Performed by: OBSTETRICS & GYNECOLOGY

## 2022-04-27 PROCEDURE — 36415 COLL VENOUS BLD VENIPUNCTURE: CPT | Performed by: OBSTETRICS & GYNECOLOGY

## 2022-04-27 PROCEDURE — 87340 HEPATITIS B SURFACE AG IA: CPT | Performed by: OBSTETRICS & GYNECOLOGY

## 2022-04-27 PROCEDURE — 86803 HEPATITIS C AB TEST: CPT | Performed by: OBSTETRICS & GYNECOLOGY

## 2022-04-27 NOTE — PROGRESS NOTES
[facility]  OB/GYN Clinic   Gynecology Consult Note    CC:    Chief Complaint   Patient presents with     Gyn Exam     Std screen. Possible exposure       HPI: Ms. Ortiz is a 36 year old  being seen for GYN consultation for STI testing and contraception. Has used OCPs and the Mirena IUD. Tolerated the Mirena IUD well, but didn't;t like that she was amenorrheic on it and was always worried about pregnancy or when she would go into menopause. Has unprotected sec 3 months ago, and needs contraception.       ROS: A 10 pt ROS was completed and found to be otherwise negative unless mentioned in the HPI.     PMH:   Past Medical History:   Diagnosis Date     Bipolar 1 disorder (H) 2012     Depressive disorder      Diabetes mellitus, type 2 (H) 2021     Paranoid type delusional disorder (H) 2012       PSHx:   Past Surgical History:   Procedure Laterality Date     TONSILLECTOMY & ADENOIDECTOMY      as a child     TONSILLECTOMY & ADENOIDECTOMY         OBHx:   OB History    Para Term  AB Living   1 1 1 0 0 1   SAB IAB Ectopic Multiple Live Births   0 0 0 0 1      # Outcome Date GA Lbr Michael/2nd Weight Sex Delivery Anes PTL Lv   1 Term     F    NHAN      Name: Leonora       Medications:   acetaminophen (TYLENOL) 325 MG tablet, Take 2 tablets (650 mg) by mouth every 4 hours as needed for mild pain (to moderate pain)  albuterol (PROAIR HFA/PROVENTIL HFA/VENTOLIN HFA) 108 (90 Base) MCG/ACT inhaler, Inhale 2 puffs into the lungs every 4 hours as needed for wheezing or shortness of breath / dyspnea  ammonium lactate (LAC-HYDRIN) 12 % external cream, Apply topically 2 times daily as needed for dry skin  blood glucose (NO BRAND SPECIFIED) test strip, Use to test blood sugar 6 times daily or as directed.  cetirizine (ZYRTEC) 10 MG tablet, Take 1 tablet (10 mg) by mouth nightly as needed for allergies or rhinitis  EPINEPHrine (ANY BX GENERIC EQUIV) 0.3 MG/0.3ML injection 2-pack,  Inject 0.3 mg into the muscle as needed for anaphylaxis  fluticasone (FLONASE) 50 MCG/ACT nasal spray, Spray 2 sprays into both nostrils daily  gabapentin (NEURONTIN) 300 MG capsule, Take 300 mg in  in afternoon and 600 mg at night.  hydrOXYzine (ATARAX) 25 MG tablet, Take 1 tablet (25 mg) by mouth every 4 hours as needed for anxiety  levothyroxine (SYNTHROID/LEVOTHROID) 50 MCG tablet, Take 1 tablet (50 mcg) by mouth every morning (before breakfast)  liraglutide (VICTOZA) 18 MG/3ML solution, Inject 1.2 mg Subcutaneous daily Inject 0.6mg daily x 7 days, then increase to 1.2mg daily if tolerating)  lithium ER (ESKALITH CR/LITHOBID) 450 MG CR tablet, Take 1 tablet (450 mg) by mouth every evening along with one 300 mg tablet for 750 mg total.  lithium ER (LITHOBID) 300 MG CR tablet, Take 2 tablets (600 mg) by mouth every morning and take 1 tablet every evening along with one 450 mg tablet for 750 mg total in the evening  Melatonin 10 MG TABS tablet, Take 1 tablet (10 mg) by mouth nightly as needed for sleep  metFORMIN (GLUCOPHAGE-XR) 500 MG 24 hr tablet, Take 2 tablets (1,000 mg) by mouth 2 times daily (with meals)  mineral oil-hydrophilic petrolatum (AQUAPHOR) external ointment, Apply topically daily as needed for dry skin (apply to dry skin and rash around umbilical hernia)  risperiDONE microspheres ER (RISPERDAL CONSTA) 37.5 MG injection, Inject 2 mLs (37.5 mg) into the muscle every 14 days .  Due 4/7/2022.  SENEXON-S 8.6-50 MG tablet, Take 1 tablet by mouth At Bedtime  terbinafine (LAMISIL AT) 1 % external cream, Apply topically 2 times daily as needed (rash/itching.  Apply to feet.)  insulin pen needle (32G X 4 MM) 32G X 4 MM miscellaneous, Use 1 pen needles daily with Victoza  OLANZapine (ZYPREXA) 20 MG tablet, Take 1 tablet (20 mg) by mouth every evening    No current facility-administered medications on file prior to visit.      Allergies:      Allergies   Allergen Reactions     Dust Mites Shortness Of  Breath     Animal Dander      Other reaction(s): *Unknown     Metformin Fatigue     Patient is taking extended release at home as of 3/3/22     Mold      Other reaction(s): Runny Nose     Trees        Social History:   Social History     Socioeconomic History     Marital status: Single     Spouse name: Not on file     Number of children: Not on file     Years of education: Not on file     Highest education level: Not on file   Occupational History     Employer: CURRENTLY UNEMPLOYED AT THIS TIME   Tobacco Use     Smoking status: Former Smoker     Packs/day: 0.50     Types: Cigarettes     Smokeless tobacco: Former User     Tobacco comment: around 2nd hand smoke   Vaping Use     Vaping Use: Never used   Substance and Sexual Activity     Alcohol use: Not Currently     Drug use: Not Currently     Sexual activity: Not Currently     Partners: Male     Birth control/protection: Abstinence, None   Other Topics Concern     Parent/sibling w/ CABG, MI or angioplasty before 65F 55M? No   Social History Narrative    One child    Education: some college        October 21, 2021    ENVIRONMENTAL HISTORY: [place] The home is heated with a electric furnace. They do not have central air conditioning, does have box air conditioner in the wall and has air purifier. The patient's bedroom is furnished with stuffed animals in bed, carpeting in bedroom and fabric window coverings. Pets inside the apartment includes 1 cat. There is history of cockroach or mice infestation. There are no smokers in the house.  The apartment does not have a basement.      Social Determinants of Health     Financial Resource Strain: Not on file   Food Insecurity: Not on file   Transportation Needs: Not on file   Physical Activity: Not on file   Stress: Not on file   Social Connections: Not on file   Intimate Partner Violence: Not on file   Housing Stability: Not on file     Social History     Socioeconomic  "History     Marital status: Single     Spouse name: None     Number of children: None     Years of education: None     Highest education level: None   Occupational History     Employer: CURRENTLY UNEMPLOYED AT THIS TIME   Tobacco Use     Smoking status: Former Smoker     Packs/day: 0.50     Types: Cigarettes     Smokeless tobacco: Former User     Tobacco comment: around 2nd hand smoke   Vaping Use     Vaping Use: Never used   Substance and Sexual Activity     Alcohol use: Not Currently     Drug use: Not Currently     Sexual activity: Not Currently     Partners: Male     Birth control/protection: Abstinence, None   Other Topics Concern     Parent/sibling w/ CABG, MI or angioplasty before 65F 55M? No   Social History Narrative    One child    Education: some college        October 21, 2021    ENVIRONMENTAL HISTORY: The [place] The home is heated with a electric furnace. They do not have central air conditioning, does have box air conditioner in the wall and has air purifier. The patient's bedroom is furnished with stuffed animals in bed, carpeting in bedroom and fabric window coverings. Pets inside the apartment includes 1 cat. There is history of cockroach or mice infestation. There are no smokers in the house.  The apartment does not have a basement.        Family History:   Family History   Adopted: Yes   Problem Relation Age of Onset     Unknown/Adopted Mother      Unknown/Adopted Father      Unknown/Adopted Other      Hypertension Sister        Physical Exam:   Vitals:    04/27/22 1314   BP: 127/69   BP Location: Right arm   Patient Position: Chair   Cuff Size: Adult Large   Pulse: 97   Resp: 16   Temp: 99.4  F (37.4  C)   TempSrc: Tympanic   Weight: 116.2 kg (256 lb 3.2 oz)   Height: 1.6 m (5' 3\")      Estimated body mass index is 45.38 kg/m  as calculated from the following:    Height as of this encounter: 1.6 m (5' 3\").    Weight as of this encounter: 116.2 kg " (256 lb 3.2 oz).    Gen: Pleasant, talkative female in no apparent distress   Respiratory: breathing comfortably on room air   Cardiac: Regular rate, warm and well-perfused.   GI: Abd soft and non-tender, large pannus limits exam   : External genitalia is free of lesion. Urethra and bartholin glands normal.  Vaginal mucosa is moist and pink without unusual discharge. Bimanual exam reveals mobile anteverted uterus without cervical motion tenderness.  MSK: Grossly normal movement of all four extremities  Psych: mood and affect bright     Labs: UPT negative     A&P: 36 year old yo  who presents for STI testing and contraception counseling. Discussed highly effective methods first, namely LARCs, including the IUD (Mirena, Kyleena and Paraguard) and the Nexplanon. Discussed placement, expected bleeding profiles, side effect profile, efficacy (as high as sterilization) and reversibility. Discussed the remaining options of reduced efficacy including depo provera, OCPs, hormonal patch and hormonal ring including side effect profile and bleeding patterns. Plan to avoid OCPs given likely interaction with other medications. Wants IUD, but wants to still have her period. Recommended Cathy IUD given lowest dose of progesterone.   GC/Chlam colleted, blood draw for HIV, RPR, Hep B, Hep C.   IUD Placement Procedure Note    Liset Angel  1985  [id number]    The patient was counseled on the risks (including including risk of infection, uterine perforation, cramping), benefits (high efficacy, low maintenance birth control), and alternatives of the procedure. Verbal and written consent were obtained.  A UPT was negative prior to the procedure.    Technique: The patient was placed in the dorsal lithotomy position.  A speculum was placed in the vagina and the cervix was cleaned with betadine swabsx3. The anterior cervical lip was grasped with a tenaculum. The Cathy IUD was loaded, advanced to the fundus, pulled back 1cm,  deployed and advanced to the fundus. Using the insertor as a sound, the uterus sounded to 9 cm. IUD strings were cut 3cm from the external cervical os. The patient tolerated the procedure well and there were no complications. EBL: 5cc. Discussed 3 years of efficacy, card given. Recommended abstinence or condoms for one week. Condoms always to prevent STIs.     Maddison Bear MD  OB/GYN  4/27/2022

## 2022-04-28 LAB
C TRACH DNA SPEC QL PROBE+SIG AMP: NEGATIVE
HBV SURFACE AG SERPL QL IA: NONREACTIVE
HCV AB SERPL QL IA: NONREACTIVE
HIV 1+2 AB+HIV1 P24 AG SERPL QL IA: NONREACTIVE
N GONORRHOEA DNA SPEC QL NAA+PROBE: NEGATIVE
T PALLIDUM AB SER QL: NONREACTIVE

## 2022-05-06 ENCOUNTER — TELEPHONE (OUTPATIENT)
Dept: ALLERGY | Facility: CLINIC | Age: 37
End: 2022-05-06

## 2022-05-06 NOTE — TELEPHONE ENCOUNTER
Reason for Call:  Other appointment    Detailed comments: patient was feeling depressed so she missed her appt today. She would like a call to discuss some medication changes. She states her diabetes treatment is helping her breathing.       Phone Number Patient can be reached at: Home number on file [phone] (home)    Best Time: any    Can we leave a detailed message on this number? NO    Call taken on 5/6/2022 at 11:22 AM by Lisset Lindsay

## 2022-05-06 NOTE — TELEPHONE ENCOUNTER
Called and spoke with pt. Pt wanted to reschedule. Assisted in scheduling in [place] as next available. She is aware that is in ER not [place]. No further questions.     Callie VELASQUEZ RN  Specialty/Allergy Clinics

## 2022-05-11 ENCOUNTER — OFFICE VISIT (OUTPATIENT)
Dept: ALLERGY | Facility: OTHER | Age: 37
End: 2022-05-11
Payer: MEDICARE

## 2022-05-11 ENCOUNTER — TELEPHONE (OUTPATIENT)
Dept: FAMILY MEDICINE | Facility: CLINIC | Age: 37
End: 2022-05-11
Payer: MEDICARE

## 2022-05-11 VITALS
SYSTOLIC BLOOD PRESSURE: 118 MMHG | BODY MASS INDEX: 43.91 KG/M2 | DIASTOLIC BLOOD PRESSURE: 74 MMHG | HEART RATE: 103 BPM | HEIGHT: 63 IN | WEIGHT: 247.8 LBS | OXYGEN SATURATION: 96 %

## 2022-05-11 DIAGNOSIS — R06.02 SHORTNESS OF BREATH: ICD-10-CM

## 2022-05-11 DIAGNOSIS — J30.1 SEASONAL ALLERGIC RHINITIS DUE TO POLLEN: Primary | ICD-10-CM

## 2022-05-11 DIAGNOSIS — H10.13 ALLERGIC CONJUNCTIVITIS OF BOTH EYES: ICD-10-CM

## 2022-05-11 DIAGNOSIS — J30.89 ALLERGIC RHINITIS DUE TO DUST MITE: ICD-10-CM

## 2022-05-11 DIAGNOSIS — J30.89 ALLERGIC RHINITIS CAUSED BY MOLD: ICD-10-CM

## 2022-05-11 DIAGNOSIS — J30.81 ALLERGIC RHINITIS DUE TO ANIMALS: ICD-10-CM

## 2022-05-11 PROCEDURE — 99214 OFFICE O/P EST MOD 30 MIN: CPT | Performed by: ALLERGY & IMMUNOLOGY

## 2022-05-11 RX ORDER — BUDESONIDE AND FORMOTEROL FUMARATE DIHYDRATE 80; 4.5 UG/1; UG/1
2 AEROSOL RESPIRATORY (INHALATION) 2 TIMES DAILY
Qty: 10.2 G | Refills: 3 | Status: SHIPPED | OUTPATIENT
Start: 2022-05-11 | End: 2022-07-22

## 2022-05-11 RX ORDER — CETIRIZINE HYDROCHLORIDE 10 MG/1
10-20 TABLET ORAL
Qty: 30 TABLET | Refills: 3 | Status: SHIPPED | OUTPATIENT
Start: 2022-05-11 | End: 2022-05-24

## 2022-05-11 ASSESSMENT — ENCOUNTER SYMPTOMS
WHEEZING: 0
NAUSEA: 0
ACTIVITY CHANGE: 0
RHINORRHEA: 0
EYE DISCHARGE: 0
ABDOMINAL PAIN: 0
EYE ITCHING: 0
FATIGUE: 1
DIARRHEA: 1
MYALGIAS: 0
SHORTNESS OF BREATH: 1
CHEST TIGHTNESS: 0
VOMITING: 0
EYE REDNESS: 0
SINUS PRESSURE: 0
ARTHRALGIAS: 0
HEADACHES: 0
FACIAL SWELLING: 0
FEVER: 0
CHILLS: 0
COUGH: 0
ADENOPATHY: 0

## 2022-05-11 NOTE — TELEPHONE ENCOUNTER
Pt called back.  Shared below with pt.  She checked her supply and found that she does have her second pen.    Elvira Quinn RN

## 2022-05-11 NOTE — LETTER
5/11/2022         RE: Maddy Ortiz  [address]  [facility] [address]        Dear Colleague,    Thank you for referring your patient, Maddy Ortiz, to the [facility]. Please see a copy of my visit note below.    SUBJECTIVE:                                                                   Maddy Ortiz presents today to our Allergy Clinic at [facility] for a follow up visit. She is a 36 year old female with allergic rhinitis.  Percutaneous skin puncture testing for aeroallergens performed in November 2016 showed sensitivity to dog, cat, dust mite, molds, pollen of trees, grass, and weeds. In spring-summer 2018, she had a buildup using cluster schedule for allergen immunotherapy.  She reached maintenance dose in July 2018.  One episode of anaphylaxis on September 8, 2020, due to allergen immunotherapy, Red 1:1, 0.5mL  Was given epi x 2.  She tolerated a lower dose x 1 after that. She stopped taking hydroxyzine.  Take cetirizine 10 mg by mouth once daily.  Sometimes, when symptoms are worse, she may take 20 mg by mouth  She stopped Atarax.     She stopped taking hydroxyzine. Take cetirizine 10 mg by mouth once daily. Sometimes, when symptoms are worse, she may take 20 mg by mouth, which makes her feel better.  She does not use any nasal sprays.    Feels that on this current regimen, rhinitis symptoms are well controlled. Does the reason why she does not want to use any nasal sprays. She even has a new cat and does not have a lot of rhinoconjunctivitis symptoms around it.    She has a history of dyspnea on exertion. She was asked several times if albuterol inhaler was helpful or not, and she was not sure. These days, she feels that albuterol is helpful when she uses it. There are days when she feels that she needs it once or twice a day, but in real life, she will use it just once a week. Does not carry it with her all the time.  In November, she  had PFT, which showed a mild obstructive/restrictive pattern. There was a questionable postbronchodilator partial reversibility.             Patient Active Problem List   Diagnosis     Animal dander allergy     CARDIOVASCULAR SCREENING; LDL GOAL LESS THAN 160     Psychosis (H)     Paranoid type delusional disorder (H)     Bipolar 1 disorder (H)     Insomnia     Depression with anxiety     Seasonal allergic rhinitis due to pollen     Allergic rhinitis due to mold     Allergic rhinitis due to animal dander     Allergic rhinitis due to dust mite     Encounter for IUD insertion     Schizoaffective disorder, bipolar type (H)     Gastroesophageal reflux disease without esophagitis     Paranoia (psychosis) (H)     Morbid obesity (H)     Schizoaffective disorder (H)     Umbilical hernia without obstruction and without gangrene     Fatty liver     Diabetes mellitus, type 2 (H)     Elevated LFTs     Disorganized behavior     Infection due to 2019 novel coronavirus     Polypharmacy     Sedated due to multiple medications       Past Medical History:   Diagnosis Date     Bipolar 1 disorder (H) 12/6/2012     Depressive disorder      Diabetes mellitus, type 2 (H) 12/13/2021     Paranoid type delusional disorder (H) 11/14/2012      *Patient is Adopted       Problem (# of Occurrences) Relation (Name,Age of Onset)    Hypertension (1) Sister    Unknown/Adopted (3) Mother, Father, Other        Past Surgical History:   Procedure Laterality Date     TONSILLECTOMY & ADENOIDECTOMY      as a child     TONSILLECTOMY & ADENOIDECTOMY       Social History     Socioeconomic History     Marital status: Single     Spouse name: None     Number of children: None     Years of education: None     Highest education level: None   Occupational History     Employer: CURRENTLY UNEMPLOYED AT THIS TIME   Tobacco Use     Smoking status: Former Smoker     Packs/day: 0.50     Types: Cigarettes     Smokeless tobacco: Former User     Tobacco comment: around 2nd  hand smoke   Vaping Use     Vaping Use: Never used   Substance and Sexual Activity     Alcohol use: Not Currently     Drug use: Not Currently     Sexual activity: Not Currently     Partners: Male     Birth control/protection: Abstinence, None   Other Topics Concern     Parent/sibling w/ CABG, MI or angioplasty before 65F 55M? No   Social History Narrative    One child    Education: some college        October 21, 2021    ENVIRONMENTAL HISTORY: [place] The home is heated with a electric furnace. They do not have central air conditioning, does have box air conditioner in the wall and has air purifier. The patient's bedroom is furnished with stuffed animals in bed, carpeting in bedroom and fabric window coverings. Pets inside the apartment includes 1 cat. There is history of cockroach or mice infestation. There are no smokers in the house.  The apartment does not have a basement.            Review of Systems   Constitutional: Positive for fatigue. Negative for activity change, chills and fever.        Low energy   HENT: Negative for congestion, ear discharge, facial swelling, nosebleeds, postnasal drip, rhinorrhea, sinus pressure and sneezing.    Eyes: Negative for discharge, redness and itching.   Respiratory: Positive for shortness of breath. Negative for cough, chest tightness and wheezing.    Cardiovascular: Negative for chest pain.   Gastrointestinal: Positive for diarrhea. Negative for abdominal pain, nausea and vomiting.   Musculoskeletal: Negative for arthralgias and myalgias.   Skin: Negative for rash.   Allergic/Immunologic: Positive for environmental allergies.   Neurological: Negative for headaches.   Hematological: Negative for adenopathy.   Psychiatric/Behavioral: Positive for behavioral problems. Negative for self-injury.           Current Outpatient Medications:      budesonide-formoterol (SYMBICORT) 80-4.5 MCG/ACT Inhaler, Inhale 2 puffs into the  lungs 2 times daily, Disp: 10.2 g, Rfl: 3     cetirizine (ZYRTEC) 10 MG tablet, Take 1-2 tablets (10-20 mg) by mouth nightly as needed for allergies or rhinitis, Disp: 30 tablet, Rfl: 3     gabapentin (NEURONTIN) 300 MG capsule, Take 300 mg in  in afternoon and 600 mg at night., Disp: 120 capsule, Rfl: 3     lithium ER (ESKALITH CR/LITHOBID) 450 MG CR tablet, Take 1 tablet (450 mg) by mouth every evening along with one 300 mg tablet for 750 mg total., Disp: 30 tablet, Rfl: 1     Melatonin 10 MG TABS tablet, Take 1 tablet (10 mg) by mouth nightly as needed for sleep, Disp: 30 tablet, Rfl: 1     metFORMIN (GLUCOPHAGE-XR) 500 MG 24 hr tablet, Take 2 tablets (1,000 mg) by mouth 2 times daily (with meals), Disp: 120 tablet, Rfl: 1     acetaminophen (TYLENOL) 325 MG tablet, Take 2 tablets (650 mg) by mouth every 4 hours as needed for mild pain (to moderate pain), Disp: , Rfl:      albuterol (PROAIR HFA/PROVENTIL HFA/VENTOLIN HFA) 108 (90 Base) MCG/ACT inhaler, Inhale 2 puffs into the lungs every 4 hours as needed for wheezing or shortness of breath / dyspnea, Disp: 18 g, Rfl: 1     ammonium lactate (LAC-HYDRIN) 12 % external cream, Apply topically 2 times daily as needed for dry skin, Disp: 385 g, Rfl: 3     blood glucose (NO BRAND SPECIFIED) test strip, Use to test blood sugar 6 times daily or as directed., Disp: 300 strip, Rfl: 11     EPINEPHrine (ANY BX GENERIC EQUIV) 0.3 MG/0.3ML injection 2-pack, Inject 0.3 mg into the muscle as needed for anaphylaxis, Disp: , Rfl:      fluticasone (FLONASE) 50 MCG/ACT nasal spray, Spray 2 sprays into both nostrils daily, Disp: 15.8 mL, Rfl: 1     hydrOXYzine (ATARAX) 25 MG tablet, Take 1 tablet (25 mg) by mouth every 4 hours as needed for anxiety, Disp: 60 tablet, Rfl: 1     insulin pen needle (32G X 4 MM) 32G X 4 MM miscellaneous, Use 1 pen needles daily with Victoza, Disp: 50 each, Rfl: 11     levonorgestrel (ROMI) 13.5 MG IUD, 1 each (13.5 mg) by Intrauterine route once,  Disp: , Rfl:      levothyroxine (SYNTHROID/LEVOTHROID) 50 MCG tablet, Take 1 tablet (50 mcg) by mouth every morning (before breakfast), Disp: 30 tablet, Rfl: 1     liraglutide (VICTOZA) 18 MG/3ML solution, Inject 1.2 mg Subcutaneous daily Inject 0.6mg daily x 7 days, then increase to 1.2mg daily if tolerating), Disp: 6 mL, Rfl: 3     lithium ER (LITHOBID) 300 MG CR tablet, Take 2 tablets (600 mg) by mouth every morning and take 1 tablet every evening along with one 450 mg tablet for 750 mg total in the evening, Disp: 90 tablet, Rfl: 1     mineral oil-hydrophilic petrolatum (AQUAPHOR) external ointment, Apply topically daily as needed for dry skin (apply to dry skin and rash around umbilical hernia), Disp: , Rfl:      OLANZapine (ZYPREXA) 20 MG tablet, Take 1 tablet (20 mg) by mouth every evening, Disp: 30 tablet, Rfl: 1     risperiDONE microspheres ER (RISPERDAL CONSTA) 37.5 MG injection, Inject 2 mLs (37.5 mg) into the muscle every 14 days .  Due 4/7/2022., Disp: 1 each, Rfl: 1     SENEXON-S 8.6-50 MG tablet, Take 1 tablet by mouth At Bedtime, Disp: , Rfl:      terbinafine (LAMISIL AT) 1 % external cream, Apply topically 2 times daily as needed (rash/itching.  Apply to feet.), Disp: 30 g, Rfl: 1  Immunization History   Administered Date(s) Administered     COVID-19,FARA,Pfizer (12+ Yrs) 05/18/2021, 06/08/2021, 01/19/2022     DTAP (<7y) 03/01/1985, 06/01/1985, 07/01/1985, 07/01/1986, 07/26/1990     HPV Quadrivalent 12/12/2008, 09/06/2011     Hep B, Peds or Adolescent 02/16/1998     Historical DTP/aP 03/01/1985, 06/01/1985, 07/01/1985, 07/01/1986, 07/26/1990     Historical Hepb 02/16/1998     Influenza (IIV3) PF 12/09/2008, 12/06/2012     Influenza Vaccine IM > 6 months Valent IIV4 (Alfuria,Fluzone) 11/21/2017, 01/03/2019, 09/17/2019, 09/16/2020     MMR 02/27/1986, 07/17/1997     Poliovirus, inactivated (IPV) 06/01/1985, 07/01/1985, 01/01/1987, 07/26/1990     TDAP Vaccine (Adacel) 06/30/1997, 03/31/2008, 05/14/2010,  "12/06/2012     Td (Adult), Adsorbed 06/30/1997     Allergies   Allergen Reactions     Dust Mites Shortness Of Breath     Animal Dander      Other reaction(s): *Unknown     Metformin Fatigue     Patient is taking extended release at home as of 3/3/22     Mold      Other reaction(s): Runny Nose     Trees      OBJECTIVE:                                                                 /74   Pulse 103   Ht 1.6 m (5' 3\")   Wt 112.4 kg (247 lb 12.8 oz)   LMP  (LMP Unknown)   SpO2 96%   BMI 43.90 kg/m          Physical Exam  Vitals and nursing note reviewed.   Constitutional:       Appearance: She is not diaphoretic.   HENT:      Head: Normocephalic and atraumatic.      Right Ear: Tympanic membrane, ear canal and external ear normal.      Left Ear: Tympanic membrane, ear canal and external ear normal.      Nose: Septal deviation (mild, to the right) present. No mucosal edema or rhinorrhea.      Mouth/Throat:      Mouth: Mucous membranes are moist.      Pharynx: Oropharynx is clear. No oropharyngeal exudate.   Eyes:      General:         Right eye: No discharge.         Left eye: No discharge.      Conjunctiva/sclera:      Right eye: Right conjunctiva is not injected.      Left eye: Left conjunctiva is not injected.   Cardiovascular:      Rate and Rhythm: Normal rate and regular rhythm.      Heart sounds: Normal heart sounds. No murmur heard.  Pulmonary:      Effort: Pulmonary effort is normal. No respiratory distress.      Breath sounds: Normal breath sounds and air entry. No decreased breath sounds, wheezing, rhonchi or rales.   Musculoskeletal:         General: Normal range of motion.      Cervical back: Normal range of motion.   Skin:     General: Skin is warm.   Neurological:      Mental Status: She is oriented to person, place, and time.   Psychiatric:         Mood and Affect: Affect is flat.         Behavior: Behavior normal.      Comments: Pressured speech.  Poor eye contact "         ASSESSMENT/PLAN:    Seasonal allergic rhinitis due to pollen  Allergic rhinitis due to animals  Allergic rhinitis caused by mold  Allergic rhinitis due to dust mite  Allergic conjunctivitis of both eyes    Status post allergen immunotherapy [id number].  Currently well controlled with cetirizine 10-20 mg by mouth once daily.   -Continue as is.    - cetirizine (ZYRTEC) 10 MG tablet  Dispense: 30 tablet; Refill: 3    Shortness of breath    -Continue using albuterol inhaler 2-4 puffs every 4 hours as needed for chest tightness/wheezing/shortness of breath/persistent cough.  Start Symbicort 80/4.5 mcg 2 puffs twice daily. Rinse/gargle/spit water after use   -Use both inhalers with chamber device.       - budesonide-formoterol (SYMBICORT) 80-4.5 MCG/ACT Inhaler  Dispense: 10.2 g; Refill: 3       Return in about 2 months (around 7/11/2022), or if symptoms worsen or fail to improve.    Thank you for allowing us to participate in the care of this patient. Please feel free to contact us if there are any questions or concerns about the patient.    Disclaimer: This note consists of symbols derived from keyboarding, dictation and/or voice recognition software. As a result, there may be errors in the script that have gone undetected. Please consider this when interpreting information found in this chart.    Rudy Shin MD, FAAAAI, FACAAI  Allergy, Asthma and Immunology     [facility]       Again, thank you for allowing me to participate in the care of your patient.        Sincerely,        Rudy Shin MD

## 2022-05-11 NOTE — TELEPHONE ENCOUNTER
Pt reporting will be out of victoza medication on the 14th.   Called pt pharmacy. Pt picked up 2 pen supply on 4/26 which would be a 30 day supply at 1.2mg per day. Pt was supposed to do 1 wk at 0.6mg also. Unsure why pt is out of medication so soon. Called pt . No answer .  Left message for pt to call      Richard Russell RN

## 2022-05-11 NOTE — PATIENT INSTRUCTIONS
Continue with cetirizine 10-20 mg by mouth at bedtime as needed for allergies.     -Continue using albuterol inhaler 2-4 puffs every 4 hours as needed for chest tightness/wheezing/shortness of breath/persistent cough.    Start Symbicort 80/4.5 mcg 2 puffs twice daily. rinse/gargle/spit water after use     -Use both inhalers with chamber device.

## 2022-05-11 NOTE — TELEPHONE ENCOUNTER
Reason for Call:  Other prescription    Detailed comments: Pt is calling on the following medication stating she believes she will need refill on the 14 th of this month not able to get until the 15 th.  [facility] in [place].  Also stating she can do .6 of the med on the 14 th if that's advisable and regular does on the 15 th.  She just did not know.  Outpatient Medication Detail     Disp Refills Start End YEE   liraglutide (VICTOZA) 18 MG/3ML solution 6 mL 3 4/22/2022  --   Sig - Route: Inject 1.2 mg Subcutaneous daily Inject 0.6mg daily x 7 days, then increase to 1.2mg daily if tolerating) - Subcutaneous   Sent to pharmacy as: Liraglutide 18 MG/3ML Subcutaneous Solution Pen-injector (VICTOZA)   Class: E-Prescribe   Order: [id number]   E-Prescribing Status: Receipt confirmed by pharmacy (4/22/2022  8:39 AM CDT)       Phone Number Patient can be reached at: Home number on file [phone] (home)    Best Time: any    Can we leave a detailed message on this number? YES    Call taken on 5/11/2022 at 1:11 PM by Nicci Omalley

## 2022-05-11 NOTE — PROGRESS NOTES
SUBJECTIVE:                                                                   Maddy Ortiz presents today to our Allergy Clinic at [facility] for a follow up visit. She is a 36 year old female with allergic rhinitis.  Percutaneous skin puncture testing for aeroallergens performed in November 2016 showed sensitivity to dog, cat, dust mite, molds, pollen of trees, grass, and weeds. In spring-summer 2018, she had a buildup using cluster schedule for allergen immunotherapy.  She reached maintenance dose in July 2018.  One episode of anaphylaxis on September 8, 2020, due to allergen immunotherapy, Red 1:1, 0.5mL   Was given epi x 2.  She tolerated a lower dose x 1 after that. She stopped taking hydroxyzine.  Take cetirizine 10 mg by mouth once daily.  Sometimes, when symptoms are worse, she may take 20 mg by mouth.  She stopped allergen immunotherapy after September 22, 2020.      She stopped Atarax.     She stopped taking hydroxyzine. Take cetirizine 10 mg by mouth once daily. Sometimes, when symptoms are worse, she may take 20 mg by mouth, which makes her feel better.  She does not use any nasal sprays.    Feels that on this current regimen, rhinitis symptoms are well controlled. Does the reason why she does not want to use any nasal sprays. She even has a new cat and does not have a lot of rhinoconjunctivitis symptoms around it.    She has a history of dyspnea on exertion. She was asked several times if albuterol inhaler was helpful or not, and she was not sure. These days, she feels that albuterol is helpful when she uses it. There are days when she feels that she needs it once or twice a day, but in real life, she will use it just once a week. Does not carry it with her all the time.  In November, she had PFT, which showed a mild obstructive/restrictive pattern. There was a questionable postbronchodilator partial reversibility.             Patient Active Problem List   Diagnosis     Animal  dander allergy     CARDIOVASCULAR SCREENING; LDL GOAL LESS THAN 160     Psychosis (H)     Paranoid type delusional disorder (H)     Bipolar 1 disorder (H)     Insomnia     Depression with anxiety     Seasonal allergic rhinitis due to pollen     Allergic rhinitis due to mold     Allergic rhinitis due to animal dander     Allergic rhinitis due to dust mite     Encounter for IUD insertion     Schizoaffective disorder, bipolar type (H)     Gastroesophageal reflux disease without esophagitis     Paranoia (psychosis) (H)     Morbid obesity (H)     Schizoaffective disorder (H)     Umbilical hernia without obstruction and without gangrene     Fatty liver     Diabetes mellitus, type 2 (H)     Elevated LFTs     Disorganized behavior     Infection due to 2019 novel coronavirus     Polypharmacy     Sedated due to multiple medications       Past Medical History:   Diagnosis Date     Bipolar 1 disorder (H) 12/6/2012     Depressive disorder      Diabetes mellitus, type 2 (H) 12/13/2021     Paranoid type delusional disorder (H) 11/14/2012      *Patient is Adopted       Problem (# of Occurrences) Relation (Name,Age of Onset)    Hypertension (1) Sister    Unknown/Adopted (3) Mother, Father, Other        Past Surgical History:   Procedure Laterality Date     TONSILLECTOMY & ADENOIDECTOMY      as a child     TONSILLECTOMY & ADENOIDECTOMY       Social History     Socioeconomic History     Marital status: Single     Spouse name: None     Number of children: None     Years of education: None     Highest education level: None   Occupational History     Employer: CURRENTLY UNEMPLOYED AT THIS TIME   Tobacco Use     Smoking status: Former Smoker     Packs/day: 0.50     Types: Cigarettes     Smokeless tobacco: Former User     Tobacco comment: around 2nd hand smoke   Vaping Use     Vaping Use: Never used   Substance and Sexual Activity     Alcohol use: Not Currently     Drug use: Not Currently     Sexual activity: Not Currently     Partners:  Male     Birth control/protection: Abstinence, None   Other Topics Concern     Parent/sibling w/ CABG, MI or angioplasty before 65F 55M? No   Social History Narrative    One child    Education: some college        October 21, 2021    ENVIRONMENTAL HISTORY: [place] The home is heated with a electric furnace. They do not have central air conditioning, does have box air conditioner in the wall and has air purifier. The patient's bedroom is furnished with stuffed animals in bed, carpeting in bedroom and fabric window coverings. Pets inside the apartment includes 1 cat. There is history of cockroach or mice infestation. There are no smokers in the house.  The apartment does not have a basement.            Review of Systems   Constitutional: Positive for fatigue. Negative for activity change, chills and fever.        Low energy   HENT: Negative for congestion, ear discharge, facial swelling, nosebleeds, postnasal drip, rhinorrhea, sinus pressure and sneezing.    Eyes: Negative for discharge, redness and itching.   Respiratory: Positive for shortness of breath. Negative for cough, chest tightness and wheezing.    Cardiovascular: Negative for chest pain.   Gastrointestinal: Positive for diarrhea. Negative for abdominal pain, nausea and vomiting.   Musculoskeletal: Negative for arthralgias and myalgias.   Skin: Negative for rash.   Allergic/Immunologic: Positive for environmental allergies.   Neurological: Negative for headaches.   Hematological: Negative for adenopathy.   Psychiatric/Behavioral: Positive for behavioral problems. Negative for self-injury.           Current Outpatient Medications:      budesonide-formoterol (SYMBICORT) 80-4.5 MCG/ACT Inhaler, Inhale 2 puffs into the lungs 2 times daily, Disp: 10.2 g, Rfl: 3     cetirizine (ZYRTEC) 10 MG tablet, Take 1-2 tablets (10-20 mg) by mouth nightly as needed for allergies or rhinitis, Disp: 30 tablet, Rfl: 3      gabapentin (NEURONTIN) 300 MG capsule, Take 300 mg in  in afternoon and 600 mg at night., Disp: 120 capsule, Rfl: 3     lithium ER (ESKALITH CR/LITHOBID) 450 MG CR tablet, Take 1 tablet (450 mg) by mouth every evening along with one 300 mg tablet for 750 mg total., Disp: 30 tablet, Rfl: 1     Melatonin 10 MG TABS tablet, Take 1 tablet (10 mg) by mouth nightly as needed for sleep, Disp: 30 tablet, Rfl: 1     metFORMIN (GLUCOPHAGE-XR) 500 MG 24 hr tablet, Take 2 tablets (1,000 mg) by mouth 2 times daily (with meals), Disp: 120 tablet, Rfl: 1     acetaminophen (TYLENOL) 325 MG tablet, Take 2 tablets (650 mg) by mouth every 4 hours as needed for mild pain (to moderate pain), Disp: , Rfl:      albuterol (PROAIR HFA/PROVENTIL HFA/VENTOLIN HFA) 108 (90 Base) MCG/ACT inhaler, Inhale 2 puffs into the lungs every 4 hours as needed for wheezing or shortness of breath / dyspnea, Disp: 18 g, Rfl: 1     ammonium lactate (LAC-HYDRIN) 12 % external cream, Apply topically 2 times daily as needed for dry skin, Disp: 385 g, Rfl: 3     blood glucose (NO BRAND SPECIFIED) test strip, Use to test blood sugar 6 times daily or as directed., Disp: 300 strip, Rfl: 11     EPINEPHrine (ANY BX GENERIC EQUIV) 0.3 MG/0.3ML injection 2-pack, Inject 0.3 mg into the muscle as needed for anaphylaxis, Disp: , Rfl:      fluticasone (FLONASE) 50 MCG/ACT nasal spray, Spray 2 sprays into both nostrils daily, Disp: 15.8 mL, Rfl: 1     hydrOXYzine (ATARAX) 25 MG tablet, Take 1 tablet (25 mg) by mouth every 4 hours as needed for anxiety, Disp: 60 tablet, Rfl: 1     insulin pen needle (32G X 4 MM) 32G X 4 MM miscellaneous, Use 1 pen needles daily with Victoza, Disp: 50 each, Rfl: 11     levonorgestrel (ROMI) 13.5 MG IUD, 1 each (13.5 mg) by Intrauterine route once, Disp: , Rfl:      levothyroxine (SYNTHROID/LEVOTHROID) 50 MCG tablet, Take 1 tablet (50 mcg) by mouth every morning (before breakfast), Disp: 30 tablet, Rfl: 1     liraglutide (VICTOZA) 18  MG/3ML solution, Inject 1.2 mg Subcutaneous daily Inject 0.6mg daily x 7 days, then increase to 1.2mg daily if tolerating), Disp: 6 mL, Rfl: 3     lithium ER (LITHOBID) 300 MG CR tablet, Take 2 tablets (600 mg) by mouth every morning and take 1 tablet every evening along with one 450 mg tablet for 750 mg total in the evening, Disp: 90 tablet, Rfl: 1     mineral oil-hydrophilic petrolatum (AQUAPHOR) external ointment, Apply topically daily as needed for dry skin (apply to dry skin and rash around umbilical hernia), Disp: , Rfl:      OLANZapine (ZYPREXA) 20 MG tablet, Take 1 tablet (20 mg) by mouth every evening, Disp: 30 tablet, Rfl: 1     risperiDONE microspheres ER (RISPERDAL CONSTA) 37.5 MG injection, Inject 2 mLs (37.5 mg) into the muscle every 14 days .  Due 4/7/2022., Disp: 1 each, Rfl: 1     SENEXON-S 8.6-50 MG tablet, Take 1 tablet by mouth At Bedtime, Disp: , Rfl:      terbinafine (LAMISIL AT) 1 % external cream, Apply topically 2 times daily as needed (rash/itching.  Apply to feet.), Disp: 30 g, Rfl: 1  Immunization History   Administered Date(s) Administered     COVID-19,FARA,Pfizer (12+ Yrs) 05/18/2021, 06/08/2021, 01/19/2022     DTAP (<7y) 03/01/1985, 06/01/1985, 07/01/1985, 07/01/1986, 07/26/1990     HPV Quadrivalent 12/12/2008, 09/06/2011     Hep B, Peds or Adolescent 02/16/1998     Historical DTP/aP 03/01/1985, 06/01/1985, 07/01/1985, 07/01/1986, 07/26/1990     Historical Hepb 02/16/1998     Influenza (IIV3) PF 12/09/2008, 12/06/2012     Influenza Vaccine IM > 6 months Valent IIV4 (Alfuria,Fluzone) 11/21/2017, 01/03/2019, 09/17/2019, 09/16/2020     MMR 02/27/1986, 07/17/1997     Poliovirus, inactivated (IPV) 06/01/1985, 07/01/1985, 01/01/1987, 07/26/1990     TDAP Vaccine (Adacel) 06/30/1997, 03/31/2008, 05/14/2010, 12/06/2012     Td (Adult), Adsorbed 06/30/1997     Allergies   Allergen Reactions     Dust Mites Shortness Of Breath     Animal Dander      Other reaction(s): *Unknown     Metformin Fatigue  "    Patient is taking extended release at home as of 3/3/22     Mold      Other reaction(s): Runny Nose     Trees      OBJECTIVE:                                                                 /74   Pulse 103   Ht 1.6 m (5' 3\")   Wt 112.4 kg (247 lb 12.8 oz)   LMP  (LMP Unknown)   SpO2 96%   BMI 43.90 kg/m          Physical Exam  Vitals and nursing note reviewed.   Constitutional:       Appearance: She is not diaphoretic.   HENT:      Head: Normocephalic and atraumatic.      Right Ear: Tympanic membrane, ear canal and external ear normal.      Left Ear: Tympanic membrane, ear canal and external ear normal.      Nose: Septal deviation (mild, to the right) present. No mucosal edema or rhinorrhea.      Mouth/Throat:      Mouth: Mucous membranes are moist.      Pharynx: Oropharynx is clear. No oropharyngeal exudate.   Eyes:      General:         Right eye: No discharge.         Left eye: No discharge.      Conjunctiva/sclera:      Right eye: Right conjunctiva is not injected.      Left eye: Left conjunctiva is not injected.   Cardiovascular:      Rate and Rhythm: Normal rate and regular rhythm.      Heart sounds: Normal heart sounds. No murmur heard.  Pulmonary:      Effort: Pulmonary effort is normal. No respiratory distress.      Breath sounds: Normal breath sounds and air entry. No decreased breath sounds, wheezing, rhonchi or rales.   Musculoskeletal:         General: Normal range of motion.      Cervical back: Normal range of motion.   Skin:     General: Skin is warm.   Neurological:      Mental Status: She is oriented to person, place, and time.   Psychiatric:         Mood and Affect: Affect is flat.         Behavior: Behavior normal.      Comments: Pressured speech.  Poor eye contact         ASSESSMENT/PLAN:    Seasonal allergic rhinitis due to pollen  Allergic rhinitis due to animals  Allergic rhinitis caused by mold  Allergic rhinitis due to dust mite  Allergic conjunctivitis of both eyes    Status " post allergen immunotherapy [id number].  Currently well controlled with cetirizine 10-20 mg by mouth once daily.   -Continue as is.    - cetirizine (ZYRTEC) 10 MG tablet  Dispense: 30 tablet; Refill: 3    Shortness of breath    -Continue using albuterol inhaler 2-4 puffs every 4 hours as needed for chest tightness/wheezing/shortness of breath/persistent cough.  Start Symbicort 80/4.5 mcg 2 puffs twice daily. Rinse/gargle/spit water after use   -Use both inhalers with chamber device.       - budesonide-formoterol (SYMBICORT) 80-4.5 MCG/ACT Inhaler  Dispense: 10.2 g; Refill: 3       Return in about 2 months (around 7/11/2022), or if symptoms worsen or fail to improve.    Thank you for allowing us to participate in the care of this patient. Please feel free to contact us if there are any questions or concerns about the patient.    Disclaimer: This note consists of symbols derived from keyboarding, dictation and/or voice recognition software. As a result, there may be errors in the script that have gone undetected. Please consider this when interpreting information found in this chart.    Rudy Shin MD, FAAAAI, FACAAI  Allergy, Asthma and Immunology     [facility]

## 2022-05-17 ENCOUNTER — TELEPHONE (OUTPATIENT)
Dept: ALLERGY | Facility: OTHER | Age: 37
End: 2022-05-17
Payer: MEDICARE

## 2022-05-17 NOTE — TELEPHONE ENCOUNTER
budesonide-formoterol (SYMBICORT) 80-4.5 MCG/ACT Inhaler    Pharmacy message: Alternative requested -non formulary drug    [facility]

## 2022-05-18 NOTE — TELEPHONE ENCOUNTER
Called pharmacy, they said patient was able to fill brand Symbicort on 5/13/22 and it was covered at $0 copay.  No action needed.    Nessa Sandhu RN

## 2022-05-19 ENCOUNTER — OFFICE VISIT (OUTPATIENT)
Dept: FAMILY MEDICINE | Facility: CLINIC | Age: 37
End: 2022-05-19
Payer: MEDICARE

## 2022-05-19 VITALS
WEIGHT: 246 LBS | OXYGEN SATURATION: 98 % | SYSTOLIC BLOOD PRESSURE: 124 MMHG | BODY MASS INDEX: 43.59 KG/M2 | HEIGHT: 63 IN | HEART RATE: 84 BPM | RESPIRATION RATE: 16 BRPM | TEMPERATURE: 98.6 F | DIASTOLIC BLOOD PRESSURE: 88 MMHG

## 2022-05-19 DIAGNOSIS — R19.7 DIARRHEA, UNSPECIFIED TYPE: ICD-10-CM

## 2022-05-19 DIAGNOSIS — E11.9 TYPE 2 DIABETES MELLITUS WITHOUT COMPLICATION, WITHOUT LONG-TERM CURRENT USE OF INSULIN (H): Primary | ICD-10-CM

## 2022-05-19 LAB
CREAT UR-MCNC: 140 MG/DL
MICROALBUMIN UR-MCNC: 112 MG/L
MICROALBUMIN/CREAT UR: 80 MG/G CR (ref 0–25)

## 2022-05-19 PROCEDURE — 99214 OFFICE O/P EST MOD 30 MIN: CPT | Performed by: NURSE PRACTITIONER

## 2022-05-19 PROCEDURE — 82043 UR ALBUMIN QUANTITATIVE: CPT | Performed by: NURSE PRACTITIONER

## 2022-05-19 RX ORDER — TRAZODONE HYDROCHLORIDE 50 MG/1
TABLET, FILM COATED ORAL
Status: ON HOLD | COMMUNITY
Start: 2022-05-04 | End: 2022-06-22

## 2022-05-19 RX ORDER — RISPERIDONE 0.5 MG/1
TABLET ORAL
COMMUNITY
Start: 2022-04-03 | End: 2022-05-19

## 2022-05-19 RX ORDER — METFORMIN HCL 500 MG
500 TABLET, EXTENDED RELEASE 24 HR ORAL 2 TIMES DAILY WITH MEALS
Qty: 180 TABLET | Refills: 3 | Status: SHIPPED | OUTPATIENT
Start: 2022-05-19 | End: 2022-06-01 | Stop reason: SINTOL

## 2022-05-19 ASSESSMENT — PATIENT HEALTH QUESTIONNAIRE - PHQ9
SUM OF ALL RESPONSES TO PHQ QUESTIONS 1-9: 8
SUM OF ALL RESPONSES TO PHQ QUESTIONS 1-9: 8
10. IF YOU CHECKED OFF ANY PROBLEMS, HOW DIFFICULT HAVE THESE PROBLEMS MADE IT FOR YOU TO DO YOUR WORK, TAKE CARE OF THINGS AT HOME, OR GET ALONG WITH OTHER PEOPLE: SOMEWHAT DIFFICULT

## 2022-05-19 ASSESSMENT — PAIN SCALES - GENERAL: PAINLEVEL: MILD PAIN (3)

## 2022-05-19 NOTE — PROGRESS NOTES
Assessment & Plan     Type 2 diabetes mellitus without complication, without long-term current use of insulin (H)  Patient reports improved blood sugars with the Victoza.  Will decrease metformin due to diarrhea.  Recheck A1c in July.  - Albumin Random Urine Quantitative with Creat Ratio; Future  - OPTOMETRY REFERRAL; Future  - metFORMIN (GLUCOPHAGE XR) 500 MG 24 hr tablet; Take 1 tablet (500 mg) by mouth 2 times daily (with meals)    Diarrhea, unspecified type  Possibly due to the metformin.  Will decrease dose to 500 mg BID and see if that helps.        Patient Instructions   Schedule a diabetic eye exam      Decrease metformin to one tablet twice daily.      Return in about 2 months (around 7/19/2022) for Diabetes Recheck.      The risks, benefits and treatment options of prescribed medications or other treatments have been discussed with the patient. The patient verbalized their understanding and should call or follow up if no improvement or if they develop further problems.    PHILL Luo [facility] SHARLA Castañeda is a 36 year old who presents for the following health issues     History of Present Illness       Reason for visit:  Irritable tummy  Symptom onset:  3-4 weeks ago  Symptom intensity:  Severe  Symptom progression:  Staying the same  Had these symptoms before:  Yes  Has tried/received treatment for these symptoms:  Yes  Previous treatment was successful:  Yes  Prior treatment description:  Meds  What makes it worse:  No  What makes it better:  Exercise    She eats 0-1 servings of fruits and vegetables daily.She consumes 1 sweetened beverage(s) daily.She exercises with enough effort to increase her heart rate 60 or more minutes per day.  She exercises with enough effort to increase her heart rate 6 days per week. She is missing 2 dose(s) of medications per week.    Today's PHQ-9         PHQ-9 Total Score: 8    PHQ-9 Q9 Thoughts of better off  "dead/self-harm past 2 weeks :   Not at all    How difficult have these problems made it for you to do your work, take care of things at home, or get along with other people: Somewhat difficult     Concern - \"tummy troubles\"  Onset: years    Description:   Patient reports occasional diarrhea  Bloating  Tender to the touch    Intensity: moderate    Progression of Symptoms:  worsening    Accompanying Signs & Symptoms:  Denies nausea  Denies vomiting recently  Denies constipation  Has had 5 loose stools in the last 24 hours.    Previous history of similar problem:   yes    Precipitating factors:   Worsened by: patient reports possible medication issue, but she is worried about changing any psych medications because she is feeling so good.    Alleviating factors:  Improved by: nothing    Therapies Tried and outcome: nothing.         Diabetes Follow-up    How often are you checking your blood sugar? One time daily  What time of day are you checking your blood sugars (select all that apply)?  Before meals  Have you had any blood sugars above 200?  No  Have you had any blood sugars below 70?  No    What symptoms do you notice when your blood sugar is low?  None    What concerns do you have today about your diabetes? None     Do you have any of these symptoms? (Select all that apply)  No numbness or tingling in feet.  No redness, sores or blisters on feet.  No complaints of excessive thirst.  No reports of blurry vision.  No significant changes to weight.    Have you had a diabetic eye exam in the last 12 months? No        BP Readings from Last 2 Encounters:   05/19/22 124/88   05/11/22 118/74     Hemoglobin A1C POCT (%)   Date Value   06/09/2013 4.7     Hemoglobin A1C (%)   Date Value   04/08/2022 7.3 (H)   01/08/2022 8.4 (H)     LDL Cholesterol Calculated (mg/dL)   Date Value   03/04/2022 114 (H)   07/14/2021 130 (H)                 Review of Systems   Constitutional, HEENT, cardiovascular, pulmonary, gi and gu systems are " "negative, except as otherwise noted.      Objective    /88 (BP Location: Right arm)   Pulse 84   Temp 98.6  F (37  C) (Tympanic)   Resp 16   Ht 1.6 m (5' 3\")   Wt 111.6 kg (246 lb)   LMP  (LMP Unknown)   SpO2 98%   BMI 43.58 kg/m    Body mass index is 43.58 kg/m .  Physical Exam   GENERAL: healthy, alert and no distress  NECK: no adenopathy, no asymmetry, masses, or scars and thyroid normal to palpation  RESP: lungs clear to auscultation - no rales, rhonchi or wheezes  CV: regular rate and rhythm, normal S1 S2, no S3 or S4, no murmur, click or rub, no peripheral edema and peripheral pulses strong  ABDOMEN: soft, nontender, no hepatosplenomegaly, no masses and bowel sounds normal  MS: no gross musculoskeletal defects noted, no edema                "

## 2022-05-20 DIAGNOSIS — E11.9 TYPE 2 DIABETES MELLITUS WITHOUT COMPLICATION, WITHOUT LONG-TERM CURRENT USE OF INSULIN (H): ICD-10-CM

## 2022-05-23 RX ORDER — LIRAGLUTIDE 6 MG/ML
1.2 INJECTION SUBCUTANEOUS DAILY
Qty: 6 ML | Refills: 3 | Status: SHIPPED | OUTPATIENT
Start: 2022-05-23 | End: 2022-07-22

## 2022-05-24 ENCOUNTER — TELEPHONE (OUTPATIENT)
Dept: FAMILY MEDICINE | Facility: CLINIC | Age: 37
End: 2022-05-24
Payer: MEDICARE

## 2022-05-24 DIAGNOSIS — J30.1 SEASONAL ALLERGIC RHINITIS DUE TO POLLEN: ICD-10-CM

## 2022-05-24 RX ORDER — CETIRIZINE HYDROCHLORIDE 10 MG/1
10-20 TABLET ORAL
Qty: 90 TABLET | Refills: 3 | Status: CANCELLED | OUTPATIENT
Start: 2022-05-24

## 2022-05-24 RX ORDER — CETIRIZINE HYDROCHLORIDE 10 MG/1
10-20 TABLET ORAL
Qty: 90 TABLET | Refills: 0 | Status: SHIPPED | OUTPATIENT
Start: 2022-05-24 | End: 2022-10-28

## 2022-05-24 NOTE — TELEPHONE ENCOUNTER
Pharmacy fax received asking for a 90 day supply.       Requested Prescriptions   Pending Prescriptions Disp Refills     cetirizine (ZYRTEC) 10 MG tablet 90 tablet 3     Sig: Take 1-2 tablets (10-20 mg) by mouth nightly as needed for allergies or rhinitis       There is no refill protocol information for this order              Elaine Sparks MA

## 2022-05-24 NOTE — TELEPHONE ENCOUNTER
Reason for Call: Request for an order or referral:    Order or referral being requested: Pt is requesting a Speech Therapist in her area, Lives in [place],  For Studering, maybe language barrier, English is her 1st, language, please she her Diagnoses.      Date needed: as soon as possible    Has the patient been seen by the PCP for this problem? Not Applicable    Additional comments: Pt states has been going on for awhile.    Phone number Patient can be reached at:  Home number on file [phone] (home)    Best Time:  any    Can we leave a detailed message on this number?  YES    Call taken on 5/24/2022 at 10:24 AM by Nicci Omalley

## 2022-05-24 NOTE — CONFIDENTIAL NOTE
Prescription approved per [facility] Refill Protocol.     Juarez Beatty RN on 5/24/2022 at 1:35 PM

## 2022-05-26 NOTE — TELEPHONE ENCOUNTER
She should schedule an appointment with me to discuss further - may be virtual  Shirley Freeman, CNP

## 2022-05-27 NOTE — TELEPHONE ENCOUNTER
2nd attempt. Left VM instructing patient to schedule appt, per PCP.     Chiquis Roe RN  [facility]

## 2022-05-31 ENCOUNTER — TELEPHONE (OUTPATIENT)
Dept: SURGERY | Facility: CLINIC | Age: 37
End: 2022-05-31

## 2022-05-31 ENCOUNTER — VIRTUAL VISIT (OUTPATIENT)
Dept: SURGERY | Facility: CLINIC | Age: 37
End: 2022-05-31

## 2022-05-31 ENCOUNTER — MYC MEDICAL ADVICE (OUTPATIENT)
Dept: FAMILY MEDICINE | Facility: CLINIC | Age: 37
End: 2022-05-31

## 2022-05-31 DIAGNOSIS — E11.9 TYPE 2 DIABETES MELLITUS WITHOUT COMPLICATION, WITHOUT LONG-TERM CURRENT USE OF INSULIN (H): ICD-10-CM

## 2022-05-31 DIAGNOSIS — K21.9 GASTROESOPHAGEAL REFLUX DISEASE WITHOUT ESOPHAGITIS: ICD-10-CM

## 2022-05-31 DIAGNOSIS — E66.01 MORBID OBESITY (H): Primary | ICD-10-CM

## 2022-05-31 PROCEDURE — 99214 OFFICE O/P EST MOD 30 MIN: CPT | Mod: 95 | Performed by: FAMILY MEDICINE

## 2022-05-31 NOTE — PROGRESS NOTES
Maddy Ortiz is 36 year old  female who presents for a billable video visit today.    How would you like to obtain your AVS? MyChart  If dropped from the video visit, the video invitation should be resent by: Text to cell phone: [phone]  Will anyone else be joining your video visit? No      Video Start Time: 11:13 AM    Are there any specific questions or needs that you would like addressed at your visit today? Return MWM         Video-Visit Details    Type of service:  Video Visit    Video End Time (time video stopped): 11:43 am  Originating Location (pt. Location): Home    Distant Location (provider location):  [facility] [place]     Platform used for Video Visit: Perri

## 2022-05-31 NOTE — LETTER
5/31/2022         RE: Maddy Ortiz  [address]  [facility] [address]        Dear Colleague,    Thank you for referring your patient, Maddy Ortiz, to the [facility] AND [facility] [place]. Please see a copy of my visit note below.    Bariatric Care Clinic Non Surgical Follow up Visit   Date of visit: 5/31/2022  Physician: LONDON Serna MD, MD  Primary Care is Shirley Freeman.  Maddy Ortiz   36 year old  female     Initial Weight: 202#  Initial BMI: 34.14  Today's Weight:   Wt Readings from Last 1 Encounters:   05/31/22 111.6 kg (246 lb)     There is no height or weight on file to calculate BMI.           Assessment and Plan   Assessment: Maddy is a 36 year old year old female who presents for medical weight management.      Plan:    1. Morbid obesity (H)  Patient was congratulated on her success thus far. Healthy habits to assist with further weight loss were discussed. She will continue the liraglutide. We discussed the patient's co-morbid conditions including type 2 DM and GERD. These likely will improve with healthy habits and weight loss.    2. Type 2 diabetes mellitus without complication, without long-term current use of insulin (H)  This may improve with healthy habits and weight loss.    3. Gastroesophageal reflux disease without esophagitis  This may improve with healthy habits and weight loss.    Follow up in 1 month with our dietician and in 3 months with myself           INTERIM HISTORY  Patient had an IUD placed. Her primary MD put her on liraglutide. She feels that it is helping to control her appetite. She still complains that her stomach is hard and is sticking out. She denies constipation. She is using the food cart. Patient states that she buys unhealthy food for her daughter and then she can't resist eating and drinking it.    DIETARY HISTORY  Meals Per Day: 3  Eating Protein First?: sometimes  Food Diary: B:muffins and coffee with sugar,  sometimes eggs with castillo L:peanut butter and jelly sandwich D:sometimes vegetables, sometimes chicken breast  Snacks Per Day: varies  Typical Snack: potato chips, rice cakes  Fluid Intake: working on it  Portion Control: working on it  Calorie Containing Beverages: coffee with sugar, sugared soda- not able to give me a clear answer on how much she is drinking  Typical Protein Food Choices: peanut butter  Choosing Whole Grains: not typically  Meals at Restaurant per week:not discussed    Positive Changes Since Last Visit: cut back on soda, meditation  Struggling With: eating baked goods, chips, drinking soda    Knowledgeable in Reading Food Labels: not sure  Getting Adequate Protein: not always      PHYSICAL ACTIVITY PATTERNS:  Biking and swimming, ADLs    REVIEW OF SYSTEMS  GENERAL/CONSTITUTIONAL:  Fatigue: yes  HEENT:  Vision changes, glaucoma: no  CARDIOVASCULAR:  Chest Pain with Exertion: no  PULMONARY:  Dyspnea on exertion: yes  PSYCHIATRIC:  Moods: stable  ENDOCRINE:  No personal or family history of medullary thyroid cancer no  :  Birth control: IUD       Patient Profile   Social History     Social History Narrative    One child    Education: some college        October 21, 2021    ENVIRONMENTAL HISTORY: [place] The home is heated with a electric furnace. They do not have central air conditioning, does have box air conditioner in the wall and has air purifier. The patient's bedroom is furnished with stuffed animals in bed, carpeting in bedroom and fabric window coverings. Pets inside the apartment includes 1 cat. There is history of cockroach or mice infestation. There are no smokers in the house.  The apartment does not have a basement.         Past Medical History   Past Medical History:   Diagnosis Date     Bipolar 1 disorder (H) 12/6/2012     Depressive disorder      Diabetes mellitus, type 2 (H) 12/13/2021     Paranoid type delusional disorder (H)  11/14/2012     Patient Active Problem List   Diagnosis     Animal dander allergy     CARDIOVASCULAR SCREENING; LDL GOAL LESS THAN 160     Psychosis (H)     Paranoid type delusional disorder (H)     Bipolar 1 disorder (H)     Insomnia     Depression with anxiety     Seasonal allergic rhinitis due to pollen     Allergic rhinitis due to mold     Allergic rhinitis due to animal dander     Allergic rhinitis due to dust mite     Encounter for IUD insertion     Schizoaffective disorder, bipolar type (H)     Gastroesophageal reflux disease without esophagitis     Paranoia (psychosis) (H)     Morbid obesity (H)     Schizoaffective disorder (H)     Umbilical hernia without obstruction and without gangrene     Fatty liver     Diabetes mellitus, type 2 (H)     Elevated LFTs     Disorganized behavior     Infection due to 2019 novel coronavirus     Polypharmacy     Sedated due to multiple medications       Past Surgical History  She has a past surgical history that includes tonsillectomy & adenoidectomy and tonsillectomy & adenoidectomy.     Examination   LMP  (LMP Unknown)   GENERAL: Healthy, alert and no distress  EYES: Eyes grossly normal to inspection.  No discharge or erythema, or obvious scleral/conjunctival abnormalities.  RESP: No audible wheeze, cough, or visible cyanosis.  No visible retractions or increased work of breathing.    SKIN: Visible skin clear. No significant rash, abnormal pigmentation or lesions.  NEURO: Cranial nerves grossly intact.  Mentation and speech appropriate for age.  PSYCH: Mentation appears normal, affect normal/bright, judgement and insight intact, normal speech and appearance well-groomed.       Counseling:   We reviewed the important post op bariatric recommendations:  -eating 3 meals daily  -eating protein first, getting >60gm protein daily  -eating slowly, chewing food well  -avoiding/limiting calorie containing beverages  -limiting starchy vegetables and carbohydrates, choosing wheat, not  white with breads,   crackers, pastas, marquita, bagels, tortillas, rice  -limiting restaurant or cafeteria eating to twice a week or less    We discussed the importance of restorative sleep and stress management in maintaining a healthy weight.  We discussed the National Weight Control Registry healthy weight maintenance strategies and ways to optimize metabolism.  We discussed the importance of physical activity including cardiovascular and strength training in maintaining a healthier weight.    Total time spent on the date of this encounter doing: chart review, review of test results, patient visit, physical exam, education, counseling, developing plan of care and documenting = 32 minutes.         LONDON Serna MD  [facility]             Maddy Ortiz is 36 year old  female who presents for a billable video visit today.    How would you like to obtain your AVS? MyChart  If dropped from the video visit, the video invitation should be resent by: Text to cell phone: [phone]  Will anyone else be joining your video visit? No  {If patient encounters technical issues they should call [phone] :817596}    Video Start Time: 11:13 AM    Are there any specific questions or needs that you would like addressed at your visit today? Return MWM     Provider Notes: {***}      Video-Visit Details    Type of service:  Video Visit    Video End Time (time video stopped): {video visit now:840296}  Originating Location (pt. Location): Home    Distant Location (provider location):  [facility] [place]     Platform used for Video Visit: [facility]      Again, thank you for allowing me to participate in the care of your patient.        Sincerely,        LONDON Serna MD

## 2022-05-31 NOTE — TELEPHONE ENCOUNTER
Pt having all sorts of probs connecting up for her visit. Can someone pls call her, or resend Dox text?    [phone]  **OK to leave detailed VM

## 2022-05-31 NOTE — PATIENT INSTRUCTIONS
Eat Better ? Move More ? Live Well    Eat 3 nutrient-rich meals each day    Don t skip meals--it will cause you to overeat later in the day!    Eating fiber (vegetables/fruits/whole grains) and protein with meals helps you stay full longer    Choose foods with less than 10 grams of sugar and 5 grams of fat per serving to prevent excess calories and weight re-gain  Eat around the same times each day to develop a routine eating schedule   Avoid snacking unless physically hungry.   Planned snacks: 1-2 times per day and no more than 150 calories    Eat protein first   Protein helps with healing, maintaining adequate muscle mass, reducing hunger and optimizing nutritional status   Aim for 60-80 grams of protein per day   Fill up on Fiber   Fiber comes from plants--fruits, veggies, whole grains, nuts/seeds and beans   Fiber is low in calories, high in phytonutrients and helps you stay full longer   Aim for 25-35 grams per day by eating fiber with meals and snacks  Eat S-[id number]   Take 20-30 minutes to eat each meal by taking small bites, chewing foods to applesauce consistency or 20-30 times before you swallow   Eating foods too fast can delay satiety/fullness signals and increase overeating   Slow down your eating by using toddler utensils, putting your fork/spoon down between bites and not watching TV or emailing during meals!   Keep a Journal         Writing down what you eat, how you feel and when you are active helps you identify new changes to work on from week to week         Look for ways to cut 100 calories from your current diet 2-3 times per day  Drink 64 ounces of 0-Calorie drinks between meals   Water   Zero calorie Propel  or Vitamin Water     SoBe [employer]  Zero Calories   Crystal Light , Sugar-Free Brown-Aid , and other sugar-free lemonade or flavored lara   Keep Caffeine to less than 300mg per day ie: 3-6oz cups coffee     Work up to 45-60 minutes of physical activity most days of the week   Helps  with losing weight and prevent regaining those extra pounds!    Do a combo of cardio (walking/water exercises) and strength training (lifting weights/Vinyasa yoga)    Avoid Mindless Eating   Be present when you eat--take note of the smell, taste and quality of your food   Make a list of alternative activities you could do to prevent eating out of boredom/stress  Go for a walk, call a friend, chew gum, paint your nails, re-organize the garage, etc

## 2022-05-31 NOTE — PROGRESS NOTES
Bariatric Care Clinic Non Surgical Follow up Visit   Date of visit: 5/31/2022  Physician: LONDON Serna MD, MD  Primary Care is Shirley Freeman.  Maddy Ortiz   36 year old  female     Initial Weight: 202#  Initial BMI: 34.14  Today's Weight:   Wt Readings from Last 1 Encounters:   05/31/22 111.6 kg (246 lb)     There is no height or weight on file to calculate BMI.           Assessment and Plan   Assessment: Maddy is a 36 year old year old female who presents for medical weight management.      Plan:    1. Morbid obesity (H)  Patient was congratulated on her success thus far. Healthy habits to assist with further weight loss were discussed. She will continue the liraglutide. We discussed the patient's co-morbid conditions including type 2 DM and GERD. These likely will improve with healthy habits and weight loss.    2. Type 2 diabetes mellitus without complication, without long-term current use of insulin (H)  This may improve with healthy habits and weight loss.    3. Gastroesophageal reflux disease without esophagitis  This may improve with healthy habits and weight loss.    Follow up in 1 month with our dietician and in 3 months with myself           INTERIM HISTORY  Patient had an IUD placed. Her primary MD put her on liraglutide. She feels that it is helping to control her appetite. She still complains that her stomach is hard and is sticking out. She denies constipation. She is using the food cart. Patient states that she buys unhealthy food for her daughter and then she can't resist eating and drinking it.    DIETARY HISTORY  Meals Per Day: 3  Eating Protein First?: sometimes  Food Diary: B:muffins and coffee with sugar, sometimes eggs with castillo L:peanut butter and jelly sandwich D:sometimes vegetables, sometimes chicken breast  Snacks Per Day: varies  Typical Snack: potato chips, rice cakes  Fluid Intake: working on it  Portion Control: working on it  Calorie Containing Beverages: coffee with sugar,  sugared soda- not able to give me a clear answer on how much she is drinking  Typical Protein Food Choices: peanut butter  Choosing Whole Grains: not typically  Meals at Restaurant per week:not discussed    Positive Changes Since Last Visit: cut back on soda, meditation  Struggling With: eating baked goods, chips, drinking soda    Knowledgeable in Reading Food Labels: not sure  Getting Adequate Protein: not always      PHYSICAL ACTIVITY PATTERNS:  Biking and swimming, ADLs    REVIEW OF SYSTEMS  GENERAL/CONSTITUTIONAL:  Fatigue: yes  HEENT:  Vision changes, glaucoma: no  CARDIOVASCULAR:  Chest Pain with Exertion: no  PULMONARY:  Dyspnea on exertion: yes  PSYCHIATRIC:  Moods: stable  ENDOCRINE:  No personal or family history of medullary thyroid cancer no  :  Birth control: IUD       Patient Profile   Social History     Social History Narrative    One child    Education: some college        October 21, 2021    ENVIRONMENTAL HISTORY: [place] The home is heated with a electric furnace. They do not have central air conditioning, does have box air conditioner in the wall and has air purifier. The patient's bedroom is furnished with stuffed animals in bed, carpeting in bedroom and fabric window coverings. Pets inside the apartment includes 1 cat. There is history of cockroach or mice infestation. There are no smokers in the house.  The apartment does not have a basement.         Past Medical History   Past Medical History:   Diagnosis Date     Bipolar 1 disorder (H) 12/6/2012     Depressive disorder      Diabetes mellitus, type 2 (H) 12/13/2021     Paranoid type delusional disorder (H) 11/14/2012     Patient Active Problem List   Diagnosis     Animal dander allergy     CARDIOVASCULAR SCREENING; LDL GOAL LESS THAN 160     Psychosis (H)     Paranoid type delusional disorder (H)     Bipolar 1 disorder (H)     Insomnia     Depression with anxiety     Seasonal allergic  rhinitis due to pollen     Allergic rhinitis due to mold     Allergic rhinitis due to animal dander     Allergic rhinitis due to dust mite     Encounter for IUD insertion     Schizoaffective disorder, bipolar type (H)     Gastroesophageal reflux disease without esophagitis     Paranoia (psychosis) (H)     Morbid obesity (H)     Schizoaffective disorder (H)     Umbilical hernia without obstruction and without gangrene     Fatty liver     Diabetes mellitus, type 2 (H)     Elevated LFTs     Disorganized behavior     Infection due to 2019 novel coronavirus     Polypharmacy     Sedated due to multiple medications       Past Surgical History  She has a past surgical history that includes tonsillectomy & adenoidectomy and tonsillectomy & adenoidectomy.     Examination   LMP  (LMP Unknown)   GENERAL: Healthy, alert and no distress  EYES: Eyes grossly normal to inspection.  No discharge or erythema, or obvious scleral/conjunctival abnormalities.  RESP: No audible wheeze, cough, or visible cyanosis.  No visible retractions or increased work of breathing.    SKIN: Visible skin clear. No significant rash, abnormal pigmentation or lesions.  NEURO: Cranial nerves grossly intact.  Mentation and speech appropriate for age.  PSYCH: Mentation appears normal, affect normal/bright, judgement and insight intact, normal speech and appearance well-groomed.       Counseling:   We reviewed the important post op bariatric recommendations:  -eating 3 meals daily  -eating protein first, getting >60gm protein daily  -eating slowly, chewing food well  -avoiding/limiting calorie containing beverages  -limiting starchy vegetables and carbohydrates, choosing wheat, not white with breads,   crackers, pastas, marquita, bagels, tortillas, rice  -limiting restaurant or cafeteria eating to twice a week or less    We discussed the importance of restorative sleep and stress management in maintaining a healthy weight.  We discussed the National Weight Control  Registry healthy weight maintenance strategies and ways to optimize metabolism.  We discussed the importance of physical activity including cardiovascular and strength training in maintaining a healthier weight.    Total time spent on the date of this encounter doing: chart review, review of test results, patient visit, physical exam, education, counseling, developing plan of care and documenting = 32 minutes.         LONDON Serna MD  [facility]

## 2022-06-01 ENCOUNTER — VIRTUAL VISIT (OUTPATIENT)
Dept: FAMILY MEDICINE | Facility: CLINIC | Age: 37
End: 2022-06-01
Payer: MEDICARE

## 2022-06-01 DIAGNOSIS — E11.9 TYPE 2 DIABETES MELLITUS WITHOUT COMPLICATION, WITHOUT LONG-TERM CURRENT USE OF INSULIN (H): ICD-10-CM

## 2022-06-01 DIAGNOSIS — R47.9 SPEECH PROBLEM: Primary | ICD-10-CM

## 2022-06-01 PROCEDURE — 99213 OFFICE O/P EST LOW 20 MIN: CPT | Mod: 95 | Performed by: NURSE PRACTITIONER

## 2022-06-01 NOTE — PROGRESS NOTES
"Maddy is a 36 year old who is being evaluated via a billable video visit.      How would you like to obtain your AVS? MyChart  If the video visit is dropped, the invitation should be resent by: Text to cell phone: [phone]  Will anyone else be joining your video visit? No  {If patient encounters technical issues they should call [phone] :173420}    Video Start Time: 11:33 AM    {PROVIDER CHARTING PREFERENCE:677966}          Subjective   Maddy is a 36 year old who presents for the following health issues     HPI     Chief Complaint   Patient presents with     Referral     Speech therapy       Problem since she was a child  Doesn't always know what to say - then she second guesses herself.  Doesn't know what to order at restaurants.  Repeats herself.  Sometimes stutters.   Linked to mental health issues.        Diabetes:  Patient would like to discontinue the metformin entirely.  States that her stomach symptoms of bloating and discomfort are improved on the lower dose but not completely resolved.  She has started the Victoza and is tolerating that well.      {SUPERLIST (Optional):569846}  {additonal problems for provider to add (Optional):674649}    Review of Systems   Constitutional, HEENT, cardiovascular, pulmonary, gi and gu systems are negative, except as otherwise noted.      Objective           Vitals:  No vitals were obtained today due to virtual visit.    Physical Exam   {video visit exam brief selected:063983::\"GENERAL: Healthy, alert and no distress\",\"EYES: Eyes grossly normal to inspection.  No discharge or erythema, or obvious scleral/conjunctival abnormalities.\",\"RESP: No audible wheeze, cough, or visible cyanosis.  No visible retractions or increased work of breathing.  \",\"SKIN: Visible skin clear. No significant rash, abnormal pigmentation or lesions.\",\"NEURO: Cranial nerves grossly intact.  Mentation and speech appropriate for age.\",\"PSYCH: Mentation appears normal, affect normal/bright, " "judgement and insight intact, normal speech and appearance well-groomed.\"}    {Diagnostic Test Results (Optional):620416}    {AMBULATORY ATTESTATION (Optional):434624}        Video-Visit Details    Type of service:  Video Visit    Video End Time:{video visit start/end time for provider to select:951858}    Originating Location (pt. Location): {video visit patient location:260265::\"Home\"}    Distant Location (provider location):  [facility]     Platform used for Video Visit: {Virtual Visit Platforms:703320::\"[employer]\"}  "

## 2022-06-01 NOTE — PROGRESS NOTES
Maddy is a 36 year old who is being evaluated via a billable telephone visit.      What phone number would you like to be contacted at? Text to cell phone: [phone]  How would you like to obtain your AVS? MyChart    Assessment & Plan     Speech problem  Unclear if this is a primary speech issue, cognitive issue or mental health issue.  Will send for speech eval and go from there.  - Speech Therapy Referral; Future    Type 2 diabetes mellitus without complication, without long-term current use of insulin (H)  Patient would like to discontinue to metformin altogether to see if this helps her stomach issues.  She may discontinue for now.  Continue the Victoza.  Recheck in 3 months.      The risks, benefits and treatment options of prescribed medications or other treatments have been discussed with the patient. The patient verbalized their understanding and should call or follow up if no improvement or if they develop further problems.    [facility]            Subjective   Maddy is a 36 year old who presents for the following health issues     HPI     Chief Complaint   Patient presents with     Referral     Speech therapy       Problem since she was a child  Doesn't always know what to say - then she second guesses herself.  Doesn't know what to order at restaurants.  Repeats herself.  Sometimes stutters.   Linked to mental health issues.        Diabetes:  Patient would like to discontinue the metformin entirely.  States that her stomach symptoms of bloating and discomfort are improved on the lower dose but not completely resolved.  She has started the Victoza and is tolerating that well.            Review of Systems   Constitutional, HEENT, cardiovascular, pulmonary, gi and gu systems are negative, except as otherwise noted.            Objective           Vitals:  No vitals were obtained today due to virtual visit.    Physical Exam   PSYCH: Alert and oriented times  3; coherent speech, normal   rate and volume, able to articulate logical thoughts, able   to abstract reason, no tangential thoughts, no hallucinations   or delusions   RESP: No cough, no audible wheezing, able to talk in full sentences  Remainder of exam unable to be completed due to telephone visits                Phone call duration: 15 minutes

## 2022-06-07 DIAGNOSIS — E03.8 OTHER SPECIFIED HYPOTHYROIDISM: ICD-10-CM

## 2022-06-07 RX ORDER — LEVOTHYROXINE SODIUM 50 UG/1
50 TABLET ORAL
Qty: 30 TABLET | Refills: 0 | Status: SHIPPED | OUTPATIENT
Start: 2022-06-07 | End: 2022-07-11

## 2022-06-07 NOTE — TELEPHONE ENCOUNTER
Patient is calling and states that yesterday she tried to  medication at [facility].  Patient could not get her medication as pharmacy sent message to clinic.  Pharmacy has questions about dose of medication.  Patient has no more refills.  Patient feels that clinic did increase the dosage but is not certain.  Please phone Maddy at [phone].     COVID 19 Nurse Triage Plan/Patient Instructions    Please be aware that novel coronavirus (COVID-19) may be circulating in the community. If you develop symptoms such as fever, cough, or SOB or if you have concerns about the presence of another infection including coronavirus (COVID-19), please contact your health care provider or visit [email or web address].     Disposition/Instructions    Home care recommended. Follow home care protocol based instructions.    Thank you for taking steps to prevent the spread of this virus.  o Limit your contact with others.  o Wear a simple mask to cover your cough.  o Wash your hands well and often.    Resources    [facility]: About COVID-19: [email or web address]    CDC: What to Do If You're Sick: www.cdc.gov/coronavirus/2019-ncov/about/steps-when-sick.html    CDC: Ending Home Isolation: www.cdc.gov/coronavirus/2019-ncov/hcp/disposition-in-home-patients.html     CDC: Caring for Someone: www.cdc.gov/coronavirus/2019-ncov/if-you-are-sick/care-for-someone.html     [facility]: Interim Guidance for Hospital Discharge to Home: www.health.[facility].[place]/diseases/coronavirus/hcp/hospdischarge.pdf    [facility] clinical trials (COVID-19 research studies): [email or web address]-trials     Below are the COVID-19 hotlines at the [facility] ([facility]). Interpreters are available.   o For health questions: Call 538-453-2835 or 1-722.683.9682 (7 a.m. to 7 p.m.)  o For questions about schools and childcare: Call [phone] or 1-769.822.2315 (7 a.m. to 7 p.m.)

## 2022-06-07 NOTE — TELEPHONE ENCOUNTER
Refilled for 1 month.  She is due to see me again in July for an A1c recheck.  We will check her thyroid at that time as well.  Shirley Freeman, CNP

## 2022-06-07 NOTE — TELEPHONE ENCOUNTER
Routing refill request to provider for review/approval because:  Labs out of range:  TSH elevated in March while inpt.  Last refilled by discharging provider in March during hospital discharge.    Elvira Quinn RN

## 2022-06-07 NOTE — TELEPHONE ENCOUNTER
"Requested Prescriptions   Pending Prescriptions Disp Refills     levothyroxine (SYNTHROID/LEVOTHROID) 50 MCG tablet 30 tablet      Sig: Take 1 tablet (50 mcg) by mouth every morning (before breakfast)       Thyroid Protocol Failed - 6/7/2022  8:39 AM        Failed - Recent (12 mo) or future (30 days) visit within the authorizing provider's specialty     Patient has had an office visit with the authorizing provider or a provider within the authorizing providers department within the previous 12 mos or has a future within next 30 days. See \"Patient Info\" tab in inbasket, or \"Choose Columns\" in Meds & Orders section of the refill encounter.              Failed - Normal TSH on file in past 12 months     Recent Labs   Lab Test 03/04/22  0731   TSH 6.22*              Passed - Patient is 12 years or older        Passed - Medication is active on med list        Passed - No active pregnancy on record     If patient is pregnant or has had a positive pregnancy test, please check TSH.          Passed - No positive pregnancy test in past 12 months     If patient is pregnant or has had a positive pregnancy test, please check TSH.               "

## 2022-06-08 ENCOUNTER — NURSE TRIAGE (OUTPATIENT)
Dept: NURSING | Facility: CLINIC | Age: 37
End: 2022-06-08
Payer: MEDICARE

## 2022-06-08 NOTE — TELEPHONE ENCOUNTER
"Red transfer for IUD came out in clot in pad, has been on period for 2 weeks, has not otherwise had a period in years. Call dropped during transfer.    Attempted to call patient. Maddy answered but said she cannot talk due to a ride picking her up, and she will call back tomorrow.    Routing FYI to PCP.    Loan Calle RN  [place] Nurse Advisor  6:16 PM  6/8/2022    COVID 19 Nurse Triage Plan/Patient Instructions    Please be aware that novel coronavirus (COVID-19) may be circulating in the community. If you develop symptoms such as fever, cough, or SOB or if you have concerns about the presence of another infection including coronavirus (COVID-19), please contact your health care provider or visit [email or web address].     Disposition/Instructions    Additional COVID19 information to add for patients.   How can I protect others?  If you have symptoms (fever, cough, body aches or trouble breathing): Stay home and away from others (self-isolate) until:    At least 10 days have passed since your symptoms started, And     You ve had no fever--and no medicine that reduces fever--for 1 full day (24 hours), And      Your other symptoms have resolved (gotten better).     If you don t have symptoms, but a test showed that you have COVID-19 (you tested positive):    Stay home and away from others (self-isolate). Follow the tips under \"How do I self-isolate?\" below for 10 days (20 days if you have a weak immune system).    You don't need to be retested for COVID-19 before going back to school or work. As long as you're fever-free and feeling better, you can go back to school, work and other activities after waiting the 10 or 20 days.     How do I self-isolate?    Stay in your own room, even for meals. Use your own bathroom if you can.     Stay away from others in your home. No hugging, kissing or shaking hands. No visitors.    Don t go to work, school or anywhere else.     Clean  high touch  surfaces often " (doorknobs, counters, handles, etc.). Use a household cleaning spray or wipes. You ll find a full list on the EPA website:  www.epa.gov/pesticide-registration/list-n-disinfectants-use-against-sars-cov-2.    Cover your mouth and nose with a mask, tissue or washcloth to avoid spreading germs.    Wash your hands and face often. Use soap and water.    Caregivers in these groups are at risk for severe illness due to COVID-19:  o People 65 years and older  o People who live in a nursing home or long-term care facility  o People with chronic disease (lung, heart, cancer, diabetes, kidney, liver, immunologic)  o People who have a weakened immune system, including those who:  - Are in cancer treatment  - Take medicine that weakens the immune system, such as corticosteroids  - Had a bone marrow or organ transplant  - Have an immune deficiency  - Have poorly controlled HIV or AIDS  - Are obese (body mass index of 40 or higher)  - Smoke regularly    Caregivers should wear gloves while washing dishes, handling laundry and cleaning bedrooms and bathrooms.    Use caution when washing and drying laundry: Don t shake dirty laundry, and use the warmest water setting that you can.    For more tips, go to www.cdc.gov/coronavirus/2019-ncov/downloads/10Things.pdf.    How can I take care of myself?  1. Get lots of rest. Drink extra fluids (unless a doctor has told you not to).     2. Take Tylenol (acetaminophen) for fever or pain. If you have liver or kidney problems, ask your family doctor if it s okay to take Tylenol.     Adults can take either:     650 mg (two 325 mg pills) every 4 to 6 hours, or     1,000 mg (two 500 mg pills) every 8 hours as needed.     Note: Don t take more than 3,000 mg in one day.   Acetaminophen is found in many medicines (both prescribed and over-the-counter medicines). Read all labels to be sure you don t take too much.     For children, check the Tylenol bottle for the right dose. The dose is based on the  child s age or weight.    3. If you have other health problems (like cancer, heart failure, an organ transplant or severe kidney disease): Call your specialty clinic if you don t feel better in the next 2 days.    4. Know when to call 911: Emergency warning signs include:    Trouble breathing or shortness of breath    Pain or pressure in the chest that doesn t go away    Feeling confused like you haven t felt before, or not being able to wake up    Bluish-colored lips or face    What are the symptoms of COVID-19?     The most common symptoms are cough, fever and trouble breathing.     Less common symptoms include body aches, chills, diarrhea (loose, watery poops), fatigue (feeling very tired), headache, runny nose, sore throat and loss of smell.    COVID-19 can cause severe coughing (bronchitis) and lung infection (pneumonia).    How does it spread?     The virus may spread when a person coughs or sneezes into the air. The virus can travel about 6 feet this way, and it can live on surfaces.      Common  (household disinfectants) will kill the virus.    Who is at risk?  Anyone can catch COVID-19 if they re around someone who has the virus.    How can others protect themselves?     Stay away from people who have COVID-19 (or symptoms of COVID-19).    Wash hands often with soap and water. Or, use hand  with at least 60% alcohol.    Avoid touching the eyes, nose or mouth.     Wear a face mask when you go out in public, when sick or when caring for a sick person.    Where can I get more information?    [facility]: About COVID-19: [email or web address]    CDC: What to Do If You re Sick: www.cdc.gov/coronavirus/2019-ncov/about/steps-when-sick.html    CDC: Ending Home Isolation: www.cdc.gov/coronavirus/2019-ncov/hcp/disposition-in-home-patients.html     CDC: Caring for Someone: www.cdc.gov/coronavirus/2019-ncov/if-you-are-sick/care-for-someone.html     [facility]: Interim Guidance for [facility]  Discharge to Home: www.[facility]./diseases/coronavirus/hcp/hospdischarge.pdf    [facility] clinical trials (COVID-19 research studies): clinicalaffairs.[facility]-clinical-trials     Below are the COVID-19 hotlines at the [place] Department of Health ([facility]). Interpreters are available.   o For health questions: Call 138-888-9923 or 1-563.566.2934 (7 a.m. to 7 p.m.)  o For questions about schools and childcare: Call 200-985-7086 or 1-339.815.2977 (7 a.m. to 7 p.m.)          Thank you for taking steps to prevent the spread of this virus.  o Limit your contact with others.  o Wear a simple mask to cover your cough.  o Wash your hands well and often.    Resources    [facility]: About COVID-19: [email or web address]    CDC: What to Do If You're Sick: www.cdc.gov/coronavirus/2019-ncov/about/steps-when-sick.html    CDC: Ending Home Isolation: www.cdc.gov/coronavirus/2019-ncov/hcp/disposition-in-home-patients.html     CDC: Caring for Someone: www.cdc.gov/coronavirus/2019-ncov/if-you-are-sick/care-for-someone.html     [facility]: Interim Guidance for Hospital Discharge to Home: www.[facility]./diseases/coronavirus/hcp/hospdischarge.pdf    [facility] clinical trials (COVID-19 research studies): clinicalaffairs.[facility]-clinical-trials     Below are the COVID-19 hotlines at the [place] Department of Health ([facility]). Interpreters are available.   o For health questions: Call 638-569-7420 or 1-618.832.6359 (7 a.m. to 7 p.m.)  o For questions about schools and childcare: Call 351-411-2550 or 1-755.902.1220 (7 a.m. to 7 p.m.)

## 2022-06-09 ENCOUNTER — VIRTUAL VISIT (OUTPATIENT)
Dept: SURGERY | Facility: CLINIC | Age: 37
End: 2022-06-09
Payer: MEDICARE

## 2022-06-09 ENCOUNTER — HOSPITAL ENCOUNTER (OUTPATIENT)
Dept: SPEECH THERAPY | Facility: CLINIC | Age: 37
Setting detail: THERAPIES SERIES
Discharge: HOME | End: 2022-06-09
Attending: NURSE PRACTITIONER
Payer: MEDICARE

## 2022-06-09 VITALS — BODY MASS INDEX: 43.59 KG/M2 | WEIGHT: 246 LBS | HEIGHT: 63 IN

## 2022-06-09 DIAGNOSIS — E11.9 TYPE 2 DIABETES MELLITUS WITHOUT COMPLICATION, WITHOUT LONG-TERM CURRENT USE OF INSULIN (H): Primary | ICD-10-CM

## 2022-06-09 DIAGNOSIS — E66.01 OBESITY, CLASS III, BMI 40-49.9 (MORBID OBESITY) (H): ICD-10-CM

## 2022-06-09 DIAGNOSIS — R47.9 SPEECH PROBLEM: ICD-10-CM

## 2022-06-09 PROCEDURE — 97803 MED NUTRITION INDIV SUBSEQ: CPT | Mod: 95 | Performed by: DIETITIAN, REGISTERED

## 2022-06-09 PROCEDURE — 96125 COGNITIVE TEST BY HC PRO: CPT | Mod: GN | Performed by: SPEECH-LANGUAGE PATHOLOGIST

## 2022-06-09 NOTE — LETTER
6/9/2022         RE: Maddy Ortiz  [address]  [place] [address]        Dear Colleague,    Thank you for referring your patient, Maddy Ortiz, to the [facility]ATRICS CARE [place]. Please see a copy of my visit note below.    Maddy Ortiz is a 36 year old who is being evaluated via a billable video visit.      How would you like to obtain your AVS? MyChart  If the video visit is dropped, the invitation should be resent by: Send to e-mail at: [email or web address]  Will anyone else be joining your video visit? No      Video Start Time: 9:39 AM      Medical  Weight Loss Follow-Up Diet Evaluation  Assessment:  Maddy is presenting today for a follow up weight management nutrition consultation. Pt has had an initial appointment with Dr. Serna  Weight loss medication: victoza.   Pt's weight is 246 lbs 0 oz  Initial weight: 202lb  Weight change: up 44lb from initial visit- starting to see weight come down a bit from highest of 253lb    Changes and Difficulties 5/28/2022   I have made the following changes to my diet since my last visit: meal planning   With regards to my diet, I am still struggling with: -   I have made the following changes to my activity/exercise since my last visit: active physical activity   With regards to my activity/exercise, I am still struggling with: rest - tend to work hard 1 week rest the next week     BMI: Body mass index is 43.58 kg/m .  Ideal body weight: 52.4 kg (115 lb 8.3 oz)  Adjusted ideal body weight: 76.1 kg (167 lb 11.4 oz)    Estimated RMR ([place]-St Jeor equation):   1800 kcals x 1.2 (sedentary) = 2100 kcals (for weight maintenance)  Recommended Protein Intake: 60-80 grams of protein/day  Patient Active Problem List:  Patient Active Problem List   Diagnosis     Animal dander allergy     CARDIOVASCULAR SCREENING; LDL GOAL LESS THAN 160     Psychosis (H)     Paranoid type delusional disorder (H)     Bipolar 1 disorder (H)      Insomnia     Depression with anxiety     Seasonal allergic rhinitis due to pollen     Allergic rhinitis due to mold     Allergic rhinitis due to animal dander     Allergic rhinitis due to dust mite     Encounter for IUD insertion     Schizoaffective disorder, bipolar type (H)     Gastroesophageal reflux disease without esophagitis     Paranoia (psychosis) (H)     Morbid obesity (H)     Schizoaffective disorder (H)     Umbilical hernia without obstruction and without gangrene     Fatty liver     Diabetes mellitus, type 2 (H)     Elevated LFTs     Disorganized behavior     Infection due to 2019 novel coronavirus     Polypharmacy     Sedated due to multiple medications     Diabetes: blood glucose is stable- taking victoza, ranging from     Progress on goals from last visit: states she is doing a lot of cleaning, medications are working and everything is going really well  -really working on budget, has been going to the food shelf- eating a lot of soups, more mindful of eating out less  Goals:  1. Eat three meals per day- goal met for the most part    Dietary Recall:  Breakfast: bagel thin with light cream cheese, orange juice  Lunch: small snack- drank a lot of apple juice  Dinner: really big but didn't finish everything, green beans, 1/3 of a hamburger, shrimp scampi pasta  Beverages: diet soda occasionally, juice at breakfast, states she has been drinking a lot of water  Exercise: biking    Nutrition Diagnosis:  Overweight/Obesity (NC 3.3) related to overeating and poor lifestyle habits as evidenced by patient's report of inconsistent meals, large portions, frequent snacking of sweets, lack of activity and BMI 43.58      Intervention:  1. Food and/or nutrient delivery: encouraged consistency with diet- planning ahead for dinners to promote nutritional balance, bulk up mid-day meal with protein  2. Nutrition counseling: motivational interviewing- goal setting    Monitoring/Evaluation:    Goals:  1. Work on  meal planning for dinner meal    Patient to follow up in 3 month(s) with bariatrician and 3 month(s) with ELVIA      Video-Visit Details    Type of service:  Video Visit    Video End Time:10:00a    Originating Location (pt. Location): Home    Distant Location (provider location):  [facility] SURGERY CL[facility]IC [facility]ATRICS CARE [place]     Platform used for Video Visit: Perri FAULKNER RD        Again, thank you for allowing me to participate in the care of your patient.        Sincerely,        KARLA FAULKNER RD

## 2022-06-09 NOTE — PROGRESS NOTES
Maddy Ortiz is a 36 year old who is being evaluated via a billable video visit.      How would you like to obtain your AVS? MyChart  If the video visit is dropped, the invitation should be resent by: Send to e-mail at: [email or web address]  Will anyone else be joining your video visit? No      Video Start Time: 9:39 AM      Medical  Weight Loss Follow-Up Diet Evaluation  Assessment:  Maddy is presenting today for a follow up weight management nutrition consultation. Pt has had an initial appointment with Dr. Serna  Weight loss medication: victoza.   Pt's weight is 246 lbs 0 oz  Initial weight: 202lb  Weight change: up 44lb from initial visit- starting to see weight come down a bit from highest of 253lb    Changes and Difficulties 5/28/2022   I have made the following changes to my diet since my last visit: meal planning   With regards to my diet, I am still struggling with: -   I have made the following changes to my activity/exercise since my last visit: active physical activity   With regards to my activity/exercise, I am still struggling with: rest - tend to work hard 1 week rest the next week     BMI: Body mass index is 43.58 kg/m .  Ideal body weight: 52.4 kg (115 lb 8.3 oz)  Adjusted ideal body weight: 76.1 kg (167 lb 11.4 oz)    Estimated RMR ([place]-St Jeor equation):   1800 kcals x 1.2 (sedentary) = 2100 kcals (for weight maintenance)  Recommended Protein Intake: 60-80 grams of protein/day  Patient Active Problem List:  Patient Active Problem List   Diagnosis     Animal dander allergy     CARDIOVASCULAR SCREENING; LDL GOAL LESS THAN 160     Psychosis (H)     Paranoid type delusional disorder (H)     Bipolar 1 disorder (H)     Insomnia     Depression with anxiety     Seasonal allergic rhinitis due to pollen     Allergic rhinitis due to mold     Allergic rhinitis due to animal dander     Allergic rhinitis due to dust mite     Encounter for IUD insertion     Schizoaffective disorder, bipolar type (H)      Gastroesophageal reflux disease without esophagitis     Paranoia (psychosis) (H)     Morbid obesity (H)     Schizoaffective disorder (H)     Umbilical hernia without obstruction and without gangrene     Fatty liver     Diabetes mellitus, type 2 (H)     Elevated LFTs     Disorganized behavior     Infection due to 2019 novel coronavirus     Polypharmacy     Sedated due to multiple medications     Diabetes: blood glucose is stable- taking victoza, ranging from     Progress on goals from last visit: states she is doing a lot of cleaning, medications are working and everything is going really well  -really working on budget, has been going to the food shelf- eating a lot of soups, more mindful of eating out less  Goals:  1. Eat three meals per day- goal met for the most part    Dietary Recall:  Breakfast: bagel thin with light cream cheese, orange juice  Lunch: small snack- drank a lot of apple juice  Dinner: really big but didn't finish everything, green beans, 1/3 of a hamburger, shrimp scampi pasta  Beverages: diet soda occasionally, juice at breakfast, states she has been drinking a lot of water  Exercise: biking    Nutrition Diagnosis:  Overweight/Obesity (NC 3.3) related to overeating and poor lifestyle habits as evidenced by patient's report of inconsistent meals, large portions, frequent snacking of sweets, lack of activity and BMI 43.58      Intervention:  1. Food and/or nutrient delivery: encouraged consistency with diet- planning ahead for dinners to promote nutritional balance, bulk up mid-day meal with protein  2. Nutrition counseling: motivational interviewing- goal setting    Monitoring/Evaluation:    Goals:  1. Work on meal planning for dinner meal    Patient to follow up in 3 month(s) with bariatrician and 3 month(s) with RD      Video-Visit Details    Type of service:  Video Visit    Video End Time:10:00a    Originating Location (pt. Location): Home    Distant Location (provider location):  [facility]  [facility] SURGERY CLINIC AND BARIATRICS CARE [place]     Platform used for Video Visit: Perri FAULKNER RD

## 2022-06-10 ENCOUNTER — TELEPHONE (OUTPATIENT)
Dept: NURSING | Facility: CLINIC | Age: 37
End: 2022-06-10
Payer: MEDICARE

## 2022-06-10 ENCOUNTER — TELEPHONE (OUTPATIENT)
Dept: FAMILY MEDICINE | Facility: CLINIC | Age: 37
End: 2022-06-10
Payer: MEDICARE

## 2022-06-10 NOTE — TELEPHONE ENCOUNTER
Shirley Freeman APRN CNP routed conversation to [facility] Provider Careteam Pool 56 minutes ago (8:30 AM)     Shirley Freeman APRN CNP 57 minutes ago (8:29 AM)     MERLIN       RN please call patient.  Spontaneous IUD expulsion is uncommon, but certainly can happen.  Educate patient that she may have some cramping and bleeding, but otherwise will be fine.  She should make an appointment with her OB/GYN (who placed the IUD).  Shirley Freeman CNP            Documentation      Loan Calle RN routed conversation to Shirley Freeman APRN CNP 2 days ago     Loan Calle RN 2 days ago     MK             []Hide copied text    []Hover for details    Red transfer for IUD came out in clot in pad, has been on period for 2 weeks, has not otherwise had a period in years. Call dropped during transfer.     Attempted to call patient. Maddy answered but said she cannot talk due to a ride picking her up, and she will call back tomorrow.     Routing FYI to PCP.     Loan Calle RN  [place] Nurse Advisor  6:16 PM  6/8/2022

## 2022-06-10 NOTE — TELEPHONE ENCOUNTER
Pt calling because her IUD fell out.    Says the Cathy fell out on 6/8 (she thinks) while she was wiping and was embedded in a blood clot.    Would like to see a doctor to have another one placed.    Transferred to scheduling to make appt with OB/GYN.    Yaima Patricio, RN, BSN  [facility]   Triage Nurse Advisor

## 2022-06-10 NOTE — TELEPHONE ENCOUNTER
Patient reached and told of an IUD coming out spontaneously is uncommon but can happen.  Will or could have bleeding and cramping but should clear up and go away.  Watch for increased pain, foul odor and or temperature.  Be seen then.  Make appt with OB/ GYN for consult on contraceptive options. Nicci PONCE RN

## 2022-06-10 NOTE — TELEPHONE ENCOUNTER
RN please call patient.  Spontaneous IUD expulsion is uncommon, but certainly can happen.  Educate patient that she may have some cramping and bleeding, but otherwise will be fine.  She should make an appointment with her OB/GYN (who placed the IUD).  Shirley Freeman, CNP

## 2022-06-13 ENCOUNTER — NURSE TRIAGE (OUTPATIENT)
Dept: NURSING | Facility: CLINIC | Age: 37
End: 2022-06-13
Payer: MEDICARE
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
## 2022-06-17 ENCOUNTER — HOSPITAL ENCOUNTER (INPATIENT)
Facility: CLINIC | Age: 37
LOS: 4 days | Discharge: HOME | DRG: 918 | End: 2022-06-22
Attending: EMERGENCY MEDICINE | Admitting: INTERNAL MEDICINE
Payer: MEDICARE

## 2022-06-17 ENCOUNTER — TELEPHONE (OUTPATIENT)
Dept: BEHAVIORAL HEALTH | Facility: CLINIC | Age: 37
End: 2022-06-17
Payer: MEDICARE

## 2022-06-17 DIAGNOSIS — F25.0 SCHIZOAFFECTIVE DISORDER, BIPOLAR TYPE (H): ICD-10-CM

## 2022-06-17 DIAGNOSIS — T43.594A: ICD-10-CM

## 2022-06-17 DIAGNOSIS — F25.9 SCHIZOAFFECTIVE DISORDER, UNSPECIFIED TYPE (H): Primary | ICD-10-CM

## 2022-06-17 DIAGNOSIS — Z20.822 CONTACT WITH AND (SUSPECTED) EXPOSURE TO COVID-19: ICD-10-CM

## 2022-06-17 DIAGNOSIS — T50.904A OVERDOSE, UNDETERMINED INTENT, INITIAL ENCOUNTER: ICD-10-CM

## 2022-06-17 PROCEDURE — 80053 COMPREHEN METABOLIC PANEL: CPT | Performed by: EMERGENCY MEDICINE

## 2022-06-17 PROCEDURE — 99285 EMERGENCY DEPT VISIT HI MDM: CPT | Mod: 25 | Performed by: EMERGENCY MEDICINE

## 2022-06-17 PROCEDURE — 80179 DRUG ASSAY SALICYLATE: CPT | Performed by: EMERGENCY MEDICINE

## 2022-06-17 PROCEDURE — 80178 ASSAY OF LITHIUM: CPT | Performed by: EMERGENCY MEDICINE

## 2022-06-17 PROCEDURE — 80143 DRUG ASSAY ACETAMINOPHEN: CPT | Performed by: EMERGENCY MEDICINE

## 2022-06-17 PROCEDURE — 93005 ELECTROCARDIOGRAM TRACING: CPT | Performed by: EMERGENCY MEDICINE

## 2022-06-17 PROCEDURE — 84439 ASSAY OF FREE THYROXINE: CPT | Performed by: EMERGENCY MEDICINE

## 2022-06-17 PROCEDURE — 85025 COMPLETE CBC W/AUTO DIFF WBC: CPT | Performed by: EMERGENCY MEDICINE

## 2022-06-17 PROCEDURE — C9803 HOPD COVID-19 SPEC COLLECT: HCPCS | Performed by: EMERGENCY MEDICINE

## 2022-06-17 PROCEDURE — 84443 ASSAY THYROID STIM HORMONE: CPT | Performed by: EMERGENCY MEDICINE

## 2022-06-17 PROCEDURE — 93010 ELECTROCARDIOGRAM REPORT: CPT | Performed by: EMERGENCY MEDICINE

## 2022-06-17 PROCEDURE — 36415 COLL VENOUS BLD VENIPUNCTURE: CPT | Performed by: EMERGENCY MEDICINE

## 2022-06-17 RX ORDER — SODIUM CHLORIDE 9 MG/ML
INJECTION, SOLUTION INTRAVENOUS CONTINUOUS
Status: DISCONTINUED | OUTPATIENT
Start: 2022-06-18 | End: 2022-06-19

## 2022-06-17 ASSESSMENT — ENCOUNTER SYMPTOMS
NERVOUS/ANXIOUS: 1
DYSPHORIC MOOD: 1

## 2022-06-18 PROBLEM — T50.904A OVERDOSE, UNDETERMINED INTENT, INITIAL ENCOUNTER: Status: ACTIVE | Noted: 2022-06-18

## 2022-06-18 LAB
ALBUMIN SERPL-MCNC: 3.8 G/DL (ref 3.4–5)
ALP SERPL-CCNC: 90 U/L (ref 40–150)
ALT SERPL W P-5'-P-CCNC: 85 U/L (ref 0–50)
AMPHETAMINES UR QL SCN: NORMAL
ANION GAP SERPL CALCULATED.3IONS-SCNC: 8 MMOL/L (ref 3–14)
APAP SERPL-MCNC: <2 MG/L (ref 10–30)
AST SERPL W P-5'-P-CCNC: 51 U/L (ref 0–45)
ATRIAL RATE - MUSE: 96 BPM
BARBITURATES UR QL: NORMAL
BASOPHILS # BLD AUTO: 0.1 10E3/UL (ref 0–0.2)
BASOPHILS NFR BLD AUTO: 1 %
BENZODIAZ UR QL: NORMAL
BILIRUB SERPL-MCNC: 0.4 MG/DL (ref 0.2–1.3)
BUN SERPL-MCNC: 7 MG/DL (ref 7–30)
CALCIUM SERPL-MCNC: 9.5 MG/DL (ref 8.5–10.1)
CANNABINOIDS UR QL SCN: NORMAL
CHLORIDE BLD-SCNC: 109 MMOL/L (ref 94–109)
CO2 SERPL-SCNC: 22 MMOL/L (ref 20–32)
COCAINE UR QL: NORMAL
CREAT SERPL-MCNC: 0.62 MG/DL (ref 0.52–1.04)
DIASTOLIC BLOOD PRESSURE - MUSE: NORMAL MMHG
EOSINOPHIL # BLD AUTO: 0.4 10E3/UL (ref 0–0.7)
EOSINOPHIL NFR BLD AUTO: 4 %
ERYTHROCYTE [DISTWIDTH] IN BLOOD BY AUTOMATED COUNT: 13 % (ref 10–15)
GFR SERPL CREATININE-BSD FRML MDRD: >90 ML/MIN/1.73M2
GLUCOSE BLD-MCNC: 186 MG/DL (ref 70–99)
GLUCOSE BLDC GLUCOMTR-MCNC: 119 MG/DL (ref 70–99)
GLUCOSE BLDC GLUCOMTR-MCNC: 129 MG/DL (ref 70–99)
GLUCOSE BLDC GLUCOMTR-MCNC: 131 MG/DL (ref 70–99)
HCG UR QL: NEGATIVE
HCT VFR BLD AUTO: 34.5 % (ref 35–47)
HGB BLD-MCNC: 11.2 G/DL (ref 11.7–15.7)
IMM GRANULOCYTES # BLD: 0.1 10E3/UL
IMM GRANULOCYTES NFR BLD: 1 %
INTERPRETATION ECG - MUSE: NORMAL
LITHIUM SERPL-SCNC: 1.1 MMOL/L
LITHIUM SERPL-SCNC: 1.2 MMOL/L
LITHIUM SERPL-SCNC: 1.5 MMOL/L
LYMPHOCYTES # BLD AUTO: 1.9 10E3/UL (ref 0.8–5.3)
LYMPHOCYTES NFR BLD AUTO: 18 %
MCH RBC QN AUTO: 30.2 PG (ref 26.5–33)
MCHC RBC AUTO-ENTMCNC: 32.5 G/DL (ref 31.5–36.5)
MCV RBC AUTO: 93 FL (ref 78–100)
MONOCYTES # BLD AUTO: 0.7 10E3/UL (ref 0–1.3)
MONOCYTES NFR BLD AUTO: 7 %
NEUTROPHILS # BLD AUTO: 7.2 10E3/UL (ref 1.6–8.3)
NEUTROPHILS NFR BLD AUTO: 69 %
NRBC # BLD AUTO: 0 10E3/UL
NRBC BLD AUTO-RTO: 0 /100
OPIATES UR QL SCN: NORMAL
P AXIS - MUSE: 65 DEGREES
PCP UR QL SCN: NORMAL
PLATELET # BLD AUTO: 151 10E3/UL (ref 150–450)
POTASSIUM BLD-SCNC: 3.8 MMOL/L (ref 3.4–5.3)
PR INTERVAL - MUSE: 176 MS
PROT SERPL-MCNC: 7.5 G/DL (ref 6.8–8.8)
QRS DURATION - MUSE: 84 MS
QT - MUSE: 356 MS
QTC - MUSE: 449 MS
R AXIS - MUSE: 68 DEGREES
RBC # BLD AUTO: 3.71 10E6/UL (ref 3.8–5.2)
SALICYLATES SERPL-MCNC: <2 MG/DL
SARS-COV-2 RNA RESP QL NAA+PROBE: NEGATIVE
SODIUM SERPL-SCNC: 139 MMOL/L (ref 133–144)
SYSTOLIC BLOOD PRESSURE - MUSE: NORMAL MMHG
T AXIS - MUSE: 47 DEGREES
T4 FREE SERPL-MCNC: 1.08 NG/DL (ref 0.76–1.46)
TSH SERPL DL<=0.005 MIU/L-ACNC: 9.74 MU/L (ref 0.4–4)
VENTRICULAR RATE- MUSE: 96 BPM
WBC # BLD AUTO: 10.3 10E3/UL (ref 4–11)

## 2022-06-18 PROCEDURE — 80178 ASSAY OF LITHIUM: CPT | Performed by: EMERGENCY MEDICINE

## 2022-06-18 PROCEDURE — 80307 DRUG TEST PRSMV CHEM ANLYZR: CPT | Performed by: PHYSICIAN ASSISTANT

## 2022-06-18 PROCEDURE — 80178 ASSAY OF LITHIUM: CPT | Performed by: INTERNAL MEDICINE

## 2022-06-18 PROCEDURE — 36415 COLL VENOUS BLD VENIPUNCTURE: CPT | Performed by: INTERNAL MEDICINE

## 2022-06-18 PROCEDURE — 99233 SBSQ HOSP IP/OBS HIGH 50: CPT | Performed by: INTERNAL MEDICINE

## 2022-06-18 PROCEDURE — 258N000003 HC RX IP 258 OP 636: Performed by: INTERNAL MEDICINE

## 2022-06-18 PROCEDURE — 258N000003 HC RX IP 258 OP 636: Performed by: EMERGENCY MEDICINE

## 2022-06-18 PROCEDURE — 250N000013 HC RX MED GY IP 250 OP 250 PS 637: Performed by: INTERNAL MEDICINE

## 2022-06-18 PROCEDURE — 96360 HYDRATION IV INFUSION INIT: CPT | Performed by: EMERGENCY MEDICINE

## 2022-06-18 PROCEDURE — 81025 URINE PREGNANCY TEST: CPT | Performed by: INTERNAL MEDICINE

## 2022-06-18 PROCEDURE — 120N000002 HC R&B MED SURG/OB UMMC

## 2022-06-18 PROCEDURE — 250N000009 HC RX 250: Performed by: INTERNAL MEDICINE

## 2022-06-18 PROCEDURE — 87635 SARS-COV-2 COVID-19 AMP PRB: CPT | Performed by: EMERGENCY MEDICINE

## 2022-06-18 PROCEDURE — 36415 COLL VENOUS BLD VENIPUNCTURE: CPT | Performed by: EMERGENCY MEDICINE

## 2022-06-18 RX ORDER — TRAZODONE HYDROCHLORIDE 50 MG/1
50 TABLET, FILM COATED ORAL
Status: DISCONTINUED | OUTPATIENT
Start: 2022-06-18 | End: 2022-06-20

## 2022-06-18 RX ORDER — ALBUTEROL SULFATE 90 UG/1
2 AEROSOL, METERED RESPIRATORY (INHALATION) EVERY 4 HOURS PRN
Status: DISCONTINUED | OUTPATIENT
Start: 2022-06-18 | End: 2022-06-22 | Stop reason: HOSPADM

## 2022-06-18 RX ORDER — DEXTROSE MONOHYDRATE 25 G/50ML
25-50 INJECTION, SOLUTION INTRAVENOUS
Status: DISCONTINUED | OUTPATIENT
Start: 2022-06-18 | End: 2022-06-22 | Stop reason: HOSPADM

## 2022-06-18 RX ORDER — NICOTINE POLACRILEX 4 MG
15-30 LOZENGE BUCCAL
Status: DISCONTINUED | OUTPATIENT
Start: 2022-06-18 | End: 2022-06-22 | Stop reason: HOSPADM

## 2022-06-18 RX ORDER — ACETAMINOPHEN 325 MG/1
650 TABLET ORAL EVERY 4 HOURS PRN
Status: DISCONTINUED | OUTPATIENT
Start: 2022-06-18 | End: 2022-06-22 | Stop reason: HOSPADM

## 2022-06-18 RX ORDER — OLANZAPINE 5 MG/1
5 TABLET ORAL AT BEDTIME
COMMUNITY
End: 2022-08-25 | Stop reason: DRUGHIGH

## 2022-06-18 RX ORDER — AMMONIUM LACTATE 12 G/100G
CREAM TOPICAL 2 TIMES DAILY PRN
Status: DISCONTINUED | OUTPATIENT
Start: 2022-06-18 | End: 2022-06-22 | Stop reason: HOSPADM

## 2022-06-18 RX ORDER — OLANZAPINE 5 MG/1
5 TABLET ORAL AT BEDTIME
Status: DISCONTINUED | OUTPATIENT
Start: 2022-06-18 | End: 2022-06-22 | Stop reason: HOSPADM

## 2022-06-18 RX ORDER — CETIRIZINE HYDROCHLORIDE 10 MG/1
10-20 TABLET ORAL
Status: DISCONTINUED | OUTPATIENT
Start: 2022-06-18 | End: 2022-06-22 | Stop reason: HOSPADM

## 2022-06-18 RX ORDER — RISPERIDONE 50 MG/2 ML
50 KIT INTRAMUSCULAR
Status: ON HOLD | COMMUNITY
End: 2022-06-22

## 2022-06-18 RX ORDER — LIRAGLUTIDE 6 MG/ML
1.2 INJECTION SUBCUTANEOUS DAILY
Status: DISCONTINUED | OUTPATIENT
Start: 2022-06-18 | End: 2022-06-22 | Stop reason: HOSPADM

## 2022-06-18 RX ORDER — LIDOCAINE 40 MG/G
CREAM TOPICAL
Status: DISCONTINUED | OUTPATIENT
Start: 2022-06-18 | End: 2022-06-22 | Stop reason: HOSPADM

## 2022-06-18 RX ORDER — FLUTICASONE PROPIONATE 50 MCG
2 SPRAY, SUSPENSION (ML) NASAL DAILY
Status: DISCONTINUED | OUTPATIENT
Start: 2022-06-18 | End: 2022-06-22 | Stop reason: HOSPADM

## 2022-06-18 RX ORDER — LEVOTHYROXINE SODIUM 50 UG/1
50 TABLET ORAL
Status: DISCONTINUED | OUTPATIENT
Start: 2022-06-18 | End: 2022-06-22 | Stop reason: HOSPADM

## 2022-06-18 RX ORDER — GABAPENTIN 300 MG/1
300 CAPSULE ORAL 3 TIMES DAILY
Status: DISCONTINUED | OUTPATIENT
Start: 2022-06-18 | End: 2022-06-22 | Stop reason: HOSPADM

## 2022-06-18 RX ADMIN — FLUTICASONE FUROATE AND VILANTEROL TRIFENATATE 1 PUFF: 100; 25 POWDER RESPIRATORY (INHALATION) at 08:43

## 2022-06-18 RX ADMIN — LIRAGLUTIDE 1.2 MG: 6 INJECTION SUBCUTANEOUS at 09:00

## 2022-06-18 RX ADMIN — GABAPENTIN 300 MG: 300 CAPSULE ORAL at 13:27

## 2022-06-18 RX ADMIN — GABAPENTIN 300 MG: 300 CAPSULE ORAL at 20:46

## 2022-06-18 RX ADMIN — GABAPENTIN 300 MG: 300 CAPSULE ORAL at 09:00

## 2022-06-18 RX ADMIN — Medication 1 MG: at 21:29

## 2022-06-18 RX ADMIN — SODIUM CHLORIDE, PRESERVATIVE FREE: 5 INJECTION INTRAVENOUS at 21:40

## 2022-06-18 RX ADMIN — LEVOTHYROXINE SODIUM 50 MCG: 50 TABLET ORAL at 08:38

## 2022-06-18 RX ADMIN — SODIUM CHLORIDE 1000 ML: 9 INJECTION, SOLUTION INTRAVENOUS at 00:04

## 2022-06-18 RX ADMIN — FLUTICASONE PROPIONATE 2 SPRAY: 50 SPRAY, METERED NASAL at 08:45

## 2022-06-18 RX ADMIN — OLANZAPINE 5 MG: 5 TABLET, FILM COATED ORAL at 21:29

## 2022-06-18 RX ADMIN — SODIUM CHLORIDE, PRESERVATIVE FREE: 5 INJECTION INTRAVENOUS at 13:08

## 2022-06-18 ASSESSMENT — ACTIVITIES OF DAILY LIVING (ADL)
TOILETING: 0-->INDEPENDENT
TRANSFERRING: 0-->INDEPENDENT
TOILETING_ISSUES: YES
DIFFICULTY_EATING/SWALLOWING: NO
VISION_MANAGEMENT: GLASSES
ADLS_ACUITY_SCORE: 35
TRANSFERRING: 0-->INDEPENDENT
ADLS_ACUITY_SCORE: 35
CONCENTRATING,_REMEMBERING_OR_MAKING_DECISIONS_DIFFICULTY: YES
WEAR_GLASSES_OR_BLIND: YES
ADLS_ACUITY_SCORE: 35
VISION_MANAGEMENT: GLASSES
CONCENTRATING,_REMEMBERING_OR_MAKING_DECISIONS_DIFFICULTY: YES
ADLS_ACUITY_SCORE: 35
FALL_HISTORY_WITHIN_LAST_SIX_MONTHS: NO
ADLS_ACUITY_SCORE: 21
ADLS_ACUITY_SCORE: 21
DOING_ERRANDS_INDEPENDENTLY_DIFFICULTY: NO
WALKING_OR_CLIMBING_STAIRS_DIFFICULTY: NO
DRESSING/BATHING_DIFFICULTY: OTHER (SEE COMMENTS)
ADLS_ACUITY_SCORE: 35
ADLS_ACUITY_SCORE: 35
ADLS_ACUITY_SCORE: 21
DIFFICULTY_EATING/SWALLOWING: NO
TOILETING: 0-->INDEPENDENT
WEAR_GLASSES_OR_BLIND: YES
CHANGE_IN_FUNCTIONAL_STATUS_SINCE_ONSET_OF_CURRENT_ILLNESS/INJURY: NO

## 2022-06-18 NOTE — PHARMACY-ADMISSION MEDICATION HISTORY
Admission Medication History Completed by Pharmacy    See [facility] Admission Navigator for allergy information, preferred outpatient pharmacy, prior to admission medications and immunization status.     Medication History Sources:     Patient    Pharmacy fill history via [facility]    Changes made to PTA medication list (reason):    Added: None    Deleted: Cathy IUD (per pt not in place anymore)    Changed:   o Gabapentin 300 mg daily + 600 mg hs --> 300 mg tid (per pt, fill history)  o Lithium  mg bid --> 1350 mg hs (per pt, fill history)  o Olanzapine 15 mg in the evening --> 5 mg in the evening (per pt, fill history, pt has not started taking yet)  o risperdal consta 37.5 mg IM every 14 days --> 50 mg IM every 14 days (per pt, fill history)    Additional Information:    Risperdal Consta last given Thursday 6/16/22 per pt, this was first time receiving the 50 mg dose    Pt has not yet started the olanzapine at home    Pt will sometimes take supplements including fiber but hasn't recently due to depression    Prior to Admission medications    Medication Sig Last Dose Taking? Auth Provider Long Term End Date   acetaminophen (TYLENOL) 325 MG tablet Take 2 tablets (650 mg) by mouth every 4 hours as needed for mild pain (to moderate pain) 6/17/2022 at 2043 Yes Mary Reeves APRN CNP     albuterol (PROAIR HFA/PROVENTIL HFA/VENTOLIN HFA) 108 (90 Base) MCG/ACT inhaler Inhale 2 puffs into the lungs every 4 hours as needed for wheezing or shortness of breath / dyspnea Past Week at Unknown time Yes Mary Reeves APRN CNP Yes    ammonium lactate (LAC-HYDRIN) 12 % external cream Apply topically 2 times daily as needed for dry skin  Yes Rachid Garza DPM     azelastine (ASTELIN) 0.1 % nasal spray Spray 2 sprays into both nostrils 2 times daily as needed for rhinitis 6/16/2022 at Unknown time Yes Rudy Shin MD     budesonide-formoterol (SYMBICORT) 80-4.5 MCG/ACT Inhaler Inhale 2 puffs into  the lungs 2 times daily  Yes Rudy Shin MD Yes    cetirizine (ZYRTEC) 10 MG tablet Take 1-2 tablets (10-20 mg) by mouth nightly as needed for allergies or rhinitis 6/16/2022 at Unknown time Yes Rudy Shin MD     EPINEPHrine (ANY BX GENERIC EQUIV) 0.3 MG/0.3ML injection 2-pack Inject 0.3 mg into the muscle as needed for anaphylaxis  Yes Unknown, Entered By History     fluticasone (FLONASE) 50 MCG/ACT nasal spray Spray 2 sprays into both nostrils daily  Yes Mary Reeves APRN CNP     gabapentin (NEURONTIN) 300 MG capsule Take 300 mg in  in afternoon and 600 mg at night.  Patient taking differently: Take 300 mg by mouth 3 times daily 6/17/2022 at Unknown time Yes Krishna Olivia MD Yes    levothyroxine (SYNTHROID/LEVOTHROID) 50 MCG tablet Take 1 tablet (50 mcg) by mouth every morning (before breakfast) 6/17/2022 at Unknown time Yes Shirley Freeman APRN CNP Yes    liraglutide (VICTOZA) 18 MG/3ML solution Inject 1.2 mg Subcutaneous daily Inject 0.6mg daily x 7 days, then increase to 1.2mg daily if tolerating) 6/17/2022 at Unknown time Yes Shirley Freeman APRN CNP Yes    lithium ER (ESKALITH CR/LITHOBID) 450 MG CR tablet Take 1 tablet (450 mg) by mouth every evening along with one 300 mg tablet for 750 mg total.  Patient taking differently: Take 1,350 mg by mouth At Bedtime 6/17/2022 at Unknown time Yes Mary Reeves APRN CNP Yes    Melatonin 10 MG TABS tablet Take 1 tablet (10 mg) by mouth nightly as needed for sleep More than a month at Unknown time Yes Mary Reeves APRN CNP     mineral oil-hydrophilic petrolatum (AQUAPHOR) external ointment Apply topically daily as needed for dry skin (apply to dry skin and rash around umbilical hernia)  Yes Reported, Patient     risperiDONE microspheres ER (RISPERDAL CONSTA) 50 MG injection Inject 50 mg into the muscle every 14 days 6/16/2022 Yes Unknown, Entered By History No    terbinafine (LAMISIL AT) 1 % external cream Apply  topically 2 times daily as needed (rash/itching.  Apply to feet.)  Yes Mary Reeves APRN CNP     traZODone (DESYREL) 50 MG tablet TAKE 1-2 TABLETS BY MOUTH DAILY AT BEDTIME AS NEEDED FOR SLEEP  Yes Reported, Patient No    blood glucose (NO BRAND SPECIFIED) test strip Use to test blood sugar 6 times daily or as directed.  Patient not taking: Reported on 6/1/2022   Shirley Freeman APRN CNP     insulin pen needle (32G X 4 MM) 32G X 4 MM miscellaneous Use 1 pen needles daily with Victoza  Patient not taking: Reported on 6/1/2022   Shirley Freeman APRN CNP     OLANZapine (ZYPREXA) 5 MG tablet Take 5 mg by mouth At Bedtime not started  Unknown, Entered By History No        Date completed: 06/18/22    Medication history completed by: Lori Dow [facility]

## 2022-06-18 NOTE — TELEPHONE ENCOUNTER
R: 10:50PM Intake received call from Charlene from [facility] with collateral information.  Pt has a Dx Hx of Bipolar.  Pt has been frequently contacting the crisis line with disorganized speech and paranoia in the past week.  Pt reports mental and physical pain is at a 10/10.  Pt reported SI with feelings of hopelessness.  Pt reportedly took 6 of her lithium medication doses today.  Precipitating factor includes daughter being out of town and isolation.  Pt is very calm and cooperative.  Pt on a CADi waiver.  Pt is currently in the [facility].

## 2022-06-18 NOTE — ED TRIAGE NOTES
"Took extra dose of Lithium to help with emotional pain.  Took extra dose of lithium, 1500 mg.  States \"think tablets are 500 mg each took 3 tablets.     Triage Assessment     Row Name 06/17/22 2169       Triage Assessment (Adult)    Airway WDL WDL       Respiratory WDL    Respiratory WDL WDL       Skin Circulation/Temperature WDL    Skin Circulation/Temperature WDL WDL       Cardiac WDL    Cardiac WDL WDL       Peripheral/Neurovascular WDL    Peripheral Neurovascular WDL WDL       Cognitive/Neuro/Behavioral WDL    Cognitive/Neuro/Behavioral WDL WDL              "

## 2022-06-18 NOTE — H&P
"[facility]    History and Physical - Hospitalist Service       Date of Admission:  6/17/2022    Assessment & Plan      Maddy Ortiz is a 36 year old female with a history of type II DM, Bipolar with schizoaffective disorder, moderate persistent asthma admitted on 6/17/2022. She is admitted for [place] overdose. Poison control contacted. Patient to be admitted for monitoring lithium levels, then will likely need placement for additional support.     Bipolar 1 disorder  Schizoaffective disorder  - home medication: lithium 1500mg daily, risperidone injection 37.5 every 2 weeks (last given last week), olanzapine 15mg nightly, trazadone 50mg as needed for sleep  - pharmacy consult to confirm these medications as patient was very sleepy on exam today and was unable to provide detailed information  - hold lithium  - Poison control contacted, continue to check lithium levels until plateau/decreasing  - Telemetry ordered, check sodium levels daily  - ED placed patient on 72hr hold, due to concerns she would overdose again    DM2  - continue victoza    Moderate Persistent asthma  - continue albuterol prn  - change symbicort to breo ellipta while admitted    Seasonal allergies  - continue flonase and zyrtec    Hepatic steatosis, severe  - with hepatomegaly and splenomegaly  - seen on CT    Hypothyroidisim  - continue levothyroxine       Diet:   General diet  DVT Prophylaxis: Low Risk/Ambulatory with no VTE prophylaxis indicated  Jessica Catheter: Not present  Central Lines: None  Cardiac Monitoring: None  Code Status:   FULL CODE    Clinically Significant Risk Factors Present on Admission                  # Diabetes, type II: last A1C 7.3 % (Ref range: 0.0 - 5.6 %)  # Severe Obesity: Estimated body mass index is 43.58 kg/m  as calculated from the following:    Height as of 6/9/22: 1.6 m (5' 3\").    Weight as of 6/9/22: 111.6 kg (246 lb).      Disposition Plan   Expected Discharge:  " 6/18/2022   Anticipated discharge location:  Awaiting care coordination huddle  Delays:     likely to inpatient psychiatry        The patient's care was discussed with the Bedside Nurse and Patient.    Jerrod Breaux MD  Hospitalist Service  [facility]  Securely message with the Vocera Web Console (learn more here)  Text page via [facility] Paging/Directory         ______________________________________________________________________    Chief Complaint   Lithium overdose    History is obtained from the patient    History of Present Illness   Maddy Ortiz is a 36 year old female with a history of Type 2 DM, Bipolar 1 with schizoaffective disorder, moderate persistent asthma who presented to the ED via EMS for lithium overdose. Patient has been more worried and in higher degree of emotional distress so she took an extra dose of lithium 1500mg. One of the reasons for this is that her daughter, who she lives with, will be out of town until 6/25, who she relies on for emotional support. She called EMS because she knew she shouldn't take extra medications but she did and she is worried she will do it again. Generally she is pretty reliable about her medications but will occasionally forget.     Review of Systems    The 10 point Review of Systems is negative other than noted in the HPI or here.     Past Medical History    I have reviewed this patient's medical history and updated it with pertinent information if needed.   Past Medical History:   Diagnosis Date     Bipolar 1 disorder (H) 12/6/2012     Depressive disorder      Diabetes mellitus, type 2 (H) 12/13/2021     Paranoid type delusional disorder (H) 11/14/2012       Past Surgical History   I have reviewed this patient's surgical history and updated it with pertinent information if needed.  Past Surgical History:   Procedure Laterality Date     TONSILLECTOMY & ADENOIDECTOMY      as a child     TONSILLECTOMY & ADENOIDECTOMY          Social History   I have reviewed this patient's social history and updated it with pertinent information if needed.  Social History     Tobacco Use     Smoking status: Former Smoker     Packs/day: 0.50     Types: Cigarettes     Smokeless tobacco: Former User     Tobacco comment: around 2nd hand smoke   Vaping Use     Vaping Use: Never used   Substance Use Topics     Alcohol use: Not Currently     Drug use: Not Currently    lives at home with daughter    Family History   I have reviewed this patient's family history and updated it with pertinent information if needed.  Family History   Adopted: Yes   Problem Relation Age of Onset     Unknown/Adopted Mother      Unknown/Adopted Father      Unknown/Adopted Other      Hypertension Sister    No family history of DM2    Prior to Admission Medications   Prior to Admission Medications   Prescriptions Last Dose Informant Patient Reported? Taking?   EPINEPHrine (ANY BX GENERIC EQUIV) 0.3 MG/0.3ML injection 2-pack   Yes No   Sig: Inject 0.3 mg into the muscle as needed for anaphylaxis   Patient not taking: Reported on 6/1/2022   Melatonin 10 MG TABS tablet More than a month at Unknown time  No Yes   Sig: Take 1 tablet (10 mg) by mouth nightly as needed for sleep   OLANZapine (ZYPREXA) 15 MG tablet   Yes No   Sig: TAKE 1 TABLET BY MOUTH DAILY IN THE EVENING   acetaminophen (TYLENOL) 325 MG tablet 6/17/2022 at 2043  No Yes   Sig: Take 2 tablets (650 mg) by mouth every 4 hours as needed for mild pain (to moderate pain)   albuterol (PROAIR HFA/PROVENTIL HFA/VENTOLIN HFA) 108 (90 Base) MCG/ACT inhaler Past Week at Unknown time  No Yes   Sig: Inhale 2 puffs into the lungs every 4 hours as needed for wheezing or shortness of breath / dyspnea   ammonium lactate (LAC-HYDRIN) 12 % external cream  Self No No   Sig: Apply topically 2 times daily as needed for dry skin   azelastine (ASTELIN) 0.1 % nasal spray 6/16/2022 at Unknown time  No Yes   Sig: Spray 2 sprays into both nostrils  2 times daily as needed for rhinitis   blood glucose (NO BRAND SPECIFIED) test strip   No No   Sig: Use to test blood sugar 6 times daily or as directed.   Patient not taking: Reported on 2022   budesonide-formoterol (SYMBICORT) 80-4.5 MCG/ACT Inhaler   No No   Sig: Inhale 2 puffs into the lungs 2 times daily   cetirizine (ZYRTEC) 10 MG tablet 2022 at Unknown time  No Yes   Sig: Take 1-2 tablets (10-20 mg) by mouth nightly as needed for allergies or rhinitis   fluticasone (FLONASE) 50 MCG/ACT nasal spray   No No   Sig: Spray 2 sprays into both nostrils daily   gabapentin (NEURONTIN) 300 MG capsule 2022 at Unknown time  No Yes   Sig: Take 300 mg in  in afternoon and 600 mg at night.   insulin pen needle (32G X 4 MM) 32G X 4 MM miscellaneous   No No   Sig: Use 1 pen needles daily with Victoza   Patient not taking: Reported on 2022   levonorgestrel (ROMI) 13.5 MG IUD   Yes No   Si each (13.5 mg) by Intrauterine route once   levothyroxine (SYNTHROID/LEVOTHROID) 50 MCG tablet 2022 at Unknown time  No Yes   Sig: Take 1 tablet (50 mcg) by mouth every morning (before breakfast)   liraglutide (VICTOZA) 18 MG/3ML solution 2022 at Unknown time  No Yes   Sig: Inject 1.2 mg Subcutaneous daily Inject 0.6mg daily x 7 days, then increase to 1.2mg daily if tolerating)   lithium ER (ESKALITH CR/LITHOBID) 450 MG CR tablet 2022 at Unknown time  No Yes   Sig: Take 1 tablet (450 mg) by mouth every evening along with one 300 mg tablet for 750 mg total.   lithium ER (LITHOBID) 300 MG CR tablet 2022 at Unknown time  No Yes   Sig: Take 2 tablets (600 mg) by mouth every morning and take 1 tablet every evening along with one 450 mg tablet for 750 mg total in the evening   mineral oil-hydrophilic petrolatum (AQUAPHOR) external ointment   Yes No   Sig: Apply topically daily as needed for dry skin (apply to dry skin and rash around umbilical hernia)   Patient not taking: Reported on 2022    risperiDONE microspheres ER (RISPERDAL CONSTA) 37.5 MG injection Past Week  No Yes   Sig: Inject 2 mLs (37.5 mg) into the muscle every 14 days .  Due 4/7/2022.   terbinafine (LAMISIL AT) 1 % external cream   No No   Sig: Apply topically 2 times daily as needed (rash/itching.  Apply to feet.)   traZODone (DESYREL) 50 MG tablet   Yes No   Sig: TAKE 1-2 TABLETS BY MOUTH DAILY AT BEDTIME AS NEEDED FOR SLEEP      Facility-Administered Medications: None     Allergies   Allergies   Allergen Reactions     Dust Mites Shortness Of Breath     Animal Dander      Other reaction(s): *Unknown     Metformin Fatigue     Patient is taking extended release at home as of 3/3/22     Mold      Other reaction(s): Runny Nose     Trees        Physical Exam   Vital Signs: Temp: 97.6  F (36.4  C) Temp src: Oral BP: 124/75 Pulse: 102   Resp: 18 SpO2: 96 %      Weight: 0 lbs 0 oz    Gen: NAD, sitting comfortably in bed, obese, difficult to arouse  Eyes: EOMI, conjuctiva clear  Mouth: OP clear, no lesions  Neck: No cervical LAD  CV: RRR, no murmurs, 2+ radial pulses  RESP: CTA bilaterally, no w/r/c  Abd: soft, nontender, nondistended  Ext: no edema bilaterally  Neuro: CNII-CNXII intact     Data   Data reviewed today: I reviewed all medications, new labs and imaging results over the last 24 hours. I personally reviewed no images or EKG's today.    Recent Labs   Lab 06/17/22  2354   WBC 10.3   HGB 11.2*   MCV 93         POTASSIUM 3.8   CHLORIDE 109   CO2 22   BUN 7   CR 0.62   ANIONGAP 8   URSULA 9.5   *   ALBUMIN 3.8   PROTTOTAL 7.5   BILITOTAL 0.4   ALKPHOS 90   ALT 85*   AST 51*     No results found for this or any previous visit (from the past 24 hour(s)).

## 2022-06-18 NOTE — ED NOTES
Pt departing ED via stretcher per transport personnel, accompanied by security. Pt belongings, medications, and 72 hour hold sent with.

## 2022-06-18 NOTE — PLAN OF CARE
"Pt A/Ox3 w/fluctuating confusion about her situation. Denies physical pain but states she has \"emotional pain from the past.\" Pt reports taking Lithium to ease the pain of her 14yr old daughter being out of town until 6/25, pt states she thought it would help her cope and had not intentions to harm self. Denies thoughts of SI. Up ad mauricio in room, steady. 1:1 maintained for 72hr hold, placed this AM in ED.     Pt hyper-verbal and rambling upon admit to unit, stated anxiety and poor coping. Pt instructed in deep breathing and guided in re-focusing to present moment, thoughts and feelings acknowledged, questions answered, active listening provided. Pt is open to receiving psych consult's input and support, inquired about inpatient psych as an option stating she had been there in the past. Pt now sleeping and drowsy.     Lungs clear, pt denies SOB.     Cardiac WDL, tele initiated, pt denies chx pain.     Pt Glu monitored ACHS, daily scheduled Victoza. Lunch Glu missed, 1:1 instructed to get glu but was unable to before pt ate. Dinner Glu 119.      Writer spoke to poison control about pt Lithium lab trends, Lithium level to be checked 6/19 AM, continue to monitor sodium levels daily.     UA collected, drugs of abuse screen negative, HCG test negative.    Anticipated discharge to inpatient psych pending medical stability per MD.     "

## 2022-06-18 NOTE — ED PROVIDER NOTES
ED Provider Note  [facility]      History     Chief Complaint   Patient presents with     Drug Overdose     Pt. states accidental took double dose lithium today.  Pt. denies SI     HPI  Maddy Ortiz is a 36 year old female with hx of schizoaffective disorder, bipolar type who presents to the ED saying she was in emotional pain and so took an extra 1500 mg of lithium today.  She says she usually takes 1500 mg a day.  She says she lives with her daughter but she is out of town this week. Her daughter will return on June 25th.  She is worried that she will be tempted to take extra doses of meds again and feels she needs to be in the hospital. She feels she needs medication changes.  She says she sometimes forgets to take a dose of lithium but tries to take it as prescribed.     Past Medical History  Past Medical History:   Diagnosis Date     Bipolar 1 disorder (H) 12/6/2012     Depressive disorder      Diabetes mellitus, type 2 (H) 12/13/2021     Paranoid type delusional disorder (H) 11/14/2012     Past Surgical History:   Procedure Laterality Date     TONSILLECTOMY & ADENOIDECTOMY      as a child     TONSILLECTOMY & ADENOIDECTOMY       acetaminophen (TYLENOL) 325 MG tablet  albuterol (PROAIR HFA/PROVENTIL HFA/VENTOLIN HFA) 108 (90 Base) MCG/ACT inhaler  azelastine (ASTELIN) 0.1 % nasal spray  cetirizine (ZYRTEC) 10 MG tablet  gabapentin (NEURONTIN) 300 MG capsule  levothyroxine (SYNTHROID/LEVOTHROID) 50 MCG tablet  liraglutide (VICTOZA) 18 MG/3ML solution  lithium ER (ESKALITH CR/LITHOBID) 450 MG CR tablet  lithium ER (LITHOBID) 300 MG CR tablet  Melatonin 10 MG TABS tablet  risperiDONE microspheres ER (RISPERDAL CONSTA) 37.5 MG injection  ammonium lactate (LAC-HYDRIN) 12 % external cream  blood glucose (NO BRAND SPECIFIED) test strip  budesonide-formoterol (SYMBICORT) 80-4.5 MCG/ACT Inhaler  EPINEPHrine (ANY BX GENERIC EQUIV) 0.3 MG/0.3ML injection 2-pack  fluticasone (FLONASE) 50 MCG/ACT  nasal spray  insulin pen needle (32G X 4 MM) 32G X 4 MM miscellaneous  levonorgestrel (ROMI) 13.5 MG IUD  mineral oil-hydrophilic petrolatum (AQUAPHOR) external ointment  OLANZapine (ZYPREXA) 15 MG tablet  terbinafine (LAMISIL AT) 1 % external cream  traZODone (DESYREL) 50 MG tablet      Allergies   Allergen Reactions     Dust Mites Shortness Of Breath     Animal Dander      Other reaction(s): *Unknown     Metformin Fatigue     Patient is taking extended release at home as of 3/3/22     Mold      Other reaction(s): Runny Nose     Trees      Family History  Family History   Adopted: Yes   Problem Relation Age of Onset     Unknown/Adopted Mother      Unknown/Adopted Father      Unknown/Adopted Other      Hypertension Sister      Social History   Social History     Tobacco Use     Smoking status: Former Smoker     Packs/day: 0.50     Types: Cigarettes     Smokeless tobacco: Former User     Tobacco comment: around 2nd hand smoke   Vaping Use     Vaping Use: Never used   Substance Use Topics     Alcohol use: Not Currently     Drug use: Not Currently      Past medical history, past surgical history, medications, allergies, family history, and social history were reviewed with the patient. No additional pertinent items.       Review of Systems   Psychiatric/Behavioral: Positive for dysphoric mood. Negative for self-injury. The patient is nervous/anxious.    All other systems reviewed and are negative.    A complete review of systems was performed with pertinent positives and negatives noted in the HPI, and all other systems negative.    Physical Exam   BP: 124/75  Pulse: 102  Temp: 97.6  F (36.4  C)  Resp: 18  SpO2: 96 %  Physical Exam  Vitals and nursing note reviewed.   Constitutional:       Appearance: She is obese.      Comments: Alert.    HENT:      Head: Normocephalic and atraumatic.      Nose: No congestion or rhinorrhea.   Eyes:      Extraocular Movements: Extraocular movements intact.   Cardiovascular:      Rate  and Rhythm: Tachycardia present.   Pulmonary:      Effort: Pulmonary effort is normal.   Musculoskeletal:         General: No deformity or signs of injury.      Cervical back: Normal range of motion.      Comments: No tremor   Skin:     Coloration: Skin is not jaundiced or pale.   Neurological:      General: No focal deficit present.      Mental Status: She is alert and oriented to person, place, and time.   Psychiatric:         Mood and Affect: Mood is anxious and depressed. Affect is blunt and flat.         Speech: Speech normal.         Behavior: Behavior is slowed and withdrawn.         Thought Content: Thought content does not include homicidal ideation. Thought content does not include homicidal plan.         Cognition and Memory: Cognition and memory normal.         Judgment: Judgment is impulsive and inappropriate.         ED Course      Procedures       The medical record was reviewed and interpreted.  Mental Health Risk Assessment      PSS-3    Date and Time Over the past 2 weeks have you felt down, depressed, or hopeless? Over the past 2 weeks have you had thoughts of killing yourself? Have you ever attempted to kill yourself? When did this last happen? User   06/17/22 2240 yes no yes more than 6 months ago CCP      C-SSRS ([place])    Date and Time Q1 Wished to be Dead (Past Month) Q2 Suicidal Thoughts (Past Month) Q3 Suicidal Thought Method Q4 Suicidal Intent without Specific Plan Q5 Suicide Intent with Specific Plan Q6 Suicide Behavior (Lifetime) Within the Past 3 Months? RETIRED: Level of Risk per Screen Screening Not Complete User   06/17/22 2251 no no no no no yes -- -- -- CCP                Item Assessment   Suicidal Ideation denies si but took extra lithium   Plan Took lithium   Intent denies   Suicidal or self-harm behaviors Took pills   Risk Factors Previous psychiatric diagnosis and treatments   Protective Factors unclear              Results for orders placed or performed during the hospital  encounter of 06/17/22   Comprehensive metabolic panel     Status: None (Preliminary result)   Result Value Ref Range    Sodium 139 133 - 144 mmol/L    Potassium 3.8 3.4 - 5.3 mmol/L    Chloride 109 94 - 109 mmol/L    Carbon Dioxide (CO2)      Anion Gap      Urea Nitrogen      Creatinine      Calcium      Glucose      Alkaline Phosphatase      AST      ALT      Protein Total      Albumin      Bilirubin Total      GFR Estimate     CBC with platelets and differential     Status: Abnormal   Result Value Ref Range    WBC Count 10.3 4.0 - 11.0 10e3/uL    RBC Count 3.71 (L) 3.80 - 5.20 10e6/uL    Hemoglobin 11.2 (L) 11.7 - 15.7 g/dL    Hematocrit 34.5 (L) 35.0 - 47.0 %    MCV 93 78 - 100 fL    MCH 30.2 26.5 - 33.0 pg    MCHC 32.5 31.5 - 36.5 g/dL    RDW 13.0 10.0 - 15.0 %    Platelet Count 151 150 - 450 10e3/uL    % Neutrophils 69 %    % Lymphocytes 18 %    % Monocytes 7 %    % Eosinophils 4 %    % Basophils 1 %    % Immature Granulocytes 1 %    NRBCs per 100 WBC 0 <1 /100    Absolute Neutrophils 7.2 1.6 - 8.3 10e3/uL    Absolute Lymphocytes 1.9 0.8 - 5.3 10e3/uL    Absolute Monocytes 0.7 0.0 - 1.3 10e3/uL    Absolute Eosinophils 0.4 0.0 - 0.7 10e3/uL    Absolute Basophils 0.1 0.0 - 0.2 10e3/uL    Absolute Immature Granulocytes 0.1 <=0.4 10e3/uL    Absolute NRBCs 0.0 10e3/uL   EKG 12 lead     Status: None (Preliminary result)   Result Value Ref Range    Systolic Blood Pressure  mmHg    Diastolic Blood Pressure  mmHg    Ventricular Rate 96 BPM    Atrial Rate 96 BPM    VA Interval 176 ms    QRS Duration 84 ms     ms    QTc 449 ms    P Axis 65 degrees    R AXIS 68 degrees    T Axis 47 degrees    Interpretation ECG       Sinus rhythm  Nonspecific T wave abnormality  Abnormal ECG     CBC with platelets differential     Status: Abnormal    Narrative    The following orders were created for panel order CBC with platelets differential.  Procedure                               Abnormality         Status                    "  ---------                               -----------         ------                     CBC with platelets and d...[[id number]]  Abnormal            Final result                 Please view results for these tests on the individual orders.     Medications   0.9% sodium chloride BOLUS (1,000 mLs Intravenous New Bag 6/18/22 0004)     Followed by   sodium chloride 0.9% infusion (has no administration in time range)        Assessments & Plan (with Medical Decision Making)   Maddy Ortiz is a 36 year old female with hx of schizoaffective disorder, bipolar type who presents to the ED saying she was in emotional pain and so took an extra dose of lithium tonight. She says she took it to feel better and feels she will likely take more if she returns home.  She says she is doing it to \"feel better.\"  I spoke to [facility] who recommends IV hydration, lithium level now and in 4-6 hours. If the level is going up or staying the same she will need lithium levels every 2 hours until the level starts to decline.  Admission is recommended.  It is unclear if she will need medical admission or mental health admission.  She will remain in the ED for the first 4-6 hours and then if lithium level continue to climb she will need medicine admission.  Otherwise, she will need mental health admission.  Will sign out labs and observation to overnight MD.  ECG shows sinus rhythm,  flattened t waves but normal rate and intervals.      0006:  Patient alert and no tremor.  ecg shows flattened t waves.  Receiving NS IV.  Awaiting labs.  Will sign out to overnight MD. Discussed with Paul pharm d at [facility] poison [phone].     Patient says she wants to leave.  Placed on 72 hour hold due to concerns she will overdose again. Signed out to Dr. Strong.         I have reviewed the nursing notes. I have reviewed the findings, diagnosis, plan and need for follow up with the patient.    New Prescriptions    No medications on file "       Final diagnoses:   Schizoaffective disorder, bipolar type (H)   Overdose, undetermined intent, initial encounter       --  Ade Buenrostro MD  [facility] EMERGENCY DEPARTMENT  6/17/2022     Ade Buenrostro MD  06/18/22 0020

## 2022-06-18 NOTE — ED NOTES
"     Emergency Department Patient Sign-out       Brief HPI and ED course:  Patient is a 36 year old female signed out to me by Dr. Buenrostro.  See initial ED Provider note for details of the presentation. In brief, patient had an extra dose of lithium due to \"emotional pain\". Poison control contacted and recommended lithiumlevel and then rechecking after 4 to 6 hours, would need serial checks if rising.  EKG had mildly flattened T waves without other acute findings.  Acetaminophen and salicylate levels pending.    Vitals:   Patient Vitals for the past 24 hrs:   BP Temp Temp src Pulse Resp SpO2   06/17/22 2232 124/75 97.6  F (36.4  C) Oral 102 18 96 %       Received Sign-out Plan:    Pending studies include: CBC, CMP, salicylate, APAP, lithium level    Plan:   -Follow-up above pending studies, check lithium for change around 5 AM  -Likely admission to medicine versus mental health    Events after assuming care:  Normal anion gap.  Normal salicylate and acetaminophen levels.  TSH was mildly elevated but free T4 was within normal limits.    Initial lithium level 1.1, was increased to 1.5 on recheck after 5 hours.  Patient admitted to medicine.     --  Jan Strong MD   Emergency Medicine       Jan Strong MD  06/18/22 0703    "

## 2022-06-18 NOTE — PROGRESS NOTES
"Brief Social Work Note    SW attempted to meet w/pt. Spoke w/bedside RN. Pt was sleeping and could use sleep at this time.    Pt was manic upon admission with rapid, pressured speech. Per bedside RN, pt denied lithium overdose was intentional but pt states she \"took it to ease the emotional pain\".  Pt may benefit from stay as in patient on behavioral health to decompress and stabilize.    Pt reported to her nurse that pt's daughter (who is out of town this weekend) supports and assists patient with medication management by ensuring pt is taking her medications as prescribed and does not miss a dose.     SW will meet with pt to complete CMA when pt is not sleeping.    EUNICE Bishop, BETSY     [facility] Saturday     Text paging available through [employer] on [employer] - search SOCIAL WORK     ON CALL PAGER [phone]. [phone] Saturday ONLY!  ON CALL COVERAGE AFTER 1600 [phone]  "

## 2022-06-18 NOTE — ED NOTES
Bed: [facility]  Expected date:   Expected time:   Means of arrival:   Comments:  [facility]  36y F  Accidentally took double lithium dose

## 2022-06-19 LAB
ANION GAP SERPL CALCULATED.3IONS-SCNC: 7 MMOL/L (ref 3–14)
ANION GAP SERPL CALCULATED.3IONS-SCNC: 9 MMOL/L (ref 3–14)
BUN SERPL-MCNC: 12 MG/DL (ref 7–30)
BUN SERPL-MCNC: 9 MG/DL (ref 7–30)
CALCIUM SERPL-MCNC: 9.5 MG/DL (ref 8.5–10.1)
CALCIUM SERPL-MCNC: 9.8 MG/DL (ref 8.5–10.1)
CHLORIDE BLD-SCNC: 105 MMOL/L (ref 94–109)
CHLORIDE BLD-SCNC: 111 MMOL/L (ref 94–109)
CO2 SERPL-SCNC: 24 MMOL/L (ref 20–32)
CO2 SERPL-SCNC: 24 MMOL/L (ref 20–32)
CREAT SERPL-MCNC: 0.64 MG/DL (ref 0.52–1.04)
CREAT SERPL-MCNC: 0.65 MG/DL (ref 0.52–1.04)
GFR SERPL CREATININE-BSD FRML MDRD: >90 ML/MIN/1.73M2
GFR SERPL CREATININE-BSD FRML MDRD: >90 ML/MIN/1.73M2
GLUCOSE BLD-MCNC: 123 MG/DL (ref 70–99)
GLUCOSE BLD-MCNC: 186 MG/DL (ref 70–99)
GLUCOSE BLDC GLUCOMTR-MCNC: 109 MG/DL (ref 70–99)
GLUCOSE BLDC GLUCOMTR-MCNC: 123 MG/DL (ref 70–99)
GLUCOSE BLDC GLUCOMTR-MCNC: 289 MG/DL (ref 70–99)
GLUCOSE BLDC GLUCOMTR-MCNC: 307 MG/DL (ref 70–99)
GLUCOSE BLDC GLUCOMTR-MCNC: 309 MG/DL (ref 70–99)
HOLD SPECIMEN: NORMAL
LITHIUM SERPL-SCNC: 0.6 MMOL/L
POTASSIUM BLD-SCNC: 4.1 MMOL/L (ref 3.4–5.3)
POTASSIUM BLD-SCNC: 4.3 MMOL/L (ref 3.4–5.3)
SODIUM SERPL-SCNC: 138 MMOL/L (ref 133–144)
SODIUM SERPL-SCNC: 142 MMOL/L (ref 133–144)

## 2022-06-19 PROCEDURE — 36415 COLL VENOUS BLD VENIPUNCTURE: CPT | Performed by: INTERNAL MEDICINE

## 2022-06-19 PROCEDURE — 93010 ELECTROCARDIOGRAM REPORT: CPT | Performed by: INTERNAL MEDICINE

## 2022-06-19 PROCEDURE — 83036 HEMOGLOBIN GLYCOSYLATED A1C: CPT | Performed by: INTERNAL MEDICINE

## 2022-06-19 PROCEDURE — 250N000013 HC RX MED GY IP 250 OP 250 PS 637: Performed by: INTERNAL MEDICINE

## 2022-06-19 PROCEDURE — 258N000003 HC RX IP 258 OP 636: Performed by: INTERNAL MEDICINE

## 2022-06-19 PROCEDURE — 80178 ASSAY OF LITHIUM: CPT | Performed by: INTERNAL MEDICINE

## 2022-06-19 PROCEDURE — 99222 1ST HOSP IP/OBS MODERATE 55: CPT | Performed by: PSYCHIATRY & NEUROLOGY

## 2022-06-19 PROCEDURE — 80048 BASIC METABOLIC PNL TOTAL CA: CPT | Performed by: INTERNAL MEDICINE

## 2022-06-19 PROCEDURE — 250N000012 HC RX MED GY IP 250 OP 636 PS 637: Performed by: INTERNAL MEDICINE

## 2022-06-19 PROCEDURE — 99232 SBSQ HOSP IP/OBS MODERATE 35: CPT | Performed by: INTERNAL MEDICINE

## 2022-06-19 PROCEDURE — 120N000002 HC R&B MED SURG/OB UMMC

## 2022-06-19 PROCEDURE — 93005 ELECTROCARDIOGRAM TRACING: CPT

## 2022-06-19 RX ORDER — HYDROXYZINE HYDROCHLORIDE 25 MG/1
25 TABLET, FILM COATED ORAL EVERY 6 HOURS PRN
Status: DISCONTINUED | OUTPATIENT
Start: 2022-06-19 | End: 2022-06-22 | Stop reason: HOSPADM

## 2022-06-19 RX ADMIN — INSULIN ASPART 2 UNITS: 100 INJECTION, SOLUTION INTRAVENOUS; SUBCUTANEOUS at 22:26

## 2022-06-19 RX ADMIN — GABAPENTIN 300 MG: 300 CAPSULE ORAL at 21:21

## 2022-06-19 RX ADMIN — FLUTICASONE FUROATE AND VILANTEROL TRIFENATATE 1 PUFF: 100; 25 POWDER RESPIRATORY (INHALATION) at 07:36

## 2022-06-19 RX ADMIN — FLUTICASONE PROPIONATE 2 SPRAY: 50 SPRAY, METERED NASAL at 07:36

## 2022-06-19 RX ADMIN — GABAPENTIN 300 MG: 300 CAPSULE ORAL at 07:32

## 2022-06-19 RX ADMIN — LEVOTHYROXINE SODIUM 50 MCG: 50 TABLET ORAL at 07:33

## 2022-06-19 RX ADMIN — OLANZAPINE 5 MG: 5 TABLET, FILM COATED ORAL at 21:21

## 2022-06-19 RX ADMIN — LIRAGLUTIDE 1.2 MG: 6 INJECTION SUBCUTANEOUS at 07:38

## 2022-06-19 RX ADMIN — GABAPENTIN 300 MG: 300 CAPSULE ORAL at 15:00

## 2022-06-19 RX ADMIN — SODIUM CHLORIDE, PRESERVATIVE FREE: 5 INJECTION INTRAVENOUS at 05:33

## 2022-06-19 ASSESSMENT — ACTIVITIES OF DAILY LIVING (ADL)
ADLS_ACUITY_SCORE: 21

## 2022-06-19 NOTE — PLAN OF CARE
/68 (BP Location: Right arm, Patient Position: Right side, Cuff Size: Adult Large)   Pulse 100   Temp 98.6  F (37  C) (Oral)   Resp 16   LMP 06/17/2022 (Approximate)   SpO2 97%   Pt denies SOB or chest pain. Pt has been sleeping majority of this shift. Pt arouses to voice, disoriented to situation. Denies pain. Voids without difficulty -dribbles (pt using pads for this), LBM 6/18 per report. Ambulated to BR independently. No skin issue noted. Pt on 72 hold, 1:1 sitter in room. Pt denies SI thoughts this shift. L PIV infusing NS @ 125 ml/hr. No acute changes overnight. Continue POC.

## 2022-06-19 NOTE — PROGRESS NOTES
[facility]    Medicine Progress Note - Hospitalist Service, GOLD TEAM 16    Date of Admission:  6/17/2022    Assessment & Plan               Maddy Ortiz is a 36 year old female with a history of type II DM, Bipolar with schizoaffective disorder, moderate persistent asthma admitted on 6/17/2022. She is admitted for [place] overdose. Poison control contacted.     Bipolar 1 disorder  Schizoaffective disorder  - home medication: lithium 1500mg daily, risperidone injection 37.5 every 2 weeks (last given last week), olanzapine 15mg nightly, trazadone 50mg as needed for sleep  -   - Poison control contacted, continue to check lithium levels until plateau/decreasing  - Telemetry ordered,   on 6/19  - ED placed patient on 72hr hold, due to concerns she would overdose again    Psychiatry consulted for     DM2  - continue victoza    Moderate Persistent asthma  - continue albuterol prn  - change symbicort to breo ellipta while admitted    Seasonal allergies  - continue flonase and zyrtec    Hepatic steatosis, severe  - with hepatomegaly and splenomegaly  - seen on CT    Hypothyroidisim  - continue levothyroxine         Diet: Combination Diet Regular Diet Adult    DVT Prophylaxis: ambulate  Jessica Catheter: Not present  Central Lines: None  Cardiac Monitoring: ACTIVE order. Indication: Drug overdose (24 hours)  Code Status: Full Code      Disposition Plan   Expected Discharge:    Anticipated discharge location:  Awaiting care coordination huddle  Delays:          The patient's care was discussed with the Bedside Nurse.    Gely Arambula MD  Hospitalist Service, GOLD TEAM 16  [facility]  Securely message with the Vocera Web Console (learn more here)  Text page via [employer] Paging/Directory   Please see signed in provider for up to date coverage information      Clinically Significant Risk Factors Present on Admission             #  "Diabetes, type II: last A1C 7.3 % (Ref range: 0.0 - 5.6 %)  # Severe Obesity: Estimated body mass index is 43.58 kg/m  as calculated from the following:    Height as of 6/9/22: 1.6 m (5' 3\").    Weight as of 6/9/22: 111.6 kg (246 lb).      ______________________________________________________________________    Interval History   No new symptoms reported per nursing staff .  Last night notes reviewed .  No chest pain or Shortness of breath reported.  No vomiting   No difficulty with voiding   Passing gas .    4 system ROS reviewed .    Data reviewed today: I reviewed all medications, new labs and imaging results over the last 24 hours.    Physical Exam   Vital Signs: Temp: 98.6  F (37  C) Temp src: Oral BP: 105/68 Pulse: 100   Resp: 16 SpO2: 97 % O2 Device: None (Room air)    Weight: 0 lbs 0 oz  Constitutional: awake, alert, cooperative, no apparent distress, and appears stated age  Eyes: Lids and lashes normal, pupils equal, round and reactive to light, extra ocular muscles intact, sclera clear, conjunctiva normal  Hematologic / Lymphatic: no cervical lymphadenopathy  Respiratory: No increased work of breathing, good air exchange, clear to auscultation bilaterally, no crackles or wheezing  Cardiovascular: Normal apical impulse, regular rate and rhythm, normal S1 and S2, no S3 or S4, and no murmur noted  GI: No scars, normal bowel sounds, soft, non-distended, non-tender, no masses palpated,   Skin: no bruising or bleeding  Neurologic: Awake, alert, oriented to name, place and time.  Cranial nerves II-XII are grossly intact.   Neuropsychiatric: General: normal, calm and normal eye contact    Data   Recent Labs   Lab 06/19/22  0743 06/19/22  0656 06/19/22  0259 06/18/22  0858 06/17/22  2354   WBC  --   --   --   --  10.3   HGB  --   --   --   --  11.2*   MCV  --   --   --   --  93   PLT  --   --   --   --  151   NA  --  142  --   --  139   POTASSIUM  --  4.1  --   --  3.8   CHLORIDE  --  111*  --   --  109   CO2  --  " 24  --   --  22   BUN  --  9  --   --  7   CR  --  0.64  --   --  0.62   ANIONGAP  --  7  --   --  8   URSULA  --  9.5  --   --  9.5   * 123* 109*   < > 186*   ALBUMIN  --   --   --   --  3.8   PROTTOTAL  --   --   --   --  7.5   BILITOTAL  --   --   --   --  0.4   ALKPHOS  --   --   --   --  90   ALT  --   --   --   --  85*   AST  --   --   --   --  51*    < > = values in this interval not displayed.

## 2022-06-19 NOTE — CONSULTS
"Consult Date: 06/19/2022    PSYCHIATRY CONSULTATION    IDENTIFICATION:  Ms. Ortiz is a 36-year-old female who is currently hospitalized with a lithium overdose.  I am asked to evaluate her current psychiatric status by Dr. Arambula.    Prior to interviewing this patient, I had an opportunity to review the most recent psychiatric discharge summary completed by PHILL Barahona.  Ms. Leonardjez gave the patient a diagnosis of schizoaffective disorder, bipolar type and notes that prior to that admission, the patient was having significant psychotic symptoms.  I also had an opportunity to review the patient's nurse and 1:1 who report that she continues to have rapid pressured speech and flight of ideas; however, when they leave the room she generally simply falls asleep.  She is quite loquacious when people ask her questions.  When I went to see the patient, she was pleasant and cooperative.  She tells me that she has \"connections\" that other people do not understand and frankly when she began to describe her various connections I too could not understand her.  She tells me that she had a lithium overdose by mistake.  Apparently, she was feeling a great deal of pain.  By this I believe she means psychic pain and also bad thoughts.  I really do not know whether these were suicidal ideation or some other type of bad thoughts because she was  unable to articulate them.  At any rate, she was having psychic pain, bad thoughts and decided to take her lithium.  She then took it again and then forgot that she had taken it and took it again.  I am unclear how many times she took her regular dose of lithium, it was either 2 or 3.  At any rate, she reports this was not a suicide attempt, but rather an attempt to relieve her psychic pain.  I am concerned that she seems to continue to be quite disorganized and I think she may continue to have a mild thought disorder.  I understand that this is slowly improving.  She has been " placed on a 72-hour hold, so tomorrow a decision will need to be made as to whether or not to pursue commitment.  I am unclear on her current placement.  She lives in an apartment in [place] and I am unsure whether that has any type of assisted living.  I do not believe that in her current state, she should be discharged back to independent living, but there may be supervision available that I am unaware of.    PAST MEDICAL HISTORY:  Includes various psychiatric diagnoses.  These have included bipolar depression, delusional disorder, but most recently, schizoaffective disorder, which may well be accurate.  She also has a history of diabetes type 2, moderate persistent asthma, seasonal allergies.  Hepatic steatosis with hepatomegaly and splenomegaly seen on CT as well as hypothyroidism.    FAMILY HISTORY:  The patient reports that she is adopted and her family history is unknown.    SOCIAL HISTORY:  The patient tells me that she lives in an apartment in [place] with her 14-year-old daughter.  The daughter is apparently currently with a friend on a family vacation.  She tells me that she frequently gets secondhand marijuana smoke, but she denies abusing drugs herself and her U-tox was negative.    PHYSICAL REVIEW OF SYSTEMS:  The patient denied headache or problems with vision or hearing.  She denied chest pain or shortness of breath, but she does have a cough.  She denied abdominal pain.  She does report recent diarrhea.  She denied problems with muscles, skin or joints or genitourinary symptoms.    MENTAL STATUS EXAM:  On my interview, the patient was pleasant and cooperative.  She tended to look out into space when she answered questions with poor eye contact.  Her mood seems somewhat dysphoric.  Her affect was restricted.  Her speech was soft, but coherent and goal oriented.  She was quite loquacious when she answered questions and somewhat tangential often over inclusive.  Her associations were not always  "tight and she actually describes having \"connections\" that others do not understand.  She was unable to articulate what these connections might be.  Her content of thought was without clear hallucinations or delusions.  She did not report suicidal ideation.  She may continue to have a thought disorder.  Recent memory seems somewhat impaired.  Remote memory, fund of knowledge and use of language are at baseline.  Her concentration seemed somewhat impaired.  She is alert and oriented x 3.  Insight and judgment continue to be somewhat guarded.  Muscle strength and tone appear to be at baseline.  Recent vitals include a blood pressure of 135/80, temperature of 99, pulse of 104, respiration rate of 16 with 99% oxygen saturation.    ASSESSMENT:  Schizoaffective disorder, bipolar type.    RECOMMENDATIONS:  Continue current medications.  Tomorrow we will need to deal with her hold and decide whether or not to petition.  It appears to me that she would benefit from a transfer to inpatient Psychiatry when available.  This will need to be reassessed throughout the hospitalization.    Sarmad Cox MD        D: 2022   T: 2022   MT: EUSEBIO    Name:     HARVEY MITCHELL  MRN:      [id number]        Account:      [id number]   :      1985           Consult Date: 2022     Document: [id number]  "

## 2022-06-19 NOTE — PROGRESS NOTES
ORIENTATION: A/O x3. Fluctuations regarding situation.  TRANSFERS/AMBULATION: up ad mauricio without AD  DIET: reg/thin/whole. Denies n/v. HS  glu 131  BOWEL/BLADDER: cont BL; dribbles, wears liners. Cont BM; LBM 6/18 per pt report..   PAIN: denies physical pain. reports emotional pain. Requesting prn for discharge when feeling manic, irritable, hysteric; sticky note left for provider. Consented to melatonin this evening. Denies SOB, chest pain.   LDA: PIV to L hand infusing sodium chloride 125 mL/hour, continuous.   SKIN: no concerns noted  Other: pt rambles and hyper verbal with moments of reported anxiety. Active listening, thoughts/feelings acknowledged & cued deep breathing. Lights decreased, TV on for distraction beneficial after dinner.  Denies thoughts of SI. Consented to spiritual health services; order placed. Continues with 1:1 and 72 hour hold placed in ED this AM. [place] labs to be checked 6/19 AM.
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
## 2022-06-19 NOTE — PLAN OF CARE
Patient is alert, flat response, oriented. Sitter bedside. On 72 hours hold.     VSS. On RA.     Denied ideation or plan of suicide.     Continuously c/o emotional pain, chuy visited her this shift.     Behavioral  health provider saw her this PM.     Lung sounds clear, TELE in place, sinus rhythm. Skin intact. Denied pain, chest pain, SOB, nausea.     Independent.     L pIV, SL.     Continent of bladder and Bm.     Good appetite.      Lithium level 0.6 today.     Continue with POC.

## 2022-06-20 LAB
ANION GAP SERPL CALCULATED.3IONS-SCNC: 10 MMOL/L (ref 3–14)
ATRIAL RATE - MUSE: 99 BPM
BUN SERPL-MCNC: 10 MG/DL (ref 7–30)
CALCIUM SERPL-MCNC: 9.1 MG/DL (ref 8.5–10.1)
CHLORIDE BLD-SCNC: 107 MMOL/L (ref 94–109)
CO2 SERPL-SCNC: 20 MMOL/L (ref 20–32)
CREAT SERPL-MCNC: 0.56 MG/DL (ref 0.52–1.04)
DIASTOLIC BLOOD PRESSURE - MUSE: NORMAL MMHG
GFR SERPL CREATININE-BSD FRML MDRD: >90 ML/MIN/1.73M2
GLUCOSE BLD-MCNC: 270 MG/DL (ref 70–99)
GLUCOSE BLDC GLUCOMTR-MCNC: 140 MG/DL (ref 70–99)
GLUCOSE BLDC GLUCOMTR-MCNC: 143 MG/DL (ref 70–99)
GLUCOSE BLDC GLUCOMTR-MCNC: 165 MG/DL (ref 70–99)
GLUCOSE BLDC GLUCOMTR-MCNC: 168 MG/DL (ref 70–99)
GLUCOSE BLDC GLUCOMTR-MCNC: 174 MG/DL (ref 70–99)
GLUCOSE BLDC GLUCOMTR-MCNC: 174 MG/DL (ref 70–99)
GLUCOSE BLDC GLUCOMTR-MCNC: 263 MG/DL (ref 70–99)
INTERPRETATION ECG - MUSE: NORMAL
P AXIS - MUSE: 31 DEGREES
POTASSIUM BLD-SCNC: 3.9 MMOL/L (ref 3.4–5.3)
PR INTERVAL - MUSE: 176 MS
QRS DURATION - MUSE: 84 MS
QT - MUSE: 396 MS
QTC - MUSE: 508 MS
R AXIS - MUSE: 75 DEGREES
SODIUM SERPL-SCNC: 137 MMOL/L (ref 133–144)
SYSTOLIC BLOOD PRESSURE - MUSE: NORMAL MMHG
T AXIS - MUSE: 50 DEGREES
VENTRICULAR RATE- MUSE: 99 BPM

## 2022-06-20 PROCEDURE — 250N000013 HC RX MED GY IP 250 OP 250 PS 637: Performed by: INTERNAL MEDICINE

## 2022-06-20 PROCEDURE — 93010 ELECTROCARDIOGRAM REPORT: CPT | Performed by: INTERNAL MEDICINE

## 2022-06-20 PROCEDURE — 36415 COLL VENOUS BLD VENIPUNCTURE: CPT | Performed by: INTERNAL MEDICINE

## 2022-06-20 PROCEDURE — 82310 ASSAY OF CALCIUM: CPT | Performed by: INTERNAL MEDICINE

## 2022-06-20 PROCEDURE — 99232 SBSQ HOSP IP/OBS MODERATE 35: CPT | Mod: 25 | Performed by: PSYCHIATRY & NEUROLOGY

## 2022-06-20 PROCEDURE — 93005 ELECTROCARDIOGRAM TRACING: CPT

## 2022-06-20 PROCEDURE — 99232 SBSQ HOSP IP/OBS MODERATE 35: CPT | Performed by: INTERNAL MEDICINE

## 2022-06-20 PROCEDURE — 250N000013 HC RX MED GY IP 250 OP 250 PS 637: Performed by: PSYCHIATRY & NEUROLOGY

## 2022-06-20 PROCEDURE — 120N000002 HC R&B MED SURG/OB UMMC

## 2022-06-20 RX ORDER — LITHIUM CARBONATE 300 MG/1
300 TABLET, FILM COATED, EXTENDED RELEASE ORAL EVERY 12 HOURS SCHEDULED
Status: DISCONTINUED | OUTPATIENT
Start: 2022-06-20 | End: 2022-06-21

## 2022-06-20 RX ORDER — GUAR GUM
1 PACKET (EA) ORAL DAILY
Status: DISCONTINUED | OUTPATIENT
Start: 2022-06-21 | End: 2022-06-22 | Stop reason: HOSPADM

## 2022-06-20 RX ADMIN — GABAPENTIN 300 MG: 300 CAPSULE ORAL at 07:51

## 2022-06-20 RX ADMIN — LITHIUM CARBONATE 300 MG: 300 TABLET, EXTENDED RELEASE ORAL at 20:11

## 2022-06-20 RX ADMIN — GABAPENTIN 300 MG: 300 CAPSULE ORAL at 14:31

## 2022-06-20 RX ADMIN — FLUTICASONE FUROATE AND VILANTEROL TRIFENATATE 1 PUFF: 100; 25 POWDER RESPIRATORY (INHALATION) at 07:50

## 2022-06-20 RX ADMIN — ACETAMINOPHEN 325MG 650 MG: 325 TABLET ORAL at 14:32

## 2022-06-20 RX ADMIN — LIRAGLUTIDE 1.2 MG: 6 INJECTION SUBCUTANEOUS at 07:51

## 2022-06-20 RX ADMIN — LEVOTHYROXINE SODIUM 50 MCG: 50 TABLET ORAL at 07:51

## 2022-06-20 RX ADMIN — GABAPENTIN 300 MG: 300 CAPSULE ORAL at 20:11

## 2022-06-20 RX ADMIN — OLANZAPINE 5 MG: 5 TABLET, FILM COATED ORAL at 21:56

## 2022-06-20 RX ADMIN — FLUTICASONE PROPIONATE 2 SPRAY: 50 SPRAY, METERED NASAL at 07:49

## 2022-06-20 ASSESSMENT — ACTIVITIES OF DAILY LIVING (ADL)
ADLS_ACUITY_SCORE: 21
DEPENDENT_IADLS:: TRANSPORTATION;SHOPPING
ADLS_ACUITY_SCORE: 21

## 2022-06-20 NOTE — PROGRESS NOTES
Pt speech was slow when first awake but became more rapid over the course of the day.    Pt deliberated that her current state may be caused by limited food intake due to resources at home. Pt stated multiple times concerns over interpersonal dynamics between her neighbors and family, pressure to work that overtaxed her personal resources and made her more ill, and desire to act independently while requiring more support and resources.    Pt contacted group therapist to attend her weekly group remotely; checked in over the phone while unable to otherwise participate due to confidentiality and sitter's presence. Pt also contacted daughter, instructing daughter to block communications with a pair of daughter's peers, then contacted daughter's school about peer relationships.    Pt expresses desire to stabilize medications and frustration at waiting.

## 2022-06-20 NOTE — PROGRESS NOTES
Patient BG in 300s.   Ordered insulin aspart med ssi.   Monitor and adjust as needed.     Cross Cover.
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
## 2022-06-20 NOTE — PROGRESS NOTES
"[facility]    Medicine Progress Note - Hospitalist Service, GOLD TEAM 16    Date of Admission:  6/17/2022    Assessment & Plan                   6/20    EKG   Start metformin  Her 72 hours hold expires on 6/22 at 1159 pm   Psych and SW consulted        Maddy Ortiz is a 36 year old female with a history of type II DM, Bipolar with schizoaffective disorder, moderate persistent asthma admitted on 6/17/2022. She is admitted for [place] overdose. Poison control contacted.     Bipolar 1 disorder  Schizoaffective disorder  Consulted psychiatry   Consulted SW  She remains on 72 hours hold  Psych medications to be addressed by psychiatry   currently on 5 mg Zyprexa       DM2  - continue victoza  Start metformin 500 mg BID   Carb consistent diet     Moderate Persistent asthma  - continue albuterol prn  - change symbicort to breo ellipta while admitted    Seasonal allergies  - continue flonase and zyrtec    Hepatic steatosis, severe  - with hepatomegaly and splenomegaly  - seen on CT    Hypothyroidisim  - continue levothyroxine      # Severe Obesity: Estimated body mass index is 43.58 kg/m  as calculated from the following:    Height as of 6/9/22: 1.6 m (5' 3\").    Weight as of 6/9/22: 111.6 kg (246 lb).         Diet: Moderate Consistent Carb (60 g CHO per Meal) Diet    DVT Prophylaxis: Low Risk/Ambulatory with no VTE prophylaxis indicated  Jessica Catheter: Not present  Central Lines: None  Cardiac Monitoring: None  Code Status: Full Code      Disposition Plan   Expected Discharge:   Anticipated discharge location:  Awaiting care coordination huddle  Delays:           The patient's care was discussed with the Bedside Nurse, Care Coordinator/ and Patient.    Gely Arambula MD  Hospitalist Service, GOLD TEAM 16  [facility]  Securely message with the Vocera Web Console (learn more here)  Text page via ePrep " "Paging/Directory   Please see signed in provider for up to date coverage information      Clinically Significant Risk Factors Present on Admission             # Diabetes, type II: last A1C 7.3 % (Ref range: 0.0 - 5.6 %)  # Severe Obesity: Estimated body mass index is 43.58 kg/m  as calculated from the following:    Height as of 6/9/22: 1.6 m (5' 3\").    Weight as of 6/9/22: 111.6 kg (246 lb).      ______________________________________________________________________    Interval History   No new issues  No cp   No sob   No fever   No chills  BG elevated     Data reviewed today: I reviewed all medications, new labs and imaging results over the last 24 hours.   Physical Exam   Vital Signs: Temp: 98.3  F (36.8  C) Temp src: Oral BP: 127/72 Pulse: 107   Resp: 16 SpO2: 97 % O2 Device: None (Room air)    Weight: 0 lbs 0 oz  Constitutional: awake, alert, cooperative, no apparent distress, and appears stated age  Hematologic / Lymphatic: no cervical lymphadenopathy  Respiratory: No increased work of breathing, good air exchange, clear to auscultation bilaterally, no crackles or wheezing  Cardiovascular: Normal apical impulse, regular rate and rhythm, normal S1 and S2,  and no murmur noted  GI: No scars, normal bowel sounds, soft, non-distended, non-tender, no masses palpated,   Skin: no bruising or bleeding  Neurologic: Awake, alert, oriented to name, place and time.  Cranial nerves II-XII are grossly intact.   Neuropsychiatric: General: normal, calm and normal eye contact    Data   Recent Labs   Lab 06/20/22  1150 06/20/22  0745 06/20/22  0647 06/19/22  2105 06/19/22  1347 06/19/22  0743 06/19/22  0656 06/18/22  0858 06/17/22  2354   WBC  --   --   --   --   --   --   --   --  10.3   HGB  --   --   --   --   --   --   --   --  11.2*   MCV  --   --   --   --   --   --   --   --  93   PLT  --   --   --   --   --   --   --   --  151   NA  --   --  137  --  138  --  142  --  139   POTASSIUM  --   --  3.9  --  4.3  --  4.1  --  " 3.8   CHLORIDE  --   --  107  --  105  --  111*  --  109   CO2  --   --  20  --  24  --  24  --  22   BUN  --   --  10  --  12  --  9  --  7   CR  --   --  0.56  --  0.65  --  0.64  --  0.62   ANIONGAP  --   --  10  --  9  --  7  --  8   URSULA  --   --  9.1  --  9.8  --  9.5  --  9.5   * 263* 270*   < > 186*   < > 123*   < > 186*   ALBUMIN  --   --   --   --   --   --   --   --  3.8   PROTTOTAL  --   --   --   --   --   --   --   --  7.5   BILITOTAL  --   --   --   --   --   --   --   --  0.4   ALKPHOS  --   --   --   --   --   --   --   --  90   ALT  --   --   --   --   --   --   --   --  85*   AST  --   --   --   --   --   --   --   --  51*    < > = values in this interval not displayed.
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
## 2022-06-20 NOTE — CONSULTS
"  PSYCHIATRIC CONSULTATION, follow-up    Requesting Physician: Dr. Jerrod Breaux    Admission Date: 2022  Date of Service: 2022    The patient was seen, her chart reviewed.  Her Lithium level yesterday was 0.6 mEq/L. She is on 72-hour hold and on 1:1 observation. She is on Social Security Disability since 2017.  She continues to be somewhat disorganized, verbose and repetitive. She could not tell me whether she was depressed and made a long pause after I asked her whether she had suicidal thoughts. Finally, she said that she cannot answer this question. She was able to tell me that she took extra dose of Lithium \"by mistake\" and it wasn't an attempt to kill herself.  She tells me that her mood has been stable on Lithium.    This is a 36 year old single  mother of one with a long history of schizoaffective disorder, bipolar type, generalized anxiety disorder, and diabetes who initially presented to our ED after she took an extra 1500 mg of her Lithium because she was in emotional pain. She also had an injection of here scheduled 37.5mg dose of Risperdal Consta on a day of admission. Her Lithium level upon admission was 1.5 mEq/L and her Lithium was held. Yesterday she was seen by Dr. Cox for initial psychiatric consultation and I refer the reader to his note. The patient was put on 72-hour hold on 22 which will  tomorrow. Dr. Cox was thinking about petitioning her for MI commitment.    MEDICATIONS  Risperdal Consta 37.5 mg IM Q 2 Weeks, last injection on 22  Zyprexa 5 mg at bed time  Gabapentin 300 mg TID  Trazodone 50 mg HS PRN     LABS: Blood Chemistry today was WNL. Glucose by meter was 263 at 7:45, 168 at 11:50 and 143 at 15:32     MENTAL STATUS EXAMINATION  Revealed a normally built and normally developed, somewhat obese 36-year-old  female appearing her stated age. She was alert and oriented X 3. Her speech was circular and disorganized. She showed no objective signs " of depression and could not clearly answer whether she had suicidal thoughts. She denied hallucinations and no prominent delusions could be noted or elicited. She had marginal, if any insight into her problems and her judgement was impaired.    DIAGNOSTIC IMPRESSION  Schizoaffective Disorder, bipolar type  Anxiety Disorder NOS  Diabetes Mellitus, typeII    Plan: I will discontinue Trazodone and resume Lithium in a smaller dose of 300 mg BID. Continue Zyprexa and Neurontin without change. The patient needs more time in the hospital to determine her safety.    Darnell Mejía MD

## 2022-06-20 NOTE — PLAN OF CARE
BP (!) 145/84 (BP Location: Right arm, Patient Position: Sitting, Cuff Size: Adult Large)   Pulse 107   Temp 98.7  F (37.1  C) (Oral)   Resp 16   LMP 06/17/2022 (Approximate)   SpO2 96%     Pt alert and oriented x4, denies any pain, nausea, or SOB. PIV in left hand SL, dressing changed. Blood glucose 263 @ 0745 and 168 @ 1150, insulin given per sliding scale. Pt has a sitter in the room. Pt independent, able to use restroom and is continent of bowel and bladder. Pt uses call light appropriately and is able to make needs known. Continue with the POC.

## 2022-06-21 LAB
ATRIAL RATE - MUSE: 105 BPM
DIASTOLIC BLOOD PRESSURE - MUSE: NORMAL MMHG
GLUCOSE BLDC GLUCOMTR-MCNC: 101 MG/DL (ref 70–99)
GLUCOSE BLDC GLUCOMTR-MCNC: 138 MG/DL (ref 70–99)
GLUCOSE BLDC GLUCOMTR-MCNC: 154 MG/DL (ref 70–99)
GLUCOSE BLDC GLUCOMTR-MCNC: 176 MG/DL (ref 70–99)
GLUCOSE BLDC GLUCOMTR-MCNC: 217 MG/DL (ref 70–99)
HBA1C MFR BLD: 6 % (ref 0–5.6)
INTERPRETATION ECG - MUSE: NORMAL
P AXIS - MUSE: 37 DEGREES
PR INTERVAL - MUSE: 160 MS
QRS DURATION - MUSE: 84 MS
QT - MUSE: 352 MS
QTC - MUSE: 465 MS
R AXIS - MUSE: 68 DEGREES
SYSTOLIC BLOOD PRESSURE - MUSE: NORMAL MMHG
T AXIS - MUSE: 44 DEGREES
VENTRICULAR RATE- MUSE: 105 BPM

## 2022-06-21 PROCEDURE — 120N000002 HC R&B MED SURG/OB UMMC

## 2022-06-21 PROCEDURE — 99232 SBSQ HOSP IP/OBS MODERATE 35: CPT | Performed by: INTERNAL MEDICINE

## 2022-06-21 PROCEDURE — 250N000013 HC RX MED GY IP 250 OP 250 PS 637: Performed by: INTERNAL MEDICINE

## 2022-06-21 PROCEDURE — 250N000013 HC RX MED GY IP 250 OP 250 PS 637: Performed by: PHYSICIAN ASSISTANT

## 2022-06-21 PROCEDURE — 250N000013 HC RX MED GY IP 250 OP 250 PS 637: Performed by: PSYCHIATRY & NEUROLOGY

## 2022-06-21 PROCEDURE — 99232 SBSQ HOSP IP/OBS MODERATE 35: CPT

## 2022-06-21 PROCEDURE — 250N000013 HC RX MED GY IP 250 OP 250 PS 637

## 2022-06-21 RX ORDER — LITHIUM CARBONATE 450 MG
450 TABLET, EXTENDED RELEASE ORAL EVERY 12 HOURS SCHEDULED
Status: COMPLETED | OUTPATIENT
Start: 2022-06-21 | End: 2022-06-21

## 2022-06-21 RX ADMIN — OLANZAPINE 5 MG: 5 TABLET, FILM COATED ORAL at 21:47

## 2022-06-21 RX ADMIN — FLUTICASONE FUROATE AND VILANTEROL TRIFENATATE 1 PUFF: 100; 25 POWDER RESPIRATORY (INHALATION) at 09:06

## 2022-06-21 RX ADMIN — Medication 1 PACKET: at 09:36

## 2022-06-21 RX ADMIN — LIRAGLUTIDE 1.2 MG: 6 INJECTION SUBCUTANEOUS at 09:07

## 2022-06-21 RX ADMIN — GABAPENTIN 300 MG: 300 CAPSULE ORAL at 20:44

## 2022-06-21 RX ADMIN — LITHIUM CARBONATE 450 MG: 450 TABLET, EXTENDED RELEASE ORAL at 20:44

## 2022-06-21 RX ADMIN — GABAPENTIN 300 MG: 300 CAPSULE ORAL at 09:36

## 2022-06-21 RX ADMIN — FLUTICASONE PROPIONATE 2 SPRAY: 50 SPRAY, METERED NASAL at 09:08

## 2022-06-21 RX ADMIN — LITHIUM CARBONATE 300 MG: 300 TABLET, EXTENDED RELEASE ORAL at 09:06

## 2022-06-21 RX ADMIN — LEVOTHYROXINE SODIUM 50 MCG: 50 TABLET ORAL at 09:05

## 2022-06-21 RX ADMIN — GABAPENTIN 300 MG: 300 CAPSULE ORAL at 14:23

## 2022-06-21 RX ADMIN — INSULIN ASPART 1 UNITS: 100 INJECTION, SOLUTION INTRAVENOUS; SUBCUTANEOUS at 21:49

## 2022-06-21 RX ADMIN — METFORMIN HYDROCHLORIDE 500 MG: 500 TABLET, FILM COATED ORAL at 09:06

## 2022-06-21 ASSESSMENT — ACTIVITIES OF DAILY LIVING (ADL)
ADLS_ACUITY_SCORE: 21
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
## 2022-06-21 NOTE — CONSULTS
"      Psychiatry Consultation; Follow up              Reason for Consult, requesting source:    Medication adjustments, 72-hour hold expiring.   Requesting source: Jevon    Labs and imaging reviewed, patient seen and evaluated by PHILL Knight CNP               Interim history:    Maddy Ortiz is a 36 year old female with a history of type II DM, Bipolar with schizoaffective disorder, moderate persistent asthma admitted on 6/17/2022. She is admitted for [place] overdose. Poison control contacted. Patient's [place] was restarted on 6/20 and patient is tolerating well. Patient is alert and oriented to person/place/time situation. Upon assessment, patient adamantly denies feeling suicidal and states that she didn't have any \"PRN\" medications and felt she was in psychological pain prior to admission so took more Lithium. She denies current suicidal ideation. Patient was sitting facing window and had a hard time making eye contact with provider. She denies AVH and states she knows she talks fast sometimes but doesn't feel manic because she is tired she says. Patient was circumstantial in speech, but was easily redirectable and returned to the topic and answered questions appropriately. She was tangential at times, began talking about her psychological and physical pain and listed all of her provider individually and who rates her psychological pain vs. Physical pain. She had her planner at bedside and was motivated to follow-up with her providers outpatient.        Current Medications:       fiber modular (NUTRISOURCE FIBER)  1 packet Oral Daily     fluticasone  2 spray Both Nostrils Daily     fluticasone-vilanterol  1 puff Inhalation Daily     gabapentin  300 mg Oral TID     insulin aspart  1-7 Units Subcutaneous TID AC     insulin aspart  1-5 Units Subcutaneous At Bedtime     levothyroxine  50 mcg Oral QAM AC     liraglutide  1.2 mg Subcutaneous Daily     lithium ER  300 mg Oral Q12H [facility] (08/20)     OLANZapine " " 5 mg Oral At Bedtime     sodium chloride (PF)  3 mL Intracatheter Q8H              MSE:   Appearance: awake, alert, adequately groomed and dressed in hospital scrubs  Attitude:  cooperative  Eye Contact:  poor   Mood:  better  Affect:  appropriate and in normal range and mood congruent  Speech:  clear, coherent, circumstantial   Psychomotor Behavior:  no evidence of tardive dyskinesia, dystonia, or tics  Thought Process:  disorganized at times   Associations:  no loose associations  Thought Content:  no evidence of suicidal ideation or homicidal ideation and no evidence of psychotic thought  Insight:  fair  Judgement:  fair  Oriented to:  time, person, and place  Attention Span and Concentration:  intact  Recent and Remote Memory:  intact    Vital signs:  Temp: 98.8  F (37.1  C) Temp src: Oral BP: 125/83 Pulse: 93   Resp: 16 SpO2: 97 % O2 Device: None (Room air)        Estimated body mass index is 43.58 kg/m  as calculated from the following:    Height as of 6/9/22: 1.6 m (5' 3\").    Weight as of 6/9/22: 111.6 kg (246 lb).              DSM-5 Diagnosis:   1. Schizoaffective disorder, bipolar type   2. Generalized Anxiety disorder          Assessment:   It appears patient's mental status has cleared since being evaluated by psychiatry yesterday and has made some improvements. She adamantly denies feeling suicidal or that taking more Lithium was an attempt. She clearly states that she now knows that Lithium is not an as needed medication and she needs to take it as prescribed. Patient continues to be somewhat disorganized, however she is making good improvements since being admitted. Due to making improvements daily, will not pursue commitment as she is agreeable to restarting medications, being reevaluated by psychiatry tomorrow and has good outpatient follow-up. She is tolerating restarting the Lithium, will increase back to PTA dose.           Summary of Recommendations:     1. Patient to get 450mg on Lithium " tonight. Tomorrow she will be back to her PTA dose of 750mg at bed time.     2. Continue other psych medications as prescribed.     3. Psych to re-evaluate patient tomorrow. Patient agreeable to staying and following up with psychiatry tomorrow.

## 2022-06-21 NOTE — PLAN OF CARE
"Pt admitted 6/17 for [place] overdose, with hx of  bipolar  depression, schizoaffective and hx of asthma    /73 (BP Location: Left arm)   Pulse 107   Temp 98.5  F (36.9  C) (Oral)   Resp 18   LMP 06/17/2022 (Approximate)   SpO2 95%      Pt. Alert and oriented x 3, able to follow command and make needs known but slow to respond. Pt states \"medications have made me a bit drowsy\" Denies suicidal ideation. Pt. Is currently on 72 hr hold. 1:1 sitter at bedside.    Pt. Started Lithium.  Trazodone discontinued. Patient was seen by psych.    Patient C/O of numbness and tingling on right arm, shoulder and fingers    Telly discontinued    Skin dry and intact. Bruising on right lower abdomen      Umbilical hernia. Pt. States \"I've had it for about 5 years and its gotten bigger since I gained weight\"    Able to move independently. Abdominal tenderness during palpation. Patient states that she feels like she's constipated and is unable to re-call her last bm. New Order received for fiber packets daily   "

## 2022-06-21 NOTE — PROGRESS NOTES
"[facility]    Medicine Progress Note - Hospitalist Service, GOLD TEAM 16    Date of Admission:  6/17/2022    Assessment & Plan          Maddy Ortiz is a 36 year old female with a history of type II DM, Bipolar with schizoaffective disorder, moderate persistent asthma admitted on 6/17/2022. She is admitted for [place] overdose. Poison control contacted.      Bipolar 1 disorder  Schizoaffective disorder   lithium overdose apparently took  In attempt to treat her psychosis  and also ? If injected risperidone as well    - patient  Seen by psychiatry   - lithium level on admission was 1.5  And lithium was held , now was restarted by psychiatry    - Her 72 hours hold expires on 6/22 at 1159 pm so will need to make decision regarding  Weather to pursue commitment, will defer back to psychiatry   - 6/21 Trazadone  was stopped  And [place] restarted at lower dose at 300 mg bid , her Neurontin and Zyprexa were continued   Psych and SW consulted      Increased sedation  - patient  Feels ist form her medications, blood sugar ok   - asked psych to see and adjust medications as needed      DM2  - continue victoza  -  on  metformin 500 mg BID but now not eating as much so will hold   -Carb consistent diet   - check HgA1c      Moderate Persistent asthma  - continue albuterol prn  - changede symbicort to breo ellipta while admitted     Seasonal allergies  - continue flonase and zyrtec     Hepatic steatosis, severe  - with hepatomegaly and splenomegaly  - seen on CT     Anemia  - no evidenece of acute bleed , monitor     Hypothyroidisim  - continue levothyroxine, TSH was up at 9.74 6/17 but ? How she was taking her meds so would  recheck in 2-3 weeks and adjust as needed          morbid Obesity: has been loosing weight , follows with bariatric surgery , Estimated body mass index is 43.58 kg/m  as calculated from the following:    Height as of 6/9/22: 1.6 m (5' 3\").    Weight as of " "6/9/22: 111.6 kg (246 lb).             Diet: Moderate Consistent Carb (60 g CHO per Meal) Diet    DVT Prophylaxis: Low Risk/Ambulatory with no VTE prophylaxis indicated  Jessica Catheter: Not present  Central Lines: None  Cardiac Monitoring: None  Code Status: Full Code               Disposition Plan   Expected Discharge: 06/22/2022     Anticipated discharge location:  Awaiting care coordination huddle     The patient's care was discussed with  Care team     Rita Escoto MD  Hospitalist Service, GOLD TEAM 16  [facility]  Securely message with the Vocera Web Console (learn more here)  Text page via [facility] Paging/Directory   Please see signed in provider for up to date coverage information      Clinically Significant Risk Factors Present on Admission             # Diabetes, type II: last A1C 7.3 % (Ref range: 0.0 - 5.6 %)  # Severe Obesity: Estimated body mass index is 43.58 kg/m  as calculated from the following:    Height as of 6/9/22: 1.6 m (5' 3\").    Weight as of 6/9/22: 111.6 kg (246 lb).      ______________________________________________________________________    Interval History      Feels very sleepy and feels very tired , thinks its form her psych medications, denies chest pain  No shortness of breath      Data reviewed today: I reviewed all medications, new labs and imaging results over the last 24 hours.    Physical Exam   Vital Signs: Temp: 98.8  F (37.1  C) Temp src: Oral BP: 125/83 Pulse: 93   Resp: 16 SpO2: 97 % O2 Device: None (Room air)    Weight: 0 lbs 0 oz     General appearence: sleepy  But wakes up and answers questions     HEENT: EOMI, PEARLA, sclera nonicteric,  Moist  mucus membranes,   NECK : supple  RESPIRATORY: lungs clear to auscultation bilateral   CARDIOVASCULAR:S1 S2 regular rate and rhythm, no rubs gallops or murmurs appreciated  GASTROINTESTINAL:soft, non-distended , non-tender , + bowel sounds, no masses felt   SKIN: warm and dry, no " mottling noted   NEUROLOGIC; sleepy but wakes up easily   EXTREMITIES: no clubbing, cyanosis or edema , moves all extremity, good pedal pulses   MUSCULOSKELETAL: without deformity       Data   Recent Labs   Lab 06/21/22  0912 06/21/22  0157 06/20/22  2145 06/20/22  0745 06/20/22  0647 06/19/22  2105 06/19/22  1347 06/19/22  0743 06/19/22  0656 06/18/22  0858 06/17/22  2354   WBC  --   --   --   --   --   --   --   --   --   --  10.3   HGB  --   --   --   --   --   --   --   --   --   --  11.2*   MCV  --   --   --   --   --   --   --   --   --   --  93   PLT  --   --   --   --   --   --   --   --   --   --  151   NA  --   --   --   --  137  --  138  --  142  --  139   POTASSIUM  --   --   --   --  3.9  --  4.3  --  4.1  --  3.8   CHLORIDE  --   --   --   --  107  --  105  --  111*  --  109   CO2  --   --   --   --  20  --  24  --  24  --  22   BUN  --   --   --   --  10  --  12  --  9  --  7   CR  --   --   --   --  0.56  --  0.65  --  0.64  --  0.62   ANIONGAP  --   --   --   --  10  --  9  --  7  --  8   URSULA  --   --   --   --  9.1  --  9.8  --  9.5  --  9.5   * 138* 174*   < > 270*   < > 186*   < > 123*   < > 186*   ALBUMIN  --   --   --   --   --   --   --   --   --   --  3.8   PROTTOTAL  --   --   --   --   --   --   --   --   --   --  7.5   BILITOTAL  --   --   --   --   --   --   --   --   --   --  0.4   ALKPHOS  --   --   --   --   --   --   --   --   --   --  90   ALT  --   --   --   --   --   --   --   --   --   --  85*   AST  --   --   --   --   --   --   --   --   --   --  51*    < > = values in this interval not displayed.     No results found for this or any previous visit (from the past 24 hour(s)).

## 2022-06-21 NOTE — PLAN OF CARE
"Writer spoke w/ Dr. Mejía to clarify if pt needed SI precautions order. Pt okay without SI precautions because she is on a 1:1 and has neither denied or admitted SI.    Patient mentioned that her abdominal discomfort could be due to a bumble bee she swallowed. She also said, \"can you imagine if I swallowed a toy?\" While smiling at staff.  "

## 2022-06-21 NOTE — PROGRESS NOTES
Clinical Nutrition Services- Brief Note    Reviewed nutrition risk factors due to Admission Nutrition Risk Screen positive for unsure weight loss. Pt is tolerating a Moderate Consistent Carb diet, eating well per nursing documentation and patient report.     Weight trends:  Wt Readings from Last Encounters:   06/09/22 111.6 kg (246 lb)   05/31/22 111.6 kg (246 lb)   05/19/22 111.6 kg (246 lb)   05/11/22 112.4 kg (247 lb 12.8 oz)   04/27/22 116.2 kg (256 lb 3.2 oz)   04/25/22 114.3 kg (252 lb)   04/12/22 114.8 kg (253 lb)   04/08/22 114.8 kg (253 lb)   03/29/22 107 kg (236 lb)   03/03/22 120.2 kg (265 lb)   02/07/22 114.8 kg (253 lb)   02/04/22 114.9 kg (253 lb 3.2 oz)   01/08/22 116.1 kg (256 lb)   12/13/21 111.1 kg (245 lb)   11/30/21 115.9 kg (255 lb 9.6 oz)   11/08/21 117.4 kg (258 lb 12.8 oz)   11/05/21 117.9 kg (260 lb)   10/26/21 114.3 kg (252 lb)   10/21/21 115.4 kg (254 lb 6.6 oz)   09/23/21 108.9 kg (240 lb)     No nutrition issues identified at this time. RD will continue to follow per nutrition protocol.    Jeannette Arrieta MS, RDN, LDN  8A RD pager: [phone]

## 2022-06-21 NOTE — PLAN OF CARE
Pt A/Ox4, drowsy and rousing to repeated voice or shaking in AM. Flat affect. Pt alert, rousing spontaneously in afternoon. Pt believes Zyprexa dose contributing to drowsiness, MD aware, pt informed Psych per pt.     Pt denies SI. Denies SOB, chx pan, n/v.     Pt skin intact.     Lung sounds clear, equal.     L PIV SL.     Pt denied pain, reported some sciatica discomfort in R arm, scheduled gabapentin given.     Pt to discharge pending resolution of 72hr hold and care coordination.

## 2022-06-22 VITALS
TEMPERATURE: 98 F | RESPIRATION RATE: 18 BRPM | OXYGEN SATURATION: 98 % | DIASTOLIC BLOOD PRESSURE: 89 MMHG | SYSTOLIC BLOOD PRESSURE: 132 MMHG | HEART RATE: 104 BPM

## 2022-06-22 LAB
GLUCOSE BLDC GLUCOMTR-MCNC: 116 MG/DL (ref 70–99)
GLUCOSE BLDC GLUCOMTR-MCNC: 132 MG/DL (ref 70–99)

## 2022-06-22 PROCEDURE — 250N000013 HC RX MED GY IP 250 OP 250 PS 637: Performed by: INTERNAL MEDICINE

## 2022-06-22 PROCEDURE — 99232 SBSQ HOSP IP/OBS MODERATE 35: CPT

## 2022-06-22 PROCEDURE — 99239 HOSP IP/OBS DSCHRG MGMT >30: CPT | Performed by: INTERNAL MEDICINE

## 2022-06-22 PROCEDURE — 250N000013 HC RX MED GY IP 250 OP 250 PS 637: Performed by: PHYSICIAN ASSISTANT

## 2022-06-22 RX ORDER — HYDROXYZINE HYDROCHLORIDE 25 MG/1
25 TABLET, FILM COATED ORAL EVERY 6 HOURS PRN
Qty: 40 TABLET | Refills: 0 | Status: SHIPPED | OUTPATIENT
Start: 2022-06-22 | End: 2022-08-25 | Stop reason: DRUGHIGH

## 2022-06-22 RX ORDER — RISPERIDONE 50 MG/2 ML
KIT INTRAMUSCULAR
Start: 2022-06-22

## 2022-06-22 RX ORDER — LITHIUM CARBONATE 150 MG/1
CAPSULE ORAL
Qty: 150 CAPSULE | Refills: 0 | Status: SHIPPED | OUTPATIENT
Start: 2022-06-22 | End: 2022-08-25 | Stop reason: DRUGHIGH

## 2022-06-22 RX ADMIN — Medication 1 PACKET: at 08:03

## 2022-06-22 RX ADMIN — FLUTICASONE FUROATE AND VILANTEROL TRIFENATATE 1 PUFF: 100; 25 POWDER RESPIRATORY (INHALATION) at 08:04

## 2022-06-22 RX ADMIN — FLUTICASONE PROPIONATE 2 SPRAY: 50 SPRAY, METERED NASAL at 08:04

## 2022-06-22 RX ADMIN — LIRAGLUTIDE 1.2 MG: 6 INJECTION SUBCUTANEOUS at 08:04

## 2022-06-22 RX ADMIN — HYDROXYZINE HYDROCHLORIDE 25 MG: 25 TABLET, FILM COATED ORAL at 06:45

## 2022-06-22 RX ADMIN — LEVOTHYROXINE SODIUM 50 MCG: 50 TABLET ORAL at 08:03

## 2022-06-22 RX ADMIN — GABAPENTIN 300 MG: 300 CAPSULE ORAL at 08:03

## 2022-06-22 ASSESSMENT — ACTIVITIES OF DAILY LIVING (ADL)
ADLS_ACUITY_SCORE: 21
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
## 2022-06-22 NOTE — PROGRESS NOTES
Pt was discharged around 1400.  Physically and mentally stable.  She will be picked up by a service.  Medications given.  AVS signed. All belongings collected and taken with pt.

## 2022-06-22 NOTE — CONSULTS
"      Psychiatry Consultation; Follow up              Reason for Consult, requesting source:    F/u on 72 hour hold. Seen by psychiatry 6/19, 6/21, and 6/21.     Requesting source: Rita Escoto MD    Labs and imaging reviewed, patient seen and evaluated by PHILL Knight CNP. Case discussed with nursing.                Interim history:    Maddy Ortiz is a 36 year old female with a history of type II DM, Bipolar with schizoaffective disorder, moderate persistent asthma admitted on 6/17/2022. She is admitted for [place] overdose. Poison control contacted. Patient's [place] was restarted on 6/20 and patient is tolerating well. Now on PTA dose of Lithium. Patient is alert and oriented to person/place/time situation. Upon assessment, patient adamantly denies feeling suicidal and states that she didn't have any \"PRN\" medications and felt she was in psychological pain prior to admission so took more Lithium. She states that here in the hospital she has been using Atarax as needed and has found it very helpful. Patient asked about splitting her Lithium dose so she receives some in the morning and some at night so she feels more stable. Patient is more organized and less circumstantial in speech today. Denies SI/HI/AVH.         Current Medications:       fiber modular (NUTRISOURCE FIBER)  1 packet Oral Daily     fluticasone  2 spray Both Nostrils Daily     fluticasone-vilanterol  1 puff Inhalation Daily     gabapentin  300 mg Oral TID     insulin aspart  1-7 Units Subcutaneous TID AC     insulin aspart  1-5 Units Subcutaneous At Bedtime     levothyroxine  50 mcg Oral QAM AC     liraglutide  1.2 mg Subcutaneous Daily     lithium ER  750 mg Oral At Bedtime     OLANZapine  5 mg Oral At Bedtime     sodium chloride (PF)  3 mL Intracatheter Q8H              MSE:   Appearance: awake, alert and adequately groomed  Attitude:  cooperative  Eye Contact:  poor   Mood:  better  Affect:  appropriate and in normal range and " "mood congruent  Speech:  clear, coherent, less circumstantial   Psychomotor Behavior:  no evidence of tardive dyskinesia, dystonia, or tics  Thought Process:  logical, linear and goal oriented  Associations:  no loose associations  Thought Content:  no evidence of suicidal ideation or homicidal ideation and no evidence of psychotic thought  Insight:  fair  Judgement:  intact  Oriented to:  time, person, and place  Attention Span and Concentration:  intact  Recent and Remote Memory:  intact    Vital signs:  Temp: 98  F (36.7  C) Temp src: Oral BP: 132/89 Pulse: 104   Resp: 18 SpO2: 98 % O2 Device: None (Room air)        Estimated body mass index is 43.58 kg/m  as calculated from the following:    Height as of 6/9/22: 1.6 m (5' 3\").    Weight as of 6/9/22: 111.6 kg (246 lb).              DSM-5 Diagnosis:   1. Schizoaffective disorder, bipolar type   2. Generalized Anxiety disorder          Assessment:   Maddy's mental status continues to improve and appears to be near baseline. Patient's speech is no longer tangential/circumstantial and continues to be insightful regarding her recent Lithium overdose. Patient states she knows that her Lithium is to be taken exactly as prescribed and agrees to use Atarax as needed as it works well for her anxiety. Denies SI/HI/AVH, does not appear manic nor psychotic. Patient has good outpatient follow up including a nurse who comes once a week to set up her medications, [facility] worker that comes at least once a week, and an established psych provider she will follow up for regarding medications and Lithium labs.           Summary of Recommendations:     1. Patient to be sent with Rx of Atarax upon discharge along with other psych meds. Lithium to be ordered for discharge 300mg in the morning and 450mg at bedtime.     2. Patient planning on following up with her psych med provider as soon as possible.     3. Discontinued hold and sitter.              "

## 2022-06-22 NOTE — DISCHARGE SUMMARY
[facility]  Hospitalist Discharge Summary      Date of Admission:  6/17/2022  Date of Discharge:  6/22/2022  2:16 PM  Discharging Provider: Rita Escoto MD  Discharge Service: Hospitalist Service, GOLD TEAM 16    Discharge Diagnoses     Bipolar 1 disorder  Schizoaffective disorder   lithium overdose apparently took  In attempt to treat her psychosis  and also ? If injected risperidone as well    Increased sedation  T2DM  Moderate persistent asthma   Hepatic steatosis  Morbid obesity       Follow-ups Needed After Discharge   Follow-up Appointments     Adult [facility] Follow-up and recommended labs and tests      Follow up with primary care provider, Shirley Freeman, within 7 -14 days   for hospital follow- up.  The following labs/tests are recommended: TSH in   next 2-3 weeks .      Follow up  with your psychiatrist  as soon as possible     Appointments on [place] and/or [facility] (with [facility] or [facility]   provider or service). Call [phone] if you haven't heard regarding   these appointments within 7 days of discharge.             Unresulted Labs Ordered in the Past 30 Days of this Admission     No orders found from 5/18/2022 to 6/18/2022.          Discharge Disposition   Discharged to home  Condition at discharge: Stable      Hospital Course      Maddy Ortiz is a 36 year old female with a history of type II DM, Bipolar with schizoaffective disorder, moderate persistent asthma admitted on 6/17/2022. She is admitted for [place] overdose. Poison control contacted.      Bipolar 1 disorder  Schizoaffective disorder   lithium overdose apparently took  In attempt to treat her psychosis  and also ? If injected risperidone as well    - lithium level on admission was 1.5  And lithium was held , now was restarted by psychiatry  and discharge  Dose 300 mg in AM and 450 mg in PM   -  6/21 Trazadone  was stopped   - patient  Was cleared for discharge  To [facility] by  "psychiatry 6/22        Increased sedation  -  6/21, on 6/22 she is doing very well, back to her baseline        DM2  - continue victoza  -  on  metformin 500 mg BID but  Was not eating as much so was held    -continue Carb consistent diet   - her  HgA1c was 6       Moderate Persistent asthma  - continue albuterol prn  - changede symbicort to breo ellipta while admitted, no issues      Seasonal allergies  - continue flonase and zyrtec     Hepatic steatosis, severe  - with hepatomegaly and splenomegaly  - seen on CT     Anemia  - no evidenece of acute bleed , monitor      Hypothyroidisim  - continue levothyroxine, TSH was up at 9.74 6/17 but ? How she was taking her meds so would  recheck in 2-3 weeks and adjust as needed          morbid Obesity: has been loosing weight , follows with bariatric surgery , Estimated body mass index is 43.58 kg/m  as calculated from the following:    Height as of 6/9/22: 1.6 m (5' 3\").    Weight as of 6/9/22: 111.6 kg (246 lb).        Consultations This Hospital Stay   MEDICATION HISTORY IP PHARMACY CONSULT  PSYCHIATRY IP CONSULT  CARE MANAGEMENT / SOCIAL WORK IP CONSULT  SPIRITUAL HEALTH SERVICES IP CONSULT  PSYCHIATRY IP CONSULT  PSYCHIATRY IP CONSULT  PSYCHIATRY IP CONSULT  PSYCHIATRY IP CONSULT  PSYCHIATRY IP CONSULT    Code Status   Prior    Time Spent on this Encounter   I, Rita Escoto MD, personally saw the patient today and spent greater than 30 minutes discharging this patient.       Rita Escoto MD  [facility] [address]  [address] [place] [id number]  Phone: [phone]  Fax: [phone]  ______________________________________________________________________    Physical Exam   Vital Signs: Temp: 98  F (36.7  C) Temp src: Oral BP: 132/89 Pulse: 104   Resp: 18 SpO2: 98 % O2 Device: None (Room air)    Weight: 0 lbs 0 oz   General appearence: awake alert  In  no apparent distress    HEENT: EOMI, PEARLA, sclera nonicteric,  moist,  mucus membranes,   NECK : " supple  RESPIRATORY: lungs clear to auscultation bilateral,   CARDIOVASCULAR:S1 S2 regular rate and rhythm, no rubs gallops or murmurs appreciated  GASTROINTESTINAL:soft, non-distended , non-tender , + bowel sounds, no masses felt   SKIN: warm and dry, no mottling noted   NEUROLOGIC; awake alert and oriented, no focal deficits found  EXTREMITIES: no clubbing, cyanosis or edema , moves all extremity, good pedal pulses   MUSCULOSKELETAL: without deformity          Primary Care Physician   Shirley Freeman    Discharge Orders      Activity    Your activity upon discharge: activity as tolerated     Reason for your hospital stay    You were  hospitalized for unintentional lithium overdose     Adult [facility] Follow-up and recommended labs and tests    Follow up with primary care provider, Shirley Freeman, within 7 -14 days for hospital follow- up.  The following labs/tests are recommended: TSH in next 2-3 weeks .      Follow up  with your psychiatrist  as soon as possible     Appointments on [place] and/or [facility] (with [facility] or [facility] provider or service). Call [phone] if you haven't heard regarding these appointments within 7 days of discharge.     Diet    Follow this diet upon discharge: as tolerated       Significant Results and Procedures   Most Recent 3 CBC's:Recent Labs   Lab Test 06/17/22  2354 04/25/22  0911 04/08/22  2322   WBC 10.3 10.7 13.6*   HGB 11.2* 12.0 11.3*   MCV 93 93 93    185 214     Most Recent 3 BMP's:Recent Labs   Lab Test 06/22/22  1124 06/22/22  0751 06/21/22  2148 06/20/22  0745 06/20/22  0647 06/19/22  2105 06/19/22  1347 06/19/22  0743 06/19/22  0656   NA  --   --   --   --  137  --  138  --  142   POTASSIUM  --   --   --   --  3.9  --  4.3  --  4.1   CHLORIDE  --   --   --   --  107  --  105  --  111*   CO2  --   --   --   --  20  --  24  --  24   BUN  --   --   --   --  10  --  12  --  9   CR  --   --   --   --  0.56  --  0.65  --  0.64   ANIONGAP  --   --   --   --  10   --  9  --  7   URSULA  --   --   --   --  9.1  --  9.8  --  9.5   * 132* 217*   < > 270*   < > 186*   < > 123*    < > = values in this interval not displayed.     Most Recent 2 LFT's:Recent Labs   Lab Test 06/17/22  2354 04/25/22  0911   AST 51* 147*   ALT 85* 109*   ALKPHOS 90 92   BILITOTAL 0.4 0.6   ,   Results for orders placed or performed during the hospital encounter of 04/08/22   CT Chest Pulmonary Embolism w Contrast    Narrative    EXAM: CT CHEST WITH CONTRAST - PULMONARY EMBOLISM PROTOCOL   LOCATION: [facility]  DATE/TIME: 4/9/2022 1:43 AM    INDICATION: Shortness of breath. Positive D-dimer.  COMPARISON: 3/3/2022 - CT abdomen and pelvis.    TECHNIQUE: CT angiogram chest during pulmonary arterial phase injection IV contrast. Dose reduction techniques were used.   CONTRAST: 92 mL Isovue-[id number].    FINDINGS:    ANGIOGRAM CHEST: No visualized pulmonary embolus. The thoracic aorta is normal in caliber without dissection.    LUNGS AND PLEURA: A few curvilinear opacities scattered in both lungs likely represent atelectasis or scarring. The lungs are otherwise clear. A trace amount of right pleural fluid.    MEDIASTINUM/AXILLAE: Unremarkable.    CORONARY ARTERY CALCIFICATION: Absent.    MUSCULOSKELETAL: No acute findings.    UPPER ABDOMEN: The liver is prominent in size. Moderate diffuse fatty infiltration of the liver.      Impression    IMPRESSION:   1. A trace amount of right pleural fluid.  2. The lungs are clear.  3. No visualized pulmonary embolus.        Discharge Medications   Discharge Medication List as of 6/22/2022  1:50 PM      START taking these medications    Details   hydrOXYzine (ATARAX) 25 MG tablet Take 1 tablet (25 mg) by mouth every 6 hours as needed for other (adjuvant pain), Disp-40 tablet, R-0, Local Print      lithium (ESKALITH) 150 MG capsule Take 300 mg in AM and 450 mg at night, Disp-150 capsule, R-0, Local Print         CONTINUE these medications which  have CHANGED    Details   risperiDONE microspheres ER (RISPERDAL CONSTA) 50 MG injection Next due 6/30, No Print Out         CONTINUE these medications which have NOT CHANGED    Details   acetaminophen (TYLENOL) 325 MG tablet Take 2 tablets (650 mg) by mouth every 4 hours as needed for mild pain (to moderate pain), No Print Out      albuterol (PROAIR HFA/PROVENTIL HFA/VENTOLIN HFA) 108 (90 Base) MCG/ACT inhaler Inhale 2 puffs into the lungs every 4 hours as needed for wheezing or shortness of breath / dyspnea, Disp-18 g, R-1, E-PrescribePharmacy may dispense brand covered by insurance (Proair, or proventil or ventolin or generic albuterol inhaler)      ammonium lactate (LAC-HYDRIN) 12 % external cream Apply topically 2 times daily as needed for dry skinDisp-385 g, [id number]      azelastine (ASTELIN) 0.1 % nasal spray Spray 2 sprays into both nostrils 2 times daily as needed for rhinitis, Disp-30 mL, R-3, E-Prescribe      budesonide-formoterol (SYMBICORT) 80-4.5 MCG/ACT Inhaler Inhale 2 puffs into the lungs 2 times daily, Disp-10.2 g, R-3, E-Prescribe      cetirizine (ZYRTEC) 10 MG tablet Take 1-2 tablets (10-20 mg) by mouth nightly as needed for allergies or rhinitis, Disp-90 tablet, R-0, E-Prescribe      EPINEPHrine (ANY BX GENERIC EQUIV) 0.3 MG/0.3ML injection 2-pack Inject 0.3 mg into the muscle as needed for anaphylaxis, Historical      fluticasone (FLONASE) 50 MCG/ACT nasal spray Spray 2 sprays into both nostrils daily, Disp-15.8 mL, R-1, E-Prescribe      gabapentin (NEURONTIN) 300 MG capsule Take 300 mg in  in afternoon and 600 mg at night., Disp-120 capsule, R-3, E-Prescribe      levothyroxine (SYNTHROID/LEVOTHROID) 50 MCG tablet Take 1 tablet (50 mcg) by mouth every morning (before breakfast), Disp-30 tablet, R-0, E-Prescribe      liraglutide (VICTOZA) 18 MG/3ML solution Inject 1.2 mg Subcutaneous daily Inject 0.6mg daily x 7 days, then increase to 1.2mg daily if tolerating), Disp-6 mL, R-3,  E-Prescribe      mineral oil-hydrophilic petrolatum (AQUAPHOR) external ointment Apply topically daily as needed for dry skin (apply to dry skin and rash around umbilical hernia)Historical      terbinafine (LAMISIL AT) 1 % external cream Apply topically 2 times daily as needed (rash/itching.  Apply to feet.)Disp-30 g, [id number]      blood glucose (NO BRAND SPECIFIED) test strip Use to test blood sugar 6 times daily or as directed., Disp-300 strip, R-11, E-Prescribe      insulin pen needle (32G X 4 MM) 32G X 4 MM miscellaneous Use 1 pen needles daily with VictozaDisp-50 each, [id number]      OLANZapine (ZYPREXA) 5 MG tablet Take 5 mg by mouth At Bedtime, Historical         STOP taking these medications       lithium ER (ESKALITH CR/LITHOBID) 450 MG CR tablet Comments:   Reason for Stopping:         Melatonin 10 MG TABS tablet Comments:   Reason for Stopping:         traZODone (DESYREL) 50 MG tablet Comments:   Reason for Stopping:             Allergies   Allergies   Allergen Reactions     Dust Mites Shortness Of Breath     Animal Dander      Other reaction(s): *Unknown     Metformin Fatigue     Patient is taking extended release at home as of 3/3/22     Mold      Other reaction(s): Runny Nose     Trees

## 2022-06-23 ENCOUNTER — PATIENT OUTREACH (OUTPATIENT)
Dept: CARE COORDINATION | Facility: CLINIC | Age: 37
End: 2022-06-23

## 2022-06-23 DIAGNOSIS — Z71.89 OTHER SPECIFIED COUNSELING: ICD-10-CM

## 2022-06-23 NOTE — PROGRESS NOTES
"Clinic Care Coordination Contact  [facility]: Post-Discharge Note  SITUATION                                                      Admission:    Admission Date: 06/17/22   Reason for Admission: Lithium overdose  Discharge:   Discharge Date: 06/22/22  Discharge Diagnosis: Bipolar 1 disorder  Schizoaffective disorder   lithium overdose apparently took  In attempt to treat her psychosis  and also ? If injected risperidone as well    Increased sedation  T2DM  Moderate persistent asthma   Hepatic steatosis  Morbid obesity    BACKGROUND                                                      Per hospital discharge summary and inpatient provider notes:    Maddy Ortiz is a 36 year old female with a history of type II DM, Bipolar with schizoaffective disorder, moderate persistent asthma admitted on 6/17/2022. She is admitted for [place] overdose. Poison control contacted.            ASSESSMENT      Enrollment  Primary Care Care Coordination Status:  (not offered during call. Patient focused on childhood events and her opinion about how others see her)    Discharge Assessment  How are you doing now that you are home?: \"Better\", but at times feels terrified, angry and states she is relaxing and isolating in her room  How are your symptoms? (Red Flag symptoms escalate to triage hotline per guidelines): Improved  Do you feel your condition is stable enough to be safe at home until your provider visit?: Yes  Does the patient have their discharge instructions? : Unknown  Were you started on any new medications or were there changes to any of your previous medications? : Yes (has not tried atarax today. Can't relate why. Encouraged to try this as needed as she is noted to have told inpatient providers it helped her)  Does the patient have all of their medications?: Yes  Do you have questions regarding any of your medications? : No  Do you have all of your needed medical supplies or equipment (DME)?  (i.e. oxygen tank, CPAP, " "cane, etc.):  (NA)  Discharge follow-up appointment scheduled within 14 calendar days? : No (7- with PCP. has weekly visits with therapist. States she has a psychiatry appointment scheduled but can't find her calendar to confirm date)  Is patient agreeable to assistance with scheduling? : No         Post-op (Clinicians Only)  Did the patient have surgery or a procedure: No    Patient notes childhood trauma of not being socialized as a child until she was in her 20's. Feels that people that she meets don't understand that she has a memory deficit and that they expect her to remember things, to be able to process a lot of information accurately. She is not able to explain statement fully, but does feel neighbors and schoolmates may treat her differently or dismiss her. She agrees to continue to meet with her therapist. Has used crisis phone number in the past and understands how to use this again if needed. She states she is happy today, sitting in her room and going \"on the computer\". She is cleaning her house \" a little at a time\" so she feels \"productive\". Misses her daughter who will return 7-10 from [place].     PLAN                                                      Outpatient Plan:  Keep follow up appointments as scheduled. Use mental health crisis phone line if needed/distressed.     Future Appointments   Date Time Provider Department Center   7/14/2022  2:20 PM Shirley Freeman APRN CNP [facility]   7/22/2022 10:30 AM Rudy Shin MD [facility]   8/2/2022  8:30 AM Mary Mann DO [facility]   8/11/2022  1:30 PM Krishna Olivia MD [facility] [place]   8/30/2022  8:45 [facility]   8/30/2022 12:30 PM France Kingsley [facility]         For any urgent concerns, please contact our 24 hour nurse triage line: [phone] ([phone])         Simona Mirza RN  [facility]                  "

## 2022-06-23 NOTE — PROGRESS NOTES
Clinic Care Coordination Contact  [facility]/Voicemail     Clinical Data: Care Coordinator Outreach - TCM      Outreach attempted x 1. Spoke with patient but there is poor connection with her phone. Patient agrees for me to attempt again later this afternoon.    Laquita Mirza RN  [facility] Care Resource [facility]

## 2022-06-23 NOTE — PROGRESS NOTES
Speech Therapy Evaluation  06/09/22   General Information   Type of Evaluation Cognitive-Linguistic   Type Of Visit Initial   Start Of Care Date 06/09/22   Referring Physician PHILL Luo CNP   Orders Evaluate And Treat   Medical Diagnosis Speech problem (R47.9)   Precautions/Limitations  no known precautions/limitations   Hearing no concerns   Surgical/Medical history reviewed Yes   Pertinent History Of Current Problem Patient is referred to speech therapy for a speech problem with unclear etiology whether primary, cognitive, or mental health. She has Bipolar with schizoaffective disorder. Patient reports she second guesses how to say things, repeats herself and sometimes stutters. Patient reports working hard on her mental health going to 2 appointments a week. Her reports are often filled with general language instead of specific details. She does not make eye contact. She reports she has a good family. She reports wishing she was less talkative and could slow down.   Current Community Support  Other/Comments  (mental health services)   Patient Role/employment History Disabled   Living environment Other, comments  (subsidized housing)   General Observations Patient reports she feels upset upon arrival. She attends evaluation independently and slowly calms throughout evaluation. She does not make eye contact during eval.   Patient/family Goals To slow down and be able to communicate better   Speech   Deficits in Phonation None   Deficits in Articulation None   Deficits in Resonance None   Deficits in Prosody None   Speech Comments No stuttering observed. Patient is 100% intelligible during conversation.   Language: Auditory Comprehension (understanding of spoken language)   Comments (Auditory Comprehension) No concerns. Patient participates in conversation, answers open ended questions appropriately, and follows complex directions.   Language: Verbal Expression (use of spoken language to express  information)   Comments (Verbal Expression) Patient uses vague language with mild lack of structure during her stories. Mild word finding deficits identified with extended wait during confrontational naming task and producing 10 items in a concrete category in a minute.   Pragmatics (the social or functional use of a language)   Deficits noted in Nonverbal Eye Contact   Deficits noted in Conversational Skills Turn-taking;Verbosity   Deficits noted in the Organization of Narrative; RICE Disorganized;Completeness   Cognitive Status Examination   Attention impaired   Behavioral Observations WFL   Short Term Memory impaired   Long Term Memory impaired   Cognitive Status Exam Comments Repeatable Battery for the Assessment of Neuropsychological Status Update   (RBANS  Update)  The Repeatable Battery for the Assessment of Neuropsychological Status Update (RBANS  Update) was administered to Maddy Ortiz on 6/23/2022.  The RBANS Update was originally developed with a primary focus on assessment of dementia, and measures cognitive decline or improvement across these domains: immediate memory, visuospatial/constructional; language; attention; and delayed memory.  However, special group studies are available for Alzheimer's Disease, Vascular Dementia, HIV Dementia, Mars's Disease, Parkinson's Disease, Depression, Schizophrenia, and Closed Head Injury.   The RBANS can be used by clinicians and neuropsychologists to help screen for deficits in acute-care settings; track recovery during rehabilitation; track progression of neurological disorders; and screen for neurocognitive status in adolescents.  This test is normed for ages 12-89.  The subtest normative data are presented in scaled score units that have a mean of 10 and a standard deviation of 3.          Total Score Scaled Score  Percentile Group       I.  Immediate memory    1.  List Learning   24     2.  Story Memory   12    Immediate Memory Index Score  = 78    II.   Visuospatial/Constructional    3.  Figure copy   20    4.  Line Orientation   20       Visuospatial/Constructional Index Score  = 126    III.  Language     5.  Picture Naming   9       6.  Semantic Fluency   10    Language Index Score = 80    IV.  Attention  7.  Digit Span     9    8.  Coding     10    Attention Index Score = 75    V.  Delayed Memory  9.  List recall    3       10. List Recognition   19       11. Story Recall   8    12. Figure Recall   17    Delayed Memory Index Score = 98  Sum of Total Scores for Subtest 9+11+12 28    Sum of Index Scores = 457  Total Scale Score = 87      INTERPRETATION OF TEST RESULTS: Patient demonstrates deficits in immediate memory and attention. Overall she scores in the 19th percentile for her age in cognitive/linguistic skills.    TIME ADMINISTERING TEST: 25  TIME FOR INTERPRETATION AND PREPARATION OF REPORT: 25  TOTAL TIME: 50    Repeatable Battery for the Assessment of Neuropsychological Status Update (RBANS Update). Copyright   2012 [facility] [employer] Adapted and reproduced with permission. All rights reserved.         General Therapy Interventions   Planned Therapy Interventions Communication   Intervention Comments pragmatic skills and cognitive/linguistic skills   Clinical Impression, SLP Eval   Criteria for Skilled Therapeutic Interventions Met (SLP Eval) Yes, treatment indicated   SLP Diagnosis cognitive/linguistic deficits; pragmatic deficits   Functional limitations due to impairments Patient demonstrates difficulty communicating effectively with others which contributes to her stress and mental health issues   Therapy Frequency 1 time;per week   Predicted Duration of Therapy Intervention (days/wks) 12 weeks   Risks and Benefits of Treatment have been explained. Yes   Patient, Family & other staff in agreement with plan of care Yes   Clinical Impression Comments Patient presents with mild cognitive/linguistic deficits and deficits with pragmatic skills such as  conversational turn taking and story organization. Patient reports she would like to work on memory in the future but wants to work on communication first.  She demonstrates deficits in short term memory and attention for her age. Recommend skilled speech therapy to address these concerns.   Cognitive/Communication Goals   Cognitive/Communication Goals 1;2;3;4   Cognitive/Communication Goal 1   Goal Identifier Rate of speech   Goal Description Patient will reduce rate of speech to appropriate to allow for thought organization with min visual cues across 2 sessions.   Target Date 07/09/22   Cognitive/Communication Goal 2   Goal Identifier Turn Taking   Goal Description Patient will allow for appropriate turn taking during a semi-structured conversation given min visual cues across 2 sessions.   Target Date 07/09/22   Cognitive/Communication Goal 3   Goal Identifier Organization   Goal Description Patient will organize stories to include necessary details of who, what, why, when given mod visual cues across 2 sessions.   Target Date 07/09/22   Cognitive/Communication Goal 4   Goal Identifier Communicative repairs   Goal Description Patient will verbalize understanding of 3 strategies to use to repair communication when she percieves herself or her communication partner to be misunderstanding the other.   Target Date 07/09/22   Total Session Time   Total Evaluation Time 60   Therapy Certification   Certification date from 06/09/22   Certification date to 07/09/22   Medical Diagnosis Speech problem (R47.9)   Certification I certify the need for these services furnished under this plan of treatment and while under my care.  (Physician co-signature of this document indicates review and certification of the therapy plan).

## 2022-06-23 NOTE — PROGRESS NOTES
[facility]          OUTPATIENT SPEECH LANGUAGE PATHOLOGY LANGUAGE-COGNITION  EVALUATION  PLAN OF TREATMENT FOR OUTPATIENT REHABILITATION  (COMPLETE FOR INITIAL CLAIMS ONLY)  Patient's Last Name, First Name, M.I.  YOB: 1985  AngelMaddy GREGG                        Provider s Name: [facility] Medical Record No.  [id number]     Onset Date:   6/1/22 (order's date)   Start of Care Date: 06/09/22   Type:     ___PT  __OT   _X_SLP    Medical Diagnosis:  Speech problem (R47.9)   Speech Language Pathology Diagnosis:  cognitive/linguistic deficits; pragmatic deficits    Visits from SOC: 1                                        ________________________________________________________________________________  Plan of Treatment/Functional Goals:   Planned Therapy Interventions: Communication   Intervention Comments: pragmatic skills and cognitive/linguistic skills    Communication Goals   1. Goal Identifier: Rate of speech       Goal Description: Patient will reduce rate of speech to appropriate to allow for thought organization with min visual cues across 2 sessions.       Target Date: 07/09/22   2. Goal Identifier: Turn Taking       Goal Description: Patient will allow for appropriate turn taking during a semi-structured conversation given min visual cues across 2 sessions.       Target Date: 07/09/22   3. Goal Identifier: Organization       Goal Description: Patient will organize stories to include necessary details of who, what, why, when given mod visual cues across 2 sessions.       Target Date: 07/09/22   4. Goal Identifier: Communicative repairs       Goal Description: Patient will verbalize understanding of 3 strategies to use to repair communication when she percieves herself or her communication partner to be misunderstanding the other.       Target Date: 07/09/22        Predicted Duration of Therapy Intervention (days/wks): 12 weeks    Devora Kan, SLP       I CERTIFY THE NEED FOR THESE SERVICES FURNISHED UNDER        THIS PLAN OF TREATMENT AND WHILE UNDER MY CARE     (Physician co-signature of this document indicates review and certification of the therapy plan).                  Certification Date From:  06/09/22  Certification Date To:   07/09/22          Referring Physician:  PHILL Luo CNP    Initial Assessment        See Epic Evaluation Start Of Care Date: 06/09/22

## 2022-06-27 ENCOUNTER — NURSE TRIAGE (OUTPATIENT)
Dept: NURSING | Facility: CLINIC | Age: 37
End: 2022-06-27

## 2022-06-27 NOTE — TELEPHONE ENCOUNTER
Shirley,    Patient is reached and she stopped the metformin after the 6/1/22 office visit as discussed with you.  She is still using the victoza with elevated BS of 174-300.  Per patient her stomach issues are improved less symptoms with out the metformin.  Please advise. Nicci PONCE RN    Office notes from 6/1/22  Type 2 diabetes mellitus without complication, without long-term current use of insulin (H)  Patient would like to discontinue to metformin altogether to see if this helps her stomach issues.  She may discontinue for now.  Continue the Victoza.  Recheck in 3 months.

## 2022-06-27 NOTE — TELEPHONE ENCOUNTER
Blood sugar this morning is 174 and states that she stopped taking the metformin as per Shirley Freeman advised her to do.  Patient is wanting to see if she try something else instead of Metformin as she had side effects from this.  Patient is requesting to speak with Shirley Freeman CNP.  Please phone patient within two hours.      Nurse Triage SBAR    Is this a 2nd Level Triage? YES, LICENSED PRACTITIONER REVIEW IS REQUIRED    Situation:   Blood Sugar    Background:   Patient has been taking metformin and was told to discontinue due to side effects.    Assessment:   Today patient feels that her blood sugars are high.  Currently blood sugar this am is 174.      Protocol Recommended Disposition:   Call PCP Within 24 Hours    Recommendation:   Patient is requesting to speak with Shirley Cruz.  Clinic please phone patient within 2 hours.     Routed to provider    Does the patient meet one of the following criteria for ADS visit consideration? 16+ years old, with an [facility] PCP     TIP  Providers, please consider if this condition is appropriate for management at one of our Acute and Diagnostic Services sites.     If patient is a good candidate, please use dotphrase <dot>triageresponse and select Refer to ADS to document.    COVID 19 Nurse Triage Plan/Patient Instructions    Please be aware that novel coronavirus (COVID-19) may be circulating in the community. If you develop symptoms such as fever, cough, or SOB or if you have concerns about the presence of another infection including coronavirus (COVID-19), please contact your health care provider or visit [email or web address].     Disposition/Instructions    Home care recommended. Follow home care protocol based instructions.    Thank you for taking steps to prevent the spread of this virus.  o Limit your contact with others.  o Wear a simple mask to cover your cough.  o Wash your hands well and often.    Resources     [facility]: About  COVID-19: [email or web address]    CDC: What to Do If You're Sick: www.cdc.gov/coronavirus/2019-ncov/about/steps-when-sick.html    CDC: Ending Home Isolation: www.cdc.gov/coronavirus/2019-ncov/hcp/disposition-in-home-patients.html     CDC: Caring for Someone: www.cdc.gov/coronavirus/2019-ncov/if-you-are-sick/care-for-someone.html     [facility]: Interim Guidance for Hospital Discharge to Home: www.health.[facility].[place]/diseases/coronavirus/hcp/hospdischarge.pdf    [facility] clinical trials (COVID-19 research studies): [email or web address]-clinical-trials     Below are the COVID-19 hotlines at the [place] [facility] ([facility]). Interpreters are available.   o For health questions: Call 053-983-3358 or 1-496.933.1440 (7 a.m. to 7 p.m.)  o For questions about schools and childcare: Call 873-599-6813 or 1-391.750.5506 (7 a.m. to 7 p.m.)                       Reason for Disposition    [1] Caller has NON-URGENT medication or insulin pump question AND [2] triager unable to answer question    Additional Information    Negative: Unconscious or difficult to awaken    Negative: Acting confused (e.g., disoriented, slurred speech)    Negative: Very weak (e.g., can't stand)    Negative: Sounds like a life-threatening emergency to the triager    Negative: [1] Vomiting AND [2] signs of dehydration (e.g., very dry mouth, lightheaded, dark urine)    Negative: [1] Blood glucose > 240 mg/dL (13.3 mmol/L) AND [2] rapid breathing    Negative: Blood glucose > 500 mg/dL (27.8 mmol/L)    Negative: [1] Blood glucose > 240 mg/dL (13.3 mmol/L) AND [2] urine ketones moderate-large (or more than 1+)    Negative: [1] Blood glucose > 240 mg/dL (13.3 mmol/L) AND [2] blood ketones > 1.4 mmol/L    Negative: [1] Blood glucose > 240 mg/dL (13.3 mmol/L) AND [2] vomiting AND [3] unable to check for ketones (in blood or urine)    Negative: [1] New onset diabetes suspected (e.g., frequent urination, weak, weight loss) AND [2]  vomiting or rapid breathing    Negative: Vomiting lasts > 4 hours    Negative: Patient sounds very sick or weak to the triager    Negative: Fever > 100.4 F (38.0 C)    Negative: Blood glucose > 400 mg/dL (22.2 mmol/L)    Negative: [1] Blood glucose > 300 mg/dL (16.7 mmol/L) AND [2] two or more times in a row    Negative: Urine ketones moderate - large (or blood ketones > 1.4 mmol/L)    Negative: [1] Caller has URGENT medication or insulin pump question AND [2] triager unable to answer question    Negative: [1] Symptoms of high blood sugar (e.g., frequent urination, weak, weight loss) AND [2] not able to test blood glucose    Negative: New onset diabetes suspected (e.g., frequent urination, weakness, weight loss)    Protocols used: DIABETES - HIGH BLOOD SUGAR-A-

## 2022-06-28 RX ORDER — METFORMIN HCL 500 MG
TABLET, EXTENDED RELEASE 24 HR ORAL
Qty: 60 TABLET | Refills: 1 | OUTPATIENT
Start: 2022-06-28

## 2022-06-30 ENCOUNTER — OFFICE VISIT (OUTPATIENT)
Dept: FAMILY MEDICINE | Facility: CLINIC | Age: 37
End: 2022-06-30
Payer: MEDICARE

## 2022-06-30 VITALS
HEIGHT: 63 IN | DIASTOLIC BLOOD PRESSURE: 74 MMHG | SYSTOLIC BLOOD PRESSURE: 108 MMHG | BODY MASS INDEX: 43.94 KG/M2 | RESPIRATION RATE: 20 BRPM | WEIGHT: 248 LBS | HEART RATE: 89 BPM | OXYGEN SATURATION: 97 %

## 2022-06-30 DIAGNOSIS — F25.0 SCHIZOAFFECTIVE DISORDER, BIPOLAR TYPE (H): ICD-10-CM

## 2022-06-30 DIAGNOSIS — Z09 HOSPITAL DISCHARGE FOLLOW-UP: ICD-10-CM

## 2022-06-30 DIAGNOSIS — E11.9 TYPE 2 DIABETES MELLITUS WITHOUT COMPLICATION, WITHOUT LONG-TERM CURRENT USE OF INSULIN (H): Primary | ICD-10-CM

## 2022-06-30 LAB
ANION GAP SERPL CALCULATED.3IONS-SCNC: 6 MMOL/L (ref 3–14)
BUN SERPL-MCNC: 16 MG/DL (ref 7–30)
CALCIUM SERPL-MCNC: 8.9 MG/DL (ref 8.5–10.1)
CHLORIDE BLD-SCNC: 107 MMOL/L (ref 94–109)
CO2 SERPL-SCNC: 25 MMOL/L (ref 20–32)
CREAT SERPL-MCNC: 0.62 MG/DL (ref 0.52–1.04)
ERYTHROCYTE [DISTWIDTH] IN BLOOD BY AUTOMATED COUNT: 12.6 % (ref 10–15)
GFR SERPL CREATININE-BSD FRML MDRD: >90 ML/MIN/1.73M2
GLUCOSE BLD-MCNC: 262 MG/DL (ref 70–99)
HBA1C MFR BLD: 6.4 % (ref 0–5.6)
HCT VFR BLD AUTO: 33.3 % (ref 35–47)
HGB BLD-MCNC: 10.9 G/DL (ref 11.7–15.7)
LITHIUM SERPL-SCNC: 0.7 MMOL/L
MCH RBC QN AUTO: 30.9 PG (ref 26.5–33)
MCHC RBC AUTO-ENTMCNC: 32.7 G/DL (ref 31.5–36.5)
MCV RBC AUTO: 94 FL (ref 78–100)
PLATELET # BLD AUTO: 137 10E3/UL (ref 150–450)
POTASSIUM BLD-SCNC: 3.9 MMOL/L (ref 3.4–5.3)
RBC # BLD AUTO: 3.53 10E6/UL (ref 3.8–5.2)
SODIUM SERPL-SCNC: 138 MMOL/L (ref 133–144)
WBC # BLD AUTO: 8.4 10E3/UL (ref 4–11)

## 2022-06-30 PROCEDURE — 80048 BASIC METABOLIC PNL TOTAL CA: CPT | Performed by: NURSE PRACTITIONER

## 2022-06-30 PROCEDURE — 85027 COMPLETE CBC AUTOMATED: CPT | Performed by: NURSE PRACTITIONER

## 2022-06-30 PROCEDURE — 83036 HEMOGLOBIN GLYCOSYLATED A1C: CPT | Performed by: NURSE PRACTITIONER

## 2022-06-30 PROCEDURE — 99495 TRANSJ CARE MGMT MOD F2F 14D: CPT | Performed by: NURSE PRACTITIONER

## 2022-06-30 PROCEDURE — 80178 ASSAY OF LITHIUM: CPT | Performed by: NURSE PRACTITIONER

## 2022-06-30 PROCEDURE — 36415 COLL VENOUS BLD VENIPUNCTURE: CPT | Performed by: NURSE PRACTITIONER

## 2022-06-30 ASSESSMENT — PATIENT HEALTH QUESTIONNAIRE - PHQ9
10. IF YOU CHECKED OFF ANY PROBLEMS, HOW DIFFICULT HAVE THESE PROBLEMS MADE IT FOR YOU TO DO YOUR WORK, TAKE CARE OF THINGS AT HOME, OR GET ALONG WITH OTHER PEOPLE: EXTREMELY DIFFICULT
SUM OF ALL RESPONSES TO PHQ QUESTIONS 1-9: 17
SUM OF ALL RESPONSES TO PHQ QUESTIONS 1-9: 17

## 2022-06-30 NOTE — PROGRESS NOTES
Assessment & Plan     Type 2 diabetes mellitus without complication, without long-term current use of insulin (H)  A1c is well controlled.  Patient notes increased blood sugars based on poor dietary choices.  Is unclear if lethargy in clinic today was a result of her poor food choices this morning or if it was related to her mental health medications.  We discussed avoiding all sugary drinks in the future.  I will contact the diabetic educator to reach out to her again.  Metformin was stopped while hospitalized due to GI side effects.  For now patient should continue liraglutide.  - Glucose; Future  - CBC with platelets; Future  - Basic metabolic panel  (Ca, Cl, CO2, Creat, Gluc, K, Na, BUN); Future  - Basic metabolic panel  (Ca, Cl, CO2, Creat, Gluc, K, Na, BUN)  - CBC with platelets  - Hemoglobin A1c    Schizoaffective disorder, bipolar type (H)  Recent hospitalization for lithium overdose.  Medications were adjusted while hospitalized by psychiatry.  Lithium levels obtained today due to somnolence in clinic.  Levels were normal.  Unclear if this episode was related to her medications or blood sugar issue.  However patient recovered quickly within 15 minutes.  Patient to continue to follow-up with psychiatry.  - Lithium level; Future  - Lithium level        The risks, benefits and treatment options of prescribed medications or other treatments have been discussed with the patient. The patient verbalized their understanding and should call or follow up if no improvement or if they develop further problems.    PHILL Luo [facility]            Terry Castañeda is a 36 year old, presenting for the following health issues:  Diabetes      History of Present Illness       Diabetes:   She presents for follow up of diabetes.  She is checking home blood glucose one time daily. She checks blood glucose before and after meals.  Blood glucose is sometimes over 200 and never under 70.  "When her blood glucose is low, the patient is asymptomatic for confusion, blurred vision, lethargy and reports not feeling dizzy, shaky, or weak.  She is concerned about blood sugar frequently over 200. She is having excessive thirst. The patient has not had a diabetic eye exam in the last 12 months.         She eats 2-3 servings of fruits and vegetables daily.She consumes 0 sweetened beverage(s) daily.She exercises with enough effort to increase her heart rate 9 or less minutes per day.  She exercises with enough effort to increase her heart rate 3 or less days per week.   She is taking medications regularly.    Today's PHQ-9         PHQ-9 Total Score: 17    PHQ-9 Q9 Thoughts of better off dead/self-harm past 2 weeks :   Several days  Thoughts of suicide or self harm: (P) No  Self-harm Plan:     Self-harm Action:       Safety concerns for self or others: (P) No    How difficult have these problems made it for you to do your work, take care of things at home, or get along with other people: Extremely difficult         Post Discharge Outreach 6/23/2022   Admission Date 6/17/2022   Reason for Admission [place] overdose   Discharge Date 6/22/2022   Discharge Diagnosis Bipolar 1 disorder  Schizoaffective disorder   lithium overdose apparently took  In attempt to treat her psychosis  and also ? If injected risperidone as well    Increased sedation  T2DM  Moderate persistent asthma   Hepatic steatosis  Morbid obesity   How are you doing now that you are home? \"Better\", but at times feels terrified, angry and states she is relaxing and isolating in her room   How are your symptoms? (Red Flag symptoms escalate to triage hotline per guidelines) Improved   Do you feel your condition is stable enough to be safe at home until your provider visit? Yes   Does the patient have their discharge instructions?  Unknown   Does the patient have questions regarding their discharge instructions?  -   Were you started on any new " medications or were there changes to any of your previous medications?  Yes   Does the patient have all of their medications? Yes   Do you have questions regarding any of your medications?  No   Do you have all of your needed medical supplies or equipment (DME)?  (i.e. oxygen tank, CPAP, cane, etc.) (No Data)   Discharge follow-up appointment scheduled within 14 calendar days?  No   Discharge Follow Up Appointment Date -   Discharge Follow Up Appointment Scheduled with? -     Hospital Follow-up Visit:    Hospital/Nursing Home/ Rehab Facility: [facility]  Date of Admission: 06/17/22  Date of Discharge: 06/22/22  Reason(s) for Admission: Schizoaffective disorder, bipolar type (H)  Overdose, undetermined intent, initial encounter    Was your hospitalization related to COVID-19? No   Problems taking medications regularly:  None  Medication changes since discharge: None  Problems adhering to non-medication therapy:  None    Summary of hospitalization:  [facility] discharge summary reviewed  Diagnostic Tests/Treatments reviewed.  Follow up needed: none  Other Healthcare Providers Involved in Patient s Care:         None  Update since discharge: stable.       Post Discharge Medication Reconciliation: discharge medications reconciled, continue medications without change.  Plan of care communicated with patient      Fatigue:  When patient arrived to clinic, she appeared tired and lethargic.  She started falling asleep in the chair in the exam room while the MA was trying to room her.  We had to move her to the exam bed for her to lay down.  She continued to fall asleep in between talking to us.  Vital signs were stable during this period of time.  Stat labs were obtained.  When we were able to wake patient up she was answering questions clearly and appropriately.  Patient had taken all of her medications this morning as prescribed and reports taking them at the  "current doses that are listed in her discharge summary.  Prior to coming to clinic, she went to [employer] for breakfast and had a large sugary drink with her food.  After 15 or 20 minutes, we were able to sit patient up and have her drink some water.  She then stated that she was feeling better and moved to the chair in the exam room.  She then had her eyes open and was communicating clearly.  At the end of the visit, we offered wheelchair ride out to her ride but she declined stating she was fine.  She got up and walked out of clinic without any difficulty.  She was not driving today; a friend gave her a ride.      Review of Systems   Constitutional, HEENT, cardiovascular, pulmonary, GI, , musculoskeletal, neuro, skin, endocrine and psych systems are negative, except as otherwise noted.      Objective    /74   Pulse 89   Resp 20   Ht 1.6 m (5' 3\")   Wt 112.5 kg (248 lb)   LMP 06/17/2022 (Approximate)   SpO2 97%   BMI 43.93 kg/m    Body mass index is 43.93 kg/m .  Physical Exam   GENERAL: healthy, alert and no distress  EYES: Eyes grossly normal to inspection, PERRL and conjunctivae and sclerae normal  HENT: ear canals and TM's normal, nose and mouth without ulcers or lesions  NECK: no adenopathy, no asymmetry, masses, or scars and thyroid normal to palpation  RESP: lungs clear to auscultation - no rales, rhonchi or wheezes  CV: regular rate and rhythm, normal S1 S2, no S3 or S4, no murmur, click or rub, no peripheral edema and peripheral pulses strong  ABDOMEN: soft, nontender, no hepatosplenomegaly, no masses and bowel sounds normal  MS: no gross musculoskeletal defects noted, no edema  NEURO: Normal strength and tone, mentation intact and speech normal  PSYCH: mentation appears normal, affect normal/bright    Results for orders placed or performed in visit on 06/30/22 (from the past 24 hour(s))   Basic metabolic panel  (Ca, Cl, CO2, Creat, Gluc, K, Na, BUN)   Result Value Ref Range    Sodium 138 " 133 - 144 mmol/L    Potassium 3.9 3.4 - 5.3 mmol/L    Chloride 107 94 - 109 mmol/L    Carbon Dioxide (CO2) 25 20 - 32 mmol/L    Anion Gap 6 3 - 14 mmol/L    Urea Nitrogen 16 7 - 30 mg/dL    Creatinine 0.62 0.52 - 1.04 mg/dL    Calcium 8.9 8.5 - 10.1 mg/dL    Glucose 262 (H) 70 - 99 mg/dL    GFR Estimate >90 >60 mL/min/1.73m2   CBC with platelets   Result Value Ref Range    WBC Count 8.4 4.0 - 11.0 10e3/uL    RBC Count 3.53 (L) 3.80 - 5.20 10e6/uL    Hemoglobin 10.9 (L) 11.7 - 15.7 g/dL    Hematocrit 33.3 (L) 35.0 - 47.0 %    MCV 94 78 - 100 fL    MCH 30.9 26.5 - 33.0 pg    MCHC 32.7 31.5 - 36.5 g/dL    RDW 12.6 10.0 - 15.0 %    Platelet Count 137 (L) 150 - 450 10e3/uL   Lithium level   Result Value Ref Range    Lithium 0.7   mmol/L   Hemoglobin A1c   Result Value Ref Range    Hemoglobin A1C 6.4 (H) 0.0 - 5.6 %                   .  ..

## 2022-07-03 ENCOUNTER — NURSE TRIAGE (OUTPATIENT)
Dept: NURSING | Facility: CLINIC | Age: 37
End: 2022-07-03

## 2022-07-03 ENCOUNTER — HOSPITAL ENCOUNTER (EMERGENCY)
Facility: CLINIC | Age: 37
Discharge: HOME | End: 2022-07-04
Attending: EMERGENCY MEDICINE | Admitting: EMERGENCY MEDICINE
Payer: MEDICARE

## 2022-07-03 ENCOUNTER — APPOINTMENT (OUTPATIENT)
Dept: CT IMAGING | Facility: CLINIC | Age: 37
End: 2022-07-03
Attending: FAMILY MEDICINE
Payer: MEDICARE

## 2022-07-03 DIAGNOSIS — R93.5 ABNORMAL CT OF THE ABDOMEN: ICD-10-CM

## 2022-07-03 DIAGNOSIS — K42.9 PARAUMBILICAL HERNIA: ICD-10-CM

## 2022-07-03 LAB
ALBUMIN SERPL-MCNC: 3.3 G/DL (ref 3.4–5)
ALBUMIN UR-MCNC: NEGATIVE MG/DL
ALP SERPL-CCNC: 77 U/L (ref 40–150)
ALT SERPL W P-5'-P-CCNC: 108 U/L (ref 0–50)
ANION GAP SERPL CALCULATED.3IONS-SCNC: 7 MMOL/L (ref 3–14)
APPEARANCE UR: ABNORMAL
AST SERPL W P-5'-P-CCNC: 103 U/L (ref 0–45)
BASOPHILS # BLD AUTO: 0.1 10E3/UL (ref 0–0.2)
BASOPHILS NFR BLD AUTO: 1 %
BILIRUB SERPL-MCNC: 0.4 MG/DL (ref 0.2–1.3)
BILIRUB UR QL STRIP: NEGATIVE
BUN SERPL-MCNC: 11 MG/DL (ref 7–30)
CALCIUM SERPL-MCNC: 9.1 MG/DL (ref 8.5–10.1)
CHLORIDE BLD-SCNC: 108 MMOL/L (ref 94–109)
CO2 SERPL-SCNC: 24 MMOL/L (ref 20–32)
COLOR UR AUTO: YELLOW
CREAT SERPL-MCNC: 0.59 MG/DL (ref 0.52–1.04)
EOSINOPHIL # BLD AUTO: 0.3 10E3/UL (ref 0–0.7)
EOSINOPHIL NFR BLD AUTO: 3 %
ERYTHROCYTE [DISTWIDTH] IN BLOOD BY AUTOMATED COUNT: 12.9 % (ref 10–15)
GFR SERPL CREATININE-BSD FRML MDRD: >90 ML/MIN/1.73M2
GLUCOSE BLD-MCNC: 241 MG/DL (ref 70–99)
GLUCOSE UR STRIP-MCNC: NEGATIVE MG/DL
HCG SERPL QL: NEGATIVE
HCT VFR BLD AUTO: 31.7 % (ref 35–47)
HGB BLD-MCNC: 10.5 G/DL (ref 11.7–15.7)
HGB UR QL STRIP: NEGATIVE
HOLD SPECIMEN: NORMAL
IMM GRANULOCYTES # BLD: 0.1 10E3/UL
IMM GRANULOCYTES NFR BLD: 1 %
KETONES UR STRIP-MCNC: NEGATIVE MG/DL
LACTATE SERPL-SCNC: 1 MMOL/L (ref 0.7–2)
LACTATE SERPL-SCNC: 2.4 MMOL/L (ref 0.7–2)
LEUKOCYTE ESTERASE UR QL STRIP: ABNORMAL
LIPASE SERPL-CCNC: 181 U/L (ref 73–393)
LYMPHOCYTES # BLD AUTO: 1.5 10E3/UL (ref 0.8–5.3)
LYMPHOCYTES NFR BLD AUTO: 20 %
MCH RBC QN AUTO: 30.6 PG (ref 26.5–33)
MCHC RBC AUTO-ENTMCNC: 33.1 G/DL (ref 31.5–36.5)
MCV RBC AUTO: 92 FL (ref 78–100)
MONOCYTES # BLD AUTO: 0.5 10E3/UL (ref 0–1.3)
MONOCYTES NFR BLD AUTO: 7 %
MUCOUS THREADS #/AREA URNS LPF: PRESENT /LPF
NEUTROPHILS # BLD AUTO: 5.3 10E3/UL (ref 1.6–8.3)
NEUTROPHILS NFR BLD AUTO: 68 %
NITRATE UR QL: NEGATIVE
NRBC # BLD AUTO: 0 10E3/UL
NRBC BLD AUTO-RTO: 0 /100
PH UR STRIP: 6 [PH] (ref 5–7)
PLATELET # BLD AUTO: 130 10E3/UL (ref 150–450)
POTASSIUM BLD-SCNC: 4.3 MMOL/L (ref 3.4–5.3)
PROT SERPL-MCNC: 7.1 G/DL (ref 6.8–8.8)
RBC # BLD AUTO: 3.43 10E6/UL (ref 3.8–5.2)
RBC URINE: 8 /HPF
SODIUM SERPL-SCNC: 139 MMOL/L (ref 133–144)
SP GR UR STRIP: 1.02 (ref 1–1.03)
SQUAMOUS EPITHELIAL: 6 /HPF
UROBILINOGEN UR STRIP-MCNC: NORMAL MG/DL
WBC # BLD AUTO: 7.7 10E3/UL (ref 4–11)
WBC URINE: 30 /HPF

## 2022-07-03 PROCEDURE — 83605 ASSAY OF LACTIC ACID: CPT | Performed by: FAMILY MEDICINE

## 2022-07-03 PROCEDURE — 258N000003 HC RX IP 258 OP 636: Performed by: FAMILY MEDICINE

## 2022-07-03 PROCEDURE — 81001 URINALYSIS AUTO W/SCOPE: CPT | Performed by: FAMILY MEDICINE

## 2022-07-03 PROCEDURE — 84703 CHORIONIC GONADOTROPIN ASSAY: CPT | Performed by: FAMILY MEDICINE

## 2022-07-03 PROCEDURE — 99285 EMERGENCY DEPT VISIT HI MDM: CPT | Performed by: EMERGENCY MEDICINE

## 2022-07-03 PROCEDURE — 96361 HYDRATE IV INFUSION ADD-ON: CPT

## 2022-07-03 PROCEDURE — 83690 ASSAY OF LIPASE: CPT | Performed by: FAMILY MEDICINE

## 2022-07-03 PROCEDURE — 99285 EMERGENCY DEPT VISIT HI MDM: CPT | Mod: 25

## 2022-07-03 PROCEDURE — 36415 COLL VENOUS BLD VENIPUNCTURE: CPT | Performed by: EMERGENCY MEDICINE

## 2022-07-03 PROCEDURE — 250N000009 HC RX 250: Performed by: EMERGENCY MEDICINE

## 2022-07-03 PROCEDURE — 96374 THER/PROPH/DIAG INJ IV PUSH: CPT | Mod: 25

## 2022-07-03 PROCEDURE — 250N000011 HC RX IP 250 OP 636: Performed by: FAMILY MEDICINE

## 2022-07-03 PROCEDURE — G1010 CDSM STANSON: HCPCS

## 2022-07-03 PROCEDURE — 83605 ASSAY OF LACTIC ACID: CPT | Performed by: EMERGENCY MEDICINE

## 2022-07-03 PROCEDURE — 85025 COMPLETE CBC W/AUTO DIFF WBC: CPT | Performed by: FAMILY MEDICINE

## 2022-07-03 PROCEDURE — 87086 URINE CULTURE/COLONY COUNT: CPT | Performed by: FAMILY MEDICINE

## 2022-07-03 PROCEDURE — 36415 COLL VENOUS BLD VENIPUNCTURE: CPT | Performed by: FAMILY MEDICINE

## 2022-07-03 PROCEDURE — 96375 TX/PRO/DX INJ NEW DRUG ADDON: CPT

## 2022-07-03 PROCEDURE — 80053 COMPREHEN METABOLIC PANEL: CPT | Performed by: FAMILY MEDICINE

## 2022-07-03 PROCEDURE — 250N000011 HC RX IP 250 OP 636: Performed by: EMERGENCY MEDICINE

## 2022-07-03 RX ORDER — KETOROLAC TROMETHAMINE 15 MG/ML
15 INJECTION, SOLUTION INTRAMUSCULAR; INTRAVENOUS ONCE
Status: COMPLETED | OUTPATIENT
Start: 2022-07-03 | End: 2022-07-03

## 2022-07-03 RX ORDER — SODIUM CHLORIDE 9 MG/ML
1000 INJECTION, SOLUTION INTRAVENOUS CONTINUOUS
Status: DISCONTINUED | OUTPATIENT
Start: 2022-07-03 | End: 2022-07-04 | Stop reason: HOSPADM

## 2022-07-03 RX ORDER — HYDROMORPHONE HYDROCHLORIDE 1 MG/ML
0.5 INJECTION, SOLUTION INTRAMUSCULAR; INTRAVENOUS; SUBCUTANEOUS EVERY 30 MIN PRN
Status: DISCONTINUED | OUTPATIENT
Start: 2022-07-03 | End: 2022-07-04 | Stop reason: HOSPADM

## 2022-07-03 RX ORDER — IOPAMIDOL 755 MG/ML
100 INJECTION, SOLUTION INTRAVASCULAR ONCE
Status: COMPLETED | OUTPATIENT
Start: 2022-07-03 | End: 2022-07-03

## 2022-07-03 RX ADMIN — SODIUM CHLORIDE 1000 ML: 9 INJECTION, SOLUTION INTRAVENOUS at 23:15

## 2022-07-03 RX ADMIN — IOPAMIDOL 100 ML: 755 INJECTION, SOLUTION INTRAVENOUS at 22:41

## 2022-07-03 RX ADMIN — KETOROLAC TROMETHAMINE 15 MG: 15 INJECTION, SOLUTION INTRAMUSCULAR; INTRAVENOUS at 21:41

## 2022-07-03 RX ADMIN — HYDROMORPHONE HYDROCHLORIDE 0.5 MG: 1 INJECTION, SOLUTION INTRAMUSCULAR; INTRAVENOUS; SUBCUTANEOUS at 22:18

## 2022-07-03 RX ADMIN — SODIUM CHLORIDE 1000 ML: 9 INJECTION, SOLUTION INTRAVENOUS at 21:35

## 2022-07-03 RX ADMIN — SODIUM CHLORIDE 70 ML: 9 INJECTION, SOLUTION INTRAVENOUS at 22:41

## 2022-07-03 ASSESSMENT — ENCOUNTER SYMPTOMS
CONSTITUTIONAL NEGATIVE: 1
EYES NEGATIVE: 1
NEUROLOGICAL NEGATIVE: 1
CARDIOVASCULAR NEGATIVE: 1
ENDOCRINE NEGATIVE: 1
PSYCHIATRIC NEGATIVE: 1
HEMATOLOGIC/LYMPHATIC NEGATIVE: 1
RESPIRATORY NEGATIVE: 1
MUSCULOSKELETAL NEGATIVE: 1
ABDOMINAL PAIN: 1
ALLERGIC/IMMUNOLOGIC NEGATIVE: 1

## 2022-07-04 VITALS
BODY MASS INDEX: 41.83 KG/M2 | WEIGHT: 245 LBS | RESPIRATION RATE: 18 BRPM | HEIGHT: 64 IN | TEMPERATURE: 99.2 F | OXYGEN SATURATION: 96 % | DIASTOLIC BLOOD PRESSURE: 78 MMHG | SYSTOLIC BLOOD PRESSURE: 126 MMHG | HEART RATE: 87 BPM

## 2022-07-04 NOTE — CONFIDENTIAL NOTE
"  S-(situation): Call from patient who says she is in pain.  Patient report she is in \"emotional pain\" b/c she watch a movie w/ sad theme.    Patient also says she has constant pain in her abdomen where her umbilical hernia is since yesterday; pain in unchanged. Patient says in the past she has had pain there but they don't las this long -- maybe half a day.    Patient reports pain started yesterday; pain level 5-7/10; goes up to 9/10 with movement.    No fever, no nausea or vomiting.       B-(background):   Patient report umbilical hernia occurred after she had her child.    A-(assessment): 2nd level triage required    8:10 pm, paged on-call provider, Dr. Mcfarlane via [facility] to call [facility] back. Call from Dr. Mcfarlane with recommendation for patient to be evaluated in the ED.      R-(recommendations): Notified patient to be seen in the ED.    Reviewed care advice with caller per RN triage protocol guideline.  Advised to call back with worsening symptoms, concerns or questions.   Caller verbalized understanding.        Magnus May RN/[facility]                        "

## 2022-07-04 NOTE — ED TRIAGE NOTES
"Pt reports ongoing umbilical hernia pain that has worsened since yesterday. Pain with movement and sitting. Pt reports seeing PCP with this and was told it is not an emergency; however, she is concerned that it is growing in size. Denies n/v/d or constipation. Reports taking motrin \"sometime ago.\"     Triage Assessment     Row Name 07/03/22 2049       Triage Assessment (Adult)    Airway WDL WDL       Respiratory WDL    Respiratory WDL WDL       Skin Circulation/Temperature WDL    Skin Circulation/Temperature WDL WDL       Cardiac WDL    Cardiac WDL WDL       Peripheral/Neurovascular WDL    Peripheral Neurovascular WDL WDL       Cognitive/Neuro/Behavioral WDL    Cognitive/Neuro/Behavioral WDL WDL              "

## 2022-07-04 NOTE — ED NOTES
"Patient  is very sleepy and falls asleep during conversation.  Patient reports taking \"bedtime\" medications while at home due to not feeling well prior to coming in.  Does arouse to voice.  Patient currently rating pain \"5\"/10 Toradol given.  Awaiting CT.  "

## 2022-07-04 NOTE — DISCHARGE INSTRUCTIONS
1) Your evaluation today confirmed that you do have a periumbilical hernia that measures 9 x 5.7 x 7.4 cm.  The hernia contains fat and fluid but no bowel.  Your pain is being managed and we discussed that surgical intervention would be helpful given increasing size as reported in episodes of pain and discomfort.    2) A referral has been placed to the general surgery clinic where he should be call for follow-up appointment next 1 to 2 weeks to discuss further definitive management including if you are a good candidate for surgical repair.    3) you appear stable for discharge home at this time however if you develop a fever or vomiting a bulge that is firm and hard that is not reducible or noticed any gray or purplish discoloration around your abdomen or any new concerns you should return to be reevaluated.

## 2022-07-04 NOTE — ED PROVIDER NOTES
History     Chief Complaint   Patient presents with     Umbilical Hernia Pain     HPI  Maddy Ortiz is a 36 year old female who presents with periumbilical abdominal pain.  Patient has multiple medical diagnoses including history of bipolar type schizoaffective disorder, GERD, history of paranoia and psychosis, and history of fatty liver, type 2 diabetes.    On examination patient was accompanied by her boyfriend Jay Jay reporting that she has had persistent pain last 24 hours over the umbilicus.  She was concerned that her umbilicus has been growing and that has been firm and tender.  She had passed flatus today and had a bowel movement couple hours prior to ED arrival.  There is been no nausea or vomiting and reports no abdominal trauma.  Due to pain around her umbilical hernia she presented for further care and evaluation.    Allergies:  Allergies   Allergen Reactions     Dust Mites Shortness Of Breath     Animal Dander      Other reaction(s): *Unknown     Metformin Fatigue     Patient is taking extended release at home as of 3/3/22     Mold      Other reaction(s): Runny Nose     Trees        Problem List:    Patient Active Problem List    Diagnosis Date Noted     Overdose, undetermined intent, initial encounter 06/18/2022     Priority: Medium     Polypharmacy 04/25/2022     Priority: Medium     Sedated due to multiple medications 04/25/2022     Priority: Medium     Elevated LFTs 01/08/2022     Priority: Medium     Disorganized behavior 01/08/2022     Priority: Medium     Infection due to 2019 novel coronavirus 01/08/2022     Priority: Medium     Diabetes mellitus, type 2 (H) 12/13/2021     Priority: Medium     Fatty liver 11/05/2021     Priority: Medium     Umbilical hernia without obstruction and without gangrene 10/26/2021     Priority: Medium     Schizoaffective disorder (H) 07/13/2021     Priority: Medium     Morbid obesity (H) 01/02/2019     Priority: Medium     Paranoia (psychosis) (H) 08/24/2018      Priority: Medium     Gastroesophageal reflux disease without esophagitis 06/08/2018     Priority: Medium     Schizoaffective disorder, bipolar type (H) 05/07/2018     Priority: Medium     Encounter for IUD insertion 09/15/2017     Priority: Medium     4/24/22 bryanna insertion lot# [id number] exp-4/2023       Seasonal allergic rhinitis due to pollen 11/04/2016     Priority: Medium     Allergic rhinitis due to mold 11/04/2016     Priority: Medium     Allergic rhinitis due to animal dander 11/04/2016     Priority: Medium     Allergic rhinitis due to dust mite 11/04/2016     Priority: Medium     Depression with anxiety 01/26/2015     Priority: Medium     Insomnia 07/18/2013     Priority: Medium     Bipolar 1 disorder (H) 12/06/2012     Priority: Medium     Planning on seeing Purvi (psychiatric nurse)       Paranoid type delusional disorder (H) 11/14/2012     Priority: Medium     Psychosis (H) 10/16/2012     Priority: Medium     Animal dander allergy 05/09/2012     Priority: Medium     Dog and Cat       CARDIOVASCULAR SCREENING; LDL GOAL LESS THAN 160 05/09/2012     Priority: Medium        Past Medical History:    Past Medical History:   Diagnosis Date     Bipolar 1 disorder (H) 12/6/2012     Depressive disorder      Diabetes mellitus, type 2 (H) 12/13/2021     Paranoid type delusional disorder (H) 11/14/2012       Past Surgical History:    Past Surgical History:   Procedure Laterality Date     TONSILLECTOMY & ADENOIDECTOMY      as a child     TONSILLECTOMY & ADENOIDECTOMY         Family History:    Family History   Adopted: Yes   Problem Relation Age of Onset     Unknown/Adopted Mother      Unknown/Adopted Father      Unknown/Adopted Other      Hypertension Sister        Social History:  Marital Status:  Single [1]  Social History     Tobacco Use     Smoking status: Former Smoker     Packs/day: 0.50     Types: Cigarettes     Smokeless tobacco: Former User     Tobacco comment: around 2nd hand smoke   Vaping Use     Vaping  "Use: Never used   Substance Use Topics     Alcohol use: Not Currently     Drug use: Not Currently        Medications:    acetaminophen (TYLENOL) 325 MG tablet  albuterol (PROAIR HFA/PROVENTIL HFA/VENTOLIN HFA) 108 (90 Base) MCG/ACT inhaler  ammonium lactate (LAC-HYDRIN) 12 % external cream  azelastine (ASTELIN) 0.1 % nasal spray  blood glucose (NO BRAND SPECIFIED) test strip  budesonide-formoterol (SYMBICORT) 80-4.5 MCG/ACT Inhaler  cetirizine (ZYRTEC) 10 MG tablet  EPINEPHrine (ANY BX GENERIC EQUIV) 0.3 MG/0.3ML injection 2-pack  fluticasone (FLONASE) 50 MCG/ACT nasal spray  gabapentin (NEURONTIN) 300 MG capsule  hydrOXYzine (ATARAX) 25 MG tablet  insulin pen needle (32G X 4 MM) 32G X 4 MM miscellaneous  levothyroxine (SYNTHROID/LEVOTHROID) 50 MCG tablet  liraglutide (VICTOZA) 18 MG/3ML solution  lithium (ESKALITH) 150 MG capsule  mineral oil-hydrophilic petrolatum (AQUAPHOR) external ointment  OLANZapine (ZYPREXA) 5 MG tablet  risperiDONE microspheres ER (RISPERDAL CONSTA) 50 MG injection  terbinafine (LAMISIL AT) 1 % external cream          Review of Systems   Constitutional: Negative.    HENT: Negative.    Eyes: Negative.    Respiratory: Negative.    Cardiovascular: Negative.    Gastrointestinal: Positive for abdominal pain.   Endocrine: Negative.    Genitourinary: Negative.    Musculoskeletal: Negative.    Skin: Negative.    Allergic/Immunologic: Negative.    Neurological: Negative.    Hematological: Negative.    Psychiatric/Behavioral: Negative.    All other systems reviewed and are negative.      Physical Exam   BP: 127/80  Pulse: 99  Temp: 99.2  F (37.3  C)  Resp: 18  Height: 161.3 cm (5' 3.5\")  Weight: 111.1 kg (245 lb)  SpO2: 95 %      Physical Exam  Constitutional:       General: She is not in acute distress.     Appearance: Normal appearance. She is not ill-appearing, toxic-appearing or diaphoretic.   HENT:      Head: Normocephalic and atraumatic.      Right Ear: Tympanic membrane normal.      Left Ear: " Tympanic membrane normal.      Nose: Nose normal.   Eyes:      Extraocular Movements: Extraocular movements intact.      Pupils: Pupils are equal, round, and reactive to light.   Cardiovascular:      Rate and Rhythm: Normal rate and regular rhythm.   Pulmonary:      Effort: Pulmonary effort is normal. No respiratory distress.      Breath sounds: Normal breath sounds. No stridor. No wheezing, rhonchi or rales.   Chest:      Chest wall: Tenderness present.   Abdominal:      Tenderness: There is abdominal tenderness in the periumbilical area.      Hernia: A hernia is present. Hernia is present in the umbilical area.       Musculoskeletal:         General: No swelling, tenderness, deformity or signs of injury.      Cervical back: Normal range of motion and neck supple.      Right lower leg: No edema.      Left lower leg: No edema.   Skin:     Capillary Refill: Capillary refill takes less than 2 seconds.      Coloration: Skin is not jaundiced or pale.      Findings: No bruising, erythema, lesion or rash.   Neurological:      General: No focal deficit present.      Mental Status: She is alert.   Psychiatric:         Mood and Affect: Mood normal.         Behavior: Behavior normal.         Thought Content: Thought content normal.         Judgment: Judgment normal.         ED Course                 Procedures              Critical Care time:  none             ED medications:    Medications   sodium chloride 0.9% infusion (0 mLs Intravenous Stopped 7/4/22 0050)   HYDROmorphone (PF) (DILAUDID) injection 0.5 mg (0.5 mg Intravenous Given 7/3/22 2218)   ketorolac (TORADOL) injection 15 mg (15 mg Intravenous Given 7/3/22 2141)   0.9% sodium chloride BOLUS (0 mLs Intravenous Stopped 7/3/22 2315)   iopamidol (ISOVUE-370) solution 100 mL (100 mLs Intravenous Given 7/3/22 2241)   sodium chloride 0.9 % bag 500mL for CT scan flush use (70 mLs Intravenous Given 7/3/22 2241)     ED Vitals:  Vitals:    07/03/22 2345 07/04/22 0000 07/04/22  0015 07/04/22 0030   BP: 121/78 114/73 100/70 108/54   Pulse: 91 87 81 82   Resp:       Temp:       TempSrc:       SpO2: 96% 96% 95% 95%   Weight:       Height:           ED labs and imaging:  Results for orders placed or performed during the hospital encounter of 07/03/22   CT Abdomen Pelvis w Contrast     Status: None    Narrative    EXAM: CT ABDOMEN AND PELVIS WITH CONTRAST  LOCATION: [facility]  DATE/TIME: 7/3/2022 10:34 PM    INDICATION: Incarcerated umbilical hernia.  COMPARISON: 3/3/2022.    TECHNIQUE: CT scan of the abdomen and pelvis was performed following injection of IV contrast. Multiplanar reformats were obtained. Dose reduction techniques were used.  CONTRAST: 100 mL Isovue-370.    FINDINGS:    LOWER CHEST: A trace amount of right pleural fluid.    HEPATOBILIARY: Mild diffuse fatty infiltration of the liver.    SPLEEN: Unremarkable.    PANCREAS: Unremarkable.    ADRENAL GLANDS: Unremarkable.    KIDNEYS/BLADDER: Unremarkable.    BOWEL: No dilatation of the small or large bowel. Normal appendix. No visualized bowel wall thickening, pneumatosis or free intraperitoneal gas.    LYMPH NODES: Unremarkable.    PELVIC ORGANS: No acute findings.    MUSCULOSKELETAL: No acute findings.    OTHER: Moderate sized paraumbilical hernia containing fat and fluid, measuring 9.0 x 5.7 cm in axial dimensions and 7.4 cm in craniocaudal dimension. A trace amount of perihepatic free fluid.      Impression    IMPRESSION:   1.  Moderate sized paraumbilical hernia containing fat and fluid. The fluid within the hernia is suggestive of associated inflammation. No bowel within the hernia.  2.  A trace amount of perihepatic free fluid and a trace amount of right pleural fluid.  3.  No other acute abnormality identified in the abdomen or pelvis.   [place] Draw     Status: None    Narrative    The following orders were created for panel order [place] Draw.  Procedure                                Abnormality         Status                     ---------                               -----------         ------                     Extra Blue Top Tube[[id number]]                              Final result               Extra Red Top Tube[[id number]]                               Final result               Extra Green Top (Lithium...[[id number]]                      Final result               Extra Purple Top Tube[[id number]]                            Final result                 Please view results for these tests on the individual orders.   Comprehensive metabolic panel     Status: Abnormal   Result Value Ref Range    Sodium 139 133 - 144 mmol/L    Potassium 4.3 3.4 - 5.3 mmol/L    Chloride 108 94 - 109 mmol/L    Carbon Dioxide (CO2) 24 20 - 32 mmol/L    Anion Gap 7 3 - 14 mmol/L    Urea Nitrogen 11 7 - 30 mg/dL    Creatinine 0.59 0.52 - 1.04 mg/dL    Calcium 9.1 8.5 - 10.1 mg/dL    Glucose 241 (H) 70 - 99 mg/dL    Alkaline Phosphatase 77 40 - 150 U/L     (H) 0 - 45 U/L     (H) 0 - 50 U/L    Protein Total 7.1 6.8 - 8.8 g/dL    Albumin 3.3 (L) 3.4 - 5.0 g/dL    Bilirubin Total 0.4 0.2 - 1.3 mg/dL    GFR Estimate >90 >60 mL/min/1.73m2   Lipase     Status: Normal   Result Value Ref Range    Lipase 181 73 - 393 U/L   UA with Microscopic reflex to Culture     Status: Abnormal    Specimen: Urine, Clean Catch   Result Value Ref Range    Color Urine Yellow Colorless, Straw, Light Yellow, Yellow    Appearance Urine Slightly Cloudy (A) Clear    Glucose Urine Negative Negative mg/dL    Bilirubin Urine Negative Negative    Ketones Urine Negative Negative mg/dL    Specific Gravity Urine 1.017 1.003 - 1.035    Blood Urine Negative Negative    pH Urine 6.0 5.0 - 7.0    Protein Albumin Urine Negative Negative mg/dL    Urobilinogen Urine Normal Normal, 2.0 mg/dL    Nitrite Urine Negative Negative    Leukocyte Esterase Urine Large (A) Negative    Mucus Urine Present (A) None Seen /LPF    RBC Urine 8 (H) <=2 /HPF    WBC  Urine 30 (H) <=5 /HPF    Squamous Epithelials Urine 6 (H) <=1 /HPF    Narrative    Urine Culture ordered based on laboratory criteria   Lactic acid whole blood     Status: Abnormal   Result Value Ref Range    Lactic Acid 2.4 (H) 0.7 - 2.0 mmol/L   HCG qualitative pregnancy (blood)     Status: Normal   Result Value Ref Range    hCG Serum Qualitative Negative Negative   Extra Blue Top Tube     Status: None   Result Value Ref Range    Hold Specimen JIC    Extra Red Top Tube     Status: None   Result Value Ref Range    Hold Specimen JIC    Extra Green Top (Lithium Heparin) Tube     Status: None   Result Value Ref Range    Hold Specimen JIC    Extra Purple Top Tube     Status: None   Result Value Ref Range    Hold Specimen JIC    CBC with platelets and differential     Status: Abnormal   Result Value Ref Range    WBC Count 7.7 4.0 - 11.0 10e3/uL    RBC Count 3.43 (L) 3.80 - 5.20 10e6/uL    Hemoglobin 10.5 (L) 11.7 - 15.7 g/dL    Hematocrit 31.7 (L) 35.0 - 47.0 %    MCV 92 78 - 100 fL    MCH 30.6 26.5 - 33.0 pg    MCHC 33.1 31.5 - 36.5 g/dL    RDW 12.9 10.0 - 15.0 %    Platelet Count 130 (L) 150 - 450 10e3/uL    % Neutrophils 68 %    % Lymphocytes 20 %    % Monocytes 7 %    % Eosinophils 3 %    % Basophils 1 %    % Immature Granulocytes 1 %    NRBCs per 100 WBC 0 <1 /100    Absolute Neutrophils 5.3 1.6 - 8.3 10e3/uL    Absolute Lymphocytes 1.5 0.8 - 5.3 10e3/uL    Absolute Monocytes 0.5 0.0 - 1.3 10e3/uL    Absolute Eosinophils 0.3 0.0 - 0.7 10e3/uL    Absolute Basophils 0.1 0.0 - 0.2 10e3/uL    Absolute Immature Granulocytes 0.1 <=0.4 10e3/uL    Absolute NRBCs 0.0 10e3/uL   Lactic acid whole blood     Status: Normal   Result Value Ref Range    Lactic Acid 1.0 0.7 - 2.0 mmol/L   CBC with platelets differential     Status: Abnormal    Narrative    The following orders were created for panel order CBC with platelets differential.  Procedure                               Abnormality         Status                      ---------                               -----------         ------                     CBC with platelets and d...[[id number]]  Abnormal            Final result                 Please view results for these tests on the individual orders.         Assessments & Plan (with Medical Decision Making)   Assessment Summary and Clinical impression: 36-year-old female presenting with umbilical pain and discomfort.  Patient was concerned that she had an interval enlargement in the size of her known umbilical hernia.  She appeared uncomfortable and there was a firm rigid umbilical hernia without any skin changes anteriorly she was afebrile hemodynamically normal.  Due to concern for incarcerated umbilical hernia versus strangulation advanced imaging with contrast was obtained for interval comparison from recent imaging from 3 months ago.  She was offered therapies for pain and discomfort.  CT confirmed a paraumbilical hernia that contains fat and fluid but no bowel.  Hernia measured 9 x 5 x 7 cm.  She was discharged with outpatient follow-up in surgery clinic for further definitive management and discussion about treatment options.    ED course and plan:  Reviewed the medical record.  Patient was hospitalized June 17 through June 22 for bipolar 1 disorder, lithium overdose, schizoaffective disorder.  Last abdominal imaging was on March 3, 2022 which showed severe hepatic steatosis hepatomegaly and splenomegaly that appeared to increase in size from last comparison imaging.  There was a 2.8 cm right ovarian cysts and with small fat-containing periumbilical hernia. Reviewed possible causes for her pain and discomfort a broad differential was considered.  Offered therapies to manage her pain and discomfort and CT imaging with contrast obtained to evaluate for incarceration for strangulation.    Work-up revealed lactate of 2.4.  Normal white count.  Hemoglobin 10.5 hematocrit 31.7.  Lactate was trended and normalized after  hydration and pain management urine pregnancy was negative.    CT imaging revealed moderate sized paraumbilical hernia containing fat and fluid.  The fluid within the hernia suggestive of associated inflammation but there is no bowel within the hernia.  The hernia measured 9 x 5 cm and was 7.4 cm in craniocaudal dimension.  There is a trace amount of perihepatic free fluid noted by the reading radiologist.  See details in radiology report.    I discussed with the patient and her partner that further follow-up in general surgery clinic for  Hernia repair may be helpful given increasing pain and swelling there is no findings to suggest incarceration or strangulation.  After therapies given during her ED visit she appeared more comfortable expressed a desire to go home.  We discussed her CT findings and the importance of outpatient follow-up with general surgery clinic to discuss if she is a surgical candidate for repair.  I placed a referral to the general surgery clinic at the [facility] to help with further definitive management discussion about treatment options.  Patient and her boyfriend was present during ED course expressed comfort, understanding and agreement with plan of care.      Disclaimer: This note consists of symbols derived from keyboarding, dictation and/or voice recognition software. As a result, there may be errors in the script that have gone undetected. Please consider this when interpreting information found in this chart.  I have reviewed the nursing notes.    I have reviewed the findings, diagnosis, plan and need for follow up with the patient.       New Prescriptions    No medications on file       Final diagnoses:   Abnormal CT of the abdomen   Paraumbilical hernia - 9 x 5 x 7.4 cm containing fat and fluid with inflammatory changes       7/3/2022   [facility] EMERGENCY DEPT     Frederick Tony MD  07/04/22 0050

## 2022-07-05 LAB — BACTERIA UR CULT: NORMAL

## 2022-07-05 NOTE — RESULT ENCOUNTER NOTE
Final urine culture report is negative.  Adult Negative Urine culture parameters per protocol: Any # Urogenital single or mixed organism, <10,000 col/ml single organism (cath/midstream), and > 3 organisms (No susceptibilities performed).  [facility] Emergency Dept discharge antibiotic prescribed (If applicable): None  Treatment recommendations per [facility] ED Lab Result Urine Culture protocol.

## 2022-07-05 NOTE — RESULT ENCOUNTER NOTE
Covering for primary/ordering provider; she is already established with diabetes ed from what I can see, just needs to call and schedule.  [phone]

## 2022-07-06 ASSESSMENT — ENCOUNTER SYMPTOMS
INSOMNIA: 1
PANIC: 1
NERVOUS/ANXIOUS: 1
DEPRESSION: 1
DECREASED CONCENTRATION: 1

## 2022-07-07 ENCOUNTER — PREP FOR PROCEDURE (OUTPATIENT)
Dept: SURGERY | Facility: CLINIC | Age: 37
End: 2022-07-07

## 2022-07-07 ENCOUNTER — OFFICE VISIT (OUTPATIENT)
Dept: SURGERY | Facility: CLINIC | Age: 37
End: 2022-07-07
Attending: EMERGENCY MEDICINE
Payer: MEDICARE

## 2022-07-07 ENCOUNTER — TELEPHONE (OUTPATIENT)
Dept: EDUCATION SERVICES | Facility: CLINIC | Age: 37
End: 2022-07-07

## 2022-07-07 VITALS
WEIGHT: 252.9 LBS | HEART RATE: 91 BPM | SYSTOLIC BLOOD PRESSURE: 126 MMHG | DIASTOLIC BLOOD PRESSURE: 82 MMHG | HEIGHT: 63 IN | BODY MASS INDEX: 44.81 KG/M2 | OXYGEN SATURATION: 98 %

## 2022-07-07 DIAGNOSIS — K42.0 UMBILICAL HERNIA, INCARCERATED: Primary | ICD-10-CM

## 2022-07-07 DIAGNOSIS — K42.9 UMBILICAL HERNIA WITHOUT OBSTRUCTION AND WITHOUT GANGRENE: Primary | ICD-10-CM

## 2022-07-07 DIAGNOSIS — Z53.9 ERRONEOUS ENCOUNTER--DISREGARD: Primary | ICD-10-CM

## 2022-07-07 PROCEDURE — 99203 OFFICE O/P NEW LOW 30 MIN: CPT | Performed by: SURGERY

## 2022-07-07 RX ORDER — CEFAZOLIN SODIUM 2 G/50ML
2 SOLUTION INTRAVENOUS SEE ADMIN INSTRUCTIONS
Status: CANCELLED | OUTPATIENT
Start: 2022-07-07

## 2022-07-07 RX ORDER — CEFAZOLIN SODIUM 2 G/50ML
2 SOLUTION INTRAVENOUS
Status: CANCELLED | OUTPATIENT
Start: 2022-07-07

## 2022-07-07 ASSESSMENT — ENCOUNTER SYMPTOMS
DYSURIA: 0
DOUBLE VISION: 0
WEAKNESS: 0
INCREASED ENERGY: 0
POOR WOUND HEALING: 0
NAIL CHANGES: 0
EYE IRRITATION: 0
HOARSE VOICE: 0
PARALYSIS: 0
PALPITATIONS: 0
BACK PAIN: 0
FATIGUE: 0
COUGH DISTURBING SLEEP: 0
SLEEP DISTURBANCES DUE TO BREATHING: 0
WEIGHT LOSS: 0
INSOMNIA: 1
SNORES LOUDLY: 0
SMELL DISTURBANCE: 0
SPUTUM PRODUCTION: 0
TREMORS: 0
DIFFICULTY URINATING: 0
HEADACHES: 0
VOMITING: 0
WEIGHT GAIN: 0
NUMBNESS: 0
HYPERTENSION: 0
JAUNDICE: 0
CONSTIPATION: 0
MEMORY LOSS: 0
DISTURBANCES IN COORDINATION: 0
SKIN CHANGES: 0
DECREASED LIBIDO: 0
BLOATING: 0
BLOOD IN STOOL: 0
EXTREMITY NUMBNESS: 0
ORTHOPNEA: 0
HYPOTENSION: 0
SEIZURES: 0
LIGHT-HEADEDNESS: 0
SINUS CONGESTION: 0
HEARTBURN: 0
EYE PAIN: 0
ARTHRALGIAS: 0
EYE WATERING: 0
ALTERED TEMPERATURE REGULATION: 0
NECK PAIN: 0
HEMATURIA: 0
RECTAL BLEEDING: 0
RECTAL PAIN: 0
SWOLLEN GLANDS: 0
SPEECH CHANGE: 0
HOT FLASHES: 0
EXERCISE INTOLERANCE: 0
BRUISES/BLEEDS EASILY: 0
MYALGIAS: 0
BREAST PAIN: 0
RESPIRATORY PAIN: 0
BOWEL INCONTINENCE: 0
POSTURAL DYSPNEA: 0
LEG SWELLING: 0
DECREASED CONCENTRATION: 1
CLAUDICATION: 0
TACHYCARDIA: 0
TROUBLE SWALLOWING: 0
JOINT SWELLING: 0
BREAST MASS: 0
SINUS PAIN: 0
DEPRESSION: 1
MUSCLE WEAKNESS: 0
FLANK PAIN: 0
FEVER: 0
DYSPNEA ON EXERTION: 0
COUGH: 0
NAUSEA: 0
DECREASED APPETITE: 0
SHORTNESS OF BREATH: 0
POLYPHAGIA: 0
NERVOUS/ANXIOUS: 1
SORE THROAT: 0
TINGLING: 0
PANIC: 1
STIFFNESS: 0
CHILLS: 0
TASTE DISTURBANCE: 0
POLYDIPSIA: 0
NECK MASS: 0
LOSS OF CONSCIOUSNESS: 0
EYE REDNESS: 0
WHEEZING: 0
MUSCLE CRAMPS: 0
LEG PAIN: 0
ABDOMINAL PAIN: 0
HEMOPTYSIS: 0
HALLUCINATIONS: 0
NIGHT SWEATS: 0
DIARRHEA: 0
SYNCOPE: 0
DIZZINESS: 0

## 2022-07-07 ASSESSMENT — PAIN SCALES - GENERAL: PAINLEVEL: MODERATE PAIN (4)

## 2022-07-07 NOTE — LETTER
"7/7/2022       RE: Maddy Ortiz  [address]  [facility] [address]     Dear Colleague,    Thank you for referring your patient, Maddy Ortiz, to the [facility] [place] [facility]. Please see a copy of my visit note below.        New Hernia Consultation Note      Maddy Ortiz  [id number]  1985    Requesting Provider: Frederick Tony    Dear MsJulienne Pete,    I was asked by Frederick Tony to see this patient for the following problem:    CHIEF COMPLAINT:  Chief Complaint Reviewed With Patient 7/6/2022   I am here today to be seen for: Umbilical Hernia     Maddy has an umbilical hernia which has prompted ED visit recently.    Notes from her presentation there are as follows:  \"Maddy Ortiz is a 36 year old female who presents with periumbilical abdominal pain.  Patient has multiple medical diagnoses including history of bipolar type schizoaffective disorder, GERD, history of paranoia and psychosis, and history of fatty liver, type 2 diabetes.     On examination patient was accompanied by her boyfriend Jay Jay reporting that she has had persistent pain last 24 hours over the umbilicus.  She was concerned that her umbilicus has been growing and that has been firm and tender.  She had passed flatus today and had a bowel movement couple hours prior to ED arrival.  There is been no nausea or vomiting and reports no abdominal trauma.  Due to pain around her umbilical hernia she presented for further care and evaluation.\"        The CT scan in the ED documented absence of strangulation.    A representative photo shows fascial opening at <2 inches.                  Of note, Maddy has severe psychiatric illness from Bipolar Disease and this will impact her preparation for surgery and recovery.      HISTORY OF PRESENT ILLNESS:  Location: umbilical  Severity: Moderate     Timing of Hernia Reviewed With Patient " 7/6/2022   The onset of my hernia symptoms was: Gradual   My symptoms are: Constant   My symptoms have been: Worsening       Duration Reviewed With Patient 7/6/2022   My symptoms began: when pregnant 14 years ago       Modifying Factor Questions Reviewed With Patient 7/6/2022   My hernia symptoms improve with: during rest   My hernia symptoms worsen with: exercise       Associated Signs and Symptoms Reviewed With Patient 7/6/2022   I have the following complaints/concerns related to my hernia: Pain, Abdominal Bulge or Protusion       [facility]ERY-[facility]ERN[facility] HISTORY 7/6/2022   My hernia has been repaired with mesh in the past? No       Patient Supplied Answers To HerQLes Assessment Questionnaire  No flowsheet data found.  _______________________________________________________________________            NUTRITIONAL STATUS:  Lab Results   Component Value Date    ALBUMIN 3.3 07/03/2022    ALBUMIN 3.6 04/23/2018       Body mass index is 44.8 kg/m .    Patient is not immunosuppressed.    Patient is not a current smoker.    Past Medical History:   Diagnosis Date     Bipolar 1 disorder (H) 12/6/2012     Depressive disorder      Diabetes mellitus, type 2 (H) 12/13/2021     Paranoid type delusional disorder (H) 11/14/2012       Patient Active Problem List   Diagnosis     Animal dander allergy     CARDIOVASCULAR SCREENING; LDL GOAL LESS THAN 160     Psychosis (H)     Paranoid type delusional disorder (H)     Bipolar 1 disorder (H)     Insomnia     Depression with anxiety     Seasonal allergic rhinitis due to pollen     Allergic rhinitis due to mold     Allergic rhinitis due to animal dander     Allergic rhinitis due to dust mite     Encounter for IUD insertion     Schizoaffective disorder, bipolar type (H)     Gastroesophageal reflux disease without esophagitis     Paranoia (psychosis) (H)     Morbid obesity (H)     Schizoaffective disorder (H)     Umbilical hernia without obstruction and without gangrene     Fatty liver      Diabetes mellitus, type 2 (H)     Elevated LFTs     Disorganized behavior     Infection due to 2019 novel coronavirus     Polypharmacy     Sedated due to multiple medications     Overdose, undetermined intent, initial encounter       Past Surgical History:   Procedure Laterality Date     TONSILLECTOMY & ADENOIDECTOMY      as a child     TONSILLECTOMY & ADENOIDECTOMY         MEDICATIONS:  Current Outpatient Medications   Medication     acetaminophen (TYLENOL) 325 MG tablet     albuterol (PROAIR HFA/PROVENTIL HFA/VENTOLIN HFA) 108 (90 Base) MCG/ACT inhaler     ammonium lactate (LAC-HYDRIN) 12 % external cream     azelastine (ASTELIN) 0.1 % nasal spray     blood glucose (NO BRAND SPECIFIED) test strip     budesonide-formoterol (SYMBICORT) 80-4.5 MCG/ACT Inhaler     cetirizine (ZYRTEC) 10 MG tablet     EPINEPHrine (ANY BX GENERIC EQUIV) 0.3 MG/0.3ML injection 2-pack     fluticasone (FLONASE) 50 MCG/ACT nasal spray     gabapentin (NEURONTIN) 300 MG capsule     hydrOXYzine (ATARAX) 25 MG tablet     insulin pen needle (32G X 4 MM) 32G X 4 MM miscellaneous     levothyroxine (SYNTHROID/LEVOTHROID) 50 MCG tablet     liraglutide (VICTOZA) 18 MG/3ML solution     lithium (ESKALITH) 150 MG capsule     mineral oil-hydrophilic petrolatum (AQUAPHOR) external ointment     OLANZapine (ZYPREXA) 5 MG tablet     risperiDONE microspheres ER (RISPERDAL CONSTA) 50 MG injection     terbinafine (LAMISIL AT) 1 % external cream     No current facility-administered medications for this visit.       ALLERGIES:  Allergies   Allergen Reactions     Dust Mites Shortness Of Breath     Animal Dander      Other reaction(s): *Unknown     Metformin Fatigue     Patient is taking extended release at home as of 3/3/22     Mold      Other reaction(s): Runny Nose     Trees        Social History     Socioeconomic History     Marital status: Single     Spouse name: None     Number of children: None     Years of education: None     Highest education level: None    Occupational History     Employer: CURRENTLY UNEMPLOYED AT THIS TIME   Tobacco Use     Smoking status: Former Smoker     Packs/day: 0.50     Types: Cigarettes     Smokeless tobacco: Former User     Tobacco comment: around 2nd hand smoke   Vaping Use     Vaping Use: Never used   Substance and Sexual Activity     Alcohol use: Not Currently     Drug use: Not Currently     Sexual activity: Not Currently     Partners: Male     Birth control/protection: Abstinence, None   Other Topics Concern     Parent/sibling w/ CABG, MI or angioplasty before 65F 55M? No   Social History Narrative    One child    Education: some college        October 21, 2021    ENVIRONMENTAL HISTORY: [place] The home is heated with a electric furnace. They do not have central air conditioning, does have box air conditioner in the wall and has air purifier. The patient's bedroom is furnished with stuffed animals in bed, carpeting in bedroom and fabric window coverings. Pets inside the apartment includes 1 cat. There is history of cockroach or mice infestation. There are no smokers in the house.  The apartment does not have a basement.        Family History   Adopted: Yes   Problem Relation Age of Onset     Unknown/Adopted Mother      Unknown/Adopted Father      Unknown/Adopted Other      Hypertension Sister        Review of Systems     Constitutional:  Negative for fever, chills, weight loss, weight gain, fatigue, decreased appetite, night sweats, recent stressors, height gain, height loss, post-operative complications, incisional pain, hallucinations, increased energy, hyperactivity and confused.   HENT:  Negative for ear pain, hearing loss, tinnitus, nosebleeds, trouble swallowing, hoarse voice, mouth sores, sore throat, ear discharge, tooth pain, gum tenderness, taste disturbance, smell disturbance, hearing aid, bleeding gums, dry mouth, sinus pain, sinus congestion and neck mass.    Eyes:   Negative for double vision, pain, redness, eye pain, decreased vision, eye watering, eye bulging, eye dryness, flashing lights, spots, floaters, strabismus, tunnel vision, jaundice and eye irritation.   Respiratory:   Negative for cough, hemoptysis, sputum production, shortness of breath, wheezing, sleep disturbances due to breathing, snores loudly, respiratory pain, dyspnea on exertion, cough disturbing sleep and postural dyspnea.    Cardiovascular:  Negative for chest pain, dyspnea on exertion, palpitations, orthopnea, claudication, leg swelling, fingers/toes turn blue, hypertension, hypotension, syncope, history of heart murmur, chest pain on exertion, chest pain at rest, pacemaker, few scattered varicosities, leg pain, sleep disturbances due to breathing, tachycardia, light-headedness, exercise intolerance and edema.   Gastrointestinal:  Negative for heartburn, nausea, vomiting, abdominal pain, diarrhea, constipation, blood in stool, melena, rectal pain, bloating, hemorrhoids, bowel incontinence, jaundice, rectal bleeding, coffee ground emesis and change in stool.   Genitourinary:  Negative for bladder incontinence, dysuria, urgency, hematuria, flank pain, vaginal discharge, difficulty urinating, genital sores, dyspareunia, decreased libido, nocturia, voiding less frequently, arousal difficulty, abnormal vaginal bleeding, excessive menstruation, menstrual changes, hot flashes, vaginal dryness and postmenopausal bleeding.   Musculoskeletal:  Negative for myalgias, back pain, joint swelling, arthralgias, stiffness, muscle cramps, neck pain, bone pain, muscle weakness and fracture.   Skin:  Negative for nail changes, itching, poor wound healing, rash, hair changes, skin changes, acne, warts, poor wound healing, scarring, flaky skin, Raynaud's phenomenon, sensitivity to sunlight and skin thickening.   Neurological:  Negative for dizziness, tingling, tremors, speech change, seizures, loss of consciousness, weakness,  "light-headedness, numbness, headaches, disturbances in coordination, extremity numbness, memory loss, difficulty walking and paralysis.   Endo/Heme:  Negative for anemia, swollen glands and bruises/bleeds easily.   Psychiatric/Behavioral:  Positive for depression, decreased concentration, mood swings and panic attacks. Negative for hallucinations and memory loss.    Breast:  Negative for breast discharge, breast mass, breast pain and nipple retraction.   Endocrine:  Negative for altered temperature regulation, polyphagia, polydipsia, unwanted hair growth and change in facial hair.      No orders of the defined types were placed in this encounter.      PHYSICAL EXAMINATION:  Vital Signs: /82 (BP Location: Left arm, Patient Position: Sitting, Cuff Size: Adult Large)   Pulse 91   Ht 1.6 m (5' 3\")   Wt 114.7 kg (252 lb 14.4 oz)   LMP 06/17/2022 (Approximate)   SpO2 98%   BMI 44.80 kg/m    HEENT: NCAT; MMM;   Lungs: Breathing unlabored  Abdomen: soft, protuberant from obesity and hernia even more prominent.       PHYSICAL EXAM AREA OF INTEREST:  incarcerated.    IMAGING:  CT scan reviewed: documents presence of umbilical hernia, <5cm size at fascia.    ASSESSMENT:  umbilical  Hernia size is less than 5cm in size.    DISCUSSION OF RISKS:  I discussed the alternatives, benefits, risks and possible complications of hernia repair with the patient. The risks of hernia surgery with and without mesh are described below.    Based on FDA s analysis of medical device adverse event reports and of peer-reviewed, scientific literature, the most common adverse events for all surgical repair of hernias--with or without mesh--are pain, infection, hernia recurrence, scar-like tissue that sticks tissues together (adhesion), blockage of the large or small intestine (obstruction), bleeding, abnormal connection between organs, vessels, or intestines (fistula), fluid build-up at the surgical site (seroma), and a hole in " "neighboring tissues or organs (perforation).  Some other potential adverse events that can occur following hernia repair with mesh are mesh migration and mesh shrinkage (contraction).    http://www.fda.gov/MedicalDevices/ProductsandMedicalProcedures/ImplantsandProsthetics/HerniaSurgicalMesh/default.htm      There is heightened risk of recurrence due to higher BMI and I emphasized the problem with abdominal wall tension to Maddy.      PLAN:  Hernia surgery indicated.  75 minutes; PAC for preop; same day surgery; [address] [place] [address].    Call Chema at [phone] for scheduling.    Note that I will use robotic assistance; hence, \"Laparoscopic umbilical hernia repair, robotic assist\"    Same day surgery; 75 minutes.        Sincerely,    Elías Montero MD  "

## 2022-07-07 NOTE — PROGRESS NOTES
"    New Hernia Consultation Note      Maddy Ortiz  [id number]  1985    Requesting Provider: Frederick Tony    Dear Ms. Pete,    I was asked by Frederick Tony to see this patient for the following problem:    CHIEF COMPLAINT:  Chief Complaint Reviewed With Patient 7/6/2022   I am here today to be seen for: Umbilical Hernia     Maddy has an umbilical hernia which has prompted ED visit recently.    Notes from her presentation there are as follows:  \"Maddy Ortiz is a 36 year old female who presents with periumbilical abdominal pain.  Patient has multiple medical diagnoses including history of bipolar type schizoaffective disorder, GERD, history of paranoia and psychosis, and history of fatty liver, type 2 diabetes.     On examination patient was accompanied by her boyfriend Jay Jay reporting that she has had persistent pain last 24 hours over the umbilicus.  She was concerned that her umbilicus has been growing and that has been firm and tender.  She had passed flatus today and had a bowel movement couple hours prior to ED arrival.  There is been no nausea or vomiting and reports no abdominal trauma.  Due to pain around her umbilical hernia she presented for further care and evaluation.\"        The CT scan in the ED documented absence of strangulation.    A representative photo shows fascial opening at <2 inches.                  Of note, Maddy has severe psychiatric illness from Bipolar Disease and this will impact her preparation for surgery and recovery.      HISTORY OF PRESENT ILLNESS:  Location: umbilical  Severity: Moderate     Timing of Hernia Reviewed With Patient 7/6/2022   The onset of my hernia symptoms was: Gradual   My symptoms are: Constant   My symptoms have been: Worsening       Duration Reviewed With Patient 7/6/2022   My symptoms began: when pregnant 14 years ago       Modifying Factor Questions Reviewed With Patient 7/6/2022   My hernia symptoms improve with: during rest   My " hernia symptoms worsen with: exercise       Associated Signs and Symptoms Reviewed With Patient 7/6/2022   I have the following complaints/concerns related to my hernia: Pain, Abdominal Bulge or Protusion       [facility] SURGERY-HERNIA HISTORY 7/6/2022   My hernia has been repaired with mesh in the past? No       Patient Supplied Answers To HerQLes Assessment Questionnaire  No flowsheet data found.  _______________________________________________________________________            NUTRITIONAL STATUS:  Lab Results   Component Value Date    ALBUMIN 3.3 07/03/2022    ALBUMIN 3.6 04/23/2018       Body mass index is 44.8 kg/m .    Patient is not immunosuppressed.    Patient is not a current smoker.    Past Medical History:   Diagnosis Date     Bipolar 1 disorder (H) 12/6/2012     Depressive disorder      Diabetes mellitus, type 2 (H) 12/13/2021     Paranoid type delusional disorder (H) 11/14/2012       Patient Active Problem List   Diagnosis     Animal dander allergy     CARDIOVASCULAR SCREENING; LDL GOAL LESS THAN 160     Psychosis (H)     Paranoid type delusional disorder (H)     Bipolar 1 disorder (H)     Insomnia     Depression with anxiety     Seasonal allergic rhinitis due to pollen     Allergic rhinitis due to mold     Allergic rhinitis due to animal dander     Allergic rhinitis due to dust mite     Encounter for IUD insertion     Schizoaffective disorder, bipolar type (H)     Gastroesophageal reflux disease without esophagitis     Paranoia (psychosis) (H)     Morbid obesity (H)     Schizoaffective disorder (H)     Umbilical hernia without obstruction and without gangrene     Fatty liver     Diabetes mellitus, type 2 (H)     Elevated LFTs     Disorganized behavior     Infection due to 2019 novel coronavirus     Polypharmacy     Sedated due to multiple medications     Overdose, undetermined intent, initial encounter       Past Surgical History:   Procedure Laterality Date     TONSILLECTOMY & ADENOIDECTOMY      as a child      TONSILLECTOMY & ADENOIDECTOMY         MEDICATIONS:  Current Outpatient Medications   Medication     acetaminophen (TYLENOL) 325 MG tablet     albuterol (PROAIR HFA/PROVENTIL HFA/VENTOLIN HFA) 108 (90 Base) MCG/ACT inhaler     ammonium lactate (LAC-HYDRIN) 12 % external cream     azelastine (ASTELIN) 0.1 % nasal spray     blood glucose (NO BRAND SPECIFIED) test strip     budesonide-formoterol (SYMBICORT) 80-4.5 MCG/ACT Inhaler     cetirizine (ZYRTEC) 10 MG tablet     EPINEPHrine (ANY BX GENERIC EQUIV) 0.3 MG/0.3ML injection 2-pack     fluticasone (FLONASE) 50 MCG/ACT nasal spray     gabapentin (NEURONTIN) 300 MG capsule     hydrOXYzine (ATARAX) 25 MG tablet     insulin pen needle (32G X 4 MM) 32G X 4 MM miscellaneous     levothyroxine (SYNTHROID/LEVOTHROID) 50 MCG tablet     liraglutide (VICTOZA) 18 MG/3ML solution     lithium (ESKALITH) 150 MG capsule     mineral oil-hydrophilic petrolatum (AQUAPHOR) external ointment     OLANZapine (ZYPREXA) 5 MG tablet     risperiDONE microspheres ER (RISPERDAL CONSTA) 50 MG injection     terbinafine (LAMISIL AT) 1 % external cream     No current facility-administered medications for this visit.       ALLERGIES:  Allergies   Allergen Reactions     Dust Mites Shortness Of Breath     Animal Dander      Other reaction(s): *Unknown     Metformin Fatigue     Patient is taking extended release at home as of 3/3/22     Mold      Other reaction(s): Runny Nose     Trees        Social History     Socioeconomic History     Marital status: Single     Spouse name: None     Number of children: None     Years of education: None     Highest education level: None   Occupational History     Employer: CURRENTLY UNEMPLOYED AT THIS TIME   Tobacco Use     Smoking status: Former Smoker     Packs/day: 0.50     Types: Cigarettes     Smokeless tobacco: Former User     Tobacco comment: around 2nd hand smoke   Vaping Use     Vaping Use: Never used   Substance and Sexual Activity     Alcohol use: Not  Currently     Drug use: Not Currently     Sexual activity: Not Currently     Partners: Male     Birth control/protection: Abstinence, None   Other Topics Concern     Parent/sibling w/ CABG, MI or angioplasty before 65F 55M? No   Social History Narrative    One child    Education: some college        October 21, 2021    ENVIRONMENTAL HISTORY: [place] The home is heated with a electric furnace. They do not have central air conditioning, does have box air conditioner in the wall and has air purifier. The patient's bedroom is furnished with stuffed animals in bed, carpeting in bedroom and fabric window coverings. Pets inside the apartment includes 1 cat. There is history of cockroach or mice infestation. There are no smokers in the house.  The apartment does not have a basement.        Family History   Adopted: Yes   Problem Relation Age of Onset     Unknown/Adopted Mother      Unknown/Adopted Father      Unknown/Adopted Other      Hypertension Sister        Review of Systems     Constitutional:  Negative for fever, chills, weight loss, weight gain, fatigue, decreased appetite, night sweats, recent stressors, height gain, height loss, post-operative complications, incisional pain, hallucinations, increased energy, hyperactivity and confused.   HENT:  Negative for ear pain, hearing loss, tinnitus, nosebleeds, trouble swallowing, hoarse voice, mouth sores, sore throat, ear discharge, tooth pain, gum tenderness, taste disturbance, smell disturbance, hearing aid, bleeding gums, dry mouth, sinus pain, sinus congestion and neck mass.    Eyes:  Negative for double vision, pain, redness, eye pain, decreased vision, eye watering, eye bulging, eye dryness, flashing lights, spots, floaters, strabismus, tunnel vision, jaundice and eye irritation.   Respiratory:   Negative for cough, hemoptysis, sputum production, shortness of breath, wheezing, sleep disturbances due to breathing,  snores loudly, respiratory pain, dyspnea on exertion, cough disturbing sleep and postural dyspnea.    Cardiovascular:  Negative for chest pain, dyspnea on exertion, palpitations, orthopnea, claudication, leg swelling, fingers/toes turn blue, hypertension, hypotension, syncope, history of heart murmur, chest pain on exertion, chest pain at rest, pacemaker, few scattered varicosities, leg pain, sleep disturbances due to breathing, tachycardia, light-headedness, exercise intolerance and edema.   Gastrointestinal:  Negative for heartburn, nausea, vomiting, abdominal pain, diarrhea, constipation, blood in stool, melena, rectal pain, bloating, hemorrhoids, bowel incontinence, jaundice, rectal bleeding, coffee ground emesis and change in stool.   Genitourinary:  Negative for bladder incontinence, dysuria, urgency, hematuria, flank pain, vaginal discharge, difficulty urinating, genital sores, dyspareunia, decreased libido, nocturia, voiding less frequently, arousal difficulty, abnormal vaginal bleeding, excessive menstruation, menstrual changes, hot flashes, vaginal dryness and postmenopausal bleeding.   Musculoskeletal:  Negative for myalgias, back pain, joint swelling, arthralgias, stiffness, muscle cramps, neck pain, bone pain, muscle weakness and fracture.   Skin:  Negative for nail changes, itching, poor wound healing, rash, hair changes, skin changes, acne, warts, poor wound healing, scarring, flaky skin, Raynaud's phenomenon, sensitivity to sunlight and skin thickening.   Neurological:  Negative for dizziness, tingling, tremors, speech change, seizures, loss of consciousness, weakness, light-headedness, numbness, headaches, disturbances in coordination, extremity numbness, memory loss, difficulty walking and paralysis.   Endo/Heme:  Negative for anemia, swollen glands and bruises/bleeds easily.   Psychiatric/Behavioral:  Positive for depression, decreased concentration, mood swings and panic attacks. Negative for  "hallucinations and memory loss.    Breast:  Negative for breast discharge, breast mass, breast pain and nipple retraction.   Endocrine:  Negative for altered temperature regulation, polyphagia, polydipsia, unwanted hair growth and change in facial hair.      No orders of the defined types were placed in this encounter.      PHYSICAL EXAMINATION:  Vital Signs: /82 (BP Location: Left arm, Patient Position: Sitting, Cuff Size: Adult Large)   Pulse 91   Ht 1.6 m (5' 3\")   Wt 114.7 kg (252 lb 14.4 oz)   LMP 06/17/2022 (Approximate)   SpO2 98%   BMI 44.80 kg/m    HEENT: NCAT; MMM;   Lungs: Breathing unlabored  Abdomen: soft, protuberant from obesity and hernia even more prominent.       PHYSICAL EXAM AREA OF INTEREST:  incarcerated.    IMAGING:  CT scan reviewed: documents presence of umbilical hernia, <5cm size at fascia.    ASSESSMENT:  umbilical  Hernia size is less than 5cm in size.    DISCUSSION OF RISKS:  I discussed the alternatives, benefits, risks and possible complications of hernia repair with the patient. The risks of hernia surgery with and without mesh are described below.    Based on FDA s analysis of medical device adverse event reports and of peer-reviewed, scientific literature, the most common adverse events for all surgical repair of hernias--with or without mesh--are pain, infection, hernia recurrence, scar-like tissue that sticks tissues together (adhesion), blockage of the large or small intestine (obstruction), bleeding, abnormal connection between organs, vessels, or intestines (fistula), fluid build-up at the surgical site (seroma), and a hole in neighboring tissues or organs (perforation).  Some other potential adverse events that can occur following hernia repair with mesh are mesh migration and mesh shrinkage (contraction).    http://www.fda.gov/MedicalDevices/ProductsandMedicalProcedures/ImplantsandProsthetics/HerniaSurgicalMesh/default.htm      There is heightened risk of " "recurrence due to higher BMI and I emphasized the problem with abdominal wall tension to Maddy.      PLAN:  Hernia surgery indicated.  75 minutes; PAC for preop; same day surgery; [address] [place] [address].    Call Chema at [phone] for scheduling.    Note that I will use robotic assistance; hence, \"Laparoscopic umbilical hernia repair, robotic assist\"    Same day surgery; 75 minutes.        Sincerely,    Elías Montero MD    "

## 2022-07-07 NOTE — NURSING NOTE
"Chief Complaint   Patient presents with     New Patient     Enlarging umbilical hernia       Vitals:    07/07/22 0659   BP: 126/82   BP Location: Left arm   Patient Position: Sitting   Cuff Size: Adult Large   Pulse: 91   SpO2: 98%   Weight: 114.7 kg (252 lb 14.4 oz)   Height: 1.6 m (5' 3\")       Body mass index is 44.8 kg/m .                          Maynor Martin, EMT    "

## 2022-07-08 ENCOUNTER — TELEPHONE (OUTPATIENT)
Dept: SURGERY | Facility: CLINIC | Age: 37
End: 2022-07-08

## 2022-07-08 DIAGNOSIS — E03.8 OTHER SPECIFIED HYPOTHYROIDISM: ICD-10-CM

## 2022-07-08 PROBLEM — K42.9 UMBILICAL HERNIA WITHOUT OBSTRUCTION AND WITHOUT GANGRENE: Status: ACTIVE | Noted: 2021-10-26

## 2022-07-08 NOTE — TELEPHONE ENCOUNTER
Patient called to schedule umbilical hernia repair with Dr. Montero, seen in clinic yesterday. Scheduled 9/28 at 7:15 a.m. at the [facility], to arrive 5:45 a.m.   Discussed need for at-home COVID testing to be done 9/26. Patient would rather have a clinic appointment to have this testing done. Discussed it cannot be more than 4 days prior to surgery date.

## 2022-07-08 NOTE — TELEPHONE ENCOUNTER
"Requested Prescriptions   Pending Prescriptions Disp Refills    levothyroxine (SYNTHROID/LEVOTHROID) 50 MCG tablet [Pharmacy Med Name: LEVOTHYROXINE 50 MCG TABLET] 30 tablet 0     Sig: TAKE 1 TABLET BY MOUTH EVERY MORNING (BEFORE BREAKFAST)        Thyroid Protocol Failed - 7/8/2022 12:52 PM        Failed - Normal TSH on file in past 12 months     Recent Labs   Lab Test 06/17/22  2354   TSH 9.74*                Passed - Patient is 12 years or older        Passed - Recent (12 mo) or future (30 days) visit within the authorizing provider's specialty     Patient has had an office visit with the authorizing provider or a provider within the authorizing providers department within the previous 12 mos or has a future within next 30 days. See \"Patient Info\" tab in inbasket, or \"Choose Columns\" in Meds & Orders section of the refill encounter.              Passed - Medication is active on med list        Passed - No active pregnancy on record     If patient is pregnant or has had a positive pregnancy test, please check TSH.          Passed - No positive pregnancy test in past 12 months     If patient is pregnant or has had a positive pregnancy test, please check TSH.                "

## 2022-07-11 DIAGNOSIS — Z20.822 ENCOUNTER FOR LABORATORY TESTING FOR COVID-19 VIRUS: ICD-10-CM

## 2022-07-11 DIAGNOSIS — K42.9 UMBILICAL HERNIA WITHOUT OBSTRUCTION AND WITHOUT GANGRENE: Primary | ICD-10-CM

## 2022-07-11 RX ORDER — LEVOTHYROXINE SODIUM 50 UG/1
TABLET ORAL
Qty: 30 TABLET | Refills: 0 | Status: SHIPPED | OUTPATIENT
Start: 2022-07-11 | End: 2022-08-10

## 2022-07-12 ENCOUNTER — TELEPHONE (OUTPATIENT)
Dept: FAMILY MEDICINE | Facility: CLINIC | Age: 37
End: 2022-07-12

## 2022-07-12 NOTE — TELEPHONE ENCOUNTER
"S-(situation): Patient calling stating she is experiencing disassociation and psychosomatic symptoms.     B-(background): Schizoaffective disorder, suicidal ideation  Patient says she had an appt today with her therapist. Already has a VV with her PCP tomorrow and with her ARMS worker at noon tomorrow.     A-(assessment): Patient says her symptoms are becoming more frequent lately. She states she is \"self isolating as encouraged by herself and her community\". She feels she \"needs to isolate because of her ethnicity and her political beliefs\". She says she is \"feeling  frustrated but not angry or disruptive\". She wants to \"set boundaries and leave family relationships because my behavior is destructive to them\". When asked to explain that, she says, \"In the past I've avoided them and verbally lashed out.\" Patient talked about pregnancy, is not pregnant now. Has a 14 year old.   Says when she exercises her rights ie: vocalize discomfort to her health care provider, her neighbors hear. She says she has a neighbor who is slamming her door right now and another neighbor who has a toxic apt and the smell comes into the hallway. She states she knows she can call the police to do a well check on her neighbor. She says she feels disconnected because of a lack of grounding. Her physical symptoms are \"a lot of pain, like if I got up I would bleed to death.\" She says she is up and moving though and is feeling better since talking to writer. Writer asked more than once if she had any thoughts of self harm, harming someone else or of killing herself or someone else at this time and she states no. Patient declines crisis phone or text numbers at this time. Notified patient she can call the clinic number and a triage nurse is available 24/7. She verbalizes understanding.      R-(recommendations): Advised patient to write down her concerns to discuss with PCP tomorrow. Call 911 if something changes between now and then and she " doesn't feel safe. She is in agreement of plan.  Cassidy JOHNSON RN

## 2022-07-13 ENCOUNTER — VIRTUAL VISIT (OUTPATIENT)
Dept: FAMILY MEDICINE | Facility: CLINIC | Age: 37
End: 2022-07-13
Payer: MEDICARE

## 2022-07-13 DIAGNOSIS — E11.9 TYPE 2 DIABETES MELLITUS WITHOUT COMPLICATION, WITHOUT LONG-TERM CURRENT USE OF INSULIN (H): ICD-10-CM

## 2022-07-13 DIAGNOSIS — F25.0 SCHIZOAFFECTIVE DISORDER, BIPOLAR TYPE (H): Primary | ICD-10-CM

## 2022-07-13 PROCEDURE — 99214 OFFICE O/P EST MOD 30 MIN: CPT | Mod: 95 | Performed by: NURSE PRACTITIONER

## 2022-07-13 NOTE — PROGRESS NOTES
Maddy is a 36 year old who is being evaluated via a billable video visit.      How would you like to obtain your AVS? [employer]  If the video visit is dropped, the invitation should be resent by: Text to cell phone: [phone]  Will anyone else be joining your video visit? No          Assessment & Plan     Schizoaffective disorder, bipolar type (H)  Poorly controlled.  Encourage patient to reach out to her [employer] worker today.  Keep follow-up appointment with psychiatry tomorrow.    Type 2 diabetes mellitus without complication, without long-term current use of insulin (H)  Patient will check her blood sugars 2-3 times per day for the next week.  She will send a [employer] message with her blood sugar readings.  We may be able to increase the Victoza to 1.8 mg daily, however will wait for blood sugar readings before making a final decision.      The risks, benefits and treatment options of prescribed medications or other treatments have been discussed with the patient. The patient verbalized their understanding and should call or follow up if no improvement or if they develop further problems.    PHILL Luo [facility]   Maddy is a 36 year old, presenting for the following health issues:  Mental Health Problem (Concerns regarding mental health)      HPI   Chief Complaint   Patient presents with     Mental Health Problem     Concerns regarding mental health     Patient reports that she is having a lot of frustrations with her current living situation in regards to neighbors being disrespectful.  She does not feel her mental health is well controlled.  She feels she is having too many side effects from her psych medications.  Specifically her medications causing extreme fatigue.  She has an [employer] worker but has not connected with her lately.  She has a psychiatrist that she meets with weekly, next appointment is tomorrow.  She denies any suicidal thoughts.  She  feels safe in her environment today.        Diabetes:  Patient and diabetes educator are interested in increasing the Victoza  Patient hasn't been checking her blood sugars.  She is willing to start checking her sugars 2-3 times per day for the next 7 days and send a My Chart message.              Review of Systems   Constitutional, HEENT, cardiovascular, pulmonary, gi and gu systems are negative, except as otherwise noted.      Objective           Vitals:  No vitals were obtained today due to virtual visit.    Physical Exam   GENERAL: Healthy, alert and no distress  EYES: Eyes grossly normal to inspection.  No discharge or erythema, or obvious scleral/conjunctival abnormalities.  RESP: No audible wheeze, cough, or visible cyanosis.  No visible retractions or increased work of breathing.    SKIN: Visible skin clear. No significant rash, abnormal pigmentation or lesions.  NEURO: Cranial nerves grossly intact.  Mentation and speech appropriate for age.  PSYCH: Mentation appears normal, affect normal/bright, judgement and insight intact, normal speech and appearance well-groomed.                Video-Visit Details    Video Start Time: 11:41 AM    Type of service:  Video Visit    Video End Time:11:55 AM    Originating Location (pt. Location): Home    Distant Location (provider location):  [facility]     Platform used for Video Visit: [employer]    .  ..

## 2022-07-13 NOTE — TELEPHONE ENCOUNTER
Please call patient this morning and make sure that she has followed up with her ARM worker.  She also should be following up with her psychiatrist today.    If patient sounds unsafe to the nurse, police should be called to do a wellness check.  Shirley Freeman, CNP

## 2022-07-19 ENCOUNTER — NURSE TRIAGE (OUTPATIENT)
Dept: NURSING | Facility: CLINIC | Age: 37
End: 2022-07-19

## 2022-07-20 NOTE — TELEPHONE ENCOUNTER
Maddy states she is having a difficult time with her psychiatric meds - Specifically with Zyprexa    She reports that the medication is very sedating for her. She stays sedated for 21 hrs  She wants to stop taking the medication    She has missed 2-3 appointments in the past couple days because she overslept.    She wants to know if she can skip her dose tonight so that she can be more alert for her appointment tomorrow.  Her appointment tomorrow afternoon is with her Psychiatric provider who is with [facility]    Advised to   - Discuss medication with prescribing provider, including a Safe plan to taper off of the medication  - Set multiple alarms to help her get up for the afternoon appointment  - Seek assistance from family/friends to call her to make sure she gets up    COVID 19 Nurse Triage Plan/Patient Instructions    Please be aware that novel coronavirus (COVID-19) may be circulating in the community. If you develop symptoms such as fever, cough, or SOB or if you have concerns about the presence of another infection including coronavirus (COVID-19), please contact your health care provider or visit [email or web address].     Disposition/Instructions    Virtual Visit with provider recommended. Reference Visit Selection Guide.  In-Person Visit with provider recommended. Reference Visit Selection Guide.    Thank you for taking steps to prevent the spread of this virus.  o Limit your contact with others.  o Wear a simple mask to cover your cough.  o Wash your hands well and often.    Resources    [facility]: About COVID-19: [email or web address]    CDC: What to Do If You're Sick: www.cdc.gov/coronavirus/2019-ncov/about/steps-when-sick.html    CDC: Ending Home Isolation: www.cdc.gov/coronavirus/2019-ncov/hcp/disposition-in-home-patients.html     CDC: Caring for Someone: www.cdc.gov/coronavirus/2019-ncov/if-you-are-sick/care-for-someone.html     [facility]: Interim Guidance for Hospital  Discharge to Home: www.health.[facility].mn.us/diseases/coronavirus/hcp/hospdischarge.pdf    [facility] clinical trials (COVID-19 research studies): clinicalaffairs.[facility]/umn-clinical-trials     Below are the COVID-19 hotlines at the [place] Department of Health ([facility]). Interpreters are available.   o For health questions: Call 097-387-0362 or 1-248.605.7192 (7 a.m. to 7 p.m.)  o For questions about schools and childcare: Call 552-935-4054 or 1-371.861.3757 (7 a.m. to 7 p.m.)     Bekah Franco RN  [facility]      Reason for Disposition    [1] Caller has medication question about med not prescribed by PCP AND [2] triager unable to answer question (e.g., compatibility with other med, storage)    Protocols used: MEDICATION QUESTION CALL-A-

## 2022-07-22 ENCOUNTER — OFFICE VISIT (OUTPATIENT)
Dept: ALLERGY | Facility: CLINIC | Age: 37
End: 2022-07-22

## 2022-07-22 ENCOUNTER — HOSPITAL ENCOUNTER (EMERGENCY)
Facility: CLINIC | Age: 37
Discharge: HOME | End: 2022-07-22
Attending: FAMILY MEDICINE | Admitting: FAMILY MEDICINE
Payer: MEDICARE

## 2022-07-22 ENCOUNTER — MYC MEDICAL ADVICE (OUTPATIENT)
Dept: EDUCATION SERVICES | Facility: CLINIC | Age: 37
End: 2022-07-22

## 2022-07-22 VITALS
SYSTOLIC BLOOD PRESSURE: 116 MMHG | HEART RATE: 99 BPM | WEIGHT: 245.15 LBS | TEMPERATURE: 98.9 F | DIASTOLIC BLOOD PRESSURE: 82 MMHG | BODY MASS INDEX: 43.43 KG/M2 | OXYGEN SATURATION: 95 %

## 2022-07-22 VITALS
DIASTOLIC BLOOD PRESSURE: 71 MMHG | OXYGEN SATURATION: 97 % | HEART RATE: 90 BPM | RESPIRATION RATE: 16 BRPM | SYSTOLIC BLOOD PRESSURE: 117 MMHG

## 2022-07-22 DIAGNOSIS — J30.81 ALLERGIC RHINITIS DUE TO ANIMALS: ICD-10-CM

## 2022-07-22 DIAGNOSIS — H10.13 ALLERGIC CONJUNCTIVITIS OF BOTH EYES: ICD-10-CM

## 2022-07-22 DIAGNOSIS — J30.89 ALLERGIC RHINITIS DUE TO DUST MITE: ICD-10-CM

## 2022-07-22 DIAGNOSIS — R53.83 OTHER FATIGUE: ICD-10-CM

## 2022-07-22 DIAGNOSIS — E11.9 TYPE 2 DIABETES MELLITUS WITHOUT COMPLICATION, WITHOUT LONG-TERM CURRENT USE OF INSULIN (H): ICD-10-CM

## 2022-07-22 DIAGNOSIS — J30.89 ALLERGIC RHINITIS CAUSED BY MOLD: ICD-10-CM

## 2022-07-22 DIAGNOSIS — G47.10 EXCESSIVE SLEEPINESS: ICD-10-CM

## 2022-07-22 DIAGNOSIS — J30.1 SEASONAL ALLERGIC RHINITIS DUE TO POLLEN: Primary | ICD-10-CM

## 2022-07-22 DIAGNOSIS — R06.02 SHORTNESS OF BREATH: ICD-10-CM

## 2022-07-22 LAB
ALBUMIN SERPL-MCNC: 3.7 G/DL (ref 3.4–5)
ALP SERPL-CCNC: 88 U/L (ref 40–150)
ALT SERPL W P-5'-P-CCNC: 102 U/L (ref 0–50)
ANION GAP SERPL CALCULATED.3IONS-SCNC: 6 MMOL/L (ref 3–14)
AST SERPL W P-5'-P-CCNC: 106 U/L (ref 0–45)
BASOPHILS # BLD AUTO: 0 10E3/UL (ref 0–0.2)
BASOPHILS NFR BLD AUTO: 0 %
BILIRUB SERPL-MCNC: 0.4 MG/DL (ref 0.2–1.3)
BUN SERPL-MCNC: 11 MG/DL (ref 7–30)
CALCIUM SERPL-MCNC: 8.8 MG/DL (ref 8.5–10.1)
CHLORIDE BLD-SCNC: 106 MMOL/L (ref 94–109)
CO2 SERPL-SCNC: 26 MMOL/L (ref 20–32)
CREAT SERPL-MCNC: 0.74 MG/DL (ref 0.52–1.04)
EOSINOPHIL # BLD AUTO: 0.3 10E3/UL (ref 0–0.7)
EOSINOPHIL NFR BLD AUTO: 4 %
ERYTHROCYTE [DISTWIDTH] IN BLOOD BY AUTOMATED COUNT: 12.5 % (ref 10–15)
GFR SERPL CREATININE-BSD FRML MDRD: >90 ML/MIN/1.73M2
GLUCOSE BLD-MCNC: 125 MG/DL (ref 70–99)
HCT VFR BLD AUTO: 37.4 % (ref 35–47)
HGB BLD-MCNC: 12.2 G/DL (ref 11.7–15.7)
IMM GRANULOCYTES # BLD: 0.1 10E3/UL
IMM GRANULOCYTES NFR BLD: 1 %
LITHIUM SERPL-SCNC: 0.4 MMOL/L
LYMPHOCYTES # BLD AUTO: 1.7 10E3/UL (ref 0.8–5.3)
LYMPHOCYTES NFR BLD AUTO: 18 %
MAGNESIUM SERPL-MCNC: 2.4 MG/DL (ref 1.6–2.3)
MCH RBC QN AUTO: 29.8 PG (ref 26.5–33)
MCHC RBC AUTO-ENTMCNC: 32.6 G/DL (ref 31.5–36.5)
MCV RBC AUTO: 91 FL (ref 78–100)
MONOCYTES # BLD AUTO: 0.7 10E3/UL (ref 0–1.3)
MONOCYTES NFR BLD AUTO: 7 %
NEUTROPHILS # BLD AUTO: 6.6 10E3/UL (ref 1.6–8.3)
NEUTROPHILS NFR BLD AUTO: 70 %
NRBC # BLD AUTO: 0 10E3/UL
NRBC BLD AUTO-RTO: 0 /100
PLATELET # BLD AUTO: 145 10E3/UL (ref 150–450)
POTASSIUM BLD-SCNC: 3.7 MMOL/L (ref 3.4–5.3)
PROT SERPL-MCNC: 7.6 G/DL (ref 6.8–8.8)
RBC # BLD AUTO: 4.09 10E6/UL (ref 3.8–5.2)
SODIUM SERPL-SCNC: 138 MMOL/L (ref 133–144)
WBC # BLD AUTO: 9.4 10E3/UL (ref 4–11)

## 2022-07-22 PROCEDURE — 85014 HEMATOCRIT: CPT | Performed by: FAMILY MEDICINE

## 2022-07-22 PROCEDURE — 93005 ELECTROCARDIOGRAM TRACING: CPT | Performed by: FAMILY MEDICINE

## 2022-07-22 PROCEDURE — 83735 ASSAY OF MAGNESIUM: CPT | Performed by: FAMILY MEDICINE

## 2022-07-22 PROCEDURE — 36415 COLL VENOUS BLD VENIPUNCTURE: CPT | Performed by: FAMILY MEDICINE

## 2022-07-22 PROCEDURE — 93010 ELECTROCARDIOGRAM REPORT: CPT | Performed by: FAMILY MEDICINE

## 2022-07-22 PROCEDURE — 80053 COMPREHEN METABOLIC PANEL: CPT | Performed by: FAMILY MEDICINE

## 2022-07-22 PROCEDURE — 80178 ASSAY OF LITHIUM: CPT | Performed by: FAMILY MEDICINE

## 2022-07-22 PROCEDURE — 99284 EMERGENCY DEPT VISIT MOD MDM: CPT | Performed by: FAMILY MEDICINE

## 2022-07-22 PROCEDURE — 99214 OFFICE O/P EST MOD 30 MIN: CPT | Performed by: ALLERGY & IMMUNOLOGY

## 2022-07-22 PROCEDURE — 99284 EMERGENCY DEPT VISIT MOD MDM: CPT | Mod: 25 | Performed by: FAMILY MEDICINE

## 2022-07-22 RX ORDER — BUDESONIDE AND FORMOTEROL FUMARATE DIHYDRATE 80; 4.5 UG/1; UG/1
2 AEROSOL RESPIRATORY (INHALATION) 2 TIMES DAILY
Qty: 10.2 G | Refills: 3 | Status: SHIPPED | OUTPATIENT
Start: 2022-07-22 | End: 2022-10-05

## 2022-07-22 RX ORDER — LIRAGLUTIDE 6 MG/ML
1.8 INJECTION SUBCUTANEOUS DAILY
Qty: 9 ML | Refills: 3 | Status: SHIPPED | OUTPATIENT
Start: 2022-07-22 | End: 2022-11-07

## 2022-07-22 ASSESSMENT — ENCOUNTER SYMPTOMS
SINUS PRESSURE: 0
RHINORRHEA: 0
EYE ITCHING: 0
NUMBNESS: 1
HEADACHES: 0
WHEEZING: 0
FATIGUE: 1
VOMITING: 0
SINUS PAIN: 0
NAUSEA: 0
EYE REDNESS: 0
COUGH: 0
SHORTNESS OF BREATH: 0
CHEST TIGHTNESS: 0
DIARRHEA: 0
EYE DISCHARGE: 0
SORE THROAT: 0
FACIAL SWELLING: 0

## 2022-07-22 NOTE — PATIENT INSTRUCTIONS
Allergy Staff Appt Hours Shot Hours Locations    Physician     Rudy Shin MD       Support Staff     Callie RN     Simona RN     Kelby LPN     Quinn CMA    Tuesday:   Amanda :  [facility]: :         [facility]  [place] [address]  [place]        Thursday: :        Friday: 7-12:20     [place]        Tuesday: : : :: :: :    [place]       Tues & Wed: :       Mon & Thurs: :       Fri: :           [facility]  [address] [place] [address]  Appt Line: [phone]      [facility]  [address] [place] [address]  Appt Line: [phone]    Pulmonary Function Scheduling:  [place]: [phone]  [place]: [phone]  [phone]     Important Scheduling Information (if recommended by provider):  Aspirin Desensitization: Appt will last 2 clinic days. Please call the Allergy RN line for your clinic to schedule. Discontinue antihistamines 7 days prior to the appointment.     Food Challenges: Appt will last 3-4 hours. Please call the Allergy RN line for your clinic to schedule. Discontinue antihistamines 7 days prior to the appointment.     Penicillin Testing: Appt will last 2-3 hours. Please call the Allergy RN line for your clinic to schedule. Discontinue antihistamines 7 days prior to the appointment.     Skin Testing: Appt will about 40 minutes. Call the appointment line for your clinic to schedule. Discontinue antihistamines 7 days prior to the appointment.     Thank you for trusting us with your Allergy, Asthma, and Immunology care. Please feel free to contact us with any questions or concerns you may have.      [facility] Prescription Assistance Program [phone]

## 2022-07-22 NOTE — ED PROVIDER NOTES
History     Chief Complaint   Patient presents with     Generalized Weakness     HPI  Maddy Ortiz is a 36 year old female, past medical history is significant for polypharmacy, disorganized behavior, type 2 diabetes, fatty liver, schizoaffective disorder, morbid obesity, paranoid psychosis, GERD, seasonal allergic rhinitis, depression with anxiety, insomnia, bipolar 1 disorder, presents to the emergency department from allergy clinic where a rapid response was called.  History from the patient is somewhat limited as she is very drowsy having taken her usual medications at 10 PM at 3 in the morning instead.  These medications include Zyprexa, Atarax, gabapentin, and lithium.  She states she is never done that before.  She had an appointment with allergy this morning she states not for allergy shots and she did not receive any allergy shots but rather to get a refill of her medication.  She complained of fatigue there as well as apparent tingling in her bilateral arms.  There were no vital sign abnormalities.  She had no complaints of chest pain or shortness of air.      Allergies:  Allergies   Allergen Reactions     Dust Mites Shortness Of Breath     Animal Dander      Other reaction(s): *Unknown     Metformin Fatigue     Patient is taking extended release at home as of 3/3/22     Mold      Other reaction(s): Runny Nose     Trees        Problem List:    Patient Active Problem List    Diagnosis Date Noted     Overdose, undetermined intent, initial encounter 06/18/2022     Priority: Medium     Polypharmacy 04/25/2022     Priority: Medium     Sedated due to multiple medications 04/25/2022     Priority: Medium     Elevated LFTs 01/08/2022     Priority: Medium     Disorganized behavior 01/08/2022     Priority: Medium     Infection due to 2019 novel coronavirus 01/08/2022     Priority: Medium     Diabetes mellitus, type 2 (H) 12/13/2021     Priority: Medium     Fatty liver 11/05/2021     Priority: Medium     Umbilical  hernia without obstruction and without gangrene 10/26/2021     Priority: Medium     Schizoaffective disorder (H) 07/13/2021     Priority: Medium     Morbid obesity (H) 01/02/2019     Priority: Medium     Paranoia (psychosis) (H) 08/24/2018     Priority: Medium     Gastroesophageal reflux disease without esophagitis 06/08/2018     Priority: Medium     Schizoaffective disorder, bipolar type (H) 05/07/2018     Priority: Medium     Encounter for IUD insertion 09/15/2017     Priority: Medium     4/24/22 bryanna insertion lot# [id number] exp-4/2023       Seasonal allergic rhinitis due to pollen 11/04/2016     Priority: Medium     Allergic rhinitis due to mold 11/04/2016     Priority: Medium     Allergic rhinitis due to animal dander 11/04/2016     Priority: Medium     Allergic rhinitis due to dust mite 11/04/2016     Priority: Medium     Depression with anxiety 01/26/2015     Priority: Medium     Insomnia 07/18/2013     Priority: Medium     Bipolar 1 disorder (H) 12/06/2012     Priority: Medium     Planning on seeing Purvi (psychiatric nurse)       Paranoid type delusional disorder (H) 11/14/2012     Priority: Medium     Psychosis (H) 10/16/2012     Priority: Medium     Animal dander allergy 05/09/2012     Priority: Medium     Dog and Cat       CARDIOVASCULAR SCREENING; LDL GOAL LESS THAN 160 05/09/2012     Priority: Medium        Past Medical History:    Past Medical History:   Diagnosis Date     Bipolar 1 disorder (H) 12/6/2012     Depressive disorder      Diabetes mellitus, type 2 (H) 12/13/2021     Paranoid type delusional disorder (H) 11/14/2012       Past Surgical History:    Past Surgical History:   Procedure Laterality Date     TONSILLECTOMY & ADENOIDECTOMY      as a child     TONSILLECTOMY & ADENOIDECTOMY         Family History:    Family History   Adopted: Yes   Problem Relation Age of Onset     Unknown/Adopted Mother      Unknown/Adopted Father      Unknown/Adopted Other      Hypertension Sister        Social  History:  Marital Status:  Single [1]  Social History     Tobacco Use     Smoking status: Former Smoker     Packs/day: 0.50     Types: Cigarettes     Smokeless tobacco: Former User     Tobacco comment: around 2nd hand smoke   Vaping Use     Vaping Use: Never used   Substance Use Topics     Alcohol use: Not Currently     Drug use: Not Currently        Medications:    acetaminophen (TYLENOL) 325 MG tablet  albuterol (PROAIR HFA/PROVENTIL HFA/VENTOLIN HFA) 108 (90 Base) MCG/ACT inhaler  ammonium lactate (LAC-HYDRIN) 12 % external cream  azelastine (ASTELIN) 0.1 % nasal spray  blood glucose (NO BRAND SPECIFIED) test strip  budesonide-formoterol (SYMBICORT) 80-4.5 MCG/ACT Inhaler  cetirizine (ZYRTEC) 10 MG tablet  EPINEPHrine (ANY BX GENERIC EQUIV) 0.3 MG/0.3ML injection 2-pack  fluticasone (FLONASE) 50 MCG/ACT nasal spray  gabapentin (NEURONTIN) 300 MG capsule  hydrOXYzine (ATARAX) 25 MG tablet  insulin pen needle (32G X 4 MM) 32G X 4 MM miscellaneous  levothyroxine (SYNTHROID/LEVOTHROID) 50 MCG tablet  liraglutide (VICTOZA) 18 MG/3ML solution  lithium (ESKALITH) 150 MG capsule  mineral oil-hydrophilic petrolatum (AQUAPHOR) external ointment  OLANZapine (ZYPREXA) 5 MG tablet  risperiDONE microspheres ER (RISPERDAL CONSTA) 50 MG injection  terbinafine (LAMISIL AT) 1 % external cream          Review of Systems   All other systems reviewed and are negative.      Physical Exam   BP: 111/78  Pulse: 90  Resp: 16  SpO2: 95 %      Physical Exam  Vitals and nursing note reviewed.   Constitutional:       General: She is not in acute distress.     Appearance: She is obese. She is not ill-appearing.      Comments: The patient is drowsy when into the room but easily arousable.  She answers questions appropriately and then falls asleep again.   HENT:      Head: Normocephalic and atraumatic.      Right Ear: Tympanic membrane, ear canal and external ear normal.      Left Ear: Tympanic membrane, ear canal and external ear normal.       Nose: Nose normal.      Mouth/Throat:      Mouth: Mucous membranes are dry.      Pharynx: Oropharynx is clear.   Eyes:      Extraocular Movements: Extraocular movements intact.      Conjunctiva/sclera: Conjunctivae normal.      Pupils: Pupils are equal, round, and reactive to light.   Cardiovascular:      Rate and Rhythm: Normal rate and regular rhythm.      Pulses: Normal pulses.      Heart sounds: Normal heart sounds.   Pulmonary:      Effort: Pulmonary effort is normal.      Breath sounds: Normal breath sounds.   Abdominal:      General: Bowel sounds are normal.      Palpations: Abdomen is soft.   Musculoskeletal:         General: Normal range of motion.      Cervical back: Normal range of motion and neck supple.   Skin:     General: Skin is warm and dry.      Capillary Refill: Capillary refill takes less than 2 seconds.   Neurological:      General: No focal deficit present.      Mental Status: She is oriented to person, place, and time.   Psychiatric:         Mood and Affect: Mood normal.         Behavior: Behavior normal.         ED Course               EKG Interpretation:      Interpreted by Blaine Tinsley MD  Time reviewed: Time obtained 1305 time interpreted same.  80 bpm sinus rhythm no acute ST-T wave changes, normal axis, normal intervals.  Impression normal EKG.       Procedures              Critical Care time:  none               Results for orders placed or performed during the hospital encounter of 07/22/22 (from the past 24 hour(s))   CBC with platelets, differential    Narrative    The following orders were created for panel order CBC with platelets, differential.  Procedure                               Abnormality         Status                     ---------                               -----------         ------                     CBC with platelets and d...[[id number]]  Abnormal            Final result                 Please view results for these tests on the individual orders.    Comprehensive metabolic panel   Result Value Ref Range    Sodium 138 133 - 144 mmol/L    Potassium 3.7 3.4 - 5.3 mmol/L    Chloride 106 94 - 109 mmol/L    Carbon Dioxide (CO2) 26 20 - 32 mmol/L    Anion Gap 6 3 - 14 mmol/L    Urea Nitrogen 11 7 - 30 mg/dL    Creatinine 0.74 0.52 - 1.04 mg/dL    Calcium 8.8 8.5 - 10.1 mg/dL    Glucose 125 (H) 70 - 99 mg/dL    Alkaline Phosphatase 88 40 - 150 U/L     (H) 0 - 45 U/L     (H) 0 - 50 U/L    Protein Total 7.6 6.8 - 8.8 g/dL    Albumin 3.7 3.4 - 5.0 g/dL    Bilirubin Total 0.4 0.2 - 1.3 mg/dL    GFR Estimate >90 >60 mL/min/1.73m2   Magnesium   Result Value Ref Range    Magnesium 2.4 (H) 1.6 - 2.3 mg/dL   Lithium level   Result Value Ref Range    Lithium 0.4   mmol/L   CBC with platelets and differential   Result Value Ref Range    WBC Count 9.4 4.0 - 11.0 10e3/uL    RBC Count 4.09 3.80 - 5.20 10e6/uL    Hemoglobin 12.2 11.7 - 15.7 g/dL    Hematocrit 37.4 35.0 - 47.0 %    MCV 91 78 - 100 fL    MCH 29.8 26.5 - 33.0 pg    MCHC 32.6 31.5 - 36.5 g/dL    RDW 12.5 10.0 - 15.0 %    Platelet Count 145 (L) 150 - 450 10e3/uL    % Neutrophils 70 %    % Lymphocytes 18 %    % Monocytes 7 %    % Eosinophils 4 %    % Basophils 0 %    % Immature Granulocytes 1 %    NRBCs per 100 WBC 0 <1 /100    Absolute Neutrophils 6.6 1.6 - 8.3 10e3/uL    Absolute Lymphocytes 1.7 0.8 - 5.3 10e3/uL    Absolute Monocytes 0.7 0.0 - 1.3 10e3/uL    Absolute Eosinophils 0.3 0.0 - 0.7 10e3/uL    Absolute Basophils 0.0 0.0 - 0.2 10e3/uL    Absolute Immature Granulocytes 0.1 <=0.4 10e3/uL    Absolute NRBCs 0.0 10e3/uL     2:12 PM  Results reviewed in the room with the patient who was again asleep when I entered the room but arousable.  All questions were answered disposition is to home.  It seems rather straightforward that this patient is as symptomatic as she is with the timing of her medication.  A broader differential was taken into consideration with this patient's presentation.  No concerning  explanation for her tiredness/sleepiness other than the suspected timing of her medication.  Disposition is to home.    Medications - No data to display    Assessments & Plan (with Medical Decision Making)   Assessments and plan with medical decision making at the time stamp above.    Disclaimer: This note consists of symbols derived from keyboarding, dictation and/or voice recognition software. As a result, there may be errors in the script that have gone undetected. Please consider this when interpreting information found in this chart.        I have reviewed the nursing notes.    I have reviewed the findings, diagnosis, plan and need for follow up with the patient.          New Prescriptions    No medications on file       Final diagnoses:   Excessive sleepiness - Secondary to medication timing       7/22/2022   [facility] EMERGENCY DEPT     Blaine Tinsley MD  07/22/22 1414       Blaine Tinsley MD  08/15/22 0684

## 2022-07-22 NOTE — LETTER
7/22/2022         RE: Maddy Ortiz  [address]  [facility] [address]        Dear Colleague,    Thank you for referring your patient, Maddy Ortiz, to the [facility]. Please see a copy of my visit note below.    SUBJECTIVE:                                                                   Maddy Ortiz presents today to our Allergy Clinic at [facility] for a follow up visit. She is a 36 year old female with with allergic rhinitis and episodes of shortness of breath on exertion.   Percutaneous skin puncture testing for aeroallergens performed in November 2016 showed sensitivity to dog, cat, dust mite, molds, pollen of trees, grass, and weeds. In spring-summer 2018, she had a buildup using cluster schedule for allergen immunotherapy.  She reached maintenance dose in July 2018.  One episode of anaphylaxis on September 8, 2020, due to allergen immunotherapy, Red 1:1, 0.5mL   Was given epi x 2.  She tolerated a lower dose x 1 after that. She stopped taking hydroxyzine.  Take cetirizine 10 mg by mouth once daily.  Sometimes, when symptoms are worse, she may take 20 mg by mouth.  She stopped allergen immunotherapy after September 22, 2020.  She has a history of dyspnea on exertion. She was asked several times if albuterol inhaler was helpful or not, and she was not sure. In November, she had PFT, which showed a mild obstructive/restrictive pattern. There was a questionable postbronchodilator partial reversibility.    Maddy was lying on the exam bed when I entered the room. She complained of extreme fatigue and some tingling sensation in her right hand. She was tired this morning, but fatigue progressed really fast within the past 30 minutes.   States she never felt so tired in the past. Looked very sleepy.  While she looked exhausted with her eyes closed, she was easily arousable and able to answer questions.  She states that there are some changes in  her mental health medications. She is unsure if that is the reason for her symptoms.  Considering her symptoms, I called rapid response.  While we were waiting for rapid response, I asked her some questions in regards to her symptoms related to allergic rhinoconjunctivitis and dyspnea.    Most of the time, should take cetirizine 10 mg by mouth once daily.  Because she had somewhat persistent symptoms, she started a combination of intranasal fluticasone and azelastine nasal spray. She feels that she is well controlled on this regimen. She has no concerns in regards to rhinoconjunctivitis.    Regarding dyspnea on exertion, in the past, there was conflicting information about whether albuterol is helpful.  In the past, PFT showed a mild obstructive/restrictive pattern with a questionable postbronchodilator partial reversibility.  I suggested Maddy use Symbicort 80/4.5 mcg 2 puffs twice daily and see how she feels.  Maddy states that she frequently forgets to use Symbicort twice daily. Most of the time, she tries to use it once daily. She feels that overall, her breathing is better but cannot quantify by how much better.  She denies night awakenings due to shortness of breath or cough. Typically, dyspnea occurs after several flights of stairs or prolonged walks. Even if she does not use albuterol, she will get better and 10 minutes.    Patient Active Problem List   Diagnosis     Animal dander allergy     CARDIOVASCULAR SCREENING; LDL GOAL LESS THAN 160     Psychosis (H)     Paranoid type delusional disorder (H)     Bipolar 1 disorder (H)     Insomnia     Depression with anxiety     Seasonal allergic rhinitis due to pollen     Allergic rhinitis due to mold     Allergic rhinitis due to animal dander     Allergic rhinitis due to dust mite     Encounter for IUD insertion     Schizoaffective disorder, bipolar type (H)     Gastroesophageal reflux disease without esophagitis     Paranoia (psychosis) (H)     Morbid obesity (H)      Schizoaffective disorder (H)     Umbilical hernia without obstruction and without gangrene     Fatty liver     Diabetes mellitus, type 2 (H)     Elevated LFTs     Disorganized behavior     Infection due to 2019 novel coronavirus     Polypharmacy     Sedated due to multiple medications     Overdose, undetermined intent, initial encounter       Past Medical History:   Diagnosis Date     Bipolar 1 disorder (H) 12/6/2012     Depressive disorder      Diabetes mellitus, type 2 (H) 12/13/2021     Paranoid type delusional disorder (H) 11/14/2012      *Patient is Adopted       Problem (# of Occurrences) Relation (Name,Age of Onset)    Hypertension (1) Sister    Unknown/Adopted (3) Mother, Father, Other        Past Surgical History:   Procedure Laterality Date     TONSILLECTOMY & ADENOIDECTOMY      as a child     TONSILLECTOMY & ADENOIDECTOMY       Social History     Socioeconomic History     Marital status: Single     Spouse name: None     Number of children: None     Years of education: None     Highest education level: None   Occupational History     Employer: CURRENTLY UNEMPLOYED AT THIS TIME   Tobacco Use     Smoking status: Former Smoker     Packs/day: 0.50     Types: Cigarettes     Smokeless tobacco: Former User     Tobacco comment: around 2nd hand smoke   Vaping Use     Vaping Use: Never used   Substance and Sexual Activity     Alcohol use: Not Currently     Drug use: Not Currently     Sexual activity: Not Currently     Partners: Male     Birth control/protection: Abstinence, None   Other Topics Concern     Parent/sibling w/ CABG, MI or angioplasty before 65F 55M? No   Social History Narrative    One child    Education: some college        July 22, 2022    ENVIRONMENTAL HISTORY: [place] The home is heated with a electric furnace. They do not have central air conditioning, does have box air conditioner in the wall and has air purifier. The patient's bedroom is  furnished with stuffed animals in bed, carpeting in bedroom and fabric window coverings. Pets inside the apartment includes 1 cat. There is history of cockroach or mice infestation. There are no smokers in the house.  The apartment does not have a basement.            Review of Systems   Constitutional: Positive for fatigue.   HENT: Negative for congestion, ear discharge, ear pain, facial swelling, postnasal drip, rhinorrhea, sinus pressure, sinus pain, sneezing and sore throat.    Eyes: Negative for discharge, redness and itching.   Respiratory: Negative for cough, chest tightness, shortness of breath and wheezing.    Cardiovascular: Negative for chest pain.   Gastrointestinal: Negative for diarrhea, nausea and vomiting.   Neurological: Positive for numbness (Right hand). Negative for headaches.           Current Outpatient Medications:      acetaminophen (TYLENOL) 325 MG tablet, Take 2 tablets (650 mg) by mouth every 4 hours as needed for mild pain (to moderate pain), Disp: , Rfl:      albuterol (PROAIR HFA/PROVENTIL HFA/VENTOLIN HFA) 108 (90 Base) MCG/ACT inhaler, Inhale 2 puffs into the lungs every 4 hours as needed for wheezing or shortness of breath / dyspnea, Disp: 18 g, Rfl: 1     azelastine (ASTELIN) 0.1 % nasal spray, Spray 2 sprays into both nostrils 2 times daily as needed for rhinitis, Disp: 30 mL, Rfl: 3     blood glucose (NO BRAND SPECIFIED) test strip, Use to test blood sugar 6 times daily or as directed., Disp: 300 strip, Rfl: 11     budesonide-formoterol (SYMBICORT) 80-4.5 MCG/ACT Inhaler, Inhale 2 puffs into the lungs 2 times daily, Disp: 10.2 g, Rfl: 3     cetirizine (ZYRTEC) 10 MG tablet, Take 1-2 tablets (10-20 mg) by mouth nightly as needed for allergies or rhinitis, Disp: 90 tablet, Rfl: 0     fluticasone (FLONASE) 50 MCG/ACT nasal spray, Spray 2 sprays into both nostrils daily, Disp: 15.8 mL, Rfl: 1     gabapentin (NEURONTIN) 300 MG capsule, Take 300 mg in  in afternoon and 600 mg at  night. (Patient taking differently: Take 300 mg by mouth 3 times daily), Disp: 120 capsule, Rfl: 3     insulin pen needle (32G X 4 MM) 32G X 4 MM miscellaneous, Use 1 pen needles daily with Victoza, Disp: 50 each, Rfl: 11     levothyroxine (SYNTHROID/LEVOTHROID) 50 MCG tablet, TAKE 1 TABLET BY MOUTH EVERY MORNING (BEFORE BREAKFAST), Disp: 30 tablet, Rfl: 0     lithium (ESKALITH) 150 MG capsule, Take 300 mg in AM and 450 mg at night, Disp: 150 capsule, Rfl: 0     mineral oil-hydrophilic petrolatum (AQUAPHOR) external ointment, Apply topically daily as needed for dry skin (apply to dry skin and rash around umbilical hernia), Disp: , Rfl:      OLANZapine (ZYPREXA) 5 MG tablet, Take 5 mg by mouth At Bedtime, Disp: , Rfl:      risperiDONE microspheres ER (RISPERDAL CONSTA) 50 MG injection, Next due 6/30, Disp: , Rfl:      ammonium lactate (LAC-HYDRIN) 12 % external cream, Apply topically 2 times daily as needed for dry skin (Patient not taking: Reported on 7/22/2022), Disp: 385 g, Rfl: 3     EPINEPHrine (ANY BX GENERIC EQUIV) 0.3 MG/0.3ML injection 2-pack, Inject 0.3 mg into the muscle as needed for anaphylaxis (Patient not taking: Reported on 7/22/2022), Disp: , Rfl:      hydrOXYzine (ATARAX) 25 MG tablet, Take 1 tablet (25 mg) by mouth every 6 hours as needed for other (adjuvant pain) (Patient not taking: Reported on 7/22/2022), Disp: 40 tablet, Rfl: 0     liraglutide (VICTOZA) 18 MG/3ML solution, Inject 1.8 mg Subcutaneous daily, Disp: 9 mL, Rfl: 3     terbinafine (LAMISIL AT) 1 % external cream, Apply topically 2 times daily as needed (rash/itching.  Apply to feet.) (Patient not taking: Reported on 7/22/2022), Disp: 30 g, Rfl: 1  Immunization History   Administered Date(s) Administered     COVID-19,,Pfizer (12+ Yrs) 05/18/2021, 06/08/2021, 01/19/2022     DTAP (<7y) 03/01/1985, 06/01/1985, 07/01/1985, 07/01/1986, 07/26/1990     HPV Quadrivalent 12/12/2008, 09/06/2011     Hep B, Peds or Adolescent 02/16/1998      Historical DTP/aP 03/01/1985, 06/01/1985, 07/01/1985, 07/01/1986, 07/26/1990     Historical Hepb 02/16/1998     Influenza (IIV3) PF 12/09/2008, 12/06/2012     Influenza Vaccine IM > 6 months Valent IIV4 (Alfuria,Fluzone) 11/21/2017, 01/03/2019, 09/17/2019, 09/16/2020     MMR 02/27/1986, 07/17/1997     Poliovirus, inactivated (IPV) 06/01/1985, 07/01/1985, 01/01/1987, 07/26/1990     TDAP Vaccine (Adacel) 06/30/1997, 03/31/2008, 05/14/2010, 12/06/2012     Td (Adult), Adsorbed 06/30/1997     Allergies   Allergen Reactions     Dust Mites Shortness Of Breath     Animal Dander      Other reaction(s): *Unknown     Metformin Fatigue     Patient is taking extended release at home as of 3/3/22     Mold      Other reaction(s): Runny Nose     Trees      OBJECTIVE:                                                                 /82 (BP Location: Left arm, Patient Position: Sitting, Cuff Size: Adult Regular)   Pulse 99   Temp 98.9  F (37.2  C) (Tympanic)   Wt 111.2 kg (245 lb 2.4 oz)   LMP 06/17/2022 (Approximate)   SpO2 95%   BMI 43.43 kg/m          Physical Exam  Vitals and nursing note reviewed.   Constitutional:       General: She is sleeping.      Appearance: She is not diaphoretic.   HENT:      Head: Normocephalic and atraumatic.      Right Ear: Tympanic membrane, ear canal and external ear normal.      Left Ear: Tympanic membrane, ear canal and external ear normal.      Nose: Septal deviation (mild, to the right) present. No mucosal edema or rhinorrhea.      Mouth/Throat:      Mouth: Mucous membranes are moist.      Pharynx: Oropharynx is clear. No oropharyngeal exudate.   Eyes:      General:         Right eye: No discharge.         Left eye: No discharge.      Conjunctiva/sclera:      Right eye: Right conjunctiva is not injected.      Left eye: Left conjunctiva is not injected.   Cardiovascular:      Rate and Rhythm: Normal rate and regular rhythm.      Heart sounds: Normal heart sounds. No murmur  heard.  Pulmonary:      Effort: Pulmonary effort is normal. No respiratory distress.      Breath sounds: Normal breath sounds and air entry. No decreased breath sounds, wheezing, rhonchi or rales.   Musculoskeletal:         General: Normal range of motion.      Cervical back: Normal range of motion.   Skin:     General: Skin is warm.   Neurological:      Mental Status: She is easily aroused.      Comments: Sleepy but easily arousable   Psychiatric:         Mood and Affect: Affect is flat.         Behavior: Behavior normal.      Comments: Pressured speech.  Poor eye contact         ASSESSMENT/PLAN:    Seasonal allergic rhinitis due to pollen  Allergic rhinitis due to animals  Allergic rhinitis caused by mold  Allergic rhinitis due to dust mite  Allergic conjunctivitis of both eyes    Status post allergen immunotherapy [id number], with a history of a systemic reaction that required epi.  Currently well controlled with cetirizine 10 mg by mouth once daily, intranasal fluticasone and azelastine.  - Suggest transitioning azelastine to on as-needed basis.  Wonder if that could contribute to her fatigue.    Considering frequent issues with mental health and history of a previous systemic reaction due to allergen immunotherapy, I do not think that restarting allergen immunotherapy is in her best interest.    Shortness of breath  She will put Symbicort close to her other medications.  She will try to use it twice daily.  We will monitor her dyspnea.  - Continue using albuterol inhaler as needed.     Other fatigue  Considering excessive sleepiness and progressive fatigue, along with numbness in her right hand, I called rapid response.  The patient was taken to the ED for further evaluation and management.    Return in about 3 months (around 10/22/2022), or if symptoms worsen or fail to improve.    Thank you for allowing us to participate in the care of this patient. Please feel free to contact us if there are any questions or  concerns about the patient.    Disclaimer: This note consists of symbols derived from keyboarding, dictation and/or voice recognition software. As a result, there may be errors in the script that have gone undetected. Please consider this when interpreting information found in this chart.    Rudy Shin MD, FAAAAI, FACAAI  Allergy, Asthma and Immunology     [facility]       Again, thank you for allowing me to participate in the care of your patient.        Sincerely,        Rudy Shin MD

## 2022-07-22 NOTE — DISCHARGE INSTRUCTIONS
Please try to take your medications at the appropriate times.  Return to the emergency department if worse or changes.

## 2022-07-22 NOTE — ED NOTES
Pt was at an allergy and asthma appt when she started to complain of increased tiredness, and arm tingling.  Pt was alert and oriented upon RAT arriving.  Pt denies CP, SOA.      Pt informs RN that she took her zyprexa after 3am today when she usually takes it around 10pm.      RN updated MD.

## 2022-07-22 NOTE — PROGRESS NOTES
SUBJECTIVE:                                                                   Maddy Ortiz presents today to our Allergy Clinic at [facility] for a follow up visit. She is a 36 year old female with with allergic rhinitis and episodes of shortness of breath on exertion.   Percutaneous skin puncture testing for aeroallergens performed in November 2016 showed sensitivity to dog, cat, dust mite, molds, pollen of trees, grass, and weeds. In spring-summer 2018, she had a buildup using cluster schedule for allergen immunotherapy.  She reached maintenance dose in July 2018.  One episode of anaphylaxis on September 8, 2020, due to allergen immunotherapy, Red 1:1, 0.5mL   Was given epi x 2.  She tolerated a lower dose x 1 after that. She stopped taking hydroxyzine.  Take cetirizine 10 mg by mouth once daily.  Sometimes, when symptoms are worse, she may take 20 mg by mouth.  She stopped allergen immunotherapy after September 22, 2020.  She has a history of dyspnea on exertion. She was asked several times if albuterol inhaler was helpful or not, and she was not sure. In November, she had PFT, which showed a mild obstructive/restrictive pattern. There was a questionable postbronchodilator partial reversibility.    Maddy was lying on the exam bed when I entered the room. She complained of extreme fatigue and some tingling sensation in her right hand. She was tired this morning, but fatigue progressed really fast within the past 30 minutes.   States she never felt so tired in the past. Looked very sleepy.  While she looked exhausted with her eyes closed, she was easily arousable and able to answer questions.  She states that there are some changes in her mental health medications. She is unsure if that is the reason for her symptoms.  Considering her symptoms, I called rapid response.  While we were waiting for rapid response, I asked her some questions in regards to her symptoms related to allergic  rhinoconjunctivitis and dyspnea.    Most of the time, should take cetirizine 10 mg by mouth once daily.  Because she had somewhat persistent symptoms, she started a combination of intranasal fluticasone and azelastine nasal spray. She feels that she is well controlled on this regimen. She has no concerns in regards to rhinoconjunctivitis.    Regarding dyspnea on exertion, in the past, there was conflicting information about whether albuterol is helpful.  In the past, PFT showed a mild obstructive/restrictive pattern with a questionable postbronchodilator partial reversibility.  I suggested Maddy use Symbicort 80/4.5 mcg 2 puffs twice daily and see how she feels.  Maddy states that she frequently forgets to use Symbicort twice daily. Most of the time, she tries to use it once daily. She feels that overall, her breathing is better but cannot quantify by how much better.  She denies night awakenings due to shortness of breath or cough. Typically, dyspnea occurs after several flights of stairs or prolonged walks. Even if she does not use albuterol, she will get better and 10 minutes.    Patient Active Problem List   Diagnosis     Animal dander allergy     CARDIOVASCULAR SCREENING; LDL GOAL LESS THAN 160     Psychosis (H)     Paranoid type delusional disorder (H)     Bipolar 1 disorder (H)     Insomnia     Depression with anxiety     Seasonal allergic rhinitis due to pollen     Allergic rhinitis due to mold     Allergic rhinitis due to animal dander     Allergic rhinitis due to dust mite     Encounter for IUD insertion     Schizoaffective disorder, bipolar type (H)     Gastroesophageal reflux disease without esophagitis     Paranoia (psychosis) (H)     Morbid obesity (H)     Schizoaffective disorder (H)     Umbilical hernia without obstruction and without gangrene     Fatty liver     Diabetes mellitus, type 2 (H)     Elevated LFTs     Disorganized behavior     Infection due to 2019 novel coronavirus     Polypharmacy      Sedated due to multiple medications     Overdose, undetermined intent, initial encounter       Past Medical History:   Diagnosis Date     Bipolar 1 disorder (H) 12/6/2012     Depressive disorder      Diabetes mellitus, type 2 (H) 12/13/2021     Paranoid type delusional disorder (H) 11/14/2012      *Patient is Adopted       Problem (# of Occurrences) Relation (Name,Age of Onset)    Hypertension (1) Sister    Unknown/Adopted (3) Mother, Father, Other        Past Surgical History:   Procedure Laterality Date     TONSILLECTOMY & ADENOIDECTOMY      as a child     TONSILLECTOMY & ADENOIDECTOMY       Social History     Socioeconomic History     Marital status: Single     Spouse name: None     Number of children: None     Years of education: None     Highest education level: None   Occupational History     Employer: CURRENTLY UNEMPLOYED AT THIS TIME   Tobacco Use     Smoking status: Former Smoker     Packs/day: 0.50     Types: Cigarettes     Smokeless tobacco: Former User     Tobacco comment: around 2nd hand smoke   Vaping Use     Vaping Use: Never used   Substance and Sexual Activity     Alcohol use: Not Currently     Drug use: Not Currently     Sexual activity: Not Currently     Partners: Male     Birth control/protection: Abstinence, None   Other Topics Concern     Parent/sibling w/ CABG, MI or angioplasty before 65F 55M? No   Social History Narrative    One child    Education: some college        July 22, 2022    ENVIRONMENTAL HISTORY: [place] The home is heated with a electric furnace. They do not have central air conditioning, does have box air conditioner in the wall and has air purifier. The patient's bedroom is furnished with stuffed animals in bed, carpeting in bedroom and fabric window coverings. Pets inside the apartment includes 1 cat. There is history of cockroach or mice infestation. There are no smokers in the house.  The apartment does not have a  basement.            Review of Systems   Constitutional: Positive for fatigue.   HENT: Negative for congestion, ear discharge, ear pain, facial swelling, postnasal drip, rhinorrhea, sinus pressure, sinus pain, sneezing and sore throat.    Eyes: Negative for discharge, redness and itching.   Respiratory: Negative for cough, chest tightness, shortness of breath and wheezing.    Cardiovascular: Negative for chest pain.   Gastrointestinal: Negative for diarrhea, nausea and vomiting.   Neurological: Positive for numbness (Right hand). Negative for headaches.           Current Outpatient Medications:      acetaminophen (TYLENOL) 325 MG tablet, Take 2 tablets (650 mg) by mouth every 4 hours as needed for mild pain (to moderate pain), Disp: , Rfl:      albuterol (PROAIR HFA/PROVENTIL HFA/VENTOLIN HFA) 108 (90 Base) MCG/ACT inhaler, Inhale 2 puffs into the lungs every 4 hours as needed for wheezing or shortness of breath / dyspnea, Disp: 18 g, Rfl: 1     azelastine (ASTELIN) 0.1 % nasal spray, Spray 2 sprays into both nostrils 2 times daily as needed for rhinitis, Disp: 30 mL, Rfl: 3     blood glucose (NO BRAND SPECIFIED) test strip, Use to test blood sugar 6 times daily or as directed., Disp: 300 strip, Rfl: 11     budesonide-formoterol (SYMBICORT) 80-4.5 MCG/ACT Inhaler, Inhale 2 puffs into the lungs 2 times daily, Disp: 10.2 g, Rfl: 3     cetirizine (ZYRTEC) 10 MG tablet, Take 1-2 tablets (10-20 mg) by mouth nightly as needed for allergies or rhinitis, Disp: 90 tablet, Rfl: 0     fluticasone (FLONASE) 50 MCG/ACT nasal spray, Spray 2 sprays into both nostrils daily, Disp: 15.8 mL, Rfl: 1     gabapentin (NEURONTIN) 300 MG capsule, Take 300 mg in  in afternoon and 600 mg at night. (Patient taking differently: Take 300 mg by mouth 3 times daily), Disp: 120 capsule, Rfl: 3     insulin pen needle (32G X 4 MM) 32G X 4 MM miscellaneous, Use 1 pen needles daily with Victoza, Disp: 50 each, Rfl: 11     levothyroxine  (SYNTHROID/LEVOTHROID) 50 MCG tablet, TAKE 1 TABLET BY MOUTH EVERY MORNING (BEFORE BREAKFAST), Disp: 30 tablet, Rfl: 0     lithium (ESKALITH) 150 MG capsule, Take 300 mg in AM and 450 mg at night, Disp: 150 capsule, Rfl: 0     mineral oil-hydrophilic petrolatum (AQUAPHOR) external ointment, Apply topically daily as needed for dry skin (apply to dry skin and rash around umbilical hernia), Disp: , Rfl:      OLANZapine (ZYPREXA) 5 MG tablet, Take 5 mg by mouth At Bedtime, Disp: , Rfl:      risperiDONE microspheres ER (RISPERDAL CONSTA) 50 MG injection, Next due 6/30, Disp: , Rfl:      ammonium lactate (LAC-HYDRIN) 12 % external cream, Apply topically 2 times daily as needed for dry skin (Patient not taking: Reported on 7/22/2022), Disp: 385 g, Rfl: 3     EPINEPHrine (ANY BX GENERIC EQUIV) 0.3 MG/0.3ML injection 2-pack, Inject 0.3 mg into the muscle as needed for anaphylaxis (Patient not taking: Reported on 7/22/2022), Disp: , Rfl:      hydrOXYzine (ATARAX) 25 MG tablet, Take 1 tablet (25 mg) by mouth every 6 hours as needed for other (adjuvant pain) (Patient not taking: Reported on 7/22/2022), Disp: 40 tablet, Rfl: 0     liraglutide (VICTOZA) 18 MG/3ML solution, Inject 1.8 mg Subcutaneous daily, Disp: 9 mL, Rfl: 3     terbinafine (LAMISIL AT) 1 % external cream, Apply topically 2 times daily as needed (rash/itching.  Apply to feet.) (Patient not taking: Reported on 7/22/2022), Disp: 30 g, Rfl: 1  Immunization History   Administered Date(s) Administered     COVID-19,PF,Pfizer (12+ Yrs) 05/18/2021, 06/08/2021, 01/19/2022     DTAP (<7y) 03/01/1985, 06/01/1985, 07/01/1985, 07/01/1986, 07/26/1990     HPV Quadrivalent 12/12/2008, 09/06/2011     Hep B, Peds or Adolescent 02/16/1998     Historical DTP/aP 03/01/1985, 06/01/1985, 07/01/1985, 07/01/1986, 07/26/1990     Historical Hepb 02/16/1998     Influenza (IIV3) PF 12/09/2008, 12/06/2012     Influenza Vaccine IM > 6 months Valent IIV4 (Neftali,Fluzone) 11/21/2017, 01/03/2019,  09/17/2019, 09/16/2020     MMR 02/27/1986, 07/17/1997     Poliovirus, inactivated (IPV) 06/01/1985, 07/01/1985, 01/01/1987, 07/26/1990     TDAP Vaccine (Adacel) 06/30/1997, 03/31/2008, 05/14/2010, 12/06/2012     Td (Adult), Adsorbed 06/30/1997     Allergies   Allergen Reactions     Dust Mites Shortness Of Breath     Animal Dander      Other reaction(s): *Unknown     Metformin Fatigue     Patient is taking extended release at home as of 3/3/22     Mold      Other reaction(s): Runny Nose     Trees      OBJECTIVE:                                                                 /82 (BP Location: Left arm, Patient Position: Sitting, Cuff Size: Adult Regular)   Pulse 99   Temp 98.9  F (37.2  C) (Tympanic)   Wt 111.2 kg (245 lb 2.4 oz)   LMP 06/17/2022 (Approximate)   SpO2 95%   BMI 43.43 kg/m          Physical Exam  Vitals and nursing note reviewed.   Constitutional:       General: She is sleeping.      Appearance: She is not diaphoretic.   HENT:      Head: Normocephalic and atraumatic.      Right Ear: Tympanic membrane, ear canal and external ear normal.      Left Ear: Tympanic membrane, ear canal and external ear normal.      Nose: Septal deviation (mild, to the right) present. No mucosal edema or rhinorrhea.      Mouth/Throat:      Mouth: Mucous membranes are moist.      Pharynx: Oropharynx is clear. No oropharyngeal exudate.   Eyes:      General:         Right eye: No discharge.         Left eye: No discharge.      Conjunctiva/sclera:      Right eye: Right conjunctiva is not injected.      Left eye: Left conjunctiva is not injected.   Cardiovascular:      Rate and Rhythm: Normal rate and regular rhythm.      Heart sounds: Normal heart sounds. No murmur heard.  Pulmonary:      Effort: Pulmonary effort is normal. No respiratory distress.      Breath sounds: Normal breath sounds and air entry. No decreased breath sounds, wheezing, rhonchi or rales.   Musculoskeletal:         General: Normal range of motion.       Cervical back: Normal range of motion.   Skin:     General: Skin is warm.   Neurological:      Mental Status: She is easily aroused.      Comments: Sleepy but easily arousable   Psychiatric:         Mood and Affect: Affect is flat.         Behavior: Behavior normal.      Comments: Pressured speech.  Poor eye contact         ASSESSMENT/PLAN:    Seasonal allergic rhinitis due to pollen  Allergic rhinitis due to animals  Allergic rhinitis caused by mold  Allergic rhinitis due to dust mite  Allergic conjunctivitis of both eyes    Status post allergen immunotherapy [id number], with a history of a systemic reaction that required epi.  Currently well controlled with cetirizine 10 mg by mouth once daily, intranasal fluticasone and azelastine.  - Suggest transitioning azelastine to on as-needed basis.  Wonder if that could contribute to her fatigue.    Considering frequent issues with mental health and history of a previous systemic reaction due to allergen immunotherapy, I do not think that restarting allergen immunotherapy is in her best interest.    Shortness of breath  She will put Symbicort close to her other medications.  She will try to use it twice daily.  We will monitor her dyspnea.  - Continue using albuterol inhaler as needed.     Other fatigue  Considering excessive sleepiness and progressive fatigue, along with numbness in her right hand, I called rapid response.  The patient was taken to the ED for further evaluation and management.    Return in about 3 months (around 10/22/2022), or if symptoms worsen or fail to improve.    Thank you for allowing us to participate in the care of this patient. Please feel free to contact us if there are any questions or concerns about the patient.    Disclaimer: This note consists of symbols derived from keyboarding, dictation and/or voice recognition software. As a result, there may be errors in the script that have gone undetected. Please consider this when interpreting  information found in this chart.    Rudy Shin MD, FAAAAI, FACAAI  Allergy, Asthma and Immunology     [facility]

## 2022-07-25 ENCOUNTER — NURSE TRIAGE (OUTPATIENT)
Dept: NURSING | Facility: CLINIC | Age: 37
End: 2022-07-25
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
## 2022-07-27 ENCOUNTER — TELEPHONE (OUTPATIENT)
Dept: SURGERY | Facility: CLINIC | Age: 37
End: 2022-07-27

## 2022-07-27 NOTE — TELEPHONE ENCOUNTER
Patient called wondering if she could move her surgery date up from 9/28. Scheduled for umbilical hernia repair with Dr. Montero. Offered 8/31 with 8:50 a.m. case start time, she would like to take that date. Discussed she will need a pre-op no more than 30 days prior to surgery and will do an at-home COVID-19 test on 8/29. Instructed to arrive 90 minutes prior to case start.
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
## 2022-08-03 ENCOUNTER — VIRTUAL VISIT (OUTPATIENT)
Dept: EDUCATION SERVICES | Facility: CLINIC | Age: 37
End: 2022-08-03
Payer: MEDICARE

## 2022-08-03 DIAGNOSIS — E11.9 TYPE 2 DIABETES MELLITUS WITHOUT COMPLICATION, WITHOUT LONG-TERM CURRENT USE OF INSULIN (H): Primary | ICD-10-CM

## 2022-08-03 PROCEDURE — 98967 PH1 ASSMT&MGMT NQHP 11-20: CPT | Mod: 95 | Performed by: DIETITIAN, REGISTERED
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
## 2022-08-04 ENCOUNTER — TRANSFERRED RECORDS (OUTPATIENT)
Dept: HEALTH INFORMATION MANAGEMENT | Facility: CLINIC | Age: 37
End: 2022-08-04

## 2022-08-04 LAB
RETINOPATHY: NEGATIVE
RETINOPATHY: NORMAL

## 2022-08-05 ENCOUNTER — MYC MEDICAL ADVICE (OUTPATIENT)
Dept: FAMILY MEDICINE | Facility: CLINIC | Age: 37
End: 2022-08-05

## 2022-08-07 ENCOUNTER — HEALTH MAINTENANCE LETTER (OUTPATIENT)
Age: 37
End: 2022-08-07

## 2022-08-08 ENCOUNTER — APPOINTMENT (OUTPATIENT)
Dept: GENERAL RADIOLOGY | Facility: CLINIC | Age: 37
End: 2022-08-08
Attending: EMERGENCY MEDICINE
Payer: MEDICARE

## 2022-08-08 ENCOUNTER — HOSPITAL ENCOUNTER (EMERGENCY)
Facility: CLINIC | Age: 37
Discharge: HOME | End: 2022-08-09
Attending: EMERGENCY MEDICINE | Admitting: EMERGENCY MEDICINE
Payer: MEDICARE

## 2022-08-08 VITALS
TEMPERATURE: 98.4 F | DIASTOLIC BLOOD PRESSURE: 82 MMHG | WEIGHT: 245 LBS | SYSTOLIC BLOOD PRESSURE: 118 MMHG | RESPIRATION RATE: 16 BRPM | HEART RATE: 101 BPM | OXYGEN SATURATION: 96 % | BODY MASS INDEX: 43.4 KG/M2

## 2022-08-08 DIAGNOSIS — N39.0 ACUTE LOWER UTI: ICD-10-CM

## 2022-08-08 DIAGNOSIS — E03.8 OTHER SPECIFIED HYPOTHYROIDISM: ICD-10-CM

## 2022-08-08 DIAGNOSIS — M54.50 ACUTE BILATERAL LOW BACK PAIN WITHOUT SCIATICA: ICD-10-CM

## 2022-08-08 PROBLEM — M99.09 SEGMENTAL AND SOMATIC DYSFUNCTION: Status: ACTIVE | Noted: 2022-05-10

## 2022-08-08 PROBLEM — M53.3 SI (SACROILIAC) JOINT DYSFUNCTION: Status: ACTIVE | Noted: 2022-05-10

## 2022-08-08 LAB
ALBUMIN UR-MCNC: NEGATIVE MG/DL
APPEARANCE UR: CLEAR
BACTERIA #/AREA URNS HPF: ABNORMAL /HPF
BILIRUB UR QL STRIP: ABNORMAL
COLOR UR AUTO: YELLOW
GLUCOSE UR STRIP-MCNC: NEGATIVE MG/DL
HCG UR QL: NEGATIVE
HGB UR QL STRIP: NEGATIVE
KETONES UR STRIP-MCNC: NEGATIVE MG/DL
LEUKOCYTE ESTERASE UR QL STRIP: ABNORMAL
MUCOUS THREADS #/AREA URNS LPF: PRESENT /LPF
NITRATE UR QL: NEGATIVE
PH UR STRIP: 6 [PH] (ref 5–7)
RBC URINE: 4 /HPF
SP GR UR STRIP: 1.03 (ref 1–1.03)
SQUAMOUS EPITHELIAL: 10 /HPF
UROBILINOGEN UR STRIP-MCNC: NORMAL MG/DL
WBC URINE: 92 /HPF

## 2022-08-08 PROCEDURE — 99284 EMERGENCY DEPT VISIT MOD MDM: CPT | Performed by: EMERGENCY MEDICINE

## 2022-08-08 PROCEDURE — 81025 URINE PREGNANCY TEST: CPT | Performed by: EMERGENCY MEDICINE

## 2022-08-08 PROCEDURE — 87086 URINE CULTURE/COLONY COUNT: CPT | Performed by: EMERGENCY MEDICINE

## 2022-08-08 PROCEDURE — 81001 URINALYSIS AUTO W/SCOPE: CPT | Performed by: EMERGENCY MEDICINE

## 2022-08-08 PROCEDURE — 72100 X-RAY EXAM L-S SPINE 2/3 VWS: CPT

## 2022-08-08 RX ORDER — CEPHALEXIN 500 MG/1
500 CAPSULE ORAL 2 TIMES DAILY
Qty: 8 CAPSULE | Refills: 0 | Status: SHIPPED | OUTPATIENT
Start: 2022-08-08 | End: 2022-08-12

## 2022-08-08 RX ORDER — CEPHALEXIN 500 MG/1
500 CAPSULE ORAL ONCE
Status: COMPLETED | OUTPATIENT
Start: 2022-08-09 | End: 2022-08-09

## 2022-08-09 PROCEDURE — 250N000013 HC RX MED GY IP 250 OP 250 PS 637: Performed by: EMERGENCY MEDICINE

## 2022-08-09 RX ADMIN — CEPHALEXIN 500 MG: 500 CAPSULE ORAL at 00:04

## 2022-08-09 NOTE — ED PROVIDER NOTES
History     Chief Complaint   Patient presents with     Back Pain     HPI  Maddy Ortiz is a 36 year old female with a past medical history significant for bipolar disorder depression with anxiety schizoaffective disorder diabetes type 2 who presents emergency department complaining of lower back pain.  Patient states she has been seeing a chiropractor and has some lower back pain.  Pain shot down her legs few times since she had tingling to her toes for little bit but this resolved she called the nurse line and they told her to come in and be seen.  She denies any significant pain at this time I am unsure if she injured her back she does not think she is fallen pain is actually improved with the ibuprofen she took but still present it does not radiate at this time she is able move her legs without difficulty denies any bowel or bladder dysfunction she has no focal numbness weakness at this time.    Allergies:  Allergies   Allergen Reactions     Dust Mites Shortness Of Breath     Animal Dander      Other reaction(s): *Unknown     Metformin Fatigue     Patient is taking extended release at home as of 3/3/22     Mold      Other reaction(s): Runny Nose     Trees        Problem List:    Patient Active Problem List    Diagnosis Date Noted     Overdose, undetermined intent, initial encounter 06/18/2022     Priority: Medium     Segmental and somatic dysfunction 05/10/2022     Priority: Medium     SI (sacroiliac) joint dysfunction 05/10/2022     Priority: Medium     Polypharmacy 04/25/2022     Priority: Medium     Sedated due to multiple medications 04/25/2022     Priority: Medium     Elevated LFTs 01/08/2022     Priority: Medium     Disorganized behavior 01/08/2022     Priority: Medium     Infection due to 2019 novel coronavirus 01/08/2022     Priority: Medium     Diabetes mellitus, type 2 (H) 12/13/2021     Priority: Medium     Fatty liver 11/05/2021     Priority: Medium     Umbilical hernia without obstruction and  without gangrene 10/26/2021     Priority: Medium     Schizoaffective disorder (H) 07/13/2021     Priority: Medium     Morbid obesity (H) 01/02/2019     Priority: Medium     Paranoia (psychosis) (H) 08/24/2018     Priority: Medium     Gastroesophageal reflux disease without esophagitis 06/08/2018     Priority: Medium     Schizoaffective disorder, bipolar type (H) 05/07/2018     Priority: Medium     Encounter for IUD insertion 09/15/2017     Priority: Medium     4/24/22 bryanna insertion lot# [id number] exp-4/2023       Seasonal allergic rhinitis due to pollen 11/04/2016     Priority: Medium     Allergic rhinitis due to mold 11/04/2016     Priority: Medium     Allergic rhinitis due to animal dander 11/04/2016     Priority: Medium     Allergic rhinitis due to dust mite 11/04/2016     Priority: Medium     Depression with anxiety 01/26/2015     Priority: Medium     Insomnia 07/18/2013     Priority: Medium     Bipolar 1 disorder (H) 12/06/2012     Priority: Medium     Planning on seeing Purvi (psychiatric nurse)       Paranoid type delusional disorder (H) 11/14/2012     Priority: Medium     Psychosis (H) 10/16/2012     Priority: Medium     Animal dander allergy 05/09/2012     Priority: Medium     Dog and Cat       CARDIOVASCULAR SCREENING; LDL GOAL LESS THAN 160 05/09/2012     Priority: Medium        Past Medical History:    Past Medical History:   Diagnosis Date     Bipolar 1 disorder (H) 12/6/2012     Depressive disorder      Diabetes mellitus, type 2 (H) 12/13/2021     Paranoid type delusional disorder (H) 11/14/2012       Past Surgical History:    Past Surgical History:   Procedure Laterality Date     TONSILLECTOMY & ADENOIDECTOMY      as a child     TONSILLECTOMY & ADENOIDECTOMY         Family History:    Family History   Adopted: Yes   Problem Relation Age of Onset     Unknown/Adopted Mother      Unknown/Adopted Father      Unknown/Adopted Other      Hypertension Sister        Social History:  Marital Status:  Single  [1]  Social History     Tobacco Use     Smoking status: Former Smoker     Packs/day: 0.50     Types: Cigarettes     Smokeless tobacco: Former User     Tobacco comment: around 2nd hand smoke   Vaping Use     Vaping Use: Never used   Substance Use Topics     Alcohol use: Not Currently     Drug use: Not Currently        Medications:    acetaminophen (TYLENOL) 325 MG tablet  albuterol (PROAIR HFA/PROVENTIL HFA/VENTOLIN HFA) 108 (90 Base) MCG/ACT inhaler  ammonium lactate (LAC-HYDRIN) 12 % external cream  azelastine (ASTELIN) 0.1 % nasal spray  blood glucose (NO BRAND SPECIFIED) test strip  budesonide-formoterol (SYMBICORT) 80-4.5 MCG/ACT Inhaler  cetirizine (ZYRTEC) 10 MG tablet  EPINEPHrine (ANY BX GENERIC EQUIV) 0.3 MG/0.3ML injection 2-pack  fluticasone (FLONASE) 50 MCG/ACT nasal spray  gabapentin (NEURONTIN) 300 MG capsule  hydrOXYzine (ATARAX) 25 MG tablet  insulin pen needle (32G X 4 MM) 32G X 4 MM miscellaneous  levothyroxine (SYNTHROID/LEVOTHROID) 50 MCG tablet  liraglutide (VICTOZA) 18 MG/3ML solution  lithium (ESKALITH) 150 MG capsule  mineral oil-hydrophilic petrolatum (AQUAPHOR) external ointment  OLANZapine (ZYPREXA) 5 MG tablet  risperiDONE microspheres ER (RISPERDAL CONSTA) 50 MG injection  terbinafine (LAMISIL AT) 1 % external cream          Review of Systems  All systems reviewed and other than pertinent positives and negatives in HPI all other systems are negative.  Physical Exam   BP: 118/82  Pulse: 101  Temp: 98.4  F (36.9  C)  Resp: 16  Weight: 111.1 kg (245 lb)  SpO2: 96 %      Physical Exam  Vitals and nursing note reviewed.   Constitutional:       General: She is not in acute distress.     Appearance: Normal appearance. She is not ill-appearing, toxic-appearing or diaphoretic.   HENT:      Head: Normocephalic and atraumatic.      Nose: Nose normal.      Mouth/Throat:      Mouth: Mucous membranes are moist.      Pharynx: Oropharynx is clear.   Cardiovascular:      Rate and Rhythm: Normal rate and  regular rhythm.      Pulses: Normal pulses.      Heart sounds: Normal heart sounds. No murmur heard.  Pulmonary:      Effort: Pulmonary effort is normal.      Breath sounds: Normal breath sounds. No stridor. No wheezing or rhonchi.   Abdominal:      General: Abdomen is flat. Bowel sounds are normal. There is no distension.      Palpations: Abdomen is soft.      Tenderness: There is no abdominal tenderness. There is no right CVA tenderness or left CVA tenderness.   Musculoskeletal:      Cervical back: Normal range of motion and neck supple.      Comments: Mild tenderness palpation lower lumbar spine.  No erythema edema mild paraspinous muscles of tenderness no SI joint tenderness patient able to raise legs without difficulty and no significant pain in the back she has no numbness or weakness in her lower extremities good plantar flexion dorsiflexion ankles bilaterally pulses sensation symmetrical good capillary refill.   Skin:     General: Skin is warm and dry.      Capillary Refill: Capillary refill takes less than 2 seconds.      Findings: No rash.   Neurological:      General: No focal deficit present.      Mental Status: She is alert and oriented to person, place, and time.      Sensory: No sensory deficit.      Motor: No weakness.      Coordination: Coordination normal.   Psychiatric:         Mood and Affect: Mood normal.         ED Course                 Procedures              Critical Care time:  none               Results for orders placed or performed during the hospital encounter of 08/08/22 (from the past 24 hour(s))   UA with Microscopic reflex to Culture    Specimen: Urine, Clean Catch   Result Value Ref Range    Color Urine Yellow Colorless, Straw, Light Yellow, Yellow    Appearance Urine Clear Clear    Glucose Urine Negative Negative mg/dL    Bilirubin Urine Small (A) Negative    Ketones Urine Negative Negative mg/dL    Specific Gravity Urine 1.030 1.003 - 1.035    Blood Urine Negative Negative    pH  Urine 6.0 5.0 - 7.0    Protein Albumin Urine Negative Negative mg/dL    Urobilinogen Urine Normal Normal, 2.0 mg/dL    Nitrite Urine Negative Negative    Leukocyte Esterase Urine Trace (A) Negative    Bacteria Urine Few (A) None Seen /HPF    Mucus Urine Present (A) None Seen /LPF    RBC Urine 4 (H) <=2 /HPF    WBC Urine 92 (H) <=5 /HPF    Squamous Epithelials Urine 10 (H) <=1 /HPF    Narrative    Urine Culture ordered based on laboratory criteria   HCG qualitative urine   Result Value Ref Range    hCG Urine Qualitative Negative Negative   Lumbar spine XR, 2-3 views    Narrative    EXAM: XR LUMBAR SPINE 2/3 VIEWS  LOCATION: [facility]  DATE/TIME: 8/8/2022 11:34 PM    INDICATION: Lower back pain  COMPARISON: CT abdomen and pelvis 7/3/2022  TECHNIQUE: CR Lumbar Spine.      Impression    IMPRESSION: 5 lumbar type vertebra. Straightening of the usual lumbar curvature. Otherwise unremarkable. No fracture.       Medications   cephALEXin (KEFLEX) capsule 500 mg (500 mg Oral Given 8/9/22 0004)       Assessments & Plan (with Medical Decision Making) records were reviewed urine analysis and pregnancy test were taken and an x-ray of the lumbar spine was obtained.  Pregnancy test was negative.  Urine analysis was consistent with a possible UTI.  X-ray revealed no evidence of fracture dislocation or other significant abnormality.  Patient has no bowel or bladder dysfunction or focal numbness weakness in extremity.  Pain is only in her lumbar spine at this time I do not think further imaging study is warranted.  I am going to treat patient for a UTI and have her follow-up with her primary care.  She understands she should take ibuprofen or Tylenol for pain and try heating pad.  If any numbness weakness bowel or bladder dysfunction she will return for further evaluation and care.  Patient feels comfortable with this plan.     I have reviewed the nursing notes.    I have reviewed the findings, diagnosis,  plan and need for follow up with the patient.       New Prescriptions    No medications on file       Final diagnoses:   None       8/8/2022   [facility] EMERGENCY DEPT     Jay Jay Huerta MD  08/09/22 0908

## 2022-08-09 NOTE — ED TRIAGE NOTES
Pt states she called [facility] and goes to Chiropractic and PT care for her back.  Pt with pain in back arms and back of legs ankles and feet.   Pt has DM.  Numbness on R arm and R leg.   Pt took tylenol for pain.

## 2022-08-09 NOTE — DISCHARGE INSTRUCTIONS
Return if symptoms worsen or new symptoms develop.  Follow-up with primary care physician later this week for recheck take antibiotics as directed.  Drink plenty of fluids.  Ibuprofen or Tylenol for back pain if any numbness weakness bowel or bladder dysfunction or other symptoms present please return for recheck.

## 2022-08-10 ENCOUNTER — PATIENT OUTREACH (OUTPATIENT)
Dept: ENDOCRINOLOGY | Facility: CLINIC | Age: 37
End: 2022-08-10

## 2022-08-10 LAB — BACTERIA UR CULT: NORMAL

## 2022-08-10 RX ORDER — LEVOTHYROXINE SODIUM 50 UG/1
TABLET ORAL
Qty: 30 TABLET | Refills: 0 | Status: SHIPPED | OUTPATIENT
Start: 2022-08-10 | End: 2022-08-19

## 2022-08-10 NOTE — TELEPHONE ENCOUNTER
"Has appointment scheduled for 8/25/22      Requested Prescriptions   Pending Prescriptions Disp Refills     levothyroxine (SYNTHROID/LEVOTHROID) 50 MCG tablet [Pharmacy Med Name: LEVOTHYROXINE 50 MCG TABLET] 30 tablet 0     Sig: TAKE 1 TABLET BY MOUTH EVERY MORNING BEFORE BREAKFAST       Thyroid Protocol Failed - 8/8/2022  5:25 PM        Failed - Normal TSH on file in past 12 months     Recent Labs   Lab Test 06/17/22  2354   TSH 9.74*              Passed - Patient is 12 years or older        Passed - Recent (12 mo) or future (30 days) visit within the authorizing provider's specialty     Patient has had an office visit with the authorizing provider or a provider within the authorizing providers department within the previous 12 mos or has a future within next 30 days. See \"Patient Info\" tab in inbasket, or \"Choose Columns\" in Meds & Orders section of the refill encounter.              Passed - Medication is active on med list        Passed - No active pregnancy on record     If patient is pregnant or has had a positive pregnancy test, please check TSH.          Passed - No positive pregnancy test in past 12 months     If patient is pregnant or has had a positive pregnancy test, please check TSH.               "

## 2022-08-10 NOTE — RESULT ENCOUNTER NOTE
Final urine culture report is negative.  Adult Negative Urine culture parameters per protocol: Any # Urogenital single or mixed organism, <10,000 col/ml single organism (cath/midstream), and > 3 organisms (No susceptibilities performed).  [facility] Emergency Dept discharge antibiotic prescribed (If applicable): Cephalexin  Treatment recommendations per [facility] ED Lab Result Urine Culture protocol.

## 2022-08-11 ENCOUNTER — OFFICE VISIT (OUTPATIENT)
Dept: NEUROLOGY | Facility: CLINIC | Age: 37
End: 2022-08-11
Payer: MEDICARE

## 2022-08-11 VITALS
DIASTOLIC BLOOD PRESSURE: 90 MMHG | SYSTOLIC BLOOD PRESSURE: 134 MMHG | HEART RATE: 110 BPM | BODY MASS INDEX: 41.63 KG/M2 | WEIGHT: 235 LBS

## 2022-08-11 DIAGNOSIS — R20.0 NUMBNESS AND TINGLING OF BOTH LEGS: Primary | ICD-10-CM

## 2022-08-11 DIAGNOSIS — M79.672 PAIN IN BOTH FEET: ICD-10-CM

## 2022-08-11 DIAGNOSIS — R20.2 NUMBNESS AND TINGLING OF BOTH LEGS: Primary | ICD-10-CM

## 2022-08-11 DIAGNOSIS — R20.2 PARESTHESIA: ICD-10-CM

## 2022-08-11 DIAGNOSIS — M79.671 PAIN IN BOTH FEET: ICD-10-CM

## 2022-08-11 PROCEDURE — 99213 OFFICE O/P EST LOW 20 MIN: CPT | Performed by: STUDENT IN AN ORGANIZED HEALTH CARE EDUCATION/TRAINING PROGRAM

## 2022-08-11 RX ORDER — LAMOTRIGINE 25 MG/1
TABLET ORAL
COMMUNITY
Start: 2022-07-28 | End: 2022-08-30

## 2022-08-11 NOTE — LETTER
8/11/2022         RE: Maddy Ortiz  [address]  [place] [address]        Dear Colleague,    Thank you for referring your patient, Maddy Ortiz, to the [facility] [place]. Please see a copy of my visit note below.    [facility]  Section of General Neurology  Return Patient Visit    Maddy Ortiz MRN# [id number]   Age: 36 year old YOB: 1985            Assessment and Plan:   Maddy Ortiz is a 36 year old female who presents today in follow up  leg/foot numbness and tingling  She has a PMH of bipolar disorder, depression, T2DM.  Her bilateral foot and leg symptoms started around the same timeline as her diabetes diagnosis.  the gabapentin has been helpful.  She notes newer low back pain that she presented to an ER for and has improved with chiropractic care.  During our examination the patient stated she needed to leave and did in fact abruptly leave the appointment.  I would continue gabapentin at current doses.  I hope she continues to get the psychiatric care she needs.    She can follow up with me should further symptoms arise or new questions emerge.           Cristobal Olivia MD   of Neurology   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      She notes she thinks her medication is working as she had a minor panic attack on the way here but was able to power through it.    She feels psychosomatic pain is worsening.    She feels chiropractic care is helping her low back pain, improved.    No known side effects from the gabapentin.  Sleep is good.    Numbness and tingling is improving but she feels more acutely on right side than left.    She is focusing on her and her daughter (14) right now and not cleaning right now.  Her daughter has ADHD.             Past Medical History:     Patient Active Problem List   Diagnosis     Animal dander allergy     CARDIOVASCULAR SCREENING; LDL GOAL LESS THAN 160     Psychosis (H)     Paranoid type delusional disorder (H)     Bipolar 1 disorder (H)     Insomnia     Depression with anxiety     Seasonal allergic rhinitis due to pollen     Allergic rhinitis due to mold     Allergic rhinitis due to animal dander     Allergic rhinitis due to dust mite     Encounter for IUD insertion     Schizoaffective disorder, bipolar type (H)     Gastroesophageal reflux disease without esophagitis     Paranoia (psychosis) (H)     Morbid obesity (H)     Schizoaffective disorder (H)     Umbilical hernia without obstruction and without gangrene     Fatty liver     Diabetes mellitus, type 2 (H)     Elevated LFTs     Disorganized behavior     Infection due to 2019 novel coronavirus     Polypharmacy     Sedated due to multiple medications     Overdose, undetermined intent, initial encounter     Segmental and somatic dysfunction     SI (sacroiliac) joint dysfunction     Past Medical History:   Diagnosis Date     Bipolar 1 disorder (H) 12/6/2012     Depressive disorder      Diabetes mellitus, type 2 (H) 12/13/2021     Paranoid type delusional disorder (H) 11/14/2012        Past Surgical History:     Past Surgical History:   Procedure Laterality Date     TONSILLECTOMY & ADENOIDECTOMY      as a child     TONSILLECTOMY & ADENOIDECTOMY          Social History:     Social History     Tobacco Use     Smoking status: Former Smoker     Packs/day: 0.50     Types: Cigarettes     Smokeless tobacco: Former User     Tobacco comment: around 2nd hand smoke   Vaping Use     Vaping Use: Never used   Substance Use Topics     Alcohol use: Not Currently     Drug use: Not Currently        Family History:     Family History   Adopted: Yes   Problem Relation Age of Onset     Unknown/Adopted Mother      Unknown/Adopted Father      Unknown/Adopted Other      Hypertension  Sister         Medications:     Current Outpatient Medications   Medication Sig     acetaminophen (TYLENOL) 325 MG tablet Take 2 tablets (650 mg) by mouth every 4 hours as needed for mild pain (to moderate pain)     albuterol (PROAIR HFA/PROVENTIL HFA/VENTOLIN HFA) 108 (90 Base) MCG/ACT inhaler Inhale 2 puffs into the lungs every 4 hours as needed for wheezing or shortness of breath / dyspnea     ammonium lactate (LAC-HYDRIN) 12 % external cream Apply topically 2 times daily as needed for dry skin (Patient not taking: Reported on 7/22/2022)     azelastine (ASTELIN) 0.1 % nasal spray Spray 2 sprays into both nostrils 2 times daily as needed for rhinitis     blood glucose (NO BRAND SPECIFIED) test strip Use to test blood sugar 6 times daily or as directed.     budesonide-formoterol (SYMBICORT) 80-4.5 MCG/ACT Inhaler Inhale 2 puffs into the lungs 2 times daily     cephALEXin (KEFLEX) 500 MG capsule Take 1 capsule (500 mg) by mouth 2 times daily for 4 days     cetirizine (ZYRTEC) 10 MG tablet Take 1-2 tablets (10-20 mg) by mouth nightly as needed for allergies or rhinitis     EPINEPHrine (ANY BX GENERIC EQUIV) 0.3 MG/0.3ML injection 2-pack Inject 0.3 mg into the muscle as needed for anaphylaxis (Patient not taking: Reported on 7/22/2022)     fluticasone (FLONASE) 50 MCG/ACT nasal spray Spray 2 sprays into both nostrils daily     gabapentin (NEURONTIN) 300 MG capsule Take 300 mg in  in afternoon and 600 mg at night. (Patient taking differently: Take 300 mg by mouth 3 times daily)     hydrOXYzine (ATARAX) 25 MG tablet Take 1 tablet (25 mg) by mouth every 6 hours as needed for other (adjuvant pain) (Patient not taking: Reported on 7/22/2022)     insulin pen needle (32G X 4 MM) 32G X 4 MM miscellaneous Use 1 pen needles daily with Victoza     levothyroxine (SYNTHROID/LEVOTHROID) 50 MCG tablet TAKE 1 TABLET BY MOUTH EVERY MORNING BEFORE BREAKFAST     liraglutide (VICTOZA) 18 MG/3ML solution Inject 1.8 mg Subcutaneous  daily     lithium (ESKALITH) 150 MG capsule Take 300 mg in AM and 450 mg at night     mineral oil-hydrophilic petrolatum (AQUAPHOR) external ointment Apply topically daily as needed for dry skin (apply to dry skin and rash around umbilical hernia)     OLANZapine (ZYPREXA) 5 MG tablet Take 5 mg by mouth At Bedtime     risperiDONE microspheres ER (RISPERDAL CONSTA) 50 MG injection Next due 6/30     terbinafine (LAMISIL AT) 1 % external cream Apply topically 2 times daily as needed (rash/itching.  Apply to feet.) (Patient not taking: Reported on 7/22/2022)     No current facility-administered medications for this visit.        Allergies:     Allergies   Allergen Reactions     Dust Mites Shortness Of Breath     Animal Dander      Other reaction(s): *Unknown     Metformin Fatigue     Patient is taking extended release at home as of 3/3/22     Mold      Other reaction(s): Runny Nose     Trees           Physical Exam:   Vitals: BP (!) 134/90 (BP Location: Right arm, Patient Position: Sitting, Cuff Size: Adult Large)   Pulse 110   Wt 106.6 kg (235 lb)   LMP 07/28/2022 (Approximate)   BMI 41.63 kg/m       Neuro:   General Appearance: Anxious    Mental Status: Alert and oriented to person, place, and time. Speech fluent and comprehension intact. No dysarthria  Cranial Nerves:   II: Visual fields: normal  III: Pupils: 3 mm, equal, round, reactive to light   III,IV,VI: Extraocular Movements: intact   V: Facial sensation: intact to light touch  VII: Facial strength: intact without asymmetry       Motor Exam:   Upper Extremities  Deltoid  Bicep  Tricep  Wrist Extensors  strength Intrinsic Muscles    Right  5  5  5  5 5 5    Left  5  5  5  5 5 5      Lower Extremities  Hip Flexors  Knee Extensors  Knee   Flexors  Dorsi Flexion  Plantar   Flexion    Right  5  5  5  5  5    Left  5  5  5  5  5        No drift is present. No abnormal movements. Tone is normal throughout.    Sensory: intact to light touch, vibration, when  starting pinprick patient panicked to a degree and abruptly left the appointment             Data: Pertinent prior to visit   Imaging:  EXAM: XR LUMBAR SPINE 2/3 VIEWS  LOCATION: [facility]  DATE/TIME: 8/8/2022 11:34 PM     INDICATION: Lower back pain  COMPARISON: CT abdomen and pelvis 7/3/2022  TECHNIQUE: CR Lumbar Spine.         Impression     IMPRESSION: 5 lumbar type vertebra. Straightening of the usual lumbar curvature. Otherwise unremarkable. No fracture.           Laboratory:       Vitamin b12 687 in 2021  Lab Results   Component Value Date    A1C 6.4 06/30/2022    A1C 6.0 06/19/2022    A1C 7.3 04/08/2022    A1C 8.4 01/08/2022    A1C 6.9 11/08/2021    A1C 4.7 06/09/2013           Again, thank you for allowing me to participate in the care of your patient.        Sincerely,        Krishna Olivia MD

## 2022-08-11 NOTE — PROGRESS NOTES
[facility]  Section of General Neurology  Return Patient Visit    Maddy Ortiz MRN# [id number]   Age: 36 year old YOB: 1985            Assessment and Plan:   Maddy Ortiz is a 36 year old female who presents today in follow up  leg/foot numbness and tingling  She has a PMH of bipolar disorder, depression, T2DM.  Her bilateral foot and leg symptoms started around the same timeline as her diabetes diagnosis.  the gabapentin has been helpful.  She notes newer low back pain that she presented to an ER for and has improved with chiropractic care.  During our examination the patient stated she needed to leave and did in fact abruptly leave the appointment.  I would continue gabapentin at current doses.  I hope she continues to get the psychiatric care she needs.    She can follow up with me should further symptoms arise or new questions emerge.           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      She notes she thinks her medication is working as she had a minor panic attack on the way here but was able to power through it.    She feels psychosomatic pain is worsening.    She feels chiropractic care is helping her low back pain, improved.    No known side effects from the gabapentin.  Sleep is good.    Numbness and tingling is improving but she feels more acutely on right side than left.    She is focusing on her and her daughter (14) right now and not cleaning right now.  Her daughter has ADHD.            Past Medical History:     Patient Active Problem List   Diagnosis     Animal dander allergy     CARDIOVASCULAR SCREENING; LDL GOAL LESS THAN 160     Psychosis (H)     Paranoid type delusional disorder (H)     Bipolar 1 disorder (H)     Insomnia     Depression with  anxiety     Seasonal allergic rhinitis due to pollen     Allergic rhinitis due to mold     Allergic rhinitis due to animal dander     Allergic rhinitis due to dust mite     Encounter for IUD insertion     Schizoaffective disorder, bipolar type (H)     Gastroesophageal reflux disease without esophagitis     Paranoia (psychosis) (H)     Morbid obesity (H)     Schizoaffective disorder (H)     Umbilical hernia without obstruction and without gangrene     Fatty liver     Diabetes mellitus, type 2 (H)     Elevated LFTs     Disorganized behavior     Infection due to 2019 novel coronavirus     Polypharmacy     Sedated due to multiple medications     Overdose, undetermined intent, initial encounter     Segmental and somatic dysfunction     SI (sacroiliac) joint dysfunction     Past Medical History:   Diagnosis Date     Bipolar 1 disorder (H) 12/6/2012     Depressive disorder      Diabetes mellitus, type 2 (H) 12/13/2021     Paranoid type delusional disorder (H) 11/14/2012        Past Surgical History:     Past Surgical History:   Procedure Laterality Date     TONSILLECTOMY & ADENOIDECTOMY      as a child     TONSILLECTOMY & ADENOIDECTOMY          Social History:     Social History     Tobacco Use     Smoking status: Former Smoker     Packs/day: 0.50     Types: Cigarettes     Smokeless tobacco: Former User     Tobacco comment: around 2nd hand smoke   Vaping Use     Vaping Use: Never used   Substance Use Topics     Alcohol use: Not Currently     Drug use: Not Currently        Family History:     Family History   Adopted: Yes   Problem Relation Age of Onset     Unknown/Adopted Mother      Unknown/Adopted Father      Unknown/Adopted Other      Hypertension Sister         Medications:     Current Outpatient Medications   Medication Sig     acetaminophen (TYLENOL) 325 MG tablet Take 2 tablets (650 mg) by mouth every 4 hours as needed for mild pain (to moderate pain)     albuterol (PROAIR HFA/PROVENTIL HFA/VENTOLIN HFA) 108 (90  Base) MCG/ACT inhaler Inhale 2 puffs into the lungs every 4 hours as needed for wheezing or shortness of breath / dyspnea     ammonium lactate (LAC-HYDRIN) 12 % external cream Apply topically 2 times daily as needed for dry skin (Patient not taking: Reported on 7/22/2022)     azelastine (ASTELIN) 0.1 % nasal spray Spray 2 sprays into both nostrils 2 times daily as needed for rhinitis     blood glucose (NO BRAND SPECIFIED) test strip Use to test blood sugar 6 times daily or as directed.     budesonide-formoterol (SYMBICORT) 80-4.5 MCG/ACT Inhaler Inhale 2 puffs into the lungs 2 times daily     cephALEXin (KEFLEX) 500 MG capsule Take 1 capsule (500 mg) by mouth 2 times daily for 4 days     cetirizine (ZYRTEC) 10 MG tablet Take 1-2 tablets (10-20 mg) by mouth nightly as needed for allergies or rhinitis     EPINEPHrine (ANY BX GENERIC EQUIV) 0.3 MG/0.3ML injection 2-pack Inject 0.3 mg into the muscle as needed for anaphylaxis (Patient not taking: Reported on 7/22/2022)     fluticasone (FLONASE) 50 MCG/ACT nasal spray Spray 2 sprays into both nostrils daily     gabapentin (NEURONTIN) 300 MG capsule Take 300 mg in  in afternoon and 600 mg at night. (Patient taking differently: Take 300 mg by mouth 3 times daily)     hydrOXYzine (ATARAX) 25 MG tablet Take 1 tablet (25 mg) by mouth every 6 hours as needed for other (adjuvant pain) (Patient not taking: Reported on 7/22/2022)     insulin pen needle (32G X 4 MM) 32G X 4 MM miscellaneous Use 1 pen needles daily with Victoza     levothyroxine (SYNTHROID/LEVOTHROID) 50 MCG tablet TAKE 1 TABLET BY MOUTH EVERY MORNING BEFORE BREAKFAST     liraglutide (VICTOZA) 18 MG/3ML solution Inject 1.8 mg Subcutaneous daily     lithium (ESKALITH) 150 MG capsule Take 300 mg in AM and 450 mg at night     mineral oil-hydrophilic petrolatum (AQUAPHOR) external ointment Apply topically daily as needed for dry skin (apply to dry skin and rash around umbilical hernia)     OLANZapine (ZYPREXA) 5  MG tablet Take 5 mg by mouth At Bedtime     risperiDONE microspheres ER (RISPERDAL CONSTA) 50 MG injection Next due 6/30     terbinafine (LAMISIL AT) 1 % external cream Apply topically 2 times daily as needed (rash/itching.  Apply to feet.) (Patient not taking: Reported on 7/22/2022)     No current facility-administered medications for this visit.        Allergies:     Allergies   Allergen Reactions     Dust Mites Shortness Of Breath     Animal Dander      Other reaction(s): *Unknown     Metformin Fatigue     Patient is taking extended release at home as of 3/3/22     Mold      Other reaction(s): Runny Nose     Trees           Physical Exam:   Vitals: BP (!) 134/90 (BP Location: Right arm, Patient Position: Sitting, Cuff Size: Adult Large)   Pulse 110   Wt 106.6 kg (235 lb)   LMP 07/28/2022 (Approximate)   BMI 41.63 kg/m       Neuro:   General Appearance: Anxious    Mental Status: Alert and oriented to person, place, and time. Speech fluent and comprehension intact. No dysarthria  Cranial Nerves:   II: Visual fields: normal  III: Pupils: 3 mm, equal, round, reactive to light   III,IV,VI: Extraocular Movements: intact   V: Facial sensation: intact to light touch  VII: Facial strength: intact without asymmetry       Motor Exam:   Upper Extremities  Deltoid  Bicep  Tricep  Wrist Extensors  strength Intrinsic Muscles    Right  5  5  5  5 5 5    Left  5  5  5  5 5 5      Lower Extremities  Hip Flexors  Knee Extensors  Knee   Flexors  Dorsi Flexion  Plantar   Flexion    Right  5  5  5  5  5    Left  5  5  5  5  5        No drift is present. No abnormal movements. Tone is normal throughout.    Sensory: intact to light touch, vibration, when starting pinprick patient panicked to a degree and abruptly left the appointment             Data: Pertinent prior to visit   Imaging:  EXAM: XR LUMBAR SPINE 2/3 VIEWS  LOCATION: [facility]  DATE/TIME: 8/8/2022 11:34 PM     INDICATION: Lower back  pain  COMPARISON: CT abdomen and pelvis 7/3/2022  TECHNIQUE: CR Lumbar Spine.         Impression     IMPRESSION: 5 lumbar type vertebra. Straightening of the usual lumbar curvature. Otherwise unremarkable. No fracture.           Laboratory:       Vitamin b12 687 in 2021  Lab Results   Component Value Date    A1C 6.4 06/30/2022    A1C 6.0 06/19/2022    A1C 7.3 04/08/2022    A1C 8.4 01/08/2022    A1C 6.9 11/08/2021    A1C 4.7 06/09/2013

## 2022-08-12 ENCOUNTER — NURSE TRIAGE (OUTPATIENT)
Dept: NURSING | Facility: CLINIC | Age: 37
End: 2022-08-12

## 2022-08-13 NOTE — TELEPHONE ENCOUNTER
"Pt reports \"feeling a lot of emotional pain, anxiety attack at 6:45, broke down [place] [employer] [place], daughter comforted me\". z'Every night for past month feeling a lot of pain for past monthz'. Pt reports her pain is \"psychosomatic\".     Writer transferred pt to mental health detox line for assistance.     Reason for Disposition    Patient sounds very upset or troubled to the triager    Additional Information    Negative: SEVERE difficulty breathing (e.g., struggling for each breath, speaks in single words)    Negative: Bluish (or gray) lips or face now    Negative: Difficult to awaken or acting confused (e.g., disoriented, slurred speech)    Negative: Hysterical or combative behavior    Negative: Sounds like a life-threatening emergency to the triager    Protocols used: ANXIETY AND PANIC ATTACK-A-AH      "

## 2022-08-13 NOTE — TELEPHONE ENCOUNTER
Pt reports she thinks she has psychosis and she is not sure what reality is. Pt has history of mental illness. Pt reports she is in a lot of pain.     Advised pt to hang up and dial 911.    Pt verbalizes understanding and agrees to plan. Declines to have Writer assist her in calling 911 and states she is able to do that.     Reason for Disposition    Depression is main problem or symptom (e.g., feelings of sadness or hopelessness)    [1] Difficult to awaken or acting very confused (disoriented, slurred speech) AND [2] new-onset    Protocols used: ANXIETY AND PANIC ATTACK-A-AH, DEPRESSION-A-AH

## 2022-08-14 ENCOUNTER — NURSE TRIAGE (OUTPATIENT)
Dept: NURSING | Facility: CLINIC | Age: 37
End: 2022-08-14

## 2022-08-14 NOTE — TELEPHONE ENCOUNTER
"S: \"Feels bad\"  B:  Thinks she needs to go into the hospital.  Not sure who would care for her 14 Y.O. daughter.    Talking about:     \"Psychosomatic\"     Worried about supporting her daughter.     Crying more often     Recalling bad and good memories    Frustration with neighbors    Feels lonely    Good role model for her daughter    Talking about past family history    Disorganized thoughts    Rambling from one topic to another    See's therapist  once a week. Goes to a day program.     A: Has decided after talking out her options with writer.  She is going to stay home right now.  R:  To help herself now is going to use some of the \"good behaviors\" she has learned in therapy.  Going to listen to relaxing, take a bath, journal,  and think good thoughts.    Valeria Quiles RN, MA  [facility] Triage Nurse Advisor      Reason for Disposition    [1] Depression AND [2] NO worsening or change    Additional Information    Negative: Patient attempted suicide    Negative: Patient is threatening suicide now    Negative: Violent behavior, or threatening to physically hurt or kill someone    Negative: [1] Patient is very confused (disoriented, slurred speech) AND [2] no other adult (e.g., friend or family member) available    Negative: [1] Difficult to awaken or acting very confused (disoriented, slurred speech) AND [2] new-onset    Negative: Sounds like a life-threatening emergency to the triager    Negative: [1] Depression AND [2] unable to do any of normal activities (e.g., self care, school, work; in comparison to baseline).    Negative: Very strange or confused behavior    Negative: Patient sounds very sick or weak to the triager    Negative: [1] Depression AND [2] worsening (e.g.,sleeping poorly, less able to do activities of daily living)    Negative: Symptoms interfere with work or school    Negative: Sometimes has thoughts of suicide    Negative: Fever > 101 F (38.3 C)    Negative: [1] Started on anti-depressant " medications < 2 weeks ago AND [2] not feeling any better    Negative: Feels depressed only on days just before menstrual period    Negative: Pregnant    Protocols used: DEPRESSION-A-AH

## 2022-08-16 NOTE — TELEPHONE ENCOUNTER
Shirley,    Please see information about [facility] worker. Nicci PONCE RN

## 2022-08-18 DIAGNOSIS — E03.8 OTHER SPECIFIED HYPOTHYROIDISM: ICD-10-CM

## 2022-08-19 RX ORDER — LEVOTHYROXINE SODIUM 50 UG/1
TABLET ORAL
Qty: 30 TABLET | Refills: 0 | Status: SHIPPED | OUTPATIENT
Start: 2022-08-19 | End: 2022-10-05

## 2022-08-19 NOTE — TELEPHONE ENCOUNTER
"Requested Prescriptions   Pending Prescriptions Disp Refills    levothyroxine (SYNTHROID/LEVOTHROID) 50 MCG tablet [Pharmacy Med Name: LEVOTHYROXINE 50 MCG TABLET] 30 tablet 0     Sig: TAKE 1 TABLET BY MOUTH EVERY DAY BEFORE BREAKFAST        Thyroid Protocol Failed - 8/18/2022  2:14 PM        Failed - Normal TSH on file in past 12 months     Recent Labs   Lab Test 06/17/22  2354   TSH 9.74*                Passed - Patient is 12 years or older        Passed - Recent (12 mo) or future (30 days) visit within the authorizing provider's specialty     Patient has had an office visit with the authorizing provider or a provider within the authorizing providers department within the previous 12 mos or has a future within next 30 days. See \"Patient Info\" tab in inbasket, or \"Choose Columns\" in Meds & Orders section of the refill encounter.              Passed - Medication is active on med list        Passed - No active pregnancy on record     If patient is pregnant or has had a positive pregnancy test, please check TSH.          Passed - No positive pregnancy test in past 12 months     If patient is pregnant or has had a positive pregnancy test, please check TSH.                  "

## 2022-08-23 ASSESSMENT — ANXIETY QUESTIONNAIRES
GAD7 TOTAL SCORE: 18
2. NOT BEING ABLE TO STOP OR CONTROL WORRYING: NEARLY EVERY DAY
7. FEELING AFRAID AS IF SOMETHING AWFUL MIGHT HAPPEN: NEARLY EVERY DAY
4. TROUBLE RELAXING: NEARLY EVERY DAY
GAD7 TOTAL SCORE: 18
IF YOU CHECKED OFF ANY PROBLEMS ON THIS QUESTIONNAIRE, HOW DIFFICULT HAVE THESE PROBLEMS MADE IT FOR YOU TO DO YOUR WORK, TAKE CARE OF THINGS AT HOME, OR GET ALONG WITH OTHER PEOPLE: EXTREMELY DIFFICULT
GAD7 TOTAL SCORE: 18
5. BEING SO RESTLESS THAT IT IS HARD TO SIT STILL: NOT AT ALL
1. FEELING NERVOUS, ANXIOUS, OR ON EDGE: NEARLY EVERY DAY
6. BECOMING EASILY ANNOYED OR IRRITABLE: NEARLY EVERY DAY
8. IF YOU CHECKED OFF ANY PROBLEMS, HOW DIFFICULT HAVE THESE MADE IT FOR YOU TO DO YOUR WORK, TAKE CARE OF THINGS AT HOME, OR GET ALONG WITH OTHER PEOPLE?: EXTREMELY DIFFICULT
3. WORRYING TOO MUCH ABOUT DIFFERENT THINGS: NEARLY EVERY DAY
7. FEELING AFRAID AS IF SOMETHING AWFUL MIGHT HAPPEN: NEARLY EVERY DAY

## 2022-08-23 ASSESSMENT — PATIENT HEALTH QUESTIONNAIRE - PHQ9
10. IF YOU CHECKED OFF ANY PROBLEMS, HOW DIFFICULT HAVE THESE PROBLEMS MADE IT FOR YOU TO DO YOUR WORK, TAKE CARE OF THINGS AT HOME, OR GET ALONG WITH OTHER PEOPLE: EXTREMELY DIFFICULT
SUM OF ALL RESPONSES TO PHQ QUESTIONS 1-9: 13
SUM OF ALL RESPONSES TO PHQ QUESTIONS 1-9: 13

## 2022-08-25 ENCOUNTER — OFFICE VISIT (OUTPATIENT)
Dept: FAMILY MEDICINE | Facility: CLINIC | Age: 37
End: 2022-08-25
Payer: MEDICARE

## 2022-08-25 VITALS
OXYGEN SATURATION: 97 % | HEART RATE: 80 BPM | SYSTOLIC BLOOD PRESSURE: 126 MMHG | DIASTOLIC BLOOD PRESSURE: 80 MMHG | HEIGHT: 63 IN | BODY MASS INDEX: 41.82 KG/M2 | WEIGHT: 236 LBS | RESPIRATION RATE: 16 BRPM | TEMPERATURE: 98.2 F

## 2022-08-25 DIAGNOSIS — M54.41 CHRONIC RIGHT-SIDED LOW BACK PAIN WITH RIGHT-SIDED SCIATICA: ICD-10-CM

## 2022-08-25 DIAGNOSIS — E11.9 TYPE 2 DIABETES MELLITUS WITHOUT COMPLICATION, WITHOUT LONG-TERM CURRENT USE OF INSULIN (H): Primary | ICD-10-CM

## 2022-08-25 DIAGNOSIS — G89.29 CHRONIC RIGHT-SIDED LOW BACK PAIN WITH RIGHT-SIDED SCIATICA: ICD-10-CM

## 2022-08-25 DIAGNOSIS — F25.0 SCHIZOAFFECTIVE DISORDER, BIPOLAR TYPE (H): ICD-10-CM

## 2022-08-25 PROCEDURE — 99214 OFFICE O/P EST MOD 30 MIN: CPT | Performed by: NURSE PRACTITIONER

## 2022-08-25 RX ORDER — LEVOTHYROXINE SODIUM 25 UG/1
TABLET ORAL
COMMUNITY
Start: 2022-06-06 | End: 2022-08-30

## 2022-08-25 RX ORDER — DOCUSATE SODIUM 50MG AND SENNOSIDES 8.6MG 8.6; 5 MG/1; MG/1
TABLET, FILM COATED ORAL
COMMUNITY
Start: 2022-08-08 | End: 2022-08-30

## 2022-08-25 RX ORDER — LAMOTRIGINE 100 MG/1
100 TABLET ORAL DAILY
COMMUNITY
Start: 2022-08-18 | End: 2023-02-16 | Stop reason: DRUGHIGH

## 2022-08-25 RX ORDER — LITHIUM CARBONATE 300 MG/1
TABLET, FILM COATED, EXTENDED RELEASE ORAL DAILY
COMMUNITY
Start: 2022-07-09 | End: 2023-02-16

## 2022-08-25 RX ORDER — HYDROXYZINE HYDROCHLORIDE 50 MG/1
TABLET, FILM COATED ORAL
COMMUNITY
Start: 2022-08-18 | End: 2023-02-16

## 2022-08-25 RX ORDER — OLANZAPINE 15 MG/1
TABLET ORAL
COMMUNITY
Start: 2022-07-25 | End: 2022-08-25

## 2022-08-25 ASSESSMENT — ANXIETY QUESTIONNAIRES: GAD7 TOTAL SCORE: 18

## 2022-08-25 ASSESSMENT — PATIENT HEALTH QUESTIONNAIRE - PHQ9
10. IF YOU CHECKED OFF ANY PROBLEMS, HOW DIFFICULT HAVE THESE PROBLEMS MADE IT FOR YOU TO DO YOUR WORK, TAKE CARE OF THINGS AT HOME, OR GET ALONG WITH OTHER PEOPLE: EXTREMELY DIFFICULT
SUM OF ALL RESPONSES TO PHQ QUESTIONS 1-9: 13

## 2022-08-25 ASSESSMENT — PAIN SCALES - GENERAL: PAINLEVEL: NO PAIN (0)

## 2022-08-25 NOTE — PROGRESS NOTES
Assessment & Plan     Type 2 diabetes mellitus without complication, without long-term current use of insulin (H)  Patient reports that her diabetes control has been good at home.  She is tolerating the Victoza well and is having weight loss.  She has no concerns about her diabetes today.  Will be due for A1c recheck end of September or early October.    Schizoaffective disorder, bipolar type (H)  Patient was requesting referral to psychiatry for psychiatric testing.  She has been following with a psychiatrist long-term.  She is questioning the care she is receiving.  Has been having a lot of trouble tolerating the medications  - Adult Mental Health  Referral; Future    Chronic right-sided low back pain with right-sided sciatica  Improving with PT and chiropractic care.    The risks, benefits and treatment options of prescribed medications or other treatments have been discussed with the patient. The patient verbalized their understanding and should call or follow up if no improvement or if they develop further problems.    PHILL Luo [facility]                Terry Castañeda is a 36 year old, presenting for the following health issues:  Mental Health Problem and Back Pain      History of Present Illness       Back Pain:  She presents for follow up of back pain. Patient's back pain is a chronic problem.  Location of back pain:  Right lower back, right side of neck, right shoulder and right hip  Description of back pain: cramping, dull ache, sharp and stabbing  Back pain spreads: right knee and left foot    Since patient first noticed back pain, pain is: always present, but gets better and worse  Does back pain interfere with her job:  Yes      Mental Health Follow-up:  Patient presents to follow-up on Depression & Anxiety.Patient's depression since last visit has been:  Bad  The patient is having other symptoms associated with depression.  Patient's anxiety since  last visit has been:  Worse  The patient is having other symptoms associated with anxiety.  Any significant life events: relationship concerns, job concerns, financial concerns, housing concerns, grief or loss, health concerns and other  Patient is feeling anxious or having panic attacks.  Patient has no concerns about alcohol or drug use.    Diabetes:   She presents for follow up of diabetes.  She is checking home blood glucose a few times a month. She checks blood glucose before meals.  Blood glucose is never over 200 and never under 70. When her blood glucose is low, the patient is asymptomatic for confusion, blurred vision, lethargy and reports not feeling dizzy, shaky, or weak.  She has no concerns regarding her diabetes at this time.  She is having excessive thirst. The patient has had a diabetic eye exam in the last 12 months. Eye exam performed on 8/4/2022. Location of last eye exam [facility].        Reason for visit:  Mental health, diabetes management, follow up back pain, ask to clear ears of wax    She eats 0-1 servings of fruits and vegetables daily.She consumes 0 sweetened beverage(s) daily.She exercises with enough effort to increase her heart rate 20 to 29 minutes per day.  She exercises with enough effort to increase her heart rate 3 or less days per week. She is missing 1 dose(s) of medications per week.  She is not taking prescribed medications regularly due to remembering to take.    Today's PHQ-9         PHQ-9 Total Score: 13    PHQ-9 Q9 Thoughts of better off dead/self-harm past 2 weeks :   Nearly every day  Thoughts of suicide or self harm: (P) No  Self-harm Plan:     Self-harm Action:       Safety concerns for self or others: (P) No    How difficult have these problems made it for you to do your work, take care of things at home, or get along with other people: Extremely difficult  Today's AYAKA-7 Score: 18       Back pain:  Getting better  Currently in PT and seeing a  "chiropractor.      Mental health:  She believes that she needs new psychiatric diagnostic testing.      Diabetes:  Going well.  Losing weight with the Victoza    Wt Readings from Last 4 Encounters:   08/25/22 107 kg (236 lb)   08/11/22 106.6 kg (235 lb)   08/08/22 111.1 kg (245 lb)   07/22/22 111.2 kg (245 lb 2.4 oz)       Check ears for wax:  No symptoms.            Review of Systems   Constitutional, HEENT, cardiovascular, pulmonary, gi and gu systems are negative, except as otherwise noted.      Objective    /80 (BP Location: Right arm, Cuff Size: Adult Large)   Pulse 80   Temp 98.2  F (36.8  C) (Tympanic)   Resp 16   Ht 1.6 m (5' 3\")   Wt 107 kg (236 lb)   LMP 07/28/2022 (Approximate)   SpO2 97%   BMI 41.81 kg/m    Body mass index is 41.81 kg/m .  Physical Exam   GENERAL: healthy, alert and no distress  HENT: ear canals and TM's normal - no cerumen  NECK: no adenopathy, no asymmetry, masses, or scars and thyroid normal to palpation  RESP: lungs clear to auscultation - no rales, rhonchi or wheezes  CV: regular rate and rhythm, normal S1 S2, no S3 or S4, no murmur, click or rub, no peripheral edema and peripheral pulses strong  MS: no gross musculoskeletal defects noted, no edema                    .  ..  "

## 2022-08-25 NOTE — PROGRESS NOTES
Mechelle returned your call ([facility]).  She can be reached at .  Thank you  Ivanna Arciniega on 8/25/2022 at 2:36 PM

## 2022-08-25 NOTE — PROGRESS NOTES
[facility] Non Surgical Follow up Visit   Date of visit: 8/30/2022  Physician: LONDON Serna MD, MD  Primary Care is Shirley Freeman.  Maddy Ortiz   36 year old  female     Initial Weight: 202#  Initial BMI: 34.14  Today's Weight:   Wt Readings from Last 1 Encounters:   08/11/22 106.6 kg (235 lb)     There is no height or weight on file to calculate BMI.           Assessment and Plan   Assessment: Maddy is a 36 year old year old female who presents for medical weight management.      Plan:    1. Obesity, Class III, BMI 40-49.9 (morbid obesity) (H)  Patient was congratulated on her success thus far. Healthy habits to assist with further weight loss were discussed. She will work on getting adequate protein and will continue to try to increase her vegetables. She will continue the victoza. We discussed the patient's co-morbid conditions including Type 2 DM and GERD. These likely will improve with healthy habits and weight loss.    2. Type 2 diabetes mellitus without complication, without long-term current use of insulin (H)  This may improve with healthy habits and weight loss.    3. Gastroesophageal reflux disease without esophagitis  This may improve with healthy habits and weight loss.    Follow up in 3 months with myself           INTERIM HISTORY  Patient is taking liraglutide and thinks that it is helping to control her appetite. She is having some constipation which she is able to manage it. She is going to have an umbilical hernia repair tomorrow.     DIETARY HISTORY  Meals Per Day: 2-3  Eating Protein First?: sometimes  Food Diary: B:cereal (corn flakes) and milk L:sandwich on wheat bread with turkey and some salad D:salad   Snacks Per Day: occasional  Typical Snack: fruit  Fluid Intake: working on it   Portion Control: yes  Calorie Containing Beverages: rare soda  Typical Protein Food Choices: meat  Choosing Whole Grains: sometimes  Meals at Restaurant per week:0-1    Positive Changes Since  Last Visit: less eating out, significantly decreased sugared beverages, increased vegetables  Struggling With: food insecurity, protein intake    Knowledgeable in Reading Food Labels: not sure  Getting Adequate Protein: sometimes  Sleeping 7-8 hours/day sometimes      PHYSICAL ACTIVITY PATTERNS:  Some walking most days    REVIEW OF SYSTEMS  GENERAL/CONSTITUTIONAL:  Fatigue: yes  PSYCHIATRIC:  Moods: fairly stable  ENDOCRINE:  Monitoring Blood Sugars: yes  Sugars Well Controlled: yes  No personal or family history of medullary thyroid cancer not they she knows of (adopted)  :  Birth control: plans to have IUD replaced. She is currently not sexually active.       Patient Profile   Social History     Social History Narrative    One child    Education: some college        July 22, 2022    ENVIRONMENTAL HISTORY: [place] The home is heated with a electric furnace. They do not have central air conditioning, does have box air conditioner in the wall and has air purifier. The patient's bedroom is furnished with stuffed animals in bed, carpeting in bedroom and fabric window coverings. Pets inside the apartment includes 1 cat. There is history of cockroach or mice infestation. There are no smokers in the house.  The apartment does not have a basement.         Past Medical History   Past Medical History:   Diagnosis Date     Bipolar 1 disorder (H) 12/6/2012     Depressive disorder      Diabetes mellitus, type 2 (H) 12/13/2021     Paranoid type delusional disorder (H) 11/14/2012     Patient Active Problem List   Diagnosis     Animal dander allergy     CARDIOVASCULAR SCREENING; LDL GOAL LESS THAN 160     Psychosis (H)     Paranoid type delusional disorder (H)     Bipolar 1 disorder (H)     Insomnia     Depression with anxiety     Seasonal allergic rhinitis due to pollen     Allergic rhinitis due to mold     Allergic rhinitis due to animal dander     Allergic rhinitis due to  dust mite     Encounter for IUD insertion     Schizoaffective disorder, bipolar type (H)     Gastroesophageal reflux disease without esophagitis     Paranoia (psychosis) (H)     Morbid obesity (H)     Schizoaffective disorder (H)     Umbilical hernia without obstruction and without gangrene     Fatty liver     Diabetes mellitus, type 2 (H)     Elevated LFTs     Disorganized behavior     Infection due to 2019 novel coronavirus     Polypharmacy     Sedated due to multiple medications     Overdose, undetermined intent, initial encounter     Segmental and somatic dysfunction     SI (sacroiliac) joint dysfunction       Past Surgical History  She has a past surgical history that includes tonsillectomy & adenoidectomy and tonsillectomy & adenoidectomy.     Examination   LMP 07/28/2022 (Approximate)   GENERAL: Healthy, alert and no distress  EYES: Eyes grossly normal to inspection.  No discharge or erythema, or obvious scleral/conjunctival abnormalities.  RESP: No audible wheeze, cough, or visible cyanosis.  No visible retractions or increased work of breathing.    SKIN: Visible skin clear. No significant rash, abnormal pigmentation or lesions.  NEURO: Cranial nerves grossly intact.  Mentation and speech appropriate for age.  PSYCH: Mentation appears normal, affect normal/bright, judgement and insight intact, normal speech and appearance well-groomed.       Counseling:   We reviewed the important post op bariatric recommendations:  -eating 3 meals daily  -eating protein first, getting >60gm protein daily  -eating slowly, chewing food well  -avoiding/limiting calorie containing beverages  -limiting starchy vegetables and carbohydrates, choosing wheat, not white with breads,   crackers, pastas, marquita, bagels, tortillas, rice  -limiting restaurant or cafeteria eating to twice a week or less    We discussed the importance of restorative sleep and stress management in maintaining a healthy weight.  We discussed the National  Weight Control Registry healthy weight maintenance strategies and ways to optimize metabolism.  We discussed the importance of physical activity including cardiovascular and strength training in maintaining a healthier weight.    Total time spent on the date of this encounter doing: chart review, review of test results, patient visit, physical exam, education, counseling, developing plan of care and documenting = 30 minutes.         LONDON Serna MD  [facility]

## 2022-08-29 ENCOUNTER — LAB (OUTPATIENT)
Dept: FAMILY MEDICINE | Facility: CLINIC | Age: 37
End: 2022-08-29
Attending: SURGERY
Payer: MEDICARE

## 2022-08-29 DIAGNOSIS — Z20.822 ENCOUNTER FOR LABORATORY TESTING FOR COVID-19 VIRUS: ICD-10-CM

## 2022-08-29 DIAGNOSIS — K42.9 UMBILICAL HERNIA WITHOUT OBSTRUCTION AND WITHOUT GANGRENE: ICD-10-CM

## 2022-08-29 LAB — SARS-COV-2 RNA RESP QL NAA+PROBE: NEGATIVE

## 2022-08-29 PROCEDURE — U0005 INFEC AGEN DETEC AMPLI PROBE: HCPCS

## 2022-08-29 PROCEDURE — U0003 INFECTIOUS AGENT DETECTION BY NUCLEIC ACID (DNA OR RNA); SEVERE ACUTE RESPIRATORY SYNDROME CORONAVIRUS 2 (SARS-COV-2) (CORONAVIRUS DISEASE [COVID-19]), AMPLIFIED PROBE TECHNIQUE, MAKING USE OF HIGH THROUGHPUT TECHNOLOGIES AS DESCRIBED BY CMS-2020-01-R: HCPCS

## 2022-08-30 ENCOUNTER — TELEPHONE (OUTPATIENT)
Dept: FAMILY MEDICINE | Facility: CLINIC | Age: 37
End: 2022-08-30

## 2022-08-30 ENCOUNTER — VIRTUAL VISIT (OUTPATIENT)
Dept: SURGERY | Facility: CLINIC | Age: 37
End: 2022-08-30
Payer: MEDICARE

## 2022-08-30 ENCOUNTER — TELEPHONE (OUTPATIENT)
Dept: SURGERY | Facility: CLINIC | Age: 37
End: 2022-08-30

## 2022-08-30 VITALS — WEIGHT: 236 LBS | BODY MASS INDEX: 41.82 KG/M2 | HEIGHT: 63 IN

## 2022-08-30 DIAGNOSIS — E66.01 OBESITY, CLASS III, BMI 40-49.9 (MORBID OBESITY) (H): ICD-10-CM

## 2022-08-30 DIAGNOSIS — E66.01 OBESITY, CLASS III, BMI 40-49.9 (MORBID OBESITY) (H): Primary | ICD-10-CM

## 2022-08-30 DIAGNOSIS — E11.9 TYPE 2 DIABETES MELLITUS WITHOUT COMPLICATION, WITHOUT LONG-TERM CURRENT USE OF INSULIN (H): ICD-10-CM

## 2022-08-30 DIAGNOSIS — K21.9 GASTROESOPHAGEAL REFLUX DISEASE WITHOUT ESOPHAGITIS: ICD-10-CM

## 2022-08-30 DIAGNOSIS — E11.9 TYPE 2 DIABETES MELLITUS WITHOUT COMPLICATION, WITHOUT LONG-TERM CURRENT USE OF INSULIN (H): Primary | ICD-10-CM

## 2022-08-30 PROCEDURE — 99214 OFFICE O/P EST MOD 30 MIN: CPT | Mod: 95 | Performed by: FAMILY MEDICINE

## 2022-08-30 PROCEDURE — 97803 MED NUTRITION INDIV SUBSEQ: CPT | Mod: 95 | Performed by: DIETITIAN, REGISTERED

## 2022-08-30 RX ORDER — FENTANYL CITRATE 50 UG/ML
25 INJECTION, SOLUTION INTRAMUSCULAR; INTRAVENOUS EVERY 5 MIN PRN
Status: CANCELLED | OUTPATIENT
Start: 2022-08-30

## 2022-08-30 RX ORDER — SODIUM CHLORIDE, SODIUM LACTATE, POTASSIUM CHLORIDE, CALCIUM CHLORIDE 600; 310; 30; 20 MG/100ML; MG/100ML; MG/100ML; MG/100ML
INJECTION, SOLUTION INTRAVENOUS CONTINUOUS
Status: CANCELLED | OUTPATIENT
Start: 2022-08-30

## 2022-08-30 RX ORDER — OXYCODONE HYDROCHLORIDE 5 MG/1
5 TABLET ORAL EVERY 4 HOURS PRN
Status: CANCELLED | OUTPATIENT
Start: 2022-08-30

## 2022-08-30 RX ORDER — ONDANSETRON 2 MG/ML
4 INJECTION INTRAMUSCULAR; INTRAVENOUS EVERY 30 MIN PRN
Status: CANCELLED | OUTPATIENT
Start: 2022-08-30

## 2022-08-30 RX ORDER — ONDANSETRON 4 MG/1
4 TABLET, ORALLY DISINTEGRATING ORAL EVERY 30 MIN PRN
Status: CANCELLED | OUTPATIENT
Start: 2022-08-30

## 2022-08-30 RX ORDER — ACETAMINOPHEN 325 MG/1
975 TABLET ORAL ONCE
Status: CANCELLED | OUTPATIENT
Start: 2022-08-30 | End: 2022-08-30

## 2022-08-30 RX ORDER — FENTANYL CITRATE 50 UG/ML
25 INJECTION, SOLUTION INTRAMUSCULAR; INTRAVENOUS
Status: CANCELLED | OUTPATIENT
Start: 2022-08-30

## 2022-08-30 RX ORDER — MEPERIDINE HYDROCHLORIDE 25 MG/ML
12.5 INJECTION INTRAMUSCULAR; INTRAVENOUS; SUBCUTANEOUS
Status: CANCELLED | OUTPATIENT
Start: 2022-08-30

## 2022-08-30 RX ORDER — LIDOCAINE 40 MG/G
CREAM TOPICAL
Status: CANCELLED | OUTPATIENT
Start: 2022-08-30

## 2022-08-30 NOTE — TELEPHONE ENCOUNTER
Pt called asking if her recent visit on 8/25/22 would qualify for a pre-op exam?    Pt has hernia surgery scheduled TOMORROW.    Pt says she missed that she needed to have a pre-op.    Need to call Kristel, pre-op coordinator, [phone], to inform of status of this.  Pre-op completed or surgery canceled and rescheduled?    Elvira Quinn RN

## 2022-08-30 NOTE — TELEPHONE ENCOUNTER
Shirley is not in clinic this afternoon.  Routed to provider of day, Ewelina Esparza.    Elvira Quinn RN

## 2022-08-30 NOTE — PROGRESS NOTES
Maddy Ortiz is a 36 year old who is being evaluated via a billable video visit.      How would you like to obtain your AVS? MyChart  If the video visit is dropped, the invitation should be resent by: Send to e-mail at: [email or web address]  Will anyone else be joining your video visit? No      Video Start Time: 12:32 PM      Medical  Weight Loss Follow-Up Diet Evaluation  Assessment:  Maddy is presenting today for a follow up weight management nutrition consultation. Pt has had an initial appointment with Dr. Serna  Weight loss medication: victoza- taking 1.8, is no longer taking metformin due to upset stomach  Pt's weight is 236 lbs 0 oz  Initial weight: 202 lb  Weight change: down 10lb since June- nearly 20lb this year    Changes and Difficulties 5/28/2022   I have made the following changes to my diet since my last visit: meal planning   With regards to my diet, I am still struggling with: -   I have made the following changes to my activity/exercise since my last visit: active physical activity   With regards to my activity/exercise, I am still struggling with: rest - tend to work hard 1 week rest the next week     BMI: Body mass index is 41.81 kg/m .  Ideal body weight: 52.4 kg (115 lb 8.3 oz)  Adjusted ideal body weight: 74.3 kg (163 lb 11.4 oz)    Estimated RMR ([place]-St Jeor equation):   1727 kcals x 1.2 (sedentary) = 2072 kcals (for weight maintenance)  Recommended Protein Intake: 60-80 grams of protein/day  Patient Active Problem List:  Patient Active Problem List   Diagnosis     Animal dander allergy     CARDIOVASCULAR SCREENING; LDL GOAL LESS THAN 160     Psychosis (H)     Paranoid type delusional disorder (H)     Bipolar 1 disorder (H)     Insomnia     Depression with anxiety     Seasonal allergic rhinitis due to pollen     Allergic rhinitis due to mold     Allergic rhinitis due to animal dander     Allergic rhinitis due to dust mite     Encounter for IUD insertion     Schizoaffective disorder,  bipolar type (H)     Gastroesophageal reflux disease without esophagitis     Paranoia (psychosis) (H)     Morbid obesity (H)     Schizoaffective disorder (H)     Umbilical hernia without obstruction and without gangrene     Fatty liver     Type 2 diabetes mellitus without complication, without long-term current use of insulin (H)     Elevated LFTs     Disorganized behavior     Infection due to 2019 novel coronavirus     Polypharmacy     Sedated due to multiple medications     Overdose, undetermined intent, initial encounter     Segmental and somatic dysfunction     SI (sacroiliac) joint dysfunction     Diabetes: reports blood glucose running around 100- not checking regularly  Most recent A1c 6.4 in June    Progress on goals from last visit: has been working on not snacking as much  -reports healthier foods and eating smaller portions- more vegetables, salad 1-3 servings per day  -eating foods from the food shelf  -drinking a lot more water    -struggling with eating breakfast and not checking blood glucose regularly    Dietary Recall:  Breakfast: skipping a lot  Lunch: sandwich and a lot of pretzels- states she is really tired today  Dinner:  donald reis salad from grocery store  Beverages: 5 bottles of water per day  Exercise: does a lot of walking- today walked to [facility] and back (1/3 mile)    Nutrition Diagnosis:    Overweight/Obesity (NC 3.3) related to overeating and poor lifestyle habits as evidenced by patient's report of inconsistent meals, large portions, frequent snacking of sweets, lack of activity and BMI 41.81      Intervention:  1. Food and/or nutrient delivery: continue to aim for 3 meals per day, moving as much as possible and boosting the veggies  2. Nutrition education: discussed guidelines for testing blood glucose- she gets 1 strip a day so was encouraged to test at a different time each day to get an idea of what blood glucose looks like  3. Nutrition counseling: goal  setting    Monitoring/Evaluation:    Goals:  1. Test blood glucose daily  2. Eat breakfast daily  3. Continue to increase veggies    Patient to follow up in 3 month(s) with bariatrician and 3 month(s) with RD      Video-Visit Details    Type of service:  Video Visit    Video End Time:12:45 PM    Originating Location (pt. Location): Home    Distant Location (provider location):  [facility] [place]     Platform used for Video Visit: Perri FAULKNER RD

## 2022-08-30 NOTE — LETTER
8/30/2022         RE: Maddy Ortiz  [address]  [facility] [address]        Dear Colleague,    Thank you for referring your patient, Maddy Ortiz, to the [facility] [place]. Please see a copy of my visit note below.    Maddy Ortiz is a 36 year old who is being evaluated via a billable video visit.      How would you like to obtain your AVS? MyChart  If the video visit is dropped, the invitation should be resent by: Send to e-mail at: [email or web address]  Will anyone else be joining your video visit? No      Video Start Time: 12:32 PM      Medical  Weight Loss Follow-Up Diet Evaluation  Assessment:  Maddy is presenting today for a follow up weight management nutrition consultation. Pt has had an initial appointment with Dr. Serna  Weight loss medication: victoza- taking 1.8, is no longer taking metformin due to upset stomach  Pt's weight is 236 lbs 0 oz  Initial weight: 202 lb  Weight change: down 10lb since June- nearly 20lb this year    Changes and Difficulties 5/28/2022   I have made the following changes to my diet since my last visit: meal planning   With regards to my diet, I am still struggling with: -   I have made the following changes to my activity/exercise since my last visit: active physical activity   With regards to my activity/exercise, I am still struggling with: rest - tend to work hard 1 week rest the next week     BMI: Body mass index is 41.81 kg/m .  Ideal body weight: 52.4 kg (115 lb 8.3 oz)  Adjusted ideal body weight: 74.3 kg (163 lb 11.4 oz)    Estimated RMR ([place]-St Jeor equation):   1727 kcals x 1.2 (sedentary) = 2072 kcals (for weight maintenance)  Recommended Protein Intake: 60-80 grams of protein/day  Patient Active Problem List:  Patient Active Problem List   Diagnosis     Animal dander allergy     CARDIOVASCULAR SCREENING; LDL GOAL LESS THAN 160     Psychosis (H)     Paranoid type delusional disorder (H)      Bipolar 1 disorder (H)     Insomnia     Depression with anxiety     Seasonal allergic rhinitis due to pollen     Allergic rhinitis due to mold     Allergic rhinitis due to animal dander     Allergic rhinitis due to dust mite     Encounter for IUD insertion     Schizoaffective disorder, bipolar type (H)     Gastroesophageal reflux disease without esophagitis     Paranoia (psychosis) (H)     Morbid obesity (H)     Schizoaffective disorder (H)     Umbilical hernia without obstruction and without gangrene     Fatty liver     Type 2 diabetes mellitus without complication, without long-term current use of insulin (H)     Elevated LFTs     Disorganized behavior     Infection due to 2019 novel coronavirus     Polypharmacy     Sedated due to multiple medications     Overdose, undetermined intent, initial encounter     Segmental and somatic dysfunction     SI (sacroiliac) joint dysfunction     Diabetes: reports blood glucose running around 100- not checking regularly  Most recent A1c 6.4 in June    Progress on goals from last visit: has been working on not snacking as much  -reports healthier foods and eating smaller portions- more vegetables, salad 1-3 servings per day  -eating foods from the food shelf  -drinking a lot more water    -struggling with eating breakfast and not checking blood glucose regularly    Dietary Recall:  Breakfast: skipping a lot  Lunch: sandwich and a lot of pretzels- states she is really tired today  Dinner:  ramen deli salad from grocery store  Beverages: 5 bottles of water per day  Exercise: does a lot of walking- today walked to [facility] and back (1/3 mile)    Nutrition Diagnosis:    Overweight/Obesity (NC 3.3) related to overeating and poor lifestyle habits as evidenced by patient's report of inconsistent meals, large portions, frequent snacking of sweets, lack of activity and BMI 41.81      Intervention:  1. Food and/or nutrient delivery: continue to aim for 3 meals per day, moving as much as  possible and boosting the veggies  2. Nutrition education: discussed guidelines for testing blood glucose- she gets 1 strip a day so was encouraged to test at a different time each day to get an idea of what blood glucose looks like  3. Nutrition counseling: goal setting    Monitoring/Evaluation:    Goals:  1. Test blood glucose daily  2. Eat breakfast daily  3. Continue to increase veggies    Patient to follow up in 3 month(s) with bariatrician and 3 month(s) with RD      Video-Visit Details    Type of service:  Video Visit    Video End Time:12:45 PM    Originating Location (pt. Location): Home    Distant Location (provider location):  [facility] [place]     Platform used for Video Visit: Perri FAULKNER RD        Again, thank you for allowing me to participate in the care of your patient.        Sincerely,        KARLA FAULKNER RD

## 2022-08-30 NOTE — TELEPHONE ENCOUNTER
I called Kristel pre-op coord.  She reached pt's surgeon who has agreed to do the pre-op prior to surgery tomorrow.  Elvira Quinn RN

## 2022-08-30 NOTE — TELEPHONE ENCOUNTER
Left VM message asking patient to call me to let me know where she had her preop H&P done, surgery scheduled 8/31 with Dr. Montero.

## 2022-08-30 NOTE — OR NURSING
Spoke to patient regarding no H&P.  She was unaware that she needed one.  She did have an appt with her PCP on 8/25, but not a pre-op.      I advised her to call her PCP to see if they can make an addendum to the visit note to clear patient for surgery.  If not, she will need to be rescheduled due to no H&P.    Pt is going to call me back after she tries to reach her provider.

## 2022-08-30 NOTE — LETTER
8/30/2022         RE: Maddy Ortiz  [address]  [facility] [address]        Dear Colleague,    Thank you for referring your patient, Maddy Ortiz, to the [facility] [place]. Please see a copy of my visit note below.    [facility] Clinic Non Surgical Follow up Visit   Date of visit: 8/30/2022  Physician: LONDON Serna MD, MD  Primary Care is Shirley Freeman.  Maddy Ortiz   36 year old  female     Initial Weight: 202#  Initial BMI: 34.14  Today's Weight:   Wt Readings from Last 1 Encounters:   08/11/22 106.6 kg (235 lb)     There is no height or weight on file to calculate BMI.           Assessment and Plan   Assessment: Maddy is a 36 year old year old female who presents for medical weight management.      Plan:    1. Obesity, Class III, BMI 40-49.9 (morbid obesity) (H)  Patient was congratulated on her success thus far. Healthy habits to assist with further weight loss were discussed. She will work on getting adequate protein and will continue to try to increase her vegetables. She will continue the victoza. We discussed the patient's co-morbid conditions including Type 2 DM and GERD. These likely will improve with healthy habits and weight loss.    2. Type 2 diabetes mellitus without complication, without long-term current use of insulin (H)  This may improve with healthy habits and weight loss.    3. Gastroesophageal reflux disease without esophagitis  This may improve with healthy habits and weight loss.    Follow up in 3 months with myself           INTERIM HISTORY  Patient is taking liraglutide and thinks that it is helping to control her appetite. She is having some constipation which she is able to manage it. She is going to have an umbilical hernia repair tomorrow.     DIETARY HISTORY  Meals Per Day: 2-3  Eating Protein First?: sometimes  Food Diary: B:cereal (corn flakes) and milk L:sandwich on wheat bread with turkey and some  salad D:salad   Snacks Per Day: occasional  Typical Snack: fruit  Fluid Intake: working on it   Portion Control: yes  Calorie Containing Beverages: rare soda  Typical Protein Food Choices: meat  Choosing Whole Grains: sometimes  Meals at Restaurant per week:0-1    Positive Changes Since Last Visit: less eating out, significantly decreased sugared beverages, increased vegetables  Struggling With: food insecurity, protein intake    Knowledgeable in Reading Food Labels: not sure  Getting Adequate Protein: sometimes  Sleeping 7-8 hours/day sometimes      PHYSICAL ACTIVITY PATTERNS:  Some walking most days    REVIEW OF SYSTEMS  GENERAL/CONSTITUTIONAL:  Fatigue: yes  PSYCHIATRIC:  Moods: fairly stable  ENDOCRINE:  Monitoring Blood Sugars: yes  Sugars Well Controlled: yes  No personal or family history of medullary thyroid cancer not they she knows of (adopted)  :  Birth control: plans to have IUD replaced. She is currently not sexually active.       Patient Profile   Social History     Social History Narrative    One child    Education: some college        July 22, 2022    ENVIRONMENTAL HISTORY: [place] The home is heated with a electric furnace. They do not have central air conditioning, does have box air conditioner in the wall and has air purifier. The patient's bedroom is furnished with stuffed animals in bed, carpeting in bedroom and fabric window coverings. Pets inside the apartment includes 1 cat. There is history of cockroach or mice infestation. There are no smokers in the house.  The apartment does not have a basement.         Past Medical History   Past Medical History:   Diagnosis Date     Bipolar 1 disorder (H) 12/6/2012     Depressive disorder      Diabetes mellitus, type 2 (H) 12/13/2021     Paranoid type delusional disorder (H) 11/14/2012     Patient Active Problem List   Diagnosis     Animal dander allergy     CARDIOVASCULAR SCREENING; LDL GOAL LESS  THAN 160     Psychosis (H)     Paranoid type delusional disorder (H)     Bipolar 1 disorder (H)     Insomnia     Depression with anxiety     Seasonal allergic rhinitis due to pollen     Allergic rhinitis due to mold     Allergic rhinitis due to animal dander     Allergic rhinitis due to dust mite     Encounter for IUD insertion     Schizoaffective disorder, bipolar type (H)     Gastroesophageal reflux disease without esophagitis     Paranoia (psychosis) (H)     Morbid obesity (H)     Schizoaffective disorder (H)     Umbilical hernia without obstruction and without gangrene     Fatty liver     Diabetes mellitus, type 2 (H)     Elevated LFTs     Disorganized behavior     Infection due to 2019 novel coronavirus     Polypharmacy     Sedated due to multiple medications     Overdose, undetermined intent, initial encounter     Segmental and somatic dysfunction     SI (sacroiliac) joint dysfunction       Past Surgical History  She has a past surgical history that includes tonsillectomy & adenoidectomy and tonsillectomy & adenoidectomy.     Examination   LMP 07/28/2022 (Approximate)   GENERAL: Healthy, alert and no distress  EYES: Eyes grossly normal to inspection.  No discharge or erythema, or obvious scleral/conjunctival abnormalities.  RESP: No audible wheeze, cough, or visible cyanosis.  No visible retractions or increased work of breathing.    SKIN: Visible skin clear. No significant rash, abnormal pigmentation or lesions.  NEURO: Cranial nerves grossly intact.  Mentation and speech appropriate for age.  PSYCH: Mentation appears normal, affect normal/bright, judgement and insight intact, normal speech and appearance well-groomed.       Counseling:   We reviewed the important post op bariatric recommendations:  -eating 3 meals daily  -eating protein first, getting >60gm protein daily  -eating slowly, chewing food well  -avoiding/limiting calorie containing beverages  -limiting starchy vegetables and carbohydrates,  choosing wheat, not white with breads,   crackers, pastas, marquita, bagels, tortillas, rice  -limiting restaurant or cafeteria eating to twice a week or less    We discussed the importance of restorative sleep and stress management in maintaining a healthy weight.  We discussed the National Weight Control Registry healthy weight maintenance strategies and ways to optimize metabolism.  We discussed the importance of physical activity including cardiovascular and strength training in maintaining a healthier weight.    Total time spent on the date of this encounter doing: chart review, review of test results, patient visit, physical exam, education, counseling, developing plan of care and documenting = 30 minutes.         LONDON Serna MD  [facility]             Maddy Ortiz is 36 year old  female who presents for a billable video visit today.    How would you like to obtain your AVS? MyChart  If dropped from the video visit, the video invitation should be resent by: Text to cell phone: [phone]  Will anyone else be joining your video visit? No      Video Start Time: 8:35 am    Are there any specific questions or needs that you would like addressed at your visit today? no        Video-Visit Details    Type of service:  Video Visit    Video End Time (time video stopped): 8:56 AM  Originating Location (pt. Location): Home    Distant Location (provider location):  [facility] [place]     Platform used for Video Visit: [employer]      Again, thank you for allowing me to participate in the care of your patient.        Sincerely,        LONDON Serna MD

## 2022-08-30 NOTE — TELEPHONE ENCOUNTER
Message received from Ewelina.  Ewelina reached out to Shirley Freeman who is not able to convert recent office visit into a pre-op.    I have searched the schedules of [place], [facility], [place], and [place] and no appts available.    Pt will need to need to reschedule surgery if a pre-op cannot be arranged AND pt cleared for surgery.    Elvira Quinn RN

## 2022-08-30 NOTE — TELEPHONE ENCOUNTER
I did not see patient in clinic and can not okay a pre op for her.  If patient can get in today at another clinic for pre op that would be her only option.  Ewelina Esparza, COTYP

## 2022-08-30 NOTE — PROGRESS NOTES
Maddy Ortiz is 36 year old  female who presents for a billable video visit today.    How would you like to obtain your AVS? MyChart  If dropped from the video visit, the video invitation should be resent by: Text to cell phone: [phone]  Will anyone else be joining your video visit? No      Video Start Time: 8:35 am    Are there any specific questions or needs that you would like addressed at your visit today? no        Video-Visit Details    Type of service:  Video Visit    Video End Time (time video stopped): 8:56 AM  Originating Location (pt. Location): Home    Distant Location (provider location):  [facility] [place]     Platform used for Video Visit: [employer]

## 2022-08-31 ENCOUNTER — HOSPITAL ENCOUNTER (OUTPATIENT)
Facility: AMBULATORY SURGERY CENTER | Age: 37
Discharge: HOME | End: 2022-08-31
Attending: SURGERY
Payer: MEDICARE

## 2022-08-31 ENCOUNTER — TELEPHONE (OUTPATIENT)
Dept: SURGERY | Facility: CLINIC | Age: 37
End: 2022-08-31

## 2022-08-31 NOTE — TELEPHONE ENCOUNTER
"Received a call from Mela at the [facility] that patient's surgery was canceled because patient did not have someone to stay with her for 24 hours.  Patient called me to discuss the policy. She wanted to know if she could stay overnight after the surgery so staff would be with her 24 hours. I told her that was not possible for a same day laparoscopic umbilical hernia repair.   She said \"What do immigrants do that have no one?\"   I asked if she had a neighbor or a friend, family member that could be with her and she said no. I asked if she had a [employer] friend and she said she can't drive so doesn't go to [employer]. I said she could call the hospital and ask to speak to a  but she said she would call her \"worker\" to see if she had any ideas.   "

## 2022-09-06 ENCOUNTER — TELEPHONE (OUTPATIENT)
Dept: FAMILY MEDICINE | Facility: CLINIC | Age: 37
End: 2022-09-06

## 2022-09-06 ENCOUNTER — TELEPHONE (OUTPATIENT)
Dept: BEHAVIORAL HEALTH | Facility: CLINIC | Age: 37
End: 2022-09-06

## 2022-09-06 NOTE — TELEPHONE ENCOUNTER
Patient called stating that she feel like she has ptsd. She  Has anxiety and depression.     Transferred to [facility]  [facility]

## 2022-09-06 NOTE — TELEPHONE ENCOUNTER
Attempted to call the patient but get a recording stating patient is not able to receive calls right now. Nicci PONCE RN

## 2022-09-06 NOTE — TELEPHONE ENCOUNTER
RN spoke with patient and assisted with scheduling visit later this week. She already has appt with psych tomorrow and will cancel with PCP Thursday if her concerns are addressed tomorrow.     Chiquis Roe RN  [facility]

## 2022-09-06 NOTE — TELEPHONE ENCOUNTER
Pt is a(n) Age Range: Adult (18+ out of high school)  seeking an Eval for Assessment type: MH  Pt interested in program type: MH/Dual program  Pt DID/NOT: Did schedule appt themselves.     If pt is self pay, we must complete a Cost Estimate and save it to the pt chart.   (Do not run cost estimate if pt is not calling for the appt themselves - send for bens)    Cost estimate  DID/NOT: Did not get completed.     Jay Jay Field

## 2022-09-07 ASSESSMENT — ANXIETY QUESTIONNAIRES
GAD7 TOTAL SCORE: 21
3. WORRYING TOO MUCH ABOUT DIFFERENT THINGS: NEARLY EVERY DAY
7. FEELING AFRAID AS IF SOMETHING AWFUL MIGHT HAPPEN: NEARLY EVERY DAY
6. BECOMING EASILY ANNOYED OR IRRITABLE: NEARLY EVERY DAY
GAD7 TOTAL SCORE: 21
1. FEELING NERVOUS, ANXIOUS, OR ON EDGE: NEARLY EVERY DAY
5. BEING SO RESTLESS THAT IT IS HARD TO SIT STILL: NEARLY EVERY DAY
2. NOT BEING ABLE TO STOP OR CONTROL WORRYING: NEARLY EVERY DAY
GAD7 TOTAL SCORE: 21
4. TROUBLE RELAXING: NEARLY EVERY DAY
7. FEELING AFRAID AS IF SOMETHING AWFUL MIGHT HAPPEN: NEARLY EVERY DAY

## 2022-09-07 ASSESSMENT — PATIENT HEALTH QUESTIONNAIRE - PHQ9
SUM OF ALL RESPONSES TO PHQ QUESTIONS 1-9: 24
SUM OF ALL RESPONSES TO PHQ QUESTIONS 1-9: 24

## 2022-09-08 ENCOUNTER — HOSPITAL ENCOUNTER (OUTPATIENT)
Dept: BEHAVIORAL HEALTH | Facility: CLINIC | Age: 37
Discharge: HOME | End: 2022-09-08
Attending: FAMILY MEDICINE | Admitting: FAMILY MEDICINE
Payer: MEDICARE

## 2022-09-08 ENCOUNTER — MEDICAL CORRESPONDENCE (OUTPATIENT)
Dept: FAMILY MEDICINE | Facility: CLINIC | Age: 37
End: 2022-09-08

## 2022-09-08 ENCOUNTER — VIRTUAL VISIT (OUTPATIENT)
Dept: FAMILY MEDICINE | Facility: CLINIC | Age: 37
End: 2022-09-08
Payer: MEDICARE

## 2022-09-08 ENCOUNTER — TELEPHONE (OUTPATIENT)
Dept: BEHAVIORAL HEALTH | Facility: CLINIC | Age: 37
End: 2022-09-08

## 2022-09-08 DIAGNOSIS — M54.41 CHRONIC RIGHT-SIDED LOW BACK PAIN WITH RIGHT-SIDED SCIATICA: Primary | ICD-10-CM

## 2022-09-08 DIAGNOSIS — G89.29 CHRONIC RIGHT-SIDED LOW BACK PAIN WITH RIGHT-SIDED SCIATICA: Primary | ICD-10-CM

## 2022-09-08 PROCEDURE — 90791 PSYCH DIAGNOSTIC EVALUATION: CPT | Mod: GT | Performed by: COUNSELOR

## 2022-09-08 PROCEDURE — 99213 OFFICE O/P EST LOW 20 MIN: CPT | Mod: 95 | Performed by: NURSE PRACTITIONER

## 2022-09-08 ASSESSMENT — COLUMBIA-SUICIDE SEVERITY RATING SCALE - C-SSRS
5. HAVE YOU STARTED TO WORK OUT OR WORKED OUT THE DETAILS OF HOW TO KILL YOURSELF? DO YOU INTEND TO CARRY OUT THIS PLAN?: NO
3. HAVE YOU BEEN THINKING ABOUT HOW YOU MIGHT KILL YOURSELF?: NO
6. HAVE YOU EVER DONE ANYTHING, STARTED TO DO ANYTHING, OR PREPARED TO DO ANYTHING TO END YOUR LIFE?: YES
1. IN THE PAST MONTH, HAVE YOU WISHED YOU WERE DEAD OR WISHED YOU COULD GO TO SLEEP AND NOT WAKE UP?: NO
4. HAVE YOU HAD THESE THOUGHTS AND HAD SOME INTENTION OF ACTING ON THEM?: NO
2. HAVE YOU ACTUALLY HAD ANY THOUGHTS OF KILLING YOURSELF IN THE PAST MONTH?: NO

## 2022-09-08 NOTE — PROGRESS NOTES
Maddy is a 36 year old who is being evaluated via a billable video visit.      How would you like to obtain your AVS? MyChart  If the video visit is dropped, the invitation should be resent by: Text to cell phone: [phone]  Will anyone else be joining your video visit? No          Assessment & Plan     Chronic right-sided low back pain with right-sided sciatica  Encouraged patient to continue with PT  If no improvement over the next month with PT, patient should follow up with me in clinic.      The risks, benefits and treatment options of prescribed medications or other treatments have been discussed with the patient. The patient verbalized their understanding and should call or follow up if no improvement or if they develop further problems.    PHILL [facility]              Subjective   Maddy is a 36 year old, presenting for the following health issues:  No chief complaint on file.      HPI     Back pain:  Right sided back pain - radiates into the right leg.  For years - gotten worse over time.    No change to the pain recently    Has been seeing a chiropractor  Physical therapy x1 8/22/2022                Review of Systems   Constitutional, HEENT, cardiovascular, pulmonary, gi and gu systems are negative, except as otherwise noted.      Objective           Vitals:  No vitals were obtained today due to virtual visit.    Physical Exam   GENERAL: Healthy, alert and no distress  EYES: Eyes grossly normal to inspection.  No discharge or erythema, or obvious scleral/conjunctival abnormalities.  RESP: No audible wheeze, cough, or visible cyanosis.  No visible retractions or increased work of breathing.    SKIN: Visible skin clear. No significant rash, abnormal pigmentation or lesions.  NEURO: Cranial nerves grossly intact.  Mentation and speech appropriate for age.  PSYCH: Mentation appears normal, affect normal/bright, judgement and insight intact, normal speech and  appearance well-groomed.                Video-Visit Details    Video Start Time: 1:36 PM    Type of service:  Video Visit    Video End Time:1:44 PM    Originating Location (pt. Location): Home    Distant Location (provider location):  [facility]     Platform used for Video Visit: [employer]

## 2022-09-08 NOTE — TELEPHONE ENCOUNTER
A second phone call was made out to patient to check them in for their virtual appointment today. Writer was unable to get a hold of patient to check them in for this appointment, call comes back with a beeping tone.

## 2022-09-08 NOTE — TELEPHONE ENCOUNTER
Patient have a video appointment today at 12pm with [facility]. Writer placed a call this morning to check in patient. Patient's phone line: [phone] went to a dial tone. There is no voicemail box system. Writer unable to leave a voicemail behind.
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
## 2022-09-09 ENCOUNTER — MYC MEDICAL ADVICE (OUTPATIENT)
Dept: ALLERGY | Facility: CLINIC | Age: 37
End: 2022-09-09

## 2022-09-09 DIAGNOSIS — R06.02 SHORTNESS OF BREATH: Primary | ICD-10-CM

## 2022-09-09 DIAGNOSIS — J30.1 SEASONAL ALLERGIC RHINITIS DUE TO POLLEN: ICD-10-CM

## 2022-09-09 NOTE — ADDENDUM NOTE
Encounter addended by: Nikki Barfield [facility] on: 9/9/2022 2:17 PM   Actions taken: Clinical Note Signed

## 2022-09-12 RX ORDER — FLUTICASONE PROPIONATE 50 MCG
2 SPRAY, SUSPENSION (ML) NASAL DAILY
Qty: 15.8 ML | Refills: 1 | Status: SHIPPED | OUTPATIENT
Start: 2022-09-12 | End: 2022-10-05

## 2022-09-12 RX ORDER — INHALER, ASSIST DEVICES
SPACER (EA) MISCELLANEOUS
Qty: 1 EACH | Refills: 0 | Status: SHIPPED | OUTPATIENT
Start: 2022-09-12 | End: 2022-10-10

## 2022-09-12 NOTE — TELEPHONE ENCOUNTER
Please see My Chart communication. Flonase last prescribed by Mary Reeves NP.     Cued up if appropriate.    Callie VELASQUEZ RN  Specialty/Allergy Clinics

## 2022-09-15 ENCOUNTER — VIRTUAL VISIT (OUTPATIENT)
Dept: EDUCATION SERVICES | Facility: CLINIC | Age: 37
End: 2022-09-15
Payer: MEDICARE

## 2022-09-15 DIAGNOSIS — E11.9 TYPE 2 DIABETES MELLITUS WITHOUT COMPLICATION, WITHOUT LONG-TERM CURRENT USE OF INSULIN (H): Primary | ICD-10-CM

## 2022-09-15 PROCEDURE — 98967 PH1 ASSMT&MGMT NQHP 11-20: CPT | Mod: 95 | Performed by: DIETITIAN, REGISTERED

## 2022-09-15 NOTE — PROGRESS NOTES
Diabetes Education Follow-up    Subjective/Objective:  Type of service:  Video Visit  Originating Location (pt. Location): Home  Distant Location (provider location): Home  Mode of Communication:  Video Conference via [employer]  Video Start Time: 735  Video End Time (time video stopped): 746      Diabetes is being managed with   Lifestyle (diet/activity), Diabetes Medications   Diabetes Medication(s)     Incretin Mimetic Agents (GLP-1 Receptor Agonists)       liraglutide (VICTOZA) 18 MG/3ML solution    Inject 1.8 mg Subcutaneous daily          BG/Food Log:   Has been very busy and not checking much, but was at 100mg/dL a week or two ago.   Goal - when busy still try to check fasting or pre meal twice weekly.  Try to add post meals or additional checks on slower weeks    Assessment:  Maddy says she is doing really wel with the higher dose of Victoza.  Feels the satiety impact, but no distressing GI symptoms.  Overall her stomach has been doing well.  Weight was down from July to August, likely with the Victoza. Overall reports to be feeling really well.  No diet or activity questions.  Has been increasing walking lately.    Reviewed PCP follow up and labs.   In the future could re-try  extended release metformin at 500mg daily (had discontinued earlier this year due to GI issues, but not sure if related to metformin).  It would be worth the experiment of reintroducing this mediation to see if it can be used in the future, but no rush to do this now as blood sugars are well controlled.  Consider after next A1c.     Plan/Response:  Has an A1c ordered - do early October.  Rudy Watson and see if she wants other labs added   Set follow up in November for general check in       Anna Hampton RD, LD, CDCES  Diabetes Education  Time spent: 11 minutes

## 2022-09-21 ENCOUNTER — TRANSFERRED RECORDS (OUTPATIENT)
Dept: HEALTH INFORMATION MANAGEMENT | Facility: CLINIC | Age: 37
End: 2022-09-21

## 2022-09-29 ENCOUNTER — MYC MEDICAL ADVICE (OUTPATIENT)
Dept: FAMILY MEDICINE | Facility: CLINIC | Age: 37
End: 2022-09-29

## 2022-09-29 NOTE — PROGRESS NOTES
Speech Therapy Discharge Note     S - Patient was seen for ST evaluation and recommended for treatment program. Patient did not schedule follow up treatment visits. Current status unknown.      O/A - Last visit entry in [facility] will serve as objective information for discharge.      P - DC speech therapy due to Patient did not schedule

## 2022-09-29 NOTE — TELEPHONE ENCOUNTER
Please inform patient that we do not start colon cancer screening until age 45.  We do not start breast cancer screening until age 40.    I have filled out the form for her Pap smear.  Shirley Freeman, CNP

## 2022-09-30 ENCOUNTER — OFFICE VISIT (OUTPATIENT)
Dept: OBGYN | Facility: CLINIC | Age: 37
End: 2022-09-30
Payer: MEDICARE

## 2022-09-30 VITALS
TEMPERATURE: 97.9 F | DIASTOLIC BLOOD PRESSURE: 85 MMHG | BODY MASS INDEX: 42.95 KG/M2 | WEIGHT: 242.4 LBS | HEIGHT: 63 IN | HEART RATE: 79 BPM | RESPIRATION RATE: 16 BRPM | SYSTOLIC BLOOD PRESSURE: 121 MMHG

## 2022-09-30 DIAGNOSIS — Z30.430 ENCOUNTER FOR IUD INSERTION: Primary | ICD-10-CM

## 2022-09-30 DIAGNOSIS — Z30.430 ENCOUNTER FOR INSERTION OF INTRAUTERINE CONTRACEPTIVE DEVICE: ICD-10-CM

## 2022-09-30 DIAGNOSIS — Z30.430 ENCOUNTER FOR INSERTION OF MIRENA IUD: ICD-10-CM

## 2022-09-30 LAB
CLUE CELLS: ABNORMAL
HCG UR QL: NEGATIVE
INTERNAL QC OK POCT: NORMAL
POCT KIT EXPIRATION DATE: NORMAL
POCT KIT LOT NUMBER: NORMAL
TRICHOMONAS, WET PREP: ABNORMAL
WBC'S/HIGH POWER FIELD, WET PREP: ABNORMAL
YEAST, WET PREP: ABNORMAL

## 2022-09-30 PROCEDURE — 87491 CHLMYD TRACH DNA AMP PROBE: CPT | Performed by: OBSTETRICS & GYNECOLOGY

## 2022-09-30 PROCEDURE — 87591 N.GONORRHOEAE DNA AMP PROB: CPT | Performed by: OBSTETRICS & GYNECOLOGY

## 2022-09-30 PROCEDURE — 58300 INSERT INTRAUTERINE DEVICE: CPT | Performed by: OBSTETRICS & GYNECOLOGY

## 2022-09-30 PROCEDURE — 81025 URINE PREGNANCY TEST: CPT | Performed by: OBSTETRICS & GYNECOLOGY

## 2022-09-30 PROCEDURE — 87210 SMEAR WET MOUNT SALINE/INK: CPT | Performed by: OBSTETRICS & GYNECOLOGY

## 2022-09-30 NOTE — RESULT ENCOUNTER NOTE
The attached results were normal. Please follow any recommendations discussed in clinic.    Pam Cloud MD          9/30/2022 2:38 PM

## 2022-09-30 NOTE — PROGRESS NOTES
"36 year old year old   female here for IUD insertion, pt aware of birth control options and wants the Mirena IUD.  She had an IUD placed in April.  She thinks it fell out in a blood clot a few months ago.  She had a hernia repair and had an X-ray and CT scan and they didn't mention seeing it.  She has had an IUD before this and did well with it. She hasn't had intercourse in over a month.  She is also sleepy.  She had a sleep study last night and couldn't sleep and had a medication adjustment that she thinks is making her sleepy.  She has a ride home and doesn't want to reschedule.    Discussed risks and benefits including failure rates, infection, expulsion, perforation, possible need for surgical removal,  irregular bleeding and cramping. The pamphlet was reviewed and signed.    O: /85 (BP Location: Right arm, Patient Position: Sitting, Cuff Size: Adult Large)   Pulse 79   Temp 97.9  F (36.6  C)   Resp 16   Ht 1.6 m (5' 3\")   Wt 110 kg (242 lb 6.4 oz)   LMP  (LMP Unknown)   BMI 42.94 kg/m       UPT: neg  GC/CHlam: done today    The patient was placed in the dorsal lithotomy position. A speculum was placed in vagina. The cervix was visualized.  The cervix was cleansed with betadyne x 3. A single tooth tenaculum was placed on anterior lip of cervix.  The internal os was somewhat stenotic at 6 cm length.  The os finder was used to dilate the cervix slightly. The uterus sounded to 11 cm.  Mirena IUD insertion device inserted easily into uterus, deployed and the strings released and insertion devise removed.  The strings were trimmed to 3 cm outside of cervix. The tenaculum was removed and hemostasis was obtained with silver nitrate.  The patient was instructed on string checks.    A:  Mirena IUD insertion.  P: The patient is to check monthly for strings.  If unable to palpate, should call for an appointment.  I recommended ultrasound for placement due to rapid expulsion with the last IUD and due to " slightly more difficult insertion today.  Ultrasound discussed and ordered.  Return to clinic 1 month or prn.    Pam Cloud MD on 9/30/2022 at 1:38 PM

## 2022-10-01 LAB
C TRACH DNA SPEC QL PROBE+SIG AMP: NEGATIVE
N GONORRHOEA DNA SPEC QL NAA+PROBE: NEGATIVE

## 2022-10-03 DIAGNOSIS — J30.1 SEASONAL ALLERGIC RHINITIS DUE TO POLLEN: ICD-10-CM

## 2022-10-03 DIAGNOSIS — R06.02 SHORTNESS OF BREATH: ICD-10-CM

## 2022-10-04 DIAGNOSIS — J30.1 SEASONAL ALLERGIC RHINITIS DUE TO POLLEN: ICD-10-CM

## 2022-10-04 RX ORDER — AZELASTINE 1 MG/ML
2 SPRAY, METERED NASAL 2 TIMES DAILY PRN
Qty: 30 ML | Refills: 3 | Status: SHIPPED | OUTPATIENT
Start: 2022-10-04 | End: 2023-07-30

## 2022-10-04 NOTE — TELEPHONE ENCOUNTER
Requested Prescriptions   Pending Prescriptions Disp Refills     fluticasone (FLONASE) 50 MCG/ACT nasal spray 15.8 mL 1     Sig: Spray 2 sprays into both nostrils daily       There is no refill protocol information for this order        Last office visit: 7/22/2022 with prescribing provider:  Dr. Shin    Future Office Visit:   Next 5 appointments (look out 90 days)    Oct 14, 2022  8:00 AM  (Arrive by 7:40 AM)  Provider Visit with PHILL Luo CNP  [facility] ([facility] )  Arrive at: Clinic A [address] [address]  [phone]   Oct 28, 2022 10:00 AM  Return Visit with Rudy Shin MD  [facility] ([facility] ) [address] [address]  [phone]               [facility]  Special[facility] Clinic [facility]

## 2022-10-04 NOTE — RESULT ENCOUNTER NOTE
The attached results were normal. Please follow any recommendations discussed in clinic.    Pam Cloud MD          10/4/2022 8:32 AM

## 2022-10-04 NOTE — TELEPHONE ENCOUNTER
Signed Prescriptions:                        Disp   Refills    azelastine (ASTELIN) 0.1 % nasal spray     30 mL  3        Sig: Spray 2 sprays into both nostrils 2 times daily as           needed for rhinitis  Authorizing Provider: EDGARDO STEELE  Ordering User: SAMSON SANDHU RN refilled medication per [facility] Refill Protocol.     Samson Sandhu RN

## 2022-10-05 ENCOUNTER — MYC MEDICAL ADVICE (OUTPATIENT)
Dept: FAMILY MEDICINE | Facility: CLINIC | Age: 37
End: 2022-10-05

## 2022-10-05 RX ORDER — BUDESONIDE AND FORMOTEROL FUMARATE DIHYDRATE 80; 4.5 UG/1; UG/1
2 AEROSOL RESPIRATORY (INHALATION) 2 TIMES DAILY
Qty: 10.2 G | Refills: 0 | Status: SHIPPED | OUTPATIENT
Start: 2022-10-05 | End: 2022-10-28

## 2022-10-05 RX ORDER — FLUTICASONE PROPIONATE 50 MCG
2 SPRAY, SUSPENSION (ML) NASAL DAILY
Qty: 15.8 ML | Refills: 0 | Status: SHIPPED | OUTPATIENT
Start: 2022-10-05 | End: 2022-10-28

## 2022-10-05 NOTE — TELEPHONE ENCOUNTER
Spoke to patient she will come to her appointment and talk to her provider about the A1c kit she received. Simona Gibson on 10/5/2022 at 11:44 AM

## 2022-10-05 NOTE — TELEPHONE ENCOUNTER
Prescription approved per [facility] Refill Protocol.  1 refill provided, patient can request additional refills at appointment on 10/28 with Dr. Aleida SEPULVEDA  RN  Specialty/Allergy Clinic

## 2022-10-05 NOTE — TELEPHONE ENCOUNTER
Signed Prescriptions:                        Disp   Refills    budesonide-formoterol (SYMBICORT) 80-4.5 M*10.2 g 0        Sig: Inhale 2 puffs into the lungs 2 times daily  Authorizing Provider: EDGARDO STEELE  Ordering User: SAMSON SANDHU RN refilled medication per [facility] Refill Protocol.     Samson Sandhu RN

## 2022-10-10 ENCOUNTER — HOSPITAL ENCOUNTER (OUTPATIENT)
Dept: ULTRASOUND IMAGING | Facility: CLINIC | Age: 37
Discharge: HOME | End: 2022-10-10
Attending: OBSTETRICS & GYNECOLOGY | Admitting: OBSTETRICS & GYNECOLOGY
Payer: MEDICARE

## 2022-10-10 DIAGNOSIS — R06.02 SHORTNESS OF BREATH: ICD-10-CM

## 2022-10-10 DIAGNOSIS — Z30.430 ENCOUNTER FOR INSERTION OF INTRAUTERINE CONTRACEPTIVE DEVICE: ICD-10-CM

## 2022-10-10 PROCEDURE — 76830 TRANSVAGINAL US NON-OB: CPT

## 2022-10-10 RX ORDER — INHALER, ASSIST DEVICES
SPACER (EA) MISCELLANEOUS
Qty: 1 EACH | Refills: 0 | Status: SHIPPED | OUTPATIENT
Start: 2022-10-10

## 2022-10-10 NOTE — TELEPHONE ENCOUNTER
Requested Prescriptions   Pending Prescriptions Disp Refills     spacer (OPTICHAMBER APOLINAR) holding chamber 1 each 0     Sig: Use with Symbicort and albuterol inhalers as prescribed       There is no refill protocol information for this order        Last office visit: 7/22/2022 with prescribing provider:  Dr. Shin    Future Office Visit:   Next 5 appointments (look out 90 days)    Oct 14, 2022  8:00 AM  (Arrive by 7:40 AM)  Provider Visit with PHILL Luo CNP  [facility] ([facility] )  Arrive at: Clinic A [address] [place] [address]  [phone]   Oct 28, 2022 10:00 AM  Return Visit with Rudy Shin MD  [facility] ([facility] ) [address] [place] [address]  [phone]               [facility]  Specialty Clinic [facility]

## 2022-10-10 NOTE — TELEPHONE ENCOUNTER
Prescription approved per [facility] Refill Protocol.    Callie VELASQUEZ RN  Specialty/Allergy Clinics

## 2022-10-14 ENCOUNTER — OFFICE VISIT (OUTPATIENT)
Dept: FAMILY MEDICINE | Facility: CLINIC | Age: 37
End: 2022-10-14
Payer: MEDICARE

## 2022-10-14 VITALS
TEMPERATURE: 98.6 F | BODY MASS INDEX: 40.75 KG/M2 | DIASTOLIC BLOOD PRESSURE: 72 MMHG | OXYGEN SATURATION: 96 % | HEIGHT: 63 IN | HEART RATE: 90 BPM | WEIGHT: 230 LBS | SYSTOLIC BLOOD PRESSURE: 110 MMHG | RESPIRATION RATE: 16 BRPM

## 2022-10-14 DIAGNOSIS — Z23 HIGH PRIORITY FOR 2019-NCOV VACCINE: ICD-10-CM

## 2022-10-14 DIAGNOSIS — Z00.00 ENCOUNTER FOR MEDICARE ANNUAL WELLNESS EXAM: Primary | ICD-10-CM

## 2022-10-14 DIAGNOSIS — E11.9 TYPE 2 DIABETES MELLITUS WITHOUT COMPLICATION, WITHOUT LONG-TERM CURRENT USE OF INSULIN (H): ICD-10-CM

## 2022-10-14 DIAGNOSIS — Z23 NEED FOR PROPHYLACTIC VACCINATION AND INOCULATION AGAINST INFLUENZA: ICD-10-CM

## 2022-10-14 DIAGNOSIS — E03.9 ACQUIRED HYPOTHYROIDISM: ICD-10-CM

## 2022-10-14 LAB
HBA1C MFR BLD: 5.4 % (ref 0–5.6)
TSH SERPL DL<=0.005 MIU/L-ACNC: 1.7 UIU/ML (ref 0.3–4.2)

## 2022-10-14 PROCEDURE — 83036 HEMOGLOBIN GLYCOSYLATED A1C: CPT | Performed by: NURSE PRACTITIONER

## 2022-10-14 PROCEDURE — 91312 COVID-19,PF,PFIZER BOOSTER BIVALENT: CPT | Performed by: NURSE PRACTITIONER

## 2022-10-14 PROCEDURE — G0439 PPPS, SUBSEQ VISIT: HCPCS | Performed by: NURSE PRACTITIONER

## 2022-10-14 PROCEDURE — 99213 OFFICE O/P EST LOW 20 MIN: CPT | Mod: 25 | Performed by: NURSE PRACTITIONER

## 2022-10-14 PROCEDURE — 0124A COVID-19,PF,PFIZER BOOSTER BIVALENT: CPT | Performed by: NURSE PRACTITIONER

## 2022-10-14 PROCEDURE — 90686 IIV4 VACC NO PRSV 0.5 ML IM: CPT | Performed by: NURSE PRACTITIONER

## 2022-10-14 PROCEDURE — 84443 ASSAY THYROID STIM HORMONE: CPT | Performed by: NURSE PRACTITIONER

## 2022-10-14 PROCEDURE — G0008 ADMIN INFLUENZA VIRUS VAC: HCPCS | Performed by: NURSE PRACTITIONER

## 2022-10-14 PROCEDURE — 36415 COLL VENOUS BLD VENIPUNCTURE: CPT | Performed by: NURSE PRACTITIONER

## 2022-10-14 RX ORDER — LEVOTHYROXINE SODIUM 50 UG/1
50 TABLET ORAL
Qty: 90 TABLET | Refills: 3 | Status: SHIPPED | OUTPATIENT
Start: 2022-10-14 | End: 2023-11-20

## 2022-10-14 RX ORDER — FLUTICASONE PROPIONATE 50 MCG
2 SPRAY, SUSPENSION (ML) NASAL DAILY
Qty: 15.8 ML | Refills: 0 | Status: CANCELLED | OUTPATIENT
Start: 2022-10-14

## 2022-10-14 RX ORDER — BUDESONIDE AND FORMOTEROL FUMARATE DIHYDRATE 80; 4.5 UG/1; UG/1
2 AEROSOL RESPIRATORY (INHALATION) 2 TIMES DAILY
Qty: 10.2 G | Refills: 0 | Status: CANCELLED | OUTPATIENT
Start: 2022-10-14

## 2022-10-14 RX ORDER — OLANZAPINE 5 MG/1
5 TABLET ORAL AT BEDTIME
COMMUNITY
Start: 2022-10-03 | End: 2023-02-16

## 2022-10-14 RX ORDER — TRAZODONE HYDROCHLORIDE 50 MG/1
TABLET, FILM COATED ORAL
COMMUNITY
Start: 2022-08-28 | End: 2023-02-16

## 2022-10-14 ASSESSMENT — PAIN SCALES - GENERAL: PAINLEVEL: NO PAIN (0)

## 2022-10-14 NOTE — LETTER
October 17, 2022      Maddy Ortiz  [address]  [place] [address]        Dear ,    We are writing to inform you of your test results.    Your A1c is very good at 5.4%  Your thyroid is normal.   Continue current dose of levothyroxine.     Resulted Orders   HEMOGLOBIN A1C   Result Value Ref Range    Hemoglobin A1C 5.4 0.0 - 5.6 %      Comment:      Normal <5.7%   Prediabetes 5.7-6.4%    Diabetes 6.5% or higher     Note: Adopted from ADA consensus guidelines.   TSH   Result Value Ref Range    TSH 1.70 0.30 - 4.20 uIU/mL       If you have any questions or concerns, please call the clinic at the number listed above.       Sincerely,      PHILL Luo CNP

## 2022-10-14 NOTE — LETTER
October 17, 2022      Maddy Ortiz  [address] [facility]  [place] [address]        Dear ,    We are writing to inform you of your test results.    Your thyroid is normal.  Continue current dose of levothyroxine.  Your A1c is very good at 5.4%    Resulted Orders   HEMOGLOBIN A1C   Result Value Ref Range    Hemoglobin A1C 5.4 0.0 - 5.6 %      Comment:      Normal <5.7%   Prediabetes 5.7-6.4%    Diabetes 6.5% or higher     Note: Adopted from ADA consensus guidelines.       If you have any questions or concerns, please call the clinic at the number listed above.       Sincerely,      PHILL Luo CNP

## 2022-10-14 NOTE — PROGRESS NOTES
Assessment & Plan     Encounter for Medicare annual wellness exam      Type 2 diabetes mellitus without complication, without long-term current use of insulin (H)  Due for labs today.  Home blood sugars are well controlled.  Continue Victoza   Follow up in 6 months.  - HEMOGLOBIN A1C; Future  - OFFICE/OUTPT VISIT,ESTLEVL III  - HEMOGLOBIN A1C    Acquired hypothyroidism  Asymptomatic  Recheck TSH today  - levothyroxine (SYNTHROID/LEVOTHROID) 50 MCG tablet; Take 1 tablet (50 mcg) by mouth daily before breakfast  - TSH; Future  - OFFICE/OUTPT VISIT,ESTLEVL III  - TSH    Need for prophylactic vaccination and inoculation against influenza  High priority for 2019-nCoV vaccine      Return in about 53 weeks (around 10/20/2023) for Annual Wellness Visit.    The risks, benefits and treatment options of prescribed medications or other treatments have been discussed with the patient. The patient verbalized their understanding and should call or follow up if no improvement or if they develop further problems.    [facility]          Terry Castañeda is a 36 year old, presenting for the following health issues:  Diabetes, Refill Request (Due for some refills -  allergy meds), Imm/Inj (Flu Shot - COVID-19 VACCINE), and Physical (AMW)      History of Present Illness       Diabetes:   She presents for follow up of diabetes.  She is checking home blood glucose a few times a month. She checks blood glucose before meals.  Blood glucose is never over 200 and never under 70. She is aware of hypoglycemia symptoms including shakiness, dizziness and confusion. She has no concerns regarding her diabetes at this time.  She is having excessive thirst and weight loss.         She eats 2-3 servings of fruits and vegetables daily.She consumes 0 sweetened beverage(s) daily.She exercises with enough effort to increase her heart rate 9 or less minutes per day.  She exercises with enough effort  "to increase her heart rate 3 or less days per week.   She is taking medications regularly.     Wt Readings from Last 4 Encounters:   10/14/22 104.3 kg (230 lb)   09/30/22 110 kg (242 lb 6.4 oz)   08/30/22 107 kg (236 lb)   08/25/22 107 kg (236 lb)       Hypothyroidism Follow-up      Since last visit, patient describes the following symptoms: Weight stable, no hair loss, no skin changes, no constipation, no loose stools      Annual Wellness Visit  Patient has been advised of split billing requirements and indicates understanding: Yes     Are you in the first 12 months of your Medicare Part B coverage?  No    Physical Health:    In general, how would you rate your overall physical health? good    Outside of work, how many days during the week do you exercise?2-3 days/week    Outside of work, approximately how many minutes a day do you exercise?15-30 minutes    If you drink alcohol do you typically have >3 drinks per day or >7 drinks per week? No    Do you usually eat at least 4 servings of fruit and vegetables a day, include whole grains & fiber and avoid regularly eating high fat or \"junk\" foods? NO    Do you have any problems taking medications regularly? No    Do you have any side effects from medications? none    Needs assistance for the following daily activities: transportation    Which of the following safety concerns are present in your home?  none identified     Hearing impairment: No    In the past 6 months, have you been bothered by leaking of urine? no    Mental Health:    In general, how would you rate your overall mental or emotional health? good  PHQ-2 Score:      Do you feel safe in your environment? Yes    Have you ever done Advance Care Planning? (For example, a Health Directive, POLST, or a discussion with a medical provider or your loved ones about your wishes)? No, advance care planning information given to patient to review.  Advanced care planning was discussed at today's visit.    Fall risk: no " "identified risks       Cognitive Screening: Not appropriate due to mental handicap    Do you have sleep apnea, excessive snoring or daytime drowsiness?: no    Current providers sharing in care for this patient include:   Patient Care Team:  Shirley Freeman APRN CNP as PCP - General (Nurse Practitioner)  Christie as [facility] worker  Eileen Stone  as  (Licensed Mental Health)  [facility] as Occupational Therapist (Licensed Mental Health)  Aggie Lehman MD as MD (INTERNAL MEDICINE - ENDOCRINOLOGY, DIABETES & METABOLISM)  Shirley Freeman APRN CNP as Assigned PCP  Alexander Montemayor MD as MD (Psychiatry)  [facility] as Therapist (Licensed Mental Health)  Rudy Shin MD as Assigned Allergy Provider  Rachid Garza DPM as Assigned Musculoskeletal Provider  Anna Hampton RD as Diabetes Educator (Dietitian, Registered)  Krishna Olivia MD as Assigned Neuroscience Provider  Maddison Bear MD as Assigned OBGYN Provider  Elías Montero MD as Surgeon (Surgery)  Elías Montero MD as Assigned Surgical Provider    Patient has been advised of split billing requirements and indicates understanding: Yes      Review of Systems   Constitutional, HEENT, cardiovascular, pulmonary, gi and gu systems are negative, except as otherwise noted.      Objective    /72 (BP Location: Right arm, Cuff Size: Adult Large)   Pulse 90   Temp 98.6  F (37  C) (Tympanic)   Resp 16   Ht 1.6 m (5' 3\")   Wt 104.3 kg (230 lb)   LMP  (LMP Unknown)   SpO2 96%   BMI 40.74 kg/m    Body mass index is 40.74 kg/m .  Physical Exam   GENERAL: healthy, alert and no distress  HENT: ear canals and TM's normal, nose and mouth without ulcers or lesions  NECK: no adenopathy, no asymmetry, masses, or scars and thyroid normal to palpation  RESP: lungs clear to auscultation - no rales, rhonchi or wheezes  CV: regular rate and rhythm, normal S1 S2, no S3 or S4, " no murmur, click or rub, no peripheral edema and peripheral pulses strong  MS: no gross musculoskeletal defects noted, no edema  Diabetic foot exam: normal DP and PT pulses, no trophic changes or ulcerative lesions, normal sensory exam and normal monofilament exam

## 2022-10-14 NOTE — PATIENT INSTRUCTIONS
Patient Education   Personalized Prevention Plan  You are due for the preventive services outlined below.  Your care team is available to assist you in scheduling these services.  If you have already completed any of these items, please share that information with your care team to update in your medical record.  Health Maintenance Due   Topic Date Due     Diabetic Foot Exam  Never done     Pneumococcal Vaccine (1 - PCV) Never done     Hepatitis B Vaccine (2 of 3 - 3-dose series) 03/16/1998     A1C Lab  09/30/2022

## 2022-10-28 ENCOUNTER — OFFICE VISIT (OUTPATIENT)
Dept: ALLERGY | Facility: CLINIC | Age: 37
End: 2022-10-28
Payer: MEDICARE

## 2022-10-28 VITALS
WEIGHT: 233 LBS | BODY MASS INDEX: 41.29 KG/M2 | HEIGHT: 63 IN | OXYGEN SATURATION: 93 % | HEART RATE: 87 BPM | SYSTOLIC BLOOD PRESSURE: 113 MMHG | DIASTOLIC BLOOD PRESSURE: 82 MMHG

## 2022-10-28 DIAGNOSIS — J30.89 ALLERGIC RHINITIS CAUSED BY MOLD: ICD-10-CM

## 2022-10-28 DIAGNOSIS — J30.1 SEASONAL ALLERGIC RHINITIS DUE TO POLLEN: ICD-10-CM

## 2022-10-28 DIAGNOSIS — R06.02 SHORTNESS OF BREATH: Primary | ICD-10-CM

## 2022-10-28 DIAGNOSIS — J30.89 ALLERGIC RHINITIS DUE TO DUST MITE: ICD-10-CM

## 2022-10-28 DIAGNOSIS — J30.81 ALLERGIC RHINITIS DUE TO ANIMALS: ICD-10-CM

## 2022-10-28 PROCEDURE — 99214 OFFICE O/P EST MOD 30 MIN: CPT | Performed by: ALLERGY & IMMUNOLOGY

## 2022-10-28 RX ORDER — FLUTICASONE PROPIONATE 50 MCG
2 SPRAY, SUSPENSION (ML) NASAL DAILY
Qty: 15.8 ML | Refills: 11 | Status: SHIPPED | OUTPATIENT
Start: 2022-10-28 | End: 2023-09-06

## 2022-10-28 RX ORDER — BUDESONIDE AND FORMOTEROL FUMARATE DIHYDRATE 80; 4.5 UG/1; UG/1
2 AEROSOL RESPIRATORY (INHALATION) 2 TIMES DAILY
Qty: 10.2 G | Refills: 11 | Status: SHIPPED | OUTPATIENT
Start: 2022-10-28 | End: 2023-07-30

## 2022-10-28 RX ORDER — CETIRIZINE HYDROCHLORIDE 10 MG/1
10 TABLET ORAL
Qty: 90 TABLET | Refills: 3 | Status: SHIPPED | OUTPATIENT
Start: 2022-10-28 | End: 2023-04-20

## 2022-10-28 ASSESSMENT — ENCOUNTER SYMPTOMS
SORE THROAT: 0
EYE PAIN: 0
SHORTNESS OF BREATH: 0
COUGH: 0
SINUS PRESSURE: 0
SINUS PAIN: 0
RHINORRHEA: 1
WHEEZING: 0
CHEST TIGHTNESS: 0
EYE ITCHING: 0
EYE REDNESS: 0
EYE DISCHARGE: 0

## 2022-10-28 ASSESSMENT — ASTHMA QUESTIONNAIRES: ACT_TOTALSCORE: 22

## 2022-10-28 NOTE — PATIENT INSTRUCTIONS
-Continue with Flonase 1-2 sprays in each nostril once daily.   - Continue azelastine 2 sprays in each nostril twice a day when necessary.    - Continue with cetirizine 10 mg by mouth once daily as needed.     Continue with Symbicort 2 puffs twice daily. Try using it twice daily.     -Continue using albuterol inhaler 2-4 puffs every 4 hours as needed for chest tightness/wheezing/shortness of breath/persistent cough.      Call to schedule breathing test    [phone]    Do not use albuterol on the day of the breathing test if possible.

## 2022-10-28 NOTE — PROGRESS NOTES
SUBJECTIVE:                                                                   Maddy Ortiz presents today to our Allergy Clinic at [facility] for a follow up visit. She is a 36 year old female with allergic rhinitis and episodes of shortness of breath on exertion.       Percutaneous skin puncture testing for aeroallergens performed in November 2016 showed sensitivity to dog, cat, dust mite, molds, pollen of trees, grass, and weeds. In spring-summer 2018, she had a buildup using cluster schedule for allergen immunotherapy.  She reached maintenance dose in July 2018.  One episode of anaphylaxis on September 8, 2020, due to allergen immunotherapy, Red 1:1, 0.5mL   Was given epi x 2.  She tolerated a lower dose x 1 after that. She stopped taking hydroxyzine.  Take cetirizine 10 mg by mouth once daily.  Sometimes, when symptoms are worse, she may take 20 mg by mouth.  She stopped allergen immunotherapy after September 22, 2020.  She has a history of dyspnea on exertion. She was asked several times if albuterol inhaler was helpful or not, and she was not sure. In November, she had PFT, which showed a mild obstructive/restrictive pattern. There was a questionable postbronchodilator partial reversibility.    Uses Symbicort 80/4.5 mcg 2 puffs in the morning all the time, sometimes forgets to use it in the evening. Overall she feels way better on this regimen than before. She rarely needs to use albuterol. Denies nocturnal symptoms. No ED/PCP/urgent care/other specialist visits for asthma flare since the last visit. Doesn't think she was on prednisone in the past.         Most of the time, she takes cetirizine 10 mg by mouth once daily. She uses intranasal fluticasone 2 sprays in each nostril daily and azelastine as needed.    She has rhinorrhea and nasal congestion once a week. Symptoms can vary 3-5/10 in severity. They may last for 1 day, and then she is better. She feels her symptoms are fairly  well controlled.       Patient Active Problem List   Diagnosis     Animal dander allergy     CARDIOVASCULAR SCREENING; LDL GOAL LESS THAN 160     Psychosis (H)     Paranoid type delusional disorder (H)     Bipolar 1 disorder (H)     Insomnia     Depression with anxiety     Seasonal allergic rhinitis due to pollen     Allergic rhinitis due to mold     Allergic rhinitis due to animal dander     Allergic rhinitis due to dust mite     Encounter for IUD insertion     Schizoaffective disorder, bipolar type (H)     Gastroesophageal reflux disease without esophagitis     Paranoia (psychosis) (H)     Morbid obesity (H)     Schizoaffective disorder (H)     Umbilical hernia without obstruction and without gangrene     Fatty liver     Type 2 diabetes mellitus without complication, without long-term current use of insulin (H)     Elevated LFTs     Disorganized behavior     Infection due to 2019 novel coronavirus     Polypharmacy     Sedated due to multiple medications     Overdose, undetermined intent, initial encounter     Segmental and somatic dysfunction     SI (sacroiliac) joint dysfunction     mirena IUD 9/30/22       Past Medical History:   Diagnosis Date     Bipolar 1 disorder (H) 12/6/2012     Depressive disorder      Diabetes mellitus, type 2 (H) 12/13/2021     Paranoid type delusional disorder (H) 11/14/2012      *Patient is Adopted       Problem (# of Occurrences) Relation (Name,Age of Onset)    Unknown/Adopted (3) Mother, Father, Other    Hypertension (1) Sister        Past Surgical History:   Procedure Laterality Date     TONSILLECTOMY & ADENOIDECTOMY      as a child     TONSILLECTOMY & ADENOIDECTOMY       Social History     Socioeconomic History     Marital status: Single     Spouse name: None     Number of children: None     Years of education: None     Highest education level: None   Occupational History     Employer: CURRENTLY UNEMPLOYED AT THIS TIME   Tobacco Use     Smoking status: Former     Packs/day: 0.50      Types: Cigarettes     Smokeless tobacco: Former     Tobacco comments:     around 2nd hand smoke   Vaping Use     Vaping Use: Never used   Substance and Sexual Activity     Alcohol use: Not Currently     Drug use: Not Currently     Sexual activity: Not Currently     Partners: Male     Birth control/protection: Abstinence, None   Other Topics Concern     Parent/sibling w/ CABG, MI or angioplasty before 65F 55M? No   Social History Narrative    One child    Education: some college        October 28, 2022        ENVIRONMENTAL HISTORY: [place] The home is heated with a electric furnace. They do not have central air conditioning, does have box air conditioner in the wall and has air purifier. The patient's bedroom is furnished with stuffed animals in bed, carpeting in bedroom and fabric window coverings. Pets inside the apartment includes 1 cat. There is history of cockroach or mice infestation. There are no smokers in the house.  The apartment does not have a basement.         Alka De Jesus MA           Review of Systems   HENT: Positive for congestion and rhinorrhea. Negative for ear pain, postnasal drip, sinus pressure, sinus pain, sneezing and sore throat.    Eyes: Negative for pain, discharge, redness and itching.   Respiratory: Negative for cough, chest tightness, shortness of breath and wheezing.    Allergic/Immunologic: Positive for environmental allergies.           Current Outpatient Medications:      acetaminophen (TYLENOL) 325 MG tablet, Take 2 tablets (650 mg) by mouth every 4 hours as needed for mild pain (to moderate pain), Disp: , Rfl:      albuterol (PROAIR HFA/PROVENTIL HFA/VENTOLIN HFA) 108 (90 Base) MCG/ACT inhaler, Inhale 2 puffs into the lungs every 4 hours as needed for wheezing or shortness of breath / dyspnea, Disp: 18 g, Rfl: 1     azelastine (ASTELIN) 0.1 % nasal spray, Spray 2 sprays into both nostrils 2 times daily as needed for  rhinitis, Disp: 30 mL, Rfl: 3     blood glucose (NO BRAND SPECIFIED) test strip, Use to test blood sugar 6 times daily or as directed., Disp: 300 strip, Rfl: 11     budesonide-formoterol (SYMBICORT) 80-4.5 MCG/ACT Inhaler, Inhale 2 puffs into the lungs 2 times daily, Disp: 10.2 g, Rfl: 11     cetirizine (ZYRTEC) 10 MG tablet, Take 1 tablet (10 mg) by mouth nightly as needed for allergies or rhinitis, Disp: 90 tablet, Rfl: 3     fluticasone (FLONASE) 50 MCG/ACT nasal spray, Spray 2 sprays into both nostrils daily, Disp: 15.8 mL, Rfl: 11     hydrOXYzine (ATARAX) 50 MG tablet, TAKE 1-2 TABLETS BY MOUTH DAILY AT BEDTIME., Disp: , Rfl:      insulin pen needle (32G X 4 MM) 32G X 4 MM miscellaneous, Use 1 pen needles daily with Victoza, Disp: 50 each, Rfl: 11     lamoTRIgine (LAMICTAL) 100 MG tablet, Take 100 mg by mouth daily, Disp: , Rfl:      levonorgestrel (MIRENA) 20 MCG/DAY IUD, 1 each (20 mcg) by Intrauterine route once, Disp: , Rfl:      levothyroxine (SYNTHROID/LEVOTHROID) 50 MCG tablet, Take 1 tablet (50 mcg) by mouth daily before breakfast, Disp: 90 tablet, Rfl: 3     liraglutide (VICTOZA) 18 MG/3ML solution, Inject 1.8 mg Subcutaneous daily, Disp: 9 mL, Rfl: 3     mineral oil-hydrophilic petrolatum (AQUAPHOR) external ointment, Apply topically daily as needed for dry skin (apply to dry skin and rash around umbilical hernia), Disp: , Rfl:      risperiDONE microspheres ER (RISPERDAL CONSTA) 50 MG injection, Next due 6/30, Disp: , Rfl:      spacer (OPTICHAMBER APOLINAR) holding chamber, Use with Symbicort and albuterol inhalers as prescribed, Disp: 1 each, Rfl: 0     lithium ER (LITHOBID) 300 MG CR tablet, daily (Patient not taking: Reported on 9/30/2022), Disp: , Rfl:      OLANZapine (ZYPREXA) 5 MG tablet, Take 5 mg by mouth At Bedtime (Patient not taking: Reported on 10/14/2022), Disp: , Rfl:      traZODone (DESYREL) 50 MG tablet, , Disp: , Rfl:   Immunization History   Administered Date(s) Administered      "COVID-19,PF,Pfizer (12+ Yrs) 05/18/2021, 06/08/2021, 01/19/2022     COVID-19,PF,Pfizer 12+ YRS BIVALENT Booster 10/14/2022     DTAP (<7y) 03/01/1985, 06/01/1985, 07/01/1985, 07/01/1986, 07/26/1990     HPV Quadrivalent 12/12/2008, 09/06/2011     Hep B, Peds or Adolescent 02/16/1998     Historical DTP/aP 03/01/1985, 06/01/1985, 07/01/1985, 07/01/1986, 07/26/1990     Historical Hepb 02/16/1998     Influenza (IIV3) PF 12/09/2008, 12/06/2012     Influenza Vaccine IM > 6 months Valent IIV4 (Alfuria,Fluzone) 11/21/2017, 01/03/2019, 09/17/2019, 09/16/2020, 10/14/2022     MMR 02/27/1986, 07/17/1997     Poliovirus, inactivated (IPV) 06/01/1985, 07/01/1985, 01/01/1987, 07/26/1990     TDAP Vaccine (Adacel) 06/30/1997, 03/31/2008, 05/14/2010, 12/06/2012     Td (Adult), Adsorbed 06/30/1997     Allergies   Allergen Reactions     Dust Mites Shortness Of Breath     Animal Dander      Other reaction(s): *Unknown     Mold      Other reaction(s): Runny Nose     Trees      OBJECTIVE:                                                                 /82 (BP Location: Right arm, Patient Position: Sitting, Cuff Size: Adult Large)   Pulse 87   Ht 1.6 m (5' 3\")   Wt 105.7 kg (233 lb)   LMP  (LMP Unknown)   SpO2 93%   BMI 41.27 kg/m          Physical Exam  Vitals and nursing note reviewed.   Constitutional:       Appearance: She is not diaphoretic.   HENT:      Head: Normocephalic and atraumatic.      Right Ear: Tympanic membrane, ear canal and external ear normal.      Left Ear: Tympanic membrane, ear canal and external ear normal.      Nose: Septal deviation (mild, to the right) present. No mucosal edema or rhinorrhea.      Mouth/Throat:      Mouth: Mucous membranes are moist.      Pharynx: Oropharynx is clear. No oropharyngeal exudate.   Eyes:      General:         Right eye: No discharge.         Left eye: No discharge.      Conjunctiva/sclera:      Right eye: Right conjunctiva is not injected.      Left eye: Left conjunctiva is " not injected.   Cardiovascular:      Rate and Rhythm: Normal rate and regular rhythm.      Heart sounds: Normal heart sounds. No murmur heard.  Pulmonary:      Effort: Pulmonary effort is normal. No respiratory distress.      Breath sounds: Normal breath sounds and air entry. No decreased breath sounds, wheezing, rhonchi or rales.   Musculoskeletal:         General: Normal range of motion.      Cervical back: Normal range of motion.   Skin:     General: Skin is warm.   Neurological:      Mental Status: She is oriented to person, place, and time.   Psychiatric:         Mood and Affect: Affect is flat.         Behavior: Behavior normal.      Comments: Pressured speech.  Poor eye contact           ASSESSMENT/PLAN:    Shortness of breath  Well-controlled with Symbicort 80/4.5 mcg 2 puffs once-twice daily and albuterol as needed.  - Continue as is.  - I will order interval PFT.  If no concerns, I will see him back in a year.    - General PFT Lab (Please always keep checked)  - Pulmonary Function Test  - budesonide-formoterol (SYMBICORT) 80-4.5 MCG/ACT Inhaler  Dispense: 10.2 g; Refill: 11    Seasonal allergic rhinitis due to pollen  Allergic rhinitis due to animals  Allergic rhinitis caused by mold  Allergic rhinitis due to dust mite    Symptoms are fairly well controlled with cetirizine 10 mg by mouth once daily, intranasal fluticasone 2 sprays in each nostril once daily, and azelastine as needed.  - Continue as is.    - cetirizine (ZYRTEC) 10 MG tablet  Dispense: 90 tablet; Refill: 3  - fluticasone (FLONASE) 50 MCG/ACT nasal spray  Dispense: 15.8 mL; Refill: 11             Return in about 1 year (around 10/28/2023), or if symptoms worsen or fail to improve.    Thank you for allowing us to participate in the care of this patient. Please feel free to contact us if there are any questions or concerns about the patient.    Disclaimer: This note consists of symbols derived from keyboarding, dictation and/or voice  recognition software. As a result, there may be errors in the script that have gone undetected. Please consider this when interpreting information found in this chart.    Rudy hSin MD, FAAAAI, FACAAI  Allergy, Asthma and Immunology     [facility]

## 2022-10-28 NOTE — NURSING NOTE
Chief Complaint   Patient presents with     Allergic Rhinitis     Follow up Seasonal Allergic Rhinitis due to pollen       There were no vitals filed for this visit.  Wt Readings from Last 1 Encounters:   10/14/22 104.3 kg (230 lb)       Alka De Jesus MA

## 2022-10-28 NOTE — LETTER
10/28/2022         RE: Maddy Ortiz  [address]  [facility] [address]        Dear Colleague,    Thank you for referring your patient, Maddy Ortiz, to the [facility]. Please see a copy of my visit note below.    SUBJECTIVE:                                                                   Maddy Ortiz presents today to our Allergy Clinic at [facility] for a follow up visit. She is a 36 year old female with allergic rhinitis and episodes of shortness of breath on exertion.       Percutaneous skin puncture testing for aeroallergens performed in November 2016 showed sensitivity to dog, cat, dust mite, molds, pollen of trees, grass, and weeds. In spring-summer 2018, she had a buildup using cluster schedule for allergen immunotherapy.  She reached maintenance dose in July 2018.  One episode of anaphylaxis on September 8, 2020, due to allergen immunotherapy, Red 1:1, 0.5mL   Was given epi x 2.  She tolerated a lower dose x 1 after that. She stopped taking hydroxyzine.  Take cetirizine 10 mg by mouth once daily.  Sometimes, when symptoms are worse, she may take 20 mg by mouth.  She stopped allergen immunotherapy after September 22, 2020.  She has a history of dyspnea on exertion. She was asked several times if albuterol inhaler was helpful or not, and she was not sure. In November, she had PFT, which showed a mild obstructive/restrictive pattern. There was a questionable postbronchodilator partial reversibility.    Uses Symbicort 80/4.5 mcg 2 puffs in the morning all the time, sometimes forgets to use it in the evening. Overall she feels way better on this regimen than before. She rarely needs to use albuterol. Denies nocturnal symptoms. No ED/PCP/urgent care/other specialist visits for asthma flare since the last visit. Doesn't think she was on prednisone in the past.         Most of the time, she takes cetirizine 10 mg by mouth once daily. She  uses intranasal fluticasone 2 sprays in each nostril daily and azelastine as needed.    She has rhinorrhea and nasal congestion once a week. Symptoms can vary 3-5/10 in severity. They may last for 1 day, and then she is better. She feels her symptoms are fairly well controlled.       Patient Active Problem List   Diagnosis     Animal dander allergy     CARDIOVASCULAR SCREENING; LDL GOAL LESS THAN 160     Psychosis (H)     Paranoid type delusional disorder (H)     Bipolar 1 disorder (H)     Insomnia     Depression with anxiety     Seasonal allergic rhinitis due to pollen     Allergic rhinitis due to mold     Allergic rhinitis due to animal dander     Allergic rhinitis due to dust mite     Encounter for IUD insertion     Schizoaffective disorder, bipolar type (H)     Gastroesophageal reflux disease without esophagitis     Paranoia (psychosis) (H)     Morbid obesity (H)     Schizoaffective disorder (H)     Umbilical hernia without obstruction and without gangrene     Fatty liver     Type 2 diabetes mellitus without complication, without long-term current use of insulin (H)     Elevated LFTs     Disorganized behavior     Infection due to 2019 novel coronavirus     Polypharmacy     Sedated due to multiple medications     Overdose, undetermined intent, initial encounter     Segmental and somatic dysfunction     SI (sacroiliac) joint dysfunction     mirena IUD 9/30/22       Past Medical History:   Diagnosis Date     Bipolar 1 disorder (H) 12/6/2012     Depressive disorder      Diabetes mellitus, type 2 (H) 12/13/2021     Paranoid type delusional disorder (H) 11/14/2012      *Patient is Adopted       Problem (# of Occurrences) Relation (Name,Age of Onset)    Unknown/Adopted (3) Mother, Father, Other    Hypertension (1) Sister        Past Surgical History:   Procedure Laterality Date     TONSILLECTOMY & ADENOIDECTOMY      as a child     TONSILLECTOMY & ADENOIDECTOMY       Social History     Socioeconomic History     Marital  status: Single     Spouse name: None     Number of children: None     Years of education: None     Highest education level: None   Occupational History     Employer: CURRENTLY UNEMPLOYED AT THIS TIME   Tobacco Use     Smoking status: Former     Packs/day: 0.50     Types: Cigarettes     Smokeless tobacco: Former     Tobacco comments:     around 2nd hand smoke   Vaping Use     Vaping Use: Never used   Substance and Sexual Activity     Alcohol use: Not Currently     Drug use: Not Currently     Sexual activity: Not Currently     Partners: Male     Birth control/protection: Abstinence, None   Other Topics Concern     Parent/sibling w/ CABG, MI or angioplasty before 65F 55M? No   Social History Narrative    One child    Education: some college        October 28, 2022        ENVIRONMENTAL HISTORY: [place] The home is heated with a electric furnace. They do not have central air conditioning, does have box air conditioner in the wall and has air purifier. The patient's bedroom is furnished with stuffed animals in bed, carpeting in bedroom and fabric window coverings. Pets inside the apartment includes 1 cat. There is history of cockroach or mice infestation. There are no smokers in the house.  The apartment does not have a basement.         Alka De Jesus MA           Review of Systems   HENT: Positive for congestion and rhinorrhea. Negative for ear pain, postnasal drip, sinus pressure, sinus pain, sneezing and sore throat.    Eyes: Negative for pain, discharge, redness and itching.   Respiratory: Negative for cough, chest tightness, shortness of breath and wheezing.    Allergic/Immunologic: Positive for environmental allergies.           Current Outpatient Medications:      acetaminophen (TYLENOL) 325 MG tablet, Take 2 tablets (650 mg) by mouth every 4 hours as needed for mild pain (to moderate pain), Disp: , Rfl:      albuterol (PROAIR HFA/PROVENTIL HFA/VENTOLIN HFA)  108 (90 Base) MCG/ACT inhaler, Inhale 2 puffs into the lungs every 4 hours as needed for wheezing or shortness of breath / dyspnea, Disp: 18 g, Rfl: 1     azelastine (ASTELIN) 0.1 % nasal spray, Spray 2 sprays into both nostrils 2 times daily as needed for rhinitis, Disp: 30 mL, Rfl: 3     blood glucose (NO BRAND SPECIFIED) test strip, Use to test blood sugar 6 times daily or as directed., Disp: 300 strip, Rfl: 11     budesonide-formoterol (SYMBICORT) 80-4.5 MCG/ACT Inhaler, Inhale 2 puffs into the lungs 2 times daily, Disp: 10.2 g, Rfl: 11     cetirizine (ZYRTEC) 10 MG tablet, Take 1 tablet (10 mg) by mouth nightly as needed for allergies or rhinitis, Disp: 90 tablet, Rfl: 3     fluticasone (FLONASE) 50 MCG/ACT nasal spray, Spray 2 sprays into both nostrils daily, Disp: 15.8 mL, Rfl: 11     hydrOXYzine (ATARAX) 50 MG tablet, TAKE 1-2 TABLETS BY MOUTH DAILY AT BEDTIME., Disp: , Rfl:      insulin pen needle (32G X 4 MM) 32G X 4 MM miscellaneous, Use 1 pen needles daily with Victoza, Disp: 50 each, Rfl: 11     lamoTRIgine (LAMICTAL) 100 MG tablet, Take 100 mg by mouth daily, Disp: , Rfl:      levonorgestrel (MIRENA) 20 MCG/DAY IUD, 1 each (20 mcg) by Intrauterine route once, Disp: , Rfl:      levothyroxine (SYNTHROID/LEVOTHROID) 50 MCG tablet, Take 1 tablet (50 mcg) by mouth daily before breakfast, Disp: 90 tablet, Rfl: 3     liraglutide (VICTOZA) 18 MG/3ML solution, Inject 1.8 mg Subcutaneous daily, Disp: 9 mL, Rfl: 3     mineral oil-hydrophilic petrolatum (AQUAPHOR) external ointment, Apply topically daily as needed for dry skin (apply to dry skin and rash around umbilical hernia), Disp: , Rfl:      risperiDONE microspheres ER (RISPERDAL CONSTA) 50 MG injection, Next due 6/30, Disp: , Rfl:      spacer (OPTICHAMBER APOLINAR) holding chamber, Use with Symbicort and albuterol inhalers as prescribed, Disp: 1 each, Rfl: 0     lithium ER (LITHOBID) 300 MG CR tablet, daily (Patient not taking: Reported on 9/30/2022), Disp:  ", Rfl:      OLANZapine (ZYPREXA) 5 MG tablet, Take 5 mg by mouth At Bedtime (Patient not taking: Reported on 10/14/2022), Disp: , Rfl:      traZODone (DESYREL) 50 MG tablet, , Disp: , Rfl:   Immunization History   Administered Date(s) Administered     COVID-19,PF,Pfizer (12+ Yrs) 05/18/2021, 06/08/2021, 01/19/2022     COVID-19,PF,Pfizer 12+ YRS BIVALENT Booster 10/14/2022     DTAP (<7y) 03/01/1985, 06/01/1985, 07/01/1985, 07/01/1986, 07/26/1990     HPV Quadrivalent 12/12/2008, 09/06/2011     Hep B, Peds or Adolescent 02/16/1998     Historical DTP/aP 03/01/1985, 06/01/1985, 07/01/1985, 07/01/1986, 07/26/1990     Historical Hepb 02/16/1998     Influenza (IIV3) PF 12/09/2008, 12/06/2012     Influenza Vaccine IM > 6 months Valent IIV4 (Alfuria,Fluzone) 11/21/2017, 01/03/2019, 09/17/2019, 09/16/2020, 10/14/2022     MMR 02/27/1986, 07/17/1997     Poliovirus, inactivated (IPV) 06/01/1985, 07/01/1985, 01/01/1987, 07/26/1990     TDAP Vaccine (Adacel) 06/30/1997, 03/31/2008, 05/14/2010, 12/06/2012     Td (Adult), Adsorbed 06/30/1997     Allergies   Allergen Reactions     Dust Mites Shortness Of Breath     Animal Dander      Other reaction(s): *Unknown     Mold      Other reaction(s): Runny Nose     Trees      OBJECTIVE:                                                                 /82 (BP Location: Right arm, Patient Position: Sitting, Cuff Size: Adult Large)   Pulse 87   Ht 1.6 m (5' 3\")   Wt 105.7 kg (233 lb)   LMP  (LMP Unknown)   SpO2 93%   BMI 41.27 kg/m          Physical Exam  Vitals and nursing note reviewed.   Constitutional:       Appearance: She is not diaphoretic.   HENT:      Head: Normocephalic and atraumatic.      Right Ear: Tympanic membrane, ear canal and external ear normal.      Left Ear: Tympanic membrane, ear canal and external ear normal.      Nose: Septal deviation (mild, to the right) present. No mucosal edema or rhinorrhea.      Mouth/Throat:      Mouth: Mucous membranes are moist.      " Pharynx: Oropharynx is clear. No oropharyngeal exudate.   Eyes:      General:         Right eye: No discharge.         Left eye: No discharge.      Conjunctiva/sclera:      Right eye: Right conjunctiva is not injected.      Left eye: Left conjunctiva is not injected.   Cardiovascular:      Rate and Rhythm: Normal rate and regular rhythm.      Heart sounds: Normal heart sounds. No murmur heard.  Pulmonary:      Effort: Pulmonary effort is normal. No respiratory distress.      Breath sounds: Normal breath sounds and air entry. No decreased breath sounds, wheezing, rhonchi or rales.   Musculoskeletal:         General: Normal range of motion.      Cervical back: Normal range of motion.   Skin:     General: Skin is warm.   Neurological:      Mental Status: She is oriented to person, place, and time.   Psychiatric:         Mood and Affect: Affect is flat.         Behavior: Behavior normal.      Comments: Pressured speech.  Poor eye contact           ASSESSMENT/PLAN:    Shortness of breath  Well-controlled with Symbicort 80/4.5 mcg 2 puffs once-twice daily and albuterol as needed.  - Continue as is.  - I will order interval PFT.  If no concerns, I will see him back in a year.    - General PFT Lab (Please always keep checked)  - Pulmonary Function Test  - budesonide-formoterol (SYMBICORT) 80-4.5 MCG/ACT Inhaler  Dispense: 10.2 g; Refill: 11    Seasonal allergic rhinitis due to pollen  Allergic rhinitis due to animals  Allergic rhinitis caused by mold  Allergic rhinitis due to dust mite    Symptoms are fairly well controlled with cetirizine 10 mg by mouth once daily, intranasal fluticasone 2 sprays in each nostril once daily, and azelastine as needed.  - Continue as is.    - cetirizine (ZYRTEC) 10 MG tablet  Dispense: 90 tablet; Refill: 3  - fluticasone (FLONASE) 50 MCG/ACT nasal spray  Dispense: 15.8 mL; Refill: 11             Return in about 1 year (around 10/28/2023), or if symptoms worsen or fail to improve.    Thank you  for allowing us to participate in the care of this patient. Please feel free to contact us if there are any questions or concerns about the patient.    Disclaimer: This note consists of symbols derived from keyboarding, dictation and/or voice recognition software. As a result, there may be errors in the script that have gone undetected. Please consider this when interpreting information found in this chart.    Rudy Shin MD, FAAJERICAI, FACJERICAI  Allergy, Asthma and Immunology     [facility]       Again, thank you for allowing me to participate in the care of your patient.        Sincerely,        Rudy Shin MD

## 2022-10-31 ENCOUNTER — TELEPHONE (OUTPATIENT)
Dept: ALLERGY | Facility: CLINIC | Age: 37
End: 2022-10-31

## 2022-10-31 NOTE — TELEPHONE ENCOUNTER
"Patient calling to ask \"I am wondering about scheduling my Pulmonary test, I called the number on my discharge papers but they said to call [place] directly. I wasn't sure who to call, I think I should ask the Doctor..\"    \"I am not sure what Dr. Shin wants me to have done?..\"    Please call patient. I did ask her if she called [place] respiratory as it sounded like she was to schedule PFT, but she \"wants to check with Dr. Shin\"    Rosaura Adams RN      "

## 2022-11-01 NOTE — TELEPHONE ENCOUNTER
Called and left message. Informed to call [phone] to schedule PFT.    Callie VELASQUEZ RN  Specialty/Allergy Clinics

## 2022-11-03 ENCOUNTER — VIRTUAL VISIT (OUTPATIENT)
Dept: EDUCATION SERVICES | Facility: CLINIC | Age: 37
End: 2022-11-03
Payer: MEDICARE

## 2022-11-03 DIAGNOSIS — E11.9 TYPE 2 DIABETES MELLITUS WITHOUT COMPLICATION, WITHOUT LONG-TERM CURRENT USE OF INSULIN (H): Primary | ICD-10-CM

## 2022-11-03 PROCEDURE — 98967 PH1 ASSMT&MGMT NQHP 11-20: CPT | Mod: 95 | Performed by: DIETITIAN, REGISTERED

## 2022-11-03 NOTE — PROGRESS NOTES
Diabetes Education Follow-up    Subjective/Objective:  Type of service:  Video Visit  Originating Location (pt. Location): Home  Distant Location (provider location): Home  Mode of Communication:  Video Conference via TM3 Software  Video Start Time: 7:35  Video End Time (time video stopped): 755    Diabetes is being managed with   Lifestyle (diet/activity), Diabetes Medications   Diabetes Medication(s)     Incretin Mimetic Agents (GLP-1 Receptor Agonists)       liraglutide (VICTOZA) 18 MG/3ML solution    Inject 1.8 mg Subcutaneous daily        BG/Food Log:   Notes a few blood sugars in the 60s.  Only checks when feeling off / lower.      Lab Results   Component Value Date    A1C 5.4 10/14/2022    A1C 6.4 06/30/2022    A1C 6.0 06/19/2022    A1C 7.3 04/08/2022    A1C 8.4 01/08/2022    A1C 4.7 06/09/2013     Weight at home 231   Wt Readings from Last 5 Encounters:   10/28/22 105.7 kg (233 lb)   10/14/22 104.3 kg (230 lb)   09/30/22 110 kg (242 lb 6.4 oz)   08/30/22 107 kg (236 lb)   08/25/22 107 kg (236 lb)       Assessment:  Has some episodes of nausea, but not a lot.  Reviewed treatment of low blood sugars with carbohydrate.  Commended on A1c; much improved and doing well.  No changes to medication plan.  Maddy reviewed her troubles with regulating her mood and concerns about her electric cart while shopping.  She has a plan to discuss with her [employer] worker on Monday.  Appreciate and dicussed how it is important to review these concerns.    No concerns with blood sugar control and continue with current plan  Set follow up in December to check in again.     Plan/Response:  Continue with positive diet & lifestyle changes   Follow up 12/27   Rudy anytime before then with questions / concerns / updates     Anna Hampton RD, LD, CDCES  Diabetes Education  Time spent 20 minutes      A copy of this encounter was shared with the provider.

## 2022-11-18 DIAGNOSIS — E11.9 TYPE 2 DIABETES MELLITUS WITHOUT COMPLICATION, WITHOUT LONG-TERM CURRENT USE OF INSULIN (H): ICD-10-CM

## 2022-11-21 RX ORDER — LIRAGLUTIDE 6 MG/ML
INJECTION SUBCUTANEOUS
Qty: 9 ML | Refills: 0 | Status: SHIPPED | OUTPATIENT
Start: 2022-11-21 | End: 2022-12-05

## 2022-11-23 ENCOUNTER — MYC MEDICAL ADVICE (OUTPATIENT)
Dept: FAMILY MEDICINE | Facility: CLINIC | Age: 37
End: 2022-11-23

## 2022-11-23 NOTE — TELEPHONE ENCOUNTER
No Tylenol    Ibuprofen 600 mg every 6 hours as needed    Glad to hear that she is exercising - muscles will be sore and that is to be expected. It will get better with time.    Shirley Freeman, CNP

## 2022-11-23 NOTE — TELEPHONE ENCOUNTER
Please see patient's MyChart message.    Patient is asking for advice on how much ibuprofen or tylenol she can take.    Writer found that most recent CMP showed elveated AST and ALT.    Routed to [facility] for clarification:  How much tylenol can patient take?    [facility]

## 2022-11-29 ENCOUNTER — TELEPHONE (OUTPATIENT)
Dept: FAMILY MEDICINE | Facility: CLINIC | Age: 37
End: 2022-11-29

## 2022-11-29 NOTE — TELEPHONE ENCOUNTER
S-(situation): hernia    B-(background): umbilical hernia for a yr.  Noted at times she has pinkish discharge.  Now is experiencing discharge for 2-3 days.  With odor.  No redness or pain to the area.  No fever.  Was scheduled to have hernia repair but it was cancelled as she has no one to stay with her for after surgery.     A-(assessment): umbilical hernia    R-(recommendations): appt made. Nicci PONCE RN

## 2022-12-03 ENCOUNTER — TELEPHONE (OUTPATIENT)
Dept: FAMILY MEDICINE | Facility: CLINIC | Age: 37
End: 2022-12-03

## 2022-12-03 NOTE — PROGRESS NOTES
Bariatric Care Clinic Non Surgical Follow up Visit   Date of visit: 12/5/2022  Physician: LONDON Serna MD, MD  Primary Care is Shirley Freeman.  Maddy Ortiz   36 year old  female     Initial Weight: 202#  Initial BMI: 34.14  Today's Weight:   Wt Readings from Last 1 Encounters:   12/05/22 104.3 kg (230 lb)     Body mass index is 40.74 kg/m .           Assessment and Plan   Assessment: Maddy is a 36 year old year old female who presents for medical weight management.      Plan:    1. Morbid obesity (H)  Patient was congratulated on the healthy changes she has made thus far. Healthy habits to assist with further weight loss were discussed. She is meeting with our dietician later today. She would benefit from more dietary education. She is going to try to limit sandwiches to one per day. She will try to increase her vegetables. She will continue the victoza.. We discussed the patient's co-morbid conditions including type 2 DM. These likely will improve with healthy habits and weight loss.    2. Type 2 diabetes mellitus without complication, without long-term current use of insulin (H)  This may improve with healthy habits and weight loss.    Follow up in 3 months with myself           INTERIM HISTORY  Patient is taking victoza and she thinks it is helping to control her appetite. At her last visit in August she set goals to get 20 gms of protein per meal and increase her vegetable intake. She has been having a lot of back pain. She went to the ER and was prescribed percocet. She is having some constipation but is managing it. She is seeing a chiropractor. She continues to struggle with her mental health.     DIETARY HISTORY  Meals Per Day: 2-3  Eating Protein First?: sometimes  Food Diary: B:sometimes skips L:peanut butter or lunch meat sandwich on white bread (2-4 pieces) D:protein (sausage, hamburger, chicken) pasta, pizza, sometimes with vegetables, potatoes  Typical Snack:  fruit, baby carrots,  cookies  Fluid Intake: working on it  Portion Control: no  Calorie Containing Beverages: decreased soda  Typical Protein Food Choices: beef, sausage  Choosing Whole Grains: no  Meals at Restaurant per week:rare    Positive Changes Since Last Visit: some meal planning  Struggling With: finances, back pain, she states she is struggling fitting in meals with all of her appointments    Knowledgeable in Reading Food Labels: no  Getting Adequate Protein: sometimes  Sleeping 7-8 hours/day yes    PHYSICAL ACTIVITY PATTERNS:  Some walking, does use electric carts when she goes shopping, some cleaning    REVIEW OF SYSTEMS  GENERAL/CONSTITUTIONAL:  Fatigue: yes  HEENT:   glaucoma: no  CARDIOVASCULAR:  History of heart disease: no  PULMONARY:  Dyspnea on exertion: not exerting herself  PSYCHIATRIC:  Moods: struggling  MUSCULOSKELETAL/RHEUMATOLOGIC  Arthralgias: yes  Myalgias: yes  ENDOCRINE:  Monitoring Blood Sugars: no  Sugars Well Controlled: na  No personal or family history of medullary thyroid cancer no  :  Birth control: not discussed       Patient Profile   Social History     Social History Narrative    One child    Education: some college        October 28, 2022        ENVIRONMENTAL HISTORY: [place] The home is heated with a electric furnace. They do not have central air conditioning, does have box air conditioner in the wall and has air purifier. The patient's bedroom is furnished with stuffed animals in bed, carpeting in bedroom and fabric window coverings. Pets inside the apartment includes 1 cat. There is history of cockroach or mice infestation. There are no smokers in the house.  The apartment does not have a basement.         Alka De Jesus MA        Past Medical History   Past Medical History:   Diagnosis Date     Bipolar 1 disorder (H) 12/6/2012     Depressive disorder      Diabetes mellitus, type 2 (H) 12/13/2021     Paranoid type delusional disorder  "(H) 11/14/2012     Patient Active Problem List   Diagnosis     Animal dander allergy     CARDIOVASCULAR SCREENING; LDL GOAL LESS THAN 160     Psychosis (H)     Paranoid type delusional disorder (H)     Bipolar 1 disorder (H)     Insomnia     Depression with anxiety     Seasonal allergic rhinitis due to pollen     Allergic rhinitis due to mold     Allergic rhinitis due to animal dander     Allergic rhinitis due to dust mite     Encounter for IUD insertion     Schizoaffective disorder, bipolar type (H)     Gastroesophageal reflux disease without esophagitis     Paranoia (psychosis) (H)     Morbid obesity (H)     Schizoaffective disorder (H)     Umbilical hernia without obstruction and without gangrene     Fatty liver     Type 2 diabetes mellitus without complication, without long-term current use of insulin (H)     Elevated LFTs     Disorganized behavior     Infection due to 2019 novel coronavirus     Polypharmacy     Sedated due to multiple medications     Overdose, undetermined intent, initial encounter     Segmental and somatic dysfunction     SI (sacroiliac) joint dysfunction     mirena IUD 9/30/22       Past Surgical History  She has a past surgical history that includes tonsillectomy & adenoidectomy and tonsillectomy & adenoidectomy.     Examination   Ht 1.6 m (5' 3\")   Wt 104.3 kg (230 lb)   BMI 40.74 kg/m    GENERAL: Healthy, alert and no distress  EYES: Eyes grossly normal to inspection.  No discharge or erythema, or obvious scleral/conjunctival abnormalities.  RESP: No audible wheeze, cough, or visible cyanosis.  No visible retractions or increased work of breathing.    SKIN: Visible skin clear. No significant rash, abnormal pigmentation or lesions.  NEURO: Cranial nerves grossly intact.  Mentation and speech appropriate for age.  PSYCH: Mentation appears normal, affect normal/bright, judgement and insight intact, normal speech and appearance well-groomed.     Counseling:   We reviewed the important post " op bariatric recommendations:  -eating 3 meals daily  -eating protein first, getting >60gm protein daily  -eating slowly, chewing food well  -avoiding/limiting calorie containing beverages  -limiting starchy vegetables and carbohydrates, choosing wheat, not white with breads,   crackers, pastas, marquita, bagels, tortillas, rice  -limiting restaurant or cafeteria eating to twice a week or less    We discussed the importance of restorative sleep and stress management in maintaining a healthy weight.  We discussed the National Weight Control Registry healthy weight maintenance strategies and ways to optimize metabolism.  We discussed the importance of physical activity including cardiovascular and strength training in maintaining a healthier weight.    Total time spent on the date of this encounter doing: chart review, review of test results, patient visit, physical exam, education, counseling, developing plan of care and documenting = 34 minutes.         LONDON Serna MD  [facility]      Maddy Ortiz is 37 year old  female who presents for a billable video visit today.    How would you like to obtain your AVS? MyChart  If dropped from the video visit, the video invitation should be resent by: Send to e-mail at: [email or web address]  Will anyone else be joining your video visit? No      Video Start Time:9:09 am    Are there any specific questions or needs that you would like addressed at your visit today?     Weight management. Nothing specific that she would like to discuss today.    Zandra Little CMA  [facility]  [facility]  Weight Management Clinic [address] [address]  Office: [phone]  Fax: [phone]            Video-Visit Details    Type of service:  Video Visit    Platform used for Video Visit: [employer]    Video End Time (time video stopped): 9:33 AM    Originating Location (pt.  Location): Home    Distant Location (provider location):  Off-site    Distant Location (provider location):  [facility] SURGERY [facility] AND BARIATRICS [facility]

## 2022-12-03 NOTE — TELEPHONE ENCOUNTER
General Call    Contacts       Type Contact Phone/Fax    12/03/2022 04:14 AM CST Phone (Incoming) Maddy Ortiz (Self) [phone] (M)        Reason for Call:  Missed appointment     What are your questions or concerns:  Patient would like to apologize for missing the appointment on 12/1/22. She states she made a mistake in her calender     Date of last appointment with provider: 10/14/2022    Could we send this information to you in [place] or would you prefer to receive a phone call?:   Patient would prefer a phone call   Okay to leave a detailed message?: Yes at Home number on file [phone] (home)

## 2022-12-05 ENCOUNTER — VIRTUAL VISIT (OUTPATIENT)
Dept: SURGERY | Facility: CLINIC | Age: 37
End: 2022-12-05
Payer: MEDICARE

## 2022-12-05 VITALS — WEIGHT: 230 LBS | BODY MASS INDEX: 40.75 KG/M2 | HEIGHT: 63 IN

## 2022-12-05 DIAGNOSIS — E11.9 TYPE 2 DIABETES MELLITUS WITHOUT COMPLICATION, WITHOUT LONG-TERM CURRENT USE OF INSULIN (H): ICD-10-CM

## 2022-12-05 DIAGNOSIS — E66.01 MORBID OBESITY WITH BMI OF 40.0-44.9, ADULT (H): Primary | ICD-10-CM

## 2022-12-05 DIAGNOSIS — E66.01 MORBID OBESITY (H): Primary | ICD-10-CM

## 2022-12-05 PROCEDURE — 99214 OFFICE O/P EST MOD 30 MIN: CPT | Mod: 95 | Performed by: FAMILY MEDICINE

## 2022-12-05 PROCEDURE — 97803 MED NUTRITION INDIV SUBSEQ: CPT | Mod: 95 | Performed by: DIETITIAN, REGISTERED

## 2022-12-05 RX ORDER — OXYCODONE AND ACETAMINOPHEN 5; 325 MG/1; MG/1
1 TABLET ORAL
COMMUNITY
Start: 2022-12-02 | End: 2022-12-31

## 2022-12-05 RX ORDER — LIRAGLUTIDE 6 MG/ML
INJECTION SUBCUTANEOUS
Qty: 9 ML | Refills: 0 | Status: SHIPPED | OUTPATIENT
Start: 2022-12-05 | End: 2023-02-15

## 2022-12-05 RX ORDER — LAMOTRIGINE 200 MG/1
200 TABLET ORAL AT BEDTIME
COMMUNITY
Start: 2022-10-24

## 2022-12-05 NOTE — TELEPHONE ENCOUNTER
Patient was called regarding message. She would like to have another appointment scheduled. States she has umbilical hernia for the last 14 years. She is having some drainage but improved from when she made her last clinic appointment.She did miss her last appointment. Scant amount of drainage today. Denies pain and fever.    Radha Rose RN

## 2022-12-05 NOTE — LETTER
12/5/2022         RE: Maddy Ortiz  [address]  [place] [address]        Dear Colleague,    Thank you for referring your patient, Maddy Ortiz, to the [facility] SURGERY CLINIC AND BARIATRICS CARE [place]. Please see a copy of my visit note below.    Bariatric Care Clinic Non Surgical Follow up Visit   Date of visit: 12/5/2022  Physician: LONDON Serna MD, MD  Primary Care is Shirley Freeman.  Maddy Ortiz   36 year old  female     Initial Weight: 202#  Initial BMI: 34.14  Today's Weight:   Wt Readings from Last 1 Encounters:   12/05/22 104.3 kg (230 lb)     Body mass index is 40.74 kg/m .           Assessment and Plan   Assessment: Maddy is a 36 year old year old female who presents for medical weight management.      Plan:    1. Morbid obesity (H)  Patient was congratulated on the healthy changes she has made thus far. Healthy habits to assist with further weight loss were discussed. She is meeting with our dietician later today. She would benefit from more dietary education. She is going to try to limit sandwiches to one per day. She will try to increase her vegetables. She will continue the victoza.. We discussed the patient's co-morbid conditions including type 2 DM. These likely will improve with healthy habits and weight loss.    2. Type 2 diabetes mellitus without complication, without long-term current use of insulin (H)  This may improve with healthy habits and weight loss.    Follow up in 3 months with myself           INTERIM HISTORY  Patient is taking victoza and she thinks it is helping to control her appetite. At her last visit in August she set goals to get 20 gms of protein per meal and increase her vegetable intake. She has been having a lot of back pain. She went to the ER and was prescribed percocet. She is having some constipation but is managing it. She is seeing a chiropractor. She continues to struggle with her mental health.     DIETARY  HISTORY  Meals Per Day: 2-3  Eating Protein First?: sometimes  Food Diary: B:sometimes skips L:peanut butter or lunch meat sandwich on white bread (2-4 pieces) D:protein (sausage, hamburger, chicken) pasta, pizza, sometimes with vegetables, potatoes  Typical Snack:  fruit, baby carrots, cookies  Fluid Intake: working on it  Portion Control: no  Calorie Containing Beverages: decreased soda  Typical Protein Food Choices: beef, sausage  Choosing Whole Grains: no  Meals at Restaurant per week:rare    Positive Changes Since Last Visit: some meal planning  Struggling With: finances, back pain, she states she is struggling fitting in meals with all of her appointments    Knowledgeable in Reading Food Labels: no  Getting Adequate Protein: sometimes  Sleeping 7-8 hours/day yes    PHYSICAL ACTIVITY PATTERNS:  Some walking, does use electric carts when she goes shopping, some cleaning    REVIEW OF SYSTEMS  GENERAL/CONSTITUTIONAL:  Fatigue: yes  HEENT:   glaucoma: no  CARDIOVASCULAR:  History of heart disease: no  PULMONARY:  Dyspnea on exertion: not exerting herself  PSYCHIATRIC:  Moods: struggling  MUSCULOSKELETAL/RHEUMATOLOGIC  Arthralgias: yes  Myalgias: yes  ENDOCRINE:  Monitoring Blood Sugars: no  Sugars Well Controlled: na  No personal or family history of medullary thyroid cancer no  :  Birth control: not discussed       Patient Profile   Social History     Social History Narrative    One child    Education: some college        October 28, 2022        ENVIRONMENTAL HISTORY: [place] The home is heated with a electric furnace. They do not have central air conditioning, does have box air conditioner in the wall and has air purifier. The patient's bedroom is furnished with stuffed animals in bed, carpeting in bedroom and fabric window coverings. Pets inside the apartment includes 1 cat. There is history of cockroach or mice infestation. There are no smokers in the  "house.  The apartment does not have a basement.         Alka De Jesus MA        Past Medical History   Past Medical History:   Diagnosis Date     Bipolar 1 disorder (H) 12/6/2012     Depressive disorder      Diabetes mellitus, type 2 (H) 12/13/2021     Paranoid type delusional disorder (H) 11/14/2012     Patient Active Problem List   Diagnosis     Animal dander allergy     CARDIOVASCULAR SCREENING; LDL GOAL LESS THAN 160     Psychosis (H)     Paranoid type delusional disorder (H)     Bipolar 1 disorder (H)     Insomnia     Depression with anxiety     Seasonal allergic rhinitis due to pollen     Allergic rhinitis due to mold     Allergic rhinitis due to animal dander     Allergic rhinitis due to dust mite     Encounter for IUD insertion     Schizoaffective disorder, bipolar type (H)     Gastroesophageal reflux disease without esophagitis     Paranoia (psychosis) (H)     Morbid obesity (H)     Schizoaffective disorder (H)     Umbilical hernia without obstruction and without gangrene     Fatty liver     Type 2 diabetes mellitus without complication, without long-term current use of insulin (H)     Elevated LFTs     Disorganized behavior     Infection due to 2019 novel coronavirus     Polypharmacy     Sedated due to multiple medications     Overdose, undetermined intent, initial encounter     Segmental and somatic dysfunction     SI (sacroiliac) joint dysfunction     mirena IUD 9/30/22       Past Surgical History  She has a past surgical history that includes tonsillectomy & adenoidectomy and tonsillectomy & adenoidectomy.     Examination   Ht 1.6 m (5' 3\")   Wt 104.3 kg (230 lb)   BMI 40.74 kg/m    GENERAL: Healthy, alert and no distress  EYES: Eyes grossly normal to inspection.  No discharge or erythema, or obvious scleral/conjunctival abnormalities.  RESP: No audible wheeze, cough, or visible cyanosis.  No visible retractions or increased work of breathing.    SKIN: Visible skin clear. No significant rash, " abnormal pigmentation or lesions.  NEURO: Cranial nerves grossly intact.  Mentation and speech appropriate for age.  PSYCH: Mentation appears normal, affect normal/bright, judgement and insight intact, normal speech and appearance well-groomed.     Counseling:   We reviewed the important post op bariatric recommendations:  -eating 3 meals daily  -eating protein first, getting >60gm protein daily  -eating slowly, chewing food well  -avoiding/limiting calorie containing beverages  -limiting starchy vegetables and carbohydrates, choosing wheat, not white with breads,   crackers, pastas, marquita, bagels, tortillas, rice  -limiting restaurant or cafeteria eating to twice a week or less    We discussed the importance of restorative sleep and stress management in maintaining a healthy weight.  We discussed the National Weight Control Registry healthy weight maintenance strategies and ways to optimize metabolism.  We discussed the importance of physical activity including cardiovascular and strength training in maintaining a healthier weight.    Total time spent on the date of this encounter doing: chart review, review of test results, patient visit, physical exam, education, counseling, developing plan of care and documenting = 34 minutes.         LONDON Serna MD  [facility]      Maddy Ortiz is 37 year old  female who presents for a billable video visit today.    How would you like to obtain your AVS? MyChart  If dropped from the video visit, the video invitation should be resent by: Send to e-mail at: [email or web address]  Will anyone else be joining your video visit? No  {If patient encounters technical issues they should call [phone] :[id number]}    Video Start Time:9:09 am    Are there any specific questions or needs that you would like addressed at your visit today?     Weight management. Nothing specific that she would like to discuss today.    Zandra Little  [facility]  Surgery  Clinic - [facility]  Weight Management Clinic - [place]  [address]  [place] [address]  Office: [phone]  Fax: [phone]            Video-Visit Details    Type of service:  Video Visit    Platform used for Video Visit: [employer]    Video End Time (time video stopped): {video visit now:808975}    Originating Location (pt. Location): Home    {PROVIDER LOCATION On-site should be selected for visits conducted from your clinic location or adjoining [facility] hospital, academic office, or other nearby [facility] building. Off-site should be selected for all other provider locations, including home:[id number]}    Distant Location (provider location):  Off-site    Distant Location (provider location):  [facility] SURGERY CLINIC AND BARIATRICS CARE [place]          Again, thank you for allowing me to participate in the care of your patient.        Sincerely,        LONDON Serna MD

## 2022-12-05 NOTE — PROGRESS NOTES
Maddy Ortiz is a 36 year old who is being evaluated via a billable video visit.      How would you like to obtain your AVS? MyChart  If the video visit is dropped, the invitation should be resent by: Send to e-mail at: [email or web address]  Will anyone else be joining your video visit? No    Medical  Weight Loss Follow-Up Diet Evaluation  Assessment:  Maddy is presenting today for a follow up weight management nutrition consultation. Pt has had an initial appointment with Dr. Serna  Weight loss medication: victoza  Pt's weight is 230 lb   Initial weight: 202 lb  Weight change: down 23 lbs from high of 253 lbs, weight is trending downward    Changes and Difficulties 5/28/2022   I have made the following changes to my diet since my last visit: meal planning   With regards to my diet, I am still struggling with: -   I have made the following changes to my activity/exercise since my last visit: active physical activity   With regards to my activity/exercise, I am still struggling with: rest - tend to work hard 1 week rest the next week     BMI: There is no height or weight on file to calculate BMI.  Ideal body weight: 52.4 kg (115 lb 8.3 oz)  Adjusted ideal body weight: 74.3 kg (163 lb 11.4 oz)    Estimated RMR (Dickens-St Jeor equation):   1727 kcals x 1.2 (sedentary) = 2072 kcals (for weight maintenance)  Recommended Protein Intake: 60-80 grams of protein/day  Patient Active Problem List:  Patient Active Problem List   Diagnosis     Animal dander allergy     CARDIOVASCULAR SCREENING; LDL GOAL LESS THAN 160     Psychosis (H)     Paranoid type delusional disorder (H)     Bipolar 1 disorder (H)     Insomnia     Depression with anxiety     Seasonal allergic rhinitis due to pollen     Allergic rhinitis due to mold     Allergic rhinitis due to animal dander     Allergic rhinitis due to dust mite     Encounter for IUD insertion     Schizoaffective disorder, bipolar type (H)     Gastroesophageal reflux disease without  esophagitis     Paranoia (psychosis) (H)     Morbid obesity (H)     Schizoaffective disorder (H)     Umbilical hernia without obstruction and without gangrene     Fatty liver     Type 2 diabetes mellitus without complication, without long-term current use of insulin (H)     Elevated LFTs     Disorganized behavior     Infection due to 2019 novel coronavirus     Polypharmacy     Sedated due to multiple medications     Overdose, undetermined intent, initial encounter     Segmental and somatic dysfunction     SI (sacroiliac) joint dysfunction     mirena IUD 9/30/22     Diabetes: reports blood glucose running around 100- not checking regularly  Most recent A1c 6.4 in June    Progress on goals from last visit: She is planning to start meal planning and feels she finally has a grasp on how to do this. She watches her finances very closely and has a limited budget. She has a lot of appointments and preparing for the appointments takes a lot of time. Each days schedule varies with her appointment.    -Bought bread and sandwich meat for groceries yesterday   -Gets Mom's meals as well and goes to food shelf  1. Test blood glucose daily - not discussed  2. Eat breakfast daily - overall met, sometimes misses  3. Continue to increase veggies - working on this    Dietary Recall:  Not discussed in detail, but intake remains similar to previous visits:  Breakfast: bran flakes - tries to eat breakfast each morning, but sometimes skips  Lunch: sandwich and a lot of pretzels- states she is really tired today  Dinner:  ramen deli salad from grocery store  Beverages: water  Exercise: not discussed today  Nutrition Diagnosis:    Overweight/Obesity (NC 3.3) related to overeating and poor lifestyle habits as evidenced by patient's report of limited budget, inconsistent meals, large portions, frequent snacking of sweets, lack of activity and BMI 40.74    Intervention:  4. Food and/or nutrient delivery: continue to aim for 3 meals per day,  working on meal planning each week  5. Nutrition counseling: goal setting    Monitoring/Evaluation:    Goals:  1.  Plan and budget meals for the week  2. Eat breakfast daily/3 meals per day  3. Continue to increase veggies    Patient to follow up in 3 month(s) with bariatrician and 3 month(s) with RD    Video-Visit Details    Type of service:  Video Visit    Video Start Time (time video started): 9:59 am    Video End Time (time video stopped): 10:15 am    Originating Location (pt. Location): Home        Distant Location (provider location):  On-site    Mode of Communication:  Video Conference via [facility]    Physician has received verbal consent for a Video Visit from the patient? Yes      Angeli Domingo

## 2022-12-05 NOTE — LETTER
12/5/2022         RE: Maddy Ortiz  [address]  [place] [address]        Dear Colleague,    Thank you for referring your patient, Maddy Ortiz, to the [facility] [place]. Please see a copy of my visit note below.    Maddy Ortiz is a 36 year old who is being evaluated via a billable video visit.      How would you like to obtain your AVS? MyChart  If the video visit is dropped, the invitation should be resent by: Send to e-mail at: [email or web address]  Will anyone else be joining your video visit? No    Medical  Weight Loss Follow-Up Diet Evaluation  Assessment:  Maddy is presenting today for a follow up weight management nutrition consultation. Pt has had an initial appointment with Dr. Serna  Weight loss medication: victoza  Pt's weight is 230 lb   Initial weight: 202 lb  Weight change: down 23 lbs from high of 253 lbs, weight is trending downward    Changes and Difficulties 5/28/2022   I have made the following changes to my diet since my last visit: meal planning   With regards to my diet, I am still struggling with: -   I have made the following changes to my activity/exercise since my last visit: active physical activity   With regards to my activity/exercise, I am still struggling with: rest - tend to work hard 1 week rest the next week     BMI: There is no height or weight on file to calculate BMI.  Ideal body weight: 52.4 kg (115 lb 8.3 oz)  Adjusted ideal body weight: 74.3 kg (163 lb 11.4 oz)    Estimated RMR (Farnsworth-St Jeor equation):   1727 kcals x 1.2 (sedentary) = 2072 kcals (for weight maintenance)  Recommended Protein Intake: 60-80 grams of protein/day  Patient Active Problem List:  Patient Active Problem List   Diagnosis     Animal dander allergy     CARDIOVASCULAR SCREENING; LDL GOAL LESS THAN 160     Psychosis (H)     Paranoid type delusional disorder (H)     Bipolar 1 disorder (H)     Insomnia     Depression with anxiety      Seasonal allergic rhinitis due to pollen     Allergic rhinitis due to mold     Allergic rhinitis due to animal dander     Allergic rhinitis due to dust mite     Encounter for IUD insertion     Schizoaffective disorder, bipolar type (H)     Gastroesophageal reflux disease without esophagitis     Paranoia (psychosis) (H)     Morbid obesity (H)     Schizoaffective disorder (H)     Umbilical hernia without obstruction and without gangrene     Fatty liver     Type 2 diabetes mellitus without complication, without long-term current use of insulin (H)     Elevated LFTs     Disorganized behavior     Infection due to 2019 novel coronavirus     Polypharmacy     Sedated due to multiple medications     Overdose, undetermined intent, initial encounter     Segmental and somatic dysfunction     SI (sacroiliac) joint dysfunction     mirena IUD 9/30/22     Diabetes: reports blood glucose running around 100- not checking regularly  Most recent A1c 6.4 in June    Progress on goals from last visit: She is planning to start meal planning and feels she finally has a grasp on how to do this. She watches her finances very closely and has a limited budget. She has a lot of appointments and preparing for the appointments takes a lot of time. Each days schedule varies with her appointment.    -Bought bread and sandwich meat for groceries yesterday   -Gets Mom's meals as well and goes to food shelf  1. Test blood glucose daily - not discussed  2. Eat breakfast daily - overall met, sometimes misses  3. Continue to increase veggies - working on this    Dietary Recall:  Not discussed in detail, but intake remains similar to previous visits:  Breakfast: bran flakes - tries to eat breakfast each morning, but sometimes skips  Lunch: sandwich and a lot of pretzels- states she is really tired today  Dinner:  donald mooney from grocery store  Beverages: water  Exercise: not discussed today  Nutrition Diagnosis:    Overweight/Obesity (NC 3.3)  related to overeating and poor lifestyle habits as evidenced by patient's report of limited budget, inconsistent meals, large portions, frequent snacking of sweets, lack of activity and BMI 40.74    Intervention:  4. Food and/or nutrient delivery: continue to aim for 3 meals per day, working on meal planning each week  5. Nutrition counseling: goal setting    Monitoring/Evaluation:    Goals:  1.  Plan and budget meals for the week  2. Eat breakfast daily/3 meals per day  3. Continue to increase veggies    Patient to follow up in 3 month(s) with bariatrician and 3 month(s) with RD    Video-Visit Details    Type of service:  Video Visit    Video Start Time (time video started): 9:59 am    Video End Time (time video stopped): 10:15 am    Originating Location (pt. Location): Home        Distant Location (provider location):  On-site    Mode of Communication:  Video Conference via [facility]    Physician has received verbal consent for a Video Visit from the patient? Yes      Angeli Domingo          Again, thank you for allowing me to participate in the care of your patient.        Sincerely,        Angeli Domingo

## 2022-12-06 ENCOUNTER — HOSPITAL ENCOUNTER (OUTPATIENT)
Dept: RESPIRATORY THERAPY | Facility: CLINIC | Age: 37
Discharge: HOME | End: 2022-12-06
Attending: NURSE PRACTITIONER | Admitting: NURSE PRACTITIONER
Payer: MEDICARE

## 2022-12-06 DIAGNOSIS — R06.02 SHORTNESS OF BREATH: ICD-10-CM

## 2022-12-06 PROCEDURE — 94060 EVALUATION OF WHEEZING: CPT

## 2022-12-06 PROCEDURE — 250N000009 HC RX 250: Performed by: ALLERGY & IMMUNOLOGY

## 2022-12-06 RX ADMIN — ALBUTEROL SULFATE 2.5 MG: 2.5 SOLUTION RESPIRATORY (INHALATION) at 08:30

## 2022-12-07 LAB
EXPTIME-PRE: 20.29 SEC
FEF2575-%PRED-PRE: 84 %
FEF2575-PRE: 2.66 L/SEC
FEF2575-PRED: 3.16 L/SEC
FEFMAX-%PRED-PRE: 58 %
FEFMAX-PRE: 4.21 L/SEC
FEFMAX-PRED: 7.15 L/SEC
FEV1-%PRED-PRE: 70 %
FEV1-PRE: 2.08 L
FEV1FEV6-PRE: 88 %
FEV1FEV6-PRED: 84 %
FEV1FVC-PRE: 88 %
FEV1FVC-PRED: 84 %
FIFMAX-PRE: 2.06 L/SEC
FVC-%PRED-PRE: 66 %
FVC-PRE: 2.37 L
FVC-PRED: 3.54 L

## 2022-12-07 RX ORDER — ALBUTEROL SULFATE 0.83 MG/ML
2.5 SOLUTION RESPIRATORY (INHALATION) ONCE
Status: COMPLETED | OUTPATIENT
Start: 2022-12-06 | End: 2022-12-06

## 2022-12-15 DIAGNOSIS — R06.02 SHORTNESS OF BREATH: Primary | ICD-10-CM

## 2022-12-15 NOTE — RESULT ENCOUNTER NOTE
POSLavu message sent:   As far as I can see, the test was supposed to be rescheduled due to the neck and back pain.  - Let me reorder a full pulmonary function test.

## 2022-12-21 ENCOUNTER — TRANSFERRED RECORDS (OUTPATIENT)
Dept: HEALTH INFORMATION MANAGEMENT | Facility: CLINIC | Age: 37
End: 2022-12-21

## 2022-12-31 ENCOUNTER — HOSPITAL ENCOUNTER (OUTPATIENT)
Facility: CLINIC | Age: 37
Setting detail: OBSERVATION
Discharge: HOME | End: 2023-01-01
Attending: EMERGENCY MEDICINE | Admitting: EMERGENCY MEDICINE
Payer: MEDICARE

## 2022-12-31 DIAGNOSIS — Z20.822 CONTACT WITH AND (SUSPECTED) EXPOSURE TO COVID-19: ICD-10-CM

## 2022-12-31 DIAGNOSIS — F41.9 ANXIETY: ICD-10-CM

## 2022-12-31 DIAGNOSIS — F25.0 SCHIZOAFFECTIVE DISORDER, BIPOLAR TYPE (H): ICD-10-CM

## 2022-12-31 LAB
AMPHETAMINES UR QL SCN: NORMAL
BARBITURATES UR QL: NORMAL
BENZODIAZ UR QL: NORMAL
CANNABINOIDS UR QL SCN: NORMAL
COCAINE UR QL: NORMAL
HCG UR QL: NEGATIVE
OPIATES UR QL SCN: NORMAL
SARS-COV-2 RNA RESP QL NAA+PROBE: NEGATIVE

## 2022-12-31 PROCEDURE — 80307 DRUG TEST PRSMV CHEM ANLYZR: CPT | Performed by: EMERGENCY MEDICINE

## 2022-12-31 PROCEDURE — 250N000013 HC RX MED GY IP 250 OP 250 PS 637: Performed by: EMERGENCY MEDICINE

## 2022-12-31 PROCEDURE — 90791 PSYCH DIAGNOSTIC EVALUATION: CPT

## 2022-12-31 PROCEDURE — G0378 HOSPITAL OBSERVATION PER HR: HCPCS

## 2022-12-31 PROCEDURE — 99220 PR INITIAL OBSERVATION CARE,LEVEL III: CPT | Performed by: EMERGENCY MEDICINE

## 2022-12-31 PROCEDURE — 99285 EMERGENCY DEPT VISIT HI MDM: CPT | Mod: 25 | Performed by: EMERGENCY MEDICINE

## 2022-12-31 PROCEDURE — U0005 INFEC AGEN DETEC AMPLI PROBE: HCPCS | Performed by: EMERGENCY MEDICINE

## 2022-12-31 PROCEDURE — C9803 HOPD COVID-19 SPEC COLLECT: HCPCS | Performed by: EMERGENCY MEDICINE

## 2022-12-31 PROCEDURE — 81025 URINE PREGNANCY TEST: CPT | Performed by: EMERGENCY MEDICINE

## 2022-12-31 RX ORDER — FLUTICASONE FUROATE AND VILANTEROL 100; 25 UG/1; UG/1
1 POWDER RESPIRATORY (INHALATION) DAILY
Status: DISCONTINUED | OUTPATIENT
Start: 2023-01-01 | End: 2023-01-01 | Stop reason: HOSPADM

## 2022-12-31 RX ORDER — LEVOTHYROXINE SODIUM 50 UG/1
50 TABLET ORAL
Status: DISCONTINUED | OUTPATIENT
Start: 2023-01-01 | End: 2023-01-01 | Stop reason: HOSPADM

## 2022-12-31 RX ORDER — PAROXETINE 20 MG/1
1 TABLET, FILM COATED ORAL DAILY
COMMUNITY
Start: 2022-12-27 | End: 2023-02-16

## 2022-12-31 RX ORDER — IBUPROFEN 200 MG
200 TABLET ORAL EVERY 4 HOURS PRN
COMMUNITY

## 2022-12-31 RX ORDER — LIRAGLUTIDE 6 MG/ML
1.8 INJECTION SUBCUTANEOUS DAILY
Status: DISCONTINUED | OUTPATIENT
Start: 2023-01-01 | End: 2023-01-01 | Stop reason: HOSPADM

## 2022-12-31 RX ORDER — LITHIUM CARBONATE 300 MG/1
300 TABLET, FILM COATED, EXTENDED RELEASE ORAL DAILY
Status: DISCONTINUED | OUTPATIENT
Start: 2023-01-01 | End: 2022-12-31

## 2022-12-31 RX ORDER — HYDROXYZINE HYDROCHLORIDE 50 MG/1
50 TABLET, FILM COATED ORAL AT BEDTIME
Status: DISCONTINUED | OUTPATIENT
Start: 2022-12-31 | End: 2023-01-01 | Stop reason: HOSPADM

## 2022-12-31 RX ORDER — LAMOTRIGINE 200 MG/1
200 TABLET ORAL AT BEDTIME
Status: DISCONTINUED | OUTPATIENT
Start: 2022-12-31 | End: 2023-01-01 | Stop reason: HOSPADM

## 2022-12-31 RX ORDER — FLUTICASONE PROPIONATE 50 MCG
2 SPRAY, SUSPENSION (ML) NASAL DAILY
Status: DISCONTINUED | OUTPATIENT
Start: 2023-01-01 | End: 2023-01-01 | Stop reason: HOSPADM

## 2022-12-31 RX ORDER — LAMOTRIGINE 100 MG/1
100 TABLET ORAL DAILY
Status: DISCONTINUED | OUTPATIENT
Start: 2023-01-01 | End: 2023-01-01 | Stop reason: HOSPADM

## 2022-12-31 RX ORDER — OLANZAPINE 5 MG/1
5 TABLET ORAL AT BEDTIME
Status: DISCONTINUED | OUTPATIENT
Start: 2022-12-31 | End: 2023-01-01 | Stop reason: HOSPADM

## 2022-12-31 RX ADMIN — HYDROXYZINE HYDROCHLORIDE 50 MG: 50 TABLET, FILM COATED ORAL at 22:29

## 2022-12-31 RX ADMIN — OLANZAPINE 5 MG: 5 TABLET, FILM COATED ORAL at 22:29

## 2022-12-31 RX ADMIN — LAMOTRIGINE 200 MG: 200 TABLET ORAL at 22:26

## 2022-12-31 ASSESSMENT — COLUMBIA-SUICIDE SEVERITY RATING SCALE - C-SSRS
ATTEMPT SINCE LAST CONTACT: NO
6. HAVE YOU EVER DONE ANYTHING, STARTED TO DO ANYTHING, OR PREPARED TO DO ANYTHING TO END YOUR LIFE?: NO
1. SINCE LAST CONTACT, HAVE YOU WISHED YOU WERE DEAD OR WISHED YOU COULD GO TO SLEEP AND NOT WAKE UP?: NO
2. HAVE YOU ACTUALLY HAD ANY THOUGHTS OF KILLING YOURSELF?: NO
TOTAL  NUMBER OF ABORTED OR SELF INTERRUPTED ATTEMPTS LIFETIME: NO
LETHALITY/MEDICAL DAMAGE CODE MOST RECENT ACTUAL ATTEMPT: MODERATE PHYSICAL DAMAGE, MEDICAL ATTENTION NEEDED
LETHALITY/MEDICAL DAMAGE CODE FIRST POTENTIAL ATTEMPT: BEHAVIOR LIKELY TO RESULT IN INJURY BUT NOT LIKELY TO CAUSE DEATH
ATTEMPT LIFETIME: YES
LETHALITY/MEDICAL DAMAGE CODE MOST RECENT POTENTIAL ATTEMPT: BEHAVIOR NOT LIKELY TO RESULT IN INJURY
SUICIDE, SINCE LAST CONTACT: NO
1. HAVE YOU WISHED YOU WERE DEAD OR WISHED YOU COULD GO TO SLEEP AND NOT WAKE UP?: YES
MOST LETHAL DATE: 58074
FIRST ATTEMPT DATE: 58074
TOTAL  NUMBER OF INTERRUPTED ATTEMPTS LIFETIME: NO
REASONS FOR IDEATION PAST MONTH: DOES NOT APPLY
LETHALITY/MEDICAL DAMAGE CODE MOST LETHAL POTENTIAL ATTEMPT: BEHAVIOR LIKELY TO RESULT IN INJURY BUT NOT LIKELY TO CAUSE DEATH
TOTAL  NUMBER OF ACTUAL ATTEMPTS LIFETIME: 3
6. HAVE YOU EVER DONE ANYTHING, STARTED TO DO ANYTHING, OR PREPARED TO DO ANYTHING TO END YOUR LIFE?: YES
LETHALITY/MEDICAL DAMAGE CODE MOST LETHAL ACTUAL ATTEMPT: MODERATE PHYSICAL DAMAGE, MEDICAL ATTENTION NEEDED
1. IN THE PAST MONTH, HAVE YOU WISHED YOU WERE DEAD OR WISHED YOU COULD GO TO SLEEP AND NOT WAKE UP?: NO
ATTEMPT PAST THREE MONTHS: NO
TOTAL  NUMBER OF INTERRUPTED ATTEMPTS SINCE LAST CONTACT: NO
6. HAVE YOU EVER DONE ANYTHING, STARTED TO DO ANYTHING, OR PREPARED TO DO ANYTHING TO END YOUR LIFE?: NO
REASONS FOR IDEATION LIFETIME: MOSTLY TO END OR STOP THE PAIN (YOU COULDN'T GO ON LIVING WITH THE PAIN OR HOW YOU WERE FEELING)
2. HAVE YOU ACTUALLY HAD ANY THOUGHTS OF KILLING YOURSELF?: NO
LETHALITY/MEDICAL DAMAGE CODE FIRST ACTUAL ATTEMPT: MODERATE PHYSICAL DAMAGE, MEDICAL ATTENTION NEEDED
TOTAL  NUMBER OF ABORTED OR SELF INTERRUPTED ATTEMPTS SINCE LAST CONTACT: NO
MOST RECENT DATE: 58074

## 2022-12-31 ASSESSMENT — ENCOUNTER SYMPTOMS
FEVER: 0
HEADACHES: 0
SHORTNESS OF BREATH: 0
TROUBLE SWALLOWING: 0
ABDOMINAL PAIN: 0
DYSPHORIC MOOD: 1
DIFFICULTY URINATING: 0
VOMITING: 0
NERVOUS/ANXIOUS: 0
NAUSEA: 0
CHILLS: 0
BACK PAIN: 0
HALLUCINATIONS: 0
NECK PAIN: 0

## 2022-12-31 ASSESSMENT — ACTIVITIES OF DAILY LIVING (ADL)
ADLS_ACUITY_SCORE: 35
ADLS_ACUITY_SCORE: 33
ADLS_ACUITY_SCORE: 35

## 2023-01-01 VITALS
TEMPERATURE: 97.5 F | DIASTOLIC BLOOD PRESSURE: 89 MMHG | OXYGEN SATURATION: 95 % | HEART RATE: 72 BPM | SYSTOLIC BLOOD PRESSURE: 128 MMHG | RESPIRATION RATE: 16 BRPM

## 2023-01-01 PROCEDURE — 99238 HOSP IP/OBS DSCHRG MGMT 30/<: CPT | Performed by: EMERGENCY MEDICINE

## 2023-01-01 PROCEDURE — 250N000009 HC RX 250: Performed by: EMERGENCY MEDICINE

## 2023-01-01 PROCEDURE — G0378 HOSPITAL OBSERVATION PER HR: HCPCS

## 2023-01-01 PROCEDURE — 250N000013 HC RX MED GY IP 250 OP 250 PS 637: Performed by: EMERGENCY MEDICINE

## 2023-01-01 PROCEDURE — 96372 THER/PROPH/DIAG INJ SC/IM: CPT

## 2023-01-01 RX ADMIN — LIRAGLUTIDE 1.8 MG: 6 INJECTION SUBCUTANEOUS at 08:40

## 2023-01-01 RX ADMIN — LEVOTHYROXINE SODIUM 50 MCG: 50 TABLET ORAL at 08:40

## 2023-01-01 RX ADMIN — LAMOTRIGINE 100 MG: 100 TABLET ORAL at 08:39

## 2023-01-01 ASSESSMENT — ACTIVITIES OF DAILY LIVING (ADL)
ADLS_ACUITY_SCORE: 35

## 2023-01-01 NOTE — PROGRESS NOTES
[facility] Crisis Reassessment      Maddy Ortiz was reassessed at the request of Dr. Fu for the following reasons: Plan was for reassessment and patient expressed her desire to return home with a referral for day treatment. Pt was first seen on 12/31/2023 by Susan Arita; see the initial assessment note for details.      Patient Presentation    Initial ED presentation details:  Patient is presenting to the ED for the following concerns: increased paranoia, delusions and concerns about emotional pain and people trying to break into her apartment.  Patient called the crisis line and then called [facility] for a ride to this facility, but learned that they were closed, so then she called the police and told them that she needs to come to ED due to a mental health break-down.  Patient denied SI/HI plan/intent and denied AH and VH, but endorsed significant paranoia with a disorganized thought process.  Patient denied alcohol/drugs.  Patient was cooperative with this  and had tangential and hyper-verbal speech.  She seemed anxious and was perseverating about several different subjects and events in her life.    Current patient presentation: Patient is eager to return home and denied SI/HI plan/intent and her thought process seems more clear and void of paranoia.  Patient in agreement to cooperate with a referral for day treatment services to improve her coping skills.      Changes observed since initial assessment: Patient presented this a.m. with more logical thought process and clear of paranoia.        Risk of Harm  [place] Suicide Severity Rating Scale Full Clinical Version:  Suicidal Ideation  1. Wish to be Dead (Lifetime): Yes  Wish to be Dead Description (Lifetime): yes - at age 15  1. Wish to be Dead (Past 1 Month): No  2. Non-Specific Active Suicidal Thoughts (Lifetime): No  Intensity of Ideation  Most Severe Ideation Rating (Lifetime): 1  Description of Most Severe Ideation (Lifetime): 3  Most Severe  Ideation Rating (Past 1 Month): 1  Description of Most Severe Ideation (Past 1 Month): 1  Frequency (Lifetime): 2-5 times in week  Frequency (Past 1 Month): Less than once a week  Duration (Lifetime): Fleeting, few seconds or minutes  Duration (Past 1 Month): Fleeting, few seconds or minutes  Controllability (Lifetime): Can control thoughts with little difficulty  Controllability (Past 1 Month): Easily able to control thoughts  Deterrents (Lifetime): Deterrents definitely stopped you from attempting suicide  Deterrents (Past 1 Month): Does not apply  Reasons for Ideation (Lifetime): Mostly to end or stop the pain (You couldn't go on living with the pain or how you were feeling)  Reasons for Ideation (Past 1 Month): Does not apply  Suicidal Behavior  Actual Attempt (Lifetime): Yes  Total Number of Actual Attempts (Lifetime): 3  Actual Attempt Description (Lifetime): 3  Actual Attempt (Past 3 Months): No  Has subject engaged in non-suicidal self-injurious behavior? (Lifetime): Yes  Has subject engaged in non-suicidal self-injurious behavior? (Past 3 Months): No  Interrupted Attempts (Lifetime): No  Aborted or Self-Interrupted Attempt (Lifetime): No  Preparatory Acts or Behavior (Lifetime): Yes  Total Number of Preparatory Acts (Lifetime):  (at age 15)  Preparatory Acts or Behavior (Past 3 Months): No  C-SSRS Risk (Lifetime/Recent)  Calculated C-SSRS Risk Score (Lifetime/Recent): Moderate Risk    [place] Suicide Severity Rating Scale Since Last Contact: 1/1/2023  Suicidal Ideation (Since Last Contact)  1. Wish to be Dead (Since Last Contact): No  2. Non-Specific Active Suicidal Thoughts (Since Last Contact): No  Suicidal Behavior (Since Last Contact)  Actual Attempt (Since Last Contact): No  Has subject engaged in non-suicidal self-injurious behavior? (Since Last Contact): No  Interrupted Attempts (Since Last Contact): No  Aborted or Self-Interrupted Attempt (Since Last Contact): No  Preparatory Acts or Behavior  (Since Last Contact): No  Suicide (Since Last Contact): No  Actual/Potential Lethality (Most Lethal Attempt)  Most Lethal Attempt Date: 01/01/00  Actual Lethality/Medical Damage Code (Most Lethal Attempt): Moderate physical damage, medical attention needed  Potential Lethality Code (Most Lethal Attempt): Behavior likely to result in injury but not likely to cause death  C-SSRS Risk (Since Last Contact)  Calculated C-SSRS Risk Score (Since Last Contact): No Risk Indicated      Validity of evaluation is impacted by presenting factors during interview - patient's disorganized thought process.   Comments regarding subjective versus objective responses to [place] tool: See evaluation.  Environmental or Psychosocial Events: public humiliation, bullied/abused, work or task failure, challenging interpersonal relationships, geographic isolation from supports, unemployment/underemployment, other life stressors, neither working nor attending school and social isolation  Chronic Risk Factors: history of psychiatric hospitalization, history of abuse or neglect, chronic health problems, history of adoption, history of attachment issues and serious, persistent mental illness   Warning Signs: hopelessness, anxiety, agitation, unable to sleep, sleeping all the time and dramatic changes in mood  Protective Factors: responsibilities and duties to others, including pets and children, good treatment engagement, supportive ongoing medical and mental health care relationships and good problem-solving, coping, and conflict resolution skills  Interpretation of Risk Scoring, Risk Mitigation Interventions and Safety Plan:  Patient may benefit from staying in hospital under OBS status and will discharge with a plan to enroll in a day treatment program for more assistance with coping skills and possible CBT.      Does the patient have thoughts of harming others? No    Mental Status Exam   Affect: Appropriate   Appearance: Appropriate     Attention Span/Concentration: Attentive?    Eye Contact: Avoidant   Fund of Knowledge: Appropriate    Language /Speech Content: Fluent   Language /Speech Volume: Loud    Language /Speech Rate/Productions: Hyperverbal    Recent Memory: Intact   Remote Memory: Intact   Mood: Anxious    Orientation to Person: Yes    Orientation to Place: Yes   Orientation to Time of Day: Yes    Orientation to Date: Yes    Situation (Do they understand why they are here?): Yes    Psychomotor Behavior: Normal    Thought Content: Clear   Thought Form: Obsessive/Perseverative       Additional Collateral Information     Patient in agreement with outpatient day treatment referral and will continue working with her mental health treatment team.      Therapeutic Intervention  The following therapeutic methodologies were employed when working with the patient: Establishing rapport, Active listening, Assess dimensions of crisis, Apply solution-focused therapy to address current crisis, Identify additional supports and alternative coping skills, Establish a discharge plan, Motivational Interviewing, Brief Supportive Therapy, Trauma-Informed Care and Safety planning. Patient response to intervention: receptive.    Diagnosis:   F25.0 Schizoaffective disorder, bipolar type  F41.8 Other specified anxiety disorder     Clinical Substantiation of Recommendations  As noted in the initial assessment, patient was accompanied to ED by EMS due to paranoia and scattered thought process.  Patient was worried about her feelings and wanted to access services to help her calm down.  Patient continues to deny SI/HI/ plan/intent and does not endorse psychosis.  She seems less paranoid today and she expressed that she is safe to discharge.  Patient has community mental health providers in place and agrees to cooperate with day treatment to improve her coping skills.    Plan:    Disposition  Recommended disposition: Programmatic Care: Day treatment        Reviewed  case and recommendations with attending provider. Attending Name: Dr. Fu      Attending concurs with disposition: Yes      Patient concurs with disposition: Yes      Final disposition: Programmatic care: Day treatment.         Assessment Details  Total duration spent on the patient case in minutes: .75 hrs     CPT code(s) utilized: 99033 - Psychotherapy (with patient) - 30 (16-37*) min       SABRA Morillo, [facility]  Callback: [phone]

## 2023-01-01 NOTE — DISCHARGE INSTRUCTIONS
[facility] SCHEDULING:  Today you were seen by a licensed mental health professional through [facility] and Transition sevices, [facility] Providers ([facility])  for a crisis assessment in the Emergency Department at [facility].  It is recommended that you follow up with your estabished providers (psychiatrist, mental health therapist, and/or primary care doctor - as relevant) as soon as possible. Coordinators from [facility] will be calling you in the next 24-48 hours to ensure that you have the resources you need.  You can also contact [facility] coordinators directly at [phone].    You have been scheduled the following appointments:  01/09/2023  at  2:00PM   VIDEO VISIT   ADULT MH EDUARD  with JOSE F GROVER      [facility] maintains an extensive network of licensed behavioral health providers to connect patients with the services they need.  We do not charge providers a fee to participate in our referral network.  We match patients with providers based on a patient s specific needs, insurance coverage, and location.  Our first effort will be to refer you to a provider within your care system, and will utilize providers outside your care system as needed.         Aftercare Plan  If I am feeling unsafe or I am in a crisis, I will:   Contact my established care providers   Call the National Suicide Prevention Lifeline: 988  Go to the nearest emergency room   Call 911     Warning signs that I or other people might notice when a crisis is developing for me:     Things I am able to do on my own to cope or help me feel better: Utilize coping skills and engage in mindfulness activities.     Things that I am able to do with others to cope or help me better: Try to exercise daily.     Things I can use or do for distraction: Listen to music, read a book, magazine.     Changes I can make to support my mental health and wellness: Eat a healthy diet, exercise and get adequate sleep.     People in my life that I can ask for help: Family,  "therapist, and crisis team     Your [facility] has a mental health crisis team you can call 24/7: [facility]  [phone]    Other things that are important when I'm in crisis: Take a deep breath and contact crisis team.     Additional resources and information: Day treatment referral - you will receive email for assessment.        Crisis Lines  Crisis Text Line  Text 388293  You will be connected with a trained live crisis counselor to provide support.    Por espanol, texto  JANINA a 027580 o texto a 442-AYUDAME en WhatsApp    [employer] (LGBTQ Youth Crisis Line)  4.196.419.2718  text START to 145-768      Community Resources  Fast Tracker  Linking people to mental health and substance use disorder resources  aiHit.[place] [facility] [place]     [place] Mental Health Warm Line  Peer to peer support  Monday thru Saturday, 12 pm to 10 pm  025.881.4457 or 8.778.157.2186  Text \"Support\" to 84325    National [place] on Mental Illness (PARESH)  975.826.3413 or 1.888.PARESH.HELPS      Mental Health Apps  My3  https://Diagnosiapp.org/    VirtualHopeBox  https://Plazapoints ([place])org/apps/virtual-hope-box/      Additional Information  Today you were seen by a licensed mental health professional through Triage and Transition services, Behavior[facility] Healthcare Providers ([facility])  for a crisis assessment in the Emergency Department at [facility].  It is recommended that you follow up with your established providers (psychiatrist, mental health therapist, and/or primary care doctor - as relevant) as soon as possible. Coordinators from [facility] will be calling you in the next 24-48 hours to ensure that you have the resources you need.  You can also contact [facility] coordinators directly at [phone]. You may have been scheduled for or offered an appointment with a mental health provider. [facility] maintains an extensive network of licensed behavioral health providers to connect patients with the services they need.  We do not " charge providers a fee to participate in our referral network.  We match patients with providers based on a patient's specific needs, insurance coverage, and location.  Our first effort will be to refer you to a provider within your care system, and will utilize providers outside your care system as needed.

## 2023-01-01 NOTE — ED NOTES
ED Observation Discharge Summary  [facility]  Discharge Date: 1/1/2023    Maddy Ortiz MRN: [id number]   Age: 37 year old YOB: 1985     Brief HPI & Initial ED Course     Chief Complaint   Patient presents with     Psychiatric Evaluation     Pt called EMS, referred for admission, increasing anxiety and depression, cooperative in route.     HPI  Maddy Ortiz is a 37 year old female with PMH notable for schizoaffective disorder who presented to the ED with a psychiatric concern. Feeling anxious and disorganized. Without SI/HI or self injury.      The patient was evaluated in the emergency department by a physician and DEC behavioral health . The DEC  recommended observation overnight and reassessment in the morning.. See separate DEC note from this encounter for details on the assessment. The patient's psychiatric state was such that she would benefit from ongoing monitoring. Observation care was initiated with the plan including serial assessments of psychiatric condition, potential administration of medications if indicated, and further disposition pending the patient's psychiatric course during the monitoring period.     See ED Observation H&P for further details on the patient's presenting history and initial evaluation.     Physical Exam   BP: (!) 144/98  Pulse: 83  Temp: 98.4  F (36.9  C)  Resp: 16  SpO2: 98 %    Physical Exam  General: Appears stated age.   HENT: MMM, no oropharyngeal lesions  Eyes: PERRL, normal sclerae   Cardio: Regular rate, extremities well perfused  Resp: Normal work of breathing, normal respiratory rate  Neuro: alert and fully oriented. CN II-XII grossly intact. Grossly normal strength and sensation in all extremities.   MSK: no deformities.   Integumentary/Skin: no rash visualized, normal color  Psych: Unusual affect, calm and cooperative behavior. No SI. No HI. No hallucinations. Thought process linear and future directed, has  hopes to connect with her daughter and be an engaged mother. Insight good.     Results     ED Course as of 01/01/23 1008   Sat Dec 31, 2022   0377 Patient reassessed.  She is resting comfortably.  No medical complaints at this time.  She is agreeable to spend the night in the ED under observation status with plan for reassessment in the morning.          Labs Ordered and Resulted from Time of ED Arrival to Time of ED Departure   COVID-19 VIRUS (CORONAVIRUS) BY PCR - Normal       Result Value    SARS CoV2 PCR Negative     HCG QUALITATIVE URINE - Normal    hCG Urine Qualitative Negative     DRUG ABUSE SCREEN 1 URINE (ED) - Normal    Amphetamines Urine Screen Negative      Barbiturates Urine Screen Negative      Benzodiazepines Urine Screen Negative      Cannabinoids Urine Screen Negative      Cocaine Urine Screen Negative      Opiates Urine Screen Negative              Observation Course   The patient was found to have a psychiatric condition that would benefit from an observation stay in the emergency department for further psychiatric stabilization and/or coordination of a safe disposition. The plan upon observation admission included serial assessments of psychiatric condition, potential administration of medications if indicated, further disposition pending the patient's psychiatric course during the monitoring period.     Serial assessments of the patient's psychiatric condition were performed. Nursing notes were reviewed. During the observation period, the patient did not require medications for agitation, and did not require restraints/seclusion for patient and/or provider safety.        After the period in observation care, the patient's circumstances and mental state were safe for outpatient management. After counseling on the diagnosis, work-up, and treatment plan, the patient was discharged. Close follow-up with a psychiatrist and/or therapist was recommended and community psychiatric resources were  provided. [facility] working on scheduling patient for a new Day Treatment program. Patient is to return to the ED if any urgent or potentially life-threatening concerns.      Discharge Diagnoses:   Final diagnoses:   Anxiety       --  Marline Fu MD  [facility] EMERGENCY DEPARTMENT  1/1/2023      Marline Fu MD  01/01/23 1009
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
## 2023-01-01 NOTE — ED NOTES
Bed: URE-A  Expected date: 12/31/22  Expected time: 6:42 PM  Means of arrival:   Comments:  M health: 38 y/o, F, Mental Health
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
## 2023-01-03 ENCOUNTER — PATIENT OUTREACH (OUTPATIENT)
Dept: CARE COORDINATION | Facility: CLINIC | Age: 38
End: 2023-01-03

## 2023-01-03 NOTE — PROGRESS NOTES
Clinic Care Coordination Contact  [facility]/Voicemail       Clinical Data: Care Coordinator Outreach  Outreach attempted x 2.  Left message on patient's voicemail with call back information and requested return call.    Plan: Care Coordinator will do no further outreaches at this time.    TEREZA Norwood  Connected Care Resource Center  [facility]     *Connected Care Resource Team does NOT follow patient ongoing. Referrals are identified based on internal discharge reports and the outreach is to ensure patient has an understanding of their discharge instructions.

## 2023-01-09 ENCOUNTER — TELEPHONE (OUTPATIENT)
Dept: BEHAVIORAL HEALTH | Facility: CLINIC | Age: 38
End: 2023-01-09

## 2023-01-09 NOTE — TELEPHONE ENCOUNTER
Unable to get a hold of patient with second call. Writer left a second voicemail with writer's call back number. Writer included in vm that latest time to check in is 2:15pm. Writer also included Intake's number to reschedule if needed. Will inform patient's .

## 2023-01-09 NOTE — TELEPHONE ENCOUNTER
Patient have a video appointment today at 2pm with [facility]. Writer placed a call this morning to check in. Unable to get a hold of patient. Writer left a voicemail with writer's call back number.

## 2023-01-13 ENCOUNTER — VIRTUAL VISIT (OUTPATIENT)
Dept: EDUCATION SERVICES | Facility: CLINIC | Age: 38
End: 2023-01-13
Payer: COMMERCIAL

## 2023-01-13 DIAGNOSIS — E11.9 TYPE 2 DIABETES MELLITUS WITHOUT COMPLICATION, WITHOUT LONG-TERM CURRENT USE OF INSULIN (H): Primary | ICD-10-CM

## 2023-01-13 PROCEDURE — 98967 PH1 ASSMT&MGMT NQHP 11-20: CPT | Mod: 95 | Performed by: DIETITIAN, REGISTERED

## 2023-01-13 NOTE — PROGRESS NOTES
Diabetes Education Follow-up    Subjective/Objective:  Type of service:  Video Visit  Originating Location (pt. Location): Home  Distant Location (provider location): Home  Mode of Communication:  Video Conference via [employer]-Art        Diabetes is being managed with   Lifestyle (diet/activity), Diabetes Medications   Diabetes Medication(s)     Incretin Mimetic Agents       liraglutide (VICTOZA PEN) 18 MG/3ML solution    INJECT 1.8 MG UNDER THE SKIN ONCE DAILY        Lab Results   Component Value Date    A1C 5.4 10/14/2022    A1C 6.4 06/30/2022    A1C 6.0 06/19/2022    A1C 7.3 04/08/2022    A1C 8.4 01/08/2022    A1C 4.7 06/09/2013         Assessment:  Hasn't been checking blood sugars for a couple months.  Set goal of 1 check every 2 weeks.  Maddy will write this in her calendar and attempt this.  Ordered a new future A1c.  If not checking blood sugars would recommend an A1c every 90 days to keep a closer eye on control.  Goal is for aggressive management to halt disease process and is doing very well with last A1c in the 5s.   Maddy notes to be doing well,  started Paxil recently and is feeling better.     Diet / food notes:   Getting moms meals.  Gets 14 meals for 14 days (so an average of 1/day).  Usually enjoys them.   Lower appetite with the victoza feels to be eating enough   Eating when hungry, irregular sleep patterns so no set meal plan   Takes victoza in the morning   Beverages: water and tea.  Sometimes sugar, but usually stevia packets     Activity  Walking to the store every other day   Physical therapy exercise (Stella Blanc)   Insurance changed and now might have gym membership.  [employer]    Plan/Response:  Continue with positive diet & lifestyle changes   Maddy will make an appointment as needed and knows she can get a referral to Diabetes ed at anytime.   A1c every 90 days   Attempt 2 blood sugar checks monthly    Anna Hampton RD, TRUDY, CDCES  Diabetes Education  Time spent: 16 minutes     A copy  of this encounter was shared with the provider.

## 2023-01-13 NOTE — LETTER
1/13/2023         RE: Maddy Ortiz  [address]  [place] [address]        Dear Colleague,    Thank you for referring your patient, Maddy Ortiz, to the [facility] DIABETES [facility]UC[facility] [place]. Please see a copy of my visit note below.    Diabetes Education Follow-up    Subjective/Objective:  Type of service:  Video Visit  Originating Location (pt. Location): Home  Distant Location (provider location): Home  Mode of Communication:  Video Conference via [employer]        Diabetes is being managed with   Lifestyle (diet/activity), Diabetes Medications   Diabetes Medication(s)     Incretin Mimetic Agents       liraglutide (VICTOZA PEN) 18 MG/3ML solution    INJECT 1.8 MG UNDER THE SKIN ONCE DAILY        Lab Results   Component Value Date    A1C 5.4 10/14/2022    A1C 6.4 06/30/2022    A1C 6.0 06/19/2022    A1C 7.3 04/08/2022    A1C 8.4 01/08/2022    A1C 4.7 06/09/2013         Assessment:  Hasn't been checking blood sugars for a couple months.  Set goal of 1 check every 2 weeks.  Maddy will write this in her calendar and attempt this.  Ordered a new future A1c.  If not checking blood sugars would recommend an A1c every 90 days to keep a closer eye on control.  Goal is for aggressive management to halt disease process and is doing very well with last A1c in the 5s.   Maddy notes to be doing well,  started Paxil recently and is feeling better.     Diet / food notes:   Getting moms meals.  Gets 14 meals for 14 days (so an average of 1/day).  Usually enjoys them.   Lower appetite with the victoza feels to be eating enough   Eating when hungry, irregular sleep patterns so no set meal plan   Takes victoza in the morning   Beverages: water and tea.  Sometimes sugar, but usually stevia packets     Activity  Walking to the store every other day   Physical therapy exercise (Stella Blanc)   Insurance changed and now might have gym membership.  [employer]    Plan/Response:  Continue with positive diet &  lifestyle changes   Maddy will make an appointment as needed and knows she can get a referral to Diabetes ed at anytime.   A1c every 90 days   Attempt 2 blood sugar checks monthly    Anna Hampton RD, LD, [facility]  Diabetes Education  Time spent: 16 minutes     A copy of this encounter was shared with the provider.

## 2023-01-20 ENCOUNTER — MYC MEDICAL ADVICE (OUTPATIENT)
Dept: FAMILY MEDICINE | Facility: CLINIC | Age: 38
End: 2023-01-20
Payer: COMMERCIAL

## 2023-01-25 ENCOUNTER — TELEPHONE (OUTPATIENT)
Dept: BEHAVIORAL HEALTH | Facility: CLINIC | Age: 38
End: 2023-01-25
Payer: COMMERCIAL

## 2023-01-29 ASSESSMENT — ANXIETY QUESTIONNAIRES
3. WORRYING TOO MUCH ABOUT DIFFERENT THINGS: NEARLY EVERY DAY
6. BECOMING EASILY ANNOYED OR IRRITABLE: NEARLY EVERY DAY
5. BEING SO RESTLESS THAT IT IS HARD TO SIT STILL: NEARLY EVERY DAY
GAD7 TOTAL SCORE: 21
8. IF YOU CHECKED OFF ANY PROBLEMS, HOW DIFFICULT HAVE THESE MADE IT FOR YOU TO DO YOUR WORK, TAKE CARE OF THINGS AT HOME, OR GET ALONG WITH OTHER PEOPLE?: EXTREMELY DIFFICULT
4. TROUBLE RELAXING: NEARLY EVERY DAY
GAD7 TOTAL SCORE: 21
IF YOU CHECKED OFF ANY PROBLEMS ON THIS QUESTIONNAIRE, HOW DIFFICULT HAVE THESE PROBLEMS MADE IT FOR YOU TO DO YOUR WORK, TAKE CARE OF THINGS AT HOME, OR GET ALONG WITH OTHER PEOPLE: EXTREMELY DIFFICULT
7. FEELING AFRAID AS IF SOMETHING AWFUL MIGHT HAPPEN: NEARLY EVERY DAY
1. FEELING NERVOUS, ANXIOUS, OR ON EDGE: NEARLY EVERY DAY
7. FEELING AFRAID AS IF SOMETHING AWFUL MIGHT HAPPEN: NEARLY EVERY DAY
GAD7 TOTAL SCORE: 21
2. NOT BEING ABLE TO STOP OR CONTROL WORRYING: NEARLY EVERY DAY

## 2023-01-29 ASSESSMENT — PATIENT HEALTH QUESTIONNAIRE - PHQ9
SUM OF ALL RESPONSES TO PHQ QUESTIONS 1-9: 21
SUM OF ALL RESPONSES TO PHQ QUESTIONS 1-9: 21
10. IF YOU CHECKED OFF ANY PROBLEMS, HOW DIFFICULT HAVE THESE PROBLEMS MADE IT FOR YOU TO DO YOUR WORK, TAKE CARE OF THINGS AT HOME, OR GET ALONG WITH OTHER PEOPLE: EXTREMELY DIFFICULT

## 2023-01-30 ENCOUNTER — TELEPHONE (OUTPATIENT)
Dept: BEHAVIORAL HEALTH | Facility: CLINIC | Age: 38
End: 2023-01-30
Payer: COMMERCIAL

## 2023-01-30 ENCOUNTER — HOSPITAL ENCOUNTER (OUTPATIENT)
Dept: BEHAVIORAL HEALTH | Facility: CLINIC | Age: 38
Discharge: HOME | End: 2023-01-30
Attending: FAMILY MEDICINE | Admitting: FAMILY MEDICINE
Payer: COMMERCIAL

## 2023-01-30 PROCEDURE — 90791 PSYCH DIAGNOSTIC EVALUATION: CPT | Mod: GT | Performed by: COUNSELOR

## 2023-01-30 ASSESSMENT — COLUMBIA-SUICIDE SEVERITY RATING SCALE - C-SSRS
2. HAVE YOU ACTUALLY HAD ANY THOUGHTS OF KILLING YOURSELF IN THE PAST MONTH?: NO
6. HAVE YOU EVER DONE ANYTHING, STARTED TO DO ANYTHING, OR PREPARED TO DO ANYTHING TO END YOUR LIFE?: YES
4. HAVE YOU HAD THESE THOUGHTS AND HAD SOME INTENTION OF ACTING ON THEM?: NO
3. HAVE YOU BEEN THINKING ABOUT HOW YOU MIGHT KILL YOURSELF?: NO
1. IN THE PAST MONTH, HAVE YOU WISHED YOU WERE DEAD OR WISHED YOU COULD GO TO SLEEP AND NOT WAKE UP?: NO
5. HAVE YOU STARTED TO WORK OUT OR WORKED OUT THE DETAILS OF HOW TO KILL YOURSELF? DO YOU INTEND TO CARRY OUT THIS PLAN?: NO

## 2023-01-30 NOTE — PROGRESS NOTES
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 those appointments. Patient asked her primary care doctor to send  patient's MyChart records and journals.  looked over the documents and forwarded to the day treatment staff.     7. ALEIDA:    ALEIDA Recommendations:  NA.      8. Records were reviewed at time of assessment. Information in this assessment was obtained from the medical record and provided by patient who is a fair historian. Patient will have open access to their mental health medical record.    9. Interactive Complexity: No       Provider Name/ Credentials:  Nikki Barfield, BETSY, LICSW, LADC  January 30, 2023              Outpatient Mental Health Services - Adult    MY COPING PLAN FOR SAFETY    Name: Maddy Ortiz  YOB: 1985  Date: 1/30/23  My primary care provider: Shirley Freeman  My prescriber: Shirley at [facility]  Other care team support:  Therapist, [facility], [facility], [facility]1983 workers My Triggers:  Relationship conflict, Home environment, Financial concerns, Housing concerns and Medical Health     Additional People, Places, and Things that I can access for support: father         What is important to me and makes life worth living: \"Daughter " "and family\".       GREEN    Good Control  1. I feel good  2. No suicidal thoughts   3. Can work, sleep and play      Action Steps  1. Self-care: balanced meals, exercising, sleep practices, etc.  2. Take your medications as prescribed.  3. Continue meetings with therapist and prescriber.  4.  Do the healthy things that I enjoy.           YELLOW  Getting Worse  I have ANY of these:  1. I do not feel good  2. Difficulty Concentrating  3. Sleep is changing  4. Increase/Change in my thoughts to hurt self and/or others, but I can still manage and not act on it.   5. Not taking care of self.             Action Steps (in addition to the above):  1. Inform your therapist and psychiatric prescriber/PCP.  2. Keep taking your medications as prescribed.    3. Turn to people you can ask for help.  4. Use internal coping strategies -see below.  5. Create safe environment: lock and limit medications, notify friends/family of increase in symptoms and reduce means to other identified method           RED  Get Help  If I have ANY of these:  1. Current and uncontrollable thoughts and/or behaviors to hurt self and/or others.  Actions to manage my safety  1. Contact your emergency person: Father Jay Jay Ortiz [phone]  2. Call my crisis team- [facility] [phone]  3. Or Call 911 or go to the emergency room right away        My Internal Coping Strategies include the following:  \"writing, rest or relax, go for a walk.\"     Safety Concerns  How To Identify Situations That Make Your Mental Health Worse:  Triggers are things that make your mental health worse.  Look at the list below to help you find your triggers and what you can do about them.     1. Identify Early Warning Signs:    Sometimes symptoms return, even when people do their best to stay well. Symptoms can develop over a short period of time with little or no warning, but most of the time they emerge gradually over several weeks.  Early warning signs are changes " that people experience when a relapse is starting. Some early warning signs are common and others are not as common.   Common Early Warning Signs:    Feeling tense or nervous, Eating less or eating more, Trouble sleeping -either too much or too little sleep, Feeling depressed or low, Feeling irritable, Feeling like not being around other people, Trouble concentrating, Urges to harm self and Urges to harm others     2. Identify action steps to take when warning signs are noticed:    Taking Action- It is important to take action if you are experiencing early warning signs of a relapse.  The faster you act, the more likely it is that you can avoid a full relapse.  It is helpful to identify several specific ways to cope with symptoms.      The following is my list of symptoms and coping strategies that I can use when they are present:    Symptom Coping Strategies   Anxiety -Talk with someone in your support system and let him or her know how you are feeling.  -Use relaxation techniques such as deep breathing or imagery.  -Use positive affirmations to counteract negative self-talk such as  I am learning to let go of worry.    Depression - Schedule your day; include activities you have to do and activities you enjoy doing.  - Get some exercise - walk, run, bike, or swim.  - Give yourself credit for even the smallest things you get done.   Sleep Difficulties   - Go to sleep at the same time every day.  - Do something relaxing before bed, such as drinking herbal tea or listening to music.  - Avoid having discussions about upsetting topics before going to bed.   Delusions   - Distract yourself from the disturbing thought by doing something that requires your attention such as a puzzle.  - Check out your beliefs by talking to someone you trust.    Hallucinations   - Use headphones to listen to music.  - Tell voices to  stop  or say to yourself,  I am safe.   - Ignore the hallucinations as much as possible; focus on other  things.   Concentration Difficulties - Minimize distractions so there is only one thing for you to focus on at a time.    - Ask the person you are having a conversation with to slow down or repeat things you are unsure of.

## 2023-01-30 NOTE — PATIENT INSTRUCTIONS
Welcome to [facility] Adult Mental Health Outpatient Programs    Thank you for choosing [facility]!    Congratulations! You have completed the first step in your recovery by participating in a Diagnostic Assessment. With your input, SABRA Landry, [facility], is recommending the following level of care and services to best meet your needs.    Managing mental health symptoms while balancing life stressors can be difficult. Our mental health programming will provide the group therapy, education, skills, and support needed to improve your well-being while living a healthy and manageable lifestyle.    All our Adult Mental Health Outpatient Programs are group-based and allow you to meet with peers who are trying to manage similar symptoms and/or life circumstances in a safe and therapeutic setting.    Recommendations and Plan:    Level of Care:  Combined Intensive Outpatient Program - Adult Day Treatment  (IOP-ADT) [phone]    Start Date:  February / 02 / 2022    Schedule:  (6B track) Monday, Tuesday, Thursday @ 1 PM to 3:50 PM    If you were placed on a Wait List following your Diagnostic Assessment, please expect the following:  You will receive a phone call from the program within a few days to discuss a start date and plan.    Please contact the program at the number listed above if you are choosing to be removed from the Wait List, need to reschedule your start or if you have any additional questions.    Type of Participation:  Virtual     We are currently providing 100% online group-based programming. It is a requirement that you be physically in the State of [place] when accessing services. Our providers must be licensed in the state you are located in.      To provide the best group experience for everyone, we expect confidentiality, regular attendance, and respectful participation by all.      Cameras need to be on during groups. We want to see you!   Be sure to be in a private place where others  will not overhear or walk in. Using headphones and making sure your screen is not visible to others are steps you can take to ensure confidentiality.   What is said in group, stays in group. (All personal or identifying information shared in group should be kept confidential and not shared with anyone).  NOTE: Audio or Visual recordings are not allowed and may result in immediate discharge from the program.  Zoom may automatically show your first and last name unless you change it when logging into group. We encourage you to change your name to your first or preferred name. You may also include preferred pronouns.   Please be sitting upright in a comfortable position. This will maximize engagement and participation for everyone.   Please refrain from smoking, eating, driving, or engaging in other distracting activities during groups.  Facilitators may provide reminders of the above expectations during group as needed.    If your camera is off and you are not responding to prompts, facilitators will assume you have left and place you in the waiting room. You will need to request to return to group.    Please do NOT cancel your appointments through [employer].  If you are going to be absent, please contact the program number and leave a message so staff will not expect you.   You will NOT be billed for any sessions you do not attend.    See additional attendance and program participation information below.     Accessing Virtual Groups:    The best way to attend groups is through your [employer] account. Please ask staff if you need assistance setting up an account. [employer] HELPLINE: [phone] or [employer] - Login Page ([email or web address]).  You will also need to download Zoom Download for Windows - Zoom to your computer (preferred), phone or iPad/Tablet devise. It is NOT necessary to log into or set up a Zoom account.  Log into My Chart each day before group.   Go to the  Visits  tab and select the current date.    You will be  asked to verify personal information on the first day or for longer programs, every 30 days. Please allow extra time on your first day to complete this. You will also be asked to complete assessments regularly so we can monitor your progress and address concerns.    The daily invite through [employer] expires 15 minutes after group starts.   You will need to call the program number to request a link to the group if you:   log out of group once it begins   are late to group   get disconnected   are unable to access group for any reason    There is a new link created every day.    Breaks are provided between groups (10 minutes)   Do not log out.  You may mute or pause your video.   The group facilitator may put you in virtual waiting room until the next group starts.        Combined Intensive Outpatient Day Treatment Program Overview [phone]     This level of care is less intensive than an inpatient or partial hospitalization program and provides more support than traditional individual psychotherapy.      Length of Stay Typically, patients attend up to 12 weeks of programming, although this can vary depending on specific patient needs.   Treatment team Multi-disciplinary team includes a licensed psychotherapist, registered nurse, and occupational therapist. All groups are facilitated by a Licensed Mental Health professional.      Group-based programming 3 groups per day; 3 days per week  50 minutes per group  10-minute break between each group  Facilitated by a member of the treatment team    Group Psychotherapy is provided daily, allowing time to check-in, process concerns, and share feedback/support. All other groups include a topic and provide opportunities for education, skill building, discussion, and support.      Example Group Topics Topics may include:      Behavior Activation, Cognitive Restructuring: Cognitive Distortions, Communication Skills/ Areas for Self-Improvement. Coping Skills, Discharge  Planning, Dimensions of Wellness, Emotions, Forgiveness, Functional Nutrition, Grief, Life Transitions, Leisure Exploration, Life Skills, Medication Assessment, Mental Health Social Support, Mindfulness, Mood Tracking/Triggers, Motivation and Procrastination, Relationship, Relaxation Techniques, Self-Awareness, Self-Confidence, Self-Care, Self-Compassion, Shame and Guilt, Sleep hygiene, SMART Goals, Stages to [place] with Stress, Stigma, Strategies to Improve Motivation, Symptom Awareness, Time Management, Trauma Triggers, Values, Wellness       Psychiatrist and/or Psychologist             Patients will see the program psychiatrist and/or psychologist following admission to the program. Follow-up visits will be every 4 weeks or more frequent, as needed.    If seeing the psychiatrist, they will partner with you and your other providers for medication management, as needed.   Psychiatry services are billed separately.   For insurance purposes, please coordinate with team to prevent scheduling to see the program psychiatrist on the same day you see your outpatient psychiatrist.   If seeing the psychologist, they will provide individual therapy. Medication management will not be provided.     NOTE: Either provider will continue to assess your progress and coordinate with the treatment team to determine when you may be ready for completion.  This is a program requirement.       Individual Therapy   See psychologist services above.    Physical Health Screen Patients meet with a program nurse within the first week to complete a brief physical health screen. This is a program requirement.     FMLA or Short-term Disability We encourage you to request completion of paperwork from your long-term providers.   If this is not an option, please notify the program nurse as soon as possible.  We will do our best to help you coordinate completion of paperwork.   Medical Record requests: please contact Release of Information  at  [phone] and allow up to 14 days for records once the authorization for release is received.    Treatment and Discharge Planning Patients meet individually with team members for treatment planning.   We will help you develop goals and identify strengths and/or barriers.   We will discuss your program participation, progress, and discharge planning.   We are available to assist with referrals and service coordination; please let us know how best we can support your specific needs.   You will receive copies of your treatment plan and discharge summary via [employer]            An Important Note from your Program Treatment Team...    Welcome! We are happy to be partnering with you on your recovery journey. Our experienced clinicians have developed programming based on current research and evidence-based practice to provide you with high quality mental health treatment.     Attendance and Program Participation:  You will participate in a variety of groups each day. Our goal is to provide you with a rich and varied therapeutic healing environment knowing patients have unique experiences and preferred ways of sharing, learning, processing, and engaging in the recovery process.  You are expected to attend all groups on time.  If you are going to be late or absent, please let a team member know the reason. You can also leave that same information at the number listed above.  In the event of an unreported absence, we will reach out to you. If we are unable to reach you, know that we may call your emergency contact.  We always attempt to establish contact with your emergency contact prior to initiating a wellness check.  While it is important that you continue to attend appointments with your individual therapist, psychiatrist, and other medical providers, you are encouraged to schedule these appointments outside of group hours whenever possible.  If your attendance becomes a concern, your treatment team will have a  discussion with you and may start an Attendance Agreement. Following your Attendance Agreement is required to prevent early discharge from the program.  To get the most out of the program and to support your wellbeing we require that you do not use any chemicals, tobacco or vape products on screen or during program hours. The team is available to assist you if this is something you struggle with.  Please be mindful that you are part of a group; therefore, we ask that you be respectful of other group members' needs and do not use derogatory, offensive, or discriminatory language.  You will be removed from group if suspected of being under the influence of substances or if using language that negatively impacts the group.  Your treatment team will address any concerns with you and offer recommendations. A Behavior Agreement may be started. Following your Behavior Agreement is required to prevent early discharge from the program.  Communication with other group members outside of group is discouraged. This can affect your treatment and the way the group functions.  If you choose to share contact information with group peers AFTER you are discharged from the program, this decision is completely independent of any program coordination or support.  While in the program, participants may not engage in any sexual or intimate relationships with each other outside of group. This may result in immediate discharge of both participants from the program.     Trauma:  Many of our patients have experienced trauma. You are not alone. This can be challenging for patients to manage. All our team members have been trained in Trauma Informed Care and will provide you with the education, skills training, and support that you need to stabilize your symptoms.  Specific details and descriptions of abuse, assault, violence, neglect, etc., are best processed in individual therapy as to avoid triggering other group members.  Discussing how  traumatic experiences have impacted beliefs about self/others/world and practicing skills to cope with symptoms is very appropriate for group therapy.     We look forward to working together to support your mental health.     We love feedback from our patients about our program!  Please take a few moments to respond to surveys sent out by [employer].  This will help us continue to make improvements and to keep the things that are   important to you!

## 2023-01-30 NOTE — TELEPHONE ENCOUNTER
----- Message from SABRA Landry sent at 1/30/2023 12:41 PM CST -----  Regarding: schedule for 6B  Adult Mental Health Programmatic Care Schedule Request    Patient Name: Maddy Ortiz  Location of programming: [facility]  Start Date: Thursday 02/02    Group:   Adult Program Group: IOP 6B Psychosis Symptoms (mixed ages) Track  Schedule: M, T, TH 1pm-4pm  9 hours per week for 12 weeks  Number of visits to be scheduled: 4, 37 days    Attending Provider (MD):  Dr Dax Haji.  Visit Type:  Virtual     Accommodations Needed: ELFEGO  Red Flags Identified/Substantiation: ELFEGO  Consulted with Supervisor: ELFEGO

## 2023-01-31 ENCOUNTER — NURSE TRIAGE (OUTPATIENT)
Dept: NURSING | Facility: CLINIC | Age: 38
End: 2023-01-31
Payer: COMMERCIAL

## 2023-02-01 ENCOUNTER — MYC MEDICAL ADVICE (OUTPATIENT)
Dept: FAMILY MEDICINE | Facility: CLINIC | Age: 38
End: 2023-02-01

## 2023-02-01 ENCOUNTER — HOSPITAL ENCOUNTER (EMERGENCY)
Facility: CLINIC | Age: 38
Discharge: HOME | End: 2023-02-01
Attending: FAMILY MEDICINE | Admitting: FAMILY MEDICINE
Payer: COMMERCIAL

## 2023-02-01 ENCOUNTER — BEH TREATMENT PLAN (OUTPATIENT)
Dept: BEHAVIORAL HEALTH | Facility: CLINIC | Age: 38
End: 2023-02-01
Attending: PSYCHIATRY & NEUROLOGY

## 2023-02-01 VITALS
HEIGHT: 63 IN | WEIGHT: 220 LBS | TEMPERATURE: 98.3 F | SYSTOLIC BLOOD PRESSURE: 120 MMHG | OXYGEN SATURATION: 95 % | HEART RATE: 86 BPM | DIASTOLIC BLOOD PRESSURE: 83 MMHG | BODY MASS INDEX: 38.98 KG/M2

## 2023-02-01 DIAGNOSIS — L60.0 INGROWN TOENAIL OF LEFT FOOT: ICD-10-CM

## 2023-02-01 PROCEDURE — 99283 EMERGENCY DEPT VISIT LOW MDM: CPT

## 2023-02-01 PROCEDURE — 99283 EMERGENCY DEPT VISIT LOW MDM: CPT | Performed by: FAMILY MEDICINE

## 2023-02-01 ASSESSMENT — ACTIVITIES OF DAILY LIVING (ADL): ADLS_ACUITY_SCORE: 33

## 2023-02-01 NOTE — ED TRIAGE NOTES
Pt c/o ingrown toenail on left great toe, tender and painful. States is diabetic. No antibiotics currently.      Triage Assessment     Row Name 02/01/23 1017       Triage Assessment (Adult)    Airway WDL WDL       Respiratory WDL    Respiratory WDL WDL       Skin Circulation/Temperature WDL    Skin Circulation/Temperature WDL WDL       Cardiac WDL    Cardiac WDL WDL       Peripheral/Neurovascular WDL    Peripheral Neurovascular WDL WDL       Cognitive/Neuro/Behavioral WDL    Cognitive/Neuro/Behavioral WDL WDL

## 2023-02-01 NOTE — PROGRESS NOTES
RN Review of Medical History / Physical Health Screen  Outpatient Mental Health Programs - [facility] Mental Health Day Treatment    PATIENT'S NAME: Maddy Ortiz  Preferred name: Maddy   She/Her/Hers/Herself 37 year old  MRN:   [id number]  :   1985  ACCT. NUMBER: [id number]  CURRENT AGE:  37 year old    DATE OF DIAGNOSTIC ASSESSMENT: 2023  DATE OF ADMISSION: 2023     Please see Diagnostic Assessment for additional Medical History.     General Health:   Have you had any exposure to any communicable disease in the past 2-3 weeks? no     Are you aware of safe sex practices? yes   Do you have a history of seizures?     If so, do you have a seizure plan? Known triggers?     Notify patient that we will call 911 (if virtual) or a code (if in-person), if we were to witness seizure during group. no         Nutrition:    Are you on a special diet? If yes, please explain:  no   Do you have any concerns regarding your nutritional status? If yes, please explain:  Yes, diabetic   Have you had any appetite changes in the last 3 months?  No     Have you had any weight loss or weight gain in the last 3 months?  No, does not weight self     Do you have a history of an eating disorder? yes binge eating disorder   Do you have a history of being in an eating disorder program? no         Height/Weight Review:  Patient reported height:  5'3      Patient reports weight:  Date last checked:  225lbs          Patient height and weight recorded by RN in epic flowsheet: No; Unable to measure  Programmatic Care currently provided via telehealth. All pt weights and heights will be collected through patient self-report and recorded in physical health screening progress note upon admission to the program.      Fall Risk:   Have you had any falls in the past 3 months? yes Ice     Do you currently use any assistive devices for mobility?   no      Does the patient have medication concerns? no has home  RN     Was an MTM referral placed? no      Does the patient have any acute or chronic pain concerns that might impact participation in the program? no       Additional Comments/Assessment:      Per completion of the Medical History / Physical Health Screen, is there a recommendation to see / follow up with a primary care physician/clinic or dentist?    No.          Eder Ovalle RN  2/1/2023

## 2023-02-01 NOTE — TELEPHONE ENCOUNTER
Nurse Triage SBAR    Is this a 2nd Level Triage? NO    Situation: Ingrown toenail, possibly infected    Background: Extensive health history    Assessment: Cut toenails last week. Left big toe has been sore ever since and is red in corner and is still sore and patient states it is swollen and looks like the skin is peeling off. Redness is not on entire toe but is on the top and has not gotten any better. No Fever.    Protocol Recommended Disposition:   See PCP Within 3 Days Transferred to scheduling, available appointment 2/2, if unable to go to appointment writer suggested to go to Urgent Care to have toe evaluated for possible infection.        Radha Mercedes RN on 1/31/2023 at 7:12 PM         Reason for Disposition    [1] MODERATE pain (e.g., limping, interferes with normal activities) AND [2] present > 3 days    Additional Information    Negative: Doesn't match the SYMPTOMS for ingrown toenail    Negative: Patient sounds very sick or weak to the triager    Negative: [1] Looks infected (spreading redness, red streak, pus) AND [2] fever    Negative: [1] Red streaking AND [2] longer than 1 inch (2.5 cm)    Negative: [1] Skin around the nail has become red AND [2] larger than 2 inches (5 cm)    Negative: Entire toe is red    Negative: Entire toe is swollen    Negative: Yellow pus seen in skin around toenail (cuticle area), or pus seen under toenail    Protocols used: TOENAIL - INGROWN-A-

## 2023-02-01 NOTE — PROGRESS NOTES
"Outpatient Mental Health Services - Adult     MY COPING PLAN FOR SAFETY     Name: Maddy Ortiz  YOB: 1985  Date: 1/30/23  My primary care provider: Shirley Freeman  My prescriber: Shirley at [facility]  Other care team support:  Therapist, ILS, [facility] workers My Triggers:  Relationship conflict, Home environment, Financial concerns, Housing concerns and Medical Health       Additional People, Places, and Things that I can access for support: father            What is important to me and makes life worth living: \"Daughter and family\".         GREEN     Good Control  1. I feel good  2. No suicidal thoughts   3. Can work, sleep and play        Action Steps  1. Self-care: balanced meals, exercising, sleep practices, etc.  2. Take your medications as prescribed.  3. Continue meetings with therapist and prescriber.  4.  Do the healthy things that I enjoy.             YELLOW  Getting Worse  I have ANY of these:  1. I do not feel good  2. Difficulty Concentrating  3. Sleep is changing  4. Increase/Change in my thoughts to hurt self and/or others, but I can still manage and not act on it.   5. Not taking care of self.             Action Steps (in addition to the above):  1. Inform your therapist and psychiatric prescriber/PCP.  2. Keep taking your medications as prescribed.    3. Turn to people you can ask for help.  4. Use internal coping strategies -see below.  5. Create safe environment: lock and limit medications, notify friends/family of increase in symptoms and reduce means to other identified method             RED  Get Help  If I have ANY of these:  1. Current and uncontrollable thoughts and/or behaviors to hurt self and/or others.  Actions to manage my safety  1. Contact your emergency person: Father Jay Jay rOtiz [phone]  2. Call my crisis team- [facility] [phone]  3. Or Call 911 or go to the emergency room right away         My Internal Coping Strategies include the " "following:  \"writing, rest or relax, go for a walk.\"      Safety Concerns  How To Identify Situations That Make Your Mental Health Worse:  Triggers are things that make your mental health worse.  Look at the list below to help you find your triggers and what you can do about them.      1. Identify Early Warning Signs:     Sometimes symptoms return, even when people do their best to stay well. Symptoms can develop over a short period of time with little or no warning, but most of the time they emerge gradually over several weeks.  Early warning signs are changes that people experience when a relapse is starting. Some early warning signs are common and others are not as common.   Common Early Warning Signs:    Feeling tense or nervous, Eating less or eating more, Trouble sleeping -either too much or too little sleep, Feeling depressed or low, Feeling irritable, Feeling like not being around other people, Trouble concentrating, Urges to harm self and Urges to harm others                 2. Identify action steps to take when warning signs are noticed:     Taking Action- It is important to take action if you are experiencing early warning signs of a relapse.  The faster you act, the more likely it is that you can avoid a full relapse.  It is helpful to identify several specific ways to cope with symptoms.       The following is my list of symptoms and coping strategies that I can use when they are present:     Symptom Coping Strategies   Anxiety -Talk with someone in your support system and let him or her know how you are feeling.  -Use relaxation techniques such as deep breathing or imagery.  -Use positive affirmations to counteract negative self-talk such as  I am learning to let go of worry.    Depression - Schedule your day; include activities you have to do and activities you enjoy doing.  - Get some exercise - walk, run, bike, or swim.  - Give yourself credit for even the smallest things you get done.   Sleep " Difficulties    - Go to sleep at the same time every day.  - Do something relaxing before bed, such as drinking herbal tea or listening to music.  - Avoid having discussions about upsetting topics before going to bed.   Delusions    - Distract yourself from the disturbing thought by doing something that requires your attention such as a puzzle.  - Check out your beliefs by talking to someone you trust.    Hallucinations    - Use headphones to listen to music.  - Tell voices to  stop  or say to yourself,  I am safe.   - Ignore the hallucinations as much as possible; focus on other things.   Concentration Difficulties - Minimize distractions so there is only one thing for you to focus on at a time.    - Ask the person you are having a conversation with to slow down or repeat things you are unsure of.

## 2023-02-01 NOTE — ED PROVIDER NOTES
History     Chief Complaint   Patient presents with     Toe Pain     HPI  Maddy Ortiz is a 37 year old female who presents with a history of type 2 diabetes and ingrown toenail that is been painful for the last week.  There was a bulla near this a few days ago that since popped and no obvious erythema or other signs of infection.  She notes she was using a clipping scissors to take care of her nail.    Allergies:  Allergies   Allergen Reactions     Dust Mites Shortness Of Breath     Animal Dander      Other reaction(s): *Unknown     Mold      Other reaction(s): Runny Nose     Trees        Problem List:    Patient Active Problem List    Diagnosis Date Noted     mirena IUD 9/30/22 09/30/2022     Priority: Medium     Mirena IUD placed 9/30/2022  LOT# [id number]  Exp: 10/2024  NDC# 49152-707-59    Lexie Cartagena on 9/30/2022 at 1:35 PM           Overdose, undetermined intent, initial encounter 06/18/2022     Priority: Medium     Segmental and somatic dysfunction 05/10/2022     Priority: Medium     SI (sacroiliac) joint dysfunction 05/10/2022     Priority: Medium     Polypharmacy 04/25/2022     Priority: Medium     Sedated due to multiple medications 04/25/2022     Priority: Medium     Elevated LFTs 01/08/2022     Priority: Medium     Disorganized behavior 01/08/2022     Priority: Medium     Infection due to 2019 novel coronavirus 01/08/2022     Priority: Medium     Type 2 diabetes mellitus without complication, without long-term current use of insulin (H) 12/13/2021     Priority: Medium     Fatty liver 11/05/2021     Priority: Medium     Umbilical hernia without obstruction and without gangrene 10/26/2021     Priority: Medium     Schizoaffective disorder (H) 07/13/2021     Priority: Medium     Morbid obesity (H) 01/02/2019     Priority: Medium     Paranoia (psychosis) (H) 08/24/2018     Priority: Medium     Gastroesophageal reflux disease without esophagitis 06/08/2018     Priority: Medium     Schizoaffective  disorder, bipolar type (H) 05/07/2018     Priority: Medium     Encounter for IUD insertion 09/15/2017     Priority: Medium     4/24/22 bryanna insertion lot# [id number] exp-4/2023       Seasonal allergic rhinitis due to pollen 11/04/2016     Priority: Medium     Allergic rhinitis due to mold 11/04/2016     Priority: Medium     Allergic rhinitis due to animal dander 11/04/2016     Priority: Medium     Allergic rhinitis due to dust mite 11/04/2016     Priority: Medium     Depression with anxiety 01/26/2015     Priority: Medium     Insomnia 07/18/2013     Priority: Medium     Bipolar 1 disorder (H) 12/06/2012     Priority: Medium     Planning on seeing Purvi (psychiatric nurse)       Paranoid type delusional disorder (H) 11/14/2012     Priority: Medium     Psychosis (H) 10/16/2012     Priority: Medium     Animal dander allergy 05/09/2012     Priority: Medium     Dog and Cat       CARDIOVASCULAR SCREENING; LDL GOAL LESS THAN 160 05/09/2012     Priority: Medium        Past Medical History:    Past Medical History:   Diagnosis Date     Bipolar 1 disorder (H) 12/6/2012     Depressive disorder      Diabetes mellitus, type 2 (H) 12/13/2021     Paranoid type delusional disorder (H) 11/14/2012       Past Surgical History:    Past Surgical History:   Procedure Laterality Date     TONSILLECTOMY & ADENOIDECTOMY      as a child     TONSILLECTOMY & ADENOIDECTOMY         Family History:    Family History   Adopted: Yes   Problem Relation Age of Onset     Unknown/Adopted Mother      Unknown/Adopted Father      Unknown/Adopted Other      Hypertension Sister        Social History:  Marital Status:  Single [1]  Social History     Tobacco Use     Smoking status: Former     Packs/day: 0.50     Types: Cigarettes     Smokeless tobacco: Former     Tobacco comments:     around 2nd hand smoke   Vaping Use     Vaping Use: Never used   Substance Use Topics     Alcohol use: Not Currently     Drug use: Not Currently        Medications:   "  acetaminophen (TYLENOL) 325 MG tablet  albuterol (PROAIR HFA/PROVENTIL HFA/VENTOLIN HFA) 108 (90 Base) MCG/ACT inhaler  azelastine (ASTELIN) 0.1 % nasal spray  blood glucose (NO BRAND SPECIFIED) test strip  budesonide-formoterol (SYMBICORT) 80-4.5 MCG/ACT Inhaler  cetirizine (ZYRTEC) 10 MG tablet  fluticasone (FLONASE) 50 MCG/ACT nasal spray  hydrOXYzine (ATARAX) 50 MG tablet  ibuprofen (ADVIL/MOTRIN) 200 MG tablet  insulin pen needle (32G X 4 MM) 32G X 4 MM miscellaneous  lamoTRIgine (LAMICTAL) 100 MG tablet  lamoTRIgine (LAMICTAL) 200 MG tablet  levonorgestrel (MIRENA) 20 MCG/DAY IUD  levothyroxine (SYNTHROID/LEVOTHROID) 50 MCG tablet  liraglutide (VICTOZA PEN) 18 MG/3ML solution  lithium ER (LITHOBID) 300 MG CR tablet  OLANZapine (ZYPREXA) 5 MG tablet  PARoxetine (PAXIL) 20 MG tablet  risperiDONE microspheres ER (RISPERDAL CONSTA) 50 MG injection  spacer (OPTICHAMBER APOLINAR) holding chamber  traZODone (DESYREL) 50 MG tablet          Review of Systems    ROS:  5 point ROS negative except as noted above in HPI, including Gen., Resp., CV, GI &  system review.    Physical Exam   BP: 120/83  Pulse: 86  Temp: 98.3  F (36.8  C)  Height: 160 cm (5' 3\")  Weight: 99.8 kg (220 lb)  SpO2: 95 %      Physical Exam  Great toenail left foot with an ingrown medial aspect of the nail without signs of infection.  No obvious paronychia or erythema.  No drainage.  Normal distal sensation and capillary refill.    ED Course                 Procedures              Critical Care time:  none               No results found. However, due to the size of the patient record, not all encounters were searched. Please check Results Review for a complete set of results.    Medications - No data to display    Assessments & Plan (with Medical Decision Making)     MDM: Maddy Ortiz is a 37 year old female who presents with an ingrown toenail of the great toe.  This is been present for about a week.  She has no signs of infection currently.  We " discussed removal of part of the nail which I could perform today but she declines this.  We discussed home strategy of soaking the toenail and then inserting underneath it cotton pledgets.  She will do this a couple times a day.  If she finds that this is not working then the next step would be to remove the nail.  I have given her podiatry consults in case she decides to have the nail off.  We discussed return immediately for signs of infection.  I have reviewed the nursing notes.    I have reviewed the findings, diagnosis, plan and need for follow up with the patient.       Medical Decision Making  The patient's presentation is strongly suggestive of a clearly self-limited or minor problem.    The patient's evaluation involved:  history and exam without other MDM data elements    The patient's management involved only low risk treatment.        Discharge Medication List as of 2/1/2023 11:08 AM          Final diagnoses:   Ingrown toenail of left foot - warm soaks for 15-20 min.  then put cotton under the nail edge and repeat multiple times daily. retun immed fro signs infection. follow-up podiatry for nail removal if this is not improving       2/1/2023   [facility] EMERGENCY DEPT     Adan Louis MD  02/01/23 [id number]

## 2023-02-01 NOTE — DISCHARGE INSTRUCTIONS
ICD-10-CM    1. Ingrown toenail of left foot  L60.0 Orthopedic  Referral    warm soaks for 15-20 min.  then put cotton under the nail edge and repeat multiple times daily. retun immed fro signs infection. follow-up podiatry for nail removal if this is not improving
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
## 2023-02-02 ENCOUNTER — HOSPITAL ENCOUNTER (OUTPATIENT)
Dept: BEHAVIORAL HEALTH | Facility: CLINIC | Age: 38
Discharge: HOME | End: 2023-02-02
Attending: PSYCHIATRY & NEUROLOGY
Payer: COMMERCIAL

## 2023-02-02 PROCEDURE — 90853 GROUP PSYCHOTHERAPY: CPT | Mod: GT | Performed by: PSYCHOLOGIST

## 2023-02-02 PROCEDURE — 90853 GROUP PSYCHOTHERAPY: CPT | Mod: GT | Performed by: SOCIAL WORKER

## 2023-02-02 NOTE — GROUP NOTE
Psychoeducation Group Note                                    Service Modality:  Video Visit     Telemedicine Visit: The patient's condition can be safely assessed and treated via synchronous audio and visual telemedicine encounter.      Reason for Telemedicine Visit: Patient has requested telehealth visit, Patient unable to travel, Patient convenience (e.g. access to timely appointments / distance to available provider), Patient lives in a designated Health Professional Shortage Area (HPSA), and Services only offered telehealth    Originating Site (Patient Location): Patient's home    Distant Site (Provider Location): [facility] Hospital: [facility], [facility]    Consent:  The patient/guardian has verbally consented to: the potential risks and benefits of telemedicine (video visit) versus in person care; bill my insurance or make self-payment for services provided; and responsibility for payment of non-covered services.     Patient would like the video invitation sent by:  My Chart    Mode of Communication:  Video Conference via Medical Zoom    As the provider I attest to compliance with applicable laws and regulations related to telemedicine.        PATIENT'S NAME: Maddy Ortiz  MRN:   [id number]  :   1985  ACCT. NUMBER: [id number]  DATE OF SERVICE: 23  START TIME:  2:00 PM  END TIME:  2:50 PM  FACILITATOR: Nely Arthur PsyD; Shirley Cooper RN  TOPIC:  Wellness Group: Mind/Body Practice & Complementary  [facility] Adult Mental Health Day Treatment  TRACK: 6B    NUMBER OF PARTICIPANTS: 4      Summary of Group / Topics Discussed:  Mind/Body Practice & Complementary Therapies:  Progressive Muscle Relaxation: In addition to affecting our mood and behavior, psychological stress can cause a myriad of physical symptoms in our body. Patients were educated on these effects and guided to increased self-awareness of how stress affects their body. The purpose, benefits, history, and  techniques of progressive muscle relaxation were discussed. In an instructor guided experiential, patients were guided to practice PMR to help reduce physical symptoms of psychological stress and achieve a more balanced feeling of well-being.    Patient Session Goals / Objectives:  ? Identified physiological symptoms of stress on the body  ? Listed & Explained the purpose and benefits to practicing PMR   ? Practiced progressive muscle relaxation experiential      Patient Participation / Response:  Fully participated with the group by sharing personal reflections / insights and openly received / provided feedback with other participants.    Identified / Expressed personal readiness to practice skills  Maddy discussed how she feels overwhelmed when stressed and then feels exhausted.   Treatment Plan:  Patient has a current master individualized treatment plan.  See Epic treatment plan for more information.    Kait Duke Psy., D,  Licensed Clinical Psychologist

## 2023-02-02 NOTE — GROUP NOTE
Psychoeducation Group Note    PATIENT'S NAME: Maddy Ortiz  MRN:   [id number]  :   1985  ACCT. NUMBER: [id number]  DATE OF SERVICE: 23  START TIME:  3:00 PM  END TIME:  3:50 PM  FACILITATOR: Demetria Cage LICSW; Johnny Maldonado, OTR/L  TOPIC: MH Life Skills Group: Resiliency Development  Service Modality:  Video Visit     Telemedicine Visit: The patient's condition can be safely assessed and treated via synchronous audio and visual telemedicine encounter.       Reason for Telemedicine Visit: Services only offered telehealth and due to COVID-19.     Originating Site (Patient Location): Patient's home     Distant Site (Provider Location): Provider Remote Setting- Home Office     Consent:  The patient/guardian has verbally consented to: the potential risks and benefits of telemedicine (video visit) versus in person care; bill my insurance or make self-payment for services provided; and responsibility for payment of non-covered services.      Patient would like the video invitation sent by:  My Chart     Mode of Communication:  Video Conference via Medical Zoom     As the provider I attest to compliance with applicable laws and regulations related to telemedicine.     [facility]  TRACK: 6B    NUMBER OF PARTICIPANTS: 4    Summary of Group / Topics Discussed:  Resiliency Development:  Coping Skills (Mental Health Check In): Patients were taught how to identify stressors, signs of stress, coping skills, and prevention strategies for overall stress management.  Patients were given the opportunity to identify both ongoing and acute mental health symptoms and how to effectively manage these symptoms by developing an effective aftercare plan.  Patients increased awareness of community based resources.    Patient Session Goals / Objectives:    Identified how using coping skills can be used for symptom and stress management       Improved awareness of individualed symptoms and stressors  and how to effectively cope     Established a relapse prevention plan to practice these skills in their own environments    Practiced and reflected on how to generalize taught skills to their everyday life        Patient Participation / Response:  Fully participated with the group by sharing personal reflections / insights and openly received / provided feedback with other participants.    Verbalized understanding of content    Treatment Plan:  Patient has a current master individualized treatment plan.  See Epic treatment plan for more information.    Demetria Cage, NELLIESW

## 2023-02-02 NOTE — DISCHARGE SUMMARY
Adult Mental Health Intensive Outpatient Discharge Summary/Instructions      Patient: Maddy Ortiz MRN: [id number]   : 1985 Age: 37      Admission Date: 23  Discharge Date: 23  Diagnosis: 295.70  (F25) Schizoaffective Disorder Bipolar Type    300.02 (F41.1) Generalized Anxiety Disorder  Provisional Diagnosis: Patient reported history of diagnoses: ADHD; an anxiety disorder; a bipolar disorder; depression; an eating disorder; a personality disorder; PTSD; schizophrenia       Focus of Treatment / Progress    Personal Safety: she reported being safe during group therapy sessions.     * Follow your safety plan     * Call crisis lines as needed:    [facility] [phone] [facility] [phone]  [facility] [phone] [facility] [phone]  [facility] [phone] [facility] [phone]  [facility] [phone] National Suicide Prevention [phone]  [facility] [phone] Suicide Prevention [phone]  [facility] [phone]    Managing symptoms of:  She reported low interest, low energy, motivation, psychomotor slowing, passive suicidal thoughts, with no intent or plan, feeling like a burden to others, fatigue, interrupted sleep, and low appetite.   She reported problems with paranoia, anxiety, fearfulness, disorganized thoughts, irritability.    Community support/health:  [place] [address] ([phone]) [email or web address], [place] Crisis text Line (Text [place] to 152016) or call [facility]  [place], [place] ([phone])  [facility] [place], [place] ( [phone])  [facility] [address] [place] [address], [phone]    Managing Symptoms and Preventing Relapse    * Go to all of your appointments    * Take all medications as directed.      * Carry a current list if medication with you    * Do not use illicit (street) drugs.  Avoid alcohol    * Report  "these symptoms to your care team. These are early signs of relapse:   Thoughts of suicide   Losing more sleep   Increased confusion   Mood getting worse   Feeling more aggressive   Other:  Increase in symptoms    *Use these skills daily:  Talk to someone you trust at least one time weekly, set boundaries and say \"no\", be assertive, act opposite of negative feelings, accept challenges with a positive attitude, exercise at least three times per week for 30 minutes,  get enough sleep, eat healthy foods, get into a good routine    Copy of summary sent to: In Epic My Chart    Follow up with psychiatrist / main caregiver: Dr. Shirley Gooden at [facility]    Next visit: will schedule    Follow up with your therapist: [facility]   Next visit: will schedule      Go to group therapy and / or support groups at: [facility] and [facility] [place](DBSA) support groups    See your medical doctor about:  For an annual physical exam or any general wellness or illness as needed.    Other:  Your treatment team appreciates having the opportunity to work with you and wishes you the best.    Client Signature:__Unable to sign due to telemedicine.    Date / Time:__4/27/23  Staff Signature:___Dariusz Spaulding D,  Licensed Clinical Psychologist    Date / Time:___4/27/23  1000      "

## 2023-02-02 NOTE — GROUP NOTE
Process Group Note                                    Service Modality:  Video Visit     Telemedicine Visit: The patient's condition can be safely assessed and treated via synchronous audio and visual telemedicine encounter.      Reason for Telemedicine Visit: Patient has requested telehealth visit, Patient unable to travel, Patient convenience (e.g. access to timely appointments / distance to available provider), Patient lives in a designated Health Professional Shortage Area (HPSA), and Services only offered telehealth    Originating Site (Patient Location): Patient's home    Distant Site (Provider Location): [facility] Hospital: [facility], [facility]    Consent:  The patient/guardian has verbally consented to: the potential risks and benefits of telemedicine (video visit) versus in person care; bill my insurance or make self-payment for services provided; and responsibility for payment of non-covered services.     Patient would like the video invitation sent by:  My Chart    Mode of Communication:  Video Conference via Medical Zoom    As the provider I attest to compliance with applicable laws and regulations related to telemedicine.        PATIENT'S NAME: Maddy Ortiz  MRN:   [id number]  :   1985  ACCT. NUMBER: [id number]  DATE OF SERVICE: 23  START TIME:  1:00 PM  END TIME:  1:50 PM  FACILITATOR: Nely Arthur PsyD  TOPIC:  Process Group    Diagnoses:        295.70  (F25) Schizoaffective Disorder Bipolar Type     300.02 (F41.1) Generalized Anxiety Disorder     Patient reported history of diagnoses: ADHD; an anxiety disorder; a bipolar disorder; depression; an eating disorder; a personality disorder; PTSD; schizophrenia.    Psychiatry:  Shirley at [facility]  therapist  [facility]  [place] [facility] Provider  Shirley Freeman.   [facility] Worker with [facility]   with [facility]   [facility] worker twice per week,          [facility]  "Adult Mental Health Day Treatment  TRACK: 6B    NUMBER OF PARTICIPANTS: 4          Data:    Session content: At the start of this group, patients were invited to check in by identifying themselves, describing their current emotional status, and identifying issues to address in this group.   Area(s) of treatment focus addressed in this session included Symptom Management, Personal Safety and Community Resources/Discharge Planning.  Client reported being safe today.  Reported mood is \"disaproval and self-critical.\"   Goal for today is to attend group therapy online and talk with others. The client talked to the group about how she was taking care of herself and her daughter who is a freshman in High School. She reproted that she feels too drowsy from one medication, and is taking all medications. She reported that she keeps all appointments and takes her medications as prescribed. She reported poor sleep, and interrupted sleep. She reported problems with focus and concentration. She talked to others about trying to practice radical acceptance.      Therapeutic Interventions/Treatment Strategies:  Psychotherapist reinforced use of skills. Treatment modalities used include Cognitive Behavioral Therapy and Dialectical Behavioral Therapy. Interventions include Cognitive Restructuring:  Assisted patient in formulating new neutral/positive alternatives to challenge less helpful / ineffective thoughts, Coping Skills: Promoted understanding of how and when to apply grounding strategies to reduce distress and increase presence in the moment, Mindfulness: Facilitated discussion of when/how to use mindfulness skills to benefit general health, mental health symptoms, and stressors and Symptoms Management: Promoted understanding of their diagnoses and how it impacts their functioning.    Assessment:    Patient response:   Patient responded to session by giving feedback, listening and being attentive    Possible barriers to " participation / learning include: severity of symptoms    Health Issues:   None reported       Substance Use Review:   Substance Use: No active concerns identified.    Mental Status/Behavioral Observations  Appearance:   Appropriate   Eye Contact:   Good   Psychomotor Behavior: Normal   Attitude:   Cooperative   Orientation:   All  Speech   Rate / Production: Normal    Volume:  Normal   Mood:    Anxious  Depressed   Affect:    Constricted   Thought Content:   Rumination and Psychosis reports preservative thoughts  Thought Form:  Coherent  Logical     Insight:    Good     Plan:     Safety Plan: Recommended that patient call 911 or go to the local ED should there be a change in any of these risk factors.     Barriers to treatment: None identified    Patient Contracts (see media tab):  None    Substance Use: Provided encouragement towards sobriety     Continue or Discharge: Patient will continue in Adult Day Treatment (ADT)  as planned. Patient is likely to benefit from learning and using skills as they work toward the goals identified in their treatment plan.      Nely Arthur PsyD  February 2, 2023  Dariusz Spaulding, DARNELL,  Licensed Clinical Psychologist

## 2023-02-02 NOTE — PROGRESS NOTES
Adult Outpatient Programs  Individualized Treatment Plan       Date of Plan: 23    Name: Maddy Ortiz MRN: [id number]    : 1985     Program: Adult Day Treatment Program (ADT)    Clinical Track: 6B    DSM5 Diagnosis:   295.70  (F25) Schizoaffective Disorder Bipolar Type    300.02 (F41.1) Generalized Anxiety Disorder  Provisional Diagnosis: Patient reported history of diagnoses: ADHD; an anxiety disorder; a bipolar disorder; depression; an eating disorder; a personality disorder; PTSD; schizophrenia    ADT Multidisciplinary Team Members:  Dr. Dax Haji MD; Diane Peres NP, Nely Arthur PsyD, LP, Michael Maldonado, OTR/L, Shirley Cooper RN, BSN, Demetria Cage, [facility]  Maddy Ortiz will participate in the Adult Outpatient Programs Clinic Group; 3 day per week, 3 hours per day.   Anticipated duration/discharge: 12 weeks    Due to COVID-19, services will be delivered via telemedicine until further notice.     Program Start Date: 23  Anticipated Discharge Date: 23 (pending authorization/clinical changes)    Review Date: Does Maddy Ortiz continue to meet criteria to participate in the ADT Program, 3 days per week; 3 hours per day?   23 Yes- Johnny Maldonado, OTR/L on 2023 at 4:07 PM   2023 Yes Dariusz Spaulding, D,  Licensed Clinical Psychologist  1156 am   2023 staffing Yes - Dax Haji MD on 2023 at 8:20 AM   3/2/2023   Yes, Dariusz Spaulding, DARNELL,  Licensed Clinical Psychologist  1206 pm   3/13/2023 staffing Yes - Dax Haji MD on 3/13/2023 at 8:18 AM   4/10/2023 staffing Yes - Dax Haji MD on 4/10/2023 at 8:25 AM   23 (60 Days) Yes-Johnny Maldonado, OTR/L on 2023 at 4:22 PM   23 Yes Dariusz Spaulding, D,  Licensed Clinical Psychologist  1648 pm   23 Discharge Nely Diedrick Zephyr, Psy., D,  Licensed Clinical Psychologist   [id number]               Client Strengths:  committed to sobriety, creative, has  "a previous history of therapy, motivated and support of family, friends and providers    Client Participation in Plan:  Contributed to goals and plan     Areas of Vulnerability:  Psychotic symptoms/behavior   Anxiety  Depressive symptoms     Long-Term Goals:  Knowledge about illness and management of symptoms   Maintenance of personal safety     Abuse Prevention Plan:  Safe, therapeutic environment   Safety coping plan as needed   Education regarding illness and skill development   Coordination with care providers     Discharge Criteria:  Satisfactory progress toward treatment goals   Improvement re: identified problems and symptoms   Ability to continue recovery at next level of service   Has a discharge plan in place   Has safety/coping plan in place      Areas of Treatment Focus     Why are you seeking treatment/What do you want to focus on during treatment? \"day treatment, anger management, trauma-centered therapy\".  The problem(s) began \"01/01/23.\"  Patient stated she has done a lot of day treatment programs. She did a group program at [facility] last fall. It was okay. Patient has been working with a therapist Callie Whitmore for a couple of years and sees her weekly. Patient has an [facility]HS Worker with [facility], a  with [facility], sees an ILS worker twice per week, and has medication manager Shirley Gooden at [facility]. DA 1/30/23       Area of Treatment Focus:   Personal Safety  Start Date:    2/2/23    Goal:  Target Date: 4/27/23 Status: stopped  Maddy will notify staff when needing assistance to develop or implement a coping plan to manage suicidal or self-harm thoughts.Use coping plan for safety, as needed.      Progress:  2/6, 3/2/2023 Maddy reported being safe during group therapy.    4/13 \"I think I am doing good\"   4/17 and 4/27 She reported being safe.    Treatment Strategies:   Assist clients in establishing / strengthening support network  Assist to identify treatment " "goals  Assess / reassess level of potential for harm to self or others  Engage in safety planning when indicated          Area of Treatment Focus:   Symptom Stabilization and Management  Start Date:    2/2/23    Goal:  Target Date: 4/27/23 Status: stopped  Maddy will learn and practice 1-2 coping skills to help improve management of social skills \"I feel I don't know much about\" how to interact with others, depression and anxiety. Monitor psychosis per diagnosis.    Progress:  2/6/23 Maddy reported using distraction skills and practiced mindfulness with the group.  3/2 Maddy reported that writing has helped her distract from anxiety, and deep breathing.  4/13 \"I think I could do better\"  Spends most of her time playing the computer game \"Second Life\"    4/17 She reported using self-compassion skills and doing activities with her daughter. 4/27 She uses distraction skills, reality checks, CBT, Wise Mind, Opposite to Emotion, mindfulness and relaxation skills    Treatment Strategies:   Assess / reassess level of potential for harm to self or others  Engage in safety planning when indicated  Facilitate increased self awareness  Teach adaptive coping skills and communication skills          Area of Treatment Focus:  Wellness and Mental Health Recovery  Start Date:    2/2/23    Goal:  Target Date: 4/27/23 Status: stopped  Maddy will improve wellness related behaviors by getting adequate sleep (excessive drowsiness and sleeping at the wrong times) and stress relief to maintain good mental health. Also as a part of recovery be thinking about your social support,living situation and purpose.      Progress:   2/6/23 Maddy reported better sleep. 3/2 Maddy reported healthy habits and proper sleep routine.    4/13 Agrees with discharge date of 4/27. discharge plan to include individual therapy, [facility], [facility]MS services, [facility] and [employer] company to help with in home services,going to the store and socializing. Also suggest V[employer] for online " activities and leisure support. 4/27 She is ready to discharge.  4/17 She reported eating healthy foods, going for walks, and taking medications as prescribed.    Treatment Strategies:  Assess / reassess level of potential for harm to self or others  Engage in safety planning when indicated  Facilitate increased self awareness  Teach adaptive coping skills and communication skills          Area of Treatment Focus:   Community Resources / Support and Discharge Planning  Start Date:    2/2/23    Goal:  Target Date: 4/27/23 Status: stopped  Maddy will establish an aftercare plan to include medical providers and social supports by discharge.      Progress:   2/6/23 Maddy reported that she keeps her appointments with providers. 3/2 Maddy communicates regularly with providers and supports.  4/17 She keeps appointments and communicates with her clinic. 4/27 She has built supports and talks about her positive activities with her daughter and others.    Treatment Strategies:   Assist clients in establishing / strengthening support network  Assist with discharge planning  Facilitate increased self awareness     Johnny Maldonado, OTR/L      NOTE: Signatures are available on the Acknowledgement of Treatment Plan located in Chart Review    The Individualized Treatment Plan Signature Page has been routed to the provider for co-sign.    I have reviewed the patient's Individualized Treatment Plan and agree with the current goals, interventions and level of care.     Dax Haji MD  2/6/2023, 2/13/2023, 3/13/2023, 4/10/2023, 4/18/2023    I have reviewed the patient's Individualized Treatment Plan and agree with the current goals, interventions and level of care.     Kait Duke Psy., DARNELL,  Licensed Clinical Psychologist  2/6/2023, 3/2/2023, 4/17/23, 4/27    BETSY Goldman, [facility] 2/6/2023 16:15

## 2023-02-02 NOTE — PROGRESS NOTES
Acknowledgement of Current Treatment Plan       I have reviewed my treatment plan with my therapist on 2/2/23.   I agree with the plan as it is written in the electronic health record. (6B)    Name:      Signature:  Maddy Ortiz  Unable to sign due to Virtual and COVID but verbally agreed to plan     Dax Haji MD  Psychiatrist/Medical Director Dax Haji MD on 2/6/2023 at 3:06 PM   Nely Arthur PsyD,   Psychotherapist Dariusz Spaulding, DARNELL,  Licensed Clinical Psychologist     Michael Maldonado OTR/WALLACE [place]/L on 2/2/2023 at 4:06 PM

## 2023-02-06 ENCOUNTER — OFFICE VISIT (OUTPATIENT)
Dept: PODIATRY | Facility: CLINIC | Age: 38
End: 2023-02-06
Payer: COMMERCIAL

## 2023-02-06 ENCOUNTER — TELEPHONE (OUTPATIENT)
Dept: SURGERY | Facility: CLINIC | Age: 38
End: 2023-02-06

## 2023-02-06 ENCOUNTER — HOSPITAL ENCOUNTER (OUTPATIENT)
Dept: BEHAVIORAL HEALTH | Facility: CLINIC | Age: 38
Discharge: HOME | End: 2023-02-06
Attending: PSYCHIATRY & NEUROLOGY
Payer: COMMERCIAL

## 2023-02-06 VITALS
WEIGHT: 220 LBS | SYSTOLIC BLOOD PRESSURE: 120 MMHG | BODY MASS INDEX: 38.98 KG/M2 | HEART RATE: 78 BPM | HEIGHT: 63 IN | DIASTOLIC BLOOD PRESSURE: 86 MMHG

## 2023-02-06 DIAGNOSIS — L60.0 INGROWN TOENAIL OF LEFT FOOT: ICD-10-CM

## 2023-02-06 DIAGNOSIS — E11.43 TYPE II DIABETES MELLITUS WITH PERIPHERAL AUTONOMIC NEUROPATHY (H): Primary | ICD-10-CM

## 2023-02-06 DIAGNOSIS — R23.4 FISSURE IN SKIN OF BOTH FEET: ICD-10-CM

## 2023-02-06 PROCEDURE — 90853 GROUP PSYCHOTHERAPY: CPT | Mod: GT | Performed by: PSYCHOLOGIST

## 2023-02-06 PROCEDURE — 99213 OFFICE O/P EST LOW 20 MIN: CPT | Performed by: PODIATRIST

## 2023-02-06 PROCEDURE — 90853 GROUP PSYCHOTHERAPY: CPT | Mod: GT | Performed by: SOCIAL WORKER

## 2023-02-06 RX ORDER — AMMONIUM LACTATE 12 G/100G
CREAM TOPICAL 2 TIMES DAILY PRN
Qty: 385 G | Refills: 3 | Status: SHIPPED | OUTPATIENT
Start: 2023-02-06

## 2023-02-06 NOTE — PROGRESS NOTES
"Maddy returns to the office for reevaluation of the right and left foot.  The patient relates recently developing an ingrown toenail on the left great toe.  The patient was seen in the ER and instructed to soak the toe daily.  The patient relates improvement and would like to continue with current therapy.  The patient also relates continued dryness and cracking of the skin on the bottom of both heels.  The patient relates no other problems.    Vitals: /86   Pulse 78   Ht 1.6 m (5' 3\")   Wt 99.8 kg (220 lb)   BMI 38.97 kg/m    BMI= Body mass index is 38.97 kg/m .    Lower Extremity Physical Exam:      Neurovascular status remains unchanged.  Muscular exam is within normal limits to major muscle groups.  Integument is intact.      Noted slightly erythematous lateral nail border of the left great toe.  No drainage noted.  Noted hyperkeratotic fissuring skin around the perimeter of both heels.  No erythema or drainage noted.       Assessment:     ICD-10-CM    1. Type II diabetes mellitus with peripheral autonomic neuropathy (H)  E11.43 ammonium lactate (LAC-HYDRIN) 12 % external cream      2. Ingrown toenail of left foot  L60.0 Orthopedic  Referral    warm soaks for 15-20 min.  then put cotton under the nail edge and repeat multiple times daily. retun immed fro signs infection. follow-up podiatry for nail removal if this is not improving      3. Fissure in skin of both feet  R23.4 ammonium lactate (LAC-HYDRIN) 12 % external cream          Plan:    I have explained to Maddy about the progress of the conditions.  At this time, the patient was given a refill for AmLactin 12% cream to be applied to both heels daily.  The patient was instructed return to the office if the ingrown toenail worsens.    Maddy verbalized agreement with and understanding of the rational for the diagnosis and treatment plan.  All questions were answered to best of my ability and the patient's satisfaction. The patient was " advised to contact the clinic with any questions that may arise after the clinic visit.      Disclaimer: This note consists of symbols derived from keyboarding, dictation and/or voice recognition software. As a result, there may be errors in the script that have gone undetected. Please consider this when interpreting information found in this chart.       ALCIRA Garza D.P.M., MICHEL.[email or web address].S.

## 2023-02-06 NOTE — LETTER
"    2/6/2023         RE: Maddy Ortiz  [address]  [facility] [address]        Dear Colleague,    Thank you for referring your patient, Maddy Ortiz, to the [facility] [place]. Please see a copy of my visit note below.    Maddy returns to the office for reevaluation of the right and left foot.  The patient relates recently developing an ingrown toenail on the left great toe.  The patient was seen in the ER and instructed to soak the toe daily.  The patient relates improvement and would like to continue with current therapy.  The patient also relates continued dryness and cracking of the skin on the bottom of both heels.  The patient relates no other problems.    Vitals: /86   Pulse 78   Ht 1.6 m (5' 3\")   Wt 99.8 kg (220 lb)   BMI 38.97 kg/m    BMI= Body mass index is 38.97 kg/m .    Lower Extremity Physical Exam:      Neurovascular status remains unchanged.  Muscular exam is within normal limits to major muscle groups.  Integument is intact.      Noted slightly erythematous lateral nail border of the left great toe.  No drainage noted.  Noted hyperkeratotic fissuring skin around the perimeter of both heels.  No erythema or drainage noted.       Assessment:     ICD-10-CM    1. Type II diabetes mellitus with peripheral autonomic neuropathy (H)  E11.43 ammonium lactate (LAC-HYDRIN) 12 % external cream      2. Ingrown toenail of left foot  L60.0 Orthopedic  Referral    warm soaks for 15-20 min.  then put cotton under the nail edge and repeat multiple times daily. retun immed fro signs infection. follow-up podiatry for nail removal if this is not improving      3. Fissure in skin of both feet  R23.4 ammonium lactate (LAC-HYDRIN) 12 % external cream          Plan:    I have explained to Maddy about the progress of the conditions.  At this time, the patient was given a refill for AmLactin 12% cream to be applied to both heels daily.  The patient was " instructed return to the office if the ingrown toenail worsens.    Maddy verbalized agreement with and understanding of the rational for the diagnosis and treatment plan.  All questions were answered to best of my ability and the patient's satisfaction. The patient was advised to contact the clinic with any questions that may arise after the clinic visit.      Disclaimer: This note consists of symbols derived from keyboarding, dictation and/or voice recognition software. As a result, there may be errors in the script that have gone undetected. Please consider this when interpreting information found in this chart.       ALCIRA Garza D.P.M., F.A.C.F.A.S.          Again, thank you for allowing me to participate in the care of your patient.        Sincerely,        Rachid Garza DPM

## 2023-02-06 NOTE — NURSING NOTE
"Chief Complaint   Patient presents with     RECHECK     Ingrown toenail and foot care concerns       Initial /86   Pulse 78   Ht 1.6 m (5' 3\")   Wt 99.8 kg (220 lb)   BMI 38.97 kg/m   Estimated body mass index is 38.97 kg/m  as calculated from the following:    Height as of this encounter: 1.6 m (5' 3\").    Weight as of this encounter: 99.8 kg (220 lb).  Medications and allergies reviewed.      Kathryn PATRICK MA    "

## 2023-02-06 NOTE — GROUP NOTE
Psychoeducation Group Note    PATIENT'S NAME: Maddy Ortiz  MRN:   [id number]  :   1985  ACCT. NUMBER: [id number]  DATE OF SERVICE: 23  START TIME:  3:00 PM  END TIME:  3:50 PM  FACILITATOR: Demetria Cage, NELLIESW; Johnny Maldonado, OTR/L  TOPIC: MH Life Skills Group: Communication and Social Skills Development  Service Modality:  Video Visit     Telemedicine Visit: The patient's condition can be safely assessed and treated via synchronous audio and visual telemedicine encounter.       Reason for Telemedicine Visit: Services only offered telehealth and due to COVID-19.     Originating Site (Patient Location): Patient's home     Distant Site (Provider Location): Provider Remote Setting- Home Office     Consent:  The patient/guardian has verbally consented to: the potential risks and benefits of telemedicine (video visit) versus in person care; bill my insurance or make self-payment for services provided; and responsibility for payment of non-covered services.      Patient would like the video invitation sent by:  My Chart     Mode of Communication:  Video Conference via Medical Zoom     As the provider I attest to compliance with applicable laws and regulations related to telemedicine.    [facility] Adult Mental Health Day Treatment  TRACK: 6B    NUMBER OF PARTICIPANTS: 5    Summary of Group / Topics Discussed:  Communication and Social Skills Development: Communication Styles: Conflict Life Gregory (Personal Conflicts): Patients discussed and were taught about different gregory of conflict in life and how it impacts or is impacted by mental health symptoms.  Patients gained awareness of personal conflict gregory.  Patients were taught how to identify and take active steps to resolve these conflicts.      Patient Session Goals / Objectives:    Identified personal conflict gregory and how these impact or are impacted by mental health symptoms        Improved awareness of important life gregory  were conflict is being experienced        Established a plan for practice of these skills in their own environments    Practiced and reflected on how to generalize taught skills to their everyday life      Patient Participation / Response:  Fully participated with the group by sharing personal reflections / insights and openly received / provided feedback with other participants.    Verbalized understanding of content    Treatment Plan:  Patient has a current master individualized treatment plan.  See Epic treatment plan for more information.    Demetria Cage, LICSW

## 2023-02-06 NOTE — GROUP NOTE
Process Group Note                                    Service Modality:  Video Visit     Telemedicine Visit: The patient's condition can be safely assessed and treated via synchronous audio and visual telemedicine encounter.      Reason for Telemedicine Visit: Patient has requested telehealth visit, Patient unable to travel, Patient convenience (e.g. access to timely appointments / distance to available provider), Patient lives in a designated Health Professional Shortage Area (HPSA), and Services only offered telehealth    Originating Site (Patient Location): Patient's home    Distant Site (Provider Location): [facility]: [facility], [facility]    Consent:  The patient/guardian has verbally consented to: the potential risks and benefits of telemedicine (video visit) versus in person care; bill my insurance or make self-payment for services provided; and responsibility for payment of non-covered services.     Patient would like the video invitation sent by:  My Chart    Mode of Communication:  Video Conference via Medical Zoom    As the provider I attest to compliance with applicable laws and regulations related to telemedicine.        PATIENT'S NAME: Maddy Ortiz  MRN:   [id number]  :   1985  ACCT. NUMBER: [id number]  DATE OF SERVICE: 23  START TIME:  1:00 PM  END TIME:  1:50 PM  FACILITATOR: Nely Arthur PsyD  TOPIC:  Process Group    Diagnoses:  295.70  (F25) Schizoaffective Disorder Bipolar Type     300.02 (F41.1) Generalized Anxiety Disorder     Patient reported history of diagnoses: ADHD; an anxiety disorder; a bipolar disorder; depression; an eating disorder; a personality disorder; PTSD; schizophrenia.    Psychiatry:  Shirley at [facility]  therapist  [facility]  [place] Primary Care Provider  Shirley Freeman.   [facility] Worker with [facility]   with [facility]   [facility] worker twice per week,      [facility] Adult  Mental Health Day Treatment  TRACK: 6B    NUMBER OF PARTICIPANTS: 5          Data:    Session content: At the start of this group, patients were invited to check in by identifying themselves, describing their current emotional status, and identifying issues to address in this group.   Area(s) of treatment focus addressed in this session included Symptom Management, Personal Safety and Community Resources/Discharge Planning.  Client reported being safe today.  Reported mood is anxious.    Goal for today is to attend appointments and attend the group therapy online. The client talked to the group about how she was trying to clean the apartment and de-clutter. She talked about her daughter who said that the apartment didn't look right and nothing matched. She reported that she was trying to find ways to make it look nicer. The group discussed different ways to get things to match and how not to spend a lot of money. She discussed how her daughter felt ashamed of the apartment and that it had affected her.      Therapeutic Interventions/Treatment Strategies:  Psychotherapist reinforced use of skills. Treatment modalities used include Cognitive Behavioral Therapy and Dialectical Behavioral Therapy. Interventions include Coping Skills: Discussed meditation skills and addressed ways to implement meditation skills , Relapse Prevention: Assisted patient in identifying personal vulnerabilities, thoughts, emotions, and situations that may lead to relapse , Mindfulness: Facilitated discussion of when/how to use mindfulness skills to benefit general health, mental health symptoms, and stressors and Symptoms Management: Promoted understanding of their diagnoses and how it impacts their functioning.    Assessment:    Patient response:   Patient responded to session by giving feedback, listening and being attentive    Possible barriers to participation / learning include: severity of symptoms    Health Issues:   None reported        Substance Use Review:   Substance Use: No active concerns identified.    Mental Status/Behavioral Observations  Appearance:   Appropriate   Eye Contact:   Good   Psychomotor Behavior: Normal   Attitude:   Cooperative   Orientation:   All  Speech   Rate / Production: Normal    Volume:  Normal   Mood:    Anxious  Depressed   Affect:    Constricted   Thought Content:   Rumination and Psychosis denies any symptoms of psychosis  Thought Form:  Coherent  Logical     Insight:    Good     Plan:     Safety Plan: Recommended that patient call 911 or go to the local ED should there be a change in any of these risk factors.     Barriers to treatment: None identified    Patient Contracts (see media tab):  None    Substance Use: Provided encouragement towards sobriety     Continue or Discharge: Patient will continue in Adult Day Treatment (ADT)  as planned. Patient is likely to benefit from learning and using skills as they work toward the goals identified in their treatment plan.      Nely Arthur PsyD  February 6, 2023  Jacek Spaulding., D,  Licensed Clinical Psychologist

## 2023-02-06 NOTE — GROUP NOTE
Psychoeducation Group Note                                    Service Modality:  Video Visit     Telemedicine Visit: The patient's condition can be safely assessed and treated via synchronous audio and visual telemedicine encounter.      Reason for Telemedicine Visit: Patient has requested telehealth visit, Patient unable to travel, Patient convenience (e.g. access to timely appointments / distance to available provider), Patient lives in a designated Health Professional Shortage Area (HPSA), and Services only offered telehealth    Originating Site (Patient Location): Patient's home    Distant Site (Provider Location): [facility]: [facility], [facility]    Consent:  The patient/guardian has verbally consented to: the potential risks and benefits of telemedicine (video visit) versus in person care; bill my insurance or make self-payment for services provided; and responsibility for payment of non-covered services.     Patient would like the video invitation sent by:  My Chart    Mode of Communication:  Video Conference via Medical Zoom    As the provider I attest to compliance with applicable laws and regulations related to telemedicine.        PATIENT'S NAME: Maddy Ortiz  MRN:   [id number]  :   1985  ACCT. NUMBER: [id number]  DATE OF SERVICE: 23  START TIME:  2:00 PM  END TIME:  2:50 PM  FACILITATOR: Nely Arthur PsyD; Shirley Cooper RN  TOPIC:  Wellness Group: Mental Health Maintenance  [facility] Adult Mental Health Day Treatment  TRACK: 6B    NUMBER OF PARTICIPANTS: 5    Summary of Group / Topics Discussed:  Mental Health Maintenance:  Assessments of Strengths: Patients completed a self-reflection on personal strengths worksheet. The concept of personal strength as it relates to resilience were explored. Patients shared responses with the group and participated in discussion.     Patient Session Goals / Objectives:  ? Patients identified 1-3 qualities they consider a  personal strength.  ? Patients understood the concept of personal strengths and the connection it has to resiliency        Patient Participation / Response:  Fully participated with the group by sharing personal reflections / insights and openly received / provided feedback with other participants.  Maddy talked with others how she was trying to take care of herself first and learning to say no to other activities. She reported that she is cleaning and getting rid of things that she doesn't use in the apartment.     Verbalized understanding of mental health maintenance topic    Treatment Plan:  Patient has a current master individualized treatment plan.  See Epic treatment plan for more information.    Kait Duke Psy., DARNELL,  Licensed Clinical Psychologist

## 2023-02-06 NOTE — TELEPHONE ENCOUNTER
Prior Authorization Retail Medication Request    Medication/Dose: liraglutide (VICTOZA PEN) 18 MG/3ML solution  ICD code (if different than what is on RX):    Previously Tried and Failed:    Rationale:      Insurance Name:    Insurance ID:        Pharmacy Information (if different than what is on RX)  Name:    Phone:

## 2023-02-07 ENCOUNTER — HOSPITAL ENCOUNTER (OUTPATIENT)
Dept: BEHAVIORAL HEALTH | Facility: CLINIC | Age: 38
Discharge: HOME | End: 2023-02-07
Attending: PSYCHIATRY & NEUROLOGY
Payer: COMMERCIAL

## 2023-02-07 PROCEDURE — 90853 GROUP PSYCHOTHERAPY: CPT | Mod: GT | Performed by: PSYCHOLOGIST
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
## 2023-02-07 NOTE — GROUP NOTE
Psychotherapy Group Note                                    Service Modality:  Video Visit     Telemedicine Visit: The patient's condition can be safely assessed and treated via synchronous audio and visual telemedicine encounter.      Reason for Telemedicine Visit: Patient has requested telehealth visit, Patient unable to travel, Patient convenience (e.g. access to timely appointments / distance to available provider), Patient lives in a designated Health Professional Shortage Area (HPSA), and Services only offered telehealth    Originating Site (Patient Location): Patient's home    Distant Site (Provider Location): [facility]: [facility], [facility]    Consent:  The patient/guardian has verbally consented to: the potential risks and benefits of telemedicine (video visit) versus in person care; bill my insurance or make self-payment for services provided; and responsibility for payment of non-covered services.     Patient would like the video invitation sent by:  My Chart    Mode of Communication:  Video Conference via Medical Zoom    As the provider I attest to compliance with applicable laws and regulations related to telemedicine.        PATIENT'S NAME: Maddy Ortiz  MRN:   [id number]  :   1985  ACCT. NUMBER: [id number]  DATE OF SERVICE: 23  START TIME:  2:00 PM  END TIME:  2:50 PM  FACILITATOR: Nely Arthur PsyD  TOPIC:  EBP Group: Behavioral Activation  [facility] Adult Mental Health Day Treatment  TRACK: 6B    NUMBER OF PARTICIPANTS: 4    Summary of Group / Topics Discussed:  Behavioral Activation: The Change Process - Behavior Change: Patients explored the process and types of change, including but not limited to, theories of change, steps to making change, methods of changing behavior, and potential barriers.  Patients worked to identify what changes may benefit their daily lives, and work towards a plan to implement change.      Patient Session Goals /  Objectives:    Demonstrate understanding of the change process.      Identify positive and negative behavioral patterns.    Make plans to track and implement changes and share experiences in group.    Identify personal barriers to change      Patient Participation / Response:  Fully participated with the group by sharing personal reflections / insights and openly received / provided feedback with other participants.  Maddy talked to the group about how she is trying to change the way she responds to others and how she interacts with her daughter's school she reported that she has a family therapist appointment today and will bring up issues around anger with the therapist.   Expressed understanding of the relationship between behaviors, thoughts, and feelings    Treatment Plan:  Patient has a current master individualized treatment plan.  See Epic treatment plan for more information.    Kait Duke Psy., D,  Licensed Clinical Psychologist

## 2023-02-08 NOTE — TELEPHONE ENCOUNTER
[facility] reward form and lab request printed and routed to Shirley Freeman to hold for 2/14/23 appointment.

## 2023-02-09 ENCOUNTER — HOSPITAL ENCOUNTER (OUTPATIENT)
Dept: BEHAVIORAL HEALTH | Facility: CLINIC | Age: 38
Discharge: HOME | End: 2023-02-09
Attending: PSYCHIATRY & NEUROLOGY
Payer: COMMERCIAL

## 2023-02-09 PROCEDURE — 90853 GROUP PSYCHOTHERAPY: CPT | Mod: 95 | Performed by: PSYCHOLOGIST

## 2023-02-09 PROCEDURE — 90853 GROUP PSYCHOTHERAPY: CPT | Mod: 95 | Performed by: SOCIAL WORKER

## 2023-02-09 NOTE — TELEPHONE ENCOUNTER
Prior Authorization Approval    Authorization Effective Date: 1/1/2023  Authorization Expiration Date: 2/8/2026  Medication: liraglutide (VICTOZA PEN) 18 MG/3ML solution PA APPROVED  Approved Dose/Quantity: 9mls/90 days  Reference #:     Insurance Company: [employer] - Phone [phone] Fax [phone]  Expected CoPay:       CoPay Card Available:      Foundation Assistance Needed:    Which Pharmacy is filling the prescription (Not needed for infusion/clinic administered): [id number] [place] [address]  Pharmacy Notified: Yes  Patient Notified: Comment:  Pharmacy will notify patient.

## 2023-02-09 NOTE — TELEPHONE ENCOUNTER
PA Initiation    Medication: liraglutide (VICTOZA PEN) 18 MG/3ML solution PA INITIATED  Insurance Company: [employer] - Phone [phone] Fax [phone]  Pharmacy Filling the Rx: [id number] [place] [address]  Filling Pharmacy Phone: [phone]  Filling Pharmacy Fax:    Start Date: 2/9/2023    Central Prior Authorization Team   Phone: [phone]

## 2023-02-09 NOTE — GROUP NOTE
Psychoeducation Group Note                                    Service Modality:  Video Visit     Telemedicine Visit: The patient's condition can be safely assessed and treated via synchronous audio and visual telemedicine encounter.      Reason for Telemedicine Visit: Patient has requested telehealth visit, Patient unable to travel, Patient convenience (e.g. access to timely appointments / distance to available provider), Patient lives in a designated Health Professional Shortage Area (HPSA), and Services only offered telehealth    Originating Site (Patient Location): Patient's home    Distant Site (Provider Location): [facility]: [facility], [facility]    Consent:  The patient/guardian has verbally consented to: the potential risks and benefits of telemedicine (video visit) versus in person care; bill my insurance or make self-payment for services provided; and responsibility for payment of non-covered services.     Patient would like the video invitation sent by:  My Chart    Mode of Communication:  Video Conference via Medical Zoom    As the provider I attest to compliance with applicable laws and regulations related to telemedicine.        PATIENT'S NAME: Maddy Ortiz  MRN:   [id number]  :   1985  ACCT. NUMBER: [id number]  DATE OF SERVICE: 23  START TIME:  2:00 PM  END TIME:  2:50 PM  FACILITATOR: Nely Arthur PsyD; Shirley Cooper RN  TOPIC:  Wellness Group: Medication Education and Management  [facility] Adult Mental Health Day Treatment  TRACK: 6B    NUMBER OF PARTICIPANTS: 3    Summary of Group / Topics Discussed:  Medication Educations and Management:  Medication Jeopardy: Patients provided education regarding medication safety, antidepressants, side effects, neuroleptics, expected medication outcomes, knowledge of diagnosis, symptoms, and symptom management through an engaging jeopardy-style format.     Patient Session Goals / Objectives:    ? Participated in  team-based Jeopardy game  ? Identified strategies for safe use, handling, and disposal of medications  ? Discussed basic aspects of medication safety, side effects, adverse outcomes and contraindications      Patient Participation / Response:  Fully participated with the group by sharing personal reflections / insights and openly received / provided feedback with other participants.     Demonstrated understanding of topics discussed through group discussion and participation  Maddy talked to others about medications and different ways to remember to take them.   Treatment Plan:  Patient has a current master individualized treatment plan.  See Epic treatment plan for more information.    Kait Duke Psy., D,  Licensed Clinical Psychologist

## 2023-02-09 NOTE — GROUP NOTE
Psychoeducation Group Note    PATIENT'S NAME: Maddy Ortiz  MRN:   [id number]  :   1985  ACCT. NUMBER: [id number]  DATE OF SERVICE: 23  START TIME:  3:00 PM  END TIME:  3:50 PM  FACILITATOR: Demetria Cage LICSW; Johnny Maldonado, OTR/L  TOPIC: MH Life Skills Group: Resiliency Development  Service Modality:  Video Visit     Telemedicine Visit: The patient's condition can be safely assessed and treated via synchronous audio and visual telemedicine encounter.       Reason for Telemedicine Visit: Services only offered telehealth and due to COVID-19.     Originating Site (Patient Location): Patient's home     Distant Site (Provider Location): Provider Remote Setting- Home Office     Consent:  The patient/guardian has verbally consented to: the potential risks and benefits of telemedicine (video visit) versus in person care; bill my insurance or make self-payment for services provided; and responsibility for payment of non-covered services.      Patient would like the video invitation sent by:  My Chart     Mode of Communication:  Video Conference via Medical Zoom     As the provider I attest to compliance with applicable laws and regulations related to telemedicine.    [facility] Adult Mental Health Day Treatment  TRACK: 6B    NUMBER OF PARTICIPANTS: 2    Summary of Group / Topics Discussed:  Resiliency Development:  Coping Skills (PROM): Patients were taught how to identify stressors, signs of stress, coping skills, and prevention strategies for overall stress management.  Patients were given the opportunity to identify both ongoing and acute mental health symptoms and how to effectively manage these symptoms by developing an effective aftercare plan.  Patients increased awareness of community based resources.    Patient Session Goals / Objectives:    Identified how using coping skills can be used for symptom and stress management       Improved awareness of individualed symptoms and  stressors and how to effectively cope     Established a relapse prevention plan to practice these skills in their own environments    Practiced and reflected on how to generalize taught skills to their everyday life    Reviewed and discussed the PROM  (Personal Recovery Outcome Measure)         Patient Participation / Response:  Fully participated with the group by sharing personal reflections / insights and openly received / provided feedback with other participants.    Verbalized understanding of content    Treatment Plan:  Patient has a current master individualized treatment plan.  See Epic treatment plan for more information.    Demetria Cage, LICSW

## 2023-02-09 NOTE — GROUP NOTE
Process Group Note                                    Service Modality:  Video Visit     Telemedicine Visit: The patient's condition can be safely assessed and treated via synchronous audio and visual telemedicine encounter.      Reason for Telemedicine Visit: Patient has requested telehealth visit, Patient unable to travel, Patient convenience (e.g. access to timely appointments / distance to available provider), Patient lives in a designated Health Professional Shortage Area (HPSA), and Services only offered telehealth    Originating Site (Patient Location): Patient's home    Distant Site (Provider Location): [facility]: [facility], [facility]    Consent:  The patient/guardian has verbally consented to: the potential risks and benefits of telemedicine (video visit) versus in person care; bill my insurance or make self-payment for services provided; and responsibility for payment of non-covered services.     Patient would like the video invitation sent by:  My Chart    Mode of Communication:  Video Conference via Medical Zoom    As the provider I attest to compliance with applicable laws and regulations related to telemedicine.        PATIENT'S NAME: Maddy Ortiz  MRN:   [id number]  :   1985  ACCT. NUMBER: [id number]  DATE OF SERVICE: 23  START TIME:  1:00 PM  END TIME:  1:50 PM  FACILITATOR: Nely Arthur PsyD  TOPIC:  Process Group    Diagnoses:  295.70  (F25) Schizoaffective Disorder Bipolar Type     300.02 (F41.1) Generalized Anxiety Disorder     Patient reported history of diagnoses: ADHD; an anxiety disorder; a bipolar disorder; depression; an eating disorder; a personality disorder; PTSD; schizophrenia.    Psychiatry:  [facility]ir[facility] at [facility]  therapist  [facility]  [place] [facility] Provider  Shirley Freeman.   [facility] Worker with [facility]   with [facility]   [facility] worker twice per week,      [facility]  "Mental Health Day Treatment  TRACK: 6B    NUMBER OF PARTICIPANTS: 5          Data:    Session content: At the start of this group, patients were invited to check in by identifying themselves, describing their current emotional status, and identifying issues to address in this group.   Area(s) of treatment focus addressed in this session included Symptom Management, Personal Safety and Community Resources/Discharge Planning.  Client reported being safe today.  Reported mood is \"frustrated.\"     Goal for today is to attend online therapy and talk with others in the group. The client talked to the group about how she felt frustrated about the school's communication about her daughter, her [employer] workers don't help her all the time, and a friend wants to have a romantic relationship. She talked to the group about how she doesn't want to have a relationship and needs time to work on herself and her daughter. She reported that her family therapy got re-scheduled to Friday, tomorrow and the group encouraged her to write down her frustrating thoughts and bring them to therapy. She talked with the group about relationships.       Therapeutic Interventions/Treatment Strategies:  Psychotherapist reinforced use of skills. Treatment modalities used include Cognitive Behavioral Therapy and Dialectical Behavioral Therapy. Interventions include Cognitive Restructuring:  Assisted patient in formulating new neutral/positive alternatives to challenge less helpful / ineffective thoughts, Coping Skills: Reviewed patients current calming practices and discussed a more formal way of practicing and accessing skills, Relapse Prevention: Facilitated understanding of effective coping skills in response to triggers for substance use and Mindfulness: Facilitated discussion of when/how to use mindfulness skills to benefit general health, mental health symptoms, and stressors.    Assessment:    Patient response:   Patient responded to " session by giving feedback, listening and accepting support    Possible barriers to participation / learning include: severity of symptoms    Health Issues:   None reported       Substance Use Review:   Substance Use: No active concerns identified.    Mental Status/Behavioral Observations  Appearance:   Appropriate   Eye Contact:   Good   Psychomotor Behavior: Normal   Attitude:   Cooperative   Orientation:   All  Speech   Rate / Production: Normal    Volume:  Normal   Mood:    Anxious  Depressed   Affect:    Constricted   Thought Content:   Rumination and Psychosis reports preservative thoughts  Thought Form:  Coherent  Logical     Insight:    Good     Plan:     Safety Plan: Recommended that patient call 911 or go to the local ED should there be a change in any of these risk factors.     Barriers to treatment: None identified    Patient Contracts (see media tab):  None    Substance Use: Provided encouragement towards sobriety     Continue or Discharge: Patient will continue in Adult Day Treatment (ADT)  as planned. Patient is likely to benefit from learning and using skills as they work toward the goals identified in their treatment plan.      Nely Arthur PsyD  February 9, 2023  Dariusz Spaulding, D,  Licensed Clinical Psychologist

## 2023-02-13 ENCOUNTER — HOSPITAL ENCOUNTER (OUTPATIENT)
Dept: BEHAVIORAL HEALTH | Facility: CLINIC | Age: 38
Discharge: HOME | End: 2023-02-13
Attending: PSYCHIATRY & NEUROLOGY
Payer: COMMERCIAL

## 2023-02-13 ENCOUNTER — TELEPHONE (OUTPATIENT)
Dept: BEHAVIORAL HEALTH | Facility: CLINIC | Age: 38
End: 2023-02-13
Payer: COMMERCIAL

## 2023-02-13 PROCEDURE — 90853 GROUP PSYCHOTHERAPY: CPT | Mod: GT | Performed by: PSYCHOLOGIST

## 2023-02-13 PROCEDURE — 90853 GROUP PSYCHOTHERAPY: CPT | Mod: 95 | Performed by: SOCIAL WORKER

## 2023-02-13 NOTE — GROUP NOTE
Psychoeducation Group Note    Service Modality:  Video Visit     Telemedicine Visit: The patient's condition can be safely assessed and treated via synchronous audio and visual telemedicine encounter.      Reason for Telemedicine Visit: Patient has requested telehealth visit, Patient unable to travel, Patient convenience (e.g. access to timely appointments / distance to available provider), Patient lives in a designated Health Professional [facility]), and Services only offered telehealth    Originating Site (Patient Location): Patient's home    Distant Site (Provider Location): remote site at therapist's home    Consent:  The patient/guardian has verbally consented to: the potential risks and benefits of telemedicine (video visit) versus in person care; bill my insurance or make self-payment for services provided; and responsibility for payment of non-covered services.     Patient would like the video invitation sent by:  My Chart    Mode of Communication:  Video Conference via Medical Zoom    As the provider I attest to compliance with applicable laws and regulations related to telemedicine.     PATIENT'S NAME: Maddy Ortiz  MRN:   [id number]  :   1985  ACCT. NUMBER: [id number]  DATE OF SERVICE: 23  START TIME:  2:00 PM  END TIME:  2:50 PM  FACILITATOR: Nely Arthur PsyD; Shirley Cooper RN  TOPIC: MH Wellness Group: Mental Health Maintenance  [facility] Adult Mental Health Day Treatment  TRACK: 6B    NUMBER OF PARTICIPANTS: 5    Summary of Group / Topics Discussed:  Mental Health Maintenance:  Assessments of Strengths: Patients completed a self-reflection on personal strengths worksheet. The concept of personal strength as it relates to resilience were explored. Patients shared responses with the group and participated in discussion.     Patient Session Goals / Objectives:  ? Patients identified 1-3 qualities they consider a personal strength.  ? Patients understood the concept of  personal strengths and the connection it has to resiliency        Patient Participation / Response:  Fully participated with the group by sharing personal reflections / insights and openly received / provided feedback with other participants.  Maddy reported that she is organizing her apartment, spackling and driklling. She reported that she will pain next month.   Further teaching needed, such as discussed groups of people and that it is more important to talk about personal feelings    Treatment Plan:  Patient has a current master individualized treatment plan.  See Epic treatment plan for more information.    Kait Duke Psy., DARNELL,  Licensed Clinical Psychologist

## 2023-02-13 NOTE — GROUP NOTE
Psychoeducation Group Note    PATIENT'S NAME: Maddy Ortiz  MRN:   [id number]  :   1985  ACCT. NUMBER: [id number]  DATE OF SERVICE: 23  START TIME:  3:00 PM  END TIME:  3:50 PM  FACILITATOR: Demetria Cage LICSW; Johnny Maldonado, OTR/L  TOPIC: MH Life Skills Group: Communication and Social Skills Development  Service Modality:  Video Visit     Telemedicine Visit: The patient's condition can be safely assessed and treated via synchronous audio and visual telemedicine encounter.       Reason for Telemedicine Visit: Services only offered telehealth and due to COVID-19.     Originating Site (Patient Location): Patient's home     Distant Site (Provider Location): Provider Remote Setting- Home Office     Consent:  The patient/guardian has verbally consented to: the potential risks and benefits of telemedicine (video visit) versus in person care; bill my insurance or make self-payment for services provided; and responsibility for payment of non-covered services.      Patient would like the video invitation sent by:  My Chart     Mode of Communication:  Video Conference via Medical Zoom     As the provider I attest to compliance with applicable laws and regulations related to telemedicine.    [facility] Adult Mental Health Day Treatment  TRACK: 6B    NUMBER OF PARTICIPANTS: 5    Summary of Group / Topics Discussed:  Communication and Social Skills Development: Social Supports: Reaching Out: Patients were taught and gained awareness of the importance of asking for help from other people as a way to expand their support network.  Patients identified both personal strengths and barriers in asking for help.  Patients were provided with skills and opportunity to practice asking for help to improve overall communication and connection with other people.      Patient Session Goals / Objectives:    Identified strengths and opportunities for growth in asking for help and how this impacts their ability  "to clearly communicate needs to other people       Improved awareness of important aspects of asking for help and support and how this relates to mental health recovery        Established a plan for practice of these skills in their own environments    Practiced and reflected on how to generalize taught skills to their everyday life    Education with film, \"Feeling Alone with Mental Illness\" by Temi Mckeon from the Living Well with Schizophrenia.    Provide community resources to expand support, I.e. VPSN, [place], DBSA, PARESH, [employer].      Patient Participation / Response:  Fully participated with the group by sharing personal reflections / insights and openly received / provided feedback with other participants.    Verbalized understanding of content    Treatment Plan:  Patient has a current master individualized treatment plan.  See Epic treatment plan for more information.    Demetria Cage, NELLIESW      "

## 2023-02-13 NOTE — TELEPHONE ENCOUNTER
Phone call to Maddy to discuss rules of   Group therapy and how to address problems with large groups of people. She agreed to focus on her own emotions.  Dariusz Spaulding, D,  Licensed Clinical Psychologist

## 2023-02-13 NOTE — GROUP NOTE
Process Group Note    Service Modality:  Video Visit     Telemedicine Visit: The patient's condition can be safely assessed and treated via synchronous audio and visual telemedicine encounter.      Reason for Telemedicine Visit: Patient has requested telehealth visit, Patient unable to travel, Patient convenience (e.g. access to timely appointments / distance to available provider), Patient lives in a designated Health Professional Shortage Area (HPSA), and Services only offered telehealth    Originating Site (Patient Location): Patient's home    Distant Site (Provider Location): remote site at therapist's home    Consent:  The patient/guardian has verbally consented to: the potential risks and benefits of telemedicine (video visit) versus in person care; bill my insurance or make self-payment for services provided; and responsibility for payment of non-covered services.     Patient would like the video invitation sent by:  My Chart    Mode of Communication:  Video Conference via Medical Zoom    As the provider I attest to compliance with applicable laws and regulations related to telemedicine.     PATIENT'S NAME: Maddy Ortiz  MRN:   [id number]  :   1985  ACCT. NUMBER: [id number]  DATE OF SERVICE: 23  START TIME:  1:00 PM  END TIME:  1:50 PM  FACILITATOR: Nely Arthur PsyD  TOPIC:  Process Group    Diagnoses:  295.70  (F25) Schizoaffective Disorder Bipolar Type     300.02 (F41.1) Generalized Anxiety Disorder     Patient reported history of diagnoses: ADHD; an anxiety disorder; a bipolar disorder; depression; an eating disorder; a personality disorder; PTSD; schizophrenia.    Psychiatry:  Shirley at [facility]  therapist  [facility]  [place] [facility] Provider  Shirley Freeman.   [facility] Worker with [facility]   with [facility]   [facility] worker twice per week,      [facility] Adult Mental Health Day Treatment  TRACK: 6B    NUMBER OF  "PARTICIPANTS: 5          Data:    Session content: At the start of this group, patients were invited to check in by identifying themselves, describing their current emotional status, and identifying issues to address in this group.   Area(s) of treatment focus addressed in this session included Symptom Management, Personal Safety and Community Resources/Discharge Planning.  Client reported being safe today.  Reported mood is \"overwhelmed.\"    Goal for today is to attend group therapy online and talk with others. The client talked to the group about how she is cleaning and organizing her apartment, donated clothing and furniture, moved the bedrooms around, and plans to paint some rooms. She talked to the group about how she is having trouble with electronics and getting special messages from them and that some words bring up triggers of PTSD for her. She reported that she went to [employer] and got tools to paint and spackle.     Therapeutic Interventions/Treatment Strategies:  Psychotherapist reinforced use of skills. Treatment modalities used include Cognitive Behavioral Therapy and Dialectical Behavioral Therapy. Interventions include Coping Skills: Facilitated understanding of  what factors may contribute to symptom relapse and skills plan to manage symptom relapse , Relapse Prevention: Facilitated understanding of effective coping skills in response to triggers for substance use, Mindfulness: Facilitated discussion of when/how to use mindfulness skills to benefit general health, mental health symptoms, and stressors and Symptoms Management: Promoted understanding of their diagnoses and how it impacts their functioning.    Assessment:    Patient response:   Patient responded to session by focusing on goals, being attentive and appearing alert    Possible barriers to participation / learning include: severity of symptoms    Health Issues:   None reported       Substance Use Review:   Substance Use: No active " concerns identified.    Mental Status/Behavioral Observations  Appearance:   Appropriate   Eye Contact:   Good   Psychomotor Behavior: Normal   Attitude:   Cooperative   Orientation:   All  Speech   Rate / Production: Normal    Volume:  Normal   Mood:    Anxious  Sad   Affect:    Constricted   Thought Content:   Rumination and Psychosis reports paranoia and preservative thoughts       Special messages from electronics  Thought Form:  Coherent  Logical  Psychosis    Insight:    Good     Plan:     Safety Plan: Recommended that patient call 911 or go to the local ED should there be a change in any of these risk factors.     Barriers to treatment: None identified    Patient Contracts (see media tab):  None    Substance Use: Provided encouragement towards sobriety     Continue or Discharge: Patient will continue in Adult Day Treatment (ADT)  as planned. Patient is likely to benefit from learning and using skills as they work toward the goals identified in their treatment plan.      Nely Arthur PsyD  February 13, 2023  Jacek Spaulding., D,  Licensed Clinical Psychologist

## 2023-02-14 ENCOUNTER — HOSPITAL ENCOUNTER (OUTPATIENT)
Dept: BEHAVIORAL HEALTH | Facility: CLINIC | Age: 38
Discharge: HOME | End: 2023-02-14
Attending: PSYCHIATRY & NEUROLOGY
Payer: COMMERCIAL

## 2023-02-14 PROCEDURE — 90853 GROUP PSYCHOTHERAPY: CPT | Mod: 95 | Performed by: PSYCHOLOGIST

## 2023-02-14 PROCEDURE — 90853 GROUP PSYCHOTHERAPY: CPT | Mod: GT | Performed by: PSYCHOLOGIST

## 2023-02-14 NOTE — GROUP NOTE
Process Group Note                                    Service Modality:  Video Visit     Telemedicine Visit: The patient's condition can be safely assessed and treated via synchronous audio and visual telemedicine encounter.      Reason for Telemedicine Visit: Patient has requested telehealth visit, Patient unable to travel, Patient convenience (e.g. access to timely appointments / distance to available provider), Patient lives in a designated Health Professional Shortage Area (HPSA), and Services only offered telehealth    Originating Site (Patient Location): Patient's home    Distant Site (Provider Location): [facility]: [facility], [facility]    Consent:  The patient/guardian has verbally consented to: the potential risks and benefits of telemedicine (video visit) versus in person care; bill my insurance or make self-payment for services provided; and responsibility for payment of non-covered services.     Patient would like the video invitation sent by:  My Chart    Mode of Communication:  Video Conference via Medical Zoom    As the provider I attest to compliance with applicable laws and regulations related to telemedicine.        PATIENT'S NAME: Maddy Ortiz  MRN:   [id number]  :   1985  ACCT. NUMBER: [id number]  DATE OF SERVICE: 23  START TIME:  1:00 PM  END TIME:  1:50 PM  FACILITATOR: Nely Arthur PsyD  TOPIC:  Process Group    Diagnoses:  295.70  (F25) Schizoaffective Disorder Bipolar Type     300.02 (F41.1) Generalized Anxiety Disorder     Patient reported history of diagnoses: ADHD; an anxiety disorder; a bipolar disorder; depression; an eating disorder; a personality disorder; PTSD; schizophrenia.    Psychiatry:  Shirlye at [facility]  therapist  [facility]  [place] Primary [facility] Provider  Shirley Freeman.   [facility] Worker with [facility]   with [facility]   [facility] worker twice per week,      [facility]  Mental Health Day Treatment  TRACK:6B    NUMBER OF PARTICIPANTS: 4          Data:    Session content: At the start of this group, patients were invited to check in by identifying themselves, describing their current emotional status, and identifying issues to address in this group.   Area(s) of treatment focus addressed in this session included Symptom Management, Personal Safety and Community Resources/Discharge Planning.  Client reported being safe today.  Reported mood is anxious.    Goal for today is to attend group therapy online.  The client talked to the group about how she is trying to do behavioral changes in her life and is working on it with many skills. She reported that she wanted to go to [employer] and that she feels relaxed at the [employer]. She talked to the group  About her decorations and talked to her daughter about it, but her daughter didn't like something she made. She talked to the group about the daughter and the school and how she is trying to become aware of her thoughts and behaviors.      Therapeutic Interventions/Treatment Strategies:  Psychotherapist reinforced use of skills. Treatment modalities used include Cognitive Behavioral Therapy and Dialectical Behavioral Therapy. Interventions include Coping Skills: Promoted understanding of how and when to apply grounding strategies to reduce distress and increase presence in the moment, Relapse Prevention: Coached on skills to manage factors that contribute to relapse, Mindfulness: Facilitated discussion of when/how to use mindfulness skills to benefit general health, mental health symptoms, and stressors and Symptoms Management: Promoted understanding of their diagnoses and how it impacts their functioning.    Assessment:    Patient response:   Patient responded to session by listening, focusing on goals and being attentive    Possible barriers to participation / learning include: severity of symptoms    Health Issues:   None reported        Substance Use Review:   Substance Use: No active concerns identified.    Mental Status/Behavioral Observations  Appearance:   Appropriate   Eye Contact:   Good   Psychomotor Behavior: Normal   Attitude:   Cooperative   Orientation:   All  Speech   Rate / Production: Normal    Volume:  Normal   Mood:    Anxious  Depressed  Sad   Affect:    Constricted   Thought Content:   Rumination and Psychosis reports paranoia  Thought Form:  Coherent  Logical  Psychosis    Insight:    Good     Plan:     Safety Plan: Recommended that patient call 911 or go to the local ED should there be a change in any of these risk factors.     Barriers to treatment: None identified    Patient Contracts (see media tab):  None    Substance Use: Provided encouragement towards sobriety     Continue or Discharge: Patient will continue in Adult Day Treatment (ADT)  as planned. Patient is likely to benefit from learning and using skills as they work toward the goals identified in their treatment plan.      Nely Arthur PsyD  February 14, 2023  Jacek Spaulding., D,  Licensed Clinical Psychologist

## 2023-02-14 NOTE — GROUP NOTE
Psychotherapy Group Note                                    Service Modality:  Video Visit     Telemedicine Visit: The patient's condition can be safely assessed and treated via synchronous audio and visual telemedicine encounter.      Reason for Telemedicine Visit: Patient has requested telehealth visit, Patient unable to travel, Patient convenience (e.g. access to timely appointments / distance to available provider), Patient lives in a designated Health Professional Shortage Area (HPSA), and Services only offered telehealth    Originating Site (Patient Location): Patient's home    Distant Site (Provider Location): [facility]: [facility], [facility]    Consent:  The patient/guardian has verbally consented to: the potential risks and benefits of telemedicine (video visit) versus in person care; bill my insurance or make self-payment for services provided; and responsibility for payment of non-covered services.     Patient would like the video invitation sent by:  My Chart    Mode of Communication:  Video Conference via Medical Zoom    As the provider I attest to compliance with applicable laws and regulations related to telemedicine.        PATIENT'S NAME: Maddy Ortiz  MRN:   [id number]  :   1985  ACCT. NUMBER: [id number]  DATE OF SERVICE: 23  START TIME:  2:00 PM  END TIME:  2:50 PM  FACILITATOR: Nely Arthur PsyD  TOPIC:  EBP Group: Coping Skills  [facility] Adult Mental Health Day Treatment  TRACK: 6B    NUMBER OF PARTICIPANTS: 4    Summary of Group / Topics Discussed:  Coping Skills: Distraction: Patients learned to mindfully use distraction as a way to decrease heightened stress in the moment.  Patients will identified situations that necessitate healthy distraction strategies.  They explored ways to manage physical symptoms of distress using distraction. The group began to distinguish when this can be useful in their lives or when other strategies would be more  relevant or helpful.    Patient Session Goals / Objectives:    Understand the purpose and benefits of using healthy distraction to decrease distress.    Process what happens in the body when using distraction strategies.    Demonstrate understanding of when to use distraction strategies.    Explore patient s current distraction activities, and how to take a more intentional approach to the use of distraction.    Identify and problem solve barriers to applying distraction strategies.    Choose 1-2 healthy distraction strategies to apply during times of distress.        Patient Participation / Response:  Fully participated with the group by sharing personal reflections / insights and openly received / provided feedback with other participants.    Demonstrated knowledge of when to consider using a variety of coping skills in daily life  Maddy discussed doing arts and crafts for distraction skills and that she is decorating her apartment with her daughter.  Treatment Plan:  Patient has a current master individualized treatment plan.  See Epic treatment plan for more information.    Kait Duke Psy., D,  Licensed Clinical Psychologist

## 2023-02-15 ENCOUNTER — TELEPHONE (OUTPATIENT)
Dept: SURGERY | Facility: CLINIC | Age: 38
End: 2023-02-15

## 2023-02-15 DIAGNOSIS — E11.9 TYPE 2 DIABETES MELLITUS WITHOUT COMPLICATION, WITHOUT LONG-TERM CURRENT USE OF INSULIN (H): ICD-10-CM

## 2023-02-15 RX ORDER — LIRAGLUTIDE 6 MG/ML
INJECTION SUBCUTANEOUS
Qty: 9 ML | Refills: 2 | Status: SHIPPED | OUTPATIENT
Start: 2023-02-15 | End: 2023-05-25

## 2023-02-15 NOTE — TELEPHONE ENCOUNTER
Refill request sent to  for approval.    Cayla Gonzalez RN, CBN  [facility]  P [phone]  F [phone]

## 2023-02-15 NOTE — TELEPHONE ENCOUNTER
Pt called in to let clinic know Pharmacy is reaching out to fill her Victoza prescription    [phone]

## 2023-02-16 ENCOUNTER — VIRTUAL VISIT (OUTPATIENT)
Dept: FAMILY MEDICINE | Facility: CLINIC | Age: 38
End: 2023-02-16
Payer: COMMERCIAL

## 2023-02-16 ENCOUNTER — HOSPITAL ENCOUNTER (OUTPATIENT)
Dept: BEHAVIORAL HEALTH | Facility: CLINIC | Age: 38
Discharge: HOME | End: 2023-02-16
Attending: PSYCHIATRY & NEUROLOGY
Payer: COMMERCIAL

## 2023-02-16 DIAGNOSIS — G62.9 PERIPHERAL POLYNEUROPATHY: Primary | ICD-10-CM

## 2023-02-16 PROCEDURE — 90853 GROUP PSYCHOTHERAPY: CPT | Mod: GT | Performed by: SOCIAL WORKER

## 2023-02-16 PROCEDURE — 99213 OFFICE O/P EST LOW 20 MIN: CPT | Mod: VID | Performed by: NURSE PRACTITIONER

## 2023-02-16 PROCEDURE — 90853 GROUP PSYCHOTHERAPY: CPT | Mod: GT | Performed by: PSYCHOLOGIST

## 2023-02-16 RX ORDER — GABAPENTIN 300 MG/1
300 CAPSULE ORAL 3 TIMES DAILY
COMMUNITY
End: 2023-02-16

## 2023-02-16 RX ORDER — LAMOTRIGINE 25 MG/1
TABLET ORAL
COMMUNITY
Start: 2023-02-08 | End: 2024-03-22

## 2023-02-16 RX ORDER — GABAPENTIN 300 MG/1
300 CAPSULE ORAL 3 TIMES DAILY
Qty: 90 CAPSULE | Refills: 5 | Status: SHIPPED | OUTPATIENT
Start: 2023-02-16 | End: 2023-02-17

## 2023-02-16 NOTE — GROUP NOTE
Psychoeducation Group Note    Service Modality:  Video Visit     Telemedicine Visit: The patient's condition can be safely assessed and treated via synchronous audio and visual telemedicine encounter.      Reason for Telemedicine Visit: Patient has requested telehealth visit, Patient unable to travel, Patient convenience (e.g. access to timely appointments / distance to available provider), Patient lives in a designated Health Professional Shortage Area (HPSA), and Services only offered telehealth    Originating Site (Patient Location): Patient's home    Distant Site (Provider Location): remote site at therapist's home    Consent:  The patient/guardian has verbally consented to: the potential risks and benefits of telemedicine (video visit) versus in person care; bill my insurance or make self-payment for services provided; and responsibility for payment of non-covered services.     Patient would like the video invitation sent by:  My Chart    Mode of Communication:  Video Conference via Medical Zoom    As the provider I attest to compliance with applicable laws and regulations related to telemedicine.     PATIENT'S NAME: Maddy Ortiz  MRN:   [id number]  :   1985  ACCT. NUMBER: [id number]  DATE OF SERVICE: 23  START TIME:  2:00 PM  END TIME:  2:50 PM  FACILITATOR: Nely Arthur PsyD; Shirley Cooper RN  TOPIC:  Wellness Group: Medication Education and Management  [facility] Day Treatment  TRACK: 6B    NUMBER OF PARTICIPANTS: 8    Summary of Group / Topics Discussed:  Medication Educations and Management:  Medication Jeopardy: Patients provided education regarding medication safety, antidepressants, side effects, neuroleptics, expected medication outcomes, knowledge of diagnosis, symptoms, and symptom management through an engaging jeopardy-style format.     Patient Session Goals / Objectives:    ? Participated in team-based Jeopardy game  ? Identified strategies for safe  use, handling, and disposal of medications  ? Discussed basic aspects of medication safety, side effects, adverse outcomes and contraindications      Patient Participation / Response:  Fully participated with the group by sharing personal reflections / insights and openly received / provided feedback with other participants.     Identified / Expressed personal readiness to practice skills  Maddy discussed her apartment and her symptoms with others of some psychosis. She talked about Gabapentin.  Treatment Plan:  Patient has a current master individualized treatment plan.  See Epic treatment plan for more information.    Kait Duke Psy., DARNELL,  Licensed Clinical Psychologist

## 2023-02-16 NOTE — TELEPHONE ENCOUNTER
I have printed off the forms...  [facility] reward forms and routed to Shirley Freeman to hold for 2/27 appt. .  1. Annual wellness visit in 2023   2. Urine 3. Blood glucose/A1C

## 2023-02-16 NOTE — PROGRESS NOTES
Maddy is a 37 year old who is being evaluated via a billable video visit.      How would you like to obtain your AVS? MyChart  If the video visit is dropped, the invitation should be resent by: Text to cell phone: [phone]  Will anyone else be joining your video visit? No          Assessment & Plan     Peripheral polyneuropathy  Well controlled.  - gabapentin (NEURONTIN) 300 MG capsule; Take 1 capsule (300 mg) by mouth 3 times daily      The risks, benefits and treatment options of prescribed medications or other treatments have been discussed with the patient. The patient verbalized their understanding and should call or follow up if no improvement or if they develop further problems.    PHILL Luo CNP  [facility]          Subjective   Maddy is a 37 year old, presenting for the following health issues:  Recheck Medication (Gabapentin;  )      History of Present Illness       Reason for visit:  Nerve pain - started gabapentin again with doctor's last ok but it has been 9 months - need to check in with neurologist again  Symptoms include:  Patient was prescribed this medication by a specialists in [place]-  has decided to restart this medication and needs a referral to see spcialists again.    She eats 2-3 servings of fruits and vegetables daily.She consumes 0 sweetened beverage(s) daily.She exercises with enough effort to increase her heart rate 10 to 19 minutes per day.  She exercises with enough effort to increase her heart rate 3 or less days per week. She is missing 2 dose(s) of medications per week.  She is not taking prescribed medications regularly due to remembering to take.     Patient states that the neurologist told her that she has neuropathy from her diabetes and her obesity.  Patient states that the gabapentin is working well  Happy with the dose  No side effects  Asking for refills          Review of Systems   Constitutional, HEENT, cardiovascular, pulmonary, gi and  gu systems are negative, except as otherwise noted.      Objective           Vitals:  No vitals were obtained today due to virtual visit.    Physical Exam   GENERAL: Healthy, alert and no distress  EYES: Eyes grossly normal to inspection.  No discharge or erythema, or obvious scleral/conjunctival abnormalities.  RESP: No audible wheeze, cough, or visible cyanosis.  No visible retractions or increased work of breathing.    SKIN: Visible skin clear. No significant rash, abnormal pigmentation or lesions.  NEURO: Cranial nerves grossly intact.  Mentation and speech appropriate for age.  PSYCH: Mentation appears normal, affect normal/bright, judgement and insight intact, normal speech and appearance well-groomed.                Video-Visit Details    Type of service:  Video Visit   Video Start Time: 11:33 AM  Video End Time:11:40 AM    Originating Location (pt. Location): Home    Distant Location (provider location):  On-site  Platform used for Video Visit: [employer]

## 2023-02-16 NOTE — GROUP NOTE
Psychoeducation Group Note    PATIENT'S NAME: Maddy Ortiz  MRN:   [id number]  :   1985  ACCT. NUMBER: [id number]  DATE OF SERVICE: 23  START TIME:  3:00 PM  END TIME:  3:50 PM  FACILITATOR: Demetria Cage LICSW; Johnny Maldonado, OTR/L  TOPIC: MH Life Skills Group: Resiliency Development  Service Modality:  Video Visit     Telemedicine Visit: The patient's condition can be safely assessed and treated via synchronous audio and visual telemedicine encounter.       Reason for Telemedicine Visit: Services only offered telehealth and due to COVID-19.     Originating Site (Patient Location): Patient's home     Distant Site (Provider Location): Provider Remote Setting- Home Office     Consent:  The patient/guardian has verbally consented to: the potential risks and benefits of telemedicine (video visit) versus in person care; bill my insurance or make self-payment for services provided; and responsibility for payment of non-covered services.      Patient would like the video invitation sent by:  My Chart     Mode of Communication:  Video Conference via Medical Zoom     As the provider I attest to compliance with applicable laws and regulations related to telemedicine.    [facility] Adult Mental Health Day Treatment  TRACK: 6B    NUMBER OF PARTICIPANTS: 6    Summary of Group / Topics Discussed:  Resiliency Development:  Coping Skills (Mental Health  Check In): Patients were taught how to identify stressors, signs of stress, coping skills, and prevention strategies for overall stress management.  Patients were given the opportunity to identify both ongoing and acute mental health symptoms and how to effectively manage these symptoms by developing an effective aftercare plan.  Patients increased awareness of community based resources.    Patient Session Goals / Objectives:    Identified how using coping skills can be used for symptom and stress management       Improved awareness of individualed  symptoms and stressors and how to effectively cope     Established a relapse prevention plan to practice these skills in their own environments    Practiced and reflected on how to generalize taught skills to their everyday life    Educated about community resources including how to register for PARESH support groups online.        Patient Participation / Response:  Fully participated with the group by sharing personal reflections / insights and openly received / provided feedback with other participants.    Verbalized understanding of content    Treatment Plan:  Patient has a current master individualized treatment plan.  See Epic treatment plan for more information.    Demetria Cage, NELLIESW

## 2023-02-17 ENCOUNTER — TELEPHONE (OUTPATIENT)
Dept: NURSING | Facility: CLINIC | Age: 38
End: 2023-02-17
Payer: COMMERCIAL

## 2023-02-17 DIAGNOSIS — G62.9 PERIPHERAL POLYNEUROPATHY: ICD-10-CM

## 2023-02-17 RX ORDER — GABAPENTIN 300 MG/1
600 CAPSULE ORAL AT BEDTIME
Qty: 60 CAPSULE | Refills: 5 | Status: SHIPPED | OUTPATIENT
Start: 2023-02-17 | End: 2024-08-02

## 2023-02-17 NOTE — TELEPHONE ENCOUNTER
I would like her to change the gabapentin to take 2 capsules (600 mg) just at bedtime.  She should not take any during the day.  This should help with her daytime fatigue.  I sent a new prescription in.  RN may notify home care.  Shirley Freeman, CNP

## 2023-02-17 NOTE — TELEPHONE ENCOUNTER
Attempted to notify pt, no answer and VM box has not been set up; writer unable to leave a message, will need to recall. Called home care, and VM left for [facility] DON to return call to clinic re: this pt. Flagging for [facility], to fax updated med list to home care at number in previous notes.    Mela Plasencia RN  [facility]

## 2023-02-17 NOTE — TELEPHONE ENCOUNTER
"Maddy is requesting a message to her provider, Shirley Freeman    She wants to know if her new Rx Gabapentin could be prescribed differently so that she does not fall asleep during the day.    She fell asleep ~3-4 pm today - \"I was exhausted\" - and woke up tonight at 11 pm. She is unable to go back to sleep.    She is also asking if updated medication instructions could be sent to her Home Care nurse from [facility]    [facility]  Address: [address] [place] [address]  Phone: [phone]  Fax: [phone]    Bekah Franco RN  [facility]          "

## 2023-02-18 ENCOUNTER — MEDICAL CORRESPONDENCE (OUTPATIENT)
Dept: HEALTH INFORMATION MANAGEMENT | Facility: CLINIC | Age: 38
End: 2023-02-18

## 2023-02-20 ENCOUNTER — HOSPITAL ENCOUNTER (OUTPATIENT)
Dept: BEHAVIORAL HEALTH | Facility: CLINIC | Age: 38
Discharge: HOME | End: 2023-02-20
Attending: PSYCHIATRY & NEUROLOGY
Payer: COMMERCIAL

## 2023-02-20 PROCEDURE — 90853 GROUP PSYCHOTHERAPY: CPT | Mod: GT | Performed by: PSYCHOLOGIST

## 2023-02-20 NOTE — GROUP NOTE
Psychoeducation Group Note                                    Service Modality:  Video Visit     Telemedicine Visit: The patient's condition can be safely assessed and treated via synchronous audio and visual telemedicine encounter.      Reason for Telemedicine Visit: Patient has requested telehealth visit, Patient unable to travel, Patient convenience (e.g. access to timely appointments / distance to available provider), Patient lives in a designated Health Professional Shortage Area (HPSA), and Services only offered telehealth    Originating Site (Patient Location): Patient's home    Distant Site (Provider Location): [facility] Hospital: [facility], [facility]    Consent:  The patient/guardian has verbally consented to: the potential risks and benefits of telemedicine (video visit) versus in person care; bill my insurance or make self-payment for services provided; and responsibility for payment of non-covered services.     Patient would like the video invitation sent by:  My Chart    Mode of Communication:  Video Conference via Medical Zoom    As the provider I attest to compliance with applicable laws and regulations related to telemedicine.        PATIENT'S NAME: Maddy Ortiz  MRN:   [id number]  :   1985  ACCT. NUMBER: [id number]  DATE OF SERVICE: 23  START TIME:  2:00 PM  END TIME:  2:50 PM  FACILITATOR: Nely Arthur PsyD; Shirley Cooper RN  TOPIC:  Wellness Group: Health Maintenance  [facility] Adult Mental Health Day Treatment  TRACK:  6B    NUMBER OF PARTICIPANTS: 3      Summary of Group / Topics Discussed:  Health Maintenance: Goal Setting: Meaningful goals can bring a sense of direction and purpose in life.  They also highlight our most important values. Patients were assisted by instructor to identify short term goals to promote their mental health recovery and improve overall health and wellness. Patients were educated on SMART goal setting framework as a strategy to  increase outcomes and promote success.    Patient Session Goals / Objectives:  ? Explained the key concepts of SMART goal setting framework  ? Identified three goals successfully using SMART goal setting framework  ? Reviewed concept of balance in wellness as it pertains to goal setting        Patient Participation / Response:  Fully participated with the group by sharing personal reflections / insights and openly received / provided feedback with other participants.    Identified / Expressed personal readiness to practice skills  Maddy discussed relationships with the group and listened to others talk about similar issues.   Treatment Plan:  Patient has a current master individualized treatment plan.  See Epic treatment plan for more information.    Kait Duke Psy., D,  Licensed Clinical Psychologist

## 2023-02-20 NOTE — GROUP NOTE
Process Group Note                                    Service Modality:  Video Visit     Telemedicine Visit: The patient's condition can be safely assessed and treated via synchronous audio and visual telemedicine encounter.      Reason for Telemedicine Visit: Patient has requested telehealth visit, Patient unable to travel, Patient convenience (e.g. access to timely appointments / distance to available provider), Patient lives in a designated Health Professional Shortage Area (HPSA), and Services only offered telehealth    Originating Site (Patient Location): Patient's home    Distant Site (Provider Location): [facility] Hospital: [facility], [facility]    Consent:  The patient/guardian has verbally consented to: the potential risks and benefits of telemedicine (video visit) versus in person care; bill my insurance or make self-payment for services provided; and responsibility for payment of non-covered services.     Patient would like the video invitation sent by:  My Chart    Mode of Communication:  Video Conference via Medical Zoom    As the provider I attest to compliance with applicable laws and regulations related to telemedicine.        PATIENT'S NAME: Maddy Ortiz  MRN:   [id number]  :   1985  ACCT. NUMBER: [id number]  DATE OF SERVICE: 23  START TIME:  1:00 PM  END TIME:  1:50 PM  FACILITATOR: Nely Arthur PsyD  TOPIC:  Process Group    Diagnoses:  295.70  (F25) Schizoaffective Disorder Bipolar Type     300.02 (F41.1) Generalized Anxiety Disorder     Patient reported history of diagnoses: ADHD; an anxiety disorder; a bipolar disorder; depression; an eating disorder; a personality disorder; PTSD; schizophrenia.    Psychiatry:  Shirley at [facility]  therapist  [facility]  [place] Primary Care Provider  Shirley [facility].   [facility] Worker with [facility]   with [facility]   [facility] [place] worker twice per week,      [facility] Adult  "Mental Health Day Treatment  TRACK: 6B    NUMBER OF PARTICIPANTS: 3          Data:    Session content: At the start of this group, patients were invited to check in by identifying themselves, describing their current emotional status, and identifying issues to address in this group.   Area(s) of treatment focus addressed in this session included Personal Safety, Community Resources/Discharge Planning and Abstinence/Relapse Prevention.  Client reported being safe today.  Reported mood is upset.    Goal for today is to attend group therapy online and talk with others. The client talked to the group about how she felt lonely, exhausted, worried, and afraid of rejection since she had an argument with a friend.  She reported that she set boundaries with him and felt like he is talking about her as a girlfriend, but she sees herself as a friend. She talked with others about it and said she has trouble with other relationships and is worried that they will think she is \"doing something wrong.\" She reported that she felt lonely and talked to others about meeting friends.  She reported that she will call her dad and talk about something that she wanted him to make. She talked about how her sister was a support for her and in the past her family was supportive.  She listened to others talk about how may have done something wrong and is worried. She sounded worried and was tearful.      Therapeutic Interventions/Treatment Strategies:  Psychotherapist reinforced use of skills. Treatment modalities used include Cognitive Behavioral Therapy and Dialectical Behavioral Therapy. Interventions include Coping Skills: Assisted patient in understanding the purpose of planning / creating / participating / sharing in positive experiences, Relapse Prevention: Facilitated understanding of effective coping skills in response to triggers for substance use, Mindfulness: Encouraged a plan to use mindfulness skills in daily life and Symptoms " Management: Promoted understanding of their diagnoses and how it impacts their functioning.    Assessment:    Patient response:   Patient responded to session by giving feedback, listening and being attentive    Possible barriers to participation / learning include: severity of symptoms    Health Issues:   None reported       Substance Use Review:   Substance Use: No active concerns identified.    Mental Status/Behavioral Observations  Appearance:   Appropriate   Eye Contact:   Good   Psychomotor Behavior: Normal   Attitude:   Cooperative   Orientation:   All  Speech   Rate / Production: Normal    Volume:  Normal   Mood:    Anxious  Depressed  Sad   Affect:    Constricted   Thought Content:   Rumination and Psychosis reports paranoia  Thought Form:  Coherent  Logical  Psychosis    Insight:    Fair     Plan:     Safety Plan: Recommended that patient call 911 or go to the local ED should there be a change in any of these risk factors.     Barriers to treatment: None identified    Patient Contracts (see media tab):  None    Substance Use: Provided encouragement towards sobriety     Continue or Discharge: Patient will continue in Adult Day Treatment (ADT)  as planned. Patient is likely to benefit from learning and using skills as they work toward the goals identified in their treatment plan.      Nely Arthur PsyD  February 20, 2023  Dariusz Spaulding D,  Licensed Clinical Psychologist

## 2023-02-21 ENCOUNTER — HOSPITAL ENCOUNTER (OUTPATIENT)
Dept: BEHAVIORAL HEALTH | Facility: CLINIC | Age: 38
Discharge: HOME | End: 2023-02-21
Attending: PSYCHIATRY & NEUROLOGY
Payer: COMMERCIAL

## 2023-02-21 ENCOUNTER — TELEPHONE (OUTPATIENT)
Dept: BEHAVIORAL HEALTH | Facility: CLINIC | Age: 38
End: 2023-02-21
Payer: COMMERCIAL

## 2023-02-21 PROCEDURE — 90853 GROUP PSYCHOTHERAPY: CPT | Mod: GT | Performed by: PSYCHOLOGIST

## 2023-02-21 NOTE — GROUP NOTE
Process Group Note                                    Service Modality:  Video Visit     Telemedicine Visit: The patient's condition can be safely assessed and treated via synchronous audio and visual telemedicine encounter.      Reason for Telemedicine Visit: Patient has requested telehealth visit, Patient unable to travel, Patient convenience (e.g. access to timely appointments / distance to available provider), Patient lives in a designated Health Professional Shortage Area (HPSA), and Services only offered telehealth    Originating Site (Patient Location): Patient's home    Distant Site (Provider Location): [facility] Hospital: [facility], [facility]    Consent:  The patient/guardian has verbally consented to: the potential risks and benefits of telemedicine (video visit) versus in person care; bill my insurance or make self-payment for services provided; and responsibility for payment of non-covered services.     Patient would like the video invitation sent by:  My Chart    Mode of Communication:  Video Conference via Medical Zoom    As the provider I attest to compliance with applicable laws and regulations related to telemedicine.        PATIENT'S NAME: Maddy Ortiz  MRN:   [id number]  :   1985  ACCT. NUMBER: [id number]  DATE OF SERVICE: 23  START TIME:  1:00 PM  END TIME:  1:50 PM  FACILITATOR: Nely Arthur PsyD  TOPIC:  Process Group    Diagnoses:  295.70  (F25) Schizoaffective Disorder Bipolar Type     300.02 (F41.1) Generalized Anxiety Disorder     Patient reported history of diagnoses: ADHD; an anxiety disorder; a bipolar disorder; depression; an eating disorder; a personality disorder; PTSD; schizophrenia.    Psychiatry:  Shirley at [facility]  therapist  [facility]  [facility] Primary Care Provider  Shirley [facility].   [facility] Worker with [facility]   with [facility]   [facility] worker twice per week,      [facility] Adult  "Mental Health Day Treatment  TRACK: 6B    NUMBER OF PARTICIPANTS: 3          Data:    Session content: At the start of this group, patients were invited to check in by identifying themselves, describing their current emotional status, and identifying issues to address in this group.   Area(s) of treatment focus addressed in this session included Symptom Management, Personal Safety and Community Resources/Discharge Planning.  Client reported being safe today.  Reported mood is \"exhausted\" and anxious.   Goal for today is to attend group therapy online and talk with others. The client talked to the group about how she wrote letters to [facility] about services and wrote another letter to her old college and talked about resources for them. She stated that she wrote her opinions and later said that she wrote what she thought they would want to hear.  She talked to the group about her ideas for services and about being a minority. She reported that she talked to her brother and family too about respect. She stated that she wanted to share opinions and increase her awareness. She reported that her friend was playing the role of an evil person and that they had an argument recently.      Therapeutic Interventions/Treatment Strategies:  Psychotherapist reinforced use of skills. Treatment modalities used include Cognitive Behavioral Therapy and Dialectical Behavioral Therapy. Interventions include Relapse Prevention: Facilitated understanding of effective coping skills in response to triggers for substance use, Mindfulness: Facilitated discussion of when/how to use mindfulness skills to benefit general health, mental health symptoms, and stressors, Symptoms Management: Promoted understanding of their diagnoses and how it impacts their functioning and Emotions Management:  Discussed barriers to emotional regulation and Reviewed opposite action skill.    Assessment:    Patient response:   Patient responded to session by accepting " feedback, giving feedback and listening    Possible barriers to participation / learning include: severity of symptoms    Health Issues:   None reported       Substance Use Review:   Substance Use: No active concerns identified.    Mental Status/Behavioral Observations  Appearance:   Appropriate   Eye Contact:   Good   Psychomotor Behavior: Normal   Attitude:   Cooperative   Orientation:   All  Speech   Rate / Production: Normal    Volume:  Normal   Mood:    Anxious   Affect:    Constricted   Thought Content:   Rumination and tangential  Thought Form:  Coherent  Logical  Tangential     Insight:    Good  and Fair     Plan:     Safety Plan: Recommended that patient call 911 or go to the local ED should there be a change in any of these risk factors.     Barriers to treatment: None identified    Patient Contracts (see media tab):  None    Substance Use: Provided encouragement towards sobriety     Continue or Discharge: Patient will continue in Adult Day Treatment (ADT)  as planned. Patient is likely to benefit from learning and using skills as they work toward the goals identified in their treatment plan.      Nely Arthur PsyD  February 21, 2023  Dariusz Spaulding, D,  Licensed Clinical Psychologist

## 2023-02-21 NOTE — GROUP NOTE
Psychotherapy Group Note                                    Service Modality:  Video Visit     Telemedicine Visit: The patient's condition can be safely assessed and treated via synchronous audio and visual telemedicine encounter.      Reason for Telemedicine Visit: Patient has requested telehealth visit, Patient unable to travel, Patient convenience (e.g. access to timely appointments / distance to available provider), Patient lives in a designated Health Professional Shortage Area (HPSA), and Services only offered telehealth    Originating Site (Patient Location): Patient's home    Distant Site (Provider Location): [facility] Hospital: [facility], [facility]    Consent:  The patient/guardian has verbally consented to: the potential risks and benefits of telemedicine (video visit) versus in person care; bill my insurance or make self-payment for services provided; and responsibility for payment of non-covered services.     Patient would like the video invitation sent by:  My Chart    Mode of Communication:  Video Conference via Medical Zoom    As the provider I attest to compliance with applicable laws and regulations related to telemedicine.        PATIENT'S NAME: Maddy Ortiz  MRN:   [id number]  :   1985  ACCT. NUMBER: [id number]  DATE OF SERVICE: 23  START TIME:  2:00 PM  END TIME:  2:50 PM  FACILITATOR: Nely Arthur PsyD  TOPIC:  EBP Group: Relationship Skills  [facility] Adult Mental Health Day Treatment  TRACK: 6B    NUMBER OF PARTICIPANTS: 3    Summary of Group / Topics Discussed:  Relationship Skills: Conflict Resolution: Topic of conflict resolution in interpersonal communication was discussed. Patients received an overview of how communication skills during times of conflict impact symptoms and functioning. Patients discussed their current communication challenges and how this impacts their functioning. Patients also verbalized understanding of skills learned and practiced  in session.     Patient Session Goals / Objectives:    Identified and discussed patient individual challenges with communication    Presented and practiced effective communication skills in session    Assisted patients in implementing more effective communication skills in their relationships      Patient Participation / Response:  Fully participated with the group by sharing personal reflections / insights and openly received / provided feedback with other participants.    Identified / Expressed personal readiness to incorporate effective communication skills  Maddy discussed her friend and how she felt he wasn't being reliable lately and that they had an argument.  Treatment Plan:  Patient has a current master individualized treatment plan.  See Epic treatment plan for more information.    Kait Duke Psy., D,  Licensed Clinical Psychologist

## 2023-02-23 ENCOUNTER — HOSPITAL ENCOUNTER (OUTPATIENT)
Dept: BEHAVIORAL HEALTH | Facility: CLINIC | Age: 38
Discharge: HOME | End: 2023-02-23
Attending: PSYCHIATRY & NEUROLOGY
Payer: COMMERCIAL

## 2023-02-23 PROCEDURE — 90853 GROUP PSYCHOTHERAPY: CPT | Mod: GT | Performed by: PSYCHOLOGIST

## 2023-02-23 PROCEDURE — 90853 GROUP PSYCHOTHERAPY: CPT | Mod: 95 | Performed by: PSYCHOLOGIST

## 2023-02-23 PROCEDURE — 90853 GROUP PSYCHOTHERAPY: CPT | Mod: 95 | Performed by: SOCIAL WORKER

## 2023-02-23 NOTE — GROUP NOTE
Psychoeducation Group Note    PATIENT'S NAME: Maddy Ortiz  MRN:   [id number]  :   1985  ACCT. NUMBER: [id number]  DATE OF SERVICE: 23  START TIME:  3:00 PM  END TIME:  3:50 PM  FACILITATOR: Demetria Cage, SABRA; Johnny Maldonado, OTR/L  TOPIC: MH Life Skills Group: Lifestyle Balance and Structure  Service Modality:  Video Visit     Telemedicine Visit: The patient's condition can be safely assessed and treated via synchronous audio and visual telemedicine encounter.       Reason for Telemedicine Visit: Services only offered telehealth and due to COVID-19.     Originating Site (Patient Location): Patient's home     Distant Site (Provider Location): Provider Remote Setting- Home Office     Consent:  The patient/guardian has verbally consented to: the potential risks and benefits of telemedicine (video visit) versus in person care; bill my insurance or make self-payment for services provided; and responsibility for payment of non-covered services.      Patient would like the video invitation sent by:  My Chart     Mode of Communication:  Video Conference via Medical Zoom     As the provider I attest to compliance with applicable laws and regulations related to telemedicine.    [facility] Adult Mental Health Day Treatment  TRACK: 6B    NUMBER OF PARTICIPANTS: 2    Summary of Group / Topics Discussed:  Lifestyle Balance and Strucure:  Routines, Habits, Rituals, and Roles (Vision Statements): Patients were assisted to identify meaningful roles that they want to promote and the impact their mental health symptoms have on this.  Patients learned, applied, and generalized skills needed to live and participate in meaningful roles as effectively and independently as possible.  Patients developed awareness of strengths and challenges in fulfilling these roles and worked on integrating specific and individualized rituals and habits into their daily life.     Patient Session Goals /  "Objectives:    Improved awareness and engagement in life s meaningful roles, routines, habits, and rituals    Explored and identified current roles and challenges due to mental health symptoms     Identified ways to establish and integrate daily self care and wellness routines and habits to support mental health recovery    Practiced and reflected on how to generalize taught skills to their everyday life    Education through video, \" Picture Your [place] Mental Health by Creating A Vision of Wellness\" by Douglas Bloch      Patient Participation / Response:  Fully participated with the group by sharing personal reflections / insights and openly received / provided feedback with other participants.    Verbalized understanding of content    Treatment Plan:  Patient has a current master individualized treatment plan.  See Epic treatment plan for more information.    Demetria Cage, [facility] Medical CenterSW      "

## 2023-02-23 NOTE — GROUP NOTE
Psychoeducation Group Note    Service Modality:  Video Visit     Telemedicine Visit: The patient's condition can be safely assessed and treated via synchronous audio and visual telemedicine encounter.      Reason for Telemedicine Visit: Patient has requested telehealth visit, Patient unable to travel, Patient convenience (e.g. access to timely appointments / distance to available provider), Patient lives in a designated Health Professional Shortage Area (HPSA), and Services only offered telehealth    Originating Site (Patient Location): Patient's home    Distant Site (Provider Location): remote site at therapist's home    Consent:  The patient/guardian has verbally consented to: the potential risks and benefits of telemedicine (video visit) versus in person care; bill my insurance or make self-payment for services provided; and responsibility for payment of non-covered services.     Patient would like the video invitation sent by:  My Chart    Mode of Communication:  Video Conference via Medical Zoom    As the provider I attest to compliance with applicable laws and regulations related to telemedicine.     PATIENT'S NAME: Maddy Ortiz  MRN:   [id number]  :   1985  ACCT. NUMBER: [id number]  DATE OF SERVICE: 23  START TIME:  2:00 PM  END TIME:  2:50 PM  FACILITATOR: Nely Arthur PsyD  TOPIC:  Wellness Group: Health Maintenance  [facility] Adult Mental Health Day Treatment  TRACK: 6B    NUMBER OF PARTICIPANTS: 2    Summary of Group / Topics Discussed:  Health Maintenance: Wellness Check-in: Patients met with group facilitator to individually review a holistic wellness check-in to assess patient medication adherence/concerns, appointments, physical and mental health, exercise, nutrition, sleep, socialization, substance use, and need for service/resource referrals.       Patient Session Goals / Objectives:    Discussed various aspects of health management and self-care related to physical and  mental health    Demonstrated increased self-awareness of current wellness needs    Developed health literacy skills in navigating the healthcare system and self-advocacy/communicating needs with health care team        Patient Participation / Response:  Fully participated with the group by sharing personal reflections / insights and openly received / provided feedback with other participants.    Demonstrated understanding of topics discussed through group discussion and participation  Maddy talked about drowsiness and medication changes and feels frustrated with it and feels it takes her from caring for her daughter. She reported feeling emotionally exhausted from the medication changes and that it interferes with her parenting. She reported that she was confronted with her delusions and talked about punishment and wanting to say the right things.    Treatment Plan:  Patient has a current master individualized treatment plan.  See Epic treatment plan for more information.    Kait Duke Psy., D,  Licensed Clinical Psychologist

## 2023-02-23 NOTE — GROUP NOTE
Process Group Note    Service Modality:  Video Visit     Telemedicine Visit: The patient's condition can be safely assessed and treated via synchronous audio and visual telemedicine encounter.      Reason for Telemedicine Visit: Patient has requested telehealth visit, Patient unable to travel, Patient convenience (e.g. access to timely appointments / distance to available provider), Patient lives in a designated Health Professional Shortage Area (HPSA), and Services only offered telehealth    Originating Site (Patient Location): Patient's home    Distant Site (Provider Location): remote site at therapist's home    Consent:  The patient/guardian has verbally consented to: the potential risks and benefits of telemedicine (video visit) versus in person care; bill my insurance or make self-payment for services provided; and responsibility for payment of non-covered services.     Patient would like the video invitation sent by:  My Chart    Mode of Communication:  Video Conference via Medical Zoom    As the provider I attest to compliance with applicable laws and regulations related to telemedicine.     PATIENT'S NAME: Maddy Ortiz  MRN:   [id number]  :   1985  ACCT. NUMBER: [id number]  DATE OF SERVICE: 23  START TIME:  1:00 PM  END TIME:  1:50 PM  FACILITATOR: Nely Arthur PsyD  TOPIC:  Process Group    Diagnoses:  295.70  (F25) Schizoaffective Disorder Bipolar Type     300.02 (F41.1) Generalized Anxiety Disorder     Patient reported history of diagnoses: ADHD; an anxiety disorder; a bipolar disorder; depression; an eating disorder; a personality disorder; PTSD; schizophrenia.    Psychiatry:  Shirley at [facility]  therapist  [facility]  [place] Primary Care Provider  Shirley Freeman.   [facility] Worker with [facility]   with [facility]   [facility] worker twice per week,      [facility] Adult Mental Health Day Treatment  TRACK: 6B    NUMBER OF  "PARTICIPANTS: 3          Data:    Session content: At the start of this group, patients were invited to check in by identifying themselves, describing their current emotional status, and identifying issues to address in this group.   Area(s) of treatment focus addressed in this session included Symptom Management, Personal Safety and Community Resources/Discharge Planning.  Client reported being safe today.  Reported mood is \"fatigued, irritable, and depressed.\"    Goal for today is to attend online groups and talk with others. The client talked to the group about how she was writing about her opinions and that it helps her express her opinions. She reported that she wants to \"say the right thing because she is writing to people with more power and talked to the group about disruptions.\"  She reported that she does mindfulness or does exercise when she is not writing or re-decorating her apartment. She reported that she has had problems with special messages from electronics and it gives her anxiety. She reported frightened about anticipation of things to come. She talked about past events and feeling frustrated with her ex-.       Therapeutic Interventions/Treatment Strategies:  Psychotherapist reinforced use of skills. Treatment modalities used include Cognitive Behavioral Therapy and Dialectical Behavioral Therapy. Interventions include Coping Skills: Discussed meditation skills and addressed ways to implement meditation skills , Relapse Prevention: Facilitated understanding of effective coping skills in response to triggers for substance use, Mindfulness: Encouraged a plan to use mindfulness skills in daily life and Symptoms Management: Promoted understanding of their diagnoses and how it impacts their functioning.    Assessment:    Patient response:   Patient responded to session by focusing on goals, being attentive and appearing alert    Possible barriers to participation / learning include: severity " of symptoms    Health Issues:   None reported       Substance Use Review:   Substance Use: No active concerns identified.    Mental Status/Behavioral Observations  Appearance:   Appropriate   Eye Contact:   Good   Psychomotor Behavior: Normal   Attitude:   Cooperative   Orientation:   All  Speech   Rate / Production: Normal    Volume:  Normal   Mood:    Anxious  Depressed  Sad   Affect:    Constricted   Thought Content:   Rumination and Psychosis reports paranoia and preservative thoughts  Thought Form:  Coherent  Logical  Psychosis    Insight:    Good     Plan:     Safety Plan: Recommended that patient call 911 or go to the local ED should there be a change in any of these risk factors.     Barriers to treatment: None identified    Patient Contracts (see media tab):  None    Substance Use: Provided encouragement towards sobriety     Continue or Discharge: Patient will continue in Adult Day Treatment (ADT)  as planned. Patient is likely to benefit from learning and using skills as they work toward the goals identified in their treatment plan.      Nely Arthur PsyD  February 23, 2023  Dariusz Spaulding, DARNELL,  Licensed Clinical Psychologist

## 2023-02-27 ENCOUNTER — MYC MEDICAL ADVICE (OUTPATIENT)
Dept: FAMILY MEDICINE | Facility: CLINIC | Age: 38
End: 2023-02-27

## 2023-02-27 ENCOUNTER — HOSPITAL ENCOUNTER (OUTPATIENT)
Dept: BEHAVIORAL HEALTH | Facility: CLINIC | Age: 38
Discharge: HOME | End: 2023-02-27
Attending: PSYCHIATRY & NEUROLOGY
Payer: COMMERCIAL

## 2023-02-27 PROCEDURE — 90853 GROUP PSYCHOTHERAPY: CPT | Mod: 95 | Performed by: PSYCHOLOGIST

## 2023-02-27 PROCEDURE — 90853 GROUP PSYCHOTHERAPY: CPT | Mod: GT | Performed by: PSYCHOLOGIST

## 2023-02-27 NOTE — GROUP NOTE
Psychoeducation Group Note    Service Modality:  Video Visit     Telemedicine Visit: The patient's condition can be safely assessed and treated via synchronous audio and visual telemedicine encounter.      Reason for Telemedicine Visit: Patient has requested telehealth visit, Patient unable to travel, Patient convenience (e.g. access to timely appointments / distance to available provider), Patient lives in a designated Health Professional Shortage Area (HPSA), and Services only offered telehealth    Originating Site (Patient Location): Patient's home    Distant Site (Provider Location): remote site at therapist's home    Consent:  The patient/guardian has verbally consented to: the potential risks and benefits of telemedicine (video visit) versus in person care; bill my insurance or make self-payment for services provided; and responsibility for payment of non-covered services.     Patient would like the video invitation sent by:  My Chart    Mode of Communication:  Video Conference via Medical Zoom    As the provider I attest to compliance with applicable laws and regulations related to telemedicine.       PATIENT'S NAME: Maddy Ortiz  MRN:   [id number]  :   1985  ACCT. NUMBER: [id number]  DATE OF SERVICE: 23  START TIME:  2:00 PM  END TIME:  2:50 PM  FACILITATOR: Nely Arthur PsyD Hermann, Jessica  TOPIC:  Wellness Group: [facility] Adult Mental Health Day Treatment  TRACK: 6B    NUMBER OF PARTICIPANTS: 4    Summary of Group / Topics Discussed:  Foundations of Health: Nutrition: Macronutrients: Patient were provided education on major macronutrients, their role in the body, and why it is important to meet daily nutritional needs. Obstacles to meeting daily nutritional needs were identified in group discussion and strategies to promote improved nutrition were explored. Macronutrients discussed include: carbohydrates, proteins and amino acids, fats, fiber, and water.      Patient Session Goals / Objectives:  ? Discussed the role of diet on mood, physical health, energy level, and the development of chronic disease.  ? Identified daily nutritional needs recommended by the USDA via My Plate education resources  ? Developing increased health literacy skills in finding credible nutrition information from reliable sources      Patient Participation / Response:  Fully participated with the group by sharing personal reflections / insights and openly received / provided feedback with other participants.    Demonstrated understanding of topics discussed through group discussion and participation  Maddy talked to the group about organizing her apartment and making it look updated and modern. She reported that she set up a budget with a worker and will pay bills.   Treatment Plan:  Patient has a current master individualized treatment plan.  See Epic treatment plan for more information.    Kait Duke Psy., D,  Licensed Clinical Psychologist

## 2023-02-27 NOTE — GROUP NOTE
Psychoeducation Group Note    Service Modality:  Video Visit     Telemedicine Visit: The patient's condition can be safely assessed and treated via synchronous audio and visual telemedicine encounter.      Reason for Telemedicine Visit: Patient has requested telehealth visit, Patient unable to travel, Patient convenience (e.g. access to timely appointments / distance to available provider), Patient lives in a designated Health Professional Shortage Area (HPSA), and Services only offered telehealth    Originating Site (Patient Location): Patient's home    Distant Site (Provider Location): remote site at therapist's home    Consent:  The patient/guardian has verbally consented to: the potential risks and benefits of telemedicine (video visit) versus in person care; bill my insurance or make self-payment for services provided; and responsibility for payment of non-covered services.     Patient would like the video invitation sent by:  My Chart    Mode of Communication:  Video Conference via Medical Zoom    As the provider I attest to compliance with applicable laws and regulations related to telemedicine.     PATIENT'S NAME: Maddy Ortiz  MRN:   [id number]  :   1985  ACCT. NUMBER: [id number]  DATE OF SERVICE: 23  START TIME:  3:00 PM  END TIME:  3:50 PM  FACILITATOR: Nely Arthur PsyD; Johnny Maldonado, OTR/L  TOPIC:  Life Skills Group: Communication and Social Skills Development  [facility] Adult Mental Health Day Treatment  TRACK: 6B    NUMBER OF PARTICIPANTS: 4    Summary of Group / Topics Discussed:  Communication and Social Skills Development: Communication Styles: Communication Skills Scale:Patients completed a self assessment of personal communication skills by identifying both strengths and opportunities for growth in areas of messages, emotions, assertiveness and listening skills.  Patients gained awareness of effective communication skills to improve overall communication and  connection with other people.      Patient Session Goals / Objectives:    Identified strengths and opportunities for growth in communication skills and how these  impact their ability to communicate clearly with other people       Improved awareness of important aspects of communication skills and how this relates to mental health recovery        Established a plan for practice of these skills in their own environments    Practiced and reflected on how to generalize taught skills to their everyday life      Patient Participation / Response:  Fully participated with the group by sharing personal reflections / insights and openly received / provided feedback with other participants.    Patient would benefit from additional opportunities to practice the content to be able to generalize it to their everyday life with increased intentionality, consistency, and efficacy in support of their psychiatric recovery  Maddy talked about people who may perceive to be passive in communication style but may be depressed or focused on another thing. She talked about different situations and how different situations can create different types of communication.  Treatment Plan:  Patient has a current master individualized treatment plan.  See Epic treatment plan for more information.    Kait Duke Psy., D,  Licensed Clinical Psychologist

## 2023-02-27 NOTE — TELEPHONE ENCOUNTER
See [email or web address] message, routing to PCP for review.    Mela Plasencia RN  [facility]

## 2023-02-27 NOTE — GROUP NOTE
Process Group Note    Service Modality:  Video Visit     Telemedicine Visit: The patient's condition can be safely assessed and treated via synchronous audio and visual telemedicine encounter.      Reason for Telemedicine Visit: Patient has requested telehealth visit, Patient unable to travel, Patient convenience (e.g. access to timely appointments / distance to available provider), Patient lives in a designated Health Professional Shortage Area (HPSA), and Services only offered telehealth    Originating Site (Patient Location): Patient's home    Distant Site (Provider Location): remote site at therapist's home    Consent:  The patient/guardian has verbally consented to: the potential risks and benefits of telemedicine (video visit) versus in person care; bill my insurance or make self-payment for services provided; and responsibility for payment of non-covered services.     Patient would like the video invitation sent by:  My Chart    Mode of Communication:  Video Conference via Medical Zoom    As the provider I attest to compliance with applicable laws and regulations related to telemedicine.     PATIENT'S NAME: Maddy Ortiz  MRN:   [id number]  :   1985  ACCT. NUMBER: [id number]  DATE OF SERVICE: 23  START TIME:  1:00 PM  END TIME:  1:50 PM  FACILITATOR: Nely Arthur PsyD  TOPIC:  Process Group    Diagnoses:  295.70  (F25) Schizoaffective Disorder Bipolar Type     300.02 (F41.1) Generalized Anxiety Disorder     Patient reported history of diagnoses: ADHD; an anxiety disorder; a bipolar disorder; depression; an eating disorder; a personality disorder; PTSD; schizophrenia.    Psychiatry:  Shirley at [facility]  therapist  [facility]  [place] Primary Care Provider  Shirley Freeman.   [facility] Worker with [facility]   with [facility]   [facility] worker twice per week,      [facility] Adult Mental Health Day Treatment  TRACK: 6B    NUMBER OF  ANESTHESIA POSTOP CHECK    29y Female POSTOP DAY 1 S/P     Vital Signs Last 24 Hrs  T(C): 36.6 (03 Jul 2020 05:48), Max: 37.1 (02 Jul 2020 19:30)  T(F): 97.9 (03 Jul 2020 05:48), Max: 98.8 (02 Jul 2020 19:30)  HR: 97 (03 Jul 2020 05:48) (95 - 133)  BP: 123/54 (03 Jul 2020 05:48) (101/53 - 135/73)  BP(mean): 84 (02 Jul 2020 20:00) (61 - 100)  RR: 18 (03 Jul 2020 05:48) (14 - 36)  SpO2: 98% (03 Jul 2020 05:48) (94% - 100%)  I&O's Summary    02 Jul 2020 07:01  -  03 Jul 2020 07:00  --------------------------------------------------------  IN: 6425 mL / OUT: 2807 mL / NET: 3618 mL        [X ] NO APPARENT ANESTHESIA COMPLICATIONS      Comments: PARTICIPANTS: 4          Data:    Session content: At the start of this group, patients were invited to check in by identifying themselves, describing their current emotional status, and identifying issues to address in this group.   Area(s) of treatment focus addressed in this session included Symptom Management, Personal Safety and Community Resources/Discharge Planning.  Client reported being safe today.  Reported mood is ok.    Goal for today is to attend group therapy online and talk with others.The client talked to the group about how she designed a document about her preferences for self-cares and vocations. She shared them with others and talked about how she does mindfulness, journal writing, arts and crafts, taking care of her daughter and her apartment. She stated that she talked with her brothers and sister and her dad and will see some family to go out to a restaurant.   She talked to others about a Borderline Personality disorder diagnosis from High School, but felt that she was more independent and the group discussed how she has learned many skills and the diagnosis may not apply anymore. She talked to the group about how she feels anxiety about social situations and tends to be quiet in those situations.      Therapeutic Interventions/Treatment Strategies:  Psychotherapist reinforced use of skills. Treatment modalities used include Cognitive Behavioral Therapy and Dialectical Behavioral Therapy. Interventions include Coping Skills: Discussed meditation skills and addressed ways to implement meditation skills , Relapse Prevention: Discussed the use of substances and its impact on their relationships, Mindfulness: Facilitated discussion of when/how to use mindfulness skills to benefit general health, mental health symptoms, and stressors and Symptoms Management: Promoted understanding of their diagnoses and how it impacts their functioning.    Assessment:    Patient response:   Patient responded to  session by accepting feedback, giving feedback and listening    Possible barriers to participation / learning include: severity of symptoms    Health Issues:   None reported       Substance Use Review:   Substance Use: No active concerns identified.    Mental Status/Behavioral Observations  Appearance:   Appropriate   Eye Contact:   Good   Psychomotor Behavior: Normal   Attitude:   Cooperative   Orientation:   All  Speech   Rate / Production: Normal    Volume:  Normal   Mood:    Anxious   Affect:    Constricted   Thought Content:   Rumination and Psychosis reports preservative thoughts  Thought Form:  Coherent  Logical  Psychosis    Insight:    Good     Plan:     Safety Plan: Recommended that patient call 911 or go to the local ED should there be a change in any of these risk factors.     Barriers to treatment: None identified    Patient Contracts (see media tab):  None    Substance Use: Provided encouragement towards sobriety     Continue or Discharge: Patient will continue in Adult Day Treatment (ADT)  as planned. Patient is likely to benefit from learning and using skills as they work toward the goals identified in their treatment plan.      Nely Arthur PsyD  February 27, 2023  Dariusz Spaulding, DARNELL,  Licensed Clinical Psychologist

## 2023-02-28 ENCOUNTER — HOSPITAL ENCOUNTER (OUTPATIENT)
Dept: BEHAVIORAL HEALTH | Facility: CLINIC | Age: 38
Discharge: HOME | End: 2023-02-28
Attending: PSYCHIATRY & NEUROLOGY
Payer: COMMERCIAL

## 2023-02-28 PROCEDURE — 90853 GROUP PSYCHOTHERAPY: CPT | Mod: GT | Performed by: PSYCHOLOGIST

## 2023-02-28 PROCEDURE — 90853 GROUP PSYCHOTHERAPY: CPT | Mod: 95 | Performed by: SOCIAL WORKER

## 2023-02-28 NOTE — GROUP NOTE
Process Group Note                                    Service Modality:  Video Visit     Telemedicine Visit: The patient's condition can be safely assessed and treated via synchronous audio and visual telemedicine encounter.      Reason for Telemedicine Visit: Patient has requested telehealth visit, Patient unable to travel, Patient convenience (e.g. access to timely appointments / distance to available provider), Patient lives in a designated Health Professional Shortage Area (HPSA), and Services only offered telehealth    Originating Site (Patient Location): Patient's home    Distant Site (Provider Location): [facility]: [facility]    Consent:  The patient/guardian has verbally consented to: the potential risks and benefits of telemedicine (video visit) versus in person care; bill my insurance or make self-payment for services provided; and responsibility for payment of non-covered services.     Patient would like the video invitation sent by:  My Chart    Mode of Communication:  Video Conference via Medical Zoom    As the provider I attest to compliance with applicable laws and regulations related to telemedicine.        PATIENT'S NAME: Maddy Ortiz  MRN:   [id number]  :   1985  ACCT. NUMBER: [id number]  DATE OF SERVICE: 23  START TIME:  1:00 PM  END TIME:  1:50 PM  FACILITATOR: Nely Arthur PsyD  TOPIC:  Process Group    Diagnoses:  295.70  (F25) Schizoaffective Disorder Bipolar Type     300.02 (F41.1) Generalized Anxiety Disorder     Patient reported history of diagnoses: ADHD; an anxiety disorder; a bipolar disorder; depression; an eating disorder; a personality disorder; PTSD; schizophrenia.    Psychiatry:  Shirley at [facility]  therapist  [facility]  [place] Primary Care Provider  Shirley Freeman.   Counts include [facility] beds at the [facility] Worker with [facility]   with [facility]   [facility] worker twice per week,      [facility] Adult  "Mental Health Day Treatment  TRACK: 6B    NUMBER OF PARTICIPANTS: 5          Data:    Session content: At the start of this group, patients were invited to check in by identifying themselves, describing their current emotional status, and identifying issues to address in this group.   Area(s) of treatment focus addressed in this session included Symptom Management, Personal Safety and Community Resources/Discharge Planning.  Client reported being safe today.  Reported mood is anxious.    Goal for today is to attend group therapy online and talk with others.The client talked to the group about how she got upset at some kids in the gonzalez at the apartment who were talking about \"boys' penises and that she wanted to call the parents, but she has been blocked from their phone numbers. The group suggested that she write a note to them. She reported that she felt angry about it. She stated that she cleaned, organized, had an apartment inspection, and may go to a Bible Study. She felt uneasy about the inspection because her laundry was out, but the group said that she was ok, and had cleaned.       Therapeutic Interventions/Treatment Strategies:  Psychotherapist reinforced use of skills. Treatment modalities used include Cognitive Behavioral Therapy and Dialectical Behavioral Therapy. Interventions include Coping Skills: Discussed meditation skills and addressed ways to implement meditation skills , Relapse Prevention: Assisted patient in identifying personal vulnerabilities, thoughts, emotions, and situations that may lead to relapse , Mindfulness: Facilitated discussion of when/how to use mindfulness skills to benefit general health, mental health symptoms, and stressors and Symptoms Management: Promoted understanding of their diagnoses and how it impacts their functioning.    Assessment:    Patient response:   Patient responded to session by giving feedback, listening and being attentive    Possible barriers to " participation / learning include: severity of symptoms    Health Issues:   None reported       Substance Use Review:   Substance Use: No active concerns identified.    Mental Status/Behavioral Observations  Appearance:   Appropriate   Eye Contact:   Good   Psychomotor Behavior: Normal   Attitude:   Cooperative   Orientation:   All  Speech   Rate / Production: Normal    Volume:  Normal   Mood:    Anxious  Depressed   Affect:    Constricted   Thought Content:   Rumination and Psychosis reports preservative thoughts  Thought Form:  Coherent  Logical     Insight:    Good     Plan:     Safety Plan: Recommended that patient call 911 or go to the local ED should there be a change in any of these risk factors.     Barriers to treatment: None identified    Patient Contracts (see media tab):  None    Substance Use: Provided encouragement towards sobriety     Continue or Discharge: Patient will continue in Adult Day Treatment (ADT)  as planned. Patient is likely to benefit from learning and using skills as they work toward the goals identified in their treatment plan.      Nely Arthur PsyD  February 28, 2023  Dariusz Spaulding, DARNELL,  Licensed Clinical Psychologist

## 2023-02-28 NOTE — GROUP NOTE
Psychoeducation Group Note    PATIENT'S NAME: Maddy Ortiz  MRN:   [id number]  :   1985  ACCT. NUMBER: [id number]  DATE OF SERVICE: 23  START TIME:  3:00 PM  END TIME:  3:50 PM  FACILITATOR: Demetria Cage LICSW; Johnny Maldonado, OTR/L  TOPIC: MH Life Skills Group: Resiliency Development  Service Modality:  Video Visit     Telemedicine Visit: The patient's condition can be safely assessed and treated via synchronous audio and visual telemedicine encounter.       Reason for Telemedicine Visit: Services only offered telehealth and due to COVID-19.     Originating Site (Patient Location): Patient's home     Distant Site (Provider Location): Provider Remote Setting- Home Office     Consent:  The patient/guardian has verbally consented to: the potential risks and benefits of telemedicine (video visit) versus in person care; bill my insurance or make self-payment for services provided; and responsibility for payment of non-covered services.      Patient would like the video invitation sent by:  My Chart     Mode of Communication:  Video Conference via Medical Zoom     As the provider I attest to compliance with applicable laws and regulations related to telemedicine.    [facility] Adult Mental Health Day Treatment  TRACK: 6B    NUMBER OF PARTICIPANTS: 5    Summary of Group / Topics Discussed:  Resiliency Development:  Coping Skills: Patients were taught how to identify stressors, signs of stress, coping skills, and prevention strategies for overall stress management.  Patients were given the opportunity to identify both ongoing and acute mental health symptoms and how to effectively manage these symptoms by developing an effective aftercare plan.  Patients increased awareness of community based resources.    Patient Session Goals / Objectives:    Identified how using coping skills can be used for symptom and stress management       Improved awareness of individualed symptoms and stressors and  how to effectively cope     Established a relapse prevention plan to practice these skills in their own environments    Practiced and reflected on how to generalize taught skills to their everyday life        Patient Participation / Response:  Fully participated with the group by sharing personal reflections / insights and openly received / provided feedback with other participants.    Verbalized understanding of content    Treatment Plan:  Patient has a current master individualized treatment plan.  See Epic treatment plan for more information.    Demetria Cage, LICSW

## 2023-02-28 NOTE — GROUP NOTE
Psychotherapy Group Note                                    Service Modality:  Video Visit     Telemedicine Visit: The patient's condition can be safely assessed and treated via synchronous audio and visual telemedicine encounter.      Reason for Telemedicine Visit: Patient has requested telehealth visit, Patient unable to travel, Patient convenience (e.g. access to timely appointments / distance to available provider), Patient lives in a designated Health Professional Shortage Area (HPSA), and Services only offered telehealth    Originating Site (Patient Location): Patient's home    Distant Site (Provider Location): [facility]: [facility], [facility]    Consent:  The patient/guardian has verbally consented to: the potential risks and benefits of telemedicine (video visit) versus in person care; bill my insurance or make self-payment for services provided; and responsibility for payment of non-covered services.     Patient would like the video invitation sent by:  My Chart    Mode of Communication:  Video Conference via Medical Zoom    As the provider I attest to compliance with applicable laws and regulations related to telemedicine.        PATIENT'S NAME: Maddy Ortiz  MRN:   [id number]  :   1985  ACCT. NUMBER: [id number]  DATE OF SERVICE: 23  START TIME:  2:00 PM  END TIME:  2:50 PM  FACILITATOR: Nely Arthur PsyD  TOPIC:  EBP Group: Emotions Management  [facility] Adult Mental Health Day Treatment  TRACK: 6B    NUMBER OF PARTICIPANTS: 4    Summary of Group / Topics Discussed:  Emotions Management: Model of Emotions: Patients were introduced to the cyclical model of emotions.  Explored emotions are shaped by different events and one s interpretation of events.  Group discussed how emotions begin with an event, followed by one s interpretation, followed by associated feelings.  Discussion included a review of personal urges and actions that can/do follow an emotional  experience in the patient s life, and the end results.    Patient Session Goals / Objectives:    Demonstrate understanding of types various emotions.    Identify and discuss specific emotions and when they occur; understand triggers.    Identify individual emotions and physical sensations that accompany them.    Discuss urges and actions, and how to influence the intensity of emotional reactions and disrupt the cycle.      Discuss barriers to emotional regulation.    Choose 1-2 strategies to assist with emotional response to potentially distressing situations.      Patient Participation / Response:  Fully participated with the group by sharing personal reflections / insights and openly received / provided feedback with other participants.    Self-aware of experiences with difficult emotions, and strategies to employ to manage them  Maddy talked to the group about managing emotions and that she was writing letters to people to express herself. She talked to the group about going to a family therapy appointment with her daughter later today. She talked about a letter that she wrote to neighbors.  Treatment Plan:  Patient has a current master individualized treatment plan.  See Epic treatment plan for more information.    Kait Duke Psy., D,  Licensed Clinical Psychologist

## 2023-03-06 ENCOUNTER — HOSPITAL ENCOUNTER (OUTPATIENT)
Dept: BEHAVIORAL HEALTH | Facility: CLINIC | Age: 38
Discharge: HOME | End: 2023-03-06
Attending: PSYCHIATRY & NEUROLOGY
Payer: COMMERCIAL

## 2023-03-06 PROCEDURE — 90853 GROUP PSYCHOTHERAPY: CPT | Mod: GT | Performed by: PSYCHOLOGIST

## 2023-03-06 NOTE — GROUP NOTE
Psychoeducation Group Note                                    Service Modality:  Video Visit     Telemedicine Visit: The patient's condition can be safely assessed and treated via synchronous audio and visual telemedicine encounter.      Reason for Telemedicine Visit: Patient has requested telehealth visit, Patient unable to travel, Patient convenience (e.g. access to timely appointments / distance to available provider), Patient lives in a designated Health Professional Shortage Area (HPSA), and Services only offered telehealth    Originating Site (Patient Location): Patient's home    Distant Site (Provider Location): [facility]: [facility], [facility]    Consent:  The patient/guardian has verbally consented to: the potential risks and benefits of telemedicine (video visit) versus in person care; bill my insurance or make self-payment for services provided; and responsibility for payment of non-covered services.     Patient would like the video invitation sent by:  My Chart    Mode of Communication:  Video Conference via Medical Zoom    As the provider I attest to compliance with applicable laws and regulations related to telemedicine.        PATIENT'S NAME: Maddy Ortiz  MRN:   [id number]  :   1985  ACCT. NUMBER: [id number]  DATE OF SERVICE: 3/06/23  START TIME:  2:00 PM  END TIME:  2:50 PM  FACILITATOR: Nely Arthur PsyD; Shirley Cooper RN  TOPIC:  Wellness Group: Mental Health Maintenance  [facility] Adult Mental Health Day Treatment  TRACK: 6B    NUMBER OF PARTICIPANTS: 3    Summary of Group / Topics Discussed:  Mental Health Maintenance:  Assessments of Strengths: Patients completed a self-reflection on personal strengths worksheet. The concept of personal strength as it relates to resilience were explored. Patients shared responses with the group and participated in discussion.     Patient Session Goals / Objectives:  ? Patients identified 1-3 qualities they consider a  personal strength.  ? Patients understood the concept of personal strengths and the connection it has to resiliency        Patient Participation / Response:  Fully participated with the group by sharing personal reflections / insights and openly received / provided feedback with other participants.  Maddy talked to others about her frustration with being a parent. She stated that she did keep a journal but changed the subject to her frustrations about parenting.  Verbalized understanding of mental health maintenance topic    Treatment Plan:  Patient has a current master individualized treatment plan.  See Epic treatment plan for more information.    Kait Duke Psy., DARNELL,  Licensed Clinical Psychologist

## 2023-03-06 NOTE — GROUP NOTE
Process Group Note                                    Service Modality:  Video Visit     Telemedicine Visit: The patient's condition can be safely assessed and treated via synchronous audio and visual telemedicine encounter.      Reason for Telemedicine Visit: Patient has requested telehealth visit, Patient unable to travel, Patient convenience (e.g. access to timely appointments / distance to available provider), Patient lives in a designated Health Professional Shortage Area (HPSA), and Services only offered telehealth    Originating Site (Patient Location): Patient's home    Distant Site (Provider Location): [facility]: [employer], [facility]    Consent:  The patient/guardian has verbally consented to: the potential risks and benefits of telemedicine (video visit) versus in person care; bill my insurance or make self-payment for services provided; and responsibility for payment of non-covered services.     Patient would like the video invitation sent by:  My Chart    Mode of Communication:  Video Conference via Medical Zoom    As the provider I attest to compliance with applicable laws and regulations related to telemedicine.        PATIENT'S NAME: Maddy Ortiz  MRN:   [id number]  :   1985  ACCT. NUMBER: [id number]  DATE OF SERVICE: 3/06/23  START TIME:  1:00 PM  END TIME:  1:50 PM  FACILITATOR: Nely Arthur PsyD  TOPIC:  Process Group    Diagnoses:  295.70  (F25) Schizoaffective Disorder Bipolar Type     300.02 (F41.1) Generalized Anxiety Disorder     Patient reported history of diagnoses: ADHD; an anxiety disorder; a bipolar disorder; depression; an eating disorder; a personality disorder; PTSD; schizophrenia.    Psychiatry:  Shirley at [facility]  therapist  [employer]  [place] Primary [facility] Provider  Shirley Freeman.   formerly [facility] Worker with [facility]   with [facility]   hospitals worker twice per week,      [facility] Adult  Mental Health Day Treatment  TRACK: 6B    NUMBER OF PARTICIPANTS: 4          Data:    Session content: At the start of this group, patients were invited to check in by identifying themselves, describing their current emotional status, and identifying issues to address in this group.   Area(s) of treatment focus addressed in this session included Symptom Management, Personal Safety and Community Resources/Discharge Planning.  Client reported being safe today.  Reported mood is frustrated.   Goal for today is to attend group therapy online and talk with others. The client talked to the group about how she is having problems with her daughter and parenting her. She reported problems with communication. She stated that she felt upset and the school, [employer], and people on Face book have been disparaging to her daughter. She stated that she problems with discipline. She reported that she felt she didn't have enough time for doing other things in the apartment.       Therapeutic Interventions/Treatment Strategies:  Psychotherapist reinforced use of skills. Treatment modalities used include Cognitive Behavioral Therapy and Dialectical Behavioral Therapy. Interventions include Coping Skills: Assisted patient in understanding the purpose of planning / creating / participating / sharing in positive experiences, Relapse Prevention: Facilitated understanding of effective coping skills in response to triggers for substance use, Mindfulness: Facilitated discussion of when/how to use mindfulness skills to benefit general health, mental health symptoms, and stressors and Symptoms Management: Promoted understanding of their diagnoses and how it impacts their functioning.    Assessment:    Patient response:   Patient responded to session by giving feedback, listening and focusing on goals    Possible barriers to participation / learning include: severity of symptoms    Health Issues:   None reported       Substance Use  Review:   Substance Use: No active concerns identified.    Mental Status/Behavioral Observations  Appearance:   Appropriate   Eye Contact:   Good   Psychomotor Behavior: Normal   Attitude:   Cooperative   Orientation:   All  Speech   Rate / Production: Normal    Volume:  Normal   Mood:    Anxious  Sad   Affect:    Constricted   Thought Content:   Rumination and Psychosis reports preservative thoughts  Thought Form:  Coherent  Logical  Tangential     Insight:    Good     Plan:     Safety Plan: Recommended that patient call 911 or go to the local ED should there be a change in any of these risk factors.     Barriers to treatment: None identified    Patient Contracts (see media tab):  None    Substance Use: Provided encouragement towards sobriety     Continue or Discharge: Patient will continue in Adult Day Treatment (ADT)  as planned. Patient is likely to benefit from learning and using skills as they work toward the goals identified in their treatment plan.      Nely Arthur PsyD  March 6, 2023  Jacek Spaulding., D,  Licensed Clinical Psychologist

## 2023-03-07 ENCOUNTER — HOSPITAL ENCOUNTER (OUTPATIENT)
Dept: BEHAVIORAL HEALTH | Facility: CLINIC | Age: 38
Discharge: HOME | End: 2023-03-07
Attending: PSYCHIATRY & NEUROLOGY
Payer: COMMERCIAL

## 2023-03-07 PROCEDURE — 90853 GROUP PSYCHOTHERAPY: CPT | Mod: GT | Performed by: PSYCHOLOGIST

## 2023-03-07 NOTE — GROUP NOTE
Psychotherapy Group Note                                    Service Modality:  Video Visit     Telemedicine Visit: The patient's condition can be safely assessed and treated via synchronous audio and visual telemedicine encounter.      Reason for Telemedicine Visit: Patient has requested telehealth visit, Patient unable to travel, Patient convenience (e.g. access to timely appointments / distance to available provider), Patient lives in a designated Health Professional Shortage Area (HPSA), and Services only offered telehealth    Originating Site (Patient Location): Patient's home    Distant Site (Provider Location): [facility]: [facility], [facility]    Consent:  The patient/guardian has verbally consented to: the potential risks and benefits of telemedicine (video visit) versus in person care; bill my insurance or make self-payment for services provided; and responsibility for payment of non-covered services.     Patient would like the video invitation sent by:  My Chart    Mode of Communication:  Video Conference via Medical Zoom    As the provider I attest to compliance with applicable laws and regulations related to telemedicine.        PATIENT'S NAME: Maddy Ortiz  MRN:   [id number]  :   1985  ACCT. NUMBER: [id number]  DATE OF SERVICE: 3/07/23  START TIME:  2:00 PM  END TIME:  2:50 PM  FACILITATOR: Nely Arthur PsyD  TOPIC:  EBP Group: Emotions Management  [facility] Adult Mental Health Day Treatment  TRACK: 6B    NUMBER OF PARTICIPANTS: 5    Summary of Group / Topics Discussed:  Emotions Management: Understanding Emotions: Patients discussed the purpose of emotions and function they serve in our lives.  Reviewed core emotions, why they happen (triggers), and how they occur. The group assisted one anothers' understanding that: emotional experiences are important; difficult emotions have a place in our lives; and the differences between various emotions.    Patient Session  Goals / Objectives:    Demonstrate understanding of types various emotions    Identify and discuss specific emotions and when they occur; understand triggers    Discuss barriers to emotional regulation      Patient Participation / Response:  Fully participated with the group by sharing personal reflections / insights and openly received / provided feedback with other participants.  The client discussed parts of IMPROVE with the group and gave examples of how they were used in times of stress.  Maddy discussed how writing and decorating was helpful to calm herself when distressed.   Demonstrated knowledge of when to consider applying a variety of emotions management skills in daily life    Treatment Plan:  Patient has a current master individualized treatment plan.  See Epic treatment plan for more information.    Kait Duke Psy., DARNELL,  Licensed Clinical Psychologist

## 2023-03-07 NOTE — GROUP NOTE
Process Group Note                                    Service Modality:  Video Visit     Telemedicine Visit: The patient's condition can be safely assessed and treated via synchronous audio and visual telemedicine encounter.      Reason for Telemedicine Visit: Patient has requested telehealth visit, Patient unable to travel, Patient convenience (e.g. access to timely appointments / distance to available provider), Patient lives in a designated Health Professional Shortage Area (HPSA), and Services only offered telehealth    Originating Site (Patient Location): Patient's home    Distant Site (Provider Location): [facility]: [facility], [facility]    Consent:  The patient/guardian has verbally consented to: the potential risks and benefits of telemedicine (video visit) versus in person care; bill my insurance or make self-payment for services provided; and responsibility for payment of non-covered services.     Patient would like the video invitation sent by:  My Chart    Mode of Communication:  Video Conference via Medical Zoom    As the provider I attest to compliance with applicable laws and regulations related to telemedicine.        PATIENT'S NAME: Maddy Ortiz  MRN:   [id number]  :   1985  ACCT. NUMBER: [id number]  DATE OF SERVICE: 3/07/23  START TIME:  1:00 PM  END TIME:  1:50 PM  FACILITATOR: Nely Arthur PsyD  TOPIC:  Process Group    Diagnoses:  295.70  (F25) Schizoaffective Disorder Bipolar Type     300.02 (F41.1) Generalized Anxiety Disorder     Patient reported history of diagnoses: ADHD; an anxiety disorder; a bipolar disorder; depression; an eating disorder; a personality disorder; PTSD; schizophrenia.    Psychiatry:  Shirley at [facility]  therapist  [facility] [facility]  [place] Primary Care Provider  Shirley Freeman.   Formerly [facility] Worker with [facility]   with [facility]   [facility] worker twice per week,      [facility] Adult  Mental Health Day Treatment  TRACK: 6B    NUMBER OF PARTICIPANTS: 5          Data:    Session content: At the start of this group, patients were invited to check in by identifying themselves, describing their current emotional status, and identifying issues to address in this group.   Area(s) of treatment focus addressed in this session included Symptom Management, Personal Safety and Community Resources/Discharge Planning.  Client reported being safe today.  Reported mood is anxious.    Goal for today is to attend group therapy and talk with others about her concerns about parenting.  The client talked to the group about how she felt that she was being punished and that she felt others were disparaging to her and they should be punished. She reported that  She was concerned about her daughter and getting an IEP for her. She stated that she felt she was being tested for her tolerance by many people and didn't want to talk with others or trust them lately. She talked to the group about many issues of parenting and the group agreed it would be good to bring the concerns to family therapy.       Therapeutic Interventions/Treatment Strategies:  Psychotherapist reinforced use of skills. Treatment modalities used include Cognitive Behavioral Therapy and Dialectical Behavioral Therapy. Interventions include Coping Skills: Promoted understanding of how and when to apply grounding strategies to reduce distress and increase presence in the moment, Relapse Prevention: Assisted patient in identifying the challenges and barriers to participation and attendance to support groups/community resources, Mindfulness: Facilitated discussion of when/how to use mindfulness skills to benefit general health, mental health symptoms, and stressors and Symptoms Management: Promoted understanding of their diagnoses and how it impacts their functioning.    Assessment:    Patient response:   Patient responded to session by listening, focusing on  goals and appearing alert    Possible barriers to participation / learning include: severity of symptoms    Health Issues:   None reported       Substance Use Review:   Substance Use: No active concerns identified.    Mental Status/Behavioral Observations  Appearance:   Appropriate   Eye Contact:   Good   Psychomotor Behavior: Normal   Attitude:   Cooperative   Orientation:   All  Speech   Rate / Production: Normal    Volume:  Normal   Mood:    Anxious  Depressed  Sad   Affect:    Constricted   Thought Content:   Rumination and Psychosis reports delusions and preservative thoughts  Thought Form:  Coherent  Logical  Psychosis    Insight:    Good  and Fair     Plan:     Safety Plan: Recommended that patient call 911 or go to the local ED should there be a change in any of these risk factors.     Barriers to treatment: None identified    Patient Contracts (see media tab):  None    Substance Use: Provided encouragement towards sobriety     Continue or Discharge: Patient will continue in Adult Day Treatment (ADT)  as planned. Patient is likely to benefit from learning and using skills as they work toward the goals identified in their treatment plan.      Nely Arthur PsyD  March 7, 2023  Dariusz Spaulding D,  Licensed Clinical Psychologist

## 2023-03-09 ENCOUNTER — HOSPITAL ENCOUNTER (OUTPATIENT)
Dept: BEHAVIORAL HEALTH | Facility: CLINIC | Age: 38
Discharge: HOME | End: 2023-03-09
Attending: PSYCHIATRY & NEUROLOGY
Payer: COMMERCIAL

## 2023-03-09 PROCEDURE — 90853 GROUP PSYCHOTHERAPY: CPT | Mod: GT | Performed by: PSYCHOLOGIST

## 2023-03-09 NOTE — GROUP NOTE
Psychoeducation Group Note                                    Service Modality:  Video Visit     Telemedicine Visit: The patient's condition can be safely assessed and treated via synchronous audio and visual telemedicine encounter.      Reason for Telemedicine Visit: Patient has requested telehealth visit, Patient unable to travel, Patient convenience (e.g. access to timely appointments / distance to available provider), Patient lives in a designated Health Professional Shortage Area (HPSA), and Services only offered telehealth    Originating Site (Patient Location): Patient's home    Distant Site (Provider Location): [facility]: [facility], [facility]    Consent:  The patient/guardian has verbally consented to: the potential risks and benefits of telemedicine (video visit) versus in person care; bill my insurance or make self-payment for services provided; and responsibility for payment of non-covered services.     Patient would like the video invitation sent by:  My Chart    Mode of Communication:  Video Conference via Medical Zoom    As the provider I attest to compliance with applicable laws and regulations related to telemedicine.        PATIENT'S NAME: Maddy Ortiz  MRN:   [id number]  :   1985  ACCT. NUMBER: [id number]  DATE OF SERVICE: 3/09/23  START TIME:  2:00 PM  END TIME:  2:50 PM  FACILITATOR: Nely Arthur PsyD; Shirley Cooper RN  TOPIC:  Wellness Group: [facility] Adult Mental Health Day Treatment  TRACK: 6B    NUMBER OF PARTICIPANTS: 3    Summary of Group / Topics Discussed:  Foundations of Health: Nutrition: Macronutrients: Patient were provided education on major macronutrients, their role in the body, and why it is important to meet daily nutritional needs. Obstacles to meeting daily nutritional needs were identified in group discussion and strategies to promote improved nutrition were explored. Macronutrients discussed include:  carbohydrates, proteins and amino acids, fats, fiber, and water.     Patient Session Goals / Objectives:  ? Discussed the role of diet on mood, physical health, energy level, and the development of chronic disease.  ? Identified daily nutritional needs recommended by the USDA via My Plate education resources  ? Developing increased health literacy skills in finding credible nutrition information from reliable sources      Patient Participation / Response:  Fully participated with the group by sharing personal reflections / insights and openly received / provided feedback with other participants.  Maddy talked about cooking healthy meals and was concerned about her nutrition and her daughter's. She reported that she had delusional thoughts around eating disorders and is trying to stay healthy.  Demonstrated understanding of topics discussed through group discussion and participation  The group discussed different types of foods and nutrition related to diabetes and blood sugars.  Treatment Plan:  Patient has a current master individualized treatment plan.  See Epic treatment plan for more information.    Kait Duke Psy., D,  Licensed Clinical Psychologist

## 2023-03-09 NOTE — GROUP NOTE
Process Group Note                                    Service Modality:  Video Visit     Telemedicine Visit: The patient's condition can be safely assessed and treated via synchronous audio and visual telemedicine encounter.      Reason for Telemedicine Visit: Patient has requested telehealth visit, Patient unable to travel, Patient convenience (e.g. access to timely appointments / distance to available provider), Patient lives in a designated Health Professional Shortage Area (HPSA), and Services only offered telehealth    Originating Site (Patient Location): Patient's home    Distant Site (Provider Location): [facility]: [facility], [facility]    Consent:  The patient/guardian has verbally consented to: the potential risks and benefits of telemedicine (video visit) versus in person care; bill my insurance or make self-payment for services provided; and responsibility for payment of non-covered services.     Patient would like the video invitation sent by:  My Chart    Mode of Communication:  Video Conference via Medical Zoom    As the provider I attest to compliance with applicable laws and regulations related to telemedicine.        PATIENT'S NAME: Maddy Ortiz  MRN:   [id number]  :   1985  ACCT. NUMBER: [id number]  DATE OF SERVICE: 3/09/23  START TIME:  1:00 PM  END TIME:  1:50 PM  FACILITATOR: Nely Arthur PsyD  TOPIC:  Process Group    Diagnoses:  295.70  (F25) Schizoaffective Disorder Bipolar Type     300.02 (F41.1) Generalized Anxiety Disorder     Patient reported history of diagnoses: ADHD; an anxiety disorder; a bipolar disorder; depression; an eating disorder; a personality disorder; PTSD; schizophrenia.    Psychiatry:  Shirley at [facility]  therapist  [facility]  [place] Primary Care Provider  Shirley Freeman.   [facility] Worker with [facility]   with [facility]   \[facility] [place] [facility]\"" worker twice per week,      [facility] Adult  "Mental Health Day Treatment  TRACK: 6B    NUMBER OF PARTICIPANTS: 4          Data:    Session content: At the start of this group, patients were invited to check in by identifying themselves, describing their current emotional status, and identifying issues to address in this group.   Area(s) of treatment focus addressed in this session included Symptom Management, Personal Safety and Community Resources/Discharge Planning.  Client reported being safe today.  Reported mood is anxious.    Goal for today is to attend group therapy online and talk with others.  The client talked to the group about how she noticed that there were \"demons and devils\" in her mind and that she made \"grievous faults in her life.\" She reported that he feels distressed from the demons and devils, and some external anger.  She talked about being \"maternalistic\" and that it wasn't something that others did usually. She reported that she will talk with her family therapist about her partner and wants to improve their relationship and get \"more purpose fo it.\"  She reported that she will talk with her partner about their relationship about family therapy. She reported that she noticed paranoia and delusional thoughts around an eating disorder, and being fearful that she may frighten others.     Therapeutic Interventions/Treatment Strategies:  Psychotherapist reinforced use of skills. Treatment modalities used include Cognitive Behavioral Therapy and Dialectical Behavioral Therapy. Interventions include Coping Skills: Discussed meditation skills and addressed ways to implement meditation skills , Relapse Prevention: Assisted patient in identifying the challenges and barriers to participation and attendance to support groups/community resources, Mindfulness: Facilitated discussion of when/how to use mindfulness skills to benefit general health, mental health symptoms, and stressors and Symptoms Management: Promoted understanding of their diagnoses " and how it impacts their functioning.    Assessment:    Patient response:   Patient responded to session by accepting feedback, giving feedback and being attentive    Possible barriers to participation / learning include: severity of symptoms    Health Issues:   None reported       Substance Use Review:   Substance Use: No active concerns identified.    Mental Status/Behavioral Observations  Appearance:   Appropriate   Eye Contact:   Good   Psychomotor Behavior: Normal   Attitude:   Cooperative   Orientation:   All  Speech   Rate / Production: Normal    Volume:  Normal   Mood:    Anxious  Depressed  Sad   Affect:    Constricted   Thought Content:   Rumination and Psychosis reports paranoia and delusions  Thought Form:  Coherent  Logical  Psychosis    Insight:    Good     Plan:     Safety Plan: Recommended that patient call 911 or go to the local ED should there be a change in any of these risk factors.     Barriers to treatment: None identified    Patient Contracts (see media tab):  None    Substance Use: Provided encouragement towards sobriety     Continue or Discharge: Patient will continue in Adult Day Treatment (ADT)  as planned. Patient is likely to benefit from learning and using skills as they work toward the goals identified in their treatment plan.      Nely Arthur PsyD  March 9, 2023  Dariusz Spaulding D,  Licensed Clinical Psychologist

## 2023-03-10 ENCOUNTER — MYC MEDICAL ADVICE (OUTPATIENT)
Dept: FAMILY MEDICINE | Facility: CLINIC | Age: 38
End: 2023-03-10
Payer: COMMERCIAL

## 2023-03-11 ENCOUNTER — MYC MEDICAL ADVICE (OUTPATIENT)
Dept: FAMILY MEDICINE | Facility: CLINIC | Age: 38
End: 2023-03-11
Payer: COMMERCIAL

## 2023-03-13 NOTE — TELEPHONE ENCOUNTER
Forwarding MyChart to PCP for review and recommendations.   MyChart sent advising patient PCP is out today and may need a visit to discuss her concerns.     Chiquis Roe RN  [facility]

## 2023-03-14 ENCOUNTER — HOSPITAL ENCOUNTER (OUTPATIENT)
Dept: BEHAVIORAL HEALTH | Facility: CLINIC | Age: 38
Discharge: HOME | End: 2023-03-14
Attending: PSYCHIATRY & NEUROLOGY
Payer: COMMERCIAL

## 2023-03-14 PROCEDURE — 90853 GROUP PSYCHOTHERAPY: CPT | Mod: GT | Performed by: PSYCHOLOGIST

## 2023-03-14 NOTE — GROUP NOTE
Process Group Note                                    Service Modality:  Video Visit     Telemedicine Visit: The patient's condition can be safely assessed and treated via synchronous audio and visual telemedicine encounter.      Reason for Telemedicine Visit: Patient has requested telehealth visit, Patient unable to travel, Patient convenience (e.g. access to timely appointments / distance to available provider), Patient lives in a designated Health Professional Shortage Area (HPSA), and Services only offered telehealth    Originating Site (Patient Location): Patient's home    Distant Site (Provider Location): [facility]: [facility], [facility]    Consent:  The patient/guardian has verbally consented to: the potential risks and benefits of telemedicine (video visit) versus in person care; bill my insurance or make self-payment for services provided; and responsibility for payment of non-covered services.     Patient would like the video invitation sent by:  My Chart    Mode of Communication:  Video Conference via Medical Zoom    As the provider I attest to compliance with applicable laws and regulations related to telemedicine.        PATIENT'S NAME: Maddy Ortiz  MRN:   [id number]  :   1985  ACCT. NUMBER: [id number]  DATE OF SERVICE: 3/14/23  START TIME:  1:00 PM  END TIME:  1:50 PM  FACILITATOR: Nely Arthur PsyD  TOPIC:  Process Group    Diagnoses:  295.70  (F25) Schizoaffective Disorder Bipolar Type     300.02 (F41.1) Generalized Anxiety Disorder     Patient reported history of diagnoses: ADHD; an anxiety disorder; a bipolar disorder; depression; an eating disorder; a personality disorder; PTSD; schizophrenia.    Psychiatry:  Shirley at [facility]  therapist  [facility]  [place] Primary Care Provider  Shirley Freeman.   [facility] Worker with [facility]   with [facility]   [facility] worker twice per week,      [facility] Adult  Mental Health Day Treatment  TRACK: 6B    NUMBER OF PARTICIPANTS: 4          Data:    Session content: At the start of this group, patients were invited to check in by identifying themselves, describing their current emotional status, and identifying issues to address in this group.   Area(s) of treatment focus addressed in this session included Symptom Management, Personal Safety and Community Resources/Discharge Planning.  Client reported being safe today.  Reported mood is ok.    Goal for today is to talk to the group and share with others.The client talked to the group about how she didn't feel well on Friday of last week and went inpatient at the [facility]. She reported that she got out of the hospital yesterday and that they increased her Risperdal by 1 mg at night. She reported that she slept better with it. She stated that she started a blog and a new website that shows her art and has her stories on it. She shared it with others. She reported that she had some psychosis over the weekend and talked to the group about making goals and plans for distress.     Therapeutic Interventions/Treatment Strategies:  Psychotherapist reinforced use of skills. Treatment modalities used include Cognitive Behavioral Therapy and Dialectical Behavioral Therapy. Interventions include Coping Skills: Assisted patient in understanding the purpose of planning / creating / participating / sharing in positive experiences, Relapse Prevention: Coached on skills to manage factors that contribute to relapse, Mindfulness: Facilitated discussion of when/how to use mindfulness skills to benefit general health, mental health symptoms, and stressors and Symptoms Management: Promoted understanding of their diagnoses and how it impacts their functioning.    Assessment:    Patient response:   Patient responded to session by giving feedback, listening and focusing on goals    Possible barriers to participation / learning include: severity of  symptoms    Health Issues:   None reported       Substance Use Review:   Substance Use: No active concerns identified.    Mental Status/Behavioral Observations  Appearance:   Appropriate   Eye Contact:   Good   Psychomotor Behavior: Normal   Attitude:   Cooperative   Orientation:   All  Speech   Rate / Production: Normal    Volume:  Normal   Mood:    Anxious  Depressed   Affect:    Constricted   Thought Content:   Rumination and Psychosis reports paranoia  Thought Form:  Coherent  Logical  Psychosis    Insight:    Good     Plan:     Safety Plan: Recommended that patient call 911 or go to the local ED should there be a change in any of these risk factors.     Barriers to treatment: None identified    Patient Contracts (see media tab):  None    Substance Use: Provided encouragement towards sobriety     Continue or Discharge: Patient will continue in Adult Day Treatment (ADT)  as planned. Patient is likely to benefit from learning and using skills as they work toward the goals identified in their treatment plan.      Nely Arthur PsyD  March 14, 2023  Dariusz Spaulding D,  Licensed Clinical Psychologist

## 2023-03-14 NOTE — GROUP NOTE
Psychotherapy Group Note                                    Service Modality:  Video Visit     Telemedicine Visit: The patient's condition can be safely assessed and treated via synchronous audio and visual telemedicine encounter.      Reason for Telemedicine Visit: Patient has requested telehealth visit, Patient unable to travel, Patient convenience (e.g. access to timely appointments / distance to available provider), Patient lives in a designated Health Professional Shortage Area (HPSA), and Services only offered telehealth    Originating Site (Patient Location): Patient's home    Distant Site (Provider Location): [facility]: [facility], [facility]    Consent:  The patient/guardian has verbally consented to: the potential risks and benefits of telemedicine (video visit) versus in person care; bill my insurance or make self-payment for services provided; and responsibility for payment of non-covered services.     Patient would like the video invitation sent by:  My Chart    Mode of Communication:  Video Conference via Medical Zoom    As the provider I attest to compliance with applicable laws and regulations related to telemedicine.        PATIENT'S NAME: Maddy Ortiz  MRN:   [id number]  :   1985  ACCT. NUMBER: [id number]  DATE OF SERVICE: 3/14/23  START TIME:  2:00 PM  END TIME:  2:50 PM  FACILITATOR: Nely Arthur PsyD  TOPIC:  EBP Group: Coping Skills  [facility] Adult Mental Health Day Treatment  TRACK: 6B    NUMBER OF PARTICIPANTS: 4    Summary of Group / Topics Discussed:  Coping Skills: Grounding: Patients discussed and practiced strategies to increase attachment / presence to the current moment.  Patients identified situations in which using these strategies will help improve emotion regulation sense of calm in the body.  Reviewed the benefits of applying grounding strategies, as well as past / current practices of each member.  Patients identified situations in which  using these strategies would reduce stress. They developed the ability to distinguish when these strategies can be useful in their lives for management and stress and psychological well-being.    Patient Session Goals / Objectives:    Understand the purpose of using grounding strategies to reduce stress.    Verbalize understanding of how and when to apply grounding strategies to reduce distress and increase presence in the moment.    Review patients current grounding practices and discuss a more formal way of practicing and accessing skills.    Practice using various calming strategies (e.g. 5-4-3-2-1; mental and body awareness).    Choose 1-2 grounding strategies to apply during times of distress.        Patient Participation / Response:  Fully participated with the group by sharing personal reflections / insights and openly received / provided feedback with other participants.    Demonstrated knowledge of when to consider using a variety of coping skills in daily life  The client participated in a grounding skill activity and talked about different ways to alert themself with body movements. The client participated in a mental activity skills of naming different items in the environment that had different textures. The client discussed paced breathing and its use with anxiety.   Treatment Plan:  Patient has a current master individualized treatment plan.  See Epic treatment plan for more information.    Kait Duke Psy., D,  Licensed Clinical Psychologist

## 2023-03-14 NOTE — TELEPHONE ENCOUNTER
Documents reviewed.    Patient should continue to participate in her day treatment program and maintain close follow up with her mental health team.    Shirley Freeman, CNP

## 2023-03-16 ENCOUNTER — TRANSFERRED RECORDS (OUTPATIENT)
Dept: HEALTH INFORMATION MANAGEMENT | Facility: CLINIC | Age: 38
End: 2023-03-16
Payer: COMMERCIAL

## 2023-03-16 ENCOUNTER — HOSPITAL ENCOUNTER (OUTPATIENT)
Dept: BEHAVIORAL HEALTH | Facility: CLINIC | Age: 38
Discharge: HOME | End: 2023-03-16
Attending: PSYCHIATRY & NEUROLOGY
Payer: COMMERCIAL

## 2023-03-16 PROCEDURE — 90853 GROUP PSYCHOTHERAPY: CPT | Mod: GT | Performed by: SOCIAL WORKER

## 2023-03-16 PROCEDURE — 90853 GROUP PSYCHOTHERAPY: CPT | Mod: GT | Performed by: PSYCHOLOGIST

## 2023-03-16 NOTE — GROUP NOTE
Psychoeducation Group Note    Service Modality:  Video Visit     Telemedicine Visit: The patient's condition can be safely assessed and treated via synchronous audio and visual telemedicine encounter.      Reason for Telemedicine Visit: Patient has requested telehealth visit, Patient unable to travel, Patient convenience (e.g. access to timely appointments / distance to available provider), Patient lives in a designated Health Professional Shortage Area (HPSA), and Services only offered telehealth    Originating Site (Patient Location): Patient's home    Distant Site (Provider Location): remote site at therapist's home    Consent:  The patient/guardian has verbally consented to: the potential risks and benefits of telemedicine (video visit) versus in person care; bill my insurance or make self-payment for services provided; and responsibility for payment of non-covered services.     Patient would like the video invitation sent by:  My Chart    Mode of Communication:  Video Conference via Medical Zoom    As the provider I attest to compliance with applicable laws and regulations related to telemedicine.     PATIENT'S NAME: Maddy Ortiz  MRN:   [id number]  :   1985  ACCT. NUMBER: [id number]  DATE OF SERVICE: 3/16/23  START TIME:  2:00 PM  END TIME:  2:50 PM  FACILITATOR: Nely Arthur PsyD; Shirley Cooper RN  TOPIC:  Wellness Group: [facility] Adult Mental Health Day Treatment  TRACK: 6B    NUMBER OF PARTICIPANTS: 4    Summary of Group / Topics Discussed:  Foundations of Health: Nutrition: Design a Meal: Patients were educated on the USDA guidelines for creating meals that meet daily nutritional requirements. With the assistance of the instructor, patients were guided to use these to design well-balanced meals. Barriers and obstacles to meeting daily nutritional needs were discussed in the group and solutions and strategies were explored. Patients were also provided education  and resources on healthy snack options, budget saving tips, and safe food handling & preparation.      Patient Session Goals / Objectives:  ? Applied USDA MyPlate guidelines to design a balanced breakfast, lunch, and dinner  ? Verbalized healthy snack options   ? Identified personal barriers/obstacles to meeting nutritional needs (if present) and listed strategies that could be used to overcome them      Patient Participation / Response:  Fully participated with the group by sharing personal reflections / insights and openly received / provided feedback with other participants.    Demonstrated understanding of topics discussed through group discussion and participation  The client participated in a discussion about nutrition and how to get prpoer foods in a diet with a balanced meal. The client discussed budgets, canned and frozen foods, cooking, and planing meals.   Treatment Plan:  Patient has a current master individualized treatment plan.  See Epic treatment plan for more information.    Kait Duke Psy., D,  Licensed Clinical Psychologist

## 2023-03-16 NOTE — GROUP NOTE
Process Group Note    Service Modality:  Video Visit     Telemedicine Visit: The patient's condition can be safely assessed and treated via synchronous audio and visual telemedicine encounter.      Reason for Telemedicine Visit: Patient has requested telehealth visit, Patient unable to travel, Patient convenience (e.g. access to timely appointments / distance to available provider), Patient lives in a designated Health Professional Shortage Area (HPSA), and Services only offered telehealth    Originating Site (Patient Location): Patient's home    Distant Site (Provider Location): remote site at therapist's home    Consent:  The patient/guardian has verbally consented to: the potential risks and benefits of telemedicine (video visit) versus in person care; bill my insurance or make self-payment for services provided; and responsibility for payment of non-covered services.     Patient would like the video invitation sent by:  My Chart    Mode of Communication:  Video Conference via Medical Zoom    As the provider I attest to compliance with applicable laws and regulations related to telemedicine.     PATIENT'S NAME: Maddy Ortiz  MRN:   [id number]  :   1985  ACCT. NUMBER: [id number]  DATE OF SERVICE: 3/16/23  START TIME:  1:00 PM  END TIME:  1:50 PM  FACILITATOR: Nely Arthur PsyD  TOPIC:  Process Group    Diagnoses:  295.70  (F25) Schizoaffective Disorder Bipolar Type     300.02 (F41.1) Generalized Anxiety Disorder     Patient reported history of diagnoses: ADHD; an anxiety disorder; a bipolar disorder; depression; an eating disorder; a personality disorder; PTSD; schizophrenia.    Psychiatry:  Shirley at [facility]  therapist  [facility]  [place] [facility] Provider  Shirley Freeman.   [facility] Worker with [facility]   with [facility]   [facility] worker twice per week,      [facility] Adult Mental Health Day Treatment  TRACK: 6B    NUMBER OF  "PARTICIPANTS: 6          Data:    Session content: At the start of this group, patients were invited to check in by identifying themselves, describing their current emotional status, and identifying issues to address in this group.   Area(s) of treatment focus addressed in this session included Symptom Management, Personal Safety and Community Resources/Discharge Planning.  Client reported being safe today.  Reported mood is \"better.\"   Goal for today is to attend group therapy and talk online with others.  The client talked to the group about how she didn't trust others, was writing excessively and was worried. She reported that she went inpatient at [facility] and they raised her medication. She reported that she will try to not focus on writing about negative thoughts, but to write prayers for herself. She reported that she places them on her blog. She talked about feeling more peace, happiness, and clifford from her personal prayers.      Therapeutic Interventions/Treatment Strategies:  Psychotherapist reinforced use of skills. Treatment modalities used include Cognitive Behavioral Therapy and Dialectical Behavioral Therapy. Interventions include Coping Skills: Facilitated understanding of  what factors may contribute to symptom relapse and skills plan to manage symptom relapse , Relapse Prevention: Coached on skills to manage factors that contribute to relapse, Mindfulness: Facilitated discussion of when/how to use mindfulness skills to benefit general health, mental health symptoms, and stressors and Symptoms Management: Promoted understanding of their diagnoses and how it impacts their functioning.    Assessment:    Patient response:   Patient responded to session by giving feedback, listening and focusing on goals    Possible barriers to participation / learning include: severity of symptoms    Health Issues:   None reported       Substance Use Review:   Substance Use: No active concerns identified.    Mental " Status/Behavioral Observations  Appearance:   Appropriate   Eye Contact:   Good   Psychomotor Behavior: Normal   Attitude:   Cooperative   Orientation:   All  Speech   Rate / Production: Normal    Volume:  Normal   Mood:    Anxious  Depressed   Affect:    Constricted   Thought Content:   Rumination and Psychosis reports paranoia  Thought Form:  Coherent  Logical  Psychosis    Insight:    Good     Plan:     Safety Plan: Recommended that patient call 911 or go to the local ED should there be a change in any of these risk factors.     Barriers to treatment: None identified    Patient Contracts (see media tab):  None    Substance Use: Provided encouragement towards sobriety     Continue or Discharge: Patient will continue in Adult Day Treatment (ADT)  as planned. Patient is likely to benefit from learning and using skills as they work toward the goals identified in their treatment plan.      Nely Arthur PsyD  March 16, 2023  Dariusz Spaulding, DARNELL,  Licensed Clinical Psychologist

## 2023-03-16 NOTE — GROUP NOTE
Psychoeducation Group Note    PATIENT'S NAME: Maddy Ortiz  MRN:   [id number]  :   1985  ACCT. NUMBER: [id number]  DATE OF SERVICE: 3/16/23  START TIME:  3:00 PM  END TIME:  3:50 PM  FACILITATOR: Demetria Cage LICSW; Johnny Maldonado, OTR/L  TOPIC: MH Life Skills Group: Resiliency Development  Service Modality:  Video Visit     Telemedicine Visit: The patient's condition can be safely assessed and treated via synchronous audio and visual telemedicine encounter.       Reason for Telemedicine Visit: Services only offered telehealth and due to COVID-19.     Originating Site (Patient Location): Patient's home     Distant Site (Provider Location): Provider Remote Setting- Home Office     Consent:  The patient/guardian has verbally consented to: the potential risks and benefits of telemedicine (video visit) versus in person care; bill my insurance or make self-payment for services provided; and responsibility for payment of non-covered services.      Patient would like the video invitation sent by:  My Chart     Mode of Communication:  Video Conference via Medical Zoom     As the provider I attest to compliance with applicable laws and regulations related to telemedicine.    [facility] Adult Mental Health Day Treatment  TRACK: 6B    NUMBER OF PARTICIPANTS: 3    Summary of Group / Topics Discussed:  Resiliency Development:  Coping Skills (Mental Health Check In): Patients were taught how to identify stressors, signs of stress, coping skills, and prevention strategies for overall stress management.  Patients were given the opportunity to identify both ongoing and acute mental health symptoms and how to effectively manage these symptoms by developing an effective aftercare plan.  Patients increased awareness of community based resources.    Patient Session Goals / Objectives:    Identified how using coping skills can be used for symptom and stress management       Improved awareness of individualed  symptoms and stressors and how to effectively cope     Established a relapse prevention plan to practice these skills in their own environments    Practiced and reflected on how to generalize taught skills to their everyday life        Patient Participation / Response:  Fully participated with the group by sharing personal reflections / insights and openly received / provided feedback with other participants.    Verbalized understanding of content    Treatment Plan:  Patient has a current master individualized treatment plan.  See Epic treatment plan for more information.    Demetria Cage, NELLIESW

## 2023-03-20 ENCOUNTER — HOSPITAL ENCOUNTER (OUTPATIENT)
Dept: BEHAVIORAL HEALTH | Facility: CLINIC | Age: 38
Discharge: HOME | End: 2023-03-20
Attending: PSYCHIATRY & NEUROLOGY
Payer: COMMERCIAL

## 2023-03-20 PROCEDURE — 90853 GROUP PSYCHOTHERAPY: CPT | Mod: GT | Performed by: PSYCHOLOGIST

## 2023-03-20 PROCEDURE — 90853 GROUP PSYCHOTHERAPY: CPT | Mod: GT | Performed by: SOCIAL WORKER

## 2023-03-20 NOTE — GROUP NOTE
Psychoeducation Group Note                                    Service Modality:  Video Visit     Telemedicine Visit: The patient's condition can be safely assessed and treated via synchronous audio and visual telemedicine encounter.      Reason for Telemedicine Visit: Patient has requested telehealth visit, Patient unable to travel, Patient convenience (e.g. access to timely appointments / distance to available provider), Patient lives in a designated Health Professional Shortage Area (HPSA), and Services only offered telehealth    Originating Site (Patient Location): Patient's home    Distant Site (Provider Location): [facility]: [facility], [facility]    Consent:  The patient/guardian has verbally consented to: the potential risks and benefits of telemedicine (video visit) versus in person care; bill my insurance or make self-payment for services provided; and responsibility for payment of non-covered services.     Patient would like the video invitation sent by:  My Chart    Mode of Communication:  Video Conference via Medical Zoom    As the provider I attest to compliance with applicable laws and regulations related to telemedicine.        PATIENT'S NAME: Maddy Ortiz  MRN:   [id number]  :   1985  ACCT. NUMBER: [id number]  DATE OF SERVICE: 3/20/23  START TIME:  2:00 PM  END TIME:  2:50 PM  FACILITATOR: Nley Arthur PsyD; Shirley Cooper RN  TOPIC:  Wellness Group: Health Maintenance  [facility] Adult Mental Health Day Treatment  TRACK: 6b    NUMBER OF PARTICIPANTS: 7    Summary of Group / Topics Discussed:  Health Maintenance: Discharge planning/Community resources: Patients worked on completing an instructor-facilitated discharge planning activity. Discharge planning begins for all patients after admission. Competent discharge planning promotes a successful transition and decreases the likelihood of mental health relapse. In this group, all dimensions of wellness were  reviewed to assess for needs/discharge readiness. These dimensions included: physical, emotional, occupational/productivity, environmental, social, spiritual, intellectual, and financial. Patients worked on completing/updating their discharge planning and identifying their treatment needs prior to time of discharge.     Patient Session Goals / Objectives:  ? Identified unmet treatment needs to accomplish before discharge  ? Completed all dimensions of the discharge planning packet  ? Participated in the planning process, make phone calls, set up appointments, got connected with community resources, followed up with treatment team as needed         Patient Participation / Response:  Fully participated with the group by sharing personal reflections / insights and openly received / provided feedback with other participants.    Demonstrated understanding of topics discussed through group discussion and participation  The client participated in an activity to describe plans for discharge and talked to the group about supports and providers that they had. The client shared whether they wanted to see a therapist now or later and who were supportive friends.      Treatment Plan:  Patient has a current master individualized treatment plan.  See Epic treatment plan for more information.    Kait Duke Psy., D,  Licensed Clinical Psychologist

## 2023-03-20 NOTE — GROUP NOTE
Process Group Note                                    Service Modality:  Video Visit     Telemedicine Visit: The patient's condition can be safely assessed and treated via synchronous audio and visual telemedicine encounter.      Reason for Telemedicine Visit: Patient has requested telehealth visit, Patient unable to travel, Patient convenience (e.g. access to timely appointments / distance to available provider), Patient lives in a designated Health Professional Shortage Area (HPSA), and Services only offered telehealth    Originating Site (Patient Location): Patient's home    Distant Site (Provider Location): [facility]: [facility], [facility]    Consent:  The patient/guardian has verbally consented to: the potential risks and benefits of telemedicine (video visit) versus in person care; bill my insurance or make self-payment for services provided; and responsibility for payment of non-covered services.     Patient would like the video invitation sent by:  My Chart    Mode of Communication:  Video Conference via Medical Zoom    As the provider I attest to compliance with applicable laws and regulations related to telemedicine.        PATIENT'S NAME: Maddy Ortiz  MRN:   [id number]  :   1985  ACCT. NUMBER: [id number]  DATE OF SERVICE: 3/20/23  START TIME:  1:00 PM  END TIME:  1:50 PM  FACILITATOR: Nely Arthur PsyD  TOPIC:  Process Group    Diagnoses:  295.70  (F25) Schizoaffective Disorder Bipolar Type     300.02 (F41.1) Generalized Anxiety Disorder     Patient reported history of diagnoses: ADHD; an anxiety disorder; a bipolar disorder; depression; an eating disorder; a personality disorder; PTSD; schizophrenia.    Psychiatry:  Shirley at [facility]  therapist  [facility]  [place] Primary Care Provider  Shirley Freeman.   [facility] Worker with [facility]   with [facility]   [facility] worker twice per week,      [facility] Adult  Mental Health Day Treatment  TRACK: 6B    NUMBER OF PARTICIPANTS: 5          Data:    Session content: At the start of this group, patients were invited to check in by identifying themselves, describing their current emotional status, and identifying issues to address in this group.   Area(s) of treatment focus addressed in this session included Symptom Management, Personal Safety and Community Resources/Discharge Planning.  Client reported being safe today.  Reported mood is anxious.    Goal for today is to attend group therapy online.  The client talked to the group about how she walked to the food shelf and got food, and carried it home, and that she had a sore back. She reported that she wanted to get a couch, but can't afford it right now and talked to others about decorating her apartment. She reported that has been on a decorating website and passed it on to the group.     Therapeutic Interventions/Treatment Strategies:  Psychotherapist reinforced use of skills. Treatment modalities used include Cognitive Behavioral Therapy and Dialectical Behavioral Therapy. Interventions include Relapse Prevention: Coached on skills to manage factors that contribute to relapse, Mindfulness: Encouraged a plan to use mindfulness skills in daily life, Symptoms Management: Promoted understanding of their diagnoses and how it impacts their functioning and Emotions Management:  Reviewed opposite action skill.    Assessment:    Patient response:   Patient responded to session by accepting feedback, listening and focusing on goals    Possible barriers to participation / learning include: severity of symptoms    Health Issues:   None reported  Substance Use Review:   Substance Use: No active concerns identified.    Mental Status/Behavioral Observations  Appearance:   Appropriate   Eye Contact:   Good   Psychomotor Behavior: Normal   Attitude:   Cooperative   Orientation:   All  Speech   Rate / Production: Normal    Volume:  Normal    Mood:    Anxious  Depressed   Affect:    Constricted   Thought Content:   Rumination and Psychosis reports paranoia  Thought Form:  Coherent  Logical     Insight:    Good     Plan:     Safety Plan: Recommended that patient call 911 or go to the local ED should there be a change in any of these risk factors.     Barriers to treatment: None identified    Patient Contracts (see media tab):  None    Substance Use: Provided encouragement towards sobriety     Continue or Discharge: Patient will continue in Adult Day Treatment (ADT)  as planned. Patient is likely to benefit from learning and using skills as they work toward the goals identified in their treatment plan.      Nely Arthur PsyD  March 20, 2023  Dariusz Spaulding, DARNELL,  Licensed Clinical Psychologist

## 2023-03-20 NOTE — GROUP NOTE
Psychoeducation Group Note    PATIENT'S NAME: Maddy Ortiz  MRN:   [id number]  :   1985  ACCT. NUMBER: [id number]  DATE OF SERVICE: 3/20/23  START TIME:  3:00 PM  END TIME:  3:50 PM  FACILITATOR: Demetria Cage LICSW; Johnny Maldonado, OTR/L  TOPIC: MH Life Skills Group: Communication and Social Skills Development  Service Modality:  Video Visit     Telemedicine Visit: The patient's condition can be safely assessed and treated via synchronous audio and visual telemedicine encounter.       Reason for Telemedicine Visit: Services only offered telehealth and due to COVID-19.     Originating Site (Patient Location): Patient's home     Distant Site (Provider Location): Provider Remote Setting- Home Office     Consent:  The patient/guardian has verbally consented to: the potential risks and benefits of telemedicine (video visit) versus in person care; bill my insurance or make self-payment for services provided; and responsibility for payment of non-covered services.      Patient would like the video invitation sent by:  My Chart     Mode of Communication:  Video Conference via Medical Zoom     As the provider I attest to compliance with applicable laws and regulations related to telemedicine.    [facility] Adult Mental Health Day Treatment  TRACK: 6B    NUMBER OF PARTICIPANTS: 6    Summary of Group / Topics Discussed:  Communication and Social Skills Development: Healthy Social Supports in Mental Health Recovery : Reaching Out: Patients were taught and gained awareness of the importance of asking for help from other people as a way to expand their support network.  Patients identified both personal strengths and barriers in asking for help.  Patients were provided with skills and opportunity to practice asking for help to improve overall communication and connection with other people.      Patient Session Goals / Objectives:    Identified strengths and opportunities for growth in asking for help  and how this impacts their ability to clearly communicate needs to other people       Improved awareness of important aspects of asking for help and support and how this relates to mental health recovery        Established a plan for practice of these skills in their own environments    Practiced and reflected on how to generalize taught skills to their everyday life      Patient Participation / Response:  Fully participated with the group by sharing personal reflections / insights and openly received / provided feedback with other participants.    Verbalized understanding of content    Treatment Plan:  Patient has a current master individualized treatment plan.  See Epic treatment plan for more information.    Demetria Cage, LICSW

## 2023-03-21 ENCOUNTER — HOSPITAL ENCOUNTER (OUTPATIENT)
Dept: BEHAVIORAL HEALTH | Facility: CLINIC | Age: 38
Discharge: HOME | End: 2023-03-21
Attending: PSYCHIATRY & NEUROLOGY
Payer: COMMERCIAL

## 2023-03-21 PROCEDURE — 90853 GROUP PSYCHOTHERAPY: CPT | Mod: GT | Performed by: PSYCHOLOGIST

## 2023-03-21 NOTE — GROUP NOTE
Psychotherapy Group Note                                    Service Modality:  Video Visit     Telemedicine Visit: The patient's condition can be safely assessed and treated via synchronous audio and visual telemedicine encounter.      Reason for Telemedicine Visit: Patient has requested telehealth visit, Patient unable to travel, Patient convenience (e.g. access to timely appointments / distance to available provider), Patient lives in a designated Health Professional Shortage Area (HPSA), and Services only offered telehealth    Originating Site (Patient Location): Patient's home    Distant Site (Provider Location): [facility]: [facility], [facility]    Consent:  The patient/guardian has verbally consented to: the potential risks and benefits of telemedicine (video visit) versus in person care; bill my insurance or make self-payment for services provided; and responsibility for payment of non-covered services.     Patient would like the video invitation sent by:  My Chart    Mode of Communication:  Video Conference via Medical Zoom    As the provider I attest to compliance with applicable laws and regulations related to telemedicine.        PATIENT'S NAME: Maddy Ortiz  MRN:   [id number]  :   1985  ACCT. NUMBER: [id number]  DATE OF SERVICE: 3/21/23  START TIME:  2:00 PM  END TIME:  2:50 PM  FACILITATOR: Nely Arthur PsyD  TOPIC:  EBP Group: Emotions Management  [facility] Adult Mental Health Day Treatment  TRACK: 6    NUMBER OF PARTICIPANTS: 6B    Summary of Group / Topics Discussed:  Emotions Management: Check Facts: Patients participated in an interactive approach to identifying and challenging cognitive distortions that arise following an event that triggers intense emotion.  Patients choose an emotional reaction/event to work on.  The group shared their experiences and thought processes for feedback.      Patient Session Goals / Objectives:    Learn the process of identifying  thoughts associated with the situation and reaction    Learn to challenge cognitive distortions and reframe the situation/event/reaction     Distinguish between facts, feelings, thoughts    Gain understanding of how to interpret an emotional reaction    Practice identification of cognitive distortions and evaluating an emotionally charged situation more rationally/objectively/mindfully      Patient Participation / Response:  Fully participated with the group by sharing personal reflections / insights and openly received / provided feedback with other participants.    Self-aware of experiences with difficult emotions, and strategies to employ to manage them  The client participated in an activity that examined thoughts, feelings, and actions and how they influence each other. The client discussed different emotions that were strong and how they interrupted their routine and caused problems. The client discussed opposite action and [employer] Mind for creating new thoughts and managing to check out situations.  Treatment Plan:  Patient has a current master individualized treatment plan.  See Epic treatment plan for more information.    Kait Duke Psy., D,  Licensed Clinical Psychologist

## 2023-03-23 ENCOUNTER — HOSPITAL ENCOUNTER (OUTPATIENT)
Dept: BEHAVIORAL HEALTH | Facility: CLINIC | Age: 38
Discharge: HOME | End: 2023-03-23
Attending: PSYCHIATRY & NEUROLOGY
Payer: COMMERCIAL

## 2023-03-23 PROCEDURE — 90853 GROUP PSYCHOTHERAPY: CPT | Mod: GT | Performed by: PSYCHOLOGIST

## 2023-03-23 PROCEDURE — 90853 GROUP PSYCHOTHERAPY: CPT | Mod: GT | Performed by: SOCIAL WORKER

## 2023-03-23 NOTE — GROUP NOTE
Psychoeducation Group Note                                    Service Modality:  Video Visit     Telemedicine Visit: The patient's condition can be safely assessed and treated via synchronous audio and visual telemedicine encounter.      Reason for Telemedicine Visit: Patient has requested telehealth visit, Patient unable to travel, Patient convenience (e.g. access to timely appointments / distance to available provider), Patient lives in a designated Health Professional Shortage Area (HPSA), and Services only offered telehealth    Originating Site (Patient Location): Patient's home    Distant Site (Provider Location): [facility]: [facility], [facility]    Consent:  The patient/guardian has verbally consented to: the potential risks and benefits of telemedicine (video visit) versus in person care; bill my insurance or make self-payment for services provided; and responsibility for payment of non-covered services.     Patient would like the video invitation sent by:  My Chart    Mode of Communication:  Video Conference via Medical Zoom    As the provider I attest to compliance with applicable laws and regulations related to telemedicine.        PATIENT'S NAME: Maddy Ortiz  MRN:   [id number]  :   1985  ACCT. NUMBER: [id number]  DATE OF SERVICE: 3/23/23  START TIME:  2:00 PM  END TIME:  2:50 PM  FACILITATOR: Nely Arthur PsyD  TOPIC:  Wellness Group: Health Maintenance  [facility] Adult Mental Health Day Treatment  TRACK: 6B    NUMBER OF PARTICIPANTS: 8    Summary of Group / Topics Discussed:  Health Maintenance: Discharge planning/Community resources: Patients worked on completing an instructor-facilitated discharge planning activity. Discharge planning begins for all patients after admission. Competent discharge planning promotes a successful transition and decreases the likelihood of mental health relapse. In this group, all dimensions of wellness were reviewed to assess for  needs/discharge readiness. These dimensions included: physical, emotional, occupational/productivity, environmental, social, spiritual, intellectual, and financial. Patients worked on completing/updating their discharge planning and identifying their treatment needs prior to time of discharge.     Patient Session Goals / Objectives:  ? Identified unmet treatment needs to accomplish before discharge  ? Completed all dimensions of the discharge planning packet  ? Participated in the planning process, make phone calls, set up appointments, got connected with community resources, followed up with treatment team as needed         Patient Participation / Response:  Fully participated with the group by sharing personal reflections / insights and openly received / provided feedback with other participants.    Demonstrated understanding of topics discussed through group discussion and participation  The client participated in a structured activity that examined goals for discharge and what areas that they wanted to address. The client discussed leisure, health, finances, spirituality, and relationships.Maddy discussed going to [employer] and caring for her daughter.      Treatment Plan:  Patient has a current master individualized treatment plan.  See Epic treatment plan for more information.    Kait Duke Psy., D,  Licensed Clinical Psychologist

## 2023-03-23 NOTE — GROUP NOTE
Psychoeducation Group Note    PATIENT'S NAME: Maddy Ortiz  MRN:   [id number]  :   1985  ACCT. NUMBER: [id number]  DATE OF SERVICE: 3/23/23  START TIME:  3:00 PM  END TIME:  3:50 PM  FACILITATOR: Demetria Cage LICSW  TOPIC: MH Life Skills Group: Resiliency Development  Service Modality:  Video Visit     Telemedicine Visit: The patient's condition can be safely assessed and treated via synchronous audio and visual telemedicine encounter.       Reason for Telemedicine Visit: Services only offered telehealth and due to COVID-19.     Originating Site (Patient Location): Patient's home     Distant Site (Provider Location): Provider Remote Setting- Home Office     Consent:  The patient/guardian has verbally consented to: the potential risks and benefits of telemedicine (video visit) versus in person care; bill my insurance or make self-payment for services provided; and responsibility for payment of non-covered services.      Patient would like the video invitation sent by:  My Chart     Mode of Communication:  Video Conference via Medical Zoom     As the provider I attest to compliance with applicable laws and regulations related to telemedicine.    [facility] Adult Mental Health Day Treatment  TRACK: 6B    NUMBER OF PARTICIPANTS: 5    Summary of Group / Topics Discussed:  Resiliency Development:  Coping Skills (PROM-Personal Recovery Outcome Measure): Patients were taught how to identify stressors, signs of stress, coping skills, and prevention strategies for overall stress management.  Patients were given the opportunity to identify both ongoing and acute mental health symptoms and how to effectively manage these symptoms by developing an effective aftercare plan.  Patients increased awareness of community based resources.    Patient Session Goals / Objectives:    Identified how using coping skills can be used for symptom and stress management       Improved awareness of individualed symptoms and  stressors and how to effectively cope     Established a relapse prevention plan to practice these skills in their own environments    Practiced and reflected on how to generalize taught skills to their everyday life        Patient Participation / Response:  Fully participated with the group by sharing personal reflections / insights and openly received / provided feedback with other participants.    Verbalized understanding of content    Treatment Plan:  Patient has a current master individualized treatment plan.  See Epic treatment plan for more information.    Demetria Cage, NELLIESW

## 2023-03-23 NOTE — GROUP NOTE
Process Group Note                                    Service Modality:  Video Visit     Telemedicine Visit: The patient's condition can be safely assessed and treated via synchronous audio and visual telemedicine encounter.      Reason for Telemedicine Visit: Patient has requested telehealth visit, Patient unable to travel, Patient convenience (e.g. access to timely appointments / distance to available provider), Patient lives in a designated Health Professional Shortage Area (HPSA), and Services only offered telehealth    Originating Site (Patient Location): Patient's home    Distant Site (Provider Location): [facility]: [facility], [facility]    Consent:  The patient/guardian has verbally consented to: the potential risks and benefits of telemedicine (video visit) versus in person care; bill my insurance or make self-payment for services provided; and responsibility for payment of non-covered services.     Patient would like the video invitation sent by:  My Chart    Mode of Communication:  Video Conference via Medical Zoom    As the provider I attest to compliance with applicable laws and regulations related to telemedicine.        PATIENT'S NAME: Maddy Ortiz  MRN:   [id number]  :   1985  ACCT. NUMBER: [id number]  DATE OF SERVICE: 3/23/23  START TIME:  1:00 PM  END TIME:  1:50 PM  FACILITATOR: Nely Arthur PsyD  TOPIC:  Process Group    Diagnoses:  295.70  (F25) Schizoaffective Disorder Bipolar Type     300.02 (F41.1) Generalized Anxiety Disorder     Patient reported history of diagnoses: ADHD; an anxiety disorder; a bipolar disorder; depression; an eating disorder; a personality disorder; PTSD; schizophrenia.    Psychiatry:  [facility]irley at [facility]  therapist  [facility]  [place] [facility] Provider  Shirley Freeman.   [facility] Worker with [facility]   with [facility]   [facility] worker twice per week,      [facility] Adult  Mental Health Day Treatment  TRACK: 6B    NUMBER OF PARTICIPANTS: 8          Data:    Session content: At the start of this group, patients were invited to check in by identifying themselves, describing their current emotional status, and identifying issues to address in this group.   Area(s) of treatment focus addressed in this session included Symptom Management, Personal Safety and Community Resources/Discharge Planning.  Client reported being safe today.  Reported mood is anxious.    Goal for today is to attend group therapy online and talk with others. The client talked to the group about how she went to [employer] and felt it was meditative to pray. She reported that she liked having a routine and rules and talked to the group about , the [employer], women who are abused, and pregnancy. She was reminded that the group emphasized self-compassion and that the group would not  or call names to men or women who were abused. She reported problems in the past at a job, when she was pregnant with her daughter and reported that she felt criticized by others. She stated that she will only go to [employer] and not get involved in other groups because relationships have been difficult. She reported that she would like to lead a prayer group and be a liturgical .  She stated that she has an online game where she can be a  and say prayers to a group of online people and enjoys it.       Therapeutic Interventions/Treatment Strategies:  Psychotherapist reinforced use of skills. Treatment modalities used include Cognitive Behavioral Therapy and Dialectical Behavioral Therapy. Interventions include Coping Skills: Promoted understanding of how and when to apply grounding strategies to reduce distress and increase presence in the moment, Relapse Prevention: Coached on skills to manage factors that contribute to relapse, Mindfulness: Encouraged a plan to use mindfulness skills in daily life and Symptoms  Management: Promoted understanding of their diagnoses and how it impacts their functioning.    Assessment:    Patient response:   Patient responded to session by listening, focusing on goals and being attentive    Possible barriers to participation / learning include: severity of symptoms    Health Issues:   None reported       Substance Use Review:   Substance Use: No active concerns identified.    Mental Status/Behavioral Observations  Appearance:   Appropriate   Eye Contact:   Good   Psychomotor Behavior: Normal   Attitude:   Cooperative   Orientation:   All  Speech   Rate / Production: Normal    Volume:  Normal   Mood:    Anxious  Depressed   Affect:    Constricted   Thought Content:   Rumination and Psychosis reports paranoia  Thought Form:  Coherent  Logical  Tangential     Insight:    Good     Plan:     Safety Plan: Recommended that patient call 911 or go to the local ED should there be a change in any of these risk factors.     Barriers to treatment: None identified    Patient Contracts (see media tab):  None    Substance Use: Provided encouragement towards sobriety     Continue or Discharge: Patient will continue in Adult Day Treatment (ADT)  as planned. Patient is likely to benefit from learning and using skills as they work toward the goals identified in their treatment plan.      Nely Arthur PsyD  March 23, 2023  Dariusz Spaulding D,  Licensed Clinical Psychologist

## 2023-03-23 NOTE — LETTER
Health Care Home - Access Care Plan    About Me:    Patient Name:  Maddy Ortiz    YOB: 1985  Age:                      35 year old   [place] MRN:     [id number] Telephone Information:   Home Phone [phone]   Mobile [phone]       Address:  [address] [place] [address] [address]  [facility] [address] Email address:  [email or web address]      Emergency Contact(s)   Name Relationship Lgl Grd Work Phone Home Phone Mobile Phone   1. RAMY ORTIZ Mother No  [phone]    2. KASHIF ORTIZ Father No  [phone]    3. KASHIF DIAZ Friend No none [phone]             Health Maintenance: Routine Health maintenance Reviewed: Due/Overdue   Health Maintenance Due   Topic Date Due     HEPATITIS B IMMUNIZATION (2 of 3 - 3-dose primary series) 03/16/1998     My Access Plan  Medical Emergency 911   Questions or concerns during clinic hours Primary Clinic Line, I will call the clinic directly: [facility] - [phone]   24 Hour Appointment Line [phone] or  [phone] [facility] ([phone]) (toll free)   24 Hour Nurse Line [phone] (toll free)   Questions or concerns outside clinic hours 24 Hour Appointment Line, I will call the after-hours on-call line:   [facility] [phone] or [phone] [facility] ([phone]) (toll-free)   Preferred Urgent Care [facility], [phone]   Preferred [facility] [place] [place]  [phone]   Preferred Pharmacy [facility] - [place], [place] - [address]     Behavioral Health Crisis Line The National Suicide Prevention Lifeline at 1-571.246.2705 or 911       My Care Team Members  Patient Care Team       Relationship Specialty Notifications Start End    Shirley Freeman APRN CNP PCP - General Nurse Practitioner  5/24/18     Phone: [phone] Fax: [phone] [address] [facility] [address]    Christie [facility] worker   11/21/13  Patient inserted both her upper and lower dentures into her mouth prior to discharge home.     [facility]    Phone: [phone]         [address] [place] [address].      Eileen Stone   Licensed Mental Health  11/21/13     [facility]      Phone: [phone]         [facility] Consults Occupational Therapist Licensed Mental Health  7/16/17     Mental Health OT sessions; attends sessions 2x/week    Phone: [phone]         [address] [facility], [address], [place] [address]                            Aggie Lehman MD MD INTERNAL MEDICINE - ENDOCRINOLOGY, DIABETES & METABOLISM  5/4/18     Phone: [phone] Fax: [phone]         [address] [place] [address] [address] [facility] [address]    Shirley Freeman APRN CNP Assigned PCP   6/17/18     Phone: [phone] Fax: [phone] [address] [facility] [address]    Alexander Montemayor MD MD Psychiatry  7/16/18     [facility] Location:  [phone]    Phone: [phone] Fax: [phone]         [facility] [address] [facility] [address]    [facility] Therapist Licensed Mental Health  1/1/19     Sridevi Whitehead    Phone: [phone]         Rudy Shin MD Assigned Allergy Provider   10/23/20     Phone: [phone] Fax: [phone]         [address] [facility] [address]    Rachid Garza DPM Assigned Musculoskeletal Provider   4/18/21     Phone: [phone] Pager: [phone] Fax: [phone] [address] [address]           My Medical and Care Information  Problem List   Patient Active Problem List   Diagnosis     Animal dander allergy     CARDIOVASCULAR SCREENING; LDL GOAL LESS THAN 160     Psychosis (H)     Paranoid type delusional disorder (H)     Bipolar 1 disorder (H)     Insomnia     Depression with anxiety     Seasonal allergic rhinitis due to pollen     Allergic rhinitis due to mold     Allergic rhinitis due to animal dander     Allergic rhinitis due to dust mite      Encounter for IUD insertion     Schizoaffective disorder, bipolar type (H)     Gastroesophageal reflux disease without esophagitis     Paranoia (psychosis) (H)     Obesity (BMI 35.0-39.9) with comorbidity (H)     Schizoaffective disorder (H)      Current Medications and Allergies:    Current Outpatient Medications   Medication     levonorgestrel-ethinyl estradiol (AVIANE) 0.1-20 MG-MCG tablet     lithium ER (LITHOBID) 300 MG CR tablet     Melatonin 10 MG TABS tablet     omeprazole (PRILOSEC) 20 MG DR capsule     VRAYLAR 6 MG CAPS capsule     albuterol (PROAIR HFA/PROVENTIL HFA/VENTOLIN HFA) 108 (90 Base) MCG/ACT inhaler     benztropine (COGENTIN) 0.5 MG tablet     EPINEPHrine (EPIPEN/ADRENACLICK/OR ANY BX GENERIC EQUIV) 0.3 MG/0.3ML injection 2-pack     hydrOXYzine (ATARAX) 10 MG tablet     lactobacillus rhamnosus, GG, (CULTURELL) capsule     ORDER FOR ALLERGEN IMMUNOTHERAPY     ORDER FOR ALLERGEN IMMUNOTHERAPY     ORDER FOR ALLERGEN IMMUNOTHERAPY     ORDER FOR ALLERGEN IMMUNOTHERAPY     terbinafine (LAMISIL AT) 1 % external cream     No current facility-administered medications for this visit.

## 2023-03-27 ENCOUNTER — HOSPITAL ENCOUNTER (OUTPATIENT)
Dept: BEHAVIORAL HEALTH | Facility: CLINIC | Age: 38
Discharge: HOME | End: 2023-03-27
Attending: PSYCHIATRY & NEUROLOGY
Payer: COMMERCIAL

## 2023-03-27 PROCEDURE — 90853 GROUP PSYCHOTHERAPY: CPT | Mod: GT | Performed by: SOCIAL WORKER

## 2023-03-27 PROCEDURE — 90853 GROUP PSYCHOTHERAPY: CPT | Mod: GT | Performed by: PSYCHOLOGIST

## 2023-03-27 NOTE — GROUP NOTE
Process Group Note                                    Service Modality:  Video Visit     Telemedicine Visit: The patient's condition can be safely assessed and treated via synchronous audio and visual telemedicine encounter.      Reason for Telemedicine Visit: Patient has requested telehealth visit, Patient unable to travel, Patient convenience (e.g. access to timely appointments / distance to available provider), Patient lives in a designated Health Professional Shortage Area (HPSA), and Services only offered telehealth    Originating Site (Patient Location): Patient's home    Distant Site (Provider Location): [facility]: [facility], [facility]    Consent:  The patient/guardian has verbally consented to: the potential risks and benefits of telemedicine (video visit) versus in person care; bill my insurance or make self-payment for services provided; and responsibility for payment of non-covered services.     Patient would like the video invitation sent by:  My Chart    Mode of Communication:  Video Conference via Medical Zoom    As the provider I attest to compliance with applicable laws and regulations related to telemedicine.        PATIENT'S NAME: Maddy Ortiz  MRN:   [id number]  :   1985  ACCT. NUMBER: [id number]  DATE OF SERVICE: 3/27/23  START TIME:  1:00 PM  END TIME:  1:50 PM  FACILITATOR: Nely Arthur PsyD  TOPIC:  Process Group    Diagnoses:  295.70  (F25) Schizoaffective Disorder Bipolar Type     300.02 (F41.1) Generalized Anxiety Disorder     Patient reported history of diagnoses: ADHD; an anxiety disorder; a bipolar disorder; depression; an eating disorder; a personality disorder; PTSD; schizophrenia.    Psychiatry:  Shirley at [facility]  therapist  [facility]  [place] Primary Care Provider  Shirley Freeman.   [facility] Worker with [facility]   with [facility]   [facility] worker twice per week,      [facility] Adult  Mental Health Day Treatment  TRACK: 6B    NUMBER OF PARTICIPANTS: 8          Data:    Session content: At the start of this group, patients were invited to check in by identifying themselves, describing their current emotional status, and identifying issues to address in this group.   Area(s) of treatment focus addressed in this session included Symptom Management, Personal Safety and Community Resources/Discharge Planning.  Client reported being safe today.  Reported mood is anxious.   Goal for today is to attend group therapy online and talk with others.    The client talked to the group about how she will not talk with a previous partner, since her brothers and her family didn't like him. She reported that she will try it, since there had been arguments in the past. She reported going to [employer] and feeling peaceful each day, playing [employer] life as an avatar online, and cooking for her daughter. She reported feeling guilty about sinning and talked to the group about managing the feeling.      Therapeutic Interventions/Treatment Strategies:  Psychotherapist reinforced use of skills. Treatment modalities used include Cognitive Behavioral Therapy and Dialectical Behavioral Therapy. Interventions include Coping Skills: Assisted patient in understanding the purpose of planning / creating / participating / sharing in positive experiences, Relapse Prevention: Assisted patient in identifying personal vulnerabilities, thoughts, emotions, and situations that may lead to relapse , Mindfulness: Facilitated discussion of when/how to use mindfulness skills to benefit general health, mental health symptoms, and stressors and Symptoms Management: Promoted understanding of their diagnoses and how it impacts their functioning.    Assessment:    Patient response:   Patient responded to session by giving feedback, listening and focusing on goals    Possible barriers to participation / learning include: severity of symptoms    Health  Issues:   None reported       Substance Use Review:   Substance Use: No active concerns identified.    Mental Status/Behavioral Observations  Appearance:   Appropriate   Eye Contact:   Good   Psychomotor Behavior: Normal   Attitude:   Cooperative   Orientation:   All  Speech   Rate / Production: Normal    Volume:  Normal   Mood:    anxious  Affect:    constricted  Thought Content:   delusions  Thought Form:  Coherent  Logical     Insight:    Good     Plan:     Safety Plan: Recommended that patient call 911 or go to the local ED should there be a change in any of these risk factors.     Barriers to treatment: None identified    Patient Contracts (see media tab):  None    Substance Use: Provided encouragement towards sobriety     Continue or Discharge: Patient will continue in Adult Day Treatment (ADT)  as planned. Patient is likely to benefit from learning and using skills as they work toward the goals identified in their treatment plan.      Nely Arthur PsyD  March 27, 2023  Dariusz Spaulding, D,  Licensed Clinical Psychologist

## 2023-03-27 NOTE — GROUP NOTE
Psychoeducation Group Note    PATIENT'S NAME: Maddy Ortiz  MRN:   [id number]  :   1985  ACCT. NUMBER: [id number]  DATE OF SERVICE: 3/27/23  START TIME:  3:00 PM  END TIME:  3:50 PM  FACILITATOR: Demetria Cage, SABRA; Johnny Maldonado, OTR/L  TOPIC: MH Life Skills Group: Lifestyle Balance and Structure  Service Modality:  Video Visit     Telemedicine Visit: The patient's condition can be safely assessed and treated via synchronous audio and visual telemedicine encounter.       Reason for Telemedicine Visit: Services only offered telehealth and due to COVID-19.     Originating Site (Patient Location): Patient's home     Distant Site (Provider Location): Provider Remote Setting- Home Office     Consent:  The patient/guardian has verbally consented to: the potential risks and benefits of telemedicine (video visit) versus in person care; bill my insurance or make self-payment for services provided; and responsibility for payment of non-covered services.      Patient would like the video invitation sent by:  My Chart     Mode of Communication:  Video Conference via Medical Zoom     As the provider I attest to compliance with applicable laws and regulations related to telemedicine.    [facility] Adult Mental Health Day Treatment  TRACK: 6B    NUMBER OF PARTICIPANTS: 8    Summary of Group / Topics Discussed:  Lifestyle Balance and Strucure:  Routines, Habits, Rituals, and Roles: Patients were assisted to identify meaningful roles that they want to promote and the impact their mental health symptoms have on this.  Patients learned, applied, and generalized skills needed to live and participate in meaningful roles as effectively and independently as possible.  Patients developed awareness of strengths and challenges in fulfilling these roles and worked on integrating specific and individualized rituals and habits into their daily life.     Patient Session Goals / Objectives:    Improved awareness and  engagement in life s meaningful roles, routines, habits, and rituals    Explored and identified current roles and challenges due to mental health symptoms and stress    Identified ways to establish and integrate daily self care and wellness routines and habits to support mental health recovery    Practiced and reflected on how to generalize taught skills to their everyday life      Patient Participation / Response:  Fully participated with the group by sharing personal reflections / insights and openly received / provided feedback with other participants.    Verbalized understanding of content    Treatment Plan:  Patient has a current master individualized treatment plan.  See Epic treatment plan for more information.    Demetria Cage, LICSW

## 2023-03-28 ENCOUNTER — HOSPITAL ENCOUNTER (OUTPATIENT)
Dept: BEHAVIORAL HEALTH | Facility: CLINIC | Age: 38
Discharge: HOME | End: 2023-03-28
Attending: PSYCHIATRY & NEUROLOGY
Payer: COMMERCIAL

## 2023-03-28 PROCEDURE — 90853 GROUP PSYCHOTHERAPY: CPT | Mod: GT | Performed by: PSYCHOLOGIST

## 2023-03-28 NOTE — GROUP NOTE
Psychotherapy Group Note                                    Service Modality:  Video Visit     Telemedicine Visit: The patient's condition can be safely assessed and treated via synchronous audio and visual telemedicine encounter.      Reason for Telemedicine Visit: Patient has requested telehealth visit, Patient unable to travel, Patient convenience (e.g. access to timely appointments / distance to available provider), Patient lives in a designated Health Professional Shortage Area (HPSA), and Services only offered telehealth    Originating Site (Patient Location): Patient's home    Distant Site (Provider Location): [facility] Hospital: [facility], [facility]    Consent:  The patient/guardian has verbally consented to: the potential risks and benefits of telemedicine (video visit) versus in person care; bill my insurance or make self-payment for services provided; and responsibility for payment of non-covered services.     Patient would like the video invitation sent by:  My Chart    Mode of Communication:  Video Conference via Medical Zoom    As the provider I attest to compliance with applicable laws and regulations related to telemedicine.        PATIENT'S NAME: Maddy Ortiz  MRN:   [id number]  :   1985  ACCT. NUMBER: [id number]  DATE OF SERVICE: 3/28/23  START TIME:  2:00 PM  END TIME:  2:50 PM  FACILITATOR: Nely Arthur PsyD  TOPIC:  EBP Group: Mindfulness  [facility] Adult Mental Health Day Treatment  TRACK: 6B    NUMBER OF PARTICIPANTS: 5    Summary of Group / Topics Discussed:  Mindfulness: Mindfulness Skills: Patients received information on the main components of mindfulness. Patients participated in discussion on how to practice observing, describing, and participating in internal and external environments. Relevance of mindfulness skills to overall mental and physical health was explored.  Patients explored and discussed in group their current awareness and knowledge of  mindfulness skills as well as barriers to applying skills.    Patient Session Goals / Objectives:    Demonstrated and verbalized understanding of key mindfulness concepts    Identified when/how to use mindfulness skills    Resolved barriers to practicing mindfulness skills    Identified plan to use mindfulness skills in daily life       Patient Participation / Response:  Fully participated with the group by sharing personal reflections / insights and openly received / provided feedback with other participants.    Verbalized understanding of how mindfulness can benefit mental health symptoms  The client participated in a mindfulness activity and discussed self-compassion, value of mindfulness, deep breathing, and self-kindness. The client discussed 3 Good Things and shared different gratitude statements with others. The client identified accomplishments and enjoyable activities.  Treatment Plan:  Patient has a current master individualized treatment plan.  See Epic treatment plan for more information.    Kait Duke Psy., D,  Licensed Clinical Psychologist

## 2023-03-28 NOTE — GROUP NOTE
Process Group Note                                    Service Modality:  Video Visit     Telemedicine Visit: The patient's condition can be safely assessed and treated via synchronous audio and visual telemedicine encounter.      Reason for Telemedicine Visit: Patient has requested telehealth visit, Patient unable to travel, Patient convenience (e.g. access to timely appointments / distance to available provider), Patient lives in a designated Health Professional Shortage Area (HPSA), and Services only offered telehealth    Originating Site (Patient Location): Patient's home    Distant Site (Provider Location): [facility] Hospital: [facility], [facility]    Consent:  The patient/guardian has verbally consented to: the potential risks and benefits of telemedicine (video visit) versus in person care; bill my insurance or make self-payment for services provided; and responsibility for payment of non-covered services.     Patient would like the video invitation sent by:  My Chart    Mode of Communication:  Video Conference via Medical Zoom    As the provider I attest to compliance with applicable laws and regulations related to telemedicine.        PATIENT'S NAME: Maddy Ortiz  MRN:   [id number]  :   1985  ACCT. NUMBER: [id number]  DATE OF SERVICE: 3/28/23  START TIME:  1:00 PM  END TIME:  1:50 PM  FACILITATOR: Nely Arthur PsyD  TOPIC:  Process Group    Diagnoses:  295.70  (F25) Schizoaffective Disorder Bipolar Type     300.02 (F41.1) Generalized Anxiety Disorder     Patient reported history of diagnoses: ADHD; an anxiety disorder; a bipolar disorder; depression; an eating disorder; a personality disorder; PTSD; schizophrenia.    Psychiatry:  [facility] at [facility]  therapist  [facility]  [place] [facility] Provider  Shirley Freeman.   [facility] Worker with [facility]   with [facility]   [facility] worker twice per week,      [facility] Adult  Mental Health Day Treatment  TRACK: 6B    NUMBER OF PARTICIPANTS: 4          Data:    Session content: At the start of this group, patients were invited to check in by identifying themselves, describing their current emotional status, and identifying issues to address in this group.   Area(s) of treatment focus addressed in this session included Symptom Management, Personal Safety and Community Resources/Discharge Planning.  Client reported being safe today.  Reported mood is anxious.   Goal for today is to attend group therapy online and talk with others. The client talked to the group about how she walked to [facility] and to [place] and that she was grateful for getting a ride there with a person that she knew. She reported that she felt a loss for not being with her previous partner, Jay Jay, as her family wanted her to not see him and felt he was not a good influence on her. She reported that her family included her on activities and that she was trying to look at the positive on different situations. She reported that the gabapentin helps lower her pain level, but makes her feel drowsy. She talked with the group about her daughter, has hope for her, was trying to be a good role model, and was grateful for her company.       Therapeutic Interventions/Treatment Strategies:  Psychotherapist reinforced use of skills. Treatment modalities used include Cognitive Behavioral Therapy and Dialectical Behavioral Therapy. Interventions include Coping Skills: Discussed meditation skills and addressed ways to implement meditation skills , Relapse Prevention: Coached on skills to manage factors that contribute to relapse, Mindfulness: Facilitated discussion of when/how to use mindfulness skills to benefit general health, mental health symptoms, and stressors and Symptoms Management: Promoted understanding of their diagnoses and how it impacts their functioning.    Assessment:    Patient response:   Patient responded to session by  giving feedback, listening and focusing on goals    Possible barriers to participation / learning include: severity of symptoms    Health Issues:   None reported       Substance Use Review:   Substance Use: No active concerns identified.    Mental Status/Behavioral Observations  Appearance:   Appropriate   Eye Contact:   Good   Psychomotor Behavior: Normal   Attitude:   Cooperative   Orientation:   All  Speech   Rate / Production: Normal    Volume:  Normal   Mood:    Anxious  Sad   Affect:    Constricted   Thought Content:   Rumination and Psychosis denies any symptoms of psychosis  Thought Form:  Coherent  Logical     Insight:    Good     Plan:     Safety Plan: Recommended that patient call 911 or go to the local ED should there be a change in any of these risk factors.     Barriers to treatment: None identified    Patient Contracts (see media tab):  None    Substance Use: Provided encouragement towards sobriety     Continue or Discharge: Patient will continue in Adult Day Treatment (ADT)  as planned. Patient is likely to benefit from learning and using skills as they work toward the goals identified in their treatment plan.      Nely Arthur PsyD  March 28, 2023  Dariusz Spaulding, DARNELL,  Licensed Clinical Psychologist

## 2023-03-30 ENCOUNTER — HOSPITAL ENCOUNTER (OUTPATIENT)
Dept: BEHAVIORAL HEALTH | Facility: CLINIC | Age: 38
Discharge: HOME | End: 2023-03-30
Attending: PSYCHIATRY & NEUROLOGY
Payer: COMMERCIAL

## 2023-03-30 PROCEDURE — 90853 GROUP PSYCHOTHERAPY: CPT | Mod: GT | Performed by: SOCIAL WORKER

## 2023-03-30 PROCEDURE — 90853 GROUP PSYCHOTHERAPY: CPT | Mod: GT | Performed by: PSYCHOLOGIST

## 2023-03-30 NOTE — GROUP NOTE
Psychoeducation Group Note                                    Service Modality:  Video Visit     Telemedicine Visit: The patient's condition can be safely assessed and treated via synchronous audio and visual telemedicine encounter.      Reason for Telemedicine Visit: Patient has requested telehealth visit, Patient unable to travel, Patient convenience (e.g. access to timely appointments / distance to available provider), Patient lives in a designated Health Professional Shortage Area (HPSA), and Services only offered telehealth    Originating Site (Patient Location): Patient's home    Distant Site (Provider Location): [facility] Hospital: [facility], [facility]    Consent:  The patient/guardian has verbally consented to: the potential risks and benefits of telemedicine (video visit) versus in person care; bill my insurance or make self-payment for services provided; and responsibility for payment of non-covered services.     Patient would like the video invitation sent by:  My Chart    Mode of Communication:  Video Conference via Medical Zoom    As the provider I attest to compliance with applicable laws and regulations related to telemedicine.        PATIENT'S NAME: Maddy Ortiz  MRN:   [id number]  :   1985  ACCT. NUMBER: [id number]  DATE OF SERVICE: 3/30/23  START TIME:  2:00 PM  END TIME:  2:50 PM  FACILITATOR: Nely Arthur PsyD; Shirley Cooper RN  TOPIC:  Wellness Group: [facility] Adult Mental Health Day Treatment  TRACK: 6B    NUMBER OF PARTICIPANTS: 6    Summary of Group / Topics Discussed:  Foundations of Health: Sleep: Case study/sleep hygiene: Patients explored the connection between sleep and mental illness. Patients learned about how adequate sleep can improve health, productivity, wellness, quality of life, and safety.     Patient Session Goals / Objectives:  ? Demonstrated understanding of sleep hygiene practices and benefits of sleep  ? Identified  sleep hygiene strategies to utilize     Described the connection between sleep disturbances and mental illness        Patient Participation / Response:  Fully participated with the group by sharing personal reflections / insights and openly received / provided feedback with other participants.    Demonstrated understanding of topics discussed through group discussion and participation  The client participated in a discussion about sleep, getting to sleep, waking at night, and medications. The client discussed with others about personal problems with sleep and habits.  Treatment Plan:  Patient has a current master individualized treatment plan.  See Epic treatment plan for more information.    Kait Duke Psy., D,  Licensed Clinical Psychologist

## 2023-03-30 NOTE — GROUP NOTE
Psychoeducation Group Note    PATIENT'S NAME: Maddy Ortiz  MRN:   [id number]  :   1985  ACCT. NUMBER: [id number]  DATE OF SERVICE: 3/30/23  START TIME:  3:00 PM  END TIME:  3:50 PM  FACILITATOR: Demetria Cage LICSW; Johnny Maldonado, OTR/L  TOPIC: MH Life Skills Group: Resiliency Development  Service Modality:  Video Visit     Telemedicine Visit: The patient's condition can be safely assessed and treated via synchronous audio and visual telemedicine encounter.       Reason for Telemedicine Visit: Services only offered telehealth and due to COVID-19.     Originating Site (Patient Location): Patient's home     Distant Site (Provider Location): Provider Remote Setting- Home Office     Consent:  The patient/guardian has verbally consented to: the potential risks and benefits of telemedicine (video visit) versus in person care; bill my insurance or make self-payment for services provided; and responsibility for payment of non-covered services.      Patient would like the video invitation sent by:  My Chart     Mode of Communication:  Video Conference via Medical Zoom     As the provider I attest to compliance with applicable laws and regulations related to telemedicine.    [facility] Adult Mental Health Day Treatment  TRACK: 6B    NUMBER OF PARTICIPANTS: 6    Summary of Group / Topics Discussed:  Resiliency Development:  Coping Skills (Mental Health Check In): Patients were taught how to identify stressors, signs of stress, coping skills, and prevention strategies for overall stress management.  Patients were given the opportunity to identify both ongoing and acute mental health symptoms and how to effectively manage these symptoms by developing an effective aftercare plan.  Patients increased awareness of community based resources.    Patient Session Goals / Objectives:    Identified how using coping skills can be used for symptom and stress management       Improved awareness of individualed  symptoms and stressors and how to effectively cope     Established a relapse prevention plan to practice these skills in their own environments    Practiced and reflected on how to generalize taught skills to their everyday life        Patient Participation / Response:  Fully participated with the group by sharing personal reflections / insights and openly received / provided feedback with other participants.    Verbalized understanding of content    Treatment Plan:  Patient has a current master individualized treatment plan.  See Epic treatment plan for more information.    Demetria Cage, NELLIESW

## 2023-04-02 ENCOUNTER — HEALTH MAINTENANCE LETTER (OUTPATIENT)
Age: 38
End: 2023-04-02

## 2023-04-03 ENCOUNTER — HOSPITAL ENCOUNTER (OUTPATIENT)
Dept: BEHAVIORAL HEALTH | Facility: CLINIC | Age: 38
Discharge: HOME | End: 2023-04-03
Attending: PSYCHIATRY & NEUROLOGY
Payer: COMMERCIAL

## 2023-04-03 PROCEDURE — 90853 GROUP PSYCHOTHERAPY: CPT | Mod: 95 | Performed by: PSYCHOLOGIST

## 2023-04-03 NOTE — GROUP NOTE
Process Group Note                                    Service Modality:  Video Visit     Telemedicine Visit: The patient's condition can be safely assessed and treated via synchronous audio and visual telemedicine encounter.      Reason for Telemedicine Visit: Patient has requested telehealth visit, Patient unable to travel, Patient convenience (e.g. access to timely appointments / distance to available provider), Patient lives in a designated Health Professional Shortage Area (HPSA), and Services only offered telehealth    Originating Site (Patient Location): Patient's home    Distant Site (Provider Location): [facility] Hospital: [facility], [facility]    Consent:  The patient/guardian has verbally consented to: the potential risks and benefits of telemedicine (video visit) versus in person care; bill my insurance or make self-payment for services provided; and responsibility for payment of non-covered services.     Patient would like the video invitation sent by:  My Chart    Mode of Communication:  Video Conference via Medical Zoom    As the provider I attest to compliance with applicable laws and regulations related to telemedicine.        PATIENT'S NAME: Maddy Ortiz  MRN:   [id number]  :   1985  ACCT. NUMBER: [id number]  DATE OF SERVICE: 23  START TIME:  1:00 PM  END TIME:  1:50 PM  FACILITATOR: Nely Arthur PsyD  TOPIC:  Process Group    Diagnoses:  295.70  (F25) Schizoaffective Disorder Bipolar Type     300.02 (F41.1) Generalized Anxiety Disorder     Patient reported history of diagnoses: ADHD; an anxiety disorder; a bipolar disorder; depression; an eating disorder; a personality disorder; PTSD; schizophrenia.    Psychiatry:  Shirley at [facility]  therapist  [facility]  [place] Primary Care Provider  Shirley Freeman.   [facility] Worker with [facility]   with [facility]   [facility] worker twice per week,      [facility] Adult  "Mental Health Day Treatment  TRACK: 6B    NUMBER OF PARTICIPANTS: 6          Data:    Session content: At the start of this group, patients were invited to check in by identifying themselves, describing their current emotional status, and identifying issues to address in this group.   Area(s) of treatment focus addressed in this session included Symptom Management, Personal Safety and Community Resources/Discharge Planning.  Client reported being safe today.  Reported mood is \"tired.\"    Goal for today is to attend group therapy and care for the home. The client talked to the group about how she cleaned, organized, did chores, cooked, and did laundry. She reported that she didn't go to [facility] because she was too tired. She stated that she went to the dentist and went to the [facility]. She talked to the group about how she and her boyfriend talked to the  about their relationship, how her family doesn't like him, and how she does better when she is on her medications. She reported that her dad will come to get them (her and her daughter), bring them to [place] and then go to her brother's house to celebrate [place]. She reported that she was unsure about her relationship with her boyfriend.       Therapeutic Interventions/Treatment Strategies:  Psychotherapist reinforced use of skills. Treatment modalities used include Cognitive Behavioral Therapy and Dialectical Behavioral Therapy. Interventions include Coping Skills: Discussed meditation skills and addressed ways to implement meditation skills , Relapse Prevention: Coached on skills to manage factors that contribute to relapse, Mindfulness: Facilitated discussion of when/how to use mindfulness skills to benefit general health, mental health symptoms, and stressors and Symptoms Management: Promoted understanding of their diagnoses and how it impacts their functioning.    Assessment:    Patient response:   Patient responded to session by giving feedback, listening and " focusing on goals    Possible barriers to participation / learning include: severity of symptoms    Health Issues:   None reported       Substance Use Review:   Substance Use: No active concerns identified.    Mental Status/Behavioral Observations  Appearance:   Appropriate   Eye Contact:   Good   Psychomotor Behavior: Normal   Attitude:   Cooperative   Orientation:   All  Speech   Rate / Production: Normal    Volume:  Normal   Mood:    Anxious  Depressed   Affect:    Constricted   Thought Content:   Rumination and Psychosis denies any symptoms of psychosis  Thought Form:  Coherent  Logical     Insight:    Good     Plan:     Safety Plan: Recommended that patient call 911 or go to the local ED should there be a change in any of these risk factors.     Barriers to treatment: None identified    Patient Contracts (see media tab):  None    Substance Use: Provided encouragement towards sobriety     Continue or Discharge: Patient will continue in Adult Day Treatment (ADT)  as planned. Patient is likely to benefit from learning and using skills as they work toward the goals identified in their treatment plan.      Nely Arthur PsyD  April 3, 2023  Jacek Spaulding., DARNELL,  Licensed Clinical Psychologist

## 2023-04-03 NOTE — GROUP NOTE
Psychoeducation Group Note                                    Service Modality:  Video Visit     Telemedicine Visit: The patient's condition can be safely assessed and treated via synchronous audio and visual telemedicine encounter.      Reason for Telemedicine Visit: Patient has requested telehealth visit, Patient unable to travel, Patient convenience (e.g. access to timely appointments / distance to available provider), Patient lives in a designated Health Professional Shortage Area (HPSA), and Services only offered telehealth    Originating Site (Patient Location): Patient's home    Distant Site (Provider Location): [facility]: [facility], [facility]    Consent:  The patient/guardian has verbally consented to: the potential risks and benefits of telemedicine (video visit) versus in person care; bill my insurance or make self-payment for services provided; and responsibility for payment of non-covered services.     Patient would like the video invitation sent by:  My Chart    Mode of Communication:  Video Conference via Medical Zoom    As the provider I attest to compliance with applicable laws and regulations related to telemedicine.        PATIENT'S NAME: Maddy Ortiz  MRN:   [id number]  :   1985  ACCT. NUMBER: [id number]  DATE OF SERVICE: 23  START TIME:  2:00 PM  END TIME:  2:50 PM  FACILITATOR: Nely Arthur PsyD  TOPIC:  Wellness Group: Health Maintenance  [facility] Adult Mental Health Day Treatment  TRACK: 6B    NUMBER OF PARTICIPANTS: 7    Summary of Group / Topics Discussed:  Health Maintenance: Wellness Check-in: Patients met with group facilitator to individually review a holistic wellness check-in to assess patient medication adherence/concerns, appointments, physical and mental health, exercise, nutrition, sleep, socialization, substance use, and need for service/resource referrals.       Patient Session Goals / Objectives:    Discussed various aspects of health  management and self-care related to physical and mental health    Demonstrated increased self-awareness of current wellness needs    Developed health literacy skills in navigating the healthcare system and self-advocacy/communicating needs with health care team        Patient Participation / Response:  Fully participated with the group by sharing personal reflections / insights and openly received / provided feedback with other participants.    Identified / Expressed personal readiness to practice skills  The client participated in an activity that discussed delusions and past strong thoughts, and feelings and the consequences of them. The group discussed skills to use and reality checks. The group discussed wellness and 8 areas of health and the environment for self-cares.  Treatment Plan:  Patient has a current master individualized treatment plan.  See Epic treatment plan for more information.    Kait Duke Psy., D,  Licensed Clinical Psychologist

## 2023-04-04 ENCOUNTER — HOSPITAL ENCOUNTER (OUTPATIENT)
Dept: BEHAVIORAL HEALTH | Facility: CLINIC | Age: 38
Discharge: HOME | End: 2023-04-04
Attending: PSYCHIATRY & NEUROLOGY
Payer: COMMERCIAL

## 2023-04-04 PROCEDURE — 90853 GROUP PSYCHOTHERAPY: CPT | Mod: 95 | Performed by: SOCIAL WORKER

## 2023-04-04 NOTE — GROUP NOTE
Psychoeducation Group Note    PATIENT'S NAME: Maddy Ortiz  MRN:   [id number]  :   1985  ACCT. NUMBER: [id number]  DATE OF SERVICE: 23  START TIME:  3:00 PM  END TIME:  3:50 PM  FACILITATOR: Demetria Cage LICSW; Johnny Maldonado, OTR/L  TOPIC: MH Life Skills Group: Resiliency Development  Service Modality:  Video Visit     Telemedicine Visit: The patient's condition can be safely assessed and treated via synchronous audio and visual telemedicine encounter.       Reason for Telemedicine Visit: Services only offered telehealth and due to COVID-19.     Originating Site (Patient Location): Patient's home     Distant Site (Provider Location): Provider Remote Setting- Home Office     Consent:  The patient/guardian has verbally consented to: the potential risks and benefits of telemedicine (video visit) versus in person care; bill my insurance or make self-payment for services provided; and responsibility for payment of non-covered services.      Patient would like the video invitation sent by:  My Chart     Mode of Communication:  Video Conference via Medical Zoom     As the provider I attest to compliance with applicable laws and regulations related to telemedicine.     [facility]  TRACK: 6B    NUMBER OF PARTICIPANTS: 7    Summary of Group / Topics Discussed:  Resiliency Development:  Coping Skills (Personal Risk Taking): Patients were taught how to identify stressors, signs of stress, coping skills, and prevention strategies for overall stress management.  Patients were given the opportunity to identify both ongoing and acute mental health symptoms and how to effectively manage these symptoms by developing an effective aftercare plan.  Patients increased awareness of community based resources.    Patient Session Goals / Objectives:    Identified how using coping skills can be used for symptom and stress management       Improved awareness of individualed symptoms and stressors  and how to effectively cope     Established a relapse prevention plan to practice these skills in their own environments    Practiced and reflected on how to generalize taught skills to their everyday life        Patient Participation / Response:  Fully participated with the group by sharing personal reflections / insights and openly received / provided feedback with other participants.    Verbalized understanding of content    Treatment Plan:  Patient has a current master individualized treatment plan.  See Epic treatment plan for more information.    Demetria Cage, NELLIESW

## 2023-04-06 DIAGNOSIS — E11.9 TYPE 2 DIABETES MELLITUS WITHOUT COMPLICATION, WITHOUT LONG-TERM CURRENT USE OF INSULIN (H): ICD-10-CM

## 2023-04-07 DIAGNOSIS — E11.9 TYPE 2 DIABETES MELLITUS WITHOUT COMPLICATION, WITHOUT LONG-TERM CURRENT USE OF INSULIN (H): ICD-10-CM

## 2023-04-07 RX ORDER — LIRAGLUTIDE 6 MG/ML
1.8 INJECTION SUBCUTANEOUS DAILY
Qty: 9 ML | Refills: 2 | Status: CANCELLED | OUTPATIENT
Start: 2023-04-07

## 2023-04-07 NOTE — TELEPHONE ENCOUNTER
Medication Question or Refill        What medication are you calling about (include dose and sig)?: VICTOZA PEN 18 MG/3ML SOLN    [facility]/pharmacy #[id number] - [place], [place] - [address]  [address] [address]  Phone: [phone] Fax: [phone]      Controlled Substance Agreement on file:   CSA -- Patient Level:    CSA: None found at the patient level.       Who prescribed the medication?: BRUNFELT    Do you need a refill? Yes    When did you use the medication last? NA    Patient offered an appointment? No    Do you have any questions or concerns?  No      Could we send this information to you in [facility] or would you prefer to receive a phone call?:   Patient would prefer a phone call   Okay to leave a detailed message?: Yes at Home number on file [phone] (home)

## 2023-04-07 NOTE — TELEPHONE ENCOUNTER
Patient is on Victoza and does not need refills.  She is on vacation and forgot to pack her needles. Rx was sent to [facility] [address] [facility]. Patient states it requires a prior auth.   Per [facility], the problem is that it is too soon to fill. Last Refill filled for 100 needles on 03/16/23. They cannot break up a box. It would be $48.99 for a box of 100. Notified patient, she is in the [facility] area. She thought she could get a new box anyway with a PA. Advised it would probably be less expensive to drive home to [place] to  her current supply. She has someone in the background stating they would pick them up for her.   Cassidy JOHNSON RN

## 2023-04-11 ENCOUNTER — HOSPITAL ENCOUNTER (OUTPATIENT)
Dept: BEHAVIORAL HEALTH | Facility: CLINIC | Age: 38
Discharge: HOME | End: 2023-04-11
Attending: PSYCHIATRY & NEUROLOGY
Payer: COMMERCIAL

## 2023-04-11 PROCEDURE — 90853 GROUP PSYCHOTHERAPY: CPT | Mod: GT | Performed by: SOCIAL WORKER

## 2023-04-11 PROCEDURE — 90853 GROUP PSYCHOTHERAPY: CPT | Mod: 95 | Performed by: PSYCHOLOGIST

## 2023-04-11 NOTE — GROUP NOTE
Psychoeducation Group Note    PATIENT'S NAME: Maddy Ortiz  MRN:   [id number]  :   1985  ACCT. NUMBER: [id number]  DATE OF SERVICE: 23  START TIME:  3:00 PM  END TIME:  3:50 PM  FACILITATOR: Demetria Cage LICSW; Michael Maldonado OT  TOPIC: MH Life Skills Group: Lifestyle Balance and Structure  Service Modality:  Video Visit     Telemedicine Visit: The patient's condition can be safely assessed and treated via synchronous audio and visual telemedicine encounter.       Reason for Telemedicine Visit: Services only offered telehealth and due to COVID-19.     Originating Site (Patient Location): Patient's home     Distant Site (Provider Location): Provider Remote Setting- Home Office     Consent:  The patient/guardian has verbally consented to: the potential risks and benefits of telemedicine (video visit) versus in person care; bill my insurance or make self-payment for services provided; and responsibility for payment of non-covered services.      Patient would like the video invitation sent by:  My Chart     Mode of Communication:  Video Conference via Medical Zoom     As the provider I attest to compliance with applicable laws and regulations related to telemedicine.    [facility] Adult Mental Health Day Treatment  TRACK: 6B    NUMBER OF PARTICIPANTS: 6    Summary of Group / Topics Discussed:  Lifestyle Balance and Strucure:  Occupations: A Balanced Lifestyle: Patients were introduced to the importance of daily occupations in support of mental health management by exploring a balanced lifestyle.  Patients identified how they spend their time and skills to bring this into better balance.  Patients were assisted to establish, restore, and/or modify performance skills and patterns for improved engagement and promotion of positive mental health through meaningful occupations.  Patients gained awareness of the connection between who they are (self identity) with what they do.    Patient Session  Goals / Objectives:    Increased awareness of how patient s functioning in identified meaningful occupations are impacted by their mental health status     Developed performance skills and performance patterns to enhance occupational engagement through creating a balanced lifestyle    Explored ways to generalize new awareness and skills to their everyday life      Patient Participation / Response:  Fully participated with the group by sharing personal reflections / insights and openly received / provided feedback with other participants.    Verbalized understanding of content    Treatment Plan:  Patient has a current master individualized treatment plan.  See Epic treatment plan for more information.    Demetria Cage, LICSW

## 2023-04-11 NOTE — GROUP NOTE
Psychotherapy Group Note                                    Service Modality:  Video Visit     Telemedicine Visit: The patient's condition can be safely assessed and treated via synchronous audio and visual telemedicine encounter.      Reason for Telemedicine Visit: Patient has requested telehealth visit, Patient unable to travel, Patient convenience (e.g. access to timely appointments / distance to available provider), Patient lives in a designated Health Professional Shortage Area (HPSA), and Services only offered telehealth    Originating Site (Patient Location): Patient's home    Distant Site (Provider Location): [facility]: [facility], [facility]    Consent:  The patient/guardian has verbally consented to: the potential risks and benefits of telemedicine (video visit) versus in person care; bill my insurance or make self-payment for services provided; and responsibility for payment of non-covered services.     Patient would like the video invitation sent by:  My Chart    Mode of Communication:  Video Conference via Medical Zoom    As the provider I attest to compliance with applicable laws and regulations related to telemedicine.        PATIENT'S NAME: Maddy Ortiz  MRN:   [id number]  :   1985  ACCT. NUMBER: [id number]  DATE OF SERVICE: 23  START TIME:  2:00 PM  END TIME:  2:50 PM  FACILITATOR: Nely Arthur PsyD  TOPIC:  EBP Group: Emotions Management  [facility] Adult Mental Health Day Treatment  TRACK: 6B    NUMBER OF PARTICIPANTS: 7    Summary of Group / Topics Discussed:  Emotions Management: Mood Tracking: Patients discussed and reviewed different resources to track one s mood, with a goal of identifying patterns and correlations between different factors and mood state.  Patients discussed ways to increase awareness of one s mood and how it may be impacted by environmental factors, diet, activity level, medication, etc. The group shared their experiences and thought  processes for feedback.      Patient Session Goals / Objectives:    Increase awareness of daily mood patterns/changes    Report out identified factors that impact their mood    Demonstrate understanding of how to use different resources to track mood, and effectively use these to help manage symptoms      Patient Participation / Response:  Fully participated with the group by sharing personal reflections / insights and openly received / provided feedback with other participants.    Demonstrated knowledge of when to consider applying a variety of emotions management skills in daily life       Treatment Plan:  Patient has a current master individualized treatment plan.  See Epic treatment plan for more information.    Kait Duke Psy., D,  Licensed Clinical Psychologist

## 2023-04-11 NOTE — GROUP NOTE
Process Group Note                                    Service Modality:  Video Visit     Telemedicine Visit: The patient's condition can be safely assessed and treated via synchronous audio and visual telemedicine encounter.      Reason for Telemedicine Visit: Patient has requested telehealth visit, Patient unable to travel, Patient convenience (e.g. access to timely appointments / distance to available provider), Patient lives in a designated Health Professional Shortage Area (HPSA), and Services only offered telehealth    Originating Site (Patient Location): Patient's home    Distant Site (Provider Location): [facility]: [facility], [facility]    Consent:  The patient/guardian has verbally consented to: the potential risks and benefits of telemedicine (video visit) versus in person care; bill my insurance or make self-payment for services provided; and responsibility for payment of non-covered services.     Patient would like the video invitation sent by:  My Chart    Mode of Communication:  Video Conference via Medical Zoom    As the provider I attest to compliance with applicable laws and regulations related to telemedicine.        PATIENT'S NAME: Maddy Ortiz  MRN:   [id number]  :   1985  ACCT. NUMBER: [id number]  DATE OF SERVICE: 23  START TIME:  1:00 PM  END TIME:  1:50 PM  FACILITATOR: Nely Arthur PsyD  TOPIC:  Process Group    Diagnoses:  295.70  (F25) Schizoaffective Disorder Bipolar Type     300.02 (F41.1) Generalized Anxiety Disorder     Patient reported history of diagnoses: ADHD; an anxiety disorder; a bipolar disorder; depression; an eating disorder; a personality disorder; PTSD; schizophrenia.    Psychiatry:  [facility]  therapist  [facility]  [facility]  [facility].   [facility] Worker with [facility]   with [facility]   [facility] worker twice per week,      [facility] Adult  "Mental Health Day Treatment  TRACK: 6B    NUMBER OF PARTICIPANTS: 7          Data:    Session content: At the start of this group, patients were invited to check in by identifying themselves, describing their current emotional status, and identifying issues to address in this group.   Area(s) of treatment focus addressed in this session included Symptom Management, Personal Safety and Community Resources/Discharge Planning.  Client reported being safe today.  Reported mood is \"angry and frustrated.\"    Goal for today is to attend group therapy and talk with others.  The client talked to the group about how she felt pressured by others, \"can't make plans, and likes predictability.\" She reported that she was trying to do relaxation, was mad at the downstairs apartment that slams their door and it scares her. She stated that she takes a PRN, but later feels more dysregulated from the PRN side effects. She stated that she has worked on shame and feels it has decreased. She reported that sometimes when anxious she feels confused but has been managing psychosis symptoms better lately. She stated that she feels more able to express herself and \"say what is bothering me.\"      Therapeutic Interventions/Treatment Strategies:  Psychotherapist reinforced use of skills. Treatment modalities used include Cognitive Behavioral Therapy and Dialectical Behavioral Therapy. Interventions include Coping Skills: Assisted patient in identifying 1-2 healthy distraction skills to reduce overall distress, Discussed how the use of intentional \"in the moment\" actions can help reduce distress and Promoted understanding of how and when to apply grounding strategies to reduce distress and increase presence in the moment, Relapse Prevention: Coached on skills to manage factors that contribute to relapse and Mindfulness: Facilitated discussion of when/how to use mindfulness skills to benefit general health, mental health symptoms, and " stressors.    Assessment:    Patient response:   Patient responded to session by giving feedback, listening and being attentive    Possible barriers to participation / learning include: severity of symptoms    Health Issues:   None reported       Substance Use Review:   Substance Use: No active concerns identified.    Mental Status/Behavioral Observations  Appearance:   Appropriate   Eye Contact:   Good   Psychomotor Behavior: Normal   Attitude:   Cooperative   Orientation:   All  Speech   Rate / Production: Normal    Volume:  Normal   Mood:    Anxious  Depressed   Affect:    Constricted   Thought Content:   Rumination and Psychosis reports preservative thoughts  Thought Form:  Coherent  Logical  Psychosis    Insight:    Good     Plan:     Safety Plan: Recommended that patient call 911 or go to the local ED should there be a change in any of these risk factors.     Barriers to treatment: None identified    Patient Contracts (see media tab):  None    Substance Use: Provided encouragement towards sobriety     Continue or Discharge: Patient will continue in Adult Day Treatment (ADT)  as planned. Patient is likely to benefit from learning and using skills as they work toward the goals identified in their treatment plan.      Nely Arthur PsyD  April 11, 2023  Dariusz Spaulding, D,  Licensed Clinical Psychologist

## 2023-04-13 ENCOUNTER — HOSPITAL ENCOUNTER (OUTPATIENT)
Dept: BEHAVIORAL HEALTH | Facility: CLINIC | Age: 38
Discharge: HOME | End: 2023-04-13
Attending: PSYCHIATRY & NEUROLOGY
Payer: COMMERCIAL

## 2023-04-13 PROCEDURE — 90853 GROUP PSYCHOTHERAPY: CPT | Mod: GT | Performed by: PSYCHOLOGIST

## 2023-04-13 PROCEDURE — 90853 GROUP PSYCHOTHERAPY: CPT | Mod: 95 | Performed by: SOCIAL WORKER

## 2023-04-13 NOTE — GROUP NOTE
Psychoeducation Group Note    PATIENT'S NAME: Maddy Ortiz  MRN:   [id number]  :   1985  ACCT. NUMBER: [id number]  DATE OF SERVICE: 23  START TIME:  3:00 PM  END TIME:  3:50 PM  FACILITATOR: Demetria Cage LICSW Benn[place], Len OT  TOPIC: MH Life Skills Group: Resiliency Development  Service Modality:  Video Visit     Telemedicine Visit: The patient's condition can be safely assessed and treated via synchronous audio and visual telemedicine encounter.       Reason for Telemedicine Visit: Services only offered telehealth and due to COVID-19.     Originating Site (Patient Location): Patient's home     Distant Site (Provider Location): Provider Remote Setting- Home Office     Consent:  The patient/guardian has verbally consented to: the potential risks and benefits of telemedicine (video visit) versus in person care; bill my insurance or make self-payment for services provided; and responsibility for payment of non-covered services.      Patient would like the video invitation sent by:  My Chart     Mode of Communication:  Video Conference via Medical Zoom     As the provider I attest to compliance with applicable laws and regulations related to telemedicine.    [facility] Adult Mental Health Day Treatment  TRACK: 6B    NUMBER OF PARTICIPANTS: 7    Summary of Group / Topics Discussed:  Resiliency Development:  Coping Skills (Conflict Resolution): Patients were taught how to identify stressors,  coping skills, and resolution for conflict within interpersonal relationships.   Patients were given the opportunity to identify both ongoing and acute intrapersonal conflicts using assertiveness techniques and the five conflict resolution styles.     Patient Session Goals / Objectives:    Identified how using coping skills can be used for conflict resolution     Improved awareness of individualed symptoms and stressors and how to effectively cope     Established a relapse prevention plan to practice  these skills in their own environments    Practiced and reflected on how to use communication skills.        Patient Participation / Response:  Fully participated with the group by sharing personal reflections / insights and openly received / provided feedback with other participants.    Verbalized understanding of content    Treatment Plan:  Patient has a current master individualized treatment plan.  See Epic treatment plan for more information.    Demetria Cage, LICSW

## 2023-04-13 NOTE — GROUP NOTE
"Process Group Note  Dariusz Spaulding D,  Licensed Clinical Psychologist    PATIENT'S NAME: Maddy Ortiz  MRN:   [id number]  :   1985  ACCT. NUMBER: [id number]  DATE OF SERVICE: 23  START TIME:  2:00 PM  END TIME:  2:50 PM  FACILITATOR: Nely Arthur PsyD  TOPIC:  Process Group    Diagnoses:  295.70  (F25) Schizoaffective Disorder Bipolar Type     300.02 (F41.1) Generalized Anxiety Disorder     Patient reported history of diagnoses: ADHD; an anxiety disorder; a bipolar disorder; depression; an eating disorder; a personality disorder; PTSD; schizophrenia.    Psychiatry:  [facility] at [facility]  therapist  [facility]  [place] Primary Care Provider  Shirley Freeman.   [facility] Worker with [facility]   with [facility]   \[facility] [place] [facility]\"" worker twice per week,    [facility] Adult Mental Health Day Treatment  TRACK: 6B    NUMBER OF PARTICIPANTS: 4          Data:    Session content: At the start of this group, patients were invited to check in by identifying themselves, describing their current emotional status, and identifying issues to address in this group.   Area(s) of treatment focus addressed in this session included Symptom Management, Personal Safety and Community Resources/Discharge Planning.  Client reported being safe today.  Reported mood is \"ok.\"    Goal for today is to attend group therapy and talk with others.  The client talked to the group about how she was distracting herself from anxiety, talking to her dad, stayed overnight with her daughter at her dad's, and focusing on supports. She reported some psychosis symptoms, and said that medications have helped to reduce symptoms.       Therapeutic Interventions/Treatment Strategies:  Psychotherapist reinforced use of skills. Treatment modalities used include Cognitive Behavioral Therapy and Dialectical Behavioral Therapy. Interventions include Coping Skills: Promoted understanding of how " and when to apply grounding strategies to reduce distress and increase presence in the moment, Relapse Prevention: Facilitated understanding of effective coping skills in response to triggers for substance use, Mindfulness: Facilitated discussion of when/how to use mindfulness skills to benefit general health, mental health symptoms, and stressors and Symptoms Management: Promoted understanding of their diagnoses and how it impacts their functioning.    Assessment:    Patient response:   Patient responded to session by giving feedback, focusing on goals and appearing alert    Possible barriers to participation / learning include: severity of symptoms    Health Issues:   None reported       Substance Use Review:   Substance Use: No active concerns identified.    Mental Status/Behavioral Observations  Appearance:   Appropriate   Eye Contact:   Good   Psychomotor Behavior: Normal   Attitude:   Cooperative   Orientation:   All  Speech   Rate / Production: Normal    Volume:  Normal   Mood:    Sad   Affect:    Constricted   Thought Content:   Rumination and Psychosis reports preservative thoughts  Thought Form:  Coherent  Logical     Insight:    Good     Plan:     Safety Plan: Recommended that patient call 911 or go to the local ED should there be a change in any of these risk factors.     Barriers to treatment: None identified    Patient Contracts (see media tab):  None    Substance Use: Provided encouragement towards sobriety     Continue or Discharge: Patient will continue in Adult Day Treatment (ADT)  as planned. Patient is likely to benefit from learning and using skills as they work toward the goals identified in their treatment plan.      Nely Arthur PsyD  April 13, 2023  Dariusz Spaulding D,  Licensed Clinical Psychologist

## 2023-04-13 NOTE — ADDENDUM NOTE
Encounter addended by: Johnny Maldonado, OTR/L on: 4/13/2023 4:29 PM   Actions taken: Clinical Note Signed

## 2023-04-13 NOTE — GROUP NOTE
Psychoeducation Group Note                                    Service Modality:  Video Visit     Telemedicine Visit: The patient's condition can be safely assessed and treated via synchronous audio and visual telemedicine encounter.      Reason for Telemedicine Visit: Patient has requested telehealth visit, Patient unable to travel, Patient convenience (e.g. access to timely appointments / distance to available provider), Patient lives in a designated Health Professional Shortage Area (HPSA), and Services only offered telehealth    Originating Site (Patient Location): Patient's home    Distant Site (Provider Location): [facility] Hospital: [facility], [facility]    Consent:  The patient/guardian has verbally consented to: the potential risks and benefits of telemedicine (video visit) versus in person care; bill my insurance or make self-payment for services provided; and responsibility for payment of non-covered services.     Patient would like the video invitation sent by:  My Chart    Mode of Communication:  Video Conference via Medical Zoom    As the provider I attest to compliance with applicable laws and regulations related to telemedicine.        PATIENT'S NAME: Maddy Ortiz  MRN:   [id number]  :   1985  ACCT. NUMBER: [id number]  DATE OF SERVICE: 23  START TIME:  2:00 PM  END TIME:  2:50 PM  FACILITATOR: Nely Arthur PsyD; Shirley Cooper RN  TOPIC:  Wellness Group: Health Maintenance  [facility] Adult Mental Health Day Treatment  TRACK: 6B    NUMBER OF PARTICIPANTS: 6    Summary of Group / Topics Discussed:  Health Maintenance: Wellness Check-in: Patients met with group facilitator to individually review a holistic wellness check-in to assess patient medication adherence/concerns, appointments, physical and mental health, exercise, nutrition, sleep, socialization, substance use, and need for service/resource referrals.       Patient Session Goals / Objectives:    Discussed  various aspects of health management and self-care related to physical and mental health    Demonstrated increased self-awareness of current wellness needs    Developed health literacy skills in navigating the healthcare system and self-advocacy/communicating needs with health care team        Patient Participation / Response:  Fully participated with the group by sharing personal reflections / insights and openly received / provided feedback with other participants.    The client participated in a discussion with the group about stigma and how they experienced it in their life  The group discussed how they felt around family, friends, and how they managed their symptoms. The group discussed mental illness and feeling alienated. Maddy talked about how she focused on positive relationships, what she has and appreciates.  Demonstrated understanding of topics discussed through group discussion and participation    Treatment Plan:  Patient has a current master individualized treatment plan.  See Epic treatment plan for more information.    Kait Duke Psy., DARNELL,  Licensed Clinical Psychologist

## 2023-04-13 NOTE — PROGRESS NOTES
Acknowledgement of Current Treatment Plan       I have reviewed my treatment plan(60 Day Review) with my therapist on 4/13/23.   I agree with the plan as it is written in the electronic health record. (6B)    Name:      Signature:  Maddy Ortiz Unable to sign due to virtual but agreed with plan     Dax Haji MD  Psychiatrist/Medical Director Dax Haji MD on 4/18/2023 at 8:36 AM   Nely Arthur PsyD,   Psychotherapist Dariusz Spaulding, DARNELL,  Licensed Clinical Psychologist     Michael Maldonado OR/WALLACE Solano/L on 4/13/2023 at 4:21 PM

## 2023-04-14 NOTE — ADDENDUM NOTE
Encounter addended by: Demetria Cage [facility] on: 4/14/2023 8:17 AM   Actions taken: Clinical Note Signed

## 2023-04-17 ENCOUNTER — HOSPITAL ENCOUNTER (OUTPATIENT)
Dept: BEHAVIORAL HEALTH | Facility: CLINIC | Age: 38
Discharge: HOME | End: 2023-04-17
Attending: PSYCHIATRY & NEUROLOGY
Payer: COMMERCIAL

## 2023-04-17 PROCEDURE — 90853 GROUP PSYCHOTHERAPY: CPT | Mod: GT | Performed by: PSYCHOLOGIST

## 2023-04-17 NOTE — GROUP NOTE
Psychotherapy Group Note    PATIENT'S NAME: Maddy Ortiz  MRN:   [id number]  :   1985  ACCT. NUMBER: [id number]  DATE OF SERVICE: 23  START TIME:  3:00 PM  END TIME:  3:50 PM  FACILITATOR: Demetria Cage LICSW  TOPIC: MH EBP Group: Emotions Management  Service Modality:  Video Visit     Telemedicine Visit: The patient's condition can be safely assessed and treated via synchronous audio and visual telemedicine encounter.       Reason for Telemedicine Visit: Services only offered telehealth and due to COVID-19.     Originating Site (Patient Location): Patient's home     Distant Site (Provider Location): Provider Remote Setting- Home Office     Consent:  The patient/guardian has verbally consented to: the potential risks and benefits of telemedicine (video visit) versus in person care; bill my insurance or make self-payment for services provided; and responsibility for payment of non-covered services.      Patient would like the video invitation sent by:  My Chart     Mode of Communication:  Video Conference via Medical Zoom     As the provider I attest to compliance with applicable laws and regulations related to telemedicine.    [facility] Adult Mental Health Day Treatment  TRACK: 6B    NUMBER OF PARTICIPANTS: 6    Summary of Group / Topics Discussed:  Emotions Management: Anger: Patients explored and shared personal experiences associated with feelings of anger.  Group explored how these feelings develop, what they mean to each individual, and how to increase acceptance and usefulness of these feelings.  Discussed anger as a  secondary  emotion and reviewed ways to manage anger and challenge associated cognitive distortions. Group members worked to contextualize these concepts and promote healing.     Patient Session Goals / Objectives:    Discuss and review definitions and personal views/experiences with anger    Explore how feelings of anger impact functioning    Understand and practice  strategies to manage difficult emotions and move towards healing    Demonstrate understanding of the feelings of anger    Verbalize how these emotions have impacted their lives/functioning    Verbalize of knowledge gained and possible interventions to manage feelings      Patient Participation / Response:  Fully participated with the group by sharing personal reflections / insights and openly received / provided feedback with other participants.    Demonstrated understanding of topics discussed through group discussion and participation and Expressed understanding of the relevance / importance of emotions management skills at distressing times in life    Treatment Plan:  Patient has a current master individualized treatment plan.  See Epic treatment plan for more information.    Demetria Cage, LICSW

## 2023-04-17 NOTE — GROUP NOTE
Process Group Note                                    Service Modality:  Video Visit     Telemedicine Visit: The patient's condition can be safely assessed and treated via synchronous audio and visual telemedicine encounter.      Reason for Telemedicine Visit: Patient has requested telehealth visit, Patient unable to travel, Patient convenience (e.g. access to timely appointments / distance to available provider), Patient lives in a designated Health Professional Shortage Area (HPSA), and Services only offered telehealth    Originating Site (Patient Location): Patient's home    Distant Site (Provider Location): [facility] Hospital: [facility], [facility]    Consent:  The patient/guardian has verbally consented to: the potential risks and benefits of telemedicine (video visit) versus in person care; bill my insurance or make self-payment for services provided; and responsibility for payment of non-covered services.     Patient would like the video invitation sent by:  My Chart    Mode of Communication:  Video Conference via Medical Zoom    As the provider I attest to compliance with applicable laws and regulations related to telemedicine.        PATIENT'S NAME: aMddy Ortiz  MRN:   [id number]  :   1985  ACCT. NUMBER: [id number]  DATE OF SERVICE: 23  START TIME:  1:00 PM  END TIME:  1:50 PM  FACILITATOR: Nely Arthur PsyD  TOPIC:  Process Group    Diagnoses:  295.70  (F25) Schizoaffective Disorder Bipolar Type     300.02 (F41.1) Generalized Anxiety Disorder     Patient reported history of diagnoses: ADHD; an anxiety disorder; a bipolar disorder; depression; an eating disorder; a personality disorder; PTSD; schizophrenia.    Psychiatry:  [facility]irley at [facility]  therapist  [facility]  [place] Primary Care Provider  Shirley Freeman.   [facility] Worker with [facility]   with [facility]   [facility] worker twice per week,      [facility] Adult  "Mental Health Day Treatment  TRACK: 6B  NUMBER OF PARTICIPANTS: 6          Data:    Session content: At the start of this group, patients were invited to check in by identifying themselves, describing their current emotional status, and identifying issues to address in this group.   Area(s) of treatment focus addressed in this session included Symptom Management, Personal Safety and Community Resources/Discharge Planning.  Client reported being safe today.  Reported mood is \"good.\"     Goal for today is to attend group therapy online and talk with others. The client talked to the group about how she noticed her daughter was happy that she played a video game with her, so she will cancel the subscription to a video game she was doing and play Robox with her daughter. She reported that he daughter enjoys doing with her. She reported that she is feeling different feelings and in the past felt numb. She reported that she is allowing herself to feel pain, when before she became numb. She reported practicing mindfulness and grounding to stay present. She reported feeling happy about connecting with her daughter.      Therapeutic Interventions/Treatment Strategies:  Psychotherapist reinforced use of skills. Treatment modalities used include Cognitive Behavioral Therapy and Dialectical Behavioral Therapy. Interventions include Coping Skills: Assisted patient in understanding the purpose of planning / creating / participating / sharing in positive experiences, Relapse Prevention: Facilitated understanding of effective coping skills in response to triggers for substance use, Mindfulness: Facilitated discussion of when/how to use mindfulness skills to benefit general health, mental health symptoms, and stressors and Symptoms Management: Promoted understanding of their diagnoses and how it impacts their functioning.    Assessment:    Patient response:   Patient responded to session by focusing on goals, accepting support and " appearing alert    Possible barriers to participation / learning include: severity of symptoms    Health Issues:   None reported       Substance Use Review:   Substance Use: No active concerns identified.    Mental Status/Behavioral Observations  Appearance:   Appropriate   Eye Contact:   Good   Psychomotor Behavior: Normal   Attitude:   Cooperative   Orientation:   All  Speech   Rate / Production: Normal    Volume:  Normal   Mood:    Anxious   Affect:    Constricted   Thought Content:   Rumination and Psychosis reports paranoia  Thought Form:  Coherent  Logical     Insight:    Good     Plan:     Safety Plan: Recommended that patient call 911 or go to the local ED should there be a change in any of these risk factors.     Barriers to treatment: None identified    Patient Contracts (see media tab):  None    Substance Use: Provided encouragement towards sobriety     Continue or Discharge: Patient will continue in Adult Day Treatment (ADT)  as planned. Patient is likely to benefit from learning and using skills as they work toward the goals identified in their treatment plan.      Nely Arthur PsyD  April 17, 2023  Jacek Spaulding., D,  Licensed Clinical Psychologist

## 2023-04-17 NOTE — GROUP NOTE
Psychoeducation Group Note                                    Service Modality:  Video Visit     Telemedicine Visit: The patient's condition can be safely assessed and treated via synchronous audio and visual telemedicine encounter.      Reason for Telemedicine Visit: Patient has requested telehealth visit, Patient unable to travel, Patient convenience (e.g. access to timely appointments / distance to available provider), Patient lives in a designated Health Professional Shortage Area (HPSA), and Services only offered telehealth    Originating Site (Patient Location): Patient's home    Distant Site (Provider Location): [facility] Hospital: [facility], [facility]    Consent:  The patient/guardian has verbally consented to: the potential risks and benefits of telemedicine (video visit) versus in person care; bill my insurance or make self-payment for services provided; and responsibility for payment of non-covered services.     Patient would like the video invitation sent by:  My Chart    Mode of Communication:  Video Conference via Medical Zoom    As the provider I attest to compliance with applicable laws and regulations related to telemedicine.        PATIENT'S NAME: Maddy Ortiz  MRN:   [id number]  :   1985  ACCT. NUMBER: [id number]  DATE OF SERVICE: 23  START TIME:  2:00 PM  END TIME:  2:50 PM  FACILITATOR: Nely Arthur PsyD Jessica Hermann, RN  TOPIC:  Wellness Group: Health Maintenance  [facility] Adult Mental Health Day Treatment  TRACK: 6B    NUMBER OF PARTICIPANTS: 6    Summary of Group / Topics Discussed:  Health Maintenance: Goal Setting: Meaningful goals can bring a sense of direction and purpose in life.  They also highlight our most important values. Patients were assisted by instructor to identify short term goals to promote their mental health recovery and improve overall health and wellness. Patients were educated on SMART goal setting framework as a strategy to  increase outcomes and promote success.    Patient Session Goals / Objectives:  ? Explained the key concepts of SMART goal setting framework  ? Identified three goals successfully using SMART goal setting framework  ? Reviewed concept of balance in wellness as it pertains to goal setting        Patient Participation / Response:  Fully participated with the group by sharing personal reflections / insights and openly received / provided feedback with other participants.    Identified / Expressed personal readiness to practice skills    Treatment Plan:  Patient has a current master individualized treatment plan.  See Epic treatment plan for more information.    Nely Arthur PsyD   [email or web address]

## 2023-04-18 ENCOUNTER — HOSPITAL ENCOUNTER (OUTPATIENT)
Dept: BEHAVIORAL HEALTH | Facility: CLINIC | Age: 38
Discharge: HOME | End: 2023-04-18
Attending: PSYCHIATRY & NEUROLOGY
Payer: COMMERCIAL

## 2023-04-18 PROCEDURE — 90853 GROUP PSYCHOTHERAPY: CPT | Mod: 95 | Performed by: PSYCHOLOGIST

## 2023-04-18 PROCEDURE — 90853 GROUP PSYCHOTHERAPY: CPT | Mod: GT | Performed by: SOCIAL WORKER

## 2023-04-18 NOTE — GROUP NOTE
Psychoeducation Group Note    PATIENT'S NAME: Maddy Ortiz  MRN:   [id number]  :   1985  ACCT. NUMBER: [id number]  DATE OF SERVICE: 23  START TIME:  3:00 PM  END TIME:  3:50 PM  FACILITATOR: Demetria Cage LICSW; Johnny Maldonado, OTR/L  TOPIC: MH Life Skills Group: Resiliency Development  Service Modality:  Video Visit     Telemedicine Visit: The patient's condition can be safely assessed and treated via synchronous audio and visual telemedicine encounter.       Reason for Telemedicine Visit: Services only offered telehealth and due to COVID-19.     Originating Site (Patient Location): Patient's home     Distant Site (Provider Location): Provider Remote Setting- Home Office     Consent:  The patient/guardian has verbally consented to: the potential risks and benefits of telemedicine (video visit) versus in person care; bill my insurance or make self-payment for services provided; and responsibility for payment of non-covered services.      Patient would like the video invitation sent by:  My Chart     Mode of Communication:  Video Conference via Medical Zoom     As the provider I attest to compliance with applicable laws and regulations related to telemedicine.    [facility] Mental Health Day Treatment  TRACK: 6B    NUMBER OF PARTICIPANTS: 6    Summary of Group / Topics Discussed:  Resiliency Development:  Coping Skills (Coping with Psychosis using Art and Phone for Reality Checking): Patients were taught how to identify stressors, signs of stress, coping skills, and prevention strategies for overall stress management.  Patients were given the opportunity to identify both ongoing and acute mental health symptoms and how to effectively manage these symptoms by developing an effective aftercare plan.  Patients increased awareness of community based resources.    Patient Session Goals / Objectives:    Identified how using coping skills can be used for symptom and stress management        Improved awareness of individualed symptoms and stressors and how to effectively cope     Established a relapse prevention plan to practice these skills in their own environments    Practiced and reflected on how to generalize taught skills to their everyday life    Education through film from Living Well with Schizophrenia Series with Temi Mckeon.         Patient Participation / Response:  Fully participated with the group by sharing personal reflections / insights and openly received / provided feedback with other participants.    Verbalized understanding of content    Treatment Plan:  Patient has a current master individualized treatment plan.  See Epic treatment plan for more information.    Demetria Cage, LICSW

## 2023-04-18 NOTE — GROUP NOTE
Psychotherapy Group Note                                    Service Modality:  Video Visit     Telemedicine Visit: The patient's condition can be safely assessed and treated via synchronous audio and visual telemedicine encounter.      Reason for Telemedicine Visit: Patient has requested telehealth visit, Patient unable to travel, Patient convenience (e.g. access to timely appointments / distance to available provider), Patient lives in a designated Health Professional Shortage Area (HPSA), and Services only offered telehealth    Originating Site (Patient Location): Patient's home    Distant Site (Provider Location): [facility] Hospital: [facility], [facility]    Consent:  The patient/guardian has verbally consented to: the potential risks and benefits of telemedicine (video visit) versus in person care; bill my insurance or make self-payment for services provided; and responsibility for payment of non-covered services.     Patient would like the video invitation sent by:  My Chart    Mode of Communication:  Video Conference via Medical Zoom    As the provider I attest to compliance with applicable laws and regulations related to telemedicine.        PATIENT'S NAME: Maddy Ortiz  MRN:   [id number]  :   1985  ACCT. NUMBER: [id number]  DATE OF SERVICE: 23  START TIME:  2:00 PM  END TIME:  2:50 PM  FACILITATOR: Nely Arthur PsyD  TOPIC:  EBP Group: Self-Awareness  [facility] Adult Mental Health Day Treatment  TRACK: 6B    NUMBER OF PARTICIPANTS: 6    Summary of Group / Topics Discussed:  Self-Awareness: Self-Compassion: Patients received overview of key concepts in developing self-compassion. Patients discussed mindfulness, self-kindness, and finding common humanity. Patients identified their current approach to problems in their lives and learned skills for increasing self-compassion. Patients identified ways they can put self-compassion skills into practice and problem solve barriers to  application of skills.     Patient Session Goals / Objectives:    [place] components of self-compassion    Identify ways to practice self-compassion in daily life    Problem solve barriers to self-compassion practice      Patient Participation / Response:  Fully participated with the group by sharing personal reflections / insights and openly received / provided feedback with other participants.    Identified / Expressed readiness to act intentionally, increase self-compassion, promote personal growth  The group discussed self-compasion and self-esteem and how they differed. The group shared experiences where they felt stigmatized and how they felt it held them back. The group discussed difficult emotions and what to do with strong emotions.  Treatment Plan:  Patient has a current master individualized treatment plan.  See Epic treatment plan for more information.    Kait Duke Psy., D,  Licensed Clinical Psychologist

## 2023-04-18 NOTE — GROUP NOTE
Process Group Note    Service Modality:  Video Visit     Telemedicine Visit: The patient's condition can be safely assessed and treated via synchronous audio and visual telemedicine encounter.      Reason for Telemedicine Visit: Patient has requested telehealth visit, Patient unable to travel, Patient convenience (e.g. access to timely appointments / distance to available provider), Patient lives in a designated Health Professional Shortage Area (HPSA), and Services only offered telehealth    Originating Site (Patient Location): Patient's home    Distant Site (Provider Location): remote site at therapist's home    Consent:  The patient/guardian has verbally consented to: the potential risks and benefits of telemedicine (video visit) versus in person care; bill my insurance or make self-payment for services provided; and responsibility for payment of non-covered services.     Patient would like the video invitation sent by:  My Chart    Mode of Communication:  Video Conference via Medical Zoom    As the provider I attest to compliance with applicable laws and regulations related to telemedicine.     PATIENT'S NAME: Maddy Ortiz  MRN:   [id number]  :   1985  ACCT. NUMBER: [id number]  DATE OF SERVICE: 23  START TIME:  1:00 PM  END TIME:  1:50 PM  FACILITATOR: Nely Arthur PsyD  TOPIC:  Process Group    Diagnoses:  295.70  (F25) Schizoaffective Disorder Bipolar Type     300.02 (F41.1) Generalized Anxiety Disorder     Patient reported history of diagnoses: ADHD; an anxiety disorder; a bipolar disorder; depression; an eating disorder; a personality disorder; PTSD; schizophrenia.    Psychiatry:  [facility]irley at [facility]  therapist  [facility]  [place] Primary Care Provider  Shirley Freeman.   [facility] Worker with [facility]   with [facility]   [facility] worker twice per week,      [facility] Adult Mental Health Day Treatment  TRACK: 6B    NUMBER OF  PARTICIPANTS: 6          Data:    Session content: At the start of this group, patients were invited to check in by identifying themselves, describing their current emotional status, and identifying issues to address in this group.   Area(s) of treatment focus addressed in this session included Symptom Management, Personal Safety and Community Resources/Discharge Planning.  Client reported being safe today.  Reported mood is anxious.    Goal for today is to attend group therapy online.  The client talked to the group about how she decorated her apartment, bought a new fake plant, and put up a painting of jellyfish. She reported that her daughter liked that they changed the apartment decorations and furniture and got rid of things they weren't using. She reported that a housing inspection is coming soon and she feels better about her apartment. She stated that she has been doing doll crafts, and will do her own taxes.   She reported some psychosis and is trying to focus on her own work around the apartment and not on others.      Therapeutic Interventions/Treatment Strategies:  Psychotherapist reinforced use of skills. Treatment modalities used include Cognitive Behavioral Therapy and Dialectical Behavioral Therapy. Interventions include Coping Skills: Assisted patient in understanding the purpose of planning / creating / participating / sharing in positive experiences, Relapse Prevention: Facilitated understanding of effective coping skills in response to triggers for substance use, Mindfulness: Facilitated discussion of when/how to use mindfulness skills to benefit general health, mental health symptoms, and stressors and Symptoms Management: Promoted understanding of their diagnoses and how it impacts their functioning.    Assessment:    Patient response:   Patient responded to session by focusing on goals, being attentive and accepting support    Possible barriers to participation / learning include: severity of  symptoms    Health Issues:   None reported       Substance Use Review:   Substance Use: No active concerns identified.    Mental Status/Behavioral Observations  Appearance:   Appropriate   Eye Contact:   Good   Psychomotor Behavior: Normal   Attitude:   Cooperative   Orientation:   All  Speech   Rate / Production: Normal    Volume:  Normal   Mood:    Anxious   Affect:    Constricted   Thought Content:   Rumination and Psychosis reports preservative thoughts  Thought Form:  Coherent  Logical  Psychosis    Insight:    Good     Plan:     Safety Plan: Recommended that patient call 911 or go to the local ED should there be a change in any of these risk factors.     Barriers to treatment: None identified    Patient Contracts (see media tab):  None    Substance Use: Provided encouragement towards sobriety     Continue or Discharge: Patient will continue in Adult Day Treatment (ADT)  as planned. Patient is likely to benefit from learning and using skills as they work toward the goals identified in their treatment plan.      Nely Arthur PsyD  February 16, 2023  Dariusz Spaulding, DARNELL,  Licensed Clinical Psychologist       78
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
## 2023-04-20 ENCOUNTER — MYC MEDICAL ADVICE (OUTPATIENT)
Dept: FAMILY MEDICINE | Facility: CLINIC | Age: 38
End: 2023-04-20
Payer: COMMERCIAL

## 2023-04-20 ENCOUNTER — HOSPITAL ENCOUNTER (OUTPATIENT)
Dept: BEHAVIORAL HEALTH | Facility: CLINIC | Age: 38
Discharge: HOME | End: 2023-04-20
Attending: PSYCHIATRY & NEUROLOGY
Payer: COMMERCIAL

## 2023-04-20 DIAGNOSIS — E11.9 TYPE 2 DIABETES MELLITUS WITHOUT COMPLICATION, WITHOUT LONG-TERM CURRENT USE OF INSULIN (H): ICD-10-CM

## 2023-04-20 PROCEDURE — 90853 GROUP PSYCHOTHERAPY: CPT | Mod: GT | Performed by: SOCIAL WORKER

## 2023-04-20 PROCEDURE — 90853 GROUP PSYCHOTHERAPY: CPT | Mod: GT | Performed by: PSYCHOLOGIST

## 2023-04-20 RX ORDER — BLOOD SUGAR DIAGNOSTIC
STRIP MISCELLANEOUS
Qty: 600 STRIP | Refills: 3 | Status: SHIPPED | OUTPATIENT
Start: 2023-04-20
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
## 2023-04-20 NOTE — GROUP NOTE
Psychoeducation Group Note    PATIENT'S NAME: Maddy Ortiz  MRN:   [id number]  :   1985  ACCT. NUMBER: [id number]  DATE OF SERVICE: 23  START TIME:  3:00 PM  END TIME:  3:50 PM  FACILITATOR: Demetria Cage LICSW; Michael Maldonado OT  TOPIC: MH Life Skills Group: Resiliency Development  Service Modality:  Video Visit     Telemedicine Visit: The patient's condition can be safely assessed and treated via synchronous audio and visual telemedicine encounter.       Reason for Telemedicine Visit: Services only offered telehealth and due to COVID-19.     Originating Site (Patient Location): Patient's home     Distant Site (Provider Location): Provider Remote Setting- Home Office     Consent:  The patient/guardian has verbally consented to: the potential risks and benefits of telemedicine (video visit) versus in person care; bill my insurance or make self-payment for services provided; and responsibility for payment of non-covered services.      Patient would like the video invitation sent by:  My Chart     Mode of Communication:  Video Conference via Medical Zoom     As the provider I attest to compliance with applicable laws and regulations related to telemedicine.    [facility] Adult Mental Health Day Treatment  TRACK: 6B    NUMBER OF PARTICIPANTS: 6    Summary of Group / Topics Discussed:  Resiliency Development:  Coping Skills (Mental Health Check In & weekend planning): Patients were taught how to identify stressors, signs of stress, coping skills, and prevention strategies for overall stress management.  Patients were given the opportunity to identify both ongoing and acute mental health symptoms and how to effectively manage these symptoms by developing an effective aftercare plan.  Patients increased awareness of community based resources.    Patient Session Goals / Objectives:    Identified how using coping skills can be used for symptom and stress management       Improved awareness of  individualed symptoms and stressors and how to effectively cope     Established a relapse prevention plan to practice these skills in their own environments    Practiced and reflected on how to generalize taught skills to their everyday life        Patient Participation / Response:  Fully participated with the group by sharing personal reflections / insights and openly received / provided feedback with other participants.    Verbalized understanding of content    Treatment Plan:  Patient has a current master individualized treatment plan.  See Epic treatment plan for more information.    Demetria Cage, LICSW

## 2023-04-20 NOTE — TELEPHONE ENCOUNTER
See [employer] message, routing to PCP for review.    Mela Plasencia RN  [facility]

## 2023-04-20 NOTE — GROUP NOTE
Psychoeducation Group Note                                    Service Modality:  Video Visit     Telemedicine Visit: The patient's condition can be safely assessed and treated via synchronous audio and visual telemedicine encounter.      Reason for Telemedicine Visit: Patient has requested telehealth visit, Patient unable to travel, Patient convenience (e.g. access to timely appointments / distance to available provider), Patient lives in a designated Health Professional Shortage Area (HPSA), and Services only offered telehealth    Originating Site (Patient Location): Patient's home    Distant Site (Provider Location): [facility] Hospital: [facility], [facility]    Consent:  The patient/guardian has verbally consented to: the potential risks and benefits of telemedicine (video visit) versus in person care; bill my insurance or make self-payment for services provided; and responsibility for payment of non-covered services.     Patient would like the video invitation sent by:  My Chart    Mode of Communication:  Video Conference via Medical Zoom    As the provider I attest to compliance with applicable laws and regulations related to telemedicine.        PATIENT'S NAME: Maddy Ortiz  MRN:   [id number]  :   1985  ACCT. NUMBER: [id number]  DATE OF SERVICE: 23  START TIME:  2:00 PM  END TIME:  2:50 PM  FACILITATOR: Nely Arthur PsyD; Shirley Cooper RN  TOPIC:  Wellness Group: Mental Health Maintenance  [facility] Adult Mental Health Day Treatment  TRACK: 6B    NUMBER OF PARTICIPANTS: 6    Summary of Group / Topics Discussed:  Mental Health Maintenance:  Letting Go of Stress: Patients viewed 20 minute video which demonstrated simple proven techniques too quickly and effectively release stress.     Patient Session Goals / Objectives:  ? Patients discovered quick, easy and effective stress reduction techniques  ? Patients practiced techniques that were demonstrated on the video  ? Patients  identified one technique they will use to cope with symptoms        Patient Participation / Response:  Fully participated with the group by sharing personal reflections / insights and openly received / provided feedback with other participants.    Identified / Expressed personal readiness to practice skills    Treatment Plan:  Patient has a current master individualized treatment plan.  See Epic treatment plan for more information.    Kait Duke Psy., D,  Licensed Clinical Psychologist

## 2023-04-21 ENCOUNTER — HOSPITAL ENCOUNTER (EMERGENCY)
Facility: CLINIC | Age: 38
Discharge: HOME | End: 2023-04-21
Attending: EMERGENCY MEDICINE | Admitting: EMERGENCY MEDICINE
Payer: COMMERCIAL

## 2023-04-21 VITALS
HEART RATE: 79 BPM | SYSTOLIC BLOOD PRESSURE: 123 MMHG | WEIGHT: 220 LBS | DIASTOLIC BLOOD PRESSURE: 78 MMHG | RESPIRATION RATE: 16 BRPM | OXYGEN SATURATION: 96 % | BODY MASS INDEX: 38.97 KG/M2 | TEMPERATURE: 97.6 F

## 2023-04-21 DIAGNOSIS — K42.9 UMBILICAL HERNIA WITHOUT OBSTRUCTION AND WITHOUT GANGRENE: ICD-10-CM

## 2023-04-21 PROCEDURE — 99283 EMERGENCY DEPT VISIT LOW MDM: CPT | Performed by: EMERGENCY MEDICINE

## 2023-04-21 RX ORDER — RISPERIDONE 2 MG/1
2 TABLET ORAL AT BEDTIME
Qty: 30 TABLET | Refills: 0 | Status: SHIPPED | OUTPATIENT
Start: 2023-04-21 | End: 2023-05-19

## 2023-04-21 ASSESSMENT — ACTIVITIES OF DAILY LIVING (ADL): ADLS_ACUITY_SCORE: 33

## 2023-04-21 NOTE — DISCHARGE INSTRUCTIONS
Follow-up with surgery clinic to discuss treatment options for your umbilical hernia.  They will contact you to set up an appointment.    Risperdal prescription has been sent to [facility] in [employer] in [facility].    Use abdominal binder when up and walking around to help decrease extrusion of hernia.    Return to the emergency department for any problems.

## 2023-04-21 NOTE — ED TRIAGE NOTES
Pt has umbilical hernia.  Complains of pain in the area.  Worse today when she was walking.  Denies n/v/d.      Triage Assessment     Row Name 04/21/23 0921       Triage Assessment (Adult)    Airway WDL WDL       Respiratory WDL    Respiratory WDL WDL       Skin Circulation/Temperature WDL    Skin Circulation/Temperature WDL WDL       Cardiac WDL    Cardiac WDL WDL       Peripheral/Neurovascular WDL    Peripheral Neurovascular WDL WDL       Cognitive/Neuro/Behavioral WDL    Cognitive/Neuro/Behavioral WDL WDL

## 2023-04-24 ENCOUNTER — TELEPHONE (OUTPATIENT)
Dept: FAMILY MEDICINE | Facility: CLINIC | Age: 38
End: 2023-04-24
Payer: COMMERCIAL

## 2023-04-24 ENCOUNTER — HOSPITAL ENCOUNTER (OUTPATIENT)
Dept: BEHAVIORAL HEALTH | Facility: CLINIC | Age: 38
Discharge: HOME | End: 2023-04-24
Attending: PSYCHIATRY & NEUROLOGY
Payer: COMMERCIAL

## 2023-04-24 DIAGNOSIS — E11.9 TYPE 2 DIABETES MELLITUS WITHOUT COMPLICATION, WITHOUT LONG-TERM CURRENT USE OF INSULIN (H): Primary | ICD-10-CM

## 2023-04-24 PROCEDURE — 90853 GROUP PSYCHOTHERAPY: CPT | Mod: GT | Performed by: SOCIAL WORKER

## 2023-04-24 PROCEDURE — 90853 GROUP PSYCHOTHERAPY: CPT | Mod: GT | Performed by: PSYCHOLOGIST

## 2023-04-24 NOTE — TELEPHONE ENCOUNTER
Patient is due for a diabetes follow up appointment with me.      Fasting labs due:  Lipid panel, A1C, Metabolic Panel and Microalbumin    Orders were placed, please have lab work done before visit.      Please ask patient to bring in their glucometer so we can download the data.    Please call patient to schedule.  Shirley Freeman, CNP

## 2023-04-24 NOTE — GROUP NOTE
Psychoeducation Group Note                                    Service Modality:  Video Visit     Telemedicine Visit: The patient's condition can be safely assessed and treated via synchronous audio and visual telemedicine encounter.      Reason for Telemedicine Visit: Patient has requested telehealth visit, Patient unable to travel, Patient convenience (e.g. access to timely appointments / distance to available provider), Patient lives in a designated Health Professional Shortage Area (HPSA), and Services only offered telehealth    Originating Site (Patient Location): Patient's home    Distant Site (Provider Location): [facility]: [facility], [facility]    Consent:  The patient/guardian has verbally consented to: the potential risks and benefits of telemedicine (video visit) versus in person care; bill my insurance or make self-payment for services provided; and responsibility for payment of non-covered services.     Patient would like the video invitation sent by:  My Chart    Mode of Communication:  Video Conference via Medical Zoom    As the provider I attest to compliance with applicable laws and regulations related to telemedicine.        PATIENT'S NAME: Maddy Ortiz  MRN:   [id number]  :   1985  ACCT. NUMBER: [id number]  DATE OF SERVICE: 23  START TIME:  2:00 PM  END TIME:  2:50 PM  FACILITATOR: Nely Arthur PsyD; Shirley Cooper RN  TOPIC:  Wellness Group: Health Maintenance  [facility] Adult Mental Health Day Treatment  TRACK: 6B    NUMBER OF PARTICIPANTS: 6    Summary of Group / Topics Discussed:  Health Maintenance: Goal Setting: Meaningful goals can bring a sense of direction and purpose in life.  They also highlight our most important values. Patients were assisted by instructor to identify short term goals to promote their mental health recovery and improve overall health and wellness. Patients were educated on SMART goal setting framework as a strategy to  increase outcomes and promote success.    Patient Session Goals / Objectives:  ? Explained the key concepts of SMART goal setting framework  ? Identified three goals successfully using SMART goal setting framework  ? Reviewed concept of balance in wellness as it pertains to goal setting        Patient Participation / Response:  Fully participated with the group by sharing personal reflections / insights and openly received / provided feedback with other participants.    Demonstrated understanding of topics discussed through group discussion and participation  The group identified positive things that they did lately and how they celebrated or would like to celebrate. They shared ideas with each other.Treatment Plan:  Patient has a current master individualized treatment plan.  See Epic treatment plan for more information.    Kait Duke Psy., D,  Licensed Clinical Psychologist

## 2023-04-24 NOTE — GROUP NOTE
Psychoeducation Group Note    PATIENT'S NAME: Maddy Ortiz  MRN:   [id number]  :   1985  ACCT. NUMBER: [id number]  DATE OF SERVICE: 23  START TIME:  3:00 PM  END TIME:  3:50 PM  FACILITATOR: Demetria Cage LICSW  TOPIC: MH Life Skills Group: Lifestyle Balance and Structure  Service Modality:  Video Visit     Telemedicine Visit: The patient's condition can be safely assessed and treated via synchronous audio and visual telemedicine encounter.       Reason for Telemedicine Visit: Services only offered telehealth and due to COVID-19.     Originating Site (Patient Location): Patient's home     Distant Site (Provider Location): Provider Remote Setting- Home Office     Consent:  The patient/guardian has verbally consented to: the potential risks and benefits of telemedicine (video visit) versus in person care; bill my insurance or make self-payment for services provided; and responsibility for payment of non-covered services.      Patient would like the video invitation sent by:  My Chart     Mode of Communication:  Video Conference via Medical Zoom     As the provider I attest to compliance with applicable laws and regulations related to telemedicine.    [facility] Adult Mental Health Day Treatment  TRACK: 6B    NUMBER OF PARTICIPANTS: 5    Summary of Group / Topics Discussed:  Lifestyle Balance and Strucure:  Routines, Habits, Rituals, and Roles: (Spiritual Wellness) Patients were assisted to identify meaningful roles that they want to promote and the impact their mental health symptoms have on this.  Patients learned, applied, and generalized skills needed to live and participate in meaningful roles as effectively and independently as possible.  Patients developed awareness of strengths and challenges in fulfilling these roles and worked on integrating specific and individualized rituals and habits into their daily life.     Patient Session Goals / Objectives:    Improved awareness and  engagement in life s meaningful roles, routines, habits, and rituals    Explored and identified current roles and challenges due to mental health symptoms     Identified ways to establish and integrate daily self care and wellness routines and habits to support mental health recovery    Practiced and reflected on how to generalize taught skills to their everyday life    Educated and identified traits with the SACRED SELF to build intrapersonal skills.      Patient Participation / Response:  Fully participated with the group by sharing personal reflections / insights and openly received / provided feedback with other participants.    Verbalized understanding of content    Treatment Plan:  Patient has a current master individualized treatment plan.  See Epic treatment plan for more information.    Demetria Cage, LICSW

## 2023-04-24 NOTE — TELEPHONE ENCOUNTER
Patient Quality Outreach    Patient is due for the following:   Diabetes -  A1C, LDL (Fasting), Microalbumin and Diabetic Follow-Up Visit    Next Steps:   Patient was scheduled for Lab and office visit     Type of outreach:    Phone, spoke to patient/parent. patient was scheduled.       Questions for provider review:    None     Eduardo Kingsley, CMA

## 2023-04-25 ENCOUNTER — TELEPHONE (OUTPATIENT)
Dept: FAMILY MEDICINE | Facility: CLINIC | Age: 38
End: 2023-04-25
Payer: COMMERCIAL

## 2023-04-25 ENCOUNTER — MYC MEDICAL ADVICE (OUTPATIENT)
Dept: FAMILY MEDICINE | Facility: CLINIC | Age: 38
End: 2023-04-25
Payer: COMMERCIAL

## 2023-04-25 ENCOUNTER — HOSPITAL ENCOUNTER (OUTPATIENT)
Dept: BEHAVIORAL HEALTH | Facility: CLINIC | Age: 38
Discharge: HOME | End: 2023-04-25
Attending: PSYCHIATRY & NEUROLOGY
Payer: COMMERCIAL

## 2023-04-25 DIAGNOSIS — F25.0 SCHIZOAFFECTIVE DISORDER, BIPOLAR TYPE (H): Primary | ICD-10-CM

## 2023-04-25 DIAGNOSIS — F22 PARANOID TYPE DELUSIONAL DISORDER (H): ICD-10-CM

## 2023-04-25 PROCEDURE — 90853 GROUP PSYCHOTHERAPY: CPT | Mod: 95 | Performed by: SOCIAL WORKER

## 2023-04-25 PROCEDURE — 90853 GROUP PSYCHOTHERAPY: CPT | Mod: 95 | Performed by: PSYCHOLOGIST

## 2023-04-25 ASSESSMENT — PATIENT HEALTH QUESTIONNAIRE - PHQ9
10. IF YOU CHECKED OFF ANY PROBLEMS, HOW DIFFICULT HAVE THESE PROBLEMS MADE IT FOR YOU TO DO YOUR WORK, TAKE CARE OF THINGS AT HOME, OR GET ALONG WITH OTHER PEOPLE: EXTREMELY DIFFICULT
SUM OF ALL RESPONSES TO PHQ QUESTIONS 1-9: 15
SUM OF ALL RESPONSES TO PHQ QUESTIONS 1-9: 15

## 2023-04-25 NOTE — TELEPHONE ENCOUNTER
Order/Referral Request    Who is requesting: patient    Orders being requested: psychiatric referral     Reason service is needed/diagnosis: for psych medication    When are orders needed by: Today    Has this been discussed with Provider: Yes discussed on [facility], no response    Does patient have a preference on a Group/Provider/Facility? Shirley Freeman    Does patient have an appointment scheduled?: No    Where to send orders: Place orders within Epic    [facility] or would you prefer to receive a phone call?:   Patient would prefer a phone call   Okay to leave a detailed message?: No at Cell number on file:    Telephone Information:   Mobile [phone]

## 2023-04-25 NOTE — GROUP NOTE
Psychotherapy Group Note                                    Service Modality:  Video Visit     Telemedicine Visit: The patient's condition can be safely assessed and treated via synchronous audio and visual telemedicine encounter.      Reason for Telemedicine Visit: Patient has requested telehealth visit, Patient unable to travel, Patient convenience (e.g. access to timely appointments / distance to available provider), Patient lives in a designated Health Professional Shortage Area (HPSA), and Services only offered telehealth    Originating Site (Patient Location): Patient's home    Distant Site (Provider Location): [facility]: [facility], [facility]    Consent:  The patient/guardian has verbally consented to: the potential risks and benefits of telemedicine (video visit) versus in person care; bill my insurance or make self-payment for services provided; and responsibility for payment of non-covered services.     Patient would like the video invitation sent by:  My Chart    Mode of Communication:  Video Conference via Medical Zoom    As the provider I attest to compliance with applicable laws and regulations related to telemedicine.        PATIENT'S NAME: Maddy Ortiz  MRN:   [id number]  :   1985  ACCT. NUMBER: [id number]  DATE OF SERVICE: 23  START TIME:  2:00 PM  END TIME:  2:50 PM  FACILITATOR: Nely Arthur PsyD  TOPIC:  EBP Group: Cognitive Restructuring  [facility] Adult Mental Health Day Treatment  TRACK: 6B    NUMBER OF PARTICIPANTS: 7    Summary of Group / Topics Discussed:  Cognitive Restructuring: Core Beliefs: Patients received an overview of what a core belief is, and how they develop. Patients then began to identify their negative core beliefs. Patients worked to modify core beliefs with the goal of improved self-image and functioning.     Patient Session Goals / Objectives:    Familiarize self with the concept of core beliefs and how they develop.      Explore  personal core beliefs (positive and negative)    Develop / advance recognition of the connection between negative thoughts and negative core beliefs.    Formulate new neutral/positive core beliefs               Patient Participation / Response:  Fully participated with the group by sharing personal reflections / insights and openly received / provided feedback with other participants.    Expressed understanding of the relationship between behaviors, thoughts, and feelings and Demonstrated knowledge of personal thought patterns and how they impact their mood and behavior.    The group participated in a discussion about negative thoughts, cognitive distortions and how they can appear with strong emotions. The group discussed how a strong emotion can have many other emotions under it and create negative thoughts that are not true.  Treatment Plan:  Patient has a current master individualized treatment plan.  See Epic treatment plan for more information.    Kait Duke Psy., D,  Licensed Clinical Psychologist

## 2023-04-25 NOTE — GROUP NOTE
"Process Group Note    PATIENT'S NAME: Maddy Ortiz  MRN:   [id number]  :   1985  ACCT. NUMBER: [id number]  DATE OF SERVICE: 23  START TIME:  1:00 PM  END TIME:  1:50 PM  FACILITATOR: Nely Arthur PsyD  TOPIC:  Process Group    Diagnoses:  ***    [facility] Health Day Treatment  TRACK: 6B    NUMBER OF PARTICIPANTS: 8          Data:    Session content: At the start of this group, patients were invited to check in by identifying themselves, describing their current emotional status, and identifying issues to address in this group.   Area(s) of treatment focus addressed in this session included {OP BEH ADULT  AREA OF TREATMENT FOCUS:827623}.  ***    Therapeutic Interventions/Treatment Strategies:  {OP  THERAPEUTIC INTERVENTIONS/TREATMENT:831905}    Assessment:    Patient response:   Patient responded to session by {PATIENT RESPONSE:484290}    Possible barriers to participation / learning include: {POSSIBLE BARRIERS:433392}    Health Issues:   {YES / NO:238070}       Substance Use Review:   Substance Use: {YES / NO:422356}    Mental Status/Behavioral Observations  Appearance:   {Appearance:329649::\"Appropriate \"}  Eye Contact:   {Eye Contact:318076::\"Good \"}  Psychomotor Behavior: {Psychomotor Behavior:698403::\"Normal \"}  Attitude:   {Attitude:739056::\"Cooperative \"}  Orientation:   {Orientation:262009::\"All\"}  Speech   Rate / Production: {Speech Rate/Production:324019::\"Normal \"}   Volume:  {Speech Volume:418686::\"Normal \"}  Mood:    {Mood:637562::\"Normal\"}  Affect:    {Affect:004425::\"Appropriate \"}  Thought Content:   {Thought Content with Safety:910810}  Thought Form:  {Thought Form:762988::\"Coherent \",\"Logical \"}    Insight:    {Insight:101857::\"Good \"}    Plan:     Safety Plan: {SAFETY PLAN:269351}     Barriers to treatment: {Barriers to Treatment:734883}    Patient Contracts (see media tab):  {Patient Contracts:604454}    Substance Use: {SUBSTANCE USE " ASSESSMENT/INTERVENTION:421864}     Continue or Discharge: {Continue or Discharge:409595}      Nely Arthur PsyD  April 25, 2023

## 2023-04-25 NOTE — GROUP NOTE
Psychoeducation Group Note    PATIENT'S NAME: Maddy Ortiz  MRN:   [id number]  :   1985  ACCT. NUMBER: [id number]  DATE OF SERVICE: 23  START TIME:  3:00 PM  END TIME:  3:50 PM  FACILITATOR: Demetria Cage LICSW  TOPIC: MH Life Skills Group: Resiliency Development  Service Modality:  Video Visit     Telemedicine Visit: The patient's condition can be safely assessed and treated via synchronous audio and visual telemedicine encounter.       Reason for Telemedicine Visit: Services only offered telehealth and due to COVID-19.     Originating Site (Patient Location): Patient's home     Distant Site (Provider Location): Provider Remote Setting- Home Office     Consent:  The patient/guardian has verbally consented to: the potential risks and benefits of telemedicine (video visit) versus in person care; bill my insurance or make self-payment for services provided; and responsibility for payment of non-covered services.      Patient would like the video invitation sent by:  My Chart     Mode of Communication:  Video Conference via Medical Zoom     As the provider I attest to compliance with applicable laws and regulations related to telemedicine.    [facility] Adult Mental Health Day Treatment  TRACK: 6B    NUMBER OF PARTICIPANTS: 7    Summary of Group / Topics Discussed:  Resiliency Development:  Coping Skills (Stress Management): Patients were taught how to identify stressors, signs of stress, coping skills, and prevention strategies for overall stress management.  Patients were given the opportunity to identify both ongoing and acute mental health symptoms and how to effectively manage these symptoms by developing an effective aftercare plan.  Patients increased awareness of community based resources.    Patient Session Goals / Objectives:    Identified how using coping skills can be used for symptom and stress management       Improved awareness of individualed symptoms and stressors and how to  effectively cope     Established a relapse prevention plan to practice these skills in their own environments    Practiced and reflected on how to generalize taught skills to their everyday life        Patient Participation / Response:  Fully participated with the group by sharing personal reflections / insights and openly received / provided feedback with other participants.    Verbalized understanding of content    Treatment Plan:  Patient has a current master individualized treatment plan.  See Epic treatment plan for more information.    Demetria Cage, LICSW
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
## 2023-04-25 NOTE — TELEPHONE ENCOUNTER
Shirley,    Patient sends attachments she wants scanned into her chart.  Is this ok? Nicci PONCE RN

## 2023-04-26 NOTE — TELEPHONE ENCOUNTER
I signed a referral  I read these documents - they do not need to be scanned into Epic.   Shirley Freeman, CNP

## 2023-04-26 NOTE — TELEPHONE ENCOUNTER
Shirley,    Patient is all over with her mental health needs.  Right now [facility]  Patient states she would like diagnostic testing not meds.  Please advise. Nicci PONCE RN

## 2023-04-27 ENCOUNTER — HOSPITAL ENCOUNTER (OUTPATIENT)
Dept: BEHAVIORAL HEALTH | Facility: CLINIC | Age: 38
Discharge: HOME | End: 2023-04-27
Attending: PSYCHIATRY & NEUROLOGY
Payer: COMMERCIAL

## 2023-04-27 PROCEDURE — 90853 GROUP PSYCHOTHERAPY: CPT | Mod: GT | Performed by: PSYCHOLOGIST

## 2023-04-27 ASSESSMENT — COLUMBIA-SUICIDE SEVERITY RATING SCALE - C-SSRS
2. HAVE YOU ACTUALLY HAD ANY THOUGHTS OF KILLING YOURSELF?: NO
TOTAL  NUMBER OF INTERRUPTED ATTEMPTS SINCE LAST CONTACT: NO
TOTAL  NUMBER OF ABORTED OR SELF INTERRUPTED ATTEMPTS SINCE LAST CONTACT: NO
SUICIDE, SINCE LAST CONTACT: NO
1. SINCE LAST CONTACT, HAVE YOU WISHED YOU WERE DEAD OR WISHED YOU COULD GO TO SLEEP AND NOT WAKE UP?: NO
6. HAVE YOU EVER DONE ANYTHING, STARTED TO DO ANYTHING, OR PREPARED TO DO ANYTHING TO END YOUR LIFE?: NO
ATTEMPT SINCE LAST CONTACT: NO

## 2023-04-27 NOTE — GROUP NOTE
Process Group Note                                    Service Modality:  Video Visit     Telemedicine Visit: The patient's condition can be safely assessed and treated via synchronous audio and visual telemedicine encounter.      Reason for Telemedicine Visit: Patient has requested telehealth visit, Patient unable to travel, Patient convenience (e.g. access to timely appointments / distance to available provider), Patient lives in a designated Health Professional Shortage Area (HPSA), and Services only offered telehealth    Originating Site (Patient Location): Patient's home    Distant Site (Provider Location): [facility] Hospital: [facility], [facility]    Consent:  The patient/guardian has verbally consented to: the potential risks and benefits of telemedicine (video visit) versus in person care; bill my insurance or make self-payment for services provided; and responsibility for payment of non-covered services.     Patient would like the video invitation sent by:  My Chart    Mode of Communication:  Video Conference via Medical Zoom    As the provider I attest to compliance with applicable laws and regulations related to telemedicine.        PATIENT'S NAME: Maddy Ortiz  MRN:   [id number]  :   1985  ACCT. NUMBER: [id number]  DATE OF SERVICE: 23  START TIME:  1:00 PM  END TIME:  1:50 PM  FACILITATOR: Nely Arthur PsyD  TOPIC:  Process Group    Diagnoses:  295.70  (F25) Schizoaffective Disorder Bipolar Type     300.02 (F41.1) Generalized Anxiety Disorder     Patient reported history of diagnoses: ADHD; an anxiety disorder; a bipolar disorder; depression; an eating disorder; a personality disorder; PTSD; schizophrenia.    Psychiatry:  Shirley at [facility]  therapist  [facility]  [place] [facility] Provider  Shirley Freeman.   [facility] Worker with [facility]   with [facility]   [facility] [place] worker twice per week,      [facility] Adult  Mental Health Day Treatment  TRACK: 6B    NUMBER OF PARTICIPANTS: 6          Data:    Session content: At the start of this group, patients were invited to check in by identifying themselves, describing their current emotional status, and identifying issues to address in this group.   Area(s) of treatment focus addressed in this session included Symptom Management, Personal Safety and Community Resources/Discharge Planning.  Client reported being safe today.  Reported mood is ok.  Goal for today is to attend group therapy online and talk with others.  The client talked to the group about how she will attend the [employer] and will help her daughter complete homework for school.  She reported that she did some cleaning and some artwork and showed the art to the group. She reported that it is enjoyable for her to do and helps her lower anxiety. She reported that she has talked to the school about tutors and will see if her daughter needs one. She talked about medications with the group and side effects from the past.      Therapeutic Interventions/Treatment Strategies:    Psychotherapist reinforced use of skills. Treatment modalities used include Cognitive Behavioral Therapy and Dialectical Behavioral Therapy. Interventions include Coping Skills: Promoted understanding of how and when to apply grounding strategies to reduce distress and increase presence in the moment, Relapse Prevention: Discussed relationships and encouragement for others, Mindfulness: Encouraged a plan to use mindfulness skills in daily life and Symptoms Management: Promoted understanding of their diagnoses and how it impacts their functioning.              Psychotherapist reinforced use of skills. Treatment modalities used include Cognitive Behavioral Therapy and Dialectical Behavioral Therapy. Interventions include Coping Skills: Promoted understanding of how and when to apply grounding strategies to reduce distress and increase  presence in the moment, Relapse Prevention: Discussed the use of substances and its impact on their relationships, Mindfulness: Facilitated discussion of when/how to use mindfulness skills to benefit general health, mental health symptoms, and stressors and Symptoms Management: Promoted understanding of their diagnoses and how it impacts their functioning.    Assessment:    Patient response:   Patient responded to session by listening, focusing on goals and being attentive    Possible barriers to participation / learning include: severity of symptoms    Health Issues:   None reported       Substance Use Review:   Substance Use: No active concerns identified.    Mental Status/Behavioral Observations  Appearance:   Appropriate   Eye Contact:   Good   Psychomotor Behavior: Normal   Attitude:   Cooperative   Orientation:   All  Speech   Rate / Production: Normal    Volume:  Normal   Mood:    Normal  Affect:    Appropriate   Thought Content:   Rumination  Thought Form:  Coherent  Logical     Insight:    Good     Plan:     Safety Plan: Recommended that patient call 911 or go to the local ED should there be a change in any of these risk factors.     Barriers to treatment: None identified    Patient Contracts (see media tab):  None    Substance Use: not an issue     Continue or Discharge: Patient is being discharged today. See Treatment Plan and Discharge Summary.       Nely Arthur PsyD  April 27, 2023  Dariusz Spaulding, DARNELL,  Licensed Clinical Psychologist

## 2023-04-27 NOTE — GROUP NOTE
Psychoeducation Group Note                                    Service Modality:  Video Visit     Telemedicine Visit: The patient's condition can be safely assessed and treated via synchronous audio and visual telemedicine encounter.      Reason for Telemedicine Visit: Patient has requested telehealth visit, Patient unable to travel, Patient convenience (e.g. access to timely appointments / distance to available provider), Patient lives in a designated Health Professional Shortage Area (HPSA), and Services only offered telehealth    Originating Site (Patient Location): Patient's home    Distant Site (Provider Location): [facility] Hospital: [facility], [facility]    Consent:  The patient/guardian has verbally consented to: the potential risks and benefits of telemedicine (video visit) versus in person care; bill my insurance or make self-payment for services provided; and responsibility for payment of non-covered services.     Patient would like the video invitation sent by:  My Chart    Mode of Communication:  Video Conference via Medical Zoom    As the provider I attest to compliance with applicable laws and regulations related to telemedicine.        PATIENT'S NAME: Maddy Ortiz  MRN:   [id number]  :   1985  ACCT. NUMBER: [id number]  DATE OF SERVICE: 23  START TIME:  2:00 PM  END TIME:  2:50 PM  FACILITATOR: Nely Arthur PsyD; Shirley Cooper RN  TOPIC:  Wellness Group: Mind/Body Practice & Complementary  [facility] Adult Mental Health Day Treatment  TRACK: 6B    NUMBER OF PARTICIPANTS: 6    Summary of Group / Topics Discussed:  Mind/Body Practice & Complementary Therapies:  Progressive Muscle Relaxation: In addition to affecting our mood and behavior, psychological stress can cause a myriad of physical symptoms in our body. Patients were educated on these effects and guided to increased self-awareness of how stress affects their body. The purpose, benefits, history, and techniques  of progressive muscle relaxation were discussed. In an instructor guided experiential, patients were guided to practice PMR to help reduce physical symptoms of psychological stress and achieve a more balanced feeling of well-being.    Patient Session Goals / Objectives:  ? Identified physiological symptoms of stress on the body  ? Listed & Explained the purpose and benefits to practicing PMR   ? Practiced progressive muscle relaxation experiential      Patient Participation / Response:  Fully participated with the group by sharing personal reflections / insights and openly received / provided feedback with other participants.    Identified / Expressed personal readiness to practice skills  The group participated in a guided meditation about Progressive Muscle Relaxation. The group discussed how it made them feel after completing it and what their body felt. The group discussed different ways to relax.   Treatment Plan:  Patient has a current master individualized treatment plan.  See Epic treatment plan for more information.    Kait Duke Psy., D,  Licensed Clinical Psychologist

## 2023-04-28 NOTE — TELEPHONE ENCOUNTER
Left non-detailed message for patient to return a call to the clinic RN.      Pt is active in [employer] account.    Message sent to pt.    Elvira Quinn RN

## 2023-05-01 ENCOUNTER — BEH TREATMENT PLAN (OUTPATIENT)
Dept: BEHAVIORAL HEALTH | Facility: CLINIC | Age: 38
End: 2023-05-01
Payer: COMMERCIAL

## 2023-05-08 ENCOUNTER — OFFICE VISIT (OUTPATIENT)
Dept: FAMILY MEDICINE | Facility: CLINIC | Age: 38
End: 2023-05-08
Payer: COMMERCIAL

## 2023-05-08 ENCOUNTER — LAB (OUTPATIENT)
Dept: LAB | Facility: CLINIC | Age: 38
End: 2023-05-08
Payer: COMMERCIAL

## 2023-05-08 VITALS
HEART RATE: 85 BPM | DIASTOLIC BLOOD PRESSURE: 86 MMHG | OXYGEN SATURATION: 97 % | RESPIRATION RATE: 20 BRPM | WEIGHT: 227.1 LBS | SYSTOLIC BLOOD PRESSURE: 122 MMHG | BODY MASS INDEX: 37.84 KG/M2 | HEIGHT: 65 IN | TEMPERATURE: 97.5 F

## 2023-05-08 DIAGNOSIS — E11.9 TYPE 2 DIABETES MELLITUS WITHOUT COMPLICATION, WITHOUT LONG-TERM CURRENT USE OF INSULIN (H): Primary | ICD-10-CM

## 2023-05-08 DIAGNOSIS — E11.9 TYPE 2 DIABETES MELLITUS WITHOUT COMPLICATION, WITHOUT LONG-TERM CURRENT USE OF INSULIN (H): ICD-10-CM

## 2023-05-08 DIAGNOSIS — R21 RASH: ICD-10-CM

## 2023-05-08 LAB
ANION GAP SERPL CALCULATED.3IONS-SCNC: 9 MMOL/L (ref 7–15)
BUN SERPL-MCNC: 14 MG/DL (ref 6–20)
CALCIUM SERPL-MCNC: 9.6 MG/DL (ref 8.6–10)
CHLORIDE SERPL-SCNC: 106 MMOL/L (ref 98–107)
CHOLEST SERPL-MCNC: 212 MG/DL
CREAT SERPL-MCNC: 0.71 MG/DL (ref 0.51–0.95)
CREAT UR-MCNC: 160.4 MG/DL
DEPRECATED HCO3 PLAS-SCNC: 25 MMOL/L (ref 22–29)
GFR SERPL CREATININE-BSD FRML MDRD: >90 ML/MIN/1.73M2
GLUCOSE SERPL-MCNC: 115 MG/DL (ref 70–99)
HBA1C MFR BLD: 5.1 % (ref 0–5.6)
HDLC SERPL-MCNC: 33 MG/DL
LDLC SERPL CALC-MCNC: 137 MG/DL
MICROALBUMIN UR-MCNC: 12.1 MG/L
MICROALBUMIN/CREAT UR: 7.54 MG/G CR (ref 0–25)
NONHDLC SERPL-MCNC: 179 MG/DL
POTASSIUM SERPL-SCNC: 4.1 MMOL/L (ref 3.4–5.3)
SODIUM SERPL-SCNC: 140 MMOL/L (ref 136–145)
TRIGL SERPL-MCNC: 212 MG/DL

## 2023-05-08 PROCEDURE — 82043 UR ALBUMIN QUANTITATIVE: CPT

## 2023-05-08 PROCEDURE — 36415 COLL VENOUS BLD VENIPUNCTURE: CPT

## 2023-05-08 PROCEDURE — 83036 HEMOGLOBIN GLYCOSYLATED A1C: CPT

## 2023-05-08 PROCEDURE — 80048 BASIC METABOLIC PNL TOTAL CA: CPT

## 2023-05-08 PROCEDURE — 80061 LIPID PANEL: CPT

## 2023-05-08 PROCEDURE — 82570 ASSAY OF URINE CREATININE: CPT

## 2023-05-08 PROCEDURE — 99214 OFFICE O/P EST MOD 30 MIN: CPT | Performed by: NURSE PRACTITIONER

## 2023-05-08 RX ORDER — RISPERIDONE 2 MG/1
2 TABLET ORAL AT BEDTIME
Qty: 30 TABLET | Refills: 0 | Status: CANCELLED | OUTPATIENT
Start: 2023-05-08

## 2023-05-08 RX ORDER — TRIAMCINOLONE ACETONIDE 1 MG/G
OINTMENT TOPICAL 2 TIMES DAILY
Qty: 30 G | Refills: 1 | Status: SHIPPED | OUTPATIENT
Start: 2023-05-08 | End: 2023-12-08

## 2023-05-08 ASSESSMENT — ANXIETY QUESTIONNAIRES
4. TROUBLE RELAXING: NEARLY EVERY DAY
GAD7 TOTAL SCORE: 20
1. FEELING NERVOUS, ANXIOUS, OR ON EDGE: NEARLY EVERY DAY
2. NOT BEING ABLE TO STOP OR CONTROL WORRYING: NEARLY EVERY DAY
GAD7 TOTAL SCORE: 20
8. IF YOU CHECKED OFF ANY PROBLEMS, HOW DIFFICULT HAVE THESE MADE IT FOR YOU TO DO YOUR WORK, TAKE CARE OF THINGS AT HOME, OR GET ALONG WITH OTHER PEOPLE?: EXTREMELY DIFFICULT
7. FEELING AFRAID AS IF SOMETHING AWFUL MIGHT HAPPEN: NEARLY EVERY DAY
5. BEING SO RESTLESS THAT IT IS HARD TO SIT STILL: MORE THAN HALF THE DAYS
7. FEELING AFRAID AS IF SOMETHING AWFUL MIGHT HAPPEN: NEARLY EVERY DAY
IF YOU CHECKED OFF ANY PROBLEMS ON THIS QUESTIONNAIRE, HOW DIFFICULT HAVE THESE PROBLEMS MADE IT FOR YOU TO DO YOUR WORK, TAKE CARE OF THINGS AT HOME, OR GET ALONG WITH OTHER PEOPLE: EXTREMELY DIFFICULT
3. WORRYING TOO MUCH ABOUT DIFFERENT THINGS: NEARLY EVERY DAY
6. BECOMING EASILY ANNOYED OR IRRITABLE: NEARLY EVERY DAY
GAD7 TOTAL SCORE: 20

## 2023-05-08 ASSESSMENT — PATIENT HEALTH QUESTIONNAIRE - PHQ9
SUM OF ALL RESPONSES TO PHQ QUESTIONS 1-9: 12
SUM OF ALL RESPONSES TO PHQ QUESTIONS 1-9: 12
10. IF YOU CHECKED OFF ANY PROBLEMS, HOW DIFFICULT HAVE THESE PROBLEMS MADE IT FOR YOU TO DO YOUR WORK, TAKE CARE OF THINGS AT HOME, OR GET ALONG WITH OTHER PEOPLE: EXTREMELY DIFFICULT

## 2023-05-08 ASSESSMENT — PAIN SCALES - GENERAL: PAINLEVEL: NO PAIN (0)

## 2023-05-08 NOTE — NURSING NOTE
Prior to immunization administration, verified patients identity using patient s name and date of birth. Please see Immunization Activity for additional information.     Screening Questionnaire for Adult Immunization    Are you sick today?   No   Do you have allergies to medications, food, a vaccine component or latex?   No   Have you ever had a serious reaction after receiving a vaccination?   No   Do you have a long-term health problem with heart, lung, kidney, or metabolic disease (e.g., diabetes), asthma, a blood disorder, no spleen, complement component deficiency, a cochlear implant, or a spinal fluid leak?  Are you on long-term aspirin therapy?   No   Do you have cancer, leukemia, HIV/AIDS, or any other immune system problem?   No   Do you have a parent, brother, or sister with an immune system problem?   No   In the past 3 months, have you taken medications that affect  your immune system, such as prednisone, other steroids, or anticancer drugs; drugs for the treatment of rheumatoid arthritis, Crohn s disease, or psoriasis; or have you had radiation treatments?   No   Have you had a seizure, or a brain or other nervous system problem?   No   During the past year, have you received a transfusion of blood or blood    products, or been given immune (gamma) globulin or antiviral drug?   No   For women: Are you pregnant or is there a chance you could become       pregnant during the next month?   No   Have you received any vaccinations in the past 4 weeks?   No     Immunization questionnaire answers were all negative.      Injection of Prevnar 20 given by Eduardo Kingsley CMA. Patient instructed to remain in clinic for 15 minutes afterwards, and to report any adverse reactions.     Screening performed by Eduardo Kingsley CMA on 5/8/2023 at 10:16 AM.

## 2023-05-08 NOTE — PROGRESS NOTES
"  Assessment & Plan     Type 2 diabetes mellitus without complication, without long-term current use of insulin (H)  Well controlled.  [employer] forms filled out and given to patient.  Continue Victoza.  Follow up in 6 months.      Rash  - triamcinolone (KENALOG) 0.1 % external ointment; Apply topically 2 times daily For up to 2 weeks      The risks, benefits and treatment options of prescribed medications or other treatments have been discussed with the patient. The patient verbalized their understanding and should call or follow up if no improvement or if they develop further problems.    PHILL Luo CNP  [facility]              Terry Castañeda is a 37 year old, presenting for the following health issues:          5/8/2023    10:06 AM   Additional Questions   Roomed by Eduardo MACIAS CMA   Accompanied by self     Imm/Inj         Rash  Onset/Duration: x 6 months   Description  Location: Left Hand - palm of hand   Character: flakey, red  Itching: moderate to severe   Intensity:  moderate  Progression of Symptoms:  worsening  Accompanying signs and symptoms:   Fever: No  Body aches or joint pain: No  Sore throat symptoms: No  Recent cold symptoms: No  History:           Previous episodes of similar rash: None  New exposures:  None  Recent travel: No  Exposure to similar rash: No  Precipitating or alleviating factors: Sugary foods that are wet   Therapies tried and outcome: hand lotion - \"burned\"     Diabetes Follow-up    How often are you checking your blood sugar? One time daily  What time of day are you checking your blood sugars (select all that apply)?  Before and after meals  Have you had any blood sugars above 200?  No  Have you had any blood sugars below 70?  No    What symptoms do you notice when your blood sugar is low?  None    What concerns do you have today about your diabetes? None     Do you have any of these symptoms? (Select all that apply)  No numbness or tingling in feet.  " "No redness, sores or blisters on feet.  No complaints of excessive thirst.  No reports of blurry vision.  No significant changes to weight.      BP Readings from Last 2 Encounters:   05/08/23 122/86   04/21/23 123/78     Hemoglobin A1C (%)   Date Value   05/08/2023 5.1   10/14/2022 5.4   06/09/2013 4.7     LDL Cholesterol Calculated (mg/dL)   Date Value   05/08/2023 137 (H)   03/04/2022 114 (H)               Review of Systems   Constitutional, HEENT, cardiovascular, pulmonary, gi and gu systems are negative, except as otherwise noted.      Objective    /86 (BP Location: Right arm, Patient Position: Chair, Cuff Size: Adult Large)   Pulse 85   Temp 97.5  F (36.4  C) (Tympanic)   Resp 20   Ht 1.651 m (5' 5\")   Wt 103 kg (227 lb 1.6 oz)   SpO2 97%   Breastfeeding No   BMI 37.79 kg/m    Body mass index is 37.79 kg/m .  Physical Exam   GENERAL: healthy, alert and no distress  RESP: lungs clear to auscultation - no rales, rhonchi or wheezes  CV: regular rate and rhythm, normal S1 S2, no S3 or S4, no murmur, click or rub, no peripheral edema and peripheral pulses strong  PSYCH: mentation appears normal and affect flat  SKIN: left hand - erythematous patch with scale on the palm of her hand    Results for orders placed or performed in visit on 05/08/23 (from the past 24 hour(s))   Hemoglobin A1c   Result Value Ref Range    Hemoglobin A1C 5.1 0.0 - 5.6 %   Lipid panel reflex to direct LDL Fasting   Result Value Ref Range    Cholesterol 212 (H) <200 mg/dL    Triglycerides 212 (H) <150 mg/dL    Direct Measure HDL 33 (L) >=50 mg/dL    LDL Cholesterol Calculated 137 (H) <=100 mg/dL    Non HDL Cholesterol 179 (H) <130 mg/dL    Narrative    Cholesterol  Desirable:  <200 mg/dL    Triglycerides  Normal:  Less than 150 mg/dL  Borderline High:  150-199 mg/dL  High:  200-499 mg/dL  Very High:  Greater than or equal to 500 mg/dL    Direct Measure HDL  Female:  Greater than or equal to 50 mg/dL   Male:  Greater than or equal " to 40 mg/dL    LDL Cholesterol  Desirable:  <100mg/dL  Above Desirable:  100-129 mg/dL   Borderline High:  130-159 mg/dL   High:  160-189 mg/dL   Very High:  >= 190 mg/dL    Non HDL Cholesterol  Desirable:  130 mg/dL  Above Desirable:  130-159 mg/dL  Borderline High:  160-189 mg/dL  High:  190-219 mg/dL  Very High:  Greater than or equal to 220 mg/dL   Basic metabolic panel  (Ca, Cl, CO2, Creat, Gluc, K, Na, BUN)   Result Value Ref Range    Sodium 140 136 - 145 mmol/L    Potassium 4.1 3.4 - 5.3 mmol/L    Chloride 106 98 - 107 mmol/L    Carbon Dioxide (CO2) 25 22 - 29 mmol/L    Anion Gap 9 7 - 15 mmol/L    Urea Nitrogen 14.0 6.0 - 20.0 mg/dL    Creatinine 0.71 0.51 - 0.95 mg/dL    Calcium 9.6 8.6 - 10.0 mg/dL    Glucose 115 (H) 70 - 99 mg/dL    GFR Estimate >90 >60 mL/min/1.73m2     *Note: Due to a large number of results and/or encounters for the requested time period, some results have not been displayed. A complete set of results can be found in Results Review.

## 2023-05-10 ENCOUNTER — OFFICE VISIT (OUTPATIENT)
Dept: SURGERY | Facility: CLINIC | Age: 38
End: 2023-05-10
Payer: COMMERCIAL

## 2023-05-10 ENCOUNTER — DOCUMENTATION ONLY (OUTPATIENT)
Dept: EMERGENCY MEDICINE | Facility: CLINIC | Age: 38
End: 2023-05-10

## 2023-05-10 VITALS
SYSTOLIC BLOOD PRESSURE: 132 MMHG | TEMPERATURE: 98.6 F | HEIGHT: 65 IN | BODY MASS INDEX: 37.45 KG/M2 | DIASTOLIC BLOOD PRESSURE: 87 MMHG | WEIGHT: 224.8 LBS | HEART RATE: 85 BPM

## 2023-05-10 DIAGNOSIS — E11.9 TYPE 2 DIABETES MELLITUS WITHOUT COMPLICATION, WITHOUT LONG-TERM CURRENT USE OF INSULIN (H): Primary | ICD-10-CM

## 2023-05-10 DIAGNOSIS — K42.9 UMBILICAL HERNIA WITHOUT OBSTRUCTION OR GANGRENE: Primary | ICD-10-CM

## 2023-05-10 DIAGNOSIS — F31.9 BIPOLAR 1 DISORDER (H): ICD-10-CM

## 2023-05-10 DIAGNOSIS — F25.0 SCHIZOAFFECTIVE DISORDER, BIPOLAR TYPE (H): ICD-10-CM

## 2023-05-10 DIAGNOSIS — K76.0 FATTY LIVER: ICD-10-CM

## 2023-05-10 DIAGNOSIS — K42.9 UMBILICAL HERNIA WITHOUT OBSTRUCTION AND WITHOUT GANGRENE: ICD-10-CM

## 2023-05-10 DIAGNOSIS — Z53.9 ERRONEOUS ENCOUNTER--DISREGARD: ICD-10-CM

## 2023-05-10 PROCEDURE — 99204 OFFICE O/P NEW MOD 45 MIN: CPT | Performed by: SURGERY

## 2023-05-10 NOTE — NURSING NOTE
"Initial /87 (BP Location: Right arm, Patient Position: Sitting, Cuff Size: Adult Large)   Pulse 85   Temp 98.6  F (37  C) (Tympanic)   Ht 1.651 m (5' 5\")   Wt 102 kg (224 lb 12.8 oz)   BMI 37.41 kg/m   Estimated body mass index is 37.41 kg/m  as calculated from the following:    Height as of this encounter: 1.651 m (5' 5\").    Weight as of this encounter: 102 kg (224 lb 12.8 oz). .    Cayla Davis MA    "

## 2023-05-10 NOTE — LETTER
"    5/10/2023         RE: Maddy Ortiz  [address]  [place] [address]        Dear Colleague,    Thank you for referring your patient, Maddy Ortiz, to the [facility]. Please see a copy of my visit note below.      Assessment & Plan   Problem List Items Addressed This Visit        Digestive    Fatty liver       Endocrine    Type 2 diabetes mellitus without complication, without long-term current use of insulin (H) - Primary       Behavioral    Bipolar 1 disorder (H)    Schizoaffective disorder, bipolar type (H)       Other    Umbilical hernia without obstruction and without gangrene      37-year-old female with partially incarcerated enlarging umbilical/ventral hernia.  Defect is very difficult to palpate.  However it is at least 4 or 5 cm based on the CT.  -Patient is currently at BMI of 37 weighing in at 224.  Patient is continuing to lose weight.  She has agreed to go down to 200 which would put her at a BMI of 35.  Is to call me when she gets to this weight so we can talk about her hernia repair and schedule it in the near future.  -We talked about when to go to the ED and get an emergent repair if hernia becomes strangulated.  -She spoke to a surgeon from another network in the past, she is ready for the surgery whether this be an inpatient or an outpatient aspect.  -All of her questions were answered.    Review of the result(s) of each unique test - multiple CTs  45 minutes spent by me on the date of the encounter doing chart review, patient visit, documentation and discussion with other provider(s)        BMI:   Estimated body mass index is 37.41 kg/m  as calculated from the following:    Height as of this encounter: 1.651 m (5' 5\").    Weight as of this encounter: 102 kg (224 lb 12.8 oz).   Weight management plan: Currently on Victoza and change her diet      No follow-ups on file.    Luis-Suzie Mathias MD  [facility]    Subjective   Maddy is a 37 " "year old, presenting for the following health issues:  Consult (Umbilical hernia)    History of ventral hernia at least 3-4 years  Hernia been increasing size; Thinks it is from exercise and weight gain  Does have pain at times; did went to the ED recently due to pain  Does \"go away\" in supine  Passing gas.  Having normal bowel movements.  But now losing weight - 30lbs the past year; pt started vicosar  Been getting mom's meal - more healthy  was lithium but now on risperidone for bipolar  Have seen the surgeon from [place] in the past for this hernia.  The time she did not have anyone to stay with her after surgery therefore did not go through with the surgery.  Not on a blood thinner.  Her diabetes is controlled.  Last hemoglobin A1c was 5.1  No abdominal surgery; multiple CTs been done.  This hernia has been there at least 3 to 4 years.  Is a small umbilical hernia that started in 2016.           Review of Systems   Constitutional, HEENT, cardiovascular, pulmonary, GI, , musculoskeletal, neuro, skin, endocrine and psych systems are negative, except as otherwise noted.     Objective    /87 (BP Location: Right arm, Patient Position: Sitting, Cuff Size: Adult Large)   Pulse 85   Temp 98.6  F (37  C) (Tympanic)   Ht 1.651 m (5' 5\")   Wt 102 kg (224 lb 12.8 oz)   BMI 37.41 kg/m    Body mass index is 37.41 kg/m .  Physical Exam  Vitals reviewed.   Eyes:      Conjunctiva/sclera: Conjunctivae normal.   Cardiovascular:      Pulses: Normal pulses.   Pulmonary:      Effort: Pulmonary effort is normal.   Abdominal:      Hernia: A hernia is present.       Musculoskeletal:      Cervical back: Normal range of motion.   Neurological:      Mental Status: She is alert.        .  There is a moderate to large fat-containing anterior abdominal wall hernia in the midline just above the umbilicus. This hernia does not contain any loops of bowel. Dimensions are detailed above. There is some mild stranding and edema associated " with this hernia suggesting some low-grade inflammation. Clinical correlation.     2.  Hepatosplenomegaly.     3.  Diffuse fatty infiltration of the liver.     4.  2.5 cm cystic collection in the left lower pelvis posteriorly. This is ultimately indeterminate but overall has a relatively benign appearance and could be seen with a small lymphocele. Follow-up CT in 3-6 months recommended for reevaluation to monitor for stability. Followup sooner if symptoms indicate.     5.  IUD within the uterus.  Narrative    For Patients: As a result of the 21st Century Cures Act, medical imaging exams and procedure reports are released immediately into your electronic medical record. You may view this report before your referring provider. If you have questions, please contact your health care provider.     EXAM: CT ABDOMEN PELVIS W   LOCATION: [facility]   DATE/TIME: 11/11/2022 10:16 AM     INDICATION: Umbilical hernia without obstruction and without gangrene.   COMPARISON: None.   TECHNIQUE: CT scan of the abdomen and pelvis was performed following injection of IV contrast. Multiplanar reformats were obtained. Dose reduction techniques were used.   CONTRAST: IOHEXOL 350 MG IODINE/ML  ML BOTTLE: 119 mL     FINDINGS:   LOWER CHEST: Mild atelectasis in the lung bases.     HEPATOBILIARY: Hepatomegaly with diffuse fatty infiltration. Low-attenuation subcentimeter liver lesion(s) compatible with benign cysts or other benign lesions. No calcified gallstones or biliary dilatation.     PANCREAS: Normal.     SPLEEN: Splenic enlargement with the spleen measuring up to 17 cm in length.     ADRENAL GLANDS: Normal.     KIDNEYS/BLADDER: Normal.     BOWEL: Normal.     LYMPH NODES: No adenopathy. There is a 2.5 cm cystic collection seen in the left lower pelvis posteriorly in the sciatic region (series 2 image 133). This is ultimately indeterminate but overall appearance favors a benign process. This could be seen with a small  lymphocele. Recommend follow-up to monitor for stability.     VASCULATURE: Unremarkable.     PELVIC ORGANS: IUD within the uterus.     MUSCULOSKELETAL: No significant bony abnormalities. There is a moderate to large fat-containing paraumbilical hernia present, just superior to the umbilicus. The hernia sac measures 7.6 x 5.9 cm in axial dimensions. The fascial defect at the hernia site measures 4.4 cm in transverse diameter. No bowel within this hernia. Slight associated stranding and edema about this hernia suggesting some mild inflammation.                 Again, thank you for allowing me to participate in the care of your patient.        Sincerely,        Shannon Mathias MD

## 2023-05-10 NOTE — PROGRESS NOTES
"  Assessment & Plan   Problem List Items Addressed This Visit        Digestive    Fatty liver       Endocrine    Type 2 diabetes mellitus without complication, without long-term current use of insulin (H) - Primary       Behavioral    Bipolar 1 disorder (H)    Schizoaffective disorder, bipolar type (H)       Other    Umbilical hernia without obstruction and without gangrene      37-year-old female with partially incarcerated enlarging umbilical/ventral hernia.  Defect is very difficult to palpate.  However it is at least 4 or 5 cm based on the CT.  -Patient is currently at BMI of 37 weighing in at 224.  Patient is continuing to lose weight.  She has agreed to go down to 200 which would put her at a BMI of 35.  Is to call me when she gets to this weight so we can talk about her hernia repair and schedule it in the near future.  -We talked about when to go to the ED and get an emergent repair if hernia becomes strangulated.  -She spoke to a surgeon from another network in the past, she is ready for the surgery whether this be an inpatient or an outpatient aspect.  -All of her questions were answered.    Review of the result(s) of each unique test - multiple CTs  45 minutes spent by me on the date of the encounter doing chart review, patient visit, documentation and discussion with other provider(s)        BMI:   Estimated body mass index is 37.41 kg/m  as calculated from the following:    Height as of this encounter: 1.651 m (5' 5\").    Weight as of this encounter: 102 kg (224 lb 12.8 oz).   Weight management plan: Currently on Victoza and change her diet      No follow-ups on file.    Luis-Suzie Mathias MD  [facility]    Terry Castañeda is a 37 year old, presenting for the following health issues:  Consult (Umbilical hernia)    History of ventral hernia at least 3-4 years  Hernia been increasing size; Thinks it is from exercise and weight gain  Does have pain at times; did went to the ED recently " "due to pain  Does \"go away\" in supine  Passing gas.  Having normal bowel movements.  But now losing weight - 30lbs the past year; pt started vicosar  Been getting mom's meal - more healthy  was lithium but now on risperidone for bipolar  Have seen the surgeon from [place] in the past for this hernia.  The time she did not have anyone to stay with her after surgery therefore did not go through with the surgery.  Not on a blood thinner.  Her diabetes is controlled.  Last hemoglobin A1c was 5.1  No abdominal surgery; multiple CTs been done.  This hernia has been there at least 3 to 4 years.  Is a small umbilical hernia that started in 2016.           Review of Systems   Constitutional, HEENT, cardiovascular, pulmonary, GI, , musculoskeletal, neuro, skin, endocrine and psych systems are negative, except as otherwise noted.      Objective    /87 (BP Location: Right arm, Patient Position: Sitting, Cuff Size: Adult Large)   Pulse 85   Temp 98.6  F (37  C) (Tympanic)   Ht 1.651 m (5' 5\")   Wt 102 kg (224 lb 12.8 oz)   BMI 37.41 kg/m    Body mass index is 37.41 kg/m .  Physical Exam  Vitals reviewed.   Eyes:      Conjunctiva/sclera: Conjunctivae normal.   Cardiovascular:      Pulses: Normal pulses.   Pulmonary:      Effort: Pulmonary effort is normal.   Abdominal:      Hernia: A hernia is present.       Musculoskeletal:      Cervical back: Normal range of motion.   Neurological:      Mental Status: She is alert.        .  There is a moderate to large fat-containing anterior abdominal wall hernia in the midline just above the umbilicus. This hernia does not contain any loops of bowel. Dimensions are detailed above. There is some mild stranding and edema associated with this hernia suggesting some low-grade inflammation. Clinical correlation.     2.  Hepatosplenomegaly.     3.  Diffuse fatty infiltration of the liver.     4.  2.5 cm cystic collection in the left lower pelvis posteriorly. This is ultimately " indeterminate but overall has a relatively benign appearance and could be seen with a small lymphocele. Follow-up CT in 3-6 months recommended for reevaluation to monitor for stability. Followup sooner if symptoms indicate.     5.  IUD within the uterus.  Narrative    For Patients: As a result of the 21st Century Cures Act, medical imaging exams and procedure reports are released immediately into your electronic medical record. You may view this report before your referring provider. If you have questions, please contact your health care provider.     EXAM: CT ABDOMEN PELVIS W   LOCATION: [facility]   DATE/TIME: 11/11/2022 10:16 AM     INDICATION: Umbilical hernia without obstruction and without gangrene.   COMPARISON: None.   TECHNIQUE: CT scan of the abdomen and pelvis was performed following injection of IV contrast. Multiplanar reformats were obtained. Dose reduction techniques were used.   CONTRAST: IOHEXOL 350 MG IODINE/ML  ML BOTTLE: 119 mL     FINDINGS:   LOWER CHEST: Mild atelectasis in the lung bases.     HEPATOBILIARY: Hepatomegaly with diffuse fatty infiltration. Low-attenuation subcentimeter liver lesion(s) compatible with benign cysts or other benign lesions. No calcified gallstones or biliary dilatation.     PANCREAS: Normal.     SPLEEN: Splenic enlargement with the spleen measuring up to 17 cm in length.     ADRENAL GLANDS: Normal.     KIDNEYS/BLADDER: Normal.     BOWEL: Normal.     LYMPH NODES: No adenopathy. There is a 2.5 cm cystic collection seen in the left lower pelvis posteriorly in the sciatic region (series 2 image 133). This is ultimately indeterminate but overall appearance favors a benign process. This could be seen with a small lymphocele. Recommend follow-up to monitor for stability.     VASCULATURE: Unremarkable.     PELVIC ORGANS: IUD within the uterus.     MUSCULOSKELETAL: No significant bony abnormalities. There is a moderate to large fat-containing paraumbilical hernia  present, just superior to the umbilicus. The hernia sac measures 7.6 x 5.9 cm in axial dimensions. The fascial defect at the hernia site measures 4.4 cm in transverse diameter. No bowel within this hernia. Slight associated stranding and edema about this hernia suggesting some mild inflammation.

## 2023-05-15 DIAGNOSIS — J30.1 SEASONAL ALLERGIC RHINITIS DUE TO POLLEN: ICD-10-CM

## 2023-05-16 RX ORDER — LORATADINE 10 MG/1
10 TABLET ORAL DAILY PRN
Qty: 30 TABLET | Refills: 4 | OUTPATIENT
Start: 2023-05-16

## 2023-05-16 NOTE — TELEPHONE ENCOUNTER
Too soon for refills. Last refill 4/20/2023 with 3 additional refills.     Callie VELASQUEZ RN  [facility]ty[facility]

## 2023-05-18 ENCOUNTER — NURSE TRIAGE (OUTPATIENT)
Dept: NURSING | Facility: CLINIC | Age: 38
End: 2023-05-18
Payer: COMMERCIAL

## 2023-05-18 DIAGNOSIS — F31.9 BIPOLAR 1 DISORDER (H): Primary | ICD-10-CM

## 2023-05-18 NOTE — TELEPHONE ENCOUNTER
Nurse Triage SBAR    Is this a 2nd Level Triage? NO    Situation:Pt requesting a refill for her Risperdal 2 mg tablets.    Background: Maddy informs me that her primary PCP is Shirley Freeman.    Assessment: It appears that she would be out of this medication. Refill sent to care team and preferred pharmacy confirmed.    Protocol Recommended Disposition:   Call PCP When Office is Open    Recommendation: Refill if appropriate.    Reason for Disposition    [1] Prescription refill request for NON-ESSENTIAL medicine (i.e., no harm to patient if med not taken) AND [2] triager unable to refill per department policy    Additional Information    Negative: [1] Prescription refill request for ESSENTIAL medicine (i.e., likelihood of harm to patient if not taken) AND [2] triager unable to refill per department policy    Negative: [1] Prescription not at pharmacy AND [2] was prescribed by PCP recently  (Exception: triager has access to EMR and prescription is recorded there. Go to Home Care and confirm for pharmacy.)    Negative: [1] Pharmacy calling with prescription questions AND [2] triager unable to answer question    Protocols used: MEDICATION REFILL AND RENEWAL CALL-ABNER-DARLENE Rob RN  [facility] Nurse Advisor   5/18/2023  5:06 PM

## 2023-05-19 RX ORDER — RISPERIDONE 2 MG/1
2 TABLET ORAL AT BEDTIME
Qty: 30 TABLET | Refills: 0 | Status: SHIPPED | OUTPATIENT
Start: 2023-05-19 | End: 2023-06-14

## 2023-05-19 NOTE — TELEPHONE ENCOUNTER
Brandi Barron/PCP:    Called patient and notified script has been filled. Patient says she is using oral tablet form of the Risperidone not the injectable. Patient has 2 future appointments for mental health and plans to have prescription filled by one of these providers. Patient has appointment on 5-23-23 at 11 am with Roxanne and has an appointment on 6-2-23 at 830 am with Joaquim as patient is trying to figure out who will manage her medications. This is being forwarded back to provider/Eva for update.      MARY ANNE Valdes

## 2023-05-19 NOTE — TELEPHONE ENCOUNTER
Covering for primary/ordering provider:  I signed this prescription, however I believe she is generally managed by psychiatry and that previously she has taken injectable risperidone. She was given tablets by an ER provider last month as she reported she couldn't get the injectable. RNs- Can we please call and verify that she is continuing to follow with a mental health provider, and that she is not currently also taking injectable risperidone?  Thanks,  Brandi Barron, CNP

## 2023-05-24 DIAGNOSIS — E11.9 TYPE 2 DIABETES MELLITUS WITHOUT COMPLICATION, WITHOUT LONG-TERM CURRENT USE OF INSULIN (H): ICD-10-CM

## 2023-05-25 RX ORDER — LIRAGLUTIDE 6 MG/ML
INJECTION SUBCUTANEOUS
Qty: 9 ML | Refills: 2 | Status: SHIPPED | OUTPATIENT
Start: 2023-05-25 | End: 2023-09-15

## 2023-06-02 ENCOUNTER — TRANSFERRED RECORDS (OUTPATIENT)
Dept: HEALTH INFORMATION MANAGEMENT | Facility: CLINIC | Age: 38
End: 2023-06-02
Payer: COMMERCIAL

## 2023-06-12 ENCOUNTER — TRANSFERRED RECORDS (OUTPATIENT)
Dept: HEALTH INFORMATION MANAGEMENT | Facility: CLINIC | Age: 38
End: 2023-06-12
Payer: COMMERCIAL

## 2023-06-13 ENCOUNTER — NURSE TRIAGE (OUTPATIENT)
Dept: NURSING | Facility: CLINIC | Age: 38
End: 2023-06-13
Payer: COMMERCIAL

## 2023-06-14 ENCOUNTER — VIRTUAL VISIT (OUTPATIENT)
Dept: FAMILY MEDICINE | Facility: CLINIC | Age: 38
End: 2023-06-14
Payer: COMMERCIAL

## 2023-06-14 DIAGNOSIS — F31.9 BIPOLAR 1 DISORDER (H): Primary | ICD-10-CM

## 2023-06-14 DIAGNOSIS — M25.511 ACUTE PAIN OF RIGHT SHOULDER: ICD-10-CM

## 2023-06-14 PROCEDURE — 99214 OFFICE O/P EST MOD 30 MIN: CPT | Mod: VID | Performed by: NURSE PRACTITIONER

## 2023-06-14 RX ORDER — RISPERIDONE 2 MG/1
2 TABLET ORAL AT BEDTIME
Qty: 30 TABLET | Refills: 5 | Status: SHIPPED | OUTPATIENT
Start: 2023-06-14 | End: 2023-07-30

## 2023-06-14 ASSESSMENT — ANXIETY QUESTIONNAIRES
4. TROUBLE RELAXING: NEARLY EVERY DAY
GAD7 TOTAL SCORE: 21
3. WORRYING TOO MUCH ABOUT DIFFERENT THINGS: NEARLY EVERY DAY
6. BECOMING EASILY ANNOYED OR IRRITABLE: NEARLY EVERY DAY
GAD7 TOTAL SCORE: 21
GAD7 TOTAL SCORE: 21
7. FEELING AFRAID AS IF SOMETHING AWFUL MIGHT HAPPEN: NEARLY EVERY DAY
7. FEELING AFRAID AS IF SOMETHING AWFUL MIGHT HAPPEN: NEARLY EVERY DAY
5. BEING SO RESTLESS THAT IT IS HARD TO SIT STILL: NEARLY EVERY DAY
IF YOU CHECKED OFF ANY PROBLEMS ON THIS QUESTIONNAIRE, HOW DIFFICULT HAVE THESE PROBLEMS MADE IT FOR YOU TO DO YOUR WORK, TAKE CARE OF THINGS AT HOME, OR GET ALONG WITH OTHER PEOPLE: VERY DIFFICULT
8. IF YOU CHECKED OFF ANY PROBLEMS, HOW DIFFICULT HAVE THESE MADE IT FOR YOU TO DO YOUR WORK, TAKE CARE OF THINGS AT HOME, OR GET ALONG WITH OTHER PEOPLE?: VERY DIFFICULT
1. FEELING NERVOUS, ANXIOUS, OR ON EDGE: NEARLY EVERY DAY
2. NOT BEING ABLE TO STOP OR CONTROL WORRYING: NEARLY EVERY DAY

## 2023-06-14 ASSESSMENT — PATIENT HEALTH QUESTIONNAIRE - PHQ9
10. IF YOU CHECKED OFF ANY PROBLEMS, HOW DIFFICULT HAVE THESE PROBLEMS MADE IT FOR YOU TO DO YOUR WORK, TAKE CARE OF THINGS AT HOME, OR GET ALONG WITH OTHER PEOPLE: SOMEWHAT DIFFICULT
SUM OF ALL RESPONSES TO PHQ QUESTIONS 1-9: 21
SUM OF ALL RESPONSES TO PHQ QUESTIONS 1-9: 21

## 2023-06-14 NOTE — PROGRESS NOTES
Maddy is a 37 year old who is being evaluated via a billable video visit.      How would you like to obtain your AVS? MyChart  If the video visit is dropped, the invitation should be resent by: Text to cell phone: [phone]  Will anyone else be joining your video visit? No          Assessment & Plan     Bipolar 1 disorder (H)  Refilled risperidone until patient finds a new psychiatrist.  She prefers to be seen by [facility] psychiatry - new referral signed.  - risperiDONE (RISPERDAL) 2 MG tablet; Take 1 tablet (2 mg) by mouth At Bedtime  - Adult Mental Health  Referral; Future    Acute pain of right shoulder  - Physical Therapy Referral; Future      The risks, benefits and treatment options of prescribed medications or other treatments have been discussed with the patient. The patient verbalized their understanding and should call or follow up if no improvement or if they develop further problems.    PHILL Luo CNP  [facility] [place]                Subjective   Mdady is a 37 year old, presenting for the following health issues:  Recheck Medication (Patient states she needs help with her psych medications/ refills and managing medications.) and Musculoskeletal Problem (Shoulder pain- see nurse triage note from last night.Maddy calling with right shoulder pain that is constant, states she is unable to rate it, she has always had it. States she has difficulty rating because her pain is also mental pain. Also reports right hand tingling and numbness/She is also having upper back pain that radiates down her right leg. Care advice is to be seen within 24 hours. She has an appt tomorrow with her PCP. She reports she also has an appt with her chiropractor tomorrow.)        6/14/2023    11:33 AM   Additional Questions   Roomed by Susanne PINEDA     History of Present Illness       Mental Health Follow-up:  Patient presents to follow-up on Depression & Anxiety (patient needs help with  "medication management.).Patient's depression since last visit has been:  Worse  The patient is having other symptoms associated with depression.  Patient's anxiety since last visit has been:  Worse  The patient is having other symptoms associated with anxiety.  Any significant life events: relationship concerns and health concerns  Patient is feeling anxious or having panic attacks.  Patient has no concerns about alcohol or drug use.    She eats 2-3 servings of fruits and vegetables daily.She consumes 2 sweetened beverage(s) daily.She exercises with enough effort to increase her heart rate 9 or less minutes per day.  She exercises with enough effort to increase her heart rate 3 or less days per week.   She is taking medications regularly.    Today's PHQ-9         PHQ-9 Total Score: 21    PHQ-9 Q9 Thoughts of better off dead/self-harm past 2 weeks :   More than half the days  Thoughts of suicide or self harm: (P) No  Self-harm Plan:     Self-harm Action:       Safety concerns for self or others: (P) No    How difficult have these problems made it for you to do your work, take care of things at home, or get along with other people: Somewhat difficult  Today's AYAKA-7 Score: 21     Wants a new psychiatrist.      Right shoulder pain:  Several weeks to months - she isn't sure  Not sure how it started.  Always in pain.  Difficulty with ROM  Chiropractor isn't helping  Asking for PT      Review of Systems   Constitutional, HEENT, cardiovascular, pulmonary, gi and gu systems are negative, except as otherwise noted.      Objective    Vitals - Patient Reported  Weight (Patient Reported): 101.6 kg (224 lb)  Height (Patient Reported): 165.1 cm (5' 5\")  BMI (Based on Pt Reported Ht/Wt): 37.28        Physical Exam   GENERAL: Healthy, alert and no distress  EYES: Eyes grossly normal to inspection.  No discharge or erythema, or obvious scleral/conjunctival abnormalities.  RESP: No audible wheeze, cough, or visible cyanosis.  No " visible retractions or increased work of breathing.    SKIN: Visible skin clear. No significant rash, abnormal pigmentation or lesions.  NEURO: Cranial nerves grossly intact.  Mentation and speech appropriate for age.  PSYCH: Mentation appears normal, affect normal/bright, judgement and insight intact, normal speech and appearance well-groomed.                Video-Visit Details    Type of service:  Video Visit   Video Start Time: 11:40 AM  Video End Time:11:51 AM    Originating Location (pt. Location): Home    Distant Location (provider location):  On-site  Platform used for Video Visit: Perri

## 2023-06-14 NOTE — TELEPHONE ENCOUNTER
"Maddy calling with right shoulder pain that is constant, states she is unable to rate it, she has always had it. States she has difficulty rating because her pain is also mental pain. Also reports right hand tingling and numbness  She is also having upper back pain that radiates down her right leg. Care advice is to be seen within 24 hours. She has an appt tomorrow with her PCP. She reports she also has an appt with her chiropractor tomorrow.  Agreed to try tylenol.  Erna Fitzgerald RN on 6/13/2023 at 9:45 PM      Reason for Disposition    Numbness (i.e., loss of sensation) in hand or fingers    Numbness in a leg or foot (i.e., loss of sensation)    Additional Information    Negative: Shock suspected (e.g., cold/pale/clammy skin, too weak to stand, low BP, rapid pulse)    Negative: [1] Similar pain previously AND [2] it was from \"heart attack\"    Negative: Passed out (i.e., lost consciousness, collapsed and was not responding)    Negative: [1] Similar pain previously AND [2] it was from \"angina\" AND [3] not relieved by nitroglycerin    Negative: Sounds like a life-threatening emergency to the triager    Negative: Difficulty breathing or unusual sweating (e.g., sweating without exertion)    Negative: [1] Pain lasting > 5 minutes AND [2] pain also present in chest  (Exception: pain is clearly made worse by movement)    Negative: [1] Age > 40 AND [2] no obvious cause AND [3] pain even when not moving the arm  (Exception: pain is clearly made worse by moving arm or bending neck)    Negative: [1] SEVERE pain AND [2] not improved 2 hours after pain medicine    Negative: [1] Red area or streak AND [2] fever    Negative: [1] Swollen joint AND [2] fever    Negative: Patient sounds very sick or weak to the triager    Negative: Entire arm is swollen    Negative: Weakness (i.e., loss of strength) in hand or fingers (Exception: not truly weak; hand feels weak because of pain)    Negative: [1] Shoulder pains with exertion (e.g., " "walking) AND [2] pain goes away on resting AND [3] not present now    Negative: [1] Painful rash AND [2] multiple small blisters grouped together (i.e., dermatomal distribution or \"band\" or \"stripe\")    Negative: Looks like a boil, infected sore, deep ulcer or other infected rash (spreading redness, pus)    Negative: [1] Localized rash is very painful AND [2] no fever    Negative: Passed out (i.e., lost consciousness, collapsed and was not responding)    Negative: Shock suspected (e.g., cold/pale/clammy skin, too weak to stand, low BP, rapid pulse)    Negative: Sounds like a life-threatening emergency to the triager    Negative: [1] SEVERE back pain (e.g., excruciating) AND [2] sudden onset AND [3] age > 60 years    Negative: [1] Unable to urinate (or only a few drops) > 4 hours AND [2] bladder feels very full (e.g., palpable bladder or strong urge to urinate)    Negative: [1] Loss of bladder or bowel control (urine or bowel incontinence; wetting self, leaking stool) AND [2] new-onset    Negative: Numbness in groin or rectal area (i.e., loss of sensation)    Negative: [1] SEVERE abdominal pain AND [2] present > 1 hour    Negative: [1] Abdominal pain AND [2] age > 60 years    Negative: Weakness of a leg or foot (e.g., unable to bear weight, dragging foot)    Negative: Unable to walk    Negative: Patient sounds very sick or weak to the triager    Negative: [1] SEVERE back pain (e.g., excruciating, unable to do any normal activities) AND [2] not improved 2 hours after pain medicine    Negative: [1] Pain radiates into the thigh or further down the leg AND [2] both legs    Negative: [1] Fever > 100.0 F (37.8 C) AND [2] flank pain (i.e., in side, below ribs and above hip)    Negative: [1] Pain or burning with passing urine (urination) AND [2] flank pain (i.e., in side, below ribs and above hip)    Protocols used: SHOULDER PAIN-A-AH, BACK PAIN-A-AH      "

## 2023-06-15 ENCOUNTER — TELEPHONE (OUTPATIENT)
Dept: FAMILY MEDICINE | Facility: CLINIC | Age: 38
End: 2023-06-15
Payer: COMMERCIAL

## 2023-06-15 NOTE — TELEPHONE ENCOUNTER
Reason for Call:  Other call back    Detailed comments: PT WAS SEEN YESTERDAY , PT IS WONDERING IF SHE SHOULD HAVE AN XRAY DONE ON HER SHOULDER AND WHERE SHOULD SHE HAVE THAT XRAY DONE AT     PLEASE CALL AND ADVISE PT     Phone Number Patient can be reached at: Cell number on file:    Telephone Information:   Mobile [phone]       Best Time: ANYTIME    Can we leave a detailed message on this number? YES    Call taken on 6/15/2023 at 9:16 AM by Tim Inman

## 2023-06-16 ENCOUNTER — NURSE TRIAGE (OUTPATIENT)
Dept: NURSING | Facility: CLINIC | Age: 38
End: 2023-06-16
Payer: COMMERCIAL

## 2023-06-16 ENCOUNTER — HOSPITAL ENCOUNTER (EMERGENCY)
Facility: CLINIC | Age: 38
Discharge: HOME | End: 2023-06-16
Attending: FAMILY MEDICINE | Admitting: FAMILY MEDICINE
Payer: COMMERCIAL

## 2023-06-16 VITALS — OXYGEN SATURATION: 95 % | SYSTOLIC BLOOD PRESSURE: 123 MMHG | HEART RATE: 95 BPM | DIASTOLIC BLOOD PRESSURE: 87 MMHG

## 2023-06-16 DIAGNOSIS — F48.9 MENTAL HEALTH PROBLEM: ICD-10-CM

## 2023-06-16 PROCEDURE — 99284 EMERGENCY DEPT VISIT MOD MDM: CPT | Performed by: FAMILY MEDICINE

## 2023-06-16 PROCEDURE — 99285 EMERGENCY DEPT VISIT HI MDM: CPT | Mod: 25 | Performed by: FAMILY MEDICINE

## 2023-06-16 PROCEDURE — 90791 PSYCH DIAGNOSTIC EVALUATION: CPT

## 2023-06-16 RX ORDER — RISPERIDONE 50 MG/2 ML
50 KIT INTRAMUSCULAR ONCE
Status: DISCONTINUED | OUTPATIENT
Start: 2023-06-16 | End: 2023-06-16

## 2023-06-16 ASSESSMENT — COLUMBIA-SUICIDE SEVERITY RATING SCALE - C-SSRS
4. HAVE YOU HAD THESE THOUGHTS AND HAD SOME INTENTION OF ACTING ON THEM?: YES
2. HAVE YOU ACTUALLY HAD ANY THOUGHTS OF KILLING YOURSELF?: YES
6. HAVE YOU EVER DONE ANYTHING, STARTED TO DO ANYTHING, OR PREPARED TO DO ANYTHING TO END YOUR LIFE?: NO
4. HAVE YOU HAD THESE THOUGHTS AND HAD SOME INTENTION OF ACTING ON THEM?: NO
REASONS FOR IDEATION LIFETIME: MOSTLY TO END OR STOP THE PAIN (YOU COULDN'T GO ON LIVING WITH THE PAIN OR HOW YOU WERE FEELING)
2. HAVE YOU ACTUALLY HAD ANY THOUGHTS OF KILLING YOURSELF?: NO
1. HAVE YOU WISHED YOU WERE DEAD OR WISHED YOU COULD GO TO SLEEP AND NOT WAKE UP?: YES
3. HAVE YOU BEEN THINKING ABOUT HOW YOU MIGHT KILL YOURSELF?: YES
1. IN THE PAST MONTH, HAVE YOU WISHED YOU WERE DEAD OR WISHED YOU COULD GO TO SLEEP AND NOT WAKE UP?: NO
5. HAVE YOU STARTED TO WORK OUT OR WORKED OUT THE DETAILS OF HOW TO KILL YOURSELF? DO YOU INTEND TO CARRY OUT THIS PLAN?: NO
ATTEMPT LIFETIME: NO
TOTAL  NUMBER OF INTERRUPTED ATTEMPTS LIFETIME: NO
TOTAL  NUMBER OF ABORTED OR SELF INTERRUPTED ATTEMPTS LIFETIME: NO

## 2023-06-16 ASSESSMENT — ACTIVITIES OF DAILY LIVING (ADL): ADLS_ACUITY_SCORE: 35

## 2023-06-16 NOTE — TELEPHONE ENCOUNTER
Patient notified that no x-ray needed and proceed with PT at this time, f/u as needed. Patient voices understanding.    Chiquis Roe RN  [facility]

## 2023-06-16 NOTE — TELEPHONE ENCOUNTER
See message below, pt was seen via virtual visit yesterday 6/14/23; PT referral was ordered. Routing to PCP for further review, proceed with ordering xray?    Mela Plasencia RN  [facility]

## 2023-06-16 NOTE — TELEPHONE ENCOUNTER
Patient calling. She thinks she is experiencing catatonic symptoms. She states that she is going in and out of catatonia. Sometimes she's more mindful, and sometimes she feels disassociated. States that she's feeling psychosomatic pain.     Patient states that she has taken all of her meds at approximately 12:30 this morning, which was late for her.     Patient states that she's in a lot of really bad pain.     Care advice given for patient to call 911 due to slurred speech. Patient is resistant to calling 911 because she feels that they may take her to [facility], who will send her to [facility], and she doesn't want to be hospitalized there.     Patient becomes more animated when she talks about how she doesn't want to go to [place]. She becomes angry with her ex-partner when discussing hospitalization at [place].     Care advice given for patient to call 911. Patient states that she will not go to [place]. Patient strongly encouraged to call 911 to be evaluated by the paramedics.     Patient continued to perseverate about her former partner, and what protocols are used to give care advice. Writer attempted to end the call several times, and the patient continued to state that she had another question. Writer ended the call after explaining for the fifth time that the care advice is for her to call 911, and that whether to do so was her decision to make.     Mela Latham RN  [facility]  June 16, 2023, 1:31 AM    Reason for Disposition    [1] Patient is very confused (disoriented, slurred speech) AND [2] no other adult (e.g., friend or family member) available    Additional Information    Negative: Patient attempted suicide    Negative: Patient is threatening suicide now    Negative: Violent behavior, or threatening to physically hurt or kill someone    Protocols used: DEPRESSION-A-AH

## 2023-06-16 NOTE — TELEPHONE ENCOUNTER
"Patient is calling to ask if previous RN used her personal opinion or \"computer\" to yield results of calling 911.  She also asked if she can call later or if she has to call now.    Advised patient to call now and to not delay calling 911.  Pt verbalized understanding.    Stefania Oneill, RN, BSN Nurse Triage Advisor 6/16/2023 2:15 AM   "

## 2023-06-17 NOTE — ED TRIAGE NOTES
"Pt here with \" delusions, psycho somatic pain and catotonia\" pt checked into urgent care. Pt denies being suicidal.     Triage Assessment     Row Name 06/16/23 1922       Triage Assessment (Adult)    Airway WDL WDL       Respiratory WDL    Respiratory WDL WDL       Skin Circulation/Temperature WDL    Skin Circulation/Temperature WDL WDL       Cardiac WDL    Cardiac WDL WDL       Peripheral/Neurovascular WDL    Peripheral Neurovascular WDL WDL       Cognitive/Neuro/Behavioral WDL    Cognitive/Neuro/Behavioral WDL WDL              "

## 2023-06-17 NOTE — CONSULTS
"Diagnostic Evaluation Consultation  Crisis Assessment    Patient was assessed: Perri  Patient location: [facility] ED  Was a release of information signed: yes     Referral Data and Chief Complaint  Maddy is a 37 year old, who uses she/her pronouns, and presents to the ED alone. Patient is referred to the ED by self. Patient is presenting to the ED for the following concerns: mental health concerns.    Informed Consent and Assessment Methods     Patient is her own guardian. Writer met with patient and explained the crisis assessment process, including applicable information disclosures and limits to confidentiality, assessed understanding of the process, and obtained consent to proceed with the assessment. Patient was observed to be able to participate in the assessment as evidenced by answering of questions. Assessment methods included conducting a formal interview with patient, review of medical records, collaboration with medical staff, and obtaining relevant collateral information from family and community providers when available..     Over the course of this crisis assessment provided reassurance, offered validation, engaged patient in problem solving and disposition planning and provided psychoeducation. Patient's response to interventions was open.     Summary of Patient Situation     Pt presents with report that she is experiencing \"psychosomatic pain\" and concern that she is mentally unstable.Pt describes she is \"thinking slow, talking slow, mind is blank\". Pt describes her mood to be low. Est MH team though states she is finding it hard to schedule with her providers - part due to her forgetfulness, and also in part due to their limited schedules.     Brief Psychosocial History     Mother to 15yo daughter, they live together in an apt. Not employed.     Significant Clinical History     Bipolar dx.   Est MH team consisting of individual therapist, family therapist, ARMHS, medication prescriber. F/U with " therapist next week. A referral has been placed to transfer her psychiatric med mgmt to [place].   Hx of IP admission in January - additional details unclear.        Collateral Information    Review of epic.      Risk Assessment  [place] Suicide Severity Rating Scale Full Clinical Version: 6/16/23  Suicidal Ideation  1. Wish to be Dead (Lifetime): Yes  1. Wish to be Dead (Past 1 Month): No  2. Non-Specific Active Suicidal Thoughts (Lifetime): Yes  2. Non-Specific Active Suicidal Thoughts (Past 1 Month): No  3. Active Suicidal Ideation with any Methods (Not Plan) Without Intent to Act (Lifetime): Yes  3. Active Suicidal Ideation with any Methods (Not Plan) Without Intent to Act (Past 1 Month): No  4. Active Suicidal Ideation with Some Intent to Act, Without Specific Plan (Lifetime): Yes  4. Active Suicidal Ideation with Some Intent to Act, Without Specific Plan (Past 1 Month): No  5. Active Suicidal Ideation with Specific Plan and Intent (Lifetime): No  Intensity of Ideation  Most Severe Ideation Rating (Lifetime): 2  Frequency (Lifetime): Once a week  Duration (Lifetime): Less than 1 hour/some of the time  Controllability (Lifetime): Can control thoughts with little difficulty  Deterrents (Lifetime): Deterrents probably stopped you  Reasons for Ideation (Lifetime): Mostly to end or stop the pain (You couldn't go on living with the pain or how you were feeling)  Suicidal Behavior  Actual Attempt (Lifetime): No  Has subject engaged in non-suicidal self-injurious behavior? (Lifetime): No  Interrupted Attempts (Lifetime): No  Aborted or Self-Interrupted Attempt (Lifetime): No  Preparatory Acts or Behavior (Lifetime): No  C-SSRS Risk (Lifetime/Recent)  Calculated C-SSRS Risk Score (Lifetime/Recent): Moderate Risk     Validity of evaluation is not impacted by presenting factors during interview.   Comments regarding subjective versus objective responses to [place] tool: none  Environmental or Psychosocial Events:  unemployment/underemployment  Chronic Risk Factors: history of psychiatric hospitalization and serious, persistent mental illness   Warning Signs: none identified  Protective Factors: strong bond to family unit, community support, or employment, responsibilities and duties to others, including pets and children, lives in a responsibly safe and stable environment, good treatment engagement, able to access care without barriers, supportive ongoing medical and mental health care relationships, help seeking, sense of belonging and constructive use of leisure time, enjoyable activities, resilience  Interpretation of Risk Scoring, Risk Mitigation Interventions and Safety Plan: low to moderate risk     Does the patient have thoughts of harming others? No     Is the patient engaging in sexually inappropriate behavior?  no        Current Substance Abuse     Is there recent substance abuse? no     Was a urine drug screen or blood alcohol level obtained: No       Mental Status Exam     Affect: Blunted   Appearance: Appropriate    Attention Span/Concentration: Attentive  Eye Contact: Avoidant   Fund of Knowledge: Appropriate    Language /Speech Content: Fluent   Language /Speech Volume: Normal    Language /Speech Rate/Productions: Normal    Recent Memory: Intact   Remote Memory: Intact   Mood: Depressed    Orientation to Person: Yes    Orientation to Place: Yes   Orientation to Time of Day: Yes    Orientation to Date: Yes    Situation (Do they understand why they are here?): Yes    Psychomotor Behavior: Normal    Thought Content: Clear   Thought Form: Intact      History of commitment: No       Medication    Psychotropic medications: yes  Medication changes made in the last two weeks: No       Current Care Team    Primary Care Provider: Shirley Freeman APRN CNP     Psychiatrist: No  Therapist: [facility]  : yes     [facility]SS or [facility]: yes  ACT Team: No  Other: No      Diagnosis    296.50 Bipolar I  Disorder Current or Most Recent Episode Depressed, unspecified     Clinical Summary and Substantiation of Recommendations    No acute safety concerns. No need for admission to IP MH unit. Pt req to get Risperidone injection though the shot is not available in the ED. Pt to follow up with her est care team.   Disposition    Recommended disposition: Individual Therapy, family therapy, med mgmt, in home therapy.      Reviewed case and recommendations with attending provider. Attending Name: Livan PELAYO       Attending concurs with disposition: Yes       Patient and/or validated legal guardian concurs with disposition: Yes       Final disposition: Individual therapy , Family therapy , Medication management and In home therapy .     Outpatient Details (if applicable):   Aftercare plan and appointments placed in the AVS and provided to patient: Yes. Given to patient by ED staff    Was lethal means counseling provided as a part of aftercare planning? No;       Assessment Details    Patient interview started at: 835p and completed at: 905p.     Total duration spent on the patient case in minutes: 1.50 hrs      CPT code(s) utilized: 66149 - Psychotherapy for Crisis - 60 (30-74*) min       Haley Barreto, SABRA, MSW, LICSW, Psychotherapist  DEC - Triage & Transition Services  Callback: [phone]

## 2023-06-17 NOTE — ED PROVIDER NOTES
"  HPI   Pt is a 36 yo female presenting with concerns about being mentally unstable.  She has a long hx of mental health related problems and is recently having trouble getting in to see her providers.  She is taking her medicines as directed.  She is not suicidal or homicidal.  She is concerned about hurting herself \"mentally.\"          Allergies:  Allergies   Allergen Reactions     Dust Mites Shortness Of Breath     Animal Dander      Other reaction(s): *Unknown     Mold      Other reaction(s): Runny Nose     Trees      Problem List:    Patient Active Problem List    Diagnosis Date Noted     mirena IUD 9/30/22 09/30/2022     Priority: Medium     Mirena IUD placed 9/30/2022  LOT# [id number]  Exp: 10/2024  NDC# 92177-339-72    Lexie Cartagena on 9/30/2022 at 1:35 PM           Overdose, undetermined intent, initial encounter 06/18/2022     Priority: Medium     Segmental and somatic dysfunction 05/10/2022     Priority: Medium     SI (sacroiliac) joint dysfunction 05/10/2022     Priority: Medium     Polypharmacy 04/25/2022     Priority: Medium     Sedated due to multiple medications 04/25/2022     Priority: Medium     Elevated LFTs 01/08/2022     Priority: Medium     Disorganized behavior 01/08/2022     Priority: Medium     Infection due to 2019 novel coronavirus 01/08/2022     Priority: Medium     Type 2 diabetes mellitus without complication, without long-term current use of insulin (H) 12/13/2021     Priority: Medium     Fatty liver 11/05/2021     Priority: Medium     Umbilical hernia without obstruction and without gangrene 10/26/2021     Priority: Medium     Schizoaffective disorder (H) 07/13/2021     Priority: Medium     Morbid obesity (H) 01/02/2019     Priority: Medium     Paranoia (psychosis) (H) 08/24/2018     Priority: Medium     Gastroesophageal reflux disease without esophagitis 06/08/2018     Priority: Medium     Schizoaffective disorder, bipolar type (H) 05/07/2018     Priority: Medium     Encounter for " IUD insertion 09/15/2017     Priority: Medium     4/24/22 bryanna insertion lot# [id number] exp-4/2023       Seasonal allergic rhinitis due to pollen 11/04/2016     Priority: Medium     Allergic rhinitis due to mold 11/04/2016     Priority: Medium     Allergic rhinitis due to animal dander 11/04/2016     Priority: Medium     Allergic rhinitis due to dust mite 11/04/2016     Priority: Medium     Depression with anxiety 01/26/2015     Priority: Medium     Insomnia 07/18/2013     Priority: Medium     Bipolar 1 disorder (H) 12/06/2012     Priority: Medium     Planning on seeing Purvi (psychiatric nurse)       Paranoid type delusional disorder (H) 11/14/2012     Priority: Medium     Psychosis (H) 10/16/2012     Priority: Medium     Animal dander allergy 05/09/2012     Priority: Medium     Dog and Cat       CARDIOVASCULAR SCREENING; LDL GOAL LESS THAN 160 05/09/2012     Priority: Medium      Past Medical History:    Past Medical History:   Diagnosis Date     Bipolar 1 disorder (H) 12/6/2012     Depressive disorder      Diabetes mellitus, type 2 (H) 12/13/2021     Paranoid type delusional disorder (H) 11/14/2012     Past Surgical History:    Past Surgical History:   Procedure Laterality Date     TONSILLECTOMY & ADENOIDECTOMY      as a child     TONSILLECTOMY & ADENOIDECTOMY       Family History:    Family History   Adopted: Yes   Problem Relation Age of Onset     Unknown/Adopted Mother      Unknown/Adopted Father      Unknown/Adopted Other      Hypertension Sister      Social History:  Marital Status:  Single [1]  Social History     Tobacco Use     Smoking status: Former     Packs/day: 0.50     Types: Cigarettes     Smokeless tobacco: Former     Tobacco comments:     around 2nd hand smoke   Vaping Use     Vaping status: Never Used     Passive vaping exposure: Yes   Substance Use Topics     Alcohol use: Not Currently     Drug use: Not Currently      Medications:    ACCU-CHEK GUIDE test strip  acetaminophen (TYLENOL) 325 MG  tablet  albuterol (PROAIR HFA/PROVENTIL HFA/VENTOLIN HFA) 108 (90 Base) MCG/ACT inhaler  ammonium lactate (LAC-HYDRIN) 12 % external cream  azelastine (ASTELIN) 0.1 % nasal spray  blood glucose (ONETOUCH VERIO IQ) test strip  Blood Glucose Monitoring Suppl (ONETOUCH VERIO FLEX SYSTEM) w/Device KIT  budesonide-formoterol (SYMBICORT) 80-4.5 MCG/ACT Inhaler  fluticasone (FLONASE) 50 MCG/ACT nasal spray  gabapentin (NEURONTIN) 300 MG capsule  ibuprofen (ADVIL/MOTRIN) 200 MG tablet  insulin pen needle (32G X 4 MM) 32G X 4 MM miscellaneous  lamoTRIgine (LAMICTAL) 200 MG tablet  lamoTRIgine (LAMICTAL) 25 MG tablet  levonorgestrel (MIRENA) 20 MCG/DAY IUD  levothyroxine (SYNTHROID/LEVOTHROID) 50 MCG tablet  loratadine (CLARITIN) 10 MG tablet  OneTouch Delica Lancets 33G MISC  risperiDONE (RISPERDAL) 2 MG tablet  risperiDONE microspheres ER (RISPERDAL CONSTA) 50 MG injection  spacer ([facility]) holding chamber  triamcinolone (KENALOG) 0.1 % external ointment  VICTOZA PEN 18 MG/3ML soln      Review of Systems   All other systems reviewed and are negative.      PE   BP: 123/87  Pulse: 95  SpO2: 95 %  Physical Exam  Vitals reviewed.   Constitutional:       General: She is not in acute distress.     Appearance: She is well-developed.   HENT:      Head: Normocephalic and atraumatic.      Right Ear: External ear normal.      Left Ear: External ear normal.      Nose: Nose normal.      Mouth/Throat:      Mouth: Mucous membranes are moist.      Pharynx: Oropharynx is clear.   Eyes:      Extraocular Movements: Extraocular movements intact.      Conjunctiva/sclera: Conjunctivae normal.      Pupils: Pupils are equal, round, and reactive to light.   Cardiovascular:      Rate and Rhythm: Normal rate and regular rhythm.   Pulmonary:      Effort: Pulmonary effort is normal.   Musculoskeletal:         General: Normal range of motion.      Cervical back: Normal range of motion.   Skin:     General: Skin is warm and dry.    Neurological:      Mental Status: She is alert and oriented to person, place, and time.   Psychiatric:         Behavior: Behavior normal.         ED COURSE and MDM   1956.  Patient has symptoms and signs as described above.  Multiple mental health concerns.  DEC requested, appreciated.        2115.  I reviewed the case with our DEC consultant.  Recommendation is to follow-up with her scheduled providers next week.  No need for emergent hospitalization.  Patient agrees with this plan.  However, she is requesting that she receive her risperidone 50 mg IM shot.  Unfortunately, we do not have the shot available.  She is still comfortable with the plan for discharge.    Electronic medical chart reviewed, including medical problems, medications, medical allergies, social history.  Recent hospitalizations and surgical procedures reviewed.  Recent clinic visits and consultations reviewed.  Recent labs and test results reviewed.  Nursing notes reviewed.    The patient, their parent if applicable, and/or their medical decision maker(s) and I have reviewed all of the available historical information, applicable PMH, physical exam findings, and objective diagnostic data gathered during this ED visit.  We then discussed all work-up options and then together agreed upon the course taken during this visit.  The ultimate disposition and plan was a cooperative decision made between myself and the patient, their parent if applicable, and/or their legal decision maker(s).  The risks and benefits of all decisions made during this visit were discussed to the best of my abilities given the circumstances, and all parties are understanding of the pertinent ramifications of these decisions.      LABS  Labs Ordered and Resulted from Time of ED Arrival to Time of ED Departure - No data to display    IMAGING  Images reviewed by me.  Radiology report also reviewed.  No orders to display       Procedures    Medications - No data to  display      IMPRESSION       ICD-10-CM    1. Mental health problem  F48.9                Medication List      There are no discharge medications for this visit.                     Chapin Licona MD  06/16/23 8000

## 2023-06-17 NOTE — DISCHARGE INSTRUCTIONS
RETURN TO THE EMERGENCY ROOM FOR THE FOLLOWING:    Concern for harm to self or others, or at anytime for any concern.    FOLLOW UP:    With your scheduled providers next week.    TREATMENT RECOMMENDATIONS:    Continue to take your medicine as directed.    NURSE ADVICE LINE:  [phone] or [phone]

## 2023-06-19 ENCOUNTER — TELEPHONE (OUTPATIENT)
Dept: PSYCHIATRY | Facility: CLINIC | Age: 38
End: 2023-06-19
Payer: COMMERCIAL

## 2023-06-19 NOTE — TELEPHONE ENCOUNTER
"PSYCHIATRY CLINIC PHONE INTAKE     SERVICES REQUESTED / INTERESTED IN          Med Management    Presenting Problem and Brief History                              What would you like to be seen for? (brief description):    Joaquim pt for 3-4 years - sees PCP at [facility], PCP referred to [facility] for med management.    Patient feels like meds are helping but doesn't think doses are correct and may need to be changed. Pt can't take antidepressants because the cause high energy/sofía.      Have you received a mental health diagnosis? Yes   Which one (s): major depressive disorder, borderline personality disorder, anxiety, schizoaffective bipolar, bipolar II (?), binge eating disorder, intellectual disability/challenge  Is there any history of developmental delay?  No   Are you currently seeing a mental health provider?  Yes            Who / month last seen:  Therapy - [facility], Getting Lamictal/risperdal refills from     Do you have mental health records elsewhere?  Yes  [facility] - [facility] worker, [facility] with [facility],  [facility] in [place] for individual therapy, [facility] - Occupational Therapy  Will you sign a release so we can obtain them?  Yes    Have you ever been hospitalized for psychiatric reasons?  Yes  Describe:  \"many times.\" This year: [facility] in January 2023, 1 night at [facility] in 2023 June of 2022 for a month and a half at [facility], most hospitalizations through [facility], a couple times after 2008 at [facility],      Do you have current thoughts of self-harm?  No    Do you currently have thoughts of harming others?  No    Do you have any safety concerns? No   If yes to these, offer to reach out to a  for follow up.      Substance Use History     Do you have any history of alcohol / illicit drug use?  No  Describe:  na  Have you ever received treatment for this?  No    Describe:  na     " Social History     Who is the patient's a guardian?  self    Name / number: self  Have you had an ACT team in last 12 months?  No  Describe: na   OK to leave a detailed voicemail?  Yes        Medical/ Surgical History                                   Patient Active Problem List   Diagnosis     Animal dander allergy     CARDIOVASCULAR SCREENING; LDL GOAL LESS THAN 160     Psychosis (H)     Paranoid type delusional disorder (H)     Bipolar 1 disorder (H)     Insomnia     Depression with anxiety     Seasonal allergic rhinitis due to pollen     Allergic rhinitis due to mold     Allergic rhinitis due to animal dander     Allergic rhinitis due to dust mite     Encounter for IUD insertion     Schizoaffective disorder, bipolar type (H)     Gastroesophageal reflux disease without esophagitis     Paranoia (psychosis) (H)     Morbid obesity (H)     Schizoaffective disorder (H)     Umbilical hernia without obstruction and without gangrene     Fatty liver     Type 2 diabetes mellitus without complication, without long-term current use of insulin (H)     Elevated LFTs     Disorganized behavior     Infection due to 2019 novel coronavirus     Polypharmacy     Sedated due to multiple medications     Overdose, undetermined intent, initial encounter     Segmental and somatic dysfunction     SI (sacroiliac) joint dysfunction     mirena IUD 9/30/22          Medications             Have you taken >3 psychiatric medications in your past?  yes  Do you currently take 5 or more medications, including prescriptions, supplements, and other over the counter products?  yes    If YES to at least one of these questions:   As part of your evaluation in our clinic, we have specially trained pharmacists as part of your care team. Your provider would like for you to meet with one of our pharmacists to review your current and past medications, ensure your med list is up to date, and queue up any questions or concerns you have about medications. They  will review all of your medications, not just for mental health, to help ensure you know what you re taking and that everything is working together.     Please schedule patient in [facility] PSYCHIATRY (Eli Ho or Amanda Sorto) for 60m MTM in any green space as virtual (video), telephone, or in person (designated in person days per Epic templates).  -Appt notes can say  Psych eval on xx/xx   -Route telephone encounter to the pharmacist who will be seeing the patient.  If patient has questions about insurance coverage or billing, please still schedule the visit and refer them to call the [facility] coordinators at [phone].    Current Outpatient Medications   Medication Sig Dispense Refill     ACCU-CHEK GUIDE test strip USE TO TEST BLOOD SUGAR 6 TIMES DAILY OR AS DIRECTED 600 strip 3     acetaminophen (TYLENOL) 325 MG tablet Take 2 tablets (650 mg) by mouth every 4 hours as needed for mild pain (to moderate pain)       albuterol (PROAIR HFA/PROVENTIL HFA/VENTOLIN HFA) 108 (90 Base) MCG/ACT inhaler Inhale 2 puffs into the lungs every 4 hours as needed for wheezing or shortness of breath / dyspnea 18 g 1     ammonium lactate (LAC-HYDRIN) 12 % external cream Apply topically 2 times daily as needed for dry skin 385 g 3     azelastine (ASTELIN) 0.1 % nasal spray Spray 2 sprays into both nostrils 2 times daily as needed for rhinitis 30 mL 3     blood glucose (ONETOUCH VERIO IQ) test strip Use to test blood sugar 2 times daily or as directed. 100 strip 11     Blood Glucose Monitoring Suppl (ONETOUCH VERIO FLEX SYSTEM) w/Device KIT 1 each as needed 1 kit 0     budesonide-formoterol (SYMBICORT) 80-4.5 MCG/ACT Inhaler Inhale 2 puffs into the lungs 2 times daily 10.2 g 11     fluticasone (FLONASE) 50 MCG/ACT nasal spray Spray 2 sprays into both nostrils daily 15.8 mL 11     gabapentin (NEURONTIN) 300 MG capsule Take 2 capsules (600 mg) by mouth At Bedtime 60 capsule 5     ibuprofen (ADVIL/MOTRIN) 200 MG tablet Take 200 mg  by mouth every 4 hours as needed for pain       insulin pen needle (32G X 4 MM) 32G X 4 MM miscellaneous Use 1 pen needles daily with Victoza 50 each 0     lamoTRIgine (LAMICTAL) 200 MG tablet Take 200 mg by mouth At Bedtime Also 25mg in the AM and 25mg at llunch time.       lamoTRIgine (LAMICTAL) 25 MG tablet TAKE 1 TABLET BY MOUTH TWICE A DAY IN THE MORNING AND AFTERNOON.       levonorgestrel (MIRENA) 20 MCG/DAY IUD 1 each (20 mcg) by Intrauterine route once       levothyroxine (SYNTHROID/LEVOTHROID) 50 MCG tablet Take 1 tablet (50 mcg) by mouth daily before breakfast 90 tablet 3     loratadine (CLARITIN) 10 MG tablet Take 1 tablet (10 mg) by mouth daily as needed for allergies 30 tablet 4     OneTouch Delica Lancets 33G MISC 1 each 2 times daily 100 each 11     risperiDONE (RISPERDAL) 2 MG tablet Take 1 tablet (2 mg) by mouth At Bedtime 30 tablet 5     risperiDONE microspheres ER (RISPERDAL CONSTA) 50 MG injection Next due 6/30       spacer (OPTICHAMBER APOLINAR) holding chamber Use with Symbicort and albuterol inhalers as prescribed 1 each 0     triamcinolone (KENALOG) 0.1 % external ointment Apply topically 2 times daily For up to 2 weeks 30 g 1     VICTOZA PEN 18 MG/3ML soln INJECT 1.8 MG UNDER THE SKIN ONCE DAILY 9 mL 2         DISPOSITION      Phone screen completed with patient, scheduled for CAT eval on 8/11, and MTM on 7/26. Mailed new patient packet to address in chart.     Elvira De Leon

## 2023-06-20 ENCOUNTER — TELEPHONE (OUTPATIENT)
Dept: FAMILY MEDICINE | Facility: CLINIC | Age: 38
End: 2023-06-20
Payer: COMMERCIAL

## 2023-06-20 NOTE — TELEPHONE ENCOUNTER
Detailed message left on pt identified VM giving her phone number to call and schedule PT. Referral had been put in on 6/14/2023. Nicci Brewer RN

## 2023-06-20 NOTE — TELEPHONE ENCOUNTER
Order/Referral Request    Who is requesting: Maddy    Orders being requested: Referral    Reason service is needed/diagnosis: Referral for Physiologist for dislocated shoulder and back pain, have been getting stem from chiropractor. [employer]. Insurance need a referral and authorization    When are orders needed by: ASAP    Has this been discussed with Provider: No    Does patient have a preference on a Group/Provider/Facility? [facility]     Does patient have an appointment scheduled?: Yes: Has been seen     Where to send orders: Fax    Could we send this information to you in [facility] or would you prefer to receive a phone call?:   Patient would prefer a phone call   Okay to leave a detailed message?: Yes at Cell number on file:    Telephone Information:   Mobile [phone]

## 2023-06-21 ENCOUNTER — TRANSFERRED RECORDS (OUTPATIENT)
Dept: HEALTH INFORMATION MANAGEMENT | Facility: CLINIC | Age: 38
End: 2023-06-21
Payer: COMMERCIAL

## 2023-06-21 ENCOUNTER — TELEPHONE (OUTPATIENT)
Dept: FAMILY MEDICINE | Facility: CLINIC | Age: 38
End: 2023-06-21
Payer: COMMERCIAL

## 2023-06-21 LAB — PHQ9 SCORE: 19

## 2023-06-21 NOTE — TELEPHONE ENCOUNTER
Reason for Call: Request for an order or referral:    Order or referral being requested: Pt called and would like a referral or order to have Chiropractic and muscle stimulation done .    Date needed: as soon as possible    Has the patient been seen by the PCP for this problem? YES    Additional comments: none    Phone number Patient can be reached at:  Home number on file [phone] (home)    Best Time:  any    Can we leave a detailed message on this number?  YES    Call taken on 6/21/2023 at 7:36 AM by Jeniffer Vera

## 2023-06-22 ENCOUNTER — MYC MEDICAL ADVICE (OUTPATIENT)
Dept: FAMILY MEDICINE | Facility: CLINIC | Age: 38
End: 2023-06-22
Payer: COMMERCIAL

## 2023-06-23 NOTE — TELEPHONE ENCOUNTER
Pt was notified that she can check with her insurance and call a Chiropractor with out an order from her medical provider.

## 2023-06-26 NOTE — TELEPHONE ENCOUNTER
Requested lab orders sent through [employer] were faxed to [facility] lab, [employer] message sent to her informing her of this and telling her she can make a lab appointment. Nicci Brewer RN

## 2023-06-27 DIAGNOSIS — F20.9 SCHIZOPHRENIA (H): Primary | ICD-10-CM

## 2023-06-27 DIAGNOSIS — Z79.899 ENCOUNTER FOR LONG-TERM (CURRENT) USE OF HIGH-RISK MEDICATION: ICD-10-CM

## 2023-07-05 ENCOUNTER — MYC MEDICAL ADVICE (OUTPATIENT)
Dept: FAMILY MEDICINE | Facility: CLINIC | Age: 38
End: 2023-07-05
Payer: COMMERCIAL

## 2023-07-05 NOTE — TELEPHONE ENCOUNTER
Called Radu, pt's  & she states pt currently has cadi-waiver: has [employer] worker that goes out 1x/week & helps with goal-setting, getting groceries, budgeting, helps fill out any forms etc. Pt also had home-delivered meals in the past & Radu states she is currently working on getting this set up again. radu also working on getting nurse visits ordered but pt has refused this in the past.  Jeniffer Cabello RN         unable to perform

## 2023-07-06 ENCOUNTER — MYC MEDICAL ADVICE (OUTPATIENT)
Dept: FAMILY MEDICINE | Facility: CLINIC | Age: 38
End: 2023-07-06
Payer: COMMERCIAL

## 2023-07-06 NOTE — TELEPHONE ENCOUNTER
MyChart reply sent to patient and closing encounter.     Chiquis Roe RN  [facility]

## 2023-07-10 ENCOUNTER — NURSE TRIAGE (OUTPATIENT)
Dept: NURSING | Facility: CLINIC | Age: 38
End: 2023-07-10
Payer: COMMERCIAL
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
## 2023-07-11 ENCOUNTER — MYC MEDICAL ADVICE (OUTPATIENT)
Dept: FAMILY MEDICINE | Facility: CLINIC | Age: 38
End: 2023-07-11
Payer: COMMERCIAL

## 2023-07-11 NOTE — TELEPHONE ENCOUNTER
Please NOTE    I did not answer this My Chart message as it is very suspicious.  I have asked my supervisor Racquel Patricio RN, how we should handle this My Chart message.

## 2023-07-12 ENCOUNTER — VIRTUAL VISIT (OUTPATIENT)
Dept: SURGERY | Facility: CLINIC | Age: 38
End: 2023-07-12
Payer: COMMERCIAL

## 2023-07-12 DIAGNOSIS — E66.9 OBESITY (BMI 30-39.9): Primary | ICD-10-CM

## 2023-07-12 DIAGNOSIS — E11.9 TYPE 2 DIABETES MELLITUS WITHOUT COMPLICATION, WITHOUT LONG-TERM CURRENT USE OF INSULIN (H): ICD-10-CM

## 2023-07-12 PROCEDURE — 97803 MED NUTRITION INDIV SUBSEQ: CPT | Mod: VID | Performed by: DIETITIAN, REGISTERED

## 2023-07-12 NOTE — PROGRESS NOTES
Maddy Ortiz is a 37 year old who is being evaluated via a billable video visit.      How would you like to obtain your AVS? MyChart  If the video visit is dropped, the invitation should be resent by: Send to e-mail at: [email or web address]  Will anyone else be joining your video visit? No    Medical  Weight Loss Follow-Up Diet Evaluation  Assessment:  Maddy is presenting today for a follow up weight management nutrition consultation. Pt has had an initial appointment with Dr. Serna  Weight loss medication: victoza  Pt's weight is 221 lb   Initial weight: 202 lb  Weight change: down 32 lbs from high of 253 lbs, weight is trending downward        5/28/2022     4:35 AM   Changes and Difficulties   I have made the following changes to my diet since my last visit: meal planning   I have made the following changes to my activity/exercise since my last visit: active physical activity   With regards to my activity/exercise, I am still struggling with: rest - tend to work hard 1 week rest the next week     BMI: There is no height or weight on file to calculate BMI.  Ideal body weight: 52.4 kg (115 lb 8.3 oz)  Adjusted ideal body weight: 74.3 kg (163 lb 11.4 oz)    Estimated RMR ([place]-St Jeor equation):   1727 kcals x 1.2 (sedentary) = 2072 kcals (for weight maintenance)  Recommended Protein Intake: 60-80 grams of protein/day  Patient Active Problem List:  Patient Active Problem List   Diagnosis     Animal dander allergy     CARDIOVASCULAR SCREENING; LDL GOAL LESS THAN 160     Psychosis (H)     Paranoid type delusional disorder (H)     Bipolar 1 disorder (H)     Insomnia     Depression with anxiety     Seasonal allergic rhinitis due to pollen     Allergic rhinitis due to mold     Allergic rhinitis due to animal dander     Allergic rhinitis due to dust mite     Encounter for IUD insertion     Schizoaffective disorder, bipolar type (H)     Gastroesophageal reflux disease without esophagitis     Paranoia (psychosis) (H)      Morbid obesity (H)     Schizoaffective disorder (H)     Umbilical hernia without obstruction and without gangrene     Fatty liver     Type 2 diabetes mellitus without complication, without long-term current use of insulin (H)     Elevated LFTs     Disorganized behavior     Infection due to 2019 novel coronavirus     Polypharmacy     Sedated due to multiple medications     Overdose, undetermined intent, initial encounter     Segmental and somatic dysfunction     SI (sacroiliac) joint dysfunction     mirena IUD 9/30/22     Diabetes: reports blood glucose running around 100- not checking regularly  Most recent A1c 6.4 in June    Progress on goals from last visit:  She is struggling with hunger and feels it's her medicine that is increasing her appetite. She aims to eat 3-4x per day but will often (3x per week) miss breakfast because of appointments.   -Gets Mom's meals as well and goes to food shelf  1.  Plan and budget meals for the week - not met  2. Eat breakfast daily/3 meals per day - not met, misses breakfast 2-3x per week  3. Continue to increase veggies - not met, get a veggie serving in her Mom's meal each day, sometimes will get more than that, but has a limited budget and that limits her veggie intake    Dietary Recall:  Breakfast: Sometimes eats breakfast, sometimes skips due to doctors appointments - Mom's meal or bagel with cream cheese or PB, water or diet soda  Lunch: usually eats this meal each day, portions are more controlled when she eats breakfast  Dinner: two vashti cheeseburgers and diet soda from [employer]  Beverages:   water  Diet Soda  Exercise: not discussed today  Nutrition Diagnosis:    Obesity related to overeating and poor lifestyle habits as evidenced by patient's report of limited budget, inconsistent meals, large portions, frequent snacking of sweets, lack of activity and BMI 40.74    Intervention:  4. Food and/or nutrient delivery: continue to aim for 3 meals per day, working  on meal planning with daughter each week  5. Nutrition education: meal timing  6. Nutrition counseling: motivational interviewing, goal setting    Monitoring/Evaluation:    Goals:  1. Talk to daughter about groceries and meal planning  2. Eat breakfast daily/3 meals per day  3. Continue to increase veggies  4. Exercising by biking    Patient to follow up in 2 week(s) with bariatrician and 2 month(s) with RD    Video-Visit Details    Type of service:  Video Visit    Video Start Time (time video started): 9:01 am    Video End Time (time video stopped): 9:29 am    Originating Location (pt. Location): Home        Distant Location (provider location):  On-site    Mode of Communication:  Video Conference via [facility]    Physician has received verbal consent for a Video Visit from the patient? Yes      Angeli Domingo

## 2023-07-12 NOTE — LETTER
7/12/2023         RE: Maddy Ortiz  [address]  [place] [facility] [address]        Dear Colleague,    Thank you for referring your patient, Maddy Ortiz, to the [facility] [place]. Please see a copy of my visit note below.    Maddy Ortiz is a 37 year old who is being evaluated via a billable video visit.      How would you like to obtain your AVS? MyChart  If the video visit is dropped, the invitation should be resent by: Send to e-mail at: [email or web address]  Will anyone else be joining your video visit? No    Medical  Weight Loss Follow-Up Diet Evaluation  Assessment:  Maddy is presenting today for a follow up weight management nutrition consultation. Pt has had an initial appointment with Dr. Serna  Weight loss medication: victoza  Pt's weight is 221 lb   Initial weight: 202 lb  Weight change: down 32 lbs from high of 253 lbs, weight is trending downward        5/28/2022     4:35 AM   Changes and Difficulties   I have made the following changes to my diet since my last visit: meal planning   I have made the following changes to my activity/exercise since my last visit: active physical activity   With regards to my activity/exercise, I am still struggling with: rest - tend to work hard 1 week rest the next week     BMI: There is no height or weight on file to calculate BMI.  Ideal body weight: 52.4 kg (115 lb 8.3 oz)  Adjusted ideal body weight: 74.3 kg (163 lb 11.4 oz)    Estimated RMR ([place]-St Jeor equation):   1727 kcals x 1.2 (sedentary) = 2072 kcals (for weight maintenance)  Recommended Protein Intake: 60-80 grams of protein/day  Patient Active Problem List:  Patient Active Problem List   Diagnosis     Animal dander allergy     CARDIOVASCULAR SCREENING; LDL GOAL LESS THAN 160     Psychosis (H)     Paranoid type delusional disorder (H)     Bipolar 1 disorder (H)     Insomnia     Depression with anxiety     Seasonal allergic rhinitis due to  pollen     Allergic rhinitis due to mold     Allergic rhinitis due to animal dander     Allergic rhinitis due to dust mite     Encounter for IUD insertion     Schizoaffective disorder, bipolar type (H)     Gastroesophageal reflux disease without esophagitis     Paranoia (psychosis) (H)     Morbid obesity (H)     Schizoaffective disorder (H)     Umbilical hernia without obstruction and without gangrene     Fatty liver     Type 2 diabetes mellitus without complication, without long-term current use of insulin (H)     Elevated LFTs     Disorganized behavior     Infection due to 2019 novel coronavirus     Polypharmacy     Sedated due to multiple medications     Overdose, undetermined intent, initial encounter     Segmental and somatic dysfunction     SI (sacroiliac) joint dysfunction     mirena IUD 9/30/22     Diabetes: reports blood glucose running around 100- not checking regularly  Most recent A1c 6.4 in June    Progress on goals from last visit:  She is struggling with hunger and feels it's her medicine that is increasing her appetite. She aims to eat 3-4x per day but will often (3x per week) miss breakfast because of appointments.   -Gets Mom's meals as well and goes to food shelf  1.  Plan and budget meals for the week - not met  2. Eat breakfast daily/3 meals per day - not met, misses breakfast 2-3x per week  3. Continue to increase veggies - not met, get a veggie serving in her Mom's meal each day, sometimes will get more than that, but has a limited budget and that limits her veggie intake    Dietary Recall:  Breakfast: Sometimes eats breakfast, sometimes skips due to doctors appointments - Mom's meal or bagel with cream cheese or PB, water or diet soda  Lunch: usually eats this meal each day, portions are more controlled when she eats breakfast  Dinner: two vashti cheeseburgers and diet soda from [employer]  Beverages:   water  Diet Soda  Exercise: not discussed today  Nutrition Diagnosis:    Obesity  related to overeating and poor lifestyle habits as evidenced by patient's report of limited budget, inconsistent meals, large portions, frequent snacking of sweets, lack of activity and BMI 40.74    Intervention:  4. Food and/or nutrient delivery: continue to aim for 3 meals per day, working on meal planning with daughter each week  5. Nutrition education: meal timing  6. Nutrition counseling: motivational interviewing, goal setting    Monitoring/Evaluation:    Goals:  1. Talk to daughter about groceries and meal planning  2. Eat breakfast daily/3 meals per day  3. Continue to increase veggies  4. Exercising by biking    Patient to follow up in 2 week(s) with bariatrician and 2 month(s) with RD    Video-Visit Details    Type of service:  Video Visit    Video Start Time (time video started): 9:01 am    Video End Time (time video stopped): 9:29 am    Originating Location (pt. Location): Home        Distant Location (provider location):  On-site    Mode of Communication:  Video Conference via [facility]    Physician has received verbal consent for a Video Visit from the patient? Yes      Angeli Domingo        Again, thank you for allowing me to participate in the care of your patient.        Sincerely,        Angeli Domingo RD

## 2023-07-17 ENCOUNTER — TRANSFERRED RECORDS (OUTPATIENT)
Dept: HEALTH INFORMATION MANAGEMENT | Facility: CLINIC | Age: 38
End: 2023-07-17
Payer: COMMERCIAL

## 2023-07-17 LAB — PHQ9 SCORE: 15
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
## 2023-07-19 ENCOUNTER — OFFICE VISIT (OUTPATIENT)
Dept: FAMILY MEDICINE | Facility: CLINIC | Age: 38
End: 2023-07-19
Payer: COMMERCIAL

## 2023-07-19 VITALS
RESPIRATION RATE: 16 BRPM | WEIGHT: 226 LBS | BODY MASS INDEX: 37.65 KG/M2 | HEART RATE: 88 BPM | SYSTOLIC BLOOD PRESSURE: 112 MMHG | HEIGHT: 65 IN | DIASTOLIC BLOOD PRESSURE: 68 MMHG | TEMPERATURE: 98.2 F | OXYGEN SATURATION: 95 %

## 2023-07-19 DIAGNOSIS — E11.9 TYPE 2 DIABETES MELLITUS WITHOUT COMPLICATION, WITHOUT LONG-TERM CURRENT USE OF INSULIN (H): ICD-10-CM

## 2023-07-19 DIAGNOSIS — R53.82 CHRONIC FATIGUE: ICD-10-CM

## 2023-07-19 DIAGNOSIS — E55.9 VITAMIN D DEFICIENCY: ICD-10-CM

## 2023-07-19 DIAGNOSIS — R79.89 ELEVATED PROLACTIN LEVEL: Primary | ICD-10-CM

## 2023-07-19 DIAGNOSIS — R06.02 SHORTNESS OF BREATH: ICD-10-CM

## 2023-07-19 LAB — PROLACTIN SERPL 3RD IS-MCNC: 135 NG/ML (ref 5–23)

## 2023-07-19 PROCEDURE — 36415 COLL VENOUS BLD VENIPUNCTURE: CPT | Performed by: NURSE PRACTITIONER

## 2023-07-19 PROCEDURE — 84146 ASSAY OF PROLACTIN: CPT | Performed by: NURSE PRACTITIONER

## 2023-07-19 PROCEDURE — 99214 OFFICE O/P EST MOD 30 MIN: CPT | Performed by: NURSE PRACTITIONER

## 2023-07-19 RX ORDER — ALBUTEROL SULFATE 90 UG/1
2 AEROSOL, METERED RESPIRATORY (INHALATION) EVERY 4 HOURS PRN
Qty: 18 G | Refills: 1 | Status: SHIPPED | OUTPATIENT
Start: 2023-07-19 | End: 2023-09-06

## 2023-07-19 RX ORDER — CHOLECALCIFEROL (VITAMIN D3) 50 MCG
1 TABLET ORAL DAILY
Qty: 90 TABLET | Refills: 3 | Status: SHIPPED | OUTPATIENT
Start: 2023-07-19 | End: 2024-08-28

## 2023-07-19 ASSESSMENT — ANXIETY QUESTIONNAIRES
GAD7 TOTAL SCORE: 21
5. BEING SO RESTLESS THAT IT IS HARD TO SIT STILL: NEARLY EVERY DAY
4. TROUBLE RELAXING: NEARLY EVERY DAY
6. BECOMING EASILY ANNOYED OR IRRITABLE: NEARLY EVERY DAY
1. FEELING NERVOUS, ANXIOUS, OR ON EDGE: NEARLY EVERY DAY
3. WORRYING TOO MUCH ABOUT DIFFERENT THINGS: NEARLY EVERY DAY
2. NOT BEING ABLE TO STOP OR CONTROL WORRYING: NEARLY EVERY DAY
GAD7 TOTAL SCORE: 21
7. FEELING AFRAID AS IF SOMETHING AWFUL MIGHT HAPPEN: NEARLY EVERY DAY
IF YOU CHECKED OFF ANY PROBLEMS ON THIS QUESTIONNAIRE, HOW DIFFICULT HAVE THESE PROBLEMS MADE IT FOR YOU TO DO YOUR WORK, TAKE CARE OF THINGS AT HOME, OR GET ALONG WITH OTHER PEOPLE: VERY DIFFICULT

## 2023-07-19 ASSESSMENT — ENCOUNTER SYMPTOMS: NERVOUS/ANXIOUS: 1

## 2023-07-19 ASSESSMENT — PATIENT HEALTH QUESTIONNAIRE - PHQ9
SUM OF ALL RESPONSES TO PHQ QUESTIONS 1-9: 19
SUM OF ALL RESPONSES TO PHQ QUESTIONS 1-9: 19
10. IF YOU CHECKED OFF ANY PROBLEMS, HOW DIFFICULT HAVE THESE PROBLEMS MADE IT FOR YOU TO DO YOUR WORK, TAKE CARE OF THINGS AT HOME, OR GET ALONG WITH OTHER PEOPLE: EXTREMELY DIFFICULT

## 2023-07-19 ASSESSMENT — PAIN SCALES - GENERAL: PAINLEVEL: MODERATE PAIN (4)

## 2023-07-19 NOTE — PROGRESS NOTES
"  Assessment & Plan     Elevated prolactin level  We will recheck today in clinic.  - Prolactin; Future  - Prolactin    Vitamin D deficiency  Recommend starting supplement:  - vitamin D3 (CHOLECALCIFEROL) 50 mcg (2000 units) tablet; Take 1 tablet (50 mcg) by mouth daily    Shortness of breath  Refill inhaler:  - albuterol (PROAIR HFA/PROVENTIL HFA/VENTOLIN HFA) 108 (90 Base) MCG/ACT inhaler; Inhale 2 puffs into the lungs every 4 hours as needed for wheezing or shortness of breath    Type 2 diabetes mellitus without complication, without long-term current use of insulin (H)  Well-controlled.  Patient states that blood sugar monitoring has become very stressful for her.  Given her low A1c, she may discontinue blood sugar monitoring at this time.  Continue Victoza    Chronic fatigue  Etiology unclear, but suspect it is most related to her psych meds.  She has establish care with a new psychiatric provider and reports that they will be changing her medications.      The risks, benefits and treatment options of prescribed medications or other treatments have been discussed with the patient. The patient verbalized their understanding and should call or follow up if no improvement or if they develop further problems.    [facility]          Terry Castañeda is a 37 year old, presenting for the following health issues:  Diabetes, Anxiety, Depression (Is changing medications; changing / weaning off of lomitragine moving to lithium over the next month), Lipids (Cholesterol test result from psych.  They need to be addressed;  also her blood pressure needs to be monitored due to medications she is taking), and Musculoskeletal Problem (Back, neck, shoulder and hip pains.)        7/19/2023    12:46 PM   Additional Questions   Roomed by Susanne PINEDA       Accompanied by \"direct support person\"-  EMILY Wade worker          History of Present Illness     Asthma:  She presents for follow " up of asthma.  She has no cough, some wheezing, and some shortness of breath. She is not using a relief medication. She typically misses taking her controller medication 7 time(s) per week.Patient is aware of the following triggers: animal dander, dust mites, emotions, mold, pollens, smoke, strong odors and fumes and upper respiratory infections. The patient has had a visit to the Emergency Room, Urgent Care or Hospital due to asthma since the last clinic visit. She has been to the Emergency Room or Urgent Care 0 times.She has had a Hospitalization 0 times.    Back Pain:  She presents for follow up of back pain.       Since patient first noticed back pain, pain is: always present, but gets better and worse      Mental Health Follow-up:  Patient presents to follow-up on Depression & Anxiety.Patient's depression since last visit has been:  Bad  The patient is having other symptoms associated with depression.  Patient's anxiety since last visit has been:  Bad  The patient is having other symptoms associated with anxiety.  Any significant life events: relationship concerns, financial concerns, housing concerns, grief or loss and health concerns  Patient is feeling anxious or having panic attacks.  Patient has concerns about alcohol or drug use.    Diabetes:   She presents for follow up of diabetes.  She is not checking blood glucose. She is concerned about other. She is having numbness in feet and excessive thirst.         She eats 0-1 servings of fruits and vegetables daily.She consumes 0 sweetened beverage(s) daily.She exercises with enough effort to increase her heart rate 30 to 60 minutes per day.  She exercises with enough effort to increase her heart rate 7 days per week. She is missing 2 dose(s) of medications per week.         Sleep concern  Gets very tired after eating during the day  And after drinking alcohol  Naps every day  4-5 hours      Patient had labs drawn by her psychiatric provider.  Results reviewed  "today.  Patient's A1c is well controlled at 5.0%.  Her lipids were within normal limits for her age.  Vitamin D levels were mildly low.  Prolactin levels were elevated.  No other abnormalities were seen.                Review of Systems   Psychiatric/Behavioral:  The patient is nervous/anxious.       Constitutional, HEENT, cardiovascular, pulmonary, GI, , musculoskeletal, neuro, skin, endocrine and psych systems are negative, except as otherwise noted.      Objective    /68 (BP Location: Right arm, Cuff Size: Adult Large)   Pulse 88   Temp 98.2  F (36.8  C) (Tympanic)   Resp 16   Ht 1.651 m (5' 5\")   Wt 102.5 kg (226 lb)   LMP  (LMP Unknown)   SpO2 95%   Breastfeeding No   BMI 37.61 kg/m    Body mass index is 37.61 kg/m .  Physical Exam   GENERAL: no distress and tired appearing  NECK: no adenopathy, no asymmetry, masses, or scars and thyroid normal to palpation  RESP: lungs clear to auscultation - no rales, rhonchi or wheezes  CV: regular rate and rhythm, normal S1 S2, no S3 or S4, no murmur, click or rub, no peripheral edema and peripheral pulses strong  MS: no gross musculoskeletal defects noted, no edema                      "

## 2023-07-20 ENCOUNTER — TRANSFERRED RECORDS (OUTPATIENT)
Dept: HEALTH INFORMATION MANAGEMENT | Facility: CLINIC | Age: 38
End: 2023-07-20
Payer: COMMERCIAL

## 2023-07-20 NOTE — TELEPHONE ENCOUNTER
Form completed, signed, and mailed to [facility]. Copy of form sent to scan and copy placed in cabinet. Patient notified status of form via My Chart.

## 2023-07-23 ENCOUNTER — NURSE TRIAGE (OUTPATIENT)
Dept: NURSING | Facility: CLINIC | Age: 38
End: 2023-07-23
Payer: COMMERCIAL
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
## 2023-07-26 ENCOUNTER — OFFICE VISIT (OUTPATIENT)
Dept: SURGERY | Facility: CLINIC | Age: 38
End: 2023-07-26
Payer: COMMERCIAL

## 2023-07-26 VITALS
SYSTOLIC BLOOD PRESSURE: 120 MMHG | WEIGHT: 222.4 LBS | BODY MASS INDEX: 37.05 KG/M2 | DIASTOLIC BLOOD PRESSURE: 88 MMHG | HEIGHT: 65 IN

## 2023-07-26 DIAGNOSIS — E66.01 MORBID OBESITY (H): Primary | ICD-10-CM

## 2023-07-26 DIAGNOSIS — K76.0 FATTY LIVER: ICD-10-CM

## 2023-07-26 DIAGNOSIS — E11.9 TYPE 2 DIABETES MELLITUS WITHOUT COMPLICATION, WITHOUT LONG-TERM CURRENT USE OF INSULIN (H): ICD-10-CM

## 2023-07-26 PROCEDURE — 99214 OFFICE O/P EST MOD 30 MIN: CPT | Performed by: FAMILY MEDICINE

## 2023-07-26 RX ORDER — PHENOL 1.4 %
10 AEROSOL, SPRAY (ML) MUCOUS MEMBRANE AT BEDTIME
COMMUNITY
Start: 2022-03-29

## 2023-07-26 RX ORDER — RISPERIDONE 1 MG/1
1 TABLET ORAL AT BEDTIME
COMMUNITY
Start: 2023-03-13

## 2023-07-26 RX ORDER — LITHIUM CARBONATE 300 MG/1
300 TABLET, FILM COATED, EXTENDED RELEASE ORAL DAILY
COMMUNITY
Start: 2023-07-20
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
## 2023-07-30 ENCOUNTER — HOSPITAL ENCOUNTER (EMERGENCY)
Facility: CLINIC | Age: 38
Discharge: HOME | End: 2023-07-30
Attending: EMERGENCY MEDICINE | Admitting: EMERGENCY MEDICINE
Payer: COMMERCIAL

## 2023-07-30 ENCOUNTER — NURSE TRIAGE (OUTPATIENT)
Dept: NURSING | Facility: CLINIC | Age: 38
End: 2023-07-30
Payer: COMMERCIAL

## 2023-07-30 VITALS
TEMPERATURE: 98.5 F | OXYGEN SATURATION: 96 % | BODY MASS INDEX: 39.16 KG/M2 | SYSTOLIC BLOOD PRESSURE: 125 MMHG | DIASTOLIC BLOOD PRESSURE: 84 MMHG | HEART RATE: 91 BPM | RESPIRATION RATE: 18 BRPM | HEIGHT: 63 IN | WEIGHT: 221 LBS

## 2023-07-30 DIAGNOSIS — F41.8 DEPRESSION WITH ANXIETY: ICD-10-CM

## 2023-07-30 PROCEDURE — 90791 PSYCH DIAGNOSTIC EVALUATION: CPT

## 2023-07-30 PROCEDURE — 99285 EMERGENCY DEPT VISIT HI MDM: CPT | Mod: 25 | Performed by: EMERGENCY MEDICINE

## 2023-07-30 PROCEDURE — 99284 EMERGENCY DEPT VISIT MOD MDM: CPT | Performed by: EMERGENCY MEDICINE

## 2023-07-30 RX ORDER — RISPERIDONE 50 MG/2 ML
50 KIT INTRAMUSCULAR ONCE
Status: COMPLETED | OUTPATIENT
Start: 2023-07-30 | End: 2023-07-30

## 2023-07-30 RX ORDER — KETOCONAZOLE 20 MG/G
CREAM TOPICAL 2 TIMES DAILY
COMMUNITY
Start: 2023-07-07 | End: 2024-08-02

## 2023-07-30 RX ADMIN — RISPERIDONE 50 MG: KIT at 21:27

## 2023-07-30 ASSESSMENT — ACTIVITIES OF DAILY LIVING (ADL): ADLS_ACUITY_SCORE: 35

## 2023-07-30 NOTE — TELEPHONE ENCOUNTER
Patient calling, states that she missed her rispererdal injection on Thursday.  She wants to take the med to the ED for an injection. Advised that patient should just go to the ED if she needs help. Patient states she is not feeling good but wants to wait to see if the pharmacy can dispense her medication. She will call back if needed. No triage.     KARISSA NATH RN    Reason for Disposition   [1] Prescription refill request for NON-ESSENTIAL medicine (i.e., no harm to patient if med not taken) AND [2] triager unable to refill per department policy    Additional Information   Negative: New-onset or worsening symptoms, see that guideline (e.g., diarrhea, runny nose, sore throat)   Negative: Medicine question not related to refill or renewal   Negative: Caller (e.g., patient or pharmacist) requesting information about a new medicine   Negative: Caller requesting information unrelated to medicine   Negative: [1] Prescription refill request for ESSENTIAL medicine (i.e., likelihood of harm to patient if not taken) AND [2] triager unable to refill per department policy   Negative: [1] Prescription not at pharmacy AND [2] was prescribed by PCP recently  (Exception: triager has access to EMR and prescription is recorded there. Go to Home Care and confirm for pharmacy.)   Negative: [1] Pharmacy calling with prescription questions AND [2] triager unable to answer question   Negative: Prescription request for new medicine (not a refill)   Negative: Caller requesting a CONTROLLED substance prescription refill (e.g., narcotics, ADHD medicines)    Protocols used: Medication Refill and Renewal Call-A-

## 2023-07-31 NOTE — ED NOTES
Writer spoke with DEC  who reports that patient is appropriate for discharge. Pt requires a Risperdal injection and if she is unable to obtain it in the ED tonight she can get it from her home health nurse tomorrow.

## 2023-07-31 NOTE — ED PROVIDER NOTES
History     Chief Complaint   Patient presents with    Mental Health Problem     Pt reports she has been feeling more depressed, pt reports she takes an risperidone injections every other week. Pt reports she is overdue for her injection and thought maybe that was way she was feeling more depressed. Pt reports she has had some suicidal thoughts but has talked herself out of them. Pt does not have a specific plan in place       HPI  Maddy Ortiz is a 37 year old female who has a history of paranoia and depression.  Long history of such, she typically gets risperidone injections every 2 weeks.  Unfortunately she missed her shot that she was supposed to get last week.  She has been feeling more down and paranoid.  She reports her paranoia manifests as feeling like no one likes her.  No suicidal thoughts.  She says she feels safe at home but she was hoping to receive her injection of risperidone here in the emergency department because she does not feel comfortable giving it to herself.    Allergies:  Allergies   Allergen Reactions    Dust Mites Shortness Of Breath    Animal Dander      Other reaction(s): *Unknown    Mold      Other reaction(s): Runny Nose    Trees        Problem List:    Patient Active Problem List    Diagnosis Date Noted    mirena IUD 9/30/22 09/30/2022     Priority: Medium     Mirena IUD placed 9/30/2022  LOT# [id number]  Exp: 10/2024  NDC# 85099-330-86    Lexie Cartagena on 9/30/2022 at 1:35 PM          Overdose, undetermined intent, initial encounter 06/18/2022     Priority: Medium    Segmental and somatic dysfunction 05/10/2022     Priority: Medium    SI (sacroiliac) joint dysfunction 05/10/2022     Priority: Medium    Polypharmacy 04/25/2022     Priority: Medium    Sedated due to multiple medications 04/25/2022     Priority: Medium    Elevated LFTs 01/08/2022     Priority: Medium    Disorganized behavior 01/08/2022     Priority: Medium    Infection due to 2019 novel coronavirus 01/08/2022      Priority: Medium    Type 2 diabetes mellitus without complication, without long-term current use of insulin (H) 12/13/2021     Priority: Medium    Fatty liver 11/05/2021     Priority: Medium    Umbilical hernia without obstruction and without gangrene 10/26/2021     Priority: Medium    Schizoaffective disorder (H) 07/13/2021     Priority: Medium    Morbid obesity (H) 01/02/2019     Priority: Medium    Paranoia (psychosis) (H) 08/24/2018     Priority: Medium    Gastroesophageal reflux disease without esophagitis 06/08/2018     Priority: Medium    Schizoaffective disorder, bipolar type (H) 05/07/2018     Priority: Medium    Encounter for IUD insertion 09/15/2017     Priority: Medium     4/24/22 bryanna insertion lot# [id number] exp-4/2023      Seasonal allergic rhinitis due to pollen 11/04/2016     Priority: Medium    Allergic rhinitis due to mold 11/04/2016     Priority: Medium    Allergic rhinitis due to animal dander 11/04/2016     Priority: Medium    Allergic rhinitis due to dust mite 11/04/2016     Priority: Medium    Depression with anxiety 01/26/2015     Priority: Medium    Insomnia 07/18/2013     Priority: Medium    Bipolar 1 disorder (H) 12/06/2012     Priority: Medium     Planning on seeing Purvi (psychiatric nurse)      Paranoid type delusional disorder (H) 11/14/2012     Priority: Medium    Psychosis (H) 10/16/2012     Priority: Medium    Animal dander allergy 05/09/2012     Priority: Medium     Dog and Cat      CARDIOVASCULAR SCREENING; LDL GOAL LESS THAN 160 05/09/2012     Priority: Medium        Past Medical History:    Past Medical History:   Diagnosis Date    Bipolar 1 disorder (H) 12/6/2012    Depressive disorder     Diabetes mellitus, type 2 (H) 12/13/2021    Paranoid type delusional disorder (H) 11/14/2012       Past Surgical History:    Past Surgical History:   Procedure Laterality Date    TONSILLECTOMY & ADENOIDECTOMY      as a child    TONSILLECTOMY & ADENOIDECTOMY         Family History:    Family  "History   Adopted: Yes   Problem Relation Age of Onset    Unknown/Adopted Mother     Unknown/Adopted Father     Unknown/Adopted Other     Hypertension Sister        Social History:  Marital Status:  Single [1]  Social History     Tobacco Use    Smoking status: Former     Packs/day: 0.50     Types: Cigarettes    Smokeless tobacco: Former    Tobacco comments:     around 2nd hand smoke   Vaping Use    Vaping Use: Never used   Substance Use Topics    Alcohol use: Not Currently    Drug use: Not Currently        Medications:    ketoconazole (NIZORAL) 2 % external cream  ACCU-CHEK GUIDE test strip  acetaminophen (TYLENOL) 325 MG tablet  albuterol (PROAIR HFA/PROVENTIL HFA/VENTOLIN HFA) 108 (90 Base) MCG/ACT inhaler  ammonium lactate (LAC-HYDRIN) 12 % external cream  blood glucose (ONETOUCH VERIO IQ) test strip  Blood Glucose Monitoring Suppl (ONETOUCH VERIO FLEX SYSTEM) w/Device KIT  fluticasone (FLONASE) 50 MCG/ACT nasal spray  gabapentin (NEURONTIN) 300 MG capsule  ibuprofen (ADVIL/MOTRIN) 200 MG tablet  insulin pen needle (32G X 4 MM) 32G X 4 MM miscellaneous  lamoTRIgine (LAMICTAL) 200 MG tablet  lamoTRIgine (LAMICTAL) 25 MG tablet  levonorgestrel (MIRENA) 20 MCG/DAY IUD  levothyroxine (SYNTHROID/LEVOTHROID) 50 MCG tablet  lithium ER (LITHOBID) 300 MG CR tablet  loratadine (CLARITIN) 10 MG tablet  Melatonin 10 MG TABS tablet  OneTouch Delica Lancets 33G MISC  risperiDONE (RISPERDAL) 1 MG tablet  risperiDONE microspheres ER (RISPERDAL CONSTA) 50 MG injection  spacer (OPTICHAMBER APOLINAR) holding chamber  triamcinolone (KENALOG) 0.1 % external ointment  VICTOZA PEN 18 MG/3ML soln  vitamin D3 (CHOLECALCIFEROL) 50 mcg (2000 units) tablet          Review of Systems    Physical Exam   BP: 125/84  Pulse: 91  Temp: 98.5  F (36.9  C)  Resp: 18  Height: 160 cm (5' 3\")  Weight: 100.2 kg (221 lb)  SpO2: 96 %      Physical Exam  Constitutional:       General: She is not in acute distress.     Appearance: Normal appearance. She is " well-developed.   HENT:      Head: Normocephalic and atraumatic.   Eyes:      General: No scleral icterus.     Conjunctiva/sclera: Conjunctivae normal.   Cardiovascular:      Rate and Rhythm: Normal rate.   Pulmonary:      Effort: Pulmonary effort is normal. No respiratory distress.   Abdominal:      General: Abdomen is flat.   Musculoskeletal:      Cervical back: Normal range of motion and neck supple.   Skin:     General: Skin is warm and dry.      Findings: No rash.   Neurological:      Mental Status: She is alert and oriented to person, place, and time.   Psychiatric:         Speech: Speech is not rapid and pressured or slurred.         Thought Content: Thought content is paranoid. Thought content does not include homicidal ideation.      Comments: Not making eye contact but is thoughtful in her communication         ED Course                 Procedures              Critical Care time:  none               No results found. However, due to the size of the patient record, not all encounters were searched. Please check Results Review for a complete set of results.    Medications   risperiDONE microspheres ER (risperDAL CONSTA) injection 50 mg (has no administration in time range)       Assessments & Plan (with Medical Decision Making)   37-year-old female presents for evaluation of her depression, anxiety, paranoia.  Longstanding history of such.  Symptoms getting worse and she is feeling more comfortable with her thoughts.  I reviewed her nurse triage mental health note from 7/23/2023 documenting her worsening paranoia.  The patient was eval by DEC, I discussed the case on the phone with the evaluator.  We discussed further management and after both of us separately interviewed the patient we both feel the patient is safe to discharge home, hospitalization considered but does not seem appropriate or necessary at this time.  We will administer her risperidone here.  She is discharged with instructions to return at  any time if she feels worse or has other concerns, otherwise follow-up with her outpatient resources.  The patient is in agreement with this plan.    I have reviewed the nursing notes.    I have reviewed the findings, diagnosis, plan and need for follow up with the patient.             New Prescriptions    No medications on file       Final diagnoses:   Depression with anxiety       7/30/2023   [facility] EMERGENCY DEPT       Darek Wolf MD  07/30/23 2110

## 2023-07-31 NOTE — DISCHARGE INSTRUCTIONS
Aftercare Plan    -Continue work with community and personal supports regarding mental health and symptom management.   -Discuss time management with ILS worker to prevent missing future injections.    If I am feeling unsafe or I am in a crisis, I will:   Contact my established care providers   Call the National Suicide Prevention Lifeline: 754.809.5298   Go to the nearest emergency room   Call [phone]     Warning signs that I or other people might notice when a crisis is developing for me:     I am having increasing suicidal thoughts that turn to plans with intent or means  I am having additional urges to self-harm    My emotions are of hopelessness; feeling like there's no way out.  Rage or anger.  Engaging in risky activities without thinking  Withdrawing from family/friends  Dramatic mood swings  Drastic personality changes   Use of alcohol or drugs  Postings on social media  Neglect of personal hygiene or cares     Things I am able to do on my own to cope or help me feel better:    Spending quality time with loved ones  Staying hydrated  Eating balanced meals  Going for a walk every day  Take care of daily responsibilities/needs  Focus on positive self-talk vs negative self-talk    Things that I am able to do with others to cope or help me better:   Exercise  Music  Deep breathing  Meditations  Journal  Self-regulate  Self check-in  Ask for help    Things I can use or do for distraction:   Reach out to/spend time with family, friends  Shower  Exercise  Chores or do a project  Listen to music  Watch movie/TV  Listening to music  Journaling  Reading a book  Meditating  Call a friend    Changes I can make to support my mental health and wellness:    -I will abstain from all mood altering chemicals not currently prescribed to me   -I will attend scheduled mental health therapy and psychiatric appointments and follow all   recommendations  -I will commit to 30 minutes of self care daily - this can be as simple as taking  a shower, going for a   walk, cooking a meal, read, writing, etc  -I will practice square breathing when I begin to feel anxious - in breath through the nose for the count   of 4 and the first line on the square. Out breath through the mouth for the count of 4 for the second line   of the square. Repeat to complete the square. Repeat the square as many times as needed.  - I will use distraction skills of: going for walks, watching TV, spending time outside, calling a friend or   family member  -Use community resources, including hotline numbers, [facility] crisis and support meetings  -Maintain a daily schedule/routine  -Practice deep breathing skills  -Download a meditation russell and spend 15-20 minutes per day mediating/relaxing. Some apps to   download include: Calm, Headspace and Insight Timer. All 3 of these apps have free version    Reduce Extreme Emotion  QUICKLY:  Changing Your Body Chemistry      T:  Change your body Temperature to change your autonomic nervous system   Use Ice Water to calm yourself down FAST   Put your face in a bowl of ice water (this is the best way; have the person keep his/her face in ice water for 30-45 seconds - initial research is showing that the longer s/he can hold her/his face in the water, the better the response), or   Splash ice water on your face, or hold an ice pack on your face      I:  Intensely exercise to calm down a body revved up by emotion   Examples: running, walking fast, jumping, playing basketball, weight lifting, swimming, calisthenics, etc.   Engage in exercises that DO NOT include violent behaviors. Exercises that utilize violent behaviors tend to function as  behavioral rehearsal,  and rather than calming the person down, may actually  rev  the person up more, increasing the likelihood of violence, and lessening the likelihood that they will  burn off  energy     P:  Progressively relax your muscles   Starting with your hands, moving to your forearms, upper arms,  shoulders, neck, forehead, eyes, cheeks and lips, tongue and teeth, chest, upper back, stomach, buttocks, thighs, calves, ankles, feet   Tense (10 seconds,   of the way), then relax each muscle (all the way)   Notice the tension   Notice the difference when relaxed (by tensing first, and then relaxing, you are able to get a more thorough relaxation than by simply relaxing)      P: Paced breathing to relax   The standard technique is to begin with counting the number of steps one takes for a typical inhale, then counting the steps one takes for a typical exhale, and then lengthening the amount of steps for the exhalation by one or two steps.  OR  Repeat this pattern for 1-2 minutes  Inhale for four (4) seconds   Exhale for six (6) to eight (8) seconds   Research demonstrated that one can change one's overall level of anxiety by doing this exercise for even a few minutes per day      People in my life that I can ask for help:   Family  Friends  Providers    Your Critical access hospital has a mental health crisis team you can call 24/7:   [facility] Crisis Line Number: [phone]  [facility] Mental Health Crisis: [phone] - Call the crisis line for immediate mental health support, 24 hours a day.   [facility] Crisis Line Number: [phone]  [facility] Crisis Line Number: [phone]  [facility] Crisis Line Number: [phone]   [facility] Crisis Line Number: [phone]  [place]: [phone]  [place]: [phone]  [facility] Crisis Number: [phone]  [facility] Crisis: [phone]      Other things that are important when I'm in crisis:   Ask for help    Additional resources and information:     Mental Health Apps  My3  https://mySKURApp.org/    VirtualHopeBox  https://[email or web address]/virtual-hope-[email or web address]       Professionals or Agencies I Can Contact During A Crisis:       Crisis Lines  Call or Text 988 - National Suicide and Crisis Lifeline    Crisis  "Text Line  Text 805670  You will be connected with a trained live crisis counselor to provide support.    [employer] (LGBTQ Youth Crisis Line)  1.517.543.7296  text START to 151-310    National [place] on Mental Illness (PARESH)  502.491.6753 or 1.925.PARESH.HELPS    National Suicide Prevention Lifeline at 8-741-663-EVQV (9419)     Throughout  [place]: call **CRISIS (**366372)     Crisis Text Line: is available for free, 24/7 by texting [place] to 021581    [employer]  Fast Tracker  Linking people to mental health and substance use disorder resources  Redeemr.org     [place] Mental Health Warm Line  Peer to peer support  Monday thru Saturday, 12 pm to 10 pm  934.613.3264 or 1.800.274.1622  Text \"Support\" to 45581     National [place] on Mental Illness (www.[place].paresh.org): 934.238.4407 or 821-192-3504     [facility] Phone (free remote counseling): 217.782.4809 Web address:   https://Demdex.org/     [email or web address] (filter for insurance, gender preference, etc.)    [facility]   [phone]  Intake appointment will be virtual, following appointments can be in person or virtual.   **IMMEDIATE OPENINGS**    [facility]  [phone]  *offers individual therapy, medication management and Mental Health Case Workers; can self refer    [facility]  [phone]  *Immediate Openings    [facility]  [phone]  *Immediate Openings    [facility]  [phone]  *Immediate Openings    Please follow up with scheduled providers to ensure all necessary paperwork is filled out prior to your   scheduled telehealth appointments.     Coordinators from [facility] will be calling within two business days to ensure   that you have the resources you may need or provide assistance with scheduling (Phone number: [phone].).    Remember: give the referrals 3 sessions prior to calling it " quits. Do you trust them? Do you feel   understood? Do you think they can help? Check in with yourself after each session    Please reach out to the [facility]([phone]) regarding further mental health appointment needs for this emergency department visit.    [facility] SCHEDULING:  Today you were seen by a licensed mental health professional through [facility], [facility] ([facility])  for a crisis assessment in the Emergency Department at [facility].  It is recommended that you follow up with your estabished providers (psychiatrist, menta health therapist, and/or primary care doctor - as relevant) as soon as possible. Coordinators from [facility] will be calling you in the next 24-48 hours to ensure that you have the resources you need.  You can so contact [facility] coordinators directly at [phone].     [facility] maintains an extensive network of licensed behavioral health providers to connect patients with the services they need.  We do not charge providers a fee to participate in our referral network.  We match patients with providers based on a patient s specific needs, insurance coverage, and location.  Our first effort will be to refer you to a provider within your care system, and will utilize providers outside your care system as needed.

## 2023-07-31 NOTE — CONSULTS
"Diagnostic Evaluation Consultation  Crisis Assessment    Patient Name: Maddy Ortiz  Age:  37 year old  Legal Sex: female  Gender Identity: female  Pronouns:   Race:   Ethnicity: Not  or   Language: English      Patient was assessed: Virtual: [employer] Telemedicine Start Time: 2006 Telemedicine Stop Time: 2029  Patient location: [facility] EMERGENCY DEPT                             ED05    Referral Data and Chief Complaint  Maddy Ortiz presents to the ED by  self. Patient is presenting to the ED for the following concerns: Depression, Paranoia.   Factors that make the mental health crisis life threatening or complex are:   .      Informed Consent and Assessment Methods  Explained the crisis assessment process, including applicable information disclosures and limits to confidentiality, assessed understanding of the process, and obtained consent to proceed with the assessment.  Assessment methods included conducting a formal interview with patient, review of medical records, collaboration with medical staff, and obtaining relevant collateral information from family and community providers when available.  : done     Patient response to interventions: eager to participate, acceptance expressed, verbalizes understanding  Coping skills were attempted to reduce the crisis:  calling her ILS worker and home nurse.     History of the Crisis   Pt reports she has been late on her Risperidol injection and is hoping the ED can help her with this. Pt reports her depression symptoms have increased and reports experiencing somatic symptoms of soreness related to her depression. Pt reports she has also been more irritable, increased sleep, and feeling withdrawn. Pt reports she is one week late on her medication injection, and has noticed her depression symptoms have increased since the missed injection. Pt reports paranoia about leaving apartment due to fear of people not liking her, \"I know these " "are unreasonable\" and reports these have been present for a week as well. Pt reports she missed her injection because, \"I was really busy and just didn't pick it up.\"    Brief Psychosocial History  Family:  Single (pt reports she never  her javier's father), Children yes (pt reports she has a daughter and is the primary caregiver to her, \"her father is very absent and makes us feel bad when we ask him for support\")  Support System:  Parent(s), Sibling(s)  Employment Status:  disabled  Source of Income:  disability, social security  Financial Environmental Concerns:  other (see comments) (pt reports she gained debt in Covid and has been trying to pay this off)  Current Hobbies:  home improvement, poetry reading/writing, arts/crafts, group/social activities, exercise/fitness, outdoor activities, reading, writing/journaling/blogging  Barriers in Personal Life:  lack of time, mental health concerns    Significant Clinical History  Current Anxiety Symptoms:     Current Depression/Trauma:  irritable, withdrawl/isolation  Current Somatic Symptoms:  somatic symptoms (abdominal pain, headache, tension)  Current Psychosis/Thought Disturbance:   (paranoia about leaving apartment due to fear of people not liking her, \"I know these are unreasonable\")  Current Eating Symptoms:     Chemical Use History:  Alcohol: None  Benzodiazepines: None  Opiates: None  Cocaine: None  Marijuana: None  Other Use: None   Past diagnosis:   (schizoaffective, bipolar type)  Family history:  No known history of mental health or chemical health concerns  Past treatment:  Individual therapy, Psychiatric Medication Management, Inpatient Hospitalization, Day Treatment  Details of most recent treatment:  Per chart review, pt has a history of ED visits (last on 6/16/2023) and inpatient stays for mental health (last in 06/2022). Pt reports she is involved in therapy (weekly) and medication mangement. Pt reports she also has a home health nurse that " comes to her house weekly and an in-home skills worker 3x weekly for total fo 12 hours per week.  Other relevant history:          Collateral Information  Is there collateral information: No     Collateral information name, relationship, phone number:       What happened today:       What is different about patient's functioning:       Concern about alcohol/drug use:      What do you think the patient needs:      Has patient made comments about wanting to kill themselves/others:      If d/c is recommended, can they take part in safety/aftercare planning:       Additional collateral information:        Risk Assessment  [place] Suicide Severity Rating Scale Full Clinical Version:  Suicidal Ideation  Q1 Wish to be Dead (Lifetime): Yes  Q2 Non-Specific Active Suicidal Thoughts (Lifetime): Yes  3. Active Suicidal Ideation with any Methods (Not Plan) Without Intent to Act (Lifetime): Yes  Q4 Active Suicidal Ideation with Some Intent to Act, Without Specific Plan (Lifetime): Yes  Q5 Active Suicidal Ideation with Specific Plan and Intent (Lifetime): No  Q6 Suicide Behavior (Lifetime): no     Suicidal Behavior (Lifetime)  Actual Attempt (Lifetime): No  Has subject engaged in non-suicidal self-injurious behavior? (Lifetime): No  Interrupted Attempts (Lifetime): No  Aborted or Self-Interrupted Attempt (Lifetime): No  Preparatory Acts or Behavior (Lifetime): No    [place] Suicide Severity Rating Scale Recent:   Suicidal Ideation (Recent)  Q1 Wished to be Dead (Past Month): no  Q2 Suicidal Thoughts (Past Month): no  Q4 Suicidal Intent without Specific Plan: no  Q5 Suicide Intent with Specific Plan: no  Level of Risk per Screen: low risk     Suicidal Behavior (Recent)  Actual Attempt (Past 3 Months): No  Has subject engaged in non-suicidal self-injurious behavior? (Past 3 Months): No  Interrupted Attempts (Past 3 Months): No  Aborted or Self-Interrupted Attempt (Past 3 Months): No  Preparatory Acts or Behavior (Past 3 Months):  No    Environmental or Psychosocial Events: other life stressors, other (see comment) (missed medication)  Protective Factors: Protective Factors: strong bond to family unit, community support, or employment, lives in a responsibly safe and stable environment, good treatment engagement, responsibilities and duties to others, including pets and children, sense of importance of health and wellness, able to access care without barriers, supportive ongoing medical and mental health care relationships, help seeking, good impulse control, good problem-solving, coping, and conflict resolution skills, cultural, spiritual , or Mandaen beliefs associated with meaning and value in life, reality testing ability, constructive use of leisure time, enjoyable activities, resilience    Does the patient have thoughts of harming others? Feels Like Hurting Others: no  Previous Attempt to Hurt Others: no  Is the patient engaging in sexually inappropriate behavior?: no    Is the patient engaging in sexually inappropriate behavior?  no        Mental Status Exam   Affect: Appropriate  Appearance: Appropriate  Attention Span/Concentration: Attentive  Eye Contact: Engaged    Fund of Knowledge: Appropriate   Language /Speech Content: Fluent  Language /Speech Volume: Soft  Language /Speech Rate/Productions: Normal  Recent Memory: Intact  Remote Memory: Intact  Mood: Depressed  Orientation to Person: Yes   Orientation to Place: Yes  Orientation to Time of Day: Yes  Orientation to Date: Yes     Situation (Do they understand why they are here?): Yes  Psychomotor Behavior: Normal  Thought Content: Paranoia  Thought Form: Intact     Mini-Cog Assessment  Number of Words Recalled:    Clock-Drawing Test:     Three Item Recall:    Mini-Cog Total Score:       Medication  Psychotropic medications:   Medication Orders - Psychiatric (From admission, onward)      Start     Dose/Rate Route Frequency Ordered Stop    07/30/23 2100  risperiDONE microspheres  ER (risperDAL CONSTA) injection 50 mg         50 mg Intramuscular ONCE 07/30/23 2100               Current Care Team  Patient Care Team:  Shirley Freeman APRN CNP as PCP - General (Nurse Practitioner)  Christie as [facility] worker  Eileen Stone  as  (Licensed Mental Health)  [facility] Consults as Occupational Therapist (Licensed Mental Health)  Aggie Lehman MD as MD (INTERNAL MEDICINE - ENDOCRINOLOGY, DIABETES & METABOLISM)  Shirley Freeman APRN CNP as Assigned PCP  Alexander Montemayor MD as MD (Psychiatry)  [place] as Therapist (Licensed Mental Health)  Rudy Shin MD as Assigned Allergy Provider  Anna Hampton RD as Diabetes Educator (Dietitian, Registered)  Krishna Olivia MD as Assigned Neuroscience Provider  Elías Montero MD as Surgeon (Surgery)  Pam Cloud MD as Assigned OBGYN Provider  Shirley Gooden APRN CNP (Psychiatry)  Rachid Garza DPM as Assigned Surgical Provider  Angeli Domingo RD as Registered Dietitian (Dietitian, Registered)    Diagnosis  Patient Active Problem List   Diagnosis Code    Animal dander allergy J30.81    CARDIOVASCULAR SCREENING; LDL GOAL LESS THAN 160 Z13.6    Psychosis (H) F29    Paranoid type delusional disorder (H) F22    Bipolar 1 disorder (H) F31.9    Insomnia G47.00    Depression with anxiety F41.8    Seasonal allergic rhinitis due to pollen J30.1    Allergic rhinitis due to mold J30.89    Allergic rhinitis due to animal dander J30.81    Allergic rhinitis due to dust mite J30.89    Encounter for IUD insertion Z30.430    Schizoaffective disorder, bipolar type (H) F25.0    Gastroesophageal reflux disease without esophagitis K21.9    Paranoia (psychosis) (H) F22    Morbid obesity (H) E66.01    Schizoaffective disorder (H) F25.9    Umbilical hernia without obstruction and without gangrene K42.9    Fatty liver K76.0    Type 2 diabetes mellitus without complication, without long-term  current use of insulin (H) E11.9    Elevated LFTs R79.89    Disorganized behavior R46.89    Infection due to 2019 novel coronavirus U07.1    Polypharmacy Z79.899    Sedated due to multiple medications R41.89    Overdose, undetermined intent, initial encounter T50.904A    Segmental and somatic dysfunction M99.09    SI (sacroiliac) joint dysfunction M53.3    mirena IUD 9/30/22 Z30.430       Primary Problem This Admission  Active Hospital Problems    *Schizoaffective disorder, bipolar type (H)        Clinical Summary and Substantiation of Recommendations   After therapeutic assessment, intervention and aftercare planning by ED care team and LMHP and in consultation with attending provider, the patient's circumstances and mental state were appropriate for outpatient management. It is the recommendation of this clinician that pt discharge with OP MH support. A this time the pt is not presenting as an acute risk to self or others due to the following factors: Pt presents after missing her injection of a mental health medication. Pt reports since her missed injection she has been feeling an increase in depression and paranois about people not liking her. Pt denies current SI/SIB/HI/SA and denies plans, means, or intent to harm herself or others. Pt is recommended to continue with outpatient providers for mental health services.                          Patient coping skills attempted to reduce the crisis:  calling her ILS worker and home nurse.    Disposition  Recommended disposition: Individual Therapy, Medication Management, In Home Therapy, Other. please comment (ILS worker and in-home nurse care)        Reviewed case and recommendations with attending provider. Attending Name: Dr. Wolf       Attending concurs with disposition: yes       Patient and/or validated legal guardian concurs with disposition:   yes       Final disposition:  discharge    Legal status on admission: Voluntary/Patient has signed consent for  treatment    Assessment Details   Total duration spent on the patient case in minutes: 23 min     CPT code(s) utilized: Non-Billable    CARLOS ALBERTO Garcia, ROSALVAC, Psychotherapist  [facility] - Triage & Transition Services  Callback: [phone]

## 2023-07-31 NOTE — ED TRIAGE NOTES
Pt reports she has been feeling more depressed, pt reports she takes an risperidone injections every other week. Pt reports she is overdue for her injection and thought maybe that was way she was feeling more depressed. Pt reports she has had some suicidal thoughts but has talked herself out of them. Pt does not have a specific plan in place

## 2023-08-04 ENCOUNTER — TRANSFERRED RECORDS (OUTPATIENT)
Dept: HEALTH INFORMATION MANAGEMENT | Facility: CLINIC | Age: 38
End: 2023-08-04
Payer: COMMERCIAL

## 2023-08-04 LAB — PHQ9 SCORE: 21

## 2023-08-11 ENCOUNTER — ALLIED HEALTH/NURSE VISIT (OUTPATIENT)
Dept: FAMILY MEDICINE | Facility: CLINIC | Age: 38
End: 2023-08-11
Payer: COMMERCIAL

## 2023-08-11 DIAGNOSIS — F25.0 SCHIZOAFFECTIVE DISORDER, BIPOLAR TYPE (H): Primary | Chronic | ICD-10-CM

## 2023-08-11 PROCEDURE — 99207 PR NO CHARGE NURSE ONLY: CPT

## 2023-08-15 NOTE — TELEPHONE ENCOUNTER
[facility]ly [facility] worker called back.  We discussed my concerns about patient's declining mental health.  Also discussed patient's inappropriate questions regarding her minor daughter.  Formerly [facility] worker has an appointment with patient tomorrow - she will assess and be in touch with me as needed.  Shirley Freeman CNP      TIMOTHY was obtained - scanned into EPIC   What Type Of Note Output Would You Prefer (Optional)?: Bullet Format Has Your Skin Lesion Been Treated?: not been treated Is This A New Presentation, Or A Follow-Up?: Skin Lesions

## 2023-08-18 ENCOUNTER — TRANSFERRED RECORDS (OUTPATIENT)
Dept: HEALTH INFORMATION MANAGEMENT | Facility: CLINIC | Age: 38
End: 2023-08-18

## 2023-08-18 ENCOUNTER — TELEPHONE (OUTPATIENT)
Dept: FAMILY MEDICINE | Facility: CLINIC | Age: 38
End: 2023-08-18

## 2023-08-18 NOTE — TELEPHONE ENCOUNTER
General Call    Contacts         Type Contact Phone/Fax    08/18/2023 04:19 PM CDT Phone (Incoming) Maddy Ortiz (Self) [phone] (M)          Reason for Call:      loratadine (CLARITIN) 10 MG tablet pt wanting this dosage increased. Pt said she needs the dose increased and sent to her nurse. Pt said her nurse has to have it stating the dose is increased to 2, 10mg tablets a day.       Could we send this information to you in [place] or would you prefer to receive a phone call?:   Patient would prefer a phone call   Okay to leave a detailed message?: Yes at Cell number on file:    Telephone Information:   Mobile [phone]       .Danuta Welsh PSC

## 2023-08-21 NOTE — TELEPHONE ENCOUNTER
If she doesn't take Allegra, Zyrtec, Xyzal, or Benadryl, it's ok to increase Claritin (loratadine) 10 mg by mouth twice daily as needed.     Rudy Shin MD

## 2023-08-22 NOTE — TELEPHONE ENCOUNTER
Called and relayed message below. States she is not on any other allergy medications. No questions.     Callie VELASQUEZ RN  Specialty/Allergy Clinics

## 2023-08-27 ENCOUNTER — HEALTH MAINTENANCE LETTER (OUTPATIENT)
Age: 38
End: 2023-08-27

## 2023-09-06 ENCOUNTER — OFFICE VISIT (OUTPATIENT)
Dept: ALLERGY | Facility: OTHER | Age: 38
End: 2023-09-06
Payer: COMMERCIAL

## 2023-09-06 ENCOUNTER — TRANSFERRED RECORDS (OUTPATIENT)
Dept: ALLERGY | Facility: OTHER | Age: 38
End: 2023-09-06

## 2023-09-06 VITALS
HEART RATE: 87 BPM | OXYGEN SATURATION: 96 % | DIASTOLIC BLOOD PRESSURE: 74 MMHG | BODY MASS INDEX: 40.54 KG/M2 | SYSTOLIC BLOOD PRESSURE: 106 MMHG | WEIGHT: 228.84 LBS

## 2023-09-06 DIAGNOSIS — H10.13 ALLERGIC CONJUNCTIVITIS OF BOTH EYES: ICD-10-CM

## 2023-09-06 DIAGNOSIS — R06.02 SHORTNESS OF BREATH: ICD-10-CM

## 2023-09-06 DIAGNOSIS — J30.81 ALLERGIC RHINITIS DUE TO ANIMALS: Primary | ICD-10-CM

## 2023-09-06 DIAGNOSIS — J30.1 SEASONAL ALLERGIC RHINITIS DUE TO POLLEN: ICD-10-CM

## 2023-09-06 LAB
BASOPHILS # BLD AUTO: 0 10E3/UL (ref 0–0.2)
BASOPHILS NFR BLD AUTO: 0 %
EOSINOPHIL # BLD AUTO: 0.3 10E3/UL (ref 0–0.7)
EOSINOPHIL NFR BLD AUTO: 3 %
ERYTHROCYTE [DISTWIDTH] IN BLOOD BY AUTOMATED COUNT: 12.1 % (ref 10–15)
HCT VFR BLD AUTO: 39.4 % (ref 35–47)
HGB BLD-MCNC: 13.5 G/DL (ref 11.7–15.7)
IMM GRANULOCYTES # BLD: 0 10E3/UL
IMM GRANULOCYTES NFR BLD: 0 %
LYMPHOCYTES # BLD AUTO: 2.4 10E3/UL (ref 0.8–5.3)
LYMPHOCYTES NFR BLD AUTO: 24 %
MCH RBC QN AUTO: 29.2 PG (ref 26.5–33)
MCHC RBC AUTO-ENTMCNC: 34.3 G/DL (ref 31.5–36.5)
MCV RBC AUTO: 85 FL (ref 78–100)
MONOCYTES # BLD AUTO: 0.9 10E3/UL (ref 0–1.3)
MONOCYTES NFR BLD AUTO: 9 %
NEUTROPHILS # BLD AUTO: 6.4 10E3/UL (ref 1.6–8.3)
NEUTROPHILS NFR BLD AUTO: 63 %
PLATELET # BLD AUTO: 165 10E3/UL (ref 150–450)
RBC # BLD AUTO: 4.62 10E6/UL (ref 3.8–5.2)
WBC # BLD AUTO: 10.1 10E3/UL (ref 4–11)

## 2023-09-06 PROCEDURE — 85025 COMPLETE CBC W/AUTO DIFF WBC: CPT | Performed by: ALLERGY & IMMUNOLOGY

## 2023-09-06 PROCEDURE — 82785 ASSAY OF IGE: CPT | Performed by: ALLERGY & IMMUNOLOGY

## 2023-09-06 PROCEDURE — 36415 COLL VENOUS BLD VENIPUNCTURE: CPT | Performed by: ALLERGY & IMMUNOLOGY

## 2023-09-06 PROCEDURE — 86003 ALLG SPEC IGE CRUDE XTRC EA: CPT | Performed by: ALLERGY & IMMUNOLOGY

## 2023-09-06 PROCEDURE — 99214 OFFICE O/P EST MOD 30 MIN: CPT | Mod: 25 | Performed by: ALLERGY & IMMUNOLOGY

## 2023-09-06 PROCEDURE — 94010 BREATHING CAPACITY TEST: CPT | Performed by: ALLERGY & IMMUNOLOGY

## 2023-09-06 RX ORDER — ALBUTEROL SULFATE 90 UG/1
2 AEROSOL, METERED RESPIRATORY (INHALATION) EVERY 4 HOURS PRN
Qty: 18 G | Refills: 1 | Status: SHIPPED | OUTPATIENT
Start: 2023-09-06

## 2023-09-06 RX ORDER — LORATADINE 10 MG/1
20 TABLET ORAL DAILY PRN
Qty: 60 TABLET | Refills: 4 | Status: SHIPPED | OUTPATIENT
Start: 2023-09-06 | End: 2024-01-03

## 2023-09-06 RX ORDER — AZELASTINE HYDROCHLORIDE 0.5 MG/ML
1 SOLUTION/ DROPS OPHTHALMIC 2 TIMES DAILY
Qty: 6 ML | Refills: 11 | Status: SHIPPED | OUTPATIENT
Start: 2023-09-06 | End: 2024-01-26

## 2023-09-06 RX ORDER — DILTIAZEM HYDROCHLORIDE 60 MG/1
2 TABLET, FILM COATED ORAL 2 TIMES DAILY
Qty: 10.2 G | Refills: 4 | Status: SHIPPED | OUTPATIENT
Start: 2023-09-06 | End: 2024-01-26

## 2023-09-06 RX ORDER — FLUTICASONE PROPIONATE 50 MCG
2 SPRAY, SUSPENSION (ML) NASAL DAILY
Qty: 15.8 ML | Refills: 11 | Status: SHIPPED | OUTPATIENT
Start: 2023-09-06 | End: 2024-01-26

## 2023-09-06 ASSESSMENT — ENCOUNTER SYMPTOMS
FATIGUE: 0
FEVER: 0
SORE THROAT: 0
LIGHT-HEADEDNESS: 0
DIZZINESS: 1
COUGH: 1
WHEEZING: 0
NAUSEA: 0
CONSTIPATION: 0
HEADACHES: 0
CHEST TIGHTNESS: 0
ACTIVITY CHANGE: 0
CHILLS: 0
EYE DISCHARGE: 0
ROS GI COMMENTS: UMBILICAL HERNIA
RHINORRHEA: 0
EYE ITCHING: 1
SHORTNESS OF BREATH: 1
ABDOMINAL PAIN: 0
EYE REDNESS: 1
SINUS PRESSURE: 0

## 2023-09-06 ASSESSMENT — ASTHMA QUESTIONNAIRES: ACT_TOTALSCORE: 12

## 2023-09-06 NOTE — PATIENT INSTRUCTIONS
-Start intranasal fluticasone (Flonase) 1-2 sprays in each nostril once daily.  -Start azelastine nasal spray, 2 sprays in each nostril twice daily as needed.  Take loratadine 10-20 mg by mouth at bedtime as needed for allergies breakthrough.   Optivar 1 drop in each eye twice daily as needed.       Start Symbicort 2 puffs twice daily. Brush teeth, rinse/gargle/spit water after use   -Start albuterol inhaler 2-4 puffs every 4 hours as needed for chest tightness/wheezing/shortness of breath/persistent cough.  -Use both inhalers with spacer/chamber device.    Set up an appointment with Sleep medicine. You might have obstructive sleep apnea.       Dr Shin Scheduling:  Allergy Appointment line: [phone]    Allergy Shot Room ([place]): [phone]    Pulmonary Function Scheduling:  [place] - [phone]  [place] - [phone]  [place] - [phone]     Prescription Assistance  If you need assistance with your prescriptions (cost, coverage, etc) please contact: [place] [facility] [phone]

## 2023-09-06 NOTE — PROGRESS NOTES
SUBJECTIVE:                                                                   Maddy Ortiz presents today to our Allergy Clinic at [facility] for a follow up visit. She is a 37 year old female with allergic rhinitis and episodes of shortness of breath on exertion.   Percutaneous skin puncture testing for aeroallergens performed in November 2016 showed sensitivity to dog, cat, dust mite, molds, pollen of trees, grass, and weeds. In spring-summer 2018, she had a buildup using cluster schedule for allergen immunotherapy.  She reached maintenance dose in July 2018.  One episode of anaphylaxis on September 8, 2020, due to allergen immunotherapy, Red 1:1, 0.5mL   Was given epi x 2.  She tolerated a lower dose x 1 after that. She stopped taking hydroxyzine.  Take cetirizine 10 mg by mouth once daily.  Sometimes, when symptoms are worse, she may take 20 mg by mouth.  She stopped allergen immunotherapy after September 22, 2020.  She has a history of dyspnea on exertion. She was asked several times if albuterol inhaler was helpful or not, and she was not sure. In November, she had PFT, which showed a mild obstructive/restrictive pattern. There was a questionable postbronchodilator partial reversibility.    Maddy states that she stopped Symbicort because of a metallic taste in her mouth when using an inhaler.  On the other hand, she admits that shortness of breath worsened after she stopped the inhaler.  She also ran out of albuterol.  She develops shortness of breath with exertion when going up and down the stairs, and during walks.  She primarily spends time indoors, so walking has not been a long issue.  She also reports having episodes of gasping for air at night, waking up suddenly, and having difficulty sleeping in general.  She states that she tried a sleep study but needs to schedule it again.    She has been taking loratadine 20 mg by mouth once daily at bedtime and using intranasal  fluticasone 1-2 sprays in each nostril once daily.  Has postnasal drainage that causes cough, and itchy/watery eyes.  Rhinitis symptoms are 60% controlled, and ocular symptoms are 40% controlled.  She does not use any eyedrops.    She is thinking about allergen immunotherapy again; however, she is not sure whether she wants her started considering her previous allergic reaction due to immunotherapy requiring EpiPen x2.      Patient Active Problem List   Diagnosis    Animal dander allergy    CARDIOVASCULAR SCREENING; LDL GOAL LESS THAN 160    Psychosis (H)    Paranoid type delusional disorder (H)    Bipolar 1 disorder (H)    Insomnia    Depression with anxiety    Seasonal allergic rhinitis due to pollen    Allergic rhinitis due to mold    Allergic rhinitis due to animal dander    Allergic rhinitis due to dust mite    Encounter for IUD insertion    Schizoaffective disorder, bipolar type (H)    Gastroesophageal reflux disease without esophagitis    Paranoia (psychosis) (H)    Morbid obesity (H)    Schizoaffective disorder (H)    Umbilical hernia without obstruction and without gangrene    Fatty liver    Type 2 diabetes mellitus without complication, without long-term current use of insulin (H)    Elevated LFTs    Disorganized behavior    Infection due to 2019 novel coronavirus    Polypharmacy    Sedated due to multiple medications    Overdose, undetermined intent, initial encounter    Segmental and somatic dysfunction    SI (sacroiliac) joint dysfunction    mirena IUD 9/30/22       Past Medical History:   Diagnosis Date    Bipolar 1 disorder (H) 12/6/2012    Depressive disorder     Diabetes mellitus, type 2 (H) 12/13/2021    Paranoid type delusional disorder (H) 11/14/2012      *Patient is Adopted           Problem (# of Occurrences) Relation (Name,Age of Onset)    Unknown/Adopted (3) Mother, Father, Other    Hypertension (1) Sister          Past Surgical History:   Procedure Laterality Date    TONSILLECTOMY &  ADENOIDECTOMY      as a child    TONSILLECTOMY & ADENOIDECTOMY       Social History     Socioeconomic History    Marital status: Single     Spouse name: None    Number of children: None    Years of education: None    Highest education level: None   Occupational History     Employer: CURRENTLY UNEMPLOYED AT THIS TIME   Tobacco Use    Smoking status: Former     Packs/day: 0.50     Types: Cigarettes    Smokeless tobacco: Former    Tobacco comments:     around 2nd hand smoke   Vaping Use    Vaping Use: Never used   Substance and Sexual Activity    Alcohol use: Not Currently    Drug use: Not Currently    Sexual activity: Not Currently     Partners: Male     Birth control/protection: Abstinence, None   Other Topics Concern    Parent/sibling w/ CABG, MI or angioplasty before 65F 55M? No   Social History Narrative    One child    Education: some college        09/06/23            ENVIRONMENTAL HISTORY: [place] The home is heated with a electric furnace. They do not have central air conditioning, does have box air conditioner in the wall and has air purifier. The patient's bedroom is furnished with stuffed animals in bed, carpeting in bedroom and fabric window coverings. Pets inside the apartment includes 1 cat. There is history of cockroach or mice infestation. There are no smokers in the house.  The apartment does not have a basement.         Alka De Jesus MA           Review of Systems   Constitutional:  Negative for activity change, chills, fatigue and fever.   HENT:  Positive for postnasal drip. Negative for congestion, nosebleeds, rhinorrhea, sinus pressure, sneezing and sore throat.    Eyes:  Positive for redness and itching. Negative for discharge.   Respiratory:  Positive for cough and shortness of breath. Negative for chest tightness and wheezing.    Cardiovascular:  Negative for chest pain.   Gastrointestinal:  Negative for abdominal pain, constipation and  nausea.        Umbilical hernia   Neurological:  Positive for dizziness. Negative for light-headedness and headaches.           Current Outpatient Medications:     ACCU-CHEK GUIDE test strip, USE TO TEST BLOOD SUGAR 6 TIMES DAILY OR AS DIRECTED, Disp: 600 strip, Rfl: 3    acetaminophen (TYLENOL) 325 MG tablet, Take 2 tablets (650 mg) by mouth every 4 hours as needed for mild pain (to moderate pain), Disp: , Rfl:     albuterol (PROAIR HFA/PROVENTIL HFA/VENTOLIN HFA) 108 (90 Base) MCG/ACT inhaler, Inhale 2 puffs into the lungs every 4 hours as needed for wheezing or shortness of breath, Disp: 18 g, Rfl: 1    azelastine (OPTIVAR) 0.05 % ophthalmic solution, Apply 1 drop to eye 2 times daily, Disp: 6 mL, Rfl: 11    blood glucose (ONETOUCH VERIO IQ) test strip, Use to test blood sugar 2 times daily or as directed., Disp: 100 strip, Rfl: 11    Blood Glucose Monitoring Suppl (ONETOUCH VERIO FLEX SYSTEM) w/Device KIT, 1 each as needed, Disp: 1 kit, Rfl: 0    fluticasone (FLONASE) 50 MCG/ACT nasal spray, Spray 2 sprays into both nostrils daily, Disp: 15.8 mL, Rfl: 11    gabapentin (NEURONTIN) 300 MG capsule, Take 2 capsules (600 mg) by mouth At Bedtime, Disp: 60 capsule, Rfl: 5    ibuprofen (ADVIL/MOTRIN) 200 MG tablet, Take 200 mg by mouth every 4 hours as needed for pain, Disp: , Rfl:     insulin pen needle (32G X 4 MM) 32G X 4 MM miscellaneous, Use 1 pen needles daily with Victoza, Disp: 50 each, Rfl: 0    levonorgestrel (MIRENA) 20 MCG/DAY IUD, 1 each (20 mcg) by Intrauterine route once, Disp: , Rfl:     levothyroxine (SYNTHROID/LEVOTHROID) 50 MCG tablet, Take 1 tablet (50 mcg) by mouth daily before breakfast, Disp: 90 tablet, Rfl: 3    lithium ER (LITHOBID) 300 MG CR tablet, Take 300 mg by mouth, Disp: , Rfl:     loratadine (CLARITIN) 10 MG tablet, Take 2 tablets (20 mg) by mouth daily as needed for allergies, Disp: 60 tablet, Rfl: 4    Melatonin 10 MG TABS tablet, Take 10 mg by mouth, Disp: , Rfl:     OneTouch Delica  Lancets 33G MISC, 1 each 2 times daily, Disp: 100 each, Rfl: 11    risperiDONE microspheres ER (RISPERDAL CONSTA) 50 MG injection, Next due 6/30, Disp: , Rfl:     spacer (OPTICHAMBER APOLINAR) holding chamber, Use with Symbicort and albuterol inhalers as prescribed, Disp: 1 each, Rfl: 0    SYMBICORT 80-4.5 MCG/ACT Inhaler, Inhale 2 puffs into the lungs 2 times daily, Disp: 10.2 g, Rfl: 4    VICTOZA PEN 18 MG/3ML soln, INJECT 1.8 MG UNDER THE SKIN ONCE DAILY, Disp: 9 mL, Rfl: 2    vitamin D3 (CHOLECALCIFEROL) 50 mcg (2000 units) tablet, Take 1 tablet (50 mcg) by mouth daily, Disp: 90 tablet, Rfl: 3    ammonium lactate (LAC-HYDRIN) 12 % external cream, Apply topically 2 times daily as needed for dry skin (Patient not taking: Reported on 9/6/2023), Disp: 385 g, Rfl: 3    ketoconazole (NIZORAL) 2 % external cream, Apply topically 2 times daily X14 days (Patient not taking: Reported on 9/6/2023), Disp: , Rfl:     lamoTRIgine (LAMICTAL) 200 MG tablet, Take 200 mg by mouth At Bedtime Also 25mg in the AM and 25mg at llunch time. (Patient not taking: Reported on 9/6/2023), Disp: , Rfl:     lamoTRIgine (LAMICTAL) 25 MG tablet, TAKE 1 TABLET BY MOUTH TWICE A DAY IN THE MORNING AND AFTERNOON. (Patient not taking: Reported on 7/19/2023), Disp: , Rfl:     risperiDONE (RISPERDAL) 1 MG tablet, Take 1 mg by mouth At Bedtime (Patient not taking: Reported on 9/6/2023), Disp: , Rfl:     triamcinolone (KENALOG) 0.1 % external ointment, Apply topically 2 times daily For up to 2 weeks (Patient not taking: Reported on 9/6/2023), Disp: 30 g, Rfl: 1  Immunization History   Administered Date(s) Administered    COVID-19 Bivalent 12+ (Pfizer) 10/14/2022    COVID-19 MONOVALENT 12+ (Pfizer) 05/18/2021, 06/08/2021, 01/19/2022    DTAP (<7y) 03/01/1985, 06/01/1985, 07/01/1985, 07/01/1986, 07/26/1990    Flu 65+ Years 12/09/2008, 12/06/2012    HPV Quadrivalent 12/12/2008, 09/06/2011    Hepatitis B (Peds <19Y) 02/16/1998    Historical DTP/aP 03/01/1985,  06/01/1985, 07/01/1985, 07/01/1986, 07/26/1990    Historical Hepb 02/16/1998    Influenza (IIV3) PF 12/09/2008, 12/06/2012    Influenza Vaccine >6 months (Alfuria,Fluzone) 11/21/2017, 01/03/2019, 09/17/2019, 09/16/2020, 10/14/2022    MMR 02/27/1986, 07/17/1997    Poliovirus, inactivated (IPV) 06/01/1985, 07/01/1985, 01/01/1987, 07/26/1990    TD,PF 7+ (Tenivac) 07/06/2023    TDAP Vaccine (Adacel) 06/30/1997, 03/31/2008, 05/14/2010, 12/06/2012    Td (Adult), Adsorbed 06/30/1997     Allergies   Allergen Reactions    Dust Mites Shortness Of Breath    Animal Dander      Other reaction(s): *Unknown    Mold      Other reaction(s): Runny Nose    Trees      OBJECTIVE:                                                                 /74 (BP Location: Left arm, Patient Position: Sitting, Cuff Size: Adult Large)   Pulse 87   Wt 103.8 kg (228 lb 13.4 oz)   SpO2 96%   BMI 40.54 kg/m          Physical Exam  Vitals and nursing note reviewed.   Constitutional:       Appearance: She is not diaphoretic.   HENT:      Head: Normocephalic and atraumatic.      Right Ear: Tympanic membrane, ear canal and external ear normal.      Left Ear: Tympanic membrane, ear canal and external ear normal.      Nose: Septal deviation (mild, to the right) and mucosal edema (mild) present. No rhinorrhea.      Right Turbinates: Enlarged (mildly).      Left Turbinates: Enlarged (mildly).      Mouth/Throat:      Mouth: Mucous membranes are moist.      Pharynx: Oropharynx is clear. No oropharyngeal exudate.   Eyes:      General:         Right eye: No discharge.         Left eye: No discharge.      Conjunctiva/sclera:      Right eye: Right conjunctiva is not injected.      Left eye: Left conjunctiva is not injected.   Cardiovascular:      Rate and Rhythm: Normal rate and regular rhythm.      Heart sounds: Normal heart sounds. No murmur heard.  Pulmonary:      Effort: Pulmonary effort is normal. No respiratory distress.      Breath sounds: Normal  breath sounds and air entry. No decreased breath sounds, wheezing, rhonchi or rales.   Musculoskeletal:         General: Normal range of motion.      Cervical back: Normal range of motion.   Skin:     General: Skin is warm.   Neurological:      Mental Status: She is oriented to person, place, and time.   Psychiatric:         Mood and Affect: Affect is flat.         Behavior: Behavior normal.      Comments: Pressured speech.  Poor eye contact         WORKUP:   SPIROMETRY       FVC 2.85L (93% of predicted).     FEV1 2.41L (96% of predicted).     FEV1/FVC 85%      I have reviewed and interpreted these results. My interpretation:The office spirometry performed today doesn't suggest an obstruction.     Asthma Control Test (ACT) total score: 12           ASSESSMENT/PLAN:    Shortness of breath    Shortness of breath could be due to deconditioning or even umbilical hernia.  On the other hand, her symptoms got worse after she stopped Symbicort.  - Restart Symbicort 80/4.5 mcg 2 puffs twice daily.  She will brush her teeth right after using it to prevent metallic taste.  She will also rinse/gargle/spit water after using it.  - She will use an albuterol inhaler 2 to 4 puffs every 4 hours as needed for persistent cough/chest tightness/wheezing/shortness of breath.  - I encouraged the patient to follow up with a sleep medicine specialist to evaluate for possible obstructive sleep apnea, considering nocturnal symptoms of gasping for air.    - BREATHING CAPACITY TEST [82803]  - albuterol (PROAIR HFA/PROVENTIL HFA/VENTOLIN HFA) 108 (90 Base) MCG/ACT inhaler  Dispense: 18 g; Refill: 1  - SYMBICORT 80-4.5 MCG/ACT Inhaler  Dispense: 10.2 g; Refill: 4  - CBC with Platelets & Differential      Allergic rhinitis due to animals  Seasonal allergic rhinitis due to pollen  Allergic conjunctivitis of both eyes    Symptoms are fairly well controlled, but there is still room for improvement.  - Continue to nasal fluticasone 1-2 sprays in each  nostril once daily.  - Continue loratadine 10-20 mg by mouth nightly as needed.  - Use azelastine 2 sprays in each nostril twice daily as needed.  - Use Optivar 1 drop in each eye twice daily as needed.    Ordered interval serum IgE for aeroallergens to understand better her current culprits.  We will consider allergen immunotherapy as a last resort, considering previous history of reactions and mental health issues.    - loratadine (CLARITIN) 10 MG tablet  Dispense: 60 tablet; Refill: 4  - fluticasone (FLONASE) 50 MCG/ACT nasal spray  Dispense: 15.8 mL; Refill: 11  - azelastine (OPTIVAR) 0.05 % ophthalmic solution  Dispense: 6 mL; Refill: 11    - Allergen cat epithellium IgE  - Allergen dog epithelium IgE  - Allergen Rogerio grass IgE  - Allergen orchard grass IgE  - Allergen kayode IgE  - Allergen D farinae IgE  - Allergen D pteronyssinus IgE  - Allergen alternaria alternata IgE  - Allergen aspergillus fumigatus IgE  - Allergen cladosporium herbarum IgE  - Allergen Epicoccum purpurascens IgE  - Allergen penicillium notatum IgE  - Allergen ronald white IgE  - Allergen Cedar IgE  - Allergen cottonwood IgE  - Allergen elm IgE  - Allergen maple box elder IgE  - Allergen oak white IgE  - Allergen Red [place]on IgE  - Allergen silver  birch IgE  - Allergen Tree White [place] IgE  - Allergen [place] Tree  - Allergen white pine IgE  - Allergen English plantain IgE  - Allergen giant ragweed IgE  - Allergen lamb's quarter IgE  - Allergen Mugwort IgE  - Allergen ragweed short IgE  - Allergen Sagebrush Wormwood IgE  - Allergen Sheep Sorrel IgE  - Allergen thistle Russian IgE  - Allergen Weed Nettle IgE  - Allergen, Kochia/Firebush  - IgE    Follow-up in 2 months or sooner if needed.         Thank you for allowing us to participate in the care of this patient. Please feel free to contact us if there are any questions or concerns about the patient.    Disclaimer: This note consists of symbols derived from keyboarding, dictation  and/or voice recognition software. As a result, there may be errors in the script that have gone undetected. Please consider this when interpreting information found in this chart.    Rudy Shin MD, FAAAAI, FACAAI  Allergy, Asthma and Immunology     [facility]

## 2023-09-06 NOTE — LETTER
9/6/2023         RE: Maddy Ortiz  [address]  [facility] [address]        Dear Colleague,    Thank you for referring your patient, Maddy Ortiz, to the [facility]. Please see a copy of my visit note below.    SUBJECTIVE:                                                                   Maddy Ortiz presents today to our Allergy Clinic at [facility] for a follow up visit. She is a 37 year old female with allergic rhinitis and episodes of shortness of breath on exertion.   Percutaneous skin puncture testing for aeroallergens performed in November 2016 showed sensitivity to dog, cat, dust mite, molds, pollen of trees, grass, and weeds. In spring-summer 2018, she had a buildup using cluster schedule for allergen immunotherapy.  She reached maintenance dose in July 2018.  One episode of anaphylaxis on September 8, 2020, due to allergen immunotherapy, Red 1:1, 0.5mL   Was given epi x 2.  She tolerated a lower dose x 1 after that. She stopped taking hydroxyzine.  Take cetirizine 10 mg by mouth once daily.  Sometimes, when symptoms are worse, she may take 20 mg by mouth.  She stopped allergen immunotherapy after September 22, 2020.  She has a history of dyspnea on exertion. She was asked several times if albuterol inhaler was helpful or not, and she was not sure. In November, she had PFT, which showed a mild obstructive/restrictive pattern. There was a questionable postbronchodilator partial reversibility.    Maddy states that she stopped Symbicort because of a metallic taste in her mouth when using an inhaler.  On the other hand, she admits that shortness of breath worsened after she stopped the inhaler.  She also ran out of albuterol.  She develops shortness of breath with exertion when going up and down the stairs, and during walks.  She primarily spends time indoors, so walking has not been a long issue.  She also reports having episodes of  gasping for air at night, waking up suddenly, and having difficulty sleeping in general.  She states that she tried a sleep study but needs to schedule it again.    She has been taking loratadine 20 mg by mouth once daily at bedtime and using intranasal fluticasone 1-2 sprays in each nostril once daily.  Has postnasal drainage that causes cough, and itchy/watery eyes.  Rhinitis symptoms are 60% controlled, and ocular symptoms are 40% controlled.  She does not use any eyedrops.    She is thinking about allergen immunotherapy again; however, she is not sure whether she wants her started considering her previous allergic reaction due to immunotherapy requiring EpiPen x2.      Patient Active Problem List   Diagnosis     Animal dander allergy     CARDIOVASCULAR SCREENING; LDL GOAL LESS THAN 160     Psychosis (H)     Paranoid type delusional disorder (H)     Bipolar 1 disorder (H)     Insomnia     Depression with anxiety     Seasonal allergic rhinitis due to pollen     Allergic rhinitis due to mold     Allergic rhinitis due to animal dander     Allergic rhinitis due to dust mite     Encounter for IUD insertion     Schizoaffective disorder, bipolar type (H)     Gastroesophageal reflux disease without esophagitis     Paranoia (psychosis) (H)     Morbid obesity (H)     Schizoaffective disorder (H)     Umbilical hernia without obstruction and without gangrene     Fatty liver     Type 2 diabetes mellitus without complication, without long-term current use of insulin (H)     Elevated LFTs     Disorganized behavior     Infection due to 2019 novel coronavirus     Polypharmacy     Sedated due to multiple medications     Overdose, undetermined intent, initial encounter     Segmental and somatic dysfunction     SI (sacroiliac) joint dysfunction     mirena IUD 9/30/22       Past Medical History:   Diagnosis Date     Bipolar 1 disorder (H) 12/6/2012     Depressive disorder      Diabetes mellitus, type 2 (H) 12/13/2021     Paranoid  type delusional disorder (H) 11/14/2012      *Patient is Adopted           Problem (# of Occurrences) Relation (Name,Age of Onset)    Unknown/Adopted (3) Mother, Father, Other    Hypertension (1) Sister          Past Surgical History:   Procedure Laterality Date     TONSILLECTOMY & ADENOIDECTOMY      as a child     TONSILLECTOMY & ADENOIDECTOMY       Social History     Socioeconomic History     Marital status: Single     Spouse name: None     Number of children: None     Years of education: None     Highest education level: None   Occupational History     Employer: CURRENTLY UNEMPLOYED AT THIS TIME   Tobacco Use     Smoking status: Former     Packs/day: 0.50     Types: Cigarettes     Smokeless tobacco: Former     Tobacco comments:     around 2nd hand smoke   Vaping Use     Vaping Use: Never used   Substance and Sexual Activity     Alcohol use: Not Currently     Drug use: Not Currently     Sexual activity: Not Currently     Partners: Male     Birth control/protection: Abstinence, None   Other Topics Concern     Parent/sibling w/ CABG, MI or angioplasty before 65F 55M? No   Social History Narrative    One child    Education: some college        09/06/23            ENVIRONMENTAL HISTORY: [place] The home is heated with a electric furnace. They do not have central air conditioning, does have box air conditioner in the wall and has air purifier. The patient's bedroom is furnished with stuffed animals in bed, carpeting in bedroom and fabric window coverings. Pets inside the apartment includes 1 cat. There is history of cockroach or mice infestation. There are no smokers in the house.  The apartment does not have a basement.         Alka De Jesus MA           Review of Systems   Constitutional:  Negative for activity change, chills, fatigue and fever.   HENT:  Positive for postnasal drip. Negative for congestion, nosebleeds, rhinorrhea, sinus pressure, sneezing and  sore throat.    Eyes:  Positive for redness and itching. Negative for discharge.   Respiratory:  Positive for cough and shortness of breath. Negative for chest tightness and wheezing.    Cardiovascular:  Negative for chest pain.   Gastrointestinal:  Negative for abdominal pain, constipation and nausea.        Umbilical hernia   Neurological:  Positive for dizziness. Negative for light-headedness and headaches.           Current Outpatient Medications:      ACCU-CHEK GUIDE test strip, USE TO TEST BLOOD SUGAR 6 TIMES DAILY OR AS DIRECTED, Disp: 600 strip, Rfl: 3     acetaminophen (TYLENOL) 325 MG tablet, Take 2 tablets (650 mg) by mouth every 4 hours as needed for mild pain (to moderate pain), Disp: , Rfl:      albuterol (PROAIR HFA/PROVENTIL HFA/VENTOLIN HFA) 108 (90 Base) MCG/ACT inhaler, Inhale 2 puffs into the lungs every 4 hours as needed for wheezing or shortness of breath, Disp: 18 g, Rfl: 1     azelastine (OPTIVAR) 0.05 % ophthalmic solution, Apply 1 drop to eye 2 times daily, Disp: 6 mL, Rfl: 11     blood glucose (ONETOUCH VERIO IQ) test strip, Use to test blood sugar 2 times daily or as directed., Disp: 100 strip, Rfl: 11     Blood Glucose Monitoring Suppl (ONETOUCH VERIO FLEX SYSTEM) w/Device KIT, 1 each as needed, Disp: 1 kit, Rfl: 0     fluticasone (FLONASE) 50 MCG/ACT nasal spray, Spray 2 sprays into both nostrils daily, Disp: 15.8 mL, Rfl: 11     gabapentin (NEURONTIN) 300 MG capsule, Take 2 capsules (600 mg) by mouth At Bedtime, Disp: 60 capsule, Rfl: 5     ibuprofen (ADVIL/MOTRIN) 200 MG tablet, Take 200 mg by mouth every 4 hours as needed for pain, Disp: , Rfl:      insulin pen needle (32G X 4 MM) 32G X 4 MM miscellaneous, Use 1 pen needles daily with Victoza, Disp: 50 each, Rfl: 0     levonorgestrel (MIRENA) 20 MCG/DAY IUD, 1 each (20 mcg) by Intrauterine route once, Disp: , Rfl:      levothyroxine (SYNTHROID/LEVOTHROID) 50 MCG tablet, Take 1 tablet (50 mcg) by mouth daily before breakfast, Disp: 90  tablet, Rfl: 3     lithium ER (LITHOBID) 300 MG CR tablet, Take 300 mg by mouth, Disp: , Rfl:      loratadine (CLARITIN) 10 MG tablet, Take 2 tablets (20 mg) by mouth daily as needed for allergies, Disp: 60 tablet, Rfl: 4     Melatonin 10 MG TABS tablet, Take 10 mg by mouth, Disp: , Rfl:      OneTouch Delica Lancets 33G MISC, 1 each 2 times daily, Disp: 100 each, Rfl: 11     risperiDONE microspheres ER (RISPERDAL CONSTA) 50 MG injection, Next due 6/30, Disp: , Rfl:      spacer (OPTICHAMBER APOLINAR) holding chamber, Use with Symbicort and albuterol inhalers as prescribed, Disp: 1 each, Rfl: 0     SYMBICORT 80-4.5 MCG/ACT Inhaler, Inhale 2 puffs into the lungs 2 times daily, Disp: 10.2 g, Rfl: 4     VICTOZA PEN 18 MG/3ML soln, INJECT 1.8 MG UNDER THE SKIN ONCE DAILY, Disp: 9 mL, Rfl: 2     vitamin D3 (CHOLECALCIFEROL) 50 mcg (2000 units) tablet, Take 1 tablet (50 mcg) by mouth daily, Disp: 90 tablet, Rfl: 3     ammonium lactate (LAC-HYDRIN) 12 % external cream, Apply topically 2 times daily as needed for dry skin (Patient not taking: Reported on 9/6/2023), Disp: 385 g, Rfl: 3     ketoconazole (NIZORAL) 2 % external cream, Apply topically 2 times daily X14 days (Patient not taking: Reported on 9/6/2023), Disp: , Rfl:      lamoTRIgine (LAMICTAL) 200 MG tablet, Take 200 mg by mouth At Bedtime Also 25mg in the AM and 25mg at llunch time. (Patient not taking: Reported on 9/6/2023), Disp: , Rfl:      lamoTRIgine (LAMICTAL) 25 MG tablet, TAKE 1 TABLET BY MOUTH TWICE A DAY IN THE MORNING AND AFTERNOON. (Patient not taking: Reported on 7/19/2023), Disp: , Rfl:      risperiDONE (RISPERDAL) 1 MG tablet, Take 1 mg by mouth At Bedtime (Patient not taking: Reported on 9/6/2023), Disp: , Rfl:      triamcinolone (KENALOG) 0.1 % external ointment, Apply topically 2 times daily For up to 2 weeks (Patient not taking: Reported on 9/6/2023), Disp: 30 g, Rfl: 1  Immunization History   Administered Date(s) Administered     COVID-19 Bivalent  12+ (Pfizer) 10/14/2022     COVID-19 MONOVALENT 12+ (Pfizer) 05/18/2021, 06/08/2021, 01/19/2022     DTAP (<7y) 03/01/1985, 06/01/1985, 07/01/1985, 07/01/1986, 07/26/1990     Flu 65+ Years 12/09/2008, 12/06/2012     HPV Quadrivalent 12/12/2008, 09/06/2011     Hepatitis B (Peds <19Y) 02/16/1998     Historical DTP/aP 03/01/1985, 06/01/1985, 07/01/1985, 07/01/1986, 07/26/1990     Historical Hepb 02/16/1998     Influenza (IIV3) PF 12/09/2008, 12/06/2012     Influenza Vaccine >6 months (Alfuria,Fluzone) 11/21/2017, 01/03/2019, 09/17/2019, 09/16/2020, 10/14/2022     MMR 02/27/1986, 07/17/1997     Poliovirus, inactivated (IPV) 06/01/1985, 07/01/1985, 01/01/1987, 07/26/1990     TD,PF 7+ (Tenivac) 07/06/2023     TDAP Vaccine (Adacel) 06/30/1997, 03/31/2008, 05/14/2010, 12/06/2012     Td (Adult), Adsorbed 06/30/1997     Allergies   Allergen Reactions     Dust Mites Shortness Of Breath     Animal Dander      Other reaction(s): *Unknown     Mold      Other reaction(s): Runny Nose     Trees      OBJECTIVE:                                                                 /74 (BP Location: Left arm, Patient Position: Sitting, Cuff Size: Adult Large)   Pulse 87   Wt 103.8 kg (228 lb 13.4 oz)   SpO2 96%   BMI 40.54 kg/m          Physical Exam  Vitals and nursing note reviewed.   Constitutional:       Appearance: She is not diaphoretic.   HENT:      Head: Normocephalic and atraumatic.      Right Ear: Tympanic membrane, ear canal and external ear normal.      Left Ear: Tympanic membrane, ear canal and external ear normal.      Nose: Septal deviation (mild, to the right) and mucosal edema (mild) present. No rhinorrhea.      Right Turbinates: Enlarged (mildly).      Left Turbinates: Enlarged (mildly).      Mouth/Throat:      Mouth: Mucous membranes are moist.      Pharynx: Oropharynx is clear. No oropharyngeal exudate.   Eyes:      General:         Right eye: No discharge.         Left eye: No discharge.      Conjunctiva/sclera:       Right eye: Right conjunctiva is not injected.      Left eye: Left conjunctiva is not injected.   Cardiovascular:      Rate and Rhythm: Normal rate and regular rhythm.      Heart sounds: Normal heart sounds. No murmur heard.  Pulmonary:      Effort: Pulmonary effort is normal. No respiratory distress.      Breath sounds: Normal breath sounds and air entry. No decreased breath sounds, wheezing, rhonchi or rales.   Musculoskeletal:         General: Normal range of motion.      Cervical back: Normal range of motion.   Skin:     General: Skin is warm.   Neurological:      Mental Status: She is oriented to person, place, and time.   Psychiatric:         Mood and Affect: Affect is flat.         Behavior: Behavior normal.      Comments: Pressured speech.  Poor eye contact         WORKUP:   SPIROMETRY       FVC 2.85L (93% of predicted).     FEV1 2.41L (96% of predicted).     FEV1/FVC 85%      I have reviewed and interpreted these results. My interpretation:The office spirometry performed today doesn't suggest an obstruction.     Asthma Control Test (ACT) total score: 12           ASSESSMENT/PLAN:    Shortness of breath    Shortness of breath could be due to deconditioning or even umbilical hernia.  On the other hand, her symptoms got worse after she stopped Symbicort.  - Restart Symbicort 80/4.5 mcg 2 puffs twice daily.  She will brush her teeth right after using it to prevent metallic taste.  She will also rinse/gargle/spit water after using it.  - She will use an albuterol inhaler 2 to 4 puffs every 4 hours as needed for persistent cough/chest tightness/wheezing/shortness of breath.  - I encouraged the patient to follow up with a sleep medicine specialist to evaluate for possible obstructive sleep apnea, considering nocturnal symptoms of gasping for air.    - BREATHING CAPACITY TEST [[id number]]  - albuterol (PROAIR HFA/PROVENTIL HFA/VENTOLIN HFA) 108 (90 Base) MCG/ACT inhaler  Dispense: 18 g; Refill: 1  - SYMBICORT 80-4.5  MCG/ACT Inhaler  Dispense: 10.2 g; Refill: 4  - CBC with Platelets & Differential      Allergic rhinitis due to animals  Seasonal allergic rhinitis due to pollen  Allergic conjunctivitis of both eyes    Symptoms are fairly well controlled, but there is still room for improvement.  - Continue to nasal fluticasone 1-2 sprays in each nostril once daily.  - Continue loratadine 10-20 mg by mouth nightly as needed.  - Use azelastine 2 sprays in each nostril twice daily as needed.  - Use Optivar 1 drop in each eye twice daily as needed.    Ordered interval serum IgE for aeroallergens to understand better her current culprits.  We will consider allergen immunotherapy as a last resort, considering previous history of reactions and mental health issues.    - loratadine (CLARITIN) 10 MG tablet  Dispense: 60 tablet; Refill: 4  - fluticasone (FLONASE) 50 MCG/ACT nasal spray  Dispense: 15.8 mL; Refill: 11  - azelastine (OPTIVAR) 0.05 % ophthalmic solution  Dispense: 6 mL; Refill: 11    - Allergen cat epithellium IgE  - Allergen dog epithelium IgE  - Allergen Rogerio grass IgE  - Allergen orchard grass IgE  - Allergen kayode IgE  - Allergen D farinae IgE  - Allergen D pteronyssinus IgE  - Allergen alternaria alternata IgE  - Allergen aspergillus fumigatus IgE  - Allergen cladosporium herbarum IgE  - Allergen Epicoccum purpurascens IgE  - Allergen penicillium notatum IgE  - Allergen ronald white IgE  - Allergen Cedar IgE  - Allergen cottonwood IgE  - Allergen elm IgE  - Allergen maple box elder IgE  - Allergen oak white IgE  - Allergen [place] IgE  - Allergen silver  birch IgE  - Allergen Tree [place] IgE  - Allergen Boston Tree  - Allergen white pine IgE  - Allergen English plantain IgE  - Allergen giant ragweed IgE  - Allergen lamb's quarter IgE  - Allergen Mugwort IgE  - Allergen ragweed short IgE  - Allergen Sagebrush Wormwood IgE  - Allergen Sheep Sorrel IgE  - Allergen thistle Russian IgE  - Allergen Weed Nettle  IgE  - Allergen, Kochia/Firebush  - IgE    Follow-up in 2 months or sooner if needed.         Thank you for allowing us to participate in the care of this patient. Please feel free to contact us if there are any questions or concerns about the patient.    Disclaimer: This note consists of symbols derived from keyboarding, dictation and/or voice recognition software. As a result, there may be errors in the script that have gone undetected. Please consider this when interpreting information found in this chart.    Rudy Shin MD, FAAAAI, FACAAI  Allergy, Asthma and Immunology     [facility]        Again, thank you for allowing me to participate in the care of your patient.        Sincerely,        Rudy Sihn MD

## 2023-09-07 LAB
A ALTERNATA IGE QN: 4.19 KU(A)/L
A FUMIGATUS IGE QN: 1.04 KU(A)/L
C HERBARUM IGE QN: 1.89 KU(A)/L
CAT DANDER IGG QN: 20 KU(A)/L
CEDAR IGE QN: <0.1 KU(A)/L
COTTONWOOD IGE QN: 0.18 KU(A)/L
D FARINAE IGE QN: <0.1 KU(A)/L
D PTERONYSS IGE QN: <0.1 KU(A)/L
DOG DANDER+EPITH IGE QN: 3.08 KU(A)/L
E PURPURASCENS IGE QN: 3.92 KU(A)/L
EAST WHITE PINE IGE QN: <0.1 KU(A)/L
ENGL PLANTAIN IGE QN: 0.1 KU(A)/L
FIREBUSH IGE QN: <0.1 KU(A)/L
GIANT RAGWEED IGE QN: <0.1 KU(A)/L
IGE SERPL-ACNC: 157 KU/L (ref 0–114)
JOHNSON GRASS IGE QN: <0.1 KU(A)/L
WHITE ASH IGE QN: 0.36 KU(A)/L
WHITE ELM IGE QN: 0.31 KU(A)/L

## 2023-09-08 LAB
CALIF WALNUT POLN IGE QN: 0.31 KU(A)/L
COCKSFOOT IGE QN: <0.1 KU(A)/L
COMMON RAGWEED IGE QN: 0.29 KU(A)/L
GOOSEFOOT IGE QN: <0.1 KU(A)/L
MAPLE IGE QN: 0.39 KU(A)/L
MUGWORT IGE QN: 0.16 KU(A)/L
NETTLE IGE QN: 0.27 KU(A)/L
P NOTATUM IGE QN: 0.33 KU(A)/L
RED MULBERRY IGE QN: <0.1 KU(A)/L
SALTWORT IGE QN: <0.1 KU(A)/L
SHEEP SORREL IGE QN: <0.1 KU(A)/L
SILVER BIRCH IGE QN: 2.39 KU(A)/L
TIMOTHY IGE QN: 0.13 KU(A)/L
WHITE MULBERRY IGE QN: <0.1 KU(A)/L
WHITE OAK IGE QN: 0.78 KU(A)/L
WORMWOOD IGE QN: 0.13 KU(A)/L

## 2023-09-12 DIAGNOSIS — E11.9 TYPE 2 DIABETES MELLITUS WITHOUT COMPLICATION, WITHOUT LONG-TERM CURRENT USE OF INSULIN (H): ICD-10-CM

## 2023-09-12 NOTE — RESULT ENCOUNTER NOTE
Writer's Bloqt message sent:   CBC with differential is within normal limits.  Absolute eosinophil count 300.  Elevated total serum IgE, which is not uncommon for the patients with allergic rhinitis, asthma, food allergies, and/or eczema.    Serum IgE for regional aeroallergen panel with sensitivity to cat, dog, molds, tree pollen, and weed pollen.  - Continue avoidance measures.      - No changes in the previously discussed medication treatment plan.

## 2023-09-15 RX ORDER — LIRAGLUTIDE 6 MG/ML
INJECTION SUBCUTANEOUS
Qty: 9 ML | Refills: 3 | Status: SHIPPED | OUTPATIENT
Start: 2023-09-15 | End: 2024-01-18

## 2023-09-18 ENCOUNTER — VIRTUAL VISIT (OUTPATIENT)
Dept: SURGERY | Facility: CLINIC | Age: 38
End: 2023-09-18
Payer: COMMERCIAL

## 2023-09-18 DIAGNOSIS — E66.9 OBESITY (BMI 30-39.9): ICD-10-CM

## 2023-09-18 DIAGNOSIS — E11.9 TYPE 2 DIABETES MELLITUS WITHOUT COMPLICATION, WITHOUT LONG-TERM CURRENT USE OF INSULIN (H): Primary | ICD-10-CM

## 2023-09-18 PROCEDURE — 97803 MED NUTRITION INDIV SUBSEQ: CPT | Mod: VID | Performed by: DIETITIAN, REGISTERED

## 2023-09-18 NOTE — LETTER
9/18/2023         RE: Maddy Ortiz  [address]  [facility] [address]        Dear Colleague,    Thank you for referring your patient, Maddy Ortiz, to the [facility]. Please see a copy of my visit note below.    Maddy Ortiz is a 37 year old who is being evaluated via a billable video visit.      How would you like to obtain your AVS? MyChart  If the video visit is dropped, the invitation should be resent by: Send to e-mail at: [email or web address]  Will anyone else be joining your video visit? No    Medical  Weight Loss Follow-Up Diet Evaluation  Assessment:  Maddy is presenting today for a follow up weight management nutrition consultation. Pt has had an initial appointment with Dr. Serna  Personal goals: lose weight to have umbilical hernia fixed  Weight loss medication:  victoza  Pt's weight is 228 lb   Initial weight: 202 lb  Weight change: down 32 lbs from high of 253 lbs        5/28/2022     4:35 AM   Changes and Difficulties   I have made the following changes to my diet since my last visit: meal planning   I have made the following changes to my activity/exercise since my last visit: active physical activity   With regards to my activity/exercise, I am still struggling with: rest - tend to work hard 1 week rest the next week     BMI: There is no height or weight on file to calculate BMI.  Ideal body weight: 52.4 kg (115 lb 8.3 oz)  Adjusted ideal body weight: 74.3 kg (163 lb 11.4 oz)    Estimated RMR ([place]-St Jeor equation):   1727 kcals x 1.2 (sedentary) = 2072 kcals (for weight maintenance)  Recommended Protein Intake: 60-80 grams of protein/day  Patient Active Problem List:  Patient Active Problem List   Diagnosis     Animal dander allergy     CARDIOVASCULAR SCREENING; LDL GOAL LESS THAN 160     Psychosis (H)     Paranoid type delusional disorder (H)     Bipolar 1 disorder (H)     Insomnia     Depression with anxiety      Seasonal allergic rhinitis due to pollen     Allergic rhinitis due to mold     Allergic rhinitis due to animal dander     Allergic rhinitis due to dust mite     Encounter for IUD insertion     Schizoaffective disorder, bipolar type (H)     Gastroesophageal reflux disease without esophagitis     Paranoia (psychosis) (H)     Morbid obesity (H)     Schizoaffective disorder (H)     Umbilical hernia without obstruction and without gangrene     Fatty liver     Type 2 diabetes mellitus without complication, without long-term current use of insulin (H)     Elevated LFTs     Disorganized behavior     Infection due to 2019 novel coronavirus     Polypharmacy     Sedated due to multiple medications     Overdose, undetermined intent, initial encounter     Segmental and somatic dysfunction     SI (sacroiliac) joint dysfunction     mirena IUD 9/30/22     Diabetes: reports blood glucose running around 100- not checking regularly  Most recent A1c 6.4 in June  Progress on goals from last visit:  Weight is up about 7 lbs from last visit a couple months ago. She is still struggling with hunger and sometimes skips lunch, then will eat cheezits.  -Gets Mom's meals as well and goes to food shelf  Talk to daughter about groceries and meal planning  Eat breakfast daily/3 meals per day - mostly met  Continue to increase veggies - met, two veggies per day  Exercising by biking - Exercise video on youtube     Dietary Recall:  Breakfast: Mom's Meal usually  Lunch: chicken and tuna for lunch or sometimes skips and will eat cheezits  Dinner: Mom's Meals  Beverages:   water  Diet Soda  Exercise: not discussed today  Nutrition Diagnosis:    Obesity related to overeating and poor lifestyle habits as evidenced by patient's report of limited budget, inconsistent meals, large portions, frequent snacking of sweets, lack of activity and BMI 39.15    Intervention:  Food and/or nutrient delivery: continue to aim for 3 meals per day, avoid skipping  meals  Nutrition counseling: discussed meal ideas/options for lunch, goal setting    Monitoring/Evaluation:    Goals:  Eat breakfast daily/3 meals per day  Continue to have 2 servings of veggies daily  Exercising by youtube videos at home    Patient to follow up in 2 month(s) with EL[facility]    Video-Visit Details    Type of service:  Video Visit    Video Start Time (time video started): 1:33 pm    Video End Time (time video stopped): 1:51 am    Originating Location (pt. Location): Home        Distant Location (provider location):  On-site    Mode of Communication:  Video Conference via [facility]    Physician has received verbal consent for a Video Visit from the patient? Yes      Angeli Domingo      Again, thank you for allowing me to participate in the care of your patient.        Sincerely,        Angeli Domingo RD

## 2023-09-18 NOTE — PROGRESS NOTES
Maddy Ortiz is a 37 year old who is being evaluated via a billable video visit.      How would you like to obtain your AVS? MyChart  If the video visit is dropped, the invitation should be resent by: Send to e-mail at: [email or web address]  Will anyone else be joining your video visit? No    Medical  Weight Loss Follow-Up Diet Evaluation  Assessment:  Maddy is presenting today for a follow up weight management nutrition consultation. Pt has had an initial appointment with Dr. Serna  Personal goals: lose weight to have umbilical hernia fixed  Weight loss medication:  victoza  Pt's weight is 228 lb   Initial weight: 202 lb  Weight change: down 32 lbs from high of 253 lbs        5/28/2022     4:35 AM   Changes and Difficulties   I have made the following changes to my diet since my last visit: meal planning   I have made the following changes to my activity/exercise since my last visit: active physical activity   With regards to my activity/exercise, I am still struggling with: rest - tend to work hard 1 week rest the next week     BMI: There is no height or weight on file to calculate BMI.  Ideal body weight: 52.4 kg (115 lb 8.3 oz)  Adjusted ideal body weight: 74.3 kg (163 lb 11.4 oz)    Estimated RMR ([place]-St Jeor equation):   1727 kcals x 1.2 (sedentary) = 2072 kcals (for weight maintenance)  Recommended Protein Intake: 60-80 grams of protein/day  Patient Active Problem List:  Patient Active Problem List   Diagnosis    Animal dander allergy    CARDIOVASCULAR SCREENING; LDL GOAL LESS THAN 160    Psychosis (H)    Paranoid type delusional disorder (H)    Bipolar 1 disorder (H)    Insomnia    Depression with anxiety    Seasonal allergic rhinitis due to pollen    Allergic rhinitis due to mold    Allergic rhinitis due to animal dander    Allergic rhinitis due to dust mite    Encounter for IUD insertion    Schizoaffective disorder, bipolar type (H)    Gastroesophageal reflux disease without esophagitis    Paranoia  (psychosis) (H)    Morbid obesity (H)    Schizoaffective disorder (H)    Umbilical hernia without obstruction and without gangrene    Fatty liver    Type 2 diabetes mellitus without complication, without long-term current use of insulin (H)    Elevated LFTs    Disorganized behavior    Infection due to 2019 novel coronavirus    Polypharmacy    Sedated due to multiple medications    Overdose, undetermined intent, initial encounter    Segmental and somatic dysfunction    SI (sacroiliac) joint dysfunction    mirena IUD 9/30/22     Diabetes: reports blood glucose running around 100- not checking regularly  Most recent A1c 6.4 in June  Progress on goals from last visit:  Weight is up about 7 lbs from last visit a couple months ago. She is still struggling with hunger and sometimes skips lunch, then will eat cheezits.  -Gets Mom's meals as well and goes to food shelf  Talk to daughter about groceries and meal planning  Eat breakfast daily/3 meals per day - mostly met  Continue to increase veggies - met, two veggies per day  Exercising by biking - Exercise video on [employer]     Dietary Recall:  Breakfast: Mom's Meal usually  Lunch: chicken and tuna for lunch or sometimes skips and will eat cheezits  Dinner: Mom's Meals  Beverages:   water  Diet Soda  Exercise: not discussed today  Nutrition Diagnosis:    Obesity related to overeating and poor lifestyle habits as evidenced by patient's report of limited budget, inconsistent meals, large portions, frequent snacking of sweets, lack of activity and BMI 39.15    Intervention:  Food and/or nutrient delivery: continue to aim for 3 meals per day, avoid skipping meals  Nutrition counseling: discussed meal ideas/options for lunch, goal setting    Monitoring/Evaluation:    Goals:  Eat breakfast daily/3 meals per day  Continue to have 2 servings of veggies daily  Exercising by youtube videos at home    Patient to follow up in 2 month(s) with RD    Video-Visit Details    Type of service:   Video Visit    Video Start Time (time video started): 1:33 pm    Video End Time (time video stopped): 1:51 am    Originating Location (pt. Location): Home        Distant Location (provider location):  On-site    Mode of Communication:  Video Conference via [facility]    Physician has received verbal consent for a Video Visit from the patient? Yes      Angeli Domingo

## 2023-09-20 ENCOUNTER — TELEPHONE (OUTPATIENT)
Dept: FAMILY MEDICINE | Facility: CLINIC | Age: 38
End: 2023-09-20
Payer: COMMERCIAL

## 2023-09-20 DIAGNOSIS — M25.561 CHRONIC PAIN OF BOTH KNEES: ICD-10-CM

## 2023-09-20 DIAGNOSIS — M25.551 HIP PAIN, RIGHT: ICD-10-CM

## 2023-09-20 DIAGNOSIS — M25.562 CHRONIC PAIN OF BOTH KNEES: ICD-10-CM

## 2023-09-20 DIAGNOSIS — R32 URINARY INCONTINENCE, UNSPECIFIED TYPE: ICD-10-CM

## 2023-09-20 DIAGNOSIS — G89.29 CHRONIC PAIN OF BOTH KNEES: ICD-10-CM

## 2023-09-20 DIAGNOSIS — M54.2 NECK PAIN: Primary | ICD-10-CM

## 2023-09-20 NOTE — TELEPHONE ENCOUNTER
I placed 2 different physical therapy orders.  1 for her musculoskeletal complaints, and the second 1 for her incontinence.  Shirley Freeman, CNP

## 2023-09-20 NOTE — TELEPHONE ENCOUNTER
Called & left detailed msg on pt identified voicemail & read providers message. Pt was given number to call for appt's. Was told to call clinic with any further questions/concerns.    Jeniffer Cabello RN

## 2023-09-20 NOTE — TELEPHONE ENCOUNTER
Pt calls & states she would like a referral to PT for the following:  Neck pain, right hip pain, knee pain L>R & incontinence.  Pt states she has talked about these issues with PCP. States has seen PT  in the past for incontinence (last referral 4/26/21)    Pt trying to do PT rather than see a chiropractor.    Noted last PT referral placed was 6/2023 for right shoulder.    Routing to provider for review.    Jeniffer Cabello RN

## 2023-09-20 NOTE — TELEPHONE ENCOUNTER
Shirley,    Please see telephone note. Pt asking for several specific PT referrals. I have pended a referral for your consideration.     Jeniffer Cabello RN

## 2023-10-04 ENCOUNTER — MYC MEDICAL ADVICE (OUTPATIENT)
Dept: FAMILY MEDICINE | Facility: CLINIC | Age: 38
End: 2023-10-04
Payer: COMMERCIAL

## 2023-10-06 ENCOUNTER — THERAPY VISIT (OUTPATIENT)
Dept: PHYSICAL THERAPY | Facility: CLINIC | Age: 38
End: 2023-10-06
Attending: NURSE PRACTITIONER
Payer: COMMERCIAL

## 2023-10-06 DIAGNOSIS — M25.551 HIP PAIN, RIGHT: ICD-10-CM

## 2023-10-06 DIAGNOSIS — M54.2 NECK PAIN: ICD-10-CM

## 2023-10-06 DIAGNOSIS — M25.562 CHRONIC PAIN OF BOTH KNEES: ICD-10-CM

## 2023-10-06 DIAGNOSIS — G89.29 CHRONIC PAIN OF BOTH KNEES: ICD-10-CM

## 2023-10-06 DIAGNOSIS — M25.561 CHRONIC PAIN OF BOTH KNEES: ICD-10-CM

## 2023-10-06 PROCEDURE — 97162 PT EVAL MOD COMPLEX 30 MIN: CPT | Mod: GP | Performed by: PHYSICAL THERAPIST

## 2023-10-06 PROCEDURE — 97110 THERAPEUTIC EXERCISES: CPT | Mod: GP | Performed by: PHYSICAL THERAPIST

## 2023-10-06 PROCEDURE — 97140 MANUAL THERAPY 1/> REGIONS: CPT | Mod: GP | Performed by: PHYSICAL THERAPIST

## 2023-10-06 NOTE — PROGRESS NOTES
PHYSICAL THERAPY EVALUATION  Type of Visit: Evaluation    See electronic medical record for Abuse and Falls Screening details.    Subjective R upper trap and neck pain bother most. Not great  of sx. General pain everywhere. Was going to chiro, that helped, hasn't been in couple months and sx increasing. Cannot go to chiro until next year due to insurance. R hip pain -just kind of bothers her, trying exercise video.  L knee pain on stairs. Occas takes stairs one at a time .      Presenting condition or subjective complaint:    Date of onset: 09/20/23    Relevant medical history: Bladder or bowel problems; Incontinence; Mental Illness   Dates & types of surgery:      Prior diagnostic imaging/testing results:       Prior therapy history for the same diagnosis, illness or injury: Yes      Prior Level of Function  Transfers: Independent  Ambulation: Independent  ADL: Independent  IADL: housework, little walks, occas ex video    Living Environment  Social support: Alone care worker  Type of home: Apartment/condo   Stairs to enter the home: No       Ramp:     Stairs inside the home:         Help at home: Home management tasks (cooking, cleaning); Assist for driving and community activities  Equipment owned:         Patient goals for therapy:  decrease pain    Objective   CERVICAL SPINE EVALUATION  PAIN:   INTEGUMENTARY (edema, incisions):   POSTURE: Sitting Posture: Rounded shoulders, Forward head, Thoracic kyphosis increased    BALANCE/PROPRIOCEPTION:   WEIGHTBEARING ALIGNMENT:   ROM: CROM WNL, LROM flex 75%, ext 80% central LBP, SB WFL, R hip ROM WNL, L hip ER 30*, B knees WNL, B knee crepitus with active knee extension    STRENGTH: hip flex 4/5, hip abd 4/5  FLEXIBILITY:    SPECIAL TESTS:   PALPATION: mod tight R upper trap, signif tightness B cervical, scalenes  SPINAL SEGMENTAL CONCLUSIONS:       Assessment & Plan   CLINICAL IMPRESSIONS  Medical Diagnosis: neck pain, R hip pain, knee pain    Treatment  Diagnosis: R neck, upper trap pain, cervical tightness, poor posture, R hip pain, L knee pain   Impression/Assessment: Patient is a 37 year old female with R neck, R hip , L knee complaints.  The following significant findings have been identified: Pain, Decreased ROM/flexibility, Decreased strength, and Impaired posture. These impairments interfere with their ability to perform self care tasks, recreational activities, household chores, and community mobility as compared to previous level of function.     Clinical Decision Making (Complexity):  Clinical Presentation: Evolving/Changing  Clinical Presentation Rationale: based on medical and personal factors listed in PT evaluation  Clinical Decision Making (Complexity): Moderate complexity    PLAN OF CARE  Treatment Interventions:  Interventions: Manual Therapy, Neuromuscular Re-education, Therapeutic Activity, Therapeutic Exercise, Self-Care/Home Management    Long Term Goals     PT Goal 1  Goal Identifier: 1  Goal Description: pt will have less neck pain with daily activity in 8wk  Target Date: 12/01/23  PT Goal 2  Goal Identifier: 2  Goal Description: pt will be able to do stairs w/o L knee pain in 8wk  Target Date: 12/01/23      Frequency of Treatment: 1x/wk to every other week  Duration of Treatment: 8wks    Recommended Referrals to Other Professionals:   Education Assessment:   Learner/Method: Patient;Caregiver;Demonstration;Pictures/Video;No Barriers to Learning  Education Comments: discussed ex videos, yoga, light aerobic, walking in place videos    Risks and benefits of evaluation/treatment have been explained.   Patient/Family/caregiver agrees with Plan of Care.     Evaluation Time:     PT Eval, Moderate Complexity Minutes (39597): 25       Signing Clinician: Kris Hoenk, PT      [facility]                                                                                   OUTPATIENT PHYSICAL THERAPY      PLAN OF TREATMENT FOR  OUTPATIENT REHABILITATION   Patient's Last Name, First Name, Maddy Guardado YOB: 1985   Provider's Name   [facility]   Medical Record No.  [id number]     Onset Date: 09/20/23  Start of Care Date: 10/06/23     Medical Diagnosis:  neck pain, R hip pain, knee pain      PT Treatment Diagnosis:  R neck, upper trap pain, cervical tightness, poor posture, R hip pain, L knee pain Plan of Treatment  Frequency/Duration: 1x/wk to every other week/ 8wks    Certification date from 10/06/23 to 12/01/23         See note for plan of treatment details and functional goals     Kris Hoenk, PT                         I CERTIFY THE NEED FOR THESE SERVICES FURNISHED UNDER        THIS PLAN OF TREATMENT AND WHILE UNDER MY CARE     (Physician attestation of this document indicates review and certification of the therapy plan).                Referring Provider:  Shirley Freeman      Initial Assessment  See Epic Evaluation- Start of Care Date: 10/06/23

## 2023-10-10 ENCOUNTER — TELEPHONE (OUTPATIENT)
Dept: FAMILY MEDICINE | Facility: CLINIC | Age: 38
End: 2023-10-10
Payer: COMMERCIAL

## 2023-10-10 DIAGNOSIS — E11.9 TYPE 2 DIABETES MELLITUS WITHOUT COMPLICATION, WITHOUT LONG-TERM CURRENT USE OF INSULIN (H): ICD-10-CM

## 2023-10-11 RX ORDER — PEN NEEDLE, DIABETIC 32GX 5/32"
NEEDLE, DISPOSABLE MISCELLANEOUS
Qty: 100 EACH | Refills: 3 | Status: SHIPPED | OUTPATIENT
Start: 2023-10-11

## 2023-10-11 NOTE — TELEPHONE ENCOUNTER
Patient is out of needles, would like to pick them up at pharmacy tomorrow, Thu, 10/12/23. Simona Gibosn on 10/11/2023 at 10:47 AM

## 2023-10-16 ENCOUNTER — VIRTUAL VISIT (OUTPATIENT)
Dept: FAMILY MEDICINE | Facility: CLINIC | Age: 38
End: 2023-10-16
Payer: COMMERCIAL

## 2023-10-16 DIAGNOSIS — F80.9: Primary | ICD-10-CM

## 2023-10-16 PROCEDURE — 99441 PR PHYSICIAN TELEPHONE EVALUATION 5-10 MIN: CPT | Mod: 95 | Performed by: NURSE PRACTITIONER

## 2023-10-16 NOTE — PROGRESS NOTES
Maddy is a 37 year old who is being evaluated via a billable telephone visit.      What phone number would you like to be contacted at? [phone]  How would you like to obtain your AVS? Saloni    Distant Location (provider location):  On-site    Assessment & Plan     Difficulty communicating  - Speech Therapy Referral; Future    The risks, benefits and treatment options of prescribed medications or other treatments have been discussed with the patient. The patient verbalized their understanding and should call or follow up if no improvement or if they develop further problems.  PHILL Luo [facility]            Subjective   Maddy is a 37 year old, presenting for the following health issues:  Referral (Speech therapy)      10/16/2023     2:35 PM   Additional Questions   Roomed by Monique SEPULVEDA   Accompanied by self         10/16/2023     2:35 PM   Patient Reported Additional Medications   Patient reports taking the following new medications propranolol 10 mg three times daily, venlafaxine 37.5 mg        HPI     Requesting speech therapy referral. Feels she could better manage emotions and be a better communicator.       Patient feels that she is having difficulty communicating with others.   Having difficulty with social cues.    Continues to struggle with her mental health  Feeling paranoid and confused.        She has a mental health care team as well as services set up through the community.              Review of Systems   Constitutional, HEENT, cardiovascular, pulmonary, gi and gu systems are negative, except as otherwise noted.      Objective           Vitals:  No vitals were obtained today due to virtual visit.    Physical Exam   PSYCH: Alert and oriented times 3; coherent speech, normal   rate and volume, able to articulate logical thoughts, able   to abstract reason, no tangential thoughts, no hallucinations   or delusions    RESP: No cough, no audible wheezing, able to  talk in full sentences  Remainder of exam unable to be completed due to telephone visits                Phone call duration: 5 minutes

## 2023-10-17 ENCOUNTER — MEDICAL CORRESPONDENCE (OUTPATIENT)
Dept: HEALTH INFORMATION MANAGEMENT | Facility: CLINIC | Age: 38
End: 2023-10-17
Payer: COMMERCIAL

## 2023-10-18 ENCOUNTER — LAB (OUTPATIENT)
Dept: LAB | Facility: CLINIC | Age: 38
End: 2023-10-18
Payer: COMMERCIAL

## 2023-10-18 DIAGNOSIS — E11.9 TYPE 2 DIABETES MELLITUS WITHOUT COMPLICATION, WITHOUT LONG-TERM CURRENT USE OF INSULIN (H): Primary | ICD-10-CM

## 2023-10-18 DIAGNOSIS — F25.0 SCHIZOAFFECTIVE DISORDER, BIPOLAR TYPE (H): ICD-10-CM

## 2023-10-18 DIAGNOSIS — E11.9 TYPE 2 DIABETES MELLITUS WITHOUT COMPLICATION, WITHOUT LONG-TERM CURRENT USE OF INSULIN (H): ICD-10-CM

## 2023-10-18 DIAGNOSIS — Z79.899 ENCOUNTER FOR LONG-TERM (CURRENT) USE OF HIGH-RISK MEDICATION: ICD-10-CM

## 2023-10-18 LAB
ALBUMIN SERPL BCG-MCNC: 4.7 G/DL (ref 3.5–5.2)
ALP SERPL-CCNC: 87 U/L (ref 35–104)
ALT SERPL W P-5'-P-CCNC: 35 U/L (ref 0–50)
ANION GAP SERPL CALCULATED.3IONS-SCNC: 11 MMOL/L (ref 7–15)
AST SERPL W P-5'-P-CCNC: 24 U/L (ref 0–45)
BASO+EOS+MONOS # BLD AUTO: NORMAL 10*3/UL
BASO+EOS+MONOS NFR BLD AUTO: NORMAL %
BASOPHILS # BLD AUTO: 0.1 10E3/UL (ref 0–0.2)
BASOPHILS NFR BLD AUTO: 1 %
BILIRUB SERPL-MCNC: 0.4 MG/DL
BUN SERPL-MCNC: 15.7 MG/DL (ref 6–20)
CALCIUM SERPL-MCNC: 9.8 MG/DL (ref 8.6–10)
CHLORIDE SERPL-SCNC: 103 MMOL/L (ref 98–107)
CREAT SERPL-MCNC: 0.8 MG/DL (ref 0.51–0.95)
DEPRECATED HCO3 PLAS-SCNC: 23 MMOL/L (ref 22–29)
EGFRCR SERPLBLD CKD-EPI 2021: >90 ML/MIN/1.73M2
EOSINOPHIL # BLD AUTO: 0.2 10E3/UL (ref 0–0.7)
EOSINOPHIL NFR BLD AUTO: 3 %
ERYTHROCYTE [DISTWIDTH] IN BLOOD BY AUTOMATED COUNT: 12.2 % (ref 10–15)
GLUCOSE SERPL-MCNC: 102 MG/DL (ref 70–99)
HBA1C MFR BLD: 5.3 % (ref 0–5.6)
HCT VFR BLD AUTO: 42.4 % (ref 35–47)
HGB BLD-MCNC: 14.1 G/DL (ref 11.7–15.7)
IMM GRANULOCYTES # BLD: 0 10E3/UL
IMM GRANULOCYTES NFR BLD: 0 %
LITHIUM SERPL-SCNC: 0.69 MMOL/L (ref 0.6–1.2)
LYMPHOCYTES # BLD AUTO: 2.2 10E3/UL (ref 0.8–5.3)
LYMPHOCYTES NFR BLD AUTO: 28 %
MCH RBC QN AUTO: 29 PG (ref 26.5–33)
MCHC RBC AUTO-ENTMCNC: 33.3 G/DL (ref 31.5–36.5)
MCV RBC AUTO: 87 FL (ref 78–100)
MONOCYTES # BLD AUTO: 0.5 10E3/UL (ref 0–1.3)
MONOCYTES NFR BLD AUTO: 6 %
NEUTROPHILS # BLD AUTO: 4.9 10E3/UL (ref 1.6–8.3)
NEUTROPHILS NFR BLD AUTO: 62 %
PLATELET # BLD AUTO: 162 10E3/UL (ref 150–450)
POTASSIUM SERPL-SCNC: 3.9 MMOL/L (ref 3.4–5.3)
PROT SERPL-MCNC: 7.7 G/DL (ref 6.4–8.3)
RBC # BLD AUTO: 4.87 10E6/UL (ref 3.8–5.2)
SODIUM SERPL-SCNC: 137 MMOL/L (ref 135–145)
TSH SERPL DL<=0.005 MIU/L-ACNC: 0.95 UIU/ML (ref 0.3–4.2)
WBC # BLD AUTO: 8 10E3/UL (ref 4–11)

## 2023-10-18 PROCEDURE — 85025 COMPLETE CBC W/AUTO DIFF WBC: CPT

## 2023-10-18 PROCEDURE — 84443 ASSAY THYROID STIM HORMONE: CPT

## 2023-10-18 PROCEDURE — 80053 COMPREHEN METABOLIC PANEL: CPT

## 2023-10-18 PROCEDURE — 36415 COLL VENOUS BLD VENIPUNCTURE: CPT

## 2023-10-18 PROCEDURE — 80178 ASSAY OF LITHIUM: CPT

## 2023-10-18 PROCEDURE — 83036 HEMOGLOBIN GLYCOSYLATED A1C: CPT

## 2023-10-19 ENCOUNTER — TRANSFERRED RECORDS (OUTPATIENT)
Dept: HEALTH INFORMATION MANAGEMENT | Facility: CLINIC | Age: 38
End: 2023-10-19
Payer: COMMERCIAL

## 2023-10-19 LAB — PHQ9 SCORE: 5

## 2023-10-21 NOTE — DISCHARGE INSTRUCTIONS
Behavioral Discharge Planning and Instructions    Summary: You were admitted on 3/3/2022  due to disorganized thinking, paranoia and delusions, along with depressive symptoms. You were treated by Mary Reeves NP and discharged on 03/30/22 from [place] [address] to home.    Main Diagnosis:     Schizoaffective Disorder, bipolar type, with multiple episodes currently in acute episode with mixed mood state and active psychosis symptoms    Generalized Anxiety Disorder     Health Care Follow-up:     Therapy Appointment: 04/01/2022 10:00am via video  Callie Manley LP- [facility] # [phone]    Psychiatry Appointment: 04/20/20229:30AM via video  Shirley Gooden NP- [facility] #[phone]    Care Team     COLLEEN Roche CM- [facility]- [phone] - You receive ILS and in home nursing    Therapist- Callie Manley LP-[phone]    Psychiatrist-Shirley Gooden NP- [facility]- [phone]    [facility] Work[facility]- [facility] [phone]    Outpatient DBT Group through [facility] ( [place])       Attend all scheduled appointments with your outpatient providers. Call at least 24 hours in advance if you need to reschedule an appointment to ensure continued access to your outpatient providers.     Major Treatments, Procedures and Findings:  You were provided with: a psychiatric assessment, assessed for medical stability, medication evaluation and/or management, group therapy, milieu management and medical interventions    Symptoms to Report: feeling more aggressive, increased confusion, losing more sleep, mood getting worse or thoughts of suicide    Early warning signs can include: increased depression or anxiety sleep disturbances increased thoughts or behaviors of suicide or self-harm  increased unusual thinking, such as paranoia or hearing voices    Safety and Wellness:  Take all medicines as directed.  Make no changes  "unless your doctor suggests them.      Follow treatment recommendations.  Refrain from alcohol and non-prescribed drugs.  If there is a concern for safety, call 911.    Resources:   Crisis Intervention: 858.205.2523 or 161-193-2298 (TTY: 755.268.1280).  Call anytime for help.  National [place] on Mental Illness (www.[place].vincent.org): 757.507.8124 or 738-123-2774.  [place] Association for Children's Mental Health (www.mac.org): 199.471.7033.  Alcoholics Anonymous (www.alcoholics-anonymous.org): Check your phone book for your local chapter.  Suicide Awareness Voices of Education (SAVE) (www.save.org): 717-012-NCWA (3446)  National Suicide Prevention Line (www.mentalhealthmn.org): 855-248-OJGG (1695)  Mental Health Consumer/Survivor Network of [place] (www.mhcsn.net): 888.574.4823 or 729-267-9053  Mental Health Association of [place] (www.mentalhealth.org): 559.502.5195 or 617-682-7372  Self- Management and Recovery Training., SMART-- Toll free: 458.174.5720  www.AppZero.CSRware  [facility] Crisis (COPE) Response - Adult 527 972-3794  Text 4 Life: txt \"LIFE\" to 71392 for immediate support and crisis intervention  Crisis text line: Text \"[place]\" to 104814. Free, confidential, 24/7.  Crisis Intervention: 201.279.3277 or 166-678-5394. Call anytime for help.   [facility] Health Crisis Team - Child: [phone]    General Medication Instructions:   See your medication sheet(s) for instructions.   Take all medicines as directed.  Make no changes unless your doctor suggests them.   Go to all your doctor visits.  Be sure to have all your required lab tests. This way, your medicines can be refilled on time.  Do not use any drugs not prescribed by your doctor.  Avoid alcohol.    Advance Directives:   Scanned document on file with [facility]? No scanned doc  Is document scanned? Pt states no documents  Honoring Choices Your Rights Handout: Informed and given  Was more information offered? Pt declined    The Treatment team " has appreciated the opportunity to work with you. If you have any questions or concerns about your recent admission, you can contact the unit which can receive your call 24 hours a day, 7 days a week. They will be able to get in touch with a Provider if needed. The unit number is [phone] .   No

## 2023-10-26 ENCOUNTER — TRANSFERRED RECORDS (OUTPATIENT)
Dept: HEALTH INFORMATION MANAGEMENT | Facility: CLINIC | Age: 38
End: 2023-10-26
Payer: COMMERCIAL

## 2023-10-26 LAB — PHQ9 SCORE: 7

## 2023-10-27 ENCOUNTER — THERAPY VISIT (OUTPATIENT)
Dept: PHYSICAL THERAPY | Facility: CLINIC | Age: 38
End: 2023-10-27
Attending: NURSE PRACTITIONER
Payer: COMMERCIAL

## 2023-10-27 DIAGNOSIS — M54.2 NECK PAIN: Primary | ICD-10-CM

## 2023-10-27 DIAGNOSIS — M25.561 CHRONIC PAIN OF BOTH KNEES: ICD-10-CM

## 2023-10-27 DIAGNOSIS — M25.562 CHRONIC PAIN OF BOTH KNEES: ICD-10-CM

## 2023-10-27 DIAGNOSIS — G89.29 CHRONIC PAIN OF BOTH KNEES: ICD-10-CM

## 2023-10-27 PROCEDURE — 97110 THERAPEUTIC EXERCISES: CPT | Mod: GP | Performed by: PHYSICAL THERAPIST

## 2023-10-29 ENCOUNTER — NURSE TRIAGE (OUTPATIENT)
Dept: NURSING | Facility: CLINIC | Age: 38
End: 2023-10-29
Payer: COMMERCIAL

## 2023-10-29 NOTE — TELEPHONE ENCOUNTER
"Patient reporting neighbors smoke cannabis.  Smell very strong and gets into their apartment.  Exposed to cannabis and concerned about mixture with her Venlafaxine psych medication.  They live in a subsidized housing complex.      Has had tiredness recently.  New psych med that has been relaxing,  Venlafaxine, has increased dose, \"so it may be from that.\"    \"I want to avoid getting high.\"  Denies dizziness, confusion, hallucinations.    Writer recommended speaking to Pharmacy and/or her mental health provider about her concerns, but Micromedex shows no interaction with Venlafaxine and Cannabis.  Also advised patient speak with manager and/or neighbor about situation..    Patient verbalizes understanding and will call her clinic in the morning.    Celine Collins RN  [facility]      Reason for Disposition   [1] Caller has medicine question about med NOT prescribed by PCP AND [2] triager unable to answer question (e.g., compatibility with other med, storage)    Protocols used: Medication Question Call-A-AH    "

## 2023-11-08 ENCOUNTER — THERAPY VISIT (OUTPATIENT)
Dept: PHYSICAL THERAPY | Facility: CLINIC | Age: 38
End: 2023-11-08
Attending: NURSE PRACTITIONER
Payer: COMMERCIAL

## 2023-11-08 DIAGNOSIS — M25.561 CHRONIC PAIN OF BOTH KNEES: ICD-10-CM

## 2023-11-08 DIAGNOSIS — M25.551 HIP PAIN, RIGHT: ICD-10-CM

## 2023-11-08 DIAGNOSIS — M25.562 CHRONIC PAIN OF BOTH KNEES: ICD-10-CM

## 2023-11-08 DIAGNOSIS — M54.2 NECK PAIN: Primary | ICD-10-CM

## 2023-11-08 DIAGNOSIS — G89.29 CHRONIC PAIN OF BOTH KNEES: ICD-10-CM

## 2023-11-08 PROCEDURE — 97110 THERAPEUTIC EXERCISES: CPT | Mod: GP | Performed by: PHYSICAL THERAPIST

## 2023-11-16 ENCOUNTER — TRANSFERRED RECORDS (OUTPATIENT)
Dept: HEALTH INFORMATION MANAGEMENT | Facility: CLINIC | Age: 38
End: 2023-11-16
Payer: COMMERCIAL

## 2023-11-16 LAB — PHQ9 SCORE: 7

## 2023-11-18 DIAGNOSIS — E03.9 ACQUIRED HYPOTHYROIDISM: ICD-10-CM

## 2023-11-20 ENCOUNTER — VIRTUAL VISIT (OUTPATIENT)
Dept: SPEECH THERAPY | Facility: CLINIC | Age: 38
End: 2023-11-20
Attending: NURSE PRACTITIONER
Payer: COMMERCIAL

## 2023-11-20 DIAGNOSIS — Z79.899 ENCOUNTER FOR LONG-TERM (CURRENT) USE OF MEDICATIONS: Primary | ICD-10-CM

## 2023-11-20 DIAGNOSIS — F25.0 SCHIZOAFFECTIVE DISORDER, BIPOLAR TYPE (H): ICD-10-CM

## 2023-11-20 DIAGNOSIS — F33.1 MAJOR DEPRESSIVE DISORDER, RECURRENT EPISODE, MODERATE (H): ICD-10-CM

## 2023-11-20 DIAGNOSIS — F80.9: Primary | ICD-10-CM

## 2023-11-20 PROCEDURE — 92523 SPEECH SOUND LANG COMPREHEN: CPT | Mod: GN,95 | Performed by: SPEECH-LANGUAGE PATHOLOGIST

## 2023-11-20 RX ORDER — LEVOTHYROXINE SODIUM 50 UG/1
50 TABLET ORAL
Qty: 90 TABLET | Refills: 3 | Status: SHIPPED | OUTPATIENT
Start: 2023-11-20 | End: 2024-09-24

## 2023-11-20 NOTE — PROGRESS NOTES
SPEECH LANGUAGE PATHOLOGY EVALUATION    See electronic medical record for Abuse and Falls Screening details.    Subjective      Presenting condition or subjective complaint: help with communication and social skills  Date of onset: 10/16/23 (order date)    Relevant medical history:   Pt is disabled and lives in [facility].  She has a daughter and socializes with family and is interested in socializing during Link events.  PMH includes: anxiety, depression, Schizoaffective disorder-bipolar type.    Prior therapy history for the same diagnosis, illness or injury: No      Living Environment  Social support:   Has two therapists for mental health needs  Help at home:  IHS helper 12 hrs/week    Patient goals for therapy: Be more social to help with anxiety, depression, and lonliness and to be more functional.    Pain assessment: Pain denied     Objective     AUDITORY COMPREHENSION (understanding of spoken language)  Auditory comprehension level of impairment: no impairment via informal measure during conversation.    VERBAL EXPRESSION (use of spoken language to express information)  Conversational Speech: sentence level: intact    Verbal expression level of impairment: no impairment    PRAGMATICS (the social or functional use of language)  Nonverbal skills: deficits noted: eye contact, facial expression   Verbal Skills: deficits noted: topic maintenance, Generally stays on topic but will add details not relevant/appropriate when describing an event.  Deficits with greetings and left visit by turning off virtual session without saying 'goodbye'.  Pt did not keep eye contact with screen during session and often looked off to her right when talking.   Pt reports she notices when she is talking to family they will look at each other or say 'here we go' when pt's conversation is having social deficits.  Pragmatics level of impairment:  Mild-moderate      Assessment & Plan   CLINICAL IMPRESSIONS   Medical Diagnosis: Difficulty  communicating    Treatment Diagnosis: Difficulty communicating   Impression/Assessment: Pt is a 37 year old female with communication complaints. The following significant findings have been identified:  pragmatic communication deficits , characterized by decreased ability to read social cues and communication breakdowns due to communication differences. Identified deficits interfere with their ability to communicate within the home or community as compared to previous level of function.    PLAN OF CARE  Treatment Interventions: Communication    Prognosis to achieve stated therapy goals is good   Rehab potential is impacted by: patient motivation    Long Term Goals   SLP Goal 1  Goal Identifier: pragmatic skills  Goal Description: Pt will verbalize understanding of 3 pragmatic skills used in conversation to use as target focus in conversation. (ie eye contact, greetings, turn taking)  Rationale: To maximize functional communication within the home or community  Target Date: 12/19/23  SLP Goal 2  Goal Identifier: id/use strategies  Goal Description: Pt will develop target strategy with SLP and IHS worker for use in community and social setting.  Rationale: To maximize functional communication within the home or community  Target Date: 01/04/24  SLP Goal 3  Goal Identifier: home program  Goal Description: Pt will utilize home program for generalization of strategies with communication partners across 3 visits.  Rationale: To maximize functional communication within the home or community  Target Date: 01/15/24      Frequency of Treatment: 1x/wk  Duration of Treatment: 8 weeks     Education Assessment:   Learner/Method: Patient    Risks and benefits of evaluation/treatment have been explained.   Patient/Family/caregiver agrees with Plan of Care.     Evaluation Time:    Sound production with lang comprehension and expression minutes (65122): 50        Signing Clinician: Nadia Calle SLP    [facility]  Services                                                                                   OUTPATIENT SPEECH LANGUAGE PATHOLOGY      PLAN OF TREATMENT FOR OUTPATIENT REHABILITATION   Patient's Last Name, First Name, Maddy Guardado YOB: 1985   Provider's Name   [facility]   Medical Record No.  [id number]     Onset Date: 10/16/23 (order date) Start of Care Date: 11/20/23     Medical Diagnosis:  Difficulty communicating      SLP Treatment Diagnosis: Difficulty communicating  Plan of Treatment  Frequency/Duration: 1x/wk  / 8 weeks     Certification date from 11/20/23   To 01/15/24          See note for plan of treatment details and functional goals     Nadia Calle, SLP                         I CERTIFY THE NEED FOR THESE SERVICES FURNISHED UNDER        THIS PLAN OF TREATMENT AND WHILE UNDER MY CARE     (Physician attestation of this document indicates review and certification of the therapy plan).              Referring Provider:  Shirley Freeman    Initial Assessment  See Epic Evaluation- 11/20/23

## 2023-11-28 ENCOUNTER — THERAPY VISIT (OUTPATIENT)
Dept: PHYSICAL THERAPY | Facility: CLINIC | Age: 38
End: 2023-11-28
Attending: NURSE PRACTITIONER
Payer: COMMERCIAL

## 2023-11-28 DIAGNOSIS — R32 URINARY INCONTINENCE, UNSPECIFIED TYPE: Primary | ICD-10-CM

## 2023-11-28 PROCEDURE — 97110 THERAPEUTIC EXERCISES: CPT | Mod: GP | Performed by: PHYSICAL THERAPIST

## 2023-11-28 PROCEDURE — 97162 PT EVAL MOD COMPLEX 30 MIN: CPT | Mod: GP | Performed by: PHYSICAL THERAPIST

## 2023-11-29 ENCOUNTER — TRANSFERRED RECORDS (OUTPATIENT)
Dept: HEALTH INFORMATION MANAGEMENT | Facility: CLINIC | Age: 38
End: 2023-11-29

## 2023-11-29 ENCOUNTER — VIRTUAL VISIT (OUTPATIENT)
Dept: SURGERY | Facility: CLINIC | Age: 38
End: 2023-11-29
Payer: COMMERCIAL

## 2023-11-29 DIAGNOSIS — E11.9 TYPE 2 DIABETES MELLITUS WITHOUT COMPLICATION, WITHOUT LONG-TERM CURRENT USE OF INSULIN (H): Primary | ICD-10-CM

## 2023-11-29 DIAGNOSIS — E66.9 OBESITY (BMI 30-39.9): ICD-10-CM

## 2023-11-29 LAB — PHQ9 SCORE: 16

## 2023-11-29 PROCEDURE — 97803 MED NUTRITION INDIV SUBSEQ: CPT | Mod: 95 | Performed by: DIETITIAN, REGISTERED

## 2023-11-29 NOTE — PROGRESS NOTES
Maddy Ortiz is a 37 year old who is being evaluated via a billable video visit.      Medical  Weight Loss Follow-Up Diet Evaluation  Assessment:  Maddy is presenting today for a follow up weight management nutrition consultation. Pt has had an initial appointment with Dr. Serna  Personal goals: lose weight to have umbilical hernia fixed - needs to get to 200 lbs  Weight loss medication:  victoza  Pt's weight is 233 lb   Initial weight: 202 lb  Weight change: down 20 lbs from high of 253 lbs        5/28/2022     4:35 AM   Changes and Difficulties   I have made the following changes to my diet since my last visit: meal planning   I have made the following changes to my activity/exercise since my last visit: active physical activity   With regards to my activity/exercise, I am still struggling with: rest - tend to work hard 1 week rest the next week     BMI: There is no height or weight on file to calculate BMI.  Ideal body weight: 52.4 kg (115 lb 8.3 oz)  Adjusted ideal body weight: 74.3 kg (163 lb 11.4 oz)    Estimated RMR (Boyle-St Jeor equation):   1727 kcals x 1.2 (sedentary) = 2072 kcals (for weight maintenance)  Recommended Protein Intake: 60-80 grams of protein/day  Patient Active Problem List:  Patient Active Problem List   Diagnosis    Animal dander allergy    CARDIOVASCULAR SCREENING; LDL GOAL LESS THAN 160    Psychosis (H)    Paranoid type delusional disorder (H)    Bipolar 1 disorder (H)    Insomnia    Depression with anxiety    Seasonal allergic rhinitis due to pollen    Allergic rhinitis due to mold    Allergic rhinitis due to animal dander    Allergic rhinitis due to dust mite    Encounter for IUD insertion    Schizoaffective disorder, bipolar type (H)    Gastroesophageal reflux disease without esophagitis    Paranoia (psychosis) (H)    Morbid obesity (H)    Schizoaffective disorder (H)    Umbilical hernia without obstruction and without gangrene    Fatty liver    Type 2 diabetes mellitus without  complication, without long-term current use of insulin (H)    Elevated LFTs    Disorganized behavior    Infection due to 2019 novel coronavirus    Polypharmacy    Sedated due to multiple medications    Overdose, undetermined intent, initial encounter    Segmental and somatic dysfunction    SI (sacroiliac) joint dysfunction    mirena IUD 9/30/22    Neck pain    Hip pain, right    Chronic pain of both knees    Difficulty communicating    Urinary incontinence, unspecified type     Diabetes: reports blood glucose running around 100- not checking regularly  Most recent A1c 6.4 in June  Progress on goals from last visit:  She is concerned about her weight gain of about 5 lbs in about 2 months. She admits she has been drinking more sugary soda and sugary food. Sleeping a lot more recently and gets drowsy. Her medications have been changing and this has been hard on her.   -Gets Mom's meals as well and goes to food shelf  Eat breakfast daily/3 meals per day - met   Continue to have 2 servings of veggies daily - met  Exercising by youtube videos at home - met    Dietary Recall:  Breakfast: 9 am Cereal (adds sugar) and lactaid milk  Lunch: 12-2 pm [place] OR Mom's meal.   Dinner: Soup with crackers OR ready to eat meal from food shelf OR pasta skillet or fried rice - been chopping more vegetable to go with soup, potatoes, broccoli, carrots, onion, peppers   Beverages:   Water: some times, planning to get plastic cups to increase intake. Her cups at home taste like soap  Regular soda - for caffeine to help with drowsiness  Exercise:   [employer] - elliptical, treadmill, swimming, bicycle - going most days of the week  Nutrition Diagnosis:    Obesity related to overeating and poor lifestyle habits as evidenced by patient's report of limited budget, inconsistent meals, large portions, frequent snacking of sweets, lack of activity and BMI 39.15    Intervention:  Food and/or nutrient delivery: Prioritize protein and fiber foods at  meals, continue to aim for 3 meals per day, avoid skipping meals  Nutrition counseling: discussed meal ideas to increase protein and fiber, goal setting    Monitoring/Evaluation:    Goals:  Eat breakfast daily/3 meals per day  Keep food journal   Increase water intake (buy plastic cups) - avoid soda  Continue going to the [facility]    Patient to follow up in 2 month(s) with RD    Video-Visit Details    Type of service:  Video Visit    Video Start Time (time video started): 11:01 am    Video End Time (time video stopped): 11:22 am    Originating Location (pt. Location): Home        Distant Location (provider location):  On-site    Mode of Communication:  Video Conference via [facility]    Physician has received verbal consent for a Video Visit from the patient? Yes      Angeli Domingo

## 2023-11-29 NOTE — LETTER
11/29/2023         RE: Maddy Ortiz  [address]  [facility] [address]        Dear Colleague,    Thank you for referring your patient, Maddy Ortiz, to the [facility] [place]. Please see a copy of my visit note below.    Maddy Ortiz is a 37 year old who is being evaluated via a billable video visit.      Medical  Weight Loss Follow-Up Diet Evaluation  Assessment:  Maddy is presenting today for a follow up weight management nutrition consultation. Pt has had an initial appointment with Dr. Serna  Personal goals: lose weight to have umbilical hernia fixed - needs to get to 200 lbs  Weight loss medication:  victoza  Pt's weight is 233 lb   Initial weight: 202 lb  Weight change: down 20 lbs from high of 253 lbs        5/28/2022     4:35 AM   Changes and Difficulties   I have made the following changes to my diet since my last visit: meal planning   I have made the following changes to my activity/exercise since my last visit: active physical activity   With regards to my activity/exercise, I am still struggling with: rest - tend to work hard 1 week rest the next week     BMI: There is no height or weight on file to calculate BMI.  Ideal body weight: 52.4 kg (115 lb 8.3 oz)  Adjusted ideal body weight: 74.3 kg (163 lb 11.4 oz)    Estimated RMR (Byars-St Jeor equation):   1727 kcals x 1.2 (sedentary) = 2072 kcals (for weight maintenance)  Recommended Protein Intake: 60-80 grams of protein/day  Patient Active Problem List:  Patient Active Problem List   Diagnosis     Animal dander allergy     CARDIOVASCULAR SCREENING; LDL GOAL LESS THAN 160     Psychosis (H)     Paranoid type delusional disorder (H)     Bipolar 1 disorder (H)     Insomnia     Depression with anxiety     Seasonal allergic rhinitis due to pollen     Allergic rhinitis due to mold     Allergic rhinitis due to animal dander     Allergic rhinitis due to dust mite     Encounter for IUD  insertion     Schizoaffective disorder, bipolar type (H)     Gastroesophageal reflux disease without esophagitis     Paranoia (psychosis) (H)     Morbid obesity (H)     Schizoaffective disorder (H)     Umbilical hernia without obstruction and without gangrene     Fatty liver     Type 2 diabetes mellitus without complication, without long-term current use of insulin (H)     Elevated LFTs     Disorganized behavior     Infection due to 2019 novel coronavirus     Polypharmacy     Sedated due to multiple medications     Overdose, undetermined intent, initial encounter     Segmental and somatic dysfunction     SI (sacroiliac) joint dysfunction     mirena IUD 9/30/22     Neck pain     Hip pain, right     Chronic pain of both knees     Difficulty communicating     Urinary incontinence, unspecified type     Diabetes: reports blood glucose running around 100- not checking regularly  Most recent A1c 6.4 in June  Progress on goals from last visit:  She is concerned about her weight gain of about 5 lbs in about 2 months. She admits she has been drinking more sugary soda and sugary food. Sleeping a lot more recently and gets drowsy. Her medications have been changing and this has been hard on her.   -Gets Mom's meals as well and goes to food shelf  Eat breakfast daily/3 meals per day - met   Continue to have 2 servings of veggies daily - met  Exercising by youtube videos at home - met    Dietary Recall:  Breakfast: 9 am Cereal (adds sugar) and lactaid milk  Lunch: 12-2 pm [place] OR Mom's meal.   Dinner: Soup with crackers OR ready to eat meal from food shelf OR pasta skillet or fried rice - been chopping more vegetable to go with soup, potatoes, broccoli, carrots, onion, peppers   Beverages:   Water: some times, planning to get plastic cups to increase intake. Her cups at home taste like soap  Regular soda - for caffeine to help with drowsiness  Exercise:   [employer] - elliptical, treadmill, swimming, bicycle - going most days of  the week  Nutrition Diagnosis:    Obesity related to overeating and poor lifestyle habits as evidenced by patient's report of limited budget, inconsistent meals, large portions, frequent snacking of sweets, lack of activity and BMI 39.15    Intervention:  Food and/or nutrient delivery: Prioritize protein and fiber foods at meals, continue to aim for 3 meals per day, avoid skipping meals  Nutrition counseling: discussed meal ideas to increase protein and fiber, goal setting    Monitoring/Evaluation:    Goals:  Eat breakfast daily/3 meals per day  Keep food journal   Increase water intake (buy plastic cups) - avoid soda  [facility]    Patient to follow up in 2 month(s) with ELVIA    Video-Visit Details    Type of service:  Video Visit    Video Start Time (time video started): 11:01 am    Video End Time (time video stopped): 11:22 am    Originating Location (pt. Location): Home        Distant Location (provider location):  On-site    Mode of Communication:  Video Conference via [facility]    Physician has received verbal consent for a Video Visit from the patient? Yes      Angeli Domingo      Again, thank you for allowing me to participate in the care of your patient.        Sincerely,        Angeli Domingo RD

## 2023-11-30 ENCOUNTER — MYC MEDICAL ADVICE (OUTPATIENT)
Dept: FAMILY MEDICINE | Facility: CLINIC | Age: 38
End: 2023-11-30
Payer: COMMERCIAL

## 2023-12-05 ENCOUNTER — MYC MEDICAL ADVICE (OUTPATIENT)
Dept: FAMILY MEDICINE | Facility: CLINIC | Age: 38
End: 2023-12-05

## 2023-12-06 ENCOUNTER — LAB (OUTPATIENT)
Dept: LAB | Facility: CLINIC | Age: 38
End: 2023-12-06
Payer: COMMERCIAL

## 2023-12-06 DIAGNOSIS — Z79.899 ENCOUNTER FOR LONG-TERM (CURRENT) USE OF MEDICATIONS: ICD-10-CM

## 2023-12-06 DIAGNOSIS — F20.9 SCHIZOPHRENIA (H): ICD-10-CM

## 2023-12-06 DIAGNOSIS — F33.1 MAJOR DEPRESSIVE DISORDER, RECURRENT EPISODE, MODERATE (H): ICD-10-CM

## 2023-12-06 DIAGNOSIS — Z79.899 ENCOUNTER FOR LONG-TERM (CURRENT) USE OF HIGH-RISK MEDICATION: ICD-10-CM

## 2023-12-06 DIAGNOSIS — F25.0 SCHIZOAFFECTIVE DISORDER, BIPOLAR TYPE (H): ICD-10-CM

## 2023-12-06 LAB
BASOPHILS # BLD AUTO: 0 10E3/UL (ref 0–0.2)
BASOPHILS NFR BLD AUTO: 0 %
CHOLEST SERPL-MCNC: 219 MG/DL
EOSINOPHIL # BLD AUTO: 0.2 10E3/UL (ref 0–0.7)
EOSINOPHIL NFR BLD AUTO: 3 %
ERYTHROCYTE [DISTWIDTH] IN BLOOD BY AUTOMATED COUNT: 11.7 % (ref 10–15)
FASTING STATUS PATIENT QL REPORTED: YES
FASTING STATUS PATIENT QL REPORTED: YES
GLUCOSE SERPL-MCNC: 91 MG/DL (ref 70–99)
HBA1C MFR BLD: 4.9 % (ref 0–5.6)
HCT VFR BLD AUTO: 42.4 % (ref 35–47)
HDLC SERPL-MCNC: 34 MG/DL
HGB BLD-MCNC: 14 G/DL (ref 11.7–15.7)
IMM GRANULOCYTES # BLD: 0 10E3/UL
IMM GRANULOCYTES NFR BLD: 0 %
LDLC SERPL CALC-MCNC: 121 MG/DL
LITHIUM SERPL-SCNC: 0.78 MMOL/L (ref 0.6–1.2)
LYMPHOCYTES # BLD AUTO: 2.3 10E3/UL (ref 0.8–5.3)
LYMPHOCYTES NFR BLD AUTO: 25 %
MCH RBC QN AUTO: 29 PG (ref 26.5–33)
MCHC RBC AUTO-ENTMCNC: 33 G/DL (ref 31.5–36.5)
MCV RBC AUTO: 88 FL (ref 78–100)
MONOCYTES # BLD AUTO: 0.5 10E3/UL (ref 0–1.3)
MONOCYTES NFR BLD AUTO: 6 %
NEUTROPHILS # BLD AUTO: 6 10E3/UL (ref 1.6–8.3)
NEUTROPHILS NFR BLD AUTO: 66 %
NONHDLC SERPL-MCNC: 185 MG/DL
PLATELET # BLD AUTO: 164 10E3/UL (ref 150–450)
PROLACTIN SERPL 3RD IS-MCNC: 166 NG/ML (ref 5–23)
RBC # BLD AUTO: 4.83 10E6/UL (ref 3.8–5.2)
TRIGL SERPL-MCNC: 320 MG/DL
WBC # BLD AUTO: 9.1 10E3/UL (ref 4–11)

## 2023-12-06 PROCEDURE — 84146 ASSAY OF PROLACTIN: CPT

## 2023-12-06 PROCEDURE — 36415 COLL VENOUS BLD VENIPUNCTURE: CPT

## 2023-12-06 PROCEDURE — 83036 HEMOGLOBIN GLYCOSYLATED A1C: CPT

## 2023-12-06 PROCEDURE — 80061 LIPID PANEL: CPT

## 2023-12-06 PROCEDURE — 82947 ASSAY GLUCOSE BLOOD QUANT: CPT

## 2023-12-06 PROCEDURE — 80178 ASSAY OF LITHIUM: CPT

## 2023-12-06 PROCEDURE — 85025 COMPLETE CBC W/AUTO DIFF WBC: CPT

## 2023-12-07 ENCOUNTER — VIRTUAL VISIT (OUTPATIENT)
Dept: FAMILY MEDICINE | Facility: CLINIC | Age: 38
End: 2023-12-07
Payer: COMMERCIAL

## 2023-12-07 DIAGNOSIS — R21 RASH: ICD-10-CM

## 2023-12-07 DIAGNOSIS — R53.83 FATIGUE, UNSPECIFIED TYPE: Primary | ICD-10-CM

## 2023-12-07 PROCEDURE — 99213 OFFICE O/P EST LOW 20 MIN: CPT | Mod: VID | Performed by: NURSE PRACTITIONER

## 2023-12-07 RX ORDER — VENLAFAXINE HYDROCHLORIDE 75 MG/1
CAPSULE, EXTENDED RELEASE ORAL DAILY
COMMUNITY
Start: 2023-11-19

## 2023-12-07 RX ORDER — NALTREXONE HYDROCHLORIDE 50 MG/1
0.5 TABLET, FILM COATED ORAL
COMMUNITY
Start: 2023-11-22

## 2023-12-07 RX ORDER — PROPRANOLOL HYDROCHLORIDE 10 MG/1
10 TABLET ORAL 3 TIMES DAILY PRN
COMMUNITY
Start: 2023-10-04 | End: 2024-08-02

## 2023-12-07 NOTE — PROGRESS NOTES
Maddy is a 38 year old who is being evaluated via a billable video visit.      How would you like to obtain your AVS? MyChart  If the video visit is dropped, the invitation should be resent by: Text to cell phone: [phone]  Will anyone else be joining your video visit? No          Assessment & Plan     Fatigue, unspecified type  Etiology unclear.  Labs draw by psychiatric NP yesterday - reviewed. No cause of fatigue revealed on the lab work.  Likely related to her medications - this has happened in the past  She has an appointment with her psych NP tomorrow.      The risks, benefits and treatment options of prescribed medications or other treatments have been discussed with the patient. The patient verbalized their understanding and should call or follow up if no improvement or if they develop further problems.  PHILL Luo [facility]   Maddy is a 38 year old, presenting for the following health issues:  Fatigue        12/7/2023     2:26 PM   Additional Questions   Roomed by lisa de la garza       [facility]     Concern - fatigue  Onset: for a while, but worsening recently  Description: i am very tired, fatigued or sedated during the day.  i have an appointment with the psych meds in case i am sedated.  Intensity: moderate  Progression of Symptoms:  worsening  Accompanying Signs & Symptoms: feeling lazy, difficult time staying up during the day  Has more energy at night- more difficulty falling asleep at night.  Not feeling like herself  Napping during the day  Previous history of similar problem: yes, but worse lately  Precipitating factors:        Worsened by: possibly her meds?  Alleviating factors:        Improved by: melatonin helps her sleep   Therapies tried and outcome: tried changing around her medications  Checking her blood sugar-  average 95-96  Took two doses of melatonin last night - melatonin helps her sleep at night.            Review of  Systems   Constitutional, HEENT, cardiovascular, pulmonary, gi and gu systems are negative, except as otherwise noted.      Objective           Vitals:  No vitals were obtained today due to virtual visit.    Physical Exam   GENERAL: Healthy, alert and no distress  EYES: Eyes grossly normal to inspection.  No discharge or erythema, or obvious scleral/conjunctival abnormalities.  RESP: No audible wheeze, cough, or visible cyanosis.  No visible retractions or increased work of breathing.    SKIN: Visible skin clear. No significant rash, abnormal pigmentation or lesions.  NEURO: Cranial nerves grossly intact.  Mentation and speech appropriate for age.  PSYCH: Mentation appears normal, affect normal/bright, judgement and insight intact, normal speech and appearance well-groomed.                Video-Visit Details    Type of service:  Video Visit   Video Start Time:  2:43pm  Video End Time:2:55 PM    Originating Location (pt. Location): Home    Distant Location (provider location):  On-site  Platform used for Video Visit: Perri

## 2023-12-08 ENCOUNTER — TRANSFERRED RECORDS (OUTPATIENT)
Dept: HEALTH INFORMATION MANAGEMENT | Facility: CLINIC | Age: 38
End: 2023-12-08

## 2023-12-08 LAB — PHQ9 SCORE: 10

## 2023-12-08 RX ORDER — TRIAMCINOLONE ACETONIDE 1 MG/G
OINTMENT TOPICAL 2 TIMES DAILY
Qty: 30 G | Refills: 1 | Status: SHIPPED | OUTPATIENT
Start: 2023-12-08 | End: 2024-08-02

## 2023-12-15 ENCOUNTER — THERAPY VISIT (OUTPATIENT)
Dept: PHYSICAL THERAPY | Facility: CLINIC | Age: 38
End: 2023-12-15
Attending: NURSE PRACTITIONER
Payer: COMMERCIAL

## 2023-12-15 DIAGNOSIS — M54.2 NECK PAIN: Primary | ICD-10-CM

## 2023-12-15 DIAGNOSIS — M25.562 CHRONIC PAIN OF BOTH KNEES: ICD-10-CM

## 2023-12-15 DIAGNOSIS — M25.561 CHRONIC PAIN OF BOTH KNEES: ICD-10-CM

## 2023-12-15 DIAGNOSIS — G89.29 CHRONIC PAIN OF BOTH KNEES: ICD-10-CM

## 2023-12-15 DIAGNOSIS — M25.551 HIP PAIN, RIGHT: ICD-10-CM

## 2023-12-15 PROCEDURE — 97110 THERAPEUTIC EXERCISES: CPT | Mod: GP | Performed by: PHYSICAL THERAPIST

## 2023-12-15 PROCEDURE — 97140 MANUAL THERAPY 1/> REGIONS: CPT | Mod: GP | Performed by: PHYSICAL THERAPIST

## 2023-12-15 NOTE — PROGRESS NOTES
DISCHARGE  Reason for Discharge: Patient has failed to schedule further appointments. Pt has left Webstephart  messages on her status, no further appts have been made    Equipment Issued:     Discharge Plan: Patient to continue home program.    Referring Provider:  Shirley BERRY [facility]                                                                                   OUTPATIENT PHYSICAL THERAPY    PLAN OF TREATMENT FOR OUTPATIENT REHABILITATION   Patient's Last Name, First Name, Maddy Guardado YOB: 1985   Provider's Name   [facility]   Medical Record No.  [id number]     Onset Date: 09/20/23  Start of Care Date: 10/06/23     Medical Diagnosis:  neck pain, R hip pain, knee pain      PT Treatment Diagnosis:  R neck, upper trap pain, cervical tightness, poor posture, R hip pain, L knee pain Plan of Treatment  Frequency/Duration: 1x/wk to every other week/ 6wk    Certification date from 12/01/23 to 01/12/24         See note for plan of treatment details and functional goals     Kris Hoenk, PT                         I CERTIFY THE NEED FOR THESE SERVICES FURNISHED UNDER        THIS PLAN OF TREATMENT AND WHILE UNDER MY CARE     (Physician attestation of this document indicates review and certification of the therapy plan).              Referring Provider:  Shirley Freeman    Initial Assessment  See Epic Evaluation- Start of Care Date: 10/06/23           12/15/23 0500   Appointment Info   Signing clinician's name / credentials Kris Hoenk, PT   Visits Used 4   Medical Diagnosis neck pain, R hip pain, knee pain   PT Tx Diagnosis R neck, upper trap pain, cervical tightness, poor posture, R hip pain, L knee pain   Quick Adds Certification   Progress Note/Certification   Start of Care Date 10/06/23   Onset of illness/injury or Date of Surgery 09/20/23   Therapy Frequency 1x/wk to every other week   Predicted Duration 6wk    Certification date from 12/01/23   Certification date to 01/12/24   GOALS   PT Goals 2   PT Goal 1   Goal Identifier 1 still valid, extend date 1/12   Goal Description pt will have less neck pain with daily activity in 8wk   Goal Progress tight to turn head right   Target Date 12/01/23   PT Goal 2   Goal Identifier 2   Goal Description pt will be able to do stairs w/o L knee pain in 8wk   Goal Progress did not assess knee today, no complaints offered   Target Date 12/01/23   Subjective Report   Subjective Report returns after 5wks , looking for recheck on her back, any  new exercises, doesn't feel right by the R shoulder blade, has been going to gym, doing ex videos at home, feels something still wrong with spine, pt had sent PT messages that she was doing better inbetween visits   Objective Measures   Objective Measures Objective Measure 1   Objective Measure 1   Objective Measure CROM WFL except R ROT 75% pain,tight R, tight R up trap,levator, hypomob upper T spine   Treatment Interventions (PT)   Interventions Manual Therapy;Therapeutic Procedure/Exercise   Therapeutic Procedure/Exercise   Therapeutic Procedures: strength, endurance, ROM, flexibillity minutes (25291) 10   Ther Proc 1 - Details review and did UB ex: increase to red TB rows x15, shld extn x15, B ER x15, remind neck stretches   Skilled Intervention ROM, stretches to begin HEP, reduce sx, increase activity tolerance   Manual Therapy   Manual Therapy: Mobilization, MFR, MLD, friction massage minutes (13684) 15   Manual Therapy 1 - Details seated direct R rib 1-2 inf mob, prone PA T1-3 repeated oscillations, general T spine PA t4-6, costoovert pA 1-4 B, STM TR up trap,levator   Skilled Intervention STM ,jt mob for mobility,pain   Education   Learner/Method Patient;Caregiver;Demonstration;Pictures/Video;No Barriers to Learning   Education Comments discussed ex videos, yoga, light aerobic, walking in place videos   Plan   Plan for next session genoveva  resume PT 1x/wk x6wk, work on neck tightness, ROM, recheck knee next visit   Total Session Time   Timed Code Treatment Minutes 25   Total Treatment Time (sum of timed and untimed services) 25     [facility]  Kris Hoenk  PT  [facility]  [address] [address]  [email or web address]   Office: [phone]  Voicemail: [phone]

## 2023-12-20 ENCOUNTER — THERAPY VISIT (OUTPATIENT)
Dept: SPEECH THERAPY | Facility: CLINIC | Age: 38
End: 2023-12-20
Attending: NURSE PRACTITIONER
Payer: COMMERCIAL

## 2023-12-20 DIAGNOSIS — F80.9: ICD-10-CM

## 2023-12-20 PROCEDURE — 92507 TX SP LANG VOICE COMM INDIV: CPT | Mod: GN | Performed by: SPEECH-LANGUAGE PATHOLOGIST

## 2023-12-21 ENCOUNTER — TRANSFERRED RECORDS (OUTPATIENT)
Dept: HEALTH INFORMATION MANAGEMENT | Facility: CLINIC | Age: 38
End: 2023-12-21
Payer: COMMERCIAL

## 2024-01-03 ENCOUNTER — VIRTUAL VISIT (OUTPATIENT)
Dept: FAMILY MEDICINE | Facility: CLINIC | Age: 39
End: 2024-01-03
Payer: COMMERCIAL

## 2024-01-03 DIAGNOSIS — M54.9 CHRONIC RIGHT-SIDED BACK PAIN, UNSPECIFIED BACK LOCATION: Primary | ICD-10-CM

## 2024-01-03 DIAGNOSIS — G89.29 CHRONIC RIGHT-SIDED BACK PAIN, UNSPECIFIED BACK LOCATION: Primary | ICD-10-CM

## 2024-01-03 DIAGNOSIS — M54.2 NECK PAIN: ICD-10-CM

## 2024-01-03 PROCEDURE — 99213 OFFICE O/P EST LOW 20 MIN: CPT | Mod: 95 | Performed by: NURSE PRACTITIONER

## 2024-01-03 ASSESSMENT — ENCOUNTER SYMPTOMS: BACK PAIN: 1

## 2024-01-03 ASSESSMENT — PATIENT HEALTH QUESTIONNAIRE - PHQ9
SUM OF ALL RESPONSES TO PHQ QUESTIONS 1-9: 7
SUM OF ALL RESPONSES TO PHQ QUESTIONS 1-9: 7
10. IF YOU CHECKED OFF ANY PROBLEMS, HOW DIFFICULT HAVE THESE PROBLEMS MADE IT FOR YOU TO DO YOUR WORK, TAKE CARE OF THINGS AT HOME, OR GET ALONG WITH OTHER PEOPLE: VERY DIFFICULT

## 2024-01-03 NOTE — COMMUNITY RESOURCES LIST (ENGLISH)
01/03/2024   [facility] - Outpatient Clinics  N/A  For additional resource needs, please contact your health insurance member services or your primary care team.  Phone: [phone]   Email: N/A   Address: [address] [place] [address]   Hours: N/A        Financial Stability       Rent and mortgage payment assistance  1  [employer] Distance: 0.24 miles      In-Person, Phone/Virtual   [address], [place] [address]  Language: English  Hours: Mon - Sun Appt. Only  Fees: Free   Phone: (680) 678-6406 Email: [email or web address] Website: http://www.Care-n-Share.org     2  [facility] - Rent payment assistance Distance: 2.39 miles      In-Person, Phone/Virtual   [address] [place], [place] [address]  Language: English  Hours: Mon - Fri 8:00 AM - 4:30 PM  Fees: Free   Phone: (183) 856-4375 Email: abelino@[facility]. Website: https://www.[facility]./Facilities/Facility/Details/23          Food and Nutrition       Food pantry  3  [employer] - Food Shelf Distance: 0.24 miles      In-Person   [address] [place] [address]  Language: English  Hours: Mon - Sun Appt. Only  Fees: Free   Phone: (150) 231-1657 Email: Neitui@[employer].com Website: http://www.Care-n-Share.org     4  [facility] - Dry Food Pantry Distance: 1.69 miles      Pickup   [address] [place] [address]  Language: English  Hours: Mon - Fri 9:00 AM - 3:00 PM  Fees: Free   Phone: (929) 198-2773 Email: office@[employer] Website: http://www.[employer].net/     SNAP application assistance  5  [employer] [place] Distance: 22.31 miles      Phone/Virtual   [address] [address]  Language: English, Hmong, Peruvian, Botswanan, Bolivian  Hours: Mon - Fri 8:30 AM - 4:30 PM  Fees: Free   Phone: (427) 941-6986 Email: helpline@[employer].org Website:  https://www.hungersolutions.org/programs/[place]-food-helpline/     6  [facility] Distance: 2.39 miles      In-Person, Phone/Virtual   [address] [place] [address]  Language: English  Hours: Mon - Fri 8:00 AM - 4:30 PM  Fees: Free   Phone: (584) 172-2260 Email: abelino@[facility]. Website: https://www.[facility]./Facilities/Facility/Details/23     Soup kitchen or free meals  7  [facility] - Homeless Outreach Services Team - Food Assistance Distance: 14.94 miles      Pickup   [address] [place] [address]  Language: English  Hours: Mon - Thu 9:30 AM - 3:30 PM  Fees: Free   Phone: (493) 490-9473 Email: office@[employer] Website: https://www.saintandrews.[employer]/[facility]-resource-[place]/     8   - [facility] - Thursday Night Community Meal Distance: 15.04 miles      In-Person   [address] [place] [address]  Language: English, Belarusian  Hours: Thu 6:00 PM - 7:00 PM  Fees: Free   Phone: (617) 659-2636 Email: center@[employer].[facility] Website: https://www.saintandrews.[employer]/[facility]-resource-[place]/          Transportation       Free or low-cost transportation  9  [facility] Bus Loop - Free or low-cost transportation Distance: 15.25 miles      In-Person   [address] [place] [address]  Language: English  Hours: Mon - Fri 9:00 AM - 5:00 PM  Fees: Free   Phone: (857) 715-9990 Email: info@[employer] Website: https://www.[employer]iasmart/     10  [employer] Distance: 23.38 miles      In-Person   [address] [place] [address]  Language: English, Belarusian  Hours: Mon 9:00 AM - 5:00 PM , Tue 9:30 AM - 7:00 PM , Wed 9:00 AM - 5:00 PM , Thu 9:30 AM - 7:00 PM , Fri 9:00 AM - 5:00 PM  Fees: Free   Phone: (693) 703-8665 Email: contactus@[employer] Website: http://www.Friend.ly     Transportation to medical appointments  11  Kathi [employer]  Distance: 18.77 miles      In-Person   [address] [address]  Language: English  Hours: Mon - Thu 6:00 AM - 6:00 PM , Fri 6:00 AM - 5:00 PM  Fees: Insurance, Self Pay   Phone: (858) 145-5220 Email: office@[employer] Website: https://www.[employer]/     12  [facility]   Family Wellness (AIFW) Distance: 20.11 miles      In-Person   [address] [place] [address]  Language: Yoruba, Maltese, English, Gujarati, Annalee, Danish, Kazakh, Maltese, Persian, Sami  Hours: Mon - Wed 9:00 AM - 5:00 PM , Thu 12:00 PM - 6:00 PM , Fri 9:00 AM - 5:00 PM , Sun 10:30 AM - 2:00 PM Appt. Only  Fees: Free   Phone: (542) 192-5999 Email: info@[facility]-aifw.org Website: https://www.[facility]-aifw.org/          Important Numbers & Websites       [address].org  Poison Control   (681) 133-4916 [place]  Suicide and Crisis Lifeline   [address] [place], [place] [address]line.org  Childhelp [place] Child Abuse Hotline   296.393.8913 Childhelphotline.org  [place] Sexual Assault Hotline   (125) 611-8882 (HOPE) Rainn.org  National Runaway Safeline   (637) 538-4962 (RUNAWAY) [facility] Healthcarerunaway.org  Pregnancy & Postpartum Support [place]   Call/text 424-901-9255 Ppsupportmn.org  Substance Abuse National Helpline ([facility]   529-045-HELP (1235) Findtreatment.gov  Emergency Services   911

## 2024-01-03 NOTE — PROGRESS NOTES
Maddy is a 38 year old who is being evaluated via a billable video visit.      How would you like to obtain your AVS? MyChart  If the video visit is dropped, the invitation should be resent by: Text to cell phone: [phone]  Will anyone else be joining your video visit? No          Assessment & Plan     Chronic right-sided back pain, unspecified back location  - Chiropractic Referral; Future    Neck pain  - Chiropractic Referral; Future      The risks, benefits and treatment options of prescribed medications or other treatments have been discussed with the patient. The patient verbalized their understanding and should call or follow up if no improvement or if they develop further problems.  PHILL Luo CNP  [facility]            Subjective   Maddy is a 38 year old, presenting for the following health issues:  Back Pain (Would like orders Imaging for Back to see if condition is improving )        1/3/2024     2:02 PM   Additional Questions   Roomed by Eduardo MACIAS CMA   Accompanied by self     Would like to discuss getting new Updated Imaging for Back/neck .       Back Pain     History of Present Illness       Back Pain:  She presents for follow up of back pain. Patient's back pain is a chronic problem.  Location of back pain:  Right middle of back, left middle of back, right shoulder, left shoulder and other  Description of back pain: dull ache  Back pain spreads: nowhere    Since patient first noticed back pain, pain is: always present, but gets better and worse  Does back pain interfere with her job:  Not applicable       She eats 2-3 servings of fruits and vegetables daily.She consumes 1 sweetened beverage(s) daily.She exercises with enough effort to increase her heart rate 60 or more minutes per day.  She exercises with enough effort to increase her heart rate 3 or less days per week. She is missing 1 dose(s) of medications per week.  She is not taking prescribed medications  "regularly due to remembering to take.       \"I'm not sure I'm getting better\"  Wants to see a chiropractor.  Has been going to PT  Has had some improvement in pain, but it's ongoing.      Pain is located in her neck and all along the right side of her back.                Review of Systems   Musculoskeletal:  Positive for back pain.      Constitutional, HEENT, cardiovascular, pulmonary, gi and gu systems are negative, except as otherwise noted.      Objective    Vitals - Patient Reported  Pain Score: No Pain (0)        Physical Exam   GENERAL: Healthy, alert and no distress  EYES: Eyes grossly normal to inspection.  No discharge or erythema, or obvious scleral/conjunctival abnormalities.  RESP: No audible wheeze, cough, or visible cyanosis.  No visible retractions or increased work of breathing.    SKIN: Visible skin clear. No significant rash, abnormal pigmentation or lesions.  NEURO: Cranial nerves grossly intact.  Mentation and speech appropriate for age.  PSYCH: Mentation appears normal, affect normal/bright, judgement and insight intact, normal speech and appearance well-groomed.                Video-Visit Details    Type of service:  Video Visit   Video Start Time: 2:30 PM  Video End Time:2:41 PM    Originating Location (pt. Location): Home    Distant Location (provider location):  On-site  Platform used for Video Visit: Perri      "

## 2024-01-05 ENCOUNTER — TRANSFERRED RECORDS (OUTPATIENT)
Dept: HEALTH INFORMATION MANAGEMENT | Facility: CLINIC | Age: 39
End: 2024-01-05
Payer: COMMERCIAL

## 2024-01-05 LAB — PHQ9 SCORE: 9

## 2024-01-09 ENCOUNTER — VIRTUAL VISIT (OUTPATIENT)
Dept: PHYSICAL THERAPY | Facility: CLINIC | Age: 39
End: 2024-01-09
Attending: NURSE PRACTITIONER
Payer: COMMERCIAL

## 2024-01-09 DIAGNOSIS — R32 URINARY INCONTINENCE, UNSPECIFIED TYPE: Primary | ICD-10-CM

## 2024-01-09 PROCEDURE — 97110 THERAPEUTIC EXERCISES: CPT | Mod: GP,95 | Performed by: PHYSICAL THERAPIST

## 2024-01-09 PROCEDURE — 97535 SELF CARE MNGMENT TRAINING: CPT | Mod: GP,GT | Performed by: PHYSICAL THERAPIST

## 2024-01-09 NOTE — PROGRESS NOTES
Maddy Ortiz is a 38 year old female who is being seen via a billable video visit.      Patient has given verbal consent for Video visit? Yes    Video Start Time: 10:01 am to 10: 47am    Telehealth Visit Details    Type of Service:  Telehealth    Video End Time (time video stopped): 10:47am    Originating Location (pt. location): Home    Additional Participants in Telehealth Visit: None    Distant Location (provider location):   [facility]    Mode of Communication (Audio Visual or Audio Only):  Audio and Visual    Renee Sutton, PT  January 9, 2024

## 2024-01-11 ENCOUNTER — TRANSFERRED RECORDS (OUTPATIENT)
Dept: HEALTH INFORMATION MANAGEMENT | Facility: CLINIC | Age: 39
End: 2024-01-11
Payer: COMMERCIAL

## 2024-01-11 ENCOUNTER — TELEPHONE (OUTPATIENT)
Dept: FAMILY MEDICINE | Facility: CLINIC | Age: 39
End: 2024-01-11
Payer: COMMERCIAL

## 2024-01-11 LAB — PHQ9 SCORE: 4

## 2024-01-11 NOTE — TELEPHONE ENCOUNTER
Patient called the clinic today with a request for an order.   Patient was supposed to get her Risperidone injection today. Her home nurse who usually gives it was ill today. The home care agency did not and ,per patient, is not going to send a different nurse out.  Patient was hoping to get her risperidone during a nurse visit if a provider placed an order.   Patient picks her medication up from [facility] near the [facility]. RN explained that typically injections given by an RN in the clinic cannot come from an outside source. It usually needs to be ordered from [employer].     Routing to [place] nurses to confirm this is also their policy.     Please follow up with patient.   Lexie Pabon RN on 1/11/2024 at 4:49 PM

## 2024-01-11 NOTE — TELEPHONE ENCOUNTER
Returned call to patient. She reports she was able to get a Home Care RN out to her home to administer the injection.     Rossy Mosher RN

## 2024-01-12 ENCOUNTER — VIRTUAL VISIT (OUTPATIENT)
Dept: SURGERY | Facility: CLINIC | Age: 39
End: 2024-01-12
Payer: COMMERCIAL

## 2024-01-12 DIAGNOSIS — E11.9 TYPE 2 DIABETES MELLITUS WITHOUT COMPLICATION, WITHOUT LONG-TERM CURRENT USE OF INSULIN (H): ICD-10-CM

## 2024-01-12 DIAGNOSIS — E66.9 OBESITY (BMI 30-39.9): Primary | ICD-10-CM

## 2024-01-12 PROCEDURE — 97803 MED NUTRITION INDIV SUBSEQ: CPT | Mod: 95 | Performed by: DIETITIAN, REGISTERED

## 2024-01-12 NOTE — LETTER
1/12/2024         RE: Maddy Ortiz  [address]  [facility] [address]        Dear Colleague,    Thank you for referring your patient, Maddy Ortiz, to the [facility] [place]. Please see a copy of my visit note below.    Maddy Ortiz is a 38 year old who is being evaluated via a billable video visit.      Medical  Weight Loss Follow-Up Diet Evaluation  Assessment:  Maddy is presenting today for a follow up weight management nutrition consultation. Pt has had an initial appointment with Dr. Serna  Personal goals: lose weight to have umbilical hernia fixed - needs to get to 200 lbs  Weight loss medication:  victoza  Pt's weight is 227 lb   Initial weight: 202 lb  Weight change: down 26 lbs from high of 253 lbs        5/28/2022     4:35 AM   Changes and Difficulties   I have made the following changes to my diet since my last visit: meal planning   I have made the following changes to my activity/exercise since my last visit: active physical activity   With regards to my activity/exercise, I am still struggling with: rest - tend to work hard 1 week rest the next week     BMI: There is no height or weight on file to calculate BMI.  Ideal body weight: 52.4 kg (115 lb 8.3 oz)  Adjusted ideal body weight: 74.3 kg (163 lb 11.4 oz)    Estimated RMR ([place]-St Jeor equation):   1727 kcals x 1.2 (sedentary) = 2072 kcals (for weight maintenance)  Recommended Protein Intake: 60-80 grams of protein/day  Patient Active Problem List:  Patient Active Problem List   Diagnosis     Animal dander allergy     CARDIOVASCULAR SCREENING; LDL GOAL LESS THAN 160     Psychosis (H)     Paranoid type delusional disorder (H)     Bipolar 1 disorder (H)     Insomnia     Depression with anxiety     Seasonal allergic rhinitis due to pollen     Allergic rhinitis due to mold     Allergic rhinitis due to animal dander     Allergic rhinitis due to dust mite     Encounter for IUD  insertion     Schizoaffective disorder, bipolar type (H)     Gastroesophageal reflux disease without esophagitis     Paranoia (psychosis) (H)     Morbid obesity (H)     Schizoaffective disorder (H)     Umbilical hernia without obstruction and without gangrene     Fatty liver     Type 2 diabetes mellitus without complication, without long-term current use of insulin (H)     Elevated LFTs     Disorganized behavior     Infection due to 2019 novel coronavirus     Polypharmacy     Sedated due to multiple medications     Overdose, undetermined intent, initial encounter     Segmental and somatic dysfunction     SI (sacroiliac) joint dysfunction     mirena IUD 9/30/22     Neck pain     Hip pain, right     Chronic pain of both knees     Difficulty communicating     Urinary incontinence, unspecified type     Diabetes:   Most recent A1c 4.9 in December  Progress on goals from last visit:  She was very drowsy during today's visit and states that she has been very tired the last couple days. She believes it's her medications making her sleepy. She has started doing PT exercises, every day. She continues to eat 3 meals per day.  Eat breakfast daily/3 meals per day - met  Keep food journal   Increase water intake (buy plastic cups) - avoid soda - not fully met, water intake has increased  Continue going to the [employer] - not met    Dietary Recall:  Breakfast: 9 am Cereal (cinnamon toast crunch) and lactaid milk  Lunch: 12-2 pm [place] OR Mom's meal. (Not in the last couple days)   Dinner: bean and hamburger taco mix OR Mom's meal  Beverages:   Water: some times, planning to get plastic cups to increase intake. Her cups at home taste like soap, 4-6 cups per day   Regular soda - for caffeine to help with drowsiness, 2 cans per day  Exercise:   [employer] - elliptical, treadmill, swimming, bicycle - not recently   Nutrition Diagnosis:    Obesity related to overeating and poor lifestyle habits as evidenced by patient's report of limited  budget, inconsistent meals, large portions, frequent snacking of sweets, lack of activity and BMI 39.15    Intervention:  Food and/or nutrient delivery: Prioritize protein and fiber foods at meals, continue to aim for 3 meals per day, avoid skipping meals  Nutrition counseling: discussed meal ideas to increase protein and fiber, reviewed goals    Monitoring/Evaluation:    Goals:  Eat breakfast daily/3 meals per day  Keep food journal   Increase water intake (buy plastic cups) - avoid soda  Continue going to the [facility]    Patient to follow up in 2 month(s) with ELVIA    Video-Visit Details    Type of service:  Video Visit    Video Start Time (time video started): 11:28 am    Video End Time (time video stopped): 11:38 am    Originating Location (pt. Location): Home        Distant Location (provider location):  On-site    Mode of Communication:  Video Conference via [employer]    Physician has received verbal consent for a Video Visit from the patient? Yes      Angeli Domingo      Again, thank you for allowing me to participate in the care of your patient.        Sincerely,        Angeli Domingo RD

## 2024-01-12 NOTE — PROGRESS NOTES
Maddy Ortiz is a 38 year old who is being evaluated via a billable video visit.      Medical  Weight Loss Follow-Up Diet Evaluation  Assessment:  Maddy is presenting today for a follow up weight management nutrition consultation. Pt has had an initial appointment with Dr. Serna  Personal goals: lose weight to have umbilical hernia fixed - needs to get to 200 lbs  Weight loss medication:  victoza  Pt's weight is 227 lb   Initial weight: 202 lb  Weight change: down 26 lbs from high of 253 lbs        5/28/2022     4:35 AM   Changes and Difficulties   I have made the following changes to my diet since my last visit: meal planning   I have made the following changes to my activity/exercise since my last visit: active physical activity   With regards to my activity/exercise, I am still struggling with: rest - tend to work hard 1 week rest the next week     BMI: There is no height or weight on file to calculate BMI.  Ideal body weight: 52.4 kg (115 lb 8.3 oz)  Adjusted ideal body weight: 74.3 kg (163 lb 11.4 oz)    Estimated RMR ([place]-St Jeor equation):   1727 kcals x 1.2 (sedentary) = 2072 kcals (for weight maintenance)  Recommended Protein Intake: 60-80 grams of protein/day  Patient Active Problem List:  Patient Active Problem List   Diagnosis    Animal dander allergy    CARDIOVASCULAR SCREENING; LDL GOAL LESS THAN 160    Psychosis (H)    Paranoid type delusional disorder (H)    Bipolar 1 disorder (H)    Insomnia    Depression with anxiety    Seasonal allergic rhinitis due to pollen    Allergic rhinitis due to mold    Allergic rhinitis due to animal dander    Allergic rhinitis due to dust mite    Encounter for IUD insertion    Schizoaffective disorder, bipolar type (H)    Gastroesophageal reflux disease without esophagitis    Paranoia (psychosis) (H)    Morbid obesity (H)    Schizoaffective disorder (H)    Umbilical hernia without obstruction and without gangrene    Fatty liver    Type 2 diabetes mellitus without  complication, without long-term current use of insulin (H)    Elevated LFTs    Disorganized behavior    Infection due to 2019 novel coronavirus    Polypharmacy    Sedated due to multiple medications    Overdose, undetermined intent, initial encounter    Segmental and somatic dysfunction    SI (sacroiliac) joint dysfunction    mirena IUD 9/30/22    Neck pain    Hip pain, right    Chronic pain of both knees    Difficulty communicating    Urinary incontinence, unspecified type     Diabetes:   Most recent A1c 4.9 in December  Progress on goals from last visit:  She was very drowsy during today's visit and states that she has been very tired the last couple days. She believes it's her medications making her sleepy. She has started doing PT exercises, every day. She continues to eat 3 meals per day.  Eat breakfast daily/3 meals per day - met  Keep food journal   Increase water intake (buy plastic cups) - avoid soda - not fully met, water intake has increased  Continue going to the [place] - not met    Dietary Recall:  Breakfast: 9 am Cereal (cinnamon toast crunch) and lactaid milk  Lunch: 12-2 pm [place] OR Mom's meal. (Not in the last couple days)   Dinner: bean and hamburger taco mix OR Mom's meal  Beverages:   Water: some times, planning to get plastic cups to increase intake. Her cups at home taste like soap, 4-6 cups per day   Regular soda - for caffeine to help with drowsiness, 2 cans per day  Exercise:   [employer] - elliptical, treadmill, swimming, bicycle - not recently   Nutrition Diagnosis:    Obesity related to overeating and poor lifestyle habits as evidenced by patient's report of limited budget, inconsistent meals, large portions, frequent snacking of sweets, lack of activity and BMI 39.15    Intervention:  Food and/or nutrient delivery: Prioritize protein and fiber foods at meals, continue to aim for 3 meals per day, avoid skipping meals  Nutrition counseling: discussed meal ideas to increase protein and fiber,  reviewed goals    Monitoring/Evaluation:    Goals:  Eat breakfast daily/3 meals per day  Keep food journal   Increase water intake (buy plastic cups) - avoid soda  Continue going to the [facility]    Patient to follow up in 2 month(s) with RD    Video-Visit Details    Type of service:  Video Visit    Video Start Time (time video started): 11:28 am    Video End Time (time video stopped): 11:38 am    Originating Location (pt. Location): Home        Distant Location (provider location):  On-site    Mode of Communication:  Video Conference via [facility]    Physician has received verbal consent for a Video Visit from the patient? Yes      Angeli Domingo

## 2024-01-14 ENCOUNTER — HEALTH MAINTENANCE LETTER (OUTPATIENT)
Age: 39
End: 2024-01-14

## 2024-01-17 DIAGNOSIS — E11.9 TYPE 2 DIABETES MELLITUS WITHOUT COMPLICATION, WITHOUT LONG-TERM CURRENT USE OF INSULIN (H): ICD-10-CM

## 2024-01-17 LAB — PHQ9 SCORE: 9

## 2024-01-18 RX ORDER — LIRAGLUTIDE 6 MG/ML
INJECTION SUBCUTANEOUS
Qty: 9 ML | Refills: 3 | Status: SHIPPED | OUTPATIENT
Start: 2024-01-18 | End: 2024-06-11

## 2024-01-26 ENCOUNTER — OFFICE VISIT (OUTPATIENT)
Dept: ALLERGY | Facility: CLINIC | Age: 39
End: 2024-01-26
Payer: COMMERCIAL

## 2024-01-26 VITALS
HEART RATE: 75 BPM | OXYGEN SATURATION: 96 % | TEMPERATURE: 98.3 F | DIASTOLIC BLOOD PRESSURE: 83 MMHG | WEIGHT: 229.8 LBS | SYSTOLIC BLOOD PRESSURE: 120 MMHG | BODY MASS INDEX: 40.71 KG/M2

## 2024-01-26 DIAGNOSIS — J30.89 ALLERGIC RHINITIS CAUSED BY MOLD: ICD-10-CM

## 2024-01-26 DIAGNOSIS — H10.13 ALLERGIC CONJUNCTIVITIS OF BOTH EYES: ICD-10-CM

## 2024-01-26 DIAGNOSIS — J30.1 SEASONAL ALLERGIC RHINITIS DUE TO POLLEN: ICD-10-CM

## 2024-01-26 DIAGNOSIS — R06.02 SHORTNESS OF BREATH: ICD-10-CM

## 2024-01-26 DIAGNOSIS — J30.89 ALLERGIC RHINITIS DUE TO DUST MITE: ICD-10-CM

## 2024-01-26 DIAGNOSIS — J30.81 ALLERGIC RHINITIS DUE TO ANIMALS: Primary | ICD-10-CM

## 2024-01-26 DIAGNOSIS — R21 RASH: ICD-10-CM

## 2024-01-26 PROCEDURE — 99214 OFFICE O/P EST MOD 30 MIN: CPT | Performed by: ALLERGY & IMMUNOLOGY

## 2024-01-26 RX ORDER — AZELASTINE 1 MG/ML
2 SPRAY, METERED NASAL 2 TIMES DAILY PRN
Qty: 30 ML | Refills: 3 | Status: SHIPPED | OUTPATIENT
Start: 2024-01-26 | End: 2024-05-07

## 2024-01-26 RX ORDER — AZELASTINE HYDROCHLORIDE 0.5 MG/ML
1 SOLUTION/ DROPS OPHTHALMIC 2 TIMES DAILY PRN
Qty: 6 ML | Refills: 11 | Status: SHIPPED | OUTPATIENT
Start: 2024-01-26

## 2024-01-26 RX ORDER — BUDESONIDE AND FORMOTEROL FUMARATE DIHYDRATE 80; 4.5 UG/1; UG/1
2 AEROSOL RESPIRATORY (INHALATION) 2 TIMES DAILY
Qty: 10.2 G | Refills: 4 | Status: SHIPPED | OUTPATIENT
Start: 2024-01-26 | End: 2024-03-01

## 2024-01-26 RX ORDER — FLUTICASONE PROPIONATE 50 MCG
2 SPRAY, SUSPENSION (ML) NASAL DAILY
Qty: 15.8 ML | Refills: 11 | Status: SHIPPED | OUTPATIENT
Start: 2024-01-26

## 2024-01-26 ASSESSMENT — ENCOUNTER SYMPTOMS
SINUS PRESSURE: 0
CHEST TIGHTNESS: 0
WHEEZING: 0
RHINORRHEA: 0
COUGH: 0
EYE ITCHING: 0
SHORTNESS OF BREATH: 0
EYE REDNESS: 0
SORE THROAT: 0
EYE DISCHARGE: 0

## 2024-01-26 ASSESSMENT — ASTHMA QUESTIONNAIRES: ACT_TOTALSCORE: 22

## 2024-01-26 NOTE — PATIENT INSTRUCTIONS
Continue Symbicort 80/4.5 mcg 2 puffs once daily.  Depending on symptom control he could always increase Symbicort to 2 puffs twice a day.   Use albuterol inhaler 2 to 4 puffs every 4 hours as needed for wheezing, chest tightness, shortness of breath or persistent cough.     Continue with Flonase 1 to 2 sprays in each nostril once daily.  Try to use it consistently especially in the spring, summer, and fall.   Add azelastine 2 sprays in each nostril twice a day as needed.  If you still have issues with smell and do not tolerate azelastine well let us know, and I will send a prescription for Atrovent.  Continue Optivar eyedrops 1 drop in each eye twice a day as needed.     Next time he developed a rash, take pictures.  I may need to see you on that day, prescribe you a topical steroid, and may be even sending to the skin doctor.    Prescription Assistance  If you need assistance with your prescriptions (cost, coverage, etc) please contact: [place] [facility] [phone]           If labs have been ordered/completed, please allow 7-14 business days for final interpretation of results to be sent on My Chart, phone or mail. Some lab results can take up to 28 days for results.         Allergy Staff Appt Hours Shot Hours Locations    Physician      Rudy Shin MD         Support Staff      Callie Franco MA     Tuesday:   [place] 7-5 Wednesday:  [place]: 7-5 Thursday:         [place] 7-5 Friday:  [place] 7-3     [place]        Tuesday: 7- 4:20        Wednesday: 7-4:20      [place] Monday: 7-4:10        Tuesday: 7-4:10        Thursday: 7-3:10     [facility]       Tues & Wed: 7-5:10       Thurs: 12-4:10       Fri: 7-11            [facility]  [address] [place] [address]  Appt Line: [phone]        [facility]  [address] [place] [address]  Appt Line: [phone]     Pulmonary Function Scheduling:  [place]:  [phone]  [place]: [phone]  [phone]

## 2024-01-26 NOTE — LETTER
1/26/2024         RE: Maddy Ortiz  [address]  [facility] [address]        Dear Colleague,    Thank you for referring your patient, Maddy Ortiz, to the [facility]. Please see a copy of my visit note below.    SUBJECTIVE:                                                                   Maddy Ortiz presents today to our Allergy Clinic at [facility] for a follow up visit. She is a 38 year old female with allergic rhinitis and episodes of shortness of breath on exertion.     Percutaneous skin puncture testing for aeroallergens performed in November 2016 showed sensitivity to dog, cat, dust mite, molds, pollen of trees, grass, and weeds. In spring-summer 2018, she had a buildup using cluster schedule for allergen immunotherapy.  She reached maintenance dose in July 2018.  One episode of anaphylaxis on September 8, 2020, due to allergen immunotherapy, Red 1:1, 0.5mL   Was given epi x 2.  She tolerated a lower dose x 1 after that. She stopped taking hydroxyzine.  Take cetirizine 10 mg by mouth once daily.  Sometimes, when symptoms are worse, she may take 20 mg by mouth.  She stopped allergen immunotherapy after September 22, 2020.  She has a history of dyspnea on exertion. She was asked several times if albuterol inhaler was helpful or not, and she was not sure. In November, she had PFT, which showed a mild obstructive/restrictive pattern. There was a questionable postbronchodilator partial reversibility.    Maddy uses Symbicort 2 puffs once daily. She denies having shortness of breath, chest tightness or wheezing.  Has better sleep. She doesn't need to use albuterol. Walking is not an issue anymore. She lost some weight and she found it helpful. She has hernia which she waits to be operated.     She has been using intranasal fluticasone 2 sprays in each nostril once daily, she remember, 2-5 times a week.   She stopped all oral antihistamines.  She uses eye drops for red eyes. She thinks its Optivar, and the med is helpful.   She has no issues with nasal congestion, rhinorrhea or excessive sneezing. She doesn't remember if she had issues in Fall.        She is thinking about allergen immunotherapy again; however, she is not sure whether she wants her started considering her previous allergic reaction due to immunotherapy requiring EpiPen x2.     She started developing rashes on her lower extremities and arms, several times. Twice rash turned into a bruise. Rash persisted on the same spot for several days at least. Doesn't have any rash today.   Today, the patient is not interested in allergen immunotherapy unless it helps with the rash.  So far,she is satisfied with rhinoconjunctivitis symptom control.      Patient Active Problem List   Diagnosis     Animal dander allergy     CARDIOVASCULAR SCREENING; LDL GOAL LESS THAN 160     Psychosis (H)     Paranoid type delusional disorder (H)     Bipolar 1 disorder (H)     Insomnia     Depression with anxiety     Seasonal allergic rhinitis due to pollen     Allergic rhinitis due to mold     Allergic rhinitis due to animal dander     Allergic rhinitis due to dust mite     Encounter for IUD insertion     Schizoaffective disorder, bipolar type (H)     Gastroesophageal reflux disease without esophagitis     Paranoia (psychosis) (H)     Morbid obesity (H)     Schizoaffective disorder (H)     Umbilical hernia without obstruction and without gangrene     Fatty liver     Type 2 diabetes mellitus without complication, without long-term current use of insulin (H)     Elevated LFTs     Disorganized behavior     Infection due to 2019 novel coronavirus     Polypharmacy     Sedated due to multiple medications     Overdose, undetermined intent, initial encounter     Segmental and somatic dysfunction     SI (sacroiliac) joint dysfunction     mirena IUD 9/30/22     Neck pain     Hip pain, right     Chronic pain of both knees      Difficulty communicating     Urinary incontinence, unspecified type       Past Medical History:   Diagnosis Date     Bipolar 1 disorder (H) 12/6/2012     Depressive disorder      Diabetes mellitus, type 2 (H) 12/13/2021     Paranoid type delusional disorder (H) 11/14/2012      *Patient is Adopted           Problem (# of Occurrences) Relation (Name,Age of Onset)    Unknown/Adopted (3) Mother, Father, Other    Hypertension (1) Sister          Past Surgical History:   Procedure Laterality Date     TONSILLECTOMY & ADENOIDECTOMY      as a child     TONSILLECTOMY & ADENOIDECTOMY       Social History     Socioeconomic History     Marital status: Single     Spouse name: None     Number of children: None     Years of education: None     Highest education level: None   Occupational History     Employer: CURRENTLY UNEMPLOYED AT THIS TIME   Tobacco Use     Smoking status: Former     Packs/day: .5     Types: Cigarettes     Smokeless tobacco: Former     Tobacco comments:     around 2nd hand smoke   Vaping Use     Vaping Use: Never used   Substance and Sexual Activity     Alcohol use: Not Currently     Drug use: Not Currently     Sexual activity: Not Currently     Partners: Male     Birth control/protection: Abstinence, None   Other Topics Concern     Parent/sibling w/ CABG, MI or angioplasty before 65F 55M? No   Social History Narrative    One child    Education: some college        01/26/24            ENVIRONMENTAL HISTORY: [place] The home is heated with a electric furnace. They do not have central air conditioning, does have box air conditioner in the wall and has air purifier. The patient's bedroom is furnished with stuffed animals in bed, carpeting in bedroom and fabric window coverings. Pets inside the apartment includes 1 cat. There is history of cockroach or mice infestation. There are no smokers in the house.  The apartment does not have a basement.          Social  Determinants of Health     Financial Resource Strain: Low Risk  (1/3/2024)    Financial Resource Strain      Within the past 12 months, have you or your family members you live with been unable to get utilities (heat, electricity) when it was really needed?: No   Food Insecurity: High Risk (1/3/2024)    Food Insecurity      Within the past 12 months, did you worry that your food would run out before you got money to buy more?: Yes      Within the past 12 months, did the food you bought just not last and you didn t have money to get more?: No   Transportation Needs: High Risk (1/3/2024)    Transportation Needs      Within the past 12 months, has lack of transportation kept you from medical appointments, getting your medicines, non-medical meetings or appointments, work, or from getting things that you need?: Yes   Housing Stability: High Risk (1/3/2024)    Housing Stability      Do you have housing? : Yes      Are you worried about losing your housing?: Yes           Review of Systems   HENT:  Negative for congestion, nosebleeds, postnasal drip, rhinorrhea, sinus pressure, sneezing and sore throat.    Eyes:  Negative for discharge, redness and itching.   Respiratory:  Negative for cough, chest tightness, shortness of breath and wheezing.            Current Outpatient Medications:      ACCU-CHEK GUIDE test strip, USE TO TEST BLOOD SUGAR 6 TIMES DAILY OR AS DIRECTED, Disp: 600 strip, Rfl: 3     azelastine (ASTELIN) 0.1 % nasal spray, Spray 2 sprays into both nostrils 2 times daily as needed for rhinitis, Disp: 30 mL, Rfl: 3     azelastine (OPTIVAR) 0.05 % ophthalmic solution, Apply 1 drop to eye 2 times daily as needed (itchy/watery/red eyes), Disp: 6 mL, Rfl: 11     blood glucose (ONETOUCH VERIO IQ) test strip, Use to test blood sugar 2 times daily or as directed., Disp: 100 strip, Rfl: 11     Blood Glucose Monitoring Suppl (ONETOUCH VERIO FLEX SYSTEM) w/Device KIT, 1 each as needed, Disp: 1 kit, Rfl: 0      budesonide-formoterol (SYMBICORT) 80-4.5 MCG/ACT Inhaler, Inhale 2 puffs into the lungs 2 times daily, Disp: 10.2 g, Rfl: 4     fluticasone (FLONASE) 50 MCG/ACT nasal spray, Spray 2 sprays into both nostrils daily, Disp: 15.8 mL, Rfl: 11     gabapentin (NEURONTIN) 300 MG capsule, Take 2 capsules (600 mg) by mouth At Bedtime, Disp: 60 capsule, Rfl: 5     insulin pen needle (BD PEN NEEDLE ROZ 2ND GEN) 32G X 4 MM miscellaneous, USE 1 PEN NEEDLES DAILY WITH VICTOZA, Disp: 100 each, Rfl: 3     ketoconazole (NIZORAL) 2 % external cream, Apply topically 2 times daily X14 days, Disp: , Rfl:      lamoTRIgine (LAMICTAL) 200 MG tablet, Take 200 mg by mouth at bedtime Also 25mg in the AM and 25mg at llunch time., Disp: , Rfl:      levonorgestrel (MIRENA) 20 MCG/DAY IUD, 1 each (20 mcg) by Intrauterine route once, Disp: , Rfl:      levothyroxine (SYNTHROID/LEVOTHROID) 50 MCG tablet, TAKE 1 TABLET (50 MCG) BY MOUTH DAILY BEFORE BREAKFAST, Disp: 90 tablet, Rfl: 3     liraglutide (VICTOZA PEN) 18 MG/3ML solution, INJECT 1.8 MG UNDER THE SKIN ONCE DAILY, Disp: 9 mL, Rfl: 3     lithium ER (LITHOBID) 300 MG CR tablet, Take 300 mg by mouth daily, Disp: , Rfl:      Melatonin 10 MG TABS tablet, Take 10 mg by mouth at bedtime, Disp: , Rfl:      naltrexone (DEPADE/REVIA) 50 MG tablet, Take 0.5 tablets by mouth daily at 2 pm, Disp: , Rfl:      OneTouch Delica Lancets 33G MISC, 1 each 2 times daily, Disp: 100 each, Rfl: 11     propranolol (INDERAL) 10 MG tablet, Take 10 mg by mouth 3 times daily as needed, Disp: , Rfl:      risperiDONE microspheres ER (RISPERDAL CONSTA) 50 MG injection, Next due 6/30, Disp: , Rfl:      spacer (OPTICHAMBER APOLINAR) holding chamber, Use with Symbicort and albuterol inhalers as prescribed, Disp: 1 each, Rfl: 0     triamcinolone (KENALOG) 0.1 % external ointment, APPLY TOPICALLY 2 TIMES DAILY FOR UP TO 2 WEEKS, Disp: 30 g, Rfl: 1     venlafaxine (EFFEXOR XR) 75 MG 24 hr capsule, 150 mg daily, Disp: , Rfl:       vitamin D3 (CHOLECALCIFEROL) 50 mcg (2000 units) tablet, Take 1 tablet (50 mcg) by mouth daily, Disp: 90 tablet, Rfl: 3     acetaminophen (TYLENOL) 325 MG tablet, Take 2 tablets (650 mg) by mouth every 4 hours as needed for mild pain (to moderate pain) (Patient not taking: Reported on 1/26/2024), Disp: , Rfl:      albuterol (PROAIR HFA/PROVENTIL HFA/VENTOLIN HFA) 108 (90 Base) MCG/ACT inhaler, Inhale 2 puffs into the lungs every 4 hours as needed for wheezing or shortness of breath (Patient not taking: Reported on 1/26/2024), Disp: 18 g, Rfl: 1     ammonium lactate (LAC-HYDRIN) 12 % external cream, Apply topically 2 times daily as needed for dry skin (Patient not taking: Reported on 1/26/2024), Disp: 385 g, Rfl: 3     ibuprofen (ADVIL/MOTRIN) 200 MG tablet, Take 200 mg by mouth every 4 hours as needed for pain (Patient not taking: Reported on 1/26/2024), Disp: , Rfl:      lamoTRIgine (LAMICTAL) 25 MG tablet, TAKE 1 TABLET BY MOUTH TWICE A DAY IN THE MORNING AND AFTERNOON. (Patient not taking: Reported on 7/19/2023), Disp: , Rfl:      levocetirizine (XYZAL) 5 MG tablet, Take 1 tablet (5 mg) by mouth every evening (Patient not taking: Reported on 1/3/2024), Disp: 90 tablet, Rfl: 1     risperiDONE (RISPERDAL) 1 MG tablet, Take 1 mg by mouth at bedtime (Patient not taking: Reported on 1/26/2024), Disp: , Rfl:   Immunization History   Administered Date(s) Administered     COVID-19 Bivalent 12+ (Pfizer) 10/14/2022     COVID-19 MONOVALENT 12+ (Pfizer) 05/18/2021, 06/08/2021, 01/19/2022     DTAP (<7y) 03/01/1985, 06/01/1985, 07/01/1985, 07/01/1986, 07/26/1990     Flu 65+ Years 12/09/2008, 12/06/2012     HPV Quadrivalent 12/12/2008, 09/06/2011     Hepatitis B, Peds 02/16/1998     Historical DTP/aP 03/01/1985, 06/01/1985, 07/01/1985, 07/01/1986, 07/26/1990     Historical Hepb 02/16/1998     Influenza (IIV3) PF 12/09/2008, 12/06/2012     Influenza Vaccine >6 months,quad, PF 11/21/2017, 01/03/2019, 09/17/2019, 09/16/2020,  10/14/2022     MMR 02/27/1986, 07/17/1997     Poliovirus, inactivated (IPV) 06/01/1985, 07/01/1985, 01/01/1987, 07/26/1990     TD,PF 7+ (Tenivac) 07/06/2023     TDAP Vaccine (Adacel) 06/30/1997, 03/31/2008, 05/14/2010, 12/06/2012     Td (Adult), Adsorbed 06/30/1997     Allergies   Allergen Reactions     Dust Mites Shortness Of Breath     Animal Dander      Other reaction(s): *Unknown     Mold      Other reaction(s): Runny Nose     Trees      OBJECTIVE:                                                                 /83 (BP Location: Left arm, Patient Position: Sitting, Cuff Size: Adult Regular)   Pulse 75   Temp 98.3  F (36.8  C) (Tympanic)   Wt 104.2 kg (229 lb 12.8 oz)   LMP 12/25/2023 (Approximate)   SpO2 96%   BMI 40.71 kg/m          Physical Exam  Vitals and nursing note reviewed.   Constitutional:       Appearance: She is not diaphoretic.   HENT:      Head: Normocephalic and atraumatic.      Right Ear: Tympanic membrane, ear canal and external ear normal.      Left Ear: Tympanic membrane, ear canal and external ear normal.      Nose: Septal deviation (mild, to the right) present. No mucosal edema or rhinorrhea.      Right Turbinates: Enlarged (mildly).      Left Turbinates: Enlarged (mildly).      Mouth/Throat:      Mouth: Mucous membranes are moist.      Pharynx: Oropharynx is clear. No oropharyngeal exudate.   Eyes:      General:         Right eye: No discharge.         Left eye: No discharge.      Conjunctiva/sclera:      Right eye: Right conjunctiva is not injected.      Left eye: Left conjunctiva is not injected.   Cardiovascular:      Rate and Rhythm: Normal rate and regular rhythm.      Heart sounds: Normal heart sounds. No murmur heard.  Pulmonary:      Effort: Pulmonary effort is normal. No respiratory distress.      Breath sounds: Normal breath sounds and air entry. No decreased breath sounds, wheezing, rhonchi or rales.   Abdominal:      Hernia: A hernia (Large, umbilical, easily  reducible) is present.   Musculoskeletal:         General: Normal range of motion.      Cervical back: Normal range of motion.   Skin:     General: Skin is warm.   Neurological:      Mental Status: She is oriented to person, place, and time.   Psychiatric:         Mood and Affect: Affect is flat.         Behavior: Behavior normal.      Comments: Pressured speech.  Poor eye contact           WORKUP:     ACT 22    ASSESSMENT/PLAN:    Shortness of breath  Improved with weight loss and consistent use of Symbicort 80/4.5 mcg 2 puffs once daily.  She rarely needs to use albuterol inhaler.  - Continue as is.  Dependence in the control, the patient may increase Symbicort to 2 puffs twice daily.    - budesonide-formoterol (SYMBICORT) 80-4.5 MCG/ACT Inhaler  Dispense: 10.2 g; Refill: 4    Seasonal allergic rhinitis due to pollen  Allergic rhinitis due to animals  Allergic rhinitis caused by mold  Allergic rhinitis due to dust mite  Allergic conjunctivitis of both eyes    These days, fairly well-controlled with intranasal fluticasone and Optivar eyedrops.  - Continue intranasal fluticasone 1-2 sprays in each nostril once daily.  - Continue Optivar 1 drop in each eye twice daily as needed.  - Add azelastine 2 sprays in each nostril twice daily as needed.  She may needed in the Spring, Summer, and Fall.  In the past, she did not like the taste, but she is willing to try it again.  If she has issues again, I will send a prescription for ipratropium bromide.  At this point, the patient is not interested in allergen immunotherapy unless it helps with her recent rashes.    - fluticasone (FLONASE) 50 MCG/ACT nasal spray  Dispense: 15.8 mL; Refill: 11  - azelastine (ASTELIN) 0.1 % nasal spray  Dispense: 30 mL; Refill: 3  - azelastine (OPTIVAR) 0.05 % ophthalmic solution  Dispense: 6 mL; Refill: 11    Rash   Currently she is asymptomatic.  If she develops the rash, I recommend to take pictures, and try to set up an  appointment.  Considering history of bruising associated with the rash, I would have some concerns about vasculitis as well.      Follow-up in 5 to 6 months or sooner if needed.    Thank you for allowing us to participate in the care of this patient. Please feel free to contact us if there are any questions or concerns about the patient.    Disclaimer: This note consists of symbols derived from keyboarding, dictation and/or voice recognition software. As a result, there may be errors in the script that have gone undetected. Please consider this when interpreting information found in this chart.    Rudy Shin MD, FAAAAI, FACAAI  Allergy, Asthma and Immunology     [facility]        Again, thank you for allowing me to participate in the care of your patient.        Sincerely,        Rudy Shin MD

## 2024-01-26 NOTE — PROGRESS NOTES
SUBJECTIVE:                                                                   Maddy Ortiz presents today to our Allergy Clinic at [facility] for a follow up visit. She is a 38 year old female with allergic rhinitis and episodes of shortness of breath on exertion.     Percutaneous skin puncture testing for aeroallergens performed in November 2016 showed sensitivity to dog, cat, dust mite, molds, pollen of trees, grass, and weeds. In spring-summer 2018, she had a buildup using cluster schedule for allergen immunotherapy.  She reached maintenance dose in July 2018.  One episode of anaphylaxis on September 8, 2020, due to allergen immunotherapy, Red 1:1, 0.5mL   Was given epi x 2.  She tolerated a lower dose x 1 after that. She stopped taking hydroxyzine.  Take cetirizine 10 mg by mouth once daily.  Sometimes, when symptoms are worse, she may take 20 mg by mouth.  She stopped allergen immunotherapy after September 22, 2020.  She has a history of dyspnea on exertion. She was asked several times if albuterol inhaler was helpful or not, and she was not sure. In November, she had PFT, which showed a mild obstructive/restrictive pattern. There was a questionable postbronchodilator partial reversibility.    Maddy uses Symbicort 2 puffs once daily. She denies having shortness of breath, chest tightness or wheezing.  Has better sleep. She doesn't need to use albuterol. Walking is not an issue anymore. She lost some weight and she found it helpful. She has hernia which she waits to be operated.     She has been using intranasal fluticasone 2 sprays in each nostril once daily, she remember, 2-5 times a week.   She stopped all oral antihistamines. She uses eye drops for red eyes. She thinks its Optivar, and the med is helpful.   She has no issues with nasal congestion, rhinorrhea or excessive sneezing. She doesn't remember if she had issues in Fall.        She is thinking about allergen immunotherapy  again; however, she is not sure whether she wants her started considering her previous allergic reaction due to immunotherapy requiring EpiPen x2.     She started developing rashes on her lower extremities and arms, several times. Twice rash turned into a bruise. Rash persisted on the same spot for several days at least. Doesn't have any rash today.   Today, the patient is not interested in allergen immunotherapy unless it helps with the rash.  So far,she is satisfied with rhinoconjunctivitis symptom control.      Patient Active Problem List   Diagnosis    Animal dander allergy    CARDIOVASCULAR SCREENING; LDL GOAL LESS THAN 160    Psychosis (H)    Paranoid type delusional disorder (H)    Bipolar 1 disorder (H)    Insomnia    Depression with anxiety    Seasonal allergic rhinitis due to pollen    Allergic rhinitis due to mold    Allergic rhinitis due to animal dander    Allergic rhinitis due to dust mite    Encounter for IUD insertion    Schizoaffective disorder, bipolar type (H)    Gastroesophageal reflux disease without esophagitis    Paranoia (psychosis) (H)    Morbid obesity (H)    Schizoaffective disorder (H)    Umbilical hernia without obstruction and without gangrene    Fatty liver    Type 2 diabetes mellitus without complication, without long-term current use of insulin (H)    Elevated LFTs    Disorganized behavior    Infection due to 2019 novel coronavirus    Polypharmacy    Sedated due to multiple medications    Overdose, undetermined intent, initial encounter    Segmental and somatic dysfunction    SI (sacroiliac) joint dysfunction    mirena IUD 9/30/22    Neck pain    Hip pain, right    Chronic pain of both knees    Difficulty communicating    Urinary incontinence, unspecified type       Past Medical History:   Diagnosis Date    Bipolar 1 disorder (H) 12/6/2012    Depressive disorder     Diabetes mellitus, type 2 (H) 12/13/2021    Paranoid type delusional disorder (H) 11/14/2012      *Patient is Adopted            Problem (# of Occurrences) Relation (Name,Age of Onset)    Unknown/Adopted (3) Mother, Father, Other    Hypertension (1) Sister          Past Surgical History:   Procedure Laterality Date    TONSILLECTOMY & ADENOIDECTOMY      as a child    TONSILLECTOMY & ADENOIDECTOMY       Social History     Socioeconomic History    Marital status: Single     Spouse name: None    Number of children: None    Years of education: None    Highest education level: None   Occupational History     Employer: CURRENTLY UNEMPLOYED AT THIS TIME   Tobacco Use    Smoking status: Former     Packs/day: .5     Types: Cigarettes    Smokeless tobacco: Former    Tobacco comments:     around 2nd hand smoke   Vaping Use    Vaping Use: Never used   Substance and Sexual Activity    Alcohol use: Not Currently    Drug use: Not Currently    Sexual activity: Not Currently     Partners: Male     Birth control/protection: Abstinence, None   Other Topics Concern    Parent/sibling w/ CABG, MI or angioplasty before 65F 55M? No   Social History Narrative    One child    Education: some college        01/26/24            ENVIRONMENTAL HISTORY: The [place] The home is heated with a electric furnace. They do not have central air conditioning, does have box air conditioner in the wall and has air purifier. The patient's bedroom is furnished with stuffed animals in bed, carpeting in bedroom and fabric window coverings. Pets inside the apartment includes 1 cat. There is history of cockroach or mice infestation. There are no smokers in the house.  The apartment does not have a basement.          Social Determinants of Health     Financial Resource Strain: Low Risk  (1/3/2024)    Financial Resource Strain     Within the past 12 months, have you or your family members you live with been unable to get utilities (heat, electricity) when it was really needed?: No   Food Insecurity: High Risk (1/3/2024)    Food  Insecurity     Within the past 12 months, did you worry that your food would run out before you got money to buy more?: Yes     Within the past 12 months, did the food you bought just not last and you didn t have money to get more?: No   Transportation Needs: High Risk (1/3/2024)    Transportation Needs     Within the past 12 months, has lack of transportation kept you from medical appointments, getting your medicines, non-medical meetings or appointments, work, or from getting things that you need?: Yes   Housing Stability: High Risk (1/3/2024)    Housing Stability     Do you have housing? : Yes     Are you worried about losing your housing?: Yes           Review of Systems   HENT:  Negative for congestion, nosebleeds, postnasal drip, rhinorrhea, sinus pressure, sneezing and sore throat.    Eyes:  Negative for discharge, redness and itching.   Respiratory:  Negative for cough, chest tightness, shortness of breath and wheezing.            Current Outpatient Medications:     ACCU-CHEK GUIDE test strip, USE TO TEST BLOOD SUGAR 6 TIMES DAILY OR AS DIRECTED, Disp: 600 strip, Rfl: 3    azelastine (ASTELIN) 0.1 % nasal spray, Spray 2 sprays into both nostrils 2 times daily as needed for rhinitis, Disp: 30 mL, Rfl: 3    azelastine (OPTIVAR) 0.05 % ophthalmic solution, Apply 1 drop to eye 2 times daily as needed (itchy/watery/red eyes), Disp: 6 mL, Rfl: 11    blood glucose (ONETOUCH VERIO IQ) test strip, Use to test blood sugar 2 times daily or as directed., Disp: 100 strip, Rfl: 11    Blood Glucose Monitoring Suppl (ONETOUCH VERIO FLEX SYSTEM) w/Device KIT, 1 each as needed, Disp: 1 kit, Rfl: 0    budesonide-formoterol (SYMBICORT) 80-4.5 MCG/ACT Inhaler, Inhale 2 puffs into the lungs 2 times daily, Disp: 10.2 g, Rfl: 4    fluticasone (FLONASE) 50 MCG/ACT nasal spray, Spray 2 sprays into both nostrils daily, Disp: 15.8 mL, Rfl: 11    gabapentin (NEURONTIN) 300 MG capsule, Take 2 capsules (600 mg) by mouth At Bedtime, Disp:  60 capsule, Rfl: 5    insulin pen needle (BD PEN NEEDLE ROZ 2ND GEN) 32G X 4 MM miscellaneous, USE 1 PEN NEEDLES DAILY WITH VICTOZA, Disp: 100 each, Rfl: 3    ketoconazole (NIZORAL) 2 % external cream, Apply topically 2 times daily X14 days, Disp: , Rfl:     lamoTRIgine (LAMICTAL) 200 MG tablet, Take 200 mg by mouth at bedtime Also 25mg in the AM and 25mg at llunch time., Disp: , Rfl:     levonorgestrel (MIRENA) 20 MCG/DAY IUD, 1 each (20 mcg) by Intrauterine route once, Disp: , Rfl:     levothyroxine (SYNTHROID/LEVOTHROID) 50 MCG tablet, TAKE 1 TABLET (50 MCG) BY MOUTH DAILY BEFORE BREAKFAST, Disp: 90 tablet, Rfl: 3    liraglutide (VICTOZA PEN) 18 MG/3ML solution, INJECT 1.8 MG UNDER THE SKIN ONCE DAILY, Disp: 9 mL, Rfl: 3    lithium ER (LITHOBID) 300 MG CR tablet, Take 300 mg by mouth daily, Disp: , Rfl:     Melatonin 10 MG TABS tablet, Take 10 mg by mouth at bedtime, Disp: , Rfl:     naltrexone (DEPADE/REVIA) 50 MG tablet, Take 0.5 tablets by mouth daily at 2 pm, Disp: , Rfl:     OneTouch Delica Lancets 33G MISC, 1 each 2 times daily, Disp: 100 each, Rfl: 11    propranolol (INDERAL) 10 MG tablet, Take 10 mg by mouth 3 times daily as needed, Disp: , Rfl:     risperiDONE microspheres ER (RISPERDAL CONSTA) 50 MG injection, Next due 6/30, Disp: , Rfl:     spacer (OPTICHAMBER APOLINAR) holding chamber, Use with Symbicort and albuterol inhalers as prescribed, Disp: 1 each, Rfl: 0    triamcinolone (KENALOG) 0.1 % external ointment, APPLY TOPICALLY 2 TIMES DAILY FOR UP TO 2 WEEKS, Disp: 30 g, Rfl: 1    venlafaxine (EFFEXOR XR) 75 MG 24 hr capsule, 150 mg daily, Disp: , Rfl:     vitamin D3 (CHOLECALCIFEROL) 50 mcg (2000 units) tablet, Take 1 tablet (50 mcg) by mouth daily, Disp: 90 tablet, Rfl: 3    acetaminophen (TYLENOL) 325 MG tablet, Take 2 tablets (650 mg) by mouth every 4 hours as needed for mild pain (to moderate pain) (Patient not taking: Reported on 1/26/2024), Disp: , Rfl:     albuterol (PROAIR HFA/PROVENTIL  HFA/VENTOLIN HFA) 108 (90 Base) MCG/ACT inhaler, Inhale 2 puffs into the lungs every 4 hours as needed for wheezing or shortness of breath (Patient not taking: Reported on 1/26/2024), Disp: 18 g, Rfl: 1    ammonium lactate (LAC-HYDRIN) 12 % external cream, Apply topically 2 times daily as needed for dry skin (Patient not taking: Reported on 1/26/2024), Disp: 385 g, Rfl: 3    ibuprofen (ADVIL/MOTRIN) 200 MG tablet, Take 200 mg by mouth every 4 hours as needed for pain (Patient not taking: Reported on 1/26/2024), Disp: , Rfl:     lamoTRIgine (LAMICTAL) 25 MG tablet, TAKE 1 TABLET BY MOUTH TWICE A DAY IN THE MORNING AND AFTERNOON. (Patient not taking: Reported on 7/19/2023), Disp: , Rfl:     levocetirizine (XYZAL) 5 MG tablet, Take 1 tablet (5 mg) by mouth every evening (Patient not taking: Reported on 1/3/2024), Disp: 90 tablet, Rfl: 1    risperiDONE (RISPERDAL) 1 MG tablet, Take 1 mg by mouth at bedtime (Patient not taking: Reported on 1/26/2024), Disp: , Rfl:   Immunization History   Administered Date(s) Administered    COVID-19 Bivalent 12+ (Pfizer) 10/14/2022    COVID-19 MONOVALENT 12+ (Pfizer) 05/18/2021, 06/08/2021, 01/19/2022    DTAP (<7y) 03/01/1985, 06/01/1985, 07/01/1985, 07/01/1986, 07/26/1990    Flu 65+ Years 12/09/2008, 12/06/2012    HPV Quadrivalent 12/12/2008, 09/06/2011    Hepatitis B, Peds 02/16/1998    Historical DTP/aP 03/01/1985, 06/01/1985, 07/01/1985, 07/01/1986, 07/26/1990    Historical Hepb 02/16/1998    Influenza (IIV3) PF 12/09/2008, 12/06/2012    Influenza Vaccine >6 months,quad, PF 11/21/2017, 01/03/2019, 09/17/2019, 09/16/2020, 10/14/2022    MMR 02/27/1986, 07/17/1997    Poliovirus, inactivated (IPV) 06/01/1985, 07/01/1985, 01/01/1987, 07/26/1990    TD,PF 7+ (Tenivac) 07/06/2023    TDAP Vaccine (Adacel) 06/30/1997, 03/31/2008, 05/14/2010, 12/06/2012    Td (Adult), Adsorbed 06/30/1997     Allergies   Allergen Reactions    Dust Mites Shortness Of Breath    Animal Dander      Other  reaction(s): *Unknown    Mold      Other reaction(s): Runny Nose    Trees      OBJECTIVE:                                                                 /83 (BP Location: Left arm, Patient Position: Sitting, Cuff Size: Adult Regular)   Pulse 75   Temp 98.3  F (36.8  C) (Tympanic)   Wt 104.2 kg (229 lb 12.8 oz)   LMP 12/25/2023 (Approximate)   SpO2 96%   BMI 40.71 kg/m          Physical Exam  Vitals and nursing note reviewed.   Constitutional:       Appearance: She is not diaphoretic.   HENT:      Head: Normocephalic and atraumatic.      Right Ear: Tympanic membrane, ear canal and external ear normal.      Left Ear: Tympanic membrane, ear canal and external ear normal.      Nose: Septal deviation (mild, to the right) present. No mucosal edema or rhinorrhea.      Right Turbinates: Enlarged (mildly).      Left Turbinates: Enlarged (mildly).      Mouth/Throat:      Mouth: Mucous membranes are moist.      Pharynx: Oropharynx is clear. No oropharyngeal exudate.   Eyes:      General:         Right eye: No discharge.         Left eye: No discharge.      Conjunctiva/sclera:      Right eye: Right conjunctiva is not injected.      Left eye: Left conjunctiva is not injected.   Cardiovascular:      Rate and Rhythm: Normal rate and regular rhythm.      Heart sounds: Normal heart sounds. No murmur heard.  Pulmonary:      Effort: Pulmonary effort is normal. No respiratory distress.      Breath sounds: Normal breath sounds and air entry. No decreased breath sounds, wheezing, rhonchi or rales.   Abdominal:      Hernia: A hernia (Large, umbilical, easily reducible) is present.   Musculoskeletal:         General: Normal range of motion.      Cervical back: Normal range of motion.   Skin:     General: Skin is warm.   Neurological:      Mental Status: She is oriented to person, place, and time.   Psychiatric:         Mood and Affect: Affect is flat.         Behavior: Behavior normal.      Comments: Pressured speech.  Poor  eye contact           WORKUP:     ACT 22    ASSESSMENT/PLAN:    Shortness of breath  Improved with weight loss and consistent use of Symbicort 80/4.5 mcg 2 puffs once daily.  She rarely needs to use albuterol inhaler.  - Continue as is.  Dependence in the control, the patient may increase Symbicort to 2 puffs twice daily.    - budesonide-formoterol (SYMBICORT) 80-4.5 MCG/ACT Inhaler  Dispense: 10.2 g; Refill: 4    Seasonal allergic rhinitis due to pollen  Allergic rhinitis due to animals  Allergic rhinitis caused by mold  Allergic rhinitis due to dust mite  Allergic conjunctivitis of both eyes    These days, fairly well-controlled with intranasal fluticasone and Optivar eyedrops.  - Continue intranasal fluticasone 1-2 sprays in each nostril once daily.  - Continue Optivar 1 drop in each eye twice daily as needed.  - Add azelastine 2 sprays in each nostril twice daily as needed.  She may needed in the Spring, Summer, and Fall.  In the past, she did not like the taste, but she is willing to try it again.  If she has issues again, I will send a prescription for ipratropium bromide.  At this point, the patient is not interested in allergen immunotherapy unless it helps with her recent rashes.    - fluticasone (FLONASE) 50 MCG/ACT nasal spray  Dispense: 15.8 mL; Refill: 11  - azelastine (ASTELIN) 0.1 % nasal spray  Dispense: 30 mL; Refill: 3  - azelastine (OPTIVAR) 0.05 % ophthalmic solution  Dispense: 6 mL; Refill: 11    Rash   Currently she is asymptomatic.  If she develops the rash, I recommend to take pictures, and try to set up an appointment.  Considering history of bruising associated with the rash, I would have some concerns about vasculitis as well.      Follow-up in 5 to 6 months or sooner if needed.    Thank you for allowing us to participate in the care of this patient. Please feel free to contact us if there are any questions or concerns about the patient.    Disclaimer: This note consists of symbols derived  from keyboarding, dictation and/or voice recognition software. As a result, there may be errors in the script that have gone undetected. Please consider this when interpreting information found in this chart.    Rudy Shin MD, FAAAAI, FACAAI  Allergy, Asthma and Immunology     [facility]

## 2024-01-27 ENCOUNTER — MYC MEDICAL ADVICE (OUTPATIENT)
Dept: ALLERGY | Facility: CLINIC | Age: 39
End: 2024-01-27
Payer: COMMERCIAL

## 2024-01-27 DIAGNOSIS — R21 RASH: ICD-10-CM

## 2024-01-27 DIAGNOSIS — J30.89 ALLERGIC RHINITIS DUE TO DUST MITE: Primary | ICD-10-CM

## 2024-01-29 ENCOUNTER — TRANSFERRED RECORDS (OUTPATIENT)
Dept: HEALTH INFORMATION MANAGEMENT | Facility: CLINIC | Age: 39
End: 2024-01-29

## 2024-01-29 LAB — PHQ9 SCORE: 9

## 2024-01-30 NOTE — TELEPHONE ENCOUNTER
I need more pictures.  I would use a different distance as well.  At this point, it is far hard for me to understand what my looking at, especially at the area that was marked without the bruise.    Rudy Shin MD     Raj with [facility] calls to report patient has been feeling dizzy for approximately 1 month and patient believes it may be the Pravastatin.  She would like to know if this medication can be changed. Please call Raj back at [phone]

## 2024-02-11 PROBLEM — R32 URINARY INCONTINENCE, UNSPECIFIED TYPE: Status: RESOLVED | Noted: 2023-11-28 | Resolved: 2024-02-11

## 2024-02-12 NOTE — PROGRESS NOTES
"    DISCHARGE  Reason for Discharge: Patient chooses to discontinue therapy.  Patient has failed to schedule further appointments.    Equipment Issued: none    Discharge Plan: Patient to continue home program.    Referring Provider:  Shirley Freeman     01/09/24 0500   Appointment Info   Signing clinician's name / credentials Renee Sutton, PT MA #[id number]   Total/Authorized Visits 8 ([facility])   Visits Used 2   Medical Diagnosis Urinary incontinence, unspecified type   PT Tx Diagnosis Pelvic Floor Muscle Dysfunction   Quick Adds Certification;Pelvic Consent   Progress Note/Certification   Start of Care Date 11/28/23   Onset of illness/injury or Date of Surgery 09/20/23   Therapy Frequency 1x per week, weaning to every other week   Predicted Duration 12 weeks   Certification date from 11/28/23   Certification date to 02/20/24   Progress Note Due Date 02/20/24   PT Goal 1   Goal Identifier STG   Goal Description 1)Pt will report 2/7 days without leaking, in 4 weeks.   Goal Progress Not Met: pt states she cannot discern what is vaginal discharge and what is urine leaking, but has \"something wet\" everyday.  (cont x 3 weeks)   Target Date 01/30/24   PT Goal 2   Goal Identifier STG   Goal Description 2)Pt will report making it to bathroom without leaking in 50% of her urges, in 4 weeks.   Goal Progress Met: pt states \"I don't feel like it is leaking when I am rushing to the bathroom.\"   Target Date 12/26/23   Date Met 01/09/24   PT Goal 3   Goal Identifier LTG   Goal Description 3)Pt will report 4/7 days without leaking in 8 weeks.   Target Date 01/23/24   PT Goal 4   Goal Identifier LTG   Goal Description 4)Pt will report void times of every 2 hours consistently, in 8 weeks.   Target Date 01/23/24   PT Goal 5   Goal Identifier LTG   Goal Description 5)Pt will be indep in HEP to prevent return of symptoms, in 8 weeks.   Target Date 01/23/24   Subjective Report   Subjective Report States she feels she is going to " "the bathroom more often.  Was on an allergy med - and is not anymore, but feels this has helped her inflammation and LBP.  Still feels biggest problem is UI - not sure when or how this happens.  Is not feeling when it leaks out. Has an IUD but is not sexually active.   Objective Measure 1   Objective Measure Fluid Intake   Details Water = 5-6 glasses/day, Diet Dr Pepper = 12-24oz/day mostly in ams, Sunkist Pop = 12oz/day in evening.  Feels she is overall trying to drink more plain water lately.   Objective Measure 2   Objective Measure Pad Use   Details Changes in am prior to shower and states pad is wet.  Feels her underwear move around a lot and this bunches the pad and will cause leaking into underwear if pad moves.  Only changes 1x per day.  Finds only one \"spot\" of dampness on the pad usually.   Objective Measure 3   Objective Measure Void Times   Details Feels urges less often with larger volumes.  Goes about every 3 hours, with leaking in between.   Objective Measure 4   Objective Measure Vaginal Discharge vs Urine Leak   Details Pink/red vaginal discharge to clear - states no routine or reason for discolored discharge.   Objective Measure 5   Objective Measure BMs   Details Stopped taking allergy meds and felt she got constipated for a short time. Not having to take laxatives.  Having 1-2 BMs per day. Typically is Type 5 to type 3.  Will get occasional strong urges.   Therapeutic Procedure/Exercise   Therapeutic Procedures: strength, endurance, ROM, flexibillity minutes (69738) 15   Ther Proc 1 - Details Re-instructed in PFM contractions.  Discussed what is \"do-able\" for pt as she states \"I need a strict routine to follow.\" Reminded pt she has her exers on her phone and updated program to include 10 times quick and 10 times hold pelvic floor muscle contractions.  Discussed need for routine activity, ie: walking in halls of home, outside as weather permits, and re-instructed in steps to control the urge. " "  Skilled Intervention Exer: strengthening, functional use, setting a strict routine for exer;  Progression of HEP.   Patient Response/Progress Pt states will do exers as long as \"they are assigned to me.\"   Self Care/home Management   Self Care 1 - Details Pt telling PT of long time IUD use, and pt is asking PT why this is still needed after 20 yrs or what would happen if she decided not to use any longer.  Pt also asking if tampons would help with leaking or if vaginal cup would be advised for her vaginal discharge.  She states that her discharge is assumed to be from constant IUD use.  PT directed pt to speak/make an appt with her OB/GYN for most questions.  Did use 3D model to explain how tampon use could act as a physical \"closing piece\" to her urethra by allowing anterior vaginal wall pressure to push on the urethra - but told this is better achieved by an Impressa Device that is marketed for this purpose (women to use during physical workouts).  Talked about fiber intake and told pt that her recommended amount is 25-35gms per day.  Taught how she could investigate and chart her fiber intake to see affects on her BMs.  Discussed and suggested pt try timed voids of every 2 hours.  Then taught to increase time to 2.5 hours as long as not leaking in between, as a means to get bladder on a reliable schedule.   ADL/Home Mgmt Training (29525) 32   Skilled Intervention Self-care: suggested routines, tips and suggestions for gaining continence.   Patient Response/Progress Pt states will make an appt with OB/GYN and will try charting fiber intake and doing timed voids.   Education   Learner/Method Patient;Listening;Demonstration;Pictures/Video  (mildly limited by video visit today.)   Plan   Home program [id number]   Updates to plan of care Updated PTRx to include 10 reps of quicks and holds 2x per day.  REminded to be doing steps to control the urge.   Plan for next session Pt states she will contact PT after gets an " appt and is seen by OB/GYN.   Total Session Time   Timed Code Treatment Minutes 47   Total Treatment Time (sum of timed and untimed services) 47   Thank you for the referral of this patient.  Renee Sutton, PT, MA  #[id number]

## 2024-02-14 ENCOUNTER — TRANSFERRED RECORDS (OUTPATIENT)
Dept: HEALTH INFORMATION MANAGEMENT | Facility: CLINIC | Age: 39
End: 2024-02-14
Payer: COMMERCIAL

## 2024-02-14 LAB — PHQ9 SCORE: 8

## 2024-02-14 RX ORDER — HYDROCORTISONE 25 MG/G
OINTMENT TOPICAL
Qty: 30 G | Refills: 1 | Status: SHIPPED | OUTPATIENT
Start: 2024-02-14 | End: 2024-08-02

## 2024-02-14 NOTE — TELEPHONE ENCOUNTER
I prescribed you prescription strength hydrocortisone ointment.  If it does not work, consider seeing Dermatology.    Rudy Shin MD

## 2024-02-16 ENCOUNTER — OFFICE VISIT (OUTPATIENT)
Dept: OBGYN | Facility: CLINIC | Age: 39
End: 2024-02-16
Payer: COMMERCIAL

## 2024-02-16 VITALS
WEIGHT: 232 LBS | DIASTOLIC BLOOD PRESSURE: 76 MMHG | RESPIRATION RATE: 18 BRPM | TEMPERATURE: 98.5 F | BODY MASS INDEX: 41.11 KG/M2 | SYSTOLIC BLOOD PRESSURE: 115 MMHG | HEART RATE: 91 BPM | HEIGHT: 63 IN

## 2024-02-16 DIAGNOSIS — N76.0 BV (BACTERIAL VAGINOSIS): ICD-10-CM

## 2024-02-16 DIAGNOSIS — N89.8 VAGINAL DISCHARGE: ICD-10-CM

## 2024-02-16 DIAGNOSIS — B96.89 BV (BACTERIAL VAGINOSIS): ICD-10-CM

## 2024-02-16 DIAGNOSIS — A64 STI (SEXUALLY TRANSMITTED INFECTION): ICD-10-CM

## 2024-02-16 DIAGNOSIS — Z30.45 CONTRACEPTIVE PATCH STATUS: ICD-10-CM

## 2024-02-16 DIAGNOSIS — T83.32XS INTRAUTERINE CONTRACEPTIVE DEVICE THREADS LOST, SEQUELA: ICD-10-CM

## 2024-02-16 DIAGNOSIS — Z30.011 ENCOUNTER FOR INITIAL PRESCRIPTION OF CONTRACEPTIVE PILLS: Primary | ICD-10-CM

## 2024-02-16 LAB
C TRACH DNA SPEC QL PROBE+SIG AMP: NEGATIVE
CLUE CELLS: PRESENT
N GONORRHOEA DNA SPEC QL NAA+PROBE: NEGATIVE
TRICHOMONAS, WET PREP: ABNORMAL
WBC'S/HIGH POWER FIELD, WET PREP: ABNORMAL
YEAST, WET PREP: ABNORMAL

## 2024-02-16 PROCEDURE — 87210 SMEAR WET MOUNT SALINE/INK: CPT | Performed by: OBSTETRICS & GYNECOLOGY

## 2024-02-16 PROCEDURE — 58301 REMOVE INTRAUTERINE DEVICE: CPT | Mod: 52 | Performed by: OBSTETRICS & GYNECOLOGY

## 2024-02-16 PROCEDURE — 99215 OFFICE O/P EST HI 40 MIN: CPT | Mod: 25 | Performed by: OBSTETRICS & GYNECOLOGY

## 2024-02-16 PROCEDURE — 87491 CHLMYD TRACH DNA AMP PROBE: CPT | Performed by: OBSTETRICS & GYNECOLOGY

## 2024-02-16 PROCEDURE — 87591 N.GONORRHOEAE DNA AMP PROB: CPT | Performed by: OBSTETRICS & GYNECOLOGY

## 2024-02-16 RX ORDER — NORELGESTROMIN AND ETHINYL ESTRADIOL 35; 150 UG/MG; UG/MG
PATCH TRANSDERMAL
Qty: 9 PATCH | Refills: 3 | Status: SHIPPED | OUTPATIENT
Start: 2024-02-16 | End: 2024-04-09

## 2024-02-16 RX ORDER — LEVONORGESTREL/ETHIN.ESTRADIOL 0.1-0.02MG
1 TABLET ORAL DAILY
Qty: 84 TABLET | Refills: 3 | Status: SHIPPED | OUTPATIENT
Start: 2024-02-16 | End: 2024-02-16

## 2024-02-16 RX ORDER — METRONIDAZOLE 500 MG/1
500 TABLET ORAL 2 TIMES DAILY
Qty: 14 TABLET | Refills: 0 | Status: SHIPPED | OUTPATIENT
Start: 2024-02-16 | End: 2024-02-23

## 2024-02-16 NOTE — NURSING NOTE
"Initial /76 (BP Location: Right arm, Patient Position: Chair, Cuff Size: Adult Large)   Pulse 91   Temp 98.5  F (36.9  C) (Tympanic)   Resp 18   Ht 1.6 m (5' 3\")   Wt 105.2 kg (232 lb)   LMP 12/25/2023 (Approximate)   BMI 41.10 kg/m   Estimated body mass index is 41.1 kg/m  as calculated from the following:    Height as of this encounter: 1.6 m (5' 3\").    Weight as of this encounter: 105.2 kg (232 lb). .    "

## 2024-02-16 NOTE — PROGRESS NOTES
Gynecology Consult Note    HPI: Maddy Ortiz is a 38 year old  presents for IUD removal.  The patient states that she has had irregular bleeding/spotting with IUD.  She states that her menstrual bleeding is a potential of a week of bleeding.  She does not like this unpredictability.  Additionally she feels that it is causing vaginal irritation/discharge and she wears a panty liner frequently due to the discharge and would like to stop.  Interested in OCPs for contraception.  Denies any history of hypertension, migraine with aura, VTE, smoking.    ROS: 10 pt ROS neg other than HPI    PMH:   Past Medical History:   Diagnosis Date    Bipolar 1 disorder (H) 2012    Depressive disorder     Diabetes mellitus, type 2 (H) 2021    Paranoid type delusional disorder (H) 2012       PSHx:   Past Surgical History:   Procedure Laterality Date    TONSILLECTOMY & ADENOIDECTOMY      as a child    TONSILLECTOMY & ADENOIDECTOMY         Medications:   albuterol (PROAIR HFA/PROVENTIL HFA/VENTOLIN HFA) 108 (90 Base) MCG/ACT inhaler, Inhale 2 puffs into the lungs every 4 hours as needed for wheezing or shortness of breath  ammonium lactate (LAC-HYDRIN) 12 % external cream, Apply topically 2 times daily as needed for dry skin  azelastine (ASTELIN) 0.1 % nasal spray, Spray 2 sprays into both nostrils 2 times daily as needed for rhinitis  azelastine (OPTIVAR) 0.05 % ophthalmic solution, Apply 1 drop to eye 2 times daily as needed (itchy/watery/red eyes)  budesonide-formoterol (SYMBICORT) 80-4.5 MCG/ACT Inhaler, Inhale 2 puffs into the lungs 2 times daily  fluticasone (FLONASE) 50 MCG/ACT nasal spray, Spray 2 sprays into both nostrils daily  hydrocortisone 2.5 % ointment, Apply sparingly to affected area two times daily as needed but not more than 14 days in a row. Spare face, armpits, neck, and groin.  ibuprofen (ADVIL/MOTRIN) 200 MG tablet, Take 200 mg by mouth every 4 hours as needed for pain  ketoconazole (NIZORAL)  2 % external cream, Apply topically 2 times daily X14 days  lamoTRIgine (LAMICTAL) 200 MG tablet, Take 200 mg by mouth at bedtime Also 25mg in the AM and 25mg at llunch time.  levonorgestrel (MIRENA) 20 MCG/DAY IUD, 1 each (20 mcg) by Intrauterine route once  levothyroxine (SYNTHROID/LEVOTHROID) 50 MCG tablet, TAKE 1 TABLET (50 MCG) BY MOUTH DAILY BEFORE BREAKFAST  liraglutide (VICTOZA PEN) 18 MG/3ML solution, INJECT 1.8 MG UNDER THE SKIN ONCE DAILY  lithium ER (LITHOBID) 300 MG CR tablet, Take 300 mg by mouth daily  Melatonin 10 MG TABS tablet, Take 10 mg by mouth at bedtime  naltrexone (DEPADE/REVIA) 50 MG tablet, Take 0.5 tablets by mouth daily at 2 pm  propranolol (INDERAL) 10 MG tablet, Take 10 mg by mouth 3 times daily as needed  risperiDONE microspheres ER (RISPERDAL CONSTA) 50 MG injection, Next due 6/30  spacer ([facility] APOLINAR) holding chamber, Use with Symbicort and albuterol inhalers as prescribed  triamcinolone (KENALOG) 0.1 % external ointment, APPLY TOPICALLY 2 TIMES DAILY FOR UP TO 2 WEEKS  venlafaxine (EFFEXOR XR) 75 MG 24 hr capsule, 150 mg daily  vitamin D3 (CHOLECALCIFEROL) 50 mcg (2000 units) tablet, Take 1 tablet (50 mcg) by mouth daily  ACCU-CHEK GUIDE test strip, USE TO TEST BLOOD SUGAR 6 TIMES DAILY OR AS DIRECTED (Patient not taking: Reported on 2/16/2024)  acetaminophen (TYLENOL) 325 MG tablet, Take 2 tablets (650 mg) by mouth every 4 hours as needed for mild pain (to moderate pain) (Patient not taking: Reported on 1/26/2024)  blood glucose (ONETOUCH VERIO IQ) test strip, Use to test blood sugar 2 times daily or as directed. (Patient not taking: Reported on 2/16/2024)  Blood Glucose Monitoring Suppl (ONETOUCH VERIO FLEX SYSTEM) w/Device KIT, 1 each as needed (Patient not taking: Reported on 2/16/2024)  gabapentin (NEURONTIN) 300 MG capsule, Take 2 capsules (600 mg) by mouth At Bedtime (Patient not taking: Reported on 2/16/2024)  insulin pen needle (BD PEN NEEDLE ROZ 2ND GEN) 32G X 4  MM miscellaneous, USE 1 PEN NEEDLES DAILY WITH VICTOZA (Patient not taking: Reported on 2/16/2024)  lamoTRIgine (LAMICTAL) 25 MG tablet, TAKE 1 TABLET BY MOUTH TWICE A DAY IN THE MORNING AND AFTERNOON. (Patient not taking: Reported on 7/19/2023)  levocetirizine (XYZAL) 5 MG tablet, Take 1 tablet (5 mg) by mouth every evening (Patient not taking: Reported on 1/3/2024)  OneTouch Delica Lancets 33G MISC, 1 each 2 times daily (Patient not taking: Reported on 2/16/2024)  risperiDONE (RISPERDAL) 1 MG tablet, Take 1 mg by mouth at bedtime (Patient not taking: Reported on 2/16/2024)    No current facility-administered medications on file prior to visit.       Allergies:      Allergies   Allergen Reactions    Dust Mites Shortness Of Breath    Animal Dander      Other reaction(s): *Unknown    Mold      Other reaction(s): Runny Nose    Trees        Social History:   Social History     Socioeconomic History    Marital status: Single     Spouse name: Not on file    Number of children: Not on file    Years of education: Not on file    Highest education level: Not on file   Occupational History     Employer: CURRENTLY UNEMPLOYED AT THIS TIME   Tobacco Use    Smoking status: Former     Packs/day: .5     Types: Cigarettes    Smokeless tobacco: Former    Tobacco comments:     around 2nd hand smoke   Vaping Use    Vaping Use: Never used   Substance and Sexual Activity    Alcohol use: Not Currently    Drug use: Not Currently    Sexual activity: Not Currently     Partners: Male     Birth control/protection: Abstinence, None, I.U.D.   Other Topics Concern    Parent/sibling w/ CABG, MI or angioplasty before 65F 55M? No   Social History Narrative    One child    Education: some college        01/26/24            ENVIRONMENTAL HISTORY: [place] The home is heated with a electric furnace. They do not have central air conditioning, does have box air conditioner in the wall and has air  "purifier. The patient's bedroom is furnished with stuffed animals in bed, carpeting in bedroom and fabric window coverings. Pets inside the apartment includes 1 cat. There is history of cockroach or mice infestation. There are no smokers in the house.  The apartment does not have a basement.          Social Determinants of Health     Financial Resource Strain: Low Risk  (1/3/2024)    Financial Resource Strain     Within the past 12 months, have you or your family members you live with been unable to get utilities (heat, electricity) when it was really needed?: No   Food Insecurity: High Risk (1/3/2024)    Food Insecurity     Within the past 12 months, did you worry that your food would run out before you got money to buy more?: Yes     Within the past 12 months, did the food you bought just not last and you didn t have money to get more?: No   Transportation Needs: High Risk (1/3/2024)    Transportation Needs     Within the past 12 months, has lack of transportation kept you from medical appointments, getting your medicines, non-medical meetings or appointments, work, or from getting things that you need?: Yes   Physical Activity: Not on file   Stress: Not on file   Social Connections: Not on file   Interpersonal Safety: Not on file   Housing Stability: High Risk (1/3/2024)    Housing Stability     Do you have housing? : Yes     Are you worried about losing your housing?: Yes       Family History:  Family History   Adopted: Yes   Problem Relation Age of Onset    Unknown/Adopted Mother     Unknown/Adopted Father     Unknown/Adopted Other     Hypertension Sister        Physical Exam:   Vitals:    02/16/24 0940   BP: 115/76   BP Location: Right arm   Patient Position: Chair   Cuff Size: Adult Large   Pulse: 91   Resp: 18   Temp: 98.5  F (36.9  C)   TempSrc: Tympanic   Weight: 105.2 kg (232 lb)   Height: 1.6 m (5' 3\")      Gen: lying in bed, NAD  CV: Reg rate, well perfused  Pulm: no increased work of breathing  Abd: " non-tender, non-distended, no masses   Pelvis: normal appearing external genitalia, vaginal mucosa, cervix, bimanual exam with normal size and contour of uterus with no adnexal masses  Extremities: non-tender, no erythema; no edema  Psych: normal mood and affect  Neuro: no focal deficits    IUD Removal Attempt Procedure Note    Maddy Ortiz  1985  [id number]    The patient was counseled on the risks benefits of removal and desired to proceed.  Informed consent signed.  Technique: The patient was placed in the dorsal lithotomy position.  A speculum was placed in the vagina and the cervix visualized.  Unable to see strings.  Tonsil used to attempt to find strings within the cervix and in the lower uterine segment and unable to grasp IUD strings or IUD itself.  Patient had some discomfort during this portion of the procedure and thus procedure abandoned with plan for imaging and likely hysteroscopy.      A&P Maddy Ortiz is a 38 year old  who presents for IUD removal.  Patient has been unhappy with the bleeding pattern with IUD in place and strongly desires removal.  She is interested in OCPs and prefers to have a more predictable pattern of bleeding.  She does also reporting some vaginal symptoms including discharge.  Did offer to collect wet prep and gonorrhea/chlamydia testing for patient and treat to see if this improves her symptoms.  Patient agreeable to the swabs but states regardless she would like to have a removed.  Did review effectiveness of IUD compared with OCPs as well as need to take OCPs daily.  After discussion the patient changed her mind and would like to use patch to allow for less frequent dosing.  Patch prescribed.  However, upon attempted removal of IUD unable to grasp strings despite multiple attempts.  Therefore procedure abandoned with plan for pelvic ultrasound to confirm IUD position and then upon receiving ultrasound results will plan for hysteroscopy/removal of the IUD.   Discussed with patient awaiting removal of the IUD to begin the patches.  She is in agreement.  Discussed use of backup contraception after removal of the IUD and initiation of patches.  All questions answered patient and agreement with plan of care.    Separate from the above procedure I spent 40 minutes reviewing chart, obtaining history, counseling, examining, coordinating care, documenting this encounter.      Kathryn Shepard MD   2/16/2024 1:24 PM

## 2024-02-23 ENCOUNTER — E-VISIT (OUTPATIENT)
Dept: URGENT CARE | Facility: CLINIC | Age: 39
End: 2024-02-23
Payer: COMMERCIAL

## 2024-02-23 ENCOUNTER — MYC MEDICAL ADVICE (OUTPATIENT)
Dept: FAMILY MEDICINE | Facility: CLINIC | Age: 39
End: 2024-02-23
Payer: COMMERCIAL

## 2024-02-23 DIAGNOSIS — R21 RASH: Primary | ICD-10-CM

## 2024-02-23 PROCEDURE — 99207 PR NON-BILLABLE SERV PER CHARTING: CPT | Performed by: NURSE PRACTITIONER

## 2024-02-23 NOTE — PATIENT INSTRUCTIONS
Dear Maddy Ortiz,    We are sorry you are not feeling well. Based on the responses you provided, it is recommended that you be seen in-person in urgent care so we can better evaluate your symptoms. Please click here to find the nearest urgent care location to you.   You will not be charged for this Visit. Thank you for trusting us with your care.    PHILL Hoskins CNP

## 2024-02-26 ENCOUNTER — TRANSFERRED RECORDS (OUTPATIENT)
Dept: HEALTH INFORMATION MANAGEMENT | Facility: CLINIC | Age: 39
End: 2024-02-26
Payer: COMMERCIAL

## 2024-02-28 ENCOUNTER — MYC MEDICAL ADVICE (OUTPATIENT)
Dept: ALLERGY | Facility: CLINIC | Age: 39
End: 2024-02-28
Payer: COMMERCIAL

## 2024-02-29 ENCOUNTER — VIRTUAL VISIT (OUTPATIENT)
Dept: ALLERGY | Facility: CLINIC | Age: 39
End: 2024-02-29
Attending: ALLERGY & IMMUNOLOGY
Payer: COMMERCIAL

## 2024-02-29 DIAGNOSIS — J30.89 ALLERGIC RHINITIS DUE TO MOLD: ICD-10-CM

## 2024-02-29 DIAGNOSIS — R53.83 OTHER FATIGUE: ICD-10-CM

## 2024-02-29 DIAGNOSIS — J30.89 ALLERGIC RHINITIS DUE TO DUST MITE: ICD-10-CM

## 2024-02-29 DIAGNOSIS — J30.81 ALLERGIC RHINITIS DUE TO ANIMAL DANDER: ICD-10-CM

## 2024-02-29 DIAGNOSIS — J30.1 SEASONAL ALLERGIC RHINITIS DUE TO POLLEN: Primary | ICD-10-CM

## 2024-02-29 DIAGNOSIS — J01.90 ACUTE SINUSITIS WITH COEXISTING CONDITION, NEED PROPHYLACTIC TREATMENT: ICD-10-CM

## 2024-02-29 PROCEDURE — 99214 OFFICE O/P EST MOD 30 MIN: CPT | Mod: 95 | Performed by: ALLERGY & IMMUNOLOGY

## 2024-02-29 NOTE — PATIENT INSTRUCTIONS
We discussed how dimenhydrinate can cause excessive drowsiness.  - I recommend to discontinue it.    -Continue intranasal fluticasone (Flonase) 1-2 sprays in each nostril once daily.  -Continue azelastine nasal spray, 2 sprays in each nostril twice daily as needed.    -Regarding migraines, pay attention to if they get worse after you take pseudoephedrine.  That is one of the known adverse reactions.    -Start using NetiPot twice daily while you are sick.  -Start Augmentin sooner rather than when waiting for 10 to 14 days since the onset of symptoms.  Make sure to take plenty of dairy with probiotics while you take the antibiotic.        Prescription Assistance  If you need assistance with your prescriptions (cost, coverage, etc) please contact: [place] [facility] [phone]           If labs have been ordered/completed, please allow 7-14 business days for final interpretation of results to be sent on My Chart, phone or mail. Some lab results can take up to 28 days for results.         Allergy Staff Appt Hours Shot Hours Locations    Physician      Rudy Shin MD         Support Staff      Callie Franco MA     Tuesday:   [place] :  [place]: :         :  [place] [facility]-3     [place]        Tuesday: 7- 4:20        Wednesday: 7-4:20      : 7-4:10        Tuesday: 7-4:10        Thursday: 7-3:10     [facility]       Tues & Wed: 7-:10       Thurs: 12-4:10       Fri:             [facility]  [address] [place], [place] [address]  Appt Line: [phone]        [facility]  [address] [place] [address]  Appt Line: [phone]     Pulmonary Function Scheduling:  [place]: [phone]  [place]: [phone]  [phone]

## 2024-02-29 NOTE — PROGRESS NOTES
SUBJECTIVE:                                                                   Maddy Ortiz presents today to our Allergy Clinic at [facility] for a follow up visit. She is a 38 year old female with ***.  The {[facility]:[id number]} accompanies the patient and helps to provide history.         Patient Active Problem List   Diagnosis    Animal dander allergy    CARDIOVASCULAR SCREENING; LDL GOAL LESS THAN 160    Psychosis (H)    Paranoid type delusional disorder (H)    Bipolar 1 disorder (H)    Insomnia    Depression with anxiety    Seasonal allergic rhinitis due to pollen    Allergic rhinitis due to mold    Allergic rhinitis due to animal dander    Allergic rhinitis due to dust mite    Encounter for IUD insertion    Schizoaffective disorder, bipolar type (H)    Gastroesophageal reflux disease without esophagitis    Paranoia (psychosis) (H)    Morbid obesity (H)    Schizoaffective disorder (H)    Umbilical hernia without obstruction and without gangrene    Fatty liver    Type 2 diabetes mellitus without complication, without long-term current use of insulin (H)    Elevated LFTs    Disorganized behavior    Infection due to 2019 novel coronavirus    Polypharmacy    Sedated due to multiple medications    Overdose, undetermined intent, initial encounter    Segmental and somatic dysfunction    SI (sacroiliac) joint dysfunction    mirena IUD 9/30/22    Neck pain    Hip pain, right    Chronic pain of both knees    Difficulty communicating       Past Medical History:   Diagnosis Date    Bipolar 1 disorder (H) 12/6/2012    Depressive disorder     Diabetes mellitus, type 2 (H) 12/13/2021    Paranoid type delusional disorder (H) 11/14/2012      *Patient is Adopted           Problem (# of Occurrences) Relation (Name,Age of Onset)    Unknown/Adopted (3) Mother, Father, Other    Hypertension (1) Sister          Past Surgical History:   Procedure Laterality Date    TONSILLECTOMY & ADENOIDECTOMY      as a  child    TONSILLECTOMY & ADENOIDECTOMY       Social History     Socioeconomic History    Marital status: Single   Occupational History     Employer: CURRENTLY UNEMPLOYED AT THIS TIME   Tobacco Use    Smoking status: Former     Packs/day: .5     Types: Cigarettes    Smokeless tobacco: Former    Tobacco comments:     around 2nd hand smoke   Vaping Use    Vaping Use: Never used   Substance and Sexual Activity    Alcohol use: Not Currently    Drug use: Not Currently    Sexual activity: Not Currently     Partners: Male     Birth control/protection: Abstinence, None, I.U.D.   Other Topics Concern    Parent/sibling w/ CABG, MI or angioplasty before 65F 55M? No   Social History Narrative    One child    Education: some college        01/26/24            ENVIRONMENTAL HISTORY: [place] The home is heated with a electric furnace. They do not have central air conditioning, does have box air conditioner in the wall and has air purifier. The patient's bedroom is furnished with stuffed animals in bed, carpeting in bedroom and fabric window coverings. Pets inside the apartment includes 1 cat. There is history of cockroach or mice infestation. There are no smokers in the house.  The apartment does not have a basement.          Social Determinants of Health     Financial Resource Strain: Low Risk  (1/3/2024)    Financial Resource Strain     Within the past 12 months, have you or your family members you live with been unable to get utilities (heat, electricity) when it was really needed?: No   Food Insecurity: High Risk (1/3/2024)    Food Insecurity     Within the past 12 months, did you worry that your food would run out before you got money to buy more?: Yes     Within the past 12 months, did the food you bought just not last and you didn t have money to get more?: No   Transportation Needs: High Risk (1/3/2024)    Transportation Needs     Within the past 12 months, has lack of  transportation kept you from medical appointments, getting your medicines, non-medical meetings or appointments, work, or from getting things that you need?: Yes   Housing Stability: High Risk (1/3/2024)    Housing Stability     Do you have housing? : Yes     Are you worried about losing your housing?: Yes           Review of Systems        Current Outpatient Medications:     ACCU-CHEK GUIDE test strip, USE TO TEST BLOOD SUGAR 6 TIMES DAILY OR AS DIRECTED (Patient not taking: Reported on 2/16/2024), Disp: 600 strip, Rfl: 3    acetaminophen (TYLENOL) 325 MG tablet, Take 2 tablets (650 mg) by mouth every 4 hours as needed for mild pain (to moderate pain) (Patient not taking: Reported on 1/26/2024), Disp: , Rfl:     albuterol (PROAIR HFA/PROVENTIL HFA/VENTOLIN HFA) 108 (90 Base) MCG/ACT inhaler, Inhale 2 puffs into the lungs every 4 hours as needed for wheezing or shortness of breath, Disp: 18 g, Rfl: 1    ammonium lactate (LAC-HYDRIN) 12 % external cream, Apply topically 2 times daily as needed for dry skin, Disp: 385 g, Rfl: 3    azelastine (ASTELIN) 0.1 % nasal spray, Spray 2 sprays into both nostrils 2 times daily as needed for rhinitis, Disp: 30 mL, Rfl: 3    azelastine (OPTIVAR) 0.05 % ophthalmic solution, Apply 1 drop to eye 2 times daily as needed (itchy/watery/red eyes), Disp: 6 mL, Rfl: 11    blood glucose (ONETOUCH VERIO IQ) test strip, Use to test blood sugar 2 times daily or as directed. (Patient not taking: Reported on 2/16/2024), Disp: 100 strip, Rfl: 11    Blood Glucose Monitoring Suppl (ONETOUCH VERIO FLEX SYSTEM) w/Device KIT, 1 each as needed (Patient not taking: Reported on 2/16/2024), Disp: 1 kit, Rfl: 0    budesonide-formoterol (SYMBICORT) 80-4.5 MCG/ACT Inhaler, Inhale 2 puffs into the lungs 2 times daily, Disp: 10.2 g, Rfl: 4    fluticasone (FLONASE) 50 MCG/ACT nasal spray, Spray 2 sprays into both nostrils daily, Disp: 15.8 mL, Rfl: 11    gabapentin (NEURONTIN) 300 MG capsule, Take 2 capsules  (600 mg) by mouth At Bedtime (Patient not taking: Reported on 2/16/2024), Disp: 60 capsule, Rfl: 5    hydrocortisone 2.5 % ointment, Apply sparingly to affected area two times daily as needed but not more than 14 days in a row. Spare face, armpits, neck, and groin., Disp: 30 g, Rfl: 1    ibuprofen (ADVIL/MOTRIN) 200 MG tablet, Take 200 mg by mouth every 4 hours as needed for pain, Disp: , Rfl:     insulin pen needle (BD PEN NEEDLE ROZ 2ND GEN) 32G X 4 MM miscellaneous, USE 1 PEN NEEDLES DAILY WITH VICTOZA (Patient not taking: Reported on 2/16/2024), Disp: 100 each, Rfl: 3    ketoconazole (NIZORAL) 2 % external cream, Apply topically 2 times daily X14 days, Disp: , Rfl:     lamoTRIgine (LAMICTAL) 200 MG tablet, Take 200 mg by mouth at bedtime Also 25mg in the AM and 25mg at llunch time., Disp: , Rfl:     lamoTRIgine (LAMICTAL) 25 MG tablet, TAKE 1 TABLET BY MOUTH TWICE A DAY IN THE MORNING AND AFTERNOON. (Patient not taking: Reported on 7/19/2023), Disp: , Rfl:     levocetirizine (XYZAL) 5 MG tablet, Take 1 tablet (5 mg) by mouth every evening (Patient not taking: Reported on 1/3/2024), Disp: 90 tablet, Rfl: 1    levonorgestrel (MIRENA) 20 MCG/DAY IUD, 1 each (20 mcg) by Intrauterine route once, Disp: , Rfl:     levothyroxine (SYNTHROID/LEVOTHROID) 50 MCG tablet, TAKE 1 TABLET (50 MCG) BY MOUTH DAILY BEFORE BREAKFAST, Disp: 90 tablet, Rfl: 3    liraglutide (VICTOZA PEN) 18 MG/3ML solution, INJECT 1.8 MG UNDER THE SKIN ONCE DAILY, Disp: 9 mL, Rfl: 3    lithium ER (LITHOBID) 300 MG CR tablet, Take 300 mg by mouth daily, Disp: , Rfl:     Melatonin 10 MG TABS tablet, Take 10 mg by mouth at bedtime, Disp: , Rfl:     naltrexone (DEPADE/REVIA) 50 MG tablet, Take 0.5 tablets by mouth daily at 2 pm, Disp: , Rfl:     norelgestromin-ethinyl estradiol (ORTHO EVRA) 150-35 MCG/24HR patch, Remove old patch and apply new patch onto the skin once a week for 3 weeks (21 days). Do not wear patch week 4 (days 22-28), then repeat.,  Disp: 9 patch, Rfl: 3    OneTouch Delica Lancets 33G MISC, 1 each 2 times daily (Patient not taking: Reported on 2/16/2024), Disp: 100 each, Rfl: 11    propranolol (INDERAL) 10 MG tablet, Take 10 mg by mouth 3 times daily as needed, Disp: , Rfl:     risperiDONE (RISPERDAL) 1 MG tablet, Take 1 mg by mouth at bedtime (Patient not taking: Reported on 2/16/2024), Disp: , Rfl:     risperiDONE microspheres ER (RISPERDAL CONSTA) 50 MG injection, Next due 6/30, Disp: , Rfl:     spacer ([facility] APOLINAR) holding chamber, Use with Symbicort and albuterol inhalers as prescribed, Disp: 1 each, Rfl: 0    triamcinolone (KENALOG) 0.1 % external ointment, APPLY TOPICALLY 2 TIMES DAILY FOR UP TO 2 WEEKS, Disp: 30 g, Rfl: 1    venlafaxine (EFFEXOR XR) 75 MG 24 hr capsule, 150 mg daily, Disp: , Rfl:     vitamin D3 (CHOLECALCIFEROL) 50 mcg (2000 units) tablet, Take 1 tablet (50 mcg) by mouth daily, Disp: 90 tablet, Rfl: 3  Immunization History   Administered Date(s) Administered    COVID-19 Bivalent 12+ (Pfizer) 10/14/2022    COVID-19 MONOVALENT 12+ (Pfizer) 05/18/2021, 06/08/2021, 01/19/2022    DTAP (<7y) 03/01/1985, 06/01/1985, 07/01/1985, 07/01/1986, 07/26/1990    Flu 65+ Years 12/09/2008, 12/06/2012    HPV Quadrivalent 12/12/2008, 09/06/2011    Hepatitis B, Peds 02/16/1998    Historical DTP/aP 03/01/1985, 06/01/1985, 07/01/1985, 07/01/1986, 07/26/1990    Historical Hepb 02/16/1998    Influenza (IIV3) PF 12/09/2008, 12/06/2012    Influenza Vaccine >6 months,quad, PF 11/21/2017, 01/03/2019, 09/17/2019, 09/16/2020, 10/14/2022    MMR 02/27/1986, 07/17/1997    Poliovirus, inactivated (IPV) 06/01/1985, 07/01/1985, 01/01/1987, 07/26/1990    TD,PF 7+ (Tenivac) 07/06/2023    TDAP Vaccine (Adacel) 06/30/1997, 03/31/2008, 05/14/2010, 12/06/2012    Td (Adult), Adsorbed 06/30/1997     Allergies   Allergen Reactions    Dust Mites Shortness Of Breath    Animal Dander      Other reaction(s): *Unknown    Mold      Other reaction(s): Runny Nose     Trees      OBJECTIVE:                                                                 [facility] 12/25/2023 (Approximate)         Physical Exam          WORKUP: {Allergy Evaluation:946525}    ASSESSMENT/PLAN:    There are no diagnoses linked to this encounter.     Follow up in *** or sooner if needed.    Thank you for allowing us to participate in the care of this patient. Please feel free to contact us if there are any questions or concerns about the patient.    Disclaimer: This note consists of symbols derived from keyboarding, dictation and/or voice recognition software. As a result, there may be errors in the script that have gone undetected. Please consider this when interpreting information found in this chart.    Rudy Shin MD, FAAAAI, FACAAI  Allergy, Asthma and Immunology     [facility]

## 2024-02-29 NOTE — PROGRESS NOTES
Maddy Ortiz is a 38 year old female who is being evaluated via a billable video visit.      How would you like to obtain your AVS? MyChart  If the video visit is dropped, the invitation should be resent by: Send to e-mail at: [email or web address]  Will anyone else be joining your video visit? No          Subjective   Patient presents with:  Allergy Recheck: Has a lot of nasal congestion and nose is sore  Currently taking a lot of pseudoephedrine this week        HPI   Maddy Ortiz is a 38 year old female who presents today to our Allergy Clinic at [facility] for a follow up visit via billable video visit.     Percutaneous skin puncture testing for aeroallergens performed in November 2016 showed sensitivity to dog, cat, dust mite, molds, pollen of trees, grass, and weeds. In spring-summer 2018, she had a buildup using cluster schedule for allergen immunotherapy.  She reached maintenance dose in July 2018.  One episode of anaphylaxis on September 8, 2020, due to allergen immunotherapy, Red 1:1, 0.5mL   Was given epi x 2.  She tolerated a lower dose x 1 after that. She stopped taking hydroxyzine.  Take cetirizine 10 mg by mouth once daily.  Sometimes, when symptoms are worse, she may take 20 mg by mouth.  She stopped allergen immunotherapy after September 22, 2020.  She has a history of dyspnea on exertion. She was asked several times if albuterol inhaler was helpful or not, and she was not sure. In November, she had PFT, which showed a mild obstructive/restrictive pattern. There was a questionable postbronchodilator partial reversibility.      Maddy has been using Symbicort daily. States chest symptoms are well-controlled.    From the standpoint of allergic rhinitis, she has been using intranasal fluticasone 2 sprays in each nostril once daily and azelastine 2 sprays in each nostril twice daily as needed. Last week, she developed respiratory infection like symptoms manifested by nasal  congestion, sinus pressure/facial pain, and postnasal drainage. Got worse this week. Denies fever.    In response to these symptoms, Maddy began treatment with Dimenhydrinate and pseudoephedrine several days ago. She found these meds at home. Concurrently, she has experienced migraines and significant drowsiness. Additionally, she notes that her postnasal drainage has turned yellow.         Patient Active Problem List   Diagnosis    Animal dander allergy    CARDIOVASCULAR SCREENING; LDL GOAL LESS THAN 160    Psychosis (H)    Paranoid type delusional disorder (H)    Bipolar 1 disorder (H)    Insomnia    Depression with anxiety    Seasonal allergic rhinitis due to pollen    Allergic rhinitis due to mold    Allergic rhinitis due to animal dander    Allergic rhinitis due to dust mite    Encounter for IUD insertion    Schizoaffective disorder, bipolar type (H)    Gastroesophageal reflux disease without esophagitis    Paranoia (psychosis) (H)    Morbid obesity (H)    Schizoaffective disorder (H)    Umbilical hernia without obstruction and without gangrene    Fatty liver    Type 2 diabetes mellitus without complication, without long-term current use of insulin (H)    Elevated LFTs    Disorganized behavior    Infection due to 2019 novel coronavirus    Polypharmacy    Sedated due to multiple medications    Overdose, undetermined intent, initial encounter    Segmental and somatic dysfunction    SI (sacroiliac) joint dysfunction    mirena IUD 9/30/22    Neck pain    Hip pain, right    Chronic pain of both knees    Difficulty communicating       Past Medical History:   Diagnosis Date    Bipolar 1 disorder (H) 12/6/2012    Depressive disorder     Diabetes mellitus, type 2 (H) 12/13/2021    Paranoid type delusional disorder (H) 11/14/2012      *Patient is Adopted           Problem (# of Occurrences) Relation (Name,Age of Onset)    Unknown/Adopted (3) Mother, Father, Other    Hypertension (1) Sister          Past Surgical History:    Procedure Laterality Date    TONSILLECTOMY & ADENOIDECTOMY      as a child    TONSILLECTOMY & ADENOIDECTOMY       Social History     Socioeconomic History    Marital status: Single     Spouse name: None    Number of children: None    Years of education: None    Highest education level: None   Occupational History     Employer: CURRENTLY UNEMPLOYED AT THIS TIME   Tobacco Use    Smoking status: Former     Packs/day: .5     Types: Cigarettes    Smokeless tobacco: Former    Tobacco comments:     around 2nd hand smoke   Vaping Use    Vaping Use: Never used   Substance and Sexual Activity    Alcohol use: Not Currently    Drug use: Not Currently    Sexual activity: Not Currently     Partners: Male     Birth control/protection: Abstinence, None, I.U.D.   Other Topics Concern    Parent/sibling w/ CABG, MI or angioplasty before 65F 55M? No   Social History Narrative    One child    Education: some college        02/29/24        ENVIRONMENTAL HISTORY: [place] The home is heated with a electric furnace. They do not have central air conditioning, does have box air conditioner in the wall and has air purifier. The patient's bedroom is furnished with stuffed animals in bed, carpeting in bedroom and fabric window coverings. Pets inside the apartment includes 1 cat. There is history of cockroach or mice infestation. There are no smokers in the house.  The apartment does not have a basement.          Social Determinants of Health     Financial Resource Strain: Low Risk  (1/3/2024)    Financial Resource Strain     Within the past 12 months, have you or your family members you live with been unable to get utilities (heat, electricity) when it was really needed?: No   Food Insecurity: High Risk (1/3/2024)    Food Insecurity     Within the past 12 months, did you worry that your food would run out before you got money to buy more?: Yes     Within the past 12 months, did the food you  bought just not last and you didn t have money to get more?: No   Transportation Needs: High Risk (1/3/2024)    Transportation Needs     Within the past 12 months, has lack of transportation kept you from medical appointments, getting your medicines, non-medical meetings or appointments, work, or from getting things that you need?: Yes   Housing Stability: High Risk (1/3/2024)    Housing Stability     Do you have housing? : Yes     Are you worried about losing your housing?: Yes             Current Outpatient Medications:     ACCU-CHEK GUIDE test strip, USE TO TEST BLOOD SUGAR 6 TIMES DAILY OR AS DIRECTED, Disp: 600 strip, Rfl: 3    amoxicillin-clavulanate (AUGMENTIN) 875-125 MG tablet, Take 1 tablet by mouth 2 times daily for 10 days, Disp: 20 tablet, Rfl: 0    blood glucose (ONETOUCH VERIO IQ) test strip, Use to test blood sugar 2 times daily or as directed., Disp: 100 strip, Rfl: 11    Blood Glucose Monitoring Suppl (ONETOUCH VERIO FLEX SYSTEM) w/Device KIT, 1 each as needed, Disp: 1 kit, Rfl: 0    budesonide-formoterol (SYMBICORT) 80-4.5 MCG/ACT Inhaler, Inhale 2 puffs into the lungs 2 times daily, Disp: 10.2 g, Rfl: 4    fluticasone (FLONASE) 50 MCG/ACT nasal spray, Spray 2 sprays into both nostrils daily, Disp: 15.8 mL, Rfl: 11    gabapentin (NEURONTIN) 300 MG capsule, Take 2 capsules (600 mg) by mouth At Bedtime, Disp: 60 capsule, Rfl: 5    ibuprofen (ADVIL/MOTRIN) 200 MG tablet, Take 200 mg by mouth every 4 hours as needed for pain, Disp: , Rfl:     insulin pen needle (BD PEN NEEDLE ROZ 2ND GEN) 32G X 4 MM miscellaneous, USE 1 PEN NEEDLES DAILY WITH VICTOZA, Disp: 100 each, Rfl: 3    ketoconazole (NIZORAL) 2 % external cream, Apply topically 2 times daily X14 days, Disp: , Rfl:     levocetirizine (XYZAL) 5 MG tablet, Take 1 tablet (5 mg) by mouth every evening, Disp: 90 tablet, Rfl: 1    levonorgestrel (MIRENA) 20 MCG/DAY IUD, 1 each (20 mcg) by Intrauterine route once, Disp: , Rfl:     levothyroxine  (SYNTHROID/LEVOTHROID) 50 MCG tablet, TAKE 1 TABLET (50 MCG) BY MOUTH DAILY BEFORE BREAKFAST, Disp: 90 tablet, Rfl: 3    liraglutide (VICTOZA PEN) 18 MG/3ML solution, INJECT 1.8 MG UNDER THE SKIN ONCE DAILY, Disp: 9 mL, Rfl: 3    lithium ER (LITHOBID) 300 MG CR tablet, Take 300 mg by mouth daily, Disp: , Rfl:     Melatonin 10 MG TABS tablet, Take 10 mg by mouth at bedtime, Disp: , Rfl:     naltrexone (DEPADE/REVIA) 50 MG tablet, Take 0.5 tablets by mouth daily at 2 pm, Disp: , Rfl:     OneTouch Delica Lancets 33G MISC, 1 each 2 times daily, Disp: 100 each, Rfl: 11    propranolol (INDERAL) 10 MG tablet, Take 10 mg by mouth 3 times daily as needed, Disp: , Rfl:     risperiDONE microspheres ER (RISPERDAL CONSTA) 50 MG injection, Next due 6/30, Disp: , Rfl:     spacer (OPTICHAMBER APOLINAR) holding chamber, Use with Symbicort and albuterol inhalers as prescribed, Disp: 1 each, Rfl: 0    triamcinolone (KENALOG) 0.1 % external ointment, APPLY TOPICALLY 2 TIMES DAILY FOR UP TO 2 WEEKS, Disp: 30 g, Rfl: 1    venlafaxine (EFFEXOR XR) 75 MG 24 hr capsule, 150 mg daily, Disp: , Rfl:     vitamin D3 (CHOLECALCIFEROL) 50 mcg (2000 units) tablet, Take 1 tablet (50 mcg) by mouth daily, Disp: 90 tablet, Rfl: 3    acetaminophen (TYLENOL) 325 MG tablet, Take 2 tablets (650 mg) by mouth every 4 hours as needed for mild pain (to moderate pain) (Patient not taking: Reported on 1/26/2024), Disp: , Rfl:     albuterol (PROAIR HFA/PROVENTIL HFA/VENTOLIN HFA) 108 (90 Base) MCG/ACT inhaler, Inhale 2 puffs into the lungs every 4 hours as needed for wheezing or shortness of breath (Patient not taking: Reported on 2/29/2024), Disp: 18 g, Rfl: 1    ammonium lactate (LAC-HYDRIN) 12 % external cream, Apply topically 2 times daily as needed for dry skin (Patient not taking: Reported on 2/29/2024), Disp: 385 g, Rfl: 3    azelastine (ASTELIN) 0.1 % nasal spray, Spray 2 sprays into both nostrils 2 times daily as needed for rhinitis (Patient not taking:  Reported on 2/29/2024), Disp: 30 mL, Rfl: 3    azelastine (OPTIVAR) 0.05 % ophthalmic solution, Apply 1 drop to eye 2 times daily as needed (itchy/watery/red eyes) (Patient not taking: Reported on 2/29/2024), Disp: 6 mL, Rfl: 11    hydrocortisone 2.5 % ointment, Apply sparingly to affected area two times daily as needed but not more than 14 days in a row. Spare face, armpits, neck, and groin. (Patient not taking: Reported on 2/29/2024), Disp: 30 g, Rfl: 1    lamoTRIgine (LAMICTAL) 200 MG tablet, Take 200 mg by mouth at bedtime Also 25mg in the AM and 25mg at llunch time. (Patient not taking: Reported on 2/29/2024), Disp: , Rfl:     lamoTRIgine (LAMICTAL) 25 MG tablet, TAKE 1 TABLET BY MOUTH TWICE A DAY IN THE MORNING AND AFTERNOON. (Patient not taking: Reported on 7/19/2023), Disp: , Rfl:     norelgestromin-ethinyl estradiol (ORTHO EVRA) 150-35 MCG/24HR patch, Remove old patch and apply new patch onto the skin once a week for 3 weeks (21 days). Do not wear patch week 4 (days 22-28), then repeat. (Patient not taking: Reported on 2/29/2024), Disp: 9 patch, Rfl: 3    risperiDONE (RISPERDAL) 1 MG tablet, Take 1 mg by mouth at bedtime (Patient not taking: Reported on 2/29/2024), Disp: , Rfl:           Objective           Vitals:  [facility] 12/25/2023 (Approximate)     Physical Exam  Vitals and nursing note reviewed.   Constitutional:       General: She is not in acute distress.     Appearance: She is not toxic-appearing.   HENT:      Head: Normocephalic and atraumatic.      Right Ear: External ear normal.      Left Ear: External ear normal.   Eyes:      General:         Right eye: No discharge.         Left eye: No discharge.      Conjunctiva/sclera: Conjunctivae normal.   Pulmonary:      Effort: Pulmonary effort is normal. No respiratory distress.      Breath sounds: No stridor.   Neurological:      Mental Status: She is alert.   Psychiatric:      Comments: Her mood and behavior today are not different from previous visits.   She does have a somewhat flat affect.             Video-Visit Details    Type of service:  Video Visit     Originating Location (pt. Location): Home    Distant Location (provider location):  On-site  Platform used for Video Visit: [facility]         Physician has received verbal consent for a Video Visit from the patient? Yes    Joined the call at 2/29/2024, 4:33:33 pm.  Left the call at 2/29/2024, 4:51:00 pm.  You were on the call for 17 minutes 26 seconds .    ASSESSMENT AND PLAN:    Seasonal allergic rhinitis due to pollen  Allergic rhinitis due to mold  Allergic rhinitis due to animal dander  Allergic rhinitis due to dust mite  Acute sinusitis   Other fatigue    During our discussion, we addressed the concern that dimenhydrinate may be the cause of excessive drowsiness for Maddy.   - I recommend to discontinue it.    -Continue intranasal fluticasone (Flonase) 1-2 sprays in each nostril once daily.  -Continue azelastine nasal spray, 2 sprays in each nostril twice daily as needed.    We also discussed the potential for migraines to worsen following the use of pseudoephedrine, a known side effect. Maddy has been advised to monitor her symptoms closely in relation to pseudoephedrine intake.    - I would recommend to add NetiPot twice daily while she is sick.  Unfortunately, she changed her appointment from in person to virtual visit the last minute.  Given the severity of nasal congestion, discolored postnasal drainage, along with the significant discomfort caused by her symptoms over the past week, we decided to initiate treatment with Augmentin earlier than the typical 10 to 14-day waiting period post-symptom onset.    - amoxicillin-clavulanate (AUGMENTIN) 875-125 MG tablet  Dispense: 20 tablet; Refill: 0      Disclaimer: This note consists of symbols derived from keyboarding, dictation and/or voice recognition software. As a result, there may be errors in the script that have gone undetected. Please consider this  when interpreting information found in this chart.     Rudy Shin MD, FAAAAI, FACAAI  Allergy, Asthma and Immunology     [facility]

## 2024-02-29 NOTE — LETTER
2/29/2024         RE: Maddy Ortiz  [address]  [facility] [id number]        Dear Colleague,    Thank you for referring your patient, Maddy Ortiz, to the [facility]. Please see a copy of my visit note below.      Maddy Ortiz is a 38 year old female who is being evaluated via a billable video visit.      How would you like to obtain your AVS? MyChart  If the video visit is dropped, the invitation should be resent by: Send to e-mail at: [email or web address]  Will anyone else be joining your video visit? No          Subjective   Patient presents with:  Allergy Recheck: Has a lot of nasal congestion and nose is sore  Currently taking a lot of pseudoephedrine this week        HPI   Maddy Ortiz is a 38 year old female who presents today to our Allergy Clinic at [facility] for a follow up visit via billable video visit.     Percutaneous skin puncture testing for aeroallergens performed in November 2016 showed sensitivity to dog, cat, dust mite, molds, pollen of trees, grass, and weeds. In spring-summer 2018, she had a buildup using cluster schedule for allergen immunotherapy.  She reached maintenance dose in July 2018.  One episode of anaphylaxis on September 8, 2020, due to allergen immunotherapy, Red 1:1, 0.5mL   Was given epi x 2.  She tolerated a lower dose x 1 after that. She stopped taking hydroxyzine.  Take cetirizine 10 mg by mouth once daily.  Sometimes, when symptoms are worse, she may take 20 mg by mouth.  She stopped allergen immunotherapy after September 22, 2020.  She has a history of dyspnea on exertion. She was asked several times if albuterol inhaler was helpful or not, and she was not sure. In November, she had PFT, which showed a mild obstructive/restrictive pattern. There was a questionable postbronchodilator partial reversibility.      Maddy has been using Symbicort daily. States chest symptoms are well-controlled.    From the  standpoint of allergic rhinitis, she has been using intranasal fluticasone 2 sprays in each nostril once daily and azelastine 2 sprays in each nostril twice daily as needed. Last week, she developed respiratory infection like symptoms manifested by nasal congestion, sinus pressure/facial pain, and postnasal drainage. Got worse this week. Denies fever.    In response to these symptoms, Maddy began treatment with Dimenhydrinate and pseudoephedrine several days ago. She found these meds at home. Concurrently, she has experienced migraines and significant drowsiness. Additionally, she notes that her postnasal drainage has turned yellow.         Patient Active Problem List   Diagnosis     Animal dander allergy     CARDIOVASCULAR SCREENING; LDL GOAL LESS THAN 160     Psychosis (H)     Paranoid type delusional disorder (H)     Bipolar 1 disorder (H)     Insomnia     Depression with anxiety     Seasonal allergic rhinitis due to pollen     Allergic rhinitis due to mold     Allergic rhinitis due to animal dander     Allergic rhinitis due to dust mite     Encounter for IUD insertion     Schizoaffective disorder, bipolar type (H)     Gastroesophageal reflux disease without esophagitis     Paranoia (psychosis) (H)     Morbid obesity (H)     Schizoaffective disorder (H)     Umbilical hernia without obstruction and without gangrene     Fatty liver     Type 2 diabetes mellitus without complication, without long-term current use of insulin (H)     Elevated LFTs     Disorganized behavior     Infection due to 2019 novel coronavirus     Polypharmacy     Sedated due to multiple medications     Overdose, undetermined intent, initial encounter     Segmental and somatic dysfunction     SI (sacroiliac) joint dysfunction     mirena IUD 9/30/22     Neck pain     Hip pain, right     Chronic pain of both knees     Difficulty communicating       Past Medical History:   Diagnosis Date     Bipolar 1 disorder (H) 12/6/2012     Depressive disorder       Diabetes mellitus, type 2 (H) 12/13/2021     Paranoid type delusional disorder (H) 11/14/2012      *Patient is Adopted           Problem (# of Occurrences) Relation (Name,Age of Onset)    Unknown/Adopted (3) Mother, Father, Other    Hypertension (1) Sister          Past Surgical History:   Procedure Laterality Date     TONSILLECTOMY & ADENOIDECTOMY      as a child     TONSILLECTOMY & ADENOIDECTOMY       Social History     Socioeconomic History     Marital status: Single     Spouse name: None     Number of children: None     Years of education: None     Highest education level: None   Occupational History     Employer: CURRENTLY UNEMPLOYED AT THIS TIME   Tobacco Use     Smoking status: Former     Packs/day: .5     Types: Cigarettes     Smokeless tobacco: Former     Tobacco comments:     around 2nd hand smoke   Vaping Use     Vaping Use: Never used   Substance and Sexual Activity     Alcohol use: Not Currently     Drug use: Not Currently     Sexual activity: Not Currently     Partners: Male     Birth control/protection: Abstinence, None, I.U.D.   Other Topics Concern     Parent/sibling w/ CABG, MI or angioplasty before 65F 55M? No   Social History Narrative    One child    Education: some college        02/29/24        ENVIRONMENTAL HISTORY: [place] The home is heated with a electric furnace. They do not have central air conditioning, does have box air conditioner in the wall and has air purifier. The patient's bedroom is furnished with stuffed animals in bed, carpeting in bedroom and fabric window coverings. Pets inside the apartment includes 1 cat. There is history of cockroach or mice infestation. There are no smokers in the house.  The apartment does not have a basement.          Social Determinants of Health     Financial Resource Strain: Low Risk  (1/3/2024)    Financial Resource Strain      Within the past 12 months, have you or your family members you  live with been unable to get utilities (heat, electricity) when it was really needed?: No   Food Insecurity: High Risk (1/3/2024)    Food Insecurity      Within the past 12 months, did you worry that your food would run out before you got money to buy more?: Yes      Within the past 12 months, did the food you bought just not last and you didn t have money to get more?: No   Transportation Needs: High Risk (1/3/2024)    Transportation Needs      Within the past 12 months, has lack of transportation kept you from medical appointments, getting your medicines, non-medical meetings or appointments, work, or from getting things that you need?: Yes   Housing Stability: High Risk (1/3/2024)    Housing Stability      Do you have housing? : Yes      Are you worried about losing your housing?: Yes             Current Outpatient Medications:      ACCU-CHEK GUIDE test strip, USE TO TEST BLOOD SUGAR 6 TIMES DAILY OR AS DIRECTED, Disp: 600 strip, Rfl: 3     amoxicillin-clavulanate (AUGMENTIN) 875-125 MG tablet, Take 1 tablet by mouth 2 times daily for 10 days, Disp: 20 tablet, Rfl: 0     blood glucose (ONETOUCH VERIO IQ) test strip, Use to test blood sugar 2 times daily or as directed., Disp: 100 strip, Rfl: 11     Blood Glucose Monitoring Suppl (ONETOUCH VERIO FLEX SYSTEM) w/Device KIT, 1 each as needed, Disp: 1 kit, Rfl: 0     budesonide-formoterol (SYMBICORT) 80-4.5 MCG/ACT Inhaler, Inhale 2 puffs into the lungs 2 times daily, Disp: 10.2 g, Rfl: 4     fluticasone (FLONASE) 50 MCG/ACT nasal spray, Spray 2 sprays into both nostrils daily, Disp: 15.8 mL, Rfl: 11     gabapentin (NEURONTIN) 300 MG capsule, Take 2 capsules (600 mg) by mouth At Bedtime, Disp: 60 capsule, Rfl: 5     ibuprofen (ADVIL/MOTRIN) 200 MG tablet, Take 200 mg by mouth every 4 hours as needed for pain, Disp: , Rfl:      insulin pen needle (BD PEN NEEDLE ROZ 2ND GEN) 32G X 4 MM miscellaneous, USE 1 PEN NEEDLES DAILY WITH VICTOZA, Disp: 100 each, Rfl: 3      ketoconazole (NIZORAL) 2 % external cream, Apply topically 2 times daily X14 days, Disp: , Rfl:      levocetirizine (XYZAL) 5 MG tablet, Take 1 tablet (5 mg) by mouth every evening, Disp: 90 tablet, Rfl: 1     levonorgestrel (MIRENA) 20 MCG/DAY IUD, 1 each (20 mcg) by Intrauterine route once, Disp: , Rfl:      levothyroxine (SYNTHROID/LEVOTHROID) 50 MCG tablet, TAKE 1 TABLET (50 MCG) BY MOUTH DAILY BEFORE BREAKFAST, Disp: 90 tablet, Rfl: 3     liraglutide (VICTOZA PEN) 18 MG/3ML solution, INJECT 1.8 MG UNDER THE SKIN ONCE DAILY, Disp: 9 mL, Rfl: 3     lithium ER (LITHOBID) 300 MG CR tablet, Take 300 mg by mouth daily, Disp: , Rfl:      Melatonin 10 MG TABS tablet, Take 10 mg by mouth at bedtime, Disp: , Rfl:      naltrexone (DEPADE/REVIA) 50 MG tablet, Take 0.5 tablets by mouth daily at 2 pm, Disp: , Rfl:      OneTouch Delica Lancets 33G MISC, 1 each 2 times daily, Disp: 100 each, Rfl: 11     propranolol (INDERAL) 10 MG tablet, Take 10 mg by mouth 3 times daily as needed, Disp: , Rfl:      risperiDONE microspheres ER (RISPERDAL CONSTA) 50 MG injection, Next due 6/30, Disp: , Rfl:      spacer (OPTICHAMBER APOLINAR) holding chamber, Use with Symbicort and albuterol inhalers as prescribed, Disp: 1 each, Rfl: 0     triamcinolone (KENALOG) 0.1 % external ointment, APPLY TOPICALLY 2 TIMES DAILY FOR UP TO 2 WEEKS, Disp: 30 g, Rfl: 1     venlafaxine (EFFEXOR XR) 75 MG 24 hr capsule, 150 mg daily, Disp: , Rfl:      vitamin D3 (CHOLECALCIFEROL) 50 mcg (2000 units) tablet, Take 1 tablet (50 mcg) by mouth daily, Disp: 90 tablet, Rfl: 3     acetaminophen (TYLENOL) 325 MG tablet, Take 2 tablets (650 mg) by mouth every 4 hours as needed for mild pain (to moderate pain) (Patient not taking: Reported on 1/26/2024), Disp: , Rfl:      albuterol (PROAIR HFA/PROVENTIL HFA/VENTOLIN HFA) 108 (90 Base) MCG/ACT inhaler, Inhale 2 puffs into the lungs every 4 hours as needed for wheezing or shortness of breath (Patient not taking: Reported on  2/29/2024), Disp: 18 g, Rfl: 1     ammonium lactate (LAC-HYDRIN) 12 % external cream, Apply topically 2 times daily as needed for dry skin (Patient not taking: Reported on 2/29/2024), Disp: 385 g, Rfl: 3     azelastine (ASTELIN) 0.1 % nasal spray, Spray 2 sprays into both nostrils 2 times daily as needed for rhinitis (Patient not taking: Reported on 2/29/2024), Disp: 30 mL, Rfl: 3     azelastine (OPTIVAR) 0.05 % ophthalmic solution, Apply 1 drop to eye 2 times daily as needed (itchy/watery/red eyes) (Patient not taking: Reported on 2/29/2024), Disp: 6 mL, Rfl: 11     hydrocortisone 2.5 % ointment, Apply sparingly to affected area two times daily as needed but not more than 14 days in a row. Spare face, armpits, neck, and groin. (Patient not taking: Reported on 2/29/2024), Disp: 30 g, Rfl: 1     lamoTRIgine (LAMICTAL) 200 MG tablet, Take 200 mg by mouth at bedtime Also 25mg in the AM and 25mg at llunch time. (Patient not taking: Reported on 2/29/2024), Disp: , Rfl:      lamoTRIgine (LAMICTAL) 25 MG tablet, TAKE 1 TABLET BY MOUTH TWICE A DAY IN THE MORNING AND AFTERNOON. (Patient not taking: Reported on 7/19/2023), Disp: , Rfl:      norelgestromin-ethinyl estradiol (ORTHO EVRA) 150-35 MCG/24HR patch, Remove old patch and apply new patch onto the skin once a week for 3 weeks (21 days). Do not wear patch week 4 (days 22-28), then repeat. (Patient not taking: Reported on 2/29/2024), Disp: 9 patch, Rfl: 3     risperiDONE (RISPERDAL) 1 MG tablet, Take 1 mg by mouth at bedtime (Patient not taking: Reported on 2/29/2024), Disp: , Rfl:           Objective           Vitals:  LMP 12/25/2023 (Approximate)     Physical Exam  Vitals and nursing note reviewed.   Constitutional:       General: She is not in acute distress.     Appearance: She is not toxic-appearing.   HENT:      Head: Normocephalic and atraumatic.      Right Ear: External ear normal.      Left Ear: External ear normal.   Eyes:      General:         Right eye: No  discharge.         Left eye: No discharge.      Conjunctiva/sclera: Conjunctivae normal.   Pulmonary:      Effort: Pulmonary effort is normal. No respiratory distress.      Breath sounds: No stridor.   Neurological:      Mental Status: She is alert.   Psychiatric:      Comments: Her mood and behavior today are not different from previous visits.  She does have a somewhat flat affect.             Video-Visit Details    Type of service:  Video Visit     Originating Location (pt. Location): Home    Distant Location (provider location):  On-site  Platform used for Video Visit: [facility]         Physician has received verbal consent for a Video Visit from the patient? Yes    Joined the call at 2/29/2024, 4:33:33 pm.  Left the call at 2/29/2024, 4:51:00 pm.  You were on the call for 17 minutes 26 seconds .    ASSESSMENT AND PLAN:    Seasonal allergic rhinitis due to pollen  Allergic rhinitis due to mold  Allergic rhinitis due to animal dander  Allergic rhinitis due to dust mite  Acute sinusitis   Other fatigue    During our discussion, we addressed the concern that dimenhydrinate may be the cause of excessive drowsiness for Maddy.   - I recommend to discontinue it.    -Continue intranasal fluticasone (Flonase) 1-2 sprays in each nostril once daily.  -Continue azelastine nasal spray, 2 sprays in each nostril twice daily as needed.    We also discussed the potential for migraines to worsen following the use of pseudoephedrine, a known side effect. Maddy has been advised to monitor her symptoms closely in relation to pseudoephedrine intake.    - I would recommend to add NetiPot twice daily while she is sick.  Unfortunately, she changed her appointment from in person to virtual visit the last minute.  Given the severity of nasal congestion, discolored postnasal drainage, along with the significant discomfort caused by her symptoms over the past week, we decided to initiate treatment with Augmentin earlier than the typical 10  to 14-day waiting period post-symptom onset.    - amoxicillin-clavulanate (AUGMENTIN) 875-125 MG tablet  Dispense: 20 tablet; Refill: 0      Disclaimer: This note consists of symbols derived from keyboarding, dictation and/or voice recognition software. As a result, there may be errors in the script that have gone undetected. Please consider this when interpreting information found in this chart.     Rudy Shin MD, FAAAAI, FACAAI  Allergy, Asthma and Immunology     [facility]            Again, thank you for allowing me to participate in the care of your patient.        Sincerely,        Rudy Shin MD

## 2024-03-01 DIAGNOSIS — R06.02 SHORTNESS OF BREATH: ICD-10-CM

## 2024-03-01 NOTE — TELEPHONE ENCOUNTER
Cued up 30.6 g with 1 refill if appropriate. Will forward to provider for review.     Callie VELASQUEZ RN  Specialty/Allergy Clinics

## 2024-03-01 NOTE — TELEPHONE ENCOUNTER
90 Day request  Requested Prescriptions   Pending Prescriptions Disp Refills    budesonide-formoterol (SYMBICORT) 80-4.5 MCG/ACT Inhaler 10.2 g 4     Sig: Inhale 2 puffs into the lungs 2 times daily       There is no refill protocol information for this order          Last office visit: 1/26/2024 ; last virtual visit: 2/29/2024 with prescribing provider:  Rudy Shin   Future Office Visit:      Thank you,  Radha BERRY [facility]  Specialty Clinic - [facility]

## 2024-03-03 ENCOUNTER — MYC MEDICAL ADVICE (OUTPATIENT)
Dept: FAMILY MEDICINE | Facility: CLINIC | Age: 39
End: 2024-03-03
Payer: COMMERCIAL

## 2024-03-03 RX ORDER — BUDESONIDE AND FORMOTEROL FUMARATE DIHYDRATE 80; 4.5 UG/1; UG/1
2 AEROSOL RESPIRATORY (INHALATION) 2 TIMES DAILY
Qty: 30.6 G | Refills: 1 | Status: SHIPPED | OUTPATIENT
Start: 2024-03-03 | End: 2024-08-02

## 2024-03-04 ENCOUNTER — VIRTUAL VISIT (OUTPATIENT)
Dept: SPEECH THERAPY | Facility: CLINIC | Age: 39
End: 2024-03-04
Attending: NURSE PRACTITIONER
Payer: COMMERCIAL

## 2024-03-04 DIAGNOSIS — F80.9: Primary | ICD-10-CM

## 2024-03-04 PROCEDURE — 92507 TX SP LANG VOICE COMM INDIV: CPT | Mod: GN,GT | Performed by: SPEECH-LANGUAGE PATHOLOGIST

## 2024-03-05 NOTE — PROGRESS NOTES
[facility]                                                                                   OUTPATIENT SPEECH LANGUAGE PATHOLOGY    PLAN OF TREATMENT FOR OUTPATIENT REHABILITATION   Patient's Last Name, First Name, Maddy Guardado YOB: 1985   Provider's Name   [facility]   Medical Record No.  [id number]     Onset Date:  10/16/23 Start of Care Date:  11/20/23     Medical Diagnosis:   Difficulty communicating      SLP Treatment Diagnosis:   Difficulty communicating Plan of Treatment  Frequency/Duration:  1x/wk  /   12 weeks    Certification date from  12/21/24   To    3/15/24        See note for plan of treatment details and functional goals     MEGAN Dey                         I CERTIFY THE NEED FOR THESE SERVICES FURNISHED UNDER        THIS PLAN OF TREATMENT AND WHILE UNDER MY CARE     (Physician attestation of this document indicates review and certification of the therapy plan).              Referring Provider:  Shirley Freeman    Initial Assessment  See Epic Evaluation-              PLAN  Continue therapy per current plan of care.    Beginning/End Dates of Progress Note Reporting Period:    11/20/23 to 12/20/2023    Referring Provider:  Shirley Freeman    Speech Therapy Progress Note  12/20/23    Appointment Info   Treating Provider Nadia Calle MA, CCC/SLP   Visits Used 2   Medical Diagnosis Difficulty communicating   SLP Tx Diagnosis Difficulty communicating   Progress Note/Certification   Start Of Care Date 11/20/23   Onset Of Illness/injury Or Date Of Surgery 10/16/23   Therapy Frequency 1x/wk   Predicted Duration 12 weeks   Certification date from 12/21/23   Certification date to 03/13/24   Progress Note Due Date 12/20/23   Progress Note Completed Date 02/20/24   Subjective Report   Subjective Report Pt accompanied by Nadja, her IHS helper today.   SLP Goal 1   Goal Identifier pragmatic skills   Goal Description  Pt will verbalize understanding of 3 pragmatic skills used in conversation to use as target focus in conversation. (ie eye contact, greetings, turn taking)   Rationale To maximize functional communication within the home or community   Goal Progress Education provided on topics of greetings and eyecontact.  Pt reports uncomfortable with eyecontact as feels she may come off as rude if looking.  Pt able to role play with eye contact for 2-3 minute conversation with SLP.  Pt made goal to try once/day at home for 2 min with dgtr or staff/helpers.  Education pt does not need to do if really uncomfortable but she would like to try.   Target Date 12/19/23   SLP Goal 2   Goal Identifier id/use strategies   Goal Description Pt will develop target strategy with SLP and IHS worker for use in community and social setting.   Rationale To maximize functional communication within the home or community   Target Date 01/04/24   Goal Progress DNT- focusing on strategies with familiar people.   SLP Goal 3   Goal Identifier home program   Goal Description Pt will utilize home program for generalization of strategies with communication partners across 3 visits.   Rationale To maximize functional communication within the home or community   Target Date 01/15/24   Goal Progress eye contact   Treatment Speech/Lang/Voice   Treatment of Speech, Language, Voice Communication&/or Auditory Processing (88851) 30 Minutes   Skilled Intervention Provided written and verbal information on.;Provided feedback on performance of tasks   Patient Response/Progress Pt started session with an appropriate greeting and introduction of her helper, Nadja.  Given goal for greeting at end of session pt was able to complete.  Pt able to have better eye contact during short conversation 50% of the time.  Pt would veer off with eyecontact but would return and have eyes open looking forward.   Plan   Home program try to maintain eye contact with daughter and helper  1x/day.   [place] in agreement and verbalized understanding on how to give feedback and encouragment for task.   Plan for next session progress eye contact goal, topic maintenance (was good today in short conv)   Comments   Comments cert date as pt did not complete/show for Jan visit.   Total Session Time   Total Treatment Time (sum of timed and untimed services) 30

## 2024-03-06 NOTE — TELEPHONE ENCOUNTER
I don't believe we have anger management classes, but I recommend patient to see therapist, no referral is needed, she can call clinic and schedule appointment with SABRA Hewitt, Behavioral Health Clinician. Maybe Nancy will have resources for patient for possible anger management therapy/classes.    PHILL Freed CNP , covering for Shirley

## 2024-03-15 ENCOUNTER — HOSPITAL ENCOUNTER (OUTPATIENT)
Dept: ULTRASOUND IMAGING | Facility: CLINIC | Age: 39
Discharge: HOME | End: 2024-03-15
Attending: OBSTETRICS & GYNECOLOGY | Admitting: OBSTETRICS & GYNECOLOGY
Payer: COMMERCIAL

## 2024-03-15 DIAGNOSIS — T83.32XS INTRAUTERINE CONTRACEPTIVE DEVICE THREADS LOST, SEQUELA: ICD-10-CM

## 2024-03-15 PROCEDURE — 76856 US EXAM PELVIC COMPLETE: CPT

## 2024-03-22 ENCOUNTER — VIRTUAL VISIT (OUTPATIENT)
Dept: OBGYN | Facility: CLINIC | Age: 39
End: 2024-03-22
Payer: COMMERCIAL

## 2024-03-22 VITALS — HEIGHT: 63 IN | WEIGHT: 230 LBS | BODY MASS INDEX: 40.75 KG/M2

## 2024-03-22 DIAGNOSIS — T83.32XS INTRAUTERINE CONTRACEPTIVE DEVICE THREADS LOST, SEQUELA: Primary | ICD-10-CM

## 2024-03-22 PROCEDURE — 99441 PR PHYSICIAN TELEPHONE EVALUATION 5-10 MIN: CPT | Mod: 93 | Performed by: OBSTETRICS & GYNECOLOGY

## 2024-03-22 NOTE — NURSING NOTE
"Initial Ht 1.6 m (5' 3\")   Wt 104.3 kg (230 lb)   BMI 40.74 kg/m   Estimated body mass index is 40.74 kg/m  as calculated from the following:    Height as of this encounter: 1.6 m (5' 3\").    Weight as of this encounter: 104.3 kg (230 lb). .    "

## 2024-03-22 NOTE — PROGRESS NOTES
Maddy is a 38 year old who is being evaluated via a billable telephone visit.    What phone number would you like to be contacted at? [phone]    How would you like to obtain your AVS? Maria Luisahart  Originating Location (pt. Location): Home    Distant Location (provider location):  On-site     Gynecology Consult Note      HPI: Maddy Ortiz is a 38 year old  who calls to discuss recent ultrasound in the setting of IUD strings not seen during exam and attempt for IUD removal.  She has no other new interval history.    ROS: 10 pt ROS neg other than HPI    PMH:   Past Medical History:   Diagnosis Date    Bipolar 1 disorder (H) 2012    Depressive disorder     Diabetes mellitus, type 2 (H) 2021    Paranoid type delusional disorder (H) 2012       PSHx:   Past Surgical History:   Procedure Laterality Date    TONSILLECTOMY & ADENOIDECTOMY      as a child    TONSILLECTOMY & ADENOIDECTOMY         Medications:   ACCU-CHEK GUIDE test strip, USE TO TEST BLOOD SUGAR 6 TIMES DAILY OR AS DIRECTED  blood glucose (ONETOUCH VERIO IQ) test strip, Use to test blood sugar 2 times daily or as directed.  Blood Glucose Monitoring Suppl (ONETOUCH VERIO FLEX SYSTEM) w/Device KIT, 1 each as needed  budesonide-formoterol (SYMBICORT) 80-4.5 MCG/ACT Inhaler, Inhale 2 puffs into the lungs 2 times daily  fluticasone (FLONASE) 50 MCG/ACT nasal spray, Spray 2 sprays into both nostrils daily  gabapentin (NEURONTIN) 300 MG capsule, Take 2 capsules (600 mg) by mouth At Bedtime  ibuprofen (ADVIL/MOTRIN) 200 MG tablet, Take 200 mg by mouth every 4 hours as needed for pain  insulin pen needle (BD PEN NEEDLE ROZ 2ND GEN) 32G X 4 MM miscellaneous, USE 1 PEN NEEDLES DAILY WITH VICTOZA  ketoconazole (NIZORAL) 2 % external cream, Apply topically 2 times daily X14 days  levocetirizine (XYZAL) 5 MG tablet, Take 1 tablet (5 mg) by mouth every evening  levonorgestrel (MIRENA) 20 MCG/DAY IUD, 1 each (20 mcg) by Intrauterine route  once  levothyroxine (SYNTHROID/LEVOTHROID) 50 MCG tablet, TAKE 1 TABLET (50 MCG) BY MOUTH DAILY BEFORE BREAKFAST  liraglutide (VICTOZA PEN) 18 MG/3ML solution, INJECT 1.8 MG UNDER THE SKIN ONCE DAILY  lithium ER (LITHOBID) 300 MG CR tablet, Take 300 mg by mouth daily  Melatonin 10 MG TABS tablet, Take 10 mg by mouth at bedtime  naltrexone (DEPADE/REVIA) 50 MG tablet, Take 0.5 tablets by mouth daily at 2 pm  OneTouch Delica Lancets 33G MISC, 1 each 2 times daily  propranolol (INDERAL) 10 MG tablet, Take 10 mg by mouth 3 times daily as needed  risperiDONE microspheres ER (RISPERDAL CONSTA) 50 MG injection, Next due 6/30  spacer ([facility]) holding chamber, Use with Symbicort and albuterol inhalers as prescribed  triamcinolone (KENALOG) 0.1 % external ointment, APPLY TOPICALLY 2 TIMES DAILY FOR UP TO 2 WEEKS  venlafaxine (EFFEXOR XR) 75 MG 24 hr capsule, 150 mg daily  vitamin D3 (CHOLECALCIFEROL) 50 mcg (2000 units) tablet, Take 1 tablet (50 mcg) by mouth daily  acetaminophen (TYLENOL) 325 MG tablet, Take 2 tablets (650 mg) by mouth every 4 hours as needed for mild pain (to moderate pain) (Patient not taking: Reported on 1/26/2024)  albuterol (PROAIR HFA/PROVENTIL HFA/VENTOLIN HFA) 108 (90 Base) MCG/ACT inhaler, Inhale 2 puffs into the lungs every 4 hours as needed for wheezing or shortness of breath (Patient not taking: Reported on 2/29/2024)  ammonium lactate (LAC-HYDRIN) 12 % external cream, Apply topically 2 times daily as needed for dry skin (Patient not taking: Reported on 2/29/2024)  azelastine (ASTELIN) 0.1 % nasal spray, Spray 2 sprays into both nostrils 2 times daily as needed for rhinitis (Patient not taking: Reported on 2/29/2024)  azelastine (OPTIVAR) 0.05 % ophthalmic solution, Apply 1 drop to eye 2 times daily as needed (itchy/watery/red eyes) (Patient not taking: Reported on 2/29/2024)  hydrocortisone 2.5 % ointment, Apply sparingly to affected area two times daily as needed but not more than  14 days in a row. Spare face, armpits, neck, and groin. (Patient not taking: Reported on 2/29/2024)  lamoTRIgine (LAMICTAL) 200 MG tablet, Take 200 mg by mouth at bedtime Also 25mg in the AM and 25mg at llunch time. (Patient not taking: Reported on 2/29/2024)  norelgestromin-ethinyl estradiol (ORTHO EVRA) 150-35 MCG/24HR patch, Remove old patch and apply new patch onto the skin once a week for 3 weeks (21 days). Do not wear patch week 4 (days 22-28), then repeat. (Patient not taking: Reported on 2/29/2024)  risperiDONE (RISPERDAL) 1 MG tablet, Take 1 mg by mouth at bedtime (Patient not taking: Reported on 2/29/2024)    No current facility-administered medications on file prior to visit.       Allergies:      Allergies   Allergen Reactions    Dust Mites Shortness Of Breath    Animal Dander      Other reaction(s): *Unknown    Mold      Other reaction(s): Runny Nose    Trees        Social History:   Social History     Socioeconomic History    Marital status: Single     Spouse name: Not on file    Number of children: Not on file    Years of education: Not on file    Highest education level: Not on file   Occupational History     Employer: CURRENTLY UNEMPLOYED AT THIS TIME   Tobacco Use    Smoking status: Former     Packs/day: .5     Types: Cigarettes    Smokeless tobacco: Former    Tobacco comments:     around 2nd hand smoke   Vaping Use    Vaping Use: Never used   Substance and Sexual Activity    Alcohol use: Not Currently    Drug use: Not Currently    Sexual activity: Not Currently     Partners: Male     Birth control/protection: Abstinence, None, I.U.D.   Other Topics Concern    Parent/sibling w/ CABG, MI or angioplasty before 65F 55M? No   Social History Narrative    One child    Education: some college        02/29/24        ENVIRONMENTAL HISTORY: [place] The home is heated with a electric furnace. They do not have central air conditioning, does have box air  "conditioner in the wall and has air purifier. The patient's bedroom is furnished with stuffed animals in bed, carpeting in bedroom and fabric window coverings. Pets inside the apartment includes 1 cat. There is history of cockroach or mice infestation. There are no smokers in the house.  The apartment does not have a basement.          Social Determinants of Health     Financial Resource Strain: Low Risk  (1/3/2024)    Financial Resource Strain     Within the past 12 months, have you or your family members you live with been unable to get utilities (heat, electricity) when it was really needed?: No   Food Insecurity: High Risk (1/3/2024)    Food Insecurity     Within the past 12 months, did you worry that your food would run out before you got money to buy more?: Yes     Within the past 12 months, did the food you bought just not last and you didn t have money to get more?: No   Transportation Needs: High Risk (1/3/2024)    Transportation Needs     Within the past 12 months, has lack of transportation kept you from medical appointments, getting your medicines, non-medical meetings or appointments, work, or from getting things that you need?: Yes   Physical Activity: Not on file   Stress: Not on file   Social Connections: Not on file   Interpersonal Safety: Not on file   Housing Stability: High Risk (1/3/2024)    Housing Stability     Do you have housing? : Yes     Are you worried about losing your housing?: Yes       Family History:  Family History   Adopted: Yes   Problem Relation Age of Onset    Unknown/Adopted Mother     Unknown/Adopted Father     Unknown/Adopted Other     Hypertension Sister        Physical Exam:   Vitals:    03/22/24 1119   Weight: 104.3 kg (230 lb)   Height: 1.6 m (5' 3\")      Gen: lying in bed, NAD  CV: Reg rate, well perfused  Pulm: no increased work of breathing  Abd: non-tender, non-distended, no masses   Pelvis: normal appearing external genitalia, vaginal mucosa, cervix, bimanual exam " with normal size and contour of uterus with no adnexal masses  Extremities: non-tender, no erythema; no edema  Psych: normal mood and affect  Neuro: no focal deficits    Labs:    Imaging:  Pelvic US  UTERUS: 8.7 x 5.0 x 4.4 cm. Normal in size and position with no  masses.     ENDOMETRIUM: 8 mm. IUD in satisfactory position.     RIGHT OVARY: 1.9 x 1.3 x 1.2 cm. Normal with flow demonstrated.     LEFT OVARY: 2.6 x 1.9 x 1.6 cm. Normal with flow demonstrated.     No significant free fluid.                                                                      IMPRESSION:  1.  Normal pelvic ultrasound.  2.  IUD in satisfactory position.     A&P: Maddy Ortiz  is a 38 year old  who calls to discuss ultrasound results.  Reviewed with patient that ultrasound is normal.  IUD is in good position.  Previously the patient had been interested in IUD removal.  Discussed with patient that given unable to feel strings were removed in clinic would recommend hysteroscopy should she desire removal and switch of contraceptive method.  Discussed that if is happy with IUD knowing that it is in good position can certainly leave in place.  At this point in time patient is unsure if she wishes to pursue removal further now or await for IUD to be due for exchange.  Discussed with patient she can message anytime if she would like to schedule hysteroscopy/removal with plan to switch to combination therapy per our previous discussion.  She states understanding, agreement with plan of care.    10 minutes on the phone with the patient and a total of 20 reviewing chart, obtaining history, counseling, coordinating care, documenting this encounter    Kathryn Shepard MD   3/22/2024 1:12 PM

## 2024-03-24 ENCOUNTER — HEALTH MAINTENANCE LETTER (OUTPATIENT)
Age: 39
End: 2024-03-24

## 2024-03-25 ENCOUNTER — TELEPHONE (OUTPATIENT)
Dept: SURGERY | Facility: CLINIC | Age: 39
End: 2024-03-25

## 2024-03-25 NOTE — TELEPHONE ENCOUNTER
Patient needs to be rescheduled for their virtual visit due to Reason for Reschedule: No-Show    Appointment mode: Video  Provider: Angeli Domingo RD    LVM x3 for check in, unable to reach patient, left clinic number if patient would like to reschedule

## 2024-03-27 ENCOUNTER — VIRTUAL VISIT (OUTPATIENT)
Dept: SURGERY | Facility: CLINIC | Age: 39
End: 2024-03-27
Payer: COMMERCIAL

## 2024-03-27 DIAGNOSIS — E11.9 TYPE 2 DIABETES MELLITUS WITHOUT COMPLICATION, WITHOUT LONG-TERM CURRENT USE OF INSULIN (H): Primary | ICD-10-CM

## 2024-03-27 DIAGNOSIS — E66.01 MORBID OBESITY WITH BMI OF 40.0-44.9, ADULT (H): ICD-10-CM

## 2024-03-27 PROCEDURE — 97803 MED NUTRITION INDIV SUBSEQ: CPT | Mod: 95 | Performed by: DIETITIAN, REGISTERED

## 2024-03-27 NOTE — LETTER
3/27/2024         RE: Maddy Ortiz  [address]  [facility] [id number]        Dear Colleague,    Thank you for referring your patient, Maddy Ortiz, to the [facility] [place]. Please see a copy of my visit note below.    Maddy Ortiz is a 38 year old who is being evaluated via a billable video visit.      Medical  Weight Loss Follow-Up Diet Evaluation  Assessment:  Maddy is presenting today for a follow up weight management nutrition consultation. Pt has had an initial appointment with Dr. Serna  Personal goals: lose weight to have umbilical hernia fixed - needs to get to 200 lbs  Weight loss medication:  victoza  Pt's weight is 227.6 lb   Initial weight: 202 lb  Weight change: down 26 lbs from high of 253 lbs        5/28/2022     4:35 AM   Changes and Difficulties   I have made the following changes to my diet since my last visit: meal planning   I have made the following changes to my activity/exercise since my last visit: active physical activity   With regards to my activity/exercise, I am still struggling with: rest - tend to work hard 1 week rest the next week     BMI: There is no height or weight on file to calculate BMI.  Ideal body weight: 52.4 kg (115 lb 8.3 oz)  Adjusted ideal body weight: 74.3 kg (163 lb 11.4 oz)    Estimated RMR (Steptoe-St Jeor equation):   1727 kcals x 1.2 (sedentary) = 2072 kcals (for weight maintenance)  Recommended Protein Intake: 60-80 grams of protein/day  Patient Active Problem List:  Patient Active Problem List   Diagnosis     Animal dander allergy     CARDIOVASCULAR SCREENING; LDL GOAL LESS THAN 160     Psychosis (H)     Paranoid type delusional disorder (H)     Bipolar 1 disorder (H)     Insomnia     Depression with anxiety     Seasonal allergic rhinitis due to pollen     Allergic rhinitis due to mold     Allergic rhinitis due to animal dander     Allergic rhinitis due to dust mite     Encounter for IUD  insertion     Schizoaffective disorder, bipolar type (H)     Gastroesophageal reflux disease without esophagitis     Paranoia (psychosis) (H)     Morbid obesity (H)     Schizoaffective disorder (H)     Umbilical hernia without obstruction and without gangrene     Fatty liver     Type 2 diabetes mellitus without complication, without long-term current use of insulin (H)     Elevated LFTs     Disorganized behavior     Infection due to 2019 novel coronavirus     Polypharmacy     Sedated due to multiple medications     Overdose, undetermined intent, initial encounter     Segmental and somatic dysfunction     SI (sacroiliac) joint dysfunction     mirena IUD 9/30/22     Neck pain     Hip pain, right     Chronic pain of both knees     Difficulty communicating     Diabetes:   Most recent A1c 4.9 in December  Progress on goals from last visit:  Sleep has been poor lately and has had a sporadic sleep schedule lately for this reason. She states she has been struggling with social depravation and isolation. She also states she has been having some issues with affording food. She has been buying fresh foods and making salads for her and her daughter.  Eat breakfast daily/3 meals per day - somewhat met  Keep food journal - not met  Increase water intake (buy plastic cups) - avoid soda - 27 oz water bottle x3   Continue going to the [facility] - not met    Dietary Recall:  Eating schedule has been somewhat sporadic due to sleeping schedule  Breakfast: 9 am   Lunch: 12-2 pm breakfast sandwich Mom's meal. (Not in the last couple days)   Dinner: Mom's meal  Beverages:   Water: adds flavoring, sometimes flavoring with caffeine  Lemonade: small amounts and watered down  Regular soda - rare, only if someone gives her some, but right now she can't afford it  Exercise:   She has been doing some Youtube video for exercise at home   Nutrition Diagnosis:    Obesity related to overeating and poor lifestyle habits as evidenced by patient's report of  limited budget, inconsistent meals, large portions, frequent snacking of sweets, lack of activity and BMI 39.15    Intervention:  Food and/or nutrient delivery: Prioritize protein and fiber foods at meals, continue to aim for 3 meals per day, avoid skipping meals  Nutrition counseling: discussed options to increase fruit and vegetable intake    Monitoring/Evaluation:    Goals:  Eat breakfast daily/3 meals per day  Continue to aim to have a fruit/veggie daily. Incorporate fresh, frozen, canned fruits and veggies with meals    Patient to follow up in 2 month(s) with RD and with bariatrician in 2 month(s)    Video-Visit Details    Type of service:  Video Visit    Video Start Time (time video started): 8:31 am    Video End Time (time video stopped): 8:53 am    Originating Location (pt. Location): Home        Distant Location (provider location):  On-site    Mode of Communication:  Video Conference via [employer]    Physician has received verbal consent for a Video Visit from the patient? Yes      Angeli Domingo      Again, thank you for allowing me to participate in the care of your patient.        Sincerely,        Angeli Domingo RD

## 2024-03-27 NOTE — PROGRESS NOTES
Maddy Ortiz is a 38 year old who is being evaluated via a billable video visit.      Medical  Weight Loss Follow-Up Diet Evaluation  Assessment:  Maddy is presenting today for a follow up weight management nutrition consultation. Pt has had an initial appointment with Dr. Serna  Personal goals: lose weight to have umbilical hernia fixed - needs to get to 200 lbs  Weight loss medication:  victoza  Pt's weight is 227.6 lb   Initial weight: 202 lb  Weight change: down 26 lbs from high of 253 lbs        5/28/2022     4:35 AM   Changes and Difficulties   I have made the following changes to my diet since my last visit: meal planning   I have made the following changes to my activity/exercise since my last visit: active physical activity   With regards to my activity/exercise, I am still struggling with: rest - tend to work hard 1 week rest the next week     BMI: There is no height or weight on file to calculate BMI.  Ideal body weight: 52.4 kg (115 lb 8.3 oz)  Adjusted ideal body weight: 74.3 kg (163 lb 11.4 oz)    Estimated RMR (McCreary-St Jeor equation):   1727 kcals x 1.2 (sedentary) = 2072 kcals (for weight maintenance)  Recommended Protein Intake: 60-80 grams of protein/day  Patient Active Problem List:  Patient Active Problem List   Diagnosis    Animal dander allergy    CARDIOVASCULAR SCREENING; LDL GOAL LESS THAN 160    Psychosis (H)    Paranoid type delusional disorder (H)    Bipolar 1 disorder (H)    Insomnia    Depression with anxiety    Seasonal allergic rhinitis due to pollen    Allergic rhinitis due to mold    Allergic rhinitis due to animal dander    Allergic rhinitis due to dust mite    Encounter for IUD insertion    Schizoaffective disorder, bipolar type (H)    Gastroesophageal reflux disease without esophagitis    Paranoia (psychosis) (H)    Morbid obesity (H)    Schizoaffective disorder (H)    Umbilical hernia without obstruction and without gangrene    Fatty liver    Type 2 diabetes mellitus  without complication, without long-term current use of insulin (H)    Elevated LFTs    Disorganized behavior    Infection due to 2019 novel coronavirus    Polypharmacy    Sedated due to multiple medications    Overdose, undetermined intent, initial encounter    Segmental and somatic dysfunction    SI (sacroiliac) joint dysfunction    mirena IUD 9/30/22    Neck pain    Hip pain, right    Chronic pain of both knees    Difficulty communicating     Diabetes:   Most recent A1c 4.9 in December  Progress on goals from last visit:  Sleep has been poor lately and has had a sporadic sleep schedule lately for this reason. She states she has been struggling with social depravation and isolation. She also states she has been having some issues with affording food. She has been buying fresh foods and making salads for her and her daughter.  Eat breakfast daily/3 meals per day - somewhat met  Keep food journal - not met  Increase water intake (buy plastic cups) - avoid soda - 27 oz water bottle x3   Continue going to the [facility] - not met    Dietary Recall:  Eating schedule has been somewhat sporadic due to sleeping schedule  Breakfast: 9 am   Lunch: 12-2 pm breakfast sandwich Mom's meal. (Not in the last couple days)   Dinner: Mom's meal  Beverages:   Water: adds flavoring, sometimes flavoring with caffeine  Lemonade: small amounts and watered down  Regular soda - rare, only if someone gives her some, but right now she can't afford it  Exercise:   She has been doing some Youtube video for exercise at home   Nutrition Diagnosis:    Obesity related to overeating and poor lifestyle habits as evidenced by patient's report of limited budget, inconsistent meals, large portions, frequent snacking of sweets, lack of activity and BMI 39.15    Intervention:  Food and/or nutrient delivery: Prioritize protein and fiber foods at meals, continue to aim for 3 meals per day, avoid skipping meals  Nutrition counseling: discussed options to increase  fruit and vegetable intake    Monitoring/Evaluation:    Goals:  Eat breakfast daily/3 meals per day  Continue to aim to have a fruit/veggie daily. Incorporate fresh, frozen, canned fruits and veggies with meals    Patient to follow up in 2 month(s) with RD and with bariatrician in 2 month(s)    Video-Visit Details    Type of service:  Video Visit    Video Start Time (time video started): 8:31 am    Video End Time (time video stopped): 8:53 am    Originating Location (pt. Location): Home        Distant Location (provider location):  On-site    Mode of Communication:  Video Conference via [facility]    Physician has received verbal consent for a Video Visit from the patient? Yes      Angeli Domingo

## 2024-04-01 ENCOUNTER — TRANSFERRED RECORDS (OUTPATIENT)
Dept: HEALTH INFORMATION MANAGEMENT | Facility: CLINIC | Age: 39
End: 2024-04-01
Payer: COMMERCIAL

## 2024-04-01 LAB — PHQ9 SCORE: 6

## 2024-04-09 ENCOUNTER — VIRTUAL VISIT (OUTPATIENT)
Dept: FAMILY MEDICINE | Facility: CLINIC | Age: 39
End: 2024-04-09
Payer: COMMERCIAL

## 2024-04-09 ENCOUNTER — TRANSFERRED RECORDS (OUTPATIENT)
Dept: HEALTH INFORMATION MANAGEMENT | Facility: CLINIC | Age: 39
End: 2024-04-09

## 2024-04-09 DIAGNOSIS — Z30.09 CONTRACEPTIVE EDUCATION: ICD-10-CM

## 2024-04-09 DIAGNOSIS — G47.9 SLEEP DISTURBANCE: ICD-10-CM

## 2024-04-09 DIAGNOSIS — J06.9 UPPER RESPIRATORY TRACT INFECTION, UNSPECIFIED TYPE: Primary | ICD-10-CM

## 2024-04-09 PROCEDURE — 99214 OFFICE O/P EST MOD 30 MIN: CPT | Mod: 95 | Performed by: NURSE PRACTITIONER

## 2024-04-09 ASSESSMENT — PATIENT HEALTH QUESTIONNAIRE - PHQ9
SUM OF ALL RESPONSES TO PHQ QUESTIONS 1-9: 9
10. IF YOU CHECKED OFF ANY PROBLEMS, HOW DIFFICULT HAVE THESE PROBLEMS MADE IT FOR YOU TO DO YOUR WORK, TAKE CARE OF THINGS AT HOME, OR GET ALONG WITH OTHER PEOPLE: VERY DIFFICULT
SUM OF ALL RESPONSES TO PHQ QUESTIONS 1-9: 9

## 2024-04-09 NOTE — PROGRESS NOTES
Maddy is a 38 year old who is being evaluated via a billable video visit.    How would you like to obtain your AVS? MyChart  If the video visit is dropped, the invitation should be resent by: Text to cell phone: [phone]  Will anyone else be joining your video visit? No          Assessment & Plan     Upper respiratory tract infection, unspecified type  1. Drink plenty of fluids.  2. May use tylenol 1000 mg every 8 hours or ibuprofen 600 mg every 6 hours for any discomfort you are having.  3. Use Neti pot or nasal saline if you are having nasal or sinus congestion  4. For cough, dextromethorphan/guaifenesin combinations help loosen secretions and suppress cough safely without significant risk of sedation.   5. For nasal congestion and sinus pressure, pseudoephedrine (Sudafed) or phenylephrine is often helpful but it can cause elevations in blood pressure and insomnia.  Use Coricidin as a decongestant if you have a history of hypertension.  6. For runny nose and nasal congestion, use over-the-counter oxymetazoline (i.e. Afrin - use 2 sprays of 0.05% in each nostril every 12 hours; stop after 3-5 days)   7. Suggest humidifier in room at night  8. Call clinic if no improvement in 1 week      Sleep disturbance  Patient frequently complains of being overly fatigued and sleeping prolonged periods at night.  She is also on several medications that can have sedating side effects.  Discussed with patient that I did not feel it was appropriate to start her on a sleeping medication at this time.  She should continue to work with her psychiatrist.    Contraceptive education  Discussed IUD and OB/GYN consult.  If she is happy with her IUD and does not want it taken out, she may continue for the full 8 years.  There is no need to start the Ortho Evra patch at this time.  Patient not currently sexually active, however is on several medications that are contraindicated in pregnancy and therefore recommend ongoing  contraception.  Patient is in agreement.    The risks, benefits and treatment options of prescribed medications or other treatments have been discussed with the patient. The patient verbalized their understanding and should call or follow up if no improvement or if they develop further problems.  OLGA Luo   Maddy is a 38 year old, presenting for the following health issues:  Fatigue (Pt reports feeling really tired over the weekend. Pt slept 16 hours yesterday. )        4/9/2024    11:19 AM   Additional Questions   Roomed by Gladis LEVINE   Accompanied by self         4/9/2024    11:19 AM   Patient Reported Additional Medications   Patient reports taking the following new medications no new meds     Video Start Time: 11:40 AM    HPI     Discuss the ortho evra patch, wondering if should be taking.   Has the IUD in - working well, willing to keep it in.  Not sexually active.      Acute Illness  Acute illness concerns: sore throat and tiredness, pt reports feeling very tired   Onset/Duration: Thursday   Symptoms:  Fever: No  Chills/Sweats: YES  Headache (location?): No  Sinus Pressure: No  Conjunctivitis:  No  Ear Pain: no  Rhinorrhea: No  Congestion: No  Sore Throat: YES  Cough: no  Wheeze: No  Decreased Appetite: No  Nausea: No  Vomiting: No  Diarrhea: No  Dysuria/Freq.: No  Dysuria or Hematuria: YES- been on going the past couple days   Fatigue/Achiness: YES- slept about 16 hours   Sick/Strep Exposure: No  Therapies tried and outcome: None      Sleep issues:  Patient reports that sometimes she sleeps too much.  Sometimes its hard to fall asleep  She is wondering if a sleeping pill would get her into a more regular sleep schedule.  Plans to discuss with her psychiatrist at upcoming appointment.      Review of Systems  Constitutional, HEENT, cardiovascular, pulmonary, gi and gu systems are negative, except as otherwise noted.          Objective           Vitals:  No vitals were obtained  today due to virtual visit.    Physical Exam   GENERAL: alert and no distress  EYES: Eyes grossly normal to inspection.  No discharge or erythema, or obvious scleral/conjunctival abnormalities.  RESP: No audible wheeze, cough, or visible cyanosis.    SKIN: Visible skin clear. No significant rash, abnormal pigmentation or lesions.  NEURO: Cranial nerves grossly intact.  Mentation and speech appropriate for age.  PSYCH: Appropriate affect, tone, and pace of words          Video-Visit Details    Type of service:  Video Visit   Video End Time:11:53 AM  Originating Location (pt. Location): Home    Distant Location (provider location):  On-site  Platform used for Video Visit: Perri  Signed Electronically by: PHILL Luo CNP

## 2024-04-10 ENCOUNTER — TRANSFERRED RECORDS (OUTPATIENT)
Dept: HEALTH INFORMATION MANAGEMENT | Facility: CLINIC | Age: 39
End: 2024-04-10
Payer: COMMERCIAL

## 2024-04-10 LAB — PHQ9 SCORE: 9

## 2024-04-12 ENCOUNTER — THERAPY VISIT (OUTPATIENT)
Dept: SPEECH THERAPY | Facility: CLINIC | Age: 39
End: 2024-04-12
Attending: NURSE PRACTITIONER
Payer: COMMERCIAL

## 2024-04-12 DIAGNOSIS — F80.9: Primary | ICD-10-CM

## 2024-04-12 PROBLEM — R47.02 DIFFICULTY USING PRAGMATICS IN COMMUNICATION: Status: ACTIVE | Noted: 2024-04-12

## 2024-04-12 PROCEDURE — 92507 TX SP LANG VOICE COMM INDIV: CPT | Mod: GN | Performed by: SPEECH-LANGUAGE PATHOLOGIST

## 2024-04-12 NOTE — PROGRESS NOTES
[facility]                                                                                   OUTPATIENT SPEECH LANGUAGE PATHOLOGY    PLAN OF TREATMENT FOR OUTPATIENT REHABILITATION   Patient's Last Name, First Name, Maddy Guardado YOB: 1985   Provider's Name   [facility]   Medical Record No.  [id number]     Onset Date:  11/20/23 Start of Care Date:       Medical Diagnosis:   Difficulty communicating      SLP Treatment Diagnosis:   Difficulty communicating Plan of Treatment  Frequency/Duration:  1x/2 weeks   /   6 weeks    Certification date from  3/5/24   To    4/13/24       See note for plan of treatment details and functional goals     Nadia Calle SLP                         I CERTIFY THE NEED FOR THESE SERVICES FURNISHED UNDER        THIS PLAN OF TREATMENT AND WHILE UNDER MY CARE     (Physician attestation of this document indicates review and certification of the therapy plan).              Referring Provider:  Shirley Freeman    Initial Assessment  See Epic Evaluation-      PLAN  Continue therapy per current plan of care.  Pt has cancelled several visits due to not feeling well/tired.    Beginning/End Dates of Progress Note Reporting Period:    12/21/24 to 03/04/2024    Referring Provider:  Shirley Freeman    Speech Therapy Progress Note  03/04/24    Appointment Info   Treating Provider Nadia Calle MA, CCC/SLP   Visits Used 3   Medical Diagnosis Difficulty communicating   SLP Tx Diagnosis Difficulty communicating   Virtual Visit   Time of service begin: 1101   Time of service end: 1137   Provider Location (Distant Site) Office   Patient Location (Originating Site) Home/other residence   Virtual Visit Rationale The virtual visit will provide the care the patient needs. We reviewed the patient's chart, and PTRx prescription to determine the following telemedicine visit is appropriate and effective for the patient's care.    Progress Note/Certification   Start Of Care Date 11/20/23   Onset Of Illness/injury Or Date Of Surgery 10/16/23   Therapy Frequency 1x/wk   Predicted Duration 12 weeks   Certification date from 12/21/23   Certification date to 03/15/24   Progress Note Due Date 03/15/24   Subjective Report   Subjective Report Pt seen via virtual visit. SLP called pt when she didn't log on and pt stated she was sleeping but was able to participate.  Pt reports writing is her strength and would like to use that to help with social skills if able. Pt states she is feeling her mental health needs are being met at this time when asked by SLP.   SLP Goal 1   Goal Identifier pragmatic skills   Goal Description Pt will verbalize understanding of 3 pragmatic skills used in conversation to use as target focus in conversation. (ie eye contact, greetings, turn taking)   Rationale To maximize functional communication within the home or community   Goal Progress Pt verbalizes understanding but difficult to implement.   Target Date 03/13/24   SLP Goal 2   Goal Identifier id/use strategies   Goal Description Pt will develop target strategy with SLP and IHS worker for use in community and social setting.   Rationale To maximize functional communication within the home or community   Goal Progress Pt does not ID strategies but willing to work on specific strategies after education during session.   Target Date 03/13/24   SLP Goal 3   Goal Identifier home program   Goal Description Pt will utilize home program for generalization of strategies with communication partners across 3 visits.   Rationale To maximize functional communication within the home or community   Goal Progress greetings with community member today when on outing with PCA.   Target Date 03/13/24   Treatment Speech/Lang/Voice   Treatment of Speech, Language, Voice Communication&/or Auditory Processing (44023) 36 Minutes   Education   Learner/Method Patient   Education Comments home  program   Total Session Time   Total Treatment Time (sum of timed and untimed services) 36

## 2024-04-12 NOTE — PROGRESS NOTES
DISCHARGE  Reason for Discharge: Patient chooses to discontinue therapy.  Pt has made partial progress with goals.  Pt states she feels comfortable to continue home program at this time.    Discharge Plan: Patient to continue home program.    Referring Provider:  Shirley Freeman     Speech Therapy Discharge Note  04/12/24    Appointment Info   Treating Provider Nadia Calle MA, CCC/SLP   Visits Used 4   Medical Diagnosis Difficulty communicating   SLP Tx Diagnosis Difficulty communicating   Progress Note/Certification   Start Of Care Date 11/20/23   Onset Of Illness/injury Or Date Of Surgery 10/16/23   Therapy Frequency 1x/wk   Predicted Duration 12 weeks   Certification date from 03/05/24   Certification date to 04/13/24   Progress Note Due Date 04/12/24   Progress Note Completed Date 03/04/24   Subjective Report   Subjective Report Pt attended session in person today.  Pt reprorts she has been practicing home program, expecially in conversations with others with asking more questions to engage in more turn taking with communication.   SLP Goal 1   Goal Identifier pragmatic skills   Goal Description Pt will verbalize understanding of 3 pragmatic skills used in conversation to use as target focus in conversation. (ie eye contact, greetings, turn taking)   Rationale To maximize functional communication within the home or community   Goal Progress Pt reports strategies for turn taking.  With SLP cues able to discuss eye contact and closing greeting to wrap up conversation.   Target Date 03/13/24   Date Met 04/12/24   SLP Goal 2   Goal Identifier id/use strategies   Goal Description Pt will develop target strategy with SLP and IHS worker for use in community and social setting.   Rationale To maximize functional communication within the home or community   Goal Progress Pt feels she has been trying strategies and will continue home program.   Target Date 03/13/24   Date Met 04/12/24   SLP Goal 3   Goal Identifier  home program   Goal Description Pt will utilize home program for generalization of strategies with communication partners across 3 visits.   Rationale To maximize functional communication within the home or community   Goal Progress greetings with community member today when on outing with PCA.   Target Date 03/13/24   Date Met 04/12/24   Treatment Speech/Lang/Voice   Treatment of Speech, Language, Voice Communication&/or Auditory Processing (30279) 35 Minutes   Skilled Intervention Provided written and verbal information on.;Provided feedback on performance of tasks   Patient Response/Progress Pt has made some gains with social communication skills.  She has joined an on-line book club and will also try strategies in virtual setting.  Pt reports nervousness with conversations.  She verbalized understanding of socially ending conversations in an appropriate way and will continue practice with turn taking.  Pt states feels ready for discharge and to continue home program.   Education   Learner/Method Patient   Education Comments home program   Total Session Time   Total Treatment Time (sum of timed and untimed services) 35

## 2024-04-25 ENCOUNTER — TRANSFERRED RECORDS (OUTPATIENT)
Dept: HEALTH INFORMATION MANAGEMENT | Facility: CLINIC | Age: 39
End: 2024-04-25
Payer: COMMERCIAL

## 2024-04-25 LAB — PHQ9 SCORE: 2

## 2024-05-01 ENCOUNTER — TRANSFERRED RECORDS (OUTPATIENT)
Dept: HEALTH INFORMATION MANAGEMENT | Facility: CLINIC | Age: 39
End: 2024-05-01
Payer: COMMERCIAL

## 2024-05-07 DIAGNOSIS — H10.13 ALLERGIC CONJUNCTIVITIS OF BOTH EYES: ICD-10-CM

## 2024-05-07 RX ORDER — AZELASTINE 1 MG/ML
2 SPRAY, METERED NASAL 2 TIMES DAILY PRN
Qty: 30 ML | Refills: 3 | Status: SHIPPED | OUTPATIENT
Start: 2024-05-07 | End: 2024-06-10

## 2024-05-07 NOTE — TELEPHONE ENCOUNTER
Prescription approved per [facility] Refill Protocol.    Callie VELASQUEZ RN  Specialty/Allergy Clinics

## 2024-05-07 NOTE — TELEPHONE ENCOUNTER
Requested Prescriptions   Pending Prescriptions Disp Refills    azelastine (ASTELIN) 0.1 % nasal spray 30 mL 3     Sig: Spray 2 sprays into both nostrils 2 times daily as needed for rhinitis       There is no refill protocol information for this order          Last office visit: 1/26/2024 ; last virtual visit: 2/29/2024 with prescribing provider:  Rudy Shin      Future Office Visit:      Lisa Moran   [facility] [place]   [facility]  [phone]

## 2024-05-09 ENCOUNTER — MYC MEDICAL ADVICE (OUTPATIENT)
Dept: FAMILY MEDICINE | Facility: CLINIC | Age: 39
End: 2024-05-09
Payer: COMMERCIAL

## 2024-05-28 NOTE — PROGRESS NOTES
Maddy Ortiz is a 38 year old who is being evaluated via a billable video visit.      How would you like to obtain your AVS? MyChart  If the video visit is dropped, the invitation should be resent by: Text to cell phone: [phone]  Will anyone else be joining your video visit? No        Medical  Weight Loss Follow-Up Diet Evaluation  Assessment:  Maddy is presenting today for a follow up weight management nutrition consultation.  This patient has had an initial appointment and was referred by Dr. Serna for MNT as treatment for Obesity  Weight loss medication: Victoza  Pt's weight is 220 lbs  Initial weight: 202 lbs  Weight change: 18 lbs weight gain since initial - 10 lbs weight loss since last RD visit        5/28/2022     4:35 AM   Changes and Difficulties   I have made the following changes to my diet since my last visit: meal planning   I have made the following changes to my activity/exercise since my last visit: active physical activity   With regards to my activity/exercise, I am still struggling with: rest - tend to work hard 1 week rest the next week     BMI: 38.97  Ideal body weight: 52.4 kg (115 lb 8.3 oz)  Adjusted ideal body weight: 73.2 kg (161 lb 5 oz)    Estimated RMR ([place]-St Jeor equation):   1727 kcals x 1.2 (sedentary) = 2072 kcals (for weight maintenance)  Recommended Protein Intake: 60-80 grams of protein/day  Patient Active Problem List:  Patient Active Problem List   Diagnosis    Animal dander allergy    CARDIOVASCULAR SCREENING; LDL GOAL LESS THAN 160    Psychosis (H)    Paranoid type delusional disorder (H)    Bipolar 1 disorder (H)    Insomnia    Depression with anxiety    Seasonal allergic rhinitis due to pollen    Allergic rhinitis due to mold    Allergic rhinitis due to animal dander    Allergic rhinitis due to dust mite    Encounter for IUD insertion    Schizoaffective disorder, bipolar type (H)    Gastroesophageal reflux disease without esophagitis    Paranoia (psychosis) (H)     Morbid obesity (H)    Schizoaffective disorder (H)    Umbilical hernia without obstruction and without gangrene    Fatty liver    Type 2 diabetes mellitus without complication, without long-term current use of insulin (H)    Elevated LFTs    Disorganized behavior    Infection due to 2019 novel coronavirus    Polypharmacy    Sedated due to multiple medications    Overdose, undetermined intent, initial encounter    Segmental and somatic dysfunction    SI (sacroiliac) joint dysfunction    mirena IUD 9/30/22    Neck pain    Hip pain, right    Chronic pain of both knees    Difficulty communicating    Difficulty using pragmatics in communication     Diabetes: No - A1C 4.9% 12/9/2023    Progress on goals from last visit: Patient reported that she was going well with vegetables - trying to add extra into recipes. Patient has been trying to cook for herself rather than Mom's Meals. Commonly having two meals per day. Patient stated that she feels guilty eating three meals per day d/t limited intake. Discussed food shelf/pantry options - patient expressed that they have been having financial issues and have a limited food budget.     Goals (3/27/2024):  Eat breakfast daily/3 meals per day - not met  Continue to aim to have a fruit/veggie daily. Incorporate fresh, frozen, canned fruits and veggies with meals - met    Dietary Recall:  Breakfast: snacks or skips  Lunch: snacks or skips  Dinner: taco bowls  Typical snacks: cookies, meat, bread (made her own)  Beverages:   Hi-C  Water with SF flavoring  Exercise:   Trying to walk daily    Nutrition Diagnosis:    Obesity related to overeating and poor lifestyle habits as evidenced by patient's report of limited budget, inconsistent meals, large portions, frequent snacking of sweets, lack of activity and BMI 39.1       Intervention:  Food and/or nutrient delivery: continuing to have fiber sources with meals. Prioritizing protein with meals. Aiming for three meals per day    Coordination of nutrition care: provided food shelf locations    Monitoring/Evaluation:    Goals:  Eat breakfast daily/3 meals per day  Continue to aim to have a fruit/veggie daily. Incorporate fresh, frozen, canned fruits and veggies with meals   Stay consistent with activity - walking     Patient to follow up in 0.5 month(s) with bariatrician and 2 month(s) with RD      Video-Visit Details    Type of service:  Video Visit    Video Start Time (time video started): 11:34 AM    Video End Time (time video stopped): 11:54 AM    Originating Location (pt. Location): Home      Distant Location (provider location):  Off-site    Mode of Communication:  Video Conference via [facility]    Physician has received verbal consent for a Video Visit from the patient? Yes      Karly Baca RD

## 2024-05-29 ENCOUNTER — VIRTUAL VISIT (OUTPATIENT)
Dept: SURGERY | Facility: CLINIC | Age: 39
End: 2024-05-29
Payer: COMMERCIAL

## 2024-05-29 DIAGNOSIS — E11.9 TYPE 2 DIABETES MELLITUS WITHOUT COMPLICATION, WITHOUT LONG-TERM CURRENT USE OF INSULIN (H): Primary | ICD-10-CM

## 2024-05-29 DIAGNOSIS — E66.9 OBESITY (BMI 30-39.9): ICD-10-CM

## 2024-05-29 PROCEDURE — 97803 MED NUTRITION INDIV SUBSEQ: CPT | Mod: 95 | Performed by: DIETITIAN, REGISTERED

## 2024-05-29 NOTE — LETTER
5/29/2024         RE: Maddy Ortiz  [address]  [place] [address]        Dear Colleague,    Thank you for referring your patient, Maddy Ortzi, to the [facility] [place]. Please see a copy of my visit note below.    Maddy Ortiz is a 38 year old who is being evaluated via a billable video visit.      How would you like to obtain your AVS? MyChart  If the video visit is dropped, the invitation should be resent by: Text to cell phone: [phone]  Will anyone else be joining your video visit? No        Medical  Weight Loss Follow-Up Diet Evaluation  Assessment:  Maddy is presenting today for a follow up weight management nutrition consultation.  This patient has had an initial appointment and was referred by Dr. Serna for MNT as treatment for Obesity  Weight loss medication: Victoza  Pt's weight is 220 lbs  Initial weight: 202 lbs  Weight change: 18 lbs weight gain since initial - 10 lbs weight loss since last RD visit        5/28/2022     4:35 AM   Changes and Difficulties   I have made the following changes to my diet since my last visit: meal planning   I have made the following changes to my activity/exercise since my last visit: active physical activity   With regards to my activity/exercise, I am still struggling with: rest - tend to work hard 1 week rest the next week     BMI: 38.97  Ideal body weight: 52.4 kg (115 lb 8.3 oz)  Adjusted ideal body weight: 73.2 kg (161 lb 5 oz)    Estimated RMR ([place]-St Jeor equation):   1727 kcals x 1.2 (sedentary) = 2072 kcals (for weight maintenance)  Recommended Protein Intake: 60-80 grams of protein/day  Patient Active Problem List:  Patient Active Problem List   Diagnosis     Animal dander allergy     CARDIOVASCULAR SCREENING; LDL GOAL LESS THAN 160     Psychosis (H)     Paranoid type delusional disorder (H)     Bipolar 1 disorder (H)     Insomnia     Depression with anxiety     Seasonal allergic  rhinitis due to pollen     Allergic rhinitis due to mold     Allergic rhinitis due to animal dander     Allergic rhinitis due to dust mite     Encounter for IUD insertion     Schizoaffective disorder, bipolar type (H)     Gastroesophageal reflux disease without esophagitis     Paranoia (psychosis) (H)     Morbid obesity (H)     Schizoaffective disorder (H)     Umbilical hernia without obstruction and without gangrene     Fatty liver     Type 2 diabetes mellitus without complication, without long-term current use of insulin (H)     Elevated LFTs     Disorganized behavior     Infection due to 2019 novel coronavirus     Polypharmacy     Sedated due to multiple medications     Overdose, undetermined intent, initial encounter     Segmental and somatic dysfunction     SI (sacroiliac) joint dysfunction     mirena IUD 9/30/22     Neck pain     Hip pain, right     Chronic pain of both knees     Difficulty communicating     Difficulty using pragmatics in communication     Diabetes: No - A1C 4.9% 12/9/2023    Progress on goals from last visit: Patient reported that she was going well with vegetables - trying to add extra into recipes. Patient has been trying to cook for herself rather than Mom's Meals. Commonly having two meals per day. Patient stated that she feels guilty eating three meals per day d/t limited intake. Discussed food shelf/pantry options - patient expressed that they have been having financial issues and have a limited food budget.     Goals (3/27/2024):  Eat breakfast daily/3 meals per day - not met  Continue to aim to have a fruit/veggie daily. Incorporate fresh, frozen, canned fruits and veggies with meals - met    Dietary Recall:  Breakfast: snacks or skips  Lunch: snacks or skips  Dinner: taco bowls  Typical snacks: cookies, meat, bread (made her own)  Beverages:   Hi-C  Water with SF flavoring  Exercise:   Trying to walk daily    Nutrition Diagnosis:    Obesity related to overeating and poor lifestyle  habits as evidenced by patient's report of limited budget, inconsistent meals, large portions, frequent snacking of sweets, lack of activity and BMI 39.1       Intervention:  Food and/or nutrient delivery: continuing to have fiber sources with meals. Prioritizing protein with meals. Aiming for three meals per day   Coordination of nutrition care: provided food shelf locations    Monitoring/Evaluation:    Goals:  Eat breakfast daily/3 meals per day  Continue to aim to have a fruit/veggie daily. Incorporate fresh, frozen, canned fruits and veggies with meals   Stay consistent with activity - walking     Patient to follow up in 0.5 month(s) with bariatrician and 2 month(s) with RD      Video-Visit Details    Type of service:  Video Visit    Video Start Time (time video started): 11:34 AM    Video End Time (time video stopped): 11:54 AM    Originating Location (pt. Location): Home      Distant Location (provider location):  Off-site    Mode of Communication:  Video Conference via [facility]    Physician has received verbal consent for a Video Visit from the patient? Yes      Karly Baca RD          Again, thank you for allowing me to participate in the care of your patient.        Sincerely,        Karly Baca RD

## 2024-06-03 ENCOUNTER — TRANSFERRED RECORDS (OUTPATIENT)
Dept: HEALTH INFORMATION MANAGEMENT | Facility: CLINIC | Age: 39
End: 2024-06-03
Payer: COMMERCIAL

## 2024-06-06 ENCOUNTER — MYC MEDICAL ADVICE (OUTPATIENT)
Dept: ALLERGY | Facility: CLINIC | Age: 39
End: 2024-06-06
Payer: COMMERCIAL

## 2024-06-06 NOTE — TELEPHONE ENCOUNTER
Maddy,  Do you see a neurologist for your migraines? Nasal congestion is definitely a sign of allergies; however, migraines can be completely unrelated to environmental and seasonal allergies.    Have you been using Flonase? The reason I ask is because, while Flonase can help with allergies, it can also contribute to migraines in some cases. It's not always the case, but it's something to consider. Can you try stopping it for a couple of days and see if that makes a difference?    Rudy Shin

## 2024-06-07 ENCOUNTER — TELEPHONE (OUTPATIENT)
Dept: FAMILY MEDICINE | Facility: CLINIC | Age: 39
End: 2024-06-07
Payer: COMMERCIAL

## 2024-06-07 ENCOUNTER — MYC MEDICAL ADVICE (OUTPATIENT)
Dept: FAMILY MEDICINE | Facility: CLINIC | Age: 39
End: 2024-06-07
Payer: COMMERCIAL

## 2024-06-07 DIAGNOSIS — E11.9 TYPE 2 DIABETES MELLITUS WITHOUT COMPLICATION, WITHOUT LONG-TERM CURRENT USE OF INSULIN (H): Primary | ICD-10-CM

## 2024-06-07 NOTE — TELEPHONE ENCOUNTER
Patient is due for a diabetes follow up appointment with me.      Non-fasting labs due:  A1C and Microalbumin    Orders were placed, please have lab work done before visit.      Please ask patient to bring in their glucometer so we can download the data.    Please call patient to schedule.    Patient also due for:    Annual Medicare Wellness    Shirley Freeman, CNP

## 2024-06-07 NOTE — TELEPHONE ENCOUNTER
Patient is scheduled 6/28/24 for labs and DM follow up     Patient Quality Outreach    Patient is due for the following:   Diabetes -  Diabetic Follow-Up Visit    Next Steps:   Patient was scheduled for Patient is scheduled 6/28/24 for labs and DM follow up     Type of outreach:    Sent letter.    Next Steps:  Reach out within 90 days via Letter.    Max number of attempts reached: Yes. Will try again in 90 days if patient still on fail list.    Questions for provider review:    None           Eli Felton MA  Chart routed to .

## 2024-06-08 ENCOUNTER — HOSPITAL ENCOUNTER (EMERGENCY)
Facility: CLINIC | Age: 39
Discharge: HOME | End: 2024-06-08
Attending: EMERGENCY MEDICINE | Admitting: EMERGENCY MEDICINE
Payer: COMMERCIAL

## 2024-06-08 VITALS
SYSTOLIC BLOOD PRESSURE: 136 MMHG | DIASTOLIC BLOOD PRESSURE: 94 MMHG | WEIGHT: 220 LBS | RESPIRATION RATE: 22 BRPM | HEART RATE: 105 BPM | TEMPERATURE: 98.2 F | OXYGEN SATURATION: 95 % | HEIGHT: 63 IN | BODY MASS INDEX: 38.98 KG/M2

## 2024-06-08 DIAGNOSIS — F25.0 SCHIZOAFFECTIVE DISORDER, BIPOLAR TYPE (H): ICD-10-CM

## 2024-06-08 LAB
ALBUMIN SERPL BCG-MCNC: 4.5 G/DL (ref 3.5–5.2)
ALP SERPL-CCNC: 89 U/L (ref 40–150)
ALT SERPL W P-5'-P-CCNC: 36 U/L (ref 0–50)
ANION GAP SERPL CALCULATED.3IONS-SCNC: 12 MMOL/L (ref 7–15)
AST SERPL W P-5'-P-CCNC: 27 U/L (ref 0–45)
BASOPHILS # BLD AUTO: 0.1 10E3/UL (ref 0–0.2)
BASOPHILS NFR BLD AUTO: 1 %
BILIRUB SERPL-MCNC: 0.3 MG/DL
BUN SERPL-MCNC: 14.3 MG/DL (ref 6–20)
CALCIUM SERPL-MCNC: 9.8 MG/DL (ref 8.6–10)
CHLORIDE SERPL-SCNC: 103 MMOL/L (ref 98–107)
CREAT SERPL-MCNC: 0.91 MG/DL (ref 0.51–0.95)
DEPRECATED HCO3 PLAS-SCNC: 24 MMOL/L (ref 22–29)
EGFRCR SERPLBLD CKD-EPI 2021: 82 ML/MIN/1.73M2
EOSINOPHIL # BLD AUTO: 0.3 10E3/UL (ref 0–0.7)
EOSINOPHIL NFR BLD AUTO: 4 %
ERYTHROCYTE [DISTWIDTH] IN BLOOD BY AUTOMATED COUNT: 11.9 % (ref 10–15)
GLUCOSE SERPL-MCNC: 75 MG/DL (ref 70–99)
HCG SERPL QL: NEGATIVE
HCT VFR BLD AUTO: 43.7 % (ref 35–47)
HGB BLD-MCNC: 14.7 G/DL (ref 11.7–15.7)
IMM GRANULOCYTES # BLD: 0 10E3/UL
IMM GRANULOCYTES NFR BLD: 0 %
LITHIUM SERPL-SCNC: 0.75 MMOL/L (ref 0.6–1.2)
LYMPHOCYTES # BLD AUTO: 2.2 10E3/UL (ref 0.8–5.3)
LYMPHOCYTES NFR BLD AUTO: 25 %
MCH RBC QN AUTO: 29.3 PG (ref 26.5–33)
MCHC RBC AUTO-ENTMCNC: 33.6 G/DL (ref 31.5–36.5)
MCV RBC AUTO: 87 FL (ref 78–100)
MONOCYTES # BLD AUTO: 0.7 10E3/UL (ref 0–1.3)
MONOCYTES NFR BLD AUTO: 8 %
NEUTROPHILS # BLD AUTO: 5.6 10E3/UL (ref 1.6–8.3)
NEUTROPHILS NFR BLD AUTO: 63 %
NRBC # BLD AUTO: 0 10E3/UL
NRBC BLD AUTO-RTO: 0 /100
PLATELET # BLD AUTO: 183 10E3/UL (ref 150–450)
POTASSIUM SERPL-SCNC: 3.8 MMOL/L (ref 3.4–5.3)
PROT SERPL-MCNC: 7.4 G/DL (ref 6.4–8.3)
RBC # BLD AUTO: 5.02 10E6/UL (ref 3.8–5.2)
SODIUM SERPL-SCNC: 139 MMOL/L (ref 135–145)
WBC # BLD AUTO: 9 10E3/UL (ref 4–11)

## 2024-06-08 PROCEDURE — 99284 EMERGENCY DEPT VISIT MOD MDM: CPT | Performed by: EMERGENCY MEDICINE

## 2024-06-08 PROCEDURE — 80178 ASSAY OF LITHIUM: CPT | Performed by: EMERGENCY MEDICINE

## 2024-06-08 PROCEDURE — 84703 CHORIONIC GONADOTROPIN ASSAY: CPT | Performed by: EMERGENCY MEDICINE

## 2024-06-08 PROCEDURE — 80053 COMPREHEN METABOLIC PANEL: CPT | Performed by: EMERGENCY MEDICINE

## 2024-06-08 PROCEDURE — 85004 AUTOMATED DIFF WBC COUNT: CPT | Performed by: EMERGENCY MEDICINE

## 2024-06-08 PROCEDURE — 36415 COLL VENOUS BLD VENIPUNCTURE: CPT | Performed by: EMERGENCY MEDICINE

## 2024-06-08 ASSESSMENT — ACTIVITIES OF DAILY LIVING (ADL)
ADLS_ACUITY_SCORE: 35

## 2024-06-08 ASSESSMENT — COLUMBIA-SUICIDE SEVERITY RATING SCALE - C-SSRS
2. HAVE YOU ACTUALLY HAD ANY THOUGHTS OF KILLING YOURSELF IN THE PAST MONTH?: NO
6. HAVE YOU EVER DONE ANYTHING, STARTED TO DO ANYTHING, OR PREPARED TO DO ANYTHING TO END YOUR LIFE?: YES
1. IN THE PAST MONTH, HAVE YOU WISHED YOU WERE DEAD OR WISHED YOU COULD GO TO SLEEP AND NOT WAKE UP?: NO

## 2024-06-08 NOTE — ED TRIAGE NOTES
"Patient sent from  for evaluation. Patient reports she wrote a message to her brother and he replied he was going to block her, which made her think she her thoughts may not be normal. Patient has been taking all pysch medications as prescribed. Patient withdrawn, does not make eye contact, rambling in triage. Denies SI but reports she may have had suicidal behaviors over the last month but isn't sure because she \"isn't good at human behaviors.\".     Triage Assessment (Adult)       Row Name 06/08/24 1600          Triage Assessment    Airway WDL WDL        Respiratory WDL    Respiratory WDL WDL        Cardiac WDL    Cardiac WDL WDL        Peripheral/Neurovascular WDL    Peripheral Neurovascular WDL WDL        Cognitive/Neuro/Behavioral WDL    Cognitive/Neuro/Behavioral WDL mood/behavior     Mood/Behavior sad        Pow[place]horn Coma Scale    Best Eye Response 4-->(E4) spontaneous     Best Motor Response 6-->(M6) obeys commands     Best Verbal Response 5-->(V5) oriented     [place] Coma Scale Score 15                     "

## 2024-06-08 NOTE — ED PROVIDER NOTES
History     Chief Complaint   Patient presents with    Mental Health Evaluation     HPI  Maddy Ortiz is a 38 year old female with past medical history significant for schizoaffective disorder for many years, details.  Paranoia 2 days ago type delusional disorder who presents the emergency department complaining of possible psychosis.  Patient states she has had a lot of thoughts running through her head and has been putting them out on paper.  She says they are mostly about recent relations.  She says she sent him to her family members and many were upset and told thinking of blocker.  This upset the patient and she feels she needs to be evaluated.  She has been taking her medications as directed but still thinks her thoughts are off.  She denies any homicidal or suicidal ideation is willing to come into the hospital if she needs to.  She denies any alcohol or drug use.  She denies any recent illness.  She has not had a fever or chills denies headache or visual changes denies neck pain has not any chest pain has not had shortness of breath denies abdominal pain back pain has not any focal numbness weakness any extremity or bowel bladder dysfunction.    Allergies:  Allergies   Allergen Reactions    Dust Mites Shortness Of Breath    Animal Dander      Other reaction(s): *Unknown    Mold      Other reaction(s): Runny Nose    Trees        Problem List:    Patient Active Problem List    Diagnosis Date Noted    Difficulty using pragmatics in communication 04/12/2024     Priority: Medium    Difficulty communicating 11/20/2023     Priority: Medium    Neck pain 10/06/2023     Priority: Medium    Hip pain, right 10/06/2023     Priority: Medium    Chronic pain of both knees 10/06/2023     Priority: Medium    mirena IUD 9/30/22 09/30/2022     Priority: Medium     Mirena IUD placed 9/30/2022  LOT# [id number]  Exp: 10/2024  NDC# 65249-210-31    Lexierobin Cartagena on 9/30/2022 at 1:35 PM          Overdose, undetermined intent,  initial encounter 06/18/2022     Priority: Medium    Segmental and somatic dysfunction 05/10/2022     Priority: Medium    SI (sacroiliac) joint dysfunction 05/10/2022     Priority: Medium    Polypharmacy 04/25/2022     Priority: Medium    Sedated due to multiple medications 04/25/2022     Priority: Medium    Elevated LFTs 01/08/2022     Priority: Medium    Disorganized behavior 01/08/2022     Priority: Medium    Infection due to 2019 novel coronavirus 01/08/2022     Priority: Medium    Type 2 diabetes mellitus without complication, without long-term current use of insulin (H) 12/13/2021     Priority: Medium    Fatty liver 11/05/2021     Priority: Medium    Umbilical hernia without obstruction and without gangrene 10/26/2021     Priority: Medium    Schizoaffective disorder (H) 07/13/2021     Priority: Medium    Morbid obesity (H) 01/02/2019     Priority: Medium    Paranoia (psychosis) (H) 08/24/2018     Priority: Medium    Gastroesophageal reflux disease without esophagitis 06/08/2018     Priority: Medium    Schizoaffective disorder, bipolar type (H) 05/07/2018     Priority: Medium    Encounter for IUD insertion 09/15/2017     Priority: Medium     4/24/22 bryanna insertion lot# [id number] exp-4/2023      Seasonal allergic rhinitis due to pollen 11/04/2016     Priority: Medium    Allergic rhinitis due to mold 11/04/2016     Priority: Medium    Allergic rhinitis due to animal dander 11/04/2016     Priority: Medium    Allergic rhinitis due to dust mite 11/04/2016     Priority: Medium    Depression with anxiety 01/26/2015     Priority: Medium    Insomnia 07/18/2013     Priority: Medium    Bipolar 1 disorder (H) 12/06/2012     Priority: Medium     Planning on seeing Purvi (psychiatric nurse)      Paranoid type delusional disorder (H) 11/14/2012     Priority: Medium    Psychosis (H) 10/16/2012     Priority: Medium    Animal dander allergy 05/09/2012     Priority: Medium     Dog and Cat      CARDIOVASCULAR SCREENING; LDL GOAL  LESS THAN 160 05/09/2012     Priority: Medium        Past Medical History:    Past Medical History:   Diagnosis Date    Bipolar 1 disorder (H) 12/6/2012    Depressive disorder     Diabetes mellitus, type 2 (H) 12/13/2021    Paranoid type delusional disorder (H) 11/14/2012       Past Surgical History:    Past Surgical History:   Procedure Laterality Date    TONSILLECTOMY & ADENOIDECTOMY      as a child    TONSILLECTOMY & ADENOIDECTOMY         Family History:    Family History   Adopted: Yes   Problem Relation Age of Onset    Unknown/Adopted Mother     Unknown/Adopted Father     Unknown/Adopted Other     Hypertension Sister        Social History:  Marital Status:  Single [1]  Social History     Tobacco Use    Smoking status: Former     Current packs/day: 0.50     Types: Cigarettes    Smokeless tobacco: Former    Tobacco comments:     around 2nd hand smoke   Vaping Use    Vaping status: Never Used   Substance Use Topics    Alcohol use: Not Currently    Drug use: Not Currently        Medications:    ACCU-CHEK GUIDE test strip  acetaminophen (TYLENOL) 325 MG tablet  albuterol (PROAIR HFA/PROVENTIL HFA/VENTOLIN HFA) 108 (90 Base) MCG/ACT inhaler  ammonium lactate (LAC-HYDRIN) 12 % external cream  azelastine (ASTELIN) 0.1 % nasal spray  azelastine (OPTIVAR) 0.05 % ophthalmic solution  blood glucose (ONETOUCH VERIO IQ) test strip  Blood Glucose Monitoring Suppl (ONETOUCH VERIO FLEX SYSTEM) w/Device KIT  budesonide-formoterol (SYMBICORT) 80-4.5 MCG/ACT Inhaler  fluticasone (FLONASE) 50 MCG/ACT nasal spray  gabapentin (NEURONTIN) 300 MG capsule  hydrocortisone 2.5 % ointment  ibuprofen (ADVIL/MOTRIN) 200 MG tablet  insulin pen needle (BD PEN NEEDLE ROZ 2ND GEN) 32G X 4 MM miscellaneous  ketoconazole (NIZORAL) 2 % external cream  lamoTRIgine (LAMICTAL) 200 MG tablet  levocetirizine (XYZAL) 5 MG tablet  levonorgestrel (MIRENA) 20 MCG/DAY IUD  levothyroxine (SYNTHROID/LEVOTHROID) 50 MCG tablet  liraglutide (VICTOZA PEN) 18  "MG/3ML solution  lithium ER (LITHOBID) 300 MG CR tablet  Melatonin 10 MG TABS tablet  naltrexone (DEPADE/REVIA) 50 MG tablet  OneTouch Delica Lancets 33G MISC  propranolol (INDERAL) 10 MG tablet  risperiDONE (RISPERDAL) 1 MG tablet  risperiDONE microspheres ER (RISPERDAL CONSTA) 50 MG injection  spacer (OPTICHAMBER APOLINAR) holding chamber  triamcinolone (KENALOG) 0.1 % external ointment  venlafaxine (EFFEXOR XR) 75 MG 24 hr capsule  vitamin D3 (CHOLECALCIFEROL) 50 mcg (2000 units) tablet          Review of Systems  As per HPI  Physical Exam   BP: (!) 136/94  Pulse: 105  Temp: 98.2  F (36.8  C)  Resp: 22  Height: 160 cm (5' 3\")  Weight: 99.8 kg (220 lb)  SpO2: 95 %      Physical Exam  Vitals and nursing note reviewed.   Constitutional:       General: She is not in acute distress.     Appearance: Normal appearance. She is not ill-appearing, toxic-appearing or diaphoretic.   HENT:      Head: Normocephalic and atraumatic.      Nose: Nose normal.      Mouth/Throat:      Mouth: Mucous membranes are moist.      Pharynx: Oropharynx is clear.   Cardiovascular:      Rate and Rhythm: Normal rate and regular rhythm.      Pulses: Normal pulses.      Heart sounds: Normal heart sounds. No murmur heard.  Pulmonary:      Effort: Pulmonary effort is normal.      Breath sounds: Normal breath sounds. No stridor. No wheezing or rhonchi.   Abdominal:      General: Abdomen is flat. Bowel sounds are normal. There is no distension.      Palpations: Abdomen is soft.      Tenderness: There is no abdominal tenderness. There is no right CVA tenderness or left CVA tenderness.   Musculoskeletal:         General: No tenderness. Normal range of motion.      Cervical back: Normal range of motion and neck supple.      Right lower leg: No edema.   Skin:     General: Skin is warm and dry.      Findings: No rash.   Neurological:      General: No focal deficit present.      Mental Status: She is alert and oriented to person, place, and time.      " Sensory: No sensory deficit.      Motor: No weakness.      Coordination: Coordination normal.   Psychiatric:      Comments: Flat affect some delusional thinking with no suicidal or homicidal ideation present.         ED Course        Procedures              Critical Care time:  none             Labs Ordered and Resulted from Time of ED Arrival to Time of ED Departure   COMPREHENSIVE METABOLIC PANEL - Normal       Result Value    Sodium 139      Potassium 3.8      Carbon Dioxide (CO2) 24      Anion Gap 12      Urea Nitrogen 14.3      Creatinine 0.91      GFR Estimate 82      Calcium 9.8      Chloride 103      Glucose 75      Alkaline Phosphatase 89      AST 27      ALT 36      Protein Total 7.4      Albumin 4.5      Bilirubin Total 0.3     LITHIUM LEVEL - Normal    Lithium 0.75     HCG QUALITATIVE PREGNANCY - Normal    hCG Serum Qualitative Negative     CBC WITH PLATELETS AND DIFFERENTIAL    WBC Count 9.0      RBC Count 5.02      Hemoglobin 14.7      Hematocrit 43.7      MCV 87      MCH 29.3      MCHC 33.6      RDW 11.9      Platelet Count 183      % Neutrophils 63      % Lymphocytes 25      % Monocytes 8      % Eosinophils 4      % Basophils 1      % Immature Granulocytes 0      NRBCs per 100 WBC 0      Absolute Neutrophils 5.6      Absolute Lymphocytes 2.2      Absolute Monocytes 0.7      Absolute Eosinophils 0.3      Absolute Basophils 0.1      Absolute Immature Granulocytes 0.0      Absolute NRBCs 0.0            Medications - No data to display    Assessments & Plan (with Medical Decision Making) reviewed including past medical history medications and allergies.  Psychiatry visit on 5/1/2024 was reviewed.  Labs were ordered.  I independently reviewed and interpreted labs.  A DEC consult was ordered.  CBC was unremarkable.  Comprehensive metabolic panel was unremarkable.  Pregnancy test negative lithium level was within normal notes.  DEC consultant felt patient was not a harm to herself or others she is not  suicidal at this time it is felt with close follow-up patient be discharged.  I discussed with the patient this and she feels comfortable going home at this time.  She contracted for safety with me.  Safety plan has been made by DEC and is in patient's discharge summary.  Patient understands that if she has any thoughts of harm or other concerns she can return for further evaluation and care.     I have reviewed the nursing notes.    I have reviewed the findings, diagnosis, plan and need for follow up with the patient.           New Prescriptions    No medications on file       Final diagnoses:   Schizoaffective disorder, bipolar type (H)       6/8/2024   [facility] EMERGENCY DEPT       Bradley, Jay Jay Okeefe MD  06/10/24 1128

## 2024-06-08 NOTE — DISCHARGE INSTRUCTIONS
Aftercare Plan  If I am feeling unsafe or I am in a crisis, I will:   Contact my established care providers   Call the National Suicide Prevention Lifeline: 790.303.7029   Go to the nearest emergency room   Call 911     Warning signs that I or other people might notice when a crisis is developing for me:     I am having increasing suicidal thoughts that turn to plans with intent or means  I am having additional urges to self-harm    My emotions are of hopelessness; feeling like there's no way out.  Rage or anger.  Engaging in risky activities without thinking  Withdrawing from family/friends  Dramatic mood swings  Drastic personality changes   Use of alcohol or drugs  Postings on social media  Neglect of personal hygiene or cares      Things I am able to do on my own to cope or help me feel better:    Other things to Try:  Spending quality time with loved ones  Staying hydrated  Eating balanced meals  Going for a walk every day  Take care of daily responsibilities/needs  Focus on positive self-talk vs negative self-talk     Things that I am able to do with others to cope or help me better:   Other things to Try:  Exercise  Music  Deep breathing  Meditations  Journal  Self-regulate  Self check-in  Ask for help     Things I can use or do for distraction:   Other things to Try:  Reach out to/spend time with family, friends  Shower  Exercise  Chores or do a project  Listen to music  Watch movie/TV  Listening to music  Journaling  Reading a book  Meditating  Call a friend     Changes I can make to support my mental health and wellness:    -I will abstain from all mood altering chemicals not currently prescribed to me   -I will attend scheduled mental health therapy and psychiatric appointments and follow all   recommendations  -I will commit to 30 minutes of self care daily - this can be as simple as taking a shower, going for a   walk, cooking a meal, read, writing, etc  -I will practice square breathing when I begin to  "feel anxious - in breath through the nose for the count   of 4 and the first line on the square. Out breath through the mouth for the count of 4 for the second line   of the square. Repeat to complete the square. Repeat the square as many times as needed.  - I will use distraction skills of: going for walks, watching TV, spending time outside, calling a friend or   family member  -Use community resources, including hotline numbers, [facility] crisis and support meetings  -Maintain a daily schedule/routine  -Practice deep breathing skills  -Download a meditation russell and spend 15-20 minutes per day mediating/relaxing. Some apps to   download include: Calm, Headspace and Insight Timer. All 3 of these apps have free version     People in my life that I can ask for help:   Family  Friends      Your [facility] has a mental health crisis team you can call 24/7: [facility] Mobile Crisis  [phone]          Crisis Lines  Crisis Text Line  Text 930179  You will be connected with a trained live crisis counselor to provide support.    david RehmanA a 781642 o texto a 442-AYUDAME en WhatsApp    [employer] (LGBTQ Youth Crisis Line)  1.101.353.8406  text START to 764-489      stickapps Resources  Fast Tracker  Linking people to mental health and substance use disorder resources  Leevia.org     [place] Mental Health Warm Line  Peer to peer support  Monday thru Saturday, 12 pm to 10 pm  659.646.2386 or 6.823.813.8736  Text \"Support\" to 69705    National [place] on Mental Illness (PARESH)  458.645.4602 or 1.888.PARESH.HELPS      Mental Health Apps  My3  https://my3app.org/    VirtualHopeBox  https://Maven Networks.org/apps/virtual-hope-box/      Crisis Lines  Crisis Text Line  Text 773452  You will be connected with a trained live crisis counselor to provide support.    Cindy kamara texto  JANINA a 930702 o texto a 442-AYUDAME en WhatsApp    National Hope Line  1.800.SUICIDE " "[2423623]      Community Resources  Fast Tracker  Linking people to mental health and substance use disorder resources  Health Information DesignsckParallocityn.org     [place] Mental Health Warm Line  Peer to peer support  Monday thru Saturday, 12 pm to 10 pm  141.818.7704 or 9.997.514.2165  Text \"Support\" to 79003    National [place] on Mental Illness (PARESH)  351.742.2752 or 1.888.PARESH.HELPS      Mental Health Apps  My3  https://Soccer Manager.org/    VirtualHopeBox  https://Genymobile/apps/virtual-hope-box/      Additional Information  Today you were seen by a licensed mental health professional through Triage and Transition services, [facility] (P)  for a crisis assessment in the Emergency Department at [facility].  It is recommended that you follow up with your established providers (psychiatrist, mental health therapist, and/or primary care doctor - as relevant) as soon as possible. Coordinators from [facility] will be calling you in the next 24-48 hours to ensure that you have the resources you need.  You can also contact [facility] coordinators directly at [phone]. You may have been scheduled for or offered an appointment with a mental health provider. [facility] maintains an extensive network of licensed behavioral health providers to connect patients with the services they need.  We do not charge providers a fee to participate in our referral network.  We match patients with providers based on a patient's specific needs, insurance coverage, and location.  Our first effort will be to refer you to a provider within your care system, and will utilize providers outside your care system as needed.               "

## 2024-06-09 NOTE — PLAN OF CARE
Maddy GREGG daron  June 8, 2024  Plan of Care Hand-off Note     Patient Care Path: discharge    Plan for Care:   After therapeutic assessment, intervention and aftercare planning by ED care team and LM and in consultation with attending provider, the patient's circumstances and mental state were appropriate for outpatient management. It is the recommendation of this clinician that pt discharge with OP MH support. A this time the pt is not presenting as an acute risk to self or others due to the following factors: Pt presents after sending an email to her brother who was upset by the content of the email. Patient is worried about alienating herself from her family. Patient does report increased depression, patient has a psychiatry appointment set for Monday.  and patient discussed making a list of what to bring up in her appointment on Monday regarding medication changes and symptoms patient is currently struggling with. pt is recommended to continue with outpatient providers for mental health services.    Identified Goals and Safety Issues: Discharge to community, follow-up with provider on Monday as scheduled    Overview:  Jay Jay Ortiz (Father)  [phone]            Legal Status: Legal Status at Admission: Voluntary/Patient has signed consent for treatment    Psychiatry Consult: NA       Updated  Dr. Huerta regarding plan of care.           Karly Gonzáles, NELLIESW

## 2024-06-09 NOTE — CONSULTS
"Diagnostic Evaluation Consultation  Crisis Assessment    Patient Name: Maddy Ortiz  Age:  38 year old  Legal Sex: female  Gender Identity: female  Pronouns:   Race:   Ethnicity: Not  or   Language: English      Patient was assessed: Virtual: [employer] Crisis Assessment Start Time: 1805 Crisis Assessment Stop Time: 1840  Patient location: [facility] EMERGENCY DEPT                             ED05    Referral Data and Chief Complaint  Maddy Ortiz presents to the ED by  self. Patient is presenting to the ED for the following concerns: Depression, Paranoia.   Factors that make the mental health crisis life threatening or complex are:  \"I was concerned I might have psychoisis. I was up late last night writing emails and I sent the emails out to my family. My brother sent an email back threatening to block me.\". Patient does not know what she said that would make him block her but feels like she said somthing wrong and does not wanto to upset her brother.  asked Patient to read the email. Patient read part of the email which was about eugenics. She stated \"eugenics is about love, eugenics helps the world\" she also stated \"I wrote about the Ick\", when asked to explain she stated \"when racial traits should not be repeated\"  \"ugengics is about reducing scarcity\" patient continues to report that she is concerned that her behavior will alienate her from her family.  Patient initially requested inpatient admission upon education regarding admission patient was agreeable to following up with outpatient services..      Informed Consent and Assessment Methods  Explained the crisis assessment process, including applicable information disclosures and limits to confidentiality, assessed understanding of the process, and obtained consent to proceed with the assessment.  Assessment methods included conducting a formal interview with patient, review of medical records, collaboration with " "medical staff, and obtaining relevant collateral information from family and community providers when available.  : done     Patient response to interventions: eager to participate  Coping skills were attempted to reduce the crisis:  Calling her support team     History of the Crisis   Pt reports she has a psychiatrist appointment on Monday and her scheduled Risperidol injection set for Tuesday.  Pt reports her depression symptoms have increased and  she has also been more irritable, increased sleep, and feeling withdrawn. Patient saw her psychiatrsit last week but was too sleepy to advocate for herself, she is taking medication as prescribed.  She also sees a therapist weekly but does not feel like therapy is helping.  Patient states that she wants medication changes but her psychiatrist says that her medications are effective and that she needs to work with her therapist.    Brief Psychosocial History  Family:  Single, Children  (pt reports she has a daughter and is the primary caregiver to her, \"her father is very absent and makes us feel bad when we ask him for support\")  Support System:  Sibling(s), Parent(s)  Employment Status:  disabled  Source of Income:  none, social security  Financial Environmental Concerns:  other (see comments) (Has Debt)  Current Hobbies:  outdoor activities, group/social activities, writing/journaling/blogging, reading, home improvement  Barriers in Personal Life:  lack of time, mental health concerns    Significant Clinical History  Current Anxiety Symptoms:  anxious, excessive worry, racing thoughts  Current Depression/Trauma:  sadness, irritable, withdrawl/isolation  Current Somatic Symptoms:     Current Psychosis/Thought Disturbance:  impulsive, inattentive (Does display some odd thought patterns and obsessive thoughts about eugenics.)  Current Eating Symptoms:   (Denies)  Chemical Use History:  Alcohol: None  Benzodiazepines: None  Opiates: None  Cocaine: None  Marijuana: " None  Other Use: None   Past diagnosis:  Other (schizoaffective, bipolar type)  Family history:  No known history of mental health or chemical health concerns  Past treatment:  Individual therapy, Psychiatric Medication Management, Day Treatment, Inpatient Hospitalization  Details of most recent treatment:  Per chart review, pt has a history of ED visits (last on 6/16/2023) and inpatient stays for mental health (last in 06/2022). Pt reports she is involved in therapy (weekly) and medication mangement. Pt reports she also has a home health nurse that comes to her house weekly and an in-home skills worker 3x weekly for total fo 12 hours per week.  Other relevant history:          Collateral Information  Is there collateral information: No (Patient's father did not return assessors call)     Collateral information name, relationship, phone number:       What happened today:       What is different about patient's functioning:       Concern about alcohol/drug use:      What do you think the patient needs:      Has patient made comments about wanting to kill themselves/others:      If d/c is recommended, can they take part in safety/aftercare planning:       Additional collateral information:        Risk Assessment  [place] Suicide Severity Rating Scale Full Clinical Version:  Suicidal Ideation  Q6 Suicide Behavior (Lifetime): yes          [place] Suicide Severity Rating Scale Recent:   Suicidal Ideation (Recent)  Q1 Wished to be Dead (Past Month): no  Q2 Suicidal Thoughts (Past Month): no  Within the Past 3 Months?: no  Level of Risk per Screen: moderate risk          Environmental or Psychosocial Events: social isolation, work or task failure  Protective Factors: Protective Factors: strong bond to family unit, community support, or employment, responsibilities and duties to others, including pets and children, lives in a responsibly safe and stable environment, good treatment engagement, help seeking    Does the  patient have thoughts of harming others? Feels Like Hurting Others: no  Previous Attempt to Hurt Others: no  Is the patient engaging in sexually inappropriate behavior?: no    Is the patient engaging in sexually inappropriate behavior?  no        Mental Status Exam   Affect: Blunted  Appearance: Appropriate  Attention Span/Concentration: Attentive  Eye Contact: Avoidant    Fund of Knowledge: Appropriate   Language /Speech Content: Fluent  Language /Speech Volume: Soft  Language /Speech Rate/Productions: Normal  Recent Memory: Intact  Remote Memory: Intact  Mood: Depressed, Sad, Anxious  Orientation to Person: Yes   Orientation to Place: Yes  Orientation to Time of Day: Yes  Orientation to Date: Yes     Situation (Do they understand why they are here?): Yes  Psychomotor Behavior: Normal  Thought Content: Paranoia  Thought Form: Intact     Mini-Cog Assessment  Number of Words Recalled:    Clock-Drawing Test:     Three Item Recall:    Mini-Cog Total Score:       Medication  Psychotropic medications:   Medication Orders - Psychiatric (From admission, onward)      None             Current Care Team  Patient Care Team:  Shirley Freeman APRN CNP as PCP - General (Nurse Practitioner)  Christie as [employer] worker  Eileen Stone  as  (Licensed Mental Health)  [facility] Consults as Occupational Therapist (Licensed Mental Health)  Aggie Lehman MD as MD (INTERNAL MEDICINE - ENDOCRINOLOGY, DIABETES & METABOLISM)  Shirley Freeman APRN CNP as Assigned PCP  Alexander Montemayor MD as MD (Psychiatry)  [facility] as Therapist (Licensed Mental Health)  Rudy Shin MD as Assigned Allergy Provider  Anna Hampton RD as Diabetes Educator (Dietitian, Registered)  Elías Montero MD as Surgeon (Surgery)  Shirley Gooden APRN CNP (Psychiatry)  Angeli Domingo RD as Registered Dietitian (Dietitian, Registered)  Shannon Mathias MD as Assigned Surgical Provider  Pedro Luis  Pam LEVINE MD as MD (OB/Gyn)  Kathryn Shepard MD as Assigned OBGYN Provider    Diagnosis  Patient Active Problem List   Diagnosis Code    Animal dander allergy J30.81    CARDIOVASCULAR SCREENING; LDL GOAL LESS THAN 160 Z13.6    Psychosis (H) F29    Paranoid type delusional disorder (H) F22    Bipolar 1 disorder (H) F31.9    Insomnia G47.00    Depression with anxiety F41.8    Seasonal allergic rhinitis due to pollen J30.1    Allergic rhinitis due to mold J30.89    Allergic rhinitis due to animal dander J30.81    Allergic rhinitis due to dust mite J30.89    Encounter for IUD insertion Z30.430    Schizoaffective disorder, bipolar type (H) F25.0    Gastroesophageal reflux disease without esophagitis K21.9    Paranoia (psychosis) (H) F22    Morbid obesity (H) E66.01    Schizoaffective disorder (H) F25.9    Umbilical hernia without obstruction and without gangrene K42.9    Fatty liver K76.0    Type 2 diabetes mellitus without complication, without long-term current use of insulin (H) E11.9    Elevated LFTs R79.89    Disorganized behavior R46.89    Infection due to 2019 novel coronavirus U07.1    Polypharmacy Z79.899    Sedated due to multiple medications R41.89    Overdose, undetermined intent, initial encounter T50.904A    Segmental and somatic dysfunction M99.09    SI (sacroiliac) joint dysfunction M53.3    mirena IUD 9/30/22 Z30.430    Neck pain M54.2    Hip pain, right M25.551    Chronic pain of both knees M25.561, M25.562, G89.29    Difficulty communicating F80.9    Difficulty using pragmatics in communication R47.02       Primary Problem This Admission  Active Hospital Problems    *Schizoaffective disorder, bipolar type (H)        Clinical Summary and Substantiation of Recommendations   After therapeutic assessment, intervention and aftercare planning by ED care team and LMHP and in consultation with attending provider, the patient's circumstances and mental state were appropriate for outpatient  management. It is the recommendation of this clinician that pt discharge with OP MH support. A this time the pt is not presenting as an acute risk to self or others due to the following factors: Pt presents after sending an email to her brother who was upset by the content of the email. Patient is worried about alienating herself from her family. Patient does report increased depression, patient has a psychiatry appointment set for Monday.  and patient discussed making a list of what to bring up in her appointment on Monday regarding medication changes and symptoms patient is currently struggling with. pt is recommended to continue with outpatient providers for mental health services.      Patient coping skills attempted to reduce the crisis:  Calling her support team    Disposition  Recommended disposition: Individual Therapy, Medication Management (ILS worker and in-home nurse care)        Reviewed case and recommendations with attending provider. Attending Name: Dr. Huerta       Attending concurs with disposition: yes       Patient and/or validated legal guardian concurs with disposition:   yes       Final disposition:  discharge    Legal status on admission: Voluntary/Patient has signed consent for treatment    Assessment Details   Total duration spent with the patient: 35 min     CPT code(s) utilized: 16819 - Psychotherapy for Crisis - 60 (30-74*) min    SABRA Johnson, Psychotherapist  [facility] - Triage & Transition Services  Callback: [phone]

## 2024-06-10 ENCOUNTER — TRANSFERRED RECORDS (OUTPATIENT)
Dept: HEALTH INFORMATION MANAGEMENT | Facility: CLINIC | Age: 39
End: 2024-06-10
Payer: COMMERCIAL

## 2024-06-10 DIAGNOSIS — H10.13 ALLERGIC CONJUNCTIVITIS OF BOTH EYES: ICD-10-CM

## 2024-06-10 NOTE — TELEPHONE ENCOUNTER
90 Day Supply Requested  Requested Prescriptions   Pending Prescriptions Disp Refills    azelastine (ASTELIN) 0.1 % nasal spray 30 mL 3     Sig: Spray 2 sprays into both nostrils 2 times daily as needed for rhinitis       There is no refill protocol information for this order          Last office visit: 1/26/2024 ; last virtual visit: 2/29/2024 with prescribing provider:  Rudy Shin   Future Office Visit:   Next 5 appointments (look out 90 days)      Jun 28, 2024 11:20 AM  (Arrive by 11:00 AM)  Provider Visit with PHILL Luo CNP  [facility] )  Arrive at: Clinic [facility] [address] [facility] [id number] [phone]           Thank you,  Radha Mistry  [facility]  [address] [place] [address]  Priority line: [phone] (please do not share number with patient)   Employed by [facility]

## 2024-06-11 DIAGNOSIS — E11.9 TYPE 2 DIABETES MELLITUS WITHOUT COMPLICATION, WITHOUT LONG-TERM CURRENT USE OF INSULIN (H): ICD-10-CM

## 2024-06-11 RX ORDER — AZELASTINE 1 MG/ML
2 SPRAY, METERED NASAL 2 TIMES DAILY PRN
Qty: 90 ML | Refills: 1 | Status: SHIPPED | OUTPATIENT
Start: 2024-06-11

## 2024-06-11 RX ORDER — LIRAGLUTIDE 6 MG/ML
INJECTION SUBCUTANEOUS
Qty: 27 ML | Refills: 1 | Status: SHIPPED | OUTPATIENT
Start: 2024-06-11 | End: 2024-08-30

## 2024-06-11 NOTE — TELEPHONE ENCOUNTER
Will forward to provider for review. Pt was last seen virtually on 2/29/2024 with no follow up recommended.    Pt is requesting 90 day supply of azelastine nasal spray. 90 day cued up with 1 refill if appropriate.     Callie VELASQUEZ RN  Specialty/Allergy Clinics

## 2024-06-12 ENCOUNTER — VIRTUAL VISIT (OUTPATIENT)
Dept: SURGERY | Facility: CLINIC | Age: 39
End: 2024-06-12
Payer: COMMERCIAL

## 2024-06-12 VITALS — WEIGHT: 220 LBS | BODY MASS INDEX: 38.98 KG/M2 | HEIGHT: 63 IN

## 2024-06-12 DIAGNOSIS — E11.9 TYPE 2 DIABETES MELLITUS WITHOUT COMPLICATION, WITHOUT LONG-TERM CURRENT USE OF INSULIN (H): ICD-10-CM

## 2024-06-12 DIAGNOSIS — K76.0 FATTY LIVER: ICD-10-CM

## 2024-06-12 DIAGNOSIS — E66.01 MORBID OBESITY (H): Primary | ICD-10-CM

## 2024-06-12 PROCEDURE — 99214 OFFICE O/P EST MOD 30 MIN: CPT | Mod: 95 | Performed by: FAMILY MEDICINE

## 2024-06-12 ASSESSMENT — PAIN SCALES - GENERAL: PAINLEVEL: NO PAIN (0)

## 2024-06-12 NOTE — NURSING NOTE
Is the patient currently in the state of [place]? YES    Visit mode:VIDEO    If the visit is dropped, the patient can be reconnected by: VIDEO VISIT: Send to e-mail at: [email or web address]    Will anyone else be joining the visit? NO  (If patient encounters technical issues they should call [phone])    How would you like to obtain your AVS? MyChart    Are changes needed to the allergy or medication list? No, Pt stated no changes to allergies, and Pt stated no med changes    Are refills needed on medications prescribed by this physician? NO    Reason for visit: RECHECK (INDIRA)    Myrtle ROWLAND

## 2024-06-12 NOTE — PROGRESS NOTES
Virtual Visit Details    Type of service:  Video Visit   Video Start Time: 1:47 PM  Video End Time:2:01 PM    Originating Location (pt. Location): Home    Distant Location (provider location):  Off-site  Platform used for Video Visit: [facility]    B[facility] Non Surgical Follow up Visit   Date of visit: 6/12/2024  Physician: LONDON Serna MD, MD  Primary Care is Shirley Freeman.  Maddy Jonesty   38 year old  female     Initial Weight: 202#  Initial BMI: 34.14  Today's Weight:   Wt Readings from Last 1 Encounters:   06/12/24 99.8 kg (220 lb)     Body mass index is 38.98 kg/m .           Assessment and Plan   Assessment: Maddy is a 38 year old year old female who presents for medical weight management.      Plan:    1. Morbid obesity (H)  Patient was congratulated on her success thus far. Healthy habits to assist with further weight loss were discussed. She will continue to work on getting in fruits and vegetables and will try to decrease her intake of crackers and cookies. She will continue the naltrexone. We discussed the patient's co-morbid conditions including DM and fatty liver disease. These likely will improve with healthy habits and weight loss.     2. Type 2 diabetes mellitus without complication, without long-term current use of insulin (H)  This may improve with healthy habits and weight loss.     3. Fatty liver  This may improve with healthy habits and weight loss.      Follow up in 4 months with myself           INTERIM HISTORY  Patient took a break from working with us last July because she really didn't feel she could make any of the nutritional changes we were suggesting due to her mental health issues. She tried to get an eating disorder evaluation at [facility] and she was told she did not qualify. She met with our dietician last month. She is taking victoza for her diabetes. She thinks it helps control her appetite but it is cancelled out by the Risperdal. She is also taking  naltrexone.     Goals set with dietician 5/29/24:  Eat breakfast daily/3 meals per day  Continue to aim to have a fruit/veggie daily. Incorporate fresh, frozen, canned fruits and veggies with meals - met for awhile, off track now due to expense  Stay consistent with activity - walking     DIETARY HISTORY  Meals Per Day: 1-2 mom's meals per day  Eating Protein First?: sometimes  Food Diary: pasta, stir fries  Typical Snack: vegetables sometimes, cookies, crackers  Fluid Intake: working on it  Portion Control: sometimes  Calorie Containing Beverages: cut back, Skittles drink packets      Positive Changes Since Last Visit: She was eating more fruits and vegetables but is struggling a bit again, water intake, decreased sugared beverages  Struggling With: Eating mom's meals because she can't afford to buy her own food    Knowledgeable in Reading Food Labels: not sure  Getting Adequate Protein: sometmes      PHYSICAL ACTIVITY PATTERNS:  Biking for transportation to the library, every other day for 1-3 miles    REVIEW OF SYSTEMS  GENERAL/CONSTITUTIONAL:  Fatigue: yes  HEENT:   glaucoma: no  CARDIOVASCULAR:  History of heart disease: no  GI:  Pancreatitis: no  PSYCHIATRIC:  Moods: Fairly stable for her  MUSCULOSKELETAL/RHEUMATOLOGIC  Arthralgias: no  Myalgias: no  ENDOCRINE:  Monitoring Blood Sugars: no  Sugars Well Controlled: na  No personal or family history of medullary thyroid cancer no  :  Birth control: ocp       Patient Profile   Social History     Social History Narrative    One child    Education: some college        02/29/24        ENVIRONMENTAL HISTORY: [place] The home is heated with a electric furnace. They do not have central air conditioning, does have box air conditioner in the wall and has air purifier. The patient's bedroom is furnished with stuffed animals in bed, carpeting in bedroom and fabric window coverings. Pets inside the apartment includes 1  "cat. There is history of cockroach or mice infestation. There are no smokers in the house.  The apartment does not have a basement.             Past Medical History   Past Medical History:   Diagnosis Date    Bipolar 1 disorder (H) 12/6/2012    Depressive disorder     Diabetes mellitus, type 2 (H) 12/13/2021    Paranoid type delusional disorder (H) 11/14/2012     Patient Active Problem List   Diagnosis    Animal dander allergy    CARDIOVASCULAR SCREENING; LDL GOAL LESS THAN 160    Psychosis (H)    Paranoid type delusional disorder (H)    Bipolar 1 disorder (H)    Insomnia    Depression with anxiety    Seasonal allergic rhinitis due to pollen    Allergic rhinitis due to mold    Allergic rhinitis due to animal dander    Allergic rhinitis due to dust mite    Encounter for IUD insertion    Schizoaffective disorder, bipolar type (H)    Gastroesophageal reflux disease without esophagitis    Paranoia (psychosis) (H)    Morbid obesity (H)    Schizoaffective disorder (H)    Umbilical hernia without obstruction and without gangrene    Fatty liver    Type 2 diabetes mellitus without complication, without long-term current use of insulin (H)    Elevated LFTs    Disorganized behavior    Infection due to 2019 novel coronavirus    Polypharmacy    Sedated due to multiple medications    Overdose, undetermined intent, initial encounter    Segmental and somatic dysfunction    SI (sacroiliac) joint dysfunction    mirena IUD 9/30/22    Neck pain    Hip pain, right    Chronic pain of both knees    Difficulty communicating    Difficulty using pragmatics in communication       Past Surgical History  She has a past surgical history that includes tonsillectomy & adenoidectomy and tonsillectomy & adenoidectomy.     Examination   Ht 1.6 m (5' 2.99\")   Wt 99.8 kg (220 lb)   BMI 38.98 kg/m    Wt Readings from Last 4 Encounters:   06/12/24 99.8 kg (220 lb)   06/08/24 99.8 kg (220 lb)   03/22/24 104.3 kg (230 lb)   02/16/24 105.2 kg (232 lb)    "   BP Readings from Last 3 Encounters:   06/08/24 (!) 136/94   02/16/24 115/76   01/26/24 120/83    GENERAL: alert and no distress  EYES: Eyes grossly normal to inspection.  No discharge or erythema, or obvious scleral/conjunctival abnormalities.  RESP: No audible wheeze, cough, or visible cyanosis.    SKIN: Visible skin clear. No significant rash, abnormal pigmentation or lesions.  NEURO: Cranial nerves grossly intact.  Mentation and speech appropriate for age.  PSYCH: Appropriate affect, tone, and pace of words        Counseling:   We reviewed the important post op bariatric recommendations:  -eating 3 meals daily  -eating protein first, getting >60gm protein daily  -eating slowly, chewing food well  -avoiding/limiting calorie containing beverages  -limiting starchy vegetables and carbohydrates, choosing wheat, not white with breads,   crackers, pastas, marquita, bagels, tortillas, rice  -limiting restaurant or cafeteria eating to twice a week or less    We discussed the importance of restorative sleep and stress management in maintaining a healthy weight.  We discussed the National Weight Control Registry healthy weight maintenance strategies and ways to optimize metabolism.  We discussed the importance of physical activity including cardiovascular and strength training in maintaining a healthier weight.    Total time spent on the date of this encounter doing: chart review, review of test results, patient visit, physical exam, education, counseling, developing plan of care and documenting = 33 minutes.         LONDON Serna MD  [facility]

## 2024-06-12 NOTE — LETTER
6/12/2024      Maddy Ortiz  [address]  [facility] [address]      Dear Colleague,    Thank you for referring your patient, Maddy Ortiz, to the [facility] AND [facility] [place]. Please see a copy of my visit note below.    Virtual Visit Details    Type of service:  Video Visit   Video Start Time: 1:47 PM  Video End Time:2:01 PM    Originating Location (pt. Location): Home    Distant Location (provider location):  Off-site  Platform used for Video Visit: [facility]    Bariatric Care Clinic Non Surgical Follow up Visit   Date of visit: 6/12/2024  Physician: LONDON Serna MD, MD  Primary Care is Shirley Freeman.  Maddy Ortiz   38 year old  female     Initial Weight: 202#  Initial BMI: 34.14  Today's Weight:   Wt Readings from Last 1 Encounters:   06/12/24 99.8 kg (220 lb)     Body mass index is 38.98 kg/m .           Assessment and Plan   Assessment: Maddy is a 38 year old year old female who presents for medical weight management.      Plan:    1. Morbid obesity (H)  Patient was congratulated on her success thus far. Healthy habits to assist with further weight loss were discussed. She will continue to work on getting in fruits and vegetables and will try to decrease her intake of crackers and cookies. She will continue the naltrexone. We discussed the patient's co-morbid conditions including DM and fatty liver disease. These likely will improve with healthy habits and weight loss.     2. Type 2 diabetes mellitus without complication, without long-term current use of insulin (H)  This may improve with healthy habits and weight loss.     3. Fatty liver  This may improve with healthy habits and weight loss.      Follow up in 4 months with myself           INTERIM HISTORY  Patient took a break from working with us last July because she really didn't feel she could make any of the nutritional changes we were suggesting due to her mental health issues. She tried to get  an eating disorder evaluation at [facility] and she was told she did not qualify. She met with our dietician last month. She is taking victoza for her diabetes. She thinks it helps control her appetite but it is cancelled out by the Risperdal. She is also taking naltrexone.     Goals set with dietician 5/29/24:  Eat breakfast daily/3 meals per day  Continue to aim to have a fruit/veggie daily. Incorporate fresh, frozen, canned fruits and veggies with meals - met for awhile, off track now due to expense  Stay consistent with activity - walking     DIETARY HISTORY  Meals Per Day: 1-2 mom's meals per day  Eating Protein First?: sometimes  Food Diary: pasta, stir fries  Typical Snack: vegetables sometimes, cookies, crackers  Fluid Intake: working on it  Portion Control: sometimes  Calorie Containing Beverages: cut back, Skittles drink packets      Positive Changes Since Last Visit: She was eating more fruits and vegetables but is struggling a bit again, water intake, decreased sugared beverages  Struggling With: Eating mom's meals because she can't afford to buy her own food    Knowledgeable in Reading Food Labels: not sure  Getting Adequate Protein: sometmes      PHYSICAL ACTIVITY PATTERNS:  Biking for transportation to the library, every other day for 1-3 miles    REVIEW OF SYSTEMS  GENERAL/CONSTITUTIONAL:  Fatigue: yes  HEENT:   glaucoma: no  CARDIOVASCULAR:  History of heart disease: no  GI:  Pancreatitis: no  PSYCHIATRIC:  Moods: Fairly stable for her  MUSCULOSKELETAL/RHEUMATOLOGIC  Arthralgias: no  Myalgias: no  ENDOCRINE:  Monitoring Blood Sugars: no  Sugars Well Controlled: na  No personal or family history of medullary thyroid cancer no  :  Birth control: ocp       Patient Profile   Social History     Social History Narrative    One child    Education: some college        02/29/24        ENVIRONMENTAL HISTORY: [place] The home is heated with a electric  furnace. They do not have central air conditioning, does have box air conditioner in the wall and has air purifier. The patient's bedroom is furnished with stuffed animals in bed, carpeting in bedroom and fabric window coverings. Pets inside the apartment includes 1 cat. There is history of cockroach or mice infestation. There are no smokers in the house.  The apartment does not have a basement.             Past Medical History   Past Medical History:   Diagnosis Date     Bipolar 1 disorder (H) 12/6/2012     Depressive disorder      Diabetes mellitus, type 2 (H) 12/13/2021     Paranoid type delusional disorder (H) 11/14/2012     Patient Active Problem List   Diagnosis     Animal dander allergy     CARDIOVASCULAR SCREENING; LDL GOAL LESS THAN 160     Psychosis (H)     Paranoid type delusional disorder (H)     Bipolar 1 disorder (H)     Insomnia     Depression with anxiety     Seasonal allergic rhinitis due to pollen     Allergic rhinitis due to mold     Allergic rhinitis due to animal dander     Allergic rhinitis due to dust mite     Encounter for IUD insertion     Schizoaffective disorder, bipolar type (H)     Gastroesophageal reflux disease without esophagitis     Paranoia (psychosis) (H)     Morbid obesity (H)     Schizoaffective disorder (H)     Umbilical hernia without obstruction and without gangrene     Fatty liver     Type 2 diabetes mellitus without complication, without long-term current use of insulin (H)     Elevated LFTs     Disorganized behavior     Infection due to 2019 novel coronavirus     Polypharmacy     Sedated due to multiple medications     Overdose, undetermined intent, initial encounter     Segmental and somatic dysfunction     SI (sacroiliac) joint dysfunction     mirena IUD 9/30/22     Neck pain     Hip pain, right     Chronic pain of both knees     Difficulty communicating     Difficulty using pragmatics in communication       Past Surgical History  She has a past surgical history that  "includes tonsillectomy & adenoidectomy and tonsillectomy & adenoidectomy.     Examination   Ht 1.6 m (5' 2.99\")   Wt 99.8 kg (220 lb)   BMI 38.98 kg/m    Wt Readings from Last 4 Encounters:   06/12/24 99.8 kg (220 lb)   06/08/24 99.8 kg (220 lb)   03/22/24 104.3 kg (230 lb)   02/16/24 105.2 kg (232 lb)      BP Readings from Last 3 Encounters:   06/08/24 (!) 136/94   02/16/24 115/76   01/26/24 120/83    GENERAL: alert and no distress  EYES: Eyes grossly normal to inspection.  No discharge or erythema, or obvious scleral/conjunctival abnormalities.  RESP: No audible wheeze, cough, or visible cyanosis.    SKIN: Visible skin clear. No significant rash, abnormal pigmentation or lesions.  NEURO: Cranial nerves grossly intact.  Mentation and speech appropriate for age.  PSYCH: Appropriate affect, tone, and pace of words        Counseling:   We reviewed the important post op bariatric recommendations:  -eating 3 meals daily  -eating protein first, getting >60gm protein daily  -eating slowly, chewing food well  -avoiding/limiting calorie containing beverages  -limiting starchy vegetables and carbohydrates, choosing wheat, not white with breads,   crackers, pastas, marquita, bagels, tortillas, rice  -limiting restaurant or cafeteria eating to twice a week or less    We discussed the importance of restorative sleep and stress management in maintaining a healthy weight.  We discussed the National Weight Control Registry healthy weight maintenance strategies and ways to optimize metabolism.  We discussed the importance of physical activity including cardiovascular and strength training in maintaining a healthier weight.    Total time spent on the date of this encounter doing: chart review, review of test results, patient visit, physical exam, education, counseling, developing plan of care and documenting = 33 minutes.         LONDON Serna MD  [facility]               Again, thank you for allowing me to " participate in the care of your patient.        Sincerely,        LONDON Serna MD

## 2024-06-16 ENCOUNTER — MYC MEDICAL ADVICE (OUTPATIENT)
Dept: FAMILY MEDICINE | Facility: CLINIC | Age: 39
End: 2024-06-16
Payer: COMMERCIAL

## 2024-06-28 ENCOUNTER — LAB (OUTPATIENT)
Dept: LAB | Facility: CLINIC | Age: 39
End: 2024-06-28
Payer: COMMERCIAL

## 2024-06-28 DIAGNOSIS — E11.9 TYPE 2 DIABETES MELLITUS WITHOUT COMPLICATION, WITHOUT LONG-TERM CURRENT USE OF INSULIN (H): ICD-10-CM

## 2024-06-28 LAB
CREAT UR-MCNC: 117.4 MG/DL
HBA1C MFR BLD: 5.1 % (ref 0–5.6)
MICROALBUMIN UR-MCNC: 33.5 MG/L
MICROALBUMIN/CREAT UR: 28.53 MG/G CR (ref 0–25)

## 2024-06-28 PROCEDURE — 82043 UR ALBUMIN QUANTITATIVE: CPT

## 2024-06-28 PROCEDURE — 82570 ASSAY OF URINE CREATININE: CPT

## 2024-06-28 PROCEDURE — 83036 HEMOGLOBIN GLYCOSYLATED A1C: CPT

## 2024-06-28 PROCEDURE — 36415 COLL VENOUS BLD VENIPUNCTURE: CPT

## 2024-07-03 ENCOUNTER — TRANSFERRED RECORDS (OUTPATIENT)
Dept: HEALTH INFORMATION MANAGEMENT | Facility: CLINIC | Age: 39
End: 2024-07-03
Payer: COMMERCIAL

## 2024-07-03 LAB — PHQ9 SCORE: 2

## 2024-07-11 ENCOUNTER — HOSPITAL ENCOUNTER (EMERGENCY)
Facility: CLINIC | Age: 39
Discharge: HOME | End: 2024-07-11
Attending: FAMILY MEDICINE | Admitting: FAMILY MEDICINE
Payer: COMMERCIAL

## 2024-07-11 VITALS
HEIGHT: 64 IN | DIASTOLIC BLOOD PRESSURE: 90 MMHG | SYSTOLIC BLOOD PRESSURE: 128 MMHG | HEART RATE: 72 BPM | OXYGEN SATURATION: 96 % | RESPIRATION RATE: 18 BRPM | TEMPERATURE: 97.9 F | BODY MASS INDEX: 37.56 KG/M2 | WEIGHT: 220 LBS

## 2024-07-11 DIAGNOSIS — N76.0 BACTERIAL VAGINOSIS: ICD-10-CM

## 2024-07-11 DIAGNOSIS — B96.89 BACTERIAL VAGINOSIS: ICD-10-CM

## 2024-07-11 LAB
CLUE CELLS: ABNORMAL
T VAGINALIS DNA SPEC QL NAA+PROBE: NOT DETECTED
TRICHOMONAS, WET PREP: ABNORMAL
WBC'S/HIGH POWER FIELD, WET PREP: ABNORMAL
YEAST, WET PREP: ABNORMAL

## 2024-07-11 PROCEDURE — 87491 CHLMYD TRACH DNA AMP PROBE: CPT | Performed by: FAMILY MEDICINE

## 2024-07-11 PROCEDURE — 87210 SMEAR WET MOUNT SALINE/INK: CPT | Mod: XU | Performed by: FAMILY MEDICINE

## 2024-07-11 PROCEDURE — 87661 TRICHOMONAS VAGINALIS AMPLIF: CPT | Performed by: FAMILY MEDICINE

## 2024-07-11 PROCEDURE — 87591 N.GONORRHOEAE DNA AMP PROB: CPT | Performed by: FAMILY MEDICINE

## 2024-07-11 PROCEDURE — 99284 EMERGENCY DEPT VISIT MOD MDM: CPT | Performed by: FAMILY MEDICINE

## 2024-07-11 RX ORDER — METRONIDAZOLE 7.5 MG/G
1 GEL VAGINAL AT BEDTIME
Qty: 25 G | Refills: 0 | Status: SHIPPED | OUTPATIENT
Start: 2024-07-11 | End: 2024-07-16

## 2024-07-11 ASSESSMENT — ACTIVITIES OF DAILY LIVING (ADL)
ADLS_ACUITY_SCORE: 35

## 2024-07-11 NOTE — ED PROVIDER NOTES
History     Chief Complaint   Patient presents with    Vaginal Itching     HPI  Maddy Ortiz is a 38 year old female who presents with a history of schizoaffective disorder bipolar disorder, fatty liver and type 2 diabetes.  She presents here with vaginal itch and mild discharge without pelvic pain or fever.  No abdominal pain nausea vomiting.  Denies current pregnancy.  No dysuria urgency frequency or hematuria.  She has a Mirena IUD in as of 2022.  She denies STD exposure history.  No associated fever    Allergies:  Allergies   Allergen Reactions    Dust Mites Shortness Of Breath    Animal Dander      Other reaction(s): *Unknown    Mold      Other reaction(s): Runny Nose    Trees        Problem List:    Patient Active Problem List    Diagnosis Date Noted    Difficulty using pragmatics in communication 04/12/2024     Priority: Medium    Difficulty communicating 11/20/2023     Priority: Medium    Neck pain 10/06/2023     Priority: Medium    Hip pain, right 10/06/2023     Priority: Medium    Chronic pain of both knees 10/06/2023     Priority: Medium    mirena IUD 9/30/22 09/30/2022     Priority: Medium     Mirena IUD placed 9/30/2022  LOT# [id number]  Exp: 10/2024  NDC# 40419-856-85    Lexie Cartagena on 9/30/2022 at 1:35 PM          Overdose, undetermined intent, initial encounter 06/18/2022     Priority: Medium    Segmental and somatic dysfunction 05/10/2022     Priority: Medium    SI (sacroiliac) joint dysfunction 05/10/2022     Priority: Medium    Polypharmacy 04/25/2022     Priority: Medium    Sedated due to multiple medications 04/25/2022     Priority: Medium    Elevated LFTs 01/08/2022     Priority: Medium    Disorganized behavior 01/08/2022     Priority: Medium    Infection due to 2019 novel coronavirus 01/08/2022     Priority: Medium    Type 2 diabetes mellitus without complication, without long-term current use of insulin (H) 12/13/2021     Priority: Medium    Fatty liver 11/05/2021     Priority: Medium     Umbilical hernia without obstruction and without gangrene 10/26/2021     Priority: Medium    Schizoaffective disorder (H) 07/13/2021     Priority: Medium    Morbid obesity (H) 01/02/2019     Priority: Medium    Paranoia (psychosis) (H) 08/24/2018     Priority: Medium    Gastroesophageal reflux disease without esophagitis 06/08/2018     Priority: Medium    Schizoaffective disorder, bipolar type (H) 05/07/2018     Priority: Medium    Encounter for IUD insertion 09/15/2017     Priority: Medium     4/24/22 bryanna insertion lot# [id number] exp-4/2023      Seasonal allergic rhinitis due to pollen 11/04/2016     Priority: Medium    Allergic rhinitis due to mold 11/04/2016     Priority: Medium    Allergic rhinitis due to animal dander 11/04/2016     Priority: Medium    Allergic rhinitis due to dust mite 11/04/2016     Priority: Medium    Depression with anxiety 01/26/2015     Priority: Medium    Insomnia 07/18/2013     Priority: Medium    Bipolar 1 disorder (H) 12/06/2012     Priority: Medium     Planning on seeing Purvi (psychiatric nurse)      Paranoid type delusional disorder (H) 11/14/2012     Priority: Medium    Psychosis (H) 10/16/2012     Priority: Medium    Animal dander allergy 05/09/2012     Priority: Medium     Dog and Cat      CARDIOVASCULAR SCREENING; LDL GOAL LESS THAN 160 05/09/2012     Priority: Medium        Past Medical History:    Past Medical History:   Diagnosis Date    Bipolar 1 disorder (H) 12/6/2012    Depressive disorder     Diabetes mellitus, type 2 (H) 12/13/2021    Paranoid type delusional disorder (H) 11/14/2012       Past Surgical History:    Past Surgical History:   Procedure Laterality Date    TONSILLECTOMY & ADENOIDECTOMY      as a child    TONSILLECTOMY & ADENOIDECTOMY         Family History:    Family History   Adopted: Yes   Problem Relation Age of Onset    Unknown/Adopted Mother     Unknown/Adopted Father     Unknown/Adopted Other     Hypertension Sister        Social History:  Marital  Status:  Single [1]  Social History     Tobacco Use    Smoking status: Former     Current packs/day: 0.50     Types: Cigarettes    Smokeless tobacco: Former    Tobacco comments:     around 2nd hand smoke   Vaping Use    Vaping status: Never Used   Substance Use Topics    Alcohol use: Not Currently    Drug use: Not Currently        Medications:    ACCU-CHEK GUIDE test strip  acetaminophen (TYLENOL) 325 MG tablet  albuterol (PROAIR HFA/PROVENTIL HFA/VENTOLIN HFA) 108 (90 Base) MCG/ACT inhaler  ammonium lactate (LAC-HYDRIN) 12 % external cream  azelastine (ASTELIN) 0.1 % nasal spray  azelastine (OPTIVAR) 0.05 % ophthalmic solution  blood glucose (ONETOUCH VERIO IQ) test strip  Blood Glucose Monitoring Suppl (ONETOUCH VERIO FLEX SYSTEM) w/Device KIT  budesonide-formoterol (SYMBICORT) 80-4.5 MCG/ACT Inhaler  fluticasone (FLONASE) 50 MCG/ACT nasal spray  gabapentin (NEURONTIN) 300 MG capsule  hydrocortisone 2.5 % ointment  ibuprofen (ADVIL/MOTRIN) 200 MG tablet  insulin pen needle (BD PEN NEEDLE ROZ 2ND GEN) 32G X 4 MM miscellaneous  ketoconazole (NIZORAL) 2 % external cream  lamoTRIgine (LAMICTAL) 200 MG tablet  levocetirizine (XYZAL) 5 MG tablet  levonorgestrel (MIRENA) 20 MCG/DAY IUD  levothyroxine (SYNTHROID/LEVOTHROID) 50 MCG tablet  liraglutide (VICTOZA PEN) 18 MG/3ML solution  lithium ER (LITHOBID) 300 MG CR tablet  Melatonin 10 MG TABS tablet  naltrexone (DEPADE/REVIA) 50 MG tablet  OneTouch Delica Lancets 33G MISC  propranolol (INDERAL) 10 MG tablet  risperiDONE (RISPERDAL) 1 MG tablet  risperiDONE microspheres ER (RISPERDAL CONSTA) 50 MG injection  spacer (OPTICHAMBER APOLINAR) holding chamber  triamcinolone (KENALOG) 0.1 % external ointment  venlafaxine (EFFEXOR XR) 75 MG 24 hr capsule  vitamin D3 (CHOLECALCIFEROL) 50 mcg (2000 units) tablet          Review of Systems  ROS:  5 point ROS negative except as noted above in HPI, including Gen., Resp., CV, GI &  system review.      Physical Exam   BP: (!)  "128/90  Pulse: 72  Temp: 97.9  F (36.6  C)  Resp: 18  Height: 162.6 cm (5' 4\")  Weight: 99.8 kg (220 lb)  SpO2: 96 %      Physical Exam  Constitutional:       General: She is in acute distress.      Appearance: She is not diaphoretic.   Eyes:      Conjunctiva/sclera: Conjunctivae normal.   Pulmonary:      Effort: Pulmonary effort is normal.      Breath sounds: Normal breath sounds.   Abdominal:      General: Abdomen is flat.      Tenderness: There is no abdominal tenderness.   Skin:     Findings: No rash.   Neurological:      Mental Status: She is alert.       Chaperoned exam.  vaginal exam  is with a clearish discharge that has a slight fishy odor.  There is no foul discharge no significant bleeding.  GC chlamydia and wet prep is performed.      ED Course        Procedures              Critical Care time:  none               Results for orders placed or performed during the hospital encounter of 07/11/24 (from the past 24 hour(s))   Wet prep    Specimen: Vagina; Swab   Result Value Ref Range    Trichomonas Absent Absent    Yeast Absent Absent    Clue Cells Absent Absent    WBCs/high power field 3+ (A) None   Trichomonas vaginalis by PCR    Specimen: Vagina; Swab   Result Value Ref Range    Trichomonas vaginalis by PCR Not Detected Not Detected    Narrative    The [employer] Xpert TV Assay, performed on the [employer]  Instrument Systems, is a qualitative in vitro diagnostic test for the detection of Trichomonas vaginalis genomic DNA. The test utilizes automated real-time polymerase chain reaction (PCR). The Xpert TV Assay uses female and male urine specimens, endocervical swab specimens, or patient-collected vaginal swab specimens (collected in a clinical setting). The Xpert TV Assay is intended to aid in the diagnosis of trichomoniasis in symptomatic or asymptomatic individuals.     *Note: Due to a large number of results and/or encounters for the requested time period, some results have not been displayed. A complete " set of results can be found in Results Review.       Medications - No data to display    Assessments & Plan (with Medical Decision Making)     MDM; Maddy Ortiz is a 38 year old female presenting with vaginal discharge of a clear nature no pelvic pain or fever or other systemic symptoms.  Swabs are performed.  Denies current pregnancy and has Mirena IUD in place.  Reassuring vital signs.    She has no red flag findings on exam.  Suspect this may be bacterial vaginosis as I did smell a small fishy odor with the discharge.  Did send trichomonas GC chlamydia.  Recommended using metronidazole and she was given a choice of preparation and selected intravaginal metronidazole.  There are alternative diagnoses that can give discharge including Ureaplasma and mycoplasma for which doxycycline would treat both.  Precautions given for return.  I have reviewed the nursing notes.    I have reviewed the findings, diagnosis, plan and need for follow up with the patient.           Medical Decision Making  The patient's presentation was of low complexity (an acute and uncomplicated illness or injury).    The patient's evaluation involved:  ordering and/or review of 3+ test(s) in this encounter (see separate area of note for details)    The patient's management necessitated moderate risk (prescription drug management including medications given in the ED).        New Prescriptions    No medications on file       Final diagnoses:   Bacterial vaginosis - The wet prep was with white cells, and I did smell as light fishy odor - which can fit with BV.  use intravaginal flagyl and follow-up. await trichomonas, GC, chlamydia.  If persists then consider ureaplasma urylticum or mycoplasma genitalum treatment with or without testing - this is treated with oral doxycycline.  await GC, chlamydia and truch testing       7/11/2024   [facility] EMERGENCY DEPT       Adan Louis MD  07/11/24 [id number]

## 2024-07-11 NOTE — DISCHARGE INSTRUCTIONS
ICD-10-CM    1. Bacterial vaginosis  N76.0     B96.89     The wet prep was with white cells, and I did smell as light fishy odor - which can fit with BV.  use intravaginal flagyl and follow-up. await trichomonas, GC, chlamydia.  If persists then consider ureaplasma urylticum or mycoplasma genitalum treatment with or without testing - this is treated with oral doxycycline.  await GC, chlamydia and truch testing

## 2024-07-11 NOTE — ED TRIAGE NOTES
Comes to the ED today with vaginal itching, odor, and green discharge. Denies concern for STI's, but is concerned for BV. Reports a HX of BV.

## 2024-07-12 LAB
C TRACH DNA SPEC QL NAA+PROBE: NEGATIVE
N GONORRHOEA DNA SPEC QL NAA+PROBE: NEGATIVE

## 2024-07-16 DIAGNOSIS — J30.1 SEASONAL ALLERGIC RHINITIS DUE TO POLLEN: ICD-10-CM

## 2024-07-16 RX ORDER — LEVOCETIRIZINE DIHYDROCHLORIDE 5 MG/1
5 TABLET, FILM COATED ORAL EVERY EVENING
Qty: 90 TABLET | Refills: 1 | Status: SHIPPED | OUTPATIENT
Start: 2024-07-16

## 2024-07-16 NOTE — TELEPHONE ENCOUNTER
Requested Prescriptions   Pending Prescriptions Disp Refills    levocetirizine (XYZAL) 5 MG tablet 90 tablet 1     Sig: Take 1 tablet (5 mg) by mouth every evening       There is no refill protocol information for this order

## 2024-07-16 NOTE — TELEPHONE ENCOUNTER
Prescription approved per [facility] Refill Protocol.    Callie VELASQUEZ RN  Specialty/Allergy Clinics

## 2024-07-17 ENCOUNTER — NURSE TRIAGE (OUTPATIENT)
Dept: FAMILY MEDICINE | Facility: CLINIC | Age: 39
End: 2024-07-17
Payer: COMMERCIAL

## 2024-07-17 NOTE — TELEPHONE ENCOUNTER
"Reason for Disposition   SEVERE abdominal pain    Additional Information   Negative: Swelling of scrotum and has not previously been diagnosed with a hernia    Answer Assessment - Initial Assessment Questions  1. ONSET:  \"When did this first appear?\"      Many years ago  2. APPEARANCE: \"What does it look like?\"     Large umbilical hernia  3. SIZE: \"How big is it?\" (inches, cm or compare to coins, fruit)      unknown  4. LOCATION: \"Where exactly is the hernia located?\"      umbilical  5. PATTERN: \"Does the swelling come and go, or has it been constant since it started?\"      bulging  6. PAIN: \"Is there any pain?\" If Yes, ask: \"How bad is it?\"  (Scale 1-10; or mild, moderate, severe)     - MILD (1-3): Doesn't interfere with normal activities, abdomen soft and not tender to touch.      - MODERATE (4-7): Interferes with normal activities or awakens from sleep, abdomen tender to touch.      - SEVERE (8-10): Excruciating pain, doubled over, unable to do any normal activities.        8  7. DIAGNOSIS: \"Have you been seen by a doctor (or NP/PA) for this?\" \"Did the doctor diagnose you as having a hernia?\"      Previously has had pain and seen in er  8. OTHER SYMPTOMS: \"Do you have any other symptoms?\" (e.g., fever, abdomen pain, vomiting)      no  9. PREGNANCY: \"Is there any chance you are pregnant?\" \"When was your last menstrual period?\"      no    Protocols used: Hernia-A-[place]    "

## 2024-07-17 NOTE — TELEPHONE ENCOUNTER
Pt reports large umbilical hernia with extreme pain starting today. Has had hernia for many years. Has been seen before for same and no hx of strangulation.   Per protocol pt to go to ER. Pt was advised. She states she does not have ride and would have to ride her bike which would cause more pain. Advised pt she can call for ambulance. Pt states the pain has improved since she sat down and will come in tomorrow. Advised pt I would not recommend waiting and explained the risks of strangulation.  Pt states if she comes to ER would have no ride home and it would be night time so she will come in tomorrow.   Advised pt this is not recommended.  Again advised pt she can call for ambulance. Pt again states she will wait. Pt did agree that if she gets increased pain or worsening she will call 911 but feels like it is improving now.       Richard Russell RN

## 2024-07-18 ENCOUNTER — OFFICE VISIT (OUTPATIENT)
Dept: FAMILY MEDICINE | Facility: CLINIC | Age: 39
End: 2024-07-18
Payer: COMMERCIAL

## 2024-07-18 ENCOUNTER — MYC MEDICAL ADVICE (OUTPATIENT)
Dept: FAMILY MEDICINE | Facility: CLINIC | Age: 39
End: 2024-07-18

## 2024-07-18 VITALS
TEMPERATURE: 97.5 F | HEART RATE: 89 BPM | RESPIRATION RATE: 18 BRPM | DIASTOLIC BLOOD PRESSURE: 68 MMHG | BODY MASS INDEX: 36.9 KG/M2 | SYSTOLIC BLOOD PRESSURE: 100 MMHG | WEIGHT: 215 LBS | OXYGEN SATURATION: 100 %

## 2024-07-18 DIAGNOSIS — K42.9 UMBILICAL HERNIA WITHOUT OBSTRUCTION AND WITHOUT GANGRENE: Primary | ICD-10-CM

## 2024-07-18 PROCEDURE — 99213 OFFICE O/P EST LOW 20 MIN: CPT | Performed by: NURSE PRACTITIONER

## 2024-07-18 ASSESSMENT — PAIN SCALES - GENERAL: PAINLEVEL: NO PAIN (0)

## 2024-07-18 NOTE — PROGRESS NOTES
"  Assessment & Plan     Umbilical hernia without obstruction and without gangrene  Longstanding large umbilical hernia that is recently causing her an increased amount of pain.  Having normal bowel movements, no hematochezia, no melena.  She is interested in discussing repair with general surgery.  This referral is placed.  - Adult Gen Surg  Referral; Future          BMI  Estimated body mass index is 36.9 kg/m  as calculated from the following:    Height as of 7/11/24: 1.626 m (5' 4\").    Weight as of this encounter: 97.5 kg (215 lb).         See Patient Instructions    Subjective   Maddy is a 38 year old, presenting for the following health issues:  Hernia        7/18/2024    10:15 AM   Additional Questions   Roomed by Elizabeth PARK   Accompanied by self     HPI         Concern - check umbilical hernia  Onset: ongoing for a long time  Description: bulging hernia around belly button  Intensity: moderate  Progression of Symptoms:  worsening  Accompanying Signs & Symptoms: none  Previous history of similar problem: no  Precipitating factors:        Worsened by: laying down  Alleviating factors:        Improved by: nothing  Therapies tried and outcome: has lost some weight as she was told it would be more likely she could have surgery with weight loss      Review of Systems  Constitutional, HEENT, cardiovascular, pulmonary, gi and gu systems are negative, except as otherwise noted.      Objective    /68   Pulse 89   Temp 97.5  F (36.4  C) (Tympanic)   Resp 18   Wt 97.5 kg (215 lb)   SpO2 100%   BMI 36.90 kg/m    Body mass index is 36.9 kg/m .  Physical Exam  Vitals and nursing note reviewed.   Constitutional:       General: She is not in acute distress.     Appearance: Normal appearance.   HENT:      Head: Normocephalic and atraumatic.      Mouth/Throat:      Mouth: Mucous membranes are moist.   Cardiovascular:      Rate and Rhythm: Normal rate.   Pulmonary:      Effort: Pulmonary effort is normal. "   Abdominal:      Hernia: A hernia (Large umbilical) is present.   Musculoskeletal:      Cervical back: Neck supple.   Skin:     General: Skin is warm and dry.   Neurological:      General: No focal deficit present.      Mental Status: She is alert.   Psychiatric:         Mood and Affect: Mood normal.         Behavior: Behavior normal.                    Signed Electronically by: PHILL Martinez CNP

## 2024-07-19 ENCOUNTER — TRANSFERRED RECORDS (OUTPATIENT)
Dept: HEALTH INFORMATION MANAGEMENT | Facility: CLINIC | Age: 39
End: 2024-07-19
Payer: COMMERCIAL

## 2024-07-19 NOTE — TELEPHONE ENCOUNTER
RN notes that patient was seen in clinic yesterday after sending this MyChart and was evaluated/treated/referred, so closing encounter.    Chiquis Roe RN  [facility]

## 2024-07-20 ENCOUNTER — MYC MEDICAL ADVICE (OUTPATIENT)
Dept: FAMILY MEDICINE | Facility: CLINIC | Age: 39
End: 2024-07-20
Payer: COMMERCIAL

## 2024-07-22 ENCOUNTER — VIRTUAL VISIT (OUTPATIENT)
Dept: FAMILY MEDICINE | Facility: CLINIC | Age: 39
End: 2024-07-22
Payer: COMMERCIAL

## 2024-07-22 ENCOUNTER — LAB (OUTPATIENT)
Dept: LAB | Facility: CLINIC | Age: 39
End: 2024-07-22
Payer: COMMERCIAL

## 2024-07-22 DIAGNOSIS — R30.0 DYSURIA: ICD-10-CM

## 2024-07-22 DIAGNOSIS — N30.00 ACUTE CYSTITIS WITHOUT HEMATURIA: Primary | ICD-10-CM

## 2024-07-22 LAB
ALBUMIN UR-MCNC: NEGATIVE MG/DL
APPEARANCE UR: ABNORMAL
BACTERIA #/AREA URNS HPF: ABNORMAL /HPF
BILIRUB UR QL STRIP: NEGATIVE
COLOR UR AUTO: YELLOW
GLUCOSE UR STRIP-MCNC: NEGATIVE MG/DL
HGB UR QL STRIP: ABNORMAL
KETONES UR STRIP-MCNC: NEGATIVE MG/DL
LEUKOCYTE ESTERASE UR QL STRIP: ABNORMAL
MUCOUS THREADS #/AREA URNS LPF: PRESENT /LPF
NITRATE UR QL: NEGATIVE
PH UR STRIP: 6 [PH] (ref 5–7)
RBC #/AREA URNS AUTO: ABNORMAL /HPF
SP GR UR STRIP: 1.02 (ref 1–1.03)
SQUAMOUS #/AREA URNS AUTO: ABNORMAL /LPF
UROBILINOGEN UR STRIP-ACNC: 0.2 E.U./DL
WBC #/AREA URNS AUTO: ABNORMAL /HPF

## 2024-07-22 PROCEDURE — 99213 OFFICE O/P EST LOW 20 MIN: CPT | Mod: 95 | Performed by: NURSE PRACTITIONER

## 2024-07-22 PROCEDURE — 81001 URINALYSIS AUTO W/SCOPE: CPT

## 2024-07-22 PROCEDURE — 87086 URINE CULTURE/COLONY COUNT: CPT

## 2024-07-22 PROCEDURE — 87186 SC STD MICRODIL/AGAR DIL: CPT

## 2024-07-22 PROCEDURE — 87088 URINE BACTERIA CULTURE: CPT

## 2024-07-22 RX ORDER — NITROFURANTOIN 25; 75 MG/1; MG/1
100 CAPSULE ORAL 2 TIMES DAILY
Qty: 14 CAPSULE | Refills: 0 | Status: SHIPPED | OUTPATIENT
Start: 2024-07-22 | End: 2024-07-29

## 2024-07-22 ASSESSMENT — PATIENT HEALTH QUESTIONNAIRE - PHQ9: SUM OF ALL RESPONSES TO PHQ QUESTIONS 1-9: 5

## 2024-07-22 NOTE — PROGRESS NOTES
Maddy is a 38 year old who is being evaluated via a billable video visit.    How would you like to obtain your AVS? MyChart  If the video visit is dropped, the invitation should be resent by: Text to cell phone: [phone]  Will anyone else be joining your video visit? No          Assessment & Plan     Acute cystitis without hematuria  Treat with:  - nitroFURantoin macrocrystal-monohydrate (MACROBID) 100 MG capsule; Take 1 capsule (100 mg) by mouth 2 times daily for 7 days    Dysuria  - UA Macroscopic with reflex to Microscopic and Culture - Lab Collect; Future  - nitroFURantoin macrocrystal-monohydrate (MACROBID) 100 MG capsule; Take 1 capsule (100 mg) by mouth 2 times daily for 7 days      1. Avoid a full bladder.  2. Increase fluid intake to 8 glasses of water daily. Avoid caffienated beverages.  3. Urinate after intercourse.  4. Return to clinic if you develop fever, abdominal pain, nausea or vomiting, blood in your urine, or if symptoms do not improve with antibiotics.   5. Complete full course of antibiotics as prescribed.      The risks, benefits and treatment options of prescribed medications or other treatments have been discussed with the patient. The patient verbalized their understanding and should call or follow up if no improvement or if they develop further problems.  Shirley Freeman, OLGA                    Subjective   Maddy is a 38 year old, presenting for the following health issues:  Urinary Problem        7/22/2024     8:40 AM   Additional Questions   Roomed by rmb   Accompanied by self         7/22/2024     8:40 AM   Patient Reported Additional Medications   Patient reports taking the following new medications none     Video Start Time: 4:48 PM    HPI     Genitourinary - Female  Onset/Duration: last friday  Description:   Painful urination (Dysuria): YES           Frequency: YES  Blood in urine (Hematuria): No  Delay in urine (Hesitency): No  Intensity: mild  Progression of Symptoms:   "same  Accompanying Signs & Symptoms:  Fever/chills: No  Flank pain: No  Nausea and vomiting: No  Vaginal symptoms: none  Abdominal/Pelvic Pain: No  History:   History of frequent UTI s: YES  History of kidney stones: N/A  Sexually Active: No  Possibility of pregnancy: No  Precipitating or alleviating factors: advil  Therapies tried and outcome: Increase fluid intake and OTC advil or tylenol         Review of Systems  Constitutional, HEENT, cardiovascular, pulmonary, gi and gu systems are negative, except as otherwise noted.      Objective    Vitals - Patient Reported  Systolic (Patient Reported): 100  Diastolic (Patient Reported): 65  Weight (Patient Reported): 52.2 kg (115 lb)  Height (Patient Reported): 160 cm (5' 3\")  BMI (Based on Pt Reported Ht/Wt): 20.37  Pain Score: Mild Pain (2)  Pain Loc: Other - see comment        Physical Exam   GENERAL: alert and no distress  EYES: Eyes grossly normal to inspection.  No discharge or erythema, or obvious scleral/conjunctival abnormalities.  RESP: No audible wheeze, cough, or visible cyanosis.    SKIN: Visible skin clear. No significant rash, abnormal pigmentation or lesions.  NEURO: Cranial nerves grossly intact.  Mentation and speech appropriate for age.  PSYCH: Appropriate affect, tone, and pace of words    Results for orders placed or performed in visit on 07/22/24 (from the past 24 hour(s))   UA Macroscopic with reflex to Microscopic and Culture - Lab Collect    Specimen: Urine, Clean Catch   Result Value Ref Range    Color Urine Yellow Colorless, Straw, Light Yellow, Yellow    Appearance Urine Slightly Cloudy (A) Clear    Glucose Urine Negative Negative mg/dL    Bilirubin Urine Negative Negative    Ketones Urine Negative Negative mg/dL    Specific Gravity Urine 1.025 1.003 - 1.035    Blood Urine Trace (A) Negative    pH Urine 6.0 5.0 - 7.0    Protein Albumin Urine Negative Negative mg/dL    Urobilinogen Urine 0.2 0.2, 1.0 E.U./dL    Nitrite Urine Negative Negative    " Leukocyte Esterase Urine Moderate (A) Negative   Urine Microscopic Exam   Result Value Ref Range    Bacteria Urine Few (A) None Seen /HPF    RBC Urine 0-2 0-2 /HPF /HPF    WBC Urine 10-25 (A) 0-5 /HPF /HPF    Squamous Epithelials Urine Few (A) None Seen /LPF    Mucus Urine Present (A) None Seen /LPF     *Note: Due to a large number of results and/or encounters for the requested time period, some results have not been displayed. A complete set of results can be found in Results Review.         Video-Visit Details    Type of service:  Video Visit   Video End Time:4:51 PM  Originating Location (pt. Location): Home    Distant Location (provider location):  On-site  Platform used for Video Visit: Perri  Signed Electronically by: PHILL Luo CNP

## 2024-07-23 LAB — BACTERIA UR CULT: ABNORMAL

## 2024-07-24 DIAGNOSIS — N30.00 ACUTE CYSTITIS WITHOUT HEMATURIA: Primary | ICD-10-CM

## 2024-07-24 RX ORDER — SULFAMETHOXAZOLE/TRIMETHOPRIM 800-160 MG
1 TABLET ORAL 2 TIMES DAILY
Qty: 10 TABLET | Refills: 0 | Status: SHIPPED | OUTPATIENT
Start: 2024-07-24 | End: 2024-07-29

## 2024-07-30 ENCOUNTER — TRANSFERRED RECORDS (OUTPATIENT)
Dept: HEALTH INFORMATION MANAGEMENT | Facility: CLINIC | Age: 39
End: 2024-07-30
Payer: COMMERCIAL

## 2024-08-02 ENCOUNTER — OFFICE VISIT (OUTPATIENT)
Dept: FAMILY MEDICINE | Facility: CLINIC | Age: 39
End: 2024-08-02
Payer: COMMERCIAL

## 2024-08-02 ENCOUNTER — TELEPHONE (OUTPATIENT)
Dept: FAMILY MEDICINE | Facility: CLINIC | Age: 39
End: 2024-08-02

## 2024-08-02 VITALS
HEIGHT: 65 IN | HEART RATE: 81 BPM | DIASTOLIC BLOOD PRESSURE: 82 MMHG | OXYGEN SATURATION: 97 % | RESPIRATION RATE: 12 BRPM | TEMPERATURE: 98.7 F | SYSTOLIC BLOOD PRESSURE: 110 MMHG | WEIGHT: 217 LBS | BODY MASS INDEX: 36.15 KG/M2

## 2024-08-02 DIAGNOSIS — R41.3 MEMORY PROBLEM: ICD-10-CM

## 2024-08-02 DIAGNOSIS — Z00.00 ENCOUNTER FOR MEDICARE ANNUAL WELLNESS EXAM: Primary | ICD-10-CM

## 2024-08-02 PROCEDURE — G0439 PPPS, SUBSEQ VISIT: HCPCS | Performed by: NURSE PRACTITIONER

## 2024-08-02 PROCEDURE — G0009 ADMIN PNEUMOCOCCAL VACCINE: HCPCS | Performed by: NURSE PRACTITIONER

## 2024-08-02 PROCEDURE — 90677 PCV20 VACCINE IM: CPT | Performed by: NURSE PRACTITIONER

## 2024-08-02 SDOH — HEALTH STABILITY: PHYSICAL HEALTH: ON AVERAGE, HOW MANY MINUTES DO YOU ENGAGE IN EXERCISE AT THIS LEVEL?: 60 MIN

## 2024-08-02 SDOH — HEALTH STABILITY: PHYSICAL HEALTH: ON AVERAGE, HOW MANY DAYS PER WEEK DO YOU ENGAGE IN MODERATE TO STRENUOUS EXERCISE (LIKE A BRISK WALK)?: 4 DAYS

## 2024-08-02 ASSESSMENT — PATIENT HEALTH QUESTIONNAIRE - PHQ9
10. IF YOU CHECKED OFF ANY PROBLEMS, HOW DIFFICULT HAVE THESE PROBLEMS MADE IT FOR YOU TO DO YOUR WORK, TAKE CARE OF THINGS AT HOME, OR GET ALONG WITH OTHER PEOPLE: SOMEWHAT DIFFICULT
SUM OF ALL RESPONSES TO PHQ QUESTIONS 1-9: 9
SUM OF ALL RESPONSES TO PHQ QUESTIONS 1-9: 9

## 2024-08-02 ASSESSMENT — PAIN SCALES - GENERAL: PAINLEVEL: NO PAIN (0)

## 2024-08-02 ASSESSMENT — SOCIAL DETERMINANTS OF HEALTH (SDOH): HOW OFTEN DO YOU GET TOGETHER WITH FRIENDS OR RELATIVES?: ONCE A WEEK

## 2024-08-02 NOTE — PATIENT INSTRUCTIONS
Patient Education   Preventive Care Advice   This is general advice given by our system to help you stay healthy. However, your care team may have specific advice just for you. Please talk to your care team about your preventive care needs.  Nutrition  Eat 5 or more servings of fruits and vegetables each day.  Try wheat bread, brown rice and whole grain pasta (instead of white bread, rice, and pasta).  Get enough calcium and vitamin D. Check the label on foods and aim for 100% of the RDA (recommended daily allowance).  Lifestyle  Exercise at least 150 minutes each week  (30 minutes a day, 5 days a week).  Do muscle strengthening activities 2 days a week. These help control your weight and prevent disease.  No smoking.  Wear sunscreen to prevent skin cancer.  Have a dental exam and cleaning every 6 months.  Yearly exams  See your health care team every year to talk about:  Any changes in your health.  Any medicines your care team has prescribed.  Preventive care, family planning, and ways to prevent chronic diseases.  Shots (vaccines)   HPV shots (up to age 26), if you've never had them before.  Hepatitis B shots (up to age 59), if you've never had them before.  COVID-19 shot: Get this shot when it's due.  Flu shot: Get a flu shot every year.  Tetanus shot: Get a tetanus shot every 10 years.  Pneumococcal, hepatitis A, and RSV shots: Ask your care team if you need these based on your risk.  Shingles shot (for age 50 and up)  General health tests  Diabetes screening:  Starting at age 35, Get screened for diabetes at least every 3 years.  If you are younger than age 35, ask your care team if you should be screened for diabetes.  Cholesterol test: At age 39, start having a cholesterol test every 5 years, or more often if advised.  Bone density scan (DEXA): At age 50, ask your care team if you should have this scan for osteoporosis (brittle bones).  Hepatitis C: Get tested at least once in your life.  STIs (sexually  transmitted infections)  Before age 24: Ask your care team if you should be screened for STIs.  After age 24: Get screened for STIs if you're at risk. You are at risk for STIs (including HIV) if:  You are sexually active with more than one person.  You don't use condoms every time.  You or a partner was diagnosed with a sexually transmitted infection.  If you are at risk for HIV, ask about PrEP medicine to prevent HIV.  Get tested for HIV at least once in your life, whether you are at risk for HIV or not.  Cancer screening tests  Cervical cancer screening: If you have a cervix, begin getting regular cervical cancer screening tests starting at age 21.  Breast cancer scan (mammogram): If you've ever had breasts, begin having regular mammograms starting at age 40. This is a scan to check for breast cancer.  Colon cancer screening: It is important to start screening for colon cancer at age 45.  Have a colonoscopy test every 10 years (or more often if you're at risk) Or, ask your provider about stool tests like a FIT test every year or Cologuard test every 3 years.  To learn more about your testing options, visit:   .  For help making a decision, visit:   https://bit.ly/ux55209.  Prostate cancer screening test: If you have a prostate, ask your care team if a prostate cancer screening test (PSA) at age 55 is right for you.  Lung cancer screening: If you are a current or former smoker ages 50 to 80, ask your care team if ongoing lung cancer screenings are right for you.  For informational purposes only. Not to replace the advice of your health care provider. Copyright   2023 [facility] [employer]. All rights reserved. Clinically reviewed by the [facility] Transitions Program. Press Play [id number] - REV 01/24.  Bladder Training: Care Instructions  Your Care Instructions     Bladder training is used to treat urge incontinence and stress incontinence. Urge incontinence means that the need to urinate comes on so fast  that you can't get to a toilet in time. Stress incontinence means that you leak urine because of pressure on your bladder. For example, it may happen when you laugh, cough, or lift something heavy.  Bladder training can increase how long you can wait before you have to urinate. It can also help your bladder hold more urine. And it can give you better control over the urge to urinate.  It is important to remember that bladder training takes a few weeks to a few months to make a difference. You may not see results right away, but don't give up.  Follow-up care is a key part of your treatment and safety. Be sure to make and go to all appointments, and call your doctor if you are having problems. It's also a good idea to know your test results and keep a list of the medicines you take.  How can you care for yourself at home?  Work with your doctor to come up with a bladder training program that is right for you. You may use one or more of the following methods.  Delayed urination  In the beginning, try to keep from urinating for 5 minutes after you first feel the need to go.  While you wait, take deep, slow breaths to relax. Kegel exercises can also help you delay the need to go to the bathroom.  After some practice, when you can easily wait 5 minutes to urinate, try to wait 10 minutes before you urinate.  Slowly increase the waiting period until you are able to control when you have to urinate.  Scheduled urination  Empty your bladder when you first wake up in the morning.  Schedule times throughout the day when you will urinate.  Start by going to the bathroom every hour, even if you don't need to go.  Slowly increase the time between trips to the bathroom.  When you have found a schedule that works well for you, keep doing it.  If you wake up during the night and have to urinate, do it. Apply your schedule to waking hours only.  Kegel exercises  These tighten and strengthen pelvic muscles, which can help you control  "the flow of urine. (If doing these exercises causes pain, stop doing them and talk with your doctor.) To do Kegel exercises:  Squeeze your muscles as if you were trying not to pass gas. Or squeeze your muscles as if you were stopping the flow of urine. Your belly, legs, and buttocks shouldn't move.  Hold the squeeze for 3 seconds, then relax for 5 to 10 seconds.  Start with 3 seconds, then add 1 second each week until you are able to squeeze for 10 seconds.  Repeat the exercise 10 times a session. Do 3 to 8 sessions a day.  When should you call for help?  Watch closely for changes in your health, and be sure to contact your doctor if:    Your incontinence is getting worse.     You do not get better as expected.   Where can you learn more?  Go to https://www.[employer].net/patiented  Enter V684 in the search box to learn more about \"Bladder Training: Care Instructions.\"  Current as of: November 15, 2023               Content Version: 14.0    5020-4730 [employer]   Care instructions adapted under license by your healthcare professional. If you have questions about a medical condition or this instruction, always ask your healthcare professional. [employer] disclaims any warranty or liability for your use of this information.      Learning About Depression Screening  What is depression screening?  Depression screening is a way to see if you have depression symptoms. It may be done by a doctor or counselor. It's often part of a routine checkup. That's because your mental health is just as important as your physical health.  Depression is a mental health condition that affects how you feel, think, and act. You may:  Have less energy.  Lose interest in your daily activities.  Feel sad and grouchy for a long time.  Depression is very common. It affects people of all ages.  Many things can lead to depression. Some people become depressed after they have a stroke or find out they have a major " "illness like cancer or heart disease. The death of a loved one or a breakup may lead to depression. It can run in families. Most experts believe that a combination of inherited genes and stressful life events can cause it.  What happens during screening?  You may be asked to fill out a form about your depression symptoms. You and the doctor will discuss your answers. The doctor may ask you more questions to learn more about how you think, act, and feel.  What happens after screening?  If you have symptoms of depression, your doctor will talk to you about your options.  Doctors usually treat depression with medicines or counseling. Often, combining the two works best. Many people don't get help because they think that they'll get over the depression on their own. But people with depression may not get better unless they get treatment.  The cause of depression is not well understood. There may be many factors involved. But if you have depression, it's not your fault.  A serious symptom of depression is thinking about death or suicide. If you or someone you care about talks about this or about feeling hopeless, get help right away.  It's important to know that depression can be treated. Medicine, counseling, and self-care may help.  Where can you learn more?  Go to https://www.[employer].net/patiented  Enter T185 in the search box to learn more about \"Learning About Depression Screening.\"  Current as of: June 24, 2023  Content Version: 14.1 2006-2024 [employer].   Care instructions adapted under license by your healthcare professional. If you have questions about a medical condition or this instruction, always ask your healthcare professional. [employer] disclaims any warranty or liability for your use of this information.       "

## 2024-08-02 NOTE — TELEPHONE ENCOUNTER
Please inform patient that I received this notice about the neuropsych referral I placed today.  She can make an appointment with the providers listed below.  If she needs a referral to see these providers, clinic staff may fax the referral I already signed.  Shirley Freeman, CNP

## 2024-08-02 NOTE — NURSING NOTE
Prior to immunization administration, verified patients identity using patient s name and date of birth. Please see Immunization Activity for additional information.     Screening Questionnaire for Adult Immunization    Are you sick today?   No   Do you have allergies to medications, food, a vaccine component or latex?   No   Have you ever had a serious reaction after receiving a vaccination?   No   Do you have a long-term health problem with heart, lung, kidney, or metabolic disease (e.g., diabetes), asthma, a blood disorder, no spleen, complement component deficiency, a cochlear implant, or a spinal fluid leak?  Are you on long-term aspirin therapy?   Yes   Do you have cancer, leukemia, HIV/AIDS, or any other immune system problem?   No   Do you have a parent, brother, or sister with an immune system problem?   Don't Know   In the past 3 months, have you taken medications that affect  your immune system, such as prednisone, other steroids, or anticancer drugs; drugs for the treatment of rheumatoid arthritis, Crohn s disease, or psoriasis; or have you had radiation treatments?   No   Have you had a seizure, or a brain or other nervous system problem?   No   During the past year, have you received a transfusion of blood or blood    products, or been given immune (gamma) globulin or antiviral drug?   No   For women: Are you pregnant or is there a chance you could become       pregnant during the next month?   No   Have you received any vaccinations in the past 4 weeks?   No     Immunization questionnaire was positive for at least one answer.  Notified provider.      Patient instructed to remain in clinic for 15 minutes afterwards, and to report any adverse reactions.     Screening performed by Susanne Briseno CMA on 8/2/2024 at 11:36 AM.

## 2024-08-02 NOTE — PROGRESS NOTES
"Preventive Care Visit  [facility]  PHILL Luo CNP, Family Medicine  Aug 2, 2024        Assessment & Plan     Encounter for Medicare annual wellness exam    Memory problem  - Adult Neuropsychology  Referral; Future            BMI  Estimated body mass index is 36.11 kg/m  as calculated from the following:    Height as of this encounter: 1.651 m (5' 5\").    Weight as of this encounter: 98.4 kg (217 lb).   Weight management plan: Discussed healthy diet and exercise guidelines    Counseling  Appropriate preventive services were addressed with this patient via screening, questionnaire, or discussion as appropriate for fall prevention, nutrition, physical activity, Tobacco-use cessation, weight loss and cognition.  Checklist reviewing preventive services available has been given to the patient.  Reviewed patient's diet, addressing concerns and/or questions.   Updated plan of care.  Patient reported difficulty with activities of daily living were addressed today.Information on urinary incontinence and treatment options given to patient.   The patient's PHQ-9 score is consistent with mild depression. She was provided with information regarding depression.               Terry Castañeda is a 38 year old, presenting for the following:  Wellness Visit and Patient Request (Neuropsych referral for memory loss)        8/2/2024    11:07 AM   Additional Questions   Roomed by Susanne PINEDA         8/2/2024    11:07 AM   Patient Reported Additional Medications   Patient reports taking the following new medications none         Health Care Directive  Patient does not have a Health Care Directive or Living Will: Discussed advance care planning with patient; however, patient declined at this time.    HPI  Concerned about memory  Patient reports she forgets things quite a lot  Like if she goes grocery shopping- will have to go back several times.  Will forget keys.    Doesn't remember what " doctors tell her- feels frustrated with this.      Wt Readings from Last 4 Encounters:   08/02/24 98.4 kg (217 lb)   07/18/24 97.5 kg (215 lb)   07/11/24 99.8 kg (220 lb)   06/12/24 99.8 kg (220 lb)               8/2/2024   General Health   How would you rate your overall physical health? (!) FAIR   Feel stress (tense, anxious, or unable to sleep) Not at all            8/2/2024   Nutrition   Diet: Regular (no restrictions)            8/2/2024   Exercise   Days per week of moderate/strenous exercise 4 days   Average minutes spent exercising at this level 60 min            8/2/2024   Social Factors   Frequency of gathering with friends or relatives Once a week   Worry food won't last until get money to buy more No   Food not last or not have enough money for food? No   Do you have housing? (Housing is defined as stable permanent housing and does not include staying ouside in a car, in a tent, in an abandoned building, in an overnight shelter, or couch-surfing.) Yes   Are you worried about losing your housing? Yes   Lack of transportation? No   Unable to get utilities (heat,electricity)? No   Want help with housing or utility concern? No      (!) HOUSING CONCERN PRESENT      8/2/2024   Fall Risk   Fallen 2 or more times in the past year? No   Trouble with walking or balance? No             8/2/2024   Activities of Daily Living- Home Safety   Needs help with the following daily activites Transportation   Safety concerns in the home None of the above            8/2/2024   Dental   Dentist two times every year? Yes            8/2/2024   Hearing Screening   Hearing concerns? None of the above            8/2/2024   Driving Risk Screening   Patient/family members have concerns about driving (!) DECLINE            8/2/2024   General Alertness/Fatigue Screening   Have you been more tired than usual lately? No            8/2/2024   Urinary Incontinence Screening   Bothered by leaking urine in past 6 months Yes             8/2/2024   TB Screening   Were you born outside of the [place]? Yes          Today's PHQ-9 Score:       8/2/2024    11:01 AM   PHQ-9 SCORE   PHQ-9 Total Score MyChart 9 (Mild depression)   PHQ-9 Total Score 9         8/2/2024   Substance Use   Alcohol more than 3/day or more than 7/wk No   Do you have a current opioid prescription? No   How severe/bad is pain from 1 to 10? 0/10 (No Pain)   Do you use any other substances recreationally? No        Social History     Tobacco Use    Smoking status: Former     Current packs/day: 0.50     Types: Cigarettes    Smokeless tobacco: Former    Tobacco comments:     around 2nd hand smoke   Vaping Use    Vaping status: Never Used   Substance Use Topics    Alcohol use: Not Currently    Drug use: Not Currently                History of abnormal Pap smear: No - age 30- 64 PAP with HPV every 5 years recommended        Latest Ref Rng & Units 5/19/2021     1:45 PM 5/19/2021     1:39 PM 3/27/2018    12:00 AM   PAP / HPV   PAP (Historical)   NIL     HPV 16 DNA NEG^Negative Negative      HPV 18 DNA NEG^Negative Negative      Other HR HPV NEG^Negative Negative      PAP-ABSTRACT    See Scanned Document           This result is from an external source.           8/2/2024   Contraception/Family Planning   Questions about contraception or family planning No            Reviewed and updated as needed this visit by Provider                      Current providers sharing in care for this patient include:  Patient Care Team:  Shirley Freeman APRN CNP as PCP - General (Nurse Practitioner)  Christie as ARM worker  Eileen Stone  as  (Licensed Mental Health)  [facility] as Occupational Therapist (Licensed Mental Health)  Aggie Lehman MD as MD (INTERNAL MEDICINE - ENDOCRINOLOGY, DIABETES & METABOLISM)  Shirley Freeman APRN CNP as Assigned PCP  Alexander Montemayor MD as MD (Psychiatry)  [facility] as Therapist (Licensed Mental  "Health)  Rudy Shin MD as Assigned Allergy Provider  Anna Hampton RD as Diabetes Educator (Dietitian, Registered)  Elías Montero MD as Surgeon (Surgery)  Shirley Gooden APRN CNP (Psychiatry)  Angeli Domingo RD as Registered Dietitian (Dietitian, Registered)  Shannon Mathias MD as Assigned Surgical Provider  Pam Cloud MD as MD (OB/Gyn)  Kathryn Shepard MD as Assigned OBGYN Provider    The following health maintenance items are reviewed in Epic and correct as of today:  Health Maintenance   Topic Date Due    Pneumococcal Vaccine: Pediatrics (0 to 5 Years) and At-Risk Patients (6 to 64 Years) (1 of 2 - PCV) Never done    HEPATITIS B IMMUNIZATION (2 of 3 - 3-dose series) 03/16/1998    HPV IMMUNIZATION (3 - 3-dose series) 11/29/2011    COVID-19 Vaccine (5 - 2023-24 season) 09/01/2023    MEDICARE ANNUAL WELLNESS VISIT  10/14/2023    DIABETIC FOOT EXAM  10/14/2023    EYE EXAM  08/04/2024    INFLUENZA VACCINE (1) 09/01/2024    A1C  09/28/2024    TSH W/FREE T4 REFLEX  10/18/2024    LIPID  12/06/2024    ANNUAL REVIEW OF HM ORDERS  04/09/2025    BMP  06/08/2025    MICROALBUMIN  06/28/2025    HPV TEST  05/19/2026    PAP  05/19/2026    ADVANCE CARE PLANNING  10/14/2027    DTAP/TDAP/TD IMMUNIZATION (11 - Td or Tdap) 07/06/2033    HEPATITIS C SCREENING  Completed    HIV SCREENING  Completed    IPV IMMUNIZATION  Completed    MENINGITIS IMMUNIZATION  Aged Out    RSV MONOCLONAL ANTIBODY  Aged Out         Review of Systems  Constitutional, HEENT, cardiovascular, pulmonary, GI, , musculoskeletal, neuro, skin, endocrine and psych systems are negative, except as otherwise noted.         Objective    Exam  /82 (BP Location: Right arm, Patient Position: Sitting, Cuff Size: Adult Large)   Pulse 81   Temp 98.7  F (37.1  C) (Tympanic)   Resp 12   Ht 1.651 m (5' 5\")   Wt 98.4 kg (217 lb)   LMP  (LMP Unknown)   SpO2 97%   BMI 36.11 kg/m     Estimated body mass index is 36.11 " "kg/m  as calculated from the following:    Height as of this encounter: 1.651 m (5' 5\").    Weight as of this encounter: 98.4 kg (217 lb).    Physical Exam  GENERAL: alert and no distress  HENT: ear canals and TM's normal, nose and mouth without ulcers or lesions  NECK: no adenopathy, no asymmetry, masses, or scars  RESP: lungs clear to auscultation - no rales, rhonchi or wheezes  CV: regular rate and rhythm, normal S1 S2, no S3 or S4, no murmur, click or rub, no peripheral edema  MS: no gross musculoskeletal defects noted, no edema  Diabetic foot exam: normal DP and PT pulses, no trophic changes or ulcerative lesions, normal sensory exam, and normal monofilament exam        8/2/2024   Mini Cog   Clock Draw Score 2 Normal   3 Item Recall 3 objects recalled   Mini Cog Total Score 5                 Signed Electronically by: PHILL Luo CNP    "

## 2024-08-02 NOTE — TELEPHONE ENCOUNTER
----- Message from Geeta VELASQUEZ sent at 8/2/2024  2:56 PM CDT -----  Regarding: Neuropsychology Referral  Dear Dr. Shirley Freeman,    Thank you for your referral to the [facility] Adult Neuropsychology clinic. Unfortunately, due to recent staffing shortages we have had to limit the number of referrals we can accept. Until further notice, we recommend all patients under the age of 60 who do not have an identified or suspected neurological condition be referred to external neuropsychologists. We apologize for the inconvenience.    There are several community neuropsychologists that we might suggest, including:   Dr. Livier Adams - [phone]  Dr. Yusuf Briseno - [phone]   Dr. Batool Pratt - [phone]   Dr. Yesenia Olvera - [phone]   Dr. Gin Naranjo - [phone]   Dr. Elvira Greer - [phone], [email or web address]  Dr. Mark Hillman - [phone], [email or web address]   [facility] - [phone], [email or web address]   [facility], [email or web address]  [facility] - [phone], [email or web address]     Sincerely,  Geeta Dallas  Psychometrist

## 2024-08-07 ENCOUNTER — TRANSFERRED RECORDS (OUTPATIENT)
Dept: MULTI SPECIALTY CLINIC | Facility: CLINIC | Age: 39
End: 2024-08-07
Payer: COMMERCIAL

## 2024-08-07 LAB — RETINOPATHY: NEGATIVE

## 2024-08-08 ENCOUNTER — VIRTUAL VISIT (OUTPATIENT)
Dept: SURGERY | Facility: CLINIC | Age: 39
End: 2024-08-08
Payer: COMMERCIAL

## 2024-08-08 DIAGNOSIS — E11.9 TYPE 2 DIABETES MELLITUS WITHOUT COMPLICATION, WITHOUT LONG-TERM CURRENT USE OF INSULIN (H): Primary | ICD-10-CM

## 2024-08-08 DIAGNOSIS — E66.9 OBESITY (BMI 30-39.9): ICD-10-CM

## 2024-08-08 PROCEDURE — 97803 MED NUTRITION INDIV SUBSEQ: CPT | Mod: 95 | Performed by: DIETITIAN, REGISTERED

## 2024-08-08 NOTE — LETTER
8/8/2024      Maddy Ortiz  [address]  [place] [address]      Dear Colleague,    Thank you for referring your patient, Maddy Ortiz, to the [facility] [place]. Please see a copy of my visit note below.    Maddy Ortiz is a 38 year old who is being evaluated via a billable video visit.      How would you like to obtain your AVS? MyChart  If the video visit is dropped, the invitation should be resent by: Text to cell phone: [phone]  Will anyone else be joining your video visit? No        Medical  Weight Loss Follow-Up Diet Evaluation  Assessment:  Maddy is presenting today for a follow up weight management nutrition consultation.  This patient has had an initial appointment and was referred by Dr. Serna for MNT as treatment for Obesity   Weight loss medication:  Victoza .   Pt's weight is 223 lbs  Initial weight: 202 lbs  Weight change: +21 lbs        6/12/2024     1:20 PM   Changes and Difficulties   I have made the following changes to my diet since my last visit: eating only Mom's Meals   With regards to my diet, I am still struggling with: Eating this food adds to my depression   I have made the following changes to my activity/exercise since my last visit: Biking to send letters   With regards to my activity/exercise, I am still struggling with: I cannot afford food     BMI: 37.2  Ideal body weight: 57 kg (125 lb 10.6 oz)  Adjusted ideal body weight: 73.6 kg (162 lb 3.2 oz)    Estimated RMR ([place]-St Jeor equation):   1,727 kcals x 1.2 (sedentary) = 2,072 kcals (for weight maintenance)  Recommended Protein Intake: 60-80 grams of protein/day  Patient Active Problem List:  Patient Active Problem List   Diagnosis     Animal dander allergy     CARDIOVASCULAR SCREENING; LDL GOAL LESS THAN 160     Psychosis (H)     Paranoid type delusional disorder (H)     Bipolar 1 disorder (H)     Insomnia     Depression with anxiety     Seasonal allergic  rhinitis due to pollen     Allergic rhinitis due to mold     Allergic rhinitis due to animal dander     Allergic rhinitis due to dust mite     Encounter for IUD insertion     Schizoaffective disorder, bipolar type (H)     Gastroesophageal reflux disease without esophagitis     Paranoia (psychosis) (H)     Morbid obesity (H)     Schizoaffective disorder (H)     Umbilical hernia without obstruction and without gangrene     Fatty liver     Type 2 diabetes mellitus without complication, without long-term current use of insulin (H)     Elevated LFTs     Disorganized behavior     Infection due to 2019 novel coronavirus     Polypharmacy     Sedated due to multiple medications     Overdose, undetermined intent, initial encounter     Segmental and somatic dysfunction     SI (sacroiliac) joint dysfunction     mirena IUD 9/30/22     Neck pain     Hip pain, right     Chronic pain of both knees     Difficulty communicating     Difficulty using pragmatics in communication   Diabetes: T2DM, A1C of 5.1% on 6/28/2024    Progress on goals from last visit: Patient stated that she is very tired. Patient stated that she discharge Mom's Meals d/t needing to cleaning the foils on her fridge. Patient has been more consistent with three meals per day. Patient has been trying to add in a serving of fruits and/or vegetables daily. Patient has been biking or walking daily. Patient stated that she is hoping she will be able to have hernia surgery.  Goals:  Eat breakfast daily/3 meals per day  Continue to aim to have a fruit/veggie daily. Incorporate fresh, frozen, canned fruits and veggies with meals   Stay consistent with activity - walking     Dietary Recall:  Breakfast (8-10 AM): mini wheats  Lunch (12 PM): sandwiches (ham/turkey with cheese)  Dinner (5-7 PM): sandwiches (ham/turkey with cheese)  Typical snacks: none  Exercise:   Patient has been walking and biking - almost every day  Nutrition Diagnosis:    Obesity related to overeating and  poor lifestyle habits as evidenced by patient's report of limited budget, inconsistent meals, large portions, frequent snacking of sweets, lack of activity and BMI 37.2      Intervention:  Food and/or nutrient delivery: stay consistent with three meals. Try adding in a fruit and/or vegetable each day. Stay consistent with exercise routine.   Nutrition education: no resources requested or provided  Nutrition counseling: provided support for continued improvements    Monitoring/Evaluation:    Goals:  Eat breakfast daily/3 meals per day  Continue to aim to have a fruit/veggie daily. Incorporate fresh, frozen, canned fruits and veggies with meals   Stay consistent with activity - walking and biking      Patient to follow up in 2 month(s) with bariatrician and 4 month(s) with RD      Video-Visit Details    Type of service:  Video Visit    Video Start Time (time video started): 8:32 AM    Video End Time (time video stopped): 8:41 AM    Originating Location (pt. Location): Home      Distant Location (provider location):  Off-site    Mode of Communication:  Video Conference via [facility]    Physician has received verbal consent for a Video Visit from the patient? Yes      Karly Baca RD           Again, thank you for allowing me to participate in the care of your patient.        Sincerely,        Karly Baca RD

## 2024-08-08 NOTE — PROGRESS NOTES
Maddy Ortiz is a 38 year old who is being evaluated via a billable video visit.      How would you like to obtain your AVS? MyChart  If the video visit is dropped, the invitation should be resent by: Text to cell phone: [phone]  Will anyone else be joining your video visit? No        Medical  Weight Loss Follow-Up Diet Evaluation  Assessment:  Maddy is presenting today for a follow up weight management nutrition consultation.  This patient has had an initial appointment and was referred by Dr. Serna for MNT as treatment for Obesity   Weight loss medication:  Victoza .   Pt's weight is 223 lbs  Initial weight: 202 lbs  Weight change: +21 lbs        6/12/2024     1:20 PM   Changes and Difficulties   I have made the following changes to my diet since my last visit: eating only Mom's Meals   With regards to my diet, I am still struggling with: Eating this food adds to my depression   I have made the following changes to my activity/exercise since my last visit: Biking to send letters   With regards to my activity/exercise, I am still struggling with: I cannot afford food     BMI: 37.2  Ideal body weight: 57 kg (125 lb 10.6 oz)  Adjusted ideal body weight: 73.6 kg (162 lb 3.2 oz)    Estimated RMR ([place]-St Jeor equation):   1,727 kcals x 1.2 (sedentary) = 2,072 kcals (for weight maintenance)  Recommended Protein Intake: 60-80 grams of protein/day  Patient Active Problem List:  Patient Active Problem List   Diagnosis    Animal dander allergy    CARDIOVASCULAR SCREENING; LDL GOAL LESS THAN 160    Psychosis (H)    Paranoid type delusional disorder (H)    Bipolar 1 disorder (H)    Insomnia    Depression with anxiety    Seasonal allergic rhinitis due to pollen    Allergic rhinitis due to mold    Allergic rhinitis due to animal dander    Allergic rhinitis due to dust mite    Encounter for IUD insertion    Schizoaffective disorder, bipolar type (H)    Gastroesophageal reflux disease without esophagitis    Paranoia  (psychosis) (H)    Morbid obesity (H)    Schizoaffective disorder (H)    Umbilical hernia without obstruction and without gangrene    Fatty liver    Type 2 diabetes mellitus without complication, without long-term current use of insulin (H)    Elevated LFTs    Disorganized behavior    Infection due to 2019 novel coronavirus    Polypharmacy    Sedated due to multiple medications    Overdose, undetermined intent, initial encounter    Segmental and somatic dysfunction    SI (sacroiliac) joint dysfunction    mirena IUD 9/30/22    Neck pain    Hip pain, right    Chronic pain of both knees    Difficulty communicating    Difficulty using pragmatics in communication   Diabetes: T2DM, A1C of 5.1% on 6/28/2024    Progress on goals from last visit: Patient stated that she is very tired. Patient stated that she discharge Mom's Meals d/t needing to cleaning the foils on her fridge. Patient has been more consistent with three meals per day. Patient has been trying to add in a serving of fruits and/or vegetables daily. Patient has been biking or walking daily. Patient stated that she is hoping she will be able to have hernia surgery.  Goals:  Eat breakfast daily/3 meals per day  Continue to aim to have a fruit/veggie daily. Incorporate fresh, frozen, canned fruits and veggies with meals   Stay consistent with activity - walking     Dietary Recall:  Breakfast (8-10 AM): mini wheats  Lunch (12 PM): sandwiches (ham/turkey with cheese)  Dinner (5-7 PM): sandwiches (ham/turkey with cheese)  Typical snacks: none  Exercise:   Patient has been walking and biking - almost every day  Nutrition Diagnosis:    Obesity related to overeating and poor lifestyle habits as evidenced by patient's report of limited budget, inconsistent meals, large portions, frequent snacking of sweets, lack of activity and BMI 37.2      Intervention:  Food and/or nutrient delivery: stay consistent with three meals. Try adding in a fruit and/or vegetable each day.  Stay consistent with exercise routine.   Nutrition education: no resources requested or provided  Nutrition counseling: provided support for continued improvements    Monitoring/Evaluation:    Goals:  Eat breakfast daily/3 meals per day  Continue to aim to have a fruit/veggie daily. Incorporate fresh, frozen, canned fruits and veggies with meals   Stay consistent with activity - walking and biking      Patient to follow up in 2 month(s) with bariatrician and 4 month(s) with RD      Video-Visit Details    Type of service:  Video Visit    Video Start Time (time video started): 8:32 AM    Video End Time (time video stopped): 8:41 AM    Originating Location (pt. Location): Home      Distant Location (provider location):  Off-site    Mode of Communication:  Video Conference via [facility]    Physician has received verbal consent for a Video Visit from the patient? Yes      Karly Baca RD

## 2024-08-13 ENCOUNTER — OFFICE VISIT (OUTPATIENT)
Dept: SURGERY | Facility: CLINIC | Age: 39
End: 2024-08-13
Payer: COMMERCIAL

## 2024-08-13 ENCOUNTER — TELEPHONE (OUTPATIENT)
Dept: SURGERY | Facility: CLINIC | Age: 39
End: 2024-08-13

## 2024-08-13 VITALS
DIASTOLIC BLOOD PRESSURE: 79 MMHG | OXYGEN SATURATION: 94 % | WEIGHT: 223.4 LBS | HEIGHT: 66 IN | RESPIRATION RATE: 14 BRPM | SYSTOLIC BLOOD PRESSURE: 114 MMHG | HEART RATE: 101 BPM | TEMPERATURE: 97.9 F | BODY MASS INDEX: 35.9 KG/M2

## 2024-08-13 DIAGNOSIS — E66.01 MORBID OBESITY (H): Primary | ICD-10-CM

## 2024-08-13 DIAGNOSIS — K42.9 UMBILICAL HERNIA WITHOUT OBSTRUCTION AND WITHOUT GANGRENE: ICD-10-CM

## 2024-08-13 DIAGNOSIS — F25.0 SCHIZOAFFECTIVE DISORDER, BIPOLAR TYPE (H): Chronic | ICD-10-CM

## 2024-08-13 PROCEDURE — 99215 OFFICE O/P EST HI 40 MIN: CPT | Performed by: SURGERY

## 2024-08-13 ASSESSMENT — PAIN SCALES - GENERAL: PAINLEVEL: NO PAIN (0)

## 2024-08-13 NOTE — LETTER
8/13/2024      Maddy Ortiz  [address]  [place] [address]      Dear Colleague,    Thank you for referring your patient, Maddy Ortiz, to the [facility]. Please see a copy of my visit note below.      Assessment & Plan   Problem List Items Addressed This Visit          Digestive    Morbid obesity (H) - Primary       Behavioral    Schizoaffective disorder, bipolar type (H) (Chronic)       Other    Umbilical hernia without obstruction and without gangrene    Relevant Orders    CT Abdomen Pelvis w Contrast    Case Request: HERNIORRHAPHY, UMBILICAL, ROBOT-ASSISTED, LAPAROSCOPIC, USING DA MERCEDES XI (Completed)      37 yo F with umbilical hernia that is now more symptomatic    The patient was thoroughly counseled regarding Morbid obesity (H) [E66.01].     The patient was informed that the proposed procedure or medical intervention involves reduction and repair with or without mesh and does offer a very good likelihood of symptom relief. We also discussed the options of repairing the hernia laparoscopically, robotically, versus open. I recommend robotic repaired.     The patient was made aware of the risks of the procedure, including but not limited to:  nerve entrapment or injury, persistence of pain (10%), injury to the bowel/bladder, infertility, ischemic orchitis (rare), Injury to the vas deferens (rare), hematoma, mesh migration, mesh infection, cardiac or pulmonary complication and anesthesia related complications also that difficulties may be encountered during recovery to include: wound infection, recurrence (5-10%), seroma, hematoma and chronic pain.     In the course of the evaluation we did discuss other therapeutic options with the patient, including continued watchful waiting. The risks and benefits of these options were also discussed which include but are not limited to: incarceration and/or strangulation..     Also discussed were possible problems or difficulties  the patient may encounter if treatment was not pursued at this time.     The [facility]. Assistance during the procedure and during hospitalization may also be provided by other physicians, nurses and technicians.     We discussed risk factors of mesh infection -including uncontrolled diabetes, tobacco/nicotine use, immune suppressed medication usage, morbid obesity.  If the mesh becomes infected, it will need to come out surgically.  Then patient will need another hernia repair once here she recovers.    The patient will be provided additional education resources by the support staff. If there are ever any questions regarding their diagnosis or the procedure, the patient is encouraged to ask.     All of the patient s or their legal representative s questions have been answered to their satisfaction and they have indicated a clear understanding of this discussion.   Maddy expressed understanding of risks, benefits and alternatives and wished to proceed.     All findings, test results, and diagnosis were discussed with the patient. Maddy  participated in the decision making process and agreed with the plan of care. Questions were sought and answered.     Face to Face/patient Contact total time: 20 minutes  Pre Charting time: 10 minutes; Post charting time, communication and other activities: 10 minutes;   Total time:  40 minutes       FURTHER TESTING:       - CT - repeat CT scan as pt's hernia vs hernia content increased; this will help with surgical planning.     No follow-ups on file.      Terry Ramos is a 38 year old, presenting for the following health issues:  Follow Up    Been trying to lose weight  Not great at it  Been biking and eating less which minimal improvement  Hernia is much more sore now  Protrudes a lot more these days especially when she tries to bike   No signs of obstruction  Tolerating foods with no issues;  "normal BMs.    No changes to her medications or history.             Review of Systems  Constitutional, HEENT, cardiovascular, pulmonary, gi and gu systems are negative, except as otherwise noted.      Objective    /79 (BP Location: Right arm, Patient Position: Sitting, Cuff Size: Adult Large)   Pulse 101   Temp 97.9  F (36.6  C) (Tympanic)   Resp 14   Ht 1.664 m (5' 5.5\")   Wt 101.3 kg (223 lb 6.4 oz)   LMP  (LMP Unknown)   SpO2 94%   BMI 36.61 kg/m    Body mass index is 36.61 kg/m .  Physical Exam  Abdominal:                  Signed Electronically by: Shannon Mathias MD        Again, thank you for allowing me to participate in the care of your patient.        Sincerely,        Shannon Mathias MD  "

## 2024-08-13 NOTE — PROGRESS NOTES
Assessment & Plan   Problem List Items Addressed This Visit          Digestive    Morbid obesity (H) - Primary       Behavioral    Schizoaffective disorder, bipolar type (H) (Chronic)       Other    Umbilical hernia without obstruction and without gangrene    Relevant Orders    CT Abdomen Pelvis w Contrast    Case Request: HERNIORRHAPHY, UMBILICAL, ROBOT-ASSISTED, LAPAROSCOPIC, USING DA MERCEDES XI (Completed)      39 yo F with umbilical hernia that is now more symptomatic    The patient was thoroughly counseled regarding Morbid obesity (H) [E66.01].     The patient was informed that the proposed procedure or medical intervention involves reduction and repair with or without mesh and does offer a very good likelihood of symptom relief. We also discussed the options of repairing the hernia laparoscopically, robotically, versus open. I recommend robotic repaired.     The patient was made aware of the risks of the procedure, including but not limited to:  nerve entrapment or injury, persistence of pain (10%), injury to the bowel/bladder, infertility, ischemic orchitis (rare), Injury to the vas deferens (rare), hematoma, mesh migration, mesh infection, cardiac or pulmonary complication and anesthesia related complications also that difficulties may be encountered during recovery to include: wound infection, recurrence (5-10%), seroma, hematoma and chronic pain.     In the course of the evaluation we did discuss other therapeutic options with the patient, including continued watchful waiting. The risks and benefits of these options were also discussed which include but are not limited to: incarceration and/or strangulation..     Also discussed were possible problems or difficulties the patient may encounter if treatment was not pursued at this time.     The [facility]. Assistance during the procedure and during hospitalization may also be  provided by other physicians, nurses and technicians.     We discussed risk factors of mesh infection -including uncontrolled diabetes, tobacco/nicotine use, immune suppressed medication usage, morbid obesity.  If the mesh becomes infected, it will need to come out surgically.  Then patient will need another hernia repair once here she recovers.    The patient will be provided additional education resources by the support staff. If there are ever any questions regarding their diagnosis or the procedure, the patient is encouraged to ask.     All of the patient s or their legal representative s questions have been answered to their satisfaction and they have indicated a clear understanding of this discussion.   Maddy expressed understanding of risks, benefits and alternatives and wished to proceed.     All findings, test results, and diagnosis were discussed with the patient. Maddy  participated in the decision making process and agreed with the plan of care. Questions were sought and answered.     Face to Face/patient Contact total time: 20 minutes  Pre Charting time: 10 minutes; Post charting time, communication and other activities: 10 minutes;   Total time:  40 minutes       FURTHER TESTING:       - CT - repeat CT scan as pt's hernia vs hernia content increased; this will help with surgical planning.     No follow-ups on file.      Subjective   Rachel is a 38 year old, presenting for the following health issues:  Follow Up    Been trying to lose weight  Not great at it  Been biking and eating less which minimal improvement  Hernia is much more sore now  Protrudes a lot more these days especially when she tries to bike   No signs of obstruction  Tolerating foods with no issues; normal BMs.    No changes to her medications or history.             Review of Systems  Constitutional, HEENT, cardiovascular, pulmonary, gi and gu systems are negative, except as otherwise noted.      Objective    /79 (BP Location:  "Right arm, Patient Position: Sitting, Cuff Size: Adult Large)   Pulse 101   Temp 97.9  F (36.6  C) (Tympanic)   Resp 14   Ht 1.664 m (5' 5.5\")   Wt 101.3 kg (223 lb 6.4 oz)   LMP  (LMP Unknown)   SpO2 94%   BMI 36.61 kg/m    Body mass index is 36.61 kg/m .  Physical Exam  Abdominal:                  Signed Electronically by: Shannon Mathias MD    "

## 2024-08-13 NOTE — NURSING NOTE
"Chief Complaint   Patient presents with    Follow Up       Vitals:    08/13/24 1116   BP: 114/79   BP Location: Right arm   Patient Position: Sitting   Cuff Size: Adult Large   Pulse: 101   Resp: 14   Temp: 97.9  F (36.6  C)   TempSrc: Tympanic   SpO2: 94%   Weight: 101.3 kg (223 lb 6.4 oz)   Height: 1.664 m (5' 5.5\")     Wt Readings from Last 1 Encounters:   08/13/24 101.3 kg (223 lb 6.4 oz)       Erna MACIAS CMA.................8/13/2024    "

## 2024-08-14 NOTE — TELEPHONE ENCOUNTER
Type of surgery: HERNIORRHAPHY, UMBILICAL, ROBOT-ASSISTED, LAPAROSCOPIC, USING DA MERCEDES XI   Location of surgery: [place] [facility]  Date and time of surgery: 9/26  Surgeon: Mey  Pre-Op Appt Date: 8/28  Post-Op Appt Date: 10/8   Packet sent out: Yes  Pre-cert/Authorization completed:  Not Applicable  Date: na

## 2024-08-28 ENCOUNTER — OFFICE VISIT (OUTPATIENT)
Dept: FAMILY MEDICINE | Facility: CLINIC | Age: 39
End: 2024-08-28
Payer: COMMERCIAL

## 2024-08-28 VITALS
BODY MASS INDEX: 36.92 KG/M2 | RESPIRATION RATE: 20 BRPM | OXYGEN SATURATION: 98 % | HEART RATE: 93 BPM | SYSTOLIC BLOOD PRESSURE: 110 MMHG | HEIGHT: 66 IN | TEMPERATURE: 97.6 F | DIASTOLIC BLOOD PRESSURE: 72 MMHG | WEIGHT: 229.7 LBS

## 2024-08-28 DIAGNOSIS — Z01.818 PRE-OP EVALUATION: Primary | ICD-10-CM

## 2024-08-28 DIAGNOSIS — K42.9 UMBILICAL HERNIA WITHOUT OBSTRUCTION AND WITHOUT GANGRENE: ICD-10-CM

## 2024-08-28 DIAGNOSIS — E11.9 TYPE 2 DIABETES MELLITUS WITHOUT COMPLICATION, WITHOUT LONG-TERM CURRENT USE OF INSULIN (H): ICD-10-CM

## 2024-08-28 LAB
ANION GAP SERPL CALCULATED.3IONS-SCNC: 10 MMOL/L (ref 7–15)
BUN SERPL-MCNC: 14 MG/DL (ref 6–20)
CALCIUM SERPL-MCNC: 9.6 MG/DL (ref 8.8–10.4)
CHLORIDE SERPL-SCNC: 108 MMOL/L (ref 98–107)
CREAT SERPL-MCNC: 0.82 MG/DL (ref 0.51–0.95)
EGFRCR SERPLBLD CKD-EPI 2021: >90 ML/MIN/1.73M2
ERYTHROCYTE [DISTWIDTH] IN BLOOD BY AUTOMATED COUNT: 11.6 % (ref 10–15)
GLUCOSE SERPL-MCNC: 127 MG/DL (ref 70–99)
HCO3 SERPL-SCNC: 23 MMOL/L (ref 22–29)
HCT VFR BLD AUTO: 43.9 % (ref 35–47)
HGB BLD-MCNC: 14 G/DL (ref 11.7–15.7)
MCH RBC QN AUTO: 28.3 PG (ref 26.5–33)
MCHC RBC AUTO-ENTMCNC: 31.9 G/DL (ref 31.5–36.5)
MCV RBC AUTO: 89 FL (ref 78–100)
PLATELET # BLD AUTO: 173 10E3/UL (ref 150–450)
POTASSIUM SERPL-SCNC: 4.1 MMOL/L (ref 3.4–5.3)
RBC # BLD AUTO: 4.94 10E6/UL (ref 3.8–5.2)
SODIUM SERPL-SCNC: 141 MMOL/L (ref 135–145)
WBC # BLD AUTO: 9.4 10E3/UL (ref 4–11)

## 2024-08-28 PROCEDURE — 99214 OFFICE O/P EST MOD 30 MIN: CPT | Performed by: NURSE PRACTITIONER

## 2024-08-28 PROCEDURE — 36415 COLL VENOUS BLD VENIPUNCTURE: CPT | Performed by: NURSE PRACTITIONER

## 2024-08-28 PROCEDURE — 85027 COMPLETE CBC AUTOMATED: CPT | Performed by: NURSE PRACTITIONER

## 2024-08-28 PROCEDURE — 80048 BASIC METABOLIC PNL TOTAL CA: CPT | Performed by: NURSE PRACTITIONER

## 2024-08-28 ASSESSMENT — PAIN SCALES - GENERAL: PAINLEVEL: NO PAIN (0)

## 2024-08-28 NOTE — PROGRESS NOTES
Preoperative Evaluation  [facility]  [address] [address]  Phone: [phone]  Primary Provider: PHILL Luo CNP  Pre-op Performing Provider: PHILL Luo CNP  Aug 28, 2024             8/28/2024   Surgical Information   What procedure is being done? umbilical hernia mesh-HERNIORRHAPHY, UMBILICAL, ROBOT-ASSISTED, LAPAROSCOPIC, USING DA MERCEDES XI    Facility or Hospital where procedure/surgery will be performed: [facility]   Who is doing the procedure / surgery? Dr Mathias   Date of surgery / procedure: Sept 26   Time of surgery / procedure: Will find out week of   Where do you plan to recover after surgery? at home with family        Fax number for surgical facility: Note does not need to be faxed, will be available electronically in Epic.    Assessment & Plan     The proposed surgical procedure is considered INTERMEDIATE risk.    Pre-op evaluation  - CBC with platelets; Future  - Basic metabolic panel  (Ca, Cl, CO2, Creat, Gluc, K, Na, BUN); Future    Umbilical hernia without obstruction and without gangrene  - CBC with platelets; Future  - Basic metabolic panel  (Ca, Cl, CO2, Creat, Gluc, K, Na, BUN); Future    Type 2 diabetes mellitus without complication, without long-term current use of insulin (H)  - CBC with platelets; Future  - Basic metabolic panel  (Ca, Cl, CO2, Creat, Gluc, K, Na, BUN); Future        Risks and Recommendations  The patient has the following additional risks and recommendations for perioperative complications:   - No identified additional risk factors other than previously addressed    Antiplatelet or Anticoagulation Medication Instructions   - Patient is on no antiplatelet or anticoagulation medications.    Additional Medication Instructions  Take all scheduled medications on the day of surgery EXCEPT for modifications listed below:   - GLP-1 Injectable (exenitide, liraglutide, semaglutide, dulaglutide, etc.): DO  NOT TAKE 7 days before surgery    - lithium: Check lithium level. Continue without modification.    - SSRIs, SNRIs, TCAs, Antipsychotics: Continue without modification.    - naltrexone: DO NOT TAKE 4 days prior to weight loss surgery.    Recommendation  Approval given to proceed with proposed procedure, without further diagnostic evaluation.              Terry Ramos is a 38 year old, presenting for the following:  Pre-Op Exam        Via the Health Maintenance questionnaire, the patient has reported the following services have been completed -Eye Exam: [facility] [place] 2024-08-07, this information has been sent to the abstraction team.    HPI related to upcoming procedure:   Large umbilical hernia - more symptomatic      Wt Readings from Last 4 Encounters:   08/28/24 104.2 kg (229 lb 11.2 oz)   08/13/24 101.3 kg (223 lb 6.4 oz)   08/02/24 98.4 kg (217 lb)   07/18/24 97.5 kg (215 lb)           8/28/2024   Pre-Op Questionnaire   Have you ever had a heart attack or stroke? No   Have you ever had surgery on your heart or blood vessels, such as a stent placement, a coronary artery bypass, or surgery on an artery in your head, neck, heart, or legs? No   Do you have chest pain with activity? No   Do you have a history of heart failure? No   Do you currently have a cold, bronchitis or symptoms of other infection? No   Do you have a cough, shortness of breath, or wheezing? No   Do you or anyone in your family have previous history of blood clots? No   Do you or does anyone in your family have a serious bleeding problem such as prolonged bleeding following surgeries or cuts? No   Have you ever had problems with anemia or been told to take iron pills? No   Have you had any abnormal blood loss such as black, tarry or bloody stools, or abnormal vaginal bleeding? No   Have you ever had a blood transfusion? No   Are you willing to have a blood transfusion if it is medically needed before, during, or after your  surgery? Yes   Have you or any of your relatives ever had problems with anesthesia? No   Do you have sleep apnea, excessive snoring or daytime drowsiness? No   Do you have any artifical heart valves or other implanted medical devices like a pacemaker, defibrillator, or continuous glucose monitor? No   Do you have artificial joints? No   Are you allergic to latex? No        Health Care Directive  Patient does not have a Health Care Directive or Living Will: Discussed advance care planning with patient; however, patient declined at this time.    Preoperative Review of    reviewed - no record of controlled substances prescribed.          Patient Active Problem List    Diagnosis Date Noted    Difficulty using pragmatics in communication 04/12/2024     Priority: Medium    Difficulty communicating 11/20/2023     Priority: Medium    Neck pain 10/06/2023     Priority: Medium    Hip pain, right 10/06/2023     Priority: Medium    Chronic pain of both knees 10/06/2023     Priority: Medium    mirena IUD 9/30/22 09/30/2022     Priority: Medium     Mirena IUD placed 9/30/2022  LOT# [id number]  Exp: 10/2024  NDC# 07595-476-93    Lexie Cartagena on 9/30/2022 at 1:35 PM          Overdose, undetermined intent, initial encounter 06/18/2022     Priority: Medium    Segmental and somatic dysfunction 05/10/2022     Priority: Medium    SI (sacroiliac) joint dysfunction 05/10/2022     Priority: Medium    Polypharmacy 04/25/2022     Priority: Medium    Sedated due to multiple medications 04/25/2022     Priority: Medium    Elevated LFTs 01/08/2022     Priority: Medium    Disorganized behavior 01/08/2022     Priority: Medium    Infection due to 2019 novel coronavirus 01/08/2022     Priority: Medium    Type 2 diabetes mellitus without complication, without long-term current use of insulin (H) 12/13/2021     Priority: Medium    Fatty liver 11/05/2021     Priority: Medium    Umbilical hernia without obstruction and without gangrene 10/26/2021      Priority: Medium    Schizoaffective disorder (H) 07/13/2021     Priority: Medium    Morbid obesity (H) 01/02/2019     Priority: Medium    Paranoia (psychosis) (H) 08/24/2018     Priority: Medium    Gastroesophageal reflux disease without esophagitis 06/08/2018     Priority: Medium    Schizoaffective disorder, bipolar type (H) 05/07/2018     Priority: Medium    Encounter for IUD insertion 09/15/2017     Priority: Medium     4/24/22 bryanna insertion lot# [id number] exp-4/2023      Seasonal allergic rhinitis due to pollen 11/04/2016     Priority: Medium    Allergic rhinitis due to mold 11/04/2016     Priority: Medium    Allergic rhinitis due to animal dander 11/04/2016     Priority: Medium    Allergic rhinitis due to dust mite 11/04/2016     Priority: Medium    Depression with anxiety 01/26/2015     Priority: Medium    Insomnia 07/18/2013     Priority: Medium    Bipolar 1 disorder (H) 12/06/2012     Priority: Medium     Planning on seeing Purvi (psychiatric nurse)      Paranoid type delusional disorder (H) 11/14/2012     Priority: Medium    Psychosis (H) 10/16/2012     Priority: Medium    Animal dander allergy 05/09/2012     Priority: Medium     Dog and Cat      CARDIOVASCULAR SCREENING; LDL GOAL LESS THAN 160 05/09/2012     Priority: Medium      Past Medical History:   Diagnosis Date    Bipolar 1 disorder (H) 12/6/2012    Depressive disorder     Diabetes mellitus, type 2 (H) 12/13/2021    Paranoid type delusional disorder (H) 11/14/2012     Past Surgical History:   Procedure Laterality Date    TONSILLECTOMY & ADENOIDECTOMY      as a child    TONSILLECTOMY & ADENOIDECTOMY       Current Outpatient Medications   Medication Sig Dispense Refill    ACCU-CHEK GUIDE test strip USE TO TEST BLOOD SUGAR 6 TIMES DAILY OR AS DIRECTED 600 strip 3    acetaminophen (TYLENOL) 325 MG tablet Take 2 tablets (650 mg) by mouth every 4 hours as needed for mild pain (to moderate pain)      albuterol (PROAIR HFA/PROVENTIL HFA/VENTOLIN HFA)  108 (90 Base) MCG/ACT inhaler Inhale 2 puffs into the lungs every 4 hours as needed for wheezing or shortness of breath 18 g 1    ammonium lactate (LAC-HYDRIN) 12 % external cream Apply topically 2 times daily as needed for dry skin 385 g 3    azelastine (ASTELIN) 0.1 % nasal spray Spray 2 sprays into both nostrils 2 times daily as needed for rhinitis 90 mL 1    azelastine (OPTIVAR) 0.05 % ophthalmic solution Apply 1 drop to eye 2 times daily as needed (itchy/watery/red eyes) 6 mL 11    blood glucose (ONETOUCH VERIO IQ) test strip Use to test blood sugar 2 times daily or as directed. 100 strip 11    Blood Glucose Monitoring Suppl (ONETOUCH VERIO FLEX SYSTEM) w/Device KIT 1 each as needed 1 kit 0    fluticasone (FLONASE) 50 MCG/ACT nasal spray Spray 2 sprays into both nostrils daily 15.8 mL 11    ibuprofen (ADVIL/MOTRIN) 200 MG tablet Take 200 mg by mouth every 4 hours as needed for pain      insulin pen needle (BD PEN NEEDLE ROZ 2ND GEN) 32G X 4 MM miscellaneous USE 1 PEN NEEDLES DAILY WITH VICTOZA 100 each 3    lamoTRIgine (LAMICTAL) 200 MG tablet Take 200 mg by mouth at bedtime. Also 25mg in the AM and 25mg at llunch time.      levocetirizine (XYZAL) 5 MG tablet Take 1 tablet (5 mg) by mouth every evening 90 tablet 1    levonorgestrel (MIRENA) 20 MCG/DAY IUD 1 each (20 mcg) by Intrauterine route once      levothyroxine (SYNTHROID/LEVOTHROID) 50 MCG tablet TAKE 1 TABLET (50 MCG) BY MOUTH DAILY BEFORE BREAKFAST 90 tablet 3    liraglutide (VICTOZA PEN) 18 MG/3ML solution INJECT 1.8 MG UNDER THE SKIN ONCE DAILY 27 mL 1    lithium ER (LITHOBID) 300 MG CR tablet Take 300 mg by mouth daily 900 mg daily      Melatonin 10 MG TABS tablet Take 10 mg by mouth at bedtime      naltrexone (DEPADE/REVIA) 50 MG tablet Take 0.5 tablets by mouth daily at 2 pm      OneTouch Delica Lancets 33G MISC 1 each 2 times daily 100 each 11    risperiDONE (RISPERDAL) 1 MG tablet Take 1 mg by mouth at bedtime      risperiDONE microspheres ER  "(RISPERDAL CONSTA) 50 MG injection Next due 6/30 (Patient taking differently: 50 mg every 14 days. Next due 6/30;)      spacer (OPTICHAMBER APOLINAR) holding chamber Use with Symbicort and albuterol inhalers as prescribed 1 each 0    venlafaxine (EFFEXOR XR) 75 MG 24 hr capsule daily. Taking 225 mg daily      vitamin D3 (CHOLECALCIFEROL) 50 mcg (2000 units) tablet Take 1 tablet (50 mcg) by mouth daily (Patient not taking: Reported on 8/28/2024) 90 tablet 3       Allergies   Allergen Reactions    Dust Mites Shortness Of Breath    Animal Dander      Other reaction(s): *Unknown    Mold      Other reaction(s): Runny Nose    Trees         Social History     Tobacco Use    Smoking status: Former     Current packs/day: 0.50     Types: Cigarettes    Smokeless tobacco: Former    Tobacco comments:     around 2nd hand smoke   Substance Use Topics    Alcohol use: Not Currently       History   Drug Use Unknown             Review of Systems  Constitutional, HEENT, cardiovascular, pulmonary, GI, , musculoskeletal, neuro, skin, endocrine and psych systems are negative, except as otherwise noted.    Objective    /72 (BP Location: Right arm, Patient Position: Sitting, Cuff Size: Adult Regular)   Pulse 93   Temp 97.6  F (36.4  C) (Tympanic)   Resp 20   Ht 1.664 m (5' 5.5\")   Wt 104.2 kg (229 lb 11.2 oz)   LMP  (LMP Unknown)   SpO2 98%   BMI 37.64 kg/m     Estimated body mass index is 37.64 kg/m  as calculated from the following:    Height as of this encounter: 1.664 m (5' 5.5\").    Weight as of this encounter: 104.2 kg (229 lb 11.2 oz).  Physical Exam  GENERAL: alert and no distress  EYES: Eyes grossly normal to inspection, PERRL and conjunctivae and sclerae normal  HENT: ear canals and TM's normal, nose and mouth without ulcers or lesions  NECK: no adenopathy, no asymmetry, masses, or scars  RESP: lungs clear to auscultation - no rales, rhonchi or wheezes  CV: regular rate and rhythm, normal S1 S2, no S3 or S4, no " murmur, click or rub, no peripheral edema  ABDOMEN: soft, nontender and bowel sounds normal  MS: no gross musculoskeletal defects noted, no edema  SKIN: no suspicious lesions or rashes  NEURO: Normal strength and tone, mentation intact and speech normal  PSYCH: mentation appears normal, affect normal/bright    Recent Labs   Lab Test 06/28/24  0937 06/08/24  1704 12/06/23  1001 10/18/23  0929   HGB  --  14.7 14.0 14.1   PLT  --  183 164 162   NA  --  139  --  137   POTASSIUM  --  3.8  --  3.9   CR  --  0.91  --  0.80   A1C 5.1  --  4.9 5.3        Diagnostics  Recent Results (from the past 24 hour(s))   CBC with platelets    Collection Time: 08/28/24 11:09 AM   Result Value Ref Range    WBC Count 9.4 4.0 - 11.0 10e3/uL    RBC Count 4.94 3.80 - 5.20 10e6/uL    Hemoglobin 14.0 11.7 - 15.7 g/dL    Hematocrit 43.9 35.0 - 47.0 %    MCV 89 78 - 100 fL    MCH 28.3 26.5 - 33.0 pg    MCHC 31.9 31.5 - 36.5 g/dL    RDW 11.6 10.0 - 15.0 %    Platelet Count 173 150 - 450 10e3/uL   Basic metabolic panel  (Ca, Cl, CO2, Creat, Gluc, K, Na, BUN)    Collection Time: 08/28/24 11:09 AM   Result Value Ref Range    Sodium 141 135 - 145 mmol/L    Potassium 4.1 3.4 - 5.3 mmol/L    Chloride 108 (H) 98 - 107 mmol/L    Carbon Dioxide (CO2) 23 22 - 29 mmol/L    Anion Gap 10 7 - 15 mmol/L    Urea Nitrogen 14.0 6.0 - 20.0 mg/dL    Creatinine 0.82 0.51 - 0.95 mg/dL    GFR Estimate >90 >60 mL/min/1.73m2    Calcium 9.6 8.8 - 10.4 mg/dL    Glucose 127 (H) 70 - 99 mg/dL        No EKG required, no history of coronary heart disease, significant arrhythmia, peripheral arterial disease or other structural heart disease.    Revised Cardiac Risk Index (RCRI)  The patient has the following serious cardiovascular risks for perioperative complications:   - No serious cardiac risks = 0 points     RCRI Interpretation: 0 points: Class I (very low risk - 0.4% complication rate)         Signed Electronically by: PHILL Luo CNP  A copy of this evaluation  report is provided to the requesting physician.          negative...

## 2024-08-28 NOTE — PATIENT INSTRUCTIONS
Stop the Victoza 7 days prior to surgery  Stop the naltrexone 4 days prior to surgery.    Continue all other medications.

## 2024-08-28 NOTE — LETTER
September 3, 2024      Rachel Ortiz  [address]  [facility] [address]        Dear ,    We are writing to inform you of your test results.    Normal pre-op labs     Resulted Orders   CBC with platelets   Result Value Ref Range    WBC Count 9.4 4.0 - 11.0 10e3/uL    RBC Count 4.94 3.80 - 5.20 10e6/uL    Hemoglobin 14.0 11.7 - 15.7 g/dL    Hematocrit 43.9 35.0 - 47.0 %    MCV 89 78 - 100 fL    MCH 28.3 26.5 - 33.0 pg    MCHC 31.9 31.5 - 36.5 g/dL    RDW 11.6 10.0 - 15.0 %    Platelet Count 173 150 - 450 10e3/uL   Basic metabolic panel  (Ca, Cl, CO2, Creat, Gluc, K, Na, BUN)   Result Value Ref Range    Sodium 141 135 - 145 mmol/L    Potassium 4.1 3.4 - 5.3 mmol/L    Chloride 108 (H) 98 - 107 mmol/L    Carbon Dioxide (CO2) 23 22 - 29 mmol/L    Anion Gap 10 7 - 15 mmol/L    Urea Nitrogen 14.0 6.0 - 20.0 mg/dL    Creatinine 0.82 0.51 - 0.95 mg/dL    GFR Estimate >90 >60 mL/min/1.73m2      Comment:      eGFR calculated using 2021 CKD-EPI equation.    Calcium 9.6 8.8 - 10.4 mg/dL      Comment:      Reference intervals for this test were updated on 7/16/2024 to reflect our healthy population more accurately. There may be differences in the flagging of prior results with similar values performed with this method. Those prior results can be interpreted in the context of the updated reference intervals.    Glucose 127 (H) 70 - 99 mg/dL       If you have any questions or concerns, please call the clinic at the number listed above.       Sincerely,      PHILL Luo CNP

## 2024-08-30 DIAGNOSIS — E11.9 TYPE 2 DIABETES MELLITUS WITHOUT COMPLICATION, WITHOUT LONG-TERM CURRENT USE OF INSULIN (H): ICD-10-CM

## 2024-08-30 RX ORDER — LIRAGLUTIDE 6 MG/ML
INJECTION SUBCUTANEOUS
Qty: 27 ML | Refills: 1 | Status: SHIPPED | OUTPATIENT
Start: 2024-08-30 | End: 2024-09-10 | Stop reason: DRUGHIGH

## 2024-08-30 RX ORDER — LIRAGLUTIDE 6 MG/ML
INJECTION SUBCUTANEOUS
Qty: 27 ML | Refills: 3 | Status: SHIPPED | OUTPATIENT
Start: 2024-08-30 | End: 2024-09-10 | Stop reason: DRUGHIGH

## 2024-08-30 NOTE — TELEPHONE ENCOUNTER
Patient called back. [facility] does not have medication in stock. She would like it sent to [facility] in [place].   Phone number: [phone]  Fax number: [phone]    [address] [place] [address]

## 2024-08-30 NOTE — TELEPHONE ENCOUNTER
Medication Question or Refill        What medication are you calling about (include dose and sig)?: Pending Prescriptions:                       Disp   Refills    liraglutide (VICTOZA PEN) 18 MG/3ML solut*27 mL  1            Sig: INJECT 1.8 MG UNDER THE SKIN ONCE DAILY      Preferred Pharmacy:   [facility] [address] [place] [address] [place] [address] [facility] [address]  Phone: [phone] Fax: [phone]      Controlled Substance Agreement on file:   CSA -- Patient Level:    CSA: None found at the patient level.       Who prescribed the medication?: Shirley Freeman    Do you need a refill? Yes      Do you have any questions or concerns?  No      Could we send this information to you [facility] or would you prefer to receive a phone call?:   Patient would prefer a phone call   Okay to leave a detailed message?: Yes at Cell number on file:    Telephone Information:   Mobile [phone]

## 2024-08-30 NOTE — TELEPHONE ENCOUNTER
[facility] did not get the refill authorization for this med but they do not have med in stock.  Pt was made aware of this and will call around to find out what pharmacy's do have it and will then let us know.    Stephanie Galeana on 8/30/2024 at 4:03 PM

## 2024-08-30 NOTE — TELEPHONE ENCOUNTER
Patient called the clinic asking for it to be sent elsewhere. RN sent medication to [facility] as requested.     GFR Estimate   Date Value Ref Range Status   08/28/2024 >90 >60 mL/min/1.73m2 Final     Comment:     eGFR calculated using 2021 CKD-EPI equation.   08/17/2020 >90 >60 mL/min/[1.73_m2] Final     Comment:     Non  GFR Calc  Starting 12/18/2018, serum creatinine based estimated GFR (eGFR) will be   calculated using the Chronic Kidney Disease Epidemiology Collaboration   (CKD-EPI) equation.       Lexie Pabon RN on 8/30/2024 at 4:44 PM

## 2024-09-09 ENCOUNTER — TRANSFERRED RECORDS (OUTPATIENT)
Dept: HEALTH INFORMATION MANAGEMENT | Facility: CLINIC | Age: 39
End: 2024-09-09
Payer: COMMERCIAL

## 2024-09-09 LAB — PHQ9 SCORE: 1

## 2024-09-10 ENCOUNTER — VIRTUAL VISIT (OUTPATIENT)
Dept: FAMILY MEDICINE | Facility: CLINIC | Age: 39
End: 2024-09-10
Payer: COMMERCIAL

## 2024-09-10 DIAGNOSIS — E11.9 TYPE 2 DIABETES MELLITUS WITHOUT COMPLICATION, WITHOUT LONG-TERM CURRENT USE OF INSULIN (H): Primary | ICD-10-CM

## 2024-09-10 PROCEDURE — 99214 OFFICE O/P EST MOD 30 MIN: CPT | Mod: 95 | Performed by: NURSE PRACTITIONER

## 2024-09-10 RX ORDER — LIRAGLUTIDE 6 MG/ML
1.2 INJECTION SUBCUTANEOUS DAILY
Qty: 18 ML | Refills: 3 | Status: SHIPPED | OUTPATIENT
Start: 2024-09-10

## 2024-09-10 NOTE — PROGRESS NOTES
Rachel is a 38 year old who is being evaluated via a billable video visit.              Assessment & Plan     Type 2 diabetes mellitus without complication, without long-term current use of insulin (H)  Lower blood sugars recently - she feels this is due to oral risperidone, which would be unexpected.  However, she is symptomatic with these lows.  A1c has been low for >1 year  Therefore will decrease Victoza to 1.2 mg daily.  Follow up if no improvement in blood sugars in one week.  - liraglutide (VICTOZA) 18 MG/3ML solution; Inject 1.2 mg subcutaneously daily.    The risks, benefits and treatment options of prescribed medications or other treatments have been discussed with the patient. The patient verbalized their understanding and should call or follow up if no improvement or if they develop further problems.  Shirley Freeman, OLGA                      Subjective   Rachel is a 38 year old, presenting for the following health issues:  No chief complaint on file.      Video Start Time: 11:40 AM      HPI     Lower blood sugars since switching to oral risperidone    Morning blood sugars are lower than normal - lowest 70  This morning was at 85 - felt nauseated  Yesterday evening was 95, the night before was 120      Lab Results   Component Value Date    A1C 5.1 06/28/2024    A1C 4.9 12/06/2023    A1C 5.3 10/18/2023    A1C 5.1 05/08/2023    A1C 5.4 10/14/2022    A1C 4.7 06/09/2013       Wt Readings from Last 4 Encounters:   08/28/24 104.2 kg (229 lb 11.2 oz)   08/13/24 101.3 kg (223 lb 6.4 oz)   08/02/24 98.4 kg (217 lb)   07/18/24 97.5 kg (215 lb)               Review of Systems  Constitutional, HEENT, cardiovascular, pulmonary, gi and gu systems are negative, except as otherwise noted.      Objective           Vitals:  No vitals were obtained today due to virtual visit.    Physical Exam   GENERAL: alert and no distress  EYES: Eyes grossly normal to inspection.  No discharge or erythema, or obvious scleral/conjunctival  abnormalities.  RESP: No audible wheeze, cough, or visible cyanosis.    SKIN: Visible skin clear. No significant rash, abnormal pigmentation or lesions.  NEURO: Cranial nerves grossly intact.  Mentation and speech appropriate for age.  PSYCH: Appropriate affect, tone, and pace of words          Video-Visit Details    Type of service:  Video Visit   Video End Time:11:50 AM  Originating Location (pt. Location): Home    Distant Location (provider location):  On-site  Platform used for Video Visit: Perri  Signed Electronically by: PHILL Luo CNP

## 2024-09-13 ENCOUNTER — HOSPITAL ENCOUNTER (OUTPATIENT)
Dept: CT IMAGING | Facility: CLINIC | Age: 39
Discharge: HOME | End: 2024-09-13
Attending: SURGERY | Admitting: SURGERY
Payer: COMMERCIAL

## 2024-09-13 DIAGNOSIS — K42.9 UMBILICAL HERNIA WITHOUT OBSTRUCTION AND WITHOUT GANGRENE: ICD-10-CM

## 2024-09-13 PROCEDURE — 250N000009 HC RX 250: Performed by: RADIOLOGY

## 2024-09-13 PROCEDURE — 74177 CT ABD & PELVIS W/CONTRAST: CPT

## 2024-09-13 PROCEDURE — 250N000011 HC RX IP 250 OP 636: Performed by: RADIOLOGY

## 2024-09-13 RX ORDER — IOPAMIDOL 755 MG/ML
112 INJECTION, SOLUTION INTRAVASCULAR ONCE
Status: COMPLETED | OUTPATIENT
Start: 2024-09-13 | End: 2024-09-13

## 2024-09-13 RX ADMIN — IOPAMIDOL 112 ML: 755 INJECTION, SOLUTION INTRAVENOUS at 13:50

## 2024-09-13 RX ADMIN — SODIUM CHLORIDE 68 ML: 9 INJECTION, SOLUTION INTRAVENOUS at 13:51

## 2024-09-17 ENCOUNTER — MYC MEDICAL ADVICE (OUTPATIENT)
Dept: FAMILY MEDICINE | Facility: CLINIC | Age: 39
End: 2024-09-17
Payer: COMMERCIAL

## 2024-09-23 ENCOUNTER — TELEPHONE (OUTPATIENT)
Dept: FAMILY MEDICINE | Facility: CLINIC | Age: 39
End: 2024-09-23
Payer: COMMERCIAL

## 2024-09-23 NOTE — TELEPHONE ENCOUNTER
RN reached out to patient via phone to follow-up to her Saloni inquiry regarding medication and blood sugars, as it still isn't clear what levels are, etc.   Patient was instructed to return call to [facility] main line at [phone] to speak with an RN.    Saloni sent.    Chiquis Roe RN  [facility]

## 2024-09-23 NOTE — TELEPHONE ENCOUNTER
Pt called reporting that she has a lesion on her right hip that she believes occurred due to clothing that was rubbing in the location.    Started 4-5 days ago.    Pt describes it is red and tender to palpation.  It seems wet at times and dry/peeling too.  Nickel sized.    Denies streaks.  Seems a little worse, a little larger.    Pt is concerned because she is supposed to have surgery, hernia repair, with Dr Mathias on Thursday, 9/26.    Routed to provider.  Ok to be seen Tuesday, use a same day?    Elvira Quinn RN

## 2024-09-24 ENCOUNTER — OFFICE VISIT (OUTPATIENT)
Dept: FAMILY MEDICINE | Facility: CLINIC | Age: 39
End: 2024-09-24
Payer: COMMERCIAL

## 2024-09-24 ENCOUNTER — TRANSFERRED RECORDS (OUTPATIENT)
Dept: HEALTH INFORMATION MANAGEMENT | Facility: CLINIC | Age: 39
End: 2024-09-24

## 2024-09-24 ENCOUNTER — TELEPHONE (OUTPATIENT)
Dept: FAMILY MEDICINE | Facility: CLINIC | Age: 39
End: 2024-09-24

## 2024-09-24 ENCOUNTER — ANESTHESIA EVENT (OUTPATIENT)
Dept: SURGERY | Facility: CLINIC | Age: 39
End: 2024-09-24
Payer: COMMERCIAL

## 2024-09-24 VITALS
HEIGHT: 65 IN | RESPIRATION RATE: 16 BRPM | DIASTOLIC BLOOD PRESSURE: 78 MMHG | WEIGHT: 230.9 LBS | SYSTOLIC BLOOD PRESSURE: 120 MMHG | BODY MASS INDEX: 38.47 KG/M2 | HEART RATE: 74 BPM | OXYGEN SATURATION: 97 % | TEMPERATURE: 97.5 F

## 2024-09-24 DIAGNOSIS — G89.29 CHRONIC PAIN OF BOTH KNEES: ICD-10-CM

## 2024-09-24 DIAGNOSIS — E03.9 ACQUIRED HYPOTHYROIDISM: ICD-10-CM

## 2024-09-24 DIAGNOSIS — M25.562 CHRONIC PAIN OF BOTH KNEES: ICD-10-CM

## 2024-09-24 DIAGNOSIS — M25.561 CHRONIC PAIN OF BOTH KNEES: ICD-10-CM

## 2024-09-24 DIAGNOSIS — E11.9 TYPE 2 DIABETES MELLITUS WITHOUT COMPLICATION, WITHOUT LONG-TERM CURRENT USE OF INSULIN (H): Primary | ICD-10-CM

## 2024-09-24 LAB
EST. AVERAGE GLUCOSE BLD GHB EST-MCNC: 117 MG/DL
HBA1C MFR BLD: 5.7 % (ref 0–5.6)
PHQ9 SCORE: 2
TSH SERPL DL<=0.005 MIU/L-ACNC: 2.16 UIU/ML (ref 0.3–4.2)

## 2024-09-24 PROCEDURE — 84443 ASSAY THYROID STIM HORMONE: CPT | Performed by: NURSE PRACTITIONER

## 2024-09-24 PROCEDURE — 91320 SARSCV2 VAC 30MCG TRS-SUC IM: CPT | Performed by: NURSE PRACTITIONER

## 2024-09-24 PROCEDURE — 83036 HEMOGLOBIN GLYCOSYLATED A1C: CPT | Performed by: NURSE PRACTITIONER

## 2024-09-24 PROCEDURE — 36415 COLL VENOUS BLD VENIPUNCTURE: CPT | Performed by: NURSE PRACTITIONER

## 2024-09-24 PROCEDURE — G0008 ADMIN INFLUENZA VIRUS VAC: HCPCS | Performed by: NURSE PRACTITIONER

## 2024-09-24 PROCEDURE — 90656 IIV3 VACC NO PRSV 0.5 ML IM: CPT | Performed by: NURSE PRACTITIONER

## 2024-09-24 PROCEDURE — 90480 ADMN SARSCOV2 VAC 1/ONLY CMP: CPT | Performed by: NURSE PRACTITIONER

## 2024-09-24 PROCEDURE — 99214 OFFICE O/P EST MOD 30 MIN: CPT | Mod: 25 | Performed by: NURSE PRACTITIONER

## 2024-09-24 RX ORDER — LEVOTHYROXINE SODIUM 50 UG/1
50 TABLET ORAL DAILY
Qty: 90 TABLET | Refills: 3 | Status: SHIPPED | OUTPATIENT
Start: 2024-09-24

## 2024-09-24 ASSESSMENT — LIFESTYLE VARIABLES: TOBACCO_USE: 1

## 2024-09-24 ASSESSMENT — PATIENT HEALTH QUESTIONNAIRE - PHQ9
10. IF YOU CHECKED OFF ANY PROBLEMS, HOW DIFFICULT HAVE THESE PROBLEMS MADE IT FOR YOU TO DO YOUR WORK, TAKE CARE OF THINGS AT HOME, OR GET ALONG WITH OTHER PEOPLE: SOMEWHAT DIFFICULT
SUM OF ALL RESPONSES TO PHQ QUESTIONS 1-9: 5
SUM OF ALL RESPONSES TO PHQ QUESTIONS 1-9: 5

## 2024-09-24 ASSESSMENT — PAIN SCALES - GENERAL: PAINLEVEL: NO PAIN (0)

## 2024-09-24 NOTE — LETTER
September 24, 2024      RE: Maddy Ortiz  [address]  [facility] [address]        To Whom It May Concern,       Maddy Ortiz is a patient of mine. She has chronic knee pain and chronic back pain. Due to these medical conditions, I recommend that she has a first floor apartment.        Sincerely,          PHILL Luo CNP

## 2024-09-24 NOTE — PROGRESS NOTES
Assessment & Plan     Type 2 diabetes mellitus without complication, without long-term current use of insulin (H)  Well-controlled.  Continue Victoza  - HEMOGLOBIN A1C; Future  - HEMOGLOBIN A1C    Acquired hypothyroidism  Well-controlled.  Continue levothyroxine at current dose.  - TSH; Future  - TSH    Chronic pain of both knees  Letter written per patient request.      Blood sugar testing frequency justification:  Patient modifying lifestyle changes (diet, exercise) with blood sugars      The risks, benefits and treatment options of prescribed medications or other treatments have been discussed with the patient. The patient verbalized their understanding and should call or follow up if no improvement or if they develop further problems.  Shirley Freeman, CNP                  Subjective   Rachel is a 38 year old, presenting for the following health issues:  Sore (Sore on right hip, x 6 days, wanting to make sure it is okay prior to surgery 9/26/24.  No drainage, no redness or inflammation.  ) and Letter Request (Letter for accomodation for housing - would like 1st floor unit due to knee and back pain, also would like discharge plan sent to homemaking company that will be helping her after her surgery )        9/24/2024    10:40 AM   Additional Questions   Roomed by Kaelyn SEPULVEDA   Accompanied by self     History of Present Illness       Back Pain:  She presents for follow up of back pain. Patient's back pain is a chronic problem.  Location of back pain:  Right lower back, right middle of back, right upper back, right shoulder, right buttock, right hip, right side of waist and other  Description of back pain: sharp, shooting and stabbing  Back pain spreads: right buttocks, right knee, left knee, right shoulder and right side of neck    Since patient first noticed back pain, pain is: gradually worsening  Does back pain interfere with her job:  Not applicable       Diabetes:   She presents for follow up of diabetes.  She  "is checking home blood glucose two times daily.   She checks blood glucose before meals.  Blood glucose is sometimes over 200 and sometimes under 70. She is aware of hypoglycemia symptoms including shakiness, dizziness, weakness, lethargy and confusion.   She is concerned about blood sugar frequently over 200.   She is having numbness in feet and excessive thirst.            Reason for visit:  1. to check sore diabetes 2. send discharge plan from surgery to homemaking company to plan my home visits 3. to get a note sent to my landlord about getting a first floor unit (pain since 2015 10years of knee pain about steps several series of PT still    She eats 2-3 servings of fruits and vegetables daily.She consumes 0 sweetened beverage(s) daily.She exercises with enough effort to increase her heart rate 30 to 60 minutes per day.  She exercises with enough effort to increase her heart rate 7 days per week.   She is taking medications regularly.     Chief Complaint   Patient presents with    Sore     Sore on right hip, x 6 days, wanting to make sure it is okay prior to surgery 9/26/24.    No drainage, no redness or inflammation.  No pain.  She believes this started from clothing rubbing on the area      Letter Request     Letter for accomodation for housing - would like 1st floor unit due to knee and back pain, also would like discharge plan sent to homemaking company that will be helping her after her surgery          Hypothyroidism Follow-up    Since last visit, patient describes the following symptoms: Weight stable, no hair loss, no skin changes, no constipation, no loose stools          Review of Systems  Constitutional, HEENT, cardiovascular, pulmonary, gi and gu systems are negative, except as otherwise noted.      Objective    /78 (BP Location: Right arm, Patient Position: Chair, Cuff Size: Adult Large)   Pulse 74   Temp 97.5  F (36.4  C) (Tympanic)   Resp 16   Ht 1.645 m (5' 4.75\")   Wt 104.7 kg (230 lb " 14.4 oz)   LMP  (LMP Unknown)   SpO2 97%   BMI 38.72 kg/m    Body mass index is 38.72 kg/m .  Physical Exam   GENERAL: alert and no distress  NECK: no adenopathy, no asymmetry, masses, or scars  RESP: lungs clear to auscultation - no rales, rhonchi or wheezes  CV: regular rate and rhythm, normal S1 S2, no S3 or S4, no murmur, click or rub, no peripheral edema  ABDOMEN: soft, nontender, no hepatosplenomegaly, no masses and bowel sounds normal  MS: no gross musculoskeletal defects noted, no edema  SKIN: Over the right anterior iliac crest there is a 7 mm superficial erythematous area with peeling skin over the top.  No tenderness, no fluctuance, no drainage.    Results for orders placed or performed in visit on 09/24/24 (from the past 24 hour(s))   HEMOGLOBIN A1C   Result Value Ref Range    Estimated Average Glucose 117 (H) <117 mg/dL    Hemoglobin A1C 5.7 (H) 0.0 - 5.6 %   TSH   Result Value Ref Range    TSH 2.16 0.30 - 4.20 uIU/mL     *Note: Due to a large number of results and/or encounters for the requested time period, some results have not been displayed. A complete set of results can be found in Results Review.           The longitudinal plan of care for the diagnosis(es)/condition(s) as documented were addressed during this visit. Due to the added complexity in care, I will continue to support Rachel in the subsequent management and with ongoing continuity of care.    Signed Electronically by: PHILL Luo CNP

## 2024-09-24 NOTE — ANESTHESIA PREPROCEDURE EVALUATION
Anesthesia Pre-Procedure Evaluation    Patient: Maddy Ortiz   MRN: [id number] : 1985        Procedure : Procedure(s):  HERNIORRHAPHY, UMBILICAL, ROBOT-ASSISTED, LAPAROSCOPIC, USING DA MERCEDES XI          Past Medical History:   Diagnosis Date     Bipolar 1 disorder (H) 2012     Depressive disorder      Diabetes mellitus, type 2 (H) 2021     Paranoid type delusional disorder (H) 2012      Past Surgical History:   Procedure Laterality Date     TONSILLECTOMY & ADENOIDECTOMY      as a child     TONSILLECTOMY & ADENOIDECTOMY        Allergies   Allergen Reactions     Dust Mites Shortness Of Breath     Animal Dander      Other reaction(s): *Unknown     Mold      Other reaction(s): Runny Nose     Trees       Social History     Tobacco Use     Smoking status: Former     Current packs/day: 0.50     Types: Cigarettes     Smokeless tobacco: Former     Tobacco comments:     around 2nd hand smoke   Substance Use Topics     Alcohol use: Not Currently      Wt Readings from Last 1 Encounters:   24 104.7 kg (230 lb 14.4 oz)        Anesthesia Evaluation   Pt has had prior anesthetic. Type: General.    No history of anesthetic complications       ROS/MED HX  ENT/Pulmonary:     (+)           allergic rhinitis,     tobacco use, Past use,                       Neurologic:  - neg neurologic ROS     Cardiovascular:  - neg cardiovascular ROS   (+)  - -   -  - -                                 Previous cardiac testing   Echo: Date: Results:    Stress Test:  Date: Results:    ECG Reviewed:  Date:  Results:  Sinus  Rhythm   WITHIN NORMAL LIMITS  Cath:  Date: Results:      METS/Exercise Tolerance:     Hematologic:  - neg hematologic  ROS     Musculoskeletal:   (+)  arthritis,             GI/Hepatic:     (+) GERD,            liver disease,       Renal/Genitourinary:  - neg Renal ROS     Endo: Comment: Morbid obesity    (+)  type II DM, Last HgA1c: 5.7, date: , Using insulin, - not using insulin pump.     "thyroid problem, hypothyroidism,    Obesity,       Psychiatric/Substance Use:     (+) psychiatric history bipolar, depression, other (comment), anxiety and schizophrenia       Infectious Disease:  - neg infectious disease ROS     Malignancy:  - neg malignancy ROS     Other:  - neg other ROS    (+)  , H/O Chronic Pain,         Physical Exam    Airway        Mallampati: I       Respiratory Devices and Support         Dental       (+) Modest Abnormalities - crowns, retainers, 1 or 2 missing teeth      Cardiovascular             Pulmonary               OUTSIDE LABS:  CBC:   Lab Results   Component Value Date    WBC 9.4 08/28/2024    WBC 9.0 06/08/2024    HGB 14.0 08/28/2024    HGB 14.7 06/08/2024    HCT 43.9 08/28/2024    HCT 43.7 06/08/2024     08/28/2024     06/08/2024     BMP:   Lab Results   Component Value Date     08/28/2024     06/08/2024    POTASSIUM 4.1 08/28/2024    POTASSIUM 3.8 06/08/2024    CHLORIDE 108 (H) 08/28/2024    CHLORIDE 103 06/08/2024    CO2 23 08/28/2024    CO2 24 06/08/2024    BUN 14.0 08/28/2024    BUN 14.3 06/08/2024    CR 0.82 08/28/2024    CR 0.91 06/08/2024     (H) 08/28/2024    GLC 75 06/08/2024     COAGS: No results found for: \"PTT\", \"INR\", \"FIBR\"  POC:   Lab Results   Component Value Date    HCG Negative 12/31/2022    HCGS Negative 06/08/2024     HEPATIC:   Lab Results   Component Value Date    ALBUMIN 4.5 06/08/2024    PROTTOTAL 7.4 06/08/2024    ALT 36 06/08/2024    AST 27 06/08/2024    ALKPHOS 89 06/08/2024    BILITOTAL 0.3 06/08/2024     OTHER:   Lab Results   Component Value Date    LACT 1.0 07/03/2022    A1C 5.7 (H) 09/24/2024    URSULA 9.6 08/28/2024    MAG 2.4 (H) 07/22/2022    LIPASE 181 07/03/2022    TSH 2.16 09/24/2024    T4 1.08 06/17/2022    CRP 10.0 (H) 01/10/2022       Anesthesia Plan    ASA Status:  3    NPO Status:  NPO Appropriate    Anesthesia Type: General.     - Airway: ETT   Induction: Intravenous.   Maintenance: Balanced.    " "    Consents    Anesthesia Plan(s) and associated risks, benefits, and realistic alternatives discussed. Questions answered and patient/representative(s) expressed understanding.     - Discussed: Risks, Benefits and Alternatives for BOTH SEDATION and the PROCEDURE were discussed     - Discussed with:  Patient            Postoperative Care    Pain management: IV analgesics, Oral pain medications, Multi-modal analgesia.   PONV prophylaxis: Ondansetron (or other 5HT-3), Dexamethasone or Solumedrol     Comments:               Viktor Weeks, APRN CRNA    I have reviewed the pertinent notes and labs in the chart from the past 30 days and (re)examined the patient.  Any updates or changes from those notes are reflected in this note.              # Obesity: Estimated body mass index is 38.72 kg/m  as calculated from the following:    Height as of 9/24/24: 1.645 m (5' 4.75\").    Weight as of 9/24/24: 104.7 kg (230 lb 14.4 oz).      "

## 2024-09-24 NOTE — TELEPHONE ENCOUNTER
Pt calls to see if she can receive her Risperdal injections in the primary care clinic. Says that they're ordered by her psychiatrist with [facility]. Per management, pt is advised that the medication would have to be ordered and managed by her PCP, so she's not able to receive this injection here. She verbalized understanding.    Mela Plasencia RN  [facility]

## 2024-09-25 ENCOUNTER — MYC MEDICAL ADVICE (OUTPATIENT)
Dept: FAMILY MEDICINE | Facility: CLINIC | Age: 39
End: 2024-09-25
Payer: COMMERCIAL

## 2024-09-26 ENCOUNTER — ANESTHESIA (OUTPATIENT)
Dept: SURGERY | Facility: CLINIC | Age: 39
End: 2024-09-26
Payer: COMMERCIAL

## 2024-09-26 ENCOUNTER — HOSPITAL ENCOUNTER (OUTPATIENT)
Facility: CLINIC | Age: 39
Discharge: HOME | End: 2024-09-26
Attending: SURGERY | Admitting: SURGERY
Payer: COMMERCIAL

## 2024-09-26 VITALS
HEART RATE: 91 BPM | DIASTOLIC BLOOD PRESSURE: 87 MMHG | TEMPERATURE: 98.2 F | RESPIRATION RATE: 14 BRPM | SYSTOLIC BLOOD PRESSURE: 125 MMHG | OXYGEN SATURATION: 98 %

## 2024-09-26 DIAGNOSIS — K42.9 UMBILICAL HERNIA WITHOUT OBSTRUCTION AND WITHOUT GANGRENE: Primary | ICD-10-CM

## 2024-09-26 LAB
GLUCOSE BLDC GLUCOMTR-MCNC: 108 MG/DL (ref 70–99)
GLUCOSE BLDC GLUCOMTR-MCNC: 143 MG/DL (ref 70–99)

## 2024-09-26 PROCEDURE — 250N000011 HC RX IP 250 OP 636: Performed by: SURGERY

## 2024-09-26 PROCEDURE — 272N000001 HC OR GENERAL SUPPLY STERILE: Performed by: SURGERY

## 2024-09-26 PROCEDURE — 250N000009 HC RX 250

## 2024-09-26 PROCEDURE — 250N000013 HC RX MED GY IP 250 OP 250 PS 637: Performed by: PHYSICIAN ASSISTANT

## 2024-09-26 PROCEDURE — 999N000141 HC STATISTIC PRE-PROCEDURE NURSING ASSESSMENT: Performed by: SURGERY

## 2024-09-26 PROCEDURE — 710N000012 HC RECOVERY PHASE 2, PER MINUTE: Performed by: SURGERY

## 2024-09-26 PROCEDURE — 250N000013 HC RX MED GY IP 250 OP 250 PS 637: Performed by: SURGERY

## 2024-09-26 PROCEDURE — 360N000080 HC SURGERY LEVEL 7, PER MIN: Performed by: SURGERY

## 2024-09-26 PROCEDURE — 250N000011 HC RX IP 250 OP 636: Performed by: NURSE ANESTHETIST, CERTIFIED REGISTERED

## 2024-09-26 PROCEDURE — 250N000009 HC RX 250: Performed by: NURSE ANESTHETIST, CERTIFIED REGISTERED

## 2024-09-26 PROCEDURE — 82962 GLUCOSE BLOOD TEST: CPT

## 2024-09-26 PROCEDURE — 370N000017 HC ANESTHESIA TECHNICAL FEE, PER MIN: Performed by: SURGERY

## 2024-09-26 PROCEDURE — 250N000009 HC RX 250: Performed by: SURGERY

## 2024-09-26 PROCEDURE — 49594 RPR AA HRN 1ST 3-10 NCR/STRN: CPT | Performed by: SURGERY

## 2024-09-26 PROCEDURE — 250N000025 HC SEVOFLURANE, PER MIN: Performed by: SURGERY

## 2024-09-26 PROCEDURE — C1781 MESH (IMPLANTABLE): HCPCS | Performed by: SURGERY

## 2024-09-26 PROCEDURE — 271N000001 HC OR GENERAL SUPPLY NON-STERILE: Performed by: SURGERY

## 2024-09-26 PROCEDURE — 258N000003 HC RX IP 258 OP 636

## 2024-09-26 PROCEDURE — 710N000009 HC RECOVERY PHASE 1, LEVEL 1, PER MIN: Performed by: SURGERY

## 2024-09-26 PROCEDURE — S2900 ROBOTIC SURGICAL SYSTEM: HCPCS | Performed by: SURGERY

## 2024-09-26 PROCEDURE — 250N000013 HC RX MED GY IP 250 OP 250 PS 637: Performed by: NURSE ANESTHETIST, CERTIFIED REGISTERED

## 2024-09-26 DEVICE — COMPOSITE MESH MONOFILAMENT POLYPROPYLENE MESH WITH ABSORBABLE SYNTHETIC FILM AND MARKING
Type: IMPLANTABLE DEVICE | Site: ABDOMEN | Status: FUNCTIONAL
Brand: PARIETENE DS

## 2024-09-26 RX ORDER — FENTANYL CITRATE 50 UG/ML
INJECTION, SOLUTION INTRAMUSCULAR; INTRAVENOUS PRN
Status: DISCONTINUED | OUTPATIENT
Start: 2024-09-26 | End: 2024-09-26

## 2024-09-26 RX ORDER — DOCUSATE SODIUM 100 MG/1
100 CAPSULE, LIQUID FILLED ORAL 2 TIMES DAILY PRN
Qty: 24 CAPSULE | Refills: 0 | Status: SHIPPED | OUTPATIENT
Start: 2024-09-26

## 2024-09-26 RX ORDER — CEFAZOLIN SODIUM/WATER 2 G/20 ML
2 SYRINGE (ML) INTRAVENOUS
Status: COMPLETED | OUTPATIENT
Start: 2024-09-26 | End: 2024-09-26

## 2024-09-26 RX ORDER — PROPOFOL 10 MG/ML
INJECTION, EMULSION INTRAVENOUS PRN
Status: DISCONTINUED | OUTPATIENT
Start: 2024-09-26 | End: 2024-09-26

## 2024-09-26 RX ORDER — NALOXONE HYDROCHLORIDE 0.4 MG/ML
0.1 INJECTION, SOLUTION INTRAMUSCULAR; INTRAVENOUS; SUBCUTANEOUS
Status: DISCONTINUED | OUTPATIENT
Start: 2024-09-26 | End: 2024-09-26 | Stop reason: HOSPADM

## 2024-09-26 RX ORDER — CELECOXIB 200 MG/1
400 CAPSULE ORAL ONCE
Status: COMPLETED | OUTPATIENT
Start: 2024-09-26 | End: 2024-09-26

## 2024-09-26 RX ORDER — ONDANSETRON 4 MG/1
4 TABLET, ORALLY DISINTEGRATING ORAL EVERY 30 MIN PRN
Status: DISCONTINUED | OUTPATIENT
Start: 2024-09-26 | End: 2024-09-26 | Stop reason: HOSPADM

## 2024-09-26 RX ORDER — LIDOCAINE HYDROCHLORIDE 20 MG/ML
INJECTION, SOLUTION INFILTRATION; PERINEURAL PRN
Status: DISCONTINUED | OUTPATIENT
Start: 2024-09-26 | End: 2024-09-26

## 2024-09-26 RX ORDER — OXYCODONE HYDROCHLORIDE 5 MG/1
10 TABLET ORAL EVERY 4 HOURS PRN
Status: DISCONTINUED | OUTPATIENT
Start: 2024-09-26 | End: 2024-09-26 | Stop reason: HOSPADM

## 2024-09-26 RX ORDER — SODIUM CHLORIDE, SODIUM LACTATE, POTASSIUM CHLORIDE, CALCIUM CHLORIDE 600; 310; 30; 20 MG/100ML; MG/100ML; MG/100ML; MG/100ML
INJECTION, SOLUTION INTRAVENOUS CONTINUOUS
Status: DISCONTINUED | OUTPATIENT
Start: 2024-09-26 | End: 2024-09-26 | Stop reason: HOSPADM

## 2024-09-26 RX ORDER — OXYCODONE HYDROCHLORIDE 5 MG/1
5 TABLET ORAL EVERY 4 HOURS PRN
Qty: 12 TABLET | Refills: 0 | Status: SHIPPED | OUTPATIENT
Start: 2024-09-26

## 2024-09-26 RX ORDER — CEFAZOLIN SODIUM/WATER 2 G/20 ML
2 SYRINGE (ML) INTRAVENOUS SEE ADMIN INSTRUCTIONS
Status: DISCONTINUED | OUTPATIENT
Start: 2024-09-26 | End: 2024-09-26 | Stop reason: HOSPADM

## 2024-09-26 RX ORDER — ONDANSETRON 2 MG/ML
INJECTION INTRAMUSCULAR; INTRAVENOUS PRN
Status: DISCONTINUED | OUTPATIENT
Start: 2024-09-26 | End: 2024-09-26

## 2024-09-26 RX ORDER — GABAPENTIN 600 MG/1
600 TABLET ORAL ONCE
Status: COMPLETED | OUTPATIENT
Start: 2024-09-26 | End: 2024-09-26

## 2024-09-26 RX ORDER — PROPOFOL 10 MG/ML
INJECTION, EMULSION INTRAVENOUS CONTINUOUS PRN
Status: DISCONTINUED | OUTPATIENT
Start: 2024-09-26 | End: 2024-09-26

## 2024-09-26 RX ORDER — KETAMINE HYDROCHLORIDE 10 MG/ML
INJECTION INTRAMUSCULAR; INTRAVENOUS PRN
Status: DISCONTINUED | OUTPATIENT
Start: 2024-09-26 | End: 2024-09-26

## 2024-09-26 RX ORDER — GLYCOPYRROLATE 0.2 MG/ML
INJECTION, SOLUTION INTRAMUSCULAR; INTRAVENOUS PRN
Status: DISCONTINUED | OUTPATIENT
Start: 2024-09-26 | End: 2024-09-26

## 2024-09-26 RX ORDER — KETOROLAC TROMETHAMINE 30 MG/ML
INJECTION, SOLUTION INTRAMUSCULAR; INTRAVENOUS PRN
Status: DISCONTINUED | OUTPATIENT
Start: 2024-09-26 | End: 2024-09-26

## 2024-09-26 RX ORDER — BUPIVACAINE HYDROCHLORIDE AND EPINEPHRINE 2.5; 5 MG/ML; UG/ML
INJECTION, SOLUTION INFILTRATION; PERINEURAL PRN
Status: DISCONTINUED | OUTPATIENT
Start: 2024-09-26 | End: 2024-09-26 | Stop reason: HOSPADM

## 2024-09-26 RX ORDER — DEXAMETHASONE SODIUM PHOSPHATE 4 MG/ML
INJECTION, SOLUTION INTRA-ARTICULAR; INTRALESIONAL; INTRAMUSCULAR; INTRAVENOUS; SOFT TISSUE PRN
Status: DISCONTINUED | OUTPATIENT
Start: 2024-09-26 | End: 2024-09-26

## 2024-09-26 RX ORDER — ONDANSETRON 2 MG/ML
4 INJECTION INTRAMUSCULAR; INTRAVENOUS EVERY 30 MIN PRN
Status: DISCONTINUED | OUTPATIENT
Start: 2024-09-26 | End: 2024-09-26 | Stop reason: HOSPADM

## 2024-09-26 RX ORDER — HEPARIN SODIUM 5000 [USP'U]/.5ML
5000 INJECTION, SOLUTION INTRAVENOUS; SUBCUTANEOUS
Status: COMPLETED | OUTPATIENT
Start: 2024-09-26 | End: 2024-09-26

## 2024-09-26 RX ORDER — HYDROMORPHONE HCL IN WATER/PF 6 MG/30 ML
0.2 PATIENT CONTROLLED ANALGESIA SYRINGE INTRAVENOUS EVERY 5 MIN PRN
Status: DISCONTINUED | OUTPATIENT
Start: 2024-09-26 | End: 2024-09-26 | Stop reason: HOSPADM

## 2024-09-26 RX ORDER — OXYCODONE HYDROCHLORIDE 5 MG/1
5 TABLET ORAL EVERY 4 HOURS PRN
Status: DISCONTINUED | OUTPATIENT
Start: 2024-09-26 | End: 2024-09-26 | Stop reason: HOSPADM

## 2024-09-26 RX ORDER — OXYCODONE HYDROCHLORIDE 5 MG/1
5 TABLET ORAL
Status: COMPLETED | OUTPATIENT
Start: 2024-09-26 | End: 2024-09-26

## 2024-09-26 RX ORDER — HYDROXYZINE HYDROCHLORIDE 25 MG/1
25 TABLET, FILM COATED ORAL EVERY 6 HOURS PRN
Status: DISCONTINUED | OUTPATIENT
Start: 2024-09-26 | End: 2024-09-26 | Stop reason: HOSPADM

## 2024-09-26 RX ORDER — IBUPROFEN 600 MG/1
600 TABLET, FILM COATED ORAL
Status: DISCONTINUED | OUTPATIENT
Start: 2024-09-26 | End: 2024-09-26 | Stop reason: HOSPADM

## 2024-09-26 RX ORDER — HYDROMORPHONE HCL IN WATER/PF 6 MG/30 ML
0.4 PATIENT CONTROLLED ANALGESIA SYRINGE INTRAVENOUS EVERY 5 MIN PRN
Status: DISCONTINUED | OUTPATIENT
Start: 2024-09-26 | End: 2024-09-26 | Stop reason: HOSPADM

## 2024-09-26 RX ORDER — LIDOCAINE 40 MG/G
CREAM TOPICAL
Status: DISCONTINUED | OUTPATIENT
Start: 2024-09-26 | End: 2024-09-26 | Stop reason: HOSPADM

## 2024-09-26 RX ORDER — MEPERIDINE HYDROCHLORIDE 25 MG/ML
INJECTION INTRAMUSCULAR; INTRAVENOUS; SUBCUTANEOUS PRN
Status: DISCONTINUED | OUTPATIENT
Start: 2024-09-26 | End: 2024-09-26

## 2024-09-26 RX ADMIN — MIDAZOLAM 1 MG: 1 INJECTION INTRAMUSCULAR; INTRAVENOUS at 15:17

## 2024-09-26 RX ADMIN — GLYCOPYRROLATE 0.1 MG: 0.2 INJECTION, SOLUTION INTRAMUSCULAR; INTRAVENOUS at 15:21

## 2024-09-26 RX ADMIN — KETOROLAC TROMETHAMINE 30 MG: 30 INJECTION, SOLUTION INTRAMUSCULAR at 17:21

## 2024-09-26 RX ADMIN — CELECOXIB 400 MG: 200 CAPSULE ORAL at 12:52

## 2024-09-26 RX ADMIN — SODIUM CHLORIDE, POTASSIUM CHLORIDE, SODIUM LACTATE AND CALCIUM CHLORIDE: 600; 310; 30; 20 INJECTION, SOLUTION INTRAVENOUS at 16:53

## 2024-09-26 RX ADMIN — SUGAMMADEX 200 MG: 100 INJECTION, SOLUTION INTRAVENOUS at 17:21

## 2024-09-26 RX ADMIN — LIDOCAINE HYDROCHLORIDE 5 ML: 20 INJECTION, SOLUTION INFILTRATION; PERINEURAL at 15:23

## 2024-09-26 RX ADMIN — KETAMINE HYDROCHLORIDE 30 MG: 10 INJECTION INTRAMUSCULAR; INTRAVENOUS at 15:42

## 2024-09-26 RX ADMIN — HEPARIN SODIUM 5000 UNITS: 10000 INJECTION, SOLUTION INTRAVENOUS; SUBCUTANEOUS at 15:36

## 2024-09-26 RX ADMIN — ROCURONIUM BROMIDE 40 MG: 50 INJECTION, SOLUTION INTRAVENOUS at 16:01

## 2024-09-26 RX ADMIN — KETAMINE HYDROCHLORIDE 20 MG: 10 INJECTION INTRAMUSCULAR; INTRAVENOUS at 15:48

## 2024-09-26 RX ADMIN — HYDROMORPHONE HYDROCHLORIDE 0.4 MG: 0.2 INJECTION, SOLUTION INTRAMUSCULAR; INTRAVENOUS; SUBCUTANEOUS at 19:01

## 2024-09-26 RX ADMIN — PROPOFOL 200 MG: 10 INJECTION, EMULSION INTRAVENOUS at 15:23

## 2024-09-26 RX ADMIN — DEXAMETHASONE SODIUM PHOSPHATE 4 MG: 4 INJECTION, SOLUTION INTRA-ARTICULAR; INTRALESIONAL; INTRAMUSCULAR; INTRAVENOUS; SOFT TISSUE at 15:21

## 2024-09-26 RX ADMIN — MIDAZOLAM 1 MG: 1 INJECTION INTRAMUSCULAR; INTRAVENOUS at 15:21

## 2024-09-26 RX ADMIN — FENTANYL CITRATE 50 MCG: 50 INJECTION INTRAMUSCULAR; INTRAVENOUS at 15:48

## 2024-09-26 RX ADMIN — PROPOFOL 100 MCG/KG/MIN: 10 INJECTION, EMULSION INTRAVENOUS at 15:23

## 2024-09-26 RX ADMIN — Medication 2 G: at 15:17

## 2024-09-26 RX ADMIN — SODIUM CHLORIDE, POTASSIUM CHLORIDE, SODIUM LACTATE AND CALCIUM CHLORIDE: 600; 310; 30; 20 INJECTION, SOLUTION INTRAVENOUS at 13:03

## 2024-09-26 RX ADMIN — LIDOCAINE HYDROCHLORIDE 0.1 ML: 10 INJECTION, SOLUTION EPIDURAL; INFILTRATION; INTRACAUDAL; PERINEURAL at 13:05

## 2024-09-26 RX ADMIN — HYDROMORPHONE HYDROCHLORIDE 0.4 MG: 0.2 INJECTION, SOLUTION INTRAMUSCULAR; INTRAVENOUS; SUBCUTANEOUS at 18:50

## 2024-09-26 RX ADMIN — OXYCODONE HYDROCHLORIDE 5 MG: 5 TABLET ORAL at 19:48

## 2024-09-26 RX ADMIN — FENTANYL CITRATE 50 MCG: 50 INJECTION INTRAMUSCULAR; INTRAVENOUS at 15:43

## 2024-09-26 RX ADMIN — ONDANSETRON 4 MG: 2 INJECTION INTRAMUSCULAR; INTRAVENOUS at 17:21

## 2024-09-26 RX ADMIN — GLYCOPYRROLATE 0.1 MG: 0.2 INJECTION, SOLUTION INTRAMUSCULAR; INTRAVENOUS at 15:17

## 2024-09-26 RX ADMIN — ROCURONIUM BROMIDE 60 MG: 50 INJECTION, SOLUTION INTRAVENOUS at 15:23

## 2024-09-26 RX ADMIN — FENTANYL CITRATE 100 MCG: 50 INJECTION INTRAMUSCULAR; INTRAVENOUS at 15:23

## 2024-09-26 RX ADMIN — HYDROXYZINE HYDROCHLORIDE 25 MG: 25 TABLET, FILM COATED ORAL at 18:51

## 2024-09-26 RX ADMIN — ROCURONIUM BROMIDE 20 MG: 50 INJECTION, SOLUTION INTRAVENOUS at 17:09

## 2024-09-26 RX ADMIN — GABAPENTIN 600 MG: 600 TABLET, FILM COATED ORAL at 12:52

## 2024-09-26 RX ADMIN — ROCURONIUM BROMIDE 30 MG: 50 INJECTION, SOLUTION INTRAVENOUS at 16:52

## 2024-09-26 RX ADMIN — MEPERIDINE HYDROCHLORIDE 25 MG: 25 INJECTION, SOLUTION INTRAMUSCULAR; INTRAVENOUS; SUBCUTANEOUS at 15:37

## 2024-09-26 ASSESSMENT — ACTIVITIES OF DAILY LIVING (ADL)
ADLS_ACUITY_SCORE: 29
ADLS_ACUITY_SCORE: 27
ADLS_ACUITY_SCORE: 29

## 2024-09-26 NOTE — OP NOTE
[facility]  Operative Note    Pre-operative diagnosis: Umbilical hernia without obstruction and without gangrene [K42.9]   Post-operative diagnosis Incarcerated supraumbilical ventral hernia 4x3cm; incarcerated infraumbilical ventral hernia 2x3cm  Total defect 8.5x3cm    Procedure: Procedure(s):  HERNIORRHAPHY, UMBILICAL, ROBOT-ASSISTED, LAPAROSCOPIC, USING DA MERCEDES XI   Surgeon: Shannon Mathias MD   Assistants(s): Lexie Nunes PA-C assistance was required for port placement, bedside robotic instrument exchanges, bedside needs for retraction and suction as needed, introduction/removal of sutures or mesh, and closure    Anesthesia: General    Estimated blood loss: Less than 10 ml                Drains: None   Specimens: None   Implants: [id number] Dextile mesh    Findings: 2 incarcerated ventral hernias as measured above.  The supraumbilical ventral hernia was filled with omentum.  The infraumbilical ventral hernia was filled with preperitoneal fat.  Total defect superior to inferior was 8.5x3cm with at least 2-3cm in between.   .   Complications: None.   Condition: Stable       Indications for the procedure:   This is a 39 yo F with large incarcerated ventral hernia.  Patient is symptomatic and would like it to be repaired.  We discussed risks, benefits, and alternatives of the procedure during the office visit.  Patient agreed and wishes to proceed with the procedures above.    Description of procedure:  The patient was preoperatively identified. Consent was signed and placed on the chart. Patient was brought back to the operative suite where patient was laid supine on the operating table. General endotracheal anesthesia was induced per anesthesia protocol. Patient was then prepped and draped in sterile fashion. We started by infiltrating 5 cc of local anesthetic in the left upper quadrant area and made small skin incision and accessing the abdominal cavity by using Optiview trocar and 5-mm  trocar site visualizing all layers of the abdominal wall until we reached the peritoneal cavity. We then insufflated  with CO2 gas to create pneumoperitoneum.  Brief glance of the abdomen noted incarcerated supraumbilical hernia and was filled with omentum; there was also a 2nd hernia several cms inferior to that.  An 8mm robotic trocar was then placed at the LLQ several centimeters medial to the ASIS.  And a 8 mm trocar was then place at the lateral aspect of the mid left abdomen.  A third 8 mm trocar at the left upper quadrant.  I break the bed at the center to elevate the left hip.  And the patient was rolled to her right side.  I then inserted a 0 stratafix, along with two 2-0 Stratafix all under direct visualization.   The robot was then brought in at a 90 degree fashion.  And it was docked.  Utilizing the Cadiere forceps and the monopolar scissors, I reduced all of the omentum from the hernia defect.  This required help from the bedside assistant pushing from above.  I then able to take down the majority of the hernia sac from the hernia defect..  I then took down the preperitoneal fat from the infra umbilical hernia.  This point there were 2 hernia defects.  The supraumbilical hernia was approximately 3 x 4 cm.  The infant supraumbilical hernia was 2 x 3 cm.  Total defect measurement is 8.5 x 3 cm..   At this point the monopolar scissors was exchanged for a Francisco suture cut.  An additional incision was made at the skin at the level of the larger defect.  12 mm trocar laparoscopically was then placed through the already large defect..  A 10 mm bag was then used to remove the large hernia sac.  I then primary repair the defect with the 0 strata fix.  I did have to use a second strata fix to primary repair the infraumbilical hernia.  I picked out a 10 x 15 cm Polypropylene Pareitenee DS mesh to get at least 3-4cm of overlap.  The mesh was placed through the 8 mm port under direct visualization.  Once I got a hold  of the mesh I grabbed onto the suture.  My assistant then place a PMI to grab onto that suture right through the very center of the defects at the umbilicus under direct supervision and visualization with the robot.  The mesh was then pulled against the abdominal wall secure with a curved stat by my assistant.  I then used 2-0 strata fix to circumferentially so in the mesh.  Once the mesh was sewn in place.  Several simple interrupted vicryl was used to further secure the mesh.  Noted hemostasis was achieved.  It was a good repair.  At this point our repair is complete.  All needles were removed under direct visualization and accounted for.  We then undocked the robot.  All incisions were closed with 4-0 Monocryl in a simple interrupted buried fashion. All counts were correct x2.  The patient tolerated the procedure well.    [facility] DO Mathias FACOS

## 2024-09-26 NOTE — ANESTHESIA CARE TRANSFER NOTE
Patient: Maddy Ortiz    Procedure: Procedure(s):  HERNIORRHAPHY, UMBILICAL, ROBOT-ASSISTED, LAPAROSCOPIC, USING DA MERCEDES XI       Diagnosis: Umbilical hernia without obstruction and without gangrene [K42.9]  Diagnosis Additional Information: No value filed.    Anesthesia Type:   General     Note:    Oropharynx: spontaneously breathing and oral airway in place  Level of Consciousness: awake and drowsy  Oxygen Supplementation: face mask  Level of Supplemental Oxygen (L/min / FiO2): 8  Independent Airway: airway patency satisfactory and stable  Dentition: dentition unchanged  Vital Signs Stable: post-procedure vital signs reviewed and stable  Report to RN Given: handoff report given  Patient transferred to: PACU    Handoff Report: Identifed the Patient, Identified the Reponsible Provider, Reviewed the pertinent medical history, Discussed the surgical course, Reviewed Intra-OP anesthesia mangement and issues during anesthesia, Set expectations for post-procedure period and Allowed opportunity for questions and acknowledgement of understanding  Vitals:  Vitals Value Taken Time   /85 09/26/24 1754   Temp     Pulse 89 09/26/24 1758   Resp 24 09/26/24 1758   SpO2 97 % 09/26/24 1758   Vitals shown include unfiled device data.    Electronically Signed By: PHILL Espinoza CRNA  September 26, 2024  6:00 PM

## 2024-09-26 NOTE — INTERVAL H&P NOTE
"I have reviewed the surgical (or preoperative) H&P that is linked to this encounter, and examined the patient. There are no significant changes    Clinical Conditions Present on Arrival:  Clinically Significant Risk Factors Present on Admission                      # Obesity: Estimated body mass index is 38.72 kg/m  as calculated from the following:    Height as of 9/24/24: 1.645 m (5' 4.75\").    Weight as of 9/24/24: 104.7 kg (230 lb 14.4 oz).       "

## 2024-09-26 NOTE — OR NURSING
Pt here with dad. Pt with very flat affect and no emotion and very quiet but answered questions appropiately. No pain and cooperative but eyes closed until she had to sign ipad consent. Preop meds given. Glucose 108. Dad at bedside quiet and on ipad. Pt aware to void preop.

## 2024-09-26 NOTE — DISCHARGE INSTRUCTIONS
Home Care Following Hernia Repair    [facility] Mathias, DO      Hernia Type:  Umbilical    It is not uncommon to have urinary retention after an inguinal hernia repair.  This is a condition where your bladder cannot relax therefore you cannot urinate after surgery.  If this continues to persist several hours after surgery, please go to the ER for further evaluation.  Please have the ER placed a Jessica catheter and leave in the bladder for at least 5 days or until your follow-up.  Pain meds:   600mg ibuprofen every 6hrs prn   650mg of acetaminophen every 6hrs prn  You can alternate these meds so that you take one every 3 hrs.    Make sure you do not go over 4000mg of acetaminophen every 24hrs, especially if you are taking Norco or percocet 5/325mg.    Care of the Incision:  Remove gauze dressing (if present) after 48 hours.  If surgical glue was used, keep your incision dry for 24 hours.  Then you may shower, but don t submerge under water for at least 2 weeks.  Gently pat your incision dry with a freshly laundered towel.  Do not touch your incision with bare hands or pick at scabs.  Leave your incision open to air.  Cover it only if clothing rubs or irritates it.  Remove the gauze and clear tape from the belly button 48 hrs after surgery, if present.   Wear your abdominal binder, if given, while awake for the first 2-4 weeks.   Activity:  Gradually increase your activity.  Walk short distances several times each day and increase the distance as your strength allows.  To promote circulation, do not cross your legs while sitting.  No strenuous lifting or straining for 2-3 weeks.   Do not lift anything over 10-20 pounds until your doctor approves an increase.  Return to work will be determined by the type of work you do and should be discussed with your physician.  Do not drive or operate equipment while taking prescription pain medicines.  You may drive 1 week after surgery if you have stopped taking prescription pain  medicines and are pain-free enough to react quickly and make an emergency stop if necessary.    Diet:  Return to the diet you were on before surgery.  Drink plenty of  water.  Avoid foods that cause constipation.    REMEMBER--most prescription pain pills cause constipation.  Walking, extra fluids, and increased fiber (fresh fruits and vegetables, etc.) are natural remedies for constipation.  You can also take mineral oil, 1-2 Tablespoons per day.  If still constipated you may try a stool softener such as Colace or Miralax.    Call Your Physician if You Have:  Redness, increased swelling or cloudy drainage from your incision.  A temperature of more than 101 degrees F.  Worsening pain in your incision not relieved by your prescription pain pills and/or a short rest.  Any questions or concerns about your recovery, please call  [facility] at [phone]      Follow-up Care:  Make an appointment 1-2 week after your surgery.  Call [facility] at [phone]                          Same Day Surgery Discharge Instructions  Special Precautions After Surgery - Adult    It is not unusual to feel lightheaded or faint, up to 24 hours after surgery or while taking pain medication.  If you have these symptoms; sit for a few minutes before standing and have someone assist you when getting up.  You should rest and relax for the next 24 hours and must have someone stay with you for at least 24 hours after your discharge.  DO NOT DRIVE any vehicle or operate mechanical equipment for 24 hours following the end of your surgery.  DO NOT DRIVE while taking narcotic pain medications that have been prescribed by your physician.  If you had a limb operated on, you must be able to use it fully to drive.  DO NOT drink alcoholic beverages for 24 hours following surgery or while taking prescription pain medication.  Drink clear liquids (apple juice, ginger ale, broth, 7-Up, etc.).  Progress to your regular  diet as you feel able.  Any questions call your physician and do not make important decisions for 24 hours.    Nausea and Vomiting: Nausea and vomiting can occur any time after receiving anesthesia. If you experience nausea and vomiting we encourage you to move to a clear liquid diet and advance your diet as tolerated. If nausea and vomiting do not improve within 12 hours please call the surgeon or present to the Emergency department.     Break-through Bleeding: If your experience bleeding from your surgical site apply pressure and additional dressing per nurse instruction. For simple problems such as a saturated dressing, you may need to reinforce the dressing with more gauze and tape and put slight pressure on the site. If bleeding does not subside contact the surgeon or present to the Emergency Department.    Post-op Infection: If you develop a fever of 100.4 or greater, have pus like drainage, redness, swelling or severe pain at the surgical site not alleviated with pain medications; please contact the surgeon or present to the Emergency Department.     Medications:  Tylenol next at: anytime  Ibuprofen next at 11:30pm  Oxycodone last dose at 7:30pm  Follow the instructions on the bottle.  __________________________________________________________________________________________________________________________________  IMPORTANT NUMBERS:    [facility] Main Number:  [phone], [phone]  Pharmacy:  [phone]  Same Day Surgery:  [phone], for general post-op questions call Monday - Thursday until 8:30 p.m., Fridays until 6:00 p.m.   General Surgery:  [phone]  Specialty [facility]: [phone]

## 2024-09-27 NOTE — ANESTHESIA POSTPROCEDURE EVALUATION
Patient: Maddy Ortiz    Procedure: Procedure(s):  HERNIORRHAPHY, UMBILICAL, ROBOT-ASSISTED, LAPAROSCOPIC, USING DA MERCEDES XI       Anesthesia Type:  General    Note:  Disposition: Outpatient   Postop Pain Control: Uneventful            Sign Out: Well controlled pain   PONV: No   Neuro/Psych: Uneventful            Sign Out: Acceptable/Baseline neuro status   Airway/Respiratory: Uneventful            Sign Out: Acceptable/Baseline resp. status   CV/Hemodynamics: Uneventful            Sign Out: Acceptable CV status; No obvious hypovolemia; No obvious fluid overload   Other NRE: NONE   DID A NON-ROUTINE EVENT OCCUR? No           Last vitals:  Vitals Value Taken Time   /82 09/26/24 1929   Temp 36.8  C (98.2  F) 09/26/24 1928   Pulse 86 09/26/24 1930   Resp 12 09/26/24 1930   SpO2 94 % 09/26/24 1930   Vitals shown include unfiled device data.    Electronically Signed By: PHILL Holley CRNA  September 26, 2024  8:12 PM  
no

## 2024-10-01 NOTE — PROGRESS NOTES
[facility] Non Surgical Follow up Visit   Date of visit: 10/9/2024  Physician: LONDON Serna MD, MD  Primary Care is Shirley Freeman.  Maddy Ortiz   38 year old  female     Initial Weight: 202#  Initial BMI: 34.14  Today's Weight:   Wt Readings from Last 1 Encounters:   10/09/24 103 kg (227 lb)     Body mass index is 40.21 kg/m .           Assessment and Plan   Assessment: Maddy is a 38 year old year old female who presents for medical weight management.      Plan:    1. Morbid obesity (H)  We discussed healthy habits to assist with weight loss. She will work on planning meals ahead of time using the plate method for portion control and macronutrient proportions. She will look at nutrition information when deciding on which mom's meals to pick. She will work on decreasing starches and increasing lean protein. She will increase her walking until she is given the ok to going back to other exercise. She will continue liraglutide. I will send a message to her primary to see if she would be comfortable with raising the dose to 1.8 mg again.     2. Type 2 diabetes mellitus without complication, without long-term current use of insulin (H)  This may improve with healthy habits and weight loss. She will continue liraglutide.    3. Fatty liver  This may improve with healthy habits and weight loss.      Follow up in 1-2 month with the dietician and in 4 months with myself            INTERIM HISTORY  Patient is taking naltrexone and liraglutide for diabetes and appetite and craving control and she thinks it is helping. She had a hernia repair in September. She has some activity restrictions. She has been walking 2 blocks most days. She is taking liraglutide and it is helping to control her appetite. She was taking 1.8 mg weekly but she thought it was causing her blood sugars to go too low. She was eating lots of pasta at that time and not enough protein. She states when she stopped it for surgery she gained  15#. She struggles with hunger because of her psyche meds, even with the victoza. She was grazing when she was off of the victoza.    DIETARY HISTORY  Meals Per Day: 2-3  Eating Protein First?: no  Food Diary: B:Mom's meal: berries and oats and potatoes and cheese and eggs L:skips when she eats a late breakfast D:3:00: meat and pasta or potatoes and vegetables then will sometimes have a second dinner of pasta  Fluid Intake: 40-60 oz water  Portion Control: patient thinks so  Calorie Containing Beverages: none      Positive Changes Since Last Visit: smaller portions and less snacking  Struggling With: exercise, high starch intake    Knowledgeable in Reading Food Labels: not sure  Getting Adequate Protein: no    PHYSICAL ACTIVITY PATTERNS:  Very minimal    REVIEW OF SYSTEMS  GENERAL/CONSTITUTIONAL:  Fatigue: yes  GI:  Pancreatitis: no  MUSCULOSKELETAL/RHEUMATOLOGIC  Arthralgias: yes  Myalgias: yes  ENDOCRINE:  Monitoring Blood Sugars: yes  Sugars Well Controlled: yes  No personal or family history of medullary thyroid cancer no  No personal or family history of MEN2   :  Birth control: IUD       Patient Profile   Social History     Social History Narrative    One child    Education: some college        02/29/24        ENVIRONMENTAL HISTORY: [place] The home is heated with a electric furnace. They do not have central air conditioning, does have box air conditioner in the wall and has air purifier. The patient's bedroom is furnished with stuffed animals in bed, carpeting in bedroom and fabric window coverings. Pets inside the apartment includes 1 cat. There is history of cockroach or mice infestation. There are no smokers in the house.  The apartment does not have a basement.             Past Medical History   Past Medical History:   Diagnosis Date    Bipolar 1 disorder (H) 12/6/2012    Depressive disorder     Diabetes mellitus, type 2 (H) 12/13/2021    Paranoid  "type delusional disorder (H) 11/14/2012     Patient Active Problem List   Diagnosis    Animal dander allergy    CARDIOVASCULAR SCREENING; LDL GOAL LESS THAN 160    Psychosis (H)    Paranoid type delusional disorder (H)    Bipolar 1 disorder (H)    Insomnia    Depression with anxiety    Seasonal allergic rhinitis due to pollen    Allergic rhinitis due to mold    Allergic rhinitis due to animal dander    Allergic rhinitis due to dust mite    Encounter for IUD insertion    Schizoaffective disorder, bipolar type (H)    Gastroesophageal reflux disease without esophagitis    Paranoia (psychosis) (H)    Morbid obesity (H)    Schizoaffective disorder (H)    Umbilical hernia without obstruction and without gangrene    Fatty liver    Type 2 diabetes mellitus without complication, without long-term current use of insulin (H)    Elevated LFTs    Disorganized behavior    Infection due to 2019 novel coronavirus    Polypharmacy    Sedated due to multiple medications    Overdose, undetermined intent, initial encounter    Segmental and somatic dysfunction    SI (sacroiliac) joint dysfunction    mirena IUD 9/30/22    Neck pain    Hip pain, right    Chronic pain of both knees    Difficulty communicating    Difficulty using pragmatics in communication    Acquired hypothyroidism       Past Surgical History  She has a past surgical history that includes tonsillectomy & adenoidectomy; tonsillectomy & adenoidectomy; and Herniorrhaphy, Umbilical, Robot-Assisted, Laparoscopic, Using Da Maeve Xi (N/A, 9/26/2024).     Examination   Ht 1.6 m (5' 3\")   Wt 103 kg (227 lb)   BMI 40.21 kg/m    Wt Readings from Last 4 Encounters:   10/09/24 103 kg (227 lb)   09/24/24 104.7 kg (230 lb 14.4 oz)   08/28/24 104.2 kg (229 lb 11.2 oz)   08/13/24 101.3 kg (223 lb 6.4 oz)      BP Readings from Last 3 Encounters:   09/26/24 125/87   09/24/24 120/78   08/28/24 110/72      GENERAL: alert and no distress  EYES: Eyes grossly normal to inspection.  No discharge " or erythema, or obvious scleral/conjunctival abnormalities.  RESP: No audible wheeze, cough, or visible cyanosis.    SKIN: Visible skin clear. No significant rash, abnormal pigmentation or lesions.  NEURO: Cranial nerves grossly intact.  Mentation and speech appropriate for age.  PSYCH: Appropriate affect, tone, and pace of words        Counseling:   We reviewed the important post op bariatric recommendations:  -eating 3 meals daily  -eating protein first, getting >60gm protein daily  -eating slowly, chewing food well  -avoiding/limiting calorie containing beverages  -limiting starchy vegetables and carbohydrates, choosing wheat, not white with breads,   crackers, pastas, marquita, bagels, tortillas, rice  -limiting restaurant or cafeteria eating to twice a week or less    We discussed the importance of restorative sleep and stress management in maintaining a healthy weight.  We discussed the National Weight Control Registry healthy weight maintenance strategies and ways to optimize metabolism.  We discussed the importance of physical activity including cardiovascular and strength training in maintaining a healthier weight.    Total time spent on the date of this encounter doing: chart review, review of test results, patient visit, physical exam, education, counseling, developing plan of care and documenting = 49 minutes.         LONDON Serna MD  [facility]

## 2024-10-09 ENCOUNTER — VIRTUAL VISIT (OUTPATIENT)
Dept: SURGERY | Facility: CLINIC | Age: 39
End: 2024-10-09
Payer: COMMERCIAL

## 2024-10-09 VITALS — WEIGHT: 227 LBS | HEIGHT: 63 IN | BODY MASS INDEX: 40.22 KG/M2

## 2024-10-09 DIAGNOSIS — E66.01 MORBID OBESITY (H): Primary | ICD-10-CM

## 2024-10-09 DIAGNOSIS — K76.0 FATTY LIVER: ICD-10-CM

## 2024-10-09 DIAGNOSIS — E11.9 TYPE 2 DIABETES MELLITUS WITHOUT COMPLICATION, WITHOUT LONG-TERM CURRENT USE OF INSULIN (H): ICD-10-CM

## 2024-10-09 DIAGNOSIS — E11.9 TYPE 2 DIABETES MELLITUS WITHOUT COMPLICATION, WITHOUT LONG-TERM CURRENT USE OF INSULIN (H): Primary | ICD-10-CM

## 2024-10-09 PROCEDURE — 99215 OFFICE O/P EST HI 40 MIN: CPT | Mod: 95 | Performed by: FAMILY MEDICINE

## 2024-10-09 RX ORDER — LIRAGLUTIDE 6 MG/ML
1.8 INJECTION SUBCUTANEOUS DAILY
Qty: 9 ML | Refills: 1 | Status: SHIPPED | OUTPATIENT
Start: 2024-10-09 | End: 2025-01-07

## 2024-10-09 ASSESSMENT — PAIN SCALES - GENERAL: PAINLEVEL: NO PAIN (0)

## 2024-10-09 NOTE — NURSING NOTE
Current patient location: home     Is the patient currently in the state of [place]? YES    Visit mode:VIDEO    If the visit is dropped, the patient can be reconnected by: VIDEO VISIT: Text to cell phone:   Telephone Information:   Mobile [phone]       Will anyone else be joining the visit? NO  (If patient encounters technical issues they should call [phone]    Are changes needed to the allergy or medication list?  Yes, please remove meds not marked.     Are refills needed on medications prescribed by this physician? NO    Rooming Documentation:  Questionnaire(s) completed      Reason for visit: RECHECK    Wt other than 24 hrs: 10/4   Pain more than one location:  no  Candy ROWLAND

## 2024-10-09 NOTE — PROGRESS NOTES
Virtual Visit Details    Type of service:  Video Visit   Video Start Time: 1:43 PM  Video End Time:2:06 PM    Originating Location (pt. Location): Home    Distant Location (provider location):  Off-site  Platform used for Video Visit: Perri

## 2024-10-09 NOTE — PATIENT INSTRUCTIONS

## 2024-10-09 NOTE — LETTER
10/9/2024      Maddy Ortiz  [address]  [facility] [address]      Dear Colleague,    Thank you for referring your patient, Maddy Ortiz, to the [facility] AND [facility] [place]. Please see a copy of my visit note below.    Bariatric Care Clinic Non Surgical Follow up Visit   Date of visit: 10/9/2024  Physician: LONDON Serna MD, MD  Primary Care is Shirley Freeman.  Maddy Ortiz   38 year old  female     Initial Weight: 202#  Initial BMI: 34.14  Today's Weight:   Wt Readings from Last 1 Encounters:   10/09/24 103 kg (227 lb)     Body mass index is 40.21 kg/m .           Assessment and Plan   Assessment: Maddy is a 38 year old year old female who presents for medical weight management.      Plan:    1. Morbid obesity (H)  We discussed healthy habits to assist with weight loss. She will work on planning meals ahead of time using the plate method for portion control and macronutrient proportions. She will look at nutrition information when deciding on which mom's meals to pick. She will work on decreasing starches and increasing lean protein. She will increase her walking until she is given the ok to going back to other exercise. She will continue liraglutide. I will send a message to her primary to see if she would be comfortable with raising the dose to 1.8 mg again.     2. Type 2 diabetes mellitus without complication, without long-term current use of insulin (H)  This may improve with healthy habits and weight loss. She will continue liraglutide.    3. Fatty liver  This may improve with healthy habits and weight loss.      Follow up in 1-2 month with the dietician and in 4 months with myself            INTERIM HISTORY  Patient is taking naltrexone and liraglutide for diabetes and appetite and craving control and she thinks it is helping. She had a hernia repair in September. She has some activity restrictions. She has been walking 2 blocks most days. She is  taking liraglutide and it is helping to control her appetite. She was taking 1.8 mg weekly but she thought it was causing her blood sugars to go too low. She was eating lots of pasta at that time and not enough protein. She states when she stopped it for surgery she gained 15#. She struggles with hunger because of her psyche meds, even with the victoza. She was grazing when she was off of the victoza.    DIETARY HISTORY  Meals Per Day: 2-3  Eating Protein First?: no  Food Diary: B:Mom's meal: berries and oats and potatoes and cheese and eggs L:skips when she eats a late breakfast D:3:00: meat and pasta or potatoes and vegetables then will sometimes have a second dinner of pasta  Fluid Intake: 40-60 oz water  Portion Control: patient thinks so  Calorie Containing Beverages: none      Positive Changes Since Last Visit: smaller portions and less snacking  Struggling With: exercise, high starch intake    Knowledgeable in Reading Food Labels: not sure  Getting Adequate Protein: no    PHYSICAL ACTIVITY PATTERNS:  Very minimal    REVIEW OF SYSTEMS  GENERAL/CONSTITUTIONAL:  Fatigue: yes  GI:  Pancreatitis: no  MUSCULOSKELETAL/RHEUMATOLOGIC  Arthralgias: yes  Myalgias: yes  ENDOCRINE:  Monitoring Blood Sugars: yes  Sugars Well Controlled: yes  No personal or family history of medullary thyroid cancer no  No personal or family history of MEN2   :  Birth control: IUD       Patient Profile   Social History     Social History Narrative    One child    Education: some college        02/29/24        ENVIRONMENTAL HISTORY: [place] The home is heated with a electric furnace. They do not have central air conditioning, does have box air conditioner in the wall and has air purifier. The patient's bedroom is furnished with stuffed animals in bed, carpeting in bedroom and fabric window coverings. Pets inside the apartment includes 1 cat. There is history of cockroach or mice  "infestation. There are no smokers in the house.  The apartment does not have a basement.             Past Medical History   Past Medical History:   Diagnosis Date     Bipolar 1 disorder (H) 12/6/2012     Depressive disorder      Diabetes mellitus, type 2 (H) 12/13/2021     Paranoid type delusional disorder (H) 11/14/2012     Patient Active Problem List   Diagnosis     Animal dander allergy     CARDIOVASCULAR SCREENING; LDL GOAL LESS THAN 160     Psychosis (H)     Paranoid type delusional disorder (H)     Bipolar 1 disorder (H)     Insomnia     Depression with anxiety     Seasonal allergic rhinitis due to pollen     Allergic rhinitis due to mold     Allergic rhinitis due to animal dander     Allergic rhinitis due to dust mite     Encounter for IUD insertion     Schizoaffective disorder, bipolar type (H)     Gastroesophageal reflux disease without esophagitis     Paranoia (psychosis) (H)     Morbid obesity (H)     Schizoaffective disorder (H)     Umbilical hernia without obstruction and without gangrene     Fatty liver     Type 2 diabetes mellitus without complication, without long-term current use of insulin (H)     Elevated LFTs     Disorganized behavior     Infection due to 2019 novel coronavirus     Polypharmacy     Sedated due to multiple medications     Overdose, undetermined intent, initial encounter     Segmental and somatic dysfunction     SI (sacroiliac) joint dysfunction     mirena IUD 9/30/22     Neck pain     Hip pain, right     Chronic pain of both knees     Difficulty communicating     Difficulty using pragmatics in communication     Acquired hypothyroidism       Past Surgical History  She has a past surgical history that includes tonsillectomy & adenoidectomy; tonsillectomy & adenoidectomy; and Herniorrhaphy, Umbilical, Robot-Assisted, Laparoscopic, Using Da Maeve Xi (N/A, 9/26/2024).     Examination   Ht 1.6 m (5' 3\")   Wt 103 kg (227 lb)   BMI 40.21 kg/m    Wt Readings from Last 4 Encounters: "   10/09/24 103 kg (227 lb)   09/24/24 104.7 kg (230 lb 14.4 oz)   08/28/24 104.2 kg (229 lb 11.2 oz)   08/13/24 101.3 kg (223 lb 6.4 oz)      BP Readings from Last 3 Encounters:   09/26/24 125/87   09/24/24 120/78   08/28/24 110/72      GENERAL: alert and no distress  EYES: Eyes grossly normal to inspection.  No discharge or erythema, or obvious scleral/conjunctival abnormalities.  RESP: No audible wheeze, cough, or visible cyanosis.    SKIN: Visible skin clear. No significant rash, abnormal pigmentation or lesions.  NEURO: Cranial nerves grossly intact.  Mentation and speech appropriate for age.  PSYCH: Appropriate affect, tone, and pace of words        Counseling:   We reviewed the important post op bariatric recommendations:  -eating 3 meals daily  -eating protein first, getting >60gm protein daily  -eating slowly, chewing food well  -avoiding/limiting calorie containing beverages  -limiting starchy vegetables and carbohydrates, choosing wheat, not white with breads,   crackers, pastas, marquita, bagels, tortillas, rice  -limiting restaurant or cafeteria eating to twice a week or less    We discussed the importance of restorative sleep and stress management in maintaining a healthy weight.  We discussed the National Weight Control Registry healthy weight maintenance strategies and ways to optimize metabolism.  We discussed the importance of physical activity including cardiovascular and strength training in maintaining a healthier weight.    Total time spent on the date of this encounter doing: chart review, review of test results, patient visit, physical exam, education, counseling, developing plan of care and documenting = 49 minutes.         LONDON Serna MD  [facility]           Virtual Visit Details    Type of service:  Video Visit   Video Start Time: 1:43 PM  Video End Time:2:06 PM    Originating Location (pt. Location): Home    Distant Location (provider location):   Off-site  Platform used for Video Visit: Perri      Again, thank you for allowing me to participate in the care of your patient.        Sincerely,        LONDON Serna MD

## 2024-10-09 NOTE — Clinical Note
Zoltan Watson, I am helping Rachel with her weight loss efforts. I know you are prescribing liraglutide for her diabetes. Would you be comfortable raising the dose to 1.8 mg. Maddy states that she is still struggling with hunger. I would be happy to prescribe it also if you are comfortable with it. Fidelia Serna

## 2024-10-11 ENCOUNTER — OFFICE VISIT (OUTPATIENT)
Dept: SURGERY | Facility: CLINIC | Age: 39
End: 2024-10-11
Payer: COMMERCIAL

## 2024-10-11 VITALS
OXYGEN SATURATION: 94 % | TEMPERATURE: 97.3 F | SYSTOLIC BLOOD PRESSURE: 109 MMHG | HEART RATE: 89 BPM | DIASTOLIC BLOOD PRESSURE: 79 MMHG | RESPIRATION RATE: 16 BRPM

## 2024-10-11 DIAGNOSIS — F25.0 SCHIZOAFFECTIVE DISORDER, BIPOLAR TYPE (H): ICD-10-CM

## 2024-10-11 DIAGNOSIS — K42.9 UMBILICAL HERNIA WITHOUT OBSTRUCTION AND WITHOUT GANGRENE: Primary | ICD-10-CM

## 2024-10-11 PROCEDURE — 99212 OFFICE O/P EST SF 10 MIN: CPT | Performed by: PHYSICIAN ASSISTANT

## 2024-10-11 RX ORDER — FEXOFENADINE HCL 180 MG/1
180 TABLET ORAL DAILY
COMMUNITY

## 2024-10-11 RX ORDER — MONTELUKAST SODIUM 10 MG/1
10 TABLET ORAL AT BEDTIME
COMMUNITY

## 2024-10-11 ASSESSMENT — PAIN SCALES - GENERAL: PAINLEVEL: NO PAIN (0)

## 2024-10-11 NOTE — LETTER
10/11/2024      Maddy Ortiz  [address]  [facility] [address]      Dear Colleague,    Thank you for referring your patient, Maddy Ortiz, to the [facility]. Please see a copy of my visit note below.    Surgery Post-op Note  [facility] General Surgery  [address]  [place] [address]  T: [phone]  F: [phone]    Subjective:     Maddy Ortiz presents to the clinic after undergoing robotic umbilical hernia repair on 9/26/2024 with Dr. Mathias.  Patient is here for follow up. The patient reports no incisional pain.  Patient is currently tolerating regular diet.  Patient states bowel habits  are normal and  soft. Patient denies significant nausea or vomiting. She is doing well. She is here alone. She is very much wanting to return to bicycle riding. She states she is okay and has no issues. She only has questions about riding her bicycle.         Objective:     Vitals:   /79   Pulse 89   Temp 97.3  F (36.3  C) (Tympanic)   Resp 16   SpO2 94%    General Appearance:    Maddy is a well-appearing 38 year  female who is in no acute distress.   Cardiac:    Skin is warm and dry    Pulmonary:   Breathing is nonlabored    Abdomen:    Soft, nontender, nondistended. Incisions are intact.    Incision: The incision site is healing  well. Surgical dressing still in place, removed without issue. There are no signs of cellulitis or drainage.        Labs/Imaging:     none       Pathology:     none     Assessment:   Ms.Maria MARYSOL Ortiz is status post robotic incision hernia repair, doing well .        Plan:     1. The patient is instructed to call the office if there is any increasing incisional pain, swelling, redness, drainage, or any other problems.   2. Follow up in clinic if any questions or concerns.       Again, thank you for allowing me to participate in the care of your patient.        Sincerely,        ILENE KWAN PA-C

## 2024-10-22 ENCOUNTER — TRANSFERRED RECORDS (OUTPATIENT)
Dept: HEALTH INFORMATION MANAGEMENT | Facility: CLINIC | Age: 39
End: 2024-10-22
Payer: COMMERCIAL

## 2024-10-22 LAB — PHQ9 SCORE: 0

## 2024-10-31 DIAGNOSIS — E11.9 TYPE 2 DIABETES MELLITUS WITHOUT COMPLICATION, WITHOUT LONG-TERM CURRENT USE OF INSULIN (H): ICD-10-CM

## 2024-10-31 RX ORDER — LIRAGLUTIDE 6 MG/ML
1.2 INJECTION SUBCUTANEOUS DAILY
Qty: 18 ML | Refills: 3 | OUTPATIENT
Start: 2024-10-31

## 2024-10-31 NOTE — TELEPHONE ENCOUNTER
Medication Question or Refill    Contacts       Contact Date/Time Type Contact Phone/Fax    10/31/2024 09:22 AM CDT Phone (Incoming) Angel Rachel E (Self) [phone] (M)            What medication are you calling about (include dose and sig)?: Pending Prescriptions:                       Disp   Refills    liraglutide (VICTOZA) 18 MG/3ML solution  18 mL  3            Sig: Inject 1.2 mg subcutaneously daily.        Preferred Pharmacy:     [facility] [address] [place] [address] [place] [address] [address] [address]  Phone: [phone] Fax: [phone]      Controlled Substance Agreement on file:   CSA -- Patient Level:    CSA: None found at the patient level.       Who prescribed the medication?: Brunfelt    Do you need a refill? Yes    Patient offered an appointment? No    Do you have any questions or concerns?  Yes: pt states she has no fills left of medication and requesting refill. Please advise.      Could we send this information to you in [facility] or would you prefer to receive a phone call?:   Patient would prefer a phone call   Okay to leave a detailed message?: Yes at Home number on file [phone] (home)     Detail Level: Detailed

## 2024-11-05 ENCOUNTER — TRANSFERRED RECORDS (OUTPATIENT)
Dept: HEALTH INFORMATION MANAGEMENT | Facility: CLINIC | Age: 39
End: 2024-11-05
Payer: COMMERCIAL

## 2024-11-05 NOTE — PROGRESS NOTES
Surgery Post-op Note  [facility] General Surgery  [address]  [place] [address]  T: [phone]  F: [phone]    Subjective:     Maddy Ortiz presents to the clinic after undergoing robotic umbilical hernia repair on 9/26/2024 with Dr. Mathias.  Patient is here for follow up. The patient reports no incisional pain.  Patient is currently tolerating regular diet.  Patient states bowel habits  are normal and  soft. Patient denies significant nausea or vomiting. She is doing well. She is here alone. She is very much wanting to return to bicycle riding. She states she is okay and has no issues. She only has questions about riding her bicycle.         Objective:     Vitals:   /79   Pulse 89   Temp 97.3  F (36.3  C) (Tympanic)   Resp 16   SpO2 94%    General Appearance:    Maddy is a well-appearing 38 year  female who is in no acute distress.   Cardiac:    Skin is warm and dry    Pulmonary:   Breathing is nonlabored    Abdomen:    Soft, nontender, nondistended. Incisions are intact.    Incision: The incision site is healing  well. Surgical dressing still in place, removed without issue. There are no signs of cellulitis or drainage.        Labs/Imaging:     none       Pathology:     none     Assessment:   Ms.Maria MARYSOL Ortiz is status post robotic incision hernia repair, doing well .        Plan:     1. The patient is instructed to call the office if there is any increasing incisional pain, swelling, redness, drainage, or any other problems.   2. Follow up in clinic if any questions or concerns.

## 2024-11-14 ENCOUNTER — TRANSFERRED RECORDS (OUTPATIENT)
Dept: HEALTH INFORMATION MANAGEMENT | Facility: CLINIC | Age: 39
End: 2024-11-14
Payer: COMMERCIAL

## 2024-11-15 ENCOUNTER — OFFICE VISIT (OUTPATIENT)
Dept: FAMILY MEDICINE | Facility: CLINIC | Age: 39
End: 2024-11-15
Payer: COMMERCIAL

## 2024-11-15 VITALS
BODY MASS INDEX: 40.27 KG/M2 | HEIGHT: 63 IN | TEMPERATURE: 97.6 F | OXYGEN SATURATION: 97 % | WEIGHT: 227.3 LBS | SYSTOLIC BLOOD PRESSURE: 110 MMHG | RESPIRATION RATE: 16 BRPM | HEART RATE: 103 BPM | DIASTOLIC BLOOD PRESSURE: 80 MMHG

## 2024-11-15 DIAGNOSIS — E11.9 TYPE 2 DIABETES MELLITUS WITHOUT COMPLICATION, WITHOUT LONG-TERM CURRENT USE OF INSULIN (H): Primary | ICD-10-CM

## 2024-11-15 PROCEDURE — 99213 OFFICE O/P EST LOW 20 MIN: CPT | Performed by: NURSE PRACTITIONER

## 2024-11-15 ASSESSMENT — ANXIETY QUESTIONNAIRES
3. WORRYING TOO MUCH ABOUT DIFFERENT THINGS: NEARLY EVERY DAY
IF YOU CHECKED OFF ANY PROBLEMS ON THIS QUESTIONNAIRE, HOW DIFFICULT HAVE THESE PROBLEMS MADE IT FOR YOU TO DO YOUR WORK, TAKE CARE OF THINGS AT HOME, OR GET ALONG WITH OTHER PEOPLE: EXTREMELY DIFFICULT
8. IF YOU CHECKED OFF ANY PROBLEMS, HOW DIFFICULT HAVE THESE MADE IT FOR YOU TO DO YOUR WORK, TAKE CARE OF THINGS AT HOME, OR GET ALONG WITH OTHER PEOPLE?: EXTREMELY DIFFICULT
7. FEELING AFRAID AS IF SOMETHING AWFUL MIGHT HAPPEN: MORE THAN HALF THE DAYS
6. BECOMING EASILY ANNOYED OR IRRITABLE: NEARLY EVERY DAY
GAD7 TOTAL SCORE: 11
2. NOT BEING ABLE TO STOP OR CONTROL WORRYING: NOT AT ALL
1. FEELING NERVOUS, ANXIOUS, OR ON EDGE: NOT AT ALL
GAD7 TOTAL SCORE: 11
4. TROUBLE RELAXING: NEARLY EVERY DAY
7. FEELING AFRAID AS IF SOMETHING AWFUL MIGHT HAPPEN: MORE THAN HALF THE DAYS
5. BEING SO RESTLESS THAT IT IS HARD TO SIT STILL: NOT AT ALL
GAD7 TOTAL SCORE: 11

## 2024-11-15 ASSESSMENT — ENCOUNTER SYMPTOMS: BACK PAIN: 1

## 2024-11-15 NOTE — PROGRESS NOTES
Chief concern (CC): Diabetic foot exam         History of Present Illness (HPI):     Patient is a pleasant 38 year old female with an extensive medical history significant for type 2 diabetes mellitus, acquired hypothyroidism, chronic back pain, chronic insomnia, schizoaffective disorder, bipolar disorder, and depression with anxiety.      Patient presents today for an evaluation of her bilateral lower extremity for diabetic shoes. She would also like to discuss her insomnia.    BLE pain  Chronic bilateral leg and foot pain.  Improves after Victoza administration.    Chronic Insomnia  Getting worse.  Was taking Melatonin 10 mg but felt it was too sedating.  Currently not taking anything.  Wants a different medication that would be covered by her insurance.  Reported sleep 12 hours a night these days due to missing her lithium dose. Stated she missed taking her Lithium for the last 5 days because she forgot to call and order it via mail. She has called it in and should be getting it soon.        Patient Active Problem List   Diagnosis    Animal dander allergy    CARDIOVASCULAR SCREENING; LDL GOAL LESS THAN 160    Psychosis (H)    Paranoid type delusional disorder (H)    Bipolar 1 disorder (H)    Insomnia    Depression with anxiety    Seasonal allergic rhinitis due to pollen    Allergic rhinitis due to mold    Allergic rhinitis due to animal dander    Allergic rhinitis due to dust mite    Encounter for IUD insertion    Schizoaffective disorder, bipolar type (H)    Gastroesophageal reflux disease without esophagitis    Paranoia (psychosis) (H)    Morbid obesity (H)    Schizoaffective disorder (H)    Umbilical hernia without obstruction and without gangrene    Fatty liver    Type 2 diabetes mellitus without complication, without long-term current use of insulin (H)    Elevated LFTs    Disorganized behavior    Infection due to 2019 novel coronavirus    Polypharmacy    Sedated due to multiple medications    Overdose,  undetermined intent, initial encounter    Segmental and somatic dysfunction    SI (sacroiliac) joint dysfunction    mirena IUD 9/30/22    Neck pain    Hip pain, right    Chronic pain of both knees    Difficulty communicating    Difficulty using pragmatics in communication    Acquired hypothyroidism        Past Medical History (PMH)  Past Medical History:   Diagnosis Date    Bipolar 1 disorder (H) 12/6/2012    Depressive disorder     Diabetes mellitus, type 2 (H) 12/13/2021    Paranoid type delusional disorder (H) 11/14/2012       Accident/Injuries/Surgeries/Hospitalizations:   Past Surgical History:   Procedure Laterality Date    HERNIORRHAPHY, UMBILICAL, ROBOT-ASSISTED, LAPAROSCOPIC, USING DA MERCEDES XI N/A 9/26/2024    Procedure: HERNIORRHAPHY, UMBILICAL, ROBOT-ASSISTED, LAPAROSCOPIC, USING DA MERCEDES XI;  Surgeon: Shannon Mathias MD;  Location: [facility] OR    TONSILLECTOMY & ADENOIDECTOMY      as a child    TONSILLECTOMY & ADENOIDECTOMY          Allergies & reactions:     Allergies   Allergen Reactions    Dust Mites Shortness Of Breath    Animal Dander      Other reaction(s): *Unknown    Mold      Other reaction(s): Runny Nose    Trees        Medications:   Current Outpatient Medications   Medication Sig Dispense Refill    acetaminophen (TYLENOL) 325 MG tablet Take 2 tablets (650 mg) by mouth every 4 hours as needed for mild pain (to moderate pain)      albuterol (PROAIR HFA/PROVENTIL HFA/VENTOLIN HFA) 108 (90 Base) MCG/ACT inhaler Inhale 2 puffs into the lungs every 4 hours as needed for wheezing or shortness of breath 18 g 1    ammonium lactate (LAC-HYDRIN) 12 % external cream Apply topically 2 times daily as needed for dry skin 385 g 3    azelastine (ASTELIN) 0.1 % nasal spray Spray 2 sprays into both nostrils 2 times daily as needed for rhinitis 90 mL 1    azelastine (OPTIVAR) 0.05 % ophthalmic solution Apply 1 drop to eye 2 times daily as needed (itchy/watery/red eyes) 6 mL 11    fexofenadine (ALLEGRA) 180 MG tablet  Take 180 mg by mouth daily.      fluticasone (FLONASE) 50 MCG/ACT nasal spray Spray 2 sprays into both nostrils daily 15.8 mL 11    ibuprofen (ADVIL/MOTRIN) 200 MG tablet Take 200 mg by mouth every 4 hours as needed for pain      lamoTRIgine (LAMICTAL) 200 MG tablet Take 200 mg by mouth at bedtime. Also 25mg in the AM and 25mg at [place] time.      levocetirizine (XYZAL) 5 MG tablet Take 1 tablet (5 mg) by mouth every evening 90 tablet 1    levonorgestrel (MIRENA) 20 MCG/DAY IUD 1 each (20 mcg) by Intrauterine route once      levothyroxine (SYNTHROID/LEVOTHROID) 50 MCG tablet Take 1 tablet (50 mcg) by mouth daily. 90 tablet 3    liraglutide (VICTOZA) 18 MG/3ML solution Inject 1.8 mg subcutaneously daily. 9 mL 1    lithium ER (LITHOBID) 300 MG CR tablet Take 300 mg by mouth daily 900 mg daily      montelukast (SINGULAIR) 10 MG tablet Take 10 mg by mouth at bedtime.      naltrexone (DEPADE/REVIA) 50 MG tablet Take 0.5 tablets by mouth daily at 2 pm      OneTouch Delica Lancets 33G MISC 1 each 2 times daily 100 each 11    risperiDONE (RISPERDAL) 1 MG tablet Take 1 mg by mouth at bedtime      risperiDONE microspheres ER (RISPERDAL CONSTA) 50 MG injection Next due 6/30      spacer (OPTICHAMBER APOLINAR) holding chamber Use with Symbicort and albuterol inhalers as prescribed 1 each 0    venlafaxine (EFFEXOR XR) 75 MG 24 hr capsule daily. Taking 225 mg daily      ACCU-CHEK GUIDE test strip USE TO TEST BLOOD SUGAR 6 TIMES DAILY OR AS DIRECTED (Patient not taking: Reported on 11/15/2024) 600 strip 3    blood glucose (ONETOUCH VERIO IQ) test strip Use to test blood sugar 2 times daily or as directed. (Patient not taking: Reported on 11/15/2024) 100 strip 11    Blood Glucose Monitoring Suppl (ONETOUCH VERIO FLEX SYSTEM) w/Device KIT 1 each as needed (Patient not taking: Reported on 11/15/2024) 1 kit 0    docusate sodium (COLACE) 100 MG capsule Take 1 capsule (100 mg) by mouth 2 times daily as needed for constipation. (Patient  "not taking: Reported on 11/15/2024) 24 capsule 0    insulin pen needle (BD PEN NEEDLE ROZ 2ND GEN) 32G X 4 MM miscellaneous USE 1 PEN NEEDLES DAILY WITH VICTOZA (Patient not taking: Reported on 11/15/2024) 100 each 3    liraglutide (VICTOZA) 18 MG/3ML solution Inject 1.2 mg subcutaneously daily. 18 mL 3    Melatonin 10 MG TABS tablet Take 10 mg by mouth at bedtime (Patient not taking: Reported on 11/15/2024)      oxyCODONE (ROXICODONE) 5 MG tablet Take 1 tablet (5 mg) by mouth every 4 hours as needed for severe pain. (Patient not taking: Reported on 11/15/2024) 12 tablet 0     No current facility-administered medications for this visit.           Nutrition: 1-2 servings of fruits and vegetables.    Physical activity: \"I should exercise more\".    Stress Level: Mild stress    Alcohol, drugs, smoking: No    Sleep: 12 hours (haven't been taking medications regularly).    Periods: On IUD. Does not get a period. Not currently sexually active.     Review of Systems (ROS):    Constitutional: Positive for fatigue. Negative for unintentional weight loss, fever, chills, or night-sweats.  HEENT: Negative for vision change, headache, congestion, sore throat or otalgia.  CV: Negative for chest pain, palpitations or edema.  Resp: Negative for shortness of breath, wheezing, or cough.  GI: Negative for nausea, vomiting, constipation, diarrhea or abdominal pain.  : Negative for urinary frequency, urgency, hesitancy, or hematuria.  MSK: Positive for back pain. Negative for muscle weakness, movement difficulties, or joint stiffness.  Skin, hair nails: Denies any rash, wounds, lesions, flaking or tenderness.  Neuro: Negative for weakness, confusion, numbness or dizziness.  Psych: Denies any depressive or suicidal thoughts.        Objective    Vitals /80   Pulse 103   Temp 97.6  F (36.4  C) (Tympanic)   Resp 16   Ht 1.6 m (5' 3\")   Wt 103.1 kg (227 lb 4.8 oz)   SpO2 97%   BMI 40.26 kg/m        General: Alert, calm, " appears mildly fatigued.    Musculoskeletal system:  Bilateral lower extremity symmetric, muscle strength appropriate and equal +5/5 bilaterally. No edema, localized erythema or ecchymosis noted. Full range of active and passive motion in all joints.       Assessment & Plan      #Diabetic foot evaluation  Diabetic foot exam negative for neuropathy.  DP pulses +2, Cap refill <3 seconds in both feet.  BLE skin is warm, dry and intact. No sores, rash, lesions, or open wounds present. No edema.    Based on the above assessment, patient is not eligible for special shoes at this time.  Continue current treatment plan and medication regimen.  Follow-up if symptoms worsen or new onset of symptoms.      #Chronic Insomnia  Patient is on multiple medications that are contraindicated for adding a sleep medication as this time.  Educated patient on good sleep hygiene, avoid electronic devices at bedtime.  Take medications on time.            Signed: EMELY Gilliam student

## 2024-11-15 NOTE — PROGRESS NOTES
Assessment & Plan     Type 2 diabetes mellitus without complication, without long-term current use of insulin (H)  Patient is here today for diabetic foot exam.  She was instructed to make this appointment as I had received a request for diabetic shoes.  Patient states that she did not request the shoes and does not feel she needs them.  Her foot exam was normal, no callus, no neuropathy.  At this time she does not meet criteria for diabetic shoes.  Patient should follow-up as needed.      The risks, benefits and treatment options of prescribed medications or other treatments have been discussed with the patient. The patient verbalized their understanding and should call or follow up if no improvement or if they develop further problems.  Shirley Freeman, OLGA                  Subjective   Rachel is a 38 year old, presenting for the following health issues:  Back Pain, Diabetes, Depression, and Sleep Problem (Would like to get on a medication to help her sleep better)        11/15/2024    10:58 AM   Additional Questions   Roomed by diomedes   Accompanied by self         11/15/2024    10:58 AM   Patient Reported Additional Medications   Patient reports taking the following new medications none     HPI       Diabetes Follow-up    How often are you checking your blood sugar? One time daily  What time of day are you checking your blood sugars (select all that apply)?  Before meals  Have you had any blood sugars above 200?  No  Have you had any blood sugars below 70?  No  What symptoms do you notice when your blood sugar is low?  None  What concerns do you have today about your diabetes? None   Do you have any of these symptoms? (Select all that apply)  No numbness or tingling in feet.  No redness, sores or blisters on feet.  No complaints of excessive thirst.  No reports of blurry vision.  No significant changes to weight.      BP Readings from Last 2 Encounters:   11/15/24 110/80   10/11/24 109/79     Hemoglobin A1C  "(%)   Date Value   09/24/2024 5.7 (H)   06/28/2024 5.1   06/09/2013 4.7     LDL Cholesterol Calculated (mg/dL)   Date Value   12/06/2023 121 (H)   05/08/2023 137 (H)                 Review of Systems  Constitutional, HEENT, cardiovascular, pulmonary, gi and gu systems are negative, except as otherwise noted.      Objective    /80   Pulse 103   Temp 97.6  F (36.4  C) (Tympanic)   Resp 16   Ht 1.6 m (5' 3\")   Wt 103.1 kg (227 lb 4.8 oz)   SpO2 97%   BMI 40.26 kg/m    Body mass index is 40.26 kg/m .  Physical Exam   GENERAL: alert and no distress  Diabetic foot exam: normal DP and PT pulses, no trophic changes or ulcerative lesions, normal sensory exam, and normal monofilament exam            Signed Electronically by: PHILL Luo CNP    "

## 2024-11-18 ENCOUNTER — TRANSFERRED RECORDS (OUTPATIENT)
Dept: HEALTH INFORMATION MANAGEMENT | Facility: CLINIC | Age: 39
End: 2024-11-18
Payer: COMMERCIAL

## 2024-11-18 LAB — PHQ9 SCORE: 2

## 2024-11-26 ENCOUNTER — NURSE TRIAGE (OUTPATIENT)
Dept: NURSING | Facility: CLINIC | Age: 39
End: 2024-11-26
Payer: COMMERCIAL
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
## 2024-12-03 ENCOUNTER — VIRTUAL VISIT (OUTPATIENT)
Dept: SURGERY | Facility: CLINIC | Age: 39
End: 2024-12-03
Payer: COMMERCIAL

## 2024-12-03 DIAGNOSIS — E66.01 MORBID OBESITY WITH BMI OF 40.0-44.9, ADULT (H): Primary | ICD-10-CM

## 2024-12-03 DIAGNOSIS — E11.9 TYPE 2 DIABETES MELLITUS WITHOUT COMPLICATION, WITHOUT LONG-TERM CURRENT USE OF INSULIN (H): ICD-10-CM

## 2024-12-03 PROCEDURE — 97803 MED NUTRITION INDIV SUBSEQ: CPT | Mod: 95 | Performed by: DIETITIAN, REGISTERED

## 2024-12-03 NOTE — LETTER
12/3/2024      Maddy Ortiz  [address]  [place] [address]      Dear Colleague,    Thank you for referring your patient, Maddy Ortiz, to the [facility] [place]. Please see a copy of my visit note below.    Maddy Ortiz is a 38 year old who is being evaluated via a billable video visit.      How would you like to obtain your AVS? MyChart  If the video visit is dropped, the invitation should be resent by: Text to cell phone: [phone]  Will anyone else be joining your video visit? No        Medical  Weight Loss Follow-Up Diet Evaluation  Assessment:  Maddy is presenting today for a follow up weight management nutrition consultation.  This patient has had an initial appointment and was referred by Dr. Serna for MNT as treatment for Morbid obesity     Weight loss medication: Victoza.     Pt's weight is 227.3 lbs  Initial weight: 202 lbs  Weight change: +25.3 lbs        10/9/2024     1:34 PM   Changes and Difficulties   I have made the following changes to my diet since my last visit: eat more veggies,   With regards to my diet, I am still struggling with: no   I have made the following changes to my activity/exercise since my last visit: taking it easy, more bike when doc says ok   With regards to my activity/exercise, I am still struggling with: recent surgery     BMI: 40.26  Ideal body weight: 52.4 kg (115 lb 8.3 oz)  Adjusted ideal body weight: 72.7 kg (160 lb 3.7 oz)    Estimated RMR (Gates-St Jeor equation):   1,727 kcals x 1.2 (sedentary) = 2,072 kcals (for weight maintenance)  Recommended Protein Intake: 60-80 grams of protein/day    Patient Active Problem List:  Patient Active Problem List   Diagnosis     Animal dander allergy     CARDIOVASCULAR SCREENING; LDL GOAL LESS THAN 160     Psychosis (H)     Paranoid type delusional disorder (H)     Bipolar 1 disorder (H)     Insomnia     Depression with anxiety     Seasonal allergic rhinitis  due to pollen     Allergic rhinitis due to mold     Allergic rhinitis due to animal dander     Allergic rhinitis due to dust mite     Encounter for IUD insertion     Schizoaffective disorder, bipolar type (H)     Gastroesophageal reflux disease without esophagitis     Paranoia (psychosis) (H)     Morbid obesity (H)     Schizoaffective disorder (H)     Umbilical hernia without obstruction and without gangrene     Fatty liver     Type 2 diabetes mellitus without complication, without long-term current use of insulin (H)     Elevated LFTs     Disorganized behavior     Infection due to 2019 novel coronavirus     Polypharmacy     Sedated due to multiple medications     Overdose, undetermined intent, initial encounter     Segmental and somatic dysfunction     SI (sacroiliac) joint dysfunction     mirena IUD 9/30/22     Neck pain     Hip pain, right     Chronic pain of both knees     Difficulty communicating     Difficulty using pragmatics in communication     Acquired hypothyroidism   Diabetes: T2DM, A1C of 5.1% on 6/28/2024     Progress on goals from last visit: Patient has been sticking to a regular diet and using Mom's Meals - has one meal per day. Patient has been mindful of her portion sizes. Patient has been having taste changes with medication.   Goals:  Eat breakfast daily/3 meals per day  Continue to aim to have a fruit/veggie daily. Incorporate fresh, frozen, canned fruits and veggies with meals   Stay consistent with activity - walking and biking    Dietary Recall:  Breakfast: skips sometimes OR eggs  Lunch:Mom's Meal  Dinner:protein, vegetable and starch/grain  Typical snacks: ice cream    Beverages:   Water - 3 cups (tall glasses) of water/day  Pop (zero sugar)  Coffee with sweetener - 1 cup/day    Exercise:   Patient has been walking 6-7 days/week - harder to get out with the cold weather - will walk to the bank, store, to run errands    Nutrition Diagnosis:    Obesity related to overeating and poor  lifestyle habits as evidenced by patient's report of limited budget, inconsistent meals, large portions, frequent snacking of sweets, lack of activity and BMI 40.26    Intervention:  Food and/or nutrient delivery: stay consistent with three meals. Try adding in a fruit and/or vegetable each day. Stay consistent with exercise routine.   Nutrition education: hunger solutions resource    Monitoring/Evaluation:    Goals:  Eat breakfast daily/3 meals per day  Continue to aim to have a fruit/veggie daily. Incorporate fresh, frozen, canned fruits and veggies with meals   Stay consistent with activity - walking and biking    Patient to follow up in 5 month(s) with bariatrician and 3 month(s) with RD    Video-Visit Details    Type of service:  Video Visit    Video Start Time (time video started): 1:59 PM    Video End Time (time video stopped): 2:11 PM    Originating Location (pt. Location): Home      Distant Location (provider location):  Off-site    Mode of Communication:  Video Conference via [facility]    Physician has received verbal consent for a Video Visit from the patient? Yes      Karly Baca RD      Again, thank you for allowing me to participate in the care of your patient.        Sincerely,        Karly Baca RD

## 2024-12-03 NOTE — PROGRESS NOTES
Maddy Ortiz is a 38 year old who is being evaluated via a billable video visit.      How would you like to obtain your AVS? MyChart  If the video visit is dropped, the invitation should be resent by: Text to cell phone: [phone]  Will anyone else be joining your video visit? No        Medical  Weight Loss Follow-Up Diet Evaluation  Assessment:  Maddy is presenting today for a follow up weight management nutrition consultation.  This patient has had an initial appointment and was referred by Dr. Serna for MNT as treatment for Morbid obesity     Weight loss medication: Victoza.     Pt's weight is 227.3 lbs  Initial weight: 202 lbs  Weight change: +25.3 lbs        10/9/2024     1:34 PM   Changes and Difficulties   I have made the following changes to my diet since my last visit: eat more veggies,   With regards to my diet, I am still struggling with: no   I have made the following changes to my activity/exercise since my last visit: taking it easy, more bike when doc says ok   With regards to my activity/exercise, I am still struggling with: recent surgery     BMI: 40.26  Ideal body weight: 52.4 kg (115 lb 8.3 oz)  Adjusted ideal body weight: 72.7 kg (160 lb 3.7 oz)    Estimated RMR ([place]-St Jeor equation):   1,727 kcals x 1.2 (sedentary) = 2,072 kcals (for weight maintenance)  Recommended Protein Intake: 60-80 grams of protein/day    Patient Active Problem List:  Patient Active Problem List   Diagnosis    Animal dander allergy    CARDIOVASCULAR SCREENING; LDL GOAL LESS THAN 160    Psychosis (H)    Paranoid type delusional disorder (H)    Bipolar 1 disorder (H)    Insomnia    Depression with anxiety    Seasonal allergic rhinitis due to pollen    Allergic rhinitis due to mold    Allergic rhinitis due to animal dander    Allergic rhinitis due to dust mite    Encounter for IUD insertion    Schizoaffective disorder, bipolar type (H)    Gastroesophageal reflux disease without esophagitis    Paranoia (psychosis)  (H)    Morbid obesity (H)    Schizoaffective disorder (H)    Umbilical hernia without obstruction and without gangrene    Fatty liver    Type 2 diabetes mellitus without complication, without long-term current use of insulin (H)    Elevated LFTs    Disorganized behavior    Infection due to 2019 novel coronavirus    Polypharmacy    Sedated due to multiple medications    Overdose, undetermined intent, initial encounter    Segmental and somatic dysfunction    SI (sacroiliac) joint dysfunction    mirena IUD 9/30/22    Neck pain    Hip pain, right    Chronic pain of both knees    Difficulty communicating    Difficulty using pragmatics in communication    Acquired hypothyroidism   Diabetes: T2DM, A1C of 5.1% on 6/28/2024     Progress on goals from last visit: Patient has been sticking to a regular diet and using Mom's Meals - has one meal per day. Patient has been mindful of her portion sizes. Patient has been having taste changes with medication.   Goals:  Eat breakfast daily/3 meals per day  Continue to aim to have a fruit/veggie daily. Incorporate fresh, frozen, canned fruits and veggies with meals   Stay consistent with activity - walking and biking    Dietary Recall:  Breakfast: skips sometimes OR eggs  Lunch:Mom's Meal  Dinner:protein, vegetable and starch/grain  Typical snacks: ice cream    Beverages:   Water - 3 cups (tall glasses) of water/day  Pop (zero sugar)  Coffee with sweetener - 1 cup/day    Exercise:   Patient has been walking 6-7 days/week - harder to get out with the cold weather - will walk to the bank, store, to run errands    Nutrition Diagnosis:    Obesity related to overeating and poor lifestyle habits as evidenced by patient's report of limited budget, inconsistent meals, large portions, frequent snacking of sweets, lack of activity and BMI 40.26    Intervention:  Food and/or nutrient delivery: stay consistent with three meals. Try adding in a fruit and/or vegetable each day. Stay consistent with  exercise routine.   Nutrition education: hunger solutions resource    Monitoring/Evaluation:    Goals:  Eat breakfast daily/3 meals per day  Continue to aim to have a fruit/veggie daily. Incorporate fresh, frozen, canned fruits and veggies with meals   Stay consistent with activity - walking and biking    Patient to follow up in 5 month(s) with bariatrician and 3 month(s) with RD    Video-Visit Details    Type of service:  Video Visit    Video Start Time (time video started): 1:59 PM    Video End Time (time video stopped): 2:11 PM    Originating Location (pt. Location): Home      Distant Location (provider location):  Off-site    Mode of Communication:  Video Conference via [facility]    Physician has received verbal consent for a Video Visit from the patient? Yes      Karly Baca RD

## 2024-12-19 DIAGNOSIS — E11.9 TYPE 2 DIABETES MELLITUS WITHOUT COMPLICATION, WITHOUT LONG-TERM CURRENT USE OF INSULIN (H): ICD-10-CM

## 2024-12-19 RX ORDER — PEN NEEDLE, DIABETIC 32GX 5/32"
NEEDLE, DISPOSABLE MISCELLANEOUS
Qty: 100 EACH | Refills: 1 | Status: SHIPPED | OUTPATIENT
Start: 2024-12-19

## 2024-12-19 NOTE — TELEPHONE ENCOUNTER
Patient's call transferred to author  Patient needing insulin pen needles sent to her pharmacy    Refill sent to preferred pharmacy    Tim Godinez, Clinic RN  [facility]

## 2024-12-22 ENCOUNTER — HEALTH MAINTENANCE LETTER (OUTPATIENT)
Age: 39
End: 2024-12-22

## 2024-12-23 ENCOUNTER — TELEPHONE (OUTPATIENT)
Dept: FAMILY MEDICINE | Facility: CLINIC | Age: 39
End: 2024-12-23
Payer: COMMERCIAL

## 2024-12-23 ENCOUNTER — TRANSFERRED RECORDS (OUTPATIENT)
Dept: HEALTH INFORMATION MANAGEMENT | Facility: CLINIC | Age: 39
End: 2024-12-23
Payer: COMMERCIAL

## 2024-12-23 NOTE — TELEPHONE ENCOUNTER
Forms/Letter Request    Received call from [facility] stating they faxed us a Revision to Plan of Care on 12/12/24 and have not received it back. Unable to locate original. Reprinted from [employer] and placed on providers desk to sign.    Mar Omalley on 12/23/2024 at 3:24 PM

## 2024-12-29 ENCOUNTER — MYC MEDICAL ADVICE (OUTPATIENT)
Dept: FAMILY MEDICINE | Facility: CLINIC | Age: 39
End: 2024-12-29
Payer: COMMERCIAL

## 2024-12-30 ENCOUNTER — MYC MEDICAL ADVICE (OUTPATIENT)
Dept: FAMILY MEDICINE | Facility: CLINIC | Age: 39
End: 2024-12-30
Payer: COMMERCIAL

## 2025-01-13 ENCOUNTER — TELEPHONE (OUTPATIENT)
Dept: FAMILY MEDICINE | Facility: CLINIC | Age: 40
End: 2025-01-13
Payer: COMMERCIAL

## 2025-01-13 NOTE — TELEPHONE ENCOUNTER
Patient is due for a diabetes follow up appointment with me.      Fasting labs due:  Lipid panel and A1C    Orders were placed, please have lab work done before visit.      Please ask patient to bring in their glucometer so we can download the data.    Please call patient to schedule.  Shirley Freeman, CNP

## 2025-01-13 NOTE — TELEPHONE ENCOUNTER
Patient Quality Outreach    Patient is due for the following:   Diabetes -  A1C, lipids    Action(s) Taken:   Schedule a office visit for diabetes with lab prior     Type of outreach:    [employer] message. Will postpone a few days to view , if not will call .    Questions for provider review:    None           Eduardo Kingsley, CMA

## 2025-01-14 ENCOUNTER — TRANSFERRED RECORDS (OUTPATIENT)
Dept: HEALTH INFORMATION MANAGEMENT | Facility: CLINIC | Age: 40
End: 2025-01-14
Payer: COMMERCIAL

## 2025-01-20 NOTE — PROGRESS NOTES
Rachel Pierce has an upcoming lab appointment with us. Please review and place orders if needed.  Thank you,  [facility]

## 2025-01-22 ENCOUNTER — LAB (OUTPATIENT)
Dept: LAB | Facility: CLINIC | Age: 40
End: 2025-01-22
Payer: COMMERCIAL

## 2025-01-22 DIAGNOSIS — Z13.6 SCREENING FOR CARDIOVASCULAR CONDITION: Primary | ICD-10-CM

## 2025-01-22 DIAGNOSIS — E11.9 TYPE 2 DIABETES MELLITUS WITHOUT COMPLICATION, WITHOUT LONG-TERM CURRENT USE OF INSULIN (H): ICD-10-CM

## 2025-01-22 LAB
CHOLEST SERPL-MCNC: 229 MG/DL
FASTING STATUS PATIENT QL REPORTED: NO
HDLC SERPL-MCNC: 30 MG/DL
LDLC SERPL CALC-MCNC: ABNORMAL MG/DL
LDLC SERPL DIRECT ASSAY-MCNC: 116 MG/DL
NONHDLC SERPL-MCNC: 199 MG/DL
TRIGL SERPL-MCNC: 634 MG/DL

## 2025-01-22 PROCEDURE — 83721 ASSAY OF BLOOD LIPOPROTEIN: CPT | Mod: 59

## 2025-01-22 PROCEDURE — 36415 COLL VENOUS BLD VENIPUNCTURE: CPT

## 2025-01-22 PROCEDURE — 80061 LIPID PANEL: CPT

## 2025-01-23 ENCOUNTER — MYC MEDICAL ADVICE (OUTPATIENT)
Dept: FAMILY MEDICINE | Facility: CLINIC | Age: 40
End: 2025-01-23
Payer: COMMERCIAL

## 2025-01-24 PROBLEM — E78.5 HYPERLIPIDEMIA LDL GOAL <100: Status: ACTIVE | Noted: 2025-01-24

## 2025-01-24 PROBLEM — Z30.430 ENCOUNTER FOR IUD INSERTION: Status: RESOLVED | Noted: 2017-09-15 | Resolved: 2025-01-24

## 2025-01-24 NOTE — TELEPHONE ENCOUNTER
Shared pt MyChart with provider.  Pt was just seen this morning and pt at baseline.  Provider had no concerns.  Closing encounter    Stacey ALMAZAN RN  [facility]

## 2025-01-24 NOTE — TELEPHONE ENCOUNTER
Shared pt MyChart with provider.  Pt was just seen this morning and pt at baseline.  Provider had no concerns.  Closing encounter     Stacey ALMAZAN RN  [facility]

## 2025-01-27 ENCOUNTER — PATIENT OUTREACH (OUTPATIENT)
Dept: CARE COORDINATION | Facility: CLINIC | Age: 40
End: 2025-01-27
Payer: COMMERCIAL

## 2025-02-17 ENCOUNTER — TELEPHONE (OUTPATIENT)
Dept: FAMILY MEDICINE | Facility: CLINIC | Age: 40
End: 2025-02-17
Payer: COMMERCIAL

## 2025-02-17 DIAGNOSIS — F80.9 SPEECH AND LANGUAGE DEFICITS: Primary | ICD-10-CM

## 2025-02-17 NOTE — TELEPHONE ENCOUNTER
----- Message from Belen ANA sent at 2/17/2025 11:33 AM CST -----  Regarding: Speech order  Good morning,     We received an order for speech therapy for the above patient. For the therapy to be covered by the patient's insurance, the order needs to have a qualifying diagnosis.     Difficulty using verbal communication [R47.9] is not on the Qualifying Diagnosis list.   There are others that may fit with this diagnosis on the list.     If there is an appropriate qualifying diagnosis that the patient has, please add this to the speech therapy referral. We will then be able to schedule your patient for their speech therapy needs.     For future reference, the Speech Therapy Referral (procedure code 9048 in Epic) already has a feature built into the order for the qualifying diagnosis codes.     For your convenience, you may want to update your preference list to include this referral.      Thank you for your help!    Sincerely, the Rehab Central Scheduling Team

## 2025-02-18 ENCOUNTER — TELEPHONE (OUTPATIENT)
Dept: FAMILY MEDICINE | Facility: CLINIC | Age: 40
End: 2025-02-18
Payer: COMMERCIAL

## 2025-02-18 NOTE — TELEPHONE ENCOUNTER
Please call and let patient know that I have tried 2 different diagnoses codes for speech therapy for her concerns.  Both have been denied.  I recommend that she try psychology as a treatment for her difficulty with social situations.  If she does not already have a therapist, let me know and I will place the referral.  Shirley Freeman, CNP

## 2025-02-18 NOTE — TELEPHONE ENCOUNTER
----- Message from Sridevi VELASQUEZ sent at 2/18/2025 10:52 AM CST -----  Regarding: orders  Hello,  We received an order for speech therapy for the above patient. For the therapy to be covered by the patient's insurance, the order needs to have a qualifying diagnosis. If there is an appropriate qualifying diagnosis that the patient has, please add this to the speech therapy referral. We will then be able to schedule your patient for their speech therapy needs.   For future reference, the Speech Therapy Referral (procedure code 9048 in Epic) already has a feature built into the order for the qualifying diagnosis codes. For your convenience, you may want to update your preference list to include this referral.  Thank you for your help!  Sincerely, the Rehab Central Scheduling Team

## 2025-02-21 ENCOUNTER — MYC MEDICAL ADVICE (OUTPATIENT)
Dept: FAMILY MEDICINE | Facility: CLINIC | Age: 40
End: 2025-02-21
Payer: COMMERCIAL

## 2025-02-24 ENCOUNTER — TRANSFERRED RECORDS (OUTPATIENT)
Dept: HEALTH INFORMATION MANAGEMENT | Facility: CLINIC | Age: 40
End: 2025-02-24
Payer: COMMERCIAL

## 2025-02-24 LAB
PHQ9 SCORE: 2
PHQ9 SCORE: 2

## 2025-02-24 NOTE — PROGRESS NOTES
Maddy Ortiz is a 39 year old who is being evaluated via a billable video visit.      How would you like to obtain your AVS? MyChart  If the video visit is dropped, the invitation should be resent by: Text to cell phone: [phone]  Will anyone else be joining your video visit? No        Medical  Weight Loss Follow-Up Diet Evaluation  Assessment:  Maddy is presenting today for a follow up weight management nutrition consultation.  This patient has had an initial appointment and was referred by Dr. Serna for MNT as treatment for Morbid obesity   Weight loss medication: Victoza.   Pt's weight is 226 lbs (2/14/2025)  Initial weight: 202 lbs  Weight change: +24 lbs (down 1.3 lbs since December)        10/9/2024     1:34 PM   Changes and Difficulties   I have made the following changes to my diet since my last visit: eat more veggies,   With regards to my diet, I am still struggling with: no   I have made the following changes to my activity/exercise since my last visit: taking it easy, more bike when doc says ok   With regards to my activity/exercise, I am still struggling with: recent surgery     BMI: 40.03  Ideal body weight: 52.4 kg (115 lb 8.3 oz)  Adjusted ideal body weight: 72.4 kg (159 lb 11.4 oz)    Estimated RMR ([place]-St Jeor equation):   1,664 kcals x 1.2 (sedentary) = 1,997 kcals (for weight maintenance)  Recommended Protein Intake: 60-80 grams of protein/day  Patient Active Problem List:  Patient Active Problem List   Diagnosis    Animal dander allergy    CARDIOVASCULAR SCREENING; LDL GOAL LESS THAN 160    Psychosis (H)    Paranoid type delusional disorder (H)    Bipolar 1 disorder (H)    Insomnia    Depression with anxiety    Seasonal allergic rhinitis due to pollen    Allergic rhinitis due to mold    Allergic rhinitis due to animal dander    Allergic rhinitis due to dust mite    Schizoaffective disorder, bipolar type (H)    Gastroesophageal reflux disease without esophagitis    Paranoia (psychosis)  (H)    Morbid obesity (H)    Umbilical hernia without obstruction and without gangrene    Fatty liver    Type 2 diabetes mellitus without complication, without long-term current use of insulin (H)    Disorganized behavior    Infection due to 2019 novel coronavirus    Polypharmacy    Sedated due to multiple medications    Overdose, undetermined intent, initial encounter    Segmental and somatic dysfunction    SI (sacroiliac) joint dysfunction    mirena IUD 9/30/22    Neck pain    Hip pain, right    Chronic pain of both knees    Difficulty communicating    Difficulty using pragmatics in communication    Acquired hypothyroidism    Hyperlipidemia LDL goal <100   Diabetes: T2DM, A1C of 5.1% on 6/28/2024     Progress on goals from last visit: Patient stated that she has not been eating a lot of vegetables - typically only having one serving per day. Patient stated that she continues to struggle with financials for foods. Patient stated that she has a lot of Mom's Meals and will continue to order these - these will typically have a vegetable, meat and starch - potatoes, animal protein (nikita steak or chicken) with 1-2 servings of vegetables. Patient stated that she is planning on staying inside for her exercise and only going outside for a short period of time.   Goals:  Eat breakfast daily/3 meals per day  Continue to aim to have a fruit/veggie daily. Incorporate fresh, frozen, canned fruits and veggies with meals   Stay consistent with activity - walking and biking    Dietary Recall:*typically only 2 meals per day  Breakfast: eggs OR cereal OR oatmeal  Lunch: more of a snack - saltine/soda crackers  Dinner: frozen meal (lo mien)  Typical snacks: limited - apple, club crackers, fruit snacks  Eating out: limited  Beverages:   Water - 5-8 cups (tall glasses) of water/day  Pop (zero sugar)  Coffee with sweetener - 1 cup/day  Tea  Exercise:   Patient is planning on staying inside for exercise d/t her mental health at this  time - focusing on chores, cleaning  Patient will plan on taking short walks outside for fresh air and may do exercise videos at home  Nutrition Diagnosis:    Obesity related to overeating and poor lifestyle habits as evidenced by BMI 40.03    Intervention:  Food and/or nutrient delivery: stay consistent with three meals per day - aim to have breakfast consistently in the morning. Try having a servings or fruits or vegetables each day. Stay consistent with movement during the day.   Nutrition education: Workout options    Monitoring/Evaluation:    Goals:  Eat breakfast daily  Aim to have a serving of fruits or vegetables daily. Incorporate fresh, frozen, canned fruits and vegetables with meals   Stay consistent with activity    Patient to follow up in 2 month(s) with bariatrician and 4 month(s) with RD      Video-Visit Details    Type of service:  Video Visit    Video Start Time (time video started): 11:26 AM    Video End Time (time video stopped): 11:39 AM    Originating Location (pt. Location): Home      Distant Location (provider location):  Off-site    Mode of Communication:  Video Conference via [facility]    Physician has received verbal consent for a Video Visit from the patient? Yes      Karly Baca RD

## 2025-02-25 ENCOUNTER — TELEPHONE (OUTPATIENT)
Dept: PHARMACY | Facility: CLINIC | Age: 40
End: 2025-02-25
Payer: COMMERCIAL

## 2025-02-25 NOTE — TELEPHONE ENCOUNTER
Recruitment call for MTM and left message for patient.  MTM Recruitment: [facility] insurance     Referral outreach attempt #1 on February 25, 2025      Outcome: left voicemail- Call back number [phone] and MyChart message sent    Kirstin Walker [facility]  -MTM  [phone]

## 2025-03-03 ENCOUNTER — VIRTUAL VISIT (OUTPATIENT)
Dept: SURGERY | Facility: CLINIC | Age: 40
End: 2025-03-03
Payer: COMMERCIAL

## 2025-03-03 ENCOUNTER — TRANSFERRED RECORDS (OUTPATIENT)
Dept: HEALTH INFORMATION MANAGEMENT | Facility: CLINIC | Age: 40
End: 2025-03-03

## 2025-03-03 DIAGNOSIS — E11.9 TYPE 2 DIABETES MELLITUS WITHOUT COMPLICATION, WITHOUT LONG-TERM CURRENT USE OF INSULIN (H): Primary | ICD-10-CM

## 2025-03-03 DIAGNOSIS — E66.01 MORBID OBESITY WITH BMI OF 40.0-44.9, ADULT (H): ICD-10-CM

## 2025-03-03 DIAGNOSIS — Z71.3 NUTRITIONAL COUNSELING: ICD-10-CM

## 2025-03-03 LAB — PHQ9 SCORE: 2

## 2025-03-03 PROCEDURE — 97803 MED NUTRITION INDIV SUBSEQ: CPT | Mod: 95 | Performed by: DIETITIAN, REGISTERED

## 2025-03-03 NOTE — LETTER
3/3/2025      Maddy Ortiz  [address]  [place] [address]      Dear Colleague,    Thank you for referring your patient, Maddy Ortiz, to the [facility] [place]. Please see a copy of my visit note below.    Maddy Ortiz is a 39 year old who is being evaluated via a billable video visit.      How would you like to obtain your AVS? MyChart  If the video visit is dropped, the invitation should be resent by: Text to cell phone: [phone]  Will anyone else be joining your video visit? No        Medical  Weight Loss Follow-Up Diet Evaluation  Assessment:  Maddy is presenting today for a follow up weight management nutrition consultation.  This patient has had an initial appointment and was referred by Dr. Serna for MNT as treatment for Morbid obesity   Weight loss medication: Victoza.   Pt's weight is 226 lbs (2/14/2025)  Initial weight: 202 lbs  Weight change: +24 lbs (down 1.3 lbs since December)        10/9/2024     1:34 PM   Changes and Difficulties   I have made the following changes to my diet since my last visit: eat more veggies,   With regards to my diet, I am still struggling with: no   I have made the following changes to my activity/exercise since my last visit: taking it easy, more bike when doc says ok   With regards to my activity/exercise, I am still struggling with: recent surgery     BMI: 40.03  Ideal body weight: 52.4 kg (115 lb 8.3 oz)  Adjusted ideal body weight: 72.4 kg (159 lb 11.4 oz)    Estimated RMR ([place]-St Jeor equation):   1,664 kcals x 1.2 (sedentary) = 1,997 kcals (for weight maintenance)  Recommended Protein Intake: 60-80 grams of protein/day  Patient Active Problem List:  Patient Active Problem List   Diagnosis     Animal dander allergy     CARDIOVASCULAR SCREENING; LDL GOAL LESS THAN 160     Psychosis (H)     Paranoid type delusional disorder (H)     Bipolar 1 disorder (H)     Insomnia     Depression with anxiety      Seasonal allergic rhinitis due to pollen     Allergic rhinitis due to mold     Allergic rhinitis due to animal dander     Allergic rhinitis due to dust mite     Schizoaffective disorder, bipolar type (H)     Gastroesophageal reflux disease without esophagitis     Paranoia (psychosis) (H)     Morbid obesity (H)     Umbilical hernia without obstruction and without gangrene     Fatty liver     Type 2 diabetes mellitus without complication, without long-term current use of insulin (H)     Disorganized behavior     Infection due to 2019 novel coronavirus     Polypharmacy     Sedated due to multiple medications     Overdose, undetermined intent, initial encounter     Segmental and somatic dysfunction     SI (sacroiliac) joint dysfunction     mirena IUD 9/30/22     Neck pain     Hip pain, right     Chronic pain of both knees     Difficulty communicating     Difficulty using pragmatics in communication     Acquired hypothyroidism     Hyperlipidemia LDL goal <100   Diabetes: T2DM, A1C of 5.1% on 6/28/2024     Progress on goals from last visit: Patient stated that she has not been eating a lot of vegetables - typically only having one serving per day. Patient stated that she continues to struggle with financials for foods. Patient stated that she has a lot of Mom's Meals and will continue to order these - these will typically have a vegetable, meat and starch - potatoes, animal protein (nikita steak or chicken) with 1-2 servings of vegetables. Patient stated that she is planning on staying inside for her exercise and only going outside for a short period of time.   Goals:  Eat breakfast daily/3 meals per day  Continue to aim to have a fruit/veggie daily. Incorporate fresh, frozen, canned fruits and veggies with meals   Stay consistent with activity - walking and biking    Dietary Recall:*typically only 2 meals per day  Breakfast: eggs OR cereal OR oatmeal  Lunch: more of a snack - saltine/soda crackers  Dinner: frozen  meal (lo mien)  Typical snacks: limited - apple, club crackers, fruit snacks  Eating out: limited  Beverages:   Water - 5-8 cups (tall glasses) of water/day  Pop (zero sugar)  Coffee with sweetener - 1 cup/day  Tea  Exercise:   Patient is planning on staying inside for exercise d/t her mental health at this time - focusing on chores, cleaning  Patient will plan on taking short walks outside for fresh air and may do exercise videos at home  Nutrition Diagnosis:    Obesity related to overeating and poor lifestyle habits as evidenced by BMI 40.03    Intervention:  Food and/or nutrient delivery: stay consistent with three meals per day - aim to have breakfast consistently in the morning. Try having a servings or fruits or vegetables each day. Stay consistent with movement during the day.   Nutrition education: Workout options    Monitoring/Evaluation:    Goals:  Eat breakfast daily  Aim to have a serving of fruits or vegetables daily. Incorporate fresh, frozen, canned fruits and vegetables with meals   Stay consistent with activity    Patient to follow up in 2 month(s) with bariatrician and 4 month(s) with RD      Video-Visit Details    Type of service:  Video Visit    Video Start Time (time video started): 11:26 AM    Video End Time (time video stopped): 11:39 AM    Originating Location (pt. Location): Home      Distant Location (provider location):  Off-site    Mode of Communication:  Video Conference via [facility]    Physician has received verbal consent for a Video Visit from the patient? Yes      Karly Baca RD           Again, thank you for allowing me to participate in the care of your patient.        Sincerely,        Karly Baca RD    Electronically signed

## 2025-03-18 ENCOUNTER — TRANSFERRED RECORDS (OUTPATIENT)
Dept: HEALTH INFORMATION MANAGEMENT | Facility: CLINIC | Age: 40
End: 2025-03-18
Payer: COMMERCIAL

## 2025-03-25 ENCOUNTER — TELEPHONE (OUTPATIENT)
Dept: PHARMACY | Facility: CLINIC | Age: 40
End: 2025-03-25
Payer: COMMERCIAL

## 2025-03-25 NOTE — TELEPHONE ENCOUNTER
[facility] Recruitment: [facility] insurance     Referral outreach attempt #1 on March 25, 2025      Outcome: call attempted, could not leave voicemail     Kirstin Walker [facility]  -[facility]  [phone]

## 2025-03-31 ENCOUNTER — TRANSFERRED RECORDS (OUTPATIENT)
Dept: HEALTH INFORMATION MANAGEMENT | Facility: CLINIC | Age: 40
End: 2025-03-31
Payer: COMMERCIAL

## 2025-04-01 DIAGNOSIS — J30.1 SEASONAL ALLERGIC RHINITIS DUE TO POLLEN: ICD-10-CM

## 2025-04-01 RX ORDER — LEVOCETIRIZINE DIHYDROCHLORIDE 5 MG/1
5 TABLET, FILM COATED ORAL EVERY EVENING
Qty: 90 TABLET | Refills: 1 | OUTPATIENT
Start: 2025-04-01

## 2025-04-01 NOTE — TELEPHONE ENCOUNTER
Requested Prescriptions   Pending Prescriptions Disp Refills    levocetirizine (XYZAL) 5 MG tablet 90 tablet 1     Sig: Take 1 tablet (5 mg) by mouth every evening.       There is no refill protocol information for this order        Last OV 2/29/2024 with Dr. Chewerea

## 2025-04-01 NOTE — TELEPHONE ENCOUNTER
Pt was last seen by Dr Shin on 2/29/2024 virtually. Pt will need to be seen for refills.     Pt had recent appointment with outside Allergy provider at [facility] on 10/2/2024 and this medication was changed to Allegra.    Callie VELASQUEZ RN  Specialty/Allergy Clinics

## 2025-04-03 ENCOUNTER — MYC MEDICAL ADVICE (OUTPATIENT)
Dept: FAMILY MEDICINE | Facility: CLINIC | Age: 40
End: 2025-04-03

## 2025-04-07 ENCOUNTER — OFFICE VISIT (OUTPATIENT)
Dept: FAMILY MEDICINE | Facility: CLINIC | Age: 40
End: 2025-04-07
Payer: COMMERCIAL

## 2025-04-07 VITALS
TEMPERATURE: 96.9 F | SYSTOLIC BLOOD PRESSURE: 122 MMHG | BODY MASS INDEX: 40.68 KG/M2 | WEIGHT: 229.6 LBS | HEIGHT: 63 IN | RESPIRATION RATE: 16 BRPM | DIASTOLIC BLOOD PRESSURE: 84 MMHG | OXYGEN SATURATION: 95 % | HEART RATE: 81 BPM

## 2025-04-07 DIAGNOSIS — Z23 NEED FOR VACCINATION: ICD-10-CM

## 2025-04-07 DIAGNOSIS — Z12.31 VISIT FOR SCREENING MAMMOGRAM: Primary | ICD-10-CM

## 2025-04-07 PROCEDURE — 90651 9VHPV VACCINE 2/3 DOSE IM: CPT | Performed by: NURSE PRACTITIONER

## 2025-04-07 PROCEDURE — 99213 OFFICE O/P EST LOW 20 MIN: CPT | Mod: 25 | Performed by: NURSE PRACTITIONER

## 2025-04-07 PROCEDURE — 3074F SYST BP LT 130 MM HG: CPT | Performed by: NURSE PRACTITIONER

## 2025-04-07 PROCEDURE — 3079F DIAST BP 80-89 MM HG: CPT | Performed by: NURSE PRACTITIONER

## 2025-04-07 PROCEDURE — 90471 IMMUNIZATION ADMIN: CPT | Performed by: NURSE PRACTITIONER

## 2025-04-07 PROCEDURE — 1126F AMNT PAIN NOTED NONE PRSNT: CPT | Performed by: NURSE PRACTITIONER

## 2025-04-07 ASSESSMENT — PAIN SCALES - GENERAL: PAINLEVEL_OUTOF10: NO PAIN (0)

## 2025-04-07 ASSESSMENT — PATIENT HEALTH QUESTIONNAIRE - PHQ9
10. IF YOU CHECKED OFF ANY PROBLEMS, HOW DIFFICULT HAVE THESE PROBLEMS MADE IT FOR YOU TO DO YOUR WORK, TAKE CARE OF THINGS AT HOME, OR GET ALONG WITH OTHER PEOPLE: EXTREMELY DIFFICULT
SUM OF ALL RESPONSES TO PHQ QUESTIONS 1-9: 16
SUM OF ALL RESPONSES TO PHQ QUESTIONS 1-9: 16

## 2025-04-07 NOTE — NURSING NOTE
Prior to immunization administration, verified patients identity using patient s name and date of birth. Please see Immunization Activity for additional information.     Screening Questionnaire for Adult Immunization    Are you sick today?   No   Do you have allergies to medications, food, a vaccine component or latex?   No   Have you ever had a serious reaction after receiving a vaccination?   No   Do you have a long-term health problem with heart, lung, kidney, or metabolic disease (e.g., diabetes), asthma, a blood disorder, no spleen, complement component deficiency, a cochlear implant, or a spinal fluid leak?  Are you on long-term aspirin therapy?   No   Do you have cancer, leukemia, HIV/AIDS, or any other immune system problem?   No   Do you have a parent, brother, or sister with an immune system problem?   No   In the past 3 months, have you taken medications that affect  your immune system, such as prednisone, other steroids, or anticancer drugs; drugs for the treatment of rheumatoid arthritis, Crohn s disease, or psoriasis; or have you had radiation treatments?   No   Have you had a seizure, or a brain or other nervous system problem?   No   During the past year, have you received a transfusion of blood or blood    products, or been given immune (gamma) globulin or antiviral drug?   No   For women: Are you pregnant or is there a chance you could become       pregnant during the next month?   No   Have you received any vaccinations in the past 4 weeks?   No     Immunization questionnaire answers were all negative.      Patient instructed to remain in clinic for 15 minutes afterwards, and to report any adverse reactions.     Screening performed by Eduardo Kingsley CMA on 4/7/2025 at 3:47 PM.

## 2025-04-07 NOTE — PROGRESS NOTES
"  Assessment & Plan     Visit for screening mammogram  No abnormalities on exam   May proceed with routine screening mammogram.  - MA Screen Bilateral w/Cristino; Future    Need for vaccination  - HPV 9Y+ (Gardasil 9)      The risks, benefits and treatment options of prescribed medications or other treatments have been discussed with the patient. The patient verbalized their understanding and should call or follow up if no improvement or if they develop further problems.  Shirley Pete, CNP                  Subjective   Maddy is a 39 year old, presenting for the following health issues:  Breast Problem (Lump on Right Breast) and Imm/Inj (HPV #3 ( only has had 2) )        4/7/2025     3:30 PM   Additional Questions   Roomed by Eduardo MACIAS CMA   Accompanied by self     History of Present Illness       Reason for visit:  Bump on right breast  Symptom onset:  1-2 weeks ago  Symptom intensity:  Mild  Symptom progression:  Staying the same  Had these symptoms before:  No  What makes it worse:  No  What makes it better:  No She is missing 1 dose(s) of medications per week.  She is not taking prescribed medications regularly due to remembering to take.      No pain  Some itching at first, not now.  No bleeding.  No nipple discharge    Has never had this before    Seems to be getting better on it's own.              Review of Systems  Constitutional, neuro, ENT, endocrine, pulmonary, cardiac, gastrointestinal, genitourinary, musculoskeletal, integument and psychiatric systems are negative, except as otherwise noted.      Objective    /84 (BP Location: Right arm, Patient Position: Sitting, Cuff Size: Adult Regular)   Pulse 81   Temp 96.9  F (36.1  C) (Tympanic)   Resp 16   Ht 1.6 m (5' 3\")   Wt 104.1 kg (229 lb 9.6 oz)   SpO2 95%   BMI 40.67 kg/m    Body mass index is 40.67 kg/m .  Physical Exam   GENERAL: alert and no distress  BREAST: right breast: 1 cm area of skin peeling with underlying superficial firmness. No " discreet masses palpable.            Signed Electronically by: PHILL Luo CNP

## 2025-04-15 ENCOUNTER — TELEPHONE (OUTPATIENT)
Dept: PHARMACY | Facility: CLINIC | Age: 40
End: 2025-04-15
Payer: COMMERCIAL

## 2025-04-15 NOTE — TELEPHONE ENCOUNTER
[facility] Recruitment: [facility] insurance     Referral outreach attempt #3 on April 15, 2025      Outcome: left voicemail- Call back number [phone]    Kirstin Walker [facility]  -[facility]  [phone]

## 2025-04-27 DIAGNOSIS — E11.9 TYPE 2 DIABETES MELLITUS WITHOUT COMPLICATION, WITHOUT LONG-TERM CURRENT USE OF INSULIN (H): ICD-10-CM

## 2025-04-28 ENCOUNTER — MYC MEDICAL ADVICE (OUTPATIENT)
Dept: FAMILY MEDICINE | Facility: CLINIC | Age: 40
End: 2025-04-28
Payer: COMMERCIAL

## 2025-04-28 DIAGNOSIS — E11.9 TYPE 2 DIABETES MELLITUS WITHOUT COMPLICATION, WITHOUT LONG-TERM CURRENT USE OF INSULIN (H): ICD-10-CM

## 2025-04-28 RX ORDER — LIRAGLUTIDE 6 MG/ML
1.8 INJECTION SUBCUTANEOUS DAILY
Qty: 9 ML | Refills: 11 | Status: SHIPPED | OUTPATIENT
Start: 2025-04-28

## 2025-04-28 RX ORDER — PEN NEEDLE, DIABETIC 32GX 5/32"
NEEDLE, DISPOSABLE MISCELLANEOUS
Qty: 100 EACH | Refills: 11 | Status: SHIPPED | OUTPATIENT
Start: 2025-04-28

## 2025-04-28 RX ORDER — LIRAGLUTIDE 6 MG/ML
INJECTION SUBCUTANEOUS
OUTPATIENT
Start: 2025-04-28

## 2025-05-01 NOTE — PROGRESS NOTES
Bariatric Care Clinic Non Surgical Follow up Visit   Date of visit: 5/5/2025  Physician: LONDON Serna MD, MD  Primary Care is Shirley Freeman.  Maddy Ortiz   39 year old  female     Initial Weight: 202#  Initial BMI: 34.14  Today's Weight:   Wt Readings from Last 1 Encounters:   05/05/25 104.8 kg (231 lb)     Body mass index is 40.28 kg/m .           Assessment and Plan   Assessment: Maddy is a 39 year old year old female who presents for medical weight management.      Plan:    1. Morbid obesity (H) (Primary)  Patient was congratulated on the healthy changes she has made thus far. Healthy habits to assist with further weight loss were discussed. She is going to get 20 gms of protein per meal and <10 gms of sugar per meal. She is going to cut back her lunch to one sandwich. She is going to add more vegetables. She will continue the naltrexone and victoza. We discussed the patient's co-morbid conditions including DM and FLD. These likely will improve with healthy habits and weight loss.     2. Type 2 diabetes mellitus without complication, without long-term current use of insulin (H)  This has improved with healthy habits and weight loss.     3. Fatty liver  This may improve with healthy habits and weight loss.      Follow up As scheduled with the dietician and next available with myself            INTERIM HISTORY  Patient is taking victoza for DM, appetite and craving control and she thinks they are helping.She is also taking naltrexone.    Goals set with dietician 3/3/25:  Eat breakfast daily  Aim to have a serving of fruits or vegetables daily. Incorporate fresh, frozen, canned fruits and vegetables with meals   Stay consistent with activity    DIETARY HISTORY  Meals Per Day: 3  Eating Protein First?: sometimes  Food Diary: B:eggs or whole grain mini wheats with milk L:tuna in a sandwich with 4 pieces of bread D:moms meals (macaroni and cheese, risoto)  Snacks Per Day: rare  Typical Snack: protein  bar  Fluid Intake: zero sugar soda, some water  Portion Control: yes per patient  Calorie Containing Beverages: very rare      Positive Changes Since Last Visit: eating breakfast  Struggling With: high carb diet ,food insecurtiy    Knowledgeable in Reading Food Labels: yes  Getting Adequate Protein: no  Sleeping 7-8 hours/day Not discussed       PHYSICAL ACTIVITY PATTERNS:  She recently started walking and biking, 5-60 minutes (she walks very slow) daily    REVIEW OF SYSTEMS  GENERAL/CONSTITUTIONAL:  Fatigue: yes  HEENT:   glaucoma: no  CARDIOVASCULAR:  History of heart disease: no  GI:  Pancreatitis: no  PSYCHIATRIC:  Moods: fairly stable  ENDOCRINE:  No personal or family history of medullary thyroid cancer no  No personal or family history of MEN2   :  Birth control: mirena IUD  History of kidney stones: no     Patient Profile   Social History     Social History Narrative    One child    Education: some college        02/29/24        ENVIRONMENTAL HISTORY: [place] The home is heated with a electric furnace. They do not have central air conditioning, does have box air conditioner in the wall and has air purifier. The patient's bedroom is furnished with stuffed animals in bed, carpeting in bedroom and fabric window coverings. Pets inside the apartment includes 1 cat. There is history of cockroach or mice infestation. There are no smokers in the house.  The apartment does not have a basement.             Past Medical History   Past Medical History:   Diagnosis Date    Bipolar 1 disorder (H) 12/6/2012    Depressive disorder     Diabetes mellitus, type 2 (H) 12/13/2021    Paranoid type delusional disorder (H) 11/14/2012     Patient Active Problem List   Diagnosis    Animal dander allergy    CARDIOVASCULAR SCREENING; LDL GOAL LESS THAN 160    Psychosis (H)    Paranoid type delusional disorder (H)    Bipolar 1 disorder (H)    Insomnia    Depression with anxiety     "Seasonal allergic rhinitis due to pollen    Allergic rhinitis due to mold    Allergic rhinitis due to animal dander    Allergic rhinitis due to dust mite    Schizoaffective disorder, bipolar type (H)    Gastroesophageal reflux disease without esophagitis    Paranoia (psychosis) (H)    Morbid obesity (H)    Umbilical hernia without obstruction and without gangrene    Fatty liver    Type 2 diabetes mellitus without complication, without long-term current use of insulin (H)    Disorganized behavior    Infection due to 2019 novel coronavirus    Polypharmacy    Sedated due to multiple medications    Overdose, undetermined intent, initial encounter    Segmental and somatic dysfunction    SI (sacroiliac) joint dysfunction    mirena IUD 9/30/22    Neck pain    Hip pain, right    Chronic pain of both knees    Difficulty communicating    Difficulty using pragmatics in communication    Acquired hypothyroidism    Hyperlipidemia LDL goal <100       Past Surgical History  She has a past surgical history that includes tonsillectomy & adenoidectomy; tonsillectomy & adenoidectomy; and Herniorrhaphy, Umbilical, Robot-Assisted, Laparoscopic, Using Da Maeve Xi (N/A, 9/26/2024).     Examination   Ht 1.613 m (5' 3.5\")   Wt 104.8 kg (231 lb)   BMI 40.28 kg/m    Wt Readings from Last 4 Encounters:   05/05/25 104.8 kg (231 lb)   04/07/25 104.1 kg (229 lb 9.6 oz)   02/14/25 102.5 kg (226 lb)   02/14/25 102.5 kg (226 lb)      BP Readings from Last 3 Encounters:   04/07/25 122/84   02/14/25 130/78   02/14/25 122/86      GENERAL: alert and no distress  EYES: Eyes grossly normal to inspection.  No discharge or erythema, or obvious scleral/conjunctival abnormalities.  RESP: No audible wheeze, cough, or visible cyanosis.    SKIN: Visible skin clear. No significant rash, abnormal pigmentation or lesions.  NEURO: Cranial nerves grossly intact.  Mentation and speech appropriate for age.  PSYCH: Appropriate affect, tone, and pace of words    "     Counseling:   We reviewed the important post op bariatric recommendations:  -eating 3 meals daily  -eating protein first, getting >60gm protein daily  -eating slowly, chewing food well  -avoiding/limiting calorie containing beverages  -limiting starchy vegetables and carbohydrates, choosing wheat, not white with breads,   crackers, pastas, marquita, bagels, tortillas, rice  -limiting restaurant or cafeteria eating to twice a week or less    We discussed the importance of restorative sleep and stress management in maintaining a healthy weight.  We discussed the National Weight Control Registry healthy weight maintenance strategies and ways to optimize metabolism.  We discussed the importance of physical activity including cardiovascular and strength training in maintaining a healthier weight.    Total time spent on the date of this encounter doing: chart review, review of test results, patient visit, physical exam, education, counseling, developing plan of care and documenting = 33 minutes.         LONDON Serna MD  [facility]

## 2025-05-05 ENCOUNTER — VIRTUAL VISIT (OUTPATIENT)
Dept: SURGERY | Facility: CLINIC | Age: 40
End: 2025-05-05
Payer: COMMERCIAL

## 2025-05-05 VITALS — BODY MASS INDEX: 39.44 KG/M2 | WEIGHT: 231 LBS | HEIGHT: 64 IN

## 2025-05-05 DIAGNOSIS — E11.9 TYPE 2 DIABETES MELLITUS WITHOUT COMPLICATION, WITHOUT LONG-TERM CURRENT USE OF INSULIN (H): ICD-10-CM

## 2025-05-05 DIAGNOSIS — K76.0 FATTY LIVER: ICD-10-CM

## 2025-05-05 DIAGNOSIS — E66.01 MORBID OBESITY (H): Primary | ICD-10-CM

## 2025-05-05 PROCEDURE — 98006 SYNCH AUDIO-VIDEO EST MOD 30: CPT | Performed by: FAMILY MEDICINE

## 2025-05-05 PROCEDURE — 1126F AMNT PAIN NOTED NONE PRSNT: CPT | Mod: 95 | Performed by: FAMILY MEDICINE

## 2025-05-05 RX ORDER — NALTREXONE HYDROCHLORIDE 50 MG/1
25 TABLET, FILM COATED ORAL
Qty: 45 TABLET | Refills: 1 | Status: SHIPPED | OUTPATIENT
Start: 2025-05-05

## 2025-05-05 RX ORDER — LIRAGLUTIDE 6 MG/ML
1.8 INJECTION SUBCUTANEOUS DAILY
Qty: 27 ML | Refills: 1 | Status: SHIPPED | OUTPATIENT
Start: 2025-05-05

## 2025-05-05 ASSESSMENT — PAIN SCALES - GENERAL: PAINLEVEL_OUTOF10: NO PAIN (0)

## 2025-05-05 NOTE — PATIENT INSTRUCTIONS
Eat Better ? Move More ? Live Well    Eat 3 nutrient-rich meals each day    Don t skip meals--it will cause you to overeat later in the day!    Eating fiber (vegetables/fruits/whole grains) and protein with meals helps you stay full longer    Choose foods with less than 10 grams of sugar and 5 grams of fat per serving to prevent excess calories and weight gain  Eat around the same times each day to develop a routine eating schedule   Avoid snacking unless physically hungry.   Planned snacks: 1-2 times per day and no more than 150 calories    Eat protein first   Protein helps with healing, maintaining adequate muscle mass, reducing hunger and optimizing nutritional status   Aim for 60-80 grams of protein per day   Fill up on Fiber   Fiber comes from plants--fruits, veggies, whole grains, nuts/seeds and beans   Fiber is low in calories, high in phytonutrients and helps you stay full longer   Aim for 25-35 grams per day by eating fiber with meals and snacks  Eat [id number]   Take 20-30 minutes to eat each meal by taking small bites, chewing foods to applesauce consistency or 20-30 times before you swallow   Eating foods too fast can delay satiety/fullness signals and increase overeating   Slow down your eating by using toddler utensils, putting your fork/spoon down between bites and not watching TV or emailing during meals!   Keep a Journal         Writing down what you eat, how you feel and when you are active helps you identify new changes to work on from week to week         Look for ways to cut 100 calories from your current diet 2-3 times per day  Drink 64 ounces of 0-Calorie drinks between meals   Water   Zero calorie Propel  or Vitamin Water     SoBe [employer]  Zero Calories   Crystal Light , Sugar-Free Bronw-Aid , and other sugar-free lemonade or flavored lara   Keep Caffeine to less than 300mg per day ie: 3-6oz cups coffee     Work up to 45-60 minutes of physical activity most days of the week   Helps with  losing weight and prevent regaining those extra pounds!    Do a combo of cardio (walking/water exercises) and strength training (lifting weights/Vinyasa yoga)    Avoid Mindless Eating   Be present when you eat--take note of the smell, taste and quality of your food   Make a list of alternative activities you could do to prevent eating out of boredom/stress  Go for a walk, call a friend, chew gum, paint your nails, re-organize the garage, etc      

## 2025-05-05 NOTE — PROGRESS NOTES
Virtual Visit Details    Type of service:  Video Visit   Video Start Time: 10:40 AM  Video End Time:11:01 AM    Originating Location (pt. Location): Home    Distant Location (provider location):  Off-site  Platform used for Video Visit: Perri

## 2025-05-05 NOTE — LETTER
5/5/2025      Maddy Ortiz  [address]  [facility] [address]      Dear Colleague,    Thank you for referring your patient, Maddy Ortiz, to the [facility] [place]. Please see a copy of my visit note below.    Bariatric Care Clinic Non Surgical Follow up Visit   Date of visit: 5/5/2025  Physician: LONDON Serna MD, MD  Primary Care is Shirley Freeman.  Maddy Ortiz   39 year old  female     Initial Weight: 202#  Initial BMI: 34.14  Today's Weight:   Wt Readings from Last 1 Encounters:   05/05/25 104.8 kg (231 lb)     Body mass index is 40.28 kg/m .           Assessment and Plan   Assessment: Maddy is a 39 year old year old female who presents for medical weight management.      Plan:    1. Morbid obesity (H) (Primary)  Patient was congratulated on the healthy changes she has made thus far. Healthy habits to assist with further weight loss were discussed. She is going to get 20 gms of protein per meal and <10 gms of sugar per meal. She is going to cut back her lunch to one sandwich. She is going to add more vegetables. She will continue the naltrexone and victoza. We discussed the patient's co-morbid conditions including DM and FLD. These likely will improve with healthy habits and weight loss.     2. Type 2 diabetes mellitus without complication, without long-term current use of insulin (H)  This has improved with healthy habits and weight loss.     3. Fatty liver  This may improve with healthy habits and weight loss.      Follow up As scheduled with the dietician and next available with myself            INTERIM HISTORY  Patient is taking victoza for DM, appetite and craving control and she thinks they are helping.She is also taking naltrexone.    Goals set with dietician 3/3/25:  Eat breakfast daily  Aim to have a serving of fruits or vegetables daily. Incorporate fresh, frozen, canned fruits and vegetables with meals   Stay consistent with  activity    DIETARY HISTORY  Meals Per Day: 3  Eating Protein First?: sometimes  Food Diary: B:eggs or whole grain mini wheats with milk L:tuna in a sandwich with 4 pieces of bread D:moms meals (macaroni and cheese, risoto)  Snacks Per Day: rare  Typical Snack: protein bar  Fluid Intake: zero sugar soda, some water  Portion Control: yes per patient  Calorie Containing Beverages: very rare      Positive Changes Since Last Visit: eating breakfast  Struggling With: high carb diet ,food insecurtiy    Knowledgeable in Reading Food Labels: yes  Getting Adequate Protein: no  Sleeping 7-8 hours/day Not discussed       PHYSICAL ACTIVITY PATTERNS:  She recently started walking and biking, 5-60 minutes (she walks very slow) daily    REVIEW OF SYSTEMS  GENERAL/CONSTITUTIONAL:  Fatigue: yes  HEENT:   glaucoma: no  CARDIOVASCULAR:  History of heart disease: no  GI:  Pancreatitis: no  PSYCHIATRIC:  Moods: fairly stable  ENDOCRINE:  No personal or family history of medullary thyroid cancer no  No personal or family history of MEN2   :  Birth control: mirena IUD  History of kidney stones: no     Patient Profile   Social History     Social History Narrative    One child    Education: some college        02/29/24        ENVIRONMENTAL HISTORY: [place] The home is heated with a electric furnace. They do not have central air conditioning, does have box air conditioner in the wall and has air purifier. The patient's bedroom is furnished with stuffed animals in bed, carpeting in bedroom and fabric window coverings. Pets inside the apartment includes 1 cat. There is history of cockroach or mice infestation. There are no smokers in the house.  The apartment does not have a basement.             Past Medical History   Past Medical History:   Diagnosis Date     Bipolar 1 disorder (H) 12/6/2012     Depressive disorder      Diabetes mellitus, type 2 (H) 12/13/2021     Paranoid type  "delusional disorder (H) 11/14/2012     Patient Active Problem List   Diagnosis     Animal dander allergy     CARDIOVASCULAR SCREENING; LDL GOAL LESS THAN 160     Psychosis (H)     Paranoid type delusional disorder (H)     Bipolar 1 disorder (H)     Insomnia     Depression with anxiety     Seasonal allergic rhinitis due to pollen     Allergic rhinitis due to mold     Allergic rhinitis due to animal dander     Allergic rhinitis due to dust mite     Schizoaffective disorder, bipolar type (H)     Gastroesophageal reflux disease without esophagitis     Paranoia (psychosis) (H)     Morbid obesity (H)     Umbilical hernia without obstruction and without gangrene     Fatty liver     Type 2 diabetes mellitus without complication, without long-term current use of insulin (H)     Disorganized behavior     Infection due to 2019 novel coronavirus     Polypharmacy     Sedated due to multiple medications     Overdose, undetermined intent, initial encounter     Segmental and somatic dysfunction     SI (sacroiliac) joint dysfunction     mirena IUD 9/30/22     Neck pain     Hip pain, right     Chronic pain of both knees     Difficulty communicating     Difficulty using pragmatics in communication     Acquired hypothyroidism     Hyperlipidemia LDL goal <100       Past Surgical History  She has a past surgical history that includes tonsillectomy & adenoidectomy; tonsillectomy & adenoidectomy; and Herniorrhaphy, Umbilical, Robot-Assisted, Laparoscopic, Using Da Maeve Xi (N/A, 9/26/2024).     Examination   Ht 1.613 m (5' 3.5\")   Wt 104.8 kg (231 lb)   BMI 40.28 kg/m    Wt Readings from Last 4 Encounters:   05/05/25 104.8 kg (231 lb)   04/07/25 104.1 kg (229 lb 9.6 oz)   02/14/25 102.5 kg (226 lb)   02/14/25 102.5 kg (226 lb)      BP Readings from Last 3 Encounters:   04/07/25 122/84   02/14/25 130/78   02/14/25 122/86      GENERAL: alert and no distress  EYES: Eyes grossly normal to inspection.  No discharge or erythema, or obvious " scleral/conjunctival abnormalities.  RESP: No audible wheeze, cough, or visible cyanosis.    SKIN: Visible skin clear. No significant rash, abnormal pigmentation or lesions.  NEURO: Cranial nerves grossly intact.  Mentation and speech appropriate for age.  PSYCH: Appropriate affect, tone, and pace of words        Counseling:   We reviewed the important post op bariatric recommendations:  -eating 3 meals daily  -eating protein first, getting >60gm protein daily  -eating slowly, chewing food well  -avoiding/limiting calorie containing beverages  -limiting starchy vegetables and carbohydrates, choosing wheat, not white with breads,   crackers, pastas, marquita, bagels, tortillas, rice  -limiting restaurant or cafeteria eating to twice a week or less    We discussed the importance of restorative sleep and stress management in maintaining a healthy weight.  We discussed the National Weight Control Registry healthy weight maintenance strategies and ways to optimize metabolism.  We discussed the importance of physical activity including cardiovascular and strength training in maintaining a healthier weight.    Total time spent on the date of this encounter doing: chart review, review of test results, patient visit, physical exam, education, counseling, developing plan of care and documenting = 33 minutes.         LONDON Serna MD  [facility]           Virtual Visit Details    Type of service:  Video Visit   Video Start Time: 10:40 AM  Video End Time:11:01 AM    Originating Location (pt. Location): Home    Distant Location (provider location):  Off-site  Platform used for Video Visit: AmWell      Again, thank you for allowing me to participate in the care of your patient.        Sincerely,        LONDON Serna MD    Electronically signed

## 2025-05-05 NOTE — PLAN OF CARE
"Goal Outcome Evaluation:    Plan of Care Reviewed With: patient     RN Note:    Patient presents with Blunted/Flat affect and  calm  mood. Patient was calm, cooperative, and pleasant during this shift. Patient denies  SI, SIB, HI, and AVH. Patient reports she still has some anxiety \"my family isn't as close as it once was which makes me cry.\" Patient denies  pain. Patient was visible in the dining room, but remains withdrawn to herself. Patient continues to present with disorganized and tangential speech. Patient continues to journal her thoughts and states her concentration has been poor as of late. Patient  did attend groups. Patient is med-compliant. No medication side effects observed or endorsed by patient.      /88 (BP Location: Left arm, Patient Position: Sitting)   Pulse 101   Temp 98.2  F (36.8  C)   Resp 16   Ht 1.626 m (5' 4\")   Wt 114.1 kg (251 lb 8 oz)   SpO2 98%   BMI 43.17 kg/m                " Miky Shirley (Resident)

## 2025-05-05 NOTE — NURSING NOTE
Current patient location: [address]  [place] [address]    Is the patient currently in the state of [place]? YES    Visit mode: VIDEO    If the visit is dropped, the patient can be reconnected by:VIDEO VISIT: Send to e-mail at: [email or web address]    Will anyone else be joining the visit? NO  (If patient encounters technical issues they should call [phone]    Are changes needed to the allergy or medication list? No    Are refills needed on medications prescribed by this physician? NO    Rooming Documentation:  Questionnaire(s) completed    Reason for visit: RECHKILLIAN ROWLAND

## 2025-05-28 ENCOUNTER — TRANSFERRED RECORDS (OUTPATIENT)
Dept: HEALTH INFORMATION MANAGEMENT | Facility: CLINIC | Age: 40
End: 2025-05-28
Payer: COMMERCIAL

## 2025-06-04 ENCOUNTER — MYC MEDICAL ADVICE (OUTPATIENT)
Dept: FAMILY MEDICINE | Facility: CLINIC | Age: 40
End: 2025-06-04
Payer: COMMERCIAL

## 2025-06-05 ENCOUNTER — OFFICE VISIT (OUTPATIENT)
Dept: FAMILY MEDICINE | Facility: CLINIC | Age: 40
End: 2025-06-05
Payer: COMMERCIAL

## 2025-06-05 VITALS
SYSTOLIC BLOOD PRESSURE: 116 MMHG | DIASTOLIC BLOOD PRESSURE: 82 MMHG | TEMPERATURE: 99.2 F | HEIGHT: 65 IN | WEIGHT: 230 LBS | RESPIRATION RATE: 16 BRPM | OXYGEN SATURATION: 96 % | BODY MASS INDEX: 38.32 KG/M2 | HEART RATE: 85 BPM

## 2025-06-05 DIAGNOSIS — F41.9 SEVERE ANXIETY: Primary | ICD-10-CM

## 2025-06-05 DIAGNOSIS — E11.9 TYPE 2 DIABETES MELLITUS WITHOUT COMPLICATION, WITHOUT LONG-TERM CURRENT USE OF INSULIN (H): ICD-10-CM

## 2025-06-05 ASSESSMENT — PATIENT HEALTH QUESTIONNAIRE - PHQ9
5. POOR APPETITE OR OVEREATING: NOT AT ALL
SUM OF ALL RESPONSES TO PHQ QUESTIONS 1-9: 7

## 2025-06-05 ASSESSMENT — ANXIETY QUESTIONNAIRES
7. FEELING AFRAID AS IF SOMETHING AWFUL MIGHT HAPPEN: NEARLY EVERY DAY
3. WORRYING TOO MUCH ABOUT DIFFERENT THINGS: NEARLY EVERY DAY
5. BEING SO RESTLESS THAT IT IS HARD TO SIT STILL: NOT AT ALL
GAD7 TOTAL SCORE: 10
6. BECOMING EASILY ANNOYED OR IRRITABLE: NOT AT ALL
GAD7 TOTAL SCORE: 10
2. NOT BEING ABLE TO STOP OR CONTROL WORRYING: SEVERAL DAYS
1. FEELING NERVOUS, ANXIOUS, OR ON EDGE: NEARLY EVERY DAY

## 2025-06-05 ASSESSMENT — PAIN SCALES - GENERAL: PAINLEVEL_OUTOF10: NO PAIN (0)

## 2025-06-05 ASSESSMENT — ENCOUNTER SYMPTOMS: NERVOUS/ANXIOUS: 1

## 2025-06-05 NOTE — PROGRESS NOTES
Assessment & Plan   Problem List Items Addressed This Visit          Endocrine    Type 2 diabetes mellitus without complication, without long-term current use of insulin (H)     Other Visit Diagnoses         Severe anxiety    -  Primary    Relevant Orders    Adult Mental Health  Referral             39-year-old female presenting to clinic today for worsening anxiety and depression.  Urgent referral placed for therapy.  She will be seeing her psychiatrist tomorrow for possible management of medication.  I urged the patient to go to the ER should her symptoms worsen.     Follow-up       Subjective   Maddy is a 39 year old, presenting for the following health issues:  Anxiety (Here to discuss about anxiety and depression. )        6/5/2025    11:35 AM   Additional Questions   Roomed by Samantha Cutler CMA   Accompanied by Self     Anxiety          Patient is a 39-year-old female with past medical history significant for generalized anxiety disorder, major depression, schizoaffective disorder, bipolar disorder, impulsive behavior here for worsening anxiety and depression.  She is on several medications for her mental health which she says she is consistently taking.      She was seen by the psychiatrist through [facility] a few days ago.      She appeared agitated during the encounter. She reports she has had increasing anxiety over the last couple of days due to her daughter who is dating a man that she is not comfortable with.  She went on a tangent during the conversation talking about communism in the  region and her speech seemed pressured.      I discussed at length with the patient and recommended seeing her psychiatrist whom she has an appointment with tomorrow to discuss management of her medications.  I made another referral for her to be seen by a psychologist/counselor today.  Patient was encouraged to consider an ER visit if her symptoms persist.     Depression and Anxiety   How  are you doing with your depression since your last visit? No change  How are you doing with your anxiety since your last visit?  Worsened   Are you having other symptoms that might be associated with depression or anxiety? No  Have you had a significant life event? OTHER: She and her  are concerned about the person who her daughter is dating.   Do you have any concerns with your use of alcohol or other drugs? No    Social History     Tobacco Use    Smoking status: Former     Current packs/day: 0.50     Types: Cigarettes    Smokeless tobacco: Former    Tobacco comments:     around 2nd hand smoke   Vaping Use    Vaping status: Never Used   Substance Use Topics    Alcohol use: Not Currently    Drug use: Not Currently         9/24/2024    10:16 AM 4/7/2025     3:25 PM 6/5/2025    12:16 PM   PHQ   PHQ-9 Total Score 5 16  7   Q9: Thoughts of better off dead/self-harm past 2 weeks Not at all Not at all Not at all       Patient-reported         11/15/2024    10:57 AM 1/24/2025     8:00 AM 6/5/2025    12:16 PM   AYAKA-7 SCORE   Total Score 11 (moderate anxiety) 19 (severe anxiety)    Total Score 11  19  10       Patient-reported         6/5/2025    12:16 PM   Last PHQ-9   1.  Little interest or pleasure in doing things 0   2.  Feeling down, depressed, or hopeless 1   3.  Trouble falling or staying asleep, or sleeping too much 3   4.  Feeling tired or having little energy 3   5.  Poor appetite or overeating 0   6.  Feeling bad about yourself 0   7.  Trouble concentrating 0   8.  Moving slowly or restless 0   Q9: Thoughts of better off dead/self-harm past 2 weeks 0   PHQ-9 Total Score 7         6/5/2025    12:16 PM   AYAKA-7    1. Feeling nervous, anxious, or on edge 3   2. Not being able to stop or control worrying 1   3. Worrying too much about different things 3   4. Trouble relaxing 0   5. Being so restless that it is hard to sit still 0   6. Becoming easily annoyed or irritable 0   7. Feeling afraid, as if something  "awful might happen 3   AYAKA-7 Total Score 10       Suicide Assessment Five-step Evaluation and Treatment (SAFE-T)    How many servings of fruits and vegetables do you eat daily?  0-1  On average, how many sweetened beverages do you drink each day (Examples: soda, juice, sweet tea, etc.  Do NOT count diet or artificially sweetened beverages)?   0  How many days per week do you exercise enough to make your heart beat faster? 5  How many minutes a day do you exercise enough to make your heart beat faster? 60 or more  How many days per week do you miss taking your medication? 0          Review of Systems  CONSTITUTIONAL: NEGATIVE for fever, chills, change in weight  ENT/MOUTH: NEGATIVE for ear, mouth and throat problems  RESP: NEGATIVE for significant cough or SOB  CV: NEGATIVE for chest pain, palpitations or peripheral edema  PSYCHIATRIC: POSITIVE for agitation, anxiety, depressed mood, HX anxiety, and HX depression      Objective    /82 (BP Location: Right arm, Patient Position: Chair, Cuff Size: Adult Large)   Pulse 85   Temp 99.2  F (37.3  C) (Tympanic)   Resp 16   Ht 1.651 m (5' 5\")   Wt 104.3 kg (230 lb)   SpO2 96%   BMI 38.27 kg/m    Body mass index is 38.27 kg/m .  Physical Exam   GENERAL: alert and no distress  NECK: no adenopathy, no asymmetry, masses, or scars  MS: no gross musculoskeletal defects noted, no edema  SKIN: no suspicious lesions or rashes  PSYCH: mentation appears normal, affect flat, anxious, speech pressured, judgement and insight intact, and appearance well groomed            Signed Electronically by: Deni Fernandez MD    "

## 2025-06-09 ENCOUNTER — VIRTUAL VISIT (OUTPATIENT)
Dept: PSYCHOLOGY | Facility: CLINIC | Age: 40
End: 2025-06-09
Payer: COMMERCIAL

## 2025-06-09 DIAGNOSIS — F41.1 GENERALIZED ANXIETY DISORDER: Primary | ICD-10-CM

## 2025-06-09 PROCEDURE — 90834 PSYTX W PT 45 MINUTES: CPT | Mod: 95 | Performed by: PSYCHOLOGIST

## 2025-06-09 ASSESSMENT — PATIENT HEALTH QUESTIONNAIRE - PHQ9
10. IF YOU CHECKED OFF ANY PROBLEMS, HOW DIFFICULT HAVE THESE PROBLEMS MADE IT FOR YOU TO DO YOUR WORK, TAKE CARE OF THINGS AT HOME, OR GET ALONG WITH OTHER PEOPLE: EXTREMELY DIFFICULT
SUM OF ALL RESPONSES TO PHQ QUESTIONS 1-9: 9
SUM OF ALL RESPONSES TO PHQ QUESTIONS 1-9: 9

## 2025-06-11 DIAGNOSIS — E03.9 ACQUIRED HYPOTHYROIDISM: ICD-10-CM

## 2025-06-11 RX ORDER — LEVOTHYROXINE SODIUM 50 UG/1
50 TABLET ORAL
Qty: 90 TABLET | Refills: 3 | OUTPATIENT
Start: 2025-06-11

## 2025-06-12 ENCOUNTER — VIRTUAL VISIT (OUTPATIENT)
Dept: PSYCHOLOGY | Facility: CLINIC | Age: 40
End: 2025-06-12
Payer: COMMERCIAL

## 2025-06-12 DIAGNOSIS — F25.0 SCHIZOAFFECTIVE DISORDER, BIPOLAR TYPE (H): Primary | Chronic | ICD-10-CM

## 2025-06-12 PROCEDURE — 90834 PSYTX W PT 45 MINUTES: CPT | Mod: 95 | Performed by: PSYCHOLOGIST

## 2025-06-12 ASSESSMENT — ANXIETY QUESTIONNAIRES
6. BECOMING EASILY ANNOYED OR IRRITABLE: MORE THAN HALF THE DAYS
5. BEING SO RESTLESS THAT IT IS HARD TO SIT STILL: SEVERAL DAYS
GAD7 TOTAL SCORE: 15
4. TROUBLE RELAXING: MORE THAN HALF THE DAYS
7. FEELING AFRAID AS IF SOMETHING AWFUL MIGHT HAPPEN: NEARLY EVERY DAY
1. FEELING NERVOUS, ANXIOUS, OR ON EDGE: MORE THAN HALF THE DAYS
2. NOT BEING ABLE TO STOP OR CONTROL WORRYING: MORE THAN HALF THE DAYS
8. IF YOU CHECKED OFF ANY PROBLEMS, HOW DIFFICULT HAVE THESE MADE IT FOR YOU TO DO YOUR WORK, TAKE CARE OF THINGS AT HOME, OR GET ALONG WITH OTHER PEOPLE?: EXTREMELY DIFFICULT
GAD7 TOTAL SCORE: 15
3. WORRYING TOO MUCH ABOUT DIFFERENT THINGS: NEARLY EVERY DAY
IF YOU CHECKED OFF ANY PROBLEMS ON THIS QUESTIONNAIRE, HOW DIFFICULT HAVE THESE PROBLEMS MADE IT FOR YOU TO DO YOUR WORK, TAKE CARE OF THINGS AT HOME, OR GET ALONG WITH OTHER PEOPLE: EXTREMELY DIFFICULT

## 2025-06-12 NOTE — PROGRESS NOTES
[facility]   Mental Health & Addiction Services     Progress Note - Initial Visit    Patient  Name:  Maddy Ortiz Date: 2025         Service Type: Individual     Visit Start Time: :06  Visit End Time: :56    Visit #:  2    Attendees: Client    Service Modality:  Video Visit:      Provider verified identity through the following two step process.  Patient provided:  Patient  and Patient address    Telemedicine Visit: The patient's condition can be safely assessed and treated via synchronous audio and visual telemedicine encounter.      Reason for Telemedicine Visit: Patient has requested telehealth visit    Originating Site (Patient Location): Patient's home    Distant Site (Provider Location): Provider Remote Setting- Home Office    Consent:  The patient/guardian has verbally consented to: the potential risks and benefits of telemedicine (video visit) versus in person care; bill my insurance or make self-payment for services provided; and responsibility for payment of non-covered services.     Patient would like the video invitation sent by:  My Chart    Mode of Communication:  Video Conference via Amwell    Distant Location (Provider):  Off-site    As the provider I attest to compliance with applicable laws and regulations related to telemedicine.       DATA:   Interactive Complexity: No   Crisis: No     Presenting Concerns/  Current Stressors:   Patient and provider met for an individual intake for therapy today. Patient agreed to meet to continue the diagnostic assessment on  at 10am.      ASSESSMENT:  Mental Status Assessment:  Appearance:   Appropriate   Eye Contact:   Poor  Psychomotor Behavior: Restless   Attitude:   Cooperative  Pleasant Attentive  Orientation:   All  Speech   Rate / Production: Emotional   Volume:  Soft   Mood:    Anxious   Affect:    Worrisome   Thought Content:  Clear  Referential Thinking   Thought Form:  Tangential   Insight:    Good      Assessments completed prior to this visit:  The following assessments were completed by patient for this visit:  PHQ9:       4/9/2024    11:32 AM 7/22/2024     8:47 AM 8/2/2024    11:01 AM 9/24/2024    10:16 AM 4/7/2025     3:25 PM 6/5/2025    12:16 PM 6/9/2025     7:50 AM   PHQ-9 SCORE   PHQ-9 Total Score MyChart 9 (Mild depression)  9 (Mild depression) 5 (Mild depression) 16 (Moderately severe depression)  9 (Mild depression)   PHQ-9 Total Score 9 5 9 5 16  7 9        Patient-reported     GAD7:       5/8/2023    10:01 AM 6/14/2023    11:36 AM 7/19/2023    12:44 PM 11/15/2024    10:57 AM 1/24/2025     8:00 AM 6/5/2025    12:16 PM 6/12/2025    12:16 PM   AYAKA-7 SCORE   Total Score 20 (severe anxiety) 21 (severe anxiety) 21 (severe anxiety) 11 (moderate anxiety) 19 (severe anxiety)  15 (severe anxiety)   Total Score 20 21 21 11  19  10 15        Patient-reported     CAGE-AID:       9/7/2022     8:38 PM 1/30/2023    12:30 PM   CAGE-AID Total Score   Total Score     Total Score MyChart         Information is confidential and restricted. Go to Review Flowsheets to unlock data.     PROMIS 10-Global Health (all questions and answers displayed):       9/7/2022     8:38 PM 1/13/2023    10:48 AM 1/29/2023     2:00 AM 2/2/2023    11:32 AM 6/12/2024     1:16 PM 10/9/2024     1:36 PM 5/5/2025    10:17 AM   PROMIS 10   In general, would you say your health is:    Fair Fair Poor Poor   In general, would you say your quality of life is:    Poor Poor Very good Poor   In general, how would you rate your physical health?    Poor Poor Poor Poor   In general, how would you rate your mental health, including your mood and your ability to think?    Poor Poor Very good Fair   In general, how would you rate your satisfaction with your social activities and relationships?    Poor Poor Poor Good   In general, please rate how well you carry out your usual social activities and roles    Poor Fair Poor Fair   To what extent are you able to  carry out your everyday physical activities such as walking, climbing stairs, carrying groceries, or moving a chair?    Mostly A little Completely A little   In the past 7 days, how often have you been bothered by emotional problems such as feeling anxious, depressed, or irritable?    Always Often Rarely Often   In the past 7 days, how would you rate your fatigue on average?    Moderate Severe None Moderate   In the past 7 days, how would you rate your pain on average, where 0 means no pain, and 10 means worst imaginable pain?    5 8 0 7   In general, would you say your health is:    2 2 1  1   In general, would you say your quality of life is:    1 1 4  1   In general, how would you rate your physical health?    1 1 1  1   In general, how would you rate your mental health, including your mood and your ability to think?    1 1 4  2   In general, how would you rate your satisfaction with your social activities and relationships?    1 1 1  3   In general, please rate how well you carry out your usual social activities and roles. (This includes activities at home, at work and in your community, and responsibilities as a parent, child, spouse, employee, friend, etc.)    1 2 1  2   To what extent are you able to carry out your everyday physical activities such as walking, climbing stairs, carrying groceries, or moving a chair?    4 2 5  2   In the past 7 days, how often have you been bothered by emotional problems such as feeling anxious, depressed, or irritable?    5 4 2  4   In the past 7 days, how would you rate your fatigue on average?    3 4 1  3   In the past 7 days, how would you rate your pain on average, where 0 means no pain, and 10 means worst imaginable pain?    5 8 0  7   Global Mental Health Score    4 5 13 8    Global Physical Health Score    11 7 16 8    PROMIS TOTAL - SUBSCORES    15 12 29 16        Information is confidential and restricted. Go to Review Flowsheets to unlock data.    Patient-reported     "Proxy-reported     [place] Suicide Severity Rating Scale (Lifetime/Recent)      6/16/2023    10:00 PM 7/30/2023     7:45 PM 7/30/2023     8:57 PM 7/30/2023     8:58 PM 6/8/2024     4:01 PM 7/11/2024     6:34 AM 6/9/2025     8:26 AM   [place] Suicide Severity Rating (Lifetime/Recent)   Q1 Wish to be Dead (Lifetime)   Yes       Q2 Non-Specific Active Suicidal Thoughts (Lifetime)   Yes       Q1 Wished to be Dead (Past Month)  no   no  0-->no 0-->no    Q2 Suicidal Thoughts (Past Month)  no   no  0-->no 0-->no    Q4 Suicidal Intent without Specific Plan    no       Q5 Suicide Intent with Specific Plan    no       Q6 Suicide Behavior (Lifetime)   no   1-->yes 0-->no    If yes to Q6, within past 3 months?     0-->no      Level of Risk per Screen    low risk  moderate risk no risks indicated    1. Wish to be Dead (Lifetime) Y      Y   Wish to be Dead Description (Lifetime)       Wish to be dead in teens and in 2016- 2018. Reports she was hospitalized \"quite a few times.\" Usually during conflict with others.   1. Wish to be Dead (Past 1 Month) N      N   2. Non-Specific Active Suicidal Thoughts (Lifetime) Y      Y   2. Non-Specific Active Suicidal Thoughts (Past 1 Month) N      N   3. Active Suicidal Ideation with any Methods (Not Plan) Without Intent to Act (Lifetime) Y  Y    N   3. Active Suicidal Ideation with any Methods (Not Plan) Without Intent to Act (Past 1 Month) N         4. Active Suicidal Ideation with Some Intent to Act, Without Specific Plan (Lifetime) Y  Y    N   4. Active Suicidal Ideation with Some Intent to Act, Without Specific Plan (Past 1 Month) N         5. Active Suicidal Ideation with Specific Plan and Intent (Lifetime) N  N    N   Most Severe Ideation Rating (Lifetime) 2      5   Most Severe Ideation Rating (Past 1 Month)       --   Frequency (Lifetime) 2      2   Frequency (Past 1 Month)       --   Duration (Lifetime) 2      1   Duration (Past 1 Month)       --   Controllability (Lifetime) 2      " 2   Controllability (Past 1 Month)       0   Deterrents (Lifetime) 2      1   Deterrents (Past 1 Month)       0   Reasons for Ideation (Lifetime) 4      1   Reasons for Ideation (Past 1 Month)       0   Actual Attempt (Lifetime) N  N    N   Actual Attempt (Past 3 Months)    N      Has subject engaged in non-suicidal self-injurious behavior? (Lifetime) N  N    Y   Has subject engaged in non-suicidal self-injurious behavior? (Past 3 Months)    N   N   Interrupted Attempts (Lifetime) N  N    Y   Total Number of Interrupted Attempts (Lifetime)       1   Interrupted Attempt Description (Lifetime)       Parents brought me to the hospital   Interrupted Attempts (Past 3 Months)    N   N   Aborted or Self-Interrupted Attempt (Lifetime) N  N    N   Aborted or Self-Interrupted Attempt (Past 3 Months)    N      Preparatory Acts or Behavior (Lifetime) N  N    N   Preparatory Acts or Behavior (Past 3 Months)    N      Calculated C-SSRS Risk Score (Lifetime/Recent) Moderate Risk  Moderate Risk No Risk Indicated   Moderate Risk       Data saved with a previous flowsheet row definition         Safety Issues and Plan for Safety and Risk Management:     Patient denies current fears or concerns for personal safety.  Patient denies current or recent suicidal ideation or behaviors.  Patient denies current or recent homicidal ideation or behaviors.  Patient denies current or recent self injurious behavior or ideation.  Patient denies other safety concerns.  A safety and risk management plan has been developed including: Patient consented to co-developed safety plan.  Safety and risk management plan was completed - see below.  Patient agreed to use safety plan should any safety concerns arise.  A copy was given to the patient.  Patient reports there are no firearms in the house.     Diagnostic Criteria:  Generalized Anxiety Disorder  A. Excessive anxiety and worry about a number of events or activities (such as work or school performance).  "  B. The person finds it difficult to control the worry.  C. Select 3 or more symptoms (required for diagnosis). Only one item is required in children.   - Restlessness or feeling keyed up or on edge.    - Difficulty concentrating or mind going blank.    - Irritability.    - Sleep disturbance (difficulty falling or staying asleep, or restless unsatisfying sleep).   D. The focus of the anxiety and worry is not confined to features of an Axis I disorder.  E. The anxiety, worry, or physical symptoms cause clinically significant distress or impairment in social, occupational, or other important areas of functioning.   F. The disturbance is not due to the direct physiological effects of a substance (e.g., a drug of abuse, a medication) or a general medical condition (e.g., hyperthyroidism) and does not occur exclusively during a Mood Disorder, a Psychotic Disorder, or a Pervasive Developmental Disorder.    - The aformentioned symptoms began in adolescence and occurs 7 days per week and is experienced as moderate.      DSM5 Diagnoses: (Sustained by DSM5 Criteria Listed Above)  Diagnoses: 300.02 (F41.1) Generalized Anxiety Disorder  Psychosocial & Contextual Factors: Parenting concerns, interpersonal difficulties, and persistent mental health concerns.  Intervention:   Completed through review of safety issues and safety interventions and Completed Safety plan  Collateral Reports Completed:  Not Applicable      PLAN: (Homework, other):  1. Provider will continue Diagnostic Assessment.  Patient was given the following to do until next session:  Practice using safety plan.    2. Provider recommended the following referrals: None at this time..      3.  Suicide Risk and Safety Concerns were assessed for Maddy Ortiz.    Patient meets the following risk assessment and triage:   [facility]      Creation Date: 6/8/24 Last Update Date: 6/9/25      Step 1: Warning signs:    Warning Signs    \"Excessive worry\"    " "Feeling like something is wrong    Overthinking about my daugther    Emotional pain    Wanting to avoid people      Step 2: Internal coping strategies - Things I can do to take my mind off my problems without contacting another person:    Strategies    Avoid caffine    Exercise    Go out in nature    Exercise video    Take a short walk      Step 3: People and social settings that provide distraction:    Name Contact Information    Family     Dad        Places    My apartment    Go out to eat with my family    Go outside on the apartment property      Step 4: People whom I can ask for help during a crisis:    Name Contact Information    Dad     , Shira, [phone].       Step 5: Professionals or agencies I can contact during a crisis:    Clinician/Agency Name Phone Emergency Contact    [facility] crisis [phone]       Local Emergency Department Emergency Department Address Emergency Department Phone    [facility] [address] [place] [address] [phone]      Suicide Prevention Lifeline Phone: Call or Text 645  Crisis Text Line: Text HOME to 767135     Step 6: Making the environment safer (plan for lethal means safety):   Did not identify any lethal methods     Optional: What is most important to me and worth living for?:   Therapy and support, including \"my family, all of my workers, medical care, my daughter and boyfriend.\"       [place] Safety Plan. Sonam Lester and Oziel Estrada. Used with permission of the authors.            Mary Baum MA [facility]  2025                        [facility]     PATIENT'S NAME: Maddy Ortiz  PREFERRED NAME: Maddy  PRONOUNS:    MRN: [id number]  : 1985  ADDRESS: [address] [address]  ACCT. NUMBER:  [id number]  DATE OF SERVICE: 25  START TIME: ***  END TIME: ***  PREFERRED PHONE: [phone]  May we leave a program related message: " "Yes  EMERGENCY CONTACT: was obtained for Shira, [phone].  SERVICE MODALITY:  Video Visit:      Provider verified identity through the following two step process.  Patient provided:  Patient  and Patient address    Telemedicine Visit: The patient's condition can be safely assessed and treated via synchronous audio and visual telemedicine encounter.      Reason for Telemedicine Visit: {OP BEH Video Visit Reason:723680}    Originating Site (Patient Location): {OP BEH Video Visit Originating Sites:031860}    Distant Site (Provider Location): {OP BEH Video Visit Distant Site:652796::\"[facility] [place]\"}    Consent:  The patient/guardian has verbally consented to: the potential risks and benefits of telemedicine (video visit) versus in person care; bill my insurance or make self-payment for services provided; and responsibility for payment of non-covered services.     Patient would like the video invitation sent by:  {OP BEH Video invitation:707717}    Mode of Communication:  Video Conference via {OP BEH VIDEO PLATFORM:772726}    Distant Location (Provider):  {virtual location provider:432580}    As the provider I attest to compliance with applicable laws and regulations related to telemedicine.    UNIVERSAL ADULT Mental Health DIAGNOSTIC ASSESSMENT    Identifying Information:  Patient is a 39 year old, {OP BEH RACES:603139} Irish individual.  Patient was referred for an assessment by {OP BEH REFERRED BY:518651}self.  Patient attended the session alone.      Chief Complaint: Patient is wanting to do \"day treatment\" Patient has individual therapy at [place]. \"I had Rafael,\" for anger management at [facility], couples therapy, COLLE[facility] roldan, home nurse, named Callie. \"I wasn't able to stay in [facility] for very long.\" Patient lives near [facility].     Patient has attempted to resolve these concerns in the past through a variety of " "therapies and resources.  The reason for seeking services at this time is: \"mental health\".  The problem(s) began 03/01/11.    Patient {RESOLVE:342345}.    Social/Family History:  Patient reported they grew up in [facility] [facility]  .  They were raised by adopted parents who are white.  Parents were always together.  Patient reported that their childhood was \"a little bit difficult due to my mental illness and I felt like I was different due to my appearance.\"  Patient described their current relationships with family of origin as \"my mom passed away and my dad I don't see him often but its a good relationship.\" \"Mom passed away 5 years ago.\" Patient has 3 older brothers and one younger sister. \"I think they might get annoyed with me so I try to stop writing to them.\"     The patient describes their cultural background as [place]ian.  Cultural influences and impact on patient's life structure, values, norms, and healthcare: adopted, Tenriism.  Contextual influences on patient's health include: Individual Factors ***, Community Factors ***, and Economic Factors ***.    These factors will be addressed in the Preliminary Treatment plan. Patient identified their preferred language to be English. Patient reported they {does:225862::\"does not\"} need the assistance of an  or other support involved in therapy.     Patient reported experienced significant delays in developmental tasks, such as struggling with math, and difficulty with attention. Patient's highest education level was associate degree / vocational certificate .  \"School was very difficult because I felt different. It was not easy for me.\" Patient identified the following learning problems: attention.  Modifications {will/will not:568585::\"will\"} be used to assist communication in therapy. *** Patient reports they are  able to understand written materials. Patient reports difficulty with feeling engaged in activities.    Patient reported the following " "relationship history ***.  Patient's current relationship status is has a partner or significant other for ***.   Patient identified their sexual orientation as asexual.  Patient reported having 1 child(cristy). Patient identified partner; parents; father; siblings; adult child; pets; therapist;  as part of their support system.  Patient identified the quality of these relationships as stable and meaningful,  .      Patient's current living/housing situation involves staying in own home/apartment.  The immediate members of family and household include Ludy Onofre,daughter  and they report that housing is stable.    Patient is currently disabled.  Patient reports their finances are obtained through disability; SSDI disability; [employer] assistance. Patient does identify finances as a current stressor.      Patient reported that they have been involved with the legal system.  History of being involved in custody and parenting time . Patient does not report being under probation/ parole/ jurisdiction. They are not under any current court jurisdiction.     Patient reports being unable to drive due to \"not being able to drive under pressure.\"    Patient's Strengths and Limitations:  Patient identified the following strengths or resources that will help them succeed in treatment: Willing to learn, learning how to be more cooperative. . \"I like to go out biking and play games on the computer.\" Patient enjoys walking, she talks to her sister, plays card games on pogo, watches baseball, hockey, and \"I try not to spend money on second life.\" Things that may interfere with the patient's success in treatment include: lack of social support, \"therapy has felt like school for me. Group therapy is more difficult for me. Individual therapy is easier for me.\" \"I've been working on avoiding talking about what's fair and getting angry.\"    Assessments:  The following assessments were completed by patient for this visit:  PHQ9:      "  4/9/2024    11:32 AM 7/22/2024     8:47 AM 8/2/2024    11:01 AM 9/24/2024    10:16 AM 4/7/2025     3:25 PM 6/5/2025    12:16 PM 6/9/2025     7:50 AM   PHQ-9 SCORE   PHQ-9 Total Score MyChart 9 (Mild depression)  9 (Mild depression) 5 (Mild depression) 16 (Moderately severe depression)  9 (Mild depression)   PHQ-9 Total Score 9 5 9 5 16  7 9        Patient-reported     GAD7:       5/8/2023    10:01 AM 6/14/2023    11:36 AM 7/19/2023    12:44 PM 11/15/2024    10:57 AM 1/24/2025     8:00 AM 6/5/2025    12:16 PM 6/12/2025    12:16 PM   AYAKA-7 SCORE   Total Score 20 (severe anxiety) 21 (severe anxiety) 21 (severe anxiety) 11 (moderate anxiety) 19 (severe anxiety)  15 (severe anxiety)   Total Score 20 21 21 11  19  10 15        Patient-reported     CAGE-AID:       9/7/2022     8:38 PM 1/30/2023    12:30 PM   CAGE-AID Total Score   Total Score     Total Score MyChart         Information is confidential and restricted. Go to Review Flowsheets to unlock data.     PROMIS 10-Global Health (all questions and answers displayed):       9/7/2022     8:38 PM 1/13/2023    10:48 AM 1/29/2023     2:00 AM 2/2/2023    11:32 AM 6/12/2024     1:16 PM 10/9/2024     1:36 PM 5/5/2025    10:17 AM   PROMIS 10   In general, would you say your health is:    Fair Fair Poor Poor   In general, would you say your quality of life is:    Poor Poor Very good Poor   In general, how would you rate your physical health?    Poor Poor Poor Poor   In general, how would you rate your mental health, including your mood and your ability to think?    Poor Poor Very good Fair   In general, how would you rate your satisfaction with your social activities and relationships?    Poor Poor Poor Good   In general, please rate how well you carry out your usual social activities and roles    Poor Fair Poor Fair   To what extent are you able to carry out your everyday physical activities such as walking, climbing stairs, carrying groceries, or moving a chair?    Mostly A  little Completely A little   In the past 7 days, how often have you been bothered by emotional problems such as feeling anxious, depressed, or irritable?    Always Often Rarely Often   In the past 7 days, how would you rate your fatigue on average?    Moderate Severe None Moderate   In the past 7 days, how would you rate your pain on average, where 0 means no pain, and 10 means worst imaginable pain?    5 8 0 7   In general, would you say your health is:    2 2 1  1   In general, would you say your quality of life is:    1 1 4  1   In general, how would you rate your physical health?    1 1 1  1   In general, how would you rate your mental health, including your mood and your ability to think?    1 1 4  2   In general, how would you rate your satisfaction with your social activities and relationships?    1 1 1  3   In general, please rate how well you carry out your usual social activities and roles. (This includes activities at home, at work and in your community, and responsibilities as a parent, child, spouse, employee, friend, etc.)    1 2 1  2   To what extent are you able to carry out your everyday physical activities such as walking, climbing stairs, carrying groceries, or moving a chair?    4 2 5  2   In the past 7 days, how often have you been bothered by emotional problems such as feeling anxious, depressed, or irritable?    5 4 2  4   In the past 7 days, how would you rate your fatigue on average?    3 4 1  3   In the past 7 days, how would you rate your pain on average, where 0 means no pain, and 10 means worst imaginable pain?    5 8 0  7   Global Mental Health Score    4 5 13 8    Global Physical Health Score    11 7 16 8    PROMIS TOTAL - SUBSCORES    15 12 29 16        Information is confidential and restricted. Go to Review Flowsheets to unlock data.    Patient-reported    Proxy-reported     [place] Suicide Severity Rating Scale (Lifetime/Recent)      6/16/2023    10:00 PM 7/30/2023     7:45 PM  "7/30/2023     8:57 PM 7/30/2023     8:58 PM 6/8/2024     4:01 PM 7/11/2024     6:34 AM 6/9/2025     8:26 AM   [place] Suicide Severity Rating (Lifetime/Recent)   Q1 Wish to be Dead (Lifetime)   Yes       Q2 Non-Specific Active Suicidal Thoughts (Lifetime)   Yes       Q1 Wished to be Dead (Past Month)  no   no  0-->no 0-->no    Q2 Suicidal Thoughts (Past Month)  no   no  0-->no 0-->no    Q4 Suicidal Intent without Specific Plan    no       Q5 Suicide Intent with Specific Plan    no       Q6 Suicide Behavior (Lifetime)   no   1-->yes 0-->no    If yes to Q6, within past 3 months?     0-->no      Level of Risk per Screen    low risk  moderate risk no risks indicated    1. Wish to be Dead (Lifetime) Y      Y   Wish to be Dead Description (Lifetime)       Wish to be dead in teens and in 2016- 2018. Reports she was hospitalized \"quite a few times.\" Usually during conflict with others.   1. Wish to be Dead (Past 1 Month) N      N   2. Non-Specific Active Suicidal Thoughts (Lifetime) Y      Y   2. Non-Specific Active Suicidal Thoughts (Past 1 Month) N      N   3. Active Suicidal Ideation with any Methods (Not Plan) Without Intent to Act (Lifetime) Y  Y    N   3. Active Suicidal Ideation with any Methods (Not Plan) Without Intent to Act (Past 1 Month) N         4. Active Suicidal Ideation with Some Intent to Act, Without Specific Plan (Lifetime) Y  Y    N   4. Active Suicidal Ideation with Some Intent to Act, Without Specific Plan (Past 1 Month) N         5. Active Suicidal Ideation with Specific Plan and Intent (Lifetime) N  N    N   Most Severe Ideation Rating (Lifetime) 2      5   Most Severe Ideation Rating (Past 1 Month)       --   Frequency (Lifetime) 2      2   Frequency (Past 1 Month)       --   Duration (Lifetime) 2      1   Duration (Past 1 Month)       --   Controllability (Lifetime) 2      2   Controllability (Past 1 Month)       0   Deterrents (Lifetime) 2      1   Deterrents (Past 1 Month)       0   Reasons for " "Ideation (Lifetime) 4      1   Reasons for Ideation (Past 1 Month)       0   Actual Attempt (Lifetime) N  N    N   Actual Attempt (Past 3 Months)    N      Has subject engaged in non-suicidal self-injurious behavior? (Lifetime) N  N    Y   Has subject engaged in non-suicidal self-injurious behavior? (Past 3 Months)    N   N   Interrupted Attempts (Lifetime) N  N    Y   Total Number of Interrupted Attempts (Lifetime)       1   Interrupted Attempt Description (Lifetime)       Parents brought me to the hospital   Interrupted Attempts (Past 3 Months)    N   N   Aborted or Self-Interrupted Attempt (Lifetime) N  N    N   Aborted or Self-Interrupted Attempt (Past 3 Months)    N      Preparatory Acts or Behavior (Lifetime) N  N    N   Preparatory Acts or Behavior (Past 3 Months)    N      Calculated C-SSRS Risk Score (Lifetime/Recent) Moderate Risk  Moderate Risk No Risk Indicated   Moderate Risk       Data saved with a previous flowsheet row definition       Personal and Family Medical History:  Patient does report a family history of mental health concerns.  Patient reports family history includes Hypertension in her sister; Unknown/Adopted in her father, mother, and another family member. She was adopted.     Patient does report Mental Health Diagnosis and/or Treatment.  Patient reported the following previous diagnoses which includes: an anxiety disorder; a bipolar disorder; depression; schizophrenia .  In terms of what the patient understands in terms her diagnosis: \"Depression and I was tested for ADHD when I was very young.\" Patient reported symptoms began ***.  Patient has received mental health services in the past:  [facility]; case management; therapy; day treatment; psychiatry.  Psychiatric Hospitalizations: [facility]; [facility]; [facility] when  \"many times in 2011, 2012, 2013, 2014, [id number], 2020,  2009, [phone].\" \"I was hospitalized in my teens.\"     Patient {OP BEH CIVIL " COMMITTMENT:950000}.      Currently, patient reports receiving [facility]; case management; psychotherapy  as mental health services.        Patient has had a physical exam to rule out medical causes for current symptoms.  Date of last physical exam was {DATE OF LAST PHYSICAL EXAM:125813}. The patient {PCP:763870}.  Patient reports {Medical Concerns:888032}.  Patient {OP BEH PAIN FOR DA:049775}.   There {ARE:636239} significant appetite / nutritional concerns / weight changes.   Patient {does/does not:200015} report a history of head injury / trauma / cognitive impairment.  ***    Patient reports current meds as:   Current Outpatient Medications   Medication Sig Dispense Refill    ACCU-CHEK GUIDE test strip USE TO TEST BLOOD SUGAR 6 TIMES DAILY OR AS DIRECTED 600 strip 3    acetaminophen (TYLENOL) 325 MG tablet Take 2 tablets (650 mg) by mouth every 4 hours as needed for mild pain (to moderate pain)      albuterol (PROAIR HFA/PROVENTIL HFA/VENTOLIN HFA) 108 (90 Base) MCG/ACT inhaler Inhale 2 puffs into the lungs every 4 hours as needed for wheezing or shortness of breath 18 g 1    ammonium lactate (LAC-HYDRIN) 12 % external cream Apply topically 2 times daily as needed for dry skin 385 g 3    azelastine (ASTELIN) 0.1 % nasal spray Spray 2 sprays into both nostrils 2 times daily as needed for rhinitis 90 mL 1    azelastine (OPTIVAR) 0.05 % ophthalmic solution Apply 1 drop to eye 2 times daily as needed (itchy/watery/red eyes) 6 mL 11    blood glucose (ONETOUCH VERIO IQ) test strip Use to test blood sugar 2 times daily or as directed. 100 strip 11    Blood Glucose Monitoring Suppl (ONETOUCH VERIO FLEX SYSTEM) w/Device KIT 1 each as needed 1 kit 0    desvenlafaxine (PRISTIQ) 50 MG 24 hr tablet       desvenlafaxine succinate (PRISTIQ) 25 MG 24 hr tablet       fexofenadine (ALLEGRA) 180 MG tablet Take 180 mg by mouth daily.      fluticasone (FLONASE) 50 MCG/ACT nasal spray Spray 2 sprays into both nostrils daily 15.8 mL 11     ibuprofen (ADVIL/MOTRIN) 200 MG tablet Take 200 mg by mouth every 4 hours as needed for pain.      insulin pen needle (BD ROZ U/F) 32G X 4 MM miscellaneous Use 1 pen needles daily for the Victoza 100 each 11    lamoTRIgine (LAMICTAL) 200 MG tablet Take 200 mg by mouth at bedtime. Also 25mg in the AM and 25mg at llunch time.      levocetirizine (XYZAL) 5 MG tablet Take 1 tablet (5 mg) by mouth every evening 90 tablet 1    levonorgestrel (MIRENA) 20 MCG/DAY IUD 1 each (20 mcg) by Intrauterine route once      levothyroxine (SYNTHROID/LEVOTHROID) 50 MCG tablet Take 1 tablet (50 mcg) by mouth daily. 90 tablet 3    liraglutide (VICTOZA) 18 MG/3ML solution Inject 1.8 mg subcutaneously daily. 27 mL 1    lithium ER (LITHOBID) 300 MG CR tablet Take 300 mg by mouth daily. Total of 900 mg daily      montelukast (SINGULAIR) 10 MG tablet Take 10 mg by mouth at bedtime.      naltrexone (DEPADE/REVIA) 50 MG tablet Take 0.5 tablets (25 mg) by mouth daily at 2 pm. 45 tablet 1    risperiDONE (RISPERDAL M-TABS) 3 MG ODT       risperiDONE (RISPERDAL) 1 MG tablet Take 3 mg by mouth at bedtime.      rosuvastatin (CRESTOR) 20 MG tablet Take 1 tablet (20 mg) by mouth daily. 90 tablet 3    spacer (OPTICHAMBER APOLINAR) holding chamber Use with Symbicort and albuterol inhalers as prescribed 1 each 0     No current facility-administered medications for this visit.       Medication Adherence:  Patient reports taking psychiatric medications as prescribed.    Patient Allergies:    Allergies   Allergen Reactions    Dust Mites Shortness Of Breath    Animal Dander      Other reaction(s): *Unknown    Mold      Other reaction(s): Runny Nose    Trees        Medical History:    Past Medical History:   Diagnosis Date    Bipolar 1 disorder (H) 12/6/2012    Depressive disorder     Diabetes mellitus, type 2 (H) 12/13/2021    Paranoid type delusional disorder (H) 11/14/2012         Current Mental Status Exam:   Appearance:  Appropriate    Eye  Contact:  Poor  Psychomotor:  Restless       Gait / station:  not observed  Attitude / Demeanor: {Attitude:538979}  Speech      Rate / Production: {Rate/Production:487762}      Volume:  {Volume:746122} volume      Language:  {Language:066423}  Mood:   {Mood:384343}  Affect:   {Affect:399891}   Thought Content: {Thought Content:141501}  Thought Process: {Thought Form:426070}      Associations: {Associations:947661}  Insight:   {Insight:842999}  Judgment:  {Judgment:292411}  Orientation:  {Orientation:291458}  Attention/concentration: {Attention:315179}    Substance Use:   {OP BEH ADULT SUBSTANCE USE:135814}    Significant Losses / Trauma / Abuse / Neglect Issues:   Patient did not  serve in the .  There {ARE/ARE NOT:884243} indications or report of significant loss, trauma, abuse or neglect issues related to: {Types of Loss:465762}.  Patient {HAS HAS NOT:163039} been a victim of exploitation.  Concerns for possible neglect {Neglect:894119}. ***    Safety Assessment:   Patient {OP BEH HOMICIDAL IDEATION:285889}  Patient {OP BEH SI FOR DA:074545}  Patient {OP BEH SIB FOR DA:602617}  Patient {OP BEH ALEIDA RISK BEHAVIORS:677283} associated with substance use.  Patient {OP BEH MH RISK BEHAVIORS:823464} associated with mental health symptoms.  Patient {OP BEH PERSONAL SAFETY CONCERNS:630569}  Patient {OP BEH ASSAULT FOR DA:441915}  Patient {OP BEH SEXUAL OFFENSE HISTORY:120463}   Patient reports there {ARE/ARE NOT:596939} are not,   firearms in the house.    Patient reports the following protective factors:  forward or future oriented thinking; dedication to family or friends; safe and stable environment; regular sleep; effectively controls impulses; regular physical activity; sense of belonging; purpose; secure attachment; help seeking behaviors when distressed; abstinence from substances; adherence with prescribed medication; agreement to use safety plan; living with other people; daily obligations; structured day;  uses community crisis resources; effective problem solving skills; commitment to well being; sense of meaning; positive social skills; healthy fear of risky behaviors or pain; strong sense of self worth or esteem; sense of personal control or determination; access to a variety of clinical interventions and pets; other    Risk Plan:  See Recommendations for Safety and Risk Management Plan    Review of Symptoms per patient report:   Depression: {OP BEH SYMPTOMS DEPRESSION:302921} Depression started   Mick:  {OP BEH SYMPTOMS MICK:203356}  Psychosis: {OP BEH SYMPTOMS PSYCHOSIS:772694}  Anxiety: {OP BEH SYMPTOMS ANXIETY:158887}  Panic:  {OP BEH FCC SYMPTOMS PANIC:902631}  Post Traumatic Stress Disorder:  {OP BEH SYMPTOMS PTSD:666081}   Eating Disorder: {OP BEH SYMPTOMS EATING D/O:013500}  ADD / ADHD:  {OP BEH SYMPTOMS ADHD:819980}  Conduct Disorder: {OP BEH SYMPTOMS CONDUCT DISORDER:364662}  Autism Spectrum Disorder: {OP BEH AUTISM SPECTRUM:980955}  Obsessive Compulsive Disorder: {OP BEH SYMPTOMS OCD:420719}  Personality Disorders:  {PERSONALITY DISORDERS:673473}    Patient reports the following compulsive behaviors and treatment history: {OP BEH COMPULSIVE BEHAVIORS:761278}.      Diagnostic Criteria:   {OP BEH DSM 5 Criteria:174723}    Functional Status:  Patient reports the following functional impairments:  {SELF-REPORTED FUNCTIONAL IMPAIRMENTS:588272}.     {OP BEH LOCUS:847139}    Clinical Summary:  1. Psychosocial Factors:  {PSYCHOSOCIAL FACTORS:314976}.  Cultural and Contextual Factors: ***  2. Principal DSM5 Diagnoses  (Sustained by DSM5 Criteria Listed Above):   {DSM5 MH Diagnosis:275864}.  3. Other Diagnoses that is relevant to services:   {DSM5 MH Diagnosis:787776}.  4. Provisional Diagnosis:  {DSM5 MH Diagnosis:338869} as evidenced by *** .  5. Prognosis: {Expected Outcomes / Prognosis:079165536}.  6. Likely consequences of symptoms if not treated: ***.  7. Patient strengths include:  {Client  "Strengths:9082432} .     Recommendations:     1. Plan for Safety and Risk Management:   Safety and Risk: A safety and risk management plan has been developed including: Patient consented to co-developed safety plan.  Safety and risk management plan was completed - see below.  Patient agreed to use safety plan should any safety concerns arise.  A copy was given to the patient.        Report to child / adult protection services was {Completed:465383}.     2. Patient's identified mental health concerns with a cultural influence will be addressed by ***.     [facility] Psychotherapy [email or web address]     3. Initial Treatment will focus on:    {Preliminary Focus:537381}.     4. Resources/Service Plan:    services are not indicated.   Modifications to assist communication are not indicated.   Additional disability accommodations {OP BEH ACCOMODATIONS:950862}.      5. Collaboration:   Collaboration / coordination of treatment will be initiated with the following  support professionals: {OP BEH COLLABORATION / REFERRAL:765161}.      6.  Referrals:   The following referral(s) will be initiated: [facility] Psych[facility].       A Release of Information has been obtained for the following: {OP BEH [facility] COLLABORATION / REFERRAL:978049}.     Clinical Substantiation/medical necessity for the above recommendations:  ***.    7. ALEIDA:    ALEIDA:  {Alcohol:416362::\"Discussed the general effects of drugs and alcohol on health and well-being\"}. Provider gave patient printed information about the  effects of chemical use on their health and well being. Recommendations:  *** .     8. Records:   These were reviewed at time of assessment.   Information in this assessment was obtained from the medical record and  provided by patient who is a fair historian.    {Access to Records:995555}.    9.   Interactive Complexity: No    10. Safety Plan: {Safety " Plan:510164}    Provider Name/ Credentials:  Mary Baum MA [facility]  June 12, 2025

## 2025-06-13 ENCOUNTER — MYC MEDICAL ADVICE (OUTPATIENT)
Dept: FAMILY MEDICINE | Facility: CLINIC | Age: 40
End: 2025-06-13
Payer: COMMERCIAL

## 2025-06-13 NOTE — TELEPHONE ENCOUNTER
See My chart message, not clear if this message is for PCP? Or Pysch provider?    Looks like patient was seen recently for severe anxiety by Dr. Fernandez on 6-5-25.    Rosaura Adams RN

## 2025-06-15 ENCOUNTER — MYC MEDICAL ADVICE (OUTPATIENT)
Dept: FAMILY MEDICINE | Facility: CLINIC | Age: 40
End: 2025-06-15
Payer: COMMERCIAL

## 2025-06-16 NOTE — TELEPHONE ENCOUNTER
I am very familiar with this patient.  She is already followed by psych.  This message is not about her health or medical needs   We can close this encounter.  Shirley Freeman, CNP

## 2025-06-18 ENCOUNTER — DOCUMENTATION ONLY (OUTPATIENT)
Dept: BEHAVIORAL HEALTH | Facility: CLINIC | Age: 40
End: 2025-06-18
Payer: COMMERCIAL

## 2025-06-18 NOTE — PROGRESS NOTES
Provider joined [employer] at 10:02 and patient did not join. Provider reached out to the patient via text message and email 10:10AM and patient did not join. Patient can reschedule their missed appointment by calling behavior access at [phone].

## 2025-06-21 ENCOUNTER — MYC MEDICAL ADVICE (OUTPATIENT)
Dept: FAMILY MEDICINE | Facility: CLINIC | Age: 40
End: 2025-06-21
Payer: COMMERCIAL

## 2025-06-21 DIAGNOSIS — F22 PARANOID TYPE DELUSIONAL DISORDER (H): Primary | ICD-10-CM

## 2025-06-27 ENCOUNTER — MYC MEDICAL ADVICE (OUTPATIENT)
Dept: FAMILY MEDICINE | Facility: CLINIC | Age: 40
End: 2025-06-27
Payer: COMMERCIAL

## 2025-06-27 NOTE — TELEPHONE ENCOUNTER
I am not sure what patient questions are I am not her primary doctor.  Please have RN talk to her and see what information she is looking for I could not get the answers from her long note.  If it is regarding her underlying social issues she should better go see her primary doctor who she is managing that.

## 2025-06-27 NOTE — TELEPHONE ENCOUNTER
If  Patient has some underlying psych issues she is seeing psychiatrist and she should direct that office to call  and discuss her underlying psych issues unfortunately we cannot make any determination or Talk to her primary doctor discussed these findings.

## 2025-06-30 RX ORDER — MULTIVITAMIN
1 TABLET ORAL DAILY
Qty: 90 TABLET | Refills: 3 | Status: SHIPPED | OUTPATIENT
Start: 2025-06-30

## 2025-06-30 NOTE — TELEPHONE ENCOUNTER
Please advise on sending multi-vitamin for patient?    Clarissa Mckeon RN on 6/30/2025 at 12:01 PM

## 2025-06-30 NOTE — TELEPHONE ENCOUNTER
I sent a prescription over already this morning.  Is there another question?  Shirley Freeman, CNP

## 2025-06-30 NOTE — TELEPHONE ENCOUNTER
Patient Contact    Attempt # 1    Was call answered?  No.  Left message on voicemail with information to call clinic back.    Also replying to pt via [facility] - postponing encounter to allow time.    Nadja Campos RN

## 2025-06-30 NOTE — TELEPHONE ENCOUNTER
Patient states that she does see Joaquim. Her next appointment is 7/8.  Patient agrees also to reach out to her PCP Shirley Freeman. Patient requested appointment with Dr. Tapia cancelled. Radha Henriquez RN

## 2025-07-01 ENCOUNTER — HOSPITAL ENCOUNTER (EMERGENCY)
Facility: CLINIC | Age: 40
Discharge: HOME | End: 2025-07-01
Attending: FAMILY MEDICINE | Admitting: FAMILY MEDICINE
Payer: COMMERCIAL

## 2025-07-01 ENCOUNTER — TELEPHONE (OUTPATIENT)
Dept: BEHAVIORAL HEALTH | Facility: CLINIC | Age: 40
End: 2025-07-01

## 2025-07-01 VITALS
TEMPERATURE: 98.7 F | OXYGEN SATURATION: 96 % | HEIGHT: 63 IN | SYSTOLIC BLOOD PRESSURE: 144 MMHG | HEART RATE: 82 BPM | DIASTOLIC BLOOD PRESSURE: 93 MMHG | RESPIRATION RATE: 26 BRPM | BODY MASS INDEX: 41.29 KG/M2 | WEIGHT: 233 LBS

## 2025-07-01 DIAGNOSIS — F22 PARANOID TYPE DELUSIONAL DISORDER (H): ICD-10-CM

## 2025-07-01 DIAGNOSIS — F25.0 SCHIZOAFFECTIVE DISORDER, BIPOLAR TYPE (H): Chronic | ICD-10-CM

## 2025-07-01 LAB
ALBUMIN SERPL BCG-MCNC: 4.5 G/DL (ref 3.5–5.2)
ALP SERPL-CCNC: 91 U/L (ref 40–150)
ALT SERPL W P-5'-P-CCNC: 61 U/L (ref 0–50)
AMPHETAMINES UR QL SCN: NORMAL
ANION GAP SERPL CALCULATED.3IONS-SCNC: 14 MMOL/L (ref 7–15)
APAP SERPL-MCNC: 6.5 UG/ML (ref 10–30)
AST SERPL W P-5'-P-CCNC: 99 U/L (ref 0–45)
ATRIAL RATE - MUSE: 79 BPM
BARBITURATES UR QL SCN: NORMAL
BASOPHILS # BLD AUTO: 0.1 10E3/UL (ref 0–0.2)
BASOPHILS NFR BLD AUTO: 1 %
BENZODIAZ UR QL SCN: NORMAL
BILIRUB SERPL-MCNC: 0.4 MG/DL
BUN SERPL-MCNC: 13 MG/DL (ref 6–20)
BZE UR QL SCN: NORMAL
CALCIUM SERPL-MCNC: 9.6 MG/DL (ref 8.8–10.4)
CANNABINOIDS UR QL SCN: NORMAL
CHLORIDE SERPL-SCNC: 103 MMOL/L (ref 98–107)
CREAT SERPL-MCNC: 0.72 MG/DL (ref 0.51–0.95)
DIASTOLIC BLOOD PRESSURE - MUSE: NORMAL MMHG
EGFRCR SERPLBLD CKD-EPI 2021: >90 ML/MIN/1.73M2
EOSINOPHIL # BLD AUTO: 0.3 10E3/UL (ref 0–0.7)
EOSINOPHIL NFR BLD AUTO: 3 %
ERYTHROCYTE [DISTWIDTH] IN BLOOD BY AUTOMATED COUNT: 11.7 % (ref 10–15)
ETHANOL SERPL-MCNC: <0.01 G/DL
FENTANYL UR QL: NORMAL
GLUCOSE SERPL-MCNC: 159 MG/DL (ref 70–99)
HCO3 SERPL-SCNC: 20 MMOL/L (ref 22–29)
HCT VFR BLD AUTO: 39.5 % (ref 35–47)
HGB BLD-MCNC: 13.5 G/DL (ref 11.7–15.7)
IMM GRANULOCYTES # BLD: 0 10E3/UL
IMM GRANULOCYTES NFR BLD: 0 %
INTERPRETATION ECG - MUSE: NORMAL
LITHIUM SERPL-SCNC: 0.72 MMOL/L (ref 0.6–1.2)
LYMPHOCYTES # BLD AUTO: 1.6 10E3/UL (ref 0.8–5.3)
LYMPHOCYTES NFR BLD AUTO: 16 %
MCH RBC QN AUTO: 29.5 PG (ref 26.5–33)
MCHC RBC AUTO-ENTMCNC: 34.2 G/DL (ref 31.5–36.5)
MCV RBC AUTO: 86 FL (ref 78–100)
MONOCYTES # BLD AUTO: 0.5 10E3/UL (ref 0–1.3)
MONOCYTES NFR BLD AUTO: 5 %
NEUTROPHILS # BLD AUTO: 7.4 10E3/UL (ref 1.6–8.3)
NEUTROPHILS NFR BLD AUTO: 76 %
NRBC # BLD AUTO: 0 10E3/UL
NRBC BLD AUTO-RTO: 0 /100
OPIATES UR QL SCN: NORMAL
P AXIS - MUSE: 19 DEGREES
PCP QUAL URINE (ROCHE): NORMAL
PLATELET # BLD AUTO: 207 10E3/UL (ref 150–450)
POTASSIUM SERPL-SCNC: 3.7 MMOL/L (ref 3.4–5.3)
PR INTERVAL - MUSE: 196 MS
PROT SERPL-MCNC: 7.6 G/DL (ref 6.4–8.3)
QRS DURATION - MUSE: 90 MS
QT - MUSE: 400 MS
QTC - MUSE: 458 MS
R AXIS - MUSE: 55 DEGREES
RBC # BLD AUTO: 4.58 10E6/UL (ref 3.8–5.2)
SALICYLATES SERPL-MCNC: <0.3 MG/DL (ref ?–30)
SARS-COV-2 RNA RESP QL NAA+PROBE: NEGATIVE
SODIUM SERPL-SCNC: 137 MMOL/L (ref 135–145)
SYSTOLIC BLOOD PRESSURE - MUSE: NORMAL MMHG
T AXIS - MUSE: 45 DEGREES
TSH SERPL DL<=0.005 MIU/L-ACNC: 2.43 UIU/ML (ref 0.3–4.2)
VENTRICULAR RATE- MUSE: 79 BPM
WBC # BLD AUTO: 9.8 10E3/UL (ref 4–11)

## 2025-07-01 PROCEDURE — 80178 ASSAY OF LITHIUM: CPT | Performed by: FAMILY MEDICINE

## 2025-07-01 PROCEDURE — 84443 ASSAY THYROID STIM HORMONE: CPT | Performed by: FAMILY MEDICINE

## 2025-07-01 PROCEDURE — 36415 COLL VENOUS BLD VENIPUNCTURE: CPT | Performed by: FAMILY MEDICINE

## 2025-07-01 PROCEDURE — 80179 DRUG ASSAY SALICYLATE: CPT | Performed by: FAMILY MEDICINE

## 2025-07-01 PROCEDURE — 93005 ELECTROCARDIOGRAM TRACING: CPT | Performed by: FAMILY MEDICINE

## 2025-07-01 PROCEDURE — 250N000013 HC RX MED GY IP 250 OP 250 PS 637: Performed by: FAMILY MEDICINE

## 2025-07-01 PROCEDURE — 80143 DRUG ASSAY ACETAMINOPHEN: CPT | Performed by: FAMILY MEDICINE

## 2025-07-01 PROCEDURE — 85004 AUTOMATED DIFF WBC COUNT: CPT | Performed by: FAMILY MEDICINE

## 2025-07-01 PROCEDURE — 87635 SARS-COV-2 COVID-19 AMP PRB: CPT | Performed by: FAMILY MEDICINE

## 2025-07-01 PROCEDURE — 82077 ASSAY SPEC XCP UR&BREATH IA: CPT | Performed by: FAMILY MEDICINE

## 2025-07-01 PROCEDURE — 80053 COMPREHEN METABOLIC PANEL: CPT | Performed by: FAMILY MEDICINE

## 2025-07-01 PROCEDURE — 99285 EMERGENCY DEPT VISIT HI MDM: CPT | Performed by: FAMILY MEDICINE

## 2025-07-01 PROCEDURE — 80307 DRUG TEST PRSMV CHEM ANLYZR: CPT | Performed by: FAMILY MEDICINE

## 2025-07-01 PROCEDURE — 80175 DRUG SCREEN QUAN LAMOTRIGINE: CPT | Performed by: FAMILY MEDICINE

## 2025-07-01 RX ORDER — OLANZAPINE 5 MG/1
10 TABLET, ORALLY DISINTEGRATING ORAL 2 TIMES DAILY
Status: DISCONTINUED | OUTPATIENT
Start: 2025-07-01 | End: 2025-07-01 | Stop reason: HOSPADM

## 2025-07-01 RX ORDER — RISPERIDONE 0.5 MG/1
1 TABLET ORAL 2 TIMES DAILY PRN
COMMUNITY
Start: 2025-05-29

## 2025-07-01 RX ORDER — NALTREXONE HYDROCHLORIDE 50 MG/1
0.5 TABLET, FILM COATED ORAL AT BEDTIME
COMMUNITY

## 2025-07-01 RX ORDER — RISPERIDONE 0.5 MG/1
1 TABLET, ORALLY DISINTEGRATING ORAL AT BEDTIME
COMMUNITY
Start: 2025-05-29

## 2025-07-01 RX ADMIN — OLANZAPINE 10 MG: 5 TABLET, ORALLY DISINTEGRATING ORAL at 17:52

## 2025-07-01 RX ADMIN — OLANZAPINE 10 MG: 5 TABLET, ORALLY DISINTEGRATING ORAL at 11:22

## 2025-07-01 ASSESSMENT — ACTIVITIES OF DAILY LIVING (ADL)
ADLS_ACUITY_SCORE: 50

## 2025-07-01 ASSESSMENT — COLUMBIA-SUICIDE SEVERITY RATING SCALE - C-SSRS
3. HAVE YOU BEEN THINKING ABOUT HOW YOU MIGHT KILL YOURSELF?: NO
4. HAVE YOU HAD THESE THOUGHTS AND HAD SOME INTENTION OF ACTING ON THEM?: NO
6. HAVE YOU EVER DONE ANYTHING, STARTED TO DO ANYTHING, OR PREPARED TO DO ANYTHING TO END YOUR LIFE?: YES
1. IN THE PAST MONTH, HAVE YOU WISHED YOU WERE DEAD OR WISHED YOU COULD GO TO SLEEP AND NOT WAKE UP?: NO
5. HAVE YOU STARTED TO WORK OUT OR WORKED OUT THE DETAILS OF HOW TO KILL YOURSELF? DO YOU INTEND TO CARRY OUT THIS PLAN?: NO

## 2025-07-01 NOTE — PROGRESS NOTES
Writer spoke with pt's father at . He confirms that pt's daughter is 17 years old and functions independently. He will be checking on her while pt is hospitalized. He will also offer for her to come stay with him in [place] if she would like to do so. This information is consistent with information provided from patient. There are no safety concerns.

## 2025-07-01 NOTE — MEDICATION SCRIBE - ADMISSION MEDICATION HISTORY
Medication Scribe Admission Medication History    Admission medication history is complete. The information provided in this note is only as accurate as the sources available at the time of the update.    Information Source(s): Patient and dispensing pharmacy  via in-person and online    Pertinent Information:     Changes made to PTA medication list:  Added: risperidone 0.5 mg, ODT 1 at HS, riseridone 0.5 mg, 1 tab, bid, prn  Deleted: pristiq 25, ammonium lactate, astelin ns, optivar eye drops, ibuprofen, lamotrigine  Changed: None    Allergies reviewed with patient and updates made in EHR: yes    Medication History Completed By: Estefania Olvera 7/1/2025 6:00 PM    PTA Med List   Medication Sig Note Last Dose/Taking    acetaminophen (TYLENOL) 325 MG tablet Take 2 tablets (650 mg) by mouth every 4 hours as needed for mild pain (to moderate pain)  7/1/2025 Morning    albuterol (PROAIR HFA/PROVENTIL HFA/VENTOLIN HFA) 108 (90 Base) MCG/ACT inhaler Inhale 2 puffs into the lungs every 4 hours as needed for wheezing or shortness of breath  More than a month    desvenlafaxine (PRISTIQ) 50 MG 24 hr tablet 1 tablet daily.  6/30/2025 Bedtime    fexofenadine (ALLEGRA) 180 MG tablet Take 1 tablet by mouth daily.  6/30/2025 Bedtime    fluticasone (FLONASE) 50 MCG/ACT nasal spray Spray 2 sprays into both nostrils daily  More than a month    levocetirizine (XYZAL) 5 MG tablet Take 1 tablet (5 mg) by mouth every evening  6/30/2025 Bedtime    levonorgestrel (MIRENA) 20 MCG/DAY IUD 1 each (20 mcg) by Intrauterine route once  Taking    levothyroxine (SYNTHROID/LEVOTHROID) 50 MCG tablet Take 1 tablet (50 mcg) by mouth daily.  6/30/2025 Morning    liraglutide (VICTOZA) 18 MG/3ML solution Inject 1.8 mg subcutaneously daily.  6/30/2025 Bedtime    lithium ER (LITHOBID) 300 MG CR tablet Take 300 mg by mouth daily. Total of 900 mg daily 7/1/2025: Pt states she takes 3 tabs at the same time. There is no sig. Last filled 5/29/2025, 30 day  supply, #90    6/30/2025 Bedtime    montelukast (SINGULAIR) 10 MG tablet Take 10 mg by mouth at bedtime.  Unknown    multivitamin (ONE-DAILY) tablet Take 1 tablet by mouth daily. 7/1/2025: New rx. Has not picked up yet Taking    naltrexone (DEPADE/REVIA) 50 MG tablet Take 0.5 tablets by mouth at bedtime.  6/30/2025 Bedtime    risperiDONE (RISPERDAL M-TABS) 0.5 MG ODT Take 1 tablet by mouth at bedtime. Take with 3 mg tab to total 3.5 mg  6/30/2025 Bedtime    risperiDONE (RISPERDAL M-TABS) 3 MG ODT 1 tablet at bedtime. Take with 0.5 mg tab to total 3.5 mg  6/30/2025 Bedtime    risperiDONE (RISPERDAL) 0.5 MG tablet Take 1 tablet by mouth 2 times daily as needed.  Unknown    rosuvastatin (CRESTOR) 20 MG tablet Take 1 tablet (20 mg) by mouth daily.  6/30/2025 Bedtime
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
## 2025-07-01 NOTE — TELEPHONE ENCOUNTER
S: [facility] ED , DEC  Chin calling at 3:39 PM about 39 year old/female presenting with worsening psychosis, rambling and nonsensical, paranoid and making delusional statements about daughter and daughters boyfriend. Pt MH appears to have decompensated.     B: Pt arrived via Self . Presenting problem, stressors: Pt unable to identify any stressors.    Pt affect in ED: Flat  Pt Dx: Major Depressive Disorder, Generalized Anxiety Disorder, and Schizoaffective Disorder  Previous IPMH hx? Yes: [facility] 2022.  Pt denies SI   Hx of suicide attempt? Yes: via overdose as a teen.  Pt denies SIB  Pt denies HI   Pt denies hallucinations .   Pt RARS Score: 2    Hx of aggression/violence, sexual offenses, legal concerns, Epic care plan? describe: None  Current concerns for aggression this visit? No  Does pt have a history of Civil Commitment? No  Is Pt their own guardian? Yes    Pt is prescribed medication. Is patient medication compliant? Yes  Pt endorses OP services: Psychiatrist, Therapist, and   CD concerns: None  Acute or chronic medical concerns: No  Does Pt present with specific needs, assistive devices, or exclusionary criteria? None      Pt is ambulatory  Pt is able to perform ADLs independently      A: Pt to be reviewed for Critical access hospital admission. Pt is Voluntary  Preferred placement: [facility] +1    COVID Symptoms: No  If yes, COVID test required   Utox: Negative   CMP: Abnormalities: Carbon Dioxide (CO2) 20,Glucose 159, AST 99, ALT 61   CBC: WNL  HCG: Not ordered, intake to request lab     R: Patient cleared and ready for behavioral bed placement: Yes  Pt placed on IP worklist? Yes    Does Patient need a Transfer Center request created? Yes, writer completed Transfer Center request at:  Updated MD TC @ 3:51 PM    3:58 PM Luann can have [facility] review for potential admission. Will use care everywhere only need face sheet faxed.    4:06 PM Fax sent.    4:55 PM Writer received call from Luann at [facility] that [facility] is  unable to see consult in Epic.     4:57 PM Consult/notes faxed for review.    R:  [place]  Access Inpatient Bed Call Log 7/1/2025 8:12:26 AM  Intake has called facilities that have not updated the bed status within the last 12 hours.         (Adults);        [facility]:                [facility] is posting 0 beds.             [facility] is posting 0 beds. [phone] 8:21AM PER EDISON BOWLES AT [facility].  [facility] is posting 0 beds. Negative covid required.   [facility] is posting 0 beds. Neg covid. No high school/[facility]-psych. [phone] 8:20AM PER MARILEE FERRO AT NOON.  PER CALL AT 3:56 PM TO GEORGE NO APPROPRIATE D/T PT'S PRESENTATION.  [facility] is posting 0 beds. [phone]   [facility] is posting 0 bed. [phone]    [facility] is posting 6 beds. (Ages 18-35) Negative covid. [phone] 8:13AM PER LAST, 1 LIAM BED, COUPLE OF ADOL, NO CHILD, AND FEW YA BEDS. PT NOT APPROPRIATE D/T UNIT RESTRICTIONS.  [facility] is posting 0 beds.    [facility] is posting 0 beds [phone]      [facility] is posting 10 beds. LOW acuity ONLY. Mixed unit 12+. Negative covid- [phone] PER CALL AT 3:52 PM TO BLAYNE AT [facility].  [facility] has 2 beds posted. No aggression. Negative Covid. Low acuity.   [facility] is posting 1 beds. Low acuity only. Neg covid.  [phone] 8:17AM PER [facility] BEDS AVAILABLE.   [facility] is posting 2 beds. Low acuity. No current aggression.     [facility] is posting 0 beds. Negative covid. [phone].      Pt remains on the work list pending appropriate bed availability.

## 2025-07-01 NOTE — PLAN OF CARE
Maddy Ortiz  July 1, 2025  Plan of Care Hand-off Note     Patient Recommended Care Path: inpatient mental health    Clinical Substantiation:  Pt presents to ED for paranoid delusions. Pt experiencing an acute exacerbation of chronic psychosis. Pt having delusions and paranoid thinking regarding her daughter and daughters boyfriend. Pt is rambling, Pt's speech is pressured and rapid. It is difficult for writer to follow pt's story and explanation of what is going on due to pt's fluctuating coherence. Pt may be unpredictable in the community due to delusions regarding her daughter and boyfriend. She requires a more restrictive level of care. Writer reached out to pt's father (Jay Jay Ortiz (Father) [phone]) but no answer, LVM. Pt reports her father can likely check in on her daughter while pt is in the hospital. Pt states her daughter is independent and has no concerns for her safety being alone at the house. Pt provided phone number for her daughter (Jemima, [phone]). Writer contacted but no answer, LVM requesting pt get into contact with her grandfather or reach out to the hospital. Pt denies SI, HI, NSSIB, substance abuse. SHe is voluntary for admission.    Goals for crisis stabilization:  [facility]    Next steps for Care Team:  [facility]    Treatment Objectives Addressed:  rapport building, processing feelings    Therapeutic Interventions:  Engaged in cognitive restructuring/ reframing, looked at common cognitive distortions and challenged negative thoughts.    Has a specific means been identified for suicidal.homicide actions: No      Patient coping skills attempted to reduce the crisis:  medications, writing emails, not thinking about past          Severe psychiatric, behavioral or other comorbid conditions are appropriate for management at inpatient mental health as indicated by at least one of the following: Psychiatric Symptoms, Symptoms of impact to function, Impaired impulse control, judgement, or  insight  Severe dysfunction in daily living is present as indicated by at least one of the following: Other evidence of severe dysfunction  Situation and expectations are appropriate for inpatient care: Voluntary treatment at lower level of care is not feasible  Inpatient mental health services are necessary to meet patient needs and at least one of the following: Specific condition related to admission diagnosis is present and judged likely to further improve at proposed level of care      Collateral contact information:  Writer reached out to pt's father (Jay Jay Ortiz (Father) [phone]) but no answer, LVM. Pt reports her father can likely check in on her daughter while pt is in the hospital. Pt states her daughter is independent and has no concerns for her safety being alone at the house. Pt provided phone number for her daughter (Jemima, [phone]). Writer contacted but no answer, LVM requesting pt get into contact with her grandfather or reach out to the hospital. Writer called father again at 2:30 PM, no answer.    Legal Status: Voluntary/Patient has signed consent for treatment                                                                                                                                 Reviewed court records: yes     Psychiatry Consult:     LAINEY Gaytan
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
## 2025-07-02 ENCOUNTER — RESULTS FOLLOW-UP (OUTPATIENT)
Dept: NURSING | Facility: CLINIC | Age: 40
End: 2025-07-02

## 2025-07-02 LAB — LAMOTRIGINE SERPL-MCNC: <0.9 UG/ML

## 2025-07-14 ENCOUNTER — TELEPHONE (OUTPATIENT)
Dept: FAMILY MEDICINE | Facility: CLINIC | Age: 40
End: 2025-07-14
Payer: COMMERCIAL

## 2025-07-14 NOTE — TELEPHONE ENCOUNTER
Forwarding medication change request below to PCP for review please. Not sure if this is appropriate or what equivalent dosing would be.   Patient does have an upcoming visit scheduled for 8/6/25.   Would you like to wait until then to discuss?    Chiquis Roe RN  [facility]

## 2025-07-14 NOTE — TELEPHONE ENCOUNTER
New Medication Request    What medication are you requesting?: Pt would like to stop taking the Victoza and start using Jardiance.  Pt states that her insurance will cover 10mg or 20mg Jardiance and she would like Rx sent to [facility].      Please call patient and advise.      Reason for medication request: Easier to take    Have you taken this medication before?: Yes: She was in this while in hospital and liked it more.    Controlled Substance Agreement on file:   CSA -- Patient Level:    CSA: None found at the patient level.       Patient offered an appointment? No    Preferred Pharmacy:  [id number] - [place], [place] - [address]  [address]  [address]  [facility] [address]  Phone: [phone] Fax: [phone]    Could we send this information to you in [place] or would you prefer to receive a phone call?:   Patient would prefer a phone call   Okay to leave a detailed message?: Yes at Cell number on file:    Telephone Information:   Mobile [phone]

## 2025-07-16 ENCOUNTER — MYC MEDICAL ADVICE (OUTPATIENT)
Dept: FAMILY MEDICINE | Facility: CLINIC | Age: 40
End: 2025-07-16
Payer: COMMERCIAL

## 2025-07-16 DIAGNOSIS — E03.9 ACQUIRED HYPOTHYROIDISM: ICD-10-CM

## 2025-07-16 RX ORDER — LEVOTHYROXINE SODIUM 50 UG/1
50 TABLET ORAL DAILY
Qty: 90 TABLET | Refills: 3 | Status: SHIPPED | OUTPATIENT
Start: 2025-07-16

## 2025-07-28 ENCOUNTER — TRANSFERRED RECORDS (OUTPATIENT)
Dept: HEALTH INFORMATION MANAGEMENT | Facility: CLINIC | Age: 40
End: 2025-07-28
Payer: COMMERCIAL

## 2025-07-29 ENCOUNTER — HOSPITAL ENCOUNTER (EMERGENCY)
Facility: CLINIC | Age: 40
Discharge: HOME | End: 2025-07-29
Attending: FAMILY MEDICINE | Admitting: FAMILY MEDICINE
Payer: COMMERCIAL

## 2025-07-29 ENCOUNTER — NURSE TRIAGE (OUTPATIENT)
Dept: FAMILY MEDICINE | Facility: CLINIC | Age: 40
End: 2025-07-29
Payer: COMMERCIAL

## 2025-07-29 VITALS
RESPIRATION RATE: 16 BRPM | OXYGEN SATURATION: 98 % | HEART RATE: 81 BPM | SYSTOLIC BLOOD PRESSURE: 120 MMHG | TEMPERATURE: 98.1 F | DIASTOLIC BLOOD PRESSURE: 79 MMHG

## 2025-07-29 DIAGNOSIS — H81.12 BENIGN PAROXYSMAL POSITIONAL VERTIGO OF LEFT EAR: Primary | ICD-10-CM

## 2025-07-29 PROCEDURE — 99283 EMERGENCY DEPT VISIT LOW MDM: CPT | Performed by: FAMILY MEDICINE

## 2025-07-29 PROCEDURE — 250N000013 HC RX MED GY IP 250 OP 250 PS 637: Performed by: FAMILY MEDICINE

## 2025-07-29 PROCEDURE — 99284 EMERGENCY DEPT VISIT MOD MDM: CPT | Performed by: FAMILY MEDICINE

## 2025-07-29 PROCEDURE — 250N000011 HC RX IP 250 OP 636: Performed by: FAMILY MEDICINE

## 2025-07-29 RX ORDER — ONDANSETRON 4 MG/1
4-8 TABLET, ORALLY DISINTEGRATING ORAL EVERY 8 HOURS PRN
Qty: 12 TABLET | Refills: 0 | Status: SHIPPED | OUTPATIENT
Start: 2025-07-29

## 2025-07-29 RX ORDER — MECLIZINE HYDROCHLORIDE 25 MG/1
25 TABLET ORAL ONCE
Status: COMPLETED | OUTPATIENT
Start: 2025-07-29 | End: 2025-07-29

## 2025-07-29 RX ORDER — ONDANSETRON 4 MG/1
4 TABLET, ORALLY DISINTEGRATING ORAL ONCE
Status: COMPLETED | OUTPATIENT
Start: 2025-07-29 | End: 2025-07-29

## 2025-07-29 RX ORDER — MECLIZINE HYDROCHLORIDE 25 MG/1
25 TABLET ORAL 3 TIMES DAILY PRN
Qty: 12 TABLET | Refills: 0 | Status: SHIPPED | OUTPATIENT
Start: 2025-07-29

## 2025-07-29 RX ADMIN — ONDANSETRON 4 MG: 4 TABLET, ORALLY DISINTEGRATING ORAL at 19:17

## 2025-07-29 RX ADMIN — MECLIZINE HYDROCHLORIDE 25 MG: 25 TABLET ORAL at 19:17

## 2025-07-29 ASSESSMENT — COLUMBIA-SUICIDE SEVERITY RATING SCALE - C-SSRS
2. HAVE YOU ACTUALLY HAD ANY THOUGHTS OF KILLING YOURSELF IN THE PAST MONTH?: NO
6. HAVE YOU EVER DONE ANYTHING, STARTED TO DO ANYTHING, OR PREPARED TO DO ANYTHING TO END YOUR LIFE?: NO
1. IN THE PAST MONTH, HAVE YOU WISHED YOU WERE DEAD OR WISHED YOU COULD GO TO SLEEP AND NOT WAKE UP?: NO

## 2025-07-29 ASSESSMENT — ACTIVITIES OF DAILY LIVING (ADL)
ADLS_ACUITY_SCORE: 50
ADLS_ACUITY_SCORE: 50

## 2025-07-29 NOTE — TELEPHONE ENCOUNTER
Nurse Triage SBAR    Is this a 2nd Level Triage? YES, LICENSED PRACTITIONER REVIEW IS REQUIRED    Situation: patient called the clinic to see if she should be seen in the ED.     Background: Patient has a history of poly pharmacy, SI join tissues, neck pain, hip pain. DM2.     Assessment: patient has had some intense moments of dizziness and nausea. She said this has been going on since she had an incident where her shoulder popped out of place. She said it has been dislocated before but it popped out, fine now. She also intentionally cracked her back while laying on her back on Sunday, and as soon as she moved she felt intense tingling in her back.   The dizziness causes issus with standing, becomes nauseated she will have to close her eyes and sit down. Once she sits or lays down, it subsides in about a minute and a half. No headaches or neck pain. No chest pain or shortness of breath. She hadn't checked her BS. She checked while on the phone and her BS was 96.   No palpitations.     RN has concerns regarding patients possible spine related issues and symptoms.     Protocol Recommended Disposition:   Call ADS/Go to ED/UCC Now (Or To Office with PCP Approval)    Recommendation: recommended that she be seen in the ED. Patient understands.  She will go to the ED.     Routed to provider    Does the patient meet one of the following criteria for ADS visit consideration? No         Reason for Disposition   Spinning or tilting sensation (vertigo) present now and one or more stroke risk factors (i.e., hypertension, diabetes mellitus, prior stroke/TIA, heart attack, age over 60) (Exception: Prior physician evaluation for this AND no different/worse than usual.)   SEVERE dizziness (e.g., unable to stand, requires support to walk, feels like passing out now)    Additional Information   Negative: SEVERE difficulty breathing (e.g., struggling for each breath, speaks in single words)   Negative: Shock suspected (e.g.,  cold/pale/clammy skin, too weak to stand, low BP, rapid pulse)   Negative: Difficult to awaken or acting confused (e.g., disoriented, slurred speech)   Negative: Fainted, and still feels dizzy afterwards   Negative: Overdose (accidental or intentional) of medications   Negative: New neurologic deficit that is present now: * Weakness of the face, arm, or leg on one side of the body * Numbness of the face, arm, or leg on one side of the body * Loss of speech or garbled speech   Negative: Heart beating < 50 beats per minute OR > 140 beats per minute   Negative: Sounds like a life-threatening emergency to the triager   Negative: Chest pain   Negative: Rectal bleeding, bloody stool, or tarry-black stool   Negative: Vomiting is main symptom   Negative: Diarrhea is main symptom   Negative: Headache is main symptom   Negative: Heat exhaustion suspected (i.e., dehydration from heat exposure)   Negative: Patient states that they are having an anxiety or panic attack   Negative: Dizziness from low blood sugar (i.e., < 60 mg/dl or 3.5 mmol/l)    Protocols used: Dizziness-A-OH    Lexie Pabon RN on 7/29/2025 at 5:22 PM

## 2025-07-29 NOTE — ED TRIAGE NOTES
Pt c/o dizziness when turning head.  Dizziness since Sunday.  Nausea.  No headache.       Triage Assessment (Adult)       Row Name 07/29/25 6561          Triage Assessment    Airway WDL WDL        Respiratory WDL    Respiratory WDL WDL        Cognitive/Neuro/Behavioral WDL    Cognitive/Neuro/Behavioral WDL WDL

## 2025-07-29 NOTE — ED PROVIDER NOTES
/76   Pulse 88   Temp 97.9  F (36.6  C) (Tympanic)   Resp 18   SpO2 96%     Maddy Ortiz is a 39-year-old female presenting with complaints of acute onset of nausea and dizziness that began yesterday.  Given patient's history, I recommended he/she be evaluated in the emergency department.  We discussed that he/she will likely require further testing and/or treatment beyond the capabilities of the current urgent care setting including labs and potential imaging.  Patient expresses understanding of this and agreement with the plan. They were transferred to ED waiting area in stable condition.        Juliana Harding PA-C  07/29/25 5947

## 2025-07-30 ENCOUNTER — TELEPHONE (OUTPATIENT)
Dept: FAMILY MEDICINE | Facility: CLINIC | Age: 40
End: 2025-07-30
Payer: COMMERCIAL

## 2025-07-30 NOTE — ED PROVIDER NOTES
HPI   Patient is a 39-year-old female presenting with dizziness.  Per my chart review, the patient has a known history of mental health related problems.  No recent medication changes reported.  She has not had vertigo previously.  No history of Ménière's disease.  No history of CVA.    The patient describes new onset dizziness starting Sunday, 2 days ago.  Her dizziness is intermittent and predictable.  When she turns her head or rolls over or changes positions the dizziness comes back.  She describes it as spinning and is associated with nausea.  No headache.  No neck pain.  No trauma or injury.  No ear pain.  No tinnitus.  No changes in hearing.  No drainage from the ears.  No fever.  No sinus congestion or nasal discharge.  No dental pain.  No palpitations or lightheadedness.  No chest pain.  No shortness of breath.  No skin rash.      Allergies:  Allergies   Allergen Reactions    Dust Mites Shortness Of Breath    Animal Dander      Other reaction(s): *Unknown    Mold      Other reaction(s): Runny Nose    Trees      Problem List:    Patient Active Problem List    Diagnosis Date Noted    Hyperlipidemia LDL goal <100 01/24/2025     Priority: Medium    Acquired hypothyroidism 09/24/2024     Priority: Medium    Difficulty using pragmatics in communication 04/12/2024     Priority: Medium    Difficulty communicating 11/20/2023     Priority: Medium    Neck pain 10/06/2023     Priority: Medium    Hip pain, right 10/06/2023     Priority: Medium    Chronic pain of both knees 10/06/2023     Priority: Medium    mirena IUD 9/30/22 09/30/2022     Priority: Medium     Mirena IUD placed 9/30/2022  LOT# [id number]  Exp: 10/2024  NDC# 65600-819-14    Lexie Cartagena on 9/30/2022 at 1:35 PM          Overdose, undetermined intent, initial encounter 06/18/2022     Priority: Medium    Segmental and somatic dysfunction 05/10/2022     Priority: Medium    SI (sacroiliac) joint dysfunction 05/10/2022     Priority: Medium    Polypharmacy  04/25/2022     Priority: Medium    Sedated due to multiple medications 04/25/2022     Priority: Medium    Disorganized behavior 01/08/2022     Priority: Medium    Infection due to 2019 novel coronavirus 01/08/2022     Priority: Medium    Type 2 diabetes mellitus without complication, without long-term current use of insulin (H) 12/13/2021     Priority: Medium    Fatty liver 11/05/2021     Priority: Medium    Umbilical hernia without obstruction and without gangrene 10/26/2021     Priority: Medium    Morbid obesity (H) 01/02/2019     Priority: Medium    Paranoia (psychosis) (H) 08/24/2018     Priority: Medium    Gastroesophageal reflux disease without esophagitis 06/08/2018     Priority: Medium    Schizoaffective disorder, bipolar type (H) 05/07/2018     Priority: Medium    Seasonal allergic rhinitis due to pollen 11/04/2016     Priority: Medium    Allergic rhinitis due to mold 11/04/2016     Priority: Medium    Allergic rhinitis due to animal dander 11/04/2016     Priority: Medium    Allergic rhinitis due to dust mite 11/04/2016     Priority: Medium    Depression with anxiety 01/26/2015     Priority: Medium    Insomnia 07/18/2013     Priority: Medium    Bipolar 1 disorder (H) 12/06/2012     Priority: Medium     Planning on seeing Purvi (psychiatric nurse)      Paranoid type delusional disorder (H) 11/14/2012     Priority: Medium    Psychosis (H) 10/16/2012     Priority: Medium    Animal dander allergy 05/09/2012     Priority: Medium     Dog and Cat      CARDIOVASCULAR SCREENING; LDL GOAL LESS THAN 160 05/09/2012     Priority: Medium      Past Medical History:    Past Medical History:   Diagnosis Date    Bipolar 1 disorder (H) 12/6/2012    Depressive disorder     Diabetes mellitus, type 2 (H) 12/13/2021    Paranoid type delusional disorder (H) 11/14/2012     Past Surgical History:    Past Surgical History:   Procedure Laterality Date    HERNIORRHAPHY, UMBILICAL, ROBOT-ASSISTED, LAPAROSCOPIC, USING DA MERCEDES XI N/A  9/26/2024    Procedure: HERNIORRHAPHY, UMBILICAL, ROBOT-ASSISTED, LAPAROSCOPIC, USING DA MERCEDES XI;  Surgeon: Shannon Mathias MD;  Location: [facility] OR    TONSILLECTOMY & ADENOIDECTOMY      as a child    TONSILLECTOMY & ADENOIDECTOMY       Family History:    Family History   Adopted: Yes   Problem Relation Age of Onset    Unknown/Adopted Mother     Unknown/Adopted Father     Unknown/Adopted Other     Hypertension Sister      Social History:  Marital Status:  Single [1]  Social History     Tobacco Use    Smoking status: Former     Current packs/day: 0.50     Types: Cigarettes    Smokeless tobacco: Former    Tobacco comments:     around 2nd hand smoke   Vaping Use    Vaping status: Never Used   Substance Use Topics    Alcohol use: Not Currently    Drug use: Not Currently      Medications:    ACCU-CHEK GUIDE test strip  acetaminophen (TYLENOL) 325 MG tablet  albuterol (PROAIR HFA/PROVENTIL HFA/VENTOLIN HFA) 108 (90 Base) MCG/ACT inhaler  blood glucose (ONETOUCH VERIO IQ) test strip  Blood Glucose Monitoring Suppl (ONETOUCH VERIO FLEX SYSTEM) w/Device KIT  desvenlafaxine (PRISTIQ) 50 MG 24 hr tablet  fexofenadine (ALLEGRA) 180 MG tablet  fluticasone (FLONASE) 50 MCG/ACT nasal spray  insulin pen needle (BD ROZ U/F) 32G X 4 MM miscellaneous  levocetirizine (XYZAL) 5 MG tablet  levonorgestrel (MIRENA) 20 MCG/DAY IUD  levothyroxine (SYNTHROID/LEVOTHROID) 50 MCG tablet  liraglutide (VICTOZA) 18 MG/3ML solution  lithium ER (LITHOBID) 300 MG CR tablet  meclizine (ANTIVERT) 25 MG tablet  montelukast (SINGULAIR) 10 MG tablet  multivitamin (ONE-DAILY) tablet  naltrexone (DEPADE/REVIA) 50 MG tablet  ondansetron (ZOFRAN ODT) 4 MG ODT tab  risperiDONE (RISPERDAL M-TABS) 0.5 MG ODT  risperiDONE (RISPERDAL M-TABS) 3 MG ODT  risperiDONE (RISPERDAL) 0.5 MG tablet  rosuvastatin (CRESTOR) 20 MG tablet  spacer (OPTICHAMBER APOLINAR) holding chamber      Review of Systems   All other systems reviewed and are negative.      PE   BP: 112/76  Pulse:  88  Temp: 97.9  F (36.6  C)  Resp: 18  SpO2: 96 %  Physical Exam  Vitals reviewed.   Constitutional:       Appearance: She is well-developed.   HENT:      Head: Normocephalic and atraumatic.      Right Ear: External ear normal.      Left Ear: External ear normal.      Nose: Nose normal.      Mouth/Throat:      Mouth: Mucous membranes are moist.      Pharynx: Oropharynx is clear.   Eyes:      Extraocular Movements: Extraocular movements intact.      Conjunctiva/sclera: Conjunctivae normal.      Pupils: Pupils are equal, round, and reactive to light.   Cardiovascular:      Rate and Rhythm: Normal rate and regular rhythm.   Pulmonary:      Effort: Pulmonary effort is normal.   Musculoskeletal:         General: Normal range of motion.      Cervical back: Normal range of motion.   Skin:     General: Skin is warm and dry.   Neurological:      General: No focal deficit present.      Mental Status: She is oriented to person, place, and time.      Comments: The patient was put in Trendelenburg position and when I turned her head to the left she had sudden onset vertigo.  She had nystagmus identified, unknown direction but horizontal and not vertical or rotatory.   Psychiatric:         Behavior: Behavior normal.         ED COURSE and [facility]   1907.  Reproducible vertigo, likely benign positional vertigo.  I will provide a link for treatment with physical therapy.  I will provide meclizine and Zofran here.  Low concern for stroke.  No neurological deficit.  No headache.  No other systemic symptoms.  Symptoms are reproducible predictable.  Vertigo extinguishes with rest.    1954.  Patient with improved symptoms.  Meclizine and Zofran will be provided for home.  Follow up as needed.  Return for worsening.    Electronic medical chart reviewed, including medical problems, medications, medical allergies, social history.  Recent hospitalizations and surgical procedures reviewed.  Recent clinic visits and consultations reviewed.  Recent  labs and test results reviewed.  Nursing notes reviewed.    The patient, their parent if applicable, and/or their medical decision maker(s) and I have reviewed all of the available historical information, applicable PMH, physical exam findings, and objective diagnostic data gathered during this ED visit.  We then discussed all work-up options and then together agreed upon the course taken during this visit.  The ultimate disposition and plan was a cooperative decision made between myself and the patient, their parent if applicable, and/or their legal decision maker(s).  The risks and benefits of all decisions made during this visit were discussed to the best of my abilities given the circumstances, and all parties are understanding of the pertinent ramifications of these decisions.      LABS  Labs Ordered and Resulted from Time of ED Arrival to Time of ED Departure - No data to display    IMAGING  Images reviewed by me.  Radiology report also reviewed.  No orders to display       Procedures    Medications   meclizine (ANTIVERT) tablet 25 mg (25 mg Oral $Given 7/29/25 1917)   ondansetron (ZOFRAN ODT) ODT tab 4 mg (4 mg Oral $Given 7/29/25 1917)         IMPRESSION       ICD-10-CM    1. Benign paroxysmal positional vertigo of left ear  H81.12                Medication List        Started      meclizine 25 MG tablet  Commonly known as: ANTIVERT  25 mg, Oral, 3 TIMES DAILY PRN     ondansetron 4 MG ODT tab  Commonly known as: ZOFRAN ODT  4-8 mg, Oral, EVERY 8 HOURS PRN                                  Chapin Licona MD  07/29/25 1954

## 2025-07-30 NOTE — ED NOTES
"Writer went to bedside to reasses post medications. Pt reports dizziness is gone and feels better. Reports slight nausea but states that \"I can deal with that\"  "

## 2025-07-30 NOTE — DISCHARGE INSTRUCTIONS
RETURN TO THE EMERGENCY ROOM FOR THE FOLLOWING:    Severely worsened dizziness, vomiting and dehydration, fever greater than 101, severe neck or head pain, new weakness or incoordination of your extremities, new difficulty with speech, new facial droop, or at anytime for any concern.    FOLLOW UP:    None recommended at this point.    TREATMENT RECOMMENDATIONS:    Meclizine as needed for dizziness.  Zofran as needed for nausea.    Look up the following on YouTube:   Nubia's explanation of the Epley Maneuver for vertigo.    NURSE ADVICE LINE:  [phone] or [phone]

## 2025-07-31 ENCOUNTER — OFFICE VISIT (OUTPATIENT)
Dept: OBGYN | Facility: CLINIC | Age: 40
End: 2025-07-31
Payer: COMMERCIAL

## 2025-07-31 VITALS
DIASTOLIC BLOOD PRESSURE: 88 MMHG | HEART RATE: 80 BPM | WEIGHT: 234 LBS | SYSTOLIC BLOOD PRESSURE: 139 MMHG | TEMPERATURE: 97.2 F | BODY MASS INDEX: 41.45 KG/M2

## 2025-07-31 DIAGNOSIS — Z11.3 SCREEN FOR STD (SEXUALLY TRANSMITTED DISEASE): ICD-10-CM

## 2025-07-31 DIAGNOSIS — Z11.3 SCREENING FOR STD (SEXUALLY TRANSMITTED DISEASE): ICD-10-CM

## 2025-07-31 DIAGNOSIS — Z11.3 ROUTINE SCREENING FOR STI (SEXUALLY TRANSMITTED INFECTION): ICD-10-CM

## 2025-07-31 DIAGNOSIS — R30.0 DYSURIA: Primary | ICD-10-CM

## 2025-07-31 DIAGNOSIS — Z11.59 ENCOUNTER FOR SCREENING FOR OTHER VIRAL DISEASES: ICD-10-CM

## 2025-07-31 DIAGNOSIS — Z11.59 NEED FOR HEPATITIS B SCREENING TEST: ICD-10-CM

## 2025-07-31 LAB
ALBUMIN UR-MCNC: NEGATIVE MG/DL
APPEARANCE UR: ABNORMAL
BACTERIA #/AREA URNS HPF: ABNORMAL /HPF
BILIRUB UR QL STRIP: NEGATIVE
C TRACH DNA SPEC QL PROBE+SIG AMP: NEGATIVE
CLUE CELLS: ABNORMAL
COLOR UR AUTO: YELLOW
GLUCOSE UR STRIP-MCNC: NEGATIVE MG/DL
HBV SURFACE AG SERPL QL IA: NONREACTIVE
HCV AB SERPL QL IA: NONREACTIVE
HGB UR QL STRIP: ABNORMAL
HIV 1+2 AB+HIV1 P24 AG SERPL QL IA: NONREACTIVE
KETONES UR STRIP-MCNC: NEGATIVE MG/DL
LEUKOCYTE ESTERASE UR QL STRIP: ABNORMAL
MUCOUS THREADS #/AREA URNS LPF: PRESENT /LPF
N GONORRHOEA DNA SPEC QL NAA+PROBE: NEGATIVE
NITRATE UR QL: POSITIVE
PH UR STRIP: 7 [PH] (ref 5–7)
RBC #/AREA URNS AUTO: ABNORMAL /HPF
SP GR UR STRIP: 1.01 (ref 1–1.03)
SPECIMEN TYPE: NORMAL
SQUAMOUS #/AREA URNS AUTO: ABNORMAL /LPF
T PALLIDUM AB SER QL: NONREACTIVE
TRICHOMONAS, WET PREP: ABNORMAL
UROBILINOGEN UR STRIP-ACNC: 0.2 E.U./DL
WBC #/AREA URNS AUTO: ABNORMAL /HPF
WBC CLUMPS #/AREA URNS HPF: PRESENT /HPF
WBC'S/HIGH POWER FIELD, WET PREP: ABNORMAL
YEAST, WET PREP: ABNORMAL

## 2025-07-31 RX ORDER — NITROFURANTOIN 25; 75 MG/1; MG/1
100 CAPSULE ORAL 2 TIMES DAILY
Qty: 14 CAPSULE | Refills: 0 | Status: SHIPPED | OUTPATIENT
Start: 2025-07-31

## 2025-07-31 NOTE — NURSING NOTE
"Initial /88 (BP Location: Left arm, Patient Position: Chair, Cuff Size: Adult Large)   Pulse 80   Temp 97.2  F (36.2  C) (Tympanic)   Wt 106.1 kg (234 lb)   BMI 41.45 kg/m   Estimated body mass index is 41.45 kg/m  as calculated from the following:    Height as of 7/1/25: 1.6 m (5' 3\").    Weight as of this encounter: 106.1 kg (234 lb). .      Sandra Ribera MA    "
No
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
## 2025-08-02 LAB — BACTERIA UR CULT: NORMAL

## 2025-08-06 ENCOUNTER — VIRTUAL VISIT (OUTPATIENT)
Dept: FAMILY MEDICINE | Facility: CLINIC | Age: 40
End: 2025-08-06
Payer: COMMERCIAL

## 2025-08-06 DIAGNOSIS — F25.0 SCHIZOAFFECTIVE DISORDER, BIPOLAR TYPE (H): Primary | Chronic | ICD-10-CM

## 2025-08-06 DIAGNOSIS — H81.13 BENIGN PAROXYSMAL POSITIONAL VERTIGO, BILATERAL: ICD-10-CM

## 2025-08-06 DIAGNOSIS — F22 PARANOIA (PSYCHOSIS) (H): ICD-10-CM

## 2025-08-06 DIAGNOSIS — Z09 HOSPITAL DISCHARGE FOLLOW-UP: ICD-10-CM

## 2025-08-06 PROCEDURE — 98013 SYNCH AUDIO-ONLY EST LOW 20: CPT | Performed by: NURSE PRACTITIONER

## 2025-08-08 ENCOUNTER — MYC MEDICAL ADVICE (OUTPATIENT)
Dept: ADMISSION | Facility: CLINIC | Age: 40
End: 2025-08-08

## 2025-08-09 ENCOUNTER — MYC MEDICAL ADVICE (OUTPATIENT)
Dept: FAMILY MEDICINE | Facility: CLINIC | Age: 40
End: 2025-08-09
Payer: COMMERCIAL

## 2025-08-11 ENCOUNTER — HOSPITAL ENCOUNTER (EMERGENCY)
Facility: CLINIC | Age: 40
Discharge: HOME | End: 2025-08-11
Attending: NURSE PRACTITIONER | Admitting: NURSE PRACTITIONER
Payer: COMMERCIAL

## 2025-08-11 VITALS
SYSTOLIC BLOOD PRESSURE: 141 MMHG | TEMPERATURE: 97.7 F | HEART RATE: 100 BPM | DIASTOLIC BLOOD PRESSURE: 83 MMHG | RESPIRATION RATE: 18 BRPM | OXYGEN SATURATION: 97 %

## 2025-08-11 DIAGNOSIS — B00.9 HSV (HERPES SIMPLEX VIRUS) INFECTION: Primary | ICD-10-CM

## 2025-08-11 PROCEDURE — 87529 HSV DNA AMP PROBE: CPT | Performed by: NURSE PRACTITIONER

## 2025-08-11 PROCEDURE — G0463 HOSPITAL OUTPT CLINIC VISIT: HCPCS | Performed by: NURSE PRACTITIONER

## 2025-08-11 PROCEDURE — 99213 OFFICE O/P EST LOW 20 MIN: CPT | Performed by: NURSE PRACTITIONER

## 2025-08-11 RX ORDER — VALACYCLOVIR HYDROCHLORIDE 1 G/1
1000 TABLET, FILM COATED ORAL 2 TIMES DAILY
Qty: 14 TABLET | Refills: 0 | Status: SHIPPED | OUTPATIENT
Start: 2025-08-11 | End: 2025-08-18

## 2025-08-11 ASSESSMENT — COLUMBIA-SUICIDE SEVERITY RATING SCALE - C-SSRS
6. HAVE YOU EVER DONE ANYTHING, STARTED TO DO ANYTHING, OR PREPARED TO DO ANYTHING TO END YOUR LIFE?: NO
1. IN THE PAST MONTH, HAVE YOU WISHED YOU WERE DEAD OR WISHED YOU COULD GO TO SLEEP AND NOT WAKE UP?: NO
2. HAVE YOU ACTUALLY HAD ANY THOUGHTS OF KILLING YOURSELF IN THE PAST MONTH?: NO

## 2025-08-11 ASSESSMENT — ACTIVITIES OF DAILY LIVING (ADL): ADLS_ACUITY_SCORE: 50

## 2025-08-12 LAB
HSV1 DNA SPEC QL NAA+PROBE: NOT DETECTED
HSV2 DNA SPEC QL NAA+PROBE: NOT DETECTED
SPECIMEN TYPE: NORMAL

## 2025-08-13 ENCOUNTER — TELEPHONE (OUTPATIENT)
Dept: FAMILY MEDICINE | Facility: CLINIC | Age: 40
End: 2025-08-13

## 2025-08-13 ENCOUNTER — OFFICE VISIT (OUTPATIENT)
Dept: FAMILY MEDICINE | Facility: CLINIC | Age: 40
End: 2025-08-13
Payer: COMMERCIAL

## 2025-08-13 ENCOUNTER — HOSPITAL ENCOUNTER (OUTPATIENT)
Dept: MAMMOGRAPHY | Facility: CLINIC | Age: 40
Discharge: HOME | End: 2025-08-13
Attending: NURSE PRACTITIONER
Payer: COMMERCIAL

## 2025-08-13 ENCOUNTER — MYC MEDICAL ADVICE (OUTPATIENT)
Dept: FAMILY MEDICINE | Facility: CLINIC | Age: 40
End: 2025-08-13

## 2025-08-13 VITALS
OXYGEN SATURATION: 95 % | HEART RATE: 90 BPM | HEIGHT: 63 IN | RESPIRATION RATE: 16 BRPM | WEIGHT: 232 LBS | SYSTOLIC BLOOD PRESSURE: 124 MMHG | TEMPERATURE: 99.1 F | DIASTOLIC BLOOD PRESSURE: 86 MMHG | BODY MASS INDEX: 41.11 KG/M2

## 2025-08-13 DIAGNOSIS — B00.9 HSV (HERPES SIMPLEX VIRUS) INFECTION: Primary | ICD-10-CM

## 2025-08-13 DIAGNOSIS — F25.0 SCHIZOAFFECTIVE DISORDER, BIPOLAR TYPE (H): Chronic | ICD-10-CM

## 2025-08-13 DIAGNOSIS — F22 PARANOID TYPE DELUSIONAL DISORDER (H): ICD-10-CM

## 2025-08-13 DIAGNOSIS — Z31.69 PRE-CONCEPTION COUNSELING: ICD-10-CM

## 2025-08-13 DIAGNOSIS — F25.0 SCHIZOAFFECTIVE DISORDER, BIPOLAR TYPE (H): Primary | Chronic | ICD-10-CM

## 2025-08-13 DIAGNOSIS — F22 PARANOIA (PSYCHOSIS) (H): ICD-10-CM

## 2025-08-13 DIAGNOSIS — Z12.31 VISIT FOR SCREENING MAMMOGRAM: ICD-10-CM

## 2025-08-13 DIAGNOSIS — F41.8 DEPRESSION WITH ANXIETY: ICD-10-CM

## 2025-08-13 PROCEDURE — 1126F AMNT PAIN NOTED NONE PRSNT: CPT | Performed by: NURSE PRACTITIONER

## 2025-08-13 PROCEDURE — 3079F DIAST BP 80-89 MM HG: CPT | Performed by: NURSE PRACTITIONER

## 2025-08-13 PROCEDURE — 3074F SYST BP LT 130 MM HG: CPT | Performed by: NURSE PRACTITIONER

## 2025-08-13 PROCEDURE — 99214 OFFICE O/P EST MOD 30 MIN: CPT | Performed by: NURSE PRACTITIONER

## 2025-08-13 PROCEDURE — 77067 SCR MAMMO BI INCL CAD: CPT

## 2025-08-13 ASSESSMENT — PAIN SCALES - GENERAL: PAINLEVEL_OUTOF10: NO PAIN (0)

## 2025-08-13 ASSESSMENT — PATIENT HEALTH QUESTIONNAIRE - PHQ9
SUM OF ALL RESPONSES TO PHQ QUESTIONS 1-9: 7
SUM OF ALL RESPONSES TO PHQ QUESTIONS 1-9: 7
10. IF YOU CHECKED OFF ANY PROBLEMS, HOW DIFFICULT HAVE THESE PROBLEMS MADE IT FOR YOU TO DO YOUR WORK, TAKE CARE OF THINGS AT HOME, OR GET ALONG WITH OTHER PEOPLE: SOMEWHAT DIFFICULT

## 2025-08-14 ENCOUNTER — MYC MEDICAL ADVICE (OUTPATIENT)
Dept: FAMILY MEDICINE | Facility: CLINIC | Age: 40
End: 2025-08-14
Payer: COMMERCIAL

## 2025-08-17 ENCOUNTER — HEALTH MAINTENANCE LETTER (OUTPATIENT)
Age: 40
End: 2025-08-17

## 2025-08-17 ENCOUNTER — MYC REFILL (OUTPATIENT)
Dept: FAMILY MEDICINE | Facility: CLINIC | Age: 40
End: 2025-08-17
Payer: COMMERCIAL

## 2025-08-17 DIAGNOSIS — B00.9 HSV (HERPES SIMPLEX VIRUS) INFECTION: Primary | ICD-10-CM

## 2025-08-18 RX ORDER — VALACYCLOVIR HYDROCHLORIDE 1 G/1
1000 TABLET, FILM COATED ORAL 2 TIMES DAILY
Qty: 14 TABLET | Refills: 0 | Status: SHIPPED | OUTPATIENT
Start: 2025-08-18

## 2025-08-22 ENCOUNTER — MYC MEDICAL ADVICE (OUTPATIENT)
Dept: FAMILY MEDICINE | Facility: CLINIC | Age: 40
End: 2025-08-22
Payer: COMMERCIAL

## 2025-08-22 DIAGNOSIS — F22 PARANOID TYPE DELUSIONAL DISORDER (H): Primary | ICD-10-CM

## 2025-08-22 DIAGNOSIS — F25.0 SCHIZOAFFECTIVE DISORDER, BIPOLAR TYPE (H): Chronic | ICD-10-CM

## 2025-08-26 ENCOUNTER — MYC REFILL (OUTPATIENT)
Dept: FAMILY MEDICINE | Facility: CLINIC | Age: 40
End: 2025-08-26
Payer: COMMERCIAL

## 2025-08-26 ENCOUNTER — NURSE TRIAGE (OUTPATIENT)
Dept: NURSING | Facility: CLINIC | Age: 40
End: 2025-08-26
Payer: COMMERCIAL

## 2025-08-26 DIAGNOSIS — B00.9 HSV (HERPES SIMPLEX VIRUS) INFECTION: ICD-10-CM

## 2025-08-27 ENCOUNTER — HOSPITAL ENCOUNTER (EMERGENCY)
Facility: CLINIC | Age: 40
Discharge: HOME | End: 2025-08-27
Attending: PHYSICIAN ASSISTANT
Payer: COMMERCIAL

## 2025-08-27 RX ORDER — VALACYCLOVIR HYDROCHLORIDE 1 G/1
1000 TABLET, FILM COATED ORAL 2 TIMES DAILY
Qty: 14 TABLET | Refills: 0 | OUTPATIENT
Start: 2025-08-27

## 2025-08-27 ASSESSMENT — ACTIVITIES OF DAILY LIVING (ADL)
ADLS_ACUITY_SCORE: 50
ADLS_ACUITY_SCORE: 50

## 2025-08-28 ENCOUNTER — TRANSFERRED RECORDS (OUTPATIENT)
Dept: HEALTH INFORMATION MANAGEMENT | Facility: CLINIC | Age: 40
End: 2025-08-28

## 2025-09-02 RX ORDER — LITHIUM CARBONATE 300 MG/1
1200 TABLET, FILM COATED, EXTENDED RELEASE ORAL DAILY
Qty: 360 TABLET | Refills: 0 | Status: SHIPPED | OUTPATIENT
Start: 2025-09-02

## 2025-09-02 RX ORDER — DESVENLAFAXINE 50 MG/1
50 TABLET, EXTENDED RELEASE ORAL DAILY
Qty: 90 TABLET | Refills: 0 | Status: SHIPPED | OUTPATIENT
Start: 2025-09-02

## 2025-09-03 ASSESSMENT — ENCOUNTER SYMPTOMS
COLOR CHANGE: 0
HEMATURIA: 0
WOUND: 0
SHORTNESS OF BREATH: 0
PALPITATIONS: 0
PHOTOPHOBIA: 0
HALLUCINATIONS: 0
DECREASED CONCENTRATION: 1
DYSURIA: 0
ABDOMINAL PAIN: 0
NAUSEA: 0
VOMITING: 0
NERVOUS/ANXIOUS: 1
WHEEZING: 0
FEVER: 0
CHILLS: 0
COUGH: 0
EYE REDNESS: 0
DIARRHEA: 0

## (undated) DEVICE — ANTIFOG SOLUTION SEE SHARP 150M TROCAR SWABS 30978

## (undated) DEVICE — SU STRATAFIX PDS PLUS 0 CT-2 9" SXPP1A446

## (undated) DEVICE — STOCKING SLEEVE COMPRESSION CALF MED

## (undated) DEVICE — SU VICRYL 2-0 CT-2 27" J333H

## (undated) DEVICE — PREP CHLORAPREP 26ML TINTED ORANGE  260815

## (undated) DEVICE — DAVINCI XI OBTURATOR BLADELESS 8MM 470359

## (undated) DEVICE — DEVICE SUTURE PASSER 14GA WECK EFX EFXSP2

## (undated) DEVICE — RULER SURGICAL PLASTIC STRL LF CS628

## (undated) DEVICE — GLOVE BIOGEL PI MICRO SZ 5.5 48555

## (undated) DEVICE — DAVINCI XI SEAL UNIVERSAL 5-12MM 470500

## (undated) DEVICE — DAVINCI XI DRIVER NDL MEGA SUTURECUT 8MM EXT 471309

## (undated) DEVICE — DRSG TEGADERM 2 3/8X2 3/4" 1624W

## (undated) DEVICE — GOWN LG DISP 9515

## (undated) DEVICE — SUCTION MANIFOLD NEPTUNE 2 SYS 4 PORT 0702-020-000

## (undated) DEVICE — DAVINCI XI MONOPOLAR SCISSORS HOT SHEARS 8MM 470179

## (undated) DEVICE — ENDO TROCAR FIRST ENTRY KII FIOS Z-THRD 05X100MM CTF03

## (undated) DEVICE — GLOVE BIOGEL PI MICRO INDICATOR UNDERGLOVE SZ 6.0 48960

## (undated) DEVICE — SYR 30ML SLIP TIP W/O NDL 302833

## (undated) DEVICE — SU VICRYL 3-0 SH 27" J316H

## (undated) DEVICE — SU STRATAFIX PDS PLUS 2-0 SPIRAL SH 23CM SXPP1B433

## (undated) DEVICE — GOWN XLG DISP 9545

## (undated) DEVICE — ENDO TROCAR FIRST ENTRY KII FIOS ADV FIX 12X100MM CFF73

## (undated) DEVICE — SU MONOCRYL 4-0 PS-2 18" UND Y496G

## (undated) DEVICE — Device

## (undated) DEVICE — DRAPE U SPLIT 74X120" 29440

## (undated) DEVICE — DRSG GAUZE 4X4" 3033

## (undated) DEVICE — SU STRATAFIX PDS PLUS 2-0 SPIRAL SH 30CM SXPP1B416

## (undated) DEVICE — DRAPE TIBURON GENERAL ENDOSCOPY 9458

## (undated) DEVICE — DAVINCI XI DRAPE ARM 470015

## (undated) DEVICE — SU DERMABOND ADVANCED .7ML DNX12

## (undated) DEVICE — ENDO POUCH UNIVERSAL RETRIEVAL SYSTEM INZII 12/15MM CD004

## (undated) DEVICE — KIT PATIENT POSITIONING PIGAZZI LATEX FREE 40580

## (undated) DEVICE — DAVINCI XI DRAPE COLUMN 470341

## (undated) DEVICE — FILTER LAPROSHIELD SMOKE EVAC LSF1

## (undated) DEVICE — BNDG ABDOMINAL BINDER 9X45-62" 79-89071

## (undated) DEVICE — DAVINCI XI FCP CADIERE 8MM ENDOWRIST 471049

## (undated) RX ORDER — CELECOXIB 200 MG/1
CAPSULE ORAL
Status: DISPENSED
Start: 2024-09-26

## (undated) RX ORDER — CEFAZOLIN SODIUM/WATER 2 G/20 ML
SYRINGE (ML) INTRAVENOUS
Status: DISPENSED
Start: 2024-09-26

## (undated) RX ORDER — HYDROMORPHONE HCL IN WATER/PF 6 MG/30 ML
PATIENT CONTROLLED ANALGESIA SYRINGE INTRAVENOUS
Status: DISPENSED
Start: 2024-09-26

## (undated) RX ORDER — LIDOCAINE HYDROCHLORIDE 10 MG/ML
INJECTION, SOLUTION EPIDURAL; INFILTRATION; INTRACAUDAL; PERINEURAL
Status: DISPENSED
Start: 2024-09-26

## (undated) RX ORDER — GABAPENTIN 600 MG/1
TABLET ORAL
Status: DISPENSED
Start: 2024-09-26

## (undated) RX ORDER — KETOROLAC TROMETHAMINE 30 MG/ML
INJECTION, SOLUTION INTRAMUSCULAR; INTRAVENOUS
Status: DISPENSED
Start: 2024-09-26

## (undated) RX ORDER — GLYCOPYRROLATE 0.2 MG/ML
INJECTION, SOLUTION INTRAMUSCULAR; INTRAVENOUS
Status: DISPENSED
Start: 2024-09-26

## (undated) RX ORDER — FENTANYL CITRATE 50 UG/ML
INJECTION, SOLUTION INTRAMUSCULAR; INTRAVENOUS
Status: DISPENSED
Start: 2024-09-26

## (undated) RX ORDER — HYDROXYZINE HYDROCHLORIDE 25 MG/1
TABLET, FILM COATED ORAL
Status: DISPENSED
Start: 2024-09-26

## (undated) RX ORDER — BUPIVACAINE HYDROCHLORIDE AND EPINEPHRINE 2.5; 5 MG/ML; UG/ML
INJECTION, SOLUTION EPIDURAL; INFILTRATION; INTRACAUDAL; PERINEURAL
Status: DISPENSED
Start: 2024-09-26

## (undated) RX ORDER — MEPERIDINE HYDROCHLORIDE 25 MG/ML
INJECTION INTRAMUSCULAR; INTRAVENOUS; SUBCUTANEOUS
Status: DISPENSED
Start: 2024-09-26

## (undated) RX ORDER — PROPOFOL 10 MG/ML
INJECTION, EMULSION INTRAVENOUS
Status: DISPENSED
Start: 2024-09-26

## (undated) RX ORDER — HEPARIN SODIUM 5000 [USP'U]/.5ML
INJECTION, SOLUTION INTRAVENOUS; SUBCUTANEOUS
Status: DISPENSED
Start: 2024-09-26

## (undated) RX ORDER — OXYCODONE HYDROCHLORIDE 5 MG/1
TABLET ORAL
Status: DISPENSED
Start: 2024-09-26